# Patient Record
Sex: MALE | Race: WHITE | NOT HISPANIC OR LATINO | Employment: OTHER | URBAN - METROPOLITAN AREA
[De-identification: names, ages, dates, MRNs, and addresses within clinical notes are randomized per-mention and may not be internally consistent; named-entity substitution may affect disease eponyms.]

---

## 2017-06-08 ENCOUNTER — OFFICE VISIT (OUTPATIENT)
Dept: MEDICAL GROUP | Facility: MEDICAL CENTER | Age: 62
End: 2017-06-08
Attending: INTERNAL MEDICINE
Payer: MEDICAID

## 2017-06-08 VITALS
HEART RATE: 88 BPM | DIASTOLIC BLOOD PRESSURE: 90 MMHG | OXYGEN SATURATION: 93 % | RESPIRATION RATE: 16 BRPM | HEIGHT: 70 IN | TEMPERATURE: 98.6 F | WEIGHT: 143 LBS | SYSTOLIC BLOOD PRESSURE: 130 MMHG | BODY MASS INDEX: 20.47 KG/M2

## 2017-06-08 DIAGNOSIS — L57.0 ACTINIC KERATOSIS: ICD-10-CM

## 2017-06-08 DIAGNOSIS — Z13.1 SCREENING FOR DIABETES MELLITUS: ICD-10-CM

## 2017-06-08 DIAGNOSIS — R41.3 MEMORY LOSS: ICD-10-CM

## 2017-06-08 DIAGNOSIS — Z13.220 SCREENING CHOLESTEROL LEVEL: ICD-10-CM

## 2017-06-08 DIAGNOSIS — F10.21 ALCOHOLISM IN REMISSION (HCC): ICD-10-CM

## 2017-06-08 DIAGNOSIS — Z72.0 TOBACCO USE: ICD-10-CM

## 2017-06-08 PROCEDURE — 99204 OFFICE O/P NEW MOD 45 MIN: CPT | Performed by: INTERNAL MEDICINE

## 2017-06-08 ASSESSMENT — PATIENT HEALTH QUESTIONNAIRE - PHQ9: CLINICAL INTERPRETATION OF PHQ2 SCORE: 0

## 2017-06-08 ASSESSMENT — PAIN SCALES - GENERAL: PAINLEVEL: NO PAIN

## 2017-06-08 NOTE — PROGRESS NOTES
Bola Varela is a 61 y.o. male here for evaluation of memory loss, establish care    HPI:  No previous PCP, cannot remember the last time he saw a physician  Memory loss  Patient denies memory loss, however he was sent over for evaluation by [facility] for this reason. He had a little bit of difficulty getting down the routine expected of him at [facility] and thinks this may be why he was sent over. He is been there for about 5 weeks now and feels comfortable with things.    Alcoholism in remission (CMS-HCC)  Patient reports a history of heavy drinking when he was on the streets although he cannot quantify the amount. States that he was picked up for too many open container violations and not swine he is currently residing at [facility]. Has been there for 5 weeks, does not know how long he will be required to stay. Has maintained sobriety throughout that time.    Tobacco use  Patient reports smoking several cigarettes a day. He has been smoking for most of his distal life off and on. He denies dyspnea on exertion, states he can walk up a flight of stairs or walk a block without having to stop. He denies chronic cough.    Actinic keratosis  Numerous actinic keratoses over forearms and hands. Patient has had a lot of sun exposure.      Current medicines (including changes today)  No current outpatient prescriptions on file.     No current facility-administered medications for this visit.     He  has a past medical history of Tobacco use.  He  has past surgical history that includes ankle orif (Right).  Social History   Substance Use Topics   • Smoking status: Current Every Day Smoker -- 0.10 packs/day for 40 years     Types: Cigarettes   • Smokeless tobacco: Never Used      Comment: 1 ppd until few years ago   • Alcohol Use: No      Comment: previous heavy alcohol use     Social History     Social History Narrative     Family History   Problem Relation Age of Onset   • No Known Problems Mother    • No Known  "Problems Father    • Cancer Neg Hx    • Diabetes Neg Hx    • Heart Disease Neg Hx    • Stroke Neg Hx        ROS  As above in history of present illness  All other systems reviewed and are negative     Objective:     Blood pressure 130/90, pulse 88, temperature 37 °C (98.6 °F), resp. rate 16, height 1.778 m (5' 10\"), weight 64.864 kg (143 lb), SpO2 93 %. Body mass index is 20.52 kg/(m^2).  Physical Exam:    Constitutional: Alert, no distress.  Skin: Warm, dry, good turgor, severely sun damaged skin, numerous actinic keratoses .  Eye: Equal, round and reactive, conjunctiva clear, lids normal.  ENMT: Lips without lesions, poor dentition, oropharynx clear.  Neck: Trachea midline, no masses, no thyromegaly. No cervical or supraclavicular lymphadenopathy.  Respiratory: Unlabored respiratory effort, diffuse wheezing on over right upper and lower lung fields, left side clear to ascultation  Cardiovascular: Normal S1, S2, no murmur, no edema.  Abdomen: Soft, non-tender, no masses, no hepatosplenomegaly.  Psych: Alert and oriented x3, normal affect and mood. MMSE done in clinic today was 30/30    Assessment and Plan:   The following treatment plan was discussed    1. Memory loss  Patient has difficulty recalling specific details about past habits and timeframes, however his score on the MMSE done in clinic today was 30 out of 30. Some of his memory issues may be related to his history of heavy drinking. He states he will be moving to [place] before the end of this month for work. In this case, no further workup warranted.    2. Alcoholism in remission (CMS-HCC)  Stable, currently living in [facility]. Will continue to monitor.  - COMP METABOLIC PANEL; Future    3. Screening for diabetes mellitus  - HEMOGLOBIN A1C; Future    4. Screening cholesterol level  - LIPID PROFILE; Future    5. Tobacco use  Patient smokes one to 2 cigarettes a day. Not interested in quitting at this time. We will continue to encourage cessation.    6. " Wheezing on exam  Patient is asymptomatic from a pulmonary standpoint. However given his long smoking history, I would like to get a chest x-ray to evaluate his abnormal breath sounds. However he quickly left the clinic due to an appointment with his  and plans on leaving [place] in the next couple of weeks, so I did not order this. If he returns for follow-up, would obtain a chest x-ray.    7. Actinic keratoses  Numerous actinic keratoses over bilateral forearms. We discussed having these frozen off today however patient had to leave the clinic to make his  his appointment. If he returns for follow-up, we can treat these with cryotherapy.      Followup: Return in about 1 year (around 6/8/2018), or if symptoms worsen or fail to improve.

## 2017-06-08 NOTE — MR AVS SNAPSHOT
"        Bola Varela   2017 10:50 AM   Office Visit   MRN: [id number]    [facility]   Dept Phone:  [phone]    Description:  Male : 1955   Provider:  Bharti Ramirez M.D.           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)   [576185]       Screening for diabetes mellitus   [V77.1.ICD-9-CM]       Screening cholesterol level   [006917]         Vital Signs     Blood Pressure Pulse Temperature Respirations Height Weight    130/90 mmHg 88 37 °C (98.6 °F) 16 1.778 m (5' 10\") 64.864 kg (143 lb)    Body Mass Index Oxygen Saturation Smoking Status             20.52 kg/m2 93% Current Every Day Smoker         Basic Information     Date Of Birth Sex Race Ethnicity Preferred Language    1955 Male White Non- English      Problem List              ICD-10-CM Priority Class Noted - Resolved    Memory loss R41.3   2017 - Present    Alcoholism in remission (CMS-HCC) F10.21   2017 - Present      Health Maintenance        Date Due Completion Dates    IMM DTaP/Tdap/Td Vaccine (1 - Tdap) 1973 ---    COLONOSCOPY 2004 ---    IMM ZOSTER VACCINE 2014 ---            Current Immunizations     No immunizations on file.      Below and/or attached are the medications your provider expects you to take. Review all of your home medications and newly ordered medications with your provider and/or pharmacist. Follow medication instructions as directed by your provider and/or pharmacist. Please keep your medication list with you and share with your provider. Update the information when medications are discontinued, doses are changed, or new medications (including over-the-counter products) are added; and carry medication information at all times in the event of emergency situations     Allergies:  No Known Allergies          Medications  Valid as of: 2017 - 11:15 " AM    Generic Name Brand Name Tablet Size Instructions for use    .                 Medicines prescribed today were sent to:     [facility] - [place], [place] - [address]    [address] [place] [address]    Phone: [phone] Fax: [phone]    Open 24 Hours?: No      Medication refill instructions:       If your prescription bottle indicates you have medication refills left, it is not necessary to call your provider’s office. Please contact your pharmacy and they will refill your medication.    If your prescription bottle indicates you do not have any refills left, you may request refills at any time through one of the following ways: The online [employer] system (except Urgent Care), by calling your provider’s office, or by asking your pharmacy to contact your provider’s office with a refill request. Medication refills are processed only during regular business hours and may not be available until the next business day. Your provider may request additional information or to have a follow-up visit with you prior to refilling your medication.   *Please Note: Medication refills are assigned a new Rx number when refilled electronically. Your pharmacy may indicate that no refills were authorized even though a new prescription for the same medication is available at the pharmacy. Please request the medicine by name with the pharmacy before contacting your provider for a refill.        Your To Do List     Future Labs/Procedures Complete By Expires    COMP METABOLIC PANEL  As directed 6/9/2018    HEMOGLOBIN A1C  As directed 6/9/2018    LIPID PROFILE  As directed 6/9/2018         [employer] Status: Patient Declined        Quit Tobacco Information     Do you want to quit using tobacco?    Quitting tobacco decreases risks of cancer, heart and lung disease, increases life expectancy, improves sense of taste and smell, and increases spending money, among other benefits.    If you are thinking about quitting, we can  help.  • [facility] Quit Tobacco Program: 633-652-7083  o Program occurs weekly for four weeks and includes pharmacist consultation on products to support quitting smoking or chewing tobacco. A provider referral is needed for pharmacist consultation.  • Tobacco Users Help Hotline: 2-075-QUIT-NOW (090-7347) or https://[place].quitlogix.org/  o Free, confidential telephone and online coaching for [place] residents. Sessions are designed on a schedule that is convenient for you. Eligible clients receive free nicotine replacement therapy.  • Nationally: www.smokefree.gov  o Information and professional assistance to support both immediate and long-term needs as you become, and remain, a non-smoker. Smokefree.gov allows you to choose the help that best fits your needs.

## 2017-06-08 NOTE — ASSESSMENT & PLAN NOTE
Patient reports smoking several cigarettes a day. He has been smoking for most of his distal life off and on. He denies dyspnea on exertion, states he can walk up a flight of stairs or walk a block without having to stop. He denies chronic cough.

## 2017-06-08 NOTE — ASSESSMENT & PLAN NOTE
Patient denies memory loss, however he was sent over for evaluation by [facility] for this reason. He had a little bit of difficulty getting down the routine expected of him at [facility] and thinks this may be why he was sent over. He is been there for about 5 weeks now and feels comfortable with things.

## 2017-06-08 NOTE — ASSESSMENT & PLAN NOTE
Patient reports a history of heavy drinking when he was on the streets although he cannot quantify the amount. States that he was picked up for too many open container violations and not swine he is currently [facility]. Has been there for 5 weeks, does not know how long he will be required to stay. Has maintained sobriety throughout that time.

## 2017-06-13 ENCOUNTER — HOSPITAL ENCOUNTER (OUTPATIENT)
Dept: LAB | Facility: MEDICAL CENTER | Age: 62
End: 2017-06-13
Attending: INTERNAL MEDICINE
Payer: MEDICAID

## 2017-06-13 DIAGNOSIS — F10.21 ALCOHOLISM IN REMISSION (HCC): ICD-10-CM

## 2017-06-13 DIAGNOSIS — Z13.220 SCREENING CHOLESTEROL LEVEL: ICD-10-CM

## 2017-06-13 DIAGNOSIS — Z13.1 SCREENING FOR DIABETES MELLITUS: ICD-10-CM

## 2017-06-13 LAB
ALBUMIN SERPL BCP-MCNC: 4.4 G/DL (ref 3.2–4.9)
ALBUMIN/GLOB SERPL: 1.5 G/DL
ALP SERPL-CCNC: 55 U/L (ref 30–99)
ALT SERPL-CCNC: 31 U/L (ref 2–50)
ANION GAP SERPL CALC-SCNC: 8 MMOL/L (ref 0–11.9)
AST SERPL-CCNC: 34 U/L (ref 12–45)
BILIRUB SERPL-MCNC: 0.3 MG/DL (ref 0.1–1.5)
BUN SERPL-MCNC: 16 MG/DL (ref 8–22)
CALCIUM SERPL-MCNC: 9.5 MG/DL (ref 8.5–10.5)
CHLORIDE SERPL-SCNC: 108 MMOL/L (ref 96–112)
CHOLEST SERPL-MCNC: 173 MG/DL (ref 100–199)
CO2 SERPL-SCNC: 29 MMOL/L (ref 20–33)
CREAT SERPL-MCNC: 0.71 MG/DL (ref 0.5–1.4)
EST. AVERAGE GLUCOSE BLD GHB EST-MCNC: 117 MG/DL
GFR SERPL CREATININE-BSD FRML MDRD: >60 ML/MIN/1.73 M 2
GLOBULIN SER CALC-MCNC: 2.9 G/DL (ref 1.9–3.5)
GLUCOSE SERPL-MCNC: 95 MG/DL (ref 65–99)
HBA1C MFR BLD: 5.7 % (ref 0–5.6)
HDLC SERPL-MCNC: 58 MG/DL
LDLC SERPL CALC-MCNC: 102 MG/DL
POTASSIUM SERPL-SCNC: 3.8 MMOL/L (ref 3.6–5.5)
PROT SERPL-MCNC: 7.3 G/DL (ref 6–8.2)
SODIUM SERPL-SCNC: 145 MMOL/L (ref 135–145)
TRIGL SERPL-MCNC: 65 MG/DL (ref 0–149)

## 2017-06-13 PROCEDURE — 83036 HEMOGLOBIN GLYCOSYLATED A1C: CPT

## 2017-06-13 PROCEDURE — 80053 COMPREHEN METABOLIC PANEL: CPT

## 2017-06-13 PROCEDURE — 80061 LIPID PANEL: CPT

## 2017-06-13 PROCEDURE — 36415 COLL VENOUS BLD VENIPUNCTURE: CPT

## 2017-06-14 ENCOUNTER — TELEPHONE (OUTPATIENT)
Dept: MEDICAL GROUP | Facility: MEDICAL CENTER | Age: 62
End: 2017-06-14

## 2017-06-14 PROBLEM — R73.03 PRE-DIABETES: Status: ACTIVE | Noted: 2017-06-14

## 2017-06-14 NOTE — TELEPHONE ENCOUNTER
Phone Number Called: [phone] (home) [phone] (work)    Message: Left message on answering service for Pt to return call     Left Message for patient to call back: yes

## 2017-06-14 NOTE — TELEPHONE ENCOUNTER
----- Message from Bharti Ramirez M.D. sent at 6/14/2017  8:31 AM PDT -----  Please inform patient we have received his lab results which show normal kidney and liver function.  His screening test for diabetes is slightly elevated, which puts him in the pre diabetes category.  He does not have diabetes currently, but is at increased risk for developing it in the future.  His bad cholesterol is very slightly elevated.  There are no new medications we need to start at this point.    Bharti Ramirez M.D.

## 2017-06-15 ENCOUNTER — TELEPHONE (OUTPATIENT)
Dept: MEDICAL GROUP | Facility: MEDICAL CENTER | Age: 62
End: 2017-06-15

## 2017-06-15 NOTE — TELEPHONE ENCOUNTER
Phone Number Called: [phone] (home) [phone] (work)    Message: Spoke with patients  and advised her of the message.  stated that [facility] does not have a working phone and that they are working with him.    Left Message for patient to call back: yes

## 2017-09-22 ENCOUNTER — HOSPITAL ENCOUNTER (EMERGENCY)
Dept: HOSPITAL 8 - ED | Age: 62
Discharge: HOME | End: 2017-09-22
Payer: COMMERCIAL

## 2017-09-22 VITALS — WEIGHT: 149.91 LBS | HEIGHT: 71 IN | BODY MASS INDEX: 20.99 KG/M2

## 2017-09-22 VITALS — SYSTOLIC BLOOD PRESSURE: 148 MMHG | DIASTOLIC BLOOD PRESSURE: 100 MMHG

## 2017-09-22 DIAGNOSIS — S51.011A: Primary | ICD-10-CM
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
## 2017-09-22 PROCEDURE — 90715 TDAP VACCINE 7 YRS/> IM: CPT

## 2017-09-22 PROCEDURE — 99284 EMERGENCY DEPT VISIT MOD MDM: CPT

## 2017-09-22 PROCEDURE — 73080 X-RAY EXAM OF ELBOW: CPT

## 2017-09-22 PROCEDURE — 12002 RPR S/N/AX/GEN/TRNK2.6-7.5CM: CPT

## 2017-09-22 PROCEDURE — 90471 IMMUNIZATION ADMIN: CPT

## 2017-10-22 ENCOUNTER — HOSPITAL ENCOUNTER (EMERGENCY)
Dept: HOSPITAL 8 - ED | Age: 62
LOS: 1 days | Discharge: HOME | End: 2017-10-23
Payer: MEDICAID

## 2017-10-22 DIAGNOSIS — Z59.0: ICD-10-CM
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
## 2017-10-22 DIAGNOSIS — S50.811A: ICD-10-CM

## 2017-10-22 DIAGNOSIS — F10.129: Primary | ICD-10-CM
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
## 2017-10-22 DIAGNOSIS — F19.10: ICD-10-CM
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
## 2017-10-22 PROCEDURE — 99283 EMERGENCY DEPT VISIT LOW MDM: CPT

## 2017-10-22 SDOH — ECONOMIC STABILITY - HOUSING INSECURITY: HOMELESSNESS: Z59.0

## 2017-10-23 VITALS — SYSTOLIC BLOOD PRESSURE: 122 MMHG | DIASTOLIC BLOOD PRESSURE: 66 MMHG

## 2018-05-01 ENCOUNTER — HOSPITAL ENCOUNTER (INPATIENT)
Dept: HOSPITAL 8 - ED | Age: 63
LOS: 8 days | Discharge: NURSING, REHAB OR LONG-TERM CARE | DRG: 853 | End: 2018-05-09
Attending: HOSPITALIST | Admitting: HOSPITALIST
Payer: MEDICAID

## 2018-05-01 VITALS — WEIGHT: 154.76 LBS | HEIGHT: 71 IN | BODY MASS INDEX: 21.67 KG/M2

## 2018-05-01 VITALS — DIASTOLIC BLOOD PRESSURE: 76 MMHG | SYSTOLIC BLOOD PRESSURE: 116 MMHG

## 2018-05-01 VITALS — SYSTOLIC BLOOD PRESSURE: 114 MMHG | DIASTOLIC BLOOD PRESSURE: 68 MMHG

## 2018-05-01 DIAGNOSIS — E46: ICD-10-CM

## 2018-05-01 DIAGNOSIS — E87.1: ICD-10-CM

## 2018-05-01 DIAGNOSIS — D50.0: ICD-10-CM

## 2018-05-01 DIAGNOSIS — N17.9: ICD-10-CM

## 2018-05-01 DIAGNOSIS — A41.9: Primary | ICD-10-CM

## 2018-05-01 DIAGNOSIS — E87.6: ICD-10-CM

## 2018-05-01 DIAGNOSIS — J93.82: ICD-10-CM

## 2018-05-01 DIAGNOSIS — B85.1: ICD-10-CM

## 2018-05-01 DIAGNOSIS — L03.116: ICD-10-CM

## 2018-05-01 DIAGNOSIS — K76.0: ICD-10-CM

## 2018-05-01 DIAGNOSIS — B95.5: ICD-10-CM

## 2018-05-01 DIAGNOSIS — F41.9: ICD-10-CM

## 2018-05-01 DIAGNOSIS — F05: ICD-10-CM

## 2018-05-01 DIAGNOSIS — K05.6: ICD-10-CM

## 2018-05-01 DIAGNOSIS — D53.9: ICD-10-CM

## 2018-05-01 DIAGNOSIS — L29.9: ICD-10-CM

## 2018-05-01 DIAGNOSIS — R73.9: ICD-10-CM

## 2018-05-01 DIAGNOSIS — F10.239: ICD-10-CM

## 2018-05-01 DIAGNOSIS — B86: ICD-10-CM

## 2018-05-01 DIAGNOSIS — M60.9: ICD-10-CM

## 2018-05-01 DIAGNOSIS — B95.8: ICD-10-CM

## 2018-05-01 DIAGNOSIS — Z59.0: ICD-10-CM

## 2018-05-01 DIAGNOSIS — M72.6: ICD-10-CM

## 2018-05-01 DIAGNOSIS — G93.41: ICD-10-CM

## 2018-05-01 DIAGNOSIS — F17.210: ICD-10-CM

## 2018-05-01 LAB
<PLATELET ESTIMATE>: ADEQUATE
<PLT MORPHOLOGY>: (no result)
ALBUMIN SERPL-MCNC: 2.7 G/DL (ref 3.4–5)
ALP SERPL-CCNC: 111 U/L (ref 45–117)
ALT SERPL-CCNC: 113 U/L (ref 12–78)
ANION GAP SERPL CALC-SCNC: 13 MMOL/L (ref 5–15)
BAND#(MANUAL): 4.9 X10^3/UL
BILIRUB DIRECT SERPL-MCNC: NORMAL MG/DL
BILIRUB SERPL-MCNC: 1.1 MG/DL (ref 0.2–1)
CALCIUM SERPL-MCNC: 8.1 MG/DL (ref 8.5–10.1)
CHLORIDE SERPL-SCNC: 97 MMOL/L (ref 98–107)
CREAT SERPL-MCNC: 1.44 MG/DL (ref 0.7–1.3)
ERYTHROCYTE [DISTWIDTH] IN BLOOD BY AUTOMATED COUNT: 15 % (ref 9.4–14.8)
ERYTHROCYTE [SEDIMENTATION RATE] IN BLOOD BY WESTERGREN METHOD: 76 MM/HR (ref 0–10)
HCT (SEDRATE): 37.5 % (ref 39.2–51.8)
INR PPP: 1.11 (ref 0.93–1.1)
LYMPH#(MANUAL): 0.25 X10^3/UL (ref 1–3.4)
LYMPHS% (MANUAL): 1 % (ref 22–44)
MCH RBC QN AUTO: 32.4 PG (ref 27.5–34.5)
MCHC RBC AUTO-ENTMCNC: 33.3 G/DL (ref 33.2–36.2)
MCV RBC AUTO: 97.6 FL (ref 81–97)
MD: YES
MONOS#(MANUAL): 1.47 X10^3/UL (ref 0.3–2.7)
MONOS% (MANUAL): 6 % (ref 2–9)
NEUTS BAND NFR BLD: 20 % (ref 0–7)
PLATELET # BLD AUTO: 224 X10^3/UL (ref 130–400)
PMNS WITH VACUOLES: (no result)
PMV BLD AUTO: 7.8 FL (ref 7.4–10.4)
PROT SERPL-MCNC: 6.8 G/DL (ref 6.4–8.2)
PROTHROMBIN TIME: 11.5 SECONDS (ref 9.6–11.5)
RBC # BLD AUTO: 3.63 X10^6/UL (ref 4.38–5.82)
SEG#(MANUAL): 17.89 X10^3/UL (ref 1.8–6.8)
SEGS% (MANUAL): 73 % (ref 42–75)

## 2018-05-01 PROCEDURE — 77001 FLUOROGUIDE FOR VEIN DEVICE: CPT

## 2018-05-01 PROCEDURE — 82746 ASSAY OF FOLIC ACID SERUM: CPT

## 2018-05-01 PROCEDURE — 86704 HEP B CORE ANTIBODY TOTAL: CPT

## 2018-05-01 PROCEDURE — 82607 VITAMIN B-12: CPT

## 2018-05-01 PROCEDURE — 87040 BLOOD CULTURE FOR BACTERIA: CPT

## 2018-05-01 PROCEDURE — 76937 US GUIDE VASCULAR ACCESS: CPT

## 2018-05-01 PROCEDURE — 87147 CULTURE TYPE IMMUNOLOGIC: CPT

## 2018-05-01 PROCEDURE — 87077 CULTURE AEROBIC IDENTIFY: CPT

## 2018-05-01 PROCEDURE — 87075 CULTR BACTERIA EXCEPT BLOOD: CPT

## 2018-05-01 PROCEDURE — 85025 COMPLETE CBC W/AUTO DIFF WBC: CPT

## 2018-05-01 PROCEDURE — 93005 ELECTROCARDIOGRAM TRACING: CPT

## 2018-05-01 PROCEDURE — 85651 RBC SED RATE NONAUTOMATED: CPT

## 2018-05-01 PROCEDURE — 86803 HEPATITIS C AB TEST: CPT

## 2018-05-01 PROCEDURE — 87340 HEPATITIS B SURFACE AG IA: CPT

## 2018-05-01 PROCEDURE — 86706 HEP B SURFACE ANTIBODY: CPT

## 2018-05-01 PROCEDURE — 87205 SMEAR GRAM STAIN: CPT

## 2018-05-01 PROCEDURE — G0475 HIV COMBINATION ASSAY: HCPCS

## 2018-05-01 PROCEDURE — 87176 TISSUE HOMOGENIZATION CULTR: CPT

## 2018-05-01 PROCEDURE — 99285 EMERGENCY DEPT VISIT HI MDM: CPT

## 2018-05-01 PROCEDURE — 84100 ASSAY OF PHOSPHORUS: CPT

## 2018-05-01 PROCEDURE — 71045 X-RAY EXAM CHEST 1 VIEW: CPT

## 2018-05-01 PROCEDURE — 76700 US EXAM ABDOM COMPLETE: CPT

## 2018-05-01 PROCEDURE — C1751 CATH, INF, PER/CENT/MIDLINE: HCPCS

## 2018-05-01 PROCEDURE — 86480 TB TEST CELL IMMUN MEASURE: CPT

## 2018-05-01 PROCEDURE — 82550 ASSAY OF CK (CPK): CPT

## 2018-05-01 PROCEDURE — 87070 CULTURE OTHR SPECIMN AEROBIC: CPT

## 2018-05-01 PROCEDURE — 87806 HIV AG W/HIV1&2 ANTB W/OPTIC: CPT

## 2018-05-01 PROCEDURE — 0JBP0ZZ EXCISION OF LEFT LOWER LEG SUBCUTANEOUS TISSUE AND FASCIA, OPEN APPROACH: ICD-10-PCS | Performed by: SURGERY

## 2018-05-01 PROCEDURE — 87181 SC STD AGAR DILUTION PER AGT: CPT

## 2018-05-01 PROCEDURE — 84145 PROCALCITONIN (PCT): CPT

## 2018-05-01 PROCEDURE — 36415 COLL VENOUS BLD VENIPUNCTURE: CPT

## 2018-05-01 PROCEDURE — 83735 ASSAY OF MAGNESIUM: CPT

## 2018-05-01 PROCEDURE — 80053 COMPREHEN METABOLIC PANEL: CPT

## 2018-05-01 PROCEDURE — 86140 C-REACTIVE PROTEIN: CPT

## 2018-05-01 PROCEDURE — 85610 PROTHROMBIN TIME: CPT

## 2018-05-01 PROCEDURE — 87186 SC STD MICRODIL/AGAR DIL: CPT

## 2018-05-01 PROCEDURE — 84443 ASSAY THYROID STIM HORMONE: CPT

## 2018-05-01 PROCEDURE — 83605 ASSAY OF LACTIC ACID: CPT

## 2018-05-01 PROCEDURE — 36569 INSJ PICC 5 YR+ W/O IMAGING: CPT

## 2018-05-01 RX ADMIN — DAPTOMYCIN SCH MLS/HR: 500 INJECTION, POWDER, LYOPHILIZED, FOR SOLUTION INTRAVENOUS at 22:57

## 2018-05-01 RX ADMIN — CLINDAMYCIN IN 5 PERCENT DEXTROSE SCH MLS/HR: 12 INJECTION, SOLUTION INTRAVENOUS at 15:16

## 2018-05-01 RX ADMIN — TAZOBACTAM SODIUM AND PIPERACILLIN SODIUM SCH MLS/HR: 500; 4 INJECTION, SOLUTION INTRAVENOUS at 19:36

## 2018-05-01 RX ADMIN — KETOROLAC TROMETHAMINE SCH MG: 30 INJECTION, SOLUTION INTRAMUSCULAR at 22:09

## 2018-05-01 RX ADMIN — HEPARIN SODIUM SCH UNITS: 5000 INJECTION, SOLUTION INTRAVENOUS; SUBCUTANEOUS at 22:57

## 2018-05-01 RX ADMIN — CLINDAMYCIN IN 5 PERCENT DEXTROSE SCH MLS/HR: 12 INJECTION, SOLUTION INTRAVENOUS at 21:30

## 2018-05-01 RX ADMIN — HEPARIN SODIUM SCH UNITS: 5000 INJECTION, SOLUTION INTRAVENOUS; SUBCUTANEOUS at 14:37

## 2018-05-01 SDOH — ECONOMIC STABILITY - HOUSING INSECURITY: HOMELESSNESS: Z59.0

## 2018-05-02 VITALS — SYSTOLIC BLOOD PRESSURE: 115 MMHG | DIASTOLIC BLOOD PRESSURE: 64 MMHG

## 2018-05-02 VITALS — DIASTOLIC BLOOD PRESSURE: 59 MMHG | SYSTOLIC BLOOD PRESSURE: 117 MMHG

## 2018-05-02 VITALS — SYSTOLIC BLOOD PRESSURE: 114 MMHG | DIASTOLIC BLOOD PRESSURE: 73 MMHG

## 2018-05-02 VITALS — DIASTOLIC BLOOD PRESSURE: 66 MMHG | SYSTOLIC BLOOD PRESSURE: 100 MMHG

## 2018-05-02 LAB
<PLATELET ESTIMATE>: ADEQUATE
<PLT MORPHOLOGY>: (no result)
ALBUMIN SERPL-MCNC: 1.9 G/DL (ref 3.4–5)
ALP SERPL-CCNC: 82 U/L (ref 45–117)
ALT SERPL-CCNC: 113 U/L (ref 12–78)
ANION GAP SERPL CALC-SCNC: 11 MMOL/L (ref 5–15)
BAND#(MANUAL): 2.24 X10^3/UL
BILIRUB DIRECT SERPL-MCNC: NORMAL MG/DL
BILIRUB SERPL-MCNC: 0.6 MG/DL (ref 0.2–1)
CALCIUM SERPL-MCNC: 6.7 MG/DL (ref 8.5–10.1)
CHLORIDE SERPL-SCNC: 104 MMOL/L (ref 98–107)
CREAT SERPL-MCNC: 1.49 MG/DL (ref 0.7–1.3)
DOHLE BOD BLD QL SMEAR: (no result)
ERYTHROCYTE [DISTWIDTH] IN BLOOD BY AUTOMATED COUNT: 15.2 % (ref 9.4–14.8)
FOLATE SERPL-MCNC: 14.2 NG/ML (ref 3.1–17.5)
LYMPH#(MANUAL): 0.52 X10^3/UL (ref 1–3.4)
LYMPHS% (MANUAL): 3 % (ref 22–44)
MCH RBC QN AUTO: 33.5 PG (ref 27.5–34.5)
MCHC RBC AUTO-ENTMCNC: 33.3 G/DL (ref 33.2–36.2)
MCV RBC AUTO: 100.5 FL (ref 81–97)
MD: YES
MONOS#(MANUAL): 0.69 X10^3/UL (ref 0.3–2.7)
MONOS% (MANUAL): 4 % (ref 2–9)
NEUTS BAND NFR BLD: 13 % (ref 0–7)
PLATELET # BLD AUTO: 184 X10^3/UL (ref 130–400)
PMNS WITH VACUOLES: (no result)
PMV BLD AUTO: 8.2 FL (ref 7.4–10.4)
PROT SERPL-MCNC: 5.4 G/DL (ref 6.4–8.2)
RBC # BLD AUTO: 2.87 X10^6/UL (ref 4.38–5.82)
SEG#(MANUAL): 13.76 X10^3/UL (ref 1.8–6.8)
SEGS% (MANUAL): 80 % (ref 42–75)
TSH SERPL-ACNC: 0.59 MIU/L (ref 0.36–3.74)

## 2018-05-02 RX ADMIN — HEPARIN SODIUM SCH UNITS: 5000 INJECTION, SOLUTION INTRAVENOUS; SUBCUTANEOUS at 08:35

## 2018-05-02 RX ADMIN — CLINDAMYCIN IN 5 PERCENT DEXTROSE SCH MLS/HR: 12 INJECTION, SOLUTION INTRAVENOUS at 21:04

## 2018-05-02 RX ADMIN — KETOROLAC TROMETHAMINE SCH MG: 30 INJECTION, SOLUTION INTRAMUSCULAR at 09:40

## 2018-05-02 RX ADMIN — TAZOBACTAM SODIUM AND PIPERACILLIN SODIUM SCH MLS/HR: 500; 4 INJECTION, SOLUTION INTRAVENOUS at 02:06

## 2018-05-02 RX ADMIN — CLINDAMYCIN IN 5 PERCENT DEXTROSE SCH MLS/HR: 12 INJECTION, SOLUTION INTRAVENOUS at 09:40

## 2018-05-02 RX ADMIN — OXYCODONE HYDROCHLORIDE PRN MG: 5 TABLET ORAL at 12:26

## 2018-05-02 RX ADMIN — TAZOBACTAM SODIUM AND PIPERACILLIN SODIUM SCH MLS/HR: 500; 4 INJECTION, SOLUTION INTRAVENOUS at 14:23

## 2018-05-02 RX ADMIN — SODIUM CHLORIDE SCH MLS/HR: 0.9 INJECTION, SOLUTION INTRAVENOUS at 21:04

## 2018-05-02 RX ADMIN — KETOROLAC TROMETHAMINE SCH MG: 30 INJECTION, SOLUTION INTRAMUSCULAR at 21:53

## 2018-05-02 RX ADMIN — CLINDAMYCIN IN 5 PERCENT DEXTROSE SCH MLS/HR: 12 INJECTION, SOLUTION INTRAVENOUS at 03:29

## 2018-05-02 RX ADMIN — HEPARIN SODIUM SCH UNITS: 5000 INJECTION, SOLUTION INTRAVENOUS; SUBCUTANEOUS at 15:53

## 2018-05-02 RX ADMIN — DAPTOMYCIN SCH MLS/HR: 500 INJECTION, POWDER, LYOPHILIZED, FOR SOLUTION INTRAVENOUS at 23:03

## 2018-05-02 RX ADMIN — KETOROLAC TROMETHAMINE SCH MG: 30 INJECTION, SOLUTION INTRAMUSCULAR at 03:29

## 2018-05-02 RX ADMIN — KETOROLAC TROMETHAMINE SCH MG: 30 INJECTION, SOLUTION INTRAMUSCULAR at 15:53

## 2018-05-02 RX ADMIN — TAZOBACTAM SODIUM AND PIPERACILLIN SODIUM SCH MLS/HR: 500; 4 INJECTION, SOLUTION INTRAVENOUS at 08:35

## 2018-05-02 RX ADMIN — SODIUM CHLORIDE SCH MLS/HR: 0.9 INJECTION, SOLUTION INTRAVENOUS at 11:01

## 2018-05-02 RX ADMIN — TAZOBACTAM SODIUM AND PIPERACILLIN SODIUM SCH MLS/HR: 500; 4 INJECTION, SOLUTION INTRAVENOUS at 19:34

## 2018-05-02 RX ADMIN — CLINDAMYCIN IN 5 PERCENT DEXTROSE SCH MLS/HR: 12 INJECTION, SOLUTION INTRAVENOUS at 15:53

## 2018-05-03 VITALS — SYSTOLIC BLOOD PRESSURE: 128 MMHG | DIASTOLIC BLOOD PRESSURE: 79 MMHG

## 2018-05-03 VITALS — DIASTOLIC BLOOD PRESSURE: 64 MMHG | SYSTOLIC BLOOD PRESSURE: 128 MMHG

## 2018-05-03 VITALS — SYSTOLIC BLOOD PRESSURE: 131 MMHG | DIASTOLIC BLOOD PRESSURE: 64 MMHG

## 2018-05-03 VITALS — DIASTOLIC BLOOD PRESSURE: 73 MMHG | SYSTOLIC BLOOD PRESSURE: 119 MMHG

## 2018-05-03 LAB
<PLATELET ESTIMATE>: ADEQUATE
<PLT MORPHOLOGY>: (no result)
ALBUMIN SERPL-MCNC: 1.8 G/DL (ref 3.4–5)
ALP SERPL-CCNC: 135 U/L (ref 45–117)
ALT SERPL-CCNC: 122 U/L (ref 12–78)
ANION GAP SERPL CALC-SCNC: 11 MMOL/L (ref 5–15)
BAND#(MANUAL): 0.27 X10^3/UL
BILIRUB DIRECT SERPL-MCNC: NORMAL MG/DL
BILIRUB SERPL-MCNC: 0.5 MG/DL (ref 0.2–1)
CALCIUM SERPL-MCNC: 7.6 MG/DL (ref 8.5–10.1)
CHLORIDE SERPL-SCNC: 108 MMOL/L (ref 98–107)
CREAT SERPL-MCNC: 1.52 MG/DL (ref 0.7–1.3)
EOS#(MANUAL): 0.54 X10^3/UL (ref 0–0.4)
EOS% (MANUAL): 4 % (ref 1–7)
ERYTHROCYTE [DISTWIDTH] IN BLOOD BY AUTOMATED COUNT: 14.5 % (ref 9.4–14.8)
LYMPH#(MANUAL): 0.68 X10^3/UL (ref 1–3.4)
LYMPHS% (MANUAL): 5 % (ref 22–44)
MCH RBC QN AUTO: 32.9 PG (ref 27.5–34.5)
MCHC RBC AUTO-ENTMCNC: 33.5 G/DL (ref 33.2–36.2)
MCV RBC AUTO: 98.2 FL (ref 81–97)
MD: YES
MONOS#(MANUAL): 0.82 X10^3/UL (ref 0.3–2.7)
MONOS% (MANUAL): 6 % (ref 2–9)
NEUTS BAND NFR BLD: 2 % (ref 0–7)
PLATELET # BLD AUTO: 204 X10^3/UL (ref 130–400)
PMV BLD AUTO: 8.3 FL (ref 7.4–10.4)
PROT SERPL-MCNC: 5.3 G/DL (ref 6.4–8.2)
RBC # BLD AUTO: 2.66 X10^6/UL (ref 4.38–5.82)
SEG#(MANUAL): 11.29 X10^3/UL (ref 1.8–6.8)
SEGS% (MANUAL): 83 % (ref 42–75)

## 2018-05-03 PROCEDURE — 0JBM0ZZ EXCISION OF LEFT UPPER LEG SUBCUTANEOUS TISSUE AND FASCIA, OPEN APPROACH: ICD-10-PCS | Performed by: SURGERY

## 2018-05-03 RX ADMIN — HEPARIN SODIUM SCH UNITS: 5000 INJECTION, SOLUTION INTRAVENOUS; SUBCUTANEOUS at 14:30

## 2018-05-03 RX ADMIN — HEPARIN SODIUM SCH UNITS: 5000 INJECTION, SOLUTION INTRAVENOUS; SUBCUTANEOUS at 08:00

## 2018-05-03 RX ADMIN — CLINDAMYCIN IN 5 PERCENT DEXTROSE SCH MLS/HR: 12 INJECTION, SOLUTION INTRAVENOUS at 03:47

## 2018-05-03 RX ADMIN — CLINDAMYCIN IN 5 PERCENT DEXTROSE SCH MLS/HR: 12 INJECTION, SOLUTION INTRAVENOUS at 15:06

## 2018-05-03 RX ADMIN — SODIUM CHLORIDE SCH MLS/HR: 0.9 INJECTION, SOLUTION INTRAVENOUS at 09:45

## 2018-05-03 RX ADMIN — CLINDAMYCIN IN 5 PERCENT DEXTROSE SCH MLS/HR: 12 INJECTION, SOLUTION INTRAVENOUS at 10:23

## 2018-05-03 RX ADMIN — TAZOBACTAM SODIUM AND PIPERACILLIN SODIUM SCH MLS/HR: 500; 4 INJECTION, SOLUTION INTRAVENOUS at 02:08

## 2018-05-03 RX ADMIN — TAZOBACTAM SODIUM AND PIPERACILLIN SODIUM SCH MLS/HR: 500; 4 INJECTION, SOLUTION INTRAVENOUS at 14:29

## 2018-05-03 RX ADMIN — CLINDAMYCIN IN 5 PERCENT DEXTROSE SCH MLS/HR: 12 INJECTION, SOLUTION INTRAVENOUS at 21:57

## 2018-05-03 RX ADMIN — DAPTOMYCIN SCH MLS/HR: 500 INJECTION, POWDER, LYOPHILIZED, FOR SOLUTION INTRAVENOUS at 23:33

## 2018-05-03 RX ADMIN — HEPARIN SODIUM SCH UNITS: 5000 INJECTION, SOLUTION INTRAVENOUS; SUBCUTANEOUS at 23:33

## 2018-05-03 RX ADMIN — KETOROLAC TROMETHAMINE SCH MG: 30 INJECTION, SOLUTION INTRAMUSCULAR at 03:47

## 2018-05-03 RX ADMIN — HEPARIN SODIUM SCH UNITS: 5000 INJECTION, SOLUTION INTRAVENOUS; SUBCUTANEOUS at 00:12

## 2018-05-03 RX ADMIN — SODIUM CHLORIDE SCH MLS/HR: 0.9 INJECTION, SOLUTION INTRAVENOUS at 14:30

## 2018-05-03 RX ADMIN — TAZOBACTAM SODIUM AND PIPERACILLIN SODIUM SCH MLS/HR: 500; 4 INJECTION, SOLUTION INTRAVENOUS at 09:44

## 2018-05-03 RX ADMIN — POTASSIUM CHLORIDE SCH MEQ: 20 TABLET, EXTENDED RELEASE ORAL at 12:14

## 2018-05-04 VITALS — SYSTOLIC BLOOD PRESSURE: 136 MMHG | DIASTOLIC BLOOD PRESSURE: 80 MMHG

## 2018-05-04 VITALS — DIASTOLIC BLOOD PRESSURE: 80 MMHG | SYSTOLIC BLOOD PRESSURE: 153 MMHG

## 2018-05-04 VITALS — DIASTOLIC BLOOD PRESSURE: 69 MMHG | SYSTOLIC BLOOD PRESSURE: 125 MMHG

## 2018-05-04 VITALS — DIASTOLIC BLOOD PRESSURE: 81 MMHG | SYSTOLIC BLOOD PRESSURE: 145 MMHG

## 2018-05-04 LAB
ALBUMIN SERPL-MCNC: 1.9 G/DL (ref 3.4–5)
ALP SERPL-CCNC: 137 U/L (ref 45–117)
ALT SERPL-CCNC: 122 U/L (ref 12–78)
ANION GAP SERPL CALC-SCNC: 9 MMOL/L (ref 5–15)
BASOPHILS # BLD AUTO: 0.04 X10^3/UL (ref 0–0.1)
BASOPHILS NFR BLD AUTO: 0 % (ref 0–1)
BILIRUB SERPL-MCNC: 0.5 MG/DL (ref 0.2–1)
CALCIUM SERPL-MCNC: 7.4 MG/DL (ref 8.5–10.1)
CHLORIDE SERPL-SCNC: 109 MMOL/L (ref 98–107)
CREAT SERPL-MCNC: 1.01 MG/DL (ref 0.7–1.3)
EOSINOPHIL # BLD AUTO: 0.3 X10^3/UL (ref 0–0.4)
EOSINOPHIL NFR BLD AUTO: 3 % (ref 1–7)
ERYTHROCYTE [DISTWIDTH] IN BLOOD BY AUTOMATED COUNT: 15.3 % (ref 9.4–14.8)
LYMPHOCYTES # BLD AUTO: 0.92 X10^3/UL (ref 1–3.4)
LYMPHOCYTES NFR BLD AUTO: 9 % (ref 22–44)
MCH RBC QN AUTO: 32.9 PG (ref 27.5–34.5)
MCHC RBC AUTO-ENTMCNC: 33.1 G/DL (ref 33.2–36.2)
MCV RBC AUTO: 99.5 FL (ref 81–97)
MD: NO
MONOCYTES # BLD AUTO: 0.75 X10^3/UL (ref 0.2–0.8)
MONOCYTES NFR BLD AUTO: 7 % (ref 2–9)
NEUTROPHILS # BLD AUTO: 8.41 X10^3/UL (ref 1.8–6.8)
NEUTROPHILS NFR BLD AUTO: 81 % (ref 42–75)
PLATELET # BLD AUTO: 268 X10^3/UL (ref 130–400)
PMV BLD AUTO: 8.3 FL (ref 7.4–10.4)
PROT SERPL-MCNC: 5.8 G/DL (ref 6.4–8.2)
RBC # BLD AUTO: 2.99 X10^6/UL (ref 4.38–5.82)

## 2018-05-04 RX ADMIN — SODIUM CHLORIDE SCH MLS/HR: 0.9 INJECTION, SOLUTION INTRAVENOUS at 07:58

## 2018-05-04 RX ADMIN — OXYCODONE HYDROCHLORIDE PRN MG: 5 TABLET ORAL at 16:38

## 2018-05-04 RX ADMIN — POTASSIUM CHLORIDE SCH MEQ: 20 TABLET, EXTENDED RELEASE ORAL at 16:35

## 2018-05-04 RX ADMIN — POTASSIUM CHLORIDE SCH MEQ: 20 TABLET, EXTENDED RELEASE ORAL at 07:56

## 2018-05-04 RX ADMIN — OXYCODONE HYDROCHLORIDE PRN MG: 5 TABLET ORAL at 05:40

## 2018-05-04 RX ADMIN — OXYCODONE HYDROCHLORIDE PRN MG: 5 TABLET ORAL at 11:06

## 2018-05-04 RX ADMIN — CLINDAMYCIN IN 5 PERCENT DEXTROSE SCH MLS/HR: 12 INJECTION, SOLUTION INTRAVENOUS at 03:41

## 2018-05-04 RX ADMIN — OXYCODONE HYDROCHLORIDE PRN MG: 5 TABLET ORAL at 22:41

## 2018-05-04 RX ADMIN — CLINDAMYCIN IN 5 PERCENT DEXTROSE SCH MLS/HR: 12 INJECTION, SOLUTION INTRAVENOUS at 10:36

## 2018-05-04 RX ADMIN — HEPARIN SODIUM SCH UNITS: 5000 INJECTION, SOLUTION INTRAVENOUS; SUBCUTANEOUS at 08:35

## 2018-05-04 RX ADMIN — HEPARIN SODIUM SCH UNITS: 5000 INJECTION, SOLUTION INTRAVENOUS; SUBCUTANEOUS at 16:35

## 2018-05-04 RX ADMIN — DAPTOMYCIN SCH MLS/HR: 500 INJECTION, POWDER, LYOPHILIZED, FOR SOLUTION INTRAVENOUS at 23:48

## 2018-05-04 RX ADMIN — SODIUM CHLORIDE SCH MLS/HR: 0.9 INJECTION, SOLUTION INTRAVENOUS at 18:35

## 2018-05-05 VITALS — DIASTOLIC BLOOD PRESSURE: 71 MMHG | SYSTOLIC BLOOD PRESSURE: 151 MMHG

## 2018-05-05 VITALS — DIASTOLIC BLOOD PRESSURE: 81 MMHG | SYSTOLIC BLOOD PRESSURE: 145 MMHG

## 2018-05-05 VITALS — SYSTOLIC BLOOD PRESSURE: 146 MMHG | DIASTOLIC BLOOD PRESSURE: 84 MMHG

## 2018-05-05 VITALS — SYSTOLIC BLOOD PRESSURE: 143 MMHG | DIASTOLIC BLOOD PRESSURE: 95 MMHG

## 2018-05-05 LAB
ALBUMIN SERPL-MCNC: 1.7 G/DL (ref 3.4–5)
ALP SERPL-CCNC: 107 U/L (ref 45–117)
ALT SERPL-CCNC: 70 U/L (ref 12–78)
ANION GAP SERPL CALC-SCNC: 10 MMOL/L (ref 5–15)
BASOPHILS # BLD AUTO: 0.03 X10^3/UL (ref 0–0.1)
BASOPHILS NFR BLD AUTO: 1 % (ref 0–1)
BILIRUB SERPL-MCNC: 0.4 MG/DL (ref 0.2–1)
CALCIUM SERPL-MCNC: 7.3 MG/DL (ref 8.5–10.1)
CHLORIDE SERPL-SCNC: 111 MMOL/L (ref 98–107)
CREAT SERPL-MCNC: 0.65 MG/DL (ref 0.7–1.3)
EOSINOPHIL # BLD AUTO: 0.08 X10^3/UL (ref 0–0.4)
EOSINOPHIL NFR BLD AUTO: 1 % (ref 1–7)
ERYTHROCYTE [DISTWIDTH] IN BLOOD BY AUTOMATED COUNT: 15.3 % (ref 9.4–14.8)
LYMPHOCYTES # BLD AUTO: 0.99 X10^3/UL (ref 1–3.4)
LYMPHOCYTES NFR BLD AUTO: 15 % (ref 22–44)
MCH RBC QN AUTO: 32.8 PG (ref 27.5–34.5)
MCHC RBC AUTO-ENTMCNC: 33 G/DL (ref 33.2–36.2)
MCV RBC AUTO: 99.3 FL (ref 81–97)
MD: NO
MONOCYTES # BLD AUTO: 0.67 X10^3/UL (ref 0.2–0.8)
MONOCYTES NFR BLD AUTO: 10 % (ref 2–9)
NEUTROPHILS # BLD AUTO: 4.79 X10^3/UL (ref 1.8–6.8)
NEUTROPHILS NFR BLD AUTO: 73 % (ref 42–75)
PLATELET # BLD AUTO: 258 X10^3/UL (ref 130–400)
PMV BLD AUTO: 8.6 FL (ref 7.4–10.4)
PROT SERPL-MCNC: 5.2 G/DL (ref 6.4–8.2)
RBC # BLD AUTO: 2.8 X10^6/UL (ref 4.38–5.82)

## 2018-05-05 PROCEDURE — 2W0MX6Z CHANGE PRESSURE DRESSING ON LEFT LOWER EXTREMITY: ICD-10-PCS | Performed by: SURGERY

## 2018-05-05 RX ADMIN — POTASSIUM CHLORIDE SCH MEQ: 20 TABLET, EXTENDED RELEASE ORAL at 17:00

## 2018-05-05 RX ADMIN — POTASSIUM CHLORIDE SCH MEQ: 20 TABLET, EXTENDED RELEASE ORAL at 07:52

## 2018-05-05 RX ADMIN — HEPARIN SODIUM SCH UNITS: 5000 INJECTION, SOLUTION INTRAVENOUS; SUBCUTANEOUS at 16:52

## 2018-05-05 RX ADMIN — HEPARIN SODIUM SCH UNITS: 5000 INJECTION, SOLUTION INTRAVENOUS; SUBCUTANEOUS at 00:00

## 2018-05-05 RX ADMIN — SODIUM CHLORIDE SCH MLS/HR: 0.9 INJECTION, SOLUTION INTRAVENOUS at 05:51

## 2018-05-05 RX ADMIN — CLINDAMYCIN IN 5 PERCENT DEXTROSE SCH MLS/HR: 12 INJECTION, SOLUTION INTRAVENOUS at 22:49

## 2018-05-05 RX ADMIN — HEPARIN SODIUM SCH UNITS: 5000 INJECTION, SOLUTION INTRAVENOUS; SUBCUTANEOUS at 07:52

## 2018-05-06 VITALS — SYSTOLIC BLOOD PRESSURE: 147 MMHG | DIASTOLIC BLOOD PRESSURE: 78 MMHG

## 2018-05-06 VITALS — DIASTOLIC BLOOD PRESSURE: 98 MMHG | SYSTOLIC BLOOD PRESSURE: 167 MMHG

## 2018-05-06 VITALS — DIASTOLIC BLOOD PRESSURE: 81 MMHG | SYSTOLIC BLOOD PRESSURE: 140 MMHG

## 2018-05-06 VITALS — DIASTOLIC BLOOD PRESSURE: 95 MMHG | SYSTOLIC BLOOD PRESSURE: 166 MMHG

## 2018-05-06 LAB
ALBUMIN SERPL-MCNC: 1.6 G/DL (ref 3.4–5)
ALP SERPL-CCNC: 86 U/L (ref 45–117)
ALT SERPL-CCNC: 49 U/L (ref 12–78)
ANION GAP SERPL CALC-SCNC: 9 MMOL/L (ref 5–15)
BASOPHILS # BLD AUTO: 0.05 X10^3/UL (ref 0–0.1)
BASOPHILS NFR BLD AUTO: 1 % (ref 0–1)
BILIRUB SERPL-MCNC: 0.4 MG/DL (ref 0.2–1)
CALCIUM SERPL-MCNC: 7.6 MG/DL (ref 8.5–10.1)
CHLORIDE SERPL-SCNC: 110 MMOL/L (ref 98–107)
CREAT SERPL-MCNC: 0.53 MG/DL (ref 0.7–1.3)
EOSINOPHIL # BLD AUTO: 0.12 X10^3/UL (ref 0–0.4)
EOSINOPHIL NFR BLD AUTO: 2 % (ref 1–7)
ERYTHROCYTE [DISTWIDTH] IN BLOOD BY AUTOMATED COUNT: 14.8 % (ref 9.4–14.8)
LYMPHOCYTES # BLD AUTO: 0.92 X10^3/UL (ref 1–3.4)
LYMPHOCYTES NFR BLD AUTO: 15 % (ref 22–44)
MCH RBC QN AUTO: 32.6 PG (ref 27.5–34.5)
MCHC RBC AUTO-ENTMCNC: 33.1 G/DL (ref 33.2–36.2)
MCV RBC AUTO: 98.6 FL (ref 81–97)
MD: NO
MONOCYTES # BLD AUTO: 0.76 X10^3/UL (ref 0.2–0.8)
MONOCYTES NFR BLD AUTO: 13 % (ref 2–9)
NEUTROPHILS # BLD AUTO: 4.28 X10^3/UL (ref 1.8–6.8)
NEUTROPHILS NFR BLD AUTO: 70 % (ref 42–75)
PLATELET # BLD AUTO: 299 X10^3/UL (ref 130–400)
PMV BLD AUTO: 8.6 FL (ref 7.4–10.4)
PROT SERPL-MCNC: 5.2 G/DL (ref 6.4–8.2)
RBC # BLD AUTO: 2.6 X10^6/UL (ref 4.38–5.82)

## 2018-05-06 RX ADMIN — HEPARIN SODIUM SCH UNITS: 5000 INJECTION, SOLUTION INTRAVENOUS; SUBCUTANEOUS at 00:47

## 2018-05-06 RX ADMIN — DAPTOMYCIN SCH MLS/HR: 500 INJECTION, POWDER, LYOPHILIZED, FOR SOLUTION INTRAVENOUS at 00:45

## 2018-05-06 RX ADMIN — Medication SCH MG: at 08:48

## 2018-05-06 RX ADMIN — HEPARIN SODIUM SCH UNITS: 5000 INJECTION, SOLUTION INTRAVENOUS; SUBCUTANEOUS at 14:07

## 2018-05-06 RX ADMIN — CLINDAMYCIN IN 5 PERCENT DEXTROSE SCH MLS/HR: 12 INJECTION, SOLUTION INTRAVENOUS at 18:30

## 2018-05-06 RX ADMIN — POTASSIUM CHLORIDE SCH MEQ: 20 TABLET, EXTENDED RELEASE ORAL at 08:52

## 2018-05-06 RX ADMIN — DAPTOMYCIN SCH MLS/HR: 500 INJECTION, POWDER, LYOPHILIZED, FOR SOLUTION INTRAVENOUS at 22:05

## 2018-05-06 RX ADMIN — POTASSIUM CHLORIDE SCH MEQ: 20 TABLET, EXTENDED RELEASE ORAL at 18:31

## 2018-05-06 RX ADMIN — SODIUM CHLORIDE SCH MLS/HR: 0.9 INJECTION, SOLUTION INTRAVENOUS at 05:25

## 2018-05-06 RX ADMIN — AMLODIPINE BESYLATE SCH MG: 5 TABLET ORAL at 08:48

## 2018-05-06 RX ADMIN — CLINDAMYCIN IN 5 PERCENT DEXTROSE SCH MLS/HR: 12 INJECTION, SOLUTION INTRAVENOUS at 11:01

## 2018-05-06 RX ADMIN — CLINDAMYCIN IN 5 PERCENT DEXTROSE SCH MLS/HR: 12 INJECTION, SOLUTION INTRAVENOUS at 05:23

## 2018-05-06 RX ADMIN — HEPARIN SODIUM SCH UNITS: 5000 INJECTION, SOLUTION INTRAVENOUS; SUBCUTANEOUS at 22:06

## 2018-05-07 VITALS — DIASTOLIC BLOOD PRESSURE: 81 MMHG | SYSTOLIC BLOOD PRESSURE: 160 MMHG

## 2018-05-07 VITALS — DIASTOLIC BLOOD PRESSURE: 74 MMHG | SYSTOLIC BLOOD PRESSURE: 136 MMHG

## 2018-05-07 VITALS — DIASTOLIC BLOOD PRESSURE: 57 MMHG | SYSTOLIC BLOOD PRESSURE: 123 MMHG

## 2018-05-07 VITALS — SYSTOLIC BLOOD PRESSURE: 135 MMHG | DIASTOLIC BLOOD PRESSURE: 84 MMHG

## 2018-05-07 LAB
ALBUMIN SERPL-MCNC: 1.9 G/DL (ref 3.4–5)
ALP SERPL-CCNC: 85 U/L (ref 45–117)
ALT SERPL-CCNC: 44 U/L (ref 12–78)
ANION GAP SERPL CALC-SCNC: 6 MMOL/L (ref 5–15)
BASOPHILS # BLD AUTO: 0.03 X10^3/UL (ref 0–0.1)
BASOPHILS NFR BLD AUTO: 1 % (ref 0–1)
BILIRUB SERPL-MCNC: 0.5 MG/DL (ref 0.2–1)
CALCIUM SERPL-MCNC: 7.4 MG/DL (ref 8.5–10.1)
CHLORIDE SERPL-SCNC: 105 MMOL/L (ref 98–107)
CREAT SERPL-MCNC: 0.55 MG/DL (ref 0.7–1.3)
CRP SERPL-MCNC: 3.7 MG/DL (ref 0.02–0.49)
EOSINOPHIL # BLD AUTO: 0.17 X10^3/UL (ref 0–0.4)
EOSINOPHIL NFR BLD AUTO: 3 % (ref 1–7)
ERYTHROCYTE [DISTWIDTH] IN BLOOD BY AUTOMATED COUNT: 15.2 % (ref 9.4–14.8)
ERYTHROCYTE [SEDIMENTATION RATE] IN BLOOD BY WESTERGREN METHOD: 87 MM/HR (ref 0–10)
HCT (SEDRATE): 25.6 % (ref 39.2–51.8)
LYMPHOCYTES # BLD AUTO: 1.09 X10^3/UL (ref 1–3.4)
LYMPHOCYTES NFR BLD AUTO: 21 % (ref 22–44)
MCH RBC QN AUTO: 33.1 PG (ref 27.5–34.5)
MCHC RBC AUTO-ENTMCNC: 33.7 G/DL (ref 33.2–36.2)
MCV RBC AUTO: 98.1 FL (ref 81–97)
MD: NO
MONOCYTES # BLD AUTO: 0.45 X10^3/UL (ref 0.2–0.8)
MONOCYTES NFR BLD AUTO: 9 % (ref 2–9)
NEUTROPHILS # BLD AUTO: 3.45 X10^3/UL (ref 1.8–6.8)
NEUTROPHILS NFR BLD AUTO: 66 % (ref 42–75)
PLATELET # BLD AUTO: 370 X10^3/UL (ref 130–400)
PMV BLD AUTO: 8.3 FL (ref 7.4–10.4)
PROT SERPL-MCNC: 5.5 G/DL (ref 6.4–8.2)
RBC # BLD AUTO: 2.71 X10^6/UL (ref 4.38–5.82)

## 2018-05-07 RX ADMIN — POTASSIUM CHLORIDE SCH MEQ: 20 TABLET, EXTENDED RELEASE ORAL at 10:40

## 2018-05-07 RX ADMIN — CLINDAMYCIN IN 5 PERCENT DEXTROSE SCH MLS/HR: 12 INJECTION, SOLUTION INTRAVENOUS at 00:27

## 2018-05-07 RX ADMIN — HEPARIN SODIUM SCH UNITS: 5000 INJECTION, SOLUTION INTRAVENOUS; SUBCUTANEOUS at 21:54

## 2018-05-07 RX ADMIN — OXYCODONE HYDROCHLORIDE PRN MG: 5 TABLET ORAL at 13:54

## 2018-05-07 RX ADMIN — SODIUM CHLORIDE SCH MLS/HR: 0.9 INJECTION, SOLUTION INTRAVENOUS at 05:31

## 2018-05-07 RX ADMIN — CLINDAMYCIN IN 5 PERCENT DEXTROSE SCH MLS/HR: 12 INJECTION, SOLUTION INTRAVENOUS at 13:14

## 2018-05-07 RX ADMIN — LISINOPRIL SCH MG: 10 TABLET ORAL at 10:40

## 2018-05-07 RX ADMIN — OXYCODONE HYDROCHLORIDE PRN MG: 5 TABLET ORAL at 18:49

## 2018-05-07 RX ADMIN — DAPTOMYCIN SCH MLS/HR: 500 INJECTION, POWDER, LYOPHILIZED, FOR SOLUTION INTRAVENOUS at 22:57

## 2018-05-07 RX ADMIN — CLINDAMYCIN IN 5 PERCENT DEXTROSE SCH MLS/HR: 12 INJECTION, SOLUTION INTRAVENOUS at 05:30

## 2018-05-07 RX ADMIN — AMLODIPINE BESYLATE SCH MG: 5 TABLET ORAL at 10:40

## 2018-05-07 RX ADMIN — HEPARIN SODIUM SCH UNITS: 5000 INJECTION, SOLUTION INTRAVENOUS; SUBCUTANEOUS at 13:52

## 2018-05-07 RX ADMIN — Medication SCH TAB: at 10:39

## 2018-05-07 RX ADMIN — Medication SCH MG: at 10:41

## 2018-05-07 RX ADMIN — OXYCODONE HYDROCHLORIDE PRN MG: 5 TABLET ORAL at 21:55

## 2018-05-07 RX ADMIN — POTASSIUM CHLORIDE SCH MEQ: 20 TABLET, EXTENDED RELEASE ORAL at 18:49

## 2018-05-07 RX ADMIN — HEPARIN SODIUM SCH UNITS: 5000 INJECTION, SOLUTION INTRAVENOUS; SUBCUTANEOUS at 05:31

## 2018-05-07 RX ADMIN — CLINDAMYCIN IN 5 PERCENT DEXTROSE SCH MLS/HR: 12 INJECTION, SOLUTION INTRAVENOUS at 18:51

## 2018-05-08 VITALS — DIASTOLIC BLOOD PRESSURE: 65 MMHG | SYSTOLIC BLOOD PRESSURE: 125 MMHG

## 2018-05-08 VITALS — SYSTOLIC BLOOD PRESSURE: 134 MMHG | DIASTOLIC BLOOD PRESSURE: 64 MMHG

## 2018-05-08 VITALS — SYSTOLIC BLOOD PRESSURE: 132 MMHG | DIASTOLIC BLOOD PRESSURE: 71 MMHG

## 2018-05-08 VITALS — SYSTOLIC BLOOD PRESSURE: 145 MMHG | DIASTOLIC BLOOD PRESSURE: 75 MMHG

## 2018-05-08 LAB
ALBUMIN SERPL-MCNC: 1.7 G/DL (ref 3.4–5)
ALP SERPL-CCNC: 77 U/L (ref 45–117)
ALT SERPL-CCNC: 35 U/L (ref 12–78)
ANION GAP SERPL CALC-SCNC: 7 MMOL/L (ref 5–15)
BASOPHILS # BLD AUTO: 0.07 X10^3/UL (ref 0–0.1)
BASOPHILS NFR BLD AUTO: 1 % (ref 0–1)
BILIRUB SERPL-MCNC: 0.6 MG/DL (ref 0.2–1)
CALCIUM SERPL-MCNC: 7.5 MG/DL (ref 8.5–10.1)
CHLORIDE SERPL-SCNC: 105 MMOL/L (ref 98–107)
CREAT SERPL-MCNC: 0.6 MG/DL (ref 0.7–1.3)
EOSINOPHIL # BLD AUTO: 0.11 X10^3/UL (ref 0–0.4)
EOSINOPHIL NFR BLD AUTO: 2 % (ref 1–7)
ERYTHROCYTE [DISTWIDTH] IN BLOOD BY AUTOMATED COUNT: 15.5 % (ref 9.4–14.8)
LYMPHOCYTES # BLD AUTO: 1.17 X10^3/UL (ref 1–3.4)
LYMPHOCYTES NFR BLD AUTO: 20 % (ref 22–44)
MCH RBC QN AUTO: 32.6 PG (ref 27.5–34.5)
MCHC RBC AUTO-ENTMCNC: 33.4 G/DL (ref 33.2–36.2)
MCV RBC AUTO: 97.7 FL (ref 81–97)
MD: NO
MONOCYTES # BLD AUTO: 0.48 X10^3/UL (ref 0.2–0.8)
MONOCYTES NFR BLD AUTO: 8 % (ref 2–9)
NEUTROPHILS # BLD AUTO: 4.02 X10^3/UL (ref 1.8–6.8)
NEUTROPHILS NFR BLD AUTO: 69 % (ref 42–75)
PLATELET # BLD AUTO: 444 X10^3/UL (ref 130–400)
PMV BLD AUTO: 8.4 FL (ref 7.4–10.4)
PROT SERPL-MCNC: 5.3 G/DL (ref 6.4–8.2)
RBC # BLD AUTO: 2.82 X10^6/UL (ref 4.38–5.82)

## 2018-05-08 RX ADMIN — CLINDAMYCIN IN 5 PERCENT DEXTROSE SCH MLS/HR: 12 INJECTION, SOLUTION INTRAVENOUS at 00:11

## 2018-05-08 RX ADMIN — HEPARIN SODIUM SCH UNITS: 5000 INJECTION, SOLUTION INTRAVENOUS; SUBCUTANEOUS at 16:38

## 2018-05-08 RX ADMIN — Medication SCH MG: at 09:49

## 2018-05-08 RX ADMIN — POTASSIUM CHLORIDE SCH MEQ: 20 TABLET, EXTENDED RELEASE ORAL at 09:49

## 2018-05-08 RX ADMIN — HEPARIN SODIUM SCH UNITS: 5000 INJECTION, SOLUTION INTRAVENOUS; SUBCUTANEOUS at 23:50

## 2018-05-08 RX ADMIN — Medication SCH TAB: at 09:49

## 2018-05-08 RX ADMIN — HEPARIN SODIUM SCH UNITS: 5000 INJECTION, SOLUTION INTRAVENOUS; SUBCUTANEOUS at 06:18

## 2018-05-08 RX ADMIN — SODIUM CHLORIDE SCH MLS/HR: 0.9 INJECTION, SOLUTION INTRAVENOUS at 06:19

## 2018-05-08 RX ADMIN — POTASSIUM CHLORIDE SCH MEQ: 20 TABLET, EXTENDED RELEASE ORAL at 16:38

## 2018-05-08 RX ADMIN — CLINDAMYCIN IN 5 PERCENT DEXTROSE SCH MLS/HR: 12 INJECTION, SOLUTION INTRAVENOUS at 06:18

## 2018-05-08 RX ADMIN — LISINOPRIL SCH MG: 10 TABLET ORAL at 16:38

## 2018-05-08 RX ADMIN — OXYCODONE HYDROCHLORIDE PRN MG: 5 TABLET ORAL at 09:49

## 2018-05-08 RX ADMIN — DAPTOMYCIN SCH MLS/HR: 500 INJECTION, POWDER, LYOPHILIZED, FOR SOLUTION INTRAVENOUS at 22:46

## 2018-05-08 RX ADMIN — AMLODIPINE BESYLATE SCH MG: 5 TABLET ORAL at 09:50

## 2018-05-08 RX ADMIN — OXYCODONE HYDROCHLORIDE PRN MG: 5 TABLET ORAL at 06:18

## 2018-05-08 RX ADMIN — AMLODIPINE BESYLATE SCH MG: 5 TABLET ORAL at 09:55

## 2018-05-08 RX ADMIN — OXYCODONE HYDROCHLORIDE PRN MG: 5 TABLET ORAL at 20:08

## 2018-05-09 VITALS — SYSTOLIC BLOOD PRESSURE: 144 MMHG | DIASTOLIC BLOOD PRESSURE: 84 MMHG

## 2018-05-09 VITALS — SYSTOLIC BLOOD PRESSURE: 118 MMHG | DIASTOLIC BLOOD PRESSURE: 63 MMHG

## 2018-05-09 VITALS — DIASTOLIC BLOOD PRESSURE: 74 MMHG | SYSTOLIC BLOOD PRESSURE: 125 MMHG

## 2018-05-09 PROCEDURE — B5181ZA FLUOROSCOPY OF SUPERIOR VENA CAVA USING LOW OSMOLAR CONTRAST, GUIDANCE: ICD-10-PCS | Performed by: RADIOLOGY

## 2018-05-09 PROCEDURE — 02HV33Z INSERTION OF INFUSION DEVICE INTO SUPERIOR VENA CAVA, PERCUTANEOUS APPROACH: ICD-10-PCS | Performed by: RADIOLOGY

## 2018-05-09 RX ADMIN — OXYCODONE HYDROCHLORIDE PRN MG: 5 TABLET ORAL at 10:38

## 2018-05-09 RX ADMIN — Medication SCH MG: at 08:38

## 2018-05-09 RX ADMIN — SODIUM CHLORIDE SCH MLS/HR: 0.9 INJECTION, SOLUTION INTRAVENOUS at 02:46

## 2018-05-09 RX ADMIN — Medication SCH TAB: at 08:38

## 2018-05-09 RX ADMIN — LISINOPRIL SCH MG: 10 TABLET ORAL at 08:38

## 2018-07-16 ENCOUNTER — HOSPITAL ENCOUNTER (EMERGENCY)
Facility: MEDICAL CENTER | Age: 63
End: 2018-07-16
Attending: EMERGENCY MEDICINE
Payer: MEDICAID

## 2018-07-16 ENCOUNTER — HOME HEALTH ADMISSION (OUTPATIENT)
Dept: HOME HEALTH SERVICES | Facility: HOME HEALTHCARE | Age: 63
End: 2018-07-16
Payer: MEDICAID

## 2018-07-16 VITALS
HEIGHT: 70 IN | DIASTOLIC BLOOD PRESSURE: 61 MMHG | RESPIRATION RATE: 14 BRPM | SYSTOLIC BLOOD PRESSURE: 95 MMHG | WEIGHT: 150 LBS | BODY MASS INDEX: 21.47 KG/M2 | TEMPERATURE: 97.1 F | OXYGEN SATURATION: 94 % | HEART RATE: 67 BPM

## 2018-07-16 DIAGNOSIS — T67.9XXA HEAT EXPOSURE, INITIAL ENCOUNTER: ICD-10-CM

## 2018-07-16 LAB
ALBUMIN SERPL BCP-MCNC: 3.4 G/DL (ref 3.2–4.9)
ALBUMIN/GLOB SERPL: 0.9 G/DL
ALP SERPL-CCNC: 62 U/L (ref 30–99)
ALT SERPL-CCNC: 10 U/L (ref 2–50)
ANION GAP SERPL CALC-SCNC: 17 MMOL/L (ref 0–11.9)
AST SERPL-CCNC: 16 U/L (ref 12–45)
BASOPHILS # BLD AUTO: 0.4 % (ref 0–1.8)
BASOPHILS # BLD: 0.05 K/UL (ref 0–0.12)
BILIRUB SERPL-MCNC: 0.3 MG/DL (ref 0.1–1.5)
BUN SERPL-MCNC: 23 MG/DL (ref 8–22)
CALCIUM SERPL-MCNC: 9.5 MG/DL (ref 8.5–10.5)
CHLORIDE SERPL-SCNC: 92 MMOL/L (ref 96–112)
CO2 SERPL-SCNC: 24 MMOL/L (ref 20–33)
CREAT SERPL-MCNC: 1.32 MG/DL (ref 0.5–1.4)
EKG IMPRESSION: NORMAL
EKG IMPRESSION: NORMAL
EOSINOPHIL # BLD AUTO: 0.01 K/UL (ref 0–0.51)
EOSINOPHIL NFR BLD: 0.1 % (ref 0–6.9)
ERYTHROCYTE [DISTWIDTH] IN BLOOD BY AUTOMATED COUNT: 49.7 FL (ref 35.9–50)
GLOBULIN SER CALC-MCNC: 3.9 G/DL (ref 1.9–3.5)
GLUCOSE SERPL-MCNC: 107 MG/DL (ref 65–99)
HCT VFR BLD AUTO: 29 % (ref 42–52)
HGB BLD-MCNC: 9.3 G/DL (ref 14–18)
IMM GRANULOCYTES # BLD AUTO: 0.15 K/UL (ref 0–0.11)
IMM GRANULOCYTES NFR BLD AUTO: 1.2 % (ref 0–0.9)
LYMPHOCYTES # BLD AUTO: 0.67 K/UL (ref 1–4.8)
LYMPHOCYTES NFR BLD: 5.3 % (ref 22–41)
MCH RBC QN AUTO: 26.4 PG (ref 27–33)
MCHC RBC AUTO-ENTMCNC: 32.1 G/DL (ref 33.7–35.3)
MCV RBC AUTO: 82.4 FL (ref 81.4–97.8)
MONOCYTES # BLD AUTO: 1.11 K/UL (ref 0–0.85)
MONOCYTES NFR BLD AUTO: 8.8 % (ref 0–13.4)
NEUTROPHILS # BLD AUTO: 10.66 K/UL (ref 1.82–7.42)
NEUTROPHILS NFR BLD: 84.2 % (ref 44–72)
NRBC # BLD AUTO: 0 K/UL
NRBC BLD-RTO: 0 /100 WBC
PLATELET # BLD AUTO: 660 K/UL (ref 164–446)
PMV BLD AUTO: 8.7 FL (ref 9–12.9)
POTASSIUM SERPL-SCNC: 5 MMOL/L (ref 3.6–5.5)
PROT SERPL-MCNC: 7.3 G/DL (ref 6–8.2)
RBC # BLD AUTO: 3.52 M/UL (ref 4.7–6.1)
SODIUM SERPL-SCNC: 133 MMOL/L (ref 135–145)
WBC # BLD AUTO: 12.7 K/UL (ref 4.8–10.8)

## 2018-07-16 PROCEDURE — 36415 COLL VENOUS BLD VENIPUNCTURE: CPT

## 2018-07-16 PROCEDURE — 99284 EMERGENCY DEPT VISIT MOD MDM: CPT

## 2018-07-16 PROCEDURE — 96360 HYDRATION IV INFUSION INIT: CPT

## 2018-07-16 PROCEDURE — 93005 ELECTROCARDIOGRAM TRACING: CPT

## 2018-07-16 PROCEDURE — 700105 HCHG RX REV CODE 258: Performed by: EMERGENCY MEDICINE

## 2018-07-16 PROCEDURE — 85025 COMPLETE CBC W/AUTO DIFF WBC: CPT

## 2018-07-16 PROCEDURE — 93005 ELECTROCARDIOGRAM TRACING: CPT | Performed by: EMERGENCY MEDICINE

## 2018-07-16 PROCEDURE — 80053 COMPREHEN METABOLIC PANEL: CPT

## 2018-07-16 RX ORDER — SODIUM CHLORIDE 9 MG/ML
1000 INJECTION, SOLUTION INTRAVENOUS ONCE
Status: COMPLETED | OUTPATIENT
Start: 2018-07-16 | End: 2018-07-16

## 2018-07-16 RX ADMIN — SODIUM CHLORIDE 1000 ML: 9 INJECTION, SOLUTION INTRAVENOUS at 18:17

## 2018-07-16 ASSESSMENT — PAIN SCALES - GENERAL
PAINLEVEL_OUTOF10: 0
PAINLEVEL_OUTOF10: 0

## 2018-07-16 ASSESSMENT — LIFESTYLE VARIABLES
TOTAL SCORE: 1
CONSUMPTION TOTAL: INCOMPLETE
HAVE PEOPLE ANNOYED YOU BY CRITICIZING YOUR DRINKING: NO
HAVE YOU EVER FELT YOU SHOULD CUT DOWN ON YOUR DRINKING: YES
TOTAL SCORE: 1
EVER FELT BAD OR GUILTY ABOUT YOUR DRINKING: NO
DO YOU DRINK ALCOHOL: YES
EVER HAD A DRINK FIRST THING IN THE MORNING TO STEADY YOUR NERVES TO GET RID OF A HANGOVER: NO
TOTAL SCORE: 1

## 2018-07-16 NOTE — ED TRIAGE NOTES
"Bola Varela 62 y.o. male to triage via w/c for     Chief Complaint   Patient presents with   • Abdominal Pain     \"I was sitting in the park when I started feeling eeerk awful.  I don't feel good at all\".  Pt answers \"a little\" to abdominal pain.  Pt denies nausea.   • Fatigue     \"no energy, just uugggh\"     Pt reports he has been outside in the sun and may have overheated.  Pt reports he was living in \"some house and you have to blow when you come in and blow when you leave\".  Pt now homeless.  Pt reports he has not had much to eat or drink today.  BP (!) 95/61   Pulse (!) 123   Temp 36.2 °C (97.1 °F)   Resp 18   Ht 1.778 m (5' 10\")   Wt 68 kg (150 lb)   SpO2 98%   BMI 21.52 kg/m²     "

## 2018-07-17 NOTE — DISCHARGE INSTRUCTIONS
Heat Disorders  Heat related disorders are illnesses caused by continued exposure to hot and humid environments, not drinking enough fluids, and/or your body failing to regulate its temperature correctly. People suffer from heat stress and heat related disorders when their bodies are unable to compensate and cool down through sweating. With sufficient heat, sweating is not enough to keep you cool, and your body temperature can rise quickly. Very high body temperatures can damage your brain and other vital organs. High humidity (moisture in the air), adds to heat stress, because it is harder for sweat to evaporate and cool your body. Heat stress and disorders are not uncommon. Some medicines can increase your risk for heat related illness. Ask your caregiver about your medicines during periods of intense heat.   Heat related disorders include:  · Heatstroke. When you cannot sweat or regulate your body temperature in an adequate way. This is very dangerous and can be life threatening. Get emergency medical help.  · Heat exhaustion. Overheating causes heavy sweating and a fast heart rate. Your body can still regulate its own temperature.  · Heat cramps. Painful, uncontrollable muscle spasms. Can occur during heavy exercise in hot environments.  · Sunburn. Skin becomes red and painful (burned) after being out in the sun.  · Heat rash. Sweat ducts become blocked, which traps sweat under the skin. This causes blisters and red bumps and may cause an itchy or tingling feeling.  PREVENTING HEAT STRESS AND HEAT RELATED DISORDERS  Overheating can be dangerous and life threatening. When exercising, working, or doing other activities in hot and humid environments, do the following:  · Stay informed by listening to and watching local broadcast weather and safety updates during intense heat.  · Air conditioning is the best way to prevent heat disorders. If your home is not air conditioned, spend time in air conditioned places  "(malls, public libraries, or heat shelters set up by your local health department).  · Wear light-weight, light colored, loose fitting clothing. Wear as little clothing as possible when at home.  · Increase your fluid intake. Drink enough water and fluids to keep your urine clear or pale yellow. DO NOT WAIT UNTIL YOU ARE THIRSTY TO DRINK. You may already be heat stressed, and not recognize it.  · If your caregiver has suggested that you limit the amount of fluid you drink or has prescribed water pills for a medical problem, ask how much you should drink when the weather is hot.  · Do not drink liquids with alcohol, caffeine, or lots of sugar. They can cause more loss of body fluid.  · Heavy sweating drains your body's salt and minerals, which must be replaced. If you must exercise in the heat, a sports beverage can replace the salt and minerals you lose in sweat. If you are on a low-salt diet, check with your caregiver before drinking a sports beverage.  · Sunburn reduces your body's ability to cool itself and causes a loss of needed body fluids. If you go outdoors, protect yourself from the sun by wearing a wide-brimmed hat, along with sunglasses.  · Put on sunscreen of SPF 15 or higher, 30 minutes before going out. (The most effective products say \"broad spectrum\" or \"UVA/UVB protection\" on the label.) Reapply sunscreen frequently -- at least every 1-2 hours.  · Take added precautions when both the heat and humidity are high.  · Rest often.  · Even young and otherwise healthy people can become heat stressed and suffer from a heat disorder, if they participate in strenuous activities during hot weather.  · If you must be outdoors, try going out only during morning and evening hours, when it is cooler. Rest often in shady areas, so that your body's temperature can adjust.  · If your heart pounds or you are gasping for breath, STOP all activity. Go immediately to a cool area, or at least into the shade, and rest. " This is especially true if you become lightheaded, confused, weak, or faint.  · Electric fans may make you comfortable, but they DO NOT prevent heat related problems.  SYMPTOMS   · Headache.  · Nosebleed.  · Weakness.  · You feel very hot.  · Muscle cramps.  · Restlessness.  · Fainting or dizziness.  · Fast breathing and shortness of breath.  · Excessive sweating. (There may be little or no sweating in late stages of heat exhaustion.)  · Rapid pulse, heart pounding.  · Feeling sick to your stomach (nauseous, vomiting).  · Skin becoming cold and clammy, or excessively hot and dry.  HOME CARE INSTRUCTIONS   · Lie down and rest in a cool or air conditioned area.  · Drink enough water and fluids to keep your urine clear or pale yellow. Avoid fluids with caffeine or high sugar content. Avoid coffee, tea, alcohol or stimulants.  · Do not take salt tablets, unless advised by your caregiver.  · Avoid hot foods and heavy meals.  · Bathe or shower in cool water.  · Wear minimal clothing.  · Use a fan. Add cool or warm mist to the air, if possible.  · If possible, decrease the use of your stove or oven at home.  · Monitor adults at risk at least twice a day, watching closely for signs of heat exhaustion or heat stroke. Infants and young children also require more frequent watching.  · Never leave infants, children or pets in a parked car, even if the windows are cracked open.  · If you are 65 years of age or older, have a friend or relative call to check on you twice a day during a heat wave. If you know someone in this age group, check on them at least twice a day.  SEEK IMMEDIATE MEDICAL CARE IF:  · You have a hard time breathing.  · You vomit or pass blood in your stool.  · You have a seizure, feel dizzy or faint, or pass out.  · You develop severe sweating.  · Your skin is red, hot and dry (there is no sweating).  · Your urine turns a dark color or has blood in it.  · You are making very little or no urine.  · You are  unable to keep fluids down.  · You develop chest or abdominal pain.  · You develop a throbbing headache.  · You develop nausea or confusion.  IF YOU OBSERVE SOMEONE WHO MIGHT HAVE HEAT STROKE  This can be life threatening. Call your local emergency services (951 in the [place]  · If the victim is in the sun, get him or her to a shady area.  · Cool the victim rapidly, using whatever methods you have:  · Place the victim in a tub of cool water or a cool shower.  · Spray the victim with cool water from a garden hose, or sponge the person with cool water.  · Wrap the victim in a cool, wet sheet and fan them.  · If emergency medical help is delayed, call the hospital emergency room or your local emergency services (911 in the [place] for further instructions.  · Sometimes a victim's muscles will begin to twitch from heat stroke. If this happens, keep the victim from injuring himself. However, do not place any object in the mouth. Give fluids, unless the muscle twitching makes it difficult or unsafe to do so. If there is vomiting, make sure the airway remains open by turning the victim on his or her side.  Document Released: 12/15/2001 Document Revised: 03/11/2013 Document Reviewed: 10/04/2010  [employer]® Patient Information ©2014 [employer].

## 2018-07-17 NOTE — ED PROVIDER NOTES
"ED Provider Note    CHIEF COMPLAINT  Chief Complaint   Patient presents with   • Abdominal Pain     \"I was sitting in the park when I started feeling eeerk awful.  I don't feel good at all\".  Pt answers \"a little\" to abdominal pain.  Pt denies nausea.   • Fatigue     \"no energy, just uugggh\"       HPI  Bola Varela is a 62 y.o. male who presents with multiple vague complaints.  He states he was out walking in the park today.  It is over 100° ambient temperature.  He states he has not been drinking much water.  He began feeling very lightheaded and just felt awful.  He keeps repeating the phrase that he just felt awful.  He is not able to give any real specifics.  The nurses note states abdominal pain but he states he may have had some very slight abdominal pain.  He has no pain now.  He feels better now that he is laying down.  He has had no nausea.  He denies fevers or chills.  He has had no cough or sputum production.  He has had no vomiting or diarrhea.  He denies any urinary symptoms.    REVIEW OF SYSTEMS  See HPI for further details. All other systems negative.    PAST MEDICAL HISTORY  Past Medical History:   Diagnosis Date   • Tobacco use        FAMILY HISTORY  Family History   Problem Relation Age of Onset   • No Known Problems Mother    • No Known Problems Father    • Cancer Neg Hx    • Diabetes Neg Hx    • Heart Disease Neg Hx    • Stroke Neg Hx        SOCIAL HISTORY  Social History     Social History   • Marital status: Single     Spouse name: N/A   • Number of children: N/A   • Years of education: N/A     Social History Main Topics   • Smoking status: Current Every Day Smoker     Packs/day: 0.10     Years: 40.00     Types: Cigarettes   • Smokeless tobacco: Never Used      Comment: 1 ppd until few years ago   • Alcohol use No      Comment: previous heavy alcohol use   • Drug use: No   • Sexual activity: Not Currently     Partners: Female     Other Topics Concern   • Not on file     Social History " "Narrative   • No narrative on file       SURGICAL HISTORY  Past Surgical History:   Procedure Laterality Date   • ANKLE ORIF Right        CURRENT MEDICATIONS  Home Medications    **Home medications have not yet been reviewed for this encounter**         ALLERGIES  No Known Allergies    PHYSICAL EXAM  VITAL SIGNS: BP (!) 95/61   Pulse (!) 123   Temp 36.2 °C (97.1 °F)   Resp 18   Ht 1.778 m (5' 10\")   Wt 68 kg (150 lb)   SpO2 98%   BMI 21.52 kg/m²   Constitutional: Well developed, Well nourished, No acute distress, Non-toxic appearance.   HENT: Normocephalic, Atraumatic.  Eyes:  EOMI, Conjunctiva normal, No discharge.   Cardiovascular: Tachycardic, Normal rhythm, No murmurs, No rubs, No gallops.   Thorax & Lungs: Clear to auscultation without wheezes, rales, or rhonchi.    Abdomen: Soft and nontender.  Skin: Warm, Dry.  Musculoskeletal: Good range of motion in all major joints.  Multiple large Band-Aid type dressings to the left thigh and extending down into the left calf region.  Neurologic: Alert and oriented.  Normal motor function.      COURSE & MEDICAL DECISION MAKING  Pertinent Labs & Imaging studies reviewed. (See chart for details)  This is a 62-year-old here for evaluation of not feeling well.  Patient is an exceedingly poor historian.  I ultimately was able to get a discharge summary from [facility].  On May 1 he was admitted to [facility] for necrotizing fasciitis.  He was seen by Dr. Alvares and had debridement performed.  A PICC line was placed and he received IV antibiotics.  On the ninth it states that he was discharged and sent to a long-term care facility where he had continued care.  I think this certainly explains the dressing on his left leg.  In speaking with the patient some more he states he was discharged and sent to a group home.  He states he does not like group home so he just walked away and shows to be homeless instead.  The patient was tachycardic on arrival " and I believe he did have symptoms of heat exposure therefore he is received a liter of normal saline which results in normalization of his heart rate.  His laboratories include a BUN of 23 with a normal creatinine suggesting some prerenal azotemia.  Sodium is low at 133.  CBC shows a white count of 12 with a differential of 84 polys and 5 lymphocytes.  I believe this is a stress response.  Hemoglobin is 9.  This is chronic based on his laboratory workup from May of this year.  At this point I do not think the patient requires acute hospitalization.  He chooses to be homeless and at this point will be discharged from the emergency department.  He is to follow-up with his primary care provider.  He is given a discharge instruction sheet on heat illnesses.    FINAL IMPRESSION  1.  Heat exhaustion  2.  Chronic anemia  3.  Homelessness  4.  Dehydration         Electronically signed by: Nino De Luna, 7/16/2018 5:10 PM

## 2018-07-18 ENCOUNTER — HOSPITAL ENCOUNTER (INPATIENT)
Dept: HOSPITAL 8 - ED | Age: 63
LOS: 5 days | Discharge: HOME | DRG: 603 | End: 2018-07-23
Attending: INTERNAL MEDICINE | Admitting: INTERNAL MEDICINE
Payer: MEDICAID

## 2018-07-18 VITALS — DIASTOLIC BLOOD PRESSURE: 84 MMHG | SYSTOLIC BLOOD PRESSURE: 129 MMHG

## 2018-07-18 VITALS — SYSTOLIC BLOOD PRESSURE: 149 MMHG | DIASTOLIC BLOOD PRESSURE: 88 MMHG

## 2018-07-18 VITALS — DIASTOLIC BLOOD PRESSURE: 85 MMHG | SYSTOLIC BLOOD PRESSURE: 155 MMHG

## 2018-07-18 VITALS — BODY MASS INDEX: 19.75 KG/M2 | WEIGHT: 141.1 LBS | HEIGHT: 71 IN

## 2018-07-18 VITALS — SYSTOLIC BLOOD PRESSURE: 145 MMHG | DIASTOLIC BLOOD PRESSURE: 76 MMHG

## 2018-07-18 VITALS — SYSTOLIC BLOOD PRESSURE: 158 MMHG | DIASTOLIC BLOOD PRESSURE: 87 MMHG

## 2018-07-18 VITALS — DIASTOLIC BLOOD PRESSURE: 87 MMHG | SYSTOLIC BLOOD PRESSURE: 158 MMHG

## 2018-07-18 VITALS — SYSTOLIC BLOOD PRESSURE: 163 MMHG | DIASTOLIC BLOOD PRESSURE: 84 MMHG

## 2018-07-18 DIAGNOSIS — I10: ICD-10-CM

## 2018-07-18 DIAGNOSIS — F10.239: ICD-10-CM

## 2018-07-18 DIAGNOSIS — Z79.899: ICD-10-CM

## 2018-07-18 DIAGNOSIS — L03.116: Primary | ICD-10-CM

## 2018-07-18 DIAGNOSIS — G89.29: ICD-10-CM

## 2018-07-18 DIAGNOSIS — Z59.0: ICD-10-CM

## 2018-07-18 DIAGNOSIS — E44.0: ICD-10-CM

## 2018-07-18 DIAGNOSIS — F17.210: ICD-10-CM

## 2018-07-18 LAB
ALBUMIN SERPL-MCNC: 3 G/DL (ref 3.4–5)
ANION GAP SERPL CALC-SCNC: 11 MMOL/L (ref 5–15)
BASOPHILS # BLD AUTO: 0.06 X10^3/UL (ref 0–0.1)
BASOPHILS NFR BLD AUTO: 1 % (ref 0–1)
CALCIUM SERPL-MCNC: 9.6 MG/DL (ref 8.5–10.1)
CHLORIDE SERPL-SCNC: 100 MMOL/L (ref 98–107)
CREAT SERPL-MCNC: 1.16 MG/DL (ref 0.7–1.3)
EOSINOPHIL # BLD AUTO: 0.01 X10^3/UL (ref 0–0.4)
EOSINOPHIL NFR BLD AUTO: 0 % (ref 1–7)
ERYTHROCYTE [DISTWIDTH] IN BLOOD BY AUTOMATED COUNT: 18.4 % (ref 9.4–14.8)
LYMPHOCYTES # BLD AUTO: 1.13 X10^3/UL (ref 1–3.4)
LYMPHOCYTES NFR BLD AUTO: 13 % (ref 22–44)
MCH RBC QN AUTO: 27 PG (ref 27.5–34.5)
MCHC RBC AUTO-ENTMCNC: 33.2 G/DL (ref 33.2–36.2)
MCV RBC AUTO: 81.4 FL (ref 81–97)
MD: NO
MONOCYTES # BLD AUTO: 1.02 X10^3/UL (ref 0.2–0.8)
MONOCYTES NFR BLD AUTO: 12 % (ref 2–9)
NEUTROPHILS # BLD AUTO: 6.18 X10^3/UL (ref 1.8–6.8)
NEUTROPHILS NFR BLD AUTO: 74 % (ref 42–75)
PLATELET # BLD AUTO: 869 X10^3/UL (ref 130–400)
PMV BLD AUTO: 6.9 FL (ref 7.4–10.4)
RBC # BLD AUTO: 3.67 X10^6/UL (ref 4.38–5.82)
TROPONIN I SERPL-MCNC: < 0.015 NG/ML (ref 0–0.04)

## 2018-07-18 PROCEDURE — 84100 ASSAY OF PHOSPHORUS: CPT

## 2018-07-18 PROCEDURE — 71045 X-RAY EXAM CHEST 1 VIEW: CPT

## 2018-07-18 PROCEDURE — 87205 SMEAR GRAM STAIN: CPT

## 2018-07-18 PROCEDURE — 96360 HYDRATION IV INFUSION INIT: CPT

## 2018-07-18 PROCEDURE — 84484 ASSAY OF TROPONIN QUANT: CPT

## 2018-07-18 PROCEDURE — 83735 ASSAY OF MAGNESIUM: CPT

## 2018-07-18 PROCEDURE — 82040 ASSAY OF SERUM ALBUMIN: CPT

## 2018-07-18 PROCEDURE — 87186 SC STD MICRODIL/AGAR DIL: CPT

## 2018-07-18 PROCEDURE — 87077 CULTURE AEROBIC IDENTIFY: CPT

## 2018-07-18 PROCEDURE — 36415 COLL VENOUS BLD VENIPUNCTURE: CPT

## 2018-07-18 PROCEDURE — 87070 CULTURE OTHR SPECIMN AEROBIC: CPT

## 2018-07-18 PROCEDURE — 85025 COMPLETE CBC W/AUTO DIFF WBC: CPT

## 2018-07-18 PROCEDURE — 80048 BASIC METABOLIC PNL TOTAL CA: CPT

## 2018-07-18 PROCEDURE — 86140 C-REACTIVE PROTEIN: CPT

## 2018-07-18 PROCEDURE — 93005 ELECTROCARDIOGRAM TRACING: CPT

## 2018-07-18 PROCEDURE — 85651 RBC SED RATE NONAUTOMATED: CPT

## 2018-07-18 PROCEDURE — 99285 EMERGENCY DEPT VISIT HI MDM: CPT

## 2018-07-18 RX ADMIN — LORAZEPAM PRN MG: 2 INJECTION INTRAMUSCULAR; INTRAVENOUS at 15:18

## 2018-07-18 RX ADMIN — ENOXAPARIN SODIUM SCH MG: 40 INJECTION SUBCUTANEOUS at 15:17

## 2018-07-18 RX ADMIN — POTASSIUM CHLORIDE SCH MLS/HR: 2 INJECTION, SOLUTION, CONCENTRATE INTRAVENOUS at 15:17

## 2018-07-18 RX ADMIN — HYDROCODONE BITARTRATE AND ACETAMINOPHEN PRN TAB: 5; 325 TABLET ORAL at 21:53

## 2018-07-18 RX ADMIN — LORAZEPAM PRN MG: 2 INJECTION INTRAMUSCULAR; INTRAVENOUS at 21:53

## 2018-07-18 SDOH — ECONOMIC STABILITY - HOUSING INSECURITY: HOMELESSNESS: Z59.0

## 2018-07-19 VITALS — DIASTOLIC BLOOD PRESSURE: 74 MMHG | SYSTOLIC BLOOD PRESSURE: 122 MMHG

## 2018-07-19 VITALS — DIASTOLIC BLOOD PRESSURE: 78 MMHG | SYSTOLIC BLOOD PRESSURE: 138 MMHG

## 2018-07-19 VITALS — DIASTOLIC BLOOD PRESSURE: 78 MMHG | SYSTOLIC BLOOD PRESSURE: 141 MMHG

## 2018-07-19 LAB
<PLATELET ESTIMATE>: (no result)
<PLT MORPHOLOGY>: (no result)
ANION GAP SERPL CALC-SCNC: 10 MMOL/L (ref 5–15)
ANISOCYTOSIS BLD QL SMEAR: (no result)
BAND#(MANUAL): 3.9 X10^3/UL
CALCIUM SERPL-MCNC: 8 MG/DL (ref 8.5–10.1)
CHLORIDE SERPL-SCNC: 106 MMOL/L (ref 98–107)
CREAT SERPL-MCNC: 1.11 MG/DL (ref 0.7–1.3)
CRP SERPL-MCNC: 4.4 MG/DL (ref 0.02–0.49)
ERYTHROCYTE [DISTWIDTH] IN BLOOD BY AUTOMATED COUNT: 17.9 % (ref 9.4–14.8)
ERYTHROCYTE [SEDIMENTATION RATE] IN BLOOD BY WESTERGREN METHOD: 75 MM/HR (ref 0–10)
HCT (SEDRATE): 29.9 % (ref 39.2–51.8)
MCH RBC QN AUTO: 26.7 PG (ref 27.5–34.5)
MCHC RBC AUTO-ENTMCNC: 32.6 G/DL (ref 33.2–36.2)
MCV RBC AUTO: 82 FL (ref 81–97)
MD: YES
NEUTS BAND NFR BLD: 20 % (ref 0–7)
PLATELET # BLD AUTO: 652 X10^3/UL (ref 130–400)
PMV BLD AUTO: 7.2 FL (ref 7.4–10.4)
RBC # BLD AUTO: 3.28 X10^6/UL (ref 4.38–5.82)
SEG#(MANUAL): 15.6 X10^3/UL (ref 1.8–6.8)
SEGS% (MANUAL): 80 % (ref 42–75)

## 2018-07-19 RX ADMIN — HYDROCODONE BITARTRATE AND ACETAMINOPHEN PRN TAB: 5; 325 TABLET ORAL at 18:04

## 2018-07-19 RX ADMIN — Medication SCH TAB: at 10:21

## 2018-07-19 RX ADMIN — AMLODIPINE BESYLATE SCH MG: 5 TABLET ORAL at 10:21

## 2018-07-19 RX ADMIN — LISINOPRIL SCH MG: 10 TABLET ORAL at 10:21

## 2018-07-19 RX ADMIN — POTASSIUM CHLORIDE SCH MLS/HR: 2 INJECTION, SOLUTION, CONCENTRATE INTRAVENOUS at 17:15

## 2018-07-19 RX ADMIN — HYDROCODONE BITARTRATE AND ACETAMINOPHEN PRN TAB: 5; 325 TABLET ORAL at 02:15

## 2018-07-19 RX ADMIN — LORAZEPAM PRN MG: 2 INJECTION INTRAMUSCULAR; INTRAVENOUS at 02:14

## 2018-07-19 RX ADMIN — ENOXAPARIN SODIUM SCH MG: 40 INJECTION SUBCUTANEOUS at 17:14

## 2018-07-20 VITALS — DIASTOLIC BLOOD PRESSURE: 70 MMHG | SYSTOLIC BLOOD PRESSURE: 122 MMHG

## 2018-07-20 VITALS — SYSTOLIC BLOOD PRESSURE: 121 MMHG | DIASTOLIC BLOOD PRESSURE: 76 MMHG

## 2018-07-20 VITALS — DIASTOLIC BLOOD PRESSURE: 70 MMHG | SYSTOLIC BLOOD PRESSURE: 124 MMHG

## 2018-07-20 VITALS — DIASTOLIC BLOOD PRESSURE: 79 MMHG | SYSTOLIC BLOOD PRESSURE: 126 MMHG

## 2018-07-20 LAB
ANION GAP SERPL CALC-SCNC: 9 MMOL/L (ref 5–15)
BASOPHILS # BLD AUTO: 0.08 X10^3/UL (ref 0–0.1)
BASOPHILS NFR BLD AUTO: 1 % (ref 0–1)
CALCIUM SERPL-MCNC: 8.4 MG/DL (ref 8.5–10.1)
CHLORIDE SERPL-SCNC: 107 MMOL/L (ref 98–107)
CREAT SERPL-MCNC: 0.67 MG/DL (ref 0.7–1.3)
EOSINOPHIL # BLD AUTO: 0.15 X10^3/UL (ref 0–0.4)
EOSINOPHIL NFR BLD AUTO: 2 % (ref 1–7)
ERYTHROCYTE [DISTWIDTH] IN BLOOD BY AUTOMATED COUNT: 18.3 % (ref 9.4–14.8)
LYMPHOCYTES # BLD AUTO: 1.2 X10^3/UL (ref 1–3.4)
LYMPHOCYTES NFR BLD AUTO: 16 % (ref 22–44)
MCH RBC QN AUTO: 27 PG (ref 27.5–34.5)
MCHC RBC AUTO-ENTMCNC: 33 G/DL (ref 33.2–36.2)
MCV RBC AUTO: 81.8 FL (ref 81–97)
MD: NO
MONOCYTES # BLD AUTO: 1.08 X10^3/UL (ref 0.2–0.8)
MONOCYTES NFR BLD AUTO: 14 % (ref 2–9)
NEUTROPHILS # BLD AUTO: 5.24 X10^3/UL (ref 1.8–6.8)
NEUTROPHILS NFR BLD AUTO: 68 % (ref 42–75)
PLATELET # BLD AUTO: 660 X10^3/UL (ref 130–400)
PMV BLD AUTO: 7.6 FL (ref 7.4–10.4)
RBC # BLD AUTO: 3.37 X10^6/UL (ref 4.38–5.82)

## 2018-07-20 RX ADMIN — ENOXAPARIN SODIUM SCH MG: 40 INJECTION SUBCUTANEOUS at 17:43

## 2018-07-20 RX ADMIN — FOLIC ACID SCH MG: 1 TABLET ORAL at 10:07

## 2018-07-20 RX ADMIN — HYDROCODONE BITARTRATE AND ACETAMINOPHEN PRN TAB: 5; 325 TABLET ORAL at 14:25

## 2018-07-20 RX ADMIN — AMLODIPINE BESYLATE SCH MG: 5 TABLET ORAL at 10:08

## 2018-07-20 RX ADMIN — HYDROCODONE BITARTRATE AND ACETAMINOPHEN PRN TAB: 5; 325 TABLET ORAL at 22:59

## 2018-07-20 RX ADMIN — Medication SCH MG: at 10:07

## 2018-07-20 RX ADMIN — Medication SCH TAB: at 10:08

## 2018-07-20 RX ADMIN — LISINOPRIL SCH MG: 10 TABLET ORAL at 10:08

## 2018-07-21 VITALS — DIASTOLIC BLOOD PRESSURE: 67 MMHG | SYSTOLIC BLOOD PRESSURE: 118 MMHG

## 2018-07-21 VITALS — DIASTOLIC BLOOD PRESSURE: 62 MMHG | SYSTOLIC BLOOD PRESSURE: 130 MMHG

## 2018-07-21 VITALS — DIASTOLIC BLOOD PRESSURE: 77 MMHG | SYSTOLIC BLOOD PRESSURE: 126 MMHG

## 2018-07-21 VITALS — DIASTOLIC BLOOD PRESSURE: 72 MMHG | SYSTOLIC BLOOD PRESSURE: 112 MMHG

## 2018-07-21 RX ADMIN — LISINOPRIL SCH MG: 10 TABLET ORAL at 09:19

## 2018-07-21 RX ADMIN — HYDROCODONE BITARTRATE AND ACETAMINOPHEN PRN TAB: 5; 325 TABLET ORAL at 20:55

## 2018-07-21 RX ADMIN — Medication SCH TAB: at 09:18

## 2018-07-21 RX ADMIN — ENOXAPARIN SODIUM SCH MG: 40 INJECTION SUBCUTANEOUS at 17:43

## 2018-07-21 RX ADMIN — AMLODIPINE BESYLATE SCH MG: 5 TABLET ORAL at 09:18

## 2018-07-21 RX ADMIN — FOLIC ACID SCH MG: 1 TABLET ORAL at 09:18

## 2018-07-21 RX ADMIN — Medication SCH MG: at 09:19

## 2018-07-22 VITALS — DIASTOLIC BLOOD PRESSURE: 60 MMHG | SYSTOLIC BLOOD PRESSURE: 126 MMHG

## 2018-07-22 VITALS — DIASTOLIC BLOOD PRESSURE: 76 MMHG | SYSTOLIC BLOOD PRESSURE: 110 MMHG

## 2018-07-22 VITALS — DIASTOLIC BLOOD PRESSURE: 67 MMHG | SYSTOLIC BLOOD PRESSURE: 111 MMHG

## 2018-07-22 VITALS — SYSTOLIC BLOOD PRESSURE: 112 MMHG | DIASTOLIC BLOOD PRESSURE: 68 MMHG

## 2018-07-22 RX ADMIN — LISINOPRIL SCH MG: 10 TABLET ORAL at 09:24

## 2018-07-22 RX ADMIN — Medication SCH TAB: at 09:24

## 2018-07-22 RX ADMIN — DOCUSATE SODIUM PRN MG: 100 CAPSULE, LIQUID FILLED ORAL at 15:45

## 2018-07-22 RX ADMIN — HYDROCODONE BITARTRATE AND ACETAMINOPHEN PRN TAB: 5; 325 TABLET ORAL at 19:40

## 2018-07-22 RX ADMIN — FOLIC ACID SCH MG: 1 TABLET ORAL at 09:24

## 2018-07-22 RX ADMIN — Medication SCH MG: at 09:24

## 2018-07-22 RX ADMIN — ENOXAPARIN SODIUM SCH MG: 40 INJECTION SUBCUTANEOUS at 15:46

## 2018-07-22 RX ADMIN — AMLODIPINE BESYLATE SCH MG: 5 TABLET ORAL at 09:24

## 2018-07-23 VITALS — SYSTOLIC BLOOD PRESSURE: 109 MMHG | DIASTOLIC BLOOD PRESSURE: 68 MMHG

## 2018-07-23 VITALS — SYSTOLIC BLOOD PRESSURE: 117 MMHG | DIASTOLIC BLOOD PRESSURE: 73 MMHG

## 2018-07-23 RX ADMIN — LISINOPRIL SCH MG: 10 TABLET ORAL at 07:59

## 2018-07-23 RX ADMIN — DOCUSATE SODIUM PRN MG: 100 CAPSULE, LIQUID FILLED ORAL at 07:59

## 2018-07-23 RX ADMIN — FOLIC ACID SCH MG: 1 TABLET ORAL at 07:59

## 2018-07-23 RX ADMIN — Medication SCH TAB: at 08:00

## 2018-07-23 RX ADMIN — Medication SCH MG: at 07:59

## 2018-07-23 RX ADMIN — AMLODIPINE BESYLATE SCH MG: 5 TABLET ORAL at 08:00

## 2018-07-24 ENCOUNTER — HOSPITAL ENCOUNTER (EMERGENCY)
Dept: HOSPITAL 8 - ED | Age: 63
Discharge: HOME | End: 2018-07-24
Payer: MEDICAID

## 2018-07-24 VITALS — BODY MASS INDEX: 18.71 KG/M2 | HEIGHT: 68 IN | WEIGHT: 123.46 LBS

## 2018-07-24 VITALS — DIASTOLIC BLOOD PRESSURE: 74 MMHG | SYSTOLIC BLOOD PRESSURE: 130 MMHG

## 2018-07-24 DIAGNOSIS — F10.20: ICD-10-CM

## 2018-07-24 DIAGNOSIS — G47.00: ICD-10-CM

## 2018-07-24 DIAGNOSIS — N30.90: Primary | ICD-10-CM

## 2018-07-24 DIAGNOSIS — G89.29: ICD-10-CM

## 2018-07-24 DIAGNOSIS — F17.200: ICD-10-CM

## 2018-07-24 LAB
ALBUMIN SERPL-MCNC: 3.1 G/DL (ref 3.4–5)
ALP SERPL-CCNC: 61 U/L (ref 45–117)
ALT SERPL-CCNC: 25 U/L (ref 12–78)
ANION GAP SERPL CALC-SCNC: 10 MMOL/L (ref 5–15)
BASOPHILS # BLD AUTO: 0.08 X10^3/UL (ref 0–0.1)
BASOPHILS NFR BLD AUTO: 1 % (ref 0–1)
BILIRUB SERPL-MCNC: 0.2 MG/DL (ref 0.2–1)
CALCIUM SERPL-MCNC: 9 MG/DL (ref 8.5–10.1)
CHLORIDE SERPL-SCNC: 105 MMOL/L (ref 98–107)
CREAT SERPL-MCNC: 1.03 MG/DL (ref 0.7–1.3)
CULTURE INDICATED?: YES
EOSINOPHIL # BLD AUTO: 0.06 X10^3/UL (ref 0–0.4)
EOSINOPHIL NFR BLD AUTO: 1 % (ref 1–7)
ERYTHROCYTE [DISTWIDTH] IN BLOOD BY AUTOMATED COUNT: 18.5 % (ref 9.4–14.8)
LYMPHOCYTES # BLD AUTO: 2 X10^3/UL (ref 1–3.4)
LYMPHOCYTES NFR BLD AUTO: 27 % (ref 22–44)
MCH RBC QN AUTO: 26.1 PG (ref 27.5–34.5)
MCHC RBC AUTO-ENTMCNC: 32.1 G/DL (ref 33.2–36.2)
MCV RBC AUTO: 81.5 FL (ref 81–97)
MD: NO
MICROSCOPIC: (no result)
MONOCYTES # BLD AUTO: 0.96 X10^3/UL (ref 0.2–0.8)
MONOCYTES NFR BLD AUTO: 13 % (ref 2–9)
NEUTROPHILS # BLD AUTO: 4.33 X10^3/UL (ref 1.8–6.8)
NEUTROPHILS NFR BLD AUTO: 58 % (ref 42–75)
PLATELET # BLD AUTO: 906 X10^3/UL (ref 130–400)
PMV BLD AUTO: 6.7 FL (ref 7.4–10.4)
PROT SERPL-MCNC: 8.1 G/DL (ref 6.4–8.2)
RBC # BLD AUTO: 3.83 X10^6/UL (ref 4.38–5.82)
TROPONIN I SERPL-MCNC: < 0.015 NG/ML (ref 0–0.04)

## 2018-07-24 PROCEDURE — 96365 THER/PROPH/DIAG IV INF INIT: CPT

## 2018-07-24 PROCEDURE — 71045 X-RAY EXAM CHEST 1 VIEW: CPT

## 2018-07-24 PROCEDURE — 80053 COMPREHEN METABOLIC PANEL: CPT

## 2018-07-24 PROCEDURE — 87086 URINE CULTURE/COLONY COUNT: CPT

## 2018-07-24 PROCEDURE — 87077 CULTURE AEROBIC IDENTIFY: CPT

## 2018-07-24 PROCEDURE — 84484 ASSAY OF TROPONIN QUANT: CPT

## 2018-07-24 PROCEDURE — 36415 COLL VENOUS BLD VENIPUNCTURE: CPT

## 2018-07-24 PROCEDURE — 87186 SC STD MICRODIL/AGAR DIL: CPT

## 2018-07-24 PROCEDURE — 93005 ELECTROCARDIOGRAM TRACING: CPT

## 2018-07-24 PROCEDURE — 81001 URINALYSIS AUTO W/SCOPE: CPT

## 2018-07-24 PROCEDURE — 85025 COMPLETE CBC W/AUTO DIFF WBC: CPT

## 2018-07-25 ENCOUNTER — HOSPITAL ENCOUNTER (EMERGENCY)
Dept: HOSPITAL 8 - ED | Age: 63
Discharge: HOME | End: 2018-07-25
Payer: MEDICAID

## 2018-07-25 VITALS — BODY MASS INDEX: 21.6 KG/M2 | HEIGHT: 71 IN | WEIGHT: 154.32 LBS

## 2018-07-25 VITALS — SYSTOLIC BLOOD PRESSURE: 124 MMHG | DIASTOLIC BLOOD PRESSURE: 70 MMHG

## 2018-07-25 DIAGNOSIS — G89.29: Primary | ICD-10-CM

## 2018-07-25 DIAGNOSIS — N39.0: ICD-10-CM

## 2018-07-25 DIAGNOSIS — M79.605: ICD-10-CM

## 2018-07-25 DIAGNOSIS — F17.200: ICD-10-CM

## 2018-07-25 DIAGNOSIS — D64.9: ICD-10-CM

## 2018-07-25 LAB
ALBUMIN SERPL-MCNC: 3.2 G/DL (ref 3.4–5)
ALP SERPL-CCNC: 67 U/L (ref 45–117)
ALT SERPL-CCNC: 27 U/L (ref 12–78)
ANION GAP SERPL CALC-SCNC: 10 MMOL/L (ref 5–15)
BASOPHILS # BLD AUTO: 0.1 X10^3/UL (ref 0–0.1)
BASOPHILS NFR BLD AUTO: 2 % (ref 0–1)
BILIRUB SERPL-MCNC: 0.3 MG/DL (ref 0.2–1)
CALCIUM SERPL-MCNC: 8.7 MG/DL (ref 8.5–10.1)
CHLORIDE SERPL-SCNC: 105 MMOL/L (ref 98–107)
CREAT SERPL-MCNC: 0.93 MG/DL (ref 0.7–1.3)
CULTURE INDICATED?: YES
EOSINOPHIL # BLD AUTO: 0.1 X10^3/UL (ref 0–0.4)
EOSINOPHIL NFR BLD AUTO: 1 % (ref 1–7)
ERYTHROCYTE [DISTWIDTH] IN BLOOD BY AUTOMATED COUNT: 18 % (ref 9.4–14.8)
INR PPP: 0.98 (ref 0.93–1.1)
LYMPHOCYTES # BLD AUTO: 1.81 X10^3/UL (ref 1–3.4)
LYMPHOCYTES NFR BLD AUTO: 26 % (ref 22–44)
MCH RBC QN AUTO: 27.1 PG (ref 27.5–34.5)
MCHC RBC AUTO-ENTMCNC: 33.1 G/DL (ref 33.2–36.2)
MCV RBC AUTO: 81.9 FL (ref 81–97)
MD: NO
MICROSCOPIC: (no result)
MONOCYTES # BLD AUTO: 0.65 X10^3/UL (ref 0.2–0.8)
MONOCYTES NFR BLD AUTO: 9 % (ref 2–9)
NEUTROPHILS # BLD AUTO: 4.26 X10^3/UL (ref 1.8–6.8)
NEUTROPHILS NFR BLD AUTO: 62 % (ref 42–75)
PLATELET # BLD AUTO: 779 X10^3/UL (ref 130–400)
PMV BLD AUTO: 7.2 FL (ref 7.4–10.4)
PROT SERPL-MCNC: 8.5 G/DL (ref 6.4–8.2)
PROTHROMBIN TIME: 10.2 SECONDS (ref 9.6–11.5)
RBC # BLD AUTO: 3.92 X10^6/UL (ref 4.38–5.82)

## 2018-07-25 PROCEDURE — 99285 EMERGENCY DEPT VISIT HI MDM: CPT

## 2018-07-25 PROCEDURE — 87040 BLOOD CULTURE FOR BACTERIA: CPT

## 2018-07-25 PROCEDURE — 80053 COMPREHEN METABOLIC PANEL: CPT

## 2018-07-25 PROCEDURE — 93005 ELECTROCARDIOGRAM TRACING: CPT

## 2018-07-25 PROCEDURE — 87086 URINE CULTURE/COLONY COUNT: CPT

## 2018-07-25 PROCEDURE — 85610 PROTHROMBIN TIME: CPT

## 2018-07-25 PROCEDURE — 81001 URINALYSIS AUTO W/SCOPE: CPT

## 2018-07-25 PROCEDURE — 83605 ASSAY OF LACTIC ACID: CPT

## 2018-07-25 PROCEDURE — 85025 COMPLETE CBC W/AUTO DIFF WBC: CPT

## 2018-07-25 PROCEDURE — 36415 COLL VENOUS BLD VENIPUNCTURE: CPT

## 2018-07-29 ENCOUNTER — HOSPITAL ENCOUNTER (EMERGENCY)
Dept: HOSPITAL 8 - ED | Age: 63
Discharge: HOME | End: 2018-07-29
Payer: MEDICAID

## 2018-07-29 VITALS — WEIGHT: 132.5 LBS | HEIGHT: 70 IN | BODY MASS INDEX: 18.97 KG/M2

## 2018-07-29 VITALS — DIASTOLIC BLOOD PRESSURE: 83 MMHG | SYSTOLIC BLOOD PRESSURE: 131 MMHG

## 2018-07-29 DIAGNOSIS — Z48.01: Primary | ICD-10-CM

## 2018-07-29 DIAGNOSIS — F17.200: ICD-10-CM
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
## 2018-07-29 DIAGNOSIS — S81.812D: ICD-10-CM

## 2018-07-29 LAB
ALBUMIN SERPL-MCNC: 3.2 G/DL (ref 3.4–5)
ANION GAP SERPL CALC-SCNC: 9 MMOL/L (ref 5–15)
BASOPHILS # BLD AUTO: 0.06 X10^3/UL (ref 0–0.1)
BASOPHILS NFR BLD AUTO: 1 % (ref 0–1)
CALCIUM SERPL-MCNC: 8.5 MG/DL (ref 8.5–10.1)
CHLORIDE SERPL-SCNC: 108 MMOL/L (ref 98–107)
CREAT SERPL-MCNC: 0.87 MG/DL (ref 0.7–1.3)
EOSINOPHIL # BLD AUTO: 0.05 X10^3/UL (ref 0–0.4)
EOSINOPHIL NFR BLD AUTO: 1 % (ref 1–7)
ERYTHROCYTE [DISTWIDTH] IN BLOOD BY AUTOMATED COUNT: 18.3 % (ref 9.4–14.8)
LYMPHOCYTES # BLD AUTO: 1.39 X10^3/UL (ref 1–3.4)
LYMPHOCYTES NFR BLD AUTO: 22 % (ref 22–44)
MCH RBC QN AUTO: 25.5 PG (ref 27.5–34.5)
MCHC RBC AUTO-ENTMCNC: 31.3 G/DL (ref 33.2–36.2)
MCV RBC AUTO: 81.3 FL (ref 81–97)
MD: NO
MONOCYTES # BLD AUTO: 0.59 X10^3/UL (ref 0.2–0.8)
MONOCYTES NFR BLD AUTO: 9 % (ref 2–9)
NEUTROPHILS # BLD AUTO: 4.4 X10^3/UL (ref 1.8–6.8)
NEUTROPHILS NFR BLD AUTO: 68 % (ref 42–75)
PLATELET # BLD AUTO: 756 X10^3/UL (ref 130–400)
PMV BLD AUTO: 6.6 FL (ref 7.4–10.4)
RBC # BLD AUTO: 4.08 X10^6/UL (ref 4.38–5.82)

## 2018-07-29 PROCEDURE — 36415 COLL VENOUS BLD VENIPUNCTURE: CPT

## 2018-07-29 PROCEDURE — 99285 EMERGENCY DEPT VISIT HI MDM: CPT

## 2018-07-29 PROCEDURE — 80048 BASIC METABOLIC PNL TOTAL CA: CPT

## 2018-07-29 PROCEDURE — 85025 COMPLETE CBC W/AUTO DIFF WBC: CPT

## 2018-07-29 PROCEDURE — 82040 ASSAY OF SERUM ALBUMIN: CPT

## 2018-07-30 ENCOUNTER — HOSPITAL ENCOUNTER (EMERGENCY)
Dept: HOSPITAL 8 - ED | Age: 63
Discharge: HOME | End: 2018-07-30
Payer: MEDICAID

## 2018-07-30 VITALS — DIASTOLIC BLOOD PRESSURE: 95 MMHG | SYSTOLIC BLOOD PRESSURE: 165 MMHG

## 2018-07-30 VITALS — BODY MASS INDEX: 20.2 KG/M2 | HEIGHT: 70 IN | WEIGHT: 141.1 LBS

## 2018-07-30 DIAGNOSIS — R33.8: ICD-10-CM

## 2018-07-30 DIAGNOSIS — N40.1: Primary | ICD-10-CM

## 2018-07-30 DIAGNOSIS — F10.20: ICD-10-CM

## 2018-07-30 LAB
CULTURE INDICATED?: NO
MICROSCOPIC: (no result)

## 2018-07-30 PROCEDURE — 99285 EMERGENCY DEPT VISIT HI MDM: CPT

## 2018-07-30 PROCEDURE — 51701 INSERT BLADDER CATHETER: CPT

## 2018-07-30 PROCEDURE — 81003 URINALYSIS AUTO W/O SCOPE: CPT

## 2018-08-03 ENCOUNTER — HOSPITAL ENCOUNTER (EMERGENCY)
Dept: HOSPITAL 8 - ED | Age: 63
Discharge: HOME | End: 2018-08-03
Payer: MEDICAID

## 2018-08-03 VITALS — BODY MASS INDEX: 20.2 KG/M2 | WEIGHT: 141.1 LBS | HEIGHT: 70 IN

## 2018-08-03 VITALS — SYSTOLIC BLOOD PRESSURE: 134 MMHG | DIASTOLIC BLOOD PRESSURE: 84 MMHG

## 2018-08-03 DIAGNOSIS — R33.9: Primary | ICD-10-CM

## 2018-08-03 LAB — MICROSCOPIC: (no result)

## 2018-08-03 PROCEDURE — 81001 URINALYSIS AUTO W/SCOPE: CPT

## 2018-08-03 PROCEDURE — 99284 EMERGENCY DEPT VISIT MOD MDM: CPT

## 2018-08-03 PROCEDURE — 51702 INSERT TEMP BLADDER CATH: CPT

## 2018-08-06 ENCOUNTER — HOSPITAL ENCOUNTER (EMERGENCY)
Facility: MEDICAL CENTER | Age: 63
End: 2018-08-06
Attending: EMERGENCY MEDICINE
Payer: MEDICAID

## 2018-08-06 VITALS
BODY MASS INDEX: 20.04 KG/M2 | HEIGHT: 70 IN | HEART RATE: 104 BPM | DIASTOLIC BLOOD PRESSURE: 87 MMHG | WEIGHT: 139.99 LBS | SYSTOLIC BLOOD PRESSURE: 138 MMHG | RESPIRATION RATE: 16 BRPM | OXYGEN SATURATION: 96 % | TEMPERATURE: 97.4 F

## 2018-08-06 DIAGNOSIS — T83.091A OBSTRUCTION OF FOLEY CATHETER, INITIAL ENCOUNTER (HCC): ICD-10-CM

## 2018-08-06 PROCEDURE — 99284 EMERGENCY DEPT VISIT MOD MDM: CPT

## 2018-08-06 PROCEDURE — 303105 HCHG CATHETER EXTRA

## 2018-08-06 PROCEDURE — 51702 INSERT TEMP BLADDER CATH: CPT

## 2018-08-06 PROCEDURE — 87086 URINE CULTURE/COLONY COUNT: CPT

## 2018-08-06 NOTE — ED PROVIDER NOTES
"ED Provider Note    Scribed for Xavier Davis M.D. by Luis Maldonado. 8/6/2018  12:50 PM    Primary care provider: Bharti Ramirez M.D.  Means of arrival: walk in  History obtained from: patient  History limited by: none    CHIEF COMPLAINT  Chief Complaint   Patient presents with   • Urinary Catheter Problem     Pt has a urinary catheter in place for about 2 days. States having lower abd discomfort and \"it's not draining\". Urine seen in bag and in tubing like it is backed up.        HPI  Bola Varela is a 62 y.o. male who presents to the Emergency Department for urinary catheter problem and complaining of abdominal discomfort for the last 2 days. He describes his pain as mild and diffuse. Patient states that he has had his urinary catheter in place for 2 days. Since then he has had minimal urine output with increasing abdominal pain. Patient denies fever.    REVIEW OF SYSTEMS  Pertinent positives include catheter problem, abdominal pain, urinary retention.   Pertinent negatives include no fever.    See HPI for further details. E.    PAST MEDICAL HISTORY   has a past medical history of Tobacco use.    SURGICAL HISTORY   has a past surgical history that includes ankle orif (Right).    SOCIAL HISTORY  Social History   Substance Use Topics   • Smoking status: Current Every Day Smoker     Packs/day: 0.10     Years: 40.00     Types: Cigarettes   • Smokeless tobacco: Never Used      Comment: 1 ppd until few years ago   • Alcohol use No      Comment: previous heavy alcohol use, none in 6 weeks      History   Drug Use No       FAMILY HISTORY  Family History   Problem Relation Age of Onset   • No Known Problems Mother    • No Known Problems Father    • Cancer Neg Hx    • Diabetes Neg Hx    • Heart Disease Neg Hx    • Stroke Neg Hx        CURRENT MEDICATIONS  Home Medications     Reviewed by Alicia Rinaldi R.N. (Registered Nurse) on 08/06/18 at 1042  Med List Status: Complete   Medication Last Dose Status    " "    Patient Berhane Taking any Medications                       ALLERGIES  No Known Allergies    PHYSICAL EXAM  VITAL SIGNS: /87   Pulse (!) 104   Temp 36.3 °C (97.4 °F) (Temporal)   Resp 16   Ht 1.778 m (5' 10\")   Wt 63.5 kg (139 lb 15.9 oz)   SpO2 96%   BMI 20.09 kg/m²     Nursing note and vitals reviewed.  Constitutional: Well-developed and well-nourished. No distress.   HENT: Head is normocephalic and atraumatic. Oropharynx is clear and moist without exudate or erythema.   Eyes: Pupils are equal, round, and reactive to light. Conjunctiva are normal.   Cardiovascular: Normal rate and regular rhythm. No murmur heard. Normal radial pulses.  Pulmonary/Chest: Breath sounds normal. No wheezes or rales.   Abdominal: Soft and non-tender. No distention    Musculoskeletal: Extremities exhibit normal range of motion without edema or tenderness.   Neurological: Awake, alert and oriented to person, place, and time. No focal deficits noted.  Skin: Skin is warm and dry. No rash.   Psychiatric: Normal mood and affect. Appropriate for clinical situation.    DIAGNOSTIC STUDIES / PROCEDURES    LABS  Labs Reviewed   URINE CULTURE(NEW)   All labs reviewed by me.    COURSE & MEDICAL DECISION MAKING  Nursing notes, VS, PMSFHx reviewed in chart.     12:50 PM - Patient seen and examined at bedside. Ordered Urine culture to evaluate his symptoms. He will have  Anew catheter placed while in the ED.     12:54 PM - Recheck: Patient re-evaluated at beside. Patient reports feeling greatly improved with new catheter. Discussed patient's condition and treatment plan. Patient will be discharged with instructions and provided with strict return precautions. Advised to follow up with his urologist. Instructed to return to Emergency Department immediately if any new or worsening symptoms, specifically if he develops fever.    Discharge vitals: /87   Pulse (!) 104   Temp 36.3 °C (97.4 °F) (Temporal)   Resp 16   Ht 1.778 m (5' " "10\")   Wt 63.5 kg (139 lb 15.9 oz)   SpO2 96%   BMI 20.09 kg/m²     Mcleod catheter was replaced with improved symptoms.  Patient's discharged home in improved condition.  Urology follow-up    The patient will return for new or worsening symptoms and is stable at the time of discharge.    The patient is referred to a primary physician for blood pressure management, diabetic screening, and for all other preventative health concerns.    DISPOSITION:  Patient will be discharged home in stable condition.    FOLLOW UP:  [facility], Emergency Dept  [address] [place] [address] [phone]        Ricardo Landry M.D.  [address] [place]  [place] [address] [phone]    Schedule an appointment as soon as possible for a visit  urology      OUTPATIENT MEDICATIONS:  New Prescriptions    No medications on file         FINAL IMPRESSION  1. Obstruction of Mcleod catheter, initial encounter ([facility])          ILuis (Scribe), am scribing for, and in the presence of, Xavier Davis M.D..    Electronically signed by: Luis Maldonado (Scribe), 8/6/2018    IXavier M.D. personally performed the services described in this documentation, as scribed by Luis Maldonado in my presence, and it is both accurate and complete.    The note accurately reflects work and decisions made by me.  Xavier Davis  8/6/2018  3:48 PM  "

## 2018-08-06 NOTE — DISCHARGE INSTRUCTIONS
Mcleod Catheter Care, Adult  A Mcleod catheter is a soft, flexible tube that is placed into the bladder to drain urine. A Mcleod catheter may be inserted if:  · You leak urine or are not able to control when you urinate (urinary incontinence).  · You are not able to urinate when you need to (urinary retention).  · You had prostate surgery or surgery on the genitals.  · You have certain medical conditions, such as multiple sclerosis, dementia, or a spinal cord injury.  If you are going home with a Mcleod catheter in place, follow the instructions below.  TAKING CARE OF THE CATHETER  1. Wash your hands with soap and water.  2. Using mild soap and warm water on a clean washcloth:  ¨ Clean the area on your body closest to the catheter insertion site using a circular motion, moving away from the catheter. Never wipe toward the catheter because this could sweep bacteria up into the urethra and cause infection.  ¨ Remove all traces of soap. Pat the area dry with a clean towel. For males, reposition the foreskin.  3. Attach the catheter to your leg so there is no tension on the catheter. Use adhesive tape or a leg strap. If you are using adhesive tape, remove any sticky residue left behind by the previous tape you used.  4. Keep the drainage bag below the level of the bladder, but keep it off the floor.  5. Check throughout the day to be sure the catheter is working and urine is draining freely. Make sure the tubing does not become kinked.  6. Do not pull on the catheter or try to remove it. Pulling could damage internal tissues.  TAKING CARE OF THE DRAINAGE BAGS  You will be given two drainage bags to take home. One is a large overnight drainage bag, and the other is a smaller leg bag that fits underneath clothing. You may wear the overnight bag at any time, but you should never wear the smaller leg bag at night. Follow the instructions below for how to empty, change, and clean your drainage bags.  Emptying the Drainage  Bag  You must empty your drainage bag when it is  -½ full or at least 2-3 times a day.  1. Wash your hands with soap and water.  2. Keep the drainage bag below your hips, below the level of your bladder. This stops urine from going back into the tubing and into your bladder.  3. Hold the dirty bag over the toilet or a clean container.  4. Open the pour spout at the bottom of the bag and empty the urine into the toilet or container. Do not let the pour spout touch the toilet, container, or any other surface. Doing so can place bacteria on the bag, which can cause an infection.  5. Clean the pour spout with a gauze pad or cotton ball that has rubbing alcohol on it.  6. Close the pour spout.  7. Attach the bag to your leg with adhesive tape or a leg strap.  8. Wash your hands well.  Changing the Drainage Bag  Change your drainage bag once a month or sooner if it starts to smell bad or look dirty. Below are steps to follow when changing the drainage bag.  1. Wash your hands with soap and water.  2. Pinch off the rubber catheter so that urine does not spill out.  3. Disconnect the catheter tube from the drainage tube at the connection valve. Do not let the tubes touch any surface.  4. Clean the end of the catheter tube with an alcohol wipe. Use a different alcohol wipe to clean the end of the drainage tube.  5. Connect the catheter tube to the drainage tube of the clean drainage bag.  6. Attach the new bag to the leg with adhesive tape or a leg strap. Avoid attaching the new bag too tightly.  7. Wash your hands well.  Cleaning the Drainage Bag  1. Wash your hands with soap and water.  2. Wash the bag in warm, soapy water.  3. Rinse the bag thoroughly with warm water.  4. Fill the bag with a solution of white vinegar and water (1 cup vinegar to 1 qt warm water [.2 L vinegar to 1 L warm water]). Close the bag and soak it for 30 minutes in the solution.  5. Rinse the bag with warm water.  6. Hang the bag to dry with the  pour spout open and hanging downward.  7. Store the clean bag (once it is dry) in a clean plastic bag.  8. Wash your hands well.  PREVENTING INFECTION  · Wash your hands before and after handling your catheter.  · Take showers daily and wash the area where the catheter enters your body. Do not take baths. Replace wet leg straps with dry ones, if this applies.  · Do not use powders, sprays, or lotions on the genital area. Only use creams, lotions, or ointments as directed by your caregiver.  · For females, wipe from front to back after each bowel movement.  · Drink enough fluids to keep your urine clear or pale yellow unless you have a fluid restriction.  · Do not let the drainage bag or tubing touch or lie on the floor.  · Wear cotton underwear to absorb moisture and to keep your .  SEEK MEDICAL CARE IF:   · Your urine is cloudy or smells unusually bad.  · Your catheter becomes clogged.  · You are not draining urine into the bag or your bladder feels full.  · Your catheter starts to leak.  SEEK IMMEDIATE MEDICAL CARE IF:   · You have pain, swelling, redness, or pus where the catheter enters the body.  · You have pain in the abdomen, legs, lower back, or bladder.  · You have a fever.  · You see blood fill the catheter, or your urine is pink or red.  · You have nausea, vomiting, or chills.  · Your catheter gets pulled out.  MAKE SURE YOU:   · Understand these instructions.  · Will watch your condition.  · Will get help right away if you are not doing well or get worse.     This information is not intended to replace advice given to you by your health care provider. Make sure you discuss any questions you have with your health care provider.     Document Released: 12/18/2006 Document Revised: 05/04/2015 Document Reviewed: 12/09/2013  [employer] Patient Education ©2016 [employer]

## 2018-08-06 NOTE — ED NOTES
Patient catheter changed and there was approximately 125ml of urine upon catheter insertion.   
Patient verbalized understanding of dc and followup, no acute distress noted, patient stable for discharge  
I have personally seen and examined this patient.  I have fully participated in the care of this patient. I have reviewed all pertinent clinical information, including history, physical exam, plan and the Resident’s note and agree except as noted.

## 2018-08-06 NOTE — ED TRIAGE NOTES
"Chief Complaint   Patient presents with   • Urinary Catheter Problem     Pt has a urinary catheter in place for about 2 days. States having lower abd discomfort and \"it's not draining\". Urine seen in bag and in tubing like it is backed up.      Pt thinks he lost a part.   /87   Pulse (!) 104   Temp 36.3 °C (97.4 °F) (Temporal)   Resp 16   Ht 1.778 m (5' 10\")   Wt 63.5 kg (139 lb 15.9 oz)   SpO2 96%   BMI 20.09 kg/m²   Pt placed back in lobby, educated on triage process, and told to inform staff of any change in condition.     "

## 2018-08-08 LAB
BACTERIA UR CULT: NORMAL
SIGNIFICANT IND 70042: NORMAL
SITE SITE: NORMAL
SOURCE SOURCE: NORMAL

## 2018-08-09 ENCOUNTER — NON-PROVIDER VISIT (OUTPATIENT)
Dept: WOUND CARE | Facility: MEDICAL CENTER | Age: 63
End: 2018-08-09
Attending: NURSE PRACTITIONER
Payer: MEDICAID

## 2018-08-09 DIAGNOSIS — S81.802A OPEN WOUND OF LEFT LOWER LEG, INITIAL ENCOUNTER: Primary | ICD-10-CM

## 2018-08-09 DIAGNOSIS — Z72.0 TOBACCO USE: ICD-10-CM

## 2018-08-09 DIAGNOSIS — R41.89 COGNITIVE IMPAIRMENT: ICD-10-CM

## 2018-08-09 DIAGNOSIS — M24.562 FLEXION CONTRACTURE OF KNEE, LEFT: ICD-10-CM

## 2018-08-09 PROCEDURE — 97597 DBRDMT OPN WND 1ST 20 CM/<: CPT | Performed by: NURSE PRACTITIONER

## 2018-08-09 PROCEDURE — 97597 DBRDMT OPN WND 1ST 20 CM/<: CPT

## 2018-08-09 PROCEDURE — 97598 DBRDMT OPN WND ADDL 20CM/<: CPT | Performed by: NURSE PRACTITIONER

## 2018-08-09 PROCEDURE — 97598 DBRDMT OPN WND ADDL 20CM/<: CPT

## 2018-08-09 RX ORDER — TAMSULOSIN HYDROCHLORIDE 0.4 MG/1
0.4 CAPSULE ORAL
COMMUNITY
End: 2018-08-22

## 2018-08-09 RX ORDER — GABAPENTIN 100 MG/1
100 CAPSULE ORAL 3 TIMES DAILY
COMMUNITY
End: 2018-08-22

## 2018-08-09 RX ORDER — TRAZODONE HYDROCHLORIDE 50 MG/1
50 TABLET ORAL NIGHTLY
COMMUNITY
End: 2018-08-22

## 2018-08-09 NOTE — CERTIFICATION
"Advanced Wound Care  [facility]  [address]  [address]  [place] [address]  [phone] Fax: [phone]      Initial Evaluation  For Certification Period: 08/09/2018 - 10/30/2018  Start of Care: 08/09/2018          Referring Physician: Ronda Dewitt  Primary Physician: Bharti Ramirez M.D.        Consulting Physicians: RAFITA Medellin     Wound(s): LLE  Pharmacy of Choice:        Subjective:        HPI:            Pt who is currently residing in [facility] housing presents with  Anabell. Pt states that \"a few months ago\" he was hiking along a trail, when \"the trail fell out from under me, and I went toppling down the grade hitting every bush and branch along the way.\" Pt states he \"just dealt with it\" until he was recently admitted for unrelated issues. While at [facility], pt has been applying Telfa pads and wrapping in coban.    Pain:      Pt reports 6/10 pain presently    Past Medical History:  Past Medical History:   Diagnosis Date   • Tobacco use        Current Medications:No current outpatient prescriptions on file.    Allergies: No Known Allergies    Past Surgical History:   Past Surgical History:   Procedure Laterality Date   • ANKLE ORIF Right        Social History:   Social History     Social History   • Marital status: Single     Spouse name: N/A   • Number of children: N/A   • Years of education: N/A     Occupational History   • Not on file.     Social History Main Topics   • Smoking status: Current Every Day Smoker     Packs/day: 0.10     Years: 40.00     Types: Cigarettes   • Smokeless tobacco: Never Used      Comment: 1 ppd until few years ago   • Alcohol use No      Comment: previous heavy alcohol use, none in 6 weeks   • Drug use: No   • Sexual activity: Not Currently     Partners: Female     Other Topics Concern   • Not on file     Social History Narrative   • No narrative on file             Objective:      Tests and Measures:   08/09/2018 - KOSTAS performed " by Kvng CARMEN RN     Left    - Strong, multiphasic per doppler, irregular rhythm    - Strong, biphasic per doppler   Brachial 130   KOSTAS 1.09         Orthotic, protective, supportive devices: none    Fall Risk Assessment (franklin all that apply with an X):  Completed at initial evaluation on 08/09/2018   65 years or older     X Fall within the last 2 years, uses   Ambulatory devices  Loss of protective sensation in feet,   Use of prostethic/orthotic, years    Presence of lower extremity/foot/toe amputation   Taking medication that increases risk (per facility policy)    Interventions Recommended (if any of the above are selected):   Use of Assistive Device:________________________   Supervision with ambulation:    Assistance with ambulation:    Home safety education:  Educational material provided         Wound Characteristics                                                    Location: [facility]   Initial Evaluation  Date: 08/09/2018     Tissue Type and %: 50% moist red, 50% thick dry yellow/brown   Periwound: Scar tissue, intact, small localized area of edema to posterior aspect of LE between 2 branches of wound bed   Drainage: Minimal serosanguinous   Exposed structures None noted   Wound Edges:   Thick, crusted   Odor: None   S&S of Infection:   None   Edema: Local to wound,    Sensation: Intact               Measurements: E Initial Evaluation  Date: 08/09/2018     Length (cm) 23   Width (cm) 10.5   Depth (cm) 0.2   Area (cm2) 241.5 cm2   Tract/undermine None        Procedures:     Debridement :  This RN debrided ~100 cm2 of biofilm from wound bed and ~ 20 cm2 of thick crusted wound edges from wound using scalpel and forceps Dry area crosshatched with scalpel. RAFITA Medellin debrided thick crusted edges. Please see separate note   Cleansed with:   NSS                                                                       Periwound protected with: Skin prep   Primary dressing: Medihoney colloid to thick  yellow crusted areas, Aquacel Ag to other areas   Secondary Dressin ABD pads secured with kerlix and tape   Other: Thigh high Tubi F.      Patient Education: Discussed POC with patient and . Discussed need for further CSWD to remove dried crust and thick, crusted edges. Discussed rationale for possibly placing wound vac. Pt's  asked about a referral to SNF. Per RAFITA Medellin, that referral needs to come from a primary care provider. Instructed pt on s/s infection - chills, fever, malaise, NV, increased redness/swelling/pain/exudate - and to go to ER/Urgent Care; to keep dressing D/I, and to return to [facility] 2x/week for wound care. Pt added to provider schedule next week for excision of crusted edges and deeper debridement. Pt and  agreeable to POC and verbalize understanding of all instruction.     Professional Collaboration: RAFITA Medellin to debride some edges, Initial evaluation sent to referring provider via Epic, [facility] for wound vac order.       Assessment:      Wound etiology: Trauma    Wound Progress:  Initial Evaluation    Rationale for Treatment: Honey colloid to, maintain moist wound bed, to lower pH for wound healing and to facilitate autolytic debridement. AqAg to manage bioburden, absorb exudate, and maintain moist wound environment without laterally wicking exudate therefore reducing addison-wound maceration. ABD pads to absorb excess drainage.     Patient tolerance/compliance: Pt tolerated treatment well. Questionable compliance.     Complicating factors: Possible non compliance.     Need for ongoing Advanced Wound Care services:Patient requires skilled therapeutic wound care services for product selection, application of product, debridement, close monitoring with clinical assessment for expedite of wound healing.         Plan:      Treatment Plan and Recommendations:  Diagnosis/ICD10:   S81.809D (ICD-10-CM) - Unspecified open wound, unspecified lower leg,  subsequent encounter   S71.132 (ICD-10-CM) - Puncture wound without foreign body, left thigh   M79.605 (ICD-10-CM) - Pain in left leg       Procedures/CPT: CSWD <20 cm - 45224; CSWD >20 cm - 68830    Frequency: 2x/week - 60 minutes initially, 3x/week with wound vac placement.       Treatment Goals: STG 2 Weeks  LTG 4 Weeks   Granulation Tissue: 75% 100%   Decrease Necrotic Tissue to: 25% 0%   Wound Phase:  Proliferation Proliferation   Decrease Size by: 30% 60%   Periwound:  Intact Intact   Decrease tracts/undermining by: NA NA   Decrease Pain:  4/10 3/10       At the time of each visit a thorough assessment of the patient is completed to assure the  appropriateness of our plan of care.  The dressings or modalities may need to be adapted   from the original plan to address any significant changes in the wound environment.          Clinician Signature:_______________________________Date__________________      Physician Signature:______________________________Date:__________________

## 2018-08-16 ENCOUNTER — OFFICE VISIT (OUTPATIENT)
Dept: WOUND CARE | Facility: MEDICAL CENTER | Age: 63
End: 2018-08-16
Attending: NURSE PRACTITIONER
Payer: MEDICAID

## 2018-08-16 DIAGNOSIS — S81.802A OPEN LEG WOUND, LEFT, INITIAL ENCOUNTER: ICD-10-CM

## 2018-08-16 DIAGNOSIS — Z72.0 TOBACCO USE: ICD-10-CM

## 2018-08-16 DIAGNOSIS — R41.3 MEMORY LOSS: ICD-10-CM

## 2018-08-16 PROCEDURE — 11042 DBRDMT SUBQ TIS 1ST 20SQCM/<: CPT

## 2018-08-16 PROCEDURE — 11042 DBRDMT SUBQ TIS 1ST 20SQCM/<: CPT | Performed by: NURSE PRACTITIONER

## 2018-08-16 PROCEDURE — 99406 BEHAV CHNG SMOKING 3-10 MIN: CPT | Mod: 25 | Performed by: NURSE PRACTITIONER

## 2018-08-16 PROCEDURE — 11045 DBRDMT SUBQ TISS EACH ADDL: CPT

## 2018-08-16 PROCEDURE — 11045 DBRDMT SUBQ TISS EACH ADDL: CPT | Performed by: NURSE PRACTITIONER

## 2018-08-16 NOTE — PROGRESS NOTES
"PROCEDURE NOTE    Patient seen in collaboration with wound care provider,  Kiana Seth RN for LLE wound extending from thigh to medial calf      HPI:  61 y/o male with memory loss, tobacco abuse, hx of homelessness currently staying at housing through [facility]. Pt is accompanied by  Kelsie with [facility]. Pt recalls he fell down a hiking trail in May 2018 and caught a tree root causing a severe laceration to his left leg. He applied antibacterial ointment, left it open to air and \"toughed it out\" he stated. He presented to [facility] ED in 7/16/18 for heat exhaustion. He then later was admitted at [facility] for flu like symptoms mid July for about 1 wk. He was connected with [facility]. Pt returned to ED 8/6 for urinary retention and currently is wearing a leg bag. He has a f/u appt with urology at the end of August 2018.     Today pt is seen by me for excisional debridement of extensive leg wound that spans from mid posterior thigh to mid medial calf.  Wound VAC therapy was ordered at last visit to assist with wound healing and is set to be delivered 8/20 to [facility]. Kelsie is concerned with pt's compliance to VAC therapy as pt has been unable to maintain current wound dressing from last wound appt. A SNF referral was placed by PCP to [facility], however the facility has not accepted the pt because they do not know the details regarding the wound. Today there are several colored hairs of various lengths in the wound. He has thick crusts semi-callused to wound edges. There is also a thin slough layer covering the entire wound. New epithelization is noted to lateral edges of the wound showing he has been able to heal since initial injury in May.          The procedure, rationale for doing so, and the possible risks- including infection, pain and bleeding- have been discussed with the patient.  The patient  verbalized understanding and is agreeable with proceeding.      DESCRIPTION OF PROCEDURE:   " excisional debridement of skin and senescent red tissue to LLE wound     PMH, medications and recent labs reviewed    ANESTHESIA:  viscous 2% topical lidocaine applied to LLE wound and allowed to dwell for ~5 minutes    PROCEDURE:  Scalpel, scissors and forceps used to excise slough and senescent red tissue from the wound bed and thick periwound callus to medial lower aspect of ulcer.  Total area debrided, ~150cm2.  Debridement carried into the subcutaneous tissue layer. Bleeding controlled with manual pressure.  Wound care completed by Kiana.  Patient tolerated the procedure well, with little discomfort. Pt unable to tolerate debridement to posterior thigh part of wound. May require injectable lidocaine.         Wound Characteristics                                                    Location:   LLE Initial Evaluation  Date: 08/09/2018 Encounter Date:  08/16/2018   Tissue Type and %: 50% moist red, 50% thick dry yellow/brown 70% moist red; 30% marbled yellow/red   Periwound: Scar tissue, intact, small localized area of edema to posterior aspect of LE between 2 branches of wound bed Scar tissue, small localized area of edema to posterior aspect of LE   Drainage: Minimal serosanguinous Minimal ss   Exposed structures None noted None   Wound Edges:   Thick, crusted Thin crusted on medial edge; thick crusted on posterolateral edge   Odor: None None   S&S of Infection:   None None   Edema: Local to wound,  Local   Sensation: Intact Intact               Measurements:   LLE Initial Evaluation  Date: 08/09/2018 Encounter Date  08/16/2018   Length (cm) 23 24.5   Width (cm) 10.5 13   Depth (cm) 0.2 0.2   Area (cm2) 241.5 cm2 318.5 cm2         ASSESSMENT/PLAN:        ICD-10-CM   1. Open leg wound, left, initial encounter: initial injury May 2018. Started wound care at [facility] 8/9/18. Wound VAC therapy ordered, arrives 8/20. currently at [facility]  8/16: excisional debridement of lower medial leg wound only d/t time  constraint and pt inability to tolerate debridement to posterior thigh. Will plan for injectable lidocaine at next visit or allow topical lidocaine to dwell longer to debride thigh area.   hydrofera blue dressing applied for antimicrobial benefits and exudate management; secured with absorbant pads and hypafix tape. If pt is able to maintain dressing, will consider VAC therapy placement on Monday. Pt will be better suited for  with VAC therapy to accelerate wound healing in a more controlled supervised environment.  has not accepted pt yet as they are awaiting further detail of wound. Will contact [facility] medical director.   Patient to continue to be seen in wound clinic 1-2 times per week for debridement and assessment  -Pt advised to go to ER for any increased redness, swelling, drainage or odor, or if he develops fever, chills, nausea or vomiting S81.802A   2. Memory loss: history of heavy alcohol use and drug use. Reminded several times to not shower but sponge bathe and to not remove dressing that was placed today.  R41.3   3.      Tobacco use: encouraged cessation to improve wound healing. Pt also smokes marijuana           Daily. Pt counseled on risks of tobacco and marijuana use for 4 minutes.

## 2018-08-17 NOTE — WOUND TEAM
"Advanced Wound Care  [facility]  [address]  [address]  [place] [address]  [phone] Fax: [phone]    Encounter Note  For Certification Period: 08/09/2018 - 10/30/2018  Start of Care: 08/09/2018    Referring Physician: Ronda Dewitt  Primary Physician: Bharti Ramirez M.D.        Consulting Physicians: RAFITA Medellin     Wound(s): LLE       Subjective:        HPI: Pt who is currently residing in [facility] housing presents with  Anabell. Pt states that \"a few months ago\" he was hiking along a trail, when \"the trail fell out from under me, and I went toppling down the grade hitting every bush and branch along the way.\" Pt states he \"just dealt with it\" until he was recently admitted for unrelated issues. While at [facility], pt has been applying Telfa pads and wrapping in coban.    Pain: Pt reports 4/10 pain presently; viscous lidocaine applied prior to provider debridement.     Past Medical History:  Past Medical History:   Diagnosis Date   • Restless leg    • Tobacco use      Current Medications:  Current Outpatient Prescriptions:   •  traZODone (DESYREL) 50 MG Tab, Take 50 mg by mouth every evening., Disp: , Rfl:   •  gabapentin (NEURONTIN) 100 MG Cap, Take 100 mg by mouth 3 times a day., Disp: , Rfl:   •  tamsulosin (FLOMAX) 0.4 MG capsule, Take 0.4 mg by mouth ONE-HALF HOUR AFTER BREAKFAST., Disp: , Rfl:     Allergies: No Known Allergies     Objective:      Tests and Measures:   08/09/2018 - KOSTAS performed by Kvng CARMEN RN     Left    - Strong, multiphasic per doppler, irregular rhythm    - Strong, biphasic per doppler   Brachial 130   KOSTAS 1.09       Orthotic, protective, supportive devices: none    Fall Risk Assessment (franklin all that apply with an X):  Completed at initial evaluation on 08/09/2018   65 years or older     X Fall within the last 2 years, uses   Ambulatory devices  Loss of protective sensation in feet,   Use of prostethic/orthotic, years "    Presence of lower extremity/foot/toe amputation   Taking medication that increases risk (per facility policy)     Wound Characteristics                                                    Location:   LLE Initial Evaluation  Date: 2018 Encounter Date:  2018   Tissue Type and %: 50% moist red, 50% thick dry yellow/brown 70% moist red; 30% marbled yellow/red   Periwound: Scar tissue, intact, small localized area of edema to posterior aspect of LE between 2 branches of wound bed Scar tissue, small localized area of edema to posterior aspect of LE   Drainage: Minimal serosanguinous Minimal ss   Exposed structures None noted None   Wound Edges:   Thick, crusted Thin crusted on medial edge; thick crusted on posterolateral edge   Odor: None None   S&S of Infection:   None None   Edema: Local to wound,  Local   Sensation: Intact Intact               Measurements:   LLE Initial Evaluation  Date: 2018 Encounter Date;  2018   Length (cm) 23 24.5   Width (cm) 10.5 13   Depth (cm) 0.2 0.2   Area (cm2) 241.5 cm2 318.5 cm2   Tract/undermine None None     18: Please have patient lie on his stomach for Monday's appt for better access to the posterior side of his leg and soaked in lidocaine.       Procedures:     Debridement: Excisional debridement of wound edge of medial edges of wound and partially of posterolateral wound edges and removal of thick biofilm by Asaf ELLER - please see her note. ~150 cm2 debrided today   Cleansed with: No rinse foam cleanser; NS                                                                       Periwound protected with: Skin prep, zinc barrier paste   Primary dressing: HFB to all wound areas   Secondary Dressin super absorb with edges trimmed secured with hypafix and wrapped with kerlix and secured with hypafix tape     Patient Education: Patient very forgetful during the appointment and APRN and RN had to continuously educate about keeping the dressing on  "until Monday, keeping dressing dry and intact which includes not showering but taking sponge baths. Patient states \"I will not touch dressing\". Discussed with patient that the APRN will debride the posterior side of his leg and the wound edges. Patient nods his head and states \"do what you have to do\".      08/09/2018: Discussed POC with patient and . Discussed need for further CSWD to remove dried crust and thick, crusted edges. Discussed rationale for possibly placing wound vac. Pt's  asked about a referral to SNF. Per RAFITA Medellin, that referral needs to come from a primary care provider. Instructed pt on s/s infection - chills, fever, malaise, NV, increased redness/swelling/pain/exudate - and to go to ER/Urgent Care; to keep dressing D/I, and to return to [facility] 2x/week for wound care. Pt added to provider schedule next week for excision of crusted edges and deeper debridement. Pt and  agreeable to POC and verbalize understanding of all instruction.     Professional Collaboration: RAFITA Hernandez to debride medial edges and wound bed. He is scheduled with provider on Monday for additional debridement and removal of wound edges on posterior thigh.       Assessment:      Wound etiology: Trauma    Wound Progress:  Increased viable tissue; approximately 60% of wound edges have been removed.     Rationale for Treatment: HFB for antimicrobial and to absorb drainage; super absorb pads to absorb excess drainage; kerlix to keep in place.      Patient tolerance/compliance: Pt tolerated treatment well; patient non compliant with keeping dressings on; patient very forgetful during appointment and needing constant reminders and redirection.     Complicating factors: Possible non compliance.     Need for ongoing Advanced Wound Care services:Patient requires skilled therapeutic wound care services for product selection, application of product, debridement, close monitoring with " clinical assessment for expedite of wound healing.     Plan:      Treatment Plan and Recommendations:  Diagnosis/ICD10:   S81.809D (ICD-10-CM) - Unspecified open wound, unspecified lower leg, subsequent encounter   S71.132 (ICD-10-CM) - Puncture wound without foreign body, left thigh   M79.605 (ICD-10-CM) - Pain in left leg       Procedures/CPT: CSWD <20 cm - 48318; CSWD >20 cm - 16165    Frequency: 2x/week - 60 minutes initially, 3x/week with wound vac placement.       Treatment Goals: STG 2 Weeks  LTG 4 Weeks   Granulation Tissue: 75% 100%   Decrease Necrotic Tissue to: 25% 0%   Wound Phase:  Proliferation Proliferation   Decrease Size by: 30% 60%   Periwound:  Intact Intact   Decrease tracts/undermining by: NA NA   Decrease Pain:  4/10 3/10       At the time of each visit a thorough assessment of the patient is completed to assure the  appropriateness of our plan of care.  The dressings or modalities may need to be adapted   from the original plan to address any significant changes in the wound environment.          Clinician Signature:_______________________________Date__________________      Physician Signature:______________________________Date:__________________

## 2018-08-19 ENCOUNTER — HOSPITAL ENCOUNTER (EMERGENCY)
Dept: HOSPITAL 8 - ED | Age: 63
Discharge: HOME | End: 2018-08-19
Payer: MEDICAID

## 2018-08-19 VITALS — SYSTOLIC BLOOD PRESSURE: 134 MMHG | DIASTOLIC BLOOD PRESSURE: 71 MMHG

## 2018-08-19 VITALS — BODY MASS INDEX: 21.48 KG/M2 | HEIGHT: 70 IN | WEIGHT: 150.02 LBS

## 2018-08-19 DIAGNOSIS — L03.116: Primary | ICD-10-CM

## 2018-08-19 DIAGNOSIS — F17.200: ICD-10-CM

## 2018-08-19 LAB
ALBUMIN SERPL-MCNC: 3.2 G/DL (ref 3.4–5)
ALP SERPL-CCNC: 123 U/L (ref 45–117)
ALT SERPL-CCNC: 44 U/L (ref 12–78)
ANION GAP SERPL CALC-SCNC: 10 MMOL/L (ref 5–15)
BASOPHILS # BLD AUTO: 0.09 X10^3/UL (ref 0–0.1)
BASOPHILS NFR BLD AUTO: 2 % (ref 0–1)
BILIRUB SERPL-MCNC: 0.3 MG/DL (ref 0.2–1)
CALCIUM SERPL-MCNC: 8.2 MG/DL (ref 8.5–10.1)
CHLORIDE SERPL-SCNC: 110 MMOL/L (ref 98–107)
CREAT SERPL-MCNC: 0.88 MG/DL (ref 0.7–1.3)
EOSINOPHIL # BLD AUTO: 0.09 X10^3/UL (ref 0–0.4)
EOSINOPHIL NFR BLD AUTO: 1 % (ref 1–7)
ERYTHROCYTE [DISTWIDTH] IN BLOOD BY AUTOMATED COUNT: 17.8 % (ref 9.4–14.8)
LYMPHOCYTES # BLD AUTO: 1.63 X10^3/UL (ref 1–3.4)
LYMPHOCYTES NFR BLD AUTO: 27 % (ref 22–44)
MCH RBC QN AUTO: 26.1 PG (ref 27.5–34.5)
MCHC RBC AUTO-ENTMCNC: 32.8 G/DL (ref 33.2–36.2)
MCV RBC AUTO: 79.4 FL (ref 81–97)
MD: NO
MONOCYTES # BLD AUTO: 0.85 X10^3/UL (ref 0.2–0.8)
MONOCYTES NFR BLD AUTO: 14 % (ref 2–9)
NEUTROPHILS # BLD AUTO: 3.45 X10^3/UL (ref 1.8–6.8)
NEUTROPHILS NFR BLD AUTO: 56 % (ref 42–75)
PLATELET # BLD AUTO: 528 X10^3/UL (ref 130–400)
PMV BLD AUTO: 7.1 FL (ref 7.4–10.4)
PROT SERPL-MCNC: 7.8 G/DL (ref 6.4–8.2)
RBC # BLD AUTO: 4.23 X10^6/UL (ref 4.38–5.82)

## 2018-08-19 PROCEDURE — 80307 DRUG TEST PRSMV CHEM ANLYZR: CPT

## 2018-08-19 PROCEDURE — 87040 BLOOD CULTURE FOR BACTERIA: CPT

## 2018-08-19 PROCEDURE — 36415 COLL VENOUS BLD VENIPUNCTURE: CPT

## 2018-08-19 PROCEDURE — 85025 COMPLETE CBC W/AUTO DIFF WBC: CPT

## 2018-08-19 PROCEDURE — 84145 PROCALCITONIN (PCT): CPT

## 2018-08-19 PROCEDURE — 99284 EMERGENCY DEPT VISIT MOD MDM: CPT

## 2018-08-19 PROCEDURE — 96365 THER/PROPH/DIAG IV INF INIT: CPT

## 2018-08-19 PROCEDURE — 83605 ASSAY OF LACTIC ACID: CPT

## 2018-08-19 PROCEDURE — 80053 COMPREHEN METABOLIC PANEL: CPT

## 2018-08-22 ENCOUNTER — HOSPITAL ENCOUNTER (INPATIENT)
Facility: MEDICAL CENTER | Age: 63
LOS: 6 days | DRG: 699 | End: 2018-08-28
Attending: EMERGENCY MEDICINE | Admitting: HOSPITALIST
Payer: MEDICAID

## 2018-08-22 ENCOUNTER — APPOINTMENT (OUTPATIENT)
Dept: RADIOLOGY | Facility: MEDICAL CENTER | Age: 63
DRG: 699 | End: 2018-08-22
Attending: HOSPITALIST
Payer: MEDICAID

## 2018-08-22 ENCOUNTER — APPOINTMENT (OUTPATIENT)
Dept: WOUND CARE | Facility: MEDICAL CENTER | Age: 63
End: 2018-08-22
Attending: NURSE PRACTITIONER
Payer: MEDICAID

## 2018-08-22 DIAGNOSIS — L03.116 LEFT LEG CELLULITIS: ICD-10-CM

## 2018-08-22 DIAGNOSIS — T83.511A URINARY TRACT INFECTION ASSOCIATED WITH INDWELLING URETHRAL CATHETER, INITIAL ENCOUNTER (HCC): ICD-10-CM

## 2018-08-22 DIAGNOSIS — N39.0 URINARY TRACT INFECTION ASSOCIATED WITH INDWELLING URETHRAL CATHETER, INITIAL ENCOUNTER (HCC): ICD-10-CM

## 2018-08-22 DIAGNOSIS — S81.802A OPEN WOUND OF LEFT LOWER LEG, INITIAL ENCOUNTER: ICD-10-CM

## 2018-08-22 DIAGNOSIS — R41.0 CONFUSION: ICD-10-CM

## 2018-08-22 PROBLEM — D50.9 MICROCYTIC ANEMIA: Status: ACTIVE | Noted: 2018-08-22

## 2018-08-22 LAB
ALBUMIN SERPL BCP-MCNC: 3.7 G/DL (ref 3.2–4.9)
ALBUMIN/GLOB SERPL: 1.1 G/DL
ALP SERPL-CCNC: 96 U/L (ref 30–99)
ALT SERPL-CCNC: 32 U/L (ref 2–50)
ANION GAP SERPL CALC-SCNC: 10 MMOL/L (ref 0–11.9)
APPEARANCE UR: ABNORMAL
AST SERPL-CCNC: 36 U/L (ref 12–45)
BACTERIA #/AREA URNS HPF: NEGATIVE /HPF
BASOPHILS # BLD AUTO: 0.7 % (ref 0–1.8)
BASOPHILS # BLD: 0.05 K/UL (ref 0–0.12)
BILIRUB SERPL-MCNC: 0.4 MG/DL (ref 0.1–1.5)
BILIRUB UR QL STRIP.AUTO: NEGATIVE
BUN SERPL-MCNC: 20 MG/DL (ref 8–22)
CALCIUM SERPL-MCNC: 9.3 MG/DL (ref 8.5–10.5)
CHLORIDE SERPL-SCNC: 104 MMOL/L (ref 96–112)
CO2 SERPL-SCNC: 26 MMOL/L (ref 20–33)
COLOR UR: YELLOW
CREAT SERPL-MCNC: 0.79 MG/DL (ref 0.5–1.4)
EOSINOPHIL # BLD AUTO: 0.03 K/UL (ref 0–0.51)
EOSINOPHIL NFR BLD: 0.4 % (ref 0–6.9)
EPI CELLS #/AREA URNS HPF: NEGATIVE /HPF
ERYTHROCYTE [DISTWIDTH] IN BLOOD BY AUTOMATED COUNT: 48 FL (ref 35.9–50)
GLOBULIN SER CALC-MCNC: 3.3 G/DL (ref 1.9–3.5)
GLUCOSE SERPL-MCNC: 102 MG/DL (ref 65–99)
GLUCOSE UR STRIP.AUTO-MCNC: NEGATIVE MG/DL
HCT VFR BLD AUTO: 29.5 % (ref 42–52)
HGB BLD-MCNC: 9.4 G/DL (ref 14–18)
HYALINE CASTS #/AREA URNS LPF: ABNORMAL /LPF
IMM GRANULOCYTES # BLD AUTO: 0.02 K/UL (ref 0–0.11)
IMM GRANULOCYTES NFR BLD AUTO: 0.3 % (ref 0–0.9)
KETONES UR STRIP.AUTO-MCNC: NEGATIVE MG/DL
LACTATE BLD-SCNC: 1.7 MMOL/L (ref 0.5–2)
LEUKOCYTE ESTERASE UR QL STRIP.AUTO: ABNORMAL
LYMPHOCYTES # BLD AUTO: 1.99 K/UL (ref 1–4.8)
LYMPHOCYTES NFR BLD: 26.8 % (ref 22–41)
MCH RBC QN AUTO: 25.1 PG (ref 27–33)
MCHC RBC AUTO-ENTMCNC: 31.9 G/DL (ref 33.7–35.3)
MCV RBC AUTO: 78.9 FL (ref 81.4–97.8)
MICRO URNS: ABNORMAL
MONOCYTES # BLD AUTO: 1.22 K/UL (ref 0–0.85)
MONOCYTES NFR BLD AUTO: 16.4 % (ref 0–13.4)
NEUTROPHILS # BLD AUTO: 4.11 K/UL (ref 1.82–7.42)
NEUTROPHILS NFR BLD: 55.4 % (ref 44–72)
NITRITE UR QL STRIP.AUTO: NEGATIVE
NRBC # BLD AUTO: 0 K/UL
NRBC BLD-RTO: 0 /100 WBC
PH UR STRIP.AUTO: 5.5 [PH]
PLATELET # BLD AUTO: 405 K/UL (ref 164–446)
PMV BLD AUTO: 8.6 FL (ref 9–12.9)
POTASSIUM SERPL-SCNC: 3.7 MMOL/L (ref 3.6–5.5)
PROT SERPL-MCNC: 7 G/DL (ref 6–8.2)
PROT UR QL STRIP: NEGATIVE MG/DL
RBC # BLD AUTO: 3.74 M/UL (ref 4.7–6.1)
RBC # URNS HPF: ABNORMAL /HPF
RBC UR QL AUTO: ABNORMAL
SODIUM SERPL-SCNC: 140 MMOL/L (ref 135–145)
SP GR UR STRIP.AUTO: 1.02
UROBILINOGEN UR STRIP.AUTO-MCNC: 0.2 MG/DL
WBC # BLD AUTO: 7.4 K/UL (ref 4.8–10.8)
WBC #/AREA URNS HPF: ABNORMAL /HPF

## 2018-08-22 PROCEDURE — 80053 COMPREHEN METABOLIC PANEL: CPT

## 2018-08-22 PROCEDURE — 700111 HCHG RX REV CODE 636 W/ 250 OVERRIDE (IP): Performed by: HOSPITALIST

## 2018-08-22 PROCEDURE — 770006 HCHG ROOM/CARE - MED/SURG/GYN SEMI*

## 2018-08-22 PROCEDURE — 302242 IV POLE: Performed by: HOSPITALIST

## 2018-08-22 PROCEDURE — 700117 HCHG RX CONTRAST REV CODE 255: Performed by: HOSPITALIST

## 2018-08-22 PROCEDURE — 96365 THER/PROPH/DIAG IV INF INIT: CPT

## 2018-08-22 PROCEDURE — 71046 X-RAY EXAM CHEST 2 VIEWS: CPT

## 2018-08-22 PROCEDURE — 81001 URINALYSIS AUTO W/SCOPE: CPT

## 2018-08-22 PROCEDURE — 700102 HCHG RX REV CODE 250 W/ 637 OVERRIDE(OP): Performed by: EMERGENCY MEDICINE

## 2018-08-22 PROCEDURE — 303105 HCHG CATHETER EXTRA

## 2018-08-22 PROCEDURE — A9270 NON-COVERED ITEM OR SERVICE: HCPCS | Performed by: EMERGENCY MEDICINE

## 2018-08-22 PROCEDURE — 83036 HEMOGLOBIN GLYCOSYLATED A1C: CPT

## 2018-08-22 PROCEDURE — 36415 COLL VENOUS BLD VENIPUNCTURE: CPT

## 2018-08-22 PROCEDURE — 87086 URINE CULTURE/COLONY COUNT: CPT

## 2018-08-22 PROCEDURE — 85025 COMPLETE CBC W/AUTO DIFF WBC: CPT

## 2018-08-22 PROCEDURE — 51702 INSERT TEMP BLADDER CATH: CPT

## 2018-08-22 PROCEDURE — 83605 ASSAY OF LACTIC ACID: CPT

## 2018-08-22 PROCEDURE — 73701 CT LOWER EXTREMITY W/DYE: CPT | Mod: LT

## 2018-08-22 PROCEDURE — 99223 1ST HOSP IP/OBS HIGH 75: CPT | Performed by: HOSPITALIST

## 2018-08-22 PROCEDURE — 700105 HCHG RX REV CODE 258: Performed by: HOSPITALIST

## 2018-08-22 PROCEDURE — 99285 EMERGENCY DEPT VISIT HI MDM: CPT

## 2018-08-22 PROCEDURE — 87040 BLOOD CULTURE FOR BACTERIA: CPT

## 2018-08-22 PROCEDURE — 96375 TX/PRO/DX INJ NEW DRUG ADDON: CPT

## 2018-08-22 RX ORDER — PROMETHAZINE HYDROCHLORIDE 25 MG/1
12.5-25 TABLET ORAL EVERY 4 HOURS PRN
Status: DISCONTINUED | OUTPATIENT
Start: 2018-08-22 | End: 2018-08-28 | Stop reason: HOSPADM

## 2018-08-22 RX ORDER — CLONIDINE HYDROCHLORIDE 0.1 MG/1
0.1 TABLET ORAL EVERY 6 HOURS PRN
Status: DISCONTINUED | OUTPATIENT
Start: 2018-08-22 | End: 2018-08-28 | Stop reason: HOSPADM

## 2018-08-22 RX ORDER — LORAZEPAM 1 MG/1
1 TABLET ORAL ONCE
Status: COMPLETED | OUTPATIENT
Start: 2018-08-22 | End: 2018-08-22

## 2018-08-22 RX ORDER — ONDANSETRON 2 MG/ML
4 INJECTION INTRAMUSCULAR; INTRAVENOUS EVERY 4 HOURS PRN
Status: DISCONTINUED | OUTPATIENT
Start: 2018-08-22 | End: 2018-08-28 | Stop reason: HOSPADM

## 2018-08-22 RX ORDER — SODIUM CHLORIDE 9 MG/ML
INJECTION, SOLUTION INTRAVENOUS CONTINUOUS
Status: DISCONTINUED | OUTPATIENT
Start: 2018-08-22 | End: 2018-08-26

## 2018-08-22 RX ORDER — TAMSULOSIN HYDROCHLORIDE 0.4 MG/1
0.4 CAPSULE ORAL
Status: DISCONTINUED | OUTPATIENT
Start: 2018-08-23 | End: 2018-08-22

## 2018-08-22 RX ORDER — AMOXICILLIN 250 MG
2 CAPSULE ORAL 2 TIMES DAILY
Status: DISCONTINUED | OUTPATIENT
Start: 2018-08-23 | End: 2018-08-28 | Stop reason: HOSPADM

## 2018-08-22 RX ORDER — TRAZODONE HYDROCHLORIDE 50 MG/1
50 TABLET ORAL NIGHTLY
Status: DISCONTINUED | OUTPATIENT
Start: 2018-08-22 | End: 2018-08-22

## 2018-08-22 RX ORDER — POLYETHYLENE GLYCOL 3350 17 G/17G
1 POWDER, FOR SOLUTION ORAL
Status: DISCONTINUED | OUTPATIENT
Start: 2018-08-22 | End: 2018-08-28 | Stop reason: HOSPADM

## 2018-08-22 RX ORDER — GABAPENTIN 100 MG/1
100 CAPSULE ORAL 3 TIMES DAILY
Status: DISCONTINUED | OUTPATIENT
Start: 2018-08-22 | End: 2018-08-22

## 2018-08-22 RX ORDER — PROMETHAZINE HYDROCHLORIDE 25 MG/1
12.5-25 SUPPOSITORY RECTAL EVERY 4 HOURS PRN
Status: DISCONTINUED | OUTPATIENT
Start: 2018-08-22 | End: 2018-08-28 | Stop reason: HOSPADM

## 2018-08-22 RX ORDER — ACETAMINOPHEN 325 MG/1
650 TABLET ORAL EVERY 6 HOURS PRN
Status: DISCONTINUED | OUTPATIENT
Start: 2018-08-22 | End: 2018-08-28 | Stop reason: HOSPADM

## 2018-08-22 RX ORDER — ONDANSETRON 4 MG/1
4 TABLET, ORALLY DISINTEGRATING ORAL EVERY 4 HOURS PRN
Status: DISCONTINUED | OUTPATIENT
Start: 2018-08-22 | End: 2018-08-28 | Stop reason: HOSPADM

## 2018-08-22 RX ORDER — BISACODYL 10 MG
10 SUPPOSITORY, RECTAL RECTAL
Status: DISCONTINUED | OUTPATIENT
Start: 2018-08-22 | End: 2018-08-28 | Stop reason: HOSPADM

## 2018-08-22 RX ADMIN — SODIUM CHLORIDE: 9 INJECTION, SOLUTION INTRAVENOUS at 21:35

## 2018-08-22 RX ADMIN — AMPICILLIN AND SULBACTAM 3 G: 2; 1 INJECTION, POWDER, FOR SOLUTION INTRAVENOUS at 21:35

## 2018-08-22 RX ADMIN — VANCOMYCIN HYDROCHLORIDE 1600 MG: 100 INJECTION, POWDER, LYOPHILIZED, FOR SOLUTION INTRAVENOUS at 22:49

## 2018-08-22 RX ADMIN — LORAZEPAM 1 MG: 1 TABLET ORAL at 19:59

## 2018-08-22 RX ADMIN — IOHEXOL 100 ML: 350 INJECTION, SOLUTION INTRAVENOUS at 22:47

## 2018-08-22 ASSESSMENT — ENCOUNTER SYMPTOMS
PSYCHIATRIC NEGATIVE: 1
CONSTITUTIONAL NEGATIVE: 1
EYES NEGATIVE: 1
GASTROINTESTINAL NEGATIVE: 1
NEUROLOGICAL NEGATIVE: 1
RESPIRATORY NEGATIVE: 1
CARDIOVASCULAR NEGATIVE: 1

## 2018-08-22 NOTE — ED TRIAGE NOTES
Chief Complaint   Patient presents with   • Wound Check     Pateint has large wound on left leg. Wound is 23-24 inches on inside of left leg to outside of left leg.       Patient brought in  from [facility]. Patient left transitional housing and showed back up today. Patient has been a patient of [facility] but has not followed up. Patient A+Ox4. Patient placed back out in [facility] and educated on triage process. Patient with  in [facility].     /85   Pulse 91   Temp 36.9 °C (98.5 °F) (Temporal)   Resp 16   Wt 64.2 kg (141 lb 8.6 oz)   SpO2 96%   BMI 20.31 kg/m²

## 2018-08-23 PROBLEM — S81.802A OPEN WOUND OF LEFT LOWER LEG: Status: ACTIVE | Noted: 2018-08-23

## 2018-08-23 PROBLEM — E87.6 HYPOKALEMIA: Status: ACTIVE | Noted: 2018-08-23

## 2018-08-23 PROBLEM — R33.9 URINARY RETENTION: Status: ACTIVE | Noted: 2018-08-23

## 2018-08-23 PROBLEM — R82.81 PYURIA: Status: ACTIVE | Noted: 2018-08-23

## 2018-08-23 PROBLEM — R41.89 COGNITIVE IMPAIRMENT: Status: ACTIVE | Noted: 2018-08-23

## 2018-08-23 PROBLEM — M24.562 FLEXION CONTRACTURE OF KNEE, LEFT: Status: ACTIVE | Noted: 2018-08-23

## 2018-08-23 LAB
ANION GAP SERPL CALC-SCNC: 10 MMOL/L (ref 0–11.9)
BASOPHILS # BLD AUTO: 0.8 % (ref 0–1.8)
BASOPHILS # BLD: 0.05 K/UL (ref 0–0.12)
BUN SERPL-MCNC: 15 MG/DL (ref 8–22)
CALCIUM SERPL-MCNC: 8.6 MG/DL (ref 8.5–10.5)
CHLORIDE SERPL-SCNC: 106 MMOL/L (ref 96–112)
CO2 SERPL-SCNC: 24 MMOL/L (ref 20–33)
CREAT SERPL-MCNC: 0.61 MG/DL (ref 0.5–1.4)
EOSINOPHIL # BLD AUTO: 0.06 K/UL (ref 0–0.51)
EOSINOPHIL NFR BLD: 0.9 % (ref 0–6.9)
ERYTHROCYTE [DISTWIDTH] IN BLOOD BY AUTOMATED COUNT: 47.6 FL (ref 35.9–50)
EST. AVERAGE GLUCOSE BLD GHB EST-MCNC: 117 MG/DL
GLUCOSE SERPL-MCNC: 105 MG/DL (ref 65–99)
HBA1C MFR BLD: 5.7 % (ref 0–5.6)
HCT VFR BLD AUTO: 29.2 % (ref 42–52)
HGB BLD-MCNC: 9.4 G/DL (ref 14–18)
IMM GRANULOCYTES # BLD AUTO: 0.01 K/UL (ref 0–0.11)
IMM GRANULOCYTES NFR BLD AUTO: 0.2 % (ref 0–0.9)
LYMPHOCYTES # BLD AUTO: 1.78 K/UL (ref 1–4.8)
LYMPHOCYTES NFR BLD: 27.9 % (ref 22–41)
MCH RBC QN AUTO: 25.3 PG (ref 27–33)
MCHC RBC AUTO-ENTMCNC: 32.2 G/DL (ref 33.7–35.3)
MCV RBC AUTO: 78.7 FL (ref 81.4–97.8)
MONOCYTES # BLD AUTO: 0.99 K/UL (ref 0–0.85)
MONOCYTES NFR BLD AUTO: 15.5 % (ref 0–13.4)
NEUTROPHILS # BLD AUTO: 3.48 K/UL (ref 1.82–7.42)
NEUTROPHILS NFR BLD: 54.7 % (ref 44–72)
NRBC # BLD AUTO: 0 K/UL
NRBC BLD-RTO: 0 /100 WBC
PLATELET # BLD AUTO: 373 K/UL (ref 164–446)
PMV BLD AUTO: 8.8 FL (ref 9–12.9)
POTASSIUM SERPL-SCNC: 3.5 MMOL/L (ref 3.6–5.5)
RBC # BLD AUTO: 3.71 M/UL (ref 4.7–6.1)
SODIUM SERPL-SCNC: 140 MMOL/L (ref 135–145)
WBC # BLD AUTO: 6.4 K/UL (ref 4.8–10.8)

## 2018-08-23 PROCEDURE — A9270 NON-COVERED ITEM OR SERVICE: HCPCS | Performed by: FAMILY MEDICINE

## 2018-08-23 PROCEDURE — 700102 HCHG RX REV CODE 250 W/ 637 OVERRIDE(OP): Performed by: FAMILY MEDICINE

## 2018-08-23 PROCEDURE — 85025 COMPLETE CBC W/AUTO DIFF WBC: CPT

## 2018-08-23 PROCEDURE — 36415 COLL VENOUS BLD VENIPUNCTURE: CPT

## 2018-08-23 PROCEDURE — 700105 HCHG RX REV CODE 258: Performed by: HOSPITALIST

## 2018-08-23 PROCEDURE — 700102 HCHG RX REV CODE 250 W/ 637 OVERRIDE(OP): Performed by: HOSPITALIST

## 2018-08-23 PROCEDURE — 700111 HCHG RX REV CODE 636 W/ 250 OVERRIDE (IP): Performed by: HOSPITALIST

## 2018-08-23 PROCEDURE — 97161 PT EVAL LOW COMPLEX 20 MIN: CPT

## 2018-08-23 PROCEDURE — 3E0234Z INTRODUCTION OF SERUM, TOXOID AND VACCINE INTO MUSCLE, PERCUTANEOUS APPROACH: ICD-10-PCS | Performed by: HOSPITALIST

## 2018-08-23 PROCEDURE — 80048 BASIC METABOLIC PNL TOTAL CA: CPT

## 2018-08-23 PROCEDURE — A9270 NON-COVERED ITEM OR SERVICE: HCPCS | Performed by: HOSPITALIST

## 2018-08-23 PROCEDURE — 770006 HCHG ROOM/CARE - MED/SURG/GYN SEMI*

## 2018-08-23 PROCEDURE — 99233 SBSQ HOSP IP/OBS HIGH 50: CPT | Performed by: FAMILY MEDICINE

## 2018-08-23 PROCEDURE — 97597 DBRDMT OPN WND 1ST 20 CM/<: CPT

## 2018-08-23 PROCEDURE — 90732 PPSV23 VACC 2 YRS+ SUBQ/IM: CPT | Performed by: HOSPITALIST

## 2018-08-23 PROCEDURE — 51798 US URINE CAPACITY MEASURE: CPT

## 2018-08-23 RX ORDER — NICOTINE 21 MG/24HR
14 PATCH, TRANSDERMAL 24 HOURS TRANSDERMAL
Status: DISCONTINUED | OUTPATIENT
Start: 2018-08-23 | End: 2018-08-28 | Stop reason: HOSPADM

## 2018-08-23 RX ORDER — POTASSIUM CHLORIDE 20 MEQ/1
20 TABLET, EXTENDED RELEASE ORAL DAILY
Status: DISCONTINUED | OUTPATIENT
Start: 2018-08-23 | End: 2018-08-24

## 2018-08-23 RX ORDER — AMOXICILLIN AND CLAVULANATE POTASSIUM 875; 125 MG/1; MG/1
1 TABLET, FILM COATED ORAL EVERY 12 HOURS
Status: DISCONTINUED | OUTPATIENT
Start: 2018-08-23 | End: 2018-08-26

## 2018-08-23 RX ADMIN — PNEUMOCOCCAL VACCINE POLYVALENT 25 MCG
25; 25; 25; 25; 25; 25; 25; 25; 25; 25; 25; 25; 25; 25; 25; 25; 25; 25; 25; 25; 25; 25; 25 INJECTION, SOLUTION INTRAMUSCULAR; SUBCUTANEOUS at 06:33

## 2018-08-23 RX ADMIN — VANCOMYCIN HYDROCHLORIDE 1000 MG: 100 INJECTION, POWDER, LYOPHILIZED, FOR SOLUTION INTRAVENOUS at 08:10

## 2018-08-23 RX ADMIN — AMPICILLIN AND SULBACTAM 3 G: 2; 1 INJECTION, POWDER, FOR SOLUTION INTRAVENOUS at 06:04

## 2018-08-23 RX ADMIN — AMOXICILLIN AND CLAVULANATE POTASSIUM 1 TABLET: 875; 125 TABLET, FILM COATED ORAL at 17:32

## 2018-08-23 RX ADMIN — POTASSIUM CHLORIDE 20 MEQ: 1500 TABLET, EXTENDED RELEASE ORAL at 08:10

## 2018-08-23 RX ADMIN — ENOXAPARIN SODIUM 40 MG: 100 INJECTION SUBCUTANEOUS at 06:04

## 2018-08-23 RX ADMIN — AMPICILLIN AND SULBACTAM 3 G: 2; 1 INJECTION, POWDER, FOR SOLUTION INTRAVENOUS at 11:25

## 2018-08-23 ASSESSMENT — COGNITIVE AND FUNCTIONAL STATUS - GENERAL
HELP NEEDED FOR BATHING: A LITTLE
MOBILITY SCORE: 23
CLIMB 3 TO 5 STEPS WITH RAILING: A LITTLE
PERSONAL GROOMING: A LITTLE
SUGGESTED CMS G CODE MODIFIER MOBILITY: CI
SUGGESTED CMS G CODE MODIFIER DAILY ACTIVITY: CJ
DAILY ACTIVITIY SCORE: 22

## 2018-08-23 ASSESSMENT — PATIENT HEALTH QUESTIONNAIRE - PHQ9
SUM OF ALL RESPONSES TO PHQ9 QUESTIONS 1 AND 2: 0
1. LITTLE INTEREST OR PLEASURE IN DOING THINGS: NOT AT ALL
2. FEELING DOWN, DEPRESSED, IRRITABLE, OR HOPELESS: NOT AT ALL

## 2018-08-23 ASSESSMENT — ENCOUNTER SYMPTOMS
WEAKNESS: 0
BACK PAIN: 0
ABDOMINAL PAIN: 0
HALLUCINATIONS: 0
NERVOUS/ANXIOUS: 0
FEVER: 0
DIARRHEA: 0
SHORTNESS OF BREATH: 0
SORE THROAT: 0
MEMORY LOSS: 1
DIZZINESS: 0
WHEEZING: 0
VOMITING: 0
NAUSEA: 0
BLURRED VISION: 0
HEARTBURN: 0
HEADACHES: 0
COUGH: 0
CHILLS: 0

## 2018-08-23 ASSESSMENT — PAIN SCALES - GENERAL
PAINLEVEL_OUTOF10: 0
PAINLEVEL_OUTOF10: 0

## 2018-08-23 ASSESSMENT — LIFESTYLE VARIABLES
EVER_SMOKED: YES
ALCOHOL_USE: NO

## 2018-08-23 NOTE — ED PROVIDER NOTES
ED Provider Note    HPI: Patient is a 62-year-old male who presented to the emergency department in the care of [facility]  August 22, 2018 at 2:55 PM with a chief complaint of a left leg wound and possible urinary tract infection.    His  is concerned the patient has difficulty with chronic left leg wound and indwelling Mcleod catheter.  Patient seems somewhat confused about why he is here and why he had a catheter in.  The patient denied fever chills cough.  No chest pain no shortness of breath no abdominal pain.  No other somatic complain    Review of Systems: Cannot be obtained due to presenting mental status    Past medical/surgical history: Chronic Mcleod catheter placement I believe for BPH alcohol abuse chronic left leg wound    Medications: Trazodone Neurontin Flomax but compliance uncertain    Allergies: None    Social History: Previous heavy use of alcohol none for the last 6 weeks patient smokes several cigarettes daily      Physical exam: Constitutional: Slender male awake alert appeared slightly confused  Vital signs: Temperature 98.5 pulse 91 respirations 16 blood pressure 143/85 pulse oximetry 96% on room  EYES: PERRL, EOMI, Conjunctivae and sclera normal, eyelids normal bilaterally.  Neck: Trachea midline. No cervical masses seen or palpated. Normal range of motion, supple. No meningeal signs elicited.  Cardiac: Regular rate and rhythm. S1-S2 present. No S3 or S4 present. No murmurs, rubs, or gallops heard. No edema or varicosities were seen.   Lungs: Clear to auscultation with good aeration throughout. No wheezes, rales, or rhonchi heard. Patient's chest wall moved symmetrically with each respiratory effort. Patient was not making use of accessory muscles of respiration in breathing.  Abdomen: Soft nontender to palpation. No rebound or guarding elicited. No organomegaly identified. No pulsatile abdominal masses identified.   Musculoskeletal:  no  pain with palpitation or  movement of muscle, bone or joint , no obvious musculoskeletal deformities identified.  Neurologic: alert and awake answers questions appropriately and seems somewhat confused and tangential.  He moves all 4 extremities independently.  No gross focal neurologic abnormalities were identified.  Skin: Patient has an erythematous rash running along the medial aspect of the left leg from just beneath the groin down to the knee region.  This appears to be chronic in nature with no obvious active drainage.  Psychiatric: not anxious, delusional, or hallucinating.    Medical decision making: Laboratory studies were obtained (please see lab sheet for all results) significant findings included anemia with hemoglobin of 9.4.  Lactic acid normal at 1.7 making sepsis less likely urinalysis obtained after Mcleod catheter was removed and replaced showed evidence of UTI with moderate leukocyte esterase 20-50 white cells    After discussion with pharmacist patient started on Rocephin IV.  Patient will be admitted by the hospitalist service.  I feel that the patient needs evaluation for possible dementia.  I am not convinced at this time he is able to make appropriate decision making about his care.    Patient admitted by hospitalist service.  He does not appear to be septic at this time.  Further care and hospital course per attending physician summary    Impression 1) catheter associated urinary tract infection present on admission  2) cellulitis left lower extremity  3.)  Confusion

## 2018-08-23 NOTE — ED NOTES
Med Rec completed per Pt at bedside  Allergies reviewed  No ABX in last 30 days    Pt denies taking any RX's or OTC's

## 2018-08-23 NOTE — PROGRESS NOTES
Patient sitting up in bed resting comfortably voicing no complaints. Call light within reach. Fall precautions in place.

## 2018-08-23 NOTE — ASSESSMENT & PLAN NOTE
Unclear etiology.  Somewhat oriented currently, but no able to care for self.  Does have indwelling adams catheter, but does not recall initial placement.  Plan for .  This may be related to prior alcoholism.

## 2018-08-23 NOTE — PROGRESS NOTES
Pharmacy Kinetics 62 y.o. male on vancomycin day # 1   2018    Vancomycin New Start   : Vanco load 1600 mg IV at 2249    Indication for Treatment: SSTI - LLE cellulitis    Pertinent history per medical record: Admitted on 2018 for urinary tract infection and LLE cellulitis. Patient reports injuring his leg while hiking a couple of months prior to admission but did not seek medical care. Empiric antibiotics initiated for treatment of patient's SSTI.    Other antibiotics: ampicillin/sulbactam 3 g IV q6hrs    Allergies: Patient has no known allergies.     Concerns for renal function include BUN/SCr ratio > 20:1 & contrast for CT on .    Pertinent cultures to date:   : peripheral blood cx - in process  : urine cx - in process    Imagin/22: CT lower extremity - pending radiology interpretation    Recent Labs      18   WBC  7.4   NEUTSPOLYS  55.40     Recent Labs      18   BUN  20   CREATININE  0.79   ALBUMIN  3.7     No results for input(s): VANCOTROUGH, VANCOPEAK, VANCORANDOM in the last 72 hours.    No intake or output data in the 24 hours ending 18 0038     Blood pressure 159/82, pulse 88, temperature 36.5 °C (97.7 °F), resp. rate 17, weight 64.2 kg (141 lb 8.6 oz), SpO2 95 %. Temp (24hrs), Av.7 °C (98.1 °F), Min:36.5 °C (97.7 °F), Max:36.9 °C (98.5 °F)      A/P   1. Vancomycin dose change: Start vancomycin 1000 mg IV q12hrs  2. Next vancomycin level: 2-3 days (not currently ordered)  3. Goal trough: 12-16 mcg/mL  4. Comments: Empiric vancomycin initiated for treatment of LLE cellulitis in patient at risk for accumulation d/t receipt of contrast this evening. Blood cultures x1 in process, second set of blood cultures pending collection. Will start scheduled vancomycin per protocol and plan trough at steady state to evaluate and adjust as necessary. Wound care to consult per MD notes.    Pharmacy will continue to follow.     Smiley Rinaldi  PharmD

## 2018-08-23 NOTE — CARE PLAN
Problem: Communication  Goal: The ability to communicate needs accurately and effectively will improve  Outcome: PROGRESSING AS EXPECTED  Patient verbalizes needs appropriately to staff.    Problem: Safety  Goal: Will remain free from injury  Outcome: PROGRESSING AS EXPECTED  Fall precautions in place

## 2018-08-23 NOTE — PROGRESS NOTES
2 RN skin check completed, pt has large partial thickness wound spanning out on inner and outer medial L leg, no drainage or purulence noted, pt also has redness on sacrum that's blanching. pt denies pain, no other skin issues, pt turns self from side to side w/o difficulties.

## 2018-08-23 NOTE — CARE PLAN
Problem: Safety  Goal: Will remain free from falls    Intervention: Assess risk factors for falls  Fall measures in place. Bed alarm is on, call light within reach, personal belongings close-by, bed in lowest position, IV pole on same side of bathroom, upper bed rails up, hourly rounding in place. Will continue to monitor pt for safety.       Problem: Infection  Goal: Will remain free from infection    Intervention: Assess signs and symptoms of infection  Pt has wound on LLE, IV abx in place, blood cx pending, pt edu on need for abx treatment.

## 2018-08-23 NOTE — PROGRESS NOTES
Pt arrived to floor on gurney, pt is confused AAOx3 reoriented to situation, unable to complete admit profile, needs frequent reorienting, pt has partial thickness wound on LLE, no weeping noted, denies pain, pt not calling appropriately, making unsafe attempts to get OOB. Fall measures in place. Bed alarm is on, call light within reach, personal belongings close-by, bed in lowest position, IV pole on same side of bathroom, upper bed rails up, hourly rounding in place. Will continue to monitor pt for safety.

## 2018-08-23 NOTE — WOUND TEAM
"[facility]  Inpatient Services  Initial Wound and Skin Care Evaluation    Admission Date:  8/22/2018   HPI, PMH, SH: Reviewed  Unit where seen by Wound Team: S531/02    WOUND CONSULT RELATED TO:  Left LE wound     SUBJECTIVE:  \"I took a tumble on a hike\"      Self Report / Pain Level:  Minimal with sharp debridement - pt states he is 'numb' over most areas of the wound       OBJECTIVE:  Pt in bed, able to move extremities and roll in bed readily.  Wounds are covered with ABD.      WOUND TYPE, LOCATION, CHARACTERISTICS (Pressure ulcers: location, stage, POA or date identified)    Location and type of wound:Left leg, medial thigh/knee/leg, posterior leg at knee crease, trauma         Periwound:    Erythema, flexion contracture of the knee      Drainage:     Minimal serosanguinous      Tissue Type and %:    20% sub optimal granular red, 60% yellow, 20% brown     Wound Edges:   Indurated, crusts, scar tissue  Odor:     Mild foul  Exposed structure(s):   none  S&S of Infection:   Erythema, pt on abx iv      Measurements: Taken 8/23/18      Medial  posterior  Length:    30.0  23.0 cm  Width:     9.0  10.0 cm  Depth:     ua  0.1 cm  Tracts/Undermining:  None  none    Vascular:8/23/18.  Evaluation limited due to motion artifacts at the level of the knee, otherwise the left lower extremity arterial tree appears grossly intact. Scattered atherosclerotic calcifications are seen without significant stenosis identified.  2.  Skin thickening posterior lateral left lower thigh, could represent cellulitis.  3.  Small left fat-containing inguinal hernia  4.  Soft tissue edema below the knee    Lab Values:    WBC:       WBC   Date/Time Value Ref Range Status   08/23/2018 02:07 AM 6.4 4.8 - 10.8 K/uL Final     AIC:      Lab Results   Component Value Date/Time    HBA1C 5.7 (H) 08/22/2018 07:48 PM         INTERVENTIONS BY WOUND TEAM:  Dressings removed, wound cleansed with normal saline.  Sharp debridement of 15 cm2 " yellow slough with tweezers and scissors.  Cleansed wounds again with normal saline.  Covered wounds with honey colloid, abd anchored with hypafix and rolled gauze.      Dressing selection:  Honey colloid         Interdisciplinary consultation:  RN, patient, Dr Rowan     EVALUATION:  Pt with chronic appearing wound,  Also dry with devitalized tissue.  Will improve moisture balance with colloid and decrease bioburden with honey.      Factors affecting wound healing:  Cognition, home situation, immobility due to right knee flexion contracture, poor adhesion to wound care POC   Goals:  Steady decrease in wound area and depth weekly     NURSING PLAN OF CARE ORDERS (X):    Dressing changes: See Dressing Care orders:   X  Skin care: See Skin Care orders:   Rectal tube care: See Rectal Tube Care orders:   Other orders:    RSKIN: CURRENT (X) ORDERED (O)  Q shift [place]:  X  Q shift pressure point assessments:  X  Pressure redistribution mattress        Waffle overlay  NANCY      Bariatric NANCY      Bariatric foam        Heel float boots       Heels floated on pillows      Barrier wipes      Barrier Cream      Barrier paste      Sacral silicone dressing      Silicone O2 tubing      Anchorfast      Trach with Optifoam split foam       Waffle cushion      Rectal tube or BMS      Antifungal tx    Turn q 2 hours     Up to chair     Ambulate   PT/OT     Dietician      PO     TF   TPN   NPO   # days   Other       WOUND TEAM PLAN OF CARE (X):   NPWT change 3 x week:        Dressing changes by wound team:     X next dressing on 8/25/18 then nursing to follow  Follow up as needed:     X  Other (explain):    Anticipated discharge plans (X):  SNF:           Home Care:           Outpatient Wound Center:            Self Care:            Other:         Pt may benefit from SNF placement due to inability to follow POC as an outpatient.

## 2018-08-23 NOTE — ED NOTES
Patient is in Transitional Care: [facility]    : Anabell Akers    [phone] ext. [phone]   [phone] Cell     Patients address:   [address] [place] [id number]    Contact Anabell for help to aid in discharge and placement of patient

## 2018-08-23 NOTE — PROGRESS NOTES
[facility] Hospitalist Progress Note    Date of Service: 2018    Chief Complaint  62 y.o. male admitted 2018 with chronic open wound of the left leg, cellulitis of the involved area, urinary retention with chronic Mcleod catheter, memory impairment.    Interval Problem Update  Open wound -chronic, patient is a poor historian unable to state onset, he has been following with wound care as outpatient  Cellulitis of left leg-some improvement in pain  Urinary retention-unable to state when Mcleod catheter was placed  Memory impairment-he is alert oriented ×3, however has poor memory  Low potassium    Consultants/Specialty  None    Disposition  TBD        Review of Systems   Constitutional: Negative for chills, fever and malaise/fatigue.   HENT: Negative for hearing loss and sore throat.    Eyes: Negative for blurred vision.   Respiratory: Negative for cough, shortness of breath and wheezing.    Cardiovascular: Negative for chest pain and leg swelling.   Gastrointestinal: Negative for abdominal pain, diarrhea, heartburn, nausea and vomiting.   Genitourinary: Negative for dysuria.   Musculoskeletal: Negative for back pain and joint pain.   Skin: Negative for rash.   Neurological: Negative for dizziness, weakness and headaches.   Psychiatric/Behavioral: Positive for memory loss. Negative for hallucinations. The patient is not nervous/anxious.       Physical Exam  Laboratory/Imaging   Hemodynamics  Temp (24hrs), Av.8 °C (98.3 °F), Min:36.5 °C (97.7 °F), Max:37.3 °C (99.1 °F)   Temperature: 37.3 °C (99.1 °F)  Pulse  Av.9  Min: 77  Max: 99    Blood Pressure: 135/79, NIBP: 132/77      Respiratory      Respiration: 17, Pulse Oximetry: 96 %             Fluids    Intake/Output Summary (Last 24 hours) at 18 1045  Last data filed at 18 0335   Gross per 24 hour   Intake                0 ml   Output              800 ml   Net             -800 ml       Nutrition  Orders Placed This Encounter   Procedures   •  Diet Order Regular     Standing Status:   Standing     Number of Occurrences:   1     Order Specific Question:   Diet:     Answer:   Regular [1]     Physical Exam   Constitutional: He is oriented to person, place, and time. He appears well-developed and well-nourished.   HENT:   Head: Normocephalic and atraumatic.   Eyes: Pupils are equal, round, and reactive to light. Conjunctivae are normal.   Neck: No tracheal deviation present. No thyromegaly present.   Cardiovascular: Normal rate and regular rhythm.    Pulmonary/Chest: Effort normal and breath sounds normal.   Abdominal: Soft. Bowel sounds are normal. He exhibits no distension. There is no tenderness.   Musculoskeletal: He exhibits no edema.   Extensive wound involving the medial posterior and lateral aspects of the left knee and leg   Lymphadenopathy:     He has no cervical adenopathy.   Neurological: He is alert and oriented to person, place, and time.   Nursing note and vitals reviewed.      Recent Labs      08/22/18 1957 08/23/18   0207   WBC  7.4  6.4   RBC  3.74*  3.71*   HEMOGLOBIN  9.4*  9.4*   HEMATOCRIT  29.5*  29.2*   MCV  78.9*  78.7*   MCH  25.1*  25.3*   MCHC  31.9*  32.2*   RDW  48.0  47.6   PLATELETCT  405  373   MPV  8.6*  8.8*     Recent Labs      08/22/18 1957 08/23/18   0207   SODIUM  140  140   POTASSIUM  3.7  3.5*   CHLORIDE  104  106   CO2  26  24   GLUCOSE  102*  105*   BUN  20  15   CREATININE  0.79  0.61   CALCIUM  9.3  8.6                      Assessment/Plan     * Cellulitis of left lower extremity- (present on admission)   Assessment & Plan    IV vancomycin and Unasyn  Follow culture          Open wound of left lower leg- (present on admission)   Assessment & Plan    Chronic  Wound care  Will need outpatient follow-up with plastic surgery        Hypokalemia- (present on admission)   Assessment & Plan    Start K-Dur, follow BMP, magnesium and phosphorus        Urinary retention- (present on admission)   Assessment & Plan     Continue Mcleod for now  Start Flomax        Pyuria- (present on admission)   Assessment & Plan    Follow culture        Flexion contracture of knee, 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)   Assessment & Plan    Will need outpatient follow-up with plastic or orthopedic surgery        Cognitive impairment- (present on admission)   Assessment & Plan    Check B12 and TSH        Microcytic anemia- (present on admission)   Assessment & Plan    Follow CBC  Check anemia profile          Tobacco use- (present on admission)   Assessment & Plan    Start nicotine replacement protocol        Alcoholism in remission (HCC)- (present on admission)   Assessment & Plan    Check B12 folate TSH          Quality-Core Measures   Reviewed items::  Radiology images reviewed, Labs reviewed and Medications reviewed  Mcleod catheter::  Urinary Tract Retention or Urinary Tract Obstruction  DVT prophylaxis pharmacological::  Enoxaparin (Lovenox)  Ulcer Prophylaxis::  No  Antibiotics:  Treating active infection/contamination beyond 24 hours perioperative coverage

## 2018-08-23 NOTE — PROGRESS NOTES
Pharmacy Kinetics 62 y.o. male on vancomycin day # 2  2018    Currently on Vancomycin 1000 mg iv q12hr    Indication for Treatment: SSTI - LLE cellulitis     Pertinent history per medical record: Admitted on 2018 for urinary tract infection and LLE cellulitis. Patient reports injuring his leg while hiking a couple of months prior to admission but did not seek medical care and has chronic open wound of the left leg, cellulitis of the involved area. Empiric antibiotics initiated for treatment of patient's SSTI.     Other antibiotics: ampicillin/sulbactam 3 g IV q6hrs    Allergies: Patient has no known allergies.     List concerns for renal function: BUN/SCr ratio > 20:1, contrast with CT on     Pertinent cultures to date:   18: PBC x 1 = NGTD    Recent Labs      18   0207   WBC  7.4  6.4   NEUTSPOLYS  55.40  54.70     Recent Labs      18   0207   BUN  20  15   CREATININE  0.79  0.61   ALBUMIN  3.7   --      No results for input(s): VANCOTROUGH, VANCOPEAK, VANCORANDOM in the last 72 hours.  Intake/Output Summary (Last 24 hours) at 18 1117  Last data filed at 18 0335   Gross per 24 hour   Intake                0 ml   Output              800 ml   Net             -800 ml      Blood pressure 135/79, pulse 78, temperature 37.3 °C (99.1 °F), resp. rate 17, weight 64.2 kg (141 lb 8.6 oz), SpO2 96 %. Temp (24hrs), Av.8 °C (98.3 °F), Min:36.5 °C (97.7 °F), Max:37.3 °C (99.1 °F)      A/P   1. Vancomycin dose change: No  2. Next vancomycin level: 0830 on   3. Goal trough: 12-16 mcg/ml  4. Comments: Pt started vanco maintenance dosing this morning. Will check a vanco level before tomorrow morning's dose. Blood culture x 1 with no growth so far. Wound culture? If MRSA infection can be ruled out, then recommend stropping vanco.    Perry Mendiola, ChynaD

## 2018-08-23 NOTE — H&P
Hospital Medicine History & Physical Note    Date of Service  8/22/2018    Primary Care Physician  Bharti Ramirez M.D.    Consultants  none    Code Status  Full code     Chief Complaint  Leg pain    History of Presenting Illness  62 y.o. male with history of apparent alcoholism, was in his usual state of health until approximately 2 months prior to admission.  At that time, he had a trauma involving a hike, where he injured his left leg quite extensively.  He reports that he did not follow with any further medical care, and instead waited for it to heal.  He reports that in the past few days, the prior wound has felt worse, made worse by any movement.  He has to hobble on that leg.  He is a somewhat poor historian and has difficulty answering any further questions.    Review of Systems  Review of Systems   Constitutional: Negative.    HENT: Negative.    Eyes: Negative.    Respiratory: Negative.    Cardiovascular: Negative.    Gastrointestinal: Negative.    Genitourinary: Negative.    Musculoskeletal: Positive for joint pain.   Skin: Negative.    Neurological: Negative.    Endo/Heme/Allergies: Negative.    Psychiatric/Behavioral: Negative.        Past Medical History   has a past medical history of Psychiatric problem; Restless leg; and Tobacco use.    Surgical History   has a past surgical history that includes ankle orif (Right).     Family History  family history includes Heart Disease in his mother; No Known Problems in his father.     Social History   reports that he has been smoking Cigarettes.  He has a 12.00 pack-year smoking history. He has never used smokeless tobacco. He reports that he uses drugs, including Marijuana and Inhaled. He reports that he does not drink alcohol.    Allergies  No Known Allergies    Medications  Prior to Admission Medications   Prescriptions Last Dose Informant Patient Reported? Taking?   gabapentin (NEURONTIN) 100 MG Cap   Yes No   Sig: Take 100 mg by mouth 3 times a day.    tamsulosin (FLOMAX) 0.4 MG capsule   Yes No   Sig: Take 0.4 mg by mouth ONE-HALF HOUR AFTER BREAKFAST.   traZODone (DESYREL) 50 MG Tab   Yes No   Sig: Take 50 mg by mouth every evening.      Facility-Administered Medications: None       Physical Exam  Blood Pressure: 152/94   Temperature: 36.7 °C (98.1 °F)   Pulse: 77   Respiration: 18   Pulse Oximetry: 92 %     Physical Exam   Constitutional: He appears well-developed and well-nourished. No distress.   HENT:   Head: Normocephalic and atraumatic.   Eyes: Pupils are equal, round, and reactive to light. Conjunctivae are normal.   Neck: Normal range of motion. Neck supple. No tracheal deviation present. No thyromegaly present.   Cardiovascular: Normal rate, regular rhythm and normal heart sounds.  Exam reveals no gallop and no friction rub.    No murmur heard.  Pulmonary/Chest: Effort normal and breath sounds normal. No respiratory distress. He has no wheezes.   Abdominal: Soft. Bowel sounds are normal. He exhibits no distension and no mass. There is no tenderness. There is no rebound and no guarding.   Musculoskeletal: Normal range of motion. He exhibits deformity. He exhibits no edema.   Flexion contracture of the left lower extremity.  This is related to a quite extensive wound on the medial aspect of the leg, extending from the mid thigh through the calf.  There are areas of scarring, edema, patches of probable fluctuance, but no drainage.  Of note the flexion contracture seems to be due to poor healing of this wound.   Lymphadenopathy:     He has no cervical adenopathy.   Neurological: He is alert. No cranial nerve deficit.   Skin: Skin is warm and dry. He is not diaphoretic. There is erythema.   Psychiatric: He has a normal mood and affect.   Nursing note and vitals reviewed.      Laboratory:  Recent Labs      08/22/18 1957   WBC  7.4   RBC  3.74*   HEMOGLOBIN  9.4*   HEMATOCRIT  29.5*   MCV  78.9*   MCH  25.1*   MCHC  31.9*   RDW  48.0   PLATELETCT  405    MPV  8.6*     Recent Labs      08/22/18 1957   SODIUM  140   POTASSIUM  3.7   CHLORIDE  104   CO2  26   GLUCOSE  102*   BUN  20   CREATININE  0.79   CALCIUM  9.3     Recent Labs      08/22/18 1957   ALTSGPT  32   ASTSGOT  36   ALKPHOSPHAT  96   TBILIRUBIN  0.4   GLUCOSE  102*                 No results found for: TROPONINI    Urinalysis:    Recent Labs      08/22/18 2010   SPECGRAVITY  1.020   GLUCOSEUR  Negative   KETONES  Negative   NITRITE  Negative   LEUKESTERAS  Moderate*   WBCURINE  20-50*   RBCURINE  *   BACTERIA  Negative   EPITHELCELL  Negative        Imaging:  DX-CHEST-2 VIEWS    (Results Pending)         Assessment/Plan:  I anticipate this patient will require at least two midnights for appropriate medical management, necessitating inpatient admission.    * Cellulitis of left lower extremity   Assessment & Plan    Related to underlying prior trauma with no apparent surgery or wound clean out per patient history.  Will order wound consultation, IV antibiotics, follow any culture results.  Currently no drainage or purulence.  Will benefit from scanning to evaluate the wound further.          UTI (urinary tract infection)   Assessment & Plan    Likely chronic due to indwelling adams catheterization.  Follow culture results, maintain catheter care.  Suspect polymicrobial or contaminant         Microcytic anemia   Assessment & Plan    Unclear etiology.  Suspect iron deficiency or chronic disease.  Monitor. No evidence of bleed. Transfuse if needed for hemoglobin less than 7 gm/dL          Memory loss- (present on admission)   Assessment & Plan    Unclear etiology.  Somewhat oriented currently, but no able to care for self.  Does have indwelling adams catheter, but does not recall initial placement.  Plan for .  This may be related to prior alcoholism.              VTE prophylaxis: SCD, Lovenox

## 2018-08-24 ENCOUNTER — APPOINTMENT (OUTPATIENT)
Dept: WOUND CARE | Facility: MEDICAL CENTER | Age: 63
End: 2018-08-24
Attending: NURSE PRACTITIONER
Payer: MEDICAID

## 2018-08-24 PROBLEM — E83.42 HYPOMAGNESEMIA: Status: ACTIVE | Noted: 2018-08-24

## 2018-08-24 LAB
ANION GAP SERPL CALC-SCNC: 6 MMOL/L (ref 0–11.9)
BACTERIA UR CULT: NORMAL
BASOPHILS # BLD AUTO: 1 % (ref 0–1.8)
BASOPHILS # BLD: 0.07 K/UL (ref 0–0.12)
BUN SERPL-MCNC: 10 MG/DL (ref 8–22)
CALCIUM SERPL-MCNC: 8.4 MG/DL (ref 8.5–10.5)
CHLORIDE SERPL-SCNC: 106 MMOL/L (ref 96–112)
CO2 SERPL-SCNC: 26 MMOL/L (ref 20–33)
CREAT SERPL-MCNC: 0.7 MG/DL (ref 0.5–1.4)
EOSINOPHIL # BLD AUTO: 0.12 K/UL (ref 0–0.51)
EOSINOPHIL NFR BLD: 1.7 % (ref 0–6.9)
ERYTHROCYTE [DISTWIDTH] IN BLOOD BY AUTOMATED COUNT: 48.2 FL (ref 35.9–50)
FOLATE SERPL-MCNC: 18.9 NG/ML
GLUCOSE SERPL-MCNC: 99 MG/DL (ref 65–99)
HCT VFR BLD AUTO: 28.3 % (ref 42–52)
HGB BLD-MCNC: 8.9 G/DL (ref 14–18)
IMM GRANULOCYTES # BLD AUTO: 0.03 K/UL (ref 0–0.11)
IMM GRANULOCYTES NFR BLD AUTO: 0.4 % (ref 0–0.9)
IRON SATN MFR SERPL: 6 % (ref 15–55)
IRON SERPL-MCNC: 20 UG/DL (ref 50–180)
LYMPHOCYTES # BLD AUTO: 1.87 K/UL (ref 1–4.8)
LYMPHOCYTES NFR BLD: 27.2 % (ref 22–41)
MAGNESIUM SERPL-MCNC: 1.6 MG/DL (ref 1.5–2.5)
MCH RBC QN AUTO: 25 PG (ref 27–33)
MCHC RBC AUTO-ENTMCNC: 31.4 G/DL (ref 33.7–35.3)
MCV RBC AUTO: 79.5 FL (ref 81.4–97.8)
MONOCYTES # BLD AUTO: 0.92 K/UL (ref 0–0.85)
MONOCYTES NFR BLD AUTO: 13.4 % (ref 0–13.4)
NEUTROPHILS # BLD AUTO: 3.87 K/UL (ref 1.82–7.42)
NEUTROPHILS NFR BLD: 56.3 % (ref 44–72)
NRBC # BLD AUTO: 0 K/UL
NRBC BLD-RTO: 0 /100 WBC
PHOSPHATE SERPL-MCNC: 4.8 MG/DL (ref 2.5–4.5)
PLATELET # BLD AUTO: 347 K/UL (ref 164–446)
PMV BLD AUTO: 9.3 FL (ref 9–12.9)
POTASSIUM SERPL-SCNC: 3.4 MMOL/L (ref 3.6–5.5)
RBC # BLD AUTO: 3.56 M/UL (ref 4.7–6.1)
SIGNIFICANT IND 70042: NORMAL
SITE SITE: NORMAL
SODIUM SERPL-SCNC: 138 MMOL/L (ref 135–145)
SOURCE SOURCE: NORMAL
TIBC SERPL-MCNC: 357 UG/DL (ref 250–450)
TSH SERPL DL<=0.005 MIU/L-ACNC: 1.27 UIU/ML (ref 0.38–5.33)
VIT B12 SERPL-MCNC: 306 PG/ML (ref 211–911)
WBC # BLD AUTO: 6.9 K/UL (ref 4.8–10.8)

## 2018-08-24 PROCEDURE — 700111 HCHG RX REV CODE 636 W/ 250 OVERRIDE (IP): Performed by: HOSPITALIST

## 2018-08-24 PROCEDURE — 700102 HCHG RX REV CODE 250 W/ 637 OVERRIDE(OP): Performed by: HOSPITALIST

## 2018-08-24 PROCEDURE — 84443 ASSAY THYROID STIM HORMONE: CPT

## 2018-08-24 PROCEDURE — 85025 COMPLETE CBC W/AUTO DIFF WBC: CPT

## 2018-08-24 PROCEDURE — 36415 COLL VENOUS BLD VENIPUNCTURE: CPT

## 2018-08-24 PROCEDURE — 82746 ASSAY OF FOLIC ACID SERUM: CPT

## 2018-08-24 PROCEDURE — A9270 NON-COVERED ITEM OR SERVICE: HCPCS | Performed by: FAMILY MEDICINE

## 2018-08-24 PROCEDURE — 700102 HCHG RX REV CODE 250 W/ 637 OVERRIDE(OP): Performed by: FAMILY MEDICINE

## 2018-08-24 PROCEDURE — 83540 ASSAY OF IRON: CPT

## 2018-08-24 PROCEDURE — 83735 ASSAY OF MAGNESIUM: CPT

## 2018-08-24 PROCEDURE — 80048 BASIC METABOLIC PNL TOTAL CA: CPT

## 2018-08-24 PROCEDURE — 99232 SBSQ HOSP IP/OBS MODERATE 35: CPT | Performed by: FAMILY MEDICINE

## 2018-08-24 PROCEDURE — 82607 VITAMIN B-12: CPT

## 2018-08-24 PROCEDURE — 84100 ASSAY OF PHOSPHORUS: CPT

## 2018-08-24 PROCEDURE — 83550 IRON BINDING TEST: CPT

## 2018-08-24 PROCEDURE — A9270 NON-COVERED ITEM OR SERVICE: HCPCS | Performed by: HOSPITALIST

## 2018-08-24 PROCEDURE — 770006 HCHG ROOM/CARE - MED/SURG/GYN SEMI*

## 2018-08-24 RX ORDER — POTASSIUM CHLORIDE 20 MEQ/1
20 TABLET, EXTENDED RELEASE ORAL 2 TIMES DAILY
Status: DISCONTINUED | OUTPATIENT
Start: 2018-08-24 | End: 2018-08-26

## 2018-08-24 RX ADMIN — AMOXICILLIN AND CLAVULANATE POTASSIUM 1 TABLET: 875; 125 TABLET, FILM COATED ORAL at 05:23

## 2018-08-24 RX ADMIN — DOCUSATE SODIUM -SENNOSIDES 2 TABLET: 50; 8.6 TABLET, COATED ORAL at 17:00

## 2018-08-24 RX ADMIN — MAGNESIUM GLUCONATE 500 MG ORAL TABLET 400 MG: 500 TABLET ORAL at 08:46

## 2018-08-24 RX ADMIN — POTASSIUM CHLORIDE 20 MEQ: 1500 TABLET, EXTENDED RELEASE ORAL at 17:01

## 2018-08-24 RX ADMIN — POTASSIUM CHLORIDE 20 MEQ: 1500 TABLET, EXTENDED RELEASE ORAL at 05:23

## 2018-08-24 RX ADMIN — AMOXICILLIN AND CLAVULANATE POTASSIUM 1 TABLET: 875; 125 TABLET, FILM COATED ORAL at 17:01

## 2018-08-24 RX ADMIN — DOCUSATE SODIUM -SENNOSIDES 2 TABLET: 50; 8.6 TABLET, COATED ORAL at 05:22

## 2018-08-24 RX ADMIN — PROCHLORPERAZINE EDISYLATE 10 MG: 5 INJECTION INTRAMUSCULAR; INTRAVENOUS at 23:04

## 2018-08-24 RX ADMIN — ENOXAPARIN SODIUM 40 MG: 100 INJECTION SUBCUTANEOUS at 05:23

## 2018-08-24 RX ADMIN — ACETAMINOPHEN 650 MG: 325 TABLET, FILM COATED ORAL at 23:04

## 2018-08-24 RX ADMIN — MAGNESIUM GLUCONATE 500 MG ORAL TABLET 400 MG: 500 TABLET ORAL at 17:01

## 2018-08-24 RX ADMIN — NICOTINE 14 MG: 14 PATCH, EXTENDED RELEASE TRANSDERMAL at 05:23

## 2018-08-24 ASSESSMENT — ENCOUNTER SYMPTOMS
DIARRHEA: 0
HALLUCINATIONS: 0
NERVOUS/ANXIOUS: 0
ABDOMINAL PAIN: 0
SHORTNESS OF BREATH: 0
BACK PAIN: 0
BLURRED VISION: 0
VOMITING: 0
CHILLS: 0
COUGH: 0
DIZZINESS: 0
WEAKNESS: 0
SORE THROAT: 0
HEADACHES: 0
FEVER: 0
NAUSEA: 0
WHEEZING: 0
MEMORY LOSS: 1
HEARTBURN: 0

## 2018-08-24 ASSESSMENT — PAIN SCALES - GENERAL
PAINLEVEL_OUTOF10: 0
PAINLEVEL_OUTOF10: 0
PAINLEVEL_OUTOF10: 5

## 2018-08-24 NOTE — DISCHARGE PLANNING
Anticipated Discharge Disposition: SNF for skilled therapies.    Action: RN CM met with pt and discussed dc plan and possible surgery on his left leg. SNF CHOICE explained, obtained, and faxed to [facility] at ext. [phone].    Barriers to Discharge: Medical clearance, accepting snf.    Plan: RN CM to follow-up with referrals.

## 2018-08-24 NOTE — CARE PLAN
Problem: Communication  Goal: The ability to communicate needs accurately and effectively will improve  Outcome: NOT MET  Patient does not express needs and is impulsive.    Problem: Safety  Goal: Will remain free from injury  Outcome: PROGRESSING AS EXPECTED  Fall precautions in place.

## 2018-08-24 NOTE — PROGRESS NOTES
[facility] Hospitalist Progress Note    Date of Service: 2018    Chief Complaint  62 y.o. male admitted 2018 with chronic open wound of the left leg, cellulitis of the involved area, urinary retention with chronic Mcleod catheter, memory impairment.    Interval Problem Update  Open wound -chronic,  Cellulitis of left leg-less surrounding erythema  Memory impairment-he is alert oriented ×3, however has poor memory  Low potassium and magnesium    Consultants/Specialty  None    Disposition  SNF        Review of Systems   Constitutional: Negative for chills, fever and malaise/fatigue.   HENT: Negative for hearing loss and sore throat.    Eyes: Negative for blurred vision.   Respiratory: Negative for cough, shortness of breath and wheezing.    Cardiovascular: Negative for chest pain and leg swelling.   Gastrointestinal: Negative for abdominal pain, diarrhea, heartburn, nausea and vomiting.   Genitourinary: Negative for dysuria.   Musculoskeletal: Negative for back pain and joint pain.   Skin: Negative for rash.   Neurological: Negative for dizziness, weakness and headaches.   Psychiatric/Behavioral: Positive for memory loss. Negative for hallucinations. The patient is not nervous/anxious.       Physical Exam  Laboratory/Imaging   Hemodynamics  Temp (24hrs), Av.8 °C (98.3 °F), Min:36.5 °C (97.7 °F), Max:37.2 °C (98.9 °F)   Temperature: 36.7 °C (98.1 °F)  Pulse  Av.5  Min: 71  Max: 99    Blood Pressure: 130/69      Respiratory      Respiration: 17, Pulse Oximetry: 97 %             Fluids    Intake/Output Summary (Last 24 hours) at 18 1141  Last data filed at 18 0405   Gross per 24 hour   Intake                0 ml   Output             4000 ml   Net            -4000 ml       Nutrition  Orders Placed This Encounter   Procedures   • Diet Order Regular     Standing Status:   Standing     Number of Occurrences:   1     Order Specific Question:   Diet:     Answer:   Regular [1]     Physical Exam    Constitutional: He is oriented to person, place, and time. He appears well-developed and well-nourished.   HENT:   Head: Normocephalic and atraumatic.   Eyes: Pupils are equal, round, and reactive to light. Conjunctivae are normal.   Neck: No tracheal deviation present. No thyromegaly present.   Cardiovascular: Normal rate and regular rhythm.    Pulmonary/Chest: Effort normal and breath sounds normal.   Abdominal: Soft. Bowel sounds are normal. He exhibits no distension. There is no tenderness.   Musculoskeletal: He exhibits no edema.   Extensive wound involving the medial posterior and lateral aspects of the left knee and leg   Lymphadenopathy:     He has no cervical adenopathy.   Neurological: He is alert and oriented to person, place, and time.   Nursing note and vitals reviewed.      Recent Labs      08/22/18 1957 08/23/18 0207 08/24/18   0248   WBC  7.4  6.4  6.9   RBC  3.74*  3.71*  3.56*   HEMOGLOBIN  9.4*  9.4*  8.9*   HEMATOCRIT  29.5*  29.2*  28.3*   MCV  78.9*  78.7*  79.5*   MCH  25.1*  25.3*  25.0*   MCHC  31.9*  32.2*  31.4*   RDW  48.0  47.6  48.2   PLATELETCT  405  373  347   MPV  8.6*  8.8*  9.3     Recent Labs      08/22/18 1957 08/23/18 0207 08/24/18   0248   SODIUM  140  140  138   POTASSIUM  3.7  3.5*  3.4*   CHLORIDE  104  106  106   CO2  26  24  26   GLUCOSE  102*  105*  99   BUN  20  15  10   CREATININE  0.79  0.61  0.70   CALCIUM  9.3  8.6  8.4*                      Assessment/Plan     * Cellulitis of left lower extremity- (present on admission)   Assessment & Plan    Augmentin  Follow culture          Open wound of left lower leg- (present on admission)   Assessment & Plan    Chronic  Wound care  May need plastic surgery        Hypomagnesemia- (present on admission)   Assessment & Plan    Start oral magnesium        Hypokalemia- (present on admission)   Assessment & Plan    Increase K-Dur to 20 meqs BID, follow BMP          Urinary retention- (present on admission)    Assessment & Plan    Continue Mcleod for now  Flomax        Pyuria- (present on admission)   Assessment & Plan    Follow culture        Flexion contracture of knee, 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)   Assessment & Plan    May need plastic and/or orthopedic surgery        Cognitive impairment- (present on admission)   Assessment & Plan    Cognitive evaluation        Microcytic anemia- (present on admission)   Assessment & Plan    Follow CBC            Tobacco use- (present on admission)   Assessment & Plan    Nicotine replacement protocol        Alcoholism in remission (HCC)- (present on admission)   Assessment & Plan    counseling          Quality-Core Measures   Reviewed items::  Radiology images reviewed, Labs reviewed and Medications reviewed  Mcleod catheter::  Urinary Tract Retention or Urinary Tract Obstruction  DVT prophylaxis pharmacological::  Enoxaparin (Lovenox)  Ulcer Prophylaxis::  No  Antibiotics:  Treating active infection/contamination beyond 24 hours perioperative coverage

## 2018-08-24 NOTE — PROGRESS NOTES
"Doctor rounding with patient at bedside. Ordered to discontinue IV fluids. Doctor notified patient has bizarre behavior and will jump out of bed and try to pull on his adams stating \"I need to pee.\" Patient redirected and educated about how adams is draining urine. Patient states \"yeah it goes in the bag through the tube but I need to walk to the bathroom and pee.\" Night nurse stated last night patient would get up and instantly be irritated and stated he would \"shoot himself.\" This nurse approached patient this morning and asked if he had thoughts of harming himself and patient stated \"no.\" When asked about what occurred overnight patient laughed and stated \"Oh I had an [place] friend that used to say that. I'm not really going to do it, I just get mad.\" Physician and this nurse educated patient on severity of such statements and if patient truly feels that way to let us know and if he does not to express his needs and feelings in appropriate ways. Patient verbalizes understanding.  "

## 2018-08-24 NOTE — PROGRESS NOTES
Pt is constantly wanting to void. Explained to pt various times that adams is in place. No evidence of learning, no rational thought regarding adams. Ordered bladder scan to r/o obstruction in adams. Bladder scan results were 15ml.

## 2018-08-24 NOTE — PROGRESS NOTES
Rec'd report from day shift RN. Assumed pt care. Assessment completed. AA&O to self, difficult to reorient, no evidence of learning. Pt has gotten out of bed 3 times to void despite several attempts to try and explain that pt has a adams. Pt becomes easily frustrated and hits his head (slaps himself) with both hands. Asked pt several times to not hit himself. Ambulated pt around the unit several laps for comfort. Dressing in place to LLE, CDI. Pt limps to LLE, weight bearing as tolerated. Bed in lowest position, bed locked, bed alarm on for safety, treaded socks in place, RN and CNA numbers provided, call light within reach.

## 2018-08-24 NOTE — CARE PLAN
Problem: Venous Thromboembolism (VTW)/Deep Vein Thrombosis (DVT) Prevention:  Goal: Patient will participate in Venous Thrombosis (VTE)/Deep Vein Thrombosis (DVT)Prevention Measures  Lovenox subcutaneous given per MAR for VTE prophylaxis.     Problem: Knowledge Deficit  Goal: Knowledge of disease process/condition, treatment plan, diagnostic tests, and medications will improve  Education is ongoing for pt regarding plan of care and adams use. No evidence of learning.

## 2018-08-25 ENCOUNTER — APPOINTMENT (OUTPATIENT)
Dept: RADIOLOGY | Facility: MEDICAL CENTER | Age: 63
DRG: 699 | End: 2018-08-25
Attending: FAMILY MEDICINE
Payer: MEDICAID

## 2018-08-25 LAB
ANION GAP SERPL CALC-SCNC: 6 MMOL/L (ref 0–11.9)
BASOPHILS # BLD AUTO: 1.2 % (ref 0–1.8)
BASOPHILS # BLD: 0.08 K/UL (ref 0–0.12)
BUN SERPL-MCNC: 13 MG/DL (ref 8–22)
CALCIUM SERPL-MCNC: 9.1 MG/DL (ref 8.5–10.5)
CHLORIDE SERPL-SCNC: 104 MMOL/L (ref 96–112)
CO2 SERPL-SCNC: 29 MMOL/L (ref 20–33)
CREAT SERPL-MCNC: 0.85 MG/DL (ref 0.5–1.4)
EOSINOPHIL # BLD AUTO: 0.14 K/UL (ref 0–0.51)
EOSINOPHIL NFR BLD: 2.2 % (ref 0–6.9)
ERYTHROCYTE [DISTWIDTH] IN BLOOD BY AUTOMATED COUNT: 49.1 FL (ref 35.9–50)
GLUCOSE SERPL-MCNC: 102 MG/DL (ref 65–99)
HCT VFR BLD AUTO: 34.8 % (ref 42–52)
HGB BLD-MCNC: 10.8 G/DL (ref 14–18)
IMM GRANULOCYTES # BLD AUTO: 0.03 K/UL (ref 0–0.11)
IMM GRANULOCYTES NFR BLD AUTO: 0.5 % (ref 0–0.9)
LYMPHOCYTES # BLD AUTO: 1.44 K/UL (ref 1–4.8)
LYMPHOCYTES NFR BLD: 22.4 % (ref 22–41)
MCH RBC QN AUTO: 25.2 PG (ref 27–33)
MCHC RBC AUTO-ENTMCNC: 31.2 G/DL (ref 33.7–35.3)
MCV RBC AUTO: 80.7 FL (ref 81.4–97.8)
MONOCYTES # BLD AUTO: 0.9 K/UL (ref 0–0.85)
MONOCYTES NFR BLD AUTO: 14 % (ref 0–13.4)
NEUTROPHILS # BLD AUTO: 3.85 K/UL (ref 1.82–7.42)
NEUTROPHILS NFR BLD: 59.7 % (ref 44–72)
NRBC # BLD AUTO: 0 K/UL
NRBC BLD-RTO: 0 /100 WBC
PLATELET # BLD AUTO: 418 K/UL (ref 164–446)
PMV BLD AUTO: 9.3 FL (ref 9–12.9)
POTASSIUM SERPL-SCNC: 4.1 MMOL/L (ref 3.6–5.5)
RBC # BLD AUTO: 4.25 M/UL (ref 4.7–6.1)
SODIUM SERPL-SCNC: 139 MMOL/L (ref 135–145)
WBC # BLD AUTO: 6.4 K/UL (ref 4.8–10.8)

## 2018-08-25 PROCEDURE — 700102 HCHG RX REV CODE 250 W/ 637 OVERRIDE(OP): Performed by: HOSPITALIST

## 2018-08-25 PROCEDURE — 700102 HCHG RX REV CODE 250 W/ 637 OVERRIDE(OP): Performed by: FAMILY MEDICINE

## 2018-08-25 PROCEDURE — 99232 SBSQ HOSP IP/OBS MODERATE 35: CPT | Performed by: FAMILY MEDICINE

## 2018-08-25 PROCEDURE — 70450 CT HEAD/BRAIN W/O DYE: CPT

## 2018-08-25 PROCEDURE — 770006 HCHG ROOM/CARE - MED/SURG/GYN SEMI*

## 2018-08-25 PROCEDURE — 85025 COMPLETE CBC W/AUTO DIFF WBC: CPT

## 2018-08-25 PROCEDURE — 80048 BASIC METABOLIC PNL TOTAL CA: CPT

## 2018-08-25 PROCEDURE — A9270 NON-COVERED ITEM OR SERVICE: HCPCS | Performed by: HOSPITALIST

## 2018-08-25 PROCEDURE — 700111 HCHG RX REV CODE 636 W/ 250 OVERRIDE (IP): Performed by: HOSPITALIST

## 2018-08-25 PROCEDURE — A9270 NON-COVERED ITEM OR SERVICE: HCPCS | Performed by: FAMILY MEDICINE

## 2018-08-25 PROCEDURE — 36415 COLL VENOUS BLD VENIPUNCTURE: CPT

## 2018-08-25 RX ORDER — TAMSULOSIN HYDROCHLORIDE 0.4 MG/1
0.4 CAPSULE ORAL
Status: DISCONTINUED | OUTPATIENT
Start: 2018-08-25 | End: 2018-08-27 | Stop reason: CLARIF

## 2018-08-25 RX ORDER — OXYCODONE HYDROCHLORIDE 5 MG/1
5-10 TABLET ORAL EVERY 4 HOURS PRN
Status: DISCONTINUED | OUTPATIENT
Start: 2018-08-25 | End: 2018-08-28 | Stop reason: HOSPADM

## 2018-08-25 RX ADMIN — OXYCODONE HYDROCHLORIDE 5 MG: 5 TABLET ORAL at 15:41

## 2018-08-25 RX ADMIN — TAMSULOSIN HYDROCHLORIDE 0.4 MG: 0.4 CAPSULE ORAL at 12:02

## 2018-08-25 RX ADMIN — ACETAMINOPHEN 650 MG: 325 TABLET, FILM COATED ORAL at 05:22

## 2018-08-25 RX ADMIN — ACETAMINOPHEN 650 MG: 325 TABLET, FILM COATED ORAL at 12:02

## 2018-08-25 RX ADMIN — POTASSIUM CHLORIDE 20 MEQ: 1500 TABLET, EXTENDED RELEASE ORAL at 17:14

## 2018-08-25 RX ADMIN — ENOXAPARIN SODIUM 40 MG: 100 INJECTION SUBCUTANEOUS at 05:24

## 2018-08-25 RX ADMIN — MAGNESIUM GLUCONATE 500 MG ORAL TABLET 400 MG: 500 TABLET ORAL at 05:21

## 2018-08-25 RX ADMIN — AMOXICILLIN AND CLAVULANATE POTASSIUM 1 TABLET: 875; 125 TABLET, FILM COATED ORAL at 05:22

## 2018-08-25 RX ADMIN — POTASSIUM CHLORIDE 20 MEQ: 1500 TABLET, EXTENDED RELEASE ORAL at 05:22

## 2018-08-25 RX ADMIN — AMOXICILLIN AND CLAVULANATE POTASSIUM 1 TABLET: 875; 125 TABLET, FILM COATED ORAL at 17:14

## 2018-08-25 RX ADMIN — MAGNESIUM GLUCONATE 500 MG ORAL TABLET 400 MG: 500 TABLET ORAL at 17:15

## 2018-08-25 RX ADMIN — DOCUSATE SODIUM -SENNOSIDES 2 TABLET: 50; 8.6 TABLET, COATED ORAL at 05:21

## 2018-08-25 RX ADMIN — OXYCODONE HYDROCHLORIDE 10 MG: 5 TABLET ORAL at 20:16

## 2018-08-25 RX ADMIN — NICOTINE 14 MG: 14 PATCH, EXTENDED RELEASE TRANSDERMAL at 05:21

## 2018-08-25 ASSESSMENT — ENCOUNTER SYMPTOMS
ABDOMINAL PAIN: 0
DIZZINESS: 0
FEVER: 0
WHEEZING: 0
WEAKNESS: 0
CHILLS: 0
BLURRED VISION: 0
SORE THROAT: 0
VOMITING: 0
HEADACHES: 0
NAUSEA: 0
MEMORY LOSS: 1
DIARRHEA: 0
HALLUCINATIONS: 0
NERVOUS/ANXIOUS: 0
SHORTNESS OF BREATH: 0
HEARTBURN: 0
COUGH: 0
BACK PAIN: 0

## 2018-08-25 ASSESSMENT — PAIN SCALES - GENERAL
PAINLEVEL_OUTOF10: 8
PAINLEVEL_OUTOF10: 7
PAINLEVEL_OUTOF10: 4
PAINLEVEL_OUTOF10: 8
PAINLEVEL_OUTOF10: 0

## 2018-08-25 NOTE — PROGRESS NOTES
Assumed care of pt. Received change of shift report from day shift RN. Assessed pt per protocol; vital signs stable. Respirations even and unlabored. Patient states the need to pee. Informed patient that he has a adams that drains his urine from his bladder. Readjusted adams in [place]. Patient states that it feels a little better. No s/sx of distress or discomfort noted; pt denies. A&O to self. Patient has to be reoriented to place and discharge plan. Patient states that he has not spoken with the SW, despite the SW speaking with him earlier in the day. Bed is locked in lowest position; call light within reach. Treaded, non-slip socks on. Fall precautions in place. Bed alarm in place and active. Will continue to reorient and monitor pt.

## 2018-08-25 NOTE — CARE PLAN
Problem: Safety  Goal: Will remain free from falls  Patient educated on importance of using call light for assistance. Patient continues to get up without using call light. Needs to continuously be reminded to call for assistance before getting up. Treaded, non-slip socks in place. Bed alarm in place and active. Fall precautions and hourly rounding in place.    Problem: Knowledge Deficit  Goal: Knowledge of disease process/condition, treatment plan, diagnostic tests, and medications will improve  Patient continues to ask the same questions about his treatment and discharge plan, despite his questions be answered. There is no evidence of learning. Will continue to reeducate patient.

## 2018-08-25 NOTE — PROGRESS NOTES
Pt AX0 by 4, intermittent confusion. Has no complaints of pain during morning rounds. Dressing to left leg CDI and to be changed today by wound care team. Pt is a SBA, has urinary catheter in place and does not bear weight completely on the left leg. Regular diet and encouraged to increase fluids, pt verbalizes understanding. Pt to discharge to SNF , waiting on placement following surgery of left leg. Hourly rounding in place. Pt loacted close to nurses station, bedside commitment in place, treaded socks and bed alarm on and indicated.

## 2018-08-25 NOTE — PROGRESS NOTES
[facility] Progress Note    Date of Service: 2018    Chief Complaint  62 y.o. male admitted 2018 with chronic open wound of the left leg, cellulitis of the involved area, urinary retention with chronic Mcleod catheter, memory impairment.    Interval Problem Update  Open wound-chronic,  Cellulitis of left leg-less surrounding erythema  Memory impairment-he is alert oriented ×3, however has poor memory    Consultants/Specialty  None    Disposition  SNF        Review of Systems   Constitutional: Negative for chills, fever and malaise/fatigue.   HENT: Negative for hearing loss and sore throat.    Eyes: Negative for blurred vision.   Respiratory: Negative for cough, shortness of breath and wheezing.    Cardiovascular: Negative for chest pain and leg swelling.   Gastrointestinal: Negative for abdominal pain, diarrhea, heartburn, nausea and vomiting.   Genitourinary: Negative for dysuria.   Musculoskeletal: Negative for back pain and joint pain.   Skin: Negative for rash.   Neurological: Negative for dizziness, weakness and headaches.   Psychiatric/Behavioral: Positive for memory loss. Negative for hallucinations. The patient is not nervous/anxious.       Physical Exam  Laboratory/Imaging   Hemodynamics  Temp (24hrs), Av.6 °C (97.9 °F), Min:36.2 °C (97.2 °F), Max:37.2 °C (98.9 °F)   Temperature: 36.4 °C (97.5 °F)  Pulse  Av.4  Min: 62  Max: 99    Blood Pressure: 112/72      Respiratory      Respiration: 16, Pulse Oximetry: 99 %             Fluids    Intake/Output Summary (Last 24 hours) at 18 1152  Last data filed at 18 0900   Gross per 24 hour   Intake              540 ml   Output             3350 ml   Net            -2810 ml       Nutrition  Orders Placed This Encounter   Procedures   • Diet Order Regular     Standing Status:   Standing     Number of Occurrences:   1     Order Specific Question:   Diet:     Answer:   Regular [1]     Physical Exam   Constitutional: He is oriented to  person, place, and time. He appears well-developed and well-nourished.   HENT:   Head: Normocephalic and atraumatic.   Eyes: Pupils are equal, round, and reactive to light. Conjunctivae are normal.   Neck: No tracheal deviation present. No thyromegaly present.   Cardiovascular: Normal rate and regular rhythm.    Pulmonary/Chest: Effort normal and breath sounds normal.   Abdominal: Soft. Bowel sounds are normal. He exhibits no distension. There is no tenderness.   Musculoskeletal: He exhibits no edema.   Extensive wound involving the medial posterior and lateral aspects of the left knee and leg   Lymphadenopathy:     He has no cervical adenopathy.   Neurological: He is alert and oriented to person, place, and time.   Nursing note and vitals reviewed.      Recent Labs      08/23/18 0207 08/24/18 0248 08/25/18   0753   WBC  6.4  6.9  6.4   RBC  3.71*  3.56*  4.25*   HEMOGLOBIN  9.4*  8.9*  10.8*   HEMATOCRIT  29.2*  28.3*  34.8*   MCV  78.7*  79.5*  80.7*   MCH  25.3*  25.0*  25.2*   MCHC  32.2*  31.4*  31.2*   RDW  47.6  48.2  49.1   PLATELETCT  373  347  418   MPV  8.8*  9.3  9.3     Recent Labs      08/23/18 0207 08/24/18 0248  08/25/18   0753   SODIUM  140  138  139   POTASSIUM  3.5*  3.4*  4.1   CHLORIDE  106  106  104   CO2  24  26  29   GLUCOSE  105*  99  102*   BUN  15  10  13   CREATININE  0.61  0.70  0.85   CALCIUM  8.6  8.4*  9.1                      Assessment/Plan     * Cellulitis of left lower extremity- (present on admission)   Assessment & Plan    Augmentin  Follow culture          Open wound of left lower leg- (present on admission)   Assessment & Plan    Chronic  Wound care  May need plastic surgery        Hypomagnesemia- (present on admission)   Assessment & Plan    oral magnesium        Hypokalemia- (present on admission)   Assessment & Plan    decrease K-Dur to 20 meqs daily, follow bmp          Urinary retention- (present on admission)   Assessment & Plan    Continue Mcleod for  now  Flomax        Pyuria- (present on admission)   Assessment & Plan    Follow culture        Flexion contracture of knee, 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)   Assessment & Plan    May need plastic and/or orthopedic surgery        Cognitive impairment- (present on admission)   Assessment & Plan    Cognitive evaluation  Check CT head        Microcytic anemia- (present on admission)   Assessment & Plan    Follow CBC            Tobacco use- (present on admission)   Assessment & Plan    Nicotine replacement protocol        Alcoholism in remission (HCC)- (present on admission)   Assessment & Plan    counseling          Quality-Core Measures   Reviewed items::  Radiology images reviewed, Labs reviewed and Medications reviewed  Mcleod catheter::  Urinary Tract Retention or Urinary Tract Obstruction  DVT prophylaxis pharmacological::  Enoxaparin (Lovenox)  Ulcer Prophylaxis::  No  Antibiotics:  Treating active infection/contamination beyond 24 hours perioperative coverage

## 2018-08-25 NOTE — CARE PLAN
Problem: Infection  Goal: Will remain free from infection  Outcome: PROGRESSING AS EXPECTED  Pt educated that his leg could become infected if he discharges prior to it becoming healed. Pt verifies understanding of the importance of staying until leg is healed.     Problem: Pain Management  Goal: Pain level will decrease to patient's comfort goal  Outcome: PROGRESSING AS EXPECTED  Pt states no pain during morning rounds, hourly rounds in place.

## 2018-08-26 LAB
ANION GAP SERPL CALC-SCNC: 6 MMOL/L (ref 0–11.9)
BASOPHILS # BLD AUTO: 1.1 % (ref 0–1.8)
BASOPHILS # BLD: 0.07 K/UL (ref 0–0.12)
BUN SERPL-MCNC: 16 MG/DL (ref 8–22)
CALCIUM SERPL-MCNC: 8.4 MG/DL (ref 8.5–10.5)
CHLORIDE SERPL-SCNC: 106 MMOL/L (ref 96–112)
CO2 SERPL-SCNC: 28 MMOL/L (ref 20–33)
CREAT SERPL-MCNC: 0.85 MG/DL (ref 0.5–1.4)
EOSINOPHIL # BLD AUTO: 0.12 K/UL (ref 0–0.51)
EOSINOPHIL NFR BLD: 1.9 % (ref 0–6.9)
ERYTHROCYTE [DISTWIDTH] IN BLOOD BY AUTOMATED COUNT: 50.4 FL (ref 35.9–50)
GLUCOSE SERPL-MCNC: 118 MG/DL (ref 65–99)
HCT VFR BLD AUTO: 31.1 % (ref 42–52)
HGB BLD-MCNC: 10 G/DL (ref 14–18)
IMM GRANULOCYTES # BLD AUTO: 0.03 K/UL (ref 0–0.11)
IMM GRANULOCYTES NFR BLD AUTO: 0.5 % (ref 0–0.9)
LYMPHOCYTES # BLD AUTO: 1.74 K/UL (ref 1–4.8)
LYMPHOCYTES NFR BLD: 27.4 % (ref 22–41)
MCH RBC QN AUTO: 26.1 PG (ref 27–33)
MCHC RBC AUTO-ENTMCNC: 32.2 G/DL (ref 33.7–35.3)
MCV RBC AUTO: 81.2 FL (ref 81.4–97.8)
MONOCYTES # BLD AUTO: 0.85 K/UL (ref 0–0.85)
MONOCYTES NFR BLD AUTO: 13.4 % (ref 0–13.4)
NEUTROPHILS # BLD AUTO: 3.54 K/UL (ref 1.82–7.42)
NEUTROPHILS NFR BLD: 55.7 % (ref 44–72)
NRBC # BLD AUTO: 0 K/UL
NRBC BLD-RTO: 0 /100 WBC
PLATELET # BLD AUTO: 341 K/UL (ref 164–446)
PMV BLD AUTO: 9.1 FL (ref 9–12.9)
POTASSIUM SERPL-SCNC: 4.4 MMOL/L (ref 3.6–5.5)
RBC # BLD AUTO: 3.83 M/UL (ref 4.7–6.1)
SODIUM SERPL-SCNC: 140 MMOL/L (ref 135–145)
WBC # BLD AUTO: 6.4 K/UL (ref 4.8–10.8)

## 2018-08-26 PROCEDURE — A9270 NON-COVERED ITEM OR SERVICE: HCPCS | Performed by: HOSPITALIST

## 2018-08-26 PROCEDURE — 700111 HCHG RX REV CODE 636 W/ 250 OVERRIDE (IP): Performed by: HOSPITALIST

## 2018-08-26 PROCEDURE — 700102 HCHG RX REV CODE 250 W/ 637 OVERRIDE(OP): Performed by: FAMILY MEDICINE

## 2018-08-26 PROCEDURE — 80048 BASIC METABOLIC PNL TOTAL CA: CPT

## 2018-08-26 PROCEDURE — 36415 COLL VENOUS BLD VENIPUNCTURE: CPT

## 2018-08-26 PROCEDURE — 770006 HCHG ROOM/CARE - MED/SURG/GYN SEMI*

## 2018-08-26 PROCEDURE — 700102 HCHG RX REV CODE 250 W/ 637 OVERRIDE(OP): Performed by: HOSPITALIST

## 2018-08-26 PROCEDURE — 99232 SBSQ HOSP IP/OBS MODERATE 35: CPT | Performed by: FAMILY MEDICINE

## 2018-08-26 PROCEDURE — A9270 NON-COVERED ITEM OR SERVICE: HCPCS | Performed by: FAMILY MEDICINE

## 2018-08-26 PROCEDURE — 85025 COMPLETE CBC W/AUTO DIFF WBC: CPT

## 2018-08-26 RX ORDER — AMOXICILLIN AND CLAVULANATE POTASSIUM 875; 125 MG/1; MG/1
1 TABLET, FILM COATED ORAL EVERY 12 HOURS
Status: DISCONTINUED | OUTPATIENT
Start: 2018-08-26 | End: 2018-08-28 | Stop reason: HOSPADM

## 2018-08-26 RX ADMIN — OXYCODONE HYDROCHLORIDE 10 MG: 5 TABLET ORAL at 17:44

## 2018-08-26 RX ADMIN — DOCUSATE SODIUM -SENNOSIDES 2 TABLET: 50; 8.6 TABLET, COATED ORAL at 17:44

## 2018-08-26 RX ADMIN — OXYCODONE HYDROCHLORIDE 10 MG: 5 TABLET ORAL at 12:12

## 2018-08-26 RX ADMIN — MAGNESIUM GLUCONATE 500 MG ORAL TABLET 400 MG: 500 TABLET ORAL at 05:46

## 2018-08-26 RX ADMIN — MAGNESIUM GLUCONATE 500 MG ORAL TABLET 400 MG: 500 TABLET ORAL at 17:44

## 2018-08-26 RX ADMIN — DOCUSATE SODIUM -SENNOSIDES 2 TABLET: 50; 8.6 TABLET, COATED ORAL at 05:46

## 2018-08-26 RX ADMIN — AMOXICILLIN AND CLAVULANATE POTASSIUM 1 TABLET: 875; 125 TABLET, FILM COATED ORAL at 05:46

## 2018-08-26 RX ADMIN — POTASSIUM CHLORIDE 20 MEQ: 1500 TABLET, EXTENDED RELEASE ORAL at 05:46

## 2018-08-26 RX ADMIN — AMOXICILLIN AND CLAVULANATE POTASSIUM 1 TABLET: 875; 125 TABLET, FILM COATED ORAL at 17:45

## 2018-08-26 RX ADMIN — TAMSULOSIN HYDROCHLORIDE 0.4 MG: 0.4 CAPSULE ORAL at 07:36

## 2018-08-26 RX ADMIN — ENOXAPARIN SODIUM 40 MG: 100 INJECTION SUBCUTANEOUS at 05:46

## 2018-08-26 ASSESSMENT — ENCOUNTER SYMPTOMS
HALLUCINATIONS: 0
CHILLS: 0
FEVER: 0
SHORTNESS OF BREATH: 0
BLURRED VISION: 0
NAUSEA: 0
HEARTBURN: 0
MEMORY LOSS: 1
COUGH: 0
SORE THROAT: 0
DIARRHEA: 0
HEADACHES: 0
DIZZINESS: 0
ABDOMINAL PAIN: 0
VOMITING: 0
WEAKNESS: 0
NERVOUS/ANXIOUS: 0
BACK PAIN: 0
WHEEZING: 0

## 2018-08-26 ASSESSMENT — PAIN SCALES - GENERAL
PAINLEVEL_OUTOF10: 0
PAINLEVEL_OUTOF10: 0
PAINLEVEL_OUTOF10: 10
PAINLEVEL_OUTOF10: 0
PAINLEVEL_OUTOF10: 8
PAINLEVEL_OUTOF10: 2

## 2018-08-26 NOTE — PROGRESS NOTES
[facility] Progress Note    Date of Service: 2018    Chief Complaint  62 y.o. male admitted 2018 with chronic open wound of the left leg, cellulitis of the involved area, urinary retention with chronic Mcleod catheter, memory impairment.    Interval Problem Update  Open wound-chronic, per wound care seems to be improving  Cellulitis of left leg- resolving  Memory impairment-he is alert oriented ×3, however has poor memory, CT Head negative    Consultants/Specialty  None    Disposition  SNF?        Review of Systems   Constitutional: Negative for chills, fever and malaise/fatigue.   HENT: Negative for hearing loss and sore throat.    Eyes: Negative for blurred vision.   Respiratory: Negative for cough, shortness of breath and wheezing.    Cardiovascular: Negative for chest pain and leg swelling.   Gastrointestinal: Negative for abdominal pain, diarrhea, heartburn, nausea and vomiting.   Genitourinary: Negative for dysuria.   Musculoskeletal: Negative for back pain and joint pain.   Skin: Negative for rash.   Neurological: Negative for dizziness, weakness and headaches.   Psychiatric/Behavioral: Positive for memory loss. Negative for hallucinations. The patient is not nervous/anxious.       Physical Exam  Laboratory/Imaging   Hemodynamics  Temp (24hrs), Av.8 °C (98.3 °F), Min:36.7 °C (98 °F), Max:37.1 °C (98.7 °F)   Temperature: 36.7 °C (98 °F)  Pulse  Av.6  Min: 62  Max: 99    Blood Pressure: 127/88      Respiratory      Respiration: 18, Pulse Oximetry: 94 %             Fluids    Intake/Output Summary (Last 24 hours) at 18 1218  Last data filed at 18 0747   Gross per 24 hour   Intake              420 ml   Output             1650 ml   Net            -1230 ml       Nutrition  Orders Placed This Encounter   Procedures   • Diet Order Regular     Standing Status:   Standing     Number of Occurrences:   1     Order Specific Question:   Diet:     Answer:   Regular [1]     Physical Exam    Constitutional: He is oriented to person, place, and time. He appears well-developed and well-nourished.   HENT:   Head: Normocephalic and atraumatic.   Eyes: Pupils are equal, round, and reactive to light. Conjunctivae are normal.   Neck: No tracheal deviation present. No thyromegaly present.   Cardiovascular: Normal rate and regular rhythm.    Pulmonary/Chest: Effort normal and breath sounds normal.   Abdominal: Soft. Bowel sounds are normal. He exhibits no distension. There is no tenderness.   Musculoskeletal: He exhibits no edema.   Extensive wound involving the medial posterior and lateral aspects of the left knee and leg   Lymphadenopathy:     He has no cervical adenopathy.   Neurological: He is alert and oriented to person, place, and time.   Nursing note and vitals reviewed.      Recent Labs      08/24/18 0248 08/25/18 0753 08/26/18   0040   WBC  6.9  6.4  6.4   RBC  3.56*  4.25*  3.83*   HEMOGLOBIN  8.9*  10.8*  10.0*   HEMATOCRIT  28.3*  34.8*  31.1*   MCV  79.5*  80.7*  81.2*   MCH  25.0*  25.2*  26.1*   MCHC  31.4*  31.2*  32.2*   RDW  48.2  49.1  50.4*   PLATELETCT  347  418  341   MPV  9.3  9.3  9.1     Recent Labs      08/24/18 0248 08/25/18 0753  08/26/18   0040   SODIUM  138  139  140   POTASSIUM  3.4*  4.1  4.4   CHLORIDE  106  104  106   CO2  26  29  28   GLUCOSE  99  102*  118*   BUN  10  13  16   CREATININE  0.70  0.85  0.85   CALCIUM  8.4*  9.1  8.4*                      Assessment/Plan     * Cellulitis of left lower extremity- (present on admission)   Assessment & Plan    Augmentin x 7 days          Open wound of left lower leg- (present on admission)   Assessment & Plan    Chronic  Wound care          Hypomagnesemia- (present on admission)   Assessment & Plan    oral magnesium        Hypokalemia- (present on admission)   Assessment & Plan    stop K-Dur, follow bmp          Urinary retention- (present on admission)   Assessment & Plan    Continue Mcleod for now  Flomax         Pyuria- (present on admission)   Assessment & Plan    Follow culture        Flexion contracture of knee, 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)   Assessment & Plan    Follow up with plastic and/or orthopedic surgery as outpatient        Cognitive impairment- (present on admission)   Assessment & Plan    Cognitive evaluation pending          Microcytic anemia- (present on admission)   Assessment & Plan    Follow CBC            Tobacco use- (present on admission)   Assessment & Plan    Nicotine replacement protocol        Alcoholism in remission (HCC)- (present on admission)   Assessment & Plan    counseling          Quality-Core Measures   Reviewed items::  Radiology images reviewed, Labs reviewed and Medications reviewed  Mcleod catheter::  Urinary Tract Retention or Urinary Tract Obstruction  DVT prophylaxis pharmacological::  Enoxaparin (Lovenox)  Ulcer Prophylaxis::  No  Antibiotics:  Treating active infection/contamination beyond 24 hours perioperative coverage

## 2018-08-26 NOTE — DISCHARGE PLANNING
Received Choice form at [phone]  Agency/Facility Name: [facility]   Referral sent per Choice form @ [id number]

## 2018-08-26 NOTE — CARE PLAN
Problem: Infection  Goal: Will remain free from infection  Outcome: PROGRESSING AS EXPECTED  Antibiotics and wound care orders in place for left lower extremity. No signs of new or worsening infection.    Problem: Fluid Volume:  Goal: Will maintain balanced intake and output  Outcome: MET Date Met: 08/26/18  Pt drinking liquids and staying adequately hydrated.

## 2018-08-26 NOTE — PROGRESS NOTES
Report recieved from night shift nurse. Pt in bed resting. Fall precautions in place. No further needs at this time. Will continue with hourly rounding. Call light in reach.

## 2018-08-26 NOTE — WOUND TEAM
"[facility]  Inpatient Services  Wound and Skin Care Progress Note    HPI, PMH, SH: Reviewed    WOUND TEAM FOLLOW UP: Left leg wound.  Unit where seen by Wound Team: S [id number]      SUBJECTIVE: \"I fell ass over teakettle over and over.\"    Self Report / Pain Level: \"I can't really feel it.\"    OBJECTIVE:  Patient pleasant and engaging.    WOUND TYPE, LOCATION, CHARACTERISTICS:    Location and type of wound: Left leg, medial to posterior: Full thickness wound that appears to have been there for some times. Patient states it is from trauma.        Periwound:     Pink scar tissue, has caused contracture to knee.  Drainage:     Moderate, serous.  Tissue Type and %:    100% pink/red.  Wound Edges:    Attached.  Odor:      None.  Exposed structure(s):  None.  S&S of Infection:    None.    Measurements: Taken 08/25/2018.  Length:     30 cm  Width:      11.2 cm  Depth:     0.2 cm      INTERVENTIONS BY WOUND TEAM: Removed old dressing. Honey hydrocolloid has encouraged autolytic debridement revealing healthy red tissue. Dried honey and exudate removed from periwound with saline and gauze. Wound bed cleaned with saline moistened gauze. Honey hydrocolloid and regular hydrocolloid will produce too much moisture for wounds. Wound beds covered with folded yellow petrolatum gauze, then 4 abdominal pads, secured with roll gauze.     Interdisciplinary consultation: Patient, nursing.    EVALUATION AND PROGRESS OF WOUND(S): Wound bed now clean. Yellow petrolatum gauze will provide optimal moisture and encourage periwound to be soft and pliable (will not decrease scar tissue or contracture), help dry overly moist wound bed. Abd pads for absorption of drainage.     Rationale for changes in Plan of Care: Honey hydrocolloid cleaned up wound beds, now provides too much moisture. Yellow petrolatum gauze will help decrease overly moist wound bed, but not allow wound beds to get too dry.    Factors affecting wound healing: Trauma, " memory loss leading to patient not seeking wound care in a timely manner.  Goals: manage drainage, maintain optimal moisture needed for wound healing, encourage granulation tissue development.    NURSING PLAN OF CARE:    Dressing changes: Continue previous Dressing Care orders:        See new Dressing Care orders:  X     Skin care: See Skin Care orders:        Rectal tube care: See Rectal Tube Care orders:      Other orders:           WOUND TEAM PLAN OF CARE (X):   NPWT change 3 x week:        Dressing changes:       Follow up as needed:     X  Other:

## 2018-08-26 NOTE — CARE PLAN
Problem: Safety  Goal: Will remain free from falls  Outcome: PROGRESSING AS EXPECTED  Fall precautions in place, pt reminded to call for assistance to the bathroom.    Problem: Psychosocial Needs:  Goal: Level of anxiety will decrease  Outcome: PROGRESSING SLOWER THAN EXPECTED  Pt encouraged to verbalize feelings and express concerns.

## 2018-08-26 NOTE — DISCHARGE PLANNING
Anticipated Discharge Disposition: SNF    Action: MARTHAW completed a chart review. It was discovered a referral was not sent for SNF. LSW met with pt at bedside and explained SNF. Pt chose [facility], [facility], [facility] and [facility] per contract with his insurance.     Barriers to Discharge: None    Plan: LSW amd RN CM to monitor for accepting SNF's.

## 2018-08-27 LAB
ANION GAP SERPL CALC-SCNC: 7 MMOL/L (ref 0–11.9)
ANISOCYTOSIS BLD QL SMEAR: ABNORMAL
BACTERIA BLD CULT: NORMAL
BASOPHILS # BLD AUTO: 0 % (ref 0–1.8)
BASOPHILS # BLD: 0 K/UL (ref 0–0.12)
BUN SERPL-MCNC: 14 MG/DL (ref 8–22)
CALCIUM SERPL-MCNC: 9.1 MG/DL (ref 8.5–10.5)
CHLORIDE SERPL-SCNC: 101 MMOL/L (ref 96–112)
CO2 SERPL-SCNC: 29 MMOL/L (ref 20–33)
CREAT SERPL-MCNC: 0.87 MG/DL (ref 0.5–1.4)
EOSINOPHIL # BLD AUTO: 0.13 K/UL (ref 0–0.51)
EOSINOPHIL NFR BLD: 1.8 % (ref 0–6.9)
ERYTHROCYTE [DISTWIDTH] IN BLOOD BY AUTOMATED COUNT: 52.3 FL (ref 35.9–50)
GLUCOSE SERPL-MCNC: 103 MG/DL (ref 65–99)
HCT VFR BLD AUTO: 33.4 % (ref 42–52)
HGB BLD-MCNC: 10 G/DL (ref 14–18)
LYMPHOCYTES # BLD AUTO: 1.53 K/UL (ref 1–4.8)
LYMPHOCYTES NFR BLD: 21.2 % (ref 22–41)
MANUAL DIFF BLD: NORMAL
MCH RBC QN AUTO: 24.9 PG (ref 27–33)
MCHC RBC AUTO-ENTMCNC: 29.9 G/DL (ref 33.7–35.3)
MCV RBC AUTO: 83.1 FL (ref 81.4–97.8)
MICROCYTES BLD QL SMEAR: ABNORMAL
MONOCYTES # BLD AUTO: 0.89 K/UL (ref 0–0.85)
MONOCYTES NFR BLD AUTO: 12.4 % (ref 0–13.4)
MORPHOLOGY BLD-IMP: NORMAL
NEUTROPHILS # BLD AUTO: 4.65 K/UL (ref 1.82–7.42)
NEUTROPHILS NFR BLD: 64.6 % (ref 44–72)
NRBC # BLD AUTO: 0 K/UL
NRBC BLD-RTO: 0 /100 WBC
OVALOCYTES BLD QL SMEAR: NORMAL
PLATELET # BLD AUTO: 391 K/UL (ref 164–446)
PLATELET BLD QL SMEAR: NORMAL
PMV BLD AUTO: 9 FL (ref 9–12.9)
POIKILOCYTOSIS BLD QL SMEAR: NORMAL
POTASSIUM SERPL-SCNC: 4.3 MMOL/L (ref 3.6–5.5)
RBC # BLD AUTO: 4.02 M/UL (ref 4.7–6.1)
RBC BLD AUTO: PRESENT
SIGNIFICANT IND 70042: NORMAL
SITE SITE: NORMAL
SODIUM SERPL-SCNC: 137 MMOL/L (ref 135–145)
SOURCE SOURCE: NORMAL
WBC # BLD AUTO: 7.2 K/UL (ref 4.8–10.8)

## 2018-08-27 PROCEDURE — 36415 COLL VENOUS BLD VENIPUNCTURE: CPT

## 2018-08-27 PROCEDURE — 80048 BASIC METABOLIC PNL TOTAL CA: CPT

## 2018-08-27 PROCEDURE — 85027 COMPLETE CBC AUTOMATED: CPT

## 2018-08-27 PROCEDURE — 700111 HCHG RX REV CODE 636 W/ 250 OVERRIDE (IP): Performed by: HOSPITALIST

## 2018-08-27 PROCEDURE — G9169 MEMORY GOAL STATUS: HCPCS | Mod: CK

## 2018-08-27 PROCEDURE — G9168 MEMORY CURRENT STATUS: HCPCS | Mod: CL

## 2018-08-27 PROCEDURE — 700102 HCHG RX REV CODE 250 W/ 637 OVERRIDE(OP): Performed by: FAMILY MEDICINE

## 2018-08-27 PROCEDURE — 770006 HCHG ROOM/CARE - MED/SURG/GYN SEMI*

## 2018-08-27 PROCEDURE — A9270 NON-COVERED ITEM OR SERVICE: HCPCS | Performed by: FAMILY MEDICINE

## 2018-08-27 PROCEDURE — 92523 SPEECH SOUND LANG COMPREHEN: CPT

## 2018-08-27 PROCEDURE — 85007 BL SMEAR W/DIFF WBC COUNT: CPT

## 2018-08-27 PROCEDURE — 99232 SBSQ HOSP IP/OBS MODERATE 35: CPT | Performed by: FAMILY MEDICINE

## 2018-08-27 RX ORDER — TAMSULOSIN HYDROCHLORIDE 0.4 MG/1
0.4 CAPSULE ORAL
Status: DISCONTINUED | OUTPATIENT
Start: 2018-08-27 | End: 2018-08-28 | Stop reason: HOSPADM

## 2018-08-27 RX ADMIN — MAGNESIUM GLUCONATE 500 MG ORAL TABLET 400 MG: 500 TABLET ORAL at 16:08

## 2018-08-27 RX ADMIN — OXYCODONE HYDROCHLORIDE 10 MG: 5 TABLET ORAL at 23:21

## 2018-08-27 RX ADMIN — AMOXICILLIN AND CLAVULANATE POTASSIUM 1 TABLET: 875; 125 TABLET, FILM COATED ORAL at 16:08

## 2018-08-27 RX ADMIN — AMOXICILLIN AND CLAVULANATE POTASSIUM 1 TABLET: 875; 125 TABLET, FILM COATED ORAL at 05:24

## 2018-08-27 RX ADMIN — OXYCODONE HYDROCHLORIDE 5 MG: 5 TABLET ORAL at 05:27

## 2018-08-27 RX ADMIN — OXYCODONE HYDROCHLORIDE 5 MG: 5 TABLET ORAL at 16:11

## 2018-08-27 RX ADMIN — ENOXAPARIN SODIUM 40 MG: 100 INJECTION SUBCUTANEOUS at 05:23

## 2018-08-27 RX ADMIN — TAMSULOSIN HYDROCHLORIDE 0.4 MG: 0.4 CAPSULE ORAL at 08:13

## 2018-08-27 RX ADMIN — MAGNESIUM GLUCONATE 500 MG ORAL TABLET 400 MG: 500 TABLET ORAL at 05:24

## 2018-08-27 ASSESSMENT — ENCOUNTER SYMPTOMS
SHORTNESS OF BREATH: 0
BLURRED VISION: 0
NAUSEA: 0
HALLUCINATIONS: 0
WEAKNESS: 0
HEADACHES: 0
COUGH: 0
BACK PAIN: 0
NERVOUS/ANXIOUS: 0
HEARTBURN: 0
DIZZINESS: 0
MEMORY LOSS: 1
VOMITING: 0
ABDOMINAL PAIN: 0
WHEEZING: 0
CHILLS: 0
DIARRHEA: 0
SORE THROAT: 0
FEVER: 0

## 2018-08-27 ASSESSMENT — PAIN SCALES - GENERAL
PAINLEVEL_OUTOF10: 6
PAINLEVEL_OUTOF10: 0
PAINLEVEL_OUTOF10: 3

## 2018-08-27 NOTE — CARE PLAN
Problem: Infection  Goal: Will remain free from infection  Outcome: PROGRESSING AS EXPECTED  No s/s of fever or inflammation noted. Pt receiving oral antibiotics at this time.    Problem: Medication  Goal: Compliance with prescribed medication will improve  Outcome: PROGRESSING AS EXPECTED  Educated Pt on importance of taking medication as scheduled as well as common side effects.

## 2018-08-27 NOTE — DISCHARGE PLANNING
Javi with [employer] called RN CM and states she will call [facility] and give them authorization for pt's admission.

## 2018-08-27 NOTE — PROGRESS NOTES
[facility] Progress Note    Date of Service: 2018    Chief Complaint  62 y.o. male admitted 2018 with chronic open wound of the left leg, cellulitis of the involved area, urinary retention with chronic Mcleod catheter, memory impairment.    Interval Problem Update  Open wound-chronic, per wound care seems to be improving  Cellulitis of left leg- resolving  Memory impairment-cognitive evaluation pending  Left knee flexion contracture-will need PT and OT evaluation    Consultants/Specialty  None    Disposition  SNF        Review of Systems   Constitutional: Negative for chills, fever and malaise/fatigue.   HENT: Negative for hearing loss and sore throat.    Eyes: Negative for blurred vision.   Respiratory: Negative for cough, shortness of breath and wheezing.    Cardiovascular: Negative for chest pain and leg swelling.   Gastrointestinal: Negative for abdominal pain, diarrhea, heartburn, nausea and vomiting.   Genitourinary: Negative for dysuria.   Musculoskeletal: Negative for back pain and joint pain.   Skin: Negative for rash.   Neurological: Negative for dizziness, weakness and headaches.   Psychiatric/Behavioral: Positive for memory loss. Negative for hallucinations. The patient is not nervous/anxious.       Physical Exam  Laboratory/Imaging   Hemodynamics  Temp (24hrs), Av °C (98.6 °F), Min:36.8 °C (98.3 °F), Max:37.2 °C (98.9 °F)   Temperature: 37 °C (98.6 °F)  Pulse  Av.6  Min: 62  Max: 99    Blood Pressure: 121/72      Respiratory      Respiration: 16, Pulse Oximetry: 96 %             Fluids    Intake/Output Summary (Last 24 hours) at 18 1048  Last data filed at 18 0600   Gross per 24 hour   Intake              300 ml   Output             2000 ml   Net            -1700 ml       Nutrition  Orders Placed This Encounter   Procedures   • Diet Order Regular     Standing Status:   Standing     Number of Occurrences:   1     Order Specific Question:   Diet:     Answer:   Regular  [1]     Physical Exam   Constitutional: He is oriented to person, place, and time. He appears well-developed and well-nourished.   HENT:   Head: Normocephalic and atraumatic.   Eyes: Pupils are equal, round, and reactive to light. Conjunctivae are normal.   Neck: No tracheal deviation present. No thyromegaly present.   Cardiovascular: Normal rate and regular rhythm.    Pulmonary/Chest: Effort normal and breath sounds normal.   Abdominal: Soft. Bowel sounds are normal. He exhibits no distension. There is no tenderness.   Musculoskeletal: He exhibits no edema.   Extensive wound involving the medial posterior and lateral aspects of the left knee and leg  Left knee flexion contracture   Lymphadenopathy:     He has no cervical adenopathy.   Neurological: He is alert and oriented to person, place, and time.   Nursing note and vitals reviewed.      Recent Labs      08/25/18   0753  08/26/18   0040  08/27/18   0015   WBC  6.4  6.4  7.2   RBC  4.25*  3.83*  4.02*   HEMOGLOBIN  10.8*  10.0*  10.0*   HEMATOCRIT  34.8*  31.1*  33.4*   MCV  80.7*  81.2*  83.1   MCH  25.2*  26.1*  24.9*   MCHC  31.2*  32.2*  29.9*   RDW  49.1  50.4*  52.3*   PLATELETCT  418  341  391   MPV  9.3  9.1  9.0     Recent Labs      08/25/18   0753  08/26/18   0040  08/27/18   0015   SODIUM  139  140  137   POTASSIUM  4.1  4.4  4.3   CHLORIDE  104  106  101   CO2  29  28  29   GLUCOSE  102*  118*  103*   BUN  13  16  14   CREATININE  0.85  0.85  0.87   CALCIUM  9.1  8.4*  9.1                      Assessment/Plan     * Cellulitis of left lower extremity- (present on admission)   Assessment & Plan    Augmentin x 7 days          Open wound of left lower leg- (present on admission)   Assessment & Plan    Chronic  Wound care          Hypomagnesemia- (present on admission)   Assessment & Plan    oral magnesium        Hypokalemia- (present on admission)   Assessment & Plan    follow bmp          Urinary retention- (present on admission)   Assessment & Plan     Continue Mcleod for now  Flomax        Pyuria- (present on admission)   Assessment & Plan    Follow culture        Flexion contracture of knee, 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)   Assessment & Plan    PT and OT evaluation        Cognitive impairment- (present on admission)   Assessment & Plan    Cognitive evaluation pending          Microcytic anemia- (present on admission)   Assessment & Plan    Follow CBC            Tobacco use- (present on admission)   Assessment & Plan    Nicotine replacement protocol        Alcoholism in remission (HCC)- (present on admission)   Assessment & Plan    counseling          Quality-Core Measures   Reviewed items::  Radiology images reviewed, Labs reviewed and Medications reviewed  Mcleod catheter::  Urinary Tract Retention or Urinary Tract Obstruction  DVT prophylaxis pharmacological::  Enoxaparin (Lovenox)  Ulcer Prophylaxis::  No  Antibiotics:  Treating active infection/contamination beyond 24 hours perioperative coverage

## 2018-08-27 NOTE — PROGRESS NOTES
This RN assumed care of the pt at 0700. Pt is resting quietly in bed with no needs at this time. Discharge plan discussed with pt, awaiting placement to SNF. Pt verbalizes understanding. Call light within reach. Bed locked in lowest position. Hourly rounding in place. Will monitor.

## 2018-08-27 NOTE — CARE PLAN
Problem: Communication  Goal: The ability to communicate needs accurately and effectively will improve  Outcome: PROGRESSING AS EXPECTED  POC reviewed with pt. Awaiting placement to SNF for further healing of LLE wound prior to discharge to previous circumstances. Pt is homeless. Pt verbalizes understanding to POC.     Problem: Infection  Goal: Will remain free from infection  Outcome: PROGRESSING AS EXPECTED  Labs and vitals stable.

## 2018-08-27 NOTE — THERAPY
"Speech Language Therapy Evaluation completed to address speech, communication and cognition  Functional Status:  Cognitive-linguistic evaluation completed today using portions of standardized tests (Cognistat, Ross Information Processing Assessment), informal assessment, and observation. Patient is awake and alert and Ox4. He presents with symptoms of moderate-severe cognitive deficits. Specifically, immediate and short memory, safety awareness, insight, and problem solving are impaired. For example, patient has had chronic Mcleod catheter, but kept jumping out of bed saying \"I need to piss.\" He also stated \"I did a lot of cocaine. I can't remember sometimes.\" Then 5 minutes later, he stated \"I don't know why the doctor ordered a cognitive test, my memory is fine.\" Verbal expression, auditory comprehension, reading, writing, and motor speech appear WNL.  Recommendations:   Recommend 24 hour supervision. Patient politely declined therapy for memory, saying \"I'm fine. I don't want that.\" Its difficult to know if patient is at baseline or not. Recommend 2 week trial of speech therapy for cognition.    Plan of Care: Will benefit from Speech Therapy 2 times per week  Post-Acute Therapy: Discharge to a transitional care facility for continued skilled therapy services.    See \"Rehab Therapy-Acute\" Patient Summary Report for complete documentation.   "

## 2018-08-27 NOTE — DISCHARGE PLANNING
Anticipated Discharge Disposition: SNF for skilled therapies: wound care, PT/OT, then home.    Action: RN CM  Notified MD and pt that [facility] has accepted pt. Danielle at [facility], [phone], notified as well. Pt concerned [facility] on [address] states he ordered a new debit card and it should have arrived by now.    Barriers to Discharge: None identified at this time.    Plan: RN CM continue to assist with dc plan to snf.

## 2018-08-28 ENCOUNTER — PATIENT OUTREACH (OUTPATIENT)
Dept: HEALTH INFORMATION MANAGEMENT | Facility: OTHER | Age: 63
End: 2018-08-28

## 2018-08-28 VITALS
SYSTOLIC BLOOD PRESSURE: 114 MMHG | HEART RATE: 77 BPM | OXYGEN SATURATION: 96 % | TEMPERATURE: 98.4 F | HEIGHT: 71 IN | DIASTOLIC BLOOD PRESSURE: 75 MMHG | WEIGHT: 148.37 LBS | BODY MASS INDEX: 20.77 KG/M2 | RESPIRATION RATE: 16 BRPM

## 2018-08-28 PROBLEM — E83.42 HYPOMAGNESEMIA: Status: RESOLVED | Noted: 2018-08-24 | Resolved: 2018-08-28

## 2018-08-28 PROBLEM — R82.81 PYURIA: Status: RESOLVED | Noted: 2018-08-23 | Resolved: 2018-08-28

## 2018-08-28 PROBLEM — E87.6 HYPOKALEMIA: Status: RESOLVED | Noted: 2018-08-23 | Resolved: 2018-08-28

## 2018-08-28 LAB
ANION GAP SERPL CALC-SCNC: 7 MMOL/L (ref 0–11.9)
BASOPHILS # BLD AUTO: 1 % (ref 0–1.8)
BASOPHILS # BLD: 0.06 K/UL (ref 0–0.12)
BUN SERPL-MCNC: 19 MG/DL (ref 8–22)
CALCIUM SERPL-MCNC: 8.6 MG/DL (ref 8.5–10.5)
CHLORIDE SERPL-SCNC: 101 MMOL/L (ref 96–112)
CO2 SERPL-SCNC: 27 MMOL/L (ref 20–33)
CREAT SERPL-MCNC: 0.86 MG/DL (ref 0.5–1.4)
EOSINOPHIL # BLD AUTO: 0.12 K/UL (ref 0–0.51)
EOSINOPHIL NFR BLD: 2 % (ref 0–6.9)
ERYTHROCYTE [DISTWIDTH] IN BLOOD BY AUTOMATED COUNT: 50.2 FL (ref 35.9–50)
GLUCOSE SERPL-MCNC: 103 MG/DL (ref 65–99)
HCT VFR BLD AUTO: 32.5 % (ref 42–52)
HGB BLD-MCNC: 10.4 G/DL (ref 14–18)
IMM GRANULOCYTES # BLD AUTO: 0.05 K/UL (ref 0–0.11)
IMM GRANULOCYTES NFR BLD AUTO: 0.8 % (ref 0–0.9)
LYMPHOCYTES # BLD AUTO: 1.88 K/UL (ref 1–4.8)
LYMPHOCYTES NFR BLD: 31 % (ref 22–41)
MCH RBC QN AUTO: 25.8 PG (ref 27–33)
MCHC RBC AUTO-ENTMCNC: 32 G/DL (ref 33.7–35.3)
MCV RBC AUTO: 80.6 FL (ref 81.4–97.8)
MONOCYTES # BLD AUTO: 1.06 K/UL (ref 0–0.85)
MONOCYTES NFR BLD AUTO: 17.5 % (ref 0–13.4)
NEUTROPHILS # BLD AUTO: 2.9 K/UL (ref 1.82–7.42)
NEUTROPHILS NFR BLD: 47.7 % (ref 44–72)
NRBC # BLD AUTO: 0 K/UL
NRBC BLD-RTO: 0 /100 WBC
PLATELET # BLD AUTO: 380 K/UL (ref 164–446)
PMV BLD AUTO: 9.3 FL (ref 9–12.9)
POTASSIUM SERPL-SCNC: 4.1 MMOL/L (ref 3.6–5.5)
RBC # BLD AUTO: 4.03 M/UL (ref 4.7–6.1)
SODIUM SERPL-SCNC: 135 MMOL/L (ref 135–145)
WBC # BLD AUTO: 6.1 K/UL (ref 4.8–10.8)

## 2018-08-28 PROCEDURE — G8979 MOBILITY GOAL STATUS: HCPCS | Mod: CI

## 2018-08-28 PROCEDURE — 99239 HOSP IP/OBS DSCHRG MGMT >30: CPT | Performed by: HOSPITALIST

## 2018-08-28 PROCEDURE — G8987 SELF CARE CURRENT STATUS: HCPCS | Mod: CI

## 2018-08-28 PROCEDURE — 700102 HCHG RX REV CODE 250 W/ 637 OVERRIDE(OP): Performed by: FAMILY MEDICINE

## 2018-08-28 PROCEDURE — 36415 COLL VENOUS BLD VENIPUNCTURE: CPT

## 2018-08-28 PROCEDURE — G8988 SELF CARE GOAL STATUS: HCPCS | Mod: CI

## 2018-08-28 PROCEDURE — A9270 NON-COVERED ITEM OR SERVICE: HCPCS | Performed by: FAMILY MEDICINE

## 2018-08-28 PROCEDURE — G8989 SELF CARE D/C STATUS: HCPCS | Mod: CI

## 2018-08-28 PROCEDURE — 80048 BASIC METABOLIC PNL TOTAL CA: CPT

## 2018-08-28 PROCEDURE — G8978 MOBILITY CURRENT STATUS: HCPCS | Mod: CI

## 2018-08-28 PROCEDURE — G8980 MOBILITY D/C STATUS: HCPCS | Mod: CI

## 2018-08-28 PROCEDURE — 97165 OT EVAL LOW COMPLEX 30 MIN: CPT

## 2018-08-28 PROCEDURE — 700111 HCHG RX REV CODE 636 W/ 250 OVERRIDE (IP): Performed by: HOSPITALIST

## 2018-08-28 PROCEDURE — 97162 PT EVAL MOD COMPLEX 30 MIN: CPT

## 2018-08-28 PROCEDURE — 85025 COMPLETE CBC W/AUTO DIFF WBC: CPT

## 2018-08-28 RX ORDER — NICOTINE 21 MG/24HR
1 PATCH, TRANSDERMAL 24 HOURS TRANSDERMAL EVERY 24 HOURS
Qty: 30 PATCH
Start: 2018-08-28 | End: 2018-10-24

## 2018-08-28 RX ORDER — POLYETHYLENE GLYCOL 3350 17 G/17G
17 POWDER, FOR SOLUTION ORAL
Refills: 3
Start: 2018-08-28 | End: 2018-10-24

## 2018-08-28 RX ORDER — AMOXICILLIN 250 MG
2 CAPSULE ORAL 2 TIMES DAILY
Qty: 30 TAB | Refills: 0
Start: 2018-08-28 | End: 2018-10-24

## 2018-08-28 RX ORDER — TAMSULOSIN HYDROCHLORIDE 0.4 MG/1
0.4 CAPSULE ORAL
Qty: 30 CAP
Start: 2018-08-29 | End: 2018-10-24

## 2018-08-28 RX ORDER — AMOXICILLIN AND CLAVULANATE POTASSIUM 875; 125 MG/1; MG/1
1 TABLET, FILM COATED ORAL EVERY 12 HOURS
Refills: 0
Start: 2018-08-28 | End: 2018-08-30

## 2018-08-28 RX ORDER — ONDANSETRON 4 MG/1
4 TABLET, ORALLY DISINTEGRATING ORAL EVERY 4 HOURS PRN
Qty: 10 TAB | Refills: 0
Start: 2018-08-28 | End: 2018-10-24

## 2018-08-28 RX ORDER — ACETAMINOPHEN 325 MG/1
650 TABLET ORAL EVERY 6 HOURS PRN
Qty: 30 TAB | Refills: 0
Start: 2018-08-28 | End: 2018-10-24

## 2018-08-28 RX ORDER — TRAMADOL HYDROCHLORIDE 50 MG/1
50 TABLET ORAL EVERY 6 HOURS PRN
Qty: 15 TAB | Refills: 0 | Status: SHIPPED | OUTPATIENT
Start: 2018-08-28 | End: 2018-08-31

## 2018-08-28 RX ADMIN — TAMSULOSIN HYDROCHLORIDE 0.4 MG: 0.4 CAPSULE ORAL at 08:17

## 2018-08-28 RX ADMIN — ENOXAPARIN SODIUM 40 MG: 100 INJECTION SUBCUTANEOUS at 06:00

## 2018-08-28 RX ADMIN — AMOXICILLIN AND CLAVULANATE POTASSIUM 1 TABLET: 875; 125 TABLET, FILM COATED ORAL at 06:00

## 2018-08-28 RX ADMIN — MAGNESIUM GLUCONATE 500 MG ORAL TABLET 400 MG: 500 TABLET ORAL at 06:00

## 2018-08-28 ASSESSMENT — COGNITIVE AND FUNCTIONAL STATUS - GENERAL
MOBILITY SCORE: 24
SUGGESTED CMS G CODE MODIFIER MOBILITY: CH
DAILY ACTIVITIY SCORE: 23
SUGGESTED CMS G CODE MODIFIER DAILY ACTIVITY: CI
HELP NEEDED FOR BATHING: A LITTLE

## 2018-08-28 ASSESSMENT — GAIT ASSESSMENTS
DISTANCE (FEET): 250
GAIT LEVEL OF ASSIST: SUPERVISED

## 2018-08-28 ASSESSMENT — PAIN SCALES - GENERAL: PAINLEVEL_OUTOF10: 0

## 2018-08-28 ASSESSMENT — ACTIVITIES OF DAILY LIVING (ADL): TOILETING: INDEPENDENT

## 2018-08-28 NOTE — DISCHARGE PLANNING
Agency/Facility Name: [facility]   Spoke To: Kala  Outcome: Patient declined due to behaviors and prior drug use.   YASMIN Moreno notified.

## 2018-08-28 NOTE — THERAPY
"Physical Therapy Evaluation completed.   Bed Mobility:  Supine to Sit: Supervised  Transfers: Sit to Stand: Supervised  Gait: Level Of Assist: Supervised with No Equipment Needed       Plan of Care: Patient with no further skilled PT needs in the acute care setting at this time  Discharge Recommendations: Equipment: No Equipment Needed.    Pt is homeless. He apparently had a fall two months ago, sustaining wounds to his LLE. He did not f/u w/ care. Hx of ETOH. Today, he presents w/ limitation of full left knee extension by about 5 degrees. Mobility is not an issue. Pt is able to get in/out of bed, stand and ambulate w/o AD. No loss of balance. Educated pt regarding two stretching excercises, one in standing and one sitting. Pt demonstrates ability to perform correctly. No PT needs.    See \"Rehab Therapy-Acute\" Patient Summary Report for complete documentation.     "

## 2018-08-28 NOTE — DISCHARGE PLANNING
Anticipated Discharge Disposition: [facility].    Action: EKATERINA YANES notified Dr. Ellis that pt has been accepted at [facility] and they can take him today. Nursing staff notified. Transportation communication form faxed to [facility] at ext. [phone].    Barriers to Discharge: None identified at this time.    Plan: RN CM to finish transfer packet and notify pt and staff of transport time when obtained from [facility].

## 2018-08-28 NOTE — DISCHARGE PLANNING
Agency/Facility Name: [facility]  Spoke To: Kye  Outcome: Transportation scheduled 8/28 at 1730 via [facility].     YASMIN Moreno notified.

## 2018-08-28 NOTE — DISCHARGE PLANNING
Agency/Facility Name: [facility]   Spoke To: Kye  Outcome: Patient accepted. RNCM informed of patient's acceptance.

## 2018-08-28 NOTE — PROGRESS NOTES
Pt became very anxious overnight, hitting head with his hands and screaming profanities. Attempted to reorient Pt multiple times specifically about adams catheter being in place. Pt verbalized multiple time that he just wanted to urinate, explained to pt that adams is in place for that reason several times. Pt eventually calmed down and went to sleep.

## 2018-08-28 NOTE — DISCHARGE PLANNING
Anticipated Discharge Disposition: TBD    Action: Nurse Manager requested LSW to contact homeless shelter. Pt insisting his belongings are currently at the shelter. LSW called [facility] and there is no record of pt using services. Nurse Manager informed

## 2018-08-28 NOTE — CARE PLAN
Problem: Infection  Goal: Will remain free from infection  Outcome: PROGRESSING AS EXPECTED  No s/s of inflammation at incision site, no fever noted in Pt. Pt encouraged to cough and deep breathe every hour while awake.    Problem: Psychosocial Needs:  Goal: Level of anxiety will decrease  Outcome: PROGRESSING AS EXPECTED  Pt encouraged to verbalize feelings and fears and reinforced anti-anxiety/diversion techniques

## 2018-08-28 NOTE — PROGRESS NOTES
AVS signed by patient and reviewed. COBRA packet signed and ready with Tramadol rx inside. Copy of AVS sent with patient to [facility] in [place]. IV d/c'd and site WNL. Changed adams from standard bag to leg bag per pt preference. Awaiting transport to pick patient up at 1730.    Report called to RN at [facility]. Gave personal extension for call back if needed.

## 2018-08-28 NOTE — DISCHARGE SUMMARY
"Discharge Summary    CHIEF COMPLAINT ON ADMISSION  Chief Complaint   Patient presents with   • Wound Check     Pateint has large wound on left leg. Wound is 23-24 inches on inside of left leg to outside of left leg.       Reason for Admission  Leg Pain     CODE STATUS  Full Code    HPI & HOSPITAL COURSE  This is a 62 y.o. male here with leg pain. He states that he was doing well until he had a fall during a hike, while he was \"waiting for the liquor store to open\". He was hoping it would heal on its own but then began having difficulty walking secondary to pain. He was admitted for a UTI, LE wound and surrounding cellulitis. He was started empirically on IV unasyn and vancomycin for 2 days then switched to Augmentin to finish his course. Blood cultures were negative at 5 days. He was afebrile, without leukocytosis and lactic acid was normal. He was seen and evaluated by wound care with good healing throughout.    Of note, he did present on 8/6 to the ED for urinary retention where a adams was placed and he was scheduled for outpatient follow up. Unfortunately, he presented again on 8/22 for the above complaints. Adams was exchanged and his tamsulosin was continued. I discussed with the ER  who is calling [facility] to reschedule his missed outpatient follow up appointment.      During his hospitalization, there was concern for his ability to make decisions and the amount of supervision he would need. SLP performed a cognitive evaluation and recommended 24 hour supervision secondary to his cognitive impairment.        Therefore, he is discharged in fair and stable condition to skilled nursing facility.    The patient met 2-midnight criteria for an inpatient stay at the time of discharge.      FOLLOW UP ITEMS POST DISCHARGE  Follow up with urology for chronic indwelling adams. Presented to ED with a leg bag that appears to have been placed on 8/6.    DISCHARGE DIAGNOSES  Principal Problem:    Cellulitis " of left lower extremity POA: Yes  Active Problems:    Open wound of left lower leg POA: Yes    Microcytic anemia POA: Yes    Cognitive impairment POA: Yes    Flexion contracture of knee, left POA: Yes    Urinary retention POA: Yes    Alcoholism in remission (HCC) POA: Yes    Tobacco use POA: Yes  Resolved Problems:    Pyuria POA: Yes    Hypokalemia POA: Yes    Hypomagnesemia POA: Yes      FOLLOW UP  [facility] ([facility])  [address]  [facility] [address]  [phone]        [facility] [place] [address] [address]  [place] [address]  [phone]  In 1 week        MEDICATIONS ON DISCHARGE     Medication List      START taking these medications      Instructions   acetaminophen 325 MG Tabs  Commonly known as:  TYLENOL   Take 2 Tabs by mouth every 6 hours as needed for Mild Pain, Moderate Pain or Fever.  Dose:  650 mg     amoxicillin-clavulanate 875-125 MG Tabs  Commonly known as:  AUGMENTIN   Take 1 Tab by mouth every 12 hours for 2 days.  Dose:  1 Tab     magnesium oxide 400 (241.3 Mg) MG Tabs tablet  Commonly known as:  MAG-OX   Take 1 Tab by mouth 2 Times a Day.  Dose:  400 mg     nicotine 14 MG/24HR Pt24  Commonly known as:  NICODERM   Apply 1 Patch to skin as directed every 24 hours.  Dose:  1 Patch     nicotine polacrilex 2 MG Gum  Commonly known as:  NICORETTE   Take 1 Each by mouth every 1 hour as needed for Smoking Cessation (For nicotine urge).  Dose:  2 mg     ondansetron 4 MG Tbdp  Commonly known as:  ZOFRAN ODT   Take 1 Tab by mouth every four hours as needed (give PO if no IV route available).  Dose:  4 mg     polyethylene glycol/lytes Pack  Commonly known as:  MIRALAX   Take 1 Packet by mouth 1 time daily as needed (if sennosides and docusate ineffective after 24 hours).  Dose:  17 g     senna-docusate 8.6-50 MG Tabs  Commonly known as:  PERICOLACE or SENOKOT S   Take 2 Tabs by mouth 2 Times a Day.  Dose:  2 Tab     tamsulosin 0.4 MG capsule  Commonly known as:  FLOMAX    Take 1 Cap by mouth ONE-HALF HOUR AFTER BREAKFAST.  Dose:  0.4 mg     tramadol 50 MG Tabs  Commonly known as:  ULTRAM   Take 1 Tab by mouth every 6 hours as needed for Severe Pain for up to 3 days.  Dose:  50 mg            Allergies  No Known Allergies    DIET  Orders Placed This Encounter   Procedures   • Diet Order Regular     Standing Status:   Standing     Number of Occurrences:   1     Order Specific Question:   Diet:     Answer:   Regular [1]       ACTIVITY  As tolerated and directed by skilled nursing.  Weight bearing as tolerated    LINES, DRAINS, AND WOUNDS  This is an automated list. Peripheral IVs will be removed prior to discharge.  PIV Group Left Forearm 22g Saline Lock (Active)   Line Secured Transparent;Taped 8/28/2018  8:25 AM   Site Condition / Description Assessed;Clean;Dry;Intact 8/28/2018  8:25 AM   Dressing Type / Description Clean;Dry;Intact;Transparent 8/28/2018  8:25 AM   Dressing Status Observed 8/28/2018  8:25 AM   Saline Locked Yes 8/28/2018  8:25 AM   Infiltration Grading Used by [facility] 0 8/28/2018  8:25 AM   Phlebitis Scale (Used by [facility]) 0 8/28/2018  8:25 AM     Urinary Catheter Indwelling Catheter 16 (Active)   Catheter State Cobden 8/28/2018 11:00 AM   Skin Integrity Intact 8/28/2018 11:00 AM   Catheter Secured Yes 8/28/2018 11:00 AM   Collection Device Changed Yes 8/28/2018 11:00 AM   Output (mL) 450 mL 8/28/2018 11:00 AM      Wound POA Abrasion Leg Left Posterior (Active)       Wound POA Other (comment) Leg Left Anterior;Lateral;Posterior;Medial (Active)   Wound Bed Pink;Red 8/28/2018  8:25 AM   Drainage  None 8/28/2018  8:25 AM   Periwound Skin Pink;Red 8/28/2018  8:25 AM   Cleansing Normal Saline Irrigation 8/28/2018  8:25 AM   Dressing Options Nonadherent;Absorbent Abdominal Pad 8/28/2018  8:25 AM   Dressing Status / Change Clean;Dry;Intact;Reinforced 8/28/2018  8:25 AM   Dressing Cleansing/Solutions Not Applicable 8/28/2018  8:25 AM   Periwound Protectant Skin  Protectant wipes to Periwound 8/28/2018  8:25 AM   Length (cm) 30 8/25/2018  4:00 PM   Width (cm) 11.2 8/25/2018  4:00 PM   Depth (cm) 0.2 8/25/2018  4:00 PM   Weekly Photo (Inpatient Only) 08/25/18 8/26/2018  8:30 PM   Dressing Change Frequency Every 48 hrs 8/28/2018  8:25 AM   Next Dressing Change  08/29/18 8/28/2018  8:25 AM   Time Spent with Patient (mins) 60 8/25/2018  4:00 PM              Urinary Catheter Indwelling Catheter 16 (Active)   Catheter State Arnold 8/28/2018 11:00 AM   Skin Integrity Intact 8/28/2018 11:00 AM   Catheter Secured Yes 8/28/2018 11:00 AM   Collection Device Changed Yes 8/28/2018 11:00 AM   Output (mL) 450 mL 8/28/2018 11:00 AM        MENTAL STATUS ON TRANSFER  Level of Consciousness: Alert  Orientation : Oriented x 4  Speech: Speech Clear    CONSULTATIONS  None    PROCEDURES  CT-HEAD W/O   Final Result         NO ACUTE ABNORMALITIES ARE NOTED ON CT SCAN OF THE HEAD.      Findings are consistent with atrophy.  Decreased attenuation in the periventricular white matter likely indicates microvascular ischemic disease.      CT-EXTREMITY, LOWER WITH LEFT   Final Result         1.  Evaluation limited due to motion artifacts at the level of the knee, otherwise the left lower extremity arterial tree appears grossly intact. Scattered atherosclerotic calcifications are seen without significant stenosis identified.   2.  Skin thickening posterior lateral left lower thigh, could represent cellulitis.   3.  Small left fat-containing inguinal hernia   4.  Soft tissue edema below the knee      DX-CHEST-2 VIEWS   Final Result         1.  No acute cardiopulmonary disease.   2.  Atherosclerosis            LABORATORY  Lab Results   Component Value Date    SODIUM 135 08/28/2018    POTASSIUM 4.1 08/28/2018    CHLORIDE 101 08/28/2018    CO2 27 08/28/2018    GLUCOSE 103 (H) 08/28/2018    BUN 19 08/28/2018    CREATININE 0.86 08/28/2018        Lab Results   Component Value Date    WBC 6.1 08/28/2018     HEMOGLOBIN 10.4 (L) 08/28/2018    HEMATOCRIT 32.5 (L) 08/28/2018    PLATELETCT 380 08/28/2018        Total time of the discharge process exceeds 39 minutes.

## 2018-08-28 NOTE — DISCHARGE INSTRUCTIONS
Discharge Instructions    Discharged to other by medical transportation with escort. Discharged via wheelchair, hospital escort: Yes.  Special equipment needed: Not Applicable    Be sure to schedule a follow-up appointment with your primary care doctor or any specialists as instructed.     Discharge Plan:   Smoking Cessation Offered: Patient Refused  Pneumococcal Vaccine Administered/Refused: Given (See MAR)  Influenza Vaccine Indication: Not indicated: Previously immunized this influenza season and > 8 years of age    I understand that a diet low in cholesterol, fat, and sodium is recommended for good health. Unless I have been given specific instructions below for another diet, I accept this instruction as my diet prescription.   Other diet: regular    Special Instructions: None    · Is patient discharged on Warfarin / Coumadin?   No     Depression / Suicide Risk    As you are discharged from this [facility] facility, it is important to learn how to keep safe from harming yourself.    Recognize the warning signs:  · Abrupt changes in personality, positive or negative- including increase in energy   · Giving away possessions  · Change in eating patterns- significant weight changes-  positive or negative  · Change in sleeping patterns- unable to sleep or sleeping all the time   · Unwillingness or inability to communicate  · Depression  · Unusual sadness, discouragement and loneliness  · Talk of wanting to die  · Neglect of personal appearance   · Rebelliousness- reckless behavior  · Withdrawal from people/activities they love  · Confusion- inability to concentrate     If you or a loved one observes any of these behaviors or has concerns about self-harm, here's what you can do:  · Talk about it- your feelings and reasons for harming yourself  · Remove any means that you might use to hurt yourself (examples: pills, rope, extension cords, firearm)  · Get professional help from the community (Mental Health, Substance  Abuse, psychological counseling)  · Do not be alone:Call your Safe Contact- someone whom you trust who will be there for you.  · Call your local CRISIS HOTLINE 000-3191 or 482-821-3737  · Call your local Children's Mobile Crisis Response Team [place] (921) 668-3776 or www.[place]  · Call the toll free National Suicide Prevention Hotlines   · National Suicide Prevention Lifeline 552-059-TWXN (9878)  · National Hope Line Network 800-SUICIDE (506-9879)

## 2018-09-06 ENCOUNTER — HOSPITAL ENCOUNTER (EMERGENCY)
Dept: HOSPITAL 8 - ED | Age: 63
Discharge: HOME | End: 2018-09-06
Payer: MEDICAID

## 2018-09-06 VITALS — SYSTOLIC BLOOD PRESSURE: 91 MMHG | DIASTOLIC BLOOD PRESSURE: 73 MMHG

## 2018-09-06 VITALS — WEIGHT: 143.3 LBS | BODY MASS INDEX: 20.52 KG/M2 | HEIGHT: 70 IN

## 2018-09-06 DIAGNOSIS — G89.29: Primary | ICD-10-CM

## 2018-09-06 DIAGNOSIS — M79.662: ICD-10-CM

## 2018-09-06 DIAGNOSIS — M79.652: ICD-10-CM

## 2018-09-06 PROCEDURE — 99283 EMERGENCY DEPT VISIT LOW MDM: CPT

## 2018-09-06 PROCEDURE — 99281 EMR DPT VST MAYX REQ PHY/QHP: CPT

## 2018-09-06 PROCEDURE — 99282 EMERGENCY DEPT VISIT SF MDM: CPT

## 2018-09-08 NOTE — PROGRESS NOTES
PROCEDURE NOTE  Patient seen in collaboration with wound care provider,  Mary Mills RN    HPI:  Patient is a 62 y.o.-year-old male with past medical history that includes cognitive impairment, alcoholosm, and smoking,  referred to the wound clinic for evaluation and treatment of a very large, full thickness wound to his left  Medial leg .  He is a very poor historian, disorganized in his thoughts and speech.  Clear history of this wound difficult to obtain.  He states he was hiking in May or June of 2018, when he fell down a hill, hitting bushes and branches along the way.  He was treating the wound himself for a few months until he was hospitalized in August.  He has a flexion contraction of this leg, etiology unclear and patient unable to provide clear explanation.  He is a current everyday smoker, not interested in quitting at this time. There are large, thick crusts along the wound edges, and the wound bed is covered with dry, nonviable tissue.  The RN, Anum, debrided the wound bed as much as pt was able to tolerate today.     He is seen by me for excisional debridement of nonviable tissue from wound edges.  He presents today accompanied by his  from [facility].     DESCRIPTION OF PROCEDURE: excisional debridement of nonviable tissue from left medial leg wound    PMH, medications and recent labs reviewed    ANESTHESIA:  2% viscous lidocaine applied topically to wound bed for approximately 5 minutes prior to debridement    PROCEDURE: .  Scalpel and forceps used to excise crusts from wound edges.  Total area debrided, ~40 cm2.  Debridement carried to skin level..  Wound care completed by Anum.  Patient tolerated the procedure well, without c/o pain or discomfort.                            ASSESSMENT/PLAN:      ICD-10-CM   1. Open wound of left lower leg, initial encounter- Large full thickness wound of unclear etiology, though likely d/t traumatic injury.  Debrided as much as pt could  tolerate today in clinic.  Pt to return to clinic 1-2 times per week for debridement.  Consider VAC once wound bed adequately prepared.  S81.153I   2. Cognitive impairment- Complicating factor, impacting pt's ability for self care.  R41.89   3. Flexion contracture of knee, left- Unclear etiology.  Complicating factor.  M24.562   4. Tobacco use- Complicating factor.  Impaired wound healing.  Pt counseled, not interested in quitting at this time.  Z72.0

## 2018-10-17 ENCOUNTER — HOSPITAL ENCOUNTER (INPATIENT)
Dept: HOSPITAL 8 - ED | Age: 63
LOS: 3 days | Discharge: HOME | DRG: 603 | End: 2018-10-20
Attending: HOSPITALIST | Admitting: HOSPITALIST
Payer: MEDICAID

## 2018-10-17 ENCOUNTER — APPOINTMENT (OUTPATIENT)
Dept: WOUND CARE | Facility: MEDICAL CENTER | Age: 63
End: 2018-10-17
Attending: NURSE PRACTITIONER
Payer: MEDICARE

## 2018-10-17 VITALS — BODY MASS INDEX: 24.13 KG/M2 | HEIGHT: 69 IN | WEIGHT: 162.92 LBS

## 2018-10-17 DIAGNOSIS — E55.9: ICD-10-CM

## 2018-10-17 DIAGNOSIS — E46: ICD-10-CM

## 2018-10-17 DIAGNOSIS — Z59.0: ICD-10-CM

## 2018-10-17 DIAGNOSIS — I10: ICD-10-CM

## 2018-10-17 DIAGNOSIS — Z83.3: ICD-10-CM
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
## 2018-10-17 DIAGNOSIS — Z82.49: ICD-10-CM

## 2018-10-17 DIAGNOSIS — L03.116: Primary | ICD-10-CM

## 2018-10-17 DIAGNOSIS — Z91.19: ICD-10-CM

## 2018-10-17 DIAGNOSIS — D50.9: ICD-10-CM
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
## 2018-10-17 DIAGNOSIS — F17.210: ICD-10-CM
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
## 2018-10-17 DIAGNOSIS — F10.220: ICD-10-CM

## 2018-10-17 DIAGNOSIS — G89.29: ICD-10-CM

## 2018-10-17 LAB
ALBUMIN SERPL-MCNC: 3.1 G/DL (ref 3.4–5)
ALP SERPL-CCNC: 102 U/L (ref 45–117)
ALT SERPL-CCNC: 25 U/L (ref 12–78)
ANION GAP SERPL CALC-SCNC: 13 MMOL/L (ref 5–15)
BASOPHILS # BLD AUTO: 0.03 X10^3/UL (ref 0–0.1)
BASOPHILS NFR BLD AUTO: 0 % (ref 0–1)
BILIRUB SERPL-MCNC: < 0.1 MG/DL (ref 0.2–1)
CALCIUM SERPL-MCNC: 8.7 MG/DL (ref 8.5–10.1)
CHLORIDE SERPL-SCNC: 101 MMOL/L (ref 98–107)
CREAT SERPL-MCNC: 0.9 MG/DL (ref 0.7–1.3)
EOSINOPHIL # BLD AUTO: 0.07 X10^3/UL (ref 0–0.4)
EOSINOPHIL NFR BLD AUTO: 1 % (ref 1–7)
ERYTHROCYTE [DISTWIDTH] IN BLOOD BY AUTOMATED COUNT: 18.7 % (ref 9.4–14.8)
LYMPHOCYTES # BLD AUTO: 1.93 X10^3/UL (ref 1–3.4)
LYMPHOCYTES NFR BLD AUTO: 16 % (ref 22–44)
MCH RBC QN AUTO: 26 PG (ref 27.5–34.5)
MCHC RBC AUTO-ENTMCNC: 32.7 G/DL (ref 33.2–36.2)
MCV RBC AUTO: 79.6 FL (ref 81–97)
MD: NO
MONOCYTES # BLD AUTO: 0.93 X10^3/UL (ref 0.2–0.8)
MONOCYTES NFR BLD AUTO: 8 % (ref 2–9)
NEUTROPHILS # BLD AUTO: 9.4 X10^3/UL (ref 1.8–6.8)
NEUTROPHILS NFR BLD AUTO: 76 % (ref 42–75)
PLATELET # BLD AUTO: 428 X10^3/UL (ref 130–400)
PMV BLD AUTO: 7.4 FL (ref 7.4–10.4)
PROT SERPL-MCNC: 7.5 G/DL (ref 6.4–8.2)
RBC # BLD AUTO: 4.65 X10^6/UL (ref 4.38–5.82)

## 2018-10-17 PROCEDURE — 36415 COLL VENOUS BLD VENIPUNCTURE: CPT

## 2018-10-17 PROCEDURE — 96374 THER/PROPH/DIAG INJ IV PUSH: CPT

## 2018-10-17 PROCEDURE — 87070 CULTURE OTHR SPECIMN AEROBIC: CPT

## 2018-10-17 PROCEDURE — 84439 ASSAY OF FREE THYROXINE: CPT

## 2018-10-17 PROCEDURE — 85025 COMPLETE CBC W/AUTO DIFF WBC: CPT

## 2018-10-17 PROCEDURE — 84466 ASSAY OF TRANSFERRIN: CPT

## 2018-10-17 PROCEDURE — 87205 SMEAR GRAM STAIN: CPT

## 2018-10-17 PROCEDURE — 82306 VITAMIN D 25 HYDROXY: CPT

## 2018-10-17 PROCEDURE — 83550 IRON BINDING TEST: CPT

## 2018-10-17 PROCEDURE — 83540 ASSAY OF IRON: CPT

## 2018-10-17 PROCEDURE — 80061 LIPID PANEL: CPT

## 2018-10-17 PROCEDURE — 82607 VITAMIN B-12: CPT

## 2018-10-17 PROCEDURE — 87040 BLOOD CULTURE FOR BACTERIA: CPT

## 2018-10-17 PROCEDURE — 83036 HEMOGLOBIN GLYCOSYLATED A1C: CPT

## 2018-10-17 PROCEDURE — 99285 EMERGENCY DEPT VISIT HI MDM: CPT

## 2018-10-17 PROCEDURE — 83735 ASSAY OF MAGNESIUM: CPT

## 2018-10-17 PROCEDURE — 87147 CULTURE TYPE IMMUNOLOGIC: CPT

## 2018-10-17 PROCEDURE — 84443 ASSAY THYROID STIM HORMONE: CPT

## 2018-10-17 PROCEDURE — 87077 CULTURE AEROBIC IDENTIFY: CPT

## 2018-10-17 PROCEDURE — 82728 ASSAY OF FERRITIN: CPT

## 2018-10-17 PROCEDURE — 80307 DRUG TEST PRSMV CHEM ANLYZR: CPT

## 2018-10-17 PROCEDURE — 87186 SC STD MICRODIL/AGAR DIL: CPT

## 2018-10-17 PROCEDURE — 80053 COMPREHEN METABOLIC PANEL: CPT

## 2018-10-17 SDOH — ECONOMIC STABILITY - HOUSING INSECURITY: HOMELESSNESS: Z59.0

## 2018-10-18 VITALS — DIASTOLIC BLOOD PRESSURE: 65 MMHG | SYSTOLIC BLOOD PRESSURE: 104 MMHG

## 2018-10-18 VITALS — SYSTOLIC BLOOD PRESSURE: 153 MMHG | DIASTOLIC BLOOD PRESSURE: 90 MMHG

## 2018-10-18 VITALS — DIASTOLIC BLOOD PRESSURE: 62 MMHG | SYSTOLIC BLOOD PRESSURE: 115 MMHG

## 2018-10-18 VITALS — DIASTOLIC BLOOD PRESSURE: 73 MMHG | SYSTOLIC BLOOD PRESSURE: 127 MMHG

## 2018-10-18 VITALS — SYSTOLIC BLOOD PRESSURE: 100 MMHG | DIASTOLIC BLOOD PRESSURE: 63 MMHG

## 2018-10-18 LAB
% IRON SATURATION: 10 % (ref 20–55)
ALBUMIN SERPL-MCNC: 2.6 G/DL (ref 3.4–5)
ALP SERPL-CCNC: 92 U/L (ref 45–117)
ALT SERPL-CCNC: 24 U/L (ref 12–78)
ANION GAP SERPL CALC-SCNC: 6 MMOL/L (ref 5–15)
BASOPHILS # BLD AUTO: 0.04 X10^3/UL (ref 0–0.1)
BASOPHILS NFR BLD AUTO: 1 % (ref 0–1)
BILIRUB SERPL-MCNC: 0.1 MG/DL (ref 0.2–1)
CALCIUM SERPL-MCNC: 7.9 MG/DL (ref 8.5–10.1)
CHLORIDE SERPL-SCNC: 109 MMOL/L (ref 98–107)
CHOL/HDL RATIO: 2.9
CREAT SERPL-MCNC: 0.88 MG/DL (ref 0.7–1.3)
EOSINOPHIL # BLD AUTO: 0.08 X10^3/UL (ref 0–0.4)
EOSINOPHIL NFR BLD AUTO: 1 % (ref 1–7)
ERYTHROCYTE [DISTWIDTH] IN BLOOD BY AUTOMATED COUNT: 18.7 % (ref 9.4–14.8)
EST. AVERAGE GLUCOSE BLD GHB EST-MCNC: 120 MG/DL (ref 0–126)
HBA1C MFR BLD: 5.8 % (ref 4.2–6.3)
HDL CHOL %: 34 % (ref 26–37)
HDL CHOLESTEROL (DIRECT): 41 MG/DL (ref 40–60)
IRON LEVEL: 33 MCG/DL (ref 65–175)
LDL CHOLESTEROL,CALCULATED: 47 MG/DL (ref 54–169)
LDLC/HDLC SERPL: 1.1 {RATIO} (ref 0.5–3)
LYMPHOCYTES # BLD AUTO: 1.2 X10^3/UL (ref 1–3.4)
LYMPHOCYTES NFR BLD AUTO: 17 % (ref 22–44)
MCH RBC QN AUTO: 25.8 PG (ref 27.5–34.5)
MCHC RBC AUTO-ENTMCNC: 32.8 G/DL (ref 33.2–36.2)
MCV RBC AUTO: 78.7 FL (ref 81–97)
MD: NO
MONOCYTES # BLD AUTO: 0.84 X10^3/UL (ref 0.2–0.8)
MONOCYTES NFR BLD AUTO: 12 % (ref 2–9)
NEUTROPHILS # BLD AUTO: 4.81 X10^3/UL (ref 1.8–6.8)
NEUTROPHILS NFR BLD AUTO: 69 % (ref 42–75)
PLATELET # BLD AUTO: 360 X10^3/UL (ref 130–400)
PMV BLD AUTO: 7.7 FL (ref 7.4–10.4)
PROT SERPL-MCNC: 6.1 G/DL (ref 6.4–8.2)
RBC # BLD AUTO: 3.95 X10^6/UL (ref 4.38–5.82)
T4 FREE SERPL-MCNC: 1.02 NG/DL (ref 0.76–1.46)
TIBC SERPL-MCNC: 347 MCG/DL (ref 250–450)
TRANSFERRIN SERPL-MCNC: 279 MG/DL (ref 200–360)
TRIGL SERPL-MCNC: 155 MG/DL (ref 50–200)
TSH SERPL-ACNC: 1.16 MIU/L (ref 0.36–3.74)
VLDLC SERPL CALC-MCNC: 31 MG/DL (ref 0–25)

## 2018-10-18 RX ADMIN — HEPARIN SODIUM SCH UNITS: 5000 INJECTION, SOLUTION INTRAVENOUS; SUBCUTANEOUS at 01:21

## 2018-10-18 RX ADMIN — AMPICILLIN SODIUM AND SULBACTAM SODIUM SCH MLS/HR: 2; 1 INJECTION, POWDER, FOR SOLUTION INTRAMUSCULAR; INTRAVENOUS at 21:31

## 2018-10-18 RX ADMIN — TAZOBACTAM SODIUM AND PIPERACILLIN SODIUM SCH MLS/HR: 375; 3 INJECTION, SOLUTION INTRAVENOUS at 02:03

## 2018-10-18 RX ADMIN — VANCOMYCIN HYDROCHLORIDE SCH MLS/HR: 1 INJECTION, POWDER, LYOPHILIZED, FOR SOLUTION INTRAVENOUS at 12:20

## 2018-10-18 RX ADMIN — AMPICILLIN SODIUM AND SULBACTAM SODIUM SCH MLS/HR: 2; 1 INJECTION, POWDER, FOR SOLUTION INTRAMUSCULAR; INTRAVENOUS at 14:30

## 2018-10-18 RX ADMIN — HEPARIN SODIUM SCH UNITS: 5000 INJECTION, SOLUTION INTRAVENOUS; SUBCUTANEOUS at 16:37

## 2018-10-18 RX ADMIN — FOLIC ACID SCH MG: 1 TABLET ORAL at 08:44

## 2018-10-18 RX ADMIN — TAZOBACTAM SODIUM AND PIPERACILLIN SODIUM SCH MLS/HR: 375; 3 INJECTION, SOLUTION INTRAVENOUS at 08:24

## 2018-10-18 RX ADMIN — HEPARIN SODIUM SCH UNITS: 5000 INJECTION, SOLUTION INTRAVENOUS; SUBCUTANEOUS at 08:45

## 2018-10-18 RX ADMIN — Medication SCH TAB: at 08:44

## 2018-10-18 RX ADMIN — Medication SCH MG: at 08:44

## 2018-10-19 VITALS — SYSTOLIC BLOOD PRESSURE: 133 MMHG | DIASTOLIC BLOOD PRESSURE: 89 MMHG

## 2018-10-19 VITALS — DIASTOLIC BLOOD PRESSURE: 74 MMHG | SYSTOLIC BLOOD PRESSURE: 136 MMHG

## 2018-10-19 VITALS — SYSTOLIC BLOOD PRESSURE: 123 MMHG | DIASTOLIC BLOOD PRESSURE: 79 MMHG

## 2018-10-19 VITALS — DIASTOLIC BLOOD PRESSURE: 96 MMHG | SYSTOLIC BLOOD PRESSURE: 147 MMHG

## 2018-10-19 LAB
BASOPHILS # BLD AUTO: 0.08 X10^3/UL (ref 0–0.1)
BASOPHILS NFR BLD AUTO: 1 % (ref 0–1)
EOSINOPHIL # BLD AUTO: 0.08 X10^3/UL (ref 0–0.4)
EOSINOPHIL NFR BLD AUTO: 1 % (ref 1–7)
ERYTHROCYTE [DISTWIDTH] IN BLOOD BY AUTOMATED COUNT: 18.7 % (ref 9.4–14.8)
LYMPHOCYTES # BLD AUTO: 1.69 X10^3/UL (ref 1–3.4)
LYMPHOCYTES NFR BLD AUTO: 21 % (ref 22–44)
MCH RBC QN AUTO: 26.2 PG (ref 27.5–34.5)
MCHC RBC AUTO-ENTMCNC: 32.9 G/DL (ref 33.2–36.2)
MCV RBC AUTO: 79.7 FL (ref 81–97)
MD: NO
MONOCYTES # BLD AUTO: 1.24 X10^3/UL (ref 0.2–0.8)
MONOCYTES NFR BLD AUTO: 16 % (ref 2–9)
NEUTROPHILS # BLD AUTO: 4.79 X10^3/UL (ref 1.8–6.8)
NEUTROPHILS NFR BLD AUTO: 61 % (ref 42–75)
PLATELET # BLD AUTO: 327 X10^3/UL (ref 130–400)
PMV BLD AUTO: 7.6 FL (ref 7.4–10.4)
RBC # BLD AUTO: 3.85 X10^6/UL (ref 4.38–5.82)

## 2018-10-19 RX ADMIN — AMPICILLIN SODIUM AND SULBACTAM SODIUM SCH MLS/HR: 2; 1 INJECTION, POWDER, FOR SOLUTION INTRAMUSCULAR; INTRAVENOUS at 13:51

## 2018-10-19 RX ADMIN — HEPARIN SODIUM SCH UNITS: 5000 INJECTION, SOLUTION INTRAVENOUS; SUBCUTANEOUS at 00:16

## 2018-10-19 RX ADMIN — SODIUM CHLORIDE SCH MLS/HR: 0.9 INJECTION, SOLUTION INTRAVENOUS at 00:14

## 2018-10-19 RX ADMIN — AMPICILLIN SODIUM AND SULBACTAM SODIUM SCH MLS/HR: 2; 1 INJECTION, POWDER, FOR SOLUTION INTRAMUSCULAR; INTRAVENOUS at 05:28

## 2018-10-19 RX ADMIN — SODIUM CHLORIDE SCH MLS/HR: 0.9 INJECTION, SOLUTION INTRAVENOUS at 23:00

## 2018-10-19 RX ADMIN — Medication SCH TAB: at 08:28

## 2018-10-19 RX ADMIN — Medication SCH MG: at 08:28

## 2018-10-19 RX ADMIN — HEPARIN SODIUM SCH UNITS: 5000 INJECTION, SOLUTION INTRAVENOUS; SUBCUTANEOUS at 08:28

## 2018-10-19 RX ADMIN — HEPARIN SODIUM SCH UNITS: 5000 INJECTION, SOLUTION INTRAVENOUS; SUBCUTANEOUS at 16:59

## 2018-10-19 RX ADMIN — SODIUM CHLORIDE SCH MLS/HR: 0.9 INJECTION, SOLUTION INTRAVENOUS at 10:14

## 2018-10-19 RX ADMIN — VANCOMYCIN HYDROCHLORIDE SCH MLS/HR: 1 INJECTION, POWDER, LYOPHILIZED, FOR SOLUTION INTRAVENOUS at 06:05

## 2018-10-19 RX ADMIN — FOLIC ACID SCH MG: 1 TABLET ORAL at 08:28

## 2018-10-20 VITALS — SYSTOLIC BLOOD PRESSURE: 140 MMHG | DIASTOLIC BLOOD PRESSURE: 98 MMHG

## 2018-10-20 VITALS — DIASTOLIC BLOOD PRESSURE: 67 MMHG | SYSTOLIC BLOOD PRESSURE: 102 MMHG

## 2018-10-20 VITALS — SYSTOLIC BLOOD PRESSURE: 132 MMHG | DIASTOLIC BLOOD PRESSURE: 87 MMHG

## 2018-10-20 LAB
BASOPHILS # BLD AUTO: 0.08 X10^3/UL (ref 0–0.1)
BASOPHILS NFR BLD AUTO: 1 % (ref 0–1)
EOSINOPHIL # BLD AUTO: 0.17 X10^3/UL (ref 0–0.4)
EOSINOPHIL NFR BLD AUTO: 2 % (ref 1–7)
ERYTHROCYTE [DISTWIDTH] IN BLOOD BY AUTOMATED COUNT: 18.6 % (ref 9.4–14.8)
LYMPHOCYTES # BLD AUTO: 1.99 X10^3/UL (ref 1–3.4)
LYMPHOCYTES NFR BLD AUTO: 23 % (ref 22–44)
MCH RBC QN AUTO: 26 PG (ref 27.5–34.5)
MCHC RBC AUTO-ENTMCNC: 32.7 G/DL (ref 33.2–36.2)
MCV RBC AUTO: 79.5 FL (ref 81–97)
MD: NO
MONOCYTES # BLD AUTO: 1.12 X10^3/UL (ref 0.2–0.8)
MONOCYTES NFR BLD AUTO: 13 % (ref 2–9)
NEUTROPHILS # BLD AUTO: 5.32 X10^3/UL (ref 1.8–6.8)
NEUTROPHILS NFR BLD AUTO: 61 % (ref 42–75)
PLATELET # BLD AUTO: 339 X10^3/UL (ref 130–400)
PMV BLD AUTO: 7.8 FL (ref 7.4–10.4)
RBC # BLD AUTO: 3.63 X10^6/UL (ref 4.38–5.82)

## 2018-10-20 RX ADMIN — HEPARIN SODIUM SCH UNITS: 5000 INJECTION, SOLUTION INTRAVENOUS; SUBCUTANEOUS at 00:34

## 2018-10-20 RX ADMIN — SODIUM CHLORIDE SCH MLS/HR: 0.9 INJECTION, SOLUTION INTRAVENOUS at 12:00

## 2018-10-20 RX ADMIN — Medication SCH MG: at 07:56

## 2018-10-20 RX ADMIN — FOLIC ACID SCH MG: 1 TABLET ORAL at 07:56

## 2018-10-20 RX ADMIN — AMPICILLIN SODIUM AND SULBACTAM SODIUM SCH MLS/HR: 2; 1 INJECTION, POWDER, FOR SOLUTION INTRAMUSCULAR; INTRAVENOUS at 07:56

## 2018-10-20 RX ADMIN — Medication SCH TAB: at 07:56

## 2018-10-20 RX ADMIN — HEPARIN SODIUM SCH UNITS: 5000 INJECTION, SOLUTION INTRAVENOUS; SUBCUTANEOUS at 07:58

## 2018-10-20 RX ADMIN — AMPICILLIN SODIUM AND SULBACTAM SODIUM SCH MLS/HR: 2; 1 INJECTION, POWDER, FOR SOLUTION INTRAMUSCULAR; INTRAVENOUS at 00:03

## 2018-10-20 RX ADMIN — VANCOMYCIN HYDROCHLORIDE SCH MLS/HR: 1 INJECTION, POWDER, LYOPHILIZED, FOR SOLUTION INTRAVENOUS at 01:05

## 2018-10-22 ENCOUNTER — HOSPITAL ENCOUNTER (EMERGENCY)
Dept: HOSPITAL 8 - WOUND | Age: 63
Discharge: HOME | End: 2018-10-22
Payer: MEDICAID

## 2018-10-22 VITALS — DIASTOLIC BLOOD PRESSURE: 83 MMHG | SYSTOLIC BLOOD PRESSURE: 125 MMHG

## 2018-10-22 VITALS — HEIGHT: 70 IN | BODY MASS INDEX: 21.15 KG/M2 | WEIGHT: 147.71 LBS

## 2018-10-22 DIAGNOSIS — Z72.9: ICD-10-CM

## 2018-10-22 DIAGNOSIS — G89.29: ICD-10-CM

## 2018-10-22 DIAGNOSIS — L03.116: Primary | ICD-10-CM

## 2018-10-22 DIAGNOSIS — Z59.0: ICD-10-CM

## 2018-10-22 DIAGNOSIS — I10: ICD-10-CM

## 2018-10-22 LAB
BASOPHILS # BLD AUTO: 0.06 X10^3/UL (ref 0–0.1)
BASOPHILS NFR BLD AUTO: 1 % (ref 0–1)
EOSINOPHIL # BLD AUTO: 0.12 X10^3/UL (ref 0–0.4)
EOSINOPHIL NFR BLD AUTO: 1 % (ref 1–7)
ERYTHROCYTE [DISTWIDTH] IN BLOOD BY AUTOMATED COUNT: 18.9 % (ref 9.4–14.8)
LYMPHOCYTES # BLD AUTO: 1.85 X10^3/UL (ref 1–3.4)
LYMPHOCYTES NFR BLD AUTO: 20 % (ref 22–44)
MCH RBC QN AUTO: 25.7 PG (ref 27.5–34.5)
MCHC RBC AUTO-ENTMCNC: 32.3 G/DL (ref 33.2–36.2)
MCV RBC AUTO: 79.5 FL (ref 81–97)
MD: NO
MONOCYTES # BLD AUTO: 0.66 X10^3/UL (ref 0.2–0.8)
MONOCYTES NFR BLD AUTO: 7 % (ref 2–9)
NEUTROPHILS # BLD AUTO: 6.39 X10^3/UL (ref 1.8–6.8)
NEUTROPHILS NFR BLD AUTO: 70 % (ref 42–75)
PLATELET # BLD AUTO: 488 X10^3/UL (ref 130–400)
PMV BLD AUTO: 6.8 FL (ref 7.4–10.4)
RBC # BLD AUTO: 4.11 X10^6/UL (ref 4.38–5.82)

## 2018-10-22 PROCEDURE — 85025 COMPLETE CBC W/AUTO DIFF WBC: CPT

## 2018-10-22 PROCEDURE — 99285 EMERGENCY DEPT VISIT HI MDM: CPT

## 2018-10-22 PROCEDURE — 36415 COLL VENOUS BLD VENIPUNCTURE: CPT

## 2018-10-22 SDOH — ECONOMIC STABILITY - HOUSING INSECURITY: HOMELESSNESS: Z59.0

## 2018-10-23 ENCOUNTER — HOSPITAL ENCOUNTER (EMERGENCY)
Dept: HOSPITAL 8 - ED | Age: 63
LOS: 1 days | Discharge: HOME | End: 2018-10-24
Payer: MEDICAID

## 2018-10-23 VITALS — HEIGHT: 70 IN | WEIGHT: 150.47 LBS | BODY MASS INDEX: 21.54 KG/M2

## 2018-10-23 DIAGNOSIS — S01.01XA: Primary | ICD-10-CM

## 2018-10-23 DIAGNOSIS — F10.220: ICD-10-CM

## 2018-10-23 DIAGNOSIS — I10: ICD-10-CM
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
## 2018-10-23 PROCEDURE — 72125 CT NECK SPINE W/O DYE: CPT

## 2018-10-23 PROCEDURE — 99284 EMERGENCY DEPT VISIT MOD MDM: CPT

## 2018-10-23 PROCEDURE — 90471 IMMUNIZATION ADMIN: CPT

## 2018-10-23 PROCEDURE — 70450 CT HEAD/BRAIN W/O DYE: CPT

## 2018-10-23 PROCEDURE — 12031 INTMD RPR S/A/T/EXT 2.5 CM/<: CPT

## 2018-10-23 PROCEDURE — 90715 TDAP VACCINE 7 YRS/> IM: CPT

## 2018-10-24 ENCOUNTER — APPOINTMENT (OUTPATIENT)
Dept: RADIOLOGY | Facility: MEDICAL CENTER | Age: 63
DRG: 897 | End: 2018-10-24
Attending: EMERGENCY MEDICINE
Payer: MEDICAID

## 2018-10-24 ENCOUNTER — APPOINTMENT (OUTPATIENT)
Dept: RADIOLOGY | Facility: MEDICAL CENTER | Age: 63
DRG: 897 | End: 2018-10-24
Attending: SPECIALIST
Payer: MEDICAID

## 2018-10-24 ENCOUNTER — HOSPITAL ENCOUNTER (INPATIENT)
Facility: MEDICAL CENTER | Age: 63
LOS: 2 days | DRG: 897 | End: 2018-10-26
Attending: EMERGENCY MEDICINE | Admitting: HOSPITALIST
Payer: MEDICAID

## 2018-10-24 VITALS — SYSTOLIC BLOOD PRESSURE: 122 MMHG | DIASTOLIC BLOOD PRESSURE: 78 MMHG

## 2018-10-24 DIAGNOSIS — L03.116 LEFT LEG CELLULITIS: ICD-10-CM

## 2018-10-24 DIAGNOSIS — S81.802A WOUND OF LEFT LEG, INITIAL ENCOUNTER: ICD-10-CM

## 2018-10-24 DIAGNOSIS — R79.89 ELEVATED LACTIC ACID LEVEL: ICD-10-CM

## 2018-10-24 DIAGNOSIS — F10.920 ALCOHOLIC INTOXICATION WITHOUT COMPLICATION (HCC): ICD-10-CM

## 2018-10-24 DIAGNOSIS — E86.0 DEHYDRATION: ICD-10-CM

## 2018-10-24 DIAGNOSIS — S81.802A OPEN WOUND OF LEFT LOWER LEG, INITIAL ENCOUNTER: ICD-10-CM

## 2018-10-24 PROBLEM — F10.921 ALCOHOL INTOXICATION WITH DELIRIUM (HCC): Status: ACTIVE | Noted: 2018-10-24

## 2018-10-24 LAB
ALBUMIN SERPL BCP-MCNC: 3.5 G/DL (ref 3.2–4.9)
ALBUMIN/GLOB SERPL: 1 G/DL
ALP SERPL-CCNC: 100 U/L (ref 30–99)
ALT SERPL-CCNC: 30 U/L (ref 2–50)
AMPHET UR QL SCN: NEGATIVE
ANION GAP SERPL CALC-SCNC: 13 MMOL/L (ref 0–11.9)
APPEARANCE UR: CLEAR
AST SERPL-CCNC: 46 U/L (ref 12–45)
BARBITURATES UR QL SCN: NEGATIVE
BASOPHILS # BLD AUTO: 0.9 % (ref 0–1.8)
BASOPHILS # BLD: 0.08 K/UL (ref 0–0.12)
BENZODIAZ UR QL SCN: NEGATIVE
BILIRUB SERPL-MCNC: 0.2 MG/DL (ref 0.1–1.5)
BILIRUB UR QL STRIP.AUTO: NEGATIVE
BUN SERPL-MCNC: 15 MG/DL (ref 8–22)
BZE UR QL SCN: NEGATIVE
CALCIUM SERPL-MCNC: 8.3 MG/DL (ref 8.5–10.5)
CANNABINOIDS UR QL SCN: NEGATIVE
CHLORIDE SERPL-SCNC: 102 MMOL/L (ref 96–112)
CO2 SERPL-SCNC: 20 MMOL/L (ref 20–33)
COLOR UR: YELLOW
CREAT SERPL-MCNC: 0.66 MG/DL (ref 0.5–1.4)
EOSINOPHIL # BLD AUTO: 0.03 K/UL (ref 0–0.51)
EOSINOPHIL NFR BLD: 0.3 % (ref 0–6.9)
EPI CELLS #/AREA URNS HPF: ABNORMAL /HPF
ERYTHROCYTE [DISTWIDTH] IN BLOOD BY AUTOMATED COUNT: 52.4 FL (ref 35.9–50)
ETHANOL BLD-MCNC: 0.39 G/DL
GLOBULIN SER CALC-MCNC: 3.4 G/DL (ref 1.9–3.5)
GLUCOSE SERPL-MCNC: 98 MG/DL (ref 65–99)
GLUCOSE UR STRIP.AUTO-MCNC: NEGATIVE MG/DL
GRAM STN SPEC: NORMAL
HCT VFR BLD AUTO: 32.6 % (ref 42–52)
HGB BLD-MCNC: 10.1 G/DL (ref 14–18)
IMM GRANULOCYTES # BLD AUTO: 0.05 K/UL (ref 0–0.11)
IMM GRANULOCYTES NFR BLD AUTO: 0.5 % (ref 0–0.9)
KETONES UR STRIP.AUTO-MCNC: NEGATIVE MG/DL
LACTATE BLD-SCNC: 3.1 MMOL/L (ref 0.5–2)
LEUKOCYTE ESTERASE UR QL STRIP.AUTO: ABNORMAL
LYMPHOCYTES # BLD AUTO: 1.88 K/UL (ref 1–4.8)
LYMPHOCYTES NFR BLD: 20.3 % (ref 22–41)
MCH RBC QN AUTO: 25.4 PG (ref 27–33)
MCHC RBC AUTO-ENTMCNC: 31 G/DL (ref 33.7–35.3)
MCV RBC AUTO: 82.1 FL (ref 81.4–97.8)
METHADONE UR QL SCN: NEGATIVE
MICRO URNS: ABNORMAL
MONOCYTES # BLD AUTO: 0.83 K/UL (ref 0–0.85)
MONOCYTES NFR BLD AUTO: 9 % (ref 0–13.4)
NEUTROPHILS # BLD AUTO: 6.38 K/UL (ref 1.82–7.42)
NEUTROPHILS NFR BLD: 69 % (ref 44–72)
NITRITE UR QL STRIP.AUTO: NEGATIVE
NRBC # BLD AUTO: 0 K/UL
NRBC BLD-RTO: 0 /100 WBC
OPIATES UR QL SCN: NEGATIVE
OXYCODONE UR QL SCN: NEGATIVE
PCP UR QL SCN: NEGATIVE
PH UR STRIP.AUTO: 5.5 [PH]
PLATELET # BLD AUTO: 439 K/UL (ref 164–446)
PMV BLD AUTO: 8.7 FL (ref 9–12.9)
POTASSIUM SERPL-SCNC: 4.2 MMOL/L (ref 3.6–5.5)
PROPOXYPH UR QL SCN: NEGATIVE
PROT SERPL-MCNC: 6.9 G/DL (ref 6–8.2)
PROT UR QL STRIP: NEGATIVE MG/DL
RBC # BLD AUTO: 3.97 M/UL (ref 4.7–6.1)
RBC UR QL AUTO: NEGATIVE
SIGNIFICANT IND 70042: NORMAL
SITE SITE: NORMAL
SODIUM SERPL-SCNC: 135 MMOL/L (ref 135–145)
SOURCE SOURCE: NORMAL
SP GR UR STRIP.AUTO: 1.01
UROBILINOGEN UR STRIP.AUTO-MCNC: 0.2 MG/DL
WBC # BLD AUTO: 9.3 K/UL (ref 4.8–10.8)
WBC #/AREA URNS HPF: ABNORMAL /HPF

## 2018-10-24 PROCEDURE — 87186 SC STD MICRODIL/AGAR DIL: CPT

## 2018-10-24 PROCEDURE — 700111 HCHG RX REV CODE 636 W/ 250 OVERRIDE (IP): Performed by: EMERGENCY MEDICINE

## 2018-10-24 PROCEDURE — A9270 NON-COVERED ITEM OR SERVICE: HCPCS | Performed by: HOSPITALIST

## 2018-10-24 PROCEDURE — 80053 COMPREHEN METABOLIC PANEL: CPT

## 2018-10-24 PROCEDURE — 96365 THER/PROPH/DIAG IV INF INIT: CPT

## 2018-10-24 PROCEDURE — 700111 HCHG RX REV CODE 636 W/ 250 OVERRIDE (IP): Performed by: SPECIALIST

## 2018-10-24 PROCEDURE — 99223 1ST HOSP IP/OBS HIGH 75: CPT | Performed by: HOSPITALIST

## 2018-10-24 PROCEDURE — 83605 ASSAY OF LACTIC ACID: CPT

## 2018-10-24 PROCEDURE — 93005 ELECTROCARDIOGRAM TRACING: CPT | Performed by: HOSPITALIST

## 2018-10-24 PROCEDURE — 87077 CULTURE AEROBIC IDENTIFY: CPT | Mod: 91

## 2018-10-24 PROCEDURE — 87040 BLOOD CULTURE FOR BACTERIA: CPT | Mod: 91

## 2018-10-24 PROCEDURE — 73552 X-RAY EXAM OF FEMUR 2/>: CPT | Mod: LT

## 2018-10-24 PROCEDURE — 87205 SMEAR GRAM STAIN: CPT

## 2018-10-24 PROCEDURE — 700105 HCHG RX REV CODE 258: Performed by: EMERGENCY MEDICINE

## 2018-10-24 PROCEDURE — 80307 DRUG TEST PRSMV CHEM ANLYZR: CPT

## 2018-10-24 PROCEDURE — 73590 X-RAY EXAM OF LOWER LEG: CPT | Mod: LT

## 2018-10-24 PROCEDURE — 770020 HCHG ROOM/CARE - TELE (206)

## 2018-10-24 PROCEDURE — 96366 THER/PROPH/DIAG IV INF ADDON: CPT

## 2018-10-24 PROCEDURE — 96368 THER/DIAG CONCURRENT INF: CPT

## 2018-10-24 PROCEDURE — 700105 HCHG RX REV CODE 258: Performed by: HOSPITALIST

## 2018-10-24 PROCEDURE — 81001 URINALYSIS AUTO W/SCOPE: CPT

## 2018-10-24 PROCEDURE — 71100 X-RAY EXAM RIBS UNI 2 VIEWS: CPT

## 2018-10-24 PROCEDURE — 85025 COMPLETE CBC W/AUTO DIFF WBC: CPT

## 2018-10-24 PROCEDURE — 99285 EMERGENCY DEPT VISIT HI MDM: CPT

## 2018-10-24 PROCEDURE — 700111 HCHG RX REV CODE 636 W/ 250 OVERRIDE (IP): Performed by: HOSPITALIST

## 2018-10-24 PROCEDURE — 700102 HCHG RX REV CODE 250 W/ 637 OVERRIDE(OP): Performed by: HOSPITALIST

## 2018-10-24 PROCEDURE — 87070 CULTURE OTHR SPECIMN AEROBIC: CPT

## 2018-10-24 PROCEDURE — HZ2ZZZZ DETOXIFICATION SERVICES FOR SUBSTANCE ABUSE TREATMENT: ICD-10-PCS | Performed by: HOSPITALIST

## 2018-10-24 PROCEDURE — 36415 COLL VENOUS BLD VENIPUNCTURE: CPT

## 2018-10-24 RX ORDER — FOLIC ACID 1 MG/1
1 TABLET ORAL DAILY
Status: DISCONTINUED | OUTPATIENT
Start: 2018-10-24 | End: 2018-10-26 | Stop reason: HOSPADM

## 2018-10-24 RX ORDER — AMOXICILLIN 250 MG
2 CAPSULE ORAL 2 TIMES DAILY
Status: DISCONTINUED | OUTPATIENT
Start: 2018-10-24 | End: 2018-10-26 | Stop reason: HOSPADM

## 2018-10-24 RX ORDER — SODIUM CHLORIDE 9 MG/ML
30 INJECTION, SOLUTION INTRAVENOUS ONCE
Status: COMPLETED | OUTPATIENT
Start: 2018-10-24 | End: 2018-10-24

## 2018-10-24 RX ORDER — LORAZEPAM 1 MG/1
2 TABLET ORAL
Status: DISCONTINUED | OUTPATIENT
Start: 2018-10-24 | End: 2018-10-26 | Stop reason: HOSPADM

## 2018-10-24 RX ORDER — POLYETHYLENE GLYCOL 3350 17 G/17G
1 POWDER, FOR SOLUTION ORAL
Status: DISCONTINUED | OUTPATIENT
Start: 2018-10-24 | End: 2018-10-26 | Stop reason: HOSPADM

## 2018-10-24 RX ORDER — NICOTINE 21 MG/24HR
21 PATCH, TRANSDERMAL 24 HOURS TRANSDERMAL
Status: DISCONTINUED | OUTPATIENT
Start: 2018-10-24 | End: 2018-10-26 | Stop reason: HOSPADM

## 2018-10-24 RX ORDER — LORAZEPAM 1 MG/1
3 TABLET ORAL
Status: DISCONTINUED | OUTPATIENT
Start: 2018-10-24 | End: 2018-10-26 | Stop reason: HOSPADM

## 2018-10-24 RX ORDER — KETOROLAC TROMETHAMINE 30 MG/ML
30 INJECTION, SOLUTION INTRAMUSCULAR; INTRAVENOUS ONCE
Status: COMPLETED | OUTPATIENT
Start: 2018-10-24 | End: 2018-10-24

## 2018-10-24 RX ORDER — SODIUM CHLORIDE 9 MG/ML
INJECTION, SOLUTION INTRAVENOUS CONTINUOUS
Status: DISCONTINUED | OUTPATIENT
Start: 2018-10-24 | End: 2018-10-26 | Stop reason: HOSPADM

## 2018-10-24 RX ORDER — LORAZEPAM 2 MG/ML
1 INJECTION INTRAMUSCULAR
Status: DISCONTINUED | OUTPATIENT
Start: 2018-10-24 | End: 2018-10-26 | Stop reason: HOSPADM

## 2018-10-24 RX ORDER — THIAMINE MONONITRATE (VIT B1) 100 MG
100 TABLET ORAL DAILY
Status: DISCONTINUED | OUTPATIENT
Start: 2018-10-24 | End: 2018-10-26 | Stop reason: HOSPADM

## 2018-10-24 RX ORDER — LORAZEPAM 1 MG/1
4 TABLET ORAL
Status: DISCONTINUED | OUTPATIENT
Start: 2018-10-24 | End: 2018-10-26 | Stop reason: HOSPADM

## 2018-10-24 RX ORDER — LORAZEPAM 1 MG/1
0.5 TABLET ORAL EVERY 4 HOURS PRN
Status: DISCONTINUED | OUTPATIENT
Start: 2018-10-24 | End: 2018-10-26 | Stop reason: HOSPADM

## 2018-10-24 RX ORDER — LORAZEPAM 1 MG/1
1 TABLET ORAL EVERY 4 HOURS PRN
Status: DISCONTINUED | OUTPATIENT
Start: 2018-10-24 | End: 2018-10-26 | Stop reason: HOSPADM

## 2018-10-24 RX ORDER — LORAZEPAM 2 MG/ML
2 INJECTION INTRAMUSCULAR
Status: DISCONTINUED | OUTPATIENT
Start: 2018-10-24 | End: 2018-10-26 | Stop reason: HOSPADM

## 2018-10-24 RX ORDER — BISACODYL 10 MG
10 SUPPOSITORY, RECTAL RECTAL
Status: DISCONTINUED | OUTPATIENT
Start: 2018-10-24 | End: 2018-10-26 | Stop reason: HOSPADM

## 2018-10-24 RX ORDER — LORAZEPAM 2 MG/ML
0.5 INJECTION INTRAMUSCULAR EVERY 4 HOURS PRN
Status: DISCONTINUED | OUTPATIENT
Start: 2018-10-24 | End: 2018-10-26 | Stop reason: HOSPADM

## 2018-10-24 RX ORDER — LORAZEPAM 2 MG/ML
1.5 INJECTION INTRAMUSCULAR
Status: DISCONTINUED | OUTPATIENT
Start: 2018-10-24 | End: 2018-10-26 | Stop reason: HOSPADM

## 2018-10-24 RX ADMIN — THIAMINE HCL TAB 100 MG 100 MG: 100 TAB at 18:52

## 2018-10-24 RX ADMIN — KETOROLAC TROMETHAMINE 30 MG: 30 INJECTION, SOLUTION INTRAMUSCULAR at 22:45

## 2018-10-24 RX ADMIN — CEFTRIAXONE SODIUM 1 G: 1 INJECTION, POWDER, FOR SOLUTION INTRAMUSCULAR; INTRAVENOUS at 13:04

## 2018-10-24 RX ADMIN — THERA TABS 1 TABLET: TAB at 18:52

## 2018-10-24 RX ADMIN — SODIUM CHLORIDE 1920 ML: 9 INJECTION, SOLUTION INTRAVENOUS at 14:15

## 2018-10-24 RX ADMIN — VANCOMYCIN HYDROCHLORIDE 1600 MG: 100 INJECTION, POWDER, LYOPHILIZED, FOR SOLUTION INTRAVENOUS at 13:04

## 2018-10-24 RX ADMIN — SODIUM CHLORIDE: 9 INJECTION, SOLUTION INTRAVENOUS at 18:52

## 2018-10-24 RX ADMIN — LORAZEPAM 0.5 MG: 2 INJECTION INTRAMUSCULAR; INTRAVENOUS at 22:52

## 2018-10-24 RX ADMIN — Medication 1 MG: at 18:53

## 2018-10-24 ASSESSMENT — COGNITIVE AND FUNCTIONAL STATUS - GENERAL
CLIMB 3 TO 5 STEPS WITH RAILING: A LITTLE
TOILETING: A LITTLE
MOVING FROM LYING ON BACK TO SITTING ON SIDE OF FLAT BED: A LITTLE
MOBILITY SCORE: 18
WALKING IN HOSPITAL ROOM: A LITTLE
DAILY ACTIVITIY SCORE: 18
STANDING UP FROM CHAIR USING ARMS: A LITTLE
PERSONAL GROOMING: A LITTLE
TURNING FROM BACK TO SIDE WHILE IN FLAT BAD: A LITTLE
DRESSING REGULAR UPPER BODY CLOTHING: A LITTLE
SUGGESTED CMS G CODE MODIFIER MOBILITY: CK
EATING MEALS: A LITTLE
MOVING TO AND FROM BED TO CHAIR: A LITTLE
HELP NEEDED FOR BATHING: A LITTLE
DRESSING REGULAR LOWER BODY CLOTHING: A LITTLE
SUGGESTED CMS G CODE MODIFIER DAILY ACTIVITY: CK

## 2018-10-24 ASSESSMENT — LIFESTYLE VARIABLES
HAVE PEOPLE ANNOYED YOU BY CRITICIZING YOUR DRINKING: NO
AUDITORY DISTURBANCES: NOT PRESENT
EVER_SMOKED: YES
TREMOR: *
HOW MANY TIMES IN THE PAST YEAR HAVE YOU HAD 5 OR MORE DRINKS IN A DAY: 25
AGITATION: *
EVER HAD A DRINK FIRST THING IN THE MORNING TO STEADY YOUR NERVES TO GET RID OF A HANGOVER: YES
TOTAL SCORE: 1
ALCOHOL_USE: YES
ORIENTATION AND CLOUDING OF SENSORIUM: ORIENTED AND CAN DO SERIAL ADDITIONS
NAUSEA AND VOMITING: NO NAUSEA AND NO VOMITING
PAROXYSMAL SWEATS: BARELY PERCEPTIBLE SWEATING. PALMS MOIST
ON A TYPICAL DAY WHEN YOU DRINK ALCOHOL HOW MANY DRINKS DO YOU HAVE: 3
VISUAL DISTURBANCES: NOT PRESENT
HAVE YOU EVER FELT YOU SHOULD CUT DOWN ON YOUR DRINKING: NO
TOTAL SCORE: 1
EVER FELT BAD OR GUILTY ABOUT YOUR DRINKING: NO
TOTAL SCORE: 1
TOTAL SCORE: 10
ANXIETY: *
HEADACHE, FULLNESS IN HEAD: NOT PRESENT
AVERAGE NUMBER OF DAYS PER WEEK YOU HAVE A DRINK CONTAINING ALCOHOL: 4
CONSUMPTION TOTAL: POSITIVE

## 2018-10-24 ASSESSMENT — COPD QUESTIONNAIRES
IN THE PAST 12 MONTHS DO YOU DO LESS THAN YOU USED TO BECAUSE OF YOUR BREATHING PROBLEMS: DISAGREE/UNSURE
HAVE YOU SMOKED AT LEAST 100 CIGARETTES IN YOUR ENTIRE LIFE: NO/DON'T KNOW
DURING THE PAST 4 WEEKS HOW MUCH DID YOU FEEL SHORT OF BREATH: NONE/LITTLE OF THE TIME
DO YOU EVER COUGH UP ANY MUCUS OR PHLEGM?: NO/ONLY WITH OCCASIONAL COLDS OR INFECTIONS
COPD SCREENING SCORE: 2

## 2018-10-24 ASSESSMENT — PATIENT HEALTH QUESTIONNAIRE - PHQ9
7. TROUBLE CONCENTRATING ON THINGS, SUCH AS READING THE NEWSPAPER OR WATCHING TELEVISION: NOT AT ALL
8. MOVING OR SPEAKING SO SLOWLY THAT OTHER PEOPLE COULD HAVE NOTICED. OR THE OPPOSITE, BEING SO FIGETY OR RESTLESS THAT YOU HAVE BEEN MOVING AROUND A LOT MORE THAN USUAL: NOT AT ALL
6. FEELING BAD ABOUT YOURSELF - OR THAT YOU ARE A FAILURE OR HAVE LET YOURSELF OR YOUR FAMILY DOWN: NOT AL ALL
2. FEELING DOWN, DEPRESSED, IRRITABLE, OR HOPELESS: MORE THAN HALF THE DAYS
1. LITTLE INTEREST OR PLEASURE IN DOING THINGS: MORE THAN HALF THE DAYS
9. THOUGHTS THAT YOU WOULD BE BETTER OFF DEAD, OR OF HURTING YOURSELF: NOT AT ALL
SUM OF ALL RESPONSES TO PHQ QUESTIONS 1-9: 4
3. TROUBLE FALLING OR STAYING ASLEEP OR SLEEPING TOO MUCH: NOT AT ALL
5. POOR APPETITE OR OVEREATING: NOT AT ALL
SUM OF ALL RESPONSES TO PHQ9 QUESTIONS 1 AND 2: 4
4. FEELING TIRED OR HAVING LITTLE ENERGY: NOT AT ALL

## 2018-10-24 ASSESSMENT — PAIN SCALES - GENERAL
PAINLEVEL_OUTOF10: 8
PAINLEVEL_OUTOF10: 4

## 2018-10-24 NOTE — ED NOTES
Med rec complete per pt at bedside  Allergies reviewed   No ABX within the last 30 days    Pt denies taking any Rx's or OTC medications

## 2018-10-24 NOTE — ED PROVIDER NOTES
"ED Provider Note    CHIEF COMPLAINT  Chief Complaint   Patient presents with   • ETOH Withdrawal   • Wound Check     History is limited by patient's intoxication    HPI  Bola Varela is a 63 y.o. male who presents for evaluation of alcohol intoxication and difficulty ambulating on his left leg secondary to a wound.    EMS was called given that the patient was noted to be having difficulty ambulating.  Patient is not sure what happened to his leg but complains of pain that he is unable to describe further than it being present.    Patient denies being diabetic.  He admits to alcohol use.  Patient denies drug use.    ALLERGIES  No Known Allergies    CURRENT MEDICATIONS  Home Medications     Reviewed by Sarah Ferraro (Pharmacy Tech) on 10/24/18 at 1429  Med List Status: Complete   Medication Last Dose Status        Patient Berhane Taking any Medications                       PAST MEDICAL HISTORY   has a past medical history of Psychiatric problem; Restless leg; and Tobacco use.    SURGICAL HISTORY   has a past surgical history that includes ankle orif (Right).    SOCIAL HISTORY  Social History     Social History Main Topics   • Smoking status: Current Every Day Smoker     Packs/day: 0.30     Years: 40.00     Types: Cigarettes   • Smokeless tobacco: Never Used      Comment: 1 ppd until few years ago   • Alcohol use No      Comment: previous heavy alcohol use, none in 6 weeks   • Drug use: Yes     Types: Marijuana, Inhaled      Comment: marijuana daily    • Sexual activity: Not Currently     Partners: Female       Family Hx:  Unobtainable given patient's intoxication      REVIEW OF SYSTEMS  Patient is a poor historian and unable to give further review of systems beyond what is in the HPI    PHYSICAL EXAM  VITAL SIGNS: /59   Pulse 80   Temp 36.2 °C (97.2 °F)   Resp 17   Ht 1.791 m (5' 10.5\")   Wt 64 kg (141 lb 1.5 oz)   BMI 19.96 kg/m²     General:  WDWN, nontoxic appearing in NAD; alert, V/S as " above; disheveled; afebrile  Skin: warm and dry; good color; no rash  HEENT: NCAT; EOMs intact; PERRL; no scleral icterus; dry lips  Neck: FROM; no meningismus  Cardiovascular: Regular heart rate and rhythm.  No murmurs, rubs, or gallops  Lungs: Clear to auscultation with good air movement bilaterally.  No wheezes, rhonchi, or rales.   Abdomen: BS present; soft; NTND; no rebound, guarding, or rigidity.  No organomegaly or pulsatile mass  Extremities: GATES x 4; large open wound over the medial aspect of the patient's left lower leg and thigh regions; yellow drainage noted on bandage/gauze that was removed; no streaking, crepitus or fluctuance noted; DP pulse 1+ bilaterally  Neurologic: CNs III-XII grossly intact; speech clear; distal sensation intact  Psychiatric: Appropriate affect, normal mood    LABS  Results for orders placed or performed during the hospital encounter of 10/24/18   CBC WITH DIFFERENTIAL   Result Value Ref Range    WBC 9.3 4.8 - 10.8 K/uL    RBC 3.97 (L) 4.70 - 6.10 M/uL    Hemoglobin 10.1 (L) 14.0 - 18.0 g/dL    Hematocrit 32.6 (L) 42.0 - 52.0 %    MCV 82.1 81.4 - 97.8 fL    MCH 25.4 (L) 27.0 - 33.0 pg    MCHC 31.0 (L) 33.7 - 35.3 g/dL    RDW 52.4 (H) 35.9 - 50.0 fL    Platelet Count 439 164 - 446 K/uL    MPV 8.7 (L) 9.0 - 12.9 fL    Neutrophils-Polys 69.00 44.00 - 72.00 %    Lymphocytes 20.30 (L) 22.00 - 41.00 %    Monocytes 9.00 0.00 - 13.40 %    Eosinophils 0.30 0.00 - 6.90 %    Basophils 0.90 0.00 - 1.80 %    Immature Granulocytes 0.50 0.00 - 0.90 %    Nucleated RBC 0.00 /100 WBC    Neutrophils (Absolute) 6.38 1.82 - 7.42 K/uL    Lymphs (Absolute) 1.88 1.00 - 4.80 K/uL    Monos (Absolute) 0.83 0.00 - 0.85 K/uL    Eos (Absolute) 0.03 0.00 - 0.51 K/uL    Baso (Absolute) 0.08 0.00 - 0.12 K/uL    Immature Granulocytes (abs) 0.05 0.00 - 0.11 K/uL    NRBC (Absolute) 0.00 K/uL   CMP   Result Value Ref Range    Sodium 135 135 - 145 mmol/L    Potassium 4.2 3.6 - 5.5 mmol/L    Chloride 102 96 - 112  mmol/L    Co2 20 20 - 33 mmol/L    Anion Gap 13.0 (H) 0.0 - 11.9    Glucose 98 65 - 99 mg/dL    Bun 15 8 - 22 mg/dL    Creatinine 0.66 0.50 - 1.40 mg/dL    Calcium 8.3 (L) 8.5 - 10.5 mg/dL    AST(SGOT) 46 (H) 12 - 45 U/L    ALT(SGPT) 30 2 - 50 U/L    Alkaline Phosphatase 100 (H) 30 - 99 U/L    Total Bilirubin 0.2 0.1 - 1.5 mg/dL    Albumin 3.5 3.2 - 4.9 g/dL    Total Protein 6.9 6.0 - 8.2 g/dL    Globulin 3.4 1.9 - 3.5 g/dL    A-G Ratio 1.0 g/dL   LACTIC ACID   Result Value Ref Range    Lactic Acid 3.1 (H) 0.5 - 2.0 mmol/L   DIAGNOSTIC ALCOHOL   Result Value Ref Range    Diagnostic Alcohol 0.39 (H) 0.00 g/dL   ESTIMATED GFR   Result Value Ref Range    GFR If African American >60 >60 mL/min/1.73 m 2    GFR If Non African American >60 >60 mL/min/1.73 m 2         IMAGING  DX-FEMUR-2+ LEFT   Final Result      1.  No radiographic evidence to suggest osteomyelitis.      2.  Atherosclerosis         DX-TIBIA AND FIBULA LEFT   Final Result      No bony destruction to suggest osteomyelitis.            MEDICAL RECORD  I have reviewed patient's medical record and pertinent results are listed below.      COURSE & MEDICAL DECISION MAKING  I have reviewed any medical record information, laboratory studies and radiographic results as noted.    Bola Varela is a 63 y.o. male who presents via EMS for evaluation of intoxication and left leg wound.  Patient is afebrile and nontoxic-appearing.  He is a very poor historian and unable to say what happened to his leg although it appears to have had a prior procedure/surgery/skin graft that has now dehisced and is open with drainage.    I do not suspect necrotizing fasciitis.    Vancomycin and Rocephin were ordered.    Lactic acid is noted to be elevated to 3.1.  This may indicate sepsis.  Although his white blood cell count is 9.3 and the patient is not tachypneic or febrile.  His anion gap is also elevated to 13.  Patient is intoxicated, has a stable anemia, and elevated  transaminases.    I discussed the case with Dr. Silva from the hospitalist service who agrees to admit the patient.  X-rays were ordered to evaluate for osteomyelitis, foreign body, gas gangrene.      FINAL IMPRESSION  1. Left leg cellulitis    2. Dehydration    3. Elevated lactic acid level    4. Wound of left leg, initial encounter    5. Alcoholic intoxication without complication (HCC)        Electronically signed by: Olya Juarez, 10/24/2018 12:06 PM

## 2018-10-24 NOTE — ED TRIAGE NOTES
Patient was found by someone as he was unable to walk, they noticed the wound on his leg.  Dressing removed.  Purulent drainage from left leg.  Patient unsure how he got the wounds or who dressed it.  ETOH.

## 2018-10-25 PROCEDURE — G8978 MOBILITY CURRENT STATUS: HCPCS | Mod: CJ

## 2018-10-25 PROCEDURE — A9270 NON-COVERED ITEM OR SERVICE: HCPCS | Performed by: HOSPITALIST

## 2018-10-25 PROCEDURE — 700102 HCHG RX REV CODE 250 W/ 637 OVERRIDE(OP): Performed by: INTERNAL MEDICINE

## 2018-10-25 PROCEDURE — G8979 MOBILITY GOAL STATUS: HCPCS | Mod: CI

## 2018-10-25 PROCEDURE — 700111 HCHG RX REV CODE 636 W/ 250 OVERRIDE (IP): Performed by: HOSPITALIST

## 2018-10-25 PROCEDURE — 700102 HCHG RX REV CODE 250 W/ 637 OVERRIDE(OP): Performed by: HOSPITALIST

## 2018-10-25 PROCEDURE — A9270 NON-COVERED ITEM OR SERVICE: HCPCS | Performed by: INTERNAL MEDICINE

## 2018-10-25 PROCEDURE — 770020 HCHG ROOM/CARE - TELE (206)

## 2018-10-25 PROCEDURE — 99232 SBSQ HOSP IP/OBS MODERATE 35: CPT | Performed by: INTERNAL MEDICINE

## 2018-10-25 PROCEDURE — 97161 PT EVAL LOW COMPLEX 20 MIN: CPT

## 2018-10-25 RX ORDER — ACETAMINOPHEN 325 MG/1
650 TABLET ORAL EVERY 6 HOURS PRN
Status: DISCONTINUED | OUTPATIENT
Start: 2018-10-25 | End: 2018-10-26 | Stop reason: HOSPADM

## 2018-10-25 RX ORDER — SULFAMETHOXAZOLE AND TRIMETHOPRIM 800; 160 MG/1; MG/1
1 TABLET ORAL EVERY 12 HOURS
Status: DISCONTINUED | OUTPATIENT
Start: 2018-10-25 | End: 2018-10-26 | Stop reason: HOSPADM

## 2018-10-25 RX ADMIN — THIAMINE HCL TAB 100 MG 100 MG: 100 TAB at 05:19

## 2018-10-25 RX ADMIN — THERA TABS 1 TABLET: TAB at 05:19

## 2018-10-25 RX ADMIN — ACETAMINOPHEN 650 MG: 325 TABLET, FILM COATED ORAL at 20:21

## 2018-10-25 RX ADMIN — LORAZEPAM 0.5 MG: 2 INJECTION INTRAMUSCULAR; INTRAVENOUS at 02:11

## 2018-10-25 RX ADMIN — Medication 1 MG: at 05:19

## 2018-10-25 RX ADMIN — SULFAMETHOXAZOLE AND TRIMETHOPRIM 1 TABLET: 800; 160 TABLET ORAL at 17:47

## 2018-10-25 RX ADMIN — LORAZEPAM 1 MG: 1 TABLET ORAL at 23:05

## 2018-10-25 RX ADMIN — LORAZEPAM 1 MG: 1 TABLET ORAL at 18:14

## 2018-10-25 RX ADMIN — ENOXAPARIN SODIUM 40 MG: 100 INJECTION SUBCUTANEOUS at 05:19

## 2018-10-25 RX ADMIN — STANDARDIZED SENNA CONCENTRATE AND DOCUSATE SODIUM 2 TABLET: 8.6; 5 TABLET, FILM COATED ORAL at 05:19

## 2018-10-25 ASSESSMENT — LIFESTYLE VARIABLES
PAROXYSMAL SWEATS: BARELY PERCEPTIBLE SWEATING. PALMS MOIST
ORIENTATION AND CLOUDING OF SENSORIUM: ORIENTED AND CAN DO SERIAL ADDITIONS
TREMOR: *
VISUAL DISTURBANCES: NOT PRESENT
ORIENTATION AND CLOUDING OF SENSORIUM: ORIENTED AND CAN DO SERIAL ADDITIONS
AUDITORY DISTURBANCES: NOT PRESENT
NAUSEA AND VOMITING: NO NAUSEA AND NO VOMITING
AGITATION: *
AGITATION: SOMEWHAT MORE THAN NORMAL ACTIVITY
ORIENTATION AND CLOUDING OF SENSORIUM: ORIENTED AND CAN DO SERIAL ADDITIONS
TOTAL SCORE: 4
AUDITORY DISTURBANCES: NOT PRESENT
AUDITORY DISTURBANCES: NOT PRESENT
PAROXYSMAL SWEATS: BARELY PERCEPTIBLE SWEATING. PALMS MOIST
TOTAL SCORE: 8
HEADACHE, FULLNESS IN HEAD: NOT PRESENT
AUDITORY DISTURBANCES: NOT PRESENT
NAUSEA AND VOMITING: NO NAUSEA AND NO VOMITING
AGITATION: SOMEWHAT MORE THAN NORMAL ACTIVITY
VISUAL DISTURBANCES: NOT PRESENT
AUDITORY DISTURBANCES: NOT PRESENT
TREMOR: TREMOR NOT VISIBLE BUT CAN BE FELT, FINGERTIP TO FINGERTIP
PAROXYSMAL SWEATS: BARELY PERCEPTIBLE SWEATING. PALMS MOIST
NAUSEA AND VOMITING: NO NAUSEA AND NO VOMITING
TREMOR: TREMOR NOT VISIBLE BUT CAN BE FELT, FINGERTIP TO FINGERTIP
AUDITORY DISTURBANCES: NOT PRESENT
AGITATION: MODERATELY FIDGETY AND RESTLESS
TREMOR: *
TREMOR: *
HEADACHE, FULLNESS IN HEAD: NOT PRESENT
VISUAL DISTURBANCES: NOT PRESENT
ANXIETY: NO ANXIETY (AT EASE)
TOTAL SCORE: 8
PAROXYSMAL SWEATS: BARELY PERCEPTIBLE SWEATING. PALMS MOIST
NAUSEA AND VOMITING: NO NAUSEA AND NO VOMITING
AGITATION: NORMAL ACTIVITY
PAROXYSMAL SWEATS: BARELY PERCEPTIBLE SWEATING. PALMS MOIST
AGITATION: *
ANXIETY: NO ANXIETY (AT EASE)
ORIENTATION AND CLOUDING OF SENSORIUM: ORIENTED AND CAN DO SERIAL ADDITIONS
ANXIETY: *
NAUSEA AND VOMITING: NO NAUSEA AND NO VOMITING
ORIENTATION AND CLOUDING OF SENSORIUM: ORIENTED AND CAN DO SERIAL ADDITIONS
HEADACHE, FULLNESS IN HEAD: NOT PRESENT
PAROXYSMAL SWEATS: BARELY PERCEPTIBLE SWEATING. PALMS MOIST
NAUSEA AND VOMITING: NO NAUSEA AND NO VOMITING
VISUAL DISTURBANCES: NOT PRESENT
HEADACHE, FULLNESS IN HEAD: VERY MILD
ANXIETY: MILDLY ANXIOUS
HEADACHE, FULLNESS IN HEAD: NOT PRESENT
HEADACHE, FULLNESS IN HEAD: NOT PRESENT
ORIENTATION AND CLOUDING OF SENSORIUM: ORIENTED AND CAN DO SERIAL ADDITIONS
TOTAL SCORE: 3
VISUAL DISTURBANCES: NOT PRESENT
TOTAL SCORE: 8
ANXIETY: MILDLY ANXIOUS
TREMOR: *
VISUAL DISTURBANCES: NOT PRESENT
ANXIETY: *
TOTAL SCORE: 4

## 2018-10-25 ASSESSMENT — ENCOUNTER SYMPTOMS
COUGH: 0
DEPRESSION: 0
NAUSEA: 0
BACK PAIN: 0
DIZZINESS: 0
DOUBLE VISION: 0
MYALGIAS: 1
CHILLS: 0
VOMITING: 0
HEADACHES: 0
PALPITATIONS: 0
SHORTNESS OF BREATH: 0
BLURRED VISION: 0
FEVER: 0

## 2018-10-25 ASSESSMENT — COGNITIVE AND FUNCTIONAL STATUS - GENERAL
MOBILITY SCORE: 21
SUGGESTED CMS G CODE MODIFIER MOBILITY: CJ
CLIMB 3 TO 5 STEPS WITH RAILING: A LITTLE
MOVING TO AND FROM BED TO CHAIR: A LITTLE
WALKING IN HOSPITAL ROOM: A LITTLE

## 2018-10-25 ASSESSMENT — GAIT ASSESSMENTS
GAIT LEVEL OF ASSIST: CONTACT GUARD ASSIST
DISTANCE (FEET): 40

## 2018-10-25 ASSESSMENT — PAIN SCALES - GENERAL
PAINLEVEL_OUTOF10: 4
PAINLEVEL_OUTOF10: 5
PAINLEVEL_OUTOF10: 3

## 2018-10-25 NOTE — THERAPY
"Physical Therapy Evaluation completed.   Bed Mobility:  Supine to Sit: Supervised  Transfers: Sit to Stand: Supervised  Gait: Level Of Assist: Contact Guard Assist (close SBA) with No Equipment Needed       Plan of Care: Will benefit from Physical Therapy 2 times per week  Discharge Recommendations: Equipment: Will Continue to Assess for Equipment Needs. See below    Pt presents to PT with impaired balance and gait mechanics associated wtih recent deconditioning and medical co-morbidities. He is able to demonstrate short distance ambulation without AD with no Yeyo LOB. He appears capable of increased distances though was limited 2' to general safety concerns and current impulsivity (though this may be baseline and noted pt admitted recently with ADRIAN of .39). Will continue to visit while here though anticipate with icnreased OOB activity with both PT and staff he will continue to self progress as co-morbidities managed and be functionally capable of dc to prior environment once medically cleared.     See \"Rehab Therapy-Acute\" Patient Summary Report for complete documentation.     "

## 2018-10-25 NOTE — PROGRESS NOTES
2 RN skin check with Rhianna TOBAR    Generalized redness  Wound to posterior head, w/staples  2 large scabs to L upper extremity  Scattered scabs to R upper extremity.   Large scab/redness/skin breakdown to L lower extremity.  2+ edema to L lower extremity  Sacrum red and blanching (mepilex applied)  Waffle mattress applied.  Will put pictures into

## 2018-10-25 NOTE — WOUND TEAM
"[facility]  Inpatient Services  Initial Wound and Skin Care Evaluation    Admission Date: 10/24/2018     Consult Date: 10/25/2018 @ 1220   HPI, PMH, SH: Reviewed    Unit where seen by Wound Team: [id number]     WOUND CONSULT RELATED TO:  Full thickness wounds to LLE     SUBJECTIVE:  \"I can go home tomorrow, when?\"      Self Report / Pain Level: \" hurts some\"       OBJECTIVE:  Patient sitting in chair, xeroform and gauze dressing in place    WOUND TYPE, LOCATION, CHARACTERISTICS (Pressure Injuries: location, stage, POA or date identified)                   Wound 10/24/18 Leg left medial, posterior full thickness (Active)   Wound Image   Posterior aspect not photographed   Site Assessment Pink;Red;Yellow;Brown    Meredith-wound Assessment Dry;Intact;Red    Margins Attached edges    Tunneling 0 cm    Undermining 0 cm    Closure None    Drainage Amount Scant    Drainage Description Sanguineous    Non-staged Wound Description Full thickness    Treatments Cleansed    Cleansing Approved Wound Cleanser    Periwound Protectant Not Applicable    Dressing Options Hydrogel;Nonadherent Contact Layer;Absorbent Abdominal Pad;Roll Gauze;Tape    Dressing Cleansing/Solutions Not Applicable    Dressing Changed New    Dressing Status Clean;Dry;Intact    Dressing Change Frequency Daily    NEXT Dressing Change  10/26/18    NEXT Weekly Photo (Inpatient Only) 11/01/18    Odor None    Pulses 2+;Right;Left;DP;PT    Exposed Structures None    Tissue Type and Percentage mixture of yellow, brown, pink/red tissue        Vascular:    Dorsal Pedal pulses:  2+, left, right  Posterior tib pulses:   2+, left, right    KOSTAS:      not indicated, strong palpable pulses    Lab Values:    WBC:       WBC   Date/Time Value Ref Range Status   10/24/2018 12:00 PM 9.3 4.8 - 10.8 K/uL Final     AIC:      Lab Results   Component Value Date/Time    HBA1C 5.7 (H) 08/22/2018 07:48 PM         Culture: deferred, no clinical s/s of infection    INTERVENTIONS " "BY WOUND TEAM:  Patient to bed, dressing removed. Leg cleansed with no rinse foam cleanser and moist wash cloth, dried. Pedal pulse assessment complete, see above. CSWD with scissors and forceps to remove loose non viable tissue at wound edges, > 20 cm2, scant bleeding noted, controlled with manual pressure. Prominent medial wound photographed post debridement, wound does extend out to posterior thigh and behind the knee. wound difficult to measure due to irregular shape, wound is 25% of total lower leg. Wounds cleansed with wound cleanser.  Wound dress (hydrogel) over all open wounds, covered with telfa, ABD pads, then secured in place with roll gauze and tape.    Dressing selection:  Hydrogel, telfa, ABD pad, roll gauze, tape         Interdisciplinary consultation: Patient, Bedside RN (Jeannie), Dr. Hutchinson at bedside at time of dressing change. MD will collaborate with  for outpatient wound clinic referral and bus pas to help patient get to appointments as scheduled. Staff RN Jeannie gave patient a map of location of wound clinic.    EVALUATION: Patient seen by wound team on previous admissions for the same wound. Originally in May of this year he had surgery at [facility] for necrotizing fasciitis to this leg. Patient had last been seen in the wound clinic in August of this year for wound care, but did not make any of his follow up appointments. Patient states that he is homeless, does not sleep at the [facility] shelter but states, \" I have a blanket and camp out.\" Wound should continue to resolve with proper wound care, however, this is difficult to achieve due to patient's history of non compliance, memory loss, and ETOH abuse. Educated patient on how to change dressing himself and given left over supplies to take with him upon discharge. Encouraged patient strongly to keep appointments. Patient repeatedly asked when he could be discharged and never remembered the answer given to him (\" the doctor is working on " "it, we don't know yet\").     Factors affecting wound healing: non compliance, memory loss, ETOH abuse, homelessness, lack of resources    Goals: Steady decrease in wound area and depth weekly.    NURSING PLAN OF CARE ORDERS (X):    Dressing changes: See Dressing Care orders:  X  Skin care: See Skin Care orders:   Rectal tube care: See Rectal Tube Care orders:   Other orders:    RSKIN: CURRENT (X) ORDERED (O):   Q shift [place]:  X  Q shift pressure point assessments:  X  Pressure redistribution mattress  X          NANCY          Bariatric NANCY         Bariatric foam           Heel float boots          Float Heels off Bed with Pillows  Patient is independent with bed mobility             Barrier wipes         Barrier Cream         Barrier paste          Sacral silicone dressing         Silicone O2 tubing         Anchorfast         Cannula fixation Device (Tender )          Gray Foam Ear protectors           Trach with Optifoam split foam                 Waffle cushion        Waffle Overlay        Rectal tube or BMS         Antifungal tx      Interdry          Turn q 2 hours    See above     Up to chair        Ambulate      PT/OT        Dietician        Diabetes Education      PO X    TF     TPN     NPO   # days   Other        WOUND TEAM PLAN OF CARE (X):   NPWT change 3 x week:        Dressing changes by wound team:       Follow up as needed:  X     Other (explain):     Anticipated discharge plans (X):   SNF:           Home Care:           Outpatient Wound Center: X           Self Care:            Other:             "

## 2018-10-25 NOTE — H&P
Hospital Medicine History & Physical Note    Date of Service  10/24/2018    Primary Care Physician  Bharti Ramirez M.D.    Consultants  Wound care    Code Status  Full code by default    Chief Complaint  Unable to walk    History of Presenting Illness  63 y.o. male who presented 10/24/2018 with unsteady gait. Mr. Varela has a past medical history of severe alcohol abuse had necrotizing fasciitis debridement by Dr. Alvares at [facility] in May 2018.  Was treated with IV antibiotics via a PICC line there.  Is been followed up at the wound care clinic most recently admitted here from 8/22/2018 through 8/28/2018 because of pain in the leg and cellulitis.  He was brought in today because apparently he was noted to be tumbling around here in the emergency room is found to have his leg wrapped in gauze that was quite malodorous.  Apparently he had a band on his wrist that stated that he was at [facility] recently.  Requested records from [facility] and they have not been secured yet.  His blood alcohol level 0.39 and he cannot give a history because of gross intoxication.  He will be admitted monitor for severe detox which is likely wound care will be consulted for ongoing treatment of his leg.    Review of Systems  Review of Systems   Unable to perform ROS: Mental acuity       Past Medical History   has a past medical history of Psychiatric problem; Restless leg; and Tobacco use.    Surgical History   has a past surgical history that includes ankle orif (Right).  May 2018 for necrotizing fasciitis    Family History  family history includes Heart Disease in his mother; No Known Problems in his father.     Social History  This cannot be obtained but clearly he drinks substantial amounts of alcohol    Allergies  No Known Allergies    Medications  None       Physical Exam  Temp:  [36.2 °C (97.2 °F)-37.8 °C (100 °F)] 37.8 °C (100 °F)  Pulse:  [74-95] 95  Resp:  [17-18] 18  BP: (104-126)/(59-74) 126/74    Physical  Exam   Constitutional: No distress.   Thin, dirty, and disheveled.    HENT:   Poor dentition   Neck: Neck supple.   Cardiovascular: Normal rate and regular rhythm.    No murmur heard.  Pulmonary/Chest: Effort normal. No respiratory distress. He has no wheezes.   Abdominal: Soft. He exhibits no distension. There is no tenderness.   Musculoskeletal: He exhibits no edema.   On the left leg there is a large scar the proximal and distal aspect appears with a well-healed in between that there is a very large scab with crusting though no acute redness no exudate.  He has no fluctuance and this is not tender.  Bruises on his arms   Neurological:   He is quite intoxicated and a non-historian oriented only to person   Skin: Skin is warm and dry.   Nursing note and vitals reviewed.      Laboratory:  Recent Labs      10/24/18   1200   WBC  9.3   RBC  3.97*   HEMOGLOBIN  10.1*   HEMATOCRIT  32.6*   MCV  82.1   MCH  25.4*   MCHC  31.0*   RDW  52.4*   PLATELETCT  439   MPV  8.7*     Recent Labs      10/24/18   1200   SODIUM  135   POTASSIUM  4.2   CHLORIDE  102   CO2  20   GLUCOSE  98   BUN  15   CREATININE  0.66   CALCIUM  8.3*     Recent Labs      10/24/18   1200   ALTSGPT  30   ASTSGOT  46*   ALKPHOSPHAT  100*   TBILIRUBIN  0.2   GLUCOSE  98                 No results for input(s): TROPONINI in the last 72 hours.    Urinalysis:    Recent Labs      10/24/18   1315   SPECGRAVITY  1.009   GLUCOSEUR  Negative   KETONES  Negative   NITRITE  Negative   LEUKESTERAS  Trace*   WBCURINE  5-10*   EPITHELCELL  Rare        Imaging:  DX-FEMUR-2+ LEFT   Final Result      1.  No radiographic evidence to suggest osteomyelitis.      2.  Atherosclerosis         DX-TIBIA AND FIBULA LEFT   Final Result      No bony destruction to suggest osteomyelitis.      [id number] (W/O CXR)    (Results Pending)         Assessment/Plan:  I anticipate this patient will require at least two midnights for appropriate medical management, necessitating  inpatient admission.    * Alcohol intoxication with delirium (HCC)- (present on admission)   Assessment & Plan    His blood alcohol level in the emergency room is 0.39  Currently grossly intoxicated and unable to give a history  Is a very high risk of detox and therefore the CIWA protocol will be initiated with IV ativan.        Open wound of left lower leg- (present on admission)   Assessment & Plan    He has a history of necrotizing fasciitis  He was recently at [facility] we have requested the records sent from there  When the bandage was removed, the emergency room physician and nurse noted that there was an exudate that is quite malodorous possibly infected.  Only, it does not appear to be infected though wound care is being consulted for further evaluation.        Tobacco use- (present on admission)   Assessment & Plan    Nicotine patch is being offered            VTE prophylaxis: lovenox

## 2018-10-25 NOTE — PROGRESS NOTES
"1840: Pt arrived from ED.  VSS.  AOx4, goes by his nickname \"[place]\".  Very pleasant, but becomes tearful very easily.  Multiple scabs observed with large wound to LLE. Pt oriented to room.  All questions answered.  Bed locked in lowest position. Pt educated to call for assistance. Bed alarm on.   "

## 2018-10-25 NOTE — ASSESSMENT & PLAN NOTE
He has a history of necrotizing fasciitis  He was recently at [facility] we have requested the records sent from there  Seen by wound care doesn't look infected will try to arrange wound care as outpt

## 2018-10-25 NOTE — CARE PLAN
Problem: Communication  Goal: The ability to communicate needs accurately and effectively will improve  Outcome: PROGRESSING SLOWER THAN EXPECTED  Discussed use of the call light, pt verbalizes understanding and demonstrates appropriate use.

## 2018-10-25 NOTE — PROGRESS NOTES
Assumed care of pt. He is alert and oriented. Discussed POC with pt, he was distracted by the football game and asked myself and the dayshift RN to move out of the way. Obviously, no questions. Discussed safety, pt verbalized understanding but does not demonstrate. He is showing impulsive behaviors and getting out of bed without using his call light. Bed is locked in lowest position and call light/personal belongings are within reach. Waffle mattress applied, skin check complete.

## 2018-10-25 NOTE — PROGRESS NOTES
[facility] Hospitalist Progress Note    Date of Service: 10/25/2018    Chief Complaint  63 y.o. male admitted 10/24/2018 with alcohol intoxication     Interval Problem Update  Pt seen and examined, no overnight events, afebrile, on CIwa protocol,   Wound care for his [facility] wound     Consultants/Specialty  None     Disposition  Home when medically cleared         Review of Systems   Constitutional: Negative for chills and fever.   HENT: Negative for congestion and ear discharge.    Eyes: Negative for blurred vision and double vision.   Respiratory: Negative for cough and shortness of breath.    Cardiovascular: Negative for chest pain and palpitations.   Gastrointestinal: Negative for nausea and vomiting.   Genitourinary: Negative for dysuria and urgency.   Musculoskeletal: Positive for myalgias. Negative for back pain.   Skin: Negative for rash.   Neurological: Negative for dizziness and headaches.   Psychiatric/Behavioral: Negative for depression.      Physical Exam  Laboratory/Imaging   Hemodynamics  Temp (24hrs), Av.2 °C (98.9 °F), Min:36.5 °C (97.7 °F), Max:37.8 °C (100 °F)   Temperature: 36.8 °C (98.3 °F)  Pulse  Av.9  Min: 74  Max: 108    Blood Pressure: 127/82      Respiratory      Respiration: 16, Pulse Oximetry: 96 %        RUL Breath Sounds: Clear, RML Breath Sounds: Clear, RLL Breath Sounds: Diminished, ROSALIA Breath Sounds: Clear, LLL Breath Sounds: Diminished    Fluids    Intake/Output Summary (Last 24 hours) at 10/25/18 1355  Last data filed at 10/25/18 1200   Gross per 24 hour   Intake              200 ml   Output             1750 ml   Net            -1550 ml       Nutrition  Orders Placed This Encounter   Procedures   • Diet Order Regular     Standing Status:   Standing     Number of Occurrences:   1     Order Specific Question:   Diet:     Answer:   Regular [1]     Physical Exam   Constitutional: He is oriented to person, place, and time.   HENT:   Head: Normocephalic and atraumatic.   Eyes:  Conjunctivae are normal.   Neck: Neck supple. No JVD present.   Cardiovascular: Normal rate.  Exam reveals no gallop.    Pulmonary/Chest: He has no wheezes. He has no rales.   Abdominal: Soft. Bowel sounds are normal. He exhibits no distension. There is no tenderness.   Musculoskeletal: He exhibits no edema.   Neurological: He is alert and oriented to person, place, and time. No cranial nerve deficit.   Skin: Skin is warm.    large scar the proximal and distal aspect appears with a well-healed in between that there is a very large scab with crusting though no acute redness    Nursing note and vitals reviewed.      Recent Labs      10/24/18   1200   WBC  9.3   RBC  3.97*   HEMOGLOBIN  10.1*   HEMATOCRIT  32.6*   MCV  82.1   MCH  25.4*   MCHC  31.0*   RDW  52.4*   PLATELETCT  439   MPV  8.7*     Recent Labs      10/24/18   1200   SODIUM  135   POTASSIUM  4.2   CHLORIDE  102   CO2  20   GLUCOSE  98   BUN  15   CREATININE  0.66   CALCIUM  8.3*                      Assessment/Plan     * Alcohol intoxication with delirium (HCC)- (present on admission)   Assessment & Plan    Presented with Blood alcohol of 0.39  Cont on CIWA protocol   Monitor electrolytes         Open wound of left lower leg- (present on admission)   Assessment & Plan    He has a history of necrotizing fasciitis  He was recently at [facility] we have requested the records sent from there  Seen by wound care doesn't look infected will try to arrange wound care as outpt         Tobacco use- (present on admission)   Assessment & Plan    Nicotine patch is being offered  Counseled on cessation           Quality-Core Measures   Reviewed items::  Labs reviewed, Medications reviewed and Radiology images reviewed  Mcleod catheter::  No Mcleod  DVT prophylaxis pharmacological::  Heparin  Assessed for rehabilitation services:  Patient was assess for and/or received rehabilitation services during this hospitalization

## 2018-10-25 NOTE — PROGRESS NOTES
Assumed care at 0715, bedside report received from night shift RN. Pt on the monitor. Orders reviewed, call light within reach, bed alarm in use, and hourly rounding in place.

## 2018-10-25 NOTE — CARE PLAN
Problem: Communication  Goal: The ability to communicate needs accurately and effectively will improve  Outcome: PROGRESSING AS EXPECTED    Intervention: Educate patient and significant other/support system about the plan of care, procedures, treatments, medications and allow for questions  Patient has been educated on plan of care, treatment, and medication. Patient has been able to communicate questions and needs accurately and effectively.      Problem: Safety  Goal: Will remain free from falls  Outcome: PROGRESSING AS EXPECTED    Intervention: Assess risk factors for falls  Patients fall risk has been assessed and fall precautions have been put in place. Patient has remained free from falls during shift.

## 2018-10-26 VITALS
OXYGEN SATURATION: 93 % | WEIGHT: 164.68 LBS | HEART RATE: 75 BPM | TEMPERATURE: 98.6 F | BODY MASS INDEX: 23.06 KG/M2 | DIASTOLIC BLOOD PRESSURE: 99 MMHG | HEIGHT: 71 IN | RESPIRATION RATE: 18 BRPM | SYSTOLIC BLOOD PRESSURE: 162 MMHG

## 2018-10-26 LAB
ANION GAP SERPL CALC-SCNC: 9 MMOL/L (ref 0–11.9)
BACTERIA WND AEROBE CULT: ABNORMAL
BUN SERPL-MCNC: 17 MG/DL (ref 8–22)
CALCIUM SERPL-MCNC: 8.6 MG/DL (ref 8.5–10.5)
CHLORIDE SERPL-SCNC: 105 MMOL/L (ref 96–112)
CO2 SERPL-SCNC: 23 MMOL/L (ref 20–33)
CREAT SERPL-MCNC: 0.91 MG/DL (ref 0.5–1.4)
ERYTHROCYTE [DISTWIDTH] IN BLOOD BY AUTOMATED COUNT: 51 FL (ref 35.9–50)
GLUCOSE SERPL-MCNC: 109 MG/DL (ref 65–99)
GRAM STN SPEC: ABNORMAL
HCT VFR BLD AUTO: 26.7 % (ref 42–52)
HGB BLD-MCNC: 8.5 G/DL (ref 14–18)
MAGNESIUM SERPL-MCNC: 1.7 MG/DL (ref 1.5–2.5)
MCH RBC QN AUTO: 25.4 PG (ref 27–33)
MCHC RBC AUTO-ENTMCNC: 31.8 G/DL (ref 33.7–35.3)
MCV RBC AUTO: 79.7 FL (ref 81.4–97.8)
PLATELET # BLD AUTO: 384 K/UL (ref 164–446)
PMV BLD AUTO: 8.9 FL (ref 9–12.9)
POTASSIUM SERPL-SCNC: 4.2 MMOL/L (ref 3.6–5.5)
RBC # BLD AUTO: 3.35 M/UL (ref 4.7–6.1)
SIGNIFICANT IND 70042: ABNORMAL
SITE SITE: ABNORMAL
SODIUM SERPL-SCNC: 137 MMOL/L (ref 135–145)
SOURCE SOURCE: ABNORMAL
WBC # BLD AUTO: 6.4 K/UL (ref 4.8–10.8)

## 2018-10-26 PROCEDURE — 700111 HCHG RX REV CODE 636 W/ 250 OVERRIDE (IP): Performed by: HOSPITALIST

## 2018-10-26 PROCEDURE — 700111 HCHG RX REV CODE 636 W/ 250 OVERRIDE (IP): Performed by: SPECIALIST

## 2018-10-26 PROCEDURE — 85027 COMPLETE CBC AUTOMATED: CPT

## 2018-10-26 PROCEDURE — 700105 HCHG RX REV CODE 258: Performed by: HOSPITALIST

## 2018-10-26 PROCEDURE — 700102 HCHG RX REV CODE 250 W/ 637 OVERRIDE(OP): Performed by: HOSPITALIST

## 2018-10-26 PROCEDURE — 700102 HCHG RX REV CODE 250 W/ 637 OVERRIDE(OP): Performed by: INTERNAL MEDICINE

## 2018-10-26 PROCEDURE — 36415 COLL VENOUS BLD VENIPUNCTURE: CPT

## 2018-10-26 PROCEDURE — 83735 ASSAY OF MAGNESIUM: CPT

## 2018-10-26 PROCEDURE — A9270 NON-COVERED ITEM OR SERVICE: HCPCS | Performed by: HOSPITALIST

## 2018-10-26 PROCEDURE — 99239 HOSP IP/OBS DSCHRG MGMT >30: CPT | Performed by: INTERNAL MEDICINE

## 2018-10-26 PROCEDURE — 80048 BASIC METABOLIC PNL TOTAL CA: CPT

## 2018-10-26 PROCEDURE — A9270 NON-COVERED ITEM OR SERVICE: HCPCS | Performed by: INTERNAL MEDICINE

## 2018-10-26 RX ORDER — AMOXICILLIN AND CLAVULANATE POTASSIUM 875; 125 MG/1; MG/1
1 TABLET, FILM COATED ORAL EVERY 12 HOURS
Qty: 14 TAB | Refills: 0 | Status: ON HOLD
Start: 2018-10-26 | End: 2018-11-17

## 2018-10-26 RX ORDER — AMOXICILLIN AND CLAVULANATE POTASSIUM 875; 125 MG/1; MG/1
1 TABLET, FILM COATED ORAL EVERY 12 HOURS
Status: DISCONTINUED | OUTPATIENT
Start: 2018-10-26 | End: 2018-10-26 | Stop reason: HOSPADM

## 2018-10-26 RX ORDER — KETOROLAC TROMETHAMINE 30 MG/ML
30 INJECTION, SOLUTION INTRAMUSCULAR; INTRAVENOUS ONCE
Status: COMPLETED | OUTPATIENT
Start: 2018-10-26 | End: 2018-10-26

## 2018-10-26 RX ORDER — SULFAMETHOXAZOLE AND TRIMETHOPRIM 800; 160 MG/1; MG/1
1 TABLET ORAL EVERY 12 HOURS
Qty: 14 TAB | Refills: 0 | Status: ON HOLD
Start: 2018-10-26 | End: 2018-11-17

## 2018-10-26 RX ADMIN — THIAMINE HCL TAB 100 MG 100 MG: 100 TAB at 05:12

## 2018-10-26 RX ADMIN — THERA TABS 1 TABLET: TAB at 05:12

## 2018-10-26 RX ADMIN — SODIUM CHLORIDE: 9 INJECTION, SOLUTION INTRAVENOUS at 05:08

## 2018-10-26 RX ADMIN — SULFAMETHOXAZOLE AND TRIMETHOPRIM 1 TABLET: 800; 160 TABLET ORAL at 05:12

## 2018-10-26 RX ADMIN — KETOROLAC TROMETHAMINE 30 MG: 30 INJECTION, SOLUTION INTRAMUSCULAR at 01:57

## 2018-10-26 RX ADMIN — STANDARDIZED SENNA CONCENTRATE AND DOCUSATE SODIUM 2 TABLET: 8.6; 5 TABLET, FILM COATED ORAL at 05:12

## 2018-10-26 RX ADMIN — Medication 1 MG: at 05:12

## 2018-10-26 RX ADMIN — ENOXAPARIN SODIUM 40 MG: 100 INJECTION SUBCUTANEOUS at 05:15

## 2018-10-26 RX ADMIN — AMOXICILLIN AND CLAVULANATE POTASSIUM 1 TABLET: 875; 125 TABLET, FILM COATED ORAL at 11:25

## 2018-10-26 ASSESSMENT — LIFESTYLE VARIABLES
VISUAL DISTURBANCES: NOT PRESENT
ANXIETY: MILDLY ANXIOUS
HEADACHE, FULLNESS IN HEAD: NOT PRESENT
VISUAL DISTURBANCES: NOT PRESENT
PAROXYSMAL SWEATS: NO SWEAT VISIBLE
NAUSEA AND VOMITING: NO NAUSEA AND NO VOMITING
AGITATION: SOMEWHAT MORE THAN NORMAL ACTIVITY
TOTAL SCORE: 5
ANXIETY: MILDLY ANXIOUS
ORIENTATION AND CLOUDING OF SENSORIUM: ORIENTED AND CAN DO SERIAL ADDITIONS
ORIENTATION AND CLOUDING OF SENSORIUM: ORIENTED AND CAN DO SERIAL ADDITIONS
AUDITORY DISTURBANCES: NOT PRESENT
TREMOR: TREMOR NOT VISIBLE BUT CAN BE FELT, FINGERTIP TO FINGERTIP
VISUAL DISTURBANCES: NOT PRESENT
NAUSEA AND VOMITING: NO NAUSEA AND NO VOMITING
HEADACHE, FULLNESS IN HEAD: NOT PRESENT
NAUSEA AND VOMITING: NO NAUSEA AND NO VOMITING
AGITATION: SOMEWHAT MORE THAN NORMAL ACTIVITY
AUDITORY DISTURBANCES: NOT PRESENT
PAROXYSMAL SWEATS: BARELY PERCEPTIBLE SWEATING. PALMS MOIST
TOTAL SCORE: 4
AGITATION: *
ANXIETY: *
TOTAL SCORE: 3
VISUAL DISTURBANCES: NOT PRESENT
TREMOR: NO TREMOR
AGITATION: SOMEWHAT MORE THAN NORMAL ACTIVITY
HEADACHE, FULLNESS IN HEAD: NOT PRESENT
HEADACHE, FULLNESS IN HEAD: NOT PRESENT
NAUSEA AND VOMITING: NO NAUSEA AND NO VOMITING
ORIENTATION AND CLOUDING OF SENSORIUM: ORIENTED AND CAN DO SERIAL ADDITIONS
PAROXYSMAL SWEATS: NO SWEAT VISIBLE
ORIENTATION AND CLOUDING OF SENSORIUM: ORIENTED AND CAN DO SERIAL ADDITIONS
AUDITORY DISTURBANCES: NOT PRESENT
TREMOR: NO TREMOR
PAROXYSMAL SWEATS: BARELY PERCEPTIBLE SWEATING. PALMS MOIST
ANXIETY: *
AUDITORY DISTURBANCES: NOT PRESENT
TREMOR: TREMOR NOT VISIBLE BUT CAN BE FELT, FINGERTIP TO FINGERTIP
TOTAL SCORE: 3

## 2018-10-26 ASSESSMENT — PAIN SCALES - GENERAL
PAINLEVEL_OUTOF10: 5
PAINLEVEL_OUTOF10: 0

## 2018-10-26 NOTE — PROGRESS NOTES
Tech from Lab called with critical result of positive wound culture MRSA and beta strep group A at 1015. Critical lab result read back to tech.   Dr. Hutchinson notified of critical lab result at 1030.  Critical lab result read back by Dr. Hutchinson.

## 2018-10-26 NOTE — PROGRESS NOTES
Bedside report received from night shift RN. Assumed care. Pt is A&O x 4, Pt is in resting in bed. Pt denies pain at this time. Pt was updated on plan of care. Pt has call light, personal belongings, and bedside table within reach. Bed is in the lowest position and bed alarm is on. Will continue to monitor.

## 2018-10-26 NOTE — DISCHARGE INSTRUCTIONS
Discharge Instructions    Discharged to home by taxi with self. Discharged via walking, hospital escort: Refused.  Special equipment needed: Not Applicable    Be sure to schedule a follow-up appointment with your primary care doctor or any specialists as instructed.     Discharge Plan:   Smoking Cessation Offered: Patient Refused  Influenza Vaccine Indication: Indicated: 9 to 64 years of age    I understand that a diet low in cholesterol, fat, and sodium is recommended for good health. Unless I have been given specific instructions below for another diet, I accept this instruction as my diet prescription.     Special Instructions: None    · Is patient discharged on Warfarin / Coumadin?   No     Alcohol Intoxication  Alcohol intoxication happens when a person cannot think clearly or function well (becomes impaired) after drinking alcohol. This condition can happen even after one drink. It can be dangerous, especially if:  · The person drank a lot of alcohol in a short amount of time (binge drinking).  · The person also took certain medicines or drugs.  If the intoxication is very bad, a breathing machine (ventilator) may be needed until the alcohol wears off.  Follow these instructions at home:  General instructions  · Do not drive after drinking alcohol.  · Have someone stay with you. You should not be left alone while you have this condition.  · Make sure you have enough fluid in your body. To do this:  ¨ Drink enough fluid to keep your pee (urine) clear or pale yellow.  ¨ Avoid caffeine. It can make you thirsty.  · Take over-the-counter and prescription medicines only as told by your doctor.  To prevent this condition:  · Limit alcohol intake to no more than 1 drink a day for nonpregnant women and 2 drinks a day for men. One drink equals 12 oz of beer, 5 oz of wine, or 1½ oz of hard liquor.  · Do not drink alcohol on an empty stomach.  · Avoid drinking alcohol if:  ¨ You are under the legal drinking age.  ¨ You  are pregnant or may be pregnant.  ¨ You are taking medicines that you should not take with alcohol.  ¨ Your drinking causes your medical condition to get worse.  ¨ You need to drive or do activities that require your attention.  ¨ You have substance use disorder.  If this condition happens again:  · Get help right away if you or someone you know:  ¨ Has medium or very bad trouble with:  § Movement (coordination).  § Speech.  § Memory.  § Attention.  ¨ Passes out (faints).  ¨ Is confused.  ¨ Is throwing up (vomiting).  · Do not leave someone with this condition alone.  Contact a doctor if:  · You do not feel better after a few days.  · You are having problems at work, at school, or at home because of drinking.  Get help right away if:  · You get shaky when you try to stop drinking.  · You start shaking and you cannot control it (have a seizure).  · You throw up blood. The blood may be bright red or look like coffee grounds.  · You see blood in your poop (stool). The blood may:  ¨ Be bright red.  ¨ Make your poop black and tarry and make it smell bad.  · You start to feel light-headed.  · You pass out.  If you ever feel like you may hurt yourself or others, or have thoughts about taking your own life, get help right away. You can go to your nearest emergency department or call:  · Your local emergency services (911 in the [place]  · A suicide crisis helpline, such as the National Suicide Prevention Lifeline at 1-645.133.9420. This is open 24 hours a day.  This information is not intended to replace advice given to you by your health care provider. Make sure you discuss any questions you have with your health care provider.  Document Released: 06/05/2009 Document Revised: 09/07/2017 Document Reviewed: 09/07/2017  Elsevier Interactive Patient Education © 2017 Else[employer] Inc.        Wound Care, Adult  Taking care of your wound properly can help to prevent pain and infection. It can also help your wound to heal more  quickly.  How is this treated?  Wound care  · Follow instructions from your health care provider about how to take care of your wound. Make sure you:  ¨ Wash your hands with soap and water before you change the bandage (dressing). If soap and water are not available, use hand .  ¨ Change your dressing as told by your health care provider.  ¨ Leave stitches (sutures), skin glue, or adhesive strips in place. These skin closures may need to stay in place for 2 weeks or longer. If adhesive strip edges start to loosen and curl up, you may trim the loose edges. Do not remove adhesive strips completely unless your health care provider tells you to do that.  · Check your wound area every day for signs of infection. Check for:  ¨ More redness, swelling, or pain.  ¨ More fluid or blood.  ¨ Warmth.  ¨ Pus or a bad smell.  · Ask your health care provider if you should clean the wound with mild soap and water. Doing this may include:  ¨ Using a clean towel to pat the wound dry after cleaning it. Do not rub or scrub the wound.  ¨ Applying a cream or ointment. Do this only as told by your health care provider.  ¨ Covering the incision with a clean dressing.  · Ask your health care provider when you can leave the wound uncovered.  Medicines   · If you were prescribed an antibiotic medicine, cream, or ointment, take or use the antibiotic as told by your health care provider. Do not stop taking or using the antibiotic even if your condition improves.  · Take over-the-counter and prescription medicines only as told by your health care provider. If you were prescribed pain medicine, take it at least 30 minutes before doing any wound care or as told by your health care provider.  General instructions  · Return to your normal activities as told by your health care provider. Ask your health care provider what activities are safe.  · Do not scratch or pick at the wound.  · Keep all follow-up visits as told by your health care  provider. This is important.  · Eat a diet that includes protein, vitamin A, vitamin C, and other nutrient-rich foods. These help the wound heal:  ¨ Protein-rich foods include meat, dairy, beans, nuts, and other sources.  ¨ Vitamin A-rich foods include carrots and dark green, leafy vegetables.  ¨ Vitamin C-rich foods include citrus, tomatoes, and other fruits and vegetables.  ¨ Nutrient-rich foods have protein, carbohydrates, fat, vitamins, or minerals. Eat a variety of healthy foods including vegetables, fruits, and whole grains.  Contact a health care provider if:  · You received a tetanus shot and you have swelling, severe pain, redness, or bleeding at the injection site.  · Your pain is not controlled with medicine.  · You have more redness, swelling, or pain around the wound.  · You have more fluid or blood coming from the wound.  · Your wound feels warm to the touch.  · You have pus or a bad smell coming from the wound.  · You have a fever or chills.  · You are nauseous or you vomit.  · You are dizzy.  Get help right away if:  · You have a red streak going away from your wound.  · The edges of the wound open up and separate.  · Your wound is bleeding and the bleeding does not stop with gentle pressure.  · You have a rash.  · You faint.  · You have trouble breathing.  This information is not intended to replace advice given to you by your health care provider. Make sure you discuss any questions you have with your health care provider.  Document Released: 09/26/2009 Document Revised: 08/16/2017 Document Reviewed: 07/04/2017  [employer] Interactive Patient Education © 2017 [employer]      MRSA Infection, Adult  MRSA stands for methicillin-resistant Staphylococcus aureus. This type of infection is caused by Staphylococcus aureus bacteria that are no longer affected by the medicines used to kill them (drug resistant). Staphylococcus (staph) bacteria are normally found on the skin or in the nose of healthy people.  In most cases, these bacteria do not cause infection. But if these resistant bacteria enter your body through a cut or sore, they can cause a serious infection on your skin or in other parts of your body. There is a slight chance that the staph on your skin or in your nose is MRSA.  There are two types of MRSA infections:  · Hospital-acquired MRSA is bacteria that you get in the hospital.  · Community-acquired MRSA is bacteria that you get somewhere other than in a hospital.  What increases the risk?  Hospital-acquired MRSA is more common. You could be at risk for this infection if you are in the hospital and you:  · Have surgery or a procedure.  · Have an IV access or a catheter tube placed in your body.  · Have weak resistance to germs (weakened immune system).  · Are elderly.  · Are on kidney dialysis.  You could be at risk for community-acquired MRSA if you have a break in your skin and come into contact with MRSA. This may happen if you:  · Play sports where there is skin-to-skin contact.  · Live in a crowded setting, like a dormitory or a [employer] barr[employer].  · Share towels, razors, or sports equipment with other people.  What are the signs or symptoms?  Symptoms of hospital-acquired MRSA depend on where MRSA has spread. Symptoms may include:  · Wound infection.  · Skin infection.  · Rash.  · Pneumonia.  · Fever and chills.  · Difficulty breathing.  · Chest pain.  Community-acquired MRSA is most likely to start as a scratch or cut that becomes infected. Symptoms may include:  · A pus-filled pimple.  · A boil on your skin.  · Pus draining from your skin.  · A sore (abscess) under your skin or somewhere in your body.  · Fever with or without chills.  How is this diagnosed?  The diagnosis of MRSA is made by taking a sample from an infected area and sending it to a lab for testing. A  can grow (culture) MRSA and check it under a microscope. The cultured MRSA can be tested to see which type of  antibiotic medicine will work to treat it. Newer tests can identify MRSA more quickly by testing bacteria samples for MRSA genes. Your health care provider can diagnose MRSA using samples from:  · Cuts or wounds in infected areas.  · Nasal swabs.  · Saliva or cough specimens from deep in the lungs (sputum).  · Urine.  · Blood.  You may also have:  · Imaging studies (such as X-ray or MRI) to check if the infection has spread to the lungs, bones, or joints.  · A culture and sensitivity test of blood or fluids from inside the joints.  How is this treated?  Treatment depends on how severe, deep, or extensive the infection is. Very bad infections may require a hospital stay.  · Some skin infections, such as a small boil or sore (abscess), may be treated by draining pus from the site of the infection.  · More extensive surgery to drain pus may be necessary for deeper or more widespread soft tissue infections.  · You may then have to take antibiotic medicine given by mouth or through a vein. You may start antibiotic treatment right away or after testing can be done to see what antibiotic medicine should be used.  Follow these instructions at home:  · Take your antibiotics as directed by your health care provider. Take the medicine as prescribed until it is finished.  · Avoid close contact with those around you as much as possible. Do not use towels, razors, toothbrushes, bedding, or other items that will be used by others.  · Wash your hands frequently for 15 seconds with soap and water. Dry your hands with a clean or disposable towel.  · When you are not able to wash your hands, use hand  that is more than 60 percent alcohol.  · Wash towels, sheets, or clothes in the washing machine with detergent and hot water. Dry them in a hot dryer.  · Follow your health care provider's instructions for wound care. Wash your hands before and after changing your bandages.  · Always shower after exercising.  · Keep all cuts and  scrapes clean and covered with a bandage.  · Be sure to tell all your health care providers that you have MRSA so they are aware of your infection.  Contact a health care provider if:  · You have a cut, scrape, pimple, or boil that becomes red, swollen, or painful or has pus in it.  · You have pus draining from your skin.  · You have an abscess under your skin or somewhere in your body.  Get help right away if:  · You have symptoms of a skin infection with a fever or chills.  · You have trouble breathing.  · You have chest pain.  · You have a skin wound and you become nauseous or start vomiting.  This information is not intended to replace advice given to you by your health care provider. Make sure you discuss any questions you have with your health care provider.  Document Released: 12/18/2006 Document Revised: 05/25/2017 Document Reviewed: 10/10/2014  [employer] Interactive Patient Education © 2017 [employer]        Depression / Suicide Risk    As you are discharged from this [facility] facility, it is important to learn how to keep safe from harming yourself.    Recognize the warning signs:  · Abrupt changes in personality, positive or negative- including increase in energy   · Giving away possessions  · Change in eating patterns- significant weight changes-  positive or negative  · Change in sleeping patterns- unable to sleep or sleeping all the time   · Unwillingness or inability to communicate  · Depression  · Unusual sadness, discouragement and loneliness  · Talk of wanting to die  · Neglect of personal appearance   · Rebelliousness- reckless behavior  · Withdrawal from people/activities they love  · Confusion- inability to concentrate     If you or a loved one observes any of these behaviors or has concerns about self-harm, here's what you can do:  · Talk about it- your feelings and reasons for harming yourself  · Remove any means that you might use to hurt yourself (examples: pills, rope, extension  cords, firearm)  · Get professional help from the community (Mental Health, Substance Abuse, psychological counseling)  · Do not be alone:Call your Safe Contact- someone whom you trust who will be there for you.  · Call your local CRISIS HOTLINE 948-4543 or 373-962-0386  · Call your local Children's Mobile Crisis Response Team [place] (036) 594-1535 or www.Ovonyx  · Call the toll free National Suicide Prevention Hotlines   · National Suicide Prevention Lifeline 665-223-JCUR (7320)  · National Hope Line Network 800-SUICIDE (052-7357)

## 2018-10-26 NOTE — DISCHARGE PLANNING
Anticipated Discharge Disposition: Discharge to street.  Patient is homeless and doesn't like staying at the homeless shelter.  He needs a follow-up appointment at the [facility].    Action: This RN CM left a VM message at the [facility] and requested a call back in regard to getting the patient an appointment there, preferably on Monday, 10/29.    Barriers to Discharge: Needs to have an appointment at the wound clinic before he discharges.    Plan: Await call back from the [facility].      Update (11:10 AM): Received a call from the [facility].  The patient has an appointment for 11:30 AM (Check-in11:15 AM) on Monday, 10/29.  Will give the patient a map with appointment time.

## 2018-10-26 NOTE — DISCHARGE SUMMARY
Discharge Summary    CHIEF COMPLAINT ON ADMISSION  Chief Complaint   Patient presents with   • ETOH Withdrawal   • Wound Check       Reason for Admission  EMS     Admission Date  10/24/2018    CODE STATUS  Full Code    HPI & HOSPITAL COURSE  This is a 63 y.o. male who presented 10/24/2018 with unsteady gait. Mr. Varela has a past medical history of severe alcohol abuse had necrotizing fasciitis debridement by Dr. Alvares at [place] in May 2018.  Was treated with IV antibiotics via a PICC line there.  Is been followed up at the wound care clinic most recently admitted here from 8/22/2018 through 8/28/2018 because of pain in the leg and cellulitis.   IN ER he was found to have a blood alcohol of 0.39, and also found to have his leg wrapped in gauze that was quite malodorous.  Pt was admitted for further treatment, he was started on CIWA protocol, wound care was consulted and saw pt, they recommended follow up with wound clinic. Pt was also started on abx, wound culture grew MRSA sensitive to bactrim and also strep. Pt was started on bactrim and Augmentin. Today pt wants to leave, he doesn't want to stop drinking.  Sw has arranged for him outpt appointment with wound care clinic and information was given to pt, also SW has arranged for him abx to use as outpt since he doesn't want to stay, for which the hospital will pay for and also  provided 2 bus tickets (each worth 2 rides) to get to and from his first wound clinic appointment and to and from [facility] to  his prescriptions.   Pt will be discharged today.       Discharge Date  10/26/18    FOLLOW UP ITEMS POST DISCHARGE  Wound clinic     DISCHARGE DIAGNOSES  Principal Problem:    Alcohol intoxication with delirium (HCC) POA: Yes  Active Problems:    Open wound of left lower leg POA: Yes    Tobacco use POA: Yes  Resolved Problems:    * No resolved hospital problems. *      FOLLOW UP  Future Appointments  Date Time Provider Department Center   10/29/2018  11:30 AM Kiana Seth R.N. [facility] [address]     No follow-up provider specified.    MEDICATIONS ON DISCHARGE     Medication List      START taking these medications      Instructions   amoxicillin-clavulanate 875-125 MG Tabs  Commonly known as:  AUGMENTIN   Take 1 Tab by mouth every 12 hours.  Dose:  1 Tab     sulfamethoxazole-trimethoprim 800-160 MG tablet  Commonly known as:  BACTRIM DS   Take 1 Tab by mouth every 12 hours.  Dose:  1 Tab            Allergies  No Known Allergies    DIET  Orders Placed This Encounter   Procedures   • Diet Order Regular     Standing Status:   Standing     Number of Occurrences:   1     Order Specific Question:   Diet:     Answer:   Regular [1]       ACTIVITY  As tolerated.  Weight bearing as tolerated    CONSULTATIONS  None     PROCEDURES  None     LABORATORY  Lab Results   Component Value Date    SODIUM 137 10/26/2018    POTASSIUM 4.2 10/26/2018    CHLORIDE 105 10/26/2018    CO2 23 10/26/2018    GLUCOSE 109 (H) 10/26/2018    BUN 17 10/26/2018    CREATININE 0.91 10/26/2018        Lab Results   Component Value Date    WBC 6.4 10/26/2018    HEMOGLOBIN 8.5 (L) 10/26/2018    HEMATOCRIT 26.7 (L) 10/26/2018    PLATELETCT 384 10/26/2018        Total time of the discharge process exceeds 43 minutes.

## 2018-10-26 NOTE — DISCHARGE PLANNING
Anticipated Discharge Disposition: Return to streets, as patient is homeless.  He will need two medication prescriptions filled prior to discharge.      Action: This RN CM faxed a copy of the 2 prescriptions to the [facility] and requested a price for the medications so that we can do an Approved Service.    Barriers to Discharge: Patient will need the medications prior to discharge.    Plan: Await response from the [facility].    Update (2:40 PM): Found out that the [facility] is closed today.  [facility] [address] They called back and said that both his meds have no copay.  Typed out this information and address of [facility] and gave to bedside nurse who will give it to the patient upon discharge.  Also provided 2 bus tickets (each worth 2 rides) to get to and from his first wound clinic appointment and to and from [facility] to  his prescriptions.

## 2018-10-27 NOTE — PROGRESS NOTES
Pt discharged home. IV dc'd, tip intact. Discharge instructions discussed, he verbalized understanding and denied questions. Pt given 2 bus passes and antibiotic prescription prior to discharge. Pt given map to wound apt on Monday. All bedside belongings sent with pt. Pt ambulated off unit by self.

## 2018-10-29 LAB
BACTERIA BLD CULT: NORMAL
BACTERIA BLD CULT: NORMAL
SIGNIFICANT IND 70042: NORMAL
SIGNIFICANT IND 70042: NORMAL
SITE SITE: NORMAL
SITE SITE: NORMAL
SOURCE SOURCE: NORMAL
SOURCE SOURCE: NORMAL

## 2018-10-31 ENCOUNTER — HOSPITAL ENCOUNTER (EMERGENCY)
Dept: HOSPITAL 8 - ED | Age: 63
Discharge: HOME | End: 2018-10-31
Payer: MEDICAID

## 2018-10-31 VITALS — DIASTOLIC BLOOD PRESSURE: 80 MMHG | SYSTOLIC BLOOD PRESSURE: 128 MMHG

## 2018-10-31 VITALS — BODY MASS INDEX: 21.6 KG/M2 | HEIGHT: 71 IN | WEIGHT: 154.32 LBS

## 2018-10-31 DIAGNOSIS — Z72.9: ICD-10-CM

## 2018-10-31 DIAGNOSIS — I10: ICD-10-CM

## 2018-10-31 DIAGNOSIS — Z87.440: ICD-10-CM

## 2018-10-31 DIAGNOSIS — L03.116: Primary | ICD-10-CM

## 2018-10-31 LAB
ALBUMIN SERPL-MCNC: 3.3 G/DL (ref 3.4–5)
ALP SERPL-CCNC: 128 U/L (ref 45–117)
ALT SERPL-CCNC: 50 U/L (ref 12–78)
ANION GAP SERPL CALC-SCNC: 11 MMOL/L (ref 5–15)
BASOPHILS # BLD AUTO: 0.06 X10^3/UL (ref 0–0.1)
BASOPHILS NFR BLD AUTO: 1 % (ref 0–1)
BILIRUB SERPL-MCNC: 0.2 MG/DL (ref 0.2–1)
CALCIUM SERPL-MCNC: 8.6 MG/DL (ref 8.5–10.1)
CHLORIDE SERPL-SCNC: 106 MMOL/L (ref 98–107)
CREAT SERPL-MCNC: 0.88 MG/DL (ref 0.7–1.3)
EOSINOPHIL # BLD AUTO: 0.06 X10^3/UL (ref 0–0.4)
EOSINOPHIL NFR BLD AUTO: 1 % (ref 1–7)
ERYTHROCYTE [DISTWIDTH] IN BLOOD BY AUTOMATED COUNT: 20.1 % (ref 9.4–14.8)
LYMPHOCYTES # BLD AUTO: 1.37 X10^3/UL (ref 1–3.4)
LYMPHOCYTES NFR BLD AUTO: 24 % (ref 22–44)
MCH RBC QN AUTO: 26.2 PG (ref 27.5–34.5)
MCHC RBC AUTO-ENTMCNC: 32.6 G/DL (ref 33.2–36.2)
MCV RBC AUTO: 80.2 FL (ref 81–97)
MD: NO
MONOCYTES # BLD AUTO: 0.59 X10^3/UL (ref 0.2–0.8)
MONOCYTES NFR BLD AUTO: 10 % (ref 2–9)
NEUTROPHILS # BLD AUTO: 3.7 X10^3/UL (ref 1.8–6.8)
NEUTROPHILS NFR BLD AUTO: 64 % (ref 42–75)
PLATELET # BLD AUTO: 497 X10^3/UL (ref 130–400)
PMV BLD AUTO: 6.9 FL (ref 7.4–10.4)
PROT SERPL-MCNC: 7.6 G/DL (ref 6.4–8.2)
RBC # BLD AUTO: 4.22 X10^6/UL (ref 4.38–5.82)

## 2018-10-31 PROCEDURE — 36415 COLL VENOUS BLD VENIPUNCTURE: CPT

## 2018-10-31 PROCEDURE — 85025 COMPLETE CBC W/AUTO DIFF WBC: CPT

## 2018-10-31 PROCEDURE — 80053 COMPREHEN METABOLIC PANEL: CPT

## 2018-10-31 PROCEDURE — 99285 EMERGENCY DEPT VISIT HI MDM: CPT

## 2018-11-03 ENCOUNTER — HOSPITAL ENCOUNTER (EMERGENCY)
Dept: HOSPITAL 8 - ED | Age: 63
Discharge: HOME | End: 2018-11-03
Payer: MEDICAID

## 2018-11-03 VITALS — WEIGHT: 154.32 LBS | BODY MASS INDEX: 22.09 KG/M2 | HEIGHT: 70 IN

## 2018-11-03 VITALS — DIASTOLIC BLOOD PRESSURE: 71 MMHG | SYSTOLIC BLOOD PRESSURE: 122 MMHG

## 2018-11-03 DIAGNOSIS — Z48.01: Primary | ICD-10-CM

## 2018-11-03 DIAGNOSIS — I10: ICD-10-CM

## 2018-11-03 DIAGNOSIS — R07.9: ICD-10-CM

## 2018-11-03 LAB
ALBUMIN SERPL-MCNC: 2.8 G/DL (ref 3.4–5)
ALP SERPL-CCNC: 147 U/L (ref 45–117)
ALT SERPL-CCNC: 42 U/L (ref 12–78)
ANION GAP SERPL CALC-SCNC: 11 MMOL/L (ref 5–15)
BASOPHILS # BLD AUTO: 0 X10^3/UL (ref 0–0.1)
BASOPHILS NFR BLD AUTO: 0 % (ref 0–1)
BILIRUB SERPL-MCNC: 0.3 MG/DL (ref 0.2–1)
CALCIUM SERPL-MCNC: 8 MG/DL (ref 8.5–10.1)
CHLORIDE SERPL-SCNC: 103 MMOL/L (ref 98–107)
CREAT SERPL-MCNC: 0.76 MG/DL (ref 0.7–1.3)
EOSINOPHIL # BLD AUTO: 0.04 X10^3/UL (ref 0–0.4)
EOSINOPHIL NFR BLD AUTO: 1 % (ref 1–7)
ERYTHROCYTE [DISTWIDTH] IN BLOOD BY AUTOMATED COUNT: 20.8 % (ref 9.4–14.8)
LYMPHOCYTES # BLD AUTO: 1.06 X10^3/UL (ref 1–3.4)
LYMPHOCYTES NFR BLD AUTO: 18 % (ref 22–44)
MCH RBC QN AUTO: 26.6 PG (ref 27.5–34.5)
MCHC RBC AUTO-ENTMCNC: 32.9 G/DL (ref 33.2–36.2)
MCV RBC AUTO: 80.8 FL (ref 81–97)
MD: NO
MONOCYTES # BLD AUTO: 0.65 X10^3/UL (ref 0.2–0.8)
MONOCYTES NFR BLD AUTO: 11 % (ref 2–9)
NEUTROPHILS # BLD AUTO: 4.25 X10^3/UL (ref 1.8–6.8)
NEUTROPHILS NFR BLD AUTO: 71 % (ref 42–75)
PLATELET # BLD AUTO: 392 X10^3/UL (ref 130–400)
PMV BLD AUTO: 7.4 FL (ref 7.4–10.4)
PROT SERPL-MCNC: 7.3 G/DL (ref 6.4–8.2)
RBC # BLD AUTO: 3.95 X10^6/UL (ref 4.38–5.82)

## 2018-11-03 PROCEDURE — 85025 COMPLETE CBC W/AUTO DIFF WBC: CPT

## 2018-11-03 PROCEDURE — 71046 X-RAY EXAM CHEST 2 VIEWS: CPT

## 2018-11-03 PROCEDURE — 36415 COLL VENOUS BLD VENIPUNCTURE: CPT

## 2018-11-03 PROCEDURE — 99285 EMERGENCY DEPT VISIT HI MDM: CPT

## 2018-11-03 PROCEDURE — 80053 COMPREHEN METABOLIC PANEL: CPT

## 2018-11-09 ENCOUNTER — HOSPITAL ENCOUNTER (INPATIENT)
Facility: MEDICAL CENTER | Age: 63
LOS: 8 days | DRG: 862 | End: 2018-11-17
Attending: EMERGENCY MEDICINE | Admitting: HOSPITALIST
Payer: MEDICAID

## 2018-11-09 DIAGNOSIS — T14.8XXA WOUND INFECTION: ICD-10-CM

## 2018-11-09 DIAGNOSIS — E43 SEVERE PROTEIN-CALORIE MALNUTRITION (HCC): ICD-10-CM

## 2018-11-09 DIAGNOSIS — S81.802D OPEN WOUND OF LEFT LOWER LEG, SUBSEQUENT ENCOUNTER: ICD-10-CM

## 2018-11-09 DIAGNOSIS — L08.9 WOUND INFECTION: ICD-10-CM

## 2018-11-09 PROBLEM — A41.9 SEPSIS (HCC): Status: ACTIVE | Noted: 2018-11-09

## 2018-11-09 PROBLEM — F10.10 ALCOHOL ABUSE: Status: ACTIVE | Noted: 2018-11-09

## 2018-11-09 PROBLEM — R53.81 DEBILITY: Status: ACTIVE | Noted: 2018-11-09

## 2018-11-09 PROBLEM — E87.20 LACTIC ACIDOSIS: Status: ACTIVE | Noted: 2018-11-09

## 2018-11-09 LAB
ALBUMIN SERPL BCP-MCNC: 3.4 G/DL (ref 3.2–4.9)
ALBUMIN/GLOB SERPL: 0.9 G/DL
ALP SERPL-CCNC: 124 U/L (ref 30–99)
ALT SERPL-CCNC: 20 U/L (ref 2–50)
ANION GAP SERPL CALC-SCNC: 14 MMOL/L (ref 0–11.9)
APTT PPP: 36.6 SEC (ref 24.7–36)
AST SERPL-CCNC: 33 U/L (ref 12–45)
BASOPHILS # BLD AUTO: 1 % (ref 0–1.8)
BASOPHILS # BLD: 0.06 K/UL (ref 0–0.12)
BILIRUB SERPL-MCNC: 0.4 MG/DL (ref 0.1–1.5)
BUN SERPL-MCNC: 10 MG/DL (ref 8–22)
CALCIUM SERPL-MCNC: 8.9 MG/DL (ref 8.5–10.5)
CHLORIDE SERPL-SCNC: 103 MMOL/L (ref 96–112)
CO2 SERPL-SCNC: 23 MMOL/L (ref 20–33)
CREAT SERPL-MCNC: 0.7 MG/DL (ref 0.5–1.4)
EOSINOPHIL # BLD AUTO: 0.01 K/UL (ref 0–0.51)
EOSINOPHIL NFR BLD: 0.2 % (ref 0–6.9)
ERYTHROCYTE [DISTWIDTH] IN BLOOD BY AUTOMATED COUNT: 53.1 FL (ref 35.9–50)
GLOBULIN SER CALC-MCNC: 3.7 G/DL (ref 1.9–3.5)
GLUCOSE SERPL-MCNC: 91 MG/DL (ref 65–99)
HCT VFR BLD AUTO: 30.2 % (ref 42–52)
HGB BLD-MCNC: 9.7 G/DL (ref 14–18)
IMM GRANULOCYTES # BLD AUTO: 0.02 K/UL (ref 0–0.11)
IMM GRANULOCYTES NFR BLD AUTO: 0.3 % (ref 0–0.9)
INR PPP: 1.09 (ref 0.87–1.13)
LACTATE BLD-SCNC: 2.2 MMOL/L (ref 0.5–2)
LYMPHOCYTES # BLD AUTO: 1.4 K/UL (ref 1–4.8)
LYMPHOCYTES NFR BLD: 24 % (ref 22–41)
MCH RBC QN AUTO: 25.8 PG (ref 27–33)
MCHC RBC AUTO-ENTMCNC: 32.1 G/DL (ref 33.7–35.3)
MCV RBC AUTO: 80.3 FL (ref 81.4–97.8)
MONOCYTES # BLD AUTO: 1.5 K/UL (ref 0–0.85)
MONOCYTES NFR BLD AUTO: 25.7 % (ref 0–13.4)
NEUTROPHILS # BLD AUTO: 2.85 K/UL (ref 1.82–7.42)
NEUTROPHILS NFR BLD: 48.8 % (ref 44–72)
NRBC # BLD AUTO: 0 K/UL
NRBC BLD-RTO: 0 /100 WBC
PLATELET # BLD AUTO: 380 K/UL (ref 164–446)
PMV BLD AUTO: 8.5 FL (ref 9–12.9)
POTASSIUM SERPL-SCNC: 4.1 MMOL/L (ref 3.6–5.5)
PROT SERPL-MCNC: 7.1 G/DL (ref 6–8.2)
PROTHROMBIN TIME: 14.2 SEC (ref 12–14.6)
RBC # BLD AUTO: 3.76 M/UL (ref 4.7–6.1)
SODIUM SERPL-SCNC: 140 MMOL/L (ref 135–145)
TSH SERPL DL<=0.005 MIU/L-ACNC: 0.67 UIU/ML (ref 0.38–5.33)
WBC # BLD AUTO: 5.8 K/UL (ref 4.8–10.8)

## 2018-11-09 PROCEDURE — HZ2ZZZZ DETOXIFICATION SERVICES FOR SUBSTANCE ABUSE TREATMENT: ICD-10-PCS | Performed by: HOSPITALIST

## 2018-11-09 PROCEDURE — 85730 THROMBOPLASTIN TIME PARTIAL: CPT

## 2018-11-09 PROCEDURE — 99223 1ST HOSP IP/OBS HIGH 75: CPT | Mod: 25 | Performed by: HOSPITALIST

## 2018-11-09 PROCEDURE — A9270 NON-COVERED ITEM OR SERVICE: HCPCS | Performed by: EMERGENCY MEDICINE

## 2018-11-09 PROCEDURE — 700102 HCHG RX REV CODE 250 W/ 637 OVERRIDE(OP): Performed by: EMERGENCY MEDICINE

## 2018-11-09 PROCEDURE — 770021 HCHG ROOM/CARE - ISO PRIVATE

## 2018-11-09 PROCEDURE — 700102 HCHG RX REV CODE 250 W/ 637 OVERRIDE(OP): Performed by: HOSPITALIST

## 2018-11-09 PROCEDURE — 84443 ASSAY THYROID STIM HORMONE: CPT

## 2018-11-09 PROCEDURE — 700111 HCHG RX REV CODE 636 W/ 250 OVERRIDE (IP): Performed by: HOSPITALIST

## 2018-11-09 PROCEDURE — 85610 PROTHROMBIN TIME: CPT

## 2018-11-09 PROCEDURE — 87205 SMEAR GRAM STAIN: CPT

## 2018-11-09 PROCEDURE — 96365 THER/PROPH/DIAG IV INF INIT: CPT

## 2018-11-09 PROCEDURE — 99407 BEHAV CHNG SMOKING > 10 MIN: CPT | Performed by: HOSPITALIST

## 2018-11-09 PROCEDURE — 36415 COLL VENOUS BLD VENIPUNCTURE: CPT

## 2018-11-09 PROCEDURE — 80053 COMPREHEN METABOLIC PANEL: CPT

## 2018-11-09 PROCEDURE — 83036 HEMOGLOBIN GLYCOSYLATED A1C: CPT

## 2018-11-09 PROCEDURE — 99285 EMERGENCY DEPT VISIT HI MDM: CPT

## 2018-11-09 PROCEDURE — 83605 ASSAY OF LACTIC ACID: CPT

## 2018-11-09 PROCEDURE — A9270 NON-COVERED ITEM OR SERVICE: HCPCS | Performed by: HOSPITALIST

## 2018-11-09 PROCEDURE — 87186 SC STD MICRODIL/AGAR DIL: CPT

## 2018-11-09 PROCEDURE — 87070 CULTURE OTHR SPECIMN AEROBIC: CPT

## 2018-11-09 PROCEDURE — 87040 BLOOD CULTURE FOR BACTERIA: CPT

## 2018-11-09 PROCEDURE — 85025 COMPLETE CBC W/AUTO DIFF WBC: CPT

## 2018-11-09 PROCEDURE — 700105 HCHG RX REV CODE 258: Performed by: HOSPITALIST

## 2018-11-09 RX ORDER — SODIUM CHLORIDE 9 MG/ML
30 INJECTION, SOLUTION INTRAVENOUS
Status: DISCONTINUED | OUTPATIENT
Start: 2018-11-09 | End: 2018-11-16

## 2018-11-09 RX ORDER — FOLIC ACID 1 MG/1
1 TABLET ORAL DAILY
Status: COMPLETED | OUTPATIENT
Start: 2018-11-10 | End: 2018-11-13

## 2018-11-09 RX ORDER — ONDANSETRON 2 MG/ML
4 INJECTION INTRAMUSCULAR; INTRAVENOUS EVERY 4 HOURS PRN
Status: DISCONTINUED | OUTPATIENT
Start: 2018-11-09 | End: 2018-11-17 | Stop reason: HOSPADM

## 2018-11-09 RX ORDER — LORAZEPAM 2 MG/ML
0.5 INJECTION INTRAMUSCULAR EVERY 4 HOURS PRN
Status: DISCONTINUED | OUTPATIENT
Start: 2018-11-09 | End: 2018-11-14

## 2018-11-09 RX ORDER — LORAZEPAM 1 MG/1
4 TABLET ORAL
Status: DISCONTINUED | OUTPATIENT
Start: 2018-11-09 | End: 2018-11-14

## 2018-11-09 RX ORDER — PROMETHAZINE HYDROCHLORIDE 25 MG/1
12.5-25 TABLET ORAL EVERY 4 HOURS PRN
Status: DISCONTINUED | OUTPATIENT
Start: 2018-11-09 | End: 2018-11-17 | Stop reason: HOSPADM

## 2018-11-09 RX ORDER — THIAMINE MONONITRATE (VIT B1) 100 MG
100 TABLET ORAL DAILY
Status: DISCONTINUED | OUTPATIENT
Start: 2018-11-10 | End: 2018-11-17 | Stop reason: HOSPADM

## 2018-11-09 RX ORDER — SODIUM CHLORIDE 9 MG/ML
500 INJECTION, SOLUTION INTRAVENOUS
Status: DISCONTINUED | OUTPATIENT
Start: 2018-11-09 | End: 2018-11-16

## 2018-11-09 RX ORDER — LORAZEPAM 2 MG/ML
1 INJECTION INTRAMUSCULAR
Status: DISCONTINUED | OUTPATIENT
Start: 2018-11-09 | End: 2018-11-14

## 2018-11-09 RX ORDER — HEPARIN SODIUM 5000 [USP'U]/ML
5000 INJECTION, SOLUTION INTRAVENOUS; SUBCUTANEOUS EVERY 8 HOURS
Status: DISCONTINUED | OUTPATIENT
Start: 2018-11-09 | End: 2018-11-16

## 2018-11-09 RX ORDER — LORAZEPAM 1 MG/1
2 TABLET ORAL
Status: DISCONTINUED | OUTPATIENT
Start: 2018-11-09 | End: 2018-11-14

## 2018-11-09 RX ORDER — ONDANSETRON 4 MG/1
4 TABLET, ORALLY DISINTEGRATING ORAL EVERY 4 HOURS PRN
Status: DISCONTINUED | OUTPATIENT
Start: 2018-11-09 | End: 2018-11-17 | Stop reason: HOSPADM

## 2018-11-09 RX ORDER — POLYETHYLENE GLYCOL 3350 17 G/17G
1 POWDER, FOR SOLUTION ORAL
Status: DISCONTINUED | OUTPATIENT
Start: 2018-11-09 | End: 2018-11-17 | Stop reason: HOSPADM

## 2018-11-09 RX ORDER — BISACODYL 10 MG
10 SUPPOSITORY, RECTAL RECTAL
Status: DISCONTINUED | OUTPATIENT
Start: 2018-11-09 | End: 2018-11-17 | Stop reason: HOSPADM

## 2018-11-09 RX ORDER — LORAZEPAM 1 MG/1
3 TABLET ORAL
Status: DISCONTINUED | OUTPATIENT
Start: 2018-11-09 | End: 2018-11-14

## 2018-11-09 RX ORDER — LORAZEPAM 1 MG/1
0.5 TABLET ORAL EVERY 4 HOURS PRN
Status: DISCONTINUED | OUTPATIENT
Start: 2018-11-09 | End: 2018-11-14

## 2018-11-09 RX ORDER — LORAZEPAM 1 MG/1
1 TABLET ORAL EVERY 4 HOURS PRN
Status: DISCONTINUED | OUTPATIENT
Start: 2018-11-09 | End: 2018-11-14

## 2018-11-09 RX ORDER — PROMETHAZINE HYDROCHLORIDE 12.5 MG/1
12.5-25 SUPPOSITORY RECTAL EVERY 4 HOURS PRN
Status: DISCONTINUED | OUTPATIENT
Start: 2018-11-09 | End: 2018-11-17 | Stop reason: HOSPADM

## 2018-11-09 RX ORDER — SODIUM CHLORIDE 9 MG/ML
INJECTION, SOLUTION INTRAVENOUS CONTINUOUS
Status: DISCONTINUED | OUTPATIENT
Start: 2018-11-09 | End: 2018-11-11

## 2018-11-09 RX ORDER — ACETAMINOPHEN 325 MG/1
650 TABLET ORAL ONCE
Status: COMPLETED | OUTPATIENT
Start: 2018-11-09 | End: 2018-11-09

## 2018-11-09 RX ORDER — LORAZEPAM 2 MG/ML
1.5 INJECTION INTRAMUSCULAR
Status: DISCONTINUED | OUTPATIENT
Start: 2018-11-09 | End: 2018-11-14

## 2018-11-09 RX ORDER — LORAZEPAM 2 MG/ML
2 INJECTION INTRAMUSCULAR
Status: DISCONTINUED | OUTPATIENT
Start: 2018-11-09 | End: 2018-11-14

## 2018-11-09 RX ORDER — AMOXICILLIN 250 MG
2 CAPSULE ORAL 2 TIMES DAILY
Status: DISCONTINUED | OUTPATIENT
Start: 2018-11-09 | End: 2018-11-17 | Stop reason: HOSPADM

## 2018-11-09 RX ADMIN — LORAZEPAM 1.5 MG: 2 INJECTION INTRAMUSCULAR; INTRAVENOUS at 22:11

## 2018-11-09 RX ADMIN — ACETAMINOPHEN 650 MG: 325 TABLET, FILM COATED ORAL at 18:44

## 2018-11-09 RX ADMIN — AMPICILLIN SODIUM AND SULBACTAM SODIUM 3 G: 2; 1 INJECTION, POWDER, FOR SOLUTION INTRAMUSCULAR; INTRAVENOUS at 20:18

## 2018-11-09 RX ADMIN — VANCOMYCIN HYDROCHLORIDE 1700 MG: 100 INJECTION, POWDER, LYOPHILIZED, FOR SOLUTION INTRAVENOUS at 22:38

## 2018-11-09 RX ADMIN — HEPARIN SODIUM 5000 UNITS: 5000 INJECTION, SOLUTION INTRAVENOUS; SUBCUTANEOUS at 22:37

## 2018-11-09 RX ADMIN — STANDARDIZED SENNA CONCENTRATE AND DOCUSATE SODIUM 2 TABLET: 8.6; 5 TABLET, FILM COATED ORAL at 22:37

## 2018-11-09 RX ADMIN — LORAZEPAM 1 MG: 2 INJECTION INTRAMUSCULAR; INTRAVENOUS at 22:50

## 2018-11-09 RX ADMIN — SODIUM CHLORIDE: 9 INJECTION, SOLUTION INTRAVENOUS at 22:37

## 2018-11-09 ASSESSMENT — COPD QUESTIONNAIRES
DO YOU EVER COUGH UP ANY MUCUS OR PHLEGM?: NO/ONLY WITH OCCASIONAL COLDS OR INFECTIONS
IN THE PAST 12 MONTHS DO YOU DO LESS THAN YOU USED TO BECAUSE OF YOUR BREATHING PROBLEMS: DISAGREE/UNSURE
HAVE YOU SMOKED AT LEAST 100 CIGARETTES IN YOUR ENTIRE LIFE: NO/DON'T KNOW
DURING THE PAST 4 WEEKS HOW MUCH DID YOU FEEL SHORT OF BREATH: NONE/LITTLE OF THE TIME
COPD SCREENING SCORE: 2

## 2018-11-09 ASSESSMENT — PAIN SCALES - GENERAL
PAINLEVEL_OUTOF10: 0
PAINLEVEL_OUTOF10: 10

## 2018-11-09 ASSESSMENT — COGNITIVE AND FUNCTIONAL STATUS - GENERAL
DAILY ACTIVITIY SCORE: 24
MOBILITY SCORE: 24
SUGGESTED CMS G CODE MODIFIER MOBILITY: CH
SUGGESTED CMS G CODE MODIFIER DAILY ACTIVITY: CH

## 2018-11-09 ASSESSMENT — ENCOUNTER SYMPTOMS
PALPITATIONS: 0
FLANK PAIN: 0
DEPRESSION: 0
ABDOMINAL PAIN: 0
COUGH: 0
SPEECH CHANGE: 0
DIZZINESS: 0
FOCAL WEAKNESS: 0
MYALGIAS: 0
WEAKNESS: 1
SENSORY CHANGE: 0
HEMOPTYSIS: 0
NAUSEA: 0
EYE DISCHARGE: 0
HALLUCINATIONS: 0
BRUISES/BLEEDS EASILY: 0
SHORTNESS OF BREATH: 0
BLURRED VISION: 0
FEVER: 1
DOUBLE VISION: 0
CHILLS: 1
HEARTBURN: 0
VOMITING: 0

## 2018-11-09 ASSESSMENT — LIFESTYLE VARIABLES
SUBSTANCE_ABUSE: 0
TOTAL SCORE: 1
AVERAGE NUMBER OF DAYS PER WEEK YOU HAVE A DRINK CONTAINING ALCOHOL: 6
EVER HAD A DRINK FIRST THING IN THE MORNING TO STEADY YOUR NERVES TO GET RID OF A HANGOVER: YES
AGITATION: *
ORIENTATION AND CLOUDING OF SENSORIUM: ORIENTED AND CAN DO SERIAL ADDITIONS
EVER FELT BAD OR GUILTY ABOUT YOUR DRINKING: NO
TREMOR: MODERATE TREMOR WITH ARMS EXTENDED
AUDITORY DISTURBANCES: NOT PRESENT
HOW MANY TIMES IN THE PAST YEAR HAVE YOU HAD 5 OR MORE DRINKS IN A DAY: 20
NAUSEA AND VOMITING: NO NAUSEA AND NO VOMITING
TREMOR: SEVERE TREMOR, EVEN WITH ARMS NOT EXTENDED
HEADACHE, FULLNESS IN HEAD: MODERATE
CONSUMPTION TOTAL: POSITIVE
NAUSEA AND VOMITING: MILD NAUSEA WITH NO VOMITING
VISUAL DISTURBANCES: NOT PRESENT
EVER_SMOKED: YES
TOTAL SCORE: 23
HAVE PEOPLE ANNOYED YOU BY CRITICIZING YOUR DRINKING: NO
ANXIETY: *
VISUAL DISTURBANCES: NOT PRESENT
HEADACHE, FULLNESS IN HEAD: VERY MILD
ON A TYPICAL DAY WHEN YOU DRINK ALCOHOL HOW MANY DRINKS DO YOU HAVE: 4
TOTAL SCORE: 11
TOTAL SCORE: 1
AGITATION: *
ORIENTATION AND CLOUDING OF SENSORIUM: ORIENTED AND CAN DO SERIAL ADDITIONS
TOTAL SCORE: 1
HAVE YOU EVER FELT YOU SHOULD CUT DOWN ON YOUR DRINKING: NO
ALCOHOL_USE: YES
PAROXYSMAL SWEATS: *
PAROXYSMAL SWEATS: *
AUDITORY DISTURBANCES: NOT PRESENT
ANXIETY: *

## 2018-11-09 NOTE — ED TRIAGE NOTES
"Pt ambulated to triage with   Chief Complaint   Patient presents with   • Wound Check     pt reports walking on trial and ground gave out and cut open left leg, insident happened in May.  from mid thigh to ankle, dried drainage to dressing, swelling noted. pt reports pain to site.     Pt was started on abx but hasn't been taking medications - \"they fell out of my pocket\".  Pt brought in from [facility].  Pt Informed regarding triage process and verbalized understanding to inform triage tech or RN for any changes in condition. Placed in lobby.       "

## 2018-11-10 LAB
ALBUMIN SERPL BCP-MCNC: 2.9 G/DL (ref 3.2–4.9)
ALBUMIN/GLOB SERPL: 0.9 G/DL
ALP SERPL-CCNC: 105 U/L (ref 30–99)
ALT SERPL-CCNC: 16 U/L (ref 2–50)
ANION GAP SERPL CALC-SCNC: 7 MMOL/L (ref 0–11.9)
AST SERPL-CCNC: 27 U/L (ref 12–45)
BASOPHILS # BLD AUTO: 0.9 % (ref 0–1.8)
BASOPHILS # BLD: 0.06 K/UL (ref 0–0.12)
BILIRUB SERPL-MCNC: 0.4 MG/DL (ref 0.1–1.5)
BUN SERPL-MCNC: 15 MG/DL (ref 8–22)
CALCIUM SERPL-MCNC: 8.3 MG/DL (ref 8.5–10.5)
CHLORIDE SERPL-SCNC: 103 MMOL/L (ref 96–112)
CHOLEST SERPL-MCNC: 117 MG/DL (ref 100–199)
CO2 SERPL-SCNC: 26 MMOL/L (ref 20–33)
CREAT SERPL-MCNC: 0.8 MG/DL (ref 0.5–1.4)
EOSINOPHIL # BLD AUTO: 0.01 K/UL (ref 0–0.51)
EOSINOPHIL NFR BLD: 0.2 % (ref 0–6.9)
ERYTHROCYTE [DISTWIDTH] IN BLOOD BY AUTOMATED COUNT: 53.3 FL (ref 35.9–50)
EST. AVERAGE GLUCOSE BLD GHB EST-MCNC: 117 MG/DL
GLOBULIN SER CALC-MCNC: 3.1 G/DL (ref 1.9–3.5)
GLUCOSE SERPL-MCNC: 122 MG/DL (ref 65–99)
GRAM STN SPEC: NORMAL
HBA1C MFR BLD: 5.7 % (ref 0–5.6)
HCT VFR BLD AUTO: 27.2 % (ref 42–52)
HDLC SERPL-MCNC: 71 MG/DL
HGB BLD-MCNC: 8.6 G/DL (ref 14–18)
IMM GRANULOCYTES # BLD AUTO: 0.03 K/UL (ref 0–0.11)
IMM GRANULOCYTES NFR BLD AUTO: 0.5 % (ref 0–0.9)
LACTATE BLD-SCNC: 0.8 MMOL/L (ref 0.5–2)
LACTATE BLD-SCNC: 1 MMOL/L (ref 0.5–2)
LDLC SERPL CALC-MCNC: 36 MG/DL
LYMPHOCYTES # BLD AUTO: 1.14 K/UL (ref 1–4.8)
LYMPHOCYTES NFR BLD: 17.7 % (ref 22–41)
MCH RBC QN AUTO: 25.4 PG (ref 27–33)
MCHC RBC AUTO-ENTMCNC: 31.6 G/DL (ref 33.7–35.3)
MCV RBC AUTO: 80.5 FL (ref 81.4–97.8)
MONOCYTES # BLD AUTO: 1.61 K/UL (ref 0–0.85)
MONOCYTES NFR BLD AUTO: 25 % (ref 0–13.4)
NEUTROPHILS # BLD AUTO: 3.6 K/UL (ref 1.82–7.42)
NEUTROPHILS NFR BLD: 55.7 % (ref 44–72)
NRBC # BLD AUTO: 0 K/UL
NRBC BLD-RTO: 0 /100 WBC
PLATELET # BLD AUTO: 333 K/UL (ref 164–446)
PMV BLD AUTO: 8.4 FL (ref 9–12.9)
POTASSIUM SERPL-SCNC: 3.9 MMOL/L (ref 3.6–5.5)
PROT SERPL-MCNC: 6 G/DL (ref 6–8.2)
RBC # BLD AUTO: 3.38 M/UL (ref 4.7–6.1)
SIGNIFICANT IND 70042: NORMAL
SITE SITE: NORMAL
SODIUM SERPL-SCNC: 136 MMOL/L (ref 135–145)
SOURCE SOURCE: NORMAL
TRIGL SERPL-MCNC: 49 MG/DL (ref 0–149)
WBC # BLD AUTO: 6.5 K/UL (ref 4.8–10.8)

## 2018-11-10 PROCEDURE — 770021 HCHG ROOM/CARE - ISO PRIVATE

## 2018-11-10 PROCEDURE — 83605 ASSAY OF LACTIC ACID: CPT

## 2018-11-10 PROCEDURE — 90686 IIV4 VACC NO PRSV 0.5 ML IM: CPT | Performed by: HOSPITALIST

## 2018-11-10 PROCEDURE — 700105 HCHG RX REV CODE 258: Performed by: HOSPITALIST

## 2018-11-10 PROCEDURE — 99233 SBSQ HOSP IP/OBS HIGH 50: CPT | Performed by: INTERNAL MEDICINE

## 2018-11-10 PROCEDURE — 36415 COLL VENOUS BLD VENIPUNCTURE: CPT

## 2018-11-10 PROCEDURE — 3E02340 INTRODUCTION OF INFLUENZA VACCINE INTO MUSCLE, PERCUTANEOUS APPROACH: ICD-10-PCS | Performed by: HOSPITALIST

## 2018-11-10 PROCEDURE — 90471 IMMUNIZATION ADMIN: CPT

## 2018-11-10 PROCEDURE — 80053 COMPREHEN METABOLIC PANEL: CPT

## 2018-11-10 PROCEDURE — A9270 NON-COVERED ITEM OR SERVICE: HCPCS | Performed by: HOSPITALIST

## 2018-11-10 PROCEDURE — 80061 LIPID PANEL: CPT

## 2018-11-10 PROCEDURE — 700102 HCHG RX REV CODE 250 W/ 637 OVERRIDE(OP): Performed by: HOSPITALIST

## 2018-11-10 PROCEDURE — 85025 COMPLETE CBC W/AUTO DIFF WBC: CPT

## 2018-11-10 PROCEDURE — 700111 HCHG RX REV CODE 636 W/ 250 OVERRIDE (IP): Performed by: HOSPITALIST

## 2018-11-10 RX ADMIN — STANDARDIZED SENNA CONCENTRATE AND DOCUSATE SODIUM 2 TABLET: 8.6; 5 TABLET, FILM COATED ORAL at 05:32

## 2018-11-10 RX ADMIN — THERA TABS 1 TABLET: TAB at 05:32

## 2018-11-10 RX ADMIN — LORAZEPAM 2 MG: 1 TABLET ORAL at 23:36

## 2018-11-10 RX ADMIN — VANCOMYCIN HYDROCHLORIDE 1000 MG: 100 INJECTION, POWDER, LYOPHILIZED, FOR SOLUTION INTRAVENOUS at 22:50

## 2018-11-10 RX ADMIN — LORAZEPAM 1 MG: 1 TABLET ORAL at 01:33

## 2018-11-10 RX ADMIN — VANCOMYCIN HYDROCHLORIDE 1000 MG: 100 INJECTION, POWDER, LYOPHILIZED, FOR SOLUTION INTRAVENOUS at 11:06

## 2018-11-10 RX ADMIN — HEPARIN SODIUM 5000 UNITS: 5000 INJECTION, SOLUTION INTRAVENOUS; SUBCUTANEOUS at 05:32

## 2018-11-10 RX ADMIN — SODIUM CHLORIDE: 9 INJECTION, SOLUTION INTRAVENOUS at 22:50

## 2018-11-10 RX ADMIN — AMPICILLIN SODIUM AND SULBACTAM SODIUM 3 G: 2; 1 INJECTION, POWDER, FOR SOLUTION INTRAMUSCULAR; INTRAVENOUS at 09:08

## 2018-11-10 RX ADMIN — HEPARIN SODIUM 5000 UNITS: 5000 INJECTION, SOLUTION INTRAVENOUS; SUBCUTANEOUS at 22:50

## 2018-11-10 RX ADMIN — AMPICILLIN SODIUM AND SULBACTAM SODIUM 3 G: 2; 1 INJECTION, POWDER, FOR SOLUTION INTRAMUSCULAR; INTRAVENOUS at 14:53

## 2018-11-10 RX ADMIN — STANDARDIZED SENNA CONCENTRATE AND DOCUSATE SODIUM 2 TABLET: 8.6; 5 TABLET, FILM COATED ORAL at 18:09

## 2018-11-10 RX ADMIN — SODIUM CHLORIDE: 9 INJECTION, SOLUTION INTRAVENOUS at 11:06

## 2018-11-10 RX ADMIN — LORAZEPAM 2 MG: 1 TABLET ORAL at 05:32

## 2018-11-10 RX ADMIN — AMPICILLIN SODIUM AND SULBACTAM SODIUM 3 G: 2; 1 INJECTION, POWDER, FOR SOLUTION INTRAMUSCULAR; INTRAVENOUS at 20:43

## 2018-11-10 RX ADMIN — HEPARIN SODIUM 5000 UNITS: 5000 INJECTION, SOLUTION INTRAVENOUS; SUBCUTANEOUS at 14:53

## 2018-11-10 RX ADMIN — THIAMINE HCL TAB 100 MG 100 MG: 100 TAB at 05:32

## 2018-11-10 RX ADMIN — AMPICILLIN SODIUM AND SULBACTAM SODIUM 3 G: 2; 1 INJECTION, POWDER, FOR SOLUTION INTRAMUSCULAR; INTRAVENOUS at 03:00

## 2018-11-10 RX ADMIN — FOLIC ACID 1 MG: 1 TABLET ORAL at 05:32

## 2018-11-10 RX ADMIN — INFLUENZA A VIRUS A/MICHIGAN/45/2015 X-275 (H1N1) ANTIGEN (FORMALDEHYDE INACTIVATED), INFLUENZA A VIRUS A/SINGAPORE/INFIMH-16-0019/2016 IVR-186 (H3N2) ANTIGEN (FORMALDEHYDE INACTIVATED), INFLUENZA B VIRUS B/PHUKET/3073/2013 ANTIGEN (FORMALDEHYDE INACTIVATED), AND INFLUENZA B VIRUS B/MARYLAND/15/2016 BX-69A ANTIGEN (FORMALDEHYDE INACTIVATED) 0.5 ML: 15; 15; 15; 15 INJECTION, SUSPENSION INTRAMUSCULAR at 05:40

## 2018-11-10 ASSESSMENT — LIFESTYLE VARIABLES
VISUAL DISTURBANCES: NOT PRESENT
AGITATION: SOMEWHAT MORE THAN NORMAL ACTIVITY
AUDITORY DISTURBANCES: NOT PRESENT
VISUAL DISTURBANCES: NOT PRESENT
TREMOR: MODERATE TREMOR WITH ARMS EXTENDED
NAUSEA AND VOMITING: NO NAUSEA AND NO VOMITING
AGITATION: NORMAL ACTIVITY
PAROXYSMAL SWEATS: BARELY PERCEPTIBLE SWEATING. PALMS MOIST
HEADACHE, FULLNESS IN HEAD: VERY MILD
TREMOR: *
TREMOR: *
AGITATION: *
VISUAL DISTURBANCES: NOT PRESENT
HEADACHE, FULLNESS IN HEAD: NOT PRESENT
VISUAL DISTURBANCES: NOT PRESENT
TREMOR: NO TREMOR
AUDITORY DISTURBANCES: NOT PRESENT
ANXIETY: NO ANXIETY (AT EASE)
ORIENTATION AND CLOUDING OF SENSORIUM: ORIENTED AND CAN DO SERIAL ADDITIONS
HEADACHE, FULLNESS IN HEAD: NOT PRESENT
NAUSEA AND VOMITING: NO NAUSEA AND NO VOMITING
VISUAL DISTURBANCES: NOT PRESENT
NAUSEA AND VOMITING: NO NAUSEA AND NO VOMITING
NAUSEA AND VOMITING: NO NAUSEA AND NO VOMITING
AUDITORY DISTURBANCES: NOT PRESENT
NAUSEA AND VOMITING: NO NAUSEA AND NO VOMITING
HEADACHE, FULLNESS IN HEAD: MILD
ORIENTATION AND CLOUDING OF SENSORIUM: CANNOT DO SERIAL ADDITIONS OR IS UNCERTAIN ABOUT DATE
ORIENTATION AND CLOUDING OF SENSORIUM: CANNOT DO SERIAL ADDITIONS OR IS UNCERTAIN ABOUT DATE
AGITATION: SOMEWHAT MORE THAN NORMAL ACTIVITY
ANXIETY: MILDLY ANXIOUS
NAUSEA AND VOMITING: MILD NAUSEA WITH NO VOMITING
ANXIETY: *
AUDITORY DISTURBANCES: NOT PRESENT
ANXIETY: *
NAUSEA AND VOMITING: NO NAUSEA AND NO VOMITING
PAROXYSMAL SWEATS: BARELY PERCEPTIBLE SWEATING. PALMS MOIST
ORIENTATION AND CLOUDING OF SENSORIUM: ORIENTED AND CAN DO SERIAL ADDITIONS
ORIENTATION AND CLOUDING OF SENSORIUM: CANNOT DO SERIAL ADDITIONS OR IS UNCERTAIN ABOUT DATE
TOTAL SCORE: 3
ORIENTATION AND CLOUDING OF SENSORIUM: ORIENTED AND CAN DO SERIAL ADDITIONS
HEADACHE, FULLNESS IN HEAD: NOT PRESENT
TREMOR: TREMOR NOT VISIBLE BUT CAN BE FELT, FINGERTIP TO FINGERTIP
AUDITORY DISTURBANCES: NOT PRESENT
ANXIETY: *
AGITATION: NORMAL ACTIVITY
PAROXYSMAL SWEATS: NO SWEAT VISIBLE
PAROXYSMAL SWEATS: NO SWEAT VISIBLE
TOTAL SCORE: 3
TOTAL SCORE: 12
AGITATION: SOMEWHAT MORE THAN NORMAL ACTIVITY
AUDITORY DISTURBANCES: NOT PRESENT
TOTAL SCORE: 8
PAROXYSMAL SWEATS: NO SWEAT VISIBLE
ORIENTATION AND CLOUDING OF SENSORIUM: ORIENTED AND CAN DO SERIAL ADDITIONS
ANXIETY: NO ANXIETY (AT EASE)
HEADACHE, FULLNESS IN HEAD: VERY MILD
TREMOR: *
PAROXYSMAL SWEATS: NO SWEAT VISIBLE
TOTAL SCORE: 13
AUDITORY DISTURBANCES: NOT PRESENT
AGITATION: NORMAL ACTIVITY
TREMOR: TREMOR NOT VISIBLE BUT CAN BE FELT, FINGERTIP TO FINGERTIP
ANXIETY: NO ANXIETY (AT EASE)
PAROXYSMAL SWEATS: BARELY PERCEPTIBLE SWEATING. PALMS MOIST
TOTAL SCORE: 2
TOTAL SCORE: 3
VISUAL DISTURBANCES: NOT PRESENT
HEADACHE, FULLNESS IN HEAD: NOT PRESENT
VISUAL DISTURBANCES: NOT PRESENT

## 2018-11-10 ASSESSMENT — PATIENT HEALTH QUESTIONNAIRE - PHQ9
2. FEELING DOWN, DEPRESSED, IRRITABLE, OR HOPELESS: NOT AT ALL
1. LITTLE INTEREST OR PLEASURE IN DOING THINGS: NOT AT ALL
SUM OF ALL RESPONSES TO PHQ9 QUESTIONS 1 AND 2: 0

## 2018-11-10 ASSESSMENT — PAIN SCALES - GENERAL
PAINLEVEL_OUTOF10: 0
PAINLEVEL_OUTOF10: 0

## 2018-11-10 NOTE — PROGRESS NOTES
"Bedside report received.  Assessment complete.  A&O x 3 disoriented to time and drowsy. Patient does not call appropriately, is impulsive and forgetful, requires close watching/monitoring  Patient up with x1 assist unsteady gait. Bed alarm on .   Patient has 0 /10 pain. Denying any need for pain med at this time  Denies N&V. Tolerating regular diet.  Wound to LLE, open to air, reaches rom lower calf up to mid thigh, reddened, moderate amount of serousanguineous drainage, peeling, pt reporting minimal pain from site.   + void, + flatus  Patient denies SOB.  SCD's on RLE while in bed.  Patient pleasant with staff, CIWA protocol in place. No need for ativan.  Review plan with of care with patient. Call light and personal belongings with in reach. Hourly rounding in place. All needs met at this time.    1153 received call from lab stating pt has specific bacteria growing in wound, along with MRSA. Sent page to Moises OHARA to inform of new finding, as suggested by . Awaiting call back    1217 MD De Leon returned call, stated pt is on \"more than enough\" when asked which antbx he is on. No new orders received.     1645 wet to dry dressing applied to LLE, pt tolerated well, awaiting WCON to see pt. Wound pics taken, uploaded to chart.     1822 pt now oriented x4 and much more awake, CIWA score have been consistently lowered today.   "

## 2018-11-10 NOTE — CARE PLAN
Problem: Infection  Goal: Will remain free from infection  Outcome: PROGRESSING AS EXPECTED  Apply WTD dressing to wound while waiting for wound care consult.     Problem: Skin Integrity  Goal: Risk for impaired skin integrity will decrease  Outcome: PROGRESSING AS EXPECTED  Apply WTD, cleanse with NS

## 2018-11-10 NOTE — CARE PLAN
Problem: Safety  Goal: Will remain free from injury  Outcome: PROGRESSING SLOWER THAN EXPECTED  Educated pt to call prior to getting out of bed. Pt has not complied. Will continue to educate. Bed alarm in place.     Problem: Infection  Goal: Will remain free from infection  Outcome: PROGRESSING SLOWER THAN EXPECTED  Pt needs to be reminded to perform hygiene in general. Will continue to educate.

## 2018-11-10 NOTE — PROGRESS NOTES
2 RN skin check performed. Large Wound on LLE interior and lateral. Pt states from previous injury. Multiple small skin tears on Bilat FA and Elbow. Multiple scabbed over skin tears between both R and L foot toes. No findings on sacral area or the shoulders.

## 2018-11-10 NOTE — ASSESSMENT & PLAN NOTE
Nonhealing wound of the left lower leg  Superficial cultures are positive for MRSA and group A strep  There are no other signs of systemic infection  Appreciate ID consultation, continue Bactrim to complete 7 days  Still having scant oozing through bandages, needs additional wound care today

## 2018-11-10 NOTE — PROGRESS NOTES
"Recievd report from ER RN. /77   Pulse (!) 104   Temp 36.4 °C (97.6 °F)   Resp 18   Ht 1.778 m (5' 10\")   Wt 67.6 kg (149 lb 0.5 oz)   SpO2 91%   BMI 21.38 kg/m²   Ambulated well to  Bathroom. Obvious shakes in upper extremities. Denies pain. No numbness or tingling.   "

## 2018-11-10 NOTE — PROGRESS NOTES
"Pharmacy Kinetics 63 y.o. male on vancomycin day # 1 (LD 1700mg @2240)  2018    Currently on Vancomycin 1000 mg iv q12hr (15mg/kg q12h)    Indication for Treatment: Soft skin tissue infection    Pertinent history per medical record: Admitted on 2018 for Sepsis. Patient received a cut on left leg in May and was treated at [facility] and was found to have necrotizing fasciitis. A debridement was performed. However, patient struggles with alcoholism, poor wound care, and none compliance with his antibiotics. Patient currently presents with complaint of malodorous discharge from surgical sites.  Patient was previously at [facility] (10/24) and received one dose of vanco and had a positive wound culture for MRSA and beta hemolytic streptococcus group A.    Other antibiotics: Unasyn 3 grams q 6 h    Allergies: Patient has no known allergies.     List concerns for renal function none at this time    Pertinent cultures to date:    10/24/2018 13:26   Significant Indicator POS (POS)   Site LEFT LEG   Source WND   New wound culture has been drawn and is currently in process (18)    Recent Labs      18   1830   WBC  5.8   NEUTSPOLYS  48.80     Recent Labs      18   1830   BUN  10   CREATININE  0.70   ALBUMIN  3.4      Blood pressure 122/67, pulse (!) 104, temperature 37.1 °C (98.8 °F), resp. rate 18, height 1.778 m (5' 10\"), weight 67.6 kg (149 lb 0.5 oz), SpO2 95 %. Temp (24hrs), Av.1 °C (98.8 °F), Min:37.1 °C (98.8 °F), Max:37.1 °C (98.8 °F)      A/P   1. Vancomycin dose change: New start  2. Next vancomycin level: prior to 3rd dose (not ordered)  3. Goal trough: 12-16  4. Comments: Given patient's current vitals, renal function and BMI minimal concern for vanco accumulation. Patient has been given vanco at [facility] previously. However, he has not received more than one loading dose and one maintanace dose. No level was drawn at that time.     Luis Shaikh, PharmD    Reviewed by " Sridhar Swain

## 2018-11-10 NOTE — ASSESSMENT & PLAN NOTE
This is sepsis (without associated acute organ dysfunction).   Source left lower extremity wound  Superficial culture indicates MRSA and beta Hemolytic Streptococcus group A   Sepsis is resolved

## 2018-11-10 NOTE — ED NOTES
Pt taking off BP cuff multiple times, educated RN needs VS for assessments and to properly care for him, pt apologetic and agrees for cuff to remain.

## 2018-11-10 NOTE — ASSESSMENT & PLAN NOTE
Patient had been in ICU potential alcohol withdrawal  CIWA protocol has been stopped  He is relatively cooperative and appropriate  Would avoid additional benzodiazepines

## 2018-11-10 NOTE — ED PROVIDER NOTES
ED Provider Note    CHIEF COMPLAINT  Chief Complaint   Patient presents with   • Wound Check     pt reports walking on trial and ground gave out and cut open left leg, insident happened in May.  from mid thigh to ankle, dried drainage to dressing, swelling noted. pt reports pain to site.       HPI  Bola Varela is a 63 y.o. male who presents for evaluation of infection to his left leg.  The patient had initial injury in May where he presented to the emergency department at [facility] found to have necrotizing fasciitis.  He had debridement performed by surgery.  He has been struggling with alcoholism and poor wound care.  He was recently admitted to the hospital and advised to follow-up with wound care which he did not do.  He is now presenting to the emergency department with a complaint of malodorous discharge from his surgical sites.  He is not taking his antibiotics.  He does have a history of alcoholism poor compliance he is a very poor historian.  He denies any fever chills    REVIEW OF SYSTEMS  See HPI for further details.  Limited due to patient's poor historical ability    PAST MEDICAL HISTORY  Past Medical History:   Diagnosis Date   • Psychiatric problem    • Restless leg    • Tobacco use        FAMILY HISTORY  Family History   Problem Relation Age of Onset   • Heart Disease Mother    • No Known Problems Father    • Cancer Neg Hx    • Diabetes Neg Hx    • Stroke Neg Hx        SOCIAL HISTORY  Social History     Social History   • Marital status: Single     Spouse name: N/A   • Number of children: N/A   • Years of education: N/A     Social History Main Topics   • Smoking status: Current Every Day Smoker     Packs/day: 0.30     Years: 40.00     Types: Cigarettes   • Smokeless tobacco: Never Used      Comment: 1 ppd until few years ago   • Alcohol use No      Comment: previous heavy alcohol use, on and off   • Drug use: Yes     Types: Marijuana, Inhaled      Comment: marijuana daily   "  • Sexual activity: Not Currently     Partners: Female     Other Topics Concern   • Not on file     Social History Narrative   • No narrative on file       SURGICAL HISTORY  Past Surgical History:   Procedure Laterality Date   • ANKLE ORIF Right        CURRENT MEDICATIONS  Home Medications     Reviewed by Ladan Mcgregor R.N. (Registered Nurse) on 11/09/18 at 1500  Med List Status: Partial   Medication Last Dose Status   amoxicillin-clavulanate (AUGMENTIN) 875-125 MG Tab  Active   sulfamethoxazole-trimethoprim (BACTRIM DS) 800-160 MG tablet  Active                 ALLERGIES  No Known Allergies    PHYSICAL EXAM  VITAL SIGNS: /67   Pulse (!) 104   Temp 37.1 °C (98.8 °F)   Resp 18   Ht 1.778 m (5' 10\")   Wt 67.6 kg (149 lb 0.5 oz)   SpO2 95%   BMI 21.38 kg/m²   Constitutional :  Well developed, Well nourished, No acute distress, Non-toxic appearance.   HENT: Is atraumatic, moist mucous membranes  Eyes: Normal-appearing nonicteric  Neck: Normal range of motion, No tenderness, Supple, No stridor.   Lymphatic: No cervical adenopathy is.   Cardiovascular: Tachycardia noted normal rhythm, No murmurs, No rubs, No gallops.   Thorax & Lungs: Equal breath sounds bilaterally no rales rhonchi  Skin: Warm, Dry, No erythema, No rash.   Abdomen soft nontender   he has evidence of previous surgical wound to his left leg.  He has an open wound there is some purulence foul-smelling odor  Neurologically he is awake he is alert he has no focal neurological finding      Results for orders placed or performed during the hospital encounter of 11/09/18   CBC WITH DIFFERENTIAL   Result Value Ref Range    WBC 5.8 4.8 - 10.8 K/uL    RBC 3.76 (L) 4.70 - 6.10 M/uL    Hemoglobin 9.7 (L) 14.0 - 18.0 g/dL    Hematocrit 30.2 (L) 42.0 - 52.0 %    MCV 80.3 (L) 81.4 - 97.8 fL    MCH 25.8 (L) 27.0 - 33.0 pg    MCHC 32.1 (L) 33.7 - 35.3 g/dL    RDW 53.1 (H) 35.9 - 50.0 fL    Platelet Count 380 164 - 446 K/uL    MPV 8.5 (L) 9.0 - 12.9 fL    " Neutrophils-Polys 48.80 44.00 - 72.00 %    Lymphocytes 24.00 22.00 - 41.00 %    Monocytes 25.70 (H) 0.00 - 13.40 %    Eosinophils 0.20 0.00 - 6.90 %    Basophils 1.00 0.00 - 1.80 %    Immature Granulocytes 0.30 0.00 - 0.90 %    Nucleated RBC 0.00 /100 WBC    Neutrophils (Absolute) 2.85 1.82 - 7.42 K/uL    Lymphs (Absolute) 1.40 1.00 - 4.80 K/uL    Monos (Absolute) 1.50 (H) 0.00 - 0.85 K/uL    Eos (Absolute) 0.01 0.00 - 0.51 K/uL    Baso (Absolute) 0.06 0.00 - 0.12 K/uL    Immature Granulocytes (abs) 0.02 0.00 - 0.11 K/uL    NRBC (Absolute) 0.00 K/uL   COMP METABOLIC PANEL   Result Value Ref Range    Sodium 140 135 - 145 mmol/L    Potassium 4.1 3.6 - 5.5 mmol/L    Chloride 103 96 - 112 mmol/L    Co2 23 20 - 33 mmol/L    Anion Gap 14.0 (H) 0.0 - 11.9    Glucose 91 65 - 99 mg/dL    Bun 10 8 - 22 mg/dL    Creatinine 0.70 0.50 - 1.40 mg/dL    Calcium 8.9 8.5 - 10.5 mg/dL    AST(SGOT) 33 12 - 45 U/L    ALT(SGPT) 20 2 - 50 U/L    Alkaline Phosphatase 124 (H) 30 - 99 U/L    Total Bilirubin 0.4 0.1 - 1.5 mg/dL    Albumin 3.4 3.2 - 4.9 g/dL    Total Protein 7.1 6.0 - 8.2 g/dL    Globulin 3.7 (H) 1.9 - 3.5 g/dL    A-G Ratio 0.9 g/dL   LACTIC ACID   Result Value Ref Range    Lactic Acid 2.2 (H) 0.5 - 2.0 mmol/L   ESTIMATED GFR   Result Value Ref Range    GFR If African American >60 >60 mL/min/1.73 m 2    GFR If Non African American >60 >60 mL/min/1.73 m 2             COURSE & MEDICAL DECISION MAKING  Pertinent Labs & Imaging studies reviewed. (See chart for details)  Patient is presenting for evaluation of his wound from previous surgery he has evidence of poor hygiene per wound care and now has recurrence of infection.  He has no evidence of sepsis.  The patient will be admitted to the hospital staff for wound care and antibiotic coverage    FINAL IMPRESSION  1.  Wound infection  2.   3.      Electronically signed by: Andrey Parish, 11/9/2018

## 2018-11-10 NOTE — ED NOTES
"Pt resting in stretcher, c/o multiple pain sites, legs, \"everything\" \"I bruised my ribs\" pt has poor hygiene, wound foul smelling, plan for blood cultures prior to abx as well as wound cx. Pt yelling, RN to bedside, pt states she \"dropped hte remote\" and \"i got all upset\" pt educated to try to remain calm and RN can assist him in room. Pt provided with urinal then states \"I don't have to go anymore\".   "

## 2018-11-10 NOTE — ASSESSMENT & PLAN NOTE
Spent 7 minutes on tobacco cessation counseling including nicotine patches, gum, and dangers of smoking.    Also discussed options of nicotine patch, medical treatment with wellbutrin and chantix. Discussed the risks of smoking including increased risk of heart disease, stroke, cancer and COPD.     Discussed the benefits of quitting smoking.     Nicotine replacement protocol discussed with patient.  Patient not interested in stopping at this time although acknowledges that lack of funds severely limits his smoking habits.    17192 (smoking and tobacco cessation counseling visit; intermediate, greater than 3 minutes up to 10 minutes)

## 2018-11-10 NOTE — H&P
Hospital Medicine History & Physical Note    Date of Service  11/9/2018    Primary Care Physician  No primary care provider on file.    Consultants  none    Code Status  full    Chief Complaint  Wound infection     History of Presenting Illness  63 y.o. male who presented 11/9/2018 with left leg wound infection.  Patient had an initial injury in May when he was seen at [facility] and had necrotizing fasciitis this was debrided by surgery.  He has had poor wound healing since and has been not following up with wound care taking his oral antibiotics.  He is a chronic alcoholic.  He states he was recently discharged from this hospital placed on oral antibiotics secondary to MRSA was on Augmentin and Bactrim.  He failed to  his medications.  He now presents with worsening left lower extremity wound.  He has a significant amount of pain and swelling.  He is a very poor historian and drinks alcohol daily.    Review of Systems  Review of Systems   Constitutional: Positive for chills and fever.   HENT: Negative for congestion, hearing loss and tinnitus.    Eyes: Negative for blurred vision, double vision and discharge.   Respiratory: Negative for cough, hemoptysis and shortness of breath.    Cardiovascular: Negative for chest pain, palpitations and leg swelling.   Gastrointestinal: Negative for abdominal pain, heartburn, nausea and vomiting.   Genitourinary: Negative for dysuria and flank pain.   Musculoskeletal: Negative for joint pain and myalgias.   Skin: Positive for rash.   Neurological: Positive for weakness. Negative for dizziness, sensory change, speech change and focal weakness.   Endo/Heme/Allergies: Negative for environmental allergies. Does not bruise/bleed easily.   Psychiatric/Behavioral: Negative for depression, hallucinations and substance abuse.       Past Medical History   has a past medical history of Psychiatric problem; Restless leg; and Tobacco use.    Surgical History   has a past surgical  history that includes ankle orif (Right).     Family History  family history includes Heart Disease in his mother; No Known Problems in his father.     Social History   reports that he has been smoking Cigarettes.  He has a 12.00 pack-year smoking history. He has never used smokeless tobacco. He reports that he uses drugs, including Marijuana and Inhaled. He reports that he does not drink alcohol.    Allergies  No Known Allergies    Medications  Prior to Admission Medications   Prescriptions Last Dose Informant Patient Reported? Taking?   amoxicillin-clavulanate (AUGMENTIN) 875-125 MG Tab   No No   Sig: Take 1 Tab by mouth every 12 hours.   sulfamethoxazole-trimethoprim (BACTRIM DS) 800-160 MG tablet   No No   Sig: Take 1 Tab by mouth every 12 hours.      Facility-Administered Medications: None       Physical Exam  Temp:  [37.1 °C (98.8 °F)] 37.1 °C (98.8 °F)  Pulse:  [104] 104  Resp:  [18] 18  BP: (122)/(67) 122/67    Physical Exam   Constitutional: He is oriented to person, place, and time. He appears well-developed and well-nourished.   discheveled   HENT:   Head: Normocephalic and atraumatic.   Eyes: Pupils are equal, round, and reactive to light. Conjunctivae and EOM are normal.   Neck: Normal range of motion. Neck supple. No JVD present.   Cardiovascular: Normal rate, regular rhythm, normal heart sounds and intact distal pulses.    No murmur heard.  Pulmonary/Chest: Effort normal and breath sounds normal. No respiratory distress. He exhibits no tenderness.   Abdominal: Soft. Bowel sounds are normal. He exhibits no distension. There is no tenderness.   Musculoskeletal: Normal range of motion. He exhibits edema.   Neurological: He is alert and oriented to person, place, and time. No cranial nerve deficit. He exhibits normal muscle tone.   Skin: Skin is warm and dry. There is erythema.   Left lower extremity wound on the posterior aspect of the thigh foul-smelling positive drainage   Psychiatric: He has a normal  mood and affect. His behavior is normal. Judgment and thought content normal.   Nursing note and vitals reviewed.      Laboratory:  Recent Labs      11/09/18   1830   WBC  5.8   RBC  3.76*   HEMOGLOBIN  9.7*   HEMATOCRIT  30.2*   MCV  80.3*   MCH  25.8*   MCHC  32.1*   RDW  53.1*   PLATELETCT  380   MPV  8.5*     Recent Labs      11/09/18   1830   SODIUM  140   POTASSIUM  4.1   CHLORIDE  103   CO2  23   GLUCOSE  91   BUN  10   CREATININE  0.70   CALCIUM  8.9     Recent Labs      11/09/18   1830   ALTSGPT  20   ASTSGOT  33   ALKPHOSPHAT  124*   TBILIRUBIN  0.4   GLUCOSE  91                 No results for input(s): TROPONINI in the last 72 hours.    Urinalysis:    No results found     Imaging:  No orders to display         Assessment/Plan:  I anticipate this patient will require at least two midnights for appropriate medical management, necessitating inpatient admission.    * Sepsis (HCC)   Assessment & Plan    This is sepsis (without associated acute organ dysfunction).   Sources left lower extremity wound  Continue on broad-spectrum antibiotics has a history of necrotizing fasciitis has been noncompliant with outpatient antibiotics  Wound care  Follow cultures  De-escalate therapy as clinically appropriate     Open wound of left lower leg- (present on admission)   Assessment & Plan    Wound care  IV abx  Follow cultures     Microcytic anemia- (present on admission)   Assessment & Plan    At baseline with no signs of acute bleeding   Cont to monitor cbc     Severe protein-calorie malnutrition (HCC)   Assessment & Plan    Nutrition consult       Debility   Assessment & Plan    Needs pt/ot eval      Alcohol abuse   Assessment & Plan    Alcohol withdrawal protocol ordered  Multivitamin     Lactic acidosis   Assessment & Plan    Continue IV fluids and trend lactic     Tobacco use- (present on admission)   Assessment & Plan    greater than 10 minutes spent with the patient smoking cessation counseling we discussed  cardiovascular risk factor to smoking.  Nicotine patch provided         VTE prophylaxis: heparin

## 2018-11-11 LAB
ALBUMIN SERPL BCP-MCNC: 2.9 G/DL (ref 3.2–4.9)
AMMONIA PLAS-SCNC: 44 UMOL/L (ref 11–45)
BASOPHILS # BLD AUTO: 1.3 % (ref 0–1.8)
BASOPHILS # BLD: 0.06 K/UL (ref 0–0.12)
BUN SERPL-MCNC: 7 MG/DL (ref 8–22)
CALCIUM SERPL-MCNC: 8.3 MG/DL (ref 8.5–10.5)
CHLORIDE SERPL-SCNC: 104 MMOL/L (ref 96–112)
CO2 SERPL-SCNC: 25 MMOL/L (ref 20–33)
CREAT SERPL-MCNC: 0.66 MG/DL (ref 0.5–1.4)
EOSINOPHIL # BLD AUTO: 0.06 K/UL (ref 0–0.51)
EOSINOPHIL NFR BLD: 1.3 % (ref 0–6.9)
ERYTHROCYTE [DISTWIDTH] IN BLOOD BY AUTOMATED COUNT: 54.6 FL (ref 35.9–50)
GLUCOSE SERPL-MCNC: 113 MG/DL (ref 65–99)
HCT VFR BLD AUTO: 29.4 % (ref 42–52)
HGB BLD-MCNC: 9.1 G/DL (ref 14–18)
IMM GRANULOCYTES # BLD AUTO: 0.02 K/UL (ref 0–0.11)
IMM GRANULOCYTES NFR BLD AUTO: 0.4 % (ref 0–0.9)
LYMPHOCYTES # BLD AUTO: 1.12 K/UL (ref 1–4.8)
LYMPHOCYTES NFR BLD: 24.1 % (ref 22–41)
MAGNESIUM SERPL-MCNC: 1.7 MG/DL (ref 1.5–2.5)
MCH RBC QN AUTO: 25.6 PG (ref 27–33)
MCHC RBC AUTO-ENTMCNC: 31 G/DL (ref 33.7–35.3)
MCV RBC AUTO: 82.8 FL (ref 81.4–97.8)
MONOCYTES # BLD AUTO: 1.07 K/UL (ref 0–0.85)
MONOCYTES NFR BLD AUTO: 23 % (ref 0–13.4)
NEUTROPHILS # BLD AUTO: 2.32 K/UL (ref 1.82–7.42)
NEUTROPHILS NFR BLD: 49.9 % (ref 44–72)
NRBC # BLD AUTO: 0 K/UL
NRBC BLD-RTO: 0 /100 WBC
PHOSPHATE SERPL-MCNC: 3.5 MG/DL (ref 2.5–4.5)
PLATELET # BLD AUTO: 332 K/UL (ref 164–446)
PMV BLD AUTO: 8.5 FL (ref 9–12.9)
POTASSIUM SERPL-SCNC: 3.8 MMOL/L (ref 3.6–5.5)
RBC # BLD AUTO: 3.55 M/UL (ref 4.7–6.1)
SODIUM SERPL-SCNC: 137 MMOL/L (ref 135–145)
VANCOMYCIN TROUGH SERPL-MCNC: 11.4 UG/ML (ref 10–20)
WBC # BLD AUTO: 4.7 K/UL (ref 4.8–10.8)

## 2018-11-11 PROCEDURE — 700105 HCHG RX REV CODE 258: Performed by: HOSPITALIST

## 2018-11-11 PROCEDURE — 770021 HCHG ROOM/CARE - ISO PRIVATE

## 2018-11-11 PROCEDURE — A9270 NON-COVERED ITEM OR SERVICE: HCPCS | Performed by: HOSPITALIST

## 2018-11-11 PROCEDURE — 700111 HCHG RX REV CODE 636 W/ 250 OVERRIDE (IP): Performed by: HOSPITALIST

## 2018-11-11 PROCEDURE — 80202 ASSAY OF VANCOMYCIN: CPT

## 2018-11-11 PROCEDURE — 99232 SBSQ HOSP IP/OBS MODERATE 35: CPT | Performed by: INTERNAL MEDICINE

## 2018-11-11 PROCEDURE — 85025 COMPLETE CBC W/AUTO DIFF WBC: CPT

## 2018-11-11 PROCEDURE — 700102 HCHG RX REV CODE 250 W/ 637 OVERRIDE(OP): Performed by: HOSPITALIST

## 2018-11-11 PROCEDURE — 700102 HCHG RX REV CODE 250 W/ 637 OVERRIDE(OP): Performed by: INTERNAL MEDICINE

## 2018-11-11 PROCEDURE — 83735 ASSAY OF MAGNESIUM: CPT

## 2018-11-11 PROCEDURE — A9270 NON-COVERED ITEM OR SERVICE: HCPCS | Performed by: INTERNAL MEDICINE

## 2018-11-11 PROCEDURE — 80069 RENAL FUNCTION PANEL: CPT

## 2018-11-11 PROCEDURE — 36415 COLL VENOUS BLD VENIPUNCTURE: CPT

## 2018-11-11 PROCEDURE — 82140 ASSAY OF AMMONIA: CPT

## 2018-11-11 RX ORDER — CHLORDIAZEPOXIDE HYDROCHLORIDE 5 MG/1
5 CAPSULE, GELATIN COATED ORAL 3 TIMES DAILY
Status: DISCONTINUED | OUTPATIENT
Start: 2018-11-11 | End: 2018-11-12

## 2018-11-11 RX ADMIN — LORAZEPAM 2 MG: 1 TABLET ORAL at 04:34

## 2018-11-11 RX ADMIN — SODIUM CHLORIDE: 9 INJECTION, SOLUTION INTRAVENOUS at 09:31

## 2018-11-11 RX ADMIN — AMPICILLIN SODIUM AND SULBACTAM SODIUM 3 G: 2; 1 INJECTION, POWDER, FOR SOLUTION INTRAMUSCULAR; INTRAVENOUS at 20:44

## 2018-11-11 RX ADMIN — CHLORDIAZEPOXIDE HYDROCHLORIDE 5 MG: 5 CAPSULE ORAL at 13:23

## 2018-11-11 RX ADMIN — HEPARIN SODIUM 5000 UNITS: 5000 INJECTION, SOLUTION INTRAVENOUS; SUBCUTANEOUS at 04:34

## 2018-11-11 RX ADMIN — AMPICILLIN SODIUM AND SULBACTAM SODIUM 3 G: 2; 1 INJECTION, POWDER, FOR SOLUTION INTRAMUSCULAR; INTRAVENOUS at 09:41

## 2018-11-11 RX ADMIN — THERA TABS 1 TABLET: TAB at 04:34

## 2018-11-11 RX ADMIN — VANCOMYCIN HYDROCHLORIDE 1000 MG: 100 INJECTION, POWDER, LYOPHILIZED, FOR SOLUTION INTRAVENOUS at 14:22

## 2018-11-11 RX ADMIN — LORAZEPAM 0.5 MG: 2 INJECTION INTRAMUSCULAR; INTRAVENOUS at 21:05

## 2018-11-11 RX ADMIN — AMPICILLIN SODIUM AND SULBACTAM SODIUM 3 G: 2; 1 INJECTION, POWDER, FOR SOLUTION INTRAMUSCULAR; INTRAVENOUS at 02:22

## 2018-11-11 RX ADMIN — STANDARDIZED SENNA CONCENTRATE AND DOCUSATE SODIUM 2 TABLET: 8.6; 5 TABLET, FILM COATED ORAL at 04:34

## 2018-11-11 RX ADMIN — CHLORDIAZEPOXIDE HYDROCHLORIDE 5 MG: 5 CAPSULE ORAL at 17:30

## 2018-11-11 RX ADMIN — LORAZEPAM 1.5 MG: 2 INJECTION INTRAMUSCULAR; INTRAVENOUS at 18:37

## 2018-11-11 RX ADMIN — LORAZEPAM 1.5 MG: 2 INJECTION INTRAMUSCULAR; INTRAVENOUS at 19:58

## 2018-11-11 RX ADMIN — AMPICILLIN SODIUM AND SULBACTAM SODIUM 3 G: 2; 1 INJECTION, POWDER, FOR SOLUTION INTRAMUSCULAR; INTRAVENOUS at 13:24

## 2018-11-11 RX ADMIN — LORAZEPAM 3 MG: 1 TABLET ORAL at 14:42

## 2018-11-11 RX ADMIN — FOLIC ACID 1 MG: 1 TABLET ORAL at 04:34

## 2018-11-11 RX ADMIN — HEPARIN SODIUM 5000 UNITS: 5000 INJECTION, SOLUTION INTRAVENOUS; SUBCUTANEOUS at 13:23

## 2018-11-11 RX ADMIN — THIAMINE HCL TAB 100 MG 100 MG: 100 TAB at 04:34

## 2018-11-11 ASSESSMENT — LIFESTYLE VARIABLES
VISUAL DISTURBANCES: NOT PRESENT
ANXIETY: MILDLY ANXIOUS
PAROXYSMAL SWEATS: NO SWEAT VISIBLE
TOTAL SCORE: 8
TOTAL SCORE: 5
HEADACHE, FULLNESS IN HEAD: NOT PRESENT
TOTAL SCORE: 3
ORIENTATION AND CLOUDING OF SENSORIUM: ORIENTED AND CAN DO SERIAL ADDITIONS
AGITATION: NORMAL ACTIVITY
AUDITORY DISTURBANCES: NOT PRESENT
TREMOR: MODERATE TREMOR WITH ARMS EXTENDED
ANXIETY: MODERATELY ANXIOUS OR GUARDED, SO ANXIETY IS INFERRED
TREMOR: TREMOR NOT VISIBLE BUT CAN BE FELT, FINGERTIP TO FINGERTIP
NAUSEA AND VOMITING: NO NAUSEA AND NO VOMITING
NAUSEA AND VOMITING: NO NAUSEA AND NO VOMITING
ORIENTATION AND CLOUDING OF SENSORIUM: ORIENTED AND CAN DO SERIAL ADDITIONS
AGITATION: *
TREMOR: MODERATE TREMOR WITH ARMS EXTENDED
TREMOR: TREMOR NOT VISIBLE BUT CAN BE FELT, FINGERTIP TO FINGERTIP
HEADACHE, FULLNESS IN HEAD: NOT PRESENT
VISUAL DISTURBANCES: NOT PRESENT
ORIENTATION AND CLOUDING OF SENSORIUM: ORIENTED AND CAN DO SERIAL ADDITIONS
AUDITORY DISTURBANCES: NOT PRESENT
AUDITORY DISTURBANCES: NOT PRESENT
HEADACHE, FULLNESS IN HEAD: NOT PRESENT
ORIENTATION AND CLOUDING OF SENSORIUM: DATE DISORIENTATION BY NO MORE THAN TWO CALENDAR DAYS
ANXIETY: *
VISUAL DISTURBANCES: NOT PRESENT
VISUAL DISTURBANCES: NOT PRESENT
ANXIETY: MILDLY ANXIOUS
VISUAL DISTURBANCES: NOT PRESENT
NAUSEA AND VOMITING: NO NAUSEA AND NO VOMITING
NAUSEA AND VOMITING: NO NAUSEA AND NO VOMITING
AGITATION: SOMEWHAT MORE THAN NORMAL ACTIVITY
TOTAL SCORE: 3
HEADACHE, FULLNESS IN HEAD: VERY MILD
TOTAL SCORE: 16
AGITATION: SOMEWHAT MORE THAN NORMAL ACTIVITY
PAROXYSMAL SWEATS: NO SWEAT VISIBLE
VISUAL DISTURBANCES: NOT PRESENT
AUDITORY DISTURBANCES: NOT PRESENT
PAROXYSMAL SWEATS: NO SWEAT VISIBLE
HEADACHE, FULLNESS IN HEAD: NOT PRESENT
VISUAL DISTURBANCES: NOT PRESENT
AUDITORY DISTURBANCES: NOT PRESENT
TREMOR: MODERATE TREMOR WITH ARMS EXTENDED
ANXIETY: MODERATELY ANXIOUS OR GUARDED, SO ANXIETY IS INFERRED
NAUSEA AND VOMITING: NO NAUSEA AND NO VOMITING
AGITATION: NORMAL ACTIVITY
AUDITORY DISTURBANCES: NOT PRESENT
ORIENTATION AND CLOUDING OF SENSORIUM: ORIENTED AND CAN DO SERIAL ADDITIONS
PAROXYSMAL SWEATS: BEADS OF SWEAT OBVIOUS ON FOREHEAD
NAUSEA AND VOMITING: NO NAUSEA AND NO VOMITING
HEADACHE, FULLNESS IN HEAD: NOT PRESENT
HEADACHE, FULLNESS IN HEAD: NOT PRESENT
AUDITORY DISTURBANCES: NOT PRESENT
NAUSEA AND VOMITING: NO NAUSEA AND NO VOMITING
ORIENTATION AND CLOUDING OF SENSORIUM: CANNOT DO SERIAL ADDITIONS OR IS UNCERTAIN ABOUT DATE
HEADACHE, FULLNESS IN HEAD: NOT PRESENT
PAROXYSMAL SWEATS: NO SWEAT VISIBLE
AUDITORY DISTURBANCES: NOT PRESENT
AGITATION: *
TOTAL SCORE: 11
AGITATION: SOMEWHAT MORE THAN NORMAL ACTIVITY
PAROXYSMAL SWEATS: NO SWEAT VISIBLE
PAROXYSMAL SWEATS: NO SWEAT VISIBLE
ANXIETY: *
PAROXYSMAL SWEATS: NO SWEAT VISIBLE
TOTAL SCORE: 4
TREMOR: TREMOR NOT VISIBLE BUT CAN BE FELT, FINGERTIP TO FINGERTIP
ANXIETY: NO ANXIETY (AT EASE)
VISUAL DISTURBANCES: NOT PRESENT
ORIENTATION AND CLOUDING OF SENSORIUM: DATE DISORIENTATION BY NO MORE THAN TWO CALENDAR DAYS
AGITATION: *
TREMOR: *
PAROXYSMAL SWEATS: NO SWEAT VISIBLE
TOTAL SCORE: 17
ORIENTATION AND CLOUDING OF SENSORIUM: ORIENTED AND CAN DO SERIAL ADDITIONS
AUDITORY DISTURBANCES: NOT PRESENT
ANXIETY: *
AUDITORY DISTURBANCES: NOT PRESENT
NAUSEA AND VOMITING: NO NAUSEA AND NO VOMITING
TOTAL SCORE: 16
ORIENTATION AND CLOUDING OF SENSORIUM: DATE DISORIENTATION BY NO MORE THAN TWO CALENDAR DAYS
HEADACHE, FULLNESS IN HEAD: NOT PRESENT
ANXIETY: NO ANXIETY (AT EASE)
PAROXYSMAL SWEATS: NO SWEAT VISIBLE
NAUSEA AND VOMITING: NO NAUSEA AND NO VOMITING
AGITATION: MODERATELY FIDGETY AND RESTLESS
VISUAL DISTURBANCES: NOT PRESENT
AGITATION: MODERATELY FIDGETY AND RESTLESS
TREMOR: *
HEADACHE, FULLNESS IN HEAD: NOT PRESENT
ORIENTATION AND CLOUDING OF SENSORIUM: ORIENTED AND CAN DO SERIAL ADDITIONS
TREMOR: MODERATE TREMOR WITH ARMS EXTENDED
TOTAL SCORE: 1
VISUAL DISTURBANCES: NOT PRESENT
ANXIETY: NO ANXIETY (AT EASE)
NAUSEA AND VOMITING: NO NAUSEA AND NO VOMITING
TREMOR: MODERATE TREMOR WITH ARMS EXTENDED

## 2018-11-11 ASSESSMENT — ENCOUNTER SYMPTOMS
ABDOMINAL PAIN: 0
DIZZINESS: 0
BLOOD IN STOOL: 0
FEVER: 0
SHORTNESS OF BREATH: 0

## 2018-11-11 ASSESSMENT — PAIN SCALES - GENERAL
PAINLEVEL_OUTOF10: 0
PAINLEVEL_OUTOF10: 0

## 2018-11-11 ASSESSMENT — PATIENT HEALTH QUESTIONNAIRE - PHQ9
1. LITTLE INTEREST OR PLEASURE IN DOING THINGS: NOT AT ALL
2. FEELING DOWN, DEPRESSED, IRRITABLE, OR HOPELESS: NOT AT ALL
SUM OF ALL RESPONSES TO PHQ9 QUESTIONS 1 AND 2: 0

## 2018-11-11 NOTE — WOUND TEAM
Attempted to see patient. Pt would like to shower before assessment. This RN will see patient this afternoon.

## 2018-11-11 NOTE — DIETARY
"Nutrition services: Day 2 of admit.  Bola Varela is a 63 y.o. male with admitting DX of Sepsis  Consult received for FTT + Poor PO/ Unintentional weight loss on admit screen    Current Problem List:  1. Sepsis   2. ETOH abuse/ Withdrawal  3. Severe PCM  4. Microcytic Anemia    Assessment:  Height: 177.8 cm (5' 10\") Weight: 67.6 kg (149 lb)  Body mass index is 21.38 kg/m². (WNL)  Diet/Intake: Regular, PO % meals    Evaluation:   1. Per MD note, pt not oriented to time, lacks insight to his problems. Per RN, pt eating well and tolerating diet with PO intake % meals. Pt with ETOH abuse, per RD judgement, FTT is not nutrition-related. Pt to benefit from SW consult for access to food after d/c as well as resources to help quit drinking.   2. Glucose: 113.   3. Pertinent Meds: Unasyn, Thiamine, Theragran, Folvite. Fluids: NS @ 125 mL/hr    Recommendations/Plan:  1. Encourage intake of meals  2. Document intake of all meals as % taken in ADL's to provide interdisciplinary communication across all shifts.   3. Monitor weight.  4. Nutrition rep will continue to see patient for ongoing meal and snack preferences.     Consult RD as needed, available PRN and to rescreen per dept policy      "

## 2018-11-11 NOTE — PROGRESS NOTES
[facility] Hospitalist Progress Note    Date of Service: 2018    Chief Complaint  63 y.o. male admitted 2018 with left leg wound infection.   Patient had an initial injury in May when he was seen at [facility] and had necrotizing fasciitis this was debrided by surgery.  He has had poor wound healing since and has been not following up with wound care taking his oral antibiotics.  He is a chronic alcoholic.    Interval Problem Update  11/10:  Patient resting calmly in bed but appears forgetful at times.    He states that his wound is bad and needs help.  Otherwise, he has no other complaint.  :  Patient reports that he feels better after he had a shower.  Patient needs to be evaluate by wound care today.    Consultants/Specialty  Wound care    Disposition  TBD        Review of Systems   Constitutional: Negative for fever.   Respiratory: Negative for shortness of breath.    Cardiovascular: Negative for chest pain.   Gastrointestinal: Negative for abdominal pain and blood in stool.   Neurological: Negative for dizziness.      Physical Exam  Laboratory/Imaging   Hemodynamics  Temp (24hrs), Av.8 °C (98.3 °F), Min:36.6 °C (97.8 °F), Max:37.1 °C (98.7 °F)   Temperature: 36.6 °C (97.8 °F)  Pulse  Av.1  Min: 75  Max: 108    Blood Pressure: 156/92      Respiratory      Respiration: 18, Pulse Oximetry: 96 %        RUL Breath Sounds: Clear, RML Breath Sounds: Diminished, RLL Breath Sounds: Clear, ROSALIA Breath Sounds: Diminished, LLL Breath Sounds: Diminished    Fluids    Intake/Output Summary (Last 24 hours) at 18 1308  Last data filed at 11/10/18 2000   Gross per 24 hour   Intake              740 ml   Output                0 ml   Net              740 ml       Nutrition  Orders Placed This Encounter   Procedures   • Diet Order Regular     Standing Status:   Standing     Number of Occurrences:   1     Order Specific Question:   Diet:     Answer:   Regular [1]     Physical Exam   Constitutional: No  distress.   HENT:   Head: Normocephalic.   Mouth/Throat: No oropharyngeal exudate.   Eyes: Pupils are equal, round, and reactive to light. Right eye exhibits no discharge. Left eye exhibits no discharge.   Neck: Neck supple. No JVD present.   Cardiovascular: Normal rate and regular rhythm.    No murmur heard.  Pulmonary/Chest: Breath sounds normal. No respiratory distress. He has no wheezes.   Abdominal: Soft. Bowel sounds are normal. He exhibits no distension.   Musculoskeletal: He exhibits tenderness.   Able to move all four extremities.   Neurological: He is alert.   Not oriented to time.  Lacks insight to his problems   Skin: Skin is dry. He is not diaphoretic.   Large open wound in left leg.   Psychiatric: He has a normal mood and affect.       Recent Labs      11/09/18   1830  11/10/18   0323  11/11/18   1005   WBC  5.8  6.5  4.7*   RBC  3.76*  3.38*  3.55*   HEMOGLOBIN  9.7*  8.6*  9.1*   HEMATOCRIT  30.2*  27.2*  29.4*   MCV  80.3*  80.5*  82.8   MCH  25.8*  25.4*  25.6*   MCHC  32.1*  31.6*  31.0*   RDW  53.1*  53.3*  54.6*   PLATELETCT  380  333  332   MPV  8.5*  8.4*  8.5*     Recent Labs      11/09/18   1830  11/10/18   0323  11/11/18   1005   SODIUM  140  136  137   POTASSIUM  4.1  3.9  3.8   CHLORIDE  103  103  104   CO2  23  26  25   GLUCOSE  91  122*  113*   BUN  10  15  7*   CREATININE  0.70  0.80  0.66   CALCIUM  8.9  8.3*  8.3*     Recent Labs      11/09/18   1830   APTT  36.6*   INR  1.09         Recent Labs      11/10/18   0323   TRIGLYCERIDE  49   HDL  71   LDL  36          Assessment/Plan     * Sepsis (HCC)   Assessment & Plan    This is sepsis (without associated acute organ dysfunction).   Sources left lower extremity wound  Continue on broad-spectrum antibiotics has a history of necrotizing fasciitis has been noncompliant with outpatient antibiotics  Wound care  Follow cultures which is growing staph aureus and streptococcus.  De-escalate therapy as clinically appropriate     Open wound of  left lower leg- (present on admission)   Assessment & Plan    Wound care to evaluate for proper treatment.  IV abx  Follow cultures     Microcytic anemia- (present on admission)   Assessment & Plan    At baseline with no signs of acute bleeding    monitor cbc     Severe protein-calorie malnutrition (HCC)   Assessment & Plan    Nutrition consult       Debility   Assessment & Plan    PT/OT to eval      Alcohol abuse   Assessment & Plan    Alcohol withdrawal protocol ordered  Multivitamin  Patient's mentation appears stable and he is not agitated.  Suspect baseline dementia from alcohol.  Continue to monitor mentation.     Lactic acidosis   Assessment & Plan    Continue IV fluids.  Lactic acid level normalized.     Tobacco use- (present on admission)   Assessment & Plan     smoking cessation counseling.Nicotine patch provided       Quality-Core Measures   Reviewed items::  Medications reviewed and Labs reviewed  DVT prophylaxis pharmacological::  Heparin  Antibiotics:  Treating active infection/contamination beyond 24 hours perioperative coverage

## 2018-11-11 NOTE — PROGRESS NOTES
Report received.  Assumed Care.  Pt in bed, 404 1. AOx4, responds appropriately.  Denies pain, SOB.  Plan of care discussed.  Explained importance of calling before getting OOB and pt verbalizes understanding.  Call light and belongings within reach, treaded slipper socks on, bed alarm in use, bed in lowest position.  Will monitor frequently.

## 2018-11-11 NOTE — CARE PLAN
Problem: Safety  Goal: Will remain free from injury  Outcome: PROGRESSING AS EXPECTED  Continue to educate pt to call prior to getting out of bed. Pt states understanding but is non compliant. Will continue to reinforce education. Bed alarm in use    Problem: Infection  Goal: Will remain free from infection  Outcome: PROGRESSING AS EXPECTED  Still needing reminder to perform hand hygiene after use of restroom.

## 2018-11-11 NOTE — PROGRESS NOTES
[facility] Hospitalist Progress Note    Date of Service: 11/10/2018    Chief Complaint  63 y.o. male admitted 2018 with left leg wound infection.   Patient had an initial injury in May when he was seen at [facility] and had necrotizing fasciitis this was debrided by surgery.  He has had poor wound healing since and has been not following up with wound care taking his oral antibiotics.  He is a chronic alcoholic.    Interval Problem Update  11/10:  Patient resting calmly in bed but appears forgetful at times.    He states that his wound is bad and needs help.  Otherwise, he has no other complaint.    Consultants/Specialty  Wound care    Disposition  TBD        Review of Systems   Constitutional: Negative for fever.   Respiratory: Negative for shortness of breath.    Cardiovascular: Negative for chest pain.   Gastrointestinal: Negative for abdominal pain and blood in stool.        Wants to eat   Neurological: Negative for dizziness.      Physical Exam  Laboratory/Imaging   Hemodynamics  Temp (24hrs), Av.9 °C (98.5 °F), Min:36.4 °C (97.6 °F), Max:37.7 °C (99.8 °F)   Temperature: 36.9 °C (98.5 °F)  Pulse  Av  Min: 80  Max: 108    Blood Pressure: 145/81, NIBP: 109/73      Respiratory      Respiration: 18, Pulse Oximetry: 97 %        RUL Breath Sounds: Clear, RML Breath Sounds: Diminished, RLL Breath Sounds: Clear, ROSALIA Breath Sounds: Diminished, LLL Breath Sounds: Diminished    Fluids    Intake/Output Summary (Last 24 hours) at 11/10/18 1826  Last data filed at 11/10/18 1600   Gross per 24 hour   Intake             3400 ml   Output              700 ml   Net             2700 ml       Nutrition  Orders Placed This Encounter   Procedures   • Diet Order Regular     Standing Status:   Standing     Number of Occurrences:   1     Order Specific Question:   Diet:     Answer:   Regular [1]     Physical Exam   Constitutional: No distress.   HENT:   Head: Normocephalic.   Mouth/Throat: No oropharyngeal exudate.   Eyes:  Pupils are equal, round, and reactive to light. Right eye exhibits no discharge. Left eye exhibits no discharge.   Neck: Neck supple. No JVD present.   Cardiovascular: Normal rate and regular rhythm.    No murmur heard.  Pulmonary/Chest: Breath sounds normal. No respiratory distress. He has no wheezes.   Abdominal: Soft. Bowel sounds are normal. He exhibits no distension.   Musculoskeletal: He exhibits tenderness.   Able to move all four extremities.   Neurological: He is alert.   Not oriented to time.  Lacks insight to his problems   Skin: Skin is dry. He is not diaphoretic.   Large open wound in left leg.   Psychiatric: He has a normal mood and affect.       Recent Labs      11/09/18   1830  11/10/18   0323   WBC  5.8  6.5   RBC  3.76*  3.38*   HEMOGLOBIN  9.7*  8.6*   HEMATOCRIT  30.2*  27.2*   MCV  80.3*  80.5*   MCH  25.8*  25.4*   MCHC  32.1*  31.6*   RDW  53.1*  53.3*   PLATELETCT  380  333   MPV  8.5*  8.4*     Recent Labs      11/09/18 1830  11/10/18   0323   SODIUM  140  136   POTASSIUM  4.1  3.9   CHLORIDE  103  103   CO2  23  26   GLUCOSE  91  122*   BUN  10  15   CREATININE  0.70  0.80   CALCIUM  8.9  8.3*     Recent Labs      11/09/18 1830   APTT  36.6*   INR  1.09         Recent Labs      11/10/18   0323   TRIGLYCERIDE  49   HDL  71   LDL  36          Assessment/Plan     * Sepsis (HCC)   Assessment & Plan    This is sepsis (without associated acute organ dysfunction).   Sources left lower extremity wound  Continue on broad-spectrum antibiotics has a history of necrotizing fasciitis has been noncompliant with outpatient antibiotics  Wound care  Follow cultures  De-escalate therapy as clinically appropriate     Open wound of left lower leg- (present on admission)   Assessment & Plan    Wound care  Wet to dry dressing for now discussed with nursing staff.  IV abx  Follow cultures     Microcytic anemia- (present on admission)   Assessment & Plan    At baseline with no signs of acute bleeding     monitor cbc     Severe protein-calorie malnutrition (HCC)   Assessment & Plan    Nutrition consult       Debility   Assessment & Plan    PT/OT to eval      Alcohol abuse   Assessment & Plan    Alcohol withdrawal protocol ordered  Multivitamin  Suspect baseline dementia from alcohol.  Continue to monitor mentation.     Lactic acidosis   Assessment & Plan    Continue IV fluids.  Lactic acid level normalized.     Tobacco use- (present on admission)   Assessment & Plan     smoking cessation counseling.Nicotine patch provided       Quality-Core Measures   Reviewed items::  Medications reviewed and Labs reviewed  DVT prophylaxis pharmacological::  Heparin  Antibiotics:  Treating active infection/contamination beyond 24 hours perioperative coverage

## 2018-11-11 NOTE — PROGRESS NOTES
"Pharmacy Kinetics 63 y.o. male on vancomycin day # 2   11/10/2018    Currently on Vancomycin 1,000mg IV q12h (1030, 2230)     Indication for Treatment: SSTI - L leg wound      Pertinent history per medical record: Admitted on 2018 for Sepsis. Patient received a cut on left leg in May and was treated at [facility] and was found to have necrotizing fasciitis. A debridement was performed. However, patient struggles with alcoholism, poor wound care, and none compliance with his antibiotics. Patient currently presents with complaint of malodorous discharge from surgical sites.  Patient was previously at [facility] (10/24) and received one dose of vanco and had a positive wound culture for MRSA and beta hemolytic streptococcus group A.     Other antibiotics: Unasyn 3 grams q 6 h     Allergies: Patient has no known allergies.    List concerns for renal function: Age, Hypoalbuminemia (falsely low SCr).      Pertinent cultures to date:   18: L leg wound culture - Group A strep and Staph Aureus - awaiting staph sensitivities     Recent Labs      18   1830  11/10/18   0323   WBC  5.8  6.5   NEUTSPOLYS  48.80  55.70     Recent Labs      18   1830  11/10/18   0323   BUN  10  15   CREATININE  0.70  0.80   ALBUMIN  3.4  2.9*     No results for input(s): VANCOTROUGH, VANCOPEAK, VANCORANDOM in the last 72 hours.  Intake/Output Summary (Last 24 hours) at 11/10/18 1616  Last data filed at 11/10/18 1600   Gross per 24 hour   Intake             3400 ml   Output              700 ml   Net             2700 ml      Blood pressure 154/78, pulse 89, temperature 37.7 °C (99.8 °F), resp. rate 18, height 1.778 m (5' 10\"), weight 67.6 kg (149 lb 0.5 oz), SpO2 92 %. Temp (24hrs), Av.9 °C (98.5 °F), Min:36.4 °C (97.6 °F), Max:37.7 °C (99.8 °F)      A/P   1. Vancomycin dose change: No.   2. Next vancomycin level: Tomorrow,  at 1000 AM   3. Goal trough: 12 - 16 mcg/mL   4. Comments: Continue current " regimen with Steady state trough level ordered for tomorrow morning prior to 4th total dose of vancomycin. Wound culture positive with Group A strep and Staph Aureus, awaiting sensitivity report. Recommend de-escalation if possible based on results. Increase in SCr 0.70 to 0.80 following vancomycin initiation, will CTM.     Chyna RomeroD

## 2018-11-11 NOTE — WOUND TEAM
"[facility]  Inpatient Services  Initial Wound and Skin Care Evaluation    Admission Date: 11/9/2018     Consult Date: 11/09/2018 @ 1936   [facility], PMH, SH: Reviewed    Unit where seen by Wound Team: [id number]     WOUND CONSULT RELATED TO:  Large nearly circumferential wound to the LLE     SUBJECTIVE:  \"How long do I have to stay in here\"      Self Report / Pain Level:  Very painful to touch, particularly the posterior aspect.       OBJECTIVE:  Pt lying in bed, up frequently to BR, Wound to LLE open to air. No waffle overlay in use.    WOUND TYPE, LOCATION, CHARACTERISTICS (Pressure Injuries: location, stage, POA or date identified)    Wound 10/24/18 Partial Thickness Wound Leg left leg, medial, lateral posterior   Wound Image        Site Assessment Red;Yellow;Painful;Brown    Meredith-wound Assessment Warm;Scar tissue    Margins Attached edges;Defined edges    Wound Length (cm) Medial Aspect: 26 cm, Lateral Posterior Aspect:17.5cm    Wound Width (cm) 13 cm Nearly circumferential; Medial Aspect: 7 cm, Lateral posterior Aspect 6 cm    Wound Depth (cm) 0.1 cm    Wound Surface Area (cm^2) 338 cm^2    Closure None    Drainage Amount Scant    Drainage Description Serosanguineous    Non-staged Wound Description Partial thickness    Treatments Cleansed;Site care;Zinc - oxide paste    Cleansing Normal Saline Irrigation    Periwound Protectant Barrier Paste    Dressing Options Gel Gauze;Nonadherent Contact Layer;Absorbent Abdominal Pad;Roll Gauze;Hypafix Tape    Dressing Cleansing/Solutions Normal Saline    Dressing Changed New    Dressing Status Clean;Dry;Intact    Dressing Change Frequency Daily    NEXT Dressing Change  11/12/18    NEXT Weekly Photo (Inpatient Only) 11/18/18    WOUND NURSE ONLY - Odor Foul;Mild    WOUND NURSE ONLY - Exposed Structures None    WOUND NURSE ONLY - Tissue Type and Percentage 100% Red    WOUND NURSE ONLY - Time Spent with Patient (mins) 90      Lab Values:    WBC:       WBC   Date/Time " Value Ref Range Status   11/11/2018 10:05 AM 4.7 (L) 4.8 - 10.8 K/uL Final     AIC:      Lab Results   Component Value Date/Time    HBA1C 5.7 (H) 11/09/2018 06:30 PM         Culture:   Obtained 11/09/2018, Results as of 11/11/18, Positive for light growth of Beta Hemolytic Streptococcus Group A.    INTERVENTIONS BY WOUND TEAM:  This RN in to see patient this AM. Pt would like to shower. This RN returned this afternoon. Wound assessed and cleansed with NS. Pt tolerated well but reports significant pain to the posterior aspect of the wound during cleansing and dressing application. Measurements and picture were obtained. The medial and lateral wounds connect posteriorly behind the knee. The addison-wounds were prepped with KEVEN Barrier paste. Hydrogel gauze was applied over the wounds. 8x10 Telfa was then placed over the hydrogel gauze in the wounds and secured with ABD pads and roll gauze. Pt up frequently during care to use the BR. Discussed with nursing possibility of a UTI. Nursing to discuss further with MD.     Dressing selection:  Hydrogel Gauze, 8x10 Telfa, ABD pads and Roll gauze.         Interdisciplinary consultation: Patient, Bedside RN (Avelina)    EVALUATION: Pt is an older noncompliant alcoholic, who is currently admitted with sepsis and possibly detoxing. Pt states he fell down a rogelio back in May and was taken to [facility] for care. Pt has large wound to his LLE that has not healed since. Pt stated he was suppose to see someone regarding wound care but never followed up. The wound is fairly dry. The wound was cleansed and patient was showered. Hydrogel was applied to assist in moisturizing the wound while debriding away dead tissue and exudate. The wound is extremely painful to touch.  Pt is currently on IV ABX.     Factors affecting wound healing: Age, Noncompliance, Alcoholism, Tobacco Use, Diabetes.  Goals: Steady decrease in wound area and depth weekly.    NURSING PLAN OF CARE ORDERS  (X):    Dressing changes: See Dressing Care orders: X  Skin care: See Skin Care orders: X  Rectal tube care: See Rectal Tube Care orders:   Other orders:    RSKIN: CURRENT (X) ORDERED (O):   Q shift [place]:  X  Q shift pressure point assessments:  X  Pressure redistribution mattress  X          NANCY          Bariatric NANCY         Bariatric foam           Heel float boots Pt too impulsive         Float Heels off Bed with Pillows  X             Barrier wipes         Barrier Cream         Barrier paste O         Sacral silicone dressing O        Silicone O2 tubing         Anchorfast         Cannula fixation Device (Tender )          Gray Foam Ear protectors           Trach with Optifoam split foam                 Waffle cushion        Waffle Overlay  O       Rectal tube or BMS         Antifungal tx      Interdry          Turn q 2 hours        Up to chair        Ambulate  X, up frequently to the BR    PT/OT        Dietician        Diabetes Education      PO X - Regular   TF     TPN     NPO   # days   Other        WOUND TEAM PLAN OF CARE (X):   NPWT change 3 x week:        Dressing changes by wound team:       Follow up as needed:  X    Other (explain):     Anticipated discharge plans (X):   SNF:           Home Care:           Outpatient Wound Center:            Self Care:            Other: X, Pt would benefit from Wound clinic or home health for wound healing. However, pt is noncompliant and it is unclear if patient would attend clinic.

## 2018-11-11 NOTE — PROGRESS NOTES
Bedside report received.  Assessment complete.  A&O x 4. Patient calls appropriately.  Patient up with stand by assist. Bed alarm on as pt is impulsive at times.   Patient has 0/10 pain. Not requesting any pain med at this time  Denies N&V. Tolerating regular diet.  Wound to LLE has WTD dressing, will be changed today.  + void, + flatus  Patient denies SOB.  SCD's refused.  Patient can be impulsive but is pleasant with staff. More awake today than yesterday, is oriented, plans for shower today.  Review plan with of care with patient. Call light and personal belongings with in reach. Hourly rounding in place. All needs met at this time.

## 2018-11-11 NOTE — CARE PLAN
Problem: Venous Thromboembolism (VTW)/Deep Vein Thrombosis (DVT) Prevention:  Goal: Patient will participate in Venous Thrombosis (VTE)/Deep Vein Thrombosis (DVT)Prevention Measures  Outcome: PROGRESSING AS EXPECTED  Encourage ambulation, give anticoags as ordered    Problem: Fluid Volume:  Goal: Will maintain balanced intake and output  Outcome: PROGRESSING AS EXPECTED  Pt has adequate PO fluid intake, obtain orders to DC MIVF

## 2018-11-12 LAB
ALBUMIN SERPL BCP-MCNC: 3 G/DL (ref 3.2–4.9)
BACTERIA WND AEROBE CULT: ABNORMAL
BASOPHILS # BLD AUTO: 0.9 % (ref 0–1.8)
BASOPHILS # BLD: 0.05 K/UL (ref 0–0.12)
BUN SERPL-MCNC: 8 MG/DL (ref 8–22)
CALCIUM SERPL-MCNC: 8.7 MG/DL (ref 8.5–10.5)
CHLORIDE SERPL-SCNC: 102 MMOL/L (ref 96–112)
CO2 SERPL-SCNC: 24 MMOL/L (ref 20–33)
CREAT SERPL-MCNC: 0.73 MG/DL (ref 0.5–1.4)
EOSINOPHIL # BLD AUTO: 0.1 K/UL (ref 0–0.51)
EOSINOPHIL NFR BLD: 1.8 % (ref 0–6.9)
ERYTHROCYTE [DISTWIDTH] IN BLOOD BY AUTOMATED COUNT: 52.2 FL (ref 35.9–50)
GLUCOSE SERPL-MCNC: 100 MG/DL (ref 65–99)
GRAM STN SPEC: ABNORMAL
HCT VFR BLD AUTO: 28.5 % (ref 42–52)
HGB BLD-MCNC: 9.1 G/DL (ref 14–18)
IMM GRANULOCYTES # BLD AUTO: 0.03 K/UL (ref 0–0.11)
IMM GRANULOCYTES NFR BLD AUTO: 0.5 % (ref 0–0.9)
LYMPHOCYTES # BLD AUTO: 1.38 K/UL (ref 1–4.8)
LYMPHOCYTES NFR BLD: 24.8 % (ref 22–41)
MCH RBC QN AUTO: 26.2 PG (ref 27–33)
MCHC RBC AUTO-ENTMCNC: 31.9 G/DL (ref 33.7–35.3)
MCV RBC AUTO: 82.1 FL (ref 81.4–97.8)
MONOCYTES # BLD AUTO: 0.98 K/UL (ref 0–0.85)
MONOCYTES NFR BLD AUTO: 17.6 % (ref 0–13.4)
NEUTROPHILS # BLD AUTO: 3.02 K/UL (ref 1.82–7.42)
NEUTROPHILS NFR BLD: 54.4 % (ref 44–72)
NRBC # BLD AUTO: 0 K/UL
NRBC BLD-RTO: 0 /100 WBC
PHOSPHATE SERPL-MCNC: 4.6 MG/DL (ref 2.5–4.5)
PLATELET # BLD AUTO: 366 K/UL (ref 164–446)
PMV BLD AUTO: 8.9 FL (ref 9–12.9)
POTASSIUM SERPL-SCNC: 3.7 MMOL/L (ref 3.6–5.5)
RBC # BLD AUTO: 3.47 M/UL (ref 4.7–6.1)
SIGNIFICANT IND 70042: ABNORMAL
SITE SITE: ABNORMAL
SODIUM SERPL-SCNC: 136 MMOL/L (ref 135–145)
SOURCE SOURCE: ABNORMAL
WBC # BLD AUTO: 5.6 K/UL (ref 4.8–10.8)

## 2018-11-12 PROCEDURE — A9270 NON-COVERED ITEM OR SERVICE: HCPCS | Performed by: INTERNAL MEDICINE

## 2018-11-12 PROCEDURE — 700102 HCHG RX REV CODE 250 W/ 637 OVERRIDE(OP): Performed by: INTERNAL MEDICINE

## 2018-11-12 PROCEDURE — 770021 HCHG ROOM/CARE - ISO PRIVATE

## 2018-11-12 PROCEDURE — G8987 SELF CARE CURRENT STATUS: HCPCS | Mod: CI

## 2018-11-12 PROCEDURE — 700102 HCHG RX REV CODE 250 W/ 637 OVERRIDE(OP): Performed by: HOSPITALIST

## 2018-11-12 PROCEDURE — 97161 PT EVAL LOW COMPLEX 20 MIN: CPT

## 2018-11-12 PROCEDURE — G8989 SELF CARE D/C STATUS: HCPCS | Mod: CI

## 2018-11-12 PROCEDURE — 80069 RENAL FUNCTION PANEL: CPT

## 2018-11-12 PROCEDURE — 700111 HCHG RX REV CODE 636 W/ 250 OVERRIDE (IP): Performed by: HOSPITALIST

## 2018-11-12 PROCEDURE — G8978 MOBILITY CURRENT STATUS: HCPCS | Mod: CI

## 2018-11-12 PROCEDURE — 85025 COMPLETE CBC W/AUTO DIFF WBC: CPT

## 2018-11-12 PROCEDURE — G8979 MOBILITY GOAL STATUS: HCPCS | Mod: CI

## 2018-11-12 PROCEDURE — G8988 SELF CARE GOAL STATUS: HCPCS | Mod: CI

## 2018-11-12 PROCEDURE — 99233 SBSQ HOSP IP/OBS HIGH 50: CPT | Performed by: INTERNAL MEDICINE

## 2018-11-12 PROCEDURE — 36415 COLL VENOUS BLD VENIPUNCTURE: CPT

## 2018-11-12 PROCEDURE — A9270 NON-COVERED ITEM OR SERVICE: HCPCS | Performed by: HOSPITALIST

## 2018-11-12 PROCEDURE — G8980 MOBILITY D/C STATUS: HCPCS | Mod: CI

## 2018-11-12 PROCEDURE — 97165 OT EVAL LOW COMPLEX 30 MIN: CPT

## 2018-11-12 PROCEDURE — 700105 HCHG RX REV CODE 258: Performed by: HOSPITALIST

## 2018-11-12 PROCEDURE — 700111 HCHG RX REV CODE 636 W/ 250 OVERRIDE (IP): Performed by: INTERNAL MEDICINE

## 2018-11-12 RX ORDER — HALOPERIDOL 5 MG/ML
2.5 INJECTION INTRAMUSCULAR EVERY 4 HOURS PRN
Status: DISCONTINUED | OUTPATIENT
Start: 2018-11-12 | End: 2018-11-17 | Stop reason: HOSPADM

## 2018-11-12 RX ORDER — LINEZOLID 600 MG/1
600 TABLET, FILM COATED ORAL EVERY 12 HOURS
Status: COMPLETED | OUTPATIENT
Start: 2018-11-12 | End: 2018-11-15

## 2018-11-12 RX ORDER — NICOTINE 21 MG/24HR
21 PATCH, TRANSDERMAL 24 HOURS TRANSDERMAL
Status: DISCONTINUED | OUTPATIENT
Start: 2018-11-12 | End: 2018-11-17 | Stop reason: HOSPADM

## 2018-11-12 RX ORDER — CHLORDIAZEPOXIDE HYDROCHLORIDE 25 MG/1
25 CAPSULE, GELATIN COATED ORAL 3 TIMES DAILY
Status: DISCONTINUED | OUTPATIENT
Start: 2018-11-12 | End: 2018-11-14

## 2018-11-12 RX ADMIN — HEPARIN SODIUM 5000 UNITS: 5000 INJECTION, SOLUTION INTRAVENOUS; SUBCUTANEOUS at 22:42

## 2018-11-12 RX ADMIN — STANDARDIZED SENNA CONCENTRATE AND DOCUSATE SODIUM 2 TABLET: 8.6; 5 TABLET, FILM COATED ORAL at 05:51

## 2018-11-12 RX ADMIN — NICOTINE 21 MG: 21 PATCH, EXTENDED RELEASE TRANSDERMAL at 13:23

## 2018-11-12 RX ADMIN — THERA TABS 1 TABLET: TAB at 05:51

## 2018-11-12 RX ADMIN — THIAMINE HCL TAB 100 MG 100 MG: 100 TAB at 05:51

## 2018-11-12 RX ADMIN — LORAZEPAM 0.5 MG: 1 TABLET ORAL at 22:39

## 2018-11-12 RX ADMIN — HALOPERIDOL LACTATE 2.5 MG: 5 INJECTION, SOLUTION INTRAMUSCULAR at 15:50

## 2018-11-12 RX ADMIN — VANCOMYCIN HYDROCHLORIDE 1000 MG: 100 INJECTION, POWDER, LYOPHILIZED, FOR SOLUTION INTRAVENOUS at 00:55

## 2018-11-12 RX ADMIN — LORAZEPAM 0.5 MG: 2 INJECTION INTRAMUSCULAR; INTRAVENOUS at 05:55

## 2018-11-12 RX ADMIN — LORAZEPAM 1.5 MG: 2 INJECTION INTRAMUSCULAR; INTRAVENOUS at 08:26

## 2018-11-12 RX ADMIN — CHLORDIAZEPOXIDE HYDROCHLORIDE 25 MG: 25 CAPSULE ORAL at 17:45

## 2018-11-12 RX ADMIN — LORAZEPAM 1.5 MG: 2 INJECTION INTRAMUSCULAR; INTRAVENOUS at 15:19

## 2018-11-12 RX ADMIN — LINEZOLID 600 MG: 600 TABLET, FILM COATED ORAL at 17:45

## 2018-11-12 RX ADMIN — STANDARDIZED SENNA CONCENTRATE AND DOCUSATE SODIUM 2 TABLET: 8.6; 5 TABLET, FILM COATED ORAL at 17:45

## 2018-11-12 RX ADMIN — AMPICILLIN SODIUM AND SULBACTAM SODIUM 3 G: 2; 1 INJECTION, POWDER, FOR SOLUTION INTRAMUSCULAR; INTRAVENOUS at 03:41

## 2018-11-12 RX ADMIN — HEPARIN SODIUM 5000 UNITS: 5000 INJECTION, SOLUTION INTRAVENOUS; SUBCUTANEOUS at 05:53

## 2018-11-12 RX ADMIN — FOLIC ACID 1 MG: 1 TABLET ORAL at 05:51

## 2018-11-12 RX ADMIN — CHLORDIAZEPOXIDE HYDROCHLORIDE 5 MG: 5 CAPSULE ORAL at 07:17

## 2018-11-12 RX ADMIN — LORAZEPAM 0.5 MG: 2 INJECTION INTRAMUSCULAR; INTRAVENOUS at 01:12

## 2018-11-12 RX ADMIN — CHLORDIAZEPOXIDE HYDROCHLORIDE 25 MG: 25 CAPSULE ORAL at 12:50

## 2018-11-12 RX ADMIN — HEPARIN SODIUM 5000 UNITS: 5000 INJECTION, SOLUTION INTRAVENOUS; SUBCUTANEOUS at 00:04

## 2018-11-12 RX ADMIN — LORAZEPAM 0.5 MG: 1 TABLET ORAL at 09:53

## 2018-11-12 RX ADMIN — LORAZEPAM 1 MG: 2 INJECTION INTRAMUSCULAR; INTRAVENOUS at 13:24

## 2018-11-12 RX ADMIN — LORAZEPAM 1.5 MG: 2 INJECTION INTRAMUSCULAR; INTRAVENOUS at 17:45

## 2018-11-12 RX ADMIN — AMPICILLIN SODIUM AND SULBACTAM SODIUM 3 G: 2; 1 INJECTION, POWDER, FOR SOLUTION INTRAMUSCULAR; INTRAVENOUS at 08:13

## 2018-11-12 RX ADMIN — LORAZEPAM 0.5 MG: 1 TABLET ORAL at 18:45

## 2018-11-12 RX ADMIN — HEPARIN SODIUM 5000 UNITS: 5000 INJECTION, SOLUTION INTRAVENOUS; SUBCUTANEOUS at 13:24

## 2018-11-12 ASSESSMENT — LIFESTYLE VARIABLES
VISUAL DISTURBANCES: NOT PRESENT
ANXIETY: *
AGITATION: SOMEWHAT MORE THAN NORMAL ACTIVITY
VISUAL DISTURBANCES: NOT PRESENT
ORIENTATION AND CLOUDING OF SENSORIUM: DATE DISORIENTATION BY MORE THAN TWO CALENDAR DAYS
AUDITORY DISTURBANCES: NOT PRESENT
ORIENTATION AND CLOUDING OF SENSORIUM: DATE DISORIENTATION BY NO MORE THAN TWO CALENDAR DAYS
AGITATION: MODERATELY FIDGETY AND RESTLESS
PAROXYSMAL SWEATS: NO SWEAT VISIBLE
AGITATION: SOMEWHAT MORE THAN NORMAL ACTIVITY
ANXIETY: *
ORIENTATION AND CLOUDING OF SENSORIUM: DATE DISORIENTATION BY MORE THAN TWO CALENDAR DAYS
ANXIETY: *
ORIENTATION AND CLOUDING OF SENSORIUM: DATE DISORIENTATION BY NO MORE THAN TWO CALENDAR DAYS
AUDITORY DISTURBANCES: NOT PRESENT
NAUSEA AND VOMITING: NO NAUSEA AND NO VOMITING
PAROXYSMAL SWEATS: NO SWEAT VISIBLE
AGITATION: SOMEWHAT MORE THAN NORMAL ACTIVITY
ORIENTATION AND CLOUDING OF SENSORIUM: DATE DISORIENTATION BY MORE THAN TWO CALENDAR DAYS
ANXIETY: *
TOTAL SCORE: 7
PAROXYSMAL SWEATS: NO SWEAT VISIBLE
AUDITORY DISTURBANCES: NOT PRESENT
HEADACHE, FULLNESS IN HEAD: NOT PRESENT
TREMOR: *
HEADACHE, FULLNESS IN HEAD: NOT PRESENT
ANXIETY: *
AUDITORY DISTURBANCES: NOT PRESENT
NAUSEA AND VOMITING: *
HEADACHE, FULLNESS IN HEAD: VERY MILD
TOTAL SCORE: 7
TREMOR: *
TREMOR: *
NAUSEA AND VOMITING: NO NAUSEA AND NO VOMITING
NAUSEA AND VOMITING: NO NAUSEA AND NO VOMITING
HEADACHE, FULLNESS IN HEAD: NOT PRESENT
AUDITORY DISTURBANCES: NOT PRESENT
ORIENTATION AND CLOUDING OF SENSORIUM: DATE DISORIENTATION BY MORE THAN TWO CALENDAR DAYS
TOTAL SCORE: 7
AGITATION: SOMEWHAT MORE THAN NORMAL ACTIVITY
TREMOR: MODERATE TREMOR WITH ARMS EXTENDED
TREMOR: *
TREMOR: *
TOTAL SCORE: 9
ANXIETY: *
AGITATION: *
PAROXYSMAL SWEATS: NO SWEAT VISIBLE
TOTAL SCORE: 8
AUDITORY DISTURBANCES: NOT PRESENT
ORIENTATION AND CLOUDING OF SENSORIUM: DATE DISORIENTATION BY MORE THAN TWO CALENDAR DAYS
PAROXYSMAL SWEATS: NO SWEAT VISIBLE
NAUSEA AND VOMITING: NO NAUSEA AND NO VOMITING
AUDITORY DISTURBANCES: NOT PRESENT
VISUAL DISTURBANCES: NOT PRESENT
AGITATION: MODERATELY FIDGETY AND RESTLESS
HEADACHE, FULLNESS IN HEAD: NOT PRESENT
AUDITORY DISTURBANCES: NOT PRESENT
AGITATION: *
NAUSEA AND VOMITING: NO NAUSEA AND NO VOMITING
VISUAL DISTURBANCES: NOT PRESENT
ANXIETY: *
PAROXYSMAL SWEATS: NO SWEAT VISIBLE
VISUAL DISTURBANCES: NOT PRESENT
TREMOR: MODERATE TREMOR WITH ARMS EXTENDED
AUDITORY DISTURBANCES: NOT PRESENT
HEADACHE, FULLNESS IN HEAD: NOT PRESENT
NAUSEA AND VOMITING: NO NAUSEA AND NO VOMITING
PAROXYSMAL SWEATS: NO SWEAT VISIBLE
TOTAL SCORE: 15
TREMOR: MODERATE TREMOR WITH ARMS EXTENDED
PAROXYSMAL SWEATS: NO SWEAT VISIBLE
PAROXYSMAL SWEATS: NO SWEAT VISIBLE
VISUAL DISTURBANCES: NOT PRESENT
NAUSEA AND VOMITING: NO NAUSEA AND NO VOMITING
TREMOR: TREMOR NOT VISIBLE BUT CAN BE FELT, FINGERTIP TO FINGERTIP
ORIENTATION AND CLOUDING OF SENSORIUM: ORIENTED AND CAN DO SERIAL ADDITIONS
HEADACHE, FULLNESS IN HEAD: MILD
TOTAL SCORE: 17
VISUAL DISTURBANCES: NOT PRESENT
HEADACHE, FULLNESS IN HEAD: VERY MILD
TOTAL SCORE: 14
TOTAL SCORE: 16
ANXIETY: MODERATELY ANXIOUS OR GUARDED, SO ANXIETY IS INFERRED
AGITATION: *
HEADACHE, FULLNESS IN HEAD: VERY MILD
NAUSEA AND VOMITING: NO NAUSEA AND NO VOMITING
VISUAL DISTURBANCES: NOT PRESENT
VISUAL DISTURBANCES: NOT PRESENT
ORIENTATION AND CLOUDING OF SENSORIUM: ORIENTED AND CAN DO SERIAL ADDITIONS
ANXIETY: *

## 2018-11-12 ASSESSMENT — COGNITIVE AND FUNCTIONAL STATUS - GENERAL
DRESSING REGULAR UPPER BODY CLOTHING: A LITTLE
HELP NEEDED FOR BATHING: A LITTLE
MOBILITY SCORE: 23
CLIMB 3 TO 5 STEPS WITH RAILING: A LITTLE
SUGGESTED CMS G CODE MODIFIER DAILY ACTIVITY: CK
TOILETING: A LITTLE
SUGGESTED CMS G CODE MODIFIER MOBILITY: CI
DAILY ACTIVITIY SCORE: 19
DRESSING REGULAR LOWER BODY CLOTHING: A LITTLE
PERSONAL GROOMING: A LITTLE

## 2018-11-12 ASSESSMENT — PAIN SCALES - GENERAL
PAINLEVEL_OUTOF10: 0

## 2018-11-12 ASSESSMENT — ACTIVITIES OF DAILY LIVING (ADL): TOILETING: INDEPENDENT

## 2018-11-12 ASSESSMENT — GAIT ASSESSMENTS
DEVIATION: ANTALGIC
DISTANCE (FEET): 50
GAIT LEVEL OF ASSIST: SUPERVISED

## 2018-11-12 NOTE — PROGRESS NOTES
"Occupational Therapy Evaluation completed.   Functional Status:  Supervision ADLs due to impulsivity and impaired memory.  Plan of Care: Patient with no further skilled OT needs in the acute care setting at this time  Discharge Recommendations:  Equipment: No Equipment Needed. Post-acute therapy Currently anticipate no further skilled therapy needs once patient is discharged from the inpatient setting.    Pt is a 62 y/o male admitted with LLE wound infection, necrotizing fascitis. MRSA of wound. Currently on IV antibiotics. Pt able to preform ADLs with supervision this date due to poor memory and impulsiveness. Pt able to mobilize to and from bathroom without AD and with supervision. Pt reports that he is currently homeless and has a history of medical compliance. Pt will likely need d/c to a transitional care unit for IV ABX. No further skilled OT indicated in acute care. Recommend participation in ADLs and mobility with nursing while in acute care to prevent functional decline. D/C OT.     See \"Rehab Therapy-Acute\" Patient Summary Report for complete documentation.    "

## 2018-11-12 NOTE — PROGRESS NOTES
"Pharmacy Kinetics 63 y.o. male on vancomycin day # 3   2018    Currently on Vancomycin 1,000mg IV q12h (1030, 2230)     Indication for Treatment: SSTI - L leg wound      Pertinent history per medical record: Admitted on 2018 for Sepsis. Patient received a cut on left leg in May and was treated at [facility] and was found to have necrotizing fasciitis. A debridement was performed. However, patient struggles with alcoholism, poor wound care, and none compliance with his antibiotics. Patient currently presents with complaint of malodorous discharge from surgical sites.  Patient was previously at [facility] (10/24) and received one dose of vanco and had a positive wound culture for MRSA and beta hemolytic streptococcus group A.     Other antibiotics: Unasyn 3 grams q 6 h     Allergies: Patient has no known allergies.     List concerns for renal function: Age, Hypoalbuminemia (falsely low SCr).       Pertinent cultures to date:   18: L leg wound culture - Group A strep and Staph Aureus - sawaiting staph sensitivities   Recent Labs      18   1830  11/10/18   0323  18   1005   WBC  5.8  6.5  4.7*   NEUTSPOLYS  48.80  55.70  49.90     Recent Labs      18   1830  11/10/18   0323  18   1005   BUN  10  15  7*   CREATININE  0.70  0.80  0.66   ALBUMIN  3.4  2.9*  2.9*     Recent Labs      18   1005   VANCOTROUGH  11.4     Intake/Output Summary (Last 24 hours) at 18 1707  Last data filed at 11/10/18 2000   Gross per 24 hour   Intake              240 ml   Output                0 ml   Net              240 ml      Blood pressure 156/92, pulse 75, temperature 36.6 °C (97.8 °F), resp. rate 18, height 1.778 m (5' 10\"), weight 67.6 kg (149 lb 0.5 oz), SpO2 96 %. Temp (24hrs), Av.8 °C (98.3 °F), Min:36.6 °C (97.8 °F), Max:37.1 °C (98.7 °F)      A/P   1. Vancomycin dose change: No.   2. Next vancomycin level: ~ 2 - 3 days (not yet ordered)   3. Goal trough: 12 - 16 mcg/mL "   4. Comments: Vancomycin trough level resulted this morning at 11.4mcg/mL, close to true trough level and just under goal range. Continue current dosing regimen w/repeat trough level in a couple days. Culture sensitivities remain in process.     Althea K. Osvaldo, PharmD

## 2018-11-12 NOTE — PROGRESS NOTES
Bedside report received.  Assessment complete.  A&O x 1.  Patient up with standby assist. Bed alarm on, pt. Impulsive and noncompliant with use of call light.   Patient has 0/10 pain.   Denies N&V. Tolerating regular diet.  Pt. Has wound to left leg, dressing placed by wound care. Dressing clean, dry, and intact.  + void, + flatus  Review plan with of care with patient. Call light and personal belongings with in reach. Hourly rounding in place. All needs met at this time.

## 2018-11-12 NOTE — THERAPY
"Physical Therapy Evaluation completed.   Bed Mobility:  Supine to Sit: Modified Independent  Transfers: Sit to Stand: Modified Independent  Gait: Level Of Assist: Supervised with No Equipment Needed       Plan of Care: Patient with no further skilled PT needs in the acute care setting at this time  Discharge Recommendations: Equipment: No Equipment Needed. Post-acute therapy Anticipate that the patient will have no further physical therapy needs after discharge from the hospital.    Mr. Varela is a 64 y/o male who presents to acute secondary to sepsis due to L LE wound and alcohol withdrawal. He presents with mild L LE quad weakness however suspect this is primarily due to dressing and location of wound. It did not impact his ability to perform gait, transfers, and bed mobility. He was able to perform all of these tasks without physical assist. Antalgic gait pattern with lack of terminal knee extension on L, however did not cause balance deficits. No additonal acute physical therapy needs. Recommend pt continue to ambulate with nursing staff to maintain current level of function.     See \"Rehab Therapy-Acute\" Patient Summary Report for complete documentation.     "

## 2018-11-12 NOTE — PROGRESS NOTES
"Pt A&Ox1, oriented to self only. Occasionally oriented to event \"im at wound care for my leg because I fell\".   Pt is anxious, jumping out of bed, unsteady, tremulous, agitated, yelling at his breakfast tray. CIWA score 16, medicated per MAR.   Educated pt to call for assistance, pt verbalizes understanding however does not call. Bed alarm on. Room near nurses station.   Tolerating regular diet, denies n/v. + bowel sounds, + flatus, LBM yesterday 11/11.  Saturating >90% on RA.  Updated on plan of care. Safety education provided. Bed locked in low. Call light within reach. Rounding in place.   "

## 2018-11-12 NOTE — PROGRESS NOTES
"1515: Pt is yelling, attempting to leave. Confused. Hitting himself. CIWA 14. Ativan 1.5 mg administered IV.  1540: Pt remains aggressive, screaming in hallway that \"that man out there is going to murder me\". Pt is repeatedly hitting himself in the head. Unable to calm pt down verbally or get pt to stop hurting himself.  Called and updated Dr. De Leon. New orders received for Haldol.   1:1 sitter remains at bedside.     "

## 2018-11-12 NOTE — CARE PLAN
Problem: Communication  Goal: The ability to communicate needs accurately and effectively will improve  Outcome: PROGRESSING SLOWER THAN EXPECTED    Intervention: Reorient patient to environment as needed  Reorieneting pt. With every encounter.   Pt noncompliant and impulsive      Problem: Safety  Goal: Will remain free from falls  Outcome: PROGRESSING AS EXPECTED  Will educate pt. On need to utilize call light for assistance and before getting up.   Bed locked and in lowest position, Non-slid slippers on, bed alarm is on.

## 2018-11-13 PROBLEM — I10 ESSENTIAL HYPERTENSION: Status: ACTIVE | Noted: 2018-11-13

## 2018-11-13 PROBLEM — F10.932 ALCOHOL WITHDRAWAL HALLUCINOSIS (HCC): Status: ACTIVE | Noted: 2018-11-09

## 2018-11-13 LAB
ALBUMIN SERPL BCP-MCNC: 3 G/DL (ref 3.2–4.9)
BASOPHILS # BLD AUTO: 0.6 % (ref 0–1.8)
BASOPHILS # BLD: 0.03 K/UL (ref 0–0.12)
BUN SERPL-MCNC: 9 MG/DL (ref 8–22)
CALCIUM SERPL-MCNC: 8.6 MG/DL (ref 8.5–10.5)
CHLORIDE SERPL-SCNC: 105 MMOL/L (ref 96–112)
CO2 SERPL-SCNC: 25 MMOL/L (ref 20–33)
CREAT SERPL-MCNC: 0.73 MG/DL (ref 0.5–1.4)
EOSINOPHIL # BLD AUTO: 0.09 K/UL (ref 0–0.51)
EOSINOPHIL NFR BLD: 1.8 % (ref 0–6.9)
ERYTHROCYTE [DISTWIDTH] IN BLOOD BY AUTOMATED COUNT: 54.2 FL (ref 35.9–50)
GLUCOSE SERPL-MCNC: 100 MG/DL (ref 65–99)
HCT VFR BLD AUTO: 30 % (ref 42–52)
HGB BLD-MCNC: 9.1 G/DL (ref 14–18)
IMM GRANULOCYTES # BLD AUTO: 0.04 K/UL (ref 0–0.11)
IMM GRANULOCYTES NFR BLD AUTO: 0.8 % (ref 0–0.9)
LYMPHOCYTES # BLD AUTO: 1.62 K/UL (ref 1–4.8)
LYMPHOCYTES NFR BLD: 33.1 % (ref 22–41)
MCH RBC QN AUTO: 25.1 PG (ref 27–33)
MCHC RBC AUTO-ENTMCNC: 30.3 G/DL (ref 33.7–35.3)
MCV RBC AUTO: 82.9 FL (ref 81.4–97.8)
MONOCYTES # BLD AUTO: 0.89 K/UL (ref 0–0.85)
MONOCYTES NFR BLD AUTO: 18.2 % (ref 0–13.4)
NEUTROPHILS # BLD AUTO: 2.23 K/UL (ref 1.82–7.42)
NEUTROPHILS NFR BLD: 45.5 % (ref 44–72)
NRBC # BLD AUTO: 0 K/UL
NRBC BLD-RTO: 0 /100 WBC
PHOSPHATE SERPL-MCNC: 3.7 MG/DL (ref 2.5–4.5)
PLATELET # BLD AUTO: 403 K/UL (ref 164–446)
PMV BLD AUTO: 9 FL (ref 9–12.9)
POTASSIUM SERPL-SCNC: 3.5 MMOL/L (ref 3.6–5.5)
RBC # BLD AUTO: 3.62 M/UL (ref 4.7–6.1)
SODIUM SERPL-SCNC: 139 MMOL/L (ref 135–145)
WBC # BLD AUTO: 4.9 K/UL (ref 4.8–10.8)

## 2018-11-13 PROCEDURE — 700102 HCHG RX REV CODE 250 W/ 637 OVERRIDE(OP): Performed by: INTERNAL MEDICINE

## 2018-11-13 PROCEDURE — 99233 SBSQ HOSP IP/OBS HIGH 50: CPT | Performed by: INTERNAL MEDICINE

## 2018-11-13 PROCEDURE — 85025 COMPLETE CBC W/AUTO DIFF WBC: CPT

## 2018-11-13 PROCEDURE — 700102 HCHG RX REV CODE 250 W/ 637 OVERRIDE(OP): Performed by: FAMILY MEDICINE

## 2018-11-13 PROCEDURE — 700102 HCHG RX REV CODE 250 W/ 637 OVERRIDE(OP): Performed by: HOSPITALIST

## 2018-11-13 PROCEDURE — 90791 PSYCH DIAGNOSTIC EVALUATION: CPT | Performed by: PSYCHOLOGIST

## 2018-11-13 PROCEDURE — 770021 HCHG ROOM/CARE - ISO PRIVATE

## 2018-11-13 PROCEDURE — 80069 RENAL FUNCTION PANEL: CPT

## 2018-11-13 PROCEDURE — A9270 NON-COVERED ITEM OR SERVICE: HCPCS | Performed by: HOSPITALIST

## 2018-11-13 PROCEDURE — 700111 HCHG RX REV CODE 636 W/ 250 OVERRIDE (IP): Performed by: HOSPITALIST

## 2018-11-13 PROCEDURE — A9270 NON-COVERED ITEM OR SERVICE: HCPCS | Performed by: FAMILY MEDICINE

## 2018-11-13 PROCEDURE — A9270 NON-COVERED ITEM OR SERVICE: HCPCS | Performed by: INTERNAL MEDICINE

## 2018-11-13 PROCEDURE — 700111 HCHG RX REV CODE 636 W/ 250 OVERRIDE (IP): Performed by: INTERNAL MEDICINE

## 2018-11-13 RX ORDER — POTASSIUM CHLORIDE 20 MEQ/1
40 TABLET, EXTENDED RELEASE ORAL ONCE
Status: COMPLETED | OUTPATIENT
Start: 2018-11-13 | End: 2018-11-13

## 2018-11-13 RX ORDER — LISINOPRIL 2.5 MG/1
5 TABLET ORAL
Status: DISCONTINUED | OUTPATIENT
Start: 2018-11-13 | End: 2018-11-17 | Stop reason: HOSPADM

## 2018-11-13 RX ORDER — HYDROCODONE BITARTRATE AND ACETAMINOPHEN 10; 325 MG/1; MG/1
1 TABLET ORAL EVERY 4 HOURS PRN
Status: DISCONTINUED | OUTPATIENT
Start: 2018-11-13 | End: 2018-11-13

## 2018-11-13 RX ORDER — HYDROCODONE BITARTRATE AND ACETAMINOPHEN 5; 325 MG/1; MG/1
1 TABLET ORAL EVERY 4 HOURS PRN
Status: DISCONTINUED | OUTPATIENT
Start: 2018-11-13 | End: 2018-11-17 | Stop reason: HOSPADM

## 2018-11-13 RX ORDER — LABETALOL HYDROCHLORIDE 5 MG/ML
10 INJECTION, SOLUTION INTRAVENOUS EVERY 6 HOURS PRN
Status: DISCONTINUED | OUTPATIENT
Start: 2018-11-13 | End: 2018-11-17 | Stop reason: HOSPADM

## 2018-11-13 RX ADMIN — HYDROCODONE BITARTRATE AND ACETAMINOPHEN 1 TABLET: 5; 325 TABLET ORAL at 21:10

## 2018-11-13 RX ADMIN — LORAZEPAM 3 MG: 1 TABLET ORAL at 20:52

## 2018-11-13 RX ADMIN — LINEZOLID 600 MG: 600 TABLET, FILM COATED ORAL at 17:18

## 2018-11-13 RX ADMIN — LORAZEPAM 1.5 MG: 2 INJECTION INTRAMUSCULAR; INTRAVENOUS at 11:34

## 2018-11-13 RX ADMIN — LORAZEPAM 0.5 MG: 1 TABLET ORAL at 03:29

## 2018-11-13 RX ADMIN — FOLIC ACID 1 MG: 1 TABLET ORAL at 05:13

## 2018-11-13 RX ADMIN — HEPARIN SODIUM 5000 UNITS: 5000 INJECTION, SOLUTION INTRAVENOUS; SUBCUTANEOUS at 20:52

## 2018-11-13 RX ADMIN — NICOTINE 21 MG: 21 PATCH, EXTENDED RELEASE TRANSDERMAL at 05:18

## 2018-11-13 RX ADMIN — THIAMINE HCL TAB 100 MG 100 MG: 100 TAB at 05:13

## 2018-11-13 RX ADMIN — HALOPERIDOL LACTATE 2.5 MG: 5 INJECTION, SOLUTION INTRAMUSCULAR at 23:56

## 2018-11-13 RX ADMIN — POTASSIUM CHLORIDE 40 MEQ: 1500 TABLET, EXTENDED RELEASE ORAL at 08:17

## 2018-11-13 RX ADMIN — STANDARDIZED SENNA CONCENTRATE AND DOCUSATE SODIUM 2 TABLET: 8.6; 5 TABLET, FILM COATED ORAL at 05:13

## 2018-11-13 RX ADMIN — HEPARIN SODIUM 5000 UNITS: 5000 INJECTION, SOLUTION INTRAVENOUS; SUBCUTANEOUS at 05:13

## 2018-11-13 RX ADMIN — LORAZEPAM 1.5 MG: 2 INJECTION INTRAMUSCULAR; INTRAVENOUS at 23:45

## 2018-11-13 RX ADMIN — LORAZEPAM 0.5 MG: 2 INJECTION INTRAMUSCULAR; INTRAVENOUS at 13:47

## 2018-11-13 RX ADMIN — LORAZEPAM 1.5 MG: 2 INJECTION INTRAMUSCULAR; INTRAVENOUS at 12:38

## 2018-11-13 RX ADMIN — CHLORDIAZEPOXIDE HYDROCHLORIDE 25 MG: 25 CAPSULE ORAL at 17:18

## 2018-11-13 RX ADMIN — HEPARIN SODIUM 5000 UNITS: 5000 INJECTION, SOLUTION INTRAVENOUS; SUBCUTANEOUS at 13:36

## 2018-11-13 RX ADMIN — LINEZOLID 600 MG: 600 TABLET, FILM COATED ORAL at 05:13

## 2018-11-13 RX ADMIN — CHLORDIAZEPOXIDE HYDROCHLORIDE 25 MG: 25 CAPSULE ORAL at 11:34

## 2018-11-13 RX ADMIN — LISINOPRIL 5 MG: 5 TABLET ORAL at 17:18

## 2018-11-13 RX ADMIN — THERA TABS 1 TABLET: TAB at 05:13

## 2018-11-13 RX ADMIN — CHLORDIAZEPOXIDE HYDROCHLORIDE 25 MG: 25 CAPSULE ORAL at 05:13

## 2018-11-13 ASSESSMENT — LIFESTYLE VARIABLES
AUDITORY DISTURBANCES: NOT PRESENT
ANXIETY: MILDLY ANXIOUS
HEADACHE, FULLNESS IN HEAD: NOT PRESENT
HEADACHE, FULLNESS IN HEAD: NOT PRESENT
VISUAL DISTURBANCES: NOT PRESENT
TOTAL SCORE: 1
TOTAL SCORE: MILD ITCHING, PINS AND NEEDLES SENSATION, BURNING OR NUMBNESS
NAUSEA AND VOMITING: NO NAUSEA AND NO VOMITING
AGITATION: *
TOTAL SCORE: 2
HEADACHE, FULLNESS IN HEAD: VERY MILD
PAROXYSMAL SWEATS: NO SWEAT VISIBLE
PAROXYSMAL SWEATS: BARELY PERCEPTIBLE SWEATING. PALMS MOIST
VISUAL DISTURBANCES: NOT PRESENT
AGITATION: MODERATELY FIDGETY AND RESTLESS
AUDITORY DISTURBANCES: NOT PRESENT
PAROXYSMAL SWEATS: NO SWEAT VISIBLE
VISUAL DISTURBANCES: NOT PRESENT
HEADACHE, FULLNESS IN HEAD: NOT PRESENT
AUDITORY DISTURBANCES: NOT PRESENT
AUDITORY DISTURBANCES: NOT PRESENT
ORIENTATION AND CLOUDING OF SENSORIUM: DATE DISORIENTATION BY NO MORE THAN TWO CALENDAR DAYS
AGITATION: MODERATELY FIDGETY AND RESTLESS
PAROXYSMAL SWEATS: NO SWEAT VISIBLE
TOTAL SCORE: 19
ORIENTATION AND CLOUDING OF SENSORIUM: DATE DISORIENTATION BY NO MORE THAN TWO CALENDAR DAYS
ORIENTATION AND CLOUDING OF SENSORIUM: DATE DISORIENTATION BY MORE THAN TWO CALENDAR DAYS
AUDITORY DISTURBANCES: NOT PRESENT
AUDITORY DISTURBANCES: NOT PRESENT
HEADACHE, FULLNESS IN HEAD: NOT PRESENT
AGITATION: SOMEWHAT MORE THAN NORMAL ACTIVITY
TOTAL SCORE: 8
TOTAL SCORE: 4
AUDITORY DISTURBANCES: NOT PRESENT
VISUAL DISTURBANCES: NOT PRESENT
PAROXYSMAL SWEATS: NO SWEAT VISIBLE
TOTAL SCORE: VERY MILD ITCHING, PINS AND NEEDLES SENSATION, BURNING OR NUMBNESS
ANXIETY: *
ORIENTATION AND CLOUDING OF SENSORIUM: CANNOT DO SERIAL ADDITIONS OR IS UNCERTAIN ABOUT DATE
ORIENTATION AND CLOUDING OF SENSORIUM: DATE DISORIENTATION BY MORE THAN TWO CALENDAR DAYS
NAUSEA AND VOMITING: MILD NAUSEA WITH NO VOMITING
NAUSEA AND VOMITING: NO NAUSEA AND NO VOMITING
TREMOR: *
AUDITORY DISTURBANCES: VERY MILD HARSHNESS OR ABILITY TO FRIGHTEN
TREMOR: TREMOR NOT VISIBLE BUT CAN BE FELT, FINGERTIP TO FINGERTIP
TOTAL SCORE: 24
TREMOR: *
ORIENTATION AND CLOUDING OF SENSORIUM: ORIENTED AND CAN DO SERIAL ADDITIONS
PAROXYSMAL SWEATS: BEADS OF SWEAT OBVIOUS ON FOREHEAD
HEADACHE, FULLNESS IN HEAD: MILD
TOTAL SCORE: 16
HEADACHE, FULLNESS IN HEAD: MILD
ORIENTATION AND CLOUDING OF SENSORIUM: DATE DISORIENTATION BY NO MORE THAN TWO CALENDAR DAYS
TOTAL SCORE: 16
NAUSEA AND VOMITING: MILD NAUSEA WITH NO VOMITING
TREMOR: MODERATE TREMOR WITH ARMS EXTENDED
VISUAL DISTURBANCES: NOT PRESENT
ANXIETY: *
AGITATION: MODERATELY FIDGETY AND RESTLESS
ANXIETY: MILDLY ANXIOUS
TREMOR: *
TREMOR: TREMOR NOT VISIBLE BUT CAN BE FELT, FINGERTIP TO FINGERTIP
ANXIETY: *
VISUAL DISTURBANCES: NOT PRESENT
TREMOR: NO TREMOR
NAUSEA AND VOMITING: MILD NAUSEA WITH NO VOMITING
AGITATION: NORMAL ACTIVITY
ANXIETY: *
ANXIETY: *
ORIENTATION AND CLOUDING OF SENSORIUM: CANNOT DO SERIAL ADDITIONS OR IS UNCERTAIN ABOUT DATE
TREMOR: *
NAUSEA AND VOMITING: NO NAUSEA AND NO VOMITING
VISUAL DISTURBANCES: NOT PRESENT
NAUSEA AND VOMITING: *
PAROXYSMAL SWEATS: BARELY PERCEPTIBLE SWEATING. PALMS MOIST
PAROXYSMAL SWEATS: NO SWEAT VISIBLE
ANXIETY: NO ANXIETY (AT EASE)
HEADACHE, FULLNESS IN HEAD: NOT PRESENT
TOTAL SCORE: 7
PAROXYSMAL SWEATS: NO SWEAT VISIBLE
TOTAL SCORE: VERY MILD ITCHING, PINS AND NEEDLES SENSATION, BURNING OR NUMBNESS
HEADACHE, FULLNESS IN HEAD: VERY MILD
AGITATION: NORMAL ACTIVITY
AGITATION: SOMEWHAT MORE THAN NORMAL ACTIVITY
NAUSEA AND VOMITING: NO NAUSEA AND NO VOMITING
TREMOR: NO TREMOR
VISUAL DISTURBANCES: NOT PRESENT
TOTAL SCORE: VERY MILD ITCHING, PINS AND NEEDLES SENSATION, BURNING OR NUMBNESS
VISUAL DISTURBANCES: NOT PRESENT
ORIENTATION AND CLOUDING OF SENSORIUM: DATE DISORIENTATION BY NO MORE THAN TWO CALENDAR DAYS
AUDITORY DISTURBANCES: NOT PRESENT
AGITATION: NORMAL ACTIVITY
NAUSEA AND VOMITING: NO NAUSEA AND NO VOMITING
ANXIETY: MODERATELY ANXIOUS OR GUARDED, SO ANXIETY IS INFERRED

## 2018-11-13 ASSESSMENT — PAIN SCALES - GENERAL
PAINLEVEL_OUTOF10: 0
PAINLEVEL_OUTOF10: 10
PAINLEVEL_OUTOF10: 0
PAINLEVEL_OUTOF10: 5

## 2018-11-13 ASSESSMENT — ENCOUNTER SYMPTOMS
SPEECH CHANGE: 0
COUGH: 0
NAUSEA: 0
FEVER: 0
HALLUCINATIONS: 0
BLURRED VISION: 0
FOCAL WEAKNESS: 0
PALPITATIONS: 0
DIZZINESS: 0
VOMITING: 0
SHORTNESS OF BREATH: 0
CHILLS: 0
MYALGIAS: 1

## 2018-11-13 NOTE — PROGRESS NOTES
[facility] Hospitalist Progress Note    Date of Service: 2018    Chief Complaint  63 y.o. male admitted 2018 with left leg wound infection.   Patient had an initial injury in May when he was seen at [facility] and had necrotizing fasciitis this was debrided by surgery.  He has had poor wound healing since and has been not following up with wound care taking his oral antibiotics.  He is a chronic alcoholic.    Interval Problem Update  11/10:  Patient resting calmly in bed but appears forgetful at times.    He states that his wound is bad and needs help.  Otherwise, he has no other complaint.  :  Patient reports that he feels better after he had a shower.  Patient needs to be evaluate by wound care today.  :  Patient appears agitated. Nursing staff reports that the patient is very impulsive despite receiving ativan.    Consultants/Specialty  Wound care    Disposition  SNF referral.        Review of Systems   Unable to perform ROS: Psychiatric disorder      Physical Exam  Laboratory/Imaging   Hemodynamics  Temp (24hrs), Av.9 °C (98.4 °F), Min:36.1 °C (97 °F), Max:37.3 °C (99.2 °F)   Temperature: 36.1 °C (97 °F)  Pulse  Av.6  Min: 75  Max: 108    Blood Pressure: (!) 162/97      Respiratory      Respiration: 18, Pulse Oximetry: 97 %        RUL Breath Sounds: Clear, RML Breath Sounds: Clear, RLL Breath Sounds: Diminished, ROSALIA Breath Sounds: Clear, LLL Breath Sounds: Diminished    Fluids    Intake/Output Summary (Last 24 hours) at 18  Last data filed at 18 1815   Gross per 24 hour   Intake             2030 ml   Output                0 ml   Net             2030 ml       Nutrition  Orders Placed This Encounter   Procedures   • Diet Order Regular     Standing Status:   Standing     Number of Occurrences:   1     Order Specific Question:   Diet:     Answer:   Regular [1]     Physical Exam   Constitutional: No distress.   HENT:   Head: Normocephalic.   Mouth/Throat: No oropharyngeal  exudate.   Eyes: Pupils are equal, round, and reactive to light. Right eye exhibits no discharge. Left eye exhibits no discharge.   Neck: Neck supple. No JVD present.   Cardiovascular: Normal rate and regular rhythm.    No murmur heard.  Pulmonary/Chest: Breath sounds normal. No respiratory distress. He has no wheezes.   Abdominal: Soft. Bowel sounds are normal. He exhibits no distension.   Musculoskeletal: He exhibits tenderness.   Able to move all four extremities.   Neurological: He is alert.   Not oriented to time.  Lacks insight to his problems   Skin: Skin is dry. He is not diaphoretic.   Large open wound in left leg. 13 cm Nearly circumferential; Medial Aspect: 7 cm, Lateral posterior Aspect 6 cm   Psychiatric:   Agitated and confused at times.       Recent Labs      11/10/18   0323  11/11/18   1005  11/12/18   0343   WBC  6.5  4.7*  5.6   RBC  3.38*  3.55*  3.47*   HEMOGLOBIN  8.6*  9.1*  9.1*   HEMATOCRIT  27.2*  29.4*  28.5*   MCV  80.5*  82.8  82.1   MCH  25.4*  25.6*  26.2*   MCHC  31.6*  31.0*  31.9*   RDW  53.3*  54.6*  52.2*   PLATELETCT  333  332  366   MPV  8.4*  8.5*  8.9*     Recent Labs      11/10/18   0323  11/11/18   1005  11/12/18   0343   SODIUM  136  137  136   POTASSIUM  3.9  3.8  3.7   CHLORIDE  103  104  102   CO2  26  25  24   GLUCOSE  122*  113*  100*   BUN  15  7*  8   CREATININE  0.80  0.66  0.73   CALCIUM  8.3*  8.3*  8.7             Recent Labs      11/10/18   0323   TRIGLYCERIDE  49   HDL  71   LDL  36          Assessment/Plan     * Sepsis ([facility])   Assessment & Plan    This is sepsis (without associated acute organ dysfunction).   Sources left lower extremity wound  Continue on broad-spectrum antibiotics has a history of necrotizing fasciitis has been noncompliant with outpatient antibiotics  Wound care  Culture result indicates MRSA and beta Hemolytic Streptococcus group A   Switching antibiotic to linezolid.     Alcohol withdrawal hallucinosis (HCC)   Assessment & Plan    Alcohol  withdrawal protocol ordered  Multivitamin  Patient's is agitated today.  Increase librium   Continue to monitor mentation.  Patient is hallucinating that someone is going to hurt him.  Also add prn haldol for agitation.  Ammonia level is normal.     Open wound of left lower leg- (present on admission)   Assessment & Plan    Wound care recommendation reviewed.  Patient needs extensive wound care.  He is not compliant with follow up. Therefore, skilled nursing facility referral.     Microcytic anemia- (present on admission)   Assessment & Plan    At baseline with no signs of acute bleeding    monitor cbc     Severe protein-calorie malnutrition (HCC)   Assessment & Plan    Nutrition consult  Boost for the patient.     Debility   Assessment & Plan    PT/OT to eval      Lactic acidosis   Assessment & Plan    Resolved after infusion of IV fluids.  Lactic acid level normalized.     Tobacco use- (present on admission)   Assessment & Plan     smoking cessation counseling.Nicotine patch provided       Quality-Core Measures   Reviewed items::  Medications reviewed and Labs reviewed  DVT prophylaxis pharmacological::  Heparin  Antibiotics:  Treating active infection/contamination beyond 24 hours perioperative coverage

## 2018-11-13 NOTE — PROGRESS NOTES
Bedside report received.  Assessment complete.  A&O x 4 this am.   Patient up with SN assist. Bed alarm refused, safety sitter at bedside.   Patient has 0/10 pain.   Denies N&V. Tolerating regular diet.  + void, + flatus  Patient denies SOB.  SCD's refused.  Patient educated on wound schedule per order.  Review plan with of care with patient. Call light and personal belongings with in reach. Hourly rounding in place. All needs met at this time.

## 2018-11-13 NOTE — CARE PLAN
Problem: Safety  Goal: Will remain free from falls  Outcome: PROGRESSING SLOWER THAN EXPECTED  Will educate pt. On need to utilize call light for assistance and before getting up.   Bed locked and in lowest position, Non-slid slippers on, bed alarm is on.     Problem: Psychosocial Needs:  Goal: Level of anxiety will decrease  Outcome: PROGRESSING SLOWER THAN EXPECTED  Will medicate per MAR, and reorient as necessary

## 2018-11-13 NOTE — PROGRESS NOTES
Hospital Medicine Daily Progress Note    Date of Service  11/13/2018    Chief Complaint  63 y.o. male admitted 11/9/2018 with complaint of left leg wound infection    Hospital Course    *53-year-old male admitted to the hospital on November 9, 2018 with left leg wound infection.  Patient had a injury in May on his foot and he was evaluated at [facility] and he had necrotizing fasciitis that was debrided by surgery.  He has a poor compliance and he he has not been following wound care and not taking oral antibiotic as prescribed.  He has a history of chronic alcohol use.*      Interval Problem Update    Saw this patient at bedside this morning.  He denies any acute complaints.  Later I learned from the RN that he would like to leave as he has some meeting for his home.  I requested consult with psychiatry and discussed regarding his current medical conditions.    Continue p.o. Antibiotic.    Replaced potassium.    Blood pressure is running high started him on lisinopril 5 mg daily and also started IV blood pressure medication as needed with parameters.    Discussed with case management regarding his disposition.    Consultants/Specialty  Psychiatry  Wound care    Code Status  Full    Disposition  SNF    Review of Systems  Review of Systems   Constitutional: Negative for chills and fever.   HENT: Negative for hearing loss and tinnitus.    Eyes: Negative for blurred vision.   Respiratory: Negative for cough and shortness of breath.    Cardiovascular: Negative for chest pain and palpitations.   Gastrointestinal: Negative for nausea and vomiting.   Genitourinary: Negative for dysuria.   Musculoskeletal: Positive for joint pain and myalgias.   Neurological: Negative for dizziness, speech change and focal weakness.   Psychiatric/Behavioral: Negative for hallucinations.        Physical Exam  Temp:  [36.1 °C (97 °F)-37 °C (98.6 °F)] 36.4 °C (97.6 °F)  Pulse:  [77-91] 77  Resp:  [18-20] 20  BP: (150-173)/(60-97)  152/89    Physical Exam   Constitutional: He is oriented to person, place, and time. No distress.   Neck: Normal range of motion.   Cardiovascular: Normal rate and regular rhythm.    No murmur heard.  Pulmonary/Chest: Breath sounds normal. No respiratory distress. He has no wheezes.   Abdominal: Soft. He exhibits no distension. There is no tenderness.   Musculoskeletal: He exhibits tenderness.   Range of motion decreased on left lower extremity due to pain   Neurological: He is alert and oriented to person, place, and time. No cranial nerve deficit. Coordination normal.   Skin: He is not diaphoretic.   Left lower extremity has full extending from thigh to leg and covered medial to lateral aspect.           Fluids    Intake/Output Summary (Last 24 hours) at 11/13/18 1448  Last data filed at 11/13/18 1300   Gross per 24 hour   Intake              960 ml   Output                0 ml   Net              960 ml       Laboratory  Recent Labs      11/11/18   1005  11/12/18   0343  11/13/18   0229   WBC  4.7*  5.6  4.9   RBC  3.55*  3.47*  3.62*   HEMOGLOBIN  9.1*  9.1*  9.1*   HEMATOCRIT  29.4*  28.5*  30.0*   MCV  82.8  82.1  82.9   MCH  25.6*  26.2*  25.1*   MCHC  31.0*  31.9*  30.3*   RDW  54.6*  52.2*  54.2*   PLATELETCT  332  366  403   MPV  8.5*  8.9*  9.0     Recent Labs      11/11/18   1005  11/12/18   0343  11/13/18   0229   SODIUM  137  136  139   POTASSIUM  3.8  3.7  3.5*   CHLORIDE  104  102  105   CO2  25  24  25   GLUCOSE  113*  100*  100*   BUN  7*  8  9   CREATININE  0.66  0.73  0.73   CALCIUM  8.3*  8.7  8.6                   Imaging  No orders to display        Assessment/Plan  * Sepsis ([facility])   Assessment & Plan    This is sepsis (without associated acute organ dysfunction).   Source left lower extremity wound  Culture result indicates MRSA and beta Hemolytic Streptococcus group A   He is on linezolid.     Alcohol withdrawal hallucinosis (HCC)   Assessment & Plan    He has history of alcohol  abuse.  Currently he is on CIWA protocol  Multivitamin  He was alert this morning but later he was confused and would like to leave.  Requested psychiatry consult.  Continue Librium     Open wound of left lower leg- (present on admission)   Assessment & Plan    He is not compliant with medical care.  Appreciate wound care recommendations.  Plan is to transfer his care to skilled nursing facility for wound care management.       Microcytic anemia- (present on admission)   Assessment & Plan    At his baseline.  No signs of active bleeding.  Continue to monitor.     Severe protein-calorie malnutrition (HCC)   Assessment & Plan    Nutrition consult  Boost for the patient.     Debility   Assessment & Plan    PT/OT to eval      Lactic acidosis   Assessment & Plan    Resolved after infusion of IV fluids.  Lactic acid level normalized.     Tobacco use- (present on admission)   Assessment & Plan     smoking cessation counseling.Nicotine patch provided          VTE prophylaxis: Heparin

## 2018-11-13 NOTE — PROGRESS NOTES
"Attempted to changed pts dressing to LLE. Pt was frantically searching for his wallet, crying saying \"I cant get the pins out of my head.\" Pt stating he must leave to get to an appointment about housing. MD aware.   "

## 2018-11-13 NOTE — CARE PLAN
Problem: Safety  Goal: Will remain free from falls  Outcome: PROGRESSING AS EXPECTED  1:1 safety sitter ordered. Utilizing PRN medications when pt begins to become aggressive and hit himself. Administered with positive effect.     Problem: Infection  Goal: Will remain free from infection  Outcome: PROGRESSING AS EXPECTED  Abx in use. Dressing change PRN.     Problem: Psychosocial Needs:  Goal: Level of anxiety will decrease  Outcome: PROGRESSING AS EXPECTED  Providing reorientation and reassurance. Encouraging pt to voice feelings. Pt gets very emotional and aggressive towards self very quickly.

## 2018-11-13 NOTE — CARE PLAN
Problem: Safety  Goal: Will remain free from injury    Intervention: Educate patient and significant other/support system about adaptive mobility strategies and safe transfers  1:1 safety sitter at bedside       Problem: Mobility  Goal: Risk for activity intolerance will decrease  Stand by assist with pt to bathroom

## 2018-11-13 NOTE — PSYCHIATRY
BRIEF PSYCHIATRIC CONSULT NOTE: patient seen and assessed, Patient was oriented to person and date. He was not oriented to why he was in the hospital. He believes he is in the hospital for broken ribs. He did not remember that he had already received his antibiotics for the day and did not remember that he had recently been in the hospital for his leg wound.     At this time, the patient IS NOT capacitated to make the decision to leave AMA or make medical decisions. In order to hold an  incapacitated pt on a medical hold, there must ALSO be an imminent  risk of death and/or disability as well. The final decision to hold or not hold is up to the treatment team.     If there are any questions please contact Risk Management [phone].    Please reconsult if the patient's presentation changes and the question of capacity arises again.    Full note to follow.    -Legal Hold:not indicated   -NOT CAPACITATED TO MAKE THE DECISION TO LEAVE AMA AND MAKE MEDICAL DECISIONS  -If patient tries to leave or starts asking about when he can leave, gently remind him why he needs to be in the hospital and redirect towards eating, resting, and watching TV.   -Sitter:yes because of impulsivity  -Orders Placed: routine  -Plan:signing off

## 2018-11-14 ENCOUNTER — APPOINTMENT (OUTPATIENT)
Dept: RADIOLOGY | Facility: MEDICAL CENTER | Age: 63
DRG: 862 | End: 2018-11-14
Attending: INTERNAL MEDICINE
Payer: MEDICAID

## 2018-11-14 PROBLEM — R45.1 AGITATION: Status: ACTIVE | Noted: 2018-11-14

## 2018-11-14 LAB
ANION GAP SERPL CALC-SCNC: 8 MMOL/L (ref 0–11.9)
BACTERIA BLD CULT: NORMAL
BACTERIA BLD CULT: NORMAL
BUN SERPL-MCNC: 14 MG/DL (ref 8–22)
CALCIUM SERPL-MCNC: 9.1 MG/DL (ref 8.5–10.5)
CHLORIDE SERPL-SCNC: 104 MMOL/L (ref 96–112)
CO2 SERPL-SCNC: 23 MMOL/L (ref 20–33)
CREAT SERPL-MCNC: 0.95 MG/DL (ref 0.5–1.4)
ERYTHROCYTE [DISTWIDTH] IN BLOOD BY AUTOMATED COUNT: 54.8 FL (ref 35.9–50)
GLUCOSE SERPL-MCNC: 95 MG/DL (ref 65–99)
HCT VFR BLD AUTO: 33.1 % (ref 42–52)
HGB BLD-MCNC: 9.9 G/DL (ref 14–18)
MAGNESIUM SERPL-MCNC: 1.9 MG/DL (ref 1.5–2.5)
MCH RBC QN AUTO: 25.1 PG (ref 27–33)
MCHC RBC AUTO-ENTMCNC: 29.9 G/DL (ref 33.7–35.3)
MCV RBC AUTO: 84 FL (ref 81.4–97.8)
PLATELET # BLD AUTO: 477 K/UL (ref 164–446)
PMV BLD AUTO: 9 FL (ref 9–12.9)
POTASSIUM SERPL-SCNC: 4.4 MMOL/L (ref 3.6–5.5)
RBC # BLD AUTO: 3.94 M/UL (ref 4.7–6.1)
SIGNIFICANT IND 70042: NORMAL
SIGNIFICANT IND 70042: NORMAL
SITE SITE: NORMAL
SITE SITE: NORMAL
SODIUM SERPL-SCNC: 135 MMOL/L (ref 135–145)
SOURCE SOURCE: NORMAL
SOURCE SOURCE: NORMAL
WBC # BLD AUTO: 5.7 K/UL (ref 4.8–10.8)

## 2018-11-14 PROCEDURE — 99232 SBSQ HOSP IP/OBS MODERATE 35: CPT | Performed by: INTERNAL MEDICINE

## 2018-11-14 PROCEDURE — 85027 COMPLETE CBC AUTOMATED: CPT

## 2018-11-14 PROCEDURE — A9270 NON-COVERED ITEM OR SERVICE: HCPCS | Performed by: INTERNAL MEDICINE

## 2018-11-14 PROCEDURE — 700111 HCHG RX REV CODE 636 W/ 250 OVERRIDE (IP): Performed by: HOSPITALIST

## 2018-11-14 PROCEDURE — 700102 HCHG RX REV CODE 250 W/ 637 OVERRIDE(OP): Performed by: INTERNAL MEDICINE

## 2018-11-14 PROCEDURE — 700111 HCHG RX REV CODE 636 W/ 250 OVERRIDE (IP): Performed by: INTERNAL MEDICINE

## 2018-11-14 PROCEDURE — 306560 TORSO SUPPORT,POSEY RESTR LG: Performed by: HOSPITALIST

## 2018-11-14 PROCEDURE — 83735 ASSAY OF MAGNESIUM: CPT

## 2018-11-14 PROCEDURE — 99223 1ST HOSP IP/OBS HIGH 75: CPT | Performed by: INTERNAL MEDICINE

## 2018-11-14 PROCEDURE — 80048 BASIC METABOLIC PNL TOTAL CA: CPT

## 2018-11-14 PROCEDURE — 700102 HCHG RX REV CODE 250 W/ 637 OVERRIDE(OP): Performed by: HOSPITALIST

## 2018-11-14 PROCEDURE — 770022 HCHG ROOM/CARE - ICU (200)

## 2018-11-14 PROCEDURE — A9270 NON-COVERED ITEM OR SERVICE: HCPCS | Performed by: HOSPITALIST

## 2018-11-14 RX ADMIN — LINEZOLID 600 MG: 600 TABLET, FILM COATED ORAL at 04:50

## 2018-11-14 RX ADMIN — LORAZEPAM 2 MG: 2 INJECTION INTRAMUSCULAR; INTRAVENOUS at 02:48

## 2018-11-14 RX ADMIN — LORAZEPAM 2 MG: 2 INJECTION INTRAMUSCULAR; INTRAVENOUS at 06:43

## 2018-11-14 RX ADMIN — HEPARIN SODIUM 5000 UNITS: 5000 INJECTION, SOLUTION INTRAVENOUS; SUBCUTANEOUS at 15:50

## 2018-11-14 RX ADMIN — LORAZEPAM 2 MG: 2 INJECTION INTRAMUSCULAR; INTRAVENOUS at 10:43

## 2018-11-14 RX ADMIN — HALOPERIDOL LACTATE 2.5 MG: 5 INJECTION, SOLUTION INTRAMUSCULAR at 07:02

## 2018-11-14 RX ADMIN — LORAZEPAM 1.5 MG: 2 INJECTION INTRAMUSCULAR; INTRAVENOUS at 02:15

## 2018-11-14 RX ADMIN — HEPARIN SODIUM 5000 UNITS: 5000 INJECTION, SOLUTION INTRAVENOUS; SUBCUTANEOUS at 04:50

## 2018-11-14 RX ADMIN — LORAZEPAM 1 MG: 2 INJECTION INTRAMUSCULAR; INTRAVENOUS at 00:47

## 2018-11-14 RX ADMIN — LISINOPRIL 5 MG: 5 TABLET ORAL at 04:49

## 2018-11-14 RX ADMIN — STANDARDIZED SENNA CONCENTRATE AND DOCUSATE SODIUM 2 TABLET: 8.6; 5 TABLET, FILM COATED ORAL at 18:34

## 2018-11-14 RX ADMIN — THIAMINE HCL TAB 100 MG 100 MG: 100 TAB at 04:50

## 2018-11-14 RX ADMIN — LORAZEPAM 1.5 MG: 2 INJECTION INTRAMUSCULAR; INTRAVENOUS at 04:51

## 2018-11-14 RX ADMIN — NICOTINE 21 MG: 21 PATCH, EXTENDED RELEASE TRANSDERMAL at 04:52

## 2018-11-14 RX ADMIN — LINEZOLID 600 MG: 600 TABLET, FILM COATED ORAL at 18:34

## 2018-11-14 RX ADMIN — CHLORDIAZEPOXIDE HYDROCHLORIDE 25 MG: 25 CAPSULE ORAL at 04:49

## 2018-11-14 ASSESSMENT — LIFESTYLE VARIABLES
TOTAL SCORE: VERY MILD ITCHING, PINS AND NEEDLES SENSATION, BURNING OR NUMBNESS
AGITATION: *
VISUAL DISTURBANCES: NOT PRESENT
HEADACHE, FULLNESS IN HEAD: MILD
TREMOR: NO TREMOR
ORIENTATION AND CLOUDING OF SENSORIUM: DISORIENTED FOR PLACE AND / OR PERSON
HEADACHE, FULLNESS IN HEAD: NOT PRESENT
PAROXYSMAL SWEATS: BARELY PERCEPTIBLE SWEATING. PALMS MOIST
ORIENTATION AND CLOUDING OF SENSORIUM: DISORIENTED FOR PLACE AND / OR PERSON
AUDITORY DISTURBANCES: NOT PRESENT
TOTAL SCORE: 16
PAROXYSMAL SWEATS: *
TREMOR: MODERATE TREMOR WITH ARMS EXTENDED
ORIENTATION AND CLOUDING OF SENSORIUM: DISORIENTED FOR PLACE AND / OR PERSON
TOTAL SCORE: 28
ANXIETY: EQUIVALENT TO ACUTE PANIC STATES AS OCCUR IN SEVERE DELIRIUM OR ACUTE SCHIZOPHRENIC REACTIONS
PAROXYSMAL SWEATS: *
HEADACHE, FULLNESS IN HEAD: MODERATE
AUDITORY DISTURBANCES: NOT PRESENT
TOTAL SCORE: VERY MILD ITCHING, PINS AND NEEDLES SENSATION, BURNING OR NUMBNESS
HEADACHE, FULLNESS IN HEAD: NOT PRESENT
VISUAL DISTURBANCES: NOT PRESENT
ANXIETY: NO ANXIETY (AT EASE)
AUDITORY DISTURBANCES: NOT PRESENT
TREMOR: MODERATE TREMOR WITH ARMS EXTENDED
PAROXYSMAL SWEATS: BARELY PERCEPTIBLE SWEATING. PALMS MOIST
AGITATION: NORMAL ACTIVITY
NAUSEA AND VOMITING: NO NAUSEA AND NO VOMITING
ORIENTATION AND CLOUDING OF SENSORIUM: DISORIENTED FOR PLACE AND / OR PERSON
AGITATION: PACES BACK AND FORTH DURING MOST OF THE INTERVIEW OR CONSTANTLY THRASHES ABOUT.
ANXIETY: EQUIVALENT TO ACUTE PANIC STATES AS OCCUR IN SEVERE DELIRIUM OR ACUTE SCHIZOPHRENIC REACTIONS
VISUAL DISTURBANCES: NOT PRESENT
AUDITORY DISTURBANCES: NOT PRESENT
PAROXYSMAL SWEATS: *
VISUAL DISTURBANCES: NOT PRESENT
VISUAL DISTURBANCES: NOT PRESENT
AGITATION: MODERATELY FIDGETY AND RESTLESS
HEADACHE, FULLNESS IN HEAD: NOT PRESENT
TOTAL SCORE: 18
TREMOR: MODERATE TREMOR WITH ARMS EXTENDED
NAUSEA AND VOMITING: NO NAUSEA AND NO VOMITING
TOTAL SCORE: 30
VISUAL DISTURBANCES: NOT PRESENT
AUDITORY DISTURBANCES: NOT PRESENT
TREMOR: NO TREMOR
NAUSEA AND VOMITING: NO NAUSEA AND NO VOMITING
AGITATION: *
PAROXYSMAL SWEATS: *
TOTAL SCORE: 7
PAROXYSMAL SWEATS: BARELY PERCEPTIBLE SWEATING. PALMS MOIST
PAROXYSMAL SWEATS: NO SWEAT VISIBLE
TREMOR: NO TREMOR
TOTAL SCORE: VERY MILD ITCHING, PINS AND NEEDLES SENSATION, BURNING OR NUMBNESS
HEADACHE, FULLNESS IN HEAD: NOT PRESENT
TOTAL SCORE: VERY MILD ITCHING, PINS AND NEEDLES SENSATION, BURNING OR NUMBNESS
NAUSEA AND VOMITING: NO NAUSEA AND NO VOMITING
VISUAL DISTURBANCES: NOT PRESENT
AGITATION: *
NAUSEA AND VOMITING: NO NAUSEA AND NO VOMITING
NAUSEA AND VOMITING: NO NAUSEA AND NO VOMITING
TOTAL SCORE: VERY MILD ITCHING, PINS AND NEEDLES SENSATION, BURNING OR NUMBNESS
NAUSEA AND VOMITING: MILD NAUSEA WITH NO VOMITING
ANXIETY: *
TREMOR: TREMOR NOT VISIBLE BUT CAN BE FELT, FINGERTIP TO FINGERTIP
AGITATION: SOMEWHAT MORE THAN NORMAL ACTIVITY
ANXIETY: MILDLY ANXIOUS
TREMOR: MODERATE TREMOR WITH ARMS EXTENDED
AGITATION: PACES BACK AND FORTH DURING MOST OF THE INTERVIEW OR CONSTANTLY THRASHES ABOUT.
ORIENTATION AND CLOUDING OF SENSORIUM: DATE DISORIENTATION BY NO MORE THAN TWO CALENDAR DAYS
ORIENTATION AND CLOUDING OF SENSORIUM: DATE DISORIENTATION BY NO MORE THAN TWO CALENDAR DAYS
HEADACHE, FULLNESS IN HEAD: NOT PRESENT
ORIENTATION AND CLOUDING OF SENSORIUM: DISORIENTED FOR PLACE AND / OR PERSON
TREMOR: MODERATE TREMOR WITH ARMS EXTENDED
ORIENTATION AND CLOUDING OF SENSORIUM: DISORIENTED FOR PLACE AND / OR PERSON
PAROXYSMAL SWEATS: NO SWEAT VISIBLE
ANXIETY: *
ORIENTATION AND CLOUDING OF SENSORIUM: DISORIENTED FOR PLACE AND / OR PERSON
AUDITORY DISTURBANCES: VERY MILD HARSHNESS OR ABILITY TO FRIGHTEN
TOTAL SCORE: 13
ANXIETY: *
VISUAL DISTURBANCES: NOT PRESENT
ANXIETY: *
ANXIETY: *
TOTAL SCORE: 9
HEADACHE, FULLNESS IN HEAD: NOT PRESENT
AGITATION: *
AUDITORY DISTURBANCES: VERY MILD HARSHNESS OR ABILITY TO FRIGHTEN
AUDITORY DISTURBANCES: NOT PRESENT
HEADACHE, FULLNESS IN HEAD: MODERATE
VISUAL DISTURBANCES: NOT PRESENT
TOTAL SCORE: 13
AUDITORY DISTURBANCES: NOT PRESENT
TOTAL SCORE: 7

## 2018-11-14 ASSESSMENT — ENCOUNTER SYMPTOMS
NAUSEA: 0
BLURRED VISION: 0
FEVER: 0
NUMBNESS: 0
HALLUCINATIONS: 0
PALPITATIONS: 0
ABDOMINAL PAIN: 0
VOMITING: 0
CHILLS: 0
MYALGIAS: 1
SHORTNESS OF BREATH: 0
FOCAL WEAKNESS: 0
CONFUSION: 0
COUGH: 0
DIZZINESS: 0
SPEECH CHANGE: 0

## 2018-11-14 ASSESSMENT — PAIN SCALES - GENERAL
PAINLEVEL_OUTOF10: 2
PAINLEVEL_OUTOF10: 3
PAINLEVEL_OUTOF10: 0
PAINLEVEL_OUTOF10: 3
PAINLEVEL_OUTOF10: 2

## 2018-11-14 ASSESSMENT — PATIENT HEALTH QUESTIONNAIRE - PHQ9
2. FEELING DOWN, DEPRESSED, IRRITABLE, OR HOPELESS: NOT AT ALL
SUM OF ALL RESPONSES TO PHQ9 QUESTIONS 1 AND 2: 0
1. LITTLE INTEREST OR PLEASURE IN DOING THINGS: NOT AT ALL

## 2018-11-14 NOTE — PSYCHIATRY
"PSYCHOLOGICAL CONSULTATION:  Reason for admission: Sepsis (HCC)  Reason for consult: Capacity to make the decision to leave AMA  Requesting Physician: Dr. Santos    Legal status: Legal Status: Voluntary    Chief Complaint: \"I broke some ribs. Oh, and I have this wound on my leg.\"    HPI: Bola Varela is a 63 y.o. male with a psychiatric history of a severe alcohol use disorder who presented to the hospital BIB self with a chief complaint of a leg wound. He had recently been in the hospital for treatment of the wound and prescribed antibiotics. He reported that he had not been taking the antibiotics because \"they fell out of my pocket.\" He was found to be septic and admitted to the floor for treatment. The patient was also placed on a CIWA protocol due to his severe alcohol abuse. While in the hospital, the patient has been waxing and waning in his orientation and affect, at times becoming agitated and very confused. Patient also indicated a desire to leave the hospital AMA. Consult was placed to assess the patient for the capacity to make the deicoons to leave AMA.     Today, the patient was only oriented to person and place. He was not oriented to why he was in the hospital. He believes he is in the hospital for broken ribs. He did not remember that he had already received his antibiotics for the day stating multiple times that he had not been given medications even though the IV pole with his medications was next to his chair.  He also did not remember that he had recently been in the hospital for his leg wound stating that this was his first time. The patient asked this writer if he would be able to leave the hospital multiple times. This writer explained to the patient why he needed to be in the hospital and the patient agreed each time. He then asked this writer how he would be able to eat since he doesn't have any money. This writer explained that he did not need to worry about paying for food while he " "was in the hospital.      Risk Assessment: current symptoms as reported by pt.  Suicidal Thoughts: Denies  Plan to Commit Suicide: Denies  Intent to Commit Suicide: Denies  History of Suicidal Thoughts: Did not report  History of Suicide Attempts: Did not report      Homicidal Thoughts: Denies  Plan to Hurt Others: Denies  Intent to Hurt Others: Denies  History of Homicidal Thoughts or Actions: Did not report    Self-Harm Thoughts: Denies  Self-Harm Actions: Denies      Psychiatric Review of Systems:current symptoms as reported by pt.  Depression: Denies   Blanquita: Denies   Anxiety/Panic Attacks: Denies   PTSD symptom: Denies   Psychosis: Denies        Medical Review of Systems: as reported by pt. All systems reviewed. Only those found to be + are noted below. All others are negative.   Neurological:    TBIs: Did not report   SZs:Did not report   Strokes:Did not report    Other medical symptoms:   Thyroid:Did not report   Diabetes: Did not report   Cardiovascular disease: Did not report    Past Psychiatric Hx: Limited due to patient presentation. He has a long history of severe alcohol abuse and has been seen in the ED here at [facility] for alcohol intoxication.     Family Psychiatric Hx: Did not report    Social Hx: Limited due to patient presentation. Patient is currently homeless and \"camps out.\" He used to live in [place].     Drug/Alcohol/Tobacco Hx:   Drugs: Denies   Prescription Medication Abuse: Denies   Alcohol: Endorses   Tobacco: Endorses    Medical Hx: Chart reviewed. Only those findings of potential interest to psychiatry are noted below:  Past Medical History:   Diagnosis Date   • Psychiatric problem    • Restless leg    • Tobacco use      Medical Conditions:     Allergies: Patient has no known allergies.  Medications (currently prescribed at [facility]):    Current Facility-Administered Medications:   •  lisinopril (PRINIVIL) tablet 5 mg, 5 mg, Oral, Q DAY, Dayanara Santos M.D.  •  labetalol " (NORMODYNE,TRANDATE) injection 10 mg, 10 mg, Intravenous, Q6HRS PRN, Dayanara Santos M.D.  •  chlordiazePOXIDE (LIBRIUM) capsule 25 mg, 25 mg, Oral, TID, Jose Alejandro De Leon D.O., 25 mg at 11/13/18 1134  •  linezolid (ZYVOX) tablet 600 mg, 600 mg, Oral, Q12HRS, Jose Alejandro De Leon D.O., 600 mg at 11/13/18 0513  •  nicotine (NICODERM) 21 MG/24HR 21 mg, 21 mg, Transdermal, Daily-0600, 21 mg at 11/13/18 0518 **AND** Nicotine Replacement Patient Education Materials, , , Once **AND** nicotine polacrilex (NICORETTE) 2 MG piece 2 mg, 2 mg, Oral, Q HOUR PRN, YADIRA Arroyo.O.  •  haloperidol lactate (HALDOL) injection 2.5 mg, 2.5 mg, Intravenous, Q4HRS PRN, Jose Alejandro De Leon D.O., 2.5 mg at 11/12/18 1550  •  NS infusion 2,028 mL, 30 mL/kg, Intravenous, Once PRN, Debra Perkins M.D.  •  NS (BOLUS) infusion 500 mL, 500 mL, Intravenous, Once PRN, Debra Perkins M.D., Stopped at 11/13/18 1334  •  LORazepam (ATIVAN) tablet 0.5 mg, 0.5 mg, Oral, Q4HRS PRN, Debra Perkins M.D., 0.5 mg at 11/13/18 0329  •  LORazepam (ATIVAN) tablet 1 mg, 1 mg, Oral, Q4HRS PRN, 1 mg at 11/10/18 0133 **OR** LORazepam (ATIVAN) injection 0.5 mg, 0.5 mg, Intravenous, Q4HRS PRN, Debra Perkins M.D., 0.5 mg at 11/13/18 1347  •  LORazepam (ATIVAN) tablet 2 mg, 2 mg, Oral, Q2HRS PRN, 2 mg at 11/11/18 0434 **OR** LORazepam (ATIVAN) injection 1 mg, 1 mg, Intravenous, Q2HRS PRN, Debra Perkins M.D., 1 mg at 11/12/18 1324  •  LORazepam (ATIVAN) tablet 3 mg, 3 mg, Oral, Q HOUR PRN, 3 mg at 11/11/18 1442 **OR** LORazepam (ATIVAN) injection 1.5 mg, 1.5 mg, Intravenous, Q HOUR PRN, Debra Perkins M.D., 1.5 mg at 11/13/18 1238  •  LORazepam (ATIVAN) tablet 4 mg, 4 mg, Oral, Q15 MIN PRN **OR** LORazepam (ATIVAN) injection 2 mg, 2 mg, Intravenous, Q15 MIN PRN, Debra Perkins M.D.  •  thiamine tablet 100 mg, 100 mg, Oral, DAILY, 100 mg at 11/13/18 0513 **AND** [COMPLETED] multivitamin (THERAGRAN) tablet 1 Tab, 1 Tab, Oral, DAILY, 1 Tab at 11/13/18 0513 **AND**  [COMPLETED] folic acid (FOLVITE) tablet 1 mg, 1 mg, Oral, DAILY, Jose Alejandro De Leon D.O., 1 mg at 11/13/18 0513  •  senna-docusate (PERICOLACE or SENOKOT S) 8.6-50 MG per tablet 2 Tab, 2 Tab, Oral, BID, 2 Tab at 11/13/18 0513 **AND** polyethylene glycol/lytes (MIRALAX) PACKET 1 Packet, 1 Packet, Oral, QDAY PRN **AND** magnesium hydroxide (MILK OF MAGNESIA) suspension 30 mL, 30 mL, Oral, QDAY PRN **AND** bisacodyl (DULCOLAX) suppository 10 mg, 10 mg, Rectal, QDAY PRN, Debra Perkins M.D.  •  heparin injection 5,000 Units, 5,000 Units, Subcutaneous, Q8HRS, Debra Perkins M.D., 5,000 Units at 11/13/18 1336  •  ondansetron (ZOFRAN) syringe/vial injection 4 mg, 4 mg, Intravenous, Q4HRS PRN, Debra Perkins M.D.  •  ondansetron (ZOFRAN ODT) dispertab 4 mg, 4 mg, Oral, Q4HRS PRN, Debra Perkins M.D.  •  promethazine (PHENERGAN) tablet 12.5-25 mg, 12.5-25 mg, Oral, Q4HRS PRSABINE, Debra Perkins M.D.  •  promethazine (PHENERGAN) suppository 12.5-25 mg, 12.5-25 mg, Rectal, Q4HRS PRN, Debra Perkins M.D.  •  prochlorperazine (COMPAZINE) injection 5-10 mg, 5-10 mg, Intravenous, Q4HRS PRN, Debra Perkins M.D.  Labs:  Recent Results (from the past 24 hour(s))   CBC WITH DIFFERENTIAL    Collection Time: 11/13/18  2:29 AM   Result Value Ref Range    WBC 4.9 4.8 - 10.8 K/uL    RBC 3.62 (L) 4.70 - 6.10 M/uL    Hemoglobin 9.1 (L) 14.0 - 18.0 g/dL    Hematocrit 30.0 (L) 42.0 - 52.0 %    MCV 82.9 81.4 - 97.8 fL    MCH 25.1 (L) 27.0 - 33.0 pg    MCHC 30.3 (L) 33.7 - 35.3 g/dL    RDW 54.2 (H) 35.9 - 50.0 fL    Platelet Count 403 164 - 446 K/uL    MPV 9.0 9.0 - 12.9 fL    Neutrophils-Polys 45.50 44.00 - 72.00 %    Lymphocytes 33.10 22.00 - 41.00 %    Monocytes 18.20 (H) 0.00 - 13.40 %    Eosinophils 1.80 0.00 - 6.90 %    Basophils 0.60 0.00 - 1.80 %    Immature Granulocytes 0.80 0.00 - 0.90 %    Nucleated RBC 0.00 /100 WBC    Neutrophils (Absolute) 2.23 1.82 - 7.42 K/uL    Lymphs (Absolute) 1.62 1.00 - 4.80 K/uL    Monos  "(Absolute) 0.89 (H) 0.00 - 0.85 K/uL    Eos (Absolute) 0.09 0.00 - 0.51 K/uL    Baso (Absolute) 0.03 0.00 - 0.12 K/uL    Immature Granulocytes (abs) 0.04 0.00 - 0.11 K/uL    NRBC (Absolute) 0.00 K/uL   Renal Function Panel    Collection Time: 11/13/18  2:29 AM   Result Value Ref Range    Sodium 139 135 - 145 mmol/L    Potassium 3.5 (L) 3.6 - 5.5 mmol/L    Chloride 105 96 - 112 mmol/L    Co2 25 20 - 33 mmol/L    Glucose 100 (H) 65 - 99 mg/dL    Creatinine 0.73 0.50 - 1.40 mg/dL    Bun 9 8 - 22 mg/dL    Calcium 8.6 8.5 - 10.5 mg/dL    Phosphorus 3.7 2.5 - 4.5 mg/dL    Albumin 3.0 (L) 3.2 - 4.9 g/dL   ESTIMATED GFR    Collection Time: 11/13/18  2:29 AM   Result Value Ref Range    GFR If African American >60 >60 mL/min/1.73 m 2    GFR If Non African American >60 >60 mL/min/1.73 m 2          Cranial Imaging Hx: CT of the head on 8/25/18: \"NO ACUTE ABNORMALITIES ARE NOTED ON CT SCAN OF THE HEAD.    Findings are consistent with atrophy.  Decreased attenuation in the periventricular white matter likely indicates microvascular ischemic disease.\"    Psychiatric Examination:  Vitals: Blood pressure 152/89, pulse 77, temperature 36.4 °C (97.6 °F), resp. rate 20, height 1.778 m (5' 10\"), weight 67.6 kg (149 lb 0.5 oz), SpO2 98 %.  Musculoskeletal: normal psychomotor activity, no tics or unusual mannerisms noted  Appearance and Eye Contact: appropriate dress and grooming. Behavior is calm, cooperative,  appropriate eye contact  Attention/Alertness: Alert  Thought Process: Linear    Thought Content: No psychotic processes  Speech: Clear with normal rate and rhythm  Mood: \"good\"            Affect: somewhat worried         SI/HI: Denies    Memory: Recent and remote memory appear impaired    Orientation: alert, only oriented to:, person, place  Insight into symptoms: very poor  Judgement into symptoms:very poor    Neuropsychological Testing:   Not formally tested.          ASSESSMENT: Patient was oriented to person and date. He was " not oriented to why he was in the hospital. He believes he is in the hospital for broken ribs. He did not remember that he had already received his antibiotics for the day and did not remember that he had recently been in the hospital for his leg wound.      At this time, the patient IS NOT capacitated to make the decision to leave AMA or make medical decisions. In order to hold an  incapacitated pt on a medical hold, there must ALSO be an imminent  risk of death and/or disability as well. The final decision to hold or not hold is up to the treatment team.      If there are any questions please contact Risk Management [phone].     Please reconsult if the patient's presentation changes and the question of capacity arises again.     Full note to follow.    DSM5 Diagnostic Considerations:   Unspecified Neurocognitive Disorder        PLAN:  -Legal Hold:not indicated   -NOT CAPACITATED TO MAKE THE DECISION TO LEAVE AMA AND MAKE MEDICAL DECISIONS  -If patient tries to leave or starts asking about when he can leave, gently remind him why he needs to be in the hospital and redirect towards eating, resting, and watching TV.   -Sitter:yes because of impulsivity  -Orders Placed: routine  -Plan:signing off   Thank you for the consult.    Deyanira Peña PhD

## 2018-11-14 NOTE — CARE PLAN
Problem: Bowel/Gastric:  Goal: Will not experience complications related to bowel motility  Outcome: PROGRESSING AS EXPECTED  Pt having normal bowel function at this time, last BM was this evening.     Problem: Respiratory:  Goal: Respiratory status will improve  Outcome: MET Date Met: 11/13/18  Pt not having any breathing difficulty. Has been on RA and O2 sats appropriate.

## 2018-11-14 NOTE — PROGRESS NOTES
1:1 sitter in place for safety. Pt finished coffee. Sitting in bed asking how long he has to stay the time and can he leave. Explained to pt. He is in the hospital for his leg. Pt. Would say oh ok, and ask the same question right after.

## 2018-11-14 NOTE — PROGRESS NOTES
Bedside report received.  Assessment complete.  A&O x 3.  Patient up with SN assist. Bed alarm refused, safety sitter at bedside.   Patient has 10/10 pain. Obtained order for [place] from hospitalist.  Denies N&V. Tolerating regular diet.  + void, + flatus  Patient denies SOB.  SCD's refused.  Dressing on LLE CDI.   Review plan with of care with patient. Call light and personal belongings with in reach. Hourly rounding in place. All needs met at this time.

## 2018-11-14 NOTE — DISCHARGE PLANNING
Anticipated Discharge Disposition: TBD    Action: Received SNF order. Pt has address at homeless shelter [address] LSw noted ordering MD is Dr Mike De Leon.    LSw spoke to bedside RN. Ok for LSw to come back later as pt states he does live at homeless shelter but knows needs SNF.    LSw is unsure if he will be accepted at any facility as he would be considered an unsafe d/c by them. LSw will acquire his choice tomorrow once he is less lethargic and more A/O.    Another option, if pt is declined by SNF is possible admit for 2 weeks to [facility] w/  PT/OT. If this is medically appropriate.     Barriers to Discharge: acceptance, open bed, skilled need     Plan: f/u w/ medical team, CCA, pt

## 2018-11-14 NOTE — NON-PROVIDER
"PSYCHOLOGICAL CONSULTATION:  Reason for admission: Sepsis (HCC)  Reason for consult: Capacity to leave AMA  Requesting Physician: Dr. Santos  Supervising Physician: Chichi Platt MD      Legal status: {Legal Status:08152:::1}    Chief Complaint: leg wound    HPI: Bola Varela is a 63 y.o. male with a history of chronic alcohol use disorder who presented to the hospital on November 9, 2018 with a non-healing left leg wound. Patient states he sustained wound in May 2018, s/p wound debridement at [facility] and recent hospitalization at [facility] for similar issues, discharged on 10/26/2018. Mr. Varela has a long history of non-compliance with wound care and antibiotic regimen.     We were called to consult on the patient because he expressed a desire to leave the hospital AMA before his medical treatment was completed. When the patient is asked why he is in the hospital, he states that it is his \"rib pain\" as well as a \"laceration on his leg\".         Risk Assessment: current symptoms as reported by pt.  Suicidal Thoughts: Denies  Plan to Commit Suicide: Denies  Intent to Commit Suicide: Denies  History of Suicidal Thoughts: unkonwn  History of Suicide Attempts: unknown  Risk Factors: none  Protective Factors:     Homicidal Thoughts: denies  Plan to Hurt Others: denies  Intent to Hurt Others: denies  History of Homicidal Thoughts or Actions: unknown  Risk Factors: history of incarceration  Protective Factors: unknown    Self-Harm Thoughts: denies  Self-Harm Actions: denies    Access to Guns: {YES/NO psych:85676}  Are Guns Locked Up?: {YES/NO psych:79823}      Psychiatric Review of Systems:current symptoms as reported by pt.  Depression: Denies  Blanquita: unknown  Anxiety/Panic Attacks: reports anxiety around his hospital stay  PTSD symptom: unknown  Psychosis: denies at this time  Other: none    Medical Review of Systems: as reported by pt. All systems reviewed. Only those found to be + are noted below. " "All others are negative.   Neurological:    TBIs: {Endorses/Denies:20873}   SZs:{Endorses/Denies:20873}   Strokes:{Endorses/Denies:20873}   Other: ***  Other medical symptoms:   Thyroid:{Endorses/Denies:20873}   Diabetes: {Endorses/Denies:20873}   Cardiovascular disease: {Endorses/Denies:20873}    Past Psychiatric Hx: patient denies past psychiatric history    Family Psychiatric Hx: unknown    Social Hx: Patient states he \"camps out\". Does not have income at this time, he states that his social security was not renewed for unknown reasons.     Drug/Alcohol/Tobacco Hx:   Drugs: occasional marijuana use   Prescription Medication Abuse: denies   Alcohol: long history of alcohol use with treatments including AA and rehabilitation   Tobacco: endorses    Medical Hx: Chart reviewed. Only those findings of potential interest to psychiatry are noted below:  Past Medical History:   Diagnosis Date   • Psychiatric problem    • Restless leg    • Tobacco use      Medical Conditions:     Allergies: Patient has no known allergies.  Medications (currently prescribed at [facility]):    Current Facility-Administered Medications:   •  lisinopril (PRINIVIL) tablet 5 mg, 5 mg, Oral, Q DAY, Dayanara Santos M.D.  •  labetalol (NORMODYNE,TRANDATE) injection 10 mg, 10 mg, Intravenous, Q6HRS PRN, Dayanara Santos M.D.  •  chlordiazePOXIDE (LIBRIUM) capsule 25 mg, 25 mg, Oral, TID, Jose Alejandro De Leon D.O., 25 mg at 11/13/18 1134  •  linezolid (ZYVOX) tablet 600 mg, 600 mg, Oral, Q12HRS, Jos eAlejandro De Leon D.O., 600 mg at 11/13/18 0513  •  nicotine (NICODERM) 21 MG/24HR 21 mg, 21 mg, Transdermal, Daily-0600, 21 mg at 11/13/18 0518 **AND** Nicotine Replacement Patient Education Materials, , , Once **AND** nicotine polacrilex (NICORETTE) 2 MG piece 2 mg, 2 mg, Oral, Q HOUR PRN, Jose Alejandro De Leon D.O.  •  haloperidol lactate (HALDOL) injection 2.5 mg, 2.5 mg, Intravenous, Q4HRS PRN, Jose Alejandro De Leon D.O., 2.5 mg at 11/12/18 1078  •  NS infusion 2,028 mL, 30 " mL/kg, Intravenous, Once PRN, Debra Perkins M.D.  •  NS (BOLUS) infusion 500 mL, 500 mL, Intravenous, Once PRN, Debra Perkins M.D., Stopped at 11/13/18 1334  •  LORazepam (ATIVAN) tablet 0.5 mg, 0.5 mg, Oral, Q4HRS PRN, Debra Perkins M.D., 0.5 mg at 11/13/18 0329  •  LORazepam (ATIVAN) tablet 1 mg, 1 mg, Oral, Q4HRS PRN, 1 mg at 11/10/18 0133 **OR** LORazepam (ATIVAN) injection 0.5 mg, 0.5 mg, Intravenous, Q4HRS PRN, Debra Perkins M.D., 0.5 mg at 11/13/18 1347  •  LORazepam (ATIVAN) tablet 2 mg, 2 mg, Oral, Q2HRS PRN, 2 mg at 11/11/18 0434 **OR** LORazepam (ATIVAN) injection 1 mg, 1 mg, Intravenous, Q2HRS PRN, Debra Perkins M.D., 1 mg at 11/12/18 1324  •  LORazepam (ATIVAN) tablet 3 mg, 3 mg, Oral, Q HOUR PRN, 3 mg at 11/11/18 1442 **OR** LORazepam (ATIVAN) injection 1.5 mg, 1.5 mg, Intravenous, Q HOUR PRN, Debra Perkins M.D., 1.5 mg at 11/13/18 1238  •  LORazepam (ATIVAN) tablet 4 mg, 4 mg, Oral, Q15 MIN PRN **OR** LORazepam (ATIVAN) injection 2 mg, 2 mg, Intravenous, Q15 MIN PRN, Debra Perkins M.D.  •  thiamine tablet 100 mg, 100 mg, Oral, DAILY, 100 mg at 11/13/18 0513 **AND** [COMPLETED] multivitamin (THERAGRAN) tablet 1 Tab, 1 Tab, Oral, DAILY, 1 Tab at 11/13/18 0513 **AND** [COMPLETED] folic acid (FOLVITE) tablet 1 mg, 1 mg, Oral, DAILY, Jose Alejandro De Leon D.O., 1 mg at 11/13/18 0513  •  senna-docusate (PERICOLACE or SENOKOT S) 8.6-50 MG per tablet 2 Tab, 2 Tab, Oral, BID, 2 Tab at 11/13/18 0513 **AND** polyethylene glycol/lytes (MIRALAX) PACKET 1 Packet, 1 Packet, Oral, QDAY PRN **AND** magnesium hydroxide (MILK OF MAGNESIA) suspension 30 mL, 30 mL, Oral, QDAY PRN **AND** bisacodyl (DULCOLAX) suppository 10 mg, 10 mg, Rectal, QDAY PRN, Debra Perkins M.D.  •  heparin injection 5,000 Units, 5,000 Units, Subcutaneous, Q8HRS, Debra Perkins M.D., 5,000 Units at 11/13/18 1336  •  ondansetron (ZOFRAN) syringe/vial injection 4 mg, 4 mg, Intravenous, Q4HRS PRN, Debra Perkins M.D.  •   ondansetron (ZOFRAN ODT) dispertab 4 mg, 4 mg, Oral, Q4HRS PRN, Debra Perkins M.D.  •  promethazine (PHENERGAN) tablet 12.5-25 mg, 12.5-25 mg, Oral, Q4HRS PRN, Debra Perkins M.D.  •  promethazine (PHENERGAN) suppository 12.5-25 mg, 12.5-25 mg, Rectal, Q4HRS PRN, Debra Perkins M.D.  •  prochlorperazine (COMPAZINE) injection 5-10 mg, 5-10 mg, Intravenous, Q4HRS PRN, Debra Perkins M.D.  Labs:  Recent Results (from the past 24 hour(s))   CBC WITH DIFFERENTIAL    Collection Time: 11/13/18  2:29 AM   Result Value Ref Range    WBC 4.9 4.8 - 10.8 K/uL    RBC 3.62 (L) 4.70 - 6.10 M/uL    Hemoglobin 9.1 (L) 14.0 - 18.0 g/dL    Hematocrit 30.0 (L) 42.0 - 52.0 %    MCV 82.9 81.4 - 97.8 fL    MCH 25.1 (L) 27.0 - 33.0 pg    MCHC 30.3 (L) 33.7 - 35.3 g/dL    RDW 54.2 (H) 35.9 - 50.0 fL    Platelet Count 403 164 - 446 K/uL    MPV 9.0 9.0 - 12.9 fL    Neutrophils-Polys 45.50 44.00 - 72.00 %    Lymphocytes 33.10 22.00 - 41.00 %    Monocytes 18.20 (H) 0.00 - 13.40 %    Eosinophils 1.80 0.00 - 6.90 %    Basophils 0.60 0.00 - 1.80 %    Immature Granulocytes 0.80 0.00 - 0.90 %    Nucleated RBC 0.00 /100 WBC    Neutrophils (Absolute) 2.23 1.82 - 7.42 K/uL    Lymphs (Absolute) 1.62 1.00 - 4.80 K/uL    Monos (Absolute) 0.89 (H) 0.00 - 0.85 K/uL    Eos (Absolute) 0.09 0.00 - 0.51 K/uL    Baso (Absolute) 0.03 0.00 - 0.12 K/uL    Immature Granulocytes (abs) 0.04 0.00 - 0.11 K/uL    NRBC (Absolute) 0.00 K/uL   Renal Function Panel    Collection Time: 11/13/18  2:29 AM   Result Value Ref Range    Sodium 139 135 - 145 mmol/L    Potassium 3.5 (L) 3.6 - 5.5 mmol/L    Chloride 105 96 - 112 mmol/L    Co2 25 20 - 33 mmol/L    Glucose 100 (H) 65 - 99 mg/dL    Creatinine 0.73 0.50 - 1.40 mg/dL    Bun 9 8 - 22 mg/dL    Calcium 8.6 8.5 - 10.5 mg/dL    Phosphorus 3.7 2.5 - 4.5 mg/dL    Albumin 3.0 (L) 3.2 - 4.9 g/dL   ESTIMATED GFR    Collection Time: 11/13/18  2:29 AM   Result Value Ref Range    GFR If African American >60 >60 mL/min/1.73  "m 2    GFR If Non African American >60 >60 mL/min/1.73 m 2          Cranial Imaging Hx: ***  Other:    Psychiatric Examination:  Vitals: Blood pressure 152/89, pulse 77, temperature 36.4 °C (97.6 °F), resp. rate 20, height 1.778 m (5' 10\"), weight 67.6 kg (149 lb 0.5 oz), SpO2 98 %.  Musculoskeletal: normal psychomotor activity, no tics or unusual mannerisms noted  Appearance and Eye Contact: Poor dental hygiene. Appears older than stated age. Poor grooming, but wearing new clothing. Behavior is calm, cooperative,  appropriate eye contact  Attention/Alertness: alert  Thought Process: circumstantial, and at times tangential   Thought Content: No clear delusions, but he is unable to understand why he can't come and go from the hospital as he pleases.   Speech: normal prosody, normal fluency  Mood: \"I feel ok\"            Affect: mood congruent     SI/HI: Denies SI/HI    Memory: Recent memory not intact, remote memory intact.   Orientation: At times is fully alert and oriented, at times he is alert and oriented x1  Insight into symptoms: poor  Judgement into symptoms: poor    Neuropsychological Testing:   {Neuropsychological Testing?:47381}          ASSESSMENT:     DSM5 Diagnostic Considerations: severe chronic alcohol use disorder, acute alcohol withdrawal, alcoholic dementia, dementia due to medical illness, impulse control disorder    63 year old male presents with chronic leg wound and poor decision making capacity. Patient does not have insight into why he is in the hospital, stating at times that it is his ribs and at other time that it is a leg laceration. He is unable to understand that he has a wound infection. Patient is able to state that his leg does need wound care, and that it simply must be kept clean in order to heal. On review of his medical history, it is clear that he has been unable to care for his wound on his own given his multiple admissions and medication non compliance. Patient does not appear " to understand that his wound requires dressing changes, antibiotics, and close nursing support.  However, he does understand that if he does not take care of his leg that he is at risk for permanent death and disability. But, due to his mental state, he is unable to understand a formal plan to get his leg to heal.     During my first evaluation with the patient, he was alert and oriented x4, but had a difficult time expressing good reasoning for his decision making. For example, he believes that he can ask a doctor for a note to leave the hospital so he can go to the river and chat with his friends. After it was explained to him several times that this was not an option, he still believed it to be a possibility. While there is likely an element of encephalopathy from alcohol withdrawal and possibly from infection, it is possible there is an underlying element of dementia. Patient was not able to remember this examiner after leaving for an hour and coming back for a second interview.     Therefore, we believe that the patient does not have capacity to leave the hospital at this time, and will require re-evaluation after his wound improves and he is off of CIWA protocol.     PLAN:  Legal status: Patient does not have capacity to make medical decisions at this time  Anticipate F/U within 48 hours.   Labs/tests ordered: none  Phone calls: ***  Records reviewed: ***  Discussed patient with other provider: ***  {Follow?:48031}  Thank you for the consult.    Kailee Samuel PhD

## 2018-11-15 ENCOUNTER — APPOINTMENT (OUTPATIENT)
Dept: RADIOLOGY | Facility: MEDICAL CENTER | Age: 63
DRG: 862 | End: 2018-11-15
Attending: INTERNAL MEDICINE
Payer: MEDICAID

## 2018-11-15 PROBLEM — R41.0 DELIRIUM: Status: ACTIVE | Noted: 2018-11-09

## 2018-11-15 LAB
ANION GAP SERPL CALC-SCNC: 11 MMOL/L (ref 0–11.9)
BUN SERPL-MCNC: 14 MG/DL (ref 8–22)
CALCIUM SERPL-MCNC: 9.2 MG/DL (ref 8.5–10.5)
CHLORIDE SERPL-SCNC: 105 MMOL/L (ref 96–112)
CO2 SERPL-SCNC: 22 MMOL/L (ref 20–33)
CREAT SERPL-MCNC: 0.84 MG/DL (ref 0.5–1.4)
ERYTHROCYTE [DISTWIDTH] IN BLOOD BY AUTOMATED COUNT: 56.4 FL (ref 35.9–50)
GLUCOSE SERPL-MCNC: 100 MG/DL (ref 65–99)
HCT VFR BLD AUTO: 33.3 % (ref 42–52)
HGB BLD-MCNC: 10 G/DL (ref 14–18)
MCH RBC QN AUTO: 25.6 PG (ref 27–33)
MCHC RBC AUTO-ENTMCNC: 30 G/DL (ref 33.7–35.3)
MCV RBC AUTO: 85.2 FL (ref 81.4–97.8)
PLATELET # BLD AUTO: 523 K/UL (ref 164–446)
PMV BLD AUTO: 9.3 FL (ref 9–12.9)
POTASSIUM SERPL-SCNC: 3.9 MMOL/L (ref 3.6–5.5)
PROCALCITONIN SERPL-MCNC: <0.05 NG/ML
RBC # BLD AUTO: 3.91 M/UL (ref 4.7–6.1)
SODIUM SERPL-SCNC: 138 MMOL/L (ref 135–145)
WBC # BLD AUTO: 5.3 K/UL (ref 4.8–10.8)

## 2018-11-15 PROCEDURE — 84145 PROCALCITONIN (PCT): CPT

## 2018-11-15 PROCEDURE — 85027 COMPLETE CBC AUTOMATED: CPT

## 2018-11-15 PROCEDURE — A9270 NON-COVERED ITEM OR SERVICE: HCPCS | Performed by: INTERNAL MEDICINE

## 2018-11-15 PROCEDURE — A9270 NON-COVERED ITEM OR SERVICE: HCPCS | Performed by: FAMILY MEDICINE

## 2018-11-15 PROCEDURE — 99232 SBSQ HOSP IP/OBS MODERATE 35: CPT | Performed by: HOSPITALIST

## 2018-11-15 PROCEDURE — 70450 CT HEAD/BRAIN W/O DYE: CPT

## 2018-11-15 PROCEDURE — 80048 BASIC METABOLIC PNL TOTAL CA: CPT

## 2018-11-15 PROCEDURE — 700102 HCHG RX REV CODE 250 W/ 637 OVERRIDE(OP): Performed by: INTERNAL MEDICINE

## 2018-11-15 PROCEDURE — A9270 NON-COVERED ITEM OR SERVICE: HCPCS | Performed by: HOSPITALIST

## 2018-11-15 PROCEDURE — 700102 HCHG RX REV CODE 250 W/ 637 OVERRIDE(OP): Performed by: HOSPITALIST

## 2018-11-15 PROCEDURE — 99253 IP/OBS CNSLTJ NEW/EST LOW 45: CPT | Performed by: INTERNAL MEDICINE

## 2018-11-15 PROCEDURE — 99233 SBSQ HOSP IP/OBS HIGH 50: CPT | Performed by: INTERNAL MEDICINE

## 2018-11-15 PROCEDURE — 700111 HCHG RX REV CODE 636 W/ 250 OVERRIDE (IP): Performed by: HOSPITALIST

## 2018-11-15 PROCEDURE — 700102 HCHG RX REV CODE 250 W/ 637 OVERRIDE(OP): Performed by: FAMILY MEDICINE

## 2018-11-15 PROCEDURE — 770001 HCHG ROOM/CARE - MED/SURG/GYN PRIV*

## 2018-11-15 RX ORDER — ACETAMINOPHEN 325 MG/1
650 TABLET ORAL EVERY 8 HOURS
Status: DISCONTINUED | OUTPATIENT
Start: 2018-11-15 | End: 2018-11-17 | Stop reason: HOSPADM

## 2018-11-15 RX ORDER — SULFAMETHOXAZOLE AND TRIMETHOPRIM 800; 160 MG/1; MG/1
1 TABLET ORAL EVERY 12 HOURS
Status: DISCONTINUED | OUTPATIENT
Start: 2018-11-15 | End: 2018-11-17 | Stop reason: HOSPADM

## 2018-11-15 RX ADMIN — HEPARIN SODIUM 5000 UNITS: 5000 INJECTION, SOLUTION INTRAVENOUS; SUBCUTANEOUS at 13:06

## 2018-11-15 RX ADMIN — LINEZOLID 600 MG: 600 TABLET, FILM COATED ORAL at 05:16

## 2018-11-15 RX ADMIN — HEPARIN SODIUM 5000 UNITS: 5000 INJECTION, SOLUTION INTRAVENOUS; SUBCUTANEOUS at 05:16

## 2018-11-15 RX ADMIN — SULFAMETHOXAZOLE AND TRIMETHOPRIM 1 TABLET: 800; 160 TABLET ORAL at 18:12

## 2018-11-15 RX ADMIN — HEPARIN SODIUM 5000 UNITS: 5000 INJECTION, SOLUTION INTRAVENOUS; SUBCUTANEOUS at 22:14

## 2018-11-15 RX ADMIN — ACETAMINOPHEN 650 MG: 325 TABLET, FILM COATED ORAL at 22:14

## 2018-11-15 RX ADMIN — HYDROCODONE BITARTRATE AND ACETAMINOPHEN 1 TABLET: 5; 325 TABLET ORAL at 07:55

## 2018-11-15 RX ADMIN — HYDROCODONE BITARTRATE AND ACETAMINOPHEN 1 TABLET: 5; 325 TABLET ORAL at 23:04

## 2018-11-15 RX ADMIN — LINEZOLID 600 MG: 600 TABLET, FILM COATED ORAL at 17:04

## 2018-11-15 RX ADMIN — THIAMINE HCL TAB 100 MG 100 MG: 100 TAB at 05:16

## 2018-11-15 RX ADMIN — HYDROCODONE BITARTRATE AND ACETAMINOPHEN 1 TABLET: 5; 325 TABLET ORAL at 18:12

## 2018-11-15 RX ADMIN — STANDARDIZED SENNA CONCENTRATE AND DOCUSATE SODIUM 2 TABLET: 8.6; 5 TABLET, FILM COATED ORAL at 17:04

## 2018-11-15 RX ADMIN — HYDROCODONE BITARTRATE AND ACETAMINOPHEN 1 TABLET: 5; 325 TABLET ORAL at 02:02

## 2018-11-15 RX ADMIN — NICOTINE 21 MG: 21 PATCH, EXTENDED RELEASE TRANSDERMAL at 05:16

## 2018-11-15 RX ADMIN — STANDARDIZED SENNA CONCENTRATE AND DOCUSATE SODIUM 2 TABLET: 8.6; 5 TABLET, FILM COATED ORAL at 05:16

## 2018-11-15 RX ADMIN — ACETAMINOPHEN 650 MG: 325 TABLET, FILM COATED ORAL at 11:18

## 2018-11-15 ASSESSMENT — PAIN SCALES - GENERAL
PAINLEVEL_OUTOF10: 4
PAINLEVEL_OUTOF10: 2
PAINLEVEL_OUTOF10: 1
PAINLEVEL_OUTOF10: 3
PAINLEVEL_OUTOF10: 5
PAINLEVEL_OUTOF10: 10
PAINLEVEL_OUTOF10: 3
PAINLEVEL_OUTOF10: 5
PAINLEVEL_OUTOF10: 2
PAINLEVEL_OUTOF10: 5
PAINLEVEL_OUTOF10: 4
PAINLEVEL_OUTOF10: 3
PAINLEVEL_OUTOF10: 2

## 2018-11-15 ASSESSMENT — ENCOUNTER SYMPTOMS
PALPITATIONS: 0
NERVOUS/ANXIOUS: 0
NERVOUS/ANXIOUS: 1
DEPRESSION: 0
VOMITING: 0
ABDOMINAL PAIN: 0
BACK PAIN: 1
NAUSEA: 0
FEVER: 0
CHILLS: 0
ORTHOPNEA: 0

## 2018-11-15 ASSESSMENT — LIFESTYLE VARIABLES
PAROXYSMAL SWEATS: NO SWEAT VISIBLE
HEADACHE, FULLNESS IN HEAD: NOT PRESENT
TREMOR: NO TREMOR
AGITATION: SOMEWHAT MORE THAN NORMAL ACTIVITY
TOTAL SCORE: 2
ORIENTATION AND CLOUDING OF SENSORIUM: ORIENTED AND CAN DO SERIAL ADDITIONS
AUDITORY DISTURBANCES: NOT PRESENT
ANXIETY: MILDLY ANXIOUS
NAUSEA AND VOMITING: NO NAUSEA AND NO VOMITING
VISUAL DISTURBANCES: NOT PRESENT

## 2018-11-15 NOTE — PROGRESS NOTES
Patient asleep prior to in room. Bed alarm activated. RN's to bedside find patient out of bed and confused, anxious and restless. Patient reoriented and taken for a walk. Patient calmed down after walk and was placed back into bed with bed alarm on and dinner

## 2018-11-15 NOTE — PROGRESS NOTES
Critical Care Progress Note    Date of admission  11/9/2018    Chief Complaint  63 y.o. male admitted 11/9/2018 with leg infection    Hospital Course  63 y.o. male who presented 11/9/2018 with left leg infection. He was seen at [facility] in May and had necrotizing fasciitis and debrided by surgery at that time. He has a history of alcohol abuse per records here and was on CIWA protocol and concerns for escalating agitation requiring ICU level care this morning. Patient is oriented only to person not to time. He is angry but not hallucinating, no tremor. He was able to get a walk around. Patient is demanding to go home and doesn't understand why he is in the hospital. The rest of the history is limited and unable to be obtained secondary to mental status.     Further history provided 11/15 patient rarely drinks alcohol last week was weeks ago any and drinks 1-2 beers at most no prior history of alcohol withdrawal.     Interval Problem Update  Reviewed last 24 hour events:  CT HEAD pending  AO3  Restless and anxious at times  Pain to left rib cage and leg  Impulsive snr and tach 110's  PVC  SBP 's  Afebrile  Last BM this am  Urine good  Peripheral IV  No adams  vte prophylaxis    Review of Systems  Review of Systems   Constitutional: Negative for chills and fever.   Cardiovascular: Positive for chest pain.   Gastrointestinal: Negative for abdominal pain, nausea and vomiting.   Musculoskeletal: Positive for back pain and joint pain.   Psychiatric/Behavioral: Negative for depression. The patient is not nervous/anxious.    All other systems reviewed and are negative.       Vital Signs for last 24 hours   Temp:  [36.4 °C (97.5 °F)-36.9 °C (98.5 °F)] 36.5 °C (97.7 °F)  Pulse:  [] 85  Resp:  [13-23] 18    Hemodynamic parameters for last 24 hours       Respiratory       Physical Exam   Physical Exam    Medications  Current Facility-Administered Medications   Medication Dose Route Frequency Provider Last Rate Last  Dose   • acetaminophen (TYLENOL) tablet 650 mg  650 mg Oral Q8HRS Marcio Stark M.D.   650 mg at 11/15/18 1118   • sulfamethoxazole-trimethoprim (BACTRIM DS) 800-160 MG tablet 1 Tab  1 Tab Oral Q12HRS So Vieyra M.D.       • lisinopril (PRINIVIL) tablet 5 mg  5 mg Oral Q DAY Dayanara Santos M.D.   Stopped at 11/15/18 0600   • labetalol (NORMODYNE,TRANDATE) injection 10 mg  10 mg Intravenous Q6HRS PRN Dayanara Santos M.D.       • HYDROcodone-acetaminophen (NORCO) 5-325 MG per tablet 1 Tab  1 Tab Oral Q4HRS PRN Vance Jimenes D.O.   1 Tab at 11/15/18 0755   • nicotine (NICODERM) 21 MG/24HR 21 mg  21 mg Transdermal Daily-0600 YADIRA Arroyo.O.   21 mg at 11/15/18 0516    And   • nicotine polacrilex (NICORETTE) 2 MG piece 2 mg  2 mg Oral Q HOUR PRN YADIRA Arroyo.O.       • haloperidol lactate (HALDOL) injection 2.5 mg  2.5 mg Intravenous Q4HRS PRN Jose Alejandro De Leon D.O.   2.5 mg at 11/14/18 0702   • NS infusion 2,028 mL  30 mL/kg Intravenous Once PRN Debra Perkins M.D.       • NS (BOLUS) infusion 500 mL  500 mL Intravenous Once PRN Debra Perkins M.D.   Stopped at 11/13/18 1334   • thiamine tablet 100 mg  100 mg Oral DAILY YADIRA Arroyo.O.   100 mg at 11/15/18 0516   • senna-docusate (PERICOLACE or SENOKOT S) 8.6-50 MG per tablet 2 Tab  2 Tab Oral BID Debra Perkins M.D.   2 Tab at 11/15/18 1704    And   • polyethylene glycol/lytes (MIRALAX) PACKET 1 Packet  1 Packet Oral QDAY PRN Debra Perkins M.D.        And   • magnesium hydroxide (MILK OF MAGNESIA) suspension 30 mL  30 mL Oral QDAY PRN Debra Perkins M.D.        And   • bisacodyl (DULCOLAX) suppository 10 mg  10 mg Rectal QDAY PRN Debra Perkins M.D.       • heparin injection 5,000 Units  5,000 Units Subcutaneous Q8HRS Debra Perkins M.D.   5,000 Units at 11/15/18 1306   • ondansetron (ZOFRAN) syringe/vial injection 4 mg  4 mg Intravenous Q4HRS PRN Debra Perkins M.D.       • ondansetron (ZOFRAN ODT) dispertab 4 mg  4  mg Oral Q4HRS ELIU Perkins M.D.       • promethazine (PHENERGAN) tablet 12.5-25 mg  12.5-25 mg Oral Q4HRS ELIU Perkins M.D.       • promethazine (PHENERGAN) suppository 12.5-25 mg  12.5-25 mg Rectal Q4HRS ELIU Perkins M.D.       • prochlorperazine (COMPAZINE) injection 5-10 mg  5-10 mg Intravenous Q4HRS PRSABINE Perkins M.D.           Fluids    Intake/Output Summary (Last 24 hours) at 11/15/18 1747  Last data filed at 11/15/18 1600   Gross per 24 hour   Intake             1800 ml   Output             1550 ml   Net              250 ml       Laboratory          Recent Labs      11/13/18   0229  11/14/18   0455  11/15/18   0530   SODIUM  139  135  138   POTASSIUM  3.5*  4.4  3.9   CHLORIDE  105  104  105   CO2  25  23  22   BUN  9  14  14   CREATININE  0.73  0.95  0.84   MAGNESIUM   --   1.9   --    PHOSPHORUS  3.7   --    --    CALCIUM  8.6  9.1  9.2     Recent Labs      11/13/18   0229  11/14/18   0455  11/15/18   0530   GLUCOSE  100*  95  100*     Recent Labs      11/13/18   0229  11/14/18   0455  11/15/18   0530   WBC  4.9  5.7  5.3   NEUTSPOLYS  45.50   --    --    LYMPHOCYTES  33.10   --    --    MONOCYTES  18.20*   --    --    EOSINOPHILS  1.80   --    --    BASOPHILS  0.60   --    --      Recent Labs      11/13/18   0229  11/14/18   0455  11/15/18   0530   RBC  3.62*  3.94*  3.91*   HEMOGLOBIN  9.1*  9.9*  10.0*   HEMATOCRIT  30.0*  33.1*  33.3*   PLATELETCT  403  477*  523*       Imaging  CT:    1.  No CT evidence of acute infarct, hemorrhage or mass.    Assessment/Plan  Delirium   Assessment & Plan    Patient has a reported history of alcohol has no stigmata of alcoholic liver disease hypoalbuminemia thrombocytopenia and clinical presentation does not appear to be alcohol withdrawal in nature no tachycardia hypertension tremor or tremulous and no hallucinations.  On further questioning 11/15 patient has rare alcohol use no history of alcohol withdrawal.  Patient has been  monitor off alcohol withdrawal protocol and likely was precipitated by worsening benzo use causing delirium in the patient and not withdrawal.    Recommend agitated delirium precautions will DC all Librium and Ativan.  Recommend sleep hygiene  PT ambulate minimize restraints  Frequent reorientation  Minimize medications that cause somnolence or affect mental status    Please note patient did not have alcohol withdrawal do not treat him as such     Open wound of left lower leg- (present on admission)   Assessment & Plan    Benign in appearance.   Patient has no white count fever or signs of inflammatory response  Continue Zyvox orally for group A strep coverage and MRSA    Will discuss with ID however I feel like we can stop antibiotic coverage wound culture was basically a skin culture no signs to suggest acute infection.     Hypokalemia- (present on admission)   Assessment & Plan    Continue to replace and monitor     Microcytic anemia- (present on admission)   Assessment & Plan    Continue to monitor no signs of bleeding     Agitation   Assessment & Plan    Don't feel findings are consistent with EtOH withdrawal.   History and exam with staples in head  No focal exam will get Ct head non contrast rule out acute pathology.      Essential hypertension   Assessment & Plan    Continue lisinopril and monitor     Lactic acidosis   Assessment & Plan    Resolved continue to monitor     Tobacco use- (present on admission)   Assessment & Plan    Continue to recommend cessation and use nicotine patch           VTE:  Not Indicated  Ulcer: Not Indicated  Lines: None    I have performed a physical exam and reviewed and updated ROS and Plan today (11/15/2018). In review of yesterday's note (11/14/2018), there are no changes except as documented above.     Discussed patient condition and risk of morbidity and/or mortality with Hospitalist, RN, RT, Charge nurse / hot rounds and Patient     Marcio Stark MD  Critical Care  Medicine

## 2018-11-15 NOTE — ASSESSMENT & PLAN NOTE
Patient has a reported history of alcohol has no stigmata of alcoholic liver disease hypoalbuminemia thrombocytopenia and clinical presentation does not appear to be alcohol withdrawal in nature no tachycardia hypertension tremor or tremulous and no hallucinations.  On further questioning 11/15 patient has rare alcohol use no history of alcohol withdrawal.  Patient has been monitor off alcohol withdrawal protocol and likely was precipitated by worsening benzo use causing delirium in the patient and not withdrawal.    Recommend agitated delirium precautions will DC all Librium and Ativan.  Recommend sleep hygiene  PT ambulate minimize restraints  Frequent reorientation  Minimize medications that cause somnolence or affect mental status    Please note patient did not have alcohol withdrawal do not treat him as such

## 2018-11-15 NOTE — CARE PLAN
Problem: Safety  Goal: Will remain free from injury  Outcome: PROGRESSING AS EXPECTED  Pt provided assistance to restroom. Pt provided education on safety, fall prevention, and use of call light. Treaded socks in place, call light within reach, hourly rounding in effect.       Problem: Skin Integrity  Goal: Risk for impaired skin integrity will decrease  Outcome: PROGRESSING AS EXPECTED  Pt risk factors for impaired skin integrity assessed, [place] score documented in flowsheet. Mepilex placed, heel float boots and pillows in use for support and positioning, Q 2 hr turning and hourly rounding in effect. Wounds dressed per orders.

## 2018-11-15 NOTE — PROGRESS NOTES
Report received from Tati TOBAR. Patient transferred to room 632 with belongings and informed of new assignment. Care assumed at this time

## 2018-11-15 NOTE — PROGRESS NOTES
2 RN assessment completed with EKATERINA Atwood.  Patient has wounds located on left lower leg (dressing in place), red but blanching elbows, abrasions noted on right wrist and forearm as well as left forearm.  Staples over healed wound on back of skull noted and removed per Dr. Stark's orders.  Mepilex placed. Left leg wounds dressed with hydrogel, ashley cream, telfa, ABD pad and roll gauze  Waffle cushion overlay in use.  Pictures taken and in chart.  Wound consult ordered.

## 2018-11-15 NOTE — ASSESSMENT & PLAN NOTE
Don't feel findings are consistent with EtOH withdrawal.   History and exam with staples in head  No focal exam will get Ct head non contrast rule out acute pathology.

## 2018-11-15 NOTE — CARE PLAN
Problem: Safety  Goal: Will remain free from injury  Outcome: PROGRESSING AS EXPECTED  Pt display extremely agitated and impulsive behavior. Pt walked around unit with the assistance of cna. Pt educated on the use of the call light. Bed alarm activated. Will continue to monitor.     Problem: Infection  Goal: Will remain free from infection  Outcome: PROGRESSING AS EXPECTED

## 2018-11-15 NOTE — PROGRESS NOTES
[facility] Medicine Daily Progress Note    Date of Service  11/15/2018    Chief Complaint  63 y.o. male admitted 11/9/2018 with non-healing wound    Hospital Course    Mr Varela has a history of necrotizing fascitis that was treated at [facility] in May.  Patient has a wound that has been slow to heal, he is followed by outpatient wound care.  He was admitted with suspected infection and developed severe agitation.  He was transferred to ICU.      Interval Problem Update  More alert today, at times a little agitated, sometimes yells at the staff  Knows he is at [facility], oriented to date  Tolerating antibiotics, no N/V  No evidence of seizures    Consultants/Specialty  Psychiatry  Infectious disease, Dr. Vieyra    Code Status  Full Code    Disposition  OK to transfer out of ICU    Review of Systems  Review of Systems   Constitutional: Negative for chills and malaise/fatigue.   Cardiovascular: Negative for chest pain, palpitations and orthopnea.   Gastrointestinal: Negative for abdominal pain, nausea and vomiting.   Skin: Negative for itching and rash.   Psychiatric/Behavioral: The patient is nervous/anxious.         Physical Exam  Temp:  [36.1 °C (96.9 °F)-36.9 °C (98.5 °F)] 36.9 °C (98.5 °F)  Pulse:  [] 72  Resp:  [13-24] 22    Physical Exam   Constitutional: He is oriented to person, place, and time. He appears well-developed and well-nourished.   HENT:   Head: Normocephalic and atraumatic.   Eyes: Conjunctivae and EOM are normal. Right eye exhibits no discharge. Left eye exhibits no discharge. No scleral icterus.   Neck: Normal range of motion. Neck supple.   Cardiovascular: Normal rate, regular rhythm and intact distal pulses.    No murmur heard.  2+ Radial Pulses  Brisk Capillary Refill   Pulmonary/Chest: Effort normal and breath sounds normal. No respiratory distress. He has no wheezes.   Abdominal: Soft. Bowel sounds are normal. He exhibits no distension. There is no tenderness. There is no rebound.    Musculoskeletal: Normal range of motion. He exhibits no edema.   Left leg with post surgical wound  Large area, clean appearing wound without purulence or obvious fluctuance   Neurological: He is alert and oriented to person, place, and time. No cranial nerve deficit.   Skin: Skin is warm and dry. He is not diaphoretic. No erythema.   Psychiatric: He has a normal mood and affect.       Fluids    Intake/Output Summary (Last 24 hours) at 11/15/18 1056  Last data filed at 11/15/18 1000   Gross per 24 hour   Intake              940 ml   Output             3380 ml   Net            -2440 ml       Laboratory  Recent Labs      11/13/18 0229 11/14/18   0455  11/15/18   0530   WBC  4.9  5.7  5.3   RBC  3.62*  3.94*  3.91*   HEMOGLOBIN  9.1*  9.9*  10.0*   HEMATOCRIT  30.0*  33.1*  33.3*   MCV  82.9  84.0  85.2   MCH  25.1*  25.1*  25.6*   MCHC  30.3*  29.9*  30.0*   RDW  54.2*  54.8*  56.4*   PLATELETCT  403  477*  523*   MPV  9.0  9.0  9.3     Recent Labs      11/13/18 0229 11/14/18   0455  11/15/18   0530   SODIUM  139  135  138   POTASSIUM  3.5*  4.4  3.9   CHLORIDE  105  104  105   CO2  25  23  22   GLUCOSE  100*  95  100*   BUN  9  14  14   CREATININE  0.73  0.95  0.84   CALCIUM  8.6  9.1  9.2                   Imaging  CT-HEAD W/O   Final Result      1.  No CT evidence of acute infarct, hemorrhage or mass.   2.  Global parenchymal atrophy and chronic small vessel ischemic changes.           Assessment/Plan  * Sepsis (HCC)   Assessment & Plan    This is sepsis (without associated acute organ dysfunction).   Source left lower extremity wound  Superficial culture indicates MRSA and beta Hemolytic Streptococcus group A   Sepsis is resolved     Delirium   Assessment & Plan    Patient tells me he has not been drinking for some time due to lack of funds  Admittedly he is not especially reliable historian  I suspect his altered mental status is more delirium than alcohol withdrawal, stopped CIWA     Open wound of left  lower leg- (present on admission)   Assessment & Plan    Nonhealing wound of the left lower leg  Superficial cultures are positive for MRSA and group A strep  Continue linezolid for now  I consulted Dr. Vieyra of ID for additional recommendations       Hypokalemia- (present on admission)   Assessment & Plan    Replaced     Microcytic anemia- (present on admission)   Assessment & Plan    At his baseline.  No signs of active bleeding.  Continue to monitor.     Essential hypertension   Assessment & Plan    Lisinopril     Severe protein-calorie malnutrition (HCC)   Assessment & Plan    Supplements     Debility   Assessment & Plan    PT/OT to eval      Lactic acidosis   Assessment & Plan    Resolved with fluid resuscitation     Tobacco use- (present on admission)   Assessment & Plan    Cessation recommended  Nicotine patch          VTE prophylaxis: heparin

## 2018-11-15 NOTE — PROGRESS NOTES
"Pt refusing to go to CT scan. Pt is totally disoriented. Stated that \"he is tired of being here and we are just making things up to keep him here.\" Used therapeutic communication in attempt to calm patient. Patient is easily redirected however, still noncompliant. Will continue to monitor.   "

## 2018-11-15 NOTE — ASSESSMENT & PLAN NOTE
Benign in appearance.   Patient has no white count fever or signs of inflammatory response  Continue Zyvox orally for group A strep coverage and MRSA    Will discuss with ID however I feel like we can stop antibiotic coverage wound culture was basically a skin culture no signs to suggest acute infection.

## 2018-11-15 NOTE — PROGRESS NOTES
Hospital Medicine Daily Progress Note    Date of Service  11/14/2018    Chief Complaint  63 y.o. male admitted 11/9/2018 with complaint of left leg wound infection    Hospital Course    *53-year-old male admitted to the hospital on November 9, 2018 with left leg wound infection.  Patient had a injury in May on his foot and he was evaluated at [facility] and he had necrotizing fasciitis that was debrided by surgery.  He has a poor compliance and he he has not been following wound care and not taking oral antibiotic as prescribed.  He has a history of chronic alcohol use.*      Interval Problem Update    Overnight high CIWA score of 30.  Increased requirement of lorazepam.  Due to high CIWA score transfer him to ICU for higher level of care.    Discuss with ICU hospitalist Dr. Velázquez.    Continue p.o. Antibiotic.    Consultants/Specialty  Psychiatry  Wound care    Code Status  Full    Disposition  SNF    Review of Systems  Review of Systems   Constitutional: Negative for chills and fever.   Eyes: Negative for blurred vision.   Respiratory: Negative for cough and shortness of breath.    Cardiovascular: Negative for chest pain and palpitations.   Gastrointestinal: Negative for nausea and vomiting.   Genitourinary: Negative for dysuria.   Musculoskeletal: Positive for joint pain and myalgias.   Neurological: Negative for dizziness, speech change and focal weakness.   Psychiatric/Behavioral: Negative for hallucinations.        Physical Exam  Temp:  [36.1 °C (96.9 °F)-37.1 °C (98.7 °F)] 36.1 °C (96.9 °F)  Pulse:  [] 87  Resp:  [16-24] 19  BP: (121-141)/(41-96) 140/88    Physical Exam   Constitutional: He is oriented to person, place, and time. No distress.   Neck: Normal range of motion.   Cardiovascular: Normal rate and regular rhythm.    No murmur heard.  Pulmonary/Chest: Breath sounds normal. No respiratory distress. He has no wheezes.   Abdominal: Soft. He exhibits no distension. There is no tenderness.    Musculoskeletal: He exhibits tenderness.   Bilateral hand tremors  Range of motion decreased on left lower extremity due to pain   Neurological: He is alert and oriented to person, place, and time. No cranial nerve deficit. Coordination normal.   Skin: He is not diaphoretic.   Left lower extremity has full extending from thigh to leg and covered medial to lateral aspect.           Fluids    Intake/Output Summary (Last 24 hours) at 11/14/18 1748  Last data filed at 11/14/18 1600   Gross per 24 hour   Intake              940 ml   Output             2930 ml   Net            -1990 ml       Laboratory  Recent Labs      11/12/18   0343  11/13/18 0229 11/14/18   0455   WBC  5.6  4.9  5.7   RBC  3.47*  3.62*  3.94*   HEMOGLOBIN  9.1*  9.1*  9.9*   HEMATOCRIT  28.5*  30.0*  33.1*   MCV  82.1  82.9  84.0   MCH  26.2*  25.1*  25.1*   MCHC  31.9*  30.3*  29.9*   RDW  52.2*  54.2*  54.8*   PLATELETCT  366  403  477*   MPV  8.9*  9.0  9.0     Recent Labs      11/12/18   0343  11/13/18 0229 11/14/18   0455   SODIUM  136  139  135   POTASSIUM  3.7  3.5*  4.4   CHLORIDE  102  105  104   CO2  24  25  23   GLUCOSE  100*  100*  95   BUN  8  9  14   CREATININE  0.73  0.73  0.95   CALCIUM  8.7  8.6  9.1                   Imaging  CT-HEAD W/O    (Results Pending)        Assessment/Plan  * Sepsis ([facility])   Assessment & Plan    This is sepsis (without associated acute organ dysfunction).   Source left lower extremity wound  Culture result indicates MRSA and beta Hemolytic Streptococcus group A   He is on linezolid.     Alcohol withdrawal hallucinosis (HCC)   Assessment & Plan    He has history of alcohol abuse.  Due to high CIWA score overnight transfer him to ICU for higher level of care.  Multivitamin     Open wound of left lower leg- (present on admission)   Assessment & Plan    He is not compliant with medical care.  Appreciate wound care recommendations.  Plan is to transfer his care to skilled nursing facility for wound care  management.       Hypokalemia- (present on admission)   Assessment & Plan    Found to have low potassium.  Replace as needed.     Microcytic anemia- (present on admission)   Assessment & Plan    At his baseline.  No signs of active bleeding.  Continue to monitor.     Essential hypertension   Assessment & Plan    Started him on lisinopril 5 mg daily.  Also ordered as needed blood pressure medications.     Severe protein-calorie malnutrition (HCC)   Assessment & Plan    Nutrition consult  Boost for the patient.     Debility   Assessment & Plan    PT/OT to eval      Lactic acidosis   Assessment & Plan    Resolved after infusion of IV fluids.  Lactic acid level normalized.     Tobacco use- (present on admission)   Assessment & Plan     smoking cessation counseling.Nicotine patch provided          VTE prophylaxis: Heparin

## 2018-11-15 NOTE — CARE PLAN
Problem: Communication  Goal: The ability to communicate needs accurately and effectively will improve  Outcome: PROGRESSING SLOWER THAN EXPECTED  Patient cannot remember how to call appropriately. White board updated. Patient repeatedly reoriented       Problem: Safety  Goal: Will remain free from injury  Outcome: PROGRESSING AS EXPECTED  Patient has remained free of injury but is highly impulsive and confused. Does not call appropriatly. Bed alarm on. RN in room for most charting. Reorient patient, providing frequent checks, toileting and activiy

## 2018-11-15 NOTE — WOUND TEAM
Received call from bedside RN, Tati. Pt was just transferred from  to [facility]. This RN familiar with patient as wound was just assessed and dressed on Sunday. Pt now with no dressing in place. This RN assisted bedside RN with dressing change as EPIC was down and nursing was unable to read orders. This RN assisted bedside RN with head to toe assessment and photographing areas of concern. No pressure injuries identified. Pt does have areas that are red over bony prominences but all areas are quick to diego and pt is moving frequently in bed. Sacral mepilex placed over sacrum. Nursing to apply mepilex to bilateral hips. Pt not a candidate for heel float boots as he is non compliant and attempts to get out of bed frequently.

## 2018-11-15 NOTE — CONSULTS
Critical Care Consultation    Date of consult: 11/14/2018    Referring Physician  LILIAN Hung    Reason for Consultation  Agitation and increasing CIWA    History of Presenting Illness  63 y.o. male who presented 11/9/2018 with left leg infection. He was seen at [facility] in May and had necrotizing fasciitis and debrided by surgery at that time. He has a history of alcohol abuse per records here and was on CIWA protocol and concerns for escalating agitation requiring ICU level care this morning. Patient is oriented only to person not to time. He is angry but not hallucinating, no tremor. He was able to get a walk around. Patient is demanding to go home and doesn't understand why he is in the hospital. The rest of the history is limited and unable to be obtained secondary to mental status.     Code Status  Full Code    Review of Systems  Review of Systems   Unable to perform ROS: Psychiatric disorder   Constitutional: Negative for chills.   Respiratory: Negative for cough and shortness of breath.    Cardiovascular: Negative for chest pain.   Gastrointestinal: Negative for abdominal pain.   Neurological: Negative for numbness.   Psychiatric/Behavioral: Negative for behavioral problems and confusion.   All other systems reviewed and are negative.      Past Medical History   has a past medical history of Psychiatric problem; Restless leg; and Tobacco use.    Surgical History   has a past surgical history that includes ankle orif (Right).    Family History  family history includes Heart Disease in his mother; No Known Problems in his father.    Social History   reports that he has been smoking Cigarettes.  He has a 12.00 pack-year smoking history. He has never used smokeless tobacco. He reports that he uses drugs, including Marijuana and Inhaled. He reports that he does not drink alcohol.    Medications  Home Medications     Reviewed by Denise Monroe R.N. (Registered Nurse) on 11/12/18 at 0207  Med  List Status: Unable to Obtain   Medication Last Dose Status   amoxicillin-clavulanate (AUGMENTIN) 875-125 MG Tab  Active   sulfamethoxazole-trimethoprim (BACTRIM DS) 800-160 MG tablet  Active              Current Facility-Administered Medications   Medication Dose Route Frequency Provider Last Rate Last Dose   • lisinopril (PRINIVIL) tablet 5 mg  5 mg Oral Q DAY Dayanara Santos M.D.   5 mg at 11/14/18 0449   • labetalol (NORMODYNE,TRANDATE) injection 10 mg  10 mg Intravenous Q6HRS PRN Dayanara Santos M.D.       • HYDROcodone-acetaminophen (NORCO) 5-325 MG per tablet 1 Tab  1 Tab Oral Q4HRS PRN BRY LockwoodORachael   1 Tab at 11/13/18 2110   • linezolid (ZYVOX) tablet 600 mg  600 mg Oral Q12HRS Jose Alejandro De Leon D.O.   600 mg at 11/14/18 1834   • nicotine (NICODERM) 21 MG/24HR 21 mg  21 mg Transdermal Daily-0600 Jose Alejandro De Leon D.O.   21 mg at 11/14/18 0452    And   • nicotine polacrilex (NICORETTE) 2 MG piece 2 mg  2 mg Oral Q HOUR PRN Jose Alejandro De Leon D.O.       • haloperidol lactate (HALDOL) injection 2.5 mg  2.5 mg Intravenous Q4HRS PRN Jose Alejandro De Leon D.O.   2.5 mg at 11/14/18 0702   • NS infusion 2,028 mL  30 mL/kg Intravenous Once PRN Debra Perkins M.D.       • NS (BOLUS) infusion 500 mL  500 mL Intravenous Once PRN Debra Perkins M.D.   Stopped at 11/13/18 1334   • thiamine tablet 100 mg  100 mg Oral DAILY Jose Alejandro De Leon D.O.   100 mg at 11/14/18 0450   • senna-docusate (PERICOLACE or SENOKOT S) 8.6-50 MG per tablet 2 Tab  2 Tab Oral BID Debra Perkins M.D.   2 Tab at 11/14/18 1834    And   • polyethylene glycol/lytes (MIRALAX) PACKET 1 Packet  1 Packet Oral QDAY PRN Debra Perkins M.D.        And   • magnesium hydroxide (MILK OF MAGNESIA) suspension 30 mL  30 mL Oral QDAY PRN Debra Perkins M.D.        And   • bisacodyl (DULCOLAX) suppository 10 mg  10 mg Rectal QDAY PRN Debra Perkins M.D.       • heparin injection 5,000 Units  5,000 Units Subcutaneous Q8HRS Debra Perkins M.D.   5,000  Units at 11/14/18 1550   • ondansetron (ZOFRAN) syringe/vial injection 4 mg  4 mg Intravenous Q4HRS PRSABINE Perkins M.D.       • ondansetron (ZOFRAN ODT) dispertab 4 mg  4 mg Oral Q4HRS PRSABINE Perkins M.D.       • promethazine (PHENERGAN) tablet 12.5-25 mg  12.5-25 mg Oral Q4HRS PRSABINE Perkins M.D.       • promethazine (PHENERGAN) suppository 12.5-25 mg  12.5-25 mg Rectal Q4HRS PRSABINE Perkins M.D.       • prochlorperazine (COMPAZINE) injection 5-10 mg  5-10 mg Intravenous Q4HRS PRSABINE Perkins M.D.           Allergies  No Known Allergies    Vital Signs last 24 hours  Temp:  [36.1 °C (96.9 °F)-37.1 °C (98.7 °F)] 36.1 °C (96.9 °F)  Pulse:  [] 84  Resp:  [16-24] 23  BP: (121-141)/(41-96) 140/88    Physical Exam  Physical Exam   Constitutional: No distress.   Frail elderly male greater than stated age   HENT:   Head: Normocephalic.   Left parietal staples in place   Eyes: Pupils are equal, round, and reactive to light. EOM are normal.   Neck: Normal range of motion. Neck supple. No JVD present. No tracheal deviation present. No thyromegaly present.   Cardiovascular: Normal rate, regular rhythm and normal heart sounds.    No murmur heard.  Pulmonary/Chest: Breath sounds normal. No stridor. No respiratory distress. He has no wheezes. He has no rales. He exhibits no tenderness.   Abdominal: Soft. Bowel sounds are normal.   Musculoskeletal: He exhibits no edema.   Left leg with bandages on it   Neurological: He is alert. No cranial nerve deficit. Coordination normal.   To person not to time place or medical illness moves all extremities with a non-ataxic gait no rigidity no tremor or fasciculations of the tongue   Skin: Skin is warm and dry. He is not diaphoretic.   Psychiatric:   Angry elderly man confused wanting to go home trying to pack his bags       Fluids    Intake/Output Summary (Last 24 hours) at 11/14/18 1846  Last data filed at 11/14/18 1800   Gross per 24 hour   Intake              1070 ml   Output             3080 ml   Net            -2010 ml       Laboratory  Recent Results (from the past 48 hour(s))   CBC WITH DIFFERENTIAL    Collection Time: 11/13/18  2:29 AM   Result Value Ref Range    WBC 4.9 4.8 - 10.8 K/uL    RBC 3.62 (L) 4.70 - 6.10 M/uL    Hemoglobin 9.1 (L) 14.0 - 18.0 g/dL    Hematocrit 30.0 (L) 42.0 - 52.0 %    MCV 82.9 81.4 - 97.8 fL    MCH 25.1 (L) 27.0 - 33.0 pg    MCHC 30.3 (L) 33.7 - 35.3 g/dL    RDW 54.2 (H) 35.9 - 50.0 fL    Platelet Count 403 164 - 446 K/uL    MPV 9.0 9.0 - 12.9 fL    Neutrophils-Polys 45.50 44.00 - 72.00 %    Lymphocytes 33.10 22.00 - 41.00 %    Monocytes 18.20 (H) 0.00 - 13.40 %    Eosinophils 1.80 0.00 - 6.90 %    Basophils 0.60 0.00 - 1.80 %    Immature Granulocytes 0.80 0.00 - 0.90 %    Nucleated RBC 0.00 /100 WBC    Neutrophils (Absolute) 2.23 1.82 - 7.42 K/uL    Lymphs (Absolute) 1.62 1.00 - 4.80 K/uL    Monos (Absolute) 0.89 (H) 0.00 - 0.85 K/uL    Eos (Absolute) 0.09 0.00 - 0.51 K/uL    Baso (Absolute) 0.03 0.00 - 0.12 K/uL    Immature Granulocytes (abs) 0.04 0.00 - 0.11 K/uL    NRBC (Absolute) 0.00 K/uL   Renal Function Panel    Collection Time: 11/13/18  2:29 AM   Result Value Ref Range    Sodium 139 135 - 145 mmol/L    Potassium 3.5 (L) 3.6 - 5.5 mmol/L    Chloride 105 96 - 112 mmol/L    Co2 25 20 - 33 mmol/L    Glucose 100 (H) 65 - 99 mg/dL    Creatinine 0.73 0.50 - 1.40 mg/dL    Bun 9 8 - 22 mg/dL    Calcium 8.6 8.5 - 10.5 mg/dL    Phosphorus 3.7 2.5 - 4.5 mg/dL    Albumin 3.0 (L) 3.2 - 4.9 g/dL   ESTIMATED GFR    Collection Time: 11/13/18  2:29 AM   Result Value Ref Range    GFR If African American >60 >60 mL/min/1.73 m 2    GFR If Non African American >60 >60 mL/min/1.73 m 2   CBC WITHOUT DIFFERENTIAL    Collection Time: 11/14/18  4:55 AM   Result Value Ref Range    WBC 5.7 4.8 - 10.8 K/uL    RBC 3.94 (L) 4.70 - 6.10 M/uL    Hemoglobin 9.9 (L) 14.0 - 18.0 g/dL    Hematocrit 33.1 (L) 42.0 - 52.0 %    MCV 84.0 81.4 - 97.8 fL    MCH  25.1 (L) 27.0 - 33.0 pg    MCHC 29.9 (L) 33.7 - 35.3 g/dL    RDW 54.8 (H) 35.9 - 50.0 fL    Platelet Count 477 (H) 164 - 446 K/uL    MPV 9.0 9.0 - 12.9 fL   BASIC METABOLIC PANEL    Collection Time: 11/14/18  4:55 AM   Result Value Ref Range    Sodium 135 135 - 145 mmol/L    Potassium 4.4 3.6 - 5.5 mmol/L    Chloride 104 96 - 112 mmol/L    Co2 23 20 - 33 mmol/L    Glucose 95 65 - 99 mg/dL    Bun 14 8 - 22 mg/dL    Creatinine 0.95 0.50 - 1.40 mg/dL    Calcium 9.1 8.5 - 10.5 mg/dL    Anion Gap 8.0 0.0 - 11.9   MAGNESIUM    Collection Time: 11/14/18  4:55 AM   Result Value Ref Range    Magnesium 1.9 1.5 - 2.5 mg/dL   ESTIMATED GFR    Collection Time: 11/14/18  4:55 AM   Result Value Ref Range    GFR If African American >60 >60 mL/min/1.73 m 2    GFR If Non African American >60 >60 mL/min/1.73 m 2       Imaging  CT-HEAD W/O    (Results Pending)       Assessment/Plan  Alcohol withdrawal hallucinosis (HCC)   Assessment & Plan    Patient has a reported history of alcohol has no stigmata of alcoholic liver disease hypoalbuminemia thrombocytopenia and clinical presentation does not appear to be alcohol withdrawal in nature no tachycardia hypertension tremor or tremulous and no hallucinations.     Recommend agitated delirium precautions will DC all Librium and Ativan.  Recommend sleep hygiene  PT ambulate minimize restraints  Frequent reorientation  Minimize medications that cause somnolence or affect mental status  Continue to monitor for concerns of alcohol withdrawal     Open wound of left lower leg- (present on admission)   Assessment & Plan    Benign in appearance.   Patient has no white count fever or signs of inflammatory response  Continue Zyvox orally for group A strep coverage and MRSA    Continue wound care following     Hypokalemia- (present on admission)   Assessment & Plan    Continue to replace and monitor     Microcytic anemia- (present on admission)   Assessment & Plan    Continue to monitor no signs of  bleeding     Agitation   Assessment & Plan    Don't feel findings are consistent with EtOH withdrawal.   History and exam with staples in head  No focal exam will get Ct head non contrast rule out acute pathology.      Essential hypertension   Assessment & Plan    Continue lisinopril and monitor     Lactic acidosis   Assessment & Plan    Resolved continue to monitor     Tobacco use- (present on admission)   Assessment & Plan    Continue to recommend cessation and use nicotine patch          Discussed patient condition and risk of morbidity and/or mortality with Hospitalist, RN, RT, Pharmacy and Patient.      Marcio Stark MD  Critical Care Medicine

## 2018-11-16 PROCEDURE — A9270 NON-COVERED ITEM OR SERVICE: HCPCS | Performed by: FAMILY MEDICINE

## 2018-11-16 PROCEDURE — 700102 HCHG RX REV CODE 250 W/ 637 OVERRIDE(OP): Performed by: INTERNAL MEDICINE

## 2018-11-16 PROCEDURE — 700102 HCHG RX REV CODE 250 W/ 637 OVERRIDE(OP): Performed by: HOSPITALIST

## 2018-11-16 PROCEDURE — 700102 HCHG RX REV CODE 250 W/ 637 OVERRIDE(OP): Performed by: FAMILY MEDICINE

## 2018-11-16 PROCEDURE — A9270 NON-COVERED ITEM OR SERVICE: HCPCS | Performed by: INTERNAL MEDICINE

## 2018-11-16 PROCEDURE — 99232 SBSQ HOSP IP/OBS MODERATE 35: CPT | Performed by: INTERNAL MEDICINE

## 2018-11-16 PROCEDURE — 700111 HCHG RX REV CODE 636 W/ 250 OVERRIDE (IP): Performed by: HOSPITALIST

## 2018-11-16 PROCEDURE — 99232 SBSQ HOSP IP/OBS MODERATE 35: CPT | Performed by: HOSPITALIST

## 2018-11-16 PROCEDURE — 770021 HCHG ROOM/CARE - ISO PRIVATE

## 2018-11-16 PROCEDURE — A9270 NON-COVERED ITEM OR SERVICE: HCPCS | Performed by: HOSPITALIST

## 2018-11-16 RX ADMIN — THIAMINE HCL TAB 100 MG 100 MG: 100 TAB at 04:57

## 2018-11-16 RX ADMIN — STANDARDIZED SENNA CONCENTRATE AND DOCUSATE SODIUM 2 TABLET: 8.6; 5 TABLET, FILM COATED ORAL at 17:15

## 2018-11-16 RX ADMIN — HEPARIN SODIUM 5000 UNITS: 5000 INJECTION, SOLUTION INTRAVENOUS; SUBCUTANEOUS at 14:31

## 2018-11-16 RX ADMIN — ACETAMINOPHEN 650 MG: 325 TABLET, FILM COATED ORAL at 04:57

## 2018-11-16 RX ADMIN — STANDARDIZED SENNA CONCENTRATE AND DOCUSATE SODIUM 2 TABLET: 8.6; 5 TABLET, FILM COATED ORAL at 04:57

## 2018-11-16 RX ADMIN — SULFAMETHOXAZOLE AND TRIMETHOPRIM 1 TABLET: 800; 160 TABLET ORAL at 04:57

## 2018-11-16 RX ADMIN — HYDROCODONE BITARTRATE AND ACETAMINOPHEN 1 TABLET: 5; 325 TABLET ORAL at 13:19

## 2018-11-16 RX ADMIN — ENOXAPARIN SODIUM 40 MG: 100 INJECTION SUBCUTANEOUS at 18:06

## 2018-11-16 RX ADMIN — ACETAMINOPHEN 650 MG: 325 TABLET, FILM COATED ORAL at 22:36

## 2018-11-16 RX ADMIN — LISINOPRIL 5 MG: 5 TABLET ORAL at 04:57

## 2018-11-16 RX ADMIN — HEPARIN SODIUM 5000 UNITS: 5000 INJECTION, SOLUTION INTRAVENOUS; SUBCUTANEOUS at 04:57

## 2018-11-16 RX ADMIN — SULFAMETHOXAZOLE AND TRIMETHOPRIM 1 TABLET: 800; 160 TABLET ORAL at 17:16

## 2018-11-16 RX ADMIN — HYDROCODONE BITARTRATE AND ACETAMINOPHEN 1 TABLET: 5; 325 TABLET ORAL at 19:41

## 2018-11-16 RX ADMIN — ACETAMINOPHEN 650 MG: 325 TABLET, FILM COATED ORAL at 14:31

## 2018-11-16 ASSESSMENT — PAIN SCALES - GENERAL
PAINLEVEL_OUTOF10: 8
PAINLEVEL_OUTOF10: 2
PAINLEVEL_OUTOF10: 0
PAINLEVEL_OUTOF10: 8
PAINLEVEL_OUTOF10: 2
PAINLEVEL_OUTOF10: 5
PAINLEVEL_OUTOF10: 2
PAINLEVEL_OUTOF10: 2
PAINLEVEL_OUTOF10: 10

## 2018-11-16 ASSESSMENT — PATIENT HEALTH QUESTIONNAIRE - PHQ9
1. LITTLE INTEREST OR PLEASURE IN DOING THINGS: NOT AT ALL
SUM OF ALL RESPONSES TO PHQ9 QUESTIONS 1 AND 2: 0
2. FEELING DOWN, DEPRESSED, IRRITABLE, OR HOPELESS: NOT AT ALL
1. LITTLE INTEREST OR PLEASURE IN DOING THINGS: NOT AT ALL
1. LITTLE INTEREST OR PLEASURE IN DOING THINGS: NOT AT ALL
2. FEELING DOWN, DEPRESSED, IRRITABLE, OR HOPELESS: NOT AT ALL
2. FEELING DOWN, DEPRESSED, IRRITABLE, OR HOPELESS: NOT AT ALL

## 2018-11-16 ASSESSMENT — ENCOUNTER SYMPTOMS
SORE THROAT: 0
CONSTIPATION: 0
NERVOUS/ANXIOUS: 1
SHORTNESS OF BREATH: 0
VOMITING: 0
HEADACHES: 0
ABDOMINAL PAIN: 0
WEAKNESS: 1
CHILLS: 0
MYALGIAS: 1
SENSORY CHANGE: 1
NAUSEA: 0
FEVER: 0
ORTHOPNEA: 0
DIARRHEA: 0
PALPITATIONS: 0

## 2018-11-16 NOTE — DISCHARGE PLANNING
Met with patient, seems unsure if he has gotten medications from a pharmacy before or not.  Aware he will need antibiotic at discharge, [facility], they state if we fax the Rx over they can run it to see if its covered under his Medicaid.

## 2018-11-16 NOTE — PROGRESS NOTES
Infectious Disease Progress Note    Author: FELIPE Bacon Date & Time of service: 2018  12:40 PM    Chief Complaint:  Left leg infection +MRSA & GAS    Interval History:  63 y.o. Male with LLE infection +MRSA & GAS, previously treated with IV abx at [facility] in May 2018.  Additionally under [facility] protocol d/t alcoholism.  - AF, no WBC, no issue with abx, 5/10 LLE pain, anxious to discharge, asking for bus pass d/t being homeless.   Labs Reviewed, Medications Reviewed, Radiology Reviewed and Wound Reviewed.    Review of Systems:  Review of Systems   Constitutional: Negative for chills, fever and malaise/fatigue.   HENT: Negative for sore throat.    Respiratory: Negative for shortness of breath.    Cardiovascular: Positive for leg swelling. Negative for chest pain and palpitations.   Gastrointestinal: Negative for abdominal pain, constipation, diarrhea, nausea and vomiting.   Genitourinary: Negative for dysuria and hematuria.   Musculoskeletal: Positive for joint pain and myalgias.   Skin: Negative for rash.   Neurological: Positive for sensory change and weakness. Negative for headaches.   Psychiatric/Behavioral: The patient is nervous/anxious.        Hemodynamics:  Temp (24hrs), Av.7 °C (98 °F), Min:36.4 °C (97.5 °F), Max:37.2 °C (99 °F)  Temperature: 37.2 °C (99 °F)  Pulse  Av.2  Min: 48  Max: 108Heart Rate (Monitored): (!) 58  Blood Pressure: 114/73, NIBP: (!) 165/90       Physical Exam:  Physical Exam   Constitutional: He is oriented to person, place, and time. He appears well-developed and well-nourished. No distress.   Thin  Appears older than stated age  Disheveled   HENT:   Head: Normocephalic and atraumatic.   Poor dentition- missing teeth   Eyes: Pupils are equal, round, and reactive to light. Conjunctivae and EOM are normal. No scleral icterus.   Neck: Normal range of motion. Neck supple. No tracheal deviation present.   Cardiovascular: Normal rate, regular rhythm,  normal heart sounds and intact distal pulses.    No murmur heard.  Pulmonary/Chest: Effort normal and breath sounds normal. No respiratory distress. He has no wheezes.   Abdominal: Soft. Bowel sounds are normal. He exhibits no distension. There is no tenderness.   Musculoskeletal: He exhibits edema and tenderness.   LLE- dressing in place, no drainage seeping through dressing.  Picture from 11/11 reviewed- wound bed pink, mild erythema to surrounding tissue, no maceration along edges of wound, dry skin to surrounding tissue.   Neurological: He is alert and oriented to person, place, and time.   Skin: Skin is warm. There is erythema.   Psychiatric:   Anxious   Nursing note and vitals reviewed.        Meds:    Current Facility-Administered Medications:   •  acetaminophen  •  sulfamethoxazole-trimethoprim  •  lisinopril  •  labetalol  •  HYDROcodone-acetaminophen  •  nicotine **AND** Nicotine Replacement Patient Education Materials **AND** nicotine polacrilex  •  haloperidol lactate  •  NS  •  NS  •  thiamine **AND** [COMPLETED] multivitamin **AND** [COMPLETED] folic acid  •  senna-docusate **AND** polyethylene glycol/lytes **AND** magnesium hydroxide **AND** bisacodyl  •  heparin  •  ondansetron  •  ondansetron  •  promethazine  •  promethazine  •  prochlorperazine    Labs:  Recent Labs      11/14/18   0455  11/15/18   0530   WBC  5.7  5.3   RBC  3.94*  3.91*   HEMOGLOBIN  9.9*  10.0*   HEMATOCRIT  33.1*  33.3*   MCV  84.0  85.2   MCH  25.1*  25.6*   RDW  54.8*  56.4*   PLATELETCT  477*  523*   MPV  9.0  9.3     Recent Labs      11/14/18   0455  11/15/18   0530   SODIUM  135  138   POTASSIUM  4.4  3.9   CHLORIDE  104  105   CO2  23  22   GLUCOSE  95  100*   BUN  14  14     Recent Labs      11/14/18   0455  11/15/18   0530   CREATININE  0.95  0.84       Imaging:  Ct-head W/o    Result Date: 11/15/2018   CT HEAD WITHOUT CONTRAST 11/15/2018 3:24 PM HISTORY/REASON FOR EXAM:  Altered Mental Status. TECHNIQUE/EXAM  "DESCRIPTION AND NUMBER OF VIEWS: CT of the head without contrast. The study was performed on a helical multidetector CT scanner. Contiguous 2.5 mm axial sections were obtained from the skull base through the vertex. Up to date radiation dose reduction adjustments have been utilized to meet ALARA standards for radiation dose reduction. COMPARISON:  8/25/2018 FINDINGS: Lateral ventricles are normal in size and symmetric. Mild global parenchymal atrophy without focality. Chronic small vessel ischemic changes. No significant mass effect or midline shift. Basal cisterns are patent. No evidence for intracranial hemorrhage. Calvaria are intact. Atherosclerosis. Visualized orbits are unremarkable. Visualized mastoid air cells are clear. Mild mucosal thickening in the frontal nasal duct. Otherwise, visualized paranasal sinuses are unremarkable.     1.  No CT evidence of acute infarct, hemorrhage or mass. 2.  Global parenchymal atrophy and chronic small vessel ischemic changes.      Micro:  Results     Procedure Component Value Units Date/Time    Blood Culture [[id number]] Collected:  11/09/18 1951    Order Status:  Completed Specimen:  Blood from Peripheral Updated:  11/14/18 2300     Significant Indicator NEG     Source BLD     Site PERIPHERAL     Blood Culture No growth after 5 days of incubation.    Narrative:       From different peripheral sites, if not done within the last  24 hours (Per Hospital Policy: Only change specimen source to  \"Line\" if specified by physician order)    Blood Culture [[id number]] Collected:  11/09/18 1946    Order Status:  Completed Specimen:  Blood from Peripheral Updated:  11/14/18 2300     Significant Indicator NEG     Source BLD     Site PERIPHERAL     Blood Culture No growth after 5 days of incubation.    Narrative:       From different peripheral sites, if not done within the last  24 hours (Per Hospital Policy: Only change specimen source to  \"Line\" if specified by physician order)    " CULTURE WOUND W/ GRAM STAIN [[id number]]  (Abnormal)  (Susceptibility) Collected:  11/09/18 2022    Order Status:  Completed Specimen:  Wound from Left Leg Updated:  11/12/18 0821     Significant Indicator POS (POS)     Source WND     Site LEFT LEG     Culture Result Wound -- (A)     Gram Stain Result Rare Gram positive cocci.     Culture Result Wound Beta Hemolytic Streptococcus group A  Light growth   (A)      Methicillin Resistant Staphylococcus aureus  Light growth   (A)    Narrative:       CALL  Hernandez  141 tel. [phone],    Culture & Susceptibility     METHICILLIN RESISTANT STAPHYLOCOCCUS AUREUS     Antibiotic Sensitivity Microscan Unit Status    Ampicillin/sulbactam Resistant >16/8 mcg/mL Final    Method: SENSITIVITY, CATHERINE    Clindamycin Sensitive <=0.5 mcg/mL Final    Method: SENSITIVITY, CATHERINE    Daptomycin Sensitive 1 mcg/mL Final    Method: SENSITIVITY, CATHERINE    Erythromycin Resistant >4 mcg/mL Final    Method: SENSITIVITY, CATHERINE    Moxifloxacin Sensitive 1 mcg/mL Final    Method: SENSITIVITY, CATHERINE    Oxacillin Resistant >2 mcg/mL Final    Method: SENSITIVITY, CATHERINE    Penicillin Resistant >8 mcg/mL Final    Method: SENSITIVITY, CATHERINE    Tetracycline Sensitive <=4 mcg/mL Final    Method: SENSITIVITY, CATHERINE    Trimeth/Sulfa Sensitive <=0.5/9.5 mcg/mL Final    Method: SENSITIVITY, CATHERINE    Vancomycin Sensitive 2 mcg/mL Final    Method: SENSITIVITY, CATHERINE                       GRAM STAIN [[id number]] Collected:  11/09/18 2022    Order Status:  Completed Specimen:  Wound Updated:  11/10/18 0916     Significant Indicator .     Source WND     Site LEFT LEG     Gram Stain Result Rare Gram positive cocci.          Assessment:  Active Hospital Problems    Diagnosis   • *Sepsis (HCC) [A41.9]   • Open wound of left lower leg [S81.802A]   • Tobacco use [Z72.0]   • Agitation [R45.1]   • Debility [R53.81]       Plan:  Chronic left leg wound with poor healing, +MRSA & GAS  Afebrile  No leukocytosis on last check  Wound cx on 11/9 +MRSA &  GAS  Likely colonized MRSA as wound does not appear grossly infected  Continue PO Bactrim DS BID through 11/22/18 to complete 7 day course    Sepsis- resolved  Bcx on 11/9 negative    Alcoholism  CIWA protocol- was in ICU for agitation/withdrawls    Tobacco Abuse  Will hinder wound healing and resolution of infection  Cessation encouraged    Homelessness  Asking for bus pass  Need to ensure abx will be covered by insurance as pt has not money to pay for medication      Discussed with PIYUSH Heard RN.  Should be able to get pt bus pass at time of discharge.  Pt's insurance should cover cost of Bactrim, will look into this.    ID will sign off, please re-consult if needed.  FU in ID clinic in 1-2 weeks.

## 2018-11-16 NOTE — CONSULTS
ADULT INFECTIOUS DISEASE CONSULT     Date of Service: 11/15/2018    Consult Requested By: Ashish Velázquez M.D.    Reason for Consultation: Left leg infection    History of Present Illness:   Bola Varela is a 63 y.o. male with history of cognitive impairment, alcoholism, tobacco abuse has been following up at wound care clinic for treatment of a very large full-thickness wound to his left medial leg.  Apparently he was hiking in May or June 2018 when he fell down her head hitting pushes and branches along the way.  He underwent debridement of necrotizing fasciitis in [place] in May 2018 and was treated with IV antibiotics.  Apparently he has flexion contraction of his leg of unclear etiology.  Since then he was again admitted on 8/25/2018 with left leg pain and then again on 10/24/2018 with unsteady gait.  His leg was found to be malodorous.  His alcohol level was 0.39.  His wound cultures grew MRSA sensitive to Bactrim as well as strep.  He was discharged on Bactrim and Augmentin.  He has been struggling with poor wound care and again presented to the emergency room on 11/9/2018 with left leg wound.  He developed increasing agitation and CIWA.  He was transferred to the intensive care unit.  He is being treated with p.o. Zyvox.  Infectious disease consult has been called for duration of antibiotics.  Review Of Systems:  Denies any fevers denies any chills denies any nausea vomiting diarrhea.  Complains of pain in the leg and inability to walk.  Patient is slightly tangential.        PMH:   Past Medical History:   Diagnosis Date   • Psychiatric problem    • Restless leg    • Tobacco use          PSH:  Past Surgical History:   Procedure Laterality Date   • ANKLE ORIF Right        FAMILY HX:  Family History   Problem Relation Age of Onset   • Heart Disease Mother    • No Known Problems Father    • Cancer Neg Hx    • Diabetes Neg Hx    • Stroke Neg Hx        SOCIAL HX:  Social History     Social History   •  Marital status: Single     Spouse name: N/A   • Number of children: N/A   • Years of education: N/A     Occupational History   • Not on file.     Social History Main Topics   • Smoking status: Current Every Day Smoker     Packs/day: 0.30     Years: 40.00     Types: Cigarettes   • Smokeless tobacco: Never Used      Comment: 1 ppd until few years ago   • Alcohol use No      Comment: previous heavy alcohol use, on and off   • Drug use: Yes     Types: Marijuana, Inhaled      Comment: marijuana daily    • Sexual activity: Not Currently     Partners: Female     Other Topics Concern   • Not on file     Social History Narrative   • No narrative on file     History   Smoking Status   • Current Every Day Smoker   • Packs/day: 0.30   • Years: 40.00   • Types: Cigarettes   Smokeless Tobacco   • Never Used     Comment: 1 ppd until few years ago     History   Alcohol Use No     Comment: previous heavy alcohol use, on and off       Allergies/Intolerances:  No Known Allergies    History reviewed with the patient    Other Current Medications:    Current Facility-Administered Medications:   •  acetaminophen (TYLENOL) tablet 650 mg, 650 mg, Oral, Q8HRS, Marcio Stark M.D., 650 mg at 11/15/18 1118  •  lisinopril (PRINIVIL) tablet 5 mg, 5 mg, Oral, Q DAY, Dayanara Santos M.D., Stopped at 11/15/18 0600  •  labetalol (NORMODYNE,TRANDATE) injection 10 mg, 10 mg, Intravenous, Q6HRS PRN, Dayanara Santos M.D.  •  HYDROcodone-acetaminophen (NORCO) 5-325 MG per tablet 1 Tab, 1 Tab, Oral, Q4HRS PRN, Vance Jimenes D.O., 1 Tab at 11/15/18 0755  •  linezolid (ZYVOX) tablet 600 mg, 600 mg, Oral, Q12HRS, Jose Alejandro De Leon D.O., 600 mg at 11/15/18 0516  •  nicotine (NICODERM) 21 MG/24HR 21 mg, 21 mg, Transdermal, Daily-0600, 21 mg at 11/15/18 0516 **AND** Nicotine Replacement Patient Education Materials, , , Once **AND** nicotine polacrilex (NICORETTE) 2 MG piece 2 mg, 2 mg, Oral, Q HOUR PRN, Jose Alejandro De Leon D.O.  •  haloperidol lactate  "(HALDOL) injection 2.5 mg, 2.5 mg, Intravenous, Q4HRS PRN, Jose Alejandro De Leon D.PAULETTE, 2.5 mg at 18 0702  •  NS infusion 2,028 mL, 30 mL/kg, Intravenous, Once PRN, Debra Perkins M.D.  •  NS (BOLUS) infusion 500 mL, 500 mL, Intravenous, Once PRN, Debra Perkins M.D., Stopped at 18 1334  •  thiamine tablet 100 mg, 100 mg, Oral, DAILY, 100 mg at 11/15/18 0516 **AND** [COMPLETED] multivitamin (THERAGRAN) tablet 1 Tab, 1 Tab, Oral, DAILY, 1 Tab at 18 0513 **AND** [COMPLETED] folic acid (FOLVITE) tablet 1 mg, 1 mg, Oral, DAILY, Jose Alejandro De Leon D.O., 1 mg at 18 0513  •  senna-docusate (PERICOLACE or SENOKOT S) 8.6-50 MG per tablet 2 Tab, 2 Tab, Oral, BID, 2 Tab at 11/15/18 0516 **AND** polyethylene glycol/lytes (MIRALAX) PACKET 1 Packet, 1 Packet, Oral, QDAY PRN **AND** magnesium hydroxide (MILK OF MAGNESIA) suspension 30 mL, 30 mL, Oral, QDAY PRN **AND** bisacodyl (DULCOLAX) suppository 10 mg, 10 mg, Rectal, QDAY PRN, Debra Perkins M.D.  •  heparin injection 5,000 Units, 5,000 Units, Subcutaneous, Q8HRS, Debra Perkins M.D., 5,000 Units at 11/15/18 1306  •  ondansetron (ZOFRAN) syringe/vial injection 4 mg, 4 mg, Intravenous, Q4HRS PRN, Debra Perkins M.D.  •  ondansetron (ZOFRAN ODT) dispertab 4 mg, 4 mg, Oral, Q4HRS PRN, Debra Perkins M.D.  •  promethazine (PHENERGAN) tablet 12.5-25 mg, 12.5-25 mg, Oral, Q4HRS PRN, Debra Perkins M.D.  •  promethazine (PHENERGAN) suppository 12.5-25 mg, 12.5-25 mg, Rectal, Q4HRS PRN, Debra Perkins M.D.  •  prochlorperazine (COMPAZINE) injection 5-10 mg, 5-10 mg, Intravenous, Q4HRS PRN, Debra Perkins M.D.  [unfilled]    Most Recent Vital Signs:  /88   Pulse 91   Temp 36.7 °C (98 °F) (Temporal)   Resp (!) 21   Ht 1.778 m (5' 10\")   Wt 67.6 kg (149 lb 0.5 oz)   SpO2 97%   BMI 21.38 kg/m²   Temp  Av.8 °C (98.2 °F)  Min: 36.1 °C (96.9 °F)  Max: 37.7 °C (99.8 °F)    Physical Exam:  General: Nontoxic, no acute " "distress  HEENT: sclera anicteric, edentulous  Neck: supple, no lymphadenopathy  Chest: CTAB, no r/r/w, normal work of breathing.  Cardiac: Regular, no murmurs no gallops heard  Abdomen: + bowel sounds, soft, non-tender, non-distended, no HSM  Extremities: Left leg is bandaged-refer to the pictures  Skin: no rashes or erythema  Neuro: Alert and responsive. Not fully oriented    Pertinent Lab Results:  Recent Labs      11/13/18   0229  11/14/18   0455  11/15/18   0530   WBC  4.9  5.7  5.3      Recent Labs      11/13/18   0229  11/14/18   0455  11/15/18   0530   HEMOGLOBIN  9.1*  9.9*  10.0*   HEMATOCRIT  30.0*  33.1*  33.3*   MCV  82.9  84.0  85.2   MCH  25.1*  25.1*  25.6*   PLATELETCT  403  477*  523*         Recent Labs      11/13/18   0229  11/14/18   0455  11/15/18   0530   SODIUM  139  135  138   POTASSIUM  3.5*  4.4  3.9   CHLORIDE  105  104  105   CO2  25  23  22   CREATININE  0.73  0.95  0.84        Recent Labs      11/13/18 0229   ALBUMIN  3.0*        Pertinent Micro:  Results     Procedure Component Value Units Date/Time    Blood Culture [[id number]] Collected:  11/09/18 1951    Order Status:  Completed Specimen:  Blood from Peripheral Updated:  11/14/18 2300     Significant Indicator NEG     Source BLD     Site PERIPHERAL     Blood Culture No growth after 5 days of incubation.    Narrative:       From different peripheral sites, if not done within the last  24 hours (Per Hospital Policy: Only change specimen source to  \"Line\" if specified by physician order)    Blood Culture [[id number]] Collected:  11/09/18 1946    Order Status:  Completed Specimen:  Blood from Peripheral Updated:  11/14/18 2300     Significant Indicator NEG     Source BLD     Site PERIPHERAL     Blood Culture No growth after 5 days of incubation.    Narrative:       From different peripheral sites, if not done within the last  24 hours (Per Hospital Policy: Only change specimen source to  \"Line\" if specified by physician order)    " CULTURE WOUND W/ GRAM STAIN [[id number]]  (Abnormal)  (Susceptibility) Collected:  11/09/18 2022    Order Status:  Completed Specimen:  Wound from Left Leg Updated:  11/12/18 0821     Significant Indicator POS (POS)     Source WND     Site LEFT LEG     Culture Result Wound -- (A)     Gram Stain Result Rare Gram positive cocci.     Culture Result Wound Beta Hemolytic Streptococcus group A  Light growth   (A)      Methicillin Resistant Staphylococcus aureus  Light growth   (A)    Narrative:       CALL  Hernandez  141 tel. [phone],    Culture & Susceptibility     METHICILLIN RESISTANT STAPHYLOCOCCUS AUREUS     Antibiotic Sensitivity Microscan Unit Status    Ampicillin/sulbactam Resistant >16/8 mcg/mL Final    Method: SENSITIVITY, CATHERINE    Clindamycin Sensitive <=0.5 mcg/mL Final    Method: SENSITIVITY, CATHERINE    Daptomycin Sensitive 1 mcg/mL Final    Method: SENSITIVITY, CATHERINE    Erythromycin Resistant >4 mcg/mL Final    Method: SENSITIVITY, CATHERINE    Moxifloxacin Sensitive 1 mcg/mL Final    Method: SENSITIVITY, CATHERINE    Oxacillin Resistant >2 mcg/mL Final    Method: SENSITIVITY, CATHERINE    Penicillin Resistant >8 mcg/mL Final    Method: SENSITIVITY, CATHERINE    Tetracycline Sensitive <=4 mcg/mL Final    Method: SENSITIVITY, CATHERINE    Trimeth/Sulfa Sensitive <=0.5/9.5 mcg/mL Final    Method: SENSITIVITY, CATHERINE    Vancomycin Sensitive 2 mcg/mL Final    Method: SENSITIVITY, CATHERINE                       GRAM STAIN [[id number]] Collected:  11/09/18 2022    Order Status:  Completed Specimen:  Wound Updated:  11/10/18 0916     Significant Indicator .     Source WND     Site LEFT LEG     Gram Stain Result Rare Gram positive cocci.    Urinalysis [[id number]] Collected:  11/09/18 0000    Order Status:  Canceled Specimen:  Urine     Culture Respiratory W/ GRM STN [[id number]] Collected:  11/09/18 0000    Order Status:  Canceled Specimen:  Sputum from Sputum         Blood Culture   Date Value Ref Range Status   11/09/2018 No growth after 5 days of incubation.   Final        Studies:  Ct-head W/o    Result Date: 11/15/2018   CT HEAD WITHOUT CONTRAST 11/15/2018 3:24 PM HISTORY/REASON FOR EXAM:  Altered Mental Status. TECHNIQUE/EXAM DESCRIPTION AND NUMBER OF VIEWS: CT of the head without contrast. The study was performed on a helical multidetector CT scanner. Contiguous 2.5 mm axial sections were obtained from the skull base through the vertex. Up to date radiation dose reduction adjustments have been utilized to meet ALARA standards for radiation dose reduction. COMPARISON:  8/25/2018 FINDINGS: Lateral ventricles are normal in size and symmetric. Mild global parenchymal atrophy without focality. Chronic small vessel ischemic changes. No significant mass effect or midline shift. Basal cisterns are patent. No evidence for intracranial hemorrhage. Calvaria are intact. Atherosclerosis. Visualized orbits are unremarkable. Visualized mastoid air cells are clear. Mild mucosal thickening in the frontal nasal duct. Otherwise, visualized paranasal sinuses are unremarkable.     1.  No CT evidence of acute infarct, hemorrhage or mass. 2.  Global parenchymal atrophy and chronic small vessel ischemic changes.    Nx-opdn-kuebreeue (w/o Cxr)    Result Date: 10/24/2018  10/24/2018 10:38 PM HISTORY/REASON FOR EXAM:  Chest pain. TECHNIQUE/EXAM DESCRIPTION AND NUMBER OF VIEWS:  Eight views of the bilateral ribs. COMPARISON: Chest x-ray 8/22/2018 FINDINGS: There are acute fractures of the anterior ends of the left ninth and 10th ribs. The ribs are otherwise unremarkable. No pneumothorax or obvious pleural effusion.     Left eighth and ninth rib fractures as noted above.    Dx-tibia And Fibula Left    Result Date: 10/24/2018  10/24/2018 2:29 PM HISTORY/REASON FOR EXAM:  Pain/Deformity Following Trauma Lower leg wound. TECHNIQUE/EXAM DESCRIPTION AND NUMBER OF VIEWS: 2 views of the LEFT tibia and fibula. COMPARISON:  None FINDINGS:  Acute fracture or malalignment. The knee and ankle joints appear  grossly intact. No bony destruction is appreciated. There are arterial calcifications.     No bony destruction to suggest osteomyelitis.    Dx-femur-2+ Left    Result Date: 10/24/2018  10/24/2018 2:29 PM HISTORY/REASON FOR EXAM:  Left lower leg wound. . TECHNIQUE/EXAM DESCRIPTION AND NUMBER OF VIEWS:  2 views of the LEFT femur. COMPARISON: None FINDINGS: No acute fracture or malalignment. The knee and hip joints appear grossly intact. No bony destruction is identified. There are arterial calcifications.     1.  No radiographic evidence to suggest osteomyelitis. 2.  Atherosclerosis   IMPRESSION:     Chronic left leg wound  Chronic alcohol abuse  MRSA colonization  Tobacco abuse  Noncompliance    PLAN:   Bola Varela is a 63 y.o. male with chronic alcohol abuse and left leg wound from necrotizing fasciitis.  There is significant issues with alcohol and tobacco abuse.  The wound itself does not look infected.  It is colonized with MRSA.  Give a short course of p.o. Bactrim.  Wound care consultation.  I have reviewed all the records.      Discussed with IM. Will continue to follow    So Vieyra M.D.

## 2018-11-16 NOTE — PROGRESS NOTES
Pt. Impulsive but AAOx4 this AM. Up to bathroom with standby assist. Report called to EKATERINA Petersen pt. To transfer to [phone].

## 2018-11-17 VITALS
TEMPERATURE: 98.4 F | BODY MASS INDEX: 19.51 KG/M2 | SYSTOLIC BLOOD PRESSURE: 107 MMHG | HEIGHT: 70 IN | DIASTOLIC BLOOD PRESSURE: 71 MMHG | RESPIRATION RATE: 20 BRPM | HEART RATE: 82 BPM | WEIGHT: 136.24 LBS | OXYGEN SATURATION: 98 %

## 2018-11-17 PROCEDURE — 700102 HCHG RX REV CODE 250 W/ 637 OVERRIDE(OP): Performed by: INTERNAL MEDICINE

## 2018-11-17 PROCEDURE — A9270 NON-COVERED ITEM OR SERVICE: HCPCS | Performed by: HOSPITALIST

## 2018-11-17 PROCEDURE — 700111 HCHG RX REV CODE 636 W/ 250 OVERRIDE (IP): Performed by: HOSPITALIST

## 2018-11-17 PROCEDURE — A9270 NON-COVERED ITEM OR SERVICE: HCPCS | Performed by: INTERNAL MEDICINE

## 2018-11-17 PROCEDURE — A9270 NON-COVERED ITEM OR SERVICE: HCPCS | Performed by: FAMILY MEDICINE

## 2018-11-17 PROCEDURE — 700102 HCHG RX REV CODE 250 W/ 637 OVERRIDE(OP): Performed by: FAMILY MEDICINE

## 2018-11-17 PROCEDURE — 99239 HOSP IP/OBS DSCHRG MGMT >30: CPT | Performed by: HOSPITALIST

## 2018-11-17 PROCEDURE — 700102 HCHG RX REV CODE 250 W/ 637 OVERRIDE(OP): Performed by: HOSPITALIST

## 2018-11-17 RX ORDER — SULFAMETHOXAZOLE AND TRIMETHOPRIM 800; 160 MG/1; MG/1
1 TABLET ORAL EVERY 12 HOURS
Qty: 10 TAB | Refills: 0 | Status: SHIPPED | OUTPATIENT
Start: 2018-11-17 | End: 2018-11-22

## 2018-11-17 RX ADMIN — HYDROCODONE BITARTRATE AND ACETAMINOPHEN 1 TABLET: 5; 325 TABLET ORAL at 12:19

## 2018-11-17 RX ADMIN — STANDARDIZED SENNA CONCENTRATE AND DOCUSATE SODIUM 2 TABLET: 8.6; 5 TABLET, FILM COATED ORAL at 06:14

## 2018-11-17 RX ADMIN — HYDROCODONE BITARTRATE AND ACETAMINOPHEN 1 TABLET: 5; 325 TABLET ORAL at 06:14

## 2018-11-17 RX ADMIN — THIAMINE HCL TAB 100 MG 100 MG: 100 TAB at 06:14

## 2018-11-17 RX ADMIN — SULFAMETHOXAZOLE AND TRIMETHOPRIM 1 TABLET: 800; 160 TABLET ORAL at 06:14

## 2018-11-17 RX ADMIN — LISINOPRIL 5 MG: 5 TABLET ORAL at 06:14

## 2018-11-17 RX ADMIN — ACETAMINOPHEN 650 MG: 325 TABLET, FILM COATED ORAL at 06:14

## 2018-11-17 RX ADMIN — ENOXAPARIN SODIUM 40 MG: 100 INJECTION SUBCUTANEOUS at 06:14

## 2018-11-17 ASSESSMENT — PAIN SCALES - GENERAL
PAINLEVEL_OUTOF10: 5
PAINLEVEL_OUTOF10: 8
PAINLEVEL_OUTOF10: 6

## 2018-11-17 ASSESSMENT — ENCOUNTER SYMPTOMS
ORTHOPNEA: 0
NAUSEA: 0
CHILLS: 0
ABDOMINAL PAIN: 0
NERVOUS/ANXIOUS: 1
PALPITATIONS: 0
VOMITING: 0

## 2018-11-17 NOTE — PROGRESS NOTES
[facility] Medicine Daily Progress Note    Date of Service  11/17/2018    Chief Complaint  63 y.o. male admitted 11/9/2018 with non-healing wound    Hospital Course    Mr Varela has a history of necrotizing fascitis that was treated at [place] in May.  Patient has a wound that has been slow to heal, he is followed by outpatient wound care.  He was admitted with suspected infection and developed severe agitation.  He was transferred to ICU, delerium has improved after benzodiazepines were stopped.  Transfer to medical floor on 11/16/2018, still having some scant oozing from bandage and required additional wound care.      Interval Problem Update  Patient is cooperative and appropriate during my encounter, he has been impulsive  His leg hurts, about the same as yesterday, better when he keeps his leg up  He noticed some bruising this morning, some pain in extremity  No fevers or chills, tolerating bactrim, no N/V, no rash    Discussed case with RN and case management at length this morning    Consultants/Specialty  Psychiatry    Code Status  Full Code    Disposition  Anticipate discharge to shelter tomorrow    Review of Systems  Review of Systems   Constitutional: Negative for chills and malaise/fatigue.   Cardiovascular: Negative for chest pain, palpitations and orthopnea.   Gastrointestinal: Negative for abdominal pain, nausea and vomiting.   Skin: Negative for itching and rash.   Psychiatric/Behavioral: The patient is nervous/anxious.         Physical Exam  Temp:  [36.3 °C (97.4 °F)-36.9 °C (98.4 °F)] 36.9 °C (98.4 °F)  Pulse:  [81-89] 82  Resp:  [16-20] 20  BP: (107-117)/(71-73) 107/71    Physical Exam   Constitutional: He is oriented to person, place, and time. He appears well-developed and well-nourished.   HENT:   Head: Normocephalic and atraumatic.   Eyes: Conjunctivae and EOM are normal. Right eye exhibits no discharge. Left eye exhibits no discharge. No scleral icterus.   Neck: Normal range of motion. Neck  supple.   Cardiovascular: Normal rate, regular rhythm and intact distal pulses.    No murmur heard.  2+ Radial Pulses  Brisk Capillary Refill   Pulmonary/Chest: Effort normal and breath sounds normal. No respiratory distress. He has no wheezes.   Abdominal: Soft. Bowel sounds are normal. He exhibits no distension. There is no tenderness. There is no rebound.   Musculoskeletal: Normal range of motion. He exhibits no edema.   Left leg with post surgical wound  Large area, clean appearing wound without purulence or obvious fluctuance   Neurological: He is alert and oriented to person, place, and time. No cranial nerve deficit.   Skin: Skin is warm and dry. He is not diaphoretic. No erythema.   Psychiatric: He has a normal mood and affect.       Fluids    Intake/Output Summary (Last 24 hours) at 11/17/18 1145  Last data filed at 11/16/18 1605   Gross per 24 hour   Intake              840 ml   Output                0 ml   Net              840 ml       Laboratory  Recent Labs      11/15/18   0530   WBC  5.3   RBC  3.91*   HEMOGLOBIN  10.0*   HEMATOCRIT  33.3*   MCV  85.2   MCH  25.6*   MCHC  30.0*   RDW  56.4*   PLATELETCT  523*   MPV  9.3     Recent Labs      11/15/18   0530   SODIUM  138   POTASSIUM  3.9   CHLORIDE  105   CO2  22   GLUCOSE  100*   BUN  14   CREATININE  0.84   CALCIUM  9.2                   Imaging  CT-HEAD W/O   Final Result      1.  No CT evidence of acute infarct, hemorrhage or mass.   2.  Global parenchymal atrophy and chronic small vessel ischemic changes.           Assessment/Plan  * Sepsis (HCC)- (present on admission)   Assessment & Plan    This is sepsis (without associated acute organ dysfunction).   Source left lower extremity wound  Superficial culture indicates MRSA and beta Hemolytic Streptococcus group A   Sepsis is resolved     Delirium   Assessment & Plan    Patient had been in ICU potential alcohol withdrawal  CIWA protocol has been stopped  He is relatively cooperative and  appropriate  Would avoid additional benzodiazepines     Open wound of left lower leg- (present on admission)   Assessment & Plan    Nonhealing wound of the left lower leg  Superficial cultures are positive for MRSA and group A strep  There are no other signs of systemic infection  Appreciate ID consultation, continue Bactrim to complete 7 days  Still having scant oozing through bandages, needs additional wound care today       Hypokalemia- (present on admission)   Assessment & Plan    Replaced     Microcytic anemia- (present on admission)   Assessment & Plan    At his baseline.  No signs of active bleeding.  Continue to monitor.     Agitation- (present on admission)   Assessment & Plan    Delirium versus alcohol withdrawal  Now resolved     Essential hypertension- (present on admission)   Assessment & Plan    Lisinopril     Severe protein-calorie malnutrition (HCC)- (present on admission)   Assessment & Plan    Supplements     Debility   Assessment & Plan    PT OT evaluated patient, suspect now at baseline     Lactic acidosis- (present on admission)   Assessment & Plan    Resolved with fluid resuscitation     Tobacco use- (present on admission)   Assessment & Plan    Spent 7 minutes on tobacco cessation counseling including nicotine patches, gum, and dangers of smoking.    Also discussed options of nicotine patch, medical treatment with wellbutrin and chantix. Discussed the risks of smoking including increased risk of heart disease, stroke, cancer and COPD.     Discussed the benefits of quitting smoking.     Nicotine replacement protocol discussed with patient.  Patient not interested in stopping at this time although acknowledges that lack of funds severely limits his smoking habits.    19270 (smoking and tobacco cessation counseling visit; intermediate, greater than 3 minutes up to 10 minutes)            VTE prophylaxis: heparin

## 2018-11-17 NOTE — DISCHARGE PLANNING
Anticipated Discharge Disposition: Shelter     Action: LSW [facility] RX-Bactrim, LSW informed RX can be filled and is $0.00 copay. [facility], LSW placed RX-bactrim and bus pass in locked chart cabinet, bedside RN informed.     Barriers to Discharge: none     Plan: Pt provided bus pass and RX-bactrim, Pt to discharge to shelter when medically cleared     LSW to follow regarding any additional need for discharge     Care Transition Team Assessment    Information Source  Orientation : Oriented x 4  Information Given By: Patient    Readmission Evaluation  Is this a readmission?: No    Elopement Risk  Legal Hold: No  Ambulatory or Self Mobile in Wheelchair: Yes  Disoriented: No  Psychiatric Symptoms: Impulsivity-at Risk for Elopement  History of Wandering: Yes-at Risk for Elopement  Elopement this Admit: No  Vocalizing Wanting to Leave: Yes-At Risk for Elopement  Displays Behaviors, Body Language Wanting to Leave: Yes-at Risk for Elopement  Elopement Risk: At Risk for Elopement  Wanderguard On: Unavailable  Personal Belongings: Hospital Clothing Only    Interdisciplinary Discharge Planning  Does Admitting Nurse Feel This Could be a Complex Discharge?: Yes  Primary Care Physician: None  Lives with - Patient's Self Care Capacity: Other (Comments), Unable To Determine At This Time (homeless. Medical non-compliant)  Patient or legal guardian wants to designate a caregiver (see row info): No  Support Systems: None, Shelter  Housing / Facility: Homeless  Do You Take your Prescribed Medications Regularly: No  Reasons Why Not Taking Medications : Financial Reasons, Didn't Refill Prescriptions   Able to Return to Previous ADL's: Future Time w/Therapy  Mobility Issues: No  Prior Services: Unable To Determine At This Time  Patient Expects to be Discharged to:: Unknown  Assistance Needed: Unknown at this Time  Durable Medical Equipment: Not  Applicable    Discharge Preparedness  What is your plan after discharge?:  (shelter)  Prior Functional Level: Independent with Activities of Daily Living    Functional Assesment  Prior Functional Level: Independent with Activities of Daily Living    Finances  Financial Barriers to Discharge: No  Prescription Coverage: Yes    Vision / Hearing Impairment  Right Eye Vision: Wears Glasses  Left Eye Vision: Wears Glasses    Values / Beliefs / Concerns  Values / Beliefs Concerns : No  Special Hospitalization Concerns: None         Domestic Abuse  Have you ever been the victim of abuse or violence?: No  Physical Abuse or Sexual Abuse: No  Verbal Abuse or Emotional Abuse: No  Possible Abuse Reported to:: Not Applicable    Psychological Assessment  History of Substance Abuse: Marijuana    Discharge Risks or Barriers  Discharge risks or barriers?: Homeless / couch surfing  Patient risk factors: Homeless    Anticipated Discharge Information  Anticipated discharge disposition: Shelter

## 2018-11-17 NOTE — DISCHARGE SUMMARY
Discharge Summary    CHIEF COMPLAINT ON ADMISSION  Chief Complaint   Patient presents with   • Wound Check     pt reports walking on trial and ground gave out and cut open left leg, insident happened in May.  from mid thigh to ankle, dried drainage to dressing, swelling noted. pt reports pain to site.       Reason for Admission  EMS     Admission Date  11/9/2018    CODE STATUS  Full Code    HPI & HOSPITAL COURSE    Mr Varela has a history of necrotizing fascitis that was treated at [place] in May.  Patient has a wound that has been slow to heal, he is followed by outpatient wound care.  He was admitted with suspected infection and developed severe agitation.  He was transferred to ICU, delerium has improved after benzodiazepines were stopped.  Transfer to medical floor on 11/16/2018, still having some scant oozing from bandage and required additional wound care.     Told patient he will be discharged to shelter he became upset.  Ultimately left AMA without antibiotics.  I have asked charge RN to contact shelter to inform us if he does show up there, we can bring him his antibiotics at that time.    The patient met 2-midnight criteria for an inpatient stay at the time of discharge.    Discharge Date  11/17/2018    FOLLOW UP ITEMS POST DISCHARGE  N/A    DISCHARGE DIAGNOSES  Principal Problem:    Sepsis (HCC) POA: Yes  Active Problems:    Open wound of left lower leg POA: Yes    Delirium POA: Unknown    Microcytic anemia POA: Yes    Hypokalemia POA: Yes    Tobacco use POA: Yes    Lactic acidosis POA: Yes    Debility POA: Unknown    Severe protein-calorie malnutrition (HCC) POA: Yes    Essential hypertension POA: Yes    Agitation POA: Yes  Resolved Problems:    * No resolved hospital problems. *      FOLLOW UP  No future appointments.  No follow-up provider specified.    MEDICATIONS ON DISCHARGE     Medication List      CONTINUE taking these medications      Instructions   sulfamethoxazole-trimethoprim 800-160 MG  tablet  Commonly known as:  BACTRIM DS   Take 1 Tab by mouth every 12 hours for 5 days.  Dose:  1 Tab        STOP taking these medications    amoxicillin-clavulanate 875-125 MG Tabs  Commonly known as:  AUGMENTIN            Allergies  No Known Allergies    DIET  Orders Placed This Encounter   Procedures   • Diet Order Regular     Standing Status:   Standing     Number of Occurrences:   1     Order Specific Question:   Diet:     Answer:   Regular [1]       ACTIVITY  As tolerated.  Weight bearing as tolerated    LABORATORY  Lab Results   Component Value Date    SODIUM 138 11/15/2018    POTASSIUM 3.9 11/15/2018    CHLORIDE 105 11/15/2018    CO2 22 11/15/2018    GLUCOSE 100 (H) 11/15/2018    BUN 14 11/15/2018    CREATININE 0.84 11/15/2018        Lab Results   Component Value Date    WBC 5.3 11/15/2018    HEMOGLOBIN 10.0 (L) 11/15/2018    HEMATOCRIT 33.3 (L) 11/15/2018    PLATELETCT 523 (H) 11/15/2018        Total time of the discharge process exceeds 35 minutes.

## 2018-11-17 NOTE — PROGRESS NOTES
"Pt last rounded on at 1219 when he was medicated for pain. No verbalization of wanting to leave or sign out AMA at anytime during the day. Pt instructed at the beginning of the shift to remain in the room and not to leave the floor.    Primary nurse made aware by the pt's room mate that he stated \"I'm going to go stretch my legs\". Pt noted not to be on his side of the bed or anywhere on the floor. Charge nurse Tania called and made aware. Tania Called security and a code purple was called. Code purple was canceled after several minutes and security states the pt was seen leaving the hospital with bags in his hands. Pt did have a PIV in and security states they will handle the arrangements for the well fair check on the pt. Dr Worley made aware that the pt had left the hospital at 1330.    [employer] [employer] to floor to take report, Security, NAM and charge nurse present. Written report given to office regarding incident.  "

## 2018-11-17 NOTE — PROGRESS NOTES
[facility] Medicine Daily Progress Note    Date of Service  11/16/2018    Chief Complaint  63 y.o. male admitted 11/9/2018 with non-healing wound    Hospital Course    Mr Varela has a history of necrotizing fascitis that was treated at [place] in May.  Patient has a wound that has been slow to heal, he is followed by outpatient wound care.  He was admitted with suspected infection and developed severe agitation.  He was transferred to ICU, delerium has improved after benzodiazepines were stopped.      Interval Problem Update  Patient is cooperative and appropriate during my encounter, he has been impulsive  His leg hurts, about the same as yesterday, better when he keeps his leg up  No fevers or chills, tolerating bactrim, no N/V, no rash    Consultants/Specialty  Psychiatry    Code Status  Full Code    Disposition  OK to transfer out of ICU    Review of Systems  Review of Systems   Constitutional: Negative for chills and malaise/fatigue.   Cardiovascular: Negative for chest pain, palpitations and orthopnea.   Gastrointestinal: Negative for abdominal pain, nausea and vomiting.   Skin: Negative for itching and rash.   Psychiatric/Behavioral: The patient is nervous/anxious.         Physical Exam  Temp:  [36.4 °C (97.5 °F)-37.2 °C (99 °F)] 37.2 °C (99 °F)  Pulse:  [68-96] 83  Resp:  [16-20] 16  BP: (114-165)/(73-90) 114/73    Physical Exam   Constitutional: He is oriented to person, place, and time. He appears well-developed and well-nourished.   HENT:   Head: Normocephalic and atraumatic.   Eyes: Conjunctivae and EOM are normal. Right eye exhibits no discharge. Left eye exhibits no discharge. No scleral icterus.   Neck: Normal range of motion. Neck supple.   Cardiovascular: Normal rate, regular rhythm and intact distal pulses.    No murmur heard.  2+ Radial Pulses  Brisk Capillary Refill   Pulmonary/Chest: Effort normal and breath sounds normal. No respiratory distress. He has no wheezes.   Abdominal: Soft. Bowel  sounds are normal. He exhibits no distension. There is no tenderness. There is no rebound.   Musculoskeletal: Normal range of motion. He exhibits no edema.   Left leg with post surgical wound  Large area, clean appearing wound without purulence or obvious fluctuance   Neurological: He is alert and oriented to person, place, and time. No cranial nerve deficit.   Skin: Skin is warm and dry. He is not diaphoretic. No erythema.   Psychiatric: He has a normal mood and affect.       Fluids    Intake/Output Summary (Last 24 hours) at 11/16/18 1721  Last data filed at 11/16/18 1320   Gross per 24 hour   Intake              600 ml   Output                0 ml   Net              600 ml       Laboratory  Recent Labs      11/14/18   0455  11/15/18   0530   WBC  5.7  5.3   RBC  3.94*  3.91*   HEMOGLOBIN  9.9*  10.0*   HEMATOCRIT  33.1*  33.3*   MCV  84.0  85.2   MCH  25.1*  25.6*   MCHC  29.9*  30.0*   RDW  54.8*  56.4*   PLATELETCT  477*  523*   MPV  9.0  9.3     Recent Labs      11/14/18   0455  11/15/18   0530   SODIUM  135  138   POTASSIUM  4.4  3.9   CHLORIDE  104  105   CO2  23  22   GLUCOSE  95  100*   BUN  14  14   CREATININE  0.95  0.84   CALCIUM  9.1  9.2                   Imaging  CT-HEAD W/O   Final Result      1.  No CT evidence of acute infarct, hemorrhage or mass.   2.  Global parenchymal atrophy and chronic small vessel ischemic changes.           Assessment/Plan  * Sepsis (HCC)   Assessment & Plan    This is sepsis (without associated acute organ dysfunction).   Source left lower extremity wound  Superficial culture indicates MRSA and beta Hemolytic Streptococcus group A   Sepsis is resolved     Delirium   Assessment & Plan    Patient tells me he has not been drinking for some time due to lack of funds  Admittedly he is not especially reliable historian  I suspect his altered mental status is more delirium than alcohol withdrawal  CIWA protocol has been stopped  He is relatively cooperative and  appropriate  Would avoid additional benzodiazepines     Open wound of left lower leg- (present on admission)   Assessment & Plan    Nonhealing wound of the left lower leg  Superficial cultures are positive for MRSA and group A strep  There are no other signs of systemic infection  Appreciate ID consultation, continue Bactrim       Hypokalemia- (present on admission)   Assessment & Plan    Replaced     Microcytic anemia- (present on admission)   Assessment & Plan    At his baseline.  No signs of active bleeding.  Continue to monitor.     Essential hypertension   Assessment & Plan    Lisinopril     Severe protein-calorie malnutrition (HCC)   Assessment & Plan    Supplements     Debility   Assessment & Plan    PT/OT to eval      Lactic acidosis   Assessment & Plan    Resolved with fluid resuscitation     Tobacco use- (present on admission)   Assessment & Plan    Cessation recommended  Nicotine patch          VTE prophylaxis: heparin

## 2018-11-18 ENCOUNTER — HOSPITAL ENCOUNTER (EMERGENCY)
Dept: HOSPITAL 8 - ED | Age: 63
Discharge: HOME | End: 2018-11-18
Payer: MEDICAID

## 2018-11-18 VITALS — HEIGHT: 71 IN | WEIGHT: 149.91 LBS | BODY MASS INDEX: 20.99 KG/M2

## 2018-11-18 VITALS — SYSTOLIC BLOOD PRESSURE: 128 MMHG | DIASTOLIC BLOOD PRESSURE: 75 MMHG

## 2018-11-18 DIAGNOSIS — M79.605: Primary | ICD-10-CM

## 2018-11-18 DIAGNOSIS — I10: ICD-10-CM

## 2018-11-18 DIAGNOSIS — Z00.00: ICD-10-CM

## 2018-11-18 PROCEDURE — 99283 EMERGENCY DEPT VISIT LOW MDM: CPT

## 2018-11-19 ENCOUNTER — HOSPITAL ENCOUNTER (EMERGENCY)
Dept: HOSPITAL 8 - ED | Age: 63
Discharge: HOME | End: 2018-11-19
Payer: MEDICAID

## 2018-11-19 VITALS — DIASTOLIC BLOOD PRESSURE: 75 MMHG | SYSTOLIC BLOOD PRESSURE: 136 MMHG

## 2018-11-19 VITALS — WEIGHT: 147.93 LBS | BODY MASS INDEX: 25.25 KG/M2 | HEIGHT: 64 IN

## 2018-11-19 DIAGNOSIS — Z48.00: Primary | ICD-10-CM

## 2018-11-19 DIAGNOSIS — I10: ICD-10-CM

## 2018-11-19 PROCEDURE — 99283 EMERGENCY DEPT VISIT LOW MDM: CPT

## 2018-11-19 NOTE — ED NOTES
All lines and monitors discontinued. Discharge instructions given, questions answered.    Bola out of ER, escorted by self.   
Patient now denies abdominal pain but endorses knee pain, neck pain.   
Pt has large wound to back of leg and vague symptoms, now denies abd pain  
Wound dressing changed prior to discharge  
EMS

## 2018-11-25 ENCOUNTER — HOSPITAL ENCOUNTER (INPATIENT)
Dept: HOSPITAL 8 - ED | Age: 63
LOS: 3 days | Discharge: LEFT BEFORE DISCHARGE | DRG: 603 | End: 2018-11-28
Attending: HOSPITALIST | Admitting: HOSPITALIST
Payer: MEDICAID

## 2018-11-25 VITALS — HEIGHT: 71 IN | WEIGHT: 154.1 LBS | BODY MASS INDEX: 21.57 KG/M2

## 2018-11-25 VITALS — DIASTOLIC BLOOD PRESSURE: 82 MMHG | SYSTOLIC BLOOD PRESSURE: 148 MMHG

## 2018-11-25 DIAGNOSIS — D50.9: ICD-10-CM

## 2018-11-25 DIAGNOSIS — L03.116: Primary | ICD-10-CM

## 2018-11-25 DIAGNOSIS — Z82.49: ICD-10-CM

## 2018-11-25 DIAGNOSIS — E63.9: ICD-10-CM

## 2018-11-25 DIAGNOSIS — Z87.440: ICD-10-CM

## 2018-11-25 DIAGNOSIS — Z83.3: ICD-10-CM

## 2018-11-25 DIAGNOSIS — Z87.891: ICD-10-CM

## 2018-11-25 DIAGNOSIS — G89.29: ICD-10-CM

## 2018-11-25 DIAGNOSIS — Z59.0: ICD-10-CM

## 2018-11-25 DIAGNOSIS — M54.9: ICD-10-CM

## 2018-11-25 DIAGNOSIS — F10.20: ICD-10-CM

## 2018-11-25 DIAGNOSIS — I10: ICD-10-CM

## 2018-11-25 LAB
<PLATELET ESTIMATE>: (no result)
<PLT MORPHOLOGY>: (no result)
ALBUMIN SERPL-MCNC: 2.8 G/DL (ref 3.4–5)
ANION GAP SERPL CALC-SCNC: 11 MMOL/L (ref 5–15)
ANISOCYTOSIS BLD QL SMEAR: (no result)
BAND#(MANUAL): 0.09 X10^3/UL
BASOPHILS NFR BLD MANUAL: 3 % (ref 0–1)
BASOS#(MANUAL): 0.26 X10^3/UL (ref 0–0.1)
CALCIUM SERPL-MCNC: 8.2 MG/DL (ref 8.5–10.1)
CHLORIDE SERPL-SCNC: 104 MMOL/L (ref 98–107)
CREAT SERPL-MCNC: 0.75 MG/DL (ref 0.7–1.3)
ERYTHROCYTE [DISTWIDTH] IN BLOOD BY AUTOMATED COUNT: 19.1 % (ref 9.4–14.8)
HYPOCHROMIA BLD QL SMEAR: (no result)
LYMPH#(MANUAL): 1.38 X10^3/UL (ref 1–3.4)
LYMPHS% (MANUAL): 16 % (ref 22–44)
MACROCYTES BLD QL SMEAR: (no result)
MCH RBC QN AUTO: 26.3 PG (ref 27.5–34.5)
MCHC RBC AUTO-ENTMCNC: 32.7 G/DL (ref 33.2–36.2)
MCV RBC AUTO: 80.3 FL (ref 81–97)
MD: YES
MICROCYTES BLD QL SMEAR: (no result)
MONOS#(MANUAL): 1.03 X10^3/UL (ref 0.3–2.7)
MONOS% (MANUAL): 12 % (ref 2–9)
NEUTS BAND NFR BLD: 1 % (ref 0–7)
OVALOCYTES BLD QL SMEAR: (no result)
PLATELET # BLD AUTO: 619 X10^3/UL (ref 130–400)
PMV BLD AUTO: 6.6 FL (ref 7.4–10.4)
RBC # BLD AUTO: 3.67 X10^6/UL (ref 4.38–5.82)
SEG#(MANUAL): 5.85 X10^3/UL (ref 1.8–6.8)
SEGS% (MANUAL): 68 % (ref 42–75)

## 2018-11-25 PROCEDURE — 82565 ASSAY OF CREATININE: CPT

## 2018-11-25 PROCEDURE — 83550 IRON BINDING TEST: CPT

## 2018-11-25 PROCEDURE — 84145 PROCALCITONIN (PCT): CPT

## 2018-11-25 PROCEDURE — 80048 BASIC METABOLIC PNL TOTAL CA: CPT

## 2018-11-25 PROCEDURE — 80069 RENAL FUNCTION PANEL: CPT

## 2018-11-25 PROCEDURE — 82040 ASSAY OF SERUM ALBUMIN: CPT

## 2018-11-25 PROCEDURE — 80202 ASSAY OF VANCOMYCIN: CPT

## 2018-11-25 PROCEDURE — 87040 BLOOD CULTURE FOR BACTERIA: CPT

## 2018-11-25 PROCEDURE — 84520 ASSAY OF UREA NITROGEN: CPT

## 2018-11-25 PROCEDURE — 83605 ASSAY OF LACTIC ACID: CPT

## 2018-11-25 PROCEDURE — 36415 COLL VENOUS BLD VENIPUNCTURE: CPT

## 2018-11-25 PROCEDURE — 85025 COMPLETE CBC W/AUTO DIFF WBC: CPT

## 2018-11-25 PROCEDURE — 83540 ASSAY OF IRON: CPT

## 2018-11-25 PROCEDURE — 87338 HPYLORI STOOL AG IA: CPT

## 2018-11-25 PROCEDURE — 90656 IIV3 VACC NO PRSV 0.5 ML IM: CPT

## 2018-11-25 PROCEDURE — 82728 ASSAY OF FERRITIN: CPT

## 2018-11-25 RX ADMIN — GABAPENTIN PRN MG: 300 CAPSULE ORAL at 20:21

## 2018-11-25 RX ADMIN — ACETAMINOPHEN PRN MG: 325 TABLET, FILM COATED ORAL at 20:21

## 2018-11-25 RX ADMIN — ENOXAPARIN SODIUM SCH MG: 40 INJECTION SUBCUTANEOUS at 20:22

## 2018-11-25 SDOH — ECONOMIC STABILITY - HOUSING INSECURITY: HOMELESSNESS: Z59.0

## 2018-11-26 VITALS — SYSTOLIC BLOOD PRESSURE: 150 MMHG | DIASTOLIC BLOOD PRESSURE: 71 MMHG

## 2018-11-26 VITALS — DIASTOLIC BLOOD PRESSURE: 82 MMHG | SYSTOLIC BLOOD PRESSURE: 135 MMHG

## 2018-11-26 VITALS — SYSTOLIC BLOOD PRESSURE: 136 MMHG | DIASTOLIC BLOOD PRESSURE: 87 MMHG

## 2018-11-26 LAB
% IRON SATURATION: 8 % (ref 20–55)
CREAT SERPL-MCNC: 0.75 MG/DL (ref 0.7–1.3)
IRON LEVEL: 22 MCG/DL (ref 65–175)
TIBC SERPL-MCNC: 289 MCG/DL (ref 250–450)

## 2018-11-26 RX ADMIN — AMPICILLIN SODIUM AND SULBACTAM SODIUM SCH MLS/HR: 2; 1 INJECTION, POWDER, FOR SOLUTION INTRAMUSCULAR; INTRAVENOUS at 18:29

## 2018-11-26 RX ADMIN — AMPICILLIN SODIUM AND SULBACTAM SODIUM SCH MLS/HR: 2; 1 INJECTION, POWDER, FOR SOLUTION INTRAMUSCULAR; INTRAVENOUS at 10:21

## 2018-11-26 RX ADMIN — ACETAMINOPHEN PRN MG: 325 TABLET, FILM COATED ORAL at 06:28

## 2018-11-26 RX ADMIN — ACETAMINOPHEN PRN MG: 325 TABLET, FILM COATED ORAL at 17:28

## 2018-11-26 RX ADMIN — AMPICILLIN SODIUM AND SULBACTAM SODIUM SCH MLS/HR: 2; 1 INJECTION, POWDER, FOR SOLUTION INTRAMUSCULAR; INTRAVENOUS at 02:33

## 2018-11-26 RX ADMIN — VANCOMYCIN HYDROCHLORIDE SCH MLS/HR: 1 INJECTION, POWDER, LYOPHILIZED, FOR SOLUTION INTRAVENOUS at 14:34

## 2018-11-26 RX ADMIN — ACETAMINOPHEN PRN MG: 325 TABLET, FILM COATED ORAL at 02:36

## 2018-11-26 RX ADMIN — ENOXAPARIN SODIUM SCH MG: 40 INJECTION SUBCUTANEOUS at 20:41

## 2018-11-26 RX ADMIN — ACETAMINOPHEN PRN MG: 325 TABLET, FILM COATED ORAL at 10:32

## 2018-11-27 VITALS — SYSTOLIC BLOOD PRESSURE: 142 MMHG | DIASTOLIC BLOOD PRESSURE: 92 MMHG

## 2018-11-27 VITALS — SYSTOLIC BLOOD PRESSURE: 152 MMHG | DIASTOLIC BLOOD PRESSURE: 78 MMHG

## 2018-11-27 VITALS — SYSTOLIC BLOOD PRESSURE: 150 MMHG | DIASTOLIC BLOOD PRESSURE: 92 MMHG

## 2018-11-27 VITALS — SYSTOLIC BLOOD PRESSURE: 146 MMHG | DIASTOLIC BLOOD PRESSURE: 86 MMHG

## 2018-11-27 VITALS — DIASTOLIC BLOOD PRESSURE: 75 MMHG | SYSTOLIC BLOOD PRESSURE: 140 MMHG

## 2018-11-27 RX ADMIN — AMPICILLIN SODIUM AND SULBACTAM SODIUM SCH MLS/HR: 2; 1 INJECTION, POWDER, FOR SOLUTION INTRAMUSCULAR; INTRAVENOUS at 02:05

## 2018-11-27 RX ADMIN — AMPICILLIN SODIUM AND SULBACTAM SODIUM SCH MLS/HR: 2; 1 INJECTION, POWDER, FOR SOLUTION INTRAMUSCULAR; INTRAVENOUS at 11:16

## 2018-11-27 RX ADMIN — AMPICILLIN SODIUM AND SULBACTAM SODIUM SCH MLS/HR: 2; 1 INJECTION, POWDER, FOR SOLUTION INTRAMUSCULAR; INTRAVENOUS at 20:19

## 2018-11-27 RX ADMIN — GABAPENTIN PRN MG: 300 CAPSULE ORAL at 20:30

## 2018-11-27 RX ADMIN — ACETAMINOPHEN PRN MG: 325 TABLET, FILM COATED ORAL at 17:32

## 2018-11-27 RX ADMIN — ACETAMINOPHEN PRN MG: 325 TABLET, FILM COATED ORAL at 08:26

## 2018-11-27 RX ADMIN — ENOXAPARIN SODIUM SCH MG: 40 INJECTION SUBCUTANEOUS at 20:30

## 2018-11-27 RX ADMIN — GABAPENTIN PRN MG: 300 CAPSULE ORAL at 11:19

## 2018-11-27 RX ADMIN — VANCOMYCIN HYDROCHLORIDE SCH MLS/HR: 1 INJECTION, POWDER, LYOPHILIZED, FOR SOLUTION INTRAVENOUS at 08:26

## 2018-11-28 VITALS — SYSTOLIC BLOOD PRESSURE: 145 MMHG | DIASTOLIC BLOOD PRESSURE: 72 MMHG

## 2018-11-28 LAB
ALBUMIN SERPL-MCNC: 2.4 G/DL (ref 3.4–5)
ANION GAP SERPL CALC-SCNC: 10 MMOL/L (ref 5–15)
CALCIUM SERPL-MCNC: 8.2 MG/DL (ref 8.5–10.1)
CHLORIDE SERPL-SCNC: 105 MMOL/L (ref 98–107)
CREAT SERPL-MCNC: 0.82 MG/DL (ref 0.7–1.3)

## 2018-11-28 RX ADMIN — VANCOMYCIN HYDROCHLORIDE SCH MLS/HR: 1 INJECTION, POWDER, LYOPHILIZED, FOR SOLUTION INTRAVENOUS at 02:51

## 2018-11-28 RX ADMIN — AMPICILLIN SODIUM AND SULBACTAM SODIUM SCH MLS/HR: 2; 1 INJECTION, POWDER, FOR SOLUTION INTRAMUSCULAR; INTRAVENOUS at 02:08

## 2018-11-29 ENCOUNTER — HOSPITAL ENCOUNTER (INPATIENT)
Dept: HOSPITAL 8 - ED | Age: 63
LOS: 5 days | Discharge: HOME | DRG: 603 | End: 2018-12-04
Attending: HOSPITALIST | Admitting: HOSPITALIST
Payer: MEDICAID

## 2018-11-29 VITALS — SYSTOLIC BLOOD PRESSURE: 163 MMHG | DIASTOLIC BLOOD PRESSURE: 94 MMHG

## 2018-11-29 VITALS — WEIGHT: 148.37 LBS | HEIGHT: 71 IN | BODY MASS INDEX: 20.77 KG/M2

## 2018-11-29 DIAGNOSIS — F10.20: ICD-10-CM

## 2018-11-29 DIAGNOSIS — Z59.0: ICD-10-CM

## 2018-11-29 DIAGNOSIS — E44.1: ICD-10-CM

## 2018-11-29 DIAGNOSIS — L03.116: Primary | ICD-10-CM

## 2018-11-29 DIAGNOSIS — F17.200: ICD-10-CM

## 2018-11-29 DIAGNOSIS — D47.3: ICD-10-CM

## 2018-11-29 DIAGNOSIS — Z71.6: ICD-10-CM
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
## 2018-11-29 DIAGNOSIS — Z82.49: ICD-10-CM

## 2018-11-29 DIAGNOSIS — Z71.41: ICD-10-CM

## 2018-11-29 DIAGNOSIS — Z91.14: ICD-10-CM

## 2018-11-29 DIAGNOSIS — E87.6: ICD-10-CM

## 2018-11-29 DIAGNOSIS — I10: ICD-10-CM

## 2018-11-29 LAB
ALBUMIN SERPL-MCNC: 3.3 G/DL (ref 3.4–5)
ANION GAP SERPL CALC-SCNC: 13 MMOL/L (ref 5–15)
BASOPHILS # BLD AUTO: 0.03 X10^3/UL (ref 0–0.1)
BASOPHILS NFR BLD AUTO: 0 % (ref 0–1)
CALCIUM SERPL-MCNC: 8.5 MG/DL (ref 8.5–10.1)
CHLORIDE SERPL-SCNC: 105 MMOL/L (ref 98–107)
CREAT SERPL-MCNC: 0.78 MG/DL (ref 0.7–1.3)
EOSINOPHIL # BLD AUTO: 0.05 X10^3/UL (ref 0–0.4)
EOSINOPHIL NFR BLD AUTO: 1 % (ref 1–7)
ERYTHROCYTE [DISTWIDTH] IN BLOOD BY AUTOMATED COUNT: 19.2 % (ref 9.4–14.8)
LYMPHOCYTES # BLD AUTO: 1.48 X10^3/UL (ref 1–3.4)
LYMPHOCYTES NFR BLD AUTO: 16 % (ref 22–44)
MCH RBC QN AUTO: 26.3 PG (ref 27.5–34.5)
MCHC RBC AUTO-ENTMCNC: 32.5 G/DL (ref 33.2–36.2)
MCV RBC AUTO: 81 FL (ref 81–97)
MD: NO
MONOCYTES # BLD AUTO: 0.72 X10^3/UL (ref 0.2–0.8)
MONOCYTES NFR BLD AUTO: 8 % (ref 2–9)
NEUTROPHILS # BLD AUTO: 6.8 X10^3/UL (ref 1.8–6.8)
NEUTROPHILS NFR BLD AUTO: 75 % (ref 42–75)
PLATELET # BLD AUTO: 766 X10^3/UL (ref 130–400)
PMV BLD AUTO: 6.8 FL (ref 7.4–10.4)
RBC # BLD AUTO: 4.21 X10^6/UL (ref 4.38–5.82)

## 2018-11-29 PROCEDURE — 87205 SMEAR GRAM STAIN: CPT

## 2018-11-29 PROCEDURE — 87186 SC STD MICRODIL/AGAR DIL: CPT

## 2018-11-29 PROCEDURE — 83605 ASSAY OF LACTIC ACID: CPT

## 2018-11-29 PROCEDURE — 80048 BASIC METABOLIC PNL TOTAL CA: CPT

## 2018-11-29 PROCEDURE — 96365 THER/PROPH/DIAG IV INF INIT: CPT

## 2018-11-29 PROCEDURE — 36415 COLL VENOUS BLD VENIPUNCTURE: CPT

## 2018-11-29 PROCEDURE — 80307 DRUG TEST PRSMV CHEM ANLYZR: CPT

## 2018-11-29 PROCEDURE — 87077 CULTURE AEROBIC IDENTIFY: CPT

## 2018-11-29 PROCEDURE — 82040 ASSAY OF SERUM ALBUMIN: CPT

## 2018-11-29 PROCEDURE — 85651 RBC SED RATE NONAUTOMATED: CPT

## 2018-11-29 PROCEDURE — 87040 BLOOD CULTURE FOR BACTERIA: CPT

## 2018-11-29 PROCEDURE — 87070 CULTURE OTHR SPECIMN AEROBIC: CPT

## 2018-11-29 PROCEDURE — 87147 CULTURE TYPE IMMUNOLOGIC: CPT

## 2018-11-29 PROCEDURE — 85025 COMPLETE CBC W/AUTO DIFF WBC: CPT

## 2018-11-29 PROCEDURE — 80202 ASSAY OF VANCOMYCIN: CPT

## 2018-11-29 PROCEDURE — 86140 C-REACTIVE PROTEIN: CPT

## 2018-11-29 PROCEDURE — 87106 FUNGI IDENTIFICATION YEAST: CPT

## 2018-11-29 PROCEDURE — 84145 PROCALCITONIN (PCT): CPT

## 2018-11-29 RX ADMIN — ENOXAPARIN SODIUM SCH MG: 40 INJECTION SUBCUTANEOUS at 15:00

## 2018-11-29 RX ADMIN — AMPICILLIN SODIUM AND SULBACTAM SODIUM SCH MLS/HR: 2; 1 INJECTION, POWDER, FOR SOLUTION INTRAMUSCULAR; INTRAVENOUS at 17:30

## 2018-11-29 RX ADMIN — AMPICILLIN SODIUM AND SULBACTAM SODIUM SCH MLS/HR: 2; 1 INJECTION, POWDER, FOR SOLUTION INTRAMUSCULAR; INTRAVENOUS at 21:35

## 2018-11-29 RX ADMIN — FERROUS SULFATE TAB 325 MG (65 MG ELEMENTAL FE) SCH MG: 325 (65 FE) TAB at 17:30

## 2018-11-29 SDOH — ECONOMIC STABILITY - HOUSING INSECURITY: HOMELESSNESS: Z59.0

## 2018-11-30 VITALS — SYSTOLIC BLOOD PRESSURE: 164 MMHG | DIASTOLIC BLOOD PRESSURE: 96 MMHG

## 2018-11-30 VITALS — DIASTOLIC BLOOD PRESSURE: 84 MMHG | SYSTOLIC BLOOD PRESSURE: 167 MMHG

## 2018-11-30 VITALS — DIASTOLIC BLOOD PRESSURE: 97 MMHG | SYSTOLIC BLOOD PRESSURE: 148 MMHG

## 2018-11-30 VITALS — DIASTOLIC BLOOD PRESSURE: 81 MMHG | SYSTOLIC BLOOD PRESSURE: 175 MMHG

## 2018-11-30 VITALS — SYSTOLIC BLOOD PRESSURE: 169 MMHG | DIASTOLIC BLOOD PRESSURE: 100 MMHG

## 2018-11-30 LAB
ANION GAP SERPL CALC-SCNC: 8 MMOL/L (ref 5–15)
BASOPHILS # BLD AUTO: 0.1 X10^3/UL (ref 0–0.1)
BASOPHILS NFR BLD AUTO: 1 % (ref 0–1)
CALCIUM SERPL-MCNC: 8.4 MG/DL (ref 8.5–10.1)
CHLORIDE SERPL-SCNC: 106 MMOL/L (ref 98–107)
CREAT SERPL-MCNC: 0.78 MG/DL (ref 0.7–1.3)
EOSINOPHIL # BLD AUTO: 0.07 X10^3/UL (ref 0–0.4)
EOSINOPHIL NFR BLD AUTO: 1 % (ref 1–7)
ERYTHROCYTE [DISTWIDTH] IN BLOOD BY AUTOMATED COUNT: 19.4 % (ref 9.4–14.8)
LYMPHOCYTES # BLD AUTO: 1.26 X10^3/UL (ref 1–3.4)
LYMPHOCYTES NFR BLD AUTO: 15 % (ref 22–44)
MCH RBC QN AUTO: 26.6 PG (ref 27.5–34.5)
MCHC RBC AUTO-ENTMCNC: 32.4 G/DL (ref 33.2–36.2)
MCV RBC AUTO: 82 FL (ref 81–97)
MD: (no result)
MONOCYTES # BLD AUTO: 1.47 X10^3/UL (ref 0.2–0.8)
MONOCYTES NFR BLD AUTO: 17 % (ref 2–9)
NEUTROPHILS # BLD AUTO: 5.53 X10^3/UL (ref 1.8–6.8)
NEUTROPHILS NFR BLD AUTO: 66 % (ref 42–75)
PLATELET # BLD AUTO: 653 X10^3/UL (ref 130–400)
PMV BLD AUTO: 7.2 FL (ref 7.4–10.4)
RBC # BLD AUTO: 3.96 X10^6/UL (ref 4.38–5.82)

## 2018-11-30 RX ADMIN — FOLIC ACID SCH MG: 1 TABLET ORAL at 08:26

## 2018-11-30 RX ADMIN — FERROUS SULFATE TAB 325 MG (65 MG ELEMENTAL FE) SCH MG: 325 (65 FE) TAB at 17:23

## 2018-11-30 RX ADMIN — AMPICILLIN SODIUM AND SULBACTAM SODIUM SCH MLS/HR: 2; 1 INJECTION, POWDER, FOR SOLUTION INTRAMUSCULAR; INTRAVENOUS at 03:56

## 2018-11-30 RX ADMIN — AMPICILLIN SODIUM AND SULBACTAM SODIUM SCH MLS/HR: 2; 1 INJECTION, POWDER, FOR SOLUTION INTRAMUSCULAR; INTRAVENOUS at 23:03

## 2018-11-30 RX ADMIN — MULTIVITAMIN SCH ML: LIQUID ORAL at 10:21

## 2018-11-30 RX ADMIN — Medication SCH MG: at 08:23

## 2018-11-30 RX ADMIN — LISINOPRIL SCH MG: 5 TABLET ORAL at 12:31

## 2018-11-30 RX ADMIN — AMPICILLIN SODIUM AND SULBACTAM SODIUM SCH MLS/HR: 2; 1 INJECTION, POWDER, FOR SOLUTION INTRAMUSCULAR; INTRAVENOUS at 10:20

## 2018-11-30 RX ADMIN — FERROUS SULFATE TAB 325 MG (65 MG ELEMENTAL FE) SCH MG: 325 (65 FE) TAB at 08:23

## 2018-11-30 RX ADMIN — AMPICILLIN SODIUM AND SULBACTAM SODIUM SCH MLS/HR: 2; 1 INJECTION, POWDER, FOR SOLUTION INTRAMUSCULAR; INTRAVENOUS at 17:23

## 2018-11-30 RX ADMIN — ENOXAPARIN SODIUM SCH MG: 40 INJECTION SUBCUTANEOUS at 15:00

## 2018-11-30 RX ADMIN — VANCOMYCIN HYDROCHLORIDE SCH MLS/HR: 1 INJECTION, POWDER, LYOPHILIZED, FOR SOLUTION INTRAVENOUS at 12:30

## 2018-11-30 RX ADMIN — FERROUS SULFATE TAB 325 MG (65 MG ELEMENTAL FE) SCH MG: 325 (65 FE) TAB at 12:30

## 2018-11-30 NOTE — ADDENDUM NOTE
Encounter addended by: Archana Junior on: 11/30/2018  1:53 PM<BR>    Actions taken: Sign clinical note

## 2018-11-30 NOTE — DOCUMENTATION QUERY
"DOCUMENTATION QUERY    PROVIDERS: Please select “Cosign w/ note”to reply to query.    To better represent the severity of illness of your patient, please review the following information and exercise your independent professional judgment in responding to this query.     Wound infection is documented in the History and Physical, Progress Notes and Discharge Summary. Based upon the clinical findings, risk factors, and treatment, can this diagnosis be further specified?    • There is no relationship between the wound infection and previous surgery  • The  the wound infection is secondary to the previous surgery  • Other explanation of clinical findings (please document)  • Unable to determine        The medical record reflects the following:   Clinical Findings  Initial wound/injury occurred in May 2018 with necrotizing fasciitis    Patient has had poor wound healing since and has been not following up with wound care taking his oral antibiotics.  He is a chronic alcoholic.    ED record:  \"...complaint of malodorous discharge from his surgical sites...Wound infection.\"     Treatment  IV antibiotics     Risk Factors  Alcoholic  Poor self-care  Co-morbid conditions   Location within medical record  History and Physical, Progress Notes, Discharge Summary and Emergency Department record     Thank you,     Yeny LUKE Coder  [phone]          "

## 2018-11-30 NOTE — ADDENDUM NOTE
Encounter addended by: Archana Junior on: 11/30/2018  6:30 AM<BR>    Actions taken: Pend clinical note

## 2018-12-01 VITALS — SYSTOLIC BLOOD PRESSURE: 161 MMHG | DIASTOLIC BLOOD PRESSURE: 89 MMHG

## 2018-12-01 VITALS — DIASTOLIC BLOOD PRESSURE: 86 MMHG | SYSTOLIC BLOOD PRESSURE: 160 MMHG

## 2018-12-01 VITALS — SYSTOLIC BLOOD PRESSURE: 156 MMHG | DIASTOLIC BLOOD PRESSURE: 80 MMHG

## 2018-12-01 VITALS — SYSTOLIC BLOOD PRESSURE: 156 MMHG | DIASTOLIC BLOOD PRESSURE: 88 MMHG

## 2018-12-01 VITALS — SYSTOLIC BLOOD PRESSURE: 195 MMHG | DIASTOLIC BLOOD PRESSURE: 76 MMHG

## 2018-12-01 VITALS — DIASTOLIC BLOOD PRESSURE: 78 MMHG | SYSTOLIC BLOOD PRESSURE: 195 MMHG

## 2018-12-01 LAB
<PLATELET ESTIMATE>: (no result)
<PLT MORPHOLOGY>: (no result)
ANISOCYTOSIS BLD QL SMEAR: (no result)
ERYTHROCYTE [DISTWIDTH] IN BLOOD BY AUTOMATED COUNT: 18.6 % (ref 9.4–14.8)
ERYTHROCYTE [SEDIMENTATION RATE] IN BLOOD BY WESTERGREN METHOD: 86 MM/HR (ref 0–10)
HCT (SEDRATE): 30 % (ref 39.2–51.8)
HYPOCHROMIA BLD QL SMEAR: (no result)
LYMPH#(MANUAL): 1.54 X10^3/UL (ref 1–3.4)
LYMPHS% (MANUAL): 27 % (ref 22–44)
MACROCYTES BLD QL SMEAR: (no result)
MCH RBC QN AUTO: 25.9 PG (ref 27.5–34.5)
MCHC RBC AUTO-ENTMCNC: 32.2 G/DL (ref 33.2–36.2)
MCV RBC AUTO: 80.4 FL (ref 81–97)
MD: YES
MICROCYTES BLD QL SMEAR: (no result)
MONOS#(MANUAL): 0.86 X10^3/UL (ref 0.3–2.7)
MONOS% (MANUAL): 15 % (ref 2–9)
OVALOCYTES BLD QL SMEAR: (no result)
PLATELET # BLD AUTO: 559 X10^3/UL (ref 130–400)
PMV BLD AUTO: 7.2 FL (ref 7.4–10.4)
RBC # BLD AUTO: 3.73 X10^6/UL (ref 4.38–5.82)
SEG#(MANUAL): 3.31 X10^3/UL (ref 1.8–6.8)
SEGS% (MANUAL): 58 % (ref 42–75)

## 2018-12-01 RX ADMIN — Medication SCH MG: at 08:16

## 2018-12-01 RX ADMIN — AMPICILLIN SODIUM AND SULBACTAM SODIUM SCH MLS/HR: 2; 1 INJECTION, POWDER, FOR SOLUTION INTRAMUSCULAR; INTRAVENOUS at 05:02

## 2018-12-01 RX ADMIN — AMPICILLIN SODIUM AND SULBACTAM SODIUM SCH MLS/HR: 2; 1 INJECTION, POWDER, FOR SOLUTION INTRAMUSCULAR; INTRAVENOUS at 11:55

## 2018-12-01 RX ADMIN — FERROUS SULFATE TAB 325 MG (65 MG ELEMENTAL FE) SCH MG: 325 (65 FE) TAB at 17:11

## 2018-12-01 RX ADMIN — MULTIVITAMIN SCH ML: LIQUID ORAL at 08:23

## 2018-12-01 RX ADMIN — AMPICILLIN SODIUM AND SULBACTAM SODIUM SCH MLS/HR: 2; 1 INJECTION, POWDER, FOR SOLUTION INTRAMUSCULAR; INTRAVENOUS at 23:19

## 2018-12-01 RX ADMIN — VANCOMYCIN HYDROCHLORIDE SCH MLS/HR: 1 INJECTION, POWDER, LYOPHILIZED, FOR SOLUTION INTRAVENOUS at 06:08

## 2018-12-01 RX ADMIN — FERROUS SULFATE TAB 325 MG (65 MG ELEMENTAL FE) SCH MG: 325 (65 FE) TAB at 08:16

## 2018-12-01 RX ADMIN — FERROUS SULFATE TAB 325 MG (65 MG ELEMENTAL FE) SCH MG: 325 (65 FE) TAB at 11:58

## 2018-12-01 RX ADMIN — LISINOPRIL SCH MG: 5 TABLET ORAL at 08:16

## 2018-12-01 RX ADMIN — ENOXAPARIN SODIUM SCH MG: 40 INJECTION SUBCUTANEOUS at 15:00

## 2018-12-01 RX ADMIN — FOLIC ACID SCH MG: 1 TABLET ORAL at 08:16

## 2018-12-01 RX ADMIN — AMPICILLIN SODIUM AND SULBACTAM SODIUM SCH MLS/HR: 2; 1 INJECTION, POWDER, FOR SOLUTION INTRAMUSCULAR; INTRAVENOUS at 17:10

## 2018-12-02 VITALS — SYSTOLIC BLOOD PRESSURE: 153 MMHG | DIASTOLIC BLOOD PRESSURE: 90 MMHG

## 2018-12-02 VITALS — SYSTOLIC BLOOD PRESSURE: 147 MMHG | DIASTOLIC BLOOD PRESSURE: 88 MMHG

## 2018-12-02 VITALS — DIASTOLIC BLOOD PRESSURE: 88 MMHG | SYSTOLIC BLOOD PRESSURE: 139 MMHG

## 2018-12-02 VITALS — DIASTOLIC BLOOD PRESSURE: 74 MMHG | SYSTOLIC BLOOD PRESSURE: 154 MMHG

## 2018-12-02 RX ADMIN — VANCOMYCIN HYDROCHLORIDE SCH MLS/HR: 1 INJECTION, POWDER, LYOPHILIZED, FOR SOLUTION INTRAVENOUS at 20:14

## 2018-12-02 RX ADMIN — AMPICILLIN SODIUM AND SULBACTAM SODIUM SCH MLS/HR: 2; 1 INJECTION, POWDER, FOR SOLUTION INTRAMUSCULAR; INTRAVENOUS at 12:40

## 2018-12-02 RX ADMIN — FERROUS SULFATE TAB 325 MG (65 MG ELEMENTAL FE) SCH MG: 325 (65 FE) TAB at 17:06

## 2018-12-02 RX ADMIN — FERROUS SULFATE TAB 325 MG (65 MG ELEMENTAL FE) SCH MG: 325 (65 FE) TAB at 07:56

## 2018-12-02 RX ADMIN — Medication SCH MG: at 07:56

## 2018-12-02 RX ADMIN — FERROUS SULFATE TAB 325 MG (65 MG ELEMENTAL FE) SCH MG: 325 (65 FE) TAB at 12:00

## 2018-12-02 RX ADMIN — AMPICILLIN SODIUM AND SULBACTAM SODIUM SCH MLS/HR: 2; 1 INJECTION, POWDER, FOR SOLUTION INTRAMUSCULAR; INTRAVENOUS at 23:20

## 2018-12-02 RX ADMIN — IBUPROFEN PRN MG: 600 TABLET ORAL at 23:20

## 2018-12-02 RX ADMIN — AMPICILLIN SODIUM AND SULBACTAM SODIUM SCH MLS/HR: 2; 1 INJECTION, POWDER, FOR SOLUTION INTRAMUSCULAR; INTRAVENOUS at 05:25

## 2018-12-02 RX ADMIN — FOLIC ACID SCH MG: 1 TABLET ORAL at 07:56

## 2018-12-02 RX ADMIN — LISINOPRIL SCH MG: 5 TABLET ORAL at 07:56

## 2018-12-02 RX ADMIN — AMPICILLIN SODIUM AND SULBACTAM SODIUM SCH MLS/HR: 2; 1 INJECTION, POWDER, FOR SOLUTION INTRAMUSCULAR; INTRAVENOUS at 17:06

## 2018-12-02 RX ADMIN — VANCOMYCIN HYDROCHLORIDE SCH MLS/HR: 1 INJECTION, POWDER, LYOPHILIZED, FOR SOLUTION INTRAVENOUS at 00:37

## 2018-12-02 RX ADMIN — ENOXAPARIN SODIUM SCH MG: 40 INJECTION SUBCUTANEOUS at 15:00

## 2018-12-02 RX ADMIN — MULTIVITAMIN SCH ML: LIQUID ORAL at 07:57

## 2018-12-03 VITALS — DIASTOLIC BLOOD PRESSURE: 84 MMHG | SYSTOLIC BLOOD PRESSURE: 134 MMHG

## 2018-12-03 VITALS — SYSTOLIC BLOOD PRESSURE: 146 MMHG | DIASTOLIC BLOOD PRESSURE: 87 MMHG

## 2018-12-03 VITALS — DIASTOLIC BLOOD PRESSURE: 91 MMHG | SYSTOLIC BLOOD PRESSURE: 137 MMHG

## 2018-12-03 VITALS — DIASTOLIC BLOOD PRESSURE: 94 MMHG | SYSTOLIC BLOOD PRESSURE: 164 MMHG

## 2018-12-03 VITALS — DIASTOLIC BLOOD PRESSURE: 98 MMHG | SYSTOLIC BLOOD PRESSURE: 165 MMHG

## 2018-12-03 RX ADMIN — FOLIC ACID SCH MG: 1 TABLET ORAL at 10:29

## 2018-12-03 RX ADMIN — AMPICILLIN SODIUM AND SULBACTAM SODIUM SCH MLS/HR: 2; 1 INJECTION, POWDER, FOR SOLUTION INTRAMUSCULAR; INTRAVENOUS at 11:32

## 2018-12-03 RX ADMIN — FERROUS SULFATE TAB 325 MG (65 MG ELEMENTAL FE) SCH MG: 325 (65 FE) TAB at 17:29

## 2018-12-03 RX ADMIN — IBUPROFEN PRN MG: 600 TABLET ORAL at 22:51

## 2018-12-03 RX ADMIN — MULTIVITAMIN SCH ML: LIQUID ORAL at 10:31

## 2018-12-03 RX ADMIN — FERROUS SULFATE TAB 325 MG (65 MG ELEMENTAL FE) SCH MG: 325 (65 FE) TAB at 13:05

## 2018-12-03 RX ADMIN — ENOXAPARIN SODIUM SCH MG: 40 INJECTION SUBCUTANEOUS at 14:18

## 2018-12-03 RX ADMIN — VANCOMYCIN HYDROCHLORIDE SCH MLS/HR: 1 INJECTION, POWDER, LYOPHILIZED, FOR SOLUTION INTRAVENOUS at 13:06

## 2018-12-03 RX ADMIN — AMPICILLIN SODIUM AND SULBACTAM SODIUM SCH MLS/HR: 2; 1 INJECTION, POWDER, FOR SOLUTION INTRAMUSCULAR; INTRAVENOUS at 05:05

## 2018-12-03 RX ADMIN — AMPICILLIN SODIUM AND SULBACTAM SODIUM SCH MLS/HR: 2; 1 INJECTION, POWDER, FOR SOLUTION INTRAMUSCULAR; INTRAVENOUS at 23:39

## 2018-12-03 RX ADMIN — Medication SCH MG: at 10:29

## 2018-12-03 RX ADMIN — LISINOPRIL SCH MG: 5 TABLET ORAL at 10:30

## 2018-12-03 RX ADMIN — AMPICILLIN SODIUM AND SULBACTAM SODIUM SCH MLS/HR: 2; 1 INJECTION, POWDER, FOR SOLUTION INTRAMUSCULAR; INTRAVENOUS at 17:29

## 2018-12-03 RX ADMIN — FERROUS SULFATE TAB 325 MG (65 MG ELEMENTAL FE) SCH MG: 325 (65 FE) TAB at 10:29

## 2018-12-03 NOTE — ADDENDUM NOTE
Encounter addended by: Luis Worley M.D. on: 12/3/2018 10:15 AM<BR>    Actions taken: Cosign clinical note with attestation

## 2018-12-04 VITALS — DIASTOLIC BLOOD PRESSURE: 82 MMHG | SYSTOLIC BLOOD PRESSURE: 131 MMHG

## 2018-12-04 VITALS — SYSTOLIC BLOOD PRESSURE: 129 MMHG | DIASTOLIC BLOOD PRESSURE: 81 MMHG

## 2018-12-04 RX ADMIN — FERROUS SULFATE TAB 325 MG (65 MG ELEMENTAL FE) SCH MG: 325 (65 FE) TAB at 08:21

## 2018-12-04 RX ADMIN — AMPICILLIN SODIUM AND SULBACTAM SODIUM SCH MLS/HR: 2; 1 INJECTION, POWDER, FOR SOLUTION INTRAMUSCULAR; INTRAVENOUS at 12:01

## 2018-12-04 RX ADMIN — Medication SCH MG: at 08:23

## 2018-12-04 RX ADMIN — FERROUS SULFATE TAB 325 MG (65 MG ELEMENTAL FE) SCH MG: 325 (65 FE) TAB at 12:02

## 2018-12-04 RX ADMIN — LISINOPRIL SCH MG: 5 TABLET ORAL at 08:23

## 2018-12-04 RX ADMIN — MULTIVITAMIN SCH ML: LIQUID ORAL at 08:21

## 2018-12-04 RX ADMIN — AMPICILLIN SODIUM AND SULBACTAM SODIUM SCH MLS/HR: 2; 1 INJECTION, POWDER, FOR SOLUTION INTRAMUSCULAR; INTRAVENOUS at 05:06

## 2018-12-04 RX ADMIN — FOLIC ACID SCH MG: 1 TABLET ORAL at 08:24

## 2018-12-11 ENCOUNTER — HOSPITAL ENCOUNTER (INPATIENT)
Dept: HOSPITAL 8 - 3E | Age: 63
LOS: 3 days | Discharge: NURSING, REHAB OR LONG-TERM CARE | DRG: 885 | End: 2018-12-14
Attending: PSYCHIATRY & NEUROLOGY | Admitting: PSYCHIATRY & NEUROLOGY
Payer: MEDICAID

## 2018-12-11 VITALS — BODY MASS INDEX: 25.67 KG/M2 | WEIGHT: 154.1 LBS | HEIGHT: 65 IN

## 2018-12-11 VITALS — SYSTOLIC BLOOD PRESSURE: 167 MMHG | DIASTOLIC BLOOD PRESSURE: 85 MMHG
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
## 2018-12-11 DIAGNOSIS — M19.90: ICD-10-CM

## 2018-12-11 DIAGNOSIS — K21.9: ICD-10-CM

## 2018-12-11 DIAGNOSIS — Z91.81: ICD-10-CM

## 2018-12-11 DIAGNOSIS — L03.116: ICD-10-CM

## 2018-12-11 DIAGNOSIS — F17.200: ICD-10-CM

## 2018-12-11 DIAGNOSIS — Z59.0: ICD-10-CM

## 2018-12-11 DIAGNOSIS — I10: ICD-10-CM
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
## 2018-12-11 DIAGNOSIS — F29: Primary | ICD-10-CM

## 2018-12-11 DIAGNOSIS — Z82.49: ICD-10-CM

## 2018-12-11 DIAGNOSIS — G47.00: ICD-10-CM

## 2018-12-11 DIAGNOSIS — I45.81: ICD-10-CM

## 2018-12-11 DIAGNOSIS — F10.20: ICD-10-CM

## 2018-12-11 DIAGNOSIS — Z79.899: ICD-10-CM

## 2018-12-11 PROCEDURE — 93005 ELECTROCARDIOGRAM TRACING: CPT

## 2018-12-11 PROCEDURE — 86592 SYPHILIS TEST NON-TREP QUAL: CPT

## 2018-12-11 PROCEDURE — 82607 VITAMIN B-12: CPT

## 2018-12-11 PROCEDURE — 80061 LIPID PANEL: CPT

## 2018-12-11 PROCEDURE — 82140 ASSAY OF AMMONIA: CPT

## 2018-12-11 PROCEDURE — 36415 COLL VENOUS BLD VENIPUNCTURE: CPT

## 2018-12-11 PROCEDURE — 84443 ASSAY THYROID STIM HORMONE: CPT

## 2018-12-11 PROCEDURE — 85025 COMPLETE CBC W/AUTO DIFF WBC: CPT

## 2018-12-11 PROCEDURE — 80048 BASIC METABOLIC PNL TOTAL CA: CPT

## 2018-12-11 PROCEDURE — 82746 ASSAY OF FOLIC ACID SERUM: CPT

## 2018-12-11 PROCEDURE — 84439 ASSAY OF FREE THYROXINE: CPT

## 2018-12-11 RX ADMIN — LACTOBACILLUS TAB SCH TAB: TAB at 23:13

## 2018-12-11 RX ADMIN — PANTOPRAZOLE SODIUM SCH MG: 20 TABLET, DELAYED RELEASE ORAL at 23:12

## 2018-12-11 RX ADMIN — DIVALPROEX SODIUM SCH MG: 250 TABLET, EXTENDED RELEASE ORAL at 23:14

## 2018-12-11 SDOH — ECONOMIC STABILITY - HOUSING INSECURITY: HOMELESSNESS: Z59.0

## 2018-12-12 VITALS — SYSTOLIC BLOOD PRESSURE: 152 MMHG | DIASTOLIC BLOOD PRESSURE: 75 MMHG

## 2018-12-12 VITALS — SYSTOLIC BLOOD PRESSURE: 152 MMHG | DIASTOLIC BLOOD PRESSURE: 91 MMHG

## 2018-12-12 LAB
ANION GAP SERPL CALC-SCNC: 10 MMOL/L (ref 5–15)
BASOPHILS # BLD AUTO: 0.06 X10^3/UL (ref 0–0.1)
BASOPHILS NFR BLD AUTO: 1 % (ref 0–1)
CALCIUM SERPL-MCNC: 8 MG/DL (ref 8.5–10.1)
CHLORIDE SERPL-SCNC: 107 MMOL/L (ref 98–107)
CHOL/HDL RATIO: 2.7
CREAT SERPL-MCNC: 0.58 MG/DL (ref 0.7–1.3)
EOSINOPHIL # BLD AUTO: 0.15 X10^3/UL (ref 0–0.4)
EOSINOPHIL NFR BLD AUTO: 3 % (ref 1–7)
ERYTHROCYTE [DISTWIDTH] IN BLOOD BY AUTOMATED COUNT: 20.2 % (ref 9.4–14.8)
HDL CHOL %: 37 % (ref 26–37)
HDL CHOLESTEROL (DIRECT): 52 MG/DL (ref 40–60)
LDL CHOLESTEROL,CALCULATED: 77 MG/DL (ref 54–169)
LDLC/HDLC SERPL: 1.5 {RATIO} (ref 0.5–3)
LYMPHOCYTES # BLD AUTO: 1.69 X10^3/UL (ref 1–3.4)
LYMPHOCYTES NFR BLD AUTO: 35 % (ref 22–44)
MCH RBC QN AUTO: 27.7 PG (ref 27.5–34.5)
MCHC RBC AUTO-ENTMCNC: 33.3 G/DL (ref 33.2–36.2)
MCV RBC AUTO: 83.4 FL (ref 81–97)
MD: NO
MONOCYTES # BLD AUTO: 0.75 X10^3/UL (ref 0.2–0.8)
MONOCYTES NFR BLD AUTO: 16 % (ref 2–9)
NEUTROPHILS # BLD AUTO: 2.17 X10^3/UL (ref 1.8–6.8)
NEUTROPHILS NFR BLD AUTO: 45 % (ref 42–75)
PLATELET # BLD AUTO: 460 X10^3/UL (ref 130–400)
PMV BLD AUTO: 7.8 FL (ref 7.4–10.4)
RBC # BLD AUTO: 2.83 X10^6/UL (ref 4.38–5.82)
T4 FREE SERPL-MCNC: 1.15 NG/DL (ref 0.76–1.46)
TRIGL SERPL-MCNC: 59 MG/DL (ref 50–200)
TSH SERPL-ACNC: 1.82 MIU/L (ref 0.36–3.74)
VLDLC SERPL CALC-MCNC: 12 MG/DL (ref 0–25)

## 2018-12-12 RX ADMIN — CEPHALEXIN SCH MG: 500 CAPSULE ORAL at 08:23

## 2018-12-12 RX ADMIN — DIVALPROEX SODIUM SCH MG: 250 TABLET, EXTENDED RELEASE ORAL at 21:02

## 2018-12-12 RX ADMIN — FERROUS SULFATE TAB 325 MG (65 MG ELEMENTAL FE) SCH MG: 325 (65 FE) TAB at 17:21

## 2018-12-12 RX ADMIN — LISINOPRIL SCH MG: 5 TABLET ORAL at 08:23

## 2018-12-12 RX ADMIN — CEPHALEXIN SCH MG: 500 CAPSULE ORAL at 21:02

## 2018-12-12 RX ADMIN — FERROUS SULFATE TAB 325 MG (65 MG ELEMENTAL FE) SCH MG: 325 (65 FE) TAB at 07:49

## 2018-12-12 RX ADMIN — LACTOBACILLUS TAB SCH TAB: TAB at 21:03

## 2018-12-12 RX ADMIN — LACTOBACILLUS TAB SCH TAB: TAB at 17:21

## 2018-12-12 RX ADMIN — FERROUS SULFATE TAB 325 MG (65 MG ELEMENTAL FE) SCH MG: 325 (65 FE) TAB at 11:58

## 2018-12-12 RX ADMIN — PANTOPRAZOLE SODIUM SCH MG: 20 TABLET, DELAYED RELEASE ORAL at 21:02

## 2018-12-12 RX ADMIN — LACTOBACILLUS TAB SCH TAB: TAB at 08:23

## 2018-12-12 RX ADMIN — QUETIAPINE FUMARATE SCH MG: 100 TABLET ORAL at 21:00

## 2018-12-12 RX ADMIN — PANTOPRAZOLE SODIUM SCH MG: 20 TABLET, DELAYED RELEASE ORAL at 07:49

## 2018-12-12 RX ADMIN — GABAPENTIN SCH MG: 100 CAPSULE ORAL at 21:03

## 2018-12-13 VITALS — DIASTOLIC BLOOD PRESSURE: 75 MMHG | SYSTOLIC BLOOD PRESSURE: 121 MMHG

## 2018-12-13 RX ADMIN — PANTOPRAZOLE SODIUM SCH MG: 20 TABLET, DELAYED RELEASE ORAL at 20:21

## 2018-12-13 RX ADMIN — FERROUS SULFATE TAB 325 MG (65 MG ELEMENTAL FE) SCH MG: 325 (65 FE) TAB at 08:26

## 2018-12-13 RX ADMIN — DIVALPROEX SODIUM SCH MG: 250 TABLET, EXTENDED RELEASE ORAL at 20:21

## 2018-12-13 RX ADMIN — LISINOPRIL SCH MG: 5 TABLET ORAL at 08:26

## 2018-12-13 RX ADMIN — GABAPENTIN SCH MG: 100 CAPSULE ORAL at 16:03

## 2018-12-13 RX ADMIN — LACTOBACILLUS TAB SCH TAB: TAB at 16:04

## 2018-12-13 RX ADMIN — PANTOPRAZOLE SODIUM SCH MG: 20 TABLET, DELAYED RELEASE ORAL at 08:00

## 2018-12-13 RX ADMIN — FERROUS SULFATE TAB 325 MG (65 MG ELEMENTAL FE) SCH MG: 325 (65 FE) TAB at 11:42

## 2018-12-13 RX ADMIN — LACTOBACILLUS TAB SCH TAB: TAB at 08:26

## 2018-12-13 RX ADMIN — GABAPENTIN SCH MG: 100 CAPSULE ORAL at 08:26

## 2018-12-13 RX ADMIN — FERROUS SULFATE TAB 325 MG (65 MG ELEMENTAL FE) SCH MG: 325 (65 FE) TAB at 16:03

## 2018-12-13 RX ADMIN — GABAPENTIN SCH MG: 100 CAPSULE ORAL at 20:21

## 2018-12-13 RX ADMIN — CEPHALEXIN SCH MG: 500 CAPSULE ORAL at 20:22

## 2018-12-13 RX ADMIN — LACTOBACILLUS TAB SCH TAB: TAB at 20:22

## 2018-12-13 RX ADMIN — QUETIAPINE FUMARATE SCH MG: 100 TABLET ORAL at 20:22

## 2018-12-13 RX ADMIN — CEPHALEXIN SCH MG: 500 CAPSULE ORAL at 08:25

## 2018-12-14 VITALS — DIASTOLIC BLOOD PRESSURE: 76 MMHG | SYSTOLIC BLOOD PRESSURE: 121 MMHG

## 2018-12-14 RX ADMIN — PANTOPRAZOLE SODIUM SCH MG: 20 TABLET, DELAYED RELEASE ORAL at 09:39

## 2018-12-14 RX ADMIN — GABAPENTIN SCH MG: 100 CAPSULE ORAL at 09:38

## 2018-12-14 RX ADMIN — LACTOBACILLUS TAB SCH TAB: TAB at 09:39

## 2018-12-14 RX ADMIN — CEPHALEXIN SCH MG: 500 CAPSULE ORAL at 09:39

## 2018-12-14 RX ADMIN — LISINOPRIL SCH MG: 5 TABLET ORAL at 09:38

## 2018-12-14 RX ADMIN — FERROUS SULFATE TAB 325 MG (65 MG ELEMENTAL FE) SCH MG: 325 (65 FE) TAB at 12:10

## 2018-12-14 RX ADMIN — FERROUS SULFATE TAB 325 MG (65 MG ELEMENTAL FE) SCH MG: 325 (65 FE) TAB at 09:39

## 2019-01-02 ENCOUNTER — HOSPITAL ENCOUNTER (EMERGENCY)
Dept: HOSPITAL 8 - ED | Age: 64
Discharge: HOME | End: 2019-01-02
Payer: SELF-PAY

## 2019-01-02 VITALS — BODY MASS INDEX: 21.3 KG/M2 | HEIGHT: 71 IN | WEIGHT: 152.12 LBS

## 2019-01-02 VITALS — SYSTOLIC BLOOD PRESSURE: 124 MMHG | DIASTOLIC BLOOD PRESSURE: 80 MMHG
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
## 2019-01-02 DIAGNOSIS — S42.294A: Primary | ICD-10-CM

## 2019-01-02 DIAGNOSIS — F17.200: ICD-10-CM
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
## 2019-01-02 DIAGNOSIS — F12.10: ICD-10-CM
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
## 2019-01-02 PROCEDURE — 99283 EMERGENCY DEPT VISIT LOW MDM: CPT

## 2019-01-20 ENCOUNTER — HOSPITAL ENCOUNTER (INPATIENT)
Dept: HOSPITAL 8 - ED | Age: 64
LOS: 3 days | Discharge: LEFT BEFORE DISCHARGE | DRG: 872 | End: 2019-01-23
Attending: INTERNAL MEDICINE | Admitting: INTERNAL MEDICINE
Payer: COMMERCIAL

## 2019-01-20 VITALS — HEIGHT: 71 IN | BODY MASS INDEX: 17.84 KG/M2 | WEIGHT: 127.43 LBS

## 2019-01-20 VITALS — DIASTOLIC BLOOD PRESSURE: 84 MMHG | SYSTOLIC BLOOD PRESSURE: 130 MMHG

## 2019-01-20 VITALS — SYSTOLIC BLOOD PRESSURE: 129 MMHG | DIASTOLIC BLOOD PRESSURE: 78 MMHG

## 2019-01-20 DIAGNOSIS — A41.9: Primary | ICD-10-CM
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
## 2019-01-20 DIAGNOSIS — Z91.19: ICD-10-CM

## 2019-01-20 DIAGNOSIS — D64.9: ICD-10-CM
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
## 2019-01-20 DIAGNOSIS — I10: ICD-10-CM
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
## 2019-01-20 DIAGNOSIS — F10.20: ICD-10-CM

## 2019-01-20 DIAGNOSIS — E55.9: ICD-10-CM

## 2019-01-20 DIAGNOSIS — L03.116: ICD-10-CM

## 2019-01-20 DIAGNOSIS — Z59.0: ICD-10-CM

## 2019-01-20 DIAGNOSIS — F17.200: ICD-10-CM

## 2019-01-20 DIAGNOSIS — G93.49: ICD-10-CM

## 2019-01-20 DIAGNOSIS — Z53.21: ICD-10-CM

## 2019-01-20 DIAGNOSIS — S71.132A: ICD-10-CM

## 2019-01-20 LAB
ALBUMIN SERPL-MCNC: 2.9 G/DL (ref 3.4–5)
ANION GAP SERPL CALC-SCNC: 9 MMOL/L (ref 5–15)
BASOPHILS # BLD AUTO: 0.06 X10^3/UL (ref 0–0.1)
BASOPHILS NFR BLD AUTO: 0 % (ref 0–1)
CALCIUM SERPL-MCNC: 8.7 MG/DL (ref 8.5–10.1)
CHLORIDE SERPL-SCNC: 107 MMOL/L (ref 98–107)
CREAT SERPL-MCNC: 1.02 MG/DL (ref 0.7–1.3)
EOSINOPHIL # BLD AUTO: 0.02 X10^3/UL (ref 0–0.4)
EOSINOPHIL NFR BLD AUTO: 0 % (ref 1–7)
ERYTHROCYTE [DISTWIDTH] IN BLOOD BY AUTOMATED COUNT: 16.9 % (ref 9.4–14.8)
ERYTHROCYTE [SEDIMENTATION RATE] IN BLOOD BY WESTERGREN METHOD: 81 MM/HR (ref 0–10)
HCT (SEDRATE): 33.7 % (ref 39.2–51.8)
LYMPHOCYTES # BLD AUTO: 0.8 X10^3/UL (ref 1–3.4)
LYMPHOCYTES NFR BLD AUTO: 6 % (ref 22–44)
MCH RBC QN AUTO: 26.2 PG (ref 27.5–34.5)
MCHC RBC AUTO-ENTMCNC: 32 G/DL (ref 33.2–36.2)
MCV RBC AUTO: 82 FL (ref 81–97)
MD: (no result)
MONOCYTES # BLD AUTO: 0.82 X10^3/UL (ref 0.2–0.8)
MONOCYTES NFR BLD AUTO: 6 % (ref 2–9)
NEUTROPHILS # BLD AUTO: 12.85 X10^3/UL (ref 1.8–6.8)
NEUTROPHILS NFR BLD AUTO: 88 % (ref 42–75)
PLATELET # BLD AUTO: 635 X10^3/UL (ref 130–400)
PMV BLD AUTO: 7.2 FL (ref 7.4–10.4)
RBC # BLD AUTO: 4.19 X10^6/UL (ref 4.38–5.82)

## 2019-01-20 PROCEDURE — 83605 ASSAY OF LACTIC ACID: CPT

## 2019-01-20 PROCEDURE — 82040 ASSAY OF SERUM ALBUMIN: CPT

## 2019-01-20 PROCEDURE — 87186 SC STD MICRODIL/AGAR DIL: CPT

## 2019-01-20 PROCEDURE — 87077 CULTURE AEROBIC IDENTIFY: CPT

## 2019-01-20 PROCEDURE — 99285 EMERGENCY DEPT VISIT HI MDM: CPT

## 2019-01-20 PROCEDURE — 85651 RBC SED RATE NONAUTOMATED: CPT

## 2019-01-20 PROCEDURE — 80048 BASIC METABOLIC PNL TOTAL CA: CPT

## 2019-01-20 PROCEDURE — 96367 TX/PROPH/DG ADDL SEQ IV INF: CPT

## 2019-01-20 PROCEDURE — 36415 COLL VENOUS BLD VENIPUNCTURE: CPT

## 2019-01-20 PROCEDURE — 84145 PROCALCITONIN (PCT): CPT

## 2019-01-20 PROCEDURE — 87086 URINE CULTURE/COLONY COUNT: CPT

## 2019-01-20 PROCEDURE — 96365 THER/PROPH/DIAG IV INF INIT: CPT

## 2019-01-20 PROCEDURE — 96366 THER/PROPH/DIAG IV INF ADDON: CPT

## 2019-01-20 PROCEDURE — 86140 C-REACTIVE PROTEIN: CPT

## 2019-01-20 PROCEDURE — 82140 ASSAY OF AMMONIA: CPT

## 2019-01-20 PROCEDURE — 82607 VITAMIN B-12: CPT

## 2019-01-20 PROCEDURE — 87205 SMEAR GRAM STAIN: CPT

## 2019-01-20 PROCEDURE — 87147 CULTURE TYPE IMMUNOLOGIC: CPT

## 2019-01-20 PROCEDURE — 87070 CULTURE OTHR SPECIMN AEROBIC: CPT

## 2019-01-20 PROCEDURE — 85025 COMPLETE CBC W/AUTO DIFF WBC: CPT

## 2019-01-20 PROCEDURE — 81001 URINALYSIS AUTO W/SCOPE: CPT

## 2019-01-20 PROCEDURE — 87040 BLOOD CULTURE FOR BACTERIA: CPT

## 2019-01-20 RX ADMIN — ENOXAPARIN SODIUM SCH MG: 40 INJECTION SUBCUTANEOUS at 15:33

## 2019-01-20 RX ADMIN — SODIUM CHLORIDE SCH MLS/HR: 0.9 INJECTION, SOLUTION INTRAVENOUS at 15:33

## 2019-01-20 RX ADMIN — AMPICILLIN SODIUM AND SULBACTAM SODIUM SCH MLS/HR: 2; 1 INJECTION, POWDER, FOR SOLUTION INTRAMUSCULAR; INTRAVENOUS at 17:17

## 2019-01-20 RX ADMIN — AMPICILLIN SODIUM AND SULBACTAM SODIUM SCH MLS/HR: 2; 1 INJECTION, POWDER, FOR SOLUTION INTRAMUSCULAR; INTRAVENOUS at 12:00

## 2019-01-20 SDOH — ECONOMIC STABILITY - HOUSING INSECURITY: HOMELESSNESS: Z59.0

## 2019-01-21 VITALS — SYSTOLIC BLOOD PRESSURE: 137 MMHG | DIASTOLIC BLOOD PRESSURE: 75 MMHG

## 2019-01-21 VITALS — DIASTOLIC BLOOD PRESSURE: 73 MMHG | SYSTOLIC BLOOD PRESSURE: 133 MMHG

## 2019-01-21 VITALS — SYSTOLIC BLOOD PRESSURE: 128 MMHG | DIASTOLIC BLOOD PRESSURE: 74 MMHG

## 2019-01-21 VITALS — SYSTOLIC BLOOD PRESSURE: 133 MMHG | DIASTOLIC BLOOD PRESSURE: 78 MMHG

## 2019-01-21 LAB
ANION GAP SERPL CALC-SCNC: 6 MMOL/L (ref 5–15)
BASOPHILS # BLD AUTO: 0.04 X10^3/UL (ref 0–0.1)
BASOPHILS NFR BLD AUTO: 1 % (ref 0–1)
CALCIUM SERPL-MCNC: 7.7 MG/DL (ref 8.5–10.1)
CHLORIDE SERPL-SCNC: 110 MMOL/L (ref 98–107)
CREAT SERPL-MCNC: 0.88 MG/DL (ref 0.7–1.3)
CRP SERPL-MCNC: 14 MG/DL (ref 0.02–0.49)
CULTURE INDICATED?: YES
EOSINOPHIL # BLD AUTO: 0.06 X10^3/UL (ref 0–0.4)
EOSINOPHIL NFR BLD AUTO: 1 % (ref 1–7)
ERYTHROCYTE [DISTWIDTH] IN BLOOD BY AUTOMATED COUNT: 17.1 % (ref 9.4–14.8)
LYMPHOCYTES # BLD AUTO: 1.03 X10^3/UL (ref 1–3.4)
LYMPHOCYTES NFR BLD AUTO: 12 % (ref 22–44)
MCH RBC QN AUTO: 26.1 PG (ref 27.5–34.5)
MCHC RBC AUTO-ENTMCNC: 31.7 G/DL (ref 33.2–36.2)
MCV RBC AUTO: 82.3 FL (ref 81–97)
MD: NO
MICROSCOPIC: (no result)
MONOCYTES # BLD AUTO: 1.2 X10^3/UL (ref 0.2–0.8)
MONOCYTES NFR BLD AUTO: 14 % (ref 2–9)
NEUTROPHILS # BLD AUTO: 6.4 X10^3/UL (ref 1.8–6.8)
NEUTROPHILS NFR BLD AUTO: 73 % (ref 42–75)
PLATELET # BLD AUTO: 559 X10^3/UL (ref 130–400)
PMV BLD AUTO: 7.2 FL (ref 7.4–10.4)
RBC # BLD AUTO: 3.65 X10^6/UL (ref 4.38–5.82)

## 2019-01-21 RX ADMIN — AMPICILLIN SODIUM AND SULBACTAM SODIUM SCH MLS/HR: 2; 1 INJECTION, POWDER, FOR SOLUTION INTRAMUSCULAR; INTRAVENOUS at 08:38

## 2019-01-21 RX ADMIN — AMPICILLIN SODIUM AND SULBACTAM SODIUM SCH MLS/HR: 2; 1 INJECTION, POWDER, FOR SOLUTION INTRAMUSCULAR; INTRAVENOUS at 00:08

## 2019-01-21 RX ADMIN — VANCOMYCIN HYDROCHLORIDE SCH MLS/HR: 1 INJECTION, POWDER, LYOPHILIZED, FOR SOLUTION INTRAVENOUS at 02:57

## 2019-01-21 RX ADMIN — ZOLPIDEM TARTRATE PRN MG: 5 TABLET, COATED ORAL at 00:58

## 2019-01-21 RX ADMIN — Medication SCH MG: at 08:38

## 2019-01-21 RX ADMIN — CHOLECALCIFEROL TAB 125 MCG (5000 UNIT) SCH UNIT: 125 TAB at 08:38

## 2019-01-21 RX ADMIN — ENOXAPARIN SODIUM SCH MG: 40 INJECTION SUBCUTANEOUS at 16:58

## 2019-01-21 RX ADMIN — AMPICILLIN SODIUM AND SULBACTAM SODIUM SCH MLS/HR: 2; 1 INJECTION, POWDER, FOR SOLUTION INTRAMUSCULAR; INTRAVENOUS at 16:59

## 2019-01-21 RX ADMIN — ACETAMINOPHEN PRN MG: 325 TABLET, FILM COATED ORAL at 00:55

## 2019-01-21 RX ADMIN — SODIUM CHLORIDE SCH MLS/HR: 0.9 INJECTION, SOLUTION INTRAVENOUS at 05:00

## 2019-01-21 RX ADMIN — ACETAMINOPHEN PRN MG: 325 TABLET, FILM COATED ORAL at 16:58

## 2019-01-22 VITALS — DIASTOLIC BLOOD PRESSURE: 74 MMHG | SYSTOLIC BLOOD PRESSURE: 118 MMHG

## 2019-01-22 VITALS — DIASTOLIC BLOOD PRESSURE: 73 MMHG | SYSTOLIC BLOOD PRESSURE: 125 MMHG

## 2019-01-22 VITALS — SYSTOLIC BLOOD PRESSURE: 139 MMHG | DIASTOLIC BLOOD PRESSURE: 82 MMHG

## 2019-01-22 VITALS — SYSTOLIC BLOOD PRESSURE: 122 MMHG | DIASTOLIC BLOOD PRESSURE: 68 MMHG

## 2019-01-22 VITALS — SYSTOLIC BLOOD PRESSURE: 101 MMHG | DIASTOLIC BLOOD PRESSURE: 55 MMHG

## 2019-01-22 LAB
ANION GAP SERPL CALC-SCNC: 5 MMOL/L (ref 5–15)
BASOPHILS # BLD AUTO: 0.03 X10^3/UL (ref 0–0.1)
BASOPHILS NFR BLD AUTO: 0 % (ref 0–1)
CALCIUM SERPL-MCNC: 8.5 MG/DL (ref 8.5–10.1)
CHLORIDE SERPL-SCNC: 108 MMOL/L (ref 98–107)
CREAT SERPL-MCNC: 0.8 MG/DL (ref 0.7–1.3)
EOSINOPHIL # BLD AUTO: 0.11 X10^3/UL (ref 0–0.4)
EOSINOPHIL NFR BLD AUTO: 1 % (ref 1–7)
ERYTHROCYTE [DISTWIDTH] IN BLOOD BY AUTOMATED COUNT: 17.1 % (ref 9.4–14.8)
LYMPHOCYTES # BLD AUTO: 1.94 X10^3/UL (ref 1–3.4)
LYMPHOCYTES NFR BLD AUTO: 18 % (ref 22–44)
MCH RBC QN AUTO: 26.8 PG (ref 27.5–34.5)
MCHC RBC AUTO-ENTMCNC: 32.4 G/DL (ref 33.2–36.2)
MCV RBC AUTO: 82.9 FL (ref 81–97)
MD: (no result)
MONOCYTES # BLD AUTO: 1.74 X10^3/UL (ref 0.2–0.8)
MONOCYTES NFR BLD AUTO: 16 % (ref 2–9)
NEUTROPHILS # BLD AUTO: 6.86 X10^3/UL (ref 1.8–6.8)
NEUTROPHILS NFR BLD AUTO: 64 % (ref 42–75)
PLATELET # BLD AUTO: 628 X10^3/UL (ref 130–400)
PMV BLD AUTO: 7.2 FL (ref 7.4–10.4)
RBC # BLD AUTO: 4.05 X10^6/UL (ref 4.38–5.82)

## 2019-01-22 RX ADMIN — ENOXAPARIN SODIUM SCH MG: 40 INJECTION SUBCUTANEOUS at 17:32

## 2019-01-22 RX ADMIN — ZOLPIDEM TARTRATE PRN MG: 5 TABLET, COATED ORAL at 00:48

## 2019-01-22 RX ADMIN — ACETAMINOPHEN PRN MG: 325 TABLET, FILM COATED ORAL at 13:23

## 2019-01-22 RX ADMIN — VANCOMYCIN HYDROCHLORIDE SCH MLS/HR: 1 INJECTION, POWDER, LYOPHILIZED, FOR SOLUTION INTRAVENOUS at 19:26

## 2019-01-22 RX ADMIN — AMPICILLIN SODIUM AND SULBACTAM SODIUM SCH MLS/HR: 2; 1 INJECTION, POWDER, FOR SOLUTION INTRAMUSCULAR; INTRAVENOUS at 00:29

## 2019-01-22 RX ADMIN — Medication SCH MG: at 10:21

## 2019-01-22 RX ADMIN — VANCOMYCIN HYDROCHLORIDE SCH MLS/HR: 1 INJECTION, POWDER, LYOPHILIZED, FOR SOLUTION INTRAVENOUS at 01:09

## 2019-01-22 RX ADMIN — AMPICILLIN SODIUM AND SULBACTAM SODIUM SCH MLS/HR: 2; 1 INJECTION, POWDER, FOR SOLUTION INTRAMUSCULAR; INTRAVENOUS at 11:00

## 2019-01-22 RX ADMIN — CHOLECALCIFEROL TAB 125 MCG (5000 UNIT) SCH UNIT: 125 TAB at 10:21

## 2019-01-22 RX ADMIN — AMPICILLIN SODIUM AND SULBACTAM SODIUM SCH MLS/HR: 2; 1 INJECTION, POWDER, FOR SOLUTION INTRAMUSCULAR; INTRAVENOUS at 06:16

## 2019-01-22 RX ADMIN — AMPICILLIN SODIUM AND SULBACTAM SODIUM SCH MLS/HR: 2; 1 INJECTION, POWDER, FOR SOLUTION INTRAMUSCULAR; INTRAVENOUS at 17:32

## 2019-01-22 RX ADMIN — ACETAMINOPHEN PRN MG: 325 TABLET, FILM COATED ORAL at 00:48

## 2019-01-22 RX ADMIN — AMPICILLIN SODIUM AND SULBACTAM SODIUM SCH MLS/HR: 2; 1 INJECTION, POWDER, FOR SOLUTION INTRAMUSCULAR; INTRAVENOUS at 10:58

## 2019-01-23 ENCOUNTER — HOSPITAL ENCOUNTER (INPATIENT)
Dept: HOSPITAL 8 - ED | Age: 64
LOS: 10 days | Discharge: LEFT BEFORE DISCHARGE | DRG: 854 | End: 2019-02-02
Attending: HOSPITALIST | Admitting: HOSPITALIST
Payer: MEDICAID

## 2019-01-23 VITALS — WEIGHT: 141.54 LBS | BODY MASS INDEX: 19.81 KG/M2 | HEIGHT: 71 IN

## 2019-01-23 VITALS — DIASTOLIC BLOOD PRESSURE: 71 MMHG | SYSTOLIC BLOOD PRESSURE: 153 MMHG

## 2019-01-23 VITALS — SYSTOLIC BLOOD PRESSURE: 150 MMHG | DIASTOLIC BLOOD PRESSURE: 77 MMHG

## 2019-01-23 VITALS — DIASTOLIC BLOOD PRESSURE: 80 MMHG | SYSTOLIC BLOOD PRESSURE: 124 MMHG

## 2019-01-23 DIAGNOSIS — L97.929: ICD-10-CM

## 2019-01-23 DIAGNOSIS — S82.302G: ICD-10-CM

## 2019-01-23 DIAGNOSIS — Z91.14: ICD-10-CM

## 2019-01-23 DIAGNOSIS — A41.9: Primary | ICD-10-CM

## 2019-01-23 DIAGNOSIS — Z87.81: ICD-10-CM

## 2019-01-23 DIAGNOSIS — Z87.440: ICD-10-CM

## 2019-01-23 DIAGNOSIS — F10.20: ICD-10-CM

## 2019-01-23 DIAGNOSIS — Z79.899: ICD-10-CM

## 2019-01-23 DIAGNOSIS — E44.0: ICD-10-CM

## 2019-01-23 DIAGNOSIS — D50.9: ICD-10-CM

## 2019-01-23 DIAGNOSIS — B85.1: ICD-10-CM

## 2019-01-23 DIAGNOSIS — F29: ICD-10-CM

## 2019-01-23 DIAGNOSIS — I10: ICD-10-CM

## 2019-01-23 DIAGNOSIS — F17.200: ICD-10-CM

## 2019-01-23 DIAGNOSIS — K21.9: ICD-10-CM

## 2019-01-23 DIAGNOSIS — M54.9: ICD-10-CM

## 2019-01-23 DIAGNOSIS — E87.2: ICD-10-CM

## 2019-01-23 DIAGNOSIS — L03.116: ICD-10-CM

## 2019-01-23 LAB
ALBUMIN SERPL-MCNC: 2.5 G/DL (ref 3.4–5)
ALP SERPL-CCNC: 92 U/L (ref 45–117)
ALT SERPL-CCNC: 15 U/L (ref 12–78)
ANION GAP SERPL CALC-SCNC: 14 MMOL/L (ref 5–15)
ANION GAP SERPL CALC-SCNC: 7 MMOL/L (ref 5–15)
BASOPHILS # BLD AUTO: 0.02 X10^3/UL (ref 0–0.1)
BASOPHILS # BLD AUTO: 0.16 X10^3/UL (ref 0–0.1)
BASOPHILS NFR BLD AUTO: 0 % (ref 0–1)
BASOPHILS NFR BLD AUTO: 1 % (ref 0–1)
BILIRUB SERPL-MCNC: 0.1 MG/DL (ref 0.2–1)
CALCIUM SERPL-MCNC: 8.7 MG/DL (ref 8.5–10.1)
CALCIUM SERPL-MCNC: 9 MG/DL (ref 8.5–10.1)
CHLORIDE SERPL-SCNC: 103 MMOL/L (ref 98–107)
CHLORIDE SERPL-SCNC: 103 MMOL/L (ref 98–107)
CREAT SERPL-MCNC: 0.64 MG/DL (ref 0.7–1.3)
CREAT SERPL-MCNC: 0.68 MG/DL (ref 0.7–1.3)
CRP SERPL-MCNC: 9.2 MG/DL (ref 0.02–0.49)
EOSINOPHIL # BLD AUTO: 0.05 X10^3/UL (ref 0–0.4)
EOSINOPHIL # BLD AUTO: 0.2 X10^3/UL (ref 0–0.4)
EOSINOPHIL NFR BLD AUTO: 0 % (ref 1–7)
EOSINOPHIL NFR BLD AUTO: 2 % (ref 1–7)
ERYTHROCYTE [DISTWIDTH] IN BLOOD BY AUTOMATED COUNT: 16.5 % (ref 9.4–14.8)
ERYTHROCYTE [DISTWIDTH] IN BLOOD BY AUTOMATED COUNT: 17.1 % (ref 9.4–14.8)
EST. AVERAGE GLUCOSE BLD GHB EST-MCNC: 114 MG/DL (ref 0–126)
HBA1C MFR BLD: 5.6 % (ref 4.2–6.3)
HEMOGRAM NOTE: (no result)
LYMPHOCYTES # BLD AUTO: 1.54 X10^3/UL (ref 1–3.4)
LYMPHOCYTES # BLD AUTO: 1.56 X10^3/UL (ref 1–3.4)
LYMPHOCYTES NFR BLD AUTO: 13 % (ref 22–44)
LYMPHOCYTES NFR BLD AUTO: 17 % (ref 22–44)
MCH RBC QN AUTO: 26 PG (ref 27.5–34.5)
MCH RBC QN AUTO: 26.1 PG (ref 27.5–34.5)
MCHC RBC AUTO-ENTMCNC: 32 G/DL (ref 33.2–36.2)
MCHC RBC AUTO-ENTMCNC: 32.1 G/DL (ref 33.2–36.2)
MCV RBC AUTO: 81.2 FL (ref 81–97)
MCV RBC AUTO: 81.5 FL (ref 81–97)
MD: NO
MD: NO
MONOCYTES # BLD AUTO: 0.99 X10^3/UL (ref 0.2–0.8)
MONOCYTES # BLD AUTO: 1.02 X10^3/UL (ref 0.2–0.8)
MONOCYTES NFR BLD AUTO: 12 % (ref 2–9)
MONOCYTES NFR BLD AUTO: 8 % (ref 2–9)
NEUTROPHILS # BLD AUTO: 6.11 X10^3/UL (ref 1.8–6.8)
NEUTROPHILS # BLD AUTO: 9.36 X10^3/UL (ref 1.8–6.8)
NEUTROPHILS NFR BLD AUTO: 69 % (ref 42–75)
NEUTROPHILS NFR BLD AUTO: 77 % (ref 42–75)
PLATELET # BLD AUTO: 580 X10^3/UL (ref 130–400)
PLATELET # BLD AUTO: 691 X10^3/UL (ref 130–400)
PMV BLD AUTO: 6.8 FL (ref 7.4–10.4)
PMV BLD AUTO: 7.6 FL (ref 7.4–10.4)
PROT SERPL-MCNC: 7.8 G/DL (ref 6.4–8.2)
RBC # BLD AUTO: 3.38 X10^6/UL (ref 4.38–5.82)
RBC # BLD AUTO: 3.74 X10^6/UL (ref 4.38–5.82)
T4 FREE SERPL-MCNC: 1.11 NG/DL (ref 0.76–1.46)
TSH SERPL-ACNC: 0.96 MIU/L (ref 0.36–3.74)

## 2019-01-23 PROCEDURE — 97163 PT EVAL HIGH COMPLEX 45 MIN: CPT

## 2019-01-23 PROCEDURE — 87205 SMEAR GRAM STAIN: CPT

## 2019-01-23 PROCEDURE — 84145 PROCALCITONIN (PCT): CPT

## 2019-01-23 PROCEDURE — 87186 SC STD MICRODIL/AGAR DIL: CPT

## 2019-01-23 PROCEDURE — 80048 BASIC METABOLIC PNL TOTAL CA: CPT

## 2019-01-23 PROCEDURE — 83036 HEMOGLOBIN GLYCOSYLATED A1C: CPT

## 2019-01-23 PROCEDURE — 84439 ASSAY OF FREE THYROXINE: CPT

## 2019-01-23 PROCEDURE — 87147 CULTURE TYPE IMMUNOLOGIC: CPT

## 2019-01-23 PROCEDURE — 87077 CULTURE AEROBIC IDENTIFY: CPT

## 2019-01-23 PROCEDURE — 87040 BLOOD CULTURE FOR BACTERIA: CPT

## 2019-01-23 PROCEDURE — 85025 COMPLETE CBC W/AUTO DIFF WBC: CPT

## 2019-01-23 PROCEDURE — 87075 CULTR BACTERIA EXCEPT BLOOD: CPT

## 2019-01-23 PROCEDURE — 84443 ASSAY THYROID STIM HORMONE: CPT

## 2019-01-23 PROCEDURE — 80053 COMPREHEN METABOLIC PANEL: CPT

## 2019-01-23 PROCEDURE — 83735 ASSAY OF MAGNESIUM: CPT

## 2019-01-23 PROCEDURE — 83605 ASSAY OF LACTIC ACID: CPT

## 2019-01-23 PROCEDURE — 96374 THER/PROPH/DIAG INJ IV PUSH: CPT

## 2019-01-23 PROCEDURE — 85610 PROTHROMBIN TIME: CPT

## 2019-01-23 PROCEDURE — 80061 LIPID PANEL: CPT

## 2019-01-23 PROCEDURE — 87070 CULTURE OTHR SPECIMN AEROBIC: CPT

## 2019-01-23 PROCEDURE — 36415 COLL VENOUS BLD VENIPUNCTURE: CPT

## 2019-01-23 PROCEDURE — 99285 EMERGENCY DEPT VISIT HI MDM: CPT

## 2019-01-23 PROCEDURE — 80202 ASSAY OF VANCOMYCIN: CPT

## 2019-01-23 RX ADMIN — LACTOBACILLUS TAB SCH TAB: TAB at 21:00

## 2019-01-23 RX ADMIN — GABAPENTIN SCH MG: 100 CAPSULE ORAL at 21:22

## 2019-01-23 RX ADMIN — CHOLECALCIFEROL TAB 125 MCG (5000 UNIT) SCH UNIT: 125 TAB at 08:34

## 2019-01-23 RX ADMIN — AMPICILLIN SODIUM AND SULBACTAM SODIUM SCH MLS/HR: 2; 1 INJECTION, POWDER, FOR SOLUTION INTRAMUSCULAR; INTRAVENOUS at 06:20

## 2019-01-23 RX ADMIN — FERROUS SULFATE TAB 325 MG (65 MG ELEMENTAL FE) SCH MG: 325 (65 FE) TAB at 21:23

## 2019-01-23 RX ADMIN — AMPICILLIN SODIUM AND SULBACTAM SODIUM SCH MLS/HR: 2; 1 INJECTION, POWDER, FOR SOLUTION INTRAMUSCULAR; INTRAVENOUS at 21:22

## 2019-01-23 RX ADMIN — PANTOPRAZOLE SODIUM SCH MG: 20 TABLET, DELAYED RELEASE ORAL at 21:23

## 2019-01-23 RX ADMIN — ENOXAPARIN SODIUM SCH MG: 40 INJECTION SUBCUTANEOUS at 21:22

## 2019-01-23 RX ADMIN — Medication SCH MG: at 08:34

## 2019-01-23 RX ADMIN — QUETIAPINE FUMARATE SCH MG: 100 TABLET ORAL at 21:23

## 2019-01-23 RX ADMIN — ENOXAPARIN SODIUM SCH MG: 40 INJECTION SUBCUTANEOUS at 20:00

## 2019-01-23 RX ADMIN — AMPICILLIN SODIUM AND SULBACTAM SODIUM SCH MLS/HR: 2; 1 INJECTION, POWDER, FOR SOLUTION INTRAMUSCULAR; INTRAVENOUS at 00:08

## 2019-01-24 VITALS — SYSTOLIC BLOOD PRESSURE: 127 MMHG | DIASTOLIC BLOOD PRESSURE: 79 MMHG

## 2019-01-24 VITALS — SYSTOLIC BLOOD PRESSURE: 156 MMHG | DIASTOLIC BLOOD PRESSURE: 79 MMHG

## 2019-01-24 VITALS — SYSTOLIC BLOOD PRESSURE: 148 MMHG | DIASTOLIC BLOOD PRESSURE: 76 MMHG

## 2019-01-24 VITALS — SYSTOLIC BLOOD PRESSURE: 144 MMHG | DIASTOLIC BLOOD PRESSURE: 86 MMHG

## 2019-01-24 LAB
ALBUMIN SERPL-MCNC: 2.3 G/DL (ref 3.4–5)
ALP SERPL-CCNC: 92 U/L (ref 45–117)
ALT SERPL-CCNC: 15 U/L (ref 12–78)
ANION GAP SERPL CALC-SCNC: 9 MMOL/L (ref 5–15)
BASOPHILS # BLD AUTO: 0.04 X10^3/UL (ref 0–0.1)
BASOPHILS NFR BLD AUTO: 1 % (ref 0–1)
BILIRUB SERPL-MCNC: 0.2 MG/DL (ref 0.2–1)
CALCIUM SERPL-MCNC: 8.1 MG/DL (ref 8.5–10.1)
CHLORIDE SERPL-SCNC: 107 MMOL/L (ref 98–107)
CHOL/HDL RATIO: 3.3
CREAT SERPL-MCNC: 0.72 MG/DL (ref 0.7–1.3)
EOSINOPHIL # BLD AUTO: 0.14 X10^3/UL (ref 0–0.4)
EOSINOPHIL NFR BLD AUTO: 2 % (ref 1–7)
ERYTHROCYTE [DISTWIDTH] IN BLOOD BY AUTOMATED COUNT: 17.2 % (ref 9.4–14.8)
HDL CHOL %: 30 % (ref 26–37)
HDL CHOLESTEROL (DIRECT): 37 MG/DL (ref 40–60)
LDL CHOLESTEROL,CALCULATED: 69 MG/DL (ref 54–169)
LDLC/HDLC SERPL: 1.9 {RATIO} (ref 0.5–3)
LYMPHOCYTES # BLD AUTO: 1.6 X10^3/UL (ref 1–3.4)
LYMPHOCYTES NFR BLD AUTO: 21 % (ref 22–44)
MCH RBC QN AUTO: 26.3 PG (ref 27.5–34.5)
MCHC RBC AUTO-ENTMCNC: 32.4 G/DL (ref 33.2–36.2)
MCV RBC AUTO: 81.2 FL (ref 81–97)
MD: NO
MONOCYTES # BLD AUTO: 0.97 X10^3/UL (ref 0.2–0.8)
MONOCYTES NFR BLD AUTO: 12 % (ref 2–9)
NEUTROPHILS # BLD AUTO: 5.01 X10^3/UL (ref 1.8–6.8)
NEUTROPHILS NFR BLD AUTO: 65 % (ref 42–75)
PLATELET # BLD AUTO: 615 X10^3/UL (ref 130–400)
PMV BLD AUTO: 7 FL (ref 7.4–10.4)
PROT SERPL-MCNC: 7.2 G/DL (ref 6.4–8.2)
RBC # BLD AUTO: 3.57 X10^6/UL (ref 4.38–5.82)
TRIGL SERPL-MCNC: 85 MG/DL (ref 50–200)
VLDLC SERPL CALC-MCNC: 17 MG/DL (ref 0–25)

## 2019-01-24 RX ADMIN — PANTOPRAZOLE SODIUM SCH MG: 20 TABLET, DELAYED RELEASE ORAL at 08:08

## 2019-01-24 RX ADMIN — LACTOBACILLUS TAB SCH TAB: TAB at 16:26

## 2019-01-24 RX ADMIN — AMPICILLIN SODIUM AND SULBACTAM SODIUM SCH MLS/HR: 2; 1 INJECTION, POWDER, FOR SOLUTION INTRAMUSCULAR; INTRAVENOUS at 19:49

## 2019-01-24 RX ADMIN — AMPICILLIN SODIUM AND SULBACTAM SODIUM SCH MLS/HR: 2; 1 INJECTION, POWDER, FOR SOLUTION INTRAMUSCULAR; INTRAVENOUS at 02:29

## 2019-01-24 RX ADMIN — ENOXAPARIN SODIUM SCH MG: 40 INJECTION SUBCUTANEOUS at 20:00

## 2019-01-24 RX ADMIN — DIVALPROEX SODIUM SCH MG: 500 TABLET, EXTENDED RELEASE ORAL at 08:08

## 2019-01-24 RX ADMIN — AMPICILLIN SODIUM AND SULBACTAM SODIUM SCH MLS/HR: 2; 1 INJECTION, POWDER, FOR SOLUTION INTRAMUSCULAR; INTRAVENOUS at 08:08

## 2019-01-24 RX ADMIN — FERROUS SULFATE TAB 325 MG (65 MG ELEMENTAL FE) SCH MG: 325 (65 FE) TAB at 16:26

## 2019-01-24 RX ADMIN — GABAPENTIN SCH MG: 100 CAPSULE ORAL at 19:49

## 2019-01-24 RX ADMIN — GABAPENTIN SCH MG: 100 CAPSULE ORAL at 16:26

## 2019-01-24 RX ADMIN — LACTOBACILLUS TAB SCH TAB: TAB at 08:08

## 2019-01-24 RX ADMIN — GABAPENTIN SCH MG: 100 CAPSULE ORAL at 08:09

## 2019-01-24 RX ADMIN — AMPICILLIN SODIUM AND SULBACTAM SODIUM SCH MLS/HR: 2; 1 INJECTION, POWDER, FOR SOLUTION INTRAMUSCULAR; INTRAVENOUS at 13:50

## 2019-01-24 RX ADMIN — LISINOPRIL SCH MG: 5 TABLET ORAL at 08:09

## 2019-01-24 RX ADMIN — LACTOBACILLUS TAB SCH TAB: TAB at 19:49

## 2019-01-24 RX ADMIN — QUETIAPINE FUMARATE SCH MG: 100 TABLET ORAL at 19:50

## 2019-01-24 RX ADMIN — PANTOPRAZOLE SODIUM SCH MG: 20 TABLET, DELAYED RELEASE ORAL at 19:49

## 2019-01-25 VITALS — DIASTOLIC BLOOD PRESSURE: 70 MMHG | SYSTOLIC BLOOD PRESSURE: 128 MMHG

## 2019-01-25 VITALS — DIASTOLIC BLOOD PRESSURE: 81 MMHG | SYSTOLIC BLOOD PRESSURE: 126 MMHG

## 2019-01-25 VITALS — DIASTOLIC BLOOD PRESSURE: 88 MMHG | SYSTOLIC BLOOD PRESSURE: 149 MMHG

## 2019-01-25 VITALS — DIASTOLIC BLOOD PRESSURE: 69 MMHG | SYSTOLIC BLOOD PRESSURE: 113 MMHG

## 2019-01-25 LAB
ANION GAP SERPL CALC-SCNC: 6 MMOL/L (ref 5–15)
BASOPHILS # BLD AUTO: 0.04 X10^3/UL (ref 0–0.1)
BASOPHILS NFR BLD AUTO: 1 % (ref 0–1)
CALCIUM SERPL-MCNC: 8.7 MG/DL (ref 8.5–10.1)
CHLORIDE SERPL-SCNC: 106 MMOL/L (ref 98–107)
CREAT SERPL-MCNC: 0.88 MG/DL (ref 0.7–1.3)
EOSINOPHIL # BLD AUTO: 0.35 X10^3/UL (ref 0–0.4)
EOSINOPHIL NFR BLD AUTO: 5 % (ref 1–7)
ERYTHROCYTE [DISTWIDTH] IN BLOOD BY AUTOMATED COUNT: 17 % (ref 9.4–14.8)
LYMPHOCYTES # BLD AUTO: 1.81 X10^3/UL (ref 1–3.4)
LYMPHOCYTES NFR BLD AUTO: 27 % (ref 22–44)
MCH RBC QN AUTO: 26.2 PG (ref 27.5–34.5)
MCHC RBC AUTO-ENTMCNC: 32.4 G/DL (ref 33.2–36.2)
MCV RBC AUTO: 80.9 FL (ref 81–97)
MD: NO
MONOCYTES # BLD AUTO: 0.99 X10^3/UL (ref 0.2–0.8)
MONOCYTES NFR BLD AUTO: 15 % (ref 2–9)
NEUTROPHILS # BLD AUTO: 3.64 X10^3/UL (ref 1.8–6.8)
NEUTROPHILS NFR BLD AUTO: 53 % (ref 42–75)
PLATELET # BLD AUTO: 607 X10^3/UL (ref 130–400)
PMV BLD AUTO: 6.8 FL (ref 7.4–10.4)
RBC # BLD AUTO: 3.36 X10^6/UL (ref 4.38–5.82)

## 2019-01-25 RX ADMIN — FERROUS SULFATE TAB 325 MG (65 MG ELEMENTAL FE) SCH MG: 325 (65 FE) TAB at 14:11

## 2019-01-25 RX ADMIN — GABAPENTIN SCH MG: 100 CAPSULE ORAL at 08:41

## 2019-01-25 RX ADMIN — PANTOPRAZOLE SODIUM SCH MG: 20 TABLET, DELAYED RELEASE ORAL at 08:41

## 2019-01-25 RX ADMIN — LACTOBACILLUS TAB SCH TAB: TAB at 14:10

## 2019-01-25 RX ADMIN — AMPICILLIN SODIUM AND SULBACTAM SODIUM SCH MLS/HR: 2; 1 INJECTION, POWDER, FOR SOLUTION INTRAMUSCULAR; INTRAVENOUS at 08:41

## 2019-01-25 RX ADMIN — AMPICILLIN SODIUM AND SULBACTAM SODIUM SCH MLS/HR: 2; 1 INJECTION, POWDER, FOR SOLUTION INTRAMUSCULAR; INTRAVENOUS at 19:45

## 2019-01-25 RX ADMIN — PANTOPRAZOLE SODIUM SCH MG: 20 TABLET, DELAYED RELEASE ORAL at 19:46

## 2019-01-25 RX ADMIN — LACTOBACILLUS TAB SCH TAB: TAB at 19:46

## 2019-01-25 RX ADMIN — GABAPENTIN SCH MG: 100 CAPSULE ORAL at 19:46

## 2019-01-25 RX ADMIN — GABAPENTIN SCH MG: 100 CAPSULE ORAL at 14:10

## 2019-01-25 RX ADMIN — LACTOBACILLUS TAB SCH TAB: TAB at 08:41

## 2019-01-25 RX ADMIN — LISINOPRIL SCH MG: 5 TABLET ORAL at 08:41

## 2019-01-25 RX ADMIN — AMPICILLIN SODIUM AND SULBACTAM SODIUM SCH MLS/HR: 2; 1 INJECTION, POWDER, FOR SOLUTION INTRAMUSCULAR; INTRAVENOUS at 14:10

## 2019-01-25 RX ADMIN — QUETIAPINE FUMARATE SCH MG: 100 TABLET ORAL at 19:46

## 2019-01-25 RX ADMIN — DIVALPROEX SODIUM SCH MG: 500 TABLET, EXTENDED RELEASE ORAL at 08:41

## 2019-01-25 RX ADMIN — AMPICILLIN SODIUM AND SULBACTAM SODIUM SCH MLS/HR: 2; 1 INJECTION, POWDER, FOR SOLUTION INTRAMUSCULAR; INTRAVENOUS at 02:05

## 2019-01-25 RX ADMIN — ENOXAPARIN SODIUM SCH MG: 40 INJECTION SUBCUTANEOUS at 19:47

## 2019-01-26 VITALS — SYSTOLIC BLOOD PRESSURE: 140 MMHG | DIASTOLIC BLOOD PRESSURE: 81 MMHG

## 2019-01-26 VITALS — DIASTOLIC BLOOD PRESSURE: 89 MMHG | SYSTOLIC BLOOD PRESSURE: 157 MMHG

## 2019-01-26 VITALS — SYSTOLIC BLOOD PRESSURE: 137 MMHG | DIASTOLIC BLOOD PRESSURE: 68 MMHG

## 2019-01-26 VITALS — SYSTOLIC BLOOD PRESSURE: 151 MMHG | DIASTOLIC BLOOD PRESSURE: 81 MMHG

## 2019-01-26 RX ADMIN — LACTOBACILLUS TAB SCH TAB: TAB at 22:11

## 2019-01-26 RX ADMIN — PANTOPRAZOLE SODIUM SCH MG: 20 TABLET, DELAYED RELEASE ORAL at 22:11

## 2019-01-26 RX ADMIN — QUETIAPINE FUMARATE SCH MG: 100 TABLET ORAL at 22:11

## 2019-01-26 RX ADMIN — AMPICILLIN SODIUM AND SULBACTAM SODIUM SCH MLS/HR: 2; 1 INJECTION, POWDER, FOR SOLUTION INTRAMUSCULAR; INTRAVENOUS at 14:21

## 2019-01-26 RX ADMIN — DIVALPROEX SODIUM SCH MG: 500 TABLET, EXTENDED RELEASE ORAL at 08:59

## 2019-01-26 RX ADMIN — ENOXAPARIN SODIUM SCH MG: 40 INJECTION SUBCUTANEOUS at 20:00

## 2019-01-26 RX ADMIN — LACTOBACILLUS TAB SCH TAB: TAB at 08:59

## 2019-01-26 RX ADMIN — GABAPENTIN SCH MG: 100 CAPSULE ORAL at 16:54

## 2019-01-26 RX ADMIN — LISINOPRIL SCH MG: 5 TABLET ORAL at 08:59

## 2019-01-26 RX ADMIN — AMPICILLIN SODIUM AND SULBACTAM SODIUM SCH MLS/HR: 2; 1 INJECTION, POWDER, FOR SOLUTION INTRAMUSCULAR; INTRAVENOUS at 02:00

## 2019-01-26 RX ADMIN — AMPICILLIN SODIUM AND SULBACTAM SODIUM SCH MLS/HR: 2; 1 INJECTION, POWDER, FOR SOLUTION INTRAMUSCULAR; INTRAVENOUS at 22:10

## 2019-01-26 RX ADMIN — GABAPENTIN SCH MG: 100 CAPSULE ORAL at 22:11

## 2019-01-26 RX ADMIN — LACTOBACILLUS TAB SCH TAB: TAB at 16:54

## 2019-01-26 RX ADMIN — FERROUS SULFATE TAB 325 MG (65 MG ELEMENTAL FE) SCH MG: 325 (65 FE) TAB at 16:54

## 2019-01-26 RX ADMIN — GABAPENTIN SCH MG: 100 CAPSULE ORAL at 08:59

## 2019-01-26 RX ADMIN — PANTOPRAZOLE SODIUM SCH MG: 20 TABLET, DELAYED RELEASE ORAL at 20:00

## 2019-01-26 RX ADMIN — PANTOPRAZOLE SODIUM SCH MG: 20 TABLET, DELAYED RELEASE ORAL at 08:59

## 2019-01-26 RX ADMIN — AMPICILLIN SODIUM AND SULBACTAM SODIUM SCH MLS/HR: 2; 1 INJECTION, POWDER, FOR SOLUTION INTRAMUSCULAR; INTRAVENOUS at 08:52

## 2019-01-27 VITALS — SYSTOLIC BLOOD PRESSURE: 158 MMHG | DIASTOLIC BLOOD PRESSURE: 87 MMHG

## 2019-01-27 VITALS — SYSTOLIC BLOOD PRESSURE: 148 MMHG | DIASTOLIC BLOOD PRESSURE: 85 MMHG

## 2019-01-27 VITALS — DIASTOLIC BLOOD PRESSURE: 76 MMHG | SYSTOLIC BLOOD PRESSURE: 141 MMHG

## 2019-01-27 LAB
ANION GAP SERPL CALC-SCNC: 6 MMOL/L (ref 5–15)
BASOPHILS # BLD AUTO: 0.1 X10^3/UL (ref 0–0.1)
BASOPHILS NFR BLD AUTO: 2 % (ref 0–1)
CALCIUM SERPL-MCNC: 8.5 MG/DL (ref 8.5–10.1)
CHLORIDE SERPL-SCNC: 104 MMOL/L (ref 98–107)
CREAT SERPL-MCNC: 0.85 MG/DL (ref 0.7–1.3)
EOSINOPHIL # BLD AUTO: 0.35 X10^3/UL (ref 0–0.4)
EOSINOPHIL NFR BLD AUTO: 5 % (ref 1–7)
ERYTHROCYTE [DISTWIDTH] IN BLOOD BY AUTOMATED COUNT: 17 % (ref 9.4–14.8)
INR PPP: 1 (ref 0.93–1.1)
LYMPHOCYTES # BLD AUTO: 1.59 X10^3/UL (ref 1–3.4)
LYMPHOCYTES NFR BLD AUTO: 23 % (ref 22–44)
MCH RBC QN AUTO: 26.1 PG (ref 27.5–34.5)
MCHC RBC AUTO-ENTMCNC: 31.9 G/DL (ref 33.2–36.2)
MCV RBC AUTO: 81.6 FL (ref 81–97)
MD: NO
MONOCYTES # BLD AUTO: 0.72 X10^3/UL (ref 0.2–0.8)
MONOCYTES NFR BLD AUTO: 11 % (ref 2–9)
NEUTROPHILS # BLD AUTO: 4.04 X10^3/UL (ref 1.8–6.8)
NEUTROPHILS NFR BLD AUTO: 59 % (ref 42–75)
PLATELET # BLD AUTO: 726 X10^3/UL (ref 130–400)
PMV BLD AUTO: 6.9 FL (ref 7.4–10.4)
PROTHROMBIN TIME: 10.6 SECONDS (ref 9.6–11.5)
RBC # BLD AUTO: 3.56 X10^6/UL (ref 4.38–5.82)

## 2019-01-27 PROCEDURE — 0KDT0ZZ EXTRACTION OF LEFT LOWER LEG MUSCLE, OPEN APPROACH: ICD-10-PCS | Performed by: ORTHOPAEDIC SURGERY

## 2019-01-27 RX ADMIN — QUETIAPINE FUMARATE SCH MG: 100 TABLET ORAL at 20:17

## 2019-01-27 RX ADMIN — ENOXAPARIN SODIUM SCH MG: 40 INJECTION SUBCUTANEOUS at 20:19

## 2019-01-27 RX ADMIN — AMPICILLIN SODIUM AND SULBACTAM SODIUM SCH MLS/HR: 2; 1 INJECTION, POWDER, FOR SOLUTION INTRAMUSCULAR; INTRAVENOUS at 14:56

## 2019-01-27 RX ADMIN — AMPICILLIN SODIUM AND SULBACTAM SODIUM SCH MLS/HR: 2; 1 INJECTION, POWDER, FOR SOLUTION INTRAMUSCULAR; INTRAVENOUS at 08:41

## 2019-01-27 RX ADMIN — GABAPENTIN SCH MG: 100 CAPSULE ORAL at 20:17

## 2019-01-27 RX ADMIN — FERROUS SULFATE TAB 325 MG (65 MG ELEMENTAL FE) SCH MG: 325 (65 FE) TAB at 18:07

## 2019-01-27 RX ADMIN — LACTOBACILLUS TAB SCH TAB: TAB at 18:07

## 2019-01-27 RX ADMIN — GABAPENTIN SCH MG: 100 CAPSULE ORAL at 18:07

## 2019-01-27 RX ADMIN — DIVALPROEX SODIUM SCH MG: 500 TABLET, EXTENDED RELEASE ORAL at 08:41

## 2019-01-27 RX ADMIN — LISINOPRIL SCH MG: 5 TABLET ORAL at 08:41

## 2019-01-27 RX ADMIN — AMPICILLIN SODIUM AND SULBACTAM SODIUM SCH MLS/HR: 2; 1 INJECTION, POWDER, FOR SOLUTION INTRAMUSCULAR; INTRAVENOUS at 03:08

## 2019-01-27 RX ADMIN — AMPICILLIN SODIUM AND SULBACTAM SODIUM SCH MLS/HR: 2; 1 INJECTION, POWDER, FOR SOLUTION INTRAMUSCULAR; INTRAVENOUS at 20:17

## 2019-01-27 RX ADMIN — LACTOBACILLUS TAB SCH TAB: TAB at 08:41

## 2019-01-27 RX ADMIN — LACTOBACILLUS TAB SCH TAB: TAB at 20:17

## 2019-01-27 RX ADMIN — GABAPENTIN SCH MG: 100 CAPSULE ORAL at 08:41

## 2019-01-27 RX ADMIN — PANTOPRAZOLE SODIUM SCH MG: 20 TABLET, DELAYED RELEASE ORAL at 20:18

## 2019-01-28 VITALS — DIASTOLIC BLOOD PRESSURE: 89 MMHG | SYSTOLIC BLOOD PRESSURE: 141 MMHG

## 2019-01-28 VITALS — SYSTOLIC BLOOD PRESSURE: 128 MMHG | DIASTOLIC BLOOD PRESSURE: 77 MMHG

## 2019-01-28 VITALS — DIASTOLIC BLOOD PRESSURE: 87 MMHG | SYSTOLIC BLOOD PRESSURE: 157 MMHG

## 2019-01-28 VITALS — SYSTOLIC BLOOD PRESSURE: 141 MMHG | DIASTOLIC BLOOD PRESSURE: 81 MMHG

## 2019-01-28 RX ADMIN — QUETIAPINE FUMARATE SCH MG: 100 TABLET ORAL at 19:55

## 2019-01-28 RX ADMIN — PANTOPRAZOLE SODIUM SCH MG: 20 TABLET, DELAYED RELEASE ORAL at 19:55

## 2019-01-28 RX ADMIN — LACTOBACILLUS TAB SCH TAB: TAB at 16:48

## 2019-01-28 RX ADMIN — AMPICILLIN SODIUM AND SULBACTAM SODIUM SCH MLS/HR: 2; 1 INJECTION, POWDER, FOR SOLUTION INTRAMUSCULAR; INTRAVENOUS at 02:18

## 2019-01-28 RX ADMIN — FERROUS SULFATE TAB 325 MG (65 MG ELEMENTAL FE) SCH MG: 325 (65 FE) TAB at 16:48

## 2019-01-28 RX ADMIN — LACTOBACILLUS TAB SCH TAB: TAB at 19:55

## 2019-01-28 RX ADMIN — GABAPENTIN SCH MG: 100 CAPSULE ORAL at 16:48

## 2019-01-28 RX ADMIN — ENOXAPARIN SODIUM SCH MG: 40 INJECTION SUBCUTANEOUS at 19:56

## 2019-01-28 RX ADMIN — ACETAMINOPHEN PRN MG: 325 TABLET, FILM COATED ORAL at 19:55

## 2019-01-28 RX ADMIN — LISINOPRIL SCH MG: 5 TABLET ORAL at 08:13

## 2019-01-28 RX ADMIN — GABAPENTIN SCH MG: 100 CAPSULE ORAL at 08:13

## 2019-01-28 RX ADMIN — AMPICILLIN SODIUM AND SULBACTAM SODIUM SCH MLS/HR: 2; 1 INJECTION, POWDER, FOR SOLUTION INTRAMUSCULAR; INTRAVENOUS at 08:13

## 2019-01-28 RX ADMIN — DIVALPROEX SODIUM SCH MG: 500 TABLET, EXTENDED RELEASE ORAL at 08:13

## 2019-01-28 RX ADMIN — PANTOPRAZOLE SODIUM SCH MG: 20 TABLET, DELAYED RELEASE ORAL at 08:13

## 2019-01-28 RX ADMIN — GABAPENTIN SCH MG: 100 CAPSULE ORAL at 19:54

## 2019-01-28 RX ADMIN — AMPICILLIN SODIUM AND SULBACTAM SODIUM SCH MLS/HR: 2; 1 INJECTION, POWDER, FOR SOLUTION INTRAMUSCULAR; INTRAVENOUS at 19:50

## 2019-01-28 RX ADMIN — AMPICILLIN SODIUM AND SULBACTAM SODIUM SCH MLS/HR: 2; 1 INJECTION, POWDER, FOR SOLUTION INTRAMUSCULAR; INTRAVENOUS at 14:40

## 2019-01-28 RX ADMIN — LACTOBACILLUS TAB SCH TAB: TAB at 08:13

## 2019-01-29 VITALS — SYSTOLIC BLOOD PRESSURE: 117 MMHG | DIASTOLIC BLOOD PRESSURE: 71 MMHG

## 2019-01-29 VITALS — DIASTOLIC BLOOD PRESSURE: 82 MMHG | SYSTOLIC BLOOD PRESSURE: 140 MMHG

## 2019-01-29 VITALS — DIASTOLIC BLOOD PRESSURE: 83 MMHG | SYSTOLIC BLOOD PRESSURE: 136 MMHG

## 2019-01-29 VITALS — SYSTOLIC BLOOD PRESSURE: 136 MMHG | DIASTOLIC BLOOD PRESSURE: 96 MMHG

## 2019-01-29 LAB
ANION GAP SERPL CALC-SCNC: 5 MMOL/L (ref 5–15)
BASOPHILS # BLD AUTO: 0.07 X10^3/UL (ref 0–0.1)
BASOPHILS NFR BLD AUTO: 1 % (ref 0–1)
CALCIUM SERPL-MCNC: 8.5 MG/DL (ref 8.5–10.1)
CHLORIDE SERPL-SCNC: 104 MMOL/L (ref 98–107)
CREAT SERPL-MCNC: 1 MG/DL (ref 0.7–1.3)
EOSINOPHIL # BLD AUTO: 0.46 X10^3/UL (ref 0–0.4)
EOSINOPHIL NFR BLD AUTO: 8 % (ref 1–7)
ERYTHROCYTE [DISTWIDTH] IN BLOOD BY AUTOMATED COUNT: 17.5 % (ref 9.4–14.8)
LYMPHOCYTES # BLD AUTO: 1.74 X10^3/UL (ref 1–3.4)
LYMPHOCYTES NFR BLD AUTO: 30 % (ref 22–44)
MCH RBC QN AUTO: 26.5 PG (ref 27.5–34.5)
MCHC RBC AUTO-ENTMCNC: 32.5 G/DL (ref 33.2–36.2)
MCV RBC AUTO: 81.4 FL (ref 81–97)
MD: NO
MONOCYTES # BLD AUTO: 0.76 X10^3/UL (ref 0.2–0.8)
MONOCYTES NFR BLD AUTO: 13 % (ref 2–9)
NEUTROPHILS # BLD AUTO: 2.71 X10^3/UL (ref 1.8–6.8)
NEUTROPHILS NFR BLD AUTO: 47 % (ref 42–75)
PLATELET # BLD AUTO: 676 X10^3/UL (ref 130–400)
PMV BLD AUTO: 6.9 FL (ref 7.4–10.4)
RBC # BLD AUTO: 3.49 X10^6/UL (ref 4.38–5.82)

## 2019-01-29 RX ADMIN — PANTOPRAZOLE SODIUM SCH MG: 20 TABLET, DELAYED RELEASE ORAL at 08:43

## 2019-01-29 RX ADMIN — GABAPENTIN SCH MG: 100 CAPSULE ORAL at 16:02

## 2019-01-29 RX ADMIN — LACTOBACILLUS TAB SCH TAB: TAB at 20:26

## 2019-01-29 RX ADMIN — AMPICILLIN SODIUM AND SULBACTAM SODIUM SCH MLS/HR: 2; 1 INJECTION, POWDER, FOR SOLUTION INTRAMUSCULAR; INTRAVENOUS at 14:13

## 2019-01-29 RX ADMIN — FERROUS SULFATE TAB 325 MG (65 MG ELEMENTAL FE) SCH MG: 325 (65 FE) TAB at 17:29

## 2019-01-29 RX ADMIN — AMPICILLIN SODIUM AND SULBACTAM SODIUM SCH MLS/HR: 2; 1 INJECTION, POWDER, FOR SOLUTION INTRAMUSCULAR; INTRAVENOUS at 02:01

## 2019-01-29 RX ADMIN — LACTOBACILLUS TAB SCH TAB: TAB at 16:02

## 2019-01-29 RX ADMIN — ENOXAPARIN SODIUM SCH MG: 40 INJECTION SUBCUTANEOUS at 20:26

## 2019-01-29 RX ADMIN — LACTOBACILLUS TAB SCH TAB: TAB at 08:41

## 2019-01-29 RX ADMIN — PANTOPRAZOLE SODIUM SCH MG: 20 TABLET, DELAYED RELEASE ORAL at 20:27

## 2019-01-29 RX ADMIN — GABAPENTIN SCH MG: 100 CAPSULE ORAL at 08:43

## 2019-01-29 RX ADMIN — AMPICILLIN SODIUM AND SULBACTAM SODIUM SCH MLS/HR: 2; 1 INJECTION, POWDER, FOR SOLUTION INTRAMUSCULAR; INTRAVENOUS at 08:39

## 2019-01-29 RX ADMIN — QUETIAPINE FUMARATE SCH MG: 100 TABLET ORAL at 20:27

## 2019-01-29 RX ADMIN — DIVALPROEX SODIUM SCH MG: 500 TABLET, EXTENDED RELEASE ORAL at 08:42

## 2019-01-29 RX ADMIN — AMPICILLIN SODIUM AND SULBACTAM SODIUM SCH MLS/HR: 2; 1 INJECTION, POWDER, FOR SOLUTION INTRAMUSCULAR; INTRAVENOUS at 20:26

## 2019-01-29 RX ADMIN — GABAPENTIN SCH MG: 100 CAPSULE ORAL at 20:27

## 2019-01-29 RX ADMIN — LISINOPRIL SCH MG: 5 TABLET ORAL at 08:42

## 2019-01-30 VITALS — DIASTOLIC BLOOD PRESSURE: 82 MMHG | SYSTOLIC BLOOD PRESSURE: 127 MMHG

## 2019-01-30 VITALS — SYSTOLIC BLOOD PRESSURE: 111 MMHG | DIASTOLIC BLOOD PRESSURE: 71 MMHG

## 2019-01-30 VITALS — SYSTOLIC BLOOD PRESSURE: 127 MMHG | DIASTOLIC BLOOD PRESSURE: 81 MMHG

## 2019-01-30 VITALS — DIASTOLIC BLOOD PRESSURE: 75 MMHG | SYSTOLIC BLOOD PRESSURE: 144 MMHG

## 2019-01-30 RX ADMIN — QUETIAPINE FUMARATE SCH MG: 100 TABLET ORAL at 20:30

## 2019-01-30 RX ADMIN — AMPICILLIN SODIUM AND SULBACTAM SODIUM SCH MLS/HR: 2; 1 INJECTION, POWDER, FOR SOLUTION INTRAMUSCULAR; INTRAVENOUS at 20:27

## 2019-01-30 RX ADMIN — VANCOMYCIN HYDROCHLORIDE SCH MLS/HR: 1 INJECTION, POWDER, LYOPHILIZED, FOR SOLUTION INTRAVENOUS at 22:18

## 2019-01-30 RX ADMIN — ACETAMINOPHEN PRN MG: 325 TABLET, FILM COATED ORAL at 20:30

## 2019-01-30 RX ADMIN — GABAPENTIN SCH MG: 100 CAPSULE ORAL at 15:55

## 2019-01-30 RX ADMIN — FERROUS SULFATE TAB 325 MG (65 MG ELEMENTAL FE) SCH MG: 325 (65 FE) TAB at 15:55

## 2019-01-30 RX ADMIN — LACTOBACILLUS TAB SCH TAB: TAB at 20:29

## 2019-01-30 RX ADMIN — GABAPENTIN SCH MG: 100 CAPSULE ORAL at 20:30

## 2019-01-30 RX ADMIN — ENOXAPARIN SODIUM SCH MG: 40 INJECTION SUBCUTANEOUS at 20:31

## 2019-01-30 RX ADMIN — AMPICILLIN SODIUM AND SULBACTAM SODIUM SCH MLS/HR: 2; 1 INJECTION, POWDER, FOR SOLUTION INTRAMUSCULAR; INTRAVENOUS at 02:27

## 2019-01-30 RX ADMIN — LACTOBACILLUS TAB SCH TAB: TAB at 08:36

## 2019-01-30 RX ADMIN — LACTOBACILLUS TAB SCH TAB: TAB at 15:55

## 2019-01-30 RX ADMIN — LISINOPRIL SCH MG: 5 TABLET ORAL at 08:36

## 2019-01-30 RX ADMIN — AMPICILLIN SODIUM AND SULBACTAM SODIUM SCH MLS/HR: 2; 1 INJECTION, POWDER, FOR SOLUTION INTRAMUSCULAR; INTRAVENOUS at 14:11

## 2019-01-30 RX ADMIN — PANTOPRAZOLE SODIUM SCH MG: 20 TABLET, DELAYED RELEASE ORAL at 08:00

## 2019-01-30 RX ADMIN — VANCOMYCIN HYDROCHLORIDE SCH MLS/HR: 1 INJECTION, POWDER, LYOPHILIZED, FOR SOLUTION INTRAVENOUS at 09:55

## 2019-01-30 RX ADMIN — AMPICILLIN SODIUM AND SULBACTAM SODIUM SCH MLS/HR: 2; 1 INJECTION, POWDER, FOR SOLUTION INTRAMUSCULAR; INTRAVENOUS at 08:39

## 2019-01-30 RX ADMIN — DIVALPROEX SODIUM SCH MG: 500 TABLET, EXTENDED RELEASE ORAL at 08:36

## 2019-01-30 RX ADMIN — PANTOPRAZOLE SODIUM SCH MG: 20 TABLET, DELAYED RELEASE ORAL at 20:30

## 2019-01-30 RX ADMIN — GABAPENTIN SCH MG: 100 CAPSULE ORAL at 08:36

## 2019-01-31 VITALS — DIASTOLIC BLOOD PRESSURE: 82 MMHG | SYSTOLIC BLOOD PRESSURE: 128 MMHG

## 2019-01-31 VITALS — SYSTOLIC BLOOD PRESSURE: 113 MMHG | DIASTOLIC BLOOD PRESSURE: 73 MMHG

## 2019-01-31 VITALS — DIASTOLIC BLOOD PRESSURE: 79 MMHG | SYSTOLIC BLOOD PRESSURE: 123 MMHG

## 2019-01-31 VITALS — DIASTOLIC BLOOD PRESSURE: 75 MMHG | SYSTOLIC BLOOD PRESSURE: 124 MMHG

## 2019-01-31 LAB
ANION GAP SERPL CALC-SCNC: 7 MMOL/L (ref 5–15)
BASOPHILS # BLD AUTO: 0.07 X10^3/UL (ref 0–0.1)
BASOPHILS NFR BLD AUTO: 1 % (ref 0–1)
CALCIUM SERPL-MCNC: 8.5 MG/DL (ref 8.5–10.1)
CHLORIDE SERPL-SCNC: 105 MMOL/L (ref 98–107)
CREAT SERPL-MCNC: 0.99 MG/DL (ref 0.7–1.3)
EKG IMPRESSION: NORMAL
EOSINOPHIL # BLD AUTO: 0.34 X10^3/UL (ref 0–0.4)
EOSINOPHIL NFR BLD AUTO: 6 % (ref 1–7)
ERYTHROCYTE [DISTWIDTH] IN BLOOD BY AUTOMATED COUNT: 17.3 % (ref 9.4–14.8)
LYMPHOCYTES # BLD AUTO: 1.52 X10^3/UL (ref 1–3.4)
LYMPHOCYTES NFR BLD AUTO: 28 % (ref 22–44)
MCH RBC QN AUTO: 26.5 PG (ref 27.5–34.5)
MCHC RBC AUTO-ENTMCNC: 32.8 G/DL (ref 33.2–36.2)
MCV RBC AUTO: 80.8 FL (ref 81–97)
MD: NO
MONOCYTES # BLD AUTO: 0.85 X10^3/UL (ref 0.2–0.8)
MONOCYTES NFR BLD AUTO: 16 % (ref 2–9)
NEUTROPHILS # BLD AUTO: 2.58 X10^3/UL (ref 1.8–6.8)
NEUTROPHILS NFR BLD AUTO: 48 % (ref 42–75)
PLATELET # BLD AUTO: 627 X10^3/UL (ref 130–400)
PMV BLD AUTO: 7 FL (ref 7.4–10.4)
RBC # BLD AUTO: 3.58 X10^6/UL (ref 4.38–5.82)

## 2019-01-31 RX ADMIN — DIVALPROEX SODIUM SCH MG: 500 TABLET, EXTENDED RELEASE ORAL at 09:46

## 2019-01-31 RX ADMIN — AMPICILLIN SODIUM AND SULBACTAM SODIUM SCH MLS/HR: 2; 1 INJECTION, POWDER, FOR SOLUTION INTRAMUSCULAR; INTRAVENOUS at 21:39

## 2019-01-31 RX ADMIN — FERROUS SULFATE TAB 325 MG (65 MG ELEMENTAL FE) SCH MG: 325 (65 FE) TAB at 15:31

## 2019-01-31 RX ADMIN — ENOXAPARIN SODIUM SCH MG: 40 INJECTION SUBCUTANEOUS at 21:39

## 2019-01-31 RX ADMIN — LACTOBACILLUS TAB SCH TAB: TAB at 09:45

## 2019-01-31 RX ADMIN — LISINOPRIL SCH MG: 5 TABLET ORAL at 09:45

## 2019-01-31 RX ADMIN — GABAPENTIN SCH MG: 100 CAPSULE ORAL at 09:46

## 2019-01-31 RX ADMIN — LACTOBACILLUS TAB SCH TAB: TAB at 15:31

## 2019-01-31 RX ADMIN — GABAPENTIN SCH MG: 100 CAPSULE ORAL at 21:39

## 2019-01-31 RX ADMIN — PANTOPRAZOLE SODIUM SCH MG: 20 TABLET, DELAYED RELEASE ORAL at 09:45

## 2019-01-31 RX ADMIN — AMPICILLIN SODIUM AND SULBACTAM SODIUM SCH MLS/HR: 2; 1 INJECTION, POWDER, FOR SOLUTION INTRAMUSCULAR; INTRAVENOUS at 09:45

## 2019-01-31 RX ADMIN — QUETIAPINE FUMARATE SCH MG: 100 TABLET ORAL at 21:40

## 2019-01-31 RX ADMIN — LACTOBACILLUS TAB SCH TAB: TAB at 21:39

## 2019-01-31 RX ADMIN — VANCOMYCIN HYDROCHLORIDE SCH MLS/HR: 1 INJECTION, POWDER, LYOPHILIZED, FOR SOLUTION INTRAVENOUS at 10:52

## 2019-01-31 RX ADMIN — VANCOMYCIN HYDROCHLORIDE SCH MLS/HR: 1 INJECTION, POWDER, LYOPHILIZED, FOR SOLUTION INTRAVENOUS at 22:59

## 2019-01-31 RX ADMIN — AMPICILLIN SODIUM AND SULBACTAM SODIUM SCH MLS/HR: 2; 1 INJECTION, POWDER, FOR SOLUTION INTRAMUSCULAR; INTRAVENOUS at 02:13

## 2019-01-31 RX ADMIN — PANTOPRAZOLE SODIUM SCH MG: 20 TABLET, DELAYED RELEASE ORAL at 21:39

## 2019-01-31 RX ADMIN — GABAPENTIN SCH MG: 100 CAPSULE ORAL at 15:31

## 2019-01-31 RX ADMIN — AMPICILLIN SODIUM AND SULBACTAM SODIUM SCH MLS/HR: 2; 1 INJECTION, POWDER, FOR SOLUTION INTRAMUSCULAR; INTRAVENOUS at 15:29

## 2019-02-01 VITALS — DIASTOLIC BLOOD PRESSURE: 65 MMHG | SYSTOLIC BLOOD PRESSURE: 125 MMHG

## 2019-02-01 VITALS — DIASTOLIC BLOOD PRESSURE: 76 MMHG | SYSTOLIC BLOOD PRESSURE: 108 MMHG

## 2019-02-01 VITALS — SYSTOLIC BLOOD PRESSURE: 113 MMHG | DIASTOLIC BLOOD PRESSURE: 74 MMHG

## 2019-02-01 VITALS — DIASTOLIC BLOOD PRESSURE: 76 MMHG | SYSTOLIC BLOOD PRESSURE: 119 MMHG

## 2019-02-01 VITALS — SYSTOLIC BLOOD PRESSURE: 126 MMHG | DIASTOLIC BLOOD PRESSURE: 77 MMHG

## 2019-02-01 LAB
ANION GAP SERPL CALC-SCNC: 5 MMOL/L (ref 5–15)
BASOPHILS # BLD AUTO: 0.04 X10^3/UL (ref 0–0.1)
BASOPHILS NFR BLD AUTO: 1 % (ref 0–1)
CALCIUM SERPL-MCNC: 8.5 MG/DL (ref 8.5–10.1)
CHLORIDE SERPL-SCNC: 103 MMOL/L (ref 98–107)
CREAT SERPL-MCNC: 0.91 MG/DL (ref 0.7–1.3)
EOSINOPHIL # BLD AUTO: 0.37 X10^3/UL (ref 0–0.4)
EOSINOPHIL NFR BLD AUTO: 6 % (ref 1–7)
ERYTHROCYTE [DISTWIDTH] IN BLOOD BY AUTOMATED COUNT: 16.9 % (ref 9.4–14.8)
LYMPHOCYTES # BLD AUTO: 1.59 X10^3/UL (ref 1–3.4)
LYMPHOCYTES NFR BLD AUTO: 26 % (ref 22–44)
MCH RBC QN AUTO: 26.6 PG (ref 27.5–34.5)
MCHC RBC AUTO-ENTMCNC: 32.5 G/DL (ref 33.2–36.2)
MCV RBC AUTO: 81.6 FL (ref 81–97)
MD: NO
MONOCYTES # BLD AUTO: 1.06 X10^3/UL (ref 0.2–0.8)
MONOCYTES NFR BLD AUTO: 17 % (ref 2–9)
NEUTROPHILS # BLD AUTO: 3.14 X10^3/UL (ref 1.8–6.8)
NEUTROPHILS NFR BLD AUTO: 51 % (ref 42–75)
PLATELET # BLD AUTO: 607 X10^3/UL (ref 130–400)
PMV BLD AUTO: 7.1 FL (ref 7.4–10.4)
RBC # BLD AUTO: 3.65 X10^6/UL (ref 4.38–5.82)

## 2019-02-01 RX ADMIN — QUETIAPINE FUMARATE SCH MG: 100 TABLET ORAL at 20:23

## 2019-02-01 RX ADMIN — LISINOPRIL SCH MG: 5 TABLET ORAL at 09:10

## 2019-02-01 RX ADMIN — DIVALPROEX SODIUM SCH MG: 500 TABLET, EXTENDED RELEASE ORAL at 09:10

## 2019-02-01 RX ADMIN — FERROUS SULFATE TAB 325 MG (65 MG ELEMENTAL FE) SCH MG: 325 (65 FE) TAB at 15:53

## 2019-02-01 RX ADMIN — AMPICILLIN SODIUM AND SULBACTAM SODIUM SCH MLS/HR: 2; 1 INJECTION, POWDER, FOR SOLUTION INTRAMUSCULAR; INTRAVENOUS at 11:14

## 2019-02-01 RX ADMIN — AMPICILLIN SODIUM AND SULBACTAM SODIUM SCH MLS/HR: 2; 1 INJECTION, POWDER, FOR SOLUTION INTRAMUSCULAR; INTRAVENOUS at 04:56

## 2019-02-01 RX ADMIN — LACTOBACILLUS TAB SCH TAB: TAB at 15:52

## 2019-02-01 RX ADMIN — PANTOPRAZOLE SODIUM SCH MG: 20 TABLET, DELAYED RELEASE ORAL at 09:10

## 2019-02-01 RX ADMIN — GABAPENTIN SCH MG: 100 CAPSULE ORAL at 09:10

## 2019-02-01 RX ADMIN — LACTOBACILLUS TAB SCH TAB: TAB at 09:10

## 2019-02-01 RX ADMIN — GABAPENTIN SCH MG: 100 CAPSULE ORAL at 15:53

## 2019-02-01 RX ADMIN — PANTOPRAZOLE SODIUM SCH MG: 20 TABLET, DELAYED RELEASE ORAL at 20:23

## 2019-02-01 RX ADMIN — ENOXAPARIN SODIUM SCH MG: 40 INJECTION SUBCUTANEOUS at 20:23

## 2019-02-01 RX ADMIN — AMPICILLIN SODIUM AND SULBACTAM SODIUM SCH MLS/HR: 2; 1 INJECTION, POWDER, FOR SOLUTION INTRAMUSCULAR; INTRAVENOUS at 18:08

## 2019-02-01 RX ADMIN — GABAPENTIN SCH MG: 100 CAPSULE ORAL at 20:22

## 2019-02-01 RX ADMIN — LACTOBACILLUS TAB SCH TAB: TAB at 20:22

## 2019-02-02 VITALS — DIASTOLIC BLOOD PRESSURE: 78 MMHG | SYSTOLIC BLOOD PRESSURE: 144 MMHG

## 2019-02-02 VITALS — SYSTOLIC BLOOD PRESSURE: 125 MMHG | DIASTOLIC BLOOD PRESSURE: 70 MMHG

## 2019-02-02 RX ADMIN — AMPICILLIN SODIUM AND SULBACTAM SODIUM SCH MLS/HR: 2; 1 INJECTION, POWDER, FOR SOLUTION INTRAMUSCULAR; INTRAVENOUS at 00:02

## 2019-02-02 RX ADMIN — LISINOPRIL SCH MG: 5 TABLET ORAL at 08:34

## 2019-02-02 RX ADMIN — LACTOBACILLUS TAB SCH TAB: TAB at 08:35

## 2019-02-02 RX ADMIN — GABAPENTIN SCH MG: 100 CAPSULE ORAL at 08:35

## 2019-02-02 RX ADMIN — AMPICILLIN SODIUM AND SULBACTAM SODIUM SCH MLS/HR: 2; 1 INJECTION, POWDER, FOR SOLUTION INTRAMUSCULAR; INTRAVENOUS at 05:17

## 2019-02-02 RX ADMIN — PANTOPRAZOLE SODIUM SCH MG: 20 TABLET, DELAYED RELEASE ORAL at 08:34

## 2019-02-02 RX ADMIN — DIVALPROEX SODIUM SCH MG: 500 TABLET, EXTENDED RELEASE ORAL at 08:35

## 2019-02-07 ENCOUNTER — HOSPITAL ENCOUNTER (EMERGENCY)
Facility: MEDICAL CENTER | Age: 64
End: 2019-02-07
Attending: EMERGENCY MEDICINE
Payer: MEDICAID

## 2019-02-07 VITALS
TEMPERATURE: 98.6 F | BODY MASS INDEX: 19.51 KG/M2 | HEART RATE: 108 BPM | WEIGHT: 136.24 LBS | HEIGHT: 70 IN | DIASTOLIC BLOOD PRESSURE: 77 MMHG | OXYGEN SATURATION: 94 % | SYSTOLIC BLOOD PRESSURE: 118 MMHG | RESPIRATION RATE: 20 BRPM

## 2019-02-07 DIAGNOSIS — S80.812A ABRASION OF LEFT LOWER EXTREMITY, INITIAL ENCOUNTER: ICD-10-CM

## 2019-02-07 PROCEDURE — 303484 HCHG DRESSING LARGE

## 2019-02-07 PROCEDURE — A9270 NON-COVERED ITEM OR SERVICE: HCPCS | Performed by: EMERGENCY MEDICINE

## 2019-02-07 PROCEDURE — 700102 HCHG RX REV CODE 250 W/ 637 OVERRIDE(OP): Performed by: EMERGENCY MEDICINE

## 2019-02-07 PROCEDURE — 99284 EMERGENCY DEPT VISIT MOD MDM: CPT

## 2019-02-07 RX ORDER — IBUPROFEN 600 MG/1
600 TABLET ORAL ONCE
Status: COMPLETED | OUTPATIENT
Start: 2019-02-07 | End: 2019-02-07

## 2019-02-07 RX ADMIN — IBUPROFEN 600 MG: 600 TABLET ORAL at 18:45

## 2019-02-07 ASSESSMENT — PAIN DESCRIPTION - DESCRIPTORS: DESCRIPTORS: ACHING

## 2019-02-08 NOTE — ED TRIAGE NOTES
Pt BIB EMS in wheelchair  Chief Complaint   Patient presents with   • T-5000 GLF     3 days ago on ice   • Leg Pain     left leg    Pt asked to wait in lobby, pt updated on triage process and pt asked to inform RN of any changes.

## 2019-02-08 NOTE — ED PROVIDER NOTES
ED Provider Note    Scribed for Dr. Osvaldo Beltran M.D. by Ariel Spence. 2/7/2019  5:18 PM    Primary care provider: No primary care provider on file.  Means of arrival: EMS  History obtained from: Patient  History limited by: None    CHIEF COMPLAINT  Chief Complaint   Patient presents with   • T-5000 GLF     3 days ago on ice   • Leg Pain     left leg        HPI  Bola Varela is a 63 y.o. male who presents to the Emergency Department via EMS complaining of worsening left leg pain with wound status post a ground level fall on ice onset 3 days ago. He has been able to bear weight on the extremity, or clean the wound as he is currently homeless. He denies any fever.    REVIEW OF SYSTEMS  Pertinent positives include left leg pain,with wound. Pertinent negatives include no fever. See HPI for further details.     PAST MEDICAL HISTORY   has a past medical history of Psychiatric problem; Restless leg; and Tobacco use.    SURGICAL HISTORY   has a past surgical history that includes ankle orif (Right).    SOCIAL HISTORY  Social History   Substance Use Topics   • Smoking status: Current Every Day Smoker     Packs/day: 0.30     Years: 40.00     Types: Cigarettes   • Smokeless tobacco: Never Used      Comment: 1 ppd until few years ago   • Alcohol use No      Comment: previous heavy alcohol use, on and off      History   Drug Use   • Types: Marijuana, Inhaled     Comment: marijuana daily        FAMILY HISTORY  Family History   Problem Relation Age of Onset   • Heart Disease Mother    • No Known Problems Father    • Cancer Neg Hx    • Diabetes Neg Hx    • Stroke Neg Hx        CURRENT MEDICATIONS  Home Medications     Reviewed by Marci Singh R.N. (Registered Nurse) on 02/07/19 at 1637  Med List Status: <None>   Medication Last Dose Status        Patient Berhane Taking any Medications                       ALLERGIES  No Known Allergies    PHYSICAL EXAM  VITAL SIGNS: /77   Pulse (!) 108   Temp 37 °C (98.6 °F)  "(Temporal)   Resp 20   Ht 1.778 m (5' 10\")   Wt 61.8 kg (136 lb 3.9 oz)   SpO2 94%   BMI 19.55 kg/m²     Constitutional: Well developed, Well nourished, no distress, disheveled Non-toxic appearance.      Lymphatic: No lymphadenopathy noted.      Skin: Large deep abrasion to lower left medial leg. Weeping but not infected. Warm, Dry, No erythema, No rash.   Extremities:, No edema No cyanosis.   Musculoskeletal: No tenderness to palpation or major deformities noted.  Intact distal pulses  Neurologic: Awake, alert. Moves all extremities spontaneously.  Psychiatric: Affect normal, Judgment normal, Mood normal.       COURSE & MEDICAL DECISION MAKING  Pertinent Labs & Imaging studies reviewed. (See chart for details)    5:18 PM - Patient seen and examined at bedside.       Decision Making:  Old abrasion noted to the left lower leg no evidence of secondary infection or other complication will clean and dress the large abrasion    FINAL IMPRESSION  Abrasion     Ariel ODOM (Scribe), am scribing for, and in the presence of, Osvaldo Beltran M.D..    Electronically signed by: Ariel Spence (Maral), 2/7/2019    Osvaldo ODOM M.D. personally performed the services described in this documentation, as scribed by Ariel Spence in my presence, and it is both accurate and complete. E.     The note accurately reflects work and decisions made by me.  Osvaldo Beltran  2/7/2019  5:29 PM    "

## 2019-02-10 ENCOUNTER — HOSPITAL ENCOUNTER (EMERGENCY)
Dept: HOSPITAL 8 - ED | Age: 64
Discharge: HOME | End: 2019-02-10
Payer: MEDICAID

## 2019-02-10 VITALS — BODY MASS INDEX: 21.46 KG/M2 | HEIGHT: 70 IN | WEIGHT: 149.91 LBS

## 2019-02-10 VITALS — SYSTOLIC BLOOD PRESSURE: 127 MMHG | DIASTOLIC BLOOD PRESSURE: 61 MMHG

## 2019-02-10 DIAGNOSIS — L03.116: Primary | ICD-10-CM

## 2019-02-10 DIAGNOSIS — Z72.9: ICD-10-CM

## 2019-02-10 DIAGNOSIS — I10: ICD-10-CM

## 2019-02-10 LAB
ALBUMIN SERPL-MCNC: 2.9 G/DL (ref 3.4–5)
ALP SERPL-CCNC: 143 U/L (ref 45–117)
ALT SERPL-CCNC: 17 U/L (ref 12–78)
ANION GAP SERPL CALC-SCNC: 8 MMOL/L (ref 5–15)
BASOPHILS # BLD AUTO: 0.13 X10^3/UL (ref 0–0.1)
BASOPHILS NFR BLD AUTO: 2 % (ref 0–1)
BILIRUB SERPL-MCNC: 0.4 MG/DL (ref 0.2–1)
CALCIUM SERPL-MCNC: 8.6 MG/DL (ref 8.5–10.1)
CHLORIDE SERPL-SCNC: 103 MMOL/L (ref 98–107)
CREAT SERPL-MCNC: 0.84 MG/DL (ref 0.7–1.3)
EOSINOPHIL # BLD AUTO: 0.06 X10^3/UL (ref 0–0.4)
EOSINOPHIL NFR BLD AUTO: 1 % (ref 1–7)
ERYTHROCYTE [DISTWIDTH] IN BLOOD BY AUTOMATED COUNT: 18.9 % (ref 9.4–14.8)
LYMPHOCYTES # BLD AUTO: 0.99 X10^3/UL (ref 1–3.4)
LYMPHOCYTES NFR BLD AUTO: 13 % (ref 22–44)
MCH RBC QN AUTO: 25.9 PG (ref 27.5–34.5)
MCHC RBC AUTO-ENTMCNC: 32 G/DL (ref 33.2–36.2)
MCV RBC AUTO: 80.8 FL (ref 81–97)
MD: NO
MONOCYTES # BLD AUTO: 0.9 X10^3/UL (ref 0.2–0.8)
MONOCYTES NFR BLD AUTO: 12 % (ref 2–9)
NEUTROPHILS # BLD AUTO: 5.73 X10^3/UL (ref 1.8–6.8)
NEUTROPHILS NFR BLD AUTO: 74 % (ref 42–75)
PLATELET # BLD AUTO: 591 X10^3/UL (ref 130–400)
PMV BLD AUTO: 7.6 FL (ref 7.4–10.4)
PROT SERPL-MCNC: 7.9 G/DL (ref 6.4–8.2)
RBC # BLD AUTO: 3.86 X10^6/UL (ref 4.38–5.82)

## 2019-02-10 PROCEDURE — 84145 PROCALCITONIN (PCT): CPT

## 2019-02-10 PROCEDURE — 96366 THER/PROPH/DIAG IV INF ADDON: CPT

## 2019-02-10 PROCEDURE — 83605 ASSAY OF LACTIC ACID: CPT

## 2019-02-10 PROCEDURE — 93971 EXTREMITY STUDY: CPT

## 2019-02-10 PROCEDURE — 87040 BLOOD CULTURE FOR BACTERIA: CPT

## 2019-02-10 PROCEDURE — 99284 EMERGENCY DEPT VISIT MOD MDM: CPT

## 2019-02-10 PROCEDURE — 85025 COMPLETE CBC W/AUTO DIFF WBC: CPT

## 2019-02-10 PROCEDURE — 36415 COLL VENOUS BLD VENIPUNCTURE: CPT

## 2019-02-10 PROCEDURE — 96365 THER/PROPH/DIAG IV INF INIT: CPT

## 2019-02-10 PROCEDURE — 80053 COMPREHEN METABOLIC PANEL: CPT

## 2019-02-16 ENCOUNTER — HOSPITAL ENCOUNTER (INPATIENT)
Dept: HOSPITAL 8 - ED | Age: 64
LOS: 7 days | Discharge: LEFT BEFORE DISCHARGE | DRG: 492 | End: 2019-02-23
Attending: INTERNAL MEDICINE | Admitting: INTERNAL MEDICINE
Payer: MEDICAID

## 2019-02-16 VITALS — HEIGHT: 71 IN | WEIGHT: 149.91 LBS | BODY MASS INDEX: 20.99 KG/M2

## 2019-02-16 DIAGNOSIS — D50.9: ICD-10-CM

## 2019-02-16 DIAGNOSIS — I10: ICD-10-CM

## 2019-02-16 DIAGNOSIS — K21.9: ICD-10-CM

## 2019-02-16 DIAGNOSIS — M72.9: ICD-10-CM

## 2019-02-16 DIAGNOSIS — F12.90: ICD-10-CM

## 2019-02-16 DIAGNOSIS — Z53.21: ICD-10-CM

## 2019-02-16 DIAGNOSIS — E43: ICD-10-CM

## 2019-02-16 DIAGNOSIS — Z91.19: ICD-10-CM

## 2019-02-16 DIAGNOSIS — M86.262: Primary | ICD-10-CM
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
## 2019-02-16 DIAGNOSIS — Z59.0: ICD-10-CM

## 2019-02-16 DIAGNOSIS — F10.20: ICD-10-CM

## 2019-02-16 DIAGNOSIS — L97.829: ICD-10-CM

## 2019-02-16 DIAGNOSIS — F17.200: ICD-10-CM

## 2019-02-16 DIAGNOSIS — L03.116: ICD-10-CM

## 2019-02-16 DIAGNOSIS — E83.42: ICD-10-CM

## 2019-02-16 LAB
ALBUMIN SERPL-MCNC: 2.7 G/DL (ref 3.4–5)
ALP SERPL-CCNC: 125 U/L (ref 45–117)
ALT SERPL-CCNC: 17 U/L (ref 12–78)
ANION GAP SERPL CALC-SCNC: 9 MMOL/L (ref 5–15)
BASOPHILS # BLD AUTO: 0.07 X10^3/UL (ref 0–0.1)
BASOPHILS NFR BLD AUTO: 1 % (ref 0–1)
BILIRUB SERPL-MCNC: 0.3 MG/DL (ref 0.2–1)
CALCIUM SERPL-MCNC: 8.3 MG/DL (ref 8.5–10.1)
CHLORIDE SERPL-SCNC: 105 MMOL/L (ref 98–107)
CREAT SERPL-MCNC: 0.82 MG/DL (ref 0.7–1.3)
EOSINOPHIL # BLD AUTO: 0.17 X10^3/UL (ref 0–0.4)
EOSINOPHIL NFR BLD AUTO: 3 % (ref 1–7)
ERYTHROCYTE [DISTWIDTH] IN BLOOD BY AUTOMATED COUNT: 18.5 % (ref 9.4–14.8)
LYMPHOCYTES # BLD AUTO: 1.9 X10^3/UL (ref 1–3.4)
LYMPHOCYTES NFR BLD AUTO: 31 % (ref 22–44)
MCH RBC QN AUTO: 26 PG (ref 27.5–34.5)
MCHC RBC AUTO-ENTMCNC: 32.1 G/DL (ref 33.2–36.2)
MCV RBC AUTO: 81.1 FL (ref 81–97)
MD: NO
MONOCYTES # BLD AUTO: 1.05 X10^3/UL (ref 0.2–0.8)
MONOCYTES NFR BLD AUTO: 17 % (ref 2–9)
NEUTROPHILS # BLD AUTO: 3.01 X10^3/UL (ref 1.8–6.8)
NEUTROPHILS NFR BLD AUTO: 49 % (ref 42–75)
PLATELET # BLD AUTO: 485 X10^3/UL (ref 130–400)
PMV BLD AUTO: 6.8 FL (ref 7.4–10.4)
PROT SERPL-MCNC: 8.3 G/DL (ref 6.4–8.2)
RBC # BLD AUTO: 3.94 X10^6/UL (ref 4.38–5.82)

## 2019-02-16 PROCEDURE — 36415 COLL VENOUS BLD VENIPUNCTURE: CPT

## 2019-02-16 PROCEDURE — 87206 SMEAR FLUORESCENT/ACID STAI: CPT

## 2019-02-16 PROCEDURE — 80202 ASSAY OF VANCOMYCIN: CPT

## 2019-02-16 PROCEDURE — 87116 MYCOBACTERIA CULTURE: CPT

## 2019-02-16 PROCEDURE — 71250 CT THORAX DX C-: CPT

## 2019-02-16 PROCEDURE — 82977 ASSAY OF GGT: CPT

## 2019-02-16 PROCEDURE — 87186 SC STD MICRODIL/AGAR DIL: CPT

## 2019-02-16 PROCEDURE — 83735 ASSAY OF MAGNESIUM: CPT

## 2019-02-16 PROCEDURE — 99285 EMERGENCY DEPT VISIT HI MDM: CPT

## 2019-02-16 PROCEDURE — 71045 X-RAY EXAM CHEST 1 VIEW: CPT

## 2019-02-16 PROCEDURE — 83540 ASSAY OF IRON: CPT

## 2019-02-16 PROCEDURE — 87102 FUNGUS ISOLATION CULTURE: CPT

## 2019-02-16 PROCEDURE — 87015 SPECIMEN INFECT AGNT CONCNTJ: CPT

## 2019-02-16 PROCEDURE — 80048 BASIC METABOLIC PNL TOTAL CA: CPT

## 2019-02-16 PROCEDURE — 87040 BLOOD CULTURE FOR BACTERIA: CPT

## 2019-02-16 PROCEDURE — 82565 ASSAY OF CREATININE: CPT

## 2019-02-16 PROCEDURE — 87077 CULTURE AEROBIC IDENTIFY: CPT

## 2019-02-16 PROCEDURE — 84100 ASSAY OF PHOSPHORUS: CPT

## 2019-02-16 PROCEDURE — 86140 C-REACTIVE PROTEIN: CPT

## 2019-02-16 PROCEDURE — 85025 COMPLETE CBC W/AUTO DIFF WBC: CPT

## 2019-02-16 PROCEDURE — 87176 TISSUE HOMOGENIZATION CULTR: CPT

## 2019-02-16 PROCEDURE — 83550 IRON BINDING TEST: CPT

## 2019-02-16 PROCEDURE — 85651 RBC SED RATE NONAUTOMATED: CPT

## 2019-02-16 PROCEDURE — 87070 CULTURE OTHR SPECIMN AEROBIC: CPT

## 2019-02-16 PROCEDURE — 82040 ASSAY OF SERUM ALBUMIN: CPT

## 2019-02-16 PROCEDURE — 87147 CULTURE TYPE IMMUNOLOGIC: CPT

## 2019-02-16 PROCEDURE — 87075 CULTR BACTERIA EXCEPT BLOOD: CPT

## 2019-02-16 PROCEDURE — 87205 SMEAR GRAM STAIN: CPT

## 2019-02-16 PROCEDURE — 80053 COMPREHEN METABOLIC PANEL: CPT

## 2019-02-16 PROCEDURE — 96374 THER/PROPH/DIAG INJ IV PUSH: CPT

## 2019-02-16 SDOH — ECONOMIC STABILITY - HOUSING INSECURITY: HOMELESSNESS: Z59.0

## 2019-02-17 VITALS — DIASTOLIC BLOOD PRESSURE: 70 MMHG | SYSTOLIC BLOOD PRESSURE: 128 MMHG

## 2019-02-17 VITALS — DIASTOLIC BLOOD PRESSURE: 68 MMHG | SYSTOLIC BLOOD PRESSURE: 125 MMHG

## 2019-02-17 VITALS — DIASTOLIC BLOOD PRESSURE: 64 MMHG | SYSTOLIC BLOOD PRESSURE: 134 MMHG

## 2019-02-17 VITALS — SYSTOLIC BLOOD PRESSURE: 120 MMHG | DIASTOLIC BLOOD PRESSURE: 62 MMHG

## 2019-02-17 RX ADMIN — ENOXAPARIN SODIUM SCH MG: 40 INJECTION SUBCUTANEOUS at 09:23

## 2019-02-17 RX ADMIN — AMPICILLIN SODIUM AND SULBACTAM SODIUM SCH MLS/HR: 2; 1 INJECTION, POWDER, FOR SOLUTION INTRAMUSCULAR; INTRAVENOUS at 06:21

## 2019-02-17 RX ADMIN — SODIUM CHLORIDE, SODIUM LACTATE, POTASSIUM CHLORIDE, AND CALCIUM CHLORIDE SCH MLS/HR: .6; .31; .03; .02 INJECTION, SOLUTION INTRAVENOUS at 01:43

## 2019-02-17 RX ADMIN — SODIUM CHLORIDE, SODIUM LACTATE, POTASSIUM CHLORIDE, AND CALCIUM CHLORIDE SCH MLS/HR: .6; .31; .03; .02 INJECTION, SOLUTION INTRAVENOUS at 21:00

## 2019-02-17 RX ADMIN — MORPHINE SULFATE PRN MG: 10 INJECTION INTRAVENOUS at 14:05

## 2019-02-17 RX ADMIN — MORPHINE SULFATE PRN MG: 10 INJECTION INTRAVENOUS at 18:15

## 2019-02-17 RX ADMIN — MORPHINE SULFATE PRN MG: 10 INJECTION INTRAVENOUS at 01:43

## 2019-02-17 RX ADMIN — SODIUM CHLORIDE, SODIUM LACTATE, POTASSIUM CHLORIDE, AND CALCIUM CHLORIDE SCH MLS/HR: .6; .31; .03; .02 INJECTION, SOLUTION INTRAVENOUS at 11:58

## 2019-02-17 RX ADMIN — AMPICILLIN SODIUM AND SULBACTAM SODIUM SCH MLS/HR: 2; 1 INJECTION, POWDER, FOR SOLUTION INTRAMUSCULAR; INTRAVENOUS at 11:58

## 2019-02-17 RX ADMIN — VANCOMYCIN HYDROCHLORIDE SCH MLS/HR: 1 INJECTION, POWDER, LYOPHILIZED, FOR SOLUTION INTRAVENOUS at 18:00

## 2019-02-17 RX ADMIN — AMPICILLIN SODIUM AND SULBACTAM SODIUM SCH MLS/HR: 2; 1 INJECTION, POWDER, FOR SOLUTION INTRAMUSCULAR; INTRAVENOUS at 20:52

## 2019-02-18 VITALS — SYSTOLIC BLOOD PRESSURE: 154 MMHG | DIASTOLIC BLOOD PRESSURE: 82 MMHG

## 2019-02-18 VITALS — SYSTOLIC BLOOD PRESSURE: 144 MMHG | DIASTOLIC BLOOD PRESSURE: 79 MMHG

## 2019-02-18 VITALS — SYSTOLIC BLOOD PRESSURE: 117 MMHG | DIASTOLIC BLOOD PRESSURE: 82 MMHG

## 2019-02-18 VITALS — SYSTOLIC BLOOD PRESSURE: 123 MMHG | DIASTOLIC BLOOD PRESSURE: 65 MMHG

## 2019-02-18 VITALS — DIASTOLIC BLOOD PRESSURE: 76 MMHG | SYSTOLIC BLOOD PRESSURE: 133 MMHG

## 2019-02-18 LAB
ALBUMIN SERPL-MCNC: 2.1 G/DL (ref 3.4–5)
ANION GAP SERPL CALC-SCNC: 7 MMOL/L (ref 5–15)
BASOPHILS # BLD AUTO: 0.04 X10^3/UL (ref 0–0.1)
BASOPHILS NFR BLD AUTO: 1 % (ref 0–1)
CALCIUM SERPL-MCNC: 7.4 MG/DL (ref 8.5–10.1)
CHLORIDE SERPL-SCNC: 107 MMOL/L (ref 98–107)
CREAT SERPL-MCNC: 0.67 MG/DL (ref 0.7–1.3)
EOSINOPHIL # BLD AUTO: 0.17 X10^3/UL (ref 0–0.4)
EOSINOPHIL NFR BLD AUTO: 2 % (ref 1–7)
ERYTHROCYTE [DISTWIDTH] IN BLOOD BY AUTOMATED COUNT: 18.1 % (ref 9.4–14.8)
GAMMA GLUTAMYL TRANSPEPTIDASE: 28 U/L (ref 15–85)
LYMPHOCYTES # BLD AUTO: 1.15 X10^3/UL (ref 1–3.4)
LYMPHOCYTES NFR BLD AUTO: 13 % (ref 22–44)
MCH RBC QN AUTO: 26.7 PG (ref 27.5–34.5)
MCHC RBC AUTO-ENTMCNC: 32.5 G/DL (ref 33.2–36.2)
MCV RBC AUTO: 82 FL (ref 81–97)
MD: NO
MONOCYTES # BLD AUTO: 1.05 X10^3/UL (ref 0.2–0.8)
MONOCYTES NFR BLD AUTO: 11 % (ref 2–9)
NEUTROPHILS # BLD AUTO: 6.78 X10^3/UL (ref 1.8–6.8)
NEUTROPHILS NFR BLD AUTO: 74 % (ref 42–75)
PLATELET # BLD AUTO: 405 X10^3/UL (ref 130–400)
PMV BLD AUTO: 6.9 FL (ref 7.4–10.4)
RBC # BLD AUTO: 3.48 X10^6/UL (ref 4.38–5.82)

## 2019-02-18 RX ADMIN — BENZONATATE SCH MG: 100 CAPSULE ORAL at 09:00

## 2019-02-18 RX ADMIN — ENOXAPARIN SODIUM SCH MG: 40 INJECTION SUBCUTANEOUS at 07:54

## 2019-02-18 RX ADMIN — MORPHINE SULFATE PRN MG: 10 INJECTION INTRAVENOUS at 04:13

## 2019-02-18 RX ADMIN — MORPHINE SULFATE PRN MG: 10 INJECTION INTRAVENOUS at 16:28

## 2019-02-18 RX ADMIN — AMPICILLIN SODIUM AND SULBACTAM SODIUM SCH MLS/HR: 2; 1 INJECTION, POWDER, FOR SOLUTION INTRAMUSCULAR; INTRAVENOUS at 14:08

## 2019-02-18 RX ADMIN — BENZONATATE SCH MG: 100 CAPSULE ORAL at 20:43

## 2019-02-18 RX ADMIN — BENZONATATE SCH MG: 100 CAPSULE ORAL at 16:34

## 2019-02-18 RX ADMIN — AMPICILLIN SODIUM AND SULBACTAM SODIUM SCH MLS/HR: 2; 1 INJECTION, POWDER, FOR SOLUTION INTRAMUSCULAR; INTRAVENOUS at 02:03

## 2019-02-18 RX ADMIN — MORPHINE SULFATE PRN MG: 10 INJECTION INTRAVENOUS at 21:00

## 2019-02-18 RX ADMIN — MORPHINE SULFATE PRN MG: 10 INJECTION INTRAVENOUS at 11:40

## 2019-02-18 RX ADMIN — SODIUM CHLORIDE, SODIUM LACTATE, POTASSIUM CHLORIDE, AND CALCIUM CHLORIDE SCH MLS/HR: .6; .31; .03; .02 INJECTION, SOLUTION INTRAVENOUS at 07:00

## 2019-02-18 RX ADMIN — VANCOMYCIN HYDROCHLORIDE SCH MLS/HR: 1 INJECTION, POWDER, LYOPHILIZED, FOR SOLUTION INTRAVENOUS at 12:10

## 2019-02-18 RX ADMIN — AMPICILLIN SODIUM AND SULBACTAM SODIUM SCH MLS/HR: 2; 1 INJECTION, POWDER, FOR SOLUTION INTRAMUSCULAR; INTRAVENOUS at 07:54

## 2019-02-18 RX ADMIN — AMPICILLIN SODIUM AND SULBACTAM SODIUM SCH MLS/HR: 2; 1 INJECTION, POWDER, FOR SOLUTION INTRAMUSCULAR; INTRAVENOUS at 20:43

## 2019-02-19 VITALS — SYSTOLIC BLOOD PRESSURE: 153 MMHG | DIASTOLIC BLOOD PRESSURE: 84 MMHG

## 2019-02-19 VITALS — SYSTOLIC BLOOD PRESSURE: 143 MMHG | DIASTOLIC BLOOD PRESSURE: 85 MMHG

## 2019-02-19 VITALS — DIASTOLIC BLOOD PRESSURE: 102 MMHG | SYSTOLIC BLOOD PRESSURE: 157 MMHG

## 2019-02-19 VITALS — SYSTOLIC BLOOD PRESSURE: 159 MMHG | DIASTOLIC BLOOD PRESSURE: 90 MMHG

## 2019-02-19 VITALS — DIASTOLIC BLOOD PRESSURE: 98 MMHG | SYSTOLIC BLOOD PRESSURE: 162 MMHG

## 2019-02-19 VITALS — DIASTOLIC BLOOD PRESSURE: 79 MMHG | SYSTOLIC BLOOD PRESSURE: 156 MMHG

## 2019-02-19 LAB
BASOPHILS # BLD AUTO: 0.06 X10^3/UL (ref 0–0.1)
BASOPHILS NFR BLD AUTO: 1 % (ref 0–1)
EOSINOPHIL # BLD AUTO: 0.22 X10^3/UL (ref 0–0.4)
EOSINOPHIL NFR BLD AUTO: 4 % (ref 1–7)
ERYTHROCYTE [DISTWIDTH] IN BLOOD BY AUTOMATED COUNT: 18.1 % (ref 9.4–14.8)
LYMPHOCYTES # BLD AUTO: 1.56 X10^3/UL (ref 1–3.4)
LYMPHOCYTES NFR BLD AUTO: 28 % (ref 22–44)
MCH RBC QN AUTO: 26.6 PG (ref 27.5–34.5)
MCHC RBC AUTO-ENTMCNC: 32.4 G/DL (ref 33.2–36.2)
MCV RBC AUTO: 81.9 FL (ref 81–97)
MD: NO
MONOCYTES # BLD AUTO: 0.67 X10^3/UL (ref 0.2–0.8)
MONOCYTES NFR BLD AUTO: 12 % (ref 2–9)
NEUTROPHILS # BLD AUTO: 2.99 X10^3/UL (ref 1.8–6.8)
NEUTROPHILS NFR BLD AUTO: 54 % (ref 42–75)
PLATELET # BLD AUTO: 441 X10^3/UL (ref 130–400)
PMV BLD AUTO: 6.9 FL (ref 7.4–10.4)
RBC # BLD AUTO: 3.64 X10^6/UL (ref 4.38–5.82)
VANCOMYCIN,TROUGH: 11 MCG/ML (ref 5–10)

## 2019-02-19 PROCEDURE — 0QBH0ZZ EXCISION OF LEFT TIBIA, OPEN APPROACH: ICD-10-PCS | Performed by: ORTHOPAEDIC SURGERY

## 2019-02-19 RX ADMIN — AMPICILLIN SODIUM AND SULBACTAM SODIUM SCH MLS/HR: 2; 1 INJECTION, POWDER, FOR SOLUTION INTRAMUSCULAR; INTRAVENOUS at 19:59

## 2019-02-19 RX ADMIN — MORPHINE SULFATE PRN MG: 10 INJECTION INTRAVENOUS at 13:42

## 2019-02-19 RX ADMIN — BENZONATATE SCH MG: 100 CAPSULE ORAL at 08:42

## 2019-02-19 RX ADMIN — ENOXAPARIN SODIUM SCH MG: 40 INJECTION SUBCUTANEOUS at 08:43

## 2019-02-19 RX ADMIN — BENZONATATE SCH MG: 100 CAPSULE ORAL at 19:59

## 2019-02-19 RX ADMIN — VANCOMYCIN HYDROCHLORIDE SCH MLS/HR: 1 INJECTION, POWDER, LYOPHILIZED, FOR SOLUTION INTRAVENOUS at 23:54

## 2019-02-19 RX ADMIN — MORPHINE SULFATE PRN MG: 10 INJECTION INTRAVENOUS at 02:21

## 2019-02-19 RX ADMIN — VANCOMYCIN HYDROCHLORIDE SCH MLS/HR: 1 INJECTION, POWDER, LYOPHILIZED, FOR SOLUTION INTRAVENOUS at 06:00

## 2019-02-19 RX ADMIN — AMPICILLIN SODIUM AND SULBACTAM SODIUM SCH MLS/HR: 2; 1 INJECTION, POWDER, FOR SOLUTION INTRAMUSCULAR; INTRAVENOUS at 08:59

## 2019-02-19 RX ADMIN — MORPHINE SULFATE PRN MG: 10 INJECTION INTRAVENOUS at 10:19

## 2019-02-19 RX ADMIN — MORPHINE SULFATE PRN MG: 10 INJECTION INTRAVENOUS at 06:47

## 2019-02-19 RX ADMIN — BENZONATATE SCH MG: 100 CAPSULE ORAL at 16:00

## 2019-02-19 RX ADMIN — AMPICILLIN SODIUM AND SULBACTAM SODIUM SCH MLS/HR: 2; 1 INJECTION, POWDER, FOR SOLUTION INTRAMUSCULAR; INTRAVENOUS at 02:21

## 2019-02-20 VITALS — SYSTOLIC BLOOD PRESSURE: 145 MMHG | DIASTOLIC BLOOD PRESSURE: 90 MMHG

## 2019-02-20 VITALS — DIASTOLIC BLOOD PRESSURE: 82 MMHG | SYSTOLIC BLOOD PRESSURE: 149 MMHG

## 2019-02-20 VITALS — DIASTOLIC BLOOD PRESSURE: 64 MMHG | SYSTOLIC BLOOD PRESSURE: 155 MMHG

## 2019-02-20 VITALS — SYSTOLIC BLOOD PRESSURE: 139 MMHG | DIASTOLIC BLOOD PRESSURE: 98 MMHG

## 2019-02-20 LAB
% IRON SATURATION: 9 % (ref 20–55)
IRON LEVEL: 23 MCG/DL (ref 65–175)
TIBC SERPL-MCNC: 256 MCG/DL (ref 250–450)

## 2019-02-20 RX ADMIN — AMPICILLIN SODIUM AND SULBACTAM SODIUM SCH MLS/HR: 2; 1 INJECTION, POWDER, FOR SOLUTION INTRAMUSCULAR; INTRAVENOUS at 07:46

## 2019-02-20 RX ADMIN — AMPICILLIN SODIUM AND SULBACTAM SODIUM SCH MLS/HR: 2; 1 INJECTION, POWDER, FOR SOLUTION INTRAMUSCULAR; INTRAVENOUS at 02:11

## 2019-02-20 RX ADMIN — FERROUS SULFATE TAB 325 MG (65 MG ELEMENTAL FE) SCH MG: 325 (65 FE) TAB at 09:02

## 2019-02-20 RX ADMIN — CARVEDILOL SCH MG: 3.12 TABLET, FILM COATED ORAL at 18:14

## 2019-02-20 RX ADMIN — AMPICILLIN SODIUM AND SULBACTAM SODIUM SCH MLS/HR: 2; 1 INJECTION, POWDER, FOR SOLUTION INTRAMUSCULAR; INTRAVENOUS at 13:59

## 2019-02-20 RX ADMIN — MORPHINE SULFATE PRN MG: 10 INJECTION INTRAVENOUS at 06:18

## 2019-02-20 RX ADMIN — BENZONATATE SCH MG: 100 CAPSULE ORAL at 07:46

## 2019-02-20 RX ADMIN — DAPTOMYCIN SCH MLS/HR: 500 INJECTION, POWDER, LYOPHILIZED, FOR SOLUTION INTRAVENOUS at 16:20

## 2019-02-20 RX ADMIN — MORPHINE SULFATE PRN MG: 10 INJECTION INTRAVENOUS at 09:01

## 2019-02-20 RX ADMIN — MORPHINE SULFATE PRN MG: 10 INJECTION INTRAVENOUS at 16:30

## 2019-02-20 RX ADMIN — ENOXAPARIN SODIUM SCH MG: 40 INJECTION SUBCUTANEOUS at 07:46

## 2019-02-20 RX ADMIN — MORPHINE SULFATE PRN MG: 10 INJECTION INTRAVENOUS at 14:00

## 2019-02-21 VITALS — SYSTOLIC BLOOD PRESSURE: 135 MMHG | DIASTOLIC BLOOD PRESSURE: 89 MMHG

## 2019-02-21 VITALS — DIASTOLIC BLOOD PRESSURE: 81 MMHG | SYSTOLIC BLOOD PRESSURE: 138 MMHG

## 2019-02-21 VITALS — DIASTOLIC BLOOD PRESSURE: 84 MMHG | SYSTOLIC BLOOD PRESSURE: 140 MMHG

## 2019-02-21 LAB
ERYTHROCYTE [SEDIMENTATION RATE] IN BLOOD BY WESTERGREN METHOD: 86 MM/HR (ref 0–10)
HCT (SEDRATE): 31.6 % (ref 39.2–51.8)

## 2019-02-21 RX ADMIN — DAPTOMYCIN SCH MLS/HR: 500 INJECTION, POWDER, LYOPHILIZED, FOR SOLUTION INTRAVENOUS at 16:25

## 2019-02-21 RX ADMIN — CARVEDILOL SCH MG: 3.12 TABLET, FILM COATED ORAL at 06:52

## 2019-02-21 RX ADMIN — ENOXAPARIN SODIUM SCH MG: 40 INJECTION SUBCUTANEOUS at 09:00

## 2019-02-21 RX ADMIN — ENOXAPARIN SODIUM SCH MG: 40 INJECTION SUBCUTANEOUS at 08:42

## 2019-02-21 RX ADMIN — CARVEDILOL SCH MG: 3.12 TABLET, FILM COATED ORAL at 17:26

## 2019-02-22 VITALS — SYSTOLIC BLOOD PRESSURE: 149 MMHG | DIASTOLIC BLOOD PRESSURE: 86 MMHG

## 2019-02-22 VITALS — SYSTOLIC BLOOD PRESSURE: 147 MMHG | DIASTOLIC BLOOD PRESSURE: 75 MMHG

## 2019-02-22 VITALS — DIASTOLIC BLOOD PRESSURE: 88 MMHG | SYSTOLIC BLOOD PRESSURE: 132 MMHG

## 2019-02-22 VITALS — DIASTOLIC BLOOD PRESSURE: 84 MMHG | SYSTOLIC BLOOD PRESSURE: 140 MMHG

## 2019-02-22 RX ADMIN — CARVEDILOL SCH MG: 3.12 TABLET, FILM COATED ORAL at 17:50

## 2019-02-22 RX ADMIN — ENOXAPARIN SODIUM SCH MG: 40 INJECTION SUBCUTANEOUS at 08:32

## 2019-02-22 RX ADMIN — CARVEDILOL SCH MG: 3.12 TABLET, FILM COATED ORAL at 05:16

## 2019-02-22 RX ADMIN — DAPTOMYCIN SCH MLS/HR: 500 INJECTION, POWDER, LYOPHILIZED, FOR SOLUTION INTRAVENOUS at 16:39

## 2019-02-22 RX ADMIN — FERROUS SULFATE TAB 325 MG (65 MG ELEMENTAL FE) SCH MG: 325 (65 FE) TAB at 08:33

## 2019-02-22 RX ADMIN — ACETAMINOPHEN PRN MG: 325 TABLET, FILM COATED ORAL at 08:33

## 2019-02-23 VITALS — SYSTOLIC BLOOD PRESSURE: 132 MMHG | DIASTOLIC BLOOD PRESSURE: 77 MMHG

## 2019-02-23 VITALS — DIASTOLIC BLOOD PRESSURE: 89 MMHG | SYSTOLIC BLOOD PRESSURE: 129 MMHG

## 2019-02-23 LAB — CREAT SERPL-MCNC: 0.94 MG/DL (ref 0.7–1.3)

## 2019-02-23 RX ADMIN — BENZONATATE PRN MG: 100 CAPSULE ORAL at 09:49

## 2019-02-23 RX ADMIN — ACETAMINOPHEN PRN MG: 325 TABLET, FILM COATED ORAL at 01:47

## 2019-02-23 RX ADMIN — ENOXAPARIN SODIUM SCH MG: 40 INJECTION SUBCUTANEOUS at 09:05

## 2019-02-23 RX ADMIN — BENZONATATE PRN MG: 100 CAPSULE ORAL at 01:48

## 2019-02-23 RX ADMIN — CARVEDILOL SCH MG: 3.12 TABLET, FILM COATED ORAL at 06:28

## 2019-02-23 RX ADMIN — ACETAMINOPHEN PRN MG: 325 TABLET, FILM COATED ORAL at 09:50

## 2019-03-15 ENCOUNTER — HOSPITAL ENCOUNTER (EMERGENCY)
Facility: MEDICAL CENTER | Age: 64
End: 2019-03-15
Attending: EMERGENCY MEDICINE
Payer: MEDICAID

## 2019-03-15 VITALS
WEIGHT: 160.94 LBS | DIASTOLIC BLOOD PRESSURE: 66 MMHG | RESPIRATION RATE: 19 BRPM | TEMPERATURE: 97.1 F | BODY MASS INDEX: 22.53 KG/M2 | SYSTOLIC BLOOD PRESSURE: 103 MMHG | OXYGEN SATURATION: 95 % | HEIGHT: 71 IN | HEART RATE: 61 BPM

## 2019-03-15 DIAGNOSIS — F10.920 ALCOHOLIC INTOXICATION WITHOUT COMPLICATION (HCC): ICD-10-CM

## 2019-03-15 PROCEDURE — 99284 EMERGENCY DEPT VISIT MOD MDM: CPT

## 2019-03-15 PROCEDURE — 36415 COLL VENOUS BLD VENIPUNCTURE: CPT

## 2019-03-15 RX ORDER — SULFAMETHOXAZOLE AND TRIMETHOPRIM 800; 160 MG/1; MG/1
1 TABLET ORAL ONCE
Status: DISCONTINUED | OUTPATIENT
Start: 2019-03-15 | End: 2019-03-15 | Stop reason: HOSPADM

## 2019-03-15 RX ORDER — SULFAMETHOXAZOLE AND TRIMETHOPRIM 800; 160 MG/1; MG/1
1 TABLET ORAL 2 TIMES DAILY
Qty: 20 TAB | Refills: 0 | Status: SHIPPED | OUTPATIENT
Start: 2019-03-15 | End: 2019-03-25

## 2019-03-15 NOTE — ED TRIAGE NOTES
Pt was brought into the ED by [facility]. Reports that he was found with AMS in front of the bar with unsteady gait. EMS reports ETOH. Also noted that patient has a Chronic wound on his LLE that is poorly cared for.

## 2019-03-15 NOTE — DISCHARGE INSTRUCTIONS
[facility].  We sent a prescription for the antibiotics you were supposed to be taking a while ago to [facility].

## 2019-03-15 NOTE — ED PROVIDER NOTES
"ED Provider Note    Scribed for Marcio Pinto M.D. by Marcio Pinto. 3/15/2019  1:45 AM    CHIEF COMPLAINT  Chief Complaint   Patient presents with   • Alcohol Intoxication     was picked up by EMS in front of a bar. ETOH and multiple GLFs today.   • Wound Check     Chronic wound on his LLE, homeless.       HPI  Bola Varela is a 63 y.o. homeless alcoholic male who presents to the Emergency Room brought in by [facility] with no chief complaint.  EMS reports that he was found in front of a bar with an unsteady gait and someone called EMS.  Incidentally, it is noted that he has a poorly cared for chronic wound on his left lower extremity.  When I asked how the patient is feeling he reports \"Ain't dead yet.\"  When I ask why he is in the emergency department he says \"You tell me.\"  He has no medical complaints.  He is awake and alert.  He is hostile, confrontational, and not interested in answering questions.    REVIEW OF SYSTEMS  See HPI for further details.    PAST MEDICAL HISTORY   has a past medical history of Psychiatric problem; Restless leg; and Tobacco use.    SOCIAL HISTORY  Social History     Social History Main Topics   • Smoking status: Current Every Day Smoker     Packs/day: 0.30     Years: 40.00     Types: Cigarettes   • Smokeless tobacco: Never Used      Comment: 1 ppd until few years ago   • Alcohol use No      Comment: previous heavy alcohol use, on and off   • Drug use: Yes     Types: Marijuana, Inhaled      Comment: marijuana daily    • Sexual activity: Not Currently     Partners: Female       SURGICAL HISTORY   has a past surgical history that includes ankle orif (Right).    CURRENT MEDICATIONS  Home Medications     Reviewed by Juan Alberto Kern R.N. (Registered Nurse) on 03/15/19 at 0141  Med List Status: <None>   Medication Last Dose Status        Patient Berhane Taking any Medications                       ALLERGIES  No Known Allergies    PHYSICAL EXAM  VITAL SIGNS: /66   " "Pulse 75   Temp 36.2 °C (97.1 °F) (Tympanic)   Resp 20   Ht 1.803 m (5' 11\")   Wt 73 kg (160 lb 15 oz)   BMI 22.45 kg/m²   Pulse ox interpretation: I interpret this pulse ox as normal.  Constitutional: Alert in no apparent distress.  HENT: Normocephalic, Atraumatic, Bilateral external ears normal. Nose normal.   Eyes: Conjunctiva normal, non-icteric.   Heart: Regular rate and rythm, no murmurs.    Lungs: Clear to auscultation bilaterally.  Skin: Poorly cared for chronic left lower extremity wound.  Neurologic: Alert, Grossly non-focal.   Psychiatric: Affect normal, Judgment normal, Mood normal, Appears appropriate and not intoxicated.     COURSE & MEDICAL DECISION MAKING  The patient's VS, Nurses notes reviewed. (See chart for details)    1:45 AM Patient seen and examined at bedside.  He was brought in for loitering outside of a bar.  He shows no evidence of altered mental status.  Incidentally, he has a poorly cared for chronic left lower extremity wound.  His records show clearly that he has in the recent past been followed by the [facility] and [facility] wound care.  This chronic wound is incidental to his presentation tonight.  I re-provided him the contact information for his primary care clinic in wound clinic.  His wound will be redressed.  However, he has no medical complaints, no evidence of emergency medical condition, and no indication for further workup.      2:00 AM The patient's chart shows that he was meant to be taking Bactrim in the recent past.  It is not clear that he ever filled or started that prescription.  Given his chronic wound and noncompliance, he will be given a first dose of Bactrim here and I will send to the Bact[facility].    2:13 AM Pt refused the offered Bactrim. He will be discharged.     The patient will return for new or worsening symptoms and is stable at the time of discharge.    The patient is referred to a primary physician for " blood pressure management, diabetic screening, and for all other preventative health concerns.      DISPOSITION:  Patient will be discharged home in stable condition.    FOLLOW UP:  [facility]  [address]  [facility] [address] [phone]  Schedule an appointment as soon as possible for a visit       [facility]  [address]  [facility] [id number] [phone]  Schedule an appointment as soon as possible for a visit         OUTPATIENT MEDICATIONS:  New Prescriptions    No medications on file         FINAL IMPRESSION  1. Alcoholic intoxication without complication (HCC)

## 2019-03-15 NOTE — ED NOTES
Pt ambulated on his own to lobby.  Remains intoxicated, but able to verbalize plan to get hotel room after finding money in his coat pocket.

## 2019-03-15 NOTE — ED NOTES
Break RN:  Pt's wound rewrapped and dressed. Pt dressed himself in room, given new socks.   D/C instructions provided to patient at bedside, verbalizes understanding and states plans for follow-up with wound care as recommended.   Scripts given  All belongings accounted for, all questions answered at this time.   Pt ambulated to [facility] with assistance from assist RN.

## 2019-03-17 ENCOUNTER — HOSPITAL ENCOUNTER (INPATIENT)
Dept: HOSPITAL 8 - ED | Age: 64
LOS: 2 days | Discharge: LEFT BEFORE DISCHARGE | DRG: 540 | End: 2019-03-19
Attending: FAMILY MEDICINE | Admitting: FAMILY MEDICINE
Payer: MEDICAID

## 2019-03-17 VITALS — SYSTOLIC BLOOD PRESSURE: 126 MMHG | DIASTOLIC BLOOD PRESSURE: 77 MMHG

## 2019-03-17 VITALS — BODY MASS INDEX: 20.19 KG/M2 | HEIGHT: 71 IN | WEIGHT: 144.18 LBS

## 2019-03-17 DIAGNOSIS — D64.9: ICD-10-CM

## 2019-03-17 DIAGNOSIS — L03.116: ICD-10-CM

## 2019-03-17 DIAGNOSIS — F10.10: ICD-10-CM

## 2019-03-17 DIAGNOSIS — Z53.21: ICD-10-CM

## 2019-03-17 DIAGNOSIS — K21.9: ICD-10-CM

## 2019-03-17 DIAGNOSIS — Z63.8: ICD-10-CM

## 2019-03-17 DIAGNOSIS — Z59.0: ICD-10-CM

## 2019-03-17 DIAGNOSIS — F17.200: ICD-10-CM
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
## 2019-03-17 DIAGNOSIS — Z91.19: ICD-10-CM

## 2019-03-17 DIAGNOSIS — Z86.14: ICD-10-CM

## 2019-03-17 DIAGNOSIS — M86.8X6: Primary | ICD-10-CM

## 2019-03-17 DIAGNOSIS — J44.9: ICD-10-CM

## 2019-03-17 LAB
ALBUMIN SERPL-MCNC: 2.9 G/DL (ref 3.4–5)
ANION GAP SERPL CALC-SCNC: 9 MMOL/L (ref 5–15)
BASOPHILS # BLD AUTO: 0.07 X10^3/UL (ref 0–0.1)
BASOPHILS NFR BLD AUTO: 1 % (ref 0–1)
CALCIUM SERPL-MCNC: 8.2 MG/DL (ref 8.5–10.1)
CHLORIDE SERPL-SCNC: 105 MMOL/L (ref 98–107)
CREAT SERPL-MCNC: 0.89 MG/DL (ref 0.7–1.3)
CRP SERPL-MCNC: 5 MG/DL (ref 0.02–0.49)
EOSINOPHIL # BLD AUTO: 0.08 X10^3/UL (ref 0–0.4)
EOSINOPHIL NFR BLD AUTO: 1 % (ref 1–7)
ERYTHROCYTE [DISTWIDTH] IN BLOOD BY AUTOMATED COUNT: 17.2 % (ref 9.4–14.8)
ERYTHROCYTE [SEDIMENTATION RATE] IN BLOOD BY WESTERGREN METHOD: 55 MM/HR (ref 0–10)
HCT (SEDRATE): 29.8 % (ref 39.2–51.8)
LYMPHOCYTES # BLD AUTO: 1.27 X10^3/UL (ref 1–3.4)
LYMPHOCYTES NFR BLD AUTO: 17 % (ref 22–44)
MCH RBC QN AUTO: 26.1 PG (ref 27.5–34.5)
MCHC RBC AUTO-ENTMCNC: 32.6 G/DL (ref 33.2–36.2)
MCV RBC AUTO: 79.8 FL (ref 81–97)
MD: NO
MONOCYTES # BLD AUTO: 1 X10^3/UL (ref 0.2–0.8)
MONOCYTES NFR BLD AUTO: 13 % (ref 2–9)
NEUTROPHILS # BLD AUTO: 5.13 X10^3/UL (ref 1.8–6.8)
NEUTROPHILS NFR BLD AUTO: 68 % (ref 42–75)
PLATELET # BLD AUTO: 399 X10^3/UL (ref 130–400)
PMV BLD AUTO: 7.4 FL (ref 7.4–10.4)
RBC # BLD AUTO: 3.79 X10^6/UL (ref 4.38–5.82)

## 2019-03-17 PROCEDURE — A9585 GADOBUTROL INJECTION: HCPCS

## 2019-03-17 PROCEDURE — 87040 BLOOD CULTURE FOR BACTERIA: CPT

## 2019-03-17 PROCEDURE — 36415 COLL VENOUS BLD VENIPUNCTURE: CPT

## 2019-03-17 PROCEDURE — 96375 TX/PRO/DX INJ NEW DRUG ADDON: CPT

## 2019-03-17 PROCEDURE — 96374 THER/PROPH/DIAG INJ IV PUSH: CPT

## 2019-03-17 PROCEDURE — 86140 C-REACTIVE PROTEIN: CPT

## 2019-03-17 PROCEDURE — 85651 RBC SED RATE NONAUTOMATED: CPT

## 2019-03-17 PROCEDURE — 85025 COMPLETE CBC W/AUTO DIFF WBC: CPT

## 2019-03-17 PROCEDURE — 96372 THER/PROPH/DIAG INJ SC/IM: CPT

## 2019-03-17 PROCEDURE — 83605 ASSAY OF LACTIC ACID: CPT

## 2019-03-17 PROCEDURE — 82040 ASSAY OF SERUM ALBUMIN: CPT

## 2019-03-17 PROCEDURE — 80048 BASIC METABOLIC PNL TOTAL CA: CPT

## 2019-03-17 PROCEDURE — 80053 COMPREHEN METABOLIC PANEL: CPT

## 2019-03-17 RX ADMIN — SODIUM CHLORIDE, PRESERVATIVE FREE SCH ML: 5 INJECTION INTRAVENOUS at 22:58

## 2019-03-17 RX ADMIN — TAZOBACTAM SODIUM AND PIPERACILLIN SODIUM SCH MLS/HR: 375; 3 INJECTION, SOLUTION INTRAVENOUS at 22:58

## 2019-03-17 RX ADMIN — Medication SCH TAB: at 23:13

## 2019-03-17 RX ADMIN — HEPARIN SODIUM SCH UNITS: 5000 INJECTION, SOLUTION INTRAVENOUS; SUBCUTANEOUS at 22:59

## 2019-03-17 SDOH — ECONOMIC STABILITY - HOUSING INSECURITY: HOMELESSNESS: Z59.0

## 2019-03-17 SDOH — SOCIAL STABILITY - SOCIAL INSECURITY: OTHER SPECIFIED PROBLEMS RELATED TO PRIMARY SUPPORT GROUP: Z63.8

## 2019-03-18 VITALS — DIASTOLIC BLOOD PRESSURE: 75 MMHG | SYSTOLIC BLOOD PRESSURE: 120 MMHG

## 2019-03-18 VITALS — SYSTOLIC BLOOD PRESSURE: 122 MMHG | DIASTOLIC BLOOD PRESSURE: 77 MMHG

## 2019-03-18 VITALS — DIASTOLIC BLOOD PRESSURE: 72 MMHG | SYSTOLIC BLOOD PRESSURE: 119 MMHG

## 2019-03-18 LAB
ALBUMIN SERPL-MCNC: 2.5 G/DL (ref 3.4–5)
ALP SERPL-CCNC: 128 U/L (ref 45–117)
ALT SERPL-CCNC: 18 U/L (ref 12–78)
ANION GAP SERPL CALC-SCNC: 4 MMOL/L (ref 5–15)
BASOPHILS # BLD AUTO: 0.06 X10^3/UL (ref 0–0.1)
BASOPHILS NFR BLD AUTO: 1 % (ref 0–1)
BILIRUB SERPL-MCNC: 0.3 MG/DL (ref 0.2–1)
CALCIUM SERPL-MCNC: 8.1 MG/DL (ref 8.5–10.1)
CHLORIDE SERPL-SCNC: 103 MMOL/L (ref 98–107)
CREAT SERPL-MCNC: 0.88 MG/DL (ref 0.7–1.3)
EOSINOPHIL # BLD AUTO: 0.15 X10^3/UL (ref 0–0.4)
EOSINOPHIL NFR BLD AUTO: 2 % (ref 1–7)
ERYTHROCYTE [DISTWIDTH] IN BLOOD BY AUTOMATED COUNT: 17.3 % (ref 9.4–14.8)
LYMPHOCYTES # BLD AUTO: 1.39 X10^3/UL (ref 1–3.4)
LYMPHOCYTES NFR BLD AUTO: 22 % (ref 22–44)
MCH RBC QN AUTO: 26.8 PG (ref 27.5–34.5)
MCHC RBC AUTO-ENTMCNC: 33.2 G/DL (ref 33.2–36.2)
MCV RBC AUTO: 80.9 FL (ref 81–97)
MD: NO
MONOCYTES # BLD AUTO: 1.02 X10^3/UL (ref 0.2–0.8)
MONOCYTES NFR BLD AUTO: 16 % (ref 2–9)
NEUTROPHILS # BLD AUTO: 3.62 X10^3/UL (ref 1.8–6.8)
NEUTROPHILS NFR BLD AUTO: 58 % (ref 42–75)
PLATELET # BLD AUTO: 350 X10^3/UL (ref 130–400)
PMV BLD AUTO: 7.8 FL (ref 7.4–10.4)
PROT SERPL-MCNC: 6.6 G/DL (ref 6.4–8.2)
RBC # BLD AUTO: 3.62 X10^6/UL (ref 4.38–5.82)

## 2019-03-18 RX ADMIN — SODIUM CHLORIDE, PRESERVATIVE FREE SCH ML: 5 INJECTION INTRAVENOUS at 20:13

## 2019-03-18 RX ADMIN — ACETAMINOPHEN PRN MG: 325 TABLET, FILM COATED ORAL at 10:36

## 2019-03-18 RX ADMIN — ACETAMINOPHEN PRN MG: 325 TABLET, FILM COATED ORAL at 20:13

## 2019-03-18 RX ADMIN — TAZOBACTAM SODIUM AND PIPERACILLIN SODIUM SCH MLS/HR: 375; 3 INJECTION, SOLUTION INTRAVENOUS at 11:58

## 2019-03-18 RX ADMIN — SODIUM CHLORIDE, PRESERVATIVE FREE SCH ML: 5 INJECTION INTRAVENOUS at 10:29

## 2019-03-18 RX ADMIN — Medication SCH TAB: at 20:13

## 2019-03-18 RX ADMIN — TAZOBACTAM SODIUM AND PIPERACILLIN SODIUM SCH MLS/HR: 375; 3 INJECTION, SOLUTION INTRAVENOUS at 17:54

## 2019-03-18 RX ADMIN — TAZOBACTAM SODIUM AND PIPERACILLIN SODIUM SCH MLS/HR: 375; 3 INJECTION, SOLUTION INTRAVENOUS at 22:41

## 2019-03-18 RX ADMIN — TAZOBACTAM SODIUM AND PIPERACILLIN SODIUM SCH MLS/HR: 375; 3 INJECTION, SOLUTION INTRAVENOUS at 05:25

## 2019-03-18 RX ADMIN — Medication SCH TAB: at 10:29

## 2019-03-18 RX ADMIN — HEPARIN SODIUM SCH UNITS: 5000 INJECTION, SOLUTION INTRAVENOUS; SUBCUTANEOUS at 06:47

## 2019-03-19 VITALS — DIASTOLIC BLOOD PRESSURE: 83 MMHG | SYSTOLIC BLOOD PRESSURE: 128 MMHG

## 2019-03-19 VITALS — SYSTOLIC BLOOD PRESSURE: 136 MMHG | DIASTOLIC BLOOD PRESSURE: 81 MMHG

## 2019-03-19 RX ADMIN — TAZOBACTAM SODIUM AND PIPERACILLIN SODIUM SCH MLS/HR: 375; 3 INJECTION, SOLUTION INTRAVENOUS at 04:53

## 2019-03-22 ENCOUNTER — HOSPITAL ENCOUNTER (INPATIENT)
Dept: HOSPITAL 8 - ED | Age: 64
LOS: 49 days | Discharge: LEFT BEFORE DISCHARGE | DRG: 540 | End: 2019-05-10
Attending: HOSPITALIST | Admitting: HOSPITALIST
Payer: MEDICAID

## 2019-03-22 VITALS — HEIGHT: 70 IN | WEIGHT: 157.85 LBS | BODY MASS INDEX: 22.6 KG/M2

## 2019-03-22 VITALS — SYSTOLIC BLOOD PRESSURE: 128 MMHG | DIASTOLIC BLOOD PRESSURE: 75 MMHG

## 2019-03-22 DIAGNOSIS — Z91.19: ICD-10-CM

## 2019-03-22 DIAGNOSIS — F17.200: ICD-10-CM

## 2019-03-22 DIAGNOSIS — M86.8X6: Primary | ICD-10-CM

## 2019-03-22 DIAGNOSIS — K21.9: ICD-10-CM

## 2019-03-22 DIAGNOSIS — Z53.21: ICD-10-CM

## 2019-03-22 DIAGNOSIS — L03.116: ICD-10-CM

## 2019-03-22 DIAGNOSIS — I10: ICD-10-CM

## 2019-03-22 DIAGNOSIS — M24.562: ICD-10-CM

## 2019-03-22 DIAGNOSIS — Z86.14: ICD-10-CM

## 2019-03-22 DIAGNOSIS — Z87.39: ICD-10-CM

## 2019-03-22 DIAGNOSIS — J44.9: ICD-10-CM

## 2019-03-22 DIAGNOSIS — Z91.14: ICD-10-CM

## 2019-03-22 DIAGNOSIS — Z59.0: ICD-10-CM

## 2019-03-22 DIAGNOSIS — F10.20: ICD-10-CM

## 2019-03-22 DIAGNOSIS — F41.9: ICD-10-CM

## 2019-03-22 DIAGNOSIS — M79.89: ICD-10-CM

## 2019-03-22 DIAGNOSIS — D50.9: ICD-10-CM

## 2019-03-22 DIAGNOSIS — R45.851: ICD-10-CM

## 2019-03-22 DIAGNOSIS — B95.62: ICD-10-CM

## 2019-03-22 LAB
ALBUMIN SERPL-MCNC: 3.5 G/DL (ref 3.4–5)
ALP SERPL-CCNC: 143 U/L (ref 45–117)
ALT SERPL-CCNC: 31 U/L (ref 12–78)
ANION GAP SERPL CALC-SCNC: 9 MMOL/L (ref 5–15)
BASOPHILS # BLD AUTO: 0.06 X10^3/UL (ref 0–0.1)
BASOPHILS NFR BLD AUTO: 1 % (ref 0–1)
BILIRUB SERPL-MCNC: 0.2 MG/DL (ref 0.2–1)
CALCIUM SERPL-MCNC: 8.5 MG/DL (ref 8.5–10.1)
CHLORIDE SERPL-SCNC: 106 MMOL/L (ref 98–107)
CREAT SERPL-MCNC: 0.82 MG/DL (ref 0.7–1.3)
CRP SERPL-MCNC: 1.4 MG/DL (ref 0.02–0.49)
EOSINOPHIL # BLD AUTO: 0.07 X10^3/UL (ref 0–0.4)
EOSINOPHIL NFR BLD AUTO: 1 % (ref 1–7)
ERYTHROCYTE [DISTWIDTH] IN BLOOD BY AUTOMATED COUNT: 17.6 % (ref 9.4–14.8)
ERYTHROCYTE [SEDIMENTATION RATE] IN BLOOD BY WESTERGREN METHOD: 78 MM/HR (ref 0–10)
HCT (SEDRATE): 33.8 % (ref 39.2–51.8)
LYMPHOCYTES # BLD AUTO: 1.99 X10^3/UL (ref 1–3.4)
LYMPHOCYTES NFR BLD AUTO: 27 % (ref 22–44)
MCH RBC QN AUTO: 26.1 PG (ref 27.5–34.5)
MCHC RBC AUTO-ENTMCNC: 32.3 G/DL (ref 33.2–36.2)
MCV RBC AUTO: 80.9 FL (ref 81–97)
MD: NO
MONOCYTES # BLD AUTO: 0.6 X10^3/UL (ref 0.2–0.8)
MONOCYTES NFR BLD AUTO: 8 % (ref 2–9)
NEUTROPHILS # BLD AUTO: 4.76 X10^3/UL (ref 1.8–6.8)
NEUTROPHILS NFR BLD AUTO: 64 % (ref 42–75)
PLATELET # BLD AUTO: 462 X10^3/UL (ref 130–400)
PMV BLD AUTO: 6.9 FL (ref 7.4–10.4)
PROT SERPL-MCNC: 8.5 G/DL (ref 6.4–8.2)
RBC # BLD AUTO: 4.23 X10^6/UL (ref 4.38–5.82)

## 2019-03-22 PROCEDURE — 80074 ACUTE HEPATITIS PANEL: CPT

## 2019-03-22 PROCEDURE — 87040 BLOOD CULTURE FOR BACTERIA: CPT

## 2019-03-22 PROCEDURE — 85025 COMPLETE CBC W/AUTO DIFF WBC: CPT

## 2019-03-22 PROCEDURE — 82565 ASSAY OF CREATININE: CPT

## 2019-03-22 PROCEDURE — C1751 CATH, INF, PER/CENT/MIDLINE: HCPCS

## 2019-03-22 PROCEDURE — 83550 IRON BINDING TEST: CPT

## 2019-03-22 PROCEDURE — 85651 RBC SED RATE NONAUTOMATED: CPT

## 2019-03-22 PROCEDURE — 76700 US EXAM ABDOM COMPLETE: CPT

## 2019-03-22 PROCEDURE — 87070 CULTURE OTHR SPECIMN AEROBIC: CPT

## 2019-03-22 PROCEDURE — 86140 C-REACTIVE PROTEIN: CPT

## 2019-03-22 PROCEDURE — 36415 COLL VENOUS BLD VENIPUNCTURE: CPT

## 2019-03-22 PROCEDURE — 80053 COMPREHEN METABOLIC PANEL: CPT

## 2019-03-22 PROCEDURE — 87205 SMEAR GRAM STAIN: CPT

## 2019-03-22 PROCEDURE — 87186 SC STD MICRODIL/AGAR DIL: CPT

## 2019-03-22 PROCEDURE — 80048 BASIC METABOLIC PNL TOTAL CA: CPT

## 2019-03-22 PROCEDURE — 87077 CULTURE AEROBIC IDENTIFY: CPT

## 2019-03-22 PROCEDURE — 83540 ASSAY OF IRON: CPT

## 2019-03-22 PROCEDURE — 80202 ASSAY OF VANCOMYCIN: CPT

## 2019-03-22 PROCEDURE — 36573 INSJ PICC RS&I 5 YR+: CPT

## 2019-03-22 PROCEDURE — 99285 EMERGENCY DEPT VISIT HI MDM: CPT

## 2019-03-22 RX ADMIN — NICOTINE SCH PATCH: 14 PATCH, EXTENDED RELEASE TRANSDERMAL at 21:30

## 2019-03-22 RX ADMIN — TAZOBACTAM SODIUM AND PIPERACILLIN SODIUM SCH MLS/HR: 375; 3 INJECTION, SOLUTION INTRAVENOUS at 23:26

## 2019-03-22 SDOH — ECONOMIC STABILITY - HOUSING INSECURITY: HOMELESSNESS: Z59.0

## 2019-03-23 VITALS — DIASTOLIC BLOOD PRESSURE: 68 MMHG | SYSTOLIC BLOOD PRESSURE: 142 MMHG

## 2019-03-23 VITALS — DIASTOLIC BLOOD PRESSURE: 91 MMHG | SYSTOLIC BLOOD PRESSURE: 150 MMHG

## 2019-03-23 VITALS — DIASTOLIC BLOOD PRESSURE: 67 MMHG | SYSTOLIC BLOOD PRESSURE: 136 MMHG

## 2019-03-23 VITALS — SYSTOLIC BLOOD PRESSURE: 132 MMHG | DIASTOLIC BLOOD PRESSURE: 69 MMHG

## 2019-03-23 LAB
ANION GAP SERPL CALC-SCNC: 4 MMOL/L (ref 5–15)
BASOPHILS # BLD AUTO: 0.03 X10^3/UL (ref 0–0.1)
BASOPHILS NFR BLD AUTO: 1 % (ref 0–1)
CALCIUM SERPL-MCNC: 8.1 MG/DL (ref 8.5–10.1)
CHLORIDE SERPL-SCNC: 111 MMOL/L (ref 98–107)
CREAT SERPL-MCNC: 0.79 MG/DL (ref 0.7–1.3)
EOSINOPHIL # BLD AUTO: 0.1 X10^3/UL (ref 0–0.4)
EOSINOPHIL NFR BLD AUTO: 2 % (ref 1–7)
ERYTHROCYTE [DISTWIDTH] IN BLOOD BY AUTOMATED COUNT: 17.7 % (ref 9.4–14.8)
LYMPHOCYTES # BLD AUTO: 1.73 X10^3/UL (ref 1–3.4)
LYMPHOCYTES NFR BLD AUTO: 28 % (ref 22–44)
MCH RBC QN AUTO: 26.5 PG (ref 27.5–34.5)
MCHC RBC AUTO-ENTMCNC: 32.7 G/DL (ref 33.2–36.2)
MCV RBC AUTO: 80.9 FL (ref 81–97)
MD: NO
MONOCYTES # BLD AUTO: 0.78 X10^3/UL (ref 0.2–0.8)
MONOCYTES NFR BLD AUTO: 13 % (ref 2–9)
NEUTROPHILS # BLD AUTO: 3.5 X10^3/UL (ref 1.8–6.8)
NEUTROPHILS NFR BLD AUTO: 57 % (ref 42–75)
PLATELET # BLD AUTO: 387 X10^3/UL (ref 130–400)
PMV BLD AUTO: 7.1 FL (ref 7.4–10.4)
RBC # BLD AUTO: 3.49 X10^6/UL (ref 4.38–5.82)

## 2019-03-23 RX ADMIN — NICOTINE SCH PATCH: 14 PATCH, EXTENDED RELEASE TRANSDERMAL at 20:36

## 2019-03-23 RX ADMIN — DAPTOMYCIN SCH MLS/HR: 500 INJECTION, POWDER, LYOPHILIZED, FOR SOLUTION INTRAVENOUS at 13:23

## 2019-03-23 RX ADMIN — TAZOBACTAM SODIUM AND PIPERACILLIN SODIUM SCH MLS/HR: 375; 3 INJECTION, SOLUTION INTRAVENOUS at 11:39

## 2019-03-23 RX ADMIN — TAZOBACTAM SODIUM AND PIPERACILLIN SODIUM SCH MLS/HR: 375; 3 INJECTION, SOLUTION INTRAVENOUS at 05:04

## 2019-03-24 VITALS — DIASTOLIC BLOOD PRESSURE: 88 MMHG | SYSTOLIC BLOOD PRESSURE: 152 MMHG

## 2019-03-24 VITALS — SYSTOLIC BLOOD PRESSURE: 138 MMHG | DIASTOLIC BLOOD PRESSURE: 74 MMHG

## 2019-03-24 VITALS — DIASTOLIC BLOOD PRESSURE: 88 MMHG | SYSTOLIC BLOOD PRESSURE: 147 MMHG

## 2019-03-24 VITALS — DIASTOLIC BLOOD PRESSURE: 64 MMHG | SYSTOLIC BLOOD PRESSURE: 157 MMHG

## 2019-03-24 RX ADMIN — DAPTOMYCIN SCH MLS/HR: 500 INJECTION, POWDER, LYOPHILIZED, FOR SOLUTION INTRAVENOUS at 13:23

## 2019-03-24 RX ADMIN — NICOTINE SCH PATCH: 14 PATCH, EXTENDED RELEASE TRANSDERMAL at 20:54

## 2019-03-25 VITALS — SYSTOLIC BLOOD PRESSURE: 139 MMHG | DIASTOLIC BLOOD PRESSURE: 81 MMHG

## 2019-03-25 VITALS — DIASTOLIC BLOOD PRESSURE: 87 MMHG | SYSTOLIC BLOOD PRESSURE: 139 MMHG

## 2019-03-25 VITALS — SYSTOLIC BLOOD PRESSURE: 141 MMHG | DIASTOLIC BLOOD PRESSURE: 84 MMHG

## 2019-03-25 RX ADMIN — NICOTINE SCH PATCH: 14 PATCH, EXTENDED RELEASE TRANSDERMAL at 20:27

## 2019-03-25 RX ADMIN — DAPTOMYCIN SCH MLS/HR: 500 INJECTION, POWDER, LYOPHILIZED, FOR SOLUTION INTRAVENOUS at 13:17

## 2019-03-25 RX ADMIN — AMPICILLIN SODIUM AND SULBACTAM SODIUM SCH MLS/HR: 2; 1 INJECTION, POWDER, FOR SOLUTION INTRAMUSCULAR; INTRAVENOUS at 19:59

## 2019-03-25 RX ADMIN — CARVEDILOL SCH MG: 3.12 TABLET, FILM COATED ORAL at 19:59

## 2019-03-25 RX ADMIN — ENOXAPARIN SODIUM SCH MG: 40 INJECTION SUBCUTANEOUS at 10:13

## 2019-03-26 VITALS — DIASTOLIC BLOOD PRESSURE: 94 MMHG | SYSTOLIC BLOOD PRESSURE: 140 MMHG

## 2019-03-26 VITALS — SYSTOLIC BLOOD PRESSURE: 159 MMHG | DIASTOLIC BLOOD PRESSURE: 85 MMHG

## 2019-03-26 VITALS — DIASTOLIC BLOOD PRESSURE: 72 MMHG | SYSTOLIC BLOOD PRESSURE: 138 MMHG

## 2019-03-26 VITALS — SYSTOLIC BLOOD PRESSURE: 139 MMHG | DIASTOLIC BLOOD PRESSURE: 85 MMHG

## 2019-03-26 LAB
% IRON SATURATION: 5 % (ref 20–55)
IRON LEVEL: 16 MCG/DL (ref 65–175)
TIBC SERPL-MCNC: 301 MCG/DL (ref 250–450)

## 2019-03-26 RX ADMIN — CARVEDILOL SCH MG: 3.12 TABLET, FILM COATED ORAL at 18:51

## 2019-03-26 RX ADMIN — FERROUS SULFATE TAB 325 MG (65 MG ELEMENTAL FE) SCH MG: 325 (65 FE) TAB at 10:27

## 2019-03-26 RX ADMIN — CARVEDILOL SCH MG: 3.12 TABLET, FILM COATED ORAL at 05:23

## 2019-03-26 RX ADMIN — VANCOMYCIN HYDROCHLORIDE SCH MLS/HR: 1 INJECTION, SOLUTION INTRAVENOUS at 21:15

## 2019-03-26 RX ADMIN — ENOXAPARIN SODIUM SCH MG: 40 INJECTION SUBCUTANEOUS at 10:26

## 2019-03-26 RX ADMIN — VANCOMYCIN HYDROCHLORIDE SCH MLS/HR: 1 INJECTION, SOLUTION INTRAVENOUS at 10:30

## 2019-03-26 RX ADMIN — AMPICILLIN SODIUM AND SULBACTAM SODIUM SCH MLS/HR: 2; 1 INJECTION, POWDER, FOR SOLUTION INTRAMUSCULAR; INTRAVENOUS at 20:22

## 2019-03-26 RX ADMIN — AMPICILLIN SODIUM AND SULBACTAM SODIUM SCH MLS/HR: 2; 1 INJECTION, POWDER, FOR SOLUTION INTRAMUSCULAR; INTRAVENOUS at 08:00

## 2019-03-26 RX ADMIN — AMPICILLIN SODIUM AND SULBACTAM SODIUM SCH MLS/HR: 2; 1 INJECTION, POWDER, FOR SOLUTION INTRAMUSCULAR; INTRAVENOUS at 14:09

## 2019-03-26 RX ADMIN — AMPICILLIN SODIUM AND SULBACTAM SODIUM SCH MLS/HR: 2; 1 INJECTION, POWDER, FOR SOLUTION INTRAMUSCULAR; INTRAVENOUS at 02:07

## 2019-03-26 RX ADMIN — NICOTINE SCH PATCH: 14 PATCH, EXTENDED RELEASE TRANSDERMAL at 21:17

## 2019-03-27 VITALS — SYSTOLIC BLOOD PRESSURE: 150 MMHG | DIASTOLIC BLOOD PRESSURE: 86 MMHG

## 2019-03-27 VITALS — DIASTOLIC BLOOD PRESSURE: 68 MMHG | SYSTOLIC BLOOD PRESSURE: 132 MMHG

## 2019-03-27 VITALS — DIASTOLIC BLOOD PRESSURE: 93 MMHG | SYSTOLIC BLOOD PRESSURE: 145 MMHG

## 2019-03-27 VITALS — DIASTOLIC BLOOD PRESSURE: 86 MMHG | SYSTOLIC BLOOD PRESSURE: 137 MMHG

## 2019-03-27 VITALS — SYSTOLIC BLOOD PRESSURE: 135 MMHG | DIASTOLIC BLOOD PRESSURE: 82 MMHG

## 2019-03-27 LAB
BASOPHILS # BLD AUTO: 0.06 X10^3/UL (ref 0–0.1)
BASOPHILS NFR BLD AUTO: 1 % (ref 0–1)
EOSINOPHIL # BLD AUTO: 0.38 X10^3/UL (ref 0–0.4)
EOSINOPHIL NFR BLD AUTO: 6 % (ref 1–7)
ERYTHROCYTE [DISTWIDTH] IN BLOOD BY AUTOMATED COUNT: 17.8 % (ref 9.4–14.8)
LYMPHOCYTES # BLD AUTO: 1.13 X10^3/UL (ref 1–3.4)
LYMPHOCYTES NFR BLD AUTO: 18 % (ref 22–44)
MCH RBC QN AUTO: 25.9 PG (ref 27.5–34.5)
MCHC RBC AUTO-ENTMCNC: 31.9 G/DL (ref 33.2–36.2)
MCV RBC AUTO: 81.2 FL (ref 81–97)
MD: NO
MONOCYTES # BLD AUTO: 0.84 X10^3/UL (ref 0.2–0.8)
MONOCYTES NFR BLD AUTO: 13 % (ref 2–9)
NEUTROPHILS # BLD AUTO: 4.05 X10^3/UL (ref 1.8–6.8)
NEUTROPHILS NFR BLD AUTO: 63 % (ref 42–75)
PLATELET # BLD AUTO: 444 X10^3/UL (ref 130–400)
PMV BLD AUTO: 7.2 FL (ref 7.4–10.4)
RBC # BLD AUTO: 3.8 X10^6/UL (ref 4.38–5.82)

## 2019-03-27 RX ADMIN — AMPICILLIN SODIUM AND SULBACTAM SODIUM SCH MLS/HR: 2; 1 INJECTION, POWDER, FOR SOLUTION INTRAMUSCULAR; INTRAVENOUS at 14:40

## 2019-03-27 RX ADMIN — AMPICILLIN SODIUM AND SULBACTAM SODIUM SCH MLS/HR: 2; 1 INJECTION, POWDER, FOR SOLUTION INTRAMUSCULAR; INTRAVENOUS at 07:57

## 2019-03-27 RX ADMIN — AMPICILLIN SODIUM AND SULBACTAM SODIUM SCH MLS/HR: 2; 1 INJECTION, POWDER, FOR SOLUTION INTRAMUSCULAR; INTRAVENOUS at 20:51

## 2019-03-27 RX ADMIN — CARVEDILOL SCH MG: 3.12 TABLET, FILM COATED ORAL at 16:50

## 2019-03-27 RX ADMIN — VANCOMYCIN HYDROCHLORIDE SCH MLS/HR: 1 INJECTION, SOLUTION INTRAVENOUS at 09:34

## 2019-03-27 RX ADMIN — VANCOMYCIN HYDROCHLORIDE SCH MLS/HR: 1 INJECTION, SOLUTION INTRAVENOUS at 22:06

## 2019-03-27 RX ADMIN — AMPICILLIN SODIUM AND SULBACTAM SODIUM SCH MLS/HR: 2; 1 INJECTION, POWDER, FOR SOLUTION INTRAMUSCULAR; INTRAVENOUS at 02:17

## 2019-03-27 RX ADMIN — CARVEDILOL SCH MG: 3.12 TABLET, FILM COATED ORAL at 05:32

## 2019-03-27 RX ADMIN — NICOTINE SCH PATCH: 14 PATCH, EXTENDED RELEASE TRANSDERMAL at 22:06

## 2019-03-27 RX ADMIN — ENOXAPARIN SODIUM SCH MG: 40 INJECTION SUBCUTANEOUS at 09:34

## 2019-03-28 VITALS — SYSTOLIC BLOOD PRESSURE: 137 MMHG | DIASTOLIC BLOOD PRESSURE: 70 MMHG

## 2019-03-28 VITALS — SYSTOLIC BLOOD PRESSURE: 129 MMHG | DIASTOLIC BLOOD PRESSURE: 76 MMHG

## 2019-03-28 VITALS — SYSTOLIC BLOOD PRESSURE: 158 MMHG | DIASTOLIC BLOOD PRESSURE: 88 MMHG

## 2019-03-28 VITALS — DIASTOLIC BLOOD PRESSURE: 89 MMHG | SYSTOLIC BLOOD PRESSURE: 120 MMHG

## 2019-03-28 VITALS — DIASTOLIC BLOOD PRESSURE: 89 MMHG | SYSTOLIC BLOOD PRESSURE: 126 MMHG

## 2019-03-28 LAB — CREAT SERPL-MCNC: 1.03 MG/DL (ref 0.7–1.3)

## 2019-03-28 RX ADMIN — VANCOMYCIN HYDROCHLORIDE SCH MLS/HR: 1 INJECTION, SOLUTION INTRAVENOUS at 09:37

## 2019-03-28 RX ADMIN — AMPICILLIN SODIUM AND SULBACTAM SODIUM SCH MLS/HR: 2; 1 INJECTION, POWDER, FOR SOLUTION INTRAMUSCULAR; INTRAVENOUS at 07:47

## 2019-03-28 RX ADMIN — VANCOMYCIN HYDROCHLORIDE SCH MLS/HR: 1 INJECTION, SOLUTION INTRAVENOUS at 21:42

## 2019-03-28 RX ADMIN — AMPICILLIN SODIUM AND SULBACTAM SODIUM SCH MLS/HR: 2; 1 INJECTION, POWDER, FOR SOLUTION INTRAMUSCULAR; INTRAVENOUS at 14:05

## 2019-03-28 RX ADMIN — ENOXAPARIN SODIUM SCH MG: 40 INJECTION SUBCUTANEOUS at 09:37

## 2019-03-28 RX ADMIN — FERROUS SULFATE TAB 325 MG (65 MG ELEMENTAL FE) SCH MG: 325 (65 FE) TAB at 09:37

## 2019-03-28 RX ADMIN — CARVEDILOL SCH MG: 3.12 TABLET, FILM COATED ORAL at 05:39

## 2019-03-28 RX ADMIN — AMPICILLIN SODIUM AND SULBACTAM SODIUM SCH MLS/HR: 2; 1 INJECTION, POWDER, FOR SOLUTION INTRAMUSCULAR; INTRAVENOUS at 20:13

## 2019-03-28 RX ADMIN — AMPICILLIN SODIUM AND SULBACTAM SODIUM SCH MLS/HR: 2; 1 INJECTION, POWDER, FOR SOLUTION INTRAMUSCULAR; INTRAVENOUS at 01:47

## 2019-03-28 RX ADMIN — CARVEDILOL SCH MG: 3.12 TABLET, FILM COATED ORAL at 17:18

## 2019-03-28 RX ADMIN — NICOTINE SCH PATCH: 14 PATCH, EXTENDED RELEASE TRANSDERMAL at 21:42

## 2019-03-29 VITALS — SYSTOLIC BLOOD PRESSURE: 131 MMHG | DIASTOLIC BLOOD PRESSURE: 84 MMHG

## 2019-03-29 VITALS — SYSTOLIC BLOOD PRESSURE: 119 MMHG | DIASTOLIC BLOOD PRESSURE: 79 MMHG

## 2019-03-29 VITALS — SYSTOLIC BLOOD PRESSURE: 144 MMHG | DIASTOLIC BLOOD PRESSURE: 85 MMHG

## 2019-03-29 VITALS — SYSTOLIC BLOOD PRESSURE: 119 MMHG | DIASTOLIC BLOOD PRESSURE: 75 MMHG

## 2019-03-29 VITALS — SYSTOLIC BLOOD PRESSURE: 127 MMHG | DIASTOLIC BLOOD PRESSURE: 74 MMHG

## 2019-03-29 RX ADMIN — AMPICILLIN SODIUM AND SULBACTAM SODIUM SCH MLS/HR: 2; 1 INJECTION, POWDER, FOR SOLUTION INTRAMUSCULAR; INTRAVENOUS at 08:00

## 2019-03-29 RX ADMIN — AMPICILLIN SODIUM AND SULBACTAM SODIUM SCH MLS/HR: 2; 1 INJECTION, POWDER, FOR SOLUTION INTRAMUSCULAR; INTRAVENOUS at 20:43

## 2019-03-29 RX ADMIN — CARVEDILOL SCH MG: 3.12 TABLET, FILM COATED ORAL at 18:35

## 2019-03-29 RX ADMIN — AMPICILLIN SODIUM AND SULBACTAM SODIUM SCH MLS/HR: 2; 1 INJECTION, POWDER, FOR SOLUTION INTRAMUSCULAR; INTRAVENOUS at 01:59

## 2019-03-29 RX ADMIN — AMPICILLIN SODIUM AND SULBACTAM SODIUM SCH MLS/HR: 2; 1 INJECTION, POWDER, FOR SOLUTION INTRAMUSCULAR; INTRAVENOUS at 14:46

## 2019-03-29 RX ADMIN — ENOXAPARIN SODIUM SCH MG: 40 INJECTION SUBCUTANEOUS at 09:18

## 2019-03-29 RX ADMIN — VANCOMYCIN HYDROCHLORIDE SCH MLS/HR: 1 INJECTION, SOLUTION INTRAVENOUS at 11:12

## 2019-03-29 RX ADMIN — VANCOMYCIN HYDROCHLORIDE SCH MLS/HR: 1 INJECTION, SOLUTION INTRAVENOUS at 21:33

## 2019-03-29 RX ADMIN — CARVEDILOL SCH MG: 3.12 TABLET, FILM COATED ORAL at 06:31

## 2019-03-29 RX ADMIN — NICOTINE SCH PATCH: 14 PATCH, EXTENDED RELEASE TRANSDERMAL at 21:30

## 2019-03-30 VITALS — SYSTOLIC BLOOD PRESSURE: 141 MMHG | DIASTOLIC BLOOD PRESSURE: 82 MMHG

## 2019-03-30 VITALS — SYSTOLIC BLOOD PRESSURE: 117 MMHG | DIASTOLIC BLOOD PRESSURE: 81 MMHG

## 2019-03-30 VITALS — SYSTOLIC BLOOD PRESSURE: 140 MMHG | DIASTOLIC BLOOD PRESSURE: 74 MMHG

## 2019-03-30 VITALS — DIASTOLIC BLOOD PRESSURE: 77 MMHG | SYSTOLIC BLOOD PRESSURE: 146 MMHG

## 2019-03-30 RX ADMIN — FERROUS SULFATE TAB 325 MG (65 MG ELEMENTAL FE) SCH MG: 325 (65 FE) TAB at 10:48

## 2019-03-30 RX ADMIN — VANCOMYCIN HYDROCHLORIDE SCH MLS/HR: 1 INJECTION, SOLUTION INTRAVENOUS at 12:25

## 2019-03-30 RX ADMIN — NICOTINE SCH PATCH: 14 PATCH, EXTENDED RELEASE TRANSDERMAL at 21:30

## 2019-03-30 RX ADMIN — ENOXAPARIN SODIUM SCH MG: 40 INJECTION SUBCUTANEOUS at 10:48

## 2019-03-30 RX ADMIN — AMPICILLIN SODIUM AND SULBACTAM SODIUM SCH MLS/HR: 2; 1 INJECTION, POWDER, FOR SOLUTION INTRAMUSCULAR; INTRAVENOUS at 10:48

## 2019-03-30 RX ADMIN — AMPICILLIN SODIUM AND SULBACTAM SODIUM SCH MLS/HR: 2; 1 INJECTION, POWDER, FOR SOLUTION INTRAMUSCULAR; INTRAVENOUS at 02:48

## 2019-03-30 RX ADMIN — AMPICILLIN SODIUM AND SULBACTAM SODIUM SCH MLS/HR: 2; 1 INJECTION, POWDER, FOR SOLUTION INTRAMUSCULAR; INTRAVENOUS at 18:50

## 2019-03-30 RX ADMIN — VANCOMYCIN HYDROCHLORIDE SCH MLS/HR: 1 INJECTION, SOLUTION INTRAVENOUS at 22:52

## 2019-03-30 RX ADMIN — CARVEDILOL SCH MG: 3.12 TABLET, FILM COATED ORAL at 06:40

## 2019-03-30 RX ADMIN — CARVEDILOL SCH MG: 3.12 TABLET, FILM COATED ORAL at 18:50

## 2019-03-31 VITALS — SYSTOLIC BLOOD PRESSURE: 113 MMHG | DIASTOLIC BLOOD PRESSURE: 76 MMHG

## 2019-03-31 VITALS — SYSTOLIC BLOOD PRESSURE: 124 MMHG | DIASTOLIC BLOOD PRESSURE: 65 MMHG

## 2019-03-31 VITALS — SYSTOLIC BLOOD PRESSURE: 138 MMHG | DIASTOLIC BLOOD PRESSURE: 79 MMHG

## 2019-03-31 VITALS — DIASTOLIC BLOOD PRESSURE: 73 MMHG | SYSTOLIC BLOOD PRESSURE: 112 MMHG

## 2019-03-31 RX ADMIN — VANCOMYCIN HYDROCHLORIDE SCH MLS/HR: 1 INJECTION, SOLUTION INTRAVENOUS at 11:55

## 2019-03-31 RX ADMIN — AMPICILLIN SODIUM AND SULBACTAM SODIUM SCH MLS/HR: 2; 1 INJECTION, POWDER, FOR SOLUTION INTRAMUSCULAR; INTRAVENOUS at 05:36

## 2019-03-31 RX ADMIN — AMPICILLIN SODIUM AND SULBACTAM SODIUM SCH MLS/HR: 2; 1 INJECTION, POWDER, FOR SOLUTION INTRAMUSCULAR; INTRAVENOUS at 18:30

## 2019-03-31 RX ADMIN — AMPICILLIN SODIUM AND SULBACTAM SODIUM SCH MLS/HR: 2; 1 INJECTION, POWDER, FOR SOLUTION INTRAMUSCULAR; INTRAVENOUS at 12:00

## 2019-03-31 RX ADMIN — AMPICILLIN SODIUM AND SULBACTAM SODIUM SCH MLS/HR: 2; 1 INJECTION, POWDER, FOR SOLUTION INTRAMUSCULAR; INTRAVENOUS at 00:06

## 2019-03-31 RX ADMIN — CARVEDILOL SCH MG: 3.12 TABLET, FILM COATED ORAL at 18:30

## 2019-03-31 RX ADMIN — VANCOMYCIN HYDROCHLORIDE SCH MLS/HR: 1 INJECTION, SOLUTION INTRAVENOUS at 23:12

## 2019-03-31 RX ADMIN — NICOTINE SCH PATCH: 14 PATCH, EXTENDED RELEASE TRANSDERMAL at 20:57

## 2019-03-31 RX ADMIN — ENOXAPARIN SODIUM SCH MG: 40 INJECTION SUBCUTANEOUS at 08:42

## 2019-03-31 RX ADMIN — CARVEDILOL SCH MG: 3.12 TABLET, FILM COATED ORAL at 05:36

## 2019-04-01 VITALS — DIASTOLIC BLOOD PRESSURE: 74 MMHG | SYSTOLIC BLOOD PRESSURE: 134 MMHG

## 2019-04-01 VITALS — DIASTOLIC BLOOD PRESSURE: 80 MMHG | SYSTOLIC BLOOD PRESSURE: 127 MMHG

## 2019-04-01 VITALS — SYSTOLIC BLOOD PRESSURE: 132 MMHG | DIASTOLIC BLOOD PRESSURE: 79 MMHG

## 2019-04-01 VITALS — SYSTOLIC BLOOD PRESSURE: 125 MMHG | DIASTOLIC BLOOD PRESSURE: 81 MMHG

## 2019-04-01 RX ADMIN — ENOXAPARIN SODIUM SCH MG: 40 INJECTION SUBCUTANEOUS at 08:48

## 2019-04-01 RX ADMIN — FERROUS SULFATE TAB 325 MG (65 MG ELEMENTAL FE) SCH MG: 325 (65 FE) TAB at 08:47

## 2019-04-01 RX ADMIN — DIPHENHYDRAMINE HYDROCHLORIDE PRN MG: 25 CAPSULE ORAL at 23:10

## 2019-04-01 RX ADMIN — VANCOMYCIN HYDROCHLORIDE SCH MLS/HR: 1 INJECTION, SOLUTION INTRAVENOUS at 10:57

## 2019-04-01 RX ADMIN — CARVEDILOL SCH MG: 3.12 TABLET, FILM COATED ORAL at 05:51

## 2019-04-01 RX ADMIN — CARVEDILOL SCH MG: 3.12 TABLET, FILM COATED ORAL at 18:02

## 2019-04-01 RX ADMIN — VANCOMYCIN HYDROCHLORIDE SCH MLS/HR: 1 INJECTION, SOLUTION INTRAVENOUS at 23:11

## 2019-04-01 RX ADMIN — DIPHENHYDRAMINE HYDROCHLORIDE PRN MG: 25 CAPSULE ORAL at 00:46

## 2019-04-01 RX ADMIN — AMPICILLIN SODIUM AND SULBACTAM SODIUM SCH MLS/HR: 2; 1 INJECTION, POWDER, FOR SOLUTION INTRAMUSCULAR; INTRAVENOUS at 05:51

## 2019-04-01 RX ADMIN — AMPICILLIN SODIUM AND SULBACTAM SODIUM SCH MLS/HR: 2; 1 INJECTION, POWDER, FOR SOLUTION INTRAMUSCULAR; INTRAVENOUS at 12:06

## 2019-04-01 RX ADMIN — AMPICILLIN SODIUM AND SULBACTAM SODIUM SCH MLS/HR: 2; 1 INJECTION, POWDER, FOR SOLUTION INTRAMUSCULAR; INTRAVENOUS at 18:02

## 2019-04-01 RX ADMIN — NICOTINE SCH PATCH: 14 PATCH, EXTENDED RELEASE TRANSDERMAL at 19:09

## 2019-04-01 RX ADMIN — AMPICILLIN SODIUM AND SULBACTAM SODIUM SCH MLS/HR: 2; 1 INJECTION, POWDER, FOR SOLUTION INTRAMUSCULAR; INTRAVENOUS at 00:22

## 2019-04-02 VITALS — DIASTOLIC BLOOD PRESSURE: 73 MMHG | SYSTOLIC BLOOD PRESSURE: 126 MMHG

## 2019-04-02 VITALS — SYSTOLIC BLOOD PRESSURE: 149 MMHG | DIASTOLIC BLOOD PRESSURE: 87 MMHG

## 2019-04-02 VITALS — DIASTOLIC BLOOD PRESSURE: 71 MMHG | SYSTOLIC BLOOD PRESSURE: 132 MMHG

## 2019-04-02 VITALS — SYSTOLIC BLOOD PRESSURE: 149 MMHG | DIASTOLIC BLOOD PRESSURE: 89 MMHG

## 2019-04-02 VITALS — DIASTOLIC BLOOD PRESSURE: 79 MMHG | SYSTOLIC BLOOD PRESSURE: 125 MMHG

## 2019-04-02 RX ADMIN — AMPICILLIN SODIUM AND SULBACTAM SODIUM SCH MLS/HR: 2; 1 INJECTION, POWDER, FOR SOLUTION INTRAMUSCULAR; INTRAVENOUS at 00:16

## 2019-04-02 RX ADMIN — AMPICILLIN SODIUM AND SULBACTAM SODIUM SCH MLS/HR: 2; 1 INJECTION, POWDER, FOR SOLUTION INTRAMUSCULAR; INTRAVENOUS at 06:02

## 2019-04-02 RX ADMIN — VANCOMYCIN HYDROCHLORIDE SCH MLS/HR: 1 INJECTION, SOLUTION INTRAVENOUS at 11:52

## 2019-04-02 RX ADMIN — NICOTINE SCH PATCH: 14 PATCH, EXTENDED RELEASE TRANSDERMAL at 21:09

## 2019-04-02 RX ADMIN — NICOTINE SCH PATCH: 14 PATCH, EXTENDED RELEASE TRANSDERMAL at 21:08

## 2019-04-02 RX ADMIN — CARVEDILOL SCH MG: 3.12 TABLET, FILM COATED ORAL at 17:42

## 2019-04-02 RX ADMIN — ENOXAPARIN SODIUM SCH MG: 40 INJECTION SUBCUTANEOUS at 08:52

## 2019-04-02 RX ADMIN — CARVEDILOL SCH MG: 3.12 TABLET, FILM COATED ORAL at 06:02

## 2019-04-02 RX ADMIN — AMPICILLIN SODIUM AND SULBACTAM SODIUM SCH MLS/HR: 2; 1 INJECTION, POWDER, FOR SOLUTION INTRAMUSCULAR; INTRAVENOUS at 13:14

## 2019-04-02 RX ADMIN — AMPICILLIN SODIUM AND SULBACTAM SODIUM SCH MLS/HR: 2; 1 INJECTION, POWDER, FOR SOLUTION INTRAMUSCULAR; INTRAVENOUS at 19:27

## 2019-04-03 VITALS — SYSTOLIC BLOOD PRESSURE: 117 MMHG | DIASTOLIC BLOOD PRESSURE: 85 MMHG

## 2019-04-03 VITALS — SYSTOLIC BLOOD PRESSURE: 124 MMHG | DIASTOLIC BLOOD PRESSURE: 68 MMHG

## 2019-04-03 VITALS — DIASTOLIC BLOOD PRESSURE: 76 MMHG | SYSTOLIC BLOOD PRESSURE: 128 MMHG

## 2019-04-03 VITALS — DIASTOLIC BLOOD PRESSURE: 85 MMHG | SYSTOLIC BLOOD PRESSURE: 132 MMHG

## 2019-04-03 VITALS — SYSTOLIC BLOOD PRESSURE: 119 MMHG | DIASTOLIC BLOOD PRESSURE: 75 MMHG

## 2019-04-03 RX ADMIN — AMPICILLIN SODIUM AND SULBACTAM SODIUM SCH MLS/HR: 2; 1 INJECTION, POWDER, FOR SOLUTION INTRAMUSCULAR; INTRAVENOUS at 13:55

## 2019-04-03 RX ADMIN — AMPICILLIN SODIUM AND SULBACTAM SODIUM SCH MLS/HR: 2; 1 INJECTION, POWDER, FOR SOLUTION INTRAMUSCULAR; INTRAVENOUS at 07:44

## 2019-04-03 RX ADMIN — ENOXAPARIN SODIUM SCH MG: 40 INJECTION SUBCUTANEOUS at 09:16

## 2019-04-03 RX ADMIN — AMPICILLIN SODIUM AND SULBACTAM SODIUM SCH MLS/HR: 2; 1 INJECTION, POWDER, FOR SOLUTION INTRAMUSCULAR; INTRAVENOUS at 19:40

## 2019-04-03 RX ADMIN — AMPICILLIN SODIUM AND SULBACTAM SODIUM SCH MLS/HR: 2; 1 INJECTION, POWDER, FOR SOLUTION INTRAMUSCULAR; INTRAVENOUS at 01:21

## 2019-04-03 RX ADMIN — VANCOMYCIN HYDROCHLORIDE SCH MLS/HR: 1 INJECTION, POWDER, LYOPHILIZED, FOR SOLUTION INTRAVENOUS at 15:37

## 2019-04-03 RX ADMIN — FERROUS SULFATE TAB 325 MG (65 MG ELEMENTAL FE) SCH MG: 325 (65 FE) TAB at 09:17

## 2019-04-03 RX ADMIN — CARVEDILOL SCH MG: 3.12 TABLET, FILM COATED ORAL at 17:47

## 2019-04-03 RX ADMIN — NICOTINE SCH PATCH: 14 PATCH, EXTENDED RELEASE TRANSDERMAL at 21:08

## 2019-04-03 RX ADMIN — CARVEDILOL SCH MG: 3.12 TABLET, FILM COATED ORAL at 06:15

## 2019-04-04 VITALS — SYSTOLIC BLOOD PRESSURE: 116 MMHG | DIASTOLIC BLOOD PRESSURE: 72 MMHG

## 2019-04-04 VITALS — SYSTOLIC BLOOD PRESSURE: 122 MMHG | DIASTOLIC BLOOD PRESSURE: 79 MMHG

## 2019-04-04 VITALS — DIASTOLIC BLOOD PRESSURE: 80 MMHG | SYSTOLIC BLOOD PRESSURE: 132 MMHG

## 2019-04-04 VITALS — DIASTOLIC BLOOD PRESSURE: 73 MMHG | SYSTOLIC BLOOD PRESSURE: 115 MMHG

## 2019-04-04 RX ADMIN — AMPICILLIN SODIUM AND SULBACTAM SODIUM SCH MLS/HR: 2; 1 INJECTION, POWDER, FOR SOLUTION INTRAMUSCULAR; INTRAVENOUS at 00:40

## 2019-04-04 RX ADMIN — VANCOMYCIN HYDROCHLORIDE SCH MLS/HR: 1 INJECTION, POWDER, LYOPHILIZED, FOR SOLUTION INTRAVENOUS at 10:28

## 2019-04-04 RX ADMIN — AMPICILLIN SODIUM AND SULBACTAM SODIUM SCH MLS/HR: 2; 1 INJECTION, POWDER, FOR SOLUTION INTRAMUSCULAR; INTRAVENOUS at 19:36

## 2019-04-04 RX ADMIN — AMPICILLIN SODIUM AND SULBACTAM SODIUM SCH MLS/HR: 2; 1 INJECTION, POWDER, FOR SOLUTION INTRAMUSCULAR; INTRAVENOUS at 13:06

## 2019-04-04 RX ADMIN — DIPHENHYDRAMINE HYDROCHLORIDE PRN MG: 25 CAPSULE ORAL at 00:40

## 2019-04-04 RX ADMIN — ENOXAPARIN SODIUM SCH MG: 40 INJECTION SUBCUTANEOUS at 08:41

## 2019-04-04 RX ADMIN — CARVEDILOL SCH MG: 3.12 TABLET, FILM COATED ORAL at 05:13

## 2019-04-04 RX ADMIN — AMPICILLIN SODIUM AND SULBACTAM SODIUM SCH MLS/HR: 2; 1 INJECTION, POWDER, FOR SOLUTION INTRAMUSCULAR; INTRAVENOUS at 07:24

## 2019-04-04 RX ADMIN — NICOTINE SCH PATCH: 14 PATCH, EXTENDED RELEASE TRANSDERMAL at 21:30

## 2019-04-04 RX ADMIN — CARVEDILOL SCH MG: 3.12 TABLET, FILM COATED ORAL at 17:18

## 2019-04-05 VITALS — DIASTOLIC BLOOD PRESSURE: 80 MMHG | SYSTOLIC BLOOD PRESSURE: 130 MMHG

## 2019-04-05 VITALS — DIASTOLIC BLOOD PRESSURE: 79 MMHG | SYSTOLIC BLOOD PRESSURE: 121 MMHG

## 2019-04-05 VITALS — SYSTOLIC BLOOD PRESSURE: 132 MMHG | DIASTOLIC BLOOD PRESSURE: 77 MMHG

## 2019-04-05 VITALS — DIASTOLIC BLOOD PRESSURE: 74 MMHG | SYSTOLIC BLOOD PRESSURE: 127 MMHG

## 2019-04-05 VITALS — DIASTOLIC BLOOD PRESSURE: 77 MMHG | SYSTOLIC BLOOD PRESSURE: 121 MMHG

## 2019-04-05 RX ADMIN — AMPICILLIN SODIUM AND SULBACTAM SODIUM SCH MLS/HR: 2; 1 INJECTION, POWDER, FOR SOLUTION INTRAMUSCULAR; INTRAVENOUS at 02:01

## 2019-04-05 RX ADMIN — AMPICILLIN SODIUM AND SULBACTAM SODIUM SCH MLS/HR: 2; 1 INJECTION, POWDER, FOR SOLUTION INTRAMUSCULAR; INTRAVENOUS at 13:11

## 2019-04-05 RX ADMIN — AMPICILLIN SODIUM AND SULBACTAM SODIUM SCH MLS/HR: 2; 1 INJECTION, POWDER, FOR SOLUTION INTRAMUSCULAR; INTRAVENOUS at 07:44

## 2019-04-05 RX ADMIN — ENOXAPARIN SODIUM SCH MG: 40 INJECTION SUBCUTANEOUS at 09:03

## 2019-04-05 RX ADMIN — AMPICILLIN SODIUM AND SULBACTAM SODIUM SCH MLS/HR: 2; 1 INJECTION, POWDER, FOR SOLUTION INTRAMUSCULAR; INTRAVENOUS at 19:30

## 2019-04-05 RX ADMIN — NICOTINE SCH PATCH: 14 PATCH, EXTENDED RELEASE TRANSDERMAL at 21:30

## 2019-04-05 RX ADMIN — CARVEDILOL SCH MG: 3.12 TABLET, FILM COATED ORAL at 17:15

## 2019-04-05 RX ADMIN — FERROUS SULFATE TAB 325 MG (65 MG ELEMENTAL FE) SCH MG: 325 (65 FE) TAB at 09:03

## 2019-04-05 RX ADMIN — VANCOMYCIN HYDROCHLORIDE SCH MLS/HR: 1 INJECTION, POWDER, LYOPHILIZED, FOR SOLUTION INTRAVENOUS at 22:04

## 2019-04-05 RX ADMIN — VANCOMYCIN HYDROCHLORIDE SCH MLS/HR: 1 INJECTION, POWDER, LYOPHILIZED, FOR SOLUTION INTRAVENOUS at 03:53

## 2019-04-05 RX ADMIN — CARVEDILOL SCH MG: 3.12 TABLET, FILM COATED ORAL at 06:31

## 2019-04-06 VITALS — SYSTOLIC BLOOD PRESSURE: 134 MMHG | DIASTOLIC BLOOD PRESSURE: 79 MMHG

## 2019-04-06 VITALS — SYSTOLIC BLOOD PRESSURE: 129 MMHG | DIASTOLIC BLOOD PRESSURE: 71 MMHG

## 2019-04-06 VITALS — DIASTOLIC BLOOD PRESSURE: 74 MMHG | SYSTOLIC BLOOD PRESSURE: 142 MMHG

## 2019-04-06 LAB — CREAT SERPL-MCNC: 0.85 MG/DL (ref 0.7–1.3)

## 2019-04-06 RX ADMIN — ENOXAPARIN SODIUM SCH MG: 40 INJECTION SUBCUTANEOUS at 09:31

## 2019-04-06 RX ADMIN — AMPICILLIN SODIUM AND SULBACTAM SODIUM SCH MLS/HR: 2; 1 INJECTION, POWDER, FOR SOLUTION INTRAMUSCULAR; INTRAVENOUS at 19:41

## 2019-04-06 RX ADMIN — AMPICILLIN SODIUM AND SULBACTAM SODIUM SCH MLS/HR: 2; 1 INJECTION, POWDER, FOR SOLUTION INTRAMUSCULAR; INTRAVENOUS at 13:34

## 2019-04-06 RX ADMIN — VANCOMYCIN HYDROCHLORIDE SCH MLS/HR: 1 INJECTION, POWDER, LYOPHILIZED, FOR SOLUTION INTRAVENOUS at 17:06

## 2019-04-06 RX ADMIN — CARVEDILOL SCH MG: 3.12 TABLET, FILM COATED ORAL at 17:28

## 2019-04-06 RX ADMIN — NICOTINE SCH PATCH: 14 PATCH, EXTENDED RELEASE TRANSDERMAL at 21:30

## 2019-04-06 RX ADMIN — CARVEDILOL SCH MG: 3.12 TABLET, FILM COATED ORAL at 06:11

## 2019-04-06 RX ADMIN — AMPICILLIN SODIUM AND SULBACTAM SODIUM SCH MLS/HR: 2; 1 INJECTION, POWDER, FOR SOLUTION INTRAMUSCULAR; INTRAVENOUS at 07:23

## 2019-04-06 RX ADMIN — AMPICILLIN SODIUM AND SULBACTAM SODIUM SCH MLS/HR: 2; 1 INJECTION, POWDER, FOR SOLUTION INTRAMUSCULAR; INTRAVENOUS at 01:31

## 2019-04-07 VITALS — DIASTOLIC BLOOD PRESSURE: 73 MMHG | SYSTOLIC BLOOD PRESSURE: 121 MMHG

## 2019-04-07 VITALS — SYSTOLIC BLOOD PRESSURE: 128 MMHG | DIASTOLIC BLOOD PRESSURE: 76 MMHG

## 2019-04-07 VITALS — SYSTOLIC BLOOD PRESSURE: 126 MMHG | DIASTOLIC BLOOD PRESSURE: 66 MMHG

## 2019-04-07 VITALS — SYSTOLIC BLOOD PRESSURE: 127 MMHG | DIASTOLIC BLOOD PRESSURE: 76 MMHG

## 2019-04-07 LAB
ALBUMIN SERPL-MCNC: 3 G/DL (ref 3.4–5)
ALP SERPL-CCNC: 115 U/L (ref 45–117)
ALT SERPL-CCNC: 51 U/L (ref 12–78)
ANION GAP SERPL CALC-SCNC: 6 MMOL/L (ref 5–15)
BASOPHILS # BLD AUTO: 0.08 X10^3/UL (ref 0–0.1)
BASOPHILS NFR BLD AUTO: 1 % (ref 0–1)
BILIRUB SERPL-MCNC: 0.3 MG/DL (ref 0.2–1)
CALCIUM SERPL-MCNC: 8.3 MG/DL (ref 8.5–10.1)
CHLORIDE SERPL-SCNC: 106 MMOL/L (ref 98–107)
CREAT SERPL-MCNC: 1.04 MG/DL (ref 0.7–1.3)
CRP SERPL-MCNC: 0.56 MG/DL (ref 0.02–0.49)
EOSINOPHIL # BLD AUTO: 0.16 X10^3/UL (ref 0–0.4)
EOSINOPHIL NFR BLD AUTO: 2 % (ref 1–7)
ERYTHROCYTE [DISTWIDTH] IN BLOOD BY AUTOMATED COUNT: 18.1 % (ref 9.4–14.8)
ERYTHROCYTE [SEDIMENTATION RATE] IN BLOOD BY WESTERGREN METHOD: 48 MM/HR (ref 0–10)
HCT (SEDRATE): 32.4 % (ref 39.2–51.8)
LYMPHOCYTES # BLD AUTO: 1.47 X10^3/UL (ref 1–3.4)
LYMPHOCYTES NFR BLD AUTO: 22 % (ref 22–44)
MCH RBC QN AUTO: 25.5 PG (ref 27.5–34.5)
MCHC RBC AUTO-ENTMCNC: 31.5 G/DL (ref 33.2–36.2)
MCV RBC AUTO: 80.9 FL (ref 81–97)
MD: NO
MONOCYTES # BLD AUTO: 0.65 X10^3/UL (ref 0.2–0.8)
MONOCYTES NFR BLD AUTO: 10 % (ref 2–9)
NEUTROPHILS # BLD AUTO: 4.44 X10^3/UL (ref 1.8–6.8)
NEUTROPHILS NFR BLD AUTO: 65 % (ref 42–75)
PLATELET # BLD AUTO: 613 X10^3/UL (ref 130–400)
PMV BLD AUTO: 7 FL (ref 7.4–10.4)
PROT SERPL-MCNC: 7.2 G/DL (ref 6.4–8.2)
RBC # BLD AUTO: 3.98 X10^6/UL (ref 4.38–5.82)

## 2019-04-07 RX ADMIN — CARVEDILOL SCH MG: 3.12 TABLET, FILM COATED ORAL at 17:34

## 2019-04-07 RX ADMIN — ENOXAPARIN SODIUM SCH MG: 40 INJECTION SUBCUTANEOUS at 08:25

## 2019-04-07 RX ADMIN — FERROUS SULFATE TAB 325 MG (65 MG ELEMENTAL FE) SCH MG: 325 (65 FE) TAB at 09:31

## 2019-04-07 RX ADMIN — AMPICILLIN SODIUM AND SULBACTAM SODIUM SCH MLS/HR: 2; 1 INJECTION, POWDER, FOR SOLUTION INTRAMUSCULAR; INTRAVENOUS at 20:00

## 2019-04-07 RX ADMIN — AMPICILLIN SODIUM AND SULBACTAM SODIUM SCH MLS/HR: 2; 1 INJECTION, POWDER, FOR SOLUTION INTRAMUSCULAR; INTRAVENOUS at 02:07

## 2019-04-07 RX ADMIN — NICOTINE SCH PATCH: 14 PATCH, EXTENDED RELEASE TRANSDERMAL at 21:13

## 2019-04-07 RX ADMIN — VANCOMYCIN HYDROCHLORIDE SCH MLS/HR: 1 INJECTION, POWDER, LYOPHILIZED, FOR SOLUTION INTRAVENOUS at 09:31

## 2019-04-07 RX ADMIN — CARVEDILOL SCH MG: 3.12 TABLET, FILM COATED ORAL at 06:19

## 2019-04-07 RX ADMIN — AMPICILLIN SODIUM AND SULBACTAM SODIUM SCH MLS/HR: 2; 1 INJECTION, POWDER, FOR SOLUTION INTRAMUSCULAR; INTRAVENOUS at 08:25

## 2019-04-07 RX ADMIN — AMPICILLIN SODIUM AND SULBACTAM SODIUM SCH MLS/HR: 2; 1 INJECTION, POWDER, FOR SOLUTION INTRAMUSCULAR; INTRAVENOUS at 13:26

## 2019-04-08 VITALS — SYSTOLIC BLOOD PRESSURE: 131 MMHG | DIASTOLIC BLOOD PRESSURE: 82 MMHG

## 2019-04-08 VITALS — SYSTOLIC BLOOD PRESSURE: 146 MMHG | DIASTOLIC BLOOD PRESSURE: 88 MMHG

## 2019-04-08 VITALS — SYSTOLIC BLOOD PRESSURE: 129 MMHG | DIASTOLIC BLOOD PRESSURE: 76 MMHG

## 2019-04-08 VITALS — SYSTOLIC BLOOD PRESSURE: 122 MMHG | DIASTOLIC BLOOD PRESSURE: 71 MMHG

## 2019-04-08 RX ADMIN — VANCOMYCIN HYDROCHLORIDE SCH MLS/HR: 1 INJECTION, POWDER, LYOPHILIZED, FOR SOLUTION INTRAVENOUS at 04:23

## 2019-04-08 RX ADMIN — ENOXAPARIN SODIUM SCH MG: 40 INJECTION SUBCUTANEOUS at 08:08

## 2019-04-08 RX ADMIN — AMPICILLIN SODIUM AND SULBACTAM SODIUM SCH MLS/HR: 2; 1 INJECTION, POWDER, FOR SOLUTION INTRAMUSCULAR; INTRAVENOUS at 13:10

## 2019-04-08 RX ADMIN — AMPICILLIN SODIUM AND SULBACTAM SODIUM SCH MLS/HR: 2; 1 INJECTION, POWDER, FOR SOLUTION INTRAMUSCULAR; INTRAVENOUS at 02:06

## 2019-04-08 RX ADMIN — CARVEDILOL SCH MG: 3.12 TABLET, FILM COATED ORAL at 06:50

## 2019-04-08 RX ADMIN — AMPICILLIN SODIUM AND SULBACTAM SODIUM SCH MLS/HR: 2; 1 INJECTION, POWDER, FOR SOLUTION INTRAMUSCULAR; INTRAVENOUS at 08:07

## 2019-04-08 RX ADMIN — NICOTINE SCH PATCH: 14 PATCH, EXTENDED RELEASE TRANSDERMAL at 21:30

## 2019-04-08 RX ADMIN — CARVEDILOL SCH MG: 3.12 TABLET, FILM COATED ORAL at 16:25

## 2019-04-08 RX ADMIN — AMPICILLIN SODIUM AND SULBACTAM SODIUM SCH MLS/HR: 2; 1 INJECTION, POWDER, FOR SOLUTION INTRAMUSCULAR; INTRAVENOUS at 19:57

## 2019-04-08 RX ADMIN — VANCOMYCIN HYDROCHLORIDE SCH MLS/HR: 1 INJECTION, POWDER, LYOPHILIZED, FOR SOLUTION INTRAVENOUS at 22:17

## 2019-04-09 VITALS — DIASTOLIC BLOOD PRESSURE: 76 MMHG | SYSTOLIC BLOOD PRESSURE: 126 MMHG

## 2019-04-09 VITALS — DIASTOLIC BLOOD PRESSURE: 87 MMHG | SYSTOLIC BLOOD PRESSURE: 135 MMHG

## 2019-04-09 VITALS — SYSTOLIC BLOOD PRESSURE: 145 MMHG | DIASTOLIC BLOOD PRESSURE: 81 MMHG

## 2019-04-09 VITALS — DIASTOLIC BLOOD PRESSURE: 73 MMHG | SYSTOLIC BLOOD PRESSURE: 114 MMHG

## 2019-04-09 VITALS — DIASTOLIC BLOOD PRESSURE: 78 MMHG | SYSTOLIC BLOOD PRESSURE: 131 MMHG

## 2019-04-09 LAB — CREAT SERPL-MCNC: 0.96 MG/DL (ref 0.7–1.3)

## 2019-04-09 RX ADMIN — VANCOMYCIN HYDROCHLORIDE SCH MLS/HR: 1 INJECTION, POWDER, LYOPHILIZED, FOR SOLUTION INTRAVENOUS at 16:10

## 2019-04-09 RX ADMIN — AMPICILLIN SODIUM AND SULBACTAM SODIUM SCH MLS/HR: 2; 1 INJECTION, POWDER, FOR SOLUTION INTRAMUSCULAR; INTRAVENOUS at 21:11

## 2019-04-09 RX ADMIN — ENOXAPARIN SODIUM SCH MG: 40 INJECTION SUBCUTANEOUS at 08:37

## 2019-04-09 RX ADMIN — CARVEDILOL SCH MG: 3.12 TABLET, FILM COATED ORAL at 05:32

## 2019-04-09 RX ADMIN — AMPICILLIN SODIUM AND SULBACTAM SODIUM SCH MLS/HR: 2; 1 INJECTION, POWDER, FOR SOLUTION INTRAMUSCULAR; INTRAVENOUS at 14:10

## 2019-04-09 RX ADMIN — AMPICILLIN SODIUM AND SULBACTAM SODIUM SCH MLS/HR: 2; 1 INJECTION, POWDER, FOR SOLUTION INTRAMUSCULAR; INTRAVENOUS at 08:37

## 2019-04-09 RX ADMIN — FERROUS SULFATE TAB 325 MG (65 MG ELEMENTAL FE) SCH MG: 325 (65 FE) TAB at 08:37

## 2019-04-09 RX ADMIN — CARVEDILOL SCH MG: 3.12 TABLET, FILM COATED ORAL at 17:55

## 2019-04-09 RX ADMIN — NICOTINE SCH PATCH: 14 PATCH, EXTENDED RELEASE TRANSDERMAL at 21:30

## 2019-04-09 RX ADMIN — AMPICILLIN SODIUM AND SULBACTAM SODIUM SCH MLS/HR: 2; 1 INJECTION, POWDER, FOR SOLUTION INTRAMUSCULAR; INTRAVENOUS at 02:33

## 2019-04-09 RX ADMIN — ONDANSETRON PRN MG: 4 TABLET, ORALLY DISINTEGRATING ORAL at 13:37

## 2019-04-10 VITALS — SYSTOLIC BLOOD PRESSURE: 147 MMHG | DIASTOLIC BLOOD PRESSURE: 78 MMHG

## 2019-04-10 VITALS — DIASTOLIC BLOOD PRESSURE: 89 MMHG | SYSTOLIC BLOOD PRESSURE: 150 MMHG

## 2019-04-10 VITALS — DIASTOLIC BLOOD PRESSURE: 73 MMHG | SYSTOLIC BLOOD PRESSURE: 133 MMHG

## 2019-04-10 VITALS — DIASTOLIC BLOOD PRESSURE: 75 MMHG | SYSTOLIC BLOOD PRESSURE: 139 MMHG

## 2019-04-10 RX ADMIN — CARVEDILOL SCH MG: 3.12 TABLET, FILM COATED ORAL at 17:35

## 2019-04-10 RX ADMIN — VANCOMYCIN HYDROCHLORIDE SCH MLS/HR: 1 INJECTION, POWDER, LYOPHILIZED, FOR SOLUTION INTRAVENOUS at 10:04

## 2019-04-10 RX ADMIN — ENOXAPARIN SODIUM SCH MG: 40 INJECTION SUBCUTANEOUS at 08:34

## 2019-04-10 RX ADMIN — CARVEDILOL SCH MG: 3.12 TABLET, FILM COATED ORAL at 06:46

## 2019-04-10 RX ADMIN — NICOTINE SCH PATCH: 14 PATCH, EXTENDED RELEASE TRANSDERMAL at 20:55

## 2019-04-10 RX ADMIN — AMPICILLIN SODIUM AND SULBACTAM SODIUM SCH MLS/HR: 2; 1 INJECTION, POWDER, FOR SOLUTION INTRAMUSCULAR; INTRAVENOUS at 03:14

## 2019-04-10 RX ADMIN — AMPICILLIN SODIUM AND SULBACTAM SODIUM SCH MLS/HR: 2; 1 INJECTION, POWDER, FOR SOLUTION INTRAMUSCULAR; INTRAVENOUS at 08:34

## 2019-04-10 RX ADMIN — AMPICILLIN SODIUM AND SULBACTAM SODIUM SCH MLS/HR: 2; 1 INJECTION, POWDER, FOR SOLUTION INTRAMUSCULAR; INTRAVENOUS at 20:54

## 2019-04-10 RX ADMIN — AMPICILLIN SODIUM AND SULBACTAM SODIUM SCH MLS/HR: 2; 1 INJECTION, POWDER, FOR SOLUTION INTRAMUSCULAR; INTRAVENOUS at 14:48

## 2019-04-11 VITALS — SYSTOLIC BLOOD PRESSURE: 132 MMHG | DIASTOLIC BLOOD PRESSURE: 79 MMHG

## 2019-04-11 VITALS — SYSTOLIC BLOOD PRESSURE: 150 MMHG | DIASTOLIC BLOOD PRESSURE: 85 MMHG

## 2019-04-11 VITALS — SYSTOLIC BLOOD PRESSURE: 110 MMHG | DIASTOLIC BLOOD PRESSURE: 62 MMHG

## 2019-04-11 VITALS — SYSTOLIC BLOOD PRESSURE: 127 MMHG | DIASTOLIC BLOOD PRESSURE: 75 MMHG

## 2019-04-11 VITALS — DIASTOLIC BLOOD PRESSURE: 81 MMHG | SYSTOLIC BLOOD PRESSURE: 131 MMHG

## 2019-04-11 VITALS — SYSTOLIC BLOOD PRESSURE: 154 MMHG | DIASTOLIC BLOOD PRESSURE: 88 MMHG

## 2019-04-11 RX ADMIN — ENOXAPARIN SODIUM SCH MG: 40 INJECTION SUBCUTANEOUS at 08:31

## 2019-04-11 RX ADMIN — CARVEDILOL SCH MG: 3.12 TABLET, FILM COATED ORAL at 17:46

## 2019-04-11 RX ADMIN — AMPICILLIN SODIUM AND SULBACTAM SODIUM SCH MLS/HR: 2; 1 INJECTION, POWDER, FOR SOLUTION INTRAMUSCULAR; INTRAVENOUS at 03:06

## 2019-04-11 RX ADMIN — VANCOMYCIN HYDROCHLORIDE SCH MLS/HR: 1 INJECTION, POWDER, LYOPHILIZED, FOR SOLUTION INTRAVENOUS at 22:28

## 2019-04-11 RX ADMIN — CARVEDILOL SCH MG: 3.12 TABLET, FILM COATED ORAL at 04:53

## 2019-04-11 RX ADMIN — AMPICILLIN SODIUM AND SULBACTAM SODIUM SCH MLS/HR: 2; 1 INJECTION, POWDER, FOR SOLUTION INTRAMUSCULAR; INTRAVENOUS at 21:36

## 2019-04-11 RX ADMIN — VANCOMYCIN HYDROCHLORIDE SCH MLS/HR: 1 INJECTION, POWDER, LYOPHILIZED, FOR SOLUTION INTRAVENOUS at 04:32

## 2019-04-11 RX ADMIN — AMPICILLIN SODIUM AND SULBACTAM SODIUM SCH MLS/HR: 2; 1 INJECTION, POWDER, FOR SOLUTION INTRAMUSCULAR; INTRAVENOUS at 14:32

## 2019-04-11 RX ADMIN — AMPICILLIN SODIUM AND SULBACTAM SODIUM SCH MLS/HR: 2; 1 INJECTION, POWDER, FOR SOLUTION INTRAMUSCULAR; INTRAVENOUS at 08:31

## 2019-04-11 RX ADMIN — FERROUS SULFATE TAB 325 MG (65 MG ELEMENTAL FE) SCH MG: 325 (65 FE) TAB at 09:17

## 2019-04-11 RX ADMIN — NICOTINE SCH PATCH: 14 PATCH, EXTENDED RELEASE TRANSDERMAL at 21:07

## 2019-04-12 VITALS — DIASTOLIC BLOOD PRESSURE: 81 MMHG | SYSTOLIC BLOOD PRESSURE: 119 MMHG

## 2019-04-12 VITALS — DIASTOLIC BLOOD PRESSURE: 67 MMHG | SYSTOLIC BLOOD PRESSURE: 110 MMHG

## 2019-04-12 VITALS — SYSTOLIC BLOOD PRESSURE: 133 MMHG | DIASTOLIC BLOOD PRESSURE: 84 MMHG

## 2019-04-12 VITALS — DIASTOLIC BLOOD PRESSURE: 69 MMHG | SYSTOLIC BLOOD PRESSURE: 149 MMHG

## 2019-04-12 LAB — CREAT SERPL-MCNC: 0.88 MG/DL (ref 0.7–1.3)

## 2019-04-12 RX ADMIN — CARVEDILOL SCH MG: 3.12 TABLET, FILM COATED ORAL at 17:22

## 2019-04-12 RX ADMIN — NICOTINE SCH PATCH: 14 PATCH, EXTENDED RELEASE TRANSDERMAL at 20:56

## 2019-04-12 RX ADMIN — AMPICILLIN SODIUM AND SULBACTAM SODIUM SCH MLS/HR: 2; 1 INJECTION, POWDER, FOR SOLUTION INTRAMUSCULAR; INTRAVENOUS at 21:05

## 2019-04-12 RX ADMIN — AMPICILLIN SODIUM AND SULBACTAM SODIUM SCH MLS/HR: 2; 1 INJECTION, POWDER, FOR SOLUTION INTRAMUSCULAR; INTRAVENOUS at 08:28

## 2019-04-12 RX ADMIN — AMPICILLIN SODIUM AND SULBACTAM SODIUM SCH MLS/HR: 2; 1 INJECTION, POWDER, FOR SOLUTION INTRAMUSCULAR; INTRAVENOUS at 14:10

## 2019-04-12 RX ADMIN — VANCOMYCIN HYDROCHLORIDE SCH MLS/HR: 1 INJECTION, POWDER, LYOPHILIZED, FOR SOLUTION INTRAVENOUS at 17:22

## 2019-04-12 RX ADMIN — CARVEDILOL SCH MG: 3.12 TABLET, FILM COATED ORAL at 05:50

## 2019-04-12 RX ADMIN — ENOXAPARIN SODIUM SCH MG: 40 INJECTION SUBCUTANEOUS at 08:29

## 2019-04-12 RX ADMIN — AMPICILLIN SODIUM AND SULBACTAM SODIUM SCH MLS/HR: 2; 1 INJECTION, POWDER, FOR SOLUTION INTRAMUSCULAR; INTRAVENOUS at 02:23

## 2019-04-13 VITALS — SYSTOLIC BLOOD PRESSURE: 135 MMHG | DIASTOLIC BLOOD PRESSURE: 83 MMHG

## 2019-04-13 VITALS — SYSTOLIC BLOOD PRESSURE: 134 MMHG | DIASTOLIC BLOOD PRESSURE: 77 MMHG

## 2019-04-13 VITALS — SYSTOLIC BLOOD PRESSURE: 137 MMHG | DIASTOLIC BLOOD PRESSURE: 84 MMHG

## 2019-04-13 VITALS — SYSTOLIC BLOOD PRESSURE: 127 MMHG | DIASTOLIC BLOOD PRESSURE: 80 MMHG

## 2019-04-13 RX ADMIN — AMPICILLIN SODIUM AND SULBACTAM SODIUM SCH MLS/HR: 2; 1 INJECTION, POWDER, FOR SOLUTION INTRAMUSCULAR; INTRAVENOUS at 20:31

## 2019-04-13 RX ADMIN — VANCOMYCIN HYDROCHLORIDE SCH MLS/HR: 1 INJECTION, POWDER, LYOPHILIZED, FOR SOLUTION INTRAVENOUS at 09:30

## 2019-04-13 RX ADMIN — AMPICILLIN SODIUM AND SULBACTAM SODIUM SCH MLS/HR: 2; 1 INJECTION, POWDER, FOR SOLUTION INTRAMUSCULAR; INTRAVENOUS at 13:58

## 2019-04-13 RX ADMIN — AMPICILLIN SODIUM AND SULBACTAM SODIUM SCH MLS/HR: 2; 1 INJECTION, POWDER, FOR SOLUTION INTRAMUSCULAR; INTRAVENOUS at 02:21

## 2019-04-13 RX ADMIN — NICOTINE SCH PATCH: 14 PATCH, EXTENDED RELEASE TRANSDERMAL at 20:31

## 2019-04-13 RX ADMIN — CARVEDILOL SCH MG: 3.12 TABLET, FILM COATED ORAL at 17:01

## 2019-04-13 RX ADMIN — FERROUS SULFATE TAB 325 MG (65 MG ELEMENTAL FE) SCH MG: 325 (65 FE) TAB at 08:59

## 2019-04-13 RX ADMIN — AMPICILLIN SODIUM AND SULBACTAM SODIUM SCH MLS/HR: 2; 1 INJECTION, POWDER, FOR SOLUTION INTRAMUSCULAR; INTRAVENOUS at 07:56

## 2019-04-13 RX ADMIN — CARVEDILOL SCH MG: 3.12 TABLET, FILM COATED ORAL at 05:41

## 2019-04-13 RX ADMIN — ENOXAPARIN SODIUM SCH MG: 40 INJECTION SUBCUTANEOUS at 07:55

## 2019-04-14 VITALS — DIASTOLIC BLOOD PRESSURE: 81 MMHG | SYSTOLIC BLOOD PRESSURE: 148 MMHG

## 2019-04-14 VITALS — SYSTOLIC BLOOD PRESSURE: 120 MMHG | DIASTOLIC BLOOD PRESSURE: 76 MMHG

## 2019-04-14 VITALS — SYSTOLIC BLOOD PRESSURE: 121 MMHG | DIASTOLIC BLOOD PRESSURE: 80 MMHG

## 2019-04-14 VITALS — DIASTOLIC BLOOD PRESSURE: 65 MMHG | SYSTOLIC BLOOD PRESSURE: 107 MMHG

## 2019-04-14 LAB
ALBUMIN SERPL-MCNC: 3.2 G/DL (ref 3.4–5)
ALP SERPL-CCNC: 118 U/L (ref 45–117)
ALT SERPL-CCNC: 29 U/L (ref 12–78)
ANION GAP SERPL CALC-SCNC: 8 MMOL/L (ref 5–15)
BASOPHILS # BLD AUTO: 0.06 X10^3/UL (ref 0–0.1)
BASOPHILS NFR BLD AUTO: 1 % (ref 0–1)
BILIRUB SERPL-MCNC: 0.5 MG/DL (ref 0.2–1)
CALCIUM SERPL-MCNC: 8.5 MG/DL (ref 8.5–10.1)
CHLORIDE SERPL-SCNC: 107 MMOL/L (ref 98–107)
CREAT SERPL-MCNC: 0.79 MG/DL (ref 0.7–1.3)
CRP SERPL-MCNC: 0.45 MG/DL (ref 0.02–0.49)
EOSINOPHIL # BLD AUTO: 0.17 X10^3/UL (ref 0–0.4)
EOSINOPHIL NFR BLD AUTO: 4 % (ref 1–7)
ERYTHROCYTE [DISTWIDTH] IN BLOOD BY AUTOMATED COUNT: 18.5 % (ref 9.4–14.8)
ERYTHROCYTE [SEDIMENTATION RATE] IN BLOOD BY WESTERGREN METHOD: 41 MM/HR (ref 0–10)
HCT (SEDRATE): 35.5 % (ref 39.2–51.8)
LYMPHOCYTES # BLD AUTO: 1.26 X10^3/UL (ref 1–3.4)
LYMPHOCYTES NFR BLD AUTO: 27 % (ref 22–44)
MCH RBC QN AUTO: 26.3 PG (ref 27.5–34.5)
MCHC RBC AUTO-ENTMCNC: 32.3 G/DL (ref 33.2–36.2)
MCV RBC AUTO: 81.5 FL (ref 81–97)
MD: NO
MONOCYTES # BLD AUTO: 0.69 X10^3/UL (ref 0.2–0.8)
MONOCYTES NFR BLD AUTO: 15 % (ref 2–9)
NEUTROPHILS # BLD AUTO: 2.46 X10^3/UL (ref 1.8–6.8)
NEUTROPHILS NFR BLD AUTO: 53 % (ref 42–75)
PLATELET # BLD AUTO: 497 X10^3/UL (ref 130–400)
PMV BLD AUTO: 7.6 FL (ref 7.4–10.4)
PROT SERPL-MCNC: 7.6 G/DL (ref 6.4–8.2)
RBC # BLD AUTO: 4.32 X10^6/UL (ref 4.38–5.82)

## 2019-04-14 RX ADMIN — VANCOMYCIN HYDROCHLORIDE SCH MLS/HR: 1 INJECTION, POWDER, LYOPHILIZED, FOR SOLUTION INTRAVENOUS at 04:31

## 2019-04-14 RX ADMIN — AMPICILLIN SODIUM AND SULBACTAM SODIUM SCH MLS/HR: 2; 1 INJECTION, POWDER, FOR SOLUTION INTRAMUSCULAR; INTRAVENOUS at 13:47

## 2019-04-14 RX ADMIN — AMPICILLIN SODIUM AND SULBACTAM SODIUM SCH MLS/HR: 2; 1 INJECTION, POWDER, FOR SOLUTION INTRAMUSCULAR; INTRAVENOUS at 03:02

## 2019-04-14 RX ADMIN — CARVEDILOL SCH MG: 3.12 TABLET, FILM COATED ORAL at 04:31

## 2019-04-14 RX ADMIN — VANCOMYCIN HYDROCHLORIDE SCH MLS/HR: 1 INJECTION, POWDER, LYOPHILIZED, FOR SOLUTION INTRAVENOUS at 21:45

## 2019-04-14 RX ADMIN — CARVEDILOL SCH MG: 3.12 TABLET, FILM COATED ORAL at 17:30

## 2019-04-14 RX ADMIN — ENOXAPARIN SODIUM SCH MG: 40 INJECTION SUBCUTANEOUS at 09:36

## 2019-04-14 RX ADMIN — AMPICILLIN SODIUM AND SULBACTAM SODIUM SCH MLS/HR: 2; 1 INJECTION, POWDER, FOR SOLUTION INTRAMUSCULAR; INTRAVENOUS at 20:41

## 2019-04-14 RX ADMIN — AMPICILLIN SODIUM AND SULBACTAM SODIUM SCH MLS/HR: 2; 1 INJECTION, POWDER, FOR SOLUTION INTRAMUSCULAR; INTRAVENOUS at 09:36

## 2019-04-14 RX ADMIN — NICOTINE SCH PATCH: 14 PATCH, EXTENDED RELEASE TRANSDERMAL at 20:41

## 2019-04-15 VITALS — DIASTOLIC BLOOD PRESSURE: 79 MMHG | SYSTOLIC BLOOD PRESSURE: 128 MMHG

## 2019-04-15 VITALS — SYSTOLIC BLOOD PRESSURE: 110 MMHG | DIASTOLIC BLOOD PRESSURE: 76 MMHG

## 2019-04-15 VITALS — DIASTOLIC BLOOD PRESSURE: 75 MMHG | SYSTOLIC BLOOD PRESSURE: 112 MMHG

## 2019-04-15 VITALS — SYSTOLIC BLOOD PRESSURE: 121 MMHG | DIASTOLIC BLOOD PRESSURE: 80 MMHG

## 2019-04-15 LAB — CREAT SERPL-MCNC: 0.83 MG/DL (ref 0.7–1.3)

## 2019-04-15 RX ADMIN — CARVEDILOL SCH MG: 3.12 TABLET, FILM COATED ORAL at 06:09

## 2019-04-15 RX ADMIN — CARVEDILOL SCH MG: 3.12 TABLET, FILM COATED ORAL at 16:28

## 2019-04-15 RX ADMIN — AMPICILLIN SODIUM AND SULBACTAM SODIUM SCH MLS/HR: 2; 1 INJECTION, POWDER, FOR SOLUTION INTRAMUSCULAR; INTRAVENOUS at 02:18

## 2019-04-15 RX ADMIN — AMPICILLIN SODIUM AND SULBACTAM SODIUM SCH MLS/HR: 2; 1 INJECTION, POWDER, FOR SOLUTION INTRAMUSCULAR; INTRAVENOUS at 08:02

## 2019-04-15 RX ADMIN — ONDANSETRON PRN MG: 4 TABLET, ORALLY DISINTEGRATING ORAL at 19:45

## 2019-04-15 RX ADMIN — AMPICILLIN SODIUM AND SULBACTAM SODIUM SCH MLS/HR: 2; 1 INJECTION, POWDER, FOR SOLUTION INTRAMUSCULAR; INTRAVENOUS at 15:45

## 2019-04-15 RX ADMIN — FERROUS SULFATE TAB 325 MG (65 MG ELEMENTAL FE) SCH MG: 325 (65 FE) TAB at 13:54

## 2019-04-15 RX ADMIN — ENOXAPARIN SODIUM SCH MG: 40 INJECTION SUBCUTANEOUS at 08:02

## 2019-04-15 RX ADMIN — NICOTINE SCH PATCH: 14 PATCH, EXTENDED RELEASE TRANSDERMAL at 21:30

## 2019-04-15 RX ADMIN — AMPICILLIN SODIUM AND SULBACTAM SODIUM SCH MLS/HR: 2; 1 INJECTION, POWDER, FOR SOLUTION INTRAMUSCULAR; INTRAVENOUS at 21:31

## 2019-04-15 RX ADMIN — VANCOMYCIN HYDROCHLORIDE SCH MLS/HR: 1 INJECTION, POWDER, LYOPHILIZED, FOR SOLUTION INTRAVENOUS at 13:54

## 2019-04-16 VITALS — SYSTOLIC BLOOD PRESSURE: 123 MMHG | DIASTOLIC BLOOD PRESSURE: 80 MMHG

## 2019-04-16 VITALS — SYSTOLIC BLOOD PRESSURE: 108 MMHG | DIASTOLIC BLOOD PRESSURE: 75 MMHG

## 2019-04-16 VITALS — DIASTOLIC BLOOD PRESSURE: 72 MMHG | SYSTOLIC BLOOD PRESSURE: 116 MMHG

## 2019-04-16 VITALS — SYSTOLIC BLOOD PRESSURE: 107 MMHG | DIASTOLIC BLOOD PRESSURE: 71 MMHG

## 2019-04-16 RX ADMIN — ONDANSETRON PRN MG: 4 TABLET, ORALLY DISINTEGRATING ORAL at 22:37

## 2019-04-16 RX ADMIN — ENOXAPARIN SODIUM SCH MG: 40 INJECTION SUBCUTANEOUS at 07:54

## 2019-04-16 RX ADMIN — CARVEDILOL SCH MG: 3.12 TABLET, FILM COATED ORAL at 07:34

## 2019-04-16 RX ADMIN — AMPICILLIN SODIUM AND SULBACTAM SODIUM SCH MLS/HR: 2; 1 INJECTION, POWDER, FOR SOLUTION INTRAMUSCULAR; INTRAVENOUS at 10:28

## 2019-04-16 RX ADMIN — CARVEDILOL SCH MG: 3.12 TABLET, FILM COATED ORAL at 18:00

## 2019-04-16 RX ADMIN — VANCOMYCIN HYDROCHLORIDE SCH MLS/HR: 1 INJECTION, POWDER, LYOPHILIZED, FOR SOLUTION INTRAVENOUS at 07:51

## 2019-04-16 RX ADMIN — AMPICILLIN SODIUM AND SULBACTAM SODIUM SCH MLS/HR: 2; 1 INJECTION, POWDER, FOR SOLUTION INTRAMUSCULAR; INTRAVENOUS at 16:04

## 2019-04-16 RX ADMIN — NICOTINE SCH PATCH: 14 PATCH, EXTENDED RELEASE TRANSDERMAL at 21:10

## 2019-04-16 RX ADMIN — AMPICILLIN SODIUM AND SULBACTAM SODIUM SCH MLS/HR: 2; 1 INJECTION, POWDER, FOR SOLUTION INTRAMUSCULAR; INTRAVENOUS at 22:37

## 2019-04-16 RX ADMIN — AMPICILLIN SODIUM AND SULBACTAM SODIUM SCH MLS/HR: 2; 1 INJECTION, POWDER, FOR SOLUTION INTRAMUSCULAR; INTRAVENOUS at 03:25

## 2019-04-17 VITALS — DIASTOLIC BLOOD PRESSURE: 82 MMHG | SYSTOLIC BLOOD PRESSURE: 124 MMHG

## 2019-04-17 VITALS — DIASTOLIC BLOOD PRESSURE: 71 MMHG | SYSTOLIC BLOOD PRESSURE: 106 MMHG

## 2019-04-17 VITALS — SYSTOLIC BLOOD PRESSURE: 115 MMHG | DIASTOLIC BLOOD PRESSURE: 76 MMHG

## 2019-04-17 VITALS — DIASTOLIC BLOOD PRESSURE: 77 MMHG | SYSTOLIC BLOOD PRESSURE: 126 MMHG

## 2019-04-17 RX ADMIN — VANCOMYCIN HYDROCHLORIDE SCH MLS/HR: 1 INJECTION, POWDER, LYOPHILIZED, FOR SOLUTION INTRAVENOUS at 19:54

## 2019-04-17 RX ADMIN — NICOTINE SCH PATCH: 14 PATCH, EXTENDED RELEASE TRANSDERMAL at 19:54

## 2019-04-17 RX ADMIN — AMPICILLIN SODIUM AND SULBACTAM SODIUM SCH MLS/HR: 2; 1 INJECTION, POWDER, FOR SOLUTION INTRAMUSCULAR; INTRAVENOUS at 04:26

## 2019-04-17 RX ADMIN — AMPICILLIN SODIUM AND SULBACTAM SODIUM SCH MLS/HR: 2; 1 INJECTION, POWDER, FOR SOLUTION INTRAMUSCULAR; INTRAVENOUS at 16:28

## 2019-04-17 RX ADMIN — DIPHENHYDRAMINE HYDROCHLORIDE PRN MG: 25 CAPSULE ORAL at 20:06

## 2019-04-17 RX ADMIN — AMPICILLIN SODIUM AND SULBACTAM SODIUM SCH MLS/HR: 2; 1 INJECTION, POWDER, FOR SOLUTION INTRAMUSCULAR; INTRAVENOUS at 10:17

## 2019-04-17 RX ADMIN — AMPICILLIN SODIUM AND SULBACTAM SODIUM SCH MLS/HR: 2; 1 INJECTION, POWDER, FOR SOLUTION INTRAMUSCULAR; INTRAVENOUS at 22:21

## 2019-04-17 RX ADMIN — FERROUS SULFATE TAB 325 MG (65 MG ELEMENTAL FE) SCH MG: 325 (65 FE) TAB at 14:27

## 2019-04-17 RX ADMIN — VANCOMYCIN HYDROCHLORIDE SCH MLS/HR: 1 INJECTION, POWDER, LYOPHILIZED, FOR SOLUTION INTRAVENOUS at 02:14

## 2019-04-17 RX ADMIN — CARVEDILOL SCH MG: 3.12 TABLET, FILM COATED ORAL at 06:17

## 2019-04-17 RX ADMIN — CARVEDILOL SCH MG: 3.12 TABLET, FILM COATED ORAL at 17:14

## 2019-04-17 RX ADMIN — ENOXAPARIN SODIUM SCH MG: 40 INJECTION SUBCUTANEOUS at 08:45

## 2019-04-18 VITALS — SYSTOLIC BLOOD PRESSURE: 131 MMHG | DIASTOLIC BLOOD PRESSURE: 77 MMHG

## 2019-04-18 VITALS — DIASTOLIC BLOOD PRESSURE: 82 MMHG | SYSTOLIC BLOOD PRESSURE: 124 MMHG

## 2019-04-18 VITALS — DIASTOLIC BLOOD PRESSURE: 87 MMHG | SYSTOLIC BLOOD PRESSURE: 134 MMHG

## 2019-04-18 VITALS — DIASTOLIC BLOOD PRESSURE: 69 MMHG | SYSTOLIC BLOOD PRESSURE: 119 MMHG

## 2019-04-18 LAB
CREAT SERPL-MCNC: 0.85 MG/DL (ref 0.7–1.3)
CREAT SERPL-MCNC: 0.87 MG/DL (ref 0.7–1.3)
VANCOMYCIN,TROUGH: 15.1 MCG/ML (ref 5–10)

## 2019-04-18 PROCEDURE — 02HV33Z INSERTION OF INFUSION DEVICE INTO SUPERIOR VENA CAVA, PERCUTANEOUS APPROACH: ICD-10-PCS | Performed by: RADIOLOGY

## 2019-04-18 PROCEDURE — B548ZZA ULTRASONOGRAPHY OF SUPERIOR VENA CAVA, GUIDANCE: ICD-10-PCS | Performed by: RADIOLOGY

## 2019-04-18 PROCEDURE — B5181ZA FLUOROSCOPY OF SUPERIOR VENA CAVA USING LOW OSMOLAR CONTRAST, GUIDANCE: ICD-10-PCS | Performed by: RADIOLOGY

## 2019-04-18 RX ADMIN — AMPICILLIN SODIUM AND SULBACTAM SODIUM SCH MLS/HR: 2; 1 INJECTION, POWDER, FOR SOLUTION INTRAMUSCULAR; INTRAVENOUS at 04:41

## 2019-04-18 RX ADMIN — AMPICILLIN SODIUM AND SULBACTAM SODIUM SCH MLS/HR: 2; 1 INJECTION, POWDER, FOR SOLUTION INTRAMUSCULAR; INTRAVENOUS at 22:50

## 2019-04-18 RX ADMIN — NICOTINE SCH PATCH: 14 PATCH, EXTENDED RELEASE TRANSDERMAL at 21:26

## 2019-04-18 RX ADMIN — ENOXAPARIN SODIUM SCH MG: 40 INJECTION SUBCUTANEOUS at 08:54

## 2019-04-18 RX ADMIN — CARVEDILOL SCH MG: 3.12 TABLET, FILM COATED ORAL at 18:13

## 2019-04-18 RX ADMIN — AMPICILLIN SODIUM AND SULBACTAM SODIUM SCH MLS/HR: 2; 1 INJECTION, POWDER, FOR SOLUTION INTRAMUSCULAR; INTRAVENOUS at 09:57

## 2019-04-18 RX ADMIN — AMPICILLIN SODIUM AND SULBACTAM SODIUM SCH MLS/HR: 2; 1 INJECTION, POWDER, FOR SOLUTION INTRAMUSCULAR; INTRAVENOUS at 17:14

## 2019-04-18 RX ADMIN — VANCOMYCIN HYDROCHLORIDE SCH MLS/HR: 1 INJECTION, POWDER, LYOPHILIZED, FOR SOLUTION INTRAVENOUS at 14:31

## 2019-04-18 RX ADMIN — CARVEDILOL SCH MG: 3.12 TABLET, FILM COATED ORAL at 05:39

## 2019-04-19 VITALS — SYSTOLIC BLOOD PRESSURE: 115 MMHG | DIASTOLIC BLOOD PRESSURE: 80 MMHG

## 2019-04-19 VITALS — DIASTOLIC BLOOD PRESSURE: 81 MMHG | SYSTOLIC BLOOD PRESSURE: 129 MMHG

## 2019-04-19 VITALS — DIASTOLIC BLOOD PRESSURE: 83 MMHG | SYSTOLIC BLOOD PRESSURE: 132 MMHG

## 2019-04-19 VITALS — DIASTOLIC BLOOD PRESSURE: 75 MMHG | SYSTOLIC BLOOD PRESSURE: 127 MMHG

## 2019-04-19 RX ADMIN — ENOXAPARIN SODIUM SCH MG: 40 INJECTION SUBCUTANEOUS at 07:49

## 2019-04-19 RX ADMIN — VANCOMYCIN HYDROCHLORIDE SCH MLS/HR: 1 INJECTION, POWDER, LYOPHILIZED, FOR SOLUTION INTRAVENOUS at 07:48

## 2019-04-19 RX ADMIN — CARVEDILOL SCH MG: 3.12 TABLET, FILM COATED ORAL at 04:19

## 2019-04-19 RX ADMIN — AMPICILLIN SODIUM AND SULBACTAM SODIUM SCH MLS/HR: 2; 1 INJECTION, POWDER, FOR SOLUTION INTRAMUSCULAR; INTRAVENOUS at 22:15

## 2019-04-19 RX ADMIN — AMPICILLIN SODIUM AND SULBACTAM SODIUM SCH MLS/HR: 2; 1 INJECTION, POWDER, FOR SOLUTION INTRAMUSCULAR; INTRAVENOUS at 04:19

## 2019-04-19 RX ADMIN — DIPHENHYDRAMINE HYDROCHLORIDE PRN MG: 25 CAPSULE ORAL at 21:35

## 2019-04-19 RX ADMIN — AMPICILLIN SODIUM AND SULBACTAM SODIUM SCH MLS/HR: 2; 1 INJECTION, POWDER, FOR SOLUTION INTRAMUSCULAR; INTRAVENOUS at 16:23

## 2019-04-19 RX ADMIN — NICOTINE SCH PATCH: 14 PATCH, EXTENDED RELEASE TRANSDERMAL at 20:06

## 2019-04-19 RX ADMIN — CARVEDILOL SCH MG: 3.12 TABLET, FILM COATED ORAL at 19:08

## 2019-04-19 RX ADMIN — FERROUS SULFATE TAB 325 MG (65 MG ELEMENTAL FE) SCH MG: 325 (65 FE) TAB at 14:00

## 2019-04-19 RX ADMIN — AMPICILLIN SODIUM AND SULBACTAM SODIUM SCH MLS/HR: 2; 1 INJECTION, POWDER, FOR SOLUTION INTRAMUSCULAR; INTRAVENOUS at 10:14

## 2019-04-20 VITALS — DIASTOLIC BLOOD PRESSURE: 83 MMHG | SYSTOLIC BLOOD PRESSURE: 130 MMHG

## 2019-04-20 VITALS — DIASTOLIC BLOOD PRESSURE: 74 MMHG | SYSTOLIC BLOOD PRESSURE: 154 MMHG

## 2019-04-20 VITALS — SYSTOLIC BLOOD PRESSURE: 125 MMHG | DIASTOLIC BLOOD PRESSURE: 72 MMHG

## 2019-04-20 VITALS — DIASTOLIC BLOOD PRESSURE: 86 MMHG | SYSTOLIC BLOOD PRESSURE: 144 MMHG

## 2019-04-20 RX ADMIN — AMPICILLIN SODIUM AND SULBACTAM SODIUM SCH MLS/HR: 2; 1 INJECTION, POWDER, FOR SOLUTION INTRAMUSCULAR; INTRAVENOUS at 22:41

## 2019-04-20 RX ADMIN — AMPICILLIN SODIUM AND SULBACTAM SODIUM SCH MLS/HR: 2; 1 INJECTION, POWDER, FOR SOLUTION INTRAMUSCULAR; INTRAVENOUS at 16:54

## 2019-04-20 RX ADMIN — NICOTINE SCH PATCH: 14 PATCH, EXTENDED RELEASE TRANSDERMAL at 21:09

## 2019-04-20 RX ADMIN — AMPICILLIN SODIUM AND SULBACTAM SODIUM SCH MLS/HR: 2; 1 INJECTION, POWDER, FOR SOLUTION INTRAMUSCULAR; INTRAVENOUS at 10:56

## 2019-04-20 RX ADMIN — DIPHENHYDRAMINE HYDROCHLORIDE PRN MG: 25 CAPSULE ORAL at 21:22

## 2019-04-20 RX ADMIN — CARVEDILOL SCH MG: 3.12 TABLET, FILM COATED ORAL at 04:32

## 2019-04-20 RX ADMIN — ENOXAPARIN SODIUM SCH MG: 40 INJECTION SUBCUTANEOUS at 09:05

## 2019-04-20 RX ADMIN — VANCOMYCIN HYDROCHLORIDE SCH MLS/HR: 1 INJECTION, POWDER, LYOPHILIZED, FOR SOLUTION INTRAVENOUS at 02:04

## 2019-04-20 RX ADMIN — VANCOMYCIN HYDROCHLORIDE SCH MLS/HR: 1 INJECTION, POWDER, LYOPHILIZED, FOR SOLUTION INTRAVENOUS at 19:50

## 2019-04-20 RX ADMIN — CARVEDILOL SCH MG: 3.12 TABLET, FILM COATED ORAL at 18:27

## 2019-04-20 RX ADMIN — AMPICILLIN SODIUM AND SULBACTAM SODIUM SCH MLS/HR: 2; 1 INJECTION, POWDER, FOR SOLUTION INTRAMUSCULAR; INTRAVENOUS at 04:32

## 2019-04-21 VITALS — SYSTOLIC BLOOD PRESSURE: 118 MMHG | DIASTOLIC BLOOD PRESSURE: 78 MMHG

## 2019-04-21 VITALS — DIASTOLIC BLOOD PRESSURE: 85 MMHG | SYSTOLIC BLOOD PRESSURE: 134 MMHG

## 2019-04-21 VITALS — SYSTOLIC BLOOD PRESSURE: 154 MMHG | DIASTOLIC BLOOD PRESSURE: 96 MMHG

## 2019-04-21 VITALS — DIASTOLIC BLOOD PRESSURE: 88 MMHG | SYSTOLIC BLOOD PRESSURE: 136 MMHG

## 2019-04-21 LAB
ALBUMIN SERPL-MCNC: 3.5 G/DL (ref 3.4–5)
ALP SERPL-CCNC: 128 U/L (ref 45–117)
ALT SERPL-CCNC: 36 U/L (ref 12–78)
ANION GAP SERPL CALC-SCNC: 7 MMOL/L (ref 5–15)
BASOPHILS # BLD AUTO: 0.04 X10^3/UL (ref 0–0.1)
BASOPHILS NFR BLD AUTO: 1 % (ref 0–1)
BILIRUB SERPL-MCNC: 0.2 MG/DL (ref 0.2–1)
CALCIUM SERPL-MCNC: 8.7 MG/DL (ref 8.5–10.1)
CHLORIDE SERPL-SCNC: 107 MMOL/L (ref 98–107)
CREAT SERPL-MCNC: 0.79 MG/DL (ref 0.7–1.3)
CREAT SERPL-MCNC: 0.91 MG/DL (ref 0.7–1.3)
CRP SERPL-MCNC: 0.58 MG/DL (ref 0.02–0.49)
EOSINOPHIL # BLD AUTO: 0.21 X10^3/UL (ref 0–0.4)
EOSINOPHIL NFR BLD AUTO: 4 % (ref 1–7)
ERYTHROCYTE [DISTWIDTH] IN BLOOD BY AUTOMATED COUNT: 18.2 % (ref 9.4–14.8)
ERYTHROCYTE [SEDIMENTATION RATE] IN BLOOD BY WESTERGREN METHOD: 41 MM/HR (ref 0–10)
HCT (SEDRATE): 35.7 % (ref 39.2–51.8)
LYMPHOCYTES # BLD AUTO: 1.28 X10^3/UL (ref 1–3.4)
LYMPHOCYTES NFR BLD AUTO: 24 % (ref 22–44)
MCH RBC QN AUTO: 26.2 PG (ref 27.5–34.5)
MCHC RBC AUTO-ENTMCNC: 32.3 G/DL (ref 33.2–36.2)
MCV RBC AUTO: 81.1 FL (ref 81–97)
MD: NO
MONOCYTES # BLD AUTO: 0.64 X10^3/UL (ref 0.2–0.8)
MONOCYTES NFR BLD AUTO: 12 % (ref 2–9)
NEUTROPHILS # BLD AUTO: 3.21 X10^3/UL (ref 1.8–6.8)
NEUTROPHILS NFR BLD AUTO: 60 % (ref 42–75)
PLATELET # BLD AUTO: 373 X10^3/UL (ref 130–400)
PMV BLD AUTO: 7.5 FL (ref 7.4–10.4)
PROT SERPL-MCNC: 7.9 G/DL (ref 6.4–8.2)
RBC # BLD AUTO: 4.41 X10^6/UL (ref 4.38–5.82)

## 2019-04-21 RX ADMIN — CARVEDILOL SCH MG: 3.12 TABLET, FILM COATED ORAL at 05:01

## 2019-04-21 RX ADMIN — AMPICILLIN SODIUM AND SULBACTAM SODIUM SCH MLS/HR: 2; 1 INJECTION, POWDER, FOR SOLUTION INTRAMUSCULAR; INTRAVENOUS at 16:40

## 2019-04-21 RX ADMIN — NICOTINE SCH PATCH: 14 PATCH, EXTENDED RELEASE TRANSDERMAL at 21:30

## 2019-04-21 RX ADMIN — AMPICILLIN SODIUM AND SULBACTAM SODIUM SCH MLS/HR: 2; 1 INJECTION, POWDER, FOR SOLUTION INTRAMUSCULAR; INTRAVENOUS at 10:34

## 2019-04-21 RX ADMIN — DIPHENHYDRAMINE HYDROCHLORIDE PRN MG: 25 CAPSULE ORAL at 23:18

## 2019-04-21 RX ADMIN — FERROUS SULFATE TAB 325 MG (65 MG ELEMENTAL FE) SCH MG: 325 (65 FE) TAB at 13:46

## 2019-04-21 RX ADMIN — ENOXAPARIN SODIUM SCH MG: 40 INJECTION SUBCUTANEOUS at 09:37

## 2019-04-21 RX ADMIN — VANCOMYCIN HYDROCHLORIDE SCH MLS/HR: 1 INJECTION, POWDER, LYOPHILIZED, FOR SOLUTION INTRAVENOUS at 13:51

## 2019-04-21 RX ADMIN — AMPICILLIN SODIUM AND SULBACTAM SODIUM SCH MLS/HR: 2; 1 INJECTION, POWDER, FOR SOLUTION INTRAMUSCULAR; INTRAVENOUS at 05:01

## 2019-04-21 RX ADMIN — AMPICILLIN SODIUM AND SULBACTAM SODIUM SCH MLS/HR: 2; 1 INJECTION, POWDER, FOR SOLUTION INTRAMUSCULAR; INTRAVENOUS at 23:13

## 2019-04-21 RX ADMIN — CARVEDILOL SCH MG: 3.12 TABLET, FILM COATED ORAL at 17:23

## 2019-04-22 VITALS — DIASTOLIC BLOOD PRESSURE: 72 MMHG | SYSTOLIC BLOOD PRESSURE: 115 MMHG

## 2019-04-22 VITALS — DIASTOLIC BLOOD PRESSURE: 77 MMHG | SYSTOLIC BLOOD PRESSURE: 117 MMHG

## 2019-04-22 VITALS — DIASTOLIC BLOOD PRESSURE: 68 MMHG | SYSTOLIC BLOOD PRESSURE: 142 MMHG

## 2019-04-22 VITALS — DIASTOLIC BLOOD PRESSURE: 90 MMHG | SYSTOLIC BLOOD PRESSURE: 136 MMHG

## 2019-04-22 RX ADMIN — CARVEDILOL SCH MG: 3.12 TABLET, FILM COATED ORAL at 05:28

## 2019-04-22 RX ADMIN — VANCOMYCIN HYDROCHLORIDE SCH MLS/HR: 1 INJECTION, POWDER, LYOPHILIZED, FOR SOLUTION INTRAVENOUS at 07:34

## 2019-04-22 RX ADMIN — AMPICILLIN SODIUM AND SULBACTAM SODIUM SCH MLS/HR: 2; 1 INJECTION, POWDER, FOR SOLUTION INTRAMUSCULAR; INTRAVENOUS at 23:36

## 2019-04-22 RX ADMIN — AMPICILLIN SODIUM AND SULBACTAM SODIUM SCH MLS/HR: 2; 1 INJECTION, POWDER, FOR SOLUTION INTRAMUSCULAR; INTRAVENOUS at 11:21

## 2019-04-22 RX ADMIN — CARVEDILOL SCH MG: 3.12 TABLET, FILM COATED ORAL at 18:39

## 2019-04-22 RX ADMIN — NICOTINE SCH PATCH: 14 PATCH, EXTENDED RELEASE TRANSDERMAL at 20:21

## 2019-04-22 RX ADMIN — ENOXAPARIN SODIUM SCH MG: 40 INJECTION SUBCUTANEOUS at 07:34

## 2019-04-22 RX ADMIN — AMPICILLIN SODIUM AND SULBACTAM SODIUM SCH MLS/HR: 2; 1 INJECTION, POWDER, FOR SOLUTION INTRAMUSCULAR; INTRAVENOUS at 18:39

## 2019-04-22 RX ADMIN — DIPHENHYDRAMINE HYDROCHLORIDE PRN MG: 25 CAPSULE ORAL at 23:34

## 2019-04-22 RX ADMIN — AMPICILLIN SODIUM AND SULBACTAM SODIUM SCH MLS/HR: 2; 1 INJECTION, POWDER, FOR SOLUTION INTRAMUSCULAR; INTRAVENOUS at 05:25

## 2019-04-23 VITALS — SYSTOLIC BLOOD PRESSURE: 117 MMHG | DIASTOLIC BLOOD PRESSURE: 78 MMHG

## 2019-04-23 VITALS — SYSTOLIC BLOOD PRESSURE: 114 MMHG | DIASTOLIC BLOOD PRESSURE: 72 MMHG

## 2019-04-23 VITALS — DIASTOLIC BLOOD PRESSURE: 79 MMHG | SYSTOLIC BLOOD PRESSURE: 131 MMHG

## 2019-04-23 VITALS — DIASTOLIC BLOOD PRESSURE: 76 MMHG | SYSTOLIC BLOOD PRESSURE: 144 MMHG

## 2019-04-23 RX ADMIN — AMPICILLIN SODIUM AND SULBACTAM SODIUM SCH MLS/HR: 2; 1 INJECTION, POWDER, FOR SOLUTION INTRAMUSCULAR; INTRAVENOUS at 23:20

## 2019-04-23 RX ADMIN — CARVEDILOL SCH MG: 3.12 TABLET, FILM COATED ORAL at 17:38

## 2019-04-23 RX ADMIN — VANCOMYCIN HYDROCHLORIDE SCH MLS/HR: 1 INJECTION, POWDER, LYOPHILIZED, FOR SOLUTION INTRAVENOUS at 20:31

## 2019-04-23 RX ADMIN — AMPICILLIN SODIUM AND SULBACTAM SODIUM SCH MLS/HR: 2; 1 INJECTION, POWDER, FOR SOLUTION INTRAMUSCULAR; INTRAVENOUS at 11:25

## 2019-04-23 RX ADMIN — FERROUS SULFATE TAB 325 MG (65 MG ELEMENTAL FE) SCH MG: 325 (65 FE) TAB at 13:59

## 2019-04-23 RX ADMIN — CARVEDILOL SCH MG: 3.12 TABLET, FILM COATED ORAL at 05:34

## 2019-04-23 RX ADMIN — AMPICILLIN SODIUM AND SULBACTAM SODIUM SCH MLS/HR: 2; 1 INJECTION, POWDER, FOR SOLUTION INTRAMUSCULAR; INTRAVENOUS at 17:38

## 2019-04-23 RX ADMIN — NICOTINE SCH PATCH: 14 PATCH, EXTENDED RELEASE TRANSDERMAL at 21:30

## 2019-04-23 RX ADMIN — VANCOMYCIN HYDROCHLORIDE SCH MLS/HR: 1 INJECTION, POWDER, LYOPHILIZED, FOR SOLUTION INTRAVENOUS at 02:01

## 2019-04-23 RX ADMIN — ENOXAPARIN SODIUM SCH MG: 40 INJECTION SUBCUTANEOUS at 10:07

## 2019-04-23 RX ADMIN — AMPICILLIN SODIUM AND SULBACTAM SODIUM SCH MLS/HR: 2; 1 INJECTION, POWDER, FOR SOLUTION INTRAMUSCULAR; INTRAVENOUS at 05:34

## 2019-04-24 VITALS — DIASTOLIC BLOOD PRESSURE: 92 MMHG | SYSTOLIC BLOOD PRESSURE: 149 MMHG

## 2019-04-24 VITALS — DIASTOLIC BLOOD PRESSURE: 63 MMHG | SYSTOLIC BLOOD PRESSURE: 104 MMHG

## 2019-04-24 VITALS — SYSTOLIC BLOOD PRESSURE: 133 MMHG | DIASTOLIC BLOOD PRESSURE: 88 MMHG

## 2019-04-24 VITALS — DIASTOLIC BLOOD PRESSURE: 85 MMHG | SYSTOLIC BLOOD PRESSURE: 148 MMHG

## 2019-04-24 LAB — CREAT SERPL-MCNC: 0.92 MG/DL (ref 0.7–1.3)

## 2019-04-24 RX ADMIN — DIPHENHYDRAMINE HYDROCHLORIDE PRN MG: 25 CAPSULE ORAL at 23:49

## 2019-04-24 RX ADMIN — AMPICILLIN SODIUM AND SULBACTAM SODIUM SCH MLS/HR: 2; 1 INJECTION, POWDER, FOR SOLUTION INTRAMUSCULAR; INTRAVENOUS at 05:26

## 2019-04-24 RX ADMIN — CARVEDILOL SCH MG: 3.12 TABLET, FILM COATED ORAL at 05:27

## 2019-04-24 RX ADMIN — CARVEDILOL SCH MG: 3.12 TABLET, FILM COATED ORAL at 18:47

## 2019-04-24 RX ADMIN — AMPICILLIN SODIUM AND SULBACTAM SODIUM SCH MLS/HR: 2; 1 INJECTION, POWDER, FOR SOLUTION INTRAMUSCULAR; INTRAVENOUS at 17:02

## 2019-04-24 RX ADMIN — DIPHENHYDRAMINE HYDROCHLORIDE PRN MG: 25 CAPSULE ORAL at 01:32

## 2019-04-24 RX ADMIN — NICOTINE SCH PATCH: 14 PATCH, EXTENDED RELEASE TRANSDERMAL at 21:30

## 2019-04-24 RX ADMIN — VANCOMYCIN HYDROCHLORIDE SCH MLS/HR: 1 INJECTION, POWDER, LYOPHILIZED, FOR SOLUTION INTRAVENOUS at 09:46

## 2019-04-24 RX ADMIN — ENOXAPARIN SODIUM SCH MG: 40 INJECTION SUBCUTANEOUS at 09:46

## 2019-04-24 RX ADMIN — AMPICILLIN SODIUM AND SULBACTAM SODIUM SCH MLS/HR: 2; 1 INJECTION, POWDER, FOR SOLUTION INTRAMUSCULAR; INTRAVENOUS at 23:15

## 2019-04-24 RX ADMIN — AMPICILLIN SODIUM AND SULBACTAM SODIUM SCH MLS/HR: 2; 1 INJECTION, POWDER, FOR SOLUTION INTRAMUSCULAR; INTRAVENOUS at 12:25

## 2019-04-25 VITALS — SYSTOLIC BLOOD PRESSURE: 144 MMHG | DIASTOLIC BLOOD PRESSURE: 90 MMHG

## 2019-04-25 VITALS — DIASTOLIC BLOOD PRESSURE: 74 MMHG | SYSTOLIC BLOOD PRESSURE: 124 MMHG

## 2019-04-25 VITALS — DIASTOLIC BLOOD PRESSURE: 91 MMHG | SYSTOLIC BLOOD PRESSURE: 148 MMHG

## 2019-04-25 VITALS — SYSTOLIC BLOOD PRESSURE: 127 MMHG | DIASTOLIC BLOOD PRESSURE: 85 MMHG

## 2019-04-25 RX ADMIN — CARVEDILOL SCH MG: 3.12 TABLET, FILM COATED ORAL at 18:00

## 2019-04-25 RX ADMIN — AMPICILLIN SODIUM AND SULBACTAM SODIUM SCH MLS/HR: 2; 1 INJECTION, POWDER, FOR SOLUTION INTRAMUSCULAR; INTRAVENOUS at 05:09

## 2019-04-25 RX ADMIN — VANCOMYCIN HYDROCHLORIDE SCH MLS/HR: 1 INJECTION, POWDER, LYOPHILIZED, FOR SOLUTION INTRAVENOUS at 21:26

## 2019-04-25 RX ADMIN — VANCOMYCIN HYDROCHLORIDE SCH MLS/HR: 1 INJECTION, POWDER, LYOPHILIZED, FOR SOLUTION INTRAVENOUS at 03:28

## 2019-04-25 RX ADMIN — ENOXAPARIN SODIUM SCH MG: 40 INJECTION SUBCUTANEOUS at 09:02

## 2019-04-25 RX ADMIN — NICOTINE SCH PATCH: 14 PATCH, EXTENDED RELEASE TRANSDERMAL at 21:30

## 2019-04-25 RX ADMIN — AMPICILLIN SODIUM AND SULBACTAM SODIUM SCH MLS/HR: 2; 1 INJECTION, POWDER, FOR SOLUTION INTRAMUSCULAR; INTRAVENOUS at 11:21

## 2019-04-25 RX ADMIN — AMPICILLIN SODIUM AND SULBACTAM SODIUM SCH MLS/HR: 2; 1 INJECTION, POWDER, FOR SOLUTION INTRAMUSCULAR; INTRAVENOUS at 17:40

## 2019-04-25 RX ADMIN — AMPICILLIN SODIUM AND SULBACTAM SODIUM SCH MLS/HR: 2; 1 INJECTION, POWDER, FOR SOLUTION INTRAMUSCULAR; INTRAVENOUS at 23:45

## 2019-04-25 RX ADMIN — FERROUS SULFATE TAB 325 MG (65 MG ELEMENTAL FE) SCH MG: 325 (65 FE) TAB at 13:53

## 2019-04-25 RX ADMIN — CARVEDILOL SCH MG: 3.12 TABLET, FILM COATED ORAL at 05:09

## 2019-04-26 VITALS — SYSTOLIC BLOOD PRESSURE: 119 MMHG | DIASTOLIC BLOOD PRESSURE: 70 MMHG

## 2019-04-26 VITALS — DIASTOLIC BLOOD PRESSURE: 82 MMHG | SYSTOLIC BLOOD PRESSURE: 129 MMHG

## 2019-04-26 VITALS — DIASTOLIC BLOOD PRESSURE: 78 MMHG | SYSTOLIC BLOOD PRESSURE: 136 MMHG

## 2019-04-26 RX ADMIN — ENOXAPARIN SODIUM SCH MG: 40 INJECTION SUBCUTANEOUS at 09:59

## 2019-04-26 RX ADMIN — AMPICILLIN SODIUM AND SULBACTAM SODIUM SCH MLS/HR: 2; 1 INJECTION, POWDER, FOR SOLUTION INTRAMUSCULAR; INTRAVENOUS at 21:54

## 2019-04-26 RX ADMIN — AMPICILLIN SODIUM AND SULBACTAM SODIUM SCH MLS/HR: 2; 1 INJECTION, POWDER, FOR SOLUTION INTRAMUSCULAR; INTRAVENOUS at 05:43

## 2019-04-26 RX ADMIN — CARVEDILOL SCH MG: 3.12 TABLET, FILM COATED ORAL at 18:18

## 2019-04-26 RX ADMIN — NICOTINE SCH PATCH: 14 PATCH, EXTENDED RELEASE TRANSDERMAL at 20:12

## 2019-04-26 RX ADMIN — AMPICILLIN SODIUM AND SULBACTAM SODIUM SCH MLS/HR: 2; 1 INJECTION, POWDER, FOR SOLUTION INTRAMUSCULAR; INTRAVENOUS at 11:36

## 2019-04-26 RX ADMIN — CARVEDILOL SCH MG: 3.12 TABLET, FILM COATED ORAL at 09:59

## 2019-04-26 RX ADMIN — VANCOMYCIN HYDROCHLORIDE SCH MLS/HR: 1 INJECTION, POWDER, LYOPHILIZED, FOR SOLUTION INTRAVENOUS at 16:34

## 2019-04-27 VITALS — DIASTOLIC BLOOD PRESSURE: 86 MMHG | SYSTOLIC BLOOD PRESSURE: 144 MMHG

## 2019-04-27 VITALS — DIASTOLIC BLOOD PRESSURE: 72 MMHG | SYSTOLIC BLOOD PRESSURE: 120 MMHG

## 2019-04-27 VITALS — SYSTOLIC BLOOD PRESSURE: 102 MMHG | DIASTOLIC BLOOD PRESSURE: 68 MMHG

## 2019-04-27 LAB
CREAT SERPL-MCNC: 0.85 MG/DL (ref 0.7–1.3)
VANCOMYCIN,TROUGH: 15.2 MCG/ML (ref 5–10)

## 2019-04-27 PROCEDURE — B548ZZA ULTRASONOGRAPHY OF SUPERIOR VENA CAVA, GUIDANCE: ICD-10-PCS | Performed by: RADIOLOGY

## 2019-04-27 PROCEDURE — B5181ZA FLUOROSCOPY OF SUPERIOR VENA CAVA USING LOW OSMOLAR CONTRAST, GUIDANCE: ICD-10-PCS | Performed by: RADIOLOGY

## 2019-04-27 PROCEDURE — 02HV33Z INSERTION OF INFUSION DEVICE INTO SUPERIOR VENA CAVA, PERCUTANEOUS APPROACH: ICD-10-PCS | Performed by: RADIOLOGY

## 2019-04-27 RX ADMIN — CARVEDILOL SCH MG: 3.12 TABLET, FILM COATED ORAL at 04:43

## 2019-04-27 RX ADMIN — AMPICILLIN SODIUM AND SULBACTAM SODIUM SCH MLS/HR: 2; 1 INJECTION, POWDER, FOR SOLUTION INTRAMUSCULAR; INTRAVENOUS at 22:49

## 2019-04-27 RX ADMIN — VANCOMYCIN HYDROCHLORIDE SCH MLS/HR: 1 INJECTION, POWDER, LYOPHILIZED, FOR SOLUTION INTRAVENOUS at 09:00

## 2019-04-27 RX ADMIN — AMPICILLIN SODIUM AND SULBACTAM SODIUM SCH MLS/HR: 2; 1 INJECTION, POWDER, FOR SOLUTION INTRAMUSCULAR; INTRAVENOUS at 03:55

## 2019-04-27 RX ADMIN — CARVEDILOL SCH MG: 3.12 TABLET, FILM COATED ORAL at 18:00

## 2019-04-27 RX ADMIN — AMPICILLIN SODIUM AND SULBACTAM SODIUM SCH MLS/HR: 2; 1 INJECTION, POWDER, FOR SOLUTION INTRAMUSCULAR; INTRAVENOUS at 10:00

## 2019-04-27 RX ADMIN — NICOTINE SCH PATCH: 14 PATCH, EXTENDED RELEASE TRANSDERMAL at 21:30

## 2019-04-27 RX ADMIN — AMPICILLIN SODIUM AND SULBACTAM SODIUM SCH MLS/HR: 2; 1 INJECTION, POWDER, FOR SOLUTION INTRAMUSCULAR; INTRAVENOUS at 16:00

## 2019-04-27 RX ADMIN — FERROUS SULFATE TAB 325 MG (65 MG ELEMENTAL FE) SCH MG: 325 (65 FE) TAB at 14:08

## 2019-04-27 RX ADMIN — ENOXAPARIN SODIUM SCH MG: 40 INJECTION SUBCUTANEOUS at 09:00

## 2019-04-28 VITALS — DIASTOLIC BLOOD PRESSURE: 80 MMHG | SYSTOLIC BLOOD PRESSURE: 122 MMHG

## 2019-04-28 VITALS — DIASTOLIC BLOOD PRESSURE: 72 MMHG | SYSTOLIC BLOOD PRESSURE: 115 MMHG

## 2019-04-28 VITALS — DIASTOLIC BLOOD PRESSURE: 67 MMHG | SYSTOLIC BLOOD PRESSURE: 138 MMHG

## 2019-04-28 VITALS — DIASTOLIC BLOOD PRESSURE: 65 MMHG | SYSTOLIC BLOOD PRESSURE: 137 MMHG

## 2019-04-28 VITALS — SYSTOLIC BLOOD PRESSURE: 121 MMHG | DIASTOLIC BLOOD PRESSURE: 76 MMHG

## 2019-04-28 LAB
ALBUMIN SERPL-MCNC: 3.2 G/DL (ref 3.4–5)
ALP SERPL-CCNC: 123 U/L (ref 45–117)
ALT SERPL-CCNC: 49 U/L (ref 12–78)
ANION GAP SERPL CALC-SCNC: 5 MMOL/L (ref 5–15)
BASOPHILS # BLD AUTO: 0.06 X10^3/UL (ref 0–0.1)
BASOPHILS NFR BLD AUTO: 1 % (ref 0–1)
BILIRUB SERPL-MCNC: 0.3 MG/DL (ref 0.2–1)
CALCIUM SERPL-MCNC: 9 MG/DL (ref 8.5–10.1)
CHLORIDE SERPL-SCNC: 106 MMOL/L (ref 98–107)
CREAT SERPL-MCNC: 0.8 MG/DL (ref 0.7–1.3)
CRP SERPL-MCNC: 0.73 MG/DL (ref 0.02–0.49)
EOSINOPHIL # BLD AUTO: 0.17 X10^3/UL (ref 0–0.4)
EOSINOPHIL NFR BLD AUTO: 3 % (ref 1–7)
ERYTHROCYTE [DISTWIDTH] IN BLOOD BY AUTOMATED COUNT: 18.1 % (ref 9.4–14.8)
ERYTHROCYTE [SEDIMENTATION RATE] IN BLOOD BY WESTERGREN METHOD: 43 MM/HR (ref 0–10)
HCT (SEDRATE): 34.1 % (ref 39.2–51.8)
LYMPHOCYTES # BLD AUTO: 1.41 X10^3/UL (ref 1–3.4)
LYMPHOCYTES NFR BLD AUTO: 22 % (ref 22–44)
MCH RBC QN AUTO: 25.6 PG (ref 27.5–34.5)
MCHC RBC AUTO-ENTMCNC: 31.9 G/DL (ref 33.2–36.2)
MCV RBC AUTO: 80.2 FL (ref 81–97)
MD: NO
MONOCYTES # BLD AUTO: 1 X10^3/UL (ref 0.2–0.8)
MONOCYTES NFR BLD AUTO: 16 % (ref 2–9)
NEUTROPHILS # BLD AUTO: 3.83 X10^3/UL (ref 1.8–6.8)
NEUTROPHILS NFR BLD AUTO: 59 % (ref 42–75)
PLATELET # BLD AUTO: 380 X10^3/UL (ref 130–400)
PMV BLD AUTO: 7.2 FL (ref 7.4–10.4)
PROT SERPL-MCNC: 7.4 G/DL (ref 6.4–8.2)
RBC # BLD AUTO: 4.26 X10^6/UL (ref 4.38–5.82)

## 2019-04-28 RX ADMIN — OLANZAPINE SCH MG: 2.5 TABLET, FILM COATED ORAL at 21:16

## 2019-04-28 RX ADMIN — AMPICILLIN SODIUM AND SULBACTAM SODIUM SCH MLS/HR: 2; 1 INJECTION, POWDER, FOR SOLUTION INTRAMUSCULAR; INTRAVENOUS at 16:17

## 2019-04-28 RX ADMIN — AMPICILLIN SODIUM AND SULBACTAM SODIUM SCH MLS/HR: 2; 1 INJECTION, POWDER, FOR SOLUTION INTRAMUSCULAR; INTRAVENOUS at 21:59

## 2019-04-28 RX ADMIN — AMPICILLIN SODIUM AND SULBACTAM SODIUM SCH MLS/HR: 2; 1 INJECTION, POWDER, FOR SOLUTION INTRAMUSCULAR; INTRAVENOUS at 03:56

## 2019-04-28 RX ADMIN — OLANZAPINE SCH MG: 2.5 TABLET, FILM COATED ORAL at 10:28

## 2019-04-28 RX ADMIN — OLANZAPINE SCH MG: 2.5 TABLET, FILM COATED ORAL at 16:17

## 2019-04-28 RX ADMIN — AMPICILLIN SODIUM AND SULBACTAM SODIUM SCH MLS/HR: 2; 1 INJECTION, POWDER, FOR SOLUTION INTRAMUSCULAR; INTRAVENOUS at 10:29

## 2019-04-28 RX ADMIN — ENOXAPARIN SODIUM SCH MG: 40 INJECTION SUBCUTANEOUS at 08:26

## 2019-04-28 RX ADMIN — CARVEDILOL SCH MG: 3.12 TABLET, FILM COATED ORAL at 17:41

## 2019-04-28 RX ADMIN — CARVEDILOL SCH MG: 3.12 TABLET, FILM COATED ORAL at 07:24

## 2019-04-28 RX ADMIN — VANCOMYCIN HYDROCHLORIDE SCH MLS/HR: 1 INJECTION, POWDER, LYOPHILIZED, FOR SOLUTION INTRAVENOUS at 01:36

## 2019-04-28 RX ADMIN — NICOTINE SCH PATCH: 14 PATCH, EXTENDED RELEASE TRANSDERMAL at 21:15

## 2019-04-28 RX ADMIN — VANCOMYCIN HYDROCHLORIDE SCH MLS/HR: 1 INJECTION, POWDER, LYOPHILIZED, FOR SOLUTION INTRAVENOUS at 19:33

## 2019-04-29 VITALS — DIASTOLIC BLOOD PRESSURE: 70 MMHG | SYSTOLIC BLOOD PRESSURE: 128 MMHG

## 2019-04-29 VITALS — SYSTOLIC BLOOD PRESSURE: 120 MMHG | DIASTOLIC BLOOD PRESSURE: 84 MMHG

## 2019-04-29 VITALS — SYSTOLIC BLOOD PRESSURE: 124 MMHG | DIASTOLIC BLOOD PRESSURE: 78 MMHG

## 2019-04-29 VITALS — DIASTOLIC BLOOD PRESSURE: 68 MMHG | SYSTOLIC BLOOD PRESSURE: 122 MMHG

## 2019-04-29 RX ADMIN — AMPICILLIN SODIUM AND SULBACTAM SODIUM SCH MLS/HR: 2; 1 INJECTION, POWDER, FOR SOLUTION INTRAMUSCULAR; INTRAVENOUS at 09:51

## 2019-04-29 RX ADMIN — OLANZAPINE SCH MG: 2.5 TABLET, FILM COATED ORAL at 09:52

## 2019-04-29 RX ADMIN — ENOXAPARIN SODIUM SCH MG: 40 INJECTION SUBCUTANEOUS at 09:52

## 2019-04-29 RX ADMIN — FERROUS SULFATE TAB 325 MG (65 MG ELEMENTAL FE) SCH MG: 325 (65 FE) TAB at 13:42

## 2019-04-29 RX ADMIN — OLANZAPINE SCH MG: 2.5 TABLET, FILM COATED ORAL at 15:14

## 2019-04-29 RX ADMIN — CARVEDILOL SCH MG: 3.12 TABLET, FILM COATED ORAL at 05:39

## 2019-04-29 RX ADMIN — OLANZAPINE SCH MG: 2.5 TABLET, FILM COATED ORAL at 21:53

## 2019-04-29 RX ADMIN — ONDANSETRON PRN MG: 4 TABLET, ORALLY DISINTEGRATING ORAL at 18:13

## 2019-04-29 RX ADMIN — VANCOMYCIN HYDROCHLORIDE SCH MLS/HR: 1 INJECTION, POWDER, LYOPHILIZED, FOR SOLUTION INTRAVENOUS at 13:42

## 2019-04-29 RX ADMIN — NICOTINE SCH PATCH: 14 PATCH, EXTENDED RELEASE TRANSDERMAL at 21:30

## 2019-04-29 RX ADMIN — AMPICILLIN SODIUM AND SULBACTAM SODIUM SCH MLS/HR: 2; 1 INJECTION, POWDER, FOR SOLUTION INTRAMUSCULAR; INTRAVENOUS at 04:41

## 2019-04-29 RX ADMIN — AMPICILLIN SODIUM AND SULBACTAM SODIUM SCH MLS/HR: 2; 1 INJECTION, POWDER, FOR SOLUTION INTRAMUSCULAR; INTRAVENOUS at 21:54

## 2019-04-29 RX ADMIN — DIPHENHYDRAMINE HYDROCHLORIDE PRN MG: 25 CAPSULE ORAL at 21:53

## 2019-04-29 RX ADMIN — CARVEDILOL SCH MG: 3.12 TABLET, FILM COATED ORAL at 17:46

## 2019-04-29 RX ADMIN — AMPICILLIN SODIUM AND SULBACTAM SODIUM SCH MLS/HR: 2; 1 INJECTION, POWDER, FOR SOLUTION INTRAMUSCULAR; INTRAVENOUS at 15:14

## 2019-04-30 VITALS — DIASTOLIC BLOOD PRESSURE: 72 MMHG | SYSTOLIC BLOOD PRESSURE: 113 MMHG

## 2019-04-30 VITALS — DIASTOLIC BLOOD PRESSURE: 86 MMHG | SYSTOLIC BLOOD PRESSURE: 129 MMHG

## 2019-04-30 VITALS — DIASTOLIC BLOOD PRESSURE: 68 MMHG | SYSTOLIC BLOOD PRESSURE: 112 MMHG

## 2019-04-30 VITALS — DIASTOLIC BLOOD PRESSURE: 52 MMHG | SYSTOLIC BLOOD PRESSURE: 140 MMHG

## 2019-04-30 RX ADMIN — AMPICILLIN SODIUM AND SULBACTAM SODIUM SCH MLS/HR: 2; 1 INJECTION, POWDER, FOR SOLUTION INTRAMUSCULAR; INTRAVENOUS at 16:26

## 2019-04-30 RX ADMIN — AMPICILLIN SODIUM AND SULBACTAM SODIUM SCH MLS/HR: 2; 1 INJECTION, POWDER, FOR SOLUTION INTRAMUSCULAR; INTRAVENOUS at 10:11

## 2019-04-30 RX ADMIN — ENOXAPARIN SODIUM SCH MG: 40 INJECTION SUBCUTANEOUS at 07:31

## 2019-04-30 RX ADMIN — NICOTINE SCH PATCH: 14 PATCH, EXTENDED RELEASE TRANSDERMAL at 21:16

## 2019-04-30 RX ADMIN — OLANZAPINE SCH MG: 2.5 TABLET, FILM COATED ORAL at 21:15

## 2019-04-30 RX ADMIN — OLANZAPINE SCH MG: 2.5 TABLET, FILM COATED ORAL at 07:31

## 2019-04-30 RX ADMIN — CARVEDILOL SCH MG: 3.12 TABLET, FILM COATED ORAL at 17:49

## 2019-04-30 RX ADMIN — AMPICILLIN SODIUM AND SULBACTAM SODIUM SCH MLS/HR: 2; 1 INJECTION, POWDER, FOR SOLUTION INTRAMUSCULAR; INTRAVENOUS at 22:01

## 2019-04-30 RX ADMIN — AMPICILLIN SODIUM AND SULBACTAM SODIUM SCH MLS/HR: 2; 1 INJECTION, POWDER, FOR SOLUTION INTRAMUSCULAR; INTRAVENOUS at 04:09

## 2019-04-30 RX ADMIN — VANCOMYCIN HYDROCHLORIDE SCH MLS/HR: 1 INJECTION, POWDER, LYOPHILIZED, FOR SOLUTION INTRAVENOUS at 07:38

## 2019-04-30 RX ADMIN — OLANZAPINE SCH MG: 2.5 TABLET, FILM COATED ORAL at 16:26

## 2019-04-30 RX ADMIN — CARVEDILOL SCH MG: 3.12 TABLET, FILM COATED ORAL at 07:00

## 2019-05-01 VITALS — DIASTOLIC BLOOD PRESSURE: 62 MMHG | SYSTOLIC BLOOD PRESSURE: 136 MMHG

## 2019-05-01 VITALS — SYSTOLIC BLOOD PRESSURE: 112 MMHG | DIASTOLIC BLOOD PRESSURE: 72 MMHG

## 2019-05-01 VITALS — SYSTOLIC BLOOD PRESSURE: 132 MMHG | DIASTOLIC BLOOD PRESSURE: 81 MMHG

## 2019-05-01 VITALS — SYSTOLIC BLOOD PRESSURE: 130 MMHG | DIASTOLIC BLOOD PRESSURE: 79 MMHG

## 2019-05-01 LAB — CREAT SERPL-MCNC: 0.97 MG/DL (ref 0.7–1.3)

## 2019-05-01 RX ADMIN — NICOTINE SCH PATCH: 14 PATCH, EXTENDED RELEASE TRANSDERMAL at 21:18

## 2019-05-01 RX ADMIN — AMPICILLIN SODIUM AND SULBACTAM SODIUM SCH MLS/HR: 2; 1 INJECTION, POWDER, FOR SOLUTION INTRAMUSCULAR; INTRAVENOUS at 15:39

## 2019-05-01 RX ADMIN — OLANZAPINE SCH MG: 2.5 TABLET, FILM COATED ORAL at 15:39

## 2019-05-01 RX ADMIN — OLANZAPINE SCH MG: 2.5 TABLET, FILM COATED ORAL at 08:10

## 2019-05-01 RX ADMIN — VANCOMYCIN HYDROCHLORIDE SCH MLS/HR: 1 INJECTION, POWDER, LYOPHILIZED, FOR SOLUTION INTRAVENOUS at 19:23

## 2019-05-01 RX ADMIN — CARVEDILOL SCH MG: 3.12 TABLET, FILM COATED ORAL at 05:09

## 2019-05-01 RX ADMIN — VANCOMYCIN HYDROCHLORIDE SCH MLS/HR: 1 INJECTION, POWDER, LYOPHILIZED, FOR SOLUTION INTRAVENOUS at 01:42

## 2019-05-01 RX ADMIN — FERROUS SULFATE TAB 325 MG (65 MG ELEMENTAL FE) SCH MG: 325 (65 FE) TAB at 13:21

## 2019-05-01 RX ADMIN — CARVEDILOL SCH MG: 3.12 TABLET, FILM COATED ORAL at 17:34

## 2019-05-01 RX ADMIN — AMPICILLIN SODIUM AND SULBACTAM SODIUM SCH MLS/HR: 2; 1 INJECTION, POWDER, FOR SOLUTION INTRAMUSCULAR; INTRAVENOUS at 22:19

## 2019-05-01 RX ADMIN — AMPICILLIN SODIUM AND SULBACTAM SODIUM SCH MLS/HR: 2; 1 INJECTION, POWDER, FOR SOLUTION INTRAMUSCULAR; INTRAVENOUS at 04:17

## 2019-05-01 RX ADMIN — OLANZAPINE SCH MG: 2.5 TABLET, FILM COATED ORAL at 21:17

## 2019-05-01 RX ADMIN — ENOXAPARIN SODIUM SCH MG: 40 INJECTION SUBCUTANEOUS at 08:10

## 2019-05-01 RX ADMIN — AMPICILLIN SODIUM AND SULBACTAM SODIUM SCH MLS/HR: 2; 1 INJECTION, POWDER, FOR SOLUTION INTRAMUSCULAR; INTRAVENOUS at 09:59

## 2019-05-02 VITALS — DIASTOLIC BLOOD PRESSURE: 56 MMHG | SYSTOLIC BLOOD PRESSURE: 98 MMHG

## 2019-05-02 VITALS — SYSTOLIC BLOOD PRESSURE: 100 MMHG | DIASTOLIC BLOOD PRESSURE: 63 MMHG

## 2019-05-02 VITALS — SYSTOLIC BLOOD PRESSURE: 98 MMHG | DIASTOLIC BLOOD PRESSURE: 58 MMHG

## 2019-05-02 RX ADMIN — AMPICILLIN SODIUM AND SULBACTAM SODIUM SCH MLS/HR: 2; 1 INJECTION, POWDER, FOR SOLUTION INTRAMUSCULAR; INTRAVENOUS at 16:12

## 2019-05-02 RX ADMIN — OLANZAPINE SCH MG: 2.5 TABLET, FILM COATED ORAL at 16:12

## 2019-05-02 RX ADMIN — VANCOMYCIN HYDROCHLORIDE SCH MLS/HR: 1 INJECTION, POWDER, LYOPHILIZED, FOR SOLUTION INTRAVENOUS at 13:18

## 2019-05-02 RX ADMIN — CARVEDILOL SCH MG: 3.12 TABLET, FILM COATED ORAL at 04:08

## 2019-05-02 RX ADMIN — AMPICILLIN SODIUM AND SULBACTAM SODIUM SCH MLS/HR: 2; 1 INJECTION, POWDER, FOR SOLUTION INTRAMUSCULAR; INTRAVENOUS at 04:08

## 2019-05-02 RX ADMIN — CARVEDILOL SCH MG: 3.12 TABLET, FILM COATED ORAL at 18:00

## 2019-05-02 RX ADMIN — OLANZAPINE SCH MG: 2.5 TABLET, FILM COATED ORAL at 09:03

## 2019-05-02 RX ADMIN — OLANZAPINE SCH MG: 2.5 TABLET, FILM COATED ORAL at 20:28

## 2019-05-02 RX ADMIN — ENOXAPARIN SODIUM SCH MG: 40 INJECTION SUBCUTANEOUS at 09:03

## 2019-05-02 RX ADMIN — NICOTINE SCH PATCH: 14 PATCH, EXTENDED RELEASE TRANSDERMAL at 20:29

## 2019-05-02 RX ADMIN — AMPICILLIN SODIUM AND SULBACTAM SODIUM SCH MLS/HR: 2; 1 INJECTION, POWDER, FOR SOLUTION INTRAMUSCULAR; INTRAVENOUS at 09:53

## 2019-05-02 RX ADMIN — AMPICILLIN SODIUM AND SULBACTAM SODIUM SCH MLS/HR: 2; 1 INJECTION, POWDER, FOR SOLUTION INTRAMUSCULAR; INTRAVENOUS at 21:35

## 2019-05-03 VITALS — SYSTOLIC BLOOD PRESSURE: 128 MMHG | DIASTOLIC BLOOD PRESSURE: 85 MMHG

## 2019-05-03 VITALS — SYSTOLIC BLOOD PRESSURE: 114 MMHG | DIASTOLIC BLOOD PRESSURE: 73 MMHG

## 2019-05-03 VITALS — DIASTOLIC BLOOD PRESSURE: 88 MMHG | SYSTOLIC BLOOD PRESSURE: 125 MMHG

## 2019-05-03 RX ADMIN — FERROUS SULFATE TAB 325 MG (65 MG ELEMENTAL FE) SCH MG: 325 (65 FE) TAB at 15:16

## 2019-05-03 RX ADMIN — AMPICILLIN SODIUM AND SULBACTAM SODIUM SCH MLS/HR: 2; 1 INJECTION, POWDER, FOR SOLUTION INTRAMUSCULAR; INTRAVENOUS at 16:36

## 2019-05-03 RX ADMIN — CARVEDILOL SCH MG: 3.12 TABLET, FILM COATED ORAL at 18:00

## 2019-05-03 RX ADMIN — VANCOMYCIN HYDROCHLORIDE SCH MLS/HR: 1 INJECTION, POWDER, LYOPHILIZED, FOR SOLUTION INTRAVENOUS at 08:17

## 2019-05-03 RX ADMIN — OLANZAPINE SCH MG: 2.5 TABLET, FILM COATED ORAL at 08:16

## 2019-05-03 RX ADMIN — AMPICILLIN SODIUM AND SULBACTAM SODIUM SCH MLS/HR: 2; 1 INJECTION, POWDER, FOR SOLUTION INTRAMUSCULAR; INTRAVENOUS at 21:54

## 2019-05-03 RX ADMIN — AMPICILLIN SODIUM AND SULBACTAM SODIUM SCH MLS/HR: 2; 1 INJECTION, POWDER, FOR SOLUTION INTRAMUSCULAR; INTRAVENOUS at 09:58

## 2019-05-03 RX ADMIN — NICOTINE SCH PATCH: 14 PATCH, EXTENDED RELEASE TRANSDERMAL at 19:55

## 2019-05-03 RX ADMIN — AMPICILLIN SODIUM AND SULBACTAM SODIUM SCH MLS/HR: 2; 1 INJECTION, POWDER, FOR SOLUTION INTRAMUSCULAR; INTRAVENOUS at 04:02

## 2019-05-03 RX ADMIN — OLANZAPINE SCH MG: 2.5 TABLET, FILM COATED ORAL at 19:54

## 2019-05-03 RX ADMIN — ENOXAPARIN SODIUM SCH MG: 40 INJECTION SUBCUTANEOUS at 08:16

## 2019-05-03 RX ADMIN — CARVEDILOL SCH MG: 3.12 TABLET, FILM COATED ORAL at 04:45

## 2019-05-03 RX ADMIN — OLANZAPINE SCH MG: 2.5 TABLET, FILM COATED ORAL at 16:35

## 2019-05-04 VITALS — DIASTOLIC BLOOD PRESSURE: 73 MMHG | SYSTOLIC BLOOD PRESSURE: 118 MMHG

## 2019-05-04 VITALS — DIASTOLIC BLOOD PRESSURE: 70 MMHG | SYSTOLIC BLOOD PRESSURE: 111 MMHG

## 2019-05-04 VITALS — DIASTOLIC BLOOD PRESSURE: 72 MMHG | SYSTOLIC BLOOD PRESSURE: 113 MMHG

## 2019-05-04 VITALS — DIASTOLIC BLOOD PRESSURE: 69 MMHG | SYSTOLIC BLOOD PRESSURE: 116 MMHG

## 2019-05-04 LAB — CREAT SERPL-MCNC: 1.02 MG/DL (ref 0.7–1.3)

## 2019-05-04 RX ADMIN — ENOXAPARIN SODIUM SCH MG: 40 INJECTION SUBCUTANEOUS at 09:37

## 2019-05-04 RX ADMIN — OLANZAPINE SCH MG: 2.5 TABLET, FILM COATED ORAL at 09:37

## 2019-05-04 RX ADMIN — OLANZAPINE SCH MG: 2.5 TABLET, FILM COATED ORAL at 16:49

## 2019-05-04 RX ADMIN — AMPICILLIN SODIUM AND SULBACTAM SODIUM SCH MLS/HR: 2; 1 INJECTION, POWDER, FOR SOLUTION INTRAMUSCULAR; INTRAVENOUS at 09:38

## 2019-05-04 RX ADMIN — NICOTINE SCH PATCH: 14 PATCH, EXTENDED RELEASE TRANSDERMAL at 21:30

## 2019-05-04 RX ADMIN — AMPICILLIN SODIUM AND SULBACTAM SODIUM SCH MLS/HR: 2; 1 INJECTION, POWDER, FOR SOLUTION INTRAMUSCULAR; INTRAVENOUS at 22:14

## 2019-05-04 RX ADMIN — CARVEDILOL SCH MG: 3.12 TABLET, FILM COATED ORAL at 19:40

## 2019-05-04 RX ADMIN — AMPICILLIN SODIUM AND SULBACTAM SODIUM SCH MLS/HR: 2; 1 INJECTION, POWDER, FOR SOLUTION INTRAMUSCULAR; INTRAVENOUS at 04:25

## 2019-05-04 RX ADMIN — AMPICILLIN SODIUM AND SULBACTAM SODIUM SCH MLS/HR: 2; 1 INJECTION, POWDER, FOR SOLUTION INTRAMUSCULAR; INTRAVENOUS at 16:46

## 2019-05-04 RX ADMIN — OLANZAPINE SCH MG: 2.5 TABLET, FILM COATED ORAL at 22:13

## 2019-05-04 RX ADMIN — CARVEDILOL SCH MG: 3.12 TABLET, FILM COATED ORAL at 05:08

## 2019-05-05 VITALS — DIASTOLIC BLOOD PRESSURE: 63 MMHG | SYSTOLIC BLOOD PRESSURE: 129 MMHG

## 2019-05-05 VITALS — SYSTOLIC BLOOD PRESSURE: 128 MMHG | DIASTOLIC BLOOD PRESSURE: 64 MMHG

## 2019-05-05 VITALS — SYSTOLIC BLOOD PRESSURE: 124 MMHG | DIASTOLIC BLOOD PRESSURE: 68 MMHG

## 2019-05-05 VITALS — SYSTOLIC BLOOD PRESSURE: 115 MMHG | DIASTOLIC BLOOD PRESSURE: 78 MMHG

## 2019-05-05 VITALS — DIASTOLIC BLOOD PRESSURE: 72 MMHG | SYSTOLIC BLOOD PRESSURE: 107 MMHG

## 2019-05-05 LAB
ALBUMIN SERPL-MCNC: 3.4 G/DL (ref 3.4–5)
ALP SERPL-CCNC: 216 U/L (ref 45–117)
ALT SERPL-CCNC: 109 U/L (ref 12–78)
ANION GAP SERPL CALC-SCNC: 6 MMOL/L (ref 5–15)
BASOPHILS # BLD AUTO: 0.04 X10^3/UL (ref 0–0.1)
BASOPHILS NFR BLD AUTO: 1 % (ref 0–1)
BILIRUB SERPL-MCNC: 0.3 MG/DL (ref 0.2–1)
CALCIUM SERPL-MCNC: 9.1 MG/DL (ref 8.5–10.1)
CHLORIDE SERPL-SCNC: 106 MMOL/L (ref 98–107)
CREAT SERPL-MCNC: 0.96 MG/DL (ref 0.7–1.3)
CRP SERPL-MCNC: 3.2 MG/DL (ref 0.02–0.49)
EOSINOPHIL # BLD AUTO: 0.26 X10^3/UL (ref 0–0.4)
EOSINOPHIL NFR BLD AUTO: 4 % (ref 1–7)
ERYTHROCYTE [DISTWIDTH] IN BLOOD BY AUTOMATED COUNT: 18 % (ref 9.4–14.8)
ERYTHROCYTE [SEDIMENTATION RATE] IN BLOOD BY WESTERGREN METHOD: 78 MM/HR (ref 0–10)
HCT (SEDRATE): 35.6 % (ref 39.2–51.8)
LYMPHOCYTES # BLD AUTO: 1.41 X10^3/UL (ref 1–3.4)
LYMPHOCYTES NFR BLD AUTO: 20 % (ref 22–44)
MCH RBC QN AUTO: 25.7 PG (ref 27.5–34.5)
MCHC RBC AUTO-ENTMCNC: 31.9 G/DL (ref 33.2–36.2)
MCV RBC AUTO: 80.8 FL (ref 81–97)
MD: NO
MONOCYTES # BLD AUTO: 1.16 X10^3/UL (ref 0.2–0.8)
MONOCYTES NFR BLD AUTO: 17 % (ref 2–9)
NEUTROPHILS # BLD AUTO: 4.08 X10^3/UL (ref 1.8–6.8)
NEUTROPHILS NFR BLD AUTO: 59 % (ref 42–75)
PLATELET # BLD AUTO: 508 X10^3/UL (ref 130–400)
PMV BLD AUTO: 7.2 FL (ref 7.4–10.4)
PROT SERPL-MCNC: 8.1 G/DL (ref 6.4–8.2)
RBC # BLD AUTO: 4.39 X10^6/UL (ref 4.38–5.82)

## 2019-05-05 RX ADMIN — FERROUS SULFATE TAB 325 MG (65 MG ELEMENTAL FE) SCH MG: 325 (65 FE) TAB at 16:27

## 2019-05-05 RX ADMIN — AMPICILLIN SODIUM AND SULBACTAM SODIUM SCH MLS/HR: 2; 1 INJECTION, POWDER, FOR SOLUTION INTRAMUSCULAR; INTRAVENOUS at 10:01

## 2019-05-05 RX ADMIN — AMPICILLIN SODIUM AND SULBACTAM SODIUM SCH MLS/HR: 2; 1 INJECTION, POWDER, FOR SOLUTION INTRAMUSCULAR; INTRAVENOUS at 04:18

## 2019-05-05 RX ADMIN — CARVEDILOL SCH MG: 3.12 TABLET, FILM COATED ORAL at 18:42

## 2019-05-05 RX ADMIN — NICOTINE SCH PATCH: 14 PATCH, EXTENDED RELEASE TRANSDERMAL at 21:01

## 2019-05-05 RX ADMIN — CARVEDILOL SCH MG: 3.12 TABLET, FILM COATED ORAL at 07:17

## 2019-05-05 RX ADMIN — ENOXAPARIN SODIUM SCH MG: 40 INJECTION SUBCUTANEOUS at 10:01

## 2019-05-05 RX ADMIN — OLANZAPINE SCH MG: 2.5 TABLET, FILM COATED ORAL at 10:01

## 2019-05-06 VITALS — DIASTOLIC BLOOD PRESSURE: 89 MMHG | SYSTOLIC BLOOD PRESSURE: 116 MMHG

## 2019-05-06 VITALS — DIASTOLIC BLOOD PRESSURE: 83 MMHG | SYSTOLIC BLOOD PRESSURE: 153 MMHG

## 2019-05-06 VITALS — SYSTOLIC BLOOD PRESSURE: 136 MMHG | DIASTOLIC BLOOD PRESSURE: 65 MMHG

## 2019-05-06 LAB
ALBUMIN SERPL-MCNC: 3.2 G/DL (ref 3.4–5)
ALP SERPL-CCNC: 192 U/L (ref 45–117)
ALT SERPL-CCNC: 86 U/L (ref 12–78)
ANION GAP SERPL CALC-SCNC: 7 MMOL/L (ref 5–15)
BASOPHILS # BLD AUTO: 0.03 X10^3/UL (ref 0–0.1)
BASOPHILS NFR BLD AUTO: 1 % (ref 0–1)
BILIRUB SERPL-MCNC: 0.1 MG/DL (ref 0.2–1)
CALCIUM SERPL-MCNC: 9.2 MG/DL (ref 8.5–10.1)
CHLORIDE SERPL-SCNC: 107 MMOL/L (ref 98–107)
CREAT SERPL-MCNC: 1.01 MG/DL (ref 0.7–1.3)
CRP SERPL-MCNC: 1.9 MG/DL (ref 0.02–0.49)
EOSINOPHIL # BLD AUTO: 0.13 X10^3/UL (ref 0–0.4)
EOSINOPHIL NFR BLD AUTO: 2 % (ref 1–7)
ERYTHROCYTE [DISTWIDTH] IN BLOOD BY AUTOMATED COUNT: 17.8 % (ref 9.4–14.8)
ERYTHROCYTE [SEDIMENTATION RATE] IN BLOOD BY WESTERGREN METHOD: 64 MM/HR (ref 0–10)
HCT (SEDRATE): 34.1 % (ref 39.2–51.8)
LYMPHOCYTES # BLD AUTO: 1.17 X10^3/UL (ref 1–3.4)
LYMPHOCYTES NFR BLD AUTO: 20 % (ref 22–44)
MCH RBC QN AUTO: 26.3 PG (ref 27.5–34.5)
MCHC RBC AUTO-ENTMCNC: 32.5 G/DL (ref 33.2–36.2)
MCV RBC AUTO: 80.9 FL (ref 81–97)
MD: NO
MONOCYTES # BLD AUTO: 0.76 X10^3/UL (ref 0.2–0.8)
MONOCYTES NFR BLD AUTO: 13 % (ref 2–9)
NEUTROPHILS # BLD AUTO: 3.81 X10^3/UL (ref 1.8–6.8)
NEUTROPHILS NFR BLD AUTO: 65 % (ref 42–75)
PLATELET # BLD AUTO: 499 X10^3/UL (ref 130–400)
PMV BLD AUTO: 7.2 FL (ref 7.4–10.4)
PROT SERPL-MCNC: 7.8 G/DL (ref 6.4–8.2)
RBC # BLD AUTO: 4.28 X10^6/UL (ref 4.38–5.82)

## 2019-05-06 RX ADMIN — CARVEDILOL SCH MG: 3.12 TABLET, FILM COATED ORAL at 06:36

## 2019-05-06 RX ADMIN — CARVEDILOL SCH MG: 3.12 TABLET, FILM COATED ORAL at 18:03

## 2019-05-06 RX ADMIN — NICOTINE SCH PATCH: 14 PATCH, EXTENDED RELEASE TRANSDERMAL at 21:09

## 2019-05-06 RX ADMIN — ENOXAPARIN SODIUM SCH MG: 40 INJECTION SUBCUTANEOUS at 09:00

## 2019-05-07 VITALS — DIASTOLIC BLOOD PRESSURE: 76 MMHG | SYSTOLIC BLOOD PRESSURE: 128 MMHG

## 2019-05-07 VITALS — SYSTOLIC BLOOD PRESSURE: 142 MMHG | DIASTOLIC BLOOD PRESSURE: 76 MMHG

## 2019-05-07 VITALS — DIASTOLIC BLOOD PRESSURE: 74 MMHG | SYSTOLIC BLOOD PRESSURE: 132 MMHG

## 2019-05-07 VITALS — SYSTOLIC BLOOD PRESSURE: 125 MMHG | DIASTOLIC BLOOD PRESSURE: 85 MMHG

## 2019-05-07 LAB — CREAT SERPL-MCNC: 0.89 MG/DL (ref 0.7–1.3)

## 2019-05-07 RX ADMIN — CARVEDILOL SCH MG: 3.12 TABLET, FILM COATED ORAL at 16:40

## 2019-05-07 RX ADMIN — CARVEDILOL SCH MG: 3.12 TABLET, FILM COATED ORAL at 05:02

## 2019-05-07 RX ADMIN — FERROUS SULFATE TAB 325 MG (65 MG ELEMENTAL FE) SCH MG: 325 (65 FE) TAB at 16:40

## 2019-05-07 RX ADMIN — NICOTINE SCH PATCH: 14 PATCH, EXTENDED RELEASE TRANSDERMAL at 21:30

## 2019-05-07 RX ADMIN — ENOXAPARIN SODIUM SCH MG: 40 INJECTION SUBCUTANEOUS at 08:04

## 2019-05-08 VITALS — DIASTOLIC BLOOD PRESSURE: 62 MMHG | SYSTOLIC BLOOD PRESSURE: 108 MMHG

## 2019-05-08 VITALS — DIASTOLIC BLOOD PRESSURE: 71 MMHG | SYSTOLIC BLOOD PRESSURE: 115 MMHG

## 2019-05-08 VITALS — DIASTOLIC BLOOD PRESSURE: 80 MMHG | SYSTOLIC BLOOD PRESSURE: 120 MMHG

## 2019-05-08 VITALS — DIASTOLIC BLOOD PRESSURE: 84 MMHG | SYSTOLIC BLOOD PRESSURE: 126 MMHG

## 2019-05-08 VITALS — DIASTOLIC BLOOD PRESSURE: 92 MMHG | SYSTOLIC BLOOD PRESSURE: 144 MMHG

## 2019-05-08 RX ADMIN — CARVEDILOL SCH MG: 3.12 TABLET, FILM COATED ORAL at 06:00

## 2019-05-08 RX ADMIN — CARVEDILOL SCH MG: 3.12 TABLET, FILM COATED ORAL at 18:05

## 2019-05-08 RX ADMIN — HALOPERIDOL LACTATE PRN MG: 5 INJECTION, SOLUTION INTRAMUSCULAR at 16:11

## 2019-05-08 RX ADMIN — ENOXAPARIN SODIUM SCH MG: 40 INJECTION SUBCUTANEOUS at 12:33

## 2019-05-08 RX ADMIN — NICOTINE SCH PATCH: 14 PATCH, EXTENDED RELEASE TRANSDERMAL at 21:30

## 2019-05-09 VITALS — SYSTOLIC BLOOD PRESSURE: 125 MMHG | DIASTOLIC BLOOD PRESSURE: 80 MMHG

## 2019-05-09 VITALS — SYSTOLIC BLOOD PRESSURE: 122 MMHG | DIASTOLIC BLOOD PRESSURE: 80 MMHG

## 2019-05-09 VITALS — SYSTOLIC BLOOD PRESSURE: 120 MMHG | DIASTOLIC BLOOD PRESSURE: 71 MMHG

## 2019-05-09 VITALS — SYSTOLIC BLOOD PRESSURE: 136 MMHG | DIASTOLIC BLOOD PRESSURE: 77 MMHG

## 2019-05-09 RX ADMIN — FERROUS SULFATE TAB 325 MG (65 MG ELEMENTAL FE) SCH MG: 325 (65 FE) TAB at 17:32

## 2019-05-09 RX ADMIN — ENOXAPARIN SODIUM SCH MG: 40 INJECTION SUBCUTANEOUS at 09:50

## 2019-05-09 RX ADMIN — CARVEDILOL SCH MG: 3.12 TABLET, FILM COATED ORAL at 17:32

## 2019-05-09 RX ADMIN — CARVEDILOL SCH MG: 3.12 TABLET, FILM COATED ORAL at 06:40

## 2019-05-09 RX ADMIN — NICOTINE SCH PATCH: 14 PATCH, EXTENDED RELEASE TRANSDERMAL at 21:30

## 2019-05-09 RX ADMIN — HALOPERIDOL LACTATE PRN MG: 5 INJECTION, SOLUTION INTRAMUSCULAR at 19:52

## 2019-05-10 ENCOUNTER — HOSPITAL ENCOUNTER (EMERGENCY)
Dept: HOSPITAL 8 - ED | Age: 64
Discharge: HOME | End: 2019-05-10
Payer: MEDICAID

## 2019-05-10 VITALS — DIASTOLIC BLOOD PRESSURE: 90 MMHG | SYSTOLIC BLOOD PRESSURE: 136 MMHG

## 2019-05-10 VITALS — DIASTOLIC BLOOD PRESSURE: 82 MMHG | SYSTOLIC BLOOD PRESSURE: 138 MMHG

## 2019-05-10 VITALS — BODY MASS INDEX: 20.99 KG/M2 | WEIGHT: 149.91 LBS | HEIGHT: 71 IN

## 2019-05-10 VITALS — DIASTOLIC BLOOD PRESSURE: 71 MMHG | SYSTOLIC BLOOD PRESSURE: 114 MMHG

## 2019-05-10 DIAGNOSIS — I10: ICD-10-CM

## 2019-05-10 DIAGNOSIS — K21.9: ICD-10-CM

## 2019-05-10 DIAGNOSIS — E11.9: ICD-10-CM

## 2019-05-10 DIAGNOSIS — L90.5: Primary | ICD-10-CM

## 2019-05-10 DIAGNOSIS — J44.9: ICD-10-CM

## 2019-05-10 LAB — CREAT SERPL-MCNC: 0.99 MG/DL (ref 0.7–1.3)

## 2019-05-10 PROCEDURE — 99283 EMERGENCY DEPT VISIT LOW MDM: CPT

## 2019-05-10 RX ADMIN — CARVEDILOL SCH MG: 3.12 TABLET, FILM COATED ORAL at 04:55

## 2019-05-18 ENCOUNTER — HOSPITAL ENCOUNTER (INPATIENT)
Dept: HOSPITAL 8 - ED | Age: 64
LOS: 2 days | Discharge: LEFT BEFORE DISCHARGE | DRG: 871 | End: 2019-05-20
Attending: INTERNAL MEDICINE | Admitting: INTERNAL MEDICINE
Payer: MEDICAID

## 2019-05-18 VITALS — SYSTOLIC BLOOD PRESSURE: 138 MMHG | DIASTOLIC BLOOD PRESSURE: 78 MMHG

## 2019-05-18 VITALS — HEIGHT: 70 IN | BODY MASS INDEX: 22 KG/M2 | WEIGHT: 153.66 LBS

## 2019-05-18 VITALS — DIASTOLIC BLOOD PRESSURE: 78 MMHG | SYSTOLIC BLOOD PRESSURE: 133 MMHG

## 2019-05-18 VITALS — SYSTOLIC BLOOD PRESSURE: 116 MMHG | DIASTOLIC BLOOD PRESSURE: 76 MMHG

## 2019-05-18 VITALS — SYSTOLIC BLOOD PRESSURE: 116 MMHG | DIASTOLIC BLOOD PRESSURE: 66 MMHG

## 2019-05-18 DIAGNOSIS — Z59.0: ICD-10-CM

## 2019-05-18 DIAGNOSIS — Z53.21: ICD-10-CM

## 2019-05-18 DIAGNOSIS — E83.42: ICD-10-CM

## 2019-05-18 DIAGNOSIS — K21.9: ICD-10-CM

## 2019-05-18 DIAGNOSIS — E11.9: ICD-10-CM

## 2019-05-18 DIAGNOSIS — D50.9: ICD-10-CM

## 2019-05-18 DIAGNOSIS — R65.21: ICD-10-CM

## 2019-05-18 DIAGNOSIS — J44.9: ICD-10-CM

## 2019-05-18 DIAGNOSIS — Z91.19: ICD-10-CM

## 2019-05-18 DIAGNOSIS — L03.116: ICD-10-CM

## 2019-05-18 DIAGNOSIS — Z86.14: ICD-10-CM

## 2019-05-18 DIAGNOSIS — A41.9: Primary | ICD-10-CM

## 2019-05-18 DIAGNOSIS — I10: ICD-10-CM

## 2019-05-18 DIAGNOSIS — F17.200: ICD-10-CM

## 2019-05-18 DIAGNOSIS — F10.20: ICD-10-CM

## 2019-05-18 LAB
ALBUMIN SERPL-MCNC: 3 G/DL (ref 3.4–5)
ANION GAP SERPL CALC-SCNC: 9 MMOL/L (ref 5–15)
BASOPHILS # BLD AUTO: 0.03 X10^3/UL (ref 0–0.1)
BASOPHILS NFR BLD AUTO: 0 % (ref 0–1)
CALCIUM SERPL-MCNC: 8.2 MG/DL (ref 8.5–10.1)
CHLORIDE SERPL-SCNC: 107 MMOL/L (ref 98–107)
CREAT SERPL-MCNC: 0.74 MG/DL (ref 0.7–1.3)
EOSINOPHIL # BLD AUTO: 0.07 X10^3/UL (ref 0–0.4)
EOSINOPHIL NFR BLD AUTO: 1 % (ref 1–7)
ERYTHROCYTE [DISTWIDTH] IN BLOOD BY AUTOMATED COUNT: 18.7 % (ref 9.4–14.8)
LYMPHOCYTES # BLD AUTO: 0.99 X10^3/UL (ref 1–3.4)
LYMPHOCYTES NFR BLD AUTO: 9 % (ref 22–44)
MCH RBC QN AUTO: 25.9 PG (ref 27.5–34.5)
MCHC RBC AUTO-ENTMCNC: 31.2 G/DL (ref 33.2–36.2)
MCV RBC AUTO: 82.9 FL (ref 81–97)
MD: (no result)
MONOCYTES # BLD AUTO: 1.11 X10^3/UL (ref 0.2–0.8)
MONOCYTES NFR BLD AUTO: 10 % (ref 2–9)
NEUTROPHILS # BLD AUTO: 9.11 X10^3/UL (ref 1.8–6.8)
NEUTROPHILS NFR BLD AUTO: 81 % (ref 42–75)
PLATELET # BLD AUTO: 396 X10^3/UL (ref 130–400)
PMV BLD AUTO: 7.8 FL (ref 7.4–10.4)
RBC # BLD AUTO: 4.13 X10^6/UL (ref 4.38–5.82)
T4 FREE SERPL-MCNC: 1.2 NG/DL (ref 0.76–1.46)

## 2019-05-18 PROCEDURE — 80053 COMPREHEN METABOLIC PANEL: CPT

## 2019-05-18 PROCEDURE — 80048 BASIC METABOLIC PNL TOTAL CA: CPT

## 2019-05-18 PROCEDURE — 96375 TX/PRO/DX INJ NEW DRUG ADDON: CPT

## 2019-05-18 PROCEDURE — 84145 PROCALCITONIN (PCT): CPT

## 2019-05-18 PROCEDURE — C9113 INJ PANTOPRAZOLE SODIUM, VIA: HCPCS

## 2019-05-18 PROCEDURE — 83735 ASSAY OF MAGNESIUM: CPT

## 2019-05-18 PROCEDURE — 84100 ASSAY OF PHOSPHORUS: CPT

## 2019-05-18 PROCEDURE — 84443 ASSAY THYROID STIM HORMONE: CPT

## 2019-05-18 PROCEDURE — 85651 RBC SED RATE NONAUTOMATED: CPT

## 2019-05-18 PROCEDURE — 87186 SC STD MICRODIL/AGAR DIL: CPT

## 2019-05-18 PROCEDURE — 36415 COLL VENOUS BLD VENIPUNCTURE: CPT

## 2019-05-18 PROCEDURE — 86140 C-REACTIVE PROTEIN: CPT

## 2019-05-18 PROCEDURE — 82040 ASSAY OF SERUM ALBUMIN: CPT

## 2019-05-18 PROCEDURE — 87070 CULTURE OTHR SPECIMN AEROBIC: CPT

## 2019-05-18 PROCEDURE — 87147 CULTURE TYPE IMMUNOLOGIC: CPT

## 2019-05-18 PROCEDURE — 87205 SMEAR GRAM STAIN: CPT

## 2019-05-18 PROCEDURE — 83605 ASSAY OF LACTIC ACID: CPT

## 2019-05-18 PROCEDURE — A9585 GADOBUTROL INJECTION: HCPCS

## 2019-05-18 PROCEDURE — 87040 BLOOD CULTURE FOR BACTERIA: CPT

## 2019-05-18 PROCEDURE — 80307 DRUG TEST PRSMV CHEM ANLYZR: CPT

## 2019-05-18 PROCEDURE — 96365 THER/PROPH/DIAG IV INF INIT: CPT

## 2019-05-18 PROCEDURE — 84439 ASSAY OF FREE THYROXINE: CPT

## 2019-05-18 PROCEDURE — 85025 COMPLETE CBC W/AUTO DIFF WBC: CPT

## 2019-05-18 PROCEDURE — 87077 CULTURE AEROBIC IDENTIFY: CPT

## 2019-05-18 RX ADMIN — AMPICILLIN SODIUM AND SULBACTAM SODIUM SCH MLS/HR: 2; 1 INJECTION, POWDER, FOR SOLUTION INTRAMUSCULAR; INTRAVENOUS at 13:52

## 2019-05-18 RX ADMIN — POTASSIUM CHLORIDE SCH MLS/HR: 2 INJECTION, SOLUTION, CONCENTRATE INTRAVENOUS at 15:16

## 2019-05-18 RX ADMIN — AMPICILLIN SODIUM AND SULBACTAM SODIUM SCH MLS/HR: 2; 1 INJECTION, POWDER, FOR SOLUTION INTRAMUSCULAR; INTRAVENOUS at 21:06

## 2019-05-18 SDOH — ECONOMIC STABILITY - HOUSING INSECURITY: HOMELESSNESS: Z59.0

## 2019-05-19 VITALS — SYSTOLIC BLOOD PRESSURE: 113 MMHG | DIASTOLIC BLOOD PRESSURE: 65 MMHG

## 2019-05-19 VITALS — DIASTOLIC BLOOD PRESSURE: 71 MMHG | SYSTOLIC BLOOD PRESSURE: 145 MMHG

## 2019-05-19 VITALS — DIASTOLIC BLOOD PRESSURE: 77 MMHG | SYSTOLIC BLOOD PRESSURE: 134 MMHG

## 2019-05-19 VITALS — DIASTOLIC BLOOD PRESSURE: 74 MMHG | SYSTOLIC BLOOD PRESSURE: 130 MMHG

## 2019-05-19 VITALS — SYSTOLIC BLOOD PRESSURE: 117 MMHG | DIASTOLIC BLOOD PRESSURE: 69 MMHG

## 2019-05-19 LAB
ALBUMIN SERPL-MCNC: 2.3 G/DL (ref 3.4–5)
ALP SERPL-CCNC: 110 U/L (ref 45–117)
ALT SERPL-CCNC: 29 U/L (ref 12–78)
ANION GAP SERPL CALC-SCNC: 6 MMOL/L (ref 5–15)
BASOPHILS # BLD AUTO: 0.07 X10^3/UL (ref 0–0.1)
BASOPHILS NFR BLD AUTO: 1 % (ref 0–1)
BILIRUB SERPL-MCNC: 0.3 MG/DL (ref 0.2–1)
CALCIUM SERPL-MCNC: 7.9 MG/DL (ref 8.5–10.1)
CHLORIDE SERPL-SCNC: 110 MMOL/L (ref 98–107)
CREAT SERPL-MCNC: 0.68 MG/DL (ref 0.7–1.3)
EOSINOPHIL # BLD AUTO: 0.2 X10^3/UL (ref 0–0.4)
EOSINOPHIL NFR BLD AUTO: 3 % (ref 1–7)
ERYTHROCYTE [DISTWIDTH] IN BLOOD BY AUTOMATED COUNT: 17.7 % (ref 9.4–14.8)
ERYTHROCYTE [SEDIMENTATION RATE] IN BLOOD BY WESTERGREN METHOD: 56 MM/HR (ref 0–10)
HCT (SEDRATE): 30.9 % (ref 39.2–51.8)
LYMPHOCYTES # BLD AUTO: 0.96 X10^3/UL (ref 1–3.4)
LYMPHOCYTES NFR BLD AUTO: 13 % (ref 22–44)
MCH RBC QN AUTO: 26.5 PG (ref 27.5–34.5)
MCHC RBC AUTO-ENTMCNC: 31.7 G/DL (ref 33.2–36.2)
MCV RBC AUTO: 83.6 FL (ref 81–97)
MD: NO
MONOCYTES # BLD AUTO: 0.9 X10^3/UL (ref 0.2–0.8)
MONOCYTES NFR BLD AUTO: 12 % (ref 2–9)
NEUTROPHILS # BLD AUTO: 5.36 X10^3/UL (ref 1.8–6.8)
NEUTROPHILS NFR BLD AUTO: 72 % (ref 42–75)
PLATELET # BLD AUTO: 391 X10^3/UL (ref 130–400)
PMV BLD AUTO: 7.7 FL (ref 7.4–10.4)
PROT SERPL-MCNC: 6.2 G/DL (ref 6.4–8.2)
RBC # BLD AUTO: 3.68 X10^6/UL (ref 4.38–5.82)
TSH SERPL-ACNC: 0.56 MIU/L (ref 0.36–3.74)

## 2019-05-19 RX ADMIN — AMPICILLIN SODIUM AND SULBACTAM SODIUM SCH MLS/HR: 2; 1 INJECTION, POWDER, FOR SOLUTION INTRAMUSCULAR; INTRAVENOUS at 16:11

## 2019-05-19 RX ADMIN — VANCOMYCIN HYDROCHLORIDE SCH MLS/HR: 1 INJECTION, POWDER, LYOPHILIZED, FOR SOLUTION INTRAVENOUS at 03:33

## 2019-05-19 RX ADMIN — AMPICILLIN SODIUM AND SULBACTAM SODIUM SCH MLS/HR: 2; 1 INJECTION, POWDER, FOR SOLUTION INTRAMUSCULAR; INTRAVENOUS at 21:03

## 2019-05-19 RX ADMIN — POTASSIUM CHLORIDE SCH MLS/HR: 2 INJECTION, SOLUTION, CONCENTRATE INTRAVENOUS at 13:13

## 2019-05-19 RX ADMIN — AMPICILLIN SODIUM AND SULBACTAM SODIUM SCH MLS/HR: 2; 1 INJECTION, POWDER, FOR SOLUTION INTRAMUSCULAR; INTRAVENOUS at 09:15

## 2019-05-19 RX ADMIN — AMPICILLIN SODIUM AND SULBACTAM SODIUM SCH MLS/HR: 2; 1 INJECTION, POWDER, FOR SOLUTION INTRAMUSCULAR; INTRAVENOUS at 02:57

## 2019-05-19 RX ADMIN — VANCOMYCIN HYDROCHLORIDE SCH MLS/HR: 1 INJECTION, POWDER, LYOPHILIZED, FOR SOLUTION INTRAVENOUS at 21:53

## 2019-05-20 VITALS — DIASTOLIC BLOOD PRESSURE: 89 MMHG | SYSTOLIC BLOOD PRESSURE: 151 MMHG

## 2019-05-20 VITALS — DIASTOLIC BLOOD PRESSURE: 74 MMHG | SYSTOLIC BLOOD PRESSURE: 122 MMHG

## 2019-05-20 LAB
ALBUMIN SERPL-MCNC: 2.4 G/DL (ref 3.4–5)
ALP SERPL-CCNC: 108 U/L (ref 45–117)
ALT SERPL-CCNC: 26 U/L (ref 12–78)
ANION GAP SERPL CALC-SCNC: 7 MMOL/L (ref 5–15)
BASOPHILS # BLD AUTO: 0.05 X10^3/UL (ref 0–0.1)
BASOPHILS NFR BLD AUTO: 1 % (ref 0–1)
BILIRUB SERPL-MCNC: 0.4 MG/DL (ref 0.2–1)
CALCIUM SERPL-MCNC: 8.3 MG/DL (ref 8.5–10.1)
CHLORIDE SERPL-SCNC: 107 MMOL/L (ref 98–107)
CREAT SERPL-MCNC: 0.84 MG/DL (ref 0.7–1.3)
EOSINOPHIL # BLD AUTO: 0.2 X10^3/UL (ref 0–0.4)
EOSINOPHIL NFR BLD AUTO: 3 % (ref 1–7)
ERYTHROCYTE [DISTWIDTH] IN BLOOD BY AUTOMATED COUNT: 18.1 % (ref 9.4–14.8)
LYMPHOCYTES # BLD AUTO: 0.99 X10^3/UL (ref 1–3.4)
LYMPHOCYTES NFR BLD AUTO: 16 % (ref 22–44)
MCH RBC QN AUTO: 26.2 PG (ref 27.5–34.5)
MCHC RBC AUTO-ENTMCNC: 31.3 G/DL (ref 33.2–36.2)
MCV RBC AUTO: 83.6 FL (ref 81–97)
MD: NO
MONOCYTES # BLD AUTO: 1.01 X10^3/UL (ref 0.2–0.8)
MONOCYTES NFR BLD AUTO: 16 % (ref 2–9)
NEUTROPHILS # BLD AUTO: 4.02 X10^3/UL (ref 1.8–6.8)
NEUTROPHILS NFR BLD AUTO: 64 % (ref 42–75)
PLATELET # BLD AUTO: 358 X10^3/UL (ref 130–400)
PMV BLD AUTO: 7 FL (ref 7.4–10.4)
PROT SERPL-MCNC: 6.2 G/DL (ref 6.4–8.2)
RBC # BLD AUTO: 3.58 X10^6/UL (ref 4.38–5.82)

## 2019-05-20 RX ADMIN — AMPICILLIN SODIUM AND SULBACTAM SODIUM SCH MLS/HR: 2; 1 INJECTION, POWDER, FOR SOLUTION INTRAMUSCULAR; INTRAVENOUS at 03:00

## 2019-05-22 ENCOUNTER — HOSPITAL ENCOUNTER (INPATIENT)
Dept: HOSPITAL 8 - ED | Age: 64
LOS: 2 days | Discharge: LEFT BEFORE DISCHARGE | DRG: 603 | End: 2019-05-24
Attending: HOSPITALIST | Admitting: HOSPITALIST
Payer: MEDICAID

## 2019-05-22 VITALS — SYSTOLIC BLOOD PRESSURE: 159 MMHG | DIASTOLIC BLOOD PRESSURE: 81 MMHG

## 2019-05-22 VITALS — WEIGHT: 159.17 LBS | BODY MASS INDEX: 22.28 KG/M2 | HEIGHT: 71 IN

## 2019-05-22 DIAGNOSIS — Z86.14: ICD-10-CM

## 2019-05-22 DIAGNOSIS — Z59.0: ICD-10-CM

## 2019-05-22 DIAGNOSIS — F10.20: ICD-10-CM

## 2019-05-22 DIAGNOSIS — D50.9: ICD-10-CM

## 2019-05-22 DIAGNOSIS — I10: ICD-10-CM

## 2019-05-22 DIAGNOSIS — E11.9: ICD-10-CM

## 2019-05-22 DIAGNOSIS — Z91.19: ICD-10-CM

## 2019-05-22 DIAGNOSIS — L03.116: Primary | ICD-10-CM

## 2019-05-22 DIAGNOSIS — J44.9: ICD-10-CM

## 2019-05-22 DIAGNOSIS — F17.200: ICD-10-CM

## 2019-05-22 DIAGNOSIS — K21.9: ICD-10-CM

## 2019-05-22 DIAGNOSIS — Z53.21: ICD-10-CM

## 2019-05-22 LAB
ALBUMIN SERPL-MCNC: 2.9 G/DL (ref 3.4–5)
ANION GAP SERPL CALC-SCNC: 9 MMOL/L (ref 5–15)
BASOPHILS # BLD AUTO: 0.02 X10^3/UL (ref 0–0.1)
BASOPHILS NFR BLD AUTO: 0 % (ref 0–1)
CALCIUM SERPL-MCNC: 8.4 MG/DL (ref 8.5–10.1)
CHLORIDE SERPL-SCNC: 107 MMOL/L (ref 98–107)
CREAT SERPL-MCNC: 0.81 MG/DL (ref 0.7–1.3)
EOSINOPHIL # BLD AUTO: 0.02 X10^3/UL (ref 0–0.4)
EOSINOPHIL NFR BLD AUTO: 0 % (ref 1–7)
ERYTHROCYTE [DISTWIDTH] IN BLOOD BY AUTOMATED COUNT: 18 % (ref 9.4–14.8)
LYMPHOCYTES # BLD AUTO: 1.03 X10^3/UL (ref 1–3.4)
LYMPHOCYTES NFR BLD AUTO: 11 % (ref 22–44)
MCH RBC QN AUTO: 25.6 PG (ref 27.5–34.5)
MCHC RBC AUTO-ENTMCNC: 30.8 G/DL (ref 33.2–36.2)
MCV RBC AUTO: 82.9 FL (ref 81–97)
MD: NO
MONOCYTES # BLD AUTO: 1.31 X10^3/UL (ref 0.2–0.8)
MONOCYTES NFR BLD AUTO: 14 % (ref 2–9)
NEUTROPHILS # BLD AUTO: 7.31 X10^3/UL (ref 1.8–6.8)
NEUTROPHILS NFR BLD AUTO: 75 % (ref 42–75)
PLATELET # BLD AUTO: 438 X10^3/UL (ref 130–400)
PMV BLD AUTO: 6.7 FL (ref 7.4–10.4)
RBC # BLD AUTO: 4.05 X10^6/UL (ref 4.38–5.82)
T4 FREE SERPL-MCNC: 1.11 NG/DL (ref 0.76–1.46)

## 2019-05-22 PROCEDURE — 85025 COMPLETE CBC W/AUTO DIFF WBC: CPT

## 2019-05-22 PROCEDURE — 84439 ASSAY OF FREE THYROXINE: CPT

## 2019-05-22 PROCEDURE — 82040 ASSAY OF SERUM ALBUMIN: CPT

## 2019-05-22 PROCEDURE — 99285 EMERGENCY DEPT VISIT HI MDM: CPT

## 2019-05-22 PROCEDURE — 87186 SC STD MICRODIL/AGAR DIL: CPT

## 2019-05-22 PROCEDURE — 96374 THER/PROPH/DIAG INJ IV PUSH: CPT

## 2019-05-22 PROCEDURE — 84100 ASSAY OF PHOSPHORUS: CPT

## 2019-05-22 PROCEDURE — 84443 ASSAY THYROID STIM HORMONE: CPT

## 2019-05-22 PROCEDURE — 83605 ASSAY OF LACTIC ACID: CPT

## 2019-05-22 PROCEDURE — 87147 CULTURE TYPE IMMUNOLOGIC: CPT

## 2019-05-22 PROCEDURE — 87205 SMEAR GRAM STAIN: CPT

## 2019-05-22 PROCEDURE — 87040 BLOOD CULTURE FOR BACTERIA: CPT

## 2019-05-22 PROCEDURE — 87077 CULTURE AEROBIC IDENTIFY: CPT

## 2019-05-22 PROCEDURE — 36415 COLL VENOUS BLD VENIPUNCTURE: CPT

## 2019-05-22 PROCEDURE — 83735 ASSAY OF MAGNESIUM: CPT

## 2019-05-22 PROCEDURE — 80048 BASIC METABOLIC PNL TOTAL CA: CPT

## 2019-05-22 PROCEDURE — 87070 CULTURE OTHR SPECIMN AEROBIC: CPT

## 2019-05-22 PROCEDURE — 80053 COMPREHEN METABOLIC PANEL: CPT

## 2019-05-22 RX ADMIN — CEFTAZIDIME SCH MLS/HR: 2 INJECTION, POWDER, FOR SOLUTION INTRAVENOUS at 18:25

## 2019-05-22 RX ADMIN — ENOXAPARIN SODIUM SCH MG: 40 INJECTION SUBCUTANEOUS at 14:00

## 2019-05-22 SDOH — ECONOMIC STABILITY - HOUSING INSECURITY: HOMELESSNESS: Z59.0

## 2019-05-23 VITALS — SYSTOLIC BLOOD PRESSURE: 146 MMHG | DIASTOLIC BLOOD PRESSURE: 83 MMHG

## 2019-05-23 VITALS — SYSTOLIC BLOOD PRESSURE: 143 MMHG | DIASTOLIC BLOOD PRESSURE: 88 MMHG

## 2019-05-23 VITALS — DIASTOLIC BLOOD PRESSURE: 93 MMHG | SYSTOLIC BLOOD PRESSURE: 148 MMHG

## 2019-05-23 VITALS — SYSTOLIC BLOOD PRESSURE: 132 MMHG | DIASTOLIC BLOOD PRESSURE: 85 MMHG

## 2019-05-23 LAB
<PLATELET ESTIMATE>: ADEQUATE
<PLT MORPHOLOGY>: (no result)
ALBUMIN SERPL-MCNC: 2.5 G/DL (ref 3.4–5)
ALP SERPL-CCNC: 107 U/L (ref 45–117)
ALT SERPL-CCNC: 41 U/L (ref 12–78)
ANION GAP SERPL CALC-SCNC: 8 MMOL/L (ref 5–15)
ANISOCYTOSIS BLD QL SMEAR: (no result)
BILIRUB SERPL-MCNC: 0.4 MG/DL (ref 0.2–1)
CALCIUM SERPL-MCNC: 8.4 MG/DL (ref 8.5–10.1)
CHLORIDE SERPL-SCNC: 107 MMOL/L (ref 98–107)
CREAT SERPL-MCNC: 0.76 MG/DL (ref 0.7–1.3)
EOS#(MANUAL): 0.11 X10^3/UL (ref 0–0.4)
EOS% (MANUAL): 2 % (ref 1–7)
ERYTHROCYTE [DISTWIDTH] IN BLOOD BY AUTOMATED COUNT: 17.9 % (ref 9.4–14.8)
HYPOCHROMIA BLD QL SMEAR: (no result)
LYMPH#(MANUAL): 1.65 X10^3/UL (ref 1–3.4)
LYMPHS% (MANUAL): 30 % (ref 22–44)
MCH RBC QN AUTO: 25.8 PG (ref 27.5–34.5)
MCHC RBC AUTO-ENTMCNC: 31.5 G/DL (ref 33.2–36.2)
MCV RBC AUTO: 81.8 FL (ref 81–97)
MD: YES
MONOS#(MANUAL): 0.88 X10^3/UL (ref 0.3–2.7)
MONOS% (MANUAL): 16 % (ref 2–9)
PLATELET # BLD AUTO: 385 X10^3/UL (ref 130–400)
PMV BLD AUTO: 6.9 FL (ref 7.4–10.4)
POLYCHROMASIA BLD QL SMEAR: (no result)
PROT SERPL-MCNC: 6.6 G/DL (ref 6.4–8.2)
RBC # BLD AUTO: 3.74 X10^6/UL (ref 4.38–5.82)
SEG#(MANUAL): 2.86 X10^3/UL (ref 1.8–6.8)
SEGS% (MANUAL): 52 % (ref 42–75)
TSH SERPL-ACNC: 0.94 MIU/L (ref 0.36–3.74)

## 2019-05-23 RX ADMIN — ENOXAPARIN SODIUM SCH MG: 40 INJECTION SUBCUTANEOUS at 14:00

## 2019-05-23 RX ADMIN — CEFTAZIDIME SCH MLS/HR: 2 INJECTION, POWDER, FOR SOLUTION INTRAVENOUS at 11:27

## 2019-05-23 RX ADMIN — CEFTAZIDIME SCH MLS/HR: 2 INJECTION, POWDER, FOR SOLUTION INTRAVENOUS at 18:41

## 2019-05-23 RX ADMIN — CEFTAZIDIME SCH MLS/HR: 2 INJECTION, POWDER, FOR SOLUTION INTRAVENOUS at 02:25

## 2019-05-24 ENCOUNTER — HOSPITAL ENCOUNTER (EMERGENCY)
Dept: HOSPITAL 8 - ED | Age: 64
LOS: 1 days | Discharge: HOME | End: 2019-05-25
Payer: MEDICAID

## 2019-05-24 VITALS — BODY MASS INDEX: 24.38 KG/M2 | WEIGHT: 174.17 LBS | HEIGHT: 71 IN

## 2019-05-24 VITALS — DIASTOLIC BLOOD PRESSURE: 76 MMHG | SYSTOLIC BLOOD PRESSURE: 119 MMHG
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
## 2019-05-24 DIAGNOSIS — I87.2: ICD-10-CM

## 2019-05-24 DIAGNOSIS — L03.116: ICD-10-CM

## 2019-05-24 DIAGNOSIS — I10: ICD-10-CM
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
## 2019-05-24 DIAGNOSIS — J44.9: ICD-10-CM
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
## 2019-05-24 DIAGNOSIS — E11.9: ICD-10-CM
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
## 2019-05-24 DIAGNOSIS — S81.812A: Primary | ICD-10-CM

## 2019-05-24 DIAGNOSIS — F10.220: ICD-10-CM

## 2019-05-24 LAB
ALBUMIN SERPL-MCNC: 2.8 G/DL (ref 3.4–5)
ALP SERPL-CCNC: 114 U/L (ref 45–117)
ALT SERPL-CCNC: 51 U/L (ref 12–78)
ANION GAP SERPL CALC-SCNC: 9 MMOL/L (ref 5–15)
BASOPHILS # BLD AUTO: 0.03 X10^3/UL (ref 0–0.1)
BASOPHILS NFR BLD AUTO: 0 % (ref 0–1)
BILIRUB SERPL-MCNC: 0.6 MG/DL (ref 0.2–1)
CALCIUM SERPL-MCNC: 8.5 MG/DL (ref 8.5–10.1)
CHLORIDE SERPL-SCNC: 106 MMOL/L (ref 98–107)
CREAT SERPL-MCNC: 0.86 MG/DL (ref 0.7–1.3)
EOSINOPHIL # BLD AUTO: 0.12 X10^3/UL (ref 0–0.4)
EOSINOPHIL NFR BLD AUTO: 2 % (ref 1–7)
ERYTHROCYTE [DISTWIDTH] IN BLOOD BY AUTOMATED COUNT: 17.6 % (ref 9.4–14.8)
LYMPHOCYTES # BLD AUTO: 1.55 X10^3/UL (ref 1–3.4)
LYMPHOCYTES NFR BLD AUTO: 22 % (ref 22–44)
MCH RBC QN AUTO: 26.5 PG (ref 27.5–34.5)
MCHC RBC AUTO-ENTMCNC: 32.2 G/DL (ref 33.2–36.2)
MCV RBC AUTO: 82.3 FL (ref 81–97)
MD: NO
MONOCYTES # BLD AUTO: 1.17 X10^3/UL (ref 0.2–0.8)
MONOCYTES NFR BLD AUTO: 16 % (ref 2–9)
NEUTROPHILS # BLD AUTO: 4.34 X10^3/UL (ref 1.8–6.8)
NEUTROPHILS NFR BLD AUTO: 60 % (ref 42–75)
PLATELET # BLD AUTO: 406 X10^3/UL (ref 130–400)
PMV BLD AUTO: 7.3 FL (ref 7.4–10.4)
PROT SERPL-MCNC: 7.5 G/DL (ref 6.4–8.2)
RBC # BLD AUTO: 3.84 X10^6/UL (ref 4.38–5.82)

## 2019-05-24 PROCEDURE — 85025 COMPLETE CBC W/AUTO DIFF WBC: CPT

## 2019-05-24 PROCEDURE — 80053 COMPREHEN METABOLIC PANEL: CPT

## 2019-05-24 PROCEDURE — 12032 INTMD RPR S/A/T/EXT 2.6-7.5: CPT

## 2019-05-24 PROCEDURE — 83605 ASSAY OF LACTIC ACID: CPT

## 2019-05-24 PROCEDURE — 90715 TDAP VACCINE 7 YRS/> IM: CPT

## 2019-05-24 PROCEDURE — 36415 COLL VENOUS BLD VENIPUNCTURE: CPT

## 2019-05-24 PROCEDURE — 84145 PROCALCITONIN (PCT): CPT

## 2019-05-24 PROCEDURE — 85730 THROMBOPLASTIN TIME PARTIAL: CPT

## 2019-05-24 PROCEDURE — 85610 PROTHROMBIN TIME: CPT

## 2019-05-24 PROCEDURE — 99284 EMERGENCY DEPT VISIT MOD MDM: CPT

## 2019-05-24 PROCEDURE — 90471 IMMUNIZATION ADMIN: CPT

## 2019-05-24 PROCEDURE — 80307 DRUG TEST PRSMV CHEM ANLYZR: CPT

## 2019-05-24 PROCEDURE — 87040 BLOOD CULTURE FOR BACTERIA: CPT

## 2019-05-24 RX ADMIN — CEFTAZIDIME SCH MLS/HR: 2 INJECTION, POWDER, FOR SOLUTION INTRAVENOUS at 03:39

## 2019-05-25 VITALS — SYSTOLIC BLOOD PRESSURE: 147 MMHG | DIASTOLIC BLOOD PRESSURE: 92 MMHG

## 2019-05-25 LAB
ALBUMIN SERPL-MCNC: 2.7 G/DL (ref 3.4–5)
ALP SERPL-CCNC: 108 U/L (ref 45–117)
ALT SERPL-CCNC: 40 U/L (ref 12–78)
ANION GAP SERPL CALC-SCNC: 11 MMOL/L (ref 5–15)
APTT BLD: 32 SECONDS (ref 25–31)
BASOPHILS # BLD AUTO: 0.09 X10^3/UL (ref 0–0.1)
BASOPHILS NFR BLD AUTO: 1 % (ref 0–1)
BILIRUB SERPL-MCNC: < 0.1 MG/DL (ref 0.2–1)
CALCIUM SERPL-MCNC: 8.1 MG/DL (ref 8.5–10.1)
CHLORIDE SERPL-SCNC: 109 MMOL/L (ref 98–107)
CREAT SERPL-MCNC: 0.76 MG/DL (ref 0.7–1.3)
EOSINOPHIL # BLD AUTO: 0.1 X10^3/UL (ref 0–0.4)
EOSINOPHIL NFR BLD AUTO: 2 % (ref 1–7)
ERYTHROCYTE [DISTWIDTH] IN BLOOD BY AUTOMATED COUNT: 17.5 % (ref 9.4–14.8)
INR PPP: 0.96 (ref 0.93–1.1)
LYMPHOCYTES # BLD AUTO: 1.68 X10^3/UL (ref 1–3.4)
LYMPHOCYTES NFR BLD AUTO: 25 % (ref 22–44)
MCH RBC QN AUTO: 26.5 PG (ref 27.5–34.5)
MCHC RBC AUTO-ENTMCNC: 31.9 G/DL (ref 33.2–36.2)
MCV RBC AUTO: 83 FL (ref 81–97)
MD: NO
MONOCYTES # BLD AUTO: 0.88 X10^3/UL (ref 0.2–0.8)
MONOCYTES NFR BLD AUTO: 13 % (ref 2–9)
NEUTROPHILS # BLD AUTO: 4.08 X10^3/UL (ref 1.8–6.8)
NEUTROPHILS NFR BLD AUTO: 60 % (ref 42–75)
PLATELET # BLD AUTO: 464 X10^3/UL (ref 130–400)
PMV BLD AUTO: 6.9 FL (ref 7.4–10.4)
PROT SERPL-MCNC: 7.3 G/DL (ref 6.4–8.2)
PROTHROMBIN TIME: 10.1 SECONDS (ref 9.6–11.5)
RBC # BLD AUTO: 3.72 X10^6/UL (ref 4.38–5.82)

## 2019-05-26 ENCOUNTER — APPOINTMENT (OUTPATIENT)
Dept: RADIOLOGY | Facility: MEDICAL CENTER | Age: 64
DRG: 603 | End: 2019-05-26
Attending: EMERGENCY MEDICINE
Payer: MEDICAID

## 2019-05-26 ENCOUNTER — HOSPITAL ENCOUNTER (INPATIENT)
Facility: MEDICAL CENTER | Age: 64
LOS: 7 days | DRG: 603 | End: 2019-06-03
Attending: EMERGENCY MEDICINE | Admitting: HOSPITALIST
Payer: MEDICAID

## 2019-05-26 DIAGNOSIS — L03.116 CELLULITIS OF LEFT LOWER EXTREMITY: ICD-10-CM

## 2019-05-26 DIAGNOSIS — S81.802D OPEN WOUND OF LEFT LOWER LEG, SUBSEQUENT ENCOUNTER: ICD-10-CM

## 2019-05-26 PROCEDURE — 73590 X-RAY EXAM OF LOWER LEG: CPT | Mod: LT

## 2019-05-26 PROCEDURE — 73552 X-RAY EXAM OF FEMUR 2/>: CPT | Mod: LT

## 2019-05-26 PROCEDURE — 83605 ASSAY OF LACTIC ACID: CPT

## 2019-05-26 PROCEDURE — 73630 X-RAY EXAM OF FOOT: CPT | Mod: LT

## 2019-05-26 PROCEDURE — 80053 COMPREHEN METABOLIC PANEL: CPT

## 2019-05-26 PROCEDURE — 85025 COMPLETE CBC W/AUTO DIFF WBC: CPT

## 2019-05-26 PROCEDURE — 99285 EMERGENCY DEPT VISIT HI MDM: CPT

## 2019-05-26 PROCEDURE — 86140 C-REACTIVE PROTEIN: CPT

## 2019-05-26 PROCEDURE — 87040 BLOOD CULTURE FOR BACTERIA: CPT

## 2019-05-26 RX ORDER — SODIUM CHLORIDE 9 MG/ML
1000 INJECTION, SOLUTION INTRAVENOUS
Status: DISCONTINUED | OUTPATIENT
Start: 2019-05-26 | End: 2019-06-03 | Stop reason: HOSPADM

## 2019-05-27 PROBLEM — Z72.0 TOBACCO ABUSE: Status: ACTIVE | Noted: 2019-05-27

## 2019-05-27 PROBLEM — L03.90 CELLULITIS: Status: ACTIVE | Noted: 2019-05-27

## 2019-05-27 PROBLEM — Z78.9 ALCOHOL USE: Status: ACTIVE | Noted: 2019-05-27

## 2019-05-27 LAB
ALBUMIN SERPL BCP-MCNC: 3.6 G/DL (ref 3.2–4.9)
ALBUMIN/GLOB SERPL: 1.1 G/DL
ALP SERPL-CCNC: 99 U/L (ref 30–99)
ALT SERPL-CCNC: 26 U/L (ref 2–50)
ANION GAP SERPL CALC-SCNC: 9 MMOL/L (ref 0–11.9)
APPEARANCE UR: CLEAR
AST SERPL-CCNC: 25 U/L (ref 12–45)
BASOPHILS # BLD AUTO: 1 % (ref 0–1.8)
BASOPHILS # BLD: 0.06 K/UL (ref 0–0.12)
BILIRUB SERPL-MCNC: 0.2 MG/DL (ref 0.1–1.5)
BILIRUB UR QL STRIP.AUTO: NEGATIVE
BUN SERPL-MCNC: 14 MG/DL (ref 8–22)
CALCIUM SERPL-MCNC: 8.8 MG/DL (ref 8.5–10.5)
CHLORIDE SERPL-SCNC: 105 MMOL/L (ref 96–112)
CO2 SERPL-SCNC: 25 MMOL/L (ref 20–33)
COLOR UR: YELLOW
CREAT SERPL-MCNC: 0.77 MG/DL (ref 0.5–1.4)
CRP SERPL HS-MCNC: 5.04 MG/DL (ref 0–0.75)
EOSINOPHIL # BLD AUTO: 0.13 K/UL (ref 0–0.51)
EOSINOPHIL NFR BLD: 2.2 % (ref 0–6.9)
ERYTHROCYTE [DISTWIDTH] IN BLOOD BY AUTOMATED COUNT: 51 FL (ref 35.9–50)
GLOBULIN SER CALC-MCNC: 3.4 G/DL (ref 1.9–3.5)
GLUCOSE SERPL-MCNC: 100 MG/DL (ref 65–99)
GLUCOSE UR STRIP.AUTO-MCNC: NEGATIVE MG/DL
HCT VFR BLD AUTO: 30.9 % (ref 42–52)
HGB BLD-MCNC: 9.5 G/DL (ref 14–18)
IMM GRANULOCYTES # BLD AUTO: 0.05 K/UL (ref 0–0.11)
IMM GRANULOCYTES NFR BLD AUTO: 0.9 % (ref 0–0.9)
KETONES UR STRIP.AUTO-MCNC: NEGATIVE MG/DL
LACTATE BLD-SCNC: 0.8 MMOL/L (ref 0.5–2)
LEUKOCYTE ESTERASE UR QL STRIP.AUTO: NEGATIVE
LYMPHOCYTES # BLD AUTO: 1.68 K/UL (ref 1–4.8)
LYMPHOCYTES NFR BLD: 28.9 % (ref 22–41)
MCH RBC QN AUTO: 25.7 PG (ref 27–33)
MCHC RBC AUTO-ENTMCNC: 30.7 G/DL (ref 33.7–35.3)
MCV RBC AUTO: 83.7 FL (ref 81.4–97.8)
MICRO URNS: NORMAL
MONOCYTES # BLD AUTO: 0.84 K/UL (ref 0–0.85)
MONOCYTES NFR BLD AUTO: 14.4 % (ref 0–13.4)
NEUTROPHILS # BLD AUTO: 3.06 K/UL (ref 1.82–7.42)
NEUTROPHILS NFR BLD: 52.6 % (ref 44–72)
NITRITE UR QL STRIP.AUTO: NEGATIVE
NRBC # BLD AUTO: 0 K/UL
NRBC BLD-RTO: 0 /100 WBC
PH UR STRIP.AUTO: 6.5 [PH]
PLATELET # BLD AUTO: 455 K/UL (ref 164–446)
PMV BLD AUTO: 8.7 FL (ref 9–12.9)
POTASSIUM SERPL-SCNC: 4.3 MMOL/L (ref 3.6–5.5)
PROT SERPL-MCNC: 7 G/DL (ref 6–8.2)
PROT UR QL STRIP: NEGATIVE MG/DL
RBC # BLD AUTO: 3.69 M/UL (ref 4.7–6.1)
RBC UR QL AUTO: NEGATIVE
SODIUM SERPL-SCNC: 139 MMOL/L (ref 135–145)
SP GR UR STRIP.AUTO: 1.02
UROBILINOGEN UR STRIP.AUTO-MCNC: 0.2 MG/DL
WBC # BLD AUTO: 5.8 K/UL (ref 4.8–10.8)

## 2019-05-27 PROCEDURE — HZ2ZZZZ DETOXIFICATION SERVICES FOR SUBSTANCE ABUSE TREATMENT: ICD-10-PCS | Performed by: HOSPITALIST

## 2019-05-27 PROCEDURE — 700111 HCHG RX REV CODE 636 W/ 250 OVERRIDE (IP): Performed by: HOSPITALIST

## 2019-05-27 PROCEDURE — 81003 URINALYSIS AUTO W/O SCOPE: CPT

## 2019-05-27 PROCEDURE — 96367 TX/PROPH/DG ADDL SEQ IV INF: CPT

## 2019-05-27 PROCEDURE — 700101 HCHG RX REV CODE 250: Performed by: HOSPITALIST

## 2019-05-27 PROCEDURE — 99406 BEHAV CHNG SMOKING 3-10 MIN: CPT | Performed by: HOSPITALIST

## 2019-05-27 PROCEDURE — 97597 DBRDMT OPN WND 1ST 20 CM/<: CPT

## 2019-05-27 PROCEDURE — 700102 HCHG RX REV CODE 250 W/ 637 OVERRIDE(OP): Performed by: HOSPITALIST

## 2019-05-27 PROCEDURE — 97161 PT EVAL LOW COMPLEX 20 MIN: CPT

## 2019-05-27 PROCEDURE — 99223 1ST HOSP IP/OBS HIGH 75: CPT | Mod: 25 | Performed by: HOSPITALIST

## 2019-05-27 PROCEDURE — 700105 HCHG RX REV CODE 258: Performed by: EMERGENCY MEDICINE

## 2019-05-27 PROCEDURE — 770006 HCHG ROOM/CARE - MED/SURG/GYN SEMI*

## 2019-05-27 PROCEDURE — 700105 HCHG RX REV CODE 258: Performed by: HOSPITALIST

## 2019-05-27 PROCEDURE — 700111 HCHG RX REV CODE 636 W/ 250 OVERRIDE (IP): Performed by: EMERGENCY MEDICINE

## 2019-05-27 PROCEDURE — 700111 HCHG RX REV CODE 636 W/ 250 OVERRIDE (IP): Performed by: INTERNAL MEDICINE

## 2019-05-27 PROCEDURE — 96365 THER/PROPH/DIAG IV INF INIT: CPT

## 2019-05-27 PROCEDURE — A9270 NON-COVERED ITEM OR SERVICE: HCPCS | Performed by: HOSPITALIST

## 2019-05-27 RX ORDER — NICOTINE 21 MG/24HR
14 PATCH, TRANSDERMAL 24 HOURS TRANSDERMAL
Status: DISCONTINUED | OUTPATIENT
Start: 2019-05-27 | End: 2019-06-03 | Stop reason: HOSPADM

## 2019-05-27 RX ORDER — FOLIC ACID 1 MG/1
1 TABLET ORAL DAILY
Status: COMPLETED | OUTPATIENT
Start: 2019-05-28 | End: 2019-05-31

## 2019-05-27 RX ORDER — LORAZEPAM 1 MG/1
3 TABLET ORAL
Status: DISCONTINUED | OUTPATIENT
Start: 2019-05-27 | End: 2019-06-03 | Stop reason: HOSPADM

## 2019-05-27 RX ORDER — NAPROXEN 500 MG/1
500 TABLET ORAL 3 TIMES DAILY PRN
Status: DISCONTINUED | OUTPATIENT
Start: 2019-05-27 | End: 2019-06-03 | Stop reason: HOSPADM

## 2019-05-27 RX ORDER — MORPHINE SULFATE 4 MG/ML
2 INJECTION, SOLUTION INTRAMUSCULAR; INTRAVENOUS ONCE
Status: COMPLETED | OUTPATIENT
Start: 2019-05-27 | End: 2019-05-27

## 2019-05-27 RX ORDER — HEPARIN SODIUM 5000 [USP'U]/ML
5000 INJECTION, SOLUTION INTRAVENOUS; SUBCUTANEOUS EVERY 8 HOURS
Status: DISCONTINUED | OUTPATIENT
Start: 2019-05-27 | End: 2019-05-27

## 2019-05-27 RX ORDER — TRAMADOL HYDROCHLORIDE 50 MG/1
50 TABLET ORAL EVERY 6 HOURS PRN
Status: DISCONTINUED | OUTPATIENT
Start: 2019-05-27 | End: 2019-06-03 | Stop reason: HOSPADM

## 2019-05-27 RX ORDER — LORAZEPAM 0.5 MG/1
0.5 TABLET ORAL EVERY 4 HOURS PRN
Status: DISCONTINUED | OUTPATIENT
Start: 2019-05-27 | End: 2019-06-03 | Stop reason: HOSPADM

## 2019-05-27 RX ORDER — LORAZEPAM 2 MG/ML
1.5 INJECTION INTRAMUSCULAR
Status: DISCONTINUED | OUTPATIENT
Start: 2019-05-27 | End: 2019-06-03 | Stop reason: HOSPADM

## 2019-05-27 RX ORDER — THIAMINE MONONITRATE (VIT B1) 100 MG
100 TABLET ORAL DAILY
Status: COMPLETED | OUTPATIENT
Start: 2019-05-28 | End: 2019-05-31

## 2019-05-27 RX ORDER — POLYETHYLENE GLYCOL 3350 17 G/17G
1 POWDER, FOR SOLUTION ORAL
Status: DISCONTINUED | OUTPATIENT
Start: 2019-05-27 | End: 2019-06-03 | Stop reason: HOSPADM

## 2019-05-27 RX ORDER — SODIUM CHLORIDE 9 MG/ML
500 INJECTION, SOLUTION INTRAVENOUS
Status: DISCONTINUED | OUTPATIENT
Start: 2019-05-27 | End: 2019-06-03 | Stop reason: HOSPADM

## 2019-05-27 RX ORDER — LORAZEPAM 0.5 MG/1
2 TABLET ORAL
Status: DISCONTINUED | OUTPATIENT
Start: 2019-05-27 | End: 2019-06-03 | Stop reason: HOSPADM

## 2019-05-27 RX ORDER — BISACODYL 10 MG
10 SUPPOSITORY, RECTAL RECTAL
Status: DISCONTINUED | OUTPATIENT
Start: 2019-05-27 | End: 2019-06-03 | Stop reason: HOSPADM

## 2019-05-27 RX ORDER — LORAZEPAM 1 MG/1
4 TABLET ORAL
Status: DISCONTINUED | OUTPATIENT
Start: 2019-05-27 | End: 2019-06-03 | Stop reason: HOSPADM

## 2019-05-27 RX ORDER — LORAZEPAM 2 MG/ML
1 INJECTION INTRAMUSCULAR
Status: DISCONTINUED | OUTPATIENT
Start: 2019-05-27 | End: 2019-06-03 | Stop reason: HOSPADM

## 2019-05-27 RX ORDER — LORAZEPAM 0.5 MG/1
1 TABLET ORAL EVERY 4 HOURS PRN
Status: DISCONTINUED | OUTPATIENT
Start: 2019-05-27 | End: 2019-06-03 | Stop reason: HOSPADM

## 2019-05-27 RX ORDER — SODIUM CHLORIDE 9 MG/ML
2000 INJECTION, SOLUTION INTRAVENOUS ONCE
Status: COMPLETED | OUTPATIENT
Start: 2019-05-27 | End: 2019-05-27

## 2019-05-27 RX ORDER — AMOXICILLIN 250 MG
2 CAPSULE ORAL 2 TIMES DAILY
Status: DISCONTINUED | OUTPATIENT
Start: 2019-05-27 | End: 2019-06-03 | Stop reason: HOSPADM

## 2019-05-27 RX ORDER — ACETAMINOPHEN 325 MG/1
650 TABLET ORAL EVERY 6 HOURS PRN
Status: DISCONTINUED | OUTPATIENT
Start: 2019-05-27 | End: 2019-06-03 | Stop reason: HOSPADM

## 2019-05-27 RX ORDER — LORAZEPAM 2 MG/ML
2 INJECTION INTRAMUSCULAR
Status: DISCONTINUED | OUTPATIENT
Start: 2019-05-27 | End: 2019-06-03 | Stop reason: HOSPADM

## 2019-05-27 RX ORDER — LORAZEPAM 2 MG/ML
0.5 INJECTION INTRAMUSCULAR EVERY 4 HOURS PRN
Status: DISCONTINUED | OUTPATIENT
Start: 2019-05-27 | End: 2019-06-03 | Stop reason: HOSPADM

## 2019-05-27 RX ADMIN — LORAZEPAM 1 MG: 2 INJECTION INTRAMUSCULAR; INTRAVENOUS at 21:50

## 2019-05-27 RX ADMIN — THIAMINE HYDROCHLORIDE: 100 INJECTION, SOLUTION INTRAMUSCULAR; INTRAVENOUS at 08:44

## 2019-05-27 RX ADMIN — AMPICILLIN SODIUM AND SULBACTAM SODIUM 3 G: 2; 1 INJECTION, POWDER, FOR SOLUTION INTRAMUSCULAR; INTRAVENOUS at 12:17

## 2019-05-27 RX ADMIN — DOXYCYCLINE 100 MG: 100 INJECTION, POWDER, LYOPHILIZED, FOR SOLUTION INTRAVENOUS at 07:44

## 2019-05-27 RX ADMIN — MORPHINE SULFATE 2 MG: 4 INJECTION INTRAVENOUS at 03:00

## 2019-05-27 RX ADMIN — HEPARIN SODIUM 5000 UNITS: 5000 INJECTION, SOLUTION INTRAVENOUS; SUBCUTANEOUS at 03:00

## 2019-05-27 RX ADMIN — AMPICILLIN SODIUM AND SULBACTAM SODIUM 3 G: 2; 1 INJECTION, POWDER, FOR SOLUTION INTRAMUSCULAR; INTRAVENOUS at 17:50

## 2019-05-27 RX ADMIN — ACETAMINOPHEN 650 MG: 325 TABLET, FILM COATED ORAL at 02:59

## 2019-05-27 RX ADMIN — FOLIC ACID 1 MG: 1 TABLET ORAL at 23:55

## 2019-05-27 RX ADMIN — SODIUM CHLORIDE 2000 ML: 9 INJECTION, SOLUTION INTRAVENOUS at 03:02

## 2019-05-27 RX ADMIN — ACETAMINOPHEN 650 MG: 325 TABLET, FILM COATED ORAL at 17:56

## 2019-05-27 RX ADMIN — LORAZEPAM 0.5 MG: 2 INJECTION INTRAMUSCULAR; INTRAVENOUS at 23:55

## 2019-05-27 RX ADMIN — DOXYCYCLINE 100 MG: 100 INJECTION, POWDER, LYOPHILIZED, FOR SOLUTION INTRAVENOUS at 18:39

## 2019-05-27 RX ADMIN — AMPICILLIN SODIUM AND SULBACTAM SODIUM 3 G: 2; 1 INJECTION, POWDER, FOR SOLUTION INTRAMUSCULAR; INTRAVENOUS at 00:03

## 2019-05-27 RX ADMIN — THERA TABS 1 TABLET: TAB at 23:55

## 2019-05-27 RX ADMIN — Medication 100 MG: at 23:55

## 2019-05-27 RX ADMIN — AMPICILLIN SODIUM AND SULBACTAM SODIUM 3 G: 2; 1 INJECTION, POWDER, FOR SOLUTION INTRAMUSCULAR; INTRAVENOUS at 05:54

## 2019-05-27 RX ADMIN — VANCOMYCIN HYDROCHLORIDE 1800 MG: 100 INJECTION, POWDER, LYOPHILIZED, FOR SOLUTION INTRAVENOUS at 01:25

## 2019-05-27 RX ADMIN — AMPICILLIN SODIUM AND SULBACTAM SODIUM 3 G: 2; 1 INJECTION, POWDER, FOR SOLUTION INTRAMUSCULAR; INTRAVENOUS at 23:55

## 2019-05-27 ASSESSMENT — LIFESTYLE VARIABLES
AUDITORY DISTURBANCES: NOT PRESENT
CONSUMPTION TOTAL: NEGATIVE
AVERAGE NUMBER OF DAYS PER WEEK YOU HAVE A DRINK CONTAINING ALCOHOL: 7
NAUSEA AND VOMITING: NO NAUSEA AND NO VOMITING
ANXIETY: *
HEADACHE, FULLNESS IN HEAD: MILD
VISUAL DISTURBANCES: NOT PRESENT
VISUAL DISTURBANCES: NOT PRESENT
PAROXYSMAL SWEATS: *
AGITATION: *
NAUSEA AND VOMITING: NO NAUSEA AND NO VOMITING
TREMOR: TREMOR NOT VISIBLE BUT CAN BE FELT, FINGERTIP TO FINGERTIP
HAVE YOU EVER FELT YOU SHOULD CUT DOWN ON YOUR DRINKING: NO
TOTAL SCORE: 0
TOTAL SCORE: 0
ALCOHOL_USE: YES
TOTAL SCORE: 10
TOTAL SCORE: 14
EVER FELT BAD OR GUILTY ABOUT YOUR DRINKING: NO
HEADACHE, FULLNESS IN HEAD: MODERATE
ORIENTATION AND CLOUDING OF SENSORIUM: ORIENTED AND CAN DO SERIAL ADDITIONS
ANXIETY: MILDLY ANXIOUS
AGITATION: SOMEWHAT MORE THAN NORMAL ACTIVITY
TOTAL SCORE: VERY MILD ITCHING, PINS AND NEEDLES SENSATION, BURNING OR NUMBNESS
HAVE PEOPLE ANNOYED YOU BY CRITICIZING YOUR DRINKING: NO
TREMOR: *
EVER HAD A DRINK FIRST THING IN THE MORNING TO STEADY YOUR NERVES TO GET RID OF A HANGOVER: NO
AUDITORY DISTURBANCES: NOT PRESENT
ON A TYPICAL DAY WHEN YOU DRINK ALCOHOL HOW MANY DRINKS DO YOU HAVE: 1
HOW MANY TIMES IN THE PAST YEAR HAVE YOU HAD 5 OR MORE DRINKS IN A DAY: 0
TOTAL SCORE: 0
ORIENTATION AND CLOUDING OF SENSORIUM: ORIENTED AND CAN DO SERIAL ADDITIONS
PAROXYSMAL SWEATS: *

## 2019-05-27 ASSESSMENT — COGNITIVE AND FUNCTIONAL STATUS - GENERAL
DAILY ACTIVITIY SCORE: 19
DRESSING REGULAR LOWER BODY CLOTHING: A LITTLE
SUGGESTED CMS G CODE MODIFIER MOBILITY: CK
MOVING TO AND FROM BED TO CHAIR: A LITTLE
TURNING FROM BACK TO SIDE WHILE IN FLAT BAD: A LITTLE
MOBILITY SCORE: 17
HELP NEEDED FOR BATHING: A LITTLE
MOVING TO AND FROM BED TO CHAIR: A LITTLE
STANDING UP FROM CHAIR USING ARMS: A LITTLE
MOVING FROM LYING ON BACK TO SITTING ON SIDE OF FLAT BED: A LITTLE
DRESSING REGULAR LOWER BODY CLOTHING: A LOT
HELP NEEDED FOR BATHING: A LOT
SUGGESTED CMS G CODE MODIFIER MOBILITY: CK
TOILETING: A LITTLE
TOILETING: A LITTLE
MOBILITY SCORE: 17
CLIMB 3 TO 5 STEPS WITH RAILING: A LITTLE
WALKING IN HOSPITAL ROOM: A LOT
WALKING IN HOSPITAL ROOM: A LOT
TURNING FROM BACK TO SIDE WHILE IN FLAT BAD: A LITTLE
MOVING FROM LYING ON BACK TO SITTING ON SIDE OF FLAT BED: A LITTLE
CLIMB 3 TO 5 STEPS WITH RAILING: A LITTLE
SUGGESTED CMS G CODE MODIFIER DAILY ACTIVITY: CK
STANDING UP FROM CHAIR USING ARMS: A LITTLE

## 2019-05-27 ASSESSMENT — ENCOUNTER SYMPTOMS
COUGH: 0
PALPITATIONS: 0
PHOTOPHOBIA: 0
HEADACHES: 0
CHILLS: 0
TINGLING: 0
DIZZINESS: 0
MYALGIAS: 0
FEVER: 0
VOMITING: 0
WHEEZING: 0
FOCAL WEAKNESS: 0
SHORTNESS OF BREATH: 0
DEPRESSION: 0
NAUSEA: 0
ABDOMINAL PAIN: 0
SORE THROAT: 0
DIARRHEA: 0

## 2019-05-27 ASSESSMENT — COPD QUESTIONNAIRES
DURING THE PAST 4 WEEKS HOW MUCH DID YOU FEEL SHORT OF BREATH: NONE/LITTLE OF THE TIME
HAVE YOU SMOKED AT LEAST 100 CIGARETTES IN YOUR ENTIRE LIFE: YES
DO YOU EVER COUGH UP ANY MUCUS OR PHLEGM?: NO/ONLY WITH OCCASIONAL COLDS OR INFECTIONS
IN THE PAST 12 MONTHS DO YOU DO LESS THAN YOU USED TO BECAUSE OF YOUR BREATHING PROBLEMS: DISAGREE/UNSURE
COPD SCREENING SCORE: 4

## 2019-05-27 ASSESSMENT — PATIENT HEALTH QUESTIONNAIRE - PHQ9
1. LITTLE INTEREST OR PLEASURE IN DOING THINGS: NOT AT ALL
SUM OF ALL RESPONSES TO PHQ9 QUESTIONS 1 AND 2: 0
2. FEELING DOWN, DEPRESSED, IRRITABLE, OR HOPELESS: NOT AT ALL

## 2019-05-27 NOTE — H&P
Hospital Medicine History & Physical Note    Date of Service  5/27/2019    Primary Care Physician  No primary care provider on file.    Consultants  None    Code Status  Full    Chief Complaint  Chief Complaint   Patient presents with   • Leg Swelling     Pt has two large, year old, non-healing wounds on the anterior and medial side of his left lower leg. Left leg noted to be swollen, red and draining serosanguinous fluid. Pain rated 10/10       History of Presenting Illness  63 y.o. male who presented on 5/26/2019 with leg swelling with weeping wound.  This is a middle-aged gentleman who unfortunately is homeless and has not been able to find a bed at any of the shelters therefore he has been staying on the streets.  He was diagnosed with necrotizing fasciitis of the left lower extremity in May 2018 at [facility] and had undergone treatment by his report.  He was admitted to our facility in November 2018 for nonhealing wounds and had been started on antibiotic therapy would left the hospital AGAINST MEDICAL ADVICE.  He states that since then, he has not been followed by wound care nurse.  Over the past month, he is developed redness and swelling of his lower extremity, worse around the medial wound which is poorly healing and a lateral wound which is weeping.  He denies any fevers, chills, nausea or vomiting.  He denies any headache, chest pain, shortness of breath, abdominal pain, diarrhea or dysuria.    Review of Systems  Review of Systems   Constitutional: Negative for chills and fever.   HENT: Negative for congestion and sore throat.    Eyes: Negative for photophobia.   Respiratory: Negative for cough, shortness of breath and wheezing.    Cardiovascular: Negative for chest pain and palpitations.   Gastrointestinal: Negative for abdominal pain, diarrhea, nausea and vomiting.   Genitourinary: Negative for dysuria.   Musculoskeletal: Negative for myalgias.        Left leg swelling, warm and red   Skin:  Negative.    Neurological: Negative for dizziness, tingling, focal weakness and headaches.   Psychiatric/Behavioral: Negative for depression and suicidal ideas.       Past Medical History  Past Medical History:   Diagnosis Date   • Psychiatric problem    • Restless leg    • Tobacco use        Surgical History  Past Surgical History:   Procedure Laterality Date   • ANKLE ORIF Right        Family History  Family History   Problem Relation Age of Onset   • Heart Disease Mother    • No Known Problems Father    • Cancer Neg Hx    • Diabetes Neg Hx    • Stroke Neg Hx        Social History  Social History   Substance Use Topics   • Smoking status: Light Tobacco Smoker     Years: 40.00     Types: Cigarettes   • Smokeless tobacco: Never Used      Comment: 1 ppd until few years ago   • Alcohol use Yes      Comment: occ       Allergies  No Known Allergies    Medications  No current facility-administered medications on file prior to encounter.      No current outpatient prescriptions on file prior to encounter.       Physical Exam  Hemodynamics  Temp (24hrs), Av.9 °C (98.4 °F), Min:36.9 °C (98.4 °F), Max:36.9 °C (98.4 °F)   Temperature: 36.9 °C (98.4 °F)  Pulse  Av  Min: 95  Max: 95 Heart Rate (Monitored): 91  Blood Pressure: 142/82, NIBP: 143/75      Respiratory      Respiration: (!) 24, Pulse Oximetry: 94 %             Physical Exam  Capillary refill less than 3 seconds, distal pulses intact    Laboratory:  Recent Labs      19   WBC  5.8   RBC  3.69*   HEMOGLOBIN  9.5*   HEMATOCRIT  30.9*   MCV  83.7   MCH  25.7*   MCHC  30.7*   RDW  51.0*   PLATELETCT  455*   MPV  8.7*     Recent Labs      19   SODIUM  139   POTASSIUM  4.3   CHLORIDE  105   CO2  25   GLUCOSE  100*   BUN  14   CREATININE  0.77   CALCIUM  8.8     Recent Labs      197   ALTSGPT  26   ASTSGOT  25   ALKPHOSPHAT  99   TBILIRUBIN  0.2   GLUCOSE  100*                 No results found for:  TROPONINI    Imaging  Dx-foot-complete 3+ Left    Result Date: 5/26/2019 5/26/2019 11:03 PM HISTORY/REASON FOR EXAM:  Left leg and foot erythema, pain and swelling with nonhealing wounds. TECHNIQUE/EXAM DESCRIPTION AND NUMBER OF VIEWS: 2 views of the LEFT foot. COMPARISON:  Left tibia and fibula same time FINDINGS: There is mild diffuse soft tissue swelling. There is no soft tissue gas. There is no evidence of displaced fracture or dislocation. There is postoperative change involving the distal tibia. There are old fractures of the distal tibia and fibula. There is atherosclerosis. There is no periostitis to suggest osteomyelitis.     1.  There is mild diffuse swelling of the left foot without soft tissue gas or evidence of osteomyelitis.    Dx-tibia And Fibula Left    Result Date: 5/26/2019 5/26/2019 11:03 PM HISTORY/REASON FOR EXAM:  Nonhealing wounds of the entire left lower leg. Previous distal tibia and fibular fracture, status post ORIF TECHNIQUE/EXAM DESCRIPTION AND NUMBER OF VIEWS:  4 views of the LEFT tibia and fibula. COMPARISON: None FINDINGS: There is no evidence of acute fracture involving the tibia or fibula. The patient is status post ORIF of the distal left tibial fracture which appears healed with some associated deformity. There is also a healed distal fibular fracture. There is no soft tissue gas or foreign body. There is no evidence of osteomyelitis.     1.  There are healed fractures of the distal right tibia and fibula with postoperative changes of the tibia. 2.  There is no evidence of osteomyelitis.    Dx-femur-2+ Left    Result Date: 5/26/2019 5/26/2019 11:02 PM HISTORY/REASON FOR EXAM:  Multiple nonhealing wounds with left entire leg erythema, pain and swelling.. . TECHNIQUE/EXAM DESCRIPTION AND NUMBER OF VIEWS:  4 views of the LEFT femur. COMPARISON: None FINDINGS: There is no acute fracture involving the left femur. There is no evidence of osteomyelitis. There is some smooth coarse  chronic appearing periosteal reaction along the posterior mid femoral diaphysis of no clinical significance and probably related to chronic venous stasis. There is atherosclerosis. There is a questionable old injury involving the left inferior obturator ring.     1.  No radiographic evidence of acute traumatic injury or osteomyelitis.        Assessment/Plan:  Anticipate that patient will need greater than 2 midnights for management of the discussed medical issues.    * Cellulitis   Assessment & Plan    Patient reports that he was diagnosed with necrotizing fasciitis of the left lower leg in May 2018 and was treated at [facility].  He was admitted to our facility in November 2018 for nonhealing wound and had been started on antibiotic therapy however he left the hospital AGAINST MEDICAL ADVICE before completing treatment.  He knows has cellulitis around the areas of his previous necrotizing fasciitis.  Fortunately, he has no leukocytosis, he is afebrile, and his vital signs are stable.  He does have elevated ESR and the lateral wound is moist and weeping.  He will be admitted to the hospital and started on empiric antibiotic therapy with IV Unasyn and doxycycline.  I requested wound care consultation.     Alcohol use   Assessment & Plan    Patient states that he drinks beer every so often, review of the medical records from his last hospitalization notes that he became agitated when he was on benzodiazepine therefore we will avoid this for now.  Thus far he is not showing any signs of alcohol withdrawal.  I will start him on a rally bag and then transition him to oral supplements.     Tobacco abuse   Assessment & Plan    This patient continues to smoke cigarettes. 3 minutes were spent counseling the patient in cessation techniques. Patient understands smoking increases risk factors for stroke and death. Patient is open to counseling.  The benefits of stopping were presented and nicotine replacement has been  ordered to assist with withdrawal from nicotine during hospitalization and we will continue to encourage cessation and discuss other supportive resources including community groups and prescription medications.         Prophylaxis: Heparin for DVT prophylaxis, no PPI indicated, bowel protocol as needed

## 2019-05-27 NOTE — WOUND TEAM
"[facility]  Inpatient Services  Initial Wound and Skin Care Evaluation    Admission Date:  5/26/2019   HPI, PMH, SH: Reviewed  Unit where seen by Wound Team: T332/01    HPI - pt presented to the ED for left leg swelling.  He has chronic wounds on this leg.  Pt was admitted for cellulitis    WOUND CONSULT RELATED TO:  Left leg wounds    SUBJECTIVE:  \"I fell on a trail about a year ago\"      Self Report / Pain Level:  'tender'     OBJECTIVE:  Pt in bed, left knee is contracted in flexion    WOUND TYPE, LOCATION, CHARACTERISTICS (Pressure ulcers: location, stage, POA or date identified)             Wound 10/24/18 Partial Thickness Wound Leg left leg, medial, lateral posterior (Active)   Wound Image    5/26/2019 10:57 PM   Site Assessment Yellow;Red 5/27/2019  8:00 AM   Meredith-wound Assessment Edema;Pink 5/27/2019  8:00 AM   Margins Defined edges, indurated 5/27/2019  8:00 AM   Measurements Medial 16x8x0.3  Post 20x8x0.3 lateral 5x4x0.2  Ant 4x4    Tunneling 0 cm 5/27/2019  8:00 AM   Undermining 0 cm 5/27/2019  8:00 AM   Closure Secondary intention 5/27/2019  8:00 AM   Drainage Amount Scant 5/27/2019  8:00 AM   Drainage Description Serosanguineous 5/27/2019  8:00 AM   Non-staged Wound Description Full thickness 5/27/2019  8:00 AM   Treatments Special tape;Cleansed 5/27/2019  8:00 AM   Cleansing Approved Wound Cleanser 5/27/2019  8:00 AM   Periwound Protectant Skin Protectant wipes to Periwound 5/27/2019  8:00 AM   Dressing Options Honey Colloid;Mepilex 5/27/2019  8:00 AM   Dressing Changed New 5/27/2019  8:00 AM   Dressing Change Frequency Every 72 hrs 5/27/2019  8:00 AM   NEXT Dressing Change  05/30/19 5/27/2019  8:00 AM   NEXT Weekly Photo (Inpatient Only) 06/03/19 5/27/2019  8:00 AM   WOUND NURSE ONLY - Odor Mild 5/27/2019  8:00 AM   WOUND NURSE ONLY - Pulses 1+;Left;DP;PT 5/27/2019  8:00 AM   WOUND NURSE ONLY - Exposed Structures None 5/27/2019  8:00 AM   WOUND NURSE ONLY - Tissue Type and Percentage " red and yellow 5/27/2019  8:00 AM   WOUND NURSE ONLY - Time Spent with Patient (mins) 60 5/27/2019  8:00 AM          Vascular: left ankle by hand held doppler    Dorsal Pedal pulses:  1+ multiphasic  Posterior tib pulses:  1+ multiphasic    KOSTAS:     nt    XR   DX-TIBIA AND FIBULA LEFT   Final Result       1.  There are healed fractures of the distal right tibia and fibula with postoperative changes of the tibia.   2.  There is no evidence of osteomyelitis.       DX-FEMUR-2+ LEFT   Final Result       1.  No radiographic evidence of acute traumatic injury or osteomyelitis.         Lab Values:    WBC:       WBC   Date/Time Value Ref Range Status   05/26/2019 11:47 PM 5.8 4.8 - 10.8 K/uL Final     AIC:      Lab Results   Component Value Date/Time    HBA1C 5.7 (H) 11/09/2018 06:30 PM         Culture:   Completed nt    INTERVENTIONS BY WOUND TEAM:  dresings removed, leg cleansed with damp cloth, wounds cleansed with wound cleanser, sharp debridement with tweezers to remove 5 cm2 dark yellow slough.  Placed honey colloid on wound beds and covered with mepilex, applied tubi G over knee and tube F on foot/leg    Dressing selection:  Honey colloid       Interdisciplinary consultation:  patient     EVALUATION:  Pt with chronic wounds on left leg that fail to heal due to lack of care and wound beds drying out.  Wounds will heal with a dressing that provides moisture balance.      Factors affecting wound healing:  Decrease ability to adhere to a POC for wound care, ETOH, Smoker  Goals:  Steady decrease in wound area and depth weekly     NURSING PLAN OF CARE ORDERS (X):    Dressing changes: See Dressing Care orders:   X  Skin care: See Skin Care orders:   Rectal tube care: See Rectal Tube Care orders:   Other orders:    RSKIN: CURRENT (X) ORDERED (O)  Q shift [place]:  X  Q shift pressure point assessments:  X  Pressure redistribution mattress        Waffle overlay  NANCY      Bariatric NANCY      Bariatric foam        Heel float  boots       Heels floated on pillows      Barrier wipes      Barrier Cream      Barrier paste      Sacral silicone dressing      Silicone O2 tubing      Anchorfast      Trach with Optifoam split foam       Waffle cushion      Rectal tube or BMS      Antifungal tx    Turn q 2 hours     Up to chair     Ambulate   PT/OT     Dietician      PO     TF   TPN   NPO   # days   Other       WOUND TEAM PLAN OF CARE (X):   NPWT change 3 x week:        Dressing changes by wound team:       Follow up as needed:     X  Other (explain):    Anticipated discharge plans (X):  SNF:     Pt will require ongoing wound care for large chronic wounds.       Home Care:           Outpatient Wound Center:            Self Care:            Other:

## 2019-05-27 NOTE — CARE PLAN
Problem: Safety  Goal: Will remain free from falls  Outcome: PROGRESSING AS EXPECTED  Educated on fall risk and ensured fall precautions in place. Bed in lowest position, call light in reach, bed alarm in place, room free of clutter, and proper assistance for patient to ambulate     Problem: Infection  Goal: Will remain free from infection  Outcome: PROGRESSING AS EXPECTED  Implemented hand hygiene and proper precautions before and after interacting with patient to prevent spread of germs

## 2019-05-27 NOTE — ASSESSMENT & PLAN NOTE
Patient reports that he was diagnosed with necrotizing fasciitis of the left lower leg in May 2018 and was treated at [facility].  Previous wound culture on 11/2018 showed MRSA with strep group A, blood cultures negative, wound culture on this admission + for MRSA, changed atb to bactrim on 5/31     Needs outpatient wound care.

## 2019-05-27 NOTE — CARE PLAN
Problem: Communication  Goal: The ability to communicate needs accurately and effectively will improve  Outcome: PROGRESSING AS EXPECTED  Communicates clearly.     Problem: Infection  Goal: Will remain free from infection  Outcome: PROGRESSING SLOWER THAN EXPECTED  Afebrile, on IV antibiotics.

## 2019-05-27 NOTE — ED PROVIDER NOTES
ED Provider Note    Scribed for Varinder You M.D. by Juan Villasenor. 5/26/2019  10:48 PM    Primary care provider: No primary care provider on file.  Means of arrival: wheel-chair  History obtained from: patient   History limited by: none    CHIEF COMPLAINT  Chief Complaint   Patient presents with   • Leg Swelling     Pt has two large, year old, non-healing wounds on the anterior and medial side of his left lower leg. Left leg noted to be swollen, red and draining serosanguinous fluid. Pain rated 10/10       HPI  Bola Varela is a 63 y.o. male who presents to the Emergency Department with complaints of persistent worsening leg swelling that first started a year ago. The patient states that he has two non healing wounds on the medial side of the left lower leg. He states that his pain is currently a 10 out of 10 in severity. The patient states that he was admitted to [facility] within the last week. He does not recall being discharged with antibiotics. The patient associates leg pain. He denies fever, and increased warmth. The patient denies any known medical allergies. The patient's tetanus status is up to date.    REVIEW OF SYSTEMS  Pertinent positives include: leg swelling, leg pain. Pertinent negatives include: fever, warmth. See history of present illness. All other systems are negative.     PAST MEDICAL HISTORY   has a past medical history of Psychiatric problem; Restless leg; and Tobacco use.    SURGICAL HISTORY   has a past surgical history that includes ankle orif (Right).    SOCIAL HISTORY  Social History   Substance Use Topics   • Smoking status: Light Tobacco Smoker     Years: 40.00     Types: Cigarettes   • Smokeless tobacco: Never Used      Comment: 1 ppd until few years ago   • Alcohol use Yes      Comment: occ      History   Drug Use No       FAMILY HISTORY  Family History   Problem Relation Age of Onset   • Heart Disease Mother    • No Known Problems Father    • Cancer Neg Hx    •  "Diabetes Neg Hx    • Stroke Neg Hx        CURRENT MEDICATIONS  Home Medications     Reviewed by Varinder Colindres R.N. (Registered Nurse) on 05/26/19 at 2206  Med List Status: Partial   Medication Last Dose Status        Patient Berhane Taking any Medications                       ALLERGIES  No Known Allergies    PHYSICAL EXAM  VITAL SIGNS: /82   Pulse 95   Temp 36.9 °C (98.4 °F) (Temporal)   Resp 16   Ht 1.803 m (5' 11\")   Wt 72.2 kg (159 lb 2.8 oz)   SpO2 98%   BMI 22.20 kg/m²     Constitutional: Alert in no apparent distress.  HENT: No signs of trauma, Bilateral external ears normal, Nose normal. Uvula midline.   Eyes: Pupils are equal and reactive, Conjunctiva normal, Non-icteric.   Neck: Normal range of motion, No tenderness, Supple, No stridor.   Lymphatic: No lymphadenopathy noted.   Cardiovascular: Regular rate and rhythm, no murmurs. 2+ peripheral pulses  Thorax & Lungs: Normal breath sounds, No respiratory distress, No wheezing, No chest tenderness.   Abdomen:  Soft, No tenderness, No peritoneal signs, No masses, No pulsatile masses.   Skin: Warm, Dry, No erythema, No rash.   Back: No bony tenderness, No CVA tenderness.   Extremities: Intact distal pulses, No edema, No tenderness, No cyanosis.  Musculoskeletal:  Increased warmth on the left lower leg, No crepitus, large 15 by 15 cm wound that wraps along the posterior aspect of his left leg. 2+ pitting edema present. Healing laceration that is L shaped on the anterior shin and several blisters on his lower foot that are present   Neurologic: Alert , Normal motor function, Normal sensory function, No focal deficits noted.   Psychiatric: Affect normal, Judgment normal, Mood normal.    DIAGNOSTIC STUDIES / PROCEDURES    LABS  Labs Reviewed   CBC WITH DIFFERENTIAL - Abnormal; Notable for the following:        Result Value    RBC 3.69 (*)     Hemoglobin 9.5 (*)     Hematocrit 30.9 (*)     MCH 25.7 (*)     MCHC 30.7 (*)     RDW 51.0 (*)     Platelet " "Count 455 (*)     MPV 8.7 (*)     Monocytes 14.40 (*)     All other components within normal limits   COMP METABOLIC PANEL - Abnormal; Notable for the following:     Glucose 100 (*)     All other components within normal limits   CRP QUANTITIVE (NON-CARDIAC) - Abnormal; Notable for the following:     Stat C-Reactive Protein 5.04 (*)     All other components within normal limits   LACTIC ACID   BLOOD CULTURE    Narrative:     1 of 2 for Blood Culture x 2 sites order. Per Hospital  Policy: Only change Specimen Src: to \"Line\" if specified by  physician order.   BLOOD CULTURE    Narrative:     2 of 2 blood culture x2  Sites order. Per Hospital Policy:  Only change Specimen Src: to \"Line\" if specified by physician  order.   URINALYSIS   ESTIMATED GFR   LACTIC ACID      All labs reviewed by me.    RADIOLOGY  DX-FOOT-COMPLETE 3+ LEFT   Final Result      1.  There is mild diffuse swelling of the left foot without soft tissue gas or evidence of osteomyelitis.      DX-TIBIA AND FIBULA LEFT   Final Result      1.  There are healed fractures of the distal right tibia and fibula with postoperative changes of the tibia.   2.  There is no evidence of osteomyelitis.      DX-FEMUR-2+ LEFT   Final Result      1.  No radiographic evidence of acute traumatic injury or osteomyelitis.        The radiologist's interpretation of all radiological studies have been reviewed by me.    COURSE & MEDICAL DECISION MAKING  Nursing notes, VS, PMSFHx reviewed in chart.    63 y.o. male p/w chief complaint of leg swelling.    10:48 PM Patient seen and examined at bedside.      The differential diagnoses include but are not limited to:     Hx and PE c/w cellulitis  Pt w/ no crepitus to suggest concern for Necrotizing fascitis at this time and LRINEC=2  No xr e/o osteonecrosis or retained foreign body  Exam not c/w absccess at this time, no area of flutuance on exam or obvious fluid collection  Plan admit to the hospital on antibx    12:48 AM- After " receiving concerning study results, the hospitalist has been paged for admission.    1:03 AM- I spoke with the hospitalist on call regarding the pertinent findings of the patient's case. They accept the patient for admission at this time.    Intravenous fluids administered for not appropriate for PO.  Patient not appropriate for oral rehydration, as surgical process needs to be ruled out before trial of oral rehydration.   On repeat evaluation, improved sx.  Pt w/ positive fluid response.      DISPOSITION:  Patient will be admitted to the hospitalist in guarded condition.    FINAL IMPRESSION  1. Cellulitis of left lower extremity          Juan ODOM (Maral), am scribing for, and in the presence of, Varinder You M.D..    Electronically signed by: Juan Villasenor (Maral), 5/26/2019    IVarinder M.D. personally performed the services described in this documentation, as scribed by Juan Villasenor in my presence, and it is both accurate and complete.    The note accurately reflects work and decisions made by me.  Varinder You  5/27/2019  1:19 AM

## 2019-05-27 NOTE — ASSESSMENT & PLAN NOTE
Continue monitoring for withdrawal.   Doing much better, CIWA score is low, he is off retrains.   Withdrawal resolving.

## 2019-05-27 NOTE — ED NOTES
Pt resting on cart, no s/s of distress. Vitals stable, call light within reach, cart low and locked.

## 2019-05-27 NOTE — ED TRIAGE NOTES
"Bola Varela  63 y.o. male  Chief Complaint   Patient presents with   • Leg Swelling     Pt has two large, year old, non-healing wounds on the anterior and medial side of his left lower leg. Left leg noted to be swollen, red and draining serosanguinous fluid. Pain rated 10/10     Pt brought in by EMS, and wheeled to triage for above complaint.    Pt states he was seen at [facility] earlier today and did not receive care for his wounds.     Pt is alert and oriented, speaking in full sentences, follows commands and responds appropriately to questions. NAD. Resp are even and unlabored.     Pt returned to lobby, educated on triage process, and to inform staff of any changes or concerns.    Blood Pressure: 142/82, Pulse: 95, Respiration: 16, Temperature: 36.9 °C (98.4 °F), Height: 180.3 cm (5' 11\"), Weight: 72.2 kg (159 lb 2.8 oz), BMI (Calculated): 22.2, BSA (Calculated): 1.9, Pulse Oximetry: 98 %, O2 Delivery: None (Room Air)    "

## 2019-05-27 NOTE — PROGRESS NOTES
Patient was seen at bedside and chart was reviewed. Please see Dr. Kumar's note for further details.    62 yo homeless man with tobacco use who presented with worsening left leg wound. He was recently admitted at [facility] for necrotizing fasciitis and treated for this. He has also been hospitalized here, left AMA, previously. He has not had antibiotics or wound care outpatient.   XRs here showed healing tib-fib fx, left foot swelling without signs of osteomyelitis.   Continue unasyn and doxy. Wound care consulted. Blood cx pending.  He says his pain is controlled.  Does not want to return to shelter, social work consulted.

## 2019-05-28 LAB
ANION GAP SERPL CALC-SCNC: 8 MMOL/L (ref 0–11.9)
BASOPHILS # BLD AUTO: 1.3 % (ref 0–1.8)
BASOPHILS # BLD: 0.07 K/UL (ref 0–0.12)
BUN SERPL-MCNC: 11 MG/DL (ref 8–22)
CALCIUM SERPL-MCNC: 8.5 MG/DL (ref 8.5–10.5)
CHLORIDE SERPL-SCNC: 106 MMOL/L (ref 96–112)
CO2 SERPL-SCNC: 24 MMOL/L (ref 20–33)
CREAT SERPL-MCNC: 0.89 MG/DL (ref 0.5–1.4)
EOSINOPHIL # BLD AUTO: 0.14 K/UL (ref 0–0.51)
EOSINOPHIL NFR BLD: 2.7 % (ref 0–6.9)
ERYTHROCYTE [DISTWIDTH] IN BLOOD BY AUTOMATED COUNT: 52 FL (ref 35.9–50)
GLUCOSE SERPL-MCNC: 96 MG/DL (ref 65–99)
HCT VFR BLD AUTO: 29.5 % (ref 42–52)
HGB BLD-MCNC: 9 G/DL (ref 14–18)
IMM GRANULOCYTES # BLD AUTO: 0.03 K/UL (ref 0–0.11)
IMM GRANULOCYTES NFR BLD AUTO: 0.6 % (ref 0–0.9)
LYMPHOCYTES # BLD AUTO: 1.35 K/UL (ref 1–4.8)
LYMPHOCYTES NFR BLD: 25.9 % (ref 22–41)
MCH RBC QN AUTO: 25.6 PG (ref 27–33)
MCHC RBC AUTO-ENTMCNC: 30.5 G/DL (ref 33.7–35.3)
MCV RBC AUTO: 84 FL (ref 81.4–97.8)
MONOCYTES # BLD AUTO: 0.77 K/UL (ref 0–0.85)
MONOCYTES NFR BLD AUTO: 14.8 % (ref 0–13.4)
NEUTROPHILS # BLD AUTO: 2.86 K/UL (ref 1.82–7.42)
NEUTROPHILS NFR BLD: 54.7 % (ref 44–72)
NRBC # BLD AUTO: 0 K/UL
NRBC BLD-RTO: 0 /100 WBC
PLATELET # BLD AUTO: 437 K/UL (ref 164–446)
PMV BLD AUTO: 9 FL (ref 9–12.9)
POTASSIUM SERPL-SCNC: 3.8 MMOL/L (ref 3.6–5.5)
RBC # BLD AUTO: 3.51 M/UL (ref 4.7–6.1)
SODIUM SERPL-SCNC: 138 MMOL/L (ref 135–145)
WBC # BLD AUTO: 5.2 K/UL (ref 4.8–10.8)

## 2019-05-28 PROCEDURE — 700111 HCHG RX REV CODE 636 W/ 250 OVERRIDE (IP): Performed by: INTERNAL MEDICINE

## 2019-05-28 PROCEDURE — 700105 HCHG RX REV CODE 258: Performed by: HOSPITALIST

## 2019-05-28 PROCEDURE — 36415 COLL VENOUS BLD VENIPUNCTURE: CPT

## 2019-05-28 PROCEDURE — 700102 HCHG RX REV CODE 250 W/ 637 OVERRIDE(OP): Performed by: HOSPITALIST

## 2019-05-28 PROCEDURE — 87205 SMEAR GRAM STAIN: CPT

## 2019-05-28 PROCEDURE — 87077 CULTURE AEROBIC IDENTIFY: CPT

## 2019-05-28 PROCEDURE — 770021 HCHG ROOM/CARE - ISO PRIVATE

## 2019-05-28 PROCEDURE — 700101 HCHG RX REV CODE 250: Performed by: HOSPITALIST

## 2019-05-28 PROCEDURE — 87070 CULTURE OTHR SPECIMN AEROBIC: CPT

## 2019-05-28 PROCEDURE — 87186 SC STD MICRODIL/AGAR DIL: CPT

## 2019-05-28 PROCEDURE — 99232 SBSQ HOSP IP/OBS MODERATE 35: CPT | Performed by: HOSPITALIST

## 2019-05-28 PROCEDURE — 700111 HCHG RX REV CODE 636 W/ 250 OVERRIDE (IP): Performed by: HOSPITALIST

## 2019-05-28 PROCEDURE — 85025 COMPLETE CBC W/AUTO DIFF WBC: CPT

## 2019-05-28 PROCEDURE — 80048 BASIC METABOLIC PNL TOTAL CA: CPT

## 2019-05-28 PROCEDURE — 770006 HCHG ROOM/CARE - MED/SURG/GYN SEMI*

## 2019-05-28 PROCEDURE — A9270 NON-COVERED ITEM OR SERVICE: HCPCS | Performed by: HOSPITALIST

## 2019-05-28 RX ORDER — M-VIT,TX,IRON,MINS/CALC/FOLIC 27MG-0.4MG
1 TABLET ORAL DAILY
Status: DISCONTINUED | OUTPATIENT
Start: 2019-05-29 | End: 2019-06-03 | Stop reason: HOSPADM

## 2019-05-28 RX ORDER — ASCORBIC ACID 500 MG
500 TABLET ORAL DAILY
Status: DISCONTINUED | OUTPATIENT
Start: 2019-05-28 | End: 2019-06-03 | Stop reason: HOSPADM

## 2019-05-28 RX ORDER — DOXYCYCLINE 100 MG/1
100 TABLET ORAL EVERY 12 HOURS
Status: DISCONTINUED | OUTPATIENT
Start: 2019-05-28 | End: 2019-05-31

## 2019-05-28 RX ADMIN — LORAZEPAM 1.5 MG: 2 INJECTION INTRAMUSCULAR; INTRAVENOUS at 23:36

## 2019-05-28 RX ADMIN — LORAZEPAM 0.5 MG: 2 INJECTION INTRAMUSCULAR; INTRAVENOUS at 03:52

## 2019-05-28 RX ADMIN — DOXYCYCLINE 100 MG: 100 INJECTION, POWDER, LYOPHILIZED, FOR SOLUTION INTRAVENOUS at 06:23

## 2019-05-28 RX ADMIN — AMPICILLIN SODIUM AND SULBACTAM SODIUM 3 G: 2; 1 INJECTION, POWDER, FOR SOLUTION INTRAMUSCULAR; INTRAVENOUS at 17:16

## 2019-05-28 RX ADMIN — LORAZEPAM 1 MG: 2 INJECTION INTRAMUSCULAR; INTRAVENOUS at 21:29

## 2019-05-28 RX ADMIN — OXYCODONE HYDROCHLORIDE AND ACETAMINOPHEN 500 MG: 500 TABLET ORAL at 17:17

## 2019-05-28 RX ADMIN — AMPICILLIN SODIUM AND SULBACTAM SODIUM 3 G: 2; 1 INJECTION, POWDER, FOR SOLUTION INTRAMUSCULAR; INTRAVENOUS at 11:27

## 2019-05-28 RX ADMIN — AMPICILLIN SODIUM AND SULBACTAM SODIUM 3 G: 2; 1 INJECTION, POWDER, FOR SOLUTION INTRAMUSCULAR; INTRAVENOUS at 05:06

## 2019-05-28 RX ADMIN — LORAZEPAM 1 MG: 2 INJECTION INTRAMUSCULAR; INTRAVENOUS at 10:17

## 2019-05-28 RX ADMIN — DOXYCYCLINE 100 MG: 100 TABLET, FILM COATED ORAL at 17:17

## 2019-05-28 RX ADMIN — ENOXAPARIN SODIUM 40 MG: 100 INJECTION SUBCUTANEOUS at 05:05

## 2019-05-28 RX ADMIN — LORAZEPAM 1 MG: 2 INJECTION INTRAMUSCULAR; INTRAVENOUS at 17:17

## 2019-05-28 RX ADMIN — AMPICILLIN SODIUM AND SULBACTAM SODIUM 3 G: 2; 1 INJECTION, POWDER, FOR SOLUTION INTRAMUSCULAR; INTRAVENOUS at 23:12

## 2019-05-28 ASSESSMENT — ENCOUNTER SYMPTOMS
BLURRED VISION: 0
DIZZINESS: 0
BRUISES/BLEEDS EASILY: 0
MYALGIAS: 0
FEVER: 0
CHILLS: 0
SINUS PAIN: 0
HEARTBURN: 0
PALPITATIONS: 0
COUGH: 0
HEADACHES: 0
HEMOPTYSIS: 0
DEPRESSION: 0
NECK PAIN: 0

## 2019-05-28 ASSESSMENT — LIFESTYLE VARIABLES
AUDITORY DISTURBANCES: NOT PRESENT
VISUAL DISTURBANCES: NOT PRESENT
NAUSEA AND VOMITING: NO NAUSEA AND NO VOMITING
TREMOR: *
ANXIETY: *
ORIENTATION AND CLOUDING OF SENSORIUM: CANNOT DO SERIAL ADDITIONS OR IS UNCERTAIN ABOUT DATE
TREMOR: *
TOTAL SCORE: 14
NAUSEA AND VOMITING: NO NAUSEA AND NO VOMITING
PAROXYSMAL SWEATS: BARELY PERCEPTIBLE SWEATING. PALMS MOIST
AGITATION: *
AUDITORY DISTURBANCES: NOT PRESENT
TREMOR: *
VISUAL DISTURBANCES: NOT PRESENT
ANXIETY: *
TOTAL SCORE: 3
TREMOR: TREMOR NOT VISIBLE BUT CAN BE FELT, FINGERTIP TO FINGERTIP
AGITATION: *
AUDITORY DISTURBANCES: NOT PRESENT
NAUSEA AND VOMITING: NO NAUSEA AND NO VOMITING
VISUAL DISTURBANCES: NOT PRESENT
TOTAL SCORE: 14
VISUAL DISTURBANCES: NOT PRESENT
ANXIETY: *
PAROXYSMAL SWEATS: *
TREMOR: *
AUDITORY DISTURBANCES: NOT PRESENT
NAUSEA AND VOMITING: NO NAUSEA AND NO VOMITING
HEADACHE, FULLNESS IN HEAD: NOT PRESENT
ANXIETY: NO ANXIETY (AT EASE)
TOTAL SCORE: 12
TOTAL SCORE: VERY MILD ITCHING, PINS AND NEEDLES SENSATION, BURNING OR NUMBNESS
PAROXYSMAL SWEATS: BARELY PERCEPTIBLE SWEATING. PALMS MOIST
AUDITORY DISTURBANCES: NOT PRESENT
ANXIETY: *
TREMOR: *
AUDITORY DISTURBANCES: NOT PRESENT
TREMOR: *
ORIENTATION AND CLOUDING OF SENSORIUM: ORIENTED AND CAN DO SERIAL ADDITIONS
HEADACHE, FULLNESS IN HEAD: NOT PRESENT
PAROXYSMAL SWEATS: *
AGITATION: *
NAUSEA AND VOMITING: NO NAUSEA AND NO VOMITING
ORIENTATION AND CLOUDING OF SENSORIUM: CANNOT DO SERIAL ADDITIONS OR IS UNCERTAIN ABOUT DATE
AGITATION: *
VISUAL DISTURBANCES: NOT PRESENT
AGITATION: MODERATELY FIDGETY AND RESTLESS
PAROXYSMAL SWEATS: BARELY PERCEPTIBLE SWEATING. PALMS MOIST
TOTAL SCORE: 15
PAROXYSMAL SWEATS: NO SWEAT VISIBLE
TOTAL SCORE: 9
VISUAL DISTURBANCES: NOT PRESENT
HEADACHE, FULLNESS IN HEAD: MILD
AUDITORY DISTURBANCES: NOT PRESENT
HEADACHE, FULLNESS IN HEAD: NOT PRESENT
ORIENTATION AND CLOUDING OF SENSORIUM: ORIENTED AND CAN DO SERIAL ADDITIONS
NAUSEA AND VOMITING: NO NAUSEA AND NO VOMITING
NAUSEA AND VOMITING: NO NAUSEA AND NO VOMITING
TOTAL SCORE: 10
ANXIETY: *
ORIENTATION AND CLOUDING OF SENSORIUM: CANNOT DO SERIAL ADDITIONS OR IS UNCERTAIN ABOUT DATE
AGITATION: NORMAL ACTIVITY
ANXIETY: *
HEADACHE, FULLNESS IN HEAD: MILD
PAROXYSMAL SWEATS: *
TOTAL SCORE: VERY MILD ITCHING, PINS AND NEEDLES SENSATION, BURNING OR NUMBNESS
AGITATION: *
VISUAL DISTURBANCES: NOT PRESENT

## 2019-05-28 NOTE — PROGRESS NOTES
[facility] Medicine Daily Progress Note    Date of Service  5/28/2019    Chief Complaint  63 y.o. male admitted 5/26/2019 with left lower ext wound      Interval Problem Update  Patient is resting in bed, no new complains, not in distress, pain is stable, no fever or chills.     Consultants/Specialty  none    Code Status  Full code    Disposition  Tbd.     Review of Systems  Review of Systems   Constitutional: Negative for chills and fever.   HENT: Negative for congestion and sinus pain.    Eyes: Negative for blurred vision.   Respiratory: Negative for cough and hemoptysis.    Cardiovascular: Negative for chest pain and palpitations.   Gastrointestinal: Negative for heartburn.   Genitourinary: Negative for dysuria and urgency.   Musculoskeletal: Negative for myalgias and neck pain.   Skin: Negative for itching.   Neurological: Negative for dizziness and headaches.   Endo/Heme/Allergies: Does not bruise/bleed easily.   Psychiatric/Behavioral: Negative for depression.        Physical Exam  Temp:  [36.2 °C (97.1 °F)-37 °C (98.6 °F)] 37 °C (98.6 °F)  Pulse:  [72-89] 76  Resp:  [16-18] 17  BP: (132-150)/(71-79) 141/79  SpO2:  [93 %-96 %] 95 %    Physical Exam   Constitutional: He is oriented to person, place, and time. He appears well-nourished. No distress.   HENT:   Head: Normocephalic and atraumatic.   Mouth/Throat: No oropharyngeal exudate.   Eyes: Conjunctivae are normal. No scleral icterus.   Neck: Normal range of motion. Neck supple. No JVD present.   Cardiovascular: Normal rate and regular rhythm.  Exam reveals no gallop.    Pulmonary/Chest: Effort normal and breath sounds normal. No stridor. No respiratory distress. He exhibits no tenderness.   Abdominal: Soft. Bowel sounds are normal. He exhibits no distension. There is no tenderness.   Musculoskeletal: Normal range of motion. He exhibits no edema.   Large wound on left lower ext medial aspect.    Lymphadenopathy:     He has no cervical adenopathy.    Neurological: He is alert and oriented to person, place, and time. He exhibits normal muscle tone.   Skin: Skin is dry. No erythema.   Psychiatric: He has a normal mood and affect.   Nursing note and vitals reviewed.      Fluids    Intake/Output Summary (Last 24 hours) at 05/28/19 1641  Last data filed at 05/28/19 0957   Gross per 24 hour   Intake              120 ml   Output                0 ml   Net              120 ml       Laboratory  Recent Labs      05/26/19 2347 05/28/19   0257   WBC  5.8  5.2   RBC  3.69*  3.51*   HEMOGLOBIN  9.5*  9.0*   HEMATOCRIT  30.9*  29.5*   MCV  83.7  84.0   MCH  25.7*  25.6*   MCHC  30.7*  30.5*   RDW  51.0*  52.0*   PLATELETCT  455*  437   MPV  8.7*  9.0     Recent Labs      05/26/19 2347 05/28/19 0257   SODIUM  139  138   POTASSIUM  4.3  3.8   CHLORIDE  105  106   CO2  25  24   GLUCOSE  100*  96   BUN  14  11   CREATININE  0.77  0.89   CALCIUM  8.8  8.5                   Imaging  DX-FOOT-COMPLETE 3+ LEFT   Final Result      1.  There is mild diffuse swelling of the left foot without soft tissue gas or evidence of osteomyelitis.      DX-TIBIA AND FIBULA LEFT   Final Result      1.  There are healed fractures of the distal right tibia and fibula with postoperative changes of the tibia.   2.  There is no evidence of osteomyelitis.      DX-FEMUR-2+ LEFT   Final Result      1.  No radiographic evidence of acute traumatic injury or osteomyelitis.           Assessment/Plan  * Cellulitis   Assessment & Plan    Patient reports that he was diagnosed with necrotizing fasciitis of the left lower leg in May 2018 and was treated at [facility], continue iv atb, wound cx, wound care consult.        Alcohol use   Assessment & Plan    Continue monitoring for withdrawal.      Tobacco abuse   Assessment & Plan    Needs to quit.           VTE prophylaxis: lovenox.

## 2019-05-28 NOTE — PROGRESS NOTES
"Walked by room and found patient very agitated punching himself in the head and swearing while standing at edge of bed.  When asked what was wrong the patient yelled \"I just want to fucking walk around and I am hooked to all this shit!\"  Patient saline locked and helped to bathroom and back.  RN notified and instructed to call doctor.  "

## 2019-05-28 NOTE — PROGRESS NOTES
Pt was found hitting himself in the head. This RN called the on call hospitalist and had CIWA started.

## 2019-05-28 NOTE — PROGRESS NOTES
Bedside shift report received from night RN.  Assumed care at 0715, patient seleeping in bed, belongings and call light within reach, PIV assessed CDI, dressing drain visualized CDI. Needs met at this time.    Reviewed current POC with family present. POC review with CNA including vital sign frequency/drains/mobility.  Labs, notes, orders reviewed at this time.

## 2019-05-28 NOTE — CARE PLAN
Problem: Psychosocial Needs:  Goal: Level of anxiety will decrease  Outcome: PROGRESSING SLOWER THAN EXPECTED  Pt was found in room hitting himself in the head. CIWA was started on the pt.

## 2019-05-28 NOTE — PROGRESS NOTES
"Patient out of bed with bed alarm going off, when RN entered room patient was throwing breakfast tray saying \"I just moved this and now everything is going off.\"  RN educated patient multiple times so far this shift that he needs to push the call light if he wants to get up.  Patient verbalizes understanding but still does not call.  Patient began to hit himself on the head, RN had to physically stop this action.  Patient seems to have short term memory loss and does not remember RN educating him on call light system, also asking for multiple cup of coffee after already having it.  "

## 2019-05-28 NOTE — CARE PLAN
Problem: Pain Management  Goal: Pain level will decrease to patient's comfort goal  Outcome: PROGRESSING AS EXPECTED  Pt is in a comfortable pain range with just his tylenol occassionally.

## 2019-05-29 LAB
ANION GAP SERPL CALC-SCNC: 8 MMOL/L (ref 0–11.9)
BASOPHILS # BLD AUTO: 1.2 % (ref 0–1.8)
BASOPHILS # BLD: 0.07 K/UL (ref 0–0.12)
BUN SERPL-MCNC: 9 MG/DL (ref 8–22)
CALCIUM SERPL-MCNC: 8.2 MG/DL (ref 8.5–10.5)
CHLORIDE SERPL-SCNC: 105 MMOL/L (ref 96–112)
CO2 SERPL-SCNC: 25 MMOL/L (ref 20–33)
CREAT SERPL-MCNC: 0.77 MG/DL (ref 0.5–1.4)
CRP SERPL HS-MCNC: 2.82 MG/DL (ref 0–0.75)
EOSINOPHIL # BLD AUTO: 0.14 K/UL (ref 0–0.51)
EOSINOPHIL NFR BLD: 2.3 % (ref 0–6.9)
ERYTHROCYTE [DISTWIDTH] IN BLOOD BY AUTOMATED COUNT: 49.3 FL (ref 35.9–50)
ERYTHROCYTE [SEDIMENTATION RATE] IN BLOOD BY WESTERGREN METHOD: 95 MM/HOUR (ref 0–20)
GLUCOSE SERPL-MCNC: 102 MG/DL (ref 65–99)
GRAM STN SPEC: NORMAL
HCT VFR BLD AUTO: 28.2 % (ref 42–52)
HGB BLD-MCNC: 8.8 G/DL (ref 14–18)
IMM GRANULOCYTES # BLD AUTO: 0.03 K/UL (ref 0–0.11)
IMM GRANULOCYTES NFR BLD AUTO: 0.5 % (ref 0–0.9)
LYMPHOCYTES # BLD AUTO: 1.41 K/UL (ref 1–4.8)
LYMPHOCYTES NFR BLD: 23.3 % (ref 22–41)
MCH RBC QN AUTO: 25.5 PG (ref 27–33)
MCHC RBC AUTO-ENTMCNC: 31.2 G/DL (ref 33.7–35.3)
MCV RBC AUTO: 81.7 FL (ref 81.4–97.8)
MONOCYTES # BLD AUTO: 0.9 K/UL (ref 0–0.85)
MONOCYTES NFR BLD AUTO: 14.9 % (ref 0–13.4)
NEUTROPHILS # BLD AUTO: 3.5 K/UL (ref 1.82–7.42)
NEUTROPHILS NFR BLD: 57.8 % (ref 44–72)
NRBC # BLD AUTO: 0 K/UL
NRBC BLD-RTO: 0 /100 WBC
PLATELET # BLD AUTO: 493 K/UL (ref 164–446)
PMV BLD AUTO: 8.7 FL (ref 9–12.9)
POTASSIUM SERPL-SCNC: 3.8 MMOL/L (ref 3.6–5.5)
RBC # BLD AUTO: 3.45 M/UL (ref 4.7–6.1)
SIGNIFICANT IND 70042: NORMAL
SITE SITE: NORMAL
SODIUM SERPL-SCNC: 138 MMOL/L (ref 135–145)
SOURCE SOURCE: NORMAL
WBC # BLD AUTO: 6.1 K/UL (ref 4.8–10.8)

## 2019-05-29 PROCEDURE — 85025 COMPLETE CBC W/AUTO DIFF WBC: CPT

## 2019-05-29 PROCEDURE — 99232 SBSQ HOSP IP/OBS MODERATE 35: CPT | Performed by: HOSPITALIST

## 2019-05-29 PROCEDURE — 770021 HCHG ROOM/CARE - ISO PRIVATE

## 2019-05-29 PROCEDURE — 700111 HCHG RX REV CODE 636 W/ 250 OVERRIDE (IP): Performed by: INTERNAL MEDICINE

## 2019-05-29 PROCEDURE — 770006 HCHG ROOM/CARE - MED/SURG/GYN SEMI*

## 2019-05-29 PROCEDURE — A9270 NON-COVERED ITEM OR SERVICE: HCPCS | Performed by: HOSPITALIST

## 2019-05-29 PROCEDURE — 700102 HCHG RX REV CODE 250 W/ 637 OVERRIDE(OP): Performed by: HOSPITALIST

## 2019-05-29 PROCEDURE — 36415 COLL VENOUS BLD VENIPUNCTURE: CPT

## 2019-05-29 PROCEDURE — 86140 C-REACTIVE PROTEIN: CPT

## 2019-05-29 PROCEDURE — 80048 BASIC METABOLIC PNL TOTAL CA: CPT

## 2019-05-29 PROCEDURE — 700111 HCHG RX REV CODE 636 W/ 250 OVERRIDE (IP): Performed by: HOSPITALIST

## 2019-05-29 PROCEDURE — 700105 HCHG RX REV CODE 258: Performed by: HOSPITALIST

## 2019-05-29 PROCEDURE — 85652 RBC SED RATE AUTOMATED: CPT

## 2019-05-29 RX ORDER — AMOXICILLIN AND CLAVULANATE POTASSIUM 875; 125 MG/1; MG/1
1 TABLET, FILM COATED ORAL EVERY 12 HOURS
Status: DISCONTINUED | OUTPATIENT
Start: 2019-05-29 | End: 2019-05-31

## 2019-05-29 RX ADMIN — LORAZEPAM 1 MG: 2 INJECTION INTRAMUSCULAR; INTRAVENOUS at 21:41

## 2019-05-29 RX ADMIN — FOLIC ACID 1 MG: 1 TABLET ORAL at 05:14

## 2019-05-29 RX ADMIN — MULTIPLE VITAMINS W/ MINERALS TAB 1 TABLET: TAB at 05:14

## 2019-05-29 RX ADMIN — DOXYCYCLINE 100 MG: 100 TABLET, FILM COATED ORAL at 17:22

## 2019-05-29 RX ADMIN — NICOTINE 14 MG: 14 PATCH, EXTENDED RELEASE TRANSDERMAL at 05:14

## 2019-05-29 RX ADMIN — Medication 100 MG: at 05:13

## 2019-05-29 RX ADMIN — AMPICILLIN SODIUM AND SULBACTAM SODIUM 3 G: 2; 1 INJECTION, POWDER, FOR SOLUTION INTRAMUSCULAR; INTRAVENOUS at 05:14

## 2019-05-29 RX ADMIN — ACETAMINOPHEN 650 MG: 325 TABLET, FILM COATED ORAL at 12:58

## 2019-05-29 RX ADMIN — AMOXICILLIN AND CLAVULANATE POTASSIUM 1 TABLET: 875; 125 TABLET, FILM COATED ORAL at 20:11

## 2019-05-29 RX ADMIN — DOXYCYCLINE 100 MG: 100 TABLET, FILM COATED ORAL at 05:13

## 2019-05-29 RX ADMIN — LORAZEPAM 1.5 MG: 2 INJECTION INTRAMUSCULAR; INTRAVENOUS at 23:55

## 2019-05-29 RX ADMIN — AMPICILLIN SODIUM AND SULBACTAM SODIUM 3 G: 2; 1 INJECTION, POWDER, FOR SOLUTION INTRAMUSCULAR; INTRAVENOUS at 17:22

## 2019-05-29 RX ADMIN — SENNOSIDES,DOCUSATE SODIUM 2 TABLET: 8.6; 5 TABLET, FILM COATED ORAL at 05:13

## 2019-05-29 RX ADMIN — ENOXAPARIN SODIUM 40 MG: 100 INJECTION SUBCUTANEOUS at 05:21

## 2019-05-29 RX ADMIN — AMPICILLIN SODIUM AND SULBACTAM SODIUM 3 G: 2; 1 INJECTION, POWDER, FOR SOLUTION INTRAMUSCULAR; INTRAVENOUS at 12:58

## 2019-05-29 RX ADMIN — OXYCODONE HYDROCHLORIDE AND ACETAMINOPHEN 500 MG: 500 TABLET ORAL at 05:16

## 2019-05-29 RX ADMIN — SENNOSIDES,DOCUSATE SODIUM 2 TABLET: 8.6; 5 TABLET, FILM COATED ORAL at 17:22

## 2019-05-29 ASSESSMENT — PATIENT HEALTH QUESTIONNAIRE - PHQ9
1. LITTLE INTEREST OR PLEASURE IN DOING THINGS: NOT AT ALL
2. FEELING DOWN, DEPRESSED, IRRITABLE, OR HOPELESS: NOT AT ALL
1. LITTLE INTEREST OR PLEASURE IN DOING THINGS: NOT AT ALL
SUM OF ALL RESPONSES TO PHQ9 QUESTIONS 1 AND 2: 0
SUM OF ALL RESPONSES TO PHQ9 QUESTIONS 1 AND 2: 0
2. FEELING DOWN, DEPRESSED, IRRITABLE, OR HOPELESS: NOT AT ALL

## 2019-05-29 ASSESSMENT — LIFESTYLE VARIABLES
VISUAL DISTURBANCES: NOT PRESENT
NAUSEA AND VOMITING: NO NAUSEA AND NO VOMITING
TREMOR: NO TREMOR
TREMOR: *
AUDITORY DISTURBANCES: NOT PRESENT
AUDITORY DISTURBANCES: NOT PRESENT
PAROXYSMAL SWEATS: *
HEADACHE, FULLNESS IN HEAD: NOT PRESENT
AGITATION: SOMEWHAT MORE THAN NORMAL ACTIVITY
HEADACHE, FULLNESS IN HEAD: NOT PRESENT
AUDITORY DISTURBANCES: NOT PRESENT
NAUSEA AND VOMITING: NO NAUSEA AND NO VOMITING
AUDITORY DISTURBANCES: NOT PRESENT
TREMOR: *
TOTAL SCORE: 4
ANXIETY: MILDLY ANXIOUS
VISUAL DISTURBANCES: NOT PRESENT
ANXIETY: MILDLY ANXIOUS
HEADACHE, FULLNESS IN HEAD: NOT PRESENT
ORIENTATION AND CLOUDING OF SENSORIUM: ORIENTED AND CAN DO SERIAL ADDITIONS
AGITATION: MODERATELY FIDGETY AND RESTLESS
ORIENTATION AND CLOUDING OF SENSORIUM: ORIENTED AND CAN DO SERIAL ADDITIONS
AGITATION: MODERATELY FIDGETY AND RESTLESS
TOTAL SCORE: 4
AUDITORY DISTURBANCES: NOT PRESENT
ANXIETY: MILDLY ANXIOUS
ORIENTATION AND CLOUDING OF SENSORIUM: ORIENTED AND CAN DO SERIAL ADDITIONS
ANXIETY: MODERATELY ANXIOUS OR GUARDED, SO ANXIETY IS INFERRED
AGITATION: SOMEWHAT MORE THAN NORMAL ACTIVITY
AUDITORY DISTURBANCES: NOT PRESENT
HEADACHE, FULLNESS IN HEAD: NOT PRESENT
TOTAL SCORE: 13
AGITATION: PACES BACK AND FORTH DURING MOST OF THE INTERVIEW OR CONSTANTLY THRASHES ABOUT.
TOTAL SCORE: 16
ANXIETY: MILDLY ANXIOUS
NAUSEA AND VOMITING: NO NAUSEA AND NO VOMITING
PAROXYSMAL SWEATS: BARELY PERCEPTIBLE SWEATING. PALMS MOIST
PAROXYSMAL SWEATS: *
ORIENTATION AND CLOUDING OF SENSORIUM: ORIENTED AND CAN DO SERIAL ADDITIONS
AUDITORY DISTURBANCES: NOT PRESENT
HEADACHE, FULLNESS IN HEAD: MILD
HEADACHE, FULLNESS IN HEAD: NOT PRESENT
TREMOR: NO TREMOR
TOTAL SCORE: 4
PAROXYSMAL SWEATS: *
VISUAL DISTURBANCES: NOT PRESENT
ORIENTATION AND CLOUDING OF SENSORIUM: ORIENTED AND CAN DO SERIAL ADDITIONS
VISUAL DISTURBANCES: NOT PRESENT
ANXIETY: *
TREMOR: *
PAROXYSMAL SWEATS: *
TREMOR: NO TREMOR
AGITATION: SOMEWHAT MORE THAN NORMAL ACTIVITY
NAUSEA AND VOMITING: NO NAUSEA AND NO VOMITING
VISUAL DISTURBANCES: NOT PRESENT
ORIENTATION AND CLOUDING OF SENSORIUM: ORIENTED AND CAN DO SERIAL ADDITIONS
NAUSEA AND VOMITING: NO NAUSEA AND NO VOMITING
ANXIETY: *
TOTAL SCORE: 4
PAROXYSMAL SWEATS: BARELY PERCEPTIBLE SWEATING. PALMS MOIST
NAUSEA AND VOMITING: NO NAUSEA AND NO VOMITING
PAROXYSMAL SWEATS: BARELY PERCEPTIBLE SWEATING. PALMS MOIST
ORIENTATION AND CLOUDING OF SENSORIUM: ORIENTED AND CAN DO SERIAL ADDITIONS
NAUSEA AND VOMITING: NO NAUSEA AND NO VOMITING
VISUAL DISTURBANCES: NOT PRESENT
HEADACHE, FULLNESS IN HEAD: NOT PRESENT
AGITATION: SOMEWHAT MORE THAN NORMAL ACTIVITY
VISUAL DISTURBANCES: NOT PRESENT
TOTAL SCORE: 9
TREMOR: NO TREMOR

## 2019-05-29 ASSESSMENT — ENCOUNTER SYMPTOMS
COUGH: 0
DIZZINESS: 0
FEVER: 0
BLURRED VISION: 0
STRIDOR: 0
BRUISES/BLEEDS EASILY: 0
HEADACHES: 0
HEARTBURN: 0
BACK PAIN: 0
DEPRESSION: 0
MYALGIAS: 0

## 2019-05-29 NOTE — PROGRESS NOTES
0730 assumed care. Bedside report from EKATERINA Patel. Resting in bed and in no distress. Bed in low position, call light w/in reach. Strip bed alarm on.

## 2019-05-29 NOTE — PROGRESS NOTES
Pt remains non compliant with calling for assistance to get up OOB, pt verbalizes understanding but still does not lucien. Pt is reoriented to call light system and educated but still non compliant throughout shift.  Pt hits himself on the head and curses using foul language when being educated about not getting up without calling or being asked to use the urinal to void. Bed alarm in place, side rails up x4 for pt safety. Will continue to monitor.

## 2019-05-29 NOTE — CARE PLAN
Problem: Safety  Goal: Will remain free from falls  Outcome: PROGRESSING SLOWER THAN EXPECTED  Pt has remain free from falls thus far but remains non compliant in calling for assistance to get up OOB. Bed alarm in place and functioning correctly, pts room close to nurses station, frequent rounding.

## 2019-05-29 NOTE — CARE PLAN
Problem: Psychosocial Needs:  Goal: Level of anxiety will decrease  Outcome: PROGRESSING SLOWER THAN EXPECTED  Pt remains very anxious and restless, CIWA protocol in place, medication being administered when appropriate.

## 2019-05-30 PROBLEM — D64.9 ANEMIA: Status: ACTIVE | Noted: 2019-05-30

## 2019-05-30 PROCEDURE — 700111 HCHG RX REV CODE 636 W/ 250 OVERRIDE (IP): Performed by: INTERNAL MEDICINE

## 2019-05-30 PROCEDURE — A9270 NON-COVERED ITEM OR SERVICE: HCPCS | Performed by: HOSPITALIST

## 2019-05-30 PROCEDURE — 99232 SBSQ HOSP IP/OBS MODERATE 35: CPT | Performed by: HOSPITALIST

## 2019-05-30 PROCEDURE — 770006 HCHG ROOM/CARE - MED/SURG/GYN SEMI*

## 2019-05-30 PROCEDURE — 700102 HCHG RX REV CODE 250 W/ 637 OVERRIDE(OP): Performed by: HOSPITALIST

## 2019-05-30 PROCEDURE — 770021 HCHG ROOM/CARE - ISO PRIVATE

## 2019-05-30 PROCEDURE — A9270 NON-COVERED ITEM OR SERVICE: HCPCS | Performed by: INTERNAL MEDICINE

## 2019-05-30 PROCEDURE — 700102 HCHG RX REV CODE 250 W/ 637 OVERRIDE(OP): Performed by: INTERNAL MEDICINE

## 2019-05-30 RX ORDER — HALOPERIDOL 5 MG/ML
1 INJECTION INTRAMUSCULAR EVERY 4 HOURS PRN
Status: DISCONTINUED | OUTPATIENT
Start: 2019-05-30 | End: 2019-06-03 | Stop reason: HOSPADM

## 2019-05-30 RX ADMIN — LORAZEPAM 1 MG: 2 INJECTION INTRAMUSCULAR; INTRAVENOUS at 21:28

## 2019-05-30 RX ADMIN — LORAZEPAM 2 MG: 2 INJECTION INTRAMUSCULAR; INTRAVENOUS at 05:20

## 2019-05-30 RX ADMIN — FOLIC ACID 1 MG: 1 TABLET ORAL at 05:31

## 2019-05-30 RX ADMIN — OXYCODONE HYDROCHLORIDE AND ACETAMINOPHEN 500 MG: 500 TABLET ORAL at 05:31

## 2019-05-30 RX ADMIN — LORAZEPAM 2 MG: 0.5 TABLET ORAL at 13:55

## 2019-05-30 RX ADMIN — Medication 100 MG: at 05:31

## 2019-05-30 RX ADMIN — MULTIPLE VITAMINS W/ MINERALS TAB 1 TABLET: TAB at 05:31

## 2019-05-30 RX ADMIN — LORAZEPAM 1 MG: 1 TABLET ORAL at 17:24

## 2019-05-30 RX ADMIN — ENOXAPARIN SODIUM 40 MG: 100 INJECTION SUBCUTANEOUS at 05:32

## 2019-05-30 RX ADMIN — AMOXICILLIN AND CLAVULANATE POTASSIUM 1 TABLET: 875; 125 TABLET, FILM COATED ORAL at 17:24

## 2019-05-30 RX ADMIN — HALOPERIDOL LACTATE 1 MG: 5 INJECTION, SOLUTION INTRAMUSCULAR at 09:38

## 2019-05-30 RX ADMIN — SENNOSIDES,DOCUSATE SODIUM 2 TABLET: 8.6; 5 TABLET, FILM COATED ORAL at 05:31

## 2019-05-30 RX ADMIN — LORAZEPAM 2 MG: 2 INJECTION INTRAMUSCULAR; INTRAVENOUS at 03:38

## 2019-05-30 RX ADMIN — DOXYCYCLINE 100 MG: 100 TABLET, FILM COATED ORAL at 17:24

## 2019-05-30 RX ADMIN — LORAZEPAM 0.5 MG: 2 INJECTION INTRAMUSCULAR; INTRAVENOUS at 09:31

## 2019-05-30 RX ADMIN — DOXYCYCLINE 100 MG: 100 TABLET, FILM COATED ORAL at 05:31

## 2019-05-30 RX ADMIN — AMOXICILLIN AND CLAVULANATE POTASSIUM 1 TABLET: 875; 125 TABLET, FILM COATED ORAL at 05:31

## 2019-05-30 ASSESSMENT — LIFESTYLE VARIABLES
AUDITORY DISTURBANCES: NOT PRESENT
PAROXYSMAL SWEATS: BARELY PERCEPTIBLE SWEATING. PALMS MOIST
AGITATION: MODERATELY FIDGETY AND RESTLESS
AUDITORY DISTURBANCES: NOT PRESENT
AGITATION: *
NAUSEA AND VOMITING: NO NAUSEA AND NO VOMITING
AGITATION: *
TREMOR: *
NAUSEA AND VOMITING: NO NAUSEA AND NO VOMITING
NAUSEA AND VOMITING: NO NAUSEA AND NO VOMITING
VISUAL DISTURBANCES: NOT PRESENT
TREMOR: *
TOTAL SCORE: 11
AGITATION: *
AUDITORY DISTURBANCES: NOT PRESENT
AGITATION: *
HEADACHE, FULLNESS IN HEAD: NOT PRESENT
TREMOR: *
AGITATION: PACES BACK AND FORTH DURING MOST OF THE INTERVIEW OR CONSTANTLY THRASHES ABOUT.
VISUAL DISTURBANCES: NOT PRESENT
ANXIETY: *
AGITATION: *
ANXIETY: MODERATELY ANXIOUS OR GUARDED, SO ANXIETY IS INFERRED
ANXIETY: EQUIVALENT TO ACUTE PANIC STATES AS OCCUR IN SEVERE DELIRIUM OR ACUTE SCHIZOPHRENIC REACTIONS
PAROXYSMAL SWEATS: *
PAROXYSMAL SWEATS: NO SWEAT VISIBLE
TOTAL SCORE: 4
PAROXYSMAL SWEATS: BARELY PERCEPTIBLE SWEATING. PALMS MOIST
AGITATION: *
AGITATION: MODERATELY FIDGETY AND RESTLESS
ORIENTATION AND CLOUDING OF SENSORIUM: CANNOT DO SERIAL ADDITIONS OR IS UNCERTAIN ABOUT DATE
PAROXYSMAL SWEATS: BARELY PERCEPTIBLE SWEATING. PALMS MOIST
ANXIETY: MODERATELY ANXIOUS OR GUARDED, SO ANXIETY IS INFERRED
AUDITORY DISTURBANCES: NOT PRESENT
ORIENTATION AND CLOUDING OF SENSORIUM: CANNOT DO SERIAL ADDITIONS OR IS UNCERTAIN ABOUT DATE
VISUAL DISTURBANCES: NOT PRESENT
NAUSEA AND VOMITING: NO NAUSEA AND NO VOMITING
TREMOR: *
AUDITORY DISTURBANCES: SEVERE HALLUCINATIONS
NAUSEA AND VOMITING: NO NAUSEA AND NO VOMITING
AGITATION: MODERATELY FIDGETY AND RESTLESS
TOTAL SCORE: 27
ORIENTATION AND CLOUDING OF SENSORIUM: CANNOT DO SERIAL ADDITIONS OR IS UNCERTAIN ABOUT DATE
AUDITORY DISTURBANCES: NOT PRESENT
ANXIETY: *
AUDITORY DISTURBANCES: NOT PRESENT
PAROXYSMAL SWEATS: BARELY PERCEPTIBLE SWEATING. PALMS MOIST
TREMOR: *
HEADACHE, FULLNESS IN HEAD: NOT PRESENT
AUDITORY DISTURBANCES: MODERATE HARSHNESS OR ABILITY TO FRIGHTEN
TOTAL SCORE: 29
HEADACHE, FULLNESS IN HEAD: NOT PRESENT
NAUSEA AND VOMITING: NO NAUSEA AND NO VOMITING
VISUAL DISTURBANCES: NOT PRESENT
TOTAL SCORE: 8
TREMOR: *
TREMOR: NO TREMOR
TOTAL SCORE: SEVERE HALLUCINATIONS
NAUSEA AND VOMITING: NO NAUSEA AND NO VOMITING
PAROXYSMAL SWEATS: BARELY PERCEPTIBLE SWEATING. PALMS MOIST
VISUAL DISTURBANCES: NOT PRESENT
TOTAL SCORE: SEVERE HALLUCINATIONS
PAROXYSMAL SWEATS: BARELY PERCEPTIBLE SWEATING. PALMS MOIST
HEADACHE, FULLNESS IN HEAD: NOT PRESENT
TOTAL SCORE: 14
PAROXYSMAL SWEATS: NO SWEAT VISIBLE
VISUAL DISTURBANCES: NOT PRESENT
HEADACHE, FULLNESS IN HEAD: NOT PRESENT
TOTAL SCORE: 9
VISUAL DISTURBANCES: NOT PRESENT
PAROXYSMAL SWEATS: BARELY PERCEPTIBLE SWEATING. PALMS MOIST
HEADACHE, FULLNESS IN HEAD: VERY MILD
VISUAL DISTURBANCES: SEVERE HALLUCINATIONS
ORIENTATION AND CLOUDING OF SENSORIUM: ORIENTED AND CAN DO SERIAL ADDITIONS
NAUSEA AND VOMITING: NO NAUSEA AND NO VOMITING
ORIENTATION AND CLOUDING OF SENSORIUM: ORIENTED AND CAN DO SERIAL ADDITIONS
TREMOR: *
ORIENTATION AND CLOUDING OF SENSORIUM: CANNOT DO SERIAL ADDITIONS OR IS UNCERTAIN ABOUT DATE
HEADACHE, FULLNESS IN HEAD: NOT PRESENT
ANXIETY: *
TREMOR: *
ANXIETY: EQUIVALENT TO ACUTE PANIC STATES AS OCCUR IN SEVERE DELIRIUM OR ACUTE SCHIZOPHRENIC REACTIONS
HEADACHE, FULLNESS IN HEAD: NOT PRESENT
ANXIETY: *
HEADACHE, FULLNESS IN HEAD: NOT PRESENT
VISUAL DISTURBANCES: NOT PRESENT
NAUSEA AND VOMITING: NO NAUSEA AND NO VOMITING
AUDITORY DISTURBANCES: NOT PRESENT
TOTAL SCORE: 13
VISUAL DISTURBANCES: NOT PRESENT
ORIENTATION AND CLOUDING OF SENSORIUM: CANNOT DO SERIAL ADDITIONS OR IS UNCERTAIN ABOUT DATE
AUDITORY DISTURBANCES: NOT PRESENT
ANXIETY: MILDLY ANXIOUS
HEADACHE, FULLNESS IN HEAD: NOT PRESENT
TOTAL SCORE: 20
ORIENTATION AND CLOUDING OF SENSORIUM: CANNOT DO SERIAL ADDITIONS OR IS UNCERTAIN ABOUT DATE
NAUSEA AND VOMITING: NO NAUSEA AND NO VOMITING
ORIENTATION AND CLOUDING OF SENSORIUM: CANNOT DO SERIAL ADDITIONS OR IS UNCERTAIN ABOUT DATE
TOTAL SCORE: 15
TREMOR: *
ANXIETY: MILDLY ANXIOUS
ORIENTATION AND CLOUDING OF SENSORIUM: CANNOT DO SERIAL ADDITIONS OR IS UNCERTAIN ABOUT DATE

## 2019-05-30 NOTE — PROGRESS NOTES
Pt still non compliant even with soft wrists restraints in place. Pt screaming, hitting his head against upper bedside rails, kicking his legs, hitting his head against his knees and trying to get out of restraints. This RN and 4 RNs in room (Zachary Griffin, Pippa and Nanda). All RN were able to calm pt down, 2 mg ativan given, bed side rails were padded for pts safety. Will continue to monitor

## 2019-05-30 NOTE — PROGRESS NOTES
[facility] Medicine Daily Progress Note    Date of Service  5/30/2019    Chief Complaint  63 y.o. male admitted 5/26/2019 with left lower ext wound      Interval Problem Update  Patient is resting in bed, he is sleepy but able to arouse, his ciwa score is elevated and he is been requiring lorazepam and restrains, no able to give much information.   No fever.     Consultants/Specialty  none    Code Status  Full code    Disposition  Tbd.     Review of Systems  Review of Systems   Unable to perform ROS: Medical condition        Physical Exam  Temp:  [36.3 °C (97.4 °F)-36.7 °C (98.1 °F)] 36.3 °C (97.4 °F)  Pulse:  [71-86] 86  Resp:  [16-18] 18  BP: (121-155)/() 152/95  SpO2:  [93 %-96 %] 96 %    Physical Exam   Constitutional: He is oriented to person, place, and time. He appears well-nourished. No distress.   HENT:   Head: Normocephalic and atraumatic.   Mouth/Throat: No oropharyngeal exudate.   Eyes: Conjunctivae are normal. No scleral icterus.   Neck: Normal range of motion. Neck supple.   Cardiovascular: Normal rate and regular rhythm.  Exam reveals no gallop.    Pulmonary/Chest: Effort normal and breath sounds normal. No respiratory distress. He has no wheezes. He exhibits no tenderness.   Abdominal: Soft. Bowel sounds are normal. He exhibits no distension. There is no tenderness.   Musculoskeletal: Normal range of motion. He exhibits no edema.   Large wound on left lower ext medial aspect. Dressing in place.    Neurological: He is alert and oriented to person, place, and time. He exhibits normal muscle tone.   Skin: Skin is dry. No erythema.   Psychiatric: He has a normal mood and affect.   Nursing note and vitals reviewed.      Fluids    Intake/Output Summary (Last 24 hours) at 05/30/19 1634  Last data filed at 05/30/19 0430   Gross per 24 hour   Intake              250 ml   Output              825 ml   Net             -575 ml       Laboratory  Recent Labs      05/28/19   0257  05/29/19   0343   WBC  5.2  6.1    RBC  3.51*  3.45*   HEMOGLOBIN  9.0*  8.8*   HEMATOCRIT  29.5*  28.2*   MCV  84.0  81.7   MCH  25.6*  25.5*   MCHC  30.5*  31.2*   RDW  52.0*  49.3   PLATELETCT  437  493*   MPV  9.0  8.7*     Recent Labs      05/28/19   0257  05/29/19   0343   SODIUM  138  138   POTASSIUM  3.8  3.8   CHLORIDE  106  105   CO2  24  25   GLUCOSE  96  102*   BUN  11  9   CREATININE  0.89  0.77   CALCIUM  8.5  8.2*                   Imaging  DX-FOOT-COMPLETE 3+ LEFT   Final Result      1.  There is mild diffuse swelling of the left foot without soft tissue gas or evidence of osteomyelitis.      DX-TIBIA AND FIBULA LEFT   Final Result      1.  There are healed fractures of the distal right tibia and fibula with postoperative changes of the tibia.   2.  There is no evidence of osteomyelitis.      DX-FEMUR-2+ LEFT   Final Result      1.  No radiographic evidence of acute traumatic injury or osteomyelitis.           Assessment/Plan  * Cellulitis   Assessment & Plan    Patient reports that he was diagnosed with necrotizing fasciitis of the left lower leg in May 2018 and was treated at [facility].  Previous wound culture on 11/2018 showed MRSA with strep group A, blood cultures negative, wound culture on this admission negative so far.   Needs outpatient wound care, on po augmentin and doxy. Might need 7-10 days treatment.      Anemia   Assessment & Plan    Probably chronic, stable, no need for transfusion.      Alcohol use   Assessment & Plan    Continue monitoring for withdrawal.   Patient became agitated, ciwa score is elevated. He is on restrains at this time.      Tobacco abuse   Assessment & Plan    Needs to quit.           VTE prophylaxis: lovenox.

## 2019-05-30 NOTE — PROGRESS NOTES
[facility] Medicine Daily Progress Note    Date of Service  5/29/2019    Chief Complaint  63 y.o. male admitted 5/26/2019 with left lower ext wound      Interval Problem Update  Patient is resting in bed, denies any fever chills, he is tolerating diet, complaining of pain in his leg, will ask PT to see patient to evaluation for possible discharge needs, wound cultures on this admission negative so far, previous wound culture showed MRSA and strep, will continue monitoring, patient will need wound care as outpatient.  Patient denies any nausea vomiting the patient.    Consultants/Specialty  none    Code Status  Full code    Disposition  Tbd.     Review of Systems  Review of Systems   Constitutional: Negative for fever.   HENT: Negative for congestion.    Eyes: Negative for blurred vision.   Respiratory: Negative for cough and stridor.    Cardiovascular: Negative for chest pain.   Gastrointestinal: Negative for heartburn.   Genitourinary: Negative for dysuria.   Musculoskeletal: Negative for back pain and myalgias.   Skin: Negative for itching.   Neurological: Negative for dizziness and headaches.   Endo/Heme/Allergies: Does not bruise/bleed easily.   Psychiatric/Behavioral: Negative for depression.        Physical Exam  Temp:  [36.4 °C (97.6 °F)-37.2 °C (99 °F)] 36.7 °C (98 °F)  Pulse:  [71-94] 71  Resp:  [16-19] 16  BP: (129-149)/(79-95) 129/83  SpO2:  [91 %-95 %] 95 %    Physical Exam   Constitutional: He is oriented to person, place, and time. He appears well-nourished. No distress.   HENT:   Head: Normocephalic and atraumatic.   Mouth/Throat: No oropharyngeal exudate.   Eyes: Conjunctivae are normal. No scleral icterus.   Neck: Normal range of motion. Neck supple.   Cardiovascular: Normal rate and regular rhythm.  Exam reveals no gallop.    Pulmonary/Chest: Effort normal and breath sounds normal. No respiratory distress. He exhibits no tenderness.   Abdominal: Soft. Bowel sounds are normal. He exhibits no  distension. There is no tenderness.   Musculoskeletal: Normal range of motion. He exhibits no edema.   Large wound on left lower ext medial aspect.    Lymphadenopathy:     He has no cervical adenopathy.   Neurological: He is alert and oriented to person, place, and time. He exhibits normal muscle tone.   Skin: Skin is dry. No erythema.   Psychiatric: He has a normal mood and affect.   Nursing note and vitals reviewed.      Fluids    Intake/Output Summary (Last 24 hours) at 05/29/19 1827  Last data filed at 05/29/19 1400   Gross per 24 hour   Intake             1240 ml   Output             1122 ml   Net              118 ml       Laboratory  Recent Labs      05/26/19 2347 05/28/19 0257  05/29/19   0343   WBC  5.8  5.2  6.1   RBC  3.69*  3.51*  3.45*   HEMOGLOBIN  9.5*  9.0*  8.8*   HEMATOCRIT  30.9*  29.5*  28.2*   MCV  83.7  84.0  81.7   MCH  25.7*  25.6*  25.5*   MCHC  30.7*  30.5*  31.2*   RDW  51.0*  52.0*  49.3   PLATELETCT  455*  437  493*   MPV  8.7*  9.0  8.7*     Recent Labs      05/26/19 2347 05/28/19 0257  05/29/19   0343   SODIUM  139  138  138   POTASSIUM  4.3  3.8  3.8   CHLORIDE  105  106  105   CO2  25  24  25   GLUCOSE  100*  96  102*   BUN  14  11  9   CREATININE  0.77  0.89  0.77   CALCIUM  8.8  8.5  8.2*                   Imaging  DX-FOOT-COMPLETE 3+ LEFT   Final Result      1.  There is mild diffuse swelling of the left foot without soft tissue gas or evidence of osteomyelitis.      DX-TIBIA AND FIBULA LEFT   Final Result      1.  There are healed fractures of the distal right tibia and fibula with postoperative changes of the tibia.   2.  There is no evidence of osteomyelitis.      DX-FEMUR-2+ LEFT   Final Result      1.  No radiographic evidence of acute traumatic injury or osteomyelitis.           Assessment/Plan  * Cellulitis   Assessment & Plan    Patient reports that he was diagnosed with necrotizing fasciitis of the left lower leg in May 2018 and was treated at [facility].  Previous wound culture on 11/2018 showed MRSA with strep group A, blood cultures negative, wound culture on this admission negative so far will probably switch to oral antibiotics tomorrow if cultures continue to be negative.  Patient will need wound care as outpatient.     Alcohol use   Assessment & Plan    Continue monitoring for withdrawal.   No signs of withdrawal     Tobacco abuse   Assessment & Plan    Needs to quit.           VTE prophylaxis: lovenox.

## 2019-05-30 NOTE — THERAPY
PT evaluation order received. Talked with RN and will hold PT due to pt being sedated. Will attempt back as appropriate

## 2019-05-30 NOTE — PROGRESS NOTES
Pt combative, hitting himself on the head, pt is unsafe to himself and staff. Pt is shouting, swearing, non compliant in calling to get up OOB, bed alarm in place functioning correctly. CIWA assessment done, pt scores 16 on scale. 1.5mg of ativan given. Hospitalist Beatriz paged for restraints order. Order in, restraints placed, call light within reach, frequent monitoring

## 2019-05-30 NOTE — PROGRESS NOTES
Pt very agitated, shouting and swearing. Pt got up without calling bed alarm going off. This RN and charge RN in room, educated pt again about calling for assistance and reoriented to call light system. Pt given 1mg of ativan for agitation, will continue to monitor.

## 2019-05-31 LAB
BACTERIA WND AEROBE CULT: ABNORMAL
BACTERIA WND AEROBE CULT: ABNORMAL
GRAM STN SPEC: ABNORMAL
SIGNIFICANT IND 70042: ABNORMAL
SITE SITE: ABNORMAL
SOURCE SOURCE: ABNORMAL

## 2019-05-31 PROCEDURE — A9270 NON-COVERED ITEM OR SERVICE: HCPCS | Performed by: INTERNAL MEDICINE

## 2019-05-31 PROCEDURE — 770021 HCHG ROOM/CARE - ISO PRIVATE

## 2019-05-31 PROCEDURE — 700102 HCHG RX REV CODE 250 W/ 637 OVERRIDE(OP): Performed by: INTERNAL MEDICINE

## 2019-05-31 PROCEDURE — A9270 NON-COVERED ITEM OR SERVICE: HCPCS | Performed by: HOSPITALIST

## 2019-05-31 PROCEDURE — 97161 PT EVAL LOW COMPLEX 20 MIN: CPT

## 2019-05-31 PROCEDURE — 700111 HCHG RX REV CODE 636 W/ 250 OVERRIDE (IP): Performed by: INTERNAL MEDICINE

## 2019-05-31 PROCEDURE — 99232 SBSQ HOSP IP/OBS MODERATE 35: CPT | Performed by: HOSPITALIST

## 2019-05-31 PROCEDURE — 700102 HCHG RX REV CODE 250 W/ 637 OVERRIDE(OP): Performed by: HOSPITALIST

## 2019-05-31 RX ORDER — SULFAMETHOXAZOLE AND TRIMETHOPRIM 800; 160 MG/1; MG/1
1 TABLET ORAL EVERY 12 HOURS
Status: DISCONTINUED | OUTPATIENT
Start: 2019-05-31 | End: 2019-06-03 | Stop reason: HOSPADM

## 2019-05-31 RX ADMIN — SENNOSIDES,DOCUSATE SODIUM 2 TABLET: 8.6; 5 TABLET, FILM COATED ORAL at 06:06

## 2019-05-31 RX ADMIN — TRAMADOL HYDROCHLORIDE 50 MG: 50 TABLET, FILM COATED ORAL at 14:55

## 2019-05-31 RX ADMIN — OXYCODONE HYDROCHLORIDE AND ACETAMINOPHEN 500 MG: 500 TABLET ORAL at 06:06

## 2019-05-31 RX ADMIN — Medication 100 MG: at 06:06

## 2019-05-31 RX ADMIN — ENOXAPARIN SODIUM 40 MG: 100 INJECTION SUBCUTANEOUS at 06:06

## 2019-05-31 RX ADMIN — DOXYCYCLINE 100 MG: 100 TABLET, FILM COATED ORAL at 06:05

## 2019-05-31 RX ADMIN — AMOXICILLIN AND CLAVULANATE POTASSIUM 1 TABLET: 875; 125 TABLET, FILM COATED ORAL at 06:06

## 2019-05-31 RX ADMIN — NICOTINE 14 MG: 14 PATCH, EXTENDED RELEASE TRANSDERMAL at 06:05

## 2019-05-31 RX ADMIN — FOLIC ACID 1 MG: 1 TABLET ORAL at 06:06

## 2019-05-31 RX ADMIN — MULTIPLE VITAMINS W/ MINERALS TAB 1 TABLET: TAB at 06:06

## 2019-05-31 RX ADMIN — TRAMADOL HYDROCHLORIDE 50 MG: 50 TABLET, FILM COATED ORAL at 22:40

## 2019-05-31 RX ADMIN — SULFAMETHOXAZOLE AND TRIMETHOPRIM 1 TABLET: 800; 160 TABLET ORAL at 17:23

## 2019-05-31 RX ADMIN — LORAZEPAM 2 MG: 0.5 TABLET ORAL at 22:40

## 2019-05-31 RX ADMIN — SENNOSIDES,DOCUSATE SODIUM 2 TABLET: 8.6; 5 TABLET, FILM COATED ORAL at 17:22

## 2019-05-31 ASSESSMENT — LIFESTYLE VARIABLES
TREMOR: NO TREMOR
ANXIETY: MILDLY ANXIOUS
AGITATION: MODERATELY FIDGETY AND RESTLESS
HEADACHE, FULLNESS IN HEAD: NOT PRESENT
AUDITORY DISTURBANCES: NOT PRESENT
TREMOR: NO TREMOR
VISUAL DISTURBANCES: NOT PRESENT
AUDITORY DISTURBANCES: NOT PRESENT
TOTAL SCORE: 0
PAROXYSMAL SWEATS: NO SWEAT VISIBLE
TREMOR: NO TREMOR
ORIENTATION AND CLOUDING OF SENSORIUM: ORIENTED AND CAN DO SERIAL ADDITIONS
VISUAL DISTURBANCES: NOT PRESENT
HEADACHE, FULLNESS IN HEAD: NOT PRESENT
ANXIETY: NO ANXIETY (AT EASE)
ANXIETY: MILDLY ANXIOUS
AUDITORY DISTURBANCES: NOT PRESENT
VISUAL DISTURBANCES: NOT PRESENT
AGITATION: NORMAL ACTIVITY
AGITATION: *
NAUSEA AND VOMITING: NO NAUSEA AND NO VOMITING
TOTAL SCORE: 11
AGITATION: *
HEADACHE, FULLNESS IN HEAD: NOT PRESENT
TOTAL SCORE: 0
HEADACHE, FULLNESS IN HEAD: NOT PRESENT
AUDITORY DISTURBANCES: NOT PRESENT
TOTAL SCORE: 4
PAROXYSMAL SWEATS: NO SWEAT VISIBLE
TOTAL SCORE: 4
NAUSEA AND VOMITING: NO NAUSEA AND NO VOMITING
NAUSEA AND VOMITING: NO NAUSEA AND NO VOMITING
VISUAL DISTURBANCES: NOT PRESENT
VISUAL DISTURBANCES: NOT PRESENT
TREMOR: NO TREMOR
ORIENTATION AND CLOUDING OF SENSORIUM: ORIENTED AND CAN DO SERIAL ADDITIONS
ORIENTATION AND CLOUDING OF SENSORIUM: CANNOT DO SERIAL ADDITIONS OR IS UNCERTAIN ABOUT DATE
ANXIETY: NO ANXIETY (AT EASE)
HEADACHE, FULLNESS IN HEAD: NOT PRESENT
ANXIETY: MODERATELY ANXIOUS OR GUARDED, SO ANXIETY IS INFERRED
ORIENTATION AND CLOUDING OF SENSORIUM: ORIENTED AND CAN DO SERIAL ADDITIONS
TREMOR: *
AGITATION: NORMAL ACTIVITY
ORIENTATION AND CLOUDING OF SENSORIUM: CANNOT DO SERIAL ADDITIONS OR IS UNCERTAIN ABOUT DATE
PAROXYSMAL SWEATS: NO SWEAT VISIBLE
NAUSEA AND VOMITING: NO NAUSEA AND NO VOMITING
NAUSEA AND VOMITING: NO NAUSEA AND NO VOMITING
AUDITORY DISTURBANCES: NOT PRESENT

## 2019-05-31 ASSESSMENT — COGNITIVE AND FUNCTIONAL STATUS - GENERAL
SUGGESTED CMS G CODE MODIFIER MOBILITY: CH
MOBILITY SCORE: 24

## 2019-05-31 ASSESSMENT — ENCOUNTER SYMPTOMS
HEADACHES: 0
FEVER: 0
DIZZINESS: 0
DEPRESSION: 0
MYALGIAS: 0
HEMOPTYSIS: 0
HEARTBURN: 0
BLURRED VISION: 0
COUGH: 0
PALPITATIONS: 0

## 2019-05-31 ASSESSMENT — GAIT ASSESSMENTS
DISTANCE (FEET): 500
GAIT LEVEL OF ASSIST: SUPERVISED

## 2019-05-31 NOTE — PROGRESS NOTES
Hospitalist notified at approximately 2130; Spoke to  regarding patient's Softwrist restraints and Lap belt extension. Received order of non-violent restraints renewal. Patient has been trying to forcefully removed restraints to both wrist causing wrist to turn red, skin blanching and continuously assessed Q2H. Patient re-oriented and redirected with + effect. Bilateral radial pulses also intact (2+), no circulatory compromise noted. Bilateral elbows pink and blanching, mepilex applied. Encourage fluid intake and urinal use.

## 2019-05-31 NOTE — CARE PLAN
Problem: Safety  Goal: Will remain free from falls    Intervention: Assess risk factors for falls  Patient on moderate risk for fall; Bed alarm utilized; Patient encourage to use call button when in need of help       Problem: Venous Thromboembolism (VTW)/Deep Vein Thrombosis (DVT) Prevention:  Goal: Patient will participate in Venous Thrombosis (VTE)/Deep Vein Thrombosis (DVT)Prevention Measures    Intervention: Assess and monitor for anticoagulation complications  Continues on anticoagulant therapy; No visible s/s of active bleeding noted.

## 2019-05-31 NOTE — THERAPY
"Physical Therapy Evaluation completed.   Bed Mobility:  Supine to Sit: Supervised  Transfers: Sit to Stand: Supervised  Gait: Level Of Assist: Supervised with No Equipment Needed       Plan of Care: Patient with no further skilled PT needs in the acute care setting at this time  Discharge Recommendations: Equipment: No Equipment Needed. Post-acute therapy-see assessment below.  See \"Rehab Therapy-Acute\" Patient Summary Report for complete documentation.     Patient is a 64 y/o male that presents to acute care with a L cellulitis. PMH of alcohol abuse and experienced withdrawal, now cleared by nurse.Patient demonstrated bed mobility, transfers, & gait at supervision level. Pt demonstrated good balance as he was able to dance in the leiva during ambulation. He was easily distracted, but redirected easily. Dysarthia noted with speech which made it difficult to understand the pt. Based on current level of function acute PT is not needed. Recommend to continue to ambulate with nursing staff to maintain current level of function. DC barriers appear more social work based at this time as if patient continues to drink and have an unhygenic living environment likely continue to have infection and falling issues. Will not be formally followed by acute PT, but will be available to consult on DC needs.   "

## 2019-05-31 NOTE — PROGRESS NOTES
[facility] Medicine Daily Progress Note    Date of Service  5/31/2019    Chief Complaint  63 y.o. male admitted 5/26/2019 with left lower ext wound      Interval Problem Update  Patient is feeling better he is more cooperative, no fever or chills, not in distress, wound cx positive for MRSA started on bactrim, denies any N/V/D/C.    Consultants/Specialty  none    Code Status  Full code    Disposition  Tbd.     Review of Systems  Review of Systems   Constitutional: Negative for fever.   HENT: Negative for congestion.    Eyes: Negative for blurred vision.   Respiratory: Negative for cough and hemoptysis.    Cardiovascular: Negative for chest pain and palpitations.   Gastrointestinal: Negative for heartburn.   Genitourinary: Negative for dysuria.   Musculoskeletal: Negative for myalgias.   Neurological: Negative for dizziness and headaches.   Psychiatric/Behavioral: Negative for depression.        Physical Exam  Temp:  [36.5 °C (97.7 °F)-36.6 °C (97.9 °F)] 36.5 °C (97.7 °F)  Pulse:  [79-93] 93  Resp:  [16] 16  BP: (138-151)/(81-84) 138/84  SpO2:  [94 %] 94 %    Physical Exam   Constitutional: He is oriented to person, place, and time. He appears well-nourished. No distress.   HENT:   Head: Normocephalic and atraumatic.   Eyes: Conjunctivae are normal. No scleral icterus.   Neck: Normal range of motion. Neck supple.   Cardiovascular: Normal rate and regular rhythm.  Exam reveals no gallop.    Pulmonary/Chest: Effort normal and breath sounds normal. No respiratory distress. He has no wheezes.   Abdominal: Soft. Bowel sounds are normal. He exhibits no distension. There is no tenderness.   Musculoskeletal: Normal range of motion. He exhibits no edema.   Large wound on left lower ext medial aspect. Dressing in place.    Neurological: He is alert and oriented to person, place, and time. He exhibits normal muscle tone.   More alert and oriented today follows commands he is off retrains.    Skin: Skin is dry. No erythema.    Psychiatric: He has a normal mood and affect.   Nursing note and vitals reviewed.      Fluids    Intake/Output Summary (Last 24 hours) at 05/31/19 1635  Last data filed at 05/31/19 0630   Gross per 24 hour   Intake              240 ml   Output              750 ml   Net             -510 ml       Laboratory  Recent Labs      05/29/19   0343   WBC  6.1   RBC  3.45*   HEMOGLOBIN  8.8*   HEMATOCRIT  28.2*   MCV  81.7   MCH  25.5*   MCHC  31.2*   RDW  49.3   PLATELETCT  493*   MPV  8.7*     Recent Labs      05/29/19   0343   SODIUM  138   POTASSIUM  3.8   CHLORIDE  105   CO2  25   GLUCOSE  102*   BUN  9   CREATININE  0.77   CALCIUM  8.2*                   Imaging  DX-FOOT-COMPLETE 3+ LEFT   Final Result      1.  There is mild diffuse swelling of the left foot without soft tissue gas or evidence of osteomyelitis.      DX-TIBIA AND FIBULA LEFT   Final Result      1.  There are healed fractures of the distal right tibia and fibula with postoperative changes of the tibia.   2.  There is no evidence of osteomyelitis.      DX-FEMUR-2+ LEFT   Final Result      1.  No radiographic evidence of acute traumatic injury or osteomyelitis.           Assessment/Plan  * Cellulitis   Assessment & Plan    Patient reports that he was diagnosed with necrotizing fasciitis of the left lower leg in May 2018 and was treated at [facility].  Previous wound culture on 11/2018 showed MRSA with strep group A, blood cultures negative, wound culture on this admission + for MRSA, changed atb to bactrim.     Needs outpatient wound care.        Anemia   Assessment & Plan    Probably chronic, stable, no need for transfusion.      Alcohol use   Assessment & Plan    Continue monitoring for withdrawal.   Doing much better, CIWA score is low, he is off retrains.      Tobacco abuse   Assessment & Plan    Needs to quit.           VTE prophylaxis: lovenox.

## 2019-05-31 NOTE — PROGRESS NOTES
Patient calm, cooperative, and pleasant with care this am. Using call light appropriately. No outbursts or attempts at getting out of bed. Patient understands reason for hospital stay and states if unrestrained he will continue to call for help when needed. Restraints discontinued after discussion with MD. Bed alarm on. Will continue to monitor patient for behavior changes.

## 2019-05-31 NOTE — DISCHARGE PLANNING
Care Transition Team Assessment    Information Source  Orientation : Disoriented to Time, Disoriented to Place  Who is responsible for making decisions for patient? : Patient    Readmission Evaluation  Is this a readmission?: No    Elopement Risk  Legal Hold: No  Ambulatory or Self Mobile in Wheelchair: Yes  Disoriented: Time-At Risk for Elopement, Place-At Risk for Elopement  Elopement Risk: At Risk for Elopement  Wanderguard On: Unavailable  Personal Belongings: Hospital Clothing Only    Interdisciplinary Discharge Planning  Patient or legal guardian wants to designate a caregiver (see row info): No    Discharge Preparedness  What is your plan after discharge?: Uncertain - pending medical team collaboration  What are your discharge supports?: Other (comment) (none)  Prior Functional Level: Ambulatory    Functional Assesment  Prior Functional Level: Ambulatory    Finances  Prescription Coverage: Yes                   Domestic Abuse  Have you ever been the victim of abuse or violence?: No    Psychological Assessment  History of Substance Abuse: Alcohol         Anticipated Discharge Information  Anticipated discharge disposition: Shelter

## 2019-06-01 LAB
BACTERIA BLD CULT: NORMAL
BACTERIA BLD CULT: NORMAL
HCT VFR BLD AUTO: 33.3 % (ref 42–52)
HGB BLD-MCNC: 10.2 G/DL (ref 14–18)
MAGNESIUM SERPL-MCNC: 1.7 MG/DL (ref 1.5–2.5)
PHOSPHATE SERPL-MCNC: 4 MG/DL (ref 2.5–4.5)
SIGNIFICANT IND 70042: NORMAL
SIGNIFICANT IND 70042: NORMAL
SITE SITE: NORMAL
SITE SITE: NORMAL
SOURCE SOURCE: NORMAL
SOURCE SOURCE: NORMAL

## 2019-06-01 PROCEDURE — 99232 SBSQ HOSP IP/OBS MODERATE 35: CPT | Performed by: HOSPITALIST

## 2019-06-01 PROCEDURE — 700102 HCHG RX REV CODE 250 W/ 637 OVERRIDE(OP): Performed by: HOSPITALIST

## 2019-06-01 PROCEDURE — 83735 ASSAY OF MAGNESIUM: CPT

## 2019-06-01 PROCEDURE — 84100 ASSAY OF PHOSPHORUS: CPT

## 2019-06-01 PROCEDURE — A9270 NON-COVERED ITEM OR SERVICE: HCPCS | Performed by: HOSPITALIST

## 2019-06-01 PROCEDURE — 85014 HEMATOCRIT: CPT

## 2019-06-01 PROCEDURE — 85018 HEMOGLOBIN: CPT

## 2019-06-01 PROCEDURE — 700111 HCHG RX REV CODE 636 W/ 250 OVERRIDE (IP): Performed by: INTERNAL MEDICINE

## 2019-06-01 PROCEDURE — 770021 HCHG ROOM/CARE - ISO PRIVATE

## 2019-06-01 PROCEDURE — 36415 COLL VENOUS BLD VENIPUNCTURE: CPT

## 2019-06-01 PROCEDURE — 700102 HCHG RX REV CODE 250 W/ 637 OVERRIDE(OP): Performed by: INTERNAL MEDICINE

## 2019-06-01 PROCEDURE — A9270 NON-COVERED ITEM OR SERVICE: HCPCS | Performed by: INTERNAL MEDICINE

## 2019-06-01 RX ADMIN — TRAMADOL HYDROCHLORIDE 50 MG: 50 TABLET, FILM COATED ORAL at 20:26

## 2019-06-01 RX ADMIN — ENOXAPARIN SODIUM 40 MG: 100 INJECTION SUBCUTANEOUS at 06:12

## 2019-06-01 RX ADMIN — SULFAMETHOXAZOLE AND TRIMETHOPRIM 1 TABLET: 800; 160 TABLET ORAL at 06:12

## 2019-06-01 RX ADMIN — MAGNESIUM OXIDE TAB 400 MG (241.3 MG ELEMENTAL MG) 400 MG: 400 (241.3 MG) TAB at 09:19

## 2019-06-01 RX ADMIN — MULTIPLE VITAMINS W/ MINERALS TAB 1 TABLET: TAB at 06:12

## 2019-06-01 RX ADMIN — SULFAMETHOXAZOLE AND TRIMETHOPRIM 1 TABLET: 800; 160 TABLET ORAL at 17:25

## 2019-06-01 RX ADMIN — OXYCODONE HYDROCHLORIDE AND ACETAMINOPHEN 500 MG: 500 TABLET ORAL at 06:12

## 2019-06-01 RX ADMIN — TRAMADOL HYDROCHLORIDE 50 MG: 50 TABLET, FILM COATED ORAL at 14:13

## 2019-06-01 ASSESSMENT — LIFESTYLE VARIABLES
ORIENTATION AND CLOUDING OF SENSORIUM: ORIENTED AND CAN DO SERIAL ADDITIONS
PAROXYSMAL SWEATS: NO SWEAT VISIBLE
AGITATION: NORMAL ACTIVITY
HEADACHE, FULLNESS IN HEAD: NOT PRESENT
TOTAL SCORE: 0
NAUSEA AND VOMITING: NO NAUSEA AND NO VOMITING
PAROXYSMAL SWEATS: NO SWEAT VISIBLE
HEADACHE, FULLNESS IN HEAD: NOT PRESENT
TREMOR: NO TREMOR
ORIENTATION AND CLOUDING OF SENSORIUM: ORIENTED AND CAN DO SERIAL ADDITIONS
VISUAL DISTURBANCES: NOT PRESENT
NAUSEA AND VOMITING: NO NAUSEA AND NO VOMITING
AUDITORY DISTURBANCES: NOT PRESENT
TOTAL SCORE: 0
PAROXYSMAL SWEATS: NO SWEAT VISIBLE
NAUSEA AND VOMITING: NO NAUSEA AND NO VOMITING
ANXIETY: NO ANXIETY (AT EASE)
NAUSEA AND VOMITING: NO NAUSEA AND NO VOMITING
ORIENTATION AND CLOUDING OF SENSORIUM: ORIENTED AND CAN DO SERIAL ADDITIONS
NAUSEA AND VOMITING: NO NAUSEA AND NO VOMITING
AUDITORY DISTURBANCES: NOT PRESENT
PAROXYSMAL SWEATS: NO SWEAT VISIBLE
ANXIETY: NO ANXIETY (AT EASE)
AGITATION: NORMAL ACTIVITY
VISUAL DISTURBANCES: NOT PRESENT
TREMOR: NO TREMOR
TOTAL SCORE: 0
HEADACHE, FULLNESS IN HEAD: NOT PRESENT
ANXIETY: NO ANXIETY (AT EASE)
TREMOR: NO TREMOR
AGITATION: NORMAL ACTIVITY
ANXIETY: NO ANXIETY (AT EASE)
ORIENTATION AND CLOUDING OF SENSORIUM: ORIENTED AND CAN DO SERIAL ADDITIONS
HEADACHE, FULLNESS IN HEAD: NOT PRESENT
AUDITORY DISTURBANCES: NOT PRESENT
AUDITORY DISTURBANCES: NOT PRESENT
TREMOR: NO TREMOR
VISUAL DISTURBANCES: NOT PRESENT
TOTAL SCORE: 0
HEADACHE, FULLNESS IN HEAD: NOT PRESENT
AGITATION: NORMAL ACTIVITY
VISUAL DISTURBANCES: NOT PRESENT
ORIENTATION AND CLOUDING OF SENSORIUM: ORIENTED AND CAN DO SERIAL ADDITIONS
TOTAL SCORE: 0
ORIENTATION AND CLOUDING OF SENSORIUM: ORIENTED AND CAN DO SERIAL ADDITIONS
VISUAL DISTURBANCES: NOT PRESENT
AGITATION: NORMAL ACTIVITY
TREMOR: NO TREMOR
PAROXYSMAL SWEATS: NO SWEAT VISIBLE
NAUSEA AND VOMITING: NO NAUSEA AND NO VOMITING
AUDITORY DISTURBANCES: NOT PRESENT
PAROXYSMAL SWEATS: NO SWEAT VISIBLE
HEADACHE, FULLNESS IN HEAD: NOT PRESENT
TREMOR: NO TREMOR
AGITATION: NORMAL ACTIVITY
VISUAL DISTURBANCES: NOT PRESENT
AUDITORY DISTURBANCES: NOT PRESENT
ANXIETY: NO ANXIETY (AT EASE)
ANXIETY: NO ANXIETY (AT EASE)
TOTAL SCORE: 0

## 2019-06-01 ASSESSMENT — ENCOUNTER SYMPTOMS
HEMOPTYSIS: 0
HEARTBURN: 0
DEPRESSION: 0
CHILLS: 0
DIZZINESS: 0
MYALGIAS: 0
HEADACHES: 0
BLURRED VISION: 0
COUGH: 0

## 2019-06-01 NOTE — PROGRESS NOTES
[facility] Medicine Daily Progress Note    Date of Service  6/1/2019    Chief Complaint  63 y.o. male admitted 5/26/2019 with left lower ext wound      Interval Problem Update  Patient is resting in bed, no new complains, not in distress no fever or chills.   Pending wound care as outpatient.      Consultants/Specialty  none    Code Status  Full code    Disposition  Tbd.     Review of Systems  Review of Systems   Constitutional: Negative for chills.   HENT: Negative for congestion.    Eyes: Negative for blurred vision.   Respiratory: Negative for cough and hemoptysis.    Cardiovascular: Negative for chest pain.   Gastrointestinal: Negative for heartburn.   Musculoskeletal: Negative for myalgias.   Neurological: Negative for dizziness and headaches.   Psychiatric/Behavioral: Negative for depression.        Physical Exam  Temp:  [37.1 °C (98.7 °F)-37.3 °C (99.2 °F)] 37.3 °C (99.2 °F)  Pulse:  [89-93] 93  Resp:  [16-17] 16  BP: (116-117)/(72-76) 116/76  SpO2:  [95 %-96 %] 95 %    Physical Exam   Constitutional: He is oriented to person, place, and time. He appears well-nourished. No distress.   HENT:   Head: Normocephalic and atraumatic.   Eyes: Conjunctivae are normal. No scleral icterus.   Neck: Normal range of motion. Neck supple.   Cardiovascular: Normal rate and regular rhythm.  Exam reveals no gallop.    Pulmonary/Chest: Effort normal and breath sounds normal. No respiratory distress. He has no rales.   Abdominal: Soft. Bowel sounds are normal. He exhibits no distension. There is no tenderness. There is no rebound.   Musculoskeletal: Normal range of motion. He exhibits no edema.   Large wound on left lower ext medial aspect. Dressing in place.    Neurological: He is alert and oriented to person, place, and time. He exhibits normal muscle tone.   More alert and oriented today follows commands he is off retrains.    Skin: Skin is dry. No erythema.   Psychiatric: He has a normal mood and affect.   Nursing note and  vitals reviewed.      Fluids  No intake or output data in the 24 hours ending 06/01/19 1513    Laboratory  Recent Labs      06/01/19   0335   HEMOGLOBIN  10.2*   HEMATOCRIT  33.3*                       Imaging  DX-FOOT-COMPLETE 3+ LEFT   Final Result      1.  There is mild diffuse swelling of the left foot without soft tissue gas or evidence of osteomyelitis.      DX-TIBIA AND FIBULA LEFT   Final Result      1.  There are healed fractures of the distal right tibia and fibula with postoperative changes of the tibia.   2.  There is no evidence of osteomyelitis.      DX-FEMUR-2+ LEFT   Final Result      1.  No radiographic evidence of acute traumatic injury or osteomyelitis.           Assessment/Plan  * Cellulitis   Assessment & Plan    Patient reports that he was diagnosed with necrotizing fasciitis of the left lower leg in May 2018 and was treated at [facility].  Previous wound culture on 11/2018 showed MRSA with strep group A, blood cultures negative, wound culture on this admission + for MRSA, changed atb to bactrim on 5/31     Needs outpatient wound care.        Anemia   Assessment & Plan    Probably chronic, stable, no need for transfusion.      Alcohol use   Assessment & Plan    Continue monitoring for withdrawal.   Doing much better, CIWA score is low, he is off retrains.   Doing much better.     Tobacco abuse   Assessment & Plan    Needs to quit.           VTE prophylaxis: lovenox.

## 2019-06-02 PROCEDURE — 770021 HCHG ROOM/CARE - ISO PRIVATE

## 2019-06-02 PROCEDURE — A9270 NON-COVERED ITEM OR SERVICE: HCPCS | Performed by: INTERNAL MEDICINE

## 2019-06-02 PROCEDURE — 700102 HCHG RX REV CODE 250 W/ 637 OVERRIDE(OP): Performed by: HOSPITALIST

## 2019-06-02 PROCEDURE — 99232 SBSQ HOSP IP/OBS MODERATE 35: CPT | Performed by: HOSPITALIST

## 2019-06-02 PROCEDURE — A9270 NON-COVERED ITEM OR SERVICE: HCPCS | Performed by: HOSPITALIST

## 2019-06-02 PROCEDURE — 700102 HCHG RX REV CODE 250 W/ 637 OVERRIDE(OP): Performed by: INTERNAL MEDICINE

## 2019-06-02 RX ADMIN — SULFAMETHOXAZOLE AND TRIMETHOPRIM 1 TABLET: 800; 160 TABLET ORAL at 05:43

## 2019-06-02 RX ADMIN — TRAMADOL HYDROCHLORIDE 50 MG: 50 TABLET, FILM COATED ORAL at 19:30

## 2019-06-02 RX ADMIN — MULTIPLE VITAMINS W/ MINERALS TAB 1 TABLET: TAB at 05:43

## 2019-06-02 RX ADMIN — OXYCODONE HYDROCHLORIDE AND ACETAMINOPHEN 500 MG: 500 TABLET ORAL at 05:43

## 2019-06-02 RX ADMIN — SULFAMETHOXAZOLE AND TRIMETHOPRIM 1 TABLET: 800; 160 TABLET ORAL at 17:36

## 2019-06-02 RX ADMIN — TRAMADOL HYDROCHLORIDE 50 MG: 50 TABLET, FILM COATED ORAL at 05:43

## 2019-06-02 ASSESSMENT — LIFESTYLE VARIABLES
ORIENTATION AND CLOUDING OF SENSORIUM: ORIENTED AND CAN DO SERIAL ADDITIONS
TOTAL SCORE: 0
PAROXYSMAL SWEATS: NO SWEAT VISIBLE
AGITATION: NORMAL ACTIVITY
TOTAL SCORE: 0
TREMOR: NO TREMOR
TOTAL SCORE: 0
NAUSEA AND VOMITING: NO NAUSEA AND NO VOMITING
HEADACHE, FULLNESS IN HEAD: NOT PRESENT
TOTAL SCORE: 0
PAROXYSMAL SWEATS: NO SWEAT VISIBLE
TREMOR: NO TREMOR
ANXIETY: NO ANXIETY (AT EASE)
TREMOR: NO TREMOR
HEADACHE, FULLNESS IN HEAD: NOT PRESENT
VISUAL DISTURBANCES: NOT PRESENT
NAUSEA AND VOMITING: NO NAUSEA AND NO VOMITING
TREMOR: NO TREMOR
ORIENTATION AND CLOUDING OF SENSORIUM: ORIENTED AND CAN DO SERIAL ADDITIONS
HEADACHE, FULLNESS IN HEAD: NOT PRESENT
ANXIETY: NO ANXIETY (AT EASE)
ORIENTATION AND CLOUDING OF SENSORIUM: ORIENTED AND CAN DO SERIAL ADDITIONS
TOTAL SCORE: 0
ORIENTATION AND CLOUDING OF SENSORIUM: ORIENTED AND CAN DO SERIAL ADDITIONS
VISUAL DISTURBANCES: NOT PRESENT
NAUSEA AND VOMITING: NO NAUSEA AND NO VOMITING
AGITATION: NORMAL ACTIVITY
ORIENTATION AND CLOUDING OF SENSORIUM: ORIENTED AND CAN DO SERIAL ADDITIONS
VISUAL DISTURBANCES: NOT PRESENT
HEADACHE, FULLNESS IN HEAD: NOT PRESENT
VISUAL DISTURBANCES: NOT PRESENT
NAUSEA AND VOMITING: NO NAUSEA AND NO VOMITING
AUDITORY DISTURBANCES: NOT PRESENT
AUDITORY DISTURBANCES: NOT PRESENT
NAUSEA AND VOMITING: NO NAUSEA AND NO VOMITING
AGITATION: NORMAL ACTIVITY
ANXIETY: NO ANXIETY (AT EASE)
HEADACHE, FULLNESS IN HEAD: NOT PRESENT
ANXIETY: NO ANXIETY (AT EASE)
PAROXYSMAL SWEATS: NO SWEAT VISIBLE
PAROXYSMAL SWEATS: NO SWEAT VISIBLE
ANXIETY: NO ANXIETY (AT EASE)
AGITATION: NORMAL ACTIVITY
AUDITORY DISTURBANCES: NOT PRESENT
VISUAL DISTURBANCES: NOT PRESENT
AGITATION: NORMAL ACTIVITY
TREMOR: NO TREMOR
AUDITORY DISTURBANCES: NOT PRESENT
PAROXYSMAL SWEATS: NO SWEAT VISIBLE
AUDITORY DISTURBANCES: NOT PRESENT

## 2019-06-02 ASSESSMENT — ENCOUNTER SYMPTOMS
BLURRED VISION: 0
DIZZINESS: 0
MYALGIAS: 0
DEPRESSION: 0
FEVER: 0
HEARTBURN: 0
COUGH: 0

## 2019-06-02 NOTE — CARE PLAN
Problem: Safety  Goal: Will remain free from falls  Outcome: PROGRESSING AS EXPECTED  Pt less impulsive than previous shifts. Hourly rounding in place, call light within reach.    Problem: Infection  Goal: Will remain free from infection  Outcome: PROGRESSING AS EXPECTED  Contact precautions maintained.

## 2019-06-02 NOTE — CARE PLAN
Problem: Safety  Goal: Will remain free from falls  Outcome: PROGRESSING AS EXPECTED    Intervention: Implement fall precautions  Bed in low position, wheels locked, call light within reach, hourly rounding in place.      Problem: Venous Thromboembolism (VTW)/Deep Vein Thrombosis (DVT) Prevention:  Goal: Patient will participate in Venous Thrombosis (VTE)/Deep Vein Thrombosis (DVT)Prevention Measures    Intervention: Ensure patient wears graduated elastic stockings (EDA hose) and/or SCDs, if ordered, when in bed or chair (Remove at least once per shift for skin check)  Patient refusing SCDs.  Intervention: Encourage ambulation/mobilization at level directed by Physical Therapy in collaboration with Interdisciplinary Team  Patient ambulating with standby assist and no DME.

## 2019-06-02 NOTE — PROGRESS NOTES
[facility] Medicine Daily Progress Note    Date of Service  6/2/2019    Chief Complaint  63 y.o. male admitted 5/26/2019 with left lower ext wound      Interval Problem Update  Doing much better, tolerating diet, leg pain is improved, no fever or chills.     Consultants/Specialty  none    Code Status  Full code    Disposition  Needs wound care as outpatient.     Review of Systems  Review of Systems   Constitutional: Negative for fever.   HENT: Negative for congestion.    Eyes: Negative for blurred vision.   Respiratory: Negative for cough.    Cardiovascular: Negative for chest pain.   Gastrointestinal: Negative for heartburn.   Musculoskeletal: Negative for myalgias.   Neurological: Negative for dizziness.   Psychiatric/Behavioral: Negative for depression.        Physical Exam  Temp:  [36.7 °C (98.1 °F)-37.6 °C (99.6 °F)] 36.7 °C (98.1 °F)  Pulse:  [77-84] 79  Resp:  [16-18] 16  BP: (107-115)/(63-74) 115/74  SpO2:  [94 %-97 %] 97 %    Physical Exam   Constitutional: He is oriented to person, place, and time. He appears well-nourished. No distress.   HENT:   Head: Normocephalic and atraumatic.   Eyes: Conjunctivae are normal. No scleral icterus.   Neck: Normal range of motion. Neck supple.   Cardiovascular: Normal rate and regular rhythm.  Exam reveals no gallop.    Pulmonary/Chest: Effort normal and breath sounds normal. No respiratory distress. He has no wheezes.   Abdominal: Soft. Bowel sounds are normal. He exhibits no distension. There is no tenderness.   Musculoskeletal: Normal range of motion. He exhibits no edema.   Large wound on left lower ext medial aspect. Dressing in place. No discharge.    Lymphadenopathy:     He has no cervical adenopathy.   Neurological: He is alert and oriented to person, place, and time. He exhibits normal muscle tone.   More alert and oriented today follows commands he is off retrains.    Skin: Skin is dry. No erythema.   Psychiatric: He has a normal mood and affect.   Nursing note  and vitals reviewed.      Fluids  No intake or output data in the 24 hours ending 06/02/19 1540    Laboratory  Recent Labs      06/01/19   0335   HEMOGLOBIN  10.2*   HEMATOCRIT  33.3*                       Imaging  DX-FOOT-COMPLETE 3+ LEFT   Final Result      1.  There is mild diffuse swelling of the left foot without soft tissue gas or evidence of osteomyelitis.      DX-TIBIA AND FIBULA LEFT   Final Result      1.  There are healed fractures of the distal right tibia and fibula with postoperative changes of the tibia.   2.  There is no evidence of osteomyelitis.      DX-FEMUR-2+ LEFT   Final Result      1.  No radiographic evidence of acute traumatic injury or osteomyelitis.           Assessment/Plan  * Cellulitis   Assessment & Plan    Patient reports that he was diagnosed with necrotizing fasciitis of the left lower leg in May 2018 and was treated at [facility].  Previous wound culture on 11/2018 showed MRSA with strep group A, blood cultures negative, wound culture on this admission + for MRSA, changed atb to bactrim on 5/31     Needs outpatient wound care.        Anemia   Assessment & Plan    Probably chronic, stable, no need for transfusion.      Alcohol use   Assessment & Plan    Continue monitoring for withdrawal.   Doing much better, CIWA score is low, he is off retrains.   Withdrawal resolving.      Tobacco abuse   Assessment & Plan    Needs to quit.           VTE prophylaxis: lovenox.

## 2019-06-03 VITALS
HEART RATE: 75 BPM | TEMPERATURE: 97.8 F | DIASTOLIC BLOOD PRESSURE: 74 MMHG | RESPIRATION RATE: 18 BRPM | WEIGHT: 145.28 LBS | SYSTOLIC BLOOD PRESSURE: 120 MMHG | BODY MASS INDEX: 20.34 KG/M2 | OXYGEN SATURATION: 93 % | HEIGHT: 71 IN

## 2019-06-03 PROCEDURE — A9270 NON-COVERED ITEM OR SERVICE: HCPCS | Performed by: INTERNAL MEDICINE

## 2019-06-03 PROCEDURE — 700102 HCHG RX REV CODE 250 W/ 637 OVERRIDE(OP): Performed by: INTERNAL MEDICINE

## 2019-06-03 PROCEDURE — 700102 HCHG RX REV CODE 250 W/ 637 OVERRIDE(OP): Performed by: HOSPITALIST

## 2019-06-03 PROCEDURE — 99239 HOSP IP/OBS DSCHRG MGMT >30: CPT | Performed by: HOSPITALIST

## 2019-06-03 PROCEDURE — A9270 NON-COVERED ITEM OR SERVICE: HCPCS | Performed by: HOSPITALIST

## 2019-06-03 RX ORDER — SULFAMETHOXAZOLE AND TRIMETHOPRIM 800; 160 MG/1; MG/1
1 TABLET ORAL EVERY 12 HOURS
Qty: 14 TAB | Refills: 0 | Status: SHIPPED | OUTPATIENT
Start: 2019-06-03 | End: 2019-06-07

## 2019-06-03 RX ORDER — ACETAMINOPHEN 325 MG/1
650 TABLET ORAL EVERY 6 HOURS PRN
Qty: 30 TAB | Refills: 0 | COMMUNITY
Start: 2019-06-03 | End: 2020-01-31

## 2019-06-03 RX ORDER — ASCORBIC ACID 500 MG
500 TABLET ORAL DAILY
Qty: 30 TAB | Refills: 0 | Status: SHIPPED
Start: 2019-06-04 | End: 2020-01-31

## 2019-06-03 RX ORDER — M-VIT,TX,IRON,MINS/CALC/FOLIC 27MG-0.4MG
1 TABLET ORAL DAILY
Qty: 30 TAB | Refills: 0 | Status: SHIPPED
Start: 2019-06-04 | End: 2020-01-31

## 2019-06-03 RX ADMIN — OXYCODONE HYDROCHLORIDE AND ACETAMINOPHEN 500 MG: 500 TABLET ORAL at 04:24

## 2019-06-03 RX ADMIN — MULTIPLE VITAMINS W/ MINERALS TAB 1 TABLET: TAB at 04:24

## 2019-06-03 RX ADMIN — TRAMADOL HYDROCHLORIDE 50 MG: 50 TABLET, FILM COATED ORAL at 04:24

## 2019-06-03 RX ADMIN — TRAMADOL HYDROCHLORIDE 50 MG: 50 TABLET, FILM COATED ORAL at 12:26

## 2019-06-03 RX ADMIN — SULFAMETHOXAZOLE AND TRIMETHOPRIM 1 TABLET: 800; 160 TABLET ORAL at 04:24

## 2019-06-03 NOTE — PROGRESS NOTES
Patient given discharge instructions, wound care appointment information, and bus pass. Patient walked out with escort to bus stop.

## 2019-06-03 NOTE — CARE PLAN
Problem: Safety  Goal: Will remain free from falls  Outcome: PROGRESSING AS EXPECTED  Bed in low position, wheels locked, call light within reach, hourly rounding in place.    Problem: Pain Management  Goal: Pain level will decrease to patient's comfort goal  Outcome: PROGRESSING AS EXPECTED  Tramadol 50 mg Q6 hours PRN.

## 2019-06-03 NOTE — DISCHARGE INSTRUCTIONS
Follow up with wound clinic  Wednesday June 5, 2019 at 8:30 am  [address] [facility]  [address]  [place]  [phone]    They will send e-mail confirming your appointment.    F/u with PCP in 1 week.    Discharge Instructions    Discharged to other by taxi with self. Discharged via walking, hospital escort: Refused.  Special equipment needed: Not Applicable    Be sure to schedule a follow-up appointment with your primary care doctor or any specialists as instructed.     Discharge Plan:   Diet Plan: Discussed  Activity Level: Discussed  Confirmed Follow up Appointment: Appointment Scheduled  Confirmed Symptoms Management: Discussed  Medication Reconciliation Updated: Yes  Influenza Vaccine Indication: Not indicated: Previously immunized this influenza season and > 8 years of age    I understand that a diet low in cholesterol, fat, and sodium is recommended for good health. Unless I have been given specific instructions below for another diet, I accept this instruction as my diet prescription.   Other diet: regular    Special Instructions: None    · Is patient discharged on Warfarin / Coumadin?   No       Cellulitis, Adult  Introduction  Cellulitis is a skin infection. The infected area is usually red and sore. This condition occurs most often in the arms and lower legs. It is very important to get treated for this condition.  Follow these instructions at home:  · Take over-the-counter and prescription medicines only as told by your doctor.  · If you were prescribed an antibiotic medicine, take it as told by your doctor. Do not stop taking the antibiotic even if you start to feel better.  · Drink enough fluid to keep your pee (urine) clear or pale yellow.  · Do not touch or rub the infected area.  · Raise (elevate) the infected area above the level of your heart while you are sitting or lying down.  · Place warm or cold wet cloths (warm or cold compresses) on the infected area. Do this as told by your  doctor.  · Keep all follow-up visits as told by your doctor. This is important. These visits let your doctor make sure your infection is not getting worse.  Contact a doctor if:  · You have a fever.  · Your symptoms do not get better after 1-2 days of treatment.  · Your bone or joint under the infected area starts to hurt after the skin has healed.  · Your infection comes back. This can happen in the same area or another area.  · You have a swollen bump in the infected area.  · You have new symptoms.  · You feel ill and also have muscle aches and pains.  Get help right away if:  · Your symptoms get worse.  · You feel very sleepy.  · You throw up (vomit) or have watery poop (diarrhea) for a long time.  · There are red streaks coming from the infected area.  · Your red area gets larger.  · Your red area turns darker.  This information is not intended to replace advice given to you by your health care provider. Make sure you discuss any questions you have with your health care provider.  Document Released: 06/05/2009 Document Revised: 05/25/2017 Document Reviewed: 10/26/2016  © 2017 Elsevier      Depression / Suicide Risk    As you are discharged from this [facility] facility, it is important to learn how to keep safe from harming yourself.    Recognize the warning signs:  · Abrupt changes in personality, positive or negative- including increase in energy   · Giving away possessions  · Change in eating patterns- significant weight changes-  positive or negative  · Change in sleeping patterns- unable to sleep or sleeping all the time   · Unwillingness or inability to communicate  · Depression  · Unusual sadness, discouragement and loneliness  · Talk of wanting to die  · Neglect of personal appearance   · Rebelliousness- reckless behavior  · Withdrawal from people/activities they love  · Confusion- inability to concentrate     If you or a loved one observes any of these behaviors or has concerns about self-harm,  here's what you can do:  · Talk about it- your feelings and reasons for harming yourself  · Remove any means that you might use to hurt yourself (examples: pills, rope, extension cords, firearm)  · Get professional help from the community (Mental Health, Substance Abuse, psychological counseling)  · Do not be alone:Call your Safe Contact- someone whom you trust who will be there for you.  · Call your local CRISIS HOTLINE 658-1505 or 246-564-0274  · Call your local Children's Mobile Crisis Response Team [place] (530) 367-1656 or www.SuperGen  · Call the toll free National Suicide Prevention Hotlines   · National Suicide Prevention Lifeline 522-524-VRRD (3467)  · National Hope Line Network 800-SUICIDE (260-0864)

## 2019-06-03 NOTE — DISCHARGE PLANNING
Anticipated Discharge Disposition: Shelter with outpatient wound    Action: LSW received a call from Isabel at [facility] Pt is scheduled for 6/5/19 at 8:30 located at [address] [facility]    Barriers to Discharge: none    Plan: DC to the shelter

## 2019-06-03 NOTE — DISCHARGE PLANNING
Anticipated Discharge Disposition: outpatient wound    Action: LSW faxed order for outpatient wound to [facility]. Fx [phone]    Barriers to Discharge: wound appointment    Plan: follow up with outpatient wound at [facility]

## 2019-06-04 NOTE — DISCHARGE SUMMARY
Discharge Summary    CHIEF COMPLAINT ON ADMISSION  Chief Complaint   Patient presents with   • Leg Swelling     Pt has two large, year old, non-healing wounds on the anterior and medial side of his left lower leg. Left leg noted to be swollen, red and draining serosanguinous fluid. Pain rated 10/10       Reason for Admission  Leg Pain      Admission Date  5/26/2019    CODE STATUS  Full code    HPI & HOSPITAL COURSE  Please see original H&P for specific information, patient was admitted due to non healing wound on anterior and medial aspect of his left leg, patient was started on iv atb, wound cx were taken, wound care evaluated patient, patient blood cx neg, wound cx showed MRSA sensitive to bactrim, wound care recommended fu at wound clinic.   Patient also had alcohol withdrawal he was started on CIWA protocol patient required restrains  Then gradually improved now he is off restrains mental status is back to baseline, patient will be dc home today, sw arranged wound care appointment and provided bus pass, patient expressed understanding of the importance of wound care and to continue atb as well as f/u with PCP patient is alert oriented follows commands expressed understanding of his dc plan and agreed with it. All questions answered.        Therefore, he is discharged in fair and stable condition to home with close outpatient follow-up.    The patient met 2-midnight criteria for an inpatient stay at the time of discharge.    Discharge Date  6/3/2019    FOLLOW UP ITEMS POST DISCHARGE  PCP in 1 week  Wound clinic     DISCHARGE DIAGNOSES  Principal Problem:    Cellulitis POA: Unknown  Active Problems:    Tobacco abuse POA: Unknown    Alcohol use POA: Unknown    Anemia POA: Unknown  Resolved Problems:    * No resolved hospital problems. *      FOLLOW UP  No future appointments.  No follow-up provider specified.    MEDICATIONS ON DISCHARGE     Medication List      START taking these medications      Instructions    acetaminophen 325 MG Tabs  Commonly known as:  TYLENOL   Take 2 Tabs by mouth every 6 hours as needed (Mild Pain; (Pain scale 1-3); Temp greater than 100.5 F).  Dose:  650 mg     ascorbic acid 500 MG tablet  Start taking on:  6/4/2019  Commonly known as:  VITAMIN C   Take 1 Tab by mouth every day.  Dose:  500 mg     magnesium oxide 400 (241.3 Mg) MG Tabs tablet  Start taking on:  6/4/2019  Commonly known as:  MAG-OX   Take 1 Tab by mouth every day for 7 days.  Dose:  400 mg     sulfamethoxazole-trimethoprim 800-160 MG tablet  Commonly known as:  BACTRIM DS   Take 1 Tab by mouth every 12 hours for 7 days.  Dose:  1 Tab     therapeutic multivitamin-minerals Tabs  Start taking on:  6/4/2019   Take 1 Tab by mouth every day.  Dose:  1 Tab        STOP taking these medications    NON SPECIFIED            Allergies  No Known Allergies    DIET  Healthy diet    ACTIVITY  As tolerated.  Weight bearing as tolerated    CONSULTATIONS  None     PROCEDURES  none    LABORATORY  Lab Results   Component Value Date    SODIUM 138 05/29/2019    POTASSIUM 3.8 05/29/2019    CHLORIDE 105 05/29/2019    CO2 25 05/29/2019    GLUCOSE 102 (H) 05/29/2019    BUN 9 05/29/2019    CREATININE 0.77 05/29/2019        Lab Results   Component Value Date    WBC 6.1 05/29/2019    HEMOGLOBIN 10.2 (L) 06/01/2019    HEMATOCRIT 33.3 (L) 06/01/2019    PLATELETCT 493 (H) 05/29/2019        Total time of the discharge process exceeds 35 minutes.

## 2019-06-07 ENCOUNTER — APPOINTMENT (OUTPATIENT)
Dept: RADIOLOGY | Facility: MEDICAL CENTER | Age: 64
End: 2019-06-07
Attending: EMERGENCY MEDICINE
Payer: MEDICAID

## 2019-06-07 ENCOUNTER — HOSPITAL ENCOUNTER (EMERGENCY)
Facility: MEDICAL CENTER | Age: 64
End: 2019-06-07
Attending: EMERGENCY MEDICINE
Payer: MEDICAID

## 2019-06-07 VITALS
DIASTOLIC BLOOD PRESSURE: 72 MMHG | WEIGHT: 151.01 LBS | SYSTOLIC BLOOD PRESSURE: 124 MMHG | RESPIRATION RATE: 18 BRPM | TEMPERATURE: 98.3 F | HEIGHT: 71 IN | BODY MASS INDEX: 21.14 KG/M2 | OXYGEN SATURATION: 98 % | HEART RATE: 78 BPM

## 2019-06-07 DIAGNOSIS — Z51.89 ENCOUNTER FOR WOUND RE-CHECK: ICD-10-CM

## 2019-06-07 LAB
ALBUMIN SERPL BCP-MCNC: 3.9 G/DL (ref 3.2–4.9)
ALBUMIN/GLOB SERPL: 1 G/DL
ALP SERPL-CCNC: 101 U/L (ref 30–99)
ALT SERPL-CCNC: 40 U/L (ref 2–50)
ANION GAP SERPL CALC-SCNC: 13 MMOL/L (ref 0–11.9)
AST SERPL-CCNC: 45 U/L (ref 12–45)
BASOPHILS # BLD AUTO: 0.7 % (ref 0–1.8)
BASOPHILS # BLD: 0.06 K/UL (ref 0–0.12)
BILIRUB SERPL-MCNC: 0.4 MG/DL (ref 0.1–1.5)
BUN SERPL-MCNC: 15 MG/DL (ref 8–22)
BURR CELLS BLD QL SMEAR: NORMAL
CALCIUM SERPL-MCNC: 8.8 MG/DL (ref 8.5–10.5)
CHLORIDE SERPL-SCNC: 104 MMOL/L (ref 96–112)
CO2 SERPL-SCNC: 16 MMOL/L (ref 20–33)
COMMENT 1642: NORMAL
CREAT SERPL-MCNC: 0.7 MG/DL (ref 0.5–1.4)
EOSINOPHIL # BLD AUTO: 0.07 K/UL (ref 0–0.51)
EOSINOPHIL NFR BLD: 0.8 % (ref 0–6.9)
ERYTHROCYTE [DISTWIDTH] IN BLOOD BY AUTOMATED COUNT: 49.4 FL (ref 35.9–50)
GLOBULIN SER CALC-MCNC: 3.9 G/DL (ref 1.9–3.5)
GLUCOSE SERPL-MCNC: 120 MG/DL (ref 65–99)
HCT VFR BLD AUTO: 36.4 % (ref 42–52)
HGB BLD-MCNC: 10.9 G/DL (ref 14–18)
IMM GRANULOCYTES # BLD AUTO: 0.05 K/UL (ref 0–0.11)
IMM GRANULOCYTES NFR BLD AUTO: 0.6 % (ref 0–0.9)
LYMPHOCYTES # BLD AUTO: 1.43 K/UL (ref 1–4.8)
LYMPHOCYTES NFR BLD: 15.9 % (ref 22–41)
MCH RBC QN AUTO: 25.1 PG (ref 27–33)
MCHC RBC AUTO-ENTMCNC: 29.9 G/DL (ref 33.7–35.3)
MCV RBC AUTO: 83.9 FL (ref 81.4–97.8)
MONOCYTES # BLD AUTO: 1.02 K/UL (ref 0–0.85)
MONOCYTES NFR BLD AUTO: 11.3 % (ref 0–13.4)
MORPHOLOGY BLD-IMP: NORMAL
NEUTROPHILS # BLD AUTO: 6.38 K/UL (ref 1.82–7.42)
NEUTROPHILS NFR BLD: 70.7 % (ref 44–72)
NRBC # BLD AUTO: 0 K/UL
NRBC BLD-RTO: 0 /100 WBC
PLATELET # BLD AUTO: 526 K/UL (ref 164–446)
PLATELET BLD QL SMEAR: NORMAL
PMV BLD AUTO: 8.8 FL (ref 9–12.9)
POIKILOCYTOSIS BLD QL SMEAR: NORMAL
POTASSIUM SERPL-SCNC: 3.9 MMOL/L (ref 3.6–5.5)
PROT SERPL-MCNC: 7.8 G/DL (ref 6–8.2)
RBC # BLD AUTO: 4.34 M/UL (ref 4.7–6.1)
RBC BLD AUTO: PRESENT
SODIUM SERPL-SCNC: 133 MMOL/L (ref 135–145)
WBC # BLD AUTO: 9 K/UL (ref 4.8–10.8)

## 2019-06-07 PROCEDURE — A9270 NON-COVERED ITEM OR SERVICE: HCPCS | Performed by: EMERGENCY MEDICINE

## 2019-06-07 PROCEDURE — 700102 HCHG RX REV CODE 250 W/ 637 OVERRIDE(OP): Performed by: EMERGENCY MEDICINE

## 2019-06-07 PROCEDURE — 73590 X-RAY EXAM OF LOWER LEG: CPT | Mod: LT

## 2019-06-07 PROCEDURE — 85025 COMPLETE CBC W/AUTO DIFF WBC: CPT

## 2019-06-07 PROCEDURE — 93971 EXTREMITY STUDY: CPT | Mod: LT

## 2019-06-07 PROCEDURE — 80053 COMPREHEN METABOLIC PANEL: CPT

## 2019-06-07 PROCEDURE — 99284 EMERGENCY DEPT VISIT MOD MDM: CPT

## 2019-06-07 RX ORDER — CEPHALEXIN 500 MG/1
500 CAPSULE ORAL ONCE
Status: COMPLETED | OUTPATIENT
Start: 2019-06-07 | End: 2019-06-07

## 2019-06-07 RX ORDER — SULFAMETHOXAZOLE AND TRIMETHOPRIM 800; 160 MG/1; MG/1
1 TABLET ORAL 2 TIMES DAILY
Qty: 20 TAB | Refills: 0 | Status: SHIPPED | OUTPATIENT
Start: 2019-06-07 | End: 2019-06-17

## 2019-06-07 RX ORDER — SULFAMETHOXAZOLE AND TRIMETHOPRIM 800; 160 MG/1; MG/1
1 TABLET ORAL ONCE
Status: COMPLETED | OUTPATIENT
Start: 2019-06-07 | End: 2019-06-07

## 2019-06-07 RX ADMIN — SULFAMETHOXAZOLE AND TRIMETHOPRIM 1 TABLET: 800; 160 TABLET ORAL at 12:00

## 2019-06-07 RX ADMIN — CEPHALEXIN 500 MG: 500 CAPSULE ORAL at 12:00

## 2019-06-07 NOTE — DISCHARGE INSTRUCTIONS
You need to establish a primary care doctor, go into the [facility] clinic or call today and arrange office follow-up in a primary care doctor as soon as possible.  You also need to arrange follow-up at the wound clinic.  If you have Medicaid you can contact Medicaid and they may be able to provide you with transportation services for your medical appointments.  Return here if you have a medical emergency.  You need to keep the wound clean and protected and change the bandages daily and wash the wound with soap and water daily.

## 2019-06-07 NOTE — ED TRIAGE NOTES
"Pt to triage, c/o lt calf / leg wound , pt states he was seen at [facility] on Saturday and \" they just sent me away\" , pt presents with a large open wound to lt calf up to lt knee , fell weeks ago , and wound progressively getting worse, c/o increased pain and lt lower ext swelling   "

## 2019-06-09 NOTE — ED PROVIDER NOTES
ED Provider Note    CHIEF COMPLAINT  Chief Complaint   Patient presents with   • Wound Infection     lt calf        HPI  Bola Varela is a 63 y.o. male who presents to the emergency department complaining of a wound on the left inner thigh and lower leg.  This wound is chronic the patient said that this happened months ago when he tripped and fell and sustained a very large laceration on his leg.  Initially the patient told me he had never sought medical care for this however chart review shows that he has had ER visits regarding this and he apparently was just seen at [facility] yesterday.  The patient has been told that he needs to go to wound care but he is not doing this he is homeless and [facility] and says he has problems with transportation.  He does have Medicaid he has not availed himself of [facility] transportation.    REVIEW OF SYSTEMS no recent injury no fever chills nausea vomiting chest pain difficulty breathing no pus or discharge from the wound.  All other systems negative    PAST MEDICAL HISTORY  Past Medical History:   Diagnosis Date   • Psychiatric problem    • Restless leg    • Tobacco use        FAMILY HISTORY  Family History   Problem Relation Age of Onset   • Heart Disease Mother    • No Known Problems Father    • Cancer Neg Hx    • Diabetes Neg Hx    • Stroke Neg Hx        SOCIAL HISTORY  Social History     Social History   • Marital status: Single     Spouse name: N/A   • Number of children: N/A   • Years of education: N/A     Social History Main Topics   • Smoking status: Light Tobacco Smoker     Years: 40.00     Types: Cigarettes   • Smokeless tobacco: Never Used      Comment: 1 ppd until few years ago   • Alcohol use Yes      Comment: occ   • Drug use: No   • Sexual activity: Not Currently     Partners: Female     Other Topics Concern   • Not on file     Social History Narrative   • No narrative on file       SURGICAL HISTORY  Past Surgical History:   Procedure  "Laterality Date   • ANKLE ORIF Right        CURRENT MEDICATIONS  Home Medications     Reviewed by Kenrick Drew R.N. (Registered Nurse) on 06/07/19 at 0954  Med List Status: Partial   Medication Last Dose Status   acetaminophen (TYLENOL) 325 MG Tab  Active   ascorbic acid (VITAMIN C) 500 MG tablet  Active   magnesium oxide (MAG-OX) 400 (241.3 Mg) MG Tab tablet  Active   therapeutic multivitamin-minerals (THERAGRAN-M) Tab  Active                ALLERGIES  No Known Allergies    PHYSICAL EXAM  VITAL SIGNS: /72   Pulse 78   Temp 36.8 °C (98.3 °F) (Temporal)   Resp 18   Ht 1.803 m (5' 11\")   Wt 68.5 kg (151 lb 0.2 oz)   SpO2 98%   BMI 21.06 kg/m²    Oxygen saturation is interpreted as adequate  Constitutional: Awake verbal he does not appear toxic or distressed  HENT: No sign of acute trauma to the head  Eyes: No erythema discharge or jaundice  Neck: Trachea midline no JVD  Cardiovascular: Regular rate and rhythm  Lungs: Clear and equal bilaterally  Skin: Warm and dry  Musculoskeletal: No acute bony deformity there is a large chronic appearing wound on the medial aspect of the left leg this appears very dried out and superficial it does appear that there has been significant healing over time I do not see any pus or discharge there may be some very minimal erythema of the leg and slight edema diffusely of the lower leg..  The patient has an easily palpable dorsalis pedis pulse  Neurologic: Awake verbal and moving all extremities    Laboratory  CBC shows white blood cell count of 9.0 hemoglobin is adequate at 10.9 basic metabolic panel shows a slightly low bicarb of 16 and blood sugar of 120 LFTs are unremarkable    Radiology  US-EXTREMITY VENOUS LOWER UNILAT LEFT   Final Result      DX-TIBIA AND FIBULA LEFT   Final Result      1.  No acute osseous abnormality.   2.  Postsurgical changes of the tibia.   3.  Healed posttraumatic deformity of the tibia and fibula.        MEDICAL DECISION MAKING and " DISPOSITION  I reviewed all the findings with the patient I started him on oral Keflex for the possibility of early infection of this chronic wound.  I have advised the patient that he needs to go to the wound clinic and I have spoken with  and apparently the patient may qualify for MTM transportation through Medicaid but he needs to call and make these arrangements himself.  I have advised the patient that he is to go to the [facility] as soon as possible and arrange a primary care doctor and follow-up and otherwise he is to keep the wound clean and protected.  I written a prescription for Keflex and he is to wash the wound daily with soap and water and change a dressing so that the wound stays clean if the patient feels he is having new or worsening symptoms or requires emergency medical care he is to return here    IMPRESSION  1.  Chronic wound left leg  2.  Homelessness         Electronically signed by: Wiliam Farah, 6/8/2019 5:05 PM

## 2019-06-11 NOTE — DOCUMENTATION QUERY
[facility]                                                                       Query Response Note      PATIENT:               GRUPO GANN  ACCT #:                  [id number]  MRN:                     [id number]  :                      1955  ADMIT DATE:       2019 10:29 PM  DISCH DATE:        6/3/2019 4:25 PM  RESPONDING  PROVIDER #:        [id number]           QUERY TEXT:    Alcohol withdrawal requiring CIWA protocol is documented in the Medical Record. Clarification of the patient's status is required.    NOTE:  If an appropriate response is not listed below, please respond with a new note.        The patient's Clinical Indicators include:  Hx of alcohol use  No signs of withdrawal on admission  Rally bag started on admission  CIWA score elevates /30, requiring restraints    Query created by: Sapna Wong on 6/10/2019 10:16 AM    RESPONSE TEXT:    Alcohol dependence with withdrawal          Electronically signed by:  PAN JOSHUA MD 2019 7:26 AM

## 2019-06-25 ENCOUNTER — HOSPITAL ENCOUNTER (INPATIENT)
Dept: HOSPITAL 8 - ED | Age: 64
LOS: 4 days | Discharge: LEFT BEFORE DISCHARGE | DRG: 603 | End: 2019-06-29
Attending: HOSPITALIST | Admitting: HOSPITALIST
Payer: MEDICAID

## 2019-06-25 VITALS — DIASTOLIC BLOOD PRESSURE: 84 MMHG | SYSTOLIC BLOOD PRESSURE: 126 MMHG

## 2019-06-25 VITALS — WEIGHT: 152.12 LBS | BODY MASS INDEX: 21.3 KG/M2 | HEIGHT: 71 IN

## 2019-06-25 VITALS — DIASTOLIC BLOOD PRESSURE: 70 MMHG | SYSTOLIC BLOOD PRESSURE: 127 MMHG
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
## 2019-06-25 DIAGNOSIS — Z86.14: ICD-10-CM

## 2019-06-25 DIAGNOSIS — Z91.19: ICD-10-CM
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
## 2019-06-25 DIAGNOSIS — F43.21: ICD-10-CM

## 2019-06-25 DIAGNOSIS — L03.116: Primary | ICD-10-CM

## 2019-06-25 DIAGNOSIS — Z87.891: ICD-10-CM

## 2019-06-25 DIAGNOSIS — J44.9: ICD-10-CM

## 2019-06-25 DIAGNOSIS — K21.9: ICD-10-CM

## 2019-06-25 DIAGNOSIS — Z59.0: ICD-10-CM

## 2019-06-25 DIAGNOSIS — D50.9: ICD-10-CM

## 2019-06-25 DIAGNOSIS — Z53.21: ICD-10-CM

## 2019-06-25 DIAGNOSIS — I10: ICD-10-CM
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
## 2019-06-25 DIAGNOSIS — Z82.49: ICD-10-CM

## 2019-06-25 LAB
ALBUMIN SERPL-MCNC: 2.6 G/DL (ref 3.4–5)
ALP SERPL-CCNC: 116 U/L (ref 45–117)
ALT SERPL-CCNC: 22 U/L (ref 12–78)
ANION GAP SERPL CALC-SCNC: 9 MMOL/L (ref 5–15)
BASOPHILS # BLD AUTO: 0.04 X10^3/UL (ref 0–0.1)
BASOPHILS NFR BLD AUTO: 0 % (ref 0–1)
BILIRUB SERPL-MCNC: 0.2 MG/DL (ref 0.2–1)
CALCIUM SERPL-MCNC: 8.4 MG/DL (ref 8.5–10.1)
CHLORIDE SERPL-SCNC: 103 MMOL/L (ref 98–107)
CREAT SERPL-MCNC: 0.79 MG/DL (ref 0.7–1.3)
CULTURE INDICATED?: YES
EOSINOPHIL # BLD AUTO: 0.08 X10^3/UL (ref 0–0.4)
EOSINOPHIL NFR BLD AUTO: 1 % (ref 1–7)
ERYTHROCYTE [DISTWIDTH] IN BLOOD BY AUTOMATED COUNT: 17.9 % (ref 9.4–14.8)
LYMPHOCYTES # BLD AUTO: 1.7 X10^3/UL (ref 1–3.4)
LYMPHOCYTES NFR BLD AUTO: 19 % (ref 22–44)
MCH RBC QN AUTO: 25.7 PG (ref 27.5–34.5)
MCHC RBC AUTO-ENTMCNC: 31.2 G/DL (ref 33.2–36.2)
MCV RBC AUTO: 82.5 FL (ref 81–97)
MD: NO
MICROSCOPIC: (no result)
MONOCYTES # BLD AUTO: 1.21 X10^3/UL (ref 0.2–0.8)
MONOCYTES NFR BLD AUTO: 14 % (ref 2–9)
NEUTROPHILS # BLD AUTO: 5.89 X10^3/UL (ref 1.8–6.8)
NEUTROPHILS NFR BLD AUTO: 66 % (ref 42–75)
PLATELET # BLD AUTO: 488 X10^3/UL (ref 130–400)
PMV BLD AUTO: 7 FL (ref 7.4–10.4)
PROT SERPL-MCNC: 8.2 G/DL (ref 6.4–8.2)
RBC # BLD AUTO: 4.16 X10^6/UL (ref 4.38–5.82)
T4 FREE SERPL-MCNC: 1.16 NG/DL (ref 0.76–1.46)
TSH SERPL-ACNC: 0.79 MIU/L (ref 0.36–3.74)

## 2019-06-25 PROCEDURE — 36415 COLL VENOUS BLD VENIPUNCTURE: CPT

## 2019-06-25 PROCEDURE — 87205 SMEAR GRAM STAIN: CPT

## 2019-06-25 PROCEDURE — 93005 ELECTROCARDIOGRAM TRACING: CPT

## 2019-06-25 PROCEDURE — 81001 URINALYSIS AUTO W/SCOPE: CPT

## 2019-06-25 PROCEDURE — 87077 CULTURE AEROBIC IDENTIFY: CPT

## 2019-06-25 PROCEDURE — 83735 ASSAY OF MAGNESIUM: CPT

## 2019-06-25 PROCEDURE — 84100 ASSAY OF PHOSPHORUS: CPT

## 2019-06-25 PROCEDURE — 80048 BASIC METABOLIC PNL TOTAL CA: CPT

## 2019-06-25 PROCEDURE — B5181ZA FLUOROSCOPY OF SUPERIOR VENA CAVA USING LOW OSMOLAR CONTRAST, GUIDANCE: ICD-10-PCS | Performed by: RADIOLOGY

## 2019-06-25 PROCEDURE — 71045 X-RAY EXAM CHEST 1 VIEW: CPT

## 2019-06-25 PROCEDURE — 83605 ASSAY OF LACTIC ACID: CPT

## 2019-06-25 PROCEDURE — C1751 CATH, INF, PER/CENT/MIDLINE: HCPCS

## 2019-06-25 PROCEDURE — 36573 INSJ PICC RS&I 5 YR+: CPT

## 2019-06-25 PROCEDURE — 87086 URINE CULTURE/COLONY COUNT: CPT

## 2019-06-25 PROCEDURE — B548ZZA ULTRASONOGRAPHY OF SUPERIOR VENA CAVA, GUIDANCE: ICD-10-PCS | Performed by: RADIOLOGY

## 2019-06-25 PROCEDURE — 87147 CULTURE TYPE IMMUNOLOGIC: CPT

## 2019-06-25 PROCEDURE — 85651 RBC SED RATE NONAUTOMATED: CPT

## 2019-06-25 PROCEDURE — 87040 BLOOD CULTURE FOR BACTERIA: CPT

## 2019-06-25 PROCEDURE — 02HV33Z INSERTION OF INFUSION DEVICE INTO SUPERIOR VENA CAVA, PERCUTANEOUS APPROACH: ICD-10-PCS | Performed by: RADIOLOGY

## 2019-06-25 PROCEDURE — 87075 CULTR BACTERIA EXCEPT BLOOD: CPT

## 2019-06-25 PROCEDURE — 80202 ASSAY OF VANCOMYCIN: CPT

## 2019-06-25 PROCEDURE — 86140 C-REACTIVE PROTEIN: CPT

## 2019-06-25 PROCEDURE — 84145 PROCALCITONIN (PCT): CPT

## 2019-06-25 PROCEDURE — 87070 CULTURE OTHR SPECIMN AEROBIC: CPT

## 2019-06-25 PROCEDURE — 85025 COMPLETE CBC W/AUTO DIFF WBC: CPT

## 2019-06-25 PROCEDURE — 87186 SC STD MICRODIL/AGAR DIL: CPT

## 2019-06-25 PROCEDURE — 84443 ASSAY THYROID STIM HORMONE: CPT

## 2019-06-25 PROCEDURE — 84439 ASSAY OF FREE THYROXINE: CPT

## 2019-06-25 PROCEDURE — 80053 COMPREHEN METABOLIC PANEL: CPT

## 2019-06-25 RX ADMIN — SODIUM CHLORIDE, PRESERVATIVE FREE PRN ML: 5 INJECTION INTRAVENOUS at 17:34

## 2019-06-25 RX ADMIN — VANCOMYCIN HYDROCHLORIDE SCH MLS/HR: 1 INJECTION, POWDER, LYOPHILIZED, FOR SOLUTION INTRAVENOUS at 19:47

## 2019-06-25 RX ADMIN — ENOXAPARIN SODIUM SCH MG: 40 INJECTION SUBCUTANEOUS at 18:00

## 2019-06-25 RX ADMIN — SODIUM CHLORIDE, PRESERVATIVE FREE PRN ML: 5 INJECTION INTRAVENOUS at 16:03

## 2019-06-25 RX ADMIN — FAMOTIDINE SCH MG: 20 TABLET, FILM COATED ORAL at 19:53

## 2019-06-25 RX ADMIN — ACETAMINOPHEN PRN MG: 325 TABLET, FILM COATED ORAL at 18:26

## 2019-06-25 RX ADMIN — TAZOBACTAM SODIUM AND PIPERACILLIN SODIUM SCH MLS/HR: 375; 3 INJECTION, SOLUTION INTRAVENOUS at 18:25

## 2019-06-25 SDOH — ECONOMIC STABILITY - HOUSING INSECURITY: HOMELESSNESS: Z59.0

## 2019-06-26 VITALS — DIASTOLIC BLOOD PRESSURE: 82 MMHG | SYSTOLIC BLOOD PRESSURE: 139 MMHG

## 2019-06-26 VITALS — SYSTOLIC BLOOD PRESSURE: 141 MMHG | DIASTOLIC BLOOD PRESSURE: 85 MMHG

## 2019-06-26 VITALS — DIASTOLIC BLOOD PRESSURE: 84 MMHG | SYSTOLIC BLOOD PRESSURE: 138 MMHG

## 2019-06-26 VITALS — DIASTOLIC BLOOD PRESSURE: 92 MMHG | SYSTOLIC BLOOD PRESSURE: 145 MMHG

## 2019-06-26 LAB
ANION GAP SERPL CALC-SCNC: 5 MMOL/L (ref 5–15)
BASOPHILS # BLD AUTO: 0.03 X10^3/UL (ref 0–0.1)
BASOPHILS NFR BLD AUTO: 0 % (ref 0–1)
CALCIUM SERPL-MCNC: 8 MG/DL (ref 8.5–10.1)
CHLORIDE SERPL-SCNC: 104 MMOL/L (ref 98–107)
CREAT SERPL-MCNC: 0.77 MG/DL (ref 0.7–1.3)
CRP SERPL-MCNC: 8.2 MG/DL (ref 0.02–0.49)
EOSINOPHIL # BLD AUTO: 0.1 X10^3/UL (ref 0–0.4)
EOSINOPHIL NFR BLD AUTO: 1 % (ref 1–7)
ERYTHROCYTE [DISTWIDTH] IN BLOOD BY AUTOMATED COUNT: 17.9 % (ref 9.4–14.8)
ERYTHROCYTE [SEDIMENTATION RATE] IN BLOOD BY WESTERGREN METHOD: 87 MM/HR (ref 0–10)
HCT (SEDRATE): 28.9 % (ref 39.2–51.8)
LYMPHOCYTES # BLD AUTO: 0.86 X10^3/UL (ref 1–3.4)
LYMPHOCYTES NFR BLD AUTO: 12 % (ref 22–44)
MCH RBC QN AUTO: 26 PG (ref 27.5–34.5)
MCHC RBC AUTO-ENTMCNC: 31.9 G/DL (ref 33.2–36.2)
MCV RBC AUTO: 81.6 FL (ref 81–97)
MD: (no result)
MONOCYTES # BLD AUTO: 0.97 X10^3/UL (ref 0.2–0.8)
MONOCYTES NFR BLD AUTO: 13 % (ref 2–9)
NEUTROPHILS # BLD AUTO: 5.36 X10^3/UL (ref 1.8–6.8)
NEUTROPHILS NFR BLD AUTO: 73 % (ref 42–75)
PLATELET # BLD AUTO: 411 X10^3/UL (ref 130–400)
PMV BLD AUTO: 7.1 FL (ref 7.4–10.4)
RBC # BLD AUTO: 3.55 X10^6/UL (ref 4.38–5.82)

## 2019-06-26 RX ADMIN — VANCOMYCIN HYDROCHLORIDE SCH MLS/HR: 1 INJECTION, POWDER, LYOPHILIZED, FOR SOLUTION INTRAVENOUS at 19:11

## 2019-06-26 RX ADMIN — TAZOBACTAM SODIUM AND PIPERACILLIN SODIUM SCH MLS/HR: 375; 3 INJECTION, SOLUTION INTRAVENOUS at 12:52

## 2019-06-26 RX ADMIN — FAMOTIDINE SCH MG: 20 TABLET, FILM COATED ORAL at 20:50

## 2019-06-26 RX ADMIN — FAMOTIDINE SCH MG: 20 TABLET, FILM COATED ORAL at 08:52

## 2019-06-26 RX ADMIN — TAZOBACTAM SODIUM AND PIPERACILLIN SODIUM SCH MLS/HR: 375; 3 INJECTION, SOLUTION INTRAVENOUS at 17:53

## 2019-06-26 RX ADMIN — ENOXAPARIN SODIUM SCH MG: 40 INJECTION SUBCUTANEOUS at 17:54

## 2019-06-26 RX ADMIN — TAZOBACTAM SODIUM AND PIPERACILLIN SODIUM SCH MLS/HR: 375; 3 INJECTION, SOLUTION INTRAVENOUS at 04:56

## 2019-06-26 RX ADMIN — TAZOBACTAM SODIUM AND PIPERACILLIN SODIUM SCH MLS/HR: 375; 3 INJECTION, SOLUTION INTRAVENOUS at 00:34

## 2019-06-27 VITALS — DIASTOLIC BLOOD PRESSURE: 86 MMHG | SYSTOLIC BLOOD PRESSURE: 137 MMHG

## 2019-06-27 VITALS — DIASTOLIC BLOOD PRESSURE: 91 MMHG | SYSTOLIC BLOOD PRESSURE: 150 MMHG

## 2019-06-27 VITALS — SYSTOLIC BLOOD PRESSURE: 152 MMHG | DIASTOLIC BLOOD PRESSURE: 82 MMHG

## 2019-06-27 VITALS — DIASTOLIC BLOOD PRESSURE: 85 MMHG | SYSTOLIC BLOOD PRESSURE: 145 MMHG

## 2019-06-27 LAB
ANION GAP SERPL CALC-SCNC: 7 MMOL/L (ref 5–15)
BASOPHILS # BLD AUTO: 0.06 X10^3/UL (ref 0–0.1)
BASOPHILS NFR BLD AUTO: 1 % (ref 0–1)
CALCIUM SERPL-MCNC: 8.6 MG/DL (ref 8.5–10.1)
CHLORIDE SERPL-SCNC: 105 MMOL/L (ref 98–107)
CREAT SERPL-MCNC: 0.92 MG/DL (ref 0.7–1.3)
EOSINOPHIL # BLD AUTO: 0.14 X10^3/UL (ref 0–0.4)
EOSINOPHIL NFR BLD AUTO: 2 % (ref 1–7)
ERYTHROCYTE [DISTWIDTH] IN BLOOD BY AUTOMATED COUNT: 17.9 % (ref 9.4–14.8)
LYMPHOCYTES # BLD AUTO: 1.04 X10^3/UL (ref 1–3.4)
LYMPHOCYTES NFR BLD AUTO: 17 % (ref 22–44)
MCH RBC QN AUTO: 25.1 PG (ref 27.5–34.5)
MCHC RBC AUTO-ENTMCNC: 31 G/DL (ref 33.2–36.2)
MCV RBC AUTO: 81.1 FL (ref 81–97)
MD: NO
MONOCYTES # BLD AUTO: 0.97 X10^3/UL (ref 0.2–0.8)
MONOCYTES NFR BLD AUTO: 16 % (ref 2–9)
NEUTROPHILS # BLD AUTO: 4.05 X10^3/UL (ref 1.8–6.8)
NEUTROPHILS NFR BLD AUTO: 65 % (ref 42–75)
PLATELET # BLD AUTO: 478 X10^3/UL (ref 130–400)
PMV BLD AUTO: 6.7 FL (ref 7.4–10.4)
RBC # BLD AUTO: 3.91 X10^6/UL (ref 4.38–5.82)

## 2019-06-27 RX ADMIN — TAZOBACTAM SODIUM AND PIPERACILLIN SODIUM SCH MLS/HR: 375; 3 INJECTION, SOLUTION INTRAVENOUS at 06:13

## 2019-06-27 RX ADMIN — TAZOBACTAM SODIUM AND PIPERACILLIN SODIUM SCH MLS/HR: 375; 3 INJECTION, SOLUTION INTRAVENOUS at 23:45

## 2019-06-27 RX ADMIN — TAZOBACTAM SODIUM AND PIPERACILLIN SODIUM SCH MLS/HR: 375; 3 INJECTION, SOLUTION INTRAVENOUS at 18:19

## 2019-06-27 RX ADMIN — ENOXAPARIN SODIUM SCH MG: 40 INJECTION SUBCUTANEOUS at 15:57

## 2019-06-27 RX ADMIN — TAZOBACTAM SODIUM AND PIPERACILLIN SODIUM SCH MLS/HR: 375; 3 INJECTION, SOLUTION INTRAVENOUS at 00:08

## 2019-06-27 RX ADMIN — ACETAMINOPHEN PRN MG: 325 TABLET, FILM COATED ORAL at 20:15

## 2019-06-27 RX ADMIN — FAMOTIDINE SCH MG: 20 TABLET, FILM COATED ORAL at 12:07

## 2019-06-27 RX ADMIN — FAMOTIDINE SCH MG: 20 TABLET, FILM COATED ORAL at 20:15

## 2019-06-27 RX ADMIN — ACETAMINOPHEN PRN MG: 325 TABLET, FILM COATED ORAL at 01:47

## 2019-06-27 RX ADMIN — VANCOMYCIN HYDROCHLORIDE SCH MLS/HR: 1 INJECTION, POWDER, LYOPHILIZED, FOR SOLUTION INTRAVENOUS at 19:27

## 2019-06-27 RX ADMIN — TAZOBACTAM SODIUM AND PIPERACILLIN SODIUM SCH MLS/HR: 375; 3 INJECTION, SOLUTION INTRAVENOUS at 12:06

## 2019-06-28 VITALS — DIASTOLIC BLOOD PRESSURE: 80 MMHG | SYSTOLIC BLOOD PRESSURE: 163 MMHG

## 2019-06-28 VITALS — SYSTOLIC BLOOD PRESSURE: 113 MMHG | DIASTOLIC BLOOD PRESSURE: 80 MMHG

## 2019-06-28 VITALS — DIASTOLIC BLOOD PRESSURE: 89 MMHG | SYSTOLIC BLOOD PRESSURE: 144 MMHG

## 2019-06-28 VITALS — SYSTOLIC BLOOD PRESSURE: 148 MMHG | DIASTOLIC BLOOD PRESSURE: 81 MMHG

## 2019-06-28 LAB
ANION GAP SERPL CALC-SCNC: 7 MMOL/L (ref 5–15)
BASOPHILS # BLD AUTO: 0.07 X10^3/UL (ref 0–0.1)
BASOPHILS NFR BLD AUTO: 1 % (ref 0–1)
CALCIUM SERPL-MCNC: 8.2 MG/DL (ref 8.5–10.1)
CHLORIDE SERPL-SCNC: 108 MMOL/L (ref 98–107)
CREAT SERPL-MCNC: 0.81 MG/DL (ref 0.7–1.3)
EOSINOPHIL # BLD AUTO: 0.2 X10^3/UL (ref 0–0.4)
EOSINOPHIL NFR BLD AUTO: 4 % (ref 1–7)
ERYTHROCYTE [DISTWIDTH] IN BLOOD BY AUTOMATED COUNT: 17.2 % (ref 9.4–14.8)
LYMPHOCYTES # BLD AUTO: 1.2 X10^3/UL (ref 1–3.4)
LYMPHOCYTES NFR BLD AUTO: 24 % (ref 22–44)
MCH RBC QN AUTO: 26 PG (ref 27.5–34.5)
MCHC RBC AUTO-ENTMCNC: 31.7 G/DL (ref 33.2–36.2)
MCV RBC AUTO: 82 FL (ref 81–97)
MD: NO
MONOCYTES # BLD AUTO: 0.89 X10^3/UL (ref 0.2–0.8)
MONOCYTES NFR BLD AUTO: 18 % (ref 2–9)
NEUTROPHILS # BLD AUTO: 2.7 X10^3/UL (ref 1.8–6.8)
NEUTROPHILS NFR BLD AUTO: 53 % (ref 42–75)
PLATELET # BLD AUTO: 528 X10^3/UL (ref 130–400)
PMV BLD AUTO: 6.9 FL (ref 7.4–10.4)
RBC # BLD AUTO: 3.71 X10^6/UL (ref 4.38–5.82)

## 2019-06-28 RX ADMIN — TAZOBACTAM SODIUM AND PIPERACILLIN SODIUM SCH MLS/HR: 375; 3 INJECTION, SOLUTION INTRAVENOUS at 12:15

## 2019-06-28 RX ADMIN — ENOXAPARIN SODIUM SCH MG: 40 INJECTION SUBCUTANEOUS at 17:23

## 2019-06-28 RX ADMIN — FAMOTIDINE SCH MG: 20 TABLET, FILM COATED ORAL at 09:08

## 2019-06-28 RX ADMIN — VANCOMYCIN HYDROCHLORIDE SCH MLS/HR: 1 INJECTION, POWDER, LYOPHILIZED, FOR SOLUTION INTRAVENOUS at 19:48

## 2019-06-28 RX ADMIN — TAZOBACTAM SODIUM AND PIPERACILLIN SODIUM SCH MLS/HR: 375; 3 INJECTION, SOLUTION INTRAVENOUS at 06:09

## 2019-06-28 RX ADMIN — ACETAMINOPHEN PRN MG: 325 TABLET, FILM COATED ORAL at 20:41

## 2019-06-28 RX ADMIN — FAMOTIDINE SCH MG: 20 TABLET, FILM COATED ORAL at 19:47

## 2019-06-28 RX ADMIN — TAZOBACTAM SODIUM AND PIPERACILLIN SODIUM SCH MLS/HR: 375; 3 INJECTION, SOLUTION INTRAVENOUS at 18:12

## 2019-06-29 VITALS — SYSTOLIC BLOOD PRESSURE: 155 MMHG | DIASTOLIC BLOOD PRESSURE: 102 MMHG

## 2019-06-29 LAB
ALBUMIN SERPL-MCNC: 2.7 G/DL (ref 3.4–5)
ALP SERPL-CCNC: 91 U/L (ref 45–117)
ALT SERPL-CCNC: 18 U/L (ref 12–78)
ANION GAP SERPL CALC-SCNC: 8 MMOL/L (ref 5–15)
BASOPHILS # BLD AUTO: 0.06 X10^3/UL (ref 0–0.1)
BASOPHILS NFR BLD AUTO: 1 % (ref 0–1)
BILIRUB SERPL-MCNC: 0.2 MG/DL (ref 0.2–1)
CALCIUM SERPL-MCNC: 8.7 MG/DL (ref 8.5–10.1)
CHLORIDE SERPL-SCNC: 104 MMOL/L (ref 98–107)
CREAT SERPL-MCNC: 1.1 MG/DL (ref 0.7–1.3)
CRP SERPL-MCNC: 1.9 MG/DL (ref 0.02–0.49)
EOSINOPHIL # BLD AUTO: 0.15 X10^3/UL (ref 0–0.4)
EOSINOPHIL NFR BLD AUTO: 3 % (ref 1–7)
ERYTHROCYTE [DISTWIDTH] IN BLOOD BY AUTOMATED COUNT: 17.4 % (ref 9.4–14.8)
ERYTHROCYTE [SEDIMENTATION RATE] IN BLOOD BY WESTERGREN METHOD: 83 MM/HR (ref 0–10)
HCT (SEDRATE): 32.4 % (ref 39.2–51.8)
LYMPHOCYTES # BLD AUTO: 1.35 X10^3/UL (ref 1–3.4)
LYMPHOCYTES NFR BLD AUTO: 28 % (ref 22–44)
MCH RBC QN AUTO: 25.1 PG (ref 27.5–34.5)
MCHC RBC AUTO-ENTMCNC: 31 G/DL (ref 33.2–36.2)
MCV RBC AUTO: 81 FL (ref 81–97)
MD: NO
MONOCYTES # BLD AUTO: 0.86 X10^3/UL (ref 0.2–0.8)
MONOCYTES NFR BLD AUTO: 18 % (ref 2–9)
NEUTROPHILS # BLD AUTO: 2.41 X10^3/UL (ref 1.8–6.8)
NEUTROPHILS NFR BLD AUTO: 50 % (ref 42–75)
PLATELET # BLD AUTO: 568 X10^3/UL (ref 130–400)
PMV BLD AUTO: 6.8 FL (ref 7.4–10.4)
PROT SERPL-MCNC: 8.1 G/DL (ref 6.4–8.2)
RBC # BLD AUTO: 3.99 X10^6/UL (ref 4.38–5.82)

## 2019-06-29 RX ADMIN — TAZOBACTAM SODIUM AND PIPERACILLIN SODIUM SCH MLS/HR: 375; 3 INJECTION, SOLUTION INTRAVENOUS at 00:00

## 2019-06-29 RX ADMIN — TAZOBACTAM SODIUM AND PIPERACILLIN SODIUM SCH MLS/HR: 375; 3 INJECTION, SOLUTION INTRAVENOUS at 05:58

## 2019-07-04 ENCOUNTER — HOSPITAL ENCOUNTER (EMERGENCY)
Dept: HOSPITAL 8 - ED | Age: 64
Discharge: LEFT BEFORE DISCHARGE | End: 2019-07-04
Payer: MEDICAID

## 2019-07-04 VITALS — HEIGHT: 71 IN | WEIGHT: 149.91 LBS | BODY MASS INDEX: 20.99 KG/M2

## 2019-07-04 VITALS — SYSTOLIC BLOOD PRESSURE: 138 MMHG | DIASTOLIC BLOOD PRESSURE: 74 MMHG
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
## 2019-07-04 DIAGNOSIS — S81.802A: Primary | ICD-10-CM
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
## 2019-07-04 LAB
<PLATELET ESTIMATE>: (no result)
ALBUMIN SERPL-MCNC: 2.8 G/DL (ref 3.4–5)
ANION GAP SERPL CALC-SCNC: 8 MMOL/L (ref 5–15)
ANISOCYTOSIS BLD QL SMEAR: (no result)
BASOPHILS # BLD AUTO: 0.06 X10^3/UL (ref 0–0.1)
BASOPHILS NFR BLD AUTO: 1 % (ref 0–1)
CALCIUM SERPL-MCNC: 8.1 MG/DL (ref 8.5–10.1)
CHLORIDE SERPL-SCNC: 108 MMOL/L (ref 98–107)
CREAT SERPL-MCNC: 1.17 MG/DL (ref 0.7–1.3)
EOSINOPHIL # BLD AUTO: 0.08 X10^3/UL (ref 0–0.4)
EOSINOPHIL NFR BLD AUTO: 2 % (ref 1–7)
ERYTHROCYTE [DISTWIDTH] IN BLOOD BY AUTOMATED COUNT: 17.9 % (ref 9.4–14.8)
HYPOCHROMIA BLD QL SMEAR: (no result)
LG PLATELETS BLD QL SMEAR: (no result)
LYMPHOCYTES # BLD AUTO: 1.45 X10^3/UL (ref 1–3.4)
LYMPHOCYTES NFR BLD AUTO: 27 % (ref 22–44)
MCH RBC QN AUTO: 25.6 PG (ref 27.5–34.5)
MCHC RBC AUTO-ENTMCNC: 31.6 G/DL (ref 33.2–36.2)
MCV RBC AUTO: 80.9 FL (ref 81–97)
MD: (no result)
MICROCYTES BLD QL SMEAR: (no result)
MONOCYTES # BLD AUTO: 0.68 X10^3/UL (ref 0.2–0.8)
MONOCYTES NFR BLD AUTO: 13 % (ref 2–9)
NEUTROPHILS # BLD AUTO: 3.11 X10^3/UL (ref 1.8–6.8)
NEUTROPHILS NFR BLD AUTO: 58 % (ref 42–75)
OVALOCYTES BLD QL SMEAR: (no result)
PLATELET # BLD AUTO: 796 X10^3/UL (ref 130–400)
PMV BLD AUTO: 6.5 FL (ref 7.4–10.4)
POLYCHROMASIA BLD QL SMEAR: (no result)
RBC # BLD AUTO: 4.04 X10^6/UL (ref 4.38–5.82)

## 2019-07-04 PROCEDURE — 85025 COMPLETE CBC W/AUTO DIFF WBC: CPT

## 2019-07-04 PROCEDURE — 36415 COLL VENOUS BLD VENIPUNCTURE: CPT

## 2019-07-04 PROCEDURE — 99283 EMERGENCY DEPT VISIT LOW MDM: CPT

## 2019-07-04 PROCEDURE — 82040 ASSAY OF SERUM ALBUMIN: CPT

## 2019-07-04 PROCEDURE — 80048 BASIC METABOLIC PNL TOTAL CA: CPT

## 2019-07-08 ENCOUNTER — HOSPITAL ENCOUNTER (EMERGENCY)
Dept: HOSPITAL 8 - ED | Age: 64
LOS: 1 days | Discharge: HOME | End: 2019-07-09
Payer: MEDICAID

## 2019-07-08 VITALS — HEIGHT: 71 IN | BODY MASS INDEX: 20.99 KG/M2 | WEIGHT: 149.91 LBS
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
## 2019-07-08 DIAGNOSIS — F10.220: ICD-10-CM

## 2019-07-08 DIAGNOSIS — Z72.9: ICD-10-CM

## 2019-07-08 DIAGNOSIS — S81.802D: Primary | ICD-10-CM
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
## 2019-07-08 DIAGNOSIS — Z91.14: ICD-10-CM

## 2019-07-08 PROCEDURE — 99283 EMERGENCY DEPT VISIT LOW MDM: CPT

## 2019-07-09 VITALS — DIASTOLIC BLOOD PRESSURE: 71 MMHG | SYSTOLIC BLOOD PRESSURE: 115 MMHG

## 2019-07-10 ENCOUNTER — HOSPITAL ENCOUNTER (INPATIENT)
Dept: HOSPITAL 8 - ED | Age: 64
LOS: 2 days | Discharge: LEFT BEFORE DISCHARGE | DRG: 603 | End: 2019-07-12
Attending: HOSPITALIST | Admitting: HOSPITALIST
Payer: MEDICAID

## 2019-07-10 VITALS — DIASTOLIC BLOOD PRESSURE: 91 MMHG | SYSTOLIC BLOOD PRESSURE: 187 MMHG

## 2019-07-10 VITALS — SYSTOLIC BLOOD PRESSURE: 172 MMHG | DIASTOLIC BLOOD PRESSURE: 77 MMHG

## 2019-07-10 VITALS — BODY MASS INDEX: 21.48 KG/M2 | HEIGHT: 71 IN | WEIGHT: 153.44 LBS

## 2019-07-10 DIAGNOSIS — Z53.21: ICD-10-CM

## 2019-07-10 DIAGNOSIS — D50.9: ICD-10-CM

## 2019-07-10 DIAGNOSIS — E46: ICD-10-CM

## 2019-07-10 DIAGNOSIS — E87.6: ICD-10-CM

## 2019-07-10 DIAGNOSIS — L03.116: Primary | ICD-10-CM

## 2019-07-10 DIAGNOSIS — F17.200: ICD-10-CM

## 2019-07-10 DIAGNOSIS — J44.9: ICD-10-CM

## 2019-07-10 DIAGNOSIS — E87.2: ICD-10-CM

## 2019-07-10 DIAGNOSIS — Z59.0: ICD-10-CM

## 2019-07-10 DIAGNOSIS — Z91.19: ICD-10-CM

## 2019-07-10 DIAGNOSIS — E11.9: ICD-10-CM

## 2019-07-10 DIAGNOSIS — K21.9: ICD-10-CM

## 2019-07-10 DIAGNOSIS — I10: ICD-10-CM

## 2019-07-10 LAB
ALBUMIN SERPL-MCNC: 2.9 G/DL (ref 3.4–5)
ANION GAP SERPL CALC-SCNC: 12 MMOL/L (ref 5–15)
BASOPHILS # BLD AUTO: 0.07 X10^3/UL (ref 0–0.1)
BASOPHILS NFR BLD AUTO: 1 % (ref 0–1)
CALCIUM SERPL-MCNC: 8.6 MG/DL (ref 8.5–10.1)
CHLORIDE SERPL-SCNC: 107 MMOL/L (ref 98–107)
CREAT SERPL-MCNC: 0.88 MG/DL (ref 0.7–1.3)
EOSINOPHIL # BLD AUTO: 0.02 X10^3/UL (ref 0–0.4)
EOSINOPHIL NFR BLD AUTO: 0 % (ref 1–7)
ERYTHROCYTE [DISTWIDTH] IN BLOOD BY AUTOMATED COUNT: 17.7 % (ref 9.4–14.8)
LYMPHOCYTES # BLD AUTO: 1.32 X10^3/UL (ref 1–3.4)
LYMPHOCYTES NFR BLD AUTO: 18 % (ref 22–44)
MCH RBC QN AUTO: 24.5 PG (ref 27.5–34.5)
MCHC RBC AUTO-ENTMCNC: 30.8 G/DL (ref 33.2–36.2)
MCV RBC AUTO: 79.5 FL (ref 81–97)
MD: (no result)
MONOCYTES # BLD AUTO: 0.63 X10^3/UL (ref 0.2–0.8)
MONOCYTES NFR BLD AUTO: 9 % (ref 2–9)
NEUTROPHILS # BLD AUTO: 5.3 X10^3/UL (ref 1.8–6.8)
NEUTROPHILS NFR BLD AUTO: 72 % (ref 42–75)
PLATELET # BLD AUTO: 579 X10^3/UL (ref 130–400)
PMV BLD AUTO: 6.7 FL (ref 7.4–10.4)
RBC # BLD AUTO: 4.11 X10^6/UL (ref 4.38–5.82)

## 2019-07-10 PROCEDURE — 82306 VITAMIN D 25 HYDROXY: CPT

## 2019-07-10 PROCEDURE — 87186 SC STD MICRODIL/AGAR DIL: CPT

## 2019-07-10 PROCEDURE — 87806 HIV AG W/HIV1&2 ANTB W/OPTIC: CPT

## 2019-07-10 PROCEDURE — 96375 TX/PRO/DX INJ NEW DRUG ADDON: CPT

## 2019-07-10 PROCEDURE — 82040 ASSAY OF SERUM ALBUMIN: CPT

## 2019-07-10 PROCEDURE — 83550 IRON BINDING TEST: CPT

## 2019-07-10 PROCEDURE — 80048 BASIC METABOLIC PNL TOTAL CA: CPT

## 2019-07-10 PROCEDURE — 80307 DRUG TEST PRSMV CHEM ANLYZR: CPT

## 2019-07-10 PROCEDURE — 83735 ASSAY OF MAGNESIUM: CPT

## 2019-07-10 PROCEDURE — G0475 HIV COMBINATION ASSAY: HCPCS

## 2019-07-10 PROCEDURE — 87077 CULTURE AEROBIC IDENTIFY: CPT

## 2019-07-10 PROCEDURE — 36415 COLL VENOUS BLD VENIPUNCTURE: CPT

## 2019-07-10 PROCEDURE — 85025 COMPLETE CBC W/AUTO DIFF WBC: CPT

## 2019-07-10 PROCEDURE — 84134 ASSAY OF PREALBUMIN: CPT

## 2019-07-10 PROCEDURE — 87070 CULTURE OTHR SPECIMN AEROBIC: CPT

## 2019-07-10 PROCEDURE — 87040 BLOOD CULTURE FOR BACTERIA: CPT

## 2019-07-10 PROCEDURE — 82728 ASSAY OF FERRITIN: CPT

## 2019-07-10 PROCEDURE — 83605 ASSAY OF LACTIC ACID: CPT

## 2019-07-10 PROCEDURE — 96365 THER/PROPH/DIAG IV INF INIT: CPT

## 2019-07-10 PROCEDURE — 83540 ASSAY OF IRON: CPT

## 2019-07-10 PROCEDURE — 87205 SMEAR GRAM STAIN: CPT

## 2019-07-10 PROCEDURE — 80053 COMPREHEN METABOLIC PANEL: CPT

## 2019-07-10 RX ADMIN — SODIUM CHLORIDE AND POTASSIUM CHLORIDE SCH MLS/HR: .9; .15 SOLUTION INTRAVENOUS at 23:56

## 2019-07-10 RX ADMIN — ENOXAPARIN SODIUM SCH MG: 40 INJECTION SUBCUTANEOUS at 16:00

## 2019-07-10 RX ADMIN — VANCOMYCIN HYDROCHLORIDE SCH MLS/HR: 1 INJECTION, POWDER, LYOPHILIZED, FOR SOLUTION INTRAVENOUS at 16:37

## 2019-07-10 SDOH — ECONOMIC STABILITY - HOUSING INSECURITY: HOMELESSNESS: Z59.0

## 2019-07-11 VITALS — DIASTOLIC BLOOD PRESSURE: 81 MMHG | SYSTOLIC BLOOD PRESSURE: 151 MMHG

## 2019-07-11 VITALS — DIASTOLIC BLOOD PRESSURE: 63 MMHG | SYSTOLIC BLOOD PRESSURE: 159 MMHG

## 2019-07-11 VITALS — SYSTOLIC BLOOD PRESSURE: 163 MMHG | DIASTOLIC BLOOD PRESSURE: 97 MMHG

## 2019-07-11 VITALS — SYSTOLIC BLOOD PRESSURE: 171 MMHG | DIASTOLIC BLOOD PRESSURE: 97 MMHG

## 2019-07-11 LAB
% IRON SATURATION: 12 % (ref 20–55)
ALBUMIN SERPL-MCNC: 2.7 G/DL (ref 3.4–5)
ALP SERPL-CCNC: 127 U/L (ref 45–117)
ALT SERPL-CCNC: 18 U/L (ref 12–78)
ANION GAP SERPL CALC-SCNC: 9 MMOL/L (ref 5–15)
BASOPHILS # BLD AUTO: 0.08 X10^3/UL (ref 0–0.1)
BASOPHILS NFR BLD AUTO: 1 % (ref 0–1)
BILIRUB SERPL-MCNC: 0.5 MG/DL (ref 0.2–1)
CALCIUM SERPL-MCNC: 8.4 MG/DL (ref 8.5–10.1)
CHLORIDE SERPL-SCNC: 105 MMOL/L (ref 98–107)
CREAT SERPL-MCNC: 0.74 MG/DL (ref 0.7–1.3)
EOSINOPHIL # BLD AUTO: 0.03 X10^3/UL (ref 0–0.4)
EOSINOPHIL NFR BLD AUTO: 0 % (ref 1–7)
ERYTHROCYTE [DISTWIDTH] IN BLOOD BY AUTOMATED COUNT: 17.8 % (ref 9.4–14.8)
IRON LEVEL: 62 MCG/DL (ref 65–175)
LYMPHOCYTES # BLD AUTO: 1.21 X10^3/UL (ref 1–3.4)
LYMPHOCYTES NFR BLD AUTO: 16 % (ref 22–44)
MCH RBC QN AUTO: 24.6 PG (ref 27.5–34.5)
MCHC RBC AUTO-ENTMCNC: 31.4 G/DL (ref 33.2–36.2)
MCV RBC AUTO: 78.5 FL (ref 81–97)
MD: NO
MONOCYTES # BLD AUTO: 1.07 X10^3/UL (ref 0.2–0.8)
MONOCYTES NFR BLD AUTO: 14 % (ref 2–9)
NEUTROPHILS # BLD AUTO: 5.34 X10^3/UL (ref 1.8–6.8)
NEUTROPHILS NFR BLD AUTO: 69 % (ref 42–75)
PLATELET # BLD AUTO: 562 X10^3/UL (ref 130–400)
PMV BLD AUTO: 6.8 FL (ref 7.4–10.4)
PREALB SERPL-MCNC: 12.5 MG/DL (ref 20–40)
PROT SERPL-MCNC: 8.1 G/DL (ref 6.4–8.2)
RBC # BLD AUTO: 4.22 X10^6/UL (ref 4.38–5.82)
TIBC SERPL-MCNC: 502 MCG/DL (ref 250–450)

## 2019-07-11 RX ADMIN — VANCOMYCIN HYDROCHLORIDE SCH MLS/HR: 1 INJECTION, POWDER, LYOPHILIZED, FOR SOLUTION INTRAVENOUS at 11:12

## 2019-07-11 RX ADMIN — SODIUM CHLORIDE AND POTASSIUM CHLORIDE SCH MLS/HR: .9; .15 SOLUTION INTRAVENOUS at 10:21

## 2019-07-11 RX ADMIN — ENOXAPARIN SODIUM SCH MG: 40 INJECTION SUBCUTANEOUS at 16:00

## 2019-07-12 ENCOUNTER — HOSPITAL ENCOUNTER (EMERGENCY)
Dept: HOSPITAL 8 - ED | Age: 64
Discharge: HOME | End: 2019-07-12
Payer: MEDICAID

## 2019-07-12 VITALS — DIASTOLIC BLOOD PRESSURE: 80 MMHG | SYSTOLIC BLOOD PRESSURE: 148 MMHG

## 2019-07-12 VITALS — SYSTOLIC BLOOD PRESSURE: 134 MMHG | DIASTOLIC BLOOD PRESSURE: 71 MMHG

## 2019-07-12 VITALS — BODY MASS INDEX: 24.8 KG/M2 | WEIGHT: 154.32 LBS | HEIGHT: 66 IN

## 2019-07-12 DIAGNOSIS — I10: ICD-10-CM

## 2019-07-12 DIAGNOSIS — T81.89XA: Primary | ICD-10-CM

## 2019-07-12 DIAGNOSIS — F10.20: ICD-10-CM

## 2019-07-12 DIAGNOSIS — E11.9: ICD-10-CM

## 2019-07-12 DIAGNOSIS — J44.9: ICD-10-CM

## 2019-07-12 DIAGNOSIS — Z72.9: ICD-10-CM

## 2019-07-12 LAB
ALBUMIN SERPL-MCNC: 3.2 G/DL (ref 3.4–5)
ANION GAP SERPL CALC-SCNC: 7 MMOL/L (ref 5–15)
ANION GAP SERPL CALC-SCNC: 9 MMOL/L (ref 5–15)
BASOPHILS # BLD AUTO: 0.03 X10^3/UL (ref 0–0.1)
BASOPHILS # BLD AUTO: 0.06 X10^3/UL (ref 0–0.1)
BASOPHILS NFR BLD AUTO: 1 % (ref 0–1)
BASOPHILS NFR BLD AUTO: 1 % (ref 0–1)
CALCIUM SERPL-MCNC: 8.6 MG/DL (ref 8.5–10.1)
CALCIUM SERPL-MCNC: 9.2 MG/DL (ref 8.5–10.1)
CHLORIDE SERPL-SCNC: 102 MMOL/L (ref 98–107)
CHLORIDE SERPL-SCNC: 105 MMOL/L (ref 98–107)
CREAT SERPL-MCNC: 0.71 MG/DL (ref 0.7–1.3)
CREAT SERPL-MCNC: 0.9 MG/DL (ref 0.7–1.3)
EOSINOPHIL # BLD AUTO: 0.05 X10^3/UL (ref 0–0.4)
EOSINOPHIL # BLD AUTO: 0.14 X10^3/UL (ref 0–0.4)
EOSINOPHIL NFR BLD AUTO: 1 % (ref 1–7)
EOSINOPHIL NFR BLD AUTO: 2 % (ref 1–7)
ERYTHROCYTE [DISTWIDTH] IN BLOOD BY AUTOMATED COUNT: 17.5 % (ref 9.4–14.8)
ERYTHROCYTE [DISTWIDTH] IN BLOOD BY AUTOMATED COUNT: 17.8 % (ref 9.4–14.8)
LYMPHOCYTES # BLD AUTO: 1.01 X10^3/UL (ref 1–3.4)
LYMPHOCYTES # BLD AUTO: 1.22 X10^3/UL (ref 1–3.4)
LYMPHOCYTES NFR BLD AUTO: 17 % (ref 22–44)
LYMPHOCYTES NFR BLD AUTO: 21 % (ref 22–44)
MCH RBC QN AUTO: 24.3 PG (ref 27.5–34.5)
MCH RBC QN AUTO: 24.9 PG (ref 27.5–34.5)
MCHC RBC AUTO-ENTMCNC: 30.9 G/DL (ref 33.2–36.2)
MCHC RBC AUTO-ENTMCNC: 31.3 G/DL (ref 33.2–36.2)
MCV RBC AUTO: 78.8 FL (ref 81–97)
MCV RBC AUTO: 79.7 FL (ref 81–97)
MD: (no result)
MD: NO
MONOCYTES # BLD AUTO: 0.79 X10^3/UL (ref 0.2–0.8)
MONOCYTES # BLD AUTO: 0.95 X10^3/UL (ref 0.2–0.8)
MONOCYTES NFR BLD AUTO: 13 % (ref 2–9)
MONOCYTES NFR BLD AUTO: 16 % (ref 2–9)
NEUTROPHILS # BLD AUTO: 3.6 X10^3/UL (ref 1.8–6.8)
NEUTROPHILS # BLD AUTO: 4.16 X10^3/UL (ref 1.8–6.8)
NEUTROPHILS NFR BLD AUTO: 61 % (ref 42–75)
NEUTROPHILS NFR BLD AUTO: 69 % (ref 42–75)
PLATELET # BLD AUTO: 502 X10^3/UL (ref 130–400)
PLATELET # BLD AUTO: 509 X10^3/UL (ref 130–400)
PMV BLD AUTO: 6.6 FL (ref 7.4–10.4)
PMV BLD AUTO: 7.4 FL (ref 7.4–10.4)
RBC # BLD AUTO: 3.83 X10^6/UL (ref 4.38–5.82)
RBC # BLD AUTO: 3.9 X10^6/UL (ref 4.38–5.82)

## 2019-07-12 PROCEDURE — 99283 EMERGENCY DEPT VISIT LOW MDM: CPT

## 2019-07-12 PROCEDURE — 82040 ASSAY OF SERUM ALBUMIN: CPT

## 2019-07-12 PROCEDURE — 85025 COMPLETE CBC W/AUTO DIFF WBC: CPT

## 2019-07-12 PROCEDURE — 36415 COLL VENOUS BLD VENIPUNCTURE: CPT

## 2019-07-12 PROCEDURE — 80048 BASIC METABOLIC PNL TOTAL CA: CPT

## 2019-07-12 RX ADMIN — VANCOMYCIN HYDROCHLORIDE SCH MLS/HR: 1 INJECTION, POWDER, LYOPHILIZED, FOR SOLUTION INTRAVENOUS at 04:47

## 2019-12-21 ENCOUNTER — HOSPITAL ENCOUNTER (EMERGENCY)
Dept: HOSPITAL 8 - ED | Age: 64
Discharge: HOME | End: 2019-12-21
Payer: MEDICARE

## 2019-12-21 VITALS — DIASTOLIC BLOOD PRESSURE: 59 MMHG | SYSTOLIC BLOOD PRESSURE: 95 MMHG

## 2019-12-21 VITALS — BODY MASS INDEX: 29.14 KG/M2 | WEIGHT: 154.32 LBS | HEIGHT: 61 IN

## 2019-12-21 DIAGNOSIS — I10: ICD-10-CM

## 2019-12-21 DIAGNOSIS — F10.220: Primary | ICD-10-CM
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
## 2019-12-21 DIAGNOSIS — J44.9: ICD-10-CM

## 2019-12-21 DIAGNOSIS — E11.9: ICD-10-CM

## 2019-12-21 PROCEDURE — 99283 EMERGENCY DEPT VISIT LOW MDM: CPT

## 2019-12-25 ENCOUNTER — HOSPITAL ENCOUNTER (EMERGENCY)
Dept: HOSPITAL 8 - ED | Age: 64
Discharge: HOME | End: 2019-12-25
Payer: MEDICARE

## 2019-12-25 VITALS — BODY MASS INDEX: 22.09 KG/M2 | HEIGHT: 70 IN | WEIGHT: 154.32 LBS

## 2019-12-25 VITALS — SYSTOLIC BLOOD PRESSURE: 100 MMHG | DIASTOLIC BLOOD PRESSURE: 67 MMHG
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
## 2019-12-25 DIAGNOSIS — I10: ICD-10-CM

## 2019-12-25 DIAGNOSIS — F10.120: Primary | ICD-10-CM

## 2019-12-25 DIAGNOSIS — E11.9: ICD-10-CM

## 2019-12-25 DIAGNOSIS — F17.200: ICD-10-CM

## 2019-12-25 DIAGNOSIS — J44.9: ICD-10-CM

## 2019-12-25 DIAGNOSIS — Z72.9: ICD-10-CM

## 2019-12-25 DIAGNOSIS — K21.9: ICD-10-CM

## 2019-12-25 PROCEDURE — 99283 EMERGENCY DEPT VISIT LOW MDM: CPT

## 2020-01-18 ENCOUNTER — HOSPITAL ENCOUNTER (EMERGENCY)
Facility: MEDICAL CENTER | Age: 65
End: 2020-01-19
Attending: EMERGENCY MEDICINE
Payer: MEDICARE

## 2020-01-18 DIAGNOSIS — F10.920 ALCOHOLIC INTOXICATION WITHOUT COMPLICATION (HCC): ICD-10-CM

## 2020-01-18 DIAGNOSIS — L03.116 LEFT LEG CELLULITIS: ICD-10-CM

## 2020-01-18 LAB — POC BREATHALIZER: 0.21 PERCENT (ref 0–0.01)

## 2020-01-18 PROCEDURE — 99284 EMERGENCY DEPT VISIT MOD MDM: CPT

## 2020-01-18 PROCEDURE — 303485 HCHG DRESSING MEDIUM

## 2020-01-18 PROCEDURE — 302970 POC BREATHALIZER: Performed by: EMERGENCY MEDICINE

## 2020-01-18 ASSESSMENT — LIFESTYLE VARIABLES
TOTAL SCORE: 0
DO YOU DRINK ALCOHOL: YES
HAVE YOU EVER FELT YOU SHOULD CUT DOWN ON YOUR DRINKING: NO
TOTAL SCORE: 0
EVER HAD A DRINK FIRST THING IN THE MORNING TO STEADY YOUR NERVES TO GET RID OF A HANGOVER: NO
TOTAL SCORE: 0
EVER FELT BAD OR GUILTY ABOUT YOUR DRINKING: NO
HAVE PEOPLE ANNOYED YOU BY CRITICIZING YOUR DRINKING: NO
DOES PATIENT WANT TO STOP DRINKING: NO
CONSUMPTION TOTAL: INCOMPLETE

## 2020-01-19 VITALS
HEART RATE: 75 BPM | RESPIRATION RATE: 16 BRPM | BODY MASS INDEX: 20.3 KG/M2 | OXYGEN SATURATION: 95 % | DIASTOLIC BLOOD PRESSURE: 75 MMHG | WEIGHT: 145 LBS | HEIGHT: 71 IN | SYSTOLIC BLOOD PRESSURE: 109 MMHG | TEMPERATURE: 96.5 F

## 2020-01-19 RX ORDER — DOXYCYCLINE 100 MG/1
100 CAPSULE ORAL 2 TIMES DAILY
Qty: 20 CAP | Refills: 0 | Status: SHIPPED | OUTPATIENT
Start: 2020-01-19 | End: 2020-01-29

## 2020-01-19 NOTE — ED TRIAGE NOTES
"Chief Complaint   Patient presents with   • Alcohol Intoxication     Found near [facility], unable to walk, states he drank \"a lot\"     "

## 2020-01-19 NOTE — ED NOTES
Patient undressed and changed into gown, large left lower leg wound noted, wound bed formed but appears non-healing with clear drainage noted, patient states he does not remember wound origin, not actively bleeding, will continue to assess patient, Dr. Beltran notified of wound at this time.

## 2020-01-19 NOTE — ED PROVIDER NOTES
"ED Provider Note    Scribed for Brett Beltran M.D. by Natasha Reeves. 1/18/2020, 9:21 PM.    Primary care provider: No primary care provider on file.  Means of arrival: EMS  History obtained from: Patient  History limited by: None    CHIEF COMPLAINT  Chief Complaint   Patient presents with   • Alcohol Intoxication     Found near [facility], unable to walk, states he drank \"a lot\"       HPI  Bola Varela is a 64 y.o. homeless male with a history of alcohol abuse brought in by EMS who presents to the Emergency Department. The patient states he \"drank too much\".     REVIEW OF SYSTEMS  See HPI for further details. All other systems are negative.     PAST MEDICAL HISTORY   has a past medical history of Psychiatric problem, Restless leg, and Tobacco use.    SURGICAL HISTORY   has a past surgical history that includes ankle orif (Right).    SOCIAL HISTORY  Social History     Tobacco Use   • Smoking status: Light Tobacco Smoker     Years: 40.00     Types: Cigarettes   • Smokeless tobacco: Never Used   • Tobacco comment: 1 ppd until few years ago   Substance Use Topics   • Alcohol use: Yes     Comment: occ   • Drug use: No     Types: Marijuana, Inhaled      Social History     Substance and Sexual Activity   Drug Use No   • Types: Marijuana, Inhaled       FAMILY HISTORY  Family History   Problem Relation Age of Onset   • Heart Disease Mother    • No Known Problems Father    • Cancer Neg Hx    • Diabetes Neg Hx    • Stroke Neg Hx        CURRENT MEDICATIONS  Reviewed.  See Encounter Summary.     ALLERGIES  No Known Allergies    PHYSICAL EXAM  VITAL SIGNS: /71   Pulse 79   Temp 35.8 °C (96.5 °F) (Temporal)   Resp 18   Ht 1.803 m (5' 11\")   Wt 65.8 kg (145 lb)   SpO2 92%   BMI 20.22 kg/m²   Constitutional: Alert in no apparent distress.  HENT: No signs of trauma, Bilateral external ears normal, Nose normal.   Eyes: Pupils are equal and reactive, Conjunctiva normal, Non-icteric.   Neck: Normal range of " motion, No tenderness, Supple, No stridor.   Cardiovascular: Regular rate and rhythm, no murmurs.   Thorax & Lungs: Normal breath sounds, No respiratory distress, No wheezing, No chest tenderness.   Abdomen: Bowel sounds normal, Soft, No tenderness, No masses, No pulsatile masses. No peritoneal signs.  Skin: Warm, Dry, No erythema, No rash.   Back: No bony tenderness, No CVA tenderness.   Extremities: Intact distal pulses, No edema, No tenderness, No cyanosis. Left lower extremity at knee chronic appearing wound with contracture. Large granulated central healing with rolled rims with erythema. No acute exudative discharge. No warmth.   Musculoskeletal: Good range of motion in all major joints. No tenderness to palpation or major deformities noted.   Neurologic: Alert , Normal motor function, Normal sensory function, No focal deficits noted.   Psychiatric: Affect normal, Judgment normal, Mood normal.     DIAGNOSTIC STUDIES / PROCEDURES     LABS  Results for orders placed or performed during the hospital encounter of 01/18/20   POC BREATHALIZER   Result Value Ref Range    POC Breathalizer 0.21 (A) 0.00 - 0.01 Percent     All labs were reviewed by me.      COURSE & MEDICAL DECISION MAKING  Pertinent Labs & Imaging studies reviewed. (See chart for details)    9:21 PM - Patient seen and examined at bedside. Ordered POC Breathalyzer to evaluate his symptoms.     10:59 PM - Patient was reevaluated at bedside. He has improved and was able to ambulate without assistance. Discussed lab and radiology results with the patient and informed them that he will be discharged with antibiotics for the wound on his leg. The patient understood and was agreeable with the plan. They will follow up with their primary care physician or return to the ED with any changes.         Decision Making:  This is a 64 y.o. year old male who presents with recurrent alcohol intoxication.  Patient is later in his observation ambulatory.  Exam did  uncover a very large wound in the left lower extremity which appears to be chronic on chart review.  Overall from previous descriptions appears the same.  I will again cover him with antibiotics that it does appear that there is some persistent erythematous changes which may be a chronic mild cellulitis.  Patient is currently undomiciled likely making this difficult.  He is understanding return precautions and need to decrease overall alcohol intake.    DISPOSITION:  Patient will be discharged home in stable condition.    FINAL IMPRESSION  1. Alcoholic intoxication without complication (HCC)    2. Left leg cellulitis          Natasha ODOM (Scribe), am scribing for, and in the presence of, Brett Beltran M.D..    Electronically signed by: Natasha Reeves (Scribe), 1/18/2020    IBrett M.D. personally performed the services described in this documentation, as scribed by Natasha Reeves in my presence, and it is both accurate and complete.    E    The note accurately reflects work and decisions made by me.  Brett Beltran M.D.  1/19/2020  3:52 AM

## 2020-01-19 NOTE — ED NOTES
Patient resting in bed, sleeping, no signs of pain or distress, unlabored breathing noted, frequent rounding performed, bed in low position, call light within reach, will continue to assess patient.

## 2020-01-19 NOTE — ED NOTES
Dr. Beltran speaking with patient at this time.    Patient awake alert and oriented x 4, [place] 15, bed in low position, call light within reach, on room air, unlabored breathing noted, no cough noted, interacts with staff, interactions noted as appropriate.

## 2020-01-19 NOTE — ED NOTES
Dry dressing applied to left leg wound per Dr. Beltran, gait steady, ok for discharge per Dr. Beltran.    Patient verbalized understanding of discharge instructions, provided with discharge paperwork, gait steady, ambulated independently to PRANAV schaffer.

## 2020-01-19 NOTE — ED NOTES
Patient resting in bed, sleeping, no signs of pain or distress, unlabored breathing noted, in hallway bed, bed in low position, call light within reach, repositions self, frequent rounding performed, will continue to assess patient.

## 2020-01-31 ENCOUNTER — HOSPITAL ENCOUNTER (INPATIENT)
Facility: MEDICAL CENTER | Age: 65
LOS: 5 days | DRG: 605 | End: 2020-02-05
Attending: EMERGENCY MEDICINE | Admitting: INTERNAL MEDICINE
Payer: MEDICARE

## 2020-01-31 ENCOUNTER — APPOINTMENT (OUTPATIENT)
Dept: RADIOLOGY | Facility: MEDICAL CENTER | Age: 65
DRG: 605 | End: 2020-01-31
Attending: INTERNAL MEDICINE
Payer: MEDICARE

## 2020-01-31 DIAGNOSIS — S81.802A WOUND OF LEFT LOWER EXTREMITY, INITIAL ENCOUNTER: ICD-10-CM

## 2020-01-31 DIAGNOSIS — M24.562 FLEXION CONTRACTURE OF KNEE, LEFT: ICD-10-CM

## 2020-01-31 DIAGNOSIS — S81.802D OPEN WOUND OF LEFT LOWER LEG, SUBSEQUENT ENCOUNTER: ICD-10-CM

## 2020-01-31 PROBLEM — R79.89 LFT ELEVATION: Status: ACTIVE | Noted: 2020-01-31

## 2020-01-31 LAB
ALBUMIN SERPL BCP-MCNC: 3.9 G/DL (ref 3.2–4.9)
ALBUMIN/GLOB SERPL: 0.8 G/DL
ALP SERPL-CCNC: 105 U/L (ref 30–99)
ALT SERPL-CCNC: 48 U/L (ref 2–50)
ANION GAP SERPL CALC-SCNC: 15 MMOL/L (ref 0–11.9)
AST SERPL-CCNC: 53 U/L (ref 12–45)
BASOPHILS # BLD AUTO: 0.9 % (ref 0–1.8)
BASOPHILS # BLD: 0.05 K/UL (ref 0–0.12)
BILIRUB SERPL-MCNC: 0.3 MG/DL (ref 0.1–1.5)
BUN SERPL-MCNC: 10 MG/DL (ref 8–22)
CALCIUM SERPL-MCNC: 9.1 MG/DL (ref 8.5–10.5)
CHLORIDE SERPL-SCNC: 106 MMOL/L (ref 96–112)
CO2 SERPL-SCNC: 18 MMOL/L (ref 20–33)
CREAT SERPL-MCNC: 0.69 MG/DL (ref 0.5–1.4)
EOSINOPHIL # BLD AUTO: 0.02 K/UL (ref 0–0.51)
EOSINOPHIL NFR BLD: 0.4 % (ref 0–6.9)
ERYTHROCYTE [DISTWIDTH] IN BLOOD BY AUTOMATED COUNT: 51.8 FL (ref 35.9–50)
GLOBULIN SER CALC-MCNC: 4.7 G/DL (ref 1.9–3.5)
GLUCOSE SERPL-MCNC: 91 MG/DL (ref 65–99)
HCT VFR BLD AUTO: 37.5 % (ref 42–52)
HGB BLD-MCNC: 11.7 G/DL (ref 14–18)
IMM GRANULOCYTES # BLD AUTO: 0.01 K/UL (ref 0–0.11)
IMM GRANULOCYTES NFR BLD AUTO: 0.2 % (ref 0–0.9)
LACTATE BLD-SCNC: 2.1 MMOL/L (ref 0.5–2)
LYMPHOCYTES # BLD AUTO: 2.28 K/UL (ref 1–4.8)
LYMPHOCYTES NFR BLD: 40.2 % (ref 22–41)
MCH RBC QN AUTO: 27.1 PG (ref 27–33)
MCHC RBC AUTO-ENTMCNC: 31.2 G/DL (ref 33.7–35.3)
MCV RBC AUTO: 86.8 FL (ref 81.4–97.8)
MONOCYTES # BLD AUTO: 0.86 K/UL (ref 0–0.85)
MONOCYTES NFR BLD AUTO: 15.2 % (ref 0–13.4)
NEUTROPHILS # BLD AUTO: 2.45 K/UL (ref 1.82–7.42)
NEUTROPHILS NFR BLD: 43.1 % (ref 44–72)
NRBC # BLD AUTO: 0 K/UL
NRBC BLD-RTO: 0 /100 WBC
PLATELET # BLD AUTO: 535 K/UL (ref 164–446)
PMV BLD AUTO: 8.7 FL (ref 9–12.9)
POTASSIUM SERPL-SCNC: 4.1 MMOL/L (ref 3.6–5.5)
PROT SERPL-MCNC: 8.6 G/DL (ref 6–8.2)
RBC # BLD AUTO: 4.32 M/UL (ref 4.7–6.1)
SODIUM SERPL-SCNC: 139 MMOL/L (ref 135–145)
WBC # BLD AUTO: 5.7 K/UL (ref 4.8–10.8)

## 2020-01-31 PROCEDURE — 99223 1ST HOSP IP/OBS HIGH 75: CPT | Mod: 25 | Performed by: INTERNAL MEDICINE

## 2020-01-31 PROCEDURE — 99406 BEHAV CHNG SMOKING 3-10 MIN: CPT | Performed by: INTERNAL MEDICINE

## 2020-01-31 PROCEDURE — 80053 COMPREHEN METABOLIC PANEL: CPT

## 2020-01-31 PROCEDURE — 700102 HCHG RX REV CODE 250 W/ 637 OVERRIDE(OP): Performed by: INTERNAL MEDICINE

## 2020-01-31 PROCEDURE — 700105 HCHG RX REV CODE 258: Performed by: INTERNAL MEDICINE

## 2020-01-31 PROCEDURE — 770006 HCHG ROOM/CARE - MED/SURG/GYN SEMI*

## 2020-01-31 PROCEDURE — 36415 COLL VENOUS BLD VENIPUNCTURE: CPT

## 2020-01-31 PROCEDURE — 96365 THER/PROPH/DIAG IV INF INIT: CPT

## 2020-01-31 PROCEDURE — 700111 HCHG RX REV CODE 636 W/ 250 OVERRIDE (IP): Performed by: INTERNAL MEDICINE

## 2020-01-31 PROCEDURE — 99285 EMERGENCY DEPT VISIT HI MDM: CPT

## 2020-01-31 PROCEDURE — 85025 COMPLETE CBC W/AUTO DIFF WBC: CPT

## 2020-01-31 PROCEDURE — A9270 NON-COVERED ITEM OR SERVICE: HCPCS | Performed by: INTERNAL MEDICINE

## 2020-01-31 PROCEDURE — 83605 ASSAY OF LACTIC ACID: CPT

## 2020-01-31 RX ORDER — POLYETHYLENE GLYCOL 3350 17 G/17G
1 POWDER, FOR SOLUTION ORAL
Status: DISCONTINUED | OUTPATIENT
Start: 2020-01-31 | End: 2020-02-05 | Stop reason: HOSPADM

## 2020-01-31 RX ORDER — DOXYCYCLINE 100 MG/1
100 TABLET ORAL EVERY 12 HOURS
Status: DISCONTINUED | OUTPATIENT
Start: 2020-01-31 | End: 2020-02-01

## 2020-01-31 RX ORDER — ACETAMINOPHEN 325 MG/1
650 TABLET ORAL EVERY 6 HOURS PRN
Status: DISCONTINUED | OUTPATIENT
Start: 2020-01-31 | End: 2020-02-05 | Stop reason: HOSPADM

## 2020-01-31 RX ORDER — BISACODYL 10 MG
10 SUPPOSITORY, RECTAL RECTAL
Status: DISCONTINUED | OUTPATIENT
Start: 2020-01-31 | End: 2020-02-05 | Stop reason: HOSPADM

## 2020-01-31 RX ORDER — AMOXICILLIN 250 MG
2 CAPSULE ORAL 2 TIMES DAILY
Status: DISCONTINUED | OUTPATIENT
Start: 2020-01-31 | End: 2020-02-05 | Stop reason: HOSPADM

## 2020-01-31 RX ORDER — NICOTINE 21 MG/24HR
14 PATCH, TRANSDERMAL 24 HOURS TRANSDERMAL
Status: DISCONTINUED | OUTPATIENT
Start: 2020-01-31 | End: 2020-02-05 | Stop reason: HOSPADM

## 2020-01-31 RX ADMIN — NICOTINE 14 MG: 14 PATCH TRANSDERMAL at 18:09

## 2020-01-31 RX ADMIN — AMPICILLIN SODIUM AND SULBACTAM SODIUM 3 G: 2; 1 INJECTION, POWDER, FOR SOLUTION INTRAMUSCULAR; INTRAVENOUS at 18:12

## 2020-01-31 RX ADMIN — DOXYCYCLINE 100 MG: 100 TABLET, FILM COATED ORAL at 18:09

## 2020-01-31 RX ADMIN — ACETAMINOPHEN 650 MG: 325 TABLET, FILM COATED ORAL at 20:42

## 2020-01-31 SDOH — HEALTH STABILITY: MENTAL HEALTH: HOW OFTEN DO YOU HAVE 6 OR MORE DRINKS ON ONE OCCASION?: NEVER

## 2020-01-31 SDOH — ECONOMIC STABILITY: FOOD INSECURITY: WITHIN THE PAST 12 MONTHS, YOU WORRIED THAT YOUR FOOD WOULD RUN OUT BEFORE YOU GOT MONEY TO BUY MORE.: NEVER TRUE

## 2020-01-31 SDOH — ECONOMIC STABILITY: TRANSPORTATION INSECURITY
IN THE PAST 12 MONTHS, HAS LACK OF TRANSPORTATION KEPT YOU FROM MEETINGS, WORK, OR FROM GETTING THINGS NEEDED FOR DAILY LIVING?: NO

## 2020-01-31 SDOH — HEALTH STABILITY: MENTAL HEALTH: HOW OFTEN DO YOU HAVE A DRINK CONTAINING ALCOHOL?: 2-4 TIMES A MONTH

## 2020-01-31 SDOH — HEALTH STABILITY: MENTAL HEALTH: HOW MANY STANDARD DRINKS CONTAINING ALCOHOL DO YOU HAVE ON A TYPICAL DAY?: 3 OR 4

## 2020-01-31 SDOH — ECONOMIC STABILITY: TRANSPORTATION INSECURITY
IN THE PAST 12 MONTHS, HAS THE LACK OF TRANSPORTATION KEPT YOU FROM MEDICAL APPOINTMENTS OR FROM GETTING MEDICATIONS?: NO

## 2020-01-31 SDOH — ECONOMIC STABILITY: FOOD INSECURITY: WITHIN THE PAST 12 MONTHS, THE FOOD YOU BOUGHT JUST DIDN'T LAST AND YOU DIDN'T HAVE MONEY TO GET MORE.: NEVER TRUE

## 2020-01-31 ASSESSMENT — COGNITIVE AND FUNCTIONAL STATUS - GENERAL
DAILY ACTIVITIY SCORE: 24
SUGGESTED CMS G CODE MODIFIER MOBILITY: CH
MOBILITY SCORE: 24
SUGGESTED CMS G CODE MODIFIER DAILY ACTIVITY: CH

## 2020-01-31 ASSESSMENT — ENCOUNTER SYMPTOMS
BLURRED VISION: 0
FLANK PAIN: 0
CHILLS: 0
HEMOPTYSIS: 0
FEVER: 0
PALPITATIONS: 0
NAUSEA: 0
HEARTBURN: 0
COUGH: 0
VOMITING: 0
HEADACHES: 0
SPUTUM PRODUCTION: 0
SPEECH CHANGE: 0
FOCAL WEAKNESS: 0
TREMORS: 0
BRUISES/BLEEDS EASILY: 0
HALLUCINATIONS: 0
BACK PAIN: 0
WEIGHT LOSS: 0
ORTHOPNEA: 0
POLYDIPSIA: 0
PHOTOPHOBIA: 0
NECK PAIN: 0
NERVOUS/ANXIOUS: 0
DOUBLE VISION: 0

## 2020-01-31 ASSESSMENT — LIFESTYLE VARIABLES
TOTAL SCORE: 0
HAVE PEOPLE ANNOYED YOU BY CRITICIZING YOUR DRINKING: NO
EVER_SMOKED: YES
TOTAL SCORE: 0
HOW MANY TIMES IN THE PAST YEAR HAVE YOU HAD 5 OR MORE DRINKS IN A DAY: 0
ALCOHOL_USE: YES
ON A TYPICAL DAY WHEN YOU DRINK ALCOHOL HOW MANY DRINKS DO YOU HAVE: 3
EVER HAD A DRINK FIRST THING IN THE MORNING TO STEADY YOUR NERVES TO GET RID OF A HANGOVER: NO
EVER FELT BAD OR GUILTY ABOUT YOUR DRINKING: NO
AVERAGE NUMBER OF DAYS PER WEEK YOU HAVE A DRINK CONTAINING ALCOHOL: 1
TOTAL SCORE: 0
HAVE YOU EVER FELT YOU SHOULD CUT DOWN ON YOUR DRINKING: NO
SUBSTANCE_ABUSE: 0
DOES PATIENT WANT TO STOP DRINKING: NO
CONSUMPTION TOTAL: NEGATIVE

## 2020-01-31 ASSESSMENT — COPD QUESTIONNAIRES
HAVE YOU SMOKED AT LEAST 100 CIGARETTES IN YOUR ENTIRE LIFE: YES
DO YOU EVER COUGH UP ANY MUCUS OR PHLEGM?: NO/ONLY WITH OCCASIONAL COLDS OR INFECTIONS
COPD SCREENING SCORE: 4
DURING THE PAST 4 WEEKS HOW MUCH DID YOU FEEL SHORT OF BREATH: NONE/LITTLE OF THE TIME
IN THE PAST 12 MONTHS DO YOU DO LESS THAN YOU USED TO BECAUSE OF YOUR BREATHING PROBLEMS: DISAGREE/UNSURE

## 2020-01-31 NOTE — ED NOTES
Assist RN to:  IV started per orders. Pt tolerated procedure well, resting comfortably at this time. Labs sent. Pt educated about plan of care.

## 2020-01-31 NOTE — ED TRIAGE NOTES
"65 y/o male ambulatory to triage with c/o open wound to the left lower leg. Pt states the wound has been open for \"about a year\", initial injury was from a fall. Wound draining and malodorous.   "

## 2020-01-31 NOTE — ED NOTES
Med Rec completed per patient   Allergies reviewed  No ORAL antibiotics in last 14 days    Patient states that he does not take any daily medications

## 2020-01-31 NOTE — ED PROVIDER NOTES
ED Provider Note    CHIEF COMPLAINT  Chief Complaint   Patient presents with   • Wound Infection   • Leg Pain       HPI  Bola Varela is a 64 y.o. male who presents to the emergency department complaining of left leg pain and swelling.  The patient has a history of chronic nonhealing wound of his left leg.  Is not taking care of this.  Is now has foul-smelling drainage.      The patient is a relatively poor historian.  Denies any fevers or chills.  Eyes any acute trauma.  Denies any other acute concerns or complaints.  He is quite scarred down around the knee where the wound was is having a hard time walking.  Denies any other acute concerns or complaints.  Is homeless and unable to care for this.                                                                                                                                                                                                                                                                                                                                                                                                                       REVIEW OF SYSTEMS  See HPI for further details. All other systems are negative.    PAST MEDICAL HISTORY  Past Medical History:   Diagnosis Date   • Psychiatric problem    • Restless leg    • Tobacco use        FAMILY HISTORY  Family History   Problem Relation Age of Onset   • Heart Disease Mother    • No Known Problems Father    • Cancer Neg Hx    • Diabetes Neg Hx    • Stroke Neg Hx        SOCIAL HISTORY  Social History     Socioeconomic History   • Marital status: Single     Spouse name: Not on file   • Number of children: Not on file   • Years of education: Not on file   • Highest education level: Not on file   Occupational History   • Not on file   Social Needs   • Financial resource strain: Not on file   • Food insecurity:     Worry: Not on file     Inability: Not on file   • Transportation needs:     Medical:  "Not on file     Non-medical: Not on file   Tobacco Use   • Smoking status: Light Tobacco Smoker     Years: 40.00     Types: Cigarettes   • Smokeless tobacco: Never Used   • Tobacco comment: 1 ppd until few years ago   Substance and Sexual Activity   • Alcohol use: Yes     Comment: occ   • Drug use: No     Types: Marijuana, Inhaled   • Sexual activity: Not Currently     Partners: Female   Lifestyle   • Physical activity:     Days per week: Not on file     Minutes per session: Not on file   • Stress: Not on file   Relationships   • Social connections:     Talks on phone: Not on file     Gets together: Not on file     Attends Taoist service: Not on file     Active member of club or organization: Not on file     Attends meetings of clubs or organizations: Not on file     Relationship status: Not on file   • Intimate partner violence:     Fear of current or ex partner: Not on file     Emotionally abused: Not on file     Physically abused: Not on file     Forced sexual activity: Not on file   Other Topics Concern   • Not on file   Social History Narrative   • Not on file       SURGICAL HISTORY  Past Surgical History:   Procedure Laterality Date   • ANKLE ORIF Right        CURRENT MEDICATIONS  Home Medications     Reviewed by Sarah Mary (Pharmacy Tech) on 01/31/20 at 1330  Med List Status: Complete   Medication Last Dose Status        Patient Berhane Taking any Medications                       ALLERGIES  No Known Allergies    PHYSICAL EXAM  VITAL SIGNS: /65   Pulse 88   Temp 36.7 °C (98.1 °F) (Temporal)   Resp 16   Ht 1.778 m (5' 10\")   Wt 67.9 kg (149 lb 11.1 oz)   SpO2 96%   BMI 21.48 kg/m²    Constitutional: Awake alert, chronically ill-appearing  HENT: Normocephalic, Atraumatic, Bilateral external ears normal, Oropharynx moist, No oral exudates, Nose normal.   Eyes: PERRL, EOMI, Conjunctiva normal, No discharge.   Cardiovascular: Normal heart rate, Normal rhythm, No murmurs, No rubs, No gallops. "   Thorax & Lungs: Normal breath sounds, No respiratory distress, No wheezing,  Abdomen: Bowel sounds normal, Soft, No tenderness  Back: No tenderness, No CVA tenderness.   Musculoskeletal: Good range of motion in all major joints.  Left lower extremity has a large open wound the posterior aspect on the medial aspect.  Is a large scar between the 2.  The edges are rolled some granulation tissue but some of the still has exposed muscle.  His leg is diffusely swollen but is not erythematous or hot there is no acute infection  Neurologic: Alert, No focal deficits noted.           Results for orders placed or performed during the hospital encounter of 01/31/20   LACTIC ACID   Result Value Ref Range    Lactic Acid 2.1 (H) 0.5 - 2.0 mmol/L   CBC WITH DIFFERENTIAL   Result Value Ref Range    WBC 5.7 4.8 - 10.8 K/uL    RBC 4.32 (L) 4.70 - 6.10 M/uL    Hemoglobin 11.7 (L) 14.0 - 18.0 g/dL    Hematocrit 37.5 (L) 42.0 - 52.0 %    MCV 86.8 81.4 - 97.8 fL    MCH 27.1 27.0 - 33.0 pg    MCHC 31.2 (L) 33.7 - 35.3 g/dL    RDW 51.8 (H) 35.9 - 50.0 fL    Platelet Count 535 (H) 164 - 446 K/uL    MPV 8.7 (L) 9.0 - 12.9 fL    Neutrophils-Polys 43.10 (L) 44.00 - 72.00 %    Lymphocytes 40.20 22.00 - 41.00 %    Monocytes 15.20 (H) 0.00 - 13.40 %    Eosinophils 0.40 0.00 - 6.90 %    Basophils 0.90 0.00 - 1.80 %    Immature Granulocytes 0.20 0.00 - 0.90 %    Nucleated RBC 0.00 /100 WBC    Neutrophils (Absolute) 2.45 1.82 - 7.42 K/uL    Lymphs (Absolute) 2.28 1.00 - 4.80 K/uL    Monos (Absolute) 0.86 (H) 0.00 - 0.85 K/uL    Eos (Absolute) 0.02 0.00 - 0.51 K/uL    Baso (Absolute) 0.05 0.00 - 0.12 K/uL    Immature Granulocytes (abs) 0.01 0.00 - 0.11 K/uL    NRBC (Absolute) 0.00 K/uL   Comp Metabolic Panel   Result Value Ref Range    Sodium 139 135 - 145 mmol/L    Potassium 4.1 3.6 - 5.5 mmol/L    Chloride 106 96 - 112 mmol/L    Co2 18 (L) 20 - 33 mmol/L    Anion Gap 15.0 (H) 0.0 - 11.9    Glucose 91 65 - 99 mg/dL    Bun 10 8 - 22 mg/dL     Creatinine 0.69 0.50 - 1.40 mg/dL    Calcium 9.1 8.5 - 10.5 mg/dL    AST(SGOT) 53 (H) 12 - 45 U/L    ALT(SGPT) 48 2 - 50 U/L    Alkaline Phosphatase 105 (H) 30 - 99 U/L    Total Bilirubin 0.3 0.1 - 1.5 mg/dL    Albumin 3.9 3.2 - 4.9 g/dL    Total Protein 8.6 (H) 6.0 - 8.2 g/dL    Globulin 4.7 (H) 1.9 - 3.5 g/dL    A-G Ratio 0.8 g/dL   ESTIMATED GFR   Result Value Ref Range    GFR If African American >60 >60 mL/min/1.73 m 2    GFR If Non African American >60 >60 mL/min/1.73 m 2      COURSE & MEDICAL DECISION MAKING  Pertinent Labs & Imaging studies reviewed. (See chart for details)  Patient presents with a large deep chronic open wound.    Labs are obtained these are reviewed.  The patient needs wound care to have any help with this having a positive outcome.  Even though it is chronic longstanding consulted wound care.  They seen in the past but is noncompliant and has not followed up.  They feel his wounds are too significant for outpatient management.  For that reason they recommend admission and even plastic surgery consultation.  I have spoke with the hospitalist and he will see the patient for hospitalization    Spoke with Dr. Finley from plastic surgery and she will see the patient for consultation.    Patient requires hospitalization for continued work-up and treatment.      FINAL IMPRESSION  1.  Chronic nonhealing wound of left leg  2.  Alcohol abuse  3 homelessness.         Electronically signed by: Brett Richter M.D., 1/31/2020 1:34 PM

## 2020-01-31 NOTE — WOUND TEAM
VM received regarding wound care for patient. Patient seen in [facility]. Pt appears to have a full thickness wound to left lower extremity. Per chart review, inpatient Wound Team has seen patient on previous admissions. Pt has hx of necrotizing fasciitis and had surgery at [facility] back in 2018. Pt is a poor historian and unable to recall when he was at his last outpatient wound care appt. Cleansed wound w/ NS and gauze. Wound appears to be dry. Foul odor noted. Pt's wound appears to have epibole edges which will make wound healing difficult. Petrolatum gauze applied to wound bed (to provide moisture to wound bed), secured w/ abd pad and roll gauze and tape. Discussed w/ Dr Neelam ROCK MD for a possible surgery consult. Wound team will continue to follow patient if he gets admitted. Extra supplies given to patient. Educated pt on how to do dressing changes.    Updated Marci ROCK RN that pt appears to be more anxious and had increased tremors while doing dressing care changes. Pt has hx of ETOH abuse and was unable to recall his last drink.

## 2020-02-01 ENCOUNTER — APPOINTMENT (OUTPATIENT)
Dept: RADIOLOGY | Facility: MEDICAL CENTER | Age: 65
DRG: 605 | End: 2020-02-01
Attending: HOSPITALIST
Payer: MEDICARE

## 2020-02-01 ENCOUNTER — APPOINTMENT (OUTPATIENT)
Dept: RADIOLOGY | Facility: MEDICAL CENTER | Age: 65
DRG: 605 | End: 2020-02-01
Attending: INTERNAL MEDICINE
Payer: MEDICARE

## 2020-02-01 LAB
ANION GAP SERPL CALC-SCNC: 12 MMOL/L (ref 0–11.9)
BASOPHILS # BLD AUTO: 0.8 % (ref 0–1.8)
BASOPHILS # BLD: 0.05 K/UL (ref 0–0.12)
BUN SERPL-MCNC: 15 MG/DL (ref 8–22)
CALCIUM SERPL-MCNC: 9 MG/DL (ref 8.5–10.5)
CHLORIDE SERPL-SCNC: 102 MMOL/L (ref 96–112)
CO2 SERPL-SCNC: 22 MMOL/L (ref 20–33)
CREAT SERPL-MCNC: 0.83 MG/DL (ref 0.5–1.4)
EOSINOPHIL # BLD AUTO: 0.05 K/UL (ref 0–0.51)
EOSINOPHIL NFR BLD: 0.8 % (ref 0–6.9)
ERYTHROCYTE [DISTWIDTH] IN BLOOD BY AUTOMATED COUNT: 49.8 FL (ref 35.9–50)
FERRITIN SERPL-MCNC: 20.3 NG/ML (ref 22–322)
FOLATE SERPL-MCNC: 15 NG/ML
GLUCOSE SERPL-MCNC: 98 MG/DL (ref 65–99)
GRAM STN SPEC: NORMAL
HCT VFR BLD AUTO: 32.9 % (ref 42–52)
HGB BLD-MCNC: 10.5 G/DL (ref 14–18)
HGB RETIC QN AUTO: 27.3 PG/CELL (ref 29–35)
IMM GRANULOCYTES # BLD AUTO: 0.02 K/UL (ref 0–0.11)
IMM GRANULOCYTES NFR BLD AUTO: 0.3 % (ref 0–0.9)
IMM RETICS NFR: 14.8 % (ref 9.3–17.4)
IRON SATN MFR SERPL: 10 % (ref 15–55)
IRON SERPL-MCNC: 36 UG/DL (ref 50–180)
LYMPHOCYTES # BLD AUTO: 1.91 K/UL (ref 1–4.8)
LYMPHOCYTES NFR BLD: 32 % (ref 22–41)
MCH RBC QN AUTO: 27.2 PG (ref 27–33)
MCHC RBC AUTO-ENTMCNC: 31.9 G/DL (ref 33.7–35.3)
MCV RBC AUTO: 85.2 FL (ref 81.4–97.8)
MONOCYTES # BLD AUTO: 1.17 K/UL (ref 0–0.85)
MONOCYTES NFR BLD AUTO: 19.6 % (ref 0–13.4)
NEUTROPHILS # BLD AUTO: 2.77 K/UL (ref 1.82–7.42)
NEUTROPHILS NFR BLD: 46.5 % (ref 44–72)
NRBC # BLD AUTO: 0 K/UL
NRBC BLD-RTO: 0 /100 WBC
PLATELET # BLD AUTO: 486 K/UL (ref 164–446)
PMV BLD AUTO: 8.9 FL (ref 9–12.9)
POTASSIUM SERPL-SCNC: 3.8 MMOL/L (ref 3.6–5.5)
RBC # BLD AUTO: 3.86 M/UL (ref 4.7–6.1)
RETICS # AUTO: 0.06 M/UL (ref 0.04–0.06)
RETICS/RBC NFR: 1.6 % (ref 0.8–2.1)
SIGNIFICANT IND 70042: NORMAL
SITE SITE: NORMAL
SODIUM SERPL-SCNC: 136 MMOL/L (ref 135–145)
SOURCE SOURCE: NORMAL
TIBC SERPL-MCNC: 360 UG/DL (ref 250–450)
VIT B12 SERPL-MCNC: 450 PG/ML (ref 211–911)
WBC # BLD AUTO: 6 K/UL (ref 4.8–10.8)

## 2020-02-01 PROCEDURE — 83550 IRON BINDING TEST: CPT

## 2020-02-01 PROCEDURE — 700105 HCHG RX REV CODE 258: Performed by: HOSPITALIST

## 2020-02-01 PROCEDURE — 700102 HCHG RX REV CODE 250 W/ 637 OVERRIDE(OP): Performed by: HOSPITALIST

## 2020-02-01 PROCEDURE — 36415 COLL VENOUS BLD VENIPUNCTURE: CPT

## 2020-02-01 PROCEDURE — 82746 ASSAY OF FOLIC ACID SERUM: CPT

## 2020-02-01 PROCEDURE — 770006 HCHG ROOM/CARE - MED/SURG/GYN SEMI*

## 2020-02-01 PROCEDURE — 85046 RETICYTE/HGB CONCENTRATE: CPT

## 2020-02-01 PROCEDURE — A9270 NON-COVERED ITEM OR SERVICE: HCPCS | Performed by: INTERNAL MEDICINE

## 2020-02-01 PROCEDURE — 80048 BASIC METABOLIC PNL TOTAL CA: CPT

## 2020-02-01 PROCEDURE — 82728 ASSAY OF FERRITIN: CPT

## 2020-02-01 PROCEDURE — 700111 HCHG RX REV CODE 636 W/ 250 OVERRIDE (IP): Performed by: INTERNAL MEDICINE

## 2020-02-01 PROCEDURE — 85025 COMPLETE CBC W/AUTO DIFF WBC: CPT

## 2020-02-01 PROCEDURE — 87070 CULTURE OTHR SPECIMN AEROBIC: CPT

## 2020-02-01 PROCEDURE — 700102 HCHG RX REV CODE 250 W/ 637 OVERRIDE(OP): Performed by: INTERNAL MEDICINE

## 2020-02-01 PROCEDURE — 87205 SMEAR GRAM STAIN: CPT

## 2020-02-01 PROCEDURE — 82607 VITAMIN B-12: CPT

## 2020-02-01 PROCEDURE — 93971 EXTREMITY STUDY: CPT | Mod: LT

## 2020-02-01 PROCEDURE — 83540 ASSAY OF IRON: CPT

## 2020-02-01 PROCEDURE — A9270 NON-COVERED ITEM OR SERVICE: HCPCS | Performed by: HOSPITALIST

## 2020-02-01 PROCEDURE — 99233 SBSQ HOSP IP/OBS HIGH 50: CPT | Performed by: HOSPITALIST

## 2020-02-01 PROCEDURE — 700105 HCHG RX REV CODE 258: Performed by: INTERNAL MEDICINE

## 2020-02-01 PROCEDURE — 700111 HCHG RX REV CODE 636 W/ 250 OVERRIDE (IP): Performed by: HOSPITALIST

## 2020-02-01 RX ORDER — THIAMINE MONONITRATE (VIT B1) 100 MG
100 TABLET ORAL DAILY
Status: DISCONTINUED | OUTPATIENT
Start: 2020-02-01 | End: 2020-02-05 | Stop reason: HOSPADM

## 2020-02-01 RX ORDER — LORAZEPAM 2 MG/1
2 TABLET ORAL
Status: DISCONTINUED | OUTPATIENT
Start: 2020-02-01 | End: 2020-02-05 | Stop reason: HOSPADM

## 2020-02-01 RX ORDER — LORAZEPAM 2 MG/ML
0.5 INJECTION INTRAMUSCULAR EVERY 4 HOURS PRN
Status: DISCONTINUED | OUTPATIENT
Start: 2020-02-01 | End: 2020-02-05 | Stop reason: HOSPADM

## 2020-02-01 RX ORDER — LORAZEPAM 2 MG/ML
1 INJECTION INTRAMUSCULAR
Status: DISCONTINUED | OUTPATIENT
Start: 2020-02-01 | End: 2020-02-05 | Stop reason: HOSPADM

## 2020-02-01 RX ORDER — ZINC SULFATE 50(220)MG
220 CAPSULE ORAL DAILY
Status: DISCONTINUED | OUTPATIENT
Start: 2020-02-01 | End: 2020-02-05 | Stop reason: HOSPADM

## 2020-02-01 RX ORDER — FOLIC ACID 1 MG/1
1 TABLET ORAL DAILY
Status: DISCONTINUED | OUTPATIENT
Start: 2020-02-01 | End: 2020-02-05 | Stop reason: HOSPADM

## 2020-02-01 RX ORDER — ASCORBIC ACID 500 MG
1000 TABLET ORAL DAILY
Status: DISCONTINUED | OUTPATIENT
Start: 2020-02-01 | End: 2020-02-05 | Stop reason: HOSPADM

## 2020-02-01 RX ORDER — LORAZEPAM 0.5 MG/1
0.5 TABLET ORAL EVERY 4 HOURS PRN
Status: DISCONTINUED | OUTPATIENT
Start: 2020-02-01 | End: 2020-02-05 | Stop reason: HOSPADM

## 2020-02-01 RX ORDER — LORAZEPAM 1 MG/1
1 TABLET ORAL EVERY 4 HOURS PRN
Status: DISCONTINUED | OUTPATIENT
Start: 2020-02-01 | End: 2020-02-05 | Stop reason: HOSPADM

## 2020-02-01 RX ORDER — LORAZEPAM 2 MG/1
4 TABLET ORAL
Status: DISCONTINUED | OUTPATIENT
Start: 2020-02-01 | End: 2020-02-05 | Stop reason: HOSPADM

## 2020-02-01 RX ORDER — LORAZEPAM 2 MG/ML
2 INJECTION INTRAMUSCULAR
Status: DISCONTINUED | OUTPATIENT
Start: 2020-02-01 | End: 2020-02-05 | Stop reason: HOSPADM

## 2020-02-01 RX ORDER — LORAZEPAM 2 MG/ML
1.5 INJECTION INTRAMUSCULAR
Status: DISCONTINUED | OUTPATIENT
Start: 2020-02-01 | End: 2020-02-05 | Stop reason: HOSPADM

## 2020-02-01 RX ADMIN — AMPICILLIN SODIUM AND SULBACTAM SODIUM 3 G: 2; 1 INJECTION, POWDER, FOR SOLUTION INTRAMUSCULAR; INTRAVENOUS at 05:26

## 2020-02-01 RX ADMIN — SENNOSIDES AND DOCUSATE SODIUM 2 TABLET: 8.6; 5 TABLET ORAL at 17:48

## 2020-02-01 RX ADMIN — OXYCODONE HYDROCHLORIDE AND ACETAMINOPHEN 1000 MG: 500 TABLET ORAL at 12:46

## 2020-02-01 RX ADMIN — Medication 100 MG: at 12:46

## 2020-02-01 RX ADMIN — THERA TABS 1 TABLET: TAB at 12:46

## 2020-02-01 RX ADMIN — VANCOMYCIN HYDROCHLORIDE 1700 MG: 500 INJECTION, POWDER, LYOPHILIZED, FOR SOLUTION INTRAVENOUS at 09:40

## 2020-02-01 RX ADMIN — VANCOMYCIN HYDROCHLORIDE 1000 MG: 500 INJECTION, POWDER, LYOPHILIZED, FOR SOLUTION INTRAVENOUS at 21:23

## 2020-02-01 RX ADMIN — AMPICILLIN SODIUM AND SULBACTAM SODIUM 3 G: 2; 1 INJECTION, POWDER, FOR SOLUTION INTRAMUSCULAR; INTRAVENOUS at 00:28

## 2020-02-01 RX ADMIN — FOLIC ACID 1 MG: 1 TABLET ORAL at 12:46

## 2020-02-01 RX ADMIN — LORAZEPAM 0.5 MG: 0.5 TABLET ORAL at 15:05

## 2020-02-01 RX ADMIN — ZINC SULFATE 220 MG (50 MG) CAPSULE 220 MG: CAPSULE at 12:46

## 2020-02-01 RX ADMIN — DOXYCYCLINE 100 MG: 100 TABLET, FILM COATED ORAL at 05:29

## 2020-02-01 RX ADMIN — AMPICILLIN SODIUM AND SULBACTAM SODIUM 3 G: 2; 1 INJECTION, POWDER, FOR SOLUTION INTRAMUSCULAR; INTRAVENOUS at 12:46

## 2020-02-01 RX ADMIN — SODIUM CHLORIDE 125 MG: 9 INJECTION, SOLUTION INTRAVENOUS at 14:05

## 2020-02-01 RX ADMIN — AMPICILLIN SODIUM AND SULBACTAM SODIUM 3 G: 2; 1 INJECTION, POWDER, FOR SOLUTION INTRAMUSCULAR; INTRAVENOUS at 17:47

## 2020-02-01 ASSESSMENT — LIFESTYLE VARIABLES
ORIENTATION AND CLOUDING OF SENSORIUM: ORIENTED AND CAN DO SERIAL ADDITIONS
PAROXYSMAL SWEATS: NO SWEAT VISIBLE
AUDITORY DISTURBANCES: NOT PRESENT
PAROXYSMAL SWEATS: NO SWEAT VISIBLE
AUDITORY DISTURBANCES: NOT PRESENT
VISUAL DISTURBANCES: NOT PRESENT
VISUAL DISTURBANCES: NOT PRESENT
AUDITORY DISTURBANCES: NOT PRESENT
HEADACHE, FULLNESS IN HEAD: NOT PRESENT
AGITATION: SOMEWHAT MORE THAN NORMAL ACTIVITY
HEADACHE, FULLNESS IN HEAD: MILD
NAUSEA AND VOMITING: NO NAUSEA AND NO VOMITING
TOTAL SCORE: 3
ANXIETY: *
PAROXYSMAL SWEATS: NO SWEAT VISIBLE
TREMOR: TREMOR NOT VISIBLE BUT CAN BE FELT, FINGERTIP TO FINGERTIP
ANXIETY: MILDLY ANXIOUS
SUBSTANCE_ABUSE: 1
TOTAL SCORE: 7
HEADACHE, FULLNESS IN HEAD: NOT PRESENT
NAUSEA AND VOMITING: NO NAUSEA AND NO VOMITING
TOTAL SCORE: 3
AGITATION: SOMEWHAT MORE THAN NORMAL ACTIVITY
NAUSEA AND VOMITING: NO NAUSEA AND NO VOMITING
TREMOR: TREMOR NOT VISIBLE BUT CAN BE FELT, FINGERTIP TO FINGERTIP
VISUAL DISTURBANCES: NOT PRESENT
ANXIETY: MILDLY ANXIOUS
PAROXYSMAL SWEATS: NO SWEAT VISIBLE
ORIENTATION AND CLOUDING OF SENSORIUM: DATE DISORIENTATION BY MORE THAN TWO CALENDAR DAYS
ORIENTATION AND CLOUDING OF SENSORIUM: ORIENTED AND CAN DO SERIAL ADDITIONS
TOTAL SCORE: 4
AUDITORY DISTURBANCES: NOT PRESENT
VISUAL DISTURBANCES: NOT PRESENT
TREMOR: *
ANXIETY: *
TREMOR: TREMOR NOT VISIBLE BUT CAN BE FELT, FINGERTIP TO FINGERTIP
AGITATION: NORMAL ACTIVITY
VISUAL DISTURBANCES: NOT PRESENT
PAROXYSMAL SWEATS: BARELY PERCEPTIBLE SWEATING. PALMS MOIST
HEADACHE, FULLNESS IN HEAD: NOT PRESENT
ORIENTATION AND CLOUDING OF SENSORIUM: CANNOT DO SERIAL ADDITIONS OR IS UNCERTAIN ABOUT DATE
HEADACHE, FULLNESS IN HEAD: NOT PRESENT
NAUSEA AND VOMITING: NO NAUSEA AND NO VOMITING
ORIENTATION AND CLOUDING OF SENSORIUM: ORIENTED AND CAN DO SERIAL ADDITIONS
AUDITORY DISTURBANCES: NOT PRESENT
AGITATION: *
TREMOR: TREMOR NOT VISIBLE BUT CAN BE FELT, FINGERTIP TO FINGERTIP
ANXIETY: *
TOTAL SCORE: 11
AGITATION: *
NAUSEA AND VOMITING: NO NAUSEA AND NO VOMITING

## 2020-02-01 ASSESSMENT — ENCOUNTER SYMPTOMS
COUGH: 0
WEAKNESS: 0
STRIDOR: 0
BACK PAIN: 0
CHILLS: 0
NERVOUS/ANXIOUS: 1
WHEEZING: 0
NECK PAIN: 0
FLANK PAIN: 0
DIARRHEA: 0
TREMORS: 0
DIAPHORESIS: 0
SHORTNESS OF BREATH: 0
SPEECH CHANGE: 0
HALLUCINATIONS: 0
FEVER: 0
PALPITATIONS: 0
FOCAL WEAKNESS: 0
VOMITING: 0
ABDOMINAL PAIN: 0
SENSORY CHANGE: 0

## 2020-02-01 NOTE — PROGRESS NOTES
[facility] Medicine Daily Progress Note    Date of Service  2/1/2020    Chief Complaint  64 y.o. male admitted 1/31/2020 with non healing left leg wound and foul- smelling discharge.     Hospital Course    History of restless leg syndrome, alcohol abuse, psychiatric disorder, tobacco use, necrotizing fasciitis,  w nonhealing left leg wound for 1 year plus following injury with fall admitted with signs of infection wound infection with malodorous smell and drainage over several weeks.  Patient initiated on antibiotics, wound care.   plastic surgery was consulted from ER to consult.    Interval Problem Update    Periods of severe agitation requiring security.  Records indicate hx of MRSA.  No tremors, tachycardia.  Wound culture sent.     Consultants/Specialty    Plastic surgery to consult    Code Status    Full code    Disposition    Patient homeless, to be determined    Review of Systems  Review of Systems   Constitutional: Negative for chills, diaphoresis and fever.   HENT: Negative for congestion.    Respiratory: Negative for cough, shortness of breath, wheezing and stridor.    Cardiovascular: Negative for chest pain, palpitations and leg swelling.   Gastrointestinal: Negative for abdominal pain, diarrhea and vomiting.   Genitourinary: Negative for flank pain and hematuria.   Musculoskeletal: Negative for back pain, joint pain and neck pain.        Left leg wounds   Neurological: Negative for tremors, sensory change, speech change, focal weakness and weakness.   Psychiatric/Behavioral: Positive for substance abuse. Negative for hallucinations. The patient is nervous/anxious.         Physical Exam  Temp:  [36.7 °C (98.1 °F)-37.4 °C (99.4 °F)] 37 °C (98.6 °F)  Pulse:  [] 64  Resp:  [16-20] 18  BP: ()/(59-99) 153/92  SpO2:  [95 %-98 %] 95 %    Physical Exam  Constitutional:       General: He is in acute distress (Anxious).      Appearance: He is not diaphoretic.      Comments: Disheveled, unkept  appearance  Easily agitated   HENT:      Head: Normocephalic and atraumatic.      Right Ear: External ear normal.      Left Ear: External ear normal.      Nose: Nose normal.   Eyes:      General: No scleral icterus.     Conjunctiva/sclera: Conjunctivae normal.      Pupils: Pupils are equal, round, and reactive to light.   Neck:      Musculoskeletal: Normal range of motion. No neck rigidity.   Cardiovascular:      Rate and Rhythm: Normal rate and regular rhythm.      Heart sounds: No murmur.   Pulmonary:      Breath sounds: No stridor. No wheezing, rhonchi or rales.   Abdominal:      General: There is no distension.      Palpations: Abdomen is soft.      Tenderness: There is no tenderness.   Musculoskeletal:         General: No swelling.      Comments: Left lower extremity diffusely swollen.  Wound dressed with  large open wound- lower posterior thigh extending down medial and posterior calf.  Mild purulence. No malodor.      Skin:     General: Skin is dry.   Neurological:      General: No focal deficit present.      Mental Status: He is alert and oriented to person, place, and time.      Comments: No tremors    Psychiatric:         Mood and Affect: Mood is anxious. Affect is labile.         Fluids    Intake/Output Summary (Last 24 hours) at 2/1/2020 0803  Last data filed at 2/1/2020 0400  Gross per 24 hour   Intake 1060 ml   Output --   Net 1060 ml       Laboratory  Recent Labs     01/31/20  1302 02/01/20  0338   WBC 5.7 6.0   RBC 4.32* 3.86*   HEMOGLOBIN 11.7* 10.5*   HEMATOCRIT 37.5* 32.9*   MCV 86.8 85.2   MCH 27.1 27.2   MCHC 31.2* 31.9*   RDW 51.8* 49.8   PLATELETCT 535* 486*   MPV 8.7* 8.9*     Recent Labs     01/31/20  1302 02/01/20  0338   SODIUM 139 136   POTASSIUM 4.1 3.8   CHLORIDE 106 102   CO2 18* 22   GLUCOSE 91 98   BUN 10 15   CREATININE 0.69 0.83   CALCIUM 9.1 9.0                   Imaging  US-EXTREMITY VENOUS LOWER UNILAT LEFT    (Results Pending)        Assessment/Plan  * Leg wound,  left  Assessment & Plan  Chronic, nonhealing, with foul-smelling discharge, likely infected.  He is homelessness with alcoholism complicating treatment.  Continue with IV Unasyn.  History of MRSA per 5/19 wound culture that was resistant to doxycycline.  Start IV vancomycin.    Discussed with RN - wound culture sent.   De-escalate antibiotics depending on findings.  Continue with wound care  Plastic surgery consult by ER-look forward to input  Follow-up ultrasound left lower extremity to eval for DVT    LFT elevation  Assessment & Plan  AST elevated likely secondary to alcohol abuse.  Continue to emphasize alcohol cessation     Anemia- (present on admission)  Assessment & Plan  Patient denies melena no vomiting with blood.  Iron deficient with low ferritin  Start IV iron  Follow clinically    Alcohol use- (present on admission)  Assessment & Plan  History of delirium tremens.  No withdrawal symptoms.  Alcohol level elevated  Continue with vitamin B1 supplementation.  Add CIWA protocol  Monitor for withdrawal symptoms    Tobacco use- (present on admission)  Assessment & Plan  Tobacco cessation counseling started.    Nicotine patch replacement.    Continue to emphasize stop smoking.           VTE prophylaxis: Lovenox

## 2020-02-01 NOTE — ASSESSMENT & PLAN NOTE
History of delirium tremens.  No withdrawal symptoms. Periods of agitaiton and anxiety . Alcohol level initially elevated.  No signs of withdrawal.  Continue with vitamin B1 supplementation.    Prn Ativan.

## 2020-02-01 NOTE — ASSESSMENT & PLAN NOTE
Chronic, nonhealing, with foul-smelling discharge, likely infected.  He is homelessness with alcoholism complicating treatment.  US no DVT.  MRSA per 5/19 wound culture that was resistant to doxycycline.  Cultures during this admission negative.  Will de-escalate antibiotics to Bactrim.  Plastic surgery has assessed patient and felt although wound is long, it is also narrow and has potential to de-epithelialize and heal.  Plan to continue wound care, nutrition support , assess compliance.  I discussed with plastics,  Dr. Cody, to reevaluate towards the end of the week to monitor progress.  He may need a skin flap but there is considerable concern for poor support and compliance issues

## 2020-02-01 NOTE — PROGRESS NOTES
· 2 RN skin check complete with Felipe TOBAR.   · Devices in place peripheral IV  · Skin assessed under devices yes.  · Confirmed pressure ulcers found on N/A  · New potential pressure ulcers noted on N/A Wound consult placed? yes. Photo uploaded? yes  · Pt is noted to have a large full thickness wound on his left lower extremity.

## 2020-02-01 NOTE — PROGRESS NOTES
Patient arrived to the unit from ED via gurney with transport. Pt able to ambulate from bed in hallway to his bed in the room with minimal assistance. Assessment competed, VSS, pt is A/Ox4. Reports 10/10 pain in his left leg, medicated with PRN meds for some relief. Pt denies any further needs. Bed in the lowest locked position, call light in reach.

## 2020-02-01 NOTE — PROGRESS NOTES
"Pharmacy Kinetics 64 y.o. male on vancomycin day # 1    2020    Currently on Vancomycin 1700 mg iv x1: 20@0940  Provider specified end date: TBD    Indication for Treatment: SSTI    Pertinent history per medical record: Admitted on 2020 for Nonhealing left leg wound. 63 y/o. male with history of alcohol abuse,psychiatric problems. Patient has this wound for about 1 year, that started after injury from a fall. Started having drainage with malodorous smell several weeks ago. Has seen at wound clinic for this wound in the past, but he proved to be noncompliant with wound care follow-up.     Other antibiotics: Unasyn 3g     Allergies: Patient has no known allergies.     List concerns for renal function : None     Pertinent cultures to date:   None     MRSA nares swab if pneumonia is a concern (ordered/positive/negative/n-a): NA     Recent Labs     20  1302 20  0338   WBC 5.7 6.0   NEUTSPOLYS 43.10* 46.50     Recent Labs     20  1302 20  0338   BUN 10 15   CREATININE 0.69 0.83   ALBUMIN 3.9  --      No results for input(s): VANCOTROUGH, VANCOPEAK, VANCORANDOM in the last 72 hours.    Intake/Output Summary (Last 24 hours) at 2020 1022  Last data filed at 2020 0400  Gross per 24 hour   Intake 1060 ml   Output --   Net 1060 ml      /85   Pulse 83   Temp 37.3 °C (99.2 °F) (Temporal)   Resp 16   Ht 1.778 m (5' 10\")   Wt 68.7 kg (151 lb 7.3 oz)   SpO2 97%  Temp (24hrs), Av.1 °C (98.8 °F), Min:36.7 °C (98.1 °F), Max:37.4 °C (99.4 °F)      A/P   1. Vancomycin dose change: Vancomycin 1000mg iv q12hr (1000 2200)  2. Next vancomycin level: 20@2130  3. Goal trough: 12-16 mcg/mL   4. Comments: No leukocytosis , afebrile. Renal indices increased over interval , trending daily. Wound care/plastic consult ordered per MD. Vancomycin initiated per protocol , level ordered.     Richmond Neves PharmD BCPS   "

## 2020-02-01 NOTE — CARE PLAN
Problem: Safety  Goal: Will remain free from injury  Outcome: PROGRESSING AS EXPECTED  Goal: Will remain free from falls  Outcome: PROGRESSING AS EXPECTED     Problem: Infection  Goal: Will remain free from infection  Outcome: PROGRESSING AS EXPECTED  Note:   Wound culture obtained and sent to lab, results pending. Dressing care completed and new IV abx infused per MAR.

## 2020-02-01 NOTE — CARE PLAN
Problem: Safety  Goal: Will remain free from falls  Outcome: PROGRESSING AS EXPECTED  Intervention: Assess risk factors for falls  Flowsheets (Taken 1/31/2020 234)  Mobility Status Assessment: 0-Ambulates & Transfers Independently. No Assistance Required  Note:   Pt is steady on his feet. Ambulated safely with minimal assistance.      Problem: Pain Management  Goal: Pain level will decrease to patient's comfort goal  Outcome: PROGRESSING SLOWER THAN EXPECTED  Flowsheets (Taken 1/31/2020 5504)  Pain Rating Scale (NPRS): 10  Note:   Pt medicated with PRN Tylenol for moderated relief from the pain in his left leg.

## 2020-02-01 NOTE — ASSESSMENT & PLAN NOTE
Tobacco cessation counseling started.    Nicotine patch replacement.    Continue to emphasize stop smoking.

## 2020-02-01 NOTE — ASSESSMENT & PLAN NOTE
Patient denies melena no vomiting with blood.  Iron deficient with low ferritin  IV iron replacement

## 2020-02-01 NOTE — H&P
Hospital Medicine History & Physical Note    Date of Service  1/31/2020    Primary Care Physician  No primary care provider on file.    Consultants  Plastic surgery    Code Status  Full code    Chief Complaint  Nonhealing left leg wound with foul-smelling discharge    History of Presenting Illness  64 y.o. male with history of alcohol abuse, restless leg syndrome, psychiatric problems, smoking, nonhealing left leg wound, who presented 1/31/2020 with nonhealing left leg wound for evaluation.  Patient has this wound for about 1 year, that started after injury from a fall.  Patient started having drainage with malodorous smell several weeks ago.  She denies much pain in the left leg.  He denies chills or fever.  Patient was seen at wound clinic for this wound in the past, but he proved to be noncompliant with wound care follow-up.  Reportedly they recommended plastic surgery consultation.  ERP discussed patient with Dr. Finley from plastic surgery, who agreed to see the patient.  He drinks 2-3 beers a day on and off.  He smokes 2 to 3 cigarettes a day.  He is homeless.        Review of Systems  Review of Systems   Constitutional: Negative for chills, fever and weight loss.   HENT: Negative for ear pain, hearing loss and tinnitus.    Eyes: Negative for blurred vision, double vision and photophobia.   Respiratory: Negative for cough, hemoptysis and sputum production.    Cardiovascular: Negative for chest pain, palpitations and orthopnea.   Gastrointestinal: Negative for heartburn, nausea and vomiting.   Genitourinary: Negative for dysuria, flank pain, frequency and hematuria.   Musculoskeletal: Negative for back pain, joint pain and neck pain.   Skin: Negative for itching and rash.        Nonhealing left leg wound   Neurological: Negative for tremors, speech change, focal weakness and headaches.   Endo/Heme/Allergies: Negative for environmental allergies and polydipsia. Does not bruise/bleed easily.    Psychiatric/Behavioral: Negative for hallucinations and substance abuse. The patient is not nervous/anxious.        Past Medical History   has a past medical history of Psychiatric problem, Restless leg, and Tobacco use.    Surgical History   has a past surgical history that includes ankle orif (Right).     Family History  Heart Disease in his mother; No Known Problems in his father.     Social History  He smokes 2 to 3 cigarettes a day. He has smoked for the past 40.00 years. He has never used smokeless tobacco. He reports current alcohol use.  He drinks 2-3 beers a day he reports that he does not use drugs.    Allergies  No Known Allergies    Medications  None       Physical Exam  Temp:  [36.7 °C (98.1 °F)] 36.7 °C (98.1 °F)  Pulse:  [] 83  Resp:  [16-20] 16  BP: ()/(59-74) 119/74  SpO2:  [95 %-97 %] 95 %    Physical Exam  Vitals signs and nursing note reviewed.   Constitutional:       General: He is not in acute distress.     Comments: Disheveled   HENT:      Head: Normocephalic and atraumatic.      Nose: Nose normal.      Mouth/Throat:      Mouth: Mucous membranes are moist.   Eyes:      Extraocular Movements: Extraocular movements intact.      Pupils: Pupils are equal, round, and reactive to light.   Neck:      Musculoskeletal: Normal range of motion and neck supple.   Cardiovascular:      Rate and Rhythm: Normal rate and regular rhythm.   Pulmonary:      Effort: Pulmonary effort is normal.      Breath sounds: Normal breath sounds.   Abdominal:      General: Abdomen is flat. There is no distension.      Tenderness: There is no tenderness.   Musculoskeletal: Normal range of motion.         General: No swelling or deformity.      Comments: large open wound the posterior aspect on the medial aspect with foul-smelling discharge.  No significant periwound erythema   Skin:     General: Skin is warm and dry.   Neurological:      General: No focal deficit present.      Mental Status: He is alert and  oriented to person, place, and time.   Psychiatric:         Mood and Affect: Mood normal.         Behavior: Behavior normal.         Laboratory:  Recent Labs     01/31/20  1302   WBC 5.7   RBC 4.32*   HEMOGLOBIN 11.7*   HEMATOCRIT 37.5*   MCV 86.8   MCH 27.1   MCHC 31.2*   RDW 51.8*   PLATELETCT 535*   MPV 8.7*     Recent Labs     01/31/20  1302   SODIUM 139   POTASSIUM 4.1   CHLORIDE 106   CO2 18*   GLUCOSE 91   BUN 10   CREATININE 0.69   CALCIUM 9.1     Recent Labs     01/31/20  1302   ALTSGPT 48   ASTSGOT 53*   ALKPHOSPHAT 105*   TBILIRUBIN 0.3   GLUCOSE 91         No results for input(s): NTPROBNP in the last 72 hours.      No results for input(s): TROPONINT in the last 72 hours.    Urinalysis:    No results found     Imaging:  US-EXTREMITY VENOUS LOWER UNILAT LEFT    (Results Pending)         Assessment/Plan:  I anticipate this patient will require at least two midnights for appropriate medical management, necessitating inpatient admission.    Leg wound, left  Assessment & Plan  Chronic, nonhealing, with foul-smelling discharge, likely infected.  Healing is complicated with homelessness and alcoholism  Started on empiric antibiotics  Wound culture ordered  Wound care ordered  Plastic surgery consult pending  Ordered lower extremity DVT study    LFT elevation  Assessment & Plan  AST elevated likely secondary to alcohol abuse.  Alcohol cessation counseling    Anemia- (present on admission)  Assessment & Plan  Patient denies melena no vomiting with blood  We will order iron studies, vitamin B12 and folic acid level    Alcohol use- (present on admission)  Assessment & Plan  History of delirium tremens.  Not currently in withdrawal  Alcohol level elevated  We will start vitamin B1 supplementation and CIWA protocol    Tobacco use- (present on admission)  Assessment & Plan  Spent approx 5 mins on Tobacco cessation education . Discussed options of nicotine patch, medical treatment with wellbutrin and chantix. Discussed  the benefits of quitting smoking and risks of continued smoking including cardiovascular disease, cancer and COPD.   Code 79568        VTE prophylaxis: Heparin

## 2020-02-02 LAB
ANION GAP SERPL CALC-SCNC: 11 MMOL/L (ref 0–11.9)
BUN SERPL-MCNC: 8 MG/DL (ref 8–22)
CALCIUM SERPL-MCNC: 9.5 MG/DL (ref 8.5–10.5)
CHLORIDE SERPL-SCNC: 101 MMOL/L (ref 96–112)
CO2 SERPL-SCNC: 24 MMOL/L (ref 20–33)
CREAT SERPL-MCNC: 0.86 MG/DL (ref 0.5–1.4)
GLUCOSE SERPL-MCNC: 103 MG/DL (ref 65–99)
POTASSIUM SERPL-SCNC: 3.9 MMOL/L (ref 3.6–5.5)
SODIUM SERPL-SCNC: 136 MMOL/L (ref 135–145)
VANCOMYCIN TROUGH SERPL-MCNC: 21.9 UG/ML (ref 10–20)

## 2020-02-02 PROCEDURE — 700111 HCHG RX REV CODE 636 W/ 250 OVERRIDE (IP): Performed by: HOSPITALIST

## 2020-02-02 PROCEDURE — 700105 HCHG RX REV CODE 258: Performed by: HOSPITALIST

## 2020-02-02 PROCEDURE — A9270 NON-COVERED ITEM OR SERVICE: HCPCS | Performed by: HOSPITALIST

## 2020-02-02 PROCEDURE — 99232 SBSQ HOSP IP/OBS MODERATE 35: CPT | Performed by: HOSPITALIST

## 2020-02-02 PROCEDURE — 36415 COLL VENOUS BLD VENIPUNCTURE: CPT

## 2020-02-02 PROCEDURE — 770006 HCHG ROOM/CARE - MED/SURG/GYN SEMI*

## 2020-02-02 PROCEDURE — 700105 HCHG RX REV CODE 258: Performed by: INTERNAL MEDICINE

## 2020-02-02 PROCEDURE — 700111 HCHG RX REV CODE 636 W/ 250 OVERRIDE (IP): Performed by: INTERNAL MEDICINE

## 2020-02-02 PROCEDURE — 80202 ASSAY OF VANCOMYCIN: CPT

## 2020-02-02 PROCEDURE — 80048 BASIC METABOLIC PNL TOTAL CA: CPT

## 2020-02-02 PROCEDURE — 700102 HCHG RX REV CODE 250 W/ 637 OVERRIDE(OP): Performed by: HOSPITALIST

## 2020-02-02 RX ADMIN — AMPICILLIN SODIUM AND SULBACTAM SODIUM 3 G: 2; 1 INJECTION, POWDER, FOR SOLUTION INTRAMUSCULAR; INTRAVENOUS at 11:59

## 2020-02-02 RX ADMIN — SODIUM CHLORIDE 125 MG: 9 INJECTION, SOLUTION INTRAVENOUS at 07:59

## 2020-02-02 RX ADMIN — VANCOMYCIN HYDROCHLORIDE 1000 MG: 500 INJECTION, POWDER, LYOPHILIZED, FOR SOLUTION INTRAVENOUS at 21:58

## 2020-02-02 RX ADMIN — VANCOMYCIN HYDROCHLORIDE 1000 MG: 500 INJECTION, POWDER, LYOPHILIZED, FOR SOLUTION INTRAVENOUS at 12:50

## 2020-02-02 RX ADMIN — LORAZEPAM 1 MG: 2 INJECTION INTRAMUSCULAR; INTRAVENOUS at 00:13

## 2020-02-02 RX ADMIN — Medication 100 MG: at 05:53

## 2020-02-02 RX ADMIN — FOLIC ACID 1 MG: 1 TABLET ORAL at 05:53

## 2020-02-02 RX ADMIN — ENOXAPARIN SODIUM 40 MG: 100 INJECTION SUBCUTANEOUS at 21:25

## 2020-02-02 RX ADMIN — ZINC SULFATE 220 MG (50 MG) CAPSULE 220 MG: CAPSULE at 05:53

## 2020-02-02 RX ADMIN — AMPICILLIN SODIUM AND SULBACTAM SODIUM 3 G: 2; 1 INJECTION, POWDER, FOR SOLUTION INTRAMUSCULAR; INTRAVENOUS at 18:00

## 2020-02-02 RX ADMIN — AMPICILLIN SODIUM AND SULBACTAM SODIUM 3 G: 2; 1 INJECTION, POWDER, FOR SOLUTION INTRAMUSCULAR; INTRAVENOUS at 00:15

## 2020-02-02 RX ADMIN — AMPICILLIN SODIUM AND SULBACTAM SODIUM 3 G: 2; 1 INJECTION, POWDER, FOR SOLUTION INTRAMUSCULAR; INTRAVENOUS at 05:53

## 2020-02-02 RX ADMIN — OXYCODONE HYDROCHLORIDE AND ACETAMINOPHEN 1000 MG: 500 TABLET ORAL at 05:53

## 2020-02-02 RX ADMIN — THERA TABS 1 TABLET: TAB at 05:53

## 2020-02-02 ASSESSMENT — LIFESTYLE VARIABLES
AGITATION: NORMAL ACTIVITY
HEADACHE, FULLNESS IN HEAD: NOT PRESENT
VISUAL DISTURBANCES: NOT PRESENT
PAROXYSMAL SWEATS: NO SWEAT VISIBLE
SUBSTANCE_ABUSE: 1
TREMOR: TREMOR NOT VISIBLE BUT CAN BE FELT, FINGERTIP TO FINGERTIP
AGITATION: NORMAL ACTIVITY
AUDITORY DISTURBANCES: NOT PRESENT
PAROXYSMAL SWEATS: NO SWEAT VISIBLE
ANXIETY: NO ANXIETY (AT EASE)
AGITATION: NORMAL ACTIVITY
PAROXYSMAL SWEATS: NO SWEAT VISIBLE
NAUSEA AND VOMITING: NO NAUSEA AND NO VOMITING
ANXIETY: NO ANXIETY (AT EASE)
TREMOR: TREMOR NOT VISIBLE BUT CAN BE FELT, FINGERTIP TO FINGERTIP
TOTAL SCORE: 2
HEADACHE, FULLNESS IN HEAD: NOT PRESENT
AUDITORY DISTURBANCES: NOT PRESENT
ORIENTATION AND CLOUDING OF SENSORIUM: CANNOT DO SERIAL ADDITIONS OR IS UNCERTAIN ABOUT DATE
VISUAL DISTURBANCES: NOT PRESENT
TOTAL SCORE: 4
ANXIETY: NO ANXIETY (AT EASE)
TOTAL SCORE: 4
ORIENTATION AND CLOUDING OF SENSORIUM: DATE DISORIENTATION BY MORE THAN TWO CALENDAR DAYS
NAUSEA AND VOMITING: NO NAUSEA AND NO VOMITING
AUDITORY DISTURBANCES: NOT PRESENT
ORIENTATION AND CLOUDING OF SENSORIUM: DATE DISORIENTATION BY MORE THAN TWO CALENDAR DAYS
TREMOR: TREMOR NOT VISIBLE BUT CAN BE FELT, FINGERTIP TO FINGERTIP
HEADACHE, FULLNESS IN HEAD: NOT PRESENT
NAUSEA AND VOMITING: NO NAUSEA AND NO VOMITING
VISUAL DISTURBANCES: NOT PRESENT

## 2020-02-02 ASSESSMENT — ENCOUNTER SYMPTOMS
CHILLS: 0
PALPITATIONS: 0
WEAKNESS: 0
DIAPHORESIS: 0
NECK PAIN: 0
STRIDOR: 0
SENSORY CHANGE: 0
DIARRHEA: 0
VOMITING: 0
SHORTNESS OF BREATH: 0
ABDOMINAL PAIN: 0
NERVOUS/ANXIOUS: 1
FEVER: 0
HALLUCINATIONS: 0
FOCAL WEAKNESS: 0
SPEECH CHANGE: 0
BACK PAIN: 0
COUGH: 0

## 2020-02-02 NOTE — CARE PLAN
Problem: Knowledge Deficit  Goal: Knowledge of the prescribed therapeutic regimen will improve  Outcome: PROGRESSING SLOWER THAN EXPECTED  Note:   Pt is forgetful and requires frequent reminders about the plan of care.      Problem: Psychosocial Needs:  Goal: Level of anxiety will decrease  2/1/2020 2019 by Margarita Zendejas R.N.  Outcome: PROGRESSING SLOWER THAN EXPECTED  Flowsheets (Taken 2/1/2020 2019)  Patient Behaviors: Pacing ; Restless

## 2020-02-02 NOTE — PROGRESS NOTES
Assumed care of patient at 1900 from Leonela TOBAR. Pt resting in bed but appears anxious and restless. Assessment competed, VSS, pt is A/Ox4.Pt is noted to be pacing the hallways and around his room. Pt reports he is restless because he is bored. CIWA assessment completed, no medication required. He continues to be forgetful and requires frequent reminders of where he room is, why he is in the hospital, and his plan of care.  Pt denies any further needs. Bed in the lowest locked position, call light in reach.

## 2020-02-02 NOTE — PROGRESS NOTES
Pt very forgetful throughout the day, needing several reminders of where he is, what has occurred throughout the day etc. Restless at times, pacing at times. CIWA's scored appropriately and medicated per protocol.

## 2020-02-02 NOTE — PROGRESS NOTES
"Pharmacy Kinetics 64 y.o. male on vancomycin day # 2    2020    Currently on Vancomycin 1000mg iv q12hr (1000 2200)  Provider specified end date: TBD     Indication for Treatment: SSTI     Pertinent history per medical record: Admitted on 2020 for Nonhealing left leg wound. 65 y/o. male with history of alcohol abuse,psychiatric problems. Patient has this wound for about 1 year, that started after injury from a fall. Started having drainage with malodorous smell several weeks ago. Has seen at wound clinic for this wound in the past, but he proved to be noncompliant with wound care follow-up.      Other antibiotics: Unasyn 3g iv q6hr     Allergies: Patient has no known allergies.      List concerns for renal function : None      Pertinent cultures to date:   20:Lt Lg,WND: Rare Gram positive cocci.      MRSA nares swab if pneumonia is a concern (ordered/positive/negative/n-a): NA     Recent Labs     20  1302 20  0338   WBC 5.7 6.0   NEUTSPOLYS 43.10* 46.50     Recent Labs     20  1302 20  0338   BUN 10 15   CREATININE 0.69 0.83   ALBUMIN 3.9  --      No results for input(s): VANCOTROUGH, VANCOPEAK, VANCORANDOM in the last 72 hours.    Intake/Output Summary (Last 24 hours) at 2020 1044  Last data filed at 2020 0600  Gross per 24 hour   Intake 1080 ml   Output 450 ml   Net 630 ml      /73   Pulse 74   Temp 36.8 °C (98.3 °F) (Temporal)   Resp 18   Ht 1.778 m (5' 10\")   Wt 68.7 kg (151 lb 7.3 oz)   SpO2 94%  Temp (24hrs), Av.8 °C (98.2 °F), Min:36.7 °C (98 °F), Max:36.8 °C (98.3 °F)      A/P        1. Vancomycin dose change: No change  2. Next vancomycin level: 20@2130  3. Goal trough: 12-16 mcg/mL   4. Comments: Last CBC no leukocytosis , afebrile. Last renal indices increased over interval , BMP ordered. Vancomycin initiated per protocol , level ordered. Following culture C&S.      Richmond Neves PharmD BCPS   "

## 2020-02-02 NOTE — PROGRESS NOTES
[facility] Medicine Daily Progress Note    Date of Service  2/2/2020    Chief Complaint  64 y.o. male admitted 1/31/2020 with non healing left leg wound and foul- smelling discharge.     Hospital Course    History of restless leg syndrome, alcohol abuse, psychiatric disorder, tobacco use, necrotizing fasciitis,  w nonhealing left leg wound for 1 year plus following injury with fall admitted with signs of infection wound infection with malodorous smell and drainage over several weeks.  Patient initiated on antibiotics, wound care.   plastic surgery was consulted from ER to consult.    Interval Problem Update    Periods of anxiety - calmer this morning.  Pain controlled. Afebrile.     Consultants/Specialty    Plastic surgery to consult    Code Status    Full code    Disposition    Patient homeless, to be determined    Review of Systems  Review of Systems   Constitutional: Negative for chills, diaphoresis and fever.   HENT: Negative for congestion.    Respiratory: Negative for cough, shortness of breath and stridor.    Cardiovascular: Negative for chest pain, palpitations and leg swelling.   Gastrointestinal: Negative for abdominal pain, diarrhea and vomiting.   Musculoskeletal: Negative for back pain, joint pain and neck pain.        Left leg wounds   Neurological: Negative for sensory change, speech change, focal weakness and weakness.   Psychiatric/Behavioral: Positive for substance abuse. Negative for hallucinations. The patient is nervous/anxious.         Physical Exam  Temp:  [36.7 °C (98 °F)-36.8 °C (98.3 °F)] 36.8 °C (98.3 °F)  Pulse:  [70-81] 74  Resp:  [18-20] 18  BP: (127-149)/(73-96) 127/73  SpO2:  [93 %-98 %] 94 %    Physical Exam  Constitutional:       General: He is in acute distress (Anxious).      Appearance: He is not diaphoretic.      Comments: Disheveled, unkept appearance  Drowsy, arouseable, no apparent distress.    HENT:      Head: Normocephalic and atraumatic.      Right Ear: External ear  normal.      Left Ear: External ear normal.      Nose: Nose normal.   Eyes:      General: No scleral icterus.     Conjunctiva/sclera: Conjunctivae normal.      Pupils: Pupils are equal, round, and reactive to light.   Neck:      Musculoskeletal: Normal range of motion. No neck rigidity.   Cardiovascular:      Rate and Rhythm: Normal rate and regular rhythm.      Heart sounds: No murmur.   Pulmonary:      Breath sounds: No stridor. No wheezing, rhonchi or rales.   Abdominal:      General: There is no distension.      Palpations: Abdomen is soft.      Tenderness: There is no tenderness.   Musculoskeletal:         General: No swelling.      Comments: Left lower extremity diffusely swollen.  Large open wound- lower posterior thigh extending down medial and posterior calf.  . No malodor.  Dressed.      Skin:     General: Skin is dry.   Neurological:      General: No focal deficit present.      Mental Status: He is alert and oriented to person, place, and time.         Fluids    Intake/Output Summary (Last 24 hours) at 2/2/2020 0922  Last data filed at 2/2/2020 0600  Gross per 24 hour   Intake 1320 ml   Output 550 ml   Net 770 ml       Laboratory  Recent Labs     01/31/20  1302 02/01/20  0338   WBC 5.7 6.0   RBC 4.32* 3.86*   HEMOGLOBIN 11.7* 10.5*   HEMATOCRIT 37.5* 32.9*   MCV 86.8 85.2   MCH 27.1 27.2   MCHC 31.2* 31.9*   RDW 51.8* 49.8   PLATELETCT 535* 486*   MPV 8.7* 8.9*     Recent Labs     01/31/20  1302 02/01/20  0338   SODIUM 139 136   POTASSIUM 4.1 3.8   CHLORIDE 106 102   CO2 18* 22   GLUCOSE 91 98   BUN 10 15   CREATININE 0.69 0.83   CALCIUM 9.1 9.0                   Imaging  US-EXTREMITY VENOUS LOWER UNILAT LEFT   Final Result      IR-US GUIDED PIV   Final Result    Ultrasound-guided PERIPHERAL IV INSERTION performed by    qualified nursing staff as above.                 Assessment/Plan  * Leg wound, left  Assessment & Plan  Chronic, nonhealing, with foul-smelling discharge, likely infected.  He is  homelessness with alcoholism complicating treatment.   no DVT.   MRSA per 5/19 wound culture that was resistant to doxycycline.  Continue IV Unasyn, Vancomycin.    Fu wound culture -  De-escalate antibiotics depending on findings.      LFT elevation  Assessment & Plan  AST elevated likely secondary to alcohol abuse.  Continue to emphasize alcohol cessation     Anemia- (present on admission)  Assessment & Plan  Patient denies melena no vomiting with blood.  Iron deficient with low ferritin  Start IV iron  Follow clinically    Alcohol use- (present on admission)  Assessment & Plan  History of delirium tremens.  No withdrawal symptoms. Periods of agitaiton and anxiety . Alcohol level initially elevated  Continue with vitamin B1 supplementation.    Prn Ativan.  Monitor for withdrawal symptoms    Tobacco use- (present on admission)  Assessment & Plan  Tobacco cessation counseling started.    Nicotine patch replacement.    Continue to emphasize stop smoking.           VTE prophylaxis: Lovenox

## 2020-02-02 NOTE — PROGRESS NOTES
"Received bedside report from Night RN. Pt was asleep during report. When he was awake he was able to state his name, the name of this facility and year. Noted very slight hand tremor. He ate his breakfast. /73   Pulse 74   Temp 36.8 °C (98.3 °F) (Temporal)   Resp 18   Ht 1.778 m (5' 10\")   Wt 68.7 kg (151 lb 7.3 oz)   SpO2 94%   BMI 21.73 kg/m²     "

## 2020-02-02 NOTE — CARE PLAN
Problem: Safety  Goal: Will remain free from injury  Outcome: PROGRESSING AS EXPECTED   Safety precautions in use including use of call bell for assistance, bed in lowest position, bed/chair alarm utilized and use of treaded socks. Objects in room moved to allow access to bathroom     Problem: Knowledge Deficit  Goal: Knowledge of disease process/condition, treatment plan, diagnostic tests, and medications will improve  Outcome: PROGRESSING SLOWER THAN EXPECTED   Provide ativan as needed for alcohol withdrawal.

## 2020-02-03 LAB
BACTERIA WND AEROBE CULT: NORMAL
GRAM STN SPEC: NORMAL
SIGNIFICANT IND 70042: NORMAL
SITE SITE: NORMAL
SOURCE SOURCE: NORMAL

## 2020-02-03 PROCEDURE — 700102 HCHG RX REV CODE 250 W/ 637 OVERRIDE(OP): Performed by: HOSPITALIST

## 2020-02-03 PROCEDURE — 700111 HCHG RX REV CODE 636 W/ 250 OVERRIDE (IP): Performed by: HOSPITALIST

## 2020-02-03 PROCEDURE — 99232 SBSQ HOSP IP/OBS MODERATE 35: CPT | Performed by: HOSPITALIST

## 2020-02-03 PROCEDURE — A9270 NON-COVERED ITEM OR SERVICE: HCPCS | Performed by: INTERNAL MEDICINE

## 2020-02-03 PROCEDURE — 770006 HCHG ROOM/CARE - MED/SURG/GYN SEMI*

## 2020-02-03 PROCEDURE — 700102 HCHG RX REV CODE 250 W/ 637 OVERRIDE(OP): Performed by: INTERNAL MEDICINE

## 2020-02-03 PROCEDURE — A9270 NON-COVERED ITEM OR SERVICE: HCPCS | Performed by: HOSPITALIST

## 2020-02-03 PROCEDURE — 700105 HCHG RX REV CODE 258: Performed by: HOSPITALIST

## 2020-02-03 PROCEDURE — 700111 HCHG RX REV CODE 636 W/ 250 OVERRIDE (IP): Performed by: INTERNAL MEDICINE

## 2020-02-03 PROCEDURE — 700105 HCHG RX REV CODE 258: Performed by: INTERNAL MEDICINE

## 2020-02-03 RX ORDER — SULFAMETHOXAZOLE AND TRIMETHOPRIM 800; 160 MG/1; MG/1
1 TABLET ORAL EVERY 12 HOURS
Status: DISCONTINUED | OUTPATIENT
Start: 2020-02-03 | End: 2020-02-05 | Stop reason: HOSPADM

## 2020-02-03 RX ADMIN — ZINC SULFATE 220 MG (50 MG) CAPSULE 220 MG: CAPSULE at 05:48

## 2020-02-03 RX ADMIN — SODIUM CHLORIDE 250 MG: 9 INJECTION, SOLUTION INTRAVENOUS at 06:31

## 2020-02-03 RX ADMIN — Medication 100 MG: at 05:49

## 2020-02-03 RX ADMIN — ACETAMINOPHEN 650 MG: 325 TABLET, FILM COATED ORAL at 21:24

## 2020-02-03 RX ADMIN — SENNOSIDES AND DOCUSATE SODIUM 2 TABLET: 8.6; 5 TABLET ORAL at 17:37

## 2020-02-03 RX ADMIN — VANCOMYCIN HYDROCHLORIDE 700 MG: 500 INJECTION, POWDER, LYOPHILIZED, FOR SOLUTION INTRAVENOUS at 09:16

## 2020-02-03 RX ADMIN — FOLIC ACID 1 MG: 1 TABLET ORAL at 05:49

## 2020-02-03 RX ADMIN — ENOXAPARIN SODIUM 40 MG: 100 INJECTION SUBCUTANEOUS at 21:15

## 2020-02-03 RX ADMIN — THERA TABS 1 TABLET: TAB at 05:49

## 2020-02-03 RX ADMIN — AMPICILLIN SODIUM AND SULBACTAM SODIUM 3 G: 2; 1 INJECTION, POWDER, FOR SOLUTION INTRAMUSCULAR; INTRAVENOUS at 05:46

## 2020-02-03 RX ADMIN — OXYCODONE HYDROCHLORIDE AND ACETAMINOPHEN 1000 MG: 500 TABLET ORAL at 05:49

## 2020-02-03 RX ADMIN — AMPICILLIN SODIUM AND SULBACTAM SODIUM 3 G: 2; 1 INJECTION, POWDER, FOR SOLUTION INTRAMUSCULAR; INTRAVENOUS at 00:30

## 2020-02-03 RX ADMIN — SULFAMETHOXAZOLE AND TRIMETHOPRIM 1 TABLET: 800; 160 TABLET ORAL at 11:46

## 2020-02-03 RX ADMIN — SULFAMETHOXAZOLE AND TRIMETHOPRIM 1 TABLET: 800; 160 TABLET ORAL at 17:37

## 2020-02-03 ASSESSMENT — ENCOUNTER SYMPTOMS
ABDOMINAL PAIN: 0
NERVOUS/ANXIOUS: 1
VOMITING: 0
DIAPHORESIS: 0
NECK PAIN: 0
COUGH: 0
DIARRHEA: 0
WEAKNESS: 0
HALLUCINATIONS: 0
BACK PAIN: 0
SHORTNESS OF BREATH: 0
STRIDOR: 0
FOCAL WEAKNESS: 0
CHILLS: 0
SPEECH CHANGE: 0
FEVER: 0
PALPITATIONS: 0

## 2020-02-03 ASSESSMENT — LIFESTYLE VARIABLES
ORIENTATION AND CLOUDING OF SENSORIUM: ORIENTED AND CAN DO SERIAL ADDITIONS
HEADACHE, FULLNESS IN HEAD: NOT PRESENT
AUDITORY DISTURBANCES: NOT PRESENT
VISUAL DISTURBANCES: NOT PRESENT
PAROXYSMAL SWEATS: NO SWEAT VISIBLE
SUBSTANCE_ABUSE: 1
AGITATION: SOMEWHAT MORE THAN NORMAL ACTIVITY
NAUSEA AND VOMITING: NO NAUSEA AND NO VOMITING
TREMOR: TREMOR NOT VISIBLE BUT CAN BE FELT, FINGERTIP TO FINGERTIP
TOTAL SCORE: 3
ANXIETY: MILDLY ANXIOUS

## 2020-02-03 NOTE — PROGRESS NOTES
"Pt is connected to IV ABX. Pt was in the bathroom then when he came out he put the iv pump and pole next to the bed and started grumbling, exasperated, seemed to say \"how am I supposed to...\"   Asked pt what he needed help with and pt was still upset and said \"I know what I have to do ok woman.\" Pt sat in bed and started hitting his head with both hands as fists then stopped.   "

## 2020-02-03 NOTE — PROGRESS NOTES
"Pharmacy Kinetics 64 y.o. male on vancomycin day # 3 2/3/2020    Currently on Vancomycin 700 mg iv q12hr (1100, 2300) - Dose reduction with re-timing     Provider specified end date: TBD, empiric end date 20 currently applied.      Indication for Treatment: SSTI     Pertinent history per medical record: Admitted on 2020 for Nonhealing left leg wound. 63 y/o. male with history of alcohol abuse,psychiatric problems. Patient has this wound for about 1 year, that started after injury from a fall. Started having drainage with malodorous smell several weeks ago. Has seen at wound clinic for this wound in the past, but he proved to be noncompliant with wound care follow-up.      Other antibiotics: Unasyn 3g iv q6hr     Allergies: Patient has no known allergies.      List concerns for renal function : Age       Pertinent cultures to date:   20:Lt Lg,WND: In process - Mixed skin lotus - Rare Gram positive cocci on gram stain.      MRSA nares swab if pneumonia is a concern (ordered/positive/negative/n-a): NA     Recent Labs     20  1302 20  0338   WBC 5.7 6.0   NEUTSPOLYS 43.10* 46.50     Recent Labs     20  1302 20  0338 20  1303   BUN 10 15 8   CREATININE 0.69 0.83 0.86   ALBUMIN 3.9  --   --      Recent Labs     20  2138   VANCOTROUGH 21.9*       Intake/Output Summary (Last 24 hours) at 2/3/2020 0807  Last data filed at 2/3/2020 0546  Gross per 24 hour   Intake 690 ml   Output --   Net 690 ml      /98   Pulse 78   Temp 36.5 °C (97.7 °F) (Temporal)   Resp 18   Ht 1.778 m (5' 10\")   Wt 68.7 kg (151 lb 7.3 oz)   SpO2 94%  Temp (24hrs), Av.8 °C (98.2 °F), Min:36.5 °C (97.7 °F), Max:37.1 °C (98.7 °F)      A/P   1. Vancomycin dose change: Yes.   2. Next vancomycin level: ~ 2 - 3 days   3. Goal trough: 12 - 16 mcg/mL   Comments: Vancomycin trough level resulted last night at 21.9 mcg/mL, however, not true trough level. Lab was drawn at the correct time but dose " prior to level was given ~ 3 hours late. Estimated true trough level based on CrCl of 83.2 mL/min and Ke of 0.073 hr-1 is ~ 17.6 mcg/mL. Dose reduced from 1,000 mg (15 mg/kg) IV q12h to 700 mg (10 mg/kg) IV q12h.  Plastic surgery consulted - recommend wound care and close observation for now. NNL today. Ordered BMP for tomorrow morning to continue monitoring renal function while on Vancomycin. Afebrile, VS stable WNL.     Althea Riley, ChynaD, BCPS

## 2020-02-03 NOTE — DISCHARGE PLANNING
Anticipated Discharge Disposition: Shelter    Action: Pt discussed in IDT rounds. Pt has nonhealing leg wound. MD consulted with plastic surgery.     Barriers to Discharge: None    Plan: LSW continue to assess for discharge need.

## 2020-02-03 NOTE — PROGRESS NOTES
Hospital Medicine Daily Progress Note    Date of Service  2/3/2020    Chief Complaint  64 y.o. male admitted 1/31/2020 with non healing left leg wound and foul- smelling discharge.     Hospital Course    History of restless leg syndrome, alcohol abuse, psychiatric disorder, tobacco use, necrotizing fasciitis,  w nonhealing left leg wound for 1 year plus following injury with fall admitted with signs of infection wound infection with malodorous smell and drainage over several weeks.  Patient initiated on antibiotics, wound care.   plastic surgery was consulted from ER to consult.    Interval Problem Update    Patient remains afebrile.  Wound cultures negative growth to date.  Jimena.  I discussed case with plastic surgery, Dr. Coyd-recommends to continue with wound care as although wound is long, it is also narrow and has potential to de-epithelialize and heal.  She will reassess patient later in the week.   Consultants/Specialty    Plastic surgery - Dr. Cody    Code Status    Full code    Disposition    Patient homeless, to be determined    Review of Systems  Review of Systems   Constitutional: Negative for chills, diaphoresis and fever.   HENT: Negative for congestion.    Respiratory: Negative for cough, shortness of breath and stridor.    Cardiovascular: Negative for chest pain, palpitations and leg swelling.   Gastrointestinal: Negative for abdominal pain, diarrhea and vomiting.   Musculoskeletal: Negative for back pain, joint pain and neck pain.        Left leg wounds   Neurological: Negative for speech change, focal weakness and weakness.   Psychiatric/Behavioral: Positive for substance abuse. Negative for hallucinations. The patient is nervous/anxious (improved. ).         Physical Exam  Temp:  [36.6 °C (97.9 °F)-37.1 °C (98.7 °F)] 37.1 °C (98.7 °F)  Pulse:  [72-90] 83  Resp:  [18] 18  BP: (139-159)/(83-97) 159/95  SpO2:  [95 %-96 %] 96 %    Physical Exam  Constitutional:       General: He is  not in acute distress.     Appearance: He is not diaphoretic.   HENT:      Head: Normocephalic and atraumatic.      Right Ear: External ear normal.      Left Ear: External ear normal.      Nose: Nose normal.   Eyes:      General: No scleral icterus.     Conjunctiva/sclera: Conjunctivae normal.      Pupils: Pupils are equal, round, and reactive to light.   Neck:      Musculoskeletal: Normal range of motion. No neck rigidity.   Cardiovascular:      Rate and Rhythm: Normal rate and regular rhythm.      Heart sounds: No murmur.   Pulmonary:      Breath sounds: No stridor. No wheezing, rhonchi or rales.   Abdominal:      General: There is no distension.      Palpations: Abdomen is soft.      Tenderness: There is no tenderness.   Musculoskeletal:         General: No swelling.      Comments: Left lower extremity diffusely swollen.  Large open wound- lower posterior thigh extending down medial and posterior calf.  Dressed.      Skin:     General: Skin is warm and dry.   Neurological:      General: No focal deficit present.      Mental Status: He is alert and oriented to person, place, and time.   Psychiatric:      Comments: Easily agitated otherwise cooperative currently         Fluids    Intake/Output Summary (Last 24 hours) at 2/3/2020 0754  Last data filed at 2/3/2020 0546  Gross per 24 hour   Intake 690 ml   Output --   Net 690 ml       Laboratory  Recent Labs     01/31/20  1302 02/01/20  0338   WBC 5.7 6.0   RBC 4.32* 3.86*   HEMOGLOBIN 11.7* 10.5*   HEMATOCRIT 37.5* 32.9*   MCV 86.8 85.2   MCH 27.1 27.2   MCHC 31.2* 31.9*   RDW 51.8* 49.8   PLATELETCT 535* 486*   MPV 8.7* 8.9*     Recent Labs     01/31/20  1302 02/01/20  0338 02/02/20  1303   SODIUM 139 136 136   POTASSIUM 4.1 3.8 3.9   CHLORIDE 106 102 101   CO2 18* 22 24   GLUCOSE 91 98 103*   BUN 10 15 8   CREATININE 0.69 0.83 0.86   CALCIUM 9.1 9.0 9.5                   Imaging  US-EXTREMITY VENOUS LOWER UNILAT LEFT   Final Result      IR-US GUIDED PIV   Final  Result    Ultrasound-guided PERIPHERAL IV INSERTION performed by    qualified nursing staff as above.                 Assessment/Plan  * Leg wound, left  Assessment & Plan  Chronic, nonhealing, with foul-smelling discharge, likely infected.  He is homelessness with alcoholism complicating treatment.  US no DVT.  MRSA per 5/19 wound culture that was resistant to doxycycline.  Cultures during this admission negative.  Will de-escalate antibiotics to Bactrim.  Plastic surgery has assessed patient and felt although wound is long, it is also narrow and has potential to de-epithelialize and heal.  Plan to continue wound care, nutrition support , assess compliance.  I discussed with plastics,  Dr. Cody, to reevaluate towards the end of the week to monitor progress.  He may need a skin flap but there is considerable concern for poor support and compliance issues        LFT elevation  Assessment & Plan  AST elevated likely secondary to alcohol abuse.  Continue to emphasize alcohol cessation     Anemia- (present on admission)  Assessment & Plan  Patient denies melena no vomiting with blood.  Iron deficient with low ferritin  IV iron replacement    Alcohol use- (present on admission)  Assessment & Plan  History of delirium tremens.  No withdrawal symptoms. Periods of agitaiton and anxiety . Alcohol level initially elevated.  No signs of withdrawal.  Continue with vitamin B1 supplementation.    Prn Ativan.      Tobacco use- (present on admission)  Assessment & Plan  Tobacco cessation counseling started.    Nicotine patch replacement.    Continue to emphasize stop smoking.           VTE prophylaxis: Lovenox

## 2020-02-03 NOTE — PROGRESS NOTES
"Pt has moments that he appears exasperated and he hits his head hard with his fists (does this continuously for about 6x), mumbles \"I just wanna get out of here. Give me a gun.\"  Have asked pt several times to stop hitting his head. Attempted to ask pt if he knew where he was and why he was here. Pt is able to say that he is in the hospital but does not know why. When asked why he wants a gun, pt said \"oh I don't know, forget it.\"  Pt seems to change mood, apologizes for \"grumpiniess\" and asks for coffee.   "

## 2020-02-03 NOTE — PROGRESS NOTES
Assumed care of pt at 0715. Report received and bedside rounding completed with night RN. Pt is calm no SOB, or in any acute distress noted.    Pt is not considered a fall risk. edu provided on risk level. Fall precautions in place,  bed alarm. - Treaded non slip socks. Bed locked. Communication board updated with POC. Call light and pt belongings within reach - pt makes needs known - hourly rounding in place. See flowsheets for further assessment.       This am pt walking around room and in hallway. Calm affect. Updated on poc today- verbalized understanding.

## 2020-02-03 NOTE — CONSULTS
DATE OF SERVICE:  02/02/2020    REASON FOR CONSULTATION:  Left leg wound.    CONSULTANT:  Estefanía Cody MD    BRIEF HISTORY OF PRESENTING ILLNESS:  This is a 64-year-old gentleman with   history of alcohol abuse, he is also a smoker, who has had a wound on his left   leg for at least a year.  This is a laceration likely from a fall.  He   developed some odor of the wound and was seen at a wound clinic where he was   not compliant with his wound care.  He was then referred to the ER for   evaluation and has been admitted for management of the wound.    PAST MEDICAL HISTORY:  He has a history of psychiatric illnesses and restless   leg syndrome.    PAST SURGICAL HISTORY:  ORIF of the right ankle.    SOCIAL HISTORY:  He is homeless, smokes 2-3 cigarettes a day for the past 40   years and also drinks daily.  Does not report drug use.    ALLERGIES:  No known drug allergies.    MEDICATIONS:  None.    PHYSICAL EXAMINATION:  He has a wound involving the medial and posterior   aspect of the left leg just at the level of the calf and popliteal fossa that   actually looks granulated and filled in.  There is some drainage, but nothing   purulent.  He has normal extension and flexion of the knee and foot, normal   sensation.  Wound measures approximately 10 cm in length with about 3-4 cm in   width.  There is minimal depth to the wound.    LABORATORY DATA:  WBC 5.7 on 01/31/2020.    IMAGING:  Venous Doppler of left lower extremity, no evidence of DVT.    ASSESSMENT:  Left lower extremity wound (large skin defect).    PLAN:  Skin grafting this area will be problematic given the patient's poor   compliance upon followup.  Instead of subjecting him to the risks or surgery,   I would implement wound care and watch him closely over the next week to see   how much contraction the wound undergoes.  The wound, although long, is   narrow, so deepithelialization may occur quickly.  Would continue dressing   changes as you are and  follow his wound in the next week to [facility].    Thank you for involving me in the care of this patient.  I will be following   his wound progress.       ____________________________________     SUHAS CARBONE MD LMT / BLANCA    DD:  02/02/2020 21:57:52  DT:  02/02/2020 23:01:11    D#:  [id number]  Job#:  [id number]

## 2020-02-03 NOTE — WOUND TEAM
[facility]  Inpatient Services  Initial Wound and Skin Care Evaluation    Admission Date: 1/31/2020     Consult Date: 1/31/2020 @ 9774 & 6350   HPI, PMH, SH: Reviewed    Unit where seen by Wound Team: [id number]     WOUND CONSULT RELATED TO:  LLE    Self Report / Pain Level:  C/o pain with cleaning 3/10      OBJECTIVE:  drsg with petrolatum gauze, roll gauze    WOUND TYPE, LOCATION, CHARACTERISTICS (Pressure Injuries: location, stage, POA or date identified)    Wound 01/31/20 Full Thickness Wound Leg Left (Active)     lateral     medial 2/1/2020  2:00 AM   Site Assessment Yellow;Pink    Meredith-wound Assessment Scar tissue;Edema    Margins Defined edges;Epibole (rolled edges)    Wound Length (cm) 31 cm 2/2/2020  2:00 PM   Wound Width (cm) 3 cm 2/2/2020  2:00 PM   Wound Depth (cm) 0.4 cm 2/2/2020  2:00 PM   Wound Surface Area (cm^2) 93 cm^2 2/2/2020  2:00 PM   Tunneling 0 cm 2/2/2020  2:00 PM   Undermining 0 cm 2/2/2020  2:00 PM   Closure Secondary intention    Drainage Amount Small    Drainage Description Yellow    Non-staged Wound Description Full thickness    Treatments Cleansed    Cleansing Normal Saline Irrigation    Periwound Protectant Not Applicable    Dressing Options Silver Powder;Gel Gauze;Island Dressing    Dressing Cleansing/Solutions Not Applicable    Dressing Changed Changed    Dressing Status Intact    Dressing Change Frequency Daily    NEXT Dressing Change  02/03/20    NEXT Weekly Photo (Inpatient Only) 02/05/20    Odor None     Pulses 1+;DP     Exposed Structures None     Tissue Type and Percentage 99% yellow, 1% pink edges      Vascular:  Wound not r/t vascular problem    Lab Values:    Lab Results   Component Value Date/Time    WBC 6.0 02/01/2020 03:38 AM    RBC 3.86 (L) 02/01/2020 03:38 AM    HEMOGLOBIN 10.5 (L) 02/01/2020 03:38 AM    HEMATOCRIT 32.9 (L) 02/01/2020 03:38 AM                Lab Results   Component Value Date/Time    HBA1C 5.7 (H) 11/09/2018 06:30 PM        Culture:   Was  "Obtained 2/1    INTERVENTIONS BY WOUND TEAM:  Removed drsg, cleaned with NS, dried. Silver powder to wound bed, covered with hydrogel gauze conformed to wound bed. Covered with 3 island drsgs, cut to conform to wound shape. Wrapped with elastic wrap.     Interdisciplinary consultation: Patient, Bedside RN    EVALUATION: Pt has full thickness wound that is \"U\" shaped and posterior to L knee. Wound edges with epibole. Wound base yellow. Silver powder for antimicrobial, gel gauze for moist wound healing and try to promote autolytic debridement.      Goals: Slow steady decrease in wound area and depth weekly. Needs edges made acute    NURSING PLAN OF CARE ORDERS (X):  Dressing changes: See Dressing Care orders:x  Skin care: See Skin Care orders:   Rectal tube care: See Rectal Tube Care orders:        Other orders:                           RSKIN:   CURRENTLY IN PLACE (X), APPLIED THIS VISIT (A), ORDERED (O):   Q shift [place]:  X  Q shift pressure point assessments:  X  Pressure redistribution mattress  x                           Low Airloss                        Bariatric NANCY                     Bariatric foam                        Heel float boots                     Heel Silicone dressing                         Float Heels off Bed with Pillows               Barrier wipes         Barrier Cream         Barrier paste          Sacral silicone dressing         Silicone O2 tubing         Anchorfast         Cannula fixation Device (Tender )          Gray Foam Ear protectors           Trach with Optifoam split foam                 Waffle cushion        Waffle Overlay         Rectal tube or BMS    Purwick/Condom Cath          Antifungal tx      Interdry          Reposition q 2 hours   self     Up to chair        Ambulate   x   PT/OT        Dietician        Diabetes Education      PO  x   TF     TPN     NPO   # days   Other        WOUND TEAM PLAN OF CARE (X):   Dressing changes by wound team:          Follow up 1-2 " times weekly:               Follow up 3 times weekly:                NPWT change 3 times weekly:     Follow up as needed:       Other (explain):     Anticipated discharge plans (X):   LTACH:        SNF/Rehab:                  Home Care:           Outpatient Wound Center:            Self Care:            Other: needs wound care LLE

## 2020-02-03 NOTE — CARE PLAN
Problem: Communication  Goal: The ability to communicate needs accurately and effectively will improve  Outcome: PROGRESSING SLOWER THAN EXPECTED     Problem: Safety  Goal: Will remain free from injury  Outcome: PROGRESSING SLOWER THAN EXPECTED  Goal: Will remain free from falls  Outcome: PROGRESSING SLOWER THAN EXPECTED     Problem: Infection  Goal: Will remain free from infection  Outcome: PROGRESSING SLOWER THAN EXPECTED     Problem: Venous Thromboembolism (VTW)/Deep Vein Thrombosis (DVT) Prevention:  Goal: Patient will participate in Venous Thrombosis (VTE)/Deep Vein Thrombosis (DVT)Prevention Measures  Outcome: PROGRESSING SLOWER THAN EXPECTED     Problem: Bowel/Gastric:  Goal: Normal bowel function is maintained or improved  Outcome: PROGRESSING SLOWER THAN EXPECTED  Goal: Will not experience complications related to bowel motility  Outcome: PROGRESSING SLOWER THAN EXPECTED     Problem: Knowledge Deficit  Goal: Knowledge of disease process/condition, treatment plan, diagnostic tests, and medications will improve  Outcome: PROGRESSING SLOWER THAN EXPECTED  Goal: Knowledge of the prescribed therapeutic regimen will improve  Outcome: PROGRESSING SLOWER THAN EXPECTED     Problem: Discharge Barriers/Planning  Goal: Patient's continuum of care needs will be met  Outcome: PROGRESSING SLOWER THAN EXPECTED     Problem: Pain Management  Goal: Pain level will decrease to patient's comfort goal  Outcome: PROGRESSING SLOWER THAN EXPECTED     Problem: Psychosocial Needs:  Goal: Level of anxiety will decrease  Outcome: PROGRESSING SLOWER THAN EXPECTED

## 2020-02-03 NOTE — CARE PLAN
Problem: Venous Thromboembolism (VTW)/Deep Vein Thrombosis (DVT) Prevention:  Goal: Patient will participate in Venous Thrombosis (VTE)/Deep Vein Thrombosis (DVT)Prevention Measures  Intervention: Assess and monitor for anticoagulation complications  Note:   Pt receives Lovenox subcutaneous for VTE prophylaxis.      Problem: Psychosocial Needs:  Goal: Level of anxiety will decrease  Note:   Pt can be labile, when appears stressed or overwhelmed he hits himself with his fists. Have tried to provide therapeutic talk to pt and mitigate stress but pt is difficult to calm down.

## 2020-02-03 NOTE — PROGRESS NOTES
Rec'd report from day shift RN. Assumed pt care. Assessment completed. AA&OX3, reoriented to place. Pt is very forgetful, reoriented various time, pt is easy to re-direct. Denies pain at this time. No s/s of discomfort or distress. Pt is up self, ambulates around the unit and maintains steady gait. Dressing to left leg is CDI. Bed in lowest position, bed locked, treaded socks in place, RN and CNA numbers provided, call light within reach.

## 2020-02-04 LAB
ANION GAP SERPL CALC-SCNC: 10 MMOL/L (ref 0–11.9)
BUN SERPL-MCNC: 8 MG/DL (ref 8–22)
CALCIUM SERPL-MCNC: 9.1 MG/DL (ref 8.5–10.5)
CHLORIDE SERPL-SCNC: 105 MMOL/L (ref 96–112)
CO2 SERPL-SCNC: 24 MMOL/L (ref 20–33)
CREAT SERPL-MCNC: 1.06 MG/DL (ref 0.5–1.4)
GLUCOSE SERPL-MCNC: 94 MG/DL (ref 65–99)
POTASSIUM SERPL-SCNC: 3.6 MMOL/L (ref 3.6–5.5)
SODIUM SERPL-SCNC: 139 MMOL/L (ref 135–145)

## 2020-02-04 PROCEDURE — 700102 HCHG RX REV CODE 250 W/ 637 OVERRIDE(OP): Performed by: HOSPITALIST

## 2020-02-04 PROCEDURE — A9270 NON-COVERED ITEM OR SERVICE: HCPCS | Performed by: INTERNAL MEDICINE

## 2020-02-04 PROCEDURE — 700111 HCHG RX REV CODE 636 W/ 250 OVERRIDE (IP): Performed by: INTERNAL MEDICINE

## 2020-02-04 PROCEDURE — 700105 HCHG RX REV CODE 258: Performed by: HOSPITALIST

## 2020-02-04 PROCEDURE — 99232 SBSQ HOSP IP/OBS MODERATE 35: CPT | Performed by: HOSPITALIST

## 2020-02-04 PROCEDURE — 770006 HCHG ROOM/CARE - MED/SURG/GYN SEMI*

## 2020-02-04 PROCEDURE — 700111 HCHG RX REV CODE 636 W/ 250 OVERRIDE (IP): Performed by: HOSPITALIST

## 2020-02-04 PROCEDURE — 700102 HCHG RX REV CODE 250 W/ 637 OVERRIDE(OP): Performed by: INTERNAL MEDICINE

## 2020-02-04 PROCEDURE — A9270 NON-COVERED ITEM OR SERVICE: HCPCS | Performed by: HOSPITALIST

## 2020-02-04 PROCEDURE — 36415 COLL VENOUS BLD VENIPUNCTURE: CPT

## 2020-02-04 PROCEDURE — 80048 BASIC METABOLIC PNL TOTAL CA: CPT

## 2020-02-04 PROCEDURE — 302015 SILVER POWDER: Performed by: HOSPITALIST

## 2020-02-04 RX ADMIN — FOLIC ACID 1 MG: 1 TABLET ORAL at 05:13

## 2020-02-04 RX ADMIN — ZINC SULFATE 220 MG (50 MG) CAPSULE 220 MG: CAPSULE at 05:13

## 2020-02-04 RX ADMIN — Medication 100 MG: at 05:13

## 2020-02-04 RX ADMIN — ENOXAPARIN SODIUM 40 MG: 100 INJECTION SUBCUTANEOUS at 21:18

## 2020-02-04 RX ADMIN — SULFAMETHOXAZOLE AND TRIMETHOPRIM 1 TABLET: 800; 160 TABLET ORAL at 05:13

## 2020-02-04 RX ADMIN — SULFAMETHOXAZOLE AND TRIMETHOPRIM 1 TABLET: 800; 160 TABLET ORAL at 17:48

## 2020-02-04 RX ADMIN — THERA TABS 1 TABLET: TAB at 05:13

## 2020-02-04 RX ADMIN — ACETAMINOPHEN 650 MG: 325 TABLET, FILM COATED ORAL at 21:19

## 2020-02-04 RX ADMIN — OXYCODONE HYDROCHLORIDE AND ACETAMINOPHEN 1000 MG: 500 TABLET ORAL at 05:13

## 2020-02-04 RX ADMIN — SODIUM CHLORIDE 250 MG: 9 INJECTION, SOLUTION INTRAVENOUS at 06:34

## 2020-02-04 ASSESSMENT — LIFESTYLE VARIABLES
HEADACHE, FULLNESS IN HEAD: NOT PRESENT
NAUSEA AND VOMITING: NO NAUSEA AND NO VOMITING
TREMOR: TREMOR NOT VISIBLE BUT CAN BE FELT, FINGERTIP TO FINGERTIP
TOTAL SCORE: 2
AUDITORY DISTURBANCES: NOT PRESENT
SUBSTANCE_ABUSE: 1
ORIENTATION AND CLOUDING OF SENSORIUM: ORIENTED AND CAN DO SERIAL ADDITIONS
PAROXYSMAL SWEATS: NO SWEAT VISIBLE
AGITATION: NORMAL ACTIVITY
ANXIETY: MILDLY ANXIOUS
VISUAL DISTURBANCES: NOT PRESENT

## 2020-02-04 ASSESSMENT — ENCOUNTER SYMPTOMS
FOCAL WEAKNESS: 0
NECK PAIN: 0
FEVER: 0
SPEECH CHANGE: 0
COUGH: 0
CHILLS: 0
DIARRHEA: 0
PALPITATIONS: 0
BACK PAIN: 0
DIAPHORESIS: 0
ABDOMINAL PAIN: 0
VOMITING: 0
SHORTNESS OF BREATH: 0
NERVOUS/ANXIOUS: 1
WEAKNESS: 0
HALLUCINATIONS: 0
STRIDOR: 0

## 2020-02-04 NOTE — PROGRESS NOTES
"/92   Pulse 77   Temp 36.6 °C (97.9 °F) (Temporal)   Resp 18   Ht 1.778 m (5' 10\")   Wt 68.7 kg (151 lb 7.3 oz)   SpO2 95%   BMI 21.73 kg/m²   Received report and assumed care of pt at 1900. Pt is A&O x3. Pt is disoriented to time. Dressing change done by previous shift at 1800. POC discussed and updated. Bed is in lowest position and call light is with in reach. Hourly rounding is in place.   "

## 2020-02-04 NOTE — PROGRESS NOTES
Hospital Medicine Daily Progress Note    Date of Service  2/4/2020    Chief Complaint  64 y.o. male admitted 1/31/2020 with non healing left leg wound and foul- smelling discharge.     Hospital Course    History of restless leg syndrome, alcohol abuse, psychiatric disorder, tobacco use, necrotizing fasciitis,  w nonhealing left leg wound for 1 year plus following injury with fall admitted with signs of infection wound infection with malodorous smell and drainage over several weeks.  Patient initiated on antibiotics, wound care.   plastic surgery was consulted from ER to consult.    Interval Problem Update  2/4: Patient seen and examined by me today.  He had no new complaints states that he wants to go home. I would have to get final clearance from plastic surgery.  Although the patient's has poor compliance and follow-up with wound care with unlikely help.    Consultants/Specialty    Plastic surgery - Dr. Cody    Code Status    Full code    Disposition    Patient homeless, to be determined    Review of Systems  Review of Systems   Constitutional: Negative for chills, diaphoresis and fever.   HENT: Negative for congestion.    Respiratory: Negative for cough, shortness of breath and stridor.    Cardiovascular: Negative for chest pain, palpitations and leg swelling.   Gastrointestinal: Negative for abdominal pain, diarrhea and vomiting.   Musculoskeletal: Negative for back pain, joint pain and neck pain.        Left leg wounds   Neurological: Negative for speech change, focal weakness and weakness.   Psychiatric/Behavioral: Positive for substance abuse. Negative for hallucinations. The patient is nervous/anxious (improved. ).         Physical Exam  Temp:  [36.5 °C (97.7 °F)-37.4 °C (99.3 °F)] 37.4 °C (99.3 °F)  Pulse:  [77-97] 84  Resp:  [16-18] 16  BP: (130-140)/(86-94) 130/94  SpO2:  [95 %] 95 %    Physical Exam  Constitutional:       General: He is not in acute distress.     Appearance: He is not  diaphoretic.   HENT:      Head: Normocephalic and atraumatic.      Right Ear: External ear normal.      Left Ear: External ear normal.      Nose: Nose normal.   Eyes:      General: No scleral icterus.     Conjunctiva/sclera: Conjunctivae normal.      Pupils: Pupils are equal, round, and reactive to light.   Neck:      Musculoskeletal: Normal range of motion. No neck rigidity.   Cardiovascular:      Rate and Rhythm: Normal rate and regular rhythm.      Heart sounds: No murmur.   Pulmonary:      Breath sounds: No stridor. No wheezing, rhonchi or rales.   Abdominal:      General: There is no distension.      Palpations: Abdomen is soft.      Tenderness: There is no tenderness.   Musculoskeletal:         General: No swelling.      Comments: Left lower extremity diffusely swollen.  Large open wound- lower posterior thigh extending down medial and posterior calf.  Dressed.      Skin:     General: Skin is warm and dry.   Neurological:      General: No focal deficit present.      Mental Status: He is alert and oriented to person, place, and time.   Psychiatric:      Comments: Easily agitated otherwise cooperative currently         Fluids    Intake/Output Summary (Last 24 hours) at 2/4/2020 1510  Last data filed at 2/4/2020 1402  Gross per 24 hour   Intake 1080 ml   Output --   Net 1080 ml       Laboratory      Recent Labs     02/02/20  1303 02/04/20  0327   SODIUM 136 139   POTASSIUM 3.9 3.6   CHLORIDE 101 105   CO2 24 24   GLUCOSE 103* 94   BUN 8 8   CREATININE 0.86 1.06   CALCIUM 9.5 9.1                   Imaging  US-EXTREMITY VENOUS LOWER UNILAT LEFT   Final Result      IR-US GUIDED PIV   Final Result    Ultrasound-guided PERIPHERAL IV INSERTION performed by    qualified nursing staff as above.                 Assessment/Plan  * Leg wound, left  Assessment & Plan  Chronic, nonhealing, with foul-smelling discharge, likely infected.  He is homelessness with alcoholism complicating treatment.  US no DVT.  MRSA per 5/19  wound culture that was resistant to doxycycline.  Cultures during this admission negative.  Will de-escalate antibiotics to Bactrim.  Plastic surgery has assessed patient and felt although wound is long, it is also narrow and has potential to de-epithelialize and heal.  Plan to continue wound care, nutrition support , assess compliance.  I discussed with plastics,  Dr. Cody, to reevaluate towards the end of the week to monitor progress.  He may need a skin flap but there is considerable concern for poor support and compliance issues        LFT elevation  Assessment & Plan  AST elevated likely secondary to alcohol abuse.  Continue to emphasize alcohol cessation     Anemia- (present on admission)  Assessment & Plan  Patient denies melena no vomiting with blood.  Iron deficient with low ferritin  IV iron replacement    Alcohol use- (present on admission)  Assessment & Plan  History of delirium tremens.  No withdrawal symptoms. Periods of agitaiton and anxiety . Alcohol level initially elevated.  No signs of withdrawal.  Continue with vitamin B1 supplementation.    Prn Ativan.      Tobacco use- (present on admission)  Assessment & Plan  Tobacco cessation counseling started.    Nicotine patch replacement.    Continue to emphasize stop smoking.           VTE prophylaxis: Lovenox

## 2020-02-04 NOTE — CARE PLAN
Problem: Safety  Goal: Will remain free from falls  Outcome: PROGRESSING AS EXPECTED  Intervention: Implement fall precautions  Flowsheets  Taken 2/3/2020 2115  Environmental Precautions: Treaded Slipper Socks on Patient;Personal Belongings, Wastebasket, Call Bell etc. in Easy Reach;Transferred to Stronger Side;Report Given to Other Health Care Providers Regarding Fall Risk;Bed in Low Position;Communication Sign for Patients & Families;Mobility Assessed & Appropriate Sign Placed  Taken 2/4/2020 0049  Chair/Bed Strip Alarm: Yes - Alarm On     Problem: Infection  Goal: Will remain free from infection  Outcome: PROGRESSING AS EXPECTED  Intervention: Assess signs and symptoms of infection  Note:   Vital signs q 8 hours. Pt is afebrile.

## 2020-02-04 NOTE — PROGRESS NOTES
"Received bedside report from Night RN. Awake and alert to name, place and year. IVF saline locked. He ambulates frequently. /94   Pulse 84   Temp 37.4 °C (99.3 °F) (Temporal)   Resp 16   Ht 1.778 m (5' 10\")   Wt 68.7 kg (151 lb 7.3 oz)   SpO2 95%   BMI 21.73 kg/m²     "

## 2020-02-05 VITALS
HEIGHT: 70 IN | WEIGHT: 151.46 LBS | TEMPERATURE: 98.7 F | HEART RATE: 83 BPM | OXYGEN SATURATION: 95 % | RESPIRATION RATE: 16 BRPM | DIASTOLIC BLOOD PRESSURE: 80 MMHG | BODY MASS INDEX: 21.68 KG/M2 | SYSTOLIC BLOOD PRESSURE: 117 MMHG

## 2020-02-05 PROCEDURE — 99239 HOSP IP/OBS DSCHRG MGMT >30: CPT | Performed by: HOSPITALIST

## 2020-02-05 PROCEDURE — 700102 HCHG RX REV CODE 250 W/ 637 OVERRIDE(OP): Performed by: HOSPITALIST

## 2020-02-05 PROCEDURE — A9270 NON-COVERED ITEM OR SERVICE: HCPCS | Performed by: HOSPITALIST

## 2020-02-05 RX ORDER — LANOLIN ALCOHOL/MO/W.PET/CERES
100 CREAM (GRAM) TOPICAL DAILY
Qty: 30 TAB | Refills: 3 | Status: SHIPPED
Start: 2020-02-06 | End: 2020-04-01

## 2020-02-05 RX ORDER — SULFAMETHOXAZOLE AND TRIMETHOPRIM 800; 160 MG/1; MG/1
1 TABLET ORAL EVERY 12 HOURS
Qty: 2 TAB | Refills: 0 | Status: SHIPPED | OUTPATIENT
Start: 2020-02-05 | End: 2020-02-06

## 2020-02-05 RX ORDER — ZINC SULFATE 50(220)MG
220 CAPSULE ORAL DAILY
Qty: 30 CAP | Refills: 3 | Status: SHIPPED
Start: 2020-02-06 | End: 2020-04-01

## 2020-02-05 RX ORDER — FERROUS SULFATE 325(65) MG
325 TABLET ORAL DAILY
Qty: 30 TAB | Refills: 3 | Status: SHIPPED
Start: 2020-02-05 | End: 2020-04-01

## 2020-02-05 RX ADMIN — FOLIC ACID 1 MG: 1 TABLET ORAL at 05:50

## 2020-02-05 RX ADMIN — THERA TABS 1 TABLET: TAB at 05:50

## 2020-02-05 RX ADMIN — OXYCODONE HYDROCHLORIDE AND ACETAMINOPHEN 1000 MG: 500 TABLET ORAL at 05:50

## 2020-02-05 RX ADMIN — ZINC SULFATE 220 MG (50 MG) CAPSULE 220 MG: CAPSULE at 05:50

## 2020-02-05 RX ADMIN — SULFAMETHOXAZOLE AND TRIMETHOPRIM 1 TABLET: 800; 160 TABLET ORAL at 05:51

## 2020-02-05 RX ADMIN — Medication 100 MG: at 05:50

## 2020-02-05 NOTE — DISCHARGE SUMMARY
Discharge Summary    CHIEF COMPLAINT ON ADMISSION  Chief Complaint   Patient presents with   • Wound Infection   • Leg Pain       Reason for Admission  Leg Pain     Admission Date  1/31/2020    CODE STATUS  Prior    HPI & HOSPITAL COURSE  This is a homeless 64 y.o. male here with history of alcohol abuse, restless leg syndrome, psychiatric problems, smoking, nonhealing left leg wound, who presented 1/31/2020 with nonhealing left leg wound for evaluation.  Patient has this wound for about 1 year, that started after injury from a fall.  Patient started having drainage with malodorous smell several weeks ago.  She denies much pain in the left leg.  He denies chills or fever. Patient was seen at wound clinic for this wound in the past, but he proved to be noncompliant with wound care follow-up.  Upon arrival to the ER his vital signs were within normal limits.  WBC count 5.7, hemoglobin 9.7, AST 53, ALT 48.  Patient was started on empiric antibiotic with IV Unasyn and vancomycin.  He was found to be severely iron deficient anemia without evidence of melena or GI bleed.  He was started on IV iron transfusions.  Patient was also started on CIWA protocol but did not require Ativan in hospital.  Patient's wound cultures were negative and had no signs of infections or drainage and subsequently IV antibiotics were discontinued.  Plastic surgery evaluated patient and determined that the wound is narrow and recommended wound care closely.  Patient was discharged with wound care follow-up which was explained to him in detail.       Therefore, he is discharged in fair and stable condition to home with close outpatient follow-up.    The patient met 2-midnight criteria for an inpatient stay at the time of discharge.    Discharge Date  2/5/2020    FOLLOW UP ITEMS POST DISCHARGE  Follow-up with wound care  Follow-up with plastic surgery    DISCHARGE DIAGNOSES  Principal Problem:    Leg wound, left POA: Unknown  Active Problems:     LFT elevation POA: Unknown    Alcohol use POA: Yes    Anemia POA: Yes    Tobacco use POA: Yes  Resolved Problems:    * No resolved hospital problems. *      FOLLOW UP  No future appointments.  [facility]  [address]  [place] [id number] [phone]  Schedule an appointment as soon as possible for a visit today  For wound re-check      MEDICATIONS ON DISCHARGE     Medication List      START taking these medications      Instructions   ascorbic acid 1000 MG tablet  Start taking on:  February 6, 2020  Commonly known as:  VITAMIN C   Take 1 Tab by mouth every day.  Dose:  1,000 mg     ferrous sulfate 325 (65 Fe) MG tablet   Take 1 Tab by mouth every day.  Dose:  325 mg     multivitamin Tabs  Start taking on:  February 6, 2020   Take 1 Tab by mouth every day.  Dose:  1 Tab     sulfamethoxazole-trimethoprim 800-160 MG tablet  Commonly known as:  BACTRIM DS   Take 1 Tab by mouth every 12 hours for 1 day.  Dose:  1 Tab     thiamine 100 MG tablet  Start taking on:  February 6, 2020  Commonly known as:  THIAMINE   Take 1 Tab by mouth every day.  Dose:  100 mg     zinc sulfate 220 (50 Zn) MG Caps  Start taking on:  February 6, 2020  Commonly known as:  ZINCATE   Take 1 Cap by mouth every day.  Dose:  220 mg            Allergies  No Known Allergies    DIET  No orders of the defined types were placed in this encounter.      ACTIVITY  As tolerated.  Weight bearing as tolerated    CONSULTATIONS  Plastic surgery    PROCEDURES  None    LABORATORY  Lab Results   Component Value Date    SODIUM 139 02/04/2020    POTASSIUM 3.6 02/04/2020    CHLORIDE 105 02/04/2020    CO2 24 02/04/2020    GLUCOSE 94 02/04/2020    BUN 8 02/04/2020    CREATININE 1.06 02/04/2020        Lab Results   Component Value Date    WBC 6.0 02/01/2020    HEMOGLOBIN 10.5 (L) 02/01/2020    HEMATOCRIT 32.9 (L) 02/01/2020    PLATELETCT 486 (H) 02/01/2020        Total time of the discharge process exceeds 50 minutes.

## 2020-02-05 NOTE — CARE PLAN
Problem: Safety  Goal: Will remain free from falls  Outcome: PROGRESSING AS EXPECTED  Note:   Pt will remain free from falls during stay, pt is encouraged to call for assistance. Pt is checked on frequently and is able to make needs known verbally. Pt ambulates around unit frequently, supervised.      Problem: Venous Thromboembolism (VTW)/Deep Vein Thrombosis (DVT) Prevention:  Goal: Patient will participate in Venous Thrombosis (VTE)/Deep Vein Thrombosis (DVT)Prevention Measures  Outcome: PROGRESSING AS EXPECTED  Note:   Pt will remain free from DVT during stay, pt ambulates frequently and is administered Lovenox for VTE prophylaxis.

## 2020-02-05 NOTE — PROGRESS NOTES
Report received from EKATERINA Fragoso. Assumed care at 1900, assessment complete. Pt is A & O x 3, disoriented to time, repetitive at times.  Pt c/o 6/10 LLE pain, pain meds given see MAR. Fall precautions and appropriate signs in place. Pt oriented to unit routine, call light/phone system and RN extension number provided. Pt educated regarding fall precautions. Bed alarm not in use, not needed. Pt denies any additional needs at this time. Call light within reach.   Pt is able to make needs known, up self and ambulates around unit. Pt given TV dinner and water.

## 2020-02-05 NOTE — DISCHARGE PLANNING
Anticipated Discharge Disposition: Shelter    Action: Discussed pt with MD. Per MD, pt cleared to discharge with wound clinic. Referral placed. LSW added wound clinic information to AVS.     LSW discussed discharge with bedside RN. Per RN, pt does not have any transportation back to shelter or to  medications. Pt has [place] [employer] only. LSW provided bedside RN with bus pass.    Barriers to Discharge: None    Plan: No additional discharge needs at this time.

## 2020-02-05 NOTE — DISCHARGE INSTRUCTIONS
Discharge Instructions    Discharged to other by car with self. Discharged via walking, hospital escort: Refused.  Special equipment needed: Not Applicable    Be sure to schedule a follow-up appointment with your primary care doctor or any specialists as instructed.     Discharge Plan:   Diet Plan: Discussed  Activity Level: Discussed  Smoking Cessation Offered: Patient Counseled  Confirmed Follow up Appointment: Patient to Call and Schedule Appointment  Confirmed Symptoms Management: Discussed  Medication Reconciliation Updated: Yes  Influenza Vaccine Indication: Patient Refuses    I understand that a diet low in cholesterol, fat, and sodium is recommended for good health. Unless I have been given specific instructions below for another diet, I accept this instruction as my diet prescription.   Other diet: Reg    Special Instructions: None    · Is patient discharged on Warfarin / Coumadin?   No     Depression / Suicide Risk    As you are discharged from this [facility] facility, it is important to learn how to keep safe from harming yourself.    Recognize the warning signs:  · Abrupt changes in personality, positive or negative- including increase in energy   · Giving away possessions  · Change in eating patterns- significant weight changes-  positive or negative  · Change in sleeping patterns- unable to sleep or sleeping all the time   · Unwillingness or inability to communicate  · Depression  · Unusual sadness, discouragement and loneliness  · Talk of wanting to die  · Neglect of personal appearance   · Rebelliousness- reckless behavior  · Withdrawal from people/activities they love  · Confusion- inability to concentrate     If you or a loved one observes any of these behaviors or has concerns about self-harm, here's what you can do:  · Talk about it- your feelings and reasons for harming yourself  · Remove any means that you might use to hurt yourself (examples: pills, rope, extension cords, firearm)  · Get  professional help from the community (Mental Health, Substance Abuse, psychological counseling)  · Do not be alone:Call your Safe Contact- someone whom you trust who will be there for you.  · Call your local CRISIS HOTLINE 439-1877 or 288-326-4784  · Call your local Children's Mobile Crisis Response Team [place] (263) 107-7507 or www.NewAer  · Call the toll free National Suicide Prevention Hotlines   · National Suicide Prevention Lifeline 685-440-IFAG (0426)  · National Hope Line Network 800-SUICIDE (827-9922)

## 2020-02-08 ENCOUNTER — HOSPITAL ENCOUNTER (EMERGENCY)
Facility: MEDICAL CENTER | Age: 65
End: 2020-02-08
Attending: EMERGENCY MEDICINE
Payer: MEDICARE

## 2020-02-08 VITALS
WEIGHT: 148.59 LBS | HEIGHT: 71 IN | TEMPERATURE: 98 F | SYSTOLIC BLOOD PRESSURE: 138 MMHG | DIASTOLIC BLOOD PRESSURE: 88 MMHG | HEART RATE: 78 BPM | BODY MASS INDEX: 20.8 KG/M2 | RESPIRATION RATE: 18 BRPM | OXYGEN SATURATION: 98 %

## 2020-02-08 DIAGNOSIS — L97.929 CHRONIC ULCER OF LOWER EXTREMITY, LEFT, WITH UNSPECIFIED SEVERITY (HCC): ICD-10-CM

## 2020-02-08 PROCEDURE — 99283 EMERGENCY DEPT VISIT LOW MDM: CPT

## 2020-02-09 NOTE — DISCHARGE INSTRUCTIONS
You were discharged from this hospital 3 days ago for the same wound.  All of your prescriptions were sent to the [facility] pharmacy on [address].  This is also where you can see a primary care doctor.  You will need their help, and help from the wound care center for this leg ulcer.  Please use the address below for the wound care center to establish a new visit, and also an appointment for primary care at the [facility].  It is only about 2 blocks away from here.    Wound Care Center  [address] [address]  [phone]

## 2020-02-09 NOTE — ED TRIAGE NOTES
"Chief Complaint   Patient presents with   • Leg Pain     Pt was bib ems for L leg wound for the last year. Pt was arrested and [facility] refused him due to leg wound, brought here for evaluation. Pt had an antibiotic script, but lost it.    • Cough     pt informs he has had a cough for a few days, non productive. Pt denies cp, sob, fever/chills       /92   Pulse 85   Temp 36.9 °C (98.4 °F) (Temporal)   Resp 16   Ht 1.803 m (5' 11\")   Wt 67.4 kg (148 lb 9.4 oz)   SpO2 98%   BMI 20.72 kg/m²      Pt's L leg wound is around his knee, that is about 8 inches in length, with healing tissue surrounding it.     Pt Informed regarding triage process and verbalized understanding to inform triage tech or RN for any changes in condition.  Placed in lobby.    "

## 2020-02-09 NOTE — ED PROVIDER NOTES
ED Provider Note    Scribed for Marcio Pinto M.D. by Marcio Pinto M.D.. 2/8/2020  9:59 PM    CHIEF COMPLAINT  Chief Complaint   Patient presents with   • Leg Pain     Pt was bib ems for L leg wound for the last year. Pt was arrested and skilled nursing refused him due to leg wound, brought here for evaluation. Pt had an antibiotic script, but lost it.    • Cough     pt informs he has had a cough for a few days, non productive. Pt denies cp, sob, fever/chills       HPI  Bola Varela is a homeless alcoholic smoker 64 y.o. male who presents to the Emergency Room after being refused at skilled nursing because of a chronic leg wound.  He had no intention of seeking medical care tonight.  He reports he thinks he had an antibiotic prescription but lost it.  He was discharged from this hospital 3 days ago for the same wound, after consultation with plastic surgery.  Only wound care follow-up was recommended per the patient has been followed by the wound care clinic in the past, but has been noncompliant.  He incidentally complains of a cough, but denies fevers or chills or hemoptysis or any productive cough.  He reports that his leg always hurts, which makes it uncomfortable to walk, though he still does.  He is not wheelchair-bound.    REVIEW OF SYSTEMS  See HPI for further details.    PAST MEDICAL HISTORY   has a past medical history of Psychiatric problem, Restless leg, and Tobacco use.    SOCIAL HISTORY  Social History     Tobacco Use   • Smoking status: Light Tobacco Smoker     Packs/day: 0.00     Years: 40.00     Pack years: 0.00     Types: Cigarettes   • Smokeless tobacco: Never Used   • Tobacco comment: 1 ppd until few years ago   Substance and Sexual Activity   • Alcohol use: Yes     Frequency: 2-4 times a month     Drinks per session: 3 or 4     Binge frequency: Never     Comment: occasional    • Drug use: No     Types: Marijuana   • Sexual activity: Not Currently     Partners: Female       SURGICAL HISTORY   has a  "past surgical history that includes ankle orif (Right).    CURRENT MEDICATIONS  Home Medications     Reviewed by Clover Smith R.N. (Registered Nurse) on 02/08/20 at 2052  Med List Status: Complete   Medication Last Dose Status   ascorbic acid (VITAMIN C) 1000 MG tablet not taking Active   ferrous sulfate 325 (65 Fe) MG tablet not taking Active   multivitamin (THERAGRAN) Tab not taking Active   thiamine (THIAMINE) 100 MG tablet not taking Active   zinc sulfate (ZINCATE) 220 (50 Zn) MG Cap not taking Active                ALLERGIES  No Known Allergies    PHYSICAL EXAM  VITAL SIGNS: /92   Pulse 85   Temp 36.9 °C (98.4 °F) (Temporal)   Resp 16   Ht 1.803 m (5' 11\")   Wt 67.4 kg (148 lb 9.4 oz)   SpO2 98%   BMI 20.72 kg/m²   Pulse ox interpretation: I interpret this pulse ox as normal.  Constitutional: Alert in no apparent distress.  HENT: Normocephalic, Atraumatic, Bilateral external ears normal. Nose normal.   Eyes: Conjunctiva normal, non-icteric.   Heart: Regular rate and rythm, no murmurs.    Lungs: Clear to auscultation bilaterally.  Skin: Chronic wound to the inside of the left leg, at the site of old surgical scarring, extending above and below the knee.  There is no surrounding erythema.  The wound bed is dry with extensive crusting.  There is no asymmetry to skin color or leg circumference.  There is no edema.  Neurologic: Alert, Grossly non-focal.  Normal, stable, independent gait.  Psychiatric: Affect normal, Judgment normal, Mood normal, Appears appropriate and not intoxicated.     COURSE & MEDICAL DECISION MAKING  The patient's VS, Nurses notes reviewed. (See chart for details)    9:59 PM Patient seen and examined at bedside.  It was not his intention to seek medical care tonight.  He was brought in by police after being refused by the [facility] because of a chronic leg wound.  He was discharged from this hospital 3 days ago after admission for this chronic nonhealing wound, was evaluated by " plastic surgery, and only wound care follow-up was recommended.  There is no evidence that anything about this wound has changed.  There is no evidence of sepsis.  He will be discharged, referred back to wound care, and reminded of his discharge prescriptions.     The patient will return for new or worsening symptoms and is stable at the time of discharge.    The patient is referred to a primary physician for blood pressure management, diabetic screening, and for all other preventative health concerns.      DISPOSITION:  Patient will be discharged home in stable condition.    FOLLOW UP:  Wound Care Center  [address]  [facility] [address]  [phone]        LOCUST  [address]  [place] [address]    to see a primary care doctor and get your prescriptions.      OUTPATIENT MEDICATIONS:  New Prescriptions    No medications on file         FINAL IMPRESSION  1. Chronic ulcer of lower extremity, left, with unspecified severity (HCC)

## 2020-02-12 ENCOUNTER — HOSPITAL ENCOUNTER (EMERGENCY)
Facility: MEDICAL CENTER | Age: 65
End: 2020-02-13
Attending: EMERGENCY MEDICINE
Payer: MEDICARE

## 2020-02-12 DIAGNOSIS — S81.802A WOUND OF LEFT LOWER EXTREMITY, INITIAL ENCOUNTER: ICD-10-CM

## 2020-02-12 DIAGNOSIS — Z51.89 ENCOUNTER FOR WOUND RE-CHECK: ICD-10-CM

## 2020-02-12 PROCEDURE — 99284 EMERGENCY DEPT VISIT MOD MDM: CPT

## 2020-02-12 RX ORDER — ACETAMINOPHEN 325 MG/1
650 TABLET ORAL ONCE
Status: COMPLETED | OUTPATIENT
Start: 2020-02-13 | End: 2020-02-13

## 2020-02-12 RX ORDER — IBUPROFEN 600 MG/1
600 TABLET ORAL ONCE
Status: COMPLETED | OUTPATIENT
Start: 2020-02-13 | End: 2020-02-13

## 2020-02-13 VITALS
DIASTOLIC BLOOD PRESSURE: 51 MMHG | WEIGHT: 148 LBS | SYSTOLIC BLOOD PRESSURE: 108 MMHG | OXYGEN SATURATION: 94 % | TEMPERATURE: 98.6 F | BODY MASS INDEX: 21.19 KG/M2 | HEART RATE: 89 BPM | RESPIRATION RATE: 18 BRPM | HEIGHT: 70 IN

## 2020-02-13 PROCEDURE — A9270 NON-COVERED ITEM OR SERVICE: HCPCS | Performed by: EMERGENCY MEDICINE

## 2020-02-13 PROCEDURE — 700102 HCHG RX REV CODE 250 W/ 637 OVERRIDE(OP): Performed by: EMERGENCY MEDICINE

## 2020-02-13 RX ADMIN — IBUPROFEN 600 MG: 600 TABLET ORAL at 00:18

## 2020-02-13 RX ADMIN — ACETAMINOPHEN 650 MG: 325 TABLET, FILM COATED ORAL at 00:18

## 2020-02-13 NOTE — DISCHARGE PLANNING
Medical Social Work (Late Entry)    Referral: [facility]erral    Intervention: MSW spoke w/ ERP regarding placement options for pt w/ wound care needs. Pt is reported to be poorly compliant w/ wound care due to transportation needs and other challenges from homelessness. Pt requires close outpatient wound care at the W[facility] Care Center ([address]) and would benefit from temporary placement at [facility].       Plan: MSW completed [facility] referral and faxed completed referral and Facesheet to: [phone]. SW to follow up w/ referral and possible acceptance when they open later this morning.

## 2020-02-13 NOTE — ED PROVIDER NOTES
ED Provider Note    Patient received in signout from Dr. Davis at 6:37 AM    Briefly, pt is a 64-year-old male presenting with poorly healing wound.  He is awaiting wound care prior to discharge with [facility] later today    He has been comfortable and without complaint throughout my shift, signed out to Dr. Flores pending wound care and well care discharge

## 2020-02-13 NOTE — DISCHARGE INSTRUCTIONS
You were seen in the ER for a wound check.  Your wound appears to be healing well and it does not appear that you require antibiotics.  Because you do not have a fever and your heart rate are normal I do not feel that blood work is necessary today.  I have given you Tylenol and Motrin for your leg pain.  You did have a large work-up when you were recently admitted to the hospital.  I recommend Tylenol and/or ibuprofen as directed on the bottle for pain control.  You have been seen by our  and given outpatient resources.  I have given you the contact information for our outpatient wound care service, you will need to follow-up with them for continued wound care to ensure your wound heals.  You do not require admission to the hospital today but you can return to the ER at anytime with new or worsening symptoms.  Good luck, I hope you feel better.

## 2020-02-13 NOTE — ED TRIAGE NOTES
"Chief Complaint   Patient presents with   • Wound Check     chronic wound to LLE. seen for same 4 days ago.       Pt bib ems from shelter for chronic wound to LLE. Seen for same 4 days ago, denies receiving antibiotics.  Wound approx 5x2in, weeping heavily.  Pt ambulatory with steady gait.     Pt placed back in lobby.  Informed of triage process and wait times, thanked for patience.  Pt instructed to notify staff of any symptom changes.    /77   Pulse 86   Temp 37 °C (98.6 °F) (Temporal)   Resp 19   Ht 1.778 m (5' 10\") Comment: Simultaneous filing. User may not have seen previous data.  Wt 67.1 kg (148 lb)   SpO2 93%   BMI 21.24 kg/m²    "

## 2020-02-13 NOTE — ED NOTES
Pt ambulatory to restroom with steady gait. Attempted to take VS, pt continues to refuse. Discussed importance, pt verbalized understanding, continues to refuse VS.

## 2020-02-13 NOTE — ED PROVIDER NOTES
ED Provider Note    CHIEF COMPLAINT  Chief Complaint   Patient presents with   • Wound Check     chronic wound to LLE. seen for same 4 days ago.        HPI  Bola Varela is a 64 y.o. undomiciled male who presents with a chief complaint of left lower extremity wound check.  The patient reports that he has a chronic wound to his left lower extremity for which he was hospitalized several days ago.  He is not currently on antibiotics but called EMS tonight due to pain in the leg.  He has not tried any medication for his symptoms.  He does not have any fevers or chills.  He does smoke cigarettes and drinks alcohol occasionally when he can afford it.    REVIEW OF SYSTEMS  See HPI for further details.  Left leg pain and swelling.  All other systems are negative.     PAST MEDICAL HISTORY   has a past medical history of Psychiatric problem, Restless leg, and Tobacco use.    SOCIAL HISTORY  Social History     Tobacco Use   • Smoking status: Light Tobacco Smoker     Packs/day: 0.00     Years: 40.00     Pack years: 0.00     Types: Cigarettes   • Smokeless tobacco: Never Used   • Tobacco comment: 1 ppd until few years ago   Substance and Sexual Activity   • Alcohol use: Yes     Frequency: 2-4 times a month     Drinks per session: 3 or 4     Binge frequency: Never     Comment: occasional    • Drug use: No     Types: Marijuana   • Sexual activity: Not Currently     Partners: Female       SURGICAL HISTORY   has a past surgical history that includes ankle orif (Right).    CURRENT MEDICATIONS  Home Medications     Reviewed by Rubi Alvarez R.N. (Registered Nurse) on 02/12/20 at 2542  Med List Status: <None>   Medication Last Dose Status   ascorbic acid (VITAMIN C) 1000 MG tablet  Active   ferrous sulfate 325 (65 Fe) MG tablet  Active   multivitamin (THERAGRAN) Tab  Active   thiamine (THIAMINE) 100 MG tablet  Active   zinc sulfate (ZINCATE) 220 (50 Zn) MG Cap  Active                ALLERGIES  No Known Allergies    PHYSICAL  "EXAM  VITAL SIGNS: /77   Pulse 86   Temp 37 °C (98.6 °F) (Temporal)   Resp 19   Ht 1.778 m (5' 10\") Comment: Simultaneous filing. User may not have seen previous data.  Wt 67.1 kg (148 lb)   SpO2 93%   BMI 21.24 kg/m²    Pulse ox interpretation: I interpret this pulse ox as normal.  Constitutional: Alert in no apparent distress.  HENT: Normocephalic, atraumatic, bilateral external ears normal. Mucous membranes moist. Nose normal.   Eyes: Pupils are equal and reactive. Conjunctiva normal, non-icteric.   Heart: Regular rate and rythm, no murmurs.  2+ dorsalis pedis pulse on the left foot.  Lungs: Clear to auscultation bilaterally.  Skin: Gaping wound in the left leg that extends to the left thigh.  There is no significant drainage.  Mild erythema in the left lower extremity with associated tenderness.  No crepitus.  Neurologic: Alert, grossly non-focal.   Psychiatric: Affect normal, judgment normal, mood normal, appears appropriate and not intoxicated.     COURSE & MEDICAL DECISION MAKING  Pertinent Labs & Imaging studies reviewed. (See chart for details)    Prior records obtained and reviewed: Patient was recently admitted to the hospital for wound evaluation and wound cultures were noted to be negative.  There were no signs of infection or drainage and although he was started on IV antibiotics, these were discontinued.  He was evaluated by plastic surgery and determined that the wound did not require surgery and that he would require close outpatient wound care.  He was discharged with wound care follow-up which was apparently explained to him in detail.    This is a 64-year-old male who is here for a wound check.  The wound itself appears to be healing with good granulation tissue.  There is no drainage.  The wound is not malodorous.  He is afebrile with normal vital signs. I have reviewed his prior records and I do not feel that he requires labs or imaging today.  He did have a negative DVT ultrasound " 2 weeks ago for the exact same symptoms.  He has a strong dorsalis pedis pulse in that foot making arterial phenomenon unlikely.  Antibiotics are not indicated.    I discussed the patient with our  and because of his extensive wound he qualifies for [facility].  I do feel that he would benefit from this for wound care as he is undomiciled and largely unable to make his wound care appointments.  This was discussed with the patient and he is happy with the plan.  We will keep him in the ER overnight until the morning when he will get a bed at wound care.  Social work will manage this.    FINAL IMPRESSION  1.  Wound    Electronically signed by: Tee Stearns M.D., 2/12/2020 11:13 PM

## 2020-02-13 NOTE — DISCHARGE PLANNING
LSW Supervisor notified by [employer] [facility] that they will be out to assess pt around 1200 today for potential placement in their temporary housing program.  LSW Supervisor updated bedside RN.

## 2020-02-13 NOTE — ED NOTES
Per report from break RN, pt became verbally aggressive when attempting d/c. Pt upset with d/c plan. Per ERP plan for transport to [facility] in the am via social work. Pt refusing VS at this time, will not remove jacket. Skin pink, warm, dry. NAD.

## 2020-02-13 NOTE — DISCHARGE PLANNING
LSW Supervisor contacted Eber Salinas @ [facility] ([phone]) to follow up on temporary housing referral sent early this AM.  Eber states they have not received anything and requested referral be resent to her via email ([email or web address]).  Secure email sent.

## 2020-02-13 NOTE — ED NOTES
Pt updated that SW is in communications with [facility]. No futher updates available at this time

## 2020-02-13 NOTE — ED NOTES
Patient was upset at time of discharge and yelling at staff. Patient was sen by Dr Stearns and plan of care is to keep until am and  will work on getting patient into [facility] in the am for wound care. Patient is now calm and lying down in the room.

## 2020-02-17 ENCOUNTER — HOSPITAL ENCOUNTER (OUTPATIENT)
Facility: MEDICAL CENTER | Age: 65
End: 2020-02-17
Attending: NURSE PRACTITIONER
Payer: MEDICARE

## 2020-02-17 ENCOUNTER — NON-PROVIDER VISIT (OUTPATIENT)
Dept: WOUND CARE | Facility: MEDICAL CENTER | Age: 65
End: 2020-02-17
Attending: EMERGENCY MEDICINE
Payer: MEDICARE

## 2020-02-17 DIAGNOSIS — L08.9 WOUND INFECTION: ICD-10-CM

## 2020-02-17 DIAGNOSIS — T14.8XXA WOUND INFECTION: Primary | ICD-10-CM

## 2020-02-17 DIAGNOSIS — T14.8XXA WOUND INFECTION: ICD-10-CM

## 2020-02-17 DIAGNOSIS — L08.9 WOUND INFECTION: Primary | ICD-10-CM

## 2020-02-17 LAB
GRAM STN SPEC: NORMAL
SIGNIFICANT IND 70042: NORMAL
SITE SITE: NORMAL
SOURCE SOURCE: NORMAL

## 2020-02-17 PROCEDURE — 97597 DBRDMT OPN WND 1ST 20 CM/<: CPT

## 2020-02-17 PROCEDURE — 87070 CULTURE OTHR SPECIMN AEROBIC: CPT

## 2020-02-17 PROCEDURE — 87077 CULTURE AEROBIC IDENTIFY: CPT

## 2020-02-17 PROCEDURE — 87186 SC STD MICRODIL/AGAR DIL: CPT

## 2020-02-17 PROCEDURE — 87205 SMEAR GRAM STAIN: CPT

## 2020-02-17 NOTE — PROCEDURES
Lido gel 2% to wound bed, dwell time 10 min.   CSWD with curette to remove <20 cm2 of wound bed slough and biofilm. Pt unable to tolerate full debridement.   KOSTAS done, discussed results with JOSH Blum OK to debride. Order for culture given.   Culture taken and sent.   Order for wound care supplies faxed to [facility].   Isaura, Supervisor, beth.   So, , stated that pt has appointment with [facility]'s PCP regarding pain management.   Per BETHANY Rizzo at [facility] will do wound care. Wound care orders faxed to [facility] [phone]. Spoke to Laxmi who said that MA would get faxed orders.

## 2020-02-17 NOTE — LETTER
February 17, 2020        Bola Varela  Wound care to LLE:   Cleanse wound with NS, gauze.   Skin Prep to periwound.  Aquacel AG to wound bed, cover with hydrofiber.   Wrap with roll gauze, secure with tape.   Change Thursday or Friday.     Apply Tubigrip daily to left leg ending 2 fingers below back of knee without wrinkles. If tubigrip is comfortable, pt may sleep with it on.                                   JOSH Rdz

## 2020-02-17 NOTE — CERTIFICATION
"Non Provider Encounter- Full Thickness wound    HISTORY OF PRESENT ILLNESS  Wound History:    START OF CARE IN CLINIC: 2/17/2020    REFERRING PROVIDER: Marcio Pinto     WOUND- Full Thickness Wound   LOCATION: Lt LE medial   HISTORY:Pt is 65 YO ,   Can't remember the name of the place he's living (not [address]), HH of [facility] because \"I can't affort it.\"  Pt states he fell at least 4 years ago, states it has never healed properly. States he cleans wound when he takes a shower \"that's all I can do.\"   Pt went to ER 1/31, started on IV antibiotics, culture done which was negative, IV stopped, pt discharged, told to f/u with wound care.   So, , at bedside. Pt lives at [facility], will have next 3 appointments covered and will be brought to [facility] by [facility]. Pt will have PCP appointment for pain management.     Pertinent Labs and Diagnostics:    Labs: culture 2/1/2020 negative     A1c:   Lab Results   Component Value Date/Time    HBA1C 5.7 (H) 11/09/2018 06:30 PM          IMAGING: none    VASCULAR STUDIES: 2/1/2020 venous no DVT    LAST  WOUND CULTURE:  DATE : 2/1/2020 negative            FALL RISK ASSESSMENT:fall risk   65 years or older  x   Fall within the last 2 years x  Uses ambulatory devices  Loss of protective sensation in feet   Use of prostethic/orthotic    Presence of lower extremity/foot/toe amputation   Taking medication that increases risk (per facility policy)     Interventions Recommended (if any of the above are selected):   Use of Assistive Device:   Supervision with ambulation: Caregiver   Assistance with ambulation: Caregiver   Home safety education: Educational material provided      PAST MEDICAL HISTORY:   Past Medical History:   Diagnosis Date   • Psychiatric problem    • Restless leg    • Tobacco use        PAST SURGICAL HISTORY:   Past Surgical History:   Procedure Laterality Date   • ANKLE ORIF Right         MEDICATIONS:   Current Outpatient Medications   Medication   • " ascorbic acid (VITAMIN C) 1000 MG tablet   • ferrous sulfate 325 (65 Fe) MG tablet   • multivitamin (THERAGRAN) Tab   • thiamine (THIAMINE) 100 MG tablet   • zinc sulfate (ZINCATE) 220 (50 Zn) MG Cap     No current facility-administered medications for this visit.        ALLERGIES:  No Known Allergies      SOCIAL HISTORY:   Social History     Socioeconomic History   • Marital status: Single     Spouse name: Not on file   • Number of children: Not on file   • Years of education: Not on file   • Highest education level: Not on file   Occupational History   • Not on file   Social Needs   • Financial resource strain: Not on file   • Food insecurity     Worry: Never true     Inability: Never true   • Transportation needs     Medical: No     Non-medical: No   Tobacco Use   • Smoking status: Light Tobacco Smoker     Packs/day: 0.00     Years: 40.00     Pack years: 0.00     Types: Cigarettes   • Smokeless tobacco: Never Used   • Tobacco comment: 1 ppd until few years ago   Substance and Sexual Activity   • Alcohol use: Yes     Frequency: 2-4 times a month     Drinks per session: 3 or 4     Binge frequency: Never     Comment: occasional    • Drug use: No     Types: Marijuana   • Sexual activity: Not Currently     Partners: Female   Lifestyle   • Physical activity     Days per week: Not on file     Minutes per session: Not on file   • Stress: Not on file   Relationships   • Social connections     Talks on phone: Not on file     Gets together: Not on file     Attends Anabaptism service: Not on file     Active member of club or organization: Not on file     Attends meetings of clubs or organizations: Not on file     Relationship status: Not on file   • Intimate partner violence     Fear of current or ex partner: Not on file     Emotionally abused: Not on file     Physically abused: Not on file     Forced sexual activity: Not on file   Other Topics Concern   • Not on file   Social History Narrative   • Not on file       FAMILY  HISTORY:   Family History   Problem Relation Age of Onset   • Heart Disease Mother    • No Known Problems Father    • Cancer Neg Hx    • Diabetes Neg Hx    • Stroke Neg Hx         Right Left   DP  120   PT  150   Brachial  135   [facility]       Wound Assessment:                                                         Wound 02/17/20 Full Thickness Wound LLE medial/lateral full thickness wound (Active)   Wound Image    2/17/2020  2:00 PM   Site Assessment Red;Brown;Yellow 2/17/2020  2:00 PM   Periwound Assessment Clean;Dry;Intact;Blanchable erythema;Fragile 2/17/2020  2:00 PM   Margins Unattached edges 2/17/2020  2:00 PM   Closure Secondary intention 2/17/2020  2:00 PM   Drainage Amount RONNI 2/17/2020  2:00 PM   Treatments CSWD - Conservative Sharp Wound Debridement;Pharmaceutical agent 2/17/2020  2:00 PM   Wound Cleansing Approved Wound Cleanser 2/17/2020  2:00 PM   Periwound Protectant Moisture Barrier;Skin Protectant Wipes to Periwound 2/17/2020  2:00 PM   Dressing Cleansing/Solutions Normal Saline 2/17/2020  2:00 PM   Dressing Options Dry Roll Gauze;Hydrofiber;Hydrofiber Silver;Hypafix Tape;Tubigrip 2/17/2020  2:00 PM   Dressing Changed New 2/17/2020  2:00 PM   Dressing Status Clean;Dry;Intact 2/17/2020  2:00 PM   Dressing Change/Treatment Frequency Every 72 hrs, and As Needed 2/17/2020  2:00 PM   Wound Length (cm) 26 cm 2/17/2020  2:00 PM   Wound Width (cm) 7 cm 2/17/2020  2:00 PM   Wound Surface Area (cm^2) 182 cm^2 2/17/2020  2:00 PM   Wound Depth (cm) 0.5 cm 2/17/2020  2:00 PM   Wound Volume (cm^3) 91 cm^3 2/17/2020  2:00 PM   Post-Procedure Length (cm) 26 cm 2/17/2020  2:00 PM   Post-Procedure Width (cm) 7 cm 2/17/2020  2:00 PM   Post-Procedure Depth (cm) 0.5 cm 2/17/2020  2:00 PM   Post-Procedure Surface Area (cm^2) 182 cm^2 2/17/2020  2:00 PM   Post-Procedure Volume (cm^3) 91 cm^3 2/17/2020  2:00 PM   Wound Bed Granulation (%) 0 % 2/17/2020  2:00 PM   Wound Bed Epithelium (%) 0 % 2/17/2020  2:00 PM   Wound Bed  Granulation (%) - Post-Procedure 0 % 2/17/2020  2:00 PM   Wound Bed Epithelium % - (Post-Procedure) 0 % 2/17/2020  2:00 PM   Wound Bed Slough % - (Post-Procedure) 10 % 2/17/2020  2:00 PM   Tunneling (cm) 0 cm 2/17/2020  2:00 PM   Wound Odor Mild;Other (Comments) 2/17/2020  2:00 PM   Pulses Left;DP;2+;PT;1+ 2/17/2020  2:00 PM   Exposed Structures None 2/17/2020  2:00 PM            Pre-debridement Photo      Procedures:    -2% viscous lidocaine applied topically to wound bed for approximately 5 minutes prior to debridement  -Curette used to debride wound bed. Approx 19 cm2 of wound bed debrided with curette, pt unable to tolerate full debridement.   -Refer to flowsheet for wound care details.       Post-debridement Photo              PATIENT EDUCATION  -Advised to go to ER for any increased redness, swelling, drainage or odor, or if patient develops fever, chills, nausea or vomiting.  -Importance of adequate nutrition for wound healing  -Increase protein intake (unless contraindicated by renal status)

## 2020-02-18 NOTE — PATIENT INSTRUCTIONS
Wound care to LLE wound:   Cleanse wound with NS, gauze.   Skin prep to periwound.   Aquacel AG to wound bed, cover with hydrofiber.   Wrap with roll gauze, secure with tape.   Apply Tubigrip.   Change dressing Thursday or Friday.   Avoid prolonged standing or sitting without elevating your legs.  - Apply tubigrip to your legs ending 2 fingers below back of knee without wrinkles.       Should you experience any significant changes in your wound(s), such as infection (redness, swelling, localized heat, increased pain, fever > 101 F, chills) or have any questions regarding your home care instructions, please contact the wound center at [phone]. If after hours, contact your primary care physician or go to the hospital emergency room.   Keep dressing clean, dry and covered while bathing. Only change dressing if it becomes over saturated, soiled or falls off.

## 2020-02-19 DIAGNOSIS — S81.802A OPEN LEG WOUND, LEFT, INITIAL ENCOUNTER: ICD-10-CM

## 2020-02-19 LAB
BACTERIA WND AEROBE CULT: ABNORMAL
GRAM STN SPEC: ABNORMAL
SIGNIFICANT IND 70042: ABNORMAL
SITE SITE: ABNORMAL
SOURCE SOURCE: ABNORMAL

## 2020-02-19 RX ORDER — SULFAMETHOXAZOLE AND TRIMETHOPRIM 800; 160 MG/1; MG/1
1 TABLET ORAL 2 TIMES DAILY
Qty: 20 TAB | Refills: 0 | Status: SHIPPED | OUTPATIENT
Start: 2020-02-19 | End: 2020-02-29

## 2020-02-19 NOTE — PROGRESS NOTES
I have reviewed culture results for wound culture taken to 2/17/2020.  Patient is positive for MRSA as well as beta-hemolytic streptococci.  I have ordered the patient a 10-day course of Bactrim.  Patient was at [facility] then discharged to [facility].  I have spoken with So from [facility] she has informed me that the patient went missing last night [facility] as of 2/19/2020.  I have informed So of the order for Bactrim if [facility] this [facility].  Unfortunately Bola does not have a cell phone or way to contact him and he is homeless.  So at [facility] has been the direct contact.

## 2020-02-24 ENCOUNTER — APPOINTMENT (OUTPATIENT)
Dept: WOUND CARE | Facility: MEDICAL CENTER | Age: 65
End: 2020-02-24
Payer: MEDICARE

## 2020-02-25 ENCOUNTER — APPOINTMENT (OUTPATIENT)
Dept: WOUND CARE | Facility: MEDICAL CENTER | Age: 65
End: 2020-02-25
Attending: EMERGENCY MEDICINE
Payer: MEDICARE

## 2020-03-02 ENCOUNTER — APPOINTMENT (OUTPATIENT)
Dept: WOUND CARE | Facility: MEDICAL CENTER | Age: 65
End: 2020-03-02
Payer: MEDICARE

## 2020-03-02 NOTE — ADDENDUM NOTE
Encounter addended by: Anisha Godwin, PharmD on: 3/1/2020 6:11 PM   Actions taken: Immunization record saved

## 2020-03-03 ENCOUNTER — APPOINTMENT (OUTPATIENT)
Dept: WOUND CARE | Facility: MEDICAL CENTER | Age: 65
End: 2020-03-03
Attending: EMERGENCY MEDICINE
Payer: MEDICARE

## 2020-03-03 ENCOUNTER — HOSPITAL ENCOUNTER (EMERGENCY)
Facility: MEDICAL CENTER | Age: 65
End: 2020-03-03
Payer: MEDICAID

## 2020-03-03 VITALS
WEIGHT: 147.93 LBS | BODY MASS INDEX: 20.71 KG/M2 | RESPIRATION RATE: 16 BRPM | DIASTOLIC BLOOD PRESSURE: 70 MMHG | SYSTOLIC BLOOD PRESSURE: 104 MMHG | OXYGEN SATURATION: 96 % | HEART RATE: 87 BPM | HEIGHT: 71 IN | TEMPERATURE: 98 F

## 2020-03-03 PROCEDURE — 302449 STATCHG TRIAGE ONLY (STATISTIC)

## 2020-03-03 ASSESSMENT — FIBROSIS 4 INDEX: FIB4 SCORE: 1.02

## 2020-03-03 NOTE — ED NOTES
"Pt complained about time it was taking for him to get back to a room. Pt said \"I'm going to miss dinner at the shelter if I wait here any longer.\" Pt was informed if he missed dinner to be here at the hospital then we would provide him with a meal. Pt stammered and walked out with his coat with no sign of coming back.  "

## 2020-03-03 NOTE — ED TRIAGE NOTES
Chief Complaint   Patient presents with   • Wound Check     open draining wound to LLE   pt JOVANNY LEWIS reports pt recently seen at [facility] for wound. EMS states wound from inner thigh ankle. Pt states painful.

## 2020-03-10 ENCOUNTER — APPOINTMENT (OUTPATIENT)
Dept: WOUND CARE | Facility: MEDICAL CENTER | Age: 65
End: 2020-03-10
Payer: MEDICARE

## 2020-03-17 ENCOUNTER — APPOINTMENT (OUTPATIENT)
Dept: WOUND CARE | Facility: MEDICAL CENTER | Age: 65
End: 2020-03-17
Payer: MEDICARE

## 2020-04-01 ENCOUNTER — APPOINTMENT (OUTPATIENT)
Dept: RADIOLOGY | Facility: MEDICAL CENTER | Age: 65
DRG: 603 | End: 2020-04-01
Attending: EMERGENCY MEDICINE
Payer: MEDICARE

## 2020-04-01 ENCOUNTER — HOSPITAL ENCOUNTER (INPATIENT)
Facility: MEDICAL CENTER | Age: 65
LOS: 3 days | DRG: 603 | End: 2020-04-04
Attending: EMERGENCY MEDICINE | Admitting: HOSPITALIST
Payer: MEDICARE

## 2020-04-01 DIAGNOSIS — S81.802D WOUND OF LEFT LOWER EXTREMITY, SUBSEQUENT ENCOUNTER: ICD-10-CM

## 2020-04-01 PROBLEM — R05.8 DRY COUGH: Status: ACTIVE | Noted: 2020-04-01

## 2020-04-01 LAB
ALBUMIN SERPL BCP-MCNC: 3.1 G/DL (ref 3.2–4.9)
ALBUMIN/GLOB SERPL: 0.8 G/DL
ALP SERPL-CCNC: 118 U/L (ref 30–99)
ALT SERPL-CCNC: 13 U/L (ref 2–50)
ANION GAP SERPL CALC-SCNC: 12 MMOL/L (ref 7–16)
AST SERPL-CCNC: 20 U/L (ref 12–45)
BASOPHILS # BLD AUTO: 0.5 % (ref 0–1.8)
BASOPHILS # BLD: 0.05 K/UL (ref 0–0.12)
BILIRUB SERPL-MCNC: 0.6 MG/DL (ref 0.1–1.5)
BUN SERPL-MCNC: 12 MG/DL (ref 8–22)
CALCIUM SERPL-MCNC: 8.7 MG/DL (ref 8.5–10.5)
CHLORIDE SERPL-SCNC: 100 MMOL/L (ref 96–112)
CO2 SERPL-SCNC: 24 MMOL/L (ref 20–33)
COVID ORDER STATUS COVID19: NORMAL
CREAT SERPL-MCNC: 0.75 MG/DL (ref 0.5–1.4)
EOSINOPHIL # BLD AUTO: 0.05 K/UL (ref 0–0.51)
EOSINOPHIL NFR BLD: 0.5 % (ref 0–6.9)
ERYTHROCYTE [DISTWIDTH] IN BLOOD BY AUTOMATED COUNT: 60.1 FL (ref 35.9–50)
GLOBULIN SER CALC-MCNC: 4 G/DL (ref 1.9–3.5)
GLUCOSE SERPL-MCNC: 103 MG/DL (ref 65–99)
GRAM STN SPEC: NORMAL
HCT VFR BLD AUTO: 38.9 % (ref 42–52)
HGB BLD-MCNC: 12.3 G/DL (ref 14–18)
IMM GRANULOCYTES # BLD AUTO: 0.05 K/UL (ref 0–0.11)
IMM GRANULOCYTES NFR BLD AUTO: 0.5 % (ref 0–0.9)
LACTATE BLD-SCNC: 1.6 MMOL/L (ref 0.5–2)
LYMPHOCYTES # BLD AUTO: 0.87 K/UL (ref 1–4.8)
LYMPHOCYTES NFR BLD: 8.2 % (ref 22–41)
MCH RBC QN AUTO: 28.1 PG (ref 27–33)
MCHC RBC AUTO-ENTMCNC: 31.6 G/DL (ref 33.7–35.3)
MCV RBC AUTO: 89 FL (ref 81.4–97.8)
MONOCYTES # BLD AUTO: 1.84 K/UL (ref 0–0.85)
MONOCYTES NFR BLD AUTO: 17.4 % (ref 0–13.4)
NEUTROPHILS # BLD AUTO: 7.71 K/UL (ref 1.82–7.42)
NEUTROPHILS NFR BLD: 72.9 % (ref 44–72)
NRBC # BLD AUTO: 0 K/UL
NRBC BLD-RTO: 0 /100 WBC
PLATELET # BLD AUTO: 349 K/UL (ref 164–446)
PMV BLD AUTO: 9.6 FL (ref 9–12.9)
POTASSIUM SERPL-SCNC: 3.7 MMOL/L (ref 3.6–5.5)
PROT SERPL-MCNC: 7.1 G/DL (ref 6–8.2)
RBC # BLD AUTO: 4.37 M/UL (ref 4.7–6.1)
SIGNIFICANT IND 70042: NORMAL
SITE SITE: NORMAL
SODIUM SERPL-SCNC: 136 MMOL/L (ref 135–145)
SOURCE SOURCE: NORMAL
WBC # BLD AUTO: 10.6 K/UL (ref 4.8–10.8)

## 2020-04-01 PROCEDURE — 83605 ASSAY OF LACTIC ACID: CPT

## 2020-04-01 PROCEDURE — 700102 HCHG RX REV CODE 250 W/ 637 OVERRIDE(OP): Performed by: HOSPITALIST

## 2020-04-01 PROCEDURE — 87205 SMEAR GRAM STAIN: CPT

## 2020-04-01 PROCEDURE — 700111 HCHG RX REV CODE 636 W/ 250 OVERRIDE (IP): Performed by: EMERGENCY MEDICINE

## 2020-04-01 PROCEDURE — 96367 TX/PROPH/DG ADDL SEQ IV INF: CPT

## 2020-04-01 PROCEDURE — 87147 CULTURE TYPE IMMUNOLOGIC: CPT

## 2020-04-01 PROCEDURE — 71045 X-RAY EXAM CHEST 1 VIEW: CPT

## 2020-04-01 PROCEDURE — 96365 THER/PROPH/DIAG IV INF INIT: CPT

## 2020-04-01 PROCEDURE — 770021 HCHG ROOM/CARE - ISO PRIVATE

## 2020-04-01 PROCEDURE — 96366 THER/PROPH/DIAG IV INF ADDON: CPT

## 2020-04-01 PROCEDURE — 85025 COMPLETE CBC W/AUTO DIFF WBC: CPT

## 2020-04-01 PROCEDURE — 700105 HCHG RX REV CODE 258: Performed by: HOSPITALIST

## 2020-04-01 PROCEDURE — 87186 SC STD MICRODIL/AGAR DIL: CPT

## 2020-04-01 PROCEDURE — 99285 EMERGENCY DEPT VISIT HI MDM: CPT

## 2020-04-01 PROCEDURE — 87077 CULTURE AEROBIC IDENTIFY: CPT | Mod: 91

## 2020-04-01 PROCEDURE — 700105 HCHG RX REV CODE 258: Performed by: EMERGENCY MEDICINE

## 2020-04-01 PROCEDURE — 80053 COMPREHEN METABOLIC PANEL: CPT

## 2020-04-01 PROCEDURE — 99223 1ST HOSP IP/OBS HIGH 75: CPT | Performed by: HOSPITALIST

## 2020-04-01 PROCEDURE — A9270 NON-COVERED ITEM OR SERVICE: HCPCS | Performed by: HOSPITALIST

## 2020-04-01 PROCEDURE — 700111 HCHG RX REV CODE 636 W/ 250 OVERRIDE (IP): Performed by: HOSPITALIST

## 2020-04-01 PROCEDURE — 87040 BLOOD CULTURE FOR BACTERIA: CPT | Mod: 91

## 2020-04-01 PROCEDURE — 87070 CULTURE OTHR SPECIMN AEROBIC: CPT

## 2020-04-01 RX ORDER — HYDROMORPHONE HYDROCHLORIDE 1 MG/ML
0.25 INJECTION, SOLUTION INTRAMUSCULAR; INTRAVENOUS; SUBCUTANEOUS
Status: DISCONTINUED | OUTPATIENT
Start: 2020-04-01 | End: 2020-04-04 | Stop reason: HOSPADM

## 2020-04-01 RX ORDER — LABETALOL HYDROCHLORIDE 5 MG/ML
10 INJECTION, SOLUTION INTRAVENOUS EVERY 4 HOURS PRN
Status: DISCONTINUED | OUTPATIENT
Start: 2020-04-01 | End: 2020-04-04 | Stop reason: HOSPADM

## 2020-04-01 RX ORDER — OXYCODONE HYDROCHLORIDE 5 MG/1
2.5 TABLET ORAL
Status: DISCONTINUED | OUTPATIENT
Start: 2020-04-01 | End: 2020-04-04 | Stop reason: HOSPADM

## 2020-04-01 RX ORDER — ONDANSETRON 4 MG/1
4 TABLET, ORALLY DISINTEGRATING ORAL EVERY 4 HOURS PRN
Status: DISCONTINUED | OUTPATIENT
Start: 2020-04-01 | End: 2020-04-04 | Stop reason: HOSPADM

## 2020-04-01 RX ORDER — ONDANSETRON 2 MG/ML
4 INJECTION INTRAMUSCULAR; INTRAVENOUS EVERY 4 HOURS PRN
Status: DISCONTINUED | OUTPATIENT
Start: 2020-04-01 | End: 2020-04-04 | Stop reason: HOSPADM

## 2020-04-01 RX ORDER — ACETAMINOPHEN 325 MG/1
650 TABLET ORAL EVERY 6 HOURS PRN
Status: DISCONTINUED | OUTPATIENT
Start: 2020-04-01 | End: 2020-04-04 | Stop reason: HOSPADM

## 2020-04-01 RX ORDER — OXYCODONE HYDROCHLORIDE 5 MG/1
5 TABLET ORAL
Status: DISCONTINUED | OUTPATIENT
Start: 2020-04-01 | End: 2020-04-04 | Stop reason: HOSPADM

## 2020-04-01 RX ORDER — AMOXICILLIN 250 MG
2 CAPSULE ORAL 2 TIMES DAILY
Status: DISCONTINUED | OUTPATIENT
Start: 2020-04-01 | End: 2020-04-04 | Stop reason: HOSPADM

## 2020-04-01 RX ORDER — POLYETHYLENE GLYCOL 3350 17 G/17G
1 POWDER, FOR SOLUTION ORAL
Status: DISCONTINUED | OUTPATIENT
Start: 2020-04-01 | End: 2020-04-04 | Stop reason: HOSPADM

## 2020-04-01 RX ORDER — BISACODYL 10 MG
10 SUPPOSITORY, RECTAL RECTAL
Status: DISCONTINUED | OUTPATIENT
Start: 2020-04-01 | End: 2020-04-04 | Stop reason: HOSPADM

## 2020-04-01 RX ADMIN — VANCOMYCIN HYDROCHLORIDE 1700 MG: 500 INJECTION, POWDER, LYOPHILIZED, FOR SOLUTION INTRAVENOUS at 09:51

## 2020-04-01 RX ADMIN — OXYCODONE HYDROCHLORIDE 5 MG: 5 TABLET ORAL at 22:49

## 2020-04-01 RX ADMIN — AMPICILLIN SODIUM AND SULBACTAM SODIUM 3 G: 2; 1 INJECTION, POWDER, FOR SOLUTION INTRAMUSCULAR; INTRAVENOUS at 08:08

## 2020-04-01 RX ADMIN — CEFTRIAXONE SODIUM 1 G: 1 INJECTION, POWDER, FOR SOLUTION INTRAMUSCULAR; INTRAVENOUS at 14:45

## 2020-04-01 RX ADMIN — OXYCODONE HYDROCHLORIDE 5 MG: 5 TABLET ORAL at 12:07

## 2020-04-01 RX ADMIN — OXYCODONE HYDROCHLORIDE 5 MG: 5 TABLET ORAL at 17:41

## 2020-04-01 ASSESSMENT — LIFESTYLE VARIABLES
DOES PATIENT WANT TO STOP DRINKING: NO
EVER HAD A DRINK FIRST THING IN THE MORNING TO STEADY YOUR NERVES TO GET RID OF A HANGOVER: NO
TOTAL SCORE: 0
HOW MANY TIMES IN THE PAST YEAR HAVE YOU HAD 5 OR MORE DRINKS IN A DAY: 0
HAVE PEOPLE ANNOYED YOU BY CRITICIZING YOUR DRINKING: NO
AVERAGE NUMBER OF DAYS PER WEEK YOU HAVE A DRINK CONTAINING ALCOHOL: 1
ALCOHOL_USE: YES
ON A TYPICAL DAY WHEN YOU DRINK ALCOHOL HOW MANY DRINKS DO YOU HAVE: 1
CONSUMPTION TOTAL: NEGATIVE
HAVE YOU EVER FELT YOU SHOULD CUT DOWN ON YOUR DRINKING: NO
EVER FELT BAD OR GUILTY ABOUT YOUR DRINKING: NO
TOTAL SCORE: 0
TOTAL SCORE: 0
EVER_SMOKED: YES

## 2020-04-01 ASSESSMENT — ENCOUNTER SYMPTOMS
COUGH: 1
FEVER: 0
SHORTNESS OF BREATH: 0
CHILLS: 0
MYALGIAS: 0
VOMITING: 0
BLURRED VISION: 0
ABDOMINAL PAIN: 0
HEADACHES: 0
DIZZINESS: 0
PALPITATIONS: 0
NAUSEA: 0

## 2020-04-01 ASSESSMENT — FIBROSIS 4 INDEX: FIB4 SCORE: 1.02

## 2020-04-01 NOTE — ED PROVIDER NOTES
"ED Provider Note    CHIEF COMPLAINT  Chief Complaint   Patient presents with   • Wound Check       HPI  Bola Varela is a 64 y.o. male who presents to the emergency department for wound check.  Patient has a chronic left lower extremity wound.  Is gotten worse over the last few days.  Now draining bertha pus.  On review of the chart he has had MRSA and strep.  States that he went to [place] and had a bandage applied but it came off.  He is been wearing the same scrub since then.  He was noted to have the scrubs completely adhered to the wound on the back of his leg by dried pus.  He has not had a fever.  He has pain and can no longer straighten the left leg.  He has not had a fever.  He does have a cough which he states is unchanged.  He has mild congestion runny nose.  Denies sore throat.  He smokes cigarettes.  He is homeless.    REVIEW OF SYSTEMS  As per HPI, otherwise a 10 point review of systems is negative    PAST MEDICAL HISTORY  Past Medical History:   Diagnosis Date   • Psychiatric problem    • Restless leg    • Tobacco use        SOCIAL HISTORY  Social History     Tobacco Use   • Smoking status: Light Tobacco Smoker     Packs/day: 0.00     Years: 40.00     Pack years: 0.00     Types: Cigarettes   • Smokeless tobacco: Never Used   • Tobacco comment: 1 ppd until few years ago   Substance Use Topics   • Alcohol use: Yes     Frequency: 2-4 times a month     Drinks per session: 3 or 4     Binge frequency: Never     Comment: occasional    • Drug use: No     Types: Marijuana       SURGICAL HISTORY  Past Surgical History:   Procedure Laterality Date   • ANKLE ORIF Right        CURRENT MEDICATIONS  Home Medications    **Home medications have not yet been reviewed for this encounter**         ALLERGIES  No Known Allergies    PHYSICAL EXAM  VITAL SIGNS: /69   Pulse 99   Temp 37.1 °C (98.8 °F) (Oral)   Resp 18   Ht 1.727 m (5' 8\")   Wt 68 kg (150 lb)   SpO2 94%   BMI 22.81 kg/m²  "   Constitutional: Awake and alert.  Chronically ill thin male.  Frequently coughing  HENT:  Atraumatic, Normocephalic.Oropharynx moist mucus membranes, Nose normal inspection.   Eyes: Normal inspection  Neck: Supple  Cardiovascular: Normal heart rate, Normal rhythm.  Symmetric peripheral pulses.   Thorax & Lungs: No respiratory distress, No wheezing, No rales, No rhonchi, No chest tenderness.   Abdomen: Bowel sounds normal, soft, non-distended, nontender, no mass  Skin: As extremity exam below  Back: No tenderness, No CVA tenderness.   Extremities: There is a large draining wound on the back of the left leg.  Previous surgical scars are noted.  Neurologic: Grossly normal   Psychiatric: Anxious appearing    RADIOLOGY/PROCEDURES  DX-CHEST-PORTABLE (1 VIEW)   Final Result      1.  Hyperinflation consistent with COPD.   2.  Minimal RIGHT lung base infiltrate or atelectasis.           Imaging is interpreted by radiologist    Labs:  Results for orders placed or performed during the hospital encounter of 04/01/20   CBC WITH DIFFERENTIAL   Result Value Ref Range    WBC 10.6 4.8 - 10.8 K/uL    RBC 4.37 (L) 4.70 - 6.10 M/uL    Hemoglobin 12.3 (L) 14.0 - 18.0 g/dL    Hematocrit 38.9 (L) 42.0 - 52.0 %    MCV 89.0 81.4 - 97.8 fL    MCH 28.1 27.0 - 33.0 pg    MCHC 31.6 (L) 33.7 - 35.3 g/dL    RDW 60.1 (H) 35.9 - 50.0 fL    Platelet Count 349 164 - 446 K/uL    MPV 9.6 9.0 - 12.9 fL    Neutrophils-Polys 72.90 (H) 44.00 - 72.00 %    Lymphocytes 8.20 (L) 22.00 - 41.00 %    Monocytes 17.40 (H) 0.00 - 13.40 %    Eosinophils 0.50 0.00 - 6.90 %    Basophils 0.50 0.00 - 1.80 %    Immature Granulocytes 0.50 0.00 - 0.90 %    Nucleated RBC 0.00 /100 WBC    Neutrophils (Absolute) 7.71 (H) 1.82 - 7.42 K/uL    Lymphs (Absolute) 0.87 (L) 1.00 - 4.80 K/uL    Monos (Absolute) 1.84 (H) 0.00 - 0.85 K/uL    Eos (Absolute) 0.05 0.00 - 0.51 K/uL    Baso (Absolute) 0.05 0.00 - 0.12 K/uL    Immature Granulocytes (abs) 0.05 0.00 - 0.11 K/uL    NRBC  (Absolute) 0.00 K/uL   COMP METABOLIC PANEL   Result Value Ref Range    Sodium 136 135 - 145 mmol/L    Potassium 3.7 3.6 - 5.5 mmol/L    Chloride 100 96 - 112 mmol/L    Co2 24 20 - 33 mmol/L    Anion Gap 12.0 7.0 - 16.0    Glucose 103 (H) 65 - 99 mg/dL    Bun 12 8 - 22 mg/dL    Creatinine 0.75 0.50 - 1.40 mg/dL    Calcium 8.7 8.5 - 10.5 mg/dL    AST(SGOT) 20 12 - 45 U/L    ALT(SGPT) 13 2 - 50 U/L    Alkaline Phosphatase 118 (H) 30 - 99 U/L    Total Bilirubin 0.6 0.1 - 1.5 mg/dL    Albumin 3.1 (L) 3.2 - 4.9 g/dL    Total Protein 7.1 6.0 - 8.2 g/dL    Globulin 4.0 (H) 1.9 - 3.5 g/dL    A-G Ratio 0.8 g/dL   LACTIC ACID   Result Value Ref Range    Lactic Acid 1.6 0.5 - 2.0 mmol/L   ESTIMATED GFR   Result Value Ref Range    GFR If African American >60 >60 mL/min/1.73 m 2    GFR If Non African American >60 >60 mL/min/1.73 m 2       COURSE & MEDICAL DECISION MAKING  Patient presents with an open draining wound on the back of the left leg.  This is significantly infected thankfully it appears to be localized to the wound.  I ordered vancomycin and Unasyn based on previous cultures.  He has had a significant cough lately.  He does have a mildly decreased lymphocyte count.  I ordered COVID testing.  I paged hospitalist for admission.    FINAL IMPRESSION  1.  Left lower extremity wound infection  2.  Cough, rule out COVID 19      This dictation was created using voice recognition software. The accuracy of the dictation is limited to the abilities of the software.  The nursing notes were reviewed and certain aspects of this information were incorporated into this note.      Electronically signed by: Jose Alejandro Panda M.D., 4/1/2020 7:37 AM

## 2020-04-01 NOTE — H&P
Hospital Medicine History & Physical Note    Date of Service  4/1/2020    Primary Care Physician  No primary care provider on file.    Consultants  None    Code Status  FULL    Chief Complaint  Wound check for leg    History of Presenting Illness  64 y.o. homeless male who presented 4/1/2020 for a wound check.  Patient has a chronic left lower extremity wound which has gotten worse over the last few days and is now draining bertha pus.  On review of the chart he has had MRSA and strep in the wound in the past.    He states he tries to keep it clean when he showers but it has been draining onto his pants.  states that he went to [place] and had a bandage applied but it came off.    He also notes a dry hacking cough over the last few weeks.    Review of Systems  Review of Systems   Constitutional: Negative for chills and fever.   Eyes: Negative for blurred vision.   Respiratory: Positive for cough (dry). Negative for shortness of breath.    Cardiovascular: Negative for chest pain and palpitations.   Gastrointestinal: Negative for abdominal pain, nausea and vomiting.   Genitourinary: Negative for dysuria.   Musculoskeletal: Negative for myalgias.        Left leg pain   Skin:        Wounds have been oozing for 3 days   Neurological: Negative for dizziness and headaches.   All other systems reviewed and are negative.      Past Medical History   has a past medical history of Psychiatric problem, Restless leg, and Tobacco use.    Surgical History   has a past surgical history that includes ankle orif (Right).     Family History  family history includes Heart Disease in his mother; No Known Problems in his father.     Social History   reports that he has been smoking cigarettes. He has been smoking about 0.00 packs per day for the past 40.00 years. He has never used smokeless tobacco. He reports current alcohol use. He reports that he does not use drugs.    Allergies  No Known Allergies    Medications  None       Physical  Exam  Temp:  [37.1 °C (98.8 °F)] 37.1 °C (98.8 °F)  Pulse:  [83-99] 83  Resp:  [18] 18  BP: (103-133)/(69-81) 133/81  SpO2:  [94 %-95 %] 95 %    Physical Exam  Vitals signs and nursing note reviewed.   Constitutional:       Appearance: He is well-developed.   HENT:      Head: Normocephalic and atraumatic.   Cardiovascular:      Rate and Rhythm: Normal rate and regular rhythm.      Heart sounds: Normal heart sounds. No murmur.   Pulmonary:      Effort: Pulmonary effort is normal. No respiratory distress.      Breath sounds: No wheezing or rales.      Comments: Frequent cough/hacking  RLL field diminished  Abdominal:      General: Bowel sounds are normal. There is no distension.      Palpations: Abdomen is soft.      Tenderness: There is no abdominal tenderness.   Musculoskeletal: Normal range of motion.   Skin:     General: Skin is warm and dry.      Findings: Erythema (mild surrounding erthema around medial and posterior wounds) and lesion ( large open wound on the posterior aspect of the left leg and on the medial aspect with pus/yellow discharge) present.   Neurological:      Mental Status: He is alert and oriented to person, place, and time.         Laboratory:  Recent Labs     04/01/20  0750   WBC 10.6   RBC 4.37*   HEMOGLOBIN 12.3*   HEMATOCRIT 38.9*   MCV 89.0   MCH 28.1   MCHC 31.6*   RDW 60.1*   PLATELETCT 349   MPV 9.6     Recent Labs     04/01/20  0750   SODIUM 136   POTASSIUM 3.7   CHLORIDE 100   CO2 24   GLUCOSE 103*   BUN 12   CREATININE 0.75   CALCIUM 8.7     Recent Labs     04/01/20  0750   ALTSGPT 13   ASTSGOT 20   ALKPHOSPHAT 118*   TBILIRUBIN 0.6   GLUCOSE 103*       Imaging:  DX-CHEST-PORTABLE (1 VIEW)   Final Result      1.  Hyperinflation consistent with COPD.   2.  Minimal RIGHT lung base infiltrate or atelectasis.            Assessment/Plan:  I anticipate this patient will require at least two midnights for appropriate medical management, necessitating inpatient admission.    * Cellulitis of  left lower extremity- (present on admission)  Assessment & Plan  Acute on chronic with bertha pus oozing from the wound  He has had this chronic leg wound for at least a year but has not been compliant with wound care as he is homeless  He has been started on vancomycin and Unasyn but I will change the Unasyn to ceftriaxone as it is once a day dosing and he is in COVID-19 isolation  So far he has a normal white blood cell count and does not appear septic  I have ordered wound cultures and blood cultures are also pending from the ER  I placed a wound consult as well  Apparently he has been seen by plastic surgery in the past but the recommendation was to let the wound heal on its own    Leg wound, 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)  Assessment & Plan  This is chronic, see above    Dry cough- (present on admission)  Assessment & Plan  He is homeless and stays at a shelter  He has had this dry cough for a number of weeks now  He has been placed in COVID-19 isolation and testing was ordered in the ER  No signs or symptoms of sepsis or pneumonia, and no hypoxia thus far  Chest x-ray unremarkable per my read      VTE prophylaxis: sq Lovenox

## 2020-04-01 NOTE — PROGRESS NOTES
"Pharmacy Kinetics 64 y.o. male on vancomycin day # 1 2020    Received Vancomycin 1,700 mg iv loading dose at 09:51 AM  Provider specified end date: TBD    Indication for Treatment: cellulitis of lower extremity    Pertinent history per medical record: Admitted on 2020 for SSTI/SOB.    The patient presented to the ER after staying at the [facility] (patient is homeless) for evaluation of large chronic left leg wounds. He has previous left leg wound cultures from  that grew MRSA (vancomycin CATHERINE = 1 mcg/mL) and beta-hemolytic streptococci, for which he was prescribed Bactrim.  He was also noted to have a cough in triage.     Other antibiotics: ceftriaxone 1g IV Q24h     Allergies: Patient has no known allergies.     List concerns for renal function: age, low albumin     Pertinent cultures to date:   20: Blood culture - in process   20: Blood culture - in process   Wound culture - needs collection     MRSA nares swab if pneumonia is a concern (ordered/positive/negative/n-a): n-a     Recent Labs     20  0750   WBC 10.6   NEUTSPOLYS 72.90*     Recent Labs     20  0750   BUN 12   CREATININE 0.75   ALBUMIN 3.1*       Intake/Output Summary (Last 24 hours) at 2020 0954  Last data filed at 2020 0952  Gross per 24 hour   Intake 100 ml   Output --   Net 100 ml      /81   Pulse 83   Temp 37.1 °C (98.8 °F) (Oral)   Resp 18   Ht 1.727 m (5' 8\")   Wt 68 kg (150 lb)   SpO2 95%  Temp (24hrs), Av.1 °C (98.8 °F), Min:37.1 °C (98.8 °F), Max:37.1 °C (98.8 °F)    A/P   1. Vancomycin dose change: 800 mg IV Q12h (10:00, 22:00)  2. Next vancomycin level: Trough prior to 4th dose (not yet ordered)  3. Goal trough: 12-16 mcg/mL   4. Comments: Based on previous vancomycin regimens this patient received, initiated vancomycin ~12 mg/kg IV Q12h. Concerns for renal accumulation of vancomycin listed above. A trough will be obtained at steady state. Pharmacy will continue to follow and " recommend de-escalation of antibiotics as appropriate.     Maliha Canales, PharmD, BCPS

## 2020-04-01 NOTE — ED NOTES
Med rec complete per pharmacy and Med rec completed on 01/31/20  Unable to interview pt or emergency contact.    No ABX filled in last 14 days.    Pt did not fill recently prescribed medications.    Per med rec completed on 01/31/20 pt states he does not take any medication.

## 2020-04-01 NOTE — PROGRESS NOTES
Report received from Mello ROCK RN. Pt arrived to the floor via gurney with Bennett TOBAR.  A/O x4. VSS. Responds appropriately. C/O pain, medicated per MAR, no SOB. Assessment complete. Open wound to the left leg noted, DIONNE with scant purulent drainage. Discussed POC, monitor VS/labs, COVID r/o, isolation, IV antbx,  pain control, mobility, safety, DC planning , pt verbalizes understanding. Explained importance of calling before getting OOB. Call light and belongings within reach. Bed alarm on. Bed in the lowest position. Treaded socks in place. Hourly rounding in progress. Will continue to monitor .

## 2020-04-01 NOTE — ASSESSMENT & PLAN NOTE
He is homeless and stays at a shelter  FEELS BETTER  COVID 19 is negative   (0) understands/communicates without difficulty

## 2020-04-01 NOTE — ASSESSMENT & PLAN NOTE
Acute on chronic   Rocephin is dced  Vancomycin is dced  Wound culture is noted  Sensitive to cefazolin  Called ortho for consult for the wound of  The left lower exts

## 2020-04-01 NOTE — CARE PLAN
Problem: Safety  Goal: Will remain free from injury  Outcome: PROGRESSING AS EXPECTED  Note: Treaded socks in place, bed in the lowest position, bed alarm on, call light and belongings within reach, pt call for assistance appropriately      Problem: Knowledge Deficit  Goal: Knowledge of disease process/condition, treatment plan, diagnostic tests, and medications will improve  Outcome: PROGRESSING AS EXPECTED  Note: Educated on POC, all questions answered, hourly rounding in progress     Problem: Pain Management  Goal: Pain level will decrease to patient's comfort goal  Outcome: PROGRESSING AS EXPECTED  Note: Medicated with oxycodone 5 mg per MAR with adequate pain control, hourly rounding in progress

## 2020-04-01 NOTE — PROGRESS NOTES
2 RN skin check complete with Bennett RN   Devices in place: PIV.  Skin assessed under devices: yes.  Confirmed pressure ulcers found on: none.  New potential pressure ulcers noted on : none. Wound consult placed for the left lower leg open wound.    Left leg open wound with scant purulent drainage, juvencio. Scabs to left heel and right foot, sacrum red but blanching.       The following interventions in place: pt turns self from side to side, juana skin assessment, heels offloaded with pillow.

## 2020-04-01 NOTE — ED TRIAGE NOTES
Pt to ED for large chronic leg wounds. Purulent drainage noted. Pt also c/o cough. [facility] homeless[facility]. Denies travel outside of [place]. VSS. No signs of distress.

## 2020-04-02 LAB
ANION GAP SERPL CALC-SCNC: 11 MMOL/L (ref 7–16)
BASOPHILS # BLD AUTO: 0.7 % (ref 0–1.8)
BASOPHILS # BLD: 0.05 K/UL (ref 0–0.12)
BUN SERPL-MCNC: 14 MG/DL (ref 8–22)
CALCIUM SERPL-MCNC: 8.5 MG/DL (ref 8.5–10.5)
CHLORIDE SERPL-SCNC: 97 MMOL/L (ref 96–112)
CO2 SERPL-SCNC: 24 MMOL/L (ref 20–33)
CREAT SERPL-MCNC: 0.8 MG/DL (ref 0.5–1.4)
EOSINOPHIL # BLD AUTO: 0.19 K/UL (ref 0–0.51)
EOSINOPHIL NFR BLD: 2.5 % (ref 0–6.9)
ERYTHROCYTE [DISTWIDTH] IN BLOOD BY AUTOMATED COUNT: 58.9 FL (ref 35.9–50)
GLUCOSE SERPL-MCNC: 100 MG/DL (ref 65–99)
HCT VFR BLD AUTO: 38 % (ref 42–52)
HGB BLD-MCNC: 12 G/DL (ref 14–18)
IMM GRANULOCYTES # BLD AUTO: 0.04 K/UL (ref 0–0.11)
IMM GRANULOCYTES NFR BLD AUTO: 0.5 % (ref 0–0.9)
LYMPHOCYTES # BLD AUTO: 1.32 K/UL (ref 1–4.8)
LYMPHOCYTES NFR BLD: 17.3 % (ref 22–41)
MCH RBC QN AUTO: 28.1 PG (ref 27–33)
MCHC RBC AUTO-ENTMCNC: 31.6 G/DL (ref 33.7–35.3)
MCV RBC AUTO: 89 FL (ref 81.4–97.8)
MONOCYTES # BLD AUTO: 1.32 K/UL (ref 0–0.85)
MONOCYTES NFR BLD AUTO: 17.3 % (ref 0–13.4)
NEUTROPHILS # BLD AUTO: 4.7 K/UL (ref 1.82–7.42)
NEUTROPHILS NFR BLD: 61.7 % (ref 44–72)
NRBC # BLD AUTO: 0 K/UL
NRBC BLD-RTO: 0 /100 WBC
PLATELET # BLD AUTO: 321 K/UL (ref 164–446)
PMV BLD AUTO: 9.2 FL (ref 9–12.9)
POTASSIUM SERPL-SCNC: 4 MMOL/L (ref 3.6–5.5)
RBC # BLD AUTO: 4.27 M/UL (ref 4.7–6.1)
SARS-COV-2 RNA RESP QL NAA+PROBE: NOT DETECTED
SODIUM SERPL-SCNC: 132 MMOL/L (ref 135–145)
SPECIMEN SOURCE: NORMAL
WBC # BLD AUTO: 7.6 K/UL (ref 4.8–10.8)

## 2020-04-02 PROCEDURE — 700105 HCHG RX REV CODE 258: Performed by: HOSPITALIST

## 2020-04-02 PROCEDURE — 700111 HCHG RX REV CODE 636 W/ 250 OVERRIDE (IP): Performed by: HOSPITALIST

## 2020-04-02 PROCEDURE — 770021 HCHG ROOM/CARE - ISO PRIVATE

## 2020-04-02 PROCEDURE — 99232 SBSQ HOSP IP/OBS MODERATE 35: CPT | Performed by: FAMILY MEDICINE

## 2020-04-02 PROCEDURE — 80048 BASIC METABOLIC PNL TOTAL CA: CPT

## 2020-04-02 PROCEDURE — 85025 COMPLETE CBC W/AUTO DIFF WBC: CPT

## 2020-04-02 PROCEDURE — 700105 HCHG RX REV CODE 258

## 2020-04-02 PROCEDURE — 700102 HCHG RX REV CODE 250 W/ 637 OVERRIDE(OP): Performed by: HOSPITALIST

## 2020-04-02 PROCEDURE — 36415 COLL VENOUS BLD VENIPUNCTURE: CPT

## 2020-04-02 PROCEDURE — 700101 HCHG RX REV CODE 250: Performed by: FAMILY MEDICINE

## 2020-04-02 PROCEDURE — 700111 HCHG RX REV CODE 636 W/ 250 OVERRIDE (IP): Performed by: FAMILY MEDICINE

## 2020-04-02 PROCEDURE — A9270 NON-COVERED ITEM OR SERVICE: HCPCS | Performed by: HOSPITALIST

## 2020-04-02 RX ORDER — SODIUM HYPOCHLORITE 1.25 MG/ML
SOLUTION TOPICAL PRN
Status: DISCONTINUED | OUTPATIENT
Start: 2020-04-02 | End: 2020-04-04 | Stop reason: HOSPADM

## 2020-04-02 RX ORDER — SODIUM CHLORIDE 9 MG/ML
INJECTION, SOLUTION INTRAVENOUS
Status: COMPLETED
Start: 2020-04-02 | End: 2020-04-02

## 2020-04-02 RX ORDER — CEFAZOLIN SODIUM 2 G/100ML
2 INJECTION, SOLUTION INTRAVENOUS EVERY 8 HOURS
Status: DISCONTINUED | OUTPATIENT
Start: 2020-04-02 | End: 2020-04-04

## 2020-04-02 RX ORDER — SODIUM HYPOCHLORITE 1.25 MG/ML
SOLUTION TOPICAL ONCE
Status: COMPLETED | OUTPATIENT
Start: 2020-04-02 | End: 2020-04-02

## 2020-04-02 RX ADMIN — SODIUM HYPOCHLORITE 1 ML: 1.25 SOLUTION TOPICAL at 09:00

## 2020-04-02 RX ADMIN — CEFAZOLIN SODIUM 2 G: 2 INJECTION, SOLUTION INTRAVENOUS at 18:04

## 2020-04-02 RX ADMIN — OXYCODONE HYDROCHLORIDE 5 MG: 5 TABLET ORAL at 18:04

## 2020-04-02 RX ADMIN — SODIUM CHLORIDE 500 ML: 9 INJECTION, SOLUTION INTRAVENOUS at 14:16

## 2020-04-02 RX ADMIN — VANCOMYCIN HYDROCHLORIDE 800 MG: 500 INJECTION, POWDER, LYOPHILIZED, FOR SOLUTION INTRAVENOUS at 03:09

## 2020-04-02 RX ADMIN — CEFAZOLIN SODIUM 2 G: 2 INJECTION, SOLUTION INTRAVENOUS at 21:28

## 2020-04-02 RX ADMIN — VANCOMYCIN HYDROCHLORIDE 800 MG: 500 INJECTION, POWDER, LYOPHILIZED, FOR SOLUTION INTRAVENOUS at 15:31

## 2020-04-02 RX ADMIN — OXYCODONE HYDROCHLORIDE 5 MG: 5 TABLET ORAL at 21:28

## 2020-04-02 RX ADMIN — OXYCODONE HYDROCHLORIDE 5 MG: 5 TABLET ORAL at 09:42

## 2020-04-02 RX ADMIN — OXYCODONE HYDROCHLORIDE 5 MG: 5 TABLET ORAL at 14:13

## 2020-04-02 RX ADMIN — OXYCODONE HYDROCHLORIDE 5 MG: 5 TABLET ORAL at 03:23

## 2020-04-02 RX ADMIN — ENOXAPARIN SODIUM 40 MG: 100 INJECTION SUBCUTANEOUS at 05:05

## 2020-04-02 RX ADMIN — CEFTRIAXONE SODIUM 1 G: 1 INJECTION, POWDER, FOR SOLUTION INTRAMUSCULAR; INTRAVENOUS at 14:13

## 2020-04-02 ASSESSMENT — COGNITIVE AND FUNCTIONAL STATUS - GENERAL
CLIMB 3 TO 5 STEPS WITH RAILING: A LITTLE
STANDING UP FROM CHAIR USING ARMS: A LITTLE
SUGGESTED CMS G CODE MODIFIER MOBILITY: CJ
DRESSING REGULAR LOWER BODY CLOTHING: A LITTLE
MOVING FROM LYING ON BACK TO SITTING ON SIDE OF FLAT BED: A LITTLE
DRESSING REGULAR UPPER BODY CLOTHING: A LITTLE
HELP NEEDED FOR BATHING: A LITTLE
WALKING IN HOSPITAL ROOM: A LITTLE
SUGGESTED CMS G CODE MODIFIER DAILY ACTIVITY: CJ
DAILY ACTIVITIY SCORE: 21
MOBILITY SCORE: 20

## 2020-04-02 ASSESSMENT — ENCOUNTER SYMPTOMS
TINGLING: 0
DIAPHORESIS: 0
DIZZINESS: 0
DOUBLE VISION: 0
CHILLS: 0
HEADACHES: 0
WHEEZING: 0
PALPITATIONS: 0
HEARTBURN: 0
NECK PAIN: 0
ORTHOPNEA: 0
NAUSEA: 0
VOMITING: 0
FEVER: 0
BLURRED VISION: 0
PHOTOPHOBIA: 0
MYALGIAS: 0
HEMOPTYSIS: 0
SPUTUM PRODUCTION: 0

## 2020-04-02 NOTE — PROGRESS NOTES
Hospital Medicine Daily Progress Note    Date of Service  4/2/2020    Chief Complaint  64 y.o. male admitted 4/1/2020 with leg wound check    Hospital Course    64 y.o. homeless male who presented 4/1/2020 for a wound check.  Patient has a chronic left lower extremity wound which has gotten worse over the last few days and is now draining bertha pus.  On review of the chart he has had MRSA and strep in the wound in the past.    He states he tries to keep it clean when he showers but it has been draining onto his pants.  states that he went to [place] and had a bandage applied but it came off.    He also notes a dry hacking cough over the last few weeks.       Interval Problem Update  covid 19 is pending    Consultants/Specialty  none    Code Status      Disposition  pending    Review of Systems  Review of Systems   Constitutional: Negative for chills, diaphoresis and fever.   HENT: Negative for ear discharge, ear pain and tinnitus.    Eyes: Negative for blurred vision, double vision and photophobia.   Respiratory: Negative for hemoptysis, sputum production and wheezing.    Cardiovascular: Negative for chest pain, palpitations and orthopnea.   Gastrointestinal: Negative for heartburn, nausea and vomiting.   Genitourinary: Negative for dysuria, frequency and urgency.   Musculoskeletal: Negative for myalgias and neck pain.   Skin: Negative for itching.   Neurological: Negative for dizziness, tingling and headaches.        Physical Exam  Temp:  [36.7 °C (98.1 °F)-37.2 °C (99 °F)] 36.7 °C (98.1 °F)  Pulse:  [77-96] 82  Resp:  [16-18] 16  BP: (128-141)/(67-78) 130/75  SpO2:  [93 %-97 %] 96 %    Physical Exam  Constitutional:       Appearance: Normal appearance.   HENT:      Head: Normocephalic and atraumatic.      Nose: Nose normal.      Mouth/Throat:      Mouth: Mucous membranes are dry.   Eyes:      Extraocular Movements: Extraocular movements intact.      Pupils: Pupils are equal, round, and reactive to light.    Neck:      Musculoskeletal: Normal range of motion and neck supple.   Cardiovascular:      Rate and Rhythm: Normal rate and regular rhythm.      Pulses: Normal pulses.      Heart sounds: Normal heart sounds.   Pulmonary:      Effort: No respiratory distress.      Breath sounds: No stridor.   Abdominal:      General: Abdomen is flat.      Palpations: Abdomen is soft.   Musculoskeletal: Normal range of motion.   Skin:     General: Skin is warm.      Comments: Wound is noted on the posterior left leg   Neurological:      General: No focal deficit present.      Mental Status: He is alert and oriented to person, place, and time.         Fluids    Intake/Output Summary (Last 24 hours) at 4/2/2020 1135  Last data filed at 4/2/2020 0600  Gross per 24 hour   Intake --   Output 750 ml   Net -750 ml       Laboratory  Recent Labs     04/01/20  0750 04/02/20  0457   WBC 10.6 7.6   RBC 4.37* 4.27*   HEMOGLOBIN 12.3* 12.0*   HEMATOCRIT 38.9* 38.0*   MCV 89.0 89.0   MCH 28.1 28.1   MCHC 31.6* 31.6*   RDW 60.1* 58.9*   PLATELETCT 349 321   MPV 9.6 9.2     Recent Labs     04/01/20  0750 04/02/20  0457   SODIUM 136 132*   POTASSIUM 3.7 4.0   CHLORIDE 100 97   CO2 24 24   GLUCOSE 103* 100*   BUN 12 14   CREATININE 0.75 0.80   CALCIUM 8.7 8.5                   Imaging       Assessment/Plan  * Cellulitis of left lower extremity- (present on admission)  Assessment & Plan  Acute on chronic with bertha pus oozing from the wound  Rocephin  vancomycin    Leg wound, 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)  Assessment & Plan  This is chronic, see above    Dry cough- (present on admission)  Assessment & Plan  He is homeless and stays at a shelter  FEELS BETTER  COVID 19 is pending       VTE prophylaxis: lovenox

## 2020-04-02 NOTE — PROGRESS NOTES
"Pharmacy Kinetics 64 y.o. male on vancomycin day # 2   2020    Currently on Vancomycin 800 mg iv q12hr  Provider specified end date: TBD    Indication for Treatment: cellulitis of lower extremity     Pertinent history per medical record: Admitted on 2020 for SSTI/SOB.     The patient presented to the ER after staying at the [facility] (patient is homeless) for evaluation of large chronic left leg wounds. He has previous left leg wound cultures from  that grew MRSA (vancomycin CATHERINE = 1 mcg/mL) and beta-hemolytic streptococci, for which he was prescribed Bactrim.  He was also noted to have a cough in triage.      Other antibiotics: ceftriaxone 1g IV Q24h      Allergies: Patient has no known allergies.      List concerns for renal function: age, low albumin      Pertinent cultures to date:   20: Blood culture - NGTD  20: Blood culture - NGTD  20: Wound culture - moderate GPC on gram stain      MRSA nares swab if pneumonia is a concern (ordered/positive/negative/n-a): n-a     Recent Labs     20  0750 20  0457   WBC 10.6 7.6   NEUTSPOLYS 72.90* 61.70     Recent Labs     20  0750 20  0457   BUN 12 14   CREATININE 0.75 0.80   ALBUMIN 3.1*  --      Intake/Output Summary (Last 24 hours) at 2020 1017  Last data filed at 2020 0600  Gross per 24 hour   Intake --   Output 750 ml   Net -750 ml      /75   Pulse 82   Temp 36.7 °C (98.1 °F) (Temporal)   Resp 16   Ht 1.727 m (5' 8\")   Wt 68 kg (150 lb)   SpO2 96%  Temp (24hrs), Av.1 °C (98.8 °F), Min:36.7 °C (98.1 °F), Max:37.2 °C (99 °F)      A/P   1. Vancomycin dose change: continue vancomycin 800 mg IV q12h   2. Next vancomycin level: 4/3 @ 0230 (ordered)   3. Goal trough: 12-16 mcg/mL  4. Comments: pt is afebrile with stable vital signs. Renal indices increased slightly. Moderate GPC noted on gram stain from wound culture. Will continue vancomycin at ~ 12 mg/kg IV q12h (pt obtained therapeutic levels on " this dose previously) and continue to monitor cultures for opportunities for antibiotic de-escalation.  Pharmacy will continue to monitor.     Leonarda Gasca, PharmD, BCOP

## 2020-04-02 NOTE — DISCHARGE PLANNING
Anticipated Discharge Disposition:   TBD    Action:    Pt only has Medicare part A for hospital care.    Left leg chronic wound with + wound cultures.  Receiving Vancomycin and ceftriaxone IV.  Wound care needs currently unknown.     Covid 19 test pending.  Pt homeless.    Barriers to Discharge:    Underinsured    Plan:    Complete assessment.

## 2020-04-02 NOTE — WOUND TEAM
[facility]  Inpatient Services  Initial Wound and Skin Care Evaluation    Admission Date: 4/1/2020     Last order of IP CONSULT TO WOUND CARE was found on 4/1/2020 from Hospital Encounter on 4/1/2020       HPI, PMH, SH: Reviewed    Unit where seen by Wound Team: [id number]/[id number]     WOUND CONSULT RELATED TO:  LLE *COVID r/o*    Self Report / Pain Level:  Pain during site care to posterior aspect of wound       OBJECTIVE:  Pt on pressure redistribution mattress. Dressing to LLE.    WOUND TYPE, LOCATION, CHARACTERISTICS (Pressure Injuries: location, stage, POA or date identified)    Wound 02/17/20 Traumatic LLE medial/lateral full thickness wound (Active)       Wound 04/02/20 Full Thickness Wound Knee Left (Active)   Wound Image      4/2/2020 11:00 AM   Site Assessment Brown;Red;White    Periwound Assessment Red;Pink;Dry    Margins Attached edges    Closure None    Drainage Amount Small    Drainage Description Foul purulent;Yellow    Treatments Cleansed;Site care    Wound Cleansing Approved Wound Cleanser    Periwound Protectant Not Applicable    Dressing Cleansing/Solutions 1/4 Strength Dakin's Solution    Dressing Options Dry Roll Gauze;Hypafix Tape    Dressing Changed New    Dressing Status Intact    Dressing Change/Treatment Frequency Daily, and As Needed    NEXT Dressing Change/Treatment Date 04/03/20    NEXT Weekly Photo (Inpatient Only) 04/09/20    Non-staged Wound Description Full thickness    Wound Length (cm) 16.5 cm    Wound Width (cm) 7 cm    Wound Depth (cm) 0.5 cm    Wound Surface Area (cm^2) 115.5 cm^2    Wound Volume (cm^3) 57.75 cm^3    Tunneling (cm) 0 cm    Undermining (cm) 0 cm    Shape irregular    Wound Odor Foul    Pulses 1+;DP    Exposed Structures None        Vascular:    KOSTAS:   No results found.      Lab Values:    Lab Results   Component Value Date/Time    WBC 7.6 04/02/2020 04:57 AM    RBC 4.27 (L) 04/02/2020 04:57 AM    HEMOGLOBIN 12.0 (L) 04/02/2020 04:57 AM    HEMATOCRIT 38.0 (L)  04/02/2020 04:57 AM    CREACTPROT 2.82 (H) 05/29/2019 03:43 AM    SEDRATEWES 95 (H) 05/29/2019 03:43 AM    HBA1C 5.7 (H) 11/09/2018 06:30 PM          Culture:   4/1,   Culture Results show:  Staphylococcus aureus   Moderate growth   Methicillin Sensitive via screening method   P       Culture Result Abnormal    Beta Hemolytic Streptococcus group A   Moderate growth               INTERVENTIONS BY WOUND TEAM:  Dressing removed from left knee. Wound and addison wound cleansed with wound cleanser and gauze. Slough removed during cleansing. Pictures and measurements obtained.dakins-moisten roll gauze applied to wound bed. Secured with hypafix tape. Dry roll gauze wrapped around knee. RN updated.    Interdisciplinary consultation: Patient, Nanda TOBAR, Dr. Lemus    EVALUATION: Pt presenting with full thickness wound to medial, posterior and lateral left knee. Seen during previous admission on 2/2/20. Per pt, wound was obtained after a traumatic fall about 2 years ago. Pt states he tries to clean it whenever he showers, but has not been able to shower lately. Pt is homeless with hx of non compliance. Pt is excellent candidate for surgical debridement and would recommend wound vac to site. However, given pt's hx of homelessness and inability to perform appropriate self care, wound vac not appropriate if pt were to DC to shelter. This has been communicated with Dr. Lemus. MD to obtain surgical consult.     Wound currently presenting with yellow, brown slough; foul odor; purulent yellow/brown drainage. Dakins soln was applied during this assessment to help clean wound bed via chemical debridement. Dakins will also help reduce odor. Consider switching to hydrogel dsg with silver powder after dakins therapy is complete. The latter will be a more appropriate dressing if pt returns to homeless shelter.  Wound to continue following patient.     Goals: Steady decrease in wound area and depth weekly.    NURSING PLAN OF CARE ORDERS  (X):    Dressing changes: See Dressing Care orders: x  Skin care: See Skin Care orders:   Rectal tube care: See Rectal Tube Care orders:   Other orders:    RSKIN:   CURRENTLY IN PLACE (X), APPLIED THIS VISIT (A), ORDERED (O):   Q shift [place]:  x  Q shift pressure point assessments:  x  Pressure redistribution mattress  x          Low Airloss          Bariatric NANCY         Bariatric foam           Heel float boots     Heel Silicone dressing        Float Heels off Bed with Pillows               Barrier wipes         Barrier Cream         Barrier paste          Sacral silicone dressing         Silicone O2 tubing         Anchorfast         Cannula fixation Device (Tender )          Gray Foam Ear protectors           Trach with Optifoam split foam                 Waffle cushion        Waffle Overlay         Rectal tube or BMS    Purwick/Condom Cath          Antifungal tx      Interdry          Reposition q 2 hours     X pt turns self   Up to chair        Ambulate      PT/OT        Dietician        Diabetes Education      PO  x   TF     TPN     NPO   # days   Other        WOUND TEAM PLAN OF CARE   Dressing changes by wound team:          Follow up 1-2 times weekly:  Nursing to perform dsg care, WT to follow weekly.            Follow up 3 times weekly:                NPWT change 3 times weekly:     Follow up as needed:       Other (explain):     Anticipated discharge plans: TBD will benefit from ongoing wound care.  LTACH:        SNF/Rehab:                  Home Care:           Outpatient Wound Center:            Self Care:

## 2020-04-02 NOTE — DIETARY
Nutrition Services: Brief Note  Day 1 of admit.  Bola Varela is a 64 y.o. male with admitting DX of Cellulitis of lower extremity.    Consult received for pressure injury upon nutrition screening. Per RN 4/1 skin check, pt with no confirmed or new pressure injuries. Left leg open wound not pressure related, no wound consult placed.     RD following per protocol- will rescreen weekly.

## 2020-04-02 NOTE — CARE PLAN
Problem: Communication  Goal: The ability to communicate needs accurately and effectively will improve  Outcome: PROGRESSING AS EXPECTED   Reminding patient to use call bell when he wants to use the bathroom instead of getting up on his own - continue to reinforce    Problem: Safety  Goal: Will remain free from injury  Outcome: PROGRESSING AS EXPECTED  Encourage the patient to use walker when ambulating to restroom - pt continues to go to restroom without walker or standby assist     Problem: Knowledge Deficit  Goal: Knowledge of disease process/condition, treatment plan, diagnostic tests, and medications will improve  Outcome: PROGRESSING AS EXPECTED  Continue to explain to patient the importance of taking care of his wound

## 2020-04-03 LAB
ALBUMIN SERPL BCP-MCNC: 3.2 G/DL (ref 3.2–4.9)
ALBUMIN/GLOB SERPL: 0.7 G/DL
ALP SERPL-CCNC: 122 U/L (ref 30–99)
ALT SERPL-CCNC: 16 U/L (ref 2–50)
ANION GAP SERPL CALC-SCNC: 13 MMOL/L (ref 7–16)
AST SERPL-CCNC: 22 U/L (ref 12–45)
BACTERIA WND AEROBE CULT: ABNORMAL
BASOPHILS # BLD AUTO: 0.8 % (ref 0–1.8)
BASOPHILS # BLD: 0.04 K/UL (ref 0–0.12)
BILIRUB SERPL-MCNC: 0.2 MG/DL (ref 0.1–1.5)
BUN SERPL-MCNC: 13 MG/DL (ref 8–22)
CALCIUM SERPL-MCNC: 8.9 MG/DL (ref 8.5–10.5)
CHLORIDE SERPL-SCNC: 94 MMOL/L (ref 96–112)
CO2 SERPL-SCNC: 26 MMOL/L (ref 20–33)
CREAT SERPL-MCNC: 0.91 MG/DL (ref 0.5–1.4)
EOSINOPHIL # BLD AUTO: 0.24 K/UL (ref 0–0.51)
EOSINOPHIL NFR BLD: 4.8 % (ref 0–6.9)
ERYTHROCYTE [DISTWIDTH] IN BLOOD BY AUTOMATED COUNT: 58.4 FL (ref 35.9–50)
GLOBULIN SER CALC-MCNC: 4.4 G/DL (ref 1.9–3.5)
GLUCOSE SERPL-MCNC: 91 MG/DL (ref 65–99)
GRAM STN SPEC: ABNORMAL
HCT VFR BLD AUTO: 39.6 % (ref 42–52)
HGB BLD-MCNC: 12.8 G/DL (ref 14–18)
IMM GRANULOCYTES # BLD AUTO: 0.02 K/UL (ref 0–0.11)
IMM GRANULOCYTES NFR BLD AUTO: 0.4 % (ref 0–0.9)
LYMPHOCYTES # BLD AUTO: 0.95 K/UL (ref 1–4.8)
LYMPHOCYTES NFR BLD: 19.1 % (ref 22–41)
MCH RBC QN AUTO: 28.7 PG (ref 27–33)
MCHC RBC AUTO-ENTMCNC: 32.3 G/DL (ref 33.7–35.3)
MCV RBC AUTO: 88.8 FL (ref 81.4–97.8)
MONOCYTES # BLD AUTO: 0.97 K/UL (ref 0–0.85)
MONOCYTES NFR BLD AUTO: 19.5 % (ref 0–13.4)
NEUTROPHILS # BLD AUTO: 2.75 K/UL (ref 1.82–7.42)
NEUTROPHILS NFR BLD: 55.4 % (ref 44–72)
NRBC # BLD AUTO: 0 K/UL
NRBC BLD-RTO: 0 /100 WBC
PLATELET # BLD AUTO: 354 K/UL (ref 164–446)
PMV BLD AUTO: 9.3 FL (ref 9–12.9)
POTASSIUM SERPL-SCNC: 3.7 MMOL/L (ref 3.6–5.5)
PROT SERPL-MCNC: 7.6 G/DL (ref 6–8.2)
RBC # BLD AUTO: 4.46 M/UL (ref 4.7–6.1)
SIGNIFICANT IND 70042: ABNORMAL
SITE SITE: ABNORMAL
SODIUM SERPL-SCNC: 133 MMOL/L (ref 135–145)
SOURCE SOURCE: ABNORMAL
WBC # BLD AUTO: 5 K/UL (ref 4.8–10.8)

## 2020-04-03 PROCEDURE — A9270 NON-COVERED ITEM OR SERVICE: HCPCS | Performed by: HOSPITALIST

## 2020-04-03 PROCEDURE — 36415 COLL VENOUS BLD VENIPUNCTURE: CPT

## 2020-04-03 PROCEDURE — 700101 HCHG RX REV CODE 250: Performed by: FAMILY MEDICINE

## 2020-04-03 PROCEDURE — 80053 COMPREHEN METABOLIC PANEL: CPT

## 2020-04-03 PROCEDURE — 700102 HCHG RX REV CODE 250 W/ 637 OVERRIDE(OP): Performed by: HOSPITALIST

## 2020-04-03 PROCEDURE — 700111 HCHG RX REV CODE 636 W/ 250 OVERRIDE (IP): Performed by: HOSPITALIST

## 2020-04-03 PROCEDURE — 85025 COMPLETE CBC W/AUTO DIFF WBC: CPT

## 2020-04-03 PROCEDURE — 99232 SBSQ HOSP IP/OBS MODERATE 35: CPT | Performed by: FAMILY MEDICINE

## 2020-04-03 PROCEDURE — 700111 HCHG RX REV CODE 636 W/ 250 OVERRIDE (IP): Performed by: FAMILY MEDICINE

## 2020-04-03 PROCEDURE — 770021 HCHG ROOM/CARE - ISO PRIVATE

## 2020-04-03 RX ORDER — LORAZEPAM 2 MG/ML
0.5 INJECTION INTRAMUSCULAR ONCE
Status: COMPLETED | OUTPATIENT
Start: 2020-04-03 | End: 2020-04-03

## 2020-04-03 RX ADMIN — OXYCODONE HYDROCHLORIDE 5 MG: 5 TABLET ORAL at 12:04

## 2020-04-03 RX ADMIN — SODIUM HYPOCHLORITE 1 ML: 1.25 SOLUTION TOPICAL at 21:45

## 2020-04-03 RX ADMIN — ACETAMINOPHEN 650 MG: 325 TABLET, FILM COATED ORAL at 18:09

## 2020-04-03 RX ADMIN — OXYCODONE HYDROCHLORIDE 5 MG: 5 TABLET ORAL at 21:12

## 2020-04-03 RX ADMIN — CEFAZOLIN SODIUM 2 G: 2 INJECTION, SOLUTION INTRAVENOUS at 15:00

## 2020-04-03 RX ADMIN — OXYCODONE HYDROCHLORIDE 5 MG: 5 TABLET ORAL at 18:09

## 2020-04-03 RX ADMIN — CEFAZOLIN SODIUM 2 G: 2 INJECTION, SOLUTION INTRAVENOUS at 06:06

## 2020-04-03 RX ADMIN — CEFAZOLIN SODIUM 2 G: 2 INJECTION, SOLUTION INTRAVENOUS at 21:13

## 2020-04-03 RX ADMIN — OXYCODONE HYDROCHLORIDE 5 MG: 5 TABLET ORAL at 00:41

## 2020-04-03 RX ADMIN — OXYCODONE HYDROCHLORIDE 5 MG: 5 TABLET ORAL at 03:51

## 2020-04-03 RX ADMIN — LORAZEPAM 0.5 MG: 2 INJECTION INTRAMUSCULAR; INTRAVENOUS at 23:23

## 2020-04-03 RX ADMIN — OXYCODONE HYDROCHLORIDE 5 MG: 5 TABLET ORAL at 14:59

## 2020-04-03 RX ADMIN — OXYCODONE HYDROCHLORIDE 5 MG: 5 TABLET ORAL at 08:40

## 2020-04-03 ASSESSMENT — ENCOUNTER SYMPTOMS
VOMITING: 0
HEMOPTYSIS: 0
COUGH: 0
NECK PAIN: 0
TREMORS: 0
TINGLING: 0
ORTHOPNEA: 0
DIAPHORESIS: 0
PHOTOPHOBIA: 0
HEADACHES: 0
DOUBLE VISION: 0
SPUTUM PRODUCTION: 0
ABDOMINAL PAIN: 0
EYE PAIN: 0
MYALGIAS: 0
NAUSEA: 0
PALPITATIONS: 0
CHILLS: 0

## 2020-04-03 NOTE — PROGRESS NOTES
Assumed care of patient 1900. A/O x4 but got out of bed, forgetting to call. Descalated patient, returned to bed, and reinforced education about call light and urinal. Moved urinal to a different spot that is more visible to patient to hopefully help him remember. Discussed plan of care and pain plan as well, needs addressed. Bed alarm on.

## 2020-04-03 NOTE — PROGRESS NOTES
Hospital Medicine Daily Progress Note    Date of Service  4/3/2020    Chief Complaint  64 y.o. male admitted 4/1/2020 with leg wound check    Hospital Course    64 y.o. homeless male who presented 4/1/2020 for a wound check.  Patient has a chronic left lower extremity wound which has gotten worse over the last few days and is now draining bertha pus.  On review of the chart he has had MRSA and strep in the wound in the past.    He states he tries to keep it clean when he showers but it has been draining onto his pants.  states that he went to [place] and had a bandage applied but it came off.    He also notes a dry hacking cough over the last few weeks.       Interval Problem Update  covid 19 is negative    Consultants/Specialty  ortho    Code Status      Disposition  pending    Review of Systems  Review of Systems   Constitutional: Negative for chills, diaphoresis and malaise/fatigue.   HENT: Negative for ear discharge, ear pain and nosebleeds.    Eyes: Negative for double vision, photophobia and pain.   Respiratory: Negative for cough, hemoptysis and sputum production.    Cardiovascular: Negative for chest pain, palpitations and orthopnea.   Gastrointestinal: Negative for abdominal pain, nausea and vomiting.   Genitourinary: Negative for frequency, hematuria and urgency.   Musculoskeletal: Negative for myalgias and neck pain.   Skin: Negative for itching.   Neurological: Negative for tingling, tremors and headaches.        Physical Exam  Temp:  [36.5 °C (97.7 °F)-37.1 °C (98.7 °F)] 36.5 °C (97.7 °F)  Pulse:  [] 100  Resp:  [15-18] 17  BP: (124-138)/(79-88) 133/88  SpO2:  [92 %-96 %] 92 %    Physical Exam  Constitutional:       General: He is not in acute distress.     Appearance: He is not toxic-appearing.   HENT:      Head: Normocephalic and atraumatic.      Nose: No congestion or rhinorrhea.      Mouth/Throat:      Mouth: Mucous membranes are dry.   Eyes:      Conjunctiva/sclera: Conjunctivae normal.    Neck:      Musculoskeletal: No neck rigidity or muscular tenderness.   Cardiovascular:      Rate and Rhythm: Normal rate and regular rhythm.      Heart sounds: No murmur. No friction rub.   Pulmonary:      Effort: Pulmonary effort is normal.      Breath sounds: Normal breath sounds.   Abdominal:      General: Bowel sounds are normal.   Musculoskeletal: Normal range of motion.   Skin:     Coloration: Skin is not jaundiced or pale.      Comments: Wound is noted on the posterior left leg   Neurological:      General: No focal deficit present.      Mental Status: Mental status is at baseline.         Fluids    Intake/Output Summary (Last 24 hours) at 4/3/2020 1016  Last data filed at 4/2/2020 1948  Gross per 24 hour   Intake 240 ml   Output --   Net 240 ml       Laboratory  Recent Labs     04/01/20 0750 04/02/20 0457 04/03/20 0411   WBC 10.6 7.6 5.0   RBC 4.37* 4.27* 4.46*   HEMOGLOBIN 12.3* 12.0* 12.8*   HEMATOCRIT 38.9* 38.0* 39.6*   MCV 89.0 89.0 88.8   MCH 28.1 28.1 28.7   MCHC 31.6* 31.6* 32.3*   RDW 60.1* 58.9* 58.4*   PLATELETCT 349 321 354   MPV 9.6 9.2 9.3     Recent Labs     04/01/20 0750 04/02/20 0457 04/03/20 0411   SODIUM 136 132* 133*   POTASSIUM 3.7 4.0 3.7   CHLORIDE 100 97 94*   CO2 24 24 26   GLUCOSE 103* 100* 91   BUN 12 14 13   CREATININE 0.75 0.80 0.91   CALCIUM 8.7 8.5 8.9                   Imaging       Assessment/Plan  * Cellulitis of left lower extremity- (present on admission)  Assessment & Plan  Acute on chronic   Rocephin is dced  Vancomycin is dced  Wound culture is noted  Sensitive to cefazolin  Called ortho for consult for the wound of  The left lower exts    Leg wound, 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)  Assessment & Plan  This is chronic, see above    Dry cough- (present on admission)  Assessment & Plan  He is homeless and stays at a shelter  FEELS BETTER  COVID 19 is negative       VTE prophylaxis: lovenox

## 2020-04-03 NOTE — CONSULTS
DATE OF SERVICE:  04/03/2020    ORTHOPEDIC CONSULTATION    REQUESTING PHYSICIAN:  Rose Lemus MD, hospitalist service.    REASON FOR CONSULTATION:  Left leg wound.    HISTORY OF PRESENT ILLNESS:  The patient is a 64-year-old homeless male, who   was admitted on 04/01/2020 to the hospitalist service with a chronic left   lower extremity wound that he stated he had gotten worse over the last several   days.  He is homeless.  He states that he goes to [facility] 3   times a week to wash his wound.  He describes this wound being present for   many years, states that it was from essentially a degloving type injury years   ago.  He denies ever having surgery on it.  He also had a dry hacking cough   and has been evaluated for COVID-19, which was negative during this   hospitalization.  He has been having local wound care to the extremity.    PAST MEDICAL HISTORY:  ALLERGIES:  No known drug allergies.    PERTINENT INPATIENT MEDICATIONS:  Include Dilaudid, oxycodone, Lovenox, Ancef,   Dakin solution, labetalol.    PAST SURGICAL HISTORY:  Right ankle ORIF in the past.    PAST MEDICAL DIAGNOSES:  Psychiatric disorder, tobacco use, restless legs,   homelessness.    FAMILY HISTORY:  Significant for heart disease in his mother.    SOCIAL HISTORY:  The patient does smoke cigarettes.  He does drink alcohol.    He reportedly does not use illicit drugs.  He is homeless.    REVIEW OF SYSTEMS:  He denies fevers, chills, nausea, vomiting, shortness of   breath, chest pain currently.  Otherwise, normal per AMA criteria other than   that is stated in the HPI.    PHYSICAL EXAMINATION:  VITAL SIGNS:  Temperature is 97.7, heart rate 100, respiratory rate 17, blood   pressure 133/88, pulse oximetry 92% on room air.  GENERAL APPEARANCE:  Patient is alert, pleasant, cooperative, in no acute   distress.  HEAD, EYES, EARS, NOSE AND THROAT:  Normocephalic and atraumatic.  Mucous   membranes are moist.  PULMONARY:  Symmetric,  unlabored breathing.  CARDIOVASCULAR:  Extremities are well perfused.  ABDOMEN:  Thin, not obviously distended.  MUSCULOSKELETAL:  Left lower extremity, his dressing was removed.  He has a   large open wound with a healthy bed of granulation tissue extending from the   proximal posteromedial leg extending posteriorly to the distal lateral thigh.    He has a flexion contracture of the knee to about 30-40 degrees.  His knee   flexion is to at least 100 degrees.  He is neurovascularly intact distally.    There is no significant erythema around the skin.  There is no evidence of   obvious gross purulent drainage.    LABORATORY DATA:  White blood cell count is 5.0.    MICROBIOLOGY:  Wound cultures from the leg wound show beta-hemolytic strep A   moderate growth as well as MSSA.    ASSESSMENT:  A 64-year-old homeless male with a chronic right leg wound.  He   is a poor historian, but it appears that he has not ever had surgical   debridement for this wound.  Currently, the wound actually appears relatively   clean after local wound care and antibiotic treatment.  He does have a flexion   contracture of the knee and the wound does extend posteriorly along the   popliteal fossa.    RECOMMENDATIONS:  1.  I discussed these findings in detail with the patient and the wound does   appear clean.  I do not feel that it will require surgical debridement, but he   would benefit from ongoing wound care.  It sounds as though his wound is   chronic and not sure he is a great candidate for wound VAC therapy and the   wound actually appears relatively clean with local wound care.  Wound VAC   therapy may be an option for a short period of time to see if we can help   expedite healing of this wound, which again the patient states has been there   for years.  2.  He may benefit from evaluation by plastic surgery to determine whether or   not he would be a candidate for skin graft in this complex area posterior to   the knee to help  achieve definitive healing, but this may be a high risk   procedure in a patient with his social situation.  3.  At this point, I will sign off.  Please feel free to contact me if the   appearance of his wound worsens or if I can be of any further assistance.       ____________________________________     MD JADYN Hutton / BLANCA    DD:  04/03/2020 15:22:25  DT:  04/03/2020 15:41:03    D#:  [id number]  Job#:  [id number]

## 2020-04-04 VITALS
DIASTOLIC BLOOD PRESSURE: 81 MMHG | WEIGHT: 150 LBS | HEART RATE: 83 BPM | HEIGHT: 68 IN | TEMPERATURE: 97.6 F | RESPIRATION RATE: 16 BRPM | SYSTOLIC BLOOD PRESSURE: 143 MMHG | OXYGEN SATURATION: 93 % | BODY MASS INDEX: 22.73 KG/M2

## 2020-04-04 PROCEDURE — 99239 HOSP IP/OBS DSCHRG MGMT >30: CPT | Performed by: FAMILY MEDICINE

## 2020-04-04 PROCEDURE — 700111 HCHG RX REV CODE 636 W/ 250 OVERRIDE (IP): Performed by: FAMILY MEDICINE

## 2020-04-04 PROCEDURE — 700102 HCHG RX REV CODE 250 W/ 637 OVERRIDE(OP): Performed by: HOSPITALIST

## 2020-04-04 PROCEDURE — A9270 NON-COVERED ITEM OR SERVICE: HCPCS | Performed by: HOSPITALIST

## 2020-04-04 RX ORDER — CEPHALEXIN 500 MG/1
500 CAPSULE ORAL 3 TIMES DAILY
Qty: 21 CAP | Refills: 0 | Status: SHIPPED | OUTPATIENT
Start: 2020-04-04 | End: 2020-08-07

## 2020-04-04 RX ORDER — ACETAMINOPHEN 325 MG/1
650 TABLET ORAL EVERY 6 HOURS PRN
Qty: 30 TAB | Refills: 0 | Status: SHIPPED | OUTPATIENT
Start: 2020-04-04 | End: 2020-08-07

## 2020-04-04 RX ADMIN — OXYCODONE HYDROCHLORIDE 5 MG: 5 TABLET ORAL at 05:52

## 2020-04-04 RX ADMIN — OXYCODONE HYDROCHLORIDE 5 MG: 5 TABLET ORAL at 01:10

## 2020-04-04 RX ADMIN — CEFAZOLIN SODIUM 2 G: 2 INJECTION, SOLUTION INTRAVENOUS at 05:52

## 2020-04-04 NOTE — DISCHARGE INSTRUCTIONS
Discharge Instructions    Discharged to other by taxi with self. Discharged via walking, hospital escort: Yes.  Special equipment needed: Not Applicable    Be sure to schedule a follow-up appointment with your primary care doctor or any specialists as instructed.     Discharge Plan:   Diet Plan: Discussed  Activity Level: Discussed  Smoking Cessation Offered: Patient Refused  Confirmed Follow up Appointment: Patient to Call and Schedule Appointment  Confirmed Symptoms Management: Discussed  Medication Reconciliation Updated: Yes  Influenza Vaccine Indication: Not indicated: Previously immunized this influenza season and > 8 years of age    I understand that a diet low in cholesterol, fat, and sodium is recommended for good health. Unless I have been given specific instructions below for another diet, I accept this instruction as my diet prescription.   Other diet: regular    Special Instructions: None    · Is patient discharged on Warfarin / Coumadin?   No     Depression / Suicide Risk    As you are discharged from this [facility] facility, it is important to learn how to keep safe from harming yourself.    Recognize the warning signs:  · Abrupt changes in personality, positive or negative- including increase in energy   · Giving away possessions  · Change in eating patterns- significant weight changes-  positive or negative  · Change in sleeping patterns- unable to sleep or sleeping all the time   · Unwillingness or inability to communicate  · Depression  · Unusual sadness, discouragement and loneliness  · Talk of wanting to die  · Neglect of personal appearance   · Rebelliousness- reckless behavior  · Withdrawal from people/activities they love  · Confusion- inability to concentrate     If you or a loved one observes any of these behaviors or has concerns about self-harm, here's what you can do:  · Talk about it- your feelings and reasons for harming yourself  · Remove any means that you might use to hurt  yourself (examples: pills, rope, extension cords, firearm)  · Get professional help from the community (Mental Health, Substance Abuse, psychological counseling)  · Do not be alone:Call your Safe Contact- someone whom you trust who will be there for you.  · Call your local CRISIS HOTLINE 325-2955 or 533-179-8493  · Call your local Children's Mobile Crisis Response Team [place] (784) 539-4003 or wwwBike HUD  · Call the toll free National Suicide Prevention Hotlines   · National Suicide Prevention Lifeline 276-890-DOUK (6295)  · National Hope Line Network 800-SUICIDE (697-4743)      Antibiotic Medicine  Antibiotic medicines are used to treat infections caused by bacteria. They work by injuring or killing the bacteria that is making you sick.  HOW IS AN ANTIBIOTIC CHOSEN?  An antibiotic is chosen based on many factors. To help your health care provider choose one for you, tell your health care provider if:  · You have any allergies.  · You are pregnant or plan to get pregnant.  · You are breastfeeding.  · You are taking any medicines. These include over-the-counter medicines, prescription medicines, and herbal remedies.  · You have a medical condition or problem you have not already discussed.  Your health care provider will also consider:  · How often the medicine has to be taken.  · Common side effects of the medicine.  · The cost of the medicine.  · The taste of the medicine.  If you have questions about why an antibiotic was chosen, make sure to ask.  FOR HOW LONG SHOULD I TAKE MY ANTIBIOTIC?  Continue to take your antibiotic for as long as told by your health care provider. Do not stop taking it when you feel better. If you stop taking it too soon:  · You may start to feel sick again.  · Your infection may become harder to treat.  · Complications may develop.  WHAT IF I MISS A DOSE?  Try not to miss any doses of medicine. If you miss a dose, take it as soon as possible. However, if it is almost time  for the next dose:  · If you are taking 2 doses per day, take the missed dose and the next dose 5 to 6 hours apart.  · If you are taking 3 or more doses per day, take the missed dose and the next dose 2 to 4 hours apart, then go back to the normal schedule.  If you cannot make up a missed dose, take the next scheduled dose on time. Then take the missed dose after you have taken all the doses as recommended by your health care provider, as if you had one more dose left.  DO ANTIBIOTICS AFFECT BIRTH CONTROL?  Birth control pills may not work while you are on antibiotics. If you are taking birth control pills, continue taking them as usual and use a second form of birth control, such as a condom, to avoid unwanted pregnancy. Continue using the second form of birth control until you are finished with your current 1 month cycle of birth control pills.  OTHER INFORMATION  · If there is any medicine left over, throw it away.  · Never take someone else's antibiotics.  · Never take leftover antibiotics.  SEEK MEDICAL CARE IF:  · You get worse.  · You do not feel better within a few days of starting the antibiotic medicine.  · You vomit.  · White patches appear in your mouth.  · You have new joint pain that begins after starting the antibiotic.  · You have new muscle aches that begin after starting the antibiotic.  · You had a fever before starting the antibiotic and it returns.  · You have any symptoms of an allergic reaction, such as an itchy rash. If this happens, stop taking the antibiotic.  SEEK IMMEDIATE MEDICAL CARE IF:  · Your urine turns dark or becomes blood-colored.  · Your skin turns yellow.  · You bruise or bleed easily.  · You have severe diarrhea and abdominal cramps.  · You have a severe headache.  · You have signs of a severe allergic reaction, such as:  ¨ Trouble breathing.  ¨ Wheezing.  ¨ Swelling of the lips, tongue, or face.  ¨ Fainting.  ¨ Blisters on the skin or in the mouth.  If you have signs of a  severe allergic reaction, stop taking the antibiotic right away.  This information is not intended to replace advice given to you by your health care provider. Make sure you discuss any questions you have with your health care provider.  Document Released: 08/30/2005 Document Revised: 09/07/2016 Document Reviewed: 05/04/2016  [employer] Interactive Patient Education © 2017 [employer]      Wound Care, Adult  Taking care of your wound properly can help to prevent pain and infection. It can also help your wound to heal more quickly.  How is this treated?  Wound care  · Follow instructions from your health care provider about how to take care of your wound. Make sure you:  ¨ Wash your hands with soap and water before you change the bandage (dressing). If soap and water are not available, use hand .  ¨ Change your dressing as told by your health care provider.  ¨ Leave stitches (sutures), skin glue, or adhesive strips in place. These skin closures may need to stay in place for 2 weeks or longer. If adhesive strip edges start to loosen and curl up, you may trim the loose edges. Do not remove adhesive strips completely unless your health care provider tells you to do that.  · Check your wound area every day for signs of infection. Check for:  ¨ More redness, swelling, or pain.  ¨ More fluid or blood.  ¨ Warmth.  ¨ Pus or a bad smell.  · Ask your health care provider if you should clean the wound with mild soap and water. Doing this may include:  ¨ Using a clean towel to pat the wound dry after cleaning it. Do not rub or scrub the wound.  ¨ Applying a cream or ointment. Do this only as told by your health care provider.  ¨ Covering the incision with a clean dressing.  · Ask your health care provider when you can leave the wound uncovered.  Medicines   · If you were prescribed an antibiotic medicine, cream, or ointment, take or use the antibiotic as told by your health care provider. Do not stop taking or using the  antibiotic even if your condition improves.  · Take over-the-counter and prescription medicines only as told by your health care provider. If you were prescribed pain medicine, take it at least 30 minutes before doing any wound care or as told by your health care provider.  General instructions  · Return to your normal activities as told by your health care provider. Ask your health care provider what activities are safe.  · Do not scratch or pick at the wound.  · Keep all follow-up visits as told by your health care provider. This is important.  · Eat a diet that includes protein, vitamin A, vitamin C, and other nutrient-rich foods. These help the wound heal:  ¨ Protein-rich foods include meat, dairy, beans, nuts, and other sources.  ¨ Vitamin A-rich foods include carrots and dark green, leafy vegetables.  ¨ Vitamin C-rich foods include citrus, tomatoes, and other fruits and vegetables.  ¨ Nutrient-rich foods have protein, carbohydrates, fat, vitamins, or minerals. Eat a variety of healthy foods including vegetables, fruits, and whole grains.  Contact a health care provider if:  · You received a tetanus shot and you have swelling, severe pain, redness, or bleeding at the injection site.  · Your pain is not controlled with medicine.  · You have more redness, swelling, or pain around the wound.  · You have more fluid or blood coming from the wound.  · Your wound feels warm to the touch.  · You have pus or a bad smell coming from the wound.  · You have a fever or chills.  · You are nauseous or you vomit.  · You are dizzy.  Get help right away if:  · You have a red streak going away from your wound.  · The edges of the wound open up and separate.  · Your wound is bleeding and the bleeding does not stop with gentle pressure.  · You have a rash.  · You faint.  · You have trouble breathing.  This information is not intended to replace advice given to you by your health care provider. Make sure you discuss any questions  you have with your health care provider.  Document Released: 09/26/2009 Document Revised: 08/16/2017 Document Reviewed: 07/04/2017  [employer] Interactive Patient Education © 2017 [employer]

## 2020-04-04 NOTE — PROGRESS NOTES
This RN went over discharge instructions with patient. Patient verbalized understanding, follow up appointments, and where to get medications. A bus pass was given to the patient so he could go back [facility] the homeless shelter and get to the pharmacy to get his medications. All questions answered at this time.

## 2020-04-04 NOTE — PROGRESS NOTES
Assumed care of patient 1900. A/O x4, resting comfortably, pain meds recently given. Discussed plan of care, pain plan, and call light use. Reminders around room to use call light. Needs addressed.

## 2020-04-04 NOTE — PROGRESS NOTES
Unable to schedule [facility] f/u due to no one answering [phone] ext option 3. Advised patient to call [phone] on Monday to make his own appt. Good understanding demonstrated.

## 2020-04-04 NOTE — DISCHARGE SUMMARY
Discharge Summary    CHIEF COMPLAINT ON ADMISSION  Chief Complaint   Patient presents with   • Wound Check       Reason for Admission  EMS     Admission Date  4/1/2020    CODE STATUS  Full Code    HPI & HOSPITAL COURSE  This is a 64 y.o. male here with  a wound check.  Patient has a chronic left lower extremity wound which has gotten worse over the last few days and is now draining bertha pus.  On review of the chart he has had MRSA and strep in the wound in the past.    He states he tries to keep it clean when he showers but it has been draining onto his pants.  states that he went to [facility] and had a bandage applied but it came off.    He also notes a dry hacking cough over the last few weeks.  He was admitted and his COVID19 was ruled out.also pt is noted to have chronic wound that appears clean now and pt is probably suffering from chronic colonization.  iasked ortho to see the pt and did not think there was any need for surgical intervention.however he could possibly benefit from wound vac but due to his homelessness and lack of cooperation that could be difficult to achieve.  At this time pt is [facility] and will discharge hoim with keflex 500mg tid for 7 days and he needs to f/u with wound clinic next week.      Therefore, he is discharged in fair and stable condition to home with close outpatient follow-up.    The patient met 2-midnight criteria for an inpatient stay at the time of discharge.    Discharge Date  4/4/20    FOLLOW UP ITEMS POST DISCHARGE  pcp next week  Wound clinic next week    DISCHARGE DIAGNOSES  Principal Problem:    Cellulitis of left lower extremity POA: Yes  Active Problems:    Leg wound, left POA: Yes    Dry cough POA: Yes  Resolved Problems:    * No resolved hospital problems. *      FOLLOW UP  No future appointments.  No follow-up provider specified.    MEDICATIONS ON DISCHARGE     Medication List      START taking these medications      Instructions   acetaminophen 325 MG  Tabs  Commonly known as:  TYLENOL   Take 2 Tabs by mouth every 6 hours as needed (Mild Pain; (Pain scale 1-3); Temp greater than 100.5 F).  Dose:  650 mg            Allergies  No Known Allergies    DIET  Orders Placed This Encounter   Procedures   • Diet Order Regular     Standing Status:   Standing     Number of Occurrences:   1     Order Specific Question:   Diet:     Answer:   Regular [1]       ACTIVITY  As tolerated.  Weight bearing as tolerated    CONSULTATIONS  ortho    PROCEDURES  none    LABORATORY  Lab Results   Component Value Date    SODIUM 133 (L) 04/03/2020    POTASSIUM 3.7 04/03/2020    CHLORIDE 94 (L) 04/03/2020    CO2 26 04/03/2020    GLUCOSE 91 04/03/2020    BUN 13 04/03/2020    CREATININE 0.91 04/03/2020        Lab Results   Component Value Date    WBC 5.0 04/03/2020    HEMOGLOBIN 12.8 (L) 04/03/2020    HEMATOCRIT 39.6 (L) 04/03/2020    PLATELETCT 354 04/03/2020        Total time of the discharge process exceeds 35 minutes.

## 2020-08-07 ENCOUNTER — HOSPITAL ENCOUNTER (INPATIENT)
Facility: MEDICAL CENTER | Age: 65
LOS: 269 days | DRG: 853 | End: 2021-05-03
Attending: EMERGENCY MEDICINE | Admitting: HOSPITALIST
Payer: MEDICARE

## 2020-08-07 ENCOUNTER — APPOINTMENT (OUTPATIENT)
Dept: RADIOLOGY | Facility: MEDICAL CENTER | Age: 65
DRG: 853 | End: 2020-08-07
Attending: EMERGENCY MEDICINE
Payer: MEDICARE

## 2020-08-07 DIAGNOSIS — S81.802D WOUND OF LEFT LOWER EXTREMITY, SUBSEQUENT ENCOUNTER: ICD-10-CM

## 2020-08-07 DIAGNOSIS — R53.81 DEBILITY: ICD-10-CM

## 2020-08-07 DIAGNOSIS — S81.802S WOUND OF LEFT LOWER EXTREMITY, SEQUELA: ICD-10-CM

## 2020-08-07 DIAGNOSIS — R45.1 AGITATION: ICD-10-CM

## 2020-08-07 DIAGNOSIS — F99 PSYCHIATRIC DISORDER: ICD-10-CM

## 2020-08-07 DIAGNOSIS — S81.802A WOUND OF LEFT LOWER EXTREMITY, INITIAL ENCOUNTER: ICD-10-CM

## 2020-08-07 DIAGNOSIS — Z72.0 TOBACCO ABUSE: ICD-10-CM

## 2020-08-07 PROBLEM — E87.1 HYPONATREMIA: Status: ACTIVE | Noted: 2020-08-07

## 2020-08-07 LAB
ALBUMIN SERPL BCP-MCNC: 3.9 G/DL (ref 3.2–4.9)
ALBUMIN/GLOB SERPL: 1 G/DL
ALP SERPL-CCNC: 125 U/L (ref 30–99)
ALT SERPL-CCNC: 25 U/L (ref 2–50)
AMPHET UR QL SCN: NEGATIVE
ANION GAP SERPL CALC-SCNC: 16 MMOL/L (ref 7–16)
ANISOCYTOSIS BLD QL SMEAR: ABNORMAL
APPEARANCE UR: CLEAR
AST SERPL-CCNC: 22 U/L (ref 12–45)
BACTERIA #/AREA URNS HPF: NEGATIVE /HPF
BARBITURATES UR QL SCN: NEGATIVE
BASOPHILS # BLD AUTO: 0 % (ref 0–1.8)
BASOPHILS # BLD: 0 K/UL (ref 0–0.12)
BENZODIAZ UR QL SCN: NEGATIVE
BILIRUB SERPL-MCNC: 0.6 MG/DL (ref 0.1–1.5)
BILIRUB UR QL STRIP.AUTO: NEGATIVE
BUN SERPL-MCNC: 14 MG/DL (ref 8–22)
BZE UR QL SCN: NEGATIVE
CALCIUM SERPL-MCNC: 8.7 MG/DL (ref 8.5–10.5)
CANNABINOIDS UR QL SCN: NEGATIVE
CHLORIDE SERPL-SCNC: 96 MMOL/L (ref 96–112)
CK SERPL-CCNC: 47 U/L (ref 0–154)
CO2 SERPL-SCNC: 20 MMOL/L (ref 20–33)
COLOR UR: YELLOW
COVID ORDER STATUS COVID19: NORMAL
COVID ORDER STATUS COVID19: NORMAL
CREAT SERPL-MCNC: 0.75 MG/DL (ref 0.5–1.4)
CRP SERPL HS-MCNC: 13.54 MG/DL (ref 0–0.75)
EKG IMPRESSION: NORMAL
EOSINOPHIL # BLD AUTO: 0 K/UL (ref 0–0.51)
EOSINOPHIL NFR BLD: 0 % (ref 0–6.9)
EPI CELLS #/AREA URNS HPF: NEGATIVE /HPF
ERYTHROCYTE [DISTWIDTH] IN BLOOD BY AUTOMATED COUNT: 56.4 FL (ref 35.9–50)
ERYTHROCYTE [SEDIMENTATION RATE] IN BLOOD BY WESTERGREN METHOD: 85 MM/HOUR (ref 0–20)
ETHANOL BLD-MCNC: <10.1 MG/DL (ref 0–10.1)
GLOBULIN SER CALC-MCNC: 3.9 G/DL (ref 1.9–3.5)
GLUCOSE SERPL-MCNC: 106 MG/DL (ref 65–99)
GLUCOSE UR STRIP.AUTO-MCNC: NEGATIVE MG/DL
GRAM STN SPEC: NORMAL
HCT VFR BLD AUTO: 36 % (ref 42–52)
HGB BLD-MCNC: 11.4 G/DL (ref 14–18)
HYALINE CASTS #/AREA URNS LPF: ABNORMAL /LPF
HYPOCHROMIA BLD QL SMEAR: ABNORMAL
KETONES UR STRIP.AUTO-MCNC: NEGATIVE MG/DL
LACTATE BLD-SCNC: 1.4 MMOL/L (ref 0.5–2)
LACTATE BLD-SCNC: 2.2 MMOL/L (ref 0.5–2)
LACTATE BLD-SCNC: 2.2 MMOL/L (ref 0.5–2)
LEUKOCYTE ESTERASE UR QL STRIP.AUTO: ABNORMAL
LYMPHOCYTES # BLD AUTO: 1.7 K/UL (ref 1–4.8)
LYMPHOCYTES NFR BLD: 12.2 % (ref 22–41)
MACROCYTES BLD QL SMEAR: ABNORMAL
MAGNESIUM SERPL-MCNC: 1.4 MG/DL (ref 1.5–2.5)
MANUAL DIFF BLD: NORMAL
MCH RBC QN AUTO: 28.9 PG (ref 27–33)
MCHC RBC AUTO-ENTMCNC: 31.7 G/DL (ref 33.7–35.3)
MCV RBC AUTO: 91.1 FL (ref 81.4–97.8)
METHADONE UR QL SCN: NEGATIVE
MICRO URNS: ABNORMAL
MONOCYTES # BLD AUTO: 0.72 K/UL (ref 0–0.85)
MONOCYTES NFR BLD AUTO: 5.2 % (ref 0–13.4)
MORPHOLOGY BLD-IMP: NORMAL
NEUTROPHILS # BLD AUTO: 11.48 K/UL (ref 1.82–7.42)
NEUTROPHILS NFR BLD: 82.6 % (ref 44–72)
NITRITE UR QL STRIP.AUTO: NEGATIVE
NRBC # BLD AUTO: 0 K/UL
NRBC BLD-RTO: 0 /100 WBC
OPIATES UR QL SCN: NEGATIVE
OXYCODONE UR QL SCN: NEGATIVE
PCP UR QL SCN: NEGATIVE
PH UR STRIP.AUTO: 5.5 [PH] (ref 5–8)
PHOSPHATE SERPL-MCNC: 2.9 MG/DL (ref 2.5–4.5)
PLATELET # BLD AUTO: 524 K/UL (ref 164–446)
PLATELET BLD QL SMEAR: NORMAL
PMV BLD AUTO: 9.1 FL (ref 9–12.9)
POTASSIUM SERPL-SCNC: 3.8 MMOL/L (ref 3.6–5.5)
PROCALCITONIN SERPL-MCNC: 0.09 NG/ML
PROPOXYPH UR QL SCN: NEGATIVE
PROT SERPL-MCNC: 7.8 G/DL (ref 6–8.2)
PROT UR QL STRIP: NEGATIVE MG/DL
RBC # BLD AUTO: 3.95 M/UL (ref 4.7–6.1)
RBC # URNS HPF: ABNORMAL /HPF
RBC BLD AUTO: PRESENT
RBC UR QL AUTO: ABNORMAL
SARS-COV-2 RNA RESP QL NAA+PROBE: NOTDETECTED
SIGNIFICANT IND 70042: NORMAL
SITE SITE: NORMAL
SODIUM SERPL-SCNC: 132 MMOL/L (ref 135–145)
SOURCE SOURCE: NORMAL
SP GR UR STRIP.AUTO: 1.02
SPECIMEN SOURCE: NORMAL
UROBILINOGEN UR STRIP.AUTO-MCNC: 0.2 MG/DL
WBC # BLD AUTO: 13.9 K/UL (ref 4.8–10.8)
WBC #/AREA URNS HPF: ABNORMAL /HPF

## 2020-08-07 PROCEDURE — 87070 CULTURE OTHR SPECIMN AEROBIC: CPT

## 2020-08-07 PROCEDURE — 83735 ASSAY OF MAGNESIUM: CPT

## 2020-08-07 PROCEDURE — 700105 HCHG RX REV CODE 258: Performed by: EMERGENCY MEDICINE

## 2020-08-07 PROCEDURE — 87077 CULTURE AEROBIC IDENTIFY: CPT

## 2020-08-07 PROCEDURE — 85007 BL SMEAR W/DIFF WBC COUNT: CPT

## 2020-08-07 PROCEDURE — 86140 C-REACTIVE PROTEIN: CPT

## 2020-08-07 PROCEDURE — 87147 CULTURE TYPE IMMUNOLOGIC: CPT

## 2020-08-07 PROCEDURE — 99285 EMERGENCY DEPT VISIT HI MDM: CPT

## 2020-08-07 PROCEDURE — 73552 X-RAY EXAM OF FEMUR 2/>: CPT | Mod: LT

## 2020-08-07 PROCEDURE — 84145 PROCALCITONIN (PCT): CPT

## 2020-08-07 PROCEDURE — 85027 COMPLETE CBC AUTOMATED: CPT

## 2020-08-07 PROCEDURE — 99223 1ST HOSP IP/OBS HIGH 75: CPT | Performed by: HOSPITALIST

## 2020-08-07 PROCEDURE — 36415 COLL VENOUS BLD VENIPUNCTURE: CPT

## 2020-08-07 PROCEDURE — 87186 SC STD MICRODIL/AGAR DIL: CPT

## 2020-08-07 PROCEDURE — 82550 ASSAY OF CK (CPK): CPT

## 2020-08-07 PROCEDURE — 87040 BLOOD CULTURE FOR BACTERIA: CPT | Mod: 91

## 2020-08-07 PROCEDURE — A9270 NON-COVERED ITEM OR SERVICE: HCPCS | Performed by: HOSPITALIST

## 2020-08-07 PROCEDURE — 73590 X-RAY EXAM OF LOWER LEG: CPT | Mod: LT

## 2020-08-07 PROCEDURE — 71045 X-RAY EXAM CHEST 1 VIEW: CPT

## 2020-08-07 PROCEDURE — 700105 HCHG RX REV CODE 258: Performed by: HOSPITALIST

## 2020-08-07 PROCEDURE — 700111 HCHG RX REV CODE 636 W/ 250 OVERRIDE (IP): Performed by: EMERGENCY MEDICINE

## 2020-08-07 PROCEDURE — 80053 COMPREHEN METABOLIC PANEL: CPT

## 2020-08-07 PROCEDURE — 87205 SMEAR GRAM STAIN: CPT

## 2020-08-07 PROCEDURE — 87086 URINE CULTURE/COLONY COUNT: CPT

## 2020-08-07 PROCEDURE — 96365 THER/PROPH/DIAG IV INF INIT: CPT

## 2020-08-07 PROCEDURE — HZ2ZZZZ DETOXIFICATION SERVICES FOR SUBSTANCE ABUSE TREATMENT: ICD-10-PCS | Performed by: INTERNAL MEDICINE

## 2020-08-07 PROCEDURE — 85652 RBC SED RATE AUTOMATED: CPT

## 2020-08-07 PROCEDURE — U0003 INFECTIOUS AGENT DETECTION BY NUCLEIC ACID (DNA OR RNA); SEVERE ACUTE RESPIRATORY SYNDROME CORONAVIRUS 2 (SARS-COV-2) (CORONAVIRUS DISEASE [COVID-19]), AMPLIFIED PROBE TECHNIQUE, MAKING USE OF HIGH THROUGHPUT TECHNOLOGIES AS DESCRIBED BY CMS-2020-01-R: HCPCS

## 2020-08-07 PROCEDURE — 81001 URINALYSIS AUTO W/SCOPE: CPT

## 2020-08-07 PROCEDURE — 96368 THER/DIAG CONCURRENT INF: CPT

## 2020-08-07 PROCEDURE — 96366 THER/PROPH/DIAG IV INF ADDON: CPT

## 2020-08-07 PROCEDURE — 83605 ASSAY OF LACTIC ACID: CPT | Mod: 91

## 2020-08-07 PROCEDURE — 770001 HCHG ROOM/CARE - MED/SURG/GYN PRIV*

## 2020-08-07 PROCEDURE — 700111 HCHG RX REV CODE 636 W/ 250 OVERRIDE (IP): Performed by: HOSPITALIST

## 2020-08-07 PROCEDURE — 700102 HCHG RX REV CODE 250 W/ 637 OVERRIDE(OP): Performed by: HOSPITALIST

## 2020-08-07 PROCEDURE — 93005 ELECTROCARDIOGRAM TRACING: CPT | Performed by: EMERGENCY MEDICINE

## 2020-08-07 PROCEDURE — 93971 EXTREMITY STUDY: CPT | Mod: LT

## 2020-08-07 PROCEDURE — 80307 DRUG TEST PRSMV CHEM ANLYZR: CPT

## 2020-08-07 PROCEDURE — C9803 HOPD COVID-19 SPEC COLLECT: HCPCS | Performed by: EMERGENCY MEDICINE

## 2020-08-07 PROCEDURE — 84100 ASSAY OF PHOSPHORUS: CPT

## 2020-08-07 RX ORDER — BISACODYL 10 MG
10 SUPPOSITORY, RECTAL RECTAL
Status: DISCONTINUED | OUTPATIENT
Start: 2020-08-07 | End: 2020-09-09

## 2020-08-07 RX ORDER — ONDANSETRON 4 MG/1
4 TABLET, ORALLY DISINTEGRATING ORAL EVERY 4 HOURS PRN
Status: DISCONTINUED | OUTPATIENT
Start: 2020-08-07 | End: 2020-09-10

## 2020-08-07 RX ORDER — SODIUM CHLORIDE, SODIUM LACTATE, POTASSIUM CHLORIDE, CALCIUM CHLORIDE 600; 310; 30; 20 MG/100ML; MG/100ML; MG/100ML; MG/100ML
INJECTION, SOLUTION INTRAVENOUS CONTINUOUS
Status: DISCONTINUED | OUTPATIENT
Start: 2020-08-07 | End: 2020-08-08

## 2020-08-07 RX ORDER — ACETAMINOPHEN 325 MG/1
650 TABLET ORAL EVERY 6 HOURS PRN
Status: DISCONTINUED | OUTPATIENT
Start: 2020-08-07 | End: 2020-10-17

## 2020-08-07 RX ORDER — ONDANSETRON 2 MG/ML
4 INJECTION INTRAMUSCULAR; INTRAVENOUS EVERY 4 HOURS PRN
Status: DISCONTINUED | OUTPATIENT
Start: 2020-08-07 | End: 2020-08-26

## 2020-08-07 RX ORDER — HYDROMORPHONE HYDROCHLORIDE 1 MG/ML
0.25 INJECTION, SOLUTION INTRAMUSCULAR; INTRAVENOUS; SUBCUTANEOUS
Status: DISCONTINUED | OUTPATIENT
Start: 2020-08-07 | End: 2020-08-08

## 2020-08-07 RX ORDER — OXYCODONE HYDROCHLORIDE 5 MG/1
5 TABLET ORAL
Status: DISCONTINUED | OUTPATIENT
Start: 2020-08-07 | End: 2020-09-10

## 2020-08-07 RX ORDER — SODIUM CHLORIDE, SODIUM LACTATE, POTASSIUM CHLORIDE, AND CALCIUM CHLORIDE .6; .31; .03; .02 G/100ML; G/100ML; G/100ML; G/100ML
30 INJECTION, SOLUTION INTRAVENOUS
Status: COMPLETED | OUTPATIENT
Start: 2020-08-07 | End: 2020-08-07

## 2020-08-07 RX ORDER — AMOXICILLIN 250 MG
2 CAPSULE ORAL 2 TIMES DAILY
Status: DISCONTINUED | OUTPATIENT
Start: 2020-08-07 | End: 2020-09-09

## 2020-08-07 RX ORDER — POLYETHYLENE GLYCOL 3350 17 G/17G
1 POWDER, FOR SOLUTION ORAL
Status: DISCONTINUED | OUTPATIENT
Start: 2020-08-07 | End: 2020-09-09

## 2020-08-07 RX ORDER — OXYCODONE HYDROCHLORIDE 5 MG/1
2.5 TABLET ORAL
Status: DISCONTINUED | OUTPATIENT
Start: 2020-08-07 | End: 2020-09-10

## 2020-08-07 RX ORDER — SODIUM CHLORIDE, SODIUM LACTATE, POTASSIUM CHLORIDE, AND CALCIUM CHLORIDE .6; .31; .03; .02 G/100ML; G/100ML; G/100ML; G/100ML
500 INJECTION, SOLUTION INTRAVENOUS
Status: DISCONTINUED | OUTPATIENT
Start: 2020-08-07 | End: 2020-09-09

## 2020-08-07 RX ADMIN — AMPICILLIN AND SULBACTAM 3 G: 2; 1 INJECTION, POWDER, FOR SOLUTION INTRAVENOUS at 21:44

## 2020-08-07 RX ADMIN — AMPICILLIN AND SULBACTAM 3 G: 2; 1 INJECTION, POWDER, FOR SOLUTION INTRAVENOUS at 18:37

## 2020-08-07 RX ADMIN — SODIUM CHLORIDE, POTASSIUM CHLORIDE, SODIUM LACTATE AND CALCIUM CHLORIDE 2013 ML: 600; 310; 30; 20 INJECTION, SOLUTION INTRAVENOUS at 13:32

## 2020-08-07 RX ADMIN — VANCOMYCIN HYDROCHLORIDE 1750 MG: 500 INJECTION, POWDER, LYOPHILIZED, FOR SOLUTION INTRAVENOUS at 14:30

## 2020-08-07 RX ADMIN — HYDROMORPHONE HYDROCHLORIDE 0.25 MG: 1 INJECTION, SOLUTION INTRAMUSCULAR; INTRAVENOUS; SUBCUTANEOUS at 22:11

## 2020-08-07 RX ADMIN — SODIUM CHLORIDE, POTASSIUM CHLORIDE, SODIUM LACTATE AND CALCIUM CHLORIDE: 600; 310; 30; 20 INJECTION, SOLUTION INTRAVENOUS at 18:38

## 2020-08-07 RX ADMIN — OXYCODONE HYDROCHLORIDE 5 MG: 5 TABLET ORAL at 19:48

## 2020-08-07 RX ADMIN — ACETAMINOPHEN 650 MG: 325 TABLET, FILM COATED ORAL at 19:48

## 2020-08-07 RX ADMIN — PIPERACILLIN AND TAZOBACTAM 3.38 G: 3; .375 INJECTION, POWDER, LYOPHILIZED, FOR SOLUTION INTRAVENOUS; PARENTERAL at 14:12

## 2020-08-07 ASSESSMENT — LIFESTYLE VARIABLES
HAVE PEOPLE ANNOYED YOU BY CRITICIZING YOUR DRINKING: NO
HOW MANY TIMES IN THE PAST YEAR HAVE YOU HAD 5 OR MORE DRINKS IN A DAY: 0
HAVE YOU EVER FELT YOU SHOULD CUT DOWN ON YOUR DRINKING: NO
ALCOHOL_USE: YES
DOES PATIENT WANT TO STOP DRINKING: NO
TOTAL SCORE: 0
TOTAL SCORE: 0
AVERAGE NUMBER OF DAYS PER WEEK YOU HAVE A DRINK CONTAINING ALCOHOL: 6
ON A TYPICAL DAY WHEN YOU DRINK ALCOHOL HOW MANY DRINKS DO YOU HAVE: 2
EVER HAD A DRINK FIRST THING IN THE MORNING TO STEADY YOUR NERVES TO GET RID OF A HANGOVER: NO
CONSUMPTION TOTAL: NEGATIVE
TOTAL SCORE: 0
EVER_SMOKED: YES
EVER FELT BAD OR GUILTY ABOUT YOUR DRINKING: NO

## 2020-08-07 ASSESSMENT — FIBROSIS 4 INDEX: FIB4 SCORE: 1.01

## 2020-08-07 ASSESSMENT — COGNITIVE AND FUNCTIONAL STATUS - GENERAL
TOILETING: A LITTLE
MOBILITY SCORE: 18
DRESSING REGULAR LOWER BODY CLOTHING: A LITTLE
MOVING TO AND FROM BED TO CHAIR: A LITTLE
SUGGESTED CMS G CODE MODIFIER MOBILITY: CK
WALKING IN HOSPITAL ROOM: A LITTLE
DAILY ACTIVITIY SCORE: 19
HELP NEEDED FOR BATHING: A LITTLE
CLIMB 3 TO 5 STEPS WITH RAILING: A LITTLE
SUGGESTED CMS G CODE MODIFIER DAILY ACTIVITY: CK
PERSONAL GROOMING: A LITTLE
MOVING FROM LYING ON BACK TO SITTING ON SIDE OF FLAT BED: A LITTLE
STANDING UP FROM CHAIR USING ARMS: A LITTLE
TURNING FROM BACK TO SIDE WHILE IN FLAT BAD: A LITTLE
DRESSING REGULAR UPPER BODY CLOTHING: A LITTLE

## 2020-08-07 NOTE — H&P
Hospital Medicine History & Physical Note    Date of Service  8/7/2020    Primary Care Physician  No primary care provider on file.    Consultants      Code Status  Full Code    Chief Complaint  Chief Complaint   Patient presents with   • Wound Infection     LLE. Seeping noted. Encrusted gauze and sock at site. Significant skin breakdown, redness and swelling noted.        History of Presenting Illness  64 y.o. male who presented 8/7/2020 with history of chronic left lower extremity wound for which she was hospitalized at our facility in April evaluated by orthopedic surgery at that time wound care was recommended.  His cultures at that time grew MSSA and strep.  Patient is currently homeless he reports that he lost his Medicaid card and has had no transportation to medical appointments or wound care clinic.  He was seen at [place] earlier this week received wound care and prescription for antibiotics but has not filled it.  He has noted mild drainage from his wound with associated redness and increased swelling.  He denies fever or chills.    Review of Systems  Review of Systems   All other systems reviewed and are negative.      Past Medical History   has a past medical history of Psychiatric problem, Restless leg, and Tobacco use.    Surgical History   has a past surgical history that includes ankle orif (Right).     Family History  family history includes Heart Disease in his mother; No Known Problems in his father.     Social History   reports that he has been smoking cigarettes. He has been smoking about 0.00 packs per day for the past 40.00 years. He has never used smokeless tobacco. He reports current alcohol use. He reports that he does not use drugs.    Allergies  No Known Allergies    Medications  None       Physical Exam  Temp:  [37.5 °C (99.5 °F)] 37.5 °C (99.5 °F)  Pulse:  [] 81  Resp:  [20-24] 20  BP: (139-154)/(87-95) 142/87  SpO2:  [94 %-96 %] 96 %    Physical Exam  Vitals signs and nursing  note reviewed.   Constitutional:       Appearance: He is well-developed. He is not diaphoretic.   HENT:      Head: Normocephalic and atraumatic.      Mouth/Throat:      Pharynx: No oropharyngeal exudate.   Eyes:      General: No scleral icterus.        Right eye: No discharge.         Left eye: No discharge.      Conjunctiva/sclera: Conjunctivae normal.      Pupils: Pupils are equal, round, and reactive to light.   Neck:      Musculoskeletal: Neck supple.      Vascular: No JVD.      Trachea: No tracheal deviation.   Cardiovascular:      Rate and Rhythm: Normal rate and regular rhythm.      Heart sounds: No murmur. No friction rub. No gallop.    Pulmonary:      Effort: Pulmonary effort is normal. No respiratory distress.      Breath sounds: Normal breath sounds. No stridor. No wheezing.   Chest:      Chest wall: No tenderness.   Abdominal:      General: Bowel sounds are normal. There is no distension.      Palpations: Abdomen is soft.      Tenderness: There is no abdominal tenderness. There is no rebound.   Musculoskeletal:         General: No tenderness.      Comments: Left lower extremity wound extending from mid leg to distal thigh with skin contracture around his knee  Surrounding erythema and mild serous drainage  No fluctuance   Skin:     General: Skin is warm and dry.      Nails: There is no clubbing.     Neurological:      Mental Status: He is alert and oriented to person, place, and time.      Cranial Nerves: No cranial nerve deficit.      Coordination: Coordination normal.   Psychiatric:         Behavior: Behavior normal.         Laboratory:  Recent Labs     08/07/20  1320   WBC 13.9*   RBC 3.95*   HEMOGLOBIN 11.4*   HEMATOCRIT 36.0*   MCV 91.1   MCH 28.9   MCHC 31.7*   RDW 56.4*   PLATELETCT 524*   MPV 9.1     Recent Labs     08/07/20  1320   SODIUM 132*   POTASSIUM 3.8   CHLORIDE 96   CO2 20   GLUCOSE 106*   BUN 14   CREATININE 0.75   CALCIUM 8.7     Recent Labs     08/07/20  1320   ALTSGPT 25   ASTSGOT  22   ALKPHOSPHAT 125*   TBILIRUBIN 0.6   GLUCOSE 106*         No results for input(s): NTPROBNP in the last 72 hours.      No results for input(s): TROPONINT in the last 72 hours.    Imaging:  DX-TIBIA AND FIBULA LEFT   Final Result      1.  Healed distal metaphyseal fractures of the left fibula and tibia with tibial IM layla in place.      2.  No acute fracture or dislocation.      3.  No radiopaque soft tissue foreign body.      DX-FEMUR-2+ LEFT   Final Result      1.  No radiographic evidence of acute traumatic injury left femur.      2.  Unchanged cortical thickening in the posterior aspect of the distal femoral diaphysis which may represent sequela of prior injury or infection.      3.  No soft tissue foreign body identified.      US-EXTREMITY VENOUS LOWER UNILAT LEFT   Final Result      DX-CHEST-PORTABLE (1 VIEW)   Final Result      No acute cardiopulmonary abnormality.            Assessment/Plan:  I anticipate this patient will require at least two midnights for appropriate medical management, necessitating inpatient admission.    Open wound of left lower leg- (present on admission)  Assessment & Plan  With wound infection and cellulitis    Patient will be admitted and started on IV Unasyn  Previous cultures grew MSSA and group A strep  Follow-up on repeat cultures    IV fluids for hydration  Wound care consultation  Reviewed with patient importance of having close outpatient follow-up and continued wound care for discharge will have  to assist with discharge planning and available resources as a social situation is likely the main barrier to wound healing and good long-term outcome  Lower extremity duplex negative for DVT    Flexion contracture of knee, 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)  Assessment & Plan  Canary to chronic wound    Hyponatremia  Assessment & Plan  IV hydration and monitor    Sepsis (HCC)- (present on admission)  Assessment & Plan  This is Sepsis Present on admission  SIRS criteria  identified on my evaluation include: Leukocytosis, with WBC greater than 12,000  Source is cellulitis and wound infection  Sepsis protocol initiated  Fluid resuscitation ordered per protocol  IV antibiotics as appropriate for source of sepsis  While organ dysfunction may be noted elsewhere in this problem list or in the chart, degree of organ dysfunction does not meet CMS criteria for severe sepsis      IV fluids for hydration trend lactic acid and close clinical monitoring        Pre-diabetes- (present on admission)  Assessment & Plan  Check HbA1c    Tobacco use- (present on admission)  Assessment & Plan  Patient counseled on cessation

## 2020-08-07 NOTE — ASSESSMENT & PLAN NOTE
-This is Sepsis Present on admission  SIRS criteria identified on my evaluation include: Leukocytosis, with WBC greater than 12,000   Source is cellulitis and wound infection  Sepsis protocol initiated  Fluid resuscitation ordered per protocol  IV antibiotics as appropriate for source of sepsis  While organ dysfunction may be noted elsewhere in this problem list or in the chart, degree of organ dysfunction does not meet CMS criteria for severe sepsis  -afebrile  -lactic WNL  -blood cultures negative to date  -Sepsis has resolved

## 2020-08-07 NOTE — PROGRESS NOTES
TRIAGE OFFICER ADMISSION ACCEPTANCE NOTE:    - I spoke and discussed the case with the ER physician, Dr. Haley.  - This is a 64 y.o. male with psychiatric issues, chronic left leg ulcer, who presented to the ED with seeping LLE wound which looked infected. VSS. WBC 31299. Lactate 2.2. Leg US showed no DVT, with normal venous flow. Leg XR showed unchanged cortical thickening in the posterior distal femoral diaphysis. Given zosyn and vancomycin in the ED.   - Patient now being admitted to the hospital for further management.   - Hospitalist, Dr. Sanjay Quintanilla will be admitting the patient.  - will place inpatient admission order to medical.   - Please call admitting physician for full admission orders, and cross-coverage issues on patient's arrival to the unit.

## 2020-08-07 NOTE — ASSESSMENT & PLAN NOTE
-Secondary to chronic wound in that area.  -Continue PT/OT.  -Does not seem to limit his mobility. Is frequently ambulatory.  -Continue PRN flexeril. Robaxin added 3/14/2021.

## 2020-08-07 NOTE — ASSESSMENT & PLAN NOTE
-History of MRSA.  -Poor compliance with OP wound care. Poor compliance with wound vac at times.   -Continue pain control as needed.  -LPS and wound care following - debridements and wound VAC changes per their recommendations  -Cultures repeated 12/14 - positive for Proteus species, pan sensitive.  Completed regimen of augmentin.   -3/18 wound care team reports odor, repeat wound culture so far negative  Likely resolved

## 2020-08-07 NOTE — ED TRIAGE NOTES
Bola Varela presents to triage, wearing a mask, reporting:  Chief Complaint   Patient presents with   • Wound Infection     LLE. Seeping noted. Encrusted gauze and sock at site. Significant skin breakdown, redness and swelling noted.    Pt states he was seen at [facility] several days ago and unable to fill script for abx or cleanse wound.    Pt denies any respiratory or flu like symptoms at this time.    Pt speaking in full sentences, NAD noted. Pt educated of triage process, placed back in waiting area pending room assignment.

## 2020-08-07 NOTE — ED PROVIDER NOTES
ED Provider Note    CHIEF COMPLAINT  Chief Complaint   Patient presents with   • Wound Infection     LLE. Seeping noted. Encrusted gauze and sock at site. Significant skin breakdown, redness and swelling noted.        HPI  Bola Varela is a 64 y.o. male who presents to the emergency department with complaint of left lower leg swelling as well as ulceration infection.  Patient had surgery secondary to a fall multiple years ago then recently fell resulting in infection in the medial lateral aspect of his upper lower leg.  Is been seen here multiple times for the same condition.  Seen at [facility] yesterday and discharged.  Patient states he has severe pain to his leg, swelling, slight discharge, fever.  The patient also states that he drinks alcohol daily has not had any alcohol today.  Has loss of sensation or strength of his lower extremity, chest pain, cough, cold exposure, history of DVT.  REVIEW OF SYSTEMS  Positives as above. Pertinent negatives include fever, shakes, chills, sweats, nausea, vomiting all other review of systems are negative    PAST MEDICAL HISTORY  Past Medical History:   Diagnosis Date   • Psychiatric problem    • Restless leg    • Tobacco use        FAMILY HISTORY  Noncontributory    SOCIAL HISTORY  Social History     Socioeconomic History   • Marital status: Single     Spouse name: Not on file   • Number of children: Not on file   • Years of education: Not on file   • Highest education level: Not on file   Occupational History   • Not on file   Social Needs   • Financial resource strain: Not on file   • Food insecurity     Worry: Never true     Inability: Never true   • Transportation needs     Medical: No     Non-medical: No   Tobacco Use   • Smoking status: Light Tobacco Smoker     Packs/day: 0.00     Years: 40.00     Pack years: 0.00     Types: Cigarettes   • Smokeless tobacco: Never Used   • Tobacco comment: 1 ppd until few years ago   Substance and Sexual Activity   •  "Alcohol use: Yes     Frequency: 2-4 times a month     Drinks per session: 3 or 4     Binge frequency: Never     Comment: occasional    • Drug use: No     Types: Marijuana   • Sexual activity: Not Currently     Partners: Female   Lifestyle   • Physical activity     Days per week: Not on file     Minutes per session: Not on file   • Stress: Not on file   Relationships   • Social connections     Talks on phone: Not on file     Gets together: Not on file     Attends [employer] service: Not on file     Active member of club or organization: Not on file     Attends meetings of clubs or organizations: Not on file     Relationship status: Not on file   • Intimate partner violence     Fear of current or ex partner: Not on file     Emotionally abused: Not on file     Physically abused: Not on file     Forced sexual activity: Not on file   Other Topics Concern   • Not on file   Social History Narrative   • Not on file       SURGICAL HISTORY  Past Surgical History:   Procedure Laterality Date   • ANKLE ORIF Right        CURRENT MEDICATIONS  Home Medications     Reviewed by Sarah Oliva (Pharmacy Tech) on 08/07/20 at 1327  Med List Status: Complete   Medication Last Dose Status        Patient Berhane Taking any Medications                       ALLERGIES  No Known Allergies    PHYSICAL EXAM  VITAL SIGNS: /87   Pulse 81   Temp 37.5 °C (99.5 °F) (Temporal)   Resp 20   Ht 1.803 m (5' 11\")   Wt 67.1 kg (148 lb)   SpO2 96%   BMI 20.64 kg/m²      Constitutional: Well developed, Well nourished, mild distress, Non-toxic appearance.   Eyes: PERRLA, EOMI, Conjunctiva normal, No discharge.   Cardiovascular: Normal heart rate, Normal rhythm, No murmurs, No rubs, No gallops, and intact distal pulses.   Thorax & Lungs:  No respiratory distress, no rales, no rhonchi, No wheezing, No chest wall tenderness.   Abdomen: Bowel sounds normal, Soft, No tenderness, No guarding, No rebound, No pulsatile masses.   Skin: Left lower " extremity there is extensive ulceration the medial lateral aspect of the leg.  In the lower leg he has a medial ulceration approximately 12 cm x 3 cm in the scar line from previous surgery on the lateral upper leg he has a 10 cm x 3 cm area of ulceration, erythematous lesion with slight discharge  Extremities: Edematous left lower extremity  Neurologic: Alert & oriented x 3, No focal deficits noted, acting appropriately on exam.  Psychiatric: Affect normal for clinical presentation.    Results for orders placed or performed during the hospital encounter of 08/07/20   Lactic acid (lactate)   Result Value Ref Range    Lactic Acid 2.2 (H) 0.5 - 2.0 mmol/L   Lactic acid (lactate): Repeat if initial lactic acid result is greater than 2   Result Value Ref Range    Lactic Acid 1.4 0.5 - 2.0 mmol/L   MAGNESIUM   Result Value Ref Range    Magnesium 1.4 (L) 1.5 - 2.5 mg/dL   PHOSPHORUS   Result Value Ref Range    Phosphorus 2.9 2.5 - 4.5 mg/dL   DIAGNOSTIC ALCOHOL   Result Value Ref Range    Diagnostic Alcohol <10.1 0.0 - 10.1 mg/dL   Sed Rate   Result Value Ref Range    Sed Rate Westergren 85 (H) 0 - 20 mm/hour   CRP QUANTITIVE (NON-CARDIAC)   Result Value Ref Range    Stat C-Reactive Protein 13.54 (H) 0.00 - 0.75 mg/dL   CREATINE KINASE   Result Value Ref Range    CPK Total 47 0 - 154 U/L   COVID/SARS CoV-2 PCR    Specimen: Nasopharyngeal; Respirate   Result Value Ref Range    COVID Order Status Received    CBC WITH DIFFERENTIAL   Result Value Ref Range    WBC 13.9 (H) 4.8 - 10.8 K/uL    RBC 3.95 (L) 4.70 - 6.10 M/uL    Hemoglobin 11.4 (L) 14.0 - 18.0 g/dL    Hematocrit 36.0 (L) 42.0 - 52.0 %    MCV 91.1 81.4 - 97.8 fL    MCH 28.9 27.0 - 33.0 pg    MCHC 31.7 (L) 33.7 - 35.3 g/dL    RDW 56.4 (H) 35.9 - 50.0 fL    Platelet Count 524 (H) 164 - 446 K/uL    MPV 9.1 9.0 - 12.9 fL    Neutrophils-Polys 82.60 (H) 44.00 - 72.00 %    Lymphocytes 12.20 (L) 22.00 - 41.00 %    Monocytes 5.20 0.00 - 13.40 %    Eosinophils 0.00 0.00 - 6.90  %    Basophils 0.00 0.00 - 1.80 %    Nucleated RBC 0.00 /100 WBC    Neutrophils (Absolute) 11.48 (H) 1.82 - 7.42 K/uL    Lymphs (Absolute) 1.70 1.00 - 4.80 K/uL    Monos (Absolute) 0.72 0.00 - 0.85 K/uL    Eos (Absolute) 0.00 0.00 - 0.51 K/uL    Baso (Absolute) 0.00 0.00 - 0.12 K/uL    NRBC (Absolute) 0.00 K/uL    Hypochromia 1+     Anisocytosis 1+     Macrocytosis 1+    Comp Metabolic Panel   Result Value Ref Range    Sodium 132 (L) 135 - 145 mmol/L    Potassium 3.8 3.6 - 5.5 mmol/L    Chloride 96 96 - 112 mmol/L    Co2 20 20 - 33 mmol/L    Anion Gap 16.0 7.0 - 16.0    Glucose 106 (H) 65 - 99 mg/dL    Bun 14 8 - 22 mg/dL    Creatinine 0.75 0.50 - 1.40 mg/dL    Calcium 8.7 8.5 - 10.5 mg/dL    AST(SGOT) 22 12 - 45 U/L    ALT(SGPT) 25 2 - 50 U/L    Alkaline Phosphatase 125 (H) 30 - 99 U/L    Total Bilirubin 0.6 0.1 - 1.5 mg/dL    Albumin 3.9 3.2 - 4.9 g/dL    Total Protein 7.8 6.0 - 8.2 g/dL    Globulin 3.9 (H) 1.9 - 3.5 g/dL    A-G Ratio 1.0 g/dL   DIFFERENTIAL MANUAL   Result Value Ref Range    Manual Diff Status PERFORMED    PERIPHERAL SMEAR REVIEW   Result Value Ref Range    Peripheral Smear Review see below    PLATELET ESTIMATE   Result Value Ref Range    Plt Estimation Increased    MORPHOLOGY   Result Value Ref Range    RBC Morphology Present    COVID/SARS CoV-2 PCR   Result Value Ref Range    COVID Order Status Received    ESTIMATED GFR   Result Value Ref Range    GFR If African American >60 >60 mL/min/1.73 m 2    GFR If Non African American >60 >60 mL/min/1.73 m 2   SARS-CoV-2, PCR (In-House)   Result Value Ref Range    SARS-CoV-2 Source NP Swab     SARS-CoV-2 by PCR NotDetected    EKG   Result Value Ref Range    Report       [facility] Emergency Dept.    Test Date:  2020  Pt Name:    GRUPO GANN                 Department: ER  MRN:        [id number]                      Room:        13  Gender:     Male                         Technician: [id number]  :        1955                    Requested By:LAITH DOMINIC RODRICK  Order #:    [id number]                    Reading MD:    Measurements  Intervals                                Axis  Rate:       85                           P:          33  FL:         140                          QRS:        63  QRSD:       100                          T:          35  QT:         396  QTc:        471    Interpretive Statements  SINUS RHYTHM  PROBABLE LEFT ATRIAL ABNORMALITY  ARTIFACT IN LEAD(S) I,III,aVR,aVL,aVF  Compared to ECG 10/24/2018 18:13:04  T-wave abnormality no longer present         RADIOLOGY/PROCEDURES  DX-TIBIA AND FIBULA LEFT   Final Result      1.  Healed distal metaphyseal fractures of the left fibula and tibia with tibial IM layla in place.      2.  No acute fracture or dislocation.      3.  No radiopaque soft tissue foreign body.      DX-FEMUR-2+ LEFT   Final Result      1.  No radiographic evidence of acute traumatic injury left femur.      2.  Unchanged cortical thickening in the posterior aspect of the distal femoral diaphysis which may represent sequela of prior injury or infection.      3.  No soft tissue foreign body identified.      US-EXTREMITY VENOUS LOWER UNILAT LEFT   Final Result      DX-CHEST-PORTABLE (1 VIEW)   Final Result      No acute cardiopulmonary abnormality.        COURSE & MEDICAL DECISION MAKING  Pertinent Labs & Imaging studies reviewed. (See chart for details)  This is a 64-year-old gentleman presents with significant cellulitis to the left lower extremity.  Patient does have evidence of sepsis with a lactic acid of 2.2.  Patient received IV fluids lactated Ringer's 30 mils per kilogram fluid bolus and responded appropriately normal heart rate and decreased lactic acid.  Does have evidence of significant leukocytosis as well.  X-rays negative for free air do not believe the patient is experienced necrotizing fasciitis of the lower extremity.  In addition, the patient had negative ultrasound for DVT.  The patient  received antibiotics in the form of Zosyn and vancomycin and will be hospitalized to Dr. Hatch for further evaluation and management.  The patient does not currently have a surgical condition.    I verified that the patient was wearing a mask and I was wearing appropriate PPE every time I entered the room. I donned/doffed my PPE in the correct fashion in order to reduce the risk of spread of infection. The patient's mask was on the patient at all times during my encounter except for a brief view of the oropharynx. I mostly stayed more than six feet away from the patient and when I was less than six feet from the patient I spent less than two minutes performing a physical examination.  CRITICAL CARE  The very real possibilty of a deterioration of this patient's condition required the highest level of my preparedness for sudden, emergent intervention.  I provided critical care services, which included medication orders, frequent reevaluations of the patient's condition and response to treatment, ordering and reviewing test results, and discussing the case with various consultants.  The critical care time associated with the care of the patient was 35 minutes. Review chart for interventions. This time is exclusive of any other billable procedures.     FINAL IMPRESSION  Left lower extremity cellulitis  Sepsis  Critical care time 35 minutes         Electronically signed by: Bran Haley D.O., 8/7/2020 1:12 PM

## 2020-08-07 NOTE — ED NOTES
Med rec completed   Allergies reviewed     Pt states that he is not currently taking any daily medications

## 2020-08-08 PROBLEM — F10.20 ALCOHOL DEPENDENCE (HCC): Status: ACTIVE | Noted: 2020-08-08

## 2020-08-08 PROBLEM — E46 PROTEIN MALNUTRITION (HCC): Status: ACTIVE | Noted: 2018-11-09

## 2020-08-08 PROBLEM — F99 PSYCHIATRIC DISORDER: Status: ACTIVE | Noted: 2020-08-08

## 2020-08-08 PROBLEM — F17.200 TOBACCO DEPENDENCE: Status: ACTIVE | Noted: 2017-06-08

## 2020-08-08 PROBLEM — Z91.199 NON-COMPLIANCE: Status: ACTIVE | Noted: 2020-08-08

## 2020-08-08 LAB
ANION GAP SERPL CALC-SCNC: 14 MMOL/L (ref 7–16)
BASOPHILS # BLD AUTO: 0.5 % (ref 0–1.8)
BASOPHILS # BLD: 0.04 K/UL (ref 0–0.12)
BUN SERPL-MCNC: 13 MG/DL (ref 8–22)
CALCIUM SERPL-MCNC: 8.2 MG/DL (ref 8.5–10.5)
CHLORIDE SERPL-SCNC: 101 MMOL/L (ref 96–112)
CO2 SERPL-SCNC: 20 MMOL/L (ref 20–33)
CREAT SERPL-MCNC: 0.55 MG/DL (ref 0.5–1.4)
EOSINOPHIL # BLD AUTO: 0.08 K/UL (ref 0–0.51)
EOSINOPHIL NFR BLD: 0.9 % (ref 0–6.9)
ERYTHROCYTE [DISTWIDTH] IN BLOOD BY AUTOMATED COUNT: 56.3 FL (ref 35.9–50)
ETHANOL BLD-MCNC: <10.1 MG/DL (ref 0–10.1)
GLUCOSE SERPL-MCNC: 104 MG/DL (ref 65–99)
HCT VFR BLD AUTO: 30.3 % (ref 42–52)
HGB BLD-MCNC: 9.9 G/DL (ref 14–18)
IMM GRANULOCYTES # BLD AUTO: 0.03 K/UL (ref 0–0.11)
IMM GRANULOCYTES NFR BLD AUTO: 0.3 % (ref 0–0.9)
LACTATE BLD-SCNC: 0.9 MMOL/L (ref 0.5–2)
LYMPHOCYTES # BLD AUTO: 0.73 K/UL (ref 1–4.8)
LYMPHOCYTES NFR BLD: 8.5 % (ref 22–41)
MCH RBC QN AUTO: 29.4 PG (ref 27–33)
MCHC RBC AUTO-ENTMCNC: 32.7 G/DL (ref 33.7–35.3)
MCV RBC AUTO: 89.9 FL (ref 81.4–97.8)
MONOCYTES # BLD AUTO: 0.93 K/UL (ref 0–0.85)
MONOCYTES NFR BLD AUTO: 10.8 % (ref 0–13.4)
NEUTROPHILS # BLD AUTO: 6.79 K/UL (ref 1.82–7.42)
NEUTROPHILS NFR BLD: 79 % (ref 44–72)
NRBC # BLD AUTO: 0 K/UL
NRBC BLD-RTO: 0 /100 WBC
PLATELET # BLD AUTO: 380 K/UL (ref 164–446)
PMV BLD AUTO: 9.3 FL (ref 9–12.9)
POTASSIUM SERPL-SCNC: 3.5 MMOL/L (ref 3.6–5.5)
RBC # BLD AUTO: 3.37 M/UL (ref 4.7–6.1)
SODIUM SERPL-SCNC: 135 MMOL/L (ref 135–145)
WBC # BLD AUTO: 8.6 K/UL (ref 4.8–10.8)

## 2020-08-08 PROCEDURE — 36415 COLL VENOUS BLD VENIPUNCTURE: CPT

## 2020-08-08 PROCEDURE — A9270 NON-COVERED ITEM OR SERVICE: HCPCS | Performed by: HOSPITALIST

## 2020-08-08 PROCEDURE — 85025 COMPLETE CBC W/AUTO DIFF WBC: CPT

## 2020-08-08 PROCEDURE — A9270 NON-COVERED ITEM OR SERVICE: HCPCS | Performed by: NURSE PRACTITIONER

## 2020-08-08 PROCEDURE — 700111 HCHG RX REV CODE 636 W/ 250 OVERRIDE (IP): Performed by: NURSE PRACTITIONER

## 2020-08-08 PROCEDURE — 80048 BASIC METABOLIC PNL TOTAL CA: CPT

## 2020-08-08 PROCEDURE — 83605 ASSAY OF LACTIC ACID: CPT

## 2020-08-08 PROCEDURE — 80307 DRUG TEST PRSMV CHEM ANLYZR: CPT

## 2020-08-08 PROCEDURE — 770001 HCHG ROOM/CARE - MED/SURG/GYN PRIV*

## 2020-08-08 PROCEDURE — 700111 HCHG RX REV CODE 636 W/ 250 OVERRIDE (IP): Performed by: HOSPITALIST

## 2020-08-08 PROCEDURE — 700105 HCHG RX REV CODE 258

## 2020-08-08 PROCEDURE — 99232 SBSQ HOSP IP/OBS MODERATE 35: CPT | Performed by: HOSPITALIST

## 2020-08-08 PROCEDURE — 700101 HCHG RX REV CODE 250: Performed by: NURSE PRACTITIONER

## 2020-08-08 PROCEDURE — 700105 HCHG RX REV CODE 258: Performed by: HOSPITALIST

## 2020-08-08 PROCEDURE — 700102 HCHG RX REV CODE 250 W/ 637 OVERRIDE(OP): Performed by: NURSE PRACTITIONER

## 2020-08-08 PROCEDURE — 700102 HCHG RX REV CODE 250 W/ 637 OVERRIDE(OP): Performed by: HOSPITALIST

## 2020-08-08 RX ORDER — LORAZEPAM 2 MG/ML
.5-1 INJECTION INTRAMUSCULAR EVERY 4 HOURS PRN
Status: DISCONTINUED | OUTPATIENT
Start: 2020-08-08 | End: 2020-08-08

## 2020-08-08 RX ORDER — LORAZEPAM 1 MG/1
0.5 TABLET ORAL EVERY 4 HOURS PRN
Status: DISCONTINUED | OUTPATIENT
Start: 2020-08-08 | End: 2020-08-17

## 2020-08-08 RX ORDER — LORAZEPAM 2 MG/ML
0.5 INJECTION INTRAMUSCULAR EVERY 4 HOURS PRN
Status: DISCONTINUED | OUTPATIENT
Start: 2020-08-08 | End: 2020-08-17

## 2020-08-08 RX ORDER — LORAZEPAM 1 MG/1
4 TABLET ORAL
Status: DISCONTINUED | OUTPATIENT
Start: 2020-08-08 | End: 2020-08-17

## 2020-08-08 RX ORDER — LORAZEPAM 2 MG/ML
1.5 INJECTION INTRAMUSCULAR
Status: DISCONTINUED | OUTPATIENT
Start: 2020-08-08 | End: 2020-08-17

## 2020-08-08 RX ORDER — THIAMINE MONONITRATE (VIT B1) 100 MG
100 TABLET ORAL DAILY
Status: COMPLETED | OUTPATIENT
Start: 2020-08-09 | End: 2020-08-12

## 2020-08-08 RX ORDER — LORAZEPAM 1 MG/1
1 TABLET ORAL EVERY 4 HOURS PRN
Status: DISCONTINUED | OUTPATIENT
Start: 2020-08-08 | End: 2020-08-17

## 2020-08-08 RX ORDER — FOLIC ACID 1 MG/1
1 TABLET ORAL DAILY
Status: COMPLETED | OUTPATIENT
Start: 2020-08-09 | End: 2020-08-12

## 2020-08-08 RX ORDER — LORAZEPAM 2 MG/ML
2 INJECTION INTRAMUSCULAR
Status: DISCONTINUED | OUTPATIENT
Start: 2020-08-08 | End: 2020-08-17

## 2020-08-08 RX ORDER — HALOPERIDOL 5 MG/ML
2-5 INJECTION INTRAMUSCULAR EVERY 4 HOURS PRN
Status: DISCONTINUED | OUTPATIENT
Start: 2020-08-08 | End: 2020-08-26

## 2020-08-08 RX ORDER — SODIUM CHLORIDE 9 MG/ML
INJECTION, SOLUTION INTRAVENOUS
Status: COMPLETED
Start: 2020-08-08 | End: 2020-08-08

## 2020-08-08 RX ORDER — LORAZEPAM 1 MG/1
3 TABLET ORAL
Status: DISCONTINUED | OUTPATIENT
Start: 2020-08-08 | End: 2020-08-17

## 2020-08-08 RX ORDER — MAGNESIUM SULFATE HEPTAHYDRATE 40 MG/ML
2 INJECTION, SOLUTION INTRAVENOUS ONCE
Status: COMPLETED | OUTPATIENT
Start: 2020-08-08 | End: 2020-08-08

## 2020-08-08 RX ORDER — LORAZEPAM 2 MG/ML
1 INJECTION INTRAMUSCULAR
Status: DISCONTINUED | OUTPATIENT
Start: 2020-08-08 | End: 2020-08-17

## 2020-08-08 RX ORDER — LORAZEPAM 1 MG/1
2 TABLET ORAL
Status: DISCONTINUED | OUTPATIENT
Start: 2020-08-08 | End: 2020-08-17

## 2020-08-08 RX ADMIN — MAGNESIUM SULFATE 2 G: 2 INJECTION INTRAVENOUS at 14:15

## 2020-08-08 RX ADMIN — DOCUSATE SODIUM 50 MG AND SENNOSIDES 8.6 MG 2 TABLET: 8.6; 5 TABLET, FILM COATED ORAL at 04:44

## 2020-08-08 RX ADMIN — SODIUM CHLORIDE, POTASSIUM CHLORIDE, SODIUM LACTATE AND CALCIUM CHLORIDE 1000 ML: 600; 310; 30; 20 INJECTION, SOLUTION INTRAVENOUS at 10:35

## 2020-08-08 RX ADMIN — ENOXAPARIN SODIUM 40 MG: 40 INJECTION SUBCUTANEOUS at 04:44

## 2020-08-08 RX ADMIN — SODIUM CHLORIDE 500 ML: 9 INJECTION, SOLUTION INTRAVENOUS at 17:00

## 2020-08-08 RX ADMIN — AMPICILLIN AND SULBACTAM 3 G: 2; 1 INJECTION, POWDER, FOR SOLUTION INTRAVENOUS at 10:30

## 2020-08-08 RX ADMIN — THIAMINE HYDROCHLORIDE 1000 ML: 100 INJECTION, SOLUTION INTRAMUSCULAR; INTRAVENOUS at 12:26

## 2020-08-08 RX ADMIN — OXYCODONE HYDROCHLORIDE 5 MG: 5 TABLET ORAL at 10:47

## 2020-08-08 RX ADMIN — OXYCODONE HYDROCHLORIDE 5 MG: 5 TABLET ORAL at 03:27

## 2020-08-08 RX ADMIN — LORAZEPAM 3 MG: 1 TABLET ORAL at 12:08

## 2020-08-08 RX ADMIN — AMPICILLIN AND SULBACTAM 3 G: 2; 1 INJECTION, POWDER, FOR SOLUTION INTRAVENOUS at 03:27

## 2020-08-08 RX ADMIN — AMPICILLIN AND SULBACTAM 3 G: 2; 1 INJECTION, POWDER, FOR SOLUTION INTRAVENOUS at 16:59

## 2020-08-08 RX ADMIN — ACETAMINOPHEN 650 MG: 325 TABLET, FILM COATED ORAL at 03:27

## 2020-08-08 ASSESSMENT — LIFESTYLE VARIABLES
ANXIETY: NO ANXIETY (AT EASE)
AUDITORY DISTURBANCES: NOT PRESENT
AGITATION: NORMAL ACTIVITY
TOTAL SCORE: 0
ORIENTATION AND CLOUDING OF SENSORIUM: DATE DISORIENTATION BY NO MORE THAN TWO CALENDAR DAYS
VISUAL DISTURBANCES: NOT PRESENT
VISUAL DISTURBANCES: NOT PRESENT
TREMOR: NO TREMOR
TREMOR: *
ORIENTATION AND CLOUDING OF SENSORIUM: DATE DISORIENTATION BY NO MORE THAN TWO CALENDAR DAYS
TREMOR: NO TREMOR
HEADACHE, FULLNESS IN HEAD: MODERATE
NAUSEA AND VOMITING: NO NAUSEA AND NO VOMITING
ANXIETY: *
ANXIETY: NO ANXIETY (AT EASE)
AGITATION: *
ORIENTATION AND CLOUDING OF SENSORIUM: DATE DISORIENTATION BY NO MORE THAN TWO CALENDAR DAYS
VISUAL DISTURBANCES: NOT PRESENT
NAUSEA AND VOMITING: NO NAUSEA AND NO VOMITING
VISUAL DISTURBANCES: NOT PRESENT
PAROXYSMAL SWEATS: BARELY PERCEPTIBLE SWEATING. PALMS MOIST
PAROXYSMAL SWEATS: NO SWEAT VISIBLE
TOTAL SCORE: 3
NAUSEA AND VOMITING: NO NAUSEA AND NO VOMITING
HEADACHE, FULLNESS IN HEAD: NOT PRESENT
TOTAL SCORE: 3
AUDITORY DISTURBANCES: NOT PRESENT
AUDITORY DISTURBANCES: NOT PRESENT
AGITATION: NORMAL ACTIVITY
TREMOR: NO TREMOR
PAROXYSMAL SWEATS: BARELY PERCEPTIBLE SWEATING. PALMS MOIST
NAUSEA AND VOMITING: NO NAUSEA AND NO VOMITING
ORIENTATION AND CLOUDING OF SENSORIUM: ORIENTED AND CAN DO SERIAL ADDITIONS
ANXIETY: NO ANXIETY (AT EASE)
HEADACHE, FULLNESS IN HEAD: NOT PRESENT
TOTAL SCORE: 21
HEADACHE, FULLNESS IN HEAD: NOT PRESENT
AUDITORY DISTURBANCES: NOT PRESENT
PAROXYSMAL SWEATS: BARELY PERCEPTIBLE SWEATING. PALMS MOIST
AGITATION: NORMAL ACTIVITY

## 2020-08-08 NOTE — ASSESSMENT & PLAN NOTE
-Likely also component of psychiatric disorder and ETOH abuse.  -Psychiatry consulted and suggests patient is incapacitated to make medical decisions or leave AMA.  -Continue ongoing encouragement for compliance.

## 2020-08-08 NOTE — CARE PLAN
Problem: Safety  Goal: Will remain free from injury  Outcome: PROGRESSING AS EXPECTED   Pt has treaded socks on, bed locked and in lowest position, bed alarm on, call lights and table within reach. Check on pt hourly  Problem: Pain Management  Goal: Pain level will decrease to patient's comfort goal  Outcome: PROGRESSING AS EXPECTED   Assess pain level regularly and provide appropriate interventions. Medicate for pain prior to dressing change.

## 2020-08-08 NOTE — PROGRESS NOTES
Pt agitated and anxious. He was looking for his debit card and claims that he had lost it. He began screaming and started hitting himself. No visual, tactile, or auditory hallucinations per pt but reports headache. Pt also having tremors. MD and RAFITA witnessed the event as it happened.

## 2020-08-08 NOTE — PROGRESS NOTES
2 RN Skin Check    2 RN skin check complete.   Devices in place: N/A.  Skin assessed under devices: no.  Confirmed pressure ulcers found on: N/A.  New potential pressure ulcers noted on N/A. Wound consult placed Yes.  The following interventions in place: dressings initiated.     Full thickness found on admission of the left inner thigh, and behind the knee. Wound consult placed, and dressing has been initiated.   Abrasion to back and sacrum area, skin in tact and blanching.

## 2020-08-08 NOTE — PSYCHIATRY
PSYCHOLOGY BRIEF NOTE: Attempted to see the patient today however he was sleeping soundly. Will try again tomorrow.

## 2020-08-08 NOTE — ASSESSMENT & PLAN NOTE
-Unspecified.  -Continue Risperdal and Depakote.   -Psychiatry was consulted and deemed him incapacitated to leave AMA or make medical decisions.  -SLP repeated cognitive evaluation in Jan 2021- continues to recommend 24/7 supervision.   -Guardianship established  - at discharge.

## 2020-08-08 NOTE — PSYCHIATRY
PSYCHIATRY CONSULT TEAM NOTE: Consult received. Patient will be assessed within 24 hours.     Please note that psychiatric medication management services are unavailable 8/8/2020 and 8/9/2020.    Thank you.

## 2020-08-08 NOTE — ASSESSMENT & PLAN NOTE
-Body mass index is 21.53 kg/m².-Slightly improving.   -Continue TID supplements.   -Encourage PO intake.

## 2020-08-09 ENCOUNTER — APPOINTMENT (OUTPATIENT)
Dept: RADIOLOGY | Facility: MEDICAL CENTER | Age: 65
DRG: 853 | End: 2020-08-09
Attending: NURSE PRACTITIONER
Payer: MEDICARE

## 2020-08-09 PROBLEM — G93.40 ENCEPHALOPATHY: Status: ACTIVE | Noted: 2020-08-09

## 2020-08-09 PROBLEM — E87.1 HYPONATREMIA: Status: RESOLVED | Noted: 2020-08-07 | Resolved: 2020-08-09

## 2020-08-09 LAB
ANION GAP SERPL CALC-SCNC: 13 MMOL/L (ref 7–16)
BACTERIA UR CULT: NORMAL
BASOPHILS # BLD AUTO: 0.6 % (ref 0–1.8)
BASOPHILS # BLD: 0.04 K/UL (ref 0–0.12)
BUN SERPL-MCNC: 8 MG/DL (ref 8–22)
CALCIUM SERPL-MCNC: 8.2 MG/DL (ref 8.5–10.5)
CHLORIDE SERPL-SCNC: 102 MMOL/L (ref 96–112)
CO2 SERPL-SCNC: 22 MMOL/L (ref 20–33)
CREAT SERPL-MCNC: 0.64 MG/DL (ref 0.5–1.4)
EOSINOPHIL # BLD AUTO: 0.16 K/UL (ref 0–0.51)
EOSINOPHIL NFR BLD: 2.4 % (ref 0–6.9)
ERYTHROCYTE [DISTWIDTH] IN BLOOD BY AUTOMATED COUNT: 56.7 FL (ref 35.9–50)
GLUCOSE SERPL-MCNC: 120 MG/DL (ref 65–99)
HCT VFR BLD AUTO: 31.7 % (ref 42–52)
HGB BLD-MCNC: 10.4 G/DL (ref 14–18)
IMM GRANULOCYTES # BLD AUTO: 0.03 K/UL (ref 0–0.11)
IMM GRANULOCYTES NFR BLD AUTO: 0.5 % (ref 0–0.9)
LYMPHOCYTES # BLD AUTO: 1.41 K/UL (ref 1–4.8)
LYMPHOCYTES NFR BLD: 21.3 % (ref 22–41)
MAGNESIUM SERPL-MCNC: 1.7 MG/DL (ref 1.5–2.5)
MCH RBC QN AUTO: 29.9 PG (ref 27–33)
MCHC RBC AUTO-ENTMCNC: 32.8 G/DL (ref 33.7–35.3)
MCV RBC AUTO: 91.1 FL (ref 81.4–97.8)
MONOCYTES # BLD AUTO: 0.72 K/UL (ref 0–0.85)
MONOCYTES NFR BLD AUTO: 10.9 % (ref 0–13.4)
NEUTROPHILS # BLD AUTO: 4.26 K/UL (ref 1.82–7.42)
NEUTROPHILS NFR BLD: 64.3 % (ref 44–72)
NRBC # BLD AUTO: 0 K/UL
NRBC BLD-RTO: 0 /100 WBC
PLATELET # BLD AUTO: 383 K/UL (ref 164–446)
PMV BLD AUTO: 9 FL (ref 9–12.9)
POTASSIUM SERPL-SCNC: 3.5 MMOL/L (ref 3.6–5.5)
RBC # BLD AUTO: 3.48 M/UL (ref 4.7–6.1)
SIGNIFICANT IND 70042: NORMAL
SITE SITE: NORMAL
SODIUM SERPL-SCNC: 137 MMOL/L (ref 135–145)
SOURCE SOURCE: NORMAL
WBC # BLD AUTO: 6.6 K/UL (ref 4.8–10.8)

## 2020-08-09 PROCEDURE — 99232 SBSQ HOSP IP/OBS MODERATE 35: CPT | Performed by: HOSPITALIST

## 2020-08-09 PROCEDURE — 700111 HCHG RX REV CODE 636 W/ 250 OVERRIDE (IP): Performed by: HOSPITALIST

## 2020-08-09 PROCEDURE — 97165 OT EVAL LOW COMPLEX 30 MIN: CPT

## 2020-08-09 PROCEDURE — 700105 HCHG RX REV CODE 258: Performed by: HOSPITALIST

## 2020-08-09 PROCEDURE — 80048 BASIC METABOLIC PNL TOTAL CA: CPT

## 2020-08-09 PROCEDURE — 36415 COLL VENOUS BLD VENIPUNCTURE: CPT

## 2020-08-09 PROCEDURE — 770001 HCHG ROOM/CARE - MED/SURG/GYN PRIV*

## 2020-08-09 PROCEDURE — A9270 NON-COVERED ITEM OR SERVICE: HCPCS | Performed by: NURSE PRACTITIONER

## 2020-08-09 PROCEDURE — 83735 ASSAY OF MAGNESIUM: CPT

## 2020-08-09 PROCEDURE — A9270 NON-COVERED ITEM OR SERVICE: HCPCS | Performed by: HOSPITALIST

## 2020-08-09 PROCEDURE — 90791 PSYCH DIAGNOSTIC EVALUATION: CPT | Performed by: PSYCHOLOGIST

## 2020-08-09 PROCEDURE — 70450 CT HEAD/BRAIN W/O DYE: CPT | Mod: ME

## 2020-08-09 PROCEDURE — 700111 HCHG RX REV CODE 636 W/ 250 OVERRIDE (IP): Performed by: NURSE PRACTITIONER

## 2020-08-09 PROCEDURE — 85025 COMPLETE CBC W/AUTO DIFF WBC: CPT

## 2020-08-09 PROCEDURE — 700102 HCHG RX REV CODE 250 W/ 637 OVERRIDE(OP): Performed by: NURSE PRACTITIONER

## 2020-08-09 PROCEDURE — 700102 HCHG RX REV CODE 250 W/ 637 OVERRIDE(OP): Performed by: HOSPITALIST

## 2020-08-09 RX ORDER — LABETALOL HYDROCHLORIDE 5 MG/ML
10 INJECTION, SOLUTION INTRAVENOUS EVERY 6 HOURS PRN
Status: DISCONTINUED | OUTPATIENT
Start: 2020-08-09 | End: 2020-09-09

## 2020-08-09 RX ORDER — MAGNESIUM SULFATE HEPTAHYDRATE 40 MG/ML
2 INJECTION, SOLUTION INTRAVENOUS ONCE
Status: COMPLETED | OUTPATIENT
Start: 2020-08-09 | End: 2020-08-09

## 2020-08-09 RX ORDER — POTASSIUM CHLORIDE 20 MEQ/1
40 TABLET, EXTENDED RELEASE ORAL ONCE
Status: COMPLETED | OUTPATIENT
Start: 2020-08-09 | End: 2020-08-09

## 2020-08-09 RX ORDER — AMLODIPINE BESYLATE 5 MG/1
5 TABLET ORAL
Status: DISCONTINUED | OUTPATIENT
Start: 2020-08-09 | End: 2021-05-03 | Stop reason: HOSPADM

## 2020-08-09 RX ADMIN — LORAZEPAM 2 MG: 1 TABLET ORAL at 23:44

## 2020-08-09 RX ADMIN — AMPICILLIN AND SULBACTAM 3 G: 2; 1 INJECTION, POWDER, FOR SOLUTION INTRAVENOUS at 04:16

## 2020-08-09 RX ADMIN — AMPICILLIN AND SULBACTAM 3 G: 2; 1 INJECTION, POWDER, FOR SOLUTION INTRAVENOUS at 00:30

## 2020-08-09 RX ADMIN — ENOXAPARIN SODIUM 40 MG: 40 INJECTION SUBCUTANEOUS at 04:17

## 2020-08-09 RX ADMIN — LORAZEPAM 3 MG: 1 TABLET ORAL at 11:34

## 2020-08-09 RX ADMIN — LORAZEPAM 3 MG: 1 TABLET ORAL at 16:23

## 2020-08-09 RX ADMIN — LORAZEPAM 1 MG: 1 TABLET ORAL at 04:18

## 2020-08-09 RX ADMIN — LORAZEPAM 2 MG: 1 TABLET ORAL at 21:30

## 2020-08-09 RX ADMIN — OXYCODONE HYDROCHLORIDE 5 MG: 5 TABLET ORAL at 23:45

## 2020-08-09 RX ADMIN — MAGNESIUM SULFATE 2 G: 2 INJECTION INTRAVENOUS at 08:52

## 2020-08-09 RX ADMIN — OXYCODONE HYDROCHLORIDE 5 MG: 5 TABLET ORAL at 16:23

## 2020-08-09 RX ADMIN — LORAZEPAM 3 MG: 1 TABLET ORAL at 19:17

## 2020-08-09 RX ADMIN — AMPICILLIN AND SULBACTAM 3 G: 2; 1 INJECTION, POWDER, FOR SOLUTION INTRAVENOUS at 23:47

## 2020-08-09 RX ADMIN — Medication 100 MG: at 04:17

## 2020-08-09 RX ADMIN — AMLODIPINE BESYLATE 5 MG: 5 TABLET ORAL at 21:30

## 2020-08-09 RX ADMIN — THERA TABS 1 TABLET: TAB at 04:17

## 2020-08-09 RX ADMIN — LORAZEPAM 1 MG: 2 INJECTION INTRAMUSCULAR; INTRAVENOUS at 01:18

## 2020-08-09 RX ADMIN — DOCUSATE SODIUM 50 MG AND SENNOSIDES 8.6 MG 2 TABLET: 8.6; 5 TABLET, FILM COATED ORAL at 16:23

## 2020-08-09 RX ADMIN — LORAZEPAM 1 MG: 1 TABLET ORAL at 08:41

## 2020-08-09 RX ADMIN — DOCUSATE SODIUM 50 MG AND SENNOSIDES 8.6 MG 2 TABLET: 8.6; 5 TABLET, FILM COATED ORAL at 04:17

## 2020-08-09 RX ADMIN — POTASSIUM CHLORIDE 40 MEQ: 1500 TABLET, EXTENDED RELEASE ORAL at 08:41

## 2020-08-09 RX ADMIN — OXYCODONE HYDROCHLORIDE 5 MG: 5 TABLET ORAL at 19:17

## 2020-08-09 RX ADMIN — AMPICILLIN AND SULBACTAM 3 G: 2; 1 INJECTION, POWDER, FOR SOLUTION INTRAVENOUS at 12:28

## 2020-08-09 RX ADMIN — Medication 1 MG: at 04:17

## 2020-08-09 RX ADMIN — LORAZEPAM 3 MG: 1 TABLET ORAL at 20:24

## 2020-08-09 RX ADMIN — OXYCODONE HYDROCHLORIDE 5 MG: 5 TABLET ORAL at 08:41

## 2020-08-09 ASSESSMENT — LIFESTYLE VARIABLES
PAROXYSMAL SWEATS: *
TREMOR: *
AUDITORY DISTURBANCES: NOT PRESENT
AGITATION: MODERATELY FIDGETY AND RESTLESS
VISUAL DISTURBANCES: NOT PRESENT
ANXIETY: NO ANXIETY (AT EASE)
AUDITORY DISTURBANCES: NOT PRESENT
VISUAL DISTURBANCES: NOT PRESENT
AGITATION: *
NAUSEA AND VOMITING: NO NAUSEA AND NO VOMITING
TREMOR: *
TOTAL SCORE: 14
PAROXYSMAL SWEATS: *
HEADACHE, FULLNESS IN HEAD: NOT PRESENT
ORIENTATION AND CLOUDING OF SENSORIUM: ORIENTED AND CAN DO SERIAL ADDITIONS
NAUSEA AND VOMITING: NO NAUSEA AND NO VOMITING
AUDITORY DISTURBANCES: NOT PRESENT
TOTAL SCORE: MILD ITCHING, PINS AND NEEDLES SENSATION, BURNING OR NUMBNESS
VISUAL DISTURBANCES: NOT PRESENT
HEADACHE, FULLNESS IN HEAD: VERY MILD
TOTAL SCORE: 17
TOTAL SCORE: VERY MILD ITCHING, PINS AND NEEDLES SENSATION, BURNING OR NUMBNESS
VISUAL DISTURBANCES: NOT PRESENT
HEADACHE, FULLNESS IN HEAD: NOT PRESENT
TOTAL SCORE: MILD ITCHING, PINS AND NEEDLES SENSATION, BURNING OR NUMBNESS
HEADACHE, FULLNESS IN HEAD: NOT PRESENT
PAROXYSMAL SWEATS: BARELY PERCEPTIBLE SWEATING. PALMS MOIST
NAUSEA AND VOMITING: NO NAUSEA AND NO VOMITING
TOTAL SCORE: MILD ITCHING, PINS AND NEEDLES SENSATION, BURNING OR NUMBNESS
NAUSEA AND VOMITING: NO NAUSEA AND NO VOMITING
VISUAL DISTURBANCES: NOT PRESENT
ANXIETY: *
ORIENTATION AND CLOUDING OF SENSORIUM: ORIENTED AND CAN DO SERIAL ADDITIONS
TOTAL SCORE: 10
TREMOR: *
ANXIETY: *
VISUAL DISTURBANCES: NOT PRESENT
ORIENTATION AND CLOUDING OF SENSORIUM: ORIENTED AND CAN DO SERIAL ADDITIONS
PAROXYSMAL SWEATS: BARELY PERCEPTIBLE SWEATING. PALMS MOIST
PAROXYSMAL SWEATS: *
AGITATION: SOMEWHAT MORE THAN NORMAL ACTIVITY
TOTAL SCORE: VERY MILD ITCHING, PINS AND NEEDLES SENSATION, BURNING OR NUMBNESS
TREMOR: *
AGITATION: *
TREMOR: *
HEADACHE, FULLNESS IN HEAD: VERY MILD
ORIENTATION AND CLOUDING OF SENSORIUM: DATE DISORIENTATION BY MORE THAN TWO CALENDAR DAYS
NAUSEA AND VOMITING: NO NAUSEA AND NO VOMITING
AGITATION: NORMAL ACTIVITY
ORIENTATION AND CLOUDING OF SENSORIUM: DATE DISORIENTATION BY MORE THAN TWO CALENDAR DAYS
NAUSEA AND VOMITING: NO NAUSEA AND NO VOMITING
TREMOR: *
AUDITORY DISTURBANCES: NOT PRESENT
HEADACHE, FULLNESS IN HEAD: NOT PRESENT
PAROXYSMAL SWEATS: *
VISUAL DISTURBANCES: NOT PRESENT
TOTAL SCORE: MILD ITCHING, PINS AND NEEDLES SENSATION, BURNING OR NUMBNESS
ANXIETY: MILDLY ANXIOUS
TOTAL SCORE: 16
TOTAL SCORE: 8
TREMOR: *
ANXIETY: MILDLY ANXIOUS
ORIENTATION AND CLOUDING OF SENSORIUM: DATE DISORIENTATION BY NO MORE THAN TWO CALENDAR DAYS
PAROXYSMAL SWEATS: BARELY PERCEPTIBLE SWEATING. PALMS MOIST
AGITATION: *
PAROXYSMAL SWEATS: BARELY PERCEPTIBLE SWEATING. PALMS MOIST
AGITATION: *
AUDITORY DISTURBANCES: NOT PRESENT
TREMOR: *
TOTAL SCORE: 21
ANXIETY: MODERATELY ANXIOUS OR GUARDED, SO ANXIETY IS INFERRED
ANXIETY: MODERATELY ANXIOUS OR GUARDED, SO ANXIETY IS INFERRED
HEADACHE, FULLNESS IN HEAD: NOT PRESENT
AGITATION: *
TOTAL SCORE: MILD ITCHING, PINS AND NEEDLES SENSATION, BURNING OR NUMBNESS
TOTAL SCORE: 6
ORIENTATION AND CLOUDING OF SENSORIUM: DATE DISORIENTATION BY NO MORE THAN TWO CALENDAR DAYS
ORIENTATION AND CLOUDING OF SENSORIUM: DATE DISORIENTATION BY MORE THAN TWO CALENDAR DAYS
AUDITORY DISTURBANCES: NOT PRESENT
ANXIETY: MILDLY ANXIOUS
TREMOR: *
VISUAL DISTURBANCES: NOT PRESENT
AUDITORY DISTURBANCES: NOT PRESENT
TOTAL SCORE: 11
TOTAL SCORE: VERY MILD ITCHING, PINS AND NEEDLES SENSATION, BURNING OR NUMBNESS
VISUAL DISTURBANCES: NOT PRESENT
TOTAL SCORE: 11
HEADACHE, FULLNESS IN HEAD: MILD
AUDITORY DISTURBANCES: NOT PRESENT
NAUSEA AND VOMITING: NO NAUSEA AND NO VOMITING
PAROXYSMAL SWEATS: BARELY PERCEPTIBLE SWEATING. PALMS MOIST
AUDITORY DISTURBANCES: NOT PRESENT
ORIENTATION AND CLOUDING OF SENSORIUM: DATE DISORIENTATION BY NO MORE THAN TWO CALENDAR DAYS
HEADACHE, FULLNESS IN HEAD: NOT PRESENT
ANXIETY: MODERATELY ANXIOUS OR GUARDED, SO ANXIETY IS INFERRED
AGITATION: MODERATELY FIDGETY AND RESTLESS

## 2020-08-09 ASSESSMENT — COGNITIVE AND FUNCTIONAL STATUS - GENERAL
HELP NEEDED FOR BATHING: A LITTLE
SUGGESTED CMS G CODE MODIFIER DAILY ACTIVITY: CI
DAILY ACTIVITIY SCORE: 23

## 2020-08-09 ASSESSMENT — ACTIVITIES OF DAILY LIVING (ADL): TOILETING: INDEPENDENT

## 2020-08-09 NOTE — ASSESSMENT & PLAN NOTE
-intermittently symptomatic.  Somewhat confused,  agitated.  Poor memory.    -Labs within normal limits. Clinical picture does not indicate infection.  -Stat CT head obtained and was negative.  -MRI within normal limits  -Depakote level low.  -No recent changes to medications.

## 2020-08-09 NOTE — PSYCHIATRY
"PSYCHOLOGICAL CONSULTATION:  Reason for admission: Unspecified open wound, left lower leg, initial encounter  Reason for consult: capacity evaluation for medical and discharge decision making. Capacity to make the decision to leave AMA    Requesting Physician: RAFITA Owen    Legal status: Legal Status: N/A    Chief Complaint: \"I dont know.\"    HPI: Bola Varela is a 64 y.o. male with a psychiatric history of alcohol use disorder, severe who presented to the hospital BIB self with a wound infection. He was last evaluated by this writer on 11/13/2019 for the same reasons as today. At that time, he was found to be incapacitated to make medical decisions and the decision to leave AMA. He was also noted to be impulsive and in need of a safety sitter. Per chart review, on 11/17/2020, he eloped from the hospital after being told he was going to be discharged to the shelter.     Today, the patient presented laying in bed and eating lunch. Affect was euthymic. Mood was stated as \"ok.\" His orientation waxed and waned during the evaluation. At first, he was oriented to his name, city, the place, year, and the month. He was not oriented to his age, stating he was in his 50s. At the end of the evaluation, he was disoriented to place asking his nurse where he was. The patient was unable to explain his medical conditions and what his treatment team has recommended. He did know something was wrong with his leg stating he had a rash and pointing to behind his knee when asked where he felt pain. He did not seem to know he had a leg wound or why his leg was bandaged. When asked what his medical team has recommended for his treatment, he stated, \"I dont know.\"     The patient was also unable to explain to this writer how he would obtain shelter and money. He did report that he was currently staying at the [facility]'s temporary shelter however when asked where he would stay once that shelter closed down, he " "stated, \"I dont know.\" He was also unable to reason out different shelter options. When asked where he would obtain money, he stated that he has social security however it is \"messed up.\" He was unable to explain how to rectify whatever is \"messed up\" with his social security.     At the end of the session, the patient was seen impulsively getting out of bed and needing redirection by nursing. It was at this time that the patient asked where he was and why he was in the hospital.     Risk Assessment: current symptoms as reported by pt.  Suicidal Thoughts: Denies  Plan to Commit Suicide: Denies  Intent to Commit Suicide: Denies  History of Suicidal Thoughts: Denies  History of Suicide Attempts: Denies    Homicidal Thoughts: Denies  Plan to Hurt Others: Denies  Intent to Hurt Others: Denies  History of Homicidal Thoughts or Actions: Denies    Self-Harm Thoughts: Denies  Self-Harm Actions: Denies    Psychiatric Review of Systems:current symptoms as reported by pt.  Depression: Denies   Blanquita: Denies   Anxiety/Panic Attacks: Denies   PTSD symptom: Denies   Psychosis: Denies   Other: endorses alcohol use       Medical Review of Systems: as reported by pt. All systems reviewed. Only those found to be + are noted below. All others are negative.   Neurological:    TBIs: Did not report, unclear however he does have encephalopathy on his problem list   SZs:Did not report, nothing on problem list   Strokes:Did not report, nothing on problem list     Other medical symptoms:   Thyroid:Did not report, nothing on problem list   Diabetes: Did not report, pre-diabetes on problem list   Cardiovascular disease: Did not report, nothing on problem list    Past Psychiatric Hx: Extensive history of severe alcohol abuse. Treated at [facility] ED many times for alcohol intoxication and detox.    Family Psychiatric Hx: Did not report    Social Hx:   Social History     Socioeconomic History   • Marital status: Single     Spouse name: Not on file "   • Number of children: Not on file   • Years of education: Not on file   • Highest education level: Not on file   Occupational History   • Not on file   Social Needs   • Financial resource strain: Not on file   • Food insecurity     Worry: Never true     Inability: Never true   • Transportation needs     Medical: No     Non-medical: No   Tobacco Use   • Smoking status: Light Tobacco Smoker     Packs/day: 0.00     Years: 40.00     Pack years: 0.00     Types: Cigarettes   • Smokeless tobacco: Never Used   • Tobacco comment: 1 ppd until few years ago   Substance and Sexual Activity   • Alcohol use: Yes     Frequency: 2-4 times a month     Drinks per session: 3 or 4     Binge frequency: Never     Comment: occasional    • Drug use: No     Types: Marijuana   • Sexual activity: Not Currently     Partners: Female   Lifestyle   • Physical activity     Days per week: Not on file     Minutes per session: Not on file   • Stress: Not on file   Relationships   • Social connections     Talks on phone: Not on file     Gets together: Not on file     Attends Scientologist service: Not on file     Active member of club or organization: Not on file     Attends meetings of clubs or organizations: Not on file     Relationship status: Not on file   • Intimate partner violence     Fear of current or ex partner: Not on file     Emotionally abused: Not on file     Physically abused: Not on file     Forced sexual activity: Not on file   Other Topics Concern   • Not on file   Social History Narrative   • Not on file        Drug/Alcohol/Tobacco Hx:   Drugs: Denies   Prescription Medication Abuse: Denies   Alcohol: Endorses   Tobacco: Endorses    Medical Hx: Chart reviewed. Only those findings of potential interest to psychiatry are noted below:  Past Medical History:   Diagnosis Date   • Psychiatric problem    • Restless leg    • Tobacco use      Medical Conditions:     Allergies: Patient has no known allergies.  Medications (currently prescribed  at [facility]):    Current Facility-Administered Medications:   •  haloperidol lactate (HALDOL) injection 2-5 mg, 2-5 mg, Intravenous, Q4HRS PRN, CARLTON Khan.P.R.N.  •  LORazepam (ATIVAN) tablet 0.5 mg, 0.5 mg, Oral, Q4HRS PRN, CARLTON Khan.P.R.N.  •  LORazepam (ATIVAN) tablet 1 mg, 1 mg, Oral, Q4HRS PRN, 1 mg at 08/09/20 0841 **OR** LORazepam (ATIVAN) injection 0.5 mg, 0.5 mg, Intravenous, Q4HRS PRN, CARLTON Khan.P.R.N.  •  LORazepam (ATIVAN) tablet 2 mg, 2 mg, Oral, Q2HRS PRN **OR** LORazepam (ATIVAN) injection 1 mg, 1 mg, Intravenous, Q2HRS PRN, CARLTON Khan.P.R.N., 1 mg at 08/09/20 0118  •  LORazepam (ATIVAN) tablet 3 mg, 3 mg, Oral, Q HOUR PRN, 3 mg at 08/09/20 1134 **OR** LORazepam (ATIVAN) injection 1.5 mg, 1.5 mg, Intravenous, Q HOUR PRN, CARLTON Khan.P.R.N.  •  LORazepam (ATIVAN) tablet 4 mg, 4 mg, Oral, Q15 MIN PRN **OR** LORazepam (ATIVAN) injection 2 mg, 2 mg, Intravenous, Q15 MIN PRN, CARLTON Khan.P.R.N.  •  [COMPLETED] detox IV 1000 mL (D5LR + magnesium 1 g + thiamine 100 mg + folic acid 1 mg) infusion, , Intravenous, Once, Last Rate: 250 mL/hr at 08/08/20 1226, 1,000 mL at 08/08/20 1226 **AND** thiamine tablet 100 mg, 100 mg, Oral, DAILY, 100 mg at 08/09/20 0417 **AND** multivitamin (THERAGRAN) tablet 1 Tab, 1 Tab, Oral, DAILY, 1 Tab at 08/09/20 0417 **AND** folic acid (FOLVITE) tablet 1 mg, 1 mg, Oral, DAILY, FELIPE Khan, 1 mg at 08/09/20 0417  •  senna-docusate (PERICOLACE or SENOKOT S) 8.6-50 MG per tablet 2 Tab, 2 Tab, Oral, BID, 2 Tab at 08/09/20 0417 **AND** polyethylene glycol/lytes (MIRALAX) PACKET 1 Packet, 1 Packet, Oral, QDAY PRN **AND** magnesium hydroxide (MILK OF MAGNESIA) suspension 30 mL, 30 mL, Oral, QDAY PRN **AND** bisacodyl (DULCOLAX) suppository 10 mg, 10 mg, Rectal, QDAY PRN, London Alvarado M.D.  •  lactated ringers infusion (BOLUS), 500 mL, Intravenous, Once PRN, London Alvarado M.D.  •  enoxaparin (LOVENOX) inj 40 mg, 40 mg,  Subcutaneous, DAILY, London Alvarado M.D., 40 mg at 08/09/20 0417  •  acetaminophen (TYLENOL) tablet 650 mg, 650 mg, Oral, Q6HRS PRN, London Alvraado M.D., 650 mg at 08/08/20 0327  •  Notify provider if pain remains uncontrolled, , , CONTINUOUS **AND** Use the numeric rating scale (NRS-11) on regular floors and Critical-Care Pain Observation Tool (CPOT) on ICUs/Trauma to assess pain, , , CONTINUOUS **AND** Pulse Ox (Oximetry), , , CONTINUOUS **AND** Pharmacy Consult Request ...Pain Management Review 1 Each, 1 Each, Other, PHARMACY TO DOSE **AND** If patient difficult to arouse and/or has respiratory depression, stop any opiates that are currently infusing and call a Rapid Response., , , CONTINUOUS **AND** oxyCODONE immediate-release (ROXICODONE) tablet 2.5 mg, 2.5 mg, Oral, Q3HRS PRN **AND** oxyCODONE immediate-release (ROXICODONE) tablet 5 mg, 5 mg, Oral, Q3HRS PRN, 5 mg at 08/09/20 0841 **AND** [DISCONTINUED] HYDROmorphone pf (DILAUDID) injection 0.25 mg, 0.25 mg, Intravenous, Q3HRS PRN, London Alvarado M.D., 0.25 mg at 08/07/20 2211  •  ampicillin/sulbactam (UNASYN) 3 g in  mL IVPB, 3 g, Intravenous, Q6HR, London Alvarado M.D., Last Rate: 200 mL/hr at 08/09/20 1228, 3 g at 08/09/20 1228  •  ondansetron (ZOFRAN) syringe/vial injection 4 mg, 4 mg, Intravenous, Q4HRS PRN, London Alvarado M.D.  •  ondansetron (ZOFRAN ODT) dispertab 4 mg, 4 mg, Oral, Q4HRS PRN, London F Sanjay Merhi, M.D.  Labs:  Recent Results (from the past 24 hour(s))   CBC WITH DIFFERENTIAL    Collection Time: 08/09/20  1:22 AM   Result Value Ref Range    WBC 6.6 4.8 - 10.8 K/uL    RBC 3.48 (L) 4.70 - 6.10 M/uL    Hemoglobin 10.4 (L) 14.0 - 18.0 g/dL    Hematocrit 31.7 (L) 42.0 - 52.0 %    MCV 91.1 81.4 - 97.8 fL    MCH 29.9 27.0 - 33.0 pg    MCHC 32.8 (L) 33.7 - 35.3 g/dL    RDW 56.7 (H) 35.9 - 50.0 fL    Platelet Count 383 164 - 446 K/uL    MPV 9.0 9.0 - 12.9 fL    Neutrophils-Polys 64.30 44.00 - 72.00 %     "Lymphocytes 21.30 (L) 22.00 - 41.00 %    Monocytes 10.90 0.00 - 13.40 %    Eosinophils 2.40 0.00 - 6.90 %    Basophils 0.60 0.00 - 1.80 %    Immature Granulocytes 0.50 0.00 - 0.90 %    Nucleated RBC 0.00 /100 WBC    Neutrophils (Absolute) 4.26 1.82 - 7.42 K/uL    Lymphs (Absolute) 1.41 1.00 - 4.80 K/uL    Monos (Absolute) 0.72 0.00 - 0.85 K/uL    Eos (Absolute) 0.16 0.00 - 0.51 K/uL    Baso (Absolute) 0.04 0.00 - 0.12 K/uL    Immature Granulocytes (abs) 0.03 0.00 - 0.11 K/uL    NRBC (Absolute) 0.00 K/uL   Basic Metabolic Panel    Collection Time: 08/09/20  1:22 AM   Result Value Ref Range    Sodium 137 135 - 145 mmol/L    Potassium 3.5 (L) 3.6 - 5.5 mmol/L    Chloride 102 96 - 112 mmol/L    Co2 22 20 - 33 mmol/L    Glucose 120 (H) 65 - 99 mg/dL    Bun 8 8 - 22 mg/dL    Creatinine 0.64 0.50 - 1.40 mg/dL    Calcium 8.2 (L) 8.5 - 10.5 mg/dL    Anion Gap 13.0 7.0 - 16.0   MAGNESIUM    Collection Time: 08/09/20  1:22 AM   Result Value Ref Range    Magnesium 1.7 1.5 - 2.5 mg/dL   ESTIMATED GFR    Collection Time: 08/09/20  1:22 AM   Result Value Ref Range    GFR If African American >60 >60 mL/min/1.73 m 2    GFR If Non African American >60 >60 mL/min/1.73 m 2          Cranial Imaging Hx: CT of the head on 8/9/2020: \"   No acute intracranial abnormality\"    Psychiatric Examination:  Vitals: Blood pressure (!) 168/91, pulse 64, temperature 37.1 °C (98.7 °F), temperature source Temporal, resp. rate 16, height 1.803 m (5' 11\"), weight 67.1 kg (148 lb), SpO2 96 %.  Musculoskeletal: normal psychomotor activity, no tics or unusual mannerisms noted  Appearance and Eye Contact: WDWN, appropriate dress and grooming. Behavior is calm, cooperative,  appropriate eye contact  Attention/Alertness: Alert  Thought Process: Linear    Thought Content: No psychotic processes noted  Speech: Clear with normal rate and rhythm  Mood: \"ok\"            Affect: euthymic         SI/HI: Denies    Memory: Recent and remote memory appear impaired  " "  Orientation: alert, disoriented  Insight into symptoms: very poor  Judgement into symptoms:very poor    Neuropsychological Testing:   Not formally tested. SLP cognitive testing ordered.     SLP cognitive testing performed on 8/27/2018: \"\"Patient is awake and alert and Ox4. He presents with symptoms of moderate-severe cognitive deficits. Specifically, immediate and short memory, safety awareness, insight, and problem solving are impaired.\"    SLP cognitive testing performed on 8/10/2020: \"    \"Pt was administered the Cognistat (The Neurobehavioral Cognitive Status Examination) and his ability to participate fluctuated. Initially, Pt scored +7/12 (58% accuracy) for Orientation. Pt was unable to recall his age, the current month, day of week and/or day of month. During the attention task, Pt made no verbal responses for first 2 attempts; however, was able to recall digit repetition on the 3rd attempt. Pt with poor effort during four word memory task, and mumbled the answers and would not repeat them again. When asked to stay awake, Pt shook his head, and tried to say a phrase multiple times before giving up and hitting himself in the head while cursing and scowling. Pt then became more agitated, and started to raise his voice to this clinician. Session was discontinued due to Pt's increasing confusion/agitation and poor effort during testing.     Compared to Pt's initial cognitive eval in 2018, Pt's level of orientation has worsened and unable to fully assess other cognitive domains secondary to the aforementioned information. Will re-attempt to complete testing as Pt is able/willing to participate; however, do not suspect Pt's cognition has improved significantly since initial evaluation and continue to recommend 24/7 supervision following discharge.\"        ASSESSMENT: Given the above, the patient is currently INCAPACITATED to make medical and discharge decisions. He is also INCAPACITATED to leave AMA as his " oriented to even being in the hospital is waxing and waning and he does not appear to understand his medical needed.     Highly recommend pursuing guardianship given his current presentation, pattern of inability to care for himself, and his cognitive deficits that at this point appear to be chronic.     DSM5 Diagnostic Considerations:   Unspecified Major Neurocognitive Disorder  Alcohol Use Disorder, severe     PLAN:  Legal status: NA    Not capacitated to make medical and discharge decisions.    Not capacitated to make the decision to leave AMA.    Highly recommend pursuing guardianship.    Signing off. Please feel free to reconsult if mentation improves and patient appears to be able to reason better.    Records reviewed: yes  Discussed patient with other provider: yes  Signing off  Thank you for the consult.    Deyanira Peña, Ph.D.

## 2020-08-09 NOTE — PSYCHIATRY
"BRIEF PSYCHIATRIC CONSULT NOTE: patient seen and assessed, He is oriented to his name, the year, and the month. However, his orientation to where he is waxed and waned during the course of the evaluation. He was unable to explain his medical conditions and what his treatment team has recommended. He was also unable to explain how he would find shelter [facility] nor how he would obtain money as he believes his social security is \"messed up.\"     Given the above, he is currently INCAPACITATED to make medical and discharge decisions. He is also INCAPACITATED to leave AMA as his orientation to even being in a hospital is waxing and waning and he does not appear to understand his medical needs.    The patient also stated recognition that he is having some memory problems. Per chart review, he was tested in 2018 and exhibited moderate to severe deficits in memory, safety awareness, insight, and problem solving. At that time, 24/7 supervision was recommended. As it has been 2 years since his last cognitive evaluation, will order another one to assess his current cognitive functioning.    Recommend pursuing guardianship as given his current presentation, pattern of inability to care for himself (see previous capacity evaluation performed by this writer),and his cognitive deficits in 2018, I do wonder if his current impairments are permanent.    Full note to follow.    -Legal Hold:not indicated   -NOT CAPACITATED to make medical and discharge decisions.  -NOT CAPACITATED to make the decision to leave AMA  -Will order SLP cognitive evaluation  -Will follow loosely as I await the cognitive evaluation             "

## 2020-08-09 NOTE — THERAPY
"Occupational Therapy   Initial Evaluation     Patient Name: Bola Varela  Age:  64 y.o., Sex:  male  Medical Record #: [id number]  Today's Date: 8/9/2020     Precautions: Fall Risk    Assessment  Patient is 64 y.o. male seen for occupational therapy evaluation.  Pt admitted with non-healing wound LLE and cellulitis with sepsis.  Pt with unspecified psych history.  Pt is completing simple self-care tasks and functional mobility at a supervised/mod independent level at this time.  Pt is confused and is likely at baseline.  Pt with no further acute OT services at this time, however he will likely require placement with 24/7 supervision.  .OT evaluation completed.    Patient is currently not being actively followed for occupational therapy services at this time. However, pt may be seen at the request of attending provider for an additional visit to address any discharge or equipment needs within 30 days from evaluation.        Plan    Recommend Occupational Therapy for Evaluation only.    DC Equipment Recommendations: Unable to determine at this time  Discharge Recommendations: (P) Other -(Pt likely requires 24/7 supervision due to cognition)        Objective       08/09/20 1514   Prior Living Situation   Prior Services None   Housing / Facility Homeless   Equipment Owned None   Lives with - Patient's Self Care Capacity Alone and Unable to Care For Self   Prior Level of ADL Function   Self Feeding Independent   Grooming / Hygiene Independent   Bathing Independent   Dressing Independent   Toileting Independent   Prior Level of IADL Function   Driving / Transportation Walks   Occupation (Pre-Hospital Vocational) Not Employed   Comments Pt reports he gets meals \"where he can\"   Cognition    Cognition / Consciousness X   Orientation Level Not Oriented to Year;Not Oriented to Month   Level of Consciousness Confused   Safety Awareness Impaired;Impulsive   New Learning Impaired   Comments Pt cooperative but forgetful. "   Active ROM Upper Body   Active ROM Upper Body  WDL   Strength Upper Body   Upper Body Strength  WDL   Balance Assessment   Sitting Balance (Static) Good   Sitting Balance (Dynamic) Good   Standing Balance (Static) Fair +   Standing Balance (Dynamic) Fair   Weight Shift Sitting Good   Weight Shift Standing Good   Bed Mobility    Supine to Sit Supervised   Sit to Supine Supervised   Scooting Supervised   ADL Assessment   Grooming Supervision;Standing  (pt washed hands at sink)   Lower Body Dressing Supervision   Toileting Supervision   Functional Mobility   Sit to Stand Supervised   Bed, Chair, Wheelchair Transfer Supervised   Toilet Transfers   (pt stood for toileting)   Mobility to/from bathroom without AD

## 2020-08-09 NOTE — PROGRESS NOTES
"Hospital Medicine Daily Progress Note    Date of Service  8/9/2020    Chief Complaint  LLE wound    Hospital Course   Mr. Varela is a 64-year-old male with PMH significant for homelessness, tobacco dependence and chronic LLE wound who presented 8/7/2020 with LLE wound infection.  He was hospitalized at our facility in April and evaluated by orthopedic surgery who recommended wound care.  Wound cultures at that time grew MSSA and strep.  Patient reported losing his Medicaid card and having no transportation to wound clinic.  He was seen at [facility] earlier this week and prescribed ABX, however he has not filled the prescription.  US LLE negative for DVT.  Treated for sepsis with empiric ABX and wound care.      Interval Problem Update  8/9 - more calm today. Does not remember events of yesterday. Psych consult today suggests he is not capacitated to make medical decisions or leave AMA. Recommend SLP consult. Likely will need guardianship and placement.    8/8 - yelling out and anxious because he can't find his debit card. Difficult to de-escalate verbally \"please just give me something\". Nonviolent. Security at bedside. CIWA ordered     Consultants/Specialty  Psychiatry    Code Status  Full Code    Disposition  TBD    Review of Systems  Review of Systems   Unable to perform ROS: Psychiatric disorder        Physical Exam  Temp:  [36.2 °C (97.2 °F)-37.1 °C (98.7 °F)] 37.1 °C (98.7 °F)  Pulse:  [58-77] 64  Resp:  [16-17] 16  BP: (129-168)/(83-97) 168/91  SpO2:  [96 %-97 %] 96 %    Physical Exam  Vitals signs and nursing note reviewed. Exam conducted with a chaperone present.   Constitutional:       General: He is awake. He is not in acute distress.     Appearance: He is underweight. He is ill-appearing.      Comments: Disheveled. Unkempt   HENT:      Right Ear: Decreased hearing noted.      Left Ear: Decreased hearing noted.      Nose: Nose normal.      Mouth/Throat:      Lips: Pink.      Mouth: Mucous " membranes are dry.   Cardiovascular:      Rate and Rhythm: Normal rate and regular rhythm.      Heart sounds: Normal heart sounds.   Pulmonary:      Effort: Pulmonary effort is normal.      Breath sounds: Decreased breath sounds present.   Abdominal:      General: Bowel sounds are normal.      Palpations: Abdomen is soft.      Tenderness: There is no abdominal tenderness. There is no guarding or rebound.   Genitourinary:     Comments: Voiding concentrated yellow  Musculoskeletal:      Left knee: He exhibits decreased range of motion.      Right lower leg: No edema.      Left lower leg: No edema.      Comments: Dressing CDI   Skin:     General: Skin is warm and dry.   Neurological:      Mental Status: He is alert. He is disoriented.      Sensory: Sensation is intact.      Motor: Motor function is intact.   Psychiatric:         Mood and Affect: Mood is anxious. Affect is labile.         Behavior: Behavior is uncooperative and agitated. Behavior is not aggressive or combative.         Cognition and Memory: Cognition is impaired.         Judgment: Judgment is impulsive.      Comments: Less anxious today  Less agitated today           Fluids    Intake/Output Summary (Last 24 hours) at 8/9/2020 1245  Last data filed at 8/9/2020 0841  Gross per 24 hour   Intake 640 ml   Output --   Net 640 ml       Laboratory  Recent Labs     08/07/20  1320 08/08/20  0537 08/09/20  0122   WBC 13.9* 8.6 6.6   RBC 3.95* 3.37* 3.48*   HEMOGLOBIN 11.4* 9.9* 10.4*   HEMATOCRIT 36.0* 30.3* 31.7*   MCV 91.1 89.9 91.1   MCH 28.9 29.4 29.9   MCHC 31.7* 32.7* 32.8*   RDW 56.4* 56.3* 56.7*   PLATELETCT 524* 380 383   MPV 9.1 9.3 9.0     Recent Labs     08/07/20  1320 08/08/20  0537 08/09/20  0122   SODIUM 132* 135 137   POTASSIUM 3.8 3.5* 3.5*   CHLORIDE 96 101 102   CO2 20 20 22   GLUCOSE 106* 104* 120*   BUN 14 13 8   CREATININE 0.75 0.55 0.64   CALCIUM 8.7 8.2* 8.2*                   Imaging  DX-TIBIA AND FIBULA LEFT   Final Result      1.  Healed  distal metaphyseal fractures of the left fibula and tibia with tibial IM layla in place.      2.  No acute fracture or dislocation.      3.  No radiopaque soft tissue foreign body.      DX-FEMUR-2+ LEFT   Final Result      1.  No radiographic evidence of acute traumatic injury left femur.      2.  Unchanged cortical thickening in the posterior aspect of the distal femoral diaphysis which may represent sequela of prior injury or infection.      3.  No soft tissue foreign body identified.      US-EXTREMITY VENOUS LOWER UNILAT LEFT   Final Result      DX-CHEST-PORTABLE (1 VIEW)   Final Result      No acute cardiopulmonary abnormality.      CT-HEAD W/O    (Results Pending)        Assessment/Plan  Open wound of left lower leg- (present on admission)  Assessment & Plan  -chronic  -poor compliance with OP wound care. He can't remember the last time he had his dressing changed OP  -wound care  -wound culture pending - Previous cultures grew MSSA and group A strep.  Preliminary wound cultures from this hospitalization also growing MSSA and group A strep  -blood cultures (-) to date  -afebrile. Leukocytosis resolved  -continue ABX  -LLE US (-) DVT    Hypomagnesemia  Assessment & Plan  -Likely related to EtOH abuse history  -Replacing  -Follow labs    Hypokalemia- (present on admission)  Assessment & Plan  -Also with hypomagnesemia  -Replacing  -A.m. BMP    Flexion contracture of knee, 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)  Assessment & Plan  -secondary to chronic wound in that area  -PT OT  -encourage mobility      Encephalopathy- (present on admission)  Assessment & Plan  -acute on chronic  -likely due to significant EtOH history.  Possible component of Warnicke's encephalopathy  -Psychiatry evaluated him and deemed him incapacitated to make medical decision or leave AMA and recommend SLP cognitive eval.  Patient will likely require guardianship and placement  -check head CT  -no acute neurological deficits  -Avoid narcotics and  "hypnotics.  Cautious use of benzodiazepines for alcohol withdrawal protocol  -Frequent reorientation  -Sleep hygiene      Non-compliance  Assessment & Plan  -likely also component of psychiatric disorder and ETOH abuse  -Psychiatric consult today suggests patient is incapacitated to make medical decisions or leave AMA  -ongoing encouragement for compliance.    Psychiatric disorder  Assessment & Plan  -unknown/unspecified  -not currently on medication  -history of severe alcohol dependence.  -psychiatry consult today - incapacitated to leave AMA or make medical decisions      Alcohol dependence (HCC)- (present on admission)  Assessment & Plan  -history of.  Ongoing.  He reports drinking \"once in a while\"  -withdrawal previous hospitalizations  -with anxiety  -CIWA protocol  -seizure precautions  -MVI and thiamine    Protein malnutrition (HCC)  Assessment & Plan  -history of ETOH and homelessness  -dietary consult  -TID supplements  -encourage PO intake    Sepsis (HCC)- (present on admission)  Assessment & Plan  -This is Sepsis Present on admission  SIRS criteria identified on my evaluation include: Leukocytosis, with WBC greater than 12,000 - leukocytosis resolved  Source is cellulitis and wound infection  Sepsis protocol initiated  Fluid resuscitation ordered per protocol  IV antibiotics as appropriate for source of sepsis  While organ dysfunction may be noted elsewhere in this problem list or in the chart, degree of organ dysfunction does not meet CMS criteria for severe sepsis  -afebrile  -lactic WNL  -blood cultures negative to date        Tobacco dependence- (present on admission)  Assessment & Plan  Patient counseled on cessation         VTE prophylaxis: Enoxaparin      I have performed a physical exam and reviewed and updated ROS and Assessment/Plan today (08/09/20). In review of the previous note there are no changes except as documented above    Please note that this dictation was created using voice " recognition software. I have made every reasonable attempt to correct obvious errors, but there may be errors of grammar and possibly content that I did not discover before finalizing the note.    LOUISE Khan.

## 2020-08-10 LAB
ANION GAP SERPL CALC-SCNC: 14 MMOL/L (ref 7–16)
BACTERIA WND AEROBE CULT: ABNORMAL
BUN SERPL-MCNC: 6 MG/DL (ref 8–22)
CALCIUM SERPL-MCNC: 8.8 MG/DL (ref 8.5–10.5)
CHLORIDE SERPL-SCNC: 99 MMOL/L (ref 96–112)
CO2 SERPL-SCNC: 26 MMOL/L (ref 20–33)
CREAT SERPL-MCNC: 0.68 MG/DL (ref 0.5–1.4)
GLUCOSE SERPL-MCNC: 95 MG/DL (ref 65–99)
GRAM STN SPEC: ABNORMAL
MAGNESIUM SERPL-MCNC: 1.6 MG/DL (ref 1.5–2.5)
PARASITE SPEC INSPECT: ABNORMAL
PARASITE SPEC INSPECT: ABNORMAL
PHOSPHATE SERPL-MCNC: 4.4 MG/DL (ref 2.5–4.5)
POTASSIUM SERPL-SCNC: 3.9 MMOL/L (ref 3.6–5.5)
SIGNIFICANT IND 70042: ABNORMAL
SIGNIFICANT IND 70042: ABNORMAL
SITE SITE: ABNORMAL
SITE SITE: ABNORMAL
SODIUM SERPL-SCNC: 139 MMOL/L (ref 135–145)
SOURCE SOURCE: ABNORMAL
SOURCE SOURCE: ABNORMAL

## 2020-08-10 PROCEDURE — 36415 COLL VENOUS BLD VENIPUNCTURE: CPT

## 2020-08-10 PROCEDURE — 84100 ASSAY OF PHOSPHORUS: CPT

## 2020-08-10 PROCEDURE — 700105 HCHG RX REV CODE 258: Performed by: HOSPITALIST

## 2020-08-10 PROCEDURE — 87169 MACROSCOPIC EXAM PARASITE: CPT

## 2020-08-10 PROCEDURE — A9270 NON-COVERED ITEM OR SERVICE: HCPCS | Performed by: HOSPITALIST

## 2020-08-10 PROCEDURE — 700102 HCHG RX REV CODE 250 W/ 637 OVERRIDE(OP): Performed by: NURSE PRACTITIONER

## 2020-08-10 PROCEDURE — 700111 HCHG RX REV CODE 636 W/ 250 OVERRIDE (IP): Performed by: HOSPITALIST

## 2020-08-10 PROCEDURE — 770001 HCHG ROOM/CARE - MED/SURG/GYN PRIV*

## 2020-08-10 PROCEDURE — 97161 PT EVAL LOW COMPLEX 20 MIN: CPT

## 2020-08-10 PROCEDURE — 99232 SBSQ HOSP IP/OBS MODERATE 35: CPT | Performed by: HOSPITALIST

## 2020-08-10 PROCEDURE — A9270 NON-COVERED ITEM OR SERVICE: HCPCS | Performed by: NURSE PRACTITIONER

## 2020-08-10 PROCEDURE — 83735 ASSAY OF MAGNESIUM: CPT

## 2020-08-10 PROCEDURE — 92523 SPEECH SOUND LANG COMPREHEN: CPT

## 2020-08-10 PROCEDURE — 80048 BASIC METABOLIC PNL TOTAL CA: CPT

## 2020-08-10 PROCEDURE — 700102 HCHG RX REV CODE 250 W/ 637 OVERRIDE(OP): Performed by: HOSPITALIST

## 2020-08-10 PROCEDURE — 700111 HCHG RX REV CODE 636 W/ 250 OVERRIDE (IP): Performed by: NURSE PRACTITIONER

## 2020-08-10 RX ORDER — NICOTINE 21 MG/24HR
21 PATCH, TRANSDERMAL 24 HOURS TRANSDERMAL
Status: DISCONTINUED | OUTPATIENT
Start: 2020-08-10 | End: 2020-09-09

## 2020-08-10 RX ADMIN — AMPICILLIN AND SULBACTAM 3 G: 2; 1 INJECTION, POWDER, FOR SOLUTION INTRAVENOUS at 06:09

## 2020-08-10 RX ADMIN — Medication 100 MG: at 06:10

## 2020-08-10 RX ADMIN — AMPICILLIN AND SULBACTAM 3 G: 2; 1 INJECTION, POWDER, FOR SOLUTION INTRAVENOUS at 16:54

## 2020-08-10 RX ADMIN — HALOPERIDOL LACTATE 5 MG: 5 INJECTION, SOLUTION INTRAMUSCULAR at 17:33

## 2020-08-10 RX ADMIN — ENOXAPARIN SODIUM 40 MG: 40 INJECTION SUBCUTANEOUS at 06:09

## 2020-08-10 RX ADMIN — DOCUSATE SODIUM 50 MG AND SENNOSIDES 8.6 MG 2 TABLET: 8.6; 5 TABLET, FILM COATED ORAL at 06:10

## 2020-08-10 RX ADMIN — Medication 1 MG: at 06:10

## 2020-08-10 RX ADMIN — THERA TABS 1 TABLET: TAB at 06:10

## 2020-08-10 RX ADMIN — LORAZEPAM 0.5 MG: 2 INJECTION INTRAMUSCULAR; INTRAVENOUS at 16:53

## 2020-08-10 RX ADMIN — ACETAMINOPHEN 650 MG: 325 TABLET, FILM COATED ORAL at 19:44

## 2020-08-10 RX ADMIN — Medication 400 MG: at 16:54

## 2020-08-10 RX ADMIN — OXYCODONE HYDROCHLORIDE 5 MG: 5 TABLET ORAL at 19:44

## 2020-08-10 RX ADMIN — LORAZEPAM 1 MG: 1 TABLET ORAL at 06:10

## 2020-08-10 RX ADMIN — AMPICILLIN AND SULBACTAM 3 G: 2; 1 INJECTION, POWDER, FOR SOLUTION INTRAVENOUS at 12:10

## 2020-08-10 RX ADMIN — LORAZEPAM 2 MG: 1 TABLET ORAL at 22:19

## 2020-08-10 ASSESSMENT — LIFESTYLE VARIABLES
HEADACHE, FULLNESS IN HEAD: NOT PRESENT
NAUSEA AND VOMITING: NO NAUSEA AND NO VOMITING
AGITATION: NORMAL ACTIVITY
PAROXYSMAL SWEATS: BARELY PERCEPTIBLE SWEATING. PALMS MOIST
AUDITORY DISTURBANCES: NOT PRESENT
AUDITORY DISTURBANCES: NOT PRESENT
VISUAL DISTURBANCES: NOT PRESENT
PAROXYSMAL SWEATS: BARELY PERCEPTIBLE SWEATING. PALMS MOIST
TOTAL SCORE: 3
HEADACHE, FULLNESS IN HEAD: VERY MILD
HEADACHE, FULLNESS IN HEAD: MILD
AGITATION: SOMEWHAT MORE THAN NORMAL ACTIVITY
AUDITORY DISTURBANCES: NOT PRESENT
ANXIETY: MODERATELY ANXIOUS OR GUARDED, SO ANXIETY IS INFERRED
TOTAL SCORE: 4
ANXIETY: MODERATELY ANXIOUS OR GUARDED, SO ANXIETY IS INFERRED
HEADACHE, FULLNESS IN HEAD: NOT PRESENT
TOTAL SCORE: 15
NAUSEA AND VOMITING: NO NAUSEA AND NO VOMITING
ANXIETY: MILDLY ANXIOUS
ORIENTATION AND CLOUDING OF SENSORIUM: DATE DISORIENTATION BY MORE THAN TWO CALENDAR DAYS
PAROXYSMAL SWEATS: BARELY PERCEPTIBLE SWEATING. PALMS MOIST
VISUAL DISTURBANCES: NOT PRESENT
HEADACHE, FULLNESS IN HEAD: MODERATE
TOTAL SCORE: 10
NAUSEA AND VOMITING: NO NAUSEA AND NO VOMITING
HEADACHE, FULLNESS IN HEAD: NOT PRESENT
TREMOR: NO TREMOR
TREMOR: NO TREMOR
TOTAL SCORE: 13
ORIENTATION AND CLOUDING OF SENSORIUM: DATE DISORIENTATION BY MORE THAN TWO CALENDAR DAYS
PAROXYSMAL SWEATS: BARELY PERCEPTIBLE SWEATING. PALMS MOIST
NAUSEA AND VOMITING: NO NAUSEA AND NO VOMITING
AGITATION: SOMEWHAT MORE THAN NORMAL ACTIVITY
TREMOR: NO TREMOR
AGITATION: SOMEWHAT MORE THAN NORMAL ACTIVITY
TREMOR: *
VISUAL DISTURBANCES: NOT PRESENT
TOTAL SCORE: VERY MILD ITCHING, PINS AND NEEDLES SENSATION, BURNING OR NUMBNESS
ANXIETY: MODERATELY ANXIOUS OR GUARDED, SO ANXIETY IS INFERRED
TOTAL SCORE: 9
AGITATION: SOMEWHAT MORE THAN NORMAL ACTIVITY
AUDITORY DISTURBANCES: NOT PRESENT
VISUAL DISTURBANCES: NOT PRESENT
PAROXYSMAL SWEATS: BARELY PERCEPTIBLE SWEATING. PALMS MOIST
VISUAL DISTURBANCES: NOT PRESENT
ANXIETY: NO ANXIETY (AT EASE)
TOTAL SCORE: VERY MILD ITCHING, PINS AND NEEDLES SENSATION, BURNING OR NUMBNESS
AUDITORY DISTURBANCES: NOT PRESENT
ORIENTATION AND CLOUDING OF SENSORIUM: DATE DISORIENTATION BY MORE THAN TWO CALENDAR DAYS
TOTAL SCORE: 10
PAROXYSMAL SWEATS: NO SWEAT VISIBLE
TREMOR: TREMOR NOT VISIBLE BUT CAN BE FELT, FINGERTIP TO FINGERTIP
ORIENTATION AND CLOUDING OF SENSORIUM: DATE DISORIENTATION BY MORE THAN TWO CALENDAR DAYS
AGITATION: SOMEWHAT MORE THAN NORMAL ACTIVITY
ANXIETY: *
ORIENTATION AND CLOUDING OF SENSORIUM: DATE DISORIENTATION BY MORE THAN TWO CALENDAR DAYS
NAUSEA AND VOMITING: NO NAUSEA AND NO VOMITING
ORIENTATION AND CLOUDING OF SENSORIUM: DATE DISORIENTATION BY MORE THAN TWO CALENDAR DAYS
AUDITORY DISTURBANCES: NOT PRESENT
NAUSEA AND VOMITING: NO NAUSEA AND NO VOMITING
ANXIETY: NO ANXIETY (AT EASE)
TOTAL SCORE: MILD ITCHING, PINS AND NEEDLES SENSATION, BURNING OR NUMBNESS
ORIENTATION AND CLOUDING OF SENSORIUM: DATE DISORIENTATION BY MORE THAN TWO CALENDAR DAYS
VISUAL DISTURBANCES: NOT PRESENT
VISUAL DISTURBANCES: NOT PRESENT
TREMOR: *
AGITATION: NORMAL ACTIVITY
PAROXYSMAL SWEATS: BARELY PERCEPTIBLE SWEATING. PALMS MOIST
TREMOR: TREMOR NOT VISIBLE BUT CAN BE FELT, FINGERTIP TO FINGERTIP
HEADACHE, FULLNESS IN HEAD: VERY MILD
AUDITORY DISTURBANCES: NOT PRESENT
NAUSEA AND VOMITING: NO NAUSEA AND NO VOMITING

## 2020-08-10 ASSESSMENT — COGNITIVE AND FUNCTIONAL STATUS - GENERAL
WALKING IN HOSPITAL ROOM: A LITTLE
CLIMB 3 TO 5 STEPS WITH RAILING: A LITTLE
TURNING FROM BACK TO SIDE WHILE IN FLAT BAD: A LITTLE
MOVING FROM LYING ON BACK TO SITTING ON SIDE OF FLAT BED: A LITTLE
STANDING UP FROM CHAIR USING ARMS: A LITTLE
MOVING TO AND FROM BED TO CHAIR: A LITTLE
MOBILITY SCORE: 18
SUGGESTED CMS G CODE MODIFIER MOBILITY: CK

## 2020-08-10 ASSESSMENT — GAIT ASSESSMENTS
DISTANCE (FEET): 25
DEVIATION: OTHER (COMMENT);SHUFFLED GAIT
GAIT LEVEL OF ASSIST: SUPERVISED

## 2020-08-10 NOTE — THERAPY
Physical Therapy   Initial Evaluation     Patient Name: Bola Varela  Age:  64 y.o., Sex:  male  Medical Record #: [id number]  Today's Date: 8/10/2020     Precautions: (P) Fall Risk    Assessment  Bola Varela is a 64 y.o. male with a admitted due to LLE infection.  Pt SPV for all bed mobility and transfers. Noted slight unsteadiness upon initial standing however pt able to correct self. Trialed gait using FWW and SPV however pt reports not liking FWW. Trialed gait using no AD with no change in gait noted. Pt walks with LLE toe contact and short strides however requires no cueing nor physical assist. No further acute PT needs at this time however will revisit if needed.    Plan    Recommend Physical Therapy for Evaluation only .    DC Equipment Recommendations: (P) None  Discharge Recommendations: (P) Other - Patient will not be actively followed for physical therapy services at this time, however may be seen if requested by physician for 1 more visit within 30 days to address any discharge or equipment needs        Objective     08/10/20 1454   Prior Living Situation   Prior Services None   Housing / Facility Homeless   Equipment Owned None   Lives with - Patient's Self Care Capacity Alone and Unable to Care For Self   Prior Level of Functional Mobility   Bed Mobility Independent   Transfer Status Independent   Ambulation Independent   Distance Ambulation (Feet)   (community)   Assistive Devices Used None   Stairs Independent   Cognition    Level of Consciousness Alert   Passive ROM Lower Body   Passive ROM Lower Body X   Comments L knee  due to wound, remaining WFL   Active ROM Lower Body    Active ROM Lower Body  X   Comments L knee  due to wound, remaining WFL   Strength Lower Body   Lower Body Strength  WDL   Sensation Lower Body   Lower Extremity Sensation   WDL   Balance Assessment   Sitting Balance (Static) Good   Sitting Balance (Dynamic) Good   Standing Balance (Static) Fair +    Standing Balance (Dynamic) Fair   Weight Shift Sitting Good   Weight Shift Standing Fair   Gait Analysis   Gait Level Of Assist Supervised   Assistive Device None   Distance (Feet) 25   # of Times Distance was Traveled   (1x with FWW, 1x no AD)   Deviation Other (Comment);Shuffled Gait  (absent heel strike, L knee in flexion)   Weight Bearing Status FWB   Bed Mobility    Supine to Sit Supervised   Sit to Supine Supervised   Scooting Supervised   Functional Mobility   Sit to Stand Supervised   Education Group   Education Provided Role of Physical Therapist   Role of Physical Therapist Patient Response Patient;Acceptance;Explanation;Action Demonstration   Anticipated Discharge Equipment and Recommendations   DC Equipment Recommendations None   Discharge Recommendations Other -

## 2020-08-10 NOTE — CARE PLAN
Problem: Infection  Goal: Will remain free from infection  Outcome: PROGRESSING AS EXPECTED  Intervention: Assess signs and symptoms of infection  Note: Receiving Iv abx       Problem: Mobility  Goal: Risk for activity intolerance will decrease  Outcome: PROGRESSING AS EXPECTED  Intervention: Assess and monitor signs of activity intolerance  Note: Pt ambulates frequently.

## 2020-08-10 NOTE — THERAPY
"Speech Language Pathology   Initial Assessment     Patient Name: Bola Varela  AGE:  64 y.o., SEX:  male  Medical Record #: [id number]  Today's Date: 8/10/2020     Precautions: Fall Risk    Assessment    Patient is 64 y.o. male \"who presented 8/7/2020 with history of chronic left lower extremity wound for which she was hospitalized at our facility in April evaluated by orthopedic surgery at that time wound care was recommended.  His cultures at that time grew MSSA and strep.  Patient is currently homeless he reports that he lost his Medicaid card and has had no transportation to medical appointments or wound care clinic.  He was seen at [facility] earlier this week received wound care and prescription for antibiotics but has not filled it.  He has noted mild drainage from his wound with associated redness and increased swelling.\" CT head and CXR were both negative for acute findings/abnormalities. Pt last seen for cog eval on 8/27/18 with the following findings: \"Patient is awake and alert and Ox4. He presents with symptoms of moderate-severe cognitive deficits. Specifically, immediate and short memory, safety awareness, insight, and problem solving are impaired.\"    Pt seen today for cognitive-linguistic evaluation. Per discussion with RN, Pt is on CIWA protocol; however, he is appropriate to participate with evaluation. Upon arrival to room, Pt was found asleep in bed. Pt awoke easily with verbal cues; however, had difficulties keeping his eyes open despite bright lights, verbal cues, tactile cues, etc. Pt was agreeable to therapy objectives and declined to postpone visit until later. Pt was administered the Cognistat (The Neurobehavioral Cognitive Status Examination) and his ability to participate fluctuated. Initially, Pt scored +7/12 (58% accuracy) for Orientation. Pt was unable to recall his age, the current month, day of week and/or day of month. During the attention task, Pt made no verbal responses for " first 2 attempts; however, was able to recall digit repetition on the 3rd attempt. Pt with poor effort during four word memory task, and mumbled the answers and would not repeat them again. When asked to stay awake, Pt shook his head, and tried to say a phrase multiple times before giving up and hitting himself in the head while cursing and scowling. Pt then became more agitated, and started to raise his voice to this clinician. Session was discontinued due to Pt's increasing confusion/agitation and poor effort during testing.    Compared to Pt's initial cognitive eval in 2018, Pt's level of orientation has worsened and unable to fully assess other cognitive domains secondary to the aforementioned information. Will re-attempt to complete testing as Pt is able/willing to participate; however, do not suspect Pt's cognition has improved significantly since initial evaluation and continue to recommend 24/7 supervision following discharge.    Plan    Recommend Speech Therapy 2 times per week until therapy goals are met for the following treatments:  Cognitive-Linguistic Training and Patient / Family / Caregiver Education.    Objective       08/10/20 0930   Prior Level Of Function   Communication Impaired   Swallow Unknown   Verbal Expression   Verbal Output Conversation Severe (2)   Verbal Output Functional Severe (2)   Auditory Comprehension   Follows One Unit Commands Moderate (3)   Understands Simple, Structured Conversation  Moderate (3)   Reading Comprehension   Reading Comprehension (WDL)   (Unable to assess due to inadquate alertness)   Written Expression   Written Expression (WDL)   (Unable to assess due to inadquate alertness)   Cognitive-Linguistic   Level of Consciousness Confused   Orientation Level Not Oriented to Age;Not Oriented to Year;Not Oriented to Month;Not Oriented to Day   Sustained Attention Severe (2)   Short Term Memory   (Required 3 repetitions to complete)   Outcome Measures   Outcome Measures  Utilized   (Cognistat; incomplete)   Short Term Goals   Short Term Goal # 1 Pt will complete cognitive-linguistic evaluation to further characterize current functioning.   Problem List   Problem List Cognitive-Linguistic Deficits

## 2020-08-10 NOTE — PROGRESS NOTES
"Hospital Medicine Daily Progress Note    Date of Service  8/10/2020    Chief Complaint  LLE wound    Hospital Course   Mr. Varela is a 64-year-old male with PMH significant for homelessness, tobacco dependence and chronic LLE wound who presented 8/7/2020 with LLE wound infection.  He was hospitalized at our facility in April and evaluated by orthopedic surgery who recommended wound care.  Wound cultures at that time grew MSSA and strep.  Patient reported losing his Medicaid card and having no transportation to wound clinic.  He was seen at [facility] earlier this week and prescribed ABX, however he has not filled the prescription.  US LLE negative for DVT.  Treated for sepsis with empiric ABX and wound care.      Interval Problem Update  8/10 -sleepy this morning.  Received multiple doses of Ativan overnight for CIWA 21.  -One-to-one safety sitter at bedside    8/9 - more calm today. Does not remember events of yesterday. Psych consult today suggests he is not capacitated to make medical decisions or leave AMA. Recommend SLP consult. Likely will need guardianship and placement.    8/8 - yelling out and anxious because he can't find his debit card. Difficult to de-escalate verbally \"please just give me something\". Nonviolent. Security at bedside. CIWA ordered     Consultants/Specialty  Psychiatry    Code Status  Full Code    Disposition  Will need guardianship and placement    Review of Systems  Review of Systems   Unable to perform ROS: Psychiatric disorder        Physical Exam  Temp:  [36.4 °C (97.5 °F)-37.3 °C (99.1 °F)] 36.9 °C (98.5 °F)  Pulse:  [64-81] 79  Resp:  [15-18] 18  BP: (145-171)/() 145/88  SpO2:  [94 %-98 %] 96 %    Physical Exam  Vitals signs and nursing note reviewed. Exam conducted with a chaperone present.   Constitutional:       General: He is awake. He is not in acute distress.     Appearance: He is underweight. He is ill-appearing.      Comments: Disheveled. Unkempt   HENT:      " Right Ear: Decreased hearing noted.      Left Ear: Decreased hearing noted.      Nose: Nose normal.      Mouth/Throat:      Lips: Pink.      Mouth: Mucous membranes are dry.   Cardiovascular:      Rate and Rhythm: Normal rate and regular rhythm.      Heart sounds: Normal heart sounds.   Pulmonary:      Effort: Pulmonary effort is normal.      Breath sounds: Decreased breath sounds present.   Abdominal:      General: Bowel sounds are normal.      Palpations: Abdomen is soft.      Tenderness: There is no abdominal tenderness. There is no guarding or rebound.   Genitourinary:     Comments: Voiding concentrated yellow  Musculoskeletal:      Left knee: He exhibits decreased range of motion.      Right lower leg: No edema.      Left lower leg: No edema.      Comments: Dressing CDI   Skin:     General: Skin is warm and dry.   Neurological:      Mental Status: He is alert. He is disoriented.      Sensory: Sensation is intact.      Motor: Motor function is intact.   Psychiatric:         Mood and Affect: Mood is anxious. Affect is labile.         Behavior: Behavior is uncooperative and agitated. Behavior is not aggressive or combative.         Cognition and Memory: Cognition is impaired.         Judgment: Judgment is impulsive.      Comments: Less anxious today  Less agitated today           Fluids    Intake/Output Summary (Last 24 hours) at 8/10/2020 1421  Last data filed at 8/10/2020 1200  Gross per 24 hour   Intake 900 ml   Output --   Net 900 ml       Laboratory  Recent Labs     08/08/20  0537 08/09/20  0122   WBC 8.6 6.6   RBC 3.37* 3.48*   HEMOGLOBIN 9.9* 10.4*   HEMATOCRIT 30.3* 31.7*   MCV 89.9 91.1   MCH 29.4 29.9   MCHC 32.7* 32.8*   RDW 56.3* 56.7*   PLATELETCT 380 383   MPV 9.3 9.0     Recent Labs     08/08/20  0537 08/09/20  0122 08/10/20  0717   SODIUM 135 137 139   POTASSIUM 3.5* 3.5* 3.9   CHLORIDE 101 102 99   CO2 20 22 26   GLUCOSE 104* 120* 95   BUN 13 8 6*   CREATININE 0.55 0.64 0.68   CALCIUM 8.2* 8.2*  8.8                   Imaging  CT-HEAD W/O   Final Result      No acute intracranial abnormality      DX-TIBIA AND FIBULA LEFT   Final Result      1.  Healed distal metaphyseal fractures of the left fibula and tibia with tibial IM layla in place.      2.  No acute fracture or dislocation.      3.  No radiopaque soft tissue foreign body.      DX-FEMUR-2+ LEFT   Final Result      1.  No radiographic evidence of acute traumatic injury left femur.      2.  Unchanged cortical thickening in the posterior aspect of the distal femoral diaphysis which may represent sequela of prior injury or infection.      3.  No soft tissue foreign body identified.      US-EXTREMITY VENOUS LOWER UNILAT LEFT   Final Result      DX-CHEST-PORTABLE (1 VIEW)   Final Result      No acute cardiopulmonary abnormality.           Assessment/Plan  Open wound of left lower leg- (present on admission)  Assessment & Plan  -chronic  -poor compliance with OP wound care. He can't remember the last time he had his dressing changed OP  -wound care  -wound culture pending - Previous cultures grew MSSA and group A strep.  Preliminary wound cultures from this hospitalization also growing MSSA and group A strep  -blood cultures (-) to date  -afebrile. Leukocytosis resolved  -continue ABX  -LLE US (-) DVT    Hypomagnesemia  Assessment & Plan  -Likely related to EtOH abuse history  -Daily supplementation  -Follow labs as clinically indicated    Hypokalemia- (present on admission)  Assessment & Plan  -Also with hypomagnesemia  -Resolved after replacement    Flexion contracture of knee, 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)  Assessment & Plan  -secondary to chronic wound in that area  -PT OT  -encourage mobility      Encephalopathy- (present on admission)  Assessment & Plan  -acute on chronic  -likely due to significant EtOH history.  Possible component of Warnicke's encephalopathy  -Psychiatry evaluated him and deemed him incapacitated to make medical decision or leave AMA  "and recommend SLP cognitive eval.  Patient will likely require guardianship and placement  -CT head unremarkable  -no acute neurological deficits  -Avoid narcotics and hypnotics.  Cautious use of benzodiazepines for alcohol withdrawal protocol  -Frequent reorientation  -Sleep hygiene      Non-compliance  Assessment & Plan  -likely also component of psychiatric disorder and ETOH abuse  -Psychiatric consult today suggests patient is incapacitated to make medical decisions or leave AMA  -ongoing encouragement for compliance.    Psychiatric disorder  Assessment & Plan  -unknown/unspecified  -not currently on medication  -history of severe alcohol dependence.  -psychiatry consult today - incapacitated to leave AMA or make medical decisions      Alcohol dependence (HCC)- (present on admission)  Assessment & Plan  -history of.  Ongoing.  He reports drinking \"once in a while\"  -withdrawal previous hospitalizations  -with anxiety  -CIWA 6-21  -seizure precautions  -MVI and thiamine    Protein malnutrition (HCC)  Assessment & Plan  -history of ETOH and homelessness  -dietary consult  -TID supplements  -encourage PO intake    Sepsis (HCC)- (present on admission)  Assessment & Plan  -This is Sepsis Present on admission  SIRS criteria identified on my evaluation include: Leukocytosis, with WBC greater than 12,000   Source is cellulitis and wound infection  Sepsis protocol initiated  Fluid resuscitation ordered per protocol  IV antibiotics as appropriate for source of sepsis  While organ dysfunction may be noted elsewhere in this problem list or in the chart, degree of organ dysfunction does not meet CMS criteria for severe sepsis  -afebrile  -lactic WNL  -blood cultures negative to date  -Sepsis has resolved        Tobacco dependence- (present on admission)  Assessment & Plan  -Nicotine replacement protocol         VTE prophylaxis: Enoxaparin      I have performed a physical exam and reviewed and updated ROS and Assessment/Plan " today (08/10/20). In review of the previous note there are no changes except as documented above    Please note that this dictation was created using voice recognition software. I have made every reasonable attempt to correct obvious errors, but there may be errors of grammar and possibly content that I did not discover before finalizing the note.    LOUISE Khan.

## 2020-08-10 NOTE — DIETARY
"Nutrition services: Day 3 of admit.  Bola Varela is a 64 y.o. male with admitting DX of unspecified open wound of left lower leg.    Consult received for wound >stage 3 and protein malnutrition in admit dx.    Attempted to see pt at bedside, asleep during visit and notes describing agitation.    Assessment:  Height: 180.3 cm (5' 11\")  Weight: 67.1 kg (148 lb) - other healthcare, asked CNA for measured weight  Body mass index is 20.64 kg/m²., BMI classification: normal  Diet/Intake: regular with plastic utensils, 7 meals of % intake    Evaluation:   1. PMH of psychiatric problem, restless leg, and tobacco use.  2. Pt presents with lower left leg wound.  3. Observed pictures and wound team notes wound is from traumatic fall 2 years ago and is not pressure-related.  4. Per chart review, weight range is typically 148-151 lbs.   5. Observed severe muscle and severe fat loss of the clavical and temporal regions.  6. MD notes pt is homeless.  7. SLP notes was not able to finish eval d/t agitation.  8. Psych is following and notes pt is incapacitated to make own decisions at this time.  9. Labs: BUN 6  10. Meds: colace, rally bag, LR prn, zofran prn, bowel protocol  11. Last BM: 8/9    Malnutrition Risk: Severe malnutrition in the context of starvation-related as related to being homeless as evidenced by severe muscle and severe fat loss.    Recommendations/Plan:  1. Regular diet as tolerated.  2. Provided with food pantry prescription.  3. Encourage intake of meals.  4. Document intake of all meals as % taken in ADL's to provide interdisciplinary communication across all shifts.   5. Monitor weight.  6. Nutrition rep will continue to see patient for ongoing meal and snack preferences.     RD to follow.          "

## 2020-08-10 NOTE — DISCHARGE PLANNING
Anticipated Discharge Disposition: TBD    Action: per chart review, psych deemed pt incapacitated to make medical decisions and cannot leave AMA.     Barriers to Discharge: lacks capacity, homeless    Plan: f/u with medical team.

## 2020-08-10 NOTE — WOUND TEAM
[facility]  Inpatient Services  Initial Wound and Skin Care Evaluation    Admission Date: 8/7/2020     Last order of IP CONSULT TO WOUND CARE was found on 8/7/2020 from Hospital Encounter on 8/7/2020     HPI, PMH, SH: Reviewed    Unit where seen by Wound Team: [id number]     WOUND CONSULT/FOLLOW UP RELATED TO:  Left leg chronic wound      Self Report / Pain Level:  No s/s of pain        OBJECTIVE:  In bed.  Roll gauze dressing in place     WOUND TYPE, LOCATION, CHARACTERISTICS (Pressure Injuries: location, stage, POA or date identified)      Wound 08/07/20 Full Thickness Wound Leg Outer;Inner Left medial extending posterior  (Active)   Wound Image    08/09/20 1600   Site Assessment Pink;Red    Periwound Assessment Intact    Margins Attached edges;Epibole (rolled edges)    Closure None    Drainage Amount Small    Drainage Description Serosanguineous    Treatments Cleansed;Site care    Wound Cleansing Approved Wound Cleanser    Periwound Protectant Skin Protectant Wipes to Periwound    Dressing Cleansing/Solutions Not Applicable    Dressing Options Collagen Dressing;Nonadhesive Foam;Mepitel One;Dry Roll Gauze    Dressing Changed New    Dressing Status Clean;Dry;Intact    Dressing Change/Treatment Frequency By Wound Team Only    NEXT Dressing Change/Treatment Date 08/12/20    NEXT Weekly Photo (Inpatient Only) 08/16/20    Wound Length (cm) 4.5 cm    Wound Width (cm) 39 cm    Wound Depth (cm) 0.3 cm    Wound Surface Area (cm^2) 175.5 cm^2    Wound Volume (cm^3) 52.65 cm^3    WOUND NURSE ONLY - Time Spent with Patient (mins) 90    Number of days: 2        Vascular:    KOSTAS:   No results found.    Lab Values:    Lab Results   Component Value Date/Time    WBC 6.6 08/09/2020 01:22 AM    RBC 3.48 (L) 08/09/2020 01:22 AM    HEMOGLOBIN 10.4 (L) 08/09/2020 01:22 AM    HEMATOCRIT 31.7 (L) 08/09/2020 01:22 AM    CREACTPROT 13.54 (H) 08/07/2020 01:20 PM    SEDRATEWES 85 (H) 08/07/2020 01:20 PM    HBA1C 5.7 (H)  11/09/2018 06:30 PM        Culture Results show:  No results found for this or any previous visit (from the past 720 hour(s)).    INTERVENTIONS BY WOUND TEAM:  Patient pulled pant leg all the way up, patient reluctant to remove pants altogether but appear I can visualize 98% of wound.  Cleansed wound with wound cleanser and gauze.  Covered wound with agata moistened with NS.  Secured with mepitel one, covered with non adhesive foam and secured with roll gauze.      Interdisciplinary consultation: Patient, Bedside RN (Bola)     EVALUATION: patient very restless getting up and down throughout assessment.  Asked patient to remove left pant leg but he is anxious and reluctant.  Patient seen on previous admit for same wound.  Per pt, wound was obtained after a traumatic fall about 2 years ago.  Patient has been non compliant with follow up.  Agata for antimicrobial and assist wound through inflammatory.      Goals: Steady decrease in wound area and depth weekly.    NURSING PLAN OF CARE ORDERS (X):    Dressing changes: See Dressing Care orders: dressing changes per wound team  Skin care: See Skin Care orders: moves self   Rectal tube care: See Rectal Tube Care orders:   Other orders:    RSKIN:   CURRENTLY IN PLACE (X), APPLIED THIS VISIT (A), ORDERED (O):   Q shift [place]:  X  Q shift pressure point assessments:  X  Pressure redistribution mattress          X  Low Airloss          Bariatric NANCY         Bariatric foam           Heel float boots     Heel Silicone dressing        Float Heels off Bed with Pillows               Barrier wipes         Barrier Cream         Barrier paste          Sacral silicone dressing         Silicone O2 tubing         Anchorfast         Cannula fixation Device (Tender )          Gray Foam Ear protectors     High flow offloading Clip    Elastic head band offloading device                                                      Trach with Optifoam split foam       Z Damian Pillow                              Waffle cushion        Waffle Overlay         Rectal tube or BMS    Purwick/Condom Cath          Antifungal tx      Interdry          Reposition q 2 hours      TAPs Turning system                 Up to chair        Ambulate      PT/OT        Dietician        Diabetes Education      PO   X  TF     TPN     NPO   # days   Other        WOUND TEAM PLAN OF CARE:   Dressing changes by wound team:                 X  Follow up 3 times weekly:                NPWT change 3 times weekly:     Follow up 1-2 times weekly:      Follow up Bi-Monthly:                   Follow up as needed:       Other (explain):     Anticipated discharge plans: will need ongoing wound care post DC - non compliant   LTACH:        SNF/Rehab:                  Home Health Care:           Outpatient Wound Center:            Self Care:

## 2020-08-10 NOTE — CARE PLAN
Problem: Nutritional:  Goal: Achieve adequate nutritional intake  Description: Patient will consume 50% of meals  Outcome: PROGRESSING AS EXPECTED

## 2020-08-11 LAB
ANION GAP SERPL CALC-SCNC: 12 MMOL/L (ref 7–16)
BUN SERPL-MCNC: 9 MG/DL (ref 8–22)
CALCIUM SERPL-MCNC: 8.8 MG/DL (ref 8.5–10.5)
CHLORIDE SERPL-SCNC: 101 MMOL/L (ref 96–112)
CO2 SERPL-SCNC: 27 MMOL/L (ref 20–33)
CREAT SERPL-MCNC: 0.6 MG/DL (ref 0.5–1.4)
GLUCOSE SERPL-MCNC: 97 MG/DL (ref 65–99)
POTASSIUM SERPL-SCNC: 3.9 MMOL/L (ref 3.6–5.5)
SODIUM SERPL-SCNC: 140 MMOL/L (ref 135–145)

## 2020-08-11 PROCEDURE — 80048 BASIC METABOLIC PNL TOTAL CA: CPT

## 2020-08-11 PROCEDURE — 700111 HCHG RX REV CODE 636 W/ 250 OVERRIDE (IP): Performed by: HOSPITALIST

## 2020-08-11 PROCEDURE — A9270 NON-COVERED ITEM OR SERVICE: HCPCS | Performed by: NURSE PRACTITIONER

## 2020-08-11 PROCEDURE — 36415 COLL VENOUS BLD VENIPUNCTURE: CPT

## 2020-08-11 PROCEDURE — 700102 HCHG RX REV CODE 250 W/ 637 OVERRIDE(OP): Performed by: HOSPITALIST

## 2020-08-11 PROCEDURE — A9270 NON-COVERED ITEM OR SERVICE: HCPCS | Performed by: HOSPITALIST

## 2020-08-11 PROCEDURE — 700111 HCHG RX REV CODE 636 W/ 250 OVERRIDE (IP): Performed by: NURSE PRACTITIONER

## 2020-08-11 PROCEDURE — 700101 HCHG RX REV CODE 250: Performed by: HOSPITALIST

## 2020-08-11 PROCEDURE — 770021 HCHG ROOM/CARE - ISO PRIVATE

## 2020-08-11 PROCEDURE — 700102 HCHG RX REV CODE 250 W/ 637 OVERRIDE(OP): Performed by: NURSE PRACTITIONER

## 2020-08-11 PROCEDURE — 99233 SBSQ HOSP IP/OBS HIGH 50: CPT | Performed by: INTERNAL MEDICINE

## 2020-08-11 PROCEDURE — 700102 HCHG RX REV CODE 250 W/ 637 OVERRIDE(OP): Performed by: INTERNAL MEDICINE

## 2020-08-11 PROCEDURE — A9270 NON-COVERED ITEM OR SERVICE: HCPCS | Performed by: INTERNAL MEDICINE

## 2020-08-11 PROCEDURE — 700105 HCHG RX REV CODE 258: Performed by: HOSPITALIST

## 2020-08-11 RX ORDER — AMOXICILLIN AND CLAVULANATE POTASSIUM 875; 125 MG/1; MG/1
1 TABLET, FILM COATED ORAL EVERY 12 HOURS
Status: COMPLETED | OUTPATIENT
Start: 2020-08-11 | End: 2020-08-16

## 2020-08-11 RX ADMIN — AMLODIPINE BESYLATE 5 MG: 5 TABLET ORAL at 06:40

## 2020-08-11 RX ADMIN — AMPICILLIN AND SULBACTAM 3 G: 2; 1 INJECTION, POWDER, FOR SOLUTION INTRAVENOUS at 11:31

## 2020-08-11 RX ADMIN — AMPICILLIN AND SULBACTAM 3 G: 2; 1 INJECTION, POWDER, FOR SOLUTION INTRAVENOUS at 06:41

## 2020-08-11 RX ADMIN — LORAZEPAM 2 MG: 2 INJECTION INTRAMUSCULAR; INTRAVENOUS at 20:18

## 2020-08-11 RX ADMIN — PIPERONYL BUTOXIDE, PYRETHRUM EXTRACT: 4; .33 SHAMPOO TOPICAL at 00:10

## 2020-08-11 RX ADMIN — Medication 400 MG: at 17:15

## 2020-08-11 RX ADMIN — HALOPERIDOL LACTATE 5 MG: 5 INJECTION, SOLUTION INTRAMUSCULAR at 18:23

## 2020-08-11 RX ADMIN — ACETAMINOPHEN 650 MG: 325 TABLET, FILM COATED ORAL at 06:41

## 2020-08-11 RX ADMIN — HALOPERIDOL LACTATE 5 MG: 5 INJECTION, SOLUTION INTRAMUSCULAR at 10:03

## 2020-08-11 RX ADMIN — OXYCODONE HYDROCHLORIDE 5 MG: 5 TABLET ORAL at 08:44

## 2020-08-11 RX ADMIN — LORAZEPAM 1 MG: 1 TABLET ORAL at 06:41

## 2020-08-11 RX ADMIN — LORAZEPAM 2 MG: 2 INJECTION INTRAMUSCULAR; INTRAVENOUS at 19:26

## 2020-08-11 RX ADMIN — Medication 1 MG: at 06:40

## 2020-08-11 RX ADMIN — LORAZEPAM 1 MG: 1 TABLET ORAL at 11:30

## 2020-08-11 RX ADMIN — AMPICILLIN AND SULBACTAM 3 G: 2; 1 INJECTION, POWDER, FOR SOLUTION INTRAVENOUS at 01:09

## 2020-08-11 RX ADMIN — AMOXICILLIN AND CLAVULANATE POTASSIUM 1 TABLET: 875; 125 TABLET, FILM COATED ORAL at 17:15

## 2020-08-11 RX ADMIN — LORAZEPAM 1 MG: 1 TABLET ORAL at 15:17

## 2020-08-11 RX ADMIN — THERA TABS 1 TABLET: TAB at 06:41

## 2020-08-11 RX ADMIN — NICOTINE POLACRILEX 2 MG: 2 GUM, CHEWING BUCCAL at 15:17

## 2020-08-11 RX ADMIN — LORAZEPAM 3 MG: 1 TABLET ORAL at 21:14

## 2020-08-11 RX ADMIN — OXYCODONE HYDROCHLORIDE 5 MG: 5 TABLET ORAL at 17:16

## 2020-08-11 RX ADMIN — Medication 400 MG: at 06:41

## 2020-08-11 RX ADMIN — LORAZEPAM 0.5 MG: 2 INJECTION INTRAMUSCULAR; INTRAVENOUS at 01:07

## 2020-08-11 RX ADMIN — Medication 100 MG: at 06:41

## 2020-08-11 RX ADMIN — LABETALOL HYDROCHLORIDE 10 MG: 5 INJECTION, SOLUTION INTRAVENOUS at 23:42

## 2020-08-11 RX ADMIN — ACETAMINOPHEN 650 MG: 325 TABLET, FILM COATED ORAL at 21:13

## 2020-08-11 RX ADMIN — ENOXAPARIN SODIUM 40 MG: 40 INJECTION SUBCUTANEOUS at 06:41

## 2020-08-11 RX ADMIN — LORAZEPAM 1.5 MG: 2 INJECTION INTRAMUSCULAR; INTRAVENOUS at 23:43

## 2020-08-11 ASSESSMENT — LIFESTYLE VARIABLES
ANXIETY: *
TREMOR: TREMOR NOT VISIBLE BUT CAN BE FELT, FINGERTIP TO FINGERTIP
PAROXYSMAL SWEATS: BARELY PERCEPTIBLE SWEATING. PALMS MOIST
NAUSEA AND VOMITING: NO NAUSEA AND NO VOMITING
AGITATION: MODERATELY FIDGETY AND RESTLESS
PAROXYSMAL SWEATS: BARELY PERCEPTIBLE SWEATING. PALMS MOIST
AGITATION: *
PAROXYSMAL SWEATS: BARELY PERCEPTIBLE SWEATING. PALMS MOIST
HEADACHE, FULLNESS IN HEAD: MILD
AUDITORY DISTURBANCES: NOT PRESENT
NAUSEA AND VOMITING: NO NAUSEA AND NO VOMITING
NAUSEA AND VOMITING: NO NAUSEA AND NO VOMITING
ORIENTATION AND CLOUDING OF SENSORIUM: CANNOT DO SERIAL ADDITIONS OR IS UNCERTAIN ABOUT DATE
TOTAL SCORE: VERY MILD ITCHING, PINS AND NEEDLES SENSATION, BURNING OR NUMBNESS
HEADACHE, FULLNESS IN HEAD: MILD
TREMOR: MODERATE TREMOR WITH ARMS EXTENDED
AGITATION: SOMEWHAT MORE THAN NORMAL ACTIVITY
AUDITORY DISTURBANCES: NOT PRESENT
PAROXYSMAL SWEATS: *
ORIENTATION AND CLOUDING OF SENSORIUM: DISORIENTED FOR PLACE AND / OR PERSON
ORIENTATION AND CLOUDING OF SENSORIUM: DISORIENTED FOR PLACE AND / OR PERSON
TREMOR: TREMOR NOT VISIBLE BUT CAN BE FELT, FINGERTIP TO FINGERTIP
AGITATION: *
VISUAL DISTURBANCES: NOT PRESENT
TOTAL SCORE: 10
ORIENTATION AND CLOUDING OF SENSORIUM: DATE DISORIENTATION BY MORE THAN TWO CALENDAR DAYS
NAUSEA AND VOMITING: NO NAUSEA AND NO VOMITING
ANXIETY: MODERATELY ANXIOUS OR GUARDED, SO ANXIETY IS INFERRED
TREMOR: MODERATE TREMOR WITH ARMS EXTENDED
AUDITORY DISTURBANCES: NOT PRESENT
AGITATION: *
ORIENTATION AND CLOUDING OF SENSORIUM: DATE DISORIENTATION BY MORE THAN TWO CALENDAR DAYS
TOTAL SCORE: VERY MILD ITCHING, PINS AND NEEDLES SENSATION, BURNING OR NUMBNESS
TOTAL SCORE: VERY MILD ITCHING, PINS AND NEEDLES SENSATION, BURNING OR NUMBNESS
AGITATION: *
VISUAL DISTURBANCES: NOT PRESENT
VISUAL DISTURBANCES: NOT PRESENT
AGITATION: *
AUDITORY DISTURBANCES: NOT PRESENT
NAUSEA AND VOMITING: NO NAUSEA AND NO VOMITING
AUDITORY DISTURBANCES: NOT PRESENT
HEADACHE, FULLNESS IN HEAD: MODERATE
TREMOR: *
PAROXYSMAL SWEATS: BEADS OF SWEAT OBVIOUS ON FOREHEAD
AGITATION: *
TOTAL SCORE: VERY MILD ITCHING, PINS AND NEEDLES SENSATION, BURNING OR NUMBNESS
AUDITORY DISTURBANCES: NOT PRESENT
ANXIETY: MILDLY ANXIOUS
TOTAL SCORE: 10
ORIENTATION AND CLOUDING OF SENSORIUM: DATE DISORIENTATION BY MORE THAN TWO CALENDAR DAYS
AUDITORY DISTURBANCES: NOT PRESENT
TOTAL SCORE: 10
TREMOR: TREMOR NOT VISIBLE BUT CAN BE FELT, FINGERTIP TO FINGERTIP
VISUAL DISTURBANCES: NOT PRESENT
TOTAL SCORE: 15
HEADACHE, FULLNESS IN HEAD: MODERATE
TREMOR: TREMOR NOT VISIBLE BUT CAN BE FELT, FINGERTIP TO FINGERTIP
ANXIETY: MODERATELY ANXIOUS OR GUARDED, SO ANXIETY IS INFERRED
HEADACHE, FULLNESS IN HEAD: VERY MILD
VISUAL DISTURBANCES: NOT PRESENT
ANXIETY: MILDLY ANXIOUS
TOTAL SCORE: 26
PAROXYSMAL SWEATS: BEADS OF SWEAT OBVIOUS ON FOREHEAD
ANXIETY: *
ANXIETY: *
NAUSEA AND VOMITING: NO NAUSEA AND NO VOMITING
ANXIETY: MILDLY ANXIOUS
NAUSEA AND VOMITING: NO NAUSEA AND NO VOMITING
HEADACHE, FULLNESS IN HEAD: VERY MILD
AGITATION: SOMEWHAT MORE THAN NORMAL ACTIVITY
TREMOR: MODERATE TREMOR WITH ARMS EXTENDED
VISUAL DISTURBANCES: NOT PRESENT
NAUSEA AND VOMITING: NO NAUSEA AND NO VOMITING
TOTAL SCORE: 19
AUDITORY DISTURBANCES: NOT PRESENT
ANXIETY: MILDLY ANXIOUS
PAROXYSMAL SWEATS: BARELY PERCEPTIBLE SWEATING. PALMS MOIST
PAROXYSMAL SWEATS: BARELY PERCEPTIBLE SWEATING. PALMS MOIST
TREMOR: TREMOR NOT VISIBLE BUT CAN BE FELT, FINGERTIP TO FINGERTIP
ORIENTATION AND CLOUDING OF SENSORIUM: DATE DISORIENTATION BY MORE THAN TWO CALENDAR DAYS
AUDITORY DISTURBANCES: NOT PRESENT
TOTAL SCORE: 26
ORIENTATION AND CLOUDING OF SENSORIUM: DATE DISORIENTATION BY MORE THAN TWO CALENDAR DAYS
HEADACHE, FULLNESS IN HEAD: VERY MILD
TOTAL SCORE: 10
TOTAL SCORE: 9
NAUSEA AND VOMITING: NO NAUSEA AND NO VOMITING
ORIENTATION AND CLOUDING OF SENSORIUM: DATE DISORIENTATION BY MORE THAN TWO CALENDAR DAYS
VISUAL DISTURBANCES: NOT PRESENT
HEADACHE, FULLNESS IN HEAD: VERY MILD
HEADACHE, FULLNESS IN HEAD: VERY MILD
PAROXYSMAL SWEATS: *

## 2020-08-11 NOTE — PROGRESS NOTES
Hospital Medicine Daily Progress Note    Date of Service  8/11/2020    Chief Complaint  LLE wound    Hospital Course   Mr. Varela is a 64-year-old male with PMH significant for homelessness, etoh use, tobacco dependence and chronic LLE wound who presented 8/7/2020 with LLE wound infection.  He was hospitalized at our facility in April and evaluated by orthopedic surgery who recommended wound care.  Wound cultures at that time grew MSSA and strep.  Patient reported losing his Medicaid card and having no transportation to wound clinic.  He was seen at [facility] earlier this week and prescribed ABX, however he has not filled the prescription.  US LLE negative for DVT.  Treated for sepsis with empiric ABX and wound care.      Interval Problem Update  - No acute events overnight, had some agitation this morning requiring haldol and ativan  - Was found to have lice, s/p first round of treatment 8/10  - CIWA scores improving 13-->9  - On unasyn    Consultants/Specialty  Psychiatry    Code Status  Full Code    Disposition  Will need guardianship and placement    Review of Systems  Review of Systems   Unable to perform ROS: Psychiatric disorder        Physical Exam  Temp:  [36.3 °C (97.3 °F)-36.9 °C (98.4 °F)] 36.7 °C (98.1 °F)  Pulse:  [] 100  Resp:  [17-20] 20  BP: (135-169)/(84-96) 135/88  SpO2:  [94 %-97 %] 94 %    Physical Exam  Vitals signs and nursing note reviewed. Exam conducted with a chaperone present.   Constitutional:       General: He is awake. He is not in acute distress.     Appearance: He is underweight. He is ill-appearing.      Comments: Disheveled. Unkempt   HENT:      Nose: Nose normal.      Mouth/Throat:      Lips: Pink.      Mouth: Mucous membranes are moist.   Eyes:      General: No scleral icterus.     Conjunctiva/sclera: Conjunctivae normal.   Neck:      Musculoskeletal: Normal range of motion.   Cardiovascular:      Rate and Rhythm: Normal rate and regular rhythm.      Heart  "sounds: Normal heart sounds.   Pulmonary:      Effort: Pulmonary effort is normal.      Breath sounds: Normal breath sounds.   Abdominal:      General: Bowel sounds are normal.      Palpations: Abdomen is soft.      Tenderness: There is no abdominal tenderness. There is no guarding or rebound.   Musculoskeletal:      Right lower leg: No edema.      Left lower leg: No edema.      Comments: Dressing CDI   Skin:     General: Skin is warm and dry.   Neurological:      Mental Status: He is alert.      Sensory: Sensation is intact.      Motor: Motor function is intact.      Comments: Oriented x 2 (person, time, thought he is in \"administrative office\")   Psychiatric:         Mood and Affect: Mood is anxious. Affect is labile.         Behavior: Behavior is uncooperative and agitated (intermittently hitting himself in the head). Behavior is not aggressive or combative.      Comments:            Fluids    Intake/Output Summary (Last 24 hours) at 8/11/2020 1320  Last data filed at 8/11/2020 1253  Gross per 24 hour   Intake 2100 ml   Output --   Net 2100 ml       Laboratory  Recent Labs     08/09/20  0122   WBC 6.6   RBC 3.48*   HEMOGLOBIN 10.4*   HEMATOCRIT 31.7*   MCV 91.1   MCH 29.9   MCHC 32.8*   RDW 56.7*   PLATELETCT 383   MPV 9.0     Recent Labs     08/09/20  0122 08/10/20  0717 08/11/20  0718   SODIUM 137 139 140   POTASSIUM 3.5* 3.9 3.9   CHLORIDE 102 99 101   CO2 22 26 27   GLUCOSE 120* 95 97   BUN 8 6* 9   CREATININE 0.64 0.68 0.60   CALCIUM 8.2* 8.8 8.8                   Imaging  CT-HEAD W/O   Final Result      No acute intracranial abnormality      DX-TIBIA AND FIBULA LEFT   Final Result      1.  Healed distal metaphyseal fractures of the left fibula and tibia with tibial IM layla in place.      2.  No acute fracture or dislocation.      3.  No radiopaque soft tissue foreign body.      DX-FEMUR-2+ LEFT   Final Result      1.  No radiographic evidence of acute traumatic injury left femur.      2.  Unchanged cortical " thickening in the posterior aspect of the distal femoral diaphysis which may represent sequela of prior injury or infection.      3.  No soft tissue foreign body identified.      US-EXTREMITY VENOUS LOWER UNILAT LEFT   Final Result      DX-CHEST-PORTABLE (1 VIEW)   Final Result      No acute cardiopulmonary abnormality.           Assessment/Plan  Open wound of left lower leg- (present on admission)  Assessment & Plan  -chronic  -poor compliance with OP wound care. He can't remember the last time he had his dressing changed OP  -wound care  -wound culture pending - Previous cultures grew MSSA and group A strep.  Preliminary wound cultures from this hospitalization also growing MSSA and group A strep  -blood cultures (-) to date  -afebrile. Leukocytosis resolved  -continue ABX unasyn 8/7-8/11 -->augmentin 8/11, likely 10-14 day course  -LLE US (-) DVT    Hypomagnesemia  Assessment & Plan  -Likely related to EtOH abuse history  -Daily supplementation  -Follow labs as clinically indicated    Hypokalemia- (present on admission)  Assessment & Plan  -Also with hypomagnesemia  -Resolved after replacement    Flexion contracture of knee, 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)  Assessment & Plan  -secondary to chronic wound in that area  -PT OT  -encourage mobility      Encephalopathy- (present on admission)  Assessment & Plan  -acute on chronic  -likely due to significant EtOH history.  Possible component of Warnicke's encephalopathy  -Psychiatry evaluated him and deemed him incapacitated to make medical decision or leave AMA and recommend SLP cognitive eval.  Patient will likely require guardianship and placement  -CT head unremarkable  -no acute neurological deficits  -Avoid narcotics and hypnotics.  Cautious use of benzodiazepines for alcohol withdrawal protocol  -Frequent reorientation  -Sleep hygiene      Non-compliance  Assessment & Plan  -likely also component of psychiatric disorder and ETOH abuse  -Psychiatric consult today  "suggests patient is incapacitated to make medical decisions or leave AMA  -ongoing encouragement for compliance.    Psychiatric disorder  Assessment & Plan  -unknown/unspecified  -not currently on medication  -history of severe alcohol dependence.  -psychiatry consult today - incapacitated to leave AMA or make medical decisions      Alcohol dependence (HCC)- (present on admission)  Assessment & Plan  -history of.  Ongoing.  He reports drinking \"once in a while\"  -withdrawal previous hospitalizations  -with anxiety  -CIWA 6-21  -seizure precautions  -MVI and thiamine    Protein malnutrition (HCC)  Assessment & Plan  -history of ETOH and homelessness  -dietary consult  -TID supplements  -encourage PO intake    Sepsis (HCC)- (present on admission)  Assessment & Plan  -This is Sepsis Present on admission  SIRS criteria identified on my evaluation include: Leukocytosis, with WBC greater than 12,000   Source is cellulitis and wound infection  Sepsis protocol initiated  Fluid resuscitation ordered per protocol  IV antibiotics as appropriate for source of sepsis  While organ dysfunction may be noted elsewhere in this problem list or in the chart, degree of organ dysfunction does not meet CMS criteria for severe sepsis  -afebrile  -lactic WNL  -blood cultures negative to date  -Sepsis has resolved        Tobacco dependence- (present on admission)  Assessment & Plan  -Nicotine replacement protocol         VTE prophylaxis: Enoxaparin      I have performed a physical exam and reviewed and updated ROS and Assessment/Plan today (08/10/20). In review of the previous note there are no changes except as documented above    Please note that this dictation was created using voice recognition software. I have made every reasonable attempt to correct obvious errors, but there may be errors of grammar and possibly content that I did not discover before finalizing the note.    Isaura Tim M.D.        "

## 2020-08-11 NOTE — CARE PLAN
Problem: Safety  Goal: Will remain free from falls  Outcome: PROGRESSING AS EXPECTED  Note:  Treaded socks in place, Bed in low, locked position.  Side rails up x2.  Call light within reach,  Patient does not call appropriately,  CNA sitter in place.      Problem: Psychosocial Needs:  Goal: Level of anxiety will decrease  Outcome: PROGRESSING SLOWER THAN EXPECTED  Flowsheets (Taken 8/11/2020 1056)  Patient Behaviors:   Agitated   Anxious   Confused  Note:  Patient was slightly agitated and with some confusion and anxiety this AM about why he was here and why he couldn't walk the hallways.  Patient was educated on reason why he couldn't leave room and Medication was given for the Agitation and hitting himself.

## 2020-08-11 NOTE — PROGRESS NOTES
Dressing on left leg changed per order.  Patient tolerated well. Wound is following and will see patient  8/12/20

## 2020-08-11 NOTE — PROGRESS NOTES
Called to patients room by CNA sitter as patient was getting agitated and hitting himself in the head.  Patient was  Confused as to why he was here and was wanting to leave his room.  Patient was redirected and reoriented .  PT was told why he was here and why he couldn't leave his room, wound check and Lice.  Patient was  Given haldol per MAR for continued agitation.   Will continue to monitor.

## 2020-08-12 PROBLEM — B85.0 HEAD LICE: Status: ACTIVE | Noted: 2020-08-12

## 2020-08-12 LAB
BACTERIA BLD CULT: NORMAL
CRP SERPL HS-MCNC: 1.07 MG/DL (ref 0–0.75)
SIGNIFICANT IND 70042: NORMAL
SITE SITE: NORMAL
SOURCE SOURCE: NORMAL

## 2020-08-12 PROCEDURE — 700111 HCHG RX REV CODE 636 W/ 250 OVERRIDE (IP): Performed by: HOSPITALIST

## 2020-08-12 PROCEDURE — A9270 NON-COVERED ITEM OR SERVICE: HCPCS | Performed by: NURSE PRACTITIONER

## 2020-08-12 PROCEDURE — 700102 HCHG RX REV CODE 250 W/ 637 OVERRIDE(OP): Performed by: HOSPITALIST

## 2020-08-12 PROCEDURE — A9270 NON-COVERED ITEM OR SERVICE: HCPCS | Performed by: INTERNAL MEDICINE

## 2020-08-12 PROCEDURE — 700102 HCHG RX REV CODE 250 W/ 637 OVERRIDE(OP): Performed by: NURSE PRACTITIONER

## 2020-08-12 PROCEDURE — 99232 SBSQ HOSP IP/OBS MODERATE 35: CPT | Performed by: INTERNAL MEDICINE

## 2020-08-12 PROCEDURE — 700102 HCHG RX REV CODE 250 W/ 637 OVERRIDE(OP): Performed by: INTERNAL MEDICINE

## 2020-08-12 PROCEDURE — A9270 NON-COVERED ITEM OR SERVICE: HCPCS | Performed by: HOSPITALIST

## 2020-08-12 PROCEDURE — 36415 COLL VENOUS BLD VENIPUNCTURE: CPT

## 2020-08-12 PROCEDURE — 86140 C-REACTIVE PROTEIN: CPT

## 2020-08-12 PROCEDURE — 700111 HCHG RX REV CODE 636 W/ 250 OVERRIDE (IP): Performed by: NURSE PRACTITIONER

## 2020-08-12 PROCEDURE — 770021 HCHG ROOM/CARE - ISO PRIVATE

## 2020-08-12 RX ORDER — CLONIDINE HYDROCHLORIDE 0.1 MG/1
0.1 TABLET ORAL EVERY 6 HOURS PRN
Status: DISCONTINUED | OUTPATIENT
Start: 2020-08-12 | End: 2020-09-09

## 2020-08-12 RX ADMIN — Medication 1 MG: at 08:23

## 2020-08-12 RX ADMIN — LORAZEPAM 1 MG: 1 TABLET ORAL at 21:12

## 2020-08-12 RX ADMIN — HALOPERIDOL LACTATE 5 MG: 5 INJECTION, SOLUTION INTRAMUSCULAR at 00:26

## 2020-08-12 RX ADMIN — Medication 400 MG: at 08:23

## 2020-08-12 RX ADMIN — THERA TABS 1 TABLET: TAB at 08:23

## 2020-08-12 RX ADMIN — LORAZEPAM 2 MG: 1 TABLET ORAL at 12:49

## 2020-08-12 RX ADMIN — AMOXICILLIN AND CLAVULANATE POTASSIUM 1 TABLET: 875; 125 TABLET, FILM COATED ORAL at 17:53

## 2020-08-12 RX ADMIN — ENOXAPARIN SODIUM 40 MG: 40 INJECTION SUBCUTANEOUS at 06:43

## 2020-08-12 RX ADMIN — LORAZEPAM 1.5 MG: 2 INJECTION INTRAMUSCULAR; INTRAVENOUS at 03:22

## 2020-08-12 RX ADMIN — DOCUSATE SODIUM 50 MG AND SENNOSIDES 8.6 MG 2 TABLET: 8.6; 5 TABLET, FILM COATED ORAL at 17:53

## 2020-08-12 RX ADMIN — LORAZEPAM 2 MG: 1 TABLET ORAL at 17:53

## 2020-08-12 RX ADMIN — Medication 100 MG: at 08:23

## 2020-08-12 RX ADMIN — AMLODIPINE BESYLATE 5 MG: 5 TABLET ORAL at 08:23

## 2020-08-12 RX ADMIN — LORAZEPAM 1 MG: 2 INJECTION INTRAMUSCULAR; INTRAVENOUS at 10:26

## 2020-08-12 RX ADMIN — LORAZEPAM 2 MG: 1 TABLET ORAL at 15:04

## 2020-08-12 RX ADMIN — DOCUSATE SODIUM 50 MG AND SENNOSIDES 8.6 MG 2 TABLET: 8.6; 5 TABLET, FILM COATED ORAL at 08:23

## 2020-08-12 RX ADMIN — AMOXICILLIN AND CLAVULANATE POTASSIUM 1 TABLET: 875; 125 TABLET, FILM COATED ORAL at 08:23

## 2020-08-12 RX ADMIN — Medication 400 MG: at 17:53

## 2020-08-12 ASSESSMENT — LIFESTYLE VARIABLES
PAROXYSMAL SWEATS: BARELY PERCEPTIBLE SWEATING. PALMS MOIST
TREMOR: *
ANXIETY: *
TREMOR: NO TREMOR
AGITATION: MODERATELY FIDGETY AND RESTLESS
PAROXYSMAL SWEATS: NO SWEAT VISIBLE
NAUSEA AND VOMITING: NO NAUSEA AND NO VOMITING
HEADACHE, FULLNESS IN HEAD: MILD
PAROXYSMAL SWEATS: BARELY PERCEPTIBLE SWEATING. PALMS MOIST
AUDITORY DISTURBANCES: NOT PRESENT
ANXIETY: MODERATELY ANXIOUS OR GUARDED, SO ANXIETY IS INFERRED
ANXIETY: *
AGITATION: NORMAL ACTIVITY
AUDITORY DISTURBANCES: NOT PRESENT
NAUSEA AND VOMITING: NO NAUSEA AND NO VOMITING
AUDITORY DISTURBANCES: NOT PRESENT
ORIENTATION AND CLOUDING OF SENSORIUM: DISORIENTED FOR PLACE AND / OR PERSON
AUDITORY DISTURBANCES: NOT PRESENT
AGITATION: MODERATELY FIDGETY AND RESTLESS
PAROXYSMAL SWEATS: NO SWEAT VISIBLE
TOTAL SCORE: 13
HEADACHE, FULLNESS IN HEAD: NOT PRESENT
VISUAL DISTURBANCES: NOT PRESENT
AUDITORY DISTURBANCES: NOT PRESENT
AGITATION: MODERATELY FIDGETY AND RESTLESS
PAROXYSMAL SWEATS: NO SWEAT VISIBLE
TREMOR: *
TREMOR: *
HEADACHE, FULLNESS IN HEAD: NOT PRESENT
VISUAL DISTURBANCES: NOT PRESENT
ORIENTATION AND CLOUDING OF SENSORIUM: DISORIENTED FOR PLACE AND / OR PERSON
VISUAL DISTURBANCES: NOT PRESENT
PAROXYSMAL SWEATS: NO SWEAT VISIBLE
HEADACHE, FULLNESS IN HEAD: NOT PRESENT
ORIENTATION AND CLOUDING OF SENSORIUM: DISORIENTED FOR PLACE AND / OR PERSON
ANXIETY: MILDLY ANXIOUS
ANXIETY: MODERATELY ANXIOUS OR GUARDED, SO ANXIETY IS INFERRED
VISUAL DISTURBANCES: NOT PRESENT
ORIENTATION AND CLOUDING OF SENSORIUM: DISORIENTED FOR PLACE AND / OR PERSON
TREMOR: MODERATE TREMOR WITH ARMS EXTENDED
TOTAL SCORE: 11
HEADACHE, FULLNESS IN HEAD: NOT PRESENT
NAUSEA AND VOMITING: NO NAUSEA AND NO VOMITING
NAUSEA AND VOMITING: NO NAUSEA AND NO VOMITING
PAROXYSMAL SWEATS: BARELY PERCEPTIBLE SWEATING. PALMS MOIST
VISUAL DISTURBANCES: NOT PRESENT
VISUAL DISTURBANCES: NOT PRESENT
ORIENTATION AND CLOUDING OF SENSORIUM: DISORIENTED FOR PLACE AND / OR PERSON
ANXIETY: MILDLY ANXIOUS
ORIENTATION AND CLOUDING OF SENSORIUM: DISORIENTED FOR PLACE AND / OR PERSON
AGITATION: *
TOTAL SCORE: 14
TOTAL SCORE: 10
ORIENTATION AND CLOUDING OF SENSORIUM: DISORIENTED FOR PLACE AND / OR PERSON
TOTAL SCORE: 15
TREMOR: NO TREMOR
AGITATION: SOMEWHAT MORE THAN NORMAL ACTIVITY
NAUSEA AND VOMITING: NO NAUSEA AND NO VOMITING
TOTAL SCORE: 9
TREMOR: TREMOR NOT VISIBLE BUT CAN BE FELT, FINGERTIP TO FINGERTIP
PAROXYSMAL SWEATS: BARELY PERCEPTIBLE SWEATING. PALMS MOIST
ANXIETY: MODERATELY ANXIOUS OR GUARDED, SO ANXIETY IS INFERRED
ANXIETY: MILDLY ANXIOUS
TOTAL SCORE: 13
AUDITORY DISTURBANCES: NOT PRESENT
TOTAL SCORE: VERY MILD ITCHING, PINS AND NEEDLES SENSATION, BURNING OR NUMBNESS
ORIENTATION AND CLOUDING OF SENSORIUM: DISORIENTED FOR PLACE AND / OR PERSON
NAUSEA AND VOMITING: NO NAUSEA AND NO VOMITING
HEADACHE, FULLNESS IN HEAD: NOT PRESENT
NAUSEA AND VOMITING: NO NAUSEA AND NO VOMITING
AGITATION: *
VISUAL DISTURBANCES: NOT PRESENT
NAUSEA AND VOMITING: NO NAUSEA AND NO VOMITING
AUDITORY DISTURBANCES: NOT PRESENT
HEADACHE, FULLNESS IN HEAD: NOT PRESENT
ANXIETY: *
NAUSEA AND VOMITING: NO NAUSEA AND NO VOMITING
AGITATION: *
TOTAL SCORE: 15
TREMOR: *
HEADACHE, FULLNESS IN HEAD: NOT PRESENT
AGITATION: SOMEWHAT MORE THAN NORMAL ACTIVITY
AUDITORY DISTURBANCES: NOT PRESENT
PAROXYSMAL SWEATS: NO SWEAT VISIBLE
AGITATION: *
VISUAL DISTURBANCES: NOT PRESENT
ANXIETY: *
ORIENTATION AND CLOUDING OF SENSORIUM: DISORIENTED FOR PLACE AND / OR PERSON
VISUAL DISTURBANCES: NOT PRESENT
VISUAL DISTURBANCES: NOT PRESENT
TOTAL SCORE: 13
TOTAL SCORE: 6
ORIENTATION AND CLOUDING OF SENSORIUM: DISORIENTED FOR PLACE AND / OR PERSON
TREMOR: TREMOR NOT VISIBLE BUT CAN BE FELT, FINGERTIP TO FINGERTIP
NAUSEA AND VOMITING: NO NAUSEA AND NO VOMITING
PAROXYSMAL SWEATS: BARELY PERCEPTIBLE SWEATING. PALMS MOIST
TREMOR: *
HEADACHE, FULLNESS IN HEAD: MODERATELY SEVERE
HEADACHE, FULLNESS IN HEAD: NOT PRESENT

## 2020-08-12 NOTE — CARE PLAN
Problem: Safety  Goal: Will remain free from falls  Outcome: PROGRESSING AS EXPECTED  Intervention: Implement fall precautions  Flowsheets  Taken 8/12/2020 0841  Bed Alarm: Alarm Not On  Bedrails: Bedrails Closest to Bathroom Down  Taken 8/12/2020 0803  Environmental Precautions:   Treaded Slipper Socks on Patient   Personal Belongings, Wastebasket, Call Bell etc. in Easy Reach   Transferred to Stronger Side   Report Given to Other Health Care Providers Regarding Fall Risk   Bed in Low Position   Communication Sign for Patients & Families   Mobility Assessed & Appropriate Sign Placed     Problem: Infection  Goal: Will remain free from infection  Outcome: PROGRESSING AS EXPECTED  Intervention: Assess signs and symptoms of infection  Note: Monitor for s/s of infection in labs, assessment, and vitals

## 2020-08-12 NOTE — PROGRESS NOTES
0720: Assumed care of pt after report. Pt sleeping, no s/s of distress noted. 1:1 sitter present    0823: Pt at edge of bed eating breakfast. Administered AM medications he previously refused. Assessment done. Pt calm at the moment. Will continue to monitor.

## 2020-08-12 NOTE — DISCHARGE PLANNING
Anticipated Discharge Disposition: TBD    Action: pt case brought to  leadership for assistance.  Pt will need guardianship and placement.  [facility] search turned up no relatives per Tati Tsai.     Barriers to Discharge: Gaurdianship, placement    Plan: f/u with medical team, case management leadership for assistance.

## 2020-08-12 NOTE — ASSESSMENT & PLAN NOTE
-Lice found for 2nd time this admission.   -head shaved and shampoo ordered   -Monitor for improvement

## 2020-08-12 NOTE — PROGRESS NOTES
Hospital Medicine Daily Progress Note    Date of Service  8/12/2020    Chief Complaint  LLE wound    Hospital Course   Mr. Varela is a 64-year-old male with PMH significant for homelessness, etoh use, tobacco dependence and chronic LLE wound who presented 8/7/2020 with LLE wound infection.  He was hospitalized at our facility in April and evaluated by orthopedic surgery who recommended wound care.  Wound cultures at that time grew MSSA and strep.  Patient reported losing his Medicaid card and having no transportation to wound clinic.  He was seen at [facility] earlier this week and prescribed ABX, however he has not filled the prescription.  US LLE negative for DVT.  Treated for sepsis with empiric ABX and wound care.      Interval Problem Update  - No acute events overnight  - Had some higher CIWA scores overnight but now improving 19-->9  - Hypertensive during episodes of agitation, refused BP meds  - Wound care came to see patient today  - CRP improving 13-->1  - Transitioned to augmentin    Consultants/Specialty  Psychiatry    Code Status  Full Code    Disposition  Will need guardianship and placement    Review of Systems  Review of Systems   Unable to perform ROS: Psychiatric disorder        Physical Exam  Temp:  [36.3 °C (97.3 °F)-36.8 °C (98.3 °F)] 36.3 °C (97.3 °F)  Pulse:  [64-88] 80  Resp:  [16-20] 17  BP: (126-185)/() 148/96  SpO2:  [95 %-100 %] 100 %    Physical Exam  Vitals signs and nursing note reviewed. Exam conducted with a chaperone present.   Constitutional:       General: He is awake. He is not in acute distress.     Appearance: He is underweight. He is ill-appearing.      Comments: Disheveled. Unkempt   HENT:      Nose: Nose normal.      Mouth/Throat:      Lips: Pink.      Mouth: Mucous membranes are moist.   Eyes:      General: No scleral icterus.     Conjunctiva/sclera: Conjunctivae normal.   Neck:      Musculoskeletal: Normal range of motion.   Cardiovascular:      Rate and  Rhythm: Normal rate and regular rhythm.      Heart sounds: Normal heart sounds.   Pulmonary:      Effort: Pulmonary effort is normal.      Breath sounds: Normal breath sounds.   Abdominal:      General: Bowel sounds are normal.      Palpations: Abdomen is soft.      Tenderness: There is no abdominal tenderness. There is no guarding or rebound.   Musculoskeletal:      Right lower leg: No edema.      Left lower leg: No edema.      Comments: Dressing CDI   Skin:     General: Skin is warm and dry.   Neurological:      Mental Status: He is alert.      Sensory: Sensation is intact.      Motor: Motor function is intact.      Comments: Oriented x 2    Psychiatric:         Mood and Affect: Mood and affect normal.         Behavior: Behavior is not agitated, aggressive or combative.      Comments:            Fluids    Intake/Output Summary (Last 24 hours) at 8/12/2020 1655  Last data filed at 8/12/2020 1242  Gross per 24 hour   Intake 1270 ml   Output --   Net 1270 ml       Laboratory      Recent Labs     08/10/20  0717 08/11/20  0718   SODIUM 139 140   POTASSIUM 3.9 3.9   CHLORIDE 99 101   CO2 26 27   GLUCOSE 95 97   BUN 6* 9   CREATININE 0.68 0.60   CALCIUM 8.8 8.8                   Imaging  CT-HEAD W/O   Final Result      No acute intracranial abnormality      DX-TIBIA AND FIBULA LEFT   Final Result      1.  Healed distal metaphyseal fractures of the left fibula and tibia with tibial IM layla in place.      2.  No acute fracture or dislocation.      3.  No radiopaque soft tissue foreign body.      DX-FEMUR-2+ LEFT   Final Result      1.  No radiographic evidence of acute traumatic injury left femur.      2.  Unchanged cortical thickening in the posterior aspect of the distal femoral diaphysis which may represent sequela of prior injury or infection.      3.  No soft tissue foreign body identified.      US-EXTREMITY VENOUS LOWER UNILAT LEFT   Final Result      DX-CHEST-PORTABLE (1 VIEW)   Final Result      No acute  cardiopulmonary abnormality.           Assessment/Plan  Open wound of left lower leg- (present on admission)  Assessment & Plan  -chronic  -poor compliance with OP wound care. He can't remember the last time he had his dressing changed OP  -wound care  -wound culture pending - Previous cultures grew MSSA and group A strep.  Preliminary wound cultures from this hospitalization also growing MSSA and group A strep  -blood cultures (-) to date  -afebrile. Leukocytosis resolved  -continue ABX unasyn 8/7-8/11 -->augmentin 8/11, likely 10-14 day course  -LLE US (-) DVT    Hypomagnesemia  Assessment & Plan  -Likely related to EtOH abuse history  -Daily supplementation  -Follow labs as clinically indicated    Hypokalemia- (present on admission)  Assessment & Plan  -Also with hypomagnesemia  -Resolved after replacement    Flexion contracture of knee, 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)  Assessment & Plan  -secondary to chronic wound in that area  -PT OT  -encourage mobility      Head lice  Assessment & Plan  Head lice found  S/p first round of treatment 8/10, will need second treatment 8/17    Encephalopathy- (present on admission)  Assessment & Plan  -acute on chronic  -likely due to significant EtOH history.  Possible component of Warnicke's encephalopathy  -Psychiatry evaluated him and deemed him incapacitated to make medical decision or leave AMA and recommend SLP cognitive eval.  Patient will likely require guardianship and placement  -CT head unremarkable  -no acute neurological deficits  -Avoid narcotics and hypnotics.  Cautious use of benzodiazepines for alcohol withdrawal protocol  -Frequent reorientation  -Sleep hygiene      Non-compliance  Assessment & Plan  -likely also component of psychiatric disorder and ETOH abuse  -Psychiatric consult today suggests patient is incapacitated to make medical decisions or leave AMA  -ongoing encouragement for compliance.    Psychiatric disorder  Assessment &  "Plan  -unknown/unspecified  -not currently on medication  -history of severe alcohol dependence.  -psychiatry consult today - incapacitated to leave AMA or make medical decisions      Alcohol dependence (HCC)- (present on admission)  Assessment & Plan  -history of.  Ongoing.  He reports drinking \"once in a while\"  -withdrawal previous hospitalizations  -with anxiety  -CIWA 6-21  -seizure precautions  -MVI and thiamine    Protein malnutrition (HCC)  Assessment & Plan  -history of ETOH and homelessness  -dietary consult  -TID supplements  -encourage PO intake    Sepsis (HCC)- (present on admission)  Assessment & Plan  -This is Sepsis Present on admission  SIRS criteria identified on my evaluation include: Leukocytosis, with WBC greater than 12,000   Source is cellulitis and wound infection  Sepsis protocol initiated  Fluid resuscitation ordered per protocol  IV antibiotics as appropriate for source of sepsis  While organ dysfunction may be noted elsewhere in this problem list or in the chart, degree of organ dysfunction does not meet CMS criteria for severe sepsis  -afebrile  -lactic WNL  -blood cultures negative to date  -Sepsis has resolved        Tobacco dependence- (present on admission)  Assessment & Plan  -Nicotine replacement protocol         VTE prophylaxis: Enoxaparin      I have performed a physical exam and reviewed and updated ROS and Assessment/Plan today (08/10/20). In review of the previous note there are no changes except as documented above    Please note that this dictation was created using voice recognition software. I have made every reasonable attempt to correct obvious errors, but there may be errors of grammar and possibly content that I did not discover before finalizing the note.    Isaura Tim M.D.        "

## 2020-08-12 NOTE — PROGRESS NOTES
Patient  Becoming agitated and anxious again about wanting to leave room and walk around.  Patient educated again on why he can't leave the room and  Attempted to re-direct patient.  Patient started to hit himself in the head again.  Patient given Haldol per MAR for continued agitation/anxiety.

## 2020-08-12 NOTE — WOUND TEAM
[facility]  Inpatient Services  Wound and Skin Care Progress Note    Admission Date: 8/7/2020     Last order of IP CONSULT TO WOUND CARE was found on 8/7/2020 from Hospital Encounter on 8/7/2020     HPI, PMH, SH: Reviewed    Unit where seen by Wound Team: [id number]/00     WOUND CONSULT/FOLLOW UP RELATED TO:  Left leg chronic wound      Self Report / Pain Level:  No s/s of pain        OBJECTIVE:  In bed. Intact dressing in place.      WOUND TYPE, LOCATION, CHARACTERISTICS (Pressure Injuries: location, stage, POA or date identified)    Wound 08/07/20 Full Thickness Wound Leg Outer;Inner Left medial extending posterior  (Active)   Wound Image    08/09/20 1600   Site Assessment Red;[place] 08/12/20 1000   Periwound Assessment Clean;Dry;Intact 08/12/20 1000   Margins Epibole (rolled edges);Defined edges;Attached edges 08/12/20 1000   Closure Secondary intention 08/12/20 1000   Drainage Amount Small 08/12/20 1000   Drainage Description Serosanguineous 08/12/20 1000   Treatments Cleansed;Irrigation;Site care 08/12/20 1000   Wound Cleansing Approved Wound Cleanser 08/12/20 1000   Periwound Protectant Not Applicable 08/12/20 1000   Dressing Cleansing/Solutions Not Applicable 08/12/20 1000   Dressing Options Collagen Dressing;Hydrofiber Silver;Mepilex Heel 08/12/20 1000   Dressing Changed Changed 08/12/20 1000   Dressing Status Clean;Dry;Intact 08/12/20 1000   Dressing Change/Treatment Frequency By Wound Team Only 08/12/20 1000   NEXT Dressing Change/Treatment Date 08/16/20 08/12/20 1000   NEXT Weekly Photo (Inpatient Only) 08/16/20 08/11/20 0838   Non-staged Wound Description Full thickness 08/12/20 1000   Wound Length (cm) 4.5 cm 08/09/20 1600   Wound Width (cm) 39 cm 08/09/20 1600   Wound Depth (cm) 0.3 cm 08/09/20 1600   Wound Surface Area (cm^2) 175.5 cm^2 08/09/20 1600   Wound Volume (cm^3) 52.65 cm^3 08/09/20 1600   Shape Irregular 08/12/20 1000   Wound Odor None 08/12/20 1000   Exposed Structures None 08/12/20  1000   WOUND NURSE ONLY - Time Spent with Patient (mins) 90 08/09/20 1600            Vascular:    KOSTAS:   No results found.    Lab Values:    Lab Results   Component Value Date/Time    WBC 6.6 08/09/2020 01:22 AM    RBC 3.48 (L) 08/09/2020 01:22 AM    HEMOGLOBIN 10.4 (L) 08/09/2020 01:22 AM    HEMATOCRIT 31.7 (L) 08/09/2020 01:22 AM    CREACTPROT 13.54 (H) 08/07/2020 01:20 PM    SEDRATEWES 85 (H) 08/07/2020 01:20 PM    HBA1C 5.7 (H) 11/09/2018 06:30 PM        Culture Results show:  Recent Results (from the past 720 hour(s))   CULTURE WOUND W/ GRAM STAIN    Collection Time: 08/07/20  1:20 PM    Specimen: Left Leg; Wound   Result Value Ref Range    Significant Indicator POS (POS)     Source WND     Site LEFT LEG     Culture Result Light growth mixed skin lotus. (A)     Gram Stain Result       Few Gram positive rods.  Few Gram positive cocci.  Few WBCs.      Culture Result (A)      Staphylococcus aureus  Moderate growth  Methicillin sensitive via screening method  This isolate is presumed to be clindamycin resistant based on  detection of inducible resistance.  Clindamycin may still  be effective in some patients.      Culture Result (A)      Beta Hemolytic Streptococcus group A  Moderate growth         Susceptibility    Staphylococcus aureus - CATHERINE     Azithromycin >4 Resistant mcg/mL     Clindamycin <=0.5 Resistant mcg/mL     Cefazolin <=8 Sensitive mcg/mL     Ceftaroline <=0.5 Sensitive mcg/mL     Daptomycin <=1 Sensitive mcg/mL     Ampicillin/sulbactam <=8/4 Sensitive mcg/mL     Erythromycin >4 Resistant mcg/mL     Vancomycin 1 Sensitive mcg/mL     Oxacillin <=0.25 Sensitive mcg/mL     Penicillin >8 Resistant mcg/mL     Pip/Tazobactam <=4 Sensitive mcg/mL     Trimeth/Sulfa <=0.5/9.5 Sensitive mcg/mL     Tetracycline <=4 Sensitive mcg/mL       INTERVENTIONS BY WOUND TEAM:  Chart reviewed, patient dressing was soaked after taking shower, patient has gauze, non-adhesive foam and rolled gauze to left LE.  Removed  dressings, cleansed wound with wound cleanser and gauze.  Patted dry with gauze.  Epibole edges near the lateral and proximal edge of wound to medial side, was not able to tolerate CSWD.   Covered wound with agata moistened with NS.  Hydrofiber Ag to wound bed for drainage and antibacterial.  Secured with mepilex heel x2.  Patient states that he wants to take a shower after wound RN was finished with dressing change.  Educated patient that the dressing is water resistant but not water proof.  Advised patient to take a short shower, and try to keep the water spray of the dressing.      Patient pulled off dressing, wound care RN reapplied dressings.     Interdisciplinary consultation: Patient, Bedside RN (Darshana)     EVALUATION: Patient seen on previous admit for same wound.  Per pt, wound was obtained after a traumatic fall about 2 years ago.  Patient has been non compliant with follow up.  Agata for antimicrobial and assist wound through inflammatory.  Aquacel Ag to manage bioburden, absorb exudate, and maintain moist wound environment without laterally wicking exudate therefore reducing addison-wound maceration.      Goals: Steady decrease in wound area and depth weekly.    NURSING PLAN OF CARE ORDERS (X):    Dressing changes: See Dressing Care orders: dressing changes per wound team  Skin care: See Skin Care orders: moves self   Rectal tube care: See Rectal Tube Care orders:   Other orders:    WOUND TEAM PLAN OF CARE:   Dressing changes by wound team:                 X  Follow up 3 times weekly:                NPWT change 3 times weekly:     Follow up 1-2 times weekly:      Follow up Bi-Monthly:                   Follow up as needed:       Other (explain):     Anticipated discharge plans: will need ongoing wound care post DC - non compliant   LTACH:        SNF/Rehab:                  Home Health Care:           Outpatient Wound Center:          Self Care:

## 2020-08-13 PROCEDURE — A9270 NON-COVERED ITEM OR SERVICE: HCPCS | Performed by: HOSPITALIST

## 2020-08-13 PROCEDURE — 700102 HCHG RX REV CODE 250 W/ 637 OVERRIDE(OP): Performed by: NURSE PRACTITIONER

## 2020-08-13 PROCEDURE — 700111 HCHG RX REV CODE 636 W/ 250 OVERRIDE (IP): Performed by: NURSE PRACTITIONER

## 2020-08-13 PROCEDURE — 700102 HCHG RX REV CODE 250 W/ 637 OVERRIDE(OP): Performed by: INTERNAL MEDICINE

## 2020-08-13 PROCEDURE — A9270 NON-COVERED ITEM OR SERVICE: HCPCS | Performed by: NURSE PRACTITIONER

## 2020-08-13 PROCEDURE — 700111 HCHG RX REV CODE 636 W/ 250 OVERRIDE (IP): Performed by: HOSPITALIST

## 2020-08-13 PROCEDURE — 700102 HCHG RX REV CODE 250 W/ 637 OVERRIDE(OP): Performed by: HOSPITALIST

## 2020-08-13 PROCEDURE — A9270 NON-COVERED ITEM OR SERVICE: HCPCS | Performed by: INTERNAL MEDICINE

## 2020-08-13 PROCEDURE — 99232 SBSQ HOSP IP/OBS MODERATE 35: CPT | Performed by: INTERNAL MEDICINE

## 2020-08-13 PROCEDURE — 99223 1ST HOSP IP/OBS HIGH 75: CPT | Performed by: PSYCHIATRY & NEUROLOGY

## 2020-08-13 PROCEDURE — 770021 HCHG ROOM/CARE - ISO PRIVATE

## 2020-08-13 RX ADMIN — Medication 400 MG: at 17:34

## 2020-08-13 RX ADMIN — LORAZEPAM 2 MG: 1 TABLET ORAL at 02:07

## 2020-08-13 RX ADMIN — ENOXAPARIN SODIUM 40 MG: 40 INJECTION SUBCUTANEOUS at 06:00

## 2020-08-13 RX ADMIN — HALOPERIDOL LACTATE 5 MG: 5 INJECTION, SOLUTION INTRAMUSCULAR at 09:47

## 2020-08-13 RX ADMIN — LORAZEPAM 2 MG: 2 INJECTION INTRAMUSCULAR; INTRAVENOUS at 10:14

## 2020-08-13 RX ADMIN — AMOXICILLIN AND CLAVULANATE POTASSIUM 1 TABLET: 875; 125 TABLET, FILM COATED ORAL at 06:00

## 2020-08-13 RX ADMIN — Medication 400 MG: at 06:00

## 2020-08-13 RX ADMIN — AMLODIPINE BESYLATE 5 MG: 5 TABLET ORAL at 06:00

## 2020-08-13 RX ADMIN — OXYCODONE HYDROCHLORIDE 5 MG: 5 TABLET ORAL at 09:47

## 2020-08-13 RX ADMIN — LORAZEPAM 1 MG: 1 TABLET ORAL at 08:27

## 2020-08-13 RX ADMIN — AMOXICILLIN AND CLAVULANATE POTASSIUM 1 TABLET: 875; 125 TABLET, FILM COATED ORAL at 17:34

## 2020-08-13 RX ADMIN — PIPERONYL BUTOXIDE, PYRETHRUM EXTRACT: 4; .33 SHAMPOO TOPICAL at 17:33

## 2020-08-13 RX ADMIN — DOCUSATE SODIUM 50 MG AND SENNOSIDES 8.6 MG 2 TABLET: 8.6; 5 TABLET, FILM COATED ORAL at 17:34

## 2020-08-13 ASSESSMENT — LIFESTYLE VARIABLES
AUDITORY DISTURBANCES: NOT PRESENT
PAROXYSMAL SWEATS: NO SWEAT VISIBLE
HEADACHE, FULLNESS IN HEAD: NOT PRESENT
TOTAL SCORE: VERY MILD ITCHING, PINS AND NEEDLES SENSATION, BURNING OR NUMBNESS
TREMOR: NO TREMOR
NAUSEA AND VOMITING: NO NAUSEA AND NO VOMITING
TREMOR: NO TREMOR
ORIENTATION AND CLOUDING OF SENSORIUM: DISORIENTED FOR PLACE AND / OR PERSON
PAROXYSMAL SWEATS: NO SWEAT VISIBLE
AGITATION: MODERATELY FIDGETY AND RESTLESS
AGITATION: NORMAL ACTIVITY
ORIENTATION AND CLOUDING OF SENSORIUM: DISORIENTED FOR PLACE AND / OR PERSON
AGITATION: NORMAL ACTIVITY
AUDITORY DISTURBANCES: NOT PRESENT
ORIENTATION AND CLOUDING OF SENSORIUM: DISORIENTED FOR PLACE AND / OR PERSON
AUDITORY DISTURBANCES: NOT PRESENT
ANXIETY: NO ANXIETY (AT EASE)
VISUAL DISTURBANCES: NOT PRESENT
TOTAL SCORE: 4
NAUSEA AND VOMITING: NO NAUSEA AND NO VOMITING
VISUAL DISTURBANCES: NOT PRESENT
TOTAL SCORE: 5
TOTAL SCORE: 12
TOTAL SCORE: 9
HEADACHE, FULLNESS IN HEAD: NOT PRESENT
ANXIETY: NO ANXIETY (AT EASE)
TREMOR: NO TREMOR
AUDITORY DISTURBANCES: NOT PRESENT
HEADACHE, FULLNESS IN HEAD: NOT PRESENT
PAROXYSMAL SWEATS: NO SWEAT VISIBLE
NAUSEA AND VOMITING: NO NAUSEA AND NO VOMITING
ANXIETY: MODERATELY ANXIOUS OR GUARDED, SO ANXIETY IS INFERRED
NAUSEA AND VOMITING: NO NAUSEA AND NO VOMITING
VISUAL DISTURBANCES: NOT PRESENT
AUDITORY DISTURBANCES: NOT PRESENT
ORIENTATION AND CLOUDING OF SENSORIUM: DISORIENTED FOR PLACE AND / OR PERSON
ANXIETY: MODERATELY ANXIOUS OR GUARDED, SO ANXIETY IS INFERRED
HEADACHE, FULLNESS IN HEAD: NOT PRESENT
VISUAL DISTURBANCES: NOT PRESENT
TREMOR: NO TREMOR
AGITATION: NORMAL ACTIVITY
TREMOR: NO TREMOR
ORIENTATION AND CLOUDING OF SENSORIUM: DISORIENTED FOR PLACE AND / OR PERSON
TOTAL SCORE: 5
ANXIETY: MILDLY ANXIOUS
ANXIETY: MILDLY ANXIOUS
VISUAL DISTURBANCES: NOT PRESENT
HEADACHE, FULLNESS IN HEAD: NOT PRESENT
PAROXYSMAL SWEATS: NO SWEAT VISIBLE
ORIENTATION AND CLOUDING OF SENSORIUM: DISORIENTED FOR PLACE AND / OR PERSON
VISUAL DISTURBANCES: NOT PRESENT
HEADACHE, FULLNESS IN HEAD: NOT PRESENT
AGITATION: NORMAL ACTIVITY
NAUSEA AND VOMITING: NO NAUSEA AND NO VOMITING
AUDITORY DISTURBANCES: NOT PRESENT
HEADACHE, FULLNESS IN HEAD: NOT PRESENT
TOTAL SCORE: 4
AGITATION: NORMAL ACTIVITY
PAROXYSMAL SWEATS: NO SWEAT VISIBLE
PAROXYSMAL SWEATS: NO SWEAT VISIBLE
VISUAL DISTURBANCES: NOT PRESENT
ANXIETY: NO ANXIETY (AT EASE)
TOTAL SCORE: 4
AUDITORY DISTURBANCES: NOT PRESENT
TREMOR: NO TREMOR
TREMOR: NO TREMOR
PAROXYSMAL SWEATS: NO SWEAT VISIBLE
NAUSEA AND VOMITING: NO NAUSEA AND NO VOMITING
ORIENTATION AND CLOUDING OF SENSORIUM: DISORIENTED FOR PLACE AND / OR PERSON
NAUSEA AND VOMITING: NO NAUSEA AND NO VOMITING
NAUSEA AND VOMITING: NO NAUSEA AND NO VOMITING
AGITATION: NORMAL ACTIVITY

## 2020-08-13 NOTE — CARE PLAN
Problem: Safety  Goal: Will remain free from falls  Outcome: PROGRESSING AS EXPECTED  Note:  Treaded socks in place, Side rails up x2.  Bed in low, locked position.  Call light within reach, patient does not call.  CNA sitter in place.      Problem: Knowledge Deficit  Goal: Knowledge of disease process/condition, treatment plan, diagnostic tests, and medications will improve  Outcome: PROGRESSING SLOWER THAN EXPECTED  Note:  Patient  has poor insight and limited in his decision making capabilities.  Patient is not able to make decisions or  help with care decisions.

## 2020-08-13 NOTE — CARE PLAN
Problem: Safety  Goal: Will remain free from falls  Outcome: PROGRESSING AS EXPECTED  Intervention: Implement fall precautions  Flowsheets (Taken 8/12/2020 2000)  Environmental Precautions:   Treaded Slipper Socks on Patient   Personal Belongings, Wastebasket, Call Bell etc. in Easy Reach   Report Given to Other Health Care Providers Regarding Fall Risk   Transferred to Stronger Side   Bed in Low Position   Communication Sign for Patients & Families   Mobility Assessed & Appropriate Sign Placed     Problem: Psychosocial Needs:  Goal: Level of anxiety will decrease  Outcome: PROGRESSING AS EXPECTED  Interventions: Encourage patient to voice feelings, involve patient in the plan of care.

## 2020-08-13 NOTE — PROGRESS NOTES
Received report from Darshana day shift RN. Patient walking in room. Denies pain at this time. Vital signs stable, call light within reach.

## 2020-08-13 NOTE — DOCUMENTATION QUERY
[facility]                                                                       Query Response Note      PATIENT:               GRUPO GANN  ACCT #:                  [id number]  MRN:                     [id number]  :                      1955  ADMIT DATE:       2020 12:43 PM  DISCH DATE:          RESPONDING  PROVIDER #:        [id number]           QUERY TEXT:    Severe malnutrition is documented in the Registered Dietician eval.    (place details of dietician assessment under clinical indicators)      Please specify if you:    NOTE:  If an appropriate response is not listed below, please respond with a new note.      The patient's Clinical Indicators include:  8/10 RD eval: Malnutrition Risk: Severe malnutrition in the context of starvation-related as related to being homeless as evidenced by severe muscle and severe fat loss.   Treatment: RD consult, Food pantry prescription, Encourage and document intake, Monitor weight  Risk Factors: Alcohol dependence, Homeless, Sepsis, Psychiatric disorder, Encephalopathy  Options provided:   -- Agree with dietician  assessment of Severe malnutrition   -- Disagree with dietician assessment of Severe malnutrition   -- Unable to determine      Query created by: Macrina Coyne on 2020 11:09 AM    RESPONSE TEXT:    Agree with dietician assessment of Severe malnutrition          Electronically signed by:  SAVAGE DOCKERY MD 2020 1:52 PM

## 2020-08-13 NOTE — CARE PLAN
Problem: Nutritional:  Goal: Achieve adequate nutritional intake  Description: Patient will consume 50% of meals  Outcome: PROGRESSING AS EXPECTED    RN notes increased agitation and pt trying to leave room. Pt is eating  of all meals.    RD to follow.

## 2020-08-13 NOTE — CONSULTS
"PSYCHIATRIC INTAKE EVALUATION    *Reason for admission:   LLE. Seeping noted. Encrusted gauze and sock at site. Significant skin breakdown, redness and swelling noted....  surgery secondary to a fall multiple years ago then recently fell resulting in infection in the medial lateral aspect of his upper lower leg.  Is been seen here multiple times for the same condition....  The patient also states that he drinks alcohol daily .                *Reason for consult: persistent agitation        *Requesting Physician/APN: CARLTON Tim MD         Legal Hold status:    NA        *Chief Complaint:  none     *HPI (includes Psychiatric ROS):  63 yo male who looks much older who is totally confused. He doesn't remember getting angry or agitated, he thinks he is in a post office, in [place], can't remember his age but knows his . He thinks its September but got the year . He isn't sure why he is here. When asked about the bandage on his leg, he says he fell but looks at it and starts trying to peal it off. Stopped when told. He says \"I have to get some sort of solidarity job\".    Depression: \"because Im stuck in here\" but not SI. He has no place to live. However would like to get a job so he can get money to get a bus to go to the job. He made no comment when I told him he was safe here, had food, a bed, TV, etc. No reaction at all.    Anxiety: denies  Psychosis: denies    Note: : PMH significant for homelessness, etoh use, tobacco dependence and chronic LLE wound who presented 2020 with LLE wound infection.  He was hospitalized at our facility in April and evaluated by orthopedic surgery who recommended wound care.  Wound cultures at that time grew MSSA and strep.  Patient reported losing his Medicaid card and having no transportation to wound clinic.  He was seen at [facility] earlier this week and prescribed ABX, however he has not filled the prescription.  US LLE negative for DVT.  Treated for " "sepsis with empiric ABX and wound care.  ... refused BP meds though hypertensive when agitated (intermittent agitation)  ... O x 2    Records: 11/2018: \"history of alcohol abuse per records here and was on CIWA protocol and concerns for escalating agitation requiring ICU level care this morning. Patient is oriented only to person not to time. He is angry but not hallucinating, no tremor. He was able to get a walk around. Patient is demanding to go home and doesn't understand why he is in the hospital\". Dx alcohol withdrawal hallucinosis......    2 prior admissions 8/2018 and 10/2018 \"unsteady gait.  His leg was found to be malodorous.  His alcohol level was 0.39.\"   There are many more.  8/2018: speech cog: Patient is awake and alert and Ox4. He presents with symptoms of moderate-severe cognitive deficits. Specifically, immediate and short memory, safety awareness, insight, and problem solving are impaired. For example, patient has had chronic Mcleod catheter, but kept jumping out of bed saying \"I need to piss.\" He also stated \"I did a lot of cocaine. I can't remember sometimes.\" ...........  Its difficult to know if patient is at baseline or not.     *Medical Review Of Symptoms (not dx conditions):   ROS can't participate meaningfully     *Psychiatric Examination:   Vitals: Blood pressure 155/91, pulse 74, temperature 36.6 °C (97.9 °F), temperature source Temporal, resp. rate 20, height 1.803 m (5' 11\"), weight 67.1 kg (148 lb), SpO2 99 %.  General Appearance: older than stated age. Thin, intermittent eye contact, pleasant, tries to cooperate and thinks he is  Abnormal Movements:slowed  Gait and Posture: sitting on edge of bed  Speech:slowed  Thought Process: mildly slowed rate  Associations: generally linear but devoid of details.  Abnormal or Psychotic Thoughts: none noted  Judgement and Insight:impaired.   Orientation: to self and year  Recent and Remote Memory: can't focus enough to test  Attention Span and " "Concentration: slightly decreased  Language:not tested  Fund of Knowledge:not tested  Mood and Affect:looks euthymic  SI/HI: denies     *PAST MEDICAL/PSYCH/FAMILY/SOCIAL(as reported by patient):       *medical hx:           Past Medical History:   Diagnosis Date   • Psychiatric problem    • Restless leg    • Tobacco use      Past Surgical History:   Procedure Laterality Date   • ANKLE ORIF Right         *psychiatric hx:   11/13/2018: Dr Peña: septic, leg wound but non compliant. CIWA, sever3 alcohol abuse. Not capacitated to make medical decisions or leave AMA    *family Psych hx:  Doesn't know     *social hx: homeless. \"I was getting SS but I don't know what's going on\". No family when asked.   Alcohol: he drinks when he can find money. Per notes, his drinking has been severe.  Drugs:denies     *MEDICAL HX: labs, MARS, medications, etc were reviewed. Only those findings of potential interest to psychiatry are noted below:    *Current Medical issues:   see below     *Allergies:  No Known Allergies  *Current Medications:    Current Facility-Administered Medications:   •  pyrethrins-piperonyl butoxide (LICE KILLING) 0.33-4 % shampoo SHAM, , Topical, Once, Isaura Tim M.D.  •  cloNIDine (CATAPRES) tablet 0.1 mg, 0.1 mg, Oral, Q6HRS PRN, Naye Florence A.P.R.N.  •  amoxicillin-clavulanate (AUGMENTIN) 875-125 MG per tablet 1 Tab, 1 Tab, Oral, Q12HRS, Isaura Tim M.D., 1 Tab at 08/13/20 0600  •  magnesium oxide (MAG-OX) tablet 400 mg, 400 mg, Oral, BID, Naye Florence, A.P.R.N., 400 mg at 08/13/20 0600  •  nicotine (NICODERM) 21 MG/24HR 21 mg, 21 mg, Transdermal, Daily-0600, Stopped at 08/11/20 0600 **AND** Nicotine Replacement Patient Education Materials, , , Once **AND** nicotine polacrilex (NICORETTE) 2 MG piece 2 mg, 2 mg, Oral, Q HOUR PRN, Naye Florence A.P.R.N., 2 mg at 08/11/20 1517  •  amLODIPine (NORVASC) tablet 5 mg, 5 mg, Oral, Q DAY, London Alvarado M.D., 5 mg at 08/13/20 0600  •  " labetalol (NORMODYNE/TRANDATE) injection 10 mg, 10 mg, Intravenous, Q6HRS PRN, London Alvarado M.D., 10 mg at 08/11/20 2342  •  haloperidol lactate (HALDOL) injection 2-5 mg, 2-5 mg, Intravenous, Q4HRS PRN, Naye Florence, A.P.R.N., 5 mg at 08/13/20 0947  •  LORazepam (ATIVAN) tablet 0.5 mg, 0.5 mg, Oral, Q4HRS PRN, Naye Florence, A.P.R.N.  •  LORazepam (ATIVAN) tablet 1 mg, 1 mg, Oral, Q4HRS PRN, 1 mg at 08/13/20 0827 **OR** LORazepam (ATIVAN) injection 0.5 mg, 0.5 mg, Intravenous, Q4HRS PRN, Naye Florence, A.P.R.N., 0.5 mg at 08/11/20 0107  •  LORazepam (ATIVAN) tablet 2 mg, 2 mg, Oral, Q2HRS PRN, 2 mg at 08/13/20 0207 **OR** LORazepam (ATIVAN) injection 1 mg, 1 mg, Intravenous, Q2HRS PRN, Naye Florence, A.P.R.N., 1 mg at 08/12/20 1026  •  LORazepam (ATIVAN) tablet 3 mg, 3 mg, Oral, Q HOUR PRN, 3 mg at 08/11/20 2114 **OR** LORazepam (ATIVAN) injection 1.5 mg, 1.5 mg, Intravenous, Q HOUR PRN, Naye Florence, A.P.R.N., 1.5 mg at 08/12/20 0322  •  LORazepam (ATIVAN) tablet 4 mg, 4 mg, Oral, Q15 MIN PRN **OR** LORazepam (ATIVAN) injection 2 mg, 2 mg, Intravenous, Q15 MIN PRN, Naye Florence, A.P.R.N., 2 mg at 08/13/20 1014  •  senna-docusate (PERICOLACE or SENOKOT S) 8.6-50 MG per tablet 2 Tab, 2 Tab, Oral, BID, 2 Tab at 08/12/20 1753 **AND** polyethylene glycol/lytes (MIRALAX) PACKET 1 Packet, 1 Packet, Oral, QDAY PRN **AND** magnesium hydroxide (MILK OF MAGNESIA) suspension 30 mL, 30 mL, Oral, QDAY PRN **AND** bisacodyl (DULCOLAX) suppository 10 mg, 10 mg, Rectal, QDAY PRN, London Alvarado M.D.  •  lactated ringers infusion (BOLUS), 500 mL, Intravenous, Once PRN, London Alvarado M.D.  •  enoxaparin (LOVENOX) inj 40 mg, 40 mg, Subcutaneous, DAILY, London Alvarado M.D., 40 mg at 08/13/20 0600  •  acetaminophen (TYLENOL) tablet 650 mg, 650 mg, Oral, Q6HRS PRN, London Alvarado M.D., 650 mg at 08/11/20 2113  •  Notify provider if pain remains uncontrolled, , , CONTINUOUS **AND** Use the  numeric rating scale (NRS-11) on regular floors and Critical-Care Pain Observation Tool (CPOT) on ICUs/Trauma to assess pain, , , CONTINUOUS **AND** Pulse Ox (Oximetry), , , CONTINUOUS **AND** Pharmacy Consult Request ...Pain Management Review 1 Each, 1 Each, Other, PHARMACY TO DOSE **AND** If patient difficult to arouse and/or has respiratory depression, stop any opiates that are currently infusing and call a Rapid Response., , , CONTINUOUS **AND** oxyCODONE immediate-release (ROXICODONE) tablet 2.5 mg, 2.5 mg, Oral, Q3HRS PRN **AND** oxyCODONE immediate-release (ROXICODONE) tablet 5 mg, 5 mg, Oral, Q3HRS PRN, 5 mg at 08/13/20 0947 **AND** [DISCONTINUED] HYDROmorphone pf (DILAUDID) injection 0.25 mg, 0.25 mg, Intravenous, Q3HRS PRN, London Alvarado M.D., 0.25 mg at 08/07/20 2211  •  ondansetron (ZOFRAN) syringe/vial injection 4 mg, 4 mg, Intravenous, Q4HRS PRN, London Alvarado M.D.  •  ondansetron (ZOFRAN ODT) dispertab 4 mg, 4 mg, Oral, Q4HRS PRN, London Alvarado M.D.  *ECG: personally reviewed QTc 471  *Imaging: personally reviewed CT 2020: no significant findings.        *Labs:  No results for input(s): WBC, RBC, HEMOGLOBIN, HEMATOCRIT, MCV, MCH, RDW, PLATELETCT, MPV, NEUTSPOLYS, LYMPHOCYTES, MONOCYTES, EOSINOPHILS, BASOPHILS, RBCMORPHOLO in the last 72 hours.  Lab Results   Component Value Date/Time    SODIUM 140 08/11/2020 07:18 AM    POTASSIUM 3.9 08/11/2020 07:18 AM    CHLORIDE 101 08/11/2020 07:18 AM    CO2 27 08/11/2020 07:18 AM    GLUCOSE 97 08/11/2020 07:18 AM    BUN 9 08/11/2020 07:18 AM    CREATININE 0.60 08/11/2020 07:18 AM         Lab Results   Component Value Date/Time    BREATHALIZER 0.21 (A) 01/18/2020 2154     No components found for: BLOODALCOHOL   Lab Results   Component Value Date/Time    AMPHUR Negative 08/07/2020 1552    BARBSURINE Negative 08/07/2020 1552    BENZODIAZU Negative 08/07/2020 1552    COCAINEMET Negative 08/07/2020 1552    METHADONE Negative 08/07/2020 1552     OPIATES Negative 08/07/2020 1552    OXYCODN Negative 08/07/2020 1552    PCPURINE Negative 08/07/2020 1552    PROPOXY Negative 08/07/2020 1552    CANNABINOID Negative 08/07/2020 1552   Results for GRUPO GANN (MRN [id number]) as of 8/13/2020 14:07   Ref. Range 8/12/2020 13:55   Stat C-Reactive Protein Latest Ref Range: 0.00 - 0.75 mg/dL 1.07 (H)            *ASSESSMENT/PLAN    1. Neurocognitive disorder unspc  -possibly Wernicke Korsakoff's which can improve after a few months of thiamine and nutrition and that he has had repeated admits with very similar presentations of poor self care, alcoholism, confusion and agitation.  -if unsure if he is encephalopathic or not, consider an EEG.    2.  Alcohol use disorder    -R/O prolong withdrawals: can last up to 14 days in some and he has risk factors: poor nutrition, perhaps preceeding neurocognitive deficits, infection.          3. Medical:  -open wound of LLE. Poor compliance with outpt care  -prior wound culture MSSA  -flexion contracture of L knee secondary to chronic wound  -lice  -encephalopathy  -sepsis on admit: resolved  -protein malnurtrition      Legal hold: guardianship being pursued. Will order risperdol 0.5 mg   Routinely bid to start. However antipsychotics don't do as predictably well in delirium as in primary psychotic disorders. Assuming he is not cirrhotic could try depakote 125 mg tid. If that doesn't work could consider neurontin 100 mg tid although that can be sedating enough that he might become a fall risk.    *Signing off but please reconsult as needed.

## 2020-08-13 NOTE — PROGRESS NOTES
Patient increasingly agitated and trying to leave room. Dr Tim ordered  2 mg Ativan IV for increased agitation.

## 2020-08-13 NOTE — PROGRESS NOTES
"Hospital Medicine Daily Progress Note    Date of Service  8/13/2020    Chief Complaint  LLE wound    Hospital Course   Mr. Varela is a 64-year-old male with PMH significant for homelessness, etoh use, tobacco dependence and chronic LLE wound who presented 8/7/2020 with LLE wound infection.  He was hospitalized at our facility in April and evaluated by orthopedic surgery who recommended wound care.  Wound cultures at that time grew MSSA and strep.  Patient reported losing his Medicaid card and having no transportation to wound clinic.  He was seen at [facility] earlier this week and prescribed ABX, however he has not filled the prescription.  US LLE negative for DVT.  Treated for sepsis with empiric ABX and wound care.  He was found to have head lice and underwent treatments.        Interval Problem Update  - continues to have intermittent agitation requiring haldol and ativan, wants to walk around or leave room, gets frustrated when he can't, starts hitting himself in the head, consulting psych today  - CNA saw active lice on the bed still, will treat again   - Afebrile  - Wound care managing leg wounds    Consultants/Specialty  Psychiatry    Code Status  Full Code    Disposition  Will need guardianship and placement    Review of Systems  Review of Systems   Unable to perform ROS: Psychiatric disorder        Physical Exam  Temp:  [36.2 °C (97.2 °F)-37.2 °C (98.9 °F)] 37.2 °C (98.9 °F)  Pulse:  [79-89] 89  Resp:  [18] 18  BP: (149-167)/() 149/90  SpO2:  [97 %-99 %] 99 %    Physical Exam  Vitals signs and nursing note reviewed. Exam conducted with a chaperone present.   Constitutional:       General: He is awake. He is not in acute distress.     Appearance: He is underweight. He is ill-appearing.      Comments: Disheveled. Unkempt  Originally very agitated, wanting to get out of room and saying \"I'm not your hostage\", was given ativan then sleeping calmly on the bed     HENT:      Nose: Nose normal.    "   Mouth/Throat:      Lips: Pink.      Mouth: Mucous membranes are moist.   Eyes:      General: No scleral icterus.     Conjunctiva/sclera: Conjunctivae normal.   Neck:      Musculoskeletal: Normal range of motion.   Cardiovascular:      Rate and Rhythm: Normal rate and regular rhythm.      Heart sounds: Normal heart sounds.   Pulmonary:      Effort: Pulmonary effort is normal.      Breath sounds: Normal breath sounds.   Abdominal:      General: Bowel sounds are normal.      Palpations: Abdomen is soft.      Tenderness: There is no abdominal tenderness. There is no guarding or rebound.   Musculoskeletal:      Right lower leg: No edema.      Left lower leg: No edema.   Skin:     General: Skin is warm and dry.      Comments: Left leg wounds without surrounding erythema or drainage.  Mepilex covering wounds    Neurological:      Mental Status: He is alert.      Gait: Gait normal.   Psychiatric:         Mood and Affect: Mood normal. Affect is labile and angry.         Behavior: Behavior is agitated. Behavior is not aggressive or combative.      Comments:            Fluids    Intake/Output Summary (Last 24 hours) at 8/13/2020 1354  Last data filed at 8/13/2020 1230  Gross per 24 hour   Intake 730 ml   Output --   Net 730 ml       Laboratory      Recent Labs     08/11/20  0718   SODIUM 140   POTASSIUM 3.9   CHLORIDE 101   CO2 27   GLUCOSE 97   BUN 9   CREATININE 0.60   CALCIUM 8.8                   Imaging  CT-HEAD W/O   Final Result      No acute intracranial abnormality      DX-TIBIA AND FIBULA LEFT   Final Result      1.  Healed distal metaphyseal fractures of the left fibula and tibia with tibial IM layla in place.      2.  No acute fracture or dislocation.      3.  No radiopaque soft tissue foreign body.      DX-FEMUR-2+ LEFT   Final Result      1.  No radiographic evidence of acute traumatic injury left femur.      2.  Unchanged cortical thickening in the posterior aspect of the distal femoral diaphysis which may  represent sequela of prior injury or infection.      3.  No soft tissue foreign body identified.      US-EXTREMITY VENOUS LOWER UNILAT LEFT   Final Result      DX-CHEST-PORTABLE (1 VIEW)   Final Result      No acute cardiopulmonary abnormality.           Assessment/Plan  Open wound of left lower leg- (present on admission)  Assessment & Plan  -chronic  -poor compliance with OP wound care. He can't remember the last time he had his dressing changed OP  -wound care  -wound culture pending - Previous cultures grew MSSA and group A strep.  Preliminary wound cultures from this hospitalization also growing MSSA and group A strep  -blood cultures (-) to date  -afebrile. Leukocytosis resolved  -continue ABX unasyn 8/7-8/11 -->augmentin 8/11, likely 10-14 day course  -LLE US (-) DVT    Hypomagnesemia  Assessment & Plan  -Likely related to EtOH abuse history  -Daily supplementation  -Follow labs as clinically indicated    Hypokalemia- (present on admission)  Assessment & Plan  -Also with hypomagnesemia  -Resolved after replacement    Flexion contracture of knee, 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)  Assessment & Plan  -secondary to chronic wound in that area  -PT OT  -encourage mobility      Head lice  Assessment & Plan  Head lice found  S/p first round of treatment 8/10, will need second treatment 8/17    Encephalopathy- (present on admission)  Assessment & Plan  -acute on chronic  -likely due to significant EtOH history.  Possible component of Warnicke's encephalopathy  -Psychiatry evaluated him and deemed him incapacitated to make medical decision or leave AMA and recommend SLP cognitive eval.  Patient will likely require guardianship and placement  -CT head unremarkable  -no acute neurological deficits  -Avoid narcotics and hypnotics.  Cautious use of benzodiazepines for alcohol withdrawal protocol  -Frequent reorientation  -Sleep hygiene      Non-compliance  Assessment & Plan  -likely also component of psychiatric disorder and  "ETOH abuse  -Psychiatric consult today suggests patient is incapacitated to make medical decisions or leave AMA  -ongoing encouragement for compliance.    Psychiatric disorder  Assessment & Plan  -unknown/unspecified  -not currently on medication  -history of severe alcohol dependence.  -psychiatry consult today for agitation - incapacitated to leave AMA or make medical decisions      Alcohol dependence (HCC)- (present on admission)  Assessment & Plan  -history of.  Ongoing.  He reports drinking \"once in a while\"  -withdrawal previous hospitalizations  -with anxiety  -CIWA 6-21  -seizure precautions  -MVI and thiamine    Protein malnutrition (HCC)  Assessment & Plan  -history of ETOH and homelessness  -dietary consult  -TID supplements  -encourage PO intake    Sepsis (HCC)- (present on admission)  Assessment & Plan  -This is Sepsis Present on admission  SIRS criteria identified on my evaluation include: Leukocytosis, with WBC greater than 12,000   Source is cellulitis and wound infection  Sepsis protocol initiated  Fluid resuscitation ordered per protocol  IV antibiotics as appropriate for source of sepsis  While organ dysfunction may be noted elsewhere in this problem list or in the chart, degree of organ dysfunction does not meet CMS criteria for severe sepsis  -afebrile  -lactic WNL  -blood cultures negative to date  -Sepsis has resolved        Tobacco dependence- (present on admission)  Assessment & Plan  -Nicotine replacement protocol         VTE prophylaxis: Enoxaparin      I have performed a physical exam and reviewed and updated ROS and Assessment/Plan today (08/10/20). In review of the previous note there are no changes except as documented above    Please note that this dictation was created using voice recognition software. I have made every reasonable attempt to correct obvious errors, but there may be errors of grammar and possibly content that I did not discover before finalizing the note.    Isaura DUPREE" AKOSUA Tim.

## 2020-08-14 PROCEDURE — A9270 NON-COVERED ITEM OR SERVICE: HCPCS | Performed by: NURSE PRACTITIONER

## 2020-08-14 PROCEDURE — A9270 NON-COVERED ITEM OR SERVICE: HCPCS | Performed by: INTERNAL MEDICINE

## 2020-08-14 PROCEDURE — A9270 NON-COVERED ITEM OR SERVICE: HCPCS | Performed by: HOSPITALIST

## 2020-08-14 PROCEDURE — 700102 HCHG RX REV CODE 250 W/ 637 OVERRIDE(OP): Performed by: INTERNAL MEDICINE

## 2020-08-14 PROCEDURE — 700111 HCHG RX REV CODE 636 W/ 250 OVERRIDE (IP): Performed by: HOSPITALIST

## 2020-08-14 PROCEDURE — 700102 HCHG RX REV CODE 250 W/ 637 OVERRIDE(OP): Performed by: HOSPITALIST

## 2020-08-14 PROCEDURE — 700102 HCHG RX REV CODE 250 W/ 637 OVERRIDE(OP): Performed by: NURSE PRACTITIONER

## 2020-08-14 PROCEDURE — 99232 SBSQ HOSP IP/OBS MODERATE 35: CPT | Performed by: INTERNAL MEDICINE

## 2020-08-14 PROCEDURE — 770021 HCHG ROOM/CARE - ISO PRIVATE

## 2020-08-14 PROCEDURE — 700111 HCHG RX REV CODE 636 W/ 250 OVERRIDE (IP): Performed by: NURSE PRACTITIONER

## 2020-08-14 RX ORDER — RISPERIDONE 0.5 MG/1
0.5 TABLET ORAL 2 TIMES DAILY
Status: DISCONTINUED | OUTPATIENT
Start: 2020-08-14 | End: 2020-09-09

## 2020-08-14 RX ADMIN — RISPERIDONE 0.5 MG: 0.5 TABLET ORAL at 16:44

## 2020-08-14 RX ADMIN — AMOXICILLIN AND CLAVULANATE POTASSIUM 1 TABLET: 875; 125 TABLET, FILM COATED ORAL at 16:44

## 2020-08-14 RX ADMIN — RISPERIDONE 0.5 MG: 0.5 TABLET ORAL at 08:16

## 2020-08-14 RX ADMIN — OXYCODONE HYDROCHLORIDE 5 MG: 5 TABLET ORAL at 08:16

## 2020-08-14 RX ADMIN — HALOPERIDOL LACTATE 5 MG: 5 INJECTION, SOLUTION INTRAMUSCULAR at 20:56

## 2020-08-14 RX ADMIN — Medication 400 MG: at 05:26

## 2020-08-14 RX ADMIN — Medication 400 MG: at 18:00

## 2020-08-14 RX ADMIN — HALOPERIDOL LACTATE 5 MG: 5 INJECTION, SOLUTION INTRAMUSCULAR at 11:17

## 2020-08-14 RX ADMIN — OXYCODONE HYDROCHLORIDE 5 MG: 5 TABLET ORAL at 16:44

## 2020-08-14 RX ADMIN — ENOXAPARIN SODIUM 40 MG: 40 INJECTION SUBCUTANEOUS at 05:26

## 2020-08-14 RX ADMIN — AMOXICILLIN AND CLAVULANATE POTASSIUM 1 TABLET: 875; 125 TABLET, FILM COATED ORAL at 05:26

## 2020-08-14 RX ADMIN — AMLODIPINE BESYLATE 5 MG: 5 TABLET ORAL at 05:26

## 2020-08-14 RX ADMIN — OXYCODONE HYDROCHLORIDE 5 MG: 5 TABLET ORAL at 21:04

## 2020-08-14 RX ADMIN — OXYCODONE HYDROCHLORIDE 5 MG: 5 TABLET ORAL at 02:08

## 2020-08-14 RX ADMIN — LORAZEPAM 1.5 MG: 2 INJECTION INTRAMUSCULAR; INTRAVENOUS at 11:18

## 2020-08-14 RX ADMIN — HALOPERIDOL LACTATE 5 MG: 5 INJECTION, SOLUTION INTRAMUSCULAR at 16:43

## 2020-08-14 RX ADMIN — DOCUSATE SODIUM 50 MG AND SENNOSIDES 8.6 MG 2 TABLET: 8.6; 5 TABLET, FILM COATED ORAL at 05:26

## 2020-08-14 ASSESSMENT — LIFESTYLE VARIABLES
TOTAL SCORE: 2
HEADACHE, FULLNESS IN HEAD: NOT PRESENT
NAUSEA AND VOMITING: NO NAUSEA AND NO VOMITING
AUDITORY DISTURBANCES: NOT PRESENT
PAROXYSMAL SWEATS: NO SWEAT VISIBLE
NAUSEA AND VOMITING: NO NAUSEA AND NO VOMITING
ANXIETY: NO ANXIETY (AT EASE)
VISUAL DISTURBANCES: NOT PRESENT
TREMOR: NO TREMOR
AGITATION: NORMAL ACTIVITY
ANXIETY: MILDLY ANXIOUS
ORIENTATION AND CLOUDING OF SENSORIUM: CANNOT DO SERIAL ADDITIONS OR IS UNCERTAIN ABOUT DATE
TOTAL SCORE: 3
AUDITORY DISTURBANCES: VERY MILD HARSHNESS OR ABILITY TO FRIGHTEN
HEADACHE, FULLNESS IN HEAD: NOT PRESENT
ANXIETY: NO ANXIETY (AT EASE)
TREMOR: NO TREMOR
NAUSEA AND VOMITING: NO NAUSEA AND NO VOMITING
AUDITORY DISTURBANCES: NOT PRESENT
NAUSEA AND VOMITING: NO NAUSEA AND NO VOMITING
HEADACHE, FULLNESS IN HEAD: NOT PRESENT
ANXIETY: NO ANXIETY (AT EASE)
TOTAL SCORE: 19
HEADACHE, FULLNESS IN HEAD: NOT PRESENT
AGITATION: NORMAL ACTIVITY
VISUAL DISTURBANCES: NOT PRESENT
AUDITORY DISTURBANCES: NOT PRESENT
ANXIETY: NO ANXIETY (AT EASE)
AGITATION: PACES BACK AND FORTH DURING MOST OF THE INTERVIEW OR CONSTANTLY THRASHES ABOUT.
VISUAL DISTURBANCES: NOT PRESENT
ORIENTATION AND CLOUDING OF SENSORIUM: DATE DISORIENTATION BY MORE THAN TWO CALENDAR DAYS
PAROXYSMAL SWEATS: NO SWEAT VISIBLE
PAROXYSMAL SWEATS: NO SWEAT VISIBLE
VISUAL DISTURBANCES: NOT PRESENT
AUDITORY DISTURBANCES: NOT PRESENT
AGITATION: NORMAL ACTIVITY
AUDITORY DISTURBANCES: NOT PRESENT
AGITATION: NORMAL ACTIVITY
AUDITORY DISTURBANCES: NOT PRESENT
ORIENTATION AND CLOUDING OF SENSORIUM: DATE DISORIENTATION BY MORE THAN TWO CALENDAR DAYS
TREMOR: NO TREMOR
NAUSEA AND VOMITING: NO NAUSEA AND NO VOMITING
ORIENTATION AND CLOUDING OF SENSORIUM: DISORIENTED FOR PLACE AND / OR PERSON
VISUAL DISTURBANCES: NOT PRESENT
AGITATION: NORMAL ACTIVITY
TOTAL SCORE: 4
VISUAL DISTURBANCES: NOT PRESENT
NAUSEA AND VOMITING: NO NAUSEA AND NO VOMITING
ANXIETY: NO ANXIETY (AT EASE)
TREMOR: NO TREMOR
HEADACHE, FULLNESS IN HEAD: NOT PRESENT
ORIENTATION AND CLOUDING OF SENSORIUM: DISORIENTED FOR PLACE AND / OR PERSON
TOTAL SCORE: 3
PAROXYSMAL SWEATS: NO SWEAT VISIBLE
ORIENTATION AND CLOUDING OF SENSORIUM: DATE DISORIENTATION BY MORE THAN TWO CALENDAR DAYS
VISUAL DISTURBANCES: NOT PRESENT
TREMOR: NO TREMOR
PAROXYSMAL SWEATS: NO SWEAT VISIBLE
PAROXYSMAL SWEATS: NO SWEAT VISIBLE
NAUSEA AND VOMITING: NO NAUSEA AND NO VOMITING
HEADACHE, FULLNESS IN HEAD: NOT PRESENT
ORIENTATION AND CLOUDING OF SENSORIUM: DISORIENTED FOR PLACE AND / OR PERSON
TOTAL SCORE: 3
TREMOR: NO TREMOR
TOTAL SCORE: 4
AGITATION: NORMAL ACTIVITY
TREMOR: NO TREMOR
PAROXYSMAL SWEATS: NO SWEAT VISIBLE
HEADACHE, FULLNESS IN HEAD: NOT PRESENT
ANXIETY: EQUIVALENT TO ACUTE PANIC STATES AS OCCUR IN SEVERE DELIRIUM OR ACUTE SCHIZOPHRENIC REACTIONS

## 2020-08-14 NOTE — CARE PLAN
Problem: Bowel/Gastric:  Goal: Normal bowel function is maintained or improved  Outcome: PROGRESSING AS EXPECTED  Flowsheets (Taken 8/14/2020 0200 by Fariba Horn, C.N.A.)  Last BM: 08/14/20  Note: Pt had a bowel movement 08/14. Pt reports bowel motility within baseline. Bowel sounds normoactive in all four quadrants.       Problem: Fluid Volume:  Goal: Will maintain balanced intake and output  Outcome: PROGRESSING AS EXPECTED  Note: Patient is voiding appropriately for oral intake.

## 2020-08-14 NOTE — PROGRESS NOTES
Hospital Medicine Daily Progress Note    Date of Service  8/14/2020    Chief Complaint  LLE wound    Hospital Course   Mr. Varela is a 64-year-old male with PMH significant for homelessness, etoh use, tobacco dependence and chronic LLE wound who presented 8/7/2020 with LLE wound infection.  He was hospitalized at our facility in April and evaluated by orthopedic surgery who recommended wound care.  Wound cultures at that time grew MSSA and strep.  Patient reported losing his Medicaid card and having no transportation to wound clinic.  He was seen at [facility] earlier this week and prescribed ABX, however he has not filled the prescription.  US LLE negative for DVT.  Treated for sepsis with empiric ABX and wound care.  He was found to have head lice and underwent treatments.        Interval Problem Update  - No acute events overnight  - Did much better this morning after getting risperdal, did have episode before lunch where he was agitated (scored 19 on CIWA, otherwise CIWA scores 2-3)  - Afebrile      Consultants/Specialty  Psychiatry    Code Status  Full Code    Disposition  Will need guardianship and placement    Review of Systems  Review of Systems   Unable to perform ROS: Psychiatric disorder        Physical Exam  Temp:  [36.6 °C (97.8 °F)-36.8 °C (98.3 °F)] 36.6 °C (97.8 °F)  Pulse:  [74-83] 78  Resp:  [18-20] 20  BP: (152-155)/(82-91) 152/82  SpO2:  [96 %-99 %] 97 %    Physical Exam  Vitals signs and nursing note reviewed. Exam conducted with a chaperone present.   Constitutional:       General: He is awake. He is not in acute distress.     Appearance: He is underweight. He is ill-appearing.      Comments: Disheveled. Unkempt  More calm today during my assessment   HENT:      Nose: Nose normal.      Mouth/Throat:      Lips: Pink.      Mouth: Mucous membranes are moist.   Eyes:      General: No scleral icterus.     Conjunctiva/sclera: Conjunctivae normal.   Neck:      Musculoskeletal: Normal range  of motion.   Cardiovascular:      Rate and Rhythm: Normal rate and regular rhythm.      Heart sounds: Normal heart sounds.   Pulmonary:      Effort: Pulmonary effort is normal.      Breath sounds: Normal breath sounds.   Abdominal:      General: Bowel sounds are normal.      Palpations: Abdomen is soft.      Tenderness: There is no abdominal tenderness. There is no guarding or rebound.   Musculoskeletal:      Right lower leg: No edema.      Left lower leg: No edema.   Skin:     General: Skin is warm and dry.      Comments: Left leg wounds without surrounding erythema or drainage.  Mepilex covering wounds    Neurological:      Mental Status: He is alert.      Gait: Gait normal.   Psychiatric:         Mood and Affect: Mood and affect normal.         Behavior: Behavior is not aggressive or combative. Behavior is cooperative.      Comments:            Fluids    Intake/Output Summary (Last 24 hours) at 8/14/2020 1537  Last data filed at 8/14/2020 0900  Gross per 24 hour   Intake 720 ml   Output --   Net 720 ml       Laboratory                        Imaging  CT-HEAD W/O   Final Result      No acute intracranial abnormality      DX-TIBIA AND FIBULA LEFT   Final Result      1.  Healed distal metaphyseal fractures of the left fibula and tibia with tibial IM layla in place.      2.  No acute fracture or dislocation.      3.  No radiopaque soft tissue foreign body.      DX-FEMUR-2+ LEFT   Final Result      1.  No radiographic evidence of acute traumatic injury left femur.      2.  Unchanged cortical thickening in the posterior aspect of the distal femoral diaphysis which may represent sequela of prior injury or infection.      3.  No soft tissue foreign body identified.      US-EXTREMITY VENOUS LOWER UNILAT LEFT   Final Result      DX-CHEST-PORTABLE (1 VIEW)   Final Result      No acute cardiopulmonary abnormality.           Assessment/Plan  Open wound of left lower leg- (present on admission)  Assessment &  Plan  -chronic  -poor compliance with OP wound care. He can't remember the last time he had his dressing changed OP  -wound care  -wound culture pending - Previous cultures grew MSSA and group A strep.  Preliminary wound cultures from this hospitalization also growing MSSA and group A strep  -blood cultures (-) to date  -afebrile. Leukocytosis resolved  -continue ABX unasyn 8/7-8/11 -->augmentin 8/11, likely 10-14 day course  -LLE US (-) DVT    Hypomagnesemia  Assessment & Plan  -Likely related to EtOH abuse history  -Daily supplementation  -Follow labs as clinically indicated    Hypokalemia- (present on admission)  Assessment & Plan  -Also with hypomagnesemia  -Resolved after replacement    Flexion contracture of knee, 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)  Assessment & Plan  -secondary to chronic wound in that area  -PT OT  -encourage mobility      Head lice  Assessment & Plan  Head lice found  S/p first round of treatment 8/10, will treat again today given active lice seen by CNA, will need second treatment 8/17    Encephalopathy- (present on admission)  Assessment & Plan  -acute on chronic  -likely due to significant EtOH history.  Possible component of Warnicke's encephalopathy  -Psychiatry evaluated him and deemed him incapacitated to make medical decision or leave AMA and recommend SLP cognitive eval.  Patient will likely require guardianship and placement  -CT head unremarkable  -no acute neurological deficits  -Avoid narcotics and hypnotics.  Cautious use of benzodiazepines for alcohol withdrawal protocol  -Frequent reorientation  -Sleep hygiene      Non-compliance  Assessment & Plan  -likely also component of psychiatric disorder and ETOH abuse  -Psychiatric consult today suggests patient is incapacitated to make medical decisions or leave AMA  -ongoing encouragement for compliance.    Psychiatric disorder  Assessment & Plan  -unknown/unspecified  -not currently on medication  -history of severe alcohol  "dependence.  -psychiatry consult today for agitation - incapacitated to leave AMA or make medical decisions, appreciate recs: risperdol 0.5mg BID, can add depakote 125mg TID (if LFTs okay, does have significant drinking history but no dx of cirrhosis), if needed can add neurotin 100mg TID      Alcohol dependence (HCC)- (present on admission)  Assessment & Plan  -history of.  Ongoing.  He reports drinking \"once in a while\"  -withdrawal previous hospitalizations  -with anxiety  -CIWA 4-13  -seizure precautions  -MVI and thiamine    Protein malnutrition (HCC)  Assessment & Plan  -history of ETOH and homelessness  -dietary consult  -TID supplements  -encourage PO intake    Sepsis (HCC)- (present on admission)  Assessment & Plan  -This is Sepsis Present on admission  SIRS criteria identified on my evaluation include: Leukocytosis, with WBC greater than 12,000   Source is cellulitis and wound infection  Sepsis protocol initiated  Fluid resuscitation ordered per protocol  IV antibiotics as appropriate for source of sepsis  While organ dysfunction may be noted elsewhere in this problem list or in the chart, degree of organ dysfunction does not meet CMS criteria for severe sepsis  -afebrile  -lactic WNL  -blood cultures negative to date  -Sepsis has resolved        Tobacco dependence- (present on admission)  Assessment & Plan  -Nicotine replacement protocol         VTE prophylaxis: Enoxaparin            "

## 2020-08-14 NOTE — PROGRESS NOTES
Report received. Assumed care. Pt in bed laying down. A/O x1. VSS. Responds appropriately. Denies chest pain and SOB. Assessment complete. Discussed POC, , pt verbalizes understanding. Explained importance of calling before getting OOB. Call light and belongings within reach. Bed alarm on. Bed in the lowest position. Treaded socks in place. Hourly rounding in progress. Safety sitter bedside.

## 2020-08-14 NOTE — PROGRESS NOTES
Patient shower completed with lice killing shampoo and complete linen change completed and linens and towel double bagged as per protocol.  Dressing on leg was coming off mepilex replaced.

## 2020-08-15 LAB
ALBUMIN SERPL BCP-MCNC: 4 G/DL (ref 3.2–4.9)
ALBUMIN/GLOB SERPL: 0.9 G/DL
ALP SERPL-CCNC: 90 U/L (ref 30–99)
ALT SERPL-CCNC: 15 U/L (ref 2–50)
ANION GAP SERPL CALC-SCNC: 15 MMOL/L (ref 7–16)
AST SERPL-CCNC: 15 U/L (ref 12–45)
BILIRUB SERPL-MCNC: 0.2 MG/DL (ref 0.1–1.5)
BUN SERPL-MCNC: 17 MG/DL (ref 8–22)
CALCIUM SERPL-MCNC: 9.8 MG/DL (ref 8.5–10.5)
CHLORIDE SERPL-SCNC: 97 MMOL/L (ref 96–112)
CO2 SERPL-SCNC: 23 MMOL/L (ref 20–33)
CREAT SERPL-MCNC: 0.94 MG/DL (ref 0.5–1.4)
GLOBULIN SER CALC-MCNC: 4.3 G/DL (ref 1.9–3.5)
GLUCOSE SERPL-MCNC: 171 MG/DL (ref 65–99)
POTASSIUM SERPL-SCNC: 3.9 MMOL/L (ref 3.6–5.5)
PROT SERPL-MCNC: 8.3 G/DL (ref 6–8.2)
SODIUM SERPL-SCNC: 135 MMOL/L (ref 135–145)

## 2020-08-15 PROCEDURE — A9270 NON-COVERED ITEM OR SERVICE: HCPCS | Performed by: INTERNAL MEDICINE

## 2020-08-15 PROCEDURE — 36415 COLL VENOUS BLD VENIPUNCTURE: CPT

## 2020-08-15 PROCEDURE — 700102 HCHG RX REV CODE 250 W/ 637 OVERRIDE(OP): Performed by: HOSPITALIST

## 2020-08-15 PROCEDURE — A9270 NON-COVERED ITEM OR SERVICE: HCPCS | Performed by: NURSE PRACTITIONER

## 2020-08-15 PROCEDURE — 80053 COMPREHEN METABOLIC PANEL: CPT

## 2020-08-15 PROCEDURE — A9270 NON-COVERED ITEM OR SERVICE: HCPCS | Performed by: HOSPITALIST

## 2020-08-15 PROCEDURE — 700102 HCHG RX REV CODE 250 W/ 637 OVERRIDE(OP): Performed by: INTERNAL MEDICINE

## 2020-08-15 PROCEDURE — 700111 HCHG RX REV CODE 636 W/ 250 OVERRIDE (IP): Performed by: NURSE PRACTITIONER

## 2020-08-15 PROCEDURE — 700102 HCHG RX REV CODE 250 W/ 637 OVERRIDE(OP): Performed by: NURSE PRACTITIONER

## 2020-08-15 PROCEDURE — 99232 SBSQ HOSP IP/OBS MODERATE 35: CPT | Performed by: INTERNAL MEDICINE

## 2020-08-15 PROCEDURE — 770021 HCHG ROOM/CARE - ISO PRIVATE

## 2020-08-15 RX ORDER — DIVALPROEX SODIUM 125 MG/1
125 CAPSULE, COATED PELLETS ORAL EVERY 8 HOURS
Status: DISCONTINUED | OUTPATIENT
Start: 2020-08-15 | End: 2021-05-03 | Stop reason: HOSPADM

## 2020-08-15 RX ADMIN — DOCUSATE SODIUM 50 MG AND SENNOSIDES 8.6 MG 2 TABLET: 8.6; 5 TABLET, FILM COATED ORAL at 05:22

## 2020-08-15 RX ADMIN — DOCUSATE SODIUM 50 MG AND SENNOSIDES 8.6 MG 2 TABLET: 8.6; 5 TABLET, FILM COATED ORAL at 18:37

## 2020-08-15 RX ADMIN — DIVALPROEX SODIUM 125 MG: 125 CAPSULE ORAL at 21:14

## 2020-08-15 RX ADMIN — RISPERIDONE 0.5 MG: 0.5 TABLET ORAL at 18:37

## 2020-08-15 RX ADMIN — AMOXICILLIN AND CLAVULANATE POTASSIUM 1 TABLET: 875; 125 TABLET, FILM COATED ORAL at 18:37

## 2020-08-15 RX ADMIN — RISPERIDONE 0.5 MG: 0.5 TABLET ORAL at 05:22

## 2020-08-15 RX ADMIN — Medication 400 MG: at 18:37

## 2020-08-15 RX ADMIN — OXYCODONE HYDROCHLORIDE 5 MG: 5 TABLET ORAL at 05:27

## 2020-08-15 RX ADMIN — Medication 400 MG: at 05:22

## 2020-08-15 RX ADMIN — DIVALPROEX SODIUM 125 MG: 125 CAPSULE ORAL at 15:22

## 2020-08-15 RX ADMIN — HALOPERIDOL LACTATE 5 MG: 5 INJECTION, SOLUTION INTRAMUSCULAR at 05:22

## 2020-08-15 RX ADMIN — AMLODIPINE BESYLATE 5 MG: 5 TABLET ORAL at 05:23

## 2020-08-15 RX ADMIN — AMOXICILLIN AND CLAVULANATE POTASSIUM 1 TABLET: 875; 125 TABLET, FILM COATED ORAL at 05:22

## 2020-08-15 ASSESSMENT — LIFESTYLE VARIABLES
HEADACHE, FULLNESS IN HEAD: NOT PRESENT
TOTAL SCORE: 3
HEADACHE, FULLNESS IN HEAD: NOT PRESENT
TREMOR: NO TREMOR
ANXIETY: NO ANXIETY (AT EASE)
AGITATION: NORMAL ACTIVITY
NAUSEA AND VOMITING: NO NAUSEA AND NO VOMITING
VISUAL DISTURBANCES: NOT PRESENT
AUDITORY DISTURBANCES: NOT PRESENT
AUDITORY DISTURBANCES: NOT PRESENT
NAUSEA AND VOMITING: NO NAUSEA AND NO VOMITING
TOTAL SCORE: 3
TREMOR: NO TREMOR
PAROXYSMAL SWEATS: NO SWEAT VISIBLE
ORIENTATION AND CLOUDING OF SENSORIUM: DATE DISORIENTATION BY MORE THAN TWO CALENDAR DAYS
VISUAL DISTURBANCES: NOT PRESENT
PAROXYSMAL SWEATS: NO SWEAT VISIBLE
ANXIETY: NO ANXIETY (AT EASE)
AGITATION: NORMAL ACTIVITY
ORIENTATION AND CLOUDING OF SENSORIUM: DATE DISORIENTATION BY MORE THAN TWO CALENDAR DAYS

## 2020-08-15 NOTE — WOUND TEAM
[facility]  Inpatient Services  Wound and Skin Care Progress Note    Admission Date: 8/7/2020     Last order of IP CONSULT TO WOUND CARE was found on 8/7/2020 from Hospital Encounter on 8/7/2020     HPI, PMH, SH: Reviewed    Unit where seen by Wound Team: [id number]     WOUND CONSULT/FOLLOW UP RELATED TO:  Left leg chronic wound      Self Report / Pain Level:  No s/s of pain        OBJECTIVE:  In bed. Nursing .      WOUND TYPE, LOCATION, CHARACTERISTICS (Pressure Injuries: location, stage, POA or date identified)    Skin Risk/[place]   Sensory Perception: No Impairment  Moisture: Rarely Moist  Activity: Walks Frequently  Mobility: No Limitations  Nutrition: Adequate  Friction and Shear: No Apparent Problem  Total Score: 22  Skin Breakdown Risk: 18-23 Minimal Risk for Skin Breakdown    Sensory Interventions  Skin Preventative Measures: Pillows in Use for Support / Positioning     PT / OT Involved in Care: Physical Therapy Involved, Occupational Therapy Involved  Activity : Ambulated, Up to bathroom  Patient Turns / Repositioning: Patient Turns Self from Side to Side  Patient is Receiving Nutrition: Oral Intake Adequate     Vitamin Therapy in Use: No  Friction Interventions: Draw Sheet / Pad Used for Repositioning                         Wound 02/17/20 Traumatic LLE medial/lateral full thickness wound (Active)   Wound Image   08/15/20 1323       Wound 04/02/20 Full Thickness Wound Knee Left (Active)       Wound 08/07/20 Full Thickness Wound Leg Outer;Inner Left medial extending posterior  (Active)   Wound Image    08/09/20 1600   Site Assessment Red;[place] 08/15/20 1600   Periwound Assessment Clean;Dry;Intact 08/15/20 1600   Margins Epibole (rolled edges);Defined edges;Attached edges 08/15/20 1600   Closure Secondary intention 08/15/20 1600   Drainage Amount Small 08/15/20 1600   Drainage Description Serosanguineous 08/15/20 1600   Treatments Cleansed 08/15/20 0930   Wound Cleansing Approved Wound Cleanser  08/15/20 1600   Periwound Protectant Not Applicable 08/15/20 1600   Dressing Cleansing/Solutions Not Applicable 08/15/20 1600   Dressing Options Calcium Alginate;Collagen Dressing;Mepilex Heel 08/15/20 1600   Dressing Changed Changed 08/15/20 1600   Dressing Status Clean;Dry;Intact 08/15/20 1600   Dressing Change/Treatment Frequency By Wound Team Only 08/15/20 1600   NEXT Dressing Change/Treatment Date 08/19/20 08/15/20 1600   NEXT Weekly Photo (Inpatient Only) 08/22/20 08/15/20 1600   Non-staged Wound Description Full thickness 08/15/20 1600   Wound Length (cm) 35 cm 08/15/20 1600   Wound Width (cm) 5 cm 08/15/20 1600   Wound Depth (cm) 0.5 cm 08/15/20 1600   Wound Surface Area (cm^2) 175 cm^2 08/15/20 1600   Wound Volume (cm^3) 87.5 cm^3 08/15/20 1600   Wound Healing % -66 08/15/20 1600   Shape elongated 08/15/20 1600   Wound Odor None 08/15/20 1600   Exposed Structures None 08/15/20 1600   WOUND NURSE ONLY - Time Spent with Patient (mins) 60 08/15/20 1600             Vascular:    KOSTAS:   No results found.    Lab Values:    Lab Results   Component Value Date/Time    WBC 6.6 08/09/2020 01:22 AM    RBC 3.48 (L) 08/09/2020 01:22 AM    HEMOGLOBIN 10.4 (L) 08/09/2020 01:22 AM    HEMATOCRIT 31.7 (L) 08/09/2020 01:22 AM    CREACTPROT 1.07 (H) 08/12/2020 01:55 PM    SEDRATEWES 85 (H) 08/07/2020 01:20 PM    HBA1C 5.7 (H) 11/09/2018 06:30 PM        Culture Results show:  Recent Results (from the past 720 hour(s))   CULTURE WOUND W/ GRAM STAIN    Collection Time: 08/07/20  1:20 PM    Specimen: Left Leg; Wound   Result Value Ref Range    Significant Indicator POS (POS)     Source WND     Site LEFT LEG     Culture Result Light growth mixed skin lotus. (A)     Gram Stain Result       Few Gram positive rods.  Few Gram positive cocci.  Few WBCs.      Culture Result (A)      Staphylococcus aureus  Moderate growth  Methicillin sensitive via screening method  This isolate is presumed to be clindamycin resistant based on  detection of  inducible resistance.  Clindamycin may still  be effective in some patients.      Culture Result (A)      Beta Hemolytic Streptococcus group A  Moderate growth         Susceptibility    Staphylococcus aureus - CATHERINE     Azithromycin >4 Resistant mcg/mL     Clindamycin <=0.5 Resistant mcg/mL     Cefazolin <=8 Sensitive mcg/mL     Ceftaroline <=0.5 Sensitive mcg/mL     Daptomycin <=1 Sensitive mcg/mL     Ampicillin/sulbactam <=8/4 Sensitive mcg/mL     Erythromycin >4 Resistant mcg/mL     Vancomycin 1 Sensitive mcg/mL     Oxacillin <=0.25 Sensitive mcg/mL     Penicillin >8 Resistant mcg/mL     Pip/Tazobactam <=4 Sensitive mcg/mL     Trimeth/Sulfa <=0.5/9.5 Sensitive mcg/mL     Tetracycline <=4 Sensitive mcg/mL       INTERVENTIONS BY WOUND TEAM:  Dressing removed wound cleansed with saline, dressed with a combination of alginate and promogran moistened with saline, covered with mepilex and spandage.        Interdisciplinary consultation: Patient, Bedside RN (Darshana)     EVALUATION: 8/15/2020 - wound bed red, slighly shiny, appears to be responding to collagen dressing.  Pt has thick indurated edges that will delay healing.  This wound is also very chronic and cellular activity is clearly diminished, collagen should help encourage tissue growth.        Patient seen on previous admit for same wound.  Per pt, wound was obtained after a traumatic fall about 2 years ago.  Patient has been non compliant with follow up.      Goals: Steady decrease in wound area and depth weekly.    NURSING PLAN OF CARE ORDERS (X):    Dressing changes: See Dressing Care orders: dressing changes per wound team  Skin care: See Skin Care orders: moves self   Rectal tube care: See Rectal Tube Care orders:   Other orders:    WOUND TEAM PLAN OF CARE:   Dressing changes by wound team:                 X  Follow up 3 times weekly:                NPWT change 3 times weekly:     Follow up 1-2 times weekly:      Follow up Bi-Monthly:                    Follow up as needed:       Other (explain):     Anticipated discharge plans: will need ongoing wound care post DC - non compliant   LTACH:        SNF/Rehab:                  Home Health Care:           Outpatient Wound Center:          Self Care:

## 2020-08-15 NOTE — CARE PLAN
Problem: Safety  Goal: Will remain free from falls  Outcome: PROGRESSING AS EXPECTED  Note: Safety precautions in place. Bed in lowest and locked position. Call light and personal belongings within reach      Problem: Infection  Goal: Will remain free from infection  Outcome: PROGRESSING AS EXPECTED  Note: Contact precautions in place

## 2020-08-15 NOTE — CARE PLAN
Problem: Safety  Goal: Will remain free from falls  Outcome: PROGRESSING AS EXPECTED     Problem: Infection  Goal: Will remain free from infection  Outcome: PROGRESSING AS EXPECTED     Problem: Venous Thromboembolism (VTW)/Deep Vein Thrombosis (DVT) Prevention:  Goal: Patient will participate in Venous Thrombosis (VTE)/Deep Vein Thrombosis (DVT)Prevention Measures  Outcome: PROGRESSING AS EXPECTED     Problem: Fluid Volume:  Goal: Will maintain balanced intake and output  Outcome: PROGRESSING AS EXPECTED     Problem: Pain Management  Goal: Pain level will decrease to patient's comfort goal  Outcome: PROGRESSING AS EXPECTED

## 2020-08-15 NOTE — PROGRESS NOTES
Hospital Medicine Daily Progress Note    Date of Service  8/15/2020    Chief Complaint  LLE wound    Hospital Course   Mr. Varela is a 64-year-old male with PMH significant for homelessness, etoh use, tobacco dependence and chronic LLE wound who presented 8/7/2020 with LLE wound infection.  He was hospitalized at our facility in April and evaluated by orthopedic surgery who recommended wound care.  Wound cultures at that time grew MSSA and strep.  Patient reported losing his Medicaid card and having no transportation to wound clinic.  He was seen at [facility] earlier this week and prescribed ABX, however he has not filled the prescription.  US LLE negative for DVT.  Treated for sepsis with empiric ABX and wound care.  He was found to have head lice and underwent treatments.        Interval Problem Update  - No acute events overnight  - Yesterday afternoon did have episode of agitation, put his regular clothes on and was trying to leave, security was called and patient was placed back in gown, added risperidone yesterday, LFTs okay today, will add depakote today  - This morning during rounds was calm and cooperative, drinking his coffee  - Afebrile      Consultants/Specialty  Psychiatry    Code Status  Full Code    Disposition  Will need guardianship and placement    Review of Systems  Review of Systems   Unable to perform ROS: Psychiatric disorder        Physical Exam  Temp:  [36.5 °C (97.7 °F)-37 °C (98.6 °F)] 37 °C (98.6 °F)  Pulse:  [] 84  Resp:  [16-18] 16  BP: (142-158)/(72-93) 143/80  SpO2:  [97 %-98 %] 97 %    Physical Exam  Vitals signs and nursing note reviewed. Exam conducted with a chaperone present.   Constitutional:       General: He is awake. He is not in acute distress.     Appearance: He is underweight. He is ill-appearing.   HENT:      Nose: Nose normal.      Mouth/Throat:      Lips: Pink.      Mouth: Mucous membranes are moist.   Eyes:      General: No scleral icterus.      Conjunctiva/sclera: Conjunctivae normal.   Neck:      Musculoskeletal: Normal range of motion.   Cardiovascular:      Rate and Rhythm: Normal rate and regular rhythm.      Heart sounds: Normal heart sounds.   Pulmonary:      Effort: Pulmonary effort is normal.      Breath sounds: Normal breath sounds.   Abdominal:      General: Bowel sounds are normal.      Palpations: Abdomen is soft.      Tenderness: There is no abdominal tenderness. There is no guarding or rebound.   Musculoskeletal:      Right lower leg: No edema.      Left lower leg: No edema.   Skin:     General: Skin is warm and dry.      Comments: Left leg wounds without surrounding erythema or drainage.  Dressings off this am   Neurological:      Mental Status: He is alert.      Gait: Gait normal.   Psychiatric:         Mood and Affect: Mood and affect normal.         Behavior: Behavior is not aggressive or combative. Behavior is cooperative.      Comments:            Fluids  No intake or output data in the 24 hours ending 08/15/20 1213    Laboratory      Recent Labs     08/15/20  0849   SODIUM 135   POTASSIUM 3.9   CHLORIDE 97   CO2 23   GLUCOSE 171*   BUN 17   CREATININE 0.94   CALCIUM 9.8                   Imaging  CT-HEAD W/O   Final Result      No acute intracranial abnormality      DX-TIBIA AND FIBULA LEFT   Final Result      1.  Healed distal metaphyseal fractures of the left fibula and tibia with tibial IM layla in place.      2.  No acute fracture or dislocation.      3.  No radiopaque soft tissue foreign body.      DX-FEMUR-2+ LEFT   Final Result      1.  No radiographic evidence of acute traumatic injury left femur.      2.  Unchanged cortical thickening in the posterior aspect of the distal femoral diaphysis which may represent sequela of prior injury or infection.      3.  No soft tissue foreign body identified.      US-EXTREMITY VENOUS LOWER UNILAT LEFT   Final Result      DX-CHEST-PORTABLE (1 VIEW)   Final Result      No acute cardiopulmonary  abnormality.           Assessment/Plan  Open wound of left lower leg- (present on admission)  Assessment & Plan  -chronic  -poor compliance with OP wound care. He can't remember the last time he had his dressing changed OP  -wound care  -wound culture pending - Previous cultures grew MSSA and group A strep.  Preliminary wound cultures from this hospitalization also growing MSSA and group A strep  -blood cultures (-) to date  -afebrile. Leukocytosis resolved  -continue ABX unasyn 8/7-8/11 -->augmentin 8/11, end date 8/21  -LLE US (-) DVT    Hypomagnesemia  Assessment & Plan  -Likely related to EtOH abuse history  -Daily supplementation  -Follow labs as clinically indicated    Hypokalemia- (present on admission)  Assessment & Plan  -Also with hypomagnesemia  -Resolved after replacement    Flexion contracture of knee, 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)  Assessment & Plan  -secondary to chronic wound in that area  -PT OT  -encourage mobility      Head lice  Assessment & Plan  Head lice found  S/p first round of treatment 8/10, treated again on 8/13 given active lice seen by CNA, will need second treatment 8/17    Encephalopathy- (present on admission)  Assessment & Plan  -acute on chronic  -likely due to significant EtOH history.  Possible component of Warnicke's encephalopathy  -Psychiatry evaluated him and deemed him incapacitated to make medical decision or leave AMA and recommend SLP cognitive eval.  Patient will likely require guardianship and placement  -CT head unremarkable  -no acute neurological deficits  -Avoid narcotics and hypnotics.  Cautious use of benzodiazepines for alcohol withdrawal protocol  -Frequent reorientation  -Sleep hygiene      Non-compliance  Assessment & Plan  -likely also component of psychiatric disorder and ETOH abuse  -Psychiatric consult today suggests patient is incapacitated to make medical decisions or leave AMA  -ongoing encouragement for compliance.    Psychiatric disorder  Assessment &  "Plan  -unknown/unspecified  -not currently on medication  -history of severe alcohol dependence.  -psychiatry consult today for agitation - incapacitated to leave AMA or make medical decisions, appreciate recs: risperdol 0.5mg BID, added depakote 125mg TID (8/15/20, watch LFTs)  (if LFTs okay, if needed can add neurotin 100mg TID      Alcohol dependence (HCC)- (present on admission)  Assessment & Plan  -history of.  Ongoing.  He reports drinking \"once in a while\"  -withdrawal previous hospitalizations  -with anxiety  -CIWA 4-13  -seizure precautions  -MVI and thiamine    Protein malnutrition (HCC)  Assessment & Plan  -history of ETOH and homelessness  -dietary consult  -TID supplements  -encourage PO intake    Sepsis (HCC)- (present on admission)  Assessment & Plan  -This is Sepsis Present on admission  SIRS criteria identified on my evaluation include: Leukocytosis, with WBC greater than 12,000   Source is cellulitis and wound infection  Sepsis protocol initiated  Fluid resuscitation ordered per protocol  IV antibiotics as appropriate for source of sepsis  While organ dysfunction may be noted elsewhere in this problem list or in the chart, degree of organ dysfunction does not meet CMS criteria for severe sepsis  -afebrile  -lactic WNL  -blood cultures negative to date  -Sepsis has resolved        Tobacco dependence- (present on admission)  Assessment & Plan  -Nicotine replacement protocol         VTE prophylaxis: Enoxaparin            "

## 2020-08-15 NOTE — PROGRESS NOTES
"Patient found redressed in personal clothing. Patient threatening to leave AMA, but this nurse explained to patient that patient was not capacitated to make decisions at this time or leave AMA, per Psych. Patient very agitated and punching self repeatedly in the head, screaming, \" Is this what you want? You can't make me stay here\". Unable to calm patient down or rationalize with him. Security called to help place patient in hospital clothing. Clothing quadruple bagged and placed in anteroom.   "

## 2020-08-15 NOTE — PROGRESS NOTES
"Patient states,\" Where's my wallet?\". This nurse informed patient that there was no wallet placed in bag with the clothing that was taken out of room, as patient himself placed all belongings in bags , in front of security as well.   "

## 2020-08-16 PROCEDURE — 700102 HCHG RX REV CODE 250 W/ 637 OVERRIDE(OP): Performed by: HOSPITALIST

## 2020-08-16 PROCEDURE — 700102 HCHG RX REV CODE 250 W/ 637 OVERRIDE(OP): Performed by: INTERNAL MEDICINE

## 2020-08-16 PROCEDURE — 700111 HCHG RX REV CODE 636 W/ 250 OVERRIDE (IP): Performed by: NURSE PRACTITIONER

## 2020-08-16 PROCEDURE — A9270 NON-COVERED ITEM OR SERVICE: HCPCS | Performed by: HOSPITALIST

## 2020-08-16 PROCEDURE — 770021 HCHG ROOM/CARE - ISO PRIVATE

## 2020-08-16 PROCEDURE — 700102 HCHG RX REV CODE 250 W/ 637 OVERRIDE(OP): Performed by: NURSE PRACTITIONER

## 2020-08-16 PROCEDURE — 92610 EVALUATE SWALLOWING FUNCTION: CPT

## 2020-08-16 PROCEDURE — 700111 HCHG RX REV CODE 636 W/ 250 OVERRIDE (IP): Performed by: HOSPITALIST

## 2020-08-16 PROCEDURE — A9270 NON-COVERED ITEM OR SERVICE: HCPCS | Performed by: INTERNAL MEDICINE

## 2020-08-16 PROCEDURE — 99232 SBSQ HOSP IP/OBS MODERATE 35: CPT | Performed by: INTERNAL MEDICINE

## 2020-08-16 PROCEDURE — A9270 NON-COVERED ITEM OR SERVICE: HCPCS | Performed by: NURSE PRACTITIONER

## 2020-08-16 RX ORDER — GABAPENTIN 100 MG/1
100 CAPSULE ORAL 3 TIMES DAILY
Status: DISCONTINUED | OUTPATIENT
Start: 2020-08-16 | End: 2020-10-17

## 2020-08-16 RX ORDER — AMOXICILLIN AND CLAVULANATE POTASSIUM 875; 125 MG/1; MG/1
1 TABLET, FILM COATED ORAL EVERY 12 HOURS
Status: COMPLETED | OUTPATIENT
Start: 2020-08-16 | End: 2020-08-21

## 2020-08-16 RX ADMIN — AMOXICILLIN AND CLAVULANATE POTASSIUM 1 TABLET: 875; 125 TABLET, FILM COATED ORAL at 17:00

## 2020-08-16 RX ADMIN — DIVALPROEX SODIUM 125 MG: 125 CAPSULE ORAL at 23:39

## 2020-08-16 RX ADMIN — ACETAMINOPHEN 650 MG: 325 TABLET, FILM COATED ORAL at 23:39

## 2020-08-16 RX ADMIN — RISPERIDONE 0.5 MG: 0.5 TABLET ORAL at 06:32

## 2020-08-16 RX ADMIN — DOCUSATE SODIUM 50 MG AND SENNOSIDES 8.6 MG 2 TABLET: 8.6; 5 TABLET, FILM COATED ORAL at 06:33

## 2020-08-16 RX ADMIN — DIVALPROEX SODIUM 125 MG: 125 CAPSULE ORAL at 16:02

## 2020-08-16 RX ADMIN — Medication 400 MG: at 06:33

## 2020-08-16 RX ADMIN — GABAPENTIN 100 MG: 100 CAPSULE ORAL at 16:02

## 2020-08-16 RX ADMIN — OXYCODONE HYDROCHLORIDE 5 MG: 5 TABLET ORAL at 23:39

## 2020-08-16 RX ADMIN — AMLODIPINE BESYLATE 5 MG: 5 TABLET ORAL at 06:33

## 2020-08-16 RX ADMIN — DIVALPROEX SODIUM 125 MG: 125 CAPSULE ORAL at 06:32

## 2020-08-16 RX ADMIN — AMOXICILLIN AND CLAVULANATE POTASSIUM 1 TABLET: 875; 125 TABLET, FILM COATED ORAL at 06:32

## 2020-08-16 RX ADMIN — OXYCODONE HYDROCHLORIDE 5 MG: 5 TABLET ORAL at 03:17

## 2020-08-16 RX ADMIN — HALOPERIDOL LACTATE 5 MG: 5 INJECTION, SOLUTION INTRAMUSCULAR at 08:00

## 2020-08-16 RX ADMIN — ACETAMINOPHEN 650 MG: 325 TABLET, FILM COATED ORAL at 03:17

## 2020-08-16 RX ADMIN — RISPERIDONE 0.5 MG: 0.5 TABLET ORAL at 16:02

## 2020-08-16 RX ADMIN — Medication 400 MG: at 16:02

## 2020-08-16 RX ADMIN — DOCUSATE SODIUM 50 MG AND SENNOSIDES 8.6 MG 2 TABLET: 8.6; 5 TABLET, FILM COATED ORAL at 16:01

## 2020-08-16 NOTE — CARE PLAN
Problem: Safety  Goal: Will remain free from falls  Outcome: PROGRESSING AS EXPECTED     Problem: Mobility  Goal: Risk for activity intolerance will decrease  Outcome: PROGRESSING AS EXPECTED     Problem: Psychosocial Needs:  Goal: Level of anxiety will decrease  Outcome: PROGRESSING SLOWER THAN EXPECTED

## 2020-08-16 NOTE — PROGRESS NOTES
Pt is A&Ox2. Pt agitated this AM at 0745, trying to leave. Security called. Pt assisted back to room. Haldol given. Notified Dr. Tim that medications may need to be adjusted as pt getting agitated on a daily basis. MD stated she would add gabapentin. Pt also having cough/vomiting after eating breakfast and then snack. Notified Dr. Tim- order received for NPO and swallow eval. Notified SLP. Pt becoming agitated again at 1240. Pt focused on his belongings and leaving. Belongings locked up out of sight. Sitter able to calm pt down. 1:1 sitter at bedside.

## 2020-08-16 NOTE — CARE PLAN
Problem: Safety  Goal: Will remain free from falls  Outcome: PROGRESSING AS EXPECTED   Bed locked and in lowest position. Call light within reach. Threaded sock in place. CNA safety sitter at bed side. Hourly rounding on going.  Problem: Venous Thromboembolism (VTW)/Deep Vein Thrombosis (DVT) Prevention:  Goal: Patient will participate in Venous Thrombosis (VTE)/Deep Vein Thrombosis (DVT)Prevention Measures  Outcome: PROGRESSING AS EXPECTED   Patient ambulates frequently. Patient receives Lovenox for DVT prophylaxis per MAR.

## 2020-08-16 NOTE — PROGRESS NOTES
Patient very impulsive today, restless and keeps wanting to leave the hospital.  Reorienting him all day long, you can tell him he is in the hospital but he will forget minutes later.  Sitter was kept busy today. Dressed his wound, due to patient taking the dressing off.  Also wound care came and rederessed wound also.patient safe with a sitter as of shift change.

## 2020-08-16 NOTE — THERAPY
Speech Language Pathology   Clinical Swallow Evaluation     Patient Name: Bola Varela  AGE:  64 y.o., SEX:  male  Medical Record #: [id number]  Today's Date: 8/16/2020     Precautions  Precautions: Fall Risk, Swallow Precautions ( See Comments)  Comments: poor short term memory and problem solving    Assessment    Patient is a 64 y.o. male who was admitted with chronic left lower extremity wound, sepsis and lice. He was placed on the CIWA protocol at admit given his history of etoh use. He also has a history of impaired memory, safety awareness, insight and problem solving.   Per RN the patient was noted to be coughing during breakfast and then vomited. Clinical swallow evaluation ordered and completed this afternoon. Upon entering the patient's room he had a wet vocal quality and spit out thick stringy phlegm upon command. He consumed PO trials of ice, thin, liquidised, puree, soft solids, regular solids and mixed textures. He had no overt signs of aspiration during the first few bites of puree-type foods and solids but would start talking with his mouth full and then cough. No overt signs of aspiration appreciated with liquids as he always swallowed the bolus prior to talking. I attempted to leave the room while he ate the rest of the food and watch him through the window. However, he stood up and walked over to the window, food in hand, still chewing and talking. His laryngeal elevation was palpated as complete and his swallow was timely when he wasn't talking.  Recommend to downgrade to Minced and Moist foods and thin liquids with limited distractions and cues for swallow strategies.     Plan    Recommend Speech Therapy 3 times per week until therapy goals are met for the following treatments:  Dysphagia Training and Patient / Family / Caregiver Education.    Discharge Recommendations: Recommend post-acute placement for additional speech therapy services prior to discharge home     Objective       08/16/20  "1603   Prior Living Situation   Prior Services None   Housing / Facility Homeless   Equipment Owned None   Lives with - Patient's Self Care Capacity Alone and Unable to Care For Self   Prior Level Of Function   Communication Impaired   Swallow Impaired  (pt reports difficulty with tough food)   Dentition Poor Quality   (missing multiple teeth \"I did a lot of cocaine\")   Hearing Within Functional Limits for Evaluation   Vision Within Functional Limits for Evaluation   Patient's Primary Language English   Occupation (Pre-Hospital Vocational) Not Employed;Retired Due To Disability   Oral Motor Eval    Is Patient Able to Complete Oral Motor Eval Yes, Within Normal Limits   Laryngeal Function   Voice Quality Minimal  (wet vocal quality prior to PO intake)   Volutional Cough Within Functional Limits   Excursion Upon Swallow Complete   Max Phonation Time (Seconds) 5   Oral Food Presentation   Ice Chips Within Functional Limits   Single Swallow Thin (0) Within Functional Limits   Serial Swallow Thin (0) Within Functional Limits   Liquidised (3) Within Functional Limits   Pureed (4) Within Functional Limits   Soft & Bite-Sized (6) - (Dysphagia III) Minimal   Regular (7) Moderate   Self Feeding Independent   Dysphagia Strategies / Recommendations   Strategies / Interventions Recommended (Yes / No) Yes   Compensatory Strategies Monitor During Meals;Assistance Needed for Meal Tray Set-up;Head of Bed 90 Degrees During Eating / Drinking;Single Sips / Bites;No Talking During Eating / Drinking   Diet / Liquid Recommendation Minced & Moist (5) - (Dysphagia II);Thin (0)   Medication Administration  Float Whole with Puree   Therapy Interventions Dysphagia Therapy By Speech Language Pathologist   Dysphagia Rating   Nutritional Liquid Intake Rating Scale Non thickened beverages   Nutritional Food Intake Rating Scale Total oral diet with multiple consistencies but requiring special preparation or compensations   Patient / Family Goals " "  Patient / Family Goal #1 \"I really would like something to eat\"   Short Term Goals   Short Term Goal # 2 The patient will consume MM5/MT2 without signs of aspiration given moderate cues for swallow precautions         "

## 2020-08-16 NOTE — PROGRESS NOTES
Hospital Medicine Daily Progress Note    Date of Service  8/16/2020    Chief Complaint  LLE wound    Hospital Course   Mr. Varela is a 64-year-old male with PMH significant for homelessness, etoh use, tobacco dependence and chronic LLE wound who presented 8/7/2020 with LLE wound infection.  He was hospitalized at our facility in April and evaluated by orthopedic surgery who recommended wound care.  Wound cultures at that time grew MSSA and strep.  Patient reported losing his Medicaid card and having no transportation to wound clinic.  He was seen at [facility] earlier this week and prescribed ABX, however he has not filled the prescription.  US LLE negative for DVT.  Treated for sepsis with empiric ABX and wound care.  He was found to have head lice and underwent treatments.        Interval Problem Update  - Still having intermittent agitation and confusion, again doesn't seem to be WD anymore, started risperdol, depakote and today will add neurontin, may need psych to see again  - Afebrile    Consultants/Specialty  Psychiatry    Code Status  Full Code    Disposition  Will need guardianship and placement    Review of Systems  Review of Systems   Unable to perform ROS: Psychiatric disorder        Physical Exam  Temp:  [36.5 °C (97.7 °F)-36.8 °C (98.2 °F)] 36.6 °C (97.9 °F)  Pulse:  [70-92] 84  Resp:  [16-17] 16  BP: (125-140)/() 125/77  SpO2:  [90 %-100 %] 96 %    Physical Exam  Vitals signs and nursing note reviewed. Exam conducted with a chaperone present.   Constitutional:       General: He is awake. He is not in acute distress.     Appearance: He is underweight. He is ill-appearing.   HENT:      Nose: Nose normal.      Mouth/Throat:      Lips: Pink.      Mouth: Mucous membranes are moist.   Eyes:      General: No scleral icterus.     Conjunctiva/sclera: Conjunctivae normal.   Neck:      Musculoskeletal: Normal range of motion.   Cardiovascular:      Rate and Rhythm: Normal rate and regular rhythm.       Heart sounds: Normal heart sounds.   Pulmonary:      Effort: Pulmonary effort is normal.      Breath sounds: Normal breath sounds.   Abdominal:      General: Bowel sounds are normal.      Palpations: Abdomen is soft.      Tenderness: There is no abdominal tenderness. There is no guarding or rebound.   Musculoskeletal:      Right lower leg: No edema.      Left lower leg: No edema.   Skin:     General: Skin is warm and dry.      Comments: Left leg wounds without surrounding erythema or drainage.     Neurological:      Mental Status: He is alert.      Gait: Gait normal.   Psychiatric:         Mood and Affect: Mood and affect normal.         Behavior: Behavior is not aggressive or combative. Behavior is cooperative.      Comments:            Fluids  No intake or output data in the 24 hours ending 08/16/20 1416    Laboratory      Recent Labs     08/15/20  0849   SODIUM 135   POTASSIUM 3.9   CHLORIDE 97   CO2 23   GLUCOSE 171*   BUN 17   CREATININE 0.94   CALCIUM 9.8                   Imaging  CT-HEAD W/O   Final Result      No acute intracranial abnormality      DX-TIBIA AND FIBULA LEFT   Final Result      1.  Healed distal metaphyseal fractures of the left fibula and tibia with tibial IM layla in place.      2.  No acute fracture or dislocation.      3.  No radiopaque soft tissue foreign body.      DX-FEMUR-2+ LEFT   Final Result      1.  No radiographic evidence of acute traumatic injury left femur.      2.  Unchanged cortical thickening in the posterior aspect of the distal femoral diaphysis which may represent sequela of prior injury or infection.      3.  No soft tissue foreign body identified.      US-EXTREMITY VENOUS LOWER UNILAT LEFT   Final Result      DX-CHEST-PORTABLE (1 VIEW)   Final Result      No acute cardiopulmonary abnormality.           Assessment/Plan  Open wound of left lower leg- (present on admission)  Assessment & Plan  -chronic  -poor compliance with OP wound care. He can't remember the last  time he had his dressing changed OP  -wound care  -wound culture pending - Previous cultures grew MSSA and group A strep.  Preliminary wound cultures from this hospitalization also growing MSSA and group A strep  -blood cultures (-) to date  -afebrile. Leukocytosis resolved  -continue ABX unasyn 8/7-8/11 -->augmentin 8/11, end date 8/21  -LLE US (-) DVT    Hypomagnesemia  Assessment & Plan  -Likely related to EtOH abuse history  -Daily supplementation  -Follow labs as clinically indicated    Hypokalemia- (present on admission)  Assessment & Plan  -Also with hypomagnesemia  -Resolved after replacement    Flexion contracture of knee, 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)  Assessment & Plan  -secondary to chronic wound in that area  -PT OT  -encourage mobility      Head lice  Assessment & Plan  Head lice found  S/p first round of treatment 8/10, treated again on 8/13 given active lice seen by CNA, will need second treatment 8/17    Encephalopathy- (present on admission)  Assessment & Plan  -acute on chronic  -likely due to significant EtOH history.  Possible component of Warnicke's encephalopathy  -Psychiatry evaluated him and deemed him incapacitated to make medical decision or leave AMA and recommend SLP cognitive eval.  Patient will likely require guardianship and placement  -CT head unremarkable  -no acute neurological deficits  -Avoid narcotics and hypnotics.  Cautious use of benzodiazepines for alcohol withdrawal protocol  -Frequent reorientation  -Sleep hygiene      Non-compliance  Assessment & Plan  -likely also component of psychiatric disorder and ETOH abuse  -Psychiatric consulted and suggests patient is incapacitated to make medical decisions or leave AMA  -ongoing encouragement for compliance.    Psychiatric disorder  Assessment & Plan  -unknown/unspecified  -not currently on medication  -history of severe alcohol dependence.  -psychiatry consult today for agitation - incapacitated to leave AMA or make medical  "decisions, appreciate recs: risperdol 0.5mg BID, added depakote 125mg TID (8/15/20, watch LFTs), will add neurotin 100mg TID today      Alcohol dependence (HCC)- (present on admission)  Assessment & Plan  -history of.  Ongoing.  He reports drinking \"once in a while\"  -withdrawal previous hospitalizations  -with anxiety  -CIWA 4-13  -seizure precautions  -MVI and thiamine    Protein malnutrition (HCC)  Assessment & Plan  -history of ETOH and homelessness  -dietary consult  -TID supplements  -encourage PO intake    Sepsis (HCC)- (present on admission)  Assessment & Plan  -This is Sepsis Present on admission  SIRS criteria identified on my evaluation include: Leukocytosis, with WBC greater than 12,000   Source is cellulitis and wound infection  Sepsis protocol initiated  Fluid resuscitation ordered per protocol  IV antibiotics as appropriate for source of sepsis  While organ dysfunction may be noted elsewhere in this problem list or in the chart, degree of organ dysfunction does not meet CMS criteria for severe sepsis  -afebrile  -lactic WNL  -blood cultures negative to date  -Sepsis has resolved        Tobacco dependence- (present on admission)  Assessment & Plan  -Nicotine replacement protocol         VTE prophylaxis: Enoxaparin            "

## 2020-08-17 PROCEDURE — 700102 HCHG RX REV CODE 250 W/ 637 OVERRIDE(OP): Performed by: NURSE PRACTITIONER

## 2020-08-17 PROCEDURE — A9270 NON-COVERED ITEM OR SERVICE: HCPCS | Performed by: HOSPITALIST

## 2020-08-17 PROCEDURE — A9270 NON-COVERED ITEM OR SERVICE: HCPCS | Performed by: NURSE PRACTITIONER

## 2020-08-17 PROCEDURE — 770021 HCHG ROOM/CARE - ISO PRIVATE

## 2020-08-17 PROCEDURE — 99232 SBSQ HOSP IP/OBS MODERATE 35: CPT | Performed by: INTERNAL MEDICINE

## 2020-08-17 PROCEDURE — A9270 NON-COVERED ITEM OR SERVICE: HCPCS | Performed by: INTERNAL MEDICINE

## 2020-08-17 PROCEDURE — 700102 HCHG RX REV CODE 250 W/ 637 OVERRIDE(OP): Performed by: HOSPITALIST

## 2020-08-17 PROCEDURE — 700102 HCHG RX REV CODE 250 W/ 637 OVERRIDE(OP): Performed by: INTERNAL MEDICINE

## 2020-08-17 PROCEDURE — 700101 HCHG RX REV CODE 250: Performed by: HOSPITALIST

## 2020-08-17 RX ORDER — LORAZEPAM 2 MG/ML
1 INJECTION INTRAMUSCULAR EVERY 4 HOURS PRN
Status: DISCONTINUED | OUTPATIENT
Start: 2020-08-17 | End: 2020-08-26

## 2020-08-17 RX ADMIN — DIVALPROEX SODIUM 125 MG: 125 CAPSULE ORAL at 21:59

## 2020-08-17 RX ADMIN — RISPERIDONE 0.5 MG: 0.5 TABLET ORAL at 06:07

## 2020-08-17 RX ADMIN — Medication 400 MG: at 18:05

## 2020-08-17 RX ADMIN — OXYCODONE HYDROCHLORIDE 5 MG: 5 TABLET ORAL at 12:41

## 2020-08-17 RX ADMIN — AMOXICILLIN AND CLAVULANATE POTASSIUM 1 TABLET: 875; 125 TABLET, FILM COATED ORAL at 06:07

## 2020-08-17 RX ADMIN — GABAPENTIN 100 MG: 100 CAPSULE ORAL at 06:08

## 2020-08-17 RX ADMIN — AMLODIPINE BESYLATE 5 MG: 5 TABLET ORAL at 06:16

## 2020-08-17 RX ADMIN — GABAPENTIN 100 MG: 100 CAPSULE ORAL at 18:05

## 2020-08-17 RX ADMIN — OXYCODONE HYDROCHLORIDE 5 MG: 5 TABLET ORAL at 18:21

## 2020-08-17 RX ADMIN — DIVALPROEX SODIUM 125 MG: 125 CAPSULE ORAL at 14:33

## 2020-08-17 RX ADMIN — Medication 400 MG: at 06:07

## 2020-08-17 RX ADMIN — DIVALPROEX SODIUM 125 MG: 125 CAPSULE ORAL at 06:08

## 2020-08-17 RX ADMIN — AMOXICILLIN AND CLAVULANATE POTASSIUM 1 TABLET: 875; 125 TABLET, FILM COATED ORAL at 18:05

## 2020-08-17 RX ADMIN — OXYCODONE HYDROCHLORIDE 5 MG: 5 TABLET ORAL at 21:59

## 2020-08-17 RX ADMIN — GABAPENTIN 100 MG: 100 CAPSULE ORAL at 12:41

## 2020-08-17 RX ADMIN — DOCUSATE SODIUM 50 MG AND SENNOSIDES 8.6 MG 2 TABLET: 8.6; 5 TABLET, FILM COATED ORAL at 18:05

## 2020-08-17 RX ADMIN — POLYETHYLENE GLYCOL 3350 1 PACKET: 17 POWDER, FOR SOLUTION ORAL at 06:08

## 2020-08-17 RX ADMIN — RISPERIDONE 0.5 MG: 0.5 TABLET ORAL at 18:05

## 2020-08-17 RX ADMIN — DOCUSATE SODIUM 50 MG AND SENNOSIDES 8.6 MG 2 TABLET: 8.6; 5 TABLET, FILM COATED ORAL at 06:07

## 2020-08-17 NOTE — CARE PLAN
Problem: Safety  Goal: Will remain free from falls  Outcome: PROGRESSING AS EXPECTED   Bed locked and in lowest position. Call light within reach. Thread socks in place. Safety sitter at beside. Hourly rounding on going. 1:1 safety sitter at bed side.   Problem: Pain Management  Goal: Pain level will decrease to patient's comfort goal  Outcome: PROGRESSING AS EXPECTED   Patient able to verbalize pain on a 0/10 scale. Pain being controlled with prn pain medication per MAR.

## 2020-08-17 NOTE — CARE PLAN
Problem: Safety  Goal: Will remain free from falls  Outcome: PROGRESSING AS EXPECTED  Note: Treaded socks in place, Side rails up x2.  Bed in low, locked position.  Call light within reach,  Patient does not use call light and is impulsive.  Safety sitter is in place to help redirect patient.      Problem: Pain Management  Goal: Pain level will decrease to patient's comfort goal  Outcome: PROGRESSING AS EXPECTED  Flowsheets  Taken 8/17/2020 1338  Non Verbal Scale:   Calm   Unlabored Breathing  Taken 8/17/2020 1240  Comfort Goal:   Comfort with Movement   Sleep Comfortably  Pain Rating Scale (NPRS): 8  Note:  Patient's pain is well controlled with current prescribed interventions.

## 2020-08-17 NOTE — PROGRESS NOTES
Hospital Medicine Daily Progress Note    Date of Service  8/17/2020    Chief Complaint  LLE wound    Hospital Course   Mr. Varela is a 64-year-old male with PMH significant for homelessness, etoh use, tobacco dependence and chronic LLE wound who presented 8/7/2020 with LLE wound infection.  He was hospitalized at our facility in April and evaluated by orthopedic surgery who recommended wound care.  Wound cultures at that time grew MSSA and strep.  Patient reported losing his Medicaid card and having no transportation to wound clinic.  He was seen at [facility] earlier this week and prescribed ABX, however he has not filled the prescription.  US LLE negative for DVT.  Treated for sepsis with empiric ABX and wound care.  He was found to have head lice and underwent treatments. Continued to have intermittent agitation so was started on risperdol, depakote and gabapentin per psych recs.         Interval Problem Update  - Continues to get intermittently agitated and confused, wants his clothes and to leave (difficult since he can't leave his room d/t lice), however he's very re-directable once you change the subject and talk to her him about something else  - Afebrile  - No longer withdrawing from ETOH, dc CIWA  - had some difficulty eating yesterday, evaluated by  who downgraded his diet slightly     Consultants/Specialty  Psychiatry    Code Status  Full Code    Disposition  Will need guardianship and placement    Review of Systems  Review of Systems   Unable to perform ROS: Psychiatric disorder        Physical Exam  Temp:  [36.1 °C (97 °F)-37 °C (98.6 °F)] 37 °C (98.6 °F)  Pulse:  [79-99] 99  Resp:  [16-18] 18  BP: (131-143)/() 142/85  SpO2:  [95 %-100 %] 96 %    Physical Exam  Vitals signs and nursing note reviewed. Exam conducted with a chaperone present.   Constitutional:       General: He is awake. He is not in acute distress.     Appearance: He is underweight. He is not toxic-appearing or  diaphoretic.   HENT:      Head: Normocephalic and atraumatic.      Nose: Nose normal.      Mouth/Throat:      Lips: Pink.      Mouth: Mucous membranes are moist.   Eyes:      General: No scleral icterus.     Conjunctiva/sclera: Conjunctivae normal.   Neck:      Musculoskeletal: Normal range of motion.   Cardiovascular:      Rate and Rhythm: Normal rate.   Pulmonary:      Effort: Pulmonary effort is normal.   Abdominal:      General: There is no distension.      Tenderness: There is no abdominal tenderness.   Musculoskeletal:      Right lower leg: No edema.      Left lower leg: No edema.   Skin:     General: Skin is warm and dry.      Comments: Left leg wounds without surrounding erythema or drainage.     Neurological:      Mental Status: He is alert.      Gait: Gait normal.      Comments: Oriented x 2 (person place, thought it was 2008)  Confused intermittently   Psychiatric:         Mood and Affect: Mood and affect normal.         Behavior: Behavior is not aggressive or combative. Behavior is cooperative.      Comments:            Fluids    Intake/Output Summary (Last 24 hours) at 8/17/2020 1324  Last data filed at 8/17/2020 0800  Gross per 24 hour   Intake 240 ml   Output --   Net 240 ml       Laboratory      Recent Labs     08/15/20  0849   SODIUM 135   POTASSIUM 3.9   CHLORIDE 97   CO2 23   GLUCOSE 171*   BUN 17   CREATININE 0.94   CALCIUM 9.8                   Imaging  CT-HEAD W/O   Final Result      No acute intracranial abnormality      DX-TIBIA AND FIBULA LEFT   Final Result      1.  Healed distal metaphyseal fractures of the left fibula and tibia with tibial IM layla in place.      2.  No acute fracture or dislocation.      3.  No radiopaque soft tissue foreign body.      DX-FEMUR-2+ LEFT   Final Result      1.  No radiographic evidence of acute traumatic injury left femur.      2.  Unchanged cortical thickening in the posterior aspect of the distal femoral diaphysis which may represent sequela of prior  injury or infection.      3.  No soft tissue foreign body identified.      US-EXTREMITY VENOUS LOWER UNILAT LEFT   Final Result      DX-CHEST-PORTABLE (1 VIEW)   Final Result      No acute cardiopulmonary abnormality.           Assessment/Plan  Open wound of left lower leg- (present on admission)  Assessment & Plan  -chronic  -poor compliance with OP wound care. He can't remember the last time he had his dressing changed OP  -wound care  -wound culture pending - Previous cultures grew MSSA and group A strep.  Preliminary wound cultures from this hospitalization also growing MSSA and group A strep  -blood cultures (-) to date  -afebrile. Leukocytosis resolved  -continue ABX unasyn 8/7-8/11 -->augmentin 8/11, end date 8/21  -LLE US (-) DVT    Hypomagnesemia  Assessment & Plan  -Likely related to EtOH abuse history  -Resolved    Hypokalemia- (present on admission)  Assessment & Plan  -Also with hypomagnesemia  -Resolved after replacement    Flexion contracture of knee, 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)  Assessment & Plan  -secondary to chronic wound in that area  -PT OT  -encourage mobility      Head lice  Assessment & Plan  Head lice found  S/p first round of treatment 8/10, treated again on 8/13 given active lice seen by CNA, will need second treatment 8/17    Encephalopathy- (present on admission)  Assessment & Plan  -acute on chronic  -likely due to significant EtOH history.  Possible component of Warnicke's encephalopathy  -Psychiatry evaluated him and deemed him incapacitated to make medical decision or leave AMA and recommend SLP cognitive eval.  Patient will likely require guardianship and placement  -CT head unremarkable  -no acute neurological deficits  -Avoid narcotics and hypnotics.  Cautious use of benzodiazepines for alcohol withdrawal protocol  -Frequent reorientation  -Sleep hygiene      Non-compliance  Assessment & Plan  -likely also component of psychiatric disorder and ETOH abuse  -Psychiatric consulted  "and suggests patient is incapacitated to make medical decisions or leave AMA  -ongoing encouragement for compliance.    Psychiatric disorder  Assessment & Plan  -unknown/unspecified  -not currently on medication  -history of severe alcohol dependence.  -psychiatry consult today for agitation - incapacitated to leave AMA or make medical decisions, appreciate recs: risperdol 0.5mg BID, added depakote 125mg TID (8/15/20, watch LFTs), added neurotin 100mg TID 8/16      Alcohol dependence (HCC)- (present on admission)  Assessment & Plan  -history of.  Ongoing.  He reports drinking \"once in a while\"  -s/p WD, was on CIWA, uncomplicated  -MVI and thiamine    Protein malnutrition (HCC)  Assessment & Plan  -history of ETOH and homelessness  -dietary consult  -TID supplements  -encourage PO intake    Sepsis (HCC)- (present on admission)  Assessment & Plan  -This is Sepsis Present on admission  SIRS criteria identified on my evaluation include: Leukocytosis, with WBC greater than 12,000   Source is cellulitis and wound infection  Sepsis protocol initiated  Fluid resuscitation ordered per protocol  IV antibiotics as appropriate for source of sepsis  While organ dysfunction may be noted elsewhere in this problem list or in the chart, degree of organ dysfunction does not meet CMS criteria for severe sepsis  -afebrile  -lactic WNL  -blood cultures negative to date  -Sepsis has resolved        Tobacco dependence- (present on admission)  Assessment & Plan  -Nicotine replacement protocol         VTE prophylaxis: Enoxaparin            "

## 2020-08-18 PROCEDURE — A9270 NON-COVERED ITEM OR SERVICE: HCPCS | Performed by: INTERNAL MEDICINE

## 2020-08-18 PROCEDURE — A9270 NON-COVERED ITEM OR SERVICE: HCPCS | Performed by: NURSE PRACTITIONER

## 2020-08-18 PROCEDURE — A9270 NON-COVERED ITEM OR SERVICE: HCPCS | Performed by: HOSPITALIST

## 2020-08-18 PROCEDURE — 770021 HCHG ROOM/CARE - ISO PRIVATE

## 2020-08-18 PROCEDURE — 99231 SBSQ HOSP IP/OBS SF/LOW 25: CPT | Performed by: INTERNAL MEDICINE

## 2020-08-18 PROCEDURE — 700102 HCHG RX REV CODE 250 W/ 637 OVERRIDE(OP): Performed by: NURSE PRACTITIONER

## 2020-08-18 PROCEDURE — 700102 HCHG RX REV CODE 250 W/ 637 OVERRIDE(OP): Performed by: HOSPITALIST

## 2020-08-18 PROCEDURE — 700102 HCHG RX REV CODE 250 W/ 637 OVERRIDE(OP): Performed by: INTERNAL MEDICINE

## 2020-08-18 RX ADMIN — DIVALPROEX SODIUM 125 MG: 125 CAPSULE ORAL at 13:36

## 2020-08-18 RX ADMIN — Medication 400 MG: at 17:24

## 2020-08-18 RX ADMIN — AMLODIPINE BESYLATE 5 MG: 5 TABLET ORAL at 05:38

## 2020-08-18 RX ADMIN — RISPERIDONE 0.5 MG: 0.5 TABLET ORAL at 17:24

## 2020-08-18 RX ADMIN — OXYCODONE HYDROCHLORIDE 5 MG: 5 TABLET ORAL at 09:16

## 2020-08-18 RX ADMIN — DIVALPROEX SODIUM 125 MG: 125 CAPSULE ORAL at 05:38

## 2020-08-18 RX ADMIN — GABAPENTIN 100 MG: 100 CAPSULE ORAL at 17:24

## 2020-08-18 RX ADMIN — GABAPENTIN 100 MG: 100 CAPSULE ORAL at 11:53

## 2020-08-18 RX ADMIN — DOCUSATE SODIUM 50 MG AND SENNOSIDES 8.6 MG 2 TABLET: 8.6; 5 TABLET, FILM COATED ORAL at 05:38

## 2020-08-18 RX ADMIN — DOCUSATE SODIUM 50 MG AND SENNOSIDES 8.6 MG 2 TABLET: 8.6; 5 TABLET, FILM COATED ORAL at 17:29

## 2020-08-18 RX ADMIN — AMOXICILLIN AND CLAVULANATE POTASSIUM 1 TABLET: 875; 125 TABLET, FILM COATED ORAL at 05:38

## 2020-08-18 RX ADMIN — GABAPENTIN 100 MG: 100 CAPSULE ORAL at 05:38

## 2020-08-18 RX ADMIN — OXYCODONE HYDROCHLORIDE 5 MG: 5 TABLET ORAL at 05:38

## 2020-08-18 RX ADMIN — DIVALPROEX SODIUM 125 MG: 125 CAPSULE ORAL at 21:37

## 2020-08-18 RX ADMIN — OXYCODONE HYDROCHLORIDE 5 MG: 5 TABLET ORAL at 17:29

## 2020-08-18 RX ADMIN — OXYCODONE HYDROCHLORIDE 5 MG: 5 TABLET ORAL at 21:37

## 2020-08-18 RX ADMIN — AMOXICILLIN AND CLAVULANATE POTASSIUM 1 TABLET: 875; 125 TABLET, FILM COATED ORAL at 17:23

## 2020-08-18 RX ADMIN — RISPERIDONE 0.5 MG: 0.5 TABLET ORAL at 05:38

## 2020-08-18 RX ADMIN — Medication 400 MG: at 05:38

## 2020-08-18 ASSESSMENT — FIBROSIS 4 INDEX: FIB4 SCORE: 0.66

## 2020-08-18 NOTE — CARE PLAN
Problem: Safety  Goal: Will remain free from falls  Outcome: PROGRESSING AS EXPECTED     Problem: Infection  Goal: Will remain free from infection  Outcome: PROGRESSING AS EXPECTED     Problem: Fluid Volume:  Goal: Will maintain balanced intake and output  Outcome: PROGRESSING AS EXPECTED     Problem: Pain Management  Goal: Pain level will decrease to patient's comfort goal  Outcome: PROGRESSING AS EXPECTED     Problem: Psychosocial Needs:  Goal: Level of anxiety will decrease  Outcome: PROGRESSING AS EXPECTED     Problem: Mobility  Goal: Risk for activity intolerance will decrease  Outcome: PROGRESSING AS EXPECTED

## 2020-08-18 NOTE — PROGRESS NOTES
Hospital Medicine Daily Progress Note    Date of Service  8/18/2020    Chief Complaint  LLE wound    Hospital Course   Mr. Varela is a 64-year-old male with PMH significant for homelessness, etoh use, tobacco dependence and chronic LLE wound who presented 8/7/2020 with LLE wound infection.  He was hospitalized at our facility in April and evaluated by orthopedic surgery who recommended wound care.  Wound cultures at that time grew MSSA and strep.  Patient reported losing his Medicaid card and having no transportation to wound clinic.  He was seen at [facility] earlier this week and prescribed ABX, however he has not filled the prescription.  US LLE negative for DVT.  Treated for sepsis with empiric ABX and wound care.  He was found to have head lice and underwent treatments. Continued to have intermittent agitation so was started on risperdol, depakote and gabapentin per psych recs.         Interval Problem Update  No acute events. Pt appears calm and pleasant. Wound appears to be improving. Continue augmentin. Guardianship pending.    Consultants/Specialty  Psychiatry    Code Status  Full Code    Disposition  Will need guardianship and placement    Review of Systems  Review of Systems   Unable to perform ROS: Psychiatric disorder        Physical Exam  Temp:  [36.3 °C (97.3 °F)-36.7 °C (98.1 °F)] 36.7 °C (98.1 °F)  Pulse:  [76-93] 84  Resp:  [16-20] 20  BP: (131-150)/(85-97) 150/87  SpO2:  [95 %-98 %] 96 %    Physical Exam  Vitals signs and nursing note reviewed. Exam conducted with a chaperone present.   Constitutional:       General: He is awake. He is not in acute distress.     Appearance: He is underweight. He is not toxic-appearing or diaphoretic.   HENT:      Head: Normocephalic and atraumatic.      Nose: Nose normal.      Mouth/Throat:      Lips: Pink.      Mouth: Mucous membranes are moist.   Eyes:      General: No scleral icterus.     Conjunctiva/sclera: Conjunctivae normal.   Neck:       Musculoskeletal: Normal range of motion.   Cardiovascular:      Rate and Rhythm: Normal rate.   Pulmonary:      Effort: Pulmonary effort is normal.   Abdominal:      General: There is no distension.      Tenderness: There is no abdominal tenderness.   Musculoskeletal:      Right lower leg: No edema.      Left lower leg: No edema.   Skin:     General: Skin is warm and dry.      Comments: Left leg wounds without surrounding erythema or drainage.     Neurological:      Mental Status: He is alert.      Gait: Gait normal.      Comments: Oriented x 2 (person place, thought it was 2008)  Confused intermittently   Psychiatric:         Mood and Affect: Mood and affect normal.         Behavior: Behavior is not aggressive or combative. Behavior is cooperative.      Comments:            Fluids    Intake/Output Summary (Last 24 hours) at 8/18/2020 1024  Last data filed at 8/18/2020 1017  Gross per 24 hour   Intake 1600 ml   Output --   Net 1600 ml       Laboratory                        Imaging  CT-HEAD W/O   Final Result      No acute intracranial abnormality      DX-TIBIA AND FIBULA LEFT   Final Result      1.  Healed distal metaphyseal fractures of the left fibula and tibia with tibial IM layla in place.      2.  No acute fracture or dislocation.      3.  No radiopaque soft tissue foreign body.      DX-FEMUR-2+ LEFT   Final Result      1.  No radiographic evidence of acute traumatic injury left femur.      2.  Unchanged cortical thickening in the posterior aspect of the distal femoral diaphysis which may represent sequela of prior injury or infection.      3.  No soft tissue foreign body identified.      US-EXTREMITY VENOUS LOWER UNILAT LEFT   Final Result      DX-CHEST-PORTABLE (1 VIEW)   Final Result      No acute cardiopulmonary abnormality.           Assessment/Plan  Open wound of left lower leg- (present on admission)  Assessment & Plan  -chronic  -poor compliance with OP wound care. He can't remember the last time he had  his dressing changed OP  -wound care  -wound culture pending - Previous cultures grew MSSA and group A strep.  Preliminary wound cultures from this hospitalization also growing MSSA and group A strep  -blood cultures (-) to date  -afebrile. Leukocytosis resolved  -continue ABX unasyn 8/7-8/11 -->augmentin 8/11, end date 8/21  -LLE US (-) DVT    Hypomagnesemia  Assessment & Plan  -Likely related to EtOH abuse history  -Resolved    Hypokalemia- (present on admission)  Assessment & Plan  -Also with hypomagnesemia  -Resolved after replacement    Flexion contracture of knee, 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)  Assessment & Plan  -secondary to chronic wound in that area  -PT OT  -encourage mobility      Head lice  Assessment & Plan  Head lice found  S/p first round of treatment 8/10, treated again on 8/13 given active lice seen by CNA, will need second treatment 8/20  If still having issues will likely need to shave patient    Encephalopathy- (present on admission)  Assessment & Plan  -acute on chronic  -likely due to significant EtOH history.  Possible component of Warnicke's encephalopathy  -Psychiatry evaluated him and deemed him incapacitated to make medical decision or leave AMA and recommend SLP cognitive eval.  Patient will likely require guardianship and placement  -CT head unremarkable  -no acute neurological deficits  -Avoid narcotics and hypnotics.  Cautious use of benzodiazepines for alcohol withdrawal protocol  -Frequent reorientation  -Sleep hygiene      Non-compliance  Assessment & Plan  -likely also component of psychiatric disorder and ETOH abuse  -Psychiatric consulted and suggests patient is incapacitated to make medical decisions or leave AMA  -ongoing encouragement for compliance.    Psychiatric disorder  Assessment & Plan  -unknown/unspecified  -not currently on medication  -history of severe alcohol dependence.  -psychiatry consult today for agitation - incapacitated to leave AMA or make medical  "decisions, appreciate recs: risperdol 0.5mg BID, added depakote 125mg TID (8/15/20, watch LFTs), added neurotin 100mg TID 8/16      Alcohol dependence (HCC)- (present on admission)  Assessment & Plan  -history of.  Ongoing.  He reports drinking \"once in a while\"  -s/p WD, was on CIWA, uncomplicated  -MVI and thiamine    Protein malnutrition (HCC)  Assessment & Plan  -history of ETOH and homelessness  -dietary consult  -TID supplements  -encourage PO intake    Sepsis (HCC)- (present on admission)  Assessment & Plan  -This is Sepsis Present on admission  SIRS criteria identified on my evaluation include: Leukocytosis, with WBC greater than 12,000   Source is cellulitis and wound infection  Sepsis protocol initiated  Fluid resuscitation ordered per protocol  IV antibiotics as appropriate for source of sepsis  While organ dysfunction may be noted elsewhere in this problem list or in the chart, degree of organ dysfunction does not meet CMS criteria for severe sepsis  -afebrile  -lactic WNL  -blood cultures negative to date  -Sepsis has resolved        Tobacco dependence- (present on admission)  Assessment & Plan  -Nicotine replacement protocol         VTE prophylaxis: Enoxaparin            "

## 2020-08-18 NOTE — CARE PLAN
Problem: Pain Management  Goal: Pain level will decrease to patient's comfort goal  Outcome: PROGRESSING AS EXPECTED  Note: Reports of constant pain on L leg, medicated per MAR with some relief.  Non pharm comfort measure in place: food, distraction.     Problem: Psychosocial Needs:  Goal: Level of anxiety will decrease  Outcome: PROGRESSING AS EXPECTED  Note: Confused, impulsive and agitated but easily redirected.  Needs frequent distraction and reorientation.

## 2020-08-18 NOTE — CARE PLAN
Problem: Nutritional:  Goal: Achieve adequate nutritional intake  Description: Patient will consume 50% of meals  Outcome: MET     PO intake generally %. Requested new weight from RN via text. RD to follow weekly.

## 2020-08-19 PROCEDURE — A9270 NON-COVERED ITEM OR SERVICE: HCPCS | Performed by: INTERNAL MEDICINE

## 2020-08-19 PROCEDURE — A9270 NON-COVERED ITEM OR SERVICE: HCPCS | Performed by: NURSE PRACTITIONER

## 2020-08-19 PROCEDURE — 700102 HCHG RX REV CODE 250 W/ 637 OVERRIDE(OP): Performed by: NURSE PRACTITIONER

## 2020-08-19 PROCEDURE — 700102 HCHG RX REV CODE 250 W/ 637 OVERRIDE(OP): Performed by: HOSPITALIST

## 2020-08-19 PROCEDURE — 700102 HCHG RX REV CODE 250 W/ 637 OVERRIDE(OP): Performed by: INTERNAL MEDICINE

## 2020-08-19 PROCEDURE — A9270 NON-COVERED ITEM OR SERVICE: HCPCS | Performed by: HOSPITALIST

## 2020-08-19 PROCEDURE — 700111 HCHG RX REV CODE 636 W/ 250 OVERRIDE (IP): Performed by: HOSPITALIST

## 2020-08-19 PROCEDURE — 770021 HCHG ROOM/CARE - ISO PRIVATE

## 2020-08-19 PROCEDURE — 99231 SBSQ HOSP IP/OBS SF/LOW 25: CPT | Performed by: INTERNAL MEDICINE

## 2020-08-19 RX ADMIN — OXYCODONE HYDROCHLORIDE 5 MG: 5 TABLET ORAL at 15:17

## 2020-08-19 RX ADMIN — OXYCODONE HYDROCHLORIDE 5 MG: 5 TABLET ORAL at 05:37

## 2020-08-19 RX ADMIN — Medication 400 MG: at 05:37

## 2020-08-19 RX ADMIN — OXYCODONE HYDROCHLORIDE 5 MG: 5 TABLET ORAL at 20:06

## 2020-08-19 RX ADMIN — NICOTINE TRANSDERMAL SYSTEM 21 MG: 21 PATCH, EXTENDED RELEASE TRANSDERMAL at 05:38

## 2020-08-19 RX ADMIN — RISPERIDONE 0.5 MG: 0.5 TABLET ORAL at 05:37

## 2020-08-19 RX ADMIN — Medication 400 MG: at 16:51

## 2020-08-19 RX ADMIN — GABAPENTIN 100 MG: 100 CAPSULE ORAL at 16:50

## 2020-08-19 RX ADMIN — RISPERIDONE 0.5 MG: 0.5 TABLET ORAL at 16:51

## 2020-08-19 RX ADMIN — DIVALPROEX SODIUM 125 MG: 125 CAPSULE ORAL at 05:38

## 2020-08-19 RX ADMIN — GABAPENTIN 100 MG: 100 CAPSULE ORAL at 12:40

## 2020-08-19 RX ADMIN — AMOXICILLIN AND CLAVULANATE POTASSIUM 1 TABLET: 875; 125 TABLET, FILM COATED ORAL at 05:37

## 2020-08-19 RX ADMIN — DIVALPROEX SODIUM 125 MG: 125 CAPSULE ORAL at 20:06

## 2020-08-19 RX ADMIN — GABAPENTIN 100 MG: 100 CAPSULE ORAL at 05:37

## 2020-08-19 RX ADMIN — ENOXAPARIN SODIUM 40 MG: 40 INJECTION SUBCUTANEOUS at 05:38

## 2020-08-19 RX ADMIN — DOCUSATE SODIUM 50 MG AND SENNOSIDES 8.6 MG 2 TABLET: 8.6; 5 TABLET, FILM COATED ORAL at 05:37

## 2020-08-19 RX ADMIN — DIVALPROEX SODIUM 125 MG: 125 CAPSULE ORAL at 14:45

## 2020-08-19 RX ADMIN — DOCUSATE SODIUM 50 MG AND SENNOSIDES 8.6 MG 2 TABLET: 8.6; 5 TABLET, FILM COATED ORAL at 16:50

## 2020-08-19 RX ADMIN — AMLODIPINE BESYLATE 5 MG: 5 TABLET ORAL at 05:37

## 2020-08-19 RX ADMIN — AMOXICILLIN AND CLAVULANATE POTASSIUM 1 TABLET: 875; 125 TABLET, FILM COATED ORAL at 16:50

## 2020-08-19 NOTE — WOUND TEAM
[facility] Wound [facility] Ostomy [facility]  Inpatient Services  Wound and Skin Care Progress Note    Admission Date: 8/7/2020     Last order of IP CONSULT TO WOUND CARE was found on 8/7/2020 from Hospital Encounter on 8/7/2020     HPI, PMH, SH: Reviewed    Unit where seen by Wound Team: [id number]     WOUND CONSULT/FOLLOW UP RELATED TO:  Left leg chronic wound      Self Report / Pain Level:  Pt denies pain        OBJECTIVE:  Pt standing in room watching television.    WOUND TYPE, LOCATION, CHARACTERISTICS (Pressure Injuries: location, stage, POA or date identified)    Wound 08/07/20 Full Thickness Wound Leg LLE posterior, extends medially and laterally (Active)   Wound Image    08/19/20 1330   Site Assessment Red;Pink;Yellow 08/19/20 1330   Periwound Assessment Intact;Scar tissue;Mild irritation distally 08/19/20 1330   Margins Attached edges 08/19/20 1330   Closure Secondary intention 08/19/20 1330   Drainage Amount Moderate 08/19/20 1330   Drainage Description Serosanguineous 08/19/20 1330   Treatments Cleansed 08/19/20 1330   Wound Cleansing Approved Wound Cleanser 08/19/20 1330   Periwound Protectant Skin Protectant Wipes to Periwound 08/19/20 1330   Dressing Cleansing/Solutions Normal Saline 08/19/20 0544   Dressing Options Collagen Dressing;Calcium Alginate;Mepilex;Tubigrip size F 08/19/20 1330   Dressing Changed New 08/19/20 1330   Dressing Status Clean;Dry;Intact 08/19/20 1330   Dressing Change/Treatment Frequency Every 72 hrs, and As Needed 08/19/20 1330   NEXT Dressing Change/Treatment Date 08/22/20 08/19/20 1330   NEXT Weekly Photo (Inpatient Only) 08/26/20 08/19/20 1330   Non-staged Wound Description Full thickness 08/19/20 1330   Wound Length (cm) 34 cm 08/19/20 1330   Wound Width (cm) 15 cm 08/19/20 1330   Wound Depth (cm) 0.2 cm 08/19/20 1330   Wound Surface Area (cm^2) 510 cm^2 08/19/20 1330   Wound Volume (cm^3) 102 cm^3 08/19/20 1330   Wound Healing % -94 08/19/20 1330   Shape elongated 08/15/20 1600   Wound Odor  None 08/19/20 1330   Pulses Left;DP;PT;2+;Easily palpated manually 08/19/20 1330   Exposed Structures None 08/19/20 1330   WOUND NURSE ONLY - Time Spent with Patient (mins) 60 08/15/20 1600     Vascular:    KOSTAS:   No results found.    Lab Values:    Lab Results   Component Value Date/Time    WBC 6.6 08/09/2020 01:22 AM    RBC 3.48 (L) 08/09/2020 01:22 AM    HEMOGLOBIN 10.4 (L) 08/09/2020 01:22 AM    HEMATOCRIT 31.7 (L) 08/09/2020 01:22 AM    CREACTPROT 1.07 (H) 08/12/2020 01:55 PM    SEDRATEWES 85 (H) 08/07/2020 01:20 PM    HBA1C 5.7 (H) 11/09/2018 06:30 PM        Culture Results: 08/07/2020: Positive staph aureus and beta hemolytic strep.      INTERVENTIONS BY WOUND TEAM:  Prior dressing removed.  Site cleansed.  Vanessa applied to wound bed, covered with alginate, then sacral mepilex x2.  Tubigrip F to secure all.  Pt tolerated well.    Interdisciplinary consultation: Patient, Bedside RN    EVALUATION: 8/15/2020 - wound bed red, slighly shiny, appears to be responding to collagen dressing.  Pt has thick indurated edges that will delay healing.  This wound is also very chronic and cellular activity is clearly diminished, collagen should help encourage tissue growth.        Patient seen on previous admit for same wound.  Per pt, wound was obtained after a traumatic fall about 2 years ago.  Patient has been non compliant with follow up.  History of homelessness.    Goals: Steady decrease in wound area and depth weekly.    NURSING PLAN OF CARE ORDERS (X):    Dressing changes: See Dressing Care orders: X  Skin care: See Skin Care orders: moves self   Rectal tube care: See Rectal Tube Care orders:   Other orders:    WOUND TEAM PLAN OF CARE:   Dressing changes by wound team:       Follow up 3 times weekly:                NPWT change 3 times weekly:     Follow up 1-2 times weekly:    X (Dsng change 1x/wk by wound team on Wed.  Dsng change q72 hrs per bedside nursing.  Follow up Bi-Monthly:                   Follow up as  needed:       Other (explain):     Anticipated discharge plans: will need ongoing wound care post DC - history non compliant in past.  LTACH:        SNF/Rehab:                  Home Health Care:           Outpatient Wound Center:          Self Care:

## 2020-08-19 NOTE — PROGRESS NOTES
0530: Daily dressing change done per wound care order on LLE. Pt tolerated very well. Next dressing change due 8/20. Next lice treatment due 8/20.    Joao Carvalho RN.

## 2020-08-19 NOTE — PROGRESS NOTES
"Hospital Medicine Daily Progress Note    Date of Service  8/19/2020    Chief Complaint  LLE wound    Hospital Course   Mr. Varela is a 64-year-old male with PMH significant for homelessness, etoh use, tobacco dependence and chronic LLE wound who presented 8/7/2020 with LLE wound infection.  He was hospitalized at our facility in April and evaluated by orthopedic surgery who recommended wound care.  Wound cultures at that time grew MSSA and strep.  Patient reported losing his Medicaid card and having no transportation to wound clinic.  He was seen at [facility] earlier this week and prescribed ABX, however he has not filled the prescription.  US LLE negative for DVT.  Treated for sepsis with empiric ABX and wound care.  He was found to have head lice and underwent treatments. Continued to have intermittent agitation so was started on risperdol, depakote and gabapentin per psych recs.  He has been deemed incapacitated to make medical decisions and is currently pending guardianship and placement.  He is medically stable and will be only be evaluated 2 times weekly unless there is a clinical change.          Interval Problem Update  8/19:  Patient walking around room.  He is hyperverbal.  He is continuously requesting to get \"$150\" per week of his social security check.  Does not appear to be in acute pain or discomfort.     Consultants/Specialty  Psychiatry    Code Status  Full Code    Disposition  Will need guardianship and placement    Review of Systems  Review of Systems   Unable to perform ROS: Psychiatric disorder        Physical Exam  Temp:  [36.6 °C (97.9 °F)-36.9 °C (98.4 °F)] 36.8 °C (98.3 °F)  Pulse:  [77-97] 97  Resp:  [16-19] 17  BP: (121-154)/(84-99) 121/99  SpO2:  [96 %-100 %] 100 %    Physical Exam  Vitals signs and nursing note reviewed. Exam conducted with a chaperone present.   Constitutional:       General: He is awake. He is not in acute distress.     Appearance: He is underweight. He is " not toxic-appearing or diaphoretic.   HENT:      Head: Normocephalic and atraumatic.      Nose: Nose normal.      Mouth/Throat:      Lips: Pink.      Mouth: Mucous membranes are moist.   Eyes:      General: No scleral icterus.     Conjunctiva/sclera: Conjunctivae normal.   Neck:      Musculoskeletal: Normal range of motion.   Cardiovascular:      Rate and Rhythm: Normal rate.   Pulmonary:      Effort: Pulmonary effort is normal.   Abdominal:      General: There is no distension.      Tenderness: There is no abdominal tenderness.   Musculoskeletal:      Right lower leg: No edema.      Left lower leg: No edema.   Skin:     General: Skin is warm and dry.      Comments: Left leg wounds without surrounding erythema or drainage.     Neurological:      Mental Status: He is alert.      Gait: Gait normal.      Comments: Oriented x 2 (person place, thought it was 2008)  Confused intermittently   Psychiatric:         Mood and Affect: Mood and affect normal.         Behavior: Behavior is not aggressive or combative. Behavior is cooperative.      Comments:        No change in PE since prior exam.     Fluids    Intake/Output Summary (Last 24 hours) at 8/19/2020 0919  Last data filed at 8/19/2020 0800  Gross per 24 hour   Intake 1800 ml   Output --   Net 1800 ml       Laboratory                        Imaging  CT-HEAD W/O   Final Result      No acute intracranial abnormality      DX-TIBIA AND FIBULA LEFT   Final Result      1.  Healed distal metaphyseal fractures of the left fibula and tibia with tibial IM layla in place.      2.  No acute fracture or dislocation.      3.  No radiopaque soft tissue foreign body.      DX-FEMUR-2+ LEFT   Final Result      1.  No radiographic evidence of acute traumatic injury left femur.      2.  Unchanged cortical thickening in the posterior aspect of the distal femoral diaphysis which may represent sequela of prior injury or infection.      3.  No soft tissue foreign body identified.       US-EXTREMITY VENOUS LOWER UNILAT LEFT   Final Result      DX-CHEST-PORTABLE (1 VIEW)   Final Result      No acute cardiopulmonary abnormality.           Assessment/Plan  Open wound of left lower leg- (present on admission)  Assessment & Plan  -chronic  -poor compliance with OP wound care. He can't remember the last time he had his dressing changed OP  -wound care  -Previous cultures grew MSSA and group A strep.  Wound cultures from this hospitalization also growing MSSA and group A strep  -blood cultures (-) to date  -afebrile. Leukocytosis resolved  -continue ABX unasyn 8/7-8/11 -->augmentin 8/11, end date 8/21  -LLE US (-) DVT    Hypomagnesemia  Assessment & Plan  -Likely related to EtOH abuse history  -Resolved    Hypokalemia- (present on admission)  Assessment & Plan  -Also with hypomagnesemia  -Resolved after replacement    Flexion contracture of knee, 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)  Assessment & Plan  -secondary to chronic wound in that area  -PT OT  -encourage mobility      Head lice  Assessment & Plan  Head lice found  S/p first round of treatment 8/10, treated again on 8/13 given active lice seen by CNA, will need second treatment 8/20  If still having issues will likely need to shave patient    Encephalopathy- (present on admission)  Assessment & Plan  -acute on chronic  -likely due to significant EtOH history.  Possible component of Warnicke's encephalopathy  -Psychiatry evaluated him and deemed him incapacitated to make medical decision or leave AMA and recommend SLP cognitive eval.  Patient will likely require guardianship and placement  -CT head unremarkable  -no acute neurological deficits  -Avoid narcotics and hypnotics.  Cautious use of benzodiazepines for alcohol withdrawal protocol  -Frequent reorientation  -Sleep hygiene      Non-compliance  Assessment & Plan  -likely also component of psychiatric disorder and ETOH abuse  -Psychiatric consulted and suggests patient is incapacitated to make  "medical decisions or leave AMA  -ongoing encouragement for compliance.    Psychiatric disorder  Assessment & Plan  -unknown/unspecified  -not currently on medication  -history of severe alcohol dependence.  -psychiatry consult today for agitation - incapacitated to leave AMA or make medical decisions, appreciate recs: risperdol 0.5mg BID, added depakote 125mg TID (8/15/20, watch LFTs), added neurotin 100mg TID 8/16  -Pending guardianship.       Alcohol dependence (HCC)- (present on admission)  Assessment & Plan  -history of.  Ongoing.  He reports drinking \"once in a while\"  -s/p WD, was on CIWA, uncomplicated  -MVI and thiamine    Protein malnutrition (HCC)  Assessment & Plan  -history of ETOH and homelessness  -dietary consult  -TID supplements  -encourage PO intake    Sepsis (HCC)- (present on admission)  Assessment & Plan  -This is Sepsis Present on admission  SIRS criteria identified on my evaluation include: Leukocytosis, with WBC greater than 12,000   Source is cellulitis and wound infection  Sepsis protocol initiated  Fluid resuscitation ordered per protocol  IV antibiotics as appropriate for source of sepsis  While organ dysfunction may be noted elsewhere in this problem list or in the chart, degree of organ dysfunction does not meet CMS criteria for severe sepsis  -afebrile  -lactic WNL  -blood cultures negative to date  -Sepsis has resolved        Tobacco dependence- (present on admission)  Assessment & Plan  -Nicotine replacement protocol         VTE prophylaxis: Enoxaparin            "

## 2020-08-19 NOTE — CARE PLAN
Problem: Safety  Goal: Will remain free from falls  Outcome: PROGRESSING AS EXPECTED     Problem: Infection  Goal: Will remain free from infection  Outcome: PROGRESSING AS EXPECTED     Problem: Venous Thromboembolism (VTW)/Deep Vein Thrombosis (DVT) Prevention:  Goal: Patient will participate in Venous Thrombosis (VTE)/Deep Vein Thrombosis (DVT)Prevention Measures  Outcome: PROGRESSING AS EXPECTED     Problem: Bowel/Gastric:  Goal: Normal bowel function is maintained or improved  Outcome: PROGRESSING AS EXPECTED  Goal: Will not experience complications related to bowel motility  Outcome: PROGRESSING AS EXPECTED     Problem: Pain Management  Goal: Pain level will decrease to patient's comfort goal  Outcome: PROGRESSING AS EXPECTED

## 2020-08-20 PROCEDURE — A9270 NON-COVERED ITEM OR SERVICE: HCPCS | Performed by: INTERNAL MEDICINE

## 2020-08-20 PROCEDURE — 700102 HCHG RX REV CODE 250 W/ 637 OVERRIDE(OP): Performed by: INTERNAL MEDICINE

## 2020-08-20 PROCEDURE — 770021 HCHG ROOM/CARE - ISO PRIVATE

## 2020-08-20 PROCEDURE — 700102 HCHG RX REV CODE 250 W/ 637 OVERRIDE(OP): Performed by: HOSPITALIST

## 2020-08-20 PROCEDURE — 700102 HCHG RX REV CODE 250 W/ 637 OVERRIDE(OP): Performed by: NURSE PRACTITIONER

## 2020-08-20 PROCEDURE — A9270 NON-COVERED ITEM OR SERVICE: HCPCS | Performed by: HOSPITALIST

## 2020-08-20 PROCEDURE — A9270 NON-COVERED ITEM OR SERVICE: HCPCS | Performed by: NURSE PRACTITIONER

## 2020-08-20 PROCEDURE — 700111 HCHG RX REV CODE 636 W/ 250 OVERRIDE (IP): Performed by: HOSPITALIST

## 2020-08-20 PROCEDURE — 92526 ORAL FUNCTION THERAPY: CPT

## 2020-08-20 RX ADMIN — OXYCODONE HYDROCHLORIDE 5 MG: 5 TABLET ORAL at 17:11

## 2020-08-20 RX ADMIN — RISPERIDONE 0.5 MG: 0.5 TABLET ORAL at 05:09

## 2020-08-20 RX ADMIN — AMLODIPINE BESYLATE 5 MG: 5 TABLET ORAL at 05:09

## 2020-08-20 RX ADMIN — DOCUSATE SODIUM 50 MG AND SENNOSIDES 8.6 MG 2 TABLET: 8.6; 5 TABLET, FILM COATED ORAL at 05:09

## 2020-08-20 RX ADMIN — GABAPENTIN 100 MG: 100 CAPSULE ORAL at 12:17

## 2020-08-20 RX ADMIN — DIVALPROEX SODIUM 125 MG: 125 CAPSULE ORAL at 05:09

## 2020-08-20 RX ADMIN — AMOXICILLIN AND CLAVULANATE POTASSIUM 1 TABLET: 875; 125 TABLET, FILM COATED ORAL at 05:09

## 2020-08-20 RX ADMIN — GABAPENTIN 100 MG: 100 CAPSULE ORAL at 17:11

## 2020-08-20 RX ADMIN — DIVALPROEX SODIUM 125 MG: 125 CAPSULE ORAL at 21:00

## 2020-08-20 RX ADMIN — RISPERIDONE 0.5 MG: 0.5 TABLET ORAL at 17:11

## 2020-08-20 RX ADMIN — ENOXAPARIN SODIUM 40 MG: 40 INJECTION SUBCUTANEOUS at 05:08

## 2020-08-20 RX ADMIN — AMOXICILLIN AND CLAVULANATE POTASSIUM 1 TABLET: 875; 125 TABLET, FILM COATED ORAL at 17:10

## 2020-08-20 RX ADMIN — OXYCODONE HYDROCHLORIDE 5 MG: 5 TABLET ORAL at 05:09

## 2020-08-20 RX ADMIN — OXYCODONE HYDROCHLORIDE 5 MG: 5 TABLET ORAL at 21:00

## 2020-08-20 RX ADMIN — GABAPENTIN 100 MG: 100 CAPSULE ORAL at 05:09

## 2020-08-20 RX ADMIN — Medication 400 MG: at 17:11

## 2020-08-20 RX ADMIN — DIVALPROEX SODIUM 125 MG: 125 CAPSULE ORAL at 15:12

## 2020-08-20 RX ADMIN — DOCUSATE SODIUM 50 MG AND SENNOSIDES 8.6 MG 2 TABLET: 8.6; 5 TABLET, FILM COATED ORAL at 17:10

## 2020-08-20 RX ADMIN — Medication 400 MG: at 05:09

## 2020-08-20 NOTE — CARE PLAN
Problem: Infection  Goal: Will remain free from infection  Outcome: PROGRESSING AS EXPECTED  Intervention: Assess signs and symptoms of infection  Note: Oral abx in use.      Problem: Venous Thromboembolism (VTW)/Deep Vein Thrombosis (DVT) Prevention:  Goal: Patient will participate in Venous Thrombosis (VTE)/Deep Vein Thrombosis (DVT)Prevention Measures  Outcome: PROGRESSING AS EXPECTED  Intervention: Ensure patient wears graduated elastic stockings (EDA hose) and/or SCDs, if ordered, when in bed or chair (Remove at least once per shift for skin check)  Note: Pt ambulates frequently, subq lovenox in use.

## 2020-08-20 NOTE — THERAPY
Speech Language Pathology  Daily Treatment     Patient Name: Bola Varela  Age:  64 y.o., Sex:  male  Medical Record #: [id number]  Today's Date: 8/20/2020     Precautions: (P) Fall Risk, Swallow Precautions ( See Comments)  Comments: poor short term memory and problem solving    Assessment    Patient was seen on this date for dysphagia treatment with a MM5/TN0 meal tray. Patient walking around room and sat in chair for session. Pt remains verbose during oral prep which resulted in cough x1 each with purees and minced meat. Patient able to follow verbal and gestured cues to eliminate talking. Delayed increase in mild wet vocal that was cleared with cued cough. No overt s/sx of aspiration with 12 oz thin liquids from cup and straw.     Plan    Continue minced and moist textures (level 5) and thin liquids (level 0) with limited distractions and cues for swallow strategies.     Continue current treatment plan.    Discharge Recommendations: (P) Recommend home health for continued speech therapy services       Objective       08/20/20 1215   Vitals   O2 Delivery Device None - Room Air   Dysphagia    Positioning / Behavior Modification Modulate Rate or Bite Size;Self Monitoring   Other Treatments MM5/TN0 meal tray   Diet / Liquid Recommendation Minced & Moist (5) - (Dysphagia II);Thin (0)   Skilled Intervention Compensatory Strategies;Verbal Cueing   Recommended Route of Medication Administration   Medication Administration  Float Whole with Puree   Short Term Goals   Short Term Goal # 2 REVISED 8/20: The patient will consume MM5/TN0 without signs of aspiration given moderate cues for swallow precautions   Goal Outcome # 2  Progressing as expected

## 2020-08-20 NOTE — PROGRESS NOTES
1:1 safety sitter - Patient pleasant and cooperative all shift. He did not make any attempts to elope, ambulated in his room independantly and was easily redirectable when needed.

## 2020-08-20 NOTE — CARE PLAN
Problem: Safety  Goal: Will remain free from falls  Outcome: PROGRESSING AS EXPECTED     Problem: Venous Thromboembolism (VTW)/Deep Vein Thrombosis (DVT) Prevention:  Goal: Patient will participate in Venous Thrombosis (VTE)/Deep Vein Thrombosis (DVT)Prevention Measures  Outcome: PROGRESSING AS EXPECTED     Problem: Knowledge Deficit  Goal: Knowledge of the prescribed therapeutic regimen will improve  Outcome: PROGRESSING AS EXPECTED

## 2020-08-21 PROCEDURE — 770021 HCHG ROOM/CARE - ISO PRIVATE

## 2020-08-21 PROCEDURE — 700111 HCHG RX REV CODE 636 W/ 250 OVERRIDE (IP): Performed by: HOSPITALIST

## 2020-08-21 PROCEDURE — 700102 HCHG RX REV CODE 250 W/ 637 OVERRIDE(OP): Performed by: INTERNAL MEDICINE

## 2020-08-21 PROCEDURE — A9270 NON-COVERED ITEM OR SERVICE: HCPCS | Performed by: HOSPITALIST

## 2020-08-21 PROCEDURE — A9270 NON-COVERED ITEM OR SERVICE: HCPCS | Performed by: INTERNAL MEDICINE

## 2020-08-21 PROCEDURE — 700102 HCHG RX REV CODE 250 W/ 637 OVERRIDE(OP): Performed by: HOSPITALIST

## 2020-08-21 PROCEDURE — A9270 NON-COVERED ITEM OR SERVICE: HCPCS | Performed by: NURSE PRACTITIONER

## 2020-08-21 PROCEDURE — 700102 HCHG RX REV CODE 250 W/ 637 OVERRIDE(OP): Performed by: NURSE PRACTITIONER

## 2020-08-21 RX ADMIN — Medication 400 MG: at 18:30

## 2020-08-21 RX ADMIN — AMLODIPINE BESYLATE 5 MG: 5 TABLET ORAL at 04:13

## 2020-08-21 RX ADMIN — DIVALPROEX SODIUM 125 MG: 125 CAPSULE ORAL at 13:01

## 2020-08-21 RX ADMIN — DOCUSATE SODIUM 50 MG AND SENNOSIDES 8.6 MG 2 TABLET: 8.6; 5 TABLET, FILM COATED ORAL at 18:30

## 2020-08-21 RX ADMIN — AMOXICILLIN AND CLAVULANATE POTASSIUM 1 TABLET: 875; 125 TABLET, FILM COATED ORAL at 04:11

## 2020-08-21 RX ADMIN — OXYCODONE HYDROCHLORIDE 5 MG: 5 TABLET ORAL at 10:38

## 2020-08-21 RX ADMIN — OXYCODONE HYDROCHLORIDE 5 MG: 5 TABLET ORAL at 04:11

## 2020-08-21 RX ADMIN — GABAPENTIN 100 MG: 100 CAPSULE ORAL at 13:01

## 2020-08-21 RX ADMIN — RISPERIDONE 0.5 MG: 0.5 TABLET ORAL at 18:30

## 2020-08-21 RX ADMIN — RISPERIDONE 0.5 MG: 0.5 TABLET ORAL at 04:11

## 2020-08-21 RX ADMIN — Medication 400 MG: at 04:11

## 2020-08-21 RX ADMIN — DIVALPROEX SODIUM 125 MG: 125 CAPSULE ORAL at 21:38

## 2020-08-21 RX ADMIN — ENOXAPARIN SODIUM 40 MG: 40 INJECTION SUBCUTANEOUS at 04:11

## 2020-08-21 RX ADMIN — ACETAMINOPHEN 650 MG: 325 TABLET, FILM COATED ORAL at 18:29

## 2020-08-21 RX ADMIN — OXYCODONE HYDROCHLORIDE 5 MG: 5 TABLET ORAL at 18:30

## 2020-08-21 RX ADMIN — DOCUSATE SODIUM 50 MG AND SENNOSIDES 8.6 MG 2 TABLET: 8.6; 5 TABLET, FILM COATED ORAL at 04:11

## 2020-08-21 RX ADMIN — CLONIDINE HYDROCHLORIDE 0.1 MG: 0.1 TABLET ORAL at 18:30

## 2020-08-21 RX ADMIN — OXYCODONE HYDROCHLORIDE 5 MG: 5 TABLET ORAL at 21:38

## 2020-08-21 RX ADMIN — GABAPENTIN 100 MG: 100 CAPSULE ORAL at 04:11

## 2020-08-21 RX ADMIN — GABAPENTIN 100 MG: 100 CAPSULE ORAL at 18:30

## 2020-08-21 RX ADMIN — PIPERONYL BUTOXIDE, PYRETHRUM EXTRACT: 4; .33 SHAMPOO TOPICAL at 10:38

## 2020-08-21 RX ADMIN — AMOXICILLIN AND CLAVULANATE POTASSIUM 1 TABLET: 875; 125 TABLET, FILM COATED ORAL at 18:29

## 2020-08-21 RX ADMIN — DIVALPROEX SODIUM 125 MG: 125 CAPSULE ORAL at 04:11

## 2020-08-21 NOTE — CARE PLAN
Problem: Pain Management  Goal: Pain level will decrease to patient's comfort goal  Outcome: PROGRESSING AS EXPECTED  Intervention: Educate and implement non-pharmacologic comfort measures. Examples: relaxation, distration, play therapy, activity therapy, massage, etc.  Note: Pt relaxing comfortably in bed watching baseball, PRN pain medication given. No other needs at this time.      Problem: Mobility  Goal: Risk for activity intolerance will decrease  Outcome: PROGRESSING AS EXPECTED  Intervention: Encourage patient to increase activity level in collaboration with Interdisciplinary Team  Note: Pt ambulates independently and frequently.

## 2020-08-21 NOTE — PROGRESS NOTES
"Hospital Medicine Twice Weekly Progress Note    Date of Service  8/21/2020    Chief Complaint  64 y.o. male admitted 8/7/2020 with LLE wound    Hospital Course    Mr. Varela is a 64-year-old male with a PMHx of homelessness, EtOH use, tobacco dependence, and chronic LLE wound who presented on 8/7/2020 due to wound infection.  Patient was recently hospitalized in our facility in April 2020 and was evaluated by orthopedic surgery and recommended wound care.  Wound cultures at that time grew MSSA and strep.  Patient reports losing his Medicaid card and having no transportation to wound clinic.  Patient was seen by [facility] earlier this week and was prescribed antibiotics, however, he did not filled the prescription.  Ultrasound of the left lower extremity negative for DVT.  Patient being treated for sepsis with empiric ABX and wound care.  Patient was also found to have head lice and underwent treatments.  Patient continued to have intermittent agitation and was started on Risperdal, Depakote, and gabapentin per psychiatry recommendations.  Patient deemed incapacitated to make medical decisions and incapacitated to leave AMA by psychiatry on 8/9/2020.  Pursuing or guardianship in place.        Interval Problem Update  -Patient seen and examined.  Patient sitting side of the bed and wants to discuss \"everything \".  Patient continuously asking where his check went and if any of our staff can check on that.  Patient denies any pain or discomfort at this time.  Patient redirected and notified in plan of care.  -POC: Continue to provide supportive care, monitor for safety; pending guardianship and placement.  -Lab work: Last obtained on 8/15/2020, reviewed; unremarkable.  Will order lab as warranted.  -VSS at this time    Consultants/Specialty  Psychiatry    Code Status  Full Code    Disposition  TBD -pending guardianship and placement    Assessment/Plan  Open wound of left lower leg- (present on " admission)  Assessment & Plan  -chronic  -poor compliance with OP wound care. He can't remember the last time he had his dressing changed OP  -wound care  -Previous cultures grew MSSA and group A strep.  Wound cultures from this hospitalization also growing MSSA and group A strep  -blood cultures (-) to date  -afebrile. Leukocytosis resolved  -continue ABX unasyn 8/7-8/11 -->augmentin 8/11, end date 8/21  -LLE US (-) DVT    Hypomagnesemia  Assessment & Plan  -Likely related to EtOH abuse history  -Resolved    Hypokalemia- (present on admission)  Assessment & Plan  -Also with hypomagnesemia  -Resolved after replacement    Flexion contracture of knee, 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)  Assessment & Plan  -secondary to chronic wound in that area  -PT OT  -encourage mobility      Head lice  Assessment & Plan  Head lice found  S/p first round of treatment 8/10, treated again on 8/13 given active lice seen by CNA, will need second treatment 8/20  If still having issues will likely need to shave patient    Encephalopathy- (present on admission)  Assessment & Plan  -acute on chronic  -likely due to significant EtOH history.  Possible component of Warnicke's encephalopathy  -Psychiatry evaluated him and deemed him incapacitated to make medical decision or leave AMA and recommend SLP cognitive eval.  Patient will likely require guardianship and placement  -CT head unremarkable  -no acute neurological deficits  -Avoid narcotics and hypnotics.  Cautious use of benzodiazepines for alcohol withdrawal protocol  -Frequent reorientation  -Sleep hygiene      Non-compliance  Assessment & Plan  -likely also component of psychiatric disorder and ETOH abuse  -Psychiatric consulted and suggests patient is incapacitated to make medical decisions or leave AMA  -ongoing encouragement for compliance.    Psychiatric disorder  Assessment & Plan  -unknown/unspecified  -not currently on medication  -history of severe alcohol dependence.  -psychiatry  "consult today for agitation - incapacitated to leave AMA or make medical decisions, appreciate recs: risperdol 0.5mg BID, added depakote 125mg TID (8/15/20, watch LFTs), added neurotin 100mg TID 8/16  -Pending guardianship.       Alcohol dependence (HCC)- (present on admission)  Assessment & Plan  -history of.  Ongoing.  He reports drinking \"once in a while\"  -s/p WD, was on CIWA, uncomplicated  -MVI and thiamine    Protein malnutrition (HCC)  Assessment & Plan  -history of ETOH and homelessness  -dietary consult  -TID supplements  -encourage PO intake    Sepsis (HCC)- (present on admission)  Assessment & Plan  -This is Sepsis Present on admission  SIRS criteria identified on my evaluation include: Leukocytosis, with WBC greater than 12,000   Source is cellulitis and wound infection  Sepsis protocol initiated  Fluid resuscitation ordered per protocol  IV antibiotics as appropriate for source of sepsis  While organ dysfunction may be noted elsewhere in this problem list or in the chart, degree of organ dysfunction does not meet CMS criteria for severe sepsis  -afebrile  -lactic WNL  -blood cultures negative to date  -Sepsis has resolved        Tobacco dependence- (present on admission)  Assessment & Plan  -Nicotine replacement protocol         ============================================================================================  Please note that this dictation was created using voice recognition software. I have made every reasonable attempt to correct obvious errors, but there may be errors of grammar and possibly content that I did not discover before finalizing the note.    Electronically signed by:  BARBARA Méndez, MSN, APRN, FNP-C  Hospitalist Services  [facility]  [phone]  [email or web address]  08/21/20     1052      "

## 2020-08-22 PROCEDURE — 700111 HCHG RX REV CODE 636 W/ 250 OVERRIDE (IP): Performed by: HOSPITALIST

## 2020-08-22 PROCEDURE — A9270 NON-COVERED ITEM OR SERVICE: HCPCS | Performed by: HOSPITALIST

## 2020-08-22 PROCEDURE — A9270 NON-COVERED ITEM OR SERVICE: HCPCS | Performed by: NURSE PRACTITIONER

## 2020-08-22 PROCEDURE — A9270 NON-COVERED ITEM OR SERVICE: HCPCS | Performed by: INTERNAL MEDICINE

## 2020-08-22 PROCEDURE — 770021 HCHG ROOM/CARE - ISO PRIVATE

## 2020-08-22 PROCEDURE — 700102 HCHG RX REV CODE 250 W/ 637 OVERRIDE(OP): Performed by: HOSPITALIST

## 2020-08-22 PROCEDURE — 700102 HCHG RX REV CODE 250 W/ 637 OVERRIDE(OP): Performed by: INTERNAL MEDICINE

## 2020-08-22 PROCEDURE — 700102 HCHG RX REV CODE 250 W/ 637 OVERRIDE(OP): Performed by: NURSE PRACTITIONER

## 2020-08-22 RX ADMIN — DIVALPROEX SODIUM 125 MG: 125 CAPSULE ORAL at 13:25

## 2020-08-22 RX ADMIN — DOCUSATE SODIUM 50 MG AND SENNOSIDES 8.6 MG 2 TABLET: 8.6; 5 TABLET, FILM COATED ORAL at 05:23

## 2020-08-22 RX ADMIN — Medication 400 MG: at 05:23

## 2020-08-22 RX ADMIN — OXYCODONE HYDROCHLORIDE 5 MG: 5 TABLET ORAL at 17:28

## 2020-08-22 RX ADMIN — GABAPENTIN 100 MG: 100 CAPSULE ORAL at 17:28

## 2020-08-22 RX ADMIN — DIVALPROEX SODIUM 125 MG: 125 CAPSULE ORAL at 05:23

## 2020-08-22 RX ADMIN — DIVALPROEX SODIUM 125 MG: 125 CAPSULE ORAL at 20:44

## 2020-08-22 RX ADMIN — OXYCODONE HYDROCHLORIDE 5 MG: 5 TABLET ORAL at 11:36

## 2020-08-22 RX ADMIN — GABAPENTIN 100 MG: 100 CAPSULE ORAL at 05:24

## 2020-08-22 RX ADMIN — AMLODIPINE BESYLATE 5 MG: 5 TABLET ORAL at 05:24

## 2020-08-22 RX ADMIN — GABAPENTIN 100 MG: 100 CAPSULE ORAL at 11:36

## 2020-08-22 RX ADMIN — RISPERIDONE 0.5 MG: 0.5 TABLET ORAL at 05:24

## 2020-08-22 RX ADMIN — Medication 400 MG: at 17:28

## 2020-08-22 RX ADMIN — ENOXAPARIN SODIUM 40 MG: 40 INJECTION SUBCUTANEOUS at 05:23

## 2020-08-22 RX ADMIN — OXYCODONE HYDROCHLORIDE 5 MG: 5 TABLET ORAL at 20:44

## 2020-08-22 RX ADMIN — OXYCODONE HYDROCHLORIDE 5 MG: 5 TABLET ORAL at 05:24

## 2020-08-22 RX ADMIN — RISPERIDONE 0.5 MG: 0.5 TABLET ORAL at 17:28

## 2020-08-22 NOTE — CARE PLAN
Problem: Knowledge Deficit  Goal: Knowledge of disease process/condition, treatment plan, diagnostic tests, and medications will improve  Outcome: PROGRESSING AS EXPECTED  Note: Information regarding disease process/condition explained,question answered.  Updated with plan of care, verbalized understanding.      Problem: Safety  Goal: Will remain free from falls  Outcome: PROGRESSING AS EXPECTED  Note: Hourly rounding.  Non-skid socks. Bed locked & in low position. Personal belongings and call light  within reach. .      Problem: Pain Management  Goal: Pain level will decrease to patient's comfort goal  Outcome: PROGRESSING AS EXPECTED  Note: Taught pt 0-10 pain scale and  non pharmacological method of pain mgt, encouraged to verbalize when in pain. Administered PRN pain med as needed.

## 2020-08-22 NOTE — CARE PLAN
Problem: Safety  Goal: Will remain free from falls  Outcome: PROGRESSING AS EXPECTED  Patient is non compliant with fall measures.  He spends large parts of the day on his feet, preferring to be up rather then sit.  Declined to wait for staff assistance, has a patient safety sitter for impulsivity.      Problem: Pain Management  Goal: Pain level will decrease to patient's comfort goal  Outcome: PROGRESSING AS EXPECTED  Pain controlled with prn medication.

## 2020-08-23 PROCEDURE — A9270 NON-COVERED ITEM OR SERVICE: HCPCS | Performed by: NURSE PRACTITIONER

## 2020-08-23 PROCEDURE — 700102 HCHG RX REV CODE 250 W/ 637 OVERRIDE(OP): Performed by: INTERNAL MEDICINE

## 2020-08-23 PROCEDURE — A9270 NON-COVERED ITEM OR SERVICE: HCPCS | Performed by: HOSPITALIST

## 2020-08-23 PROCEDURE — 770021 HCHG ROOM/CARE - ISO PRIVATE

## 2020-08-23 PROCEDURE — 700111 HCHG RX REV CODE 636 W/ 250 OVERRIDE (IP): Performed by: HOSPITALIST

## 2020-08-23 PROCEDURE — 700102 HCHG RX REV CODE 250 W/ 637 OVERRIDE(OP): Performed by: NURSE PRACTITIONER

## 2020-08-23 PROCEDURE — 700102 HCHG RX REV CODE 250 W/ 637 OVERRIDE(OP): Performed by: HOSPITALIST

## 2020-08-23 PROCEDURE — A9270 NON-COVERED ITEM OR SERVICE: HCPCS | Performed by: INTERNAL MEDICINE

## 2020-08-23 RX ADMIN — DOCUSATE SODIUM 50 MG AND SENNOSIDES 8.6 MG 2 TABLET: 8.6; 5 TABLET, FILM COATED ORAL at 17:03

## 2020-08-23 RX ADMIN — DIVALPROEX SODIUM 125 MG: 125 CAPSULE ORAL at 22:40

## 2020-08-23 RX ADMIN — ENOXAPARIN SODIUM 40 MG: 40 INJECTION SUBCUTANEOUS at 05:39

## 2020-08-23 RX ADMIN — DIVALPROEX SODIUM 125 MG: 125 CAPSULE ORAL at 05:41

## 2020-08-23 RX ADMIN — Medication 400 MG: at 17:02

## 2020-08-23 RX ADMIN — OXYCODONE HYDROCHLORIDE 5 MG: 5 TABLET ORAL at 22:42

## 2020-08-23 RX ADMIN — RISPERIDONE 0.5 MG: 0.5 TABLET ORAL at 17:02

## 2020-08-23 RX ADMIN — OXYCODONE HYDROCHLORIDE 5 MG: 5 TABLET ORAL at 13:07

## 2020-08-23 RX ADMIN — GABAPENTIN 100 MG: 100 CAPSULE ORAL at 13:07

## 2020-08-23 RX ADMIN — DOCUSATE SODIUM 50 MG AND SENNOSIDES 8.6 MG 2 TABLET: 8.6; 5 TABLET, FILM COATED ORAL at 05:39

## 2020-08-23 RX ADMIN — GABAPENTIN 100 MG: 100 CAPSULE ORAL at 17:02

## 2020-08-23 RX ADMIN — AMLODIPINE BESYLATE 5 MG: 5 TABLET ORAL at 05:40

## 2020-08-23 RX ADMIN — OXYCODONE HYDROCHLORIDE 5 MG: 5 TABLET ORAL at 17:02

## 2020-08-23 RX ADMIN — Medication 400 MG: at 05:41

## 2020-08-23 RX ADMIN — RISPERIDONE 0.5 MG: 0.5 TABLET ORAL at 05:40

## 2020-08-23 RX ADMIN — NICOTINE TRANSDERMAL SYSTEM 21 MG: 21 PATCH, EXTENDED RELEASE TRANSDERMAL at 05:39

## 2020-08-23 RX ADMIN — DIVALPROEX SODIUM 125 MG: 125 CAPSULE ORAL at 13:07

## 2020-08-23 RX ADMIN — GABAPENTIN 100 MG: 100 CAPSULE ORAL at 05:39

## 2020-08-23 NOTE — CARE PLAN
Problem: Infection  Goal: Will remain free from infection  Outcome: PROGRESSING AS EXPECTED  Wound care dressing clean, dry, intact, afebrile.      Problem: Pain Management  Goal: Pain level will decrease to patient's comfort goal  Outcome: PROGRESSING AS EXPECTED   Pain controlled with standing and prn medication.

## 2020-08-24 PROCEDURE — A9270 NON-COVERED ITEM OR SERVICE: HCPCS | Performed by: INTERNAL MEDICINE

## 2020-08-24 PROCEDURE — A9270 NON-COVERED ITEM OR SERVICE: HCPCS | Performed by: HOSPITALIST

## 2020-08-24 PROCEDURE — 99231 SBSQ HOSP IP/OBS SF/LOW 25: CPT | Performed by: INTERNAL MEDICINE

## 2020-08-24 PROCEDURE — 700102 HCHG RX REV CODE 250 W/ 637 OVERRIDE(OP): Performed by: HOSPITALIST

## 2020-08-24 PROCEDURE — 700102 HCHG RX REV CODE 250 W/ 637 OVERRIDE(OP): Performed by: NURSE PRACTITIONER

## 2020-08-24 PROCEDURE — 700111 HCHG RX REV CODE 636 W/ 250 OVERRIDE (IP): Performed by: HOSPITALIST

## 2020-08-24 PROCEDURE — 770021 HCHG ROOM/CARE - ISO PRIVATE

## 2020-08-24 PROCEDURE — A9270 NON-COVERED ITEM OR SERVICE: HCPCS | Performed by: NURSE PRACTITIONER

## 2020-08-24 PROCEDURE — 700102 HCHG RX REV CODE 250 W/ 637 OVERRIDE(OP): Performed by: INTERNAL MEDICINE

## 2020-08-24 RX ADMIN — DOCUSATE SODIUM 50 MG AND SENNOSIDES 8.6 MG 2 TABLET: 8.6; 5 TABLET, FILM COATED ORAL at 06:51

## 2020-08-24 RX ADMIN — DIVALPROEX SODIUM 125 MG: 125 CAPSULE ORAL at 21:26

## 2020-08-24 RX ADMIN — DIVALPROEX SODIUM 125 MG: 125 CAPSULE ORAL at 15:33

## 2020-08-24 RX ADMIN — RISPERIDONE 0.5 MG: 0.5 TABLET ORAL at 06:51

## 2020-08-24 RX ADMIN — Medication 400 MG: at 06:51

## 2020-08-24 RX ADMIN — AMLODIPINE BESYLATE 5 MG: 5 TABLET ORAL at 06:51

## 2020-08-24 RX ADMIN — OXYCODONE HYDROCHLORIDE 5 MG: 5 TABLET ORAL at 06:51

## 2020-08-24 RX ADMIN — DIVALPROEX SODIUM 125 MG: 125 CAPSULE ORAL at 06:51

## 2020-08-24 RX ADMIN — OXYCODONE HYDROCHLORIDE 5 MG: 5 TABLET ORAL at 17:08

## 2020-08-24 RX ADMIN — GABAPENTIN 100 MG: 100 CAPSULE ORAL at 16:55

## 2020-08-24 RX ADMIN — RISPERIDONE 0.5 MG: 0.5 TABLET ORAL at 16:54

## 2020-08-24 RX ADMIN — ENOXAPARIN SODIUM 40 MG: 40 INJECTION SUBCUTANEOUS at 06:52

## 2020-08-24 RX ADMIN — GABAPENTIN 100 MG: 100 CAPSULE ORAL at 06:51

## 2020-08-24 RX ADMIN — GABAPENTIN 100 MG: 100 CAPSULE ORAL at 12:03

## 2020-08-24 RX ADMIN — Medication 400 MG: at 16:55

## 2020-08-24 NOTE — PROGRESS NOTES
Bedside shift report performed with nightshift RNLouie. Pt ambulating in room, no signs of pain, and no requests at this time. Plan of care discussed with pt. Call light placed within pt reach.

## 2020-08-24 NOTE — CARE PLAN
Problem: Safety  Goal: Will remain free from falls  Outcome: PROGRESSING AS EXPECTED     Problem: Infection  Goal: Will remain free from infection  Outcome: PROGRESSING AS EXPECTED     Problem: Venous Thromboembolism (VTW)/Deep Vein Thrombosis (DVT) Prevention:  Goal: Patient will participate in Venous Thrombosis (VTE)/Deep Vein Thrombosis (DVT)Prevention Measures  Outcome: PROGRESSING AS EXPECTED     Problem: Bowel/Gastric:  Goal: Normal bowel function is maintained or improved  Outcome: PROGRESSING AS EXPECTED  Goal: Will not experience complications related to bowel motility  Outcome: PROGRESSING AS EXPECTED     Problem: Knowledge Deficit  Goal: Knowledge of disease process/condition, treatment plan, diagnostic tests, and medications will improve  Outcome: PROGRESSING AS EXPECTED  Goal: Knowledge of the prescribed therapeutic regimen will improve  Outcome: PROGRESSING AS EXPECTED     Problem: Mobility  Goal: Risk for activity intolerance will decrease  Outcome: PROGRESSING AS EXPECTED

## 2020-08-24 NOTE — PROGRESS NOTES
Hospital Medicine Daily Progress Note    Date of Service  8/24/2020    Chief Complaint  LLE wound    Hospital Course   Mr. Varela is a 64-year-old male with PMH significant for homelessness, etoh use, tobacco dependence and chronic LLE wound who presented 8/7/2020 with LLE wound infection.  He was hospitalized at our facility in April and evaluated by orthopedic surgery who recommended wound care.  Wound cultures at that time grew MSSA and strep.  Patient reported losing his Medicaid card and having no transportation to wound clinic.  He was seen at [facility] earlier this week and prescribed ABX, however he has not filled the prescription.  US LLE negative for DVT.  Treated for sepsis with empiric ABX and wound care.  He was found to have head lice and underwent treatments. Continued to have intermittent agitation so was started on risperdol, depakote and gabapentin per psych recs.  He has been deemed incapacitated to make medical decisions and is currently pending guardianship and placement.  He is medically stable and will be only be evaluated 2 times weekly unless there is a clinical change.          Interval Problem Update  No acute events. Has completed antibiotics. Deemed incapacitated to make medical decisions. Guardianship pending.     Consultants/Specialty  Psychiatry    Code Status  Full Code    Disposition  Will need guardianship and placement    Review of Systems  Review of Systems   Unable to perform ROS: Psychiatric disorder        Physical Exam       Physical Exam  Vitals signs and nursing note reviewed. Exam conducted with a chaperone present.   Constitutional:       General: He is awake. He is not in acute distress.     Appearance: He is underweight. He is not toxic-appearing or diaphoretic.   HENT:      Head: Normocephalic and atraumatic.      Nose: Nose normal.      Mouth/Throat:      Lips: Pink.      Mouth: Mucous membranes are moist.   Eyes:      General: No scleral icterus.      Conjunctiva/sclera: Conjunctivae normal.   Neck:      Musculoskeletal: Normal range of motion.   Cardiovascular:      Rate and Rhythm: Normal rate.   Pulmonary:      Effort: Pulmonary effort is normal.   Abdominal:      General: There is no distension.      Tenderness: There is no abdominal tenderness.   Musculoskeletal:      Right lower leg: No edema.      Left lower leg: No edema.   Skin:     General: Skin is warm and dry.      Comments: Left leg wounds without surrounding erythema or drainage.     Neurological:      Mental Status: He is alert.      Gait: Gait normal.      Comments: Oriented x 2 (person place, thought it was 2008)  Confused intermittently   Psychiatric:         Mood and Affect: Mood and affect normal.         Behavior: Behavior is not aggressive or combative. Behavior is cooperative.      Comments:        No change in PE since prior exam.     Fluids    Intake/Output Summary (Last 24 hours) at 8/24/2020 1633  Last data filed at 8/23/2020 1800  Gross per 24 hour   Intake 600 ml   Output --   Net 600 ml       Laboratory                        Imaging  CT-HEAD W/O   Final Result      No acute intracranial abnormality      DX-TIBIA AND FIBULA LEFT   Final Result      1.  Healed distal metaphyseal fractures of the left fibula and tibia with tibial IM layla in place.      2.  No acute fracture or dislocation.      3.  No radiopaque soft tissue foreign body.      DX-FEMUR-2+ LEFT   Final Result      1.  No radiographic evidence of acute traumatic injury left femur.      2.  Unchanged cortical thickening in the posterior aspect of the distal femoral diaphysis which may represent sequela of prior injury or infection.      3.  No soft tissue foreign body identified.      US-EXTREMITY VENOUS LOWER UNILAT LEFT   Final Result      DX-CHEST-PORTABLE (1 VIEW)   Final Result      No acute cardiopulmonary abnormality.           Assessment/Plan  Open wound of left lower leg- (present on admission)  Assessment &  Plan  -chronic  -poor compliance with OP wound care. He can't remember the last time he had his dressing changed OP  -wound care  -Previous cultures grew MSSA and group A strep.  Wound cultures from this hospitalization also growing MSSA and group A strep  -blood cultures (-) to date  -afebrile. Leukocytosis resolved  -continue ABX unasyn 8/7-8/11 -->augmentin 8/11, end date 8/21  -LLE US (-) DVT    Hypomagnesemia  Assessment & Plan  -Likely related to EtOH abuse history  -Resolved    Hypokalemia- (present on admission)  Assessment & Plan  -Also with hypomagnesemia  -Resolved after replacement    Flexion contracture of knee, 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)  Assessment & Plan  -secondary to chronic wound in that area  -PT OT  -encourage mobility      Head lice  Assessment & Plan  Head lice found  S/p first round of treatment 8/10, treated again on 8/13 given active lice seen by CNA, will need second treatment 8/20  If still having issues will likely need to shave patient    Encephalopathy- (present on admission)  Assessment & Plan  -acute on chronic  -likely due to significant EtOH history.  Possible component of Warnicke's encephalopathy  -Psychiatry evaluated him and deemed him incapacitated to make medical decision or leave AMA and recommend SLP cognitive eval.  Patient will likely require guardianship and placement  -CT head unremarkable  -no acute neurological deficits  -Avoid narcotics and hypnotics.  Cautious use of benzodiazepines for alcohol withdrawal protocol  -Frequent reorientation  -Sleep hygiene      Non-compliance  Assessment & Plan  -likely also component of psychiatric disorder and ETOH abuse  -Psychiatric consulted and suggests patient is incapacitated to make medical decisions or leave AMA  -ongoing encouragement for compliance.    Psychiatric disorder  Assessment & Plan  -unknown/unspecified  -not currently on medication  -history of severe alcohol dependence.  -psychiatry consult today for  "agitation - incapacitated to leave AMA or make medical decisions, appreciate recs: risperdol 0.5mg BID, added depakote 125mg TID (8/15/20, watch LFTs), added neurotin 100mg TID 8/16  -Pending guardianship.       Alcohol dependence (HCC)- (present on admission)  Assessment & Plan  -history of.  Ongoing.  He reports drinking \"once in a while\"  -s/p WD, was on CIWA, uncomplicated  -MVI and thiamine    Protein malnutrition (HCC)  Assessment & Plan  -history of ETOH and homelessness  -dietary consult  -TID supplements  -encourage PO intake    Sepsis (HCC)- (present on admission)  Assessment & Plan  -This is Sepsis Present on admission  SIRS criteria identified on my evaluation include: Leukocytosis, with WBC greater than 12,000   Source is cellulitis and wound infection  Sepsis protocol initiated  Fluid resuscitation ordered per protocol  IV antibiotics as appropriate for source of sepsis  While organ dysfunction may be noted elsewhere in this problem list or in the chart, degree of organ dysfunction does not meet CMS criteria for severe sepsis  -afebrile  -lactic WNL  -blood cultures negative to date  -Sepsis has resolved        Tobacco dependence- (present on admission)  Assessment & Plan  -Nicotine replacement protocol         VTE prophylaxis: Enoxaparin            "

## 2020-08-25 PROCEDURE — A9270 NON-COVERED ITEM OR SERVICE: HCPCS | Performed by: INTERNAL MEDICINE

## 2020-08-25 PROCEDURE — 770021 HCHG ROOM/CARE - ISO PRIVATE

## 2020-08-25 PROCEDURE — 700102 HCHG RX REV CODE 250 W/ 637 OVERRIDE(OP): Performed by: NURSE PRACTITIONER

## 2020-08-25 PROCEDURE — 700102 HCHG RX REV CODE 250 W/ 637 OVERRIDE(OP): Performed by: INTERNAL MEDICINE

## 2020-08-25 PROCEDURE — A9270 NON-COVERED ITEM OR SERVICE: HCPCS | Performed by: HOSPITALIST

## 2020-08-25 PROCEDURE — 700111 HCHG RX REV CODE 636 W/ 250 OVERRIDE (IP): Performed by: HOSPITALIST

## 2020-08-25 PROCEDURE — A9270 NON-COVERED ITEM OR SERVICE: HCPCS | Performed by: NURSE PRACTITIONER

## 2020-08-25 PROCEDURE — 700102 HCHG RX REV CODE 250 W/ 637 OVERRIDE(OP): Performed by: HOSPITALIST

## 2020-08-25 RX ADMIN — DIVALPROEX SODIUM 125 MG: 125 CAPSULE ORAL at 05:20

## 2020-08-25 RX ADMIN — OXYCODONE HYDROCHLORIDE 5 MG: 5 TABLET ORAL at 05:20

## 2020-08-25 RX ADMIN — DOCUSATE SODIUM 50 MG AND SENNOSIDES 8.6 MG 2 TABLET: 8.6; 5 TABLET, FILM COATED ORAL at 05:20

## 2020-08-25 RX ADMIN — DIVALPROEX SODIUM 125 MG: 125 CAPSULE ORAL at 21:22

## 2020-08-25 RX ADMIN — OXYCODONE HYDROCHLORIDE 5 MG: 5 TABLET ORAL at 17:23

## 2020-08-25 RX ADMIN — DIVALPROEX SODIUM 125 MG: 125 CAPSULE ORAL at 14:13

## 2020-08-25 RX ADMIN — RISPERIDONE 0.5 MG: 0.5 TABLET ORAL at 05:20

## 2020-08-25 RX ADMIN — Medication 400 MG: at 05:20

## 2020-08-25 RX ADMIN — GABAPENTIN 100 MG: 100 CAPSULE ORAL at 05:20

## 2020-08-25 RX ADMIN — GABAPENTIN 100 MG: 100 CAPSULE ORAL at 17:23

## 2020-08-25 RX ADMIN — RISPERIDONE 0.5 MG: 0.5 TABLET ORAL at 17:23

## 2020-08-25 RX ADMIN — AMLODIPINE BESYLATE 5 MG: 5 TABLET ORAL at 05:20

## 2020-08-25 RX ADMIN — Medication 400 MG: at 17:23

## 2020-08-25 RX ADMIN — GABAPENTIN 100 MG: 100 CAPSULE ORAL at 14:13

## 2020-08-25 ASSESSMENT — FIBROSIS 4 INDEX: FIB4 SCORE: 0.66

## 2020-08-25 NOTE — PROGRESS NOTES
I certify that the patient requires continued medically necessary hospital services for the treatment of chronic leg wounds and will remain in the hospital for foreseeable future.  Discharge plan is anticipated to be guardianship with group home or skilled facility.

## 2020-08-25 NOTE — DIETARY
"Nutrition Services:  Day 18 of admit.  Bola Varela is a 64 y.o. male with admitting DX of Unspecified open wound, left lower leg, initial encounter    Pt is currently on Regular Level 5 - Minced & Moist (Dysphagia 2) diet. PO intake %. Wt 60.2 kg via bed scale (8/18). Body mass index is 18.52 kg/m². Normal/borderline underweight. New weight requested. Ordered Boost Plus with meals per MD note yesterday \"supplements TID.\"    Malnutrition Risk (8/10): Severe malnutrition in the context of starvation-related as related to being homeless as evidenced by severe muscle and severe fat loss.    Recommendations/Plan:  1. Boost Plus TID with hugo   2. Encourage intake of meals  3. Document intake of all meals as % taken in ADL's to provide interdisciplinary communication across all shifts.   4. Monitor weight.  5. Nutrition rep will continue to see patient for ongoing meal and snack preferences.    RD following weekly    "

## 2020-08-25 NOTE — CARE PLAN
Problem: Safety  Goal: Will remain free from falls  Outcome: PROGRESSING AS EXPECTED   Bed in lowest position, call within reach, rounding in place, low risk for falls, cna sitter at bedside  Problem: Infection  Goal: Will remain free from infection  Outcome: PROGRESSING AS EXPECTED   Standard precautions in place, patient educated on proper hand washing, nutrition promoted, IS used

## 2020-08-25 NOTE — PROGRESS NOTES
"0500: Attempted to perform wound care dressing change on pt per order. Unable to complete task. No way to order \"Vanessa Promogran.\"     Joao Carvalho RN.  "

## 2020-08-26 PROCEDURE — 99231 SBSQ HOSP IP/OBS SF/LOW 25: CPT | Performed by: NURSE PRACTITIONER

## 2020-08-26 PROCEDURE — 770021 HCHG ROOM/CARE - ISO PRIVATE

## 2020-08-26 PROCEDURE — 700102 HCHG RX REV CODE 250 W/ 637 OVERRIDE(OP): Performed by: INTERNAL MEDICINE

## 2020-08-26 PROCEDURE — A9270 NON-COVERED ITEM OR SERVICE: HCPCS | Performed by: HOSPITALIST

## 2020-08-26 PROCEDURE — 700102 HCHG RX REV CODE 250 W/ 637 OVERRIDE(OP): Performed by: NURSE PRACTITIONER

## 2020-08-26 PROCEDURE — A9270 NON-COVERED ITEM OR SERVICE: HCPCS | Performed by: INTERNAL MEDICINE

## 2020-08-26 PROCEDURE — 700102 HCHG RX REV CODE 250 W/ 637 OVERRIDE(OP): Performed by: HOSPITALIST

## 2020-08-26 PROCEDURE — A9270 NON-COVERED ITEM OR SERVICE: HCPCS | Performed by: NURSE PRACTITIONER

## 2020-08-26 RX ORDER — HALOPERIDOL 5 MG/ML
2-5 INJECTION INTRAMUSCULAR EVERY 4 HOURS PRN
Status: DISCONTINUED | OUTPATIENT
Start: 2020-08-26 | End: 2021-01-13 | Stop reason: ALTCHOICE

## 2020-08-26 RX ADMIN — GABAPENTIN 100 MG: 100 CAPSULE ORAL at 13:42

## 2020-08-26 RX ADMIN — DIVALPROEX SODIUM 125 MG: 125 CAPSULE ORAL at 05:44

## 2020-08-26 RX ADMIN — GABAPENTIN 100 MG: 100 CAPSULE ORAL at 05:44

## 2020-08-26 RX ADMIN — OXYCODONE HYDROCHLORIDE 5 MG: 5 TABLET ORAL at 21:10

## 2020-08-26 RX ADMIN — DOCUSATE SODIUM 50 MG AND SENNOSIDES 8.6 MG 2 TABLET: 8.6; 5 TABLET, FILM COATED ORAL at 16:50

## 2020-08-26 RX ADMIN — AMLODIPINE BESYLATE 5 MG: 5 TABLET ORAL at 05:44

## 2020-08-26 RX ADMIN — GABAPENTIN 100 MG: 100 CAPSULE ORAL at 16:50

## 2020-08-26 RX ADMIN — DIVALPROEX SODIUM 125 MG: 125 CAPSULE ORAL at 13:42

## 2020-08-26 RX ADMIN — OXYCODONE HYDROCHLORIDE 5 MG: 5 TABLET ORAL at 05:44

## 2020-08-26 RX ADMIN — RISPERIDONE 0.5 MG: 0.5 TABLET ORAL at 16:50

## 2020-08-26 RX ADMIN — ACETAMINOPHEN 650 MG: 325 TABLET, FILM COATED ORAL at 16:50

## 2020-08-26 RX ADMIN — OXYCODONE HYDROCHLORIDE 5 MG: 5 TABLET ORAL at 14:37

## 2020-08-26 RX ADMIN — Medication 400 MG: at 05:44

## 2020-08-26 RX ADMIN — DIVALPROEX SODIUM 125 MG: 125 CAPSULE ORAL at 21:10

## 2020-08-26 RX ADMIN — RISPERIDONE 0.5 MG: 0.5 TABLET ORAL at 05:44

## 2020-08-26 RX ADMIN — Medication 400 MG: at 16:50

## 2020-08-26 RX ADMIN — OXYCODONE HYDROCHLORIDE 5 MG: 5 TABLET ORAL at 10:30

## 2020-08-26 NOTE — WOUND TEAM
[facility]  Inpatient Services  Wound and Skin Care Progress Note    Admission Date: 8/7/2020     Last order of IP CONSULT TO WOUND CARE was found on 8/7/2020 from Hospital Encounter on 8/7/2020     HPI, PMH, SH: Reviewed    Unit where seen by Wound Team: T309/00     WOUND CONSULT/FOLLOW UP RELATED TO:  Left leg chronic wound      Self Report / Pain Level:  Pt denies pain, no pain medication needed.        OBJECTIVE:  Pt standing in room watching television.    WOUND TYPE, LOCATION, CHARACTERISTICS (Pressure Injuries: location, stage, POA or date identified)    Wound 08/07/20 Full Thickness Wound Leg LLE posterior, extends medially and laterally (Active)   Wound Image     08/26/20 0900   Site Assessment Red;Yellow 08/26/20 0900   Periwound Assessment Clean;Dry;Intact 08/26/20 0900   Margins Attached edges;Defined edges;Epibole (rolled edges) 08/26/20 0900   Closure Secondary intention 08/26/20 0900   Drainage Amount Copious 08/26/20 0900   Drainage Description Serosanguineous 08/26/20 0900   Treatments Cleansed;Irrigation;Site care 08/26/20 0900   Wound Cleansing Approved Wound Cleanser 08/26/20 0900   Periwound Protectant Skin Protectant Wipes to Periwound 08/26/20 0900   Dressing Cleansing/Solutions Not Applicable 08/26/20 0900   Dressing Options Collagen Dressing;Calcium Alginate;Mepilex;Tubigrip 08/26/20 0900   Dressing Changed Changed 08/26/20 0900   Dressing Status Clean;Dry;Intact 08/26/20 0900   Dressing Change/Treatment Frequency Every 72 hrs, and As Needed 08/26/20 0900   NEXT Dressing Change/Treatment Date 08/29/20 08/26/20 0900   NEXT Weekly Photo (Inpatient Only) 09/02/20 08/26/20 0900   Non-staged Wound Description Full thickness 08/26/20 0900   Wound Length (cm) 33 cm 08/26/20 0900   Wound Width (cm) 18 cm 08/26/20 0900   Wound Depth (cm) 0.2 cm 08/26/20 0900   Wound Surface Area (cm^2) 594 cm^2 08/26/20 0900   Wound Volume (cm^3) 118.8 cm^3 08/26/20 0900   Wound Healing % -126  08/26/20 0900   Shape irregular elongated 08/26/20 0900   Wound Odor Mild 08/26/20 0900   Pulses Left;2+;DP;PT 08/26/20 0900   Exposed Structures None 08/26/20 0900   WOUND NURSE ONLY - Time Spent with Patient (mins) 60 08/26/20 0900            Vascular:    KOSTAS:   No results found.    Lab Values:    Lab Results   Component Value Date/Time    WBC 6.6 08/09/2020 01:22 AM    RBC 3.48 (L) 08/09/2020 01:22 AM    HEMOGLOBIN 10.4 (L) 08/09/2020 01:22 AM    HEMATOCRIT 31.7 (L) 08/09/2020 01:22 AM    CREACTPROT 1.07 (H) 08/12/2020 01:55 PM    SEDRATEWES 85 (H) 08/07/2020 01:20 PM    HBA1C 5.7 (H) 11/09/2018 06:30 PM        Culture Results: 08/07/2020: Positive staph aureus and beta hemolytic strep.      INTERVENTIONS BY WOUND TEAM:  Prior dressing removed.  Site cleansed.  Vanessa applied to wound bed, covered with alginate, then sacral mepilex x2.  Tubigrip F to secure all.  Pt tolerated well, no pain medication needed.    Interdisciplinary consultation: Patient, Bedside RN (Adalberto)    EVALUATION:    8/26: wound bed is responding to collagen dressing, will update nursing dressing.  Will have wound team handle collagen dressings and have Hydrofiber dressing with nursing changes.   8/15:wound bed red, slighly shiny, appears to be responding to collagen dressing.  Pt has thick indurated edges that will delay healing.  This wound is also very chronic and cellular activity is clearly diminished, collagen should help encourage tissue growth.        Patient seen on previous admit for same wound.  Per pt, wound was obtained after a traumatic fall about 2 years ago.  Patient has been non compliant with follow up.  History of homelessness.    Goals: Steady decrease in wound area and depth weekly.    NURSING PLAN OF CARE ORDERS (X):    Dressing changes: See Dressing Care orders: X  Skin care: See Skin Care orders: moves self   Rectal tube care: See Rectal Tube Care orders:   Other orders:    WOUND TEAM PLAN OF CARE:   Dressing  changes by wound team:       Follow up 3 times weekly:                NPWT change 3 times weekly:     Follow up 1-2 times weekly:    X (Dsng change 1x/wk by wound team on Wed.  Dsng change q72 hrs per bedside nursing.  Follow up Bi-Monthly:                   Follow up as needed:       Other (explain):     Anticipated discharge plans: will need ongoing wound care post DC - history non compliant in past.    LTACH:        SNF/Rehab:                  Home Health Care:           Outpatient Wound Center:   x       Self Care:

## 2020-08-26 NOTE — CARE PLAN
Problem: Safety  Goal: Will remain free from falls  Outcome: PROGRESSING AS EXPECTED   Bed in lowest position, call within reach, rounding in place, low risk for falls, educated to call for assistance if any dizziness.   Problem: Infection  Goal: Will remain free from infection  Outcome: PROGRESSING AS EXPECTED   Standard precautions in place, patient educated on proper hand washing, nutrition promoted, IS used

## 2020-08-26 NOTE — PROGRESS NOTES
[facility] Medicine  Weekly Progress Note    Date of Service  8/26/2020    Chief Complaint  LLE wound    Hospital Course   Mr. Varela is a 64-year-old male with PMH significant for homelessness, etoh use, tobacco dependence and chronic LLE wound who presented 8/7/2020 with LLE wound infection.  He was hospitalized at our facility in April and evaluated by orthopedic surgery who recommended wound care.  Wound cultures at that time grew MSSA and strep.  Patient reported losing his Medicaid card and having no transportation to wound clinic.  He was seen at [facility] earlier this week and prescribed ABX, however he has not filled the prescription.  US LLE negative for DVT.  Treated for sepsis with empiric ABX and wound care.  He was found to have head lice and underwent treatments. Continued to have intermittent agitation so was started on risperdol, depakote and gabapentin per psych recs.  He has been deemed incapacitated to make medical decisions and is currently pending guardianship and placement.  He is medically stable and will be only be evaluated 2 times weekly unless there is a clinical change.          Interval Problem Update  - Patient walking around room, in pleasant mood. States some pain to left leg, otherwise no complaints. Per nursing patient has been restless and having verbal outbursts. Has not required medication intervention yet today. Guardianship process initiated per case management.   - VSS  - Last blood work on 8/15- reviewed, will order CMP to evaluate LFT's with addition of psych medications in last 2 weeks.   - PE and ROS unchanged from previous exams    Consultants/Specialty  Psychiatry    Code Status  Full Code    Disposition  Pursuing guardianship and placement        Assessment/Plan  Open wound of left lower leg- (present on admission)  Assessment & Plan  -chronic  -poor compliance with OP wound care. He can't remember the last time he had his dressing changed OP  -wound  care  -Previous cultures grew MSSA and group A strep.  Wound cultures from this hospitalization also growing MSSA and group A strep  -blood cultures (-) to date  -afebrile. Leukocytosis resolved  -continue ABX unasyn 8/7-8/11 -->augmentin 8/11, end date 8/21  -LLE US (-) DVT    Hypomagnesemia  Assessment & Plan  -Likely related to EtOH abuse history  -Resolved    Hypokalemia- (present on admission)  Assessment & Plan  -Also with hypomagnesemia  -Resolved after replacement    Flexion contracture of knee, 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)  Assessment & Plan  -secondary to chronic wound in that area  -PT OT  -encourage mobility      Head lice  Assessment & Plan  Head lice found  S/p first round of treatment 8/10, treated again on 8/13 given active lice seen by CNA, will need second treatment 8/20  If still having issues will likely need to shave patient    Encephalopathy- (present on admission)  Assessment & Plan  -acute on chronic  -likely due to significant EtOH history.  Possible component of Warnicke's encephalopathy  -Psychiatry evaluated him and deemed him incapacitated to make medical decision or leave AMA and recommend SLP cognitive eval.  Patient will likely require guardianship and placement  -CT head unremarkable  -no acute neurological deficits  -Avoid narcotics and hypnotics.  Cautious use of benzodiazepines for alcohol withdrawal protocol  -Frequent reorientation  -Sleep hygiene      Non-compliance  Assessment & Plan  -likely also component of psychiatric disorder and ETOH abuse  -Psychiatric consulted and suggests patient is incapacitated to make medical decisions or leave AMA  -ongoing encouragement for compliance.    Psychiatric disorder  Assessment & Plan  -unknown/unspecified  -not currently on medication  -history of severe alcohol dependence.  -psychiatry consulted for agitation - incapacitated to leave AMA or make medical decisions, appreciate recs: risperdol 0.5mg BID, added depakote 125mg TID  "(8/15/20, watch LFTs), added neurotin 100mg TID 8/16  -Pending guardianship.       Alcohol dependence (HCC)- (present on admission)  Assessment & Plan  -history of.  Ongoing.  He reports drinking \"once in a while\"  -s/p WD, was on CIWA, uncomplicated  -MVI and thiamine    Protein malnutrition (HCC)  Assessment & Plan  -history of ETOH and homelessness  -dietary consult  -TID supplements  -encourage PO intake    Sepsis (HCC)- (present on admission)  Assessment & Plan  -This is Sepsis Present on admission  SIRS criteria identified on my evaluation include: Leukocytosis, with WBC greater than 12,000   Source is cellulitis and wound infection  Sepsis protocol initiated  Fluid resuscitation ordered per protocol  IV antibiotics as appropriate for source of sepsis  While organ dysfunction may be noted elsewhere in this problem list or in the chart, degree of organ dysfunction does not meet CMS criteria for severe sepsis  -afebrile  -lactic WNL  -blood cultures negative to date  -Sepsis has resolved        Tobacco dependence- (present on admission)  Assessment & Plan  -Nicotine replacement protocol         VTE prophylaxis: Enoxaparin            "

## 2020-08-27 LAB
ALBUMIN SERPL BCP-MCNC: 3.5 G/DL (ref 3.2–4.9)
ALBUMIN/GLOB SERPL: 1.1 G/DL
ALP SERPL-CCNC: 75 U/L (ref 30–99)
ALT SERPL-CCNC: 15 U/L (ref 2–50)
ANION GAP SERPL CALC-SCNC: 13 MMOL/L (ref 7–16)
AST SERPL-CCNC: 15 U/L (ref 12–45)
BILIRUB SERPL-MCNC: <0.2 MG/DL (ref 0.1–1.5)
BUN SERPL-MCNC: 24 MG/DL (ref 8–22)
CALCIUM SERPL-MCNC: 9.1 MG/DL (ref 8.5–10.5)
CHLORIDE SERPL-SCNC: 95 MMOL/L (ref 96–112)
CO2 SERPL-SCNC: 25 MMOL/L (ref 20–33)
CREAT SERPL-MCNC: 0.81 MG/DL (ref 0.5–1.4)
GLOBULIN SER CALC-MCNC: 3.2 G/DL (ref 1.9–3.5)
GLUCOSE SERPL-MCNC: 87 MG/DL (ref 65–99)
POTASSIUM SERPL-SCNC: 4.5 MMOL/L (ref 3.6–5.5)
PROT SERPL-MCNC: 6.7 G/DL (ref 6–8.2)
SODIUM SERPL-SCNC: 133 MMOL/L (ref 135–145)

## 2020-08-27 PROCEDURE — 80053 COMPREHEN METABOLIC PANEL: CPT

## 2020-08-27 PROCEDURE — 36415 COLL VENOUS BLD VENIPUNCTURE: CPT

## 2020-08-27 PROCEDURE — 700102 HCHG RX REV CODE 250 W/ 637 OVERRIDE(OP): Performed by: HOSPITALIST

## 2020-08-27 PROCEDURE — A9270 NON-COVERED ITEM OR SERVICE: HCPCS | Performed by: HOSPITALIST

## 2020-08-27 PROCEDURE — A9270 NON-COVERED ITEM OR SERVICE: HCPCS | Performed by: INTERNAL MEDICINE

## 2020-08-27 PROCEDURE — 700102 HCHG RX REV CODE 250 W/ 637 OVERRIDE(OP): Performed by: INTERNAL MEDICINE

## 2020-08-27 PROCEDURE — 92526 ORAL FUNCTION THERAPY: CPT

## 2020-08-27 PROCEDURE — 700102 HCHG RX REV CODE 250 W/ 637 OVERRIDE(OP): Performed by: NURSE PRACTITIONER

## 2020-08-27 PROCEDURE — 770021 HCHG ROOM/CARE - ISO PRIVATE

## 2020-08-27 PROCEDURE — A9270 NON-COVERED ITEM OR SERVICE: HCPCS | Performed by: NURSE PRACTITIONER

## 2020-08-27 RX ADMIN — RISPERIDONE 0.5 MG: 0.5 TABLET ORAL at 06:56

## 2020-08-27 RX ADMIN — OXYCODONE HYDROCHLORIDE 5 MG: 5 TABLET ORAL at 14:26

## 2020-08-27 RX ADMIN — GABAPENTIN 100 MG: 100 CAPSULE ORAL at 17:29

## 2020-08-27 RX ADMIN — OXYCODONE HYDROCHLORIDE 5 MG: 5 TABLET ORAL at 09:55

## 2020-08-27 RX ADMIN — OXYCODONE HYDROCHLORIDE 5 MG: 5 TABLET ORAL at 17:29

## 2020-08-27 RX ADMIN — RISPERIDONE 0.5 MG: 0.5 TABLET ORAL at 17:29

## 2020-08-27 RX ADMIN — GABAPENTIN 100 MG: 100 CAPSULE ORAL at 06:56

## 2020-08-27 RX ADMIN — Medication 400 MG: at 06:56

## 2020-08-27 RX ADMIN — Medication 400 MG: at 17:29

## 2020-08-27 RX ADMIN — GABAPENTIN 100 MG: 100 CAPSULE ORAL at 12:27

## 2020-08-27 RX ADMIN — OXYCODONE HYDROCHLORIDE 5 MG: 5 TABLET ORAL at 06:56

## 2020-08-27 RX ADMIN — DIVALPROEX SODIUM 125 MG: 125 CAPSULE ORAL at 21:13

## 2020-08-27 RX ADMIN — OXYCODONE HYDROCHLORIDE 5 MG: 5 TABLET ORAL at 21:13

## 2020-08-27 RX ADMIN — AMLODIPINE BESYLATE 5 MG: 5 TABLET ORAL at 06:57

## 2020-08-27 RX ADMIN — DIVALPROEX SODIUM 125 MG: 125 CAPSULE ORAL at 06:56

## 2020-08-27 RX ADMIN — DIVALPROEX SODIUM 125 MG: 125 CAPSULE ORAL at 14:26

## 2020-08-27 NOTE — PROGRESS NOTES
0930 Pt became very agitated and starting punching himself and the wall because he wants to go outside. Attempted to calm patient down explained why he wasn't allowed to leave the room but pt became verbally aggressive. Pulled IM Haldol but pt calmed down when I returned with the Haldol. Pt is refusing Haldol but is agreeable to oxycodone. Pt's mood today is very manic. Pt at times is laughing and cooperative to pacing/agitated speaking very fast with flight of ideas.

## 2020-08-27 NOTE — CARE PLAN
Problem: Infection  Goal: Will remain free from infection  Outcome: PROGRESSING SLOWER THAN EXPECTED  Intervention: Implement standard precautions and perform hand washing before and after patient contact  Note: Isolation Precautions:    Patient is on contact isolation for lice.    Patient educated on reason for isolation, how the infection may be transmitted, and how to help prevent transmission to others.     Patient educated that contact precautions involves staff and visitors wearing PPE, follow  Standard Precautions and perform meticulous hand hygiene in order to prevent transmission of infection. (Contact Precautions: gown and gloves; Special Contact Precautions: gown and gloves, with the added requirement of soap and water for hand hygiene; Droplet Precautions: surgical mask worn by staff and visitors in the room, and worn by the patient when out of the room; Airborne Precautions: involves staff wearing PPE to include an N95 Respirator or Controlled Air Purifying Respirator (CAPR).  Visitors should be limited and may wear an N95 mask).         Patient transport and mobilization on unit. Patient educated that they may leave their room, but prior to exiting, the patient needs to have on a fresh patient gown, ensure the potentially infectious area is covered, perform appropriate hand hygiene immediately prior to exiting the room. (*For Airborne Precautions: Patient educated that time out of the room should be minimized and limited to transport to diagnostic procedures or other activities. Patient is to wear surgical mask when required to leave the patient room).       Problem: Psychosocial Needs:  Goal: Level of anxiety will decrease  Outcome: PROGRESSING SLOWER THAN EXPECTED  Intervention: Collaborate with Interdisciplinary Team including Psychologist/Behavioral Health Team  Note: Pt unable to leave AMA. Sitter at bedside. Pt very manic today. Refusing Haldol. Pt calms down after receiving oxycodone and  calmly discussing care with the patient.

## 2020-08-27 NOTE — THERAPY
"Speech Language Pathology  Daily Treatment     Patient Name: Bola Varela  Age:  64 y.o., Sex:  male  Medical Record #: [id number]  Today's Date: 8/27/2020     Precautions  Precautions: (P) Fall Risk, Swallow Precautions ( See Comments)  Comments: poor short term memory and problem solving    Assessment    Patient was seen for dysphagia tx focused on reassessment of oropharyngeal swallow skills with therapeutic trials of Soft & Bite Sized (SB6), mixed consistencies, Regular Solids (RG7) and prescribed thin liquids via cup sip. Pt was initially easily irritated and escalated when SLP explained role and purpose of session. Pt was much more agreeable and pleasant when brought coffee and snacks. He was forgetful w/in the session and verbose, needing redirection back to task and to avoid talking while chewing. Pt did verbalize some awareness of swallow precautions, stating \"Oh, I shouldn't talk while I eat\" and then was better about talking in between bites. Mastication of chewable solids was functional with no overt s/sx of aspiration noted. Slight increase in wet vocal quality noted with successive bites and sips, though this cleared with spontaneous throat clear and reswallow. Pt appears safe to upgrade to a regular - easy to chew diet (EC7) with Thin Liquids (TN0) and monitoring during meals. Pt will continue to need 24/7 supervision due to cognitive impairments.     Plan    Upgrade diet to Regular - easy to chew diet (EC7) with Thin Liquids (TN0) and monitoring during meals. Ok to give pills whole w/ thins.  Pt will continue to need 24/7 supervision due to cognitive impairments.       Continue current treatment plan.    Discharge Recommendations: (P) Recommend post-acute placement for additional speech therapy services prior to discharge home (due to impaired cognition, needs 24/7 supervision)    Subjective    \"I wish I could go out and get some fresh air.\"     Objective     08/27/20 1115   Cognitive-Linguistic "   Level of Consciousness Alert   Sustained Attention Moderate (3)   Short Term Memory Severe (2)   Dysphagia    Positioning / Behavior Modification Modulate Rate or Bite Size;Self Monitoring;Other (see Comments)  (no talking while eating)   Other Treatments PO of TN0, SB6, RG7, mixed   Diet / Liquid Recommendation Regular - Easy to Chew (7);Thin (0)   Short Term Goals   Short Term Goal # 2 REVISED 8/20: The patient will consume MM5/TN0 without signs of aspiration given moderate cues for swallow precautions   Goal Outcome # 2  Goal met, new goal aded   Short Term Goal # 2 B  Patient will consume meals of EC7/TN0 with no s/sx of aspiration given monitoring during meals.

## 2020-08-28 PROCEDURE — A9270 NON-COVERED ITEM OR SERVICE: HCPCS | Performed by: INTERNAL MEDICINE

## 2020-08-28 PROCEDURE — A9270 NON-COVERED ITEM OR SERVICE: HCPCS | Performed by: HOSPITALIST

## 2020-08-28 PROCEDURE — A9270 NON-COVERED ITEM OR SERVICE: HCPCS | Performed by: NURSE PRACTITIONER

## 2020-08-28 PROCEDURE — 92526 ORAL FUNCTION THERAPY: CPT

## 2020-08-28 PROCEDURE — 700102 HCHG RX REV CODE 250 W/ 637 OVERRIDE(OP): Performed by: INTERNAL MEDICINE

## 2020-08-28 PROCEDURE — 700102 HCHG RX REV CODE 250 W/ 637 OVERRIDE(OP): Performed by: HOSPITALIST

## 2020-08-28 PROCEDURE — 770021 HCHG ROOM/CARE - ISO PRIVATE

## 2020-08-28 PROCEDURE — 700102 HCHG RX REV CODE 250 W/ 637 OVERRIDE(OP): Performed by: NURSE PRACTITIONER

## 2020-08-28 RX ADMIN — OXYCODONE HYDROCHLORIDE 5 MG: 5 TABLET ORAL at 08:52

## 2020-08-28 RX ADMIN — DOCUSATE SODIUM 50 MG AND SENNOSIDES 8.6 MG 2 TABLET: 8.6; 5 TABLET, FILM COATED ORAL at 17:02

## 2020-08-28 RX ADMIN — OXYCODONE HYDROCHLORIDE 5 MG: 5 TABLET ORAL at 12:24

## 2020-08-28 RX ADMIN — GABAPENTIN 100 MG: 100 CAPSULE ORAL at 12:24

## 2020-08-28 RX ADMIN — GABAPENTIN 100 MG: 100 CAPSULE ORAL at 05:00

## 2020-08-28 RX ADMIN — OXYCODONE HYDROCHLORIDE 5 MG: 5 TABLET ORAL at 17:02

## 2020-08-28 RX ADMIN — Medication 400 MG: at 17:02

## 2020-08-28 RX ADMIN — AMLODIPINE BESYLATE 5 MG: 5 TABLET ORAL at 05:00

## 2020-08-28 RX ADMIN — DIVALPROEX SODIUM 125 MG: 125 CAPSULE ORAL at 05:00

## 2020-08-28 RX ADMIN — RISPERIDONE 0.5 MG: 0.5 TABLET ORAL at 17:02

## 2020-08-28 RX ADMIN — DIVALPROEX SODIUM 125 MG: 125 CAPSULE ORAL at 12:24

## 2020-08-28 RX ADMIN — GABAPENTIN 100 MG: 100 CAPSULE ORAL at 17:02

## 2020-08-28 RX ADMIN — OXYCODONE HYDROCHLORIDE 5 MG: 5 TABLET ORAL at 05:00

## 2020-08-28 RX ADMIN — Medication 400 MG: at 05:00

## 2020-08-28 RX ADMIN — DIVALPROEX SODIUM 125 MG: 125 CAPSULE ORAL at 22:04

## 2020-08-28 RX ADMIN — RISPERIDONE 0.5 MG: 0.5 TABLET ORAL at 05:00

## 2020-08-28 RX ADMIN — OXYCODONE HYDROCHLORIDE 5 MG: 5 TABLET ORAL at 22:04

## 2020-08-28 NOTE — PROGRESS NOTES
0730: Assumed care of pt after report. Pt ambulating in room. No s/s of distress noted. A/Ox4.    1500: Pt took off dressing. Did not have aquacel, I covered with mepilex.

## 2020-08-28 NOTE — THERAPY
"Speech Language Pathology  Daily Treatment     Patient Name: Bola Varela  Age:  64 y.o., Sex:  male  Medical Record #: [id number]  Today's Date: 8/28/2020     Precautions  Precautions: Fall Risk, Swallow Precautions ( See Comments)  Comments: poor short term memory and problem solving    Assessment    Pt was seen for dysphagia tx with breakfast meal of regular - easy to chew solids and thin liquids. Pt was emotionally labile today, excited and happy initially but quick to become angry/yell with any cuing related to swallow strategies to prevent choking/aspiration. Pt was verbose, talking while chewing and coughed x3 with his meal, stating \"ugh, the syrup - what's in that syrup?\" Min verbal cues provided for pt to reduce bite size and avoid talking while eating and pt became upset, stating he felt like he was in 3rd grade and didn't want to \"be treated like shit.\" Pt was agreeable to having pancakes and scrambled eggs instead of the Greek toast and sausage laura, which he stated was like \"rubber\" and \"hard to chew.\" Pt's diet downgraded to Soft & Bite Sized (SB6) solids/Thin Liquids (TN0) with monitoring during meals. Please minimize distractions (especially opportunities for conversations) during meals as pt is at increased risk for aspiration/choking if he is given the opportunity to talk while eating.     Plan    Soft & Bite Sized (SB6) solids/Thin Liquids (TN0) with monitoring during meals. Please minimize distractions (especially opportunities for conversations) during meals as pt is at increased risk for aspiration/choking if he is given the opportunity to talk while eating.     Continue current treatment plan.    Discharge Recommendations: Recommend post-acute placement for additional speech therapy services prior to discharge home (24/7 supervision needed d/t cognition)    Subjective    \"This is like rubber. It's hard to chew.\"  \"I'm not gonna choke!\"     Objective       08/28/20 0806   Dysphagia  "   Positioning / Behavior Modification Modulate Rate or Bite Size;Self Monitoring;Other (see Comments)  (no talking while eating)   Other Treatments meal tx EC7/TN0   Diet / Liquid Recommendation Soft & Bite-Sized (6) - (Dysphagia III);Thin (0)   Recommended Route of Medication Administration   Medication Administration  Whole with Liquid Wash   Short Term Goals   Short Term Goal # 2 New 8/28: Patient will consume meals of SB6/TN0 with no s/sx of aspiration given monitoring during meals.   Short Term Goal # 2 B  Patient will consume meals of EC7/TN0 with no s/sx of aspiration given monitoring during meals.   Goal Outcome  # 2 B Goal not met

## 2020-08-28 NOTE — CARE PLAN
Problem: Safety  Goal: Will remain free from falls  Outcome: PROGRESSING AS EXPECTED     Problem: Infection  Goal: Will remain free from infection  Outcome: PROGRESSING AS EXPECTED  Intervention: Assess signs and symptoms of infection  Note: Monitor for s/s of infection, notify MD for changes from baseline     Problem: Knowledge Deficit  Goal: Knowledge of disease process/condition, treatment plan, diagnostic tests, and medications will improve  Outcome: PROGRESSING AS EXPECTED  Intervention: Assess knowledge level of disease process/condition, treatment plan, diagnostic tests, and medications  Note: Educate and assess knowledge about treatments and procedures

## 2020-08-29 PROCEDURE — 700102 HCHG RX REV CODE 250 W/ 637 OVERRIDE(OP): Performed by: HOSPITALIST

## 2020-08-29 PROCEDURE — 770021 HCHG ROOM/CARE - ISO PRIVATE

## 2020-08-29 PROCEDURE — A9270 NON-COVERED ITEM OR SERVICE: HCPCS | Performed by: INTERNAL MEDICINE

## 2020-08-29 PROCEDURE — 700102 HCHG RX REV CODE 250 W/ 637 OVERRIDE(OP): Performed by: INTERNAL MEDICINE

## 2020-08-29 PROCEDURE — A9270 NON-COVERED ITEM OR SERVICE: HCPCS | Performed by: NURSE PRACTITIONER

## 2020-08-29 PROCEDURE — 700102 HCHG RX REV CODE 250 W/ 637 OVERRIDE(OP): Performed by: NURSE PRACTITIONER

## 2020-08-29 PROCEDURE — A9270 NON-COVERED ITEM OR SERVICE: HCPCS | Performed by: HOSPITALIST

## 2020-08-29 RX ADMIN — RISPERIDONE 0.5 MG: 0.5 TABLET ORAL at 08:08

## 2020-08-29 RX ADMIN — GABAPENTIN 100 MG: 100 CAPSULE ORAL at 08:08

## 2020-08-29 RX ADMIN — DOCUSATE SODIUM 50 MG AND SENNOSIDES 8.6 MG 2 TABLET: 8.6; 5 TABLET, FILM COATED ORAL at 08:08

## 2020-08-29 RX ADMIN — GABAPENTIN 100 MG: 100 CAPSULE ORAL at 11:39

## 2020-08-29 RX ADMIN — OXYCODONE HYDROCHLORIDE 5 MG: 5 TABLET ORAL at 08:09

## 2020-08-29 RX ADMIN — Medication 400 MG: at 16:54

## 2020-08-29 RX ADMIN — DOCUSATE SODIUM 50 MG AND SENNOSIDES 8.6 MG 2 TABLET: 8.6; 5 TABLET, FILM COATED ORAL at 16:54

## 2020-08-29 RX ADMIN — OXYCODONE HYDROCHLORIDE 5 MG: 5 TABLET ORAL at 22:14

## 2020-08-29 RX ADMIN — OXYCODONE HYDROCHLORIDE 5 MG: 5 TABLET ORAL at 11:39

## 2020-08-29 RX ADMIN — GABAPENTIN 100 MG: 100 CAPSULE ORAL at 16:54

## 2020-08-29 RX ADMIN — DIVALPROEX SODIUM 125 MG: 125 CAPSULE ORAL at 15:22

## 2020-08-29 RX ADMIN — Medication 400 MG: at 08:08

## 2020-08-29 RX ADMIN — DIVALPROEX SODIUM 125 MG: 125 CAPSULE ORAL at 22:14

## 2020-08-29 RX ADMIN — DIVALPROEX SODIUM 125 MG: 125 CAPSULE ORAL at 08:08

## 2020-08-29 RX ADMIN — ACETAMINOPHEN 650 MG: 325 TABLET, FILM COATED ORAL at 18:37

## 2020-08-29 RX ADMIN — OXYCODONE HYDROCHLORIDE 5 MG: 5 TABLET ORAL at 15:22

## 2020-08-29 RX ADMIN — RISPERIDONE 0.5 MG: 0.5 TABLET ORAL at 16:54

## 2020-08-29 RX ADMIN — AMLODIPINE BESYLATE 5 MG: 5 TABLET ORAL at 08:08

## 2020-08-29 RX ADMIN — OXYCODONE HYDROCHLORIDE 5 MG: 5 TABLET ORAL at 18:37

## 2020-08-29 NOTE — CARE PLAN
Problem: Venous Thromboembolism (VTW)/Deep Vein Thrombosis (DVT) Prevention:  Goal: Patient will participate in Venous Thrombosis (VTE)/Deep Vein Thrombosis (DVT)Prevention Measures  Outcome: PROGRESSING AS EXPECTED  Lovenox in use     Problem: Pain Management  Goal: Pain level will decrease to patient's comfort goal  Outcome: PROGRESSING AS EXPECTED   PRN pain meds in use

## 2020-08-29 NOTE — PROGRESS NOTES
"Security called because patient was yelling at sitter and RN regarding wanting to go outside for \"fresh air\".  Patient threw his sunglasses to the ground and shouted.  Patient called down within a few minutes.  Security to continue to check in throughout the day.  "

## 2020-08-29 NOTE — PROGRESS NOTES
Received report and assumed pt care.  A&O x4.  Bed alarm and treaded socks on.  Call light and personal belongings within reach.  Bed locked and at lowest position.  YAJAIRA.  ROSSYS.  Contact Isolation.  1:1 sitter.

## 2020-08-29 NOTE — PROGRESS NOTES
"Hospital Medicine Bi Weekly Progress Note    Date of Service  8/29/2020    Chief Complaint  LLE wound    Hospital Course   Mr. Varela is a 64-year-old male with PMH significant for homelessness, etoh use, tobacco dependence and chronic LLE wound who presented 8/7/2020 with LLE wound infection.  He was hospitalized at our facility in April and evaluated by orthopedic surgery who recommended wound care.  Wound cultures at that time grew MSSA and strep.  Patient reported losing his Medicaid card and having no transportation to wound clinic.  He was seen at [facility] earlier this week and prescribed ABX, however he has not filled the prescription.  US LLE negative for DVT.  Treated for sepsis with empiric ABX and wound care.  He was found to have head lice and underwent treatments. Continued to have intermittent agitation so was started on risperdol, depakote and gabapentin per psych recs.  He has been deemed incapacitated to make medical decisions and is currently pending guardianship and placement.  He is medically stable and will be only be evaluated 2 times weekly unless there is a clinical change.          Interval Problem Update  - Patient walking around room, in pleasant mood. Patient states he did not sleep well. Is currently watching a television show about a dog and chickens. Patient unable to answer orientation questions, thinks he is in a \"kick back place\" hanging out and not a hospital.  Guardianship process initiated per case management.   - VSS  - Last blood work on 8/27- reviewed, LFT within normal limits  - PE and ROS unchanged from previous exams    Consultants/Specialty  Psychiatry    Code Status  Full Code    Disposition  Pursuing guardianship and placement        Assessment/Plan  Open wound of left lower leg- (present on admission)  Assessment & Plan  -chronic  -poor compliance with OP wound care. He can't remember the last time he had his dressing changed OP  -wound care  -Previous " cultures grew MSSA and group A strep.  Wound cultures from this hospitalization also growing MSSA and group A strep  -blood cultures (-) to date  -afebrile. Leukocytosis resolved  -continue ABX unasyn 8/7-8/11 -->augmentin 8/11, end date 8/21  -LLE US (-) DVT    Hypomagnesemia  Assessment & Plan  -Likely related to EtOH abuse history  -Resolved    Hypokalemia- (present on admission)  Assessment & Plan  -Also with hypomagnesemia  -Resolved after replacement    Flexion contracture of knee, 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)  Assessment & Plan  -secondary to chronic wound in that area  -PT OT  -encourage mobility      Head lice  Assessment & Plan  Head lice found  S/p first round of treatment 8/10, treated again on 8/13 given active lice seen by CNA, will need second treatment 8/20  If still having issues will likely need to shave patient    Encephalopathy- (present on admission)  Assessment & Plan  -acute on chronic  -likely due to significant EtOH history.  Possible component of Warnicke's encephalopathy  -Psychiatry evaluated him and deemed him incapacitated to make medical decision or leave AMA and recommend SLP cognitive eval.  Patient will likely require guardianship and placement  -CT head unremarkable  -no acute neurological deficits  -Avoid narcotics and hypnotics.  Cautious use of benzodiazepines for alcohol withdrawal protocol  -Frequent reorientation  -Sleep hygiene      Non-compliance  Assessment & Plan  -likely also component of psychiatric disorder and ETOH abuse  -Psychiatric consulted and suggests patient is incapacitated to make medical decisions or leave AMA  -ongoing encouragement for compliance.    Psychiatric disorder  Assessment & Plan  -unknown/unspecified  -not currently on medication  -history of severe alcohol dependence.  -psychiatry consulted for agitation - incapacitated to leave AMA or make medical decisions, appreciate recs: risperdol 0.5mg BID, added depakote 125mg TID (8/15/20, watch LFTs),  "added neurotin 100mg TID 8/16  -Pending guardianship.       Alcohol dependence (HCC)- (present on admission)  Assessment & Plan  -history of.  Ongoing.  He reports drinking \"once in a while\"  -s/p WD, was on CIWA, uncomplicated  -MVI and thiamine    Protein malnutrition (HCC)  Assessment & Plan  -history of ETOH and homelessness  -dietary consult  -TID supplements  -encourage PO intake    Sepsis (HCC)- (present on admission)  Assessment & Plan  -This is Sepsis Present on admission  SIRS criteria identified on my evaluation include: Leukocytosis, with WBC greater than 12,000   Source is cellulitis and wound infection  Sepsis protocol initiated  Fluid resuscitation ordered per protocol  IV antibiotics as appropriate for source of sepsis  While organ dysfunction may be noted elsewhere in this problem list or in the chart, degree of organ dysfunction does not meet CMS criteria for severe sepsis  -afebrile  -lactic WNL  -blood cultures negative to date  -Sepsis has resolved        Tobacco dependence- (present on admission)  Assessment & Plan  -Nicotine replacement protocol         VTE prophylaxis: Enoxaparin            "

## 2020-08-30 PROCEDURE — A9270 NON-COVERED ITEM OR SERVICE: HCPCS | Performed by: NURSE PRACTITIONER

## 2020-08-30 PROCEDURE — 700102 HCHG RX REV CODE 250 W/ 637 OVERRIDE(OP): Performed by: INTERNAL MEDICINE

## 2020-08-30 PROCEDURE — 770021 HCHG ROOM/CARE - ISO PRIVATE

## 2020-08-30 PROCEDURE — A9270 NON-COVERED ITEM OR SERVICE: HCPCS | Performed by: INTERNAL MEDICINE

## 2020-08-30 PROCEDURE — 700102 HCHG RX REV CODE 250 W/ 637 OVERRIDE(OP): Performed by: HOSPITALIST

## 2020-08-30 PROCEDURE — A9270 NON-COVERED ITEM OR SERVICE: HCPCS | Performed by: HOSPITALIST

## 2020-08-30 PROCEDURE — 700102 HCHG RX REV CODE 250 W/ 637 OVERRIDE(OP): Performed by: NURSE PRACTITIONER

## 2020-08-30 RX ADMIN — RISPERIDONE 0.5 MG: 0.5 TABLET ORAL at 04:58

## 2020-08-30 RX ADMIN — ACETAMINOPHEN 650 MG: 325 TABLET, FILM COATED ORAL at 11:06

## 2020-08-30 RX ADMIN — OXYCODONE HYDROCHLORIDE 5 MG: 5 TABLET ORAL at 18:01

## 2020-08-30 RX ADMIN — Medication 400 MG: at 18:01

## 2020-08-30 RX ADMIN — DOCUSATE SODIUM 50 MG AND SENNOSIDES 8.6 MG 2 TABLET: 8.6; 5 TABLET, FILM COATED ORAL at 05:00

## 2020-08-30 RX ADMIN — ACETAMINOPHEN 650 MG: 325 TABLET, FILM COATED ORAL at 18:01

## 2020-08-30 RX ADMIN — Medication 400 MG: at 04:58

## 2020-08-30 RX ADMIN — DIVALPROEX SODIUM 125 MG: 125 CAPSULE ORAL at 13:27

## 2020-08-30 RX ADMIN — OXYCODONE HYDROCHLORIDE 5 MG: 5 TABLET ORAL at 11:06

## 2020-08-30 RX ADMIN — AMLODIPINE BESYLATE 5 MG: 5 TABLET ORAL at 05:07

## 2020-08-30 RX ADMIN — GABAPENTIN 100 MG: 100 CAPSULE ORAL at 13:27

## 2020-08-30 RX ADMIN — DIVALPROEX SODIUM 125 MG: 125 CAPSULE ORAL at 22:14

## 2020-08-30 RX ADMIN — OXYCODONE HYDROCHLORIDE 5 MG: 5 TABLET ORAL at 15:16

## 2020-08-30 RX ADMIN — GABAPENTIN 100 MG: 100 CAPSULE ORAL at 04:58

## 2020-08-30 RX ADMIN — GABAPENTIN 100 MG: 100 CAPSULE ORAL at 18:01

## 2020-08-30 RX ADMIN — DIVALPROEX SODIUM 125 MG: 125 CAPSULE ORAL at 04:58

## 2020-08-30 RX ADMIN — OXYCODONE HYDROCHLORIDE 5 MG: 5 TABLET ORAL at 04:58

## 2020-08-30 RX ADMIN — OXYCODONE HYDROCHLORIDE 5 MG: 5 TABLET ORAL at 22:14

## 2020-08-30 RX ADMIN — RISPERIDONE 0.5 MG: 0.5 TABLET ORAL at 18:01

## 2020-08-30 NOTE — CARE PLAN
Problem: Safety  Goal: Will remain free from falls  Outcome: PROGRESSING AS EXPECTED  Intervention: Assess risk factors for falls  Flowsheets (Taken 8/29/2020 2575)  Mobility Status Assessment: 0-Ambulates & Transfers Independently. No Assistance Required  Note: Patient ambulates in room ad erika with steady gait.     Problem: Bowel/Gastric:  Goal: Normal bowel function is maintained or improved  Outcome: PROGRESSING AS EXPECTED  Intervention: Educate patient and significant other/support system about signs and symptoms of constipation and interventions to implement  Note: Per patient, no problems with bowel motility.

## 2020-08-30 NOTE — PROGRESS NOTES
Received report and assumed pt care.  A&O x4.  Bed alarm and treaded socks on.  Call light and personal belongings within reach.  Bed locked and at lowest position.  GATES.  VSS.  Contact Isolation.  1:1 safety sitter at bedside.

## 2020-08-30 NOTE — CARE PLAN
Problem: Infection  Goal: Will remain free from infection  Outcome: PROGRESSING AS EXPECTED     Problem: Pain Management  Goal: Pain level will decrease to patient's comfort goal  Outcome: PROGRESSING AS EXPECTED   PRN pain meds in use

## 2020-08-31 PROCEDURE — A9270 NON-COVERED ITEM OR SERVICE: HCPCS | Performed by: HOSPITALIST

## 2020-08-31 PROCEDURE — 700102 HCHG RX REV CODE 250 W/ 637 OVERRIDE(OP): Performed by: NURSE PRACTITIONER

## 2020-08-31 PROCEDURE — 770021 HCHG ROOM/CARE - ISO PRIVATE

## 2020-08-31 PROCEDURE — A9270 NON-COVERED ITEM OR SERVICE: HCPCS | Performed by: INTERNAL MEDICINE

## 2020-08-31 PROCEDURE — A9270 NON-COVERED ITEM OR SERVICE: HCPCS | Performed by: NURSE PRACTITIONER

## 2020-08-31 PROCEDURE — 700102 HCHG RX REV CODE 250 W/ 637 OVERRIDE(OP): Performed by: INTERNAL MEDICINE

## 2020-08-31 PROCEDURE — 700102 HCHG RX REV CODE 250 W/ 637 OVERRIDE(OP): Performed by: HOSPITALIST

## 2020-08-31 RX ADMIN — ACETAMINOPHEN 650 MG: 325 TABLET, FILM COATED ORAL at 11:48

## 2020-08-31 RX ADMIN — Medication 400 MG: at 17:18

## 2020-08-31 RX ADMIN — GABAPENTIN 100 MG: 100 CAPSULE ORAL at 17:18

## 2020-08-31 RX ADMIN — OXYCODONE HYDROCHLORIDE 5 MG: 5 TABLET ORAL at 20:30

## 2020-08-31 RX ADMIN — Medication 400 MG: at 05:17

## 2020-08-31 RX ADMIN — OXYCODONE HYDROCHLORIDE 5 MG: 5 TABLET ORAL at 11:48

## 2020-08-31 RX ADMIN — AMLODIPINE BESYLATE 5 MG: 5 TABLET ORAL at 05:18

## 2020-08-31 RX ADMIN — DOCUSATE SODIUM 50 MG AND SENNOSIDES 8.6 MG 2 TABLET: 8.6; 5 TABLET, FILM COATED ORAL at 05:17

## 2020-08-31 RX ADMIN — RISPERIDONE 0.5 MG: 0.5 TABLET ORAL at 05:18

## 2020-08-31 RX ADMIN — DIVALPROEX SODIUM 125 MG: 125 CAPSULE ORAL at 05:17

## 2020-08-31 RX ADMIN — GABAPENTIN 100 MG: 100 CAPSULE ORAL at 11:48

## 2020-08-31 RX ADMIN — GABAPENTIN 100 MG: 100 CAPSULE ORAL at 05:18

## 2020-08-31 RX ADMIN — DIVALPROEX SODIUM 125 MG: 125 CAPSULE ORAL at 20:20

## 2020-08-31 RX ADMIN — DIVALPROEX SODIUM 125 MG: 125 CAPSULE ORAL at 17:18

## 2020-08-31 RX ADMIN — OXYCODONE HYDROCHLORIDE 5 MG: 5 TABLET ORAL at 05:15

## 2020-08-31 RX ADMIN — DOCUSATE SODIUM 50 MG AND SENNOSIDES 8.6 MG 2 TABLET: 8.6; 5 TABLET, FILM COATED ORAL at 17:18

## 2020-08-31 RX ADMIN — OXYCODONE HYDROCHLORIDE 5 MG: 5 TABLET ORAL at 17:18

## 2020-08-31 RX ADMIN — ACETAMINOPHEN 650 MG: 325 TABLET, FILM COATED ORAL at 05:17

## 2020-08-31 RX ADMIN — RISPERIDONE 0.5 MG: 0.5 TABLET ORAL at 17:18

## 2020-08-31 NOTE — CARE PLAN
Problem: Pain Management  Goal: Pain level will decrease to patient's comfort goal  Outcome: PROGRESSING AS EXPECTED   PRN pain meds in use    Problem: Mobility  Goal: Risk for activity intolerance will decrease  Outcome: PROGRESSING AS EXPECTED

## 2020-08-31 NOTE — PROGRESS NOTES
Received report and assumed pt care.  A&O x2-3, intermittently disoriented to time and place.  Treaded socks on.  Call light and personal belongings within reach.  Bed locked and at lowest position.  GATES.  VSS.  Contact isolation.  1:1 safety sitter at bedside.

## 2020-09-01 PROCEDURE — 87205 SMEAR GRAM STAIN: CPT

## 2020-09-01 PROCEDURE — 11045 DBRDMT SUBQ TISS EACH ADDL: CPT | Performed by: NURSE PRACTITIONER

## 2020-09-01 PROCEDURE — A9270 NON-COVERED ITEM OR SERVICE: HCPCS | Performed by: HOSPITALIST

## 2020-09-01 PROCEDURE — 0JBP0ZZ EXCISION OF LEFT LOWER LEG SUBCUTANEOUS TISSUE AND FASCIA, OPEN APPROACH: ICD-10-PCS | Performed by: ORTHOPAEDIC SURGERY

## 2020-09-01 PROCEDURE — 700102 HCHG RX REV CODE 250 W/ 637 OVERRIDE(OP): Performed by: NURSE PRACTITIONER

## 2020-09-01 PROCEDURE — 87070 CULTURE OTHR SPECIMN AEROBIC: CPT

## 2020-09-01 PROCEDURE — 87186 SC STD MICRODIL/AGAR DIL: CPT

## 2020-09-01 PROCEDURE — A9270 NON-COVERED ITEM OR SERVICE: HCPCS | Performed by: NURSE PRACTITIONER

## 2020-09-01 PROCEDURE — 770021 HCHG ROOM/CARE - ISO PRIVATE

## 2020-09-01 PROCEDURE — 700102 HCHG RX REV CODE 250 W/ 637 OVERRIDE(OP): Performed by: INTERNAL MEDICINE

## 2020-09-01 PROCEDURE — A9270 NON-COVERED ITEM OR SERVICE: HCPCS | Performed by: INTERNAL MEDICINE

## 2020-09-01 PROCEDURE — 700111 HCHG RX REV CODE 636 W/ 250 OVERRIDE (IP): Performed by: HOSPITALIST

## 2020-09-01 PROCEDURE — 700102 HCHG RX REV CODE 250 W/ 637 OVERRIDE(OP): Performed by: HOSPITALIST

## 2020-09-01 PROCEDURE — 700101 HCHG RX REV CODE 250: Performed by: NURSE PRACTITIONER

## 2020-09-01 PROCEDURE — 11042 DBRDMT SUBQ TIS 1ST 20SQCM/<: CPT | Performed by: NURSE PRACTITIONER

## 2020-09-01 PROCEDURE — 87077 CULTURE AEROBIC IDENTIFY: CPT | Mod: 91

## 2020-09-01 RX ORDER — LIDOCAINE HYDROCHLORIDE AND EPINEPHRINE BITARTRATE 20; .01 MG/ML; MG/ML
20 INJECTION, SOLUTION SUBCUTANEOUS ONCE
Status: COMPLETED | OUTPATIENT
Start: 2020-09-01 | End: 2020-09-01

## 2020-09-01 RX ADMIN — OXYCODONE HYDROCHLORIDE 5 MG: 5 TABLET ORAL at 04:43

## 2020-09-01 RX ADMIN — GABAPENTIN 100 MG: 100 CAPSULE ORAL at 17:32

## 2020-09-01 RX ADMIN — OXYCODONE HYDROCHLORIDE 5 MG: 5 TABLET ORAL at 13:04

## 2020-09-01 RX ADMIN — AMLODIPINE BESYLATE 5 MG: 5 TABLET ORAL at 04:43

## 2020-09-01 RX ADMIN — GABAPENTIN 100 MG: 100 CAPSULE ORAL at 04:43

## 2020-09-01 RX ADMIN — DIVALPROEX SODIUM 125 MG: 125 CAPSULE ORAL at 13:03

## 2020-09-01 RX ADMIN — OXYCODONE HYDROCHLORIDE 5 MG: 5 TABLET ORAL at 17:32

## 2020-09-01 RX ADMIN — RISPERIDONE 0.5 MG: 0.5 TABLET ORAL at 04:43

## 2020-09-01 RX ADMIN — DOCUSATE SODIUM 50 MG AND SENNOSIDES 8.6 MG 2 TABLET: 8.6; 5 TABLET, FILM COATED ORAL at 04:43

## 2020-09-01 RX ADMIN — GABAPENTIN 100 MG: 100 CAPSULE ORAL at 13:03

## 2020-09-01 RX ADMIN — Medication 400 MG: at 17:32

## 2020-09-01 RX ADMIN — Medication 400 MG: at 04:43

## 2020-09-01 RX ADMIN — DIVALPROEX SODIUM 125 MG: 125 CAPSULE ORAL at 20:51

## 2020-09-01 RX ADMIN — RISPERIDONE 0.5 MG: 0.5 TABLET ORAL at 17:32

## 2020-09-01 RX ADMIN — DOCUSATE SODIUM 50 MG AND SENNOSIDES 8.6 MG 2 TABLET: 8.6; 5 TABLET, FILM COATED ORAL at 17:31

## 2020-09-01 RX ADMIN — OXYCODONE HYDROCHLORIDE 5 MG: 5 TABLET ORAL at 20:51

## 2020-09-01 RX ADMIN — LIDOCAINE HYDROCHLORIDE AND EPINEPHRINE 20 ML: 20; 10 INJECTION, SOLUTION INFILTRATION; PERINEURAL at 13:34

## 2020-09-01 RX ADMIN — OXYCODONE HYDROCHLORIDE 5 MG: 5 TABLET ORAL at 08:56

## 2020-09-01 RX ADMIN — DIVALPROEX SODIUM 125 MG: 125 CAPSULE ORAL at 04:43

## 2020-09-01 RX ADMIN — ENOXAPARIN SODIUM 40 MG: 40 INJECTION SUBCUTANEOUS at 04:43

## 2020-09-01 NOTE — WOUND TEAM
[facility]  Inpatient Services  Wound and Skin Care Progress Note    HPI, PMH, SH: Reviewed    Unit where seen by Wound Team: [id number]     WOUND CONSULT RELATED TO:  Left leg wound    Self Report / Pain Level:  Patient would not give a number. Sharp debridement caused pain despite premed with PO pain med, topical and injectable lidocaine. Patient tolerated debridement and wound care well, with good attitude and compliant.        OBJECTIVE:  Wound was open to air.     WOUND TYPE, LOCATION, CHARACTERISTICS (Pressure Injuries: location, stage, POA or date identified)          Wound 08/07/20 Full Thickness Wound Leg LLE posterior, extends medially and laterally (Active)   Wound Image     09/01/20 1300   Site Assessment Red;Bleeding 09/01/20 1300   Periwound Assessment Scar tissue;Red 09/01/20 1300   Margins Attached edges;Defined edges 09/01/20 1300   Closure Adhesive bandage 09/01/20 1300   Drainage Amount Large 09/01/20 1300   Drainage Description Sanguineous 09/01/20 1300   Treatments Cleansed;Provider debridement;Site care;Injectible Lidocaine;Topical Lidocaine;Direct pressure 09/01/20 1300   Wound Cleansing Approved Wound Cleanser 09/01/20 1300   Periwound Protectant Not Applicable 09/01/20 1300   Dressing Cleansing/Solutions Not Applicable 09/01/20 1300   Dressing Options Collagen Dressing;Hydrofiber Silver;Absorbent Abdominal Pad;Dry Roll Gauze;Tubigrip 09/01/20 1300   Dressing Changed New 09/01/20 1300   Dressing Status Clean;Dry;Intact;New drainage 09/01/20 1300   Dressing Change/Treatment Frequency Every 48 hrs, and As Needed 09/01/20 1300   NEXT Dressing Change/Treatment Date 09/03/20 09/01/20 1300   NEXT Weekly Photo (Inpatient Only) 09/08/20 09/01/20 1300   Non-staged Wound Description Full thickness 09/01/20 1300   Wound Length (cm) 42 cm 09/01/20 1300   Wound Width (cm) 15 cm 09/01/20 1300   Wound Depth (cm) 0.2 cm 09/01/20 1300   Wound Surface Area (cm^2) 630 cm^2 09/01/20 1300   Wound  Volume (cm^3) 126 cm^3 09/01/20 1300   Post-Procedure Length (cm) 42 cm 09/01/20 1300   Post-Procedure Width (cm) 16 cm 09/01/20 1300   Post-Procedure Depth (cm) 0.5 cm 09/01/20 1300   Post-Procedure Surface Area (cm^2) 672 cm^2 09/01/20 1300   Post-Procedure Volume (cm^3) 336 cm^3 09/01/20 1300   Wound Healing % -139 09/01/20 1300   Wound Bed Granulation (%) 0 % 09/01/20 1300   Wound Bed Epithelium (%) 0 % 09/01/20 1300   Wound Bed Slough (%) 60 % 09/01/20 1300   Wound Bed Granulation (%) - Post-Procedure 0 % 09/01/20 1300   Wound Bed Epithelium % - (Post-Procedure) 0 % 09/01/20 1300   Wound Bed Slough % - (Post-Procedure) 0 % 09/01/20 1300   Wound Bed Eschar % - (Post-Procedure) 0 % 09/01/20 1300   Tunneling (cm) 0 cm 09/01/20 1300   Undermining (cm) 0 cm 09/01/20 1300   Shape irregular, elongated 09/01/20 1300   Wound Odor Mild 09/01/20 1300   Pulses Left;2+;DP;PT 08/26/20 0900   Exposed Structures None 09/01/20 1300     Vascular:    Dorsal Pedal pulses:  palpated by LPS ARPN  Posterior tib pulses:   see LPS note    KOSTAS:      ordered by LPS APRN    Lab Values:    Lab Results   Component Value Date/Time    WBC 6.6 08/09/2020 01:22 AM    RBC 3.48 (L) 08/09/2020 01:22 AM    HEMOGLOBIN 10.4 (L) 08/09/2020 01:22 AM    HEMATOCRIT 31.7 (L) 08/09/2020 01:22 AM                    Lab Results   Component Value Date/Time    HBA1C 5.7 (H) 11/09/2018 06:30 PM           Culture:   Obtained yes today, Results show pending    INTERVENTIONS BY WOUND TEAM:  Patient laid on bed, on his stomach which exposed most of the wound. Wound cleaned with wound cleanser and dry gauze 4 x 4s, then photos and measurements taken. Lidocaine gel applied to wound and periwound, gauze 4 x 4s laid over the lidocaine to keep it from dripping off wound. That was allowed to sit on wound for 15 minutes, then was wiped off. LPS APRN performed sharp debridement, please read Limb Preservation Service (LPS) note from today. After debridement wound and  periwound cleaned again with wound cleanser and gauze, post debridement photos and measurements taken.  Promogran Vanessa moistened with NS and placed over bleeding areas in wound bed, then hydrofiber silver applied over the wound. Abdominal pads placed over that, secured with roll gauze and then tubi  F placed over dressing, from midfoot to thigh. Dressing Care order placed for nursing to check dressing and make sure it is intact, reinforce PRN, and to notify Wound Team if patient removes dressing. Per LPS ARPN Wound Team to place Negative Pressure Wound Therapy (wound vac) on Thursday with tubi  to thigh and 2 layer compression wrap to left lower leg pending KOSTAS and wound culture results.    Interdisciplinary consultation: Patient, Bedside RN, Asaf ELLER with LPS.    EVALUATION: Promogran Vanessa collagen dressing has clotting properties, silver for antimicrobial, and provides collagen for wound stalled in the inflammatory phase of wound healing. Gives MMPs cells of inflammatory phase collagen to attack while the collagen the body makes for wound healing can work on remodeling wound. hydrofiber silver for absorption of exudate without laterally wicking to periwound, the silver in it is antimicrobial for wound with suspected infection. Abdominal pads for absorption and protection. Tubi  F for mild compression (patient could not tolerate size E) to help reduce edema.    Goals: manage drainage and protect wounds, place NPWT Thursday, Steady decrease in wound area and depth weekly.     NURSING PLAN OF CARE ORDERS (X):  Dressing changes: See Dressing Care orders: X  Skin care: See Skin Care orders: NA  Rectal tube care: See Rectal Tube Care orders:        Other orders:                             WOUND TEAM PLAN OF CARE (X):   Dressing changes by wound team:          Follow up 1-2 times weekly:   X next follow up on 09/03/2020            Follow up 3 times weekly:                NPWT change 3 times  weekly:     Follow up as needed:       Other (explain):     Anticipated discharge plans (X): Pending guardianship   LTACH:        SNF/Rehab:                  Home Care:           Outpatient Wound Center:            Self Care:            Other:

## 2020-09-01 NOTE — CARE PLAN
Problem: Safety  Goal: Will remain free from falls  Outcome: PROGRESSING AS EXPECTED  Note: Non-slip socks are on, bed is locked and in lowest position, personal belongings are within reach, room is free of clutter and spills, call light is in place. Patient educated on how to use the call light and how to call for assistance. Patient verbalized understanding.       Problem: Infection  Goal: Will remain free from infection  Outcome: PROGRESSING AS EXPECTED  Note: Standard precautions in place. Cleansed hands before and after patient care to prevent the spread of germs.

## 2020-09-01 NOTE — CARE PLAN
Problem: Infection  Goal: Will remain free from infection  Outcome: PROGRESSING AS EXPECTED   Standard precautions in place, patient educated on proper hand washing, nutrition promoted, IS used   Problem: Venous Thromboembolism (VTW)/Deep Vein Thrombosis (DVT) Prevention:  Goal: Patient will participate in Venous Thrombosis (VTE)/Deep Vein Thrombosis (DVT)Prevention Measures  Outcome: PROGRESSING AS EXPECTED   Pt is ambulatory, refuses scds, education provided

## 2020-09-01 NOTE — PROCEDURES
"PROCEDURE NOTE            HPI: 64-year-old male homelessness, EtOH use, tobacco dependence, chronic left lower extremity wound admitted 8/7/2020 with left lower extremity wound infection.  Patient was recently hospitalized at [facility] in April 2020, evaluated by orthopedic surgery who recommended wound care.  Wound culture grew MSSA and strep.  Patient was recently seen at [facility] prior to this admission was given a prescription for antibiotics but did not fill this.  Patient reports that he has had this wound for 8 to 10 years.  He reports that he fell and went \"ass over tea kettle\".  He reports that he would clean the wound almost daily with soap and water at the homeless shelter.  Per chart review, patient reported he developed the ulcer in May 2018 when he fell while hiking.  Patient was seen at wound care clinic in August 2018.  Patient had a  assisting him while he was living at [facility].  Wound VAC was ordered however  had concerns with patient's compliancy and access to medical care.  Skilled nursing facility was contacted to have patient be admitted, however he was not accepted due to Medicaid.    Patient is on Lovenox 40 mg daily.  Dose was given today however he refused Lovenox for at least the last week.  Allergies reviewed.  He denies any allergies.    Results:  8/7/2020: X-ray tib-fib left:  1.  Healed distal metaphyseal fractures of the left fibula and tibia with tibial IM layla in place.     2.  No acute fracture or dislocation.     3.  No radiopaque soft tissue foreign body.      No arterial studies noted        Exam:  Forgetful with short term events, nervous, anxious.  Repeatedly asked where he was at and if the room he was in was his room.  Palpable PT pulse to left foot +1   +2 DP left foot  Difficulty palpating popliteal due to scar tissue  Significant scar tissue with rolled wound edges primarily to popliteal fossa  100% slough with fibrous tissue  There is a " mild to moderate odor present  Heavy yellow drainage  There is minimal erythema with moderate edema, nonpitting  Able to extend left leg to 170 degrees. Able to flex left leg around 80 degrees            The procedure, rationale for doing so, and the possible risks- including infection, pain and bleeding- have been discussed with the patient.  The patient  verbalized understanding and is agreeable with proceeding.  Consent signed.       DESCRIPTION OF PROCEDURE:  Excision of skin, subcutaneous tissue including chronic rolled wound edges to left lower leg ulcer    PMH, medications and recent labs reviewed    ANESTHESIA:  6 ml 2% lidocaine with epinephrine    PROCEDURE: A formal timeout was performed to confirm patient's correct name, correct site, correct procedure and correct laterality.  Skin prepped with Chlorahexidine.  Lidocaine injected subcutaneously into several sites around the wound margin.    -Curette, scalpel and forceps used to excise closed wound edges, scar tissue, fibrinous tissue, slough and senescent red tissue from the wound bed.  Tissue easily friable to the medial calf aspect of the ulcer.  Total area debrided, 672 cm².  Debridement carried into the subcutaneous tissue layer.   -Bleeding controlled with manual pressure.    Wound care completed by wound RN - refer to flowsheet and note  -Patient tolerated the procedure well, without significant c/o pain or discomfort.      Pre-debridement measurements: 42 x 15 x 0.2 cm  Post debridement measurements: 42 x 16 x 0.5 cm        Pre-debridement left lower leg medial aspect      Pre-debridement left lower leg posterior aspect          Post debridement left lower leg      Post debridement left lower leg posterior aspect      Post debridement left lower leg medial aspect            ASSESSMENT/PLAN:  64-year-old male with homelessness, EtOH use, tobacco use, and chronic left leg ulcer.  S/P bedside excisional debridement performed today.  Medically  necessary to promote wound healing.      -Wound culture collected.  Follow.  Patient currently not on antibiotics  - Wound care: Vanessa, Aquacel Ag, ABD pad, roll gauze, Tubigrip.  Wound team to manage dressings.  Tubigrip applied for mild compression to reduce edema   - Arterial studies ordered to guide compression therapy.    -Hold 2 layer compression wrap until wound culture results are available and patient is being adequately treated if positive  - Patient to continue to be seen by wound care team while admitted  -Plan for veraflo wound VAC placement followed by 2 layer compression wrap on Thursday.  Patient to continue to be followed by wound care nurse practitioner as he will require serial excisional debridements  -IP consult for foot nail care      Discharge plan:  Pending guardianship

## 2020-09-02 ENCOUNTER — APPOINTMENT (OUTPATIENT)
Dept: RADIOLOGY | Facility: MEDICAL CENTER | Age: 65
DRG: 853 | End: 2020-09-02
Attending: NURSE PRACTITIONER
Payer: MEDICARE

## 2020-09-02 PROBLEM — J43.9 EMPHYSEMA/COPD (HCC): Status: ACTIVE | Noted: 2020-09-02

## 2020-09-02 LAB
GRAM STN SPEC: NORMAL
SIGNIFICANT IND 70042: NORMAL
SITE SITE: NORMAL
SOURCE SOURCE: NORMAL

## 2020-09-02 PROCEDURE — A9270 NON-COVERED ITEM OR SERVICE: HCPCS | Performed by: NURSE PRACTITIONER

## 2020-09-02 PROCEDURE — 93922 UPR/L XTREMITY ART 2 LEVELS: CPT

## 2020-09-02 PROCEDURE — A9270 NON-COVERED ITEM OR SERVICE: HCPCS | Performed by: INTERNAL MEDICINE

## 2020-09-02 PROCEDURE — 700102 HCHG RX REV CODE 250 W/ 637 OVERRIDE(OP): Performed by: NURSE PRACTITIONER

## 2020-09-02 PROCEDURE — A9270 NON-COVERED ITEM OR SERVICE: HCPCS | Performed by: HOSPITALIST

## 2020-09-02 PROCEDURE — 700111 HCHG RX REV CODE 636 W/ 250 OVERRIDE (IP): Performed by: HOSPITALIST

## 2020-09-02 PROCEDURE — 99232 SBSQ HOSP IP/OBS MODERATE 35: CPT | Performed by: NURSE PRACTITIONER

## 2020-09-02 PROCEDURE — 700102 HCHG RX REV CODE 250 W/ 637 OVERRIDE(OP): Performed by: HOSPITALIST

## 2020-09-02 PROCEDURE — 700102 HCHG RX REV CODE 250 W/ 637 OVERRIDE(OP): Performed by: INTERNAL MEDICINE

## 2020-09-02 PROCEDURE — 770021 HCHG ROOM/CARE - ISO PRIVATE

## 2020-09-02 RX ORDER — LINEZOLID 600 MG/1
600 TABLET, FILM COATED ORAL EVERY 12 HOURS
Status: COMPLETED | OUTPATIENT
Start: 2020-09-02 | End: 2020-09-16

## 2020-09-02 RX ADMIN — GABAPENTIN 100 MG: 100 CAPSULE ORAL at 17:17

## 2020-09-02 RX ADMIN — DIVALPROEX SODIUM 125 MG: 125 CAPSULE ORAL at 20:59

## 2020-09-02 RX ADMIN — OXYCODONE HYDROCHLORIDE 5 MG: 5 TABLET ORAL at 20:59

## 2020-09-02 RX ADMIN — ENOXAPARIN SODIUM 40 MG: 40 INJECTION SUBCUTANEOUS at 04:50

## 2020-09-02 RX ADMIN — OXYCODONE HYDROCHLORIDE 5 MG: 5 TABLET ORAL at 10:02

## 2020-09-02 RX ADMIN — DOCUSATE SODIUM 50 MG AND SENNOSIDES 8.6 MG 2 TABLET: 8.6; 5 TABLET, FILM COATED ORAL at 17:18

## 2020-09-02 RX ADMIN — RISPERIDONE 0.5 MG: 0.5 TABLET ORAL at 04:50

## 2020-09-02 RX ADMIN — Medication 400 MG: at 04:50

## 2020-09-02 RX ADMIN — LINEZOLID 600 MG: 600 TABLET, FILM COATED ORAL at 20:59

## 2020-09-02 RX ADMIN — OXYCODONE HYDROCHLORIDE 5 MG: 5 TABLET ORAL at 13:03

## 2020-09-02 RX ADMIN — DIVALPROEX SODIUM 125 MG: 125 CAPSULE ORAL at 04:50

## 2020-09-02 RX ADMIN — DIVALPROEX SODIUM 125 MG: 125 CAPSULE ORAL at 13:02

## 2020-09-02 RX ADMIN — Medication 400 MG: at 17:17

## 2020-09-02 RX ADMIN — AMLODIPINE BESYLATE 5 MG: 5 TABLET ORAL at 04:50

## 2020-09-02 RX ADMIN — GABAPENTIN 100 MG: 100 CAPSULE ORAL at 04:50

## 2020-09-02 RX ADMIN — GABAPENTIN 100 MG: 100 CAPSULE ORAL at 13:02

## 2020-09-02 RX ADMIN — OXYCODONE HYDROCHLORIDE 5 MG: 5 TABLET ORAL at 04:50

## 2020-09-02 RX ADMIN — OXYCODONE HYDROCHLORIDE 5 MG: 5 TABLET ORAL at 17:17

## 2020-09-02 RX ADMIN — RISPERIDONE 0.5 MG: 0.5 TABLET ORAL at 17:17

## 2020-09-02 ASSESSMENT — PAIN DESCRIPTION - PAIN TYPE: TYPE: ACUTE PAIN

## 2020-09-02 NOTE — CARE PLAN
Problem: Safety  Goal: Will remain free from falls  Outcome: PROGRESSING AS EXPECTED  Note: Non-slip socks are on, bed is locked and in lowest position, personal belongings are within reach, room is free of clutter and spills, call light is in place. Patient educated on how to use the call light and how to call for assistance. Patient verbalized understanding.       Problem: Fluid Volume:  Goal: Will maintain balanced intake and output  Outcome: PROGRESSING AS EXPECTED     Problem: Pain Management  Goal: Pain level will decrease to patient's comfort goal  Outcome: PROGRESSING AS EXPECTED

## 2020-09-02 NOTE — CARE PLAN
Problem: Safety  Goal: Will remain free from falls  Outcome: PROGRESSING AS EXPECTED   Bed in lowest position, call within reach, rounding in place, low risk for falls, educated to call if dizzy     Problem: Infection  Goal: Will remain free from infection  Outcome: PROGRESSING AS EXPECTED   Standard precautions in place, patient educated on proper hand washing, nutrition promoted, IS used

## 2020-09-02 NOTE — RESPIRATORY CARE
COPD EDUCATION by COPD CLINICAL EDUCATOR  9/2/2020 at 4:11 PM by Dominga Sidhu, RRT     Patient reviewed by COPD education team. Patient does not have a history or diagnosis of COPD and is a non-smoker (quit on admit 8/7), therefore does not qualify for the COPD program.

## 2020-09-03 PROBLEM — A49.02 INFECTION OF WOUND DUE TO METHICILLIN RESISTANT STAPHYLOCOCCUS AUREUS (MRSA): Status: ACTIVE | Noted: 2018-08-23

## 2020-09-03 LAB
BACTERIA WND AEROBE CULT: ABNORMAL
ERYTHROCYTE [DISTWIDTH] IN BLOOD BY AUTOMATED COUNT: 51.4 FL (ref 35.9–50)
GRAM STN SPEC: ABNORMAL
HCT VFR BLD AUTO: 35 % (ref 42–52)
HGB BLD-MCNC: 11.2 G/DL (ref 14–18)
MCH RBC QN AUTO: 29.3 PG (ref 27–33)
MCHC RBC AUTO-ENTMCNC: 32 G/DL (ref 33.7–35.3)
MCV RBC AUTO: 91.6 FL (ref 81.4–97.8)
PLATELET # BLD AUTO: 397 K/UL (ref 164–446)
PMV BLD AUTO: 9 FL (ref 9–12.9)
RBC # BLD AUTO: 3.82 M/UL (ref 4.7–6.1)
SIGNIFICANT IND 70042: ABNORMAL
SITE SITE: ABNORMAL
SOURCE SOURCE: ABNORMAL
WBC # BLD AUTO: 6.5 K/UL (ref 4.8–10.8)

## 2020-09-03 PROCEDURE — 92526 ORAL FUNCTION THERAPY: CPT

## 2020-09-03 PROCEDURE — 700102 HCHG RX REV CODE 250 W/ 637 OVERRIDE(OP): Performed by: HOSPITALIST

## 2020-09-03 PROCEDURE — 770021 HCHG ROOM/CARE - ISO PRIVATE

## 2020-09-03 PROCEDURE — 700111 HCHG RX REV CODE 636 W/ 250 OVERRIDE (IP): Performed by: HOSPITALIST

## 2020-09-03 PROCEDURE — 85027 COMPLETE CBC AUTOMATED: CPT

## 2020-09-03 PROCEDURE — A9270 NON-COVERED ITEM OR SERVICE: HCPCS | Performed by: NURSE PRACTITIONER

## 2020-09-03 PROCEDURE — 700102 HCHG RX REV CODE 250 W/ 637 OVERRIDE(OP): Performed by: NURSE PRACTITIONER

## 2020-09-03 PROCEDURE — 97606 NEG PRS WND THER DME>50 SQCM: CPT

## 2020-09-03 PROCEDURE — 302098 PASTE RING (FLAT): Performed by: NURSE PRACTITIONER

## 2020-09-03 PROCEDURE — A9270 NON-COVERED ITEM OR SERVICE: HCPCS | Performed by: INTERNAL MEDICINE

## 2020-09-03 PROCEDURE — 700102 HCHG RX REV CODE 250 W/ 637 OVERRIDE(OP): Performed by: INTERNAL MEDICINE

## 2020-09-03 PROCEDURE — A9270 NON-COVERED ITEM OR SERVICE: HCPCS | Performed by: HOSPITALIST

## 2020-09-03 PROCEDURE — 700105 HCHG RX REV CODE 258

## 2020-09-03 PROCEDURE — 36415 COLL VENOUS BLD VENIPUNCTURE: CPT

## 2020-09-03 RX ORDER — SODIUM CHLORIDE 9 MG/ML
INJECTION, SOLUTION INTRAVENOUS
Status: COMPLETED
Start: 2020-09-03 | End: 2020-09-03

## 2020-09-03 RX ADMIN — OXYCODONE HYDROCHLORIDE 5 MG: 5 TABLET ORAL at 21:16

## 2020-09-03 RX ADMIN — DIVALPROEX SODIUM 125 MG: 125 CAPSULE ORAL at 05:27

## 2020-09-03 RX ADMIN — RISPERIDONE 0.5 MG: 0.5 TABLET ORAL at 05:27

## 2020-09-03 RX ADMIN — GABAPENTIN 100 MG: 100 CAPSULE ORAL at 05:27

## 2020-09-03 RX ADMIN — LINEZOLID 600 MG: 600 TABLET, FILM COATED ORAL at 16:33

## 2020-09-03 RX ADMIN — RISPERIDONE 0.5 MG: 0.5 TABLET ORAL at 16:33

## 2020-09-03 RX ADMIN — OXYCODONE HYDROCHLORIDE 5 MG: 5 TABLET ORAL at 11:29

## 2020-09-03 RX ADMIN — DOCUSATE SODIUM 50 MG AND SENNOSIDES 8.6 MG 2 TABLET: 8.6; 5 TABLET, FILM COATED ORAL at 16:32

## 2020-09-03 RX ADMIN — DIVALPROEX SODIUM 125 MG: 125 CAPSULE ORAL at 14:23

## 2020-09-03 RX ADMIN — OXYCODONE HYDROCHLORIDE 5 MG: 5 TABLET ORAL at 05:27

## 2020-09-03 RX ADMIN — OXYCODONE HYDROCHLORIDE 5 MG: 5 TABLET ORAL at 14:45

## 2020-09-03 RX ADMIN — DIVALPROEX SODIUM 125 MG: 125 CAPSULE ORAL at 21:16

## 2020-09-03 RX ADMIN — OXYCODONE HYDROCHLORIDE 5 MG: 5 TABLET ORAL at 18:35

## 2020-09-03 RX ADMIN — ENOXAPARIN SODIUM 40 MG: 40 INJECTION SUBCUTANEOUS at 05:27

## 2020-09-03 RX ADMIN — Medication 400 MG: at 05:27

## 2020-09-03 RX ADMIN — ACETAMINOPHEN 650 MG: 325 TABLET, FILM COATED ORAL at 16:12

## 2020-09-03 RX ADMIN — LINEZOLID 600 MG: 600 TABLET, FILM COATED ORAL at 05:27

## 2020-09-03 RX ADMIN — AMLODIPINE BESYLATE 5 MG: 5 TABLET ORAL at 05:27

## 2020-09-03 RX ADMIN — GABAPENTIN 100 MG: 100 CAPSULE ORAL at 11:28

## 2020-09-03 RX ADMIN — SODIUM CHLORIDE: 9 INJECTION, SOLUTION INTRAVENOUS at 12:15

## 2020-09-03 RX ADMIN — Medication 400 MG: at 16:32

## 2020-09-03 RX ADMIN — GABAPENTIN 100 MG: 100 CAPSULE ORAL at 16:32

## 2020-09-03 ASSESSMENT — PAIN DESCRIPTION - PAIN TYPE
TYPE: ACUTE PAIN

## 2020-09-03 NOTE — PROGRESS NOTES
"Hospital Medicine Bi Weekly Progress Note    Date of Service  9/2/2020    Chief Complaint  LLE wound    Hospital Course   Mr. Varela is a 64-year-old male with PMH significant for homelessness, etoh use, tobacco dependence and chronic LLE wound who presented 8/7/2020 with LLE wound infection.  He was hospitalized at our facility in April and evaluated by orthopedic surgery who recommended wound care.  Wound cultures at that time grew MSSA and strep.  Patient reported losing his Medicaid card and having no transportation to wound clinic.  He was seen at [facility] earlier this week and prescribed ABX, however he has not filled the prescription.  US LLE negative for DVT.  Treated for sepsis with empiric ABX and wound care.  He was found to have head lice and underwent treatments. Continued to have intermittent agitation so was started on risperdol, depakote and gabapentin per psych recs.  He has been deemed incapacitated to make medical decisions and is currently pending guardianship and placement.  He is medically stable and will be only be evaluated 2 times weekly unless there is a clinical change.          Interval Problem Update  Patient is ambulating in the room, pleasant and cooperative. He has 10/10 pain in his lower left extremity. He is eager to discharge. Being here is driving him \"up the wall.\" He would prefer to discharge to the shelter rather than a group home, though he admits he has never been to a group home. He wants to live alone.   -Vital signs within normal limits, afebrile  -All systems reviewed and exam is unchanged from prior assessment.  -Pursed lip breathing, likely emphysema given hyperinflation seen on chest xray. Could also be COPD. He is a smoker.  -wound still extremely painful and with yellow drainage, edema and erythema per wound care note and photos 9/1.   -repeat wound culture ordered by LPS growing likely MSSA and strep A.  -started Zyvox 9/2 in consultation with pharmacy " team. Follow cultures.    Consultants/Specialty  Psychiatry    Code Status  Full Code    Disposition  Pursuing guardianship and placement    Assessment/Plan  Open wound of left lower leg- (present on admission)  Assessment & Plan  -chronic, history of MRSA in this wound according to chart reveiw  -poor compliance with OP wound care. He can't remember the last time he had his dressing changed OP  -wound care  -Previous cultures grew MSSA and group A strep.  Wound cultures from this hospitalization also growing MSSA and group A strep  -blood cultures (-) to date  -afebrile. Leukocytosis resolved  -LLE US (-) DVT  -10 days of augmentin completed 8/21  -wound still extremely painful and with yellow drainage, edema and erythema.   -repeat wound culture ordered by LPS growing likely MSSA and strep A.  -started Zyvox 9/2 in consultation with pharmacy team. Follow cultures.    Encephalopathy- (present on admission)  Assessment & Plan  -acute on chronic  -likely due to significant EtOH history.  Possible component of Warnicke's encephalopathy  -Psychiatry evaluated him and deemed him incapacitated to make medical decision or leave AMA and recommend SLP cognitive eval.  Patient will likely require guardianship and placement  -CT head unremarkable  -no acute neurological deficits  -Avoid narcotics and hypnotics.  Cautious use of benzodiazepines for alcohol withdrawal protocol  -Frequent reorientation  -Sleep hygiene    Non-compliance  Assessment & Plan  -likely also component of psychiatric disorder and ETOH abuse  -Psychiatric consulted and suggests patient is incapacitated to make medical decisions or leave AMA  -ongoing encouragement for compliance.    Psychiatric disorder  Assessment & Plan  -unknown/unspecified  -not currently on medication  -history of severe alcohol dependence.  -psychiatry consulted for agitation - incapacitated to leave AMA or make medical decisions, appreciate recs: risperdol 0.5mg BID, added  "depakote 125mg TID (8/15/20, watch LFTs), added neurotin 100mg TID 8/16  -Pending guardianship. Application to be submitted 9/3.    Flexion contracture of knee, 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)  Assessment & Plan  -secondary to chronic wound in that area  -PT OT  -encourage mobility    Emphysema/COPD (HCC)  Assessment & Plan  Hyperinflated lungs on Chest x-ray  Pursed-lip breathing  Saturating well on room air    Head lice  Assessment & Plan  Head lice found  S/p first round of treatment 8/10, treated again on 8/13 given active lice seen by CNA, will need second treatment 8/20. Given 3rd treatment.  If still having issues will likely need to shave patient    Alcohol dependence (HCC)- (present on admission)  Assessment & Plan  -history of.  Ongoing.  He reports drinking \"once in a while\"  -s/p WD, was on CIWA, uncomplicated  -MVI and thiamine    Protein malnutrition (HCC)  Assessment & Plan  -history of ETOH and homelessness  -dietary consult  -TID supplements  -encourage PO intake    Sepsis (HCC)- (present on admission)  Assessment & Plan  -This is Sepsis Present on admission  SIRS criteria identified on my evaluation include: Leukocytosis, with WBC greater than 12,000   Source is cellulitis and wound infection  Sepsis protocol initiated  Fluid resuscitation ordered per protocol  IV antibiotics as appropriate for source of sepsis  While organ dysfunction may be noted elsewhere in this problem list or in the chart, degree of organ dysfunction does not meet CMS criteria for severe sepsis  -afebrile  -lactic WNL  -blood cultures negative to date  -Sepsis has resolved    Hypomagnesemia  Assessment & Plan  -Likely related to EtOH abuse history  -Resolved    Hypokalemia- (present on admission)  Assessment & Plan  -Also with hypomagnesemia  -Resolved after replacement    Tobacco dependence- (present on admission)  Assessment & Plan  -Nicotine replacement protocol       VTE prophylaxis: Enoxaparin    Nya Gonzalez, " FELIPE

## 2020-09-03 NOTE — CARE PLAN
Problem: Venous Thromboembolism (VTW)/Deep Vein Thrombosis (DVT) Prevention:  Goal: Patient will participate in Venous Thrombosis (VTE)/Deep Vein Thrombosis (DVT)Prevention Measures  Outcome: PROGRESSING AS EXPECTED   Patient refuses SCDs but walks around room frequently throughout day and night. Sitter in place for safety/elopement risk.    Problem: Pain Management  Goal: Pain level will decrease to patient's comfort goal  Outcome: PROGRESSING AS EXPECTED   Patient receiving oxycodone as needed for pain. Educated to call staff if pain increases and educated on how to use call light.

## 2020-09-03 NOTE — DISCHARGE PLANNING
Anticipated Discharge Disposition: TBD    Action: Physicians assessment for Guardianship given to Lisa ELLER.    Barriers to Discharge: Guardianship    Plan: f/u with medical team

## 2020-09-03 NOTE — PROGRESS NOTES
Mobility Progress Note    Surgery patient: No  Date of surgery: N/a  Ambulated 50 ft on day of surgery? (N/A if patient did not undergo surgery today): N/a  Number of times ambulated 50 feet or greater today: >3  Patient has been up to chair, edge of bed or HOB 90 degrees for all meals?: Not charted, but patient is up self.  Goal met? (goal is ambulating at least 50 feet 2 times on day shift, one time on night shift): Yes  If patient did not meet mobility goal, why?: N/a

## 2020-09-03 NOTE — WOUND TEAM
[facility]  Inpatient Services  Wound and Skin Care Progress Note    HPI, PMH, SH: Reviewed    Unit where seen by Wound Team: T309/00     WOUND CONSULT RELATED TO:  Left leg wound    Self Report / Pain Level:  Patient denies pain to lower left leg. Did report a small amount of pain with wound cleansing.     OBJECTIVE:  Patient standing up in front of the TV excitedly yelling at the screen and laughing.     WOUND TYPE, LOCATION, CHARACTERISTICS (Pressure Injuries: location, stage, POA or date identified)   Negative Pressure Wound Therapy 09/03/20 Pretibial (Active)   NPWT Pump Mode / Pressure Setting 125 mmHg;Continuous 09/03/20 1300   Dressing Type Black Foam (Veraflo);Medium 09/03/20 1300   Number of Foam Pieces Used 6 09/03/20 1300   Canister Changed Yes 09/03/20 1300   VAC VeraFlo Irrigant Normal Saline 09/03/20 1300   VAC VeraFlo Soak Time (mins) 6 09/03/20 1300   VAC VeraFlo Instill Volume (ml) 32 09/03/20 1300   VAC VeraFlo - Therapy Time (hrs) 2 09/03/20 1300        Wound 08/07/20 Full Thickness Wound Leg LLE posterior, extends medially and laterally (Active)   Wound Image     09/01/20 1300   Site Assessment Red;Yellow 09/03/20 1300   Periwound Assessment Scar tissue;Red 09/03/20 1300   Margins Attached edges;Defined edges 09/03/20 1300   Closure Secondary intention 09/03/20 1300   Drainage Amount Large 09/03/20 1300   Drainage Description Serosanguineous 09/03/20 1300   Treatments Cleansed;Site care 09/03/20 1300   Wound Cleansing Normal Saline Irrigation 09/03/20 1300   Periwound Protectant Skin Protectant Wipes to Periwound;Benzoin;Paste Ring 09/03/20 1300   Dressing Cleansing/Solutions Normal Saline 09/03/20 1300   Dressing Options Wound Vac;Compression Wrap Two Layer;Tubigrip 09/03/20 1300   Dressing Changed Changed 09/03/20 1300   Dressing Status Clean;Dry;Intact;New drainage 09/03/20 1300   Dressing Change/Treatment Frequency Tuesday, Thursday, Saturday, and As Needed 09/03/20 1300    NEXT Dressing Change/Treatment Date 09/05/20 09/03/20 1300   NEXT Weekly Photo (Inpatient Only) 09/08/20 09/03/20 1300   Non-staged Wound Description Full thickness 09/03/20 1300   Wound Length (cm) 42 cm 09/01/20 1300   Wound Width (cm) 15 cm 09/01/20 1300   Wound Depth (cm) 0.2 cm 09/01/20 1300   Wound Surface Area (cm^2) 630 cm^2 09/01/20 1300   Wound Volume (cm^3) 126 cm^3 09/01/20 1300   Post-Procedure Length (cm) 42 cm 09/01/20 1300   Post-Procedure Width (cm) 16 cm 09/01/20 1300   Post-Procedure Depth (cm) 0.5 cm 09/01/20 1300   Post-Procedure Surface Area (cm^2) 672 cm^2 09/01/20 1300   Post-Procedure Volume (cm^3) 336 cm^3 09/01/20 1300   Wound Healing % -139 09/01/20 1300   Wound Bed Granulation (%) 0 % 09/01/20 1300   Wound Bed Epithelium (%) 0 % 09/01/20 1300   Wound Bed Slough (%) 60 % 09/01/20 1300   Wound Bed Granulation (%) - Post-Procedure 0 % 09/01/20 1300   Wound Bed Epithelium % - (Post-Procedure) 0 % 09/01/20 1300   Wound Bed Slough % - (Post-Procedure) 0 % 09/01/20 1300   Wound Bed Eschar % - (Post-Procedure) 0 % 09/01/20 1300   Tunneling (cm) 0 cm 09/03/20 1300   Undermining (cm) 0 cm 09/03/20 1300   Shape Irregular, elongated 09/03/20 1300   Wound Odor Mild;Foul 09/03/20 1300   Pulses Left;2+;DP;PT 08/26/20 0900   Exposed Structures None 09/03/20 1300   WOUND NURSE ONLY - Time Spent with Patient (mins) 110 09/01/20 1300     Vascular:    Dorsal Pedal pulses:  palpated by LPS ARPN  Posterior tib pulses:   see LPS note    KOSTAS:   Normal bilateral KOSTAS's. R = 1.18; Left = 1.09       Lab Values:    Lab Results   Component Value Date/Time    WBC 6.5 09/03/2020 11:31 AM    RBC 3.82 (L) 09/03/2020 11:31 AM    HEMOGLOBIN 11.2 (L) 09/03/2020 11:31 AM    HEMATOCRIT 35.0 (L) 09/03/2020 11:31 AM       Lab Results   Component Value Date/Time    HBA1C 5.7 (H) 11/09/2018 06:30 PM           Culture:   Obtained 9/2/2020; (+) MRSA, beta hemolytic strep A, diphteroids.     INTERVENTIONS BY WOUND TEAM:  Patient  very agitated when we arrived to the room. He was standing in front of the television yelling and laughing. With some coaxing, we were able to get him to lie on the bed so we could place the veraflow. Patient laid on bed, on his stomach which exposed most of the wound. Wound cleaned with saline and dry gauze 4 x 4s. See flowsheets for wound dressing.      Interdisciplinary consultation: Patient, Bedside RN Marissa, Wound team Nathalia Mendoza, Bedside sitter    EVALUATION: Wound with a strong foul odor today. Wound is positive for multiple bacteria. Veraflow applied to wound bed to cleanse wound, to decrease healing time, and manage drainage. Tubi  E for compression to help reduce edema; 2 layer compression wrap to manage edema.   If there is maceration from the veraflow, we may have to decrease compression to a Tubigrip.     Goals: Manage drainage and protect wounds, place NPWT Thursday, Steady decrease in wound area and depth weekly.     NURSING PLAN OF CARE ORDERS (X):  Dressing changes: See Dressing Care orders: X  Skin care: See Skin Care orders: NA  Rectal tube care: See Rectal Tube Care orders:        Other orders:                             WOUND TEAM PLAN OF CARE (X):   Dressing changes by wound team:          Follow up 1-2 times weekly:            Follow up 3 times weekly:                NPWT change 3 times weekly: X    Follow up as needed:       Other (explain):     Anticipated discharge plans (X): Pending guardianship   LTACH:        SNF/Rehab:                  Home Care:           Outpatient Wound Center:            Self Care:            Other:

## 2020-09-03 NOTE — CARE PLAN
Problem: Safety  Goal: Will remain free from falls  Outcome: PROGRESSING AS EXPECTED     Problem: Venous Thromboembolism (VTW)/Deep Vein Thrombosis (DVT) Prevention:  Goal: Patient will participate in Venous Thrombosis (VTE)/Deep Vein Thrombosis (DVT)Prevention Measures  Outcome: PROGRESSING AS EXPECTED     Problem: Bowel/Gastric:  Goal: Normal bowel function is maintained or improved  Outcome: PROGRESSING AS EXPECTED  Goal: Will not experience complications related to bowel motility  Outcome: PROGRESSING AS EXPECTED     Problem: Fluid Volume:  Goal: Will maintain balanced intake and output  Outcome: PROGRESSING AS EXPECTED     Problem: Pain Management  Goal: Pain level will decrease to patient's comfort goal  Outcome: PROGRESSING AS EXPECTED     Problem: Mobility  Goal: Risk for activity intolerance will decrease  Outcome: PROGRESSING AS EXPECTED     Problem: Respiratory:  Goal: Respiratory status will improve  Outcome: PROGRESSING AS EXPECTED

## 2020-09-04 PROCEDURE — 700111 HCHG RX REV CODE 636 W/ 250 OVERRIDE (IP): Performed by: HOSPITALIST

## 2020-09-04 PROCEDURE — A9270 NON-COVERED ITEM OR SERVICE: HCPCS | Performed by: HOSPITALIST

## 2020-09-04 PROCEDURE — 700102 HCHG RX REV CODE 250 W/ 637 OVERRIDE(OP): Performed by: HOSPITALIST

## 2020-09-04 PROCEDURE — 700102 HCHG RX REV CODE 250 W/ 637 OVERRIDE(OP): Performed by: INTERNAL MEDICINE

## 2020-09-04 PROCEDURE — A9270 NON-COVERED ITEM OR SERVICE: HCPCS | Performed by: NURSE PRACTITIONER

## 2020-09-04 PROCEDURE — 700102 HCHG RX REV CODE 250 W/ 637 OVERRIDE(OP): Performed by: NURSE PRACTITIONER

## 2020-09-04 PROCEDURE — 770021 HCHG ROOM/CARE - ISO PRIVATE

## 2020-09-04 PROCEDURE — A9270 NON-COVERED ITEM OR SERVICE: HCPCS | Performed by: INTERNAL MEDICINE

## 2020-09-04 RX ADMIN — RISPERIDONE 0.5 MG: 0.5 TABLET ORAL at 04:34

## 2020-09-04 RX ADMIN — Medication 400 MG: at 16:09

## 2020-09-04 RX ADMIN — GABAPENTIN 100 MG: 100 CAPSULE ORAL at 16:09

## 2020-09-04 RX ADMIN — AMLODIPINE BESYLATE 5 MG: 5 TABLET ORAL at 04:34

## 2020-09-04 RX ADMIN — GABAPENTIN 100 MG: 100 CAPSULE ORAL at 10:31

## 2020-09-04 RX ADMIN — LINEZOLID 600 MG: 600 TABLET, FILM COATED ORAL at 04:34

## 2020-09-04 RX ADMIN — GABAPENTIN 100 MG: 100 CAPSULE ORAL at 04:33

## 2020-09-04 RX ADMIN — OXYCODONE HYDROCHLORIDE 5 MG: 5 TABLET ORAL at 08:48

## 2020-09-04 RX ADMIN — OXYCODONE HYDROCHLORIDE 5 MG: 5 TABLET ORAL at 21:13

## 2020-09-04 RX ADMIN — DOCUSATE SODIUM 50 MG AND SENNOSIDES 8.6 MG 2 TABLET: 8.6; 5 TABLET, FILM COATED ORAL at 04:33

## 2020-09-04 RX ADMIN — OXYCODONE HYDROCHLORIDE 5 MG: 5 TABLET ORAL at 04:34

## 2020-09-04 RX ADMIN — DIVALPROEX SODIUM 125 MG: 125 CAPSULE ORAL at 13:25

## 2020-09-04 RX ADMIN — DIVALPROEX SODIUM 125 MG: 125 CAPSULE ORAL at 04:34

## 2020-09-04 RX ADMIN — LINEZOLID 600 MG: 600 TABLET, FILM COATED ORAL at 16:14

## 2020-09-04 RX ADMIN — Medication 400 MG: at 04:34

## 2020-09-04 RX ADMIN — DIVALPROEX SODIUM 125 MG: 125 CAPSULE ORAL at 21:13

## 2020-09-04 RX ADMIN — OXYCODONE HYDROCHLORIDE 5 MG: 5 TABLET ORAL at 12:20

## 2020-09-04 RX ADMIN — RISPERIDONE 0.5 MG: 0.5 TABLET ORAL at 16:09

## 2020-09-04 RX ADMIN — OXYCODONE HYDROCHLORIDE 5 MG: 5 TABLET ORAL at 15:48

## 2020-09-04 RX ADMIN — ENOXAPARIN SODIUM 40 MG: 40 INJECTION SUBCUTANEOUS at 04:33

## 2020-09-04 ASSESSMENT — PAIN DESCRIPTION - PAIN TYPE
TYPE: ACUTE PAIN

## 2020-09-04 NOTE — CARE PLAN
Problem: Safety  Goal: Will remain free from falls  Outcome: PROGRESSING AS EXPECTED  Note: Call light/belongings in reach, bed in low position and locked, front wheel walker out of sight, siderails up x 2, pt wearing non-slip footwear, adequate lighting, clutter free environment. Educate on level of risk, oriented to use of call light and encouraged pt to call before attempting to get out of bed.      Problem: Pain Management  Goal: Pain level will decrease to patient's comfort goal  Outcome: PROGRESSING AS EXPECTED  Note: Administering pain mediations as ordered (see MAR). Providing patient with extra blankets, pillows for support and distraction.

## 2020-09-04 NOTE — PROGRESS NOTES
Report from day RN, POC discussed, isolation observed, wound vac in placed, ambulates inside the room.

## 2020-09-04 NOTE — THERAPY
"Speech Language Pathology  Daily Treatment     Patient Name: Bola Varela  Age:  64 y.o., Sex:  male  Medical Record #: [id number]  Today's Date: 9/3/2020     Precautions: Fall Risk, Swallow Precautions ( See Comments)  Comments: poor short term memory and problem solving    Assessment    Patient was seen on this date for dysphagia treatment with an SB6/TN0 meal tray. Patient very upset about getting skim mild versus whole milk and when I introduced myself as a speech therapist patient began yelling \"I don't have a speech impediment. I'm going to get my !\" and threw his hat across the room before I could explain reason for session. Patient was calmed by bringing in whole milk and presenting items from meal tray. Patient likes jazz music so the channel was changed so he could enjoy some music during his meal. He was in a good mood thereafter. Patient consumed 100% of soft & bite size textures and ~12 oz thin liquids with no overt s/sx of aspiration. Patient had delayed cough x1 following pudding and occurred when talking with bolus in mouth. Conversation was limited during the course of the meal due to patient demonstrating poor insight and becoming slightly agitated when given cues to eliminate talking with bolus in mouth.     Plan    Suspect patient is at baseline in regards to safest diet but will try an EC7 trial tray during next session to assess independent use of safe swallow strategies given that patient has not been receptive to therapeutic intervention. Continue soft & bite size (level 6) and thin liquid (level 0) diet. Please limit conversation with patient and distractions during meal times.     Continue current treatment plan.    Discharge Recommendations: Recommend post-acute placement for additional speech therapy services prior to discharge home       Objective       09/03/20 1720   Vitals   O2 Delivery Device None - Room Air   Dysphagia    Positioning / Behavior Modification Modulate Rate " or Bite Size;Self Monitoring   Other Treatments SB6/TN0 meal tray   Diet / Liquid Recommendation Soft & Bite-Sized (6) - (Dysphagia III);Thin (0)   Skilled Intervention Compensatory Strategies;Verbal Cueing   Recommended Route of Medication Administration   Medication Administration  Whole with Liquid Wash   Short Term Goals   Short Term Goal # 2 New 8/28: Patient will consume meals of SB6/TN0 with no s/sx of aspiration given monitoring during meals.   Goal Outcome # 2  Progressing as expected   Short Term Goal # 2 B  Patient will consume meals of EC7/TN0 with no s/sx of aspiration given monitoring during meals.   Goal Outcome  # 2 B Goal not met

## 2020-09-04 NOTE — CARE PLAN
Problem: Infection  Goal: Will remain free from infection  Outcome: PROGRESSING AS EXPECTED  Intervention: Assess signs and symptoms of infection  Note: Contact isolation observed, on oral antibiotic.     Problem: Pain Management  Goal: Pain level will decrease to patient's comfort goal  Outcome: PROGRESSING AS EXPECTED  Intervention: Educate and implement non-pharmacologic comfort measures. Examples: relaxation, distration, play therapy, activity therapy, massage, etc.  Flowsheets (Taken 9/4/2020 0217)  Intervention:   Relaxation Technique   Repositioned   Rest   Medication (see MAR)  Note: Pain assessment q 2 hours, medicated as needed, comfort measures provided.

## 2020-09-04 NOTE — PROGRESS NOTES
Patient had wound vac placed today, learning how to cart it around to the bathroom and around in his room.  Patient very frustrated with the wound vac and having to carry it everywhere.  He is very restless in his room, if you sit and talk with him and explain why and give him tips to remind him, he feels better.Patient safe as of shift change.

## 2020-09-05 PROCEDURE — 700102 HCHG RX REV CODE 250 W/ 637 OVERRIDE(OP): Performed by: NURSE PRACTITIONER

## 2020-09-05 PROCEDURE — A9270 NON-COVERED ITEM OR SERVICE: HCPCS | Performed by: NURSE PRACTITIONER

## 2020-09-05 PROCEDURE — 29580 STRAPPING UNNA BOOT: CPT

## 2020-09-05 PROCEDURE — A9270 NON-COVERED ITEM OR SERVICE: HCPCS | Performed by: INTERNAL MEDICINE

## 2020-09-05 PROCEDURE — 770021 HCHG ROOM/CARE - ISO PRIVATE

## 2020-09-05 PROCEDURE — 700102 HCHG RX REV CODE 250 W/ 637 OVERRIDE(OP): Performed by: INTERNAL MEDICINE

## 2020-09-05 PROCEDURE — 700102 HCHG RX REV CODE 250 W/ 637 OVERRIDE(OP): Performed by: HOSPITALIST

## 2020-09-05 PROCEDURE — 97606 NEG PRS WND THER DME>50 SQCM: CPT

## 2020-09-05 PROCEDURE — A9270 NON-COVERED ITEM OR SERVICE: HCPCS | Performed by: HOSPITALIST

## 2020-09-05 RX ORDER — SODIUM CHLORIDE 9 MG/ML
INJECTION, SOLUTION INTRAVENOUS
Status: ACTIVE
Start: 2020-09-05 | End: 2020-09-06

## 2020-09-05 RX ADMIN — DIVALPROEX SODIUM 125 MG: 125 CAPSULE ORAL at 21:01

## 2020-09-05 RX ADMIN — OXYCODONE HYDROCHLORIDE 5 MG: 5 TABLET ORAL at 09:16

## 2020-09-05 RX ADMIN — RISPERIDONE 0.5 MG: 0.5 TABLET ORAL at 06:06

## 2020-09-05 RX ADMIN — RISPERIDONE 0.5 MG: 0.5 TABLET ORAL at 16:15

## 2020-09-05 RX ADMIN — LINEZOLID 600 MG: 600 TABLET, FILM COATED ORAL at 06:06

## 2020-09-05 RX ADMIN — OXYCODONE HYDROCHLORIDE 5 MG: 5 TABLET ORAL at 16:18

## 2020-09-05 RX ADMIN — DIVALPROEX SODIUM 125 MG: 125 CAPSULE ORAL at 13:18

## 2020-09-05 RX ADMIN — DIVALPROEX SODIUM 125 MG: 125 CAPSULE ORAL at 06:06

## 2020-09-05 RX ADMIN — LINEZOLID 600 MG: 600 TABLET, FILM COATED ORAL at 16:15

## 2020-09-05 RX ADMIN — OXYCODONE HYDROCHLORIDE 5 MG: 5 TABLET ORAL at 02:37

## 2020-09-05 RX ADMIN — OXYCODONE HYDROCHLORIDE 5 MG: 5 TABLET ORAL at 06:06

## 2020-09-05 RX ADMIN — GABAPENTIN 100 MG: 100 CAPSULE ORAL at 06:06

## 2020-09-05 RX ADMIN — Medication 400 MG: at 16:15

## 2020-09-05 RX ADMIN — Medication 400 MG: at 06:06

## 2020-09-05 RX ADMIN — AMLODIPINE BESYLATE 5 MG: 5 TABLET ORAL at 06:06

## 2020-09-05 RX ADMIN — GABAPENTIN 100 MG: 100 CAPSULE ORAL at 16:15

## 2020-09-05 RX ADMIN — OXYCODONE HYDROCHLORIDE 5 MG: 5 TABLET ORAL at 13:18

## 2020-09-05 RX ADMIN — OXYCODONE HYDROCHLORIDE 5 MG: 5 TABLET ORAL at 21:01

## 2020-09-05 RX ADMIN — GABAPENTIN 100 MG: 100 CAPSULE ORAL at 10:44

## 2020-09-05 ASSESSMENT — PAIN DESCRIPTION - PAIN TYPE
TYPE: ACUTE PAIN

## 2020-09-05 NOTE — PROGRESS NOTES
Hospital Medicine  Weekly Progress Note    Date of Service  9/5/2020    Chief Complaint  LLE wound    Hospital Course   Mr. Varela is a 64-year-old male with PMH significant for homelessness, etoh use, tobacco dependence and chronic LLE wound who presented 8/7/2020 with LLE wound infection.  He was hospitalized at our facility in April and evaluated by orthopedic surgery who recommended wound care.  Wound cultures at that time grew MSSA and strep.  Patient reported losing his Medicaid card and having no transportation to wound clinic.  He was seen at [facility] earlier this week and prescribed ABX, however he has not filled the prescription.  US LLE negative for DVT.  Treated for sepsis with empiric ABX and wound care.  He was found to have head lice and underwent treatments. Continued to have intermittent agitation so was started on risperdol, depakote and gabapentin per psych recs.  He has been deemed incapacitated to make medical decisions and is currently pending guardianship and placement.  He is medically stable and will be only be evaluated 2 times weekly unless there is a clinical change.          Interval Problem Update  Patient is ambulating in the room, pleasant and cooperative.  He is fully alert and oriented.  He has 10/10 pain in his lower left extremity.   -Vital signs within normal limits, afebrile  -All systems reviewed and exam is unchanged from prior assessment.  -Pursed lip breathing, likely emphysema given hyperinflation seen on chest xray. Could also be COPD. He is a smoker.  -wound culture growing likely MRSA and strep A.  -started Zyvox 9/2 in consultation with pharmacy team. Follow cultures.  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 He can't remember the last time he had his dressing changed OP  -LLE US (-) DVT  -wound care  -10 days of augmentin completed 8/21  -wound still extremely painful and with yellow drainage, edema and erythema.   -Previous cultures grew MSSA and group A strep.  Wound cultures 9/2:  Culture Result Abnormal   Beta Hemolytic Streptococcus group A   Moderate growth     Culture Result Abnormal   Methicillin Resistant Staphylococcus aureus   Moderate growth    -blood cultures (-) to date  -afebrile. Leukocytosis resolved  -started Zyvox 9/2 in consultation with pharmacy team. Continue.    Encephalopathy- (present on admission)  Assessment & Plan  -acute on chronic  -likely due to significant EtOH history.  Possible component of Warnicke's encephalopathy  -Psychiatry evaluated him and deemed him incapacitated to make medical decision or leave AMA and recommend SLP cognitive eval.  Patient will likely require guardianship and placement  -CT head unremarkable  -no acute neurological deficits  -Avoid narcotics and hypnotics.  Cautious use of benzodiazepines for alcohol withdrawal protocol  -Frequent reorientation  -Sleep hygiene    Non-compliance  Assessment & Plan  -likely also component of psychiatric disorder and ETOH abuse  -Psychiatric consulted and suggests patient is incapacitated to make medical decisions or leave AMA  -ongoing encouragement for compliance.    Psychiatric disorder  Assessment & Plan  -unknown/unspecified  -not currently on medication  -history of severe alcohol dependence.  -psychiatry consulted for agitation - incapacitated to leave AMA or make medical decisions, appreciate recs: risperdol 0.5mg BID, added depakote 125mg TID (8/15/20, watch LFTs), added neurotin 100mg TID 8/16  -Pending guardianship. Application to be submitted 9/3.    Flexion contracture of  "knee, 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)  Assessment & Plan  -secondary to chronic wound in that area  -PT OT  -encourage mobility    Emphysema/COPD (HCC)  Assessment & Plan  Hyperinflated lungs on Chest x-ray  Pursed-lip breathing  Saturating well on room air    Head lice  Assessment & Plan  Head lice found  S/p first round of treatment 8/10, treated again on 8/13 given active lice seen by CNA, will need second treatment 8/20. Given 3rd treatment.  If still having issues will likely need to shave patient    Alcohol dependence (HCC)- (present on admission)  Assessment & Plan  -history of.  Ongoing.  He reports drinking \"once in a while\"  -s/p WD, was on CIWA, uncomplicated  -MVI and thiamine    Protein malnutrition (HCC)  Assessment & Plan  -history of ETOH and homelessness  -dietary consult  -TID supplements  -encourage PO intake    Sepsis (HCC)- (present on admission)  Assessment & Plan  -This is Sepsis Present on admission  SIRS criteria identified on my evaluation include: Leukocytosis, with WBC greater than 12,000   Source is cellulitis and wound infection  Sepsis protocol initiated  Fluid resuscitation ordered per protocol  IV antibiotics as appropriate for source of sepsis  While organ dysfunction may be noted elsewhere in this problem list or in the chart, degree of organ dysfunction does not meet CMS criteria for severe sepsis  -afebrile  -lactic WNL  -blood cultures negative to date  -Sepsis has resolved    Hypomagnesemia  Assessment & Plan  -Likely related to EtOH abuse history  -Resolved    Hypokalemia- (present on admission)  Assessment & Plan  -Also with hypomagnesemia  -Resolved after replacement    Tobacco dependence- (present on admission)  Assessment & Plan  -Nicotine replacement protocol       VTE prophylaxis: Enoxaparin    LOUISE Hull.          "

## 2020-09-05 NOTE — WOUND TEAM
[facility]  Inpatient Services  Wound and Skin Care Progress Note    HPI, PMH, SH: Reviewed    Unit where seen by Wound Team: T309/00     WOUND CONSULT RELATED TO:  Left leg wound    Subjective: reports he appreciates everything everyone does. Asked questions about what the dressing does.    Self Report / Pain Level:  No complaint of pain. Expressed discomfort when foam removed.     OBJECTIVE:  Returned from bathroom with VAC    WOUND TYPE, LOCATION, CHARACTERISTICS (Pressure Injuries: location, stage, POA or date identified)       Negative Pressure Wound Therapy 09/03/20 L leg medial/posterior   NPWT Pump Mode / Pressure Setting 125 mmHg;Continuous    Dressing Type Black Foam (Veraflo)    Number of Foam Pieces Used 2    Canister Changed No    VAC VeraFlo Irrigant Normal Saline    VAC VeraFlo Soak Time (mins) 6    VAC VeraFlo Instill Volume (ml) 32    VAC VeraFlo - Therapy Time (hrs) 2    VAC VeraFlo Pressure (mm/Hg) 125 mmHg;Continuous            Wound 08/07/20 Full Thickness Wound Leg LLE posterior, extends medially and laterally (Active)   Wound Image     09/01/20   Site Assessment Red    Periwound Assessment Intact    Margins Defined edges    Drainage Amount Small    Drainage Description Serosanguineous    Treatments Cleansed    Wound Cleansing Approved Wound Cleanser    Periwound Protectant Skin Protectant Wipes to Periwound;Paste Ring    Dressing Cleansing/Solutions Normal Saline    Dressing Options Wound Vac;Compression Wrap Two Layer    Dressing Changed Changed    Dressing Change/Treatment Frequency Tuesday, Thursday, Saturday, and As Needed    NEXT Dressing Change/Treatment Date 09/08/20    NEXT Weekly Photo (Inpatient Only) 09/08/20    Non-staged Wound Description Full thickness    Wound Length (cm) 42 cm measured 09/01/20   Wound Width (cm) 15 cm    Wound Depth (cm) 0.2 cm    Wound Surface Area (cm^2) 630 cm^2    Wound Volume (cm^3) 126 cm^3    Post-Procedure Length (cm) 42 cm     Post-Procedure Width (cm) 16 cm    Post-Procedure Depth (cm) 0.5 cm    Post-Procedure Surface Area (cm^2) 672 cm^2    Post-Procedure Volume (cm^3) 336 cm^3    Wound Bed Granulation (%) 100 %    Shape Irregular, elongated    Wound Odor None    Exposed Structures None        Vascular:    Dorsal Pedal pulses: NT  Posterior tib pulses:  NT    KOSTAS:   Performed 9/2/20 Normal bilateral KOSTAS's. R = 1.18; Left = 1.09       Lab Values:    Lab Results   Component Value Date/Time    WBC 6.5 09/03/2020 11:31 AM    RBC 3.82 (L) 09/03/2020 11:31 AM    HEMOGLOBIN 11.2 (L) 09/03/2020 11:31 AM    HEMATOCRIT 35.0 (L) 09/03/2020 11:31 AM       Lab Results   Component Value Date/Time    HBA1C 5.7 (H) 11/09/2018 06:30 PM           Culture:   Obtained 9/2/2020; (+) MRSA, beta hemolytic strep A, diphteroids.     INTERVENTIONS BY WOUND TEAM:  Asked patient to lay on his stomach. Raised pajama pant leg and Tubigrip. Removed compression wrap and dressing. Cleaned wound with wound cleanser. Applied skin prep and paste ring to periwound skin, then one black Veraflo foam and second small black foam to wound bed, draped, attached TRAC pad and resumed VAC at above setting. Placed 2 layer compression wrap to lower leg, and adjused Tubgrip back over thigh    Interdisciplinary consultation: Patient, patient     EVALUATION: wound bed 100% red granular tissue. No maceration noted to periwound skin.    Goals: Steady decrease in wound area and depth weekly.     NURSING PLAN OF CARE ORDERS (X):  Dressing changes: See Dressing Care orders: X  Skin care: See Skin Care orders: NA  Rectal tube care: See Rectal Tube Care orders:        Other orders:                             WOUND TEAM PLAN OF CARE (X):   Dressing changes by wound team:          Follow up 1-2 times weekly:            Follow up 3 times weekly:                NPWT change 3 times weekly: X    Follow up as needed:       Other (explain):     Anticipated discharge plans (X): Pending  guardianship   LTACH:        SNF/Rehab:                  Home Care:           Outpatient Wound Center:            Self Care:            Other:

## 2020-09-05 NOTE — CARE PLAN
Problem: Safety  Goal: Will remain free from falls  Outcome: PROGRESSING AS EXPECTED  Note: 1:1 sitter     Problem: Pain Management  Goal: Pain level will decrease to patient's comfort goal  Outcome: PROGRESSING AS EXPECTED  Note: Administering pain mediations as ordered (see MAR).      Problem: Psychosocial Needs:  Goal: Level of anxiety will decrease  Outcome: PROGRESSING AS EXPECTED  Note: Providing emotional support as needed. Administering medications as ordered (see MAR).

## 2020-09-06 PROCEDURE — A9270 NON-COVERED ITEM OR SERVICE: HCPCS | Performed by: INTERNAL MEDICINE

## 2020-09-06 PROCEDURE — 700102 HCHG RX REV CODE 250 W/ 637 OVERRIDE(OP): Performed by: HOSPITALIST

## 2020-09-06 PROCEDURE — A9270 NON-COVERED ITEM OR SERVICE: HCPCS | Performed by: NURSE PRACTITIONER

## 2020-09-06 PROCEDURE — 770021 HCHG ROOM/CARE - ISO PRIVATE

## 2020-09-06 PROCEDURE — 700102 HCHG RX REV CODE 250 W/ 637 OVERRIDE(OP): Performed by: NURSE PRACTITIONER

## 2020-09-06 PROCEDURE — A9270 NON-COVERED ITEM OR SERVICE: HCPCS | Performed by: HOSPITALIST

## 2020-09-06 PROCEDURE — 700102 HCHG RX REV CODE 250 W/ 637 OVERRIDE(OP): Performed by: INTERNAL MEDICINE

## 2020-09-06 RX ADMIN — DIVALPROEX SODIUM 125 MG: 125 CAPSULE ORAL at 14:21

## 2020-09-06 RX ADMIN — DIVALPROEX SODIUM 125 MG: 125 CAPSULE ORAL at 20:09

## 2020-09-06 RX ADMIN — Medication 400 MG: at 06:03

## 2020-09-06 RX ADMIN — Medication 400 MG: at 16:15

## 2020-09-06 RX ADMIN — GABAPENTIN 100 MG: 100 CAPSULE ORAL at 06:03

## 2020-09-06 RX ADMIN — LINEZOLID 600 MG: 600 TABLET, FILM COATED ORAL at 06:03

## 2020-09-06 RX ADMIN — OXYCODONE HYDROCHLORIDE 5 MG: 5 TABLET ORAL at 12:42

## 2020-09-06 RX ADMIN — OXYCODONE HYDROCHLORIDE 5 MG: 5 TABLET ORAL at 09:22

## 2020-09-06 RX ADMIN — OXYCODONE HYDROCHLORIDE 5 MG: 5 TABLET ORAL at 20:09

## 2020-09-06 RX ADMIN — GABAPENTIN 100 MG: 100 CAPSULE ORAL at 11:17

## 2020-09-06 RX ADMIN — OXYCODONE HYDROCHLORIDE 5 MG: 5 TABLET ORAL at 16:15

## 2020-09-06 RX ADMIN — RISPERIDONE 0.5 MG: 0.5 TABLET ORAL at 16:15

## 2020-09-06 RX ADMIN — GABAPENTIN 100 MG: 100 CAPSULE ORAL at 16:15

## 2020-09-06 RX ADMIN — RISPERIDONE 0.5 MG: 0.5 TABLET ORAL at 06:03

## 2020-09-06 RX ADMIN — LINEZOLID 600 MG: 600 TABLET, FILM COATED ORAL at 16:15

## 2020-09-06 RX ADMIN — DIVALPROEX SODIUM 125 MG: 125 CAPSULE ORAL at 06:03

## 2020-09-06 RX ADMIN — OXYCODONE HYDROCHLORIDE 5 MG: 5 TABLET ORAL at 06:03

## 2020-09-06 RX ADMIN — AMLODIPINE BESYLATE 5 MG: 5 TABLET ORAL at 06:03

## 2020-09-06 ASSESSMENT — PAIN DESCRIPTION - PAIN TYPE
TYPE: ACUTE PAIN

## 2020-09-06 NOTE — CARE PLAN
Problem: Safety  Goal: Will remain free from falls  Outcome: PROGRESSING AS EXPECTED  Note: Call light/belongings in reach, bed in low position and locked, front wheel walker out of sight, siderails up x 2, pt wearing non-slip footwear, adequate lighting, clutter free environment. Educate on level of risk, oriented to use of call light.     Problem: Pain Management  Goal: Pain level will decrease to patient's comfort goal  Outcome: PROGRESSING AS EXPECTED  Note: Administering pain mediations as ordered (see MAR).  Providing patient with pillows for support, distraction and coffee for comfort.

## 2020-09-07 PROCEDURE — 700102 HCHG RX REV CODE 250 W/ 637 OVERRIDE(OP): Performed by: NURSE PRACTITIONER

## 2020-09-07 PROCEDURE — A9270 NON-COVERED ITEM OR SERVICE: HCPCS | Performed by: INTERNAL MEDICINE

## 2020-09-07 PROCEDURE — 770021 HCHG ROOM/CARE - ISO PRIVATE

## 2020-09-07 PROCEDURE — A9270 NON-COVERED ITEM OR SERVICE: HCPCS | Performed by: NURSE PRACTITIONER

## 2020-09-07 PROCEDURE — 302098 PASTE RING (FLAT): Performed by: NURSE PRACTITIONER

## 2020-09-07 PROCEDURE — A9270 NON-COVERED ITEM OR SERVICE: HCPCS | Performed by: HOSPITALIST

## 2020-09-07 PROCEDURE — 97606 NEG PRS WND THER DME>50 SQCM: CPT

## 2020-09-07 PROCEDURE — 700111 HCHG RX REV CODE 636 W/ 250 OVERRIDE (IP): Performed by: HOSPITALIST

## 2020-09-07 PROCEDURE — 700102 HCHG RX REV CODE 250 W/ 637 OVERRIDE(OP): Performed by: INTERNAL MEDICINE

## 2020-09-07 PROCEDURE — 700102 HCHG RX REV CODE 250 W/ 637 OVERRIDE(OP): Performed by: HOSPITALIST

## 2020-09-07 RX ADMIN — AMLODIPINE BESYLATE 5 MG: 5 TABLET ORAL at 05:33

## 2020-09-07 RX ADMIN — GABAPENTIN 100 MG: 100 CAPSULE ORAL at 05:33

## 2020-09-07 RX ADMIN — LINEZOLID 600 MG: 600 TABLET, FILM COATED ORAL at 16:49

## 2020-09-07 RX ADMIN — OXYCODONE HYDROCHLORIDE 5 MG: 5 TABLET ORAL at 10:28

## 2020-09-07 RX ADMIN — GABAPENTIN 100 MG: 100 CAPSULE ORAL at 10:28

## 2020-09-07 RX ADMIN — DIVALPROEX SODIUM 125 MG: 125 CAPSULE ORAL at 21:02

## 2020-09-07 RX ADMIN — DIVALPROEX SODIUM 125 MG: 125 CAPSULE ORAL at 05:33

## 2020-09-07 RX ADMIN — GABAPENTIN 100 MG: 100 CAPSULE ORAL at 16:49

## 2020-09-07 RX ADMIN — OXYCODONE HYDROCHLORIDE 5 MG: 5 TABLET ORAL at 16:49

## 2020-09-07 RX ADMIN — OXYCODONE HYDROCHLORIDE 5 MG: 5 TABLET ORAL at 21:02

## 2020-09-07 RX ADMIN — Medication 400 MG: at 05:33

## 2020-09-07 RX ADMIN — RISPERIDONE 0.5 MG: 0.5 TABLET ORAL at 16:49

## 2020-09-07 RX ADMIN — OXYCODONE HYDROCHLORIDE 5 MG: 5 TABLET ORAL at 05:32

## 2020-09-07 RX ADMIN — Medication 400 MG: at 16:49

## 2020-09-07 RX ADMIN — OXYCODONE HYDROCHLORIDE 5 MG: 5 TABLET ORAL at 13:45

## 2020-09-07 RX ADMIN — LINEZOLID 600 MG: 600 TABLET, FILM COATED ORAL at 05:33

## 2020-09-07 RX ADMIN — RISPERIDONE 0.5 MG: 0.5 TABLET ORAL at 05:33

## 2020-09-07 RX ADMIN — DIVALPROEX SODIUM 125 MG: 125 CAPSULE ORAL at 13:45

## 2020-09-07 ASSESSMENT — PAIN DESCRIPTION - PAIN TYPE
TYPE: ACUTE PAIN

## 2020-09-07 ASSESSMENT — LIFESTYLE VARIABLES
DO YOU DRINK ALCOHOL: YES
TOTAL SCORE: 0
ON A TYPICAL DAY WHEN YOU DRINK ALCOHOL HOW MANY DRINKS DO YOU HAVE: 2
CONSUMPTION TOTAL: NEGATIVE
HAVE PEOPLE ANNOYED YOU BY CRITICIZING YOUR DRINKING: NO
DOES PATIENT WANT TO STOP DRINKING: NO
AVERAGE NUMBER OF DAYS PER WEEK YOU HAVE A DRINK CONTAINING ALCOHOL: 6
HAVE YOU EVER FELT YOU SHOULD CUT DOWN ON YOUR DRINKING: NO
TOTAL SCORE: 0
TOTAL SCORE: 0
HOW MANY TIMES IN THE PAST YEAR HAVE YOU HAD 5 OR MORE DRINKS IN A DAY: 0
EVER HAD A DRINK FIRST THING IN THE MORNING TO STEADY YOUR NERVES TO GET RID OF A HANGOVER: NO
EVER FELT BAD OR GUILTY ABOUT YOUR DRINKING: NO

## 2020-09-07 NOTE — WOUND TEAM
[facility]  Inpatient Services  Wound and Skin Care Progress Note    HPI, PMH, SH: Reviewed    Unit where seen by Wound Team: T309/00     WOUND CONSULT RELATED TO:  Left leg wound    Subjective: reports he appreciates everything everyone does. Asked questions about what the dressing does.    Self Report / Pain Level:  No complaint of pain. Expressed discomfort when foam removed.     OBJECTIVE:  Returned from bathroom with VAC    WOUND TYPE, LOCATION, CHARACTERISTICS (Pressure Injuries: location, stage, POA or date identified)         Negative Pressure Wound Therapy 09/03/20 Leg Lower;Medial;Posterior Left (Active)   NPWT Pump Mode / Pressure Setting Ulta;Continuous;125 mmHg 09/07/20 1600   Dressing Type Black Foam (Veraflo) 09/07/20 1600   Number of Foam Pieces Used 2 09/07/20 1600   Canister Changed Yes 09/07/20 1600   Output (mL) 500 mL 09/07/20 1600   NEXT Dressing Change/Treatment Date 09/09/20 09/07/20 1600   VAC VeraFlo Irrigant Normal Saline 09/07/20 1600   VAC VeraFlo Soak Time (mins) 6 09/07/20 1600   VAC VeraFlo Instill Volume (ml) 32 09/07/20 1600   VAC VeraFlo - Therapy Time (hrs) 2 09/07/20 1600   VAC VeraFlo Pressure (mm/Hg) Continuous;125 mmHg 09/07/20 1600           Wound 08/07/20 Full Thickness Wound Leg LLE posterior, extends medially and laterally (Active)   Wound Image     09/01/20 1300   Site Assessment Red;Pink;White 09/07/20 1600   Periwound Assessment Clean;Dry;Intact 09/07/20 1600   Margins Attached edges;Defined edges 09/07/20 1600   Closure Secondary intention 09/07/20 1600   Drainage Amount Large 09/07/20 1600   Drainage Description Serosanguineous 09/07/20 1600   Treatments Cleansed;Irrigation;Site care 09/07/20 1600   Wound Cleansing Approved Wound Cleanser 09/07/20 1600   Periwound Protectant Skin Protectant Wipes to Periwound;Paste Ring;Drape 09/07/20 1600   Dressing Cleansing/Solutions Normal Saline 09/07/20 1600   Dressing Options Wound Vac;Compression Wrap Two Layer  09/07/20 1600   Dressing Changed Changed 09/07/20 1600   Dressing Status Clean;Dry;Intact 09/07/20 1600   Dressing Change/Treatment Frequency By Wound Team Only 09/07/20 1600   NEXT Dressing Change/Treatment Date 09/09/20 09/07/20 1600   NEXT Weekly Photo (Inpatient Only) 09/09/20 09/07/20 1600   Non-staged Wound Description Full thickness 09/05/20 1000   Wound Length (cm) 42 cm 09/01/20 1300   Wound Width (cm) 15 cm 09/01/20 1300   Wound Depth (cm) 0.2 cm 09/01/20 1300   Wound Surface Area (cm^2) 630 cm^2 09/01/20 1300   Wound Volume (cm^3) 126 cm^3 09/01/20 1300   Post-Procedure Length (cm) 42 cm 09/01/20 1300   Post-Procedure Width (cm) 16 cm 09/01/20 1300   Post-Procedure Depth (cm) 0.5 cm 09/01/20 1300   Post-Procedure Surface Area (cm^2) 672 cm^2 09/01/20 1300   Post-Procedure Volume (cm^3) 336 cm^3 09/01/20 1300   Wound Healing % -139 09/01/20 1300   Wound Bed Granulation (%) 100 % 09/05/20 1000   Wound Bed Epithelium (%) 0 % 09/01/20 1300   Wound Bed Slough (%) 60 % 09/01/20 1300   Wound Bed Granulation (%) - Post-Procedure 0 % 09/01/20 1300   Wound Bed Epithelium % - (Post-Procedure) 0 % 09/01/20 1300   Wound Bed Slough % - (Post-Procedure) 0 % 09/01/20 1300   Wound Bed Eschar % - (Post-Procedure) 0 % 09/01/20 1300   Tunneling (cm) 0 cm 09/03/20 1300   Undermining (cm) 0 cm 09/03/20 1300   Shape Irregular 09/07/20 1600   Wound Odor None 09/07/20 1600   Pulses Left;2+;DP;PT 08/26/20 0900   Exposed Structures None 09/07/20 1600   WOUND NURSE ONLY - Time Spent with Patient (mins) 90 09/05/20 1000          Vascular:    Dorsal Pedal pulses: NT  Posterior tib pulses:  NT    KOSTAS:   Performed 9/2/20 Normal bilateral KOSTAS's. R = 1.18; Left = 1.09       Lab Values:    Lab Results   Component Value Date/Time    WBC 6.5 09/03/2020 11:31 AM    RBC 3.82 (L) 09/03/2020 11:31 AM    HEMOGLOBIN 11.2 (L) 09/03/2020 11:31 AM    HEMATOCRIT 35.0 (L) 09/03/2020 11:31 AM       Lab Results   Component Value Date/Time    HBA1C 5.7  (H) 11/09/2018 06:30 PM           Culture:   Obtained 9/2/2020; (+) MRSA, beta hemolytic strep A, diphteroids.     INTERVENTIONS BY WOUND TEAM:  Chart reviewed.  Patient laid on the bed on his stomach.  Patient's pants removed and towel placed on patient's back side.  Tubigrib was moved. Removed compression wrap and dressing. Cleaned wound with wound cleanser. Applied skin prep and paste ring to periwound skin, then one black Veraflo foam and second small black foam to wound bed, draped, attached TRAC pad and resumed VAC at above setting. Placed 2 layer compression wrap to lower leg, and adjused Tubgrip back over thigh    Interdisciplinary consultation: Patient, Bedside RN (Yong)    EVALUATION: wound bed 100% red granular tissue. No maceration noted to periwound skin.    Goals: Steady decrease in wound area and depth weekly.     NURSING PLAN OF CARE ORDERS (X):  Dressing changes: See Dressing Care orders: X  Skin care: See Skin Care orders: NA  Rectal tube care: See Rectal Tube Care orders:        Other orders:                             WOUND TEAM PLAN OF CARE (X):   Dressing changes by wound team:          Follow up 1-2 times weekly:            Follow up 3 times weekly:                NPWT change 3 times weekly: X    Follow up as needed:       Other (explain):     Anticipated discharge plans (X): Pending guardianship   LTACH:        SNF/Rehab:                  Home Care:           Outpatient Wound Center:            Self Care:            Other:

## 2020-09-07 NOTE — PROGRESS NOTES
Report received   Assumed care of patient at 1915  Pt is A&O x 2, disoriented to time/situation  Pain reported at 10/10 to RLE - pain medication given  Denies N/V/D, Denies chest pain, denies dizziness, denies SOB  Tolerating Diet   Wound vac to RLE - CDI, no air leak  Last BM 9/5  Passing flatus    Up self - sitter at bedside  Bed in lowest position and locked.  Pt resting comfortably now.  Review plan of care with patient  Call light within reach  Hourly rounds in place  All needs met at this time

## 2020-09-07 NOTE — DISCHARGE PLANNING
"Care Transition Team Assessment  NOK: None.   This RN case manager notified by bedside RN that patient would like to speak with case management. This RN CM completed CTT assessment with patient. Patient is oriented x4, but exhibiting confusion intermittently during the assessment. Patient states he is homeless prior to this hospitalization and had concerns about [facility] \"kicking [him] out.\" This RN CM informed patient that we are working on a safe discharge plan for him, pending guardian ship and placement to a group home. Patient agreeable to this plan, has concerns about \"fitting in.\" Patient states he does not have any next of kin, friends, or family. Patient states that he is mostly independently with his ADLs and IADLS, he states that his \"left leg is messed up, but I can walk.\" He states that he currently has no monthly income, and would like information on \"how to get social security.\" This RN CM gave him the number for the SS office on [email or web address]     DC Plan: Patient to group home pending guardianship and placement.       Information Source  Orientation : Oriented x 4(patient oriented to event, person, place and time.)  Information Given By: Patient  Who is responsible for making decisions for patient? : Guardian  Name(s) of Primary Decision Maker: (pending guardianship)    Readmission Evaluation  Is this a readmission?: No    Elopement Risk  Legal Hold: Elopement Risk  Ambulatory or Self Mobile in Wheelchair: Yes  Disoriented: Time-At Risk for Elopement  Psychiatric Symptoms: None  History of Wandering: No  Elopement this Admit: No  Vocalizing Wanting to Leave: No  Displays Behaviors, Body Language Wanting to Leave: No-Not at Risk for Elopement  Elopement Risk: At Risk for Elopement  Wanderguard On: No (See Comments)(1:1 sitter)  Personal Belongings: Hospital Clothing Only  Environmental Precautions: Dietary Notified for Plastic Utensils / Paperware Only    Interdisciplinary Discharge Planning  Primary " "Care Physician: none  Lives with - Patient's Self Care Capacity: Alone and Unable to Care For Self  Patient or legal guardian wants to designate a caregiver: (pending)  Support Systems: None  Housing / Facility: Homeless  Do You Take your Prescribed Medications Regularly: Yes  Able to Return to Previous ADL's: Yes  Mobility Issues: Yes  Prior Services: None  Patient Prefers to be Discharged to:: group home  Assistance Needed: Unknown at this Time    Discharge Preparedness  What is your plan after discharge?: [facility]  What are your discharge supports?: (none)  Prior Functional Level: Ambulatory, Independent with Activities of Daily Living, Independent with Medication Management  Difficulity with ADLs: Walking(does not have DME, \"my left leg is messed up but I can walk\")    Functional Assesment  Prior Functional Level: Ambulatory, Independent with Activities of Daily Living, Independent with Medication Management    Finances  Financial Barriers to Discharge: Yes  Average Monthly Income: 0 $  Source of Income: None(\"I'm open to social security\")  Prescription Coverage: Yes    Vision / Hearing Impairment  Vision Impairment : Yes  Right Eye Vision: Impaired  Left Eye Vision: Impaired  Hearing Impairment : No  Hearing Impairment: Both Ears         Advance Directive  Advance Directive?: None    Domestic Abuse  Have you ever been the victim of abuse or violence?: No  Physical Abuse or Sexual Abuse: No  Verbal Abuse or Emotional Abuse: No  Possible Abuse/Neglect Reported to:: Not Applicable    Psychological Assessment  History of Substance Abuse: Alcohol(per chart review. Patient denies)  History of Psychiatric Problems: Yes(per chart review, patient denies )    Discharge Risks or Barriers  Discharge risks or barriers?: No PCP, Transportation, Mental health, Post-acute placement / services, Lives alone, no community support, Homeless / couch surfing  Patient risk factors: Cognitive / sensory / physical deficit, Homeless, " Lack of outside supports, Lives alone and no community support, No PCP, Vulnerable adult    Anticipated Discharge Information  Discharge Address: [address] [place] [address], [place] [address]  Discharge Contact Phone Number: none provided

## 2020-09-07 NOTE — CARE PLAN
Problem: Safety  Goal: Will remain free from falls  Outcome: PROGRESSING AS EXPECTED  Note: 1:1 sitter     Problem: Pain Management  Goal: Pain level will decrease to patient's comfort goal  Outcome: PROGRESSING AS EXPECTED  Note: Administering pain mediations as ordered (see MAR). Providing patient with pillows and coffee for comfort.     Problem: Psychosocial Needs:  Goal: Level of anxiety will decrease  Outcome: PROGRESSING AS EXPECTED  Note: Providing emotional support as needed.

## 2020-09-08 PROCEDURE — 700102 HCHG RX REV CODE 250 W/ 637 OVERRIDE(OP): Performed by: INTERNAL MEDICINE

## 2020-09-08 PROCEDURE — 770021 HCHG ROOM/CARE - ISO PRIVATE

## 2020-09-08 PROCEDURE — A9270 NON-COVERED ITEM OR SERVICE: HCPCS | Performed by: HOSPITALIST

## 2020-09-08 PROCEDURE — A9270 NON-COVERED ITEM OR SERVICE: HCPCS | Performed by: NURSE PRACTITIONER

## 2020-09-08 PROCEDURE — 700111 HCHG RX REV CODE 636 W/ 250 OVERRIDE (IP): Performed by: HOSPITALIST

## 2020-09-08 PROCEDURE — 700102 HCHG RX REV CODE 250 W/ 637 OVERRIDE(OP): Performed by: HOSPITALIST

## 2020-09-08 PROCEDURE — 700102 HCHG RX REV CODE 250 W/ 637 OVERRIDE(OP): Performed by: NURSE PRACTITIONER

## 2020-09-08 PROCEDURE — A9270 NON-COVERED ITEM OR SERVICE: HCPCS | Performed by: INTERNAL MEDICINE

## 2020-09-08 RX ORDER — SODIUM CHLORIDE 9 MG/ML
INJECTION, SOLUTION INTRAVENOUS
Status: ACTIVE
Start: 2020-09-08 | End: 2020-09-09

## 2020-09-08 RX ADMIN — ACETAMINOPHEN 650 MG: 325 TABLET, FILM COATED ORAL at 22:32

## 2020-09-08 RX ADMIN — Medication 400 MG: at 18:26

## 2020-09-08 RX ADMIN — DIVALPROEX SODIUM 125 MG: 125 CAPSULE ORAL at 06:25

## 2020-09-08 RX ADMIN — RISPERIDONE 0.5 MG: 0.5 TABLET ORAL at 18:26

## 2020-09-08 RX ADMIN — Medication 400 MG: at 06:25

## 2020-09-08 RX ADMIN — OXYCODONE HYDROCHLORIDE 5 MG: 5 TABLET ORAL at 12:44

## 2020-09-08 RX ADMIN — RISPERIDONE 0.5 MG: 0.5 TABLET ORAL at 06:24

## 2020-09-08 RX ADMIN — OXYCODONE HYDROCHLORIDE 5 MG: 5 TABLET ORAL at 09:33

## 2020-09-08 RX ADMIN — LINEZOLID 600 MG: 600 TABLET, FILM COATED ORAL at 06:25

## 2020-09-08 RX ADMIN — DIVALPROEX SODIUM 125 MG: 125 CAPSULE ORAL at 13:48

## 2020-09-08 RX ADMIN — GABAPENTIN 100 MG: 100 CAPSULE ORAL at 18:26

## 2020-09-08 RX ADMIN — OXYCODONE HYDROCHLORIDE 5 MG: 5 TABLET ORAL at 21:07

## 2020-09-08 RX ADMIN — GABAPENTIN 100 MG: 100 CAPSULE ORAL at 06:25

## 2020-09-08 RX ADMIN — LINEZOLID 600 MG: 600 TABLET, FILM COATED ORAL at 18:26

## 2020-09-08 RX ADMIN — OXYCODONE HYDROCHLORIDE 5 MG: 5 TABLET ORAL at 16:05

## 2020-09-08 RX ADMIN — ENOXAPARIN SODIUM 40 MG: 40 INJECTION SUBCUTANEOUS at 06:26

## 2020-09-08 RX ADMIN — OXYCODONE HYDROCHLORIDE 5 MG: 5 TABLET ORAL at 06:25

## 2020-09-08 RX ADMIN — DIVALPROEX SODIUM 125 MG: 125 CAPSULE ORAL at 21:07

## 2020-09-08 RX ADMIN — AMLODIPINE BESYLATE 5 MG: 5 TABLET ORAL at 06:25

## 2020-09-08 RX ADMIN — GABAPENTIN 100 MG: 100 CAPSULE ORAL at 12:44

## 2020-09-08 ASSESSMENT — PATIENT HEALTH QUESTIONNAIRE - PHQ9
2. FEELING DOWN, DEPRESSED, IRRITABLE, OR HOPELESS: NOT AT ALL
2. FEELING DOWN, DEPRESSED, IRRITABLE, OR HOPELESS: NOT AT ALL
SUM OF ALL RESPONSES TO PHQ9 QUESTIONS 1 AND 2: 0
1. LITTLE INTEREST OR PLEASURE IN DOING THINGS: NOT AT ALL
SUM OF ALL RESPONSES TO PHQ9 QUESTIONS 1 AND 2: 0
1. LITTLE INTEREST OR PLEASURE IN DOING THINGS: NOT AT ALL

## 2020-09-08 ASSESSMENT — PAIN DESCRIPTION - PAIN TYPE
TYPE: ACUTE PAIN

## 2020-09-08 NOTE — CARE PLAN
Problem: Safety  Goal: Will remain free from falls  Outcome: PROGRESSING AS EXPECTED  Note: Sitter at bedside. Bed locked and in lowest position. Plastic utensils only.     Problem: Infection  Goal: Will remain free from infection  Outcome: PROGRESSING AS EXPECTED  Note: Pt educated on hand hygiene. Pt afebrile.

## 2020-09-08 NOTE — CARE PLAN
Problem: Safety  Goal: Will remain free from falls  Outcome: PROGRESSING AS EXPECTED   Standard precautions in effect.  Call light personal belongings within reach, siderails up X 2, adequate lighting, clutter free environment, educated on level of risk, reminded to call for assistance.  Hourly rounding in effect.     Problem: Knowledge Deficit  Goal: Knowledge of disease process/condition, treatment plan, diagnostic tests, and medications will improve  Outcome: PROGRESSING AS EXPECTED   Discussed plan of care.  Questions answered.  Verbalized understanding.

## 2020-09-09 PROCEDURE — A9270 NON-COVERED ITEM OR SERVICE: HCPCS | Performed by: NURSE PRACTITIONER

## 2020-09-09 PROCEDURE — 700102 HCHG RX REV CODE 250 W/ 637 OVERRIDE(OP): Performed by: NURSE PRACTITIONER

## 2020-09-09 PROCEDURE — A9270 NON-COVERED ITEM OR SERVICE: HCPCS | Performed by: INTERNAL MEDICINE

## 2020-09-09 PROCEDURE — 770021 HCHG ROOM/CARE - ISO PRIVATE

## 2020-09-09 PROCEDURE — A9270 NON-COVERED ITEM OR SERVICE: HCPCS | Performed by: HOSPITALIST

## 2020-09-09 PROCEDURE — 700102 HCHG RX REV CODE 250 W/ 637 OVERRIDE(OP): Performed by: INTERNAL MEDICINE

## 2020-09-09 PROCEDURE — 700111 HCHG RX REV CODE 636 W/ 250 OVERRIDE (IP): Performed by: HOSPITALIST

## 2020-09-09 PROCEDURE — 97606 NEG PRS WND THER DME>50 SQCM: CPT

## 2020-09-09 PROCEDURE — 302098 PASTE RING (FLAT): Performed by: NURSE PRACTITIONER

## 2020-09-09 PROCEDURE — 700102 HCHG RX REV CODE 250 W/ 637 OVERRIDE(OP): Performed by: HOSPITALIST

## 2020-09-09 RX ORDER — AMOXICILLIN 250 MG
2 CAPSULE ORAL 2 TIMES DAILY PRN
Status: DISCONTINUED | OUTPATIENT
Start: 2020-09-09 | End: 2021-04-01

## 2020-09-09 RX ORDER — BISACODYL 10 MG
10 SUPPOSITORY, RECTAL RECTAL
Status: DISCONTINUED | OUTPATIENT
Start: 2020-09-09 | End: 2020-09-10

## 2020-09-09 RX ORDER — RISPERIDONE 0.5 MG/1
1 TABLET ORAL EVERY EVENING
Status: DISCONTINUED | OUTPATIENT
Start: 2020-09-09 | End: 2021-05-03 | Stop reason: HOSPADM

## 2020-09-09 RX ORDER — POLYETHYLENE GLYCOL 3350 17 G/17G
1 POWDER, FOR SOLUTION ORAL
Status: DISCONTINUED | OUTPATIENT
Start: 2020-09-09 | End: 2021-01-13 | Stop reason: ALTCHOICE

## 2020-09-09 RX ORDER — RISPERIDONE 0.5 MG/1
0.5 TABLET ORAL DAILY
Status: DISCONTINUED | OUTPATIENT
Start: 2020-09-10 | End: 2021-05-03 | Stop reason: HOSPADM

## 2020-09-09 RX ADMIN — Medication 400 MG: at 16:18

## 2020-09-09 RX ADMIN — LINEZOLID 600 MG: 600 TABLET, FILM COATED ORAL at 06:18

## 2020-09-09 RX ADMIN — GABAPENTIN 100 MG: 100 CAPSULE ORAL at 06:18

## 2020-09-09 RX ADMIN — ACETAMINOPHEN 650 MG: 325 TABLET, FILM COATED ORAL at 16:17

## 2020-09-09 RX ADMIN — DIVALPROEX SODIUM 125 MG: 125 CAPSULE ORAL at 13:55

## 2020-09-09 RX ADMIN — DIVALPROEX SODIUM 125 MG: 125 CAPSULE ORAL at 06:17

## 2020-09-09 RX ADMIN — LINEZOLID 600 MG: 600 TABLET, FILM COATED ORAL at 16:17

## 2020-09-09 RX ADMIN — RISPERIDONE 0.5 MG: 0.5 TABLET ORAL at 06:18

## 2020-09-09 RX ADMIN — OXYCODONE HYDROCHLORIDE 5 MG: 5 TABLET ORAL at 19:52

## 2020-09-09 RX ADMIN — GABAPENTIN 100 MG: 100 CAPSULE ORAL at 16:17

## 2020-09-09 RX ADMIN — OXYCODONE HYDROCHLORIDE 5 MG: 5 TABLET ORAL at 02:24

## 2020-09-09 RX ADMIN — RISPERIDONE 1 MG: 1 TABLET ORAL at 16:18

## 2020-09-09 RX ADMIN — Medication 400 MG: at 06:18

## 2020-09-09 RX ADMIN — OXYCODONE HYDROCHLORIDE 5 MG: 5 TABLET ORAL at 11:46

## 2020-09-09 RX ADMIN — OXYCODONE HYDROCHLORIDE 5 MG: 5 TABLET ORAL at 16:17

## 2020-09-09 RX ADMIN — ACETAMINOPHEN 650 MG: 325 TABLET, FILM COATED ORAL at 11:45

## 2020-09-09 RX ADMIN — DIVALPROEX SODIUM 125 MG: 125 CAPSULE ORAL at 21:51

## 2020-09-09 RX ADMIN — AMLODIPINE BESYLATE 5 MG: 5 TABLET ORAL at 06:18

## 2020-09-09 RX ADMIN — GABAPENTIN 100 MG: 100 CAPSULE ORAL at 11:46

## 2020-09-09 ASSESSMENT — PAIN DESCRIPTION - PAIN TYPE
TYPE: CHRONIC PAIN
TYPE: CHRONIC PAIN
TYPE: ACUTE PAIN
TYPE: CHRONIC PAIN

## 2020-09-09 NOTE — CARE PLAN
Problem: Safety  Goal: Will remain free from falls  Outcome: PROGRESSING AS EXPECTED   Safety precautions in effect.  Call light within reach, bed locked and at lowest position, siderails up X 2, adequate lighting, clutter free environment. Educated on level of risk, reminded to call for assistance - 1:1 sitter in place.  Hourly rounding in effect.     Problem: Psychosocial Needs:  Goal: Level of anxiety will decrease  Outcome: PROGRESSING AS EXPECTED   Allowed patient to talk about his feelings, educated on deep breathing techniques to cope with anxiety. Encouraged distraction and expressing feelings for emotional support. Behavioral health legal hold in effect.  2-1 sitter at bedside.  Placed Room Safety Checklist for Patient on Legal 2000 outside patients' door and completed check off list.  Will continue to monitor PRN

## 2020-09-09 NOTE — WOUND TEAM
[facility]  Inpatient Services  Wound and Skin Care Progress Note    HPI, PMH, SH: Reviewed    Unit where seen by Wound Team: T309/00     WOUND CONSULT RELATED TO:  Left leg wound    Subjective: reports he appreciates everything everyone does. Asked questions about what the dressing does.    Self Report / Pain Level:  No complaint of pain. Pre-medicated with PO oxy 5mg prior to dressing removal.     OBJECTIVE:  Patient sitting in bed with intact dressing.     WOUND TYPE, LOCATION, CHARACTERISTICS (Pressure Injuries: location, stage, POA or date identified)       Negative Pressure Wound Therapy 09/03/20 Leg Lower;Medial;Posterior Left (Active)   NPWT Pump Mode / Pressure Setting Ulta;Continuous;125 mmHg 09/09/20 1300   Dressing Type Black Foam (Regular) 09/09/20 1300   Number of Foam Pieces Used 1 09/09/20 1300   Canister Changed No 09/09/20 1300   Output (mL) 475 mL 09/08/20 1346   NEXT Dressing Change/Treatment Date 09/11/20 09/09/20 1300   VAC VeraFlo Irrigant Normal Saline 09/09/20 1300   VAC VeraFlo Soak Time (mins) 6 09/09/20 1300   VAC VeraFlo Instill Volume (ml) 32 09/09/20 1300   VAC VeraFlo - Therapy Time (hrs) 2 09/09/20 1300   VAC VeraFlo Pressure (mm/Hg) Continuous;125 mmHg 09/09/20 1300           Wound 08/07/20 Full Thickness Wound Leg LLE posterior, extends medially and laterally (Active)   Wound Image      09/09/20 1300   Site Assessment Red;Yellow 09/09/20 1300   Periwound Assessment Clean;Dry;Intact 09/09/20 1300   Margins Attached edges;Defined edges 09/09/20 1300   Closure Secondary intention 09/09/20 1300   Drainage Amount Moderate 09/09/20 1300   Drainage Description Serosanguineous 09/09/20 1300   Treatments Cleansed;Irrigation;Site care 09/09/20 1300   Wound Cleansing Approved Wound Cleanser 09/09/20 1300   Periwound Protectant Benzoin;Paste Ring;Drape 09/09/20 1300   Dressing Cleansing/Solutions Normal Saline 09/09/20 1300   Dressing Options Wound Vac;Compression Wrap Two  Layer 09/09/20 1300   Dressing Changed Changed 09/09/20 1300   Dressing Status Clean;Dry;Intact 09/09/20 1300   Dressing Change/Treatment Frequency By Wound Team Only 09/09/20 1300   NEXT Dressing Change/Treatment Date 09/11/20 09/09/20 1300   NEXT Weekly Photo (Inpatient Only) 09/16/20 09/09/20 1300   Non-staged Wound Description Full thickness 09/09/20 1300   Wound Length (cm) 36 cm 09/09/20 1300   Wound Width (cm) 16 cm 09/09/20 1300   Wound Depth (cm) 0.3 cm 09/09/20 1300   Wound Surface Area (cm^2) 576 cm^2 09/09/20 1300   Wound Volume (cm^3) 172.8 cm^3 09/09/20 1300   Post-Procedure Length (cm) 36 cm 09/09/20 1300   Post-Procedure Width (cm) 16 cm 09/09/20 1300   Post-Procedure Depth (cm) 0.3 cm 09/09/20 1300   Post-Procedure Surface Area (cm^2) 576 cm^2 09/09/20 1300   Post-Procedure Volume (cm^3) 172.8 cm^3 09/09/20 1300   Wound Healing % -228 09/09/20 1300   Wound Bed Granulation (%) 100 % 09/09/20 1300   Wound Bed Epithelium (%) 0 % 09/01/20 1300   Wound Bed Slough (%) 50 % 09/09/20 1300   Wound Bed Granulation (%) - Post-Procedure 0 % 09/01/20 1300   Wound Bed Epithelium % - (Post-Procedure) 0 % 09/01/20 1300   Wound Bed Slough % - (Post-Procedure) 0 % 09/01/20 1300   Wound Bed Eschar % - (Post-Procedure) 0 % 09/01/20 1300   Tunneling (cm) 0 cm 09/03/20 1300   Undermining (cm) 0 cm 09/03/20 1300   Shape irregular 09/09/20 1300   Wound Odor None 09/09/20 1300   Pulses Left;2+;DP;PT 09/09/20 1300   Exposed Structures None 09/09/20 1300   WOUND NURSE ONLY - Time Spent with Patient (mins) 90 09/05/20 1000              Vascular:    Dorsal Pedal pulses: NT  Posterior tib pulses:  NT    KOSTAS:   Performed 9/2/20 Normal bilateral KOSTAS's. R = 1.18; Left = 1.09       Lab Values:    Lab Results   Component Value Date/Time    WBC 6.5 09/03/2020 11:31 AM    RBC 3.82 (L) 09/03/2020 11:31 AM    HEMOGLOBIN 11.2 (L) 09/03/2020 11:31 AM    HEMATOCRIT 35.0 (L) 09/03/2020 11:31 AM       Lab Results   Component Value Date/Time     HBA1C 5.7 (H) 11/09/2018 06:30 PM             Culture:   Obtained 9/2/2020; (+) MRSA, beta hemolytic strep A, diphteroids.     INTERVENTIONS BY WOUND TEAM:  Chart reviewed.  Patient laid on the bed on his stomach.  Patient's pants removed and towel placed on patient's back side.  Tubigrib was moved. Removed compression wrap and dressing. CSWD with curette to remove ~100cm2 of non-viable slough from wound bed.  Cleaned wound with wound cleanser. Applied skin prep and paste ring to periwound skin, then one black Veraflo foam and second small black foam to wound bed, draped, attached TRAC pad and resumed VAC at above setting. Placed 2 layer compression wrap to lower leg, and adjused Tubgrip back over thigh    Interdisciplinary consultation: Patient, Bedside RN (Karen)    EVALUATION: wound bed 100% red granular tissue. No maceration noted to periwound skin.    Goals: Steady decrease in wound area and depth weekly.     NURSING PLAN OF CARE ORDERS (X):  Dressing changes: See Dressing Care orders: X  Skin care: See Skin Care orders: NA  Rectal tube care: See Rectal Tube Care orders:        Other orders:                             WOUND TEAM PLAN OF CARE (X):   Dressing changes by wound team:          Follow up 1-2 times weekly:            Follow up 3 times weekly:                NPWT change 3 times weekly: X    Follow up as needed:       Other (explain):     Anticipated discharge plans (X): Pending guardianship   LTACH:        SNF/Rehab:                  Home Care:           Outpatient Wound Center:            Self Care:            Other:

## 2020-09-09 NOTE — PROGRESS NOTES
[facility] Medicine  Weekly Progress Note    Date of Service  9/9/2020    Chief Complaint  LLE wound    Hospital Course   Mr. Varela is a 64-year-old male with PMH significant for homelessness, etoh use, tobacco dependence and chronic LLE wound who presented 8/7/2020 with LLE wound infection.  He was hospitalized at our facility in April and evaluated by orthopedic surgery who recommended wound care.  Wound cultures at that time grew MSSA and strep.  Patient reported losing his Medicaid card and having no transportation to wound clinic.  He was seen at [facility] earlier this week and prescribed ABX, however he has not filled the prescription.  US LLE negative for DVT.  Treated for sepsis with empiric ABX and wound care.  He was found to have head lice and underwent treatments. Continued to have intermittent agitation so was started on risperdol, depakote and gabapentin per psych recs.  He has been deemed incapacitated to make medical decisions and is currently pending guardianship and placement.  He is medically stable and will be only be evaluated 2 times weekly unless there is a clinical change.          Interval Problem Update  Patient is ambulating in the room, pleasant and cooperative.  Zyvox thru 9/16. Last CBC 9/3 w/o major abnls.  Complains of insomnia    Consultants/Specialty  Psychiatry    Code Status  Full Code    Disposition  Homeless. 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 He can't remember the last time he had his dressing changed OP  -LLE US (-) DVT  -wound care  -10 days of augmentin completed 8/21  -wound still extremely painful and with yellow drainage, edema and erythema.   -Previous cultures grew MSSA and group A strep.  Wound cultures  9/2:  Culture Result Abnormal   Beta Hemolytic Streptococcus group A   Moderate growth     Culture Result Abnormal   Methicillin Resistant Staphylococcus aureus   Moderate growth    -blood cultures (-) to date  -afebrile. Leukocytosis resolved  -started Zyvox 9/2 in consultation with pharmacy team. Continue.    Encephalopathy- (present on admission)  Assessment & Plan  -acute on chronic  -likely due to significant EtOH history.  Possible component of Warnicke's encephalopathy  -Psychiatry evaluated him and deemed him incapacitated to make medical decision or leave AMA and recommend SLP cognitive eval.  Patient will likely require guardianship and placement  -CT head unremarkable  -no acute neurological deficits  -Avoid narcotics and hypnotics.  Cautious use of benzodiazepines for alcohol withdrawal protocol  -Frequent reorientation  -Sleep hygiene    Non-compliance  Assessment & Plan  -likely also component of psychiatric disorder and ETOH abuse  -Psychiatric consulted and suggests patient is incapacitated to make medical decisions or leave AMA  -ongoing encouragement for compliance.    Psychiatric disorder  Assessment & Plan  -unknown/unspecified  -not currently on medication  -history of severe alcohol dependence.  -psychiatry consulted for agitation - incapacitated to leave AMA or make medical decisions, appreciate recs: risperdol 0.5 -1mg BID, cont depakote 125mg TID (8/15/20, watch LFTs), added neurotin 100mg TID 8/16  -Pending guardianship. Application to be submitted 9/3.    Flexion contracture of knee, 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)  Assessment & Plan  -secondary to chronic wound in that area  -PT OT  -encourage mobility    Emphysema/COPD (HCC)  Assessment & Plan  Hyperinflated lungs on Chest x-ray  Pursed-lip breathing  Saturating well on room air    Head lice  Assessment & Plan  Head lice found  S/p first round of treatment 8/10, treated again on 8/13 given active lice seen by CNA, will need second treatment  "8/20. Given 3rd treatment.  If still having issues will likely need to shave patient    Alcohol dependence (HCC)- (present on admission)  Assessment & Plan  -history of.  Ongoing.  He reports drinking \"once in a while\"  -s/p WD, was on CIWA, uncomplicated  -MVI and thiamine    Protein malnutrition (HCC)  Assessment & Plan  -history of ETOH and homelessness  -dietary consult  -TID supplements  -encourage PO intake    Sepsis (HCC)- (present on admission)  Assessment & Plan  -This is Sepsis Present on admission  SIRS criteria identified on my evaluation include: Leukocytosis, with WBC greater than 12,000   Source is cellulitis and wound infection  Sepsis protocol initiated  Fluid resuscitation ordered per protocol  IV antibiotics as appropriate for source of sepsis  While organ dysfunction may be noted elsewhere in this problem list or in the chart, degree of organ dysfunction does not meet CMS criteria for severe sepsis  -afebrile  -lactic WNL  -blood cultures negative to date  -Sepsis has resolved    Hypomagnesemia  Assessment & Plan  -Likely related to EtOH abuse history  -Resolved    Hypokalemia- (present on admission)  Assessment & Plan  -Also with hypomagnesemia  -Resolved after replacement    Tobacco dependence- (present on admission)  Assessment & Plan  -Nicotine replacement protocol       VTE prophylaxis: Enoxaparin    Demarcus Mccabe, A.P.N.    I certify that the patient requires continued medically necessary hospital services for the treatment of psychosis with neurocognitive disorder. The patient will remain in the hospital for the foreseeable future.  Discharge may or may not occur in the next 20 days due to ongoing discharge delays due to no medically acceptable discharge option.          "

## 2020-09-10 PROCEDURE — 92526 ORAL FUNCTION THERAPY: CPT

## 2020-09-10 PROCEDURE — A9270 NON-COVERED ITEM OR SERVICE: HCPCS | Performed by: NURSE PRACTITIONER

## 2020-09-10 PROCEDURE — 700102 HCHG RX REV CODE 250 W/ 637 OVERRIDE(OP): Performed by: NURSE PRACTITIONER

## 2020-09-10 PROCEDURE — 700111 HCHG RX REV CODE 636 W/ 250 OVERRIDE (IP): Performed by: HOSPITALIST

## 2020-09-10 PROCEDURE — 700105 HCHG RX REV CODE 258

## 2020-09-10 PROCEDURE — 700102 HCHG RX REV CODE 250 W/ 637 OVERRIDE(OP): Performed by: HOSPITALIST

## 2020-09-10 PROCEDURE — A9270 NON-COVERED ITEM OR SERVICE: HCPCS | Performed by: INTERNAL MEDICINE

## 2020-09-10 PROCEDURE — A9270 NON-COVERED ITEM OR SERVICE: HCPCS | Performed by: HOSPITALIST

## 2020-09-10 PROCEDURE — 700102 HCHG RX REV CODE 250 W/ 637 OVERRIDE(OP): Performed by: INTERNAL MEDICINE

## 2020-09-10 PROCEDURE — 770021 HCHG ROOM/CARE - ISO PRIVATE

## 2020-09-10 RX ORDER — OXYCODONE HYDROCHLORIDE 5 MG/1
2.5 TABLET ORAL
Status: DISCONTINUED | OUTPATIENT
Start: 2020-09-10 | End: 2020-10-17

## 2020-09-10 RX ORDER — SODIUM CHLORIDE 9 MG/ML
INJECTION, SOLUTION INTRAVENOUS
Status: ACTIVE
Start: 2020-09-10 | End: 2020-09-10

## 2020-09-10 RX ORDER — SODIUM CHLORIDE 9 MG/ML
INJECTION, SOLUTION INTRAVENOUS
Status: COMPLETED
Start: 2020-09-10 | End: 2020-09-10

## 2020-09-10 RX ORDER — OXYCODONE HYDROCHLORIDE 5 MG/1
5 TABLET ORAL
Status: DISCONTINUED | OUTPATIENT
Start: 2020-09-10 | End: 2020-10-17

## 2020-09-10 RX ADMIN — RISPERIDONE 1 MG: 1 TABLET ORAL at 16:51

## 2020-09-10 RX ADMIN — OXYCODONE HYDROCHLORIDE 5 MG: 5 TABLET ORAL at 08:19

## 2020-09-10 RX ADMIN — RISPERIDONE 0.5 MG: 0.5 TABLET ORAL at 04:50

## 2020-09-10 RX ADMIN — DIVALPROEX SODIUM 125 MG: 125 CAPSULE ORAL at 13:19

## 2020-09-10 RX ADMIN — GABAPENTIN 100 MG: 100 CAPSULE ORAL at 13:19

## 2020-09-10 RX ADMIN — OXYCODONE HYDROCHLORIDE 5 MG: 5 TABLET ORAL at 16:51

## 2020-09-10 RX ADMIN — Medication 400 MG: at 04:49

## 2020-09-10 RX ADMIN — GABAPENTIN 100 MG: 100 CAPSULE ORAL at 16:51

## 2020-09-10 RX ADMIN — Medication 400 MG: at 16:51

## 2020-09-10 RX ADMIN — DIVALPROEX SODIUM 125 MG: 125 CAPSULE ORAL at 04:50

## 2020-09-10 RX ADMIN — LINEZOLID 600 MG: 600 TABLET, FILM COATED ORAL at 04:50

## 2020-09-10 RX ADMIN — ACETAMINOPHEN 650 MG: 325 TABLET, FILM COATED ORAL at 08:19

## 2020-09-10 RX ADMIN — OXYCODONE HYDROCHLORIDE 5 MG: 5 TABLET ORAL at 04:50

## 2020-09-10 RX ADMIN — OXYCODONE HYDROCHLORIDE 5 MG: 5 TABLET ORAL at 20:26

## 2020-09-10 RX ADMIN — ENOXAPARIN SODIUM 40 MG: 40 INJECTION SUBCUTANEOUS at 04:50

## 2020-09-10 RX ADMIN — ACETAMINOPHEN 650 MG: 325 TABLET, FILM COATED ORAL at 20:26

## 2020-09-10 RX ADMIN — OXYCODONE HYDROCHLORIDE 5 MG: 5 TABLET ORAL at 13:19

## 2020-09-10 RX ADMIN — GABAPENTIN 100 MG: 100 CAPSULE ORAL at 04:50

## 2020-09-10 RX ADMIN — LINEZOLID 600 MG: 600 TABLET, FILM COATED ORAL at 16:51

## 2020-09-10 RX ADMIN — SODIUM CHLORIDE 1000 ML: 9 INJECTION, SOLUTION INTRAVENOUS at 13:20

## 2020-09-10 RX ADMIN — OXYCODONE HYDROCHLORIDE 5 MG: 5 TABLET ORAL at 00:12

## 2020-09-10 RX ADMIN — DIVALPROEX SODIUM 125 MG: 125 CAPSULE ORAL at 20:28

## 2020-09-10 RX ADMIN — AMLODIPINE BESYLATE 5 MG: 5 TABLET ORAL at 04:50

## 2020-09-10 ASSESSMENT — PAIN DESCRIPTION - PAIN TYPE
TYPE: SURGICAL PAIN
TYPE: CHRONIC PAIN
TYPE: CHRONIC PAIN
TYPE: SURGICAL PAIN
TYPE: CHRONIC PAIN
TYPE: SURGICAL PAIN

## 2020-09-10 NOTE — CARE PLAN
Problem: Safety  Goal: Will remain free from falls  Outcome: PROGRESSING AS EXPECTED   Patient educated on safety precautions. Call light and belongings within reach. Sitter at bedside.     Problem: Pain Management  Goal: Pain level will decrease to patient's comfort goal  Outcome: PROGRESSING AS EXPECTED  Patient educated on pharmacological and non-pharmacological pain management interventions.

## 2020-09-10 NOTE — PROGRESS NOTES
High saline output in drain, has had 1L already this shift changed 500ml canister twice, wound team notified [phone].

## 2020-09-10 NOTE — DISCHARGE PLANNING
Anticipated Discharge Disposition: TBD    Action: received phycians assessment for guardianship paperwork.  All paperwork faxed to Teri Madrigal at [phone].    Barriers to Discharge: Public guardian assignment    Plan: f/u with medical teamShruthi Public Guardians

## 2020-09-10 NOTE — CARE PLAN
Problem: Pain Management  Goal: Pain level will decrease to patient's comfort goal  Outcome: PROGRESSING AS EXPECTED  Pain controlled with prn medication.      Problem: Respiratory:  Goal: Respiratory status will improve  Outcome: PROGRESSING AS EXPECTED   Comfortable on room air.

## 2020-09-10 NOTE — PROGRESS NOTES
Pharmacy Pharmacotherapy Consult for LOS >30 days    Admit Date: 8/7/2020      Medications were reviewed for appropriateness and ongoing need.     Current Facility-Administered Medications   Medication Dose Route Frequency Provider Last Rate Last Dose   • SODIUM CHLORIDE 0.9 % IV SOLN            • senna-docusate (PERICOLACE or SENOKOT S) 8.6-50 MG per tablet 2 Tab  2 Tab Oral BID PRN Demarcus QUINTANA Olde, A.P.N.        And   • polyethylene glycol/lytes (MIRALAX) PACKET 1 Packet  1 Packet Oral QDAY PRN Demarcus QUINTANA Olde, A.P.N.        And   • magnesium hydroxide (MILK OF MAGNESIA) suspension 30 mL  30 mL Oral QDAY PRN Demarcus QUINTANA Olde, A.P.N.        And   • bisacodyl (DULCOLAX) suppository 10 mg  10 mg Rectal QDAY PRN Demarcus QUINTANA Olde, A.P.N.       • risperiDONE (RISPERDAL) tablet 0.5 mg  0.5 mg Oral DAILY Demarcus QUINTANA Olde, A.P.N.   0.5 mg at 09/10/20 0450   • risperiDONE (RISPERDAL) tablet 1 mg  1 mg Oral Q EVENING Demarcus QUINTANA Olddallas, A.P.N.   1 mg at 09/09/20 1618   • linezolid (ZYVOX) tablet 600 mg  600 mg Oral Q12HRS Nya Gonzalez, A.P.R.N.   600 mg at 09/10/20 0450   • haloperidol lactate (HALDOL) injection 2-5 mg  2-5 mg Intramuscular Q4HRS PRN Charo Connor, A.P.R.N.       • gabapentin (NEURONTIN) capsule 100 mg  100 mg Oral TID Isaura Tim M.D.   100 mg at 09/10/20 0450   • divalproex (DEPAKOTE SPRINKLE) capsule 125 mg  125 mg Oral Q8HRS Isaura Tim M.D.   125 mg at 09/10/20 0450   • magnesium oxide (MAG-OX) tablet 400 mg  400 mg Oral BID CARLTON Khan.P.R.N.   400 mg at 09/10/20 0449   • amLODIPine (NORVASC) tablet 5 mg  5 mg Oral Q DAY London Alvarado M.D.   5 mg at 09/10/20 0450   • enoxaparin (LOVENOX) inj 40 mg  40 mg Subcutaneous DAILY London Alvarado M.D.   40 mg at 09/10/20 0450   • acetaminophen (TYLENOL) tablet 650 mg  650 mg Oral Q6HRS PRN London Alvarado M.D.   650 mg at 09/10/20 0819   • oxyCODONE immediate-release (ROXICODONE) tablet 2.5 mg  2.5 mg Oral Q3HRS PRN London Alvarado M.D.    Stopped at 08/18/20 0915    And   • oxyCODONE immediate-release (ROXICODONE) tablet 5 mg  5 mg Oral Q3HRS PRN London Alvarado M.D.   5 mg at 09/10/20 0819   • ondansetron (ZOFRAN ODT) dispertab 4 mg  4 mg Oral Q4HRS PRN London Alvarado M.D.           Recommendations:  D/C Milk of magnesia and Dulcolax - has not been needed w/ other options in place  D/C Zofran - has not been needed since admission  Verify continuing need of magnesium replacement - no level checked in past month  Recommend flu shot starting 9/21    Varinder Noel, Pharmacy Intern      Discussed with provider.  CMP/Mg in AM.  Orders updated.    Sandhya Gomez, PharmD, BCPS

## 2020-09-11 LAB
ALBUMIN SERPL BCP-MCNC: 3.9 G/DL (ref 3.2–4.9)
ALBUMIN/GLOB SERPL: 1 G/DL
ALP SERPL-CCNC: 79 U/L (ref 30–99)
ALT SERPL-CCNC: 19 U/L (ref 2–50)
ANION GAP SERPL CALC-SCNC: 14 MMOL/L (ref 7–16)
AST SERPL-CCNC: 18 U/L (ref 12–45)
BILIRUB SERPL-MCNC: <0.2 MG/DL (ref 0.1–1.5)
BUN SERPL-MCNC: 21 MG/DL (ref 8–22)
CALCIUM SERPL-MCNC: 9.1 MG/DL (ref 8.5–10.5)
CHLORIDE SERPL-SCNC: 97 MMOL/L (ref 96–112)
CO2 SERPL-SCNC: 26 MMOL/L (ref 20–33)
CREAT SERPL-MCNC: 0.9 MG/DL (ref 0.5–1.4)
GLOBULIN SER CALC-MCNC: 3.8 G/DL (ref 1.9–3.5)
GLUCOSE SERPL-MCNC: 98 MG/DL (ref 65–99)
MAGNESIUM SERPL-MCNC: 2.1 MG/DL (ref 1.5–2.5)
POTASSIUM SERPL-SCNC: 4.2 MMOL/L (ref 3.6–5.5)
PROT SERPL-MCNC: 7.7 G/DL (ref 6–8.2)
SODIUM SERPL-SCNC: 137 MMOL/L (ref 135–145)

## 2020-09-11 PROCEDURE — 80053 COMPREHEN METABOLIC PANEL: CPT

## 2020-09-11 PROCEDURE — 11042 DBRDMT SUBQ TIS 1ST 20SQCM/<: CPT | Performed by: NURSE PRACTITIONER

## 2020-09-11 PROCEDURE — 11045 DBRDMT SUBQ TISS EACH ADDL: CPT | Performed by: NURSE PRACTITIONER

## 2020-09-11 PROCEDURE — 700111 HCHG RX REV CODE 636 W/ 250 OVERRIDE (IP): Performed by: NURSE PRACTITIONER

## 2020-09-11 PROCEDURE — 83735 ASSAY OF MAGNESIUM: CPT

## 2020-09-11 PROCEDURE — 0JBP0ZZ EXCISION OF LEFT LOWER LEG SUBCUTANEOUS TISSUE AND FASCIA, OPEN APPROACH: ICD-10-PCS | Performed by: ORTHOPAEDIC SURGERY

## 2020-09-11 PROCEDURE — A9270 NON-COVERED ITEM OR SERVICE: HCPCS | Performed by: NURSE PRACTITIONER

## 2020-09-11 PROCEDURE — 700102 HCHG RX REV CODE 250 W/ 637 OVERRIDE(OP): Performed by: NURSE PRACTITIONER

## 2020-09-11 PROCEDURE — 770021 HCHG ROOM/CARE - ISO PRIVATE

## 2020-09-11 PROCEDURE — 302098 PASTE RING (FLAT): Performed by: NURSE PRACTITIONER

## 2020-09-11 PROCEDURE — 36415 COLL VENOUS BLD VENIPUNCTURE: CPT

## 2020-09-11 PROCEDURE — 97606 NEG PRS WND THER DME>50 SQCM: CPT

## 2020-09-11 RX ADMIN — RISPERIDONE 0.5 MG: 0.5 TABLET ORAL at 04:35

## 2020-09-11 RX ADMIN — DIVALPROEX SODIUM 125 MG: 125 CAPSULE ORAL at 04:36

## 2020-09-11 RX ADMIN — OXYCODONE HYDROCHLORIDE 5 MG: 5 TABLET ORAL at 00:32

## 2020-09-11 RX ADMIN — OXYCODONE HYDROCHLORIDE 5 MG: 5 TABLET ORAL at 20:38

## 2020-09-11 RX ADMIN — GABAPENTIN 100 MG: 100 CAPSULE ORAL at 13:35

## 2020-09-11 RX ADMIN — GABAPENTIN 100 MG: 100 CAPSULE ORAL at 04:36

## 2020-09-11 RX ADMIN — Medication 400 MG: at 04:36

## 2020-09-11 RX ADMIN — LINEZOLID 600 MG: 600 TABLET, FILM COATED ORAL at 04:35

## 2020-09-11 RX ADMIN — LINEZOLID 600 MG: 600 TABLET, FILM COATED ORAL at 17:27

## 2020-09-11 RX ADMIN — DIVALPROEX SODIUM 125 MG: 125 CAPSULE ORAL at 21:18

## 2020-09-11 RX ADMIN — OXYCODONE HYDROCHLORIDE 5 MG: 5 TABLET ORAL at 13:35

## 2020-09-11 RX ADMIN — DIVALPROEX SODIUM 125 MG: 125 CAPSULE ORAL at 13:35

## 2020-09-11 RX ADMIN — RISPERIDONE 1 MG: 1 TABLET ORAL at 17:26

## 2020-09-11 RX ADMIN — Medication 400 MG: at 17:27

## 2020-09-11 RX ADMIN — OXYCODONE HYDROCHLORIDE 5 MG: 5 TABLET ORAL at 17:26

## 2020-09-11 RX ADMIN — OXYCODONE HYDROCHLORIDE 5 MG: 5 TABLET ORAL at 10:18

## 2020-09-11 RX ADMIN — AMLODIPINE BESYLATE 5 MG: 5 TABLET ORAL at 04:36

## 2020-09-11 RX ADMIN — OXYCODONE HYDROCHLORIDE 5 MG: 5 TABLET ORAL at 04:36

## 2020-09-11 RX ADMIN — GABAPENTIN 100 MG: 100 CAPSULE ORAL at 17:27

## 2020-09-11 ASSESSMENT — PAIN DESCRIPTION - PAIN TYPE
TYPE: CHRONIC PAIN

## 2020-09-11 NOTE — PROCEDURES
"WOUND CARE PROVIDER PROCEDURE NOTE            HPI: 64-year-old male homelessness, EtOH use, tobacco dependence, chronic left lower extremity wound admitted 8/7/2020 with left lower extremity wound infection.  Patient was recently hospitalized at [facility] in April 2020, evaluated by orthopedic surgery who recommended wound care.  Wound culture grew MSSA and strep.  Patient was recently seen at [facility] prior to this admission was given a prescription for antibiotics but did not fill this.  Patient reports that he has had this wound for 8 to 10 years.  He reports that he fell and went \"ass over tea kettle\".  He reports that he would clean the wound almost daily with soap and water at the homeless shelter.  Per chart review, patient reported he developed the ulcer in May 2018 when he fell while hiking.  Patient was seen at wound care clinic in August 2018.  Patient had a  assisting him while he was living at [facility] care housing.  Wound VAC /was ordered however  had concerns with patient's compliancy and/ access to medical care.  Skilled nursing facility was contacted to have patient be admitted, however he was not accepted due to Medicaid.    Patient is on Lovenox 40 mg daily.  Refused today however he took it yesterday.  Allergies reviewed.  He denies any allergies.      Interval hx: 9/11/2020: pt calm cooperative. On zyvox. Seen during VAC and two layer compression wrap change. Pt tolerating VAC and wraps. Wound decreased in size.     Results:  Wound cx: 9/1/2020 mrsa, diphtheroids, beta hemolytic strep group A    8/7/2020: X-ray tib-fib left:  1.  Healed distal metaphyseal fractures of the left fibula and tibia with tibial IM layal in place.     2.  No acute fracture or dislocation.     3.  No radiopaque soft tissue foreign body.      Arterial studies:   9/2/2020  Right.    Doppler waveforms of the common femoral artery are of high amplitude and    triphasic.    Doppler waveforms at the ankle " are brisk and triphasic.    Ankle-brachial index is normal.       Left.    Doppler waveforms of the common femoral artery are of high amplitude and    triphasic.    Doppler waveforms at the ankle are brisk and triphasic.    Ankle-brachial index is normal.    Unable to obtained popliteal waveforms due to wound bandages.        Exam:  Calm, listening to smooth jazz   Palpable PT pulse to left foot +1   +2 DP left foot  Difficulty palpating popliteal due to scar tissue  less scar tissue   rolled wound edges primarily to popliteal fossa  Majority tissue granular tissue, however scattered slough with senescent tissue present  No odor  No sig drainage, wound bed moist  No erythema   Able to extend left leg to 170 degrees. Able to flex left leg around 80 degrees            The procedure, rationale for doing so, and the possible risks- including infection, pain and bleeding- have been discussed with the patient.  The patient  verbalized understanding and is agreeable with proceeding.       DESCRIPTION OF PROCEDURE:  Excision of skin, subcutaneous tissue including chronic rolled wound edges to left lower leg ulcer    PMH, medications and recent labs reviewed    ANESTHESIA:  2 % viscous lidocaine, allowed to dwell for 5 min    PROCEDURE: A formal timeout was performed to confirm patient's correct name, correct site, correct procedure and correct laterality.      -Curette, forceps used to excise closed wound edges, scar tissue, fibrinous tissue, slough and senescent red tissue from the wound bed.  Tissue less friable.  Total area debrided, 297 cm².  Debridement carried into the subcutaneous tissue layer.   -Bleeding controlled with manual pressure.    Wound care completed by wound PT - refer to flowsheet and note  -Patient tolerated the procedure well, without significant c/o pain or discomfort.      Pre-debridement measurements: 32 x 9 x 0.2 cm  Post debridement measurements: 33 x 9 x 0.3 cm    Post debridement left lower leg  posterior post debride      R leg medial post debride                          ASSESSMENT/PLAN:  64-year-old male with homelessness, EtOH use, tobacco use, and chronic left leg ulcer.  S/P bedside excisional debridement performed today.  Medically necessary to promote wound healing.      -continue veraflo VAC with 2 layer compression wrap. veraflo VAC to be continued while pt is admitted. Does not need veraflo VAC at discharge.   -Continue abx  - Patient to continue to be seen by wound care team while admitted  Patient to continue to be followed by wound care nurse practitioner as he will require serial excisional debridements  -IP consult for foot nail care      Discharge plan:  Pending guardianship

## 2020-09-11 NOTE — CARE PLAN
Problem: Safety  Goal: Will remain free from falls  Outcome: PROGRESSING AS EXPECTED   Patient given safety education. Sitter present at bedside. Bed is locked and in the lowest position.     Problem: Psychosocial Needs:  Goal: Level of anxiety will decrease  Outcome: PROGRESSING AS EXPECTED  Patient is resting in bed. Patient given education on interventions to decrease anxiety.

## 2020-09-11 NOTE — PROGRESS NOTES
Patient was agitated and yelling and hitting himself in the head because his wound vac was beeping. I went to give the patient an IM injection of haldol per the MAR and this further increased his anxiety and agitation. Harrison the UC was able to calm him down. Sitter at bedside. Patient is no longer yelling and agitated, he is laying in bed.

## 2020-09-11 NOTE — THERAPY
"Speech Language Pathology  Daily Treatment     Patient Name: Bola Varela  Age:  64 y.o., Sex:  male  Medical Record #: [id number]  Today's Date: 9/10/2020     Precautions: (P) Fall Risk, Swallow Precautions ( See Comments)  Comments: poor short term memory and problem solving    Assessment    Patient was seen on this date for dysphagia therapy. Introduced myself but omitted type of service being provided due to causing increase in agitation last session (pt screamed and threw his hat across the room). Patient seen with a trial tray of easy-to-chew (EC7) and thin liquid (TN0) diet. Patient initially pleasant and conversing with SLP. Patient continues to talk with bolus in mouth and not receptive to verbal cueing. About 3-4 minutes into session patient inquiring why SLP was observing him eat and before explanation could be provided patient began yelling, \"I'm not a bug! I'm a human being!\" Was able to redirect patient by asking him if he wanted to listen to music which he declined. SLP stepped out of room to observe patient consume remainder of items from tray and PO trials of cheese and crackers. No overt s/sx of aspiration noted. Asked patient if he needed anything or was doing okay which revealed clear vocal quality. Mastication was functional for an easy to chew texture and much improved without being distracted with conversation.    Plan    Recommend upgrade to an Easy-to-Chew (EC7) and Thin Liquid (TN0) diet. Please limit distractions during meal time (i.e., conversing with patient). Suspect this is patient's baseline diet and cognitive function. Given poor participation in therapy will d/c all SLP services at this time. Patient may benefit from more structured outpatient speech therapy services to determine carryover of functional strategies.     Discharge secondary to no likely benefit.    Discharge Recommendations: Recommend home health for continued speech therapy services       Objective     09/10/20 " 6803   Vitals   O2 Delivery Device None - Room Air   Dysphagia    Positioning / Behavior Modification Modulate Rate or Bite Size;Self Monitoring   Other Treatments EC7/TN0 meal tray   Diet / Liquid Recommendation Regular - Easy to Chew (7);Thin (0)   Skilled Intervention Compensatory Strategies;Verbal Cueing   Recommended Route of Medication Administration   Medication Administration  Whole with Liquid Wash   Short Term Goals   Short Term Goal # 1 Pt will complete cognitive-linguistic evaluation to further characterize current functioning.   Goal Outcome # 1 Goal not met   Short Term Goal # 2 New 8/28: Patient will consume meals of SB6/TN0 with no s/sx of aspiration given monitoring during meals.   Goal Outcome # 2  Goal met   Short Term Goal # 2 B  Patient will consume meals of EC7/TN0 with no s/sx of aspiration given monitoring during meals.   Goal Outcome  # 2 B Goal met

## 2020-09-11 NOTE — CARE PLAN
Problem: Knowledge Deficit  Goal: Knowledge of disease process/condition, treatment plan, diagnostic tests, and medications will improve  Note: Reviewed POC including safety, mobility, pain management, medication administration, DVT prophylaxis, and discharge. No learning evidence at this time. Patient frustrated.     Problem: Discharge Barriers/Planning  Goal: Patient's continuum of care needs will be met  Outcome: PROGRESSING SLOWER THAN EXPECTED  Note: Patient incapacitated to make medical decisions. Pending guardianship.

## 2020-09-11 NOTE — WOUND TEAM
[facility]  Inpatient Services  Wound and Skin Care Progress Note    HPI, PMH, SH: Reviewed    Unit where seen by Wound Team: T309/00     WOUND CONSULT RELATED TO:  Left leg wound    Subjective:   Self Report / Pain Level:  No complaint of pain. Pre-medicated with PO oxy 5mg prior to dressing removal.     OBJECTIVE:  Patient sitting in bed with intact dressing. Pt able to move about room with supervision    WOUND TYPE, LOCATION, CHARACTERISTICS (Pressure Injuries: location, stage, POA or date identified)    Negative Pressure Wound Therapy 09/03/20 Leg Lower;Medial;Posterior Left (Active)   NPWT Pump Mode / Pressure Setting Ulta;Continuous;125 mmHg 09/11/20 1500   Dressing Type Black Foam (Regular) 09/11/20 1500   Number of Foam Pieces Used 1 09/09/20 1300   Canister Changed No 09/11/20 1500   Output (mL) 500 mL 09/10/20 1231   NEXT Dressing Change/Treatment Date 09/11/20 09/09/20 1300   VAC VeraFlo Irrigant Normal Saline 09/11/20 1500   VAC VeraFlo Soak Time (mins) 6 09/11/20 1500   VAC VeraFlo Instill Volume (ml) 32 09/11/20 1500   VAC VeraFlo - Therapy Time (hrs) 2 09/11/20 1500   VAC VeraFlo Pressure (mm/Hg) Continuous;125 mmHg 09/11/20 1500           Wound 08/07/20 Full Thickness Wound Leg LLE posterior, extends medially and laterally (Active)   Wound Image    09/11/20 1210   Site Assessment Red 09/11/20 1210   Periwound Assessment Edema;Scar tissue 09/11/20 1210   Margins Epibole (rolled edges) 09/11/20 1210   Closure Secondary intention 09/11/20 1210   Drainage Amount Small 09/11/20 1210   Drainage Description Serosanguineous 09/11/20 1210   Treatments Site care;Cleansed 09/11/20 1210   Wound Cleansing Approved Wound Cleanser 09/11/20 1210   Periwound Protectant Skin Protectant Wipes to Periwound;Paste Ring 09/11/20 1210   Dressing Cleansing/Solutions Normal Saline 09/11/20 1210   Dressing Options Wound Vac;Compression Wrap Two Layer 09/11/20 1210   Dressing Changed Changed 09/11/20 1210    Dressing Status Clean;Dry;Intact 09/11/20 0715   Dressing Change/Treatment Frequency Monday, Wednesday, Friday, and As Needed 09/11/20 1210   NEXT Dressing Change/Treatment Date 09/14/20 09/11/20 1210   NEXT Weekly Photo (Inpatient Only) 09/18/20 09/11/20 1210   Non-staged Wound Description Full thickness 09/09/20 1300   Wound Length (cm) 33 cm 09/11/20 1210   Wound Width (cm) 9 cm 09/11/20 1210   Wound Depth (cm) 0.3 cm 09/11/20 1210   Wound Surface Area (cm^2) 297 cm^2 09/11/20 1210   Wound Volume (cm^3) 89.1 cm^3 09/11/20 1210   Post-Procedure Length (cm) 36 cm 09/09/20 1300   Post-Procedure Width (cm) 16 cm 09/09/20 1300   Post-Procedure Depth (cm) 0.3 cm 09/09/20 1300   Post-Procedure Surface Area (cm^2) 576 cm^2 09/09/20 1300   Post-Procedure Volume (cm^3) 172.8 cm^3 09/09/20 1300   Wound Healing % -69 09/11/20 1210   Wound Bed Granulation (%) 100 % 09/11/20 1210   Wound Bed Epithelium (%) 0 % 09/01/20 1300   Wound Bed Slough (%) 50 % 09/09/20 1300   Wound Bed Granulation (%) - Post-Procedure 0 % 09/01/20 1300   Wound Bed Epithelium % - (Post-Procedure) 0 % 09/01/20 1300   Wound Bed Slough % - (Post-Procedure) 0 % 09/01/20 1300   Wound Bed Eschar % - (Post-Procedure) 0 % 09/01/20 1300   Tunneling (cm) 0 cm 09/03/20 1300   Undermining (cm) 0 cm 09/03/20 1300   Shape wishbone 09/11/20 1210   Wound Odor None 09/11/20 1210   Pulses Left;2+;DP;PT 09/09/20 1300   Exposed Structures None 09/11/20 1210   WOUND NURSE ONLY - Time Spent with Patient (mins) 75 09/11/20 1210                   Vascular:    Dorsal Pedal pulses: NT  Posterior tib pulses:  NT    KOSTAS:   Performed 9/2/20 Normal bilateral KOSTAS's. R = 1.18; Left = 1.09       Lab Values:    Lab Results   Component Value Date/Time    WBC 6.5 09/03/2020 11:31 AM    RBC 3.82 (L) 09/03/2020 11:31 AM    HEMOGLOBIN 11.2 (L) 09/03/2020 11:31 AM    HEMATOCRIT 35.0 (L) 09/03/2020 11:31 AM       Lab Results   Component Value Date/Time    HBA1C 5.7 (H) 11/09/2018 06:30 PM              Culture:   Obtained 9/2/2020; (+) MRSA, beta hemolytic strep A, diphteroids.     INTERVENTIONS BY WOUND TEAM:  Dressing removed, wound cleansed with wound cleanser, Asaf MORENO debrided wound and edges, Wound cleansed again, skin prep and paste ring addison wound, VF foam onto wound bed and obtained NPWT at 125 mmHg, VF settings per LDA.  Tubi E on thigh with hole for tubing, tubing up through pants, 2 layer compression wrap on leg creating an overlap between tubi and 2 layer.    Interdisciplinary consultation: Patient, Asaf MORENO    EVALUATION: wound bed 100% red granular tissue. Edges are thick and will slow down healing.  Asaf MORENO performing serial debridements to improve edges.    Goals: Steady decrease in wound area and depth weekly.     NURSING PLAN OF CARE ORDERS (X):  Dressing changes: See Dressing Care orders: X  Skin care: See Skin Care orders: NA  Rectal tube care: See Rectal Tube Care orders:        Other orders:                             WOUND TEAM PLAN OF CARE (X):   Dressing changes by wound team:          Follow up 1-2 times weekly:            Follow up 3 times weekly:                NPWT change 3 times weekly: X    Follow up as needed:       Other (explain):     Anticipated discharge plans (X): Pending guardianship   LTACH:        SNF/Rehab:  Pt will need ongoing woundcare for left leg wound upon discharge - vac changes 3x/week                Home Care:           Outpatient Wound Center:            Self Care:            Other:

## 2020-09-12 PROBLEM — A41.9 SEPSIS (HCC): Status: RESOLVED | Noted: 2018-11-09 | Resolved: 2020-09-12

## 2020-09-12 PROBLEM — E83.42 HYPOMAGNESEMIA: Status: RESOLVED | Noted: 2018-08-24 | Resolved: 2020-09-12

## 2020-09-12 PROBLEM — E87.6 HYPOKALEMIA: Status: RESOLVED | Noted: 2018-08-23 | Resolved: 2020-09-12

## 2020-09-12 PROCEDURE — 770021 HCHG ROOM/CARE - ISO PRIVATE

## 2020-09-12 PROCEDURE — 700102 HCHG RX REV CODE 250 W/ 637 OVERRIDE(OP): Performed by: NURSE PRACTITIONER

## 2020-09-12 PROCEDURE — A9270 NON-COVERED ITEM OR SERVICE: HCPCS | Performed by: NURSE PRACTITIONER

## 2020-09-12 RX ADMIN — OXYCODONE HYDROCHLORIDE 5 MG: 5 TABLET ORAL at 04:29

## 2020-09-12 RX ADMIN — RISPERIDONE 1 MG: 1 TABLET ORAL at 17:30

## 2020-09-12 RX ADMIN — OXYCODONE HYDROCHLORIDE 5 MG: 5 TABLET ORAL at 17:30

## 2020-09-12 RX ADMIN — ACETAMINOPHEN 650 MG: 325 TABLET, FILM COATED ORAL at 20:33

## 2020-09-12 RX ADMIN — GABAPENTIN 100 MG: 100 CAPSULE ORAL at 17:30

## 2020-09-12 RX ADMIN — GABAPENTIN 100 MG: 100 CAPSULE ORAL at 04:29

## 2020-09-12 RX ADMIN — DIVALPROEX SODIUM 125 MG: 125 CAPSULE ORAL at 04:29

## 2020-09-12 RX ADMIN — OXYCODONE HYDROCHLORIDE 5 MG: 5 TABLET ORAL at 20:31

## 2020-09-12 RX ADMIN — OXYCODONE HYDROCHLORIDE 5 MG: 5 TABLET ORAL at 12:40

## 2020-09-12 RX ADMIN — LINEZOLID 600 MG: 600 TABLET, FILM COATED ORAL at 17:30

## 2020-09-12 RX ADMIN — DIVALPROEX SODIUM 125 MG: 125 CAPSULE ORAL at 12:40

## 2020-09-12 RX ADMIN — RISPERIDONE 0.5 MG: 0.5 TABLET ORAL at 04:29

## 2020-09-12 RX ADMIN — LINEZOLID 600 MG: 600 TABLET, FILM COATED ORAL at 04:29

## 2020-09-12 RX ADMIN — AMLODIPINE BESYLATE 5 MG: 5 TABLET ORAL at 04:29

## 2020-09-12 RX ADMIN — GABAPENTIN 100 MG: 100 CAPSULE ORAL at 12:40

## 2020-09-12 RX ADMIN — DIVALPROEX SODIUM 125 MG: 125 CAPSULE ORAL at 20:31

## 2020-09-12 RX ADMIN — Medication 400 MG: at 04:29

## 2020-09-12 RX ADMIN — Medication 400 MG: at 17:30

## 2020-09-12 ASSESSMENT — PAIN DESCRIPTION - PAIN TYPE
TYPE: CHRONIC PAIN
TYPE: ACUTE PAIN;CHRONIC PAIN
TYPE: CHRONIC PAIN
TYPE: ACUTE PAIN;CHRONIC PAIN

## 2020-09-12 ASSESSMENT — FIBROSIS 4 INDEX: FIB4 SCORE: 0.68

## 2020-09-12 NOTE — PROGRESS NOTES
0710 Patient's in bed. Bedside report reiceived from RISA Sousa RN's at the beginning of the shift. Isolation precaution observed.     0905 Patietn's sitting up in bed. Rates LLE pain 3/10, declines pain medication at thist manpreet. Wound vac patent to LLE. Fall Protocol in effect. Call light within reach. Reminded patient to call for assist. Assessment completed. No distress noted. Plan of care reviewed with the patient. Confused, re oriented. Sitter. Isolation precaution observed.     1155 Patients sitting up in bed, having lunch. No distress noted.    1240 Medicated with Oxycdone (see MAR) for c/o's left leg pain, rates pain 8/10.    1300 Patient's in bed. No distress noted.    1530 Patient ambulates in his room, noted with steady gait. No distress noted.    1730 Patient's sitting up in bed, having Dinner. Medicated with Oxycodone (see MAR) for c/o's LLE pain, rates pain 8/10.    1855  Patient's in bed. No changes in status. Bedside report given to Oncoming NOC RN (Patricia).

## 2020-09-12 NOTE — CARE PLAN
Problem: Safety  Goal: Will remain free from falls  Outcome: PROGRESSING AS EXPECTED   Call light within reach. Safety sitter at bedside.     Problem: Psychosocial Needs:  Goal: Level of anxiety will decrease  Outcome: PROGRESSING SLOWER THAN EXPECTED  Patient continues outbursts of agitation. Patient is easily redirectable.

## 2020-09-12 NOTE — PROGRESS NOTES
[facility] Medicine  Weekly Progress Note    Date of Service  9/12/2020    Chief Complaint  LLE wound    Hospital Course   Mr. Varela is a 64-year-old male with PMH significant for homelessness, etoh use, tobacco dependence and chronic LLE wound who presented 8/7/2020 with LLE wound infection.  He was hospitalized at our facility in April and evaluated by orthopedic surgery who recommended wound care.  Wound cultures at that time grew MSSA and strep.  Patient reported losing his Medicaid card and having no transportation to wound clinic.  He was seen at [facility] earlier this week and prescribed ABX, however he has not filled the prescription.  US LLE negative for DVT.  Treated for sepsis with empiric ABX and wound care.  He was found to have head lice and underwent treatments. Continued to have intermittent agitation so was started on risperdol, depakote and gabapentin per psych recs.  He has been deemed incapacitated to make medical decisions and is currently pending guardianship and placement.  He is medically stable and will be only be evaluated 2 times weekly unless there is a clinical change.          Interval Problem Update  Patient is ambulating in the room, pleasant and cooperative.  Zyvox thru 9/16. LPS following for serial debridements. VSS. Tolerating meds.    Last CM note: received phycians assessment for guardianship paperwork.  All paperwork faxed to Teri Madrigal at [phone].    Consultants/Specialty  Psychiatry    Code Status  Full Code    Disposition  Homeless. 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   -blood cultures (-) to date  -afebrile. Leukocytosis resolved  -LPS following for serial debridements; no need for Vac at dc  -Cx 9/1 Strep, MRSA, diptheroids: started Zyvox 9/2 thru  9/16    Encephalopathy- (present on admission)  Assessment & Plan  -acute on chronic, likely due Wernicke's   -Psychiatry: incapacitated to make medical decision or leave AMA   -Requires guardianship and placement  -Avoid narcotics and hypnotics.  Cautious use of benzodiazepines for alcohol withdrawal protocol  -Frequent reorientation  -Sleep hygiene    Non-compliance  Assessment & Plan  -likely also component of psychiatric disorder and ETOH abuse  -Psychiatric consulted and suggests patient is incapacitated to make medical decisions or leave AMA  -ongoing encouragement for compliance.    Psychiatric disorder  Assessment & Plan  -unknown/unspecified  -psychiatry consulted for agitation - incapacitated to leave AMA or make medical decisions, appreciate recs: risperdol 0.5 -1mg BID, cont depakote 125mg TID (8/15/20, watch LFTs), added neurotin 100mg TID 8/16  -Pending guardianship. Application to be submitted 9/3.    Flexion contracture of knee, 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)  Assessment & Plan  -secondary to chronic wound in that area  -PT OT  -encourage mobility    Emphysema/COPD (HCC)  Assessment & Plan  Stable. RA. Chronic w/o exacerbation    Head lice  Assessment & Plan  Head lice found  S/p first round of treatment 8/10, treated again on 8/13 given active lice seen by CNA, will need second treatment 8/20. Given 3rd treatment.  If still having issues will likely need to shave patient    Alcohol dependence (HCC)- (present on admission)  Assessment & Plan  -history of. Cont MV    Protein malnutrition (HCC)  Assessment & Plan  -history of ETOH and homelessness  -dietary consult  -TID supplements  -encourage PO intake    Tobacco dependence- (present on admission)  Assessment & Plan  -Nicotine replacement protocol       VTE prophylaxis: Enoxaparin    Demarcus Mccabe, A.P.N.    I certify that the patient requires continued medically necessary hospital services for the treatment of psychosis with neurocognitive disorder.  The patient will remain in the hospital for the foreseeable future.  Discharge may or may not occur in the next 20 days due to ongoing discharge delays due to no medically acceptable discharge option.

## 2020-09-12 NOTE — CARE PLAN
Problem: Safety  Goal: Will remain free from injury  Outcome: PROGRESSING AS EXPECTED  Note: Safety precaution in effect. Non skid socks in use. Call light within reach. Reminded patient to call for assist. Hourly rounds in effect. Non skid socks in use. Call light within reach. Reminded patient to call for assist. Safety sitter at the bedside.     Problem: Infection  Goal: Will remain free from infection  Outcome: PROGRESSING AS EXPECTED  Note: Implement Standard precaution. Hand washing every encounter & before and after patient care. Isolation precaution observed.     Problem: Knowledge Deficit  Goal: Knowledge of disease process/condition, treatment plan, diagnostic tests, and medications will improve  Outcome: PROGRESSING AS EXPECTED  Note: Discussed Plan of care. Questions answered.     Problem: Pain Management  Goal: Pain level will decrease to patient's comfort goal  Outcome: PROGRESSING AS EXPECTED  Note: Educated on pain scale. Encouraged to verbalize pain. Will medicate as per MAR.    Patient's is confused, needs re orientation.

## 2020-09-13 PROCEDURE — A9270 NON-COVERED ITEM OR SERVICE: HCPCS | Performed by: NURSE PRACTITIONER

## 2020-09-13 PROCEDURE — 700102 HCHG RX REV CODE 250 W/ 637 OVERRIDE(OP): Performed by: NURSE PRACTITIONER

## 2020-09-13 PROCEDURE — 770021 HCHG ROOM/CARE - ISO PRIVATE

## 2020-09-13 RX ADMIN — GABAPENTIN 100 MG: 100 CAPSULE ORAL at 06:00

## 2020-09-13 RX ADMIN — RISPERIDONE 0.5 MG: 0.5 TABLET ORAL at 06:00

## 2020-09-13 RX ADMIN — OXYCODONE HYDROCHLORIDE 5 MG: 5 TABLET ORAL at 03:01

## 2020-09-13 RX ADMIN — DIVALPROEX SODIUM 125 MG: 125 CAPSULE ORAL at 23:27

## 2020-09-13 RX ADMIN — LINEZOLID 600 MG: 600 TABLET, FILM COATED ORAL at 06:00

## 2020-09-13 RX ADMIN — OXYCODONE HYDROCHLORIDE 5 MG: 5 TABLET ORAL at 17:57

## 2020-09-13 RX ADMIN — Medication 400 MG: at 06:01

## 2020-09-13 RX ADMIN — RISPERIDONE 1 MG: 1 TABLET ORAL at 17:12

## 2020-09-13 RX ADMIN — LINEZOLID 600 MG: 600 TABLET, FILM COATED ORAL at 17:12

## 2020-09-13 RX ADMIN — OXYCODONE HYDROCHLORIDE 5 MG: 5 TABLET ORAL at 14:54

## 2020-09-13 RX ADMIN — AMLODIPINE BESYLATE 5 MG: 5 TABLET ORAL at 06:00

## 2020-09-13 RX ADMIN — OXYCODONE HYDROCHLORIDE 5 MG: 5 TABLET ORAL at 10:08

## 2020-09-13 RX ADMIN — DIVALPROEX SODIUM 125 MG: 125 CAPSULE ORAL at 13:05

## 2020-09-13 RX ADMIN — Medication 400 MG: at 17:12

## 2020-09-13 RX ADMIN — DIVALPROEX SODIUM 125 MG: 125 CAPSULE ORAL at 06:00

## 2020-09-13 RX ADMIN — GABAPENTIN 100 MG: 100 CAPSULE ORAL at 13:05

## 2020-09-13 RX ADMIN — OXYCODONE HYDROCHLORIDE 5 MG: 5 TABLET ORAL at 23:27

## 2020-09-13 RX ADMIN — GABAPENTIN 100 MG: 100 CAPSULE ORAL at 17:12

## 2020-09-13 RX ADMIN — OXYCODONE HYDROCHLORIDE 5 MG: 5 TABLET ORAL at 06:00

## 2020-09-13 ASSESSMENT — PATIENT HEALTH QUESTIONNAIRE - PHQ9
SUM OF ALL RESPONSES TO PHQ9 QUESTIONS 1 AND 2: 0
2. FEELING DOWN, DEPRESSED, IRRITABLE, OR HOPELESS: NOT AT ALL
1. LITTLE INTEREST OR PLEASURE IN DOING THINGS: NOT AT ALL

## 2020-09-13 ASSESSMENT — PAIN DESCRIPTION - PAIN TYPE
TYPE: ACUTE PAIN

## 2020-09-13 NOTE — PROGRESS NOTES
Report from day RN, POC discussed, patient in the bathroom with wound vac in place, safety sitter at bedside.

## 2020-09-13 NOTE — CARE PLAN
Problem: Pain Management  Goal: Pain level will decrease to patient's comfort goal  Outcome: PROGRESSING AS EXPECTED  Intervention: Educate and implement non-pharmacologic comfort measures. Examples: relaxation, distration, play therapy, activity therapy, massage, etc.  Flowsheets (Taken 9/13/2020 0247)  Intervention:   Relaxation Technique   Medication (see MAR)  Note: Pain assessment q 2 hours, medicated as needed, comfort measures provided.

## 2020-09-13 NOTE — PROGRESS NOTES
0705 Patient's in bed. Bedside report reiceived from RISA Gomez RN at the beginning of the shift. Isolation precaution observed.      0740  Patient's sitting up in bed. Rates LLE pain 3 /10, declines pain medication at this time. Wound vac patent to LLE. Fall Protocol in effect. Call light within reach. Reminded patient to call for assist. Assessment completed. No distress noted. Plan of care reviewed with the patient. Confused, re oriented. Safety sitter at the bedside. Isolation precaution observed.     0910 Patient ambulates in his room, noted with steady gait. No distress noted.    1008 Medicated with Oxycodone (see MAR) for c/o's LLE pain, rates pain 8/10.    1140 Patient's sitting up in bed, having lunch. No distress noted.    1300 Patient's in bed. No distress noted.    1454 Medicated with Oxycodone (see MAR) for c/o's LLE pain, rates pan 8/10.    1715 Patient's sitting up in bed, having Dinner. No distress noted.    1757 Medicated with Oxycodone (see MAR) for c/o's LLE, rates pain 8/10.    1900  Patient's in bed. No changes in status.  Bedside reprt given to Vanessa LORD RN (Kayla).

## 2020-09-14 PROCEDURE — 700102 HCHG RX REV CODE 250 W/ 637 OVERRIDE(OP): Performed by: NURSE PRACTITIONER

## 2020-09-14 PROCEDURE — 302098 PASTE RING (FLAT): Performed by: NURSE PRACTITIONER

## 2020-09-14 PROCEDURE — A9270 NON-COVERED ITEM OR SERVICE: HCPCS | Performed by: NURSE PRACTITIONER

## 2020-09-14 PROCEDURE — 770021 HCHG ROOM/CARE - ISO PRIVATE

## 2020-09-14 PROCEDURE — 97606 NEG PRS WND THER DME>50 SQCM: CPT

## 2020-09-14 RX ADMIN — LINEZOLID 600 MG: 600 TABLET, FILM COATED ORAL at 04:57

## 2020-09-14 RX ADMIN — DIVALPROEX SODIUM 125 MG: 125 CAPSULE ORAL at 21:57

## 2020-09-14 RX ADMIN — OXYCODONE HYDROCHLORIDE 5 MG: 5 TABLET ORAL at 17:57

## 2020-09-14 RX ADMIN — GABAPENTIN 100 MG: 100 CAPSULE ORAL at 11:45

## 2020-09-14 RX ADMIN — DIVALPROEX SODIUM 125 MG: 125 CAPSULE ORAL at 04:57

## 2020-09-14 RX ADMIN — OXYCODONE HYDROCHLORIDE 5 MG: 5 TABLET ORAL at 11:45

## 2020-09-14 RX ADMIN — RISPERIDONE 0.5 MG: 0.5 TABLET ORAL at 04:57

## 2020-09-14 RX ADMIN — Medication 400 MG: at 04:57

## 2020-09-14 RX ADMIN — OXYCODONE HYDROCHLORIDE 5 MG: 5 TABLET ORAL at 14:44

## 2020-09-14 RX ADMIN — OXYCODONE HYDROCHLORIDE 5 MG: 5 TABLET ORAL at 21:57

## 2020-09-14 RX ADMIN — AMLODIPINE BESYLATE 5 MG: 5 TABLET ORAL at 05:10

## 2020-09-14 RX ADMIN — GABAPENTIN 100 MG: 100 CAPSULE ORAL at 17:57

## 2020-09-14 RX ADMIN — DIVALPROEX SODIUM 125 MG: 125 CAPSULE ORAL at 14:04

## 2020-09-14 RX ADMIN — Medication 400 MG: at 17:56

## 2020-09-14 RX ADMIN — LINEZOLID 600 MG: 600 TABLET, FILM COATED ORAL at 17:57

## 2020-09-14 RX ADMIN — OXYCODONE HYDROCHLORIDE 5 MG: 5 TABLET ORAL at 08:42

## 2020-09-14 RX ADMIN — GABAPENTIN 100 MG: 100 CAPSULE ORAL at 04:57

## 2020-09-14 RX ADMIN — OXYCODONE HYDROCHLORIDE 5 MG: 5 TABLET ORAL at 04:56

## 2020-09-14 RX ADMIN — RISPERIDONE 1 MG: 1 TABLET ORAL at 17:57

## 2020-09-14 ASSESSMENT — PAIN DESCRIPTION - PAIN TYPE
TYPE: ACUTE PAIN

## 2020-09-14 NOTE — CARE PLAN
Problem: Safety  Goal: Will remain free from falls  Outcome: PROGRESSING AS EXPECTED     Problem: Bowel/Gastric:  Goal: Normal bowel function is maintained or improved  Outcome: PROGRESSING AS EXPECTED  Goal: Will not experience complications related to bowel motility  Outcome: PROGRESSING AS EXPECTED     Problem: Knowledge Deficit  Goal: Knowledge of disease process/condition, treatment plan, diagnostic tests, and medications will improve  Outcome: PROGRESSING AS EXPECTED  Goal: Knowledge of the prescribed therapeutic regimen will improve  Outcome: PROGRESSING AS EXPECTED     Problem: Fluid Volume:  Goal: Will maintain balanced intake and output  Outcome: PROGRESSING AS EXPECTED     Problem: Pain Management  Goal: Pain level will decrease to patient's comfort goal  Outcome: PROGRESSING AS EXPECTED     Problem: Psychosocial Needs:  Goal: Level of anxiety will decrease  Outcome: PROGRESSING AS EXPECTED     Problem: Mobility  Goal: Risk for activity intolerance will decrease  Outcome: PROGRESSING AS EXPECTED

## 2020-09-14 NOTE — PROGRESS NOTES
"Bedside report received.  Assessment complete.  A&O x person. Patient does not call appropriately.  Patient ambulates with no assist. Safety sitter at bedside.   Patient has 7/10 pain. Pain managed with prescribed medications.  Denies N&V. Tolerating regular diet.  Surgical dressing and wound vac CDI.  + void, + flatus, + BM.  Patient denies SOB.  SCD's refused.  Patient is calm and cooperative.  Review plan with of care with patient. Call light and personal belongings with in reach. Hourly rounding in place. All needs met at this time.  /82   Pulse 74   Temp 36.9 °C (98.4 °F) (Temporal)   Resp 18   Ht 1.803 m (5' 10.98\")   Wt 67.3 kg (148 lb 5.9 oz)   SpO2 91%   BMI 20.70 kg/m²     "

## 2020-09-14 NOTE — WOUND TEAM
[facility]  Inpatient Services  Wound and Skin Care Progress Note    HPI, PMH, SH: Reviewed    Unit where seen by Wound Team: T309/00     WOUND CONSULT RELATED TO:  Left leg wound NPWT change    Subjective:   Self Report / Pain Level:  No complaint of pain. Pre-medicated with PO oxy 5mg prior to dressing removal.     OBJECTIVE:  Patient sitting in bed with intact dressing. Pt able to move about room with supervision    WOUND TYPE, LOCATION, CHARACTERISTICS (Pressure Injuries: location, stage, POA or date identified)    Negative Pressure Wound Therapy 09/03/20 Leg Lower;Medial;Posterior Left (Active)   NPWT Pump Mode / Pressure Setting Ulta;Continuous;125 mmHg 09/14/20 1100   Dressing Type Medium;Black Foam (Veraflo) 09/14/20 1100   Number of Foam Pieces Used 1 09/14/20 1100   NEXT Dressing Change/Treatment Date 09/16/20 09/14/20 1100   VAC VeraFlo Irrigant Normal Saline 09/14/20 1100   VAC VeraFlo Soak Time (mins) 6 09/14/20 1100   VAC VeraFlo Instill Volume (ml) 32 09/14/20 1100   VAC VeraFlo - Therapy Time (hrs) 2 09/14/20 1100   VAC VeraFlo Pressure (mm/Hg) Continuous;125 mmHg 09/14/20 1100         Wound 08/07/20 Full Thickness Wound Leg LLE posterior, extends medially and laterally (Active)   Wound Image    09/11/20 1210   Site Assessment Red;Fully granulated 09/14/20 1100   Periwound Assessment Clean;Dry;Intact;Scar tissue 09/14/20 1100   Margins Attached edges;Defined edges 09/14/20 1100   Closure Secondary intention 09/14/20 1100   Drainage Amount Small 09/14/20 1100   Drainage Description Serosanguineous 09/14/20 1100   Treatments Cleansed;CSWD - Conservative Sharp Wound Debridement;Irrigation;Site care;Compression 09/14/20 1100   Wound Cleansing Approved Wound Cleanser 09/14/20 1100   Periwound Protectant Benzoin;Paste Ring;Drape 09/14/20 1100   Dressing Cleansing/Solutions Normal Saline 09/14/20 1100   Dressing Options Wound Vac 09/14/20 1100   Dressing Changed Changed 09/14/20 1100    Dressing Status Clean;Dry;Intact 09/14/20 1100   Dressing Change/Treatment Frequency By Wound Team Only 09/14/20 1100   NEXT Dressing Change/Treatment Date 09/16/20 09/14/20 1100   NEXT Weekly Photo (Inpatient Only) 09/18/20 09/12/20 2031   Non-staged Wound Description Full thickness 09/14/20 1100   Wound Bed Granulation (%) 100 % 09/11/20 1210   Wound Bed Epithelium (%) 0 % 09/01/20 1300   Wound Bed Slough (%) 50 % 09/09/20 1300   Wound Bed Granulation (%) - Post-Procedure 0 % 09/01/20 1300   Wound Bed Epithelium % - (Post-Procedure) 0 % 09/01/20 1300   Wound Bed Slough % - (Post-Procedure) 0 % 09/01/20 1300   Wound Bed Eschar % - (Post-Procedure) 0 % 09/01/20 1300   Tunneling (cm) 0 cm 09/03/20 1300   Undermining (cm) 0 cm 09/03/20 1300   Shape wishbone 09/11/20 1210   Wound Odor Mild 09/14/20 1100   Pulses Left;2+;DP;PT 09/14/20 1100   Exposed Structures None 09/14/20 1100   WOUND NURSE ONLY - Time Spent with Patient (mins) 75 09/11/20 1210       Vascular:    Dorsal Pedal pulses: NT  Posterior tib pulses:  NT    KOSTAS:   Performed 9/2/20 Normal bilateral KOSTAS's. R = 1.18; Left = 1.09       Lab Values:    Lab Results   Component Value Date/Time    WBC 6.5 09/03/2020 11:31 AM    RBC 3.82 (L) 09/03/2020 11:31 AM    HEMOGLOBIN 11.2 (L) 09/03/2020 11:31 AM    HEMATOCRIT 35.0 (L) 09/03/2020 11:31 AM       Lab Results   Component Value Date/Time    HBA1C 5.7 (H) 11/09/2018 06:30 PM             Culture:   Obtained 9/2/2020; (+) MRSA, beta hemolytic strep A, diphteroids.     INTERVENTIONS BY WOUND TEAM:  Had patient lay on his stomach with his pants off for dressing removal.  2 layer compression wrap and wound vac dressing removed.  CSWD with curette to left LE wound ~200cm2 of removal of slough from wound bed.  Cleansed with wound cleanser and patted dry with gauze.  Meredith-wound protected with benzoin and paste ring prior to 1 black foam piece into the wound bed secured with drape.  Tubi E on thigh with hole for tubing,  tubing up through pants, 2 layer compression wrap on leg creating an overlap between tubi and 2 layer.      Interdisciplinary consultation: Patient, and bedside RN (Cong)    EVALUATION: wound bed 100% red granular tissue. Edges are thick and will slow down healing.  Asaf MORENO performing serial debridements to improve edges.      Goals: Steady decrease in wound area and depth weekly.     NURSING PLAN OF CARE ORDERS (X):  Dressing changes: See Dressing Care orders: X  Skin care: See Skin Care orders: NA  Rectal tube care: See Rectal Tube Care orders:        Other orders:                             WOUND TEAM PLAN OF CARE (X):   Dressing changes by wound team:          Follow up 1-2 times weekly:            Follow up 3 times weekly:                NPWT change 3 times weekly: X    Follow up as needed:       Other (explain):     Anticipated discharge plans (X): Pending guardianship   LTACH:        SNF/Rehab:  Pt will need ongoing woundcare for left leg wound upon discharge - vac changes 3x/week                Home Care:           Outpatient Wound Center:            Self Care:            Other:

## 2020-09-15 PROCEDURE — 700102 HCHG RX REV CODE 250 W/ 637 OVERRIDE(OP): Performed by: NURSE PRACTITIONER

## 2020-09-15 PROCEDURE — A9270 NON-COVERED ITEM OR SERVICE: HCPCS | Performed by: NURSE PRACTITIONER

## 2020-09-15 PROCEDURE — 770021 HCHG ROOM/CARE - ISO PRIVATE

## 2020-09-15 PROCEDURE — 700111 HCHG RX REV CODE 636 W/ 250 OVERRIDE (IP): Performed by: NURSE PRACTITIONER

## 2020-09-15 RX ORDER — SODIUM CHLORIDE 9 MG/ML
INJECTION, SOLUTION INTRAVENOUS
Status: COMPLETED
Start: 2020-09-15 | End: 2020-09-15

## 2020-09-15 RX ADMIN — Medication 400 MG: at 17:53

## 2020-09-15 RX ADMIN — OXYCODONE HYDROCHLORIDE 5 MG: 5 TABLET ORAL at 08:51

## 2020-09-15 RX ADMIN — GABAPENTIN 100 MG: 100 CAPSULE ORAL at 11:53

## 2020-09-15 RX ADMIN — DIVALPROEX SODIUM 125 MG: 125 CAPSULE ORAL at 13:52

## 2020-09-15 RX ADMIN — OXYCODONE HYDROCHLORIDE 5 MG: 5 TABLET ORAL at 14:52

## 2020-09-15 RX ADMIN — DIVALPROEX SODIUM 125 MG: 125 CAPSULE ORAL at 21:16

## 2020-09-15 RX ADMIN — OXYCODONE HYDROCHLORIDE 5 MG: 5 TABLET ORAL at 21:16

## 2020-09-15 RX ADMIN — LINEZOLID 600 MG: 600 TABLET, FILM COATED ORAL at 17:53

## 2020-09-15 RX ADMIN — ACETAMINOPHEN 650 MG: 325 TABLET, FILM COATED ORAL at 04:14

## 2020-09-15 RX ADMIN — Medication 400 MG: at 04:14

## 2020-09-15 RX ADMIN — RISPERIDONE 0.5 MG: 0.5 TABLET ORAL at 04:14

## 2020-09-15 RX ADMIN — OXYCODONE HYDROCHLORIDE 5 MG: 5 TABLET ORAL at 17:53

## 2020-09-15 RX ADMIN — RISPERIDONE 1 MG: 1 TABLET ORAL at 17:53

## 2020-09-15 RX ADMIN — LINEZOLID 600 MG: 600 TABLET, FILM COATED ORAL at 04:14

## 2020-09-15 RX ADMIN — GABAPENTIN 100 MG: 100 CAPSULE ORAL at 17:53

## 2020-09-15 RX ADMIN — OXYCODONE HYDROCHLORIDE 5 MG: 5 TABLET ORAL at 11:49

## 2020-09-15 RX ADMIN — AMLODIPINE BESYLATE 5 MG: 5 TABLET ORAL at 04:14

## 2020-09-15 RX ADMIN — GABAPENTIN 100 MG: 100 CAPSULE ORAL at 04:14

## 2020-09-15 RX ADMIN — DIVALPROEX SODIUM 125 MG: 125 CAPSULE ORAL at 04:14

## 2020-09-15 ASSESSMENT — PAIN DESCRIPTION - PAIN TYPE
TYPE: ACUTE PAIN
TYPE: ACUTE PAIN

## 2020-09-15 NOTE — PROGRESS NOTES
I certify that the patient requires continued medically necessary hospital services for the treatment of cognitive impairment requiring 24/7 supervision/placement. The patient will remain in the hospital for the foreseeable future.  Discharge may or may not occur in the next 20 days due to ongoing discharge delays due to no medically acceptable discharge option.

## 2020-09-15 NOTE — PROGRESS NOTES
"Bedside report received.  Assessment complete.  A&O x 4. Patient calls appropriately.  Patient ambulates with no assist. Bed alarm off. Safety sitter at bedside.   Patient has 7/10 pain. Pain managed with prescribed medications.  Denies N&V. Tolerating regular diet.  Surgical dressing and wound vac CDI.  + void, + flatus, + BM.  Patient denies SOB.  SCD's refused.  Patient is calm and cooperative.  Review plan with of care with patient. Call light and personal belongings with in reach. Hourly rounding in place. All needs met at this time.  /61   Pulse 71   Temp 36.9 °C (98.4 °F) (Temporal)   Resp 14   Ht 1.803 m (5' 10.98\")   Wt 67.3 kg (148 lb 5.9 oz)   SpO2 95%   BMI 20.70 kg/m²     "

## 2020-09-15 NOTE — CARE PLAN
Problem: Safety  Goal: Will remain free from falls  Outcome: PROGRESSING AS EXPECTED     Problem: Fluid Volume:  Goal: Will maintain balanced intake and output  Outcome: PROGRESSING AS EXPECTED     Problem: Pain Management  Goal: Pain level will decrease to patient's comfort goal  Outcome: PROGRESSING AS EXPECTED     Problem: Psychosocial Needs:  Goal: Level of anxiety will decrease  Outcome: PROGRESSING AS EXPECTED     Problem: Mobility  Goal: Risk for activity intolerance will decrease  Outcome: PROGRESSING AS EXPECTED     Problem: Respiratory:  Goal: Respiratory status will improve  Outcome: PROGRESSING AS EXPECTED

## 2020-09-15 NOTE — CARE PLAN
Problem: Safety  Goal: Will remain free from falls  Outcome: PROGRESSING AS EXPECTED  Note: Safety sitter in use. No unsafe attempts at mobilization noted during rounds.      Problem: Knowledge Deficit  Goal: Knowledge of disease process/condition, treatment plan, diagnostic tests, and medications will improve  Outcome: NOT MET  Goal: Knowledge of the prescribed therapeutic regimen will improve  Outcome: NOT MET  Intervention: Discuss information regarding therpeutic regimen and document in education  Note: Pt was educated on POC, MAR, shift routine and nursing education R/T Dx. Pt only oriented to self. Pt does not appear to retain education.

## 2020-09-15 NOTE — PROGRESS NOTES
Report received from Charge RNRaul. Assumed care of pt. Pt currently sleeping with unlabored breathing. Safety sitter in place.

## 2020-09-16 PROCEDURE — 302098 PASTE RING (FLAT): Performed by: NURSE PRACTITIONER

## 2020-09-16 PROCEDURE — 700102 HCHG RX REV CODE 250 W/ 637 OVERRIDE(OP): Performed by: NURSE PRACTITIONER

## 2020-09-16 PROCEDURE — A9270 NON-COVERED ITEM OR SERVICE: HCPCS | Performed by: NURSE PRACTITIONER

## 2020-09-16 PROCEDURE — 770021 HCHG ROOM/CARE - ISO PRIVATE

## 2020-09-16 PROCEDURE — 700111 HCHG RX REV CODE 636 W/ 250 OVERRIDE (IP): Performed by: NURSE PRACTITIONER

## 2020-09-16 PROCEDURE — 97606 NEG PRS WND THER DME>50 SQCM: CPT

## 2020-09-16 RX ADMIN — OXYCODONE HYDROCHLORIDE 5 MG: 5 TABLET ORAL at 05:03

## 2020-09-16 RX ADMIN — OXYCODONE HYDROCHLORIDE 5 MG: 5 TABLET ORAL at 02:01

## 2020-09-16 RX ADMIN — OXYCODONE HYDROCHLORIDE 5 MG: 5 TABLET ORAL at 21:05

## 2020-09-16 RX ADMIN — Medication 400 MG: at 05:02

## 2020-09-16 RX ADMIN — RISPERIDONE 0.5 MG: 0.5 TABLET ORAL at 05:05

## 2020-09-16 RX ADMIN — OXYCODONE HYDROCHLORIDE 5 MG: 5 TABLET ORAL at 08:04

## 2020-09-16 RX ADMIN — ENOXAPARIN SODIUM 40 MG: 40 INJECTION SUBCUTANEOUS at 05:04

## 2020-09-16 RX ADMIN — OXYCODONE HYDROCHLORIDE 5 MG: 5 TABLET ORAL at 17:16

## 2020-09-16 RX ADMIN — Medication 400 MG: at 17:16

## 2020-09-16 RX ADMIN — OXYCODONE HYDROCHLORIDE 5 MG: 5 TABLET ORAL at 11:10

## 2020-09-16 RX ADMIN — DIVALPROEX SODIUM 125 MG: 125 CAPSULE ORAL at 05:02

## 2020-09-16 RX ADMIN — DIVALPROEX SODIUM 125 MG: 125 CAPSULE ORAL at 21:05

## 2020-09-16 RX ADMIN — GABAPENTIN 100 MG: 100 CAPSULE ORAL at 11:10

## 2020-09-16 RX ADMIN — RISPERIDONE 1 MG: 1 TABLET ORAL at 17:15

## 2020-09-16 RX ADMIN — DIVALPROEX SODIUM 125 MG: 125 CAPSULE ORAL at 14:20

## 2020-09-16 RX ADMIN — OXYCODONE HYDROCHLORIDE 5 MG: 5 TABLET ORAL at 14:21

## 2020-09-16 RX ADMIN — GABAPENTIN 100 MG: 100 CAPSULE ORAL at 05:02

## 2020-09-16 RX ADMIN — GABAPENTIN 100 MG: 100 CAPSULE ORAL at 17:16

## 2020-09-16 RX ADMIN — LINEZOLID 600 MG: 600 TABLET, FILM COATED ORAL at 05:02

## 2020-09-16 RX ADMIN — AMLODIPINE BESYLATE 5 MG: 5 TABLET ORAL at 05:01

## 2020-09-16 ASSESSMENT — PAIN DESCRIPTION - PAIN TYPE
TYPE: CHRONIC PAIN

## 2020-09-16 NOTE — CARE PLAN
Problem: Safety  Goal: Will remain free from falls  Outcome: PROGRESSING AS EXPECTED  Note: Assess LOC, assess environment. Maintain a clutter-free environment, refuses bed alarm after multiple education, bed lowered and locked, non-skid socks in use, call light in place, personal belongings within reach. Offered toileting. Fall precautions in place.      Problem: Knowledge Deficit  Goal: Knowledge of disease process/condition, treatment plan, diagnostic tests, and medications will improve  Outcome: PROGRESSING SLOWER THAN EXPECTED  Intervention: Assess knowledge level of disease process/condition, treatment plan, diagnostic tests, and medications  Note: Update POC, encourage questions or to verbalize any concerns

## 2020-09-16 NOTE — PROGRESS NOTES
"Mr. Varela is a 64-year-old male with PMH significant for homelessness, etoh use, tobacco dependence and chronic LLE wound who presented 8/7/2020 with LLE wound infection.  He was hospitalized at our facility in April and evaluated by orthopedic surgery who recommended wound care.  Wound cultures at that time grew MSSA and strep.  Patient reported losing his Medicaid card and having no transportation to wound clinic.  He was seen at [facility] earlier this week and prescribed ABX, however he has not filled the prescription.  US LLE negative for DVT.  Treated for sepsis with empiric ABX and wound care.  He was found to have head lice and underwent treatments. Continued to have intermittent agitation so was started on risperdol, depakote and gabapentin per psych recs.  He has been deemed incapacitated to make medical decisions and is currently pending guardianship and placement.  He is medically stable and will be only be evaluated 2 times weekly unless there is a clinical change.          9/16:  Patient sitting on bed watching TV.  Wound vac in place.  His mood appears stable.  He does report chronic pain, but states \"I can handle it because I used to be a \".  No change in ROS or PE.  VSS.          "

## 2020-09-16 NOTE — PROGRESS NOTES
1915 Received report from day RN Cong, POC discussed. Call light in place, bed lowered and locked. Encourage pt to call if needed anything. Pt A&Ox3, confused with event, RA, restless, wound vac in place LLE

## 2020-09-16 NOTE — WOUND TEAM
[facility]  Inpatient Services   Wound and Skin Care Progress Note    Admission Date: 8/7/2020     Last order of IP CONSULT TO WOUND CARE was found on 9/1/2020 from Hospital Encounter on 8/7/2020     HPI, PMH, SH: Reviewed    Unit where seen by Wound Team: T309/00     WOUND CONSULT/FOLLOW UP RELATED TO:  Left leg scheduled NPWT change     Self Report / Pain Level:  Pre-medicated with PO oxy 5mg. 0/10 pain.        OBJECTIVE:  Patient sitting in bed with dressing intact. Very pleasant and willing for dressing change to be completed.     WOUND TYPE, LOCATION, CHARACTERISTICS (Pressure Injuries: location, stage, POA or date identified)      Negative Pressure Wound Therapy 09/03/20 Leg Lower;Medial;Posterior Left (Active)   NPWT Pump Mode / Pressure Setting Continuous;Ulta;125 mmHg    Dressing Type Black Foam (Veraflo)    Number of Foam Pieces Used 2    Canister Changed No    Output (mL) 500 mL    NEXT Dressing Change/Treatment Date 09/18/20    VAC VeraFlo Irrigant Normal Saline    VAC VeraFlo Soak Time (mins) 6    VAC VeraFlo Instill Volume (ml) 32    VAC VeraFlo - Therapy Time (hrs) 2    VAC VeraFlo Pressure (mm/Hg) Continuous;125 mmHg    WOUND NURSE ONLY - Time Spent with Patient (mins) 75            Wound 08/07/20 Full Thickness Wound Leg LLE posterior, extends medially and laterally (Active)   Wound Image       Site Assessment Pink;Red;Early/partial granulation    Periwound Assessment Clean;Dry;Scar tissue;Pink    Margins Attached edges;Defined edges    Closure Secondary intention    Drainage Amount Small    Drainage Description Serosanguineous    Treatments Cleansed;Site care;Offloading;Tape change    Wound Cleansing Approved Wound Cleanser    Periwound Protectant Benzoin;Paste Ring;Drape    Dressing Cleansing/Solutions Normal Saline    Dressing Options Wound Vac    Dressing Changed Changed    Dressing Status Clean;Dry;Intact    Dressing Change/Treatment Frequency Monday, Wednesday, Friday, and As  Needed    NEXT Dressing Change/Treatment Date 20    NEXT Weekly Photo (Inpatient Only) 20    Non-staged Wound Description Full thickness            Vascular:    CESAR:   CESAR Results, Last 30 Days Us-cesar Single Level Bilat    Result Date: 2020  Narrative  Vascular Laboratory  Conclusions  1.  Normal bilateral CESAR'GRUPO Jesus  Age:    64    Gender:     M  MRN:    [id number]  :    1955      BSA:  Exam Date:     2020 09:59  Room #:     Inpatient  Priority:     Routine  Ht (in):             Wt (lb):  Ordering Physician:        EDDA CANADA  Referring Physician:       [id number]  Sonographer:               Deja Ellis [facility]                             RVT  Study Type:                Complete Bilateral  Technical Quality:         Adequate  Indications:     Ulceration of LE  CPT Codes:       79629  ICD Codes:       707.1  History:         Nonhealing wound of left lower extremity.  Limitations:                 RIGHT  Waveform            Systolic BPs (mmHg)                             117           Brachial  Triphasic                                Common Femoral  Triphasic                  138           Posterior Tibial  Triphasic                  132           Dorsalis Pedis                                           Peroneal                             1.18          CESAR                                           TBI                       LEFT  Waveform        Systolic BPs (mmHg)                             114           Brachial  Triphasic                                Common Femoral  Triphasic                  127           Posterior Tibial  Triphasic                  122           Dorsalis Pedis                                           Peroneal                             1.09          CESAR                                           TBI  Findings  Right.  Doppler waveforms of the common femoral artery are of  high amplitude and  triphasic.  Doppler waveforms at the ankle are brisk and triphasic.  Ankle-brachial index is normal.  Left.  Doppler waveforms of the common femoral artery are of high amplitude and  triphasic.  Doppler waveforms at the ankle are brisk and triphasic.  Ankle-brachial index is normal.  Unable to obtained popliteal waveforms due to wound bandages.  Additional testing was not performed in accordance with lower extremity  arterial evaluation protocol.  Clover Maya  (Electronically Signed)  Final Date:      02 September 2020                   11:14      Lab Values:    Lab Results   Component Value Date/Time    WBC 6.5 09/03/2020 11:31 AM    RBC 3.82 (L) 09/03/2020 11:31 AM    HEMOGLOBIN 11.2 (L) 09/03/2020 11:31 AM    HEMATOCRIT 35.0 (L) 09/03/2020 11:31 AM    CREACTPROT 1.07 (H) 08/12/2020 01:55 PM    SEDRATEWES 85 (H) 08/07/2020 01:20 PM    HBA1C 5.7 (H) 11/09/2018 06:30 PM        Culture Results show:  Recent Results (from the past 720 hour(s))   CULTURE WOUND W/ GRAM STAIN    Collection Time: 09/01/20  2:00 PM    Specimen: Left Leg; Wound   Result Value Ref Range    Significant Indicator POS (POS)     Source WND     Site LEFT LEG     Culture Result - (A)     Gram Stain Result       Moderate Gram positive cocci.  Moderate Gram positive rods.      Culture Result (A)      Beta Hemolytic Streptococcus group A  Moderate growth      Culture Result (A)      Methicillin Resistant Staphylococcus aureus  Moderate growth      Culture Result (A)      Diphtheroids  Moderate growth  Mixed morphologies.         Susceptibility    Methicillin resistant staphylococcus aureus - CATHERINE     Azithromycin >4 Resistant mcg/mL     Clindamycin <=0.5 Sensitive mcg/mL     Cefazolin <=8 Resistant mcg/mL     Ceftaroline <=0.5 Sensitive mcg/mL     Daptomycin <=1 Sensitive mcg/mL     Ampicillin/sulbactam 16/8 Resistant mcg/mL     Erythromycin >4 Resistant mcg/mL     Vancomycin 1 Sensitive mcg/mL     Oxacillin >2 Resistant mcg/mL      Penicillin >8 Resistant mcg/mL     Trimeth/Sulfa <=0.5/9.5 Sensitive mcg/mL     Tetracycline <=4 Sensitive mcg/mL       INTERVENTIONS BY WOUND TEAM:  Patient and chart reviewed. Patient removed pants and socks and laid on his stomach. 2 layer compression wrap and wound vac dressing removed, no retained foam. Cleansed wound with approved wound cleanser and gauze. Pat dry. periwound prepped with no-sting skin prep and paste ring. One piece of spiral black VF foam was placed into entire wound bed and secured with drape. Hole cut and button and trak pad applied on proximal/superior aspect of wound. Suction obtained at 125mmhg, NS test cycle completed. Tubi  in place to thigh and over knee area, applied 2 layer compression wrap to LLE going from base of toes to base of knee. Patient placed pants back on. Bedside RN Consuelo updated. Orders updated.     Interdisciplinary consultation: Patient, Bedside RN (Consuelo), wound tech Sapna    EVALUATION / RATIONALE FOR TREATMENT: Wound bed with granular tissue, edges are thick but appear better than previous assessments. LPS APN to assess at next NPWT dressing change. NPWT VF to encourage closure by secondary intention and manage drainage while encouraging oxygenation and granulation to the wound bed. 2 layer compression to assist in decrease of swelling and encourage blood flow to wounds. Wound team to follow up and change 3x/week.      Goals: Steady decrease in wound area and depth weekly.    NURSING PLAN OF CARE ORDERS (X):    Dressing changes: See Dressing Care orders: X  Skin care: See Skin Care orders:   Rectal tube care: See Rectal Tube Care orders:   Other orders:      WOUND TEAM PLAN OF CARE:   Dressing changes by wound team:                   Follow up 3 times weekly:                NPWT change 3 times weekly:   X  Follow up 1-2 times weekly:      Follow up Bi-Monthly:                   Follow up as needed:       Other (explain):     Anticipated discharge plans:  pending guardianship.   LTACH:        SNF/Rehab: X - patient will need ongoing wound care for LLE upon discharge - 3x/week.                 Home Health Care:           Outpatient Wound Center:            Self Care:

## 2020-09-17 PROCEDURE — 700105 HCHG RX REV CODE 258

## 2020-09-17 PROCEDURE — A9270 NON-COVERED ITEM OR SERVICE: HCPCS | Performed by: NURSE PRACTITIONER

## 2020-09-17 PROCEDURE — 700102 HCHG RX REV CODE 250 W/ 637 OVERRIDE(OP): Performed by: NURSE PRACTITIONER

## 2020-09-17 PROCEDURE — 770021 HCHG ROOM/CARE - ISO PRIVATE

## 2020-09-17 RX ORDER — SODIUM CHLORIDE 9 MG/ML
INJECTION, SOLUTION INTRAVENOUS
Status: COMPLETED
Start: 2020-09-17 | End: 2020-09-17

## 2020-09-17 RX ADMIN — OXYCODONE HYDROCHLORIDE 5 MG: 5 TABLET ORAL at 14:56

## 2020-09-17 RX ADMIN — DIVALPROEX SODIUM 125 MG: 125 CAPSULE ORAL at 20:10

## 2020-09-17 RX ADMIN — AMLODIPINE BESYLATE 5 MG: 5 TABLET ORAL at 04:41

## 2020-09-17 RX ADMIN — DIVALPROEX SODIUM 125 MG: 125 CAPSULE ORAL at 14:56

## 2020-09-17 RX ADMIN — RISPERIDONE 1 MG: 1 TABLET ORAL at 16:24

## 2020-09-17 RX ADMIN — SODIUM CHLORIDE 1000 ML: 9 INJECTION, SOLUTION INTRAVENOUS at 00:05

## 2020-09-17 RX ADMIN — OXYCODONE HYDROCHLORIDE 5 MG: 5 TABLET ORAL at 00:04

## 2020-09-17 RX ADMIN — OXYCODONE HYDROCHLORIDE 5 MG: 5 TABLET ORAL at 04:42

## 2020-09-17 RX ADMIN — ACETAMINOPHEN 650 MG: 325 TABLET, FILM COATED ORAL at 14:56

## 2020-09-17 RX ADMIN — DIVALPROEX SODIUM 125 MG: 125 CAPSULE ORAL at 04:41

## 2020-09-17 RX ADMIN — Medication 400 MG: at 16:25

## 2020-09-17 RX ADMIN — GABAPENTIN 100 MG: 100 CAPSULE ORAL at 16:24

## 2020-09-17 RX ADMIN — RISPERIDONE 0.5 MG: 0.5 TABLET ORAL at 04:41

## 2020-09-17 RX ADMIN — OXYCODONE HYDROCHLORIDE 5 MG: 5 TABLET ORAL at 20:10

## 2020-09-17 RX ADMIN — GABAPENTIN 100 MG: 100 CAPSULE ORAL at 04:42

## 2020-09-17 RX ADMIN — Medication 400 MG: at 04:41

## 2020-09-17 ASSESSMENT — PAIN DESCRIPTION - PAIN TYPE
TYPE: CHRONIC PAIN

## 2020-09-17 NOTE — CARE PLAN
Problem: Safety  Goal: Will remain free from falls  Outcome: PROGRESSING AS EXPECTED  Note: Assess LOC, assess environment. Maintain a clutter-free environment, refuses bed alarm on, bed lowered and locked, non-skid socks in use, call light in place, personal belongings within reach. Offered toileting. Fall precautions in place.      Problem: Pain Management  Goal: Pain level will decrease to patient's comfort goal  Outcome: PROGRESSING AS EXPECTED  Intervention: Follow pain managment plan developed in collaboration with patient and Interdisciplinary Team  Note: Educate use of pain rating scale from 0-10. Assess oxygen, RR level and other V/S. Assess pain level per protocol.  Administer pain medications as ordered. Re-evaluate as necessary.       Intervention: Educate and implement non-pharmacologic comfort measures. Examples: relaxation, distration, play therapy, activity therapy, massage, etc.  Note:   Demonstrate non-pharmacological ways to control pain like deep breathing, ice packs, repositioning.     Problem: Pain Management  Goal: Pain level will decrease to patient's comfort goal  Intervention: Educate and implement non-pharmacologic comfort measures. Examples: relaxation, distration, play therapy, activity therapy, massage, etc.  Note:   Demonstrate non-pharmacological ways to control pain like deep breathing, ice packs, repositioning.

## 2020-09-17 NOTE — CARE PLAN
Problem: Safety  Goal: Will remain free from falls  Outcome: PROGRESSING AS EXPECTED  Sitter at door monitoring patient. Patient's bed is in lowest position & locked with upper bed rails up. Call light within reach.     Problem: Mobility  Goal: Risk for activity intolerance will decrease  Outcome: PROGRESSING AS EXPECTED  Patient is up self without use of DME. Patient able to  wound vac and transport with him to and from bathroom.

## 2020-09-17 NOTE — CARE PLAN
"  Problem: Safety  Goal: Will remain free from falls  Outcome: PROGRESSING AS EXPECTED     No falls or other injuries. Pt ambulates frequently in room    Problem: Knowledge Deficit  Goal: Knowledge of disease process/condition, treatment plan, diagnostic tests, and medications will improve  Outcome: PROGRESSING SLOWER THAN EXPECTED  Goal: Knowledge of the prescribed therapeutic regimen will improve  Outcome: PROGRESSING SLOWER THAN EXPECTED     Problem: Pain Management  Goal: Pain level will decrease to patient's comfort goal  Outcome: PROGRESSING SLOWER THAN EXPECTED     Pt has frequent outbursts for no reason - usually regarding Wound Vac cords.  Pt also very intolerant of waiting for pain medication and asks for more frequently.  Pt unreliable for pain scale as his pain is \"always a 10.\"  "

## 2020-09-17 NOTE — PROGRESS NOTES
"Pt adamantly refuses bowel management program, patient shouting\" I don't want no d*mn laxatives\" then hitting his head repeatedly  "

## 2020-09-17 NOTE — PROGRESS NOTES
1924 Received report from day RN Consuelo, POC discussed. Call light in place, bed lowered and locked. Encourage pt to call if needed anything. Pt A&Ox4, RA, restless, wound vac in place LLE, on  Legal hold, safety sitter in place.

## 2020-09-17 NOTE — PROGRESS NOTES
Came into room and disc from wound vac is not on the patient, replaced the disc with new cord with EKATERINA Church, wound vac fully functioning, pt tolerated procedure well.

## 2020-09-17 NOTE — DISCHARGE PLANNING
Anticipated Discharge Disposition:      Action: Guardianship paperwork emailed to Teri Madrigal. Teri Madrigal confirmed e-mail was received.     Barriers to Discharge: legal guardian to make decision regarding placement     Plan: F/U with with  Teri Lewis regarding guardianship (typically a 3 month process). Pt to DC to  with wounds resolved as goal.

## 2020-09-18 PROCEDURE — A9270 NON-COVERED ITEM OR SERVICE: HCPCS | Performed by: NURSE PRACTITIONER

## 2020-09-18 PROCEDURE — 11042 DBRDMT SUBQ TIS 1ST 20SQCM/<: CPT | Performed by: NURSE PRACTITIONER

## 2020-09-18 PROCEDURE — 97606 NEG PRS WND THER DME>50 SQCM: CPT

## 2020-09-18 PROCEDURE — 700102 HCHG RX REV CODE 250 W/ 637 OVERRIDE(OP): Performed by: NURSE PRACTITIONER

## 2020-09-18 PROCEDURE — 302098 PASTE RING (FLAT): Performed by: NURSE PRACTITIONER

## 2020-09-18 PROCEDURE — 700111 HCHG RX REV CODE 636 W/ 250 OVERRIDE (IP): Performed by: NURSE PRACTITIONER

## 2020-09-18 PROCEDURE — 770021 HCHG ROOM/CARE - ISO PRIVATE

## 2020-09-18 RX ADMIN — OXYCODONE HYDROCHLORIDE 5 MG: 5 TABLET ORAL at 11:54

## 2020-09-18 RX ADMIN — Medication 400 MG: at 06:17

## 2020-09-18 RX ADMIN — OXYCODONE HYDROCHLORIDE 5 MG: 5 TABLET ORAL at 21:21

## 2020-09-18 RX ADMIN — RISPERIDONE 1 MG: 1 TABLET ORAL at 16:49

## 2020-09-18 RX ADMIN — Medication 400 MG: at 16:49

## 2020-09-18 RX ADMIN — RISPERIDONE 0.5 MG: 0.5 TABLET ORAL at 06:17

## 2020-09-18 RX ADMIN — GABAPENTIN 100 MG: 100 CAPSULE ORAL at 16:50

## 2020-09-18 RX ADMIN — AMLODIPINE BESYLATE 5 MG: 5 TABLET ORAL at 06:17

## 2020-09-18 RX ADMIN — GABAPENTIN 100 MG: 100 CAPSULE ORAL at 06:17

## 2020-09-18 RX ADMIN — DIVALPROEX SODIUM 125 MG: 125 CAPSULE ORAL at 21:21

## 2020-09-18 RX ADMIN — OXYCODONE HYDROCHLORIDE 5 MG: 5 TABLET ORAL at 16:49

## 2020-09-18 RX ADMIN — DIVALPROEX SODIUM 125 MG: 125 CAPSULE ORAL at 14:02

## 2020-09-18 RX ADMIN — GABAPENTIN 100 MG: 100 CAPSULE ORAL at 11:54

## 2020-09-18 RX ADMIN — OXYCODONE HYDROCHLORIDE 5 MG: 5 TABLET ORAL at 06:17

## 2020-09-18 RX ADMIN — DIVALPROEX SODIUM 125 MG: 125 CAPSULE ORAL at 06:17

## 2020-09-18 ASSESSMENT — PAIN DESCRIPTION - PAIN TYPE
TYPE: CHRONIC PAIN

## 2020-09-18 NOTE — PROGRESS NOTES
"Mr. Varela is a 64-year-old male with PMH significant for homelessness, etoh use, tobacco dependence and chronic LLE wound who presented 8/7/2020 with LLE wound infection.  He was hospitalized at our facility in April and evaluated by orthopedic surgery who recommended wound care.  Wound cultures at that time grew MSSA and strep.  Patient reported losing his Medicaid card and having no transportation to wound clinic.  He was seen at [facility] earlier this week and prescribed ABX, however he has not filled the prescription.  US LLE negative for DVT.  Treated for sepsis with empiric ABX and wound care.  He was found to have head lice and underwent treatments. Continued to have intermittent agitation so was started on risperdol, depakote and gabapentin per psych recs.  He has been deemed incapacitated to make medical decisions and is currently pending guardianship and placement.  He is medically stable and will be only be evaluated 2 times weekly unless there is a clinical change.          9/16:  Patient sitting on bed watching TV.  Wound vac in place.  His mood appears stable.  He does report chronic pain, but states \"I can handle it because I used to be a \".  No change in ROS or PE.  VSS.      9/18:  Pending group home with home health for wound care.  CM following. He remains in good spirits.  He is concerned about where he is going to when he discharged as he has no income.  Encouraged patient that we would help when the time comes.  Wound vac intact.          "

## 2020-09-18 NOTE — WOUND TEAM
[facility]  Inpatient Services   Wound and Skin Care Progress Note    Admission Date: 8/7/2020     Last order of IP CONSULT TO WOUND CARE was found on 9/1/2020 from Hospital Encounter on 8/7/2020     HPI, PMH, SH: Reviewed    Unit where seen by Wound Team: T309/00     WOUND CONSULT/FOLLOW UP RELATED TO:  Left leg scheduled NPWT change     Self Report / Pain Level:  Pre-medicated with PO oxy 5mg. 0/10 pain.        OBJECTIVE:  Patient sitting in bed with dressing intact. Very pleasant and willing for dressing change to be completed.     WOUND TYPE, LOCATION, CHARACTERISTICS (Pressure Injuries: location, stage, POA or date identified)        Negative Pressure Wound Therapy 09/03/20 Leg Lower;Medial;Posterior Left (Active)   NPWT Pump Mode / Pressure Setting Continuous;Ulta;125 mmHg 09/18/20 1600   Dressing Type Black Foam (Veraflo) 09/18/20 1600   Number of Foam Pieces Used 2 09/16/20 1130   Canister Changed No 09/18/20 1600   Output (mL) 450 mL 09/17/20 0358   NEXT Dressing Change/Treatment Date 09/18/20 09/16/20 1130   VAC VeraFlo Irrigant Normal Saline 09/18/20 1600   VAC VeraFlo Soak Time (mins) 6 09/18/20 1600   VAC VeraFlo Instill Volume (ml) 32 09/18/20 1600   VAC VeraFlo - Therapy Time (hrs) 2 09/18/20 1600   VAC VeraFlo Pressure (mm/Hg) Continuous;125 mmHg 09/18/20 1600   WOUND NURSE ONLY - Time Spent with Patient (mins) 75 09/16/20 1130           Wound 08/07/20 Full Thickness Wound Leg LLE posterior, extends medially and laterally (Active)   Wound Image    09/18/20 1556   Site Assessment Pink;Red;Early/partial granulation 09/18/20 1600   Periwound Assessment Clean;Dry;Scar tissue;Pink 09/18/20 1600   Margins Attached edges;Defined edges 09/18/20 1600   Closure Secondary intention 09/18/20 1600   Drainage Amount Small 09/18/20 1600   Drainage Description Serosanguineous 09/18/20 1600   Treatments Cleansed;Site care;Offloading;Tape change 09/16/20 1130   Wound Cleansing Approved Wound Cleanser  09/18/20 1600   Periwound Protectant Benzoin;Paste Ring;Drape 09/18/20 1600   Dressing Cleansing/Solutions Normal Saline 09/18/20 1600   Dressing Options Wound Vac 09/18/20 1600   Dressing Changed Observed 09/18/20 0728   Dressing Status Clean;Dry;Intact 09/18/20 1600   Dressing Change/Treatment Frequency Monday, Wednesday, Friday, and As Needed 09/18/20 1600   NEXT Dressing Change/Treatment Date 09/18/20 09/17/20 2010   NEXT Weekly Photo (Inpatient Only) 09/18/20 09/17/20 2010   Non-staged Wound Description Full thickness 09/18/20 1600   Wound Length (cm) 32 cm 09/18/20 1600   Wound Width (cm) 14 cm 09/18/20 1600   Wound Depth (cm) 0.3 cm 09/18/20 1600   Wound Surface Area (cm^2) 448 cm^2 09/18/20 1600   Wound Volume (cm^3) 134.4 cm^3 09/18/20 1600   Post-Procedure Length (cm) 36 cm 09/09/20 1300   Post-Procedure Width (cm) 16 cm 09/09/20 1300   Post-Procedure Depth (cm) 0.3 cm 09/09/20 1300   Post-Procedure Surface Area (cm^2) 576 cm^2 09/09/20 1300   Post-Procedure Volume (cm^3) 172.8 cm^3 09/09/20 1300   Wound Healing % -155 09/18/20 1600   Wound Bed Granulation (%) 100 % 09/18/20 1600   Wound Bed Epithelium (%) 0 % 09/01/20 1300   Wound Bed Slough (%) 50 % 09/09/20 1300   Wound Bed Granulation (%) - Post-Procedure 0 % 09/01/20 1300   Wound Bed Epithelium % - (Post-Procedure) 0 % 09/01/20 1300   Wound Bed Slough % - (Post-Procedure) 0 % 09/01/20 1300   Wound Bed Eschar % - (Post-Procedure) 0 % 09/01/20 1300   Tunneling (cm) 0 cm 09/03/20 1300   Undermining (cm) 0 cm 09/03/20 1300   Shape wishbone 09/11/20 1210   Wound Odor Mild 09/14/20 1100   Pulses Left;2+;DP;PT 09/14/20 1100   Exposed Structures None 09/14/20 1100   WOUND NURSE ONLY - Time Spent with Patient (mins) 75 09/11/20 1210          Vascular:    KOSTAS:   KOSTAS Results, Last 30 Days Us-kostas Single Level Bilat    Result Date: 9/2/2020  Narrative  Vascular Laboratory  Conclusions  1.  Normal bilateral KOSTAS's.  GRUPO GANN  Age:    64    Gender:     M  MRN:     [id number]  :    1955      BSA:  Exam Date:     2020 09:59  Room #:     Inpatient  Priority:     Routine  Ht (in):             Wt (lb):  Ordering Physician:        EDDA CANADA  Referring Physician:       [id number]  Sonographer:               Deja lElis RDMS                             RVT  Study Type:                Complete Bilateral  Technical Quality:         Adequate  Indications:     Ulceration of LE  CPT Codes:       55228  ICD Codes:       707.1  History:         Nonhealing wound of left lower extremity.  Limitations:                 RIGHT  Waveform            Systolic BPs (mmHg)                             117           Brachial  Triphasic                                Common Femoral  Triphasic                  138           Posterior Tibial  Triphasic                  132           Dorsalis Pedis                                           Peroneal                             1.18          KOSTAS                                           TBI                       LEFT  Waveform        Systolic BPs (mmHg)                             114           Brachial  Triphasic                                Common Femoral  Triphasic                  127           Posterior Tibial  Triphasic                  122           Dorsalis Pedis                                           Peroneal                             1.09          KOSTAS                                           TBI  Findings  Right.  Doppler waveforms of the common femoral artery are of high amplitude and  triphasic.  Doppler waveforms at the ankle are brisk and triphasic.  Ankle-brachial index is normal.  Left.  Doppler waveforms of the common femoral artery are of high amplitude and  triphasic.  Doppler waveforms at the ankle are brisk and triphasic.  Ankle-brachial index is normal.  Unable to obtained popliteal waveforms due to wound bandages.  Additional  testing was not performed in accordance with lower extremity  arterial evaluation protocol.  Clover Maya  (Electronically Signed)  Final Date:      02 September 2020                   11:14      Lab Values:    Lab Results   Component Value Date/Time    WBC 6.5 09/03/2020 11:31 AM    RBC 3.82 (L) 09/03/2020 11:31 AM    HEMOGLOBIN 11.2 (L) 09/03/2020 11:31 AM    HEMATOCRIT 35.0 (L) 09/03/2020 11:31 AM    CREACTPROT 1.07 (H) 08/12/2020 01:55 PM    SEDRATEWES 85 (H) 08/07/2020 01:20 PM    HBA1C 5.7 (H) 11/09/2018 06:30 PM        Culture Results show:  Recent Results (from the past 720 hour(s))   CULTURE WOUND W/ GRAM STAIN    Collection Time: 09/01/20  2:00 PM    Specimen: Left Leg; Wound   Result Value Ref Range    Significant Indicator POS (POS)     Source WND     Site LEFT LEG     Culture Result - (A)     Gram Stain Result       Moderate Gram positive cocci.  Moderate Gram positive rods.      Culture Result (A)      Beta Hemolytic Streptococcus group A  Moderate growth      Culture Result (A)      Methicillin Resistant Staphylococcus aureus  Moderate growth      Culture Result (A)      Diphtheroids  Moderate growth  Mixed morphologies.         Susceptibility    Methicillin resistant staphylococcus aureus - CATHERINE     Azithromycin >4 Resistant mcg/mL     Clindamycin <=0.5 Sensitive mcg/mL     Cefazolin <=8 Resistant mcg/mL     Ceftaroline <=0.5 Sensitive mcg/mL     Daptomycin <=1 Sensitive mcg/mL     Ampicillin/sulbactam 16/8 Resistant mcg/mL     Erythromycin >4 Resistant mcg/mL     Vancomycin 1 Sensitive mcg/mL     Oxacillin >2 Resistant mcg/mL     Penicillin >8 Resistant mcg/mL     Trimeth/Sulfa <=0.5/9.5 Sensitive mcg/mL     Tetracycline <=4 Sensitive mcg/mL       INTERVENTIONS BY WOUND TEAM:  Dressings removed, wound cleansed with wound cleanser, Asaf MORNEO in to debride.  Cleansed wound again prepped addison wound with benzoin, paste ring and drape, black VF foam onto wound bed and sealed with drape, foam  under TRAC pad and achieved NPWT 125 mmHg continuous with VF setting per LDA  Interdisciplinary consultation: Patient, Bedside RN    EVALUATION / RATIONALE FOR TREATMENT: 9/18 wound granulating nicely and responding well to current treatment.      9/16Wound bed with granular tissue, edges are thick but appear better than previous assessments. . NPWT VF to encourage closure by secondary intention and manage drainage while encouraging oxygenation and granulation to the wound bed. 2 layer compression to assist in decrease of swelling and encourage blood flow to wounds. Wound team to follow up and change 3x/week.      Goals: Steady decrease in wound area and depth weekly.    NURSING PLAN OF CARE ORDERS (X):    Dressing changes: See Dressing Care orders: X  Skin care: See Skin Care orders:   Rectal tube care: See Rectal Tube Care orders:   Other orders:      WOUND TEAM PLAN OF CARE:   Dressing changes by wound team:                   Follow up 3 times weekly:                NPWT change 3 times weekly:   X  Follow up 1-2 times weekly:      Follow up Bi-Monthly:                   Follow up as needed:       Other (explain):     Anticipated discharge plans: pending guardianship.   LTACH:        SNF/Rehab: X - patient will need ongoing wound care for LLE upon discharge - 3x/week.                 Home Health Care:           Outpatient Wound Center:            Self Care:

## 2020-09-18 NOTE — DISCHARGE PLANNING
Anticipated Discharge Disposition: [facility]    Action: E-mailed Vazquez, director of [facility] ([email or web address] P: [phone]) requesting a call back to this RN CM to discuss possible acceptance of pt with HH for wound care.     Barriers to Discharge: [facility] review and acceptance    Plan: if [facility] accepts patient will set up HH

## 2020-09-18 NOTE — CARE PLAN
Problem: Psychosocial Needs:  Goal: Level of anxiety will decrease  Outcome: PROGRESSING AS EXPECTED  Patient is calm at the moment, however, patient is easily agitated and gets easily frustrated. For example, the wound vac acts as a barrier to ambulation and patient often accidentally pulls the power cord from the wound vac. He then curses and yells while attempting to plug the cord back in. This RN is able to calm patient down by providing education about ambulating with the wound vac and how to reattach the power cord.     Problem: Mobility  Goal: Risk for activity intolerance will decrease  Outcome: PROGRESSING AS EXPECTED  Patient is up self inside room and to/from bathroom. Patient carries wound vac with self while ambulating.

## 2020-09-18 NOTE — PROCEDURES
"WOUND CARE PROVIDER PROCEDURE NOTE            HPI: 64-year-old male homelessness, EtOH use, tobacco dependence, chronic left lower extremity wound admitted 8/7/2020 with left lower extremity wound infection.  Patient was recently hospitalized at [facility] in April 2020, evaluated by orthopedic surgery who recommended wound care.  Wound culture grew MSSA and strep.  Patient was recently seen at [facility] prior to this admission was given a prescription for antibiotics but did not fill this.  Patient reports that he has had this wound for 8 to 10 years.  He reports that he fell and went \"ass over tea kettle\".  He reports that he would clean the wound almost daily with soap and water at the homeless shelter.  Per chart review, patient reported he developed the ulcer in May 2018 when he fell while hiking.  Patient was seen at wound care clinic in August 2018.  Patient had a  assisting him while he was living at [facility] care housing.  Wound VAC /was ordered however  had concerns with patient's compliancy and/ access to medical care.  Skilled nursing facility was contacted to have patient be admitted, however he was not accepted due to Medicaid.    Patient is on Lovenox 40 mg daily.  Refused today however he took it yesterday.  Allergies reviewed.  He denies any allergies.      Interval hx:   9/11/2020: pt calm cooperative. On zyvox. Seen during VAC and two layer compression wrap change. Pt tolerating VAC and wraps. Wound decreased in size.   9/18/2020: Patient denies any fevers, chills, nausea, vomiting.  He is tolerating the veraflo wound VAC and 2 layer compression wrap.  Wound is decreasing in size.  Increased granular tissue noted, wound edges have improved however he does have rolled edges to popliteal fossa area.  Patient calm and cooperative, watching sports.    Results:  Wound cx: 9/1/2020 mrsa, diphtheroids, beta hemolytic strep group A    8/7/2020: X-ray tib-fib left:  1.  Healed distal " metaphyseal fractures of the left fibula and tibia with tibial IM layla in place.     2.  No acute fracture or dislocation.     3.  No radiopaque soft tissue foreign body.      Arterial studies:   9/2/2020  Right.    Doppler waveforms of the common femoral artery are of high amplitude and    triphasic.    Doppler waveforms at the ankle are brisk and triphasic.    Ankle-brachial index is normal.       Left.    Doppler waveforms of the common femoral artery are of high amplitude and    triphasic.    Doppler waveforms at the ankle are brisk and triphasic.    Ankle-brachial index is normal.    Unable to obtained popliteal waveforms due to wound bandages.        Exam:  Palpable PT pulse to left foot +1   +2 DP left foot  Difficulty palpating popliteal due to scar tissue  less scar tissue   rolled wound edges only to popliteal fossa  increased granular tissue, 97%, 3% slough  No odor  wound bed moist  No erythema   Able to extend left leg to 170 degrees. Able to flex left leg around 80 degrees            The procedure, rationale for doing so, and the possible risks- including infection, pain and bleeding- have been discussed with the patient.  The patient  verbalized understanding and is agreeable with proceeding.       DESCRIPTION OF PROCEDURE:  Excision of skin, subcutaneous tissue including chronic rolled wound edges to left lower leg ulcer    PMH, medications and recent labs reviewed      PROCEDURE: A formal timeout was performed to confirm patient's correct name, correct site, correct procedure and correct laterality.      -Curette  used to excise closed wound edges, slough from the wound bed.  Total area debrided, 15 cm².  Debridement carried into the subcutaneous tissue layer.   -Bleeding controlled with manual pressure.    Wound care completed by wound PT - refer to flowsheet and note  -Patient tolerated the procedure well, without significant c/o pain or discomfort.      Pre-debridement measurements: 32 x 13.5 x  0.2 cm  Post debridement measurements: 32 x 14 x 0.3 cm    Post debridement left lower leg posterior post debride      R leg posterior lateral post debride      R posterior leg                     ASSESSMENT/PLAN:  64-year-old male with homelessness, EtOH use, tobacco use, and chronic left leg ulcer.  S/P bedside excisional debridement performed today.  Medically necessary to promote wound healing.      -continue veraflo VAC with 2 layer compression wrap. veraflo VAC to be continued while pt is admitted.   -Does not need veraflo VAC at discharge.   -completed abx, continue to monitor for s/s infection  - Patient to continue to be seen by wound care team while admitted  Patient to continue to be followed by wound care nurse practitioner as he will require serial excisional debridements  -IP consult for foot nail care      Discharge plan:  Pending guardianship

## 2020-09-18 NOTE — CARE PLAN
Problem: Venous Thromboembolism (VTW)/Deep Vein Thrombosis (DVT) Prevention:  Goal: Patient will participate in Venous Thrombosis (VTE)/Deep Vein Thrombosis (DVT)Prevention Measures  Outcome: PROGRESSING AS EXPECTED   Patient walks around room frequently, holding wound vac. Refuses SCDs, but very frequently moving around through shift.     Problem: Pain Management  Goal: Pain level will decrease to patient's comfort goal  Outcome: PROGRESSING AS EXPECTED   Patient reports chronic pain in leg, in addition to discomfort from wounds/wound vac. PRN oxycodone available, given per MAR.

## 2020-09-19 PROCEDURE — 700102 HCHG RX REV CODE 250 W/ 637 OVERRIDE(OP): Performed by: NURSE PRACTITIONER

## 2020-09-19 PROCEDURE — A9270 NON-COVERED ITEM OR SERVICE: HCPCS | Performed by: NURSE PRACTITIONER

## 2020-09-19 PROCEDURE — 700105 HCHG RX REV CODE 258

## 2020-09-19 PROCEDURE — 770021 HCHG ROOM/CARE - ISO PRIVATE

## 2020-09-19 RX ORDER — SODIUM CHLORIDE 9 MG/ML
INJECTION, SOLUTION INTRAVENOUS
Status: COMPLETED
Start: 2020-09-19 | End: 2020-09-19

## 2020-09-19 RX ADMIN — DIVALPROEX SODIUM 125 MG: 125 CAPSULE ORAL at 13:13

## 2020-09-19 RX ADMIN — SODIUM CHLORIDE 1000 ML: 9 INJECTION, SOLUTION INTRAVENOUS at 19:19

## 2020-09-19 RX ADMIN — Medication 400 MG: at 16:43

## 2020-09-19 RX ADMIN — OXYCODONE HYDROCHLORIDE 5 MG: 5 TABLET ORAL at 11:38

## 2020-09-19 RX ADMIN — GABAPENTIN 100 MG: 100 CAPSULE ORAL at 16:43

## 2020-09-19 RX ADMIN — RISPERIDONE 1 MG: 1 TABLET ORAL at 16:43

## 2020-09-19 RX ADMIN — DIVALPROEX SODIUM 125 MG: 125 CAPSULE ORAL at 06:27

## 2020-09-19 RX ADMIN — DIVALPROEX SODIUM 125 MG: 125 CAPSULE ORAL at 20:17

## 2020-09-19 RX ADMIN — OXYCODONE HYDROCHLORIDE 5 MG: 5 TABLET ORAL at 06:27

## 2020-09-19 RX ADMIN — OXYCODONE HYDROCHLORIDE 5 MG: 5 TABLET ORAL at 20:17

## 2020-09-19 RX ADMIN — OXYCODONE HYDROCHLORIDE 5 MG: 5 TABLET ORAL at 16:42

## 2020-09-19 RX ADMIN — GABAPENTIN 100 MG: 100 CAPSULE ORAL at 11:37

## 2020-09-19 RX ADMIN — OXYCODONE HYDROCHLORIDE 5 MG: 5 TABLET ORAL at 23:54

## 2020-09-19 RX ADMIN — RISPERIDONE 0.5 MG: 0.5 TABLET ORAL at 11:37

## 2020-09-19 ASSESSMENT — PAIN DESCRIPTION - PAIN TYPE
TYPE: CHRONIC PAIN

## 2020-09-19 NOTE — CARE PLAN
Problem: Pain Management  Goal: Pain level will decrease to patient's comfort goal  Outcome: PROGRESSING AS EXPECTED   Patient receiving oxycodone PRN Q3hrs for pain management. Patient complains of chronic R leg pain. Encouraged rest, distraction and relaxation in addition to pain medication.     Problem: Mobility  Goal: Risk for activity intolerance will decrease  Outcome: PROGRESSING AS EXPECTED   Patient walks around room frequently. Mobility encouraged.

## 2020-09-19 NOTE — CARE PLAN
Problem: Infection  Goal: Will remain free from infection  Outcome: PROGRESSING AS EXPECTED  Afebrile, has wound vac in place as ordered.      Problem: Pain Management  Goal: Pain level will decrease to patient's comfort goal  Outcome: PROGRESSING AS EXPECTED   Pain controlled with prn medication.

## 2020-09-20 PROCEDURE — 700102 HCHG RX REV CODE 250 W/ 637 OVERRIDE(OP): Performed by: NURSE PRACTITIONER

## 2020-09-20 PROCEDURE — A9270 NON-COVERED ITEM OR SERVICE: HCPCS | Performed by: NURSE PRACTITIONER

## 2020-09-20 PROCEDURE — 770021 HCHG ROOM/CARE - ISO PRIVATE

## 2020-09-20 RX ADMIN — DIVALPROEX SODIUM 125 MG: 125 CAPSULE ORAL at 07:13

## 2020-09-20 RX ADMIN — GABAPENTIN 100 MG: 100 CAPSULE ORAL at 07:12

## 2020-09-20 RX ADMIN — GABAPENTIN 100 MG: 100 CAPSULE ORAL at 16:14

## 2020-09-20 RX ADMIN — DIVALPROEX SODIUM 125 MG: 125 CAPSULE ORAL at 22:45

## 2020-09-20 RX ADMIN — Medication 400 MG: at 16:14

## 2020-09-20 RX ADMIN — OXYCODONE HYDROCHLORIDE 5 MG: 5 TABLET ORAL at 11:23

## 2020-09-20 RX ADMIN — AMLODIPINE BESYLATE 5 MG: 5 TABLET ORAL at 07:12

## 2020-09-20 RX ADMIN — OXYCODONE HYDROCHLORIDE 5 MG: 5 TABLET ORAL at 15:43

## 2020-09-20 RX ADMIN — RISPERIDONE 0.5 MG: 0.5 TABLET ORAL at 07:13

## 2020-09-20 RX ADMIN — Medication 400 MG: at 07:12

## 2020-09-20 RX ADMIN — GABAPENTIN 100 MG: 100 CAPSULE ORAL at 11:23

## 2020-09-20 RX ADMIN — OXYCODONE HYDROCHLORIDE 5 MG: 5 TABLET ORAL at 20:23

## 2020-09-20 RX ADMIN — DIVALPROEX SODIUM 125 MG: 125 CAPSULE ORAL at 15:43

## 2020-09-20 RX ADMIN — RISPERIDONE 1 MG: 1 TABLET ORAL at 16:14

## 2020-09-20 RX ADMIN — OXYCODONE HYDROCHLORIDE 5 MG: 5 TABLET ORAL at 07:12

## 2020-09-20 ASSESSMENT — PAIN DESCRIPTION - PAIN TYPE
TYPE: CHRONIC PAIN

## 2020-09-20 NOTE — CARE PLAN
Problem: Pain Management  Goal: Pain level will decrease to patient's comfort goal  Outcome: PROGRESSING AS EXPECTED  Pain controlled with prn medication.      Problem: Bowel/Gastric:  Goal: Normal bowel function is maintained or improved  Outcome: PROGRESSING SLOWER THAN EXPECTED   Patient passing gas, is not forthcoming with last bm, he does have very poor short term memory and does not retain much education.

## 2020-09-20 NOTE — CARE PLAN
Problem: Safety  Goal: Will remain free from falls  Outcome: PROGRESSING AS EXPECTED   Sitter at bed side due to elopement risk and impulsivity of patient. Patient educated on fall risk, and urged to call staff if he needs any assistance.     Problem: Discharge Barriers/Planning  Goal: Patient's continuum of care needs will be met  Outcome: PROGRESSING SLOWER THAN EXPECTED   Patient requires guardianship, social work/case management involved in discharge planning.

## 2020-09-21 PROCEDURE — A9270 NON-COVERED ITEM OR SERVICE: HCPCS | Performed by: NURSE PRACTITIONER

## 2020-09-21 PROCEDURE — 700102 HCHG RX REV CODE 250 W/ 637 OVERRIDE(OP): Performed by: NURSE PRACTITIONER

## 2020-09-21 PROCEDURE — 770021 HCHG ROOM/CARE - ISO PRIVATE

## 2020-09-21 PROCEDURE — 302098 PASTE RING (FLAT): Performed by: NURSE PRACTITIONER

## 2020-09-21 PROCEDURE — 700105 HCHG RX REV CODE 258

## 2020-09-21 PROCEDURE — 29581 APPL MULTLAYER CMPRN SYS LEG: CPT

## 2020-09-21 PROCEDURE — 97606 NEG PRS WND THER DME>50 SQCM: CPT

## 2020-09-21 RX ORDER — SODIUM CHLORIDE 9 MG/ML
INJECTION, SOLUTION INTRAVENOUS
Status: COMPLETED
Start: 2020-09-21 | End: 2020-09-21

## 2020-09-21 RX ADMIN — OXYCODONE HYDROCHLORIDE 5 MG: 5 TABLET ORAL at 05:00

## 2020-09-21 RX ADMIN — OXYCODONE HYDROCHLORIDE 5 MG: 5 TABLET ORAL at 07:58

## 2020-09-21 RX ADMIN — AMLODIPINE BESYLATE 5 MG: 5 TABLET ORAL at 05:00

## 2020-09-21 RX ADMIN — RISPERIDONE 0.5 MG: 0.5 TABLET ORAL at 05:00

## 2020-09-21 RX ADMIN — RISPERIDONE 1 MG: 1 TABLET ORAL at 17:30

## 2020-09-21 RX ADMIN — DIVALPROEX SODIUM 125 MG: 125 CAPSULE ORAL at 05:00

## 2020-09-21 RX ADMIN — GABAPENTIN 100 MG: 100 CAPSULE ORAL at 12:08

## 2020-09-21 RX ADMIN — OXYCODONE HYDROCHLORIDE 5 MG: 5 TABLET ORAL at 17:30

## 2020-09-21 RX ADMIN — DIVALPROEX SODIUM 125 MG: 125 CAPSULE ORAL at 20:17

## 2020-09-21 RX ADMIN — GABAPENTIN 100 MG: 100 CAPSULE ORAL at 17:30

## 2020-09-21 RX ADMIN — SODIUM CHLORIDE: 9 INJECTION, SOLUTION INTRAVENOUS at 23:30

## 2020-09-21 RX ADMIN — OXYCODONE HYDROCHLORIDE 5 MG: 5 TABLET ORAL at 00:01

## 2020-09-21 RX ADMIN — OXYCODONE HYDROCHLORIDE 5 MG: 5 TABLET ORAL at 20:17

## 2020-09-21 RX ADMIN — Medication 400 MG: at 05:00

## 2020-09-21 RX ADMIN — Medication 400 MG: at 17:30

## 2020-09-21 RX ADMIN — DIVALPROEX SODIUM 125 MG: 125 CAPSULE ORAL at 13:21

## 2020-09-21 RX ADMIN — OXYCODONE HYDROCHLORIDE 5 MG: 5 TABLET ORAL at 14:47

## 2020-09-21 RX ADMIN — GABAPENTIN 100 MG: 100 CAPSULE ORAL at 05:00

## 2020-09-21 RX ADMIN — OXYCODONE HYDROCHLORIDE 5 MG: 5 TABLET ORAL at 23:04

## 2020-09-21 ASSESSMENT — PAIN DESCRIPTION - PAIN TYPE: TYPE: ACUTE PAIN

## 2020-09-21 NOTE — WOUND TEAM
[facility]  Inpatient Services   Wound and Skin Care Progress Note    Admission Date: 8/7/2020     Last order of IP CONSULT TO WOUND CARE was found on 9/1/2020 from Hospital Encounter on 8/7/2020     HPI, PMH, SH: Reviewed    Unit where seen by Wound Team: [id number]     WOUND CONSULT/FOLLOW UP RELATED TO:  Left leg scheduled NPWT change     Self Report / Pain Level:  Pre-medicated with PO oxy 5mg. 0/10 pain.        OBJECTIVE:  Patient sitting in bed with dressing intact. Very pleasant and willing for dressing change to be completed.     WOUND TYPE, LOCATION, CHARACTERISTICS (Pressure Injuries: location, stage, POA or date identified)         Negative Pressure Wound Therapy 09/03/20 Leg Lower;Medial;Posterior Left (Active)   NPWT Pump Mode / Pressure Setting Ulta;Continuous;125 mmHg 09/21/20 1200   Dressing Type Medium;Black Foam (Veraflo) 09/21/20 1200   Number of Foam Pieces Used 4 09/21/20 1200   Canister Changed No 09/21/20 1200   Output (mL) 450 mL 09/20/20 2023   NEXT Dressing Change/Treatment Date 09/23/20 09/21/20 1200   VAC VeraFlo Irrigant Normal Saline 09/21/20 1200   VAC VeraFlo Soak Time (mins) 6 09/21/20 1200   VAC VeraFlo Instill Volume (ml) 32 09/21/20 1200   VAC VeraFlo - Therapy Time (hrs) 2 09/21/20 1200   VAC VeraFlo Pressure (mm/Hg) Continuous;125 mmHg 09/21/20 1200   WOUND NURSE ONLY - Time Spent with Patient (mins) 60 09/21/20 1200           Wound 08/07/20 Full Thickness Wound Leg LLE posterior, extends medially and laterally (Active)   Wound Image     09/21/20 1200   Site Assessment Red;Fully granulated 09/21/20 1200   Periwound Assessment Clean;Dry;Intact;Scar tissue 09/21/20 1200   Margins Attached edges;Defined edges 09/21/20 1200   Closure Secondary intention 09/21/20 1200   Drainage Amount Moderate 09/21/20 1200   Drainage Description Serosanguineous 09/21/20 1200   Treatments Cleansed;CSWD - Conservative Sharp Wound Debridement;Irrigation;Site care 09/21/20 1200   Wound  Cleansing Approved Wound Cleanser 09/21/20 1200   Periwound Protectant Benzoin;Paste Ring;Drape 09/21/20 1200   Dressing Cleansing/Solutions Normal Saline 09/21/20 1200   Dressing Options Wound Vac 09/21/20 1200   Dressing Changed Changed 09/21/20 1200   Dressing Status Clean;Dry;Intact 09/21/20 1200   Dressing Change/Treatment Frequency By Wound Team Only 09/21/20 1200   NEXT Dressing Change/Treatment Date 09/23/20 09/21/20 1200   NEXT Weekly Photo (Inpatient Only) 09/28/20 09/21/20 1200   Non-staged Wound Description Full thickness 09/21/20 1200   Wound Length (cm) 33 cm 09/21/20 1200   Wound Width (cm) 13.3 cm 09/21/20 1200   Wound Depth (cm) 0.3 cm 09/21/20 1200   Wound Surface Area (cm^2) 438.9 cm^2 09/21/20 1200   Wound Volume (cm^3) 131.67 cm^3 09/21/20 1200   Post-Procedure Length (cm) 33 cm 09/21/20 1200   Post-Procedure Width (cm) 13.3 cm 09/21/20 1200   Post-Procedure Depth (cm) 0.3 cm 09/21/20 1200   Post-Procedure Surface Area (cm^2) 438.9 cm^2 09/21/20 1200   Post-Procedure Volume (cm^3) 131.67 cm^3 09/21/20 1200   Wound Healing % -150 09/21/20 1200   Wound Bed Granulation (%) 100 % 09/18/20 1600   Wound Bed Epithelium (%) 0 % 09/01/20 1300   Wound Bed Slough (%) 50 % 09/09/20 1300   Wound Bed Granulation (%) - Post-Procedure 0 % 09/01/20 1300   Wound Bed Epithelium % - (Post-Procedure) 0 % 09/01/20 1300   Wound Bed Slough % - (Post-Procedure) 0 % 09/01/20 1300   Wound Bed Eschar % - (Post-Procedure) 0 % 09/01/20 1300   Tunneling (cm) 0 cm 09/03/20 1300   Undermining (cm) 0 cm 09/03/20 1300   Shape wishbone 09/11/20 1210   Wound Odor Mild 09/21/20 1200   Pulses Left;2+;DP;PT 09/21/20 1200   Exposed Structures None 09/21/20 1200   WOUND NURSE ONLY - Time Spent with Patient (mins) 75 09/11/20 1210          Vascular:    KOSTAS:   KOSTAS Results, Last 30 Days Us-kostas Single Level Bilat    Result Date: 9/2/2020  Narrative  Vascular Laboratory  Conclusions  1.  Normal bilateral KOSTAS's.  GRUPO GANN  Age:    64     Gender:     M  MRN:    [id number]  :    1955      BSA:  Exam Date:     2020 09:59  Room #:     Inpatient  Priority:     Routine  Ht (in):             Wt (lb):  Ordering Physician:        EDDA CANADA  Referring Physician:       [id number]  Sonographer:               Deja Ellis RDMS                             RVT  Study Type:                Complete Bilateral  Technical Quality:         Adequate  Indications:     Ulceration of LE  CPT Codes:       53807  ICD Codes:       707.1  History:         Nonhealing wound of left lower extremity.  Limitations:                 RIGHT  Waveform            Systolic BPs (mmHg)                             117           Brachial  Triphasic                                Common Femoral  Triphasic                  138           Posterior Tibial  Triphasic                  132           Dorsalis Pedis                                           Peroneal                             1.18          KOSTAS                                           TBI                       LEFT  Waveform        Systolic BPs (mmHg)                             114           Brachial  Triphasic                                Common Femoral  Triphasic                  127           Posterior Tibial  Triphasic                  122           Dorsalis Pedis                                           Peroneal                             1.09          KOSTAS                                           TBI  Findings  Right.  Doppler waveforms of the common femoral artery are of high amplitude and  triphasic.  Doppler waveforms at the ankle are brisk and triphasic.  Ankle-brachial index is normal.  Left.  Doppler waveforms of the common femoral artery are of high amplitude and  triphasic.  Doppler waveforms at the ankle are brisk and triphasic.  Ankle-brachial index is normal.  Unable to obtained popliteal waveforms due to wound  bandages.  Additional testing was not performed in accordance with lower extremity  arterial evaluation protocol.  Clover VINCENT Rickylashae  (Electronically Signed)  Final Date:      02 September 2020                   11:14      Lab Values:    Lab Results   Component Value Date/Time    WBC 6.5 09/03/2020 11:31 AM    RBC 3.82 (L) 09/03/2020 11:31 AM    HEMOGLOBIN 11.2 (L) 09/03/2020 11:31 AM    HEMATOCRIT 35.0 (L) 09/03/2020 11:31 AM    CREACTPROT 1.07 (H) 08/12/2020 01:55 PM    SEDRATEWES 85 (H) 08/07/2020 01:20 PM    HBA1C 5.7 (H) 11/09/2018 06:30 PM        Culture Results show:  Recent Results (from the past 720 hour(s))   CULTURE WOUND W/ GRAM STAIN    Collection Time: 09/01/20  2:00 PM    Specimen: Left Leg; Wound   Result Value Ref Range    Significant Indicator POS (POS)     Source WND     Site LEFT LEG     Culture Result - (A)     Gram Stain Result       Moderate Gram positive cocci.  Moderate Gram positive rods.      Culture Result (A)      Beta Hemolytic Streptococcus group A  Moderate growth      Culture Result (A)      Methicillin Resistant Staphylococcus aureus  Moderate growth      Culture Result (A)      Diphtheroids  Moderate growth  Mixed morphologies.         Susceptibility    Methicillin resistant staphylococcus aureus - CATHERINE     Azithromycin >4 Resistant mcg/mL     Clindamycin <=0.5 Sensitive mcg/mL     Cefazolin <=8 Resistant mcg/mL     Ceftaroline <=0.5 Sensitive mcg/mL     Daptomycin <=1 Sensitive mcg/mL     Ampicillin/sulbactam 16/8 Resistant mcg/mL     Erythromycin >4 Resistant mcg/mL     Vancomycin 1 Sensitive mcg/mL     Oxacillin >2 Resistant mcg/mL     Penicillin >8 Resistant mcg/mL     Trimeth/Sulfa <=0.5/9.5 Sensitive mcg/mL     Tetracycline <=4 Sensitive mcg/mL       INTERVENTIONS BY WOUND TEAM: Chart reviewed.  Patient pre-medicated by bedside RN prior to VAC dressing removal.  Had patient take off pants and lay on his stomach for dressing change.  2 layer compression wrap removed and left LE  was washed with foam wash and warm washcloth, air dried.  Wound vac dressing removed and wound bed cleansed with wound cleanser and gauze.  Wound bed is fully granulated and some slough developed along the proximal edge of the wound.  CSWD with curette to remove ~56.0cm2 of adherent slough from wound bed.  Mild odor from wound bed, but can be due to veraflo dressings, no other signs of infection.  Cleansed again with wound cleanser.  Measurements and photo was taken.  Meredith-wound protected with benzoin and paste ring.  4 pieces of black veraflo foams pieced to wound bed and TRAC pad applied to proximal wound bed at thigh.  Resumed veraflo settings of 32mL of normal saline for 6 minutes every 2 hours.  No leaks noted. 2 layer compression wrap applied to left LE.       Interdisciplinary consultation: Patient, Bedside RN (Marissa)     EVALUATION / RATIONALE FOR TREATMENT:   9/18: wound granulating nicely and responding well to current treatment.    9/16: Wound bed with granular tissue, edges are thick but appear better than previous assessments. . NPWT VF to encourage closure by secondary intention and manage drainage while encouraging oxygenation and granulation to the wound bed. 2 layer compression to assist in decrease of swelling and encourage blood flow to wounds. Wound team to follow up and change 3x/week.      Goals: Steady decrease in wound area and depth weekly.    NURSING PLAN OF CARE ORDERS (X):    Dressing changes: See Dressing Care orders: X  Skin care: See Skin Care orders:   Rectal tube care: See Rectal Tube Care orders:   Other orders:      WOUND TEAM PLAN OF CARE:   Dressing changes by wound team:                   Follow up 3 times weekly:                NPWT change 3 times weekly:   X  Follow up 1-2 times weekly:      Follow up Bi-Monthly:                   Follow up as needed:       Other (explain):     Anticipated discharge plans: pending guardianship.   LTACH:        SNF/Rehab: X - patient will  need ongoing wound care for LLE upon discharge - 3x/week.                 Home Health Care:           Outpatient Wound Center:            Self Care:

## 2020-09-21 NOTE — CARE PLAN
Problem: Safety  Goal: Will remain free from falls  Outcome: PROGRESSING AS EXPECTED     Problem: Infection  Goal: Will remain free from infection  Outcome: PROGRESSING AS EXPECTED     Problem: Fluid Volume:  Goal: Will maintain balanced intake and output  Outcome: PROGRESSING AS EXPECTED     Problem: Pain Management  Goal: Pain level will decrease to patient's comfort goal  Outcome: PROGRESSING AS EXPECTED     Problem: Psychosocial Needs:  Goal: Level of anxiety will decrease  Outcome: PROGRESSING AS EXPECTED     Problem: Mobility  Goal: Risk for activity intolerance will decrease  Outcome: PROGRESSING AS EXPECTED     Problem: Respiratory:  Goal: Respiratory status will improve  Outcome: PROGRESSING AS EXPECTED

## 2020-09-22 PROCEDURE — 700102 HCHG RX REV CODE 250 W/ 637 OVERRIDE(OP): Performed by: NURSE PRACTITIONER

## 2020-09-22 PROCEDURE — A9270 NON-COVERED ITEM OR SERVICE: HCPCS | Performed by: NURSE PRACTITIONER

## 2020-09-22 PROCEDURE — 770021 HCHG ROOM/CARE - ISO PRIVATE

## 2020-09-22 RX ADMIN — OXYCODONE HYDROCHLORIDE 5 MG: 5 TABLET ORAL at 05:29

## 2020-09-22 RX ADMIN — GABAPENTIN 100 MG: 100 CAPSULE ORAL at 05:29

## 2020-09-22 RX ADMIN — GABAPENTIN 100 MG: 100 CAPSULE ORAL at 17:33

## 2020-09-22 RX ADMIN — Medication 400 MG: at 17:33

## 2020-09-22 RX ADMIN — RISPERIDONE 1 MG: 1 TABLET ORAL at 17:33

## 2020-09-22 RX ADMIN — OXYCODONE HYDROCHLORIDE 5 MG: 5 TABLET ORAL at 18:32

## 2020-09-22 RX ADMIN — GABAPENTIN 100 MG: 100 CAPSULE ORAL at 12:32

## 2020-09-22 RX ADMIN — OXYCODONE HYDROCHLORIDE 5 MG: 5 TABLET ORAL at 14:21

## 2020-09-22 RX ADMIN — OXYCODONE HYDROCHLORIDE 5 MG: 5 TABLET ORAL at 09:58

## 2020-09-22 RX ADMIN — DIVALPROEX SODIUM 125 MG: 125 CAPSULE ORAL at 05:29

## 2020-09-22 RX ADMIN — Medication 400 MG: at 05:28

## 2020-09-22 RX ADMIN — DIVALPROEX SODIUM 125 MG: 125 CAPSULE ORAL at 22:14

## 2020-09-22 RX ADMIN — RISPERIDONE 0.5 MG: 0.5 TABLET ORAL at 05:28

## 2020-09-22 RX ADMIN — AMLODIPINE BESYLATE 5 MG: 5 TABLET ORAL at 05:28

## 2020-09-22 RX ADMIN — DIVALPROEX SODIUM 125 MG: 125 CAPSULE ORAL at 14:21

## 2020-09-22 ASSESSMENT — PAIN DESCRIPTION - PAIN TYPE
TYPE: ACUTE PAIN
TYPE: ACUTE PAIN

## 2020-09-22 ASSESSMENT — PAIN SCALES - WONG BAKER: WONGBAKER_NUMERICALRESPONSE: HURTS JUST A LITTLE BIT

## 2020-09-22 NOTE — CARE PLAN
Problem: Safety  Goal: Will remain free from falls  Outcome: PROGRESSING AS EXPECTED       Problem: Mobility  Goal: Risk for activity intolerance will decrease  Outcome: PROGRESSING AS EXPECTED

## 2020-09-22 NOTE — CARE PLAN
Problem: Safety  Goal: Will remain free from falls  Outcome: PROGRESSING AS EXPECTED     Problem: Infection  Goal: Will remain free from infection  Outcome: PROGRESSING AS EXPECTED  Intervention: Assess signs and symptoms of infection  Note: Monitor for s/s of infection, notify MD for changes from baseline     Problem: Knowledge Deficit  Goal: Knowledge of disease process/condition, treatment plan, diagnostic tests, and medications will improve  Outcome: PROGRESSING AS EXPECTED  Intervention: Assess knowledge level of disease process/condition, treatment plan, diagnostic tests, and medications  Note: Assess knowledge and reinforce education     Problem: Pain Management  Goal: Pain level will decrease to patient's comfort goal  Outcome: PROGRESSING AS EXPECTED

## 2020-09-23 PROCEDURE — 700102 HCHG RX REV CODE 250 W/ 637 OVERRIDE(OP): Performed by: NURSE PRACTITIONER

## 2020-09-23 PROCEDURE — A9270 NON-COVERED ITEM OR SERVICE: HCPCS | Performed by: NURSE PRACTITIONER

## 2020-09-23 PROCEDURE — 700105 HCHG RX REV CODE 258

## 2020-09-23 PROCEDURE — 770021 HCHG ROOM/CARE - ISO PRIVATE

## 2020-09-23 PROCEDURE — 99231 SBSQ HOSP IP/OBS SF/LOW 25: CPT | Performed by: NURSE PRACTITIONER

## 2020-09-23 PROCEDURE — 302098 PASTE RING (FLAT): Performed by: NURSE PRACTITIONER

## 2020-09-23 RX ORDER — SODIUM CHLORIDE 9 MG/ML
INJECTION, SOLUTION INTRAVENOUS
Status: COMPLETED
Start: 2020-09-23 | End: 2020-09-23

## 2020-09-23 RX ADMIN — Medication 400 MG: at 16:20

## 2020-09-23 RX ADMIN — OXYCODONE HYDROCHLORIDE 5 MG: 5 TABLET ORAL at 13:18

## 2020-09-23 RX ADMIN — DIVALPROEX SODIUM 125 MG: 125 CAPSULE ORAL at 13:17

## 2020-09-23 RX ADMIN — GABAPENTIN 100 MG: 100 CAPSULE ORAL at 06:07

## 2020-09-23 RX ADMIN — DIVALPROEX SODIUM 125 MG: 125 CAPSULE ORAL at 06:07

## 2020-09-23 RX ADMIN — Medication 400 MG: at 06:06

## 2020-09-23 RX ADMIN — GABAPENTIN 100 MG: 100 CAPSULE ORAL at 16:20

## 2020-09-23 RX ADMIN — GABAPENTIN 100 MG: 100 CAPSULE ORAL at 13:18

## 2020-09-23 RX ADMIN — RISPERIDONE 0.5 MG: 0.5 TABLET ORAL at 06:07

## 2020-09-23 RX ADMIN — OXYCODONE HYDROCHLORIDE 5 MG: 5 TABLET ORAL at 21:29

## 2020-09-23 RX ADMIN — RISPERIDONE 1 MG: 1 TABLET ORAL at 16:20

## 2020-09-23 RX ADMIN — DIVALPROEX SODIUM 125 MG: 125 CAPSULE ORAL at 21:25

## 2020-09-23 RX ADMIN — AMLODIPINE BESYLATE 5 MG: 5 TABLET ORAL at 06:06

## 2020-09-23 RX ADMIN — OXYCODONE HYDROCHLORIDE 5 MG: 5 TABLET ORAL at 16:20

## 2020-09-23 RX ADMIN — SODIUM CHLORIDE 1000 ML: 9 INJECTION, SOLUTION INTRAVENOUS at 21:26

## 2020-09-23 ASSESSMENT — PAIN DESCRIPTION - PAIN TYPE
TYPE: CHRONIC PAIN

## 2020-09-23 ASSESSMENT — ENCOUNTER SYMPTOMS: MYALGIAS: 1

## 2020-09-23 NOTE — CARE PLAN
Problem: Pain Management  Goal: Pain level will decrease to patient's comfort goal  Outcome: PROGRESSING AS EXPECTED  Comfortable, pain managed with standing and prn medication.      Problem: Respiratory:  Goal: Respiratory status will improve  Outcome: PROGRESSING AS EXPECTED  Comfortable on room air, uses IS appropriately

## 2020-09-23 NOTE — DISCHARGE PLANNING
Anticipated Discharge Disposition: Group Home    Action: guardianship pending    Barriers to Discharge: nayla, group home acceptance    Plan: f/u with medical team

## 2020-09-23 NOTE — WOUND TEAM
[facility]  Inpatient Services   Wound and Skin Care Progress Note    Admission Date: 8/7/2020     Last order of IP CONSULT TO WOUND CARE was found on 9/1/2020 from Hospital Encounter on 8/7/2020     HPI, PMH, SH: Reviewed    Unit where seen by Wound Team: [id number]/00     WOUND CONSULT/FOLLOW UP RELATED TO:  Left leg scheduled NPWT change     Self Report / Pain Level:  Pre-medicated with PO oxy 5mg. 0/10 pain.        OBJECTIVE:  Patient sitting in bed with dressing intact. Very pleasant and willing for dressing change to be completed.     WOUND TYPE, LOCATION, CHARACTERISTICS (Pressure Injuries: location, stage, POA or date identified)     Negative Pressure Wound Therapy 09/03/20 Leg Lower;Medial;Posterior Left (Active)   NPWT Pump Mode / Pressure Setting Ulta;Continuous;125 mmHg 09/23/20 1430   Dressing Type Medium;Black Foam (Veraflo) 09/23/20 1430   Number of Foam Pieces Used 3 09/23/20 1430   Canister Changed No 09/23/20 1430   Output (mL) 300 mL 09/21/20 2300   NEXT Dressing Change/Treatment Date 09/25/20 09/23/20 1430   VAC VeraFlo Irrigant Normal Saline 09/23/20 1430   VAC VeraFlo Soak Time (mins) 6 09/23/20 1430   VAC VeraFlo Instill Volume (ml) 32 09/23/20 1430   VAC VeraFlo - Therapy Time (hrs) 2 09/23/20 1430   VAC VeraFlo Pressure (mm/Hg) Continuous;125 mmHg 09/23/20 1430   WOUND NURSE ONLY - Time Spent with Patient (mins) 75 09/23/20 1430           Wound 08/07/20 Full Thickness Wound Leg LLE posterior, extends medially and laterally (Active)   Wound Image     09/21/20 1200   Site Assessment Red;Pink;Slough 09/23/20 1430   Periwound Assessment Clean;Dry;Intact;Scar tissue 09/23/20 1430   Margins Attached edges;Defined edges 09/23/20 1430   Closure Secondary intention 09/23/20 1430   Drainage Amount Moderate 09/23/20 1430   Drainage Description Serous;Serosanguineous 09/23/20 1430   Treatments Cleansed;CSWD - Conservative Sharp Wound Debridement;Irrigation;Site care 09/23/20 1430   Wound  Cleansing Approved Wound Cleanser 09/23/20 1430   Periwound Protectant Benzoin;Paste Ring;Drape 09/23/20 1430   Dressing Cleansing/Solutions Normal Saline 09/23/20 1430   Dressing Options Wound Vac 09/23/20 1430   Dressing Changed Changed 09/23/20 1430   Dressing Status Clean;Dry;Intact 09/23/20 1430   Dressing Change/Treatment Frequency By Wound Team Only 09/23/20 1430   NEXT Dressing Change/Treatment Date 09/25/20 09/23/20 1430   NEXT Weekly Photo (Inpatient Only) 09/28/20 09/21/20 1200   Non-staged Wound Description Full thickness 09/23/20 1430   Wound Length (cm) 33 cm 09/21/20 1200   Wound Width (cm) 13.3 cm 09/21/20 1200   Wound Depth (cm) 0.3 cm 09/21/20 1200   Wound Surface Area (cm^2) 438.9 cm^2 09/21/20 1200   Wound Volume (cm^3) 131.67 cm^3 09/21/20 1200   Post-Procedure Length (cm) 33 cm 09/21/20 1200   Post-Procedure Width (cm) 13.3 cm 09/21/20 1200   Post-Procedure Depth (cm) 0.3 cm 09/21/20 1200   Post-Procedure Surface Area (cm^2) 438.9 cm^2 09/21/20 1200   Post-Procedure Volume (cm^3) 131.67 cm^3 09/21/20 1200   Wound Healing % -150 09/21/20 1200   Wound Bed Granulation (%) 100 % 09/18/20 1600   Wound Bed Epithelium (%) 0 % 09/01/20 1300   Wound Bed Slough (%) 50 % 09/09/20 1300   Wound Bed Granulation (%) - Post-Procedure 0 % 09/01/20 1300   Wound Bed Epithelium % - (Post-Procedure) 0 % 09/01/20 1300   Wound Bed Slough % - (Post-Procedure) 0 % 09/01/20 1300   Wound Bed Eschar % - (Post-Procedure) 0 % 09/01/20 1300   Tunneling (cm) 0 cm 09/03/20 1300   Undermining (cm) 0 cm 09/03/20 1300   Shape wishbone 09/11/20 1210   Wound Odor Mild 09/23/20 1430   Pulses Left;2+;DP;PT 09/23/20 1430   Exposed Structures None 09/23/20 1430   WOUND NURSE ONLY - Time Spent with Patient (mins) 75 09/23/20 1430             Vascular:    KOSTAS:   KOSTAS Results, Last 30 Days Us-kostas Single Level Bilat    Result Date: 9/2/2020  Narrative  Vascular Laboratory  Conclusions  1.  Normal bilateral KOSTAS's.  GRUPO GANN  Age:    64     Gender:     M  MRN:    [id number]  :    1955      BSA:  Exam Date:     2020 09:59  Room #:     Inpatient  Priority:     Routine  Ht (in):             Wt (lb):  Ordering Physician:        EDDA CANADA  Referring Physician:       [id number]  Sonographer:               Deja Ellis RDMS                             RVT  Study Type:                Complete Bilateral  Technical Quality:         Adequate  Indications:     Ulceration of LE  CPT Codes:       92645  ICD Codes:       707.1  History:         Nonhealing wound of left lower extremity.  Limitations:                 RIGHT  Waveform            Systolic BPs (mmHg)                             117           Brachial  Triphasic                                Common Femoral  Triphasic                  138           Posterior Tibial  Triphasic                  132           Dorsalis Pedis                                           Peroneal                             1.18          KOSTAS                                           TBI                       LEFT  Waveform        Systolic BPs (mmHg)                             114           Brachial  Triphasic                                Common Femoral  Triphasic                  127           Posterior Tibial  Triphasic                  122           Dorsalis Pedis                                           Peroneal                             1.09          KOSTAS                                           TBI  Findings  Right.  Doppler waveforms of the common femoral artery are of high amplitude and  triphasic.  Doppler waveforms at the ankle are brisk and triphasic.  Ankle-brachial index is normal.  Left.  Doppler waveforms of the common femoral artery are of high amplitude and  triphasic.  Doppler waveforms at the ankle are brisk and triphasic.  Ankle-brachial index is normal.  Unable to obtained popliteal waveforms due to wound  bandages.  Additional testing was not performed in accordance with lower extremity  arterial evaluation protocol.  Clover VINCENT Rickylashae  (Electronically Signed)  Final Date:      02 September 2020                   11:14      Lab Values:    Lab Results   Component Value Date/Time    WBC 6.5 09/03/2020 11:31 AM    RBC 3.82 (L) 09/03/2020 11:31 AM    HEMOGLOBIN 11.2 (L) 09/03/2020 11:31 AM    HEMATOCRIT 35.0 (L) 09/03/2020 11:31 AM    CREACTPROT 1.07 (H) 08/12/2020 01:55 PM    SEDRATEWES 85 (H) 08/07/2020 01:20 PM    HBA1C 5.7 (H) 11/09/2018 06:30 PM        Culture Results show:  Recent Results (from the past 720 hour(s))   CULTURE WOUND W/ GRAM STAIN    Collection Time: 09/01/20  2:00 PM    Specimen: Left Leg; Wound   Result Value Ref Range    Significant Indicator POS (POS)     Source WND     Site LEFT LEG     Culture Result - (A)     Gram Stain Result       Moderate Gram positive cocci.  Moderate Gram positive rods.      Culture Result (A)      Beta Hemolytic Streptococcus group A  Moderate growth      Culture Result (A)      Methicillin Resistant Staphylococcus aureus  Moderate growth      Culture Result (A)      Diphtheroids  Moderate growth  Mixed morphologies.         Susceptibility    Methicillin resistant staphylococcus aureus - CATHERINE     Azithromycin >4 Resistant mcg/mL     Clindamycin <=0.5 Sensitive mcg/mL     Cefazolin <=8 Resistant mcg/mL     Ceftaroline <=0.5 Sensitive mcg/mL     Daptomycin <=1 Sensitive mcg/mL     Ampicillin/sulbactam 16/8 Resistant mcg/mL     Erythromycin >4 Resistant mcg/mL     Vancomycin 1 Sensitive mcg/mL     Oxacillin >2 Resistant mcg/mL     Penicillin >8 Resistant mcg/mL     Trimeth/Sulfa <=0.5/9.5 Sensitive mcg/mL     Tetracycline <=4 Sensitive mcg/mL       INTERVENTIONS BY WOUND TEAM: Chart reviewed.  Patient pre-medicated by bedside RN prior to VAC dressing removal.  Had patient take off pants and lay on his stomach for dressing change.  2 layer compression wrap removed and left LE  was washed with foam wash and warm washcloth, air dried.  Wound vac dressing removed and wound bed cleansed with wound cleanser and gauze.  Wound bed is fully granulated and some slough developed along the proximal edge of the wound.  CSWD with curette to remove ~56.0cm2 of adherent slough from wound bed.  Mild odor from wound bed, but can be due to veraflo dressings, no other signs of infection.  Cleansed again with wound cleanser.  Measurements and photo was taken.  Meredith-wound protected with benzoin and paste ring.  4 pieces of black veraflo foams pieced to wound bed and TRAC pad applied to proximal wound bed at thigh.  Resumed veraflo settings of 32mL of normal saline for 6 minutes every 2 hours.  No leaks noted. 2 layer compression wrap applied to left LE.       Interdisciplinary consultation: Patient, Bedside RN (Louie)     EVALUATION / RATIONALE FOR TREATMENT:   9/23: Patient still awaiting guardianship for placement.   9/18: wound granulating nicely and responding well to current treatment.    9/16: Wound bed with granular tissue, edges are thick but appear better than previous assessments. . NPWT VF to encourage closure by secondary intention and manage drainage while encouraging oxygenation and granulation to the wound bed. 2 layer compression to assist in decrease of swelling and encourage blood flow to wounds. Wound team to follow up and change 3x/week.      Goals: Steady decrease in wound area and depth weekly.    NURSING PLAN OF CARE ORDERS (X):    Dressing changes: See Dressing Care orders: X  Skin care: See Skin Care orders:   Rectal tube care: See Rectal Tube Care orders:   Other orders:      WOUND TEAM PLAN OF CARE:   Dressing changes by wound team:                   Follow up 3 times weekly:                NPWT change 3 times weekly:   X  Follow up 1-2 times weekly:      Follow up Bi-Monthly:                   Follow up as needed:       Other (explain):     Anticipated discharge plans: pending  guardianship.   LTACH:        SNF/Rehab: X - patient will need ongoing wound care for LLE upon discharge - 3x/week.                 Home Health Care:           Outpatient Wound Center:            Self Care:

## 2020-09-23 NOTE — PROGRESS NOTES
[facility] Medicine  Weekly Progress Note    Date of Service  9/23/2020    Chief Complaint  LLE wound    Hospital Course   Mr. Varela is a 64-year-old male with PMH significant for homelessness, etoh use, tobacco dependence and chronic LLE wound who presented 8/7/2020 with LLE wound infection.  He was hospitalized at our facility in April and evaluated by orthopedic surgery who recommended wound care.  Wound cultures at that time grew MSSA and strep.  Patient reported losing his Medicaid card and having no transportation to wound clinic.  He was seen at [facility] earlier this week and prescribed ABX, however he has not filled the prescription.  US LLE negative for DVT.  Treated for sepsis with empiric ABX and wound care.  He was found to have head lice and underwent treatments. Continued to have intermittent agitation so was started on risperdol, depakote and gabapentin per psych recs.  He has been deemed incapacitated to make medical decisions and is currently pending guardianship and placement.  He is medically stable and will be only be evaluated 2 times weekly unless there is a clinical change.          Interval Problem Update  9/23- Patient resting in bed, has been utilizing pain medications. Wound vac to left lower extremity. LPS continuing to see patient for continued wound care. Patient will likely need wound vac on discharge. Sitter at doorway.     Consultants/Specialty  Psychiatry    Code Status  Full Code    Disposition  Homeless. Pursuing guardianship and placement      Review of Systems   Unable to perform ROS: Mental status change   Musculoskeletal: Positive for myalgias.     Physical Exam   Constitutional: Vital signs are normal. He appears unhealthy.   HENT:   Head: Normocephalic.   Eyes: Lids are normal.   Neck: Neck supple.   Cardiovascular: Normal rate.   Pulmonary/Chest: Effort normal. No respiratory distress.   Musculoskeletal:      Comments: YAJAIRA   Neurological:   Sleepy this am     Skin:   Wound vac and wrappings to left lower extremity    Psychiatric: His affect is labile. He expresses impulsivity. He exhibits abnormal recent memory. He has a flat affect.   Nursing note and vitals reviewed.      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   -blood cultures (-) to date  -afebrile. Leukocytosis resolved  -LPS following for serial debridements; no need for Vac at dc  -Cx 9/1 Strep, MRSA, diptheroids: completed Zyvox 9/2 thru 9/16    Encephalopathy- (present on admission)  Assessment & Plan  -acute on chronic, likely due Wernicke's   -Psychiatry: incapacitated to make medical decision or leave AMA   -Requires guardianship and placement  -Avoid narcotics and hypnotics.  Cautious use of benzodiazepines for alcohol withdrawal protocol  -Frequent reorientation  -Sleep hygiene    Non-compliance  Assessment & Plan  -likely also component of psychiatric disorder and ETOH abuse  -Psychiatric consulted and suggests patient is incapacitated to make medical decisions or leave AMA  -ongoing encouragement for compliance.    Psychiatric disorder  Assessment & Plan  -unknown/unspecified  -psychiatry consulted for agitation - incapacitated to leave AMA or make medical decisions, appreciate recs: risperdol 0.5 -1mg BID, cont depakote 125mg TID (8/15/20, watch LFTs), added neurotin 100mg TID 8/16  -Pending guardianship. Application submitted 9/3.    Flexion contracture of knee, 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)  Assessment & Plan  -secondary to chronic wound in that area  -PT OT  -encourage mobility    Emphysema/COPD (HCC)  Assessment & Plan  Stable. RA. Chronic w/o exacerbation    Head lice  Assessment & Plan  Head lice found  S/p first round of treatment 8/10, treated again on 8/13 given active lice seen by CNA, will need second treatment 8/20. Given 3rd  treatment.  If still having issues will likely need to shave patient    Alcohol dependence (HCC)- (present on admission)  Assessment & Plan  -history of. Cont MV    Protein malnutrition (HCC)  Assessment & Plan  -history of ETOH and homelessness  -dietary consult  -TID supplements  -encourage PO intake    Tobacco dependence- (present on admission)  Assessment & Plan  -Nicotine replacement protocol       VTE prophylaxis: Enoxaparin    Charo Connor A.P.RRachaelNRachael    I have performed a physical exam and reviewed and updated ROS and Plan today 9/23/2020. In review of previous there are no changes except as documented above    I certify that the patient requires continued medically necessary hospital services for the treatment of psychosis with neurocognitive disorder. The patient will remain in the hospital for the foreseeable future.  Discharge may or may not occur in the next 20 days due to ongoing discharge delays due to no medically acceptable discharge option.

## 2020-09-24 PROCEDURE — 770021 HCHG ROOM/CARE - ISO PRIVATE

## 2020-09-24 PROCEDURE — A9270 NON-COVERED ITEM OR SERVICE: HCPCS | Performed by: NURSE PRACTITIONER

## 2020-09-24 PROCEDURE — 700102 HCHG RX REV CODE 250 W/ 637 OVERRIDE(OP): Performed by: NURSE PRACTITIONER

## 2020-09-24 RX ADMIN — OXYCODONE HYDROCHLORIDE 5 MG: 5 TABLET ORAL at 16:46

## 2020-09-24 RX ADMIN — OXYCODONE HYDROCHLORIDE 5 MG: 5 TABLET ORAL at 06:08

## 2020-09-24 RX ADMIN — GABAPENTIN 100 MG: 100 CAPSULE ORAL at 06:09

## 2020-09-24 RX ADMIN — AMLODIPINE BESYLATE 5 MG: 5 TABLET ORAL at 06:09

## 2020-09-24 RX ADMIN — RISPERIDONE 1 MG: 1 TABLET ORAL at 16:46

## 2020-09-24 RX ADMIN — OXYCODONE HYDROCHLORIDE 5 MG: 5 TABLET ORAL at 09:43

## 2020-09-24 RX ADMIN — RISPERIDONE 0.5 MG: 0.5 TABLET ORAL at 06:08

## 2020-09-24 RX ADMIN — DIVALPROEX SODIUM 125 MG: 125 CAPSULE ORAL at 06:07

## 2020-09-24 RX ADMIN — GABAPENTIN 100 MG: 100 CAPSULE ORAL at 13:30

## 2020-09-24 RX ADMIN — DIVALPROEX SODIUM 125 MG: 125 CAPSULE ORAL at 20:22

## 2020-09-24 RX ADMIN — OXYCODONE HYDROCHLORIDE 5 MG: 5 TABLET ORAL at 20:22

## 2020-09-24 RX ADMIN — DIVALPROEX SODIUM 125 MG: 125 CAPSULE ORAL at 13:30

## 2020-09-24 RX ADMIN — OXYCODONE HYDROCHLORIDE 5 MG: 5 TABLET ORAL at 13:30

## 2020-09-24 RX ADMIN — GABAPENTIN 100 MG: 100 CAPSULE ORAL at 16:46

## 2020-09-24 RX ADMIN — OXYCODONE HYDROCHLORIDE 5 MG: 5 TABLET ORAL at 00:48

## 2020-09-24 RX ADMIN — Medication 400 MG: at 06:09

## 2020-09-24 RX ADMIN — Medication 400 MG: at 16:46

## 2020-09-24 ASSESSMENT — PAIN DESCRIPTION - PAIN TYPE
TYPE: CHRONIC PAIN

## 2020-09-24 NOTE — CARE PLAN
Problem: Infection  Goal: Will remain free from infection  Outcome: PROGRESSING AS EXPECTED  Afebrile, surgical dressing intact, has wound vac in place with Veraflow.      Problem: Pain Management  Goal: Pain level will decrease to patient's comfort goal  Outcome: PROGRESSING AS EXPECTED  Pain increased due to dressing change yesterday however has relief with oral medication.

## 2020-09-25 PROCEDURE — A9270 NON-COVERED ITEM OR SERVICE: HCPCS | Performed by: NURSE PRACTITIONER

## 2020-09-25 PROCEDURE — 770021 HCHG ROOM/CARE - ISO PRIVATE

## 2020-09-25 PROCEDURE — 97606 NEG PRS WND THER DME>50 SQCM: CPT

## 2020-09-25 PROCEDURE — 700102 HCHG RX REV CODE 250 W/ 637 OVERRIDE(OP): Performed by: NURSE PRACTITIONER

## 2020-09-25 PROCEDURE — 302098 PASTE RING (FLAT): Performed by: NURSE PRACTITIONER

## 2020-09-25 RX ADMIN — GABAPENTIN 100 MG: 100 CAPSULE ORAL at 16:56

## 2020-09-25 RX ADMIN — DIVALPROEX SODIUM 125 MG: 125 CAPSULE ORAL at 13:23

## 2020-09-25 RX ADMIN — DIVALPROEX SODIUM 125 MG: 125 CAPSULE ORAL at 20:27

## 2020-09-25 RX ADMIN — OXYCODONE HYDROCHLORIDE 5 MG: 5 TABLET ORAL at 16:56

## 2020-09-25 RX ADMIN — RISPERIDONE 0.5 MG: 0.5 TABLET ORAL at 04:15

## 2020-09-25 RX ADMIN — DIVALPROEX SODIUM 125 MG: 125 CAPSULE ORAL at 04:15

## 2020-09-25 RX ADMIN — OXYCODONE HYDROCHLORIDE 5 MG: 5 TABLET ORAL at 13:24

## 2020-09-25 RX ADMIN — GABAPENTIN 100 MG: 100 CAPSULE ORAL at 04:15

## 2020-09-25 RX ADMIN — OXYCODONE HYDROCHLORIDE 5 MG: 5 TABLET ORAL at 00:25

## 2020-09-25 RX ADMIN — OXYCODONE HYDROCHLORIDE 5 MG: 5 TABLET ORAL at 10:19

## 2020-09-25 RX ADMIN — Medication 400 MG: at 04:15

## 2020-09-25 RX ADMIN — RISPERIDONE 1 MG: 1 TABLET ORAL at 16:56

## 2020-09-25 RX ADMIN — GABAPENTIN 100 MG: 100 CAPSULE ORAL at 13:24

## 2020-09-25 RX ADMIN — OXYCODONE HYDROCHLORIDE 5 MG: 5 TABLET ORAL at 20:27

## 2020-09-25 RX ADMIN — Medication 400 MG: at 16:56

## 2020-09-25 RX ADMIN — OXYCODONE HYDROCHLORIDE 5 MG: 5 TABLET ORAL at 04:14

## 2020-09-25 RX ADMIN — AMLODIPINE BESYLATE 5 MG: 5 TABLET ORAL at 04:15

## 2020-09-25 ASSESSMENT — PAIN DESCRIPTION - PAIN TYPE
TYPE: ACUTE PAIN
TYPE: ACUTE PAIN
TYPE: CHRONIC PAIN;ACUTE PAIN
TYPE: ACUTE PAIN
TYPE: ACUTE PAIN

## 2020-09-25 NOTE — WOUND TEAM
[facility]  Inpatient Services   Wound and Skin Care Progress Note    Admission Date: 8/7/2020     Last order of IP CONSULT TO WOUND CARE was found on 9/1/2020 from Hospital Encounter on 8/7/2020     HPI, PMH, SH: Reviewed    Unit where seen by Wound Team: T309/00     WOUND CONSULT/FOLLOW UP RELATED TO:  Left leg scheduled NPWT change     Self Report / Pain Level:  Pre-medicated with PO oxy 5mg. 0/10 pain.        OBJECTIVE:  Patient sitting in bed with dressing intact.    WOUND TYPE, LOCATION, CHARACTERISTICS (Pressure Injuries: location, stage, POA or date identified)     Negative Pressure Wound Therapy 09/03/20 Leg Lower;Medial;Posterior Left (Active)   NPWT Pump Mode / Pressure Setting Intermittent;125 mmHg 09/25/20 1600   Dressing Type Medium;Black Foam (Veraflo)    Number of Foam Pieces Used 4    Canister Changed No    NEXT Dressing Change/Treatment Date 09/28/20    VAC VeraFlo Irrigant Normal Saline    VAC VeraFlo Soak Time (mins) 6    VAC VeraFlo Instill Volume (ml) 24    VAC VeraFlo - Therapy Time (hrs) 1    VAC VeraFlo Pressure (mm/Hg) Intermittent;125 mmHg         Wound 08/07/20 Full Thickness Wound Leg LLE posterior, extends medially and laterally (Active)   Wound Image     09/21/20   Site Assessment Pale;Pink;Early/partial granulation 09/25/20    Periwound Assessment Intact    Margins Attached edges;Defined edges    Closure Secondary intention    Drainage Amount Scant    Drainage Description Serosanguineous    Treatments Cleansed;Site care    Wound Cleansing Approved Wound Cleanser    Periwound Protectant Benzoin;Paste Ring;Drape    Dressing Cleansing/Solutions Normal Saline    Dressing Options Wound Vac    Dressing Changed Changed    Dressing Status Clean;Dry;Intact    Dressing Change/Treatment Frequency Monday, Wednesday, Friday, and As Needed    NEXT Dressing Change/Treatment Date 09/28/20    NEXT Weekly Photo (Inpatient Only) 09/30/20    Non-staged Wound Description Full thickness     Wound Length (cm) 33 cm 20    Wound Width (cm) 13.3 cm    Wound Depth (cm) 0.3 cm    Wound Surface Area (cm^2) 438.9 cm^2    Wound Volume (cm^3) 131.67 cm^3    Post-Procedure Length (cm) 33 cm    Post-Procedure Width (cm) 13.3 cm    Post-Procedure Depth (cm) 0.3 cm    Post-Procedure Surface Area (cm^2) 438.9 cm^2    Post-Procedure Volume (cm^3) 131.67 cm^3    Wound Healing % -150    Shape Irregular 20   Wound Odor None    Pulses Left;2+;DP;PT    Exposed Structures None        Vascular:    CESAR:   CESAR Results, Last 30 Days Us-cesar Single Level Bilat    Result Date: 2020  Narrative  Vascular Laboratory  Conclusions  1.  Normal bilateral CESAR's.  GRUPO GANN  Age:    64    Gender:     M  MRN:    [id number]  :    1955      BSA:  Exam Date:     2020 09:59  Room #:     Inpatient  Priority:     Routine  Ht (in):             Wt (lb):  Ordering Physician:        EDDA CANADA  Referring Physician:       [id number]  Sonographer:               Deja Ellis RDMS                             RVT  Study Type:                Complete Bilateral  Technical Quality:         Adequate  Indications:     Ulceration of LE  CPT Codes:       34556  ICD Codes:       707.1  History:         Nonhealing wound of left lower extremity.  Limitations:                 RIGHT  Waveform            Systolic BPs (mmHg)                             117           Brachial  Triphasic                                Common Femoral  Triphasic                  138           Posterior Tibial  Triphasic                  132           Dorsalis Pedis                                           Peroneal                             1.18          CESAR                                           TBI                       LEFT  Waveform        Systolic BPs (mmHg)                             114           Brachial  Triphasic                                Common  Femoral  Triphasic                  127           Posterior Tibial  Triphasic                  122           Dorsalis Pedis                                           Peroneal                             1.09          KOSTAS                                           TBI  Findings  Right.  Doppler waveforms of the common femoral artery are of high amplitude and  triphasic.  Doppler waveforms at the ankle are brisk and triphasic.  Ankle-brachial index is normal.  Left.  Doppler waveforms of the common femoral artery are of high amplitude and  triphasic.  Doppler waveforms at the ankle are brisk and triphasic.  Ankle-brachial index is normal.  Unable to obtained popliteal waveforms due to wound bandages.  Additional testing was not performed in accordance with lower extremity  arterial evaluation protocol.  Clover Maya  (Electronically Signed)  Final Date:      02 September 2020                   11:14    Lab Values:    Lab Results   Component Value Date/Time    WBC 6.5 09/03/2020 11:31 AM    RBC 3.82 (L) 09/03/2020 11:31 AM    HEMOGLOBIN 11.2 (L) 09/03/2020 11:31 AM    HEMATOCRIT 35.0 (L) 09/03/2020 11:31 AM    CREACTPROT 1.07 (H) 08/12/2020 01:55 PM    SEDRATEWES 85 (H) 08/07/2020 01:20 PM    HBA1C 5.7 (H) 11/09/2018 06:30 PM        Culture Results show:  Recent Results (from the past 720 hour(s))   CULTURE WOUND W/ GRAM STAIN    Collection Time: 09/01/20  2:00 PM    Specimen: Left Leg; Wound   Result Value Ref Range    Significant Indicator POS (POS)     Source WND     Site LEFT LEG     Culture Result - (A)     Gram Stain Result       Moderate Gram positive cocci.  Moderate Gram positive rods.      Culture Result (A)      Beta Hemolytic Streptococcus group A  Moderate growth      Culture Result (A)      Methicillin Resistant Staphylococcus aureus  Moderate growth      Culture Result (A)      Diphtheroids  Moderate growth  Mixed morphologies.         INTERVENTIONS BY WOUND TEAM:  Patient pre-medicated by bedside RN  prior to VAC dressing removal.  Had patient take off pants and lay on his stomach for dressing change.  2 layer compression wrap removed and left LE was washed with foam wash and warm washcloth, air dried.  Wound vac dressing removed and wound bed cleansed with wound cleanser and gauze.  Meredith-wound prepped with no sting and paste ring.  Black veraflo foam to wound bed, sealed with drape and TRAC pad + button applied to proximal wound bed at thigh.  Resumed Veraflo, decreased instillation to 24 ml r/t decreased wound depth, soak maintained at 6 minutes with frequency of hourly.  No leaks noted. 2 layer compression wrap applied to left LE. New pants provided.    Interdisciplinary consultation: Patient, Bedside RN      EVALUATION / RATIONALE FOR TREATMENT:    9/25: Odor noted, though unclear if from wound or pt, consider shower before next Vac change. Consider regular vac next change, wound granular and superficial.   9/23: Patient still awaiting guardianship for placement.   9/18: wound granulating nicely and responding well to current treatment.    9/16: Wound bed with granular tissue, edges are thick but appear better than previous assessments. . NPWT VF to encourage closure by secondary intention and manage drainage while encouraging oxygenation and granulation to the wound bed. 2 layer compression to assist in decrease of swelling and encourage blood flow to wounds. Wound team to follow up and change 3x/week.      Goals: Steady decrease in wound area and depth weekly.    NURSING PLAN OF CARE ORDERS (X):    Dressing changes: See Dressing Care orders: X  Skin care: See Skin Care orders:   Rectal tube care: See Rectal Tube Care orders:   Other orders:      WOUND TEAM PLAN OF CARE:   Dressing changes by wound team:                   Follow up 3 times weekly:                NPWT change 3 times weekly:   X  Follow up 1-2 times weekly:      Follow up Bi-Monthly:                   Follow up as needed:       Other  (explain):     Anticipated discharge plans: pending guardianship.   LTACH:        SNF/Rehab: X - patient will need ongoing wound care for LLE upon discharge - 3x/week.                 Home Health Care:           Outpatient Wound Center:            Self Care:

## 2020-09-25 NOTE — CARE PLAN
Problem: Safety  Goal: Will remain free from falls  Outcome: PROGRESSING AS EXPECTED  Note:  Treaded socks in place,  Side rails up x2.  Bed in low, locked position.  Call light within reach.  Patient is up self in room       Problem: Discharge Barriers/Planning  Goal: Patient's continuum of care needs will be met  Outcome: PROGRESSING SLOWER THAN EXPECTED  Note:  Patient is requiring placement and is pending guardianship.  Until that is in place, patient cannot leave

## 2020-09-25 NOTE — PROGRESS NOTES
Assumed care of patient at  0915. Sitter at bedside. Patient is currently resting. No s/s of distress noted.  All needs met at this time. Pt is instructed to call when in need of assistance. Bed in lowest and locked position. Call light within reach. Hourly rounding in place.

## 2020-09-26 PROCEDURE — 700102 HCHG RX REV CODE 250 W/ 637 OVERRIDE(OP): Performed by: NURSE PRACTITIONER

## 2020-09-26 PROCEDURE — A9270 NON-COVERED ITEM OR SERVICE: HCPCS | Performed by: NURSE PRACTITIONER

## 2020-09-26 PROCEDURE — 700105 HCHG RX REV CODE 258

## 2020-09-26 PROCEDURE — 99231 SBSQ HOSP IP/OBS SF/LOW 25: CPT | Performed by: NURSE PRACTITIONER

## 2020-09-26 PROCEDURE — 770021 HCHG ROOM/CARE - ISO PRIVATE

## 2020-09-26 RX ORDER — SODIUM CHLORIDE 9 MG/ML
INJECTION, SOLUTION INTRAVENOUS
Status: COMPLETED
Start: 2020-09-26 | End: 2020-09-26

## 2020-09-26 RX ADMIN — DIVALPROEX SODIUM 125 MG: 125 CAPSULE ORAL at 12:12

## 2020-09-26 RX ADMIN — OXYCODONE HYDROCHLORIDE 5 MG: 5 TABLET ORAL at 16:08

## 2020-09-26 RX ADMIN — DIVALPROEX SODIUM 125 MG: 125 CAPSULE ORAL at 22:00

## 2020-09-26 RX ADMIN — GABAPENTIN 100 MG: 100 CAPSULE ORAL at 16:08

## 2020-09-26 RX ADMIN — OXYCODONE HYDROCHLORIDE 5 MG: 5 TABLET ORAL at 05:30

## 2020-09-26 RX ADMIN — ACETAMINOPHEN 650 MG: 325 TABLET, FILM COATED ORAL at 05:29

## 2020-09-26 RX ADMIN — SODIUM CHLORIDE 1000 ML: 9 INJECTION, SOLUTION INTRAVENOUS at 11:19

## 2020-09-26 RX ADMIN — RISPERIDONE 0.5 MG: 0.5 TABLET ORAL at 05:30

## 2020-09-26 RX ADMIN — OXYCODONE HYDROCHLORIDE 5 MG: 5 TABLET ORAL at 12:12

## 2020-09-26 RX ADMIN — Medication 400 MG: at 05:30

## 2020-09-26 RX ADMIN — Medication 400 MG: at 16:08

## 2020-09-26 RX ADMIN — AMLODIPINE BESYLATE 5 MG: 5 TABLET ORAL at 05:30

## 2020-09-26 RX ADMIN — GABAPENTIN 100 MG: 100 CAPSULE ORAL at 12:12

## 2020-09-26 RX ADMIN — OXYCODONE HYDROCHLORIDE 5 MG: 5 TABLET ORAL at 00:47

## 2020-09-26 RX ADMIN — GABAPENTIN 100 MG: 100 CAPSULE ORAL at 05:29

## 2020-09-26 RX ADMIN — OXYCODONE HYDROCHLORIDE 5 MG: 5 TABLET ORAL at 20:08

## 2020-09-26 RX ADMIN — DIVALPROEX SODIUM 125 MG: 125 CAPSULE ORAL at 05:29

## 2020-09-26 RX ADMIN — RISPERIDONE 1 MG: 1 TABLET ORAL at 16:08

## 2020-09-26 ASSESSMENT — PAIN DESCRIPTION - PAIN TYPE
TYPE: ACUTE PAIN

## 2020-09-26 ASSESSMENT — ENCOUNTER SYMPTOMS: MYALGIAS: 1

## 2020-09-26 ASSESSMENT — FIBROSIS 4 INDEX: FIB4 SCORE: 0.68

## 2020-09-26 NOTE — CARE PLAN
Problem: Safety  Goal: Will remain free from falls  Outcome: PROGRESSING AS EXPECTED   Fall precautions in place. 1:1 safety sitter at bedside. Encouraged patient to use call light to alert staff of needs, patient verbalized understanding. Call light and personal belongings within reach. Hourly rounding in place.     Problem: Pain Management  Goal: Pain level will decrease to patient's comfort goal  Outcome: PROGRESSING AS EXPECTED   Patient rates pain using 0-10 pain rating scale. Patient medicated for pain per MAR.

## 2020-09-26 NOTE — PROGRESS NOTES
"Hospital Medicine Bi Weekly Progress Note    Date of Service  9/26/2020    Chief Complaint  LLE wound    Hospital Course   Mr. Varela is a 64-year-old male with PMH significant for homelessness, etoh use, tobacco dependence and chronic LLE wound who presented 8/7/2020 with LLE wound infection.  He was hospitalized at our facility in April and evaluated by orthopedic surgery who recommended wound care.  Wound cultures at that time grew MSSA and strep.  Patient reported losing his Medicaid card and having no transportation to wound clinic.  He was seen at [facility] earlier this week and prescribed ABX, however he has not filled the prescription.  US LLE negative for DVT.  Treated for sepsis with empiric ABX and wound care.  He was found to have head lice and underwent treatments. Continued to have intermittent agitation so was started on risperdol, depakote and gabapentin per psych recs.  He has been deemed incapacitated to make medical decisions and is currently pending guardianship and placement.  He is medically stable and will be only be evaluated 2 times weekly unless there is a clinical change.          Interval Problem Update  9/23- Patient resting in bed, has been utilizing pain medications. Wound vac to left lower extremity. LPS continuing to see patient for continued wound care. Patient will likely need wound vac on discharge. Sitter at doorway.       9/26- Patient layingin bed resting. States he feels \"like a truck ran over him\" but believes more rest will help him feel better. Encouraged Boost protein supplements at bedside to help with wound healing, patient agreed to drink them but states he has trouble opening them. Encouraged to ask staff for help.       Consultants/Specialty  Psychiatry    Code Status  Full Code    Disposition  Homeless. Pursuing guardianship and placement      Review of Systems   Unable to perform ROS: Mental status change   Musculoskeletal: Positive for myalgias. "     Physical Exam   Constitutional: Vital signs are normal. He appears unhealthy.   HENT:   Head: Normocephalic.   Eyes: Lids are normal.   Neck: Neck supple.   Cardiovascular: Normal rate.   Pulmonary/Chest: Effort normal. No respiratory distress.   Musculoskeletal:      Comments: GATES   Neurological:   Sleepy this am    Skin:   Wound vac and wrappings to left lower extremity    Psychiatric: His affect is labile. He expresses impulsivity. He exhibits abnormal recent memory. He has a flat affect.   Nursing note and vitals reviewed.      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   -blood cultures (-) to date  -afebrile. Leukocytosis resolved  -LPS following for serial debridements; no need for Vac at dc  -Cx 9/1 Strep, MRSA, diptheroids: completed Zyvox 9/2 thru 9/16    Encephalopathy- (present on admission)  Assessment & Plan  -acute on chronic, likely due Wernicke's   -Psychiatry: incapacitated to make medical decision or leave AMA   -Requires guardianship and placement  -Avoid narcotics and hypnotics.  Cautious use of benzodiazepines for alcohol withdrawal protocol  -Frequent reorientation  -Sleep hygiene    Non-compliance  Assessment & Plan  -likely also component of psychiatric disorder and ETOH abuse  -Psychiatric consulted and suggests patient is incapacitated to make medical decisions or leave AMA  -ongoing encouragement for compliance.    Psychiatric disorder  Assessment & Plan  -unknown/unspecified  -psychiatry consulted for agitation - incapacitated to leave AMA or make medical decisions, appreciate recs: risperdol 0.5 -1mg BID, cont depakote 125mg TID (8/15/20, watch LFTs), added neurotin 100mg TID 8/16  -Pending guardianship. Application submitted 9/3.    Flexion contracture of knee, 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)  Assessment & Plan  -secondary to  chronic wound in that area  -PT OT  -encourage mobility    Emphysema/COPD (HCC)  Assessment & Plan  Stable. RA. Chronic w/o exacerbation    Head lice  Assessment & Plan  Head lice found  S/p first round of treatment 8/10, treated again on 8/13 given active lice seen by CNA, will need second treatment 8/20. Given 3rd treatment.  If still having issues will likely need to shave patient    Alcohol dependence (HCC)- (present on admission)  Assessment & Plan  -history of. Cont MV    Protein malnutrition (HCC)  Assessment & Plan  -history of ETOH and homelessness  -dietary consult  -TID supplements  -encourage PO intake    Tobacco dependence- (present on admission)  Assessment & Plan  -Nicotine replacement protocol       VTE prophylaxis: Enoxaparin    Charo Connor, A.P.R.N.    I have performed a physical exam and reviewed and updated ROS and Plan today 9/26/2020. In review of previous there are no changes except as documented above    I certify that the patient requires continued medically necessary hospital services for the treatment of psychosis with neurocognitive disorder. The patient will remain in the hospital for the foreseeable future.  Discharge may or may not occur in the next 20 days due to ongoing discharge delays due to no medically acceptable discharge option.

## 2020-09-26 NOTE — CARE PLAN
Problem: Safety  Goal: Will remain free from falls  Outcome: PROGRESSING AS EXPECTED  Note: 1:1 sitter at bedside for safety.      Problem: Discharge Barriers/Planning  Goal: Patient's continuum of care needs will be met  Outcome: PROGRESSING SLOWER THAN EXPECTED  Note: Guardianship is in process.

## 2020-09-27 PROCEDURE — A9270 NON-COVERED ITEM OR SERVICE: HCPCS | Performed by: NURSE PRACTITIONER

## 2020-09-27 PROCEDURE — 700102 HCHG RX REV CODE 250 W/ 637 OVERRIDE(OP): Performed by: NURSE PRACTITIONER

## 2020-09-27 PROCEDURE — 700105 HCHG RX REV CODE 258

## 2020-09-27 PROCEDURE — 700101 HCHG RX REV CODE 250: Performed by: NURSE PRACTITIONER

## 2020-09-27 PROCEDURE — 770021 HCHG ROOM/CARE - ISO PRIVATE

## 2020-09-27 RX ORDER — SODIUM CHLORIDE 9 MG/ML
INJECTION, SOLUTION INTRAVENOUS
Status: COMPLETED
Start: 2020-09-27 | End: 2020-09-27

## 2020-09-27 RX ADMIN — OXYCODONE HYDROCHLORIDE 5 MG: 5 TABLET ORAL at 21:16

## 2020-09-27 RX ADMIN — GABAPENTIN 100 MG: 100 CAPSULE ORAL at 05:08

## 2020-09-27 RX ADMIN — RISPERIDONE 1 MG: 1 TABLET ORAL at 16:46

## 2020-09-27 RX ADMIN — GABAPENTIN 100 MG: 100 CAPSULE ORAL at 13:21

## 2020-09-27 RX ADMIN — OXYCODONE HYDROCHLORIDE 5 MG: 5 TABLET ORAL at 09:41

## 2020-09-27 RX ADMIN — AMLODIPINE BESYLATE 5 MG: 5 TABLET ORAL at 05:12

## 2020-09-27 RX ADMIN — SODIUM CHLORIDE 1000 ML: 9 INJECTION, SOLUTION INTRAVENOUS at 16:45

## 2020-09-27 RX ADMIN — DIVALPROEX SODIUM 125 MG: 125 CAPSULE ORAL at 21:15

## 2020-09-27 RX ADMIN — RISPERIDONE 0.5 MG: 0.5 TABLET ORAL at 05:08

## 2020-09-27 RX ADMIN — POLYETHYLENE GLYCOL 3350 1 PACKET: 17 POWDER, FOR SOLUTION ORAL at 05:08

## 2020-09-27 RX ADMIN — OXYCODONE HYDROCHLORIDE 5 MG: 5 TABLET ORAL at 13:21

## 2020-09-27 RX ADMIN — DIVALPROEX SODIUM 125 MG: 125 CAPSULE ORAL at 13:21

## 2020-09-27 RX ADMIN — OXYCODONE HYDROCHLORIDE 5 MG: 5 TABLET ORAL at 00:39

## 2020-09-27 RX ADMIN — Medication 400 MG: at 05:08

## 2020-09-27 RX ADMIN — OXYCODONE HYDROCHLORIDE 5 MG: 5 TABLET ORAL at 16:46

## 2020-09-27 RX ADMIN — DIVALPROEX SODIUM 125 MG: 125 CAPSULE ORAL at 05:08

## 2020-09-27 RX ADMIN — GABAPENTIN 100 MG: 100 CAPSULE ORAL at 16:46

## 2020-09-27 RX ADMIN — Medication 400 MG: at 16:46

## 2020-09-27 RX ADMIN — OXYCODONE HYDROCHLORIDE 5 MG: 5 TABLET ORAL at 05:08

## 2020-09-27 ASSESSMENT — PAIN SCALES - WONG BAKER
WONGBAKER_NUMERICALRESPONSE: HURTS JUST A LITTLE BIT
WONGBAKER_NUMERICALRESPONSE: HURTS JUST A LITTLE BIT

## 2020-09-27 ASSESSMENT — PAIN DESCRIPTION - PAIN TYPE
TYPE: ACUTE PAIN;CHRONIC PAIN

## 2020-09-27 NOTE — PROGRESS NOTES
1923 Received report from day EKATERINA Oh, POC discussed. Call light in place, bed lowered and locked. Encourage pt to call if needed anything. Pt A&Ox4, RA, restless, wound vac in place LLE, on  Legal hold, safety sitter in place.

## 2020-09-27 NOTE — CARE PLAN
Problem: Safety  Goal: Will remain free from falls  Outcome: PROGRESSING AS EXPECTED  Note: 1:1 Sitter at bedside.      Problem: Discharge Barriers/Planning  Goal: Patient's continuum of care needs will be met  Outcome: PROGRESSING SLOWER THAN EXPECTED  Note: Pending Guardianship.

## 2020-09-27 NOTE — CARE PLAN
Problem: Safety  Goal: Will remain free from falls  Outcome: PROGRESSING AS EXPECTED  Note: Assess LOC, assess environment. Maintain a clutter-free environment,  bed lowered and locked, non-skid socks in use, call light in place, personal belongings within reach. Offered toileting. Fall precautions in place.      Problem: Knowledge Deficit  Goal: Knowledge of disease process/condition, treatment plan, diagnostic tests, and medications will improve  Outcome: PROGRESSING SLOWER THAN EXPECTED  Intervention: Assess knowledge level of disease process/condition, treatment plan, diagnostic tests, and medications  Note: Update POC, encourage questions or to verbalize any concerns       Problem: Psychosocial Needs:  Goal: Level of anxiety will decrease  Outcome: PROGRESSING AS EXPECTED  Intervention: Identify and develop with patient strategies to cope with anxiety triggers  Note: Educate about pain management

## 2020-09-28 LAB
GRAM STN SPEC: NORMAL
SIGNIFICANT IND 70042: NORMAL
SITE SITE: NORMAL
SOURCE SOURCE: NORMAL

## 2020-09-28 PROCEDURE — A9270 NON-COVERED ITEM OR SERVICE: HCPCS | Performed by: NURSE PRACTITIONER

## 2020-09-28 PROCEDURE — 302098 PASTE RING (FLAT): Performed by: NURSE PRACTITIONER

## 2020-09-28 PROCEDURE — 87077 CULTURE AEROBIC IDENTIFY: CPT

## 2020-09-28 PROCEDURE — 700102 HCHG RX REV CODE 250 W/ 637 OVERRIDE(OP): Performed by: NURSE PRACTITIONER

## 2020-09-28 PROCEDURE — 97606 NEG PRS WND THER DME>50 SQCM: CPT

## 2020-09-28 PROCEDURE — 770021 HCHG ROOM/CARE - ISO PRIVATE

## 2020-09-28 PROCEDURE — 87070 CULTURE OTHR SPECIMN AEROBIC: CPT

## 2020-09-28 PROCEDURE — 87205 SMEAR GRAM STAIN: CPT

## 2020-09-28 PROCEDURE — 87186 SC STD MICRODIL/AGAR DIL: CPT

## 2020-09-28 PROCEDURE — 97605 NEG PRS WND THER DME<=50SQCM: CPT | Mod: XU

## 2020-09-28 RX ADMIN — OXYCODONE HYDROCHLORIDE 5 MG: 5 TABLET ORAL at 08:13

## 2020-09-28 RX ADMIN — GABAPENTIN 100 MG: 100 CAPSULE ORAL at 12:03

## 2020-09-28 RX ADMIN — RISPERIDONE 1 MG: 1 TABLET ORAL at 17:58

## 2020-09-28 RX ADMIN — OXYCODONE HYDROCHLORIDE 5 MG: 5 TABLET ORAL at 12:03

## 2020-09-28 RX ADMIN — DIVALPROEX SODIUM 125 MG: 125 CAPSULE ORAL at 05:11

## 2020-09-28 RX ADMIN — OXYCODONE HYDROCHLORIDE 5 MG: 5 TABLET ORAL at 03:58

## 2020-09-28 RX ADMIN — DIVALPROEX SODIUM 125 MG: 125 CAPSULE ORAL at 21:19

## 2020-09-28 RX ADMIN — GABAPENTIN 100 MG: 100 CAPSULE ORAL at 05:11

## 2020-09-28 RX ADMIN — DIVALPROEX SODIUM 125 MG: 125 CAPSULE ORAL at 15:52

## 2020-09-28 RX ADMIN — OXYCODONE HYDROCHLORIDE 5 MG: 5 TABLET ORAL at 15:52

## 2020-09-28 RX ADMIN — OXYCODONE HYDROCHLORIDE 5 MG: 5 TABLET ORAL at 19:10

## 2020-09-28 RX ADMIN — Medication 400 MG: at 05:11

## 2020-09-28 RX ADMIN — AMLODIPINE BESYLATE 5 MG: 5 TABLET ORAL at 05:12

## 2020-09-28 RX ADMIN — Medication 400 MG: at 17:58

## 2020-09-28 RX ADMIN — GABAPENTIN 100 MG: 100 CAPSULE ORAL at 17:58

## 2020-09-28 RX ADMIN — RISPERIDONE 0.5 MG: 0.5 TABLET ORAL at 05:11

## 2020-09-28 RX ADMIN — OXYCODONE HYDROCHLORIDE 5 MG: 5 TABLET ORAL at 23:20

## 2020-09-28 ASSESSMENT — PAIN DESCRIPTION - PAIN TYPE
TYPE: ACUTE PAIN;CHRONIC PAIN
TYPE: ACUTE PAIN
TYPE: ACUTE PAIN;CHRONIC PAIN

## 2020-09-28 ASSESSMENT — PAIN SCALES - WONG BAKER: WONGBAKER_NUMERICALRESPONSE: HURTS JUST A LITTLE BIT

## 2020-09-28 NOTE — DISCHARGE PLANNING
Anticipated Discharge Disposition: Gaurdianship with Group Home    Action: pt pending public guardianship.    Barriers to Discharge: guardianship, group home placement    Plan: f/u with medical team, Teri Madrigal

## 2020-09-28 NOTE — WOUND TEAM
[facility]  Inpatient Services   Wound and Skin Care Progress Note    Admission Date: 8/7/2020     Last order of IP CONSULT TO WOUND CARE was found on 9/1/2020 from Hospital Encounter on 8/7/2020     HPI, PMH, SH: Reviewed    Unit where seen by Wound Team: [id number]/00     WOUND CONSULT/FOLLOW UP RELATED TO:  Left leg scheduled NPWT change     Self Report / Pain Level:  Patient asleep in bed.     OBJECTIVE:  Patient sleeping in bed.     WOUND TYPE, LOCATION, CHARACTERISTICS (Pressure Injuries: location, stage, POA or date identified)     Negative Pressure Wound Therapy 09/03/20 Leg Lower;Medial;Posterior Left (Active)   NPWT Pump Mode / Pressure Setting Ulta;Continuous;125 mmHg 09/28/20 0930   Dressing Type Medium;Black Foam (Veraflo) 09/28/20 0930   Number of Foam Pieces Used 4 09/28/20 0930   Canister Changed No 09/28/20 0930   Output (mL) 450 mL 09/26/20 2023   NEXT Dressing Change/Treatment Date 09/30/20 09/28/20 0930   VAC VeraFlo Irrigant Normal Saline 09/28/20 0930   VAC VeraFlo Soak Time (mins) 6 09/28/20 0930   VAC VeraFlo Instill Volume (ml) 24 09/28/20 0930   VAC VeraFlo - Therapy Time (hrs) 1 09/28/20 0930   VAC VeraFlo Pressure (mm/Hg) Continuous;125 mmHg 09/28/20 0930   WOUND NURSE ONLY - Time Spent with Patient (mins) 75 09/23/20 1430           Wound 08/07/20 Full Thickness Wound Leg LLE posterior, extends medially and laterally (Active)   Wound Image     09/21/20 1200   Site Assessment Red;Yellow 09/28/20 0930   Periwound Assessment Clean;Dry;Intact;Scar tissue 09/28/20 0930   Margins Defined edges;Attached edges 09/28/20 0930   Closure Secondary intention 09/28/20 0930   Drainage Amount Moderate 09/28/20 0930   Drainage Description Serosanguineous 09/28/20 0930   Treatments Cleansed;CSWD - Conservative Sharp Wound Debridement;Irrigation;Site care 09/28/20 0930   Wound Cleansing Approved Wound Cleanser 09/28/20 0930   Periwound Protectant Benzoin;Paste Ring;Drape 09/28/20 0930   Dressing  Cleansing/Solutions Normal Saline 20   Dressing Options Wound Vac 20   Dressing Changed Changed 20   Dressing Status Clean;Dry;Intact 20   Dressing Change/Treatment Frequency By Wound Team Only 20   NEXT Dressing Change/Treatment Date 20   NEXT Weekly Photo (Inpatient Only) 20   Non-staged Wound Description Full thickness 20   Wound Length (cm) 33 cm 20 1200   Wound Width (cm) 13.3 cm 20 1200   Wound Depth (cm) 0.3 cm 20 1200   Wound Surface Area (cm^2) 438.9 cm^2 20 1200   Wound Volume (cm^3) 131.67 cm^3 20 1200   Post-Procedure Length (cm) 33 cm 20 1200   Post-Procedure Width (cm) 13.3 cm 20 1200   Post-Procedure Depth (cm) 0.3 cm 20 1200   Post-Procedure Surface Area (cm^2) 438.9 cm^2 20 1200   Post-Procedure Volume (cm^3) 131.67 cm^3 20 1200   Wound Healing % -150 20 1200   Wound Bed Granulation (%) 100 % 20 1600   Wound Bed Epithelium (%) 0 % 20 1300   Wound Bed Slough (%) 50 % 20 1300   Wound Bed Granulation (%) - Post-Procedure 0 % 20 1300   Wound Bed Epithelium % - (Post-Procedure) 0 % 20 1300   Wound Bed Slough % - (Post-Procedure) 0 % 20 1300   Wound Bed Eschar % - (Post-Procedure) 0 % 20 1300   Tunneling (cm) 0 cm 20 1300   Undermining (cm) 0 cm 20 1300   Shape Irregular 20   Wound Odor None 20   Pulses Left;2+;DP;PT 20   Exposed Structures None 20   WOUND NURSE ONLY - Time Spent with Patient (mins) 60 09/28/20 0930          Vascular:    KOSTAS:   KOSTAS Results, Last 30 Days Us-kostas Single Level Bilat    Result Date: 2020  Narrative  Vascular Laboratory  Conclusions  1.  Normal bilateral KOSTAS's.  GRUPO GANN  Age:    64    Gender:     M  MRN:    [id number]  :    1955      BSA:  Exam Date:     2020 09:59  Room #:     Inpatient   Priority:     Routine  Ht (in):             Wt (lb):  Ordering Physician:        EDDA CANADA  Referring Physician:       [id number]  Sonographer:               Deja Ellis RDMS                             RVT  Study Type:                Complete Bilateral  Technical Quality:         Adequate  Indications:     Ulceration of LE  CPT Codes:       45405  ICD Codes:       707.1  History:         Nonhealing wound of left lower extremity.  Limitations:                 RIGHT  Waveform            Systolic BPs (mmHg)                             117           Brachial  Triphasic                                Common Femoral  Triphasic                  138           Posterior Tibial  Triphasic                  132           Dorsalis Pedis                                           Peroneal                             1.18          KOSTAS                                           TBI                       LEFT  Waveform        Systolic BPs (mmHg)                             114           Brachial  Triphasic                                Common Femoral  Triphasic                  127           Posterior Tibial  Triphasic                  122           Dorsalis Pedis                                           Peroneal                             1.09          KOSTAS                                           TBI  Findings  Right.  Doppler waveforms of the common femoral artery are of high amplitude and  triphasic.  Doppler waveforms at the ankle are brisk and triphasic.  Ankle-brachial index is normal.  Left.  Doppler waveforms of the common femoral artery are of high amplitude and  triphasic.  Doppler waveforms at the ankle are brisk and triphasic.  Ankle-brachial index is normal.  Unable to obtained popliteal waveforms due to wound bandages.  Additional testing was not performed in accordance with lower extremity  arterial evaluation protocol.  Clover VINCENT  Zulma  (Electronically Signed)  Final Date:      02 September 2020                   11:14    Lab Values:    Lab Results   Component Value Date/Time    WBC 6.5 09/03/2020 11:31 AM    RBC 3.82 (L) 09/03/2020 11:31 AM    HEMOGLOBIN 11.2 (L) 09/03/2020 11:31 AM    HEMATOCRIT 35.0 (L) 09/03/2020 11:31 AM    CREACTPROT 1.07 (H) 08/12/2020 01:55 PM    SEDRATEWES 85 (H) 08/07/2020 01:20 PM    HBA1C 5.7 (H) 11/09/2018 06:30 PM        Culture Results show:  Recent Results (from the past 720 hour(s))   CULTURE WOUND W/ GRAM STAIN    Collection Time: 09/01/20  2:00 PM    Specimen: Left Leg; Wound   Result Value Ref Range    Significant Indicator POS (POS)     Source WND     Site LEFT LEG     Culture Result - (A)     Gram Stain Result       Moderate Gram positive cocci.  Moderate Gram positive rods.      Culture Result (A)      Beta Hemolytic Streptococcus group A  Moderate growth      Culture Result (A)      Methicillin Resistant Staphylococcus aureus  Moderate growth      Culture Result (A)      Diphtheroids  Moderate growth  Mixed morphologies.         INTERVENTIONS BY WOUND TEAM:  Patient sleeping in bed, woken up patient and explained wound VAC change.  Patient was able to lift leg and take off pants, had patient lay on his stomach for dressing change.  2 layer compression wrap removed and left LE was washed with foam wash and warm washcloth, air dried.  Wound vac dressing removed and wound bed cleansed with wound cleanser and gauze.  Meredith-wound prepped with no sting and paste ring.  Black veraflo foam to wound bed, sealed with drape and TRAC pad + button applied to proximal wound bed at thigh.  Resumed Veraflo,veraflo settings 24mL for 6 minutes instillation hourly.  No leaks noted. 2 layer compression wrap applied to left LE.     Interdisciplinary consultation: Patient, Bedside RN      EVALUATION / RATIONALE FOR TREATMENT:    9/28: malodorous today, culture taken.  Will change to regular VAC next change  9/25: Odor  noted, though unclear if from wound or pt, consider shower before next Vac change. Consider regular vac next change, wound granular and superficial.   9/23: Patient still awaiting guardianship for placement.   9/18: wound granulating nicely and responding well to current treatment.    9/16: Wound bed with granular tissue, edges are thick but appear better than previous assessments. . NPWT VF to encourage closure by secondary intention and manage drainage while encouraging oxygenation and granulation to the wound bed. 2 layer compression to assist in decrease of swelling and encourage blood flow to wounds. Wound team to follow up and change 3x/week.      Goals: Steady decrease in wound area and depth weekly.    NURSING PLAN OF CARE ORDERS (X):    Dressing changes: See Dressing Care orders: X  Skin care: See Skin Care orders:   Rectal tube care: See Rectal Tube Care orders:   Other orders:      WOUND TEAM PLAN OF CARE:   Dressing changes by wound team:                   Follow up 3 times weekly:                NPWT change 3 times weekly:   X  Follow up 1-2 times weekly:      Follow up Bi-Monthly:                   Follow up as needed:       Other (explain):     Anticipated discharge plans: pending guardianship.   LTACH:        SNF/Rehab: X - patient will need ongoing wound care for LLE upon discharge - 3x/week.                 Home Health Care:           Outpatient Wound Center:            Self Care:

## 2020-09-28 NOTE — CARE PLAN
Problem: Infection  Goal: Will remain free from infection  Outcome: PROGRESSING AS EXPECTED  Educate patient on proper hand hygiene techniques, ensure wound vac is working adequately with no leak. Monitor for signs and symptoms of infection  Problem: Pain Management  Goal: Pain level will decrease to patient's comfort goal  Outcome: PROGRESSING AS EXPECTED   Assess patient pain level regularly. Pain is well managed with oral medications, and distractions.

## 2020-09-29 PROCEDURE — 700102 HCHG RX REV CODE 250 W/ 637 OVERRIDE(OP): Performed by: NURSE PRACTITIONER

## 2020-09-29 PROCEDURE — A9270 NON-COVERED ITEM OR SERVICE: HCPCS | Performed by: NURSE PRACTITIONER

## 2020-09-29 PROCEDURE — 700105 HCHG RX REV CODE 258

## 2020-09-29 PROCEDURE — 770021 HCHG ROOM/CARE - ISO PRIVATE

## 2020-09-29 RX ORDER — SODIUM CHLORIDE 9 MG/ML
INJECTION, SOLUTION INTRAVENOUS
Status: COMPLETED
Start: 2020-09-29 | End: 2020-09-29

## 2020-09-29 RX ADMIN — Medication 400 MG: at 16:35

## 2020-09-29 RX ADMIN — AMLODIPINE BESYLATE 5 MG: 5 TABLET ORAL at 06:27

## 2020-09-29 RX ADMIN — GABAPENTIN 100 MG: 100 CAPSULE ORAL at 12:14

## 2020-09-29 RX ADMIN — DIVALPROEX SODIUM 125 MG: 125 CAPSULE ORAL at 06:27

## 2020-09-29 RX ADMIN — OXYCODONE HYDROCHLORIDE 5 MG: 5 TABLET ORAL at 21:04

## 2020-09-29 RX ADMIN — GABAPENTIN 100 MG: 100 CAPSULE ORAL at 16:35

## 2020-09-29 RX ADMIN — OXYCODONE HYDROCHLORIDE 5 MG: 5 TABLET ORAL at 12:14

## 2020-09-29 RX ADMIN — DIVALPROEX SODIUM 125 MG: 125 CAPSULE ORAL at 13:50

## 2020-09-29 RX ADMIN — RISPERIDONE 0.5 MG: 0.5 TABLET ORAL at 06:27

## 2020-09-29 RX ADMIN — GABAPENTIN 100 MG: 100 CAPSULE ORAL at 06:27

## 2020-09-29 RX ADMIN — RISPERIDONE 1 MG: 1 TABLET ORAL at 16:35

## 2020-09-29 RX ADMIN — Medication 400 MG: at 06:27

## 2020-09-29 RX ADMIN — OXYCODONE HYDROCHLORIDE 5 MG: 5 TABLET ORAL at 06:27

## 2020-09-29 RX ADMIN — OXYCODONE HYDROCHLORIDE 5 MG: 5 TABLET ORAL at 16:35

## 2020-09-29 RX ADMIN — DIVALPROEX SODIUM 125 MG: 125 CAPSULE ORAL at 21:03

## 2020-09-29 RX ADMIN — SODIUM CHLORIDE 1000 ML: 9 INJECTION, SOLUTION INTRAVENOUS at 09:32

## 2020-09-29 ASSESSMENT — PAIN DESCRIPTION - PAIN TYPE
TYPE: ACUTE PAIN

## 2020-09-29 NOTE — CARE PLAN
Problem: Safety  Goal: Will remain free from falls  Outcome: PROGRESSING AS EXPECTED   Bed in lowest position, call within reach, rounding in place, low risk for falls, educated to call if dizzy or unsteady   Problem: Venous Thromboembolism (VTW)/Deep Vein Thrombosis (DVT) Prevention:  Goal: Patient will participate in Venous Thrombosis (VTE)/Deep Vein Thrombosis (DVT)Prevention Measures  Outcome: PROGRESSING AS EXPECTED   Pt is ambulatory scds refused

## 2020-09-29 NOTE — CARE PLAN
Problem: Infection  Goal: Will remain free from infection  Outcome: PROGRESSING AS EXPECTED   Educate patient on proper handwashing techniques. Ensure wound vac is working adequately. Monitor for signs and symptoms of infection.  Problem: Pain Management  Goal: Pain level will decrease to patient's comfort goal  Outcome: PROGRESSING AS EXPECTED   Patient's pain is well managed on oral medications. Continue to educate patient on non-pharmacological pain management techniques.

## 2020-09-29 NOTE — PROGRESS NOTES
Report received. Pt is A&O x3, with intermittent confusion, call light in placed. Educated patient to call for any needs. Wound vac to LLE. Pt with safety sitter 1:1 at bedside.

## 2020-09-29 NOTE — WOUND TEAM
"In to see pt for nail care. Pt removed shoe and started to remove nylon. He then stated, \"I have to remove all this s---. My nails are okay and I'll take care of them when I go home.\" Asked pt if he would like assistance from removing nylons and shoe. \"No\"  \"I don't want them done. I'll do it when I go home. Pt refused to have nails cut. Order DCd.  "

## 2020-09-30 LAB
BASOPHILS # BLD AUTO: 0.7 % (ref 0–1.8)
BASOPHILS # BLD: 0.04 K/UL (ref 0–0.12)
EOSINOPHIL # BLD AUTO: 0.2 K/UL (ref 0–0.51)
EOSINOPHIL NFR BLD: 3.7 % (ref 0–6.9)
ERYTHROCYTE [DISTWIDTH] IN BLOOD BY AUTOMATED COUNT: 47.7 FL (ref 35.9–50)
HCT VFR BLD AUTO: 32.6 % (ref 42–52)
HGB BLD-MCNC: 10.8 G/DL (ref 14–18)
IMM GRANULOCYTES # BLD AUTO: 0.03 K/UL (ref 0–0.11)
IMM GRANULOCYTES NFR BLD AUTO: 0.6 % (ref 0–0.9)
LYMPHOCYTES # BLD AUTO: 0.99 K/UL (ref 1–4.8)
LYMPHOCYTES NFR BLD: 18.2 % (ref 22–41)
MCH RBC QN AUTO: 28.9 PG (ref 27–33)
MCHC RBC AUTO-ENTMCNC: 33.1 G/DL (ref 33.7–35.3)
MCV RBC AUTO: 87.2 FL (ref 81.4–97.8)
MONOCYTES # BLD AUTO: 0.9 K/UL (ref 0–0.85)
MONOCYTES NFR BLD AUTO: 16.5 % (ref 0–13.4)
NEUTROPHILS # BLD AUTO: 3.29 K/UL (ref 1.82–7.42)
NEUTROPHILS NFR BLD: 60.3 % (ref 44–72)
NRBC # BLD AUTO: 0 K/UL
NRBC BLD-RTO: 0 /100 WBC
PLATELET # BLD AUTO: 429 K/UL (ref 164–446)
PMV BLD AUTO: 8.8 FL (ref 9–12.9)
RBC # BLD AUTO: 3.74 M/UL (ref 4.7–6.1)
WBC # BLD AUTO: 5.5 K/UL (ref 4.8–10.8)

## 2020-09-30 PROCEDURE — 700105 HCHG RX REV CODE 258

## 2020-09-30 PROCEDURE — 700102 HCHG RX REV CODE 250 W/ 637 OVERRIDE(OP): Performed by: NURSE PRACTITIONER

## 2020-09-30 PROCEDURE — 770021 HCHG ROOM/CARE - ISO PRIVATE

## 2020-09-30 PROCEDURE — A9270 NON-COVERED ITEM OR SERVICE: HCPCS | Performed by: NURSE PRACTITIONER

## 2020-09-30 PROCEDURE — 97606 NEG PRS WND THER DME>50 SQCM: CPT

## 2020-09-30 PROCEDURE — 99232 SBSQ HOSP IP/OBS MODERATE 35: CPT | Mod: 25 | Performed by: NURSE PRACTITIONER

## 2020-09-30 PROCEDURE — 99231 SBSQ HOSP IP/OBS SF/LOW 25: CPT | Performed by: NURSE PRACTITIONER

## 2020-09-30 PROCEDURE — 302098 PASTE RING (FLAT): Performed by: NURSE PRACTITIONER

## 2020-09-30 PROCEDURE — 700111 HCHG RX REV CODE 636 W/ 250 OVERRIDE (IP): Performed by: STUDENT IN AN ORGANIZED HEALTH CARE EDUCATION/TRAINING PROGRAM

## 2020-09-30 PROCEDURE — 99222 1ST HOSP IP/OBS MODERATE 55: CPT | Performed by: INTERNAL MEDICINE

## 2020-09-30 PROCEDURE — 85025 COMPLETE CBC W/AUTO DIFF WBC: CPT

## 2020-09-30 PROCEDURE — 700105 HCHG RX REV CODE 258: Performed by: STUDENT IN AN ORGANIZED HEALTH CARE EDUCATION/TRAINING PROGRAM

## 2020-09-30 PROCEDURE — 700101 HCHG RX REV CODE 250: Performed by: NURSE PRACTITIONER

## 2020-09-30 PROCEDURE — 36415 COLL VENOUS BLD VENIPUNCTURE: CPT

## 2020-09-30 RX ORDER — SODIUM HYPOCHLORITE 1.25 MG/ML
SOLUTION TOPICAL DAILY
Status: DISPENSED | OUTPATIENT
Start: 2020-09-30 | End: 2020-10-02

## 2020-09-30 RX ORDER — SODIUM CHLORIDE 9 MG/ML
INJECTION, SOLUTION INTRAVENOUS
Status: COMPLETED
Start: 2020-09-30 | End: 2020-09-30

## 2020-09-30 RX ADMIN — AMLODIPINE BESYLATE 5 MG: 5 TABLET ORAL at 06:33

## 2020-09-30 RX ADMIN — SODIUM HYPOCHLORITE 1 ML: 1.25 SOLUTION TOPICAL at 18:43

## 2020-09-30 RX ADMIN — DIVALPROEX SODIUM 125 MG: 125 CAPSULE ORAL at 20:22

## 2020-09-30 RX ADMIN — ACETAMINOPHEN 650 MG: 325 TABLET, FILM COATED ORAL at 20:22

## 2020-09-30 RX ADMIN — OXYCODONE HYDROCHLORIDE 5 MG: 5 TABLET ORAL at 18:40

## 2020-09-30 RX ADMIN — RISPERIDONE 1 MG: 1 TABLET ORAL at 18:40

## 2020-09-30 RX ADMIN — OXYCODONE HYDROCHLORIDE 5 MG: 5 TABLET ORAL at 22:42

## 2020-09-30 RX ADMIN — Medication 400 MG: at 18:40

## 2020-09-30 RX ADMIN — DIVALPROEX SODIUM 125 MG: 125 CAPSULE ORAL at 13:10

## 2020-09-30 RX ADMIN — GABAPENTIN 100 MG: 100 CAPSULE ORAL at 18:40

## 2020-09-30 RX ADMIN — SODIUM CHLORIDE 500 ML: 9 INJECTION, SOLUTION INTRAVENOUS at 22:40

## 2020-09-30 RX ADMIN — RISPERIDONE 0.5 MG: 0.5 TABLET ORAL at 06:33

## 2020-09-30 RX ADMIN — DIVALPROEX SODIUM 125 MG: 125 CAPSULE ORAL at 06:33

## 2020-09-30 RX ADMIN — OXYCODONE HYDROCHLORIDE 5 MG: 5 TABLET ORAL at 13:58

## 2020-09-30 RX ADMIN — OXYCODONE HYDROCHLORIDE 5 MG: 5 TABLET ORAL at 06:33

## 2020-09-30 RX ADMIN — Medication 400 MG: at 06:33

## 2020-09-30 RX ADMIN — OXYCODONE HYDROCHLORIDE 5 MG: 5 TABLET ORAL at 10:55

## 2020-09-30 RX ADMIN — GABAPENTIN 100 MG: 100 CAPSULE ORAL at 11:52

## 2020-09-30 RX ADMIN — PIPERACILLIN AND TAZOBACTAM 3.38 G: 3; .375 INJECTION, POWDER, LYOPHILIZED, FOR SOLUTION INTRAVENOUS; PARENTERAL at 22:40

## 2020-09-30 RX ADMIN — GABAPENTIN 100 MG: 100 CAPSULE ORAL at 06:33

## 2020-09-30 RX ADMIN — ACETAMINOPHEN 650 MG: 325 TABLET, FILM COATED ORAL at 10:55

## 2020-09-30 ASSESSMENT — PAIN DESCRIPTION - PAIN TYPE
TYPE: ACUTE PAIN

## 2020-09-30 ASSESSMENT — ENCOUNTER SYMPTOMS
DIZZINESS: 0
NERVOUS/ANXIOUS: 0
FALLS: 0
SHORTNESS OF BREATH: 0
INSOMNIA: 0
COUGH: 1
HEADACHES: 1
VOMITING: 0
FEVER: 0
DOUBLE VISION: 0
PALPITATIONS: 0
NAUSEA: 0
BLURRED VISION: 0
CHILLS: 0

## 2020-09-30 NOTE — PROGRESS NOTES
"WOUND CARE PROVIDER PROGRESS NOTE            HPI: 65-year-old male homelessness, EtOH use, tobacco dependence, chronic left lower extremity wound admitted 8/7/2020 with left lower extremity wound infection.  Patient was recently hospitalized at [facility] in April 2020, evaluated by orthopedic surgery who recommended wound care.  Wound culture grew MSSA and strep.  Patient was recently seen at [facility] prior to this admission was given a prescription for antibiotics but did not fill this.  Patient reports that he has had this wound for 8 to 10 years.  He reports that he fell and went \"ass over tea kettle\".  He reports that he would clean the wound almost daily with soap and water at the homeless shelter.  Per chart review, patient reported he developed the ulcer in May 2018 when he fell while hiking.  Patient was seen at wound care clinic in August 2018.  Patient had a  assisting him while he was living at [facility] care housing.  Wound VAC /was ordered however  had concerns with patient's compliancy and/ access to medical care.  Skilled nursing facility was contacted to have patient be admitted, however he was not accepted due to Medicaid.    Patient is on Lovenox 40 mg daily.  Refused today however he took it yesterday.  Allergies reviewed.  He denies any allergies.      Interval hx:   9/11/2020: pt calm cooperative. On zyvox. Seen during VAC and two layer compression wrap change. Pt tolerating VAC and wraps. Wound decreased in size.   9/18/2020: Patient denies any fevers, chills, nausea, vomiting.  He is tolerating the veraflo wound VAC and 2 layer compression wrap.  Wound is decreasing in size.  Increased granular tissue noted, wound edges have improved however he does have rolled edges to popliteal fossa area.  Patient calm and cooperative, watching sports.  9/30/2020: Denies fevers, chills, nausea, vomiting.  Tolerating wound VAC and 2 layer compression wrap.  Refused nail care.  Wound " culture positive for strep A and Proteus.      Results:  Wound culture: 9/28/2020: Positive for beta-hemolytic strep group A, Proteus.  Sensitivities pending    Wound cx: 9/1/2020 mrsa, diphtheroids, beta hemolytic strep group A    8/7/2020: X-ray tib-fib left:  1.  Healed distal metaphyseal fractures of the left fibula and tibia with tibial IM layla in place.     2.  No acute fracture or dislocation.     3.  No radiopaque soft tissue foreign body.      Arterial studies:   9/2/2020  Right.    Doppler waveforms of the common femoral artery are of high amplitude and    triphasic.    Doppler waveforms at the ankle are brisk and triphasic.    Ankle-brachial index is normal.       Left.    Doppler waveforms of the common femoral artery are of high amplitude and    triphasic.    Doppler waveforms at the ankle are brisk and triphasic.    Ankle-brachial index is normal.    Unable to obtained popliteal waveforms due to wound bandages.        Exam:  Increased odor today, thick slough present to majority of wound bed despite veraflo VAC  Palpable PT pulse to left foot +1   +2 DP left foot  Difficulty palpating popliteal due to scar tissue  less scar tissue   rolled wound edges only to popliteal fossa, remaining wound edges have improved  Mild erythema to proximal aspect  Able to extend left leg to 170 degrees. Able to flex left leg around 80 degrees  Increased pain with palpation                    ASSESSMENT/PLAN:  64-year-old male with homelessness, EtOH use, tobacco use, and chronic left leg ulcer.  Wound culture positive for again hemolytic strep group A and now Proteus.  Patient with increased odor to wound.  Recommend antibiotics as patient wound has improved with antibiotics.      -continue veraflo VAC with 2 layer compression wrap. veraflo VAC to be continued while pt is admitted.    -Add Dakin's solution to instill through wound VAC.  Discontinue at next change.  -Does not need veraflo VAC at discharge.   -Wound culture  positive. Recommend restarting antibiotics and consultation with ID.  Discussed with Naye [facility] hospitalist RAFITA.  - Patient to continue to be seen by wound care team while admitted  Patient to continue to be followed by wound care nurse practitioner as he requires serial excisional debridements        Discharge plan:  Pending guardianship      D/W: Patient, RN, Sera wound care RN, Naye ELLER, Dr. Ellis

## 2020-09-30 NOTE — WOUND TEAM
[facility]  Inpatient Services   Wound and Skin Care Progress Note    Admission Date: 8/7/2020     Last order of IP CONSULT TO WOUND CARE was found on 9/1/2020 from Hospital Encounter on 8/7/2020     HPI, PMH, SH: Reviewed    Unit where seen by Wound Team: [id number]     WOUND CONSULT/FOLLOW UP RELATED TO:  Left leg scheduled NPWT change     Self Report / Pain Level:  Patient asleep in bed.     OBJECTIVE:  Patient sleeping in bed.     WOUND TYPE, LOCATION, CHARACTERISTICS (Pressure Injuries: location, stage, POA or date identified)      Negative Pressure Wound Therapy 09/03/20 Leg Lower;Medial;Posterior Left (Active)   NPWT Pump Mode / Pressure Setting Ulta;Continuous;125 mmHg 09/30/20 1200   Dressing Type Medium;Black Foam (Veraflo) 09/30/20 1200   Number of Foam Pieces Used 4 09/30/20 1200   Canister Changed Yes 09/29/20 2000   Output (mL) 500 mL 09/29/20 2000   NEXT Dressing Change/Treatment Date 10/02/20 09/30/20 1200   VAC VeraFlo Irrigant Normal Saline 09/30/20 1200   VAC VeraFlo Soak Time (mins) 6 09/30/20 1200   VAC VeraFlo Instill Volume (ml) 24 09/30/20 1200   VAC VeraFlo - Therapy Time (hrs) 1 09/30/20 1200   VAC VeraFlo Pressure (mm/Hg) Continuous;125 mmHg 09/30/20 1200   WOUND NURSE ONLY - Time Spent with Patient (mins) 60 09/30/20 1200           Wound 08/07/20 Full Thickness Wound Leg LLE posterior, extends medially and laterally (Active)   Wound Image     09/21/20 1200   Site Assessment Red 09/30/20 1200   Periwound Assessment Clean;Dry;Intact;Scar tissue 09/30/20 1200   Margins Attached edges;Defined edges 09/30/20 1200   Closure Secondary intention 09/30/20 1200   Drainage Amount Moderate 09/30/20 1200   Drainage Description Serosanguineous 09/30/20 1200   Treatments Cleansed;Irrigation;Site care 09/30/20 1200   Wound Cleansing Approved Wound Cleanser 09/30/20 1200   Periwound Protectant Benzoin;Paste Ring;Drape 09/30/20 1200   Dressing Cleansing/Solutions Normal Saline 09/30/20 1200    Dressing Options Wound Vac 20 1200   Dressing Changed Changed 20 1200   Dressing Status Clean;Dry;Intact 20 1200   Dressing Change/Treatment Frequency By Wound Team Only 20 1200   NEXT Dressing Change/Treatment Date 10/02/20 09/30/20 1200   NEXT Weekly Photo (Inpatient Only) 10/02/20 09/30/20 1200   Non-staged Wound Description Full thickness 20 1200   Wound Length (cm) 33 cm 20 1200   Wound Width (cm) 13.3 cm 20 1200   Wound Depth (cm) 0.3 cm 20 1200   Wound Surface Area (cm^2) 438.9 cm^2 20 1200   Wound Volume (cm^3) 131.67 cm^3 20 1200   Post-Procedure Length (cm) 33 cm 20 1200   Post-Procedure Width (cm) 13.3 cm 20 1200   Post-Procedure Depth (cm) 0.3 cm 20 1200   Post-Procedure Surface Area (cm^2) 438.9 cm^2 20 1200   Post-Procedure Volume (cm^3) 131.67 cm^3 20 1200   Wound Healing % -150 20 1200   Wound Bed Granulation (%) 100 % 20 1600   Wound Bed Epithelium (%) 0 % 20 1300   Wound Bed Slough (%) 50 % 20 1300   Wound Bed Granulation (%) - Post-Procedure 0 % 20 1300   Wound Bed Epithelium % - (Post-Procedure) 0 % 20 1300   Wound Bed Slough % - (Post-Procedure) 0 % 20 1300   Wound Bed Eschar % - (Post-Procedure) 0 % 20 1300   Tunneling (cm) 0 cm 20 1300   Undermining (cm) 0 cm 20 1300   Shape Irregular 20 1200   Wound Odor None 20 1200   Pulses Left;2+;DP;PT 20 1200   Exposed Structures None 20 1200   WOUND NURSE ONLY - Time Spent with Patient (mins) 60 20 0930          Vascular:    KOSTAS:   KOSTAS Results, Last 30 Days Us-kostas Single Level Bilat    Result Date: 2020  Narrative  Vascular Laboratory  Conclusions  1.  Normal bilateral KOSTAS's.  GRUPO GANN  Age:    64    Gender:     M  MRN:    [id number]  :    1955      BSA:  Exam Date:     2020 09:59  Room #:     Inpatient  Priority:     Routine  Ht (in):             Wt  (lb):  Ordering Physician:        EDDA CANADA  Referring Physician:       [id number]  Sonographer:               Deja Ellis RDMS                             RVT  Study Type:                Complete Bilateral  Technical Quality:         Adequate  Indications:     Ulceration of LE  CPT Codes:       65842  ICD Codes:       707.1  History:         Nonhealing wound of left lower extremity.  Limitations:                 RIGHT  Waveform            Systolic BPs (mmHg)                             117           Brachial  Triphasic                                Common Femoral  Triphasic                  138           Posterior Tibial  Triphasic                  132           Dorsalis Pedis                                           Peroneal                             1.18          KOSTAS                                           TBI                       LEFT  Waveform        Systolic BPs (mmHg)                             114           Brachial  Triphasic                                Common Femoral  Triphasic                  127           Posterior Tibial  Triphasic                  122           Dorsalis Pedis                                           Peroneal                             1.09          KOSTAS                                           TBI  Findings  Right.  Doppler waveforms of the common femoral artery are of high amplitude and  triphasic.  Doppler waveforms at the ankle are brisk and triphasic.  Ankle-brachial index is normal.  Left.  Doppler waveforms of the common femoral artery are of high amplitude and  triphasic.  Doppler waveforms at the ankle are brisk and triphasic.  Ankle-brachial index is normal.  Unable to obtained popliteal waveforms due to wound bandages.  Additional testing was not performed in accordance with lower extremity  arterial evaluation protocol.  Clover Maya  (Electronically Signed)  Final Date:       02 September 2020                   11:14    Lab Values:    Lab Results   Component Value Date/Time    WBC 6.5 09/03/2020 11:31 AM    RBC 3.82 (L) 09/03/2020 11:31 AM    HEMOGLOBIN 11.2 (L) 09/03/2020 11:31 AM    HEMATOCRIT 35.0 (L) 09/03/2020 11:31 AM    CREACTPROT 1.07 (H) 08/12/2020 01:55 PM    SEDRATEWES 85 (H) 08/07/2020 01:20 PM    HBA1C 5.7 (H) 11/09/2018 06:30 PM        Culture Results show:  Recent Results (from the past 720 hour(s))   CULTURE WOUND W/ GRAM STAIN    Collection Time: 09/01/20  2:00 PM    Specimen: Left Leg; Wound   Result Value Ref Range    Significant Indicator POS (POS)     Source WND     Site LEFT LEG     Culture Result - (A)     Gram Stain Result       Moderate Gram positive cocci.  Moderate Gram positive rods.      Culture Result (A)      Beta Hemolytic Streptococcus group A  Moderate growth      Culture Result (A)      Methicillin Resistant Staphylococcus aureus  Moderate growth      Culture Result (A)      Diphtheroids  Moderate growth  Mixed morphologies.         INTERVENTIONS BY WOUND TEAM:  Chart reviewed, patient seen with Dr. Ellis from Infectious disease, Asaf SOLOMON and Naye ELLER.  Patient pants removed and had patient lay on his stomach for dressing change.  2 layer compression wrap removed and left LE was washed with foam wash and warm washcloth, air dried.  Wound vac dressing removed and wound bed cleansed with wound cleanser and gauze.  Meredith-wound prepped with no sting and paste ring.  Black veraflo foam to wound bed, sealed with drape and TRAC pad + button applied to proximal wound bed at thigh.  Resumed Veraflo,veraflo settings 24mL for 6 minutes instillation hourly.  No leaks noted. 2 layer compression wrap applied to left LE.  Veraflo instillation to be changed to dakin's solution until next wound VAC change, change back to NS.     Interdisciplinary consultation: Patient, Bedside RN, Dr. Ellis, Naye ELLER, and Asaf ELLER      EVALUATION / RATIONALE FOR TREATMENT:    9/30: Patient to start abx therapy for 7 days course per Dr. Ellis.  Started dakin's instillation to veraflo  9/28: malodorous today, culture taken.    9/25: Odor noted, though unclear if from wound or pt, consider shower before next Vac change. Consider regular vac next change, wound granular and superficial.   9/23: Patient still awaiting guardianship for placement.   9/18: wound granulating nicely and responding well to current treatment.    9/16: Wound bed with granular tissue, edges are thick but appear better than previous assessments. . NPWT VF to encourage closure by secondary intention and manage drainage while encouraging oxygenation and granulation to the wound bed. 2 layer compression to assist in decrease of swelling and encourage blood flow to wounds. Wound team to follow up and change 3x/week.      Goals: Steady decrease in wound area and depth weekly.    NURSING PLAN OF CARE ORDERS (X):    Dressing changes: See Dressing Care orders: X  Skin care: See Skin Care orders:   Rectal tube care: See Rectal Tube Care orders:   Other orders:      WOUND TEAM PLAN OF CARE:   Dressing changes by wound team:                   Follow up 3 times weekly:                NPWT change 3 times weekly:   X  Follow up 1-2 times weekly:      Follow up Bi-Monthly:                   Follow up as needed:       Other (explain):     Anticipated discharge plans: pending guardianship.   LTACH:        SNF/Rehab: X - patient will need ongoing wound care for LLE upon discharge - 3x/week.                 Home Health Care:           Outpatient Wound Center:            Self Care:

## 2020-09-30 NOTE — CARE PLAN
Problem: Infection  Goal: Will remain free from infection  Outcome: PROGRESSING AS EXPECTED   Educate patient on proper hand washing techniques. Monitor for signs and symptoms of infection. Pt's wound vac is working adequately.   Problem: Pain Management  Goal: Pain level will decrease to patient's comfort goal  Outcome: PROGRESSING AS EXPECTED   Pt pain is well managed on oral medications. Continue to educate patient on non-pharmacological pain interventions.

## 2020-09-30 NOTE — PROGRESS NOTES
Hospital Medicine Twice Weekly Progress Note    Date of Service  9/30/2020    Chief Complaint  LLE Wound    Hospital Course   Mr. Varela is a 64-year-old male with PMH significant for homelessness, etoh use, tobacco dependence and chronic LLE wound who presented 8/7/2020 with LLE wound infection.  He was hospitalized at our facility in April and evaluated by orthopedic surgery who recommended wound care.  Wound cultures at that time grew MSSA and strep. Patient reported losing his Medicaid card and having no transportation to wound clinic.  He was seen at [facility] earlier this week and prescribed ABX, however he has not filled the prescription.  US LLE negative for DVT.  Treated for sepsis with empiric ABX and wound care. He completed ABX course 9/16. He was found to have head lice and underwent treatments. Continued to have intermittent agitation so was started on risperdol, depakote and gabapentin per psych recs.  He has been deemed incapacitated to make medical decisions and is currently pending guardianship and placement.  He is medically stable and will be only be evaluated 2 times weekly unless there is a clinical change. Repeat wound culture 9/28 grew Strep A and proteus.      Interval Problem Update  -discussed recent culture results with ID, Dr. Ellis who recommends 7 day course PO ABX. Patient afebrile with no indications of sepsis.  -seen and examined during dressing change. Wound foul smelling with bloody drainage.     Consultants/Specialty  Infectious Disease  Psychiatry    Code Status  Full Code    Disposition  Pending guardianship and placement - likely Group Home     Review of Systems  Review of Systems   Constitutional: Positive for malaise/fatigue. Negative for chills and fever.   HENT: Negative.    Eyes: Negative for blurred vision and double vision.   Respiratory: Positive for cough. Negative for shortness of breath.    Cardiovascular: Negative for chest pain and palpitations.  "  Gastrointestinal: Negative for nausea and vomiting.   Genitourinary: Negative for dysuria and urgency.   Musculoskeletal: Negative for falls and joint pain.   Skin:        LLE wound \"doesn't hurt that much\".   Neurological: Positive for headaches. Negative for dizziness.   Psychiatric/Behavioral: The patient is not nervous/anxious and does not have insomnia.    All other systems reviewed and are negative.       Physical Exam  Temp:  [36 °C (96.8 °F)-36.8 °C (98.3 °F)] 36.8 °C (98.3 °F)  Pulse:  [55-93] 93  Resp:  [16-18] 17  BP: (104-136)/(63-91) 106/74  SpO2:  [91 %-99 %] 95 %    Physical Exam  Vitals signs and nursing note reviewed. Exam conducted with a chaperone present.   Constitutional:       General: He is awake. He is not in acute distress.     Appearance: He is underweight. He is ill-appearing. He is not toxic-appearing or diaphoretic.   HENT:      Head: Normocephalic and atraumatic.      Nose: Nose normal.      Mouth/Throat:      Lips: Pink.      Mouth: Mucous membranes are moist.   Eyes:      General: No scleral icterus.     Conjunctiva/sclera: Conjunctivae normal.   Neck:      Musculoskeletal: Normal range of motion.   Cardiovascular:      Rate and Rhythm: Normal rate.   Pulmonary:      Effort: Pulmonary effort is normal.   Abdominal:      General: There is no distension.      Tenderness: There is no abdominal tenderness.   Musculoskeletal:      Right lower leg: No edema.      Left lower leg: No edema.   Skin:     General: Skin is warm and dry.      Comments: Left leg wound - foul smelling with bloody drainage   Neurological:      Mental Status: He is alert.      Gait: Gait normal.      Comments: Oriented x 2 (person place, thought it was 2008)  Confused intermittently   Psychiatric:         Mood and Affect: Mood and affect normal.         Behavior: Behavior is not aggressive or combative. Behavior is cooperative.         Cognition and Memory: Cognition is impaired.         Judgment: Judgment is " impulsive.      Comments:            Fluids    Intake/Output Summary (Last 24 hours) at 9/30/2020 1149  Last data filed at 9/30/2020 0800  Gross per 24 hour   Intake 1680 ml   Output 500 ml   Net 1180 ml       Laboratory  Recent Labs     09/30/20  0923   WBC 5.5   RBC 3.74*   HEMOGLOBIN 10.8*   HEMATOCRIT 32.6*   MCV 87.2   MCH 28.9   MCHC 33.1*   RDW 47.7   PLATELETCT 429   MPV 8.8*                       Imaging  US-KOSTAS SINGLE LEVEL BILAT   Final Result      CT-HEAD W/O   Final Result      No acute intracranial abnormality      DX-TIBIA AND FIBULA LEFT   Final Result      1.  Healed distal metaphyseal fractures of the left fibula and tibia with tibial IM layla in place.      2.  No acute fracture or dislocation.      3.  No radiopaque soft tissue foreign body.      DX-FEMUR-2+ LEFT   Final Result      1.  No radiographic evidence of acute traumatic injury left femur.      2.  Unchanged cortical thickening in the posterior aspect of the distal femoral diaphysis which may represent sequela of prior injury or infection.      3.  No soft tissue foreign body identified.      US-EXTREMITY VENOUS LOWER UNILAT LEFT   Final Result      DX-CHEST-PORTABLE (1 VIEW)   Final Result      No acute cardiopulmonary abnormality.           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   -blood cultures (-)   -afebrile. No Leukocytosis   -LPS following for serial debridements; no need for Vac at dc  -Cx 9/1 Strep, MRSA, diptheroids: completed Zyvox 9/2 thru 9/16  -cx 9/28 strep A and proteus - ID consulted and recommended 7 day PO ABX course    Encephalopathy- (present on admission)  Assessment & Plan  -acute on chronic, likely due Wernicke's   -Psychiatry: incapacitated to make medical decision or leave AMA   -Requires guardianship and placement  -Avoid narcotics and hypnotics.   Cautious use of benzodiazepines for alcohol withdrawal protocol  -Frequent reorientation  -Sleep hygiene    Non-compliance  Assessment & Plan  -likely also component of psychiatric disorder and ETOH abuse  -Psychiatric consulted and suggests patient is incapacitated to make medical decisions or leave AMA  -ongoing encouragement for compliance.    Psychiatric disorder  Assessment & Plan  -unknown/unspecified  -psychiatry consulted and deemed him incapacitated to leave AMA or make medical decisions  -continue risperdol 0.5 -1mg BID, cont depakote 125mg TID added neurotin 100mg TID 8/16  -Pending guardianship. Application submitted 9/3.    Flexion contracture of knee, 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)  Assessment & Plan  -secondary to chronic wound in that area  -PT OT  -encourage mobility    Emphysema/COPD (HCC)  Assessment & Plan  Stable. RA. Chronic w/o exacerbation    Head lice  Assessment & Plan  -he has received 3 treatments  -now resolved      Alcohol dependence (HCC)- (present on admission)  Assessment & Plan  -history of. Cont MV    Protein malnutrition (HCC)  Assessment & Plan  -history of ETOH and homelessness  -Body mass index is 20.33 kg/m².  -TID supplements  -encourage PO intake    Tobacco dependence- (present on admission)  Assessment & Plan  -Nicotine replacement protocol       VTE prophylaxis: Ambulatory    I have performed a physical exam and reviewed and updated ROS and Assessment/Plan today (09/30/20). In review of the previous note there are no changes except as documented above    I certify the patient requires continued medically necessary hospital services for the treatment of non-healing wound.  The patient will remain in the hospital for the foreseeable future.  Discharge may or may not occur in the next 20 days due to ongoing discharge delays due to having no medically acceptable discharge options.    Please note that this dictation was created using voice recognition software. I have made every  reasonable attempt to correct obvious errors, but there may be errors of grammar and possibly content that I did not discover before finalizing the note.    LOUISE Khan.

## 2020-09-30 NOTE — CARE PLAN
Problem: Safety  Goal: Will remain free from falls  Outcome: PROGRESSING AS EXPECTED     Problem: Infection  Goal: Will remain free from infection  Outcome: PROGRESSING AS EXPECTED     Problem: Venous Thromboembolism (VTW)/Deep Vein Thrombosis (DVT) Prevention:  Goal: Patient will participate in Venous Thrombosis (VTE)/Deep Vein Thrombosis (DVT)Prevention Measures  Outcome: PROGRESSING AS EXPECTED     Problem: Bowel/Gastric:  Goal: Normal bowel function is maintained or improved  Outcome: PROGRESSING AS EXPECTED  Goal: Will not experience complications related to bowel motility  Outcome: PROGRESSING AS EXPECTED     Problem: Fluid Volume:  Goal: Will maintain balanced intake and output  Outcome: PROGRESSING AS EXPECTED     Problem: Pain Management  Goal: Pain level will decrease to patient's comfort goal  Outcome: PROGRESSING AS EXPECTED     Problem: Mobility  Goal: Risk for activity intolerance will decrease  Outcome: PROGRESSING AS EXPECTED     Problem: Respiratory:  Goal: Respiratory status will improve  Outcome: PROGRESSING AS EXPECTED

## 2020-09-30 NOTE — DISCHARGE PLANNING
Anticipated Discharge Disposition: Gaurdianship with Group Home     Action: pt pending public guardianship, pt has a sitter due to high flight risk per nursing.     Barriers to Discharge: guardianship, group home placement     Plan: f/u with medical team, Teri Madrigal

## 2020-10-01 ENCOUNTER — APPOINTMENT (OUTPATIENT)
Dept: RADIOLOGY | Facility: MEDICAL CENTER | Age: 65
DRG: 853 | End: 2020-10-01
Attending: NURSE PRACTITIONER
Payer: MEDICARE

## 2020-10-01 PROCEDURE — 700102 HCHG RX REV CODE 250 W/ 637 OVERRIDE(OP): Performed by: NURSE PRACTITIONER

## 2020-10-01 PROCEDURE — A9270 NON-COVERED ITEM OR SERVICE: HCPCS | Performed by: NURSE PRACTITIONER

## 2020-10-01 PROCEDURE — 770021 HCHG ROOM/CARE - ISO PRIVATE

## 2020-10-01 PROCEDURE — 700111 HCHG RX REV CODE 636 W/ 250 OVERRIDE (IP): Performed by: STUDENT IN AN ORGANIZED HEALTH CARE EDUCATION/TRAINING PROGRAM

## 2020-10-01 PROCEDURE — 05HA33Z INSERTION OF INFUSION DEVICE INTO LEFT BRACHIAL VEIN, PERCUTANEOUS APPROACH: ICD-10-PCS | Performed by: INTERNAL MEDICINE

## 2020-10-01 PROCEDURE — 700101 HCHG RX REV CODE 250: Performed by: NURSE PRACTITIONER

## 2020-10-01 PROCEDURE — 700105 HCHG RX REV CODE 258: Performed by: STUDENT IN AN ORGANIZED HEALTH CARE EDUCATION/TRAINING PROGRAM

## 2020-10-01 PROCEDURE — 76937 US GUIDE VASCULAR ACCESS: CPT

## 2020-10-01 RX ADMIN — Medication 400 MG: at 17:58

## 2020-10-01 RX ADMIN — GABAPENTIN 100 MG: 100 CAPSULE ORAL at 17:58

## 2020-10-01 RX ADMIN — RISPERIDONE 1 MG: 1 TABLET ORAL at 17:58

## 2020-10-01 RX ADMIN — OXYCODONE HYDROCHLORIDE 5 MG: 5 TABLET ORAL at 11:34

## 2020-10-01 RX ADMIN — PIPERACILLIN AND TAZOBACTAM 3.38 G: 3; .375 INJECTION, POWDER, LYOPHILIZED, FOR SOLUTION INTRAVENOUS; PARENTERAL at 11:35

## 2020-10-01 RX ADMIN — PIPERACILLIN AND TAZOBACTAM 3.38 G: 3; .375 INJECTION, POWDER, LYOPHILIZED, FOR SOLUTION INTRAVENOUS; PARENTERAL at 21:21

## 2020-10-01 RX ADMIN — OXYCODONE HYDROCHLORIDE 5 MG: 5 TABLET ORAL at 18:19

## 2020-10-01 RX ADMIN — GABAPENTIN 100 MG: 100 CAPSULE ORAL at 11:34

## 2020-10-01 RX ADMIN — SODIUM HYPOCHLORITE 473 ML: 1.25 SOLUTION TOPICAL at 11:33

## 2020-10-01 RX ADMIN — OXYCODONE HYDROCHLORIDE 5 MG: 5 TABLET ORAL at 21:21

## 2020-10-01 RX ADMIN — DIVALPROEX SODIUM 125 MG: 125 CAPSULE ORAL at 21:21

## 2020-10-01 RX ADMIN — DIVALPROEX SODIUM 125 MG: 125 CAPSULE ORAL at 15:03

## 2020-10-01 RX ADMIN — OXYCODONE HYDROCHLORIDE 5 MG: 5 TABLET ORAL at 15:03

## 2020-10-01 ASSESSMENT — ENCOUNTER SYMPTOMS
ABDOMINAL PAIN: 0
VOMITING: 0
FEVER: 0
DOUBLE VISION: 0
HEADACHES: 0
SHORTNESS OF BREATH: 0
NAUSEA: 0
CHILLS: 0
DIARRHEA: 0
MYALGIAS: 1
CONSTIPATION: 0
SPUTUM PRODUCTION: 0
BLURRED VISION: 0
NERVOUS/ANXIOUS: 0
COUGH: 0
FOCAL WEAKNESS: 0

## 2020-10-01 ASSESSMENT — PAIN DESCRIPTION - PAIN TYPE
TYPE: ACUTE PAIN
TYPE: ACUTE PAIN
TYPE: CHRONIC PAIN
TYPE: ACUTE PAIN

## 2020-10-01 NOTE — PROCEDURES
Vascular Access Team    Date of Insertion: 910/1/2020  Arm Circumference: n/a  Line Length: 18  Line Size: 10  Vein Occupancy %: 28  Reason for Midline: access  Labs: on 9/30/2020 WBC 5.5, , on 9/11/2020 BUN 21, Cr 0.90, GFR >60, INR na    Orders confirmed, vessel patency confirmed with ultrasound. Risks and benefits of procedure explained to patient and education regarding line associated bloodstream infections provided. Questions answered.     PowerGlide Midline placed in LUE per licensed provider order with ultrasound guidance. 18g, 10 cm line placed in brachial vein after 1 attempt(s).  Catheter inserted with brisk blood return. Secured with 0cm external from insertion site.  Line flushed without resistance with 10 mL 0.9% normal saline.  Midline secured with Biopatch and Tegaderm.     Midline placement is confirmed by nurse using ultrasound and ability to flush and draw blood. Midline is appropriate for use at this time.  No X-ray is needed for placement confirmation. Pt tolerated procedure well.  Patient condition relayed to unit RN or ordering physician via this post procedure note in the EMR.    Ultrasound images uploaded to PACS and viewable in the EMR - yes  Ultrasound imaged printed and placed in paper chart - no      BARD PowerGlide Midline ref # [id number], Lot # [id number], Expiration Date 05/31/2021

## 2020-10-01 NOTE — PROGRESS NOTES
Pt's wound vac suction pad dislodged when pt ambulated to the bathroom. Ordered a new set of tubing and Vac trac cap to replace wound vac and get it to work - instructions followed per wound care order. Also replaced a new cannister. Checked for seal and leak; wound vac is working and suctioning. Re-educated pt on how to ambulate safely and appropriately with the vac.

## 2020-10-01 NOTE — CARE PLAN
Problem: Safety  Goal: Will remain free from falls  Outcome: PROGRESSING AS EXPECTED  Note: Educated pt to use call button to call for help. Bed rails up x2. Provided hospital non-slip socks. Bed locked and at the lowest position. Call light and personal belongings within reach. Safety sitter in place.     Problem: Pain Management  Goal: Pain level will decrease to patient's comfort goal  Outcome: PROGRESSING AS EXPECTED  Note:   Discussed pt's desired comfort goal. Educated on pharm/non-pharm measures of preventing pain. Verbalized understanding. Pt has been within comfort goal.

## 2020-10-01 NOTE — CARE PLAN
Problem: Knowledge Deficit  Goal: Knowledge of the prescribed therapeutic regimen will improve  Outcome: PROGRESSING SLOWER THAN EXPECTED     Problem: Discharge Barriers/Planning  Goal: Patient's continuum of care needs will be met  Outcome: PROGRESSING SLOWER THAN EXPECTED     Problem: Psychosocial Needs:  Goal: Level of anxiety will decrease  Outcome: PROGRESSING SLOWER THAN EXPECTED       Problem: Infection  Goal: Will remain free from infection  Outcome: PROGRESSING AS EXPECTED  IV abx in use     Problem: Pain Management  Goal: Pain level will decrease to patient's comfort goal  Outcome: PROGRESSING AS EXPECTED   PRN pain meds in use

## 2020-10-01 NOTE — PROGRESS NOTES
Walked in to administer meds and hang up IV abx to see that pt has pulled out his IV. This is the second IV he has pulled out on this shift.    He is refusing to let us star a new one at this time and wants us to let him sleep.    Pt also refusing vitals at this time. Attempted reeducation but pt got more agitated.

## 2020-10-01 NOTE — WOUND TEAM
Wound team in to check on alarming VAC.  Ran VAC through test cycle and went through instillation and suction phases. VAC was running on the suction phase for 1 hour when WT left the room.  Spoke to  RN and he will call if any problems occur with the VAC.

## 2020-10-01 NOTE — CONSULTS
INFECTIOUS DISEASES INPATIENT CONSULT NOTE     Date of Service: 10/1/2020    Consult Requested By: FELIPE Khan    Reason for Consultation: Chronic wound infection     History of Present Illness:   Bola Varela is a 65 y.o.  admitted 8/7/2020. Pt has a past medical history of cognitive impairment, alcoholism, tobacco abuse and homelessness.  He has a chronic wound on medial left leg initially sustained 5/2018 when he had a fall in the [facility] area.  The wound worsened and he initially underwent debridement for necrotizing fasciitis at [facility] in 5/2018.  He was admitted over a month ago due to concern for left lower extremity wound infection with worsening erythema, drainage and pain.  He initially had a leukocytosis.  Wound cultures early this admission (8/7/20) grew MSSA and GIS.  He also was infected with lice.   Repeat wound cultures from 9/1/2020 with group A strep, MSSA and diphtheroids.  The patient was treated with a course of linezolid from 9/2 through 9/16 and then antibiotics were stopped.  Per wound care team the wound has been improving overall. ID has been reconsulted as the wound appeared to be worsening again with increased slough, drainage and odor.  Wound cultures from 9/28 with both of group A strep and Proteus vulgaris.  Unfortunately the Proteus is fairly resistant.  The patient has poor compliance and minimal understanding of his disease process.  He has refused IV for IV antibiotics but has been convinced to start them as limited oral options are available.    Review Of Systems:  Review of Systems   Constitutional: Negative for chills, fever and malaise/fatigue.   HENT: Negative for hearing loss and tinnitus.    Eyes: Negative for blurred vision and double vision.   Respiratory: Negative for cough, sputum production and shortness of breath.    Cardiovascular: Negative for chest pain and leg swelling.   Gastrointestinal: Negative for abdominal pain, constipation,  diarrhea, nausea and vomiting.   Genitourinary: Negative for dysuria and hematuria.   Musculoskeletal: Positive for myalgias.   Skin: Negative for rash.   Neurological: Negative for focal weakness and headaches.   Psychiatric/Behavioral: The patient is not nervous/anxious.          PMH:   Past Medical History:   Diagnosis Date   • Psychiatric problem    • Restless leg    • Tobacco use        PSH:  Past Surgical History:   Procedure Laterality Date   • ANKLE ORIF Right        FAMILY HX:  Family History   Problem Relation Age of Onset   • Heart Disease Mother    • No Known Problems Father    • Cancer Neg Hx    • Diabetes Neg Hx    • Stroke Neg Hx        SOCIAL HX:  Social History     Socioeconomic History   • Marital status: Single     Spouse name: Not on file   • Number of children: Not on file   • Years of education: Not on file   • Highest education level: Not on file   Occupational History   • Not on file   Social Needs   • Financial resource strain: Not on file   • Food insecurity     Worry: Never true     Inability: Never true   • Transportation needs     Medical: No     Non-medical: No   Tobacco Use   • Smoking status: Light Tobacco Smoker     Packs/day: 0.00     Years: 40.00     Pack years: 0.00     Types: Cigarettes   • Smokeless tobacco: Never Used   • Tobacco comment: 1 ppd until few years ago   Substance and Sexual Activity   • Alcohol use: Yes     Frequency: 2-4 times a month     Drinks per session: 3 or 4     Binge frequency: Never     Comment: occasional    • Drug use: No     Types: Marijuana   • Sexual activity: Not Currently     Partners: Female   Lifestyle   • Physical activity     Days per week: Not on file     Minutes per session: Not on file   • Stress: Not on file   Relationships   • Social connections     Talks on phone: Not on file     Gets together: Not on file     Attends Zoroastrianism service: Not on file     Active member of club or organization: Not on file     Attends meetings of clubs or  organizations: Not on file     Relationship status: Not on file   • Intimate partner violence     Fear of current or ex partner: Not on file     Emotionally abused: Not on file     Physically abused: Not on file     Forced sexual activity: Not on file   Other Topics Concern   • Not on file   Social History Narrative   • Not on file     Social History     Tobacco Use   Smoking Status Light Tobacco Smoker   • Packs/day: 0.00   • Years: 40.00   • Pack years: 0.00   • Types: Cigarettes   Smokeless Tobacco Never Used   Tobacco Comment    1 ppd until few years ago     Social History     Substance and Sexual Activity   Alcohol Use Yes   • Frequency: 2-4 times a month   • Drinks per session: 3 or 4   • Binge frequency: Never    Comment: occasional        Allergies/Intolerances:  No Known Allergies    History reviewed with the patient, per chart and with wound care team.    Other Current Medications:    Current Facility-Administered Medications:   •  piperacillin-tazobactam (ZOSYN) 3.375 g in  mL IVPB, 3.375 g, Intravenous, Once **AND** piperacillin-tazobactam (ZOSYN) 3.375 g in  mL IVPB, 3.375 g, Intravenous, Q8HRS, Anisha Latham M.D., Stopped at 10/01/20 0240  •  dakins 0.125% (1/4 strength) topical soln, , Topical, DAILY, FELIPE Khan, 1 mL at 09/30/20 1843  •  Notify provider if pain remains uncontrolled, , , CONTINUOUS **AND** Use the numeric rating scale (NRS-11) on regular floors and Critical-Care Pain Observation Tool (CPOT) on ICUs/Trauma to assess pain, , , CONTINUOUS **AND** Pulse Ox (Oximetry), , , CONTINUOUS **AND** [DISCONTINUED] Pharmacy Consult Request ...Pain Management Review 1 Each, 1 Each, Other, PHARMACY TO DOSE **AND** If patient difficult to arouse and/or has respiratory depression, stop any opiates that are currently infusing and call a Rapid Response., , , CONTINUOUS **AND** oxyCODONE immediate-release (ROXICODONE) tablet 2.5 mg, 2.5 mg, Oral, Q3HRS PRN, Stopped at  "20 0755 **AND** oxyCODONE immediate-release (ROXICODONE) tablet 5 mg, 5 mg, Oral, Q3HRS PRN, 5 mg at 20 2242 **AND** [DISCONTINUED] HYDROmorphone pf (DILAUDID) injection 0.25 mg, 0.25 mg, Intravenous, Q3HRS PRN, London Alvarado M.D., 0.25 mg at 20 2211  •  senna-docusate (PERICOLACE or SENOKOT S) 8.6-50 MG per tablet 2 Tab, 2 Tab, Oral, BID PRN **AND** polyethylene glycol/lytes (MIRALAX) PACKET 1 Packet, 1 Packet, Oral, QDAY PRN, 1 Packet at 20 0508 **AND** [DISCONTINUED] magnesium hydroxide (MILK OF MAGNESIA) suspension 30 mL, 30 mL, Oral, QDAY PRN **AND** [DISCONTINUED] bisacodyl (DULCOLAX) suppository 10 mg, 10 mg, Rectal, QDAY PRN, Demarcus QUINTANA Olde, A.P.N.  •  risperiDONE (RISPERDAL) tablet 0.5 mg, 0.5 mg, Oral, DAILY, Demarcus QUINTANA Olde, A.P.N., Stopped at 10/01/20 0600  •  risperiDONE (RISPERDAL) tablet 1 mg, 1 mg, Oral, Q EVENING, Demarcus QUINTANA Olde, A.P.N., 1 mg at 20 1840  •  haloperidol lactate (HALDOL) injection 2-5 mg, 2-5 mg, Intramuscular, Q4HRS PRN, Demarcus QUINTANA Olde, A.P.N.  •  gabapentin (NEURONTIN) capsule 100 mg, 100 mg, Oral, TID, Demarcus QUINTANA Olde, A.P.N., Stopped at 10/01/20 0600  •  divalproex (DEPAKOTE SPRINKLE) capsule 125 mg, 125 mg, Oral, Q8HRS, Demarcus QUINTANA Olde, A.P.N., Stopped at 10/01/20 0600  •  magnesium oxide (MAG-OX) tablet 400 mg, 400 mg, Oral, BID, Demarcus Mccabe, A.P.N., Stopped at 10/01/20 0600  •  amLODIPine (NORVASC) tablet 5 mg, 5 mg, Oral, Q DAY, Demarcus Mccabe, A.P.N., Stopped at 10/01/20 0600  •  acetaminophen (TYLENOL) tablet 650 mg, 650 mg, Oral, Q6HRS PRN, Demarcus Mccabe, A.P.N., 650 mg at 20  [unfilled]    Most Recent Vital Signs:  /64   Pulse 93   Temp 36.4 °C (97.5 °F) (Temporal)   Resp 17   Ht 1.803 m (5' 10.98\")   Wt 66.1 kg (145 lb 11.6 oz)   SpO2 95%   BMI 20.33 kg/m²   Temp  Av.6 °C (97.9 °F)  Min: 35.9 °C (96.6 °F)  Max: 37.5 °C (99.5 °F)    Physical Exam:  Physical Exam   Constitutional: He is oriented to person, place, and " time and well-developed, well-nourished, and in no distress. No distress.   HENT:   Head: Normocephalic and atraumatic.   Right Ear: External ear normal.   Left Ear: External ear normal.   Nose: Nose normal.   Mouth/Throat: Oropharynx is clear and moist.   Eyes: Pupils are equal, round, and reactive to light. Conjunctivae and EOM are normal.   Neck: Normal range of motion. Neck supple.   Cardiovascular: Normal rate, regular rhythm and normal heart sounds.   Pulmonary/Chest: Effort normal and breath sounds normal.   Abdominal: Soft. Bowel sounds are normal.   Musculoskeletal: Normal range of motion.         General: Tenderness present. No edema.   Neurological: He is alert and oriented to person, place, and time.   Skin: Skin is warm and dry. He is not diaphoretic.   Left leg with full-thickness wound, granulation tissue under yellow slough, odor noted.  No surrounding tissue cellulitis noted.   Psychiatric:   Mostly labile and has somewhat irrational         Pertinent Lab Results:  Recent Labs     09/30/20 0923   WBC 5.5      Recent Labs     09/30/20 0923   HEMOGLOBIN 10.8*   HEMATOCRIT 32.6*   MCV 87.2   MCH 28.9   PLATELETCT 429         No results for input(s): SODIUM, POTASSIUM, CHLORIDE, CO2, CREATININE in the last 72 hours.    Invalid input(s): UREANITROGEN, EGFR, GULCOSE     No results for input(s): ALBUMIN in the last 72 hours.    Invalid input(s): AST, ALT, ALKPHOS, BILITOT, TOTALBILIRUB, BILIRUBINTOT, BILIRUBINDIR, BILIRUBININD, ALKALINEPHOS     Pertinent Micro:  Results     Procedure Component Value Units Date/Time    CULTURE WOUND W/ GRAM STAIN [[id number]]  (Abnormal)  (Susceptibility) Collected: 09/28/20 0950    Order Status: Completed Specimen: Wound from Left Leg Updated: 09/30/20 0957     Significant Indicator POS     Source WND     Site LEFT LEG     Culture Result Rare growth mixed skin lotus.     Gram Stain Result Rare WBCs.  Rare Gram positive cocci.       Culture Result Beta Hemolytic  Streptococcus group A  Moderate growth        Proteus vulgaris  Light growth      Narrative:      [id number] VIOLETTE DAVID JACOBS.  [id number] VIOLETTE LIAOADRIEL JACOBS.    Susceptibility     Proteus vulgaris (2)     Antibiotic Interpretation Microscan Method Status    Ampicillin Resistant >16 mcg/mL CATHERINE Preliminary    Ceftriaxone Resistant 32 mcg/mL CATHERINE Preliminary    Ceftazidime Intermediate 16 mcg/mL CATHERINE Preliminary    Cefotaxime Intermediate 16 mcg/mL CATHERINE Preliminary    Cefazolin Resistant >16 mcg/mL CATHERINE Preliminary    Ciprofloxacin Resistant >2 mcg/mL CATHERINE Preliminary    Ampicillin/sulbactam Resistant >16/8 mcg/mL CATHERINE Preliminary    Cefepime Sensitive <=2 mcg/mL CATHERINE Preliminary    Tobramycin Sensitive <=4 mcg/mL CATHERINE Preliminary    Cefotetan Sensitive <=16 mcg/mL CATHERINE Preliminary    Ertapenem Sensitive <=0.5 mcg/mL CATHERINE Preliminary    Gentamicin Resistant >8 mcg/mL CATHERINE Preliminary    Pip/Tazobactam Sensitive <=16 mcg/mL CATHERINE Preliminary    Trimeth/Sulfa Resistant >2/38 mcg/mL CATHERINE Preliminary                   GRAM STAIN [[id number]] Collected: 20 0950    Order Status: Completed Specimen: Wound Updated: 20 1318     Significant Indicator .     Source WND     Site LEFT LEG     Gram Stain Result Rare WBCs.  Rare Gram positive cocci.      Narrative:      [id number] VIOLETTE JACOBS.  [id number] VIOLETTE JACOBS.        Blood Culture   Date Value Ref Range Status   2018 No growth after 5 days of incubation.  Final        Studies:  Us-cesar Single Level Bilat    Result Date: 2020   Vascular Laboratory  Conclusions  1.  Normal bilateral CESAR's.  GRUPO GANN  Age:    64    Gender:     M  MRN:    [id number]  :    1955      BSA:  Exam Date:     2020 09:59  Room #:     Inpatient  Priority:     Routine  Ht (in):             Wt (lb):  Ordering Physician:        EDDA CANADA  Referring Physician:       [id number] Morrissey  Sonographer:                Deja Ellis [facility]                             RVT  Study Type:                Complete Bilateral  Technical Quality:         Adequate  Indications:     Ulceration of LE  CPT Codes:       13873  ICD Codes:       707.1  History:         Nonhealing wound of left lower extremity.  Limitations:                 RIGHT  Waveform            Systolic BPs (mmHg)                             117           Brachial  Triphasic                                Common Femoral  Triphasic                  138           Posterior Tibial  Triphasic                  132           Dorsalis Pedis                                           Peroneal                             1.18          KOSTAS                                           TBI                       LEFT  Waveform        Systolic BPs (mmHg)                             114           Brachial  Triphasic                                Common Femoral  Triphasic                  127           Posterior Tibial  Triphasic                  122           Dorsalis Pedis                                           Peroneal                             1.09          KOSTAS                                           TBI  Findings  Right.  Doppler waveforms of the common femoral artery are of high amplitude and  triphasic.  Doppler waveforms at the ankle are brisk and triphasic.  Ankle-brachial index is normal.  Left.  Doppler waveforms of the common femoral artery are of high amplitude and  triphasic.  Doppler waveforms at the ankle are brisk and triphasic.  Ankle-brachial index is normal.  Unable to obtained popliteal waveforms due to wound bandages.  Additional testing was not performed in accordance with lower extremity  arterial evaluation protocol.  Clover Maya  (Electronically Signed)  Final Date:      02 September 2020                   11:14    Ir-midline Catheter Insertion Wo Guidance > Age 3    Result Date: 10/1/2020  HISTORY/REASON FOR EXAM:  Midline Placement    TECHNIQUE/EXAM DESCRIPTION AND NUMBER OF VIEWS: Midline insertion with ultrasound guidance.  FINDINGS: Midline insertion with Ultrasound Guidance was performed by qualified nursing staff without the assistance of a Radiologist. Midline positioning as measured by RN or as appropriate length of catheter selected.              Ultrasound-guided midline placement performed by qualified nursing staff as above.       ASSESSMENT/PLAN:     65 y.o.  admitted 8/7/2020. Pt has a past medical history of cognitive impairment, alcoholism, tobacco abuse and homelessness.  He has a chronic wound on medial left leg initially sustained 5/2018.  He was admitted over a month ago due to concern for left lower extremity wound infection with worsening erythema, drainage and pain.  He initially had a leukocytosis.  Wound cultures early this admission (8/7/20) grew MSSA and GIS.  He also was infected with lice.   Repeat wound cultures from 9/1/2020 with group A strep, MSSA and diphtheroids.  The patient was treated with a course of linezolid from 9/2 through 9/16 and then antibiotics were stopped.  Per wound care team the wound has been improving overall. ID has been reconsulted as the wound appeared to be worsening again with increased slough, drainage and odor.  Wound cultures from 9/28 with both of group A strep and Proteus vulgaris.  Unfortunate the Proteus is fairly resistant.      --- Infection appears to be superficial so will give short course and target the most recently grown organisms.  Unfortunately the Proteus is fairly resistant so no oral options are available.  Started Zosyn for planned 5-day course (end 10/5/20)   --- Monitor labs  --- Patient may need repeat discussion so that he understands the reason for his antibiotics and to encourage compliance  --- Patient initially refused IV but has now agreed after lengthy discussion with myself    ID will sign off.  Please reconsult if any new questions or concerns.      Plan of  care discussed with FELIPE Morse.  ID will sign off.    Marla Ellis M.D.

## 2020-10-02 PROCEDURE — A9270 NON-COVERED ITEM OR SERVICE: HCPCS | Performed by: NURSE PRACTITIONER

## 2020-10-02 PROCEDURE — 700102 HCHG RX REV CODE 250 W/ 637 OVERRIDE(OP): Performed by: NURSE PRACTITIONER

## 2020-10-02 PROCEDURE — 302098 PASTE RING (FLAT): Performed by: NURSE PRACTITIONER

## 2020-10-02 PROCEDURE — 700105 HCHG RX REV CODE 258: Performed by: STUDENT IN AN ORGANIZED HEALTH CARE EDUCATION/TRAINING PROGRAM

## 2020-10-02 PROCEDURE — 97606 NEG PRS WND THER DME>50 SQCM: CPT

## 2020-10-02 PROCEDURE — 700111 HCHG RX REV CODE 636 W/ 250 OVERRIDE (IP): Performed by: STUDENT IN AN ORGANIZED HEALTH CARE EDUCATION/TRAINING PROGRAM

## 2020-10-02 PROCEDURE — 770021 HCHG ROOM/CARE - ISO PRIVATE

## 2020-10-02 PROCEDURE — 700105 HCHG RX REV CODE 258

## 2020-10-02 RX ORDER — SODIUM CHLORIDE 9 MG/ML
INJECTION, SOLUTION INTRAVENOUS
Status: COMPLETED
Start: 2020-10-02 | End: 2020-10-02

## 2020-10-02 RX ADMIN — GABAPENTIN 100 MG: 100 CAPSULE ORAL at 05:49

## 2020-10-02 RX ADMIN — PIPERACILLIN AND TAZOBACTAM 3.38 G: 3; .375 INJECTION, POWDER, LYOPHILIZED, FOR SOLUTION INTRAVENOUS; PARENTERAL at 05:49

## 2020-10-02 RX ADMIN — DIVALPROEX SODIUM 125 MG: 125 CAPSULE ORAL at 05:50

## 2020-10-02 RX ADMIN — OXYCODONE HYDROCHLORIDE 5 MG: 5 TABLET ORAL at 00:49

## 2020-10-02 RX ADMIN — RISPERIDONE 1 MG: 1 TABLET ORAL at 17:36

## 2020-10-02 RX ADMIN — GABAPENTIN 100 MG: 100 CAPSULE ORAL at 13:02

## 2020-10-02 RX ADMIN — RISPERIDONE 0.5 MG: 0.5 TABLET ORAL at 05:50

## 2020-10-02 RX ADMIN — OXYCODONE HYDROCHLORIDE 5 MG: 5 TABLET ORAL at 06:17

## 2020-10-02 RX ADMIN — OXYCODONE HYDROCHLORIDE 5 MG: 5 TABLET ORAL at 17:37

## 2020-10-02 RX ADMIN — OXYCODONE HYDROCHLORIDE 5 MG: 5 TABLET ORAL at 21:14

## 2020-10-02 RX ADMIN — DIVALPROEX SODIUM 125 MG: 125 CAPSULE ORAL at 21:14

## 2020-10-02 RX ADMIN — GABAPENTIN 100 MG: 100 CAPSULE ORAL at 17:36

## 2020-10-02 RX ADMIN — PIPERACILLIN AND TAZOBACTAM 3.38 G: 3; .375 INJECTION, POWDER, LYOPHILIZED, FOR SOLUTION INTRAVENOUS; PARENTERAL at 21:14

## 2020-10-02 RX ADMIN — Medication 400 MG: at 17:36

## 2020-10-02 RX ADMIN — PIPERACILLIN AND TAZOBACTAM 3.38 G: 3; .375 INJECTION, POWDER, LYOPHILIZED, FOR SOLUTION INTRAVENOUS; PARENTERAL at 13:02

## 2020-10-02 RX ADMIN — OXYCODONE HYDROCHLORIDE 5 MG: 5 TABLET ORAL at 10:30

## 2020-10-02 RX ADMIN — Medication 400 MG: at 05:49

## 2020-10-02 RX ADMIN — AMLODIPINE BESYLATE 5 MG: 5 TABLET ORAL at 05:54

## 2020-10-02 RX ADMIN — OXYCODONE HYDROCHLORIDE 5 MG: 5 TABLET ORAL at 14:15

## 2020-10-02 RX ADMIN — SODIUM CHLORIDE 1000 ML: 9 INJECTION, SOLUTION INTRAVENOUS at 11:35

## 2020-10-02 RX ADMIN — DIVALPROEX SODIUM 125 MG: 125 CAPSULE ORAL at 14:15

## 2020-10-02 ASSESSMENT — PAIN DESCRIPTION - PAIN TYPE
TYPE: ACUTE PAIN
TYPE: CHRONIC PAIN
TYPE: CHRONIC PAIN;ACUTE PAIN
TYPE: CHRONIC PAIN

## 2020-10-02 NOTE — CARE PLAN
Problem: Pain Management  Goal: Pain level will decrease to patient's comfort goal  Outcome: PROGRESSING AS EXPECTED   Patient receiving oxy 5 PRN for pain management of LLE. Patient requests medication as needed.     Problem: Mobility  Goal: Risk for activity intolerance will decrease  Outcome: PROGRESSING AS EXPECTED   Patient ambulates around room frequently, walking to bathroom when needed.

## 2020-10-02 NOTE — WOUND TEAM
[facility]  Inpatient Services   Wound and Skin Care Progress Note    Admission Date: 8/7/2020     Last order of IP CONSULT TO WOUND CARE was found on 9/1/2020 from Hospital Encounter on 8/7/2020     HPI, PMH, SH: Reviewed    Unit where seen by Wound Team: [id number]     WOUND CONSULT/FOLLOW UP RELATED TO:  Left leg scheduled NPWT change     Self Report / Pain Level:  States 10/10 with oral pre-medication per nursing    OBJECTIVE:  Vac dressing in place with 2 layer compression.    WOUND TYPE, LOCATION, CHARACTERISTICS (Pressure Injuries: location, stage, POA or date identified)       Negative Pressure Wound Therapy 09/03/20 Leg Lower;Medial;Posterior Left (Active)   Dressing Type Medium;Black Foam (Veraflo)    Number of Foam Pieces Used 5    Canister Changed Yes    Output (mL) 450 mL    NEXT Dressing Change/Treatment Date 10/05/20    VAC VeraFlo Irrigant Normal Saline    VAC VeraFlo Soak Time (mins) 6    VAC VeraFlo Instill Volume (ml) 24    VAC VeraFlo - Therapy Time (hrs) 1    VAC VeraFlo Pressure (mm/Hg) Intermittent;125 mmHg            Wound 08/07/20 Full Thickness Wound Leg LLE posterior, extends medially and laterally (Active)   Wound Image       Site Assessment Pink;Red;Yellow    Periwound Assessment Clean;Dry;Intact    Margins Attached edges;Defined edges    Closure Secondary intention    Drainage Amount Small    Drainage Description Serosanguineous    Treatments Cleansed    Wound Cleansing Normal Saline Irrigation    Periwound Protectant Skin Protectant Wipes to Periwound;Benzoin;Drape    Dressing Cleansing/Solutions Normal Saline    Dressing Options Wound Vac    Dressing Changed Changed    Dressing Status Clean;Dry;Intact    Dressing Change/Treatment Frequency Monday, Wednesday, Friday, and As Needed    NEXT Dressing Change/Treatment Date 10/05/20    NEXT Weekly Photo (Inpatient Only) 10/09/20    Non-staged Wound Description Full thickness    Wound Length (cm) 17 cm    Wound Width (cm) 7 cm     Wound Depth (cm) 0.1 cm    Wound Surface Area (cm^2) 119 cm^2    Wound Volume (cm^3) 11.9 cm^3    Wound Healing % 77    Wound Bed Granulation (%) 70 %    Wound Bed Slough (%) 30 %    Shape irregular    Wound Odor Mild    Pulses Left;2+;DP;PT    Exposed Structures None      Posterolateral: 15 x 4 x + 0.1    CESAR:   CESAR Results, Last 30 Days Us-cesar Single Level Bilat    Result Date: 2020  Narrative  Vascular Laboratory  Conclusions  1.  Normal bilateral CESAR's.  GRUPO GANN  Age:    64    Gender:     M  MRN:    [id number]  :    1955      BSA:  Exam Date:     2020 09:59  Room #:     Inpatient  Priority:     Routine  Ht (in):             Wt (lb):  Ordering Physician:        EDDA CANADA  Referring Physician:       [id number] Morrissey  Sonographer:               Deja Ellis RDMS                             RVT  Study Type:                Complete Bilateral  Technical Quality:         Adequate  Indications:     Ulceration of LE  CPT Codes:       62803  ICD Codes:       707.1  History:         Nonhealing wound of left lower extremity.  Limitations:                 RIGHT  Waveform            Systolic BPs (mmHg)                             117           Brachial  Triphasic                                Common Femoral  Triphasic                  138           Posterior Tibial  Triphasic                  132           Dorsalis Pedis                                           Peroneal                             1.18          CESAR                                           TBI                       LEFT  Waveform        Systolic BPs (mmHg)                             114           Brachial  Triphasic                                Common Femoral  Triphasic                  127           Posterior Tibial  Triphasic                  122           Dorsalis Pedis                                           Peroneal                              1.09          KOSTAS                                           TBI  Findings  Right.  Doppler waveforms of the common femoral artery are of high amplitude and  triphasic.  Doppler waveforms at the ankle are brisk and triphasic.  Ankle-brachial index is normal.  Left.  Doppler waveforms of the common femoral artery are of high amplitude and  triphasic.  Doppler waveforms at the ankle are brisk and triphasic.  Ankle-brachial index is normal.  Unable to obtained popliteal waveforms due to wound bandages.  Additional testing was not performed in accordance with lower extremity  arterial evaluation protocol.  Clover Maya  (Electronically Signed)  Final Date:      02 September 2020                   11:14    Lab Values:    Lab Results   Component Value Date/Time    WBC 5.5 09/30/2020 09:23 AM    RBC 3.74 (L) 09/30/2020 09:23 AM    HEMOGLOBIN 10.8 (L) 09/30/2020 09:23 AM    HEMATOCRIT 32.6 (L) 09/30/2020 09:23 AM    CREACTPROT 1.07 (H) 08/12/2020 01:55 PM    SEDRATEWES 85 (H) 08/07/2020 01:20 PM    HBA1C 5.7 (H) 11/09/2018 06:30 PM        Culture Results show:  Recent Results (from the past 720 hour(s))   CULTURE WOUND W/ GRAM STAIN    Collection Time: 09/01/20  2:00 PM    Specimen: Left Leg; Wound   Result Value Ref Range    Significant Indicator POS (POS)     Source WND     Site LEFT LEG     Culture Result - (A)     Gram Stain Result       Moderate Gram positive cocci.  Moderate Gram positive rods.      Culture Result (A)      Beta Hemolytic Streptococcus group A  Moderate growth      Culture Result (A)      Methicillin Resistant Staphylococcus aureus  Moderate growth      Culture Result (A)      Diphtheroids  Moderate growth  Mixed morphologies.         INTERVENTIONS BY WOUND TEAM:    Patient removed pants 2 layer compression wrap removed and left LE was washed with foam wash and warm washcloth, air dried.  Wound vac dressing removed and wound bed cleansed with NS and gauze.  Meredith-wound prepped with no sting, benzoin  and paste ring.  Black veraflo foam to wound bed, 5 pieces, sealed with drape and TRAC pad applied to proximal wound bed at thigh. Sacral mepilex applied superior thigh and tubing applied to mepilex   Resumed Veraflo,veraflo settings 24mL for 6 minutes instillation hourly.  No leaks noted. 2 layer compression wrap applied to left LE. Instillation fluid changed back to NS. New pants back on, tubing coming out of the waist    Interdisciplinary consultation: Patient, Bedside RN    EVALUATION / RATIONALE FOR TREATMENT:  Lateral wound still with some slough, both wounds smaller per measurements. Medial wound with all granular tissue.      9/30: Patient to start abx therapy for 7 days course per Dr. Ellis.  Started dakin's instillation to veraflo  9/28: malodorous today, culture taken.    9/25: Odor noted, though unclear if from wound or pt, consider shower before next Vac change. Consider regular vac next change, wound granular and superficial.   9/23: Patient still awaiting guardianship for placement.   9/18: wound granulating nicely and responding well to current treatment.    9/16: Wound bed with granular tissue, edges are thick but appear better than previous assessments. . NPWT VF to encourage closure by secondary intention and manage drainage while encouraging oxygenation and granulation to the wound bed. 2 layer compression to assist in decrease of swelling and encourage blood flow to wounds. Wound team to follow up and change 3x/week.      Goals: Steady decrease in wound area and depth weekly.    NURSING PLAN OF CARE ORDERS (X):    Dressing changes: See Dressing Care orders: X  Skin care: See Skin Care orders:   Rectal tube care: See Rectal Tube Care orders:   Other orders:      WOUND TEAM PLAN OF CARE:   Dressing changes by wound team:                   Follow up 3 times weekly:                NPWT change 3 times weekly:   X  Follow up 1-2 times weekly:      Follow up Bi-Monthly:                   Follow up  as needed:       Other (explain):     Anticipated discharge plans: pending guardianship.   LTACH:        SNF/Rehab: X - patient will need ongoing wound care for LLE upon discharge - 3x/week.                 Home Health Care:           Outpatient Wound Center:            Self Care:

## 2020-10-03 PROCEDURE — A9270 NON-COVERED ITEM OR SERVICE: HCPCS | Performed by: NURSE PRACTITIONER

## 2020-10-03 PROCEDURE — 700102 HCHG RX REV CODE 250 W/ 637 OVERRIDE(OP): Performed by: NURSE PRACTITIONER

## 2020-10-03 PROCEDURE — 700111 HCHG RX REV CODE 636 W/ 250 OVERRIDE (IP): Performed by: STUDENT IN AN ORGANIZED HEALTH CARE EDUCATION/TRAINING PROGRAM

## 2020-10-03 PROCEDURE — 770021 HCHG ROOM/CARE - ISO PRIVATE

## 2020-10-03 PROCEDURE — 700105 HCHG RX REV CODE 258: Performed by: STUDENT IN AN ORGANIZED HEALTH CARE EDUCATION/TRAINING PROGRAM

## 2020-10-03 RX ADMIN — OXYCODONE HYDROCHLORIDE 5 MG: 5 TABLET ORAL at 13:30

## 2020-10-03 RX ADMIN — PIPERACILLIN AND TAZOBACTAM 3.38 G: 3; .375 INJECTION, POWDER, LYOPHILIZED, FOR SOLUTION INTRAVENOUS; PARENTERAL at 13:31

## 2020-10-03 RX ADMIN — DIVALPROEX SODIUM 125 MG: 125 CAPSULE ORAL at 06:41

## 2020-10-03 RX ADMIN — OXYCODONE HYDROCHLORIDE 5 MG: 5 TABLET ORAL at 00:45

## 2020-10-03 RX ADMIN — GABAPENTIN 100 MG: 100 CAPSULE ORAL at 16:57

## 2020-10-03 RX ADMIN — RISPERIDONE 1 MG: 1 TABLET ORAL at 16:56

## 2020-10-03 RX ADMIN — OXYCODONE HYDROCHLORIDE 5 MG: 5 TABLET ORAL at 16:57

## 2020-10-03 RX ADMIN — OXYCODONE HYDROCHLORIDE 5 MG: 5 TABLET ORAL at 20:58

## 2020-10-03 RX ADMIN — OXYCODONE HYDROCHLORIDE 5 MG: 5 TABLET ORAL at 09:52

## 2020-10-03 RX ADMIN — DIVALPROEX SODIUM 125 MG: 125 CAPSULE ORAL at 20:58

## 2020-10-03 RX ADMIN — GABAPENTIN 100 MG: 100 CAPSULE ORAL at 13:30

## 2020-10-03 RX ADMIN — AMLODIPINE BESYLATE 5 MG: 5 TABLET ORAL at 09:52

## 2020-10-03 RX ADMIN — PIPERACILLIN AND TAZOBACTAM 3.38 G: 3; .375 INJECTION, POWDER, LYOPHILIZED, FOR SOLUTION INTRAVENOUS; PARENTERAL at 06:40

## 2020-10-03 RX ADMIN — RISPERIDONE 0.5 MG: 0.5 TABLET ORAL at 06:41

## 2020-10-03 RX ADMIN — GABAPENTIN 100 MG: 100 CAPSULE ORAL at 06:41

## 2020-10-03 RX ADMIN — Medication 400 MG: at 16:56

## 2020-10-03 RX ADMIN — OXYCODONE HYDROCHLORIDE 5 MG: 5 TABLET ORAL at 06:40

## 2020-10-03 RX ADMIN — Medication 400 MG: at 06:41

## 2020-10-03 RX ADMIN — PIPERACILLIN AND TAZOBACTAM 3.38 G: 3; .375 INJECTION, POWDER, LYOPHILIZED, FOR SOLUTION INTRAVENOUS; PARENTERAL at 20:58

## 2020-10-03 RX ADMIN — DIVALPROEX SODIUM 125 MG: 125 CAPSULE ORAL at 13:30

## 2020-10-03 ASSESSMENT — PAIN DESCRIPTION - PAIN TYPE
TYPE: ACUTE PAIN
TYPE: ACUTE PAIN
TYPE: CHRONIC PAIN
TYPE: CHRONIC PAIN
TYPE: ACUTE PAIN
TYPE: CHRONIC PAIN
TYPE: CHRONIC PAIN

## 2020-10-03 ASSESSMENT — ENCOUNTER SYMPTOMS
NERVOUS/ANXIOUS: 0
BLURRED VISION: 0
NAUSEA: 0
COUGH: 1
DIZZINESS: 0
HEADACHES: 1
INSOMNIA: 0
DOUBLE VISION: 0
VOMITING: 0
PALPITATIONS: 0
SHORTNESS OF BREATH: 0
FALLS: 0
CHILLS: 0
FEVER: 0

## 2020-10-03 NOTE — CARE PLAN
Problem: Safety  Goal: Will remain free from falls  Outcome: PROGRESSING AS EXPECTED  Note: Educated pt to use call button to call for help before getting up. Bed rails up x2. Provided hospital non-slip socks. Bed locked and at the lowest position. Call light and personal belongings within reach. Safety sitter at bedside.     Problem: Pain Management  Goal: Pain level will decrease to patient's comfort goal  Outcome: PROGRESSING AS EXPECTED  Note:   Discussed pt's desired comfort goal. Educated on pharm/non-pharm measures of preventing pain. Verbalized understanding. Pt has been within comfort goal.

## 2020-10-03 NOTE — PROGRESS NOTES
"Hospital Medicine Twice Weekly Progress Note    Date of Service  10/3/2020    Chief Complaint  LLE Wound    Hospital Course   Mr. Varela is a 64-year-old male with PMH significant for homelessness, etoh use, tobacco dependence and chronic LLE wound who presented 8/7/2020 with LLE wound infection.  He was hospitalized at our facility in April and evaluated by orthopedic surgery who recommended wound care.  Wound cultures at that time grew MSSA and strep. Patient reported losing his Medicaid card and having no transportation to wound clinic.  He was seen at [facility] earlier this week and prescribed ABX, however he has not filled the prescription.  US LLE negative for DVT.  Treated for sepsis with empiric ABX and wound care. He completed ABX course 9/16. He was found to have head lice and underwent treatments. Continued to have intermittent agitation so was started on risperdol, depakote and gabapentin per psych recs.  He has been deemed incapacitated to make medical decisions and is currently pending guardianship and placement.  He is medically stable and will be only be evaluated 2 times weekly unless there is a clinical change. Repeat wound culture 9/28 grew Strep A and proteus. ID was consulted and recommended 5-day IV ABX with Zosyn - stop date 10/5.       Interval Problem Update  10/3 - he is excited because a baseball game \"from when I was knee high to a fly\" is on TV. Emotional lability at his baseline.  -wound vac. Ambulatory.   9/30 - discussed recent culture results with ID, Dr. Ellis who recommends 7 day course PO ABX. Patient afebrile with no indications of sepsis.  -seen and examined during dressing change. Wound foul smelling with bloody drainage.     Consultants/Specialty  Infectious Disease  Psychiatry    Code Status  Full Code    Disposition  Pending guardianship and placement - likely Group Home     Review of Systems  Review of Systems   Constitutional: Positive for malaise/fatigue. " "Negative for chills and fever.   HENT: Negative.    Eyes: Negative for blurred vision and double vision.   Respiratory: Positive for cough. Negative for shortness of breath.    Cardiovascular: Negative for chest pain and palpitations.   Gastrointestinal: Negative for nausea and vomiting.   Genitourinary: Negative for dysuria and urgency.   Musculoskeletal: Negative for falls and joint pain.   Skin:        LLE wound \"doesn't hurt that much\".   Neurological: Positive for headaches. Negative for dizziness.   Psychiatric/Behavioral: The patient is not nervous/anxious and does not have insomnia.    All other systems reviewed and are negative.       Physical Exam  Temp:  [36.8 °C (98.2 °F)-37 °C (98.6 °F)] 36.8 °C (98.3 °F)  Pulse:  [69-88] 81  Resp:  [16-18] 17  BP: (113-133)/(72-86) 113/77  SpO2:  [92 %-94 %] 92 %    Physical Exam  Vitals signs and nursing note reviewed. Exam conducted with a chaperone present.   Constitutional:       General: He is awake. He is not in acute distress.     Appearance: He is underweight. He is ill-appearing. He is not toxic-appearing or diaphoretic.   HENT:      Head: Normocephalic and atraumatic.      Nose: Nose normal.      Mouth/Throat:      Lips: Pink.      Mouth: Mucous membranes are moist.   Eyes:      General: No scleral icterus.     Conjunctiva/sclera: Conjunctivae normal.   Neck:      Musculoskeletal: Normal range of motion.   Cardiovascular:      Rate and Rhythm: Normal rate.   Pulmonary:      Effort: Pulmonary effort is normal.   Abdominal:      General: There is no distension.      Tenderness: There is no abdominal tenderness.   Musculoskeletal:      Right lower leg: No edema.      Left lower leg: No edema.   Skin:     General: Skin is warm and dry.      Comments: Left leg wound - foul smelling with bloody drainage   Neurological:      Mental Status: He is alert.      Gait: Gait normal.      Comments: Oriented x 2 (person place, thought it was 2008)  Confused intermittently "   Psychiatric:         Mood and Affect: Mood and affect normal.         Behavior: Behavior is not aggressive or combative. Behavior is cooperative.         Cognition and Memory: Cognition is impaired.         Judgment: Judgment is impulsive.      Comments:            Fluids    Intake/Output Summary (Last 24 hours) at 10/3/2020 1536  Last data filed at 10/3/2020 1200  Gross per 24 hour   Intake 2340 ml   Output 2150 ml   Net 190 ml       Laboratory                        Imaging  IR-MIDLINE CATHETER INSERTION WO GUIDANCE > AGE 3   Final Result                  Ultrasound-guided midline placement performed by qualified nursing staff    as above.          US-KOSTAS SINGLE LEVEL BILAT   Final Result      CT-HEAD W/O   Final Result      No acute intracranial abnormality      DX-TIBIA AND FIBULA LEFT   Final Result      1.  Healed distal metaphyseal fractures of the left fibula and tibia with tibial IM layla in place.      2.  No acute fracture or dislocation.      3.  No radiopaque soft tissue foreign body.      DX-FEMUR-2+ LEFT   Final Result      1.  No radiographic evidence of acute traumatic injury left femur.      2.  Unchanged cortical thickening in the posterior aspect of the distal femoral diaphysis which may represent sequela of prior injury or infection.      3.  No soft tissue foreign body identified.      US-EXTREMITY VENOUS LOWER UNILAT LEFT   Final Result      DX-CHEST-PORTABLE (1 VIEW)   Final Result      No acute cardiopulmonary abnormality.           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   -blood cultures (-)   -afebrile. No Leukocytosis   -LPS following for serial debridements; no need for Vac at dc  -Cx 9/1 Strep, MRSA, diptheroids: completed Zyvox 9/2 thru 9/16  -cx 9/28 strep A and proteus - ID consulted and recommended 7 day PO ABX  course    Encephalopathy- (present on admission)  Assessment & Plan  -acute on chronic, likely due Wernicke's   -Psychiatry: incapacitated to make medical decision or leave AMA   -Requires guardianship and placement  -Avoid narcotics and hypnotics.  Cautious use of benzodiazepines for alcohol withdrawal protocol  -Frequent reorientation  -Sleep hygiene    Non-compliance  Assessment & Plan  -likely also component of psychiatric disorder and ETOH abuse  -Psychiatric consulted and suggests patient is incapacitated to make medical decisions or leave AMA  -ongoing encouragement for compliance.    Psychiatric disorder  Assessment & Plan  -unknown/unspecified  -psychiatry consulted and deemed him incapacitated to leave AMA or make medical decisions  -continue risperdol 0.5 -1mg BID, cont depakote 125mg TID added neurotin 100mg TID 8/16  -Pending guardianship. Application submitted 9/3.    Flexion contracture of knee, 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)  Assessment & Plan  -secondary to chronic wound in that area  -PT OT  -encourage mobility    Emphysema/COPD (HCC)  Assessment & Plan  Stable. RA. Chronic w/o exacerbation    Head lice  Assessment & Plan  -he has received 3 treatments  -now resolved      Alcohol dependence (HCC)- (present on admission)  Assessment & Plan  -history of. Cont MV    Protein malnutrition (HCC)  Assessment & Plan  -history of ETOH and homelessness  -Body mass index is 20.33 kg/m².  -TID supplements  -encourage PO intake    Tobacco dependence- (present on admission)  Assessment & Plan  -Nicotine replacement protocol       VTE prophylaxis: Ambulatory    I have performed a physical exam and reviewed and updated ROS and Assessment/Plan today (10/03/20). In review of the previous note there are no changes except as documented above    I certify the patient requires continued medically necessary hospital services for the treatment of non-healing wound.  The patient will remain in the hospital for the  foreseeable future.  Discharge may or may not occur in the next 20 days due to ongoing discharge delays due to having no medically acceptable discharge options.    Please note that this dictation was created using voice recognition software. I have made every reasonable attempt to correct obvious errors, but there may be errors of grammar and possibly content that I did not discover before finalizing the note.    LOUISE Khan.

## 2020-10-04 PROCEDURE — 700102 HCHG RX REV CODE 250 W/ 637 OVERRIDE(OP): Performed by: NURSE PRACTITIONER

## 2020-10-04 PROCEDURE — A9270 NON-COVERED ITEM OR SERVICE: HCPCS | Performed by: NURSE PRACTITIONER

## 2020-10-04 PROCEDURE — 700111 HCHG RX REV CODE 636 W/ 250 OVERRIDE (IP): Performed by: STUDENT IN AN ORGANIZED HEALTH CARE EDUCATION/TRAINING PROGRAM

## 2020-10-04 PROCEDURE — 700111 HCHG RX REV CODE 636 W/ 250 OVERRIDE (IP): Performed by: NURSE PRACTITIONER

## 2020-10-04 PROCEDURE — 700105 HCHG RX REV CODE 258: Performed by: STUDENT IN AN ORGANIZED HEALTH CARE EDUCATION/TRAINING PROGRAM

## 2020-10-04 PROCEDURE — 770021 HCHG ROOM/CARE - ISO PRIVATE

## 2020-10-04 RX ORDER — SODIUM CHLORIDE 9 MG/ML
INJECTION, SOLUTION INTRAVENOUS
Status: ACTIVE
Start: 2020-10-04 | End: 2020-10-04

## 2020-10-04 RX ADMIN — DIVALPROEX SODIUM 125 MG: 125 CAPSULE ORAL at 21:54

## 2020-10-04 RX ADMIN — HALOPERIDOL LACTATE 5 MG: 5 INJECTION, SOLUTION INTRAMUSCULAR at 21:54

## 2020-10-04 RX ADMIN — OXYCODONE HYDROCHLORIDE 5 MG: 5 TABLET ORAL at 15:41

## 2020-10-04 RX ADMIN — RISPERIDONE 0.5 MG: 0.5 TABLET ORAL at 05:18

## 2020-10-04 RX ADMIN — PIPERACILLIN AND TAZOBACTAM 3.38 G: 3; .375 INJECTION, POWDER, LYOPHILIZED, FOR SOLUTION INTRAVENOUS; PARENTERAL at 05:19

## 2020-10-04 RX ADMIN — GABAPENTIN 100 MG: 100 CAPSULE ORAL at 05:18

## 2020-10-04 RX ADMIN — Medication 400 MG: at 17:51

## 2020-10-04 RX ADMIN — DIVALPROEX SODIUM 125 MG: 125 CAPSULE ORAL at 12:25

## 2020-10-04 RX ADMIN — RISPERIDONE 1 MG: 1 TABLET ORAL at 17:51

## 2020-10-04 RX ADMIN — DIVALPROEX SODIUM 125 MG: 125 CAPSULE ORAL at 05:18

## 2020-10-04 RX ADMIN — AMLODIPINE BESYLATE 5 MG: 5 TABLET ORAL at 05:18

## 2020-10-04 RX ADMIN — OXYCODONE HYDROCHLORIDE 5 MG: 5 TABLET ORAL at 12:25

## 2020-10-04 RX ADMIN — OXYCODONE HYDROCHLORIDE 5 MG: 5 TABLET ORAL at 22:06

## 2020-10-04 RX ADMIN — OXYCODONE HYDROCHLORIDE 5 MG: 5 TABLET ORAL at 08:55

## 2020-10-04 RX ADMIN — GABAPENTIN 100 MG: 100 CAPSULE ORAL at 17:51

## 2020-10-04 RX ADMIN — PIPERACILLIN AND TAZOBACTAM 3.38 G: 3; .375 INJECTION, POWDER, LYOPHILIZED, FOR SOLUTION INTRAVENOUS; PARENTERAL at 21:56

## 2020-10-04 RX ADMIN — OXYCODONE HYDROCHLORIDE 5 MG: 5 TABLET ORAL at 05:18

## 2020-10-04 RX ADMIN — Medication 400 MG: at 05:18

## 2020-10-04 RX ADMIN — PIPERACILLIN AND TAZOBACTAM 3.38 G: 3; .375 INJECTION, POWDER, LYOPHILIZED, FOR SOLUTION INTRAVENOUS; PARENTERAL at 12:27

## 2020-10-04 RX ADMIN — GABAPENTIN 100 MG: 100 CAPSULE ORAL at 12:25

## 2020-10-04 ASSESSMENT — PAIN DESCRIPTION - PAIN TYPE
TYPE: CHRONIC PAIN

## 2020-10-04 ASSESSMENT — PAIN SCALES - WONG BAKER: WONGBAKER_NUMERICALRESPONSE: DOESN'T HURT AT ALL

## 2020-10-04 NOTE — CARE PLAN
Problem: Safety  Goal: Will remain free from falls  Outcome: PROGRESSING AS EXPECTED  1:1 sitter at door. Other safety precautions are in place including bed locked and in lowest position, upper bed rails up, bed alarm on, call light within reach, treaded socks on, tray table and personal belongings within reach.      Problem: Infection  Goal: Will remain free from infection  Outcome: PROGRESSING AS EXPECTED  Patient receiving IV ABX until 10/05/2020. Patient has midline catheter to L upper arm. Standard precautions and MRSA contact precautions in place.

## 2020-10-04 NOTE — CARE PLAN
Problem: Safety  Goal: Will remain free from falls  Outcome: PROGRESSING AS EXPECTED  Note: Safety precautions in place. Bed in lowest and locked position. Call light and personal belongings within reach      Problem: Infection  Goal: Will remain free from infection  Outcome: PROGRESSING AS EXPECTED  Note: Standard precautions in place

## 2020-10-05 PROCEDURE — 700102 HCHG RX REV CODE 250 W/ 637 OVERRIDE(OP): Performed by: NURSE PRACTITIONER

## 2020-10-05 PROCEDURE — A9270 NON-COVERED ITEM OR SERVICE: HCPCS | Performed by: NURSE PRACTITIONER

## 2020-10-05 PROCEDURE — 302098 PASTE RING (FLAT): Performed by: NURSE PRACTITIONER

## 2020-10-05 PROCEDURE — 770021 HCHG ROOM/CARE - ISO PRIVATE

## 2020-10-05 PROCEDURE — 700105 HCHG RX REV CODE 258: Performed by: STUDENT IN AN ORGANIZED HEALTH CARE EDUCATION/TRAINING PROGRAM

## 2020-10-05 PROCEDURE — 97606 NEG PRS WND THER DME>50 SQCM: CPT

## 2020-10-05 PROCEDURE — 700111 HCHG RX REV CODE 636 W/ 250 OVERRIDE (IP): Performed by: STUDENT IN AN ORGANIZED HEALTH CARE EDUCATION/TRAINING PROGRAM

## 2020-10-05 PROCEDURE — 29581 APPL MULTLAYER CMPRN SYS LEG: CPT

## 2020-10-05 RX ADMIN — GABAPENTIN 100 MG: 100 CAPSULE ORAL at 08:29

## 2020-10-05 RX ADMIN — GABAPENTIN 100 MG: 100 CAPSULE ORAL at 13:00

## 2020-10-05 RX ADMIN — OXYCODONE HYDROCHLORIDE 5 MG: 5 TABLET ORAL at 08:30

## 2020-10-05 RX ADMIN — RISPERIDONE 0.5 MG: 0.5 TABLET ORAL at 08:29

## 2020-10-05 RX ADMIN — DIVALPROEX SODIUM 125 MG: 125 CAPSULE ORAL at 08:34

## 2020-10-05 RX ADMIN — Medication 400 MG: at 16:03

## 2020-10-05 RX ADMIN — Medication 400 MG: at 08:29

## 2020-10-05 RX ADMIN — DIVALPROEX SODIUM 125 MG: 125 CAPSULE ORAL at 13:00

## 2020-10-05 RX ADMIN — DIVALPROEX SODIUM 125 MG: 125 CAPSULE ORAL at 20:53

## 2020-10-05 RX ADMIN — PIPERACILLIN AND TAZOBACTAM 3.38 G: 3; .375 INJECTION, POWDER, LYOPHILIZED, FOR SOLUTION INTRAVENOUS; PARENTERAL at 05:19

## 2020-10-05 RX ADMIN — OXYCODONE HYDROCHLORIDE 5 MG: 5 TABLET ORAL at 16:04

## 2020-10-05 RX ADMIN — RISPERIDONE 1 MG: 1 TABLET ORAL at 16:03

## 2020-10-05 RX ADMIN — OXYCODONE HYDROCHLORIDE 5 MG: 5 TABLET ORAL at 13:00

## 2020-10-05 RX ADMIN — AMLODIPINE BESYLATE 5 MG: 5 TABLET ORAL at 08:30

## 2020-10-05 RX ADMIN — GABAPENTIN 100 MG: 100 CAPSULE ORAL at 16:04

## 2020-10-05 RX ADMIN — OXYCODONE HYDROCHLORIDE 5 MG: 5 TABLET ORAL at 20:53

## 2020-10-05 RX ADMIN — ACETAMINOPHEN 650 MG: 325 TABLET, FILM COATED ORAL at 20:53

## 2020-10-05 ASSESSMENT — PAIN DESCRIPTION - PAIN TYPE
TYPE: ACUTE PAIN;CHRONIC PAIN
TYPE: CHRONIC PAIN

## 2020-10-05 NOTE — CARE PLAN
Problem: Safety  Goal: Will remain free from falls  Outcome: PROGRESSING AS EXPECTED  Note: Educated pt to call for help before getting up. Bed rails up x2. Provided hospital non-slip socks. Bed locked and at the lowest position. 1:1 sitter at bedside.     Problem: Pain Management  Goal: Pain level will decrease to patient's comfort goal  Outcome: PROGRESSING AS EXPECTED  Note:   Discussed pt's desired comfort goal. Educated on pharm/non-pharm measures of preventing pain. Verbalized understanding. Pt has been within comfort goal.

## 2020-10-05 NOTE — WOUND TEAM
[facility]  Inpatient Services   Wound and Skin Care Progress Note    Admission Date: 8/7/2020     Last order of IP CONSULT TO WOUND CARE was found on 9/1/2020 from Hospital Encounter on 8/7/2020     HPI, PMH, SH: Reviewed    Unit where seen by Wound Team: [id number]     WOUND CONSULT/FOLLOW UP RELATED TO:  Left leg scheduled NPWT change     Self Report / Pain Level:  States 10/10 with oral pre-medication per nursing    OBJECTIVE:  Vac dressing in place with 2 layer compression.    WOUND TYPE, LOCATION, CHARACTERISTICS (Pressure Injuries: location, stage, POA or date identified)        Negative Pressure Wound Therapy 09/03/20 Leg Lower;Medial;Posterior Left (Active)   NPWT Pump Mode / Pressure Setting Ulta;Continuous;125 mmHg 10/05/20 1100   Dressing Type Medium;Black Foam (Veraflo) 10/05/20 1100   Number of Foam Pieces Used 3 10/05/20 1100   Canister Changed No 10/05/20 1100   Output (mL) 500 mL 10/05/20 0400   NEXT Dressing Change/Treatment Date 10/07/20 10/05/20 1100   VAC VeraFlo Irrigant Normal Saline 10/05/20 1100   VAC VeraFlo Soak Time (mins) 6 10/05/20 1100   VAC VeraFlo Instill Volume (ml) 24 10/05/20 1100   VAC VeraFlo - Therapy Time (hrs) 1 10/05/20 1100   VAC VeraFlo Pressure (mm/Hg) Continuous;125 mmHg 10/05/20 1100   WOUND NURSE ONLY - Time Spent with Patient (mins) 60 10/05/20 1100           Wound 08/07/20 Full Thickness Wound Leg LLE posterior, extends medially and laterally (Active)   Wound Image    10/02/20 1128   Site Assessment Pink;Red;Yellow;Fully granulated 10/05/20 1100   Periwound Assessment Clean;Dry;Intact 10/05/20 1100   Margins Defined edges;Attached edges 10/05/20 1100   Closure Secondary intention 10/05/20 1100   Drainage Amount Moderate 10/05/20 1100   Drainage Description Serosanguineous 10/05/20 1100   Treatments Cleansed;Irrigation;Offloading;Site care 10/05/20 1100   Wound Cleansing Approved Wound Cleanser 10/05/20 1100   Periwound Protectant Paste Ring;Benzoin;Drape  10/05/20 1100   Dressing Cleansing/Solutions Normal Saline 10/05/20 1100   Dressing Options Wound Vac 10/05/20 1100   Dressing Changed Changed 10/05/20 1100   Dressing Status Clean;Dry;Intact 10/05/20 1100   Dressing Change/Treatment Frequency By Wound Team Only 10/05/20 1100   NEXT Dressing Change/Treatment Date 10/07/20 10/05/20 1100   NEXT Weekly Photo (Inpatient Only) 10/09/20 10/02/20 2117   Non-staged Wound Description Full thickness 10/05/20 1100   Wound Length (cm) 17 cm 10/02/20 1128   Wound Width (cm) 7 cm 10/02/20 1128   Wound Depth (cm) 0.1 cm 10/02/20 1128   Wound Surface Area (cm^2) 119 cm^2 10/02/20 1128   Wound Volume (cm^3) 11.9 cm^3 10/02/20 1128   Post-Procedure Length (cm) 33 cm 20 1200   Post-Procedure Width (cm) 13.3 cm 20 1200   Post-Procedure Depth (cm) 0.3 cm 20 1200   Post-Procedure Surface Area (cm^2) 438.9 cm^2 20 1200   Post-Procedure Volume (cm^3) 131.67 cm^3 20 1200   Wound Healing % 77 10/02/20 1128   Wound Bed Granulation (%) 70 % 10/02/20 1128   Wound Bed Epithelium (%) 0 % 20 1300   Wound Bed Slough (%) 30 % 10/02/20 1128   Wound Bed Granulation (%) - Post-Procedure 0 % 20 1300   Wound Bed Epithelium % - (Post-Procedure) 0 % 20 1300   Wound Bed Slough % - (Post-Procedure) 0 % 20 1300   Wound Bed Eschar % - (Post-Procedure) 0 % 20 1300   Tunneling (cm) 0 cm 20 1300   Undermining (cm) 0 cm 20 1300   Shape Irregular 10/05/20 1100   Wound Odor Mild 10/05/20 1100   Pulses Left;2+;DP;PT 10/05/20 1100   Exposed Structures None 10/05/20 1100   WOUND NURSE ONLY - Time Spent with Patient (mins) 60 20 0930                KOSTAS:   KOSTAS Results, Last 30 Days Us-kostas Single Level Bilat    Result Date: 2020  Narrative  Vascular Laboratory  Conclusions  1.  Normal bilateral KOSTAS's.  GRUPO GANN  Age:    64    Gender:     M  MRN:    [id number]  :    1955      BSA:  Exam Date:     2020 09:59  Room #:      Inpatient  Priority:     Routine  Ht (in):             Wt (lb):  Ordering Physician:        EDDA CANADA  Referring Physician:       [id number]  Sonographer:               Deja Ellis RDMS                             RVT  Study Type:                Complete Bilateral  Technical Quality:         Adequate  Indications:     Ulceration of LE  CPT Codes:       92725  ICD Codes:       707.1  History:         Nonhealing wound of left lower extremity.  Limitations:                 RIGHT  Waveform            Systolic BPs (mmHg)                             117           Brachial  Triphasic                                Common Femoral  Triphasic                  138           Posterior Tibial  Triphasic                  132           Dorsalis Pedis                                           Peroneal                             1.18          KOSTAS                                           TBI                       LEFT  Waveform        Systolic BPs (mmHg)                             114           Brachial  Triphasic                                Common Femoral  Triphasic                  127           Posterior Tibial  Triphasic                  122           Dorsalis Pedis                                           Peroneal                             1.09          KOSTAS                                           TBI  Findings  Right.  Doppler waveforms of the common femoral artery are of high amplitude and  triphasic.  Doppler waveforms at the ankle are brisk and triphasic.  Ankle-brachial index is normal.  Left.  Doppler waveforms of the common femoral artery are of high amplitude and  triphasic.  Doppler waveforms at the ankle are brisk and triphasic.  Ankle-brachial index is normal.  Unable to obtained popliteal waveforms due to wound bandages.  Additional testing was not performed in accordance with lower extremity  arterial evaluation  protocol.  Clover VINCENT Zulma  (Electronically Signed)  Final Date:      02 September 2020                   11:14    Lab Values:    Lab Results   Component Value Date/Time    WBC 5.5 09/30/2020 09:23 AM    RBC 3.74 (L) 09/30/2020 09:23 AM    HEMOGLOBIN 10.8 (L) 09/30/2020 09:23 AM    HEMATOCRIT 32.6 (L) 09/30/2020 09:23 AM    CREACTPROT 1.07 (H) 08/12/2020 01:55 PM    SEDRATEWES 85 (H) 08/07/2020 01:20 PM    HBA1C 5.7 (H) 11/09/2018 06:30 PM        Culture Results show:  Recent Results (from the past 720 hour(s))   CULTURE WOUND W/ GRAM STAIN    Collection Time: 09/01/20  2:00 PM    Specimen: Left Leg; Wound   Result Value Ref Range    Significant Indicator POS (POS)     Source WND     Site LEFT LEG     Culture Result - (A)     Gram Stain Result       Moderate Gram positive cocci.  Moderate Gram positive rods.      Culture Result (A)      Beta Hemolytic Streptococcus group A  Moderate growth      Culture Result (A)      Methicillin Resistant Staphylococcus aureus  Moderate growth      Culture Result (A)      Diphtheroids  Moderate growth  Mixed morphologies.         INTERVENTIONS BY WOUND TEAM:    Patient removed pants 2 layer compression wrap removed and left LE was washed with foam wash and warm washcloth, air dried.  Wound vac dressing removed and wound bed cleansed with NS and gauze.  Meredith-wound prepped with benzoin and paste ring.  Black veraflo foam to wound bed, 3 veraflo black foam pieces, sealed with drape and TRAC pad applied to proximal wound bed at thigh. Sacral mepilex applied superior thigh and tubing applied to mepilex   Resumed Veraflo,veraflo settings 24mL for 6 minutes instillation hourly.  No leaks noted. 2 layer compression wrap applied to left LE.    Interdisciplinary consultation: Patient, Bedside RN    EVALUATION / RATIONALE FOR TREATMENT:    10/5: Lateral wound still with some slough, both wounds smaller per measurements. Medial wound with all granular tissue.  9/30: Patient to start abx therapy  for 7 days course per Dr. Ellis.  Started dakin's instillation to veraflo  9/28: malodorous today, culture taken.    9/25: Odor noted, though unclear if from wound or pt, consider shower before next Vac change. Consider regular vac next change, wound granular and superficial.   9/23: Patient still awaiting guardianship for placement.   9/18: wound granulating nicely and responding well to current treatment.    9/16: Wound bed with granular tissue, edges are thick but appear better than previous assessments. . NPWT VF to encourage closure by secondary intention and manage drainage while encouraging oxygenation and granulation to the wound bed. 2 layer compression to assist in decrease of swelling and encourage blood flow to wounds. Wound team to follow up and change 3x/week.      Goals: Steady decrease in wound area and depth weekly.    NURSING PLAN OF CARE ORDERS (X):    Dressing changes: See Dressing Care orders: X  Skin care: See Skin Care orders:   Rectal tube care: See Rectal Tube Care orders:   Other orders:      WOUND TEAM PLAN OF CARE:   Dressing changes by wound team:                   Follow up 3 times weekly:                NPWT change 3 times weekly:   X  Follow up 1-2 times weekly:      Follow up Bi-Monthly:                   Follow up as needed:       Other (explain):     Anticipated discharge plans: pending guardianship.   LTACH:        SNF/Rehab: X - patient will need ongoing wound care for LLE upon discharge - 3x/week.                 Home Health Care:           Outpatient Wound Center:            Self Care:

## 2020-10-05 NOTE — PROGRESS NOTES
"Patient awoke  Confused and disoriented.  Patient stated that he was supposed to go to a contest.  Attempted to reorient the patient.  The patient became frustrated at his confusion and started punching himself in the head while shouting \"I have no brain!\"  Administered Haldol PRN.  "

## 2020-10-05 NOTE — DISCHARGE PLANNING
Anticipated Discharge Disposition: Public Gaurdian with Group Home Placement    Action: Public Gaurdianship pending.    Barriers to Discharge: Guardianship, group home acceptance    Plan: f/u with medical team,  Teri Madrigal

## 2020-10-06 PROCEDURE — 700111 HCHG RX REV CODE 636 W/ 250 OVERRIDE (IP): Performed by: NURSE PRACTITIONER

## 2020-10-06 PROCEDURE — 770021 HCHG ROOM/CARE - ISO PRIVATE

## 2020-10-06 PROCEDURE — 700105 HCHG RX REV CODE 258

## 2020-10-06 PROCEDURE — 700102 HCHG RX REV CODE 250 W/ 637 OVERRIDE(OP): Performed by: NURSE PRACTITIONER

## 2020-10-06 PROCEDURE — A9270 NON-COVERED ITEM OR SERVICE: HCPCS | Performed by: NURSE PRACTITIONER

## 2020-10-06 RX ORDER — SODIUM CHLORIDE 9 MG/ML
INJECTION, SOLUTION INTRAVENOUS
Status: COMPLETED
Start: 2020-10-06 | End: 2020-10-06

## 2020-10-06 RX ADMIN — ACETAMINOPHEN 650 MG: 325 TABLET, FILM COATED ORAL at 05:00

## 2020-10-06 RX ADMIN — OXYCODONE HYDROCHLORIDE 5 MG: 5 TABLET ORAL at 12:38

## 2020-10-06 RX ADMIN — GABAPENTIN 100 MG: 100 CAPSULE ORAL at 05:00

## 2020-10-06 RX ADMIN — Medication 400 MG: at 05:00

## 2020-10-06 RX ADMIN — OXYCODONE HYDROCHLORIDE 5 MG: 5 TABLET ORAL at 09:41

## 2020-10-06 RX ADMIN — HALOPERIDOL LACTATE 5 MG: 5 INJECTION, SOLUTION INTRAMUSCULAR at 14:32

## 2020-10-06 RX ADMIN — DIVALPROEX SODIUM 125 MG: 125 CAPSULE ORAL at 12:38

## 2020-10-06 RX ADMIN — DIVALPROEX SODIUM 125 MG: 125 CAPSULE ORAL at 05:00

## 2020-10-06 RX ADMIN — Medication 400 MG: at 16:21

## 2020-10-06 RX ADMIN — RISPERIDONE 1 MG: 1 TABLET ORAL at 16:21

## 2020-10-06 RX ADMIN — RISPERIDONE 0.5 MG: 0.5 TABLET ORAL at 05:00

## 2020-10-06 RX ADMIN — GABAPENTIN 100 MG: 100 CAPSULE ORAL at 16:21

## 2020-10-06 RX ADMIN — OXYCODONE HYDROCHLORIDE 5 MG: 5 TABLET ORAL at 20:18

## 2020-10-06 RX ADMIN — OXYCODONE HYDROCHLORIDE 5 MG: 5 TABLET ORAL at 06:36

## 2020-10-06 RX ADMIN — AMLODIPINE BESYLATE 5 MG: 5 TABLET ORAL at 04:59

## 2020-10-06 RX ADMIN — OXYCODONE HYDROCHLORIDE 5 MG: 5 TABLET ORAL at 03:37

## 2020-10-06 RX ADMIN — GABAPENTIN 100 MG: 100 CAPSULE ORAL at 12:38

## 2020-10-06 RX ADMIN — DIVALPROEX SODIUM 125 MG: 125 CAPSULE ORAL at 20:18

## 2020-10-06 RX ADMIN — OXYCODONE HYDROCHLORIDE 5 MG: 5 TABLET ORAL at 00:05

## 2020-10-06 RX ADMIN — SODIUM CHLORIDE: 9 INJECTION, SOLUTION INTRAVENOUS at 10:15

## 2020-10-06 RX ADMIN — OXYCODONE HYDROCHLORIDE 5 MG: 5 TABLET ORAL at 16:21

## 2020-10-06 ASSESSMENT — PAIN DESCRIPTION - PAIN TYPE
TYPE: CHRONIC PAIN

## 2020-10-06 NOTE — CARE PLAN
Problem: Knowledge Deficit  Goal: Knowledge of disease process/condition, treatment plan, diagnostic tests, and medications will improve     Intervention: Explain information regarding disease process/condition, treatment plan, diagnostic tests, and medications and document in education  poc discussed- pt verbalized understanding, needs reinforcement.         Problem: Pain Management  Goal: Pain level will decrease to patient's comfort goal     Intervention: Follow pain managment plan developed in collaboration with patient and Interdisciplinary Team  Oxy given PRN with +results. Educated pt on importance of pain control- pt verbalized understanding.

## 2020-10-06 NOTE — PROGRESS NOTES
1915: Per LDA, states pt has midline. No Midline on assessment at start of shift. Pt has no IV meds ordered. Will remove from LDA.    Joao Carvalho RN.

## 2020-10-06 NOTE — PROGRESS NOTES
Pt becoming very agitated, trying to remove dressing/wound vac. Attempted to calm pt down, unsuccessful. Security called. Haldol given for agitation.

## 2020-10-06 NOTE — PROGRESS NOTES
Pt is A&Ox1. Pt anxious at times, able to calm pt down. Pt up self in room. Wound vac to LLE in place. Voiding w/o difficulty. Oxy given PRN with +results. 1:1 sitter at bedside.

## 2020-10-07 PROCEDURE — 11045 DBRDMT SUBQ TISS EACH ADDL: CPT | Performed by: NURSE PRACTITIONER

## 2020-10-07 PROCEDURE — 700102 HCHG RX REV CODE 250 W/ 637 OVERRIDE(OP): Performed by: NURSE PRACTITIONER

## 2020-10-07 PROCEDURE — 770021 HCHG ROOM/CARE - ISO PRIVATE

## 2020-10-07 PROCEDURE — 11042 DBRDMT SUBQ TIS 1ST 20SQCM/<: CPT | Performed by: NURSE PRACTITIONER

## 2020-10-07 PROCEDURE — A9270 NON-COVERED ITEM OR SERVICE: HCPCS | Performed by: NURSE PRACTITIONER

## 2020-10-07 PROCEDURE — 302098 PASTE RING (FLAT): Performed by: NURSE PRACTITIONER

## 2020-10-07 PROCEDURE — 97606 NEG PRS WND THER DME>50 SQCM: CPT

## 2020-10-07 PROCEDURE — 99231 SBSQ HOSP IP/OBS SF/LOW 25: CPT | Performed by: NURSE PRACTITIONER

## 2020-10-07 PROCEDURE — 29581 APPL MULTLAYER CMPRN SYS LEG: CPT

## 2020-10-07 RX ORDER — SODIUM CHLORIDE 9 MG/ML
INJECTION, SOLUTION INTRAVENOUS
Status: ACTIVE
Start: 2020-10-07 | End: 2020-10-08

## 2020-10-07 RX ADMIN — OXYCODONE HYDROCHLORIDE 5 MG: 5 TABLET ORAL at 20:22

## 2020-10-07 RX ADMIN — GABAPENTIN 100 MG: 100 CAPSULE ORAL at 12:20

## 2020-10-07 RX ADMIN — DIVALPROEX SODIUM 125 MG: 125 CAPSULE ORAL at 05:56

## 2020-10-07 RX ADMIN — RISPERIDONE 0.5 MG: 0.5 TABLET ORAL at 05:56

## 2020-10-07 RX ADMIN — Medication 400 MG: at 05:56

## 2020-10-07 RX ADMIN — AMLODIPINE BESYLATE 5 MG: 5 TABLET ORAL at 05:56

## 2020-10-07 RX ADMIN — DIVALPROEX SODIUM 125 MG: 125 CAPSULE ORAL at 20:22

## 2020-10-07 RX ADMIN — OXYCODONE HYDROCHLORIDE 5 MG: 5 TABLET ORAL at 05:56

## 2020-10-07 RX ADMIN — RISPERIDONE 1 MG: 1 TABLET ORAL at 17:19

## 2020-10-07 RX ADMIN — GABAPENTIN 100 MG: 100 CAPSULE ORAL at 05:56

## 2020-10-07 RX ADMIN — Medication 400 MG: at 17:19

## 2020-10-07 RX ADMIN — DIVALPROEX SODIUM 125 MG: 125 CAPSULE ORAL at 15:08

## 2020-10-07 RX ADMIN — GABAPENTIN 100 MG: 100 CAPSULE ORAL at 17:19

## 2020-10-07 RX ADMIN — OXYCODONE HYDROCHLORIDE 5 MG: 5 TABLET ORAL at 17:19

## 2020-10-07 RX ADMIN — OXYCODONE HYDROCHLORIDE 5 MG: 5 TABLET ORAL at 12:20

## 2020-10-07 ASSESSMENT — ENCOUNTER SYMPTOMS
HEADACHES: 0
DIZZINESS: 0
INSOMNIA: 0
NERVOUS/ANXIOUS: 0
NAUSEA: 0
VOMITING: 0
PALPITATIONS: 0
COUGH: 1
FEVER: 0
SHORTNESS OF BREATH: 0
BLURRED VISION: 0
FALLS: 0
DOUBLE VISION: 0

## 2020-10-07 ASSESSMENT — PAIN DESCRIPTION - PAIN TYPE: TYPE: CHRONIC PAIN

## 2020-10-07 NOTE — PROGRESS NOTES
Hospital Medicine Twice Weekly Progress Note    Date of Service  10/7/2020    Chief Complaint  LLE Wound    Hospital Course   Mr. Varela is a 64-year-old male with PMH significant for homelessness, etoh use, tobacco dependence and chronic LLE wound who presented 8/7/2020 with LLE wound infection.  He was hospitalized at our facility in April and evaluated by orthopedic surgery who recommended wound care.  Wound cultures at that time grew MSSA and strep. Patient reported losing his Medicaid card and having no transportation to wound clinic.  He was seen at [facility] earlier this week and prescribed ABX, however he has not filled the prescription.  US LLE negative for DVT.  Treated for sepsis with empiric ABX and wound care. He completed ABX course 9/16. He was found to have head lice and underwent treatments. Continued to have intermittent agitation so was started on risperdol, depakote and gabapentin per psych recs.  He has been deemed incapacitated to make medical decisions and is currently pending guardianship and placement.  He is medically stable and will be only be evaluated 2 times weekly unless there is a clinical change. Repeat wound culture 9/28 grew Strep A and proteus. ID was consulted and recommended 5-day IV ABX with Zosyn - stop date 10/5.       Interval Problem Update  10/7:  Flu vaccine ordered.  Completed IV antibiotic regimen.  Currently resting in bed.  No acute needs.  No evidence of uncontrolled pain.  VSS.  Wound vac in place.     Consultants/Specialty  Infectious Disease  Psychiatry    Code Status  Full Code    Disposition  Pending guardianship and placement - likely Group Home     Review of Systems  Review of Systems   Constitutional: Positive for malaise/fatigue. Negative for fever.   HENT: Negative.    Eyes: Negative for blurred vision and double vision.   Respiratory: Positive for cough. Negative for shortness of breath.    Cardiovascular: Negative for chest pain and  "palpitations.   Gastrointestinal: Negative for nausea and vomiting.   Genitourinary: Negative for dysuria and urgency.   Musculoskeletal: Negative for falls and joint pain.   Skin:        LLE wound \"doesn't hurt that much\".   Neurological: Negative for dizziness and headaches.   Psychiatric/Behavioral: The patient is not nervous/anxious and does not have insomnia.    All other systems reviewed and are negative.       Physical Exam  Temp:  [36.7 °C (98 °F)-36.9 °C (98.5 °F)] 36.7 °C (98.1 °F)  Pulse:  [] 65  Resp:  [16-17] 17  BP: (132-148)/(66-89) 132/80  SpO2:  [93 %-95 %] 93 %    Physical Exam  Vitals signs and nursing note reviewed. Exam conducted with a chaperone present.   Constitutional:       General: He is awake. He is not in acute distress.     Appearance: He is underweight. He is not ill-appearing.   HENT:      Head: Normocephalic and atraumatic.      Nose: Nose normal.      Mouth/Throat:      Lips: Pink.      Mouth: Mucous membranes are moist.   Eyes:      General:         Right eye: No discharge.         Left eye: No discharge.      Conjunctiva/sclera: Conjunctivae normal.   Neck:      Musculoskeletal: Normal range of motion.   Cardiovascular:      Rate and Rhythm: Normal rate.      Heart sounds: Normal heart sounds.   Pulmonary:      Effort: Pulmonary effort is normal.   Abdominal:      General: There is no distension.      Tenderness: There is no abdominal tenderness.   Musculoskeletal: Normal range of motion.      Right lower leg: No edema.      Left lower leg: No edema.   Skin:     General: Skin is warm and dry.      Comments: Left leg wound with wound vac in place.    Neurological:      Mental Status: He is alert.      Gait: Gait normal.      Comments: Oriented x 2 (person place, thought it was 2008)  Confused intermittently   Psychiatric:         Mood and Affect: Mood and affect normal.         Behavior: Behavior is not aggressive or combative. Behavior is cooperative.         Cognition and " Memory: Cognition is impaired.         Judgment: Judgment is impulsive.      Comments:            Fluids    Intake/Output Summary (Last 24 hours) at 10/7/2020 1008  Last data filed at 10/7/2020 0924  Gross per 24 hour   Intake 1800 ml   Output 1000 ml   Net 800 ml       Laboratory                        Imaging  IR-MIDLINE CATHETER INSERTION WO GUIDANCE > AGE 3   Final Result                  Ultrasound-guided midline placement performed by qualified nursing staff    as above.          US-KOSTAS SINGLE LEVEL BILAT   Final Result      CT-HEAD W/O   Final Result      No acute intracranial abnormality      DX-TIBIA AND FIBULA LEFT   Final Result      1.  Healed distal metaphyseal fractures of the left fibula and tibia with tibial IM layla in place.      2.  No acute fracture or dislocation.      3.  No radiopaque soft tissue foreign body.      DX-FEMUR-2+ LEFT   Final Result      1.  No radiographic evidence of acute traumatic injury left femur.      2.  Unchanged cortical thickening in the posterior aspect of the distal femoral diaphysis which may represent sequela of prior injury or infection.      3.  No soft tissue foreign body identified.      US-EXTREMITY VENOUS LOWER UNILAT LEFT   Final Result      DX-CHEST-PORTABLE (1 VIEW)   Final Result      No acute cardiopulmonary abnormality.           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   -blood cultures (-)   -afebrile. No Leukocytosis   -LPS following for serial debridements; no need for Vac at dc  -Cx 9/1 Strep, MRSA, diptheroids: completed Zyvox 9/2 thru 9/16  -cx 9/28 strep A and proteus - ID consulted and recommended course of IV zosyn completed on 10/5.    Encephalopathy- (present on admission)  Assessment & Plan  -acute on chronic, likely due Wernicke's   -Psychiatry: incapacitated to make medical  decision or leave AMA   -Requires guardianship and placement  -Avoid narcotics and hypnotics.  Cautious use of benzodiazepines for alcohol withdrawal protocol  -Frequent reorientation  -Sleep hygiene    Non-compliance  Assessment & Plan  -likely also component of psychiatric disorder and ETOH abuse  -Psychiatric consulted and suggests patient is incapacitated to make medical decisions or leave AMA  -ongoing encouragement for compliance.    Psychiatric disorder  Assessment & Plan  -unknown/unspecified  -psychiatry consulted and deemed him incapacitated to leave AMA or make medical decisions  -continue risperdol 0.5 -1mg BID, cont depakote 125mg TID added neurotin 100mg TID 8/16  -Pending guardianship. Application submitted 9/3.    Flexion contracture of knee, 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)  Assessment & Plan  -secondary to chronic wound in that area  -PT OT  -encourage mobility    Emphysema/COPD (HCC)  Assessment & Plan  Stable. RA. Chronic w/o exacerbation    Head lice  Assessment & Plan  -he has received 3 treatments  -now resolved      Alcohol dependence (HCC)- (present on admission)  Assessment & Plan  -history of. Cont MV    Protein malnutrition (HCC)  Assessment & Plan  -history of ETOH and homelessness  -Body mass index is 20.33 kg/m².  -TID supplements  -encourage PO intake    Tobacco dependence- (present on admission)  Assessment & Plan  -Nicotine replacement protocol       VTE prophylaxis: Ambulatory    I have performed a physical exam and reviewed and updated ROS and Assessment/Plan today (10/07/20). In review of the previous note there are no changes except as documented above    I certify the patient requires continued medically necessary hospital services for the treatment of non-healing wound.  The patient will remain in the hospital for the foreseeable future.  Discharge may or may not occur in the next 20 days due to ongoing discharge delays due to having no medically acceptable discharge  options.      LOUISE Marroquin.

## 2020-10-07 NOTE — PROGRESS NOTES
"WOUND CARE PROVIDER PROGRESS NOTE            HPI: 65-year-old male homelessness, EtOH use, tobacco dependence, chronic left lower extremity wound admitted 8/7/2020 with left lower extremity wound infection.  Patient was recently hospitalized at [facility] in April 2020, evaluated by orthopedic surgery who recommended wound care.  Wound culture grew MSSA and strep.  Patient was recently seen at [facility] prior to this admission was given a prescription for antibiotics but did not fill this.  Patient reports that he has had this wound for 8 to 10 years.  He reports that he fell and went \"ass over tea kettle\".  He reports that he would clean the wound almost daily with soap and water at the homeless shelter.  Per chart review, patient reported he developed the ulcer in May 2018 when he fell while hiking.  Patient was seen at wound care clinic in August 2018.  Patient had a  assisting him while he was living at [facility] care housing.  Wound VAC /was ordered however  had concerns with patient's compliancy and/ access to medical care.  Skilled nursing facility was contacted to have patient be admitted, however he was not accepted due to Medicaid.    Patient is on Lovenox 40 mg daily.  Refused today however he took it yesterday.  Allergies reviewed.  He denies any allergies.      Interval hx:   9/11/2020: pt calm cooperative. On zyvox. Seen during VAC and two layer compression wrap change. Pt tolerating VAC and wraps. Wound decreased in size.   9/18/2020: Patient denies any fevers, chills, nausea, vomiting.  He is tolerating the veraflo wound VAC and 2 layer compression wrap.  Wound is decreasing in size.  Increased granular tissue noted, wound edges have improved however he does have rolled edges to popliteal fossa area.  Patient calm and cooperative, watching sports.  9/30/2020: Denies fevers, chills, nausea, vomiting.  Tolerating wound VAC and 2 layer compression wrap.  Refused nail care.  Wound " culture positive for strep A and Proteus.  10/7/2020: completed 5 day course of zyvox. Denies fevers chills nausea vomiting.  Seen during veraflo VAC and 2 layer compression wrap change.              Results:  Wound culture: 9/28/2020: Positive for beta-hemolytic strep group A, Proteus.  Sensitivities pending    Wound cx: 9/1/2020 mrsa, diphtheroids, beta hemolytic strep group A    8/7/2020: X-ray tib-fib left:  1.  Healed distal metaphyseal fractures of the left fibula and tibia with tibial IM layla in place.     2.  No acute fracture or dislocation.     3.  No radiopaque soft tissue foreign body.      Arterial studies:   9/2/2020  Right.    Doppler waveforms of the common femoral artery are of high amplitude and    triphasic.    Doppler waveforms at the ankle are brisk and triphasic.    Ankle-brachial index is normal.       Left.    Doppler waveforms of the common femoral artery are of high amplitude and    triphasic.    Doppler waveforms at the ankle are brisk and triphasic.    Ankle-brachial index is normal.    Unable to obtained popliteal waveforms due to wound bandages.        Exam:  Left lower leg full-thickness ulcer  No odor.  Slough semi-thin, primarily concentrated to lateral aspect of leg wound  Thick closed wound edges  Palpable PT pulse to left foot +1   +2 DP left foot  Difficulty palpating popliteal due to scar tissue  No erythema  Able to extend left leg to 170 degrees. Able to flex left leg around 80 degrees  Increased pain with palpation      Pre-debridement left leg medial      Pre-debridement left lateral lower leg          Pre-debridement measurements: 29 x 13.5 x 0.2 cm    DESCRIPTION OF PROCEDURE:  Excision of skin, subcutaneous tissue including chronic rolled wound edges to left lower leg ulcer     PMH, medications and recent labs reviewed        PROCEDURE: A formal timeout was performed to confirm patient's correct name, correct site, correct procedure and correct laterality.    Liquid  lidocaine applied topically and allowed to dwell for approximately 5 minutes prior to start of procedure.  -Curette  used to excise closed wound edges, slough and scar tissue from the wound bed.  Total area debrided, 420 cm².  Debridement carried into the subcutaneous tissue layer.   -Bleeding controlled with manual pressure.    Wound care completed by wound RN- refer to flowsheet and note  -Patient tolerated the procedure well, without significant c/o pain or discomfort.        Post debridement measurements: 29 x 14.5 x 0.3 cm     Post debridement left lower leg posterior post debride        Left leg posterior lateral post debride      left leg posterior medial post debridement                          ASSESSMENT/PLAN:  65-year-old male with homelessness, EtOH use, tobacco use, and chronic left leg ulcer.  Wound has decreased in size, no longer odorous.  Completed antibiotic course.  Recommend continuation of veraflo wound VAC and 2 layer compression wrap with weekly excisional debridements.      -continue veraflo VAC with 2 layer compression wrap. veraflo VAC to be continued while pt is admitted.    -Does not need veraflo VAC at discharge.   -No signs and symptoms of infection.  Completed antibiotic course on 10/5/2020.  - Patient to continue to be seen by wound care team while admitted  Patient to continue to be followed by wound care nurse practitioner as he requires serial excisional debridements        Discharge plan:  Pending guardianship      D/W: Patient, RN, Sera wound care EKATERINA

## 2020-10-07 NOTE — CARE PLAN
Problem: Infection  Goal: Will remain free from infection  Outcome: PROGRESSING AS EXPECTED   Educate patient on proper hand-washing techniques. Monitor for signs and symptoms of infection. Wound vac is working adequately.   Problem: Pain Management  Goal: Pain level will decrease to patient's comfort goal  Outcome: PROGRESSING AS EXPECTED   Patient pain is well managed on oral medication

## 2020-10-07 NOTE — WOUND TEAM
[facility]  Inpatient Services   Wound and Skin Care Progress Note    Admission Date: 8/7/2020     Last order of IP CONSULT TO WOUND CARE was found on 9/1/2020 from Hospital Encounter on 8/7/2020     HPI, PMH, SH: Reviewed    Unit where seen by Wound Team: [id number]     WOUND CONSULT/FOLLOW UP RELATED TO:  Left leg scheduled NPWT change     Self Report / Pain Level:  States 10/10 with oral pre-medication per nursing    OBJECTIVE:  Vac dressing in place with 2 layer compression.    WOUND TYPE, LOCATION, CHARACTERISTICS (Pressure Injuries: location, stage, POA or date identified)    Negative Pressure Wound Therapy 09/03/20 Leg Lower;Medial;Posterior Left (Active)   NPWT Pump Mode / Pressure Setting Ulta;Continuous;125 mmHg 10/07/20 1100   Dressing Type Medium;Black Foam (Veraflo) 10/07/20 1100   Number of Foam Pieces Used 3 10/07/20 1100   Canister Changed No 10/07/20 1100   Output (mL) 1000 mL 10/06/20 2018   NEXT Dressing Change/Treatment Date 10/09/20 10/07/20 1100   VAC VeraFlo Irrigant Normal Saline 10/07/20 1100   VAC VeraFlo Soak Time (mins) 6 10/07/20 1100   VAC VeraFlo Instill Volume (ml) 24 10/07/20 1100   VAC VeraFlo - Therapy Time (hrs) 1 10/07/20 1100   VAC VeraFlo Pressure (mm/Hg) Continuous;125 mmHg 10/07/20 1100   WOUND NURSE ONLY - Time Spent with Patient (mins) 90 10/07/20 1100      Wound 08/07/20 Full Thickness Wound Leg LLE posterior, extends medially and laterally (Active)   Wound Image      10/07/20 1100   Site Assessment Red 10/07/20 1100   Periwound Assessment Clean;Dry;Intact;Scar tissue 10/07/20 1100   Margins Attached edges;Defined edges 10/07/20 1100   Closure Secondary intention 10/07/20 1100   Drainage Amount Moderate 10/07/20 1100   Drainage Description Serosanguineous 10/07/20 1100   Treatments Cleansed;CSWD - Conservative Sharp Wound Debridement;Irrigation;Site care;Compression 10/07/20 1100   Wound Cleansing Approved Wound Cleanser 10/07/20 1100   Periwound Protectant  Skin Protectant Wipes to Periwound;Paste Ring;Drape 10/07/20 1100   Dressing Cleansing/Solutions Normal Saline 10/07/20 1100   Dressing Options Wound Vac;Compression Wrap Two Layer 10/07/20 1100   Dressing Changed Changed 10/07/20 1100   Dressing Status Clean;Dry;Intact 10/07/20 1100   Dressing Change/Treatment Frequency By Wound Team Only 10/07/20 1100   NEXT Dressing Change/Treatment Date 10/09/20 10/07/20 1100   NEXT Weekly Photo (Inpatient Only) 10/14/20 10/07/20 1100   Non-staged Wound Description Full thickness 10/07/20 1100   Wound Length (cm) 29 cm 10/07/20 1100   Wound Width (cm) 13.5 cm 10/07/20 1100   Wound Depth (cm) 0.2 cm 10/07/20 1100   Wound Surface Area (cm^2) 391.5 cm^2 10/07/20 1100   Wound Volume (cm^3) 78.3 cm^3 10/07/20 1100   Post-Procedure Length (cm) 29 cm 10/07/20 1100   Post-Procedure Width (cm) 14.5 cm 10/07/20 1100   Post-Procedure Depth (cm) 0.3 cm 10/07/20 1100   Post-Procedure Surface Area (cm^2) 420.5 cm^2 10/07/20 1100   Post-Procedure Volume (cm^3) 126.15 cm^3 10/07/20 1100   Wound Healing % -49 10/07/20 1100   Wound Bed Granulation (%) 30 % 10/07/20 1100   Wound Bed Epithelium (%) 0 % 09/01/20 1300   Wound Bed Slough (%) 70 % 10/07/20 1100   Wound Bed Granulation (%) - Post-Procedure 100 % 10/07/20 1100   Wound Bed Epithelium % - (Post-Procedure) 0 % 09/01/20 1300   Wound Bed Slough % - (Post-Procedure) 0 % 10/07/20 1100   Wound Bed Eschar % - (Post-Procedure) 0 % 09/01/20 1300   Tunneling (cm) 0 cm 09/03/20 1300   Undermining (cm) 0 cm 09/03/20 1300   Shape Irregular 10/07/20 1100   Wound Odor None 10/07/20 1100   Pulses Left;2+;DP;PT 10/07/20 1100   Exposed Structures None 10/07/20 1100   WOUND NURSE ONLY - Time Spent with Patient (mins) 90 10/07/20 1100              KOSTAS:   KOSTAS Results, Last 30 Days Us-kostas Single Level Bilat    Result Date: 9/2/2020  Narrative  Vascular Laboratory  Conclusions  1.  Normal bilateral KOSTAS's.  GRUPO GANN  Age:    64    Gender:     M  MRN:     [id number]  :    1955      BSA:  Exam Date:     2020 09:59  Room #:     Inpatient  Priority:     Routine  Ht (in):             Wt (lb):  Ordering Physician:        EDDA CANADA  Referring Physician:       [id number]  Sonographer:               Deja Ellis RDMS                             RVT  Study Type:                Complete Bilateral  Technical Quality:         Adequate  Indications:     Ulceration of LE  CPT Codes:       82012  ICD Codes:       707.1  History:         Nonhealing wound of left lower extremity.  Limitations:                 RIGHT  Waveform            Systolic BPs (mmHg)                             117           Brachial  Triphasic                                Common Femoral  Triphasic                  138           Posterior Tibial  Triphasic                  132           Dorsalis Pedis                                           Peroneal                             1.18          KOSTAS                                           TBI                       LEFT  Waveform        Systolic BPs (mmHg)                             114           Brachial  Triphasic                                Common Femoral  Triphasic                  127           Posterior Tibial  Triphasic                  122           Dorsalis Pedis                                           Peroneal                             1.09          KOSTAS                                           TBI  Findings  Right.  Doppler waveforms of the common femoral artery are of high amplitude and  triphasic.  Doppler waveforms at the ankle are brisk and triphasic.  Ankle-brachial index is normal.  Left.  Doppler waveforms of the common femoral artery are of high amplitude and  triphasic.  Doppler waveforms at the ankle are brisk and triphasic.  Ankle-brachial index is normal.  Unable to obtained popliteal waveforms due to wound bandages.  Additional  testing was not performed in accordance with lower extremity  arterial evaluation protocol.  Clover Maya  (Electronically Signed)  Final Date:      02 September 2020                   11:14    Lab Values:    Lab Results   Component Value Date/Time    WBC 5.5 09/30/2020 09:23 AM    RBC 3.74 (L) 09/30/2020 09:23 AM    HEMOGLOBIN 10.8 (L) 09/30/2020 09:23 AM    HEMATOCRIT 32.6 (L) 09/30/2020 09:23 AM    CREACTPROT 1.07 (H) 08/12/2020 01:55 PM    SEDRATEWES 85 (H) 08/07/2020 01:20 PM    HBA1C 5.7 (H) 11/09/2018 06:30 PM        Culture Results show:  Recent Results (from the past 720 hour(s))   CULTURE WOUND W/ GRAM STAIN    Collection Time: 09/01/20  2:00 PM    Specimen: Left Leg; Wound   Result Value Ref Range    Significant Indicator POS (POS)     Source WND     Site LEFT LEG     Culture Result - (A)     Gram Stain Result       Moderate Gram positive cocci.  Moderate Gram positive rods.      Culture Result (A)      Beta Hemolytic Streptococcus group A  Moderate growth      Culture Result (A)      Methicillin Resistant Staphylococcus aureus  Moderate growth      Culture Result (A)      Diphtheroids  Moderate growth  Mixed morphologies.         INTERVENTIONS BY WOUND TEAM:    Patient removed pants, 2 layer compression wrap removed and left LE was washed with foam wash and warm washcloth, air dried.  Had patient lay on his stomach.  Wound vac dressing removed and wound bed cleansed with NS and gauze.  Excisional debridement performed by Asaf ELLER for epibole edges and to debride scar tissue edges.  Meredith-wound prepped with no sting skin prep and paste ring.  Black veraflo foam to wound bed, 3 veraflo black foam pieces, sealed with drape and TRAC pad applied to proximal wound bed at thigh. Sacral mepilex applied superior thigh and tubing applied to mepilex   Resumed Veraflo,veraflo settings 24mL for 6 minutes instillation hourly.  No leaks noted. 2 layer compression wrap applied to left LE.    Interdisciplinary  consultation: Patient, Bedside RN    EVALUATION / RATIONALE FOR TREATMENT:    10/7: Excisional debridement performed by Asaf ELLER. Will need to continue selective debridement with NPWT changes, and weekly excisional debridement with APRN to flatten out the scar tissue.  IV abx therapy ended 10/5.   10/5: Lateral wound still with some slough, both wounds smaller per measurements. Medial wound with all granular tissue.  9/30: Patient to start abx therapy for 7 days course per Dr. Ellis.  Started dakin's instillation to veraflo  9/28: malodorous today, culture taken.    9/25: Odor noted, though unclear if from wound or pt, consider shower before next Vac change. Consider regular vac next change, wound granular and superficial.   9/23: Patient still awaiting guardianship for placement.   9/18: wound granulating nicely and responding well to current treatment.    9/16: Wound bed with granular tissue, edges are thick but appear better than previous assessments. . NPWT VF to encourage closure by secondary intention and manage drainage while encouraging oxygenation and granulation to the wound bed. 2 layer compression to assist in decrease of swelling and encourage blood flow to wounds. Wound team to follow up and change 3x/week.      Goals: Steady decrease in wound area and depth weekly.    NURSING PLAN OF CARE ORDERS (X):    Dressing changes: See Dressing Care orders: X  Skin care: See Skin Care orders:   Rectal tube care: See Rectal Tube Care orders:   Other orders:      WOUND TEAM PLAN OF CARE:   Dressing changes by wound team:                   Follow up 3 times weekly:                NPWT change 3 times weekly:   X  Follow up 1-2 times weekly:      Follow up Bi-Monthly:                   Follow up as needed:       Other (explain):     Anticipated discharge plans: pending guardianship.   LTACH:        SNF/Rehab: X - patient will need ongoing wound care for LLE upon discharge - 3x/week.                 Home  Health Care:           Outpatient Wound Center:            Self Care:

## 2020-10-08 PROCEDURE — 700102 HCHG RX REV CODE 250 W/ 637 OVERRIDE(OP): Performed by: NURSE PRACTITIONER

## 2020-10-08 PROCEDURE — A9270 NON-COVERED ITEM OR SERVICE: HCPCS | Performed by: NURSE PRACTITIONER

## 2020-10-08 PROCEDURE — 770021 HCHG ROOM/CARE - ISO PRIVATE

## 2020-10-08 PROCEDURE — 700105 HCHG RX REV CODE 258

## 2020-10-08 RX ORDER — SODIUM CHLORIDE 9 MG/ML
INJECTION, SOLUTION INTRAVENOUS
Status: COMPLETED
Start: 2020-10-08 | End: 2020-10-08

## 2020-10-08 RX ADMIN — OXYCODONE HYDROCHLORIDE 5 MG: 5 TABLET ORAL at 01:13

## 2020-10-08 RX ADMIN — RISPERIDONE 0.5 MG: 0.5 TABLET ORAL at 04:45

## 2020-10-08 RX ADMIN — GABAPENTIN 100 MG: 100 CAPSULE ORAL at 04:44

## 2020-10-08 RX ADMIN — GABAPENTIN 100 MG: 100 CAPSULE ORAL at 17:07

## 2020-10-08 RX ADMIN — OXYCODONE HYDROCHLORIDE 5 MG: 5 TABLET ORAL at 17:07

## 2020-10-08 RX ADMIN — DIVALPROEX SODIUM 125 MG: 125 CAPSULE ORAL at 20:20

## 2020-10-08 RX ADMIN — OXYCODONE HYDROCHLORIDE 5 MG: 5 TABLET ORAL at 12:53

## 2020-10-08 RX ADMIN — Medication 400 MG: at 04:44

## 2020-10-08 RX ADMIN — DIVALPROEX SODIUM 125 MG: 125 CAPSULE ORAL at 04:44

## 2020-10-08 RX ADMIN — OXYCODONE HYDROCHLORIDE 5 MG: 5 TABLET ORAL at 04:44

## 2020-10-08 RX ADMIN — RISPERIDONE 1 MG: 1 TABLET ORAL at 17:07

## 2020-10-08 RX ADMIN — OXYCODONE HYDROCHLORIDE 5 MG: 5 TABLET ORAL at 23:42

## 2020-10-08 RX ADMIN — OXYCODONE HYDROCHLORIDE 5 MG: 5 TABLET ORAL at 20:20

## 2020-10-08 RX ADMIN — DIVALPROEX SODIUM 125 MG: 125 CAPSULE ORAL at 12:53

## 2020-10-08 RX ADMIN — GABAPENTIN 100 MG: 100 CAPSULE ORAL at 12:53

## 2020-10-08 RX ADMIN — Medication 400 MG: at 17:07

## 2020-10-08 RX ADMIN — AMLODIPINE BESYLATE 5 MG: 5 TABLET ORAL at 04:45

## 2020-10-08 RX ADMIN — SODIUM CHLORIDE 500 ML: 9 INJECTION, SOLUTION INTRAVENOUS at 20:20

## 2020-10-08 ASSESSMENT — PAIN DESCRIPTION - PAIN TYPE
TYPE: CHRONIC PAIN

## 2020-10-08 NOTE — CARE PLAN
Problem: Pain Management  Goal: Pain level will decrease to patient's comfort goal  Outcome: PROGRESSING AS EXPECTED  Pain controlled with standing and prn medication.      Problem: Respiratory:  Goal: Respiratory status will improve  Outcome: PROGRESSING AS EXPECTED   Comfortable on room air .

## 2020-10-08 NOTE — CARE PLAN
Problem: Safety  Goal: Will remain free from falls  Outcome: PROGRESSING AS EXPECTED   Patient has 1:1 safety sitter at bedside for confusion and elopement risk. Educated to call staff for any needs.     Problem: Venous Thromboembolism (VTW)/Deep Vein Thrombosis (DVT) Prevention:  Goal: Patient will participate in Venous Thrombosis (VTE)/Deep Vein Thrombosis (DVT)Prevention Measures  Outcome: PROGRESSING AS EXPECTED   Patient refuses SCDs, but walks around room and adjusts self in bed very frequently.

## 2020-10-09 PROCEDURE — 770021 HCHG ROOM/CARE - ISO PRIVATE

## 2020-10-09 PROCEDURE — A9270 NON-COVERED ITEM OR SERVICE: HCPCS | Performed by: NURSE PRACTITIONER

## 2020-10-09 PROCEDURE — 700102 HCHG RX REV CODE 250 W/ 637 OVERRIDE(OP): Performed by: NURSE PRACTITIONER

## 2020-10-09 PROCEDURE — 302098 PASTE RING (FLAT): Performed by: NURSE PRACTITIONER

## 2020-10-09 PROCEDURE — 97606 NEG PRS WND THER DME>50 SQCM: CPT

## 2020-10-09 RX ORDER — SODIUM CHLORIDE 9 MG/ML
INJECTION, SOLUTION INTRAVENOUS
Status: DISCONTINUED
Start: 2020-10-09 | End: 2020-10-10

## 2020-10-09 RX ADMIN — GABAPENTIN 100 MG: 100 CAPSULE ORAL at 06:10

## 2020-10-09 RX ADMIN — Medication 400 MG: at 17:04

## 2020-10-09 RX ADMIN — GABAPENTIN 100 MG: 100 CAPSULE ORAL at 17:04

## 2020-10-09 RX ADMIN — ACETAMINOPHEN 650 MG: 325 TABLET, FILM COATED ORAL at 20:27

## 2020-10-09 RX ADMIN — Medication 400 MG: at 06:10

## 2020-10-09 RX ADMIN — RISPERIDONE 1 MG: 1 TABLET ORAL at 17:04

## 2020-10-09 RX ADMIN — RISPERIDONE 0.5 MG: 0.5 TABLET ORAL at 06:10

## 2020-10-09 RX ADMIN — OXYCODONE HYDROCHLORIDE 5 MG: 5 TABLET ORAL at 12:26

## 2020-10-09 RX ADMIN — DIVALPROEX SODIUM 125 MG: 125 CAPSULE ORAL at 06:10

## 2020-10-09 RX ADMIN — OXYCODONE HYDROCHLORIDE 5 MG: 5 TABLET ORAL at 09:21

## 2020-10-09 RX ADMIN — DIVALPROEX SODIUM 125 MG: 125 CAPSULE ORAL at 14:11

## 2020-10-09 RX ADMIN — OXYCODONE HYDROCHLORIDE 5 MG: 5 TABLET ORAL at 02:56

## 2020-10-09 RX ADMIN — OXYCODONE HYDROCHLORIDE 5 MG: 5 TABLET ORAL at 17:04

## 2020-10-09 RX ADMIN — AMLODIPINE BESYLATE 5 MG: 5 TABLET ORAL at 06:10

## 2020-10-09 RX ADMIN — OXYCODONE HYDROCHLORIDE 5 MG: 5 TABLET ORAL at 06:10

## 2020-10-09 RX ADMIN — GABAPENTIN 100 MG: 100 CAPSULE ORAL at 11:47

## 2020-10-09 RX ADMIN — DIVALPROEX SODIUM 125 MG: 125 CAPSULE ORAL at 20:27

## 2020-10-09 RX ADMIN — OXYCODONE HYDROCHLORIDE 5 MG: 5 TABLET ORAL at 20:27

## 2020-10-09 ASSESSMENT — PAIN DESCRIPTION - PAIN TYPE
TYPE: CHRONIC PAIN

## 2020-10-09 NOTE — WOUND TEAM
[facility]  Inpatient Services   Wound and Skin Care Progress Note    Admission Date: 8/7/2020     Last order of IP CONSULT TO WOUND CARE was found on 9/1/2020 from Hospital Encounter on 8/7/2020     HPI, PMH, SH: Reviewed    Unit where seen by Wound Team: [id number]     WOUND CONSULT/FOLLOW UP RELATED TO:  Left leg scheduled NPWT change     Self Report / Pain Level: Minimal c/o pain.    OBJECTIVE:  Vac dressing in place, 2 layer removed by RN for shower.    WOUND TYPE, LOCATION, CHARACTERISTICS (Pressure Injuries: location, stage, POA or date identified)    Negative Pressure Wound Therapy 09/03/20 Leg Lower;Medial;Posterior Left (Active)   NPWT Pump Mode / Pressure Setting Intermittent;125 mmHg 10/09/20     Dressing Type Medium;Black Foam (Veraflo)     Number of Foam Pieces Used 3    Canister Changed No    NEXT Dressing Change/Treatment Date 10/12/20    VAC VeraFlo Irrigant Normal Saline    VAC VeraFlo Soak Time (mins) 6    VAC VeraFlo Instill Volume (ml) 24    VAC VeraFlo - Therapy Time (hrs) 1    VAC VeraFlo Pressure (mm/Hg) Intermittent;125 mmHg         Wound 08/07/20 Full Thickness Wound Leg LLE posterior, extends medially and laterally (Active)   Wound Image      10/07/20     Site Assessment Pale;Pink;Red;Early/partial granulation 10/09/20     Periwound Assessment Clean;Intact;Dry;Scar tissue     Margins Attached edges;Defined edges    Closure Secondary intention    Drainage Amount Scant    Drainage Description Serosanguineous    Treatments Cleansed;Site care    Wound Cleansing Normal Saline Irrigation    Periwound Protectant Drape;Benzoin;Paste Ring    Dressing Cleansing/Solutions Normal Saline    Dressing Options Wound Vac    Dressing Changed Changed    Dressing Status Clean;Dry;Intact    Dressing Change/Treatment Frequency By Wound Team Only    NEXT Dressing Change/Treatment Date 10/12/20    NEXT Weekly Photo (Inpatient Only) 10/14/20    Non-staged Wound Description Full thickness    Wound  Length (cm) 29 cm 10/07/20     Wound Width (cm) 13.5 cm     Wound Depth (cm) 0.2 cm    Wound Surface Area (cm^2) 391.5 cm^2    Wound Volume (cm^3) 78.3 cm^3    Post-Procedure Length (cm) 29 cm    Post-Procedure Width (cm) 14.5 cm    Post-Procedure Depth (cm) 0.3 cm    Post-Procedure Surface Area (cm^2) 420.5 cm^2    Post-Procedure Volume (cm^3) 126.15 cm^3    Shape Irregular 10/09/20     Wound Odor None     Pulses Left;2+;DP;PT    Exposed Structures None         CESAR:   CESAR Results, Last 30 Days Us-cesar Single Level Bilat    Result Date: 2020  Narrative  Vascular Laboratory  Conclusions  1.  Normal bilateral CESAR's.  GRUPO GANN  Age:    64    Gender:     M  MRN:    [id number]  :    1955      BSA:  Exam Date:     2020 09:59  Room #:     Inpatient  Priority:     Routine  Ht (in):             Wt (lb):  Ordering Physician:        EDDA CANADA  Referring Physician:       [id number] Morrissey  Sonographer:               Deja Ellis RDMS                             RVT  Study Type:                Complete Bilateral  Technical Quality:         Adequate  Indications:     Ulceration of LE  CPT Codes:       27660  ICD Codes:       707.1  History:         Nonhealing wound of left lower extremity.  Limitations:                 RIGHT  Waveform            Systolic BPs (mmHg)                             117           Brachial  Triphasic                                Common Femoral  Triphasic                  138           Posterior Tibial  Triphasic                  132           Dorsalis Pedis                                           Peroneal                             1.18          CESAR                                           TBI                       LEFT  Waveform        Systolic BPs (mmHg)                             114           Brachial  Triphasic                                Common Femoral  Triphasic                  127            Posterior Tibial  Triphasic                  122           Dorsalis Pedis                                           Peroneal                             1.09          KOSTAS                                           TBI  Findings  Right.  Doppler waveforms of the common femoral artery are of high amplitude and  triphasic.  Doppler waveforms at the ankle are brisk and triphasic.  Ankle-brachial index is normal.  Left.  Doppler waveforms of the common femoral artery are of high amplitude and  triphasic.  Doppler waveforms at the ankle are brisk and triphasic.  Ankle-brachial index is normal.  Unable to obtained popliteal waveforms due to wound bandages.  Additional testing was not performed in accordance with lower extremity  arterial evaluation protocol.  Clover Maya  (Electronically Signed)  Final Date:      02 September 2020                   11:14    Lab Values:    Lab Results   Component Value Date/Time    WBC 5.5 09/30/2020 09:23 AM    RBC 3.74 (L) 09/30/2020 09:23 AM    HEMOGLOBIN 10.8 (L) 09/30/2020 09:23 AM    HEMATOCRIT 32.6 (L) 09/30/2020 09:23 AM    CREACTPROT 1.07 (H) 08/12/2020 01:55 PM    SEDRATEWES 85 (H) 08/07/2020 01:20 PM    HBA1C 5.7 (H) 11/09/2018 06:30 PM        Culture Results show:  Recent Results (from the past 720 hour(s))   CULTURE WOUND W/ GRAM STAIN    Collection Time: 09/01/20  2:00 PM    Specimen: Left Leg; Wound   Result Value Ref Range    Significant Indicator POS (POS)     Source WND     Site LEFT LEG     Culture Result - (A)     Gram Stain Result       Moderate Gram positive cocci.  Moderate Gram positive rods.      Culture Result (A)      Beta Hemolytic Streptococcus group A  Moderate growth      Culture Result (A)      Methicillin Resistant Staphylococcus aureus  Moderate growth      Culture Result (A)      Diphtheroids  Moderate growth  Mixed morphologies.         INTERVENTIONS BY WOUND TEAM:    Pt had shower, 2 layer removed prior to shower.  Vac dressing removed and wound and  addison wound cleansed with wound cleanser.  Addison-wound prepped with benzoin and paste ring.  Black veraflo foam to wound bed, 3 veraflo black foam pieces, sealed with drape and TRAC pad applied to proximal wound bed at thigh. Sacral mepilex applied superior thigh and tubing applied to mepilex   Resumed Veraflo,veraflo settings 24mL for 6 minutes instillation hourly.  No leaks noted. 2 layer compression wrap applied to left LE.    Interdisciplinary consultation: Patient, Bedside RN    EVALUATION / RATIONALE FOR TREATMENT:  Pt has 100% granular wound bed, no odor noted.   10/7: Excisional debridement performed by Asaf ELLER. Will need to continue selective debridement with NPWT changes, and weekly excisional debridement with APRN to flatten out the scar tissue.  IV abx therapy ended 10/5.   10/5: Lateral wound still with some slough, both wounds smaller per measurements. Medial wound with all granular tissue.  9/30: Patient to start abx therapy for 7 days course per Dr. Ellis.  Started dakin's instillation to veraflo  9/28: malodorous today, culture taken.    9/25: Odor noted, though unclear if from wound or pt, consider shower before next Vac change. Consider regular vac next change, wound granular and superficial.   9/23: Patient still awaiting guardianship for placement.   9/18: wound granulating nicely and responding well to current treatment.    9/16: Wound bed with granular tissue, edges are thick but appear better than previous assessments. . NPWT VF to encourage closure by secondary intention and manage drainage while encouraging oxygenation and granulation to the wound bed. 2 layer compression to assist in decrease of swelling and encourage blood flow to wounds. Wound team to follow up and change 3x/week.      Goals: Steady decrease in wound area and depth weekly.    NURSING PLAN OF CARE ORDERS (X):    Dressing changes: See Dressing Care orders: X  Skin care: See Skin Care orders:   Rectal tube care:  See Rectal Tube Care orders:   Other orders:      WOUND TEAM PLAN OF CARE:   Dressing changes by wound team:                   Follow up 3 times weekly:                NPWT change 3 times weekly:   X  Follow up 1-2 times weekly:      Follow up Bi-Monthly:                   Follow up as needed:       Other (explain):     Anticipated discharge plans: pending guardianship.   LTACH:        SNF/Rehab: X - patient will need ongoing wound care for LLE upon discharge - 3x/week.                 Home Health Care:           Outpatient Wound Center:            Self Care:

## 2020-10-09 NOTE — CARE PLAN
Problem: Safety  Goal: Will remain free from falls  Outcome: PROGRESSING AS EXPECTED   Patient has safety sitter at bedside for safety and elopement risk.    Problem: Mobility  Goal: Risk for activity intolerance will decrease  Outcome: PROGRESSING AS EXPECTED   Patient walks around room frequently.    hard copy, drawn during this pregnancy

## 2020-10-10 PROCEDURE — A9270 NON-COVERED ITEM OR SERVICE: HCPCS | Performed by: NURSE PRACTITIONER

## 2020-10-10 PROCEDURE — 700102 HCHG RX REV CODE 250 W/ 637 OVERRIDE(OP): Performed by: NURSE PRACTITIONER

## 2020-10-10 PROCEDURE — 700105 HCHG RX REV CODE 258

## 2020-10-10 PROCEDURE — 99231 SBSQ HOSP IP/OBS SF/LOW 25: CPT | Performed by: NURSE PRACTITIONER

## 2020-10-10 PROCEDURE — 770021 HCHG ROOM/CARE - ISO PRIVATE

## 2020-10-10 RX ORDER — SODIUM CHLORIDE 9 MG/ML
INJECTION, SOLUTION INTRAVENOUS
Status: COMPLETED
Start: 2020-10-10 | End: 2020-10-10

## 2020-10-10 RX ADMIN — DIVALPROEX SODIUM 125 MG: 125 CAPSULE ORAL at 14:26

## 2020-10-10 RX ADMIN — GABAPENTIN 100 MG: 100 CAPSULE ORAL at 11:54

## 2020-10-10 RX ADMIN — RISPERIDONE 1 MG: 1 TABLET ORAL at 16:44

## 2020-10-10 RX ADMIN — GABAPENTIN 100 MG: 100 CAPSULE ORAL at 05:13

## 2020-10-10 RX ADMIN — Medication 400 MG: at 16:45

## 2020-10-10 RX ADMIN — GABAPENTIN 100 MG: 100 CAPSULE ORAL at 16:45

## 2020-10-10 RX ADMIN — OXYCODONE HYDROCHLORIDE 5 MG: 5 TABLET ORAL at 01:49

## 2020-10-10 RX ADMIN — OXYCODONE HYDROCHLORIDE 5 MG: 5 TABLET ORAL at 14:26

## 2020-10-10 RX ADMIN — RISPERIDONE 0.5 MG: 0.5 TABLET ORAL at 05:13

## 2020-10-10 RX ADMIN — Medication 400 MG: at 05:13

## 2020-10-10 RX ADMIN — DIVALPROEX SODIUM 125 MG: 125 CAPSULE ORAL at 05:13

## 2020-10-10 RX ADMIN — SODIUM CHLORIDE 1000 ML: 9 INJECTION, SOLUTION INTRAVENOUS at 23:36

## 2020-10-10 RX ADMIN — OXYCODONE HYDROCHLORIDE 5 MG: 5 TABLET ORAL at 21:48

## 2020-10-10 RX ADMIN — OXYCODONE HYDROCHLORIDE 5 MG: 5 TABLET ORAL at 18:53

## 2020-10-10 RX ADMIN — DIVALPROEX SODIUM 125 MG: 125 CAPSULE ORAL at 21:48

## 2020-10-10 ASSESSMENT — PAIN DESCRIPTION - PAIN TYPE
TYPE: CHRONIC PAIN
TYPE: ACUTE PAIN;CHRONIC PAIN
TYPE: ACUTE PAIN;CHRONIC PAIN

## 2020-10-10 ASSESSMENT — ENCOUNTER SYMPTOMS
MYALGIAS: 0
FEVER: 0
BLURRED VISION: 0
CONSTIPATION: 0
PALPITATIONS: 0
INSOMNIA: 0
CHILLS: 0
HEADACHES: 0
ROS SKIN COMMENTS: LLE WOUND
FALLS: 0
NERVOUS/ANXIOUS: 0
DOUBLE VISION: 0
NAUSEA: 0
VOMITING: 0
SHORTNESS OF BREATH: 0
DIZZINESS: 0

## 2020-10-10 NOTE — CARE PLAN
Problem: Safety  Goal: Will remain free from falls  Note: Sitter at all times     Problem: Pain Management  Goal: Pain level will decrease to patient's comfort goal  Note: Prn meds per MAR

## 2020-10-10 NOTE — PROGRESS NOTES
Received in bed sleeping with no s/s of discomfort, sitter in room, wound vac intact, needs attended, awaiting guardianship.

## 2020-10-10 NOTE — PROGRESS NOTES
Hospital Medicine Twice Weekly Progress Note    Date of Service  10/10/2020    Chief Complaint  LLE Wound    Hospital Course   Mr. Varela is a 64-year-old male with PMH significant for homelessness, etoh use, tobacco dependence and chronic LLE wound who presented 8/7/2020 with LLE wound infection.  He was hospitalized at our facility in April and evaluated by orthopedic surgery who recommended wound care.  Wound cultures at that time grew MSSA and strep. Patient reported losing his Medicaid card and having no transportation to wound clinic.  He was seen at [facility] earlier this week and prescribed ABX, however he has not filled the prescription.  US LLE negative for DVT.  Treated for sepsis with empiric ABX and wound care. He completed ABX course 9/16. He was found to have head lice and underwent treatments. Continued to have intermittent agitation so was started on risperdol, depakote and gabapentin per psych recs.  He has been deemed incapacitated to make medical decisions and is currently pending guardianship and placement.  He is medically stable and will be only be evaluated 2 times weekly unless there is a clinical change. Repeat wound culture 9/28 grew Strep A and proteus. ID was consulted and recommended 5-day IV ABX with Zosyn - stop date 10/5.       Interval Problem Update  10/7:  Flu vaccine ordered.  Completed IV antibiotic regimen.  Currently resting in bed.  No acute needs.  No evidence of uncontrolled pain.  VSS.  Wound vac in place.   10/10:  Patient resting in bed.  Wound vac in place.  He did have debridement this week.  Denies pain.  He asks if he will be staying in the hospital today and I confirmed that he would be.  No acute needs.     Consultants/Specialty  Infectious Disease  Psychiatry    Code Status  Full Code    Disposition  Pending guardianship and placement - likely Group Home     Review of Systems  Review of Systems   Constitutional: Positive for malaise/fatigue. Negative  for chills and fever.   HENT: Negative.  Negative for congestion.    Eyes: Negative for blurred vision and double vision.   Respiratory: Negative for shortness of breath.    Cardiovascular: Negative for chest pain, palpitations and leg swelling.   Gastrointestinal: Negative for constipation, nausea and vomiting.   Genitourinary: Negative for dysuria and urgency.   Musculoskeletal: Negative for falls, joint pain and myalgias.   Skin:        LLE wound   Neurological: Negative for dizziness and headaches.   Psychiatric/Behavioral: The patient is not nervous/anxious and does not have insomnia.    All other systems reviewed and are negative.       Physical Exam  Temp:  [36.4 °C (97.5 °F)-37 °C (98.6 °F)] 37 °C (98.6 °F)  Pulse:  [77-82] 77  Resp:  [17-18] 17  BP: ()/(56-70) 98/56  SpO2:  [93 %-94 %] 94 %    Physical Exam  Vitals signs and nursing note reviewed. Exam conducted with a chaperone present.   Constitutional:       General: He is awake. He is not in acute distress.     Appearance: Normal appearance. He is underweight. He is not toxic-appearing.   HENT:      Head: Normocephalic and atraumatic.      Nose: Nose normal. No congestion.      Mouth/Throat:      Lips: Pink.      Mouth: Mucous membranes are moist.      Pharynx: Oropharynx is clear. No oropharyngeal exudate.   Eyes:      Conjunctiva/sclera: Conjunctivae normal.   Neck:      Musculoskeletal: Normal range of motion. No neck rigidity.   Cardiovascular:      Rate and Rhythm: Normal rate.      Heart sounds: Normal heart sounds. No murmur.   Pulmonary:      Effort: Pulmonary effort is normal. No respiratory distress.      Breath sounds: No wheezing.   Abdominal:      General: Bowel sounds are normal. There is no distension.      Tenderness: There is no abdominal tenderness. There is no guarding.   Musculoskeletal: Normal range of motion.         General: No swelling or tenderness.      Right lower leg: No edema.      Left lower leg: No edema.   Skin:      General: Skin is warm and dry.      Comments: Left leg wound with wound vac in place.    Neurological:      Mental Status: He is alert.      Gait: Gait normal.      Comments: Oriented x 2    Psychiatric:         Mood and Affect: Mood and affect normal.         Behavior: Behavior is not aggressive or combative. Behavior is cooperative.         Cognition and Memory: Cognition is impaired.         Judgment: Judgment is impulsive.      Comments:            Fluids    Intake/Output Summary (Last 24 hours) at 10/10/2020 0828  Last data filed at 10/10/2020 0500  Gross per 24 hour   Intake 240 ml   Output --   Net 240 ml       Laboratory                        Imaging  IR-MIDLINE CATHETER INSERTION WO GUIDANCE > AGE 3   Final Result                  Ultrasound-guided midline placement performed by qualified nursing staff    as above.          US-KOSTAS SINGLE LEVEL BILAT   Final Result      CT-HEAD W/O   Final Result      No acute intracranial abnormality      DX-TIBIA AND FIBULA LEFT   Final Result      1.  Healed distal metaphyseal fractures of the left fibula and tibia with tibial IM layla in place.      2.  No acute fracture or dislocation.      3.  No radiopaque soft tissue foreign body.      DX-FEMUR-2+ LEFT   Final Result      1.  No radiographic evidence of acute traumatic injury left femur.      2.  Unchanged cortical thickening in the posterior aspect of the distal femoral diaphysis which may represent sequela of prior injury or infection.      3.  No soft tissue foreign body identified.      US-EXTREMITY VENOUS LOWER UNILAT LEFT   Final Result      DX-CHEST-PORTABLE (1 VIEW)   Final Result      No acute cardiopulmonary abnormality.           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   -blood cultures (-)   -afebrile. No Leukocytosis   -LPS following for serial  debridements  -Continue wound vac  -Cx 9/1 Strep, MRSA, diptheroids: completed Zyvox 9/2 thru 9/16  -cx 9/28 strep A and proteus - ID consulted and recommended course of IV zosyn completed on 10/5.    Encephalopathy- (present on admission)  Assessment & Plan  -acute on chronic, likely due Wernicke's   -Psychiatry: incapacitated to make medical decision or leave AMA   -Requires guardianship and placement  -Avoid narcotics and hypnotics.  Cautious use of benzodiazepines for alcohol withdrawal protocol  -Frequent reorientation  -Sleep hygiene    Non-compliance  Assessment & Plan  -likely also component of psychiatric disorder and ETOH abuse  -Psychiatric consulted and suggests patient is incapacitated to make medical decisions or leave AMA  -ongoing encouragement for compliance.    Psychiatric disorder  Assessment & Plan  -unknown/unspecified  -psychiatry consulted and deemed him incapacitated to leave AMA or make medical decisions  -continue risperdol 0.5 -1mg BID, cont depakote 125mg TID added neurotin 100mg TID 8/16  -Pending guardianship. Application submitted 9/3.    Flexion contracture of knee, 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)  Assessment & Plan  -secondary to chronic wound in that area  -PT OT  -encourage mobility    Emphysema/COPD (HCC)  Assessment & Plan  Stable. RA. Chronic w/o exacerbation    Head lice  Assessment & Plan  -he has received 3 treatments  -now resolved      Alcohol dependence (HCC)- (present on admission)  Assessment & Plan  -history of. Cont MV    Protein malnutrition (HCC)  Assessment & Plan  -history of ETOH and homelessness  -Body mass index is 20.33 kg/m².  -TID supplements  -encourage PO intake    Tobacco dependence- (present on admission)  Assessment & Plan  -Nicotine replacement protocol       VTE prophylaxis: Ambulatory    I have performed a physical exam and reviewed and updated ROS and Assessment/Plan today (10/10/20). In review of the previous note there are no changes except as  documented above    I certify the patient requires continued medically necessary hospital services for the treatment of non-healing wound.  The patient will remain in the hospital for the foreseeable future.  Discharge may or may not occur in the next 20 days due to ongoing discharge delays due to having no medically acceptable discharge options.      LOUISE Marroquin.

## 2020-10-10 NOTE — PROGRESS NOTES
Pharmacy Pharmacotherapy Consult for LOS >30 days    Admit Date: 8/7/2020      Medications were reviewed for appropriateness and ongoing need.     Current Facility-Administered Medications   Medication Dose Route Frequency Provider Last Rate Last Dose   • influenza Vac High-Dose Quad (Fluzone) injection 0.7 mL  0.7 mL Intramuscular Once PRN Jeovanny Andres, A.P.R.N.       • oxyCODONE immediate-release (ROXICODONE) tablet 2.5 mg  2.5 mg Oral Q3HRS PRN Demarcus QUINTANA Olddallas, A.P.N.   Stopped at 09/21/20 0755    And   • oxyCODONE immediate-release (ROXICODONE) tablet 5 mg  5 mg Oral Q3HRS PRN Demarcus  Estelle, A.P.N.   5 mg at 10/10/20 1426   • senna-docusate (PERICOLACE or SENOKOT S) 8.6-50 MG per tablet 2 Tab  2 Tab Oral BID PRN Demarcus QUINTANA Olddallas, A.P.N.        And   • polyethylene glycol/lytes (MIRALAX) PACKET 1 Packet  1 Packet Oral QDAY PRN Demarcus  Estelle, A.P.N.   1 Packet at 09/27/20 0508   • risperiDONE (RISPERDAL) tablet 0.5 mg  0.5 mg Oral DAILY Demarcus QUINTANA Olddallas, A.P.N.   0.5 mg at 10/10/20 0513   • risperiDONE (RISPERDAL) tablet 1 mg  1 mg Oral Q EVENING Demarcus QUINTANA Olddallas, A.P.N.   1 mg at 10/09/20 1704   • haloperidol lactate (HALDOL) injection 2-5 mg  2-5 mg Intramuscular Q4HRS PRN Demarcus QUINTANA Olde, A.P.N.   5 mg at 10/06/20 1432   • gabapentin (NEURONTIN) capsule 100 mg  100 mg Oral TID Demarcus  Olde, A.P.N.   100 mg at 10/10/20 1154   • divalproex (DEPAKOTE SPRINKLE) capsule 125 mg  125 mg Oral Q8HRS Demarcus M Olde, A.P.N.   125 mg at 10/10/20 1426   • magnesium oxide (MAG-OX) tablet 400 mg  400 mg Oral BID Demarcus M Olde, A.P.N.   400 mg at 10/10/20 0513   • amLODIPine (NORVASC) tablet 5 mg  5 mg Oral Q DAY Demarcus Mccabe, A.P.N.   Stopped at 10/10/20 0600   • acetaminophen (TYLENOL) tablet 650 mg  650 mg Oral Q6HRS PRN Demarcus Mccabe, A.P.N.   650 mg at 10/09/20 2027       Recommendations:  No recommendations at this time.    Deanna Arce, ChynaD,BCPS

## 2020-10-10 NOTE — CARE PLAN
Problem: Safety  Goal: Will remain free from falls  Outcome: PROGRESSING AS EXPECTED     Problem: Infection  Goal: Will remain free from infection  Outcome: PROGRESSING AS EXPECTED     Problem: Knowledge Deficit  Goal: Knowledge of disease process/condition, treatment plan, diagnostic tests, and medications will improve  Outcome: PROGRESSING AS EXPECTED  Goal: Knowledge of the prescribed therapeutic regimen will improve  Outcome: PROGRESSING AS EXPECTED

## 2020-10-10 NOTE — CARE PLAN
Problem: Safety  Goal: Will remain free from falls  Outcome: PROGRESSING AS EXPECTED     Problem: Infection  Goal: Will remain free from infection  Outcome: PROGRESSING AS EXPECTED     Problem: Pain Management  Goal: Pain level will decrease to patient's comfort goal  Outcome: PROGRESSING AS EXPECTED     Problem: Psychosocial Needs:  Goal: Level of anxiety will decrease  Outcome: PROGRESSING AS EXPECTED     Problem: Mobility  Goal: Risk for activity intolerance will decrease  Outcome: PROGRESSING AS EXPECTED     Problem: Respiratory:  Goal: Respiratory status will improve  Outcome: PROGRESSING AS EXPECTED

## 2020-10-11 PROCEDURE — 700102 HCHG RX REV CODE 250 W/ 637 OVERRIDE(OP): Performed by: NURSE PRACTITIONER

## 2020-10-11 PROCEDURE — A9270 NON-COVERED ITEM OR SERVICE: HCPCS | Performed by: NURSE PRACTITIONER

## 2020-10-11 PROCEDURE — 770021 HCHG ROOM/CARE - ISO PRIVATE

## 2020-10-11 RX ADMIN — OXYCODONE HYDROCHLORIDE 5 MG: 5 TABLET ORAL at 16:11

## 2020-10-11 RX ADMIN — OXYCODONE HYDROCHLORIDE 5 MG: 5 TABLET ORAL at 21:49

## 2020-10-11 RX ADMIN — OXYCODONE HYDROCHLORIDE 5 MG: 5 TABLET ORAL at 03:34

## 2020-10-11 RX ADMIN — RISPERIDONE 1 MG: 1 TABLET ORAL at 16:11

## 2020-10-11 RX ADMIN — OXYCODONE HYDROCHLORIDE 5 MG: 5 TABLET ORAL at 00:22

## 2020-10-11 RX ADMIN — OXYCODONE HYDROCHLORIDE 5 MG: 5 TABLET ORAL at 13:03

## 2020-10-11 RX ADMIN — OXYCODONE HYDROCHLORIDE 5 MG: 5 TABLET ORAL at 06:47

## 2020-10-11 RX ADMIN — DIVALPROEX SODIUM 125 MG: 125 CAPSULE ORAL at 21:49

## 2020-10-11 RX ADMIN — GABAPENTIN 100 MG: 100 CAPSULE ORAL at 13:03

## 2020-10-11 RX ADMIN — DIVALPROEX SODIUM 125 MG: 125 CAPSULE ORAL at 13:03

## 2020-10-11 ASSESSMENT — PAIN DESCRIPTION - PAIN TYPE
TYPE: ACUTE PAIN;CHRONIC PAIN
TYPE: ACUTE PAIN
TYPE: ACUTE PAIN;CHRONIC PAIN
TYPE: ACUTE PAIN

## 2020-10-11 NOTE — CARE PLAN
Problem: Safety  Goal: Will remain free from falls  Outcome: PROGRESSING AS EXPECTED   Treaded socks in place, bed in the lowest position, 1:1 sitter at bedside, call light and belongings within reach, pt call for assistance appropriately   Problem: Discharge Barriers/Planning  Goal: Patient's continuum of care needs will be met  Outcome: PROGRESSING AS EXPECTED   Pending guardianship

## 2020-10-12 PROCEDURE — 700102 HCHG RX REV CODE 250 W/ 637 OVERRIDE(OP): Performed by: NURSE PRACTITIONER

## 2020-10-12 PROCEDURE — 700111 HCHG RX REV CODE 636 W/ 250 OVERRIDE (IP): Performed by: NURSE PRACTITIONER

## 2020-10-12 PROCEDURE — 700105 HCHG RX REV CODE 258

## 2020-10-12 PROCEDURE — 3E02340 INTRODUCTION OF INFLUENZA VACCINE INTO MUSCLE, PERCUTANEOUS APPROACH: ICD-10-PCS | Performed by: NURSE PRACTITIONER

## 2020-10-12 PROCEDURE — 302098 PASTE RING (FLAT): Performed by: NURSE PRACTITIONER

## 2020-10-12 PROCEDURE — 90662 IIV NO PRSV INCREASED AG IM: CPT | Performed by: NURSE PRACTITIONER

## 2020-10-12 PROCEDURE — 770021 HCHG ROOM/CARE - ISO PRIVATE

## 2020-10-12 PROCEDURE — A9270 NON-COVERED ITEM OR SERVICE: HCPCS | Performed by: NURSE PRACTITIONER

## 2020-10-12 PROCEDURE — 29581 APPL MULTLAYER CMPRN SYS LEG: CPT

## 2020-10-12 PROCEDURE — 97606 NEG PRS WND THER DME>50 SQCM: CPT

## 2020-10-12 PROCEDURE — 90471 IMMUNIZATION ADMIN: CPT

## 2020-10-12 RX ORDER — SODIUM CHLORIDE 9 MG/ML
INJECTION, SOLUTION INTRAVENOUS
Status: COMPLETED
Start: 2020-10-12 | End: 2020-10-12

## 2020-10-12 RX ADMIN — DIVALPROEX SODIUM 125 MG: 125 CAPSULE ORAL at 05:37

## 2020-10-12 RX ADMIN — GABAPENTIN 100 MG: 100 CAPSULE ORAL at 05:37

## 2020-10-12 RX ADMIN — OXYCODONE HYDROCHLORIDE 5 MG: 5 TABLET ORAL at 20:30

## 2020-10-12 RX ADMIN — OXYCODONE HYDROCHLORIDE 5 MG: 5 TABLET ORAL at 23:30

## 2020-10-12 RX ADMIN — DIVALPROEX SODIUM 125 MG: 125 CAPSULE ORAL at 20:30

## 2020-10-12 RX ADMIN — INFLUENZA A VIRUS A/MICHIGAN/45/2015 X-275 (H1N1) ANTIGEN (FORMALDEHYDE INACTIVATED), INFLUENZA A VIRUS A/SINGAPORE/INFIMH-16-0019/2016 IVR-186 (H3N2) ANTIGEN (FORMALDEHYDE INACTIVATED), INFLUENZA B VIRUS B/PHUKET/3073/2013 ANTIGEN (FORMALDEHYDE INACTIVATED), AND INFLUENZA B VIRUS B/MARYLAND/15/2016 BX-69A ANTIGEN (FORMALDEHYDE INACTIVATED) 0.7 ML: 60; 60; 60; 60 INJECTION, SUSPENSION INTRAMUSCULAR at 16:36

## 2020-10-12 RX ADMIN — DIVALPROEX SODIUM 125 MG: 125 CAPSULE ORAL at 13:36

## 2020-10-12 RX ADMIN — GABAPENTIN 100 MG: 100 CAPSULE ORAL at 16:35

## 2020-10-12 RX ADMIN — RISPERIDONE 1 MG: 1 TABLET ORAL at 16:34

## 2020-10-12 RX ADMIN — SODIUM CHLORIDE 1000 ML: 9 INJECTION, SOLUTION INTRAVENOUS at 23:33

## 2020-10-12 RX ADMIN — OXYCODONE HYDROCHLORIDE 5 MG: 5 TABLET ORAL at 16:34

## 2020-10-12 RX ADMIN — RISPERIDONE 0.5 MG: 0.5 TABLET ORAL at 05:37

## 2020-10-12 RX ADMIN — OXYCODONE HYDROCHLORIDE 5 MG: 5 TABLET ORAL at 05:36

## 2020-10-12 RX ADMIN — OXYCODONE HYDROCHLORIDE 5 MG: 5 TABLET ORAL at 00:55

## 2020-10-12 RX ADMIN — OXYCODONE HYDROCHLORIDE 5 MG: 5 TABLET ORAL at 13:37

## 2020-10-12 RX ADMIN — Medication 400 MG: at 16:34

## 2020-10-12 RX ADMIN — GABAPENTIN 100 MG: 100 CAPSULE ORAL at 13:36

## 2020-10-12 ASSESSMENT — PAIN DESCRIPTION - PAIN TYPE
TYPE: CHRONIC PAIN

## 2020-10-12 NOTE — WOUND TEAM
[facility]  Inpatient Services   Wound and Skin Care Progress Note    Admission Date: 8/7/2020     Last order of IP CONSULT TO WOUND CARE was found on 9/1/2020 from Hospital Encounter on 8/7/2020     HPI, PMH, SH: Reviewed    Unit where seen by Wound Team: [id number]     WOUND CONSULT/FOLLOW UP RELATED TO:  Left leg scheduled NPWT change     Self Report / Pain Level: Minimal c/o pain.    OBJECTIVE:  Vac dressing in place, 2 layer removed by RN for shower.    WOUND TYPE, LOCATION, CHARACTERISTICS (Pressure Injuries: location, stage, POA or date identified)        Negative Pressure Wound Therapy 09/03/20 Leg Lower;Medial;Posterior Left (Active)   NPWT Pump Mode / Pressure Setting Ulta;Continuous;125 mmHg 10/12/20 1400   Dressing Type Medium;Black Foam (Veraflo) 10/12/20 1400   Number of Foam Pieces Used 4 10/12/20 1400   Canister Changed No 10/12/20 1400   Output (mL) 300 mL 10/12/20 0600   NEXT Dressing Change/Treatment Date 10/14/20 10/12/20 1400   VAC VeraFlo Irrigant Normal Saline 10/12/20 1400   VAC VeraFlo Soak Time (mins) 6 10/12/20 1400   VAC VeraFlo Instill Volume (ml) 24 10/12/20 1400   VAC VeraFlo - Therapy Time (hrs) 1 10/12/20 1400   VAC VeraFlo Pressure (mm/Hg) Continuous;125 mmHg 10/12/20 1400   WOUND NURSE ONLY - Time Spent with Patient (mins) 800 10/10/20 1800           Wound 08/07/20 Full Thickness Wound Leg LLE posterior, extends medially and laterally (Active)   Wound Image      10/07/20 1100   Site Assessment Red;White;Fully granulated 10/12/20 1400   Periwound Assessment Blanchable erythema;Intact;Dry;Clean 10/12/20 1400   Margins Attached edges;Defined edges;Epibole (rolled edges) 10/12/20 1400   Closure Secondary intention 10/12/20 1400   Drainage Amount Small 10/12/20 1400   Drainage Description Serosanguineous 10/12/20 1400   Treatments Cleansed;CSWD - Conservative Sharp Wound Debridement;Irrigation;Offloading;Site care 10/12/20 1400   Wound Cleansing Approved Wound Cleanser  10/12/20 1400   Periwound Protectant Benzoin;Paste Ring;Drape 10/12/20 1400   Dressing Cleansing/Solutions Normal Saline 10/12/20 1400   Dressing Options Wound Vac;Compression Wrap Two Layer 10/12/20 1400   Dressing Changed Changed 10/12/20 1400   Dressing Status Clean;Dry;Intact 10/12/20 1400   Dressing Change/Treatment Frequency By Wound Team Only 10/12/20 1400   NEXT Dressing Change/Treatment Date 10/14/20 10/12/20 1400   NEXT Weekly Photo (Inpatient Only) 10/14/20 10/09/20 1300   Non-staged Wound Description Full thickness 10/12/20 1400   Wound Length (cm) 29 cm 10/07/20 1100   Wound Width (cm) 13.5 cm 10/07/20 1100   Wound Depth (cm) 0.2 cm 10/07/20 1100   Wound Surface Area (cm^2) 391.5 cm^2 10/07/20 1100   Wound Volume (cm^3) 78.3 cm^3 10/07/20 1100   Post-Procedure Length (cm) 29 cm 10/07/20 1100   Post-Procedure Width (cm) 14.5 cm 10/07/20 1100   Post-Procedure Depth (cm) 0.3 cm 10/07/20 1100   Post-Procedure Surface Area (cm^2) 420.5 cm^2 10/07/20 1100   Post-Procedure Volume (cm^3) 126.15 cm^3 10/07/20 1100   Wound Healing % -49 10/07/20 1100   Wound Bed Granulation (%) 30 % 10/07/20 1100   Wound Bed Epithelium (%) 0 % 09/01/20 1300   Wound Bed Slough (%) 70 % 10/07/20 1100   Wound Bed Granulation (%) - Post-Procedure 100 % 10/07/20 1100   Wound Bed Epithelium % - (Post-Procedure) 0 % 09/01/20 1300   Wound Bed Slough % - (Post-Procedure) 0 % 10/07/20 1100   Wound Bed Eschar % - (Post-Procedure) 0 % 09/01/20 1300   Tunneling (cm) 0 cm 09/03/20 1300   Undermining (cm) 0 cm 09/03/20 1300   Shape Irregular 10/12/20 1400   Wound Odor None 10/12/20 1400   Pulses Left;2+;DP;PT 10/12/20 1400   Exposed Structures None 10/12/20 1400   WOUND NURSE ONLY - Time Spent with Patient (mins) 60 10/12/20 1400            VASCULAR    KOSTAS:   KOSTAS Results, Last 30 Days Us-kostas Single Level Bilat    Result Date: 9/2/2020  Narrative  Vascular Laboratory  Conclusions  1.  Normal bilateral KOSTAS's.  GRUPO GANN  Age:    64    Gender:      M  MRN:    [id number]  :    1955      BSA:  Exam Date:     2020 09:59  Room #:     Inpatient  Priority:     Routine  Ht (in):             Wt (lb):  Ordering Physician:        EDDA CANADA  Referring Physician:       [id number]  Sonographer:               Deja Ellis RDMS                             RVT  Study Type:                Complete Bilateral  Technical Quality:         Adequate  Indications:     Ulceration of LE  CPT Codes:       40509  ICD Codes:       707.1  History:         Nonhealing wound of left lower extremity.  Limitations:                 RIGHT  Waveform            Systolic BPs (mmHg)                             117           Brachial  Triphasic                                Common Femoral  Triphasic                  138           Posterior Tibial  Triphasic                  132           Dorsalis Pedis                                           Peroneal                             1.18          KOSTAS                                           TBI                       LEFT  Waveform        Systolic BPs (mmHg)                             114           Brachial  Triphasic                                Common Femoral  Triphasic                  127           Posterior Tibial  Triphasic                  122           Dorsalis Pedis                                           Peroneal                             1.09          KOSTAS                                           TBI  Findings  Right.  Doppler waveforms of the common femoral artery are of high amplitude and  triphasic.  Doppler waveforms at the ankle are brisk and triphasic.  Ankle-brachial index is normal.  Left.  Doppler waveforms of the common femoral artery are of high amplitude and  triphasic.  Doppler waveforms at the ankle are brisk and triphasic.  Ankle-brachial index is normal.  Unable to obtained popliteal waveforms due to wound bandages.   Additional testing was not performed in accordance with lower extremity  arterial evaluation protocol.  Clover VINCENT Rickylashae  (Electronically Signed)  Final Date:      02 September 2020                   11:14    Lab Values:    Lab Results   Component Value Date/Time    WBC 5.5 09/30/2020 09:23 AM    RBC 3.74 (L) 09/30/2020 09:23 AM    HEMOGLOBIN 10.8 (L) 09/30/2020 09:23 AM    HEMATOCRIT 32.6 (L) 09/30/2020 09:23 AM    CREACTPROT 1.07 (H) 08/12/2020 01:55 PM    SEDRATEWES 85 (H) 08/07/2020 01:20 PM    HBA1C 5.7 (H) 11/09/2018 06:30 PM        Culture Results show:  Recent Results (from the past 720 hour(s))   CULTURE WOUND W/ GRAM STAIN    Collection Time: 09/01/20  2:00 PM    Specimen: Left Leg; Wound   Result Value Ref Range    Significant Indicator POS (POS)     Source WND     Site LEFT LEG     Culture Result - (A)     Gram Stain Result       Moderate Gram positive cocci.  Moderate Gram positive rods.      Culture Result (A)      Beta Hemolytic Streptococcus group A  Moderate growth      Culture Result (A)      Methicillin Resistant Staphylococcus aureus  Moderate growth      Culture Result (A)      Diphtheroids  Moderate growth  Mixed morphologies.         INTERVENTIONS BY WOUND TEAM:    2 layer compression wrap removed, vac dressing removed and wound and meredith wound cleansed with wound cleanser.  Meredith-wound prepped with benzoin and paste ring.  Black veraflo foam to wound bed, 4 veraflo black foam pieces, sealed with drape and TRAC pad applied to proximal wound bed at thigh. Sacral mepilex applied superior thigh and tubing applied to mepilex   Resumed Veraflo,veraflo settings 24mL for 6 minutes instillation hourly.  No leaks noted. 2 layer compression wrap applied to left LE.    Interdisciplinary consultation: Patient, Bedside RN     EVALUATION / RATIONALE FOR TREATMENT:    10/12: Inflammation noted to the proximal part of the wound bed.  Will continue to monitor, possible vac break on Wednesday to help meredith-skin  inflammation.   10/7: Excisional debridement performed by Asaf ELLER. Will need to continue selective debridement with NPWT changes, and weekly excisional debridement with APRN to flatten out the scar tissue.  IV abx therapy ended 10/5.   10/5: Lateral wound still with some slough, both wounds smaller per measurements. Medial wound with all granular tissue.  9/30: Patient to start abx therapy for 7 days course per Dr. Ellis.  Started dakin's instillation to veraflo  9/28: malodorous today, culture taken.    9/25: Odor noted, though unclear if from wound or pt, consider shower before next Vac change. Consider regular vac next change, wound granular and superficial.   9/23: Patient still awaiting guardianship for placement.   9/18: wound granulating nicely and responding well to current treatment.    9/16: Wound bed with granular tissue, edges are thick but appear better than previous assessments. . NPWT VF to encourage closure by secondary intention and manage drainage while encouraging oxygenation and granulation to the wound bed. 2 layer compression to assist in decrease of swelling and encourage blood flow to wounds. Wound team to follow up and change 3x/week.      Goals: Steady decrease in wound area and depth weekly.    NURSING PLAN OF CARE ORDERS (X):    Dressing changes: See Dressing Care orders: X  Skin care: See Skin Care orders:   Rectal tube care: See Rectal Tube Care orders:   Other orders:      WOUND TEAM PLAN OF CARE:   Dressing changes by wound team:                   Follow up 3 times weekly:                NPWT change 3 times weekly:   X  Follow up 1-2 times weekly:      Follow up Bi-Monthly:                   Follow up as needed:       Other (explain):     Anticipated discharge plans: pending guardianship.   LTACH:        SNF/Rehab: X - patient will need ongoing wound care for LLE upon discharge - 3x/week.                 Home Health Care:           Outpatient Wound Center:             Self Care:

## 2020-10-13 PROCEDURE — 700102 HCHG RX REV CODE 250 W/ 637 OVERRIDE(OP): Performed by: NURSE PRACTITIONER

## 2020-10-13 PROCEDURE — 770021 HCHG ROOM/CARE - ISO PRIVATE

## 2020-10-13 PROCEDURE — A9270 NON-COVERED ITEM OR SERVICE: HCPCS | Performed by: NURSE PRACTITIONER

## 2020-10-13 PROCEDURE — 700111 HCHG RX REV CODE 636 W/ 250 OVERRIDE (IP): Performed by: NURSE PRACTITIONER

## 2020-10-13 RX ADMIN — GABAPENTIN 100 MG: 100 CAPSULE ORAL at 18:17

## 2020-10-13 RX ADMIN — DIVALPROEX SODIUM 125 MG: 125 CAPSULE ORAL at 14:34

## 2020-10-13 RX ADMIN — DIVALPROEX SODIUM 125 MG: 125 CAPSULE ORAL at 04:12

## 2020-10-13 RX ADMIN — OXYCODONE HYDROCHLORIDE 5 MG: 5 TABLET ORAL at 14:34

## 2020-10-13 RX ADMIN — OXYCODONE HYDROCHLORIDE 5 MG: 5 TABLET ORAL at 21:38

## 2020-10-13 RX ADMIN — Medication 400 MG: at 04:12

## 2020-10-13 RX ADMIN — OXYCODONE HYDROCHLORIDE 5 MG: 5 TABLET ORAL at 18:19

## 2020-10-13 RX ADMIN — RISPERIDONE 1 MG: 1 TABLET ORAL at 18:17

## 2020-10-13 RX ADMIN — OXYCODONE HYDROCHLORIDE 5 MG: 5 TABLET ORAL at 03:39

## 2020-10-13 RX ADMIN — GABAPENTIN 100 MG: 100 CAPSULE ORAL at 14:34

## 2020-10-13 RX ADMIN — HALOPERIDOL LACTATE 5 MG: 5 INJECTION, SOLUTION INTRAMUSCULAR at 04:08

## 2020-10-13 RX ADMIN — GABAPENTIN 100 MG: 100 CAPSULE ORAL at 04:12

## 2020-10-13 RX ADMIN — AMLODIPINE BESYLATE 5 MG: 5 TABLET ORAL at 04:12

## 2020-10-13 RX ADMIN — Medication 400 MG: at 18:16

## 2020-10-13 RX ADMIN — RISPERIDONE 0.5 MG: 0.5 TABLET ORAL at 04:12

## 2020-10-13 RX ADMIN — DIVALPROEX SODIUM 125 MG: 125 CAPSULE ORAL at 21:38

## 2020-10-13 ASSESSMENT — PAIN DESCRIPTION - PAIN TYPE
TYPE: CHRONIC PAIN
TYPE: ACUTE PAIN
TYPE: CHRONIC PAIN
TYPE: CHRONIC PAIN
TYPE: ACUTE PAIN
TYPE: CHRONIC PAIN

## 2020-10-13 ASSESSMENT — FIBROSIS 4 INDEX: FIB4 SCORE: 0.64

## 2020-10-13 NOTE — PROGRESS NOTES
Patient has become increasingly agitated throughout the night. Patient yelling he can't sleep with the sound of the call light machine and began beating his head with closed fists. Able to deescalate patient enough to get patient to lay in bed. Haldol 5mg administered per MAR.

## 2020-10-13 NOTE — CARE PLAN
Problem: Safety  Goal: Will remain free from falls  Outcome: PROGRESSING AS EXPECTED   Treaded socks in place, bed in the lowest position, 1:1 bedside sitter, call light and belongings within reach, pt call for assistance appropriately   Problem: Discharge Barriers/Planning  Goal: Patient's continuum of care needs will be met  Outcome: PROGRESSING AS EXPECTED   Pending guardianship

## 2020-10-14 PROBLEM — B85.0 HEAD LICE: Status: RESOLVED | Noted: 2020-08-12 | Resolved: 2020-10-14

## 2020-10-14 LAB
BASOPHILS # BLD AUTO: 0.8 % (ref 0–1.8)
BASOPHILS # BLD: 0.05 K/UL (ref 0–0.12)
EOSINOPHIL # BLD AUTO: 0.23 K/UL (ref 0–0.51)
EOSINOPHIL NFR BLD: 3.8 % (ref 0–6.9)
ERYTHROCYTE [DISTWIDTH] IN BLOOD BY AUTOMATED COUNT: 47.5 FL (ref 35.9–50)
HCT VFR BLD AUTO: 33.9 % (ref 42–52)
HGB BLD-MCNC: 11 G/DL (ref 14–18)
IMM GRANULOCYTES # BLD AUTO: 0.04 K/UL (ref 0–0.11)
IMM GRANULOCYTES NFR BLD AUTO: 0.7 % (ref 0–0.9)
LYMPHOCYTES # BLD AUTO: 1.33 K/UL (ref 1–4.8)
LYMPHOCYTES NFR BLD: 22 % (ref 22–41)
MCH RBC QN AUTO: 28.4 PG (ref 27–33)
MCHC RBC AUTO-ENTMCNC: 32.4 G/DL (ref 33.7–35.3)
MCV RBC AUTO: 87.6 FL (ref 81.4–97.8)
MONOCYTES # BLD AUTO: 0.85 K/UL (ref 0–0.85)
MONOCYTES NFR BLD AUTO: 14.1 % (ref 0–13.4)
NEUTROPHILS # BLD AUTO: 3.54 K/UL (ref 1.82–7.42)
NEUTROPHILS NFR BLD: 58.6 % (ref 44–72)
NRBC # BLD AUTO: 0 K/UL
NRBC BLD-RTO: 0 /100 WBC
PLATELET # BLD AUTO: 445 K/UL (ref 164–446)
PMV BLD AUTO: 8.8 FL (ref 9–12.9)
RBC # BLD AUTO: 3.87 M/UL (ref 4.7–6.1)
WBC # BLD AUTO: 6 K/UL (ref 4.8–10.8)

## 2020-10-14 PROCEDURE — 700102 HCHG RX REV CODE 250 W/ 637 OVERRIDE(OP): Performed by: NURSE PRACTITIONER

## 2020-10-14 PROCEDURE — 302098 PASTE RING (FLAT): Performed by: NURSE PRACTITIONER

## 2020-10-14 PROCEDURE — 97606 NEG PRS WND THER DME>50 SQCM: CPT

## 2020-10-14 PROCEDURE — A9270 NON-COVERED ITEM OR SERVICE: HCPCS | Performed by: NURSE PRACTITIONER

## 2020-10-14 PROCEDURE — 85025 COMPLETE CBC W/AUTO DIFF WBC: CPT

## 2020-10-14 PROCEDURE — 99231 SBSQ HOSP IP/OBS SF/LOW 25: CPT | Performed by: NURSE PRACTITIONER

## 2020-10-14 PROCEDURE — 29581 APPL MULTLAYER CMPRN SYS LEG: CPT

## 2020-10-14 PROCEDURE — 11045 DBRDMT SUBQ TISS EACH ADDL: CPT | Performed by: NURSE PRACTITIONER

## 2020-10-14 PROCEDURE — 11042 DBRDMT SUBQ TIS 1ST 20SQCM/<: CPT | Performed by: NURSE PRACTITIONER

## 2020-10-14 PROCEDURE — 99232 SBSQ HOSP IP/OBS MODERATE 35: CPT | Performed by: INTERNAL MEDICINE

## 2020-10-14 PROCEDURE — 99231 SBSQ HOSP IP/OBS SF/LOW 25: CPT | Mod: 25 | Performed by: NURSE PRACTITIONER

## 2020-10-14 PROCEDURE — 770001 HCHG ROOM/CARE - MED/SURG/GYN PRIV*

## 2020-10-14 PROCEDURE — 36415 COLL VENOUS BLD VENIPUNCTURE: CPT

## 2020-10-14 RX ADMIN — RISPERIDONE 1 MG: 1 TABLET ORAL at 16:41

## 2020-10-14 RX ADMIN — Medication 400 MG: at 16:41

## 2020-10-14 RX ADMIN — OXYCODONE HYDROCHLORIDE 5 MG: 5 TABLET ORAL at 01:17

## 2020-10-14 RX ADMIN — RISPERIDONE 0.5 MG: 0.5 TABLET ORAL at 04:49

## 2020-10-14 RX ADMIN — Medication 400 MG: at 04:50

## 2020-10-14 RX ADMIN — OXYCODONE HYDROCHLORIDE 5 MG: 5 TABLET ORAL at 04:49

## 2020-10-14 RX ADMIN — OXYCODONE HYDROCHLORIDE 5 MG: 5 TABLET ORAL at 08:19

## 2020-10-14 RX ADMIN — GABAPENTIN 100 MG: 100 CAPSULE ORAL at 04:49

## 2020-10-14 RX ADMIN — DIVALPROEX SODIUM 125 MG: 125 CAPSULE ORAL at 21:31

## 2020-10-14 RX ADMIN — OXYCODONE HYDROCHLORIDE 5 MG: 5 TABLET ORAL at 12:26

## 2020-10-14 RX ADMIN — OXYCODONE HYDROCHLORIDE 5 MG: 5 TABLET ORAL at 16:41

## 2020-10-14 RX ADMIN — GABAPENTIN 100 MG: 100 CAPSULE ORAL at 16:41

## 2020-10-14 RX ADMIN — DIVALPROEX SODIUM 125 MG: 125 CAPSULE ORAL at 12:26

## 2020-10-14 RX ADMIN — GABAPENTIN 100 MG: 100 CAPSULE ORAL at 12:26

## 2020-10-14 RX ADMIN — DIVALPROEX SODIUM 125 MG: 125 CAPSULE ORAL at 04:48

## 2020-10-14 RX ADMIN — ACETAMINOPHEN 650 MG: 325 TABLET, FILM COATED ORAL at 21:31

## 2020-10-14 RX ADMIN — AMLODIPINE BESYLATE 5 MG: 5 TABLET ORAL at 04:48

## 2020-10-14 ASSESSMENT — ENCOUNTER SYMPTOMS
DIARRHEA: 0
FALLS: 0
PALPITATIONS: 0
DIZZINESS: 0
ABDOMINAL PAIN: 0
WEAKNESS: 1
COUGH: 1
CHILLS: 0
VOMITING: 0
MYALGIAS: 1
INSOMNIA: 0
FEVER: 0
BLURRED VISION: 0
SHORTNESS OF BREATH: 0
NAUSEA: 0
HEADACHES: 0
NERVOUS/ANXIOUS: 0
DOUBLE VISION: 0

## 2020-10-14 ASSESSMENT — PAIN DESCRIPTION - PAIN TYPE
TYPE: CHRONIC PAIN
TYPE: CHRONIC PAIN
TYPE: ACUTE PAIN
TYPE: CHRONIC PAIN
TYPE: CHRONIC PAIN
TYPE: ACUTE PAIN

## 2020-10-14 ASSESSMENT — PAIN SCALES - WONG BAKER: WONGBAKER_NUMERICALRESPONSE: DOESN'T HURT AT ALL

## 2020-10-14 NOTE — PROGRESS NOTES
Hospital Medicine Twice Weekly Progress Note    Date of Service  10/14/2020    Chief Complaint  LLE Wound    Hospital Course   Mr. Varela is a 64-year-old male with PMH significant for homelessness, etoh use, tobacco dependence and chronic LLE wound who presented 8/7/2020 with LLE wound infection.  He was hospitalized at our facility in April and evaluated by orthopedic surgery who recommended wound care.  Wound cultures at that time grew MSSA and strep. Patient reported losing his Medicaid card and having no transportation to wound clinic.  He was seen at [facility] earlier this week and prescribed ABX, however he has not filled the prescription.  US LLE negative for DVT.  Treated for sepsis with empiric ABX and wound care. He completed ABX course 9/16. He was found to have head lice and underwent treatments. Continued to have intermittent agitation so was started on risperdol, depakote and gabapentin per psych recs.  He has been deemed incapacitated to make medical decisions and is currently pending guardianship and placement.  He is medically stable and will be only be evaluated 2 times weekly unless there is a clinical change. Repeat wound culture 9/28 grew Strep A and proteus. ID was consulted and recommended 5-day IV ABX with Zosyn - completed ABX 10/5.       Interval Problem Update  10/14 - foul smelling drainage again noted from wound today. Also increased pain with debridement. Veriflow wound vac switched to standard wound vac. No leukocytosis/afebrile. Discussed again with ID today.    Consultants/Specialty  Infectious Disease  Psychiatry    Code Status  Full Code    Disposition  Pending guardianship and placement - likely Group Home     Review of Systems  Review of Systems   Constitutional: Positive for malaise/fatigue. Negative for chills and fever.   HENT: Negative.    Eyes: Negative for blurred vision and double vision.   Respiratory: Positive for cough (chronic). Negative for shortness of  breath.    Cardiovascular: Negative for chest pain and palpitations.   Gastrointestinal: Negative for nausea and vomiting.   Genitourinary: Negative for dysuria and urgency.   Musculoskeletal: Negative for falls and joint pain.   Skin:        LLE wound hurts more today   Neurological: Positive for weakness. Negative for dizziness and headaches.   Psychiatric/Behavioral: The patient is not nervous/anxious and does not have insomnia.    All other systems reviewed and are negative.       Physical Exam  Temp:  [36.7 °C (98 °F)-36.8 °C (98.2 °F)] 36.8 °C (98.2 °F)  Pulse:  [66-71] 71  Resp:  [17-18] 18  BP: (114-134)/(72-74) 114/72  SpO2:  [94 %-96 %] 94 %    Physical Exam  Vitals signs and nursing note reviewed. Exam conducted with a chaperone present.   Constitutional:       General: He is awake. He is not in acute distress.     Appearance: He is underweight. He is ill-appearing. He is not toxic-appearing or diaphoretic.   HENT:      Head: Normocephalic and atraumatic.      Nose: Nose normal.      Mouth/Throat:      Lips: Pink.      Mouth: Mucous membranes are moist.   Eyes:      General: No scleral icterus.     Conjunctiva/sclera: Conjunctivae normal.   Neck:      Musculoskeletal: Normal range of motion.   Cardiovascular:      Rate and Rhythm: Normal rate.   Pulmonary:      Effort: Pulmonary effort is normal.   Abdominal:      General: There is no distension.      Tenderness: There is no abdominal tenderness.   Musculoskeletal:      Right lower leg: No edema.      Left lower leg: No edema.      Comments: Ambulates independently with steady gait.   Skin:     General: Skin is warm and dry.      Comments: Left leg wound - foul smelling with bloody drainage.  Wound vac in place, functioning appropriately    Neurological:      Mental Status: He is alert.      Coordination: Coordination is intact.      Gait: Gait is intact. Gait normal.      Comments: Oriented to himself and hospital   Psychiatric:         Mood and Affect:  Mood normal. Affect is labile.         Behavior: Behavior is not aggressive or combative. Behavior is cooperative.         Cognition and Memory: Cognition is impaired.         Judgment: Judgment is impulsive.      Comments:            Fluids    Intake/Output Summary (Last 24 hours) at 10/14/2020 1242  Last data filed at 10/14/2020 0200  Gross per 24 hour   Intake --   Output 900 ml   Net -900 ml       Laboratory  Recent Labs     10/14/20  1143   WBC 6.0   RBC 3.87*   HEMOGLOBIN 11.0*   HEMATOCRIT 33.9*   MCV 87.6   MCH 28.4   MCHC 32.4*   RDW 47.5   PLATELETCT 445   MPV 8.8*                       Imaging  IR-MIDLINE CATHETER INSERTION WO GUIDANCE > AGE 3   Final Result                  Ultrasound-guided midline placement performed by qualified nursing staff    as above.          US-KOSTAS SINGLE LEVEL BILAT   Final Result      CT-HEAD W/O   Final Result      No acute intracranial abnormality      DX-TIBIA AND FIBULA LEFT   Final Result      1.  Healed distal metaphyseal fractures of the left fibula and tibia with tibial IM layla in place.      2.  No acute fracture or dislocation.      3.  No radiopaque soft tissue foreign body.      DX-FEMUR-2+ LEFT   Final Result      1.  No radiographic evidence of acute traumatic injury left femur.      2.  Unchanged cortical thickening in the posterior aspect of the distal femoral diaphysis which may represent sequela of prior injury or infection.      3.  No soft tissue foreign body identified.      US-EXTREMITY VENOUS LOWER UNILAT LEFT   Final Result      DX-CHEST-PORTABLE (1 VIEW)   Final Result      No acute cardiopulmonary abnormality.           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   -blood cultures (-)   -afebrile. No Leukocytosis   -LPS following for serial debridements  -Continue wound vac  -Cx 9/1 Strep, MRSA,  diptheroids: completed Zyvox 9/2 thru 9/16  -cx 9/28 strep A and proteus - ID consulted and recommended course of IV zosyn which completed on 10/5.  -10/14 - foul smelling drainage again noted from wound today. Veriflow wound vac switched to standard wound vac. No leukocytosis/afebrile. Discussed again with ID today.    Encephalopathy- (present on admission)  Assessment & Plan  -acute on chronic, likely due Wernicke's   -Psychiatry: incapacitated to make medical decision or leave AMA   -Requires guardianship and placement  -Avoid narcotics and hypnotics.  Cautious use of benzodiazepines for alcohol withdrawal protocol  -Frequent reorientation  -Sleep hygiene    Non-compliance  Assessment & Plan  -likely also component of psychiatric disorder and ETOH abuse  -Psychiatric consulted and suggests patient is incapacitated to make medical decisions or leave AMA  -ongoing encouragement for compliance.    Psychiatric disorder  Assessment & Plan  -unknown/unspecified  -psychiatry consulted and deemed him incapacitated to leave AMA or make medical decisions  -continue risperdol 0.5 -1mg BID, cont depakote 125mg TID added neurotin 100mg TID 8/16  -Pending guardianship. Application submitted 9/3.    Flexion contracture of knee, 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)  Assessment & Plan  -secondary to chronic wound in that area  -PT OT  -encourage mobility    Emphysema/COPD (HCC)  Assessment & Plan  -chronic and stable without acute exacerbation    Alcohol dependence (HCC)- (present on admission)  Assessment & Plan  -history of   -abstinent since admission    Protein malnutrition (HCC)  Assessment & Plan  -history of ETOH and homelessness  -Body mass index is 20.33 kg/m².  -TID supplements  -encourage PO intake    Tobacco dependence- (present on admission)  Assessment & Plan  -abstinent since admission       VTE prophylaxis: Ambulatory    I have performed a physical exam and reviewed and updated ROS and Assessment/Plan today (10/14/20). In  review of the previous note there are no changes except as documented above    I certify the patient requires continued medically necessary hospital services for the treatment of non-healing wound.  The patient will remain in the hospital for the foreseeable future.  Discharge may or may not occur in the next 20 days due to ongoing discharge delays due to having no medically acceptable discharge options.    Please note that this dictation was created using voice recognition software. I have made every reasonable attempt to correct obvious errors, but there may be errors of grammar and possibly content that I did not discover before finalizing the note.    LOUISE Khan.

## 2020-10-14 NOTE — PROGRESS NOTES
Infectious Disease Progress Note    Author: Rohit Oscar M.D. Date & Time of service: 10/14/2020  4:48 PM    Chief Complaint:  Follow-up for chronic wounds    Interval History:  10/14 ID reconsulted due to new recurrent malodorous drainage from wound concerning for infection.  Patient remains afebrile, white count 6000    Labs Reviewed and Medications Reviewed.    Review of Systems:  Review of Systems   Constitutional: Negative for chills and fever.   Gastrointestinal: Negative for abdominal pain, diarrhea, nausea and vomiting.   Musculoskeletal: Positive for myalgias.   Skin: Negative for itching and rash.   All other systems reviewed and are negative.      Hemodynamics:  Temp (24hrs), Av.7 °C (98 °F), Min:36.6 °C (97.9 °F), Max:36.8 °C (98.2 °F)  Temperature: 36.6 °C (97.9 °F)  Pulse  Av.8  Min: 55  Max: 110   Blood Pressure : 119/70       Physical Exam:  Physical Exam  Vitals signs and nursing note reviewed.   Constitutional:       Appearance: He is not ill-appearing.      Comments: Thin   HENT:      Mouth/Throat:      Pharynx: No oropharyngeal exudate.      Comments: Poor dentition  Eyes:      General: No scleral icterus.        Right eye: No discharge.         Left eye: No discharge.      Conjunctiva/sclera: Conjunctivae normal.   Cardiovascular:      Rate and Rhythm: Normal rate.      Heart sounds: No murmur.   Pulmonary:      Effort: No respiratory distress.      Breath sounds: No wheezing.   Abdominal:      General: There is no distension.      Tenderness: There is no abdominal tenderness.   Musculoskeletal:      Comments: Lower extremities wrapped.  Wound care pictures reviewed, good granulation tissue base, new intent surrounding erythema, worse compared to prior images.  Malodorous discharge described by wound care   Skin:     Comments: As above.   Neurological:      General: No focal deficit present.      Mental Status: He is alert.      Comments: Moving all extremities   Psychiatric:       Comments: Currently deemed incapacitated to leave hospital AMA per documentation         Meds:    Current Facility-Administered Medications:   •  meropenem  •  Notify provider if pain remains uncontrolled **AND** Use the numeric rating scale (NRS-11) on regular floors and Critical-Care Pain Observation Tool (CPOT) on ICUs/Trauma to assess pain **AND** Pulse Ox (Oximetry) **AND** [DISCONTINUED] Pharmacy Consult Request **AND** If patient difficult to arouse and/or has respiratory depression, stop any opiates that are currently infusing and call a Rapid Response. **AND** oxyCODONE immediate-release **AND** oxyCODONE immediate-release **AND** [DISCONTINUED] HYDROmorphone  •  senna-docusate **AND** polyethylene glycol/lytes **AND** [DISCONTINUED] magnesium hydroxide **AND** [DISCONTINUED] bisacodyl  •  risperiDONE  •  risperiDONE  •  haloperidol lactate  •  gabapentin  •  divalproex  •  magnesium oxide  •  amLODIPine  •  acetaminophen    Labs:  Recent Labs     10/14/20  1143   WBC 6.0   RBC 3.87*   HEMOGLOBIN 11.0*   HEMATOCRIT 33.9*   MCV 87.6   MCH 28.4   RDW 47.5   PLATELETCT 445   MPV 8.8*   NEUTSPOLYS 58.60   LYMPHOCYTES 22.00   MONOCYTES 14.10*   EOSINOPHILS 3.80   BASOPHILS 0.80     No results for input(s): SODIUM, POTASSIUM, CHLORIDE, CO2, GLUCOSE, BUN, CPKTOTAL in the last 72 hours.  No results for input(s): ALBUMIN, TBILIRUBIN, ALKPHOSPHAT, TOTPROTEIN, ALTSGPT, ASTSGOT, CREATININE in the last 72 hours.    Imaging:  Ir-midline Catheter Insertion Wo Guidance > Age 3    Result Date: 10/1/2020  HISTORY/REASON FOR EXAM:  Midline Placement   TECHNIQUE/EXAM DESCRIPTION AND NUMBER OF VIEWS: Midline insertion with ultrasound guidance.  FINDINGS: Midline insertion with Ultrasound Guidance was performed by qualified nursing staff without the assistance of a Radiologist. Midline positioning as measured by RN or as appropriate length of catheter selected.              Ultrasound-guided midline placement performed by  qualified nursing staff as above.       Micro:  Results     ** No results found for the last 168 hours. **          Assessment:  65-year-old male admitted 8/7/2020, known history of cognitive impairment, alcoholism, tobacco abuse, homelessness.  Patient has been dealing with a chronic wound on medial left leg initially sustained in May 2018 when he had a fall.  The wound worsened and he initially underwent debridement for necrotizing fasciitis at [facility] in May 2018.  He was admitted over a month prior to this admission due to concern for left lower extremity wound infection.  Wound cultures early this admission 8/7/2020 grew MSSA and group A strep.  He was also infested with lice.  Repeat wound cultures from 9/1 grew diphtheroids as well.  Patient received Zyvox from 9/2 - 9/16.  Wound care was continued and patient was initially doing well, but ID was reconsulted on 9/30/2020 due to concern for recurrent infection with increased slough, drainage, odor.  Wound culture from 9/28 grew group A strep and a multidrug resistant Proteus vulgaris.  Patient received a 5-day course of IV Zosyn with improvement.    ID reconsulted 10/14/2020 again due to recurrence of infection based on exam with malodorous drainage and surrounding erythema.    Plan:  -Last cultures with group A strep and a multidrug resistant Proteus vulgaris.  The organism demonstrates signs of ampC induced cephalosporinase production  -Start IV meropenem 500 mg every 6 hours x 7- day course  -Continue wound care and wound VAC therapy per LPS    Discussed with LPS, Asaf Rendon    ID will follow.  Please call with questions.

## 2020-10-14 NOTE — PROGRESS NOTES
Report received. Assumed care. Pt in bed. Sitter bedside. A/O x4. VSS. Responds appropriately. Denies chest pain and SOB. Assessment complete. Discussed POC, , pt verbalizes understanding. Explained importance of calling before getting OOB. Call light and belongings within reach. Bed alarm refused. Bed in the lowest position. Treaded socks in place. Hourly rounding in progress.

## 2020-10-14 NOTE — PROGRESS NOTES
"WOUND CARE PROVIDER PROGRESS NOTE            HPI: 65-year-old male homelessness, EtOH use, tobacco dependence, chronic left lower extremity wound admitted 8/7/2020 with left lower extremity wound infection.  Patient was recently hospitalized at [facility] in April 2020, evaluated by orthopedic surgery who recommended wound care.  Wound culture grew MSSA and strep.  Patient was recently seen at [facility] prior to this admission was given a prescription for antibiotics but did not fill this.  Patient reports that he has had this wound for 8 to 10 years.  He reports that he fell and went \"ass over tea kettle\".  He reports that he would clean the wound almost daily with soap and water at the homeless shelter.  Per chart review, patient reported he developed the ulcer in May 2018 when he fell while hiking.  Patient was seen at wound care clinic in August 2018.  Patient had a  assisting him while he was living at [facility] care housing.  Wound VAC /was ordered however  had concerns with patient's compliancy and/ access to medical care.  Skilled nursing facility was contacted to have patient be admitted, however he was not accepted due to Medicaid.    Patient is on Lovenox 40 mg daily.  Refused today however he took it yesterday.  Allergies reviewed.  He denies any allergies.      Interval hx:   9/11/2020: pt calm cooperative. On zyvox. Seen during VAC and two layer compression wrap change. Pt tolerating VAC and wraps. Wound decreased in size.   9/18/2020: Patient denies any fevers, chills, nausea, vomiting.  He is tolerating the veraflo wound VAC and 2 layer compression wrap.  Wound is decreasing in size.  Increased granular tissue noted, wound edges have improved however he does have rolled edges to popliteal fossa area.  Patient calm and cooperative, watching sports.  9/30/2020: Denies fevers, chills, nausea, vomiting.  Tolerating wound VAC and 2 layer compression wrap.  Refused nail care.  Wound " culture positive for strep A and Proteus.  10/7/2020: completed 5 day course of zyvox. Denies fevers chills nausea vomiting.  Seen during veraflo VAC and 2 layer compression wrap change.    10/14/2020: Patient denies fevers, chills, nausea, vomiting.  Patient wound is malodorous complaining of increased pain to the wound.  Increased slough noted to wound bed despite veraflo wound VAC.  Reconsult ID.          Results:  Wound culture: 9/28/2020: Positive for beta-hemolytic strep group A, Proteus.  Sensitivities pending    Wound cx: 9/1/2020 mrsa, diphtheroids, beta hemolytic strep group A    8/7/2020: X-ray tib-fib left:  1.  Healed distal metaphyseal fractures of the left fibula and tibia with tibial IM layla in place.     2.  No acute fracture or dislocation.     3.  No radiopaque soft tissue foreign body.      Arterial studies:   9/2/2020  Right.    Doppler waveforms of the common femoral artery are of high amplitude and    triphasic.    Doppler waveforms at the ankle are brisk and triphasic.    Ankle-brachial index is normal.       Left.    Doppler waveforms of the common femoral artery are of high amplitude and    triphasic.    Doppler waveforms at the ankle are brisk and triphasic.    Ankle-brachial index is normal.    Unable to obtained popliteal waveforms due to wound bandages.        Exam:  Left lower leg full-thickness ulcer  Malodorous with thickened slough and tenderness with wound bed.  Erythema to distal aspect of wound  Wound tissue is dark red, less granular   Thick closed wound edges  Palpable PT pulse to left foot +1 foot   +2 DP left foot  Difficulty palpating popliteal due to scar tissue  No erythema  Able to extend left leg to 160 degrees. Able to flex left leg around 80 degrees  Increased pain with palpation      Pre-debridement left leg medial        Pre-debridement left lateral lower leg                    Pre-debridement measurements: 30 x 15 x 0.2 cm    DESCRIPTION OF PROCEDURE:  Excision of  skin, subcutaneous tissue including chronic rolled wound edges to left lower leg ulcer     PMH, medications and recent labs reviewed        PROCEDURE: A formal timeout was performed to confirm patient's correct name, correct site, correct procedure and correct laterality.    Topical viscous lidocaine applied topically and allowed to dwell for approximately 5 minutes prior to start of procedure.  -Curette  used to excise closed wound edges, slough and scar tissue from the wound bed.  Total area debrided, 72 cm².  Debridement carried into the subcutaneous tissue layer.   -Bleeding controlled with manual pressure.    Wound care completed by wound RN- refer to flowsheet and note  -Debridement limited by patient's pain tolerance primarily to proximal aspect of wound.       Post debridement measurements: 30 x 15.1 x 0.3 cm     Post debridement left lower leg posterior post debride        Left leg posterior lateral post debride          left leg posterior medial post debridement                              ASSESSMENT/PLAN:  65-year-old male with homelessness, EtOH use, tobacco use, and chronic left leg ulcer.  Wound has increased in size, tender to touch, erythema to periwound, malodorous.  Additionally tissue quality has worsened.  Patient has completed 5-day course of antibiotics.  Reviewed previous photos, wound significantly improved while on antibiotics.  Discontinue veraflo wound VAC at this time, add silver powder for antimicrobial therapy topically.      -Switch veraflo VAC to regular wound VAC with silver powder.  Continue 2 layer compression wrap.   -Does not need veraflo VAC at discharge.   - signs and symptoms of infection present today.  Completed antibiotic course on 10/5/2020.  Reconsult ID.  -Check CBC with differential, ordered  - Patient to continue to be seen by wound care team while admitted  Patient to continue to be followed by wound care nurse practitioner as he requires serial excisional  debridements        Discharge plan:  Pending guardianship      D/W: Patient, RN, Sera wound care RN, RAFITA Owen with hospitalist, Dr. Oscar

## 2020-10-14 NOTE — WOUND TEAM
[facility]  Inpatient Services   Wound and Skin Care Progress Note    Admission Date: 8/7/2020     Last order of IP CONSULT TO WOUND CARE was found on 9/1/2020 from Hospital Encounter on 8/7/2020     HPI, PMH, SH: Reviewed    Unit where seen by Wound Team: [id number]     WOUND CONSULT/FOLLOW UP RELATED TO:  Left leg scheduled NPWT change     Self Report / Pain Level: Minimal c/o pain.    OBJECTIVE:  Vac dressing in place, 2 layer removed by RN for shower.    WOUND TYPE, LOCATION, CHARACTERISTICS (Pressure Injuries: location, stage, POA or date identified)      Negative Pressure Wound Therapy 09/03/20 Leg Lower;Medial;Posterior Left (Active)   NPWT Pump Mode / Pressure Setting Ulta;Continuous;125 mmHg 10/14/20 1100   Dressing Type Medium;Black Foam (Regular) 10/14/20 1100   Number of Foam Pieces Used 4 10/14/20 1100   Canister Changed No 10/14/20 1100   Output (mL) 900 mL 10/14/20 0200   NEXT Dressing Change/Treatment Date 10/16/20 10/14/20 1100   VAC VeraFlo Irrigant Other (Comments) 10/14/20 1100   VAC VeraFlo Soak Time (mins) 0 10/14/20 1100   VAC VeraFlo Instill Volume (ml) 0 10/14/20 1100   VAC VeraFlo - Therapy Time (hrs) 0 10/14/20 1100   VAC VeraFlo Pressure (mm/Hg) Continuous;125 mmHg 10/12/20 1400   WOUND NURSE ONLY - Time Spent with Patient (mins) 90 10/14/20 1100           Wound 08/07/20 Full Thickness Wound Leg LLE posterior, extends medially and laterally (Active)   Wound Image      10/14/20 1100   Site Assessment Red;Tan 10/14/20 1100   Periwound Assessment Clean;Dry;Intact 10/14/20 1100   Margins Attached edges;Defined edges 10/14/20 1100   Closure Secondary intention 10/14/20 1100   Drainage Amount Moderate 10/14/20 1100   Drainage Description Serosanguineous 10/14/20 1100   Treatments Cleansed;Irrigation 10/14/20 1100   Wound Cleansing Approved Wound Cleanser 10/14/20 1100   Periwound Protectant Skin Protectant Wipes to Periwound;Drape 10/14/20 1100   Dressing Cleansing/Solutions Not  Applicable 10/14/20 1100   Dressing Options Wound Vac;Compression Wrap Two Layer 10/14/20 1100   Dressing Changed Changed 10/14/20 1100   Dressing Status Clean;Dry;Intact 10/14/20 1100   Dressing Change/Treatment Frequency By Wound Team Only 10/14/20 1100   NEXT Dressing Change/Treatment Date 10/16/20 10/14/20 1100   NEXT Weekly Photo (Inpatient Only) 10/21/20 10/14/20 1100   Non-staged Wound Description Full thickness 10/14/20 1100   Wound Length (cm) 30 cm 10/14/20 1100   Wound Width (cm) 15 cm 10/14/20 1100   Wound Depth (cm) 0.3 cm 10/14/20 1100   Wound Surface Area (cm^2) 450 cm^2 10/14/20 1100   Wound Volume (cm^3) 135 cm^3 10/14/20 1100   Post-Procedure Length (cm) 30 cm 10/14/20 1100   Post-Procedure Width (cm) 15 cm 10/14/20 1100   Post-Procedure Depth (cm) 0.3 cm 10/14/20 1100   Post-Procedure Surface Area (cm^2) 450 cm^2 10/14/20 1100   Post-Procedure Volume (cm^3) 135 cm^3 10/14/20 1100   Wound Healing % -156 10/14/20 1100   Wound Bed Granulation (%) 20 % 10/14/20 1100   Wound Bed Epithelium (%) 0 % 20 1300   Wound Bed Slough (%) 80 % 10/14/20 1100   Wound Bed Granulation (%) - Post-Procedure 100 % 10/14/20 1100   Wound Bed Epithelium % - (Post-Procedure) 0 % 20   Wound Bed Slough % - (Post-Procedure) 0 % 10/07/20 1100   Wound Bed Eschar % - (Post-Procedure) 0 % 20 1300   Tunneling (cm) 0 cm 20 1300   Undermining (cm) 0 cm 20 1300   Shape Irregular 10/14/20 1100   Wound Odor None 10/14/20 1100   Pulses Left;2+;DP;PT 10/14/20 1100   Exposed Structures None 10/14/20 1100   WOUND NURSE ONLY - Time Spent with Patient (mins) 90 10/14/20 1100         VASCULAR    KOSTAS:   KOSTAS Results, Last 30 Days Us-kostas Single Level Bilat    Result Date: 2020  Narrative  Vascular Laboratory  Conclusions  1.  Normal bilateral KOSTAS's.  GRUPO GANN  Age:    64    Gender:     M  MRN:    [id number]  :    1955      BSA:  Exam Date:     2020 09:59  Room #:     Inpatient  Priority:      Routine  Ht (in):             Wt (lb):  Ordering Physician:        EDDA CANADA  Referring Physician:       [id number]  Sonographer:               Deja Ellis RDMS                             RVT  Study Type:                Complete Bilateral  Technical Quality:         Adequate  Indications:     Ulceration of LE  CPT Codes:       25920  ICD Codes:       707.1  History:         Nonhealing wound of left lower extremity.  Limitations:                 RIGHT  Waveform            Systolic BPs (mmHg)                             117           Brachial  Triphasic                                Common Femoral  Triphasic                  138           Posterior Tibial  Triphasic                  132           Dorsalis Pedis                                           Peroneal                             1.18          KOSTAS                                           TBI                       LEFT  Waveform        Systolic BPs (mmHg)                             114           Brachial  Triphasic                                Common Femoral  Triphasic                  127           Posterior Tibial  Triphasic                  122           Dorsalis Pedis                                           Peroneal                             1.09          KOSTAS                                           TBI  Findings  Right.  Doppler waveforms of the common femoral artery are of high amplitude and  triphasic.  Doppler waveforms at the ankle are brisk and triphasic.  Ankle-brachial index is normal.  Left.  Doppler waveforms of the common femoral artery are of high amplitude and  triphasic.  Doppler waveforms at the ankle are brisk and triphasic.  Ankle-brachial index is normal.  Unable to obtained popliteal waveforms due to wound bandages.  Additional testing was not performed in accordance with lower extremity  arterial evaluation protocol.  Clover Maya   (Electronically Signed)  Final Date:      02 September 2020                   11:14    Lab Values:    Lab Results   Component Value Date/Time    WBC 5.5 09/30/2020 09:23 AM    RBC 3.74 (L) 09/30/2020 09:23 AM    HEMOGLOBIN 10.8 (L) 09/30/2020 09:23 AM    HEMATOCRIT 32.6 (L) 09/30/2020 09:23 AM    CREACTPROT 1.07 (H) 08/12/2020 01:55 PM    SEDRATEWES 85 (H) 08/07/2020 01:20 PM    HBA1C 5.7 (H) 11/09/2018 06:30 PM        Culture Results show:  Recent Results (from the past 720 hour(s))   CULTURE WOUND W/ GRAM STAIN    Collection Time: 09/01/20  2:00 PM    Specimen: Left Leg; Wound   Result Value Ref Range    Significant Indicator POS (POS)     Source WND     Site LEFT LEG     Culture Result - (A)     Gram Stain Result       Moderate Gram positive cocci.  Moderate Gram positive rods.      Culture Result (A)      Beta Hemolytic Streptococcus group A  Moderate growth      Culture Result (A)      Methicillin Resistant Staphylococcus aureus  Moderate growth      Culture Result (A)      Diphtheroids  Moderate growth  Mixed morphologies.         INTERVENTIONS BY WOUND TEAM:    Patient seen with Asaf ELLER.  2 layer compression wrap removed, vac dressing removed and wound and addison wound cleansed with wound cleanser.  Excisional debridement by Asaf ELLER at bedside, see her note for details.  CSWD with currette to remove non-viable slough and bioburden from wound bed > 20cm2.   Addison-wound prepped with no sting skin prep and drape. Argales powder to wound bed, black foam to wound bed, 4 foam pieces, sealed with drape and TRAC pad applied to proximal wound bed at thigh. Sacral mepilex applied superior thigh and tubing applied to mepilex   Resumed NPWT at 125mmHg,no leaks noted. 2 layer compression wrap applied to left LE.    Interdisciplinary consultation: Patient, Bedside RN (Dipika), Asaf ELLER    EVALUATION / RATIONALE FOR TREATMENT:    10/14: patient seen with Asaf ELLER, stopping veraflo  instillation.  Starting silver powder and regular wound VAC to see if it will help with bioburden.  Possible colonization of bacteria.  ID involved.   10/12: Inflammation noted to the proximal part of the wound bed.  Will continue to monitor, possible vac break on Wednesday to help addison-skin inflammation.   10/7: Excisional debridement performed by Asaf ELLER. Will need to continue selective debridement with NPWT changes, and weekly excisional debridement with APRN to flatten out the scar tissue.  IV abx therapy ended 10/5.   10/5: Lateral wound still with some slough, both wounds smaller per measurements. Medial wound with all granular tissue.  9/30: Patient to start abx therapy for 7 days course per Dr. Ellis.  Started dakin's instillation to veraflo  9/28: malodorous today, culture taken.    9/25: Odor noted, though unclear if from wound or pt, consider shower before next Vac change. Consider regular vac next change, wound granular and superficial.   9/23: Patient still awaiting guardianship for placement.   9/18: wound granulating nicely and responding well to current treatment.    9/16: Wound bed with granular tissue, edges are thick but appear better than previous assessments. . NPWT VF to encourage closure by secondary intention and manage drainage while encouraging oxygenation and granulation to the wound bed. 2 layer compression to assist in decrease of swelling and encourage blood flow to wounds. Wound team to follow up and change 3x/week.      Goals: Steady decrease in wound area and depth weekly.    NURSING PLAN OF CARE ORDERS (X):    Dressing changes: See Dressing Care orders: X  Skin care: See Skin Care orders:   Rectal tube care: See Rectal Tube Care orders:   Other orders:      WOUND TEAM PLAN OF CARE:   Dressing changes by wound team:                   Follow up 3 times weekly:                NPWT change 3 times weekly:   X  Follow up 1-2 times weekly:      Follow up Bi-Monthly:                    Follow up as needed:       Other (explain):     Anticipated discharge plans: pending guardianship.   LTACH:        SNF/Rehab: X - patient will need ongoing wound care for LLE upon discharge - 3x/week.                 Home Health Care:           Outpatient Wound Center:            Self Care:

## 2020-10-15 ENCOUNTER — APPOINTMENT (OUTPATIENT)
Dept: RADIOLOGY | Facility: MEDICAL CENTER | Age: 65
DRG: 853 | End: 2020-10-15
Attending: NURSE PRACTITIONER
Payer: MEDICARE

## 2020-10-15 PROCEDURE — 700102 HCHG RX REV CODE 250 W/ 637 OVERRIDE(OP): Performed by: NURSE PRACTITIONER

## 2020-10-15 PROCEDURE — 700111 HCHG RX REV CODE 636 W/ 250 OVERRIDE (IP): Performed by: INTERNAL MEDICINE

## 2020-10-15 PROCEDURE — 700105 HCHG RX REV CODE 258: Performed by: INTERNAL MEDICINE

## 2020-10-15 PROCEDURE — 770001 HCHG ROOM/CARE - MED/SURG/GYN PRIV*

## 2020-10-15 PROCEDURE — A9270 NON-COVERED ITEM OR SERVICE: HCPCS | Performed by: NURSE PRACTITIONER

## 2020-10-15 PROCEDURE — 700111 HCHG RX REV CODE 636 W/ 250 OVERRIDE (IP): Performed by: NURSE PRACTITIONER

## 2020-10-15 PROCEDURE — 05HB33Z INSERTION OF INFUSION DEVICE INTO RIGHT BASILIC VEIN, PERCUTANEOUS APPROACH: ICD-10-PCS | Performed by: INTERNAL MEDICINE

## 2020-10-15 PROCEDURE — 700105 HCHG RX REV CODE 258

## 2020-10-15 PROCEDURE — 76937 US GUIDE VASCULAR ACCESS: CPT

## 2020-10-15 PROCEDURE — 700105 HCHG RX REV CODE 258: Performed by: NURSE PRACTITIONER

## 2020-10-15 RX ORDER — HYDROXYZINE HYDROCHLORIDE 25 MG/1
25 TABLET, FILM COATED ORAL 3 TIMES DAILY PRN
Status: DISCONTINUED | OUTPATIENT
Start: 2020-10-15 | End: 2021-05-03 | Stop reason: HOSPADM

## 2020-10-15 RX ORDER — SODIUM CHLORIDE 9 MG/ML
INJECTION, SOLUTION INTRAVENOUS
Status: COMPLETED
Start: 2020-10-15 | End: 2020-10-15

## 2020-10-15 RX ADMIN — RISPERIDONE 1 MG: 1 TABLET ORAL at 17:07

## 2020-10-15 RX ADMIN — RISPERIDONE 0.5 MG: 0.5 TABLET ORAL at 04:59

## 2020-10-15 RX ADMIN — Medication 400 MG: at 04:59

## 2020-10-15 RX ADMIN — DIVALPROEX SODIUM 125 MG: 125 CAPSULE ORAL at 12:36

## 2020-10-15 RX ADMIN — MEROPENEM 500 MG: 500 INJECTION, POWDER, FOR SOLUTION INTRAVENOUS at 18:40

## 2020-10-15 RX ADMIN — ACETAMINOPHEN 650 MG: 325 TABLET, FILM COATED ORAL at 04:59

## 2020-10-15 RX ADMIN — MEROPENEM 500 MG: 500 INJECTION, POWDER, FOR SOLUTION INTRAVENOUS at 13:18

## 2020-10-15 RX ADMIN — OXYCODONE HYDROCHLORIDE 5 MG: 5 TABLET ORAL at 21:20

## 2020-10-15 RX ADMIN — OXYCODONE HYDROCHLORIDE 5 MG: 5 TABLET ORAL at 06:47

## 2020-10-15 RX ADMIN — HYDROXYZINE HYDROCHLORIDE 25 MG: 50 TABLET, FILM COATED ORAL at 21:20

## 2020-10-15 RX ADMIN — OXYCODONE HYDROCHLORIDE 5 MG: 5 TABLET ORAL at 09:47

## 2020-10-15 RX ADMIN — GABAPENTIN 100 MG: 100 CAPSULE ORAL at 17:07

## 2020-10-15 RX ADMIN — SODIUM CHLORIDE 500 ML: 9 INJECTION, SOLUTION INTRAVENOUS at 13:17

## 2020-10-15 RX ADMIN — Medication 400 MG: at 17:07

## 2020-10-15 RX ADMIN — OXYCODONE HYDROCHLORIDE 5 MG: 5 TABLET ORAL at 12:36

## 2020-10-15 RX ADMIN — DIVALPROEX SODIUM 125 MG: 125 CAPSULE ORAL at 21:20

## 2020-10-15 RX ADMIN — GABAPENTIN 100 MG: 100 CAPSULE ORAL at 04:59

## 2020-10-15 RX ADMIN — OXYCODONE HYDROCHLORIDE 5 MG: 5 TABLET ORAL at 17:07

## 2020-10-15 RX ADMIN — AMLODIPINE BESYLATE 5 MG: 5 TABLET ORAL at 04:59

## 2020-10-15 RX ADMIN — GABAPENTIN 100 MG: 100 CAPSULE ORAL at 12:36

## 2020-10-15 RX ADMIN — HYDROXYZINE HYDROCHLORIDE 25 MG: 50 TABLET, FILM COATED ORAL at 12:36

## 2020-10-15 RX ADMIN — DIVALPROEX SODIUM 125 MG: 125 CAPSULE ORAL at 04:59

## 2020-10-15 ASSESSMENT — PAIN DESCRIPTION - PAIN TYPE
TYPE: ACUTE PAIN
TYPE: ACUTE PAIN

## 2020-10-15 NOTE — PROCEDURES
Vascular Access Team    Date of Insertion: 10/15/2020  Arm Circumference: n/a  Line Length: 10  Line Size: 20  Vein Occupancy %: 32  Reason for Midline: access  Labs: on 10/14/2020 WBC 6.0, , BUN na, Cr na, GFR na, INR na    Orders confirmed, vessel patency confirmed with ultrasound. Risks and benefits of procedure explained to patient and education regarding line associated bloodstream infections provided. Questions answered.     PowerGlide Midline placed in RUE per licensed provider order with ultrasound guidance. 20g, 10 cm line placed in basilic vein after 1 attempt(s).  Catheter inserted with brisk blood return. Secured with 0cm external from insertion site.  Line flushed without resistance with 10 mL 0.9% normal saline.  Midline secured with Biopatch and Tegaderm.     Midline placement is confirmed by nurse using ultrasound and ability to flush and draw blood. Midline is appropriate for use at this time.  No X-ray is needed for placement confirmation. Pt tolerated procedure well.  Patient condition relayed to unit RN or ordering physician via this post procedure note in the EMR.    Ultrasound images uploaded to PACS and viewable in the EMR - yes  Ultrasound imaged printed and placed in paper chart - no      BARD PowerGlide Midline ref # [id number], Lot # [id number], Expiration Date 07/31/2021

## 2020-10-15 NOTE — PROGRESS NOTES
"US guide placed by certified RN Isaura. This RN left to get IV abx and by the time RN returned to room pt pulled out IV. Pt stated \"I didn't think I needed it anymore.\"     Currently no doses of Meropenem have been administered due to lack of access.     Naye ELLER notified. She will come speak with patient about IV access.   "

## 2020-10-15 NOTE — CARE PLAN
Problem: Safety  Goal: Will remain free from falls  Outcome: PROGRESSING AS EXPECTED     Problem: Pain Management  Goal: Pain level will decrease to patient's comfort goal  Outcome: PROGRESSING AS EXPECTED     Problem: Mobility  Goal: Risk for activity intolerance will decrease  Outcome: PROGRESSING AS EXPECTED

## 2020-10-15 NOTE — CARE PLAN
Problem: Safety  Goal: Will remain free from falls  Outcome: PROGRESSING AS EXPECTED     Problem: Knowledge Deficit  Goal: Knowledge of disease process/condition, treatment plan, diagnostic tests, and medications will improve  Outcome: PROGRESSING SLOWER THAN EXPECTED  Goal: Knowledge of the prescribed therapeutic regimen will improve  Outcome: PROGRESSING SLOWER THAN EXPECTED    1:1 sit at bedside. PT resting in bed, eyes closed, respirations quiet and easy. No visual signs or symptoms of pain or discomfort noted thus far. Safety maintained, call bell within reach

## 2020-10-16 PROCEDURE — 700102 HCHG RX REV CODE 250 W/ 637 OVERRIDE(OP): Performed by: NURSE PRACTITIONER

## 2020-10-16 PROCEDURE — 99232 SBSQ HOSP IP/OBS MODERATE 35: CPT | Performed by: NURSE PRACTITIONER

## 2020-10-16 PROCEDURE — 97606 NEG PRS WND THER DME>50 SQCM: CPT

## 2020-10-16 PROCEDURE — A9270 NON-COVERED ITEM OR SERVICE: HCPCS | Performed by: NURSE PRACTITIONER

## 2020-10-16 PROCEDURE — 700111 HCHG RX REV CODE 636 W/ 250 OVERRIDE (IP): Performed by: NURSE PRACTITIONER

## 2020-10-16 PROCEDURE — 700105 HCHG RX REV CODE 258: Performed by: NURSE PRACTITIONER

## 2020-10-16 PROCEDURE — 770001 HCHG ROOM/CARE - MED/SURG/GYN PRIV*

## 2020-10-16 RX ADMIN — Medication 400 MG: at 18:18

## 2020-10-16 RX ADMIN — OXYCODONE HYDROCHLORIDE 5 MG: 5 TABLET ORAL at 13:51

## 2020-10-16 RX ADMIN — Medication 400 MG: at 06:28

## 2020-10-16 RX ADMIN — MEROPENEM 500 MG: 500 INJECTION, POWDER, FOR SOLUTION INTRAVENOUS at 06:29

## 2020-10-16 RX ADMIN — OXYCODONE HYDROCHLORIDE 5 MG: 5 TABLET ORAL at 23:41

## 2020-10-16 RX ADMIN — GABAPENTIN 100 MG: 100 CAPSULE ORAL at 12:31

## 2020-10-16 RX ADMIN — MEROPENEM 500 MG: 500 INJECTION, POWDER, FOR SOLUTION INTRAVENOUS at 18:18

## 2020-10-16 RX ADMIN — DIVALPROEX SODIUM 125 MG: 125 CAPSULE ORAL at 20:28

## 2020-10-16 RX ADMIN — DIVALPROEX SODIUM 125 MG: 125 CAPSULE ORAL at 13:51

## 2020-10-16 RX ADMIN — GABAPENTIN 100 MG: 100 CAPSULE ORAL at 06:28

## 2020-10-16 RX ADMIN — DIVALPROEX SODIUM 125 MG: 125 CAPSULE ORAL at 06:28

## 2020-10-16 RX ADMIN — MEROPENEM 500 MG: 500 INJECTION, POWDER, FOR SOLUTION INTRAVENOUS at 12:31

## 2020-10-16 RX ADMIN — AMLODIPINE BESYLATE 5 MG: 5 TABLET ORAL at 06:28

## 2020-10-16 RX ADMIN — OXYCODONE HYDROCHLORIDE 5 MG: 5 TABLET ORAL at 06:28

## 2020-10-16 RX ADMIN — MEROPENEM 500 MG: 500 INJECTION, POWDER, FOR SOLUTION INTRAVENOUS at 23:41

## 2020-10-16 RX ADMIN — ACETAMINOPHEN 650 MG: 325 TABLET, FILM COATED ORAL at 15:19

## 2020-10-16 RX ADMIN — OXYCODONE HYDROCHLORIDE 5 MG: 5 TABLET ORAL at 20:28

## 2020-10-16 RX ADMIN — RISPERIDONE 0.5 MG: 0.5 TABLET ORAL at 06:28

## 2020-10-16 RX ADMIN — MEROPENEM 500 MG: 500 INJECTION, POWDER, FOR SOLUTION INTRAVENOUS at 01:20

## 2020-10-16 ASSESSMENT — PAIN DESCRIPTION - PAIN TYPE
TYPE: ACUTE PAIN

## 2020-10-17 PROBLEM — R52 ACUTE PAIN: Status: ACTIVE | Noted: 2020-10-17

## 2020-10-17 PROCEDURE — A9270 NON-COVERED ITEM OR SERVICE: HCPCS | Performed by: NURSE PRACTITIONER

## 2020-10-17 PROCEDURE — 700102 HCHG RX REV CODE 250 W/ 637 OVERRIDE(OP): Performed by: NURSE PRACTITIONER

## 2020-10-17 PROCEDURE — 99232 SBSQ HOSP IP/OBS MODERATE 35: CPT | Performed by: INTERNAL MEDICINE

## 2020-10-17 PROCEDURE — 770001 HCHG ROOM/CARE - MED/SURG/GYN PRIV*

## 2020-10-17 PROCEDURE — 700111 HCHG RX REV CODE 636 W/ 250 OVERRIDE (IP): Performed by: NURSE PRACTITIONER

## 2020-10-17 PROCEDURE — 700101 HCHG RX REV CODE 250: Performed by: NURSE PRACTITIONER

## 2020-10-17 PROCEDURE — 99231 SBSQ HOSP IP/OBS SF/LOW 25: CPT | Performed by: NURSE PRACTITIONER

## 2020-10-17 PROCEDURE — 700105 HCHG RX REV CODE 258: Performed by: NURSE PRACTITIONER

## 2020-10-17 RX ORDER — GABAPENTIN 100 MG/1
200 CAPSULE ORAL 3 TIMES DAILY
Status: DISCONTINUED | OUTPATIENT
Start: 2020-10-17 | End: 2021-01-25

## 2020-10-17 RX ORDER — LIDOCAINE 50 MG/G
1 PATCH TOPICAL EVERY 24 HOURS
Status: DISCONTINUED | OUTPATIENT
Start: 2020-10-17 | End: 2021-05-03 | Stop reason: HOSPADM

## 2020-10-17 RX ORDER — OXYCODONE HYDROCHLORIDE 5 MG/1
5 TABLET ORAL EVERY 4 HOURS PRN
Status: DISCONTINUED | OUTPATIENT
Start: 2020-10-17 | End: 2020-10-24

## 2020-10-17 RX ORDER — CYCLOBENZAPRINE HCL 10 MG
10 TABLET ORAL 3 TIMES DAILY PRN
Status: DISCONTINUED | OUTPATIENT
Start: 2020-10-17 | End: 2021-05-03 | Stop reason: HOSPADM

## 2020-10-17 RX ORDER — ACETAMINOPHEN 500 MG
1000 TABLET ORAL 3 TIMES DAILY
Status: COMPLETED | OUTPATIENT
Start: 2020-10-17 | End: 2020-10-22

## 2020-10-17 RX ORDER — OXYCODONE HYDROCHLORIDE 10 MG/1
10 TABLET ORAL
Status: DISCONTINUED | OUTPATIENT
Start: 2020-10-17 | End: 2020-10-24

## 2020-10-17 RX ADMIN — RISPERIDONE 0.5 MG: 0.5 TABLET ORAL at 04:06

## 2020-10-17 RX ADMIN — Medication 400 MG: at 04:06

## 2020-10-17 RX ADMIN — Medication 400 MG: at 18:33

## 2020-10-17 RX ADMIN — MEROPENEM 500 MG: 500 INJECTION, POWDER, FOR SOLUTION INTRAVENOUS at 12:09

## 2020-10-17 RX ADMIN — CYCLOBENZAPRINE 10 MG: 10 TABLET, FILM COATED ORAL at 12:09

## 2020-10-17 RX ADMIN — MEROPENEM 500 MG: 500 INJECTION, POWDER, FOR SOLUTION INTRAVENOUS at 23:51

## 2020-10-17 RX ADMIN — ACETAMINOPHEN 1000 MG: 500 TABLET ORAL at 18:33

## 2020-10-17 RX ADMIN — OXYCODONE HYDROCHLORIDE 5 MG: 5 TABLET ORAL at 02:53

## 2020-10-17 RX ADMIN — OXYCODONE HYDROCHLORIDE 10 MG: 10 TABLET ORAL at 12:09

## 2020-10-17 RX ADMIN — ACETAMINOPHEN 1000 MG: 500 TABLET ORAL at 12:09

## 2020-10-17 RX ADMIN — DIVALPROEX SODIUM 125 MG: 125 CAPSULE ORAL at 04:06

## 2020-10-17 RX ADMIN — OXYCODONE 5 MG: 5 TABLET ORAL at 23:52

## 2020-10-17 RX ADMIN — RISPERIDONE 1 MG: 1 TABLET ORAL at 18:33

## 2020-10-17 RX ADMIN — GABAPENTIN 100 MG: 100 CAPSULE ORAL at 04:06

## 2020-10-17 RX ADMIN — DIVALPROEX SODIUM 125 MG: 125 CAPSULE ORAL at 14:10

## 2020-10-17 RX ADMIN — DIVALPROEX SODIUM 125 MG: 125 CAPSULE ORAL at 23:52

## 2020-10-17 RX ADMIN — AMLODIPINE BESYLATE 5 MG: 5 TABLET ORAL at 04:06

## 2020-10-17 RX ADMIN — MEROPENEM 500 MG: 500 INJECTION, POWDER, FOR SOLUTION INTRAVENOUS at 05:56

## 2020-10-17 RX ADMIN — OXYCODONE HYDROCHLORIDE 5 MG: 5 TABLET ORAL at 05:56

## 2020-10-17 RX ADMIN — GABAPENTIN 200 MG: 100 CAPSULE ORAL at 18:33

## 2020-10-17 RX ADMIN — GABAPENTIN 200 MG: 100 CAPSULE ORAL at 12:09

## 2020-10-17 RX ADMIN — LIDOCAINE 1 PATCH: 50 PATCH CUTANEOUS at 14:10

## 2020-10-17 RX ADMIN — MEROPENEM 500 MG: 500 INJECTION, POWDER, FOR SOLUTION INTRAVENOUS at 18:33

## 2020-10-17 RX ADMIN — OXYCODONE HYDROCHLORIDE 5 MG: 5 TABLET ORAL at 09:01

## 2020-10-17 RX ADMIN — OXYCODONE HYDROCHLORIDE 10 MG: 10 TABLET ORAL at 16:17

## 2020-10-17 ASSESSMENT — ENCOUNTER SYMPTOMS
DIZZINESS: 0
BLURRED VISION: 0
FALLS: 0
VOMITING: 0
ABDOMINAL PAIN: 0
DOUBLE VISION: 0
PALPITATIONS: 0
INSOMNIA: 0
BACK PAIN: 1
FEVER: 0
SHORTNESS OF BREATH: 0
NAUSEA: 0
CHILLS: 0
NERVOUS/ANXIOUS: 0
DIARRHEA: 0
COUGH: 1
HEADACHES: 0
MYALGIAS: 1
WEAKNESS: 1

## 2020-10-17 ASSESSMENT — PAIN DESCRIPTION - PAIN TYPE
TYPE: ACUTE PAIN

## 2020-10-17 NOTE — WOUND TEAM
[facility]  Inpatient Services   Wound and Skin Care Progress Note    Admission Date: 8/7/2020     Last order of IP CONSULT TO WOUND CARE was found on 9/1/2020 from Hospital Encounter on 8/7/2020     HPI, PMH, SH: Reviewed    Unit where seen by Wound Team: [id number]     WOUND CONSULT/FOLLOW UP RELATED TO:  Left leg scheduled NPWT change     Self Report / Pain Level: pt yelling in pain with VAC removal, used liquid lido and soaked dressing with saline    OBJECTIVE:  Vac dressing in place, 2 layer     WOUND TYPE, LOCATION, CHARACTERISTICS (Pressure Injuries: location, stage, POA or date identified)                Negative Pressure Wound Therapy 09/03/20 Leg Lower;Medial;Posterior Left (Active)   NPWT Pump Mode / Pressure Setting Ulta;Continuous;125 mmHg 10/16/20 1800   Dressing Type Medium;Black Foam (Regular) 10/16/20 1800   Number of Foam Pieces Used 4 10/14/20 1100   Canister Changed No 10/16/20 1800   Output (mL) 900 mL 10/14/20 0200   NEXT Dressing Change/Treatment Date 10/16/20 10/14/20 1100   VAC VeraFlo Irrigant Other (Comments) 10/14/20 1100   VAC VeraFlo Soak Time (mins) 0 10/14/20 1100   VAC VeraFlo Instill Volume (ml) 0 10/14/20 1100   VAC VeraFlo - Therapy Time (hrs) 0 10/14/20 1100   VAC VeraFlo Pressure (mm/Hg) Continuous;125 mmHg 10/12/20 1400   WOUND NURSE ONLY - Time Spent with Patient (mins) 90 10/14/20 1100           Wound 08/07/20 Full Thickness Wound Leg LLE posterior, extends medially and laterally (Active)   Wound Image      10/14/20 1100   Site Assessment Red;Tan 10/16/20 1800   Periwound Assessment Red;Rash;Edema 10/16/20 1800   Margins Attached edges;Defined edges 10/16/20 1800   Closure Secondary intention 10/16/20 1800   Drainage Amount Moderate 10/16/20 1800   Drainage Description Serosanguineous 10/16/20 1800   Treatments Site care;Cleansed 10/16/20 1800   Wound Cleansing Approved Wound Cleanser 10/16/20 1800   Periwound Protectant Skin Protectant Wipes to Periwound;Drape  10/16/20 1800   Dressing Cleansing/Solutions Not Applicable 10/16/20 1800   Dressing Options Compression Wrap Two Layer;Silver Powder;Wound Vac 10/16/20 1800   Dressing Changed Changed 10/16/20 1800   Dressing Status Clean;Dry;Intact 10/16/20 1800   Dressing Change/Treatment Frequency By Wound Team Only 10/16/20 1800   NEXT Dressing Change/Treatment Date 10/19/20 10/16/20 1800   NEXT Weekly Photo (Inpatient Only) 10/21/20 10/14/20 1100   Non-staged Wound Description Full thickness 10/16/20 1800   Wound Length (cm) 30 cm 10/14/20 1100   Wound Width (cm) 15 cm 10/14/20 1100   Wound Depth (cm) 0.3 cm 10/14/20 1100   Wound Surface Area (cm^2) 450 cm^2 10/14/20 1100   Wound Volume (cm^3) 135 cm^3 10/14/20 1100   Post-Procedure Length (cm) 30 cm 10/14/20 1100   Post-Procedure Width (cm) 15 cm 10/14/20 1100   Post-Procedure Depth (cm) 0.3 cm 10/14/20 1100   Post-Procedure Surface Area (cm^2) 450 cm^2 10/14/20 1100   Post-Procedure Volume (cm^3) 135 cm^3 10/14/20 1100   Wound Healing % -156 10/14/20 1100   Wound Bed Granulation (%) 20 % 10/14/20 1100   Wound Bed Epithelium (%) 0 % 20 1300   Wound Bed Slough (%) 80 % 10/14/20 1100   Wound Bed Granulation (%) - Post-Procedure 100 % 10/14/20 1100   Wound Bed Epithelium % - (Post-Procedure) 0 % 20 1300   Wound Bed Slough % - (Post-Procedure) 0 % 10/07/20 1100   Wound Bed Eschar % - (Post-Procedure) 0 % 20 1300   Tunneling (cm) 0 cm 20 1300   Undermining (cm) 0 cm 20 1300   Shape irregular 10/16/20 1800   Wound Odor Mild 10/16/20 1800   Pulses Left;2+;DP;PT 10/14/20 1100   Exposed Structures None 10/16/20 1800   WOUND NURSE ONLY - Time Spent with Patient (mins) 90 10/16/20 1800          VASCULAR    KOSTAS:   KOSTAS Results, Last 30 Days Us-kostas Single Level Bilat    Result Date: 2020  Narrative  Vascular Laboratory  Conclusions  1.  Normal bilateral KOSTAS's.  SANJUANITA GRUPO  Age:    64    Gender:     M  MRN:    [id number]  :    1955      BSA:  Exam  Date:     09/02/2020 09:59  Room #:     Inpatient  Priority:     Routine  Ht (in):             Wt (lb):  Ordering Physician:        EDDA CANADA  Referring Physician:       [id number] Morrissey  Sonographer:               Deja Ellis RDMS                             RVT  Study Type:                Complete Bilateral  Technical Quality:         Adequate  Indications:     Ulceration of LE  CPT Codes:       46858  ICD Codes:       707.1  History:         Nonhealing wound of left lower extremity.  Limitations:                 RIGHT  Waveform            Systolic BPs (mmHg)                             117           Brachial  Triphasic                                Common Femoral  Triphasic                  138           Posterior Tibial  Triphasic                  132           Dorsalis Pedis                                           Peroneal                             1.18          KOSTAS                                           TBI                       LEFT  Waveform        Systolic BPs (mmHg)                             114           Brachial  Triphasic                                Common Femoral  Triphasic                  127           Posterior Tibial  Triphasic                  122           Dorsalis Pedis                                           Peroneal                             1.09          KOSTAS                                           TBI  Findings  Right.  Doppler waveforms of the common femoral artery are of high amplitude and  triphasic.  Doppler waveforms at the ankle are brisk and triphasic.  Ankle-brachial index is normal.  Left.  Doppler waveforms of the common femoral artery are of high amplitude and  triphasic.  Doppler waveforms at the ankle are brisk and triphasic.  Ankle-brachial index is normal.  Unable to obtained popliteal waveforms due to wound bandages.  Additional testing was not performed in accordance with lower  extremity  arterial evaluation protocol.  Clover CARLTON Rickylashae  (Electronically Signed)  Final Date:      02 September 2020                   11:14    Lab Values:    Lab Results   Component Value Date/Time    WBC 6.0 10/14/2020 11:43 AM    RBC 3.87 (L) 10/14/2020 11:43 AM    HEMOGLOBIN 11.0 (L) 10/14/2020 11:43 AM    HEMATOCRIT 33.9 (L) 10/14/2020 11:43 AM    CREACTPROT 1.07 (H) 08/12/2020 01:55 PM    SEDRATEWES 85 (H) 08/07/2020 01:20 PM    HBA1C 5.7 (H) 11/09/2018 06:30 PM        Culture Results show:  Recent Results (from the past 720 hour(s))   CULTURE WOUND W/ GRAM STAIN    Collection Time: 09/01/20  2:00 PM    Specimen: Left Leg; Wound   Result Value Ref Range    Significant Indicator POS (POS)     Source WND     Site LEFT LEG     Culture Result - (A)     Gram Stain Result       Moderate Gram positive cocci.  Moderate Gram positive rods.      Culture Result (A)      Beta Hemolytic Streptococcus group A  Moderate growth      Culture Result (A)      Methicillin Resistant Staphylococcus aureus  Moderate growth      Culture Result (A)      Diphtheroids  Moderate growth  Mixed morphologies.         INTERVENTIONS BY WOUND TEAM:    Patient seen with Denise ELLER.  Dressings removed and wound and meredith wound cleansed with wound cleanser.  Meredith-wound prepped with no sting skin prep crusted with silver powder and draped meredith wound so foam could be placed over protected edges. Argales powder to wound bed, black foam to wound bed and extending over edges ensuring drape was under foam where it was atop of skin.  sealed with drape and TRAC pad applied to proximal wound bed at thigh. Sacral mepilex applied superior thigh    Resumed NPWT at 125mmHg,no leaks noted. 2 layer compression wrap applied to left LE.    Interdisciplinary consultation: Patient, Bedside RN, Denise ELLER    EVALUATION / RATIONALE FOR TREATMENT:    10/16, wound friable pt now on iv abx per ID.  Indurated edges addressed with drape and foam.  Meredith wound  likely has yeast infection - addressing with silver powder crusting  10/14: patient seen with Asaf ELLER, stopping veraflo instillation.  Starting silver powder and regular wound VAC to see if it will help with bioburden.  Possible colonization of bacteria.  ID involved.   10/12: Inflammation noted to the proximal part of the wound bed.  Will continue to monitor, possible vac break on Wednesday to help addison-skin inflammation.   10/7: Excisional debridement performed by Asaf ELLER. Will need to continue selective debridement with NPWT changes, and weekly excisional debridement with APRN to flatten out the scar tissue.  IV abx therapy ended 10/5.   10/5: Lateral wound still with some slough, both wounds smaller per measurements. Medial wound with all granular tissue.  9/30: Patient to start abx therapy for 7 days course per Dr. Ellis.  Started dakin's instillation to veraflo  9/28: malodorous today, culture taken.    9/25: Odor noted, though unclear if from wound or pt, consider shower before next Vac change. Consider regular vac next change, wound granular and superficial.   9/23: Patient still awaiting guardianship for placement.   9/18: wound granulating nicely and responding well to current treatment.    9/16: Wound bed with granular tissue, edges are thick but appear better than previous assessments. . NPWT VF to encourage closure by secondary intention and manage drainage while encouraging oxygenation and granulation to the wound bed. 2 layer compression to assist in decrease of swelling and encourage blood flow to wounds. Wound team to follow up and change 3x/week.      Goals: Steady decrease in wound area and depth weekly.    NURSING PLAN OF CARE ORDERS (X):    Dressing changes: See Dressing Care orders: X  Skin care: See Skin Care orders:   Rectal tube care: See Rectal Tube Care orders:   Other orders:      WOUND TEAM PLAN OF CARE:   Dressing changes by wound team:                   Follow  up 3 times weekly:                NPWT change 3 times weekly:   X  Follow up 1-2 times weekly:      Follow up Bi-Monthly:                   Follow up as needed:       Other (explain):     Anticipated discharge plans: pending guardianship.   LTACH:        SNF/Rehab: X - patient will need ongoing wound care for LLE upon discharge - 3x/week.                 Home Health Care:           Outpatient Wound Center:            Self Care:

## 2020-10-17 NOTE — PROGRESS NOTES
Hospital Medicine Twice Weekly Progress Note    Date of Service  10/17/2020    Chief Complaint  LLE Wound    Hospital Course   Mr. Varela is a 64-year-old male with PMH significant for homelessness, etoh use, tobacco dependence and chronic LLE wound who presented 8/7/2020 with LLE wound infection.  He was hospitalized at our facility in April and evaluated by orthopedic surgery who recommended wound care.  Wound cultures at that time grew MSSA and strep. Patient reported losing his Medicaid card and having no transportation to wound clinic.  He was seen at [facility] earlier this week and prescribed ABX, however he has not filled the prescription.  US LLE negative for DVT.  Treated for sepsis with empiric ABX and wound care. He completed ABX course 9/16. He was found to have head lice and underwent treatments. Continued to have intermittent agitation so was started on risperdol, depakote and gabapentin per psych recs.  He has been deemed incapacitated to make medical decisions and is currently pending guardianship and placement.  He is medically stable and will be only be evaluated 2 times weekly unless there is a clinical change. Repeat wound culture 9/28 grew Strep A and proteus. ID was consulted and recommended 5-day IV ABX with Zosyn - completed ABX 10/5. Wound care team noticed increased inflammation to the proximal part of the wound bed on 10/12.  They switched from vera flow wound VAC to regular wound VAC.  ID was consulted again on 10/14 and recommended 7-day course ABX with meropenem with anticipated stop date 10/22.        Interval Problem Update  10/14 - foul smelling drainage again noted from wound today. Also increased pain with debridement. Veriflow wound vac switched to standard wound vac. No leukocytosis/afebrile. Discussed again with ID today.  10/17 - less emotionally labile. Continues with 1:1 sitter for safety due to impulsivity.  -worsening pain LLE (especially during dressing  changes/debridements).  I have ordered scheduled Tylenol, increased his gabapentin, increased his oxycodone, ordered lidocaine patches and heat packs today.    Consultants/Specialty  Infectious Disease  Psychiatry    Code Status  Full Code    Disposition  Pending guardianship and placement - likely Group Home     Review of Systems  Review of Systems   Constitutional: Positive for malaise/fatigue. Negative for chills and fever.   HENT: Negative.    Eyes: Negative for blurred vision and double vision.   Respiratory: Positive for cough (chronic). Negative for shortness of breath.    Cardiovascular: Negative for chest pain and palpitations.   Gastrointestinal: Negative for nausea and vomiting.   Genitourinary: Negative for dysuria and urgency.   Musculoskeletal: Positive for back pain. Negative for falls and joint pain.        LLE pain around wound   Skin:        LLE wound hurts more today   Neurological: Positive for weakness. Negative for dizziness and headaches.   Psychiatric/Behavioral: The patient is not nervous/anxious and does not have insomnia.    All other systems reviewed and are negative.       Physical Exam  Temp:  [36.3 °C (97.3 °F)-36.8 °C (98.3 °F)] 36.3 °C (97.3 °F)  Pulse:  [68-79] 68  Resp:  [16-18] 16  BP: (107-125)/(65-76) 116/76  SpO2:  [94 %-98 %] 95 %    Physical Exam  Vitals signs and nursing note reviewed. Exam conducted with a chaperone present.   Constitutional:       General: He is awake. He is not in acute distress.     Appearance: He is underweight. He is ill-appearing. He is not toxic-appearing or diaphoretic.   HENT:      Head: Normocephalic and atraumatic.      Nose: Nose normal.      Mouth/Throat:      Lips: Pink.      Mouth: Mucous membranes are moist.   Eyes:      General: No scleral icterus.     Conjunctiva/sclera: Conjunctivae normal.   Neck:      Musculoskeletal: Normal range of motion.   Cardiovascular:      Rate and Rhythm: Normal rate.   Pulmonary:      Effort: Pulmonary effort  is normal.   Abdominal:      General: There is no distension.      Tenderness: There is no abdominal tenderness.   Musculoskeletal:      Right lower leg: No edema.      Left lower leg: No edema.      Comments: Ambulates independently with steady gait.   Skin:     General: Skin is warm and dry.      Comments: Left leg wound - Veriflow wound vac in place, functioning appropriately    Neurological:      Mental Status: He is alert.      Coordination: Coordination is intact.      Gait: Gait is intact. Gait normal.      Comments: Oriented to himself and hospital   Psychiatric:         Mood and Affect: Mood normal. Affect is labile (better today).         Behavior: Behavior is not aggressive or combative. Behavior is cooperative.         Cognition and Memory: Cognition is impaired.         Judgment: Judgment is impulsive.      Comments:            Fluids    Intake/Output Summary (Last 24 hours) at 10/17/2020 1220  Last data filed at 10/17/2020 0900  Gross per 24 hour   Intake 4080 ml   Output 0 ml   Net 4080 ml       Laboratory                        Imaging  IR-MIDLINE CATHETER INSERTION WO GUIDANCE > AGE 3   Final Result                  Ultrasound-guided midline placement performed by qualified nursing staff    as above.          IR-US GUIDED PIV   Final Result    Ultrasound-guided PERIPHERAL IV INSERTION performed by    qualified nursing staff as above.      IR-MIDLINE CATHETER INSERTION WO GUIDANCE > AGE 3   Final Result                  Ultrasound-guided midline placement performed by qualified nursing staff    as above.          US-KOSTAS SINGLE LEVEL BILAT   Final Result      CT-HEAD W/O   Final Result      No acute intracranial abnormality      DX-TIBIA AND FIBULA LEFT   Final Result      1.  Healed distal metaphyseal fractures of the left fibula and tibia with tibial IM layla in place.      2.  No acute fracture or dislocation.      3.  No radiopaque soft tissue foreign body.      DX-FEMUR-2+ LEFT   Final Result       1.  No radiographic evidence of acute traumatic injury left femur.      2.  Unchanged cortical thickening in the posterior aspect of the distal femoral diaphysis which may represent sequela of prior injury or infection.      3.  No soft tissue foreign body identified.      US-EXTREMITY VENOUS LOWER UNILAT LEFT   Final Result      DX-CHEST-PORTABLE (1 VIEW)   Final Result      No acute cardiopulmonary abnormality.           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   -blood cultures (-)   -afebrile. No Leukocytosis   -LPS following for serial debridements  -Continue wound vac per wound care   -Cx 9/1 Strep, MRSA, diptheroids: completed Zyvox 9/2 thru 9/16  -cx 9/28 strep A and proteus - ID consulted and recommended course of IV zosyn which completed on 10/5.  -10/17 - on 7-day course Meropenum per ID recommendations    Encephalopathy- (present on admission)  Assessment & Plan  -acute on chronic, likely due Wernicke's   -Psychiatry: incapacitated to make medical decision or leave AMA   -Requires guardianship and placement  -Avoid narcotics and hypnotics.  Cautious use of benzodiazepines for alcohol withdrawal protocol  -Frequent reorientation  -Sleep hygiene    Non-compliance  Assessment & Plan  -likely also component of psychiatric disorder and ETOH abuse  -Psychiatric consulted and suggests patient is incapacitated to make medical decisions or leave AMA  -ongoing encouragement for compliance.    Psychiatric disorder  Assessment & Plan  -unknown/unspecified  -psychiatry consulted and deemed him incapacitated to leave AMA or make medical decisions  -continue risperdol 0.5 -1mg BID, cont depakote 125mg TID added neurotin 100mg TID 8/16  -Pending guardianship. Application submitted 9/3.    Flexion contracture of knee, 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)  Assessment &  Plan  -secondary to chronic wound in that area  -PT OT  -encourage mobility    Acute pain  Assessment & Plan  -multifactorial: acute on chronic back pain without sciatica. No bony tenderness, no deformities. No recent trauma. Ambulatory independently at his baseline with steady gait. Main complaint is muscle spasms. I've ordered PRN Flexeril.  -increased pain LLE wound this week. I've increased his oxycodone today (10/17). Discussed with patient we will evaluate this every 3 days and wean as able.  -increased his Gabapentin today (10/17)  -hold all narcotics and muscle relaxer's for RR < 12 or altered mentation  -I've ordered scheduled Tylenol x 5 days  -I've ordered Lidocaine patch today (10/17)  -Encourage mobility  -heat packs      Emphysema/COPD (HCC)  Assessment & Plan  -chronic and stable without acute exacerbation    Alcohol dependence (HCC)- (present on admission)  Assessment & Plan  -history of   -abstinent since admission    Protein malnutrition (HCC)  Assessment & Plan  -history of ETOH and homelessness  -moderate/severe  -Body mass index is 20.33 kg/m².  -TID supplements  -encourage PO intake    Tobacco dependence- (present on admission)  Assessment & Plan  -abstinent since admission       VTE prophylaxis: Ambulatory    I have performed a physical exam and reviewed and updated ROS and Assessment/Plan today (10/17/20). In review of the previous note there are no changes except as documented above    I certify the patient requires continued medically necessary hospital services for the treatment of non-healing wound.  The patient will remain in the hospital for the foreseeable future.  Discharge may or may not occur in the next 20 days due to ongoing discharge delays due to having no medically acceptable discharge options.    Please note that this dictation was created using voice recognition software. I have made every reasonable attempt to correct obvious errors, but there may be errors of grammar and  possibly content that I did not discover before finalizing the note.    LOUISE Khan.

## 2020-10-17 NOTE — ASSESSMENT & PLAN NOTE
-multifactorial: acute on chronic back pain without sciatica. No bony tenderness, no deformities. No recent trauma. Ambulatory independently at his baseline with steady gait. Main complaint is muscle spasms.  -Continue oxycodone, flexeril, gabapentin and Lidoderm. Wean as tolerated   -hold all narcotics and muscle relaxer's for RR < 12 or altered mentation  -Encourage mobility  -heat packs    10/24: Added Toradol Q6 and decreased oxycodone frequency to Q6. Continue to wean oxy as tolerated   10/28:  Decrease PRN oxy dose to 10 mg Q6.

## 2020-10-17 NOTE — CARE PLAN
Problem: Safety  Goal: Will remain free from falls  Outcome: PROGRESSING AS EXPECTED   Patient remains free from falls, 1:1 sitter, patient has closed-toed shoes in place, call light within reach   Problem: Infection  Goal: Will remain free from infection  Outcome: PROGRESSING AS EXPECTED   Patient remains afebrile, VS q 8 hours, IV abx as ordered

## 2020-10-17 NOTE — CARE PLAN
Problem: Safety  Goal: Will remain free from falls  Outcome: PROGRESSING AS EXPECTED  Note: 1:1 safety sitter at bedside. Bed locked and in lowest position. Call light and belongings within reach.     Problem: Infection  Goal: Will remain free from infection  Outcome: PROGRESSING AS EXPECTED  Note: Pt educated on hand hygiene. Pt afebrile. Dressing to LLE CD&I.     Problem: Pain Management  Goal: Pain level will decrease to patient's comfort goal  Outcome: PROGRESSING SLOWER THAN EXPECTED  Note: Pt educated on non-pharmacological pain control interventions. Pt given heat packs.

## 2020-10-17 NOTE — PROGRESS NOTES
"WOUND CARE PROVIDER PROGRESS NOTE            HPI: 65-year-old male homelessness, EtOH use, tobacco dependence, chronic left lower extremity wound admitted 8/7/2020 with left lower extremity wound infection.  Patient was recently hospitalized at [facility] in April 2020, evaluated by orthopedic surgery who recommended wound care.  Wound culture grew MSSA and strep.  Patient was recently seen at [facility] prior to this admission was given a prescription for antibiotics but did not fill this.  Patient reports that he has had this wound for 8 to 10 years.  He reports that he fell and went \"ass over tea kettle\".  He reports that he would clean the wound almost daily with soap and water at the homeless shelter.  Per chart review, patient reported he developed the ulcer in May 2018 when he fell while hiking.  Patient was seen at wound care clinic in August 2018.  Patient had a  assisting him while he was living at [facility] care housing.  Wound VAC /was ordered however  had concerns with patient's compliancy and/ access to medical care.  Skilled nursing facility was contacted to have patient be admitted, however he was not accepted due to Medicaid.    Patient is on Lovenox 40 mg daily.  Refused today however he took it yesterday.  Allergies reviewed.  He denies any allergies.      Interval hx:   9/11/2020: pt calm cooperative. On zyvox. Seen during VAC and two layer compression wrap change. Pt tolerating VAC and wraps. Wound decreased in size.   9/18/2020: Patient denies any fevers, chills, nausea, vomiting.  He is tolerating the veraflo wound VAC and 2 layer compression wrap.  Wound is decreasing in size.  Increased granular tissue noted, wound edges have improved however he does have rolled edges to popliteal fossa area.  Patient calm and cooperative, watching sports.  9/30/2020: Denies fevers, chills, nausea, vomiting.  Tolerating wound VAC and 2 layer compression wrap.  Refused nail care.  Wound " culture positive for strep A and Proteus.  10/7/2020: completed 5 day course of zyvox. Denies fevers chills nausea vomiting.  Seen during veraflo VAC and 2 layer compression wrap change.    10/14/2020: Patient denies fevers, chills, nausea, vomiting.  Patient wound is malodorous complaining of increased pain to the wound.  Increased slough noted to wound bed despite veraflo wound VAC.  Reconsult ID.  10/16/2020: Patient seen during wound VAC change with [facility] wound care PT. patient denies fever, chills, nausea, vomiting.  Patient reports pain to wound and increase in pain with wound VAC change.  Patient has granular tissue throughout wound with mild amount of slough to posterior aspect of leg.  Malodorous with VAC removal.  Wound VAC nursing changed.    Results:  Wound culture: 9/28/2020: Positive for beta-hemolytic strep group A, Proteus.  Sensitivities pending    Wound cx: 9/1/2020 mrsa, diphtheroids, beta hemolytic strep group A    8/7/2020: X-ray tib-fib left:  1.  Healed distal metaphyseal fractures of the left fibula and tibia with tibial IM layla in place.     2.  No acute fracture or dislocation.     3.  No radiopaque soft tissue foreign body.      Arterial studies:   9/2/2020  Right.    Doppler waveforms of the common femoral artery are of high amplitude and    triphasic.    Doppler waveforms at the ankle are brisk and triphasic.    Ankle-brachial index is normal.       Left.    Doppler waveforms of the common femoral artery are of high amplitude and    triphasic.    Doppler waveforms at the ankle are brisk and triphasic.    Ankle-brachial index is normal.    Unable to obtained popliteal waveforms due to wound bandages.        Exam:  Left lower leg full-thickness ulcer  Malodorous full-thickness ulcer to left medial and posterior lower extremity.  Erythema to distal aspect of leg present but improved compared to previous photos.  Wound bed tissue is dark red with granulation.  Small amount of slough was  noted to posterior aspect of leg wound  Edges are attached.  Wound is extremely tender with light touch.      ASSESSMENT/PLAN:  65-year-old male with homelessness, EtOH use, tobacco use, and chronic left leg ulcer.  Patient seen with wound care team for wound VAC change.  Tissue was previously debrided on 10/14/2020.  There is granular tissue noted throughout wound with a mild mount of slough on the posterior aspect of leg.  There was odor upon removal of wound VAC.  ID was previously consulted and patient was started on meropenem IV.  Findings were discussed with Dr. Oscar ID.  Continue wound VAC therapy and 2 layer compression, antibiotics, offloading.    Discharge plan:  Pending guardianship      D/W: Patient, RN, [facility] wound care PT, Dr. Oscar

## 2020-10-17 NOTE — PROGRESS NOTES
Infectious Disease Progress Note    Author: Rohit Oscar M.D. Date & Time of service: 10/17/2020  9:28 AM    Chief Complaint:  Follow-up for chronic wounds    Interval History:  10/14 ID reconsulted due to new recurrent malodorous drainage from wound concerning for infection.  Patient remains afebrile, white count 6000  10/17 remains afebrile, no CBC from today.  Wound care reevaluated wounds yesterday, no odor, good granulation tissue, decreased surrounding erythema intensity, some maceration posteriorly    Labs Reviewed and Medications Reviewed.    Review of Systems:  Review of Systems   Constitutional: Negative for chills and fever.   Gastrointestinal: Negative for abdominal pain, diarrhea, nausea and vomiting.   Musculoskeletal: Positive for myalgias.   Skin: Negative for itching and rash.   All other systems reviewed and are negative.      Hemodynamics:  Temp (24hrs), Av.6 °C (97.8 °F), Min:36.3 °C (97.3 °F), Max:36.8 °C (98.3 °F)  Temperature: 36.3 °C (97.3 °F)  Pulse  Av.6  Min: 55  Max: 110   Blood Pressure : 116/76       Physical Exam:  Physical Exam  Vitals signs and nursing note reviewed.   Constitutional:       Appearance: He is not ill-appearing.      Comments: Thin   HENT:      Mouth/Throat:      Pharynx: No oropharyngeal exudate.      Comments: Poor dentition  Eyes:      General: No scleral icterus.        Right eye: No discharge.         Left eye: No discharge.      Conjunctiva/sclera: Conjunctivae normal.   Cardiovascular:      Rate and Rhythm: Normal rate.      Heart sounds: No murmur.   Pulmonary:      Effort: No respiratory distress.      Breath sounds: No wheezing.   Abdominal:      General: There is no distension.      Tenderness: There is no abdominal tenderness.   Musculoskeletal:      Comments: Lower extremities wrapped.  Wound care pictures reviewed, good granulation tissue base, surrounding erythema now less intense, small amount of maceration posteriorly, not malodorous per  last wound care check   Skin:     Comments: As above.   Neurological:      General: No focal deficit present.      Mental Status: He is alert.      Comments: Moving all extremities   Psychiatric:      Comments: Currently deemed incapacitated to leave hospital AMA per documentation         Meds:    Current Facility-Administered Medications:   •  hydrOXYzine HCl  •  meropenem  •  Notify provider if pain remains uncontrolled **AND** Use the numeric rating scale (NRS-11) on regular floors and Critical-Care Pain Observation Tool (CPOT) on ICUs/Trauma to assess pain **AND** Pulse Ox (Oximetry) **AND** [DISCONTINUED] Pharmacy Consult Request **AND** If patient difficult to arouse and/or has respiratory depression, stop any opiates that are currently infusing and call a Rapid Response. **AND** oxyCODONE immediate-release **AND** oxyCODONE immediate-release **AND** [DISCONTINUED] HYDROmorphone  •  senna-docusate **AND** polyethylene glycol/lytes **AND** [DISCONTINUED] magnesium hydroxide **AND** [DISCONTINUED] bisacodyl  •  risperiDONE  •  risperiDONE  •  haloperidol lactate  •  gabapentin  •  divalproex  •  magnesium oxide  •  amLODIPine  •  acetaminophen    Labs:  Recent Labs     10/14/20  1143   WBC 6.0   RBC 3.87*   HEMOGLOBIN 11.0*   HEMATOCRIT 33.9*   MCV 87.6   MCH 28.4   RDW 47.5   PLATELETCT 445   MPV 8.8*   NEUTSPOLYS 58.60   LYMPHOCYTES 22.00   MONOCYTES 14.10*   EOSINOPHILS 3.80   BASOPHILS 0.80     No results for input(s): SODIUM, POTASSIUM, CHLORIDE, CO2, GLUCOSE, BUN, CPKTOTAL in the last 72 hours.  No results for input(s): ALBUMIN, TBILIRUBIN, ALKPHOSPHAT, TOTPROTEIN, ALTSGPT, ASTSGOT, CREATININE in the last 72 hours.    Imaging:  Ir-midline Catheter Insertion Wo Guidance > Age 3    Result Date: 10/1/2020  HISTORY/REASON FOR EXAM:  Midline Placement   TECHNIQUE/EXAM DESCRIPTION AND NUMBER OF VIEWS: Midline insertion with ultrasound guidance.  FINDINGS: Midline insertion with Ultrasound Guidance was performed  by qualified nursing staff without the assistance of a Radiologist. Midline positioning as measured by RN or as appropriate length of catheter selected.              Ultrasound-guided midline placement performed by qualified nursing staff as above.       Micro:  Results     ** No results found for the last 168 hours. **          Assessment:  65-year-old male admitted 8/7/2020, known history of cognitive impairment, alcoholism, tobacco abuse, homelessness.  Patient has been dealing with a chronic wound on medial left leg initially sustained in May 2018 when he had a fall.  The wound worsened and he initially underwent debridement for necrotizing fasciitis at [facility] in May 2018.  He was admitted over a month prior to this admission due to concern for left lower extremity wound infection.  Wound cultures early this admission 8/7/2020 grew MSSA and group A strep.  He was also infested with lice.  Repeat wound cultures from 9/1 grew diphtheroids as well.  Patient received Zyvox from 9/2 - 9/16.  Wound care was continued and patient was initially doing well, but ID was reconsulted on 9/30/2020 due to concern for recurrent infection with increased slough, drainage, odor.  Wound culture from 9/28 grew group A strep and a multidrug resistant Proteus vulgaris.  Patient received a 5-day course of IV Zosyn with improvement.    ID reconsulted 10/14/2020 again due to recurrence of infection based on exam with malodorous drainage and surrounding erythema.    Plan:  -Last cultures with group A Strep and a multidrug resistant Proteus vulgaris.  The organism demonstrates signs of ampC induced cephalosporinase production  -Started IV meropenem 500 mg every 6 hours on 10/15.  Recommend completing 7-day course  -Continue wound care and wound VAC therapy per LPS. Wound care reevaluated wounds yesterday, no odor, good granulation tissue, decreased surrounding erythema intensity, some maceration posteriorly    Discussed with primary,  Naye ELLER    ID will sign off.  Please call with questions.

## 2020-10-17 NOTE — PROGRESS NOTES
Report given and RN assumed care. Patient is confused, disoriented to time. VSS. Patient reports pain in left leg. Assessment completed. Personal belongings and call light within reach. 1:1 sitter at bedside. Will continue to monitor patient.

## 2020-10-18 PROCEDURE — 700102 HCHG RX REV CODE 250 W/ 637 OVERRIDE(OP): Performed by: NURSE PRACTITIONER

## 2020-10-18 PROCEDURE — 700105 HCHG RX REV CODE 258: Performed by: NURSE PRACTITIONER

## 2020-10-18 PROCEDURE — 700101 HCHG RX REV CODE 250: Performed by: NURSE PRACTITIONER

## 2020-10-18 PROCEDURE — 770001 HCHG ROOM/CARE - MED/SURG/GYN PRIV*

## 2020-10-18 PROCEDURE — A9270 NON-COVERED ITEM OR SERVICE: HCPCS | Performed by: NURSE PRACTITIONER

## 2020-10-18 PROCEDURE — 700111 HCHG RX REV CODE 636 W/ 250 OVERRIDE (IP): Performed by: NURSE PRACTITIONER

## 2020-10-18 PROCEDURE — 700105 HCHG RX REV CODE 258

## 2020-10-18 RX ORDER — SODIUM CHLORIDE 9 MG/ML
INJECTION, SOLUTION INTRAVENOUS
Status: COMPLETED
Start: 2020-10-18 | End: 2020-10-18

## 2020-10-18 RX ADMIN — SODIUM CHLORIDE 500 ML: 9 INJECTION, SOLUTION INTRAVENOUS at 06:00

## 2020-10-18 RX ADMIN — Medication 400 MG: at 17:30

## 2020-10-18 RX ADMIN — RISPERIDONE 0.5 MG: 0.5 TABLET ORAL at 05:52

## 2020-10-18 RX ADMIN — ACETAMINOPHEN 1000 MG: 500 TABLET ORAL at 12:27

## 2020-10-18 RX ADMIN — DIVALPROEX SODIUM 125 MG: 125 CAPSULE ORAL at 05:49

## 2020-10-18 RX ADMIN — DIVALPROEX SODIUM 125 MG: 125 CAPSULE ORAL at 21:16

## 2020-10-18 RX ADMIN — Medication 400 MG: at 05:50

## 2020-10-18 RX ADMIN — MEROPENEM 500 MG: 500 INJECTION, POWDER, FOR SOLUTION INTRAVENOUS at 17:29

## 2020-10-18 RX ADMIN — DIVALPROEX SODIUM 125 MG: 125 CAPSULE ORAL at 14:18

## 2020-10-18 RX ADMIN — MEROPENEM 500 MG: 500 INJECTION, POWDER, FOR SOLUTION INTRAVENOUS at 12:27

## 2020-10-18 RX ADMIN — ACETAMINOPHEN 1000 MG: 500 TABLET ORAL at 21:15

## 2020-10-18 RX ADMIN — CYCLOBENZAPRINE 10 MG: 10 TABLET, FILM COATED ORAL at 17:30

## 2020-10-18 RX ADMIN — MEROPENEM 500 MG: 500 INJECTION, POWDER, FOR SOLUTION INTRAVENOUS at 05:52

## 2020-10-18 RX ADMIN — GABAPENTIN 200 MG: 100 CAPSULE ORAL at 05:51

## 2020-10-18 RX ADMIN — RISPERIDONE 1 MG: 1 TABLET ORAL at 17:29

## 2020-10-18 RX ADMIN — GABAPENTIN 200 MG: 100 CAPSULE ORAL at 12:27

## 2020-10-18 RX ADMIN — ACETAMINOPHEN 1000 MG: 500 TABLET ORAL at 05:50

## 2020-10-18 RX ADMIN — AMLODIPINE BESYLATE 5 MG: 5 TABLET ORAL at 05:50

## 2020-10-18 RX ADMIN — OXYCODONE 5 MG: 5 TABLET ORAL at 04:01

## 2020-10-18 RX ADMIN — OXYCODONE HYDROCHLORIDE 10 MG: 10 TABLET ORAL at 21:30

## 2020-10-18 RX ADMIN — GABAPENTIN 200 MG: 100 CAPSULE ORAL at 17:29

## 2020-10-18 RX ADMIN — DOCUSATE SODIUM 50 MG AND SENNOSIDES 8.6 MG 2 TABLET: 8.6; 5 TABLET, FILM COATED ORAL at 05:50

## 2020-10-18 RX ADMIN — OXYCODONE HYDROCHLORIDE 10 MG: 10 TABLET ORAL at 12:27

## 2020-10-18 RX ADMIN — OXYCODONE HYDROCHLORIDE 10 MG: 10 TABLET ORAL at 08:15

## 2020-10-18 RX ADMIN — OXYCODONE HYDROCHLORIDE 10 MG: 10 TABLET ORAL at 16:16

## 2020-10-18 RX ADMIN — LIDOCAINE 1 PATCH: 50 PATCH CUTANEOUS at 14:17

## 2020-10-18 ASSESSMENT — PAIN DESCRIPTION - PAIN TYPE
TYPE: ACUTE PAIN

## 2020-10-18 NOTE — CARE PLAN
Problem: Safety  Goal: Will remain free from falls  Outcome: PROGRESSING AS EXPECTED   Bed locked and in lowest position. Call light within reach. 1:1 sitter at bed side. Hourly rounding on going.   Problem: Infection  Goal: Will remain free from infection  Outcome: PROGRESSING AS EXPECTED   Patient has a wound in place to his left leg. Patient receives IV Abx per MAR.

## 2020-10-18 NOTE — CARE PLAN
Problem: Safety  Goal: Will remain free from falls  Outcome: PROGRESSING AS EXPECTED  Note: 1:1 sitter at bedside. Bed locked and in lowest position. Call light and belongings within reach.     Problem: Pain Management  Goal: Pain level will decrease to patient's comfort goal  Outcome: PROGRESSING AS EXPECTED  Note: Pt educated on non-pharmacological pain control interventions. Pain medication given per MAR.

## 2020-10-19 PROCEDURE — A9270 NON-COVERED ITEM OR SERVICE: HCPCS | Performed by: NURSE PRACTITIONER

## 2020-10-19 PROCEDURE — 700111 HCHG RX REV CODE 636 W/ 250 OVERRIDE (IP): Performed by: NURSE PRACTITIONER

## 2020-10-19 PROCEDURE — 700102 HCHG RX REV CODE 250 W/ 637 OVERRIDE(OP): Performed by: NURSE PRACTITIONER

## 2020-10-19 PROCEDURE — 770001 HCHG ROOM/CARE - MED/SURG/GYN PRIV*

## 2020-10-19 PROCEDURE — 700101 HCHG RX REV CODE 250: Performed by: NURSE PRACTITIONER

## 2020-10-19 PROCEDURE — 97606 NEG PRS WND THER DME>50 SQCM: CPT

## 2020-10-19 PROCEDURE — 700105 HCHG RX REV CODE 258: Performed by: NURSE PRACTITIONER

## 2020-10-19 PROCEDURE — 29581 APPL MULTLAYER CMPRN SYS LEG: CPT

## 2020-10-19 RX ADMIN — ACETAMINOPHEN 1000 MG: 500 TABLET ORAL at 18:33

## 2020-10-19 RX ADMIN — MEROPENEM 500 MG: 500 INJECTION, POWDER, FOR SOLUTION INTRAVENOUS at 06:44

## 2020-10-19 RX ADMIN — OXYCODONE HYDROCHLORIDE 10 MG: 10 TABLET ORAL at 21:33

## 2020-10-19 RX ADMIN — DIVALPROEX SODIUM 125 MG: 125 CAPSULE ORAL at 13:18

## 2020-10-19 RX ADMIN — ACETAMINOPHEN 1000 MG: 500 TABLET ORAL at 13:18

## 2020-10-19 RX ADMIN — LIDOCAINE 1 PATCH: 50 PATCH CUTANEOUS at 13:18

## 2020-10-19 RX ADMIN — GABAPENTIN 200 MG: 100 CAPSULE ORAL at 18:34

## 2020-10-19 RX ADMIN — OXYCODONE HYDROCHLORIDE 10 MG: 10 TABLET ORAL at 06:43

## 2020-10-19 RX ADMIN — MEROPENEM 500 MG: 500 INJECTION, POWDER, FOR SOLUTION INTRAVENOUS at 01:04

## 2020-10-19 RX ADMIN — GABAPENTIN 200 MG: 100 CAPSULE ORAL at 13:19

## 2020-10-19 RX ADMIN — MEROPENEM 500 MG: 500 INJECTION, POWDER, FOR SOLUTION INTRAVENOUS at 13:18

## 2020-10-19 RX ADMIN — RISPERIDONE 1 MG: 1 TABLET ORAL at 18:34

## 2020-10-19 RX ADMIN — OXYCODONE HYDROCHLORIDE 10 MG: 10 TABLET ORAL at 10:05

## 2020-10-19 RX ADMIN — MEROPENEM 500 MG: 500 INJECTION, POWDER, FOR SOLUTION INTRAVENOUS at 18:34

## 2020-10-19 RX ADMIN — OXYCODONE HYDROCHLORIDE 10 MG: 10 TABLET ORAL at 18:33

## 2020-10-19 RX ADMIN — ACETAMINOPHEN 1000 MG: 500 TABLET ORAL at 06:43

## 2020-10-19 RX ADMIN — OXYCODONE HYDROCHLORIDE 10 MG: 10 TABLET ORAL at 01:33

## 2020-10-19 RX ADMIN — DIVALPROEX SODIUM 125 MG: 125 CAPSULE ORAL at 21:06

## 2020-10-19 RX ADMIN — AMLODIPINE BESYLATE 5 MG: 5 TABLET ORAL at 06:43

## 2020-10-19 RX ADMIN — GABAPENTIN 200 MG: 100 CAPSULE ORAL at 06:44

## 2020-10-19 RX ADMIN — HYDROXYZINE HYDROCHLORIDE 25 MG: 50 TABLET, FILM COATED ORAL at 21:06

## 2020-10-19 RX ADMIN — DIVALPROEX SODIUM 125 MG: 125 CAPSULE ORAL at 06:44

## 2020-10-19 RX ADMIN — Medication 400 MG: at 18:34

## 2020-10-19 RX ADMIN — Medication 400 MG: at 06:43

## 2020-10-19 RX ADMIN — RISPERIDONE 0.5 MG: 0.5 TABLET ORAL at 06:43

## 2020-10-19 ASSESSMENT — PAIN DESCRIPTION - PAIN TYPE
TYPE: ACUTE PAIN

## 2020-10-19 NOTE — WOUND TEAM
[facility]  Inpatient Services   Wound and Skin Care Progress Note    Admission Date: 8/7/2020     Last order of IP CONSULT TO WOUND CARE was found on 9/1/2020 from Hospital Encounter on 8/7/2020     HPI, PMH, SH: Reviewed    Unit where seen by Wound Team: [id number]     WOUND CONSULT/FOLLOW UP RELATED TO:  Left leg scheduled NPWT change     Self Report / Pain Level: pt yelling in pain with VAC removal, used liquid lido and soaked dressing with saline    OBJECTIVE:  Vac dressing in place, 2 layer     WOUND TYPE, LOCATION, CHARACTERISTICS (Pressure Injuries: location, stage, POA or date identified)       Negative Pressure Wound Therapy 09/03/20 Leg Lower;Medial;Posterior Left (Active)   NPWT Pump Mode / Pressure Setting Ulta;Continuous;125 mmHg 10/19/20 1200   Dressing Type Medium;Black Foam (Regular) 10/19/20 1200   Number of Foam Pieces Used 3 10/19/20 1200   Canister Changed No 10/19/20 1200   Output (mL) 900 mL 10/14/20 0200   NEXT Dressing Change/Treatment Date 10/21/20 10/19/20 1200   VAC VeraFlo Irrigant Other (Comments) 10/14/20 1100   VAC VeraFlo Soak Time (mins) 0 10/14/20 1100   VAC VeraFlo Instill Volume (ml) 0 10/14/20 1100   VAC VeraFlo - Therapy Time (hrs) 0 10/14/20 1100   VAC VeraFlo Pressure (mm/Hg) Continuous;125 mmHg 10/18/20 2130   WOUND NURSE ONLY - Time Spent with Patient (mins) 90 10/14/20 1100           Wound 08/07/20 Full Thickness Wound Leg LLE posterior, extends medially and laterally (Active)   Wound Image      10/14/20 1100   Site Assessment Red;Yellow 10/19/20 1200   Periwound Assessment Clean;Dry;Intact;Scar tissue 10/19/20 1200   Margins Defined edges;Attached edges 10/19/20 1200   Closure Secondary intention 10/19/20 1200   Drainage Amount Moderate 10/19/20 1200   Drainage Description Serosanguineous 10/19/20 1200   Treatments Cleansed;Irrigation;Site care 10/19/20 1200   Wound Cleansing Approved Wound Cleanser 10/19/20 1200   Periwound Protectant Benzoin;Drape 10/19/20  1200   Dressing Cleansing/Solutions Normal Saline 10/19/20 1200   Dressing Options Wound Vac;Compression Wrap Two Layer 10/19/20 1200   Dressing Changed Changed 10/19/20 1200   Dressing Status Clean;Dry;Intact 10/19/20 1200   Dressing Change/Treatment Frequency By Wound Team Only 10/19/20 1200   NEXT Dressing Change/Treatment Date 10/21/20 10/19/20 1200   NEXT Weekly Photo (Inpatient Only) 10/21/20 10/14/20 1100   Non-staged Wound Description Full thickness 10/19/20 1200   Wound Length (cm) 30 cm 10/14/20 1100   Wound Width (cm) 15 cm 10/14/20 1100   Wound Depth (cm) 0.3 cm 10/14/20 1100   Wound Surface Area (cm^2) 450 cm^2 10/14/20 1100   Wound Volume (cm^3) 135 cm^3 10/14/20 1100   Post-Procedure Length (cm) 30 cm 10/14/20 1100   Post-Procedure Width (cm) 15 cm 10/14/20 1100   Post-Procedure Depth (cm) 0.3 cm 10/14/20 1100   Post-Procedure Surface Area (cm^2) 450 cm^2 10/14/20 1100   Post-Procedure Volume (cm^3) 135 cm^3 10/14/20 1100   Wound Healing % -156 10/14/20 1100   Wound Bed Granulation (%) 20 % 10/14/20 1100   Wound Bed Epithelium (%) 0 % 20 1300   Wound Bed Slough (%) 80 % 10/14/20 1100   Wound Bed Granulation (%) - Post-Procedure 100 % 10/14/20 1100   Wound Bed Epithelium % - (Post-Procedure) 0 % 20 1300   Wound Bed Slough % - (Post-Procedure) 0 % 10/07/20 1100   Wound Bed Eschar % - (Post-Procedure) 0 % 20 1300   Tunneling (cm) 0 cm 20 1300   Undermining (cm) 0 cm 20 1300   Shape Irregular 10/19/20 1200   Wound Odor Mild 10/19/20 1200   Pulses Left;2+;DP;PT 10/14/20 1100   Exposed Structures None 10/19/20 1200   WOUND NURSE ONLY - Time Spent with Patient (mins) 60 10/19/20 1200              VASCULAR    KOSTAS:   KOSTAS Results, Last 30 Days Us-kostas Single Level Bilat    Result Date: 2020  Narrative  Vascular Laboratory  Conclusions  1.  Normal bilateral KOSTAS's.  GRUPO GANN  Age:    64    Gender:     M  MRN:    [id number]  :    1955      BSA:  Exam Date:     2020  09:59  Room #:     Inpatient  Priority:     Routine  Ht (in):             Wt (lb):  Ordering Physician:        EDDA CANADA  Referring Physician:       [id number]  Sonographer:               Deja Ellis RDMS                             RVT  Study Type:                Complete Bilateral  Technical Quality:         Adequate  Indications:     Ulceration of LE  CPT Codes:       96801  ICD Codes:       707.1  History:         Nonhealing wound of left lower extremity.  Limitations:                 RIGHT  Waveform            Systolic BPs (mmHg)                             117           Brachial  Triphasic                                Common Femoral  Triphasic                  138           Posterior Tibial  Triphasic                  132           Dorsalis Pedis                                           Peroneal                             1.18          KOSTAS                                           TBI                       LEFT  Waveform        Systolic BPs (mmHg)                             114           Brachial  Triphasic                                Common Femoral  Triphasic                  127           Posterior Tibial  Triphasic                  122           Dorsalis Pedis                                           Peroneal                             1.09          KOSTAS                                           TBI  Findings  Right.  Doppler waveforms of the common femoral artery are of high amplitude and  triphasic.  Doppler waveforms at the ankle are brisk and triphasic.  Ankle-brachial index is normal.  Left.  Doppler waveforms of the common femoral artery are of high amplitude and  triphasic.  Doppler waveforms at the ankle are brisk and triphasic.  Ankle-brachial index is normal.  Unable to obtained popliteal waveforms due to wound bandages.  Additional testing was not performed in accordance with lower extremity  arterial  evaluation protocol.  Clover VINCENT Rickylashae  (Electronically Signed)  Final Date:      02 September 2020                   11:14    Lab Values:    Lab Results   Component Value Date/Time    WBC 6.0 10/14/2020 11:43 AM    RBC 3.87 (L) 10/14/2020 11:43 AM    HEMOGLOBIN 11.0 (L) 10/14/2020 11:43 AM    HEMATOCRIT 33.9 (L) 10/14/2020 11:43 AM    CREACTPROT 1.07 (H) 08/12/2020 01:55 PM    SEDRATEWES 85 (H) 08/07/2020 01:20 PM    HBA1C 5.7 (H) 11/09/2018 06:30 PM        Culture Results show:  Recent Results (from the past 720 hour(s))   CULTURE WOUND W/ GRAM STAIN    Collection Time: 09/01/20  2:00 PM    Specimen: Left Leg; Wound   Result Value Ref Range    Significant Indicator POS (POS)     Source WND     Site LEFT LEG     Culture Result - (A)     Gram Stain Result       Moderate Gram positive cocci.  Moderate Gram positive rods.      Culture Result (A)      Beta Hemolytic Streptococcus group A  Moderate growth      Culture Result (A)      Methicillin Resistant Staphylococcus aureus  Moderate growth      Culture Result (A)      Diphtheroids  Moderate growth  Mixed morphologies.         INTERVENTIONS BY WOUND TEAM:    In to see patient, patient sitting on the side of the bed, this wound care RN had patient take off his pants and lay on his stomach for dressing change.  Dressings removed and wound and meredith wound cleansed with NS and wound cleanser, patted dry with gauze.  Meredith-wound prepped with no sting skin prep crusted with silver powder and draped meredith wound so foam could be placed over protected edges. Argales powder to wound bed, black foam to wound bed and extending over edges ensuring drape was under foam where it was atop of skin.  sealed with drape and TRAC pad applied to proximal wound bed at thigh. Sacral mepilex applied superior thigh    Resumed NPWT at 125mmHg,no leaks noted. 2 layer compression wrap applied to left LE.    Interdisciplinary consultation: Patient, Bedside RN (Enma)    EVALUATION / RATIONALE FOR  TREATMENT:    10/19: wound bed has improved in color and is fully granulated.  Addison-wound has improved, denudation has resolved. APRN continuing with serial weekly debridements as needed.   10/16: wound friable pt now on iv abx per ID.  Indurated edges addressed with drape and foam.  Addison wound likely has yeast infection - addressing with silver powder crusting  10/14: patient seen with Asaf ELLER, stopping veraflo instillation.  Starting silver powder and regular wound VAC to see if it will help with bioburden.  Possible colonization of bacteria.  ID involved.   10/12: Inflammation noted to the proximal part of the wound bed.  Will continue to monitor, possible vac break on Wednesday to help addison-skin inflammation.   10/7: Excisional debridement performed by Asaf ELLER. Will need to continue selective debridement with NPWT changes, and weekly excisional debridement with APRN to flatten out the scar tissue.  IV abx therapy ended 10/5.   10/5: Lateral wound still with some slough, both wounds smaller per measurements. Medial wound with all granular tissue.  9/30: Patient to start abx therapy for 7 days course per Dr. Ellis.  Started dakin's instillation to veraflo  9/28: malodorous today, culture taken.    9/25: Odor noted, though unclear if from wound or pt, consider shower before next Vac change. Consider regular vac next change, wound granular and superficial.   9/23: Patient still awaiting guardianship for placement.   9/18: wound granulating nicely and responding well to current treatment.    9/16: Wound bed with granular tissue, edges are thick but appear better than previous assessments. . NPWT VF to encourage closure by secondary intention and manage drainage while encouraging oxygenation and granulation to the wound bed. 2 layer compression to assist in decrease of swelling and encourage blood flow to wounds. Wound team to follow up and change 3x/week.      Goals: Steady decrease in wound  area and depth weekly.    NURSING PLAN OF CARE ORDERS (X):    Dressing changes: See Dressing Care orders: X  Skin care: See Skin Care orders:   Rectal tube care: See Rectal Tube Care orders:   Other orders:      WOUND TEAM PLAN OF CARE:   Dressing changes by wound team:                   Follow up 3 times weekly:                NPWT change 3 times weekly:   X  Follow up 1-2 times weekly:      Follow up Bi-Monthly:                   Follow up as needed:       Other (explain):     Anticipated discharge plans: pending guardianship.   LTACH:        SNF/Rehab: X - patient will need ongoing wound care for LLE upon discharge - 3x/week.                 Home Health Care:           Outpatient Wound Center:            Self Care:

## 2020-10-19 NOTE — CARE PLAN
Problem: Safety  Goal: Will remain free from falls  Outcome: PROGRESSING AS EXPECTED   Treaded socks in place, bed in the lowest position, 1:1 sitter at bedside, call light and belongings within reach, pt call for assistance appropriately   Problem: Infection  Goal: Will remain free from infection  Outcome: PROGRESSING AS EXPECTED     Problem: Discharge Barriers/Planning  Goal: Patient's continuum of care needs will be met  Outcome: PROGRESSING AS EXPECTED   Pending guardianship

## 2020-10-20 PROCEDURE — 700102 HCHG RX REV CODE 250 W/ 637 OVERRIDE(OP): Performed by: NURSE PRACTITIONER

## 2020-10-20 PROCEDURE — A9270 NON-COVERED ITEM OR SERVICE: HCPCS | Performed by: NURSE PRACTITIONER

## 2020-10-20 PROCEDURE — 700101 HCHG RX REV CODE 250: Performed by: NURSE PRACTITIONER

## 2020-10-20 PROCEDURE — 700111 HCHG RX REV CODE 636 W/ 250 OVERRIDE (IP): Performed by: NURSE PRACTITIONER

## 2020-10-20 PROCEDURE — 700105 HCHG RX REV CODE 258: Performed by: NURSE PRACTITIONER

## 2020-10-20 PROCEDURE — 770001 HCHG ROOM/CARE - MED/SURG/GYN PRIV*

## 2020-10-20 RX ADMIN — Medication 400 MG: at 16:45

## 2020-10-20 RX ADMIN — LIDOCAINE 1 PATCH: 50 PATCH CUTANEOUS at 12:25

## 2020-10-20 RX ADMIN — OXYCODONE HYDROCHLORIDE 10 MG: 10 TABLET ORAL at 12:20

## 2020-10-20 RX ADMIN — MEROPENEM 500 MG: 500 INJECTION, POWDER, FOR SOLUTION INTRAVENOUS at 23:30

## 2020-10-20 RX ADMIN — AMLODIPINE BESYLATE 5 MG: 5 TABLET ORAL at 06:06

## 2020-10-20 RX ADMIN — ACETAMINOPHEN 1000 MG: 500 TABLET ORAL at 16:45

## 2020-10-20 RX ADMIN — ACETAMINOPHEN 1000 MG: 500 TABLET ORAL at 12:20

## 2020-10-20 RX ADMIN — MEROPENEM 500 MG: 500 INJECTION, POWDER, FOR SOLUTION INTRAVENOUS at 12:21

## 2020-10-20 RX ADMIN — DIVALPROEX SODIUM 125 MG: 125 CAPSULE ORAL at 12:20

## 2020-10-20 RX ADMIN — OXYCODONE HYDROCHLORIDE 10 MG: 10 TABLET ORAL at 23:30

## 2020-10-20 RX ADMIN — Medication 400 MG: at 06:06

## 2020-10-20 RX ADMIN — RISPERIDONE 1 MG: 1 TABLET ORAL at 16:45

## 2020-10-20 RX ADMIN — MEROPENEM 500 MG: 500 INJECTION, POWDER, FOR SOLUTION INTRAVENOUS at 06:06

## 2020-10-20 RX ADMIN — OXYCODONE 5 MG: 5 TABLET ORAL at 06:06

## 2020-10-20 RX ADMIN — HYDROXYZINE HYDROCHLORIDE 25 MG: 50 TABLET, FILM COATED ORAL at 19:51

## 2020-10-20 RX ADMIN — DIVALPROEX SODIUM 125 MG: 125 CAPSULE ORAL at 06:06

## 2020-10-20 RX ADMIN — MEROPENEM 500 MG: 500 INJECTION, POWDER, FOR SOLUTION INTRAVENOUS at 16:47

## 2020-10-20 RX ADMIN — GABAPENTIN 200 MG: 100 CAPSULE ORAL at 06:06

## 2020-10-20 RX ADMIN — GABAPENTIN 200 MG: 100 CAPSULE ORAL at 12:19

## 2020-10-20 RX ADMIN — GABAPENTIN 200 MG: 100 CAPSULE ORAL at 16:45

## 2020-10-20 RX ADMIN — ACETAMINOPHEN 1000 MG: 500 TABLET ORAL at 06:06

## 2020-10-20 RX ADMIN — RISPERIDONE 0.5 MG: 0.5 TABLET ORAL at 06:06

## 2020-10-20 RX ADMIN — DIVALPROEX SODIUM 125 MG: 125 CAPSULE ORAL at 23:31

## 2020-10-20 RX ADMIN — OXYCODONE HYDROCHLORIDE 10 MG: 10 TABLET ORAL at 19:51

## 2020-10-20 RX ADMIN — MEROPENEM 500 MG: 500 INJECTION, POWDER, FOR SOLUTION INTRAVENOUS at 00:58

## 2020-10-20 RX ADMIN — OXYCODONE HYDROCHLORIDE 10 MG: 10 TABLET ORAL at 16:45

## 2020-10-20 ASSESSMENT — FIBROSIS 4 INDEX: FIB4 SCORE: 0.61

## 2020-10-20 ASSESSMENT — PAIN DESCRIPTION - PAIN TYPE: TYPE: ACUTE PAIN;CHRONIC PAIN

## 2020-10-20 NOTE — CARE PLAN
Problem: Safety  Goal: Will remain free from falls  Outcome: PROGRESSING AS EXPECTED  Note: Patient has safety sitter.     Problem: Infection  Goal: Will remain free from infection  Outcome: PROGRESSING AS EXPECTED

## 2020-10-20 NOTE — CARE PLAN
Problem: Safety  Goal: Will remain free from falls  Outcome: PROGRESSING AS EXPECTED  Note: 1:1 Sitter at bedside.      Problem: Infection  Goal: Will remain free from infection  Outcome: PROGRESSING AS EXPECTED  Note: IV abx administered per MD order.

## 2020-10-20 NOTE — DISCHARGE PLANNING
Anticipated Discharge Disposition: Guardianship,     Action: This RNCM faxed pts ID to Ne Manzanares,  to Teri Madrigal.    Barriers to Discharge: Gaurdianship    Plan: f/u with medical team, Teri Madrigal

## 2020-10-21 PROCEDURE — 97606 NEG PRS WND THER DME>50 SQCM: CPT

## 2020-10-21 PROCEDURE — A9270 NON-COVERED ITEM OR SERVICE: HCPCS | Performed by: NURSE PRACTITIONER

## 2020-10-21 PROCEDURE — 700102 HCHG RX REV CODE 250 W/ 637 OVERRIDE(OP): Performed by: NURSE PRACTITIONER

## 2020-10-21 PROCEDURE — 700101 HCHG RX REV CODE 250: Performed by: NURSE PRACTITIONER

## 2020-10-21 PROCEDURE — 700105 HCHG RX REV CODE 258: Performed by: NURSE PRACTITIONER

## 2020-10-21 PROCEDURE — 770001 HCHG ROOM/CARE - MED/SURG/GYN PRIV*

## 2020-10-21 PROCEDURE — 700111 HCHG RX REV CODE 636 W/ 250 OVERRIDE (IP): Performed by: NURSE PRACTITIONER

## 2020-10-21 PROCEDURE — 99231 SBSQ HOSP IP/OBS SF/LOW 25: CPT | Performed by: NURSE PRACTITIONER

## 2020-10-21 RX ADMIN — MEROPENEM 500 MG: 500 INJECTION, POWDER, FOR SOLUTION INTRAVENOUS at 12:22

## 2020-10-21 RX ADMIN — OXYCODONE HYDROCHLORIDE 10 MG: 10 TABLET ORAL at 23:06

## 2020-10-21 RX ADMIN — DIVALPROEX SODIUM 125 MG: 125 CAPSULE ORAL at 12:21

## 2020-10-21 RX ADMIN — DIVALPROEX SODIUM 125 MG: 125 CAPSULE ORAL at 20:05

## 2020-10-21 RX ADMIN — MEROPENEM 500 MG: 500 INJECTION, POWDER, FOR SOLUTION INTRAVENOUS at 18:31

## 2020-10-21 RX ADMIN — GABAPENTIN 200 MG: 100 CAPSULE ORAL at 16:18

## 2020-10-21 RX ADMIN — ACETAMINOPHEN 1000 MG: 500 TABLET ORAL at 05:29

## 2020-10-21 RX ADMIN — Medication 400 MG: at 16:17

## 2020-10-21 RX ADMIN — OXYCODONE HYDROCHLORIDE 10 MG: 10 TABLET ORAL at 06:29

## 2020-10-21 RX ADMIN — GABAPENTIN 200 MG: 100 CAPSULE ORAL at 12:21

## 2020-10-21 RX ADMIN — ACETAMINOPHEN 1000 MG: 500 TABLET ORAL at 16:17

## 2020-10-21 RX ADMIN — OXYCODONE HYDROCHLORIDE 10 MG: 10 TABLET ORAL at 20:05

## 2020-10-21 RX ADMIN — CYCLOBENZAPRINE 10 MG: 10 TABLET, FILM COATED ORAL at 10:40

## 2020-10-21 RX ADMIN — HYDROXYZINE HYDROCHLORIDE 25 MG: 50 TABLET, FILM COATED ORAL at 16:18

## 2020-10-21 RX ADMIN — MEROPENEM 500 MG: 500 INJECTION, POWDER, FOR SOLUTION INTRAVENOUS at 23:04

## 2020-10-21 RX ADMIN — RISPERIDONE 1 MG: 1 TABLET ORAL at 16:18

## 2020-10-21 RX ADMIN — GABAPENTIN 200 MG: 100 CAPSULE ORAL at 05:29

## 2020-10-21 RX ADMIN — MEROPENEM 500 MG: 500 INJECTION, POWDER, FOR SOLUTION INTRAVENOUS at 05:30

## 2020-10-21 RX ADMIN — OXYCODONE HYDROCHLORIDE 10 MG: 10 TABLET ORAL at 10:40

## 2020-10-21 RX ADMIN — LIDOCAINE 1 PATCH: 50 PATCH CUTANEOUS at 12:23

## 2020-10-21 RX ADMIN — Medication 400 MG: at 05:30

## 2020-10-21 RX ADMIN — RISPERIDONE 0.5 MG: 0.5 TABLET ORAL at 05:30

## 2020-10-21 RX ADMIN — ACETAMINOPHEN 1000 MG: 500 TABLET ORAL at 12:21

## 2020-10-21 RX ADMIN — OXYCODONE HYDROCHLORIDE 10 MG: 10 TABLET ORAL at 03:02

## 2020-10-21 RX ADMIN — DIVALPROEX SODIUM 125 MG: 125 CAPSULE ORAL at 05:29

## 2020-10-21 RX ADMIN — OXYCODONE HYDROCHLORIDE 10 MG: 10 TABLET ORAL at 16:18

## 2020-10-21 RX ADMIN — CYCLOBENZAPRINE 10 MG: 10 TABLET, FILM COATED ORAL at 23:04

## 2020-10-21 RX ADMIN — AMLODIPINE BESYLATE 5 MG: 5 TABLET ORAL at 05:30

## 2020-10-21 ASSESSMENT — ENCOUNTER SYMPTOMS
DIZZINESS: 0
MYALGIAS: 1
WEAKNESS: 1
FEVER: 0
NECK PAIN: 0
CHILLS: 0
HEADACHES: 0
VOMITING: 0
NAUSEA: 0
ABDOMINAL PAIN: 0
PALPITATIONS: 0
SHORTNESS OF BREATH: 0
BACK PAIN: 1
COUGH: 0
FALLS: 0

## 2020-10-21 ASSESSMENT — PAIN DESCRIPTION - PAIN TYPE: TYPE: ACUTE PAIN;CHRONIC PAIN

## 2020-10-21 NOTE — WOUND TEAM
[facility]  Inpatient Services   Wound and Skin Care Evaluation    Admission Date: 8/7/2020     Last order of IP CONSULT TO WOUND CARE was found on 9/1/2020 from Hospital Encounter on 8/7/2020     HPI, PMH, SH: Reviewed    Unit where seen by Wound Team: [id number]     WOUND CONSULT/FOLLOW UP RELATED TO:  NPWT scheduled dressing change to LLE and compression wrap change    Self Report / Pain Level:  Premed with PO. Slowly removed and soaked with NS and lidocaine. Tolerated VERY well.        OBJECTIVE:  NPWT intact, 2 layer intact.     WOUND TYPE, LOCATION, CHARACTERISTICS (Pressure Injuries: location, stage, POA or date identified)  Wound 08/07/20 Full Thickness Wound Leg LLE posterior, extends medially and laterally (Active)   Wound Image        Site Assessment Red;Yellow    Periwound Assessment Clean;Intact;Dry    Margins Attached edges;Defined edges    Closure Secondary intention    Drainage Amount Small    Drainage Description Serosanguineous    Treatments Cleansed;Offloading;Site care;Compression    Wound Cleansing Approved Wound Cleanser    Periwound Protectant Benzoin;Drape    Dressing Cleansing/Solutions Not Applicable    Dressing Options Wound Vac;Compression Wrap Two Layer    Dressing Changed Changed    Dressing Status Clean;Dry;Intact    Dressing Change/Treatment Frequency By Wound Team Only    NEXT Dressing Change/Treatment Date 10/23/20    NEXT Weekly Photo (Inpatient Only) 10/28/20    Non-staged Wound Description Full thickness    Wound Length (cm) 30 cm    Wound Width (cm) 14 cm    Wound Depth (cm) 0.2 cm    Wound Surface Area (cm^2) 420 cm^2    Wound Volume (cm^3) 84 cm^3    Wound Healing % -60    Wound Odor Mild    Exposed Structures None    WOUND NURSE ONLY - Time Spent with Patient (mins) 60    Number of days: 75      Negative Pressure Wound Therapy 09/03/20 Leg Lower;Medial;Posterior Left (Active)   NPWT Pump Mode / Pressure Setting Ulta;Continuous;125 mmHg    Dressing Type  Medium;Black Foam (Regular)    Number of Foam Pieces Used 6    Canister Changed No    NEXT Dressing Change/Treatment Date 10/23/20    VAC VeraFlo Pressure (mm/Hg) Continuous;125 mmHg    WOUND NURSE ONLY - Time Spent with Patient (mins) 90                     Vascular:    KOSTAS:   No results found.    Lab Values:    Lab Results   Component Value Date/Time    WBC 6.0 10/14/2020 11:43 AM    RBC 3.87 (L) 10/14/2020 11:43 AM    HEMOGLOBIN 11.0 (L) 10/14/2020 11:43 AM    HEMATOCRIT 33.9 (L) 10/14/2020 11:43 AM    CREACTPROT 1.07 (H) 08/12/2020 01:55 PM    SEDRATEWES 85 (H) 08/07/2020 01:20 PM    HBA1C 5.7 (H) 11/09/2018 06:30 PM        Culture Results show:  Recent Results (from the past 720 hour(s))   CULTURE WOUND W/ GRAM STAIN    Collection Time: 09/28/20  9:50 AM    Specimen: Left Leg; Wound   Result Value Ref Range    Significant Indicator POS (POS)     Source WND     Site LEFT LEG     Culture Result Rare growth mixed skin louts. (A)     Gram Stain Result Rare WBCs.  Rare Gram positive cocci.       Culture Result (A)      Beta Hemolytic Streptococcus group A  Moderate growth      Culture Result Proteus vulgaris  Light growth   (A)        Susceptibility    Proteus vulgaris - CATHERINE     Ampicillin >16 Resistant mcg/mL     Ceftriaxone 32 Resistant mcg/mL     Ceftazidime 16 Intermediate mcg/mL     Cefotaxime 16 Intermediate mcg/mL     Cefazolin >16 Resistant mcg/mL     Ciprofloxacin >2 Resistant mcg/mL     Ampicillin/sulbactam >16/8 Resistant mcg/mL     Cefepime <=2 Sensitive mcg/mL     Tobramycin <=4 Sensitive mcg/mL     Cefotetan <=16 Sensitive mcg/mL     Ertapenem <=0.5 Sensitive mcg/mL     Gentamicin >8 Resistant mcg/mL     Pip/Tazobactam <=16 Sensitive mcg/mL     Trimeth/Sulfa >2/38 Resistant mcg/mL       INTERVENTIONS BY WOUND TEAM:  Patient and chart reviewed. This wound care RN had patient take off his pants and lay on his stomach for dressing change.  Dressings removed and wound and addison wound cleansed with NS and  wound cleanser, patted dry with gauze.  Meredith-wound prepped with no sting skin prep crusted with silver powder and draped meredith wound so foam could be placed over protected edges. Argales powder to wound bed, black foam to wound bed and extending over edges ensuring drape was under foam where it was atop of skin.  sealed with drape and button and TRAC pad applied to proximal wound bed at thigh. Had to add small piece to very distal end of wound, secured with drape. Sacral mepilex applied superior thigh  Resumed NPWT at 125mmHg,no leaks noted. 2 layer compression wrap applied to left LE. Reapplied pants.     Interdisciplinary consultation: Patient, Bedside RN (Althea), JOMAR Pathak (sitting)    EVALUATION / RATIONALE FOR TREATMENT: 10/21: Wound bed continuing to improve in color and granulation. No denuded areas to periwound. Less pain. Will continue treatment 3x/week.    10/19: wound bed has improved in color and is fully granulated.  Meredith-wound has improved, denudation has resolved. APRN continuing with serial weekly debridements as needed.   10/16: wound friable pt now on iv abx per ID.  Indurated edges addressed with drape and foam.  Meredith wound likely has yeast infection - addressing with silver powder crusting  10/14: patient seen with Asaf ELLER, stopping veraflo instillation.  Starting silver powder and regular wound VAC to see if it will help with bioburden.  Possible colonization of bacteria.  ID involved.   10/12: Inflammation noted to the proximal part of the wound bed.  Will continue to monitor, possible vac break on Wednesday to help meredith-skin inflammation.   10/7: Excisional debridement performed by Asaf ELLER. Will need to continue selective debridement with NPWT changes, and weekly excisional debridement with APRN to flatten out the scar tissue.  IV abx therapy ended 10/5.   10/5: Lateral wound still with some slough, both wounds smaller per measurements. Medial wound with all granular  tissue.  9/30: Patient to start abx therapy for 7 days course per Dr. Ellis.  Started dakin's instillation to veraflo  9/28: malodorous today, culture taken.    9/25: Odor noted, though unclear if from wound or pt, consider shower before next Vac change. Consider regular vac next change, wound granular and superficial.   9/23: Patient still awaiting guardianship for placement.   9/18: wound granulating nicely and responding well to current treatment.    9/16: Wound bed with granular tissue, edges are thick but appear better than previous assessments. . NPWT VF to encourage closure by secondary intention and manage drainage while encouraging oxygenation and granulation to the wound bed. 2 layer compression to assist in decrease of swelling and encourage blood flow to wounds. Wound team to follow up and change 3x/week.           Goals: Steady decrease in wound area and depth weekly.    NURSING PLAN OF CARE ORDERS (X):    Dressing changes: See Dressing Care orders: X  Skin care: See Skin Care orders:   RN Prevention Protocol:   Rectal tube care: See Rectal Tube Care orders:   Other orders:       WOUND TEAM PLAN OF CARE:   Dressing changes by wound team:                   Follow up 3 times weekly:                NPWT change 3 times weekly:  X   Follow up 1-2 times weekly:      Follow up Bi-Monthly:                   Follow up as needed:       Other (explain):     Anticipated discharge plans:   LTACH:        SNF/Rehab:   X - patient will need ongoing wound care for LLE upon discharge - 3x/week               Home Health Care:           Outpatient Wound Center:            Self Care:

## 2020-10-21 NOTE — PROGRESS NOTES
Hospital Medicine Twice Weekly Progress Note    Date of Service  10/21/2020    Chief Complaint  LLE Wound    Hospital Course   Mr. Varela is a 64-year-old male with PMH significant for homelessness, etoh use, tobacco dependence and chronic LLE wound who presented 8/7/2020 with LLE wound infection.  He was hospitalized at our facility in April and evaluated by orthopedic surgery who recommended wound care.  Wound cultures at that time grew MSSA and strep. Patient reported losing his Medicaid card and having no transportation to wound clinic.  He was seen at [facility] earlier this week and prescribed ABX, however he has not filled the prescription.  US LLE negative for DVT.  Treated for sepsis with empiric ABX and wound care. He completed ABX course 9/16. He was found to have head lice and underwent treatments. Continued to have intermittent agitation so was started on risperdol, depakote and gabapentin per psych recs.  He has been deemed incapacitated to make medical decisions and is currently pending guardianship and placement.  He is medically stable and will be only be evaluated 2 times weekly unless there is a clinical change. Repeat wound culture 9/28 grew Strep A and proteus. ID was consulted and recommended 5-day IV ABX with Zosyn - completed ABX 10/5. Wound care team noticed increased inflammation to the proximal part of the wound bed on 10/12.  They switched from vera flow wound VAC to regular wound VAC.  ID was consulted again on 10/14 and recommended 7-day course ABX with meropenem with anticipated stop date 10/22.        Interval Problem Update  10/21: Reports LLE pain > RLE. Reports mild improvement in pain control with Lidoderm patch. He ambulated to bathroom independently with strong and steady gait. Wound vac with serosanguinous output. VSS on room air. Afebrile. Continue Meropenum until 10/21 per ID recommendations.    Consultants/Specialty  Infectious Disease  Psychiatry    Code  Status  Full Code    Disposition  Pending guardianship and placement - likely Group Home     Review of Systems  Review of Systems   Constitutional: Positive for malaise/fatigue. Negative for chills and fever.   HENT: Negative.    Respiratory: Negative for cough (chronic) and shortness of breath.    Cardiovascular: Negative for chest pain and palpitations.   Gastrointestinal: Negative for abdominal pain, nausea and vomiting.   Musculoskeletal: Positive for back pain and myalgias. Negative for falls, joint pain and neck pain.        LLE pain around wound   Skin:        LLE wound hurts more today   Neurological: Positive for weakness. Negative for dizziness and headaches.        Physical Exam  Temp:  [36.1 °C (96.9 °F)-36.8 °C (98.2 °F)] 36.8 °C (98.2 °F)  Pulse:  [62-90] 90  Resp:  [17-18] 18  BP: (116-133)/(77-89) 128/89  SpO2:  [94 %-97 %] 95 %    Physical Exam  Vitals signs and nursing note reviewed. Exam conducted with a chaperone present (1:1 sitter ).   Constitutional:       General: He is awake. He is not in acute distress.     Appearance: He is underweight. He is not toxic-appearing or diaphoretic.      Comments: Chronically ill appearing    HENT:      Head: Normocephalic and atraumatic.      Nose: Nose normal.      Mouth/Throat:      Lips: Pink.      Mouth: Mucous membranes are moist.   Eyes:      General: No scleral icterus.     Conjunctiva/sclera: Conjunctivae normal.   Neck:      Musculoskeletal: Normal range of motion.   Cardiovascular:      Pulses: Normal pulses.   Pulmonary:      Effort: Pulmonary effort is normal.   Abdominal:      General: There is no distension.      Tenderness: There is no abdominal tenderness.   Musculoskeletal:      Left upper leg: He exhibits tenderness. He exhibits no swelling and no edema.      Right lower leg: No edema.      Left lower leg: No edema.      Comments: Ambulates independently with steady gait.   Skin:     General: Skin is warm and dry.      Comments: Left leg  wound - Veriflow wound vac in place, functioning appropriately    Neurological:      Mental Status: He is alert.      Coordination: Coordination is intact.      Gait: Gait is intact. Gait normal.   Psychiatric:         Mood and Affect: Mood normal.         Speech: Speech normal.         Behavior: Behavior is not aggressive or combative. Behavior is cooperative.         Cognition and Memory: Cognition is impaired.         Judgment: Judgment is impulsive.      Comments:            Fluids    Intake/Output Summary (Last 24 hours) at 10/21/2020 0955  Last data filed at 10/21/2020 0816  Gross per 24 hour   Intake 6496.67 ml   Output --   Net 6496.67 ml       Laboratory                        Imaging  IR-MIDLINE CATHETER INSERTION WO GUIDANCE > AGE 3   Final Result                  Ultrasound-guided midline placement performed by qualified nursing staff    as above.          IR-US GUIDED PIV   Final Result    Ultrasound-guided PERIPHERAL IV INSERTION performed by    qualified nursing staff as above.      IR-MIDLINE CATHETER INSERTION WO GUIDANCE > AGE 3   Final Result                  Ultrasound-guided midline placement performed by qualified nursing staff    as above.          US-KOSTAS SINGLE LEVEL BILAT   Final Result      CT-HEAD W/O   Final Result      No acute intracranial abnormality      DX-TIBIA AND FIBULA LEFT   Final Result      1.  Healed distal metaphyseal fractures of the left fibula and tibia with tibial IM layla in place.      2.  No acute fracture or dislocation.      3.  No radiopaque soft tissue foreign body.      DX-FEMUR-2+ LEFT   Final Result      1.  No radiographic evidence of acute traumatic injury left femur.      2.  Unchanged cortical thickening in the posterior aspect of the distal femoral diaphysis which may represent sequela of prior injury or infection.      3.  No soft tissue foreign body identified.      US-EXTREMITY VENOUS LOWER UNILAT LEFT   Final Result      DX-CHEST-PORTABLE (1 VIEW)    Final Result      No acute cardiopulmonary abnormality.           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   -blood cultures (-)   -LPS following for serial debridements  -Continue wound vac per wound care   -Cx 9/1 Strep, MRSA, diptheroids: completed Zyvox 9/2 thru 9/16  -cx 9/28 strep A and proteus - ID consulted and recommended course of IV zosyn which completed on 10/5.  -On 7-day course Meropenum per ID recommendations, stop date 10/21     Encephalopathy- (present on admission)  Assessment & Plan  -acute on chronic, likely due Wernicke's   -Psychiatry: incapacitated to make medical decision or leave AMA   -Requires guardianship and placement  -Avoid narcotics and hypnotics.  Cautious use of benzodiazepines for alcohol withdrawal protocol  -Frequent reorientation  -Sleep hygiene    Non-compliance  Assessment & Plan  -likely also component of psychiatric disorder and ETOH abuse  -Psychiatric consulted and suggests patient is incapacitated to make medical decisions or leave AMA  -ongoing encouragement for compliance.    Psychiatric disorder  Assessment & Plan  -unknown/unspecified  -psychiatry consulted and deemed him incapacitated to leave AMA or make medical decisions  -continue risperdol 0.5 -1mg BID, cont depakote 125mg TID added neurotin 100mg TID 8/16  -Pending guardianship. Application submitted 9/3.    Flexion contracture of knee, 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)  Assessment & Plan  -secondary to chronic wound in that area  -PT OT  -encourage mobility    Acute pain  Assessment & Plan  -multifactorial: acute on chronic back pain without sciatica. No bony tenderness, no deformities. No recent trauma. Ambulatory independently at his baseline with steady gait. Main complaint is muscle spasms.  -Continue oxycodone, flexeril, gabapentin and Lidoderm. Wean  as tolerated   -hold all narcotics and muscle relaxer's for RR < 12 or altered mentation  -Encourage mobility  -heat packs      Emphysema/COPD (HCC)  Assessment & Plan  -chronic and stable without acute exacerbation    Alcohol dependence (HCC)- (present on admission)  Assessment & Plan  -history of   -abstinent since admission    Essential hypertension- (present on admission)  Assessment & Plan  Controlled on Norvasc   Monitor VS per protocol     Protein malnutrition (HCC)  Assessment & Plan  -history of ETOH and homelessness  -moderate/severe  -Body mass index is 20.33 kg/m².  -TID supplements  -encourage PO intake    Tobacco dependence- (present on admission)  Assessment & Plan  -abstinent since admission       VTE prophylaxis: Ambulatory    I have performed a physical exam and reviewed and updated ROS and Plan today (10/21/2020). In review of previous note, there are no changes except as documented above.       FELIPE Aden      I certify the patient requires continued medically necessary hospital services for the treatment of non-healing wound.  The patient will remain in the hospital for the foreseeable future.  Discharge may or may not occur in the next 20 days due to ongoing discharge delays due to having no medically acceptable discharge options.

## 2020-10-21 NOTE — CARE PLAN
Problem: Safety  Goal: Will remain free from falls  Outcome: PROGRESSING AS EXPECTED     Problem: Pain Management  Goal: Pain level will decrease to patient's comfort goal  Outcome: PROGRESSING AS EXPECTED  Note: PRN Oxycodone

## 2020-10-22 PROCEDURE — 700102 HCHG RX REV CODE 250 W/ 637 OVERRIDE(OP): Performed by: NURSE PRACTITIONER

## 2020-10-22 PROCEDURE — 700105 HCHG RX REV CODE 258

## 2020-10-22 PROCEDURE — A9270 NON-COVERED ITEM OR SERVICE: HCPCS | Performed by: NURSE PRACTITIONER

## 2020-10-22 PROCEDURE — 700105 HCHG RX REV CODE 258: Performed by: NURSE PRACTITIONER

## 2020-10-22 PROCEDURE — 770001 HCHG ROOM/CARE - MED/SURG/GYN PRIV*

## 2020-10-22 PROCEDURE — 700111 HCHG RX REV CODE 636 W/ 250 OVERRIDE (IP): Performed by: NURSE PRACTITIONER

## 2020-10-22 PROCEDURE — 700101 HCHG RX REV CODE 250: Performed by: NURSE PRACTITIONER

## 2020-10-22 RX ORDER — SODIUM CHLORIDE 9 MG/ML
INJECTION, SOLUTION INTRAVENOUS
Status: COMPLETED
Start: 2020-10-22 | End: 2020-10-22

## 2020-10-22 RX ADMIN — HYDROXYZINE HYDROCHLORIDE 25 MG: 50 TABLET, FILM COATED ORAL at 13:50

## 2020-10-22 RX ADMIN — RISPERIDONE 1 MG: 1 TABLET ORAL at 17:15

## 2020-10-22 RX ADMIN — OXYCODONE HYDROCHLORIDE 10 MG: 10 TABLET ORAL at 12:32

## 2020-10-22 RX ADMIN — RISPERIDONE 0.5 MG: 0.5 TABLET ORAL at 04:35

## 2020-10-22 RX ADMIN — GABAPENTIN 200 MG: 100 CAPSULE ORAL at 04:35

## 2020-10-22 RX ADMIN — OXYCODONE HYDROCHLORIDE 10 MG: 10 TABLET ORAL at 08:44

## 2020-10-22 RX ADMIN — Medication 400 MG: at 17:15

## 2020-10-22 RX ADMIN — LIDOCAINE 1 PATCH: 50 PATCH CUTANEOUS at 12:32

## 2020-10-22 RX ADMIN — DIVALPROEX SODIUM 125 MG: 125 CAPSULE ORAL at 04:35

## 2020-10-22 RX ADMIN — SODIUM CHLORIDE: 9 INJECTION, SOLUTION INTRAVENOUS at 04:35

## 2020-10-22 RX ADMIN — OXYCODONE HYDROCHLORIDE 10 MG: 10 TABLET ORAL at 04:35

## 2020-10-22 RX ADMIN — ACETAMINOPHEN 1000 MG: 500 TABLET ORAL at 04:35

## 2020-10-22 RX ADMIN — GABAPENTIN 200 MG: 100 CAPSULE ORAL at 12:32

## 2020-10-22 RX ADMIN — OXYCODONE HYDROCHLORIDE 10 MG: 10 TABLET ORAL at 17:15

## 2020-10-22 RX ADMIN — Medication 400 MG: at 04:35

## 2020-10-22 RX ADMIN — GABAPENTIN 200 MG: 100 CAPSULE ORAL at 17:15

## 2020-10-22 RX ADMIN — DIVALPROEX SODIUM 125 MG: 125 CAPSULE ORAL at 13:50

## 2020-10-22 RX ADMIN — DIVALPROEX SODIUM 125 MG: 125 CAPSULE ORAL at 20:34

## 2020-10-22 RX ADMIN — MEROPENEM 500 MG: 500 INJECTION, POWDER, FOR SOLUTION INTRAVENOUS at 04:35

## 2020-10-22 RX ADMIN — AMLODIPINE BESYLATE 5 MG: 5 TABLET ORAL at 04:35

## 2020-10-22 RX ADMIN — OXYCODONE HYDROCHLORIDE 10 MG: 10 TABLET ORAL at 20:34

## 2020-10-22 ASSESSMENT — PAIN DESCRIPTION - PAIN TYPE: TYPE: ACUTE PAIN;CHRONIC PAIN

## 2020-10-22 NOTE — CARE PLAN
Problem: Safety  Goal: Will remain free from falls  Outcome: PROGRESSING AS EXPECTED  Note: 1:1 safety sitter at bedside. Bed locked and in lowest position. Call light and belongings within reach.     Problem: Infection  Goal: Will remain free from infection  Outcome: PROGRESSING AS EXPECTED  Note: Pt educated on hand hygiene. Pt afebrile.

## 2020-10-23 LAB
PARASITE SPEC INSPECT: ABNORMAL
SIGNIFICANT IND 70042: ABNORMAL
SITE SITE: ABNORMAL
SOURCE SOURCE: ABNORMAL

## 2020-10-23 PROCEDURE — 700102 HCHG RX REV CODE 250 W/ 637 OVERRIDE(OP): Performed by: NURSE PRACTITIONER

## 2020-10-23 PROCEDURE — 87169 MACROSCOPIC EXAM PARASITE: CPT

## 2020-10-23 PROCEDURE — A9270 NON-COVERED ITEM OR SERVICE: HCPCS | Performed by: NURSE PRACTITIONER

## 2020-10-23 PROCEDURE — 29581 APPL MULTLAYER CMPRN SYS LEG: CPT

## 2020-10-23 PROCEDURE — 700101 HCHG RX REV CODE 250: Performed by: NURSE PRACTITIONER

## 2020-10-23 PROCEDURE — 770021 HCHG ROOM/CARE - ISO PRIVATE

## 2020-10-23 RX ADMIN — CYCLOBENZAPRINE 10 MG: 10 TABLET, FILM COATED ORAL at 01:43

## 2020-10-23 RX ADMIN — RISPERIDONE 1 MG: 1 TABLET ORAL at 17:49

## 2020-10-23 RX ADMIN — DIVALPROEX SODIUM 125 MG: 125 CAPSULE ORAL at 14:09

## 2020-10-23 RX ADMIN — OXYCODONE HYDROCHLORIDE 10 MG: 10 TABLET ORAL at 21:28

## 2020-10-23 RX ADMIN — OXYCODONE HYDROCHLORIDE 10 MG: 10 TABLET ORAL at 17:53

## 2020-10-23 RX ADMIN — Medication 400 MG: at 06:33

## 2020-10-23 RX ADMIN — PIPERONYL BUTOXIDE, PYRETHRUM EXTRACT: 4; .33 SHAMPOO TOPICAL at 12:32

## 2020-10-23 RX ADMIN — Medication 400 MG: at 17:49

## 2020-10-23 RX ADMIN — GABAPENTIN 200 MG: 100 CAPSULE ORAL at 06:32

## 2020-10-23 RX ADMIN — OXYCODONE HYDROCHLORIDE 10 MG: 10 TABLET ORAL at 02:50

## 2020-10-23 RX ADMIN — DIVALPROEX SODIUM 125 MG: 125 CAPSULE ORAL at 21:28

## 2020-10-23 RX ADMIN — AMLODIPINE BESYLATE 5 MG: 5 TABLET ORAL at 06:33

## 2020-10-23 RX ADMIN — OXYCODONE HYDROCHLORIDE 10 MG: 10 TABLET ORAL at 14:09

## 2020-10-23 RX ADMIN — OXYCODONE HYDROCHLORIDE 10 MG: 10 TABLET ORAL at 09:46

## 2020-10-23 RX ADMIN — HYDROXYZINE HYDROCHLORIDE 25 MG: 50 TABLET, FILM COATED ORAL at 21:29

## 2020-10-23 RX ADMIN — LIDOCAINE 1 PATCH: 50 PATCH CUTANEOUS at 12:32

## 2020-10-23 RX ADMIN — RISPERIDONE 0.5 MG: 0.5 TABLET ORAL at 06:33

## 2020-10-23 RX ADMIN — OXYCODONE HYDROCHLORIDE 10 MG: 10 TABLET ORAL at 06:30

## 2020-10-23 RX ADMIN — GABAPENTIN 200 MG: 100 CAPSULE ORAL at 12:30

## 2020-10-23 RX ADMIN — GABAPENTIN 200 MG: 100 CAPSULE ORAL at 17:49

## 2020-10-23 RX ADMIN — OXYCODONE HYDROCHLORIDE 10 MG: 10 TABLET ORAL at 00:01

## 2020-10-23 ASSESSMENT — PAIN DESCRIPTION - PAIN TYPE
TYPE: CHRONIC PAIN;ACUTE PAIN
TYPE: ACUTE PAIN;CHRONIC PAIN
TYPE: CHRONIC PAIN;ACUTE PAIN
TYPE: CHRONIC PAIN

## 2020-10-23 NOTE — DISCHARGE PLANNING
Anticipated Discharge Disposition: Group Home    Action: Received 2 certified envelopes for pt and for CM leadership Anai. pts given to pt, the other placed in pts chart as instructed by Georgie Mensah CM Supervisor.    Barriers to Discharge: Laurence, Group Home placement    Plan: f/u with medical team, pt, Teri Madrigal

## 2020-10-23 NOTE — CARE PLAN
Problem: Safety  Goal: Will remain free from falls  Outcome: PROGRESSING AS EXPECTED  Note: 1:1 safety sitter at bedside. Bed locked and in lowest position. Call light and belonging within reach.     Problem: Mobility  Goal: Risk for activity intolerance will decrease  Outcome: PROGRESSING AS EXPECTED  Note: Pt up ad erika. Pt ambulates frequently in room.

## 2020-10-23 NOTE — PROGRESS NOTES
Lice killing shampoo given as directed. Pt agreed to having all hair shaved off. Pt showered with assistance of CNA. Linens changed.

## 2020-10-24 PROCEDURE — 700101 HCHG RX REV CODE 250: Performed by: NURSE PRACTITIONER

## 2020-10-24 PROCEDURE — 700111 HCHG RX REV CODE 636 W/ 250 OVERRIDE (IP): Performed by: NURSE PRACTITIONER

## 2020-10-24 PROCEDURE — 700102 HCHG RX REV CODE 250 W/ 637 OVERRIDE(OP): Performed by: NURSE PRACTITIONER

## 2020-10-24 PROCEDURE — A9270 NON-COVERED ITEM OR SERVICE: HCPCS | Performed by: NURSE PRACTITIONER

## 2020-10-24 PROCEDURE — 99231 SBSQ HOSP IP/OBS SF/LOW 25: CPT | Performed by: NURSE PRACTITIONER

## 2020-10-24 PROCEDURE — 770021 HCHG ROOM/CARE - ISO PRIVATE

## 2020-10-24 RX ORDER — KETOROLAC TROMETHAMINE 30 MG/ML
15 INJECTION, SOLUTION INTRAMUSCULAR; INTRAVENOUS EVERY 6 HOURS PRN
Status: DISPENSED | OUTPATIENT
Start: 2020-10-24 | End: 2020-10-29

## 2020-10-24 RX ORDER — OXYCODONE HYDROCHLORIDE 10 MG/1
10-15 TABLET ORAL EVERY 6 HOURS PRN
Status: DISCONTINUED | OUTPATIENT
Start: 2020-10-24 | End: 2020-10-28

## 2020-10-24 RX ADMIN — RISPERIDONE 1 MG: 1 TABLET ORAL at 16:48

## 2020-10-24 RX ADMIN — DIVALPROEX SODIUM 125 MG: 125 CAPSULE ORAL at 04:18

## 2020-10-24 RX ADMIN — RISPERIDONE 0.5 MG: 0.5 TABLET ORAL at 04:18

## 2020-10-24 RX ADMIN — OXYCODONE HYDROCHLORIDE 15 MG: 10 TABLET ORAL at 16:47

## 2020-10-24 RX ADMIN — KETOROLAC TROMETHAMINE 15 MG: 30 INJECTION, SOLUTION INTRAMUSCULAR; INTRAVENOUS at 20:07

## 2020-10-24 RX ADMIN — HYDROXYZINE HYDROCHLORIDE 25 MG: 50 TABLET, FILM COATED ORAL at 13:01

## 2020-10-24 RX ADMIN — Medication 400 MG: at 04:18

## 2020-10-24 RX ADMIN — GABAPENTIN 200 MG: 100 CAPSULE ORAL at 04:18

## 2020-10-24 RX ADMIN — Medication 400 MG: at 16:47

## 2020-10-24 RX ADMIN — OXYCODONE HYDROCHLORIDE 15 MG: 10 TABLET ORAL at 10:54

## 2020-10-24 RX ADMIN — DIVALPROEX SODIUM 125 MG: 125 CAPSULE ORAL at 13:01

## 2020-10-24 RX ADMIN — GABAPENTIN 200 MG: 100 CAPSULE ORAL at 10:55

## 2020-10-24 RX ADMIN — GABAPENTIN 200 MG: 100 CAPSULE ORAL at 16:46

## 2020-10-24 RX ADMIN — OXYCODONE HYDROCHLORIDE 10 MG: 10 TABLET ORAL at 04:18

## 2020-10-24 RX ADMIN — CYCLOBENZAPRINE 10 MG: 10 TABLET, FILM COATED ORAL at 20:08

## 2020-10-24 RX ADMIN — LIDOCAINE 1 PATCH: 50 PATCH CUTANEOUS at 10:55

## 2020-10-24 RX ADMIN — DIVALPROEX SODIUM 125 MG: 125 CAPSULE ORAL at 20:08

## 2020-10-24 RX ADMIN — AMLODIPINE BESYLATE 5 MG: 5 TABLET ORAL at 04:18

## 2020-10-24 RX ADMIN — CYCLOBENZAPRINE 10 MG: 10 TABLET, FILM COATED ORAL at 13:02

## 2020-10-24 ASSESSMENT — PAIN DESCRIPTION - PAIN TYPE
TYPE: ACUTE PAIN;CHRONIC PAIN
TYPE: ACUTE PAIN;CHRONIC PAIN

## 2020-10-24 ASSESSMENT — ENCOUNTER SYMPTOMS
CHILLS: 0
PALPITATIONS: 0
MYALGIAS: 1
DIZZINESS: 0
SHORTNESS OF BREATH: 0
NAUSEA: 0
VOMITING: 0
HEADACHES: 0
WEAKNESS: 1
FALLS: 0
NECK PAIN: 0
FEVER: 0
ABDOMINAL PAIN: 0
COUGH: 0
BACK PAIN: 0

## 2020-10-24 ASSESSMENT — PAIN SCALES - WONG BAKER: WONGBAKER_NUMERICALRESPONSE: DOESN'T HURT AT ALL

## 2020-10-24 NOTE — WOUND TEAM
[facility]  Inpatient Services   Wound and Skin Care Progress Note    Admission Date: 8/7/2020     Last order of IP CONSULT TO WOUND CARE was found on 9/1/2020 from Hospital Encounter on 8/7/2020     HPI, PMH, SH: Reviewed    Unit where seen by Wound Team: [id number]     WOUND CONSULT/FOLLOW UP RELATED TO:  Left leg   Self Report / Pain Level: none reported    OBJECTIVE:  Vac dressing in place, 2 layer     WOUND TYPE, LOCATION, CHARACTERISTICS (Pressure Injuries: location, stage, POA or date identified)               Wound 08/07/20 Full Thickness Wound Leg LLE posterior, extends medially and laterally (Active)   Wound Image     10/21/20 1100   Site Assessment RONNI 10/23/20 2045   Periwound Assessment RONNI 10/23/20 2045   Margins RONNI 10/23/20 2045   Closure RONNI 10/23/20 2045   Drainage Amount Small 10/23/20 1800   Drainage Description Serosanguineous 10/23/20 1800   Treatments Site care;Cleansed 10/23/20 1800   Wound Cleansing Approved Wound Cleanser 10/23/20 1800   Periwound Protectant Skin Protectant Wipes to Periwound 10/23/20 1800   Dressing Cleansing/Solutions Not Applicable 10/21/20 1100   Dressing Options Collagen Dressing;Hydrofera Blue Ready;Absorbent Abdominal Pad;Mepilex;Compression Wrap Two Layer 10/23/20 1800   Dressing Changed Changed 10/23/20 1800   Dressing Status Clean;Dry;Intact 10/23/20 0715   Dressing Change/Treatment Frequency Every 72 hrs, and As Needed 10/23/20 1800   NEXT Dressing Change/Treatment Date 10/26/20 10/23/20 1800   NEXT Weekly Photo (Inpatient Only) 10/27/20 10/23/20 1800   Non-staged Wound Description Full thickness 10/23/20 1800   Wound Length (cm) 30 cm 10/21/20 1100   Wound Width (cm) 14 cm 10/21/20 1100   Wound Depth (cm) 0.2 cm 10/21/20 1100   Wound Surface Area (cm^2) 420 cm^2 10/21/20 1100   Wound Volume (cm^3) 84 cm^3 10/21/20 1100   Post-Procedure Length (cm) 30 cm 10/14/20 1100   Post-Procedure Width (cm) 15 cm 10/14/20 1100   Post-Procedure Depth (cm) 0.3 cm  10/14/20 1100   Post-Procedure Surface Area (cm^2) 450 cm^2 10/14/20 1100   Post-Procedure Volume (cm^3) 135 cm^3 10/14/20 1100   Wound Healing % -60 10/21/20 1100   Wound Bed Granulation (%) 80 % 10/23/20 1800   Wound Bed Epithelium (%) 0 % 20 1300   Wound Bed Slough (%) 20 % 10/23/20 1800   Wound Bed Granulation (%) - Post-Procedure 100 % 10/14/20 1100   Wound Bed Epithelium % - (Post-Procedure) 0 % 20 1300   Wound Bed Slough % - (Post-Procedure) 0 % 10/07/20 1100   Wound Bed Eschar % - (Post-Procedure) 0 % 20 1300   Tunneling (cm) 0 cm 20 1300   Undermining (cm) 0 cm 20 1300   Shape irregular 10/21/20 1100   Wound Odor None 10/23/20 1800   Pulses Left;2+;DP;PT 10/14/20 1100   Exposed Structures None 10/23/20 1800   WOUND NURSE ONLY - Time Spent with Patient (mins) 60 10/23/20 1800            VASCULAR    CESAR:   CESAR Results, Last 30 Days Us-cesar Single Level Bilat    Result Date: 2020  Narrative  Vascular Laboratory  Conclusions  1.  Normal bilateral CESAR's.  GRUPO GANN  Age:    64    Gender:     M  MRN:    [id number]  :    1955      BSA:  Exam Date:     2020 09:59  Room #:     Inpatient  Priority:     Routine  Ht (in):             Wt (lb):  Ordering Physician:        EDDA CANADA  Referring Physician:       [id number] Morrissey  Sonographer:               Deja Ellis [facility]                             RVT  Study Type:                Complete Bilateral  Technical Quality:         Adequate  Indications:     Ulceration of LE  CPT Codes:       00820  ICD Codes:       707.1  History:         Nonhealing wound of left lower extremity.  Limitations:                 RIGHT  Waveform            Systolic BPs (mmHg)                             117           Brachial  Triphasic                                Common Femoral  Triphasic                  138           Posterior Tibial  Triphasic                   132           Dorsalis Pedis                                           Peroneal                             1.18          KOSTAS                                           TBI                       LEFT  Waveform        Systolic BPs (mmHg)                             114           Brachial  Triphasic                                Common Femoral  Triphasic                  127           Posterior Tibial  Triphasic                  122           Dorsalis Pedis                                           Peroneal                             1.09          KOSTAS                                           TBI  Findings  Right.  Doppler waveforms of the common femoral artery are of high amplitude and  triphasic.  Doppler waveforms at the ankle are brisk and triphasic.  Ankle-brachial index is normal.  Left.  Doppler waveforms of the common femoral artery are of high amplitude and  triphasic.  Doppler waveforms at the ankle are brisk and triphasic.  Ankle-brachial index is normal.  Unable to obtained popliteal waveforms due to wound bandages.  Additional testing was not performed in accordance with lower extremity  arterial evaluation protocol.  Clover Maya  (Electronically Signed)  Final Date:      02 September 2020                   11:14    Lab Values:    Lab Results   Component Value Date/Time    WBC 6.0 10/14/2020 11:43 AM    RBC 3.87 (L) 10/14/2020 11:43 AM    HEMOGLOBIN 11.0 (L) 10/14/2020 11:43 AM    HEMATOCRIT 33.9 (L) 10/14/2020 11:43 AM    CREACTPROT 1.07 (H) 08/12/2020 01:55 PM    SEDRATEWES 85 (H) 08/07/2020 01:20 PM    HBA1C 5.7 (H) 11/09/2018 06:30 PM        Culture Results show:  Recent Results (from the past 720 hour(s))   CULTURE WOUND W/ GRAM STAIN    Collection Time: 09/01/20  2:00 PM    Specimen: Left Leg; Wound   Result Value Ref Range    Significant Indicator POS (POS)     Source WND     Site LEFT LEG     Culture Result - (A)     Gram Stain Result       Moderate Gram positive cocci.  Moderate Gram positive  rods.      Culture Result (A)      Beta Hemolytic Streptococcus group A  Moderate growth      Culture Result (A)      Methicillin Resistant Staphylococcus aureus  Moderate growth      Culture Result (A)      Diphtheroids  Moderate growth  Mixed morphologies.         INTERVENTIONS BY WOUND TEAM:    Dressings removed and wound and addison wound cleansed with NS and wound cleanser, patted dry with gauze.  Collagen dressing onto wound beds allowing pt's wound drainage to activate product, covered with HFB then ABD to leg aspect and mepilex to thigh aspect of this long wound.  2 layer wrap to leg.      Interdisciplinary consultation: Patient, Denise MORENO    EVALUATION / RATIONALE FOR TREATMENT:    10/23 wound bed mostly granular, superficial in depth, progress has been slow with the wound VAC so will trial collagen product to encourage new tissue growth.    10/19: wound bed has improved in color and is fully granulated.  Addison-wound has improved, denudation has resolved. APRN continuing with serial weekly debridements as needed.   10/16: wound friable pt now on iv abx per ID.  Indurated edges addressed with drape and foam.  Addison wound likely has yeast infection - addressing with silver powder crusting  10/14: patient seen with Asaf ELLER, stopping veraflo instillation.  Starting silver powder and regular wound VAC to see if it will help with bioburden.  Possible colonization of bacteria.  ID involved.   10/12: Inflammation noted to the proximal part of the wound bed.  Will continue to monitor, possible vac break on Wednesday to help addison-skin inflammation.   10/7: Excisional debridement performed by Asaf ELLER. Will need to continue selective debridement with NPWT changes, and weekly excisional debridement with APRN to flatten out the scar tissue.  IV abx therapy ended 10/5.   10/5: Lateral wound still with some slough, both wounds smaller per measurements. Medial wound with all granular tissue.  9/30:  Patient to start abx therapy for 7 days course per Dr. Ellis.  Started dakin's instillation to veraflo  9/28: malodorous today, culture taken.    9/25: Odor noted, though unclear if from wound or pt, consider shower before next Vac change. Consider regular vac next change, wound granular and superficial.   9/23: Patient still awaiting guardianship for placement.   9/18: wound granulating nicely and responding well to current treatment.    9/16: Wound bed with granular tissue, edges are thick but appear better than previous assessments. . NPWT VF to encourage closure by secondary intention and manage drainage while encouraging oxygenation and granulation to the wound bed. 2 layer compression to assist in decrease of swelling and encourage blood flow to wounds. Wound team to follow up and change 3x/week.      Goals: Steady decrease in wound area and depth weekly.    NURSING PLAN OF CARE ORDERS (X):    Dressing changes: See Dressing Care orders: X  Skin care: See Skin Care orders:   Rectal tube care: See Rectal Tube Care orders:   Other orders:      WOUND TEAM PLAN OF CARE:   Dressing changes by wound team:     X              Follow up 3 times weekly:                NPWT change 3 times weekly:    Follow up 1-2 times weekly:      Follow up Bi-Monthly:                   Follow up as needed:       Other (explain):     Anticipated discharge plans: pending guardianship.   LTACH:        SNF/Rehab: X - patient will need ongoing wound care for LLE upon discharge - 3x/week.                 Home Health Care:           Outpatient Wound Center:            Self Care:

## 2020-10-24 NOTE — PROGRESS NOTES
Hospital Medicine Twice Weekly Progress Note    Date of Service  10/24/2020    Chief Complaint  LLE Wound    Hospital Course   Mr. Varela is a 64-year-old male with PMH significant for homelessness, etoh use, tobacco dependence and chronic LLE wound who presented 8/7/2020 with LLE wound infection.  He was hospitalized at our facility in April and evaluated by orthopedic surgery who recommended wound care.  Wound cultures at that time grew MSSA and strep. Patient reported losing his Medicaid card and having no transportation to wound clinic.  He was seen at [facility] earlier this week and prescribed ABX, however he has not filled the prescription.  US LLE negative for DVT.  Treated for sepsis with empiric ABX and wound care. He completed ABX course 9/16. He was found to have head lice and underwent treatments. Continued to have intermittent agitation so was started on risperdol, depakote and gabapentin per psych recs.  He has been deemed incapacitated to make medical decisions and is currently pending guardianship and placement.  He is medically stable and will be only be evaluated 2 times weekly unless there is a clinical change. Repeat wound culture 9/28 grew Strep A and proteus. ID was consulted and recommended 5-day IV ABX with Zosyn - completed ABX 10/5. Wound care team noticed increased inflammation to the proximal part of the wound bed on 10/12.  They switched from vera flow wound VAC to regular wound VAC.  ID was consulted again on 10/14 and recommended 7-day course ABX with meropenem with anticipated stop date 10/22.        Interval Problem Update  10/21: Reports LLE pain > RLE. Reports mild improvement in pain control with Lidoderm patch. He ambulated to bathroom independently with strong and steady gait. Wound vac with serosanguinous output. VSS on room air. Afebrile. Continue Meropenum until 10/21 per ID recommendations.    10/24: Patient was found to have lice by RN yesterday. Head was shaved  and placed on contact precautions. He reports pain well controlled. Wounds covered with dressing which are c/d/i and without odor. Completed abx     Consultants/Specialty  Infectious Disease  Psychiatry  LPS    Code Status  Full Code    Disposition  Pending guardianship and placement - likely Group Home     Review of Systems  Review of Systems   Constitutional: Negative for chills, fever and malaise/fatigue.   HENT: Negative.    Respiratory: Negative for cough and shortness of breath.    Cardiovascular: Negative for chest pain and palpitations.   Gastrointestinal: Negative for abdominal pain, nausea and vomiting.   Musculoskeletal: Positive for myalgias. Negative for back pain, falls, joint pain and neck pain.   Neurological: Positive for weakness. Negative for dizziness and headaches.        Physical Exam  Temp:  [36 °C (96.8 °F)-37.1 °C (98.7 °F)] 37 °C (98.6 °F)  Pulse:  [69-88] 69  Resp:  [16-18] 16  BP: ()/(52-77) 97/52  SpO2:  [93 %-98 %] 93 %    Physical Exam  Vitals signs and nursing note reviewed. Exam conducted with a chaperone present (1:1 sitter ).   Constitutional:       General: He is awake. He is not in acute distress.     Appearance: He is underweight. He is not toxic-appearing or diaphoretic.      Comments: Chronically ill appearing    HENT:      Head: Normocephalic and atraumatic.      Nose: Nose normal.      Mouth/Throat:      Lips: Pink.      Mouth: Mucous membranes are moist.   Eyes:      General: No scleral icterus.     Conjunctiva/sclera: Conjunctivae normal.   Neck:      Musculoskeletal: Normal range of motion.   Cardiovascular:      Pulses: Normal pulses.   Pulmonary:      Effort: Pulmonary effort is normal.   Abdominal:      General: There is no distension.      Tenderness: There is no abdominal tenderness.   Musculoskeletal:      Left upper leg: He exhibits tenderness. He exhibits no swelling and no edema.      Right lower leg: No edema.      Left lower leg: No edema.      Comments:  Ambulates independently with steady gait.   Skin:     General: Skin is warm and dry.      Comments: Left leg wound - dressing c/d/i     Neurological:      Mental Status: He is alert.      Coordination: Coordination is intact.      Gait: Gait is intact. Gait normal.   Psychiatric:         Mood and Affect: Mood normal.         Speech: Speech normal.         Behavior: Behavior is not aggressive or combative. Behavior is cooperative.         Cognition and Memory: Cognition is impaired.         Judgment: Judgment is impulsive.      Comments:            Fluids  No intake or output data in the 24 hours ending 10/24/20 1137    Laboratory                        Imaging  IR-MIDLINE CATHETER INSERTION WO GUIDANCE > AGE 3   Final Result                  Ultrasound-guided midline placement performed by qualified nursing staff    as above.          IR-US GUIDED PIV   Final Result    Ultrasound-guided PERIPHERAL IV INSERTION performed by    qualified nursing staff as above.      IR-MIDLINE CATHETER INSERTION WO GUIDANCE > AGE 3   Final Result                  Ultrasound-guided midline placement performed by qualified nursing staff    as above.          US-KOSTAS SINGLE LEVEL BILAT   Final Result      CT-HEAD W/O   Final Result      No acute intracranial abnormality      DX-TIBIA AND FIBULA LEFT   Final Result      1.  Healed distal metaphyseal fractures of the left fibula and tibia with tibial IM layla in place.      2.  No acute fracture or dislocation.      3.  No radiopaque soft tissue foreign body.      DX-FEMUR-2+ LEFT   Final Result      1.  No radiographic evidence of acute traumatic injury left femur.      2.  Unchanged cortical thickening in the posterior aspect of the distal femoral diaphysis which may represent sequela of prior injury or infection.      3.  No soft tissue foreign body identified.      US-EXTREMITY VENOUS LOWER UNILAT LEFT   Final Result      DX-CHEST-PORTABLE (1 VIEW)   Final Result      No acute  cardiopulmonary abnormality.           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   -Continue wound vac per wound care   -Cx 9/1 Strep, MRSA, diptheroids: completed Zyvox 9/2 thru 9/16. cx 9/28 strep A and proteus :completed course of IV zosyn which completed on 10/5.  - 7-day course Meropenum completed 10/21 per ID recs due to concerns for recurrent infection    10/24: wound healing well. No evidence of infection. Monitor     Encephalopathy- (present on admission)  Assessment & Plan  -acute on chronic, likely due Wernicke's   -Psychiatry: incapacitated to make medical decision or leave AMA   -Requires guardianship and placement  -Avoid narcotics and hypnotics.    -Frequent reorientation  -Sleep hygiene    Non-compliance  Assessment & Plan  -likely also component of psychiatric disorder and ETOH abuse  -Psychiatric consulted and suggests patient is incapacitated to make medical decisions or leave AMA  -ongoing encouragement for compliance.    Psychiatric disorder  Assessment & Plan  -unknown/unspecified  -psychiatry consulted and deemed him incapacitated to leave AMA or make medical decisions  -continue risperdol BID,  depakote 125mg TID   -Pending guardianship. Application submitted 9/3.    Flexion contracture of knee, 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)  Assessment & Plan  -secondary to chronic wound in that area  -PT OT  -encourage mobility    Acute pain  Assessment & Plan  -multifactorial: acute on chronic back pain without sciatica. No bony tenderness, no deformities. No recent trauma. Ambulatory independently at his baseline with steady gait. Main complaint is muscle spasms.  -Continue oxycodone, flexeril, gabapentin and Lidoderm. Wean as tolerated   -hold all narcotics and muscle relaxer's for RR < 12 or altered mentation  -Encourage mobility  -heat  packs    10/24: Added Toradol Q6 and decreased oxycodone frequency to Q6. Continue to wean oxy as tolerated     Emphysema/COPD (HCC)  Assessment & Plan  -chronic and stable without acute exacerbation    Pediculosis capitis  Assessment & Plan  -Lice found for 2nd time this admission.   -head shaved and shampoo ordered   -Monitor for improvement       Alcohol dependence (HCC)- (present on admission)  Assessment & Plan  -history of   -abstinent since admission    Essential hypertension- (present on admission)  Assessment & Plan  Controlled on Norvasc   Monitor VS per protocol     Protein malnutrition (HCC)  Assessment & Plan  -history of ETOH and homelessness  -moderate/severe  -Body mass index is 20.33 kg/m².  -TID supplements  -encourage PO intake    Tobacco dependence- (present on admission)  Assessment & Plan  -abstinent since admission       VTE prophylaxis: Ambulatory    I have performed a physical exam and reviewed and updated ROS and Plan today (10/24/2020). In review of previous note, there are no changes except as documented above.       LOUISE Aden.

## 2020-10-25 LAB
ANION GAP SERPL CALC-SCNC: 8 MMOL/L (ref 7–16)
BUN SERPL-MCNC: 30 MG/DL (ref 8–22)
CALCIUM SERPL-MCNC: 9 MG/DL (ref 8.5–10.5)
CHLORIDE SERPL-SCNC: 94 MMOL/L (ref 96–112)
CO2 SERPL-SCNC: 24 MMOL/L (ref 20–33)
CREAT SERPL-MCNC: 0.68 MG/DL (ref 0.5–1.4)
GLUCOSE SERPL-MCNC: 98 MG/DL (ref 65–99)
POTASSIUM SERPL-SCNC: 4.6 MMOL/L (ref 3.6–5.5)
SODIUM SERPL-SCNC: 126 MMOL/L (ref 135–145)

## 2020-10-25 PROCEDURE — 770021 HCHG ROOM/CARE - ISO PRIVATE

## 2020-10-25 PROCEDURE — 80048 BASIC METABOLIC PNL TOTAL CA: CPT

## 2020-10-25 PROCEDURE — A9270 NON-COVERED ITEM OR SERVICE: HCPCS | Performed by: NURSE PRACTITIONER

## 2020-10-25 PROCEDURE — 700101 HCHG RX REV CODE 250: Performed by: NURSE PRACTITIONER

## 2020-10-25 PROCEDURE — 700102 HCHG RX REV CODE 250 W/ 637 OVERRIDE(OP): Performed by: NURSE PRACTITIONER

## 2020-10-25 PROCEDURE — 36415 COLL VENOUS BLD VENIPUNCTURE: CPT

## 2020-10-25 PROCEDURE — 700111 HCHG RX REV CODE 636 W/ 250 OVERRIDE (IP): Performed by: NURSE PRACTITIONER

## 2020-10-25 RX ADMIN — OXYCODONE HYDROCHLORIDE 10 MG: 10 TABLET ORAL at 21:01

## 2020-10-25 RX ADMIN — RISPERIDONE 0.5 MG: 0.5 TABLET ORAL at 05:30

## 2020-10-25 RX ADMIN — RISPERIDONE 1 MG: 1 TABLET ORAL at 17:25

## 2020-10-25 RX ADMIN — LIDOCAINE 1 PATCH: 50 PATCH CUTANEOUS at 12:41

## 2020-10-25 RX ADMIN — GABAPENTIN 200 MG: 100 CAPSULE ORAL at 12:41

## 2020-10-25 RX ADMIN — Medication 400 MG: at 17:24

## 2020-10-25 RX ADMIN — HYDROXYZINE HYDROCHLORIDE 25 MG: 50 TABLET, FILM COATED ORAL at 00:51

## 2020-10-25 RX ADMIN — DIVALPROEX SODIUM 125 MG: 125 CAPSULE ORAL at 14:58

## 2020-10-25 RX ADMIN — CYCLOBENZAPRINE 10 MG: 10 TABLET, FILM COATED ORAL at 05:30

## 2020-10-25 RX ADMIN — GABAPENTIN 200 MG: 100 CAPSULE ORAL at 17:25

## 2020-10-25 RX ADMIN — AMLODIPINE BESYLATE 5 MG: 5 TABLET ORAL at 05:30

## 2020-10-25 RX ADMIN — DIVALPROEX SODIUM 125 MG: 125 CAPSULE ORAL at 21:01

## 2020-10-25 RX ADMIN — KETOROLAC TROMETHAMINE 15 MG: 30 INJECTION, SOLUTION INTRAMUSCULAR; INTRAVENOUS at 05:29

## 2020-10-25 RX ADMIN — OXYCODONE HYDROCHLORIDE 10 MG: 10 TABLET ORAL at 00:51

## 2020-10-25 RX ADMIN — Medication 400 MG: at 05:29

## 2020-10-25 RX ADMIN — CYCLOBENZAPRINE 10 MG: 10 TABLET, FILM COATED ORAL at 14:58

## 2020-10-25 RX ADMIN — OXYCODONE HYDROCHLORIDE 10 MG: 10 TABLET ORAL at 14:58

## 2020-10-25 RX ADMIN — DIVALPROEX SODIUM 125 MG: 125 CAPSULE ORAL at 05:30

## 2020-10-25 ASSESSMENT — PATIENT HEALTH QUESTIONNAIRE - PHQ9
2. FEELING DOWN, DEPRESSED, IRRITABLE, OR HOPELESS: NOT AT ALL
SUM OF ALL RESPONSES TO PHQ9 QUESTIONS 1 AND 2: 0
SUM OF ALL RESPONSES TO PHQ9 QUESTIONS 1 AND 2: 0
1. LITTLE INTEREST OR PLEASURE IN DOING THINGS: NOT AT ALL
1. LITTLE INTEREST OR PLEASURE IN DOING THINGS: NOT AT ALL
2. FEELING DOWN, DEPRESSED, IRRITABLE, OR HOPELESS: NOT AT ALL

## 2020-10-25 ASSESSMENT — PAIN DESCRIPTION - PAIN TYPE
TYPE: CHRONIC PAIN

## 2020-10-25 NOTE — CARE PLAN
Problem: Safety  Goal: Will remain free from falls  Outcome: PROGRESSING AS EXPECTED     Problem: Infection  Goal: Will remain free from infection  Outcome: PROGRESSING AS EXPECTED     Problem: Pain Management  Goal: Pain level will decrease to patient's comfort goal  Outcome: PROGRESSING AS EXPECTED     Problem: Mobility  Goal: Risk for activity intolerance will decrease  Outcome: MET     Problem: Respiratory:  Goal: Respiratory status will improve  Outcome: MET

## 2020-10-25 NOTE — CARE PLAN
Problem: Pain Management  Goal: Pain level will decrease to patient's comfort goal  Outcome: PROGRESSING AS EXPECTED  Patient's pain is currently well managed with pharmacologic & non-pharmacologic regimen.     Problem: Psychosocial Needs:  Goal: Level of anxiety will decrease  Outcome: PROGRESSING SLOWER THAN EXPECTED  Patient continues to have sporadic outbursts of yelling, usually in frustration with equipment getting in his way.

## 2020-10-26 PROCEDURE — A9270 NON-COVERED ITEM OR SERVICE: HCPCS | Performed by: NURSE PRACTITIONER

## 2020-10-26 PROCEDURE — 700102 HCHG RX REV CODE 250 W/ 637 OVERRIDE(OP): Performed by: NURSE PRACTITIONER

## 2020-10-26 PROCEDURE — 29581 APPL MULTLAYER CMPRN SYS LEG: CPT

## 2020-10-26 PROCEDURE — 770021 HCHG ROOM/CARE - ISO PRIVATE

## 2020-10-26 PROCEDURE — 700101 HCHG RX REV CODE 250: Performed by: NURSE PRACTITIONER

## 2020-10-26 PROCEDURE — 97598 DBRDMT OPN WND ADDL 20CM/<: CPT

## 2020-10-26 RX ADMIN — GABAPENTIN 200 MG: 100 CAPSULE ORAL at 18:02

## 2020-10-26 RX ADMIN — AMLODIPINE BESYLATE 5 MG: 5 TABLET ORAL at 05:50

## 2020-10-26 RX ADMIN — Medication 400 MG: at 18:02

## 2020-10-26 RX ADMIN — OXYCODONE HYDROCHLORIDE 10 MG: 10 TABLET ORAL at 20:08

## 2020-10-26 RX ADMIN — DIVALPROEX SODIUM 125 MG: 125 CAPSULE ORAL at 14:43

## 2020-10-26 RX ADMIN — DIVALPROEX SODIUM 125 MG: 125 CAPSULE ORAL at 20:08

## 2020-10-26 RX ADMIN — GABAPENTIN 200 MG: 100 CAPSULE ORAL at 14:43

## 2020-10-26 RX ADMIN — GABAPENTIN 200 MG: 100 CAPSULE ORAL at 05:49

## 2020-10-26 RX ADMIN — Medication 400 MG: at 05:50

## 2020-10-26 RX ADMIN — RISPERIDONE 1 MG: 1 TABLET ORAL at 18:02

## 2020-10-26 RX ADMIN — OXYCODONE HYDROCHLORIDE 10 MG: 10 TABLET ORAL at 13:18

## 2020-10-26 RX ADMIN — LIDOCAINE 1 PATCH: 50 PATCH CUTANEOUS at 13:18

## 2020-10-26 RX ADMIN — DIVALPROEX SODIUM 125 MG: 125 CAPSULE ORAL at 05:50

## 2020-10-26 RX ADMIN — RISPERIDONE 0.5 MG: 0.5 TABLET ORAL at 05:50

## 2020-10-26 RX ADMIN — OXYCODONE HYDROCHLORIDE 10 MG: 10 TABLET ORAL at 05:50

## 2020-10-26 ASSESSMENT — PATIENT HEALTH QUESTIONNAIRE - PHQ9
1. LITTLE INTEREST OR PLEASURE IN DOING THINGS: NOT AT ALL
2. FEELING DOWN, DEPRESSED, IRRITABLE, OR HOPELESS: NOT AT ALL
2. FEELING DOWN, DEPRESSED, IRRITABLE, OR HOPELESS: NOT AT ALL
SUM OF ALL RESPONSES TO PHQ9 QUESTIONS 1 AND 2: 0
SUM OF ALL RESPONSES TO PHQ9 QUESTIONS 1 AND 2: 0
1. LITTLE INTEREST OR PLEASURE IN DOING THINGS: NOT AT ALL

## 2020-10-26 ASSESSMENT — PAIN DESCRIPTION - PAIN TYPE
TYPE: CHRONIC PAIN

## 2020-10-26 NOTE — CARE PLAN
"Problem: Pain Management  Goal: Pain level will decrease to patient's comfort goal  Outcome: PROGRESSING SLOWER THAN EXPECTED  Patient states that his LLE is \"Always a 10\" of 10 pain.     Problem: Psychosocial Needs:  Goal: Level of anxiety will decrease  Outcome: PROGRESSING AS EXPECTED  Patient has no complaints of anxiety, however, patient exhibits signs of frustration at self and often yells or curses.   "

## 2020-10-26 NOTE — CARE PLAN
Problem: Bowel/Gastric:  Goal: Normal bowel function is maintained or improved  Outcome: PROGRESSING AS EXPECTED   Implement bowel protocol early to prevent constipation.   Problem: Pain Management  Goal: Pain level will decrease to patient's comfort goal  Outcome: PROGRESSING AS EXPECTED   Patient's pain level is managed on oral medications.

## 2020-10-26 NOTE — DISCHARGE PLANNING
Anticipated Discharge Disposition: group home    Action: received mail for pt from Tati Blancas CM . Delivered to pt at bedside.    Barriers to Discharge: nayla, group home acceptance    Plan: f/u with medical team, Teri Madrigal.

## 2020-10-26 NOTE — WOUND TEAM
[facility]  Inpatient Services   Wound and Skin Care Progress Note    Admission Date: 8/7/2020     Last order of IP CONSULT TO WOUND CARE was found on 9/1/2020 from Hospital Encounter on 8/7/2020     HPI, PMH, SH: Reviewed    Unit where seen by Wound Team: [id number]     WOUND CONSULT/FOLLOW UP RELATED TO:  Left leg     Self Report / Pain Level: none reported    OBJECTIVE:  Vac dressing in place, 2 layer     WOUND TYPE, LOCATION, CHARACTERISTICS (Pressure Injuries: location, stage, POA or date identified)     Wound 08/07/20 Full Thickness Wound Leg LLE posterior, extends medially and laterally (Active)   Wound Image     10/21/20 1100   Site Assessment Pink;Red;Pale 10/26/20 1000   Periwound Assessment Scar tissue;Clean;Dry;Intact 10/26/20 1000   Margins Defined edges;Attached edges 10/26/20 1000   Closure Secondary intention 10/26/20 1000   Drainage Amount Moderate 10/26/20 1000   Drainage Description Serosanguineous 10/26/20 1000   Treatments Cleansed;Irrigation;Site care;CSWD - Conservative Sharp Wound Debridement 10/26/20 1000   Wound Cleansing Approved Wound Cleanser 10/26/20 1000   Periwound Protectant Skin Protectant Wipes to Periwound 10/26/20 1000   Dressing Cleansing/Solutions Not Applicable 10/26/20 1000   Dressing Options Collagen Dressing;Hydrofera Blue Ready;Compression Wrap Two Layer;Mepilex 10/26/20 1000   Dressing Changed Changed 10/26/20 1000   Dressing Status Clean;Dry;Intact 10/26/20 1000   Dressing Change/Treatment Frequency Every 72 hrs, and As Needed 10/26/20 1000   NEXT Dressing Change/Treatment Date 10/29/20 10/26/20 1000   NEXT Weekly Photo (Inpatient Only) 10/29/20 10/26/20 1000   Non-staged Wound Description Full thickness 10/23/20 1800   Wound Length (cm) 30 cm 10/21/20 1100   Wound Width (cm) 14 cm 10/21/20 1100   Wound Depth (cm) 0.2 cm 10/21/20 1100   Wound Surface Area (cm^2) 420 cm^2 10/21/20 1100   Wound Volume (cm^3) 84 cm^3 10/21/20 1100   Post-Procedure Length (cm) 30  cm 10/14/20 1100   Post-Procedure Width (cm) 15 cm 10/14/20 1100   Post-Procedure Depth (cm) 0.3 cm 10/14/20 1100   Post-Procedure Surface Area (cm^2) 450 cm^2 10/14/20 1100   Post-Procedure Volume (cm^3) 135 cm^3 10/14/20 1100   Wound Healing % -60 10/21/20 1100   Wound Bed Granulation (%) 80 % 10/23/20 1800   Wound Bed Epithelium (%) 0 % 20 1300   Wound Bed Slough (%) 20 % 10/23/20 1800   Wound Bed Granulation (%) - Post-Procedure 100 % 10/14/20 1100   Wound Bed Epithelium % - (Post-Procedure) 0 % 20 1300   Wound Bed Slough % - (Post-Procedure) 0 % 10/07/20 1100   Wound Bed Eschar % - (Post-Procedure) 0 % 20 1300   Tunneling (cm) 0 cm 20 1300   Undermining (cm) 0 cm 20 1300   Shape Irregular 10/26/20 1000   Wound Odor None 10/26/20 1000   Pulses Left;2+;DP;PT 10/26/20 1000   Exposed Structures None 10/26/20 1000   WOUND NURSE ONLY - Time Spent with Patient (mins) 45 10/26/20 1000               VASCULAR    CESAR:   CESAR Results, Last 30 Days Us-cesar Single Level Bilat    Result Date: 2020  Narrative  Vascular Laboratory  Conclusions  1.  Normal bilateral CESAR's.  GRUPO GANN  Age:    64    Gender:     M  MRN:    [id number]  :    1955      BSA:  Exam Date:     2020 09:59  Room #:     Inpatient  Priority:     Routine  Ht (in):             Wt (lb):  Ordering Physician:        EDDA CANADA  Referring Physician:       [id number] Morrissey  Sonographer:               Deja Ellis RDMS                             RVT  Study Type:                Complete Bilateral  Technical Quality:         Adequate  Indications:     Ulceration of LE  CPT Codes:       65367  ICD Codes:       707.1  History:         Nonhealing wound of left lower extremity.  Limitations:                 RIGHT  Waveform            Systolic BPs (mmHg)                             117           Brachial  Triphasic                                 Common Femoral  Triphasic                  138           Posterior Tibial  Triphasic                  132           Dorsalis Pedis                                           Peroneal                             1.18          KOSTAS                                           TBI                       LEFT  Waveform        Systolic BPs (mmHg)                             114           Brachial  Triphasic                                Common Femoral  Triphasic                  127           Posterior Tibial  Triphasic                  122           Dorsalis Pedis                                           Peroneal                             1.09          KOSTAS                                           TBI  Findings  Right.  Doppler waveforms of the common femoral artery are of high amplitude and  triphasic.  Doppler waveforms at the ankle are brisk and triphasic.  Ankle-brachial index is normal.  Left.  Doppler waveforms of the common femoral artery are of high amplitude and  triphasic.  Doppler waveforms at the ankle are brisk and triphasic.  Ankle-brachial index is normal.  Unable to obtained popliteal waveforms due to wound bandages.  Additional testing was not performed in accordance with lower extremity  arterial evaluation protocol.  Clover Maya  (Electronically Signed)  Final Date:      02 September 2020                   11:14    Lab Values:    Lab Results   Component Value Date/Time    WBC 6.0 10/14/2020 11:43 AM    RBC 3.87 (L) 10/14/2020 11:43 AM    HEMOGLOBIN 11.0 (L) 10/14/2020 11:43 AM    HEMATOCRIT 33.9 (L) 10/14/2020 11:43 AM    CREACTPROT 1.07 (H) 08/12/2020 01:55 PM    SEDRATEWES 85 (H) 08/07/2020 01:20 PM    HBA1C 5.7 (H) 11/09/2018 06:30 PM        Culture Results show:  Recent Results (from the past 720 hour(s))   CULTURE WOUND W/ GRAM STAIN    Collection Time: 09/01/20  2:00 PM    Specimen: Left Leg; Wound   Result Value Ref Range    Significant Indicator POS (POS)     Source WND     Site LEFT LEG      Culture Result - (A)     Gram Stain Result       Moderate Gram positive cocci.  Moderate Gram positive rods.      Culture Result (A)      Beta Hemolytic Streptococcus group A  Moderate growth      Culture Result (A)      Methicillin Resistant Staphylococcus aureus  Moderate growth      Culture Result (A)      Diphtheroids  Moderate growth  Mixed morphologies.         INTERVENTIONS BY WOUND TEAM:    Dressings removed and wound and addison wound cleansed with NS and wound cleanser, patted dry with gauze.  CSWD with curette to remove ~35cm2 of non-viable tissue and slough from wound bed.  Wound cleanser to cleanse again.  Collagen dressing onto wound beds allowing pt's wound drainage to activate product, covered with HFB then ABD to leg aspect and mepilex to thigh aspect of this long wound.  2 layer wrap to left LE.       Interdisciplinary consultation: Patient, Bedside RN (Sanjuana)    EVALUATION / RATIONALE FOR TREATMENT:    10/23 wound bed mostly granular, superficial in depth, progress has been slow with the wound VAC so will trial collagen product to encourage new tissue growth.    10/19: wound bed has improved in color and is fully granulated.  Addison-wound has improved, denudation has resolved. APRN continuing with serial weekly debridements as needed.   10/16: wound friable pt now on iv abx per ID.  Indurated edges addressed with drape and foam.  Addison wound likely has yeast infection - addressing with silver powder crusting  10/14: patient seen with Asaf ELLER, stopping veraflo instillation.  Starting silver powder and regular wound VAC to see if it will help with bioburden.  Possible colonization of bacteria.  ID involved.   10/12: Inflammation noted to the proximal part of the wound bed.  Will continue to monitor, possible vac break on Wednesday to help addison-skin inflammation.   10/7: Excisional debridement performed by Asaf ELLER. Will need to continue selective debridement with NPWT changes, and  weekly excisional debridement with APRN to flatten out the scar tissue.  IV abx therapy ended 10/5.   10/5: Lateral wound still with some slough, both wounds smaller per measurements. Medial wound with all granular tissue.  9/30: Patient to start abx therapy for 7 days course per Dr. Ellis.  Started dakin's instillation to veraflo  9/28: malodorous today, culture taken.    9/25: Odor noted, though unclear if from wound or pt, consider shower before next Vac change. Consider regular vac next change, wound granular and superficial.   9/23: Patient still awaiting guardianship for placement.   9/18: wound granulating nicely and responding well to current treatment.    9/16: Wound bed with granular tissue, edges are thick but appear better than previous assessments. . NPWT VF to encourage closure by secondary intention and manage drainage while encouraging oxygenation and granulation to the wound bed. 2 layer compression to assist in decrease of swelling and encourage blood flow to wounds. Wound team to follow up and change 3x/week.      Goals: Steady decrease in wound area and depth weekly.    NURSING PLAN OF CARE ORDERS (X):    Dressing changes: See Dressing Care orders: X  Skin care: See Skin Care orders:   Rectal tube care: See Rectal Tube Care orders:   Other orders:      WOUND TEAM PLAN OF CARE:   Dressing changes by wound team:     X, 2 layer compression wrap 2x/weekly due to drainage.   Follow up 3 times weekly:                NPWT change 3 times weekly:    Follow up 1-2 times weekly:      Follow up Bi-Monthly:                   Follow up as needed:       Other (explain):     Anticipated discharge plans: pending guardianship.   LTACH:        SNF/Rehab: X - patient will need ongoing wound care for LLE upon discharge - 2x/weekly           Home Health Care:           Outpatient Wound Center:            Self Care:

## 2020-10-27 PROCEDURE — 770021 HCHG ROOM/CARE - ISO PRIVATE

## 2020-10-27 PROCEDURE — A9270 NON-COVERED ITEM OR SERVICE: HCPCS | Performed by: NURSE PRACTITIONER

## 2020-10-27 PROCEDURE — 700102 HCHG RX REV CODE 250 W/ 637 OVERRIDE(OP): Performed by: NURSE PRACTITIONER

## 2020-10-27 PROCEDURE — 700101 HCHG RX REV CODE 250: Performed by: NURSE PRACTITIONER

## 2020-10-27 RX ADMIN — CYCLOBENZAPRINE 10 MG: 10 TABLET, FILM COATED ORAL at 10:34

## 2020-10-27 RX ADMIN — LIDOCAINE 1 PATCH: 50 PATCH CUTANEOUS at 13:16

## 2020-10-27 RX ADMIN — OXYCODONE HYDROCHLORIDE 10 MG: 10 TABLET ORAL at 05:45

## 2020-10-27 RX ADMIN — Medication 400 MG: at 05:44

## 2020-10-27 RX ADMIN — Medication 400 MG: at 16:54

## 2020-10-27 RX ADMIN — OXYCODONE HYDROCHLORIDE 10 MG: 10 TABLET ORAL at 13:21

## 2020-10-27 RX ADMIN — DIVALPROEX SODIUM 125 MG: 125 CAPSULE ORAL at 13:16

## 2020-10-27 RX ADMIN — RISPERIDONE 1 MG: 1 TABLET ORAL at 16:54

## 2020-10-27 RX ADMIN — GABAPENTIN 200 MG: 100 CAPSULE ORAL at 16:54

## 2020-10-27 RX ADMIN — DIVALPROEX SODIUM 125 MG: 125 CAPSULE ORAL at 21:39

## 2020-10-27 RX ADMIN — GABAPENTIN 200 MG: 100 CAPSULE ORAL at 13:16

## 2020-10-27 RX ADMIN — GABAPENTIN 200 MG: 100 CAPSULE ORAL at 05:45

## 2020-10-27 RX ADMIN — DIVALPROEX SODIUM 125 MG: 125 CAPSULE ORAL at 05:45

## 2020-10-27 RX ADMIN — OXYCODONE HYDROCHLORIDE 10 MG: 10 TABLET ORAL at 19:56

## 2020-10-27 RX ADMIN — RISPERIDONE 0.5 MG: 0.5 TABLET ORAL at 05:45

## 2020-10-27 RX ADMIN — AMLODIPINE BESYLATE 5 MG: 5 TABLET ORAL at 05:45

## 2020-10-27 ASSESSMENT — PAIN DESCRIPTION - PAIN TYPE
TYPE: CHRONIC PAIN

## 2020-10-27 NOTE — CARE PLAN
Problem: Pain Management  Goal: Pain level will decrease to patient's comfort goal  Outcome: PROGRESSING AS EXPECTED   Assess pain level regularly, medicate for pain, educate patient on non-pharmacological pain management.   Problem: Psychosocial Needs:  Goal: Level of anxiety will decrease  Outcome: PROGRESSING AS EXPECTED  Encourage patient to voice feelings, talk patient through his anxiety attack episodes. Educate patient on distraction and relaxation techniques.

## 2020-10-27 NOTE — CARE PLAN
"Problem: Bowel/Gastric:  Goal: Normal bowel function is maintained or improved  Outcome: PROGRESSING SLOWER THAN EXPECTED  Patient is up self in room and declines to answer when his last BM was. Patient states \"I don't keep track of that stuff.\"     Problem: Pain Management  Goal: Pain level will decrease to patient's comfort goal  Outcome: PROGRESSING SLOWER THAN EXPECTED  Patient states his LLE wound is \"always a 10\" of 10 pain.   "

## 2020-10-28 LAB
ANION GAP SERPL CALC-SCNC: 7 MMOL/L (ref 7–16)
BUN SERPL-MCNC: 19 MG/DL (ref 8–22)
CALCIUM SERPL-MCNC: 9.4 MG/DL (ref 8.5–10.5)
CHLORIDE SERPL-SCNC: 97 MMOL/L (ref 96–112)
CO2 SERPL-SCNC: 29 MMOL/L (ref 20–33)
CREAT SERPL-MCNC: 0.82 MG/DL (ref 0.5–1.4)
GLUCOSE SERPL-MCNC: 77 MG/DL (ref 65–99)
POTASSIUM SERPL-SCNC: 3.9 MMOL/L (ref 3.6–5.5)
SODIUM SERPL-SCNC: 133 MMOL/L (ref 135–145)

## 2020-10-28 PROCEDURE — A9270 NON-COVERED ITEM OR SERVICE: HCPCS | Performed by: NURSE PRACTITIONER

## 2020-10-28 PROCEDURE — 99231 SBSQ HOSP IP/OBS SF/LOW 25: CPT | Performed by: NURSE PRACTITIONER

## 2020-10-28 PROCEDURE — 700102 HCHG RX REV CODE 250 W/ 637 OVERRIDE(OP): Performed by: NURSE PRACTITIONER

## 2020-10-28 PROCEDURE — 80048 BASIC METABOLIC PNL TOTAL CA: CPT

## 2020-10-28 PROCEDURE — 36415 COLL VENOUS BLD VENIPUNCTURE: CPT

## 2020-10-28 PROCEDURE — 700101 HCHG RX REV CODE 250: Performed by: NURSE PRACTITIONER

## 2020-10-28 PROCEDURE — 770021 HCHG ROOM/CARE - ISO PRIVATE

## 2020-10-28 RX ORDER — OXYCODONE HYDROCHLORIDE 10 MG/1
10 TABLET ORAL EVERY 6 HOURS PRN
Status: DISCONTINUED | OUTPATIENT
Start: 2020-10-28 | End: 2021-03-02

## 2020-10-28 RX ADMIN — Medication 400 MG: at 04:07

## 2020-10-28 RX ADMIN — RISPERIDONE 0.5 MG: 0.5 TABLET ORAL at 04:07

## 2020-10-28 RX ADMIN — GABAPENTIN 200 MG: 100 CAPSULE ORAL at 04:07

## 2020-10-28 RX ADMIN — GABAPENTIN 200 MG: 100 CAPSULE ORAL at 17:04

## 2020-10-28 RX ADMIN — OXYCODONE HYDROCHLORIDE 10 MG: 10 TABLET ORAL at 04:07

## 2020-10-28 RX ADMIN — Medication 400 MG: at 17:03

## 2020-10-28 RX ADMIN — OXYCODONE HYDROCHLORIDE 10 MG: 10 TABLET ORAL at 17:03

## 2020-10-28 RX ADMIN — DIVALPROEX SODIUM 125 MG: 125 CAPSULE ORAL at 21:42

## 2020-10-28 RX ADMIN — GABAPENTIN 200 MG: 100 CAPSULE ORAL at 12:10

## 2020-10-28 RX ADMIN — LIDOCAINE 1 PATCH: 50 PATCH CUTANEOUS at 12:09

## 2020-10-28 RX ADMIN — AMLODIPINE BESYLATE 5 MG: 5 TABLET ORAL at 04:07

## 2020-10-28 RX ADMIN — RISPERIDONE 1 MG: 1 TABLET ORAL at 17:02

## 2020-10-28 RX ADMIN — CYCLOBENZAPRINE 10 MG: 10 TABLET, FILM COATED ORAL at 21:42

## 2020-10-28 RX ADMIN — DIVALPROEX SODIUM 125 MG: 125 CAPSULE ORAL at 13:46

## 2020-10-28 RX ADMIN — DIVALPROEX SODIUM 125 MG: 125 CAPSULE ORAL at 04:08

## 2020-10-28 RX ADMIN — OXYCODONE HYDROCHLORIDE 10 MG: 10 TABLET ORAL at 10:26

## 2020-10-28 ASSESSMENT — ENCOUNTER SYMPTOMS
WEAKNESS: 1
VOMITING: 0
PALPITATIONS: 0
DOUBLE VISION: 0
MYALGIAS: 0
NAUSEA: 0
SHORTNESS OF BREATH: 0
BACK PAIN: 0
BLURRED VISION: 0
HEADACHES: 0
ABDOMINAL PAIN: 0
DIZZINESS: 0
CONSTIPATION: 0
FEVER: 0

## 2020-10-28 ASSESSMENT — PAIN DESCRIPTION - PAIN TYPE
TYPE: CHRONIC PAIN

## 2020-10-28 NOTE — CARE PLAN
Problem: Safety  Goal: Will remain free from falls  Outcome: PROGRESSING AS EXPECTED     Problem: Infection  Goal: Will remain free from infection  Outcome: PROGRESSING AS EXPECTED     Problem: Venous Thromboembolism (VTW)/Deep Vein Thrombosis (DVT) Prevention:  Goal: Patient will participate in Venous Thrombosis (VTE)/Deep Vein Thrombosis (DVT)Prevention Measures  Outcome: PROGRESSING AS EXPECTED     Problem: Bowel/Gastric:  Goal: Normal bowel function is maintained or improved  Outcome: PROGRESSING AS EXPECTED  Goal: Will not experience complications related to bowel motility  Outcome: PROGRESSING AS EXPECTED     Problem: Fluid Volume:  Goal: Will maintain balanced intake and output  Outcome: PROGRESSING AS EXPECTED     Problem: Pain Management  Goal: Pain level will decrease to patient's comfort goal  Outcome: PROGRESSING AS EXPECTED

## 2020-10-28 NOTE — PROGRESS NOTES
Hospital Medicine Twice Weekly Progress Note    Date of Service  10/28/2020    Chief Complaint  LLE Wound    Hospital Course   Mr. Varela is a 64-year-old male with PMH significant for homelessness, etoh use, tobacco dependence and chronic LLE wound who presented 8/7/2020 with LLE wound infection.  He was hospitalized at our facility in April and evaluated by orthopedic surgery who recommended wound care.  Wound cultures at that time grew MSSA and strep. Patient reported losing his Medicaid card and having no transportation to wound clinic.  He was seen at [facility] earlier this week and prescribed ABX, however he has not filled the prescription.  US LLE negative for DVT.  Treated for sepsis with empiric ABX and wound care. He completed ABX course 9/16. He was found to have head lice and underwent treatments. Continued to have intermittent agitation so was started on risperdol, depakote and gabapentin per psych recs.  He has been deemed incapacitated to make medical decisions and is currently pending guardianship and placement.  He is medically stable and will be only be evaluated 2 times weekly unless there is a clinical change. Repeat wound culture 9/28 grew Strep A and proteus. ID was consulted and recommended 5-day IV ABX with Zosyn - completed ABX 10/5. Wound care team noticed increased inflammation to the proximal part of the wound bed on 10/12.  They switched from vera flow wound VAC to regular wound VAC.  ID was consulted again on 10/14 and recommended 7-day course ABX with meropenem with anticipated stop date 10/22.        Interval Problem Update  10/28:  Weaning narcotics as tolerated.  Wound vac has been removed and has wrapping around LLE.  He is ambulating well with no acute complaints of uncontrolled pain.  He thanks me for taking care of him.     Consultants/Specialty  Infectious Disease  Psychiatry  LPS    Code Status  Full Code    Disposition  Pending guardianship and placement - likely  Group Home     Review of Systems  Review of Systems   Constitutional: Negative for fever and malaise/fatigue.   HENT: Negative.  Negative for congestion.    Eyes: Negative for blurred vision and double vision.   Respiratory: Negative for shortness of breath.    Cardiovascular: Negative for chest pain, palpitations and leg swelling.   Gastrointestinal: Negative for abdominal pain, constipation, nausea and vomiting.   Genitourinary: Negative for dysuria.   Musculoskeletal: Negative for back pain and myalgias.   Skin: Negative for itching and rash.   Neurological: Positive for weakness. Negative for dizziness and headaches.        Physical Exam  Temp:  [36.4 °C (97.6 °F)-37.1 °C (98.8 °F)] 37.1 °C (98.8 °F)  Pulse:  [65-82] 79  Resp:  [17-18] 18  BP: (106-119)/(60-75) 109/60  SpO2:  [91 %-97 %] 97 %    Physical Exam  Vitals signs and nursing note reviewed. Exam conducted with a chaperone present (1:1 sitter ).   Constitutional:       General: He is awake. He is not in acute distress.     Appearance: He is underweight. He is not ill-appearing.      Comments: Chronically ill appearing    HENT:      Head: Normocephalic and atraumatic.      Nose: Nose normal. No congestion.      Mouth/Throat:      Lips: Pink.      Mouth: Mucous membranes are moist.      Pharynx: Oropharynx is clear.   Eyes:      General:         Right eye: No discharge.         Left eye: No discharge.      Conjunctiva/sclera: Conjunctivae normal.   Neck:      Musculoskeletal: Normal range of motion. No neck rigidity.   Cardiovascular:      Pulses: Normal pulses.      Heart sounds: No murmur.   Pulmonary:      Effort: Pulmonary effort is normal. No respiratory distress.      Breath sounds: Normal breath sounds. No wheezing.   Abdominal:      General: There is no distension.      Tenderness: There is no abdominal tenderness. There is no guarding.   Musculoskeletal:      Left upper leg: He exhibits tenderness. He exhibits no swelling and no edema.      Right  lower leg: No edema.      Left lower leg: No edema.      Comments: Ambulates independently with steady gait.   Skin:     General: Skin is warm and dry.      Comments: Left leg wound - dressing c/d/i     Neurological:      Mental Status: He is alert.      Coordination: Coordination is intact.      Gait: Gait is intact. Gait normal.   Psychiatric:         Mood and Affect: Mood normal.         Speech: Speech normal.         Behavior: Behavior is not aggressive or combative. Behavior is cooperative.         Cognition and Memory: Cognition is impaired.         Judgment: Judgment is impulsive.      Comments:            Fluids    Intake/Output Summary (Last 24 hours) at 10/28/2020 0948  Last data filed at 10/27/2020 1745  Gross per 24 hour   Intake 1440 ml   Output --   Net 1440 ml       Laboratory                        Imaging  IR-MIDLINE CATHETER INSERTION WO GUIDANCE > AGE 3   Final Result                  Ultrasound-guided midline placement performed by qualified nursing staff    as above.          IR-US GUIDED PIV   Final Result    Ultrasound-guided PERIPHERAL IV INSERTION performed by    qualified nursing staff as above.      IR-MIDLINE CATHETER INSERTION WO GUIDANCE > AGE 3   Final Result                  Ultrasound-guided midline placement performed by qualified nursing staff    as above.          US-KOSTAS SINGLE LEVEL BILAT   Final Result      CT-HEAD W/O   Final Result      No acute intracranial abnormality      DX-TIBIA AND FIBULA LEFT   Final Result      1.  Healed distal metaphyseal fractures of the left fibula and tibia with tibial IM layla in place.      2.  No acute fracture or dislocation.      3.  No radiopaque soft tissue foreign body.      DX-FEMUR-2+ LEFT   Final Result      1.  No radiographic evidence of acute traumatic injury left femur.      2.  Unchanged cortical thickening in the posterior aspect of the distal femoral diaphysis which may represent sequela of prior injury or infection.      3.  No  soft tissue foreign body identified.      US-EXTREMITY VENOUS LOWER UNILAT LEFT   Final Result      DX-CHEST-PORTABLE (1 VIEW)   Final Result      No acute cardiopulmonary abnormality.           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   -Continue wound vac per wound care   -Cx 9/1 Strep, MRSA, diptheroids: completed Zyvox 9/2 thru 9/16. cx 9/28 strep A and proteus :completed course of IV zosyn which completed on 10/5.  - 7-day course Meropenum completed 10/21 per ID recs due to concerns for recurrent infection    10/24: wound healing well. No evidence of infection. Monitor     Encephalopathy- (present on admission)  Assessment & Plan  -acute on chronic, likely due Wernicke's   -Psychiatry: incapacitated to make medical decision or leave AMA   -Requires guardianship and placement  -Avoid narcotics and hypnotics.    -Frequent reorientation  -Sleep hygiene    Non-compliance  Assessment & Plan  -likely also component of psychiatric disorder and ETOH abuse  -Psychiatric consulted and suggests patient is incapacitated to make medical decisions or leave AMA  -ongoing encouragement for compliance.    Psychiatric disorder  Assessment & Plan  -unknown/unspecified  -psychiatry consulted and deemed him incapacitated to leave AMA or make medical decisions  -continue risperdol BID,  depakote 125mg TID   -Pending guardianship. Application submitted 9/3.    Flexion contracture of knee, 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)  Assessment & Plan  -secondary to chronic wound in that area  -PT OT  -encourage mobility    Acute pain  Assessment & Plan  -multifactorial: acute on chronic back pain without sciatica. No bony tenderness, no deformities. No recent trauma. Ambulatory independently at his baseline with steady gait. Main complaint is muscle spasms.  -Continue oxycodone, flexeril,  gabapentin and Lidoderm. Wean as tolerated   -hold all narcotics and muscle relaxer's for RR < 12 or altered mentation  -Encourage mobility  -heat packs    10/24: Added Toradol Q6 and decreased oxycodone frequency to Q6. Continue to wean oxy as tolerated   10/28:  Decrease PRN oxy dose to 10 mg Q6.    Emphysema/COPD (HCC)  Assessment & Plan  -chronic and stable without acute exacerbation    Pediculosis capitis  Assessment & Plan  -Lice found for 2nd time this admission.   -head shaved and shampoo ordered   -Monitor for improvement       Alcohol dependence (HCC)- (present on admission)  Assessment & Plan  -history of   -abstinent since admission    Essential hypertension- (present on admission)  Assessment & Plan  Controlled on Norvasc   Monitor VS per protocol     Protein malnutrition (HCC)  Assessment & Plan  -history of ETOH and homelessness  -moderate/severe  -Body mass index is 20.33 kg/m².  -TID supplements  -encourage PO intake    Tobacco dependence- (present on admission)  Assessment & Plan  -abstinent since admission       VTE prophylaxis: Ambulatory    I have performed a physical exam and reviewed and updated ROS and Plan today (10/28/2020). In review of previous note, there are no changes except as documented above.       LOUISE Marroquin.

## 2020-10-28 NOTE — CARE PLAN
Problem: Safety  Goal: Will remain free from falls  Outcome: PROGRESSING AS EXPECTED   1:1 sitter at bedside    Problem: Psychosocial Needs:  Goal: Level of anxiety will decrease  Outcome: PROGRESSING AS EXPECTED  Patient is communicating his needs, patient is forgetful, oriented to self and place.

## 2020-10-29 PROCEDURE — 11042 DBRDMT SUBQ TIS 1ST 20SQCM/<: CPT | Performed by: NURSE PRACTITIONER

## 2020-10-29 PROCEDURE — 700102 HCHG RX REV CODE 250 W/ 637 OVERRIDE(OP): Performed by: NURSE PRACTITIONER

## 2020-10-29 PROCEDURE — A9270 NON-COVERED ITEM OR SERVICE: HCPCS | Performed by: NURSE PRACTITIONER

## 2020-10-29 PROCEDURE — 700101 HCHG RX REV CODE 250: Performed by: NURSE PRACTITIONER

## 2020-10-29 PROCEDURE — 11045 DBRDMT SUBQ TISS EACH ADDL: CPT | Performed by: NURSE PRACTITIONER

## 2020-10-29 PROCEDURE — 770021 HCHG ROOM/CARE - ISO PRIVATE

## 2020-10-29 PROCEDURE — 29581 APPL MULTLAYER CMPRN SYS LEG: CPT

## 2020-10-29 RX ADMIN — CYCLOBENZAPRINE 10 MG: 10 TABLET, FILM COATED ORAL at 13:01

## 2020-10-29 RX ADMIN — AMLODIPINE BESYLATE 5 MG: 5 TABLET ORAL at 05:25

## 2020-10-29 RX ADMIN — GABAPENTIN 200 MG: 100 CAPSULE ORAL at 17:15

## 2020-10-29 RX ADMIN — OXYCODONE HYDROCHLORIDE 10 MG: 10 TABLET ORAL at 08:48

## 2020-10-29 RX ADMIN — DIVALPROEX SODIUM 125 MG: 125 CAPSULE ORAL at 22:01

## 2020-10-29 RX ADMIN — HYDROXYZINE HYDROCHLORIDE 25 MG: 50 TABLET, FILM COATED ORAL at 22:01

## 2020-10-29 RX ADMIN — RISPERIDONE 0.5 MG: 0.5 TABLET ORAL at 05:25

## 2020-10-29 RX ADMIN — LIDOCAINE 1 PATCH: 50 PATCH CUTANEOUS at 13:03

## 2020-10-29 RX ADMIN — OXYCODONE HYDROCHLORIDE 10 MG: 10 TABLET ORAL at 17:15

## 2020-10-29 RX ADMIN — DIVALPROEX SODIUM 125 MG: 125 CAPSULE ORAL at 13:02

## 2020-10-29 RX ADMIN — RISPERIDONE 1 MG: 1 TABLET ORAL at 17:16

## 2020-10-29 RX ADMIN — GABAPENTIN 200 MG: 100 CAPSULE ORAL at 13:02

## 2020-10-29 RX ADMIN — DIVALPROEX SODIUM 125 MG: 125 CAPSULE ORAL at 05:25

## 2020-10-29 RX ADMIN — Medication 400 MG: at 17:15

## 2020-10-29 RX ADMIN — Medication 400 MG: at 05:25

## 2020-10-29 RX ADMIN — GABAPENTIN 200 MG: 100 CAPSULE ORAL at 05:25

## 2020-10-29 ASSESSMENT — PAIN DESCRIPTION - PAIN TYPE
TYPE: CHRONIC PAIN
TYPE: CHRONIC PAIN

## 2020-10-29 NOTE — CARE PLAN
Problem: Pain Management  Goal: Pain level will decrease to patient's comfort goal  Outcome: PROGRESSING AS EXPECTED  Note: Taught pt 0-10 pain scale and  non pharmacological method of pain mgt, encouraged to verbalize when in pain. Administered PRN pain med as needed.      Problem: Safety  Goal: Will remain free from injury  Reactivated  Goal: Will remain free from falls  Outcome: PROGRESSING AS EXPECTED  Note:  Non-skid socks. Bed locked & in low position. Personal belongings and call light  within reach.1:1 sitter at bedside.

## 2020-10-29 NOTE — WOUND TEAM
[facility]  Inpatient Services   Wound and Skin Care Progress Note    Admission Date: 8/7/2020     Last order of IP CONSULT TO WOUND CARE was found on 9/1/2020 from Hospital Encounter on 8/7/2020     HPI, PMH, SH: Reviewed    Unit where seen by Wound Team: [id number]     WOUND CONSULT/FOLLOW UP RELATED TO:  Left leg     Self Report / Pain Level: none reported    OBJECTIVE:  2 layer intact, sacral mepilex on left LE peeling off.     WOUND TYPE, LOCATION, CHARACTERISTICS (Pressure Injuries: location, stage, POA or date identified)     Wound 08/07/20 Full Thickness Wound Leg LLE posterior, extends medially and laterally (Active)   Wound Image      10/29/20 1100   Site Assessment Red;White 10/29/20 1100   Periwound Assessment Clean;Dry;Intact 10/29/20 1100   Margins Defined edges;Attached edges 10/29/20 1100   Closure Secondary intention 10/29/20 1100   Drainage Amount Large 10/29/20 1100   Drainage Description Serosanguineous 10/29/20 1100   Treatments Cleansed;CSWD - Conservative Sharp Wound Debridement;Compression;Irrigation;Site care 10/29/20 1100   Wound Cleansing Approved Wound Cleanser 10/29/20 1100   Periwound Protectant Not Applicable 10/29/20 1100   Dressing Cleansing/Solutions Not Applicable 10/29/20 1100   Dressing Options Collagen Dressing;Hydrofiber Silver;Absorbent Abdominal Pad;Compression Wrap Two Layer;Mepilex 10/29/20 1100   Dressing Changed Changed 10/29/20 1100   Dressing Status Clean;Dry;Intact 10/29/20 1100   Dressing Change/Treatment Frequency By Wound Team Only 10/29/20 1100   NEXT Dressing Change/Treatment Date 11/02/20 10/29/20 1100   NEXT Weekly Photo (Inpatient Only) 11/05/20 10/29/20 1100   Non-staged Wound Description Full thickness 10/29/20 1100   Wound Length (cm) 30 cm 10/29/20 1100   Wound Width (cm) 13.5 cm 10/29/20 1100   Wound Depth (cm) 0.2 cm 10/29/20 1100   Wound Surface Area (cm^2) 405 cm^2 10/29/20 1100   Wound Volume (cm^3) 81 cm^3 10/29/20 1100   Post-Procedure  Length (cm) 30 cm 10/29/20 1100   Post-Procedure Width (cm) 14 cm 10/29/20 1100   Post-Procedure Depth (cm) 0.4 cm 10/29/20 1100   Post-Procedure Surface Area (cm^2) 420 cm^2 10/29/20 1100   Post-Procedure Volume (cm^3) 168 cm^3 10/29/20 1100   Wound Healing % -54 10/29/20 1100   Wound Bed Granulation (%) 80 % 10/23/20 1800   Wound Bed Epithelium (%) 0 % 20 1300   Wound Bed Slough (%) 20 % 10/23/20 1800   Wound Bed Granulation (%) - Post-Procedure 100 % 10/29/20 1100   Wound Bed Epithelium % - (Post-Procedure) 0 % 20   Wound Bed Slough % - (Post-Procedure) 0 % 10/07/20 1100   Wound Bed Eschar % - (Post-Procedure) 0 % 20 1300   Tunneling (cm) 0 cm 20 1300   Undermining (cm) 0 cm 20 1300   Shape Irregular 10/29/20 1100   Wound Odor Mild 10/29/20 1100   Pulses Left;2+;DP;PT 10/29/20 1100   Exposed Structures None 10/29/20 1100   WOUND NURSE ONLY - Time Spent with Patient (mins) 60 10/29/20 1100                 VASCULAR    CESAR:   CESAR Results, Last 30 Days Us-cesar Single Level Bilat    Result Date: 2020  Narrative  Vascular Laboratory  Conclusions  1.  Normal bilateral CESAR's.  GRUPO GANN  Age:    64    Gender:     M  MRN:    [id number]  :    1955      BSA:  Exam Date:     2020 09:59  Room #:     Inpatient  Priority:     Routine  Ht (in):             Wt (lb):  Ordering Physician:        EDDA CANADA  Referring Physician:       [id number] Morrissey  Sonographer:               Deja Ellis RDMS                             T  Study Type:                Complete Bilateral  Technical Quality:         Adequate  Indications:     Ulceration of LE  CPT Codes:       82500  ICD Codes:       707.1  History:         Nonhealing wound of left lower extremity.  Limitations:                 RIGHT  Waveform            Systolic BPs (mmHg)                             117           Brachial  Triphasic                                 Common Femoral  Triphasic                  138           Posterior Tibial  Triphasic                  132           Dorsalis Pedis                                           Peroneal                             1.18          KOSTAS                                           TBI                       LEFT  Waveform        Systolic BPs (mmHg)                             114           Brachial  Triphasic                                Common Femoral  Triphasic                  127           Posterior Tibial  Triphasic                  122           Dorsalis Pedis                                           Peroneal                             1.09          KOSTAS                                           TBI  Findings  Right.  Doppler waveforms of the common femoral artery are of high amplitude and  triphasic.  Doppler waveforms at the ankle are brisk and triphasic.  Ankle-brachial index is normal.  Left.  Doppler waveforms of the common femoral artery are of high amplitude and  triphasic.  Doppler waveforms at the ankle are brisk and triphasic.  Ankle-brachial index is normal.  Unable to obtained popliteal waveforms due to wound bandages.  Additional testing was not performed in accordance with lower extremity  arterial evaluation protocol.  Clover Maya  (Electronically Signed)  Final Date:      02 September 2020                   11:14    Lab Values:    Lab Results   Component Value Date/Time    WBC 6.0 10/14/2020 11:43 AM    RBC 3.87 (L) 10/14/2020 11:43 AM    HEMOGLOBIN 11.0 (L) 10/14/2020 11:43 AM    HEMATOCRIT 33.9 (L) 10/14/2020 11:43 AM    CREACTPROT 1.07 (H) 08/12/2020 01:55 PM    SEDRATEWES 85 (H) 08/07/2020 01:20 PM    HBA1C 5.7 (H) 11/09/2018 06:30 PM        Culture Results show:  Recent Results (from the past 720 hour(s))   CULTURE WOUND W/ GRAM STAIN    Collection Time: 09/01/20  2:00 PM    Specimen: Left Leg; Wound   Result Value Ref Range    Significant Indicator POS (POS)     Source WND      Site LEFT LEG     Culture Result - (A)     Gram Stain Result       Moderate Gram positive cocci.  Moderate Gram positive rods.      Culture Result (A)      Beta Hemolytic Streptococcus group A  Moderate growth      Culture Result (A)      Methicillin Resistant Staphylococcus aureus  Moderate growth      Culture Result (A)      Diphtheroids  Moderate growth  Mixed morphologies.         INTERVENTIONS BY WOUND TEAM:    Dressings removed and wound and addison wound cleansed with NS and wound cleanser, patted dry with gauze.  Lidocaine gel dwell time of 15 minutes .  Excisional and selective debridement by Asaf ELLER with curette to remove ~400cm2 of slough and scar tissue.  Wound cleanser to cleanse again.  Collagen dressing onto wound beds allowing pt's wound drainage to activate product, covered with Aquacel Ag then ABD to leg aspect and mepilex to thigh aspect of this long wound.  2 layer wrap to left LE.       Interdisciplinary consultation: Patient, Bedside RN (Karen)    EVALUATION / RATIONALE FOR TREATMENT:    10/29: Excisional debridement by Asaf ELLER of scar tissue along the proximal edge of the posterior knee and lateral thigh.  Lateral aspect of thigh is flatten.  Medial aspect of knee has thick scar tissue that was excisionally debrided by Asaf ELLER.  Fully granulated throughout the wound bed.   10/23 wound bed mostly granular, superficial in depth, progress has been slow with the wound VAC so will trial collagen product to encourage new tissue growth.    10/19: wound bed has improved in color and is fully granulated.  Addison-wound has improved, denudation has resolved. APRN continuing with serial weekly debridements as needed.   10/16: wound friable pt now on iv abx per ID.  Indurated edges addressed with drape and foam.  Addison wound likely has yeast infection - addressing with silver powder crusting  10/14: patient seen with Asaf ELLER, stopping veraflo instillation.  Starting  silver powder and regular wound VAC to see if it will help with bioburden.  Possible colonization of bacteria.  ID involved.   10/12: Inflammation noted to the proximal part of the wound bed.  Will continue to monitor, possible vac break on Wednesday to help addison-skin inflammation.   10/7: Excisional debridement performed by Asaf ELLER. Will need to continue selective debridement with NPWT changes, and weekly excisional debridement with APRN to flatten out the scar tissue.  IV abx therapy ended 10/5.   10/5: Lateral wound still with some slough, both wounds smaller per measurements. Medial wound with all granular tissue.  9/30: Patient to start abx therapy for 7 days course per Dr. Ellis.  Started dakin's instillation to veraflo  9/28: malodorous today, culture taken.    9/25: Odor noted, though unclear if from wound or pt, consider shower before next Vac change. Consider regular vac next change, wound granular and superficial.   9/23: Patient still awaiting guardianship for placement.   9/18: wound granulating nicely and responding well to current treatment.    9/16: Wound bed with granular tissue, edges are thick but appear better than previous assessments. . NPWT VF to encourage closure by secondary intention and manage drainage while encouraging oxygenation and granulation to the wound bed. 2 layer compression to assist in decrease of swelling and encourage blood flow to wounds. Wound team to follow up and change 3x/week.      Goals: Steady decrease in wound area and depth weekly.    NURSING PLAN OF CARE ORDERS (X):    Dressing changes: See Dressing Care orders: X  Skin care: See Skin Care orders:   Rectal tube care: See Rectal Tube Care orders:   Other orders:      WOUND TEAM PLAN OF CARE:   Dressing changes by wound team:     X, 2 layer compression wrap 2x/weekly due to drainage.   Follow up 3 times weekly:                NPWT change 3 times weekly:    Follow up 1-2 times weekly:      Follow up  Bi-Monthly:                   Follow up as needed:       Other (explain):     Anticipated discharge plans: pending guardianship.   LTACH:        SNF/Rehab: X - patient will need ongoing wound care for LLE upon discharge - 2x/weekly           Home Health Care:           Outpatient Wound Center:            Self Care:

## 2020-10-29 NOTE — CARE PLAN
Problem: Safety  Goal: Will remain free from falls  Outcome: PROGRESSING AS EXPECTED     Problem: Infection  Goal: Will remain free from infection  Outcome: PROGRESSING AS EXPECTED     Problem: Bowel/Gastric:  Goal: Normal bowel function is maintained or improved  Outcome: PROGRESSING AS EXPECTED  Goal: Will not experience complications related to bowel motility  Outcome: PROGRESSING AS EXPECTED

## 2020-10-29 NOTE — PROGRESS NOTES
"WOUND CARE PROVIDER PROGRESS NOTE            HPI: 65-year-old male homelessness, EtOH use, tobacco dependence, chronic left lower extremity wound admitted 8/7/2020 with left lower extremity wound infection.  Patient was recently hospitalized at [facility] in April 2020, evaluated by orthopedic surgery who recommended wound care.  Wound culture grew MSSA and strep.  Patient was recently seen at [facility] prior to this admission was given a prescription for antibiotics but did not fill this.  Patient reports that he has had this wound for 8 to 10 years.  He reports that he fell and went \"ass over tea kettle\".  He reports that he would clean the wound almost daily with soap and water at the homeless shelter.  Per chart review, patient reported he developed the ulcer in May 2018 when he fell while hiking.  Patient was seen at wound care clinic in August 2018.  Patient had a  assisting him while he was living at [facility] care housing.  Wound VAC /was ordered however  had concerns with patient's compliancy and/ access to medical care.  Skilled nursing facility was contacted to have patient be admitted, however he was not accepted due to Medicaid.    Patient is on Lovenox 40 mg daily.  Refused today however he took it yesterday.  Allergies reviewed.  He denies any allergies.      Interval hx:   9/11/2020: pt calm cooperative. On zyvox. Seen during VAC and two layer compression wrap change. Pt tolerating VAC and wraps. Wound decreased in size.   9/18/2020: Patient denies any fevers, chills, nausea, vomiting.  He is tolerating the veraflo wound VAC and 2 layer compression wrap.  Wound is decreasing in size.  Increased granular tissue noted, wound edges have improved however he does have rolled edges to popliteal fossa area.  Patient calm and cooperative, watching sports.  9/30/2020: Denies fevers, chills, nausea, vomiting.  Tolerating wound VAC and 2 layer compression wrap.  Refused nail care.  Wound " culture positive for strep A and Proteus.  10/7/2020: completed 5 day course of zyvox. Denies fevers chills nausea vomiting.  Seen during veraflo VAC and 2 layer compression wrap change.    10/14/2020: Patient denies fevers, chills, nausea, vomiting.  Patient wound is malodorous complaining of increased pain to the wound.  Increased slough noted to wound bed despite veraflo wound VAC.  Reconsult ID.  10/16/2020: Patient seen during wound VAC change with Miranda wound care PT. patient denies fever, chills, nausea, vomiting.  Patient reports pain to wound and increase in pain with wound VAC change.  Patient has granular tissue throughout wound with mild amount of slough to posterior aspect of leg.  Malodorous with VAC removal.  Wound VAC nursing changed.  10/29/2020: Wound VAC was discontinued by wound team on 10/23/2020 due to slow progress and collagen was started.  Patient has completed 7-day antibiotic course of meropenem for multidrug resistant Proteus vulgaris.  Patient found to have lice and has been treated.  Nonfoul-smelling odor present with dressing removal.          Results:  CBC with differential 10/14/2020: WBC 6, H&H 11/33.9  Wound culture: 9/28/2020: Positive for beta-hemolytic strep group A, Proteus.  Sensitivities pending    Wound cx: 9/1/2020 mrsa, diphtheroids, beta hemolytic strep group A    8/7/2020: X-ray tib-fib left:  1.  Healed distal metaphyseal fractures of the left fibula and tibia with tibial IM layla in place.     2.  No acute fracture or dislocation.     3.  No radiopaque soft tissue foreign body.      Arterial studies:   9/2/2020  Right.    Doppler waveforms of the common femoral artery are of high amplitude and    triphasic.    Doppler waveforms at the ankle are brisk and triphasic.    Ankle-brachial index is normal.       Left.    Doppler waveforms of the common femoral artery are of high amplitude and    triphasic.    Doppler waveforms at the ankle are brisk and triphasic.     Ankle-brachial index is normal.    Unable to obtained popliteal waveforms due to wound bandages.        Exam:  Left lower leg full-thickness ulcer  Mild non-foul-smelling odor  Minimal slough primarily to lateral aspect of wound, otherwise red granular tissue.  Tenderness improved   Distal part of calf where there was previous maceration has resolved  Thick closed wound edges to medial aspect of wound  Palpable PT pulse to left foot +1 foot   +2 DP left foot  Difficulty palpating popliteal due to scar tissue  No erythema  Able to extend left leg to 160 degrees. Able to flex left leg around 80 degrees        Pre-debridement left leg medial            Pre-debridement left lateral lower leg                        Pre-debridement measurements: 30 x 13.5 x 0.2 cm    DESCRIPTION OF PROCEDURE:  Excision of skin, subcutaneous tissue including chronic rolled wound edges to left lower leg ulcer     PMH, medications and recent labs reviewed        PROCEDURE: A formal timeout was performed to confirm patient's correct name, correct site, correct procedure and correct laterality.    Topical lidocaine applied topically and allowed to dwell for approximately 15 minutes prior to start of procedure.  -Curette  used to excise closed wound edges, slough and scar tissue from the wound bed.  Total area debrided, ~400 cm².  Debridement carried into the subcutaneous tissue layer.   -Bleeding controlled with manual pressure.    Wound care completed by wound RN- refer to flowsheet and note  -Debridement limited by patient's pain tolerance primarily to proximal aspect of wound.       Post debridement measurements: 30 x 13.5 x 0.4 cm     Post debridement left lower leg medial posterior post debride                Left leg posterior lateral post debride                                            ASSESSMENT/PLAN:  65-year-old male with homelessness, EtOH use, tobacco use, and chronic left leg ulcer.  Closed wound edges have significantly  improved, wound now starting to contract on the edges primarily to the lateral aspect of wound.  There is still significant scar tissue with closed wound edge to the medial aspect of the wound which was excisionally debrided in addition to removal of slough to wound bed.  Excisional debridement medically necessary for wound healing.  Non foul-smelling wound odor present with dressing removal.  Most likely related to heavy drainage, biofilm.  Dressing changed to Aquacel Ag for increased absorbency of drainage and to reduce bioburden.      -Vanessa, Aquacel Ag, 2 layer compression wrap.  Change 2 times a week by wound team  -No signs and symptoms of infection present today except for mild nonfoul smelling odor.  We will continue to monitor.  Completed antibiotic course on 10/22/2020.     - Patient to continue to be seen by wound care team while admitted  Patient to continue to be followed by wound care nurse practitioner as he requires serial excisional debridements        Discharge plan:  Pending guardianship      D/W: Patient, RN, Sera wound care RN,

## 2020-10-30 PROCEDURE — 700101 HCHG RX REV CODE 250: Performed by: NURSE PRACTITIONER

## 2020-10-30 PROCEDURE — A9270 NON-COVERED ITEM OR SERVICE: HCPCS | Performed by: NURSE PRACTITIONER

## 2020-10-30 PROCEDURE — 99231 SBSQ HOSP IP/OBS SF/LOW 25: CPT | Performed by: NURSE PRACTITIONER

## 2020-10-30 PROCEDURE — 770001 HCHG ROOM/CARE - MED/SURG/GYN PRIV*

## 2020-10-30 PROCEDURE — 700102 HCHG RX REV CODE 250 W/ 637 OVERRIDE(OP): Performed by: NURSE PRACTITIONER

## 2020-10-30 RX ADMIN — OXYCODONE HYDROCHLORIDE 10 MG: 10 TABLET ORAL at 16:39

## 2020-10-30 RX ADMIN — RISPERIDONE 1 MG: 1 TABLET ORAL at 16:39

## 2020-10-30 RX ADMIN — GABAPENTIN 200 MG: 100 CAPSULE ORAL at 16:39

## 2020-10-30 RX ADMIN — LIDOCAINE 1 PATCH: 50 PATCH CUTANEOUS at 13:54

## 2020-10-30 RX ADMIN — CYCLOBENZAPRINE 10 MG: 10 TABLET, FILM COATED ORAL at 22:57

## 2020-10-30 RX ADMIN — GABAPENTIN 200 MG: 100 CAPSULE ORAL at 05:44

## 2020-10-30 RX ADMIN — GABAPENTIN 200 MG: 100 CAPSULE ORAL at 13:54

## 2020-10-30 RX ADMIN — DIVALPROEX SODIUM 125 MG: 125 CAPSULE ORAL at 05:45

## 2020-10-30 RX ADMIN — DIVALPROEX SODIUM 125 MG: 125 CAPSULE ORAL at 23:00

## 2020-10-30 RX ADMIN — DIVALPROEX SODIUM 125 MG: 125 CAPSULE ORAL at 13:54

## 2020-10-30 RX ADMIN — RISPERIDONE 0.5 MG: 0.5 TABLET ORAL at 05:45

## 2020-10-30 RX ADMIN — Medication 400 MG: at 05:44

## 2020-10-30 RX ADMIN — Medication 400 MG: at 16:39

## 2020-10-30 RX ADMIN — OXYCODONE HYDROCHLORIDE 10 MG: 10 TABLET ORAL at 22:58

## 2020-10-30 RX ADMIN — AMLODIPINE BESYLATE 5 MG: 5 TABLET ORAL at 05:45

## 2020-10-30 ASSESSMENT — ENCOUNTER SYMPTOMS
COUGH: 0
DIZZINESS: 0
SENSORY CHANGE: 0
CHILLS: 0
CONSTIPATION: 0
BLURRED VISION: 0
DOUBLE VISION: 0
HEADACHES: 0
EYE DISCHARGE: 0
VOMITING: 0
FEVER: 0
NAUSEA: 0
ABDOMINAL PAIN: 0
SORE THROAT: 0
WEAKNESS: 1
MYALGIAS: 0
SHORTNESS OF BREATH: 0
DIARRHEA: 0

## 2020-10-30 NOTE — PROGRESS NOTES
Hospital Medicine Twice Weekly Progress Note    Date of Service  10/30/2020    Chief Complaint  LLE Wound    Hospital Course   Mr. Varela is a 64-year-old male with PMH significant for homelessness, etoh use, tobacco dependence and chronic LLE wound who presented 8/7/2020 with LLE wound infection.  He was hospitalized at our facility in April and evaluated by orthopedic surgery who recommended wound care.  Wound cultures at that time grew MSSA and strep. Patient reported losing his Medicaid card and having no transportation to wound clinic.  He was seen at [facility] earlier this week and prescribed ABX, however he has not filled the prescription.  US LLE negative for DVT.  Treated for sepsis with empiric ABX and wound care. He completed ABX course 9/16. He was found to have head lice and underwent treatments. Continued to have intermittent agitation so was started on risperdol, depakote and gabapentin per psych recs.  He has been deemed incapacitated to make medical decisions and is currently pending guardianship and placement.  He is medically stable and will be only be evaluated 2 times weekly unless there is a clinical change. Repeat wound culture 9/28 grew Strep A and proteus. ID was consulted and recommended 5-day IV ABX with Zosyn - completed ABX 10/5. Wound care team noticed increased inflammation to the proximal part of the wound bed on 10/12.  They switched from vera flow wound VAC to regular wound VAC.  ID was consulted again on 10/14 and recommended 7-day course ABX with meropenem with anticipated stop date 10/22.        Interval Problem Update  10/28:  Weaning narcotics as tolerated.  Wound vac has been removed and has wrapping around LLE.  He is ambulating well with no acute complaints of uncontrolled pain.  He thanks me for taking care of him.   10/30:  Lying in bed, sleeping, on initial evaluation.  Awakens easily.  Denies acute pain.  He asks when he will be able to get out of the hospital  "and \"go to the bar, play some people, and meet some women\".  Explained we have to treat his leg before getting out of the hospital.     Consultants/Specialty  Infectious Disease  Psychiatry  LPS    Code Status  Full Code    Disposition  Pending guardianship and placement - likely Group Home     Review of Systems  Review of Systems   Constitutional: Negative for chills, fever and malaise/fatigue.   HENT: Negative.  Negative for congestion and sore throat.    Eyes: Negative for blurred vision, double vision and discharge.   Respiratory: Negative for cough and shortness of breath.    Cardiovascular: Negative for chest pain and leg swelling.   Gastrointestinal: Negative for abdominal pain, constipation, diarrhea, nausea and vomiting.   Genitourinary: Negative for dysuria and hematuria.   Musculoskeletal: Negative for myalgias.   Skin: Negative for rash.   Neurological: Positive for weakness. Negative for dizziness, sensory change and headaches.        Physical Exam  Temp:  [36.3 °C (97.4 °F)-36.7 °C (98.1 °F)] 36.7 °C (98.1 °F)  Pulse:  [77-81] 81  Resp:  [16-18] 18  BP: (113-129)/(70-77) 113/70  SpO2:  [94 %-98 %] 94 %    Physical Exam  Vitals signs and nursing note reviewed. Exam conducted with a chaperone present (1:1 sitter ).   Constitutional:       General: He is awake. He is not in acute distress.     Appearance: He is underweight. He is not toxic-appearing.      Comments: Chronically ill appearing    HENT:      Head: Normocephalic and atraumatic.      Nose: Nose normal.      Mouth/Throat:      Lips: Pink.      Mouth: Mucous membranes are moist.      Pharynx: Oropharynx is clear. No oropharyngeal exudate.   Eyes:      General: No scleral icterus.     Conjunctiva/sclera: Conjunctivae normal.   Neck:      Musculoskeletal: Normal range of motion. No neck rigidity or muscular tenderness.   Cardiovascular:      Pulses: Normal pulses.      Heart sounds: Normal heart sounds. No murmur.   Pulmonary:      Effort: " Pulmonary effort is normal. No respiratory distress.      Breath sounds: Normal breath sounds. No wheezing or rales.   Abdominal:      General: There is no distension.      Tenderness: There is no abdominal tenderness.   Musculoskeletal:      Left upper leg: He exhibits tenderness. He exhibits no swelling and no edema.      Right lower leg: No edema.      Left lower leg: No edema.      Comments: Ambulates independently with steady gait.   Skin:     General: Skin is warm and dry.      Coloration: Skin is not jaundiced or pale.      Comments: Left leg wound - dressing c/d/i     Neurological:      Mental Status: He is alert.      Coordination: Coordination is intact.      Gait: Gait is intact. Gait normal.   Psychiatric:         Mood and Affect: Mood normal.         Speech: Speech normal.         Behavior: Behavior is not aggressive or combative. Behavior is cooperative.         Cognition and Memory: Cognition is impaired.         Judgment: Judgment is impulsive.      Comments:            Fluids    Intake/Output Summary (Last 24 hours) at 10/30/2020 0928  Last data filed at 10/30/2020 0900  Gross per 24 hour   Intake 1640 ml   Output --   Net 1640 ml       Laboratory      Recent Labs     10/28/20  1143   SODIUM 133*   POTASSIUM 3.9   CHLORIDE 97   CO2 29   GLUCOSE 77   BUN 19   CREATININE 0.82   CALCIUM 9.4                   Imaging  IR-MIDLINE CATHETER INSERTION WO GUIDANCE > AGE 3   Final Result                  Ultrasound-guided midline placement performed by qualified nursing staff    as above.          IR-US GUIDED PIV   Final Result    Ultrasound-guided PERIPHERAL IV INSERTION performed by    qualified nursing staff as above.      IR-MIDLINE CATHETER INSERTION WO GUIDANCE > AGE 3   Final Result                  Ultrasound-guided midline placement performed by qualified nursing staff    as above.          US-KOSTAS SINGLE LEVEL BILAT   Final Result      CT-HEAD W/O   Final Result      No acute intracranial  abnormality      DX-TIBIA AND FIBULA LEFT   Final Result      1.  Healed distal metaphyseal fractures of the left fibula and tibia with tibial IM layla in place.      2.  No acute fracture or dislocation.      3.  No radiopaque soft tissue foreign body.      DX-FEMUR-2+ LEFT   Final Result      1.  No radiographic evidence of acute traumatic injury left femur.      2.  Unchanged cortical thickening in the posterior aspect of the distal femoral diaphysis which may represent sequela of prior injury or infection.      3.  No soft tissue foreign body identified.      US-EXTREMITY VENOUS LOWER UNILAT LEFT   Final Result      DX-CHEST-PORTABLE (1 VIEW)   Final Result      No acute cardiopulmonary abnormality.           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   -Continue wound vac per wound care   -Cx 9/1 Strep, MRSA, diptheroids: completed Zyvox 9/2 thru 9/16. cx 9/28 strep A and proteus :completed course of IV zosyn which completed on 10/5.  - 7-day course Meropenum completed 10/21 per ID recs due to concerns for recurrent infection    10/24: wound healing well. No evidence of infection. Monitor   10/30:  Wound vac discontinued.  Continue wrapping to LLE and serial debridements per wound team.     Encephalopathy- (present on admission)  Assessment & Plan  -acute on chronic, likely due Wernicke's   -Psychiatry: incapacitated to make medical decision or leave AMA   -Requires guardianship and placement  -Avoid narcotics and hypnotics.    -Frequent reorientation  -Sleep hygiene    Non-compliance  Assessment & Plan  -likely also component of psychiatric disorder and ETOH abuse  -Psychiatric consulted and suggests patient is incapacitated to make medical decisions or leave AMA  -ongoing encouragement for compliance.    Psychiatric disorder  Assessment &  Plan  -unknown/unspecified  -psychiatry consulted and deemed him incapacitated to leave AMA or make medical decisions  -continue risperdol BID,  depakote 125mg TID   -Pending guardianship. Application submitted 9/3.    Flexion contracture of knee, 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)  Assessment & Plan  -secondary to chronic wound in that area  -PT OT  -encourage mobility    Acute pain  Assessment & Plan  -multifactorial: acute on chronic back pain without sciatica. No bony tenderness, no deformities. No recent trauma. Ambulatory independently at his baseline with steady gait. Main complaint is muscle spasms.  -Continue oxycodone, flexeril, gabapentin and Lidoderm. Wean as tolerated   -hold all narcotics and muscle relaxer's for RR < 12 or altered mentation  -Encourage mobility  -heat packs    10/24: Added Toradol Q6 and decreased oxycodone frequency to Q6. Continue to wean oxy as tolerated   10/28:  Decrease PRN oxy dose to 10 mg Q6.    Emphysema/COPD (HCC)  Assessment & Plan  -chronic and stable without acute exacerbation    Pediculosis capitis  Assessment & Plan  -Lice found for 2nd time this admission.   -head shaved and shampoo ordered   -Monitor for improvement       Alcohol dependence (HCC)- (present on admission)  Assessment & Plan  -history of   -abstinent since admission    Essential hypertension- (present on admission)  Assessment & Plan  Controlled on Norvasc   Monitor VS per protocol     Protein malnutrition (HCC)  Assessment & Plan  -history of ETOH and homelessness  -moderate/severe  -Body mass index is 20.33 kg/m².  -TID supplements  -encourage PO intake    Tobacco dependence- (present on admission)  Assessment & Plan  -abstinent since admission       VTE prophylaxis: Ambulatory    I have performed a physical exam and reviewed and updated ROS and Plan today (10/30/2020). In review of previous note, there are no changes except as documented above.       LOUISE Marroquin.

## 2020-10-30 NOTE — CARE PLAN
Problem: Safety  Goal: Will remain free from injury  Outcome: PROGRESSING AS EXPECTED     Problem: Infection  Goal: Will remain free from infection  Outcome: PROGRESSING AS EXPECTED     Problem: Knowledge Deficit  Goal: Knowledge of the prescribed therapeutic regimen will improve  Outcome: PROGRESSING AS EXPECTED

## 2020-10-31 PROCEDURE — 700102 HCHG RX REV CODE 250 W/ 637 OVERRIDE(OP): Performed by: NURSE PRACTITIONER

## 2020-10-31 PROCEDURE — A9270 NON-COVERED ITEM OR SERVICE: HCPCS | Performed by: NURSE PRACTITIONER

## 2020-10-31 PROCEDURE — 700101 HCHG RX REV CODE 250: Performed by: NURSE PRACTITIONER

## 2020-10-31 PROCEDURE — 770001 HCHG ROOM/CARE - MED/SURG/GYN PRIV*

## 2020-10-31 RX ADMIN — OXYCODONE HYDROCHLORIDE 10 MG: 10 TABLET ORAL at 12:51

## 2020-10-31 RX ADMIN — DIVALPROEX SODIUM 125 MG: 125 CAPSULE ORAL at 14:23

## 2020-10-31 RX ADMIN — OXYCODONE HYDROCHLORIDE 10 MG: 10 TABLET ORAL at 06:39

## 2020-10-31 RX ADMIN — OXYCODONE HYDROCHLORIDE 10 MG: 10 TABLET ORAL at 19:36

## 2020-10-31 RX ADMIN — GABAPENTIN 200 MG: 100 CAPSULE ORAL at 17:40

## 2020-10-31 RX ADMIN — DIVALPROEX SODIUM 125 MG: 125 CAPSULE ORAL at 21:15

## 2020-10-31 RX ADMIN — RISPERIDONE 0.5 MG: 0.5 TABLET ORAL at 06:39

## 2020-10-31 RX ADMIN — HYDROXYZINE HYDROCHLORIDE 25 MG: 50 TABLET, FILM COATED ORAL at 19:36

## 2020-10-31 RX ADMIN — DIVALPROEX SODIUM 125 MG: 125 CAPSULE ORAL at 06:39

## 2020-10-31 RX ADMIN — Medication 400 MG: at 06:39

## 2020-10-31 RX ADMIN — Medication 400 MG: at 17:41

## 2020-10-31 RX ADMIN — AMLODIPINE BESYLATE 5 MG: 5 TABLET ORAL at 06:39

## 2020-10-31 RX ADMIN — GABAPENTIN 200 MG: 100 CAPSULE ORAL at 12:46

## 2020-10-31 RX ADMIN — RISPERIDONE 1 MG: 1 TABLET ORAL at 17:41

## 2020-10-31 RX ADMIN — GABAPENTIN 200 MG: 100 CAPSULE ORAL at 06:39

## 2020-10-31 RX ADMIN — LIDOCAINE 1 PATCH: 50 PATCH CUTANEOUS at 12:46

## 2020-10-31 ASSESSMENT — PAIN DESCRIPTION - PAIN TYPE
TYPE: ACUTE PAIN

## 2020-10-31 NOTE — CARE PLAN
Problem: Safety  Goal: Will remain free from injury  Outcome: PROGRESSING AS EXPECTED  Goal: Will remain free from falls  Outcome: PROGRESSING AS EXPECTED  Note: Steady gait, 1:1sitter at bedside.     Problem: Bowel/Gastric:  Goal: Normal bowel function is maintained or improved  Outcome: PROGRESSING AS EXPECTED  Note: Normoactive bowel tones auscultated     Problem: Pain Management  Goal: Pain level will decrease to patient's comfort goal  Outcome: PROGRESSING AS EXPECTED  Note: Prn pain meds given with good effect, pt fell asleep.

## 2020-11-01 PROCEDURE — 770001 HCHG ROOM/CARE - MED/SURG/GYN PRIV*

## 2020-11-01 PROCEDURE — A9270 NON-COVERED ITEM OR SERVICE: HCPCS | Performed by: NURSE PRACTITIONER

## 2020-11-01 PROCEDURE — 700102 HCHG RX REV CODE 250 W/ 637 OVERRIDE(OP): Performed by: NURSE PRACTITIONER

## 2020-11-01 PROCEDURE — 700101 HCHG RX REV CODE 250: Performed by: NURSE PRACTITIONER

## 2020-11-01 RX ADMIN — GABAPENTIN 200 MG: 100 CAPSULE ORAL at 04:45

## 2020-11-01 RX ADMIN — DIVALPROEX SODIUM 125 MG: 125 CAPSULE ORAL at 21:56

## 2020-11-01 RX ADMIN — LIDOCAINE 1 PATCH: 50 PATCH CUTANEOUS at 12:17

## 2020-11-01 RX ADMIN — Medication 400 MG: at 04:45

## 2020-11-01 RX ADMIN — OXYCODONE HYDROCHLORIDE 10 MG: 10 TABLET ORAL at 12:17

## 2020-11-01 RX ADMIN — RISPERIDONE 0.5 MG: 0.5 TABLET ORAL at 04:45

## 2020-11-01 RX ADMIN — OXYCODONE HYDROCHLORIDE 10 MG: 10 TABLET ORAL at 19:24

## 2020-11-01 RX ADMIN — GABAPENTIN 200 MG: 100 CAPSULE ORAL at 17:12

## 2020-11-01 RX ADMIN — DIVALPROEX SODIUM 125 MG: 125 CAPSULE ORAL at 17:13

## 2020-11-01 RX ADMIN — OXYCODONE HYDROCHLORIDE 10 MG: 10 TABLET ORAL at 04:45

## 2020-11-01 RX ADMIN — DIVALPROEX SODIUM 125 MG: 125 CAPSULE ORAL at 04:45

## 2020-11-01 RX ADMIN — HYDROXYZINE HYDROCHLORIDE 25 MG: 50 TABLET, FILM COATED ORAL at 17:12

## 2020-11-01 RX ADMIN — Medication 400 MG: at 17:12

## 2020-11-01 RX ADMIN — RISPERIDONE 1 MG: 1 TABLET ORAL at 17:13

## 2020-11-01 RX ADMIN — AMLODIPINE BESYLATE 5 MG: 5 TABLET ORAL at 04:45

## 2020-11-01 RX ADMIN — GABAPENTIN 200 MG: 100 CAPSULE ORAL at 12:17

## 2020-11-01 RX ADMIN — CYCLOBENZAPRINE 10 MG: 10 TABLET, FILM COATED ORAL at 19:24

## 2020-11-01 RX ADMIN — HYDROXYZINE HYDROCHLORIDE 25 MG: 50 TABLET, FILM COATED ORAL at 04:45

## 2020-11-01 ASSESSMENT — PAIN DESCRIPTION - PAIN TYPE
TYPE: ACUTE PAIN

## 2020-11-02 PROCEDURE — 700102 HCHG RX REV CODE 250 W/ 637 OVERRIDE(OP): Performed by: NURSE PRACTITIONER

## 2020-11-02 PROCEDURE — A9270 NON-COVERED ITEM OR SERVICE: HCPCS | Performed by: NURSE PRACTITIONER

## 2020-11-02 PROCEDURE — 770001 HCHG ROOM/CARE - MED/SURG/GYN PRIV*

## 2020-11-02 PROCEDURE — 700101 HCHG RX REV CODE 250: Performed by: NURSE PRACTITIONER

## 2020-11-02 PROCEDURE — 29581 APPL MULTLAYER CMPRN SYS LEG: CPT

## 2020-11-02 RX ADMIN — Medication 400 MG: at 18:36

## 2020-11-02 RX ADMIN — OXYCODONE HYDROCHLORIDE 10 MG: 10 TABLET ORAL at 20:49

## 2020-11-02 RX ADMIN — GABAPENTIN 200 MG: 100 CAPSULE ORAL at 12:57

## 2020-11-02 RX ADMIN — DIVALPROEX SODIUM 125 MG: 125 CAPSULE ORAL at 04:24

## 2020-11-02 RX ADMIN — Medication 400 MG: at 04:24

## 2020-11-02 RX ADMIN — DIVALPROEX SODIUM 125 MG: 125 CAPSULE ORAL at 20:50

## 2020-11-02 RX ADMIN — GABAPENTIN 200 MG: 100 CAPSULE ORAL at 04:24

## 2020-11-02 RX ADMIN — LIDOCAINE 1 PATCH: 50 PATCH CUTANEOUS at 12:57

## 2020-11-02 RX ADMIN — RISPERIDONE 0.5 MG: 0.5 TABLET ORAL at 04:24

## 2020-11-02 RX ADMIN — OXYCODONE HYDROCHLORIDE 10 MG: 10 TABLET ORAL at 12:57

## 2020-11-02 RX ADMIN — RISPERIDONE 1 MG: 1 TABLET ORAL at 18:36

## 2020-11-02 RX ADMIN — GABAPENTIN 200 MG: 100 CAPSULE ORAL at 18:36

## 2020-11-02 RX ADMIN — DIVALPROEX SODIUM 125 MG: 125 CAPSULE ORAL at 12:57

## 2020-11-02 ASSESSMENT — PAIN DESCRIPTION - PAIN TYPE
TYPE: ACUTE PAIN

## 2020-11-02 NOTE — DISCHARGE PLANNING
Anticipated Discharge Disposition:  with public guardianship    Barriers to Discharge: Guardianship pending  [facility] acceptance    Plan: Continue to follow and assist with placement in [facility] when guardianship in place.

## 2020-11-02 NOTE — CARE PLAN
Problem: Knowledge Deficit  Goal: Knowledge of disease process/condition, treatment plan, diagnostic tests, and medications will improve  Outcome: PROGRESSING AS EXPECTED     Problem: Safety  Goal: Will remain free from injury  Outcome: PROGRESSING SLOWER THAN EXPECTED

## 2020-11-02 NOTE — WOUND TEAM
[facility]  Inpatient Services  Wound and Skin Care Progress Note    Admission Date: 8/7/2020     Last order of IP CONSULT TO WOUND CARE was found on 9/1/2020 from Hospital Encounter on 8/7/2020     HPI, PMH, SH: Reviewed    Unit where seen by Wound Team: [id number]     WOUND CONSULT/FOLLOW UP RELATED TO:  Left leg     Self Report / Pain Level: none reported    OBJECTIVE:  2 layer intact, sacral mepilex on left LE peeling off.     WOUND TYPE, LOCATION, CHARACTERISTICS (Pressure Injuries: location, stage, POA or date identified)     Wound 08/07/20 Full Thickness Wound Leg LLE posterior, extends medially and laterally (Active)   Wound Image      10/29/20 1100   Site Assessment Pink;Red;Slough 11/02/20 1200   Periwound Assessment Clean;Dry;Intact;Scar tissue 11/02/20 1200   Margins Attached edges;Defined edges 11/02/20 1200   Closure Secondary intention 11/02/20 1200   Drainage Amount Large 11/02/20 1200   Drainage Description Serosanguineous 11/02/20 1200   Treatments Cleansed;Irrigation;Site care 11/02/20 1200   Wound Cleansing Approved Wound Cleanser 11/02/20 1200   Periwound Protectant Not Applicable 11/02/20 1200   Dressing Cleansing/Solutions Not Applicable 11/02/20 1200   Dressing Options Hydrofiber Silver;Compression Wrap Two Layer;Mepilex 11/02/20 1200   Dressing Changed Changed 11/02/20 1200   Dressing Status Clean;Dry;Intact 11/02/20 1200   Dressing Change/Treatment Frequency By Wound Team Only 11/02/20 1200   NEXT Dressing Change/Treatment Date 11/05/20 11/02/20 1200   NEXT Weekly Photo (Inpatient Only) 11/05/20 10/29/20 1100   Non-staged Wound Description Full thickness 10/29/20 1100   Wound Length (cm) 30 cm 10/29/20 1100   Wound Width (cm) 13.5 cm 10/29/20 1100   Wound Depth (cm) 0.2 cm 10/29/20 1100   Wound Surface Area (cm^2) 405 cm^2 10/29/20 1100   Wound Volume (cm^3) 81 cm^3 10/29/20 1100   Post-Procedure Length (cm) 30 cm 10/29/20 1100   Post-Procedure Width (cm) 14 cm 10/29/20 1100    Post-Procedure Depth (cm) 0.4 cm 10/29/20 1100   Post-Procedure Surface Area (cm^2) 420 cm^2 10/29/20 1100   Post-Procedure Volume (cm^3) 168 cm^3 10/29/20 1100   Wound Healing % -54 10/29/20 1100   Wound Bed Granulation (%) 80 % 10/23/20 1800   Wound Bed Epithelium (%) 0 % 20 1300   Wound Bed Slough (%) 20 % 10/23/20 1800   Wound Bed Granulation (%) - Post-Procedure 100 % 10/29/20 1100   Wound Bed Epithelium % - (Post-Procedure) 0 % 20 1300   Wound Bed Slough % - (Post-Procedure) 0 % 10/07/20 1100   Wound Bed Eschar % - (Post-Procedure) 0 % 20 1300   Tunneling (cm) 0 cm 20 1300   Undermining (cm) 0 cm 20 1300   Shape Irregular 20 1200   Wound Odor Mild 20 1200   Pulses Left;2+;DP;PT 20 1200   Exposed Structures None 20 1200   WOUND NURSE ONLY - Time Spent with Patient (mins) 45 20 1200                   VASCULAR    CESAR:   CESAR Results, Last 30 Days Us-cesar Single Level Bilat    Result Date: 2020  Narrative  Vascular Laboratory  Conclusions  1.  Normal bilateral CESAR's.  GRUPO GANN  Age:    64    Gender:     M  MRN:    [id number]  :    1955      BSA:  Exam Date:     2020 09:59  Room #:     Inpatient  Priority:     Routine  Ht (in):             Wt (lb):  Ordering Physician:        EDDA CANADA  Referring Physician:       [id number] Morrissey  Sonographer:               Deja Ellis RDMS                             RVT  Study Type:                Complete Bilateral  Technical Quality:         Adequate  Indications:     Ulceration of LE  CPT Codes:       84150  ICD Codes:       707.1  History:         Nonhealing wound of left lower extremity.  Limitations:                 RIGHT  Waveform            Systolic BPs (mmHg)                             117           Brachial  Triphasic                                Common Femoral  Triphasic                  138            Posterior Tibial  Triphasic                  132           Dorsalis Pedis                                           Peroneal                             1.18          KOSTAS                                           TBI                       LEFT  Waveform        Systolic BPs (mmHg)                             114           Brachial  Triphasic                                Common Femoral  Triphasic                  127           Posterior Tibial  Triphasic                  122           Dorsalis Pedis                                           Peroneal                             1.09          KOSTAS                                           TBI  Findings  Right.  Doppler waveforms of the common femoral artery are of high amplitude and  triphasic.  Doppler waveforms at the ankle are brisk and triphasic.  Ankle-brachial index is normal.  Left.  Doppler waveforms of the common femoral artery are of high amplitude and  triphasic.  Doppler waveforms at the ankle are brisk and triphasic.  Ankle-brachial index is normal.  Unable to obtained popliteal waveforms due to wound bandages.  Additional testing was not performed in accordance with lower extremity  arterial evaluation protocol.  Clover Maya  (Electronically Signed)  Final Date:      02 September 2020                   11:14    Lab Values:    Lab Results   Component Value Date/Time    WBC 6.0 10/14/2020 11:43 AM    RBC 3.87 (L) 10/14/2020 11:43 AM    HEMOGLOBIN 11.0 (L) 10/14/2020 11:43 AM    HEMATOCRIT 33.9 (L) 10/14/2020 11:43 AM    CREACTPROT 1.07 (H) 08/12/2020 01:55 PM    SEDRATEWES 85 (H) 08/07/2020 01:20 PM    HBA1C 5.7 (H) 11/09/2018 06:30 PM        Culture Results show:  Recent Results (from the past 720 hour(s))   CULTURE WOUND W/ GRAM STAIN    Collection Time: 09/01/20  2:00 PM    Specimen: Left Leg; Wound   Result Value Ref Range    Significant Indicator POS (POS)     Source WND     Site LEFT LEG     Culture Result - (A)     Gram Stain Result       Moderate  Gram positive cocci.  Moderate Gram positive rods.      Culture Result (A)      Beta Hemolytic Streptococcus group A  Moderate growth      Culture Result (A)      Methicillin Resistant Staphylococcus aureus  Moderate growth      Culture Result (A)      Diphtheroids  Moderate growth  Mixed morphologies.         INTERVENTIONS BY WOUND TEAM:    Dressings removed and wound and addison wound cleansed with NS and wound cleanser, patted dry with gauze.  Collagen dressing onto wound beds allowing pt's wound drainage to activate product, covered with Aquacel Ag then ABD to leg aspect and mepilex to thigh aspect of this long wound.  2 layer wrap to left LE.       Interdisciplinary consultation: Patient, Bedside RN (Enma)    EVALUATION / RATIONALE FOR TREATMENT:    10/29: Excisional debridement by Asaf ELLER of scar tissue along the proximal edge of the posterior knee and lateral thigh.  Lateral aspect of thigh is flatten.  Medial aspect of knee has thick scar tissue that was excisionally debrided by Asaf ELLER.  Fully granulated throughout the wound bed.   10/23 wound bed mostly granular, superficial in depth, progress has been slow with the wound VAC so will trial collagen product to encourage new tissue growth.    10/19: wound bed has improved in color and is fully granulated.  Addison-wound has improved, denudation has resolved. APRN continuing with serial weekly debridements as needed.   10/16: wound friable pt now on iv abx per ID.  Indurated edges addressed with drape and foam.  Addison wound likely has yeast infection - addressing with silver powder crusting  10/14: patient seen with Asaf ELLER, stopping veraflo instillation.  Starting silver powder and regular wound VAC to see if it will help with bioburden.  Possible colonization of bacteria.  ID involved.   10/12: Inflammation noted to the proximal part of the wound bed.  Will continue to monitor, possible vac break on Wednesday to help addison-skin  inflammation.   10/7: Excisional debridement performed by Asaf ELLER. Will need to continue selective debridement with NPWT changes, and weekly excisional debridement with APRN to flatten out the scar tissue.  IV abx therapy ended 10/5.   10/5: Lateral wound still with some slough, both wounds smaller per measurements. Medial wound with all granular tissue.  9/30: Patient to start abx therapy for 7 days course per Dr. Ellis.  Started dakin's instillation to veraflo  9/28: malodorous today, culture taken.    9/25: Odor noted, though unclear if from wound or pt, consider shower before next Vac change. Consider regular vac next change, wound granular and superficial.   9/23: Patient still awaiting guardianship for placement.   9/18: wound granulating nicely and responding well to current treatment.    9/16: Wound bed with granular tissue, edges are thick but appear better than previous assessments. . NPWT VF to encourage closure by secondary intention and manage drainage while encouraging oxygenation and granulation to the wound bed. 2 layer compression to assist in decrease of swelling and encourage blood flow to wounds. Wound team to follow up and change 3x/week.      Goals: Steady decrease in wound area and depth weekly.    NURSING PLAN OF CARE ORDERS (X):    Dressing changes: See Dressing Care orders: X  Skin care: See Skin Care orders:   Rectal tube care: See Rectal Tube Care orders:   Other orders:      WOUND TEAM PLAN OF CARE:   Dressing changes by wound team:     X, 2 layer compression wrap 2x/weekly due to drainage.   Follow up 3 times weekly:                NPWT change 3 times weekly:    Follow up 1-2 times weekly:      Follow up Bi-Monthly:                   Follow up as needed:       Other (explain):     Anticipated discharge plans: pending guardianship.   LTACH:        SNF/Rehab: X - patient will need ongoing wound care for LLE upon discharge - 2x/weekly           Home Health Care:            Outpatient Wound Center:            Self Care:

## 2020-11-03 PROCEDURE — 770001 HCHG ROOM/CARE - MED/SURG/GYN PRIV*

## 2020-11-03 PROCEDURE — A9270 NON-COVERED ITEM OR SERVICE: HCPCS | Performed by: NURSE PRACTITIONER

## 2020-11-03 PROCEDURE — 700102 HCHG RX REV CODE 250 W/ 637 OVERRIDE(OP): Performed by: NURSE PRACTITIONER

## 2020-11-03 PROCEDURE — 700101 HCHG RX REV CODE 250: Performed by: NURSE PRACTITIONER

## 2020-11-03 RX ADMIN — Medication 400 MG: at 17:18

## 2020-11-03 RX ADMIN — AMLODIPINE BESYLATE 5 MG: 5 TABLET ORAL at 05:50

## 2020-11-03 RX ADMIN — RISPERIDONE 0.5 MG: 0.5 TABLET ORAL at 05:50

## 2020-11-03 RX ADMIN — DIVALPROEX SODIUM 125 MG: 125 CAPSULE ORAL at 23:23

## 2020-11-03 RX ADMIN — GABAPENTIN 200 MG: 100 CAPSULE ORAL at 12:41

## 2020-11-03 RX ADMIN — DIVALPROEX SODIUM 125 MG: 125 CAPSULE ORAL at 12:41

## 2020-11-03 RX ADMIN — DIVALPROEX SODIUM 125 MG: 125 CAPSULE ORAL at 05:50

## 2020-11-03 RX ADMIN — OXYCODONE HYDROCHLORIDE 10 MG: 10 TABLET ORAL at 23:23

## 2020-11-03 RX ADMIN — OXYCODONE HYDROCHLORIDE 10 MG: 10 TABLET ORAL at 09:36

## 2020-11-03 RX ADMIN — OXYCODONE HYDROCHLORIDE 10 MG: 10 TABLET ORAL at 17:18

## 2020-11-03 RX ADMIN — Medication 400 MG: at 05:50

## 2020-11-03 RX ADMIN — RISPERIDONE 1 MG: 1 TABLET ORAL at 17:18

## 2020-11-03 RX ADMIN — GABAPENTIN 200 MG: 100 CAPSULE ORAL at 17:18

## 2020-11-03 RX ADMIN — GABAPENTIN 200 MG: 100 CAPSULE ORAL at 05:50

## 2020-11-03 RX ADMIN — LIDOCAINE 1 PATCH: 50 PATCH CUTANEOUS at 12:41

## 2020-11-03 ASSESSMENT — PAIN DESCRIPTION - PAIN TYPE
TYPE: ACUTE PAIN

## 2020-11-03 NOTE — CARE PLAN
Problem: Safety  Goal: Will remain free from injury  Outcome: PROGRESSING AS EXPECTED  Goal: Will remain free from falls  Outcome: PROGRESSING AS EXPECTED   Sitter at bedside

## 2020-11-03 NOTE — CARE PLAN
Problem: Safety  Goal: Will remain free from injury  Outcome: PROGRESSING AS EXPECTED   Treaded socks in place, bed in the lowest position, 1:1 sitter at bedside, call light and belongings within reach, pt call for assistance appropriately   Problem: Discharge Barriers/Planning  Goal: Patient's continuum of care needs will be met  Outcome: PROGRESSING AS EXPECTED   Pending Guardianship  Problem: Pain Management  Goal: Pain level will decrease to patient's comfort goal  Outcome: PROGRESSING AS EXPECTED

## 2020-11-03 NOTE — PROGRESS NOTES
Pt A&Ox4, numbness and tingling in L leg, pain is 10/10 (pain med given).    Pt refused bed alarm despite educ, call light within reach, sitter at bedside.

## 2020-11-04 PROCEDURE — A9270 NON-COVERED ITEM OR SERVICE: HCPCS | Performed by: NURSE PRACTITIONER

## 2020-11-04 PROCEDURE — 770001 HCHG ROOM/CARE - MED/SURG/GYN PRIV*

## 2020-11-04 PROCEDURE — 700102 HCHG RX REV CODE 250 W/ 637 OVERRIDE(OP): Performed by: NURSE PRACTITIONER

## 2020-11-04 PROCEDURE — 700101 HCHG RX REV CODE 250: Performed by: NURSE PRACTITIONER

## 2020-11-04 PROCEDURE — 99231 SBSQ HOSP IP/OBS SF/LOW 25: CPT | Performed by: NURSE PRACTITIONER

## 2020-11-04 RX ADMIN — GABAPENTIN 200 MG: 100 CAPSULE ORAL at 12:38

## 2020-11-04 RX ADMIN — RISPERIDONE 1 MG: 1 TABLET ORAL at 18:07

## 2020-11-04 RX ADMIN — AMLODIPINE BESYLATE 5 MG: 5 TABLET ORAL at 06:11

## 2020-11-04 RX ADMIN — OXYCODONE HYDROCHLORIDE 10 MG: 10 TABLET ORAL at 12:38

## 2020-11-04 RX ADMIN — OXYCODONE HYDROCHLORIDE 10 MG: 10 TABLET ORAL at 18:07

## 2020-11-04 RX ADMIN — GABAPENTIN 200 MG: 100 CAPSULE ORAL at 06:11

## 2020-11-04 RX ADMIN — DIVALPROEX SODIUM 125 MG: 125 CAPSULE ORAL at 12:38

## 2020-11-04 RX ADMIN — DIVALPROEX SODIUM 125 MG: 125 CAPSULE ORAL at 06:10

## 2020-11-04 RX ADMIN — GABAPENTIN 200 MG: 100 CAPSULE ORAL at 18:07

## 2020-11-04 RX ADMIN — LIDOCAINE 1 PATCH: 50 PATCH CUTANEOUS at 12:38

## 2020-11-04 RX ADMIN — Medication 400 MG: at 06:11

## 2020-11-04 RX ADMIN — OXYCODONE HYDROCHLORIDE 10 MG: 10 TABLET ORAL at 06:10

## 2020-11-04 RX ADMIN — Medication 400 MG: at 18:07

## 2020-11-04 RX ADMIN — RISPERIDONE 0.5 MG: 0.5 TABLET ORAL at 06:11

## 2020-11-04 RX ADMIN — HYDROXYZINE HYDROCHLORIDE 25 MG: 50 TABLET, FILM COATED ORAL at 18:06

## 2020-11-04 RX ADMIN — DIVALPROEX SODIUM 125 MG: 125 CAPSULE ORAL at 21:56

## 2020-11-04 ASSESSMENT — PAIN DESCRIPTION - PAIN TYPE
TYPE: ACUTE PAIN
TYPE: CHRONIC PAIN

## 2020-11-04 NOTE — CARE PLAN
Problem: Safety  Goal: Will remain free from injury  Outcome: PROGRESSING AS EXPECTED  Note: Fall Precautions in place: Sitter at bedside at all times, Non-skid socks on, bed locked and in lowest position, upper bed rails up, DME in bathroom, call light within reach.      Problem: Pain Management  Goal: Pain level will decrease to patient's comfort goal  Outcome: PROGRESSING AS EXPECTED  Note: Reports constant pain to LLE, medicated per MAR with some relief. Non pharm comfort measure include: rest, food, distraction, activity.

## 2020-11-04 NOTE — CARE PLAN
Problem: Safety  Goal: Will remain free from injury  Outcome: PROGRESSING AS EXPECTED  Note: 1:1 safety sitter at bedside.      Problem: Pain Management  Goal: Pain level will decrease to patient's comfort goal  Outcome: PROGRESSING AS EXPECTED  Note: Medicated with PRN oxycodone Q6

## 2020-11-05 PROBLEM — R52 ACUTE PAIN: Status: RESOLVED | Noted: 2020-10-17 | Resolved: 2020-11-05

## 2020-11-05 PROBLEM — B85.0 PEDICULOSIS CAPITIS: Status: RESOLVED | Noted: 2020-08-12 | Resolved: 2020-11-05

## 2020-11-05 PROCEDURE — 700102 HCHG RX REV CODE 250 W/ 637 OVERRIDE(OP): Performed by: NURSE PRACTITIONER

## 2020-11-05 PROCEDURE — A9270 NON-COVERED ITEM OR SERVICE: HCPCS | Performed by: NURSE PRACTITIONER

## 2020-11-05 PROCEDURE — 29581 APPL MULTLAYER CMPRN SYS LEG: CPT

## 2020-11-05 PROCEDURE — 11045 DBRDMT SUBQ TISS EACH ADDL: CPT | Performed by: NURSE PRACTITIONER

## 2020-11-05 PROCEDURE — 11042 DBRDMT SUBQ TIS 1ST 20SQCM/<: CPT | Performed by: NURSE PRACTITIONER

## 2020-11-05 PROCEDURE — 770001 HCHG ROOM/CARE - MED/SURG/GYN PRIV*

## 2020-11-05 PROCEDURE — 700111 HCHG RX REV CODE 636 W/ 250 OVERRIDE (IP): Performed by: NURSE PRACTITIONER

## 2020-11-05 RX ADMIN — RISPERIDONE 1 MG: 1 TABLET ORAL at 18:33

## 2020-11-05 RX ADMIN — DIVALPROEX SODIUM 125 MG: 125 CAPSULE ORAL at 21:10

## 2020-11-05 RX ADMIN — HALOPERIDOL LACTATE 5 MG: 5 INJECTION, SOLUTION INTRAMUSCULAR at 13:39

## 2020-11-05 RX ADMIN — AMLODIPINE BESYLATE 5 MG: 5 TABLET ORAL at 06:17

## 2020-11-05 RX ADMIN — OXYCODONE HYDROCHLORIDE 10 MG: 10 TABLET ORAL at 18:33

## 2020-11-05 RX ADMIN — DIVALPROEX SODIUM 125 MG: 125 CAPSULE ORAL at 06:17

## 2020-11-05 RX ADMIN — GABAPENTIN 200 MG: 100 CAPSULE ORAL at 18:33

## 2020-11-05 RX ADMIN — OXYCODONE HYDROCHLORIDE 10 MG: 10 TABLET ORAL at 03:10

## 2020-11-05 RX ADMIN — GABAPENTIN 200 MG: 100 CAPSULE ORAL at 06:17

## 2020-11-05 RX ADMIN — Medication 400 MG: at 06:17

## 2020-11-05 RX ADMIN — Medication 400 MG: at 18:33

## 2020-11-05 RX ADMIN — RISPERIDONE 0.5 MG: 0.5 TABLET ORAL at 06:17

## 2020-11-05 ASSESSMENT — ENCOUNTER SYMPTOMS
DIZZINESS: 0
NAUSEA: 0
HEADACHES: 0
ABDOMINAL PAIN: 0
NERVOUS/ANXIOUS: 0
DIARRHEA: 0
SPEECH CHANGE: 0
FOCAL WEAKNESS: 0
COUGH: 0
VOMITING: 0
PALPITATIONS: 0
WEAKNESS: 0
SENSORY CHANGE: 0
FEVER: 0
DEPRESSION: 1
CHILLS: 0
SHORTNESS OF BREATH: 0
INSOMNIA: 0
CONSTIPATION: 0

## 2020-11-05 ASSESSMENT — PAIN DESCRIPTION - PAIN TYPE
TYPE: CHRONIC PAIN
TYPE: CHRONIC PAIN
TYPE: ACUTE PAIN

## 2020-11-05 NOTE — PROGRESS NOTES
Pt. become very agitated started yelling, throwing things and hitting himself. This RN attempted to calm pt. with no success, haldol was given.

## 2020-11-05 NOTE — WOUND TEAM
[facility] Wound & Ostomy Care  Inpatient Services  Wound and Skin Care Progress Note    Admission Date: 8/7/2020     Last order of IP CONSULT TO WOUND CARE was found on 9/1/2020 from Hospital Encounter on 8/7/2020     HPI, PMH, SH: Reviewed    Unit where seen by Wound Team: [id number]    WOUND CONSULT/FOLLOW UP RELATED TO:  Left leg     Self Report / Pain Level: pain with debridement, topical lidocaine used    OBJECTIVE:  2 layer wrap intact, sacral mepilex on left LE peeling off. Safety Sitter at bedside    WOUND TYPE, LOCATION, CHARACTERISTICS (Pressure Injuries: location, stage, POA or date identified)       Wound 08/07/20 Full Thickness Wound Leg LLE posterior, extends medially and laterally (Active)       11/05/20 1400   Site Assessment Red    Periwound Assessment Pink    Margins Attached edges    Closure Secondary intention    Drainage Amount Small    Drainage Description Serosanguineous    Treatments Compression;CSWD - Conservative Sharp Wound Debridement    Wound Cleansing Normal Saline Irrigation    Periwound Protectant Not Applicable    Dressing Cleansing/Solutions Not Applicable    Dressing Options Collagen Dressing;Hydrofiber Silver;Compression Wrap Two Layer    Dressing Changed Changed    Dressing Status Intact    Dressing Change/Treatment Frequency By Wound Team Only    NEXT Dressing Change/Treatment Date 11/10/20    NEXT Weekly Photo (Inpatient Only) 11/12/20    Non-staged Wound Description Full thickness    Wound Length (cm) 30 cm    Wound Width (cm) 13.7 cm    Wound Depth (cm) 0.2 cm    Wound Surface Area (cm^2) 411 cm^2    Wound Volume (cm^3) 82.2 cm^3    Post-Procedure Length (cm) 30 cm    Post-Procedure Width (cm) 14.1 cm    Post-Procedure Depth (cm) 0.2 cm    Post-Procedure Surface Area (cm^2) 423 cm^2    Post-Procedure Volume (cm^3) 84.6 cm^3    Wound Healing % -56    Wound Bed Granulation (%) - Post-Procedure 100 %    Undermining (cm) 0 cm    Shape irregualr    Wound Odor None    Exposed Structures  Tendon           VASCULAR    CESAR:   CESAR Results, Last 30 Days Us-cesar Single Level Bilat    Result Date: 2020  Narrative  Vascular Laboratory  Conclusions  1.  Normal bilateral CESAR's.  GRUPO GANN  Age:    64    Gender:     M  MRN:    [id number]  :    1955      BSA:  Exam Date:     2020 09:59  Room #:     Inpatient  Priority:     Routine  Ht (in):             Wt (lb):  Ordering Physician:        EDDA CANADA  Referring Physician:       [id number] Morrissey  Sonographer:               Deja Ellis RDMS                             RVT  Study Type:                Complete Bilateral  Technical Quality:         Adequate  Indications:     Ulceration of LE  CPT Codes:       52976  ICD Codes:       707.1  History:         Nonhealing wound of left lower extremity.  Limitations:                 RIGHT  Waveform            Systolic BPs (mmHg)                             117           Brachial  Triphasic                                Common Femoral  Triphasic                  138           Posterior Tibial  Triphasic                  132           Dorsalis Pedis                                           Peroneal                             1.18          CESAR                                           TBI                       LEFT  Waveform        Systolic BPs (mmHg)                             114           Brachial  Triphasic                                Common Femoral  Triphasic                  127           Posterior Tibial  Triphasic                  122           Dorsalis Pedis                                           Peroneal                             1.09          CESAR                                           TBI  Findings  Right.  Doppler waveforms of the common femoral artery are of high amplitude and  triphasic.  Doppler waveforms at the ankle are brisk and triphasic.  Ankle-brachial index is normal.  Left.  Doppler  waveforms of the common femoral artery are of high amplitude and  triphasic.  Doppler waveforms at the ankle are brisk and triphasic.  Ankle-brachial index is normal.  Unable to obtained popliteal waveforms due to wound bandages.  Additional testing was not performed in accordance with lower extremity  arterial evaluation protocol.  Clover VINCENT Rickylashae  (Electronically Signed)  Final Date:      02 September 2020                   11:14    Lab Values:    Lab Results   Component Value Date/Time    WBC 6.0 10/14/2020 11:43 AM    RBC 3.87 (L) 10/14/2020 11:43 AM    HEMOGLOBIN 11.0 (L) 10/14/2020 11:43 AM    HEMATOCRIT 33.9 (L) 10/14/2020 11:43 AM    CREACTPROT 1.07 (H) 08/12/2020 01:55 PM    SEDRATEWES 85 (H) 08/07/2020 01:20 PM    HBA1C 5.7 (H) 11/09/2018 06:30 PM        Culture Results show:  Recent Results (from the past 720 hour(s))   CULTURE WOUND W/ GRAM STAIN    Collection Time: 09/01/20  2:00 PM    Specimen: Left Leg; Wound   Result Value Ref Range    Significant Indicator POS (POS)     Source WND     Site LEFT LEG     Culture Result - (A)     Gram Stain Result       Moderate Gram positive cocci.  Moderate Gram positive rods.      Culture Result (A)      Beta Hemolytic Streptococcus group A  Moderate growth      Culture Result (A)      Methicillin Resistant Staphylococcus aureus  Moderate growth      Culture Result (A)      Diphtheroids  Moderate growth  Mixed morphologies.         INTERVENTIONS BY WOUND TEAM:    Dressings removed and wound and addison. Wound debrided by Asaf MORENO. Wound then cleansed with NS and gauze, patted dry with gauze.  Collagen dressing onto wound beds allowing pt's wound drainage to activate product, covered with Aquacel Ag then ABD to leg aspect and mepilex to thigh aspect of this long wound.  2 layer wrap to left LE.       Interdisciplinary consultation: Patient, Bedside RN, Asaf MORENO.    EVALUATION / RATIONALE FOR TREATMENT:      11/5: Plan for debridement of scarred edges on Tues by JUANITO.  Tendon exposed to posterior aspect of wound, behind knee. Continue with current dressing care.    10/29: Excisional debridement by Asaf ELLER of scar tissue along the proximal edge of the posterior knee and lateral thigh.  Lateral aspect of thigh is flatten.  Medial aspect of knee has thick scar tissue that was excisionally debrided by Asaf ELLER.  Fully granulated throughout the wound bed.   10/23 wound bed mostly granular, superficial in depth, progress has been slow with the wound VAC so will trial collagen product to encourage new tissue growth.    10/19: wound bed has improved in color and is fully granulated.  Addison-wound has improved, denudation has resolved. APRN continuing with serial weekly debridements as needed.   10/16: wound friable pt now on iv abx per ID.  Indurated edges addressed with drape and foam.  Addison wound likely has yeast infection - addressing with silver powder crusting  10/14: patient seen with Asaf ELLER, stopping veraflo instillation.  Starting silver powder and regular wound VAC to see if it will help with bioburden.  Possible colonization of bacteria.  ID involved.   10/12: Inflammation noted to the proximal part of the wound bed.  Will continue to monitor, possible vac break on Wednesday to help addison-skin inflammation.   10/7: Excisional debridement performed by Asaf ELLER. Will need to continue selective debridement with NPWT changes, and weekly excisional debridement with APRN to flatten out the scar tissue.  IV abx therapy ended 10/5.   10/5: Lateral wound still with some slough, both wounds smaller per measurements. Medial wound with all granular tissue.  9/30: Patient to start abx therapy for 7 days course per Dr. Ellis.  Started dakin's instillation to veraflo  9/28: malodorous today, culture taken.    9/25: Odor noted, though unclear if from wound or pt, consider shower before next Vac change. Consider regular vac next change, wound granular  and superficial.   9/23: Patient still awaiting guardianship for placement.   9/18: wound granulating nicely and responding well to current treatment.    9/16: Wound bed with granular tissue, edges are thick but appear better than previous assessments. . NPWT VF to encourage closure by secondary intention and manage drainage while encouraging oxygenation and granulation to the wound bed. 2 layer compression to assist in decrease of swelling and encourage blood flow to wounds. Wound team to follow up and change 3x/week.      Goals: Steady decrease in wound area and depth weekly.    NURSING PLAN OF CARE ORDERS (X):    Dressing changes: See Dressing Care orders: X  Skin care: See Skin Care orders:   Rectal tube care: See Rectal Tube Care orders:   Other orders:      WOUND TEAM PLAN OF CARE:   Dressing changes by wound team:     X, 2 layer compression wrap 2x/weekly due to drainage.   Follow up 3 times weekly:                NPWT change 3 times weekly:    Follow up 1-2 times weekly:      Follow up Bi-Monthly:                   Follow up as needed:       Other (explain):     Anticipated discharge plans:  LTACH:        SNF/Rehab: X - patient will need ongoing wound care for LLE upon discharge - 2x/weekly           Home Health Care:           Outpatient Wound Center:            Self Care:

## 2020-11-05 NOTE — CARE PLAN
Problem: Safety  Goal: Will remain free from injury  Outcome: PROGRESSING AS EXPECTED   1:1 sitter at bedside for safety/elopement risk.     Problem: Pain Management  Goal: Pain level will decrease to patient's comfort goal  Outcome: PROGRESSING AS EXPECTED   Patient's pain is well managed at this time with PRN Q6hr oxycodone  , rest, and distraction.

## 2020-11-05 NOTE — PROGRESS NOTES
"Hospital Medicine Twice Weekly Progress Note    Date of Service  11/4/2020    Chief Complaint  LLE wound    Hospital Course  Mr. Varela is a 65-year-old male who presented to the emergency department with a left lower extremity wound infection. He was hospitalized at our facility in April and evaluated by orthopedic surgery who recommended wound care. Wound cultures at that time grew MSSA and Streptococcus. He had been seen at [facility] earlier that week and prescribed antibiotics, however he had not filled the prescription.  An ultrasound of that extremity was done and was negative for DVT.  He was treated for sepsis and completed an antibiotic course on 9/16/2020. He was also found to have head lice and underwent treatment for that.  A repeat wound culture was done on 9/28/2020 and grew Strep A and Proteus. Infectious disease was consulted and recommended a 5-day course of IV zosyn which was completed on 10/5/2020. On 10/12/2020 the wound care team noticed increased inflammation to the proximal part of the wound bed and switched from a vera flow wound VAC to a regular wound VAC.  ID was again consulted and on 10/14/2020 recommended to 7-day course of antibiotics with meropenem which was completed on 10/22/2020. Additionally, he was noted to have intermittent agitation so was started on risperdal, depakote, and gabapentin per psychiatric recommendations. He was deemed to be incapacitated to make medical decisions and is currently pending guardianship and placement, likely to a group home.     Interval Problem Update  Having some pain in his left leg which is \"tolerable\" for him right now.  Feeling \"down in the dumps\" but he is \"working through it\".  Otherwise feeling fine and has no complaints.    Vital signs reviewed and are within normal limits.    Consultants/Specialty  LPS  Infectious disease  Psychiatry    Code Status  Full Code    Disposition  Pending guardianship, then will likely pursue group " "home placement.     Review of Systems  Review of Systems   Constitutional: Negative for chills, fever and malaise/fatigue.   Respiratory: Negative for cough and shortness of breath.    Cardiovascular: Positive for leg swelling. Negative for chest pain and palpitations.   Gastrointestinal: Negative for abdominal pain, constipation, diarrhea, nausea and vomiting.   Genitourinary: Negative for dysuria, frequency and urgency.   Musculoskeletal:        Lower extremity pain, currently \"tolerable\".   Skin: Negative for itching.   Neurological: Negative for dizziness, sensory change, speech change, focal weakness, weakness and headaches.   Psychiatric/Behavioral: Positive for depression. Negative for suicidal ideas. The patient is not nervous/anxious and does not have insomnia.    All other systems reviewed and are negative.     Physical Exam  Temp:  [35.9 °C (96.6 °F)-36.1 °C (96.9 °F)] 35.9 °C (96.6 °F)  Pulse:  [82-88] 82  Resp:  [16] 16  BP: (117-136)/(71-79) 117/71  SpO2:  [92 %-96 %] 96 %    Physical Exam  Vitals signs and nursing note reviewed.   Constitutional:       General: He is awake. He is not in acute distress.     Appearance: Normal appearance. He is well-developed. He is not ill-appearing.   HENT:      Head: Normocephalic and atraumatic.      Mouth/Throat:      Lips: Pink.      Mouth: Mucous membranes are moist.   Eyes:      Conjunctiva/sclera: Conjunctivae normal.      Pupils: Pupils are equal, round, and reactive to light.   Neck:      Musculoskeletal: Normal range of motion and neck supple.   Cardiovascular:      Rate and Rhythm: Normal rate and regular rhythm.      Pulses: Normal pulses.      Heart sounds: Normal heart sounds.   Pulmonary:      Effort: Pulmonary effort is normal.      Breath sounds: Normal breath sounds.   Abdominal:      General: Bowel sounds are normal. There is no distension or abdominal bruit.      Palpations: Abdomen is soft.      Tenderness: There is no abdominal tenderness. "   Musculoskeletal:      Right lower leg: No edema.      Left lower leg: No edema.   Skin:     General: Skin is warm and dry.      Findings: Wound present.          Neurological:      General: No focal deficit present.      Mental Status: He is alert and oriented to person, place, and time.      GCS: GCS eye subscore is 4. GCS verbal subscore is 5. GCS motor subscore is 6.   Psychiatric:         Attention and Perception: Attention and perception normal.         Mood and Affect: Affect normal. Mood is depressed.         Speech: Speech normal.         Behavior: Behavior normal. Behavior is cooperative.         Thought Content: Thought content normal.         Cognition and Memory: Cognition and memory normal.         Judgment: Judgment normal.     Fluids    Intake/Output Summary (Last 24 hours) at 11/5/2020 1625  Last data filed at 11/5/2020 1300  Gross per 24 hour   Intake 1200 ml   Output no documentation   Net 1200 ml     Laboratory    Imaging  IR-MIDLINE CATHETER INSERTION WO GUIDANCE > AGE 3   Final Result                  Ultrasound-guided midline placement performed by qualified nursing staff    as above.          IR-US GUIDED PIV   Final Result    Ultrasound-guided PERIPHERAL IV INSERTION performed by    qualified nursing staff as above.      IR-MIDLINE CATHETER INSERTION WO GUIDANCE > AGE 3   Final Result                  Ultrasound-guided midline placement performed by qualified nursing staff    as above.          US-KOSTAS SINGLE LEVEL BILAT   Final Result      CT-HEAD W/O   Final Result      No acute intracranial abnormality      DX-TIBIA AND FIBULA LEFT   Final Result      1.  Healed distal metaphyseal fractures of the left fibula and tibia with tibial IM layla in place.      2.  No acute fracture or dislocation.      3.  No radiopaque soft tissue foreign body.      DX-FEMUR-2+ LEFT   Final Result      1.  No radiographic evidence of acute traumatic injury left femur.      2.  Unchanged cortical thickening in  the posterior aspect of the distal femoral diaphysis which may represent sequela of prior injury or infection.      3.  No soft tissue foreign body identified.      US-EXTREMITY VENOUS LOWER UNILAT LEFT   Final Result      DX-CHEST-PORTABLE (1 VIEW)   Final Result      No acute cardiopulmonary abnormality.         Assessment/Plan  Infection of wound due to methicillin resistant Staphylococcus aureus (MRSA)- (present on admission)  Assessment & Plan  -History of MRSA.  -Poor compliance with OP wound care.   -Continue wound care.  Wound VAC was discontinued on 10/30/2020.    Encephalopathy- (present on admission)  Assessment & Plan  -Acute on chronic, likely due Wernicke's.  -Psychiatry: incapacitated to make medical decision or leave AMA   -Kinship and placement pending.  -Avoid narcotics and hypnotics.    -Frequent reorientation.  -Sleep hygiene.    Non-compliance  Assessment & Plan  -Likely also component of psychiatric disorder and ETOH abuse.  Improved now the inpatient.  -Psychiatric consulted and suggests patient is incapacitated to make medical decisions or leave AMA.  -Continue ongoing encouragement for compliance.    Psychiatric disorder  Assessment & Plan  -Unknown/unspecified.  -Psychiatry consulted and deemed him incapacitated to leave AMA or make medical decisions.  -Continue Risperdal and Depakote.    Essential hypertension- (present on admission)  Assessment & Plan  -Well-controlled on current regimen.  Continue amlodipine.    Flexion contracture of knee, 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)  Assessment & Plan  -Secondary to chronic wound in that area.  -PT/OT.  -Encourage mobility.    Emphysema/COPD (HCC)  Assessment & Plan  -Chronic and stable, without acute exacerbation.    Alcohol dependence (HCC)- (present on admission)  Assessment & Plan  -History of. Abstinent since admission.    Protein malnutrition (HCC)  Assessment & Plan  -Moderate/severe.  -Body mass index is 21.26 kg/m². (Improving)  -Continue TID  supplements.  -Encourage PO intake.    Tobacco dependence- (present on admission)  Assessment & Plan  -Abstinent since admission.     VTE prophylaxis: Ambulation      Cassia Ohara, MSN, RN, APRN, ACNPC-AG, CCRN  Nurse Practitioner, [facility]  [phone]    11/5/2020    4:25 PM

## 2020-11-06 PROCEDURE — A9270 NON-COVERED ITEM OR SERVICE: HCPCS | Performed by: NURSE PRACTITIONER

## 2020-11-06 PROCEDURE — 700102 HCHG RX REV CODE 250 W/ 637 OVERRIDE(OP): Performed by: NURSE PRACTITIONER

## 2020-11-06 PROCEDURE — 700101 HCHG RX REV CODE 250: Performed by: NURSE PRACTITIONER

## 2020-11-06 PROCEDURE — 770001 HCHG ROOM/CARE - MED/SURG/GYN PRIV*

## 2020-11-06 RX ADMIN — DIVALPROEX SODIUM 125 MG: 125 CAPSULE ORAL at 20:21

## 2020-11-06 RX ADMIN — RISPERIDONE 0.5 MG: 0.5 TABLET ORAL at 06:35

## 2020-11-06 RX ADMIN — OXYCODONE HYDROCHLORIDE 10 MG: 10 TABLET ORAL at 13:16

## 2020-11-06 RX ADMIN — GABAPENTIN 200 MG: 100 CAPSULE ORAL at 18:27

## 2020-11-06 RX ADMIN — Medication 400 MG: at 06:35

## 2020-11-06 RX ADMIN — DIVALPROEX SODIUM 125 MG: 125 CAPSULE ORAL at 06:35

## 2020-11-06 RX ADMIN — GABAPENTIN 200 MG: 100 CAPSULE ORAL at 11:54

## 2020-11-06 RX ADMIN — OXYCODONE HYDROCHLORIDE 10 MG: 10 TABLET ORAL at 19:21

## 2020-11-06 RX ADMIN — OXYCODONE HYDROCHLORIDE 10 MG: 10 TABLET ORAL at 06:35

## 2020-11-06 RX ADMIN — DIVALPROEX SODIUM 125 MG: 125 CAPSULE ORAL at 13:16

## 2020-11-06 RX ADMIN — LIDOCAINE 1 PATCH: 50 PATCH CUTANEOUS at 11:54

## 2020-11-06 RX ADMIN — GABAPENTIN 200 MG: 100 CAPSULE ORAL at 06:35

## 2020-11-06 RX ADMIN — RISPERIDONE 1 MG: 1 TABLET ORAL at 18:27

## 2020-11-06 RX ADMIN — Medication 400 MG: at 18:27

## 2020-11-06 ASSESSMENT — PAIN DESCRIPTION - PAIN TYPE
TYPE: CHRONIC PAIN

## 2020-11-06 NOTE — CARE PLAN
Problem: Safety  Goal: Will remain free from injury  Outcome: PROGRESSING AS EXPECTED  Note: Educated pt to use call button to call for help before getting up. Bed rails up x2. Provided hospital non-slip socks. Bed locked and at the lowest position. 1:1 sitter at bedside.     Problem: Pain Management  Goal: Pain level will decrease to patient's comfort goal  Outcome: PROGRESSING AS EXPECTED  Note:   Discussed pt's desired comfort goal. Educated on pharm/non-pharm measures of preventing pain. Verbalized understanding. Pt has been within comfort goal.

## 2020-11-06 NOTE — DISCHARGE PLANNING
Anticipated Discharge Disposition: Pending Public Guardianship prior to group home placement     Action: LSW received 2 letters for pt from Tati Blancas CM , informing pt of his legal representation. Unable to locate pt's chart, both letters were given to BS RN Sunita, she stated that she would handle it- one for the pt and one for the pt's chart.     Barriers to Discharge: gaurdianship, group home acceptance     Plan: f/u with medical team, Teri Madrigal.

## 2020-11-06 NOTE — CARE PLAN
Problem: Safety  Goal: Will remain free from injury  Outcome: PROGRESSING AS EXPECTED   Patient has 1:1 sitter at bedside for elopement risk/safety.     Problem: Venous Thromboembolism (VTW)/Deep Vein Thrombosis (DVT) Prevention:  Goal: Patient will participate in Venous Thrombosis (VTE)/Deep Vein Thrombosis (DVT)Prevention Measures  Outcome: PROGRESSING AS EXPECTED   Patient refuses SCDs but ambulates around room frequently.

## 2020-11-06 NOTE — PROGRESS NOTES
"WOUND CARE PROVIDER PROGRESS NOTE        HPI: 65-year-old male homelessness, EtOH use, tobacco dependence, chronic left lower extremity wound admitted 8/7/2020 with left lower extremity wound infection.  Patient was recently hospitalized at [facility] in April 2020, evaluated by orthopedic surgery who recommended wound care.  Wound culture grew MSSA and strep.  Patient was recently seen at [facility] prior to this admission was given a prescription for antibiotics but did not fill this.  Patient reports that he has had this wound for 8 to 10 years.  He reports that he fell and went \"ass over tea kettle\".  He reports that he would clean the wound almost daily with soap and water at the homeless shelter.  Per chart review, patient reported he developed the ulcer in May 2018 when he fell while hiking.  Patient was seen at wound care clinic in August 2018.  Patient had a  assisting him while he was living at [facility] care housing.  Wound VAC /was ordered however  had concerns with patient's compliancy and/ access to medical care.  Skilled nursing facility was contacted to have patient be admitted, however he was not accepted due to Medicaid.    Patient is on Lovenox 40 mg daily.  Refused today however he took it yesterday.  Allergies reviewed.  He denies any allergies.      Interval hx:   9/11/2020: pt calm cooperative. On zyvox. Seen during VAC and two layer compression wrap change. Pt tolerating VAC and wraps. Wound decreased in size.   9/18/2020: Patient denies any fevers, chills, nausea, vomiting.  He is tolerating the veraflo wound VAC and 2 layer compression wrap.  Wound is decreasing in size.  Increased granular tissue noted, wound edges have improved however he does have rolled edges to popliteal fossa area.  Patient calm and cooperative, watching sports.  9/30/2020: Denies fevers, chills, nausea, vomiting.  Tolerating wound VAC and 2 layer compression wrap.  Refused nail care.  Wound culture " positive for strep A and Proteus.  10/7/2020: completed 5 day course of zyvox. Denies fevers chills nausea vomiting.  Seen during veraflo VAC and 2 layer compression wrap change.    10/14/2020: Patient denies fevers, chills, nausea, vomiting.  Patient wound is malodorous complaining of increased pain to the wound.  Increased slough noted to wound bed despite veraflo wound VAC.  Reconsult ID.  10/16/2020: Patient seen during wound VAC change with Miranda wound care PT. patient denies fever, chills, nausea, vomiting.  Patient reports pain to wound and increase in pain with wound VAC change.  Patient has granular tissue throughout wound with mild amount of slough to posterior aspect of leg.  Malodorous with VAC removal.  Wound VAC nursing changed.  10/29/2020: Wound VAC was discontinued by wound team on 10/23/2020 due to slow progress and collagen was started.  Patient has completed 7-day antibiotic course of meropenem for multidrug resistant Proteus vulgaris.  Patient found to have lice and has been treated.  Nonfoul-smelling odor present with dressing removal.  11/5/2020: Seen with wound team during 2 layer compression dressing change and for excisional debridement.  No odor noted today.  Thick layer of biofilm noted.  Wound edges continue to improve but still remain to medial aspect of wound.      Results:  CBC with differential 10/14/2020: WBC 6, H&H 11/33.9  Wound culture: 9/28/2020: Positive for beta-hemolytic strep group A, Proteus.  Sensitivities pending    Wound cx: 9/1/2020 mrsa, diphtheroids, beta hemolytic strep group A    8/7/2020: X-ray tib-fib left:  1.  Healed distal metaphyseal fractures of the left fibula and tibia with tibial IM layla in place.     2.  No acute fracture or dislocation.     3.  No radiopaque soft tissue foreign body.      Arterial studies:   9/2/2020  Right.    Doppler waveforms of the common femoral artery are of high amplitude and    triphasic.    Doppler waveforms at the ankle are  brisk and triphasic.    Ankle-brachial index is normal.       Left.    Doppler waveforms of the common femoral artery are of high amplitude and    triphasic.    Doppler waveforms at the ankle are brisk and triphasic.    Ankle-brachial index is normal.    Unable to obtained popliteal waveforms due to wound bandages.        Exam:  Left lower leg full-thickness ulcer  No odor noted today  Thick biofilm/slough primarily to lateral aspect of wound,     Tenderness improved   Thick closed wound edges to lateral edge of medial wound and to medial edge of lateral wound    Palpable PT pulse to left foot +1 foot   +2 DP left foot  Difficulty palpating popliteal due to scar tissue  No erythema  Able to extend left leg to 160 degrees. Able to flex left leg around 80 degrees        Pre-debridement left leg lateral view        Pre-debridement left medial lower leg                Pre-debridement measurements: 30 x 13.7 x 0.2 cm    DESCRIPTION OF PROCEDURE:  Excision of skin, subcutaneous tissue including chronic rolled wound edges to left lower leg ulcer     PMH, medications and recent labs reviewed        PROCEDURE: A formal timeout was performed to confirm patient's correct name, correct site, correct procedure and correct laterality.    Topical lidocaine applied topically and allowed to dwell for approximately 15 minutes prior to start of procedure.  -Curette  used to excise closed wound edges, slough and scar tissue from the wound bed.  Total area debrided, ~420 cm².  Debridement carried into the subcutaneous tissue layer.  Tendon exposed to proximal medial aspect of wound.  Tendon remains moist and intact.  -Bleeding controlled with manual pressure.    Wound care completed by wound RN- refer to flowsheet and note   Patient tolerated debridement.      Post debridement measurements: 30 x 14 x 0.3 cm     Post debridement left lower leg medial posterior post debride             Left leg posterior lateral post  debride                                      ASSESSMENT/PLAN:  65-year-old male with homelessness, EtOH use, tobacco use, and chronic left leg ulcer.  Closed wound edges are improved, but remain primarily to medial side of lateral wound and lateral side of medial wound.  Wound starting to contract at the popliteal fossa.      Excisional debridement medically necessary for wound healing.  Aquacel Ag dressing effective with absorbing drainage.  No odor noted today.  Continue with Aquacel Ag.    Plan for excisional debridement with injectable lidocaine at next change with APRN  -Vanessa, Aquacel Ag, 2 layer compression wrap.  Change 2 times a week by wound team  -No signs and symptoms of infection present today.  Completed antibiotic course on 10/22/2020.     - Patient to continue to be seen by wound care team while admitted  Patient to continue to be followed by wound care nurse practitioner as he requires serial excisional debridements        Discharge plan:  Pending guardianship      D/W: Patient, RN, Pamela wound care RN,  Clary

## 2020-11-07 PROCEDURE — A9270 NON-COVERED ITEM OR SERVICE: HCPCS | Performed by: NURSE PRACTITIONER

## 2020-11-07 PROCEDURE — 700102 HCHG RX REV CODE 250 W/ 637 OVERRIDE(OP): Performed by: NURSE PRACTITIONER

## 2020-11-07 PROCEDURE — 700101 HCHG RX REV CODE 250: Performed by: NURSE PRACTITIONER

## 2020-11-07 PROCEDURE — 770001 HCHG ROOM/CARE - MED/SURG/GYN PRIV*

## 2020-11-07 PROCEDURE — 99231 SBSQ HOSP IP/OBS SF/LOW 25: CPT | Performed by: NURSE PRACTITIONER

## 2020-11-07 RX ADMIN — DIVALPROEX SODIUM 125 MG: 125 CAPSULE ORAL at 04:51

## 2020-11-07 RX ADMIN — AMLODIPINE BESYLATE 5 MG: 5 TABLET ORAL at 04:51

## 2020-11-07 RX ADMIN — LIDOCAINE 1 PATCH: 50 PATCH CUTANEOUS at 12:40

## 2020-11-07 RX ADMIN — DIVALPROEX SODIUM 125 MG: 125 CAPSULE ORAL at 22:30

## 2020-11-07 RX ADMIN — GABAPENTIN 200 MG: 100 CAPSULE ORAL at 04:51

## 2020-11-07 RX ADMIN — OXYCODONE HYDROCHLORIDE 10 MG: 10 TABLET ORAL at 02:06

## 2020-11-07 RX ADMIN — CYCLOBENZAPRINE 10 MG: 10 TABLET, FILM COATED ORAL at 21:03

## 2020-11-07 RX ADMIN — RISPERIDONE 0.5 MG: 0.5 TABLET ORAL at 04:51

## 2020-11-07 RX ADMIN — GABAPENTIN 200 MG: 100 CAPSULE ORAL at 12:40

## 2020-11-07 RX ADMIN — DIVALPROEX SODIUM 125 MG: 125 CAPSULE ORAL at 16:09

## 2020-11-07 RX ADMIN — OXYCODONE HYDROCHLORIDE 10 MG: 10 TABLET ORAL at 10:23

## 2020-11-07 RX ADMIN — Medication 400 MG: at 17:46

## 2020-11-07 RX ADMIN — GABAPENTIN 200 MG: 100 CAPSULE ORAL at 17:46

## 2020-11-07 RX ADMIN — OXYCODONE HYDROCHLORIDE 10 MG: 10 TABLET ORAL at 17:46

## 2020-11-07 RX ADMIN — RISPERIDONE 1 MG: 1 TABLET ORAL at 17:46

## 2020-11-07 RX ADMIN — Medication 400 MG: at 04:51

## 2020-11-07 ASSESSMENT — ENCOUNTER SYMPTOMS
NERVOUS/ANXIOUS: 0
FOCAL WEAKNESS: 0
DIARRHEA: 0
ABDOMINAL PAIN: 0
FEVER: 0
CONSTIPATION: 0
INSOMNIA: 0
NAUSEA: 0
WEAKNESS: 0
SPEECH CHANGE: 0
COUGH: 0
DEPRESSION: 0
CHILLS: 0
SHORTNESS OF BREATH: 0
VOMITING: 0
PALPITATIONS: 0
HEADACHES: 0
SENSORY CHANGE: 0
DIZZINESS: 0

## 2020-11-07 ASSESSMENT — PAIN DESCRIPTION - PAIN TYPE
TYPE: CHRONIC PAIN

## 2020-11-07 NOTE — CARE PLAN
Problem: Safety  Goal: Will remain free from injury  Outcome: PROGRESSING AS EXPECTED   Patient has 1:1 safety sitter at bedside for safety/elopement risk.    Problem: Pain Management  Goal: Pain level will decrease to patient's comfort goal  Outcome: PROGRESSING AS EXPECTED   Patient taking oxycodone Q6hrs PRN, lidocaine patch, and using rest and distraction to manage pain.

## 2020-11-07 NOTE — PROGRESS NOTES
"Hospital Medicine Twice Weekly Progress Note    Date of Service  11/4/2020    Chief Complaint  LLE wound    Hospital Course  Mr. Varela is a 65-year-old male who presented to the emergency department with a left lower extremity wound infection. He was hospitalized at our facility in April and evaluated by orthopedic surgery who recommended wound care. Wound cultures at that time grew MSSA and Streptococcus. He had been seen at [facility] earlier that week and prescribed antibiotics, however he had not filled the prescription.  An ultrasound of that extremity was done and was negative for DVT.  He was treated for sepsis and completed an antibiotic course on 9/16/2020. He was also found to have head lice and underwent treatment for that.  A repeat wound culture was done on 9/28/2020 and grew Strep A and Proteus. Infectious disease was consulted and recommended a 5-day course of IV zosyn which was completed on 10/5/2020. On 10/12/2020 the wound care team noticed increased inflammation to the proximal part of the wound bed and switched from a vera flow wound VAC to a regular wound VAC.  ID was again consulted and on 10/14/2020 recommended to 7-day course of antibiotics with meropenem which was completed on 10/22/2020. Additionally, he was noted to have intermittent agitation so was started on risperdal, depakote, and gabapentin per psychiatric recommendations. He was deemed to be incapacitated to make medical decisions and is currently pending guardianship and placement, likely to a group home.     Interval Problem Update  11/7: One low BP yesterday (95/65) but asymptomatic. Requested increased oxycodone saying it helps \"keep me chill\". I told him oxycodone is used to treat pain not anxiety.       Consultants/Specialty  LPS  Infectious disease  Psychiatry    Code Status  Full Code    Disposition  Pending guardianship, then will likely pursue group home placement.     Review of Systems  Review of Systems " "  Constitutional: Negative for chills, fever and malaise/fatigue.   Respiratory: Negative for cough and shortness of breath.    Cardiovascular: Positive for leg swelling. Negative for chest pain and palpitations.   Gastrointestinal: Negative for abdominal pain, constipation, diarrhea, nausea and vomiting.   Genitourinary: Negative for dysuria, frequency and urgency.   Musculoskeletal:        Lower extremity pain, currently \"tolerable\".   Neurological: Negative for dizziness, sensory change, speech change, focal weakness, weakness and headaches.   Psychiatric/Behavioral: Negative for depression and suicidal ideas. The patient is not nervous/anxious and does not have insomnia.       Physical Exam  Temp:  [36.1 °C (96.9 °F)-36.4 °C (97.5 °F)] 36.1 °C (96.9 °F)  Pulse:  [73-99] 99  Resp:  [16-17] 17  BP: (101-112)/(73-74) 101/74  SpO2:  [94 %-95 %] 94 %    Physical Exam  Vitals signs and nursing note reviewed.   Constitutional:       General: He is awake. He is not in acute distress.     Appearance: Normal appearance. He is well-developed. He is not ill-appearing.   HENT:      Head: Normocephalic and atraumatic.      Mouth/Throat:      Lips: Pink.      Mouth: Mucous membranes are moist.   Eyes:      Conjunctiva/sclera: Conjunctivae normal.      Pupils: Pupils are equal, round, and reactive to light.   Neck:      Musculoskeletal: Normal range of motion and neck supple.   Cardiovascular:      Rate and Rhythm: Normal rate and regular rhythm.      Pulses: Normal pulses.      Heart sounds: Normal heart sounds.   Pulmonary:      Effort: Pulmonary effort is normal.      Breath sounds: Normal breath sounds.   Abdominal:      General: Bowel sounds are normal. There is no distension or abdominal bruit.      Palpations: Abdomen is soft.      Tenderness: There is no abdominal tenderness.   Musculoskeletal:      Right lower leg: No edema.      Left lower leg: No edema.   Skin:     General: Skin is warm and dry.      Findings: Wound " present.          Neurological:      General: No focal deficit present.      Mental Status: He is alert and oriented to person, place, and time.      GCS: GCS eye subscore is 4. GCS verbal subscore is 5. GCS motor subscore is 6.   Psychiatric:         Attention and Perception: Attention and perception normal.         Mood and Affect: Affect normal. Mood is depressed.         Speech: Speech normal.         Behavior: Behavior normal. Behavior is cooperative.         Thought Content: Thought content normal.         Cognition and Memory: Cognition and memory normal.         Judgment: Judgment normal.     Fluids    Intake/Output Summary (Last 24 hours) at 11/7/2020 1547  Last data filed at 11/7/2020 0900  Gross per 24 hour   Intake 960 ml   Output --   Net 960 ml     Laboratory    Imaging  IR-MIDLINE CATHETER INSERTION WO GUIDANCE > AGE 3   Final Result                  Ultrasound-guided midline placement performed by qualified nursing staff    as above.          IR-US GUIDED PIV   Final Result    Ultrasound-guided PERIPHERAL IV INSERTION performed by    qualified nursing staff as above.      IR-MIDLINE CATHETER INSERTION WO GUIDANCE > AGE 3   Final Result                  Ultrasound-guided midline placement performed by qualified nursing staff    as above.          US-KOSTAS SINGLE LEVEL BILAT   Final Result      CT-HEAD W/O   Final Result      No acute intracranial abnormality      DX-TIBIA AND FIBULA LEFT   Final Result      1.  Healed distal metaphyseal fractures of the left fibula and tibia with tibial IM layla in place.      2.  No acute fracture or dislocation.      3.  No radiopaque soft tissue foreign body.      DX-FEMUR-2+ LEFT   Final Result      1.  No radiographic evidence of acute traumatic injury left femur.      2.  Unchanged cortical thickening in the posterior aspect of the distal femoral diaphysis which may represent sequela of prior injury or infection.      3.  No soft tissue foreign body identified.       US-EXTREMITY VENOUS LOWER UNILAT LEFT   Final Result      DX-CHEST-PORTABLE (1 VIEW)   Final Result      No acute cardiopulmonary abnormality.         Assessment/Plan  Infection of wound due to methicillin resistant Staphylococcus aureus (MRSA)- (present on admission)  Assessment & Plan  -History of MRSA.  -Poor compliance with OP wound care.   -Continue wound care.  Wound VAC was discontinued on 10/30/2020.    Encephalopathy- (present on admission)  Assessment & Plan  -Acute on chronic, likely due Wernicke's.  -Psychiatry: incapacitated to make medical decision or leave AMA   -Kinship and placement pending.  -Avoid narcotics and hypnotics.    -Frequent reorientation.  -Sleep hygiene.    Non-compliance  Assessment & Plan  -Likely also component of psychiatric disorder and ETOH abuse.  Improved now the inpatient.  -Psychiatric consulted and suggests patient is incapacitated to make medical decisions or leave AMA.  -Continue ongoing encouragement for compliance.    Psychiatric disorder  Assessment & Plan  -Unknown/unspecified.  -Psychiatry consulted and deemed him incapacitated to leave AMA or make medical decisions.  -Continue Risperdal and Depakote.    Essential hypertension- (present on admission)  Assessment & Plan  -Well-controlled on current regimen.  Continue amlodipine.    Flexion contracture of knee, 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)  Assessment & Plan  -Secondary to chronic wound in that area.  -PT/OT.  -Encourage mobility.    Emphysema/COPD (HCC)  Assessment & Plan  -Chronic and stable, without acute exacerbation.    Alcohol dependence (HCC)- (present on admission)  Assessment & Plan  -History of. Abstinent since admission.    Protein malnutrition (HCC)  Assessment & Plan  -Moderate/severe.  -Body mass index is 21.26 kg/m². (Improving)  -Continue TID supplements.  -Encourage PO intake.    Tobacco dependence- (present on admission)  Assessment & Plan  -Abstinent since admission.     VTE prophylaxis:  Ambulation

## 2020-11-07 NOTE — CARE PLAN
Problem: Safety  Goal: Will remain free from injury  Outcome: PROGRESSING AS EXPECTED   Patient remains free from injury. 1:1 safety sitter at bedside.     Problem: Fluid Volume:  Goal: Will maintain balanced intake and output  Outcome: PROGRESSING AS EXPECTED   Patient tolerating PO  fluid intake.     Problem: Pain Management  Goal: Pain level will decrease to patient's comfort goal  Outcome: PROGRESSING AS EXPECTED   Patient medicated per MAR  for complaints of pain with adequate relief.

## 2020-11-08 PROCEDURE — 770001 HCHG ROOM/CARE - MED/SURG/GYN PRIV*

## 2020-11-08 PROCEDURE — 700102 HCHG RX REV CODE 250 W/ 637 OVERRIDE(OP): Performed by: NURSE PRACTITIONER

## 2020-11-08 PROCEDURE — A9270 NON-COVERED ITEM OR SERVICE: HCPCS | Performed by: NURSE PRACTITIONER

## 2020-11-08 PROCEDURE — 700101 HCHG RX REV CODE 250: Performed by: NURSE PRACTITIONER

## 2020-11-08 RX ADMIN — GABAPENTIN 200 MG: 100 CAPSULE ORAL at 16:50

## 2020-11-08 RX ADMIN — HYDROXYZINE HYDROCHLORIDE 25 MG: 50 TABLET, FILM COATED ORAL at 20:40

## 2020-11-08 RX ADMIN — OXYCODONE HYDROCHLORIDE 10 MG: 10 TABLET ORAL at 00:36

## 2020-11-08 RX ADMIN — Medication 400 MG: at 16:50

## 2020-11-08 RX ADMIN — DIVALPROEX SODIUM 125 MG: 125 CAPSULE ORAL at 07:05

## 2020-11-08 RX ADMIN — OXYCODONE HYDROCHLORIDE 10 MG: 10 TABLET ORAL at 13:22

## 2020-11-08 RX ADMIN — RISPERIDONE 1 MG: 1 TABLET ORAL at 16:50

## 2020-11-08 RX ADMIN — OXYCODONE HYDROCHLORIDE 10 MG: 10 TABLET ORAL at 07:10

## 2020-11-08 RX ADMIN — GABAPENTIN 200 MG: 100 CAPSULE ORAL at 07:05

## 2020-11-08 RX ADMIN — OXYCODONE HYDROCHLORIDE 10 MG: 10 TABLET ORAL at 20:36

## 2020-11-08 RX ADMIN — GABAPENTIN 200 MG: 100 CAPSULE ORAL at 13:22

## 2020-11-08 RX ADMIN — RISPERIDONE 0.5 MG: 0.5 TABLET ORAL at 07:05

## 2020-11-08 RX ADMIN — Medication 400 MG: at 07:05

## 2020-11-08 RX ADMIN — LIDOCAINE 1 PATCH: 50 PATCH CUTANEOUS at 13:23

## 2020-11-08 RX ADMIN — DIVALPROEX SODIUM 125 MG: 125 CAPSULE ORAL at 20:37

## 2020-11-08 RX ADMIN — DIVALPROEX SODIUM 125 MG: 125 CAPSULE ORAL at 13:22

## 2020-11-08 ASSESSMENT — PAIN DESCRIPTION - PAIN TYPE
TYPE: CHRONIC PAIN

## 2020-11-08 NOTE — PROGRESS NOTES
Problem: Safety  Goal: Will remain free from falls  Outcome: PROGRESSING AS EXPECTED   Safety precautions in place.  Telesitter in use. Call light and personal items within reach. Bed at lowest position and locked.  Siderails up X 2.  Clutter free environment & adequate lighting. Educated on level of risk and reminded to call for assistance.  Hourly rounding in effect.     Problem: Infection  Goal: Will remain free from infection  Outcome: PROGRESSING AS EXPECTED   Afebrile. Standard precautions in effect.  Hand washing every encounter, and before & after patient care.  Verbalized understanding.     Problem: Knowledge Deficit  Goal: Knowledge of disease process/condition, treatment plan, diagnostic tests, and medications will improve  Outcome: PROGRESSING AS EXPECTED   Discussed plan of care.  Questions answered.  Verbalized understanding.     Problem: Mobility  Goal: Risk for activity intolerance will decrease  Outcome: PROGRESSING AS EXPECTED    Educated on ROM exercises, risks and benefits.  Verbalized understanding.

## 2020-11-08 NOTE — CARE PLAN
Problem: Safety  Goal: Will remain free from injury  Outcome: PROGRESSING AS EXPECTED  Patient had tele sitter in place.      Problem: Pain Management  Goal: Pain level will decrease to patient's comfort goal  Outcome: PROGRESSING AS EXPECTED   Pain controlled with prn and standing medication thus far.

## 2020-11-09 PROCEDURE — A9270 NON-COVERED ITEM OR SERVICE: HCPCS | Performed by: NURSE PRACTITIONER

## 2020-11-09 PROCEDURE — 700102 HCHG RX REV CODE 250 W/ 637 OVERRIDE(OP): Performed by: NURSE PRACTITIONER

## 2020-11-09 PROCEDURE — 700101 HCHG RX REV CODE 250: Performed by: NURSE PRACTITIONER

## 2020-11-09 PROCEDURE — 770001 HCHG ROOM/CARE - MED/SURG/GYN PRIV*

## 2020-11-09 RX ADMIN — DIVALPROEX SODIUM 125 MG: 125 CAPSULE ORAL at 21:47

## 2020-11-09 RX ADMIN — RISPERIDONE 0.5 MG: 0.5 TABLET ORAL at 06:33

## 2020-11-09 RX ADMIN — OXYCODONE HYDROCHLORIDE 10 MG: 10 TABLET ORAL at 14:06

## 2020-11-09 RX ADMIN — AMLODIPINE BESYLATE 5 MG: 5 TABLET ORAL at 06:34

## 2020-11-09 RX ADMIN — DIVALPROEX SODIUM 125 MG: 125 CAPSULE ORAL at 06:33

## 2020-11-09 RX ADMIN — OXYCODONE HYDROCHLORIDE 10 MG: 10 TABLET ORAL at 06:33

## 2020-11-09 RX ADMIN — RISPERIDONE 1 MG: 1 TABLET ORAL at 16:26

## 2020-11-09 RX ADMIN — GABAPENTIN 200 MG: 100 CAPSULE ORAL at 06:33

## 2020-11-09 RX ADMIN — OXYCODONE HYDROCHLORIDE 10 MG: 10 TABLET ORAL at 21:47

## 2020-11-09 RX ADMIN — GABAPENTIN 200 MG: 100 CAPSULE ORAL at 12:31

## 2020-11-09 RX ADMIN — Medication 400 MG: at 16:23

## 2020-11-09 RX ADMIN — LIDOCAINE 1 PATCH: 50 PATCH CUTANEOUS at 12:30

## 2020-11-09 RX ADMIN — POLYETHYLENE GLYCOL 3350 1 PACKET: 17 POWDER, FOR SOLUTION ORAL at 16:22

## 2020-11-09 RX ADMIN — Medication 400 MG: at 06:34

## 2020-11-09 RX ADMIN — DIVALPROEX SODIUM 125 MG: 125 CAPSULE ORAL at 14:06

## 2020-11-09 RX ADMIN — GABAPENTIN 200 MG: 100 CAPSULE ORAL at 16:23

## 2020-11-09 NOTE — DISCHARGE PLANNING
Anticipated Discharge Disposition:Guardianship then GH    Action: Cont to await Guardiansip.    Barriers to Discharge: Pending guardianship and then eventual [facility] placement.    Plan: Cont to F/U on [facility].

## 2020-11-09 NOTE — PROGRESS NOTES
Pharmacy Pharmacotherapy Consult for LOS >30 days    Admit Date: 8/7/2020      Medications were reviewed for appropriateness and ongoing need.     Current Facility-Administered Medications   Medication Dose Route Frequency Provider Last Rate Last Admin   • oxyCODONE immediate release (ROXICODONE) tablet 10 mg  10 mg Oral Q6HRS PRN Jeovanny Andres, A.P.R.N.   10 mg at 11/09/20 0633   • gabapentin (NEURONTIN) capsule 200 mg  200 mg Oral TID Gissellesa YADIRA Bowensson, A.P.R.N.   200 mg at 11/09/20 0633   • cyclobenzaprine (Flexeril) tablet 10 mg  10 mg Oral TID PRN Gissellesa YADIRA Bowensson, A.P.R.N.   10 mg at 11/07/20 2103   • lidocaine (LIDODERM) 5 % 1 Patch  1 Patch Transdermal Q24HR Gisselle YADIRA BowensSinclair, A.P.R.N.   1 Patch at 11/08/20 1323   • hydrOXYzine HCl (ATARAX) tablet 25 mg  25 mg Oral TID PRN Gisselle Bowensson, A.P.R.N.   25 mg at 11/08/20 2040   • senna-docusate (PERICOLACE or SENOKOT S) 8.6-50 MG per tablet 2 Tab  2 Tab Oral BID PRN Demarcus Mccabe, A.P.N.   2 Tab at 10/18/20 0550    And   • polyethylene glycol/lytes (MIRALAX) PACKET 1 Packet  1 Packet Oral QDAY PRN Demarcus Mccabe, A.P.N.   1 Packet at 09/27/20 0508   • risperiDONE (RISPERDAL) tablet 0.5 mg  0.5 mg Oral DAILY Demarcus LILIAN Olddallas, A.P.N.   0.5 mg at 11/09/20 0633   • risperiDONE (RISPERDAL) tablet 1 mg  1 mg Oral Q EVENING Demarcus Mccabe, A.P.N.   1 mg at 11/08/20 1650   • haloperidol lactate (HALDOL) injection 2-5 mg  2-5 mg Intramuscular Q4HRS PRN Demarcus QUINTANA Olde, A.P.N.   5 mg at 11/05/20 1339   • divalproex (DEPAKOTE SPRINKLE) capsule 125 mg  125 mg Oral Q8HRS Jeovannykaryn Simmonsnk, A.P.R.N.   125 mg at 11/09/20 0633   • magnesium oxide (MAG-OX) tablet 400 mg  400 mg Oral BID Jeovanny Dmitry, A.P.R.N.   400 mg at 11/09/20 0634   • amLODIPine (NORVASC) tablet 5 mg  5 mg Oral Q DAY Demarcus Mccabe, A.P.N.   5 mg at 11/09/20 0634       Recommendations:  Patient qualifies for seaonsal flu shot and prevnar 13 prior to discharge.   No further recommendations.    Maverick Jackson,  PharmD

## 2020-11-09 NOTE — PROGRESS NOTES
0658 Received report from night RN Kayla, POC discussed. Call light in place, bed lowered and locked. Encourage pt to call if needed anything. Pt A&Ox2, confused with time and place, telesitter in place, dressing on LLE is CD&I

## 2020-11-09 NOTE — CARE PLAN
Problem: Safety  Goal: Will remain free from injury  Outcome: PROGRESSING AS EXPECTED     Problem: Infection  Goal: Will remain free from infection  Outcome: PROGRESSING AS EXPECTED     Problem: Venous Thromboembolism (VTW)/Deep Vein Thrombosis (DVT) Prevention:  Goal: Patient will participate in Venous Thrombosis (VTE)/Deep Vein Thrombosis (DVT)Prevention Measures  Outcome: PROGRESSING AS EXPECTED     Problem: Knowledge Deficit  Goal: Knowledge of disease process/condition, treatment plan, diagnostic tests, and medications will improve  Outcome: PROGRESSING AS EXPECTED

## 2020-11-10 PROCEDURE — 700102 HCHG RX REV CODE 250 W/ 637 OVERRIDE(OP): Performed by: NURSE PRACTITIONER

## 2020-11-10 PROCEDURE — A9270 NON-COVERED ITEM OR SERVICE: HCPCS | Performed by: NURSE PRACTITIONER

## 2020-11-10 PROCEDURE — 700101 HCHG RX REV CODE 250: Performed by: NURSE PRACTITIONER

## 2020-11-10 PROCEDURE — 770001 HCHG ROOM/CARE - MED/SURG/GYN PRIV*

## 2020-11-10 PROCEDURE — 29581 APPL MULTLAYER CMPRN SYS LEG: CPT

## 2020-11-10 RX ADMIN — RISPERIDONE 0.5 MG: 0.5 TABLET ORAL at 04:40

## 2020-11-10 RX ADMIN — GABAPENTIN 200 MG: 100 CAPSULE ORAL at 16:44

## 2020-11-10 RX ADMIN — DIVALPROEX SODIUM 125 MG: 125 CAPSULE ORAL at 21:01

## 2020-11-10 RX ADMIN — Medication 400 MG: at 04:40

## 2020-11-10 RX ADMIN — LIDOCAINE 1 PATCH: 50 PATCH CUTANEOUS at 12:00

## 2020-11-10 RX ADMIN — OXYCODONE HYDROCHLORIDE 10 MG: 10 TABLET ORAL at 04:40

## 2020-11-10 RX ADMIN — DIVALPROEX SODIUM 125 MG: 125 CAPSULE ORAL at 15:33

## 2020-11-10 RX ADMIN — GABAPENTIN 200 MG: 100 CAPSULE ORAL at 04:40

## 2020-11-10 RX ADMIN — Medication 400 MG: at 16:44

## 2020-11-10 RX ADMIN — OXYCODONE HYDROCHLORIDE 10 MG: 10 TABLET ORAL at 15:33

## 2020-11-10 RX ADMIN — GABAPENTIN 200 MG: 100 CAPSULE ORAL at 11:56

## 2020-11-10 RX ADMIN — AMLODIPINE BESYLATE 5 MG: 5 TABLET ORAL at 04:40

## 2020-11-10 RX ADMIN — DIVALPROEX SODIUM 125 MG: 125 CAPSULE ORAL at 04:40

## 2020-11-10 RX ADMIN — OXYCODONE HYDROCHLORIDE 10 MG: 10 TABLET ORAL at 21:36

## 2020-11-10 ASSESSMENT — PAIN DESCRIPTION - PAIN TYPE
TYPE: CHRONIC PAIN

## 2020-11-10 NOTE — WOUND TEAM
[facility]  Inpatient Services  Wound and Skin Care Progress Note    Admission Date: 8/7/2020     Last order of IP CONSULT TO WOUND CARE was found on 9/1/2020 from Hospital Encounter on 8/7/2020     HPI, PMH, SH: Reviewed    Unit where seen by Wound Team: [id number]    WOUND CONSULT/FOLLOW UP RELATED TO:  Left leg follow-up    Self Report / Pain Level: pain with debridement, topical lidocaine used    OBJECTIVE:  2 layer wrap intact, sacral mepilex on left LE off. Tele Sitter in place and on.     WOUND TYPE, LOCATION, CHARACTERISTICS (Pressure Injuries: location, stage, POA or date identified)       Skin Risk/[place]   Sensory Perception: Slightly Limited  Moisture: Rarely Moist  Activity: Walks Frequently  Mobility: No Limitations  Nutrition: Adequate  Friction and Shear: No Apparent Problem  Total Score: 21  Skin Breakdown Risk: 18-23 Minimal Risk for Skin Breakdown    Sensory Interventions  Bed Types: Pressure Redistribution Mattress (Atmosair)  Skin Preventative Measures: Pillows in Use for Support / Positioning  Skin Preventative Dressing: Mepilex  Moisturizers/Barriers: Moisturizer   PT / OT Involved in Care: Physical Therapy Involved, Occupational Therapy Involved  Activity : Ambulated  Patient Turns / Repositioning: Patient Turns Self from Side to Side  Patient is Receiving Nutrition: Oral Intake Adequate  Nutrition Consult Ordered: No, Consult has Already been Placed  Vitamin Therapy in Use: No  Friction Interventions: Draw Sheet / Pad Used for Repositioning                         Wound 08/07/20 Full Thickness Wound Leg LLE posterior, extends medially and laterally (Active)   Wound Image    11/05/20 1400   Site Assessment Pink;Red 11/10/20 1400   Periwound Assessment Clean;Dry;Intact;Scar tissue 11/10/20 1400   Margins Attached edges;Defined edges 11/10/20 1400   Closure Secondary intention 11/10/20 1400   Drainage Amount Large 11/10/20 1400   Drainage Description Serosanguineous 11/10/20 1400    Treatments Cleansed;Irrigation;Site care;Compression 11/10/20 1400   Wound Cleansing Approved Wound Cleanser 11/10/20 1400   Periwound Protectant Not Applicable 11/10/20 1400   Dressing Cleansing/Solutions Not Applicable 11/10/20 1400   Dressing Options Hydrofiber Silver;Absorbent Abdominal Pad;Compression Wrap Two Layer;Mepilex 11/10/20 1400   Dressing Changed Changed 11/10/20 1400   Dressing Status Clean;Dry;Intact 11/10/20 1400   Dressing Change/Treatment Frequency By Wound Team Only 11/10/20 1400   NEXT Dressing Change/Treatment Date 11/14/20 11/10/20 1400   NEXT Weekly Photo (Inpatient Only) 11/14/20 11/10/20 1400   Non-staged Wound Description Full thickness 11/05/20 1400   Wound Length (cm) 30 cm 11/05/20 1400   Wound Width (cm) 13.7 cm 11/05/20 1400   Wound Depth (cm) 0.2 cm 11/05/20 1400   Wound Surface Area (cm^2) 411 cm^2 11/05/20 1400   Wound Volume (cm^3) 82.2 cm^3 11/05/20 1400   Post-Procedure Length (cm) 30 cm 11/05/20 1400   Post-Procedure Width (cm) 14.1 cm 11/05/20 1400   Post-Procedure Depth (cm) 0.2 cm 11/05/20 1400   Post-Procedure Surface Area (cm^2) 423 cm^2 11/05/20 1400   Post-Procedure Volume (cm^3) 84.6 cm^3 11/05/20 1400   Wound Healing % -56 11/05/20 1400   Wound Bed Granulation (%) 80 % 10/23/20 1800   Wound Bed Epithelium (%) 0 % 09/01/20 1300   Wound Bed Slough (%) 20 % 10/23/20 1800   Wound Bed Granulation (%) - Post-Procedure 100 % 11/05/20 1400   Wound Bed Epithelium % - (Post-Procedure) 0 % 09/01/20 1300   Wound Bed Slough % - (Post-Procedure) 0 % 10/07/20 1100   Wound Bed Eschar % - (Post-Procedure) 0 % 09/01/20 1300   Tunneling (cm) 0 cm 09/03/20 1300   Undermining (cm) 0 cm 11/05/20 1400   Shape Irregular 11/10/20 1400   Wound Odor None 11/10/20 1400   Pulses Left;2+;DP;PT 11/10/20 1400   Exposed Structures Tendon 11/05/20 1400   WOUND NURSE ONLY - Time Spent with Patient (mins) 30 11/10/20 1400            VASCULAR    KOSTAS:   KOSTAS Results, Last 30 Days Us-kostas Single Level  Bilat    Result Date: 2020  Narrative  Vascular Laboratory  Conclusions  1.  Normal bilateral KOSTAS's.  GRUPO GANN  Age:    64    Gender:     M  MRN:    [id number]  :    1955      BSA:  Exam Date:     2020 09:59  Room #:     Inpatient  Priority:     Routine  Ht (in):             Wt (lb):  Ordering Physician:        EDDA CANADA  Referring Physician:       [id number] Morrissey  Sonographer:               Deja Ellis RDMS                             RVT  Study Type:                Complete Bilateral  Technical Quality:         Adequate  Indications:     Ulceration of LE  CPT Codes:       42558  ICD Codes:       707.1  History:         Nonhealing wound of left lower extremity.  Limitations:                 RIGHT  Waveform            Systolic BPs (mmHg)                             117           Brachial  Triphasic                                Common Femoral  Triphasic                  138           Posterior Tibial  Triphasic                  132           Dorsalis Pedis                                           Peroneal                             1.18          KOSTAS                                           TBI                       LEFT  Waveform        Systolic BPs (mmHg)                             114           Brachial  Triphasic                                Common Femoral  Triphasic                  127           Posterior Tibial  Triphasic                  122           Dorsalis Pedis                                           Peroneal                             1.09          KOSTAS                                           TBI  Findings  Right.  Doppler waveforms of the common femoral artery are of high amplitude and  triphasic.  Doppler waveforms at the ankle are brisk and triphasic.  Ankle-brachial index is normal.  Left.  Doppler waveforms of the common femoral artery are of high amplitude and  triphasic.  Doppler  waveforms at the ankle are brisk and triphasic.  Ankle-brachial index is normal.  Unable to obtained popliteal waveforms due to wound bandages.  Additional testing was not performed in accordance with lower extremity  arterial evaluation protocol.  Clover VINCENT Zulma  (Electronically Signed)  Final Date:      02 September 2020                   11:14    Lab Values:    Lab Results   Component Value Date/Time    WBC 6.0 10/14/2020 11:43 AM    RBC 3.87 (L) 10/14/2020 11:43 AM    HEMOGLOBIN 11.0 (L) 10/14/2020 11:43 AM    HEMATOCRIT 33.9 (L) 10/14/2020 11:43 AM    CREACTPROT 1.07 (H) 08/12/2020 01:55 PM    SEDRATEWES 85 (H) 08/07/2020 01:20 PM    HBA1C 5.7 (H) 11/09/2018 06:30 PM        Culture Results show:  Recent Results (from the past 720 hour(s))   CULTURE WOUND W/ GRAM STAIN    Collection Time: 09/01/20  2:00 PM    Specimen: Left Leg; Wound   Result Value Ref Range    Significant Indicator POS (POS)     Source WND     Site LEFT LEG     Culture Result - (A)     Gram Stain Result       Moderate Gram positive cocci.  Moderate Gram positive rods.      Culture Result (A)      Beta Hemolytic Streptococcus group A  Moderate growth      Culture Result (A)      Methicillin Resistant Staphylococcus aureus  Moderate growth      Culture Result (A)      Diphtheroids  Moderate growth  Mixed morphologies.         INTERVENTIONS BY WOUND TEAM:    Dressings removed and wound and addison cleansed with wound cleanse. Wound then cleansed with NS and gauze, patted dry with gauze.  Collagen dressing onto wound beds allowing pt's wound drainage to activate product, covered with Aquacel Ag then ABD to leg aspect and mepilex to thigh aspect of this long wound.  2 layer wrap to left LE.       Interdisciplinary consultation: Patient, Bedside RN    EVALUATION / RATIONALE FOR TREATMENT:    11/10: APRN unavailable at this time.  Will re-schedule excisional debridement to next week.   11/5: Plan for debridement of scarred edges on Tues by LPS. Tendon  exposed to posterior aspect of wound, behind knee. Continue with current dressing care.  10/29: Excisional debridement by Asaf ELLER of scar tissue along the proximal edge of the posterior knee and lateral thigh.  Lateral aspect of thigh is flatten.  Medial aspect of knee has thick scar tissue that was excisionally debrided by Asaf ELLER.  Fully granulated throughout the wound bed.   10/23 wound bed mostly granular, superficial in depth, progress has been slow with the wound VAC so will trial collagen product to encourage new tissue growth.    10/19: wound bed has improved in color and is fully granulated.  Addison-wound has improved, denudation has resolved. APRN continuing with serial weekly debridements as needed.   10/16: wound friable pt now on iv abx per ID.  Indurated edges addressed with drape and foam.  Addison wound likely has yeast infection - addressing with silver powder crusting  10/14: patient seen with Asaf ELLER, stopping veraflo instillation.  Starting silver powder and regular wound VAC to see if it will help with bioburden.  Possible colonization of bacteria.  ID involved.   10/12: Inflammation noted to the proximal part of the wound bed.  Will continue to monitor, possible vac break on Wednesday to help addison-skin inflammation.   10/7: Excisional debridement performed by Asaf ELLER. Will need to continue selective debridement with NPWT changes, and weekly excisional debridement with APRN to flatten out the scar tissue.  IV abx therapy ended 10/5.   10/5: Lateral wound still with some slough, both wounds smaller per measurements. Medial wound with all granular tissue.  9/30: Patient to start abx therapy for 7 days course per Dr. Ellis.  Started dakin's instillation to veraflo  9/28: malodorous today, culture taken.    9/25: Odor noted, though unclear if from wound or pt, consider shower before next Vac change. Consider regular vac next change, wound granular and  superficial.   9/23: Patient still awaiting guardianship for placement.   9/18: wound granulating nicely and responding well to current treatment.    9/16: Wound bed with granular tissue, edges are thick but appear better than previous assessments. . NPWT VF to encourage closure by secondary intention and manage drainage while encouraging oxygenation and granulation to the wound bed. 2 layer compression to assist in decrease of swelling and encourage blood flow to wounds. Wound team to follow up and change 3x/week.      Goals: Steady decrease in wound area and depth weekly.    NURSING PLAN OF CARE ORDERS (X):    Dressing changes: See Dressing Care orders: X  Skin care: See Skin Care orders:   Rectal tube care: See Rectal Tube Care orders:   Other orders:      WOUND TEAM PLAN OF CARE:   Dressing changes by wound team:     X, 2 layer compression wrap 2x/weekly due to drainage.   Follow up 3 times weekly:                NPWT change 3 times weekly:    Follow up 1-2 times weekly:      Follow up Bi-Monthly:                   Follow up as needed:       Other (explain):     Anticipated discharge plans:  LTACH:        SNF/Rehab: X - patient will need ongoing wound care for LLE upon discharge - 2x/weekly           Home Health Care:           Outpatient Wound Center:            Self Care:

## 2020-11-11 PROCEDURE — A9270 NON-COVERED ITEM OR SERVICE: HCPCS | Performed by: NURSE PRACTITIONER

## 2020-11-11 PROCEDURE — 770001 HCHG ROOM/CARE - MED/SURG/GYN PRIV*

## 2020-11-11 PROCEDURE — 700102 HCHG RX REV CODE 250 W/ 637 OVERRIDE(OP): Performed by: NURSE PRACTITIONER

## 2020-11-11 PROCEDURE — 99231 SBSQ HOSP IP/OBS SF/LOW 25: CPT | Performed by: NURSE PRACTITIONER

## 2020-11-11 RX ADMIN — GABAPENTIN 200 MG: 100 CAPSULE ORAL at 04:54

## 2020-11-11 RX ADMIN — RISPERIDONE 0.5 MG: 0.5 TABLET ORAL at 04:54

## 2020-11-11 RX ADMIN — CYCLOBENZAPRINE 10 MG: 10 TABLET, FILM COATED ORAL at 14:51

## 2020-11-11 RX ADMIN — GABAPENTIN 200 MG: 100 CAPSULE ORAL at 12:10

## 2020-11-11 RX ADMIN — GABAPENTIN 200 MG: 100 CAPSULE ORAL at 18:21

## 2020-11-11 RX ADMIN — DIVALPROEX SODIUM 125 MG: 125 CAPSULE ORAL at 21:00

## 2020-11-11 RX ADMIN — OXYCODONE HYDROCHLORIDE 10 MG: 10 TABLET ORAL at 12:10

## 2020-11-11 RX ADMIN — RISPERIDONE 1 MG: 1 TABLET ORAL at 18:21

## 2020-11-11 RX ADMIN — AMLODIPINE BESYLATE 5 MG: 5 TABLET ORAL at 04:54

## 2020-11-11 RX ADMIN — Medication 400 MG: at 04:54

## 2020-11-11 RX ADMIN — OXYCODONE HYDROCHLORIDE 10 MG: 10 TABLET ORAL at 18:21

## 2020-11-11 RX ADMIN — DIVALPROEX SODIUM 125 MG: 125 CAPSULE ORAL at 04:54

## 2020-11-11 RX ADMIN — DIVALPROEX SODIUM 125 MG: 125 CAPSULE ORAL at 14:51

## 2020-11-11 RX ADMIN — Medication 400 MG: at 18:21

## 2020-11-11 ASSESSMENT — ENCOUNTER SYMPTOMS
SENSORY CHANGE: 0
WEAKNESS: 0
PALPITATIONS: 0
CONSTIPATION: 0
VOMITING: 0
NAUSEA: 0
INSOMNIA: 0
ABDOMINAL PAIN: 0
DEPRESSION: 0
FOCAL WEAKNESS: 0
DIZZINESS: 0
FEVER: 0
COUGH: 0
SHORTNESS OF BREATH: 0
SPEECH CHANGE: 0
NERVOUS/ANXIOUS: 0
HEADACHES: 0
DIARRHEA: 0
CHILLS: 0

## 2020-11-11 NOTE — PROGRESS NOTES
Report received. Assumed care. telesitter in place. Pt in bed. A/O x4. VSS. Responds appropriately. Denies chest pain and SOB. Assessment complete. Discussed POC, , pt verbalizes understanding. Explained importance of calling before getting OOB. Call light and belongings within reach. Bed in the lowest position. Treaded socks in place. Hourly rounding in progress.

## 2020-11-11 NOTE — CARE PLAN
Problem: Safety  Goal: Will remain free from falls  Outcome: PROGRESSING AS EXPECTED     Problem: Infection  Goal: Will remain free from infection  Outcome: PROGRESSING AS EXPECTED     Problem: Knowledge Deficit  Goal: Knowledge of the prescribed therapeutic regimen will improve  Outcome: PROGRESSING AS EXPECTED

## 2020-11-12 PROCEDURE — A9270 NON-COVERED ITEM OR SERVICE: HCPCS | Performed by: NURSE PRACTITIONER

## 2020-11-12 PROCEDURE — 700101 HCHG RX REV CODE 250: Performed by: NURSE PRACTITIONER

## 2020-11-12 PROCEDURE — 700102 HCHG RX REV CODE 250 W/ 637 OVERRIDE(OP): Performed by: NURSE PRACTITIONER

## 2020-11-12 PROCEDURE — 770001 HCHG ROOM/CARE - MED/SURG/GYN PRIV*

## 2020-11-12 RX ADMIN — LIDOCAINE 1 PATCH: 50 PATCH CUTANEOUS at 12:50

## 2020-11-12 RX ADMIN — OXYCODONE HYDROCHLORIDE 10 MG: 10 TABLET ORAL at 20:26

## 2020-11-12 RX ADMIN — Medication 400 MG: at 06:21

## 2020-11-12 RX ADMIN — RISPERIDONE 1 MG: 1 TABLET ORAL at 17:57

## 2020-11-12 RX ADMIN — OXYCODONE HYDROCHLORIDE 10 MG: 10 TABLET ORAL at 06:21

## 2020-11-12 RX ADMIN — RISPERIDONE 0.5 MG: 0.5 TABLET ORAL at 06:21

## 2020-11-12 RX ADMIN — DIVALPROEX SODIUM 125 MG: 125 CAPSULE ORAL at 14:18

## 2020-11-12 RX ADMIN — DIVALPROEX SODIUM 125 MG: 125 CAPSULE ORAL at 06:21

## 2020-11-12 RX ADMIN — GABAPENTIN 200 MG: 100 CAPSULE ORAL at 17:57

## 2020-11-12 RX ADMIN — Medication 400 MG: at 17:57

## 2020-11-12 RX ADMIN — GABAPENTIN 200 MG: 100 CAPSULE ORAL at 12:49

## 2020-11-12 RX ADMIN — AMLODIPINE BESYLATE 5 MG: 5 TABLET ORAL at 06:21

## 2020-11-12 RX ADMIN — OXYCODONE HYDROCHLORIDE 10 MG: 10 TABLET ORAL at 00:25

## 2020-11-12 RX ADMIN — GABAPENTIN 200 MG: 100 CAPSULE ORAL at 06:21

## 2020-11-12 RX ADMIN — OXYCODONE HYDROCHLORIDE 10 MG: 10 TABLET ORAL at 12:49

## 2020-11-12 RX ADMIN — DIVALPROEX SODIUM 125 MG: 125 CAPSULE ORAL at 20:26

## 2020-11-12 ASSESSMENT — PAIN DESCRIPTION - PAIN TYPE: TYPE: CHRONIC PAIN

## 2020-11-12 NOTE — PROGRESS NOTES
Hospital Medicine Twice Weekly Progress Note    Date of Service  11/4/2020    Chief Complaint  LLE wound    Hospital Course  Mr. Varela is a 65-year-old male who presented to the emergency department with a left lower extremity wound infection. He was hospitalized at our facility in April and evaluated by orthopedic surgery who recommended wound care. Wound cultures at that time grew MSSA and Streptococcus. He had been seen at [facility] earlier that week and prescribed antibiotics, however he had not filled the prescription.  An ultrasound of that extremity was done and was negative for DVT.  He was treated for sepsis and completed an antibiotic course on 9/16/2020. He was also found to have head lice and underwent treatment for that.  A repeat wound culture was done on 9/28/2020 and grew Strep A and Proteus. Infectious disease was consulted and recommended a 5-day course of IV zosyn which was completed on 10/5/2020. On 10/12/2020 the wound care team noticed increased inflammation to the proximal part of the wound bed and switched from a vera flow wound VAC to a regular wound VAC.  ID was again consulted and on 10/14/2020 recommended to 7-day course of antibiotics with meropenem which was completed on 10/22/2020. Additionally, he was noted to have intermittent agitation so was started on risperdal, depakote, and gabapentin per psychiatric recommendations. He was deemed to be incapacitated to make medical decisions and is currently pending guardianship and placement, likely to a group home.     Interval Problem Update  Patient is sitting upright in a chair, fully dressed, pleasant, cooperative and fully oriented.  He endorses pain in his left lower extremity.  He denies any other problems.  Nursing reports the patient was dancing earlier.  -Okay for patient to be without IV access  -Excisional debridements per wound care nurse practitioner.  Per those notes, wound is improving.  -Submitted for guardianship  "9/7    Consultants/Specialty  LPS  Infectious disease  Psychiatry    Code Status  Full Code    Disposition  Pending guardianship, then will likely pursue group home placement.     Review of Systems  Review of Systems   Constitutional: Negative for chills, fever and malaise/fatigue.   Respiratory: Negative for cough and shortness of breath.    Cardiovascular: Positive for leg swelling. Negative for chest pain and palpitations.   Gastrointestinal: Negative for abdominal pain, constipation, diarrhea, nausea and vomiting.   Genitourinary: Negative for dysuria, frequency and urgency.   Musculoskeletal:        Lower extremity pain, currently \"tolerable\".   Neurological: Negative for dizziness, sensory change, speech change, focal weakness, weakness and headaches.   Psychiatric/Behavioral: Negative for depression and suicidal ideas. The patient is not nervous/anxious and does not have insomnia.       Physical Exam  Temp:  [36.4 °C (97.5 °F)-36.6 °C (97.9 °F)] 36.4 °C (97.5 °F)  Pulse:  [75-78] 78  Resp:  [16-17] 16  BP: (104-142)/(59-92) 104/66  SpO2:  [93 %-96 %] 93 %    Physical Exam  Vitals signs and nursing note reviewed.   Constitutional:       General: He is awake. He is not in acute distress.     Appearance: Normal appearance. He is well-developed. He is not ill-appearing.   HENT:      Head: Normocephalic and atraumatic.      Mouth/Throat:      Lips: Pink.      Mouth: Mucous membranes are moist.   Eyes:      Conjunctiva/sclera: Conjunctivae normal.      Pupils: Pupils are equal, round, and reactive to light.   Neck:      Musculoskeletal: Normal range of motion and neck supple.   Cardiovascular:      Rate and Rhythm: Normal rate and regular rhythm.      Pulses: Normal pulses.      Heart sounds: Normal heart sounds.   Pulmonary:      Effort: Pulmonary effort is normal.      Breath sounds: Normal breath sounds.   Abdominal:      General: Bowel sounds are normal. There is no distension or abdominal bruit.      " Palpations: Abdomen is soft.      Tenderness: There is no abdominal tenderness.   Musculoskeletal:      Right lower leg: No edema.      Left lower leg: No edema.   Skin:     General: Skin is warm and dry.      Findings: Wound present.          Neurological:      General: No focal deficit present.      Mental Status: He is alert and oriented to person, place, and time.      GCS: GCS eye subscore is 4. GCS verbal subscore is 5. GCS motor subscore is 6.   Psychiatric:         Attention and Perception: Attention and perception normal.         Mood and Affect: Affect normal. Mood is not depressed.         Speech: Speech normal.         Behavior: Behavior normal. Behavior is cooperative.         Thought Content: Thought content normal.         Cognition and Memory: Cognition and memory normal.         Judgment: Judgment normal.     Fluids    Intake/Output Summary (Last 24 hours) at 11/11/2020 1702  Last data filed at 11/11/2020 1300  Gross per 24 hour   Intake 1360 ml   Output --   Net 1360 ml     Laboratory    Imaging  IR-MIDLINE CATHETER INSERTION WO GUIDANCE > AGE 3   Final Result                  Ultrasound-guided midline placement performed by qualified nursing staff    as above.          IR-US GUIDED PIV   Final Result    Ultrasound-guided PERIPHERAL IV INSERTION performed by    qualified nursing staff as above.      IR-MIDLINE CATHETER INSERTION WO GUIDANCE > AGE 3   Final Result                  Ultrasound-guided midline placement performed by qualified nursing staff    as above.          US-KOSTAS SINGLE LEVEL BILAT   Final Result      CT-HEAD W/O   Final Result      No acute intracranial abnormality      DX-TIBIA AND FIBULA LEFT   Final Result      1.  Healed distal metaphyseal fractures of the left fibula and tibia with tibial IM layla in place.      2.  No acute fracture or dislocation.      3.  No radiopaque soft tissue foreign body.      DX-FEMUR-2+ LEFT   Final Result      1.  No radiographic evidence of acute  traumatic injury left femur.      2.  Unchanged cortical thickening in the posterior aspect of the distal femoral diaphysis which may represent sequela of prior injury or infection.      3.  No soft tissue foreign body identified.      US-EXTREMITY VENOUS LOWER UNILAT LEFT   Final Result      DX-CHEST-PORTABLE (1 VIEW)   Final Result      No acute cardiopulmonary abnormality.         Assessment/Plan  Infection of wound due to methicillin resistant Staphylococcus aureus (MRSA)- (present on admission)  Assessment & Plan  -History of MRSA.  -Poor compliance with OP wound care.   -Continue wound care.  Wound VAC was discontinued on 10/30/2020.  -Excisional debridements with wound care NP, improving per those notes.    Encephalopathy- (present on admission)  Assessment & Plan  -Acute on chronic, likely due Wernicke's.  -Psychiatry: incapacitated to make medical decision or leave AMA   -Kinship and placement pending.  -Avoid narcotics and hypnotics.    -Frequent reorientation.  -Sleep hygiene.    Non-compliance  Assessment & Plan  -Likely also component of psychiatric disorder and ETOH abuse.  Improved now the inpatient.  -Psychiatric consulted and suggests patient is incapacitated to make medical decisions or leave AMA.  -Continue ongoing encouragement for compliance.    Psychiatric disorder  Assessment & Plan  -Unknown/unspecified.  -Psychiatry consulted and deemed him incapacitated to leave AMA or make medical decisions.  -Continue Risperdal and Depakote.  -stable    Essential hypertension- (present on admission)  Assessment & Plan  -Well-controlled on current regimen.  Continue amlodipine.    Flexion contracture of knee, 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)  Assessment & Plan  -Secondary to chronic wound in that area.  -PT/OT.  -Encourage mobility.    Emphysema/COPD (HCC)  Assessment & Plan  -Chronic and stable, without acute exacerbation.    Alcohol dependence (HCC)- (present on admission)  Assessment & Plan  -History of.  Abstinent since admission.    Protein malnutrition (HCC)  Assessment & Plan  -Moderate/severe.  -Body mass index is 21.26 kg/m². (Improving)  -Continue TID supplements.  -Encourage PO intake.    Tobacco dependence- (present on admission)  Assessment & Plan  -Abstinent since admission.     VTE prophylaxis: Ambulation     LOUISE Hull.

## 2020-11-13 PROCEDURE — 770001 HCHG ROOM/CARE - MED/SURG/GYN PRIV*

## 2020-11-13 PROCEDURE — 700101 HCHG RX REV CODE 250: Performed by: NURSE PRACTITIONER

## 2020-11-13 PROCEDURE — 700102 HCHG RX REV CODE 250 W/ 637 OVERRIDE(OP): Performed by: NURSE PRACTITIONER

## 2020-11-13 PROCEDURE — A9270 NON-COVERED ITEM OR SERVICE: HCPCS | Performed by: NURSE PRACTITIONER

## 2020-11-13 RX ADMIN — AMLODIPINE BESYLATE 5 MG: 5 TABLET ORAL at 05:31

## 2020-11-13 RX ADMIN — GABAPENTIN 200 MG: 100 CAPSULE ORAL at 13:22

## 2020-11-13 RX ADMIN — RISPERIDONE 0.5 MG: 0.5 TABLET ORAL at 05:31

## 2020-11-13 RX ADMIN — RISPERIDONE 1 MG: 1 TABLET ORAL at 17:26

## 2020-11-13 RX ADMIN — DIVALPROEX SODIUM 125 MG: 125 CAPSULE ORAL at 05:31

## 2020-11-13 RX ADMIN — LIDOCAINE 1 PATCH: 50 PATCH CUTANEOUS at 13:22

## 2020-11-13 RX ADMIN — DIVALPROEX SODIUM 125 MG: 125 CAPSULE ORAL at 13:22

## 2020-11-13 RX ADMIN — OXYCODONE HYDROCHLORIDE 10 MG: 10 TABLET ORAL at 02:53

## 2020-11-13 RX ADMIN — Medication 400 MG: at 17:27

## 2020-11-13 RX ADMIN — OXYCODONE HYDROCHLORIDE 10 MG: 10 TABLET ORAL at 17:27

## 2020-11-13 RX ADMIN — GABAPENTIN 200 MG: 100 CAPSULE ORAL at 17:27

## 2020-11-13 RX ADMIN — OXYCODONE HYDROCHLORIDE 10 MG: 10 TABLET ORAL at 09:43

## 2020-11-13 RX ADMIN — DIVALPROEX SODIUM 125 MG: 125 CAPSULE ORAL at 21:19

## 2020-11-13 RX ADMIN — Medication 400 MG: at 05:31

## 2020-11-13 RX ADMIN — GABAPENTIN 200 MG: 100 CAPSULE ORAL at 05:31

## 2020-11-13 ASSESSMENT — PAIN DESCRIPTION - PAIN TYPE: TYPE: CHRONIC PAIN

## 2020-11-13 NOTE — PROGRESS NOTES
Vascular accsess consulted with me that pts midline is suppose to come out today. Consulted with YOSEF Gonzalez about IV. States ok to pull midline and for pt to have no iv.

## 2020-11-13 NOTE — PROGRESS NOTES
Pt found alert. ANO 4. Pt states they are tired and want to sleep more. On Room air. Tele sitter at bedside.

## 2020-11-13 NOTE — PROGRESS NOTES
Talked to wound nurse about pts leg. Pts leg is oozing and smells infected. Wound nurse states she will see him tomorrow. Let wound nurse know if leg looks infected to let th doctor or nurse know to see if antibiotics are appropriate. Will pass on in change of shift. Pt is not on any antibiotics as of now.

## 2020-11-14 PROCEDURE — 700102 HCHG RX REV CODE 250 W/ 637 OVERRIDE(OP): Performed by: NURSE PRACTITIONER

## 2020-11-14 PROCEDURE — A9270 NON-COVERED ITEM OR SERVICE: HCPCS | Performed by: NURSE PRACTITIONER

## 2020-11-14 PROCEDURE — 770001 HCHG ROOM/CARE - MED/SURG/GYN PRIV*

## 2020-11-14 PROCEDURE — 99231 SBSQ HOSP IP/OBS SF/LOW 25: CPT | Performed by: NURSE PRACTITIONER

## 2020-11-14 PROCEDURE — 700101 HCHG RX REV CODE 250: Performed by: NURSE PRACTITIONER

## 2020-11-14 PROCEDURE — 97597 DBRDMT OPN WND 1ST 20 CM/<: CPT

## 2020-11-14 RX ADMIN — DIVALPROEX SODIUM 125 MG: 125 CAPSULE ORAL at 14:15

## 2020-11-14 RX ADMIN — Medication 400 MG: at 04:59

## 2020-11-14 RX ADMIN — RISPERIDONE 1 MG: 1 TABLET ORAL at 16:51

## 2020-11-14 RX ADMIN — RISPERIDONE 0.5 MG: 0.5 TABLET ORAL at 04:58

## 2020-11-14 RX ADMIN — GABAPENTIN 200 MG: 100 CAPSULE ORAL at 16:50

## 2020-11-14 RX ADMIN — OXYCODONE HYDROCHLORIDE 10 MG: 10 TABLET ORAL at 10:34

## 2020-11-14 RX ADMIN — LIDOCAINE 1 PATCH: 50 PATCH CUTANEOUS at 12:00

## 2020-11-14 RX ADMIN — DIVALPROEX SODIUM 125 MG: 125 CAPSULE ORAL at 23:55

## 2020-11-14 RX ADMIN — GABAPENTIN 200 MG: 100 CAPSULE ORAL at 12:34

## 2020-11-14 RX ADMIN — AMLODIPINE BESYLATE 5 MG: 5 TABLET ORAL at 04:59

## 2020-11-14 RX ADMIN — Medication 400 MG: at 16:50

## 2020-11-14 RX ADMIN — DIVALPROEX SODIUM 125 MG: 125 CAPSULE ORAL at 04:59

## 2020-11-14 RX ADMIN — OXYCODONE HYDROCHLORIDE 10 MG: 10 TABLET ORAL at 16:50

## 2020-11-14 RX ADMIN — GABAPENTIN 200 MG: 100 CAPSULE ORAL at 04:58

## 2020-11-14 ASSESSMENT — FIBROSIS 4 INDEX: FIB4 SCORE: 0.61

## 2020-11-14 ASSESSMENT — ENCOUNTER SYMPTOMS
CONSTIPATION: 0
COUGH: 0
NAUSEA: 0
HEADACHES: 0
DIZZINESS: 0
DIARRHEA: 0
FEVER: 0
SENSORY CHANGE: 0
DEPRESSION: 0
ABDOMINAL PAIN: 0
SPEECH CHANGE: 0
WEAKNESS: 0
PALPITATIONS: 0
INSOMNIA: 0
VOMITING: 0
NERVOUS/ANXIOUS: 0
CHILLS: 0
SHORTNESS OF BREATH: 0
FOCAL WEAKNESS: 0

## 2020-11-14 ASSESSMENT — PAIN DESCRIPTION - PAIN TYPE: TYPE: CHRONIC PAIN

## 2020-11-14 NOTE — PROGRESS NOTES
Hospital Medicine Twice Weekly Progress Note    Date of Service  11/4/2020    Chief Complaint  LLE wound    Hospital Course  Mr. Varela is a 65-year-old male who presented to the emergency department with a left lower extremity wound infection. He was hospitalized at our facility in April and evaluated by orthopedic surgery who recommended wound care. Wound cultures at that time grew MSSA and Streptococcus. He had been seen at  earlier that week and prescribed antibiotics, however he had not filled the prescription.  An ultrasound of that extremity was done and was negative for DVT.  He was treated for sepsis and completed an antibiotic course on 9/16/2020. He was also found to have head lice and underwent treatment for that.  A repeat wound culture was done on 9/28/2020 and grew Strep A and Proteus. Infectious disease was consulted and recommended a 5-day course of IV zosyn which was completed on 10/5/2020. On 10/12/2020 the wound care team noticed increased inflammation to the proximal part of the wound bed and switched from a vera flow wound VAC to a regular wound VAC.  ID was again consulted and on 10/14/2020 recommended to 7-day course of antibiotics with meropenem which was completed on 10/22/2020. Additionally, he was noted to have intermittent agitation so was started on risperdal, depakote, and gabapentin per psychiatric recommendations. He was deemed to be incapacitated to make medical decisions and is currently pending guardianship and placement, likely to a group home.     Interval Problem Update  Patient is lying in bed pleasant, cooperative and fully oriented.  He endorses 8/10 pain in his left lower extremity.  He denies any other problems.  Wound care RN Hills at bedside. She reports increased biofilm on wound. Healing at inferior aspect, however may be increasing in size toward superior aspect.  -Wound care RN to increase dressing changes from twice weekly to 3 times  "weekly.  -Addition of silver nitrate dressing to wound care regimen  -Agree with Hills: Consider reintroduction of systemic antibiotics should above strategies fail.  -Excisional debridements per wound care nurse practitioner  -Continue pain control  -Toenail trimming    Consultants/Specialty  LPS  Infectious disease  Psychiatry    Code Status  Full Code    Disposition  guardianship court date is 11/20/2020 at 1030., then will likely pursue group home placement.     Review of Systems  Review of Systems   Constitutional: Negative for chills, fever and malaise/fatigue.   Respiratory: Negative for cough and shortness of breath.    Cardiovascular: Positive for leg swelling. Negative for chest pain and palpitations.   Gastrointestinal: Negative for abdominal pain, constipation, diarrhea, nausea and vomiting.   Genitourinary: Negative for dysuria, frequency and urgency.   Musculoskeletal:        Lower extremity pain, currently \"tolerable\".   Neurological: Negative for dizziness, sensory change, speech change, focal weakness, weakness and headaches.   Psychiatric/Behavioral: Negative for depression and suicidal ideas. The patient is not nervous/anxious and does not have insomnia.       Physical Exam  Temp:  [36.4 °C (97.6 °F)-37.2 °C (99 °F)] 37.2 °C (99 °F)  Pulse:  [62-80] 67  Resp:  [16-17] 16  BP: (109-122)/(64-72) 122/72  SpO2:  [90 %-95 %] 91 %    Physical Exam  Vitals signs and nursing note reviewed.   Constitutional:       General: He is awake. He is not in acute distress.     Appearance: Normal appearance. He is well-developed. He is not ill-appearing.   HENT:      Head: Normocephalic and atraumatic.      Mouth/Throat:      Lips: Pink.      Mouth: Mucous membranes are moist.   Eyes:      Conjunctiva/sclera: Conjunctivae normal.      Pupils: Pupils are equal, round, and reactive to light.   Neck:      Musculoskeletal: Normal range of motion and neck supple.   Cardiovascular:      Rate and Rhythm: Normal rate and " regular rhythm.      Pulses: Normal pulses.      Heart sounds: Normal heart sounds.   Pulmonary:      Effort: Pulmonary effort is normal.      Breath sounds: Normal breath sounds.   Abdominal:      General: Bowel sounds are normal. There is no distension or abdominal bruit.      Palpations: Abdomen is soft.      Tenderness: There is no abdominal tenderness.   Musculoskeletal:      Right lower leg: No edema.      Left lower leg: No edema.   Skin:     General: Skin is warm and dry.      Findings: Wound present.          Neurological:      General: No focal deficit present.      Mental Status: He is alert and oriented to person, place, and time.      GCS: GCS eye subscore is 4. GCS verbal subscore is 5. GCS motor subscore is 6.   Psychiatric:         Attention and Perception: Attention and perception normal.         Mood and Affect: Affect normal. Mood is not depressed.         Speech: Speech normal.         Behavior: Behavior normal. Behavior is cooperative.         Thought Content: Thought content normal.         Cognition and Memory: Cognition and memory normal.         Judgment: Judgment normal.     All systems reviewed and exam is unchanged from prior exam.    Fluids    Intake/Output Summary (Last 24 hours) at 11/14/2020 1302  Last data filed at 11/13/2020 1320  Gross per 24 hour   Intake 500 ml   Output --   Net 500 ml     Laboratory    Imaging  IR-MIDLINE CATHETER INSERTION WO GUIDANCE > AGE 3   Final Result                  Ultrasound-guided midline placement performed by qualified nursing staff    as above.          IR-US GUIDED PIV   Final Result    Ultrasound-guided PERIPHERAL IV INSERTION performed by    qualified nursing staff as above.      IR-MIDLINE CATHETER INSERTION WO GUIDANCE > AGE 3   Final Result                  Ultrasound-guided midline placement performed by qualified nursing staff    as above.          US-KOSTAS SINGLE LEVEL BILAT   Final Result      CT-HEAD W/O   Final Result      No acute  intracranial abnormality      DX-TIBIA AND FIBULA LEFT   Final Result      1.  Healed distal metaphyseal fractures of the left fibula and tibia with tibial IM layla in place.      2.  No acute fracture or dislocation.      3.  No radiopaque soft tissue foreign body.      DX-FEMUR-2+ LEFT   Final Result      1.  No radiographic evidence of acute traumatic injury left femur.      2.  Unchanged cortical thickening in the posterior aspect of the distal femoral diaphysis which may represent sequela of prior injury or infection.      3.  No soft tissue foreign body identified.      US-EXTREMITY VENOUS LOWER UNILAT LEFT   Final Result      DX-CHEST-PORTABLE (1 VIEW)   Final Result      No acute cardiopulmonary abnormality.         Assessment/Plan  Infection of wound due to methicillin resistant Staphylococcus aureus (MRSA)- (present on admission)  Assessment & Plan  -History of MRSA.  -Poor compliance with OP wound care.   -Continue wound care.  Wound VAC was discontinued on 10/30/2020.  -Excisional debridements with wound care NP  -biofilm increasing on wound, odor now present. Wound care RN to increase frequency of wound care and addition of silver dressing.  -May need to restart antibiotics. Last course of IV Merrem completed Oct.    Encephalopathy- (present on admission)  Assessment & Plan  -Acute on chronic, likely due Wernicke's.  -Psychiatry: incapacitated to make medical decision or leave AMA   -Kinship and placement pending.  -Avoid narcotics and hypnotics.    -Frequent reorientation.  -Sleep hygiene.    Non-compliance  Assessment & Plan  -Likely also component of psychiatric disorder and ETOH abuse.  -Psychiatric consulted and suggests patient is incapacitated to make medical decisions or leave AMA.  -Continue ongoing encouragement for compliance.  -resolved    Psychiatric disorder  Assessment & Plan  -Unknown/unspecified.  -Psychiatry consulted and deemed him incapacitated to leave AMA or make medical  decisions.  -Continue Risperdal and Depakote.  -stable    Essential hypertension- (present on admission)  Assessment & Plan  -Well-controlled on current regimen.  Continue amlodipine.    Flexion contracture of knee, 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)  Assessment & Plan  -Secondary to chronic wound in that area.  -PT/OT.  -Encourage mobility.    Emphysema/COPD (HCC)  Assessment & Plan  -Chronic and stable, without acute exacerbation.    Alcohol dependence (HCC)- (present on admission)  Assessment & Plan  -History of. Abstinent since admission.    Protein malnutrition (HCC)  Assessment & Plan  -Moderate/severe.  -Body mass index is 21.26 kg/m². (Improving)  -Continue TID supplements.  -Encourage PO intake.    Tobacco dependence- (present on admission)  Assessment & Plan  -Abstinent since admission.     VTE prophylaxis: Ambulation     LOUISE Hull.

## 2020-11-14 NOTE — CARE PLAN
Problem: Safety  Goal: Will remain free from injury  Outcome: PROGRESSING AS EXPECTED   Bed locked and in lowest position. Call light and personal belongings within reach. Treaded socks in place. Tele-sitter at bed side. Hourly rounding on going.   Problem: Venous Thromboembolism (VTW)/Deep Vein Thrombosis (DVT) Prevention:  Goal: Patient will participate in Venous Thrombosis (VTE)/Deep Vein Thrombosis (DVT)Prevention Measures  Outcome: PROGRESSING AS EXPECTED   Patient is up self and ambulates frequently.

## 2020-11-14 NOTE — WOUND TEAM
[facility]  Inpatient Services  Wound and Skin Care Progress Note    Admission Date: 8/7/2020     Last order of IP CONSULT TO WOUND CARE was found on 9/1/2020 from Hospital Encounter on 8/7/2020     HPI, PMH, SH: Reviewed    Unit where seen by Wound Team: [id number]    WOUND CONSULT/FOLLOW UP RELATED TO:  Left leg follow-up    Self Report / Pain Level: pain with debridement, topical lidocaine used    OBJECTIVE:  2 layer wrap intact, Tele Sitter in place and on.     WOUND TYPE, LOCATION, CHARACTERISTICS (Pressure Injuries: location, stage, POA or date identified)     Wound 08/07/20 Full Thickness Wound Leg LLE posterior, extends medially and laterally (Active)   Wound Image    11/14/20 1204   Site Assessment Red;Yellow 11/14/20 1204   Periwound Assessment Scar tissue 11/14/20 1204   Margins Attached edges;Epibole (rolled edges) 11/14/20 1204   Closure Secondary intention 11/14/20 1204   Drainage Amount Large 11/14/20 1204   Drainage Description Serosanguineous 11/14/20 1204   Treatments Chemical Debridement;Cleansed;CSWD - Conservative Sharp Wound Debridement;Site care 11/14/20 1204   Wound Cleansing Approved Wound Cleanser 11/14/20 1204   Periwound Protectant Not Applicable 11/14/20 1204   Dressing Cleansing/Solutions Not Applicable 11/14/20 1204   Dressing Options Absorbent Abdominal Pad;Compression Wrap Two Layer;Hydrofiber Silver 11/14/20 1204   Dressing Changed Changed 11/14/20 1204   Dressing Status Clean;Dry;Intact 11/14/20 1204   Dressing Change/Treatment Frequency By Wound Team Only 11/14/20 1204   NEXT Dressing Change/Treatment Date 11/16/20 11/14/20 1204   NEXT Weekly Photo (Inpatient Only) 11/16/20 11/14/20 1204   Non-staged Wound Description Full thickness 11/14/20 1204   Wound Length (cm) 30 cm 11/05/20 1400   Wound Width (cm) 13.7 cm 11/05/20 1400   Wound Depth (cm) 0.2 cm 11/05/20 1400   Wound Surface Area (cm^2) 411 cm^2 11/05/20 1400   Wound Volume (cm^3) 82.2 cm^3 11/05/20 1400    Post-Procedure Length (cm) 30 cm 20 1400   Post-Procedure Width (cm) 14.1 cm 20 1400   Post-Procedure Depth (cm) 0.2 cm 20 1400   Post-Procedure Surface Area (cm^2) 423 cm^2 20 1400   Post-Procedure Volume (cm^3) 84.6 cm^3 20 1400   Wound Healing % -56 20 1400   Wound Bed Granulation (%) 50 % 20 1204   Wound Bed Epithelium (%) 0 % 20 1300   Wound Bed Slough (%) 50 % 20 1204   Wound Bed Granulation (%) - Post-Procedure 90 % 20 1204   Wound Bed Epithelium % - (Post-Procedure) 0 % 20 1300   Wound Bed Slough % - (Post-Procedure) 10 % 20 1204   Wound Bed Eschar % - (Post-Procedure) 0 % 20 1300   Tunneling (cm) 0 cm 20 1300   Undermining (cm) 0 cm 20 1400   Shape irregular 20 1204   Wound Odor None 20 1204   Pulses Left;2+;DP;PT 11/10/20 1400   Exposed Structures None 20 1204   WOUND NURSE ONLY - Time Spent with Patient (mins) 75 20 1204          VASCULAR    CESAR:   CESAR Results, Last 30 Days Us-cesar Single Level Bilat    Result Date: 2020  Narrative  Vascular Laboratory  Conclusions  1.  Normal bilateral CESAR's.  GRUPO GANN  Age:    64    Gender:     M  MRN:    [id number]  :    1955      BSA:  Exam Date:     2020 09:59  Room #:     Inpatient  Priority:     Routine  Ht (in):             Wt (lb):  Ordering Physician:        EDDA CANADA  Referring Physician:       [id number] Morrissey  Sonographer:               Deja Ellis RDMS                             T  Study Type:                Complete Bilateral  Technical Quality:         Adequate  Indications:     Ulceration of LE  CPT Codes:       26524  ICD Codes:       707.1  History:         Nonhealing wound of left lower extremity.  Limitations:                 RIGHT  Waveform            Systolic BPs (mmHg)                             117           Brachial  Triphasic                                 Common Femoral  Triphasic                  138           Posterior Tibial  Triphasic                  132           Dorsalis Pedis                                           Peroneal                             1.18          KOSTAS                                           TBI                       LEFT  Waveform        Systolic BPs (mmHg)                             114           Brachial  Triphasic                                Common Femoral  Triphasic                  127           Posterior Tibial  Triphasic                  122           Dorsalis Pedis                                           Peroneal                             1.09          KOSTAS                                           TBI  Findings  Right.  Doppler waveforms of the common femoral artery are of high amplitude and  triphasic.  Doppler waveforms at the ankle are brisk and triphasic.  Ankle-brachial index is normal.  Left.  Doppler waveforms of the common femoral artery are of high amplitude and  triphasic.  Doppler waveforms at the ankle are brisk and triphasic.  Ankle-brachial index is normal.  Unable to obtained popliteal waveforms due to wound bandages.  Additional testing was not performed in accordance with lower extremity  arterial evaluation protocol.  Clover Maya  (Electronically Signed)  Final Date:      02 September 2020                   11:14    Lab Values:    Lab Results   Component Value Date/Time    WBC 6.0 10/14/2020 11:43 AM    RBC 3.87 (L) 10/14/2020 11:43 AM    HEMOGLOBIN 11.0 (L) 10/14/2020 11:43 AM    HEMATOCRIT 33.9 (L) 10/14/2020 11:43 AM    CREACTPROT 1.07 (H) 08/12/2020 01:55 PM    SEDRATEWES 85 (H) 08/07/2020 01:20 PM    HBA1C 5.7 (H) 11/09/2018 06:30 PM        Culture Results show:  Recent Results (from the past 720 hour(s))   CULTURE WOUND W/ GRAM STAIN    Collection Time: 09/01/20  2:00 PM    Specimen: Left Leg; Wound   Result Value Ref Range    Significant Indicator POS (POS)      Source WND     Site LEFT LEG     Culture Result - (A)     Gram Stain Result       Moderate Gram positive cocci.  Moderate Gram positive rods.      Culture Result (A)      Beta Hemolytic Streptococcus group A  Moderate growth      Culture Result (A)      Methicillin Resistant Staphylococcus aureus  Moderate growth      Culture Result (A)      Diphtheroids  Moderate growth  Mixed morphologies.         INTERVENTIONS BY WOUND TEAM:    Dressings removed and wound and addison cleansed with wound cleanse. Sharp debrided with scalpel thick layer of biofilm <20 cm2 on thigh aspect and rinsed again with wound cleanser.  Applied silver nitrate to entire wound bed, covered with aquacel ag, ABD and 2 - 2 layer to include thigh wound.  Placed 2 nylon to cover thigh and leg   Interdisciplinary consultation: Patient, Bedside RN, Freedom ELLER discussed some modifications WT can make to try to improve posterior thigh wound.    EVALUATION / RATIONALE FOR TREATMENT:    11/14 Posterior thigh aspect of wound deteriorating, will increase frequency of treatment to keep biofilm down and hopefully avoid systemic abx.  Will include thigh in compression wrap.  Will consider culture if improvement is not seen in the next few treatments.    11/10: APRN unavailable at this time.  Will re-schedule excisional debridement to next week.   11/5: Plan for debridement of scarred edges on Tues by LPS. Tendon exposed to posterior aspect of wound, behind knee. Continue with current dressing care.  10/29: Excisional debridement by Asaf ELLER of scar tissue along the proximal edge of the posterior knee and lateral thigh.  Lateral aspect of thigh is flatten.  Medial aspect of knee has thick scar tissue that was excisionally debrided by Asaf ELLER.  Fully granulated throughout the wound bed.   10/23 wound bed mostly granular, superficial in depth, progress has been slow with the wound VAC so will trial collagen product to encourage new  tissue growth.    10/19: wound bed has improved in color and is fully granulated.  Addison-wound has improved, denudation has resolved. APRN continuing with serial weekly debridements as needed.   10/16: wound friable pt now on iv abx per ID.  Indurated edges addressed with drape and foam.  Addison wound likely has yeast infection - addressing with silver powder crusting  10/14: patient seen with Asaf ELLER, stopping veraflo instillation.  Starting silver powder and regular wound VAC to see if it will help with bioburden.  Possible colonization of bacteria.  ID involved.   10/12: Inflammation noted to the proximal part of the wound bed.  Will continue to monitor, possible vac break on Wednesday to help addison-skin inflammation.   10/7: Excisional debridement performed by Asaf ELLER. Will need to continue selective debridement with NPWT changes, and weekly excisional debridement with APRN to flatten out the scar tissue.  IV abx therapy ended 10/5.   10/5: Lateral wound still with some slough, both wounds smaller per measurements. Medial wound with all granular tissue.  9/30: Patient to start abx therapy for 7 days course per Dr. Ellis.  Started dakin's instillation to veraflo  9/28: malodorous today, culture taken.    9/25: Odor noted, though unclear if from wound or pt, consider shower before next Vac change. Consider regular vac next change, wound granular and superficial.   9/23: Patient still awaiting guardianship for placement.   9/18: wound granulating nicely and responding well to current treatment.    9/16: Wound bed with granular tissue, edges are thick but appear better than previous assessments. . NPWT VF to encourage closure by secondary intention and manage drainage while encouraging oxygenation and granulation to the wound bed. 2 layer compression to assist in decrease of swelling and encourage blood flow to wounds. Wound team to follow up and change 3x/week.      Goals: Steady decrease in  wound area and depth weekly.    NURSING PLAN OF CARE ORDERS (X):    Dressing changes: See Dressing Care orders: X  Skin care: See Skin Care orders:   Rectal tube care: See Rectal Tube Care orders:   Other orders:      WOUND TEAM PLAN OF CARE:   Dressing changes by wound team:     X, 2 layer compression wrap 2x/weekly due to drainage.   Follow up 3 times weekly:                NPWT change 3 times weekly:    Follow up 1-2 times weekly:      Follow up Bi-Monthly:                   Follow up as needed:       Other (explain):     Anticipated discharge plans:  LTACH:        SNF/Rehab: X - patient will need ongoing wound care for LLE upon discharge - 2x/weekly           Home Health Care:           Outpatient Wound Center:            Self Care:

## 2020-11-15 PROCEDURE — A9270 NON-COVERED ITEM OR SERVICE: HCPCS | Performed by: NURSE PRACTITIONER

## 2020-11-15 PROCEDURE — 700102 HCHG RX REV CODE 250 W/ 637 OVERRIDE(OP): Performed by: NURSE PRACTITIONER

## 2020-11-15 PROCEDURE — 770001 HCHG ROOM/CARE - MED/SURG/GYN PRIV*

## 2020-11-15 PROCEDURE — 700101 HCHG RX REV CODE 250: Performed by: NURSE PRACTITIONER

## 2020-11-15 RX ADMIN — DIVALPROEX SODIUM 125 MG: 125 CAPSULE ORAL at 13:52

## 2020-11-15 RX ADMIN — OXYCODONE HYDROCHLORIDE 10 MG: 10 TABLET ORAL at 16:39

## 2020-11-15 RX ADMIN — OXYCODONE HYDROCHLORIDE 10 MG: 10 TABLET ORAL at 23:24

## 2020-11-15 RX ADMIN — LIDOCAINE 1 PATCH: 50 PATCH CUTANEOUS at 11:37

## 2020-11-15 RX ADMIN — OXYCODONE HYDROCHLORIDE 10 MG: 10 TABLET ORAL at 11:37

## 2020-11-15 RX ADMIN — DIVALPROEX SODIUM 125 MG: 125 CAPSULE ORAL at 06:15

## 2020-11-15 RX ADMIN — Medication 400 MG: at 04:38

## 2020-11-15 RX ADMIN — AMLODIPINE BESYLATE 5 MG: 5 TABLET ORAL at 04:38

## 2020-11-15 RX ADMIN — RISPERIDONE 1 MG: 1 TABLET ORAL at 16:38

## 2020-11-15 RX ADMIN — GABAPENTIN 200 MG: 100 CAPSULE ORAL at 04:38

## 2020-11-15 RX ADMIN — Medication 400 MG: at 16:39

## 2020-11-15 RX ADMIN — RISPERIDONE 0.5 MG: 0.5 TABLET ORAL at 04:39

## 2020-11-15 RX ADMIN — OXYCODONE HYDROCHLORIDE 10 MG: 10 TABLET ORAL at 00:23

## 2020-11-15 RX ADMIN — GABAPENTIN 200 MG: 100 CAPSULE ORAL at 11:37

## 2020-11-15 RX ADMIN — DIVALPROEX SODIUM 125 MG: 125 CAPSULE ORAL at 20:48

## 2020-11-15 RX ADMIN — GABAPENTIN 200 MG: 100 CAPSULE ORAL at 16:39

## 2020-11-15 ASSESSMENT — PAIN DESCRIPTION - PAIN TYPE
TYPE: CHRONIC PAIN

## 2020-11-15 NOTE — CARE PLAN
Problem: Safety  Goal: Will remain free from falls  Outcome: PROGRESSING AS EXPECTED  Note: Telesitter in place.      Problem: Discharge Barriers/Planning  Goal: Patient's continuum of care needs will be met  Outcome: PROGRESSING SLOWER THAN EXPECTED  Note: Pending Guardianship.

## 2020-11-15 NOTE — CARE PLAN
Problem: Safety  Goal: Will remain free from injury  Outcome: PROGRESSING AS EXPECTED   Bed locked and in lowest position. Call light and personal belongings within reach. Treaded socks in place. Tele sitter at bed side. Hourly rounding on going.   Problem: Infection  Goal: Will remain free from infection  Outcome: PROGRESSING AS EXPECTED   Standard precautions in place.

## 2020-11-16 PROCEDURE — A9270 NON-COVERED ITEM OR SERVICE: HCPCS | Performed by: NURSE PRACTITIONER

## 2020-11-16 PROCEDURE — 700102 HCHG RX REV CODE 250 W/ 637 OVERRIDE(OP): Performed by: NURSE PRACTITIONER

## 2020-11-16 PROCEDURE — 700101 HCHG RX REV CODE 250: Performed by: NURSE PRACTITIONER

## 2020-11-16 PROCEDURE — 770001 HCHG ROOM/CARE - MED/SURG/GYN PRIV*

## 2020-11-16 RX ADMIN — OXYCODONE HYDROCHLORIDE 10 MG: 10 TABLET ORAL at 11:50

## 2020-11-16 RX ADMIN — LIDOCAINE 1 PATCH: 50 PATCH CUTANEOUS at 11:50

## 2020-11-16 RX ADMIN — DIVALPROEX SODIUM 125 MG: 125 CAPSULE ORAL at 20:24

## 2020-11-16 RX ADMIN — GABAPENTIN 200 MG: 100 CAPSULE ORAL at 11:50

## 2020-11-16 RX ADMIN — Medication 400 MG: at 18:02

## 2020-11-16 RX ADMIN — DIVALPROEX SODIUM 125 MG: 125 CAPSULE ORAL at 06:01

## 2020-11-16 RX ADMIN — OXYCODONE HYDROCHLORIDE 10 MG: 10 TABLET ORAL at 18:03

## 2020-11-16 RX ADMIN — Medication 400 MG: at 06:01

## 2020-11-16 RX ADMIN — RISPERIDONE 0.5 MG: 0.5 TABLET ORAL at 06:02

## 2020-11-16 RX ADMIN — DIVALPROEX SODIUM 125 MG: 125 CAPSULE ORAL at 15:11

## 2020-11-16 RX ADMIN — OXYCODONE HYDROCHLORIDE 10 MG: 10 TABLET ORAL at 06:01

## 2020-11-16 RX ADMIN — GABAPENTIN 200 MG: 100 CAPSULE ORAL at 06:01

## 2020-11-16 RX ADMIN — AMLODIPINE BESYLATE 5 MG: 5 TABLET ORAL at 06:01

## 2020-11-16 RX ADMIN — GABAPENTIN 200 MG: 100 CAPSULE ORAL at 18:02

## 2020-11-16 RX ADMIN — RISPERIDONE 1 MG: 1 TABLET ORAL at 18:03

## 2020-11-16 ASSESSMENT — PAIN DESCRIPTION - PAIN TYPE
TYPE: CHRONIC PAIN

## 2020-11-16 NOTE — PROGRESS NOTES
Received report from Beverly TOBAR and assumed pt care. Pt denies pain, SOB or chest pain. Pt has no needs at this time.

## 2020-11-17 LAB — COVID ORDER STATUS COVID19: NORMAL

## 2020-11-17 PROCEDURE — 770001 HCHG ROOM/CARE - MED/SURG/GYN PRIV*

## 2020-11-17 PROCEDURE — 11045 DBRDMT SUBQ TISS EACH ADDL: CPT | Performed by: NURSE PRACTITIONER

## 2020-11-17 PROCEDURE — 700102 HCHG RX REV CODE 250 W/ 637 OVERRIDE(OP): Performed by: NURSE PRACTITIONER

## 2020-11-17 PROCEDURE — 700101 HCHG RX REV CODE 250: Performed by: NURSE PRACTITIONER

## 2020-11-17 PROCEDURE — U0003 INFECTIOUS AGENT DETECTION BY NUCLEIC ACID (DNA OR RNA); SEVERE ACUTE RESPIRATORY SYNDROME CORONAVIRUS 2 (SARS-COV-2) (CORONAVIRUS DISEASE [COVID-19]), AMPLIFIED PROBE TECHNIQUE, MAKING USE OF HIGH THROUGHPUT TECHNOLOGIES AS DESCRIBED BY CMS-2020-01-R: HCPCS

## 2020-11-17 PROCEDURE — A9270 NON-COVERED ITEM OR SERVICE: HCPCS | Performed by: NURSE PRACTITIONER

## 2020-11-17 PROCEDURE — 99231 SBSQ HOSP IP/OBS SF/LOW 25: CPT | Mod: 25 | Performed by: NURSE PRACTITIONER

## 2020-11-17 PROCEDURE — C9803 HOPD COVID-19 SPEC COLLECT: HCPCS | Performed by: NURSE PRACTITIONER

## 2020-11-17 PROCEDURE — 11042 DBRDMT SUBQ TIS 1ST 20SQCM/<: CPT | Performed by: NURSE PRACTITIONER

## 2020-11-17 RX ORDER — LIDOCAINE HYDROCHLORIDE AND EPINEPHRINE BITARTRATE 20; .01 MG/ML; MG/ML
10 INJECTION, SOLUTION SUBCUTANEOUS ONCE
Status: COMPLETED | OUTPATIENT
Start: 2020-11-17 | End: 2020-11-17

## 2020-11-17 RX ADMIN — DIVALPROEX SODIUM 125 MG: 125 CAPSULE ORAL at 15:09

## 2020-11-17 RX ADMIN — GABAPENTIN 200 MG: 100 CAPSULE ORAL at 11:38

## 2020-11-17 RX ADMIN — RISPERIDONE 0.5 MG: 0.5 TABLET ORAL at 05:22

## 2020-11-17 RX ADMIN — AMLODIPINE BESYLATE 5 MG: 5 TABLET ORAL at 05:22

## 2020-11-17 RX ADMIN — OXYCODONE HYDROCHLORIDE 10 MG: 10 TABLET ORAL at 15:09

## 2020-11-17 RX ADMIN — OXYCODONE HYDROCHLORIDE 10 MG: 10 TABLET ORAL at 05:22

## 2020-11-17 RX ADMIN — LIDOCAINE 1 PATCH: 50 PATCH CUTANEOUS at 11:38

## 2020-11-17 RX ADMIN — RISPERIDONE 1 MG: 1 TABLET ORAL at 17:14

## 2020-11-17 RX ADMIN — GABAPENTIN 200 MG: 100 CAPSULE ORAL at 17:15

## 2020-11-17 RX ADMIN — Medication 400 MG: at 05:22

## 2020-11-17 RX ADMIN — LIDOCAINE HYDROCHLORIDE AND EPINEPHRINE 10 ML: 20; 10 INJECTION, SOLUTION INFILTRATION; PERINEURAL at 10:36

## 2020-11-17 RX ADMIN — GABAPENTIN 200 MG: 100 CAPSULE ORAL at 05:22

## 2020-11-17 RX ADMIN — OXYCODONE HYDROCHLORIDE 10 MG: 10 TABLET ORAL at 21:16

## 2020-11-17 RX ADMIN — Medication 400 MG: at 17:14

## 2020-11-17 RX ADMIN — DIVALPROEX SODIUM 125 MG: 125 CAPSULE ORAL at 05:22

## 2020-11-17 RX ADMIN — DIVALPROEX SODIUM 125 MG: 125 CAPSULE ORAL at 21:16

## 2020-11-17 ASSESSMENT — PAIN DESCRIPTION - PAIN TYPE
TYPE: CHRONIC PAIN

## 2020-11-17 NOTE — CARE PLAN
Problem: Pain Management  Goal: Pain level will decrease to patient's comfort goal  Outcome: PROGRESSING AS EXPECTED       Problem: Safety  Goal: Will remain free from injury  Outcome: PROGRESSING AS EXPECTED  Sitter at bedside

## 2020-11-17 NOTE — PROGRESS NOTES
"WOUND CARE PROVIDER PROGRESS NOTE        HPI: 65-year-old male homelessness, EtOH use, tobacco dependence, chronic left lower extremity wound admitted 8/7/2020 with left lower extremity wound infection.  Patient was recently hospitalized at [facility] in April 2020, evaluated by orthopedic surgery who recommended wound care.  Wound culture grew MSSA and strep.  Patient was recently seen at [facility] prior to this admission was given a prescription for antibiotics but did not fill this.  Patient reports that he has had this wound for 8 to 10 years.  He reports that he fell and went \"ass over tea kettle\".  He reports that he would clean the wound almost daily with soap and water at the homeless shelter.  Per chart review, patient reported he developed the ulcer in May 2018 when he fell while hiking.  Patient was seen at wound care clinic in August 2018.  Patient had a  assisting him while he was living at [facility] care housing.  Wound VAC /was ordered however  had concerns with patient's compliancy and/ access to medical care.  Skilled nursing facility was contacted to have patient be admitted, however he was not accepted due to Medicaid.    Not on any blood thinners.  Patient ambulatory in halls.  Allergies reviewed.  He denies any allergies.      Interval hx:   9/11/2020: pt calm cooperative. On zyvox. Seen during VAC and two layer compression wrap change. Pt tolerating VAC and wraps. Wound decreased in size.   9/18/2020: Patient denies any fevers, chills, nausea, vomiting.  He is tolerating the veraflo wound VAC and 2 layer compression wrap.  Wound is decreasing in size.  Increased granular tissue noted, wound edges have improved however he does have rolled edges to popliteal fossa area.  Patient calm and cooperative, watching sports.  9/30/2020: Denies fevers, chills, nausea, vomiting.  Tolerating wound VAC and 2 layer compression wrap.  Refused nail care.  Wound culture positive for strep A " and Proteus.  10/7/2020: completed 5 day course of zyvox. Denies fevers chills nausea vomiting.  Seen during veraflo VAC and 2 layer compression wrap change.    10/14/2020: Patient denies fevers, chills, nausea, vomiting.  Patient wound is malodorous complaining of increased pain to the wound.  Increased slough noted to wound bed despite veraflo wound VAC.  Reconsult ID.  10/16/2020: Patient seen during wound VAC change with Miranda wound care PT. patient denies fever, chills, nausea, vomiting.  Patient reports pain to wound and increase in pain with wound VAC change.  Patient has granular tissue throughout wound with mild amount of slough to posterior aspect of leg.  Malodorous with VAC removal.  Wound VAC nursing changed.  10/29/2020: Wound VAC was discontinued by wound team on 10/23/2020 due to slow progress and collagen was started.  Patient has completed 7-day antibiotic course of meropenem for multidrug resistant Proteus vulgaris.  Patient found to have lice and has been treated.  Nonfoul-smelling odor present with dressing removal.  11/5/2020: Seen with wound team during 2 layer compression dressing change and for excisional debridement.  No odor noted today.  Thick layer of biofilm noted.  Wound edges continue to improve but still remain to medial aspect of wound.  11/17/2020: Seen with wound team during dressing and 2 layer compression wrap change.  Patient with severely thick scar tissue to wound edges.  Excisional debridement with injectable lidocaine and epinephrine performed today.  Patient denies any fevers, chills, nausea, vomiting.      Results:    CBC with differential 10/14/2020: WBC 6, H&H 11/33.9  Wound culture: 9/28/2020: Positive for beta-hemolytic strep group A, Proteus.  Sensitivities pending    Wound cx: 9/1/2020 mrsa, diphtheroids, beta hemolytic strep group A    8/7/2020: X-ray tib-fib left:  1.  Healed distal metaphyseal fractures of the left fibula and tibia with tibial IM layla in  place.     2.  No acute fracture or dislocation.     3.  No radiopaque soft tissue foreign body.      Arterial studies:   9/2/2020  Right.    Doppler waveforms of the common femoral artery are of high amplitude and    triphasic.    Doppler waveforms at the ankle are brisk and triphasic.    Ankle-brachial index is normal.       Left.    Doppler waveforms of the common femoral artery are of high amplitude and    triphasic.    Doppler waveforms at the ankle are brisk and triphasic.    Ankle-brachial index is normal.    Unable to obtained popliteal waveforms due to wound bandages.        Exam:  Left lower leg full-thickness ulcer  Slight odor noted today with dressing removal  Thick biofilm/slough     Tenderness improved   1 large wound has now  into 2 wounds with skin bridge at popliteal fossa  Thick closed wound edges to lateral edge of medial wound and to medial edge of lateral wound    Palpable PT pulse to left foot +1 foot   +2 DP left foot  Difficulty palpating popliteal due to scar tissue  No erythema  Able to extend left leg to 160 degrees. Able to flex left leg around 80 degrees        Long overgrown toenails with onychomycosis to bilateral great toenails.      Predebridement measurement: 21 x 3.7 x 0.2 cm  Pre-debridement left leg lateral posterior view          Predebridement measurement: 19.4 x 4.7 x 0.2 cm  Pre-debridement left medial lower leg                        The procedure, rationale for doing so, and the possible risks- including infection, pain and bleeding- have been discussed with the patient.  The patient  verbalized understanding and is agreeable with proceeding.  Consent signed.         DESCRIPTION OF PROCEDURE:   Left leg wound excisional debridement of skin, subcutaneous tissue      PMH, medications and recent labs reviewed     ANESTHESIA:  12 ml 2% lidocaine with epinephrine     PROCEDURE: A formal timeout was performed to confirm patient's correct name, correct site, correct  procedure and correct laterality.  Skin prepped with Chlorahexidine.  Lidocaine injected subcutaneously into several sites around the wound margins.    -Curette, scalpel and forceps used to excise closed wound edges, scar tissue, fibrinous tissue, slough and hyper granular tissue from the wound bed. .  Total area debrided of both wounds, ~192 cm².  Debridement carried into the subcutaneous tissue layer.  -Bleeding controlled with manual pressure and one applicator silver nitrate.    Wound care completed by wound RN- refer to flowsheet and note   Patient tolerated debridement.      Post debridement measurements:  21.9 x 4.5 x 0.5 cm  Post debridement left lower leg lateral posterior post debride                 Left leg  medial post debride  Measurements: 19.9 x 4.7 x 0.4 cm                                ASSESSMENT/PLAN:  65-year-old male with homelessness, EtOH use, tobacco use, and chronic left leg ulcer.  Closed wound edges overall are improved, but remain severely closed to medial side of lateral wound and lateral side of medial wound. Excisional debridement performed today.  Medically necessary to promote wound healing.   1 large wound has now  into 2 wounds with skin bridge to popliteal fossa.  However there is a severe amount of scar tissue and fibrinous tissue to the wound that is impeding wound healing that can no longer be addressed at bedside.  Discussed with Ortho surgeon who has agreed to take patient to surgery.        PLAN  -Arrange for I&D with [facility] and wound VAC for 11/19/2020 with Dr. Olvera  -N.p.o. at 2359 on 11/18/2020  -Covid screening  -Vanessa, Aquacel Ag, 2 layer compression wrap extended to thigh.  Increase frequency of dressing change to 3 times a week.  This will better control drainage and odor.  -No signs and symptoms of infection present today except for slight odor but this is related to heavy drainage.  Completed antibiotic course on 10/22/2020.     -Patient agreeable to  having toenails trimmed.  Will return to trim and file patient's toenails  - Patient to continue to be seen by wound care team while admitted  Patient to continue to be followed by wound care nurse practitioner as he requires serial excisional debridements        Discharge plan:  Pending guardianship      D/W: Patient, RN, Sera Simons wound care RN, Dr. Olvera, Cassia ELLER hospitalist, Clary PITTMAN

## 2020-11-17 NOTE — WOUND TEAM
[facility]  Inpatient Services  Wound and Skin Care Progress Note    Admission Date: 8/7/2020     Last order of IP CONSULT TO WOUND CARE was found on 9/1/2020 from Hospital Encounter on 8/7/2020     HPI, PMH, SH: Reviewed    Unit where seen by Wound Team: [id number]    WOUND CONSULT/FOLLOW UP RELATED TO:  Left leg follow-up    Self Report / Pain Level: pain with debridement, topical lidocaine used    OBJECTIVE:  2 layer wrap intact, Tele Sitter in place and on.     WOUND TYPE, LOCATION, CHARACTERISTICS (Pressure Injuries: location, stage, POA or date identified)     Wound 08/07/20 Full Thickness Wound Leg Lateral;Posterior Left (Active)   Wound Image    11/17/20 1200   Site Assessment Red;Slough;Fragile;Closed Wound Edges 11/17/20 1200   Periwound Assessment Clean;Dry;Intact;Scar tissue 11/17/20 1200   Margins Defined edges;Attached edges 11/17/20 1200   Closure Secondary intention 11/17/20 1200   Drainage Amount Large 11/17/20 1200   Drainage Description Serosanguineous;Purulent;Green 11/17/20 1200   Treatments Cleansed;CSWD - Conservative Sharp Wound Debridement;Compression;Irrigation;Site care 11/17/20 1200   Wound Cleansing Approved Wound Cleanser 11/17/20 1200   Periwound Protectant Moisture Barrier 11/17/20 1200   Dressing Cleansing/Solutions Not Applicable 11/17/20 1200   Dressing Options Collagen Dressing;Hydrofiber Silver;Absorbent Abdominal Pad;Compression Wrap Two Layer 11/17/20 1200   Dressing Changed Changed 11/17/20 1200   Dressing Status Clean;Dry;Intact 11/17/20 1200   Dressing Change/Treatment Frequency By Wound Team Only 11/17/20 1200   NEXT Dressing Change/Treatment Date 11/19/20 11/17/20 1200   NEXT Weekly Photo (Inpatient Only) 11/24/20 11/17/20 1200   Non-staged Wound Description Full thickness 11/17/20 1200   Wound Length (cm) 21 cm 11/17/20 1200   Wound Width (cm) 3.7 cm 11/17/20 1200   Wound Depth (cm) 0.2 cm 11/17/20 1200   Wound Surface Area (cm^2) 77.7 cm^2 11/17/20 1200   Wound  Volume (cm^3) 15.54 cm^3 11/17/20 1200   Post-Procedure Length (cm) 21.9 cm 11/17/20 1200   Post-Procedure Width (cm) 4.5 cm 11/17/20 1200   Post-Procedure Depth (cm) 0.5 cm 11/17/20 1200   Post-Procedure Surface Area (cm^2) 98.55 cm^2 11/17/20 1200   Post-Procedure Volume (cm^3) 49.28 cm^3 11/17/20 1200   Wound Healing % 70 11/17/20 1200   Wound Bed Granulation (%) 50 % 11/14/20 1204   Wound Bed Epithelium (%) 0 % 09/01/20 1300   Wound Bed Slough (%) 50 % 11/14/20 1204   Wound Bed Granulation (%) - Post-Procedure 90 % 11/14/20 1204   Wound Bed Epithelium % - (Post-Procedure) 0 % 09/01/20 1300   Wound Bed Slough % - (Post-Procedure) 10 % 11/14/20 1204   Wound Bed Eschar % - (Post-Procedure) 0 % 09/01/20 1300   Tunneling (cm) 0 cm 09/03/20 1300   Undermining (cm) 0 cm 11/05/20 1400   Shape broken into 2 part wounds with skin island 11/17/20 1200   Wound Odor Mild 11/17/20 1200   Pulses Left;2+;DP;PT 11/17/20 1200   Exposed Structures None 11/17/20 1200   WOUND NURSE ONLY - Time Spent with Patient (mins) 120 11/17/20 1200       Wound 11/17/20 Full Thickness Wound Leg Medial Left (Active)   Wound Image    11/17/20 1200   Site Assessment Red;Pink;Pale 11/17/20 1200   Periwound Assessment Clean;Dry;Intact;Scar tissue 11/17/20 1200   Margins Defined edges;Attached edges 11/17/20 1200   Closure Secondary intention 11/17/20 1200   Drainage Amount Moderate 11/17/20 1200   Drainage Description Serosanguineous 11/17/20 1200   Treatments Cleansed;Compression;CSWD - Conservative Sharp Wound Debridement;Irrigation;Site care 11/17/20 1200   Wound Cleansing Approved Wound Cleanser 11/17/20 1200   Periwound Protectant Moisture Barrier 11/17/20 1200   Dressing Cleansing/Solutions Not Applicable 11/17/20 1200   Dressing Options Collagen Dressing;Hydrofiber Silver;Absorbent Abdominal Pad;Compression Wrap Two Layer 11/17/20 1200   Dressing Changed Changed 11/17/20 1200   Dressing Status Clean;Dry;Intact 11/17/20 1200   Dressing  Change/Treatment Frequency By Wound Team Only 20 1200   NEXT Dressing Change/Treatment Date 20 1200   NEXT Weekly Photo (Inpatient Only) 20 1200   Non-staged Wound Description Full thickness 20 1200   Wound Length (cm) 19.4 cm 20 1200   Wound Width (cm) 4.7 cm 20 1200   Wound Depth (cm) 0.2 cm 20 1200   Wound Surface Area (cm^2) 91.18 cm^2 20 1200   Wound Volume (cm^3) 18.24 cm^3 20 1200   Post-Procedure Length (cm) 19.9 cm 20 1200   Post-Procedure Width (cm) 4.7 cm 20 1200   Post-Procedure Depth (cm) 0.4 cm 20 1200   Post-Procedure Surface Area (cm^2) 93.53 cm^2 20 1200   Post-Procedure Volume (cm^3) 37.41 cm^3 20 1200   Shape irregular 20 1200   Wound Odor Mild 20 1200   Pulses Left;2+;DP;PT 20 1200   Exposed Structures None 20 1200   WOUND NURSE ONLY - Time Spent with Patient (mins) 120 20 1200          VASCULAR    CESAR:   CESAR Results, Last 30 Days Us-cesar Single Level Bilat    Result Date: 2020  Narrative  Vascular Laboratory  Conclusions  1.  Normal bilateral CESAR's.  GRUPO GANN  Age:    64    Gender:     M  MRN:    [id number]  :    1955      BSA:  Exam Date:     2020 09:59  Room #:     Inpatient  Priority:     Routine  Ht (in):             Wt (lb):  Ordering Physician:        EDDA CANADA  Referring Physician:       [id number] Morrissey  Sonographer:               Deja Ellis RDMS                             RVT  Study Type:                Complete Bilateral  Technical Quality:         Adequate  Indications:     Ulceration of LE  CPT Codes:       57348  ICD Codes:       707.1  History:         Nonhealing wound of left lower extremity.  Limitations:                 RIGHT  Waveform            Systolic BPs (mmHg)                             117           Brachial  Triphasic                                 Common Femoral  Triphasic                  138           Posterior Tibial  Triphasic                  132           Dorsalis Pedis                                           Peroneal                             1.18          KOSTAS                                           TBI                       LEFT  Waveform        Systolic BPs (mmHg)                             114           Brachial  Triphasic                                Common Femoral  Triphasic                  127           Posterior Tibial  Triphasic                  122           Dorsalis Pedis                                           Peroneal                             1.09          KOSTAS                                           TBI  Findings  Right.  Doppler waveforms of the common femoral artery are of high amplitude and  triphasic.  Doppler waveforms at the ankle are brisk and triphasic.  Ankle-brachial index is normal.  Left.  Doppler waveforms of the common femoral artery are of high amplitude and  triphasic.  Doppler waveforms at the ankle are brisk and triphasic.  Ankle-brachial index is normal.  Unable to obtained popliteal waveforms due to wound bandages.  Additional testing was not performed in accordance with lower extremity  arterial evaluation protocol.  Clover Maya  (Electronically Signed)  Final Date:      02 September 2020                   11:14    Lab Values:    Lab Results   Component Value Date/Time    WBC 6.0 10/14/2020 11:43 AM    RBC 3.87 (L) 10/14/2020 11:43 AM    HEMOGLOBIN 11.0 (L) 10/14/2020 11:43 AM    HEMATOCRIT 33.9 (L) 10/14/2020 11:43 AM    CREACTPROT 1.07 (H) 08/12/2020 01:55 PM    SEDRATEWES 85 (H) 08/07/2020 01:20 PM    HBA1C 5.7 (H) 11/09/2018 06:30 PM        Culture Results show:  Recent Results (from the past 720 hour(s))   CULTURE WOUND W/ GRAM STAIN    Collection Time: 09/01/20  2:00 PM    Specimen: Left Leg; Wound   Result Value Ref Range    Significant Indicator POS (POS)     Source WND     Site LEFT  LEG     Culture Result - (A)     Gram Stain Result       Moderate Gram positive cocci.  Moderate Gram positive rods.      Culture Result (A)      Beta Hemolytic Streptococcus group A  Moderate growth      Culture Result (A)      Methicillin Resistant Staphylococcus aureus  Moderate growth      Culture Result (A)      Diphtheroids  Moderate growth  Mixed morphologies.         INTERVENTIONS BY WOUND TEAM:    Chart reviewed, patient seen with Asaf ELLER.  Dressings removed and wound and addison cleansed with wound cleanse. Pre-debridement measurements and photos taken. Excisional debridement performed by Asaf ELLER to remove scar tissue and non-viable tissue from wound beds.  Cleansed with wound cleanser, patted dry with gauze.  Covered with agata, aquacel ag, ABD and 2 - 2 layer to include thigh wound.  Placed 2 nylon to cover thigh and leg   Interdisciplinary consultation: Patient, Bedside RN, Asaf ELLER.    EVALUATION / RATIONALE FOR TREATMENT:    11/17: Excisional debridement by Asaf ELLER for thick scar tissue along the medial edge and posterior knee aspect of wound bed.  Thick hypergranulation tissue developed over top of tissue along the medial wound bed.  Skin island formed to separate the two wounds.   11/14 Posterior thigh aspect of wound deteriorating, will increase frequency of treatment to keep biofilm down and hopefully avoid systemic abx.  Will include thigh in compression wrap.  Will consider culture if improvement is not seen in the next few treatments.    11/10: APRN unavailable at this time.  Will re-schedule excisional debridement to next week.   11/5: Plan for debridement of scarred edges on Tues by LPS. Tendon exposed to posterior aspect of wound, behind knee. Continue with current dressing care.  10/29: Excisional debridement by Asaf ELLER of scar tissue along the proximal edge of the posterior knee and lateral thigh.  Lateral aspect of thigh is flatten.   Medial aspect of knee has thick scar tissue that was excisionally debrided by Asaf ELLER.  Fully granulated throughout the wound bed.   10/23 wound bed mostly granular, superficial in depth, progress has been slow with the wound VAC so will trial collagen product to encourage new tissue growth.    10/19: wound bed has improved in color and is fully granulated.  Addison-wound has improved, denudation has resolved. APRN continuing with serial weekly debridements as needed.   10/16: wound friable pt now on iv abx per ID.  Indurated edges addressed with drape and foam.  Addison wound likely has yeast infection - addressing with silver powder crusting  10/14: patient seen with Asaf ELLER, stopping veraflo instillation.  Starting silver powder and regular wound VAC to see if it will help with bioburden.  Possible colonization of bacteria.  ID involved.   10/12: Inflammation noted to the proximal part of the wound bed.  Will continue to monitor, possible vac break on Wednesday to help addison-skin inflammation.   10/7: Excisional debridement performed by Asaf ELLER. Will need to continue selective debridement with NPWT changes, and weekly excisional debridement with APRN to flatten out the scar tissue.  IV abx therapy ended 10/5.   10/5: Lateral wound still with some slough, both wounds smaller per measurements. Medial wound with all granular tissue.  9/30: Patient to start abx therapy for 7 days course per Dr. Ellis.  Started dakin's instillation to veraflo  9/28: malodorous today, culture taken.    9/25: Odor noted, though unclear if from wound or pt, consider shower before next Vac change. Consider regular vac next change, wound granular and superficial.   9/23: Patient still awaiting guardianship for placement.   9/18: wound granulating nicely and responding well to current treatment.    9/16: Wound bed with granular tissue, edges are thick but appear better than previous assessments. . NPWT VF to  encourage closure by secondary intention and manage drainage while encouraging oxygenation and granulation to the wound bed. 2 layer compression to assist in decrease of swelling and encourage blood flow to wounds. Wound team to follow up and change 3x/week.      Goals: Steady decrease in wound area and depth weekly.    NURSING PLAN OF CARE ORDERS (X):    Dressing changes: See Dressing Care orders: X  Skin care: See Skin Care orders:   Rectal tube care: See Rectal Tube Care orders:   Other orders:      WOUND TEAM PLAN OF CARE:   Dressing changes by wound team:     X, 2 layer compression wrap 3x/weekly due to drainage.   Follow up 3 times weekly:                NPWT change 3 times weekly:    Follow up 1-2 times weekly:      Follow up Bi-Monthly:                   Follow up as needed:       Other (explain):     Anticipated discharge plans:  LTACH:        SNF/Rehab: X - patient will need ongoing wound care for LLE upon discharge          Home Health Care:           Outpatient Wound Center:            Self Care:

## 2020-11-17 NOTE — DISCHARGE PLANNING
LSW assisted in facilitating a Zoom meeting via Voal[employer] phone between pt and his legal representation, Consuelo Benito from [facility] [employer].    Consuelo confirmed that the pt's court date is this Friday 11/20.

## 2020-11-18 PROBLEM — Z91.199 NON-COMPLIANCE: Status: RESOLVED | Noted: 2020-08-08 | Resolved: 2020-11-18

## 2020-11-18 LAB
SARS-COV-2 RNA RESP QL NAA+PROBE: NOTDETECTED
SPECIMEN SOURCE: NORMAL

## 2020-11-18 PROCEDURE — 700102 HCHG RX REV CODE 250 W/ 637 OVERRIDE(OP): Performed by: NURSE PRACTITIONER

## 2020-11-18 PROCEDURE — 700101 HCHG RX REV CODE 250: Performed by: NURSE PRACTITIONER

## 2020-11-18 PROCEDURE — A9270 NON-COVERED ITEM OR SERVICE: HCPCS | Performed by: NURSE PRACTITIONER

## 2020-11-18 PROCEDURE — 99231 SBSQ HOSP IP/OBS SF/LOW 25: CPT | Performed by: NURSE PRACTITIONER

## 2020-11-18 PROCEDURE — 770001 HCHG ROOM/CARE - MED/SURG/GYN PRIV*

## 2020-11-18 RX ADMIN — OXYCODONE HYDROCHLORIDE 10 MG: 10 TABLET ORAL at 12:53

## 2020-11-18 RX ADMIN — GABAPENTIN 200 MG: 100 CAPSULE ORAL at 12:53

## 2020-11-18 RX ADMIN — GABAPENTIN 200 MG: 100 CAPSULE ORAL at 06:04

## 2020-11-18 RX ADMIN — LIDOCAINE 1 PATCH: 50 PATCH CUTANEOUS at 12:53

## 2020-11-18 RX ADMIN — GABAPENTIN 200 MG: 100 CAPSULE ORAL at 17:03

## 2020-11-18 RX ADMIN — OXYCODONE HYDROCHLORIDE 10 MG: 10 TABLET ORAL at 19:24

## 2020-11-18 RX ADMIN — RISPERIDONE 1 MG: 1 TABLET ORAL at 17:03

## 2020-11-18 RX ADMIN — Medication 400 MG: at 17:03

## 2020-11-18 RX ADMIN — RISPERIDONE 0.5 MG: 0.5 TABLET ORAL at 06:03

## 2020-11-18 RX ADMIN — DIVALPROEX SODIUM 125 MG: 125 CAPSULE ORAL at 21:22

## 2020-11-18 RX ADMIN — AMLODIPINE BESYLATE 5 MG: 5 TABLET ORAL at 06:04

## 2020-11-18 RX ADMIN — OXYCODONE HYDROCHLORIDE 10 MG: 10 TABLET ORAL at 06:04

## 2020-11-18 RX ADMIN — Medication 400 MG: at 06:03

## 2020-11-18 RX ADMIN — DIVALPROEX SODIUM 125 MG: 125 CAPSULE ORAL at 12:53

## 2020-11-18 RX ADMIN — DIVALPROEX SODIUM 125 MG: 125 CAPSULE ORAL at 06:04

## 2020-11-18 ASSESSMENT — ENCOUNTER SYMPTOMS
SHORTNESS OF BREATH: 0
WHEEZING: 0
FEVER: 0
ABDOMINAL PAIN: 0
CONSTIPATION: 0
VOMITING: 0
NERVOUS/ANXIOUS: 0
HEADACHES: 0
SENSORY CHANGE: 0
INSOMNIA: 0
COUGH: 0
SPEECH CHANGE: 0
DIARRHEA: 0
FOCAL WEAKNESS: 0
DIZZINESS: 0
DEPRESSION: 0
WEAKNESS: 0
NAUSEA: 0

## 2020-11-18 ASSESSMENT — PAIN DESCRIPTION - PAIN TYPE
TYPE: CHRONIC PAIN
TYPE: CHRONIC PAIN

## 2020-11-18 NOTE — PROGRESS NOTES
Wound Care Provider Note:      Pt seen for foot/nail care.  Toenails noted to be overgrown and mycotic.  Toes wrapped with soapy, wet washcloths and covered with plastic bags for ~20 minutes prior to trimming and filing of nails.  Mild ingrown toenail noted to bilateral great toe. alcohol swab added to each corner of nail to allow for nail to grow outward.  Trimming and filing completed without difficulty, no knicks or cuts. Moisturizer applied to both feet prior to replacing slipper-socks.

## 2020-11-19 ENCOUNTER — APPOINTMENT (OUTPATIENT)
Dept: RADIOLOGY | Facility: MEDICAL CENTER | Age: 65
DRG: 853 | End: 2020-11-19
Attending: NURSE PRACTITIONER
Payer: MEDICARE

## 2020-11-19 ENCOUNTER — ANESTHESIA EVENT (OUTPATIENT)
Dept: SURGERY | Facility: MEDICAL CENTER | Age: 65
DRG: 853 | End: 2020-11-19
Payer: MEDICARE

## 2020-11-19 ENCOUNTER — ANESTHESIA (OUTPATIENT)
Dept: SURGERY | Facility: MEDICAL CENTER | Age: 65
DRG: 853 | End: 2020-11-19
Payer: MEDICARE

## 2020-11-19 LAB
ANION GAP SERPL CALC-SCNC: 9 MMOL/L (ref 7–16)
BASOPHILS # BLD AUTO: 0.4 % (ref 0–1.8)
BASOPHILS # BLD: 0.03 K/UL (ref 0–0.12)
BUN SERPL-MCNC: 16 MG/DL (ref 8–22)
CALCIUM SERPL-MCNC: 9.2 MG/DL (ref 8.5–10.5)
CHLORIDE SERPL-SCNC: 98 MMOL/L (ref 96–112)
CO2 SERPL-SCNC: 26 MMOL/L (ref 20–33)
CREAT SERPL-MCNC: 0.74 MG/DL (ref 0.5–1.4)
EOSINOPHIL # BLD AUTO: 0.14 K/UL (ref 0–0.51)
EOSINOPHIL NFR BLD: 1.8 % (ref 0–6.9)
ERYTHROCYTE [DISTWIDTH] IN BLOOD BY AUTOMATED COUNT: 45.6 FL (ref 35.9–50)
GLUCOSE SERPL-MCNC: 96 MG/DL (ref 65–99)
HCT VFR BLD AUTO: 32.3 % (ref 42–52)
HGB BLD-MCNC: 10.4 G/DL (ref 14–18)
IMM GRANULOCYTES # BLD AUTO: 0.02 K/UL (ref 0–0.11)
IMM GRANULOCYTES NFR BLD AUTO: 0.3 % (ref 0–0.9)
LYMPHOCYTES # BLD AUTO: 1.2 K/UL (ref 1–4.8)
LYMPHOCYTES NFR BLD: 15.3 % (ref 22–41)
MCH RBC QN AUTO: 27.2 PG (ref 27–33)
MCHC RBC AUTO-ENTMCNC: 32.2 G/DL (ref 33.7–35.3)
MCV RBC AUTO: 84.3 FL (ref 81.4–97.8)
MONOCYTES # BLD AUTO: 1.41 K/UL (ref 0–0.85)
MONOCYTES NFR BLD AUTO: 18 % (ref 0–13.4)
NEUTROPHILS # BLD AUTO: 5.02 K/UL (ref 1.82–7.42)
NEUTROPHILS NFR BLD: 64.2 % (ref 44–72)
NRBC # BLD AUTO: 0 K/UL
NRBC BLD-RTO: 0 /100 WBC
PLATELET # BLD AUTO: 379 K/UL (ref 164–446)
PMV BLD AUTO: 9.6 FL (ref 9–12.9)
POTASSIUM SERPL-SCNC: 3.8 MMOL/L (ref 3.6–5.5)
RBC # BLD AUTO: 3.83 M/UL (ref 4.7–6.1)
SODIUM SERPL-SCNC: 133 MMOL/L (ref 135–145)
WBC # BLD AUTO: 7.8 K/UL (ref 4.8–10.8)

## 2020-11-19 PROCEDURE — 80048 BASIC METABOLIC PNL TOTAL CA: CPT

## 2020-11-19 PROCEDURE — A9270 NON-COVERED ITEM OR SERVICE: HCPCS | Performed by: NURSE PRACTITIONER

## 2020-11-19 PROCEDURE — 700102 HCHG RX REV CODE 250 W/ 637 OVERRIDE(OP): Performed by: NURSE PRACTITIONER

## 2020-11-19 PROCEDURE — 770001 HCHG ROOM/CARE - MED/SURG/GYN PRIV*

## 2020-11-19 PROCEDURE — 160002 HCHG RECOVERY MINUTES (STAT): Performed by: ORTHOPAEDIC SURGERY

## 2020-11-19 PROCEDURE — 99233 SBSQ HOSP IP/OBS HIGH 50: CPT | Performed by: HOSPITALIST

## 2020-11-19 PROCEDURE — 700101 HCHG RX REV CODE 250: Performed by: ANESTHESIOLOGY

## 2020-11-19 PROCEDURE — 0JDP0ZZ EXTRACTION OF LEFT LOWER LEG SUBCUTANEOUS TISSUE AND FASCIA, OPEN APPROACH: ICD-10-PCS | Performed by: ORTHOPAEDIC SURGERY

## 2020-11-19 PROCEDURE — 160048 HCHG OR STATISTICAL LEVEL 1-5: Performed by: ORTHOPAEDIC SURGERY

## 2020-11-19 PROCEDURE — 160035 HCHG PACU - 1ST 60 MINS PHASE I: Performed by: ORTHOPAEDIC SURGERY

## 2020-11-19 PROCEDURE — 700102 HCHG RX REV CODE 250 W/ 637 OVERRIDE(OP): Performed by: ANESTHESIOLOGY

## 2020-11-19 PROCEDURE — A9270 NON-COVERED ITEM OR SERVICE: HCPCS | Performed by: ANESTHESIOLOGY

## 2020-11-19 PROCEDURE — 700111 HCHG RX REV CODE 636 W/ 250 OVERRIDE (IP): Performed by: ANESTHESIOLOGY

## 2020-11-19 PROCEDURE — 500881 HCHG PACK, EXTREMITY: Performed by: ORTHOPAEDIC SURGERY

## 2020-11-19 PROCEDURE — 85025 COMPLETE CBC W/AUTO DIFF WBC: CPT

## 2020-11-19 PROCEDURE — 36415 COLL VENOUS BLD VENIPUNCTURE: CPT

## 2020-11-19 PROCEDURE — 0JUP0KZ SUPPLEMENT OF LEFT LOWER LEG SUBCUTANEOUS TISSUE AND FASCIA WITH NONAUTOLOGOUS TISSUE SUBSTITUTE, OPEN APPROACH: ICD-10-PCS | Performed by: ORTHOPAEDIC SURGERY

## 2020-11-19 PROCEDURE — 160036 HCHG PACU - EA ADDL 30 MINS PHASE I: Performed by: ORTHOPAEDIC SURGERY

## 2020-11-19 PROCEDURE — A6223 GAUZE >16<=48 NO W/SAL W/O B: HCPCS | Performed by: ORTHOPAEDIC SURGERY

## 2020-11-19 PROCEDURE — 160009 HCHG ANES TIME/MIN: Performed by: ORTHOPAEDIC SURGERY

## 2020-11-19 PROCEDURE — A6454 SELF-ADHER BAND W>=3" <5"/YD: HCPCS | Performed by: ORTHOPAEDIC SURGERY

## 2020-11-19 PROCEDURE — 160027 HCHG SURGERY MINUTES - 1ST 30 MINS LEVEL 2: Performed by: ORTHOPAEDIC SURGERY

## 2020-11-19 PROCEDURE — 501838 HCHG SUTURE GENERAL: Performed by: ORTHOPAEDIC SURGERY

## 2020-11-19 PROCEDURE — 160038 HCHG SURGERY MINUTES - EA ADDL 1 MIN LEVEL 2: Performed by: ORTHOPAEDIC SURGERY

## 2020-11-19 PROCEDURE — 501330 HCHG SET, CYSTO IRRIG TUBING: Performed by: ORTHOPAEDIC SURGERY

## 2020-11-19 DEVICE — IMPLANTABLE DEVICE: Type: IMPLANTABLE DEVICE | Site: LEG | Status: FUNCTIONAL

## 2020-11-19 RX ORDER — ACETAMINOPHEN 500 MG
1000 TABLET ORAL ONCE
Status: CANCELLED | OUTPATIENT
Start: 2020-11-19 | End: 2020-11-19

## 2020-11-19 RX ORDER — CELECOXIB 200 MG/1
200 CAPSULE ORAL ONCE
Status: CANCELLED | OUTPATIENT
Start: 2020-11-19 | End: 2020-11-19

## 2020-11-19 RX ORDER — HALOPERIDOL 5 MG/ML
1 INJECTION INTRAMUSCULAR
Status: DISCONTINUED | OUTPATIENT
Start: 2020-11-19 | End: 2020-11-19 | Stop reason: HOSPADM

## 2020-11-19 RX ORDER — ONDANSETRON 2 MG/ML
INJECTION INTRAMUSCULAR; INTRAVENOUS PRN
Status: DISCONTINUED | OUTPATIENT
Start: 2020-11-19 | End: 2020-11-20 | Stop reason: SURG

## 2020-11-19 RX ORDER — OXYCODONE HCL 5 MG/5 ML
10 SOLUTION, ORAL ORAL
Status: COMPLETED | OUTPATIENT
Start: 2020-11-19 | End: 2020-11-19

## 2020-11-19 RX ORDER — LABETALOL HYDROCHLORIDE 5 MG/ML
5 INJECTION, SOLUTION INTRAVENOUS
Status: DISCONTINUED | OUTPATIENT
Start: 2020-11-19 | End: 2020-11-19 | Stop reason: HOSPADM

## 2020-11-19 RX ORDER — MEPERIDINE HYDROCHLORIDE 50 MG/ML
12.5 INJECTION INTRAMUSCULAR; INTRAVENOUS; SUBCUTANEOUS
Status: DISCONTINUED | OUTPATIENT
Start: 2020-11-19 | End: 2020-11-19 | Stop reason: HOSPADM

## 2020-11-19 RX ORDER — HYDROMORPHONE HYDROCHLORIDE 1 MG/ML
0.4 INJECTION, SOLUTION INTRAMUSCULAR; INTRAVENOUS; SUBCUTANEOUS
Status: DISCONTINUED | OUTPATIENT
Start: 2020-11-19 | End: 2020-11-19 | Stop reason: HOSPADM

## 2020-11-19 RX ORDER — HYDROMORPHONE HYDROCHLORIDE 1 MG/ML
0.1 INJECTION, SOLUTION INTRAMUSCULAR; INTRAVENOUS; SUBCUTANEOUS
Status: DISCONTINUED | OUTPATIENT
Start: 2020-11-19 | End: 2020-11-19 | Stop reason: HOSPADM

## 2020-11-19 RX ORDER — OXYCODONE HCL 5 MG/5 ML
5 SOLUTION, ORAL ORAL
Status: COMPLETED | OUTPATIENT
Start: 2020-11-19 | End: 2020-11-19

## 2020-11-19 RX ORDER — DEXAMETHASONE SODIUM PHOSPHATE 4 MG/ML
INJECTION, SOLUTION INTRA-ARTICULAR; INTRALESIONAL; INTRAMUSCULAR; INTRAVENOUS; SOFT TISSUE PRN
Status: DISCONTINUED | OUTPATIENT
Start: 2020-11-19 | End: 2020-11-20 | Stop reason: SURG

## 2020-11-19 RX ORDER — CEFAZOLIN SODIUM 1 G/3ML
INJECTION, POWDER, FOR SOLUTION INTRAMUSCULAR; INTRAVENOUS PRN
Status: DISCONTINUED | OUTPATIENT
Start: 2020-11-19 | End: 2020-11-20 | Stop reason: SURG

## 2020-11-19 RX ORDER — ONDANSETRON 2 MG/ML
4 INJECTION INTRAMUSCULAR; INTRAVENOUS
Status: DISCONTINUED | OUTPATIENT
Start: 2020-11-19 | End: 2020-11-19 | Stop reason: HOSPADM

## 2020-11-19 RX ORDER — HYDROMORPHONE HYDROCHLORIDE 1 MG/ML
0.2 INJECTION, SOLUTION INTRAMUSCULAR; INTRAVENOUS; SUBCUTANEOUS
Status: DISCONTINUED | OUTPATIENT
Start: 2020-11-19 | End: 2020-11-19 | Stop reason: HOSPADM

## 2020-11-19 RX ORDER — DIPHENHYDRAMINE HYDROCHLORIDE 50 MG/ML
12.5 INJECTION INTRAMUSCULAR; INTRAVENOUS
Status: DISCONTINUED | OUTPATIENT
Start: 2020-11-19 | End: 2020-11-19 | Stop reason: HOSPADM

## 2020-11-19 RX ADMIN — GABAPENTIN 200 MG: 100 CAPSULE ORAL at 13:13

## 2020-11-19 RX ADMIN — RISPERIDONE 0.5 MG: 0.5 TABLET ORAL at 06:11

## 2020-11-19 RX ADMIN — DEXAMETHASONE SODIUM PHOSPHATE 8 MG: 4 INJECTION, SOLUTION INTRA-ARTICULAR; INTRALESIONAL; INTRAMUSCULAR; INTRAVENOUS; SOFT TISSUE at 15:49

## 2020-11-19 RX ADMIN — DIVALPROEX SODIUM 125 MG: 125 CAPSULE ORAL at 06:08

## 2020-11-19 RX ADMIN — GABAPENTIN 200 MG: 100 CAPSULE ORAL at 06:08

## 2020-11-19 RX ADMIN — RISPERIDONE 1 MG: 1 TABLET ORAL at 18:22

## 2020-11-19 RX ADMIN — FENTANYL CITRATE 25 MCG: 50 INJECTION, SOLUTION INTRAMUSCULAR; INTRAVENOUS at 17:06

## 2020-11-19 RX ADMIN — OXYCODONE HYDROCHLORIDE 10 MG: 10 TABLET ORAL at 20:22

## 2020-11-19 RX ADMIN — OXYCODONE HYDROCHLORIDE 10 MG: 10 TABLET ORAL at 06:08

## 2020-11-19 RX ADMIN — GABAPENTIN 200 MG: 100 CAPSULE ORAL at 18:22

## 2020-11-19 RX ADMIN — FENTANYL CITRATE 25 MCG: 50 INJECTION, SOLUTION INTRAMUSCULAR; INTRAVENOUS at 17:00

## 2020-11-19 RX ADMIN — DIVALPROEX SODIUM 125 MG: 125 CAPSULE ORAL at 20:22

## 2020-11-19 RX ADMIN — ROCURONIUM BROMIDE 50 MG: 10 INJECTION, SOLUTION INTRAVENOUS at 15:58

## 2020-11-19 RX ADMIN — Medication 400 MG: at 18:22

## 2020-11-19 RX ADMIN — ONDANSETRON 4 MG: 2 INJECTION INTRAMUSCULAR; INTRAVENOUS at 15:49

## 2020-11-19 RX ADMIN — OXYCODONE HYDROCHLORIDE 10 MG: 10 TABLET ORAL at 13:13

## 2020-11-19 RX ADMIN — PROPOFOL 100 MG: 10 INJECTION, EMULSION INTRAVENOUS at 15:55

## 2020-11-19 RX ADMIN — AMLODIPINE BESYLATE 5 MG: 5 TABLET ORAL at 06:10

## 2020-11-19 RX ADMIN — CYCLOBENZAPRINE 10 MG: 10 TABLET, FILM COATED ORAL at 20:22

## 2020-11-19 RX ADMIN — Medication 400 MG: at 06:08

## 2020-11-19 RX ADMIN — DIVALPROEX SODIUM 125 MG: 125 CAPSULE ORAL at 13:13

## 2020-11-19 RX ADMIN — OXYCODONE HYDROCHLORIDE 5 MG: 5 SOLUTION ORAL at 16:59

## 2020-11-19 RX ADMIN — CEFAZOLIN 2 G: 330 INJECTION, POWDER, FOR SOLUTION INTRAMUSCULAR; INTRAVENOUS at 15:49

## 2020-11-19 ASSESSMENT — PAIN DESCRIPTION - PAIN TYPE
TYPE: SURGICAL PAIN

## 2020-11-19 NOTE — CONSULTS
11/19/2020    Bola Varela is a 65 y.o. male who presents with nonhealing wounds left posterior leg.  Has been getting debriements and dressing changes on the floor, now indicated for operative debridement, biologic and vac placement. Does not recall exactly how wounds started.    Past Medical History:   Diagnosis Date   • Psychiatric problem    • Restless leg    • Tobacco use        Past Surgical History:   Procedure Laterality Date   • ANKLE ORIF Right        Medications  No current facility-administered medications on file prior to encounter.      No current outpatient medications on file prior to encounter.       Allergies  Patient has no known allergies.    ROS  All other systems were reviewed and found to be negative    Family History   Problem Relation Age of Onset   • Heart Disease Mother    • No Known Problems Father    • Cancer Neg Hx    • Diabetes Neg Hx    • Stroke Neg Hx        Social History     Socioeconomic History   • Marital status: Single     Spouse name: Not on file   • Number of children: Not on file   • Years of education: Not on file   • Highest education level: Not on file   Occupational History   • Not on file   Social Needs   • Financial resource strain: Not on file   • Food insecurity     Worry: Never true     Inability: Never true   • Transportation needs     Medical: No     Non-medical: No   Tobacco Use   • Smoking status: Light Tobacco Smoker     Packs/day: 0.00     Years: 40.00     Pack years: 0.00     Types: Cigarettes   • Smokeless tobacco: Never Used   • Tobacco comment: 1 ppd until few years ago   Substance and Sexual Activity   • Alcohol use: Yes     Frequency: 2-4 times a month     Drinks per session: 3 or 4     Binge frequency: Never     Comment: occasional    • Drug use: No     Types: Marijuana   • Sexual activity: Not Currently     Partners: Female   Lifestyle   • Physical activity     Days per week: Not on file     Minutes per session: Not on file   • Stress: Not on  "file   Relationships   • Social connections     Talks on phone: Not on file     Gets together: Not on file     Attends Spiritism service: Not on file     Active member of club or organization: Not on file     Attends meetings of clubs or organizations: Not on file     Relationship status: Not on file   • Intimate partner violence     Fear of current or ex partner: Not on file     Emotionally abused: Not on file     Physically abused: Not on file     Forced sexual activity: Not on file   Other Topics Concern   • Not on file   Social History Narrative   • Not on file       Physical Exam  Vitals  /67   Pulse 74   Temp 37 °C (98.6 °F) (Temporal)   Resp 15   Ht 1.803 m (5' 10.98\")   Wt 69.6 kg (153 lb 7 oz)   SpO2 94%   General: Well Developed, Well Nourished, no acute distress  HEENT: Normocephalic, atraumatic  Eyes: Anicteric, PERRLA, EOMI  LLE: Dec sens stocking dist, toes WWP. Large posterior wounds knee and calf.        Radiographs:  IR-US GUIDED PIV   Final Result    Ultrasound-guided PERIPHERAL IV INSERTION performed by    qualified nursing staff as above.      IR-MIDLINE CATHETER INSERTION WO GUIDANCE > AGE 3   Final Result                  Ultrasound-guided midline placement performed by qualified nursing staff    as above.          IR-US GUIDED PIV   Final Result    Ultrasound-guided PERIPHERAL IV INSERTION performed by    qualified nursing staff as above.      IR-MIDLINE CATHETER INSERTION WO GUIDANCE > AGE 3   Final Result                  Ultrasound-guided midline placement performed by qualified nursing staff    as above.          US-KOSTAS SINGLE LEVEL BILAT   Final Result      CT-HEAD W/O   Final Result      No acute intracranial abnormality      DX-TIBIA AND FIBULA LEFT   Final Result      1.  Healed distal metaphyseal fractures of the left fibula and tibia with tibial IM layla in place.      2.  No acute fracture or dislocation.      3.  No radiopaque soft tissue foreign body.      DX-FEMUR-2+ " LEFT   Final Result      1.  No radiographic evidence of acute traumatic injury left femur.      2.  Unchanged cortical thickening in the posterior aspect of the distal femoral diaphysis which may represent sequela of prior injury or infection.      3.  No soft tissue foreign body identified.      US-EXTREMITY VENOUS LOWER UNILAT LEFT   Final Result      DX-CHEST-PORTABLE (1 VIEW)   Final Result      No acute cardiopulmonary abnormality.          Laboratory Values  Recent Labs     11/19/20  0605   WBC 7.8   RBC 3.83*   HEMOGLOBIN 10.4*   HEMATOCRIT 32.3*   MCV 84.3   MCH 27.2   MCHC 32.2*   RDW 45.6   PLATELETCT 379   MPV 9.6     Recent Labs     11/19/20  0605   SODIUM 133*   POTASSIUM 3.8   CHLORIDE 98   CO2 26   GLUCOSE 96   BUN 16             Impression: Left posterior leg nonhealing wounds    Plan:  NPO for operative LLE debridement, wound vac placement  WBAT LLE postop  Wound vac changes on floor, will have biologic dressing beneath      Operative intervention. Risks and benefits of surgery were discussed which include but are not limited to bleeding, infection, neurovascular damage, malunion, nonunion, instability, limb length discrepancy, DVT, PE, MI, Stroke and death. They understand these risks and wish to proceed.

## 2020-11-19 NOTE — ANESTHESIA PREPROCEDURE EVALUATION
Relevant Problems   PULMONARY   (+) Emphysema/COPD (HCC)      CARDIAC   (+) Essential hypertension       Physical Exam    Airway   Mallampati: II  TM distance: >3 FB  Neck ROM: full       Cardiovascular - normal exam  Rhythm: regular  Rate: normal  (-) murmur     Dental - normal exam           Pulmonary - normal exam  Breath sounds clear to auscultation     Abdominal    Neurological - normal exam                 Anesthesia Plan    ASA 2       Plan - general       Airway plan will be ETT        Induction: intravenous    Postoperative Plan: Postoperative administration of opioids is intended.    Pertinent diagnostic labs and testing reviewed    Informed Consent:    Anesthetic plan and risks discussed with patient.    Use of blood products discussed with: patient whom consented to blood products.

## 2020-11-19 NOTE — PROGRESS NOTES
Hospital Medicine Twice Weekly Progress Note    Date of Service  11/4/2020    Chief Complaint  LLE wound    Hospital Course  Mr. Varela is a 65-year-old male who presented to the emergency department with a left lower extremity wound infection. He was hospitalized at our facility in April and evaluated by orthopedic surgery who recommended wound care. Wound cultures at that time grew MSSA and Streptococcus. He had been seen at [facility] earlier that week and prescribed antibiotics, however he had not filled the prescription.  An ultrasound of that extremity was done and was negative for DVT.  He was treated for sepsis and completed an antibiotic course on 9/16/2020. He was also found to have head lice and underwent treatment for that.  A repeat wound culture was done on 9/28/2020 and grew Strep A and Proteus. Infectious disease was consulted and recommended a 5-day course of IV zosyn which was completed on 10/5/2020. On 10/12/2020 the wound care team noticed increased inflammation to the proximal part of the wound bed and switched from a vera flow wound VAC to a regular wound VAC.  ID was again consulted and on 10/14/2020 recommended to 7-day course of antibiotics with meropenem which was completed on 10/22/2020. Additionally, he was noted to have intermittent agitation so was started on risperdal, depakote, and gabapentin per psychiatric recommendations. He was deemed to be incapacitated to make medical decisions and is currently pending guardianship and placement, likely to a group home.     Interval Problem Update  Minimal pain reported in his left leg.  Scheduled for I&D with skin substitute and VAC placement tomorrow by Dr. Olvera.  Has no other complaints or concerns today.    T-max 99 °F overnight, HR 60s-90s, SBP teens-120s, O2 saturations within normal limits on room air.    Consultants/Specialty  LPS  Infectious disease  Psychiatry    Code Status  Full Code    Disposition  Pending guardianship,  then will likely pursue group home placement.     Review of Systems  Review of Systems   Constitutional: Negative for fever and malaise/fatigue.   Respiratory: Negative for cough, shortness of breath and wheezing.    Cardiovascular: Negative for chest pain and leg swelling.   Gastrointestinal: Negative for abdominal pain, constipation, diarrhea, nausea and vomiting.   Genitourinary: Negative for dysuria, frequency and urgency.   Musculoskeletal:        Mild left lower extremity pain   Neurological: Negative for dizziness, sensory change, speech change, focal weakness, weakness and headaches.   Psychiatric/Behavioral: Negative for depression. The patient is not nervous/anxious and does not have insomnia.    All other systems reviewed and are negative.     Physical Exam  Temp:  [36.4 °C (97.6 °F)-37.2 °C (99 °F)] 37.2 °C (99 °F)  Pulse:  [66-96] 96  Resp:  [16-18] 17  BP: (114-123)/(68-79) 115/79  SpO2:  [91 %-96 %] 91 %    Physical Exam  Vitals signs and nursing note reviewed.   Constitutional:       General: He is awake. He is not in acute distress.     Appearance: Normal appearance. He is well-developed. He is not ill-appearing.   HENT:      Head: Normocephalic and atraumatic.      Mouth/Throat:      Lips: Pink.      Mouth: Mucous membranes are moist.   Eyes:      Conjunctiva/sclera: Conjunctivae normal.      Pupils: Pupils are equal, round, and reactive to light.   Neck:      Musculoskeletal: Normal range of motion and neck supple.   Cardiovascular:      Rate and Rhythm: Normal rate and regular rhythm.      Pulses: Normal pulses.      Heart sounds: Normal heart sounds.   Pulmonary:      Effort: Pulmonary effort is normal.      Breath sounds: Normal breath sounds.   Abdominal:      General: Bowel sounds are normal. There is no distension or abdominal bruit.      Palpations: Abdomen is soft.      Tenderness: There is no abdominal tenderness.   Musculoskeletal:      Right lower leg: No edema.      Left lower leg: No  edema.   Skin:     General: Skin is warm and dry.      Findings: Wound present.          Neurological:      General: No focal deficit present.      Mental Status: He is alert and oriented to person, place, and time.      GCS: GCS eye subscore is 4. GCS verbal subscore is 5. GCS motor subscore is 6.   Psychiatric:         Attention and Perception: Attention and perception normal.         Mood and Affect: Mood and affect normal.         Speech: Speech normal.         Behavior: Behavior normal. Behavior is cooperative.         Thought Content: Thought content normal.         Cognition and Memory: Cognition normal. He exhibits impaired recent memory.         Judgment: Judgment normal.     Fluids    Intake/Output Summary (Last 24 hours) at 11/18/2020 1628  Last data filed at 11/18/2020 1300  Gross per 24 hour   Intake 800 ml   Output no documentation   Net 800 ml     Laboratory    Imaging  IR-MIDLINE CATHETER INSERTION WO GUIDANCE > AGE 3   Final Result                  Ultrasound-guided midline placement performed by qualified nursing staff    as above.          IR-US GUIDED PIV   Final Result    Ultrasound-guided PERIPHERAL IV INSERTION performed by    qualified nursing staff as above.      IR-MIDLINE CATHETER INSERTION WO GUIDANCE > AGE 3   Final Result                  Ultrasound-guided midline placement performed by qualified nursing staff    as above.          US-KOSTAS SINGLE LEVEL BILAT   Final Result      CT-HEAD W/O   Final Result      No acute intracranial abnormality      DX-TIBIA AND FIBULA LEFT   Final Result      1.  Healed distal metaphyseal fractures of the left fibula and tibia with tibial IM layla in place.      2.  No acute fracture or dislocation.      3.  No radiopaque soft tissue foreign body.      DX-FEMUR-2+ LEFT   Final Result      1.  No radiographic evidence of acute traumatic injury left femur.      2.  Unchanged cortical thickening in the posterior aspect of the distal femoral diaphysis which may  represent sequela of prior injury or infection.      3.  No soft tissue foreign body identified.      US-EXTREMITY VENOUS LOWER UNILAT LEFT   Final Result      DX-CHEST-PORTABLE (1 VIEW)   Final Result      No acute cardiopulmonary abnormality.         Assessment/Plan  Infection of wound due to methicillin resistant Staphylococcus aureus (MRSA)- (present on admission)  Assessment & Plan  -History of MRSA.  -Poor compliance with OP wound care.   -LPS planning for I&D with skin substitute and VAC placement tomorrow with Dr. Olvera.  -May need to restart antibiotics. Last course of IV Merrem completed Oct.    Encephalopathy- (present on admission)  Assessment & Plan  -Acute on chronic, likely due Wernicke's.  -Psychiatry: incapacitated to make medical decision or leave AMA   -Guardianship and placement pending.  -Avoid narcotics and hypnotics.    -Frequent reorientation.  -Sleep hygiene.    Psychiatric disorder  Assessment & Plan  -Unknown/unspecified.  -Psychiatry consulted and deemed him incapacitated to leave AMA or make medical decisions.  -Continue Risperdal and Depakote.    Essential hypertension- (present on admission)  Assessment & Plan  -Well-controlled on current regimen.  Continue amlodipine.    Flexion contracture of knee, 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)  Assessment & Plan  -Secondary to chronic wound in that area.  -PT/OT.  -Encourage mobility.    Emphysema/COPD (HCC)  Assessment & Plan  -Chronic and stable, without acute exacerbation.    Alcohol dependence (HCC)- (present on admission)  Assessment & Plan  -History of. Abstinent since admission.    Protein malnutrition (HCC)  Assessment & Plan  -Moderate/severe.  -Body mass index is 21.41 kg/m². (Stable)  -Continue TID supplements.  -Encourage PO intake.    Tobacco dependence- (present on admission)  Assessment & Plan  -Abstinent since admission.     VTE prophylaxis: Ambulation      Cassia Ohara, MSN, RN, APRN, ACNPC-AG, CCRN  Nurse Practitioner,  [facility]  [phone]    11/18/2020    4:28 PM

## 2020-11-20 PROCEDURE — 700102 HCHG RX REV CODE 250 W/ 637 OVERRIDE(OP): Performed by: NURSE PRACTITIONER

## 2020-11-20 PROCEDURE — 770001 HCHG ROOM/CARE - MED/SURG/GYN PRIV*

## 2020-11-20 PROCEDURE — 700101 HCHG RX REV CODE 250: Performed by: NURSE PRACTITIONER

## 2020-11-20 PROCEDURE — A9270 NON-COVERED ITEM OR SERVICE: HCPCS | Performed by: NURSE PRACTITIONER

## 2020-11-20 PROCEDURE — 29581 APPL MULTLAYER CMPRN SYS LEG: CPT

## 2020-11-20 PROCEDURE — 99232 SBSQ HOSP IP/OBS MODERATE 35: CPT | Performed by: HOSPITALIST

## 2020-11-20 RX ADMIN — CYCLOBENZAPRINE 10 MG: 10 TABLET, FILM COATED ORAL at 05:09

## 2020-11-20 RX ADMIN — Medication 400 MG: at 17:55

## 2020-11-20 RX ADMIN — OXYCODONE HYDROCHLORIDE 10 MG: 10 TABLET ORAL at 23:11

## 2020-11-20 RX ADMIN — GABAPENTIN 200 MG: 100 CAPSULE ORAL at 13:45

## 2020-11-20 RX ADMIN — DIVALPROEX SODIUM 125 MG: 125 CAPSULE ORAL at 05:09

## 2020-11-20 RX ADMIN — DIVALPROEX SODIUM 125 MG: 125 CAPSULE ORAL at 13:45

## 2020-11-20 RX ADMIN — DIVALPROEX SODIUM 125 MG: 125 CAPSULE ORAL at 22:03

## 2020-11-20 RX ADMIN — GABAPENTIN 200 MG: 100 CAPSULE ORAL at 05:09

## 2020-11-20 RX ADMIN — GABAPENTIN 200 MG: 100 CAPSULE ORAL at 17:55

## 2020-11-20 RX ADMIN — LIDOCAINE 1 PATCH: 50 PATCH CUTANEOUS at 13:45

## 2020-11-20 RX ADMIN — Medication 400 MG: at 05:09

## 2020-11-20 RX ADMIN — RISPERIDONE 0.5 MG: 0.5 TABLET ORAL at 05:09

## 2020-11-20 RX ADMIN — OXYCODONE HYDROCHLORIDE 10 MG: 10 TABLET ORAL at 13:45

## 2020-11-20 RX ADMIN — RISPERIDONE 1 MG: 1 TABLET ORAL at 17:56

## 2020-11-20 ASSESSMENT — ENCOUNTER SYMPTOMS
CONSTIPATION: 0
SPEECH CHANGE: 0
WEAKNESS: 0
DEPRESSION: 0
DIZZINESS: 0
FEVER: 0
WHEEZING: 0
HEADACHES: 0
SENSORY CHANGE: 0
SHORTNESS OF BREATH: 0
NAUSEA: 0
NERVOUS/ANXIOUS: 0
FOCAL WEAKNESS: 0
COUGH: 0
ABDOMINAL PAIN: 0
VOMITING: 0
INSOMNIA: 0
DIARRHEA: 0

## 2020-11-20 ASSESSMENT — PAIN DESCRIPTION - PAIN TYPE
TYPE: ACUTE PAIN;SURGICAL PAIN
TYPE: ACUTE PAIN;SURGICAL PAIN
TYPE: SURGICAL PAIN

## 2020-11-20 ASSESSMENT — PAIN SCALES - GENERAL: PAIN_LEVEL: 2

## 2020-11-20 NOTE — WOUND TEAM
Pt had I&D on 11/19.  Has ACE from foot to thigh. Pt has VAC on wound. Removed ACE only and placed 2 - 2 layer wraps from base of toes to upper thigh.  Cut holes in wrap and nylon to allow tubing to come through and not have pressure from tubing on tissue.   Placed mepilex behind knee to protect knee crease.  Placed nylons to cover entirety of wrap.

## 2020-11-20 NOTE — PROGRESS NOTES
Received patient from surgery. C/o 2/10 pain in LLE. LLE dressing wrapped with ACE wrap CDI. Wound vac in place, machine checked.  Sitter at bedside for safety and elopement risk.   No other needs at this time.

## 2020-11-20 NOTE — DISCHARGE PLANNING
"Anticipated Discharge Disposition: Guardianship Court Hearing re-scheduled for 1/29/21 at 1000    Action: LSW facilitated Zoom mtg w/ pt for court hearing that was scheduled for today at 0845. CM leader Branden Allen attended hearing and reported that the court date was moved to 1/29/21 at 1000 as pt is contesting the guardianship and the court will need a cognitive evaluation completed and received by the court prior to the hearing.    Barriers to Discharge: Guardianship/placement    Plan: Pt is reported to be on the \"move list\" pending a move to an undetermined location by [facility] leadership w/ undetermined time frame for move.    "

## 2020-11-20 NOTE — OR SURGEON
Immediate Post OP Note    PreOp Diagnosis: Left post leg wounds x2, each about 16cm x 5cm    PostOp Diagnosis: Same    Procedure(s):  IRRIGATION AND DEBRIDEMENT, WOUND - LEG WITH WOUND VAC AND POSSIBLE SUBSTITUTE SKIN GRAFT - Wound Class: Contaminated    Surgeon(s):  Irvin Olvera M.D.    Anesthesiologist/Type of Anesthesia:  Anesthesiologist: Bart Hall M.D./General    Surgical Staff:  Circulator: Spencer Mcleod R.N.  Scrub Person: Louie Jose    Specimens removed if any:  * No specimens in log *    Estimated Blood Loss: 10cc    Findings: Large post leg wounds with adequate blood flow, no purulence    Complications: None        11/19/2020 4:29 PM Irvin Olvera M.D.

## 2020-11-20 NOTE — ANESTHESIA TIME REPORT
Anesthesia Start and Stop Event Times     Date Time Event    11/19/2020 1548 Anesthesia Start     1549 Ready for Procedure     1645 Anesthesia Stop        Responsible Staff  11/19/20    Name Role Begin End    Bart Hall M.D. Anesth 1548 1645        Preop Diagnosis (Free Text):  Pre-op Diagnosis       Left Lower Leg Wound        Preop Diagnosis (Codes):    Post op Diagnosis  Cellulitis and abscess of leg      Premium Reason  A. 3PM - 7AM    Comments:

## 2020-11-20 NOTE — PROGRESS NOTES
"I saw and examined Mr. Varela on 11/18/2020 and 11/19/2020 for the purposes of determining his capacity to make decisions about discharge from the hospital.  I have discussed the case with his bedside nurse,  on the floor. RAFITA Vernon is the primary hospital provider for the patient, I have discussed with her as well.    I have reviewed the chart focusing primarily on provider notes, I have not reviewed every note in the chart as it is quite extensive.  Events are briefly summarized: Mr Varela was admitted on 08/07/2020 with a acute on chronic infection of his left lower extremity, patient met criteria for sepsis and was treated initially with broad-spectrum antibiotics and fluid resuscitation.  Wound healing has been slow, he has been treated with additional courses of antibiotics as well as intensive inpatient wound care with overall slow progress.  Patient was seen in consultation by Deyanira Peña Psychologist on 08/09/2020, patient was deemed incapacitated to leave the hospital AGAINST MEDICAL ADVICE and guardianship was recommended.      During my first interview the patient does not have specific complaints.  He is aware that wound care is working to improve his leg, we discussed planned upcoming surgery.  He states that he is agreeable to the surgery as he has been told it will help with wound healing.  The patient tells me he came to the hospital \"yesterday.\"  He says that he does not have a source of income and says he did does not know where he would go if he left the hospital.  He is able to say that he would call 911 in an emergency.  I asked him if he knew what he guardian ones, he shrugs his shoulders and says \"someone who watches out for you.\"  I asked him if he thinks he needs a guardian, he says \"I do not know.\"     On examination the patient is lying in bed, he has a sitter in the room.  The patient is alert, at times impulsive but generally cooperative and redirectable, " "he is oriented to self and year only.  Cardiac exam is regular rate and rhythm, no murmurs, his lungs are clear without crackles, his abdomen is soft.  His left lower extremity has a dressing on it that is clean dry and intact.  CT scan head performed 08/09/2020 shows no acute abnormalities    On 11/19/2020 I returned to the patient's room. Despite my 20-minute encounter from the day before the patient has absolutely no recollection of meeting me, he is again completely disoriented to the events which occurred during his current hospitalization other than having a wound on his leg, he again states that he came to the hospital \"yesterday.\"   APRN caring for the patient at this time confirms that the patient has profound short-term memory loss and that is exactly what she would expect if she spoke with him on consecutive days.    In my opinion, the patient is incapacitated to make medical decisions about discharge from the hospital at this time..  He does not have the cognitive ability to understand any consequences of his actions simply because he cannot retain any information about recent events.  I do not believe the underlying cause is primarily psychiatric and for that reason I have not reconsulted the psychiatry service.  I suspect this is not a reversible condition.  In his current state, he would be unable to care for himself in the community without assistance.  Efforts to obtain a public guardian should continue.    Total visit time = 40 minutes; more than 50% spent on counseling and coordination of care including interview of the patient, review of the chart, discussion with social work and APRN                "

## 2020-11-20 NOTE — PROGRESS NOTES
Hospital Medicine Twice Weekly Progress Note    Date of Service  11/4/2020    Chief Complaint  LLE wound    Hospital Course  Mr. Varela is a 65-year-old male who presented to the emergency department with a left lower extremity wound infection. He was hospitalized at our facility in April and evaluated by orthopedic surgery who recommended wound care. Wound cultures at that time grew MSSA and Streptococcus. He had been seen at [facility] earlier that week and prescribed antibiotics, however he had not filled the prescription.  An ultrasound of that extremity was done and was negative for DVT.  He was treated for sepsis and completed an antibiotic course on 9/16/2020. He was also found to have head lice and underwent treatment for that.  A repeat wound culture was done on 9/28/2020 and grew Strep A and Proteus. Infectious disease was consulted and recommended a 5-day course of IV zosyn which was completed on 10/5/2020. On 10/12/2020 the wound care team noticed increased inflammation to the proximal part of the wound bed and switched from a vera flow wound VAC to a regular wound VAC.  ID was again consulted and on 10/14/2020 recommended to 7-day course of antibiotics with meropenem which was completed on 10/22/2020. Additionally, he was noted to have intermittent agitation so was started on risperdal, depakote, and gabapentin per psychiatric recommendations. He was deemed to be incapacitated to make medical decisions and is currently pending guardianship and placement, likely to a group home.     Interval Problem Update  S/p LLE debridement and wound vac placement on 11/20/20   Pain well control   VS stable   Labs unremarkable   Has wound vac in place     Consultants/Specialty  LPS  Infectious disease  Psychiatry    Code Status  Full Code    Disposition  Pending guardianship, then will likely pursue group home placement.     Review of Systems  Review of Systems   Constitutional: Negative for fever and  malaise/fatigue.   Respiratory: Negative for cough, shortness of breath and wheezing.    Cardiovascular: Negative for chest pain and leg swelling.   Gastrointestinal: Negative for abdominal pain, constipation, diarrhea, nausea and vomiting.   Genitourinary: Negative for dysuria, frequency and urgency.   Musculoskeletal:        Mild left lower extremity pain   Neurological: Negative for dizziness, sensory change, speech change, focal weakness, weakness and headaches.   Psychiatric/Behavioral: Negative for depression. The patient is not nervous/anxious and does not have insomnia.    All other systems reviewed and are negative.     Physical Exam  Temp:  [36.1 °C (97 °F)-36.5 °C (97.7 °F)] 36.4 °C (97.5 °F)  Pulse:  [60-79] 77  Resp:  [14-24] 14  BP: ()/(56-77) 104/69  SpO2:  [91 %-99 %] 93 %    Physical Exam  Vitals signs and nursing note reviewed.   Constitutional:       General: He is awake. He is not in acute distress.     Appearance: Normal appearance. He is well-developed. He is not ill-appearing.   HENT:      Head: Normocephalic and atraumatic.      Mouth/Throat:      Lips: Pink.      Mouth: Mucous membranes are moist.   Eyes:      Conjunctiva/sclera: Conjunctivae normal.      Pupils: Pupils are equal, round, and reactive to light.   Neck:      Musculoskeletal: Normal range of motion and neck supple.   Cardiovascular:      Rate and Rhythm: Normal rate and regular rhythm.      Pulses: Normal pulses.      Heart sounds: Normal heart sounds.   Pulmonary:      Effort: Pulmonary effort is normal.      Breath sounds: Normal breath sounds.   Abdominal:      General: Bowel sounds are normal. There is no distension or abdominal bruit.      Palpations: Abdomen is soft.      Tenderness: There is no abdominal tenderness.   Musculoskeletal:      Right lower leg: No edema.      Left lower leg: No edema.   Skin:     General: Skin is warm and dry.             Comments: LLE wound vac in place   Neurological:      General: No  focal deficit present.      Mental Status: He is alert and oriented to person, place, and time.      GCS: GCS eye subscore is 4. GCS verbal subscore is 5. GCS motor subscore is 6.   Psychiatric:         Attention and Perception: Attention and perception normal.         Mood and Affect: Mood and affect normal.         Speech: Speech normal.         Behavior: Behavior normal. Behavior is cooperative.         Thought Content: Thought content normal.         Cognition and Memory: Cognition normal. He exhibits impaired recent memory.         Judgment: Judgment normal.     Fluids    Intake/Output Summary (Last 24 hours) at 11/20/2020 1145  Last data filed at 11/20/2020 0518  Gross per 24 hour   Intake 470 ml   Output 200 ml   Net 270 ml     Laboratory    Imaging  IR-US GUIDED PIV   Final Result    Ultrasound-guided PERIPHERAL IV INSERTION performed by    qualified nursing staff as above.      IR-MIDLINE CATHETER INSERTION WO GUIDANCE > AGE 3   Final Result                  Ultrasound-guided midline placement performed by qualified nursing staff    as above.          IR-US GUIDED PIV   Final Result    Ultrasound-guided PERIPHERAL IV INSERTION performed by    qualified nursing staff as above.      IR-MIDLINE CATHETER INSERTION WO GUIDANCE > AGE 3   Final Result                  Ultrasound-guided midline placement performed by qualified nursing staff    as above.          US-KOSTAS SINGLE LEVEL BILAT   Final Result      CT-HEAD W/O   Final Result      No acute intracranial abnormality      DX-TIBIA AND FIBULA LEFT   Final Result      1.  Healed distal metaphyseal fractures of the left fibula and tibia with tibial IM layla in place.      2.  No acute fracture or dislocation.      3.  No radiopaque soft tissue foreign body.      DX-FEMUR-2+ LEFT   Final Result      1.  No radiographic evidence of acute traumatic injury left femur.      2.  Unchanged cortical thickening in the posterior aspect of the distal femoral diaphysis which  may represent sequela of prior injury or infection.      3.  No soft tissue foreign body identified.      US-EXTREMITY VENOUS LOWER UNILAT LEFT   Final Result      DX-CHEST-PORTABLE (1 VIEW)   Final Result      No acute cardiopulmonary abnormality.         Assessment/Plan  * Infection of wound due to methicillin resistant Staphylococcus aureus (MRSA)- (present on admission)  Assessment & Plan  -History of MRSA.  -Poor compliance with OP wound care.   -LLE wound s/p debridement and wound vac placement on 11/19/20   -pain control   -wound care and wound vac changes  -holding abx for now    Encephalopathy- (present on admission)  Assessment & Plan  -Acute on chronic, likely due Wernicke's.  -Psychiatry: incapacitated to make medical decision or leave AMA   -Guardianship and placement pending.  -Avoid narcotics and hypnotics.    -Frequent reorientation.  -Sleep hygiene.    Psychiatric disorder  Assessment & Plan  -Unknown/unspecified.  -Continue Risperdal and Depakote.    -Psychiatry consulted and deemed him incapacitated to leave AMA or make medical decisions      Essential hypertension- (present on admission)  Assessment & Plan  -Well-controlled on current regimen.  Continue amlodipine.    Flexion contracture of knee, 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)  Assessment & Plan  -Secondary to chronic wound in that area.  -PT/OT.  -Encourage mobility.    Emphysema/COPD (HCC)  Assessment & Plan  -Chronic and stable, without acute exacerbation.    Alcohol dependence (HCC)- (present on admission)  Assessment & Plan  -History of. Abstinent since admission.    Protein malnutrition (HCC)  Assessment & Plan  -Moderate/severe.  -Body mass index is 21.41 kg/m². (Stable)  -Continue TID supplements.  -Encourage PO intake.    Tobacco dependence- (present on admission)  Assessment & Plan  -Abstinent since admission.     VTE prophylaxis: Ambulation

## 2020-11-20 NOTE — OP REPORT
DATE OF SERVICE:  11/19/2020    PREOPERATIVE DIAGNOSIS:  Nonhealing ulcer of left posterior leg.    POSTOPERATIVE DIAGNOSIS:  Nonhealing ulcer of left posterior leg.    PROCEDURES:  1.  Irrigation and debridement of multiple compartments of the left leg with 2   separate 16 cm x 5 cm superficial ulcerations in posterior knee and calf.  2.  AmnioFix biologic sheet placement, left leg.  3.  Wound VAC placement, left leg, 16x5 + 16x5 cm.    SURGEON:  Irvin Olvera MD    ASSISTANT:  Teri Verdin.    ANESTHESIA:  General.    ESTIMATED BLOOD LOSS:  10 mL.    TOURNIQUET TIME:  None.    FINDINGS:  Healthy bleeding tissue to the entire surface area.  No gross   purulence, extensive fibrinous tissue able to be excised.    COMPLICATIONS:  None.    OUTCOME:  PACU.    IMPLANTS:  Mimedx AmnioFix 16x6 cm cut to fit.    COMPLICATIONS:  None.    OUTCOME:  PACU in stable condition.    HISTORY OF PRESENT ILLNESS:  This is a 65-year-old gentleman with a history of   chronic wounds on the posterior aspect of his left leg.  He has been getting   debridements on the floor and we were asked to perform a surgical irrigation   and debridement.  Risks of procedure including bleeding, infection, pain, need   for more surgery were discussed.  He provided written consent.    DESCRIPTION OF PROCEDURE:  He was taken to the operating room where general   anesthesia was induced on the gurney.  He was then placed prone on the   operating room table in superman position with all bony prominences well   padded.  His left lower extremity prepped and draped in the usual sterile   fashion with a Betadine prep and paint.  We used forceps and scalpel to excise   a significant amount of fibrinous tissue from his posterior ulcerations.    They were  by a small skin bridge.  We did not encounter any deep   pockets of purulence.  We did use forceps and scalpel to remove deeper   fibrinous tissue within the folds of the wound.  We irrigated 3 L of  sterile   normal saline through the incisions and used curettes to excise additional   nonviable tissue.  We then windowed out each wound with Ioban tape.  We cut to   fit MiMedx AmnioFix to the wound bed of each wound, then covered this with   Adaptic.  We then placed wound VAC sponges and a VAC with good suction over   the Adaptic.  We then overwrapped this with an Ace wrap.  The patient was   awakened and extubated in stable condition.    POSTOPERATIVE COURSE:  He will remain under the care of the hospitalist   service with orthopedics and limb preservation following.  We have no plans   for return to the OR.  He will have a VAC change on Monday, hopefully leaving   in place the AmnioFix.  We will continue to VAC to closure and we will repeat   operative debridement only if necessary.  Weightbearing as tolerated to left   lower extremity.       ____________________________________     MD TRINH ORTIZ / BLANCA    DD:  11/19/2020 16:43:50  DT:  11/19/2020 19:47:02    D#:  [id number]  Job#:  [id number]

## 2020-11-20 NOTE — ANESTHESIA POSTPROCEDURE EVALUATION
Patient: Bola Varela    Procedure Summary     Date: 11/19/20 Room / Location: [place] [address] / SURGERY [facility]    Anesthesia Start: 1548 Anesthesia Stop: 1645    Procedure: IRRIGATION AND DEBRIDEMENT, WOUND - LEG WITH WOUND VAC AND POSSIBLE SUBSTITUTE SKIN GRAFT (Left Leg Lower) Diagnosis: (  Left Lower Leg Wound)    Surgeons: Irvin Olvera M.D. Responsible Provider: Bart Hall M.D.    Anesthesia Type: general ASA Status: 2          Final Anesthesia Type: general  Last vitals  BP   Blood Pressure : 105/56    Temp   36.3 °C (97.3 °F)    Pulse   Pulse: 73   Resp   16    SpO2   93 %      Anesthesia Post Evaluation    Patient location during evaluation: PACU  Patient participation: complete - patient participated  Level of consciousness: awake and alert  Pain score: 2    Airway patency: patent  Anesthetic complications: no  Cardiovascular status: hemodynamically stable  Respiratory status: acceptable  Hydration status: euvolemic    PONV: none           Nurse Pain Score: 5 (NPRS)

## 2020-11-20 NOTE — PROGRESS NOTES
POST-OP NOTE FOR LIMB PRESERVATION SERVICE    SURGERY DATE: 11/19/2020    PROCEDURE: Procedure(s):  IRRIGATION AND DEBRIDEMENT, WOUND - LEG WITH WOUND VAC AND POSSIBLE SUBSTITUTE SKIN GRAFT - Wound Class: Contaminated    WEIGHT BEARING STATUS: Weight bearing as tolerated    PT CONSULT: Yes    ANTIBIOTICS: Per ID recommendation    PLAN TO RETURN TO O.R.: No    WOUND CARE PLAN: Wound Vac  3 x week - Amniofix under adaptic beneath    FOLLOW-UP: OP Wound Clinic    DURABLE MEDICAL EQUIPMENT: None    OTHER: No plan for return to OR      Irvin Olvera M.D.

## 2020-11-20 NOTE — ANESTHESIA PROCEDURE NOTES
Airway    Date/Time: 11/19/2020 3:58 PM  Performed by: Batr Hall M.D.  Authorized by: Bart Hall M.D.     Location:  OR  Urgency:  Elective  Indications for Airway Management:  Anesthesia      Spontaneous Ventilation: absent    Sedation Level:  Deep  Preoxygenated: Yes    Patient Position:  Sniffing  Final Airway Type:  Endotracheal airway  Final Endotracheal Airway:  ETT  Cuffed: Yes    Technique Used for Successful ETT Placement:  Direct laryngoscopy    Insertion Site:  Oral  Blade Type:  Chance  Laryngoscope Blade/Videolaryngoscope Blade Size:  3  ETT Size (mm):  7.0  Measured from:  Teeth  ETT to Teeth (cm):  21  Placement Verified by: auscultation and capnometry    Cormack-Lehane Classification:  Grade I - full view of glottis  Number of Attempts at Approach:  1

## 2020-11-20 NOTE — OR NURSING
VSS. Dressing c/d/i, wound vac is in place. Patient alert but confused to time, place and situation. Is his baseline per nursing. Currently cooperative. Reported moderate pain, treated with oxycodone and fentanyl per MAR. No belongings. Report given to Dipika TOBAR, awaiting transport.

## 2020-11-21 LAB
ALBUMIN SERPL BCP-MCNC: 3.6 G/DL (ref 3.2–4.9)
ALBUMIN/GLOB SERPL: 0.9 G/DL
ALP SERPL-CCNC: 91 U/L (ref 30–99)
ALT SERPL-CCNC: 11 U/L (ref 2–50)
ANION GAP SERPL CALC-SCNC: 11 MMOL/L (ref 7–16)
AST SERPL-CCNC: 14 U/L (ref 12–45)
BASOPHILS # BLD AUTO: 0.8 % (ref 0–1.8)
BASOPHILS # BLD: 0.05 K/UL (ref 0–0.12)
BILIRUB SERPL-MCNC: 0.2 MG/DL (ref 0.1–1.5)
BUN SERPL-MCNC: 16 MG/DL (ref 8–22)
CALCIUM SERPL-MCNC: 9.1 MG/DL (ref 8.5–10.5)
CHLORIDE SERPL-SCNC: 98 MMOL/L (ref 96–112)
CO2 SERPL-SCNC: 25 MMOL/L (ref 20–33)
CREAT SERPL-MCNC: 0.81 MG/DL (ref 0.5–1.4)
EOSINOPHIL # BLD AUTO: 0.17 K/UL (ref 0–0.51)
EOSINOPHIL NFR BLD: 2.8 % (ref 0–6.9)
ERYTHROCYTE [DISTWIDTH] IN BLOOD BY AUTOMATED COUNT: 46.9 FL (ref 35.9–50)
GLOBULIN SER CALC-MCNC: 3.9 G/DL (ref 1.9–3.5)
GLUCOSE SERPL-MCNC: 99 MG/DL (ref 65–99)
HCT VFR BLD AUTO: 35 % (ref 42–52)
HGB BLD-MCNC: 11 G/DL (ref 14–18)
IMM GRANULOCYTES # BLD AUTO: 0.02 K/UL (ref 0–0.11)
IMM GRANULOCYTES NFR BLD AUTO: 0.3 % (ref 0–0.9)
LYMPHOCYTES # BLD AUTO: 1.53 K/UL (ref 1–4.8)
LYMPHOCYTES NFR BLD: 24.8 % (ref 22–41)
MCH RBC QN AUTO: 26.9 PG (ref 27–33)
MCHC RBC AUTO-ENTMCNC: 31.4 G/DL (ref 33.7–35.3)
MCV RBC AUTO: 85.6 FL (ref 81.4–97.8)
MONOCYTES # BLD AUTO: 0.83 K/UL (ref 0–0.85)
MONOCYTES NFR BLD AUTO: 13.5 % (ref 0–13.4)
NEUTROPHILS # BLD AUTO: 3.56 K/UL (ref 1.82–7.42)
NEUTROPHILS NFR BLD: 57.8 % (ref 44–72)
NRBC # BLD AUTO: 0 K/UL
NRBC BLD-RTO: 0 /100 WBC
PLATELET # BLD AUTO: 419 K/UL (ref 164–446)
PMV BLD AUTO: 9.5 FL (ref 9–12.9)
POTASSIUM SERPL-SCNC: 3.9 MMOL/L (ref 3.6–5.5)
PROT SERPL-MCNC: 7.5 G/DL (ref 6–8.2)
RBC # BLD AUTO: 4.09 M/UL (ref 4.7–6.1)
SODIUM SERPL-SCNC: 134 MMOL/L (ref 135–145)
WBC # BLD AUTO: 6.2 K/UL (ref 4.8–10.8)

## 2020-11-21 PROCEDURE — 99232 SBSQ HOSP IP/OBS MODERATE 35: CPT | Performed by: HOSPITALIST

## 2020-11-21 PROCEDURE — 700102 HCHG RX REV CODE 250 W/ 637 OVERRIDE(OP): Performed by: NURSE PRACTITIONER

## 2020-11-21 PROCEDURE — A9270 NON-COVERED ITEM OR SERVICE: HCPCS | Performed by: NURSE PRACTITIONER

## 2020-11-21 PROCEDURE — 85025 COMPLETE CBC W/AUTO DIFF WBC: CPT

## 2020-11-21 PROCEDURE — 770001 HCHG ROOM/CARE - MED/SURG/GYN PRIV*

## 2020-11-21 PROCEDURE — 80053 COMPREHEN METABOLIC PANEL: CPT

## 2020-11-21 PROCEDURE — 36415 COLL VENOUS BLD VENIPUNCTURE: CPT

## 2020-11-21 PROCEDURE — 700101 HCHG RX REV CODE 250: Performed by: NURSE PRACTITIONER

## 2020-11-21 RX ADMIN — DIVALPROEX SODIUM 125 MG: 125 CAPSULE ORAL at 21:44

## 2020-11-21 RX ADMIN — LIDOCAINE 1 PATCH: 50 PATCH CUTANEOUS at 12:17

## 2020-11-21 RX ADMIN — OXYCODONE HYDROCHLORIDE 10 MG: 10 TABLET ORAL at 12:17

## 2020-11-21 RX ADMIN — RISPERIDONE 0.5 MG: 0.5 TABLET ORAL at 05:28

## 2020-11-21 RX ADMIN — OXYCODONE HYDROCHLORIDE 10 MG: 10 TABLET ORAL at 05:27

## 2020-11-21 RX ADMIN — GABAPENTIN 200 MG: 100 CAPSULE ORAL at 05:27

## 2020-11-21 RX ADMIN — RISPERIDONE 1 MG: 1 TABLET ORAL at 21:43

## 2020-11-21 RX ADMIN — GABAPENTIN 200 MG: 100 CAPSULE ORAL at 12:17

## 2020-11-21 RX ADMIN — DIVALPROEX SODIUM 125 MG: 125 CAPSULE ORAL at 05:28

## 2020-11-21 RX ADMIN — DIVALPROEX SODIUM 125 MG: 125 CAPSULE ORAL at 15:12

## 2020-11-21 RX ADMIN — Medication 400 MG: at 05:28

## 2020-11-21 RX ADMIN — AMLODIPINE BESYLATE 5 MG: 5 TABLET ORAL at 05:28

## 2020-11-21 RX ADMIN — CYCLOBENZAPRINE 10 MG: 10 TABLET, FILM COATED ORAL at 09:10

## 2020-11-21 RX ADMIN — GABAPENTIN 200 MG: 100 CAPSULE ORAL at 21:43

## 2020-11-21 RX ADMIN — CYCLOBENZAPRINE 10 MG: 10 TABLET, FILM COATED ORAL at 21:44

## 2020-11-21 RX ADMIN — HYDROXYZINE HYDROCHLORIDE 25 MG: 50 TABLET, FILM COATED ORAL at 15:15

## 2020-11-21 ASSESSMENT — ENCOUNTER SYMPTOMS
SPEECH CHANGE: 0
COUGH: 0
SHORTNESS OF BREATH: 0
NERVOUS/ANXIOUS: 0
ABDOMINAL PAIN: 0
FOCAL WEAKNESS: 0
SENSORY CHANGE: 0
CONSTIPATION: 0
WEAKNESS: 0
DIARRHEA: 0
WHEEZING: 0
FEVER: 0
INSOMNIA: 0
HEADACHES: 0
DEPRESSION: 0
NAUSEA: 0
DIZZINESS: 0
VOMITING: 0

## 2020-11-21 ASSESSMENT — PAIN DESCRIPTION - PAIN TYPE
TYPE: ACUTE PAIN

## 2020-11-21 NOTE — CARE PLAN
Problem: Safety  Goal: Will remain free from injury  Outcome: PROGRESSING AS EXPECTED  Goal: Will remain free from falls  Outcome: PROGRESSING AS EXPECTED  Note: Patient call light in reach, id band on, bed in low position, nonslip socks on, bed alarm if warranted     Problem: Infection  Goal: Will remain free from infection  Outcome: PROGRESSING AS EXPECTED  Note: Patient labs, vitals, mentation, I/os monitored throughout shift and as needed

## 2020-11-21 NOTE — PROGRESS NOTES
Hospital Medicine Twice Weekly Progress Note    Date of Service  11/4/2020    Chief Complaint  LLE wound    Hospital Course  Mr. Varela is a 65-year-old male who presented to the emergency department with a left lower extremity wound infection. He was hospitalized at our facility in April and evaluated by orthopedic surgery who recommended wound care. Wound cultures at that time grew MSSA and Streptococcus. He had been seen at [facility] earlier that week and prescribed antibiotics, however he had not filled the prescription.  An ultrasound of that extremity was done and was negative for DVT.  He was treated for sepsis and completed an antibiotic course on 9/16/2020. He was also found to have head lice and underwent treatment for that.  A repeat wound culture was done on 9/28/2020 and grew Strep A and Proteus. Infectious disease was consulted and recommended a 5-day course of IV zosyn which was completed on 10/5/2020. On 10/12/2020 the wound care team noticed increased inflammation to the proximal part of the wound bed and switched from a vera flow wound VAC to a regular wound VAC.  ID was again consulted and on 10/14/2020 recommended to 7-day course of antibiotics with meropenem which was completed on 10/22/2020. Additionally, he was noted to have intermittent agitation so was started on risperdal, depakote, and gabapentin per psychiatric recommendations. He was deemed to be incapacitated to make medical decisions and is currently pending guardianship and placement, likely to a group home.     Interval Problem Update  S/p LLE debridement and wound vac placement on 11/20/20   Pain well control on pain regimine  Wound vac in place   VS stable   Labs unremarkable     Consultants/Specialty  LPS  Infectious disease  Psychiatry    Code Status  Full Code    Disposition  Pending guardianship, then will likely pursue group home placement.     Review of Systems  Review of Systems   Constitutional: Negative for  fever and malaise/fatigue.   Respiratory: Negative for cough, shortness of breath and wheezing.    Cardiovascular: Negative for chest pain and leg swelling.   Gastrointestinal: Negative for abdominal pain, constipation, diarrhea, nausea and vomiting.   Genitourinary: Negative for dysuria, frequency and urgency.   Musculoskeletal:        Mild left lower extremity pain   Neurological: Negative for dizziness, sensory change, speech change, focal weakness, weakness and headaches.   Psychiatric/Behavioral: Negative for depression. The patient is not nervous/anxious and does not have insomnia.    All other systems reviewed and are negative.     Physical Exam  Temp:  [36.4 °C (97.5 °F)-37.6 °C (99.6 °F)] 36.4 °C (97.6 °F)  Pulse:  [61-85] 84  Resp:  [16-18] 16  BP: (108-128)/(58-79) 118/76  SpO2:  [90 %-95 %] 92 %    Physical Exam  Vitals signs and nursing note reviewed.   Constitutional:       General: He is awake. He is not in acute distress.     Appearance: Normal appearance. He is well-developed. He is not ill-appearing.   HENT:      Head: Normocephalic and atraumatic.      Mouth/Throat:      Lips: Pink.      Mouth: Mucous membranes are moist.   Eyes:      Conjunctiva/sclera: Conjunctivae normal.      Pupils: Pupils are equal, round, and reactive to light.   Neck:      Musculoskeletal: Normal range of motion and neck supple.   Cardiovascular:      Rate and Rhythm: Normal rate and regular rhythm.      Pulses: Normal pulses.      Heart sounds: Normal heart sounds.   Pulmonary:      Effort: Pulmonary effort is normal.      Breath sounds: Normal breath sounds.   Abdominal:      General: Bowel sounds are normal. There is no distension or abdominal bruit.      Palpations: Abdomen is soft.      Tenderness: There is no abdominal tenderness.   Musculoskeletal:      Right lower leg: No edema.      Left lower leg: No edema.   Skin:     General: Skin is warm and dry.             Comments: LLE wound vac in place   Neurological:       General: No focal deficit present.      Mental Status: He is alert and oriented to person, place, and time.      GCS: GCS eye subscore is 4. GCS verbal subscore is 5. GCS motor subscore is 6.   Psychiatric:         Attention and Perception: Attention and perception normal.         Mood and Affect: Mood and affect normal.         Speech: Speech normal.         Behavior: Behavior normal. Behavior is cooperative.         Thought Content: Thought content normal.         Cognition and Memory: Cognition normal. He exhibits impaired recent memory.         Judgment: Judgment normal.     Fluids  No intake or output data in the 24 hours ending 11/21/20 0945  Laboratory    Imaging  IR-US GUIDED PIV   Final Result    Ultrasound-guided PERIPHERAL IV INSERTION performed by    qualified nursing staff as above.      IR-MIDLINE CATHETER INSERTION WO GUIDANCE > AGE 3   Final Result                  Ultrasound-guided midline placement performed by qualified nursing staff    as above.          IR-US GUIDED PIV   Final Result    Ultrasound-guided PERIPHERAL IV INSERTION performed by    qualified nursing staff as above.      IR-MIDLINE CATHETER INSERTION WO GUIDANCE > AGE 3   Final Result                  Ultrasound-guided midline placement performed by qualified nursing staff    as above.          US-KOSTAS SINGLE LEVEL BILAT   Final Result      CT-HEAD W/O   Final Result      No acute intracranial abnormality      DX-TIBIA AND FIBULA LEFT   Final Result      1.  Healed distal metaphyseal fractures of the left fibula and tibia with tibial IM layla in place.      2.  No acute fracture or dislocation.      3.  No radiopaque soft tissue foreign body.      DX-FEMUR-2+ LEFT   Final Result      1.  No radiographic evidence of acute traumatic injury left femur.      2.  Unchanged cortical thickening in the posterior aspect of the distal femoral diaphysis which may represent sequela of prior injury or infection.      3.  No soft tissue foreign body  identified.      US-EXTREMITY VENOUS LOWER UNILAT LEFT   Final Result      DX-CHEST-PORTABLE (1 VIEW)   Final Result      No acute cardiopulmonary abnormality.         Assessment/Plan  * Infection of wound due to methicillin resistant Staphylococcus aureus (MRSA)- (present on admission)  Assessment & Plan  -History of MRSA.  -Poor compliance with OP wound care.   -LLE wound s/p debridement and wound vac placement on 11/19/20   - ortho following  -pain control   -wound care and wound vac changes  -holding abx for now if any signs of infection will start on abx   - check cbc in am     Encephalopathy- (present on admission)  Assessment & Plan  -Acute on chronic, likely due Wernicke's.  -Psychiatry: incapacitated to make medical decision or leave AMA   -Guardianship and placement pending.  -Avoid narcotics and hypnotics.    -Frequent reorientation  -Sleep hygiene.      Psychiatric disorder  Assessment & Plan  -Unknown/unspecified.  -Continue Risperdal and Depakote.    -Psychiatry consulted and deemed him incapacitated to leave AMA or make medical decisions      Essential hypertension- (present on admission)  Assessment & Plan  -Well-controlled on current regimen.  Continue amlodipine.    Flexion contracture of knee, 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)  Assessment & Plan  -Secondary to chronic wound in that area.  -PT/OT.  -Encourage mobility.    Emphysema/COPD (HCC)  Assessment & Plan  -Chronic and stable, without acute exacerbation.    Alcohol dependence (HCC)- (present on admission)  Assessment & Plan  -History of. Abstinent since admission.    Protein malnutrition (HCC)  Assessment & Plan  -Moderate/severe.  -Body mass index is 21.41 kg/m². (Stable)  -Continue TID supplements.  -Encourage PO intake.    Tobacco dependence- (present on admission)  Assessment & Plan  -Abstinent since admission.     VTE prophylaxis: Ambulation

## 2020-11-22 LAB
ALBUMIN SERPL BCP-MCNC: 3.4 G/DL (ref 3.2–4.9)
ALBUMIN/GLOB SERPL: 1 G/DL
ALP SERPL-CCNC: 85 U/L (ref 30–99)
ALT SERPL-CCNC: 7 U/L (ref 2–50)
ANION GAP SERPL CALC-SCNC: 8 MMOL/L (ref 7–16)
AST SERPL-CCNC: 12 U/L (ref 12–45)
BASOPHILS # BLD AUTO: 0.8 % (ref 0–1.8)
BASOPHILS # BLD: 0.05 K/UL (ref 0–0.12)
BILIRUB SERPL-MCNC: <0.2 MG/DL (ref 0.1–1.5)
BUN SERPL-MCNC: 17 MG/DL (ref 8–22)
CALCIUM SERPL-MCNC: 9.1 MG/DL (ref 8.5–10.5)
CHLORIDE SERPL-SCNC: 101 MMOL/L (ref 96–112)
CO2 SERPL-SCNC: 28 MMOL/L (ref 20–33)
CREAT SERPL-MCNC: 0.72 MG/DL (ref 0.5–1.4)
EOSINOPHIL # BLD AUTO: 0.22 K/UL (ref 0–0.51)
EOSINOPHIL NFR BLD: 3.4 % (ref 0–6.9)
ERYTHROCYTE [DISTWIDTH] IN BLOOD BY AUTOMATED COUNT: 47.3 FL (ref 35.9–50)
GLOBULIN SER CALC-MCNC: 3.3 G/DL (ref 1.9–3.5)
GLUCOSE SERPL-MCNC: 99 MG/DL (ref 65–99)
HCT VFR BLD AUTO: 30.5 % (ref 42–52)
HGB BLD-MCNC: 9.9 G/DL (ref 14–18)
IMM GRANULOCYTES # BLD AUTO: 0.02 K/UL (ref 0–0.11)
IMM GRANULOCYTES NFR BLD AUTO: 0.3 % (ref 0–0.9)
LYMPHOCYTES # BLD AUTO: 1.9 K/UL (ref 1–4.8)
LYMPHOCYTES NFR BLD: 29.3 % (ref 22–41)
MCH RBC QN AUTO: 28 PG (ref 27–33)
MCHC RBC AUTO-ENTMCNC: 32.5 G/DL (ref 33.7–35.3)
MCV RBC AUTO: 86.2 FL (ref 81.4–97.8)
MONOCYTES # BLD AUTO: 0.94 K/UL (ref 0–0.85)
MONOCYTES NFR BLD AUTO: 14.5 % (ref 0–13.4)
NEUTROPHILS # BLD AUTO: 3.36 K/UL (ref 1.82–7.42)
NEUTROPHILS NFR BLD: 51.7 % (ref 44–72)
NRBC # BLD AUTO: 0 K/UL
NRBC BLD-RTO: 0 /100 WBC
PLATELET # BLD AUTO: 403 K/UL (ref 164–446)
PMV BLD AUTO: 9.5 FL (ref 9–12.9)
POTASSIUM SERPL-SCNC: 4 MMOL/L (ref 3.6–5.5)
PROT SERPL-MCNC: 6.7 G/DL (ref 6–8.2)
RBC # BLD AUTO: 3.54 M/UL (ref 4.7–6.1)
SODIUM SERPL-SCNC: 137 MMOL/L (ref 135–145)
WBC # BLD AUTO: 6.5 K/UL (ref 4.8–10.8)

## 2020-11-22 PROCEDURE — A9270 NON-COVERED ITEM OR SERVICE: HCPCS | Performed by: NURSE PRACTITIONER

## 2020-11-22 PROCEDURE — 80053 COMPREHEN METABOLIC PANEL: CPT

## 2020-11-22 PROCEDURE — 700102 HCHG RX REV CODE 250 W/ 637 OVERRIDE(OP): Performed by: NURSE PRACTITIONER

## 2020-11-22 PROCEDURE — 700101 HCHG RX REV CODE 250: Performed by: NURSE PRACTITIONER

## 2020-11-22 PROCEDURE — 99232 SBSQ HOSP IP/OBS MODERATE 35: CPT | Performed by: HOSPITALIST

## 2020-11-22 PROCEDURE — 770001 HCHG ROOM/CARE - MED/SURG/GYN PRIV*

## 2020-11-22 PROCEDURE — 85025 COMPLETE CBC W/AUTO DIFF WBC: CPT

## 2020-11-22 PROCEDURE — 36415 COLL VENOUS BLD VENIPUNCTURE: CPT

## 2020-11-22 RX ADMIN — DIVALPROEX SODIUM 125 MG: 125 CAPSULE ORAL at 21:26

## 2020-11-22 RX ADMIN — RISPERIDONE 1 MG: 1 TABLET ORAL at 17:14

## 2020-11-22 RX ADMIN — OXYCODONE HYDROCHLORIDE 10 MG: 10 TABLET ORAL at 12:57

## 2020-11-22 RX ADMIN — GABAPENTIN 200 MG: 100 CAPSULE ORAL at 06:15

## 2020-11-22 RX ADMIN — AMLODIPINE BESYLATE 5 MG: 5 TABLET ORAL at 06:15

## 2020-11-22 RX ADMIN — DIVALPROEX SODIUM 125 MG: 125 CAPSULE ORAL at 06:15

## 2020-11-22 RX ADMIN — CYCLOBENZAPRINE 10 MG: 10 TABLET, FILM COATED ORAL at 21:26

## 2020-11-22 RX ADMIN — RISPERIDONE 0.5 MG: 0.5 TABLET ORAL at 06:16

## 2020-11-22 RX ADMIN — LIDOCAINE 1 PATCH: 50 PATCH CUTANEOUS at 12:54

## 2020-11-22 RX ADMIN — GABAPENTIN 200 MG: 100 CAPSULE ORAL at 17:14

## 2020-11-22 RX ADMIN — HYDROXYZINE HYDROCHLORIDE 25 MG: 50 TABLET, FILM COATED ORAL at 21:26

## 2020-11-22 RX ADMIN — GABAPENTIN 200 MG: 100 CAPSULE ORAL at 12:54

## 2020-11-22 RX ADMIN — Medication 400 MG: at 17:14

## 2020-11-22 RX ADMIN — Medication 400 MG: at 06:15

## 2020-11-22 RX ADMIN — DIVALPROEX SODIUM 125 MG: 125 CAPSULE ORAL at 14:13

## 2020-11-22 ASSESSMENT — ENCOUNTER SYMPTOMS
FEVER: 0
DIARRHEA: 0
WHEEZING: 0
DEPRESSION: 0
HEADACHES: 0
CONSTIPATION: 0
COUGH: 0
DIZZINESS: 0
SHORTNESS OF BREATH: 0
NAUSEA: 0
SENSORY CHANGE: 0
SPEECH CHANGE: 0
INSOMNIA: 0
FOCAL WEAKNESS: 0
WEAKNESS: 0
VOMITING: 0
ABDOMINAL PAIN: 0
NERVOUS/ANXIOUS: 0

## 2020-11-22 ASSESSMENT — PAIN DESCRIPTION - PAIN TYPE
TYPE: ACUTE PAIN
TYPE: ACUTE PAIN

## 2020-11-22 NOTE — PROGRESS NOTES
Hospital Medicine Twice Weekly Progress Note    Date of Service  11/4/2020    Chief Complaint  LLE wound    Hospital Course  Mr. Varela is a 65-year-old male who presented to the emergency department with a left lower extremity wound infection. He was hospitalized at our facility in April and evaluated by orthopedic surgery who recommended wound care. Wound cultures at that time grew MSSA and Streptococcus. He had been seen at [facility] earlier that week and prescribed antibiotics, however he had not filled the prescription.  An ultrasound of that extremity was done and was negative for DVT.  He was treated for sepsis and completed an antibiotic course on 9/16/2020. He was also found to have head lice and underwent treatment for that.  A repeat wound culture was done on 9/28/2020 and grew Strep A and Proteus. Infectious disease was consulted and recommended a 5-day course of IV zosyn which was completed on 10/5/2020. On 10/12/2020 the wound care team noticed increased inflammation to the proximal part of the wound bed and switched from a vera flow wound VAC to a regular wound VAC.  ID was again consulted and on 10/14/2020 recommended to 7-day course of antibiotics with meropenem which was completed on 10/22/2020. Additionally, he was noted to have intermittent agitation so was started on risperdal, depakote, and gabapentin per psychiatric recommendations. He was deemed to be incapacitated to make medical decisions and is currently pending guardianship and placement, likely to a group home.     Interval Problem Update  S/p LLE debridement and wound vac placement on 11/20/20   Wound vac in place     VS stable   Labs unremarkable   Denies any pain this am   Mild nausea today    Consultants/Specialty  LPS  Infectious disease  Psychiatry    Code Status  Full Code    Disposition  Pending guardianship, then will likely pursue group home placement.     Review of Systems  Review of Systems   Constitutional:  Negative for fever and malaise/fatigue.   Respiratory: Negative for cough, shortness of breath and wheezing.    Cardiovascular: Negative for chest pain and leg swelling.   Gastrointestinal: Negative for abdominal pain, constipation, diarrhea, nausea and vomiting.   Genitourinary: Negative for dysuria, frequency and urgency.   Musculoskeletal:        Mild left lower extremity pain   Neurological: Negative for dizziness, sensory change, speech change, focal weakness, weakness and headaches.   Psychiatric/Behavioral: Negative for depression. The patient is not nervous/anxious and does not have insomnia.    All other systems reviewed and are negative.     Physical Exam       Physical Exam  Vitals signs and nursing note reviewed.   Constitutional:       General: He is awake. He is not in acute distress.     Appearance: Normal appearance. He is well-developed. He is not ill-appearing.   HENT:      Head: Normocephalic and atraumatic.      Mouth/Throat:      Lips: Pink.      Mouth: Mucous membranes are moist.   Eyes:      Conjunctiva/sclera: Conjunctivae normal.      Pupils: Pupils are equal, round, and reactive to light.   Neck:      Musculoskeletal: Normal range of motion and neck supple.   Cardiovascular:      Rate and Rhythm: Normal rate and regular rhythm.      Pulses: Normal pulses.      Heart sounds: Normal heart sounds.   Pulmonary:      Effort: Pulmonary effort is normal.      Breath sounds: Normal breath sounds.   Abdominal:      General: Bowel sounds are normal. There is no distension or abdominal bruit.      Palpations: Abdomen is soft.      Tenderness: There is no abdominal tenderness.   Musculoskeletal:      Right lower leg: No edema.      Left lower leg: No edema.   Skin:     General: Skin is warm and dry.             Comments: LLE wound vac in place   Neurological:      General: No focal deficit present.      Mental Status: He is alert and oriented to person, place, and time.      GCS: GCS eye subscore is 4.  GCS verbal subscore is 5. GCS motor subscore is 6.   Psychiatric:         Attention and Perception: Attention and perception normal.         Mood and Affect: Mood and affect normal.         Speech: Speech normal.         Behavior: Behavior normal. Behavior is cooperative.         Thought Content: Thought content normal.         Cognition and Memory: Cognition normal. He exhibits impaired recent memory.         Judgment: Judgment normal.     Fluids  No intake or output data in the 24 hours ending 11/22/20 1032  Laboratory    Imaging  IR-US GUIDED PIV   Final Result    Ultrasound-guided PERIPHERAL IV INSERTION performed by    qualified nursing staff as above.      IR-MIDLINE CATHETER INSERTION WO GUIDANCE > AGE 3   Final Result                  Ultrasound-guided midline placement performed by qualified nursing staff    as above.          IR-US GUIDED PIV   Final Result    Ultrasound-guided PERIPHERAL IV INSERTION performed by    qualified nursing staff as above.      IR-MIDLINE CATHETER INSERTION WO GUIDANCE > AGE 3   Final Result                  Ultrasound-guided midline placement performed by qualified nursing staff    as above.          US-KOSTAS SINGLE LEVEL BILAT   Final Result      CT-HEAD W/O   Final Result      No acute intracranial abnormality      DX-TIBIA AND FIBULA LEFT   Final Result      1.  Healed distal metaphyseal fractures of the left fibula and tibia with tibial IM layla in place.      2.  No acute fracture or dislocation.      3.  No radiopaque soft tissue foreign body.      DX-FEMUR-2+ LEFT   Final Result      1.  No radiographic evidence of acute traumatic injury left femur.      2.  Unchanged cortical thickening in the posterior aspect of the distal femoral diaphysis which may represent sequela of prior injury or infection.      3.  No soft tissue foreign body identified.      US-EXTREMITY VENOUS LOWER UNILAT LEFT   Final Result      DX-CHEST-PORTABLE (1 VIEW)   Final Result      No acute  cardiopulmonary abnormality.         Assessment/Plan  * Infection of wound due to methicillin resistant Staphylococcus aureus (MRSA)- (present on admission)  Assessment & Plan  -History of MRSA.  -Poor compliance with OP wound care.   -LLE wound s/p debridement and wound vac placement on 11/19/20   - ortho following  -pain control   -wound care and wound vac changes   -no evidence of acute infection hold abx for now    Encephalopathy- (present on admission)  Assessment & Plan  -Acute on chronic, likely due Wernicke's.  -Psychiatry: incapacitated to make medical decision or leave AMA   -Guardianship and placement pending.  -Avoid narcotics and hypnotics.    -Frequent reorientation  -Sleep hygiene.      Psychiatric disorder  Assessment & Plan  -Unknown/unspecified.  -Continue Risperdal and Depakote.    -Psychiatry consulted and deemed him incapacitated to leave AMA or make medical decisions  - pending guardianship      Essential hypertension- (present on admission)  Assessment & Plan  -Well-controlled on current regimen.  Continue amlodipine.    Flexion contracture of knee, 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)  Assessment & Plan  -Secondary to chronic wound in that area.  -PT/OT.  -Encourage mobility.    Emphysema/COPD (HCC)  Assessment & Plan  -Chronic and stable, without acute exacerbation.    Alcohol dependence (HCC)- (present on admission)  Assessment & Plan  -History of. Abstinent since admission.    Protein malnutrition (HCC)  Assessment & Plan  -Moderate/severe.  -Body mass index is 21.41 kg/m². (Stable)  -Continue TID supplements.  -Encourage PO intake.    Tobacco dependence- (present on admission)  Assessment & Plan  -Abstinent since admission.     VTE prophylaxis: Ambulation

## 2020-11-23 PROBLEM — R11.0 NAUSEA: Status: ACTIVE | Noted: 2020-11-23

## 2020-11-23 LAB
EKG IMPRESSION: NORMAL
TROPONIN T SERPL-MCNC: 8 NG/L (ref 6–19)

## 2020-11-23 PROCEDURE — 97606 NEG PRS WND THER DME>50 SQCM: CPT

## 2020-11-23 PROCEDURE — 99232 SBSQ HOSP IP/OBS MODERATE 35: CPT | Performed by: HOSPITALIST

## 2020-11-23 PROCEDURE — A9270 NON-COVERED ITEM OR SERVICE: HCPCS | Performed by: NURSE PRACTITIONER

## 2020-11-23 PROCEDURE — 36415 COLL VENOUS BLD VENIPUNCTURE: CPT

## 2020-11-23 PROCEDURE — 700102 HCHG RX REV CODE 250 W/ 637 OVERRIDE(OP): Performed by: NURSE PRACTITIONER

## 2020-11-23 PROCEDURE — 99231 SBSQ HOSP IP/OBS SF/LOW 25: CPT | Performed by: NURSE PRACTITIONER

## 2020-11-23 PROCEDURE — 84484 ASSAY OF TROPONIN QUANT: CPT

## 2020-11-23 PROCEDURE — 93010 ELECTROCARDIOGRAM REPORT: CPT | Performed by: INTERNAL MEDICINE

## 2020-11-23 PROCEDURE — A9270 NON-COVERED ITEM OR SERVICE: HCPCS | Performed by: HOSPITALIST

## 2020-11-23 PROCEDURE — 770001 HCHG ROOM/CARE - MED/SURG/GYN PRIV*

## 2020-11-23 PROCEDURE — 700102 HCHG RX REV CODE 250 W/ 637 OVERRIDE(OP): Performed by: HOSPITALIST

## 2020-11-23 PROCEDURE — 93005 ELECTROCARDIOGRAM TRACING: CPT | Performed by: HOSPITALIST

## 2020-11-23 PROCEDURE — 700101 HCHG RX REV CODE 250: Performed by: NURSE PRACTITIONER

## 2020-11-23 PROCEDURE — 700111 HCHG RX REV CODE 636 W/ 250 OVERRIDE (IP): Performed by: HOSPITALIST

## 2020-11-23 RX ORDER — ONDANSETRON 2 MG/ML
4 INJECTION INTRAMUSCULAR; INTRAVENOUS EVERY 4 HOURS PRN
Status: DISCONTINUED | OUTPATIENT
Start: 2020-11-23 | End: 2021-01-13 | Stop reason: ALTCHOICE

## 2020-11-23 RX ADMIN — ENOXAPARIN SODIUM 40 MG: 40 INJECTION SUBCUTANEOUS at 12:25

## 2020-11-23 RX ADMIN — OXYCODONE HYDROCHLORIDE 10 MG: 10 TABLET ORAL at 02:09

## 2020-11-23 RX ADMIN — OXYCODONE HYDROCHLORIDE 10 MG: 10 TABLET ORAL at 11:39

## 2020-11-23 RX ADMIN — RISPERIDONE 0.5 MG: 0.5 TABLET ORAL at 06:01

## 2020-11-23 RX ADMIN — DIVALPROEX SODIUM 125 MG: 125 CAPSULE ORAL at 06:01

## 2020-11-23 RX ADMIN — Medication 400 MG: at 16:12

## 2020-11-23 RX ADMIN — OXYCODONE HYDROCHLORIDE 10 MG: 10 TABLET ORAL at 21:57

## 2020-11-23 RX ADMIN — GABAPENTIN 200 MG: 100 CAPSULE ORAL at 16:12

## 2020-11-23 RX ADMIN — RISPERIDONE 1 MG: 1 TABLET ORAL at 16:13

## 2020-11-23 RX ADMIN — DIVALPROEX SODIUM 125 MG: 125 CAPSULE ORAL at 21:57

## 2020-11-23 RX ADMIN — GABAPENTIN 200 MG: 100 CAPSULE ORAL at 06:01

## 2020-11-23 RX ADMIN — Medication 400 MG: at 06:01

## 2020-11-23 RX ADMIN — LIDOCAINE 1 PATCH: 50 PATCH CUTANEOUS at 12:26

## 2020-11-23 RX ADMIN — GABAPENTIN 200 MG: 100 CAPSULE ORAL at 11:38

## 2020-11-23 RX ADMIN — AMLODIPINE BESYLATE 5 MG: 5 TABLET ORAL at 06:01

## 2020-11-23 RX ADMIN — DIVALPROEX SODIUM 125 MG: 125 CAPSULE ORAL at 16:12

## 2020-11-23 RX ADMIN — LIDOCAINE HYDROCHLORIDE 15 ML: 20 SOLUTION OROPHARYNGEAL at 12:25

## 2020-11-23 ASSESSMENT — ENCOUNTER SYMPTOMS
NERVOUS/ANXIOUS: 0
VOMITING: 0
FOCAL WEAKNESS: 0
HEADACHES: 0
INSOMNIA: 0
ABDOMINAL PAIN: 0
WHEEZING: 0
DEPRESSION: 0
DIARRHEA: 0
SHORTNESS OF BREATH: 0
FEVER: 0
DIZZINESS: 0
COUGH: 0
NAUSEA: 1
SENSORY CHANGE: 0
WEAKNESS: 0
SPEECH CHANGE: 0
CONSTIPATION: 0

## 2020-11-23 ASSESSMENT — PAIN DESCRIPTION - PAIN TYPE: TYPE: ACUTE PAIN

## 2020-11-23 NOTE — ASSESSMENT & PLAN NOTE
Did have aaron emild chest pain earlier today   Check troponin and ekg   Trial GI cocktail   Anti nausea medications

## 2020-11-23 NOTE — CARE PLAN
Problem: Safety  Goal: Will remain free from injury  Outcome: PROGRESSING AS EXPECTED  Note: Safety sitter at bedside. Bed in a locked and low position. Encouraged to ask for assistance prior to getting out of bed.      Problem: Infection  Goal: Will remain free from infection  Outcome: PROGRESSING AS EXPECTED  Note: Finished course of antibiotics. Monitoring for signs of worsening infection.

## 2020-11-23 NOTE — PROGRESS NOTES
AAOx3, disoriented to place. Safety sitter at bedside. C/o 8/10 pain, declining intervention at this time. -N/V. -N/T. Denies new onset of chest pain/SOB. +BS in all 4 quadrants, last BM today. Room air, satting > 90%. Wound vac in place to LLE. POC discussed, denies further needs at this time. Bed alarm on, call light within reach & hourly rounding in place.

## 2020-11-23 NOTE — PROGRESS NOTES
"WOUND CARE PROVIDER PROGRESS NOTE        HPI: 65-year-old male homelessness, EtOH use, tobacco dependence, chronic left lower extremity wound admitted 8/7/2020 with left lower extremity wound infection.  Patient was recently hospitalized at [facility] in April 2020, evaluated by orthopedic surgery who recommended wound care.  Wound culture grew MSSA and strep.  Patient was recently seen at  prior to this admission was given a prescription for antibiotics but did not fill this.  Patient reports that he has had this wound for 8 to 10 years.  He reports that he fell and went \"ass over tea kettle\".  He reports that he would clean the wound almost daily with soap and water at the homeless shelter.  Per chart review, patient reported he developed the ulcer in May 2018 when he fell while hiking.  Patient was seen at wound care clinic in August 2018.  Patient had a  assisting him while [facility] was [facility].  Wound VAC /was ordered however  had concerns with patient's compliancy and/ access to medical care.  Skilled nursing facility was contacted to have patient be admitted, however he was not accepted due to Medicaid.    Not on any blood thinners.  Patient ambulatory in [place].  Allergies reviewed.  He denies any allergies.    Surgery date: 11/19/2020 by Dr. Olvera  Procedure:1.  Irrigation and debridement of multiple compartments of the left leg with 2   separate 16 cm x 5 cm superficial ulcerations in posterior knee and calf.  2.  AmnioFix biologic sheet placement, left leg.  3.  Wound VAC placement, left leg, 16x5 + 16x5 cm.          Interval hx:   9/11/2020: pt calm cooperative. On zyvox. Seen during VAC and two layer compression wrap change. Pt tolerating VAC and wraps. Wound decreased in size.   9/18/2020: Patient denies any fevers, chills, nausea, vomiting.  He is tolerating the veraflo wound VAC and 2 layer compression wrap.  Wound is decreasing in size.  Increased " granular tissue noted, wound edges have improved however he does have rolled edges to popliteal fossa area.  Patient calm and cooperative, watching sports.  9/30/2020: Denies fevers, chills, nausea, vomiting.  Tolerating wound VAC and 2 layer compression wrap.  Refused nail care.  Wound culture positive for strep A and Proteus.  10/7/2020: completed 5 day course of zyvox. Denies fevers chills nausea vomiting.  Seen during veraflo VAC and 2 layer compression wrap change.    10/14/2020: Patient denies fevers, chills, nausea, vomiting.  Patient wound is malodorous complaining of increased pain to the wound.  Increased slough noted to wound bed despite veraflo wound VAC.  Reconsult ID.  10/16/2020: Patient seen during wound VAC change with Miranda wound care PT. patient denies fever, chills, nausea, vomiting.  Patient reports pain to wound and increase in pain with wound VAC change.  Patient has granular tissue throughout wound with mild amount of slough to posterior aspect of leg.  Malodorous with VAC removal.  Wound VAC nursing changed.  10/29/2020: Wound VAC was discontinued by wound team on 10/23/2020 due to slow progress and collagen was started.  Patient has completed 7-day antibiotic course of meropenem for multidrug resistant Proteus vulgaris.  Patient found to have lice and has been treated.  Nonfoul-smelling odor present with dressing removal.  11/5/2020: Seen with wound team during 2 layer compression dressing change and for excisional debridement.  No odor noted today.  Thick layer of biofilm noted.  Wound edges continue to improve but still remain to medial aspect of wound.  11/17/2020: Seen with wound team during dressing and 2 layer compression wrap change.  Patient with severely thick scar tissue to wound edges.  Excisional debridement with injectable lidocaine and epinephrine performed today.  Patient denies any fevers, chills, nausea, vomiting.  11/23/2020: POD #4 SP left leg I&D with biologic and VAC  placement.  VAC functioning.  Foul odor coming from wound.      Results:  Covid screen not detected 11/17/2020  CBC 11/22/2020: WBC 6.5, hemoglobin and hematocrit 9.9/30.5.  Platelet count 403.  CBC with differential 10/14/2020: WBC 6, H&H 11/33.9  Wound culture: 9/28/2020: Positive for beta-hemolytic strep group A, Proteus.    Wound cx: 9/1/2020 mrsa, diphtheroids, beta hemolytic strep group A    8/7/2020: X-ray tib-fib left:  1.  Healed distal metaphyseal fractures of the left fibula and tibia with tibial IM layla in place.     2.  No acute fracture or dislocation.     3.  No radiopaque soft tissue foreign body.      Arterial studies:   9/2/2020  Right.    Doppler waveforms of the common femoral artery are of high amplitude and    triphasic.    Doppler waveforms at the ankle are brisk and triphasic.    Ankle-brachial index is normal.       Left.    Doppler waveforms of the common femoral artery are of high amplitude and    triphasic.    Doppler waveforms at the ankle are brisk and triphasic.    Ankle-brachial index is normal.    Unable to obtained popliteal waveforms due to wound bandages.          Exam:    Palpable PT pulse to left foot +1 foot   +2 DP left foot  Difficulty palpating popliteal due to scar tissue  Able to extend left leg to 160 degrees. Able to flex left leg around 80 degrees      Left lower leg full-thickness ulcer  Severely malodorous  1 large wound has  into 2 wounds with skin bridge at popliteal fossa    Lateral ulcer:  Wound edges flush, open improved  Increase granular buds noted  Amnio fix biologic noted to distal and proximal ends.  Appears to be incorporating into wound bed  No periwound erythema  Edema controlled    Medial ulcer:  Heavy drainage extending to periwound.  Periskin red but intact.  Paste ring applied to protect periskin.  Wound edges flush, open, improved  Increased granular buds noted  Amnio fix biologic noted to distal and proximal ends of wound, appears to be  incorporating into wound bed  Edema controlled        Photos  left leg lateral posterior view  measurement: 19 x 4.8 cm by RONNI           Medial view left leg  Measurements 19.5 x 6 by RONNI centimeters              ASSESSMENT/PLAN:  65-year-old male with homelessness, EtOH use, tobacco use, and chronic left leg ulcer.  POD #4 SP left leg I&D with amnio fix and wound VAC placement by Dr. Olvera  Wound edges have improved.  Increased granular buds noted.  Both left leg ulcers severely malodorous however no cellulitis noted to periwound.  There is heavy drainage from the medial ulcer.  Discussed case with infectious disease.  No antibiotics at this time.  Patient was previously on meropenem due to multidrug resistant Proteus vulgaris and group A strep.  Will continue to monitor and increase dressing frequency to 3 times per week.        PLAN    Increase frequency of dressing change to 3 times a week.  This will better control drainage and odor.  Continue to monitor for signs and symptoms of infection.   Completed antibiotic course on 10/22/2020.   -Continue 2 layer compression wrap extending to thigh  - Patient to continue to be seen by wound care team while admitted  Patient to continue to be followed by wound care nurse practitioner       Discharge plan:  Pending guardianship      D/W: Patient, RN, Sera Simons wound care RN, Dr. Oscar

## 2020-11-24 PROCEDURE — 700102 HCHG RX REV CODE 250 W/ 637 OVERRIDE(OP): Performed by: NURSE PRACTITIONER

## 2020-11-24 PROCEDURE — 700101 HCHG RX REV CODE 250: Performed by: NURSE PRACTITIONER

## 2020-11-24 PROCEDURE — A9270 NON-COVERED ITEM OR SERVICE: HCPCS | Performed by: NURSE PRACTITIONER

## 2020-11-24 PROCEDURE — 770001 HCHG ROOM/CARE - MED/SURG/GYN PRIV*

## 2020-11-24 PROCEDURE — 700111 HCHG RX REV CODE 636 W/ 250 OVERRIDE (IP): Performed by: HOSPITALIST

## 2020-11-24 RX ADMIN — DIVALPROEX SODIUM 125 MG: 125 CAPSULE ORAL at 21:50

## 2020-11-24 RX ADMIN — DIVALPROEX SODIUM 125 MG: 125 CAPSULE ORAL at 14:28

## 2020-11-24 RX ADMIN — LIDOCAINE 1 PATCH: 50 PATCH CUTANEOUS at 14:28

## 2020-11-24 RX ADMIN — GABAPENTIN 200 MG: 100 CAPSULE ORAL at 04:46

## 2020-11-24 RX ADMIN — GABAPENTIN 200 MG: 100 CAPSULE ORAL at 19:02

## 2020-11-24 RX ADMIN — OXYCODONE HYDROCHLORIDE 10 MG: 10 TABLET ORAL at 21:50

## 2020-11-24 RX ADMIN — OXYCODONE HYDROCHLORIDE 10 MG: 10 TABLET ORAL at 04:46

## 2020-11-24 RX ADMIN — OXYCODONE HYDROCHLORIDE 10 MG: 10 TABLET ORAL at 14:28

## 2020-11-24 RX ADMIN — Medication 400 MG: at 19:02

## 2020-11-24 RX ADMIN — RISPERIDONE 0.5 MG: 0.5 TABLET ORAL at 04:46

## 2020-11-24 RX ADMIN — GABAPENTIN 200 MG: 100 CAPSULE ORAL at 14:28

## 2020-11-24 RX ADMIN — RISPERIDONE 1 MG: 1 TABLET ORAL at 19:02

## 2020-11-24 RX ADMIN — DIVALPROEX SODIUM 125 MG: 125 CAPSULE ORAL at 04:46

## 2020-11-24 RX ADMIN — Medication 400 MG: at 04:55

## 2020-11-24 RX ADMIN — ENOXAPARIN SODIUM 40 MG: 40 INJECTION SUBCUTANEOUS at 04:47

## 2020-11-24 ASSESSMENT — PAIN DESCRIPTION - PAIN TYPE
TYPE: ACUTE PAIN
TYPE: ACUTE PAIN

## 2020-11-24 NOTE — PROGRESS NOTES
Assumed pt care at 0700. Received report from NOC RN. A&O x3, disoriented to time. Pt denies pain at this time. Respirations even and unlabored on RA. Wound vac in place to LLE. Safety sitter at bedside.    Updated on POC, communication board updated. Bed locked and in lowest position. Call light and belongings within reach. Non-skid socks in place. Needs met, will continue to monitor.

## 2020-11-25 PROCEDURE — A9270 NON-COVERED ITEM OR SERVICE: HCPCS | Performed by: NURSE PRACTITIONER

## 2020-11-25 PROCEDURE — 700111 HCHG RX REV CODE 636 W/ 250 OVERRIDE (IP): Performed by: HOSPITALIST

## 2020-11-25 PROCEDURE — 700102 HCHG RX REV CODE 250 W/ 637 OVERRIDE(OP): Performed by: NURSE PRACTITIONER

## 2020-11-25 PROCEDURE — 97605 NEG PRS WND THER DME<=50SQCM: CPT | Mod: XU

## 2020-11-25 PROCEDURE — 700101 HCHG RX REV CODE 250: Performed by: NURSE PRACTITIONER

## 2020-11-25 PROCEDURE — 770001 HCHG ROOM/CARE - MED/SURG/GYN PRIV*

## 2020-11-25 RX ADMIN — GABAPENTIN 200 MG: 100 CAPSULE ORAL at 12:34

## 2020-11-25 RX ADMIN — OXYCODONE HYDROCHLORIDE 10 MG: 10 TABLET ORAL at 04:50

## 2020-11-25 RX ADMIN — OXYCODONE HYDROCHLORIDE 10 MG: 10 TABLET ORAL at 20:03

## 2020-11-25 RX ADMIN — DIVALPROEX SODIUM 125 MG: 125 CAPSULE ORAL at 12:34

## 2020-11-25 RX ADMIN — RISPERIDONE 0.5 MG: 0.5 TABLET ORAL at 04:50

## 2020-11-25 RX ADMIN — OXYCODONE HYDROCHLORIDE 10 MG: 10 TABLET ORAL at 12:38

## 2020-11-25 RX ADMIN — GABAPENTIN 200 MG: 100 CAPSULE ORAL at 04:50

## 2020-11-25 RX ADMIN — LIDOCAINE 1 PATCH: 50 PATCH CUTANEOUS at 12:35

## 2020-11-25 RX ADMIN — RISPERIDONE 1 MG: 1 TABLET ORAL at 18:09

## 2020-11-25 RX ADMIN — DIVALPROEX SODIUM 125 MG: 125 CAPSULE ORAL at 20:03

## 2020-11-25 RX ADMIN — ENOXAPARIN SODIUM 40 MG: 40 INJECTION SUBCUTANEOUS at 04:50

## 2020-11-25 RX ADMIN — Medication 400 MG: at 04:50

## 2020-11-25 RX ADMIN — Medication 400 MG: at 18:09

## 2020-11-25 RX ADMIN — HYDROXYZINE HYDROCHLORIDE 25 MG: 50 TABLET, FILM COATED ORAL at 20:03

## 2020-11-25 RX ADMIN — AMLODIPINE BESYLATE 5 MG: 5 TABLET ORAL at 04:50

## 2020-11-25 RX ADMIN — GABAPENTIN 200 MG: 100 CAPSULE ORAL at 18:09

## 2020-11-25 RX ADMIN — DIVALPROEX SODIUM 125 MG: 125 CAPSULE ORAL at 04:50

## 2020-11-25 ASSESSMENT — PAIN DESCRIPTION - PAIN TYPE
TYPE: ACUTE PAIN
TYPE: ACUTE PAIN

## 2020-11-25 NOTE — WOUND TEAM
[facility]  Inpatient Services  Wound and Skin Care Progress Note    Admission Date: 8/7/2020     Last order of IP CONSULT TO WOUND CARE was found on 9/1/2020 from Hospital Encounter on 8/7/2020     HPI, PMH, SH: Reviewed    Unit where seen by Wound Team: [id number]    WOUND CONSULT/FOLLOW UP RELATED TO:  Left leg follow-up    Self Report / Pain Level: pain with debridement, topical lidocaine used    OBJECTIVE:  2 layer wrap intact, Tele Sitter in place and on.     WOUND TYPE, LOCATION, CHARACTERISTICS (Pressure Injuries: location, stage, POA or date identified)      Negative Pressure Wound Therapy 11/20/20 Leg Lateral;Posterior;Medial Left (Active)   NPWT Pump Mode / Pressure Setting Ulta;Continuous;125 mmHg 11/25/20 1300   Dressing Type Medium;Black Foam (Regular) 11/25/20 1300   Number of Foam Pieces Used 4 11/23/20 1200   Canister Changed No 11/25/20 1300   Output (mL) 100 mL 11/25/20 0500   NEXT Dressing Change/Treatment Date 11/25/20 11/23/20 2124   WOUND NURSE ONLY - Time Spent with Patient (mins) 75 11/23/20 1200           Wound 11/19/20 Full Thickness Wound Leg Lateral;Posterior;Medial Left BIOLOGIC IN PLACE under adaptic DO NOT TOUCH ADAPTIC (Active)   Wound Image    11/25/20 0805   Site Assessment Red;White;Yellow 11/25/20 1300   Periwound Assessment Maceration;Denuded;Clean;Dry;Intact 11/25/20 1300   Margins Defined edges;Attached edges 11/25/20 1300   Closure Secondary intention 11/25/20 1300   Drainage Amount Copious 11/25/20 1300   Drainage Description Serosanguineous 11/25/20 1300   Treatments Site care 11/23/20 1200   Wound Cleansing Not Applicable 11/25/20 1300   Periwound Protectant Benzoin;Paste Ring;Drape 11/25/20 1300   Dressing Cleansing/Solutions Not Applicable 11/25/20 1300   Dressing Options Wound Vac;Compression Wrap Two Layer 11/25/20 1300   Dressing Changed Observed 11/24/20 0852   Dressing Status Clean;Dry;Intact 11/25/20 1300   Dressing Change/Treatment Frequency By Wound  Team Only 20 1300   NEXT Dressing Change/Treatment Date 20 1939   NEXT Weekly Photo (Inpatient Only) 20 1200   Shape Irregular 20 1200   Pulses Left;2+;DP;PT 20 1200   Exposed Structures None 20 1200   WOUND NURSE ONLY - Time Spent with Patient (mins) 75 20 1200          VASCULAR    CESAR:   CESAR Results, Last 30 Days Us-cesar Single Level Bilat    Result Date: 2020  Narrative  Vascular Laboratory  Conclusions  1.  Normal bilateral CESAR's.  GRUPO GANN  Age:    64    Gender:     M  MRN:    [id number]  :    1955      BSA:  Exam Date:     2020 09:59  Room #:     Inpatient  Priority:     Routine  Ht (in):             Wt (lb):  Ordering Physician:        EDDA CANADA  Referring Physician:       [id number]  Sonographer:               Deja Ellis RDMS                             RVT  Study Type:                Complete Bilateral  Technical Quality:         Adequate  Indications:     Ulceration of LE  CPT Codes:       75103  ICD Codes:       707.1  History:         Nonhealing wound of left lower extremity.  Limitations:                 RIGHT  Waveform            Systolic BPs (mmHg)                             117           Brachial  Triphasic                                Common Femoral  Triphasic                  138           Posterior Tibial  Triphasic                  132           Dorsalis Pedis                                           Peroneal                             1.18          CESAR                                           TBI                       LEFT  Waveform        Systolic BPs (mmHg)                             114           Brachial  Triphasic                                Common Femoral  Triphasic                  127           Posterior Tibial  Triphasic                  122           Dorsalis Pedis                                            Peroneal                             1.09          KOSTAS                                           TBI  Findings  Right.  Doppler waveforms of the common femoral artery are of high amplitude and  triphasic.  Doppler waveforms at the ankle are brisk and triphasic.  Ankle-brachial index is normal.  Left.  Doppler waveforms of the common femoral artery are of high amplitude and  triphasic.  Doppler waveforms at the ankle are brisk and triphasic.  Ankle-brachial index is normal.  Unable to obtained popliteal waveforms due to wound bandages.  Additional testing was not performed in accordance with lower extremity  arterial evaluation protocol.  Clover Maya  (Electronically Signed)  Final Date:      02 September 2020                   11:14    Lab Values:    Lab Results   Component Value Date/Time    WBC 6.5 11/22/2020 02:44 AM    RBC 3.54 (L) 11/22/2020 02:44 AM    HEMOGLOBIN 9.9 (L) 11/22/2020 02:44 AM    HEMATOCRIT 30.5 (L) 11/22/2020 02:44 AM    CREACTPROT 1.07 (H) 08/12/2020 01:55 PM    SEDRATEWES 85 (H) 08/07/2020 01:20 PM    HBA1C 5.7 (H) 11/09/2018 06:30 PM        Culture Results show:  Recent Results (from the past 720 hour(s))   CULTURE WOUND W/ GRAM STAIN    Collection Time: 09/01/20  2:00 PM    Specimen: Left Leg; Wound   Result Value Ref Range    Significant Indicator POS (POS)     Source WND     Site LEFT LEG     Culture Result - (A)     Gram Stain Result       Moderate Gram positive cocci.  Moderate Gram positive rods.      Culture Result (A)      Beta Hemolytic Streptococcus group A  Moderate growth      Culture Result (A)      Methicillin Resistant Staphylococcus aureus  Moderate growth      Culture Result (A)      Diphtheroids  Moderate growth  Mixed morphologies.         INTERVENTIONS BY WOUND TEAM:    Dressings removed and addison-wound cleansed with wound cleanser and patted dry.  Adaptic was lifted and measurement and pictures taken.  Adaptic secured with steri-strips.  Addison-wound skin protected with  benzoin, drape and paste ring at the distal end of the medial aspect of woundbed.  4 pieces of black foam to the wound bed, secured with drape.  TRAC pad placed.  NPWT resumed, no leaks noted.   2 layer compression wrap to the left leg to the mid-thigh.        Interdisciplinary consultation: Patient, Bedside RN, Asaf ELLER LPS      EVALUATION / RATIONALE FOR TREATMENT:    11/23: POD#4 s/p I&D of left LE wounds and biologic placed by Dr. Olvera on 11/19.  Wound bed is flatter and raised edges scar tissue seems to be gone.   11/14 Posterior thigh aspect of wound deteriorating, will increase frequency of treatment to keep biofilm down and hopefully avoid systemic abx.  Will include thigh in compression wrap.  Will consider culture if improvement is not seen in the next few treatments.    11/10: APRN unavailable at this time.  Will re-schedule excisional debridement to next week.   11/5: Plan for debridement of scarred edges on Tues by LPS. Tendon exposed to posterior aspect of wound, behind knee. Continue with current dressing care.  10/29: Excisional debridement by Asaf ELLER of scar tissue along the proximal edge of the posterior knee and lateral thigh.  Lateral aspect of thigh is flatten.  Medial aspect of knee has thick scar tissue that was excisionally debrided by Asaf ELLER.  Fully granulated throughout the wound bed.   10/23 wound bed mostly granular, superficial in depth, progress has been slow with the wound VAC so will trial collagen product to encourage new tissue growth.    10/19: wound bed has improved in color and is fully granulated.  Meredith-wound has improved, denudation has resolved. APRN continuing with serial weekly debridements as needed.   10/16: wound friable pt now on iv abx per ID.  Indurated edges addressed with drape and foam.  Meredith wound likely has yeast infection - addressing with silver powder crusting  10/14: patient seen with Asaf ELLER, stopping veraflo  instillation.  Starting silver powder and regular wound VAC to see if it will help with bioburden.  Possible colonization of bacteria.  ID involved.   10/12: Inflammation noted to the proximal part of the wound bed.  Will continue to monitor, possible vac break on Wednesday to help addison-skin inflammation.   10/7: Excisional debridement performed by Asaf ELLER. Will need to continue selective debridement with NPWT changes, and weekly excisional debridement with APRN to flatten out the scar tissue.  IV abx therapy ended 10/5.   10/5: Lateral wound still with some slough, both wounds smaller per measurements. Medial wound with all granular tissue.  9/30: Patient to start abx therapy for 7 days course per Dr. Ellis.  Started dakin's instillation to veraflo  9/28: malodorous today, culture taken.    9/25: Odor noted, though unclear if from wound or pt, consider shower before next Vac change. Consider regular vac next change, wound granular and superficial.   9/23: Patient still awaiting guardianship for placement.   9/18: wound granulating nicely and responding well to current treatment.    9/16: Wound bed with granular tissue, edges are thick but appear better than previous assessments. . NPWT VF to encourage closure by secondary intention and manage drainage while encouraging oxygenation and granulation to the wound bed. 2 layer compression to assist in decrease of swelling and encourage blood flow to wounds. Wound team to follow up and change 3x/week.      Goals: Steady decrease in wound area and depth weekly.    NURSING PLAN OF CARE ORDERS (X):    Dressing changes: See Dressing Care orders: X  Skin care: See Skin Care orders:   Rectal tube care: See Rectal Tube Care orders:   Other orders:      WOUND TEAM PLAN OF CARE:   Dressing changes by wound team:        Follow up 3 times weekly:                NPWT change 3 times weekly:  X,  NPWT changes 3x/ weekly with 2 layer compression wrap.   Follow up 1-2  times weekly:      Follow up Bi-Monthly:                   Follow up as needed:       Other (explain):     Anticipated discharge plans:  LTACH:        SNF/Rehab: X - patient will need ongoing wound care for LLE upon discharge - 3x/weekly           Home Health Care:           Outpatient Wound Center:            Self Care:

## 2020-11-25 NOTE — CARE PLAN
Problem: Infection  Goal: Will remain free from infection  Outcome: PROGRESSING AS EXPECTED  Note: Afebrile, educated about handwashing and signs of infection, wound nurse changed vac today     Problem: Pain Management  Goal: Pain level will decrease to patient's comfort goal  Outcome: PROGRESSING AS EXPECTED  Note: Educated about the pain scale and non pharmacological pain methods, check the MAR

## 2020-11-25 NOTE — CARE PLAN
Problem: Safety  Goal: Will remain free from injury  Outcome: PROGRESSING AS EXPECTED  Goal: Will remain free from falls  Outcome: PROGRESSING AS EXPECTED     Problem: Infection  Goal: Will remain free from infection  Outcome: PROGRESSING AS EXPECTED     Problem: Venous Thromboembolism (VTW)/Deep Vein Thrombosis (DVT) Prevention:  Goal: Patient will participate in Venous Thrombosis (VTE)/Deep Vein Thrombosis (DVT)Prevention Measures  Outcome: PROGRESSING AS EXPECTED     Problem: Bowel/Gastric:  Goal: Normal bowel function is maintained or improved  Outcome: PROGRESSING AS EXPECTED  Goal: Will not experience complications related to bowel motility  Outcome: PROGRESSING AS EXPECTED     Problem: Knowledge Deficit  Goal: Knowledge of disease process/condition, treatment plan, diagnostic tests, and medications will improve  Outcome: PROGRESSING AS EXPECTED  Goal: Knowledge of the prescribed therapeutic regimen will improve  Outcome: PROGRESSING AS EXPECTED     Problem: Discharge Barriers/Planning  Goal: Patient's continuum of care needs will be met  Outcome: PROGRESSING AS EXPECTED     Problem: Fluid Volume:  Goal: Will maintain balanced intake and output  Outcome: PROGRESSING AS EXPECTED     Problem: Pain Management  Goal: Pain level will decrease to patient's comfort goal  Outcome: PROGRESSING AS EXPECTED     Problem: Psychosocial Needs:  Goal: Level of anxiety will decrease  Outcome: PROGRESSING AS EXPECTED

## 2020-11-25 NOTE — PROGRESS NOTES
Patient's wound vac was beeping, pt was seen ambulating to the toilet but the wound vac tubing was disconnected. Day RN aware and notified wound team.

## 2020-11-26 PROBLEM — R11.0 NAUSEA: Status: RESOLVED | Noted: 2020-11-23 | Resolved: 2020-11-26

## 2020-11-26 PROCEDURE — 770001 HCHG ROOM/CARE - MED/SURG/GYN PRIV*

## 2020-11-26 PROCEDURE — 700102 HCHG RX REV CODE 250 W/ 637 OVERRIDE(OP): Performed by: NURSE PRACTITIONER

## 2020-11-26 PROCEDURE — 700111 HCHG RX REV CODE 636 W/ 250 OVERRIDE (IP): Performed by: HOSPITALIST

## 2020-11-26 PROCEDURE — A9270 NON-COVERED ITEM OR SERVICE: HCPCS | Performed by: NURSE PRACTITIONER

## 2020-11-26 PROCEDURE — 700101 HCHG RX REV CODE 250: Performed by: NURSE PRACTITIONER

## 2020-11-26 PROCEDURE — 99231 SBSQ HOSP IP/OBS SF/LOW 25: CPT | Performed by: NURSE PRACTITIONER

## 2020-11-26 RX ADMIN — GABAPENTIN 200 MG: 100 CAPSULE ORAL at 17:51

## 2020-11-26 RX ADMIN — Medication 400 MG: at 05:55

## 2020-11-26 RX ADMIN — RISPERIDONE 1 MG: 1 TABLET ORAL at 17:51

## 2020-11-26 RX ADMIN — Medication 400 MG: at 17:51

## 2020-11-26 RX ADMIN — GABAPENTIN 200 MG: 100 CAPSULE ORAL at 13:18

## 2020-11-26 RX ADMIN — ENOXAPARIN SODIUM 40 MG: 40 INJECTION SUBCUTANEOUS at 05:55

## 2020-11-26 RX ADMIN — RISPERIDONE 0.5 MG: 0.5 TABLET ORAL at 05:55

## 2020-11-26 RX ADMIN — LIDOCAINE 1 PATCH: 50 PATCH CUTANEOUS at 13:18

## 2020-11-26 RX ADMIN — OXYCODONE HYDROCHLORIDE 10 MG: 10 TABLET ORAL at 08:54

## 2020-11-26 RX ADMIN — DIVALPROEX SODIUM 125 MG: 125 CAPSULE ORAL at 13:18

## 2020-11-26 RX ADMIN — OXYCODONE HYDROCHLORIDE 10 MG: 10 TABLET ORAL at 17:51

## 2020-11-26 RX ADMIN — DIVALPROEX SODIUM 125 MG: 125 CAPSULE ORAL at 22:00

## 2020-11-26 RX ADMIN — CYCLOBENZAPRINE 10 MG: 10 TABLET, FILM COATED ORAL at 01:18

## 2020-11-26 RX ADMIN — GABAPENTIN 200 MG: 100 CAPSULE ORAL at 05:55

## 2020-11-26 RX ADMIN — CYCLOBENZAPRINE 10 MG: 10 TABLET, FILM COATED ORAL at 19:21

## 2020-11-26 RX ADMIN — DIVALPROEX SODIUM 125 MG: 125 CAPSULE ORAL at 05:55

## 2020-11-26 RX ADMIN — OXYCODONE HYDROCHLORIDE 10 MG: 10 TABLET ORAL at 02:27

## 2020-11-26 ASSESSMENT — PAIN DESCRIPTION - PAIN TYPE
TYPE: ACUTE PAIN

## 2020-11-26 ASSESSMENT — ENCOUNTER SYMPTOMS
DIARRHEA: 0
SHORTNESS OF BREATH: 0
DIZZINESS: 0
FEVER: 0
NAUSEA: 0
COUGH: 0
WEAKNESS: 0
FOCAL WEAKNESS: 0
SPEECH CHANGE: 0
CHILLS: 0
CONSTIPATION: 0
ABDOMINAL PAIN: 0
PALPITATIONS: 0
FALLS: 0
INSOMNIA: 0
HEADACHES: 0
VOMITING: 0
NERVOUS/ANXIOUS: 1
SENSORY CHANGE: 0
DEPRESSION: 1

## 2020-11-26 NOTE — CARE PLAN
Problem: Pain Management  Goal: Pain level will decrease to patient's comfort goal  Outcome: PROGRESSING AS EXPECTED  Note: Educated about the pain scale and non pharmacological pain methods, check the MAR     Problem: Psychosocial Needs:  Goal: Level of anxiety will decrease  Outcome: PROGRESSING AS EXPECTED  Note: Pt. Educated about the care plan, speaking to pt. In low tone voice at eye level

## 2020-11-26 NOTE — PROGRESS NOTES
Hospital Medicine Twice Weekly Progress Note    Date of Service  11/26/2020    Chief Complaint  LLE Wound    Hospital Course  Mr. Varela is a 64-year-old male with PMH significant for homelessness, etoh use, tobacco dependence and chronic LLE wound who presented 8/7/2020 with LLE wound infection.  He was hospitalized at our facility in April and evaluated by orthopedic surgery who recommended wound care. Wound cultures at that time grew MSSA and strep. Patient reported losing his Medicaid card and having no transportation to wound clinic.  He was seen at [facility] earlier this week and prescribed ABX, however he has not filled the prescription.  US LLE negative for DVT.  Treated for sepsis with empiric ABX and wound care. He completed ABX course 9/16. He was found to have head lice and underwent treatments. Continued to have intermittent agitation so was started on risperdol, depakote and gabapentin per psych recs.  He has been deemed incapacitated to make medical decisions and is currently pending guardianship and placement.  He is medically stable and will be only be evaluated 2 times weekly unless there is a clinical change. Repeat wound culture 9/28 grew Strep A and proteus. ID was consulted and recommended 5-day IV ABX with Zosyn - completed ABX 10/5. Wound care team noticed increased inflammation to the proximal part of the wound bed on 10/12.  They switched from vera flow wound VAC to regular wound VAC.  ID was consulted again on 10/14 and recommended 7-day course ABX with meropenem with anticipated stop date 10/22.  He underwent I&D left leg with  AmnioFix biologic sheet placement and wound VAC placement by Dr. Olvera on 11/19/2020 .      Additionally, he was noted to have intermittent agitation so was started on risperdal, depakote, and gabapentin per psychiatric recommendations. He was deemed to be incapacitated to make medical decisions and is currently pending guardianship and placement,  likely to a group home.  Guardianship court hearing rescheduled for 1/29/2021 at 10AM.    Interval Problem Update  11/26 -wound VAC dressing changed yesterday  -Continues to require telesitter for impulsivity.  His gait is stable, but he has been known to ambulate to the bathroom independently and forget about his wound VAC, causing it to become disconnected  -pain controlled    Consultants/Specialty  -LPS  -Infectious disease  -Psychiatry    Code Status  Full Code    Disposition  Pending guardianship.  Then will need placement.    Review of Systems  Review of Systems   Constitutional: Negative for chills, fever and malaise/fatigue.   Respiratory: Negative for cough and shortness of breath.    Cardiovascular: Positive for leg swelling. Negative for chest pain and palpitations.   Gastrointestinal: Negative for abdominal pain, constipation, diarrhea, nausea and vomiting.   Genitourinary: Negative for dysuria, frequency and urgency.   Musculoskeletal: Negative for falls.        .   Skin: Negative for itching.   Neurological: Negative for dizziness, sensory change, speech change, focal weakness, weakness and headaches.   Psychiatric/Behavioral: Positive for depression. Negative for suicidal ideas. The patient is nervous/anxious (sometimes). The patient does not have insomnia.    All other systems reviewed and are negative.       Physical Exam  Temp:  [36.8 °C (98.2 °F)-37.3 °C (99.1 °F)] 36.8 °C (98.2 °F)  Pulse:  [68-75] 68  Resp:  [15-18] 15  BP: ()/(60-68) 99/60  SpO2:  [90 %-91 %] 91 %    Physical Exam  Vitals signs and nursing note reviewed. Exam conducted with a chaperone present.   Constitutional:       General: He is awake. He is not in acute distress.     Appearance: He is underweight. He is ill-appearing. He is not toxic-appearing or diaphoretic.   HENT:      Head: Normocephalic and atraumatic.      Nose: Nose normal.      Mouth/Throat:      Lips: Pink.      Mouth: Mucous membranes are moist.   Eyes:       General: No scleral icterus.     Conjunctiva/sclera: Conjunctivae normal.   Neck:      Musculoskeletal: Normal range of motion.   Cardiovascular:      Rate and Rhythm: Normal rate.   Pulmonary:      Effort: Pulmonary effort is normal.   Abdominal:      General: There is no distension.      Tenderness: There is no abdominal tenderness.   Musculoskeletal:      Right lower leg: No edema.      Left lower leg: No edema.      Comments: Ambulates independently with steady gait.   Skin:     General: Skin is warm and dry.      Comments: Wound vac changed 11/25, but he has removed it.   Regular dressing in place. Very foul smelling.   Neurological:      Mental Status: He is alert.      Coordination: Coordination is intact.      Gait: Gait is intact. Gait normal.      Comments: Oriented to himself and hospital   Psychiatric:         Mood and Affect: Mood normal. Affect is labile.         Behavior: Behavior is not aggressive or combative. Behavior is cooperative.         Cognition and Memory: Cognition is impaired.         Judgment: Judgment is impulsive.      Comments:            Fluids  No intake or output data in the 24 hours ending 11/26/20 1136    Laboratory                        Imaging  IR-US GUIDED PIV   Final Result    Ultrasound-guided PERIPHERAL IV INSERTION performed by    qualified nursing staff as above.      IR-MIDLINE CATHETER INSERTION WO GUIDANCE > AGE 3   Final Result                  Ultrasound-guided midline placement performed by qualified nursing staff    as above.          IR-US GUIDED PIV   Final Result    Ultrasound-guided PERIPHERAL IV INSERTION performed by    qualified nursing staff as above.      IR-MIDLINE CATHETER INSERTION WO GUIDANCE > AGE 3   Final Result                  Ultrasound-guided midline placement performed by qualified nursing staff    as above.          US-KOSTAS SINGLE LEVEL BILAT   Final Result      CT-HEAD W/O   Final Result      No acute intracranial abnormality      DX-TIBIA  AND FIBULA LEFT   Final Result      1.  Healed distal metaphyseal fractures of the left fibula and tibia with tibial IM layla in place.      2.  No acute fracture or dislocation.      3.  No radiopaque soft tissue foreign body.      DX-FEMUR-2+ LEFT   Final Result      1.  No radiographic evidence of acute traumatic injury left femur.      2.  Unchanged cortical thickening in the posterior aspect of the distal femoral diaphysis which may represent sequela of prior injury or infection.      3.  No soft tissue foreign body identified.      US-EXTREMITY VENOUS LOWER UNILAT LEFT   Final Result      DX-CHEST-PORTABLE (1 VIEW)   Final Result      No acute cardiopulmonary abnormality.           Assessment/Plan  * Infection of wound due to methicillin resistant Staphylococcus aureus (MRSA)- (present on admission)  Assessment & Plan  -History of MRSA.  -foul smelling  -Poor compliance with OP wound care. Poor compliance with wound vac at times.   -LLE wound s/p debridement and wound vac placement on 11/19/20   -Pain management  -no evidence of acute infection hold abx for now    Encephalopathy- (present on admission)  Assessment & Plan  -Acute on chronic, likely due Wernicke's.  -Psychiatry: incapacitated to make medical decision or leave AMA   -Guardianship and placement pending.  -Avoid narcotics and hypnotics.    -Frequent reorientation  -Sleep hygiene.      Psychiatric disorder  Assessment & Plan  -Unknown/unspecified.  -Continue Risperdal and Depakote.    -Psychiatry consulted and deemed him incapacitated to leave AMA or make medical decisions  - pending guardianship      Essential hypertension- (present on admission)  Assessment & Plan  -Well-controlled on current regimen.    -Continue amlodipine.    Flexion contracture of knee, 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)  Assessment & Plan  -Secondary to chronic wound in that area.  -PT/OT.  -Encourage mobility.    Emphysema/COPD (HCC)  Assessment & Plan  -Chronic and stable, without  acute exacerbation.    Alcohol dependence (HCC)- (present on admission)  Assessment & Plan  -History of. Abstinent since admission.    Protein malnutrition (HCC)  Assessment & Plan  -Moderate/severe.  -Body mass index is 21.41 kg/m². (Stable)  -Continue TID supplements.  -Encourage PO intake.    Tobacco dependence- (present on admission)  Assessment & Plan  -Abstinent since admission.       VTE prophylaxis: Enoxaparin    I have performed a physical exam and reviewed and updated ROS and Assessment/Plan today (11/26/20). In review of the previous note there are no changes except as documented above    I certify the patient requires continued medically necessary hospital services for the treatment of non-healing wound and cognitive disorder.  The patient will remain in the hospital for the foreseeable future.  Discharge may or may not occur in the next 20 days due to ongoing discharge delays due to having no medically acceptable discharge options.    Please note that this dictation was created using voice recognition software. I have made every reasonable attempt to correct obvious errors, but there may be errors of grammar and possibly content that I did not discover before finalizing the note.    LOUISE Khan.

## 2020-11-27 PROCEDURE — A9270 NON-COVERED ITEM OR SERVICE: HCPCS | Performed by: NURSE PRACTITIONER

## 2020-11-27 PROCEDURE — 700101 HCHG RX REV CODE 250: Performed by: NURSE PRACTITIONER

## 2020-11-27 PROCEDURE — 700111 HCHG RX REV CODE 636 W/ 250 OVERRIDE (IP): Performed by: HOSPITALIST

## 2020-11-27 PROCEDURE — 700102 HCHG RX REV CODE 250 W/ 637 OVERRIDE(OP): Performed by: NURSE PRACTITIONER

## 2020-11-27 PROCEDURE — 97606 NEG PRS WND THER DME>50 SQCM: CPT

## 2020-11-27 PROCEDURE — 770001 HCHG ROOM/CARE - MED/SURG/GYN PRIV*

## 2020-11-27 RX ADMIN — GABAPENTIN 200 MG: 100 CAPSULE ORAL at 11:44

## 2020-11-27 RX ADMIN — OXYCODONE HYDROCHLORIDE 10 MG: 10 TABLET ORAL at 14:59

## 2020-11-27 RX ADMIN — LIDOCAINE 1 PATCH: 50 PATCH CUTANEOUS at 11:44

## 2020-11-27 RX ADMIN — AMLODIPINE BESYLATE 5 MG: 5 TABLET ORAL at 04:14

## 2020-11-27 RX ADMIN — Medication 400 MG: at 04:14

## 2020-11-27 RX ADMIN — GABAPENTIN 200 MG: 100 CAPSULE ORAL at 04:14

## 2020-11-27 RX ADMIN — RISPERIDONE 1 MG: 1 TABLET ORAL at 17:55

## 2020-11-27 RX ADMIN — DIVALPROEX SODIUM 125 MG: 125 CAPSULE ORAL at 20:41

## 2020-11-27 RX ADMIN — GABAPENTIN 200 MG: 100 CAPSULE ORAL at 17:55

## 2020-11-27 RX ADMIN — OXYCODONE HYDROCHLORIDE 10 MG: 10 TABLET ORAL at 04:14

## 2020-11-27 RX ADMIN — DIVALPROEX SODIUM 125 MG: 125 CAPSULE ORAL at 04:14

## 2020-11-27 RX ADMIN — DIVALPROEX SODIUM 125 MG: 125 CAPSULE ORAL at 14:57

## 2020-11-27 RX ADMIN — Medication 400 MG: at 17:55

## 2020-11-27 RX ADMIN — OXYCODONE HYDROCHLORIDE 10 MG: 10 TABLET ORAL at 20:41

## 2020-11-27 RX ADMIN — ENOXAPARIN SODIUM 40 MG: 40 INJECTION SUBCUTANEOUS at 04:14

## 2020-11-27 RX ADMIN — RISPERIDONE 0.5 MG: 0.5 TABLET ORAL at 04:14

## 2020-11-27 ASSESSMENT — PAIN DESCRIPTION - PAIN TYPE: TYPE: ACUTE PAIN

## 2020-11-27 NOTE — PROGRESS NOTES
Assumed care of pt at 0745. Report received and bedside rounding completed with night RN. Pt is calm no SOB, or in any acute distress noted. Pt. Has 1:1 Sitter     Fall precautions in place - Treaded non slip socks. Bed locked. Communication board updated with POC. Call light and pt belongings within reach - hourly rounding in place.

## 2020-11-27 NOTE — CARE PLAN
Problem: Safety  Goal: Will remain free from injury  Outcome: PROGRESSING SLOWER THAN EXPECTED     Problem: Discharge Barriers/Planning  Goal: Patient's continuum of care needs will be met  Outcome: PROGRESSING SLOWER THAN EXPECTED

## 2020-11-27 NOTE — PROGRESS NOTES
Received report, assumed pt care. Pt a&o 3, VSS, assessment completed. Resting comfortably in bed with call light, bedside table in reach. No c/o at this time. Side rails up 2. Instructed to use call light when needing assistance verbalized understanding. Bed alarm on, bed in low position. Will continue to monitor.

## 2020-11-28 PROCEDURE — A9270 NON-COVERED ITEM OR SERVICE: HCPCS | Performed by: NURSE PRACTITIONER

## 2020-11-28 PROCEDURE — 700102 HCHG RX REV CODE 250 W/ 637 OVERRIDE(OP): Performed by: NURSE PRACTITIONER

## 2020-11-28 PROCEDURE — 700111 HCHG RX REV CODE 636 W/ 250 OVERRIDE (IP): Performed by: HOSPITALIST

## 2020-11-28 PROCEDURE — 770001 HCHG ROOM/CARE - MED/SURG/GYN PRIV*

## 2020-11-28 PROCEDURE — 700101 HCHG RX REV CODE 250: Performed by: NURSE PRACTITIONER

## 2020-11-28 RX ADMIN — HYDROXYZINE HYDROCHLORIDE 25 MG: 50 TABLET, FILM COATED ORAL at 09:17

## 2020-11-28 RX ADMIN — DIVALPROEX SODIUM 125 MG: 125 CAPSULE ORAL at 23:00

## 2020-11-28 RX ADMIN — AMLODIPINE BESYLATE 5 MG: 5 TABLET ORAL at 04:52

## 2020-11-28 RX ADMIN — Medication 400 MG: at 04:52

## 2020-11-28 RX ADMIN — GABAPENTIN 200 MG: 100 CAPSULE ORAL at 12:23

## 2020-11-28 RX ADMIN — DIVALPROEX SODIUM 125 MG: 125 CAPSULE ORAL at 15:20

## 2020-11-28 RX ADMIN — Medication 400 MG: at 18:20

## 2020-11-28 RX ADMIN — OXYCODONE HYDROCHLORIDE 10 MG: 10 TABLET ORAL at 12:23

## 2020-11-28 RX ADMIN — RISPERIDONE 1 MG: 1 TABLET ORAL at 18:19

## 2020-11-28 RX ADMIN — OXYCODONE HYDROCHLORIDE 10 MG: 10 TABLET ORAL at 18:20

## 2020-11-28 RX ADMIN — GABAPENTIN 200 MG: 100 CAPSULE ORAL at 18:19

## 2020-11-28 RX ADMIN — GABAPENTIN 200 MG: 100 CAPSULE ORAL at 04:52

## 2020-11-28 RX ADMIN — RISPERIDONE 0.5 MG: 0.5 TABLET ORAL at 04:52

## 2020-11-28 RX ADMIN — LIDOCAINE 1 PATCH: 50 PATCH CUTANEOUS at 12:23

## 2020-11-28 RX ADMIN — DIVALPROEX SODIUM 125 MG: 125 CAPSULE ORAL at 04:52

## 2020-11-28 RX ADMIN — CYCLOBENZAPRINE 10 MG: 10 TABLET, FILM COATED ORAL at 23:00

## 2020-11-28 RX ADMIN — ENOXAPARIN SODIUM 40 MG: 40 INJECTION SUBCUTANEOUS at 04:55

## 2020-11-28 RX ADMIN — OXYCODONE HYDROCHLORIDE 10 MG: 10 TABLET ORAL at 04:52

## 2020-11-28 ASSESSMENT — PAIN DESCRIPTION - PAIN TYPE
TYPE: ACUTE PAIN

## 2020-11-28 NOTE — WOUND TEAM
[facility]  Inpatient Services  Wound and Skin Care Progress Note    Admission Date: 8/7/2020     Last order of IP CONSULT TO WOUND CARE was found on 9/1/2020 from Hospital Encounter on 8/7/2020     HPI, PMH, SH: Reviewed    Unit where seen by Wound Team: [id number]    WOUND CONSULT/FOLLOW UP RELATED TO:  Left leg follow-up    Self Report / Pain Level: pain with debridement, topical lidocaine used    OBJECTIVE:  2 layer wrap intact, Sitter in place      WOUND TYPE, LOCATION, CHARACTERISTICS (Pressure Injuries: location, stage, POA or date identified)      Negative Pressure Wound Therapy 11/20/20 Leg Lateral;Posterior;Medial Left (Active)   NPWT Pump Mode / Pressure Setting Continuous;125 mmHg 11/27/20 1631   Dressing Type Medium;Black Foam (Regular) 11/27/20 1631   Number of Foam Pieces Used 4 11/23/20 1200   Canister Changed No 11/27/20 1631   Output (mL) 100 mL 11/25/20 0500   NEXT Dressing Change/Treatment Date 11/25/20 11/23/20 2124   WOUND NURSE ONLY - Time Spent with Patient (mins) 75 11/23/20 1200           Wound 11/19/20 Full Thickness Wound Leg Lateral;Posterior;Medial Left BIOLOGIC IN PLACE under adaptic DO NOT TOUCH ADAPTIC (Active)   Wound Image    11/27/20 1631   Site Assessment Red 11/27/20 1631   Periwound Assessment Pink;Edema 11/27/20 1631   Margins Attached edges 11/27/20 1631   Closure Secondary intention 11/27/20 1631   Drainage Amount Small 11/27/20 1631   Drainage Description Serosanguineous 11/27/20 1631   Treatments Site care;Cleansed 11/27/20 1631   Wound Cleansing Not Applicable 11/25/20 1300   Periwound Protectant Benzoin;Drape 11/27/20 1631   Dressing Cleansing/Solutions Not Applicable 11/25/20 1300   Dressing Options Wound Vac;Compression Wrap Two Layer 11/27/20 1631   Dressing Changed Changed 11/27/20 1631   Dressing Status Clean;Dry;Intact 11/27/20 0813   Dressing Change/Treatment Frequency By Wound Team Only 11/27/20 1631   NEXT Dressing Change/Treatment Date 11/30/20  20 1631   NEXT Weekly Photo (Inpatient Only) 20 1631   Non-staged Wound Description Full thickness 20 1631   Shape V - linear 20 1631   Wound Odor Foul 20 1631   Pulses Left;2+;DP;PT 20 1200   Exposed Structures None 20 1631   WOUND NURSE ONLY - Time Spent with Patient (mins) 75 20 1631           VASCULAR    CESAR:   CESAR Results, Last 30 Days Us-cesar Single Level Bilat    Result Date: 2020  Narrative  Vascular Laboratory  Conclusions  1.  Normal bilateral CESAR's.  GRUPO GANN  Age:    64    Gender:     M  MRN:    [id number]  :    1955      BSA:  Exam Date:     2020 09:59  Room #:     Inpatient  Priority:     Routine  Ht (in):             Wt (lb):  Ordering Physician:        EDDA CANADA  Referring Physician:       [id number] Morrissey  Sonographer:               Deja Ellis [facility]                             RVT  Study Type:                Complete Bilateral  Technical Quality:         Adequate  Indications:     Ulceration of LE  CPT Codes:       06202  ICD Codes:       707.1  History:         Nonhealing wound of left lower extremity.  Limitations:                 RIGHT  Waveform            Systolic BPs (mmHg)                             117           Brachial  Triphasic                                Common Femoral  Triphasic                  138           Posterior Tibial  Triphasic                  132           Dorsalis Pedis                                           Peroneal                             1.18          CESAR                                           TBI                       LEFT  Waveform        Systolic BPs (mmHg)                             114           Brachial  Triphasic                                Common Femoral  Triphasic                  127           Posterior Tibial  Triphasic                  122           Dorsalis Pedis                                            Peroneal                             1.09          KOSTAS                                           TBI  Findings  Right.  Doppler waveforms of the common femoral artery are of high amplitude and  triphasic.  Doppler waveforms at the ankle are brisk and triphasic.  Ankle-brachial index is normal.  Left.  Doppler waveforms of the common femoral artery are of high amplitude and  triphasic.  Doppler waveforms at the ankle are brisk and triphasic.  Ankle-brachial index is normal.  Unable to obtained popliteal waveforms due to wound bandages.  Additional testing was not performed in accordance with lower extremity  arterial evaluation protocol.  Clover Maya  (Electronically Signed)  Final Date:      02 September 2020                   11:14    Lab Values:    Lab Results   Component Value Date/Time    WBC 6.5 11/22/2020 02:44 AM    RBC 3.54 (L) 11/22/2020 02:44 AM    HEMOGLOBIN 9.9 (L) 11/22/2020 02:44 AM    HEMATOCRIT 30.5 (L) 11/22/2020 02:44 AM    CREACTPROT 1.07 (H) 08/12/2020 01:55 PM    SEDRATEWES 85 (H) 08/07/2020 01:20 PM    HBA1C 5.7 (H) 11/09/2018 06:30 PM        Culture Results show:  Recent Results (from the past 720 hour(s))   CULTURE WOUND W/ GRAM STAIN    Collection Time: 09/01/20  2:00 PM    Specimen: Left Leg; Wound   Result Value Ref Range    Significant Indicator POS (POS)     Source WND     Site LEFT LEG     Culture Result - (A)     Gram Stain Result       Moderate Gram positive cocci.  Moderate Gram positive rods.      Culture Result (A)      Beta Hemolytic Streptococcus group A  Moderate growth      Culture Result (A)      Methicillin Resistant Staphylococcus aureus  Moderate growth      Culture Result (A)      Diphtheroids  Moderate growth  Mixed morphologies.         INTERVENTIONS BY WOUND TEAM:    Dressings removed and adaptic left in place.  addison-wound cleansed with wound cleanser and patted dry.    Addison-wound skin protected with benzoin, drape, black foam to the adaptic covered  wound bed, secured with drape.  TRAC pad placed.  NPWT resumed, no leaks noted.  Sacral mepilex to knee crease and 2 layer compression wrap to the left leg and thigh.        Interdisciplinary consultation: Patient, Bedside RN,       EVALUATION / RATIONALE FOR TREATMENT:    11/27 POD#8 wound is odorous likely related to biologic dressing.  No overt signs of infection.  Granular buds are visible through adaptic.  Edges do not appear as thick as they were prior to last sx.  Continue with current treatment.    11/23: POD#4 s/p I&D of left LE wounds and biologic placed by Dr. Olvera on 11/19.  Wound bed is flatter and raised edges scar tissue seems to be gone.   11/14 Posterior thigh aspect of wound deteriorating, will increase frequency of treatment to keep biofilm down and hopefully avoid systemic abx.  Will include thigh in compression wrap.  Will consider culture if improvement is not seen in the next few treatments.    11/10: APRN unavailable at this time.  Will re-schedule excisional debridement to next week.   11/5: Plan for debridement of scarred edges on Tues by LPS. Tendon exposed to posterior aspect of wound, behind knee. Continue with current dressing care.  10/29: Excisional debridement by Asaf ELLER of scar tissue along the proximal edge of the posterior knee and lateral thigh.  Lateral aspect of thigh is flatten.  Medial aspect of knee has thick scar tissue that was excisionally debrided by Asaf ELLER.  Fully granulated throughout the wound bed.   10/23 wound bed mostly granular, superficial in depth, progress has been slow with the wound VAC so will trial collagen product to encourage new tissue growth.    10/19: wound bed has improved in color and is fully granulated.  Meredith-wound has improved, denudation has resolved. APRN continuing with serial weekly debridements as needed.   10/16: wound friable pt now on iv abx per ID.  Indurated edges addressed with drape and foam.  Meredith wound  likely has yeast infection - addressing with silver powder crusting  10/14: patient seen with Asaf ELLER, stopping veraflo instillation.  Starting silver powder and regular wound VAC to see if it will help with bioburden.  Possible colonization of bacteria.  ID involved.   10/12: Inflammation noted to the proximal part of the wound bed.  Will continue to monitor, possible vac break on Wednesday to help addison-skin inflammation.   10/7: Excisional debridement performed by Asaf ELLER. Will need to continue selective debridement with NPWT changes, and weekly excisional debridement with APRN to flatten out the scar tissue.  IV abx therapy ended 10/5.   10/5: Lateral wound still with some slough, both wounds smaller per measurements. Medial wound with all granular tissue.  9/30: Patient to start abx therapy for 7 days course per Dr. Ellis.  Started dakin's instillation to veraflo  9/28: malodorous today, culture taken.    9/25: Odor noted, though unclear if from wound or pt, consider shower before next Vac change. Consider regular vac next change, wound granular and superficial.   9/23: Patient still awaiting guardianship for placement.   9/18: wound granulating nicely and responding well to current treatment.    9/16: Wound bed with granular tissue, edges are thick but appear better than previous assessments. . NPWT VF to encourage closure by secondary intention and manage drainage while encouraging oxygenation and granulation to the wound bed. 2 layer compression to assist in decrease of swelling and encourage blood flow to wounds. Wound team to follow up and change 3x/week.      Goals: Steady decrease in wound area and depth weekly.    NURSING PLAN OF CARE ORDERS (X):    Dressing changes: See Dressing Care orders: X  Skin care: See Skin Care orders:   Rectal tube care: See Rectal Tube Care orders:   Other orders:      WOUND TEAM PLAN OF CARE:   Dressing changes by wound team:        Follow up 3 times  weekly:                NPWT change 3 times weekly:  X,  NPWT changes 3x/ weekly with 2 layer compression wrap.   Follow up 1-2 times weekly:      Follow up Bi-Monthly:                   Follow up as needed:       Other (explain):     Anticipated discharge plans:  LTACH:        SNF/Rehab: X - patient will need ongoing wound care for LLE upon discharge - 3x/weekly           Home Health Care:           Outpatient Wound Center:            Self Care:

## 2020-11-28 NOTE — CARE PLAN
Problem: Safety  Goal: Will remain free from injury  Outcome: PROGRESSING AS EXPECTED   Patient remains free from injury, 1:1 sitter at bedside.                        Problem: Fluid Volume:  Goal: Will maintain balanced intake and output  Outcome: PROGRESSING AS EXPECTED   Patient with adequate PO fluid intake, tolerating well.

## 2020-11-29 PROCEDURE — A9270 NON-COVERED ITEM OR SERVICE: HCPCS | Performed by: NURSE PRACTITIONER

## 2020-11-29 PROCEDURE — 700102 HCHG RX REV CODE 250 W/ 637 OVERRIDE(OP): Performed by: NURSE PRACTITIONER

## 2020-11-29 PROCEDURE — 770001 HCHG ROOM/CARE - MED/SURG/GYN PRIV*

## 2020-11-29 PROCEDURE — 700101 HCHG RX REV CODE 250: Performed by: NURSE PRACTITIONER

## 2020-11-29 RX ADMIN — RISPERIDONE 0.5 MG: 0.5 TABLET ORAL at 06:02

## 2020-11-29 RX ADMIN — DIVALPROEX SODIUM 125 MG: 125 CAPSULE ORAL at 06:02

## 2020-11-29 RX ADMIN — CYCLOBENZAPRINE 10 MG: 10 TABLET, FILM COATED ORAL at 10:09

## 2020-11-29 RX ADMIN — AMLODIPINE BESYLATE 5 MG: 5 TABLET ORAL at 06:02

## 2020-11-29 RX ADMIN — DIVALPROEX SODIUM 125 MG: 125 CAPSULE ORAL at 12:39

## 2020-11-29 RX ADMIN — OXYCODONE HYDROCHLORIDE 10 MG: 10 TABLET ORAL at 06:18

## 2020-11-29 RX ADMIN — Medication 400 MG: at 06:02

## 2020-11-29 RX ADMIN — RISPERIDONE 1 MG: 1 TABLET ORAL at 16:53

## 2020-11-29 RX ADMIN — DIVALPROEX SODIUM 125 MG: 125 CAPSULE ORAL at 20:59

## 2020-11-29 RX ADMIN — CYCLOBENZAPRINE 10 MG: 10 TABLET, FILM COATED ORAL at 19:29

## 2020-11-29 RX ADMIN — GABAPENTIN 200 MG: 100 CAPSULE ORAL at 16:53

## 2020-11-29 RX ADMIN — GABAPENTIN 200 MG: 100 CAPSULE ORAL at 06:02

## 2020-11-29 RX ADMIN — Medication 400 MG: at 16:53

## 2020-11-29 RX ADMIN — OXYCODONE HYDROCHLORIDE 10 MG: 10 TABLET ORAL at 19:30

## 2020-11-29 RX ADMIN — HYDROXYZINE HYDROCHLORIDE 25 MG: 50 TABLET, FILM COATED ORAL at 19:29

## 2020-11-29 RX ADMIN — OXYCODONE HYDROCHLORIDE 10 MG: 10 TABLET ORAL at 12:38

## 2020-11-29 RX ADMIN — HYDROXYZINE HYDROCHLORIDE 25 MG: 50 TABLET, FILM COATED ORAL at 10:09

## 2020-11-29 RX ADMIN — GABAPENTIN 200 MG: 100 CAPSULE ORAL at 12:38

## 2020-11-29 RX ADMIN — LIDOCAINE 1 PATCH: 50 PATCH CUTANEOUS at 12:39

## 2020-11-29 ASSESSMENT — PAIN DESCRIPTION - PAIN TYPE
TYPE: ACUTE PAIN

## 2020-11-29 NOTE — CARE PLAN
Problem: Safety  Goal: Will remain free from injury  Outcome: PROGRESSING AS EXPECTED  Note: 1:1 sitter at bedside for safety     Problem: Discharge Barriers/Planning  Goal: Patient's continuum of care needs will be met  Outcome: PROGRESSING SLOWER THAN EXPECTED  Intervention: Collaborate with Transitional Care Team and Interdisciplinary Team to meet discharge needs  Note: Court date for guardianship 1/29/21.

## 2020-11-29 NOTE — CARE PLAN
Problem: Safety  Goal: Will remain free from falls  Outcome: PROGRESSING AS EXPECTED       Problem: Psychosocial Needs:  Goal: Level of anxiety will decrease  Outcome: PROGRESSING AS EXPECTED

## 2020-11-29 NOTE — PROGRESS NOTES
Received bedside report from day shift nurse. Patient resting in bed, call light and personal belongings within reach, bed in the low and locked position with upper side rails up. Assessment complete, vital signs stable, all needs met at this time. Hourly rounding in place, plan of care discussed with patient, patient verbalized understanding.

## 2020-11-30 PROCEDURE — 99231 SBSQ HOSP IP/OBS SF/LOW 25: CPT | Performed by: NURSE PRACTITIONER

## 2020-11-30 PROCEDURE — A9270 NON-COVERED ITEM OR SERVICE: HCPCS | Performed by: NURSE PRACTITIONER

## 2020-11-30 PROCEDURE — 97606 NEG PRS WND THER DME>50 SQCM: CPT

## 2020-11-30 PROCEDURE — 700102 HCHG RX REV CODE 250 W/ 637 OVERRIDE(OP): Performed by: NURSE PRACTITIONER

## 2020-11-30 PROCEDURE — 700101 HCHG RX REV CODE 250: Performed by: NURSE PRACTITIONER

## 2020-11-30 PROCEDURE — 700111 HCHG RX REV CODE 636 W/ 250 OVERRIDE (IP): Performed by: HOSPITALIST

## 2020-11-30 PROCEDURE — 770001 HCHG ROOM/CARE - MED/SURG/GYN PRIV*

## 2020-11-30 RX ADMIN — OXYCODONE HYDROCHLORIDE 10 MG: 10 TABLET ORAL at 12:52

## 2020-11-30 RX ADMIN — RISPERIDONE 0.5 MG: 0.5 TABLET ORAL at 06:48

## 2020-11-30 RX ADMIN — DIVALPROEX SODIUM 125 MG: 125 CAPSULE ORAL at 06:47

## 2020-11-30 RX ADMIN — OXYCODONE HYDROCHLORIDE 10 MG: 10 TABLET ORAL at 06:47

## 2020-11-30 RX ADMIN — RISPERIDONE 1 MG: 1 TABLET ORAL at 18:15

## 2020-11-30 RX ADMIN — DIVALPROEX SODIUM 125 MG: 125 CAPSULE ORAL at 14:13

## 2020-11-30 RX ADMIN — ENOXAPARIN SODIUM 40 MG: 40 INJECTION SUBCUTANEOUS at 06:48

## 2020-11-30 RX ADMIN — DIVALPROEX SODIUM 125 MG: 125 CAPSULE ORAL at 21:17

## 2020-11-30 RX ADMIN — GABAPENTIN 200 MG: 100 CAPSULE ORAL at 06:48

## 2020-11-30 RX ADMIN — CYCLOBENZAPRINE 10 MG: 10 TABLET, FILM COATED ORAL at 16:04

## 2020-11-30 RX ADMIN — OXYCODONE HYDROCHLORIDE 10 MG: 10 TABLET ORAL at 21:16

## 2020-11-30 RX ADMIN — HYDROXYZINE HYDROCHLORIDE 25 MG: 50 TABLET, FILM COATED ORAL at 06:48

## 2020-11-30 RX ADMIN — GABAPENTIN 200 MG: 100 CAPSULE ORAL at 18:16

## 2020-11-30 RX ADMIN — LIDOCAINE 1 PATCH: 50 PATCH CUTANEOUS at 12:52

## 2020-11-30 RX ADMIN — Medication 400 MG: at 06:47

## 2020-11-30 RX ADMIN — GABAPENTIN 200 MG: 100 CAPSULE ORAL at 12:52

## 2020-11-30 RX ADMIN — Medication 400 MG: at 18:16

## 2020-11-30 RX ADMIN — HYDROXYZINE HYDROCHLORIDE 25 MG: 50 TABLET, FILM COATED ORAL at 16:01

## 2020-11-30 ASSESSMENT — ENCOUNTER SYMPTOMS
VOMITING: 0
HEADACHES: 0
SHORTNESS OF BREATH: 0
NAUSEA: 0
COUGH: 0
SENSORY CHANGE: 0
SPEECH CHANGE: 0
FALLS: 0
PALPITATIONS: 0
DIZZINESS: 0
DIARRHEA: 0
CHILLS: 0
DEPRESSION: 1
FOCAL WEAKNESS: 0
ABDOMINAL PAIN: 0
NERVOUS/ANXIOUS: 1
INSOMNIA: 0
WEAKNESS: 0
FEVER: 0
CONSTIPATION: 0

## 2020-11-30 ASSESSMENT — PAIN DESCRIPTION - PAIN TYPE
TYPE: ACUTE PAIN
TYPE: ACUTE PAIN;CHRONIC PAIN
TYPE: ACUTE PAIN

## 2020-11-30 NOTE — CARE PLAN
Problem: Safety  Goal: Will remain free from falls  Outcome: PROGRESSING AS EXPECTED  1:1 safety sitter at bedside   Call light within reach. Needs attended on a timely manner. Treaded socks on when OOB. Up self with steady gait. Calls appropriately. Refused bed alarm     Problem: Infection  Goal: Will remain free from infection  Outcome: PROGRESSING AS EXPECTED   Hand hygiene advocated. Educated on measures on how to prevent infection.  Woundvac Dressing to LLE kept CDI. R    Problem: Discharge Barriers/Planning  Goal: Patient's continuum of care needs will be met  Outcome: PROGRESSING SLOWER THAN EXPECTED     Pending guardianship  Court date scheduled on 1/29/2021

## 2020-11-30 NOTE — PROGRESS NOTES
Hospital Medicine Twice Weekly Progress Note    Date of Service  11/30/2020    Chief Complaint  LLE Wound    Hospital Course  Mr. Varela is a 64-year-old male with PMH significant for homelessness, etoh use, tobacco dependence and chronic LLE wound who presented 8/7/2020 with LLE wound infection.  He was hospitalized at our facility in April and evaluated by orthopedic surgery who recommended wound care. Wound cultures at that time grew MSSA and strep. Patient reported losing his Medicaid card and having no transportation to wound clinic.  He was seen at [facility] earlier this week and prescribed ABX, however he has not filled the prescription.  US LLE negative for DVT.  Treated for sepsis with empiric ABX and wound care. He completed ABX course 9/16. He was found to have head lice and underwent treatments. Continued to have intermittent agitation so was started on risperdol, depakote and gabapentin per psych recs.  He has been deemed incapacitated to make medical decisions and is currently pending guardianship and placement. He is medically stable and will be only be evaluated 2 times weekly unless there is a clinical change. Repeat wound culture 9/28 grew Strep A and proteus. ID was consulted and recommended 5-day IV ABX with Zosyn - completed ABX 10/5. Wound care team noticed increased inflammation to the proximal part of the wound bed on 10/12.  They switched from vera flow wound VAC to regular wound VAC.  ID was consulted again on 10/14 and recommended 7-day course ABX with meropenem with anticipated stop date 10/22.  He underwent I&D left leg with AmnioFix biologic sheet placement and wound VAC placement by Dr. Olvera on 11/19/2020 .      Additionally, he was noted to have intermittent agitation so was started on risperdal, depakote, and gabapentin per psychiatric recommendations. He was deemed to be incapacitated to make medical decisions and is currently pending guardianship and placement, likely  to a group home.  Guardianship court hearing rescheduled for 1/29/2021 at 10AM.    Interval Problem Update  11/30 -no acute events overnight.  Says his pain is controlled with the oxycodone.  He says most of his pain is during the dressing changes.    11/26 -wound VAC dressing changed yesterday  -Continues to require telesitter for impulsivity.  His gait is stable, but he has been known to ambulate to the bathroom independently and forget about his wound VAC, causing it to become disconnected  -pain controlled    Consultants/Specialty  -LPS  -Infectious disease  -Psychiatry    Code Status  Full Code    Disposition  Pending guardianship.  Then will need placement.    Review of Systems  Review of Systems   Constitutional: Negative for chills, fever and malaise/fatigue.   Respiratory: Negative for cough and shortness of breath.    Cardiovascular: Positive for leg swelling. Negative for chest pain and palpitations.   Gastrointestinal: Negative for abdominal pain, constipation, diarrhea, nausea and vomiting.   Genitourinary: Negative for dysuria, frequency and urgency.   Musculoskeletal: Negative for falls.        .   Skin: Negative for itching.   Neurological: Negative for dizziness, sensory change, speech change, focal weakness, weakness and headaches.   Psychiatric/Behavioral: Positive for depression. Negative for suicidal ideas. The patient is nervous/anxious (sometimes). The patient does not have insomnia.    All other systems reviewed and are negative.       Physical Exam  Temp:  [36.4 °C (97.5 °F)-36.8 °C (98.3 °F)] 36.8 °C (98.3 °F)  Pulse:  [69-77] 73  Resp:  [16] 16  BP: (110-125)/(72-76) 113/76  SpO2:  [94 %-96 %] 95 %    Physical Exam  Vitals signs and nursing note reviewed. Exam conducted with a chaperone present.   Constitutional:       General: He is awake. He is not in acute distress.     Appearance: He is underweight. He is ill-appearing. He is not toxic-appearing or diaphoretic.   HENT:      Head:  Normocephalic and atraumatic.      Nose: Nose normal.      Mouth/Throat:      Lips: Pink.      Mouth: Mucous membranes are moist.   Eyes:      General: No scleral icterus.     Conjunctiva/sclera: Conjunctivae normal.   Neck:      Musculoskeletal: Normal range of motion.   Cardiovascular:      Rate and Rhythm: Normal rate.   Pulmonary:      Effort: Pulmonary effort is normal.   Abdominal:      General: There is no distension.      Tenderness: There is no abdominal tenderness.   Musculoskeletal:      Right lower leg: No edema.      Left lower leg: No edema.      Comments: Ambulates independently with steady gait.   Skin:     General: Skin is warm and dry.      Comments: Wound vac changed 11/25, but he has removed it.   Regular dressing in place. Very foul smelling.   Neurological:      Mental Status: He is alert.      Coordination: Coordination is intact.      Gait: Gait is intact. Gait normal.      Comments: Oriented to himself and hospital   Psychiatric:         Mood and Affect: Mood normal. Affect is labile.         Behavior: Behavior is not aggressive or combative. Behavior is cooperative.         Cognition and Memory: Cognition is impaired.         Judgment: Judgment is impulsive.      Comments:            Fluids    Intake/Output Summary (Last 24 hours) at 11/30/2020 1444  Last data filed at 11/30/2020 0900  Gross per 24 hour   Intake 720 ml   Output 0 ml   Net 720 ml       Laboratory                        Imaging  IR-US GUIDED PIV   Final Result    Ultrasound-guided PERIPHERAL IV INSERTION performed by    qualified nursing staff as above.      IR-MIDLINE CATHETER INSERTION WO GUIDANCE > AGE 3   Final Result                  Ultrasound-guided midline placement performed by qualified nursing staff    as above.          IR-US GUIDED PIV   Final Result    Ultrasound-guided PERIPHERAL IV INSERTION performed by    qualified nursing staff as above.      IR-MIDLINE CATHETER INSERTION WO GUIDANCE > AGE 3   Final Result                   Ultrasound-guided midline placement performed by qualified nursing staff    as above.          US-KOSTAS SINGLE LEVEL BILAT   Final Result      CT-HEAD W/O   Final Result      No acute intracranial abnormality      DX-TIBIA AND FIBULA LEFT   Final Result      1.  Healed distal metaphyseal fractures of the left fibula and tibia with tibial IM layla in place.      2.  No acute fracture or dislocation.      3.  No radiopaque soft tissue foreign body.      DX-FEMUR-2+ LEFT   Final Result      1.  No radiographic evidence of acute traumatic injury left femur.      2.  Unchanged cortical thickening in the posterior aspect of the distal femoral diaphysis which may represent sequela of prior injury or infection.      3.  No soft tissue foreign body identified.      US-EXTREMITY VENOUS LOWER UNILAT LEFT   Final Result      DX-CHEST-PORTABLE (1 VIEW)   Final Result      No acute cardiopulmonary abnormality.           Assessment/Plan  * Infection of wound due to methicillin resistant Staphylococcus aureus (MRSA)- (present on admission)  Assessment & Plan  -History of MRSA.  -foul smelling  -Poor compliance with OP wound care. Poor compliance with wound vac at times.   -LLE wound s/p debridement and wound vac placement on 11/19/20   -Pain management  -no evidence of acute infection hold abx for now    Encephalopathy- (present on admission)  Assessment & Plan  -Acute on chronic, likely due Wernicke's.  -Psychiatry: incapacitated to make medical decision or leave AMA   -Guardianship and placement pending.  -Avoid narcotics and hypnotics.    -Frequent reorientation  -Sleep hygiene.      Psychiatric disorder  Assessment & Plan  -Unknown/unspecified.  -Continue Risperdal and Depakote.  -Psychiatry has consulted and deemed him incapacitated to leave AMA or make medical decisions  -Pending guardianship.  Hearing scheduled January        Essential hypertension- (present on admission)  Assessment & Plan  -Well-controlled on  current regimen.    -Continue amlodipine.    Flexion contracture of knee, 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)  Assessment & Plan  -Secondary to chronic wound in that area.  -PT/OT.  -Encourage mobility.    Emphysema/COPD (HCC)  Assessment & Plan  -Chronic and stable, without acute exacerbation.    Alcohol dependence (HCC)- (present on admission)  Assessment & Plan  -History of. Abstinent since admission.    Protein malnutrition (HCC)  Assessment & Plan  -Moderate/severe.  -Body mass index is 21.41 kg/m². (Stable)  -Continue TID supplements.  -Encourage PO intake.    Tobacco dependence- (present on admission)  Assessment & Plan  -Abstinent since admission.       VTE prophylaxis: Enoxaparin    I have performed a physical exam and reviewed and updated ROS and Assessment/Plan today (11/30/20). In review of the previous note there are no changes except as documented above    I certify the patient requires continued medically necessary hospital services for the treatment of non-healing wound and cognitive disorder.  The patient will remain in the hospital for the foreseeable future.  Discharge may or may not occur in the next 20 days due to ongoing discharge delays due to having no medically acceptable discharge options.    Please note that this dictation was created using voice recognition software. I have made every reasonable attempt to correct obvious errors, but there may be errors of grammar and possibly content that I did not discover before finalizing the note.    LOUISE Khan.

## 2020-11-30 NOTE — PROGRESS NOTES
"WOUND CARE PROVIDER PROGRESS NOTE        HPI: 65-year-old male homelessness, EtOH use, tobacco dependence, chronic left lower extremity wound admitted 8/7/2020 with left lower extremity wound infection.  Patient was recently hospitalized at [facility] in April 2020, evaluated by orthopedic surgery who recommended wound care.  Wound culture grew MSSA and strep.  Patient was recently seen at [facility] prior to this admission was given a prescription for antibiotics but did not fill this.  Patient reports that he has had this wound for 8 to 10 years.  He reports that he fell and went \"ass over tea kettle\".  He reports that he would clean the wound almost daily with soap and water at the homeless shelter.  Per chart review, patient reported he developed the ulcer in May 2018 when he fell while hiking.  Patient was seen at wound care clinic in August 2018.  Patient had a  assisting him while he was living at [facility].  Wound VAC /was ordered however  had concerns with patient's compliancy and/ access to medical care.  Skilled nursing facility was contacted to have patient be admitted, however he was not accepted due to Medicaid.    Not on any blood thinners.  Patient ambulatory in [place].  Allergies reviewed.  He denies any allergies.    Surgery date: 11/19/2020 by Dr. Olvera  Procedure:1.  Irrigation and debridement of multiple compartments of the left leg with 2   separate 16 cm x 5 cm superficial ulcerations in posterior knee and calf.  2.  AmnioFix biologic sheet placement, left leg.  3.  Wound VAC placement, left leg, 16x5 + 16x5 cm.          Interval hx:   9/11/2020: pt calm cooperative. On zyvox. Seen during VAC and two layer compression wrap change. Pt tolerating VAC and wraps. Wound decreased in size.   9/18/2020: Patient denies any fevers, chills, nausea, vomiting.  He is tolerating the veraflo wound VAC and 2 layer compression wrap.  Wound is decreasing in size.  Increased " granular tissue noted, wound edges have improved however he does have rolled edges to popliteal fossa area.  Patient calm and cooperative, watching sports.  9/30/2020: Denies fevers, chills, nausea, vomiting.  Tolerating wound VAC and 2 layer compression wrap.  Refused nail care.  Wound culture positive for strep A and Proteus.  10/7/2020: completed 5 day course of zyvox. Denies fevers chills nausea vomiting.  Seen during veraflo VAC and 2 layer compression wrap change.    10/14/2020: Patient denies fevers, chills, nausea, vomiting.  Patient wound is malodorous complaining of increased pain to the wound.  Increased slough noted to wound bed despite veraflo wound VAC.  Reconsult ID.  10/16/2020: Patient seen during wound VAC change with Miranda wound care PT. patient denies fever, chills, nausea, vomiting.  Patient reports pain to wound and increase in pain with wound VAC change.  Patient has granular tissue throughout wound with mild amount of slough to posterior aspect of leg.  Malodorous with VAC removal.  Wound VAC nursing changed.  10/29/2020: Wound VAC was discontinued by wound team on 10/23/2020 due to slow progress and collagen was started.  Patient has completed 7-day antibiotic course of meropenem for multidrug resistant Proteus vulgaris.  Patient found to have lice and has been treated.  Nonfoul-smelling odor present with dressing removal.  11/5/2020: Seen with wound team during 2 layer compression dressing change and for excisional debridement.  No odor noted today.  Thick layer of biofilm noted.  Wound edges continue to improve but still remain to medial aspect of wound.  11/17/2020: Seen with wound team during dressing and 2 layer compression wrap change.  Patient with severely thick scar tissue to wound edges.  Excisional debridement with injectable lidocaine and epinephrine performed today.  Patient denies any fevers, chills, nausea, vomiting.  11/23/2020: POD #4 SP left leg I&D with biologic and VAC  placement.  VAC functioning.  Foul odor coming from wound.  11/30/2020: POD #11.  Patient tired of walking around with wound VAC machine.  But agreeable to continue with wound VAC.  Mild odor coming from wound with wound VAC removal.      Results:  Covid screen not detected 11/17/2020  CBC 11/22/2020: WBC 6.5, hemoglobin and hematocrit 9.9/30.5.  Platelet count 403.  CBC with differential 10/14/2020: WBC 6, H&H 11/33.9  Wound culture: 9/28/2020: Positive for beta-hemolytic strep group A, Proteus.    Wound cx: 9/1/2020 mrsa, diphtheroids, beta hemolytic strep group A    8/7/2020: X-ray tib-fib left:  1.  Healed distal metaphyseal fractures of the left fibula and tibia with tibial IM layla in place.     2.  No acute fracture or dislocation.     3.  No radiopaque soft tissue foreign body.      Arterial studies:   9/2/2020  Right.    Doppler waveforms of the common femoral artery are of high amplitude and    triphasic.    Doppler waveforms at the ankle are brisk and triphasic.    Ankle-brachial index is normal.       Left.    Doppler waveforms of the common femoral artery are of high amplitude and    triphasic.    Doppler waveforms at the ankle are brisk and triphasic.    Ankle-brachial index is normal.    Unable to obtained popliteal waveforms due to wound bandages.          Exam:    Palpable PT pulse to left foot +1 foot   +2 DP left foot  Difficulty palpating popliteal due to scar tissue  Able to extend left leg to 160 degrees. Able to flex left leg around 80 degrees      Left lower leg full-thickness ulcer  Mild foul odor.  Improved from last week.  1 large wound has  into 2 wounds with skin bridge at popliteal fossa    Lateral ulcer:  Wound edges remain open   Granular tissue noted, 100% with scattered residual skin substitute/slough  Amnio fix skin substitute appeared to have been incorporated  No periwound erythema  Edema controlled    Medial ulcer:  Decreased drainage extending to distal periwound.   Periskin red from drainage, thin with slight fissure---> hydrocolloid and Paste ring applied to protect periskin Distal aspect.  Wound edges remain open except for edges at 12:00  100% granular tissue with scattered residual skin substitute/slough  Amnio fix appears to be incorporated  Edema controlled        Photos  left leg lateral posterior view  measurement: 20.4 x 3.6 x 0.1 cm               Medial view left leg  Measurements 19.5 x 4.5 by 0.2centimeters                  ASSESSMENT/PLAN:  65-year-old male with homelessness, EtOH use, tobacco use, and chronic left leg ulcer.    POD #11 SP left leg I&D with amnio fix and wound VAC placement by Dr. Olvera  Wound edges remain open.  100% granular tissue with residual skin substitute/slough.  Mild foul odor.  Improved from last week.  No significant change to wound length however wound width has decreased.  Drainage has decreased to medial wound.  Continue with wound VAC.  Hold off on ACell application at this time.  Will discuss possibility of silver wound VAC foam versus Vanessa to control odor and promote wound healing.  Message left for Nathalia Misael [employer] rep.   Will continue to monitor and continue dressing frequency at 3 times per week.        PLAN  -Continue 3 times a week dressing changes.  This will better control drainage and odor.-Continue to monitor for signs and symptoms of infection.   Completed antibiotic course on 10/22/2020.   -Continue 2 layer compression wrap extending to thigh  - Patient to continue to be seen by wound care team while admitted  Patient to continue to be followed by wound care nurse practitioner       Discharge plan:  Pending guardianship      D/W: Patient, RN, Sera Simons wound care RN, Dr. Olvera

## 2020-11-30 NOTE — WOUND TEAM
[facility]  Inpatient Services  Wound and Skin Care Progress Note    Admission Date: 8/7/2020     Last order of IP CONSULT TO WOUND CARE was found on 9/1/2020 from Hospital Encounter on 8/7/2020     HPI, PMH, SH: Reviewed    Unit where seen by Wound Team: [id number]    WOUND CONSULT/FOLLOW UP RELATED TO:  Left leg follow-up    Self Report / Pain Level: pain with debridement, topical lidocaine used    OBJECTIVE:  2 layer wrap intact, Sitter in place      WOUND TYPE, LOCATION, CHARACTERISTICS (Pressure Injuries: location, stage, POA or date identified)       Negative Pressure Wound Therapy 11/20/20 Leg Lateral;Posterior;Medial Left (Active)   NPWT Pump Mode / Pressure Setting Ulta;Continuous;125 mmHg 11/30/20 0900   Dressing Type Medium;Black Foam (Regular) 11/30/20 0900   Number of Foam Pieces Used 4 11/30/20 0900   Canister Changed No 11/30/20 0900   Output (mL) 100 mL 11/25/20 0500   NEXT Dressing Change/Treatment Date 12/02/20 11/30/20 0900   WOUND NURSE ONLY - Time Spent with Patient (mins) 120 11/30/20 0900           Wound 11/19/20 Full Thickness Wound Leg Lateral;Posterior;Medial Left BIOLOGIC IN PLACE under adaptic DO NOT TOUCH ADAPTIC (Active)   Wound Image    11/30/20 0900   Site Assessment Red 11/30/20 0900   Periwound Assessment Clean;Dry;Intact;Scar tissue 11/30/20 0900   Margins Defined edges 11/30/20 0900   Closure Secondary intention 11/30/20 0900   Drainage Amount Moderate 11/30/20 0900   Drainage Description Serosanguineous 11/30/20 0900   Treatments Cleansed;Irrigation;Site care;Compression 11/30/20 0900   Wound Cleansing Normal Saline Irrigation 11/30/20 0900   Periwound Protectant Benzoin;Drape;Hydrocolloid;Paste Ring 11/30/20 0900   Dressing Cleansing/Solutions Not Applicable 11/30/20 0900   Dressing Options Wound Vac;Compression Wrap Two Layer 11/30/20 0900   Dressing Changed Changed 11/30/20 0900   Dressing Status Clean;Dry;Intact 11/30/20 0900   Dressing Change/Treatment Frequency By  Wound Team Only 20   NEXT Dressing Change/Treatment Date 20 09   NEXT Weekly Photo (Inpatient Only) 20 09   Non-staged Wound Description Full thickness 20   Shape V - linear 20 1631   Wound Odor Foul 20   Pulses Left;2+;DP;PT 20   Exposed Structures None 20   WOUND NURSE ONLY - Time Spent with Patient (mins) 120 20 09              VASCULAR    CESAR:   CESAR Results, Last 30 Days Us-cesar Single Level Bilat    Result Date: 2020  Narrative  Vascular Laboratory  Conclusions  1.  Normal bilateral CESAR's.  GRUPO GANN  Age:    64    Gender:     M  MRN:    [id number]  :    1955      BSA:  Exam Date:     2020 09:59  Room #:     Inpatient  Priority:     Routine  Ht (in):             Wt (lb):  Ordering Physician:        EDDA CANADA  Referring Physician:       [id number] Morrissey  Sonographer:               Deja Ellis RDMS                             RVT  Study Type:                Complete Bilateral  Technical Quality:         Adequate  Indications:     Ulceration of LE  CPT Codes:       76026  ICD Codes:       707.1  History:         Nonhealing wound of left lower extremity.  Limitations:                 RIGHT  Waveform            Systolic BPs (mmHg)                             117           Brachial  Triphasic                                Common Femoral  Triphasic                  138           Posterior Tibial  Triphasic                  132           Dorsalis Pedis                                           Peroneal                             1.18          CESAR                                           TBI                       LEFT  Waveform        Systolic BPs (mmHg)                             114           Brachial  Triphasic                                Common Femoral  Triphasic                  127           Posterior Tibial   Triphasic                  122           Dorsalis Pedis                                           Peroneal                             1.09          KOSTAS                                           TBI  Findings  Right.  Doppler waveforms of the common femoral artery are of high amplitude and  triphasic.  Doppler waveforms at the ankle are brisk and triphasic.  Ankle-brachial index is normal.  Left.  Doppler waveforms of the common femoral artery are of high amplitude and  triphasic.  Doppler waveforms at the ankle are brisk and triphasic.  Ankle-brachial index is normal.  Unable to obtained popliteal waveforms due to wound bandages.  Additional testing was not performed in accordance with lower extremity  arterial evaluation protocol.  Clover Maya  (Electronically Signed)  Final Date:      02 September 2020                   11:14    Lab Values:    Lab Results   Component Value Date/Time    WBC 6.5 11/22/2020 02:44 AM    RBC 3.54 (L) 11/22/2020 02:44 AM    HEMOGLOBIN 9.9 (L) 11/22/2020 02:44 AM    HEMATOCRIT 30.5 (L) 11/22/2020 02:44 AM    CREACTPROT 1.07 (H) 08/12/2020 01:55 PM    SEDRATEWES 85 (H) 08/07/2020 01:20 PM    HBA1C 5.7 (H) 11/09/2018 06:30 PM        Culture Results show:  Recent Results (from the past 720 hour(s))   CULTURE WOUND W/ GRAM STAIN    Collection Time: 09/01/20  2:00 PM    Specimen: Left Leg; Wound   Result Value Ref Range    Significant Indicator POS (POS)     Source WND     Site LEFT LEG     Culture Result - (A)     Gram Stain Result       Moderate Gram positive cocci.  Moderate Gram positive rods.      Culture Result (A)      Beta Hemolytic Streptococcus group A  Moderate growth      Culture Result (A)      Methicillin Resistant Staphylococcus aureus  Moderate growth      Culture Result (A)      Diphtheroids  Moderate growth  Mixed morphologies.         INTERVENTIONS BY WOUND TEAM:    Patient seen with Asaf SOLOMON APRN.  Dressings removed and adaptic removed, cleansed wound bed  with normal saline.  Meredith-wound cleansed with wound cleanser and patted dry.    Meredith-wound skin protected with benzoin and drape.  Hydrocolloid to protect the lateral portion of the knee. 4 pieces of black foam to wound bed, secured with drape.  TRAC pad placed.  NPWT resumed, no leaks noted.  Sacral mepilex to knee crease and 2 layer compression wrap to the left leg and thigh.         Interdisciplinary consultation: Patient, Bedside RN, Asaf SOLOMON      EVALUATION / RATIONALE FOR TREATMENT:    11/30 POD#11 granular buds visible to wound bed.  Same as prior.   11/27 POD#8 wound is odorous likely related to biologic dressing.  No overt signs of infection.  Granular buds are visible through adaptic.  Edges do not appear as thick as they were prior to last sx.  Continue with current treatment.    11/23: POD#4 s/p I&D of left LE wounds and biologic placed by Dr. Olvera on 11/19.  Wound bed is flatter and raised edges scar tissue seems to be gone.   11/14 Posterior thigh aspect of wound deteriorating, will increase frequency of treatment to keep biofilm down and hopefully avoid systemic abx.  Will include thigh in compression wrap.  Will consider culture if improvement is not seen in the next few treatments.    11/10: APRN unavailable at this time.  Will re-schedule excisional debridement to next week.   11/5: Plan for debridement of scarred edges on Tues by LPS. Tendon exposed to posterior aspect of wound, behind knee. Continue with current dressing care.  10/29: Excisional debridement by Asaf ELLER of scar tissue along the proximal edge of the posterior knee and lateral thigh.  Lateral aspect of thigh is flatten.  Medial aspect of knee has thick scar tissue that was excisionally debrided by Asaf ELLER.  Fully granulated throughout the wound bed.   10/23 wound bed mostly granular, superficial in depth, progress has been slow with the wound VAC so will trial collagen product to encourage new  tissue growth.    10/19: wound bed has improved in color and is fully granulated.  Addison-wound has improved, denudation has resolved. APRN continuing with serial weekly debridements as needed.   10/16: wound friable pt now on iv abx per ID.  Indurated edges addressed with drape and foam.  Addison wound likely has yeast infection - addressing with silver powder crusting  10/14: patient seen with Asaf ELLER, stopping veraflo instillation.  Starting silver powder and regular wound VAC to see if it will help with bioburden.  Possible colonization of bacteria.  ID involved.   10/12: Inflammation noted to the proximal part of the wound bed.  Will continue to monitor, possible vac break on Wednesday to help addison-skin inflammation.   10/7: Excisional debridement performed by Asaf ELLER. Will need to continue selective debridement with NPWT changes, and weekly excisional debridement with APRN to flatten out the scar tissue.  IV abx therapy ended 10/5.   10/5: Lateral wound still with some slough, both wounds smaller per measurements. Medial wound with all granular tissue.  9/30: Patient to start abx therapy for 7 days course per Dr. Ellis.  Started dakin's instillation to veraflo  9/28: malodorous today, culture taken.    9/25: Odor noted, though unclear if from wound or pt, consider shower before next Vac change. Consider regular vac next change, wound granular and superficial.   9/23: Patient still awaiting guardianship for placement.   9/18: wound granulating nicely and responding well to current treatment.    9/16: Wound bed with granular tissue, edges are thick but appear better than previous assessments. . NPWT VF to encourage closure by secondary intention and manage drainage while encouraging oxygenation and granulation to the wound bed. 2 layer compression to assist in decrease of swelling and encourage blood flow to wounds. Wound team to follow up and change 3x/week.      Goals: Steady decrease in  wound area and depth weekly.    NURSING PLAN OF CARE ORDERS (X):    Dressing changes: See Dressing Care orders: X  Skin care: See Skin Care orders:   Rectal tube care: See Rectal Tube Care orders:   Other orders:      WOUND TEAM PLAN OF CARE:   Dressing changes by wound team:        Follow up 3 times weekly:                NPWT change 3 times weekly:  X,  NPWT changes 3x/ weekly with 2 layer compression wrap.   Follow up 1-2 times weekly:      Follow up Bi-Monthly:                   Follow up as needed:       Other (explain):     Anticipated discharge plans:  LTACH:        SNF/Rehab: X - patient will need ongoing wound care for LLE upon discharge - 3x/weekly           Home Health Care:           Outpatient Wound Center:            Self Care:

## 2020-12-01 PROCEDURE — A9270 NON-COVERED ITEM OR SERVICE: HCPCS | Performed by: NURSE PRACTITIONER

## 2020-12-01 PROCEDURE — 700102 HCHG RX REV CODE 250 W/ 637 OVERRIDE(OP): Performed by: NURSE PRACTITIONER

## 2020-12-01 PROCEDURE — 700101 HCHG RX REV CODE 250: Performed by: NURSE PRACTITIONER

## 2020-12-01 PROCEDURE — 770001 HCHG ROOM/CARE - MED/SURG/GYN PRIV*

## 2020-12-01 PROCEDURE — 700111 HCHG RX REV CODE 636 W/ 250 OVERRIDE (IP): Performed by: HOSPITALIST

## 2020-12-01 RX ADMIN — CYCLOBENZAPRINE 10 MG: 10 TABLET, FILM COATED ORAL at 20:23

## 2020-12-01 RX ADMIN — ENOXAPARIN SODIUM 40 MG: 40 INJECTION SUBCUTANEOUS at 06:04

## 2020-12-01 RX ADMIN — GABAPENTIN 200 MG: 100 CAPSULE ORAL at 17:59

## 2020-12-01 RX ADMIN — DIVALPROEX SODIUM 125 MG: 125 CAPSULE ORAL at 20:23

## 2020-12-01 RX ADMIN — Medication 400 MG: at 17:59

## 2020-12-01 RX ADMIN — LIDOCAINE 1 PATCH: 50 PATCH CUTANEOUS at 13:23

## 2020-12-01 RX ADMIN — OXYCODONE HYDROCHLORIDE 10 MG: 10 TABLET ORAL at 17:59

## 2020-12-01 RX ADMIN — OXYCODONE HYDROCHLORIDE 10 MG: 10 TABLET ORAL at 12:17

## 2020-12-01 RX ADMIN — RISPERIDONE 1 MG: 1 TABLET ORAL at 17:59

## 2020-12-01 RX ADMIN — OXYCODONE HYDROCHLORIDE 10 MG: 10 TABLET ORAL at 06:04

## 2020-12-01 RX ADMIN — GABAPENTIN 200 MG: 100 CAPSULE ORAL at 12:17

## 2020-12-01 RX ADMIN — Medication 400 MG: at 06:04

## 2020-12-01 RX ADMIN — DIVALPROEX SODIUM 125 MG: 125 CAPSULE ORAL at 06:04

## 2020-12-01 RX ADMIN — DIVALPROEX SODIUM 125 MG: 125 CAPSULE ORAL at 13:22

## 2020-12-01 RX ADMIN — AMLODIPINE BESYLATE 5 MG: 5 TABLET ORAL at 06:04

## 2020-12-01 RX ADMIN — RISPERIDONE 0.5 MG: 0.5 TABLET ORAL at 06:04

## 2020-12-01 RX ADMIN — HYDROXYZINE HYDROCHLORIDE 25 MG: 50 TABLET, FILM COATED ORAL at 20:23

## 2020-12-01 RX ADMIN — GABAPENTIN 200 MG: 100 CAPSULE ORAL at 06:04

## 2020-12-01 ASSESSMENT — PAIN DESCRIPTION - PAIN TYPE
TYPE: ACUTE PAIN
TYPE: CHRONIC PAIN

## 2020-12-02 PROCEDURE — A9270 NON-COVERED ITEM OR SERVICE: HCPCS | Performed by: NURSE PRACTITIONER

## 2020-12-02 PROCEDURE — 770001 HCHG ROOM/CARE - MED/SURG/GYN PRIV*

## 2020-12-02 PROCEDURE — 700102 HCHG RX REV CODE 250 W/ 637 OVERRIDE(OP): Performed by: NURSE PRACTITIONER

## 2020-12-02 PROCEDURE — 700101 HCHG RX REV CODE 250: Performed by: NURSE PRACTITIONER

## 2020-12-02 PROCEDURE — 97606 NEG PRS WND THER DME>50 SQCM: CPT

## 2020-12-02 PROCEDURE — 700111 HCHG RX REV CODE 636 W/ 250 OVERRIDE (IP): Performed by: HOSPITALIST

## 2020-12-02 RX ADMIN — DOCUSATE SODIUM 50 MG AND SENNOSIDES 8.6 MG 2 TABLET: 8.6; 5 TABLET, FILM COATED ORAL at 04:36

## 2020-12-02 RX ADMIN — OXYCODONE HYDROCHLORIDE 10 MG: 10 TABLET ORAL at 04:36

## 2020-12-02 RX ADMIN — OXYCODONE HYDROCHLORIDE 10 MG: 10 TABLET ORAL at 19:59

## 2020-12-02 RX ADMIN — GABAPENTIN 200 MG: 100 CAPSULE ORAL at 04:36

## 2020-12-02 RX ADMIN — AMLODIPINE BESYLATE 5 MG: 5 TABLET ORAL at 04:36

## 2020-12-02 RX ADMIN — DIVALPROEX SODIUM 125 MG: 125 CAPSULE ORAL at 04:36

## 2020-12-02 RX ADMIN — OXYCODONE HYDROCHLORIDE 10 MG: 10 TABLET ORAL at 13:04

## 2020-12-02 RX ADMIN — GABAPENTIN 200 MG: 100 CAPSULE ORAL at 18:13

## 2020-12-02 RX ADMIN — HYDROXYZINE HYDROCHLORIDE 25 MG: 50 TABLET, FILM COATED ORAL at 19:59

## 2020-12-02 RX ADMIN — DIVALPROEX SODIUM 125 MG: 125 CAPSULE ORAL at 20:00

## 2020-12-02 RX ADMIN — Medication 400 MG: at 18:12

## 2020-12-02 RX ADMIN — GABAPENTIN 200 MG: 100 CAPSULE ORAL at 13:04

## 2020-12-02 RX ADMIN — LIDOCAINE 1 PATCH: 50 PATCH CUTANEOUS at 13:04

## 2020-12-02 RX ADMIN — Medication 400 MG: at 04:36

## 2020-12-02 RX ADMIN — RISPERIDONE 1 MG: 1 TABLET ORAL at 18:12

## 2020-12-02 RX ADMIN — DIVALPROEX SODIUM 125 MG: 125 CAPSULE ORAL at 13:04

## 2020-12-02 RX ADMIN — RISPERIDONE 0.5 MG: 0.5 TABLET ORAL at 04:36

## 2020-12-02 ASSESSMENT — PAIN DESCRIPTION - PAIN TYPE: TYPE: CHRONIC PAIN

## 2020-12-02 NOTE — WOUND TEAM
[facility]  Inpatient Services  Wound and Skin Care Progress Note    Admission Date: 8/7/2020     Last order of IP CONSULT TO WOUND CARE was found on 9/1/2020 from Hospital Encounter on 8/7/2020     HPI, PMH, SH: Reviewed    Unit where seen by Wound Team: [id number]    WOUND CONSULT/FOLLOW UP RELATED TO:  Left leg follow-up    Self Report / Pain Level: achy pain, PO pain meds given by bedside RN.    OBJECTIVE:  2 layer wrap intact, patient walking in room with vac on pole.    WOUND TYPE, LOCATION, CHARACTERISTICS (Pressure Injuries: location, stage, POA or date identified)       Negative Pressure Wound Therapy 11/20/20 Leg Lateral;Posterior;Medial Left (Active)   NPWT Pump Mode / Pressure Setting Ulta;Continuous;125 mmHg 12/02/20 1500   Dressing Type Medium;Black Foam (Regular) 12/02/20 1500   Number of Foam Pieces Used 4 12/02/20 1500   Canister Changed No 12/02/20 1500   Output (mL) 100 mL 11/25/20 0500   NEXT Dressing Change/Treatment Date 12/04/20 12/02/20 1500   WOUND NURSE ONLY - Time Spent with Patient (mins) 120 11/30/20 0900           Wound 11/19/20 Full Thickness Wound Leg Lateral;Posterior;Medial Left BIOLOGIC IN PLACE under adaptic DO NOT TOUCH ADAPTIC (Active)   Wound Image    11/30/20 0900   Site Assessment Red;Fully granulated 12/02/20 1500   Periwound Assessment Clean;Dry;Intact 12/02/20 1500   Margins Attached edges;Defined edges 12/02/20 1500   Closure Secondary intention 12/02/20 1500   Drainage Amount Large 12/02/20 1500   Drainage Description Serosanguineous 12/02/20 1500   Treatments Cleansed;Irrigation;Site care 12/02/20 1500   Wound Cleansing Approved Wound Cleanser 12/02/20 1500   Periwound Protectant Benzoin;Drape 12/02/20 1500   Dressing Cleansing/Solutions Not Applicable 12/02/20 1500   Dressing Options Collagen Dressing;Wound Vac;Compression Wrap Two Layer 12/02/20 1500   Dressing Changed Changed 12/02/20 1500   Dressing Status Clean;Dry;Intact 12/02/20 1500   Dressing  Change/Treatment Frequency By Wound Team Only 20 1500   NEXT Dressing Change/Treatment Date 20 1500   NEXT Weekly Photo (Inpatient Only) 20 0900   Non-staged Wound Description Full thickness 20 1500   Shape V - linear 20 1631   Wound Odor Foul 20 1500   Pulses Left;2+;DP;PT 20 1500   Exposed Structures None 20 1500   WOUND NURSE ONLY - Time Spent with Patient (mins) 120 20 0900              VASCULAR    CESAR:   CESAR Results, Last 30 Days Us-cesar Single Level Bilat    Result Date: 2020  Narrative  Vascular Laboratory  Conclusions  1.  Normal bilateral CESAR's.  GRUPO GANN  Age:    64    Gender:     M  MRN:    [id number]  :    1955      BSA:  Exam Date:     2020 09:59  Room #:     Inpatient  Priority:     Routine  Ht (in):             Wt (lb):  Ordering Physician:        EDDA CANADA  Referring Physician:       [id number] Morrissey  Sonographer:               Deja Ellis RDMS                             RVT  Study Type:                Complete Bilateral  Technical Quality:         Adequate  Indications:     Ulceration of LE  CPT Codes:       55201  ICD Codes:       707.1  History:         Nonhealing wound of left lower extremity.  Limitations:                 RIGHT  Waveform            Systolic BPs (mmHg)                             117           Brachial  Triphasic                                Common Femoral  Triphasic                  138           Posterior Tibial  Triphasic                  132           Dorsalis Pedis                                           Peroneal                             1.18          CESAR                                           TBI                       LEFT  Waveform        Systolic BPs (mmHg)                             114           Brachial  Triphasic                                Common Femoral  Triphasic                   127           Posterior Tibial  Triphasic                  122           Dorsalis Pedis                                           Peroneal                             1.09          KOSTAS                                           TBI  Findings  Right.  Doppler waveforms of the common femoral artery are of high amplitude and  triphasic.  Doppler waveforms at the ankle are brisk and triphasic.  Ankle-brachial index is normal.  Left.  Doppler waveforms of the common femoral artery are of high amplitude and  triphasic.  Doppler waveforms at the ankle are brisk and triphasic.  Ankle-brachial index is normal.  Unable to obtained popliteal waveforms due to wound bandages.  Additional testing was not performed in accordance with lower extremity  arterial evaluation protocol.  Clover Maya  (Electronically Signed)  Final Date:      02 September 2020                   11:14    Lab Values:    Lab Results   Component Value Date/Time    WBC 6.5 11/22/2020 02:44 AM    RBC 3.54 (L) 11/22/2020 02:44 AM    HEMOGLOBIN 9.9 (L) 11/22/2020 02:44 AM    HEMATOCRIT 30.5 (L) 11/22/2020 02:44 AM    CREACTPROT 1.07 (H) 08/12/2020 01:55 PM    SEDRATEWES 85 (H) 08/07/2020 01:20 PM    HBA1C 5.7 (H) 11/09/2018 06:30 PM        Culture Results show:  Recent Results (from the past 720 hour(s))   CULTURE WOUND W/ GRAM STAIN    Collection Time: 09/01/20  2:00 PM    Specimen: Left Leg; Wound   Result Value Ref Range    Significant Indicator POS (POS)     Source WND     Site LEFT LEG     Culture Result - (A)     Gram Stain Result       Moderate Gram positive cocci.  Moderate Gram positive rods.      Culture Result (A)      Beta Hemolytic Streptococcus group A  Moderate growth      Culture Result (A)      Methicillin Resistant Staphylococcus aureus  Moderate growth      Culture Result (A)      Diphtheroids  Moderate growth  Mixed morphologies.         INTERVENTIONS BY WOUND TEAM:    Patient seen at bedside. Dressings removed cleansed wound bed with normal  saline.  Meredith-wound cleansed with wound cleanser and patted dry.    Meredith-wound skin protected with benzoin and drape.  Vanessa collagen scattered throughout wound bed.  4 pieces of black foam to wound bed, secured with drape.  TRAC pad placed.  NPWT resumed, no leaks noted.  Sacral mepilex to knee crease and 2 layer compression wrap to the left leg and thigh.         Interdisciplinary consultation: Patient, Bedside RN      EVALUATION / RATIONALE FOR TREATMENT:    11/30 POD#11 granular buds visible to wound bed.  Same as prior.   11/27 POD#8 wound is odorous likely related to biologic dressing.  No overt signs of infection.  Granular buds are visible through adaptic.  Edges do not appear as thick as they were prior to last sx.  Continue with current treatment.    11/23: POD#4 s/p I&D of left LE wounds and biologic placed by Dr. Olvera on 11/19.  Wound bed is flatter and raised edges scar tissue seems to be gone.   11/14 Posterior thigh aspect of wound deteriorating, will increase frequency of treatment to keep biofilm down and hopefully avoid systemic abx.  Will include thigh in compression wrap.  Will consider culture if improvement is not seen in the next few treatments.    11/10: APRN unavailable at this time.  Will re-schedule excisional debridement to next week.   11/5: Plan for debridement of scarred edges on Tues by LPS. Tendon exposed to posterior aspect of wound, behind knee. Continue with current dressing care.  10/29: Excisional debridement by Asaf ELLER of scar tissue along the proximal edge of the posterior knee and lateral thigh.  Lateral aspect of thigh is flatten.  Medial aspect of knee has thick scar tissue that was excisionally debrided by Asaf ELLER.  Fully granulated throughout the wound bed.   10/23 wound bed mostly granular, superficial in depth, progress has been slow with the wound VAC so will trial collagen product to encourage new tissue growth.    10/19: wound bed has  improved in color and is fully granulated.  Addison-wound has improved, denudation has resolved. APRN continuing with serial weekly debridements as needed.   10/16: wound friable pt now on iv abx per ID.  Indurated edges addressed with drape and foam.  Addison wound likely has yeast infection - addressing with silver powder crusting  10/14: patient seen with Asaf ELLER, stopping veraflo instillation.  Starting silver powder and regular wound VAC to see if it will help with bioburden.  Possible colonization of bacteria.  ID involved.   10/12: Inflammation noted to the proximal part of the wound bed.  Will continue to monitor, possible vac break on Wednesday to help addison-skin inflammation.   10/7: Excisional debridement performed by Asaf ELLER. Will need to continue selective debridement with NPWT changes, and weekly excisional debridement with APRN to flatten out the scar tissue.  IV abx therapy ended 10/5.   10/5: Lateral wound still with some slough, both wounds smaller per measurements. Medial wound with all granular tissue.  9/30: Patient to start abx therapy for 7 days course per Dr. Ellis.  Started dakin's instillation to veraflo  9/28: malodorous today, culture taken.    9/25: Odor noted, though unclear if from wound or pt, consider shower before next Vac change. Consider regular vac next change, wound granular and superficial.   9/23: Patient still awaiting guardianship for placement.   9/18: wound granulating nicely and responding well to current treatment.    9/16: Wound bed with granular tissue, edges are thick but appear better than previous assessments. . NPWT VF to encourage closure by secondary intention and manage drainage while encouraging oxygenation and granulation to the wound bed. 2 layer compression to assist in decrease of swelling and encourage blood flow to wounds. Wound team to follow up and change 3x/week.      Goals: Steady decrease in wound area and depth weekly.    NURSING  PLAN OF CARE ORDERS (X):    Dressing changes: See Dressing Care orders: X  Skin care: See Skin Care orders:   Rectal tube care: See Rectal Tube Care orders:   Other orders:      WOUND TEAM PLAN OF CARE:   Dressing changes by wound team:        Follow up 3 times weekly:                NPWT change 3 times weekly:  X,  NPWT changes 3x/ weekly with 2 layer compression wrap.   Follow up 1-2 times weekly:      Follow up Bi-Monthly:                   Follow up as needed:       Other (explain):     Anticipated discharge plans:  LTACH:        SNF/Rehab: X - patient will need ongoing wound care for LLE upon discharge - 3x/weekly           Home Health Care:           Outpatient Wound Center:            Self Care:

## 2020-12-03 PROCEDURE — A9270 NON-COVERED ITEM OR SERVICE: HCPCS | Performed by: NURSE PRACTITIONER

## 2020-12-03 PROCEDURE — 99231 SBSQ HOSP IP/OBS SF/LOW 25: CPT | Performed by: NURSE PRACTITIONER

## 2020-12-03 PROCEDURE — 700111 HCHG RX REV CODE 636 W/ 250 OVERRIDE (IP): Performed by: HOSPITALIST

## 2020-12-03 PROCEDURE — 700102 HCHG RX REV CODE 250 W/ 637 OVERRIDE(OP): Performed by: NURSE PRACTITIONER

## 2020-12-03 PROCEDURE — 770001 HCHG ROOM/CARE - MED/SURG/GYN PRIV*

## 2020-12-03 RX ADMIN — ENOXAPARIN SODIUM 40 MG: 40 INJECTION SUBCUTANEOUS at 04:20

## 2020-12-03 RX ADMIN — DIVALPROEX SODIUM 125 MG: 125 CAPSULE ORAL at 04:20

## 2020-12-03 RX ADMIN — GABAPENTIN 200 MG: 100 CAPSULE ORAL at 04:20

## 2020-12-03 RX ADMIN — OXYCODONE HYDROCHLORIDE 10 MG: 10 TABLET ORAL at 04:19

## 2020-12-03 RX ADMIN — GABAPENTIN 200 MG: 100 CAPSULE ORAL at 13:45

## 2020-12-03 RX ADMIN — RISPERIDONE 1 MG: 1 TABLET ORAL at 18:18

## 2020-12-03 RX ADMIN — GABAPENTIN 200 MG: 100 CAPSULE ORAL at 18:18

## 2020-12-03 RX ADMIN — OXYCODONE HYDROCHLORIDE 10 MG: 10 TABLET ORAL at 13:45

## 2020-12-03 RX ADMIN — DIVALPROEX SODIUM 125 MG: 125 CAPSULE ORAL at 20:18

## 2020-12-03 RX ADMIN — Medication 400 MG: at 18:18

## 2020-12-03 RX ADMIN — AMLODIPINE BESYLATE 5 MG: 5 TABLET ORAL at 04:20

## 2020-12-03 RX ADMIN — RISPERIDONE 0.5 MG: 0.5 TABLET ORAL at 04:20

## 2020-12-03 RX ADMIN — Medication 400 MG: at 04:20

## 2020-12-03 RX ADMIN — OXYCODONE HYDROCHLORIDE 10 MG: 10 TABLET ORAL at 20:18

## 2020-12-03 RX ADMIN — DIVALPROEX SODIUM 125 MG: 125 CAPSULE ORAL at 13:45

## 2020-12-03 RX ADMIN — HYDROXYZINE HYDROCHLORIDE 25 MG: 50 TABLET, FILM COATED ORAL at 18:37

## 2020-12-03 ASSESSMENT — ENCOUNTER SYMPTOMS
FOCAL WEAKNESS: 0
DIZZINESS: 0
INSOMNIA: 0
COUGH: 0
ABDOMINAL PAIN: 0
SENSORY CHANGE: 0
NAUSEA: 0
NERVOUS/ANXIOUS: 0
SHORTNESS OF BREATH: 0
HEADACHES: 0
WEAKNESS: 0
DEPRESSION: 0
PALPITATIONS: 0
FEVER: 0
DIARRHEA: 0
CONSTIPATION: 0
VOMITING: 0
SPEECH CHANGE: 0

## 2020-12-03 ASSESSMENT — PAIN DESCRIPTION - PAIN TYPE
TYPE: ACUTE PAIN

## 2020-12-03 NOTE — CARE PLAN
Problem: Safety  Goal: Will remain free from injury  Outcome: PROGRESSING AS EXPECTED   Treaded socks in place, bed in the lowest position, call light and belongings within reach, pt call for assistance appropriately Treaded socks in place, bed in the lowest position, bed alarm on, call light and belongings within reach, pt call for assistance appropriately   Problem: Discharge Barriers/Planning  Goal: Patient's continuum of care needs will be met  Outcome: PROGRESSING AS EXPECTED   Pending guardianship

## 2020-12-03 NOTE — PROGRESS NOTES
Hospital Medicine Twice Weekly Progress Note    Date of Service  12/3/2020    Chief Complaint  LLE wound    Hospital Course  Mr. Varela is a 65-year-old male who presented to the emergency department with a left lower extremity wound infection. He was hospitalized at our facility in April and evaluated by orthopedic surgery who recommended wound care. Wound cultures at that time grew MSSA and Streptococcus. He had been seen at [facility] earlier that week and prescribed antibiotics, however he had not filled the prescription.  An ultrasound of that extremity was done and was negative for DVT.  He was treated for sepsis and completed an antibiotic course on 9/16/2020. He was also found to have head lice and underwent treatment for that.  A repeat wound culture was done on 9/28/2020 and grew Strep A and Proteus. Infectious disease was consulted and recommended a 5-day course of IV zosyn which was completed on 10/5/2020. On 10/12/2020 the wound care team noticed increased inflammation to the proximal part of the wound bed and switched from a vera flow wound VAC to a regular wound VAC.  ID was again consulted and on 10/14/2020 recommended to 7-day course of antibiotics with meropenem which was completed on 10/22/2020. Additionally, he was noted to have intermittent agitation so was started on risperdal, depakote, and gabapentin per psychiatric recommendations.  On 11/20/2020 he underwent left lower extremity debridement with wound VAC placement.  Since then he has remained stable.  He has been deemed incapacitated to make medical decisions and is currently pending guardianship (which he is contesting) and placement, likely to a group home.     Interval Problem Update  Irritated by the wound VAC, but is walking around his room with it next to him and not forgetting it is there.  Pleasant and cooperative.  Currently denies pain.    Last lab work done on 11/22/2020 was stable.    Afebrile, HR 70s-100, SBP  100s-130s, O2 saturations within normal limits on room air.    Consultants/Specialty  LPS  Infectious disease  Psychiatry    Code Status  Full Code    Disposition  Pending guardianship (which he is contesting), then will likely pursue group home placement.    Court date is now scheduled for 1/29/2021 at 10 AM.    Review of Systems  Review of Systems   Constitutional: Negative for fever and malaise/fatigue.   Respiratory: Negative for cough and shortness of breath.    Cardiovascular: Negative for chest pain, palpitations and leg swelling.   Gastrointestinal: Negative for abdominal pain, constipation, diarrhea, nausea and vomiting.   Genitourinary: Negative for dysuria, frequency and urgency.   Skin:        Left lower extremity wound with wound VAC present   Neurological: Negative for dizziness, sensory change, speech change, focal weakness, weakness and headaches.   Psychiatric/Behavioral: Negative for depression. The patient is not nervous/anxious and does not have insomnia.    All other systems reviewed and are negative.     Physical Exam  Temp:  [36.4 °C (97.5 °F)-36.7 °C (98.1 °F)] 36.4 °C (97.5 °F)  Pulse:  [] 100  Resp:  [17-18] 17  BP: (123-132)/(71-83) 123/83  SpO2:  [95 %-98 %] 98 %    Physical Exam  Vitals signs and nursing note reviewed.   Constitutional:       General: He is awake. He is not in acute distress.     Appearance: Normal appearance. He is well-developed. He is not ill-appearing.   HENT:      Head: Normocephalic and atraumatic.      Mouth/Throat:      Lips: Pink.      Mouth: Mucous membranes are moist.   Eyes:      Conjunctiva/sclera: Conjunctivae normal.      Pupils: Pupils are equal, round, and reactive to light.   Neck:      Musculoskeletal: Normal range of motion and neck supple.   Cardiovascular:      Rate and Rhythm: Normal rate and regular rhythm.      Pulses: Normal pulses.      Heart sounds: Normal heart sounds.   Pulmonary:      Effort: Pulmonary effort is normal.      Breath  sounds: Normal breath sounds.   Abdominal:      General: Bowel sounds are normal. There is no distension or abdominal bruit.      Palpations: Abdomen is soft.      Tenderness: There is no abdominal tenderness.   Musculoskeletal:      Right lower leg: No edema.      Left lower leg: No edema.   Skin:     General: Skin is warm and dry.      Findings: Wound present.          Neurological:      General: No focal deficit present.      Mental Status: He is alert and oriented to person, place, and time.      GCS: GCS eye subscore is 4. GCS verbal subscore is 5. GCS motor subscore is 6.      Gait: Gait normal.   Psychiatric:         Attention and Perception: Attention and perception normal.         Mood and Affect: Mood and affect normal.         Speech: Speech normal.         Behavior: Behavior normal. Behavior is cooperative.         Thought Content: Thought content normal.         Cognition and Memory: Cognition normal. He exhibits impaired recent memory.         Judgment: Judgment normal.     Fluids  No intake or output data in the 24 hours ending 12/03/20 1410    Laboratory    Imaging  IR-US GUIDED PIV   Final Result    Ultrasound-guided PERIPHERAL IV INSERTION performed by    qualified nursing staff as above.      IR-MIDLINE CATHETER INSERTION WO GUIDANCE > AGE 3   Final Result                  Ultrasound-guided midline placement performed by qualified nursing staff    as above.          IR-US GUIDED PIV   Final Result    Ultrasound-guided PERIPHERAL IV INSERTION performed by    qualified nursing staff as above.      IR-MIDLINE CATHETER INSERTION WO GUIDANCE > AGE 3   Final Result                  Ultrasound-guided midline placement performed by qualified nursing staff    as above.          US-KOSTAS SINGLE LEVEL BILAT   Final Result      CT-HEAD W/O   Final Result      No acute intracranial abnormality      DX-TIBIA AND FIBULA LEFT   Final Result      1.  Healed distal metaphyseal fractures of the left fibula and tibia  with tibial IM layla in place.      2.  No acute fracture or dislocation.      3.  No radiopaque soft tissue foreign body.      DX-FEMUR-2+ LEFT   Final Result      1.  No radiographic evidence of acute traumatic injury left femur.      2.  Unchanged cortical thickening in the posterior aspect of the distal femoral diaphysis which may represent sequela of prior injury or infection.      3.  No soft tissue foreign body identified.      US-EXTREMITY VENOUS LOWER UNILAT LEFT   Final Result      DX-CHEST-PORTABLE (1 VIEW)   Final Result      No acute cardiopulmonary abnormality.         Assessment/Plan  * Infection of wound due to methicillin resistant Staphylococcus aureus (MRSA)- (present on admission)  Assessment & Plan  -History of MRSA.  -Poor compliance with OP wound care. Poor compliance with wound vac at times.   -LLE wound s/p debridement and wound vac placement on 11/20/2020.  -Continue pain control as needed.  -LPS continues to follow.    Encephalopathy- (present on admission)  Assessment & Plan  -Acute on chronic, likely due Wernicke's.  -Per psychiatry and Dr. Velázquez: Patient is incapacitated.  -Guardianship and placement pending.  Patient is contesting guardianship, hearing is now scheduled for 1/29/2021 at 10 AM.  -Frequent reorientation  -Sleep hygiene.    Psychiatric disorder- (present on admission)  Assessment & Plan  -Unspecified.  -Continue Risperdal and Depakote.  -Psychiatry has consulted and deemed him incapacitated to leave AMA or make medical decisions  -Pending guardianship, which she is contesting.    Essential hypertension- (present on admission)  Assessment & Plan  -Well-controlled on current regimen.    -Continue amlodipine.    Flexion contracture of knee, 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)  Assessment & Plan  -Secondary to chronic wound in that area.  -PT/OT.  -Does not seem to limit his mobility.  Is frequently ambulatory.    Emphysema/COPD (HCC)- (present on admission)  Assessment & Plan  -Chronic  and stable, without acute exacerbation.    Alcohol dependence (HCC)- (present on admission)  Assessment & Plan  -History of. Abstinent since admission.    Protein malnutrition (HCC)- (present on admission)  Assessment & Plan  -Body mass index is 21.41 kg/m².   -Continue TID supplements.  -Encourage PO intake.    Tobacco dependence- (present on admission)  Assessment & Plan  -Abstinent since admission.     VTE prophylaxis: Frequently ambulatory      Cassia Ohara, MSN, RN, APRN, ACNPC-AG, CCRN  Nurse Practitioner, [facility]  [phone]    12/3/2020    2:10 PM

## 2020-12-04 PROCEDURE — 700102 HCHG RX REV CODE 250 W/ 637 OVERRIDE(OP): Performed by: NURSE PRACTITIONER

## 2020-12-04 PROCEDURE — A9270 NON-COVERED ITEM OR SERVICE: HCPCS | Performed by: NURSE PRACTITIONER

## 2020-12-04 PROCEDURE — 97606 NEG PRS WND THER DME>50 SQCM: CPT

## 2020-12-04 PROCEDURE — 700101 HCHG RX REV CODE 250: Performed by: NURSE PRACTITIONER

## 2020-12-04 PROCEDURE — 770001 HCHG ROOM/CARE - MED/SURG/GYN PRIV*

## 2020-12-04 PROCEDURE — 700111 HCHG RX REV CODE 636 W/ 250 OVERRIDE (IP): Performed by: HOSPITALIST

## 2020-12-04 RX ORDER — NYSTATIN 100000 [USP'U]/G
POWDER TOPICAL
Status: DISPENSED | OUTPATIENT
Start: 2020-12-07 | End: 2020-12-14

## 2020-12-04 RX ADMIN — AMLODIPINE BESYLATE 5 MG: 5 TABLET ORAL at 05:15

## 2020-12-04 RX ADMIN — RISPERIDONE 1 MG: 1 TABLET ORAL at 17:01

## 2020-12-04 RX ADMIN — GABAPENTIN 200 MG: 100 CAPSULE ORAL at 16:56

## 2020-12-04 RX ADMIN — OXYCODONE HYDROCHLORIDE 10 MG: 10 TABLET ORAL at 22:55

## 2020-12-04 RX ADMIN — LIDOCAINE 1 PATCH: 50 PATCH CUTANEOUS at 10:05

## 2020-12-04 RX ADMIN — OXYCODONE HYDROCHLORIDE 10 MG: 10 TABLET ORAL at 16:57

## 2020-12-04 RX ADMIN — ENOXAPARIN SODIUM 40 MG: 40 INJECTION SUBCUTANEOUS at 05:16

## 2020-12-04 RX ADMIN — GABAPENTIN 200 MG: 100 CAPSULE ORAL at 10:05

## 2020-12-04 RX ADMIN — DIVALPROEX SODIUM 125 MG: 125 CAPSULE ORAL at 05:16

## 2020-12-04 RX ADMIN — OXYCODONE HYDROCHLORIDE 10 MG: 10 TABLET ORAL at 05:15

## 2020-12-04 RX ADMIN — OXYCODONE HYDROCHLORIDE 10 MG: 10 TABLET ORAL at 09:32

## 2020-12-04 RX ADMIN — DIVALPROEX SODIUM 125 MG: 125 CAPSULE ORAL at 13:37

## 2020-12-04 RX ADMIN — DIVALPROEX SODIUM 125 MG: 125 CAPSULE ORAL at 20:06

## 2020-12-04 RX ADMIN — Medication 400 MG: at 05:16

## 2020-12-04 RX ADMIN — RISPERIDONE 0.5 MG: 0.5 TABLET ORAL at 05:16

## 2020-12-04 RX ADMIN — HYDROXYZINE HYDROCHLORIDE 25 MG: 50 TABLET, FILM COATED ORAL at 20:06

## 2020-12-04 RX ADMIN — Medication 400 MG: at 16:56

## 2020-12-04 RX ADMIN — GABAPENTIN 200 MG: 100 CAPSULE ORAL at 05:16

## 2020-12-04 ASSESSMENT — PAIN DESCRIPTION - PAIN TYPE
TYPE: SURGICAL PAIN
TYPE: ACUTE PAIN

## 2020-12-04 NOTE — WOUND TEAM
[facility]  Inpatient Services  Wound and Skin Care Progress Note    Admission Date: 8/7/2020     Last order of IP CONSULT TO WOUND CARE was found on 9/1/2020 from Hospital Encounter on 8/7/2020     HPI, PMH, SH: Reviewed    Unit where seen by Wound Team: [id number]    WOUND CONSULT/FOLLOW UP RELATED TO:  Left leg follow-up    Self Report / Pain Level: pt slept through most of wound care  OBJECTIVE:  2 layer wrap intact, patient walking in room with vac on pole.    WOUND TYPE, LOCATION, CHARACTERISTICS (Pressure Injuries: location, stage, POA or date identified)        Negative Pressure Wound Therapy 11/20/20 Leg Lateral;Posterior;Medial Left (Active)   NPWT Pump Mode / Pressure Setting Ulta;Continuous;125 mmHg 12/04/20 1300   Dressing Type Medium;Black Foam (Regular) 12/04/20 1300   Number of Foam Pieces Used 4 12/02/20 1500   Canister Changed No 12/04/20 1300   Output (mL) 100 mL 11/25/20 0500   NEXT Dressing Change/Treatment Date 12/04/20 12/02/20 1500   WOUND NURSE ONLY - Time Spent with Patient (mins) 120 11/30/20 0900           Wound 11/19/20 Full Thickness Wound Leg Lateral;Posterior;Medial Left BIOLOGIC IN PLACE under adaptic DO NOT TOUCH ADAPTIC (Active)   Wound Image    12/04/20 1035   Site Assessment Red;Yellow 12/04/20 1300   Periwound Assessment Scar tissue 12/04/20 1300   Margins Attached edges;Defined edges 12/04/20 1300   Closure Secondary intention 12/04/20 1300   Drainage Amount Small 12/04/20 1300   Drainage Description Serosanguineous 12/04/20 1300   Treatments Site care;Cleansed 12/04/20 1300   Wound Cleansing Approved Wound Cleanser 12/04/20 1300   Periwound Protectant Benzoin;Drape 12/04/20 1300   Dressing Cleansing/Solutions Not Applicable 12/04/20 1300   Dressing Options Compression Wrap Two Layer;Wound Vac 12/04/20 1300   Dressing Changed Changed 12/04/20 1300   Dressing Status Clean;Dry;Intact 12/04/20 1300   Dressing Change/Treatment Frequency By Wound Team Only 12/04/20 1300    NEXT Dressing Change/Treatment Date 20 1300   NEXT Weekly Photo (Inpatient Only) 20 1300   Non-staged Wound Description Full thickness 20 1300   Wound Bed Granulation (%) 70 % 20 1300   Wound Bed Slough (%) 30 % 20 1300   Shape V - linear 20 1631   Wound Odor Foul 20 1300   Pulses Left;2+;DP;PT 20 1500   Exposed Structures None 20 1500   WOUND NURSE ONLY - Time Spent with Patient (mins) 120 20 0900            VASCULAR    CESAR:   CESAR Results, Last 30 Days Us-cesar Single Level Bilat    Result Date: 2020  Narrative  Vascular Laboratory  Conclusions  1.  Normal bilateral CESAR's.  GRUPO GANN  Age:    64    Gender:     M  MRN:    [id number]  :    1955      BSA:  Exam Date:     2020 09:59  Room #:     Inpatient  Priority:     Routine  Ht (in):             Wt (lb):  Ordering Physician:        EDDA CANADA  Referring Physician:       [id number] Morrissey  Sonographer:               [facility] Ellis [facility]  Study Type:                Complete Bilateral  Technical Quality:         Adequate  Indications:     Ulceration of LE  CPT Codes:       53619  ICD Codes:       707.1  History:         Nonhealing wound of left lower extremity.  Limitations:                 RIGHT  Waveform            Systolic BPs (mmHg)                             117           Brachial  Triphasic                                Common Femoral  Triphasic                  138           Posterior Tibial  Triphasic                  132           Dorsalis Pedis                                           Peroneal                             1.18          CESAR                                           TBI                       LEFT  Waveform        Systolic BPs (mmHg)                             114           Brachial  Triphasic                                Common Femoral   Triphasic                  127           Posterior Tibial  Triphasic                  122           Dorsalis Pedis                                           Peroneal                             1.09          KOSTAS                                           TBI  Findings  Right.  Doppler waveforms of the common femoral artery are of high amplitude and  triphasic.  Doppler waveforms at the ankle are brisk and triphasic.  Ankle-brachial index is normal.  Left.  Doppler waveforms of the common femoral artery are of high amplitude and  triphasic.  Doppler waveforms at the ankle are brisk and triphasic.  Ankle-brachial index is normal.  Unable to obtained popliteal waveforms due to wound bandages.  Additional testing was not performed in accordance with lower extremity  arterial evaluation protocol.  Clover Maya  (Electronically Signed)  Final Date:      02 September 2020                   11:14    Lab Values:    Lab Results   Component Value Date/Time    WBC 6.5 11/22/2020 02:44 AM    RBC 3.54 (L) 11/22/2020 02:44 AM    HEMOGLOBIN 9.9 (L) 11/22/2020 02:44 AM    HEMATOCRIT 30.5 (L) 11/22/2020 02:44 AM    CREACTPROT 1.07 (H) 08/12/2020 01:55 PM    SEDRATEWES 85 (H) 08/07/2020 01:20 PM    HBA1C 5.7 (H) 11/09/2018 06:30 PM        Culture Results show:  Recent Results (from the past 720 hour(s))   CULTURE WOUND W/ GRAM STAIN    Collection Time: 09/01/20  2:00 PM    Specimen: Left Leg; Wound   Result Value Ref Range    Significant Indicator POS (POS)     Source WND     Site LEFT LEG     Culture Result - (A)     Gram Stain Result       Moderate Gram positive cocci.  Moderate Gram positive rods.      Culture Result (A)      Beta Hemolytic Streptococcus group A  Moderate growth      Culture Result (A)      Methicillin Resistant Staphylococcus aureus  Moderate growth      Culture Result (A)      Diphtheroids  Moderate growth  Mixed morphologies.         INTERVENTIONS BY WOUND TEAM:    Dressings removed cleansed wound bed with  normal saline.  Meredith-wound cleansed with wound cleanser and patted dry.    Meredith-wound skin protected with benzoin and drape.  black foam to wound bed, secured with drape.  TRAC pad placed.  NPWT resumed, no leaks noted.  Fan folded foam padding behind knee crease, and 2 layer compression wrap to the left leg and thigh.         Interdisciplinary consultation: Patient, Bedside RN      EVALUATION / RATIONALE FOR TREATMENT:    12/4/2020 POD# 16 odor worsening, more yellow tissue present, will add nystatin and silver foam next week and consider a culture  11/30 POD#11 granular buds visible to wound bed.  Same as prior.   11/27 POD#8 wound is odorous likely related to biologic dressing.  No overt signs of infection.  Granular buds are visible through adaptic.  Edges do not appear as thick as they were prior to last sx.  Continue with current treatment.    11/23: POD#4 s/p I&D of left LE wounds and biologic placed by Dr. Olvera on 11/19.  Wound bed is flatter and raised edges scar tissue seems to be gone.   11/14 Posterior thigh aspect of wound deteriorating, will increase frequency of treatment to keep biofilm down and hopefully avoid systemic abx.  Will include thigh in compression wrap.  Will consider culture if improvement is not seen in the next few treatments.    11/10: APRN unavailable at this time.  Will re-schedule excisional debridement to next week.   11/5: Plan for debridement of scarred edges on Tues by LPS. Tendon exposed to posterior aspect of wound, behind knee. Continue with current dressing care.  10/29: Excisional debridement by Asaf ELLER of scar tissue along the proximal edge of the posterior knee and lateral thigh.  Lateral aspect of thigh is flatten.  Medial aspect of knee has thick scar tissue that was excisionally debrided by Asaf ELLER.  Fully granulated throughout the wound bed.   10/23 wound bed mostly granular, superficial in depth, progress has been slow with the wound VAC so  will trial collagen product to encourage new tissue growth.    10/19: wound bed has improved in color and is fully granulated.  Addison-wound has improved, denudation has resolved. APRN continuing with serial weekly debridements as needed.   10/16: wound friable pt now on iv abx per ID.  Indurated edges addressed with drape and foam.  Addison wound likely has yeast infection - addressing with silver powder crusting  10/14: patient seen with Asaf ELLER, stopping veraflo instillation.  Starting silver powder and regular wound VAC to see if it will help with bioburden.  Possible colonization of bacteria.  ID involved.   10/12: Inflammation noted to the proximal part of the wound bed.  Will continue to monitor, possible vac break on Wednesday to help addison-skin inflammation.   10/7: Excisional debridement performed by Asaf ELLER. Will need to continue selective debridement with NPWT changes, and weekly excisional debridement with APRN to flatten out the scar tissue.  IV abx therapy ended 10/5.   10/5: Lateral wound still with some slough, both wounds smaller per measurements. Medial wound with all granular tissue.  9/30: Patient to start abx therapy for 7 days course per Dr. Ellis.  Started dakin's instillation to veraflo  9/28: malodorous today, culture taken.    9/25: Odor noted, though unclear if from wound or pt, consider shower before next Vac change. Consider regular vac next change, wound granular and superficial.   9/23: Patient still awaiting guardianship for placement.   9/18: wound granulating nicely and responding well to current treatment.    9/16: Wound bed with granular tissue, edges are thick but appear better than previous assessments. . NPWT VF to encourage closure by secondary intention and manage drainage while encouraging oxygenation and granulation to the wound bed. 2 layer compression to assist in decrease of swelling and encourage blood flow to wounds. Wound team to follow up and  change 3x/week.      Goals: Steady decrease in wound area and depth weekly.    NURSING PLAN OF CARE ORDERS (X):    Dressing changes: See Dressing Care orders: X  Skin care: See Skin Care orders:   Rectal tube care: See Rectal Tube Care orders:   Other orders:      WOUND TEAM PLAN OF CARE:   Dressing changes by wound team:        Follow up 3 times weekly:                NPWT change 3 times weekly:  X,  NPWT changes 3x/ weekly with 2 layer compression wrap.   Follow up 1-2 times weekly:      Follow up Bi-Monthly:                   Follow up as needed:       Other (explain):     Anticipated discharge plans:  LTACH:        SNF/Rehab: X - patient will need ongoing wound care for LLE upon discharge - 3x/weekly           Home Health Care:           Outpatient Wound Center:            Self Care:

## 2020-12-04 NOTE — CARE PLAN
Problem: Safety  Goal: Will remain free from injury  Outcome: PROGRESSING AS EXPECTED   Hourly rounds done. Call light within reach. Needs attended on a timely manner. Treaded socks on when OOB. Up self with steady gait. Calls appropriately. Refused bed alarm   Problem: Infection  Goal: Will remain free from infection  Outcome: PROGRESSING AS EXPECTED   Hand hygiene advocated. Educated on measures on how to prevent infection.  Wound vac Dressing to LLE kept CDI.     Problem: Discharge Barriers/Planning  Goal: Patient's continuum of care needs will be met  Outcome: PROGRESSING SLOWER THAN EXPECTED   Pending guardianship

## 2020-12-05 PROCEDURE — A9270 NON-COVERED ITEM OR SERVICE: HCPCS | Performed by: NURSE PRACTITIONER

## 2020-12-05 PROCEDURE — 700102 HCHG RX REV CODE 250 W/ 637 OVERRIDE(OP): Performed by: NURSE PRACTITIONER

## 2020-12-05 PROCEDURE — 700111 HCHG RX REV CODE 636 W/ 250 OVERRIDE (IP): Performed by: HOSPITALIST

## 2020-12-05 PROCEDURE — 700101 HCHG RX REV CODE 250: Performed by: NURSE PRACTITIONER

## 2020-12-05 PROCEDURE — 770001 HCHG ROOM/CARE - MED/SURG/GYN PRIV*

## 2020-12-05 RX ADMIN — DIVALPROEX SODIUM 125 MG: 125 CAPSULE ORAL at 13:16

## 2020-12-05 RX ADMIN — OXYCODONE HYDROCHLORIDE 10 MG: 10 TABLET ORAL at 05:14

## 2020-12-05 RX ADMIN — Medication 400 MG: at 18:52

## 2020-12-05 RX ADMIN — LIDOCAINE 1 PATCH: 50 PATCH CUTANEOUS at 13:15

## 2020-12-05 RX ADMIN — Medication 400 MG: at 05:13

## 2020-12-05 RX ADMIN — DIVALPROEX SODIUM 125 MG: 125 CAPSULE ORAL at 05:14

## 2020-12-05 RX ADMIN — OXYCODONE HYDROCHLORIDE 10 MG: 10 TABLET ORAL at 16:56

## 2020-12-05 RX ADMIN — DIVALPROEX SODIUM 125 MG: 125 CAPSULE ORAL at 22:26

## 2020-12-05 RX ADMIN — ENOXAPARIN SODIUM 40 MG: 40 INJECTION SUBCUTANEOUS at 05:13

## 2020-12-05 RX ADMIN — GABAPENTIN 200 MG: 100 CAPSULE ORAL at 13:16

## 2020-12-05 RX ADMIN — CYCLOBENZAPRINE 10 MG: 10 TABLET, FILM COATED ORAL at 05:14

## 2020-12-05 RX ADMIN — GABAPENTIN 200 MG: 100 CAPSULE ORAL at 18:51

## 2020-12-05 RX ADMIN — RISPERIDONE 1 MG: 1 TABLET ORAL at 18:51

## 2020-12-05 RX ADMIN — RISPERIDONE 0.5 MG: 0.5 TABLET ORAL at 05:14

## 2020-12-05 RX ADMIN — GABAPENTIN 200 MG: 100 CAPSULE ORAL at 05:14

## 2020-12-05 ASSESSMENT — PAIN DESCRIPTION - PAIN TYPE
TYPE: ACUTE PAIN

## 2020-12-05 ASSESSMENT — FIBROSIS 4 INDEX: FIB4 SCORE: 0.74

## 2020-12-05 NOTE — CARE PLAN
Problem: Safety  Goal: Will remain free from injury  Outcome: PROGRESSING AS EXPECTED   Hourly rounds done. Call light within reach. Needs attended on a timely manner. Treaded socks on when OOB. Up self with steady gait. Calls appropriately. Refused bed alarm   Problem: Infection  Goal: Will remain free from infection  Outcome: PROGRESSING AS EXPECTED   Hand hygiene advocated. Educated on measures on how to prevent infection.  Wound vac Dressing to LLE kept CDI.     Problem: Discharge Barriers/Planning  Goal: Patient's continuum of care needs will be met  Outcome: PROGRESSING SLOWER THAN EXPECTED   Pending guardianship. Courtdate: 1/29/2021

## 2020-12-06 PROCEDURE — 700102 HCHG RX REV CODE 250 W/ 637 OVERRIDE(OP): Performed by: NURSE PRACTITIONER

## 2020-12-06 PROCEDURE — A9270 NON-COVERED ITEM OR SERVICE: HCPCS | Performed by: NURSE PRACTITIONER

## 2020-12-06 PROCEDURE — 770001 HCHG ROOM/CARE - MED/SURG/GYN PRIV*

## 2020-12-06 PROCEDURE — 700111 HCHG RX REV CODE 636 W/ 250 OVERRIDE (IP): Performed by: HOSPITALIST

## 2020-12-06 PROCEDURE — 99231 SBSQ HOSP IP/OBS SF/LOW 25: CPT | Performed by: NURSE PRACTITIONER

## 2020-12-06 RX ADMIN — CYCLOBENZAPRINE 10 MG: 10 TABLET, FILM COATED ORAL at 21:38

## 2020-12-06 RX ADMIN — RISPERIDONE 1 MG: 1 TABLET ORAL at 20:06

## 2020-12-06 RX ADMIN — Medication 400 MG: at 06:05

## 2020-12-06 RX ADMIN — DIVALPROEX SODIUM 125 MG: 125 CAPSULE ORAL at 06:04

## 2020-12-06 RX ADMIN — AMLODIPINE BESYLATE 5 MG: 5 TABLET ORAL at 06:04

## 2020-12-06 RX ADMIN — OXYCODONE HYDROCHLORIDE 10 MG: 10 TABLET ORAL at 21:38

## 2020-12-06 RX ADMIN — GABAPENTIN 200 MG: 100 CAPSULE ORAL at 06:04

## 2020-12-06 RX ADMIN — HYDROXYZINE HYDROCHLORIDE 25 MG: 50 TABLET, FILM COATED ORAL at 20:06

## 2020-12-06 RX ADMIN — GABAPENTIN 200 MG: 100 CAPSULE ORAL at 12:52

## 2020-12-06 RX ADMIN — RISPERIDONE 0.5 MG: 0.5 TABLET ORAL at 06:05

## 2020-12-06 RX ADMIN — OXYCODONE HYDROCHLORIDE 10 MG: 10 TABLET ORAL at 09:31

## 2020-12-06 RX ADMIN — GABAPENTIN 200 MG: 100 CAPSULE ORAL at 20:07

## 2020-12-06 RX ADMIN — DIVALPROEX SODIUM 125 MG: 125 CAPSULE ORAL at 12:52

## 2020-12-06 RX ADMIN — DIVALPROEX SODIUM 125 MG: 125 CAPSULE ORAL at 20:06

## 2020-12-06 RX ADMIN — ENOXAPARIN SODIUM 40 MG: 40 INJECTION SUBCUTANEOUS at 06:05

## 2020-12-06 ASSESSMENT — ENCOUNTER SYMPTOMS
NERVOUS/ANXIOUS: 0
DIARRHEA: 0
DIZZINESS: 0
CONSTIPATION: 0
NAUSEA: 0
FOCAL WEAKNESS: 0
DEPRESSION: 0
INSOMNIA: 0
FEVER: 0
SPEECH CHANGE: 0
ABDOMINAL PAIN: 0
VOMITING: 0
HEADACHES: 0
WEAKNESS: 0
SENSORY CHANGE: 0
COUGH: 0
SHORTNESS OF BREATH: 0

## 2020-12-06 ASSESSMENT — PAIN DESCRIPTION - PAIN TYPE
TYPE: ACUTE PAIN

## 2020-12-06 NOTE — CARE PLAN
Problem: Safety  Goal: Will remain free from injury  Outcome: PROGRESSING AS EXPECTED   Treaded socks in place, bed in the lowest position, call light and belongings within reach, pt call for assistance appropriately   Problem: Discharge Barriers/Planning  Goal: Patient's continuum of care needs will be met  Outcome: PROGRESSING AS EXPECTED   Pending guardianship

## 2020-12-06 NOTE — PROGRESS NOTES
Hospital Medicine Twice Weekly Progress Note    Date of Service  12/6/2020    Chief Complaint  LLE wound    Hospital Course  Mr. Varela is a 65-year-old male who presented to the emergency department with a left lower extremity wound infection. He was hospitalized at our facility in April and evaluated by orthopedic surgery who recommended wound care. Wound cultures at that time grew MSSA and Streptococcus. He had been seen at [facility] earlier that week and prescribed antibiotics, however he had not filled the prescription.  An ultrasound of that extremity was done and was negative for DVT.  He was treated for sepsis and completed an antibiotic course on 9/16/2020. He was also found to have head lice and underwent treatment for that.  A repeat wound culture was done on 9/28/2020 and grew Strep A and Proteus. Infectious disease was consulted and recommended a 5-day course of IV zosyn which was completed on 10/5/2020. On 10/12/2020 the wound care team noticed increased inflammation to the proximal part of the wound bed and switched from a vera flow wound VAC to a regular wound VAC.  ID was again consulted and on 10/14/2020 recommended to 7-day course of antibiotics with meropenem which was completed on 10/22/2020. Additionally, he was noted to have intermittent agitation so was started on risperdal, depakote, and gabapentin per psychiatric recommendations.  On 11/20/2020 he underwent left lower extremity debridement with wound VAC placement.  Since then he has remained stable.  He has been deemed incapacitated to make medical decisions and is currently pending guardianship (which he is contesting) and placement, likely to a group home.     Interval Problem Update  Sleeping, seems more down today. Pain controlled.     Afebrile, HR 70s-80s, SBP 90s-120s, O2 saturations within normal limits on room air.    Consultants/Specialty  LPS  Infectious disease  Psychiatry    Code Status  Full  Code    Disposition  Pending guardianship (which he is contesting), then will likely pursue group home placement.    Court date is now scheduled for 1/29/2021 at 10 AM.    Review of Systems  Review of Systems   Constitutional: Negative for fever and malaise/fatigue.   Respiratory: Negative for cough and shortness of breath.    Cardiovascular: Negative for chest pain and leg swelling.   Gastrointestinal: Negative for abdominal pain, constipation, diarrhea, nausea and vomiting.   Genitourinary: Negative for dysuria, frequency and urgency.   Skin:        Left lower extremity wound with wound VAC present   Neurological: Negative for dizziness, sensory change, speech change, focal weakness, weakness and headaches.   Psychiatric/Behavioral: Negative for depression. The patient is not nervous/anxious and does not have insomnia.    All other systems reviewed and are negative.     Physical Exam  Temp:  [36.1 °C (97 °F)-36.9 °C (98.4 °F)] 36.3 °C (97.3 °F)  Pulse:  [74-87] 74  Resp:  [16-17] 17  BP: (107-127)/(58-74) 127/70  SpO2:  [92 %-100 %] 97 %    Physical Exam  Vitals signs and nursing note reviewed.   Constitutional:       General: He is awake. He is not in acute distress.     Appearance: Normal appearance. He is well-developed. He is not ill-appearing.   HENT:      Head: Normocephalic and atraumatic.      Mouth/Throat:      Lips: Pink.      Mouth: Mucous membranes are moist.   Eyes:      Conjunctiva/sclera: Conjunctivae normal.      Pupils: Pupils are equal, round, and reactive to light.   Neck:      Musculoskeletal: Normal range of motion and neck supple.   Cardiovascular:      Rate and Rhythm: Normal rate and regular rhythm.      Pulses: Normal pulses.      Heart sounds: Normal heart sounds.   Pulmonary:      Effort: Pulmonary effort is normal.      Breath sounds: Normal breath sounds.   Abdominal:      General: Bowel sounds are normal. There is no distension or abdominal bruit.      Palpations: Abdomen is soft.       Tenderness: There is no abdominal tenderness.   Musculoskeletal:      Right lower leg: No edema.      Left lower leg: No edema.   Skin:     General: Skin is warm and dry.      Findings: Wound present.          Neurological:      General: No focal deficit present.      Mental Status: He is alert and oriented to person, place, and time.      GCS: GCS eye subscore is 4. GCS verbal subscore is 5. GCS motor subscore is 6.      Gait: Gait normal.   Psychiatric:         Attention and Perception: Attention and perception normal.         Mood and Affect: Affect normal. Mood is depressed.         Speech: Speech normal.         Behavior: Behavior normal. Behavior is cooperative.         Thought Content: Thought content normal.         Cognition and Memory: Cognition normal. He exhibits impaired recent memory.         Judgment: Judgment normal.     Fluids    Intake/Output Summary (Last 24 hours) at 12/6/2020 1321  Last data filed at 12/6/2020 0603  Gross per 24 hour   Intake 320 ml   Output 50 ml   Net 270 ml     Laboratory    Imaging  IR-US GUIDED PIV   Final Result    Ultrasound-guided PERIPHERAL IV INSERTION performed by    qualified nursing staff as above.      IR-MIDLINE CATHETER INSERTION WO GUIDANCE > AGE 3   Final Result                  Ultrasound-guided midline placement performed by qualified nursing staff    as above.          IR-US GUIDED PIV   Final Result    Ultrasound-guided PERIPHERAL IV INSERTION performed by    qualified nursing staff as above.      IR-MIDLINE CATHETER INSERTION WO GUIDANCE > AGE 3   Final Result                  Ultrasound-guided midline placement performed by qualified nursing staff    as above.          US-KOSTAS SINGLE LEVEL BILAT   Final Result      CT-HEAD W/O   Final Result      No acute intracranial abnormality      DX-TIBIA AND FIBULA LEFT   Final Result      1.  Healed distal metaphyseal fractures of the left fibula and tibia with tibial IM layla in place.      2.  No acute fracture or  dislocation.      3.  No radiopaque soft tissue foreign body.      DX-FEMUR-2+ LEFT   Final Result      1.  No radiographic evidence of acute traumatic injury left femur.      2.  Unchanged cortical thickening in the posterior aspect of the distal femoral diaphysis which may represent sequela of prior injury or infection.      3.  No soft tissue foreign body identified.      US-EXTREMITY VENOUS LOWER UNILAT LEFT   Final Result      DX-CHEST-PORTABLE (1 VIEW)   Final Result      No acute cardiopulmonary abnormality.         Assessment/Plan  * Infection of wound due to methicillin resistant Staphylococcus aureus (MRSA)- (present on admission)  Assessment & Plan  -History of MRSA.  -Poor compliance with OP wound care. Poor compliance with wound vac at times.   -LLE wound s/p debridement and wound vac placement on 11/20/2020.  -Continue pain control as needed.  -LPS continues to follow.    Encephalopathy- (present on admission)  Assessment & Plan  -Acute on chronic, likely due Wernicke's. Improved.   -Per psychiatry and Dr. Velázquez: Patient is incapacitated.  -Guardianship and placement pending.  Patient is contesting guardianship, hearing is now scheduled for 1/29/2021 at 10 AM.    Psychiatric disorder- (present on admission)  Assessment & Plan  -Unspecified.  -Continue Risperdal and Depakote.  -Psychiatry has consulted and deemed him incapacitated to leave AMA or make medical decisions  -Pending guardianship, which he is contesting.    Essential hypertension- (present on admission)  Assessment & Plan  -Well-controlled on current regimen.    -Continue amlodipine.    Flexion contracture of knee, 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)  Assessment & Plan  -Secondary to chronic wound in that area.  -PT/OT.  -Does not seem to limit his mobility.  Is frequently ambulatory.    Emphysema/COPD (HCC)- (present on admission)  Assessment & Plan  -Chronic and stable, without acute exacerbation.    Alcohol dependence (HCC)- (present on  admission)  Assessment & Plan  -History of. Abstinent since admission.    Protein malnutrition (HCC)- (present on admission)  Assessment & Plan  -Body mass index is 21.35 kg/m².   -Continue TID supplements.  -Encourage PO intake.    Tobacco dependence- (present on admission)  Assessment & Plan  -Abstinent since admission.     VTE prophylaxis: Frequently ambulatory      Cassia Ohara, MSN, RN, APRN, ACNPC-AG, CCRN  Nurse Practitioner, [facility]  [phone]    12/6/2020    1:21 PM

## 2020-12-07 PROCEDURE — A9270 NON-COVERED ITEM OR SERVICE: HCPCS | Performed by: NURSE PRACTITIONER

## 2020-12-07 PROCEDURE — 700102 HCHG RX REV CODE 250 W/ 637 OVERRIDE(OP): Performed by: NURSE PRACTITIONER

## 2020-12-07 PROCEDURE — 87070 CULTURE OTHR SPECIMN AEROBIC: CPT

## 2020-12-07 PROCEDURE — 700101 HCHG RX REV CODE 250: Performed by: NURSE PRACTITIONER

## 2020-12-07 PROCEDURE — 99231 SBSQ HOSP IP/OBS SF/LOW 25: CPT | Mod: 25 | Performed by: NURSE PRACTITIONER

## 2020-12-07 PROCEDURE — 87205 SMEAR GRAM STAIN: CPT

## 2020-12-07 PROCEDURE — 11045 DBRDMT SUBQ TISS EACH ADDL: CPT | Performed by: NURSE PRACTITIONER

## 2020-12-07 PROCEDURE — 770001 HCHG ROOM/CARE - MED/SURG/GYN PRIV*

## 2020-12-07 PROCEDURE — 97606 NEG PRS WND THER DME>50 SQCM: CPT

## 2020-12-07 PROCEDURE — 700111 HCHG RX REV CODE 636 W/ 250 OVERRIDE (IP): Performed by: HOSPITALIST

## 2020-12-07 PROCEDURE — 11042 DBRDMT SUBQ TIS 1ST 20SQCM/<: CPT | Performed by: NURSE PRACTITIONER

## 2020-12-07 RX ADMIN — ENOXAPARIN SODIUM 40 MG: 40 INJECTION SUBCUTANEOUS at 06:31

## 2020-12-07 RX ADMIN — GABAPENTIN 200 MG: 100 CAPSULE ORAL at 06:32

## 2020-12-07 RX ADMIN — DOCUSATE SODIUM 50 MG AND SENNOSIDES 8.6 MG 2 TABLET: 8.6; 5 TABLET, FILM COATED ORAL at 06:32

## 2020-12-07 RX ADMIN — NYSTATIN: 100000 POWDER TOPICAL at 12:04

## 2020-12-07 RX ADMIN — Medication 400 MG: at 18:42

## 2020-12-07 RX ADMIN — RISPERIDONE 0.5 MG: 0.5 TABLET ORAL at 06:32

## 2020-12-07 RX ADMIN — OXYCODONE HYDROCHLORIDE 10 MG: 10 TABLET ORAL at 06:32

## 2020-12-07 RX ADMIN — GABAPENTIN 200 MG: 100 CAPSULE ORAL at 18:42

## 2020-12-07 RX ADMIN — GABAPENTIN 200 MG: 100 CAPSULE ORAL at 13:33

## 2020-12-07 RX ADMIN — DIVALPROEX SODIUM 125 MG: 125 CAPSULE ORAL at 06:32

## 2020-12-07 RX ADMIN — CYCLOBENZAPRINE 10 MG: 10 TABLET, FILM COATED ORAL at 20:48

## 2020-12-07 RX ADMIN — OXYCODONE HYDROCHLORIDE 10 MG: 10 TABLET ORAL at 20:48

## 2020-12-07 RX ADMIN — OXYCODONE HYDROCHLORIDE 10 MG: 10 TABLET ORAL at 13:33

## 2020-12-07 RX ADMIN — DIVALPROEX SODIUM 125 MG: 125 CAPSULE ORAL at 13:33

## 2020-12-07 RX ADMIN — AMLODIPINE BESYLATE 5 MG: 5 TABLET ORAL at 06:32

## 2020-12-07 RX ADMIN — LIDOCAINE 1 PATCH: 50 PATCH CUTANEOUS at 13:33

## 2020-12-07 RX ADMIN — DIVALPROEX SODIUM 125 MG: 125 CAPSULE ORAL at 20:48

## 2020-12-07 RX ADMIN — RISPERIDONE 1 MG: 1 TABLET ORAL at 18:42

## 2020-12-07 RX ADMIN — Medication 400 MG: at 06:32

## 2020-12-07 ASSESSMENT — PAIN DESCRIPTION - PAIN TYPE: TYPE: ACUTE PAIN

## 2020-12-07 NOTE — WOUND TEAM
[facility]  Inpatient Services  Wound and Skin Care Progress Note    Admission Date: 8/7/2020     Last order of IP CONSULT TO WOUND CARE was found on 9/1/2020 from Hospital Encounter on 8/7/2020     HPI, PMH, SH: Reviewed    Unit where seen by Wound Team: [id number]    WOUND CONSULT/FOLLOW UP RELATED TO:  Left leg follow-up    Self Report / Pain Level: pt slept through most of wound care  OBJECTIVE:  2 layer wrap intact, patient walking in room with vac on pole.    WOUND TYPE, LOCATION, CHARACTERISTICS (Pressure Injuries: location, stage, POA or date identified)        Skin Risk/[place]   Sensory Perception: Slightly Limited  Moisture: Rarely Moist  Activity: Walks Occasionally  Mobility: No Limitations  Nutrition: Adequate  Friction and Shear: No Apparent Problem  Total Score: 20  Skin Breakdown Risk: 18-23 Minimal Risk for Skin Breakdown    Sensory Interventions  Bed Types: Pressure Redistribution Mattress (Atmosair)  Skin Preventative Measures: Pillows in Use to Float Heels, Pillows in Use for Support / Positioning  Skin Preventative Dressing: Mepilex Lite  Moisturizers/Barriers: Moisturizer   PT / OT Involved in Care: Physical Therapy Involved, Occupational Therapy Involved  Activity : Ambulated, Up to bathroom  Patient Turns / Repositioning: Patient Turns Self from Side to Side  Patient is Receiving Nutrition: Oral Intake Adequate  Nutrition Consult Ordered: No, Consult has Already been Placed  Vitamin Therapy in Use: No  Friction Interventions: Draw Sheet / Pad Used for Repositioning                Negative Pressure Wound Therapy 11/20/20 Leg Lateral;Posterior;Medial Left (Active)   NPWT Pump Mode / Pressure Setting Ulta;Continuous;125 mmHg 12/07/20 1300   Dressing Type Medium;Silver impregnated foam 12/07/20 1300   Number of Foam Pieces Used 4 12/02/20 1500   Canister Changed No 12/07/20 1300   Output (mL) 50 mL 12/06/20 0603   NEXT Dressing Change/Treatment Date 12/04/20 12/02/20 1500   WOUND NURSE  ONLY - Time Spent with Patient (mins) 120 20 0900           Wound 20 Full Thickness Wound Leg Lateral;Posterior;Medial Left (Active)   Wound Image      20 1143   Site Assessment Red;Yellow 20 1300   Periwound Assessment Scar tissue 20 1300   Margins Attached edges;Defined edges 20 1300   Closure Secondary intention 20 1300   Drainage Amount Small 20 1300   Drainage Description Serosanguineous 20 1300   Treatments Site care;Cleansed 20 1300   Wound Cleansing Approved Wound Cleanser 20 1300   Periwound Protectant Benzoin;Drape 20 1300   Dressing Cleansing/Solutions Not Applicable 20 1300   Dressing Options Compression Wrap Two Layer;Wound Vac 20 1300   Dressing Changed Changed 20 1300   Dressing Status Clean;Dry;Intact 20 1300   Dressing Change/Treatment Frequency By Wound Team Only 20 1300   NEXT Dressing Change/Treatment Date 20 1300   NEXT Weekly Photo (Inpatient Only) 20 1300   Non-staged Wound Description Full thickness 20 1300   Wound Bed Granulation (%) 60 % 20 1300   Wound Bed Slough (%) 40 % 20 1300   Wound Bed Granulation (%) - Post-Procedure 100 % 20 1300   Shape linear 20 1300   Wound Odor Foul 20 1300   Pulses Left;2+;DP;PT 20 1500   Exposed Structures None 20 1300   WOUND NURSE ONLY - Time Spent with Patient (mins) 75 20 1300      Culture taken 2020           VASCULAR    KOSTAS:   KOSTAS Results, Last 30 Days Us-kostas Single Level Bilat    Result Date: 2020  Narrative  Vascular Laboratory  Conclusions  1.  Normal bilateral KOSTAS's.  GRUPO GANN  Age:    64    Gender:     M  MRN:    [id number]  :    1955      BSA:  Exam Date:     2020 09:59  Room #:     Inpatient  Priority:     Routine  Ht (in):             Wt (lb):  Ordering Physician:        EDDA CANADA  Physician:       [id number]  Sonographer:               Deja Ellis RDMS                             RVT  Study Type:                Complete Bilateral  Technical Quality:         Adequate  Indications:     Ulceration of LE  CPT Codes:       46538  ICD Codes:       707.1  History:         Nonhealing wound of left lower extremity.  Limitations:                 RIGHT  Waveform            Systolic BPs (mmHg)                             117           Brachial  Triphasic                                Common Femoral  Triphasic                  138           Posterior Tibial  Triphasic                  132           Dorsalis Pedis                                           Peroneal                             1.18          KOSTAS                                           TBI                       LEFT  Waveform        Systolic BPs (mmHg)                             114           Brachial  Triphasic                                Common Femoral  Triphasic                  127           Posterior Tibial  Triphasic                  122           Dorsalis Pedis                                           Peroneal                             1.09          KOSTAS                                           TBI  Findings  Right.  Doppler waveforms of the common femoral artery are of high amplitude and  triphasic.  Doppler waveforms at the ankle are brisk and triphasic.  Ankle-brachial index is normal.  Left.  Doppler waveforms of the common femoral artery are of high amplitude and  triphasic.  Doppler waveforms at the ankle are brisk and triphasic.  Ankle-brachial index is normal.  Unable to obtained popliteal waveforms due to wound bandages.  Additional testing was not performed in accordance with lower extremity  arterial evaluation protocol.  Clover Maya  (Electronically Signed)  Final Date:      02 September 2020                   11:14    Lab Values:    Lab Results   Component Value  Date/Time    WBC 6.5 11/22/2020 02:44 AM    RBC 3.54 (L) 11/22/2020 02:44 AM    HEMOGLOBIN 9.9 (L) 11/22/2020 02:44 AM    HEMATOCRIT 30.5 (L) 11/22/2020 02:44 AM    CREACTPROT 1.07 (H) 08/12/2020 01:55 PM    SEDRATEWES 85 (H) 08/07/2020 01:20 PM    HBA1C 5.7 (H) 11/09/2018 06:30 PM        Culture Results show:  Recent Results (from the past 720 hour(s))   CULTURE WOUND W/ GRAM STAIN    Collection Time: 09/01/20  2:00 PM    Specimen: Left Leg; Wound   Result Value Ref Range    Significant Indicator POS (POS)     Source WND     Site LEFT LEG     Culture Result - (A)     Gram Stain Result       Moderate Gram positive cocci.  Moderate Gram positive rods.      Culture Result (A)      Beta Hemolytic Streptococcus group A  Moderate growth      Culture Result (A)      Methicillin Resistant Staphylococcus aureus  Moderate growth      Culture Result (A)      Diphtheroids  Moderate growth  Mixed morphologies.         INTERVENTIONS BY WOUND TEAM:    Dressings removed cleansed wound bed with wound cleanser.  Meredith-wound cleansed with wound cleanser and patted dry.    Asaf MORENO sharp debrided with currete.  Meredith-wound skin protected with benzoin and dermatac drape.  Nystatin to medial leg wound, silver foam to all of wound bed, secured with regular drape.  TRAC pad placed.  NPWT resumed, no leaks noted.  Fan folded foam padding behind knee crease, and 2 layer compression wrap to the left leg and thigh.         Interdisciplinary consultation: Patient, Bedside RN, Asaf MORENO      EVALUATION / RATIONALE FOR TREATMENT:    12/7/2020 POD 19 odor persists, improved wound bed after debridement.  Possible bacterial vs yeast vs fungal infection.  Culture taken.  Nystatin to treat possible fungal infection, silver foam to decrease microbial population.    12/4/2020 POD# 16 odor worsening, more yellow tissue present, will add nystatin and silver foam next week and consider a culture  11/30 POD#11 granular buds visible to wound  bed.  Same as prior.   11/27 POD#8 wound is odorous likely related to biologic dressing.  No overt signs of infection.  Granular buds are visible through adaptic.  Edges do not appear as thick as they were prior to last sx.  Continue with current treatment.    11/23: POD#4 s/p I&D of left LE wounds and biologic placed by Dr. Olvera on 11/19.  Wound bed is flatter and raised edges scar tissue seems to be gone.   11/14 Posterior thigh aspect of wound deteriorating, will increase frequency of treatment to keep biofilm down and hopefully avoid systemic abx.  Will include thigh in compression wrap.  Will consider culture if improvement is not seen in the next few treatments.    11/10: APRN unavailable at this time.  Will re-schedule excisional debridement to next week.   11/5: Plan for debridement of scarred edges on Tues by LPS. Tendon exposed to posterior aspect of wound, behind knee. Continue with current dressing care.  10/29: Excisional debridement by Asaf ELLER of scar tissue along the proximal edge of the posterior knee and lateral thigh.  Lateral aspect of thigh is flatten.  Medial aspect of knee has thick scar tissue that was excisionally debrided by Asaf ELLER.  Fully granulated throughout the wound bed.   10/23 wound bed mostly granular, superficial in depth, progress has been slow with the wound VAC so will trial collagen product to encourage new tissue growth.    10/19: wound bed has improved in color and is fully granulated.  Meredith-wound has improved, denudation has resolved. APRN continuing with serial weekly debridements as needed.   10/16: wound friable pt now on iv abx per ID.  Indurated edges addressed with drape and foam.  Meredith wound likely has yeast infection - addressing with silver powder crusting  10/14: patient seen with Asaf ELLER, stopping veraflo instillation.  Starting silver powder and regular wound VAC to see if it will help with bioburden.  Possible colonization of  bacteria.  ID involved.   10/12: Inflammation noted to the proximal part of the wound bed.  Will continue to monitor, possible vac break on Wednesday to help addison-skin inflammation.   10/7: Excisional debridement performed by Asaf ELLER. Will need to continue selective debridement with NPWT changes, and weekly excisional debridement with APRN to flatten out the scar tissue.  IV abx therapy ended 10/5.   10/5: Lateral wound still with some slough, both wounds smaller per measurements. Medial wound with all granular tissue.  9/30: Patient to start abx therapy for 7 days course per Dr. Ellis.  Started dakin's instillation to veraflo  9/28: malodorous today, culture taken.    9/25: Odor noted, though unclear if from wound or pt, consider shower before next Vac change. Consider regular vac next change, wound granular and superficial.   9/23: Patient still awaiting guardianship for placement.   9/18: wound granulating nicely and responding well to current treatment.    9/16: Wound bed with granular tissue, edges are thick but appear better than previous assessments. . NPWT VF to encourage closure by secondary intention and manage drainage while encouraging oxygenation and granulation to the wound bed. 2 layer compression to assist in decrease of swelling and encourage blood flow to wounds. Wound team to follow up and change 3x/week.      Goals: Steady decrease in wound area and depth weekly.    NURSING PLAN OF CARE ORDERS (X):    Dressing changes: See Dressing Care orders: X  Skin care: See Skin Care orders:   Rectal tube care: See Rectal Tube Care orders:   Other orders:      WOUND TEAM PLAN OF CARE:   Dressing changes by wound team:        Follow up 3 times weekly:                NPWT change 3 times weekly:  X,  NPWT changes 3x/ weekly with 2 layer compression wrap.   Follow up 1-2 times weekly:      Follow up Bi-Monthly:                   Follow up as needed:       Other (explain):     Anticipated discharge  plans:  LTACH:        SNF/Rehab: X - patient will need ongoing wound care for LLE upon discharge - 3x/weekly           Home Health Care:           Outpatient Wound Center:            Self Care:

## 2020-12-07 NOTE — PROGRESS NOTES
"WOUND CARE PROVIDER PROGRESS NOTE        HPI: 65-year-old male homelessness, EtOH use, tobacco dependence, chronic left lower extremity wound admitted 8/7/2020 with left lower extremity wound infection.  Patient was recently hospitalized at [facility] in April 2020, evaluated by orthopedic surgery who recommended wound care.  Wound culture grew MSSA and strep.  Patient was recently seen at [facility] prior to this admission was given a prescription for antibiotics but did not fill this.  Patient reports that he has had this wound for 8 to 10 years.  He reports that he fell and went \"ass over tea kettle\".  He reports that he would clean the wound almost daily with soap and water at the homeless shelter.  Per chart review, patient reported he developed the ulcer in May 2018 when he fell while hiking.  Patient was seen at wound care clinic in August 2018.  Patient had a  assisting him while he was living at [facility] housing.  Wound VAC /was ordered however  had concerns with patient's compliancy and/ access to medical care.  Skilled nursing facility was contacted to have patient be admitted, however he was not accepted due to Medicaid.    Not on any blood thinners.  Patient ambulatory in [place].  Allergies reviewed.  He denies any allergies.    Surgery date: 11/19/2020 by Dr. Olvera  Procedure:1.  Irrigation and debridement of multiple compartments of the left leg with 2   separate 16 cm x 5 cm superficial ulcerations in posterior knee and calf.  2.  AmnioFix biologic sheet placement, left leg.  3.  Wound VAC placement, left leg, 16x5 + 16x5 cm.          Interval hx:   9/11/2020: pt calm cooperative. On zyvox. Seen during VAC and two layer compression wrap change. Pt tolerating VAC and wraps. Wound decreased in size.   9/18/2020: Patient denies any fevers, chills, nausea, vomiting.  He is tolerating the veraflo wound VAC and 2 layer compression wrap.  Wound is decreasing in size.  Increased " granular tissue noted, wound edges have improved however he does have rolled edges to popliteal fossa area.  Patient calm and cooperative, watching sports.  9/30/2020: Denies fevers, chills, nausea, vomiting.  Tolerating wound VAC and 2 layer compression wrap.  Refused nail care.  Wound culture positive for strep A and Proteus.  10/7/2020: completed 5 day course of zyvox. Denies fevers chills nausea vomiting.  Seen during veraflo VAC and 2 layer compression wrap change.    10/14/2020: Patient denies fevers, chills, nausea, vomiting.  Patient wound is malodorous complaining of increased pain to the wound.  Increased slough noted to wound bed despite veraflo wound VAC.  Reconsult ID.  10/16/2020: Patient seen during wound VAC change with Miranda wound care PT. patient denies fever, chills, nausea, vomiting.  Patient reports pain to wound and increase in pain with wound VAC change.  Patient has granular tissue throughout wound with mild amount of slough to posterior aspect of leg.  Malodorous with VAC removal.  Wound VAC nursing changed.  10/29/2020: Wound VAC was discontinued by wound team on 10/23/2020 due to slow progress and collagen was started.  Patient has completed 7-day antibiotic course of meropenem for multidrug resistant Proteus vulgaris.  Patient found to have lice and has been treated.  Nonfoul-smelling odor present with dressing removal.  11/5/2020: Seen with wound team during 2 layer compression dressing change and for excisional debridement.  No odor noted today.  Thick layer of biofilm noted.  Wound edges continue to improve but still remain to medial aspect of wound.  11/17/2020: Seen with wound team during dressing and 2 layer compression wrap change.  Patient with severely thick scar tissue to wound edges.  Excisional debridement with injectable lidocaine and epinephrine performed today.  Patient denies any fevers, chills, nausea, vomiting.  11/23/2020: POD #4 SP left leg I&D with biologic and VAC  placement.  VAC functioning.  Foul odor coming from wound.  11/30/2020: POD #11.  Patient tired of walking around with wound VAC machine.  But agreeable to continue with wound VAC.  Mild odor coming from wound with wound VAC removal.  12/7/2020: POD #18.  Seen during wound VAC change.  Sitter no longer at bedside.        Results:  Covid screen not detected 11/17/2020  CBC 11/22/2020: WBC 6.5, hemoglobin and hematocrit 9.9/30.5.  Platelet count 403.  CBC with differential 10/14/2020: WBC 6, H&H 11/33.9  Wound culture: 9/28/2020: Positive for beta-hemolytic strep group A, Proteus.    Wound cx: 9/1/2020 mrsa, diphtheroids, beta hemolytic strep group A    8/7/2020: X-ray tib-fib left:  1.  Healed distal metaphyseal fractures of the left fibula and tibia with tibial IM layla in place.     2.  No acute fracture or dislocation.     3.  No radiopaque soft tissue foreign body.      Arterial studies:   9/2/2020  Right.    Doppler waveforms of the common femoral artery are of high amplitude and    triphasic.    Doppler waveforms at the ankle are brisk and triphasic.    Ankle-brachial index is normal.       Left.    Doppler waveforms of the common femoral artery are of high amplitude and    triphasic.    Doppler waveforms at the ankle are brisk and triphasic.    Ankle-brachial index is normal.    Unable to obtained popliteal waveforms due to wound bandages.          Exam:    Palpable PT pulse to left foot +1 foot   +2 DP left foot  Difficulty palpating popliteal due to scar tissue  Able to extend left leg to 160 degrees. Able to flex left leg around 80 degrees      Left lower leg full-thickness ulcer  Odor remains with heavy drainage  Skin bridge is no longer present and wound is now connected again at the popliteal fossa      Lateral ulcer:  Wound edges flat, semi-open   Granular tissue noted, with 25%/slough  Amnio fix skin substitute appeared to have been incorporated  No periwound erythema  Denuded periwound lateral  aspect  Edema controlled    Medial ulcer:  Fissure to distal aspect of Periwound has resolved and skin is intact.  Wound edges  open except for edges at 12:00   granular tissue with 25% slough  Amnio fix appears to be incorporated  Edema controlled        Photos  left leg lateral posterior view  measurement: 21 x 3.5 x 0.1                   Medial view left leg  Measurements 19 x 5 x 0.1          PROCEDURE:   -Curette used to debride wound bed.  Excisional debridement was performed to remove devitalized tissue until healthy, bleeding tissue was visualized.  50% of wound, 90cm2 debrided.  Tissue debrided into the subcutaneous layer.  Fibrinous tissue is noted underneath granular tissue  -Bleeding controlled with manual pressure.    -Wound care completed by wound PT refer to flowsheet  -Patient tolerated the procedure well, without c/o pain or discomfort.   -Dermatac for periwound underneath VAC      Post debridement lateral lower leg view  Post debridement measurements 21 x 3.5 x 0.2          Post debridement medial lower leg view  Post debridement measurements 19 x 5 x 0.2                    ASSESSMENT/PLAN:  65-year-old male with homelessness, EtOH use, tobacco use, and chronic left leg ulcer.    POD #18 SP left leg I&D with amnio fix and wound VAC placement by Dr. Olvera  Wound edges open starting to close at proximal aspect.   granular tissue with 25% slough.  Excisional debridement performed today.  Medically necessary to promote wound healing.   foul odor remains.  Increased slough formation, increased drainage.  Wound size has not changed from last week.  Additionally skin bridge is no longer present and wounds are connected again.  -Wound culture taken by wound team, follow.  -Continue with wound VAC.  Start nystatin powder for possible yeast involvement.  Start silver foam for continuous antimicrobial effect.  Hold off on ACell application at this time until wound culture results are back.   Will continue  to monitor and continue dressing frequency at 3 times per week.        PLAN  -Continue 3 times a week dressing changes.  This will better control drainage and odor.  -Follow wound culture.  Completed antibiotic course on 10/22/2020.   -Add silver foam and nystatin powder  -Continue 2 layer compression wrap extending to thigh  - Patient to continue to be seen by wound care team while admitted  Patient to continue to be followed by wound care nurse practitioner       Discharge plan:  Pending guardianship      D/W: Patient, RN, Miranda Zelaya, PT wound care,

## 2020-12-07 NOTE — CARE PLAN
Problem: Safety  Goal: Will remain free from injury  Outcome: PROGRESSING AS EXPECTED   Hourly rounds done. Call light within reach. Needs attended on a timely manner. Treaded socks on when OOB. Up self with steady gait. Calls appropriately.  Problem: Infection  Goal: Will remain free from infection  Outcome: PROGRESSING AS EXPECTED   Hand hygiene advocated. Educated on measures on how to prevent infection.  Wound vac Dressing to the LLE  kept CDI.   Problem: Discharge Barriers/Planning  Goal: Patient's continuum of care needs will be met  Outcome: PROGRESSING SLOWER THAN EXPECTED   Pending guardianship.  Court date schedule: 1/29/2021

## 2020-12-08 LAB
GRAM STN SPEC: NORMAL
SIGNIFICANT IND 70042: NORMAL
SITE SITE: NORMAL
SOURCE SOURCE: NORMAL

## 2020-12-08 PROCEDURE — 700102 HCHG RX REV CODE 250 W/ 637 OVERRIDE(OP): Performed by: NURSE PRACTITIONER

## 2020-12-08 PROCEDURE — 700101 HCHG RX REV CODE 250: Performed by: NURSE PRACTITIONER

## 2020-12-08 PROCEDURE — A9270 NON-COVERED ITEM OR SERVICE: HCPCS | Performed by: NURSE PRACTITIONER

## 2020-12-08 PROCEDURE — 770001 HCHG ROOM/CARE - MED/SURG/GYN PRIV*

## 2020-12-08 PROCEDURE — 700111 HCHG RX REV CODE 636 W/ 250 OVERRIDE (IP): Performed by: HOSPITALIST

## 2020-12-08 RX ADMIN — DIVALPROEX SODIUM 125 MG: 125 CAPSULE ORAL at 20:40

## 2020-12-08 RX ADMIN — HYDROXYZINE HYDROCHLORIDE 25 MG: 50 TABLET, FILM COATED ORAL at 05:01

## 2020-12-08 RX ADMIN — HYDROXYZINE HYDROCHLORIDE 25 MG: 50 TABLET, FILM COATED ORAL at 20:40

## 2020-12-08 RX ADMIN — Medication 400 MG: at 05:01

## 2020-12-08 RX ADMIN — Medication 400 MG: at 16:55

## 2020-12-08 RX ADMIN — CYCLOBENZAPRINE 10 MG: 10 TABLET, FILM COATED ORAL at 20:40

## 2020-12-08 RX ADMIN — CYCLOBENZAPRINE 10 MG: 10 TABLET, FILM COATED ORAL at 05:01

## 2020-12-08 RX ADMIN — GABAPENTIN 200 MG: 100 CAPSULE ORAL at 16:55

## 2020-12-08 RX ADMIN — GABAPENTIN 200 MG: 100 CAPSULE ORAL at 12:33

## 2020-12-08 RX ADMIN — GABAPENTIN 200 MG: 100 CAPSULE ORAL at 05:00

## 2020-12-08 RX ADMIN — OXYCODONE HYDROCHLORIDE 10 MG: 10 TABLET ORAL at 22:42

## 2020-12-08 RX ADMIN — AMLODIPINE BESYLATE 5 MG: 5 TABLET ORAL at 05:01

## 2020-12-08 RX ADMIN — DIVALPROEX SODIUM 125 MG: 125 CAPSULE ORAL at 16:55

## 2020-12-08 RX ADMIN — ENOXAPARIN SODIUM 40 MG: 40 INJECTION SUBCUTANEOUS at 05:01

## 2020-12-08 RX ADMIN — DIVALPROEX SODIUM 125 MG: 125 CAPSULE ORAL at 05:01

## 2020-12-08 RX ADMIN — OXYCODONE HYDROCHLORIDE 10 MG: 10 TABLET ORAL at 12:39

## 2020-12-08 RX ADMIN — RISPERIDONE 1 MG: 1 TABLET ORAL at 16:54

## 2020-12-08 RX ADMIN — OXYCODONE HYDROCHLORIDE 10 MG: 10 TABLET ORAL at 05:00

## 2020-12-08 RX ADMIN — OXYCODONE HYDROCHLORIDE 10 MG: 10 TABLET ORAL at 17:04

## 2020-12-08 RX ADMIN — RISPERIDONE 0.5 MG: 0.5 TABLET ORAL at 05:01

## 2020-12-08 RX ADMIN — LIDOCAINE 1 PATCH: 50 PATCH CUTANEOUS at 12:33

## 2020-12-08 ASSESSMENT — PAIN DESCRIPTION - PAIN TYPE
TYPE: ACUTE PAIN

## 2020-12-09 PROCEDURE — 700111 HCHG RX REV CODE 636 W/ 250 OVERRIDE (IP): Performed by: HOSPITALIST

## 2020-12-09 PROCEDURE — A9270 NON-COVERED ITEM OR SERVICE: HCPCS | Performed by: NURSE PRACTITIONER

## 2020-12-09 PROCEDURE — 700102 HCHG RX REV CODE 250 W/ 637 OVERRIDE(OP): Performed by: NURSE PRACTITIONER

## 2020-12-09 PROCEDURE — 99231 SBSQ HOSP IP/OBS SF/LOW 25: CPT | Performed by: NURSE PRACTITIONER

## 2020-12-09 PROCEDURE — 770001 HCHG ROOM/CARE - MED/SURG/GYN PRIV*

## 2020-12-09 PROCEDURE — 97606 NEG PRS WND THER DME>50 SQCM: CPT

## 2020-12-09 PROCEDURE — 700101 HCHG RX REV CODE 250: Performed by: NURSE PRACTITIONER

## 2020-12-09 RX ADMIN — OXYCODONE HYDROCHLORIDE 10 MG: 10 TABLET ORAL at 17:38

## 2020-12-09 RX ADMIN — GABAPENTIN 200 MG: 100 CAPSULE ORAL at 17:38

## 2020-12-09 RX ADMIN — HYDROXYZINE HYDROCHLORIDE 25 MG: 50 TABLET, FILM COATED ORAL at 21:36

## 2020-12-09 RX ADMIN — Medication 400 MG: at 17:38

## 2020-12-09 RX ADMIN — DIVALPROEX SODIUM 125 MG: 125 CAPSULE ORAL at 14:35

## 2020-12-09 RX ADMIN — RISPERIDONE 0.5 MG: 0.5 TABLET ORAL at 06:16

## 2020-12-09 RX ADMIN — DIVALPROEX SODIUM 125 MG: 125 CAPSULE ORAL at 21:36

## 2020-12-09 RX ADMIN — Medication 400 MG: at 06:15

## 2020-12-09 RX ADMIN — GABAPENTIN 200 MG: 100 CAPSULE ORAL at 06:16

## 2020-12-09 RX ADMIN — DIVALPROEX SODIUM 125 MG: 125 CAPSULE ORAL at 06:15

## 2020-12-09 RX ADMIN — CYCLOBENZAPRINE 10 MG: 10 TABLET, FILM COATED ORAL at 23:27

## 2020-12-09 RX ADMIN — ENOXAPARIN SODIUM 40 MG: 40 INJECTION SUBCUTANEOUS at 06:16

## 2020-12-09 RX ADMIN — GABAPENTIN 200 MG: 100 CAPSULE ORAL at 11:38

## 2020-12-09 RX ADMIN — OXYCODONE HYDROCHLORIDE 10 MG: 10 TABLET ORAL at 23:28

## 2020-12-09 RX ADMIN — OXYCODONE HYDROCHLORIDE 10 MG: 10 TABLET ORAL at 11:38

## 2020-12-09 RX ADMIN — RISPERIDONE 1 MG: 1 TABLET ORAL at 17:38

## 2020-12-09 ASSESSMENT — ENCOUNTER SYMPTOMS
SENSORY CHANGE: 0
DEPRESSION: 0
NERVOUS/ANXIOUS: 0
DIARRHEA: 0
DIZZINESS: 0
ABDOMINAL PAIN: 0
INSOMNIA: 0
SHORTNESS OF BREATH: 0
CONSTIPATION: 0
FEVER: 0
VOMITING: 0
FOCAL WEAKNESS: 0
HEADACHES: 0
WEAKNESS: 0
SPEECH CHANGE: 0
NAUSEA: 0
COUGH: 0

## 2020-12-09 ASSESSMENT — PAIN DESCRIPTION - PAIN TYPE
TYPE: ACUTE PAIN
TYPE: ACUTE PAIN

## 2020-12-09 NOTE — PROGRESS NOTES
Received report, assumed pt care. Pt a&o 2, VSS, assessment completed. Resting comfortably in bed with call light, bedside table in reach. No c/o at this time. Side rails up 2. Instructed to use call light when needing assistance verbalized understanding. Bed in low position. Will continue to monitor.

## 2020-12-09 NOTE — WOUND TEAM
[facility]  Inpatient Services  Wound and Skin Care Progress Note    Admission Date: 8/7/2020     Last order of IP CONSULT TO WOUND CARE was found on 9/1/2020 from Hospital Encounter on 8/7/2020     HPI, PMH, SH: Reviewed    Unit where seen by Wound Team: [id number]    WOUND CONSULT/FOLLOW UP RELATED TO:  Left leg follow-up    Self Report / Pain Level: pt slept through most of wound care  OBJECTIVE:  2 layer wrap intact, patient walking in room with vac on pole.    WOUND TYPE, LOCATION, CHARACTERISTICS (Pressure Injuries: location, stage, POA or date identified)      Negative Pressure Wound Therapy 11/20/20 Leg Lateral;Posterior;Medial Left (Active)   NPWT Pump Mode / Pressure Setting Ulta;Continuous;125 mmHg 12/09/20 1700   Dressing Type Medium;Black Foam (Regular) 12/09/20 1700   Number of Foam Pieces Used 4 12/09/20 1700   Canister Changed No 12/09/20 1700   Output (mL) 50 mL 12/06/20 0603   NEXT Dressing Change/Treatment Date 12/11/20 12/09/20 1700   WOUND NURSE ONLY - Time Spent with Patient (mins) 120 11/30/20 0900         Wound 11/19/20 Full Thickness Wound Leg Lateral;Posterior;Medial Left (Active)   Wound Image      12/07/20 1143   Site Assessment Pink;Red 12/09/20 1700   Periwound Assessment Clean;Dry;Intact;Scar tissue 12/09/20 1700   Margins Defined edges;Attached edges 12/09/20 1700   Closure Secondary intention 12/09/20 1700   Drainage Amount Moderate 12/09/20 1700   Drainage Description Serosanguineous 12/09/20 1700   Treatments Cleansed;Site care 12/09/20 1700   Wound Cleansing Approved Wound Cleanser 12/09/20 1700   Periwound Protectant Benzoin;Drape 12/09/20 1700   Dressing Cleansing/Solutions Not Applicable 12/09/20 1700   Dressing Options Wound Vac;Compression Wrap Two Layer 12/09/20 1700   Dressing Changed Changed 12/09/20 1700   Dressing Status Clean;Dry;Intact 12/09/20 1700   Dressing Change/Treatment Frequency Monday, Wednesday, Friday, and As Needed 12/09/20 1700   NEXT Dressing  Change/Treatment Date 20 1700   NEXT Weekly Photo (Inpatient Only) 20 1300   Non-staged Wound Description Full thickness 20 1300   Wound Bed Granulation (%) 60 % 20 1300   Wound Bed Slough (%) 40 % 20 1300   Wound Bed Granulation (%) - Post-Procedure 100 % 20 1300   Shape linear 20 1300   Wound Odor Strong 20 1700   Pulses Left;2+;DP;PT 20 1500   Exposed Structures None 20 170   WOUND NURSE ONLY - Time Spent with Patient (mins) 75 20 1700               VASCULAR    CESAR:   CESAR Results, Last 30 Days Us-cesar Single Level Bilat    Result Date: 2020  Narrative  Vascular Laboratory  Conclusions  1.  Normal bilateral CESAR'sGRUPO FUENTES  Age:    64    Gender:     M  MRN:    [id number]  :    1955      BSA:  Exam Date:     2020 09:59  Room #:     Inpatient  Priority:     Routine  Ht (in):             Wt (lb):  Ordering Physician:        EDDA CANADA  Referring Physician:       [id number]  Sonographer:               Deja Ellis [facility]  Study Type:                Complete Bilateral  Technical Quality:         Adequate  Indications:     Ulceration of LE  CPT Codes:       90901  ICD Codes:       707.1  History:         Nonhealing wound of left lower extremity.  Limitations:                 RIGHT  Waveform            Systolic BPs (mmHg)                             117           Brachial  Triphasic                                Common Femoral  Triphasic                  138           Posterior Tibial  Triphasic                  132           Dorsalis Pedis                                           Peroneal                             1.18          CESAR                                           TBI                       LEFT  Waveform        Systolic BPs (mmHg)                             114           Brachial   Triphasic                                Common Femoral  Triphasic                  127           Posterior Tibial  Triphasic                  122           Dorsalis Pedis                                           Peroneal                             1.09          KOSTAS                                           TBI  Findings  Right.  Doppler waveforms of the common femoral artery are of high amplitude and  triphasic.  Doppler waveforms at the ankle are brisk and triphasic.  Ankle-brachial index is normal.  Left.  Doppler waveforms of the common femoral artery are of high amplitude and  triphasic.  Doppler waveforms at the ankle are brisk and triphasic.  Ankle-brachial index is normal.  Unable to obtained popliteal waveforms due to wound bandages.  Additional testing was not performed in accordance with lower extremity  arterial evaluation protocol.  Clover Maya  (Electronically Signed)  Final Date:      02 September 2020                   11:14    Lab Values:    Lab Results   Component Value Date/Time    WBC 6.5 11/22/2020 02:44 AM    RBC 3.54 (L) 11/22/2020 02:44 AM    HEMOGLOBIN 9.9 (L) 11/22/2020 02:44 AM    HEMATOCRIT 30.5 (L) 11/22/2020 02:44 AM    CREACTPROT 1.07 (H) 08/12/2020 01:55 PM    SEDRATEWES 85 (H) 08/07/2020 01:20 PM    HBA1C 5.7 (H) 11/09/2018 06:30 PM        Culture Results show:  Recent Results (from the past 720 hour(s))   CULTURE WOUND W/ GRAM STAIN    Collection Time: 09/01/20  2:00 PM    Specimen: Left Leg; Wound   Result Value Ref Range    Significant Indicator POS (POS)     Source WND     Site LEFT LEG     Culture Result - (A)     Gram Stain Result       Moderate Gram positive cocci.  Moderate Gram positive rods.      Culture Result (A)      Beta Hemolytic Streptococcus group A  Moderate growth      Culture Result (A)      Methicillin Resistant Staphylococcus aureus  Moderate growth      Culture Result (A)      Diphtheroids  Moderate growth  Mixed morphologies.         INTERVENTIONS BY  WOUND TEAM:    Dressings removed cleansed wound bed with wound cleanser.  Meredith-wound cleansed with wound cleanser and patted dry.    Meredith-wound skin protected with benzoin and drape.  Nystatin to medial leg wound, black to all of wound bed, secured with regular drape.  TRAC pad placed.  NPWT resumed, no leaks noted.  Fan folded foam padding behind knee crease, and 2 layer compression wrap to the left leg and thigh.         Interdisciplinary consultation: Patient, Bedside RN      EVALUATION / RATIONALE FOR TREATMENT:    12/7/2020 POD 19 odor persists, improved wound bed after debridement.  Possible bacterial vs yeast vs fungal infection.  Culture taken.  Nystatin to treat possible fungal infection, silver foam to decrease microbial population.    12/4/2020 POD# 16 odor worsening, more yellow tissue present, will add nystatin and silver foam next week and consider a culture  11/30 POD#11 granular buds visible to wound bed.  Same as prior.   11/27 POD#8 wound is odorous likely related to biologic dressing.  No overt signs of infection.  Granular buds are visible through adaptic.  Edges do not appear as thick as they were prior to last sx.  Continue with current treatment.    11/23: POD#4 s/p I&D of left LE wounds and biologic placed by Dr. Olvera on 11/19.  Wound bed is flatter and raised edges scar tissue seems to be gone.   11/14 Posterior thigh aspect of wound deteriorating, will increase frequency of treatment to keep biofilm down and hopefully avoid systemic abx.  Will include thigh in compression wrap.  Will consider culture if improvement is not seen in the next few treatments.    11/10: APRN unavailable at this time.  Will re-schedule excisional debridement to next week.   11/5: Plan for debridement of scarred edges on Tues by LPS. Tendon exposed to posterior aspect of wound, behind knee. Continue with current dressing care.  10/29: Excisional debridement by Asaf ELLER of scar tissue along the proximal  edge of the posterior knee and lateral thigh.  Lateral aspect of thigh is flatten.  Medial aspect of knee has thick scar tissue that was excisionally debrided by Asaf ELLER.  Fully granulated throughout the wound bed.   10/23 wound bed mostly granular, superficial in depth, progress has been slow with the wound VAC so will trial collagen product to encourage new tissue growth.    10/19: wound bed has improved in color and is fully granulated.  Addison-wound has improved, denudation has resolved. APRN continuing with serial weekly debridements as needed.   10/16: wound friable pt now on iv abx per ID.  Indurated edges addressed with drape and foam.  Addison wound likely has yeast infection - addressing with silver powder crusting  10/14: patient seen with Asaf ELLER, stopping veraflo instillation.  Starting silver powder and regular wound VAC to see if it will help with bioburden.  Possible colonization of bacteria.  ID involved.   10/12: Inflammation noted to the proximal part of the wound bed.  Will continue to monitor, possible vac break on Wednesday to help addison-skin inflammation.   10/7: Excisional debridement performed by Asaf ELLER. Will need to continue selective debridement with NPWT changes, and weekly excisional debridement with APRN to flatten out the scar tissue.  IV abx therapy ended 10/5.   10/5: Lateral wound still with some slough, both wounds smaller per measurements. Medial wound with all granular tissue.  9/30: Patient to start abx therapy for 7 days course per Dr. Ellis.  Started dakin's instillation to veraflo  9/28: malodorous today, culture taken.    9/25: Odor noted, though unclear if from wound or pt, consider shower before next Vac change. Consider regular vac next change, wound granular and superficial.   9/23: Patient still awaiting guardianship for placement.   9/18: wound granulating nicely and responding well to current treatment.    9/16: Wound bed with granular  tissue, edges are thick but appear better than previous assessments. . NPWT VF to encourage closure by secondary intention and manage drainage while encouraging oxygenation and granulation to the wound bed. 2 layer compression to assist in decrease of swelling and encourage blood flow to wounds. Wound team to follow up and change 3x/week.      Goals: Steady decrease in wound area and depth weekly.    NURSING PLAN OF CARE ORDERS (X):    Dressing changes: See Dressing Care orders: X  Skin care: See Skin Care orders:   Rectal tube care: See Rectal Tube Care orders:   Other orders:      WOUND TEAM PLAN OF CARE:   Dressing changes by wound team:        Follow up 3 times weekly:                NPWT change 3 times weekly:  X,  NPWT changes 3x/ weekly with 2 layer compression wrap.   Follow up 1-2 times weekly:      Follow up Bi-Monthly:                   Follow up as needed:       Other (explain):     Anticipated discharge plans:  LTACH:        SNF/Rehab: X - patient will need ongoing wound care for LLE upon discharge - 3x/weekly           Home Health Care:           Outpatient Wound Center:            Self Care:

## 2020-12-09 NOTE — CARE PLAN
Problem: Knowledge Deficit  Goal: Knowledge of disease process/condition, treatment plan, diagnostic tests, and medications will improve  Outcome: PROGRESSING SLOWER THAN EXPECTED  Note:  Pt gets agitated but then becomes agreeable once explained of the purpose and importance of the wound vac     Problem: Discharge Barriers/Planning  Goal: Patient's continuum of care needs will be met  Outcome: PROGRESSING SLOWER THAN EXPECTED  Note: Court on 1/29 at 1000 for guardianship

## 2020-12-09 NOTE — PROGRESS NOTES
Hospital Medicine Twice Weekly Progress Note    Date of Service  12/6/2020    Chief Complaint  LLE wound    Hospital Course  Mr. Varela is a 65-year-old male who presented to the emergency department with a left lower extremity wound infection. He was hospitalized at our facility in April and evaluated by orthopedic surgery who recommended wound care. Wound cultures at that time grew MSSA and Streptococcus. He had been seen at [facility] earlier that week and prescribed antibiotics, however he had not filled the prescription.  An ultrasound of that extremity was done and was negative for DVT.  He was treated for sepsis and completed an antibiotic course on 9/16/2020. He was also found to have head lice and underwent treatment for that.  A repeat wound culture was done on 9/28/2020 and grew Strep A and Proteus. Infectious disease was consulted and recommended a 5-day course of IV zosyn which was completed on 10/5/2020. On 10/12/2020 the wound care team noticed increased inflammation to the proximal part of the wound bed and switched from a vera flow wound VAC to a regular wound VAC.  ID was again consulted and on 10/14/2020 recommended to 7-day course of antibiotics with meropenem which was completed on 10/22/2020. Additionally, he was noted to have intermittent agitation so was started on risperdal, depakote, and gabapentin per psychiatric recommendations.  On 11/20/2020 he underwent left lower extremity debridement with wound VAC placement.  Since then he has remained stable.  He has been deemed incapacitated to make medical decisions and is currently pending guardianship (which he is contesting) and placement, likely to a group home.     Interval Problem Update  12/9:  Repeat wound cx negative.  Wound VAC intact.  The patient denies any acute pain or discomfort.  He does exhibit labile moods as he tends to intermittently yell and become agitated.  He had to be reminded that he has in the hospital.  Vital  signs stable.    Consultants/Specialty  LPS  Infectious disease  Psychiatry    Code Status  Full Code    Disposition  Pending guardianship (which he is contesting), then will likely pursue group home placement.    Court date is now scheduled for 1/29/2021 at 10 AM.    Review of Systems  Review of Systems   Constitutional: Negative for fever and malaise/fatigue.   Respiratory: Negative for cough and shortness of breath.    Cardiovascular: Negative for chest pain and leg swelling.   Gastrointestinal: Negative for abdominal pain, constipation, diarrhea, nausea and vomiting.   Genitourinary: Negative for dysuria, frequency and urgency.   Skin:        Left lower extremity wound with wound VAC present   Neurological: Negative for dizziness, sensory change, speech change, focal weakness, weakness and headaches.   Psychiatric/Behavioral: Negative for depression. The patient is not nervous/anxious and does not have insomnia.    All other systems reviewed and are negative.     Physical Exam  Temp:  [36.1 °C (96.9 °F)-36.4 °C (97.6 °F)] 36.1 °C (96.9 °F)  Pulse:  [80-91] 80  Resp:  [16-18] 18  BP: ()/(57-75) 96/57  SpO2:  [94 %-96 %] 96 %    Physical Exam  Vitals signs and nursing note reviewed.   Constitutional:       General: He is awake. He is not in acute distress.     Appearance: Normal appearance. He is well-developed. He is not ill-appearing.   HENT:      Head: Normocephalic and atraumatic.      Mouth/Throat:      Lips: Pink.      Mouth: Mucous membranes are moist.   Eyes:      Conjunctiva/sclera: Conjunctivae normal.      Pupils: Pupils are equal, round, and reactive to light.   Neck:      Musculoskeletal: Normal range of motion and neck supple.   Cardiovascular:      Rate and Rhythm: Normal rate and regular rhythm.      Pulses: Normal pulses.      Heart sounds: Normal heart sounds.   Pulmonary:      Effort: Pulmonary effort is normal.      Breath sounds: Normal breath sounds.   Abdominal:      General: Bowel  sounds are normal. There is no distension or abdominal bruit.      Palpations: Abdomen is soft.      Tenderness: There is no abdominal tenderness.   Musculoskeletal:      Right lower leg: No edema.      Left lower leg: No edema.   Skin:     General: Skin is warm and dry.      Findings: Wound present.          Neurological:      General: No focal deficit present.      Mental Status: He is alert and oriented to person, place, and time.      GCS: GCS eye subscore is 4. GCS verbal subscore is 5. GCS motor subscore is 6.      Gait: Gait normal.   Psychiatric:         Attention and Perception: Attention and perception normal.         Mood and Affect: Affect normal. Mood is depressed.         Speech: Speech normal.         Behavior: Behavior normal. Behavior is cooperative.         Thought Content: Thought content normal.         Cognition and Memory: Cognition normal. He exhibits impaired recent memory.         Judgment: Judgment normal.     Fluids    Intake/Output Summary (Last 24 hours) at 12/9/2020 0847  Last data filed at 12/9/2020 0500  Gross per 24 hour   Intake 1320 ml   Output --   Net 1320 ml     Laboratory    Imaging  IR-US GUIDED PIV   Final Result    Ultrasound-guided PERIPHERAL IV INSERTION performed by    qualified nursing staff as above.      IR-MIDLINE CATHETER INSERTION WO GUIDANCE > AGE 3   Final Result                  Ultrasound-guided midline placement performed by qualified nursing staff    as above.          IR-US GUIDED PIV   Final Result    Ultrasound-guided PERIPHERAL IV INSERTION performed by    qualified nursing staff as above.      IR-MIDLINE CATHETER INSERTION WO GUIDANCE > AGE 3   Final Result                  Ultrasound-guided midline placement performed by qualified nursing staff    as above.          US-KOSTAS SINGLE LEVEL BILAT   Final Result      CT-HEAD W/O   Final Result      No acute intracranial abnormality      DX-TIBIA AND FIBULA LEFT   Final Result      1.  Healed distal metaphyseal  fractures of the left fibula and tibia with tibial IM layla in place.      2.  No acute fracture or dislocation.      3.  No radiopaque soft tissue foreign body.      DX-FEMUR-2+ LEFT   Final Result      1.  No radiographic evidence of acute traumatic injury left femur.      2.  Unchanged cortical thickening in the posterior aspect of the distal femoral diaphysis which may represent sequela of prior injury or infection.      3.  No soft tissue foreign body identified.      US-EXTREMITY VENOUS LOWER UNILAT LEFT   Final Result      DX-CHEST-PORTABLE (1 VIEW)   Final Result      No acute cardiopulmonary abnormality.         Assessment/Plan  * Infection of wound due to methicillin resistant Staphylococcus aureus (MRSA)- (present on admission)  Assessment & Plan  -History of MRSA.  -Poor compliance with OP wound care. Poor compliance with wound vac at times.   -LLE wound s/p debridement and wound vac placement on 11/20/2020.  -Continue pain control as needed.  -LPS continues to follow.  12/9:  Repeat wound cx negative.  Continue nystatin per LPS recommendations.     Encephalopathy- (present on admission)  Assessment & Plan  -Acute on chronic, likely due Wernicke's. Improved.   -Per psychiatry and Dr. Velázquez: Patient is incapacitated.  -Guardianship and placement pending.  Patient is contesting guardianship, hearing is now scheduled for 1/29/2021 at 10 AM.    Psychiatric disorder- (present on admission)  Assessment & Plan  -Unspecified.  -Continue Risperdal and Depakote.  -Psychiatry has consulted and deemed him incapacitated to leave AMA or make medical decisions  -Pending guardianship, which he is contesting.    Essential hypertension- (present on admission)  Assessment & Plan  -Well-controlled on current regimen.    -Continue amlodipine.    Flexion contracture of knee, 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)  Assessment & Plan  -Secondary to chronic wound in that area.  -PT/OT.  -Does not seem to limit his mobility.  Is frequently  ambulatory.    Emphysema/COPD (HCC)- (present on admission)  Assessment & Plan  -Chronic and stable, without acute exacerbation.    Alcohol dependence (HCC)- (present on admission)  Assessment & Plan  -History of. Abstinent since admission.    Protein malnutrition (HCC)- (present on admission)  Assessment & Plan  -Body mass index is 21.35 kg/m².   -Continue TID supplements.  -Encourage PO intake.    Tobacco dependence- (present on admission)  Assessment & Plan  -Abstinent since admission.     VTE prophylaxis: Frequently ambulatory    Change in ROS or PE since prior evaluation on 12/6/2020.

## 2020-12-10 PROCEDURE — A9270 NON-COVERED ITEM OR SERVICE: HCPCS | Performed by: NURSE PRACTITIONER

## 2020-12-10 PROCEDURE — 770001 HCHG ROOM/CARE - MED/SURG/GYN PRIV*

## 2020-12-10 PROCEDURE — 700111 HCHG RX REV CODE 636 W/ 250 OVERRIDE (IP): Performed by: HOSPITALIST

## 2020-12-10 PROCEDURE — 700102 HCHG RX REV CODE 250 W/ 637 OVERRIDE(OP): Performed by: NURSE PRACTITIONER

## 2020-12-10 PROCEDURE — 700101 HCHG RX REV CODE 250: Performed by: NURSE PRACTITIONER

## 2020-12-10 RX ADMIN — RISPERIDONE 1 MG: 1 TABLET ORAL at 17:04

## 2020-12-10 RX ADMIN — GABAPENTIN 200 MG: 100 CAPSULE ORAL at 17:04

## 2020-12-10 RX ADMIN — ENOXAPARIN SODIUM 40 MG: 40 INJECTION SUBCUTANEOUS at 06:46

## 2020-12-10 RX ADMIN — OXYCODONE HYDROCHLORIDE 10 MG: 10 TABLET ORAL at 13:01

## 2020-12-10 RX ADMIN — DIVALPROEX SODIUM 125 MG: 125 CAPSULE ORAL at 13:00

## 2020-12-10 RX ADMIN — GABAPENTIN 200 MG: 100 CAPSULE ORAL at 13:00

## 2020-12-10 RX ADMIN — Medication 400 MG: at 17:05

## 2020-12-10 RX ADMIN — DIVALPROEX SODIUM 125 MG: 125 CAPSULE ORAL at 06:46

## 2020-12-10 RX ADMIN — AMLODIPINE BESYLATE 5 MG: 5 TABLET ORAL at 06:46

## 2020-12-10 RX ADMIN — Medication 400 MG: at 06:46

## 2020-12-10 RX ADMIN — GABAPENTIN 200 MG: 100 CAPSULE ORAL at 06:46

## 2020-12-10 RX ADMIN — OXYCODONE HYDROCHLORIDE 10 MG: 10 TABLET ORAL at 06:46

## 2020-12-10 RX ADMIN — RISPERIDONE 0.5 MG: 0.5 TABLET ORAL at 06:46

## 2020-12-10 RX ADMIN — OXYCODONE HYDROCHLORIDE 10 MG: 10 TABLET ORAL at 23:04

## 2020-12-10 RX ADMIN — LIDOCAINE 1 PATCH: 50 PATCH CUTANEOUS at 13:01

## 2020-12-10 RX ADMIN — CYCLOBENZAPRINE 10 MG: 10 TABLET, FILM COATED ORAL at 13:00

## 2020-12-10 RX ADMIN — DIVALPROEX SODIUM 125 MG: 125 CAPSULE ORAL at 23:04

## 2020-12-10 ASSESSMENT — PAIN DESCRIPTION - PAIN TYPE
TYPE: ACUTE PAIN

## 2020-12-10 NOTE — PROGRESS NOTES
Received report of patient at start of shift. Patient is AOx4, PRN medications administered for complaints of pain. Assessment complete, on room air. Wound vac to LLE, dressing CDI. Patient ambulates independently in room and throughout nursing station. Encouraged to use call light for assistance. Safety precautions in place.   breastfeeding exclusively

## 2020-12-10 NOTE — CARE PLAN
Problem: Discharge Barriers/Planning  Goal: Patient's continuum of care needs will be met  Outcome: PROGRESSING AS EXPECTED  Note: Patient pending guardianship hearing and placement. Case management assisting.      Problem: Pain Management  Goal: Pain level will decrease to patient's comfort goal  Outcome: PROGRESSING AS EXPECTED  Note: Pain level frequently assessed. PRN oxycodone administered per MAR.

## 2020-12-11 PROCEDURE — 700102 HCHG RX REV CODE 250 W/ 637 OVERRIDE(OP): Performed by: NURSE PRACTITIONER

## 2020-12-11 PROCEDURE — 700111 HCHG RX REV CODE 636 W/ 250 OVERRIDE (IP): Performed by: HOSPITALIST

## 2020-12-11 PROCEDURE — A9270 NON-COVERED ITEM OR SERVICE: HCPCS | Performed by: NURSE PRACTITIONER

## 2020-12-11 PROCEDURE — 770001 HCHG ROOM/CARE - MED/SURG/GYN PRIV*

## 2020-12-11 PROCEDURE — 700101 HCHG RX REV CODE 250: Performed by: NURSE PRACTITIONER

## 2020-12-11 PROCEDURE — 99231 SBSQ HOSP IP/OBS SF/LOW 25: CPT | Performed by: NURSE PRACTITIONER

## 2020-12-11 PROCEDURE — 97606 NEG PRS WND THER DME>50 SQCM: CPT

## 2020-12-11 RX ADMIN — LIDOCAINE 1 PATCH: 50 PATCH CUTANEOUS at 13:23

## 2020-12-11 RX ADMIN — DIVALPROEX SODIUM 125 MG: 125 CAPSULE ORAL at 04:16

## 2020-12-11 RX ADMIN — GABAPENTIN 200 MG: 100 CAPSULE ORAL at 04:16

## 2020-12-11 RX ADMIN — ENOXAPARIN SODIUM 40 MG: 40 INJECTION SUBCUTANEOUS at 04:18

## 2020-12-11 RX ADMIN — CYCLOBENZAPRINE 10 MG: 10 TABLET, FILM COATED ORAL at 20:37

## 2020-12-11 RX ADMIN — Medication 400 MG: at 04:17

## 2020-12-11 RX ADMIN — DIVALPROEX SODIUM 125 MG: 125 CAPSULE ORAL at 20:37

## 2020-12-11 RX ADMIN — HYDROXYZINE HYDROCHLORIDE 25 MG: 50 TABLET, FILM COATED ORAL at 13:23

## 2020-12-11 RX ADMIN — OXYCODONE HYDROCHLORIDE 10 MG: 10 TABLET ORAL at 09:14

## 2020-12-11 RX ADMIN — GABAPENTIN 200 MG: 100 CAPSULE ORAL at 13:23

## 2020-12-11 RX ADMIN — GABAPENTIN 200 MG: 100 CAPSULE ORAL at 16:45

## 2020-12-11 RX ADMIN — AMLODIPINE BESYLATE 5 MG: 5 TABLET ORAL at 04:17

## 2020-12-11 RX ADMIN — OXYCODONE HYDROCHLORIDE 10 MG: 10 TABLET ORAL at 16:45

## 2020-12-11 RX ADMIN — OXYCODONE HYDROCHLORIDE 10 MG: 10 TABLET ORAL at 22:49

## 2020-12-11 RX ADMIN — RISPERIDONE 0.5 MG: 0.5 TABLET ORAL at 04:17

## 2020-12-11 RX ADMIN — RISPERIDONE 1 MG: 1 TABLET ORAL at 16:43

## 2020-12-11 RX ADMIN — DIVALPROEX SODIUM 125 MG: 125 CAPSULE ORAL at 13:23

## 2020-12-11 RX ADMIN — Medication 400 MG: at 16:43

## 2020-12-11 ASSESSMENT — ENCOUNTER SYMPTOMS
PALPITATIONS: 0
HEADACHES: 0
SHORTNESS OF BREATH: 0
SENSORY CHANGE: 0
DEPRESSION: 1
ABDOMINAL PAIN: 0
DIARRHEA: 0
FEVER: 0
CHILLS: 0
INSOMNIA: 0
NAUSEA: 0
FOCAL WEAKNESS: 0
FALLS: 0
SPEECH CHANGE: 0
CONSTIPATION: 0
VOMITING: 0
NERVOUS/ANXIOUS: 1
COUGH: 0
EYES NEGATIVE: 1
WEAKNESS: 0
DIZZINESS: 0

## 2020-12-11 ASSESSMENT — PAIN DESCRIPTION - PAIN TYPE
TYPE: ACUTE PAIN
TYPE: CHRONIC PAIN
TYPE: CHRONIC PAIN;SURGICAL PAIN
TYPE: CHRONIC PAIN

## 2020-12-11 NOTE — PROGRESS NOTES
Hospital Medicine Twice Weekly Progress Note    Date of Service  12/11/2020    Chief Complaint  LLE wound    Hospital Course  Mr. Varela is a 65-year-old male who presented to the emergency department with a left lower extremity wound infection. He was hospitalized at our facility in April and evaluated by orthopedic surgery who recommended wound care. Wound cultures at that time grew MSSA and Streptococcus. He had been seen at [facility] earlier that week and prescribed antibiotics, however he had not filled the prescription.  An ultrasound of that extremity was done and was negative for DVT.  He was treated for sepsis and completed an antibiotic course on 9/16/2020. He was also found to have head lice and underwent treatment for that.  A repeat wound culture was done on 9/28/2020 and grew Strep A and Proteus. Infectious disease was consulted and recommended a 5-day course of IV zosyn which was completed on 10/5/2020. On 10/12/2020 the wound care team noticed increased inflammation to the proximal part of the wound bed and switched from a vera flow wound VAC to a regular wound VAC.  ID was again consulted and on 10/14/2020 recommended to 7-day course of antibiotics with meropenem which was completed on 10/22/2020. Additionally, he was noted to have intermittent agitation so was started on risperdal, depakote, and gabapentin per psychiatric recommendations.  On 11/20/2020 he underwent left lower extremity debridement with wound VAC placement.  Since then he has remained stable.  He has been deemed incapacitated to make medical decisions and is currently pending guardianship (which he is contesting) and placement, likely to a group home.   Court date now scheduled for 1/29/2021 at 10 AM    Interval Problem Update  -patient transitioned from pending long-term list today. Seen and examined. Ambulating in his room with his wound vac on an IV pole. His main complaint is his face mask rubbing on his ears.   -Per  most recent wound care notes, odor persists.  Wound bed improved after debridement.  Concern for possible bacterial versus fungal infection.  Cultures taken on 12/7 show no growth to date.    Consultants/Specialty  -LPS  -ID  -Psychiatry    Code Status  Full Code    Disposition  Pending placement    Review of Systems  Review of Systems   Constitutional: Negative for chills, fever and malaise/fatigue.   Eyes: Negative.    Respiratory: Negative for cough and shortness of breath.    Cardiovascular: Positive for leg swelling. Negative for chest pain and palpitations.   Gastrointestinal: Negative for abdominal pain, constipation, diarrhea, nausea and vomiting.   Genitourinary: Negative for dysuria, frequency and urgency.   Musculoskeletal: Negative for falls.        .   Skin: Negative for itching.   Neurological: Negative for dizziness, sensory change, speech change, focal weakness, weakness and headaches.   Psychiatric/Behavioral: Positive for depression (sometimes). Negative for suicidal ideas. The patient is nervous/anxious (sometimes). The patient does not have insomnia.    All other systems reviewed and are negative.       Physical Exam  Temp:  [37.1 °C (98.7 °F)] 37.1 °C (98.7 °F)  Pulse:  [79] 79  Resp:  [17] 17  BP: (104)/(49) 104/49  SpO2:  [93 %] 93 %    Physical Exam  Vitals signs and nursing note reviewed. Exam conducted with a chaperone present.   Constitutional:       General: He is awake. He is not in acute distress.     Appearance: He is underweight. He is ill-appearing. He is not toxic-appearing or diaphoretic.   HENT:      Head: Normocephalic and atraumatic.      Nose: Nose normal.      Mouth/Throat:      Lips: Pink.      Mouth: Mucous membranes are moist.   Eyes:      General: No scleral icterus.     Conjunctiva/sclera: Conjunctivae normal.   Neck:      Musculoskeletal: Normal range of motion.   Cardiovascular:      Rate and Rhythm: Normal rate.   Pulmonary:      Effort: Pulmonary effort is normal.    Abdominal:      General: There is no distension.      Tenderness: There is no abdominal tenderness.   Musculoskeletal:      Right lower leg: No edema.      Left lower leg: No edema.      Comments: Ambulates.   Skin:     General: Skin is warm and dry.      Comments: Wound Vac intact/functioning appropriately   Neurological:      Mental Status: He is alert.      Coordination: Coordination is intact.      Gait: Gait is intact. Gait normal.      Comments: Oriented to himself and hospital   Psychiatric:         Mood and Affect: Mood normal. Affect is labile.         Behavior: Behavior is not aggressive or combative. Behavior is cooperative.         Cognition and Memory: Cognition is impaired. He exhibits impaired recent memory.         Judgment: Judgment is impulsive.      Comments:            Fluids    Intake/Output Summary (Last 24 hours) at 12/11/2020 1322  Last data filed at 12/10/2020 1800  Gross per 24 hour   Intake 1000 ml   Output --   Net 1000 ml       Laboratory                        Imaging  IR-US GUIDED PIV   Final Result    Ultrasound-guided PERIPHERAL IV INSERTION performed by    qualified nursing staff as above.      IR-MIDLINE CATHETER INSERTION WO GUIDANCE > AGE 3   Final Result                  Ultrasound-guided midline placement performed by qualified nursing staff    as above.          IR-US GUIDED PIV   Final Result    Ultrasound-guided PERIPHERAL IV INSERTION performed by    qualified nursing staff as above.      IR-MIDLINE CATHETER INSERTION WO GUIDANCE > AGE 3   Final Result                  Ultrasound-guided midline placement performed by qualified nursing staff    as above.          US-KOSTAS SINGLE LEVEL BILAT   Final Result      CT-HEAD W/O   Final Result      No acute intracranial abnormality      DX-TIBIA AND FIBULA LEFT   Final Result      1.  Healed distal metaphyseal fractures of the left fibula and tibia with tibial IM layla in place.      2.  No acute fracture or dislocation.      3.  No  radiopaque soft tissue foreign body.      DX-FEMUR-2+ LEFT   Final Result      1.  No radiographic evidence of acute traumatic injury left femur.      2.  Unchanged cortical thickening in the posterior aspect of the distal femoral diaphysis which may represent sequela of prior injury or infection.      3.  No soft tissue foreign body identified.      US-EXTREMITY VENOUS LOWER UNILAT LEFT   Final Result      DX-CHEST-PORTABLE (1 VIEW)   Final Result      No acute cardiopulmonary abnormality.           Assessment/Plan  * Infection of wound due to methicillin resistant Staphylococcus aureus (MRSA)- (present on admission)  Assessment & Plan  -History of MRSA.  -Poor compliance with OP wound care. Poor compliance with wound vac at times.   -Continue pain control as needed.  -LPS and wound care following -debridements and wound VAC changes per their recommendations  -Cultures repeated 12/7 -negative to date        Encephalopathy- (present on admission)  Assessment & Plan  -Acute on chronic, likely due Wernicke's. Improved.   -Per psychiatry and Dr. Velázquez: Patient is incapacitated.  -Guardianship and placement pending.  Patient is contesting guardianship, hearing is now scheduled for 1/29/2021 at 10 AM.    Psychiatric disorder- (present on admission)  Assessment & Plan  -Unspecified.  -Continue Risperdal and Depakote.  -Psychiatry has consulted and deemed him incapacitated to leave AMA or make medical decisions  -Pending guardianship, which he is contesting.    Essential hypertension- (present on admission)  Assessment & Plan  -Controlled on amlodipine    Flexion contracture of knee, 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)  Assessment & Plan  -Secondary to chronic wound in that area.  -PT/OT.  -Does not seem to limit his mobility.  Is frequently ambulatory.    Emphysema/COPD (HCC)- (present on admission)  Assessment & Plan  -Chronic and stable, without acute exacerbation.    Alcohol dependence (HCC)- (present on admission)  Assessment  & Plan  -History of. Abstinent since admission.    Protein malnutrition (HCC)- (present on admission)  Assessment & Plan  -Body mass index is 21.35 kg/m².   -Continue TID supplements.  -Encourage PO intake.    Tobacco dependence- (present on admission)  Assessment & Plan  -Abstinent since admission.       VTE prophylaxis: Ambulatory      I have performed a physical exam and reviewed and updated ROS and Assessment/Plan today (12/11/20). In review of the previous note there are no changes except as documented above    I certify the patient requires continued medically necessary hospital services for the treatment of LLE wound requiring wound vac and intermittent antibiotic therapy.  The patient will remain in the hospital for the foreseeable future.  Discharge may or may not occur in the next 20 days due to ongoing discharge delays due to having no medically acceptable discharge options.    Please note that this dictation was created using voice recognition software. I have made every reasonable attempt to correct obvious errors, but there may be errors of grammar and possibly content that I did not discover before finalizing the note.      LOUISE Khan.

## 2020-12-11 NOTE — WOUND TEAM
[facility]  Inpatient Services  Wound and Skin Care Progress Note    Admission Date: 8/7/2020     Last order of IP CONSULT TO WOUND CARE was found on 9/1/2020 from Hospital Encounter on 8/7/2020     HPI, PMH, SH: Reviewed    Unit where seen by Wound Team: [id number]    WOUND CONSULT/FOLLOW UP RELATED TO:  Left leg follow-up    Self Report / Pain Level: pt slept through most of wound care  OBJECTIVE:  2 layer wrap intact, patient walking in room with vac on pole.    WOUND TYPE, LOCATION, CHARACTERISTICS (Pressure Injuries: location, stage, POA or date identified)         Negative Pressure Wound Therapy 11/20/20 Leg Lateral;Posterior;Medial Left (Active)   NPWT Pump Mode / Pressure Setting Ulta;Continuous;125 mmHg 12/11/20 2200   Dressing Type Medium;Black Foam (Regular) 12/11/20 1100   Number of Foam Pieces Used 4 12/09/20 1700   Canister Changed No 12/11/20 1100   Output (mL) 50 mL 12/06/20 0603   NEXT Dressing Change/Treatment Date 12/14/20 12/11/20 2200   WOUND NURSE ONLY - Time Spent with Patient (mins) 120 11/30/20 0900           Wound 11/19/20 Full Thickness Wound Leg Lateral;Posterior;Medial Left (Active)   Wound Image   12/11/20 1016   Site Assessment Pink;Red 12/11/20 2200   Periwound Assessment Clean;Dry 12/11/20 2200   Margins Defined edges;Attached edges 12/11/20 1100   Closure Secondary intention 12/11/20 1100   Drainage Amount Moderate 12/11/20 1100   Drainage Description Serosanguineous 12/11/20 1100   Treatments Site care;Cleansed;Compression 12/11/20 1016   Wound Cleansing Approved Wound Cleanser 12/11/20 1100   Periwound Protectant Benzoin;Drape 12/11/20 1100   Dressing Cleansing/Solutions Not Applicable 12/11/20 1100   Dressing Options Wound Vac;Compression Wrap Two Layer 12/11/20 1100   Dressing Changed Observed 12/11/20 2200   Dressing Status Clean;Dry;Intact 12/11/20 2200   Dressing Change/Treatment Frequency Monday, Wednesday, Friday, and As Needed 12/11/20 2200   NEXT Dressing  Change/Treatment Date 20 2200   NEXT Weekly Photo (Inpatient Only) 20 1300   Non-staged Wound Description Full thickness 20 1016   Wound Bed Granulation (%) 100 % 20 1016   Wound Bed Slough (%) 40 % 20 1300   Wound Bed Granulation (%) - Post-Procedure 100 % 20 1300   Shape linear 20 1016   Wound Odor Foul 20 1016   Pulses Left;2+;DP;PT 20 1500   Exposed Structures None 20 1016   WOUND NURSE ONLY - Time Spent with Patient (mins) 75 20 1700      Wounds are connected however being measured as 2 wounds - posterior thigh and medial leg.  Posterior thigh measurements include superior portion down to the narrowest part of wound by knee crease.      VASCULAR    CESAR:   CESAR Results, Last 30 Days Us-cesar Single Level Bilat    Result Date: 2020  Narrative  Vascular Laboratory  Conclusions  1.  Normal bilateral CESAR's.  GRUPO GANN  Age:    64    Gender:     M  MRN:    [id number]  :    1955      BSA:  Exam Date:     2020 09:59  Room #:     Inpatient  Priority:     Routine  Ht (in):             Wt (lb):  Ordering Physician:        EDDA CANADA  Referring Physician:       [id number] Morrissey  Sonographer:               Deja Ellis RDMS                             RVT  Study Type:                Complete Bilateral  Technical Quality:         Adequate  Indications:     Ulceration of LE  CPT Codes:       86929  ICD Codes:       707.1  History:         Nonhealing wound of left lower extremity.  Limitations:                 RIGHT  Waveform            Systolic BPs (mmHg)                             117           Brachial  Triphasic                                Common Femoral  Triphasic                  138           Posterior Tibial  Triphasic                  132           Dorsalis Pedis                                           Peroneal                              1.18          KOSTAS                                           TBI                       LEFT  Waveform        Systolic BPs (mmHg)                             114           Brachial  Triphasic                                Common Femoral  Triphasic                  127           Posterior Tibial  Triphasic                  122           Dorsalis Pedis                                           Peroneal                             1.09          KOSTAS                                           TBI  Findings  Right.  Doppler waveforms of the common femoral artery are of high amplitude and  triphasic.  Doppler waveforms at the ankle are brisk and triphasic.  Ankle-brachial index is normal.  Left.  Doppler waveforms of the common femoral artery are of high amplitude and  triphasic.  Doppler waveforms at the ankle are brisk and triphasic.  Ankle-brachial index is normal.  Unable to obtained popliteal waveforms due to wound bandages.  Additional testing was not performed in accordance with lower extremity  arterial evaluation protocol.  Clover Maya  (Electronically Signed)  Final Date:      02 September 2020                   11:14    Lab Values:    Lab Results   Component Value Date/Time    WBC 6.5 11/22/2020 02:44 AM    RBC 3.54 (L) 11/22/2020 02:44 AM    HEMOGLOBIN 9.9 (L) 11/22/2020 02:44 AM    HEMATOCRIT 30.5 (L) 11/22/2020 02:44 AM    CREACTPROT 1.07 (H) 08/12/2020 01:55 PM    SEDRATEWES 85 (H) 08/07/2020 01:20 PM    HBA1C 5.7 (H) 11/09/2018 06:30 PM        Culture Results show:  Recent Results (from the past 720 hour(s))   CULTURE WOUND W/ GRAM STAIN    Collection Time: 09/01/20  2:00 PM    Specimen: Left Leg; Wound   Result Value Ref Range    Significant Indicator POS (POS)     Source WND     Site LEFT LEG     Culture Result - (A)     Gram Stain Result       Moderate Gram positive cocci.  Moderate Gram positive rods.      Culture Result (A)      Beta Hemolytic Streptococcus group A  Moderate growth      Culture  Result (A)      Methicillin Resistant Staphylococcus aureus  Moderate growth      Culture Result (A)      Diphtheroids  Moderate growth  Mixed morphologies.         INTERVENTIONS BY WOUND TEAM:    Dressings removed cleansed wound bed with wound cleanser.  Meredith-wound cleansed with wound cleanser and patted dry.    Meredith-wound skin protected with benzoin and dermatac drape.    Nystatin to wound bed,   Silver to all of wound bed,   secured with regular drape.  TRAC pad placed.  NPWT resumed, no leaks noted.  mepilex to lateral leg wound near fibular head.  Fan folded foam padding behind knee crease, and 2 layer compression wrap to the left leg and thigh.         Interdisciplinary consultation: Patient, Bedside RN, Asaf MORENO spoke with Dr Ellis with ID re possible source of odor.  Student pharmacist involved with seeing if micro can run culture taken 12/7 for anerobes.        EVALUATION / RATIONALE FOR TREATMENT:    12/11/2020 POD 23 Length of wounds has decreased, width has not changed.  Odor persists.  Pt continues to become angry with VAC when it limits his mobility.    12/7/2020 POD 19 odor persists, improved wound bed after debridement.  Possible bacterial vs yeast vs fungal infection.  Culture taken.  Nystatin to treat possible fungal infection, silver foam to decrease microbial population.    12/4/2020 POD# 16 odor worsening, more yellow tissue present, will add nystatin and silver foam next week and consider a culture  11/30 POD#11 granular buds visible to wound bed.  Same as prior.   11/27 POD#8 wound is odorous likely related to biologic dressing.  No overt signs of infection.  Granular buds are visible through adaptic.  Edges do not appear as thick as they were prior to last sx.  Continue with current treatment.    11/23: POD#4 s/p I&D of left LE wounds and biologic placed by Dr. Olvera on 11/19.  Wound bed is flatter and raised edges scar tissue seems to be gone.   11/14 Posterior thigh aspect of  wound deteriorating, will increase frequency of treatment to keep biofilm down and hopefully avoid systemic abx.  Will include thigh in compression wrap.  Will consider culture if improvement is not seen in the next few treatments.    11/10: APRN unavailable at this time.  Will re-schedule excisional debridement to next week.   11/5: Plan for debridement of scarred edges on Tues by LPS. Tendon exposed to posterior aspect of wound, behind knee. Continue with current dressing care.  10/29: Excisional debridement by Asaf ELLER of scar tissue along the proximal edge of the posterior knee and lateral thigh.  Lateral aspect of thigh is flatten.  Medial aspect of knee has thick scar tissue that was excisionally debrided by Asaf ELLER.  Fully granulated throughout the wound bed.   10/23 wound bed mostly granular, superficial in depth, progress has been slow with the wound VAC so will trial collagen product to encourage new tissue growth.    10/19: wound bed has improved in color and is fully granulated.  Addison-wound has improved, denudation has resolved. APRN continuing with serial weekly debridements as needed.   10/16: wound friable pt now on iv abx per ID.  Indurated edges addressed with drape and foam.  Addison wound likely has yeast infection - addressing with silver powder crusting  10/14: patient seen with Asaf ELLER, stopping veraflo instillation.  Starting silver powder and regular wound VAC to see if it will help with bioburden.  Possible colonization of bacteria.  ID involved.   10/12: Inflammation noted to the proximal part of the wound bed.  Will continue to monitor, possible vac break on Wednesday to help addison-skin inflammation.   10/7: Excisional debridement performed by Asaf ELLER. Will need to continue selective debridement with NPWT changes, and weekly excisional debridement with APRN to flatten out the scar tissue.  IV abx therapy ended 10/5.   10/5: Lateral wound still with  some slough, both wounds smaller per measurements. Medial wound with all granular tissue.  9/30: Patient to start abx therapy for 7 days course per Dr. Ellis.  Started dakin's instillation to veraflo  9/28: malodorous today, culture taken.    9/25: Odor noted, though unclear if from wound or pt, consider shower before next Vac change. Consider regular vac next change, wound granular and superficial.   9/23: Patient still awaiting guardianship for placement.   9/18: wound granulating nicely and responding well to current treatment.    9/16: Wound bed with granular tissue, edges are thick but appear better than previous assessments. . NPWT VF to encourage closure by secondary intention and manage drainage while encouraging oxygenation and granulation to the wound bed. 2 layer compression to assist in decrease of swelling and encourage blood flow to wounds. Wound team to follow up and change 3x/week.      Goals: Steady decrease in wound area and depth weekly.    NURSING PLAN OF CARE ORDERS (X):    Dressing changes: See Dressing Care orders: X  Skin care: See Skin Care orders:   Rectal tube care: See Rectal Tube Care orders:   Other orders:      WOUND TEAM PLAN OF CARE:   Dressing changes by wound team:        Follow up 3 times weekly:                NPWT change 3 times weekly:  X,  NPWT changes 3x/ weekly with 2 layer compression wrap.   Follow up 1-2 times weekly:      Follow up Bi-Monthly:                   Follow up as needed:       Other (explain):     Anticipated discharge plans:  LTACH:        SNF/Rehab: X - patient will need ongoing wound care for LLE upon discharge - 3x/weekly           Home Health Care:           Outpatient Wound Center:            Self Care:

## 2020-12-11 NOTE — CARE PLAN
Problem: Discharge Barriers/Planning  Goal: Patient's continuum of care needs will be met  Outcome: PROGRESSING AS EXPECTED  Note: Guardianship hearing scheduled for Jan 19.      Problem: Psychosocial Needs:  Goal: Level of anxiety will decrease  Outcome: PROGRESSING AS EXPECTED  Note: Pt was calm and agreeable this evening

## 2020-12-11 NOTE — PROGRESS NOTES
Pharmacy Pharmacotherapy Consult for LOS >30 days    Admit Date: 8/7/2020      Medications were reviewed for appropriateness and ongoing need.     Current Facility-Administered Medications   Medication Dose Route Frequency Provider Last Rate Last Admin   • nystatin (MYCOSTATIN) powder   Topical MO, WE + FR Cassia Ohara, A.P.R.N.   Stopped at 12/09/20 0900   • ondansetron (ZOFRAN) syringe/vial injection 4 mg  4 mg Intravenous Q4HRS PRN Debra Perkins M.D.       • enoxaparin (LOVENOX) inj 40 mg  40 mg Subcutaneous DAILY Debra Perkins M.D.   40 mg at 12/11/20 0418   • oxyCODONE immediate release (ROXICODONE) tablet 10 mg  10 mg Oral Q6HRS PRN Jeovanny Andres, A.P.R.N.   10 mg at 12/11/20 0914   • gabapentin (NEURONTIN) capsule 200 mg  200 mg Oral TID Gissellepalomo Bowensson, A.P.R.N.   200 mg at 12/11/20 0416   • cyclobenzaprine (Flexeril) tablet 10 mg  10 mg Oral TID PRN Gisselle Bowensson, A.P.R.N.   10 mg at 12/10/20 1300   • lidocaine (LIDODERM) 5 % 1 Patch  1 Patch Transdermal Q24HR Gissellepalomo Bowensson, A.P.R.N.   1 Patch at 12/10/20 1301   • hydrOXYzine HCl (ATARAX) tablet 25 mg  25 mg Oral TID PRN Gisselle Bowensson, A.P.R.N.   25 mg at 12/09/20 2136   • senna-docusate (PERICOLACE or SENOKOT S) 8.6-50 MG per tablet 2 Tab  2 Tab Oral BID PRN Demarcus Mccabe, A.P.N.   2 Tab at 12/07/20 0632    And   • polyethylene glycol/lytes (MIRALAX) PACKET 1 Packet  1 Packet Oral QDAY PRN Demarcus Mccabe, A.P.N.   1 Packet at 11/09/20 1622   • risperiDONE (RISPERDAL) tablet 0.5 mg  0.5 mg Oral DAILY Demarcus Mccabe, A.P.N.   0.5 mg at 12/11/20 0417   • risperiDONE (RISPERDAL) tablet 1 mg  1 mg Oral Q EVENING Demarcus Mccabe, A.P.N.   1 mg at 12/10/20 1704   • haloperidol lactate (HALDOL) injection 2-5 mg  2-5 mg Intramuscular Q4HRS PRN Demarcus Mccabe, A.P.N.   5 mg at 11/05/20 1339   • divalproex (DEPAKOTE SPRINKLE) capsule 125 mg  125 mg Oral Q8HRS Jeovanny Andres, A.P.R.N.   125 mg at 12/11/20 0416   • magnesium oxide (MAG-OX) tablet 400 mg   400 mg Oral BID Jeovanny Andres A.P.R.N.   400 mg at 12/11/20 0417   • amLODIPine (NORVASC) tablet 5 mg  5 mg Oral Q DAY Demarcus Mccabe A.P.N.   5 mg at 12/11/20 0417       Recommendations:  · Consider discontinuing the following medications for no use in >2 wk:  · Haldol IM PRN  · Miralax PRN  · Zofran IV PRN  · Consider discontinuing magnesium oxide if the magnesium level comes back tomorrow and is WNL  · Would recommend decreasing oxycodone dose, as patient should not need to be on this long term and the dose has remained constant since 10/28. Could keep 10 mg for VAC/dressing changes only, and then reduce dose for all other indications to 5 mg or 7.5 mg    Sapna Buck, PharmD, BCPS

## 2020-12-11 NOTE — CARE PLAN
Problem: Knowledge Deficit  Goal: Knowledge of disease process/condition, treatment plan, diagnostic tests, and medications will improve  Outcome: PROGRESSING SLOWER THAN EXPECTED  Patient occasionally gets upset about having a wound vac & does not fully understand why he has one. Will continue to educate the patient on the purpose & importance of having it placed to help his current/chronic wound heal.    Problem: Discharge Barriers/Planning  Goal: Patient's continuum of care needs will be met  Outcome: PROGRESSING SLOWER THAN EXPECTED  Guardianship pending. Court hearing 1/29.

## 2020-12-12 LAB
ANION GAP SERPL CALC-SCNC: 9 MMOL/L (ref 7–16)
BUN SERPL-MCNC: 25 MG/DL (ref 8–22)
CALCIUM SERPL-MCNC: 9.2 MG/DL (ref 8.5–10.5)
CHLORIDE SERPL-SCNC: 98 MMOL/L (ref 96–112)
CO2 SERPL-SCNC: 28 MMOL/L (ref 20–33)
CREAT SERPL-MCNC: 0.91 MG/DL (ref 0.5–1.4)
GLUCOSE SERPL-MCNC: 93 MG/DL (ref 65–99)
MAGNESIUM SERPL-MCNC: 2.1 MG/DL (ref 1.5–2.5)
POTASSIUM SERPL-SCNC: 4.2 MMOL/L (ref 3.6–5.5)
SODIUM SERPL-SCNC: 135 MMOL/L (ref 135–145)

## 2020-12-12 PROCEDURE — A9270 NON-COVERED ITEM OR SERVICE: HCPCS | Performed by: NURSE PRACTITIONER

## 2020-12-12 PROCEDURE — 700102 HCHG RX REV CODE 250 W/ 637 OVERRIDE(OP): Performed by: NURSE PRACTITIONER

## 2020-12-12 PROCEDURE — 36415 COLL VENOUS BLD VENIPUNCTURE: CPT

## 2020-12-12 PROCEDURE — 80048 BASIC METABOLIC PNL TOTAL CA: CPT

## 2020-12-12 PROCEDURE — 700111 HCHG RX REV CODE 636 W/ 250 OVERRIDE (IP): Performed by: NURSE PRACTITIONER

## 2020-12-12 PROCEDURE — 83735 ASSAY OF MAGNESIUM: CPT

## 2020-12-12 PROCEDURE — 700101 HCHG RX REV CODE 250: Performed by: NURSE PRACTITIONER

## 2020-12-12 PROCEDURE — 770001 HCHG ROOM/CARE - MED/SURG/GYN PRIV*

## 2020-12-12 RX ADMIN — ENOXAPARIN SODIUM 40 MG: 40 INJECTION SUBCUTANEOUS at 04:33

## 2020-12-12 RX ADMIN — LIDOCAINE 1 PATCH: 50 PATCH CUTANEOUS at 12:30

## 2020-12-12 RX ADMIN — GABAPENTIN 200 MG: 100 CAPSULE ORAL at 12:30

## 2020-12-12 RX ADMIN — DIVALPROEX SODIUM 125 MG: 125 CAPSULE ORAL at 21:36

## 2020-12-12 RX ADMIN — AMLODIPINE BESYLATE 5 MG: 5 TABLET ORAL at 04:32

## 2020-12-12 RX ADMIN — DIVALPROEX SODIUM 125 MG: 125 CAPSULE ORAL at 12:30

## 2020-12-12 RX ADMIN — OXYCODONE HYDROCHLORIDE 10 MG: 10 TABLET ORAL at 12:30

## 2020-12-12 RX ADMIN — CYCLOBENZAPRINE 10 MG: 10 TABLET, FILM COATED ORAL at 04:33

## 2020-12-12 RX ADMIN — RISPERIDONE 0.5 MG: 0.5 TABLET ORAL at 04:32

## 2020-12-12 RX ADMIN — DIVALPROEX SODIUM 125 MG: 125 CAPSULE ORAL at 04:32

## 2020-12-12 RX ADMIN — Medication 400 MG: at 04:32

## 2020-12-12 RX ADMIN — CYCLOBENZAPRINE 10 MG: 10 TABLET, FILM COATED ORAL at 12:30

## 2020-12-12 RX ADMIN — GABAPENTIN 200 MG: 100 CAPSULE ORAL at 17:03

## 2020-12-12 RX ADMIN — RISPERIDONE 1 MG: 1 TABLET ORAL at 17:03

## 2020-12-12 RX ADMIN — OXYCODONE HYDROCHLORIDE 10 MG: 10 TABLET ORAL at 19:26

## 2020-12-12 RX ADMIN — CYCLOBENZAPRINE 10 MG: 10 TABLET, FILM COATED ORAL at 21:36

## 2020-12-12 RX ADMIN — GABAPENTIN 200 MG: 100 CAPSULE ORAL at 04:32

## 2020-12-12 ASSESSMENT — PAIN DESCRIPTION - PAIN TYPE
TYPE: SURGICAL PAIN
TYPE: CHRONIC PAIN

## 2020-12-13 PROCEDURE — 700102 HCHG RX REV CODE 250 W/ 637 OVERRIDE(OP): Performed by: NURSE PRACTITIONER

## 2020-12-13 PROCEDURE — A9270 NON-COVERED ITEM OR SERVICE: HCPCS | Performed by: NURSE PRACTITIONER

## 2020-12-13 PROCEDURE — 700111 HCHG RX REV CODE 636 W/ 250 OVERRIDE (IP): Performed by: NURSE PRACTITIONER

## 2020-12-13 PROCEDURE — 700101 HCHG RX REV CODE 250: Performed by: NURSE PRACTITIONER

## 2020-12-13 PROCEDURE — 770001 HCHG ROOM/CARE - MED/SURG/GYN PRIV*

## 2020-12-13 RX ADMIN — CYCLOBENZAPRINE 10 MG: 10 TABLET, FILM COATED ORAL at 20:13

## 2020-12-13 RX ADMIN — ENOXAPARIN SODIUM 40 MG: 40 INJECTION SUBCUTANEOUS at 05:21

## 2020-12-13 RX ADMIN — DIVALPROEX SODIUM 125 MG: 125 CAPSULE ORAL at 05:22

## 2020-12-13 RX ADMIN — GABAPENTIN 200 MG: 100 CAPSULE ORAL at 05:22

## 2020-12-13 RX ADMIN — LIDOCAINE 1 PATCH: 50 PATCH CUTANEOUS at 13:47

## 2020-12-13 RX ADMIN — OXYCODONE HYDROCHLORIDE 10 MG: 10 TABLET ORAL at 05:22

## 2020-12-13 RX ADMIN — OXYCODONE HYDROCHLORIDE 10 MG: 10 TABLET ORAL at 13:21

## 2020-12-13 RX ADMIN — DIVALPROEX SODIUM 125 MG: 125 CAPSULE ORAL at 13:21

## 2020-12-13 RX ADMIN — GABAPENTIN 200 MG: 100 CAPSULE ORAL at 13:21

## 2020-12-13 RX ADMIN — AMLODIPINE BESYLATE 5 MG: 5 TABLET ORAL at 05:22

## 2020-12-13 RX ADMIN — GABAPENTIN 200 MG: 100 CAPSULE ORAL at 18:30

## 2020-12-13 RX ADMIN — HYDROXYZINE HYDROCHLORIDE 25 MG: 50 TABLET, FILM COATED ORAL at 20:13

## 2020-12-13 RX ADMIN — OXYCODONE HYDROCHLORIDE 10 MG: 10 TABLET ORAL at 22:08

## 2020-12-13 RX ADMIN — DIVALPROEX SODIUM 125 MG: 125 CAPSULE ORAL at 20:12

## 2020-12-13 RX ADMIN — RISPERIDONE 0.5 MG: 0.5 TABLET ORAL at 05:22

## 2020-12-13 RX ADMIN — RISPERIDONE 1 MG: 1 TABLET ORAL at 18:30

## 2020-12-13 ASSESSMENT — PAIN DESCRIPTION - PAIN TYPE
TYPE: ACUTE PAIN
TYPE: SURGICAL PAIN
TYPE: ACUTE PAIN

## 2020-12-13 NOTE — WOUND TEAM
RN reports VAC not holding a charge - the unit would be plugged in for several hours then when unplugged would alarm low battery.  Pt becomes agitated when he can not move around with the IV pole that holds the VAC.  Changed wound charging cord earlier in the day and this did not make a difference.  Changed out VAC this evening.      Unfortunately I am unable to get the old VAC off the IV pole because clamp will not release.  RN said that she will get another IV pole and place new VAC on that and roll IV pole with dysfunctioning VAC down to central supply.

## 2020-12-13 NOTE — CARE PLAN
Problem: Pain Management  Goal: Pain level will decrease to patient's comfort goal  Outcome: PROGRESSING AS EXPECTED   Pain assessment done routinely. Educated patient with non-pharmacologic techniques in pain management. Receives Oxy Ir PRN for pain

## 2020-12-14 LAB
GRAM STN SPEC: NORMAL
SIGNIFICANT IND 70042: NORMAL
SITE SITE: NORMAL
SOURCE SOURCE: NORMAL

## 2020-12-14 PROCEDURE — 87186 SC STD MICRODIL/AGAR DIL: CPT

## 2020-12-14 PROCEDURE — A9270 NON-COVERED ITEM OR SERVICE: HCPCS | Performed by: NURSE PRACTITIONER

## 2020-12-14 PROCEDURE — 87205 SMEAR GRAM STAIN: CPT

## 2020-12-14 PROCEDURE — 87077 CULTURE AEROBIC IDENTIFY: CPT

## 2020-12-14 PROCEDURE — 700102 HCHG RX REV CODE 250 W/ 637 OVERRIDE(OP): Performed by: NURSE PRACTITIONER

## 2020-12-14 PROCEDURE — 700111 HCHG RX REV CODE 636 W/ 250 OVERRIDE (IP): Performed by: NURSE PRACTITIONER

## 2020-12-14 PROCEDURE — 770001 HCHG ROOM/CARE - MED/SURG/GYN PRIV*

## 2020-12-14 PROCEDURE — 99232 SBSQ HOSP IP/OBS MODERATE 35: CPT | Performed by: NURSE PRACTITIONER

## 2020-12-14 PROCEDURE — 97608 NEG PRS WND THER NDME>50SQCM: CPT

## 2020-12-14 PROCEDURE — 700101 HCHG RX REV CODE 250: Performed by: NURSE PRACTITIONER

## 2020-12-14 PROCEDURE — 87102 FUNGUS ISOLATION CULTURE: CPT

## 2020-12-14 PROCEDURE — 87070 CULTURE OTHR SPECIMN AEROBIC: CPT

## 2020-12-14 RX ADMIN — GABAPENTIN 200 MG: 100 CAPSULE ORAL at 17:05

## 2020-12-14 RX ADMIN — ENOXAPARIN SODIUM 40 MG: 40 INJECTION SUBCUTANEOUS at 06:28

## 2020-12-14 RX ADMIN — DIVALPROEX SODIUM 125 MG: 125 CAPSULE ORAL at 06:27

## 2020-12-14 RX ADMIN — DIVALPROEX SODIUM 125 MG: 125 CAPSULE ORAL at 22:51

## 2020-12-14 RX ADMIN — OXYCODONE HYDROCHLORIDE 10 MG: 10 TABLET ORAL at 06:27

## 2020-12-14 RX ADMIN — RISPERIDONE 1 MG: 1 TABLET ORAL at 17:05

## 2020-12-14 RX ADMIN — OXYCODONE HYDROCHLORIDE 10 MG: 10 TABLET ORAL at 13:07

## 2020-12-14 RX ADMIN — AMLODIPINE BESYLATE 5 MG: 5 TABLET ORAL at 06:27

## 2020-12-14 RX ADMIN — GABAPENTIN 200 MG: 100 CAPSULE ORAL at 06:27

## 2020-12-14 RX ADMIN — DIVALPROEX SODIUM 125 MG: 125 CAPSULE ORAL at 13:07

## 2020-12-14 RX ADMIN — RISPERIDONE 0.5 MG: 0.5 TABLET ORAL at 06:27

## 2020-12-14 RX ADMIN — GABAPENTIN 200 MG: 100 CAPSULE ORAL at 13:07

## 2020-12-14 RX ADMIN — LIDOCAINE 1 PATCH: 50 PATCH CUTANEOUS at 13:07

## 2020-12-14 ASSESSMENT — PAIN DESCRIPTION - PAIN TYPE
TYPE: ACUTE PAIN

## 2020-12-14 NOTE — CARE PLAN
Problem: Pain Management  Goal: Pain level will decrease to patient's comfort goal  Outcome: PROGRESSING AS EXPECTED   Pain assessment done routinely. Educated patient with non-pharmacologic techniques in pain management. Receives Oxy IR for pain as needed     Problem: Discharge Barriers/Planning  Goal: Patient's continuum of care needs will be met  Outcome: PROGRESSING SLOWER THAN EXPECTED   Pending guardianship   Court date scheduled 1/29/2021

## 2020-12-14 NOTE — PROGRESS NOTES
"WOUND CARE PROVIDER PROGRESS NOTE        HPI: 65-year-old male homelessness, EtOH use, tobacco dependence, chronic left lower extremity wound admitted 8/7/2020 with left lower extremity wound infection.  Patient was recently hospitalized at [facility] in April 2020, evaluated by orthopedic surgery who recommended wound care.  Wound culture grew MSSA and strep.  Patient was recently seen at [facility] prior to this admission was given a prescription for antibiotics but did not fill this.  Patient reports that he has had this wound for 8 to 10 years.  He reports that he fell and went \"ass over tea kettle\".  He reports that he would clean the wound almost daily with soap and water at the homeless shelter.  Per chart review, patient reported he developed the ulcer in May 2018 when he fell while hiking.  Patient was seen at wound care clinic in August 2018.  Patient had a  assisting him while he was living at well [facility] housing.  Wound VAC /was ordered however  had concerns with patient's compliancy and/ access to medical care.  Skilled nursing facility was contacted to have patient be admitted, however he was not accepted due to Medicaid.    Not on any blood thinners.  Patient ambulatory in [place].  Allergies reviewed.  He denies any allergies.    Surgery date: 11/19/2020 by Dr. Olvera  Procedure:1.  Irrigation and debridement of multiple compartments of the left leg with 2   separate 16 cm x 5 cm superficial ulcerations in posterior knee and calf.  2.  AmnioFix biologic sheet placement, left leg.  3.  Wound VAC placement, left leg, 16x5 + 16x5 cm.          Interval hx:   9/11/2020: pt calm cooperative. On zyvox. Seen during VAC and two layer compression wrap change. Pt tolerating VAC and wraps. Wound decreased in size.   9/18/2020: Patient denies any fevers, chills, nausea, vomiting.  He is tolerating the veraflo wound VAC and 2 layer compression wrap.  Wound is decreasing in size.  Increased " granular tissue noted, wound edges have improved however he does have rolled edges to popliteal fossa area.  Patient calm and cooperative, watching sports.  9/30/2020: Denies fevers, chills, nausea, vomiting.  Tolerating wound VAC and 2 layer compression wrap.  Refused nail care.  Wound culture positive for strep A and Proteus.  10/7/2020: completed 5 day course of zyvox. Denies fevers chills nausea vomiting.  Seen during veraflo VAC and 2 layer compression wrap change.    10/14/2020: Patient denies fevers, chills, nausea, vomiting.  Patient wound is malodorous complaining of increased pain to the wound.  Increased slough noted to wound bed despite veraflo wound VAC.  Reconsult ID.  10/16/2020: Patient seen during wound VAC change with Miranda wound care PT. patient denies fever, chills, nausea, vomiting.  Patient reports pain to wound and increase in pain with wound VAC change.  Patient has granular tissue throughout wound with mild amount of slough to posterior aspect of leg.  Malodorous with VAC removal.  Wound VAC nursing changed.  10/29/2020: Wound VAC was discontinued by wound team on 10/23/2020 due to slow progress and collagen was started.  Patient has completed 7-day antibiotic course of meropenem for multidrug resistant Proteus vulgaris.  Patient found to have lice and has been treated.  Nonfoul-smelling odor present with dressing removal.  11/5/2020: Seen with wound team during 2 layer compression dressing change and for excisional debridement.  No odor noted today.  Thick layer of biofilm noted.  Wound edges continue to improve but still remain to medial aspect of wound.  11/17/2020: Seen with wound team during dressing and 2 layer compression wrap change.  Patient with severely thick scar tissue to wound edges.  Excisional debridement with injectable lidocaine and epinephrine performed today.  Patient denies any fevers, chills, nausea, vomiting.  11/23/2020: POD #4 SP left leg I&D with biologic and VAC  placement.  VAC functioning.  Foul odor coming from wound.  11/30/2020: POD #11.  Patient tired of walking around with wound VAC machine.  But agreeable to continue with wound VAC.  Mild odor coming from wound with wound VAC removal.  12/7/2020: POD #18.  Seen during wound VAC change.  Sitter no longer at bedside.  12/14/2020 POD # 25.  Seen during wound VAC change with wound team.  Previous wound culture from last week showed organisms but they were not worked up.  New wound culture to be taken today.  Odor remains slightly diminished.  Drainage heavy, slightly decreased from last week.  Continue with nystatin powder and silver foam.  Patient tired today.      Results:  Wound culture left leg 12/7/2020:Light growth mixed organisms.   Covid screen not detected 11/17/2020  CBC 11/22/2020: WBC 6.5, hemoglobin and hematocrit 9.9/30.5.  Platelet count 403.  CBC with differential 10/14/2020: WBC 6, H&H 11/33.9  Wound culture: 9/28/2020: Positive for beta-hemolytic strep group A, Proteus.    Wound cx: 9/1/2020 mrsa, diphtheroids, beta hemolytic strep group A    8/7/2020: X-ray tib-fib left:  1.  Healed distal metaphyseal fractures of the left fibula and tibia with tibial IM layla in place.     2.  No acute fracture or dislocation.     3.  No radiopaque soft tissue foreign body.      Arterial studies:   9/2/2020  Right.    Doppler waveforms of the common femoral artery are of high amplitude and    triphasic.    Doppler waveforms at the ankle are brisk and triphasic.    Ankle-brachial index is normal.       Left.    Doppler waveforms of the common femoral artery are of high amplitude and    triphasic.    Doppler waveforms at the ankle are brisk and triphasic.    Ankle-brachial index is normal.    Unable to obtained popliteal waveforms due to wound bandages.          Exam:    Palpable PT pulse to left foot +1 foot   +2 DP left foot  Difficulty palpating popliteal due to scar tissue  Able to extend left leg to 160 degrees. Able  to flex left leg around 80 degrees      Left lower leg full-thickness ulcer  Odor remains with heavy drainage, slightly decrease in odor, slightly decreasing drainage  Skin bridge is no longer present and wound is now connected again at the popliteal fossa      Lateral ulcer:  Wound edges flat, semi-open   Granular tissue noted, with scattered 40%/slough  Amnio fix skin substitute  incorporated  No periwound erythema  Denuded periwound lateral aspect resolved with Dermatac drape  Edema controlled    Medial ulcer:  Fissure to distal aspect of Periwound has resolved and skin is intact.  Wound edges  open except for edges at 12:00   granular tissue with scattered 40% slough  Amnio fix  incorporated  Edema controlled        Photos  left leg lateral posterior view  From 12/7/20                   Medial view left leg  From 12/11/20                      ASSESSMENT/PLAN:  65-year-old male with homelessness, EtOH use, tobacco use, and chronic left leg ulcer.    POD #25 SP left leg I&D with amnio fix and wound VAC placement by Dr. Olvera  Wound edges open starting to close at proximal aspect.   granular tissue with scattered 40% slough.  Anticipate debridement at next change.  Foul odor remains, heavy drainage remains, slightly decreased.   -New wound culture taken by wound team, follow as previous culture organisms were not completely worked up.  Micro notified to evaluate all organisms by pharmacist Zachary CORREA  Discussed with Dr. Ellis from ID.  -Continue with wound VAC, nystatin powder, silver foam for continuous antimicrobial effect.  Hold off on ACell application at this time until wound culture results are back.   Will continue to monitor and continue dressing frequency at 3 times per week.        PLAN  -Continue 3 times a week dressing changes.  This will better control drainage and odor.  -Follow new wound culture.  Completed antibiotic course on 10/22/2020.   -Continue silver foam and nystatin powder  -Continue 2  layer compression wrap extending to thigh  - Patient to continue to be seen by wound care team while admitted  Patient to continue to be followed by wound care nurse practitioner       Discharge plan:  Pending guardianship      D/W: Patient, RN, Sera Simons RN wound care, Dr. Ellis

## 2020-12-14 NOTE — WOUND TEAM
[facility]  Inpatient Services  Wound and Skin Care Progress Note    Admission Date: 8/7/2020     Last order of IP CONSULT TO WOUND CARE was found on 9/1/2020 from Hospital Encounter on 8/7/2020     HPI, PMH, SH: Reviewed    Unit where seen by Wound Team: [id number]    WOUND CONSULT/FOLLOW UP RELATED TO:  Left leg follow-up    Self Report / Pain Level: pt slept through most of wound care  OBJECTIVE:  2 layer wrap intact, patient walking in room with vac on pole.    WOUND TYPE, LOCATION, CHARACTERISTICS (Pressure Injuries: location, stage, POA or date identified)     Negative Pressure Wound Therapy 11/20/20 Leg Lateral;Posterior;Medial Left (Active)   NPWT Pump Mode / Pressure Setting Ulta;Continuous;125 mmHg 12/14/20 1400   Dressing Type Medium;Silver impregnated foam 12/14/20 1400   Number of Foam Pieces Used 3 12/14/20 1400   Canister Changed No 12/14/20 1400   Output (mL) 50 mL 12/06/20 0603   NEXT Dressing Change/Treatment Date 12/16/20 12/14/20 1400   WOUND NURSE ONLY - Time Spent with Patient (mins) 100 12/14/20 1400         Wound 11/19/20 Full Thickness Wound Leg Lateral;Posterior;Medial Left (Active)   Wound Image   12/11/20 1016   Site Assessment Red;Pink;Slough 12/14/20 1400   Periwound Assessment Clean;Dry;Intact 12/14/20 1400   Margins Defined edges;Attached edges 12/14/20 1400   Closure Secondary intention 12/14/20 1400   Drainage Amount Large 12/14/20 1400   Drainage Description Serosanguineous 12/14/20 1400   Treatments Cleansed;Irrigation;Site care 12/14/20 1400   Wound Cleansing Approved Wound Cleanser 12/14/20 1400   Periwound Protectant Benzoin;Drape 12/14/20 1400   Dressing Cleansing/Solutions Not Applicable 12/14/20 1400   Dressing Options Wound Vac 12/14/20 1400   Dressing Changed Changed 12/14/20 1400   Dressing Status Clean;Dry;Intact 12/14/20 1400   Dressing Change/Treatment Frequency By Wound Team Only 12/14/20 1400   NEXT Dressing Change/Treatment Date 12/16/20 12/14/20 1400    NEXT Weekly Photo (Inpatient Only) 20 1400   Non-staged Wound Description Full thickness 20 1016   Wound Bed Granulation (%) 100 % 20 1016   Wound Bed Slough (%) 40 % 20 1300   Wound Bed Granulation (%) - Post-Procedure 100 % 20 1300   Shape linear 20 1016   Wound Odor Foul 20 1400   Pulses Left;2+;DP;PT 20 1400   Exposed Structures None 20 1400   WOUND NURSE ONLY - Time Spent with Patient (mins) 100 20 1400      Wounds are connected however being measured as 2 wounds - posterior thigh and medial leg.  Posterior thigh measurements include superior portion down to the narrowest part of wound by knee crease.      VASCULAR    CESAR:   CESAR Results, Last 30 Days Us-cesar Single Level Bilat    Result Date: 2020  Narrative  Vascular Laboratory  Conclusions  1.  Normal bilateral CESAR's.  GRUPO GANN  Age:    64    Gender:     M  MRN:    [id number]  :    1955      BSA:  Exam Date:     2020 09:59  Room #:     Inpatient  Priority:     Routine  Ht (in):             Wt (lb):  Ordering Physician:        EDDA CANADA  Referring Physician:       [id number] Morrissey  Sonographer:               Deja Ellis RDMS                             T  Study Type:                Complete Bilateral  Technical Quality:         Adequate  Indications:     Ulceration of LE  CPT Codes:       28129  ICD Codes:       707.1  History:         Nonhealing wound of left lower extremity.  Limitations:                 RIGHT  Waveform            Systolic BPs (mmHg)                             117           Brachial  Triphasic                                Common Femoral  Triphasic                  138           Posterior Tibial  Triphasic                  132           Dorsalis Pedis                                           Peroneal                             1.18          CESAR                                            TBI                       LEFT  Waveform        Systolic BPs (mmHg)                             114           Brachial  Triphasic                                Common Femoral  Triphasic                  127           Posterior Tibial  Triphasic                  122           Dorsalis Pedis                                           Peroneal                             1.09          KOSTAS                                           TBI  Findings  Right.  Doppler waveforms of the common femoral artery are of high amplitude and  triphasic.  Doppler waveforms at the ankle are brisk and triphasic.  Ankle-brachial index is normal.  Left.  Doppler waveforms of the common femoral artery are of high amplitude and  triphasic.  Doppler waveforms at the ankle are brisk and triphasic.  Ankle-brachial index is normal.  Unable to obtained popliteal waveforms due to wound bandages.  Additional testing was not performed in accordance with lower extremity  arterial evaluation protocol.  Clover Maya  (Electronically Signed)  Final Date:      02 September 2020                   11:14    Lab Values:    Lab Results   Component Value Date/Time    WBC 6.5 11/22/2020 02:44 AM    RBC 3.54 (L) 11/22/2020 02:44 AM    HEMOGLOBIN 9.9 (L) 11/22/2020 02:44 AM    HEMATOCRIT 30.5 (L) 11/22/2020 02:44 AM    CREACTPROT 1.07 (H) 08/12/2020 01:55 PM    SEDRATEWES 85 (H) 08/07/2020 01:20 PM    HBA1C 5.7 (H) 11/09/2018 06:30 PM        Culture Results show:  Recent Results (from the past 720 hour(s))   CULTURE WOUND W/ GRAM STAIN    Collection Time: 09/01/20  2:00 PM    Specimen: Left Leg; Wound   Result Value Ref Range    Significant Indicator POS (POS)     Source WND     Site LEFT LEG     Culture Result - (A)     Gram Stain Result       Moderate Gram positive cocci.  Moderate Gram positive rods.      Culture Result (A)      Beta Hemolytic Streptococcus group A  Moderate growth      Culture Result (A)      Methicillin Resistant  Staphylococcus aureus  Moderate growth      Culture Result (A)      Diphtheroids  Moderate growth  Mixed morphologies.         INTERVENTIONS BY WOUND TEAM:    Dressings removed cleansed wound bed with wound cleanser.  Meredith-wound cleansed with wound cleanser and patted dry.    Meredith-wound skin protected with benzoin and dermatac drape.  Nystatin to wound bed, Silver foam to all of wound bed, secured with regular drape.  TRAC pad placed.  NPWT resumed, no leaks noted.  mepilex to lateral leg wound near fibular head.  Fan folded foam padding behind knee crease, and 2 layer compression wrap to the left leg and thigh.         Interdisciplinary consultation: Patient, Bedside RN, Asaf ELLER    EVALUATION / RATIONALE FOR TREATMENT:    12/14/2020: re-cultured wound bed.  12/11/2020 POD 23 Length of wounds has decreased, width has not changed.  Odor persists.  Pt continues to become angry with VAC when it limits his mobility.    12/7/2020 POD 19 odor persists, improved wound bed after debridement.  Possible bacterial vs yeast vs fungal infection.  Culture taken.  Nystatin to treat possible fungal infection, silver foam to decrease microbial population.    12/4/2020 POD# 16 odor worsening, more yellow tissue present, will add nystatin and silver foam next week and consider a culture  11/30 POD#11 granular buds visible to wound bed.  Same as prior.   11/27 POD#8 wound is odorous likely related to biologic dressing.  No overt signs of infection.  Granular buds are visible through adaptic.  Edges do not appear as thick as they were prior to last sx.  Continue with current treatment.    11/23: POD#4 s/p I&D of left LE wounds and biologic placed by Dr. Olvera on 11/19.  Wound bed is flatter and raised edges scar tissue seems to be gone.   11/14 Posterior thigh aspect of wound deteriorating, will increase frequency of treatment to keep biofilm down and hopefully avoid systemic abx.  Will include thigh in compression wrap.   Will consider culture if improvement is not seen in the next few treatments.    11/10: APRN unavailable at this time.  Will re-schedule excisional debridement to next week.   11/5: Plan for debridement of scarred edges on Tues by LPS. Tendon exposed to posterior aspect of wound, behind knee. Continue with current dressing care.  10/29: Excisional debridement by Asaf ELLER of scar tissue along the proximal edge of the posterior knee and lateral thigh.  Lateral aspect of thigh is flatten.  Medial aspect of knee has thick scar tissue that was excisionally debrided by Asaf ELLER.  Fully granulated throughout the wound bed.   10/23 wound bed mostly granular, superficial in depth, progress has been slow with the wound VAC so will trial collagen product to encourage new tissue growth.    10/19: wound bed has improved in color and is fully granulated.  Addison-wound has improved, denudation has resolved. APRN continuing with serial weekly debridements as needed.   10/16: wound friable pt now on iv abx per ID.  Indurated edges addressed with drape and foam.  Addison wound likely has yeast infection - addressing with silver powder crusting  10/14: patient seen with Asaf ELLER, stopping veraflo instillation.  Starting silver powder and regular wound VAC to see if it will help with bioburden.  Possible colonization of bacteria.  ID involved.   10/12: Inflammation noted to the proximal part of the wound bed.  Will continue to monitor, possible vac break on Wednesday to help addison-skin inflammation.   10/7: Excisional debridement performed by Asaf ELLER. Will need to continue selective debridement with NPWT changes, and weekly excisional debridement with APRN to flatten out the scar tissue.  IV abx therapy ended 10/5.   10/5: Lateral wound still with some slough, both wounds smaller per measurements. Medial wound with all granular tissue.  9/30: Patient to start abx therapy for 7 days course per   Caleb.  Started dakin's instillation to veraflo  9/28: malodorous today, culture taken.    9/25: Odor noted, though unclear if from wound or pt, consider shower before next Vac change. Consider regular vac next change, wound granular and superficial.   9/23: Patient still awaiting guardianship for placement.   9/18: wound granulating nicely and responding well to current treatment.    9/16: Wound bed with granular tissue, edges are thick but appear better than previous assessments. . NPWT VF to encourage closure by secondary intention and manage drainage while encouraging oxygenation and granulation to the wound bed. 2 layer compression to assist in decrease of swelling and encourage blood flow to wounds. Wound team to follow up and change 3x/week.      Goals: Steady decrease in wound area and depth weekly.    NURSING PLAN OF CARE ORDERS (X):    Dressing changes: See Dressing Care orders: X  Skin care: See Skin Care orders:   Rectal tube care: See Rectal Tube Care orders:   Other orders:      WOUND TEAM PLAN OF CARE:   Dressing changes by wound team:        Follow up 3 times weekly:                NPWT change 3 times weekly:  X,  NPWT changes 3x/ weekly with 2 layer compression wrap.   Follow up 1-2 times weekly:      Follow up Bi-Monthly:                   Follow up as needed:       Other (explain):     Anticipated discharge plans:  LTACH:        SNF/Rehab: X - patient will need ongoing wound care for LLE upon discharge - 3x/weekly           Home Health Care:           Outpatient Wound Center:            Self Care:

## 2020-12-15 PROCEDURE — 700111 HCHG RX REV CODE 636 W/ 250 OVERRIDE (IP): Performed by: NURSE PRACTITIONER

## 2020-12-15 PROCEDURE — 700101 HCHG RX REV CODE 250: Performed by: NURSE PRACTITIONER

## 2020-12-15 PROCEDURE — 770001 HCHG ROOM/CARE - MED/SURG/GYN PRIV*

## 2020-12-15 PROCEDURE — 700102 HCHG RX REV CODE 250 W/ 637 OVERRIDE(OP): Performed by: NURSE PRACTITIONER

## 2020-12-15 PROCEDURE — A9270 NON-COVERED ITEM OR SERVICE: HCPCS | Performed by: NURSE PRACTITIONER

## 2020-12-15 RX ADMIN — ENOXAPARIN SODIUM 40 MG: 40 INJECTION SUBCUTANEOUS at 04:52

## 2020-12-15 RX ADMIN — DIVALPROEX SODIUM 125 MG: 125 CAPSULE ORAL at 04:52

## 2020-12-15 RX ADMIN — OXYCODONE HYDROCHLORIDE 10 MG: 10 TABLET ORAL at 16:24

## 2020-12-15 RX ADMIN — LIDOCAINE 1 PATCH: 50 PATCH CUTANEOUS at 11:18

## 2020-12-15 RX ADMIN — DIVALPROEX SODIUM 125 MG: 125 CAPSULE ORAL at 20:19

## 2020-12-15 RX ADMIN — DIVALPROEX SODIUM 125 MG: 125 CAPSULE ORAL at 13:44

## 2020-12-15 RX ADMIN — GABAPENTIN 200 MG: 100 CAPSULE ORAL at 04:52

## 2020-12-15 RX ADMIN — RISPERIDONE 1 MG: 1 TABLET ORAL at 17:39

## 2020-12-15 RX ADMIN — AMLODIPINE BESYLATE 5 MG: 5 TABLET ORAL at 04:52

## 2020-12-15 RX ADMIN — GABAPENTIN 200 MG: 100 CAPSULE ORAL at 17:39

## 2020-12-15 RX ADMIN — GABAPENTIN 200 MG: 100 CAPSULE ORAL at 11:18

## 2020-12-15 RX ADMIN — RISPERIDONE 0.5 MG: 0.5 TABLET ORAL at 04:52

## 2020-12-15 RX ADMIN — OXYCODONE HYDROCHLORIDE 10 MG: 10 TABLET ORAL at 22:25

## 2020-12-15 ASSESSMENT — PAIN DESCRIPTION - PAIN TYPE: TYPE: SURGICAL PAIN

## 2020-12-15 NOTE — PROGRESS NOTES
Bedside report received. Assessment completed.  Pt is A&O x2; disoriented to time and event. Pt on room air.   Medicating for pain PRN per MAR Pain 5/10  Denies nausea.   - numbness, - tingling.  Wound vac to LLE    Last BM (charted) 12/11/20 . +flatus,   +void.  Regular diet. Tolerates well.   Pt up self.  Call light and belongings within reach. All needs met at this time. Fall Precautions and hourly rounding in place.

## 2020-12-16 PROCEDURE — A9270 NON-COVERED ITEM OR SERVICE: HCPCS | Performed by: NURSE PRACTITIONER

## 2020-12-16 PROCEDURE — 700102 HCHG RX REV CODE 250 W/ 637 OVERRIDE(OP): Performed by: NURSE PRACTITIONER

## 2020-12-16 PROCEDURE — 700111 HCHG RX REV CODE 636 W/ 250 OVERRIDE (IP): Performed by: NURSE PRACTITIONER

## 2020-12-16 PROCEDURE — 700101 HCHG RX REV CODE 250: Performed by: NURSE PRACTITIONER

## 2020-12-16 PROCEDURE — 97606 NEG PRS WND THER DME>50 SQCM: CPT

## 2020-12-16 PROCEDURE — 99231 SBSQ HOSP IP/OBS SF/LOW 25: CPT | Performed by: NURSE PRACTITIONER

## 2020-12-16 PROCEDURE — 770001 HCHG ROOM/CARE - MED/SURG/GYN PRIV*

## 2020-12-16 RX ORDER — AMOXICILLIN AND CLAVULANATE POTASSIUM 875; 125 MG/1; MG/1
1 TABLET, FILM COATED ORAL EVERY 12 HOURS
Status: COMPLETED | OUTPATIENT
Start: 2020-12-16 | End: 2020-12-23

## 2020-12-16 RX ADMIN — RISPERIDONE 1 MG: 1 TABLET ORAL at 17:14

## 2020-12-16 RX ADMIN — CYCLOBENZAPRINE 10 MG: 10 TABLET, FILM COATED ORAL at 19:24

## 2020-12-16 RX ADMIN — RISPERIDONE 0.5 MG: 0.5 TABLET ORAL at 06:04

## 2020-12-16 RX ADMIN — GABAPENTIN 200 MG: 100 CAPSULE ORAL at 12:05

## 2020-12-16 RX ADMIN — DIVALPROEX SODIUM 125 MG: 125 CAPSULE ORAL at 13:48

## 2020-12-16 RX ADMIN — AMOXICILLIN AND CLAVULANATE POTASSIUM 1 TABLET: 875; 125 TABLET, FILM COATED ORAL at 17:14

## 2020-12-16 RX ADMIN — DIVALPROEX SODIUM 125 MG: 125 CAPSULE ORAL at 22:00

## 2020-12-16 RX ADMIN — OXYCODONE HYDROCHLORIDE 10 MG: 10 TABLET ORAL at 06:04

## 2020-12-16 RX ADMIN — ENOXAPARIN SODIUM 40 MG: 40 INJECTION SUBCUTANEOUS at 06:04

## 2020-12-16 RX ADMIN — LIDOCAINE 1 PATCH: 50 PATCH CUTANEOUS at 12:09

## 2020-12-16 RX ADMIN — AMLODIPINE BESYLATE 5 MG: 5 TABLET ORAL at 06:04

## 2020-12-16 RX ADMIN — OXYCODONE HYDROCHLORIDE 10 MG: 10 TABLET ORAL at 12:06

## 2020-12-16 RX ADMIN — HYDROXYZINE HYDROCHLORIDE 25 MG: 50 TABLET, FILM COATED ORAL at 19:24

## 2020-12-16 RX ADMIN — GABAPENTIN 200 MG: 100 CAPSULE ORAL at 06:04

## 2020-12-16 RX ADMIN — DIVALPROEX SODIUM 125 MG: 125 CAPSULE ORAL at 06:04

## 2020-12-16 RX ADMIN — GABAPENTIN 200 MG: 100 CAPSULE ORAL at 17:14

## 2020-12-16 RX ADMIN — OXYCODONE HYDROCHLORIDE 10 MG: 10 TABLET ORAL at 19:24

## 2020-12-16 ASSESSMENT — ENCOUNTER SYMPTOMS
HEADACHES: 0
DIZZINESS: 0
NAUSEA: 0
SHORTNESS OF BREATH: 0
COUGH: 0
FOCAL WEAKNESS: 0
DEPRESSION: 1
CONSTIPATION: 0
SENSORY CHANGE: 0
ABDOMINAL PAIN: 0
DIARRHEA: 0
INSOMNIA: 0
FALLS: 0
FEVER: 0
VOMITING: 0
PALPITATIONS: 0
WEAKNESS: 0
EYES NEGATIVE: 1
CHILLS: 0
SPEECH CHANGE: 0

## 2020-12-16 NOTE — PROGRESS NOTES
Hospital Medicine Twice Weekly Progress Note    Date of Service  12/16/2020    Chief Complaint  LLE wound    Hospital Course  Mr. Varela is a 65-year-old male who presented to the emergency department with a left lower extremity wound infection. He was hospitalized at our facility in April and evaluated by orthopedic surgery who recommended wound care. Wound cultures at that time grew MSSA and Streptococcus. He had been seen at [facility] ([facility] earlier that week and prescribed antibiotics, however he had not filled the prescription.  An ultrasound of that extremity was done and was negative for DVT.  He was treated for sepsis and completed an antibiotic course on 9/16/2020. He was also found to have head lice and underwent treatment for that.  A repeat wound culture was done on 9/28/2020 and grew Strep A and Proteus. Infectious disease was consulted and recommended a 5-day course of IV zosyn which was completed on 10/5/2020. On 10/12/2020 the wound care team noticed increased inflammation to the proximal part of the wound bed and switched from a vera flow wound VAC to a regular wound VAC.  ID was again consulted and on 10/14/2020 recommended to 7-day course of antibiotics with meropenem which was completed on 10/22/2020. Additionally, he was noted to have intermittent agitation so was started on risperdal, depakote, and gabapentin per psychiatric recommendations.  On 11/20/2020 he underwent left lower extremity debridement with wound VAC placement.  Since then he has remained stable.  He has been deemed incapacitated to make medical decisions and is currently pending guardianship (which he is contesting) and placement, likely to a group home.   Court date now scheduled for 1/29/2021 at 10 AM    Interval Problem Update  12/16- Patient resting in bed in no acute distress. States some pain to right foot but thinks it is related to the way he was sleeping. Wound care continues to follow for wound and vac  maintenance. Wound culture again positive showing proteus pan sensitive. Wound team to discuss with ID. Wound continues to drain and is low healing. Court date for guardianship in January.      Consultants/Specialty  -LPS  -ID  -Psychiatry    Code Status  Full Code    Disposition  Pending Guardianship     Review of Systems  Review of Systems   Constitutional: Negative for chills, fever and malaise/fatigue.   Eyes: Negative.    Respiratory: Negative for cough and shortness of breath.    Cardiovascular: Positive for leg swelling. Negative for chest pain and palpitations.   Gastrointestinal: Negative for abdominal pain, constipation, diarrhea, nausea and vomiting.   Genitourinary: Negative for dysuria, frequency and urgency.   Musculoskeletal: Negative for falls.        .   Skin: Negative for itching.   Neurological: Negative for dizziness, sensory change, speech change, focal weakness, weakness and headaches.   Psychiatric/Behavioral: Positive for depression (sometimes). The patient does not have insomnia. Nervous/anxious: sometimes.    All other systems reviewed and are negative.       Physical Exam  Temp:  [36.1 °C (96.9 °F)-36.9 °C (98.5 °F)] 36.7 °C (98.1 °F)  Pulse:  [69-79] 75  Resp:  [16-17] 17  BP: (100-131)/(65-83) 116/65  SpO2:  [94 %-100 %] 95 %    Physical Exam  Vitals signs and nursing note reviewed.   Constitutional:       General: He is sleeping. He is not in acute distress.     Appearance: He is underweight. He is ill-appearing. He is not toxic-appearing or diaphoretic.   HENT:      Head: Normocephalic and atraumatic.      Nose: Nose normal.      Mouth/Throat:      Lips: Pink.      Mouth: Mucous membranes are moist.   Eyes:      General: No scleral icterus.     Conjunctiva/sclera: Conjunctivae normal.   Neck:      Musculoskeletal: Normal range of motion.   Cardiovascular:      Rate and Rhythm: Normal rate.   Pulmonary:      Effort: Pulmonary effort is normal. No respiratory distress.   Abdominal:       General: There is no distension.      Tenderness: There is no abdominal tenderness.   Musculoskeletal:      Comments: GATES   Skin:     General: Skin is warm and dry.      Comments: Wound Vac intact/functioning appropriately to left leg    Neurological:      Mental Status: He is easily aroused. Mental status is at baseline.      Comments: Oriented to himself and hospital   Psychiatric:         Mood and Affect: Mood normal. Affect is labile.         Behavior: Behavior is not aggressive or combative. Behavior is cooperative.         Cognition and Memory: Cognition is impaired. He exhibits impaired recent memory.         Judgment: Judgment is impulsive.      Comments:            Fluids    Intake/Output Summary (Last 24 hours) at 12/16/2020 1333  Last data filed at 12/16/2020 0900  Gross per 24 hour   Intake 540 ml   Output --   Net 540 ml       Laboratory                        Imaging  IR-US GUIDED PIV   Final Result    Ultrasound-guided PERIPHERAL IV INSERTION performed by    qualified nursing staff as above.      IR-MIDLINE CATHETER INSERTION WO GUIDANCE > AGE 3   Final Result                  Ultrasound-guided midline placement performed by qualified nursing staff    as above.          IR-US GUIDED PIV   Final Result    Ultrasound-guided PERIPHERAL IV INSERTION performed by    qualified nursing staff as above.      IR-MIDLINE CATHETER INSERTION WO GUIDANCE > AGE 3   Final Result                  Ultrasound-guided midline placement performed by qualified nursing staff    as above.          US-KOSTAS SINGLE LEVEL BILAT   Final Result      CT-HEAD W/O   Final Result      No acute intracranial abnormality      DX-TIBIA AND FIBULA LEFT   Final Result      1.  Healed distal metaphyseal fractures of the left fibula and tibia with tibial IM layla in place.      2.  No acute fracture or dislocation.      3.  No radiopaque soft tissue foreign body.      DX-FEMUR-2+ LEFT   Final Result      1.  No radiographic evidence of acute  traumatic injury left femur.      2.  Unchanged cortical thickening in the posterior aspect of the distal femoral diaphysis which may represent sequela of prior injury or infection.      3.  No soft tissue foreign body identified.      US-EXTREMITY VENOUS LOWER UNILAT LEFT   Final Result      DX-CHEST-PORTABLE (1 VIEW)   Final Result      No acute cardiopulmonary abnormality.           Assessment/Plan  * Infection of wound due to methicillin resistant Staphylococcus aureus (MRSA)- (present on admission)  Assessment & Plan  -History of MRSA.  -Poor compliance with OP wound care. Poor compliance with wound vac at times.   -Continue pain control as needed.  -LPS and wound care following -debridements and wound VAC changes per their recommendations  -Cultures repeated 12/14- show positive for Proteus species, pan sensitive- Wound team to discuss with ID         Encephalopathy- (present on admission)  Assessment & Plan  -Acute on chronic, likely due Wernicke's. Improved.   -Per psychiatry and Dr. Velázquez: Patient is incapacitated.  -Guardianship and placement pending.  Patient is contesting guardianship, hearing is now scheduled for 1/29/2021 at 10 AM.    Psychiatric disorder- (present on admission)  Assessment & Plan  -Unspecified.  -Continue Risperdal and Depakote.  -Psychiatry has consulted and deemed him incapacitated to leave AMA or make medical decisions  -Pending guardianship, which he is contesting.    Essential hypertension- (present on admission)  Assessment & Plan  -Controlled on amlodipine    Flexion contracture of knee, 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)  Assessment & Plan  -Secondary to chronic wound in that area.  -PT/OT.  -Does not seem to limit his mobility.  Is frequently ambulatory.    Emphysema/COPD (HCC)- (present on admission)  Assessment & Plan  -Chronic and stable, without acute exacerbation.    Alcohol dependence (HCC)- (present on admission)  Assessment & Plan  -History of. Abstinent since  admission.    Protein malnutrition (HCC)- (present on admission)  Assessment & Plan  -Body mass index is 21.35 kg/m².   -Continue TID supplements.  -Encourage PO intake.    Tobacco dependence- (present on admission)  Assessment & Plan  -Abstinent since admission.       VTE prophylaxis: Ambulatory      I have performed a physical exam and reviewed and updated ROS and Assessment/Plan today 12/16/2020. In review of the previous note there are no changes except as documented above    I certify the patient requires continued medically necessary hospital services for the treatment of LLE wound requiring wound vac and intermittent antibiotic therapy.  The patient will remain in the hospital for the foreseeable future.  Discharge may or may not occur in the next 20 days due to ongoing discharge delays due to having no medically acceptable discharge options.        LOUISE Candelario.

## 2020-12-16 NOTE — PROGRESS NOTES
Bedside report received. Assessment completed.  Pt is A&O x2; disoriented to time and event. Pt on room air.   Medicating for pain PRN per MAR Pain 5/10  Denies nausea.   - numbness, - tingling.  Wound vac to LLE         Last BM 12/15/20 . +flatus,   +void.  Regular diet. Tolerates well.   Pt up self.  Call light and belongings within reach. All needs met at this time. Fall Precautions and hourly rounding in place.

## 2020-12-16 NOTE — WOUND TEAM
[facility]  Inpatient Services  Wound and Skin Care Progress Note    Admission Date: 8/7/2020     Last order of IP CONSULT TO WOUND CARE was found on 9/1/2020 from Hospital Encounter on 8/7/2020     HPI, PMH, SH: Reviewed    Unit where seen by Wound Team: [id number]    WOUND CONSULT/FOLLOW UP RELATED TO:  Left leg follow-up    Self Report / Pain Level: pt slept through most of wound care  OBJECTIVE:  2 layer wrap intact, patient walking in room with vac on pole.    WOUND TYPE, LOCATION, CHARACTERISTICS (Pressure Injuries: location, stage, POA or date identified)      Negative Pressure Wound Therapy 11/20/20 Leg Lateral;Posterior;Medial Left (Active)   NPWT Pump Mode / Pressure Setting Ulta;Continuous;125 mmHg 12/16/20 1100   Dressing Type Medium;Silver impregnated foam 12/16/20 1100   Number of Foam Pieces Used 3 12/16/20 1100   Canister Changed No 12/16/20 1100   Output (mL) 50 mL 12/06/20 0603   NEXT Dressing Change/Treatment Date 12/18/20 12/16/20 1100   WOUND NURSE ONLY - Time Spent with Patient (mins) 100 12/14/20 1400      Wound 11/19/20 Full Thickness Wound Leg Lateral;Posterior;Medial Left (Active)   Wound Image     12/16/20 1100   Site Assessment Pink;Red 12/16/20 1100   Periwound Assessment Scar tissue;Clean;Dry;Intact 12/16/20 1100   Margins Defined edges;Attached edges 12/16/20 1100   Closure Secondary intention 12/16/20 1100   Drainage Amount Large 12/16/20 1100   Drainage Description Serosanguineous 12/16/20 1100   Treatments Cleansed;CSWD - Conservative Sharp Wound Debridement;Irrigation;Site care;Compression 12/16/20 1100   Wound Cleansing Approved Wound Cleanser 12/16/20 1100   Periwound Protectant Skin Protectant Wipes to Periwound;Drape 12/16/20 1100   Dressing Cleansing/Solutions Not Applicable 12/16/20 1100   Dressing Options Wound Vac 12/16/20 1100   Dressing Changed Changed 12/16/20 1100   Dressing Status Clean;Dry;Intact 12/16/20 1100   Dressing Change/Treatment Frequency By Wound  Team Only 20 1100   NEXT Dressing Change/Treatment Date 20 1100   NEXT Weekly Photo (Inpatient Only) 20 1100   Non-staged Wound Description Full thickness 20 1100   Wound Bed Granulation (%) 100 % 20 1016   Wound Bed Slough (%) 40 % 20 1300   Wound Bed Granulation (%) - Post-Procedure 100 % 20 1300   Shape linear 20 1016   Wound Odor Foul 20 1400   Pulses Left;2+;DP;PT 20 1400   Exposed Structures None 20 1400   WOUND NURSE ONLY - Time Spent with Patient (mins) 100 20 1400               Wounds are connected however being measured as 2 wounds - posterior thigh and medial leg.  Posterior thigh measurements include superior portion down to the narrowest part of wound by knee crease.      VASCULAR    CESAR:   CESAR Results, Last 30 Days Us-cesar Single Level Bilat    Result Date: 2020  Narrative  Vascular Laboratory  Conclusions  1.  Normal bilateral CESAR's.  GRUPO GANN  Age:    64    Gender:     M  MRN:    [id number]  :    1955      BSA:  Exam Date:     2020 09:59  Room #:     Inpatient  Priority:     Routine  Ht (in):             Wt (lb):  Ordering Physician:        EDDA CANADA  Referring Physician:       [id number] Morrissey  Sonographer:               Deja Ellis RDMS                             RVT  Study Type:                Complete Bilateral  Technical Quality:         Adequate  Indications:     Ulceration of LE  CPT Codes:       12849  ICD Codes:       707.1  History:         Nonhealing wound of left lower extremity.  Limitations:                 RIGHT  Waveform            Systolic BPs (mmHg)                             117           Brachial  Triphasic                                Common Femoral  Triphasic                  138           Posterior Tibial  Triphasic                  132           Dorsalis Pedis                                            Peroneal                             1.18          KOSTAS                                           TBI                       LEFT  Waveform        Systolic BPs (mmHg)                             114           Brachial  Triphasic                                Common Femoral  Triphasic                  127           Posterior Tibial  Triphasic                  122           Dorsalis Pedis                                           Peroneal                             1.09          KOSTAS                                           TBI  Findings  Right.  Doppler waveforms of the common femoral artery are of high amplitude and  triphasic.  Doppler waveforms at the ankle are brisk and triphasic.  Ankle-brachial index is normal.  Left.  Doppler waveforms of the common femoral artery are of high amplitude and  triphasic.  Doppler waveforms at the ankle are brisk and triphasic.  Ankle-brachial index is normal.  Unable to obtained popliteal waveforms due to wound bandages.  Additional testing was not performed in accordance with lower extremity  arterial evaluation protocol.  Clover Maya  (Electronically Signed)  Final Date:      02 September 2020                   11:14    Lab Values:    Lab Results   Component Value Date/Time    WBC 6.5 11/22/2020 02:44 AM    RBC 3.54 (L) 11/22/2020 02:44 AM    HEMOGLOBIN 9.9 (L) 11/22/2020 02:44 AM    HEMATOCRIT 30.5 (L) 11/22/2020 02:44 AM    CREACTPROT 1.07 (H) 08/12/2020 01:55 PM    SEDRATEWES 85 (H) 08/07/2020 01:20 PM    HBA1C 5.7 (H) 11/09/2018 06:30 PM        Culture Results show:  Recent Results (from the past 720 hour(s))   CULTURE WOUND W/ GRAM STAIN    Collection Time: 09/01/20  2:00 PM    Specimen: Left Leg; Wound   Result Value Ref Range    Significant Indicator POS (POS)     Source WND     Site LEFT LEG     Culture Result - (A)     Gram Stain Result       Moderate Gram positive cocci.  Moderate Gram positive rods.      Culture Result (A)      Beta Hemolytic  Streptococcus group A  Moderate growth      Culture Result (A)      Methicillin Resistant Staphylococcus aureus  Moderate growth      Culture Result (A)      Diphtheroids  Moderate growth  Mixed morphologies.         INTERVENTIONS BY WOUND TEAM:    Dressings removed cleansed wound bed with wound cleanser.  Meredith-wound cleansed with wound cleanser and patted dry.    Meredith-wound skin protected with benzoin and dermatac drape.  CSWD with curette to remove non-viable slough from wound bed ~100cm2.  Nystatin to wound bed, Silver foam to all of wound bed, secured with regular drape.  TRAC pad placed.  NPWT resumed, no leaks noted.  mepilex to lateral leg wound near fibular head.  Fan folded foam padding behind knee crease, and 2 layer compression wrap to the left leg and thigh.   Increased NPWT to 150mmHg due to drainage.      Interdisciplinary consultation: Patient, Bedside RN    EVALUATION / RATIONALE FOR TREATMENT:    12/14/2020: re-cultured wound bed.  12/11/2020 POD 23 Length of wounds has decreased, width has not changed.  Odor persists.  Pt continues to become angry with VAC when it limits his mobility.    12/7/2020 POD 19 odor persists, improved wound bed after debridement.  Possible bacterial vs yeast vs fungal infection.  Culture taken.  Nystatin to treat possible fungal infection, silver foam to decrease microbial population.    12/4/2020 POD# 16 odor worsening, more yellow tissue present, will add nystatin and silver foam next week and consider a culture  11/30 POD#11 granular buds visible to wound bed.  Same as prior.   11/27 POD#8 wound is odorous likely related to biologic dressing.  No overt signs of infection.  Granular buds are visible through adaptic.  Edges do not appear as thick as they were prior to last sx.  Continue with current treatment.    11/23: POD#4 s/p I&D of left LE wounds and biologic placed by Dr. Olvera on 11/19.  Wound bed is flatter and raised edges scar tissue seems to be gone.   11/14  Posterior thigh aspect of wound deteriorating, will increase frequency of treatment to keep biofilm down and hopefully avoid systemic abx.  Will include thigh in compression wrap.  Will consider culture if improvement is not seen in the next few treatments.    11/10: APRN unavailable at this time.  Will re-schedule excisional debridement to next week.   11/5: Plan for debridement of scarred edges on Tues by LPS. Tendon exposed to posterior aspect of wound, behind knee. Continue with current dressing care.  10/29: Excisional debridement by Asaf ELLER of scar tissue along the proximal edge of the posterior knee and lateral thigh.  Lateral aspect of thigh is flatten.  Medial aspect of knee has thick scar tissue that was excisionally debrided by Asaf ELLER.  Fully granulated throughout the wound bed.   10/23 wound bed mostly granular, superficial in depth, progress has been slow with the wound VAC so will trial collagen product to encourage new tissue growth.    10/19: wound bed has improved in color and is fully granulated.  Addison-wound has improved, denudation has resolved. APRN continuing with serial weekly debridements as needed.   10/16: wound friable pt now on iv abx per ID.  Indurated edges addressed with drape and foam.  Addison wound likely has yeast infection - addressing with silver powder crusting  10/14: patient seen with Asaf ELLER, stopping veraflo instillation.  Starting silver powder and regular wound VAC to see if it will help with bioburden.  Possible colonization of bacteria.  ID involved.   10/12: Inflammation noted to the proximal part of the wound bed.  Will continue to monitor, possible vac break on Wednesday to help addison-skin inflammation.   10/7: Excisional debridement performed by Asaf ELLER. Will need to continue selective debridement with NPWT changes, and weekly excisional debridement with APRN to flatten out the scar tissue.  IV abx therapy ended 10/5.   10/5:  Lateral wound still with some slough, both wounds smaller per measurements. Medial wound with all granular tissue.  9/30: Patient to start abx therapy for 7 days course per Dr. Ellis.  Started dakin's instillation to veraflo  9/28: malodorous today, culture taken.    9/25: Odor noted, though unclear if from wound or pt, consider shower before next Vac change. Consider regular vac next change, wound granular and superficial.   9/23: Patient still awaiting guardianship for placement.   9/18: wound granulating nicely and responding well to current treatment.    9/16: Wound bed with granular tissue, edges are thick but appear better than previous assessments. . NPWT VF to encourage closure by secondary intention and manage drainage while encouraging oxygenation and granulation to the wound bed. 2 layer compression to assist in decrease of swelling and encourage blood flow to wounds. Wound team to follow up and change 3x/week.      Goals: Steady decrease in wound area and depth weekly.    NURSING PLAN OF CARE ORDERS (X):    Dressing changes: See Dressing Care orders: X  Skin care: See Skin Care orders:   Rectal tube care: See Rectal Tube Care orders:   Other orders:      WOUND TEAM PLAN OF CARE:   Dressing changes by wound team:        Follow up 3 times weekly:                NPWT change 3 times weekly:  X,  NPWT changes 3x/ weekly with 2 layer compression wrap.   Follow up 1-2 times weekly:      Follow up Bi-Monthly:                   Follow up as needed:       Other (explain):     Anticipated discharge plans:  LTACH:        SNF/Rehab: X - patient will need ongoing wound care for LLE upon discharge - 3x/weekly           Home Health Care:           Outpatient Wound Center:            Self Care:

## 2020-12-17 PROCEDURE — 700102 HCHG RX REV CODE 250 W/ 637 OVERRIDE(OP): Performed by: NURSE PRACTITIONER

## 2020-12-17 PROCEDURE — 700111 HCHG RX REV CODE 636 W/ 250 OVERRIDE (IP): Performed by: NURSE PRACTITIONER

## 2020-12-17 PROCEDURE — A9270 NON-COVERED ITEM OR SERVICE: HCPCS | Performed by: NURSE PRACTITIONER

## 2020-12-17 PROCEDURE — 770001 HCHG ROOM/CARE - MED/SURG/GYN PRIV*

## 2020-12-17 RX ADMIN — AMOXICILLIN AND CLAVULANATE POTASSIUM 1 TABLET: 875; 125 TABLET, FILM COATED ORAL at 04:44

## 2020-12-17 RX ADMIN — RISPERIDONE 0.5 MG: 0.5 TABLET ORAL at 04:44

## 2020-12-17 RX ADMIN — OXYCODONE HYDROCHLORIDE 10 MG: 10 TABLET ORAL at 16:57

## 2020-12-17 RX ADMIN — DIVALPROEX SODIUM 125 MG: 125 CAPSULE ORAL at 04:44

## 2020-12-17 RX ADMIN — AMOXICILLIN AND CLAVULANATE POTASSIUM 1 TABLET: 875; 125 TABLET, FILM COATED ORAL at 16:54

## 2020-12-17 RX ADMIN — ENOXAPARIN SODIUM 40 MG: 40 INJECTION SUBCUTANEOUS at 04:44

## 2020-12-17 RX ADMIN — RISPERIDONE 1 MG: 1 TABLET ORAL at 16:54

## 2020-12-17 RX ADMIN — AMLODIPINE BESYLATE 5 MG: 5 TABLET ORAL at 04:44

## 2020-12-17 RX ADMIN — CYCLOBENZAPRINE 10 MG: 10 TABLET, FILM COATED ORAL at 20:21

## 2020-12-17 RX ADMIN — DIVALPROEX SODIUM 125 MG: 125 CAPSULE ORAL at 20:21

## 2020-12-17 RX ADMIN — CYCLOBENZAPRINE 10 MG: 10 TABLET, FILM COATED ORAL at 01:18

## 2020-12-17 RX ADMIN — GABAPENTIN 200 MG: 100 CAPSULE ORAL at 16:54

## 2020-12-17 RX ADMIN — OXYCODONE HYDROCHLORIDE 10 MG: 10 TABLET ORAL at 01:18

## 2020-12-17 RX ADMIN — DIVALPROEX SODIUM 125 MG: 125 CAPSULE ORAL at 14:29

## 2020-12-17 RX ADMIN — GABAPENTIN 200 MG: 100 CAPSULE ORAL at 04:44

## 2020-12-17 ASSESSMENT — PAIN DESCRIPTION - PAIN TYPE: TYPE: SURGICAL PAIN

## 2020-12-17 NOTE — DIETARY
"NUTRITION SERVICES  Consult for pressure ulcer stage 2-4 or non-healing wound - \"Nonhealing wound to LLE . Please evaluate for L arginine supplement to be mixed in to food or drink. Pt is not drinking boosts, stock piled in room.\" Pt noted with full thickness left lateral, posterior, medial wound per wound team on 12/16. Pt has undergone extensive wound care during this admission since 12/14/2020. PO intake of meal trays is consistently % per flow sheets. Boost Plus supplements have been discontinued. Will send Arginaid supplements 4 times per day for 2 weeks. RN aware. Please continue to document PO consumed of meals and supplements on ADL flow sheet.    RD to continue monitoring weekly.  "

## 2020-12-17 NOTE — PROGRESS NOTES
Wound Care provider note:     Patient not drinking boost.  Has stockpile in room.  Started Augmentin yesterday  Contacted micro to review culture.  So far growing Proteus and diphtheroids.  Micro to assess for fungal component.    Plan  Dietary consult to evaluate for L-arginine supplements  Fungal culture  Continue with wound VAC changes, 2 layer compression wrap 3 times per week

## 2020-12-17 NOTE — PROGRESS NOTES
Wound Care provider note:     Wound culture 12/14/2020 + for Proteus Mirabilis.  Discussed case with ID, previously following this admit.  Recommended Augmentin for 1 week.  BUN/creatinine 25/0.91 on 12/12/2020    Augmentin 875/125 twice daily ordered for 7 days.    D/W: Patient, Charo Connor hospitalist RAFITA, Dr. Ellis

## 2020-12-17 NOTE — CARE PLAN
Problem: Knowledge Deficit  Goal: Knowledge of disease process/condition, treatment plan, diagnostic tests, and medications will improve  Outcome: PROGRESSING SLOWER THAN EXPECTED     Problem: Discharge Barriers/Planning  Goal: Patient's continuum of care needs will be met  Outcome: PROGRESSING SLOWER THAN EXPECTED     Problem: Safety  Goal: Will remain free from injury  Outcome: PROGRESSING AS EXPECTED     Problem: Venous Thromboembolism (VTW)/Deep Vein Thrombosis (DVT) Prevention:  Goal: Patient will participate in Venous Thrombosis (VTE)/Deep Vein Thrombosis (DVT)Prevention Measures  Outcome: PROGRESSING AS EXPECTED

## 2020-12-18 PROCEDURE — 700102 HCHG RX REV CODE 250 W/ 637 OVERRIDE(OP): Performed by: NURSE PRACTITIONER

## 2020-12-18 PROCEDURE — 97597 DBRDMT OPN WND 1ST 20 CM/<: CPT

## 2020-12-18 PROCEDURE — 700111 HCHG RX REV CODE 636 W/ 250 OVERRIDE (IP): Performed by: NURSE PRACTITIONER

## 2020-12-18 PROCEDURE — A9270 NON-COVERED ITEM OR SERVICE: HCPCS | Performed by: NURSE PRACTITIONER

## 2020-12-18 PROCEDURE — 97598 DBRDMT OPN WND ADDL 20CM/<: CPT

## 2020-12-18 PROCEDURE — 770001 HCHG ROOM/CARE - MED/SURG/GYN PRIV*

## 2020-12-18 RX ADMIN — GABAPENTIN 200 MG: 100 CAPSULE ORAL at 17:36

## 2020-12-18 RX ADMIN — GABAPENTIN 200 MG: 100 CAPSULE ORAL at 13:41

## 2020-12-18 RX ADMIN — OXYCODONE HYDROCHLORIDE 10 MG: 10 TABLET ORAL at 00:08

## 2020-12-18 RX ADMIN — RISPERIDONE 1 MG: 1 TABLET ORAL at 17:36

## 2020-12-18 RX ADMIN — RISPERIDONE 0.5 MG: 0.5 TABLET ORAL at 05:41

## 2020-12-18 RX ADMIN — GABAPENTIN 200 MG: 100 CAPSULE ORAL at 05:40

## 2020-12-18 RX ADMIN — OXYCODONE HYDROCHLORIDE 10 MG: 10 TABLET ORAL at 13:41

## 2020-12-18 RX ADMIN — HYDROXYZINE HYDROCHLORIDE 25 MG: 50 TABLET, FILM COATED ORAL at 21:51

## 2020-12-18 RX ADMIN — AMLODIPINE BESYLATE 5 MG: 5 TABLET ORAL at 05:40

## 2020-12-18 RX ADMIN — CYCLOBENZAPRINE 10 MG: 10 TABLET, FILM COATED ORAL at 05:41

## 2020-12-18 RX ADMIN — DIVALPROEX SODIUM 125 MG: 125 CAPSULE ORAL at 21:50

## 2020-12-18 RX ADMIN — DIVALPROEX SODIUM 125 MG: 125 CAPSULE ORAL at 05:41

## 2020-12-18 RX ADMIN — ENOXAPARIN SODIUM 40 MG: 40 INJECTION SUBCUTANEOUS at 05:40

## 2020-12-18 RX ADMIN — AMOXICILLIN AND CLAVULANATE POTASSIUM 1 TABLET: 875; 125 TABLET, FILM COATED ORAL at 17:36

## 2020-12-18 RX ADMIN — OXYCODONE HYDROCHLORIDE 10 MG: 10 TABLET ORAL at 21:51

## 2020-12-18 RX ADMIN — DIVALPROEX SODIUM 125 MG: 125 CAPSULE ORAL at 13:41

## 2020-12-18 RX ADMIN — AMOXICILLIN AND CLAVULANATE POTASSIUM 1 TABLET: 875; 125 TABLET, FILM COATED ORAL at 05:40

## 2020-12-18 ASSESSMENT — PAIN DESCRIPTION - PAIN TYPE
TYPE: ACUTE PAIN
TYPE: SURGICAL PAIN;ACUTE PAIN
TYPE: SURGICAL PAIN;ACUTE PAIN
TYPE: ACUTE PAIN
TYPE: ACUTE PAIN

## 2020-12-18 NOTE — WOUND TEAM
[facility]  Inpatient Services  Wound and Skin Care Progress Note    Admission Date: 8/7/2020     Last order of IP CONSULT TO WOUND CARE was found on 9/1/2020 from Hospital Encounter on 8/7/2020     HPI, PMH, SH: Reviewed    Unit where seen by Wound Team: [id number]    WOUND CONSULT/FOLLOW UP RELATED TO:  Left leg follow-up    Self Report / Pain Level: pt slept through most of wound care  OBJECTIVE:  2 layer wrap intact, patient walking in room with vac on pole.    WOUND TYPE, LOCATION, CHARACTERISTICS (Pressure Injuries: location, stage, POA or date identified)          Negative Pressure Wound Therapy 11/20/20 Leg Lateral;Posterior;Medial Left (Active)   NPWT Pump Mode / Pressure Setting Ulta;Continuous;125 mmHg 12/18/20 1600   Dressing Type Medium;Silver impregnated foam 12/18/20 1600   Number of Foam Pieces Used 3 12/16/20 1100   Canister Changed No 12/18/20 1600   Output (mL) 50 mL 12/06/20 0603   NEXT Dressing Change/Treatment Date 12/18/20 12/16/20 1100   WOUND NURSE ONLY - Time Spent with Patient (mins) 100 12/14/20 1400           Wound 11/19/20 Full Thickness Wound Leg Lateral;Posterior;Medial Left (Active)   Wound Image      12/18/20 1505   Site Assessment Pink;Red 12/18/20 1600   Periwound Assessment Scar tissue 12/18/20 1600   Margins Defined edges;Attached edges 12/18/20 1600   Closure Secondary intention 12/18/20 1600   Drainage Amount Moderate 12/18/20 1600   Drainage Description Serosanguineous 12/18/20 1600   Treatments Site care;Cleansed;CSWD - Conservative Sharp Wound Debridement;Compression 12/18/20 1600   Wound Cleansing Approved Wound Cleanser 12/18/20 1600   Periwound Protectant Skin Protectant Wipes to Periwound;Drape 12/18/20 1600   Dressing Cleansing/Solutions Not Applicable 12/18/20 1600   Dressing Options Wound Vac 12/18/20 1600   Dressing Changed Changed 12/18/20 1600   Dressing Status Clean;Dry;Intact 12/18/20 1600   Dressing Change/Treatment Frequency By Wound Team Only  20 1600   NEXT Dressing Change/Treatment Date 20 1600   NEXT Weekly Photo (Inpatient Only) 20 1600   Non-staged Wound Description Full thickness 20 1600   Wound Bed Granulation (%) 90 % 20 1600   Wound Bed Slough (%) 40 % 20 1300   Wound Bed Granulation (%) - Post-Procedure 100 % 20 1300   Shape linear 20 1016   Wound Odor Mild 20 1600   Pulses Left;2+;DP;PT 20 1400   Exposed Structures None 20 1600   WOUND NURSE ONLY - Time Spent with Patient (mins) 90 20 1600                Wounds are no longer connected - measured as 2 wounds - posterior thigh and medial leg.        VASCULAR    CESAR:   CESAR Results, Last 30 Days Us-cesar Single Level Bilat    Result Date: 2020  Narrative  Vascular Laboratory  Conclusions  1.  Normal bilateral CESAR's.  GRUPO GANN  Age:    64    Gender:     M  MRN:    [id number]  :    1955      BSA:  Exam Date:     2020 09:59  Room #:     Inpatient  Priority:     Routine  Ht (in):             Wt (lb):  Ordering Physician:        EDDA CANADA  Referring Physician:       [id number] Morrissey  Sonographer:               Deja Ellis RDMS                             T  Study Type:                Complete Bilateral  Technical Quality:         Adequate  Indications:     Ulceration of LE  CPT Codes:       99090  ICD Codes:       707.1  History:         Nonhealing wound of left lower extremity.  Limitations:                 RIGHT  Waveform            Systolic BPs (mmHg)                             117           Brachial  Triphasic                                Common Femoral  Triphasic                  138           Posterior Tibial  Triphasic                  132           Dorsalis Pedis                                           Peroneal                             1.18          CESAR                                           TBI                        LEFT  Waveform        Systolic BPs (mmHg)                             114           Brachial  Triphasic                                Common Femoral  Triphasic                  127           Posterior Tibial  Triphasic                  122           Dorsalis Pedis                                           Peroneal                             1.09          KOSTAS                                           TBI  Findings  Right.  Doppler waveforms of the common femoral artery are of high amplitude and  triphasic.  Doppler waveforms at the ankle are brisk and triphasic.  Ankle-brachial index is normal.  Left.  Doppler waveforms of the common femoral artery are of high amplitude and  triphasic.  Doppler waveforms at the ankle are brisk and triphasic.  Ankle-brachial index is normal.  Unable to obtained popliteal waveforms due to wound bandages.  Additional testing was not performed in accordance with lower extremity  arterial evaluation protocol.  Clover Maya  (Electronically Signed)  Final Date:      02 September 2020                   11:14    Lab Values:    Lab Results   Component Value Date/Time    WBC 6.5 11/22/2020 02:44 AM    RBC 3.54 (L) 11/22/2020 02:44 AM    HEMOGLOBIN 9.9 (L) 11/22/2020 02:44 AM    HEMATOCRIT 30.5 (L) 11/22/2020 02:44 AM    CREACTPROT 1.07 (H) 08/12/2020 01:55 PM    SEDRATEWES 85 (H) 08/07/2020 01:20 PM    HBA1C 5.7 (H) 11/09/2018 06:30 PM        Culture Results show:  Recent Results (from the past 720 hour(s))   CULTURE WOUND W/ GRAM STAIN    Collection Time: 09/01/20  2:00 PM    Specimen: Left Leg; Wound   Result Value Ref Range    Significant Indicator POS (POS)     Source WND     Site LEFT LEG     Culture Result - (A)     Gram Stain Result       Moderate Gram positive cocci.  Moderate Gram positive rods.      Culture Result (A)      Beta Hemolytic Streptococcus group A  Moderate growth      Culture Result (A)      Methicillin Resistant Staphylococcus aureus  Moderate  growth      Culture Result (A)      Diphtheroids  Moderate growth  Mixed morphologies.         INTERVENTIONS BY WOUND TEAM:    Dressings removed cleansed wound bed with wound cleanser.  Meredith-wound cleansed with wound cleanser .    Sharp debrided with scalpel 40 cm2 yellow slough and wound cleansed again.  Meredith-wound skin protected with benzoin and dermatac drape.  .  Nystatin to wound bed, Silver foam to all of wound bed, secured with regular drape.  TRAC pad placed.  NPWT resumed, no leaks noted.  .  Fan folded foam padding behind knee crease, and 2 layer compression wrap to the left leg and thigh.   Increased NPWT to 150mmHg due to drainage.      Interdisciplinary consultation: Patient, Bedside RN    EVALUATION / RATIONALE FOR TREATMENT:    12/18/20 wound length decreased.  Odor persists.    12/14/2020: re-cultured wound bed.  12/11/2020 POD 23 Length of wounds has decreased, width has not changed.  Odor persists.  Pt continues to become angry with VAC when it limits his mobility.    12/7/2020 POD 19 odor persists, improved wound bed after debridement.  Possible bacterial vs yeast vs fungal infection.  Culture taken.  Nystatin to treat possible fungal infection, silver foam to decrease microbial population.    12/4/2020 POD# 16 odor worsening, more yellow tissue present, will add nystatin and silver foam next week and consider a culture  11/30 POD#11 granular buds visible to wound bed.  Same as prior.   11/27 POD#8 wound is odorous likely related to biologic dressing.  No overt signs of infection.  Granular buds are visible through adaptic.  Edges do not appear as thick as they were prior to last sx.  Continue with current treatment.    11/23: POD#4 s/p I&D of left LE wounds and biologic placed by Dr. Olvera on 11/19.  Wound bed is flatter and raised edges scar tissue seems to be gone.   11/14 Posterior thigh aspect of wound deteriorating, will increase frequency of treatment to keep biofilm down and hopefully  avoid systemic abx.  Will include thigh in compression wrap.  Will consider culture if improvement is not seen in the next few treatments.    11/10: APRN unavailable at this time.  Will re-schedule excisional debridement to next week.   11/5: Plan for debridement of scarred edges on Tues by LPS. Tendon exposed to posterior aspect of wound, behind knee. Continue with current dressing care.  10/29: Excisional debridement by Asaf ELLER of scar tissue along the proximal edge of the posterior knee and lateral thigh.  Lateral aspect of thigh is flatten.  Medial aspect of knee has thick scar tissue that was excisionally debrided by Asaf ELLER.  Fully granulated throughout the wound bed.   10/23 wound bed mostly granular, superficial in depth, progress has been slow with the wound VAC so will trial collagen product to encourage new tissue growth.    10/19: wound bed has improved in color and is fully granulated.  Addison-wound has improved, denudation has resolved. APRN continuing with serial weekly debridements as needed.   10/16: wound friable pt now on iv abx per ID.  Indurated edges addressed with drape and foam.  Addison wound likely has yeast infection - addressing with silver powder crusting  10/14: patient seen with Asaf ELLER, stopping veraflo instillation.  Starting silver powder and regular wound VAC to see if it will help with bioburden.  Possible colonization of bacteria.  ID involved.   10/12: Inflammation noted to the proximal part of the wound bed.  Will continue to monitor, possible vac break on Wednesday to help addison-skin inflammation.   10/7: Excisional debridement performed by Asaf ELLER. Will need to continue selective debridement with NPWT changes, and weekly excisional debridement with APRN to flatten out the scar tissue.  IV abx therapy ended 10/5.   10/5: Lateral wound still with some slough, both wounds smaller per measurements. Medial wound with all granular  tissue.  9/30: Patient to start abx therapy for 7 days course per Dr. Ellis.  Started dakin's instillation to veraflo  9/28: malodorous today, culture taken.    9/25: Odor noted, though unclear if from wound or pt, consider shower before next Vac change. Consider regular vac next change, wound granular and superficial.   9/23: Patient still awaiting guardianship for placement.   9/18: wound granulating nicely and responding well to current treatment.    9/16: Wound bed with granular tissue, edges are thick but appear better than previous assessments. . NPWT VF to encourage closure by secondary intention and manage drainage while encouraging oxygenation and granulation to the wound bed. 2 layer compression to assist in decrease of swelling and encourage blood flow to wounds. Wound team to follow up and change 3x/week.      Goals: Steady decrease in wound area and depth weekly.    NURSING PLAN OF CARE ORDERS (X):    Dressing changes: See Dressing Care orders: X  Skin care: See Skin Care orders:   Rectal tube care: See Rectal Tube Care orders:   Other orders:      WOUND TEAM PLAN OF CARE:   Dressing changes by wound team:        Follow up 3 times weekly:                NPWT change 3 times weekly:  X,  NPWT changes 3x/ weekly with 2 layer compression wrap.   Follow up 1-2 times weekly:      Follow up Bi-Monthly:                   Follow up as needed:       Other (explain):     Anticipated discharge plans:  LTACH:        SNF/Rehab: X - patient will need ongoing wound care for LLE upon discharge - 3x/weekly           Home Health Care:           Outpatient Wound Center:            Self Care:

## 2020-12-19 PROCEDURE — 700102 HCHG RX REV CODE 250 W/ 637 OVERRIDE(OP): Performed by: NURSE PRACTITIONER

## 2020-12-19 PROCEDURE — A9270 NON-COVERED ITEM OR SERVICE: HCPCS | Performed by: NURSE PRACTITIONER

## 2020-12-19 PROCEDURE — 700111 HCHG RX REV CODE 636 W/ 250 OVERRIDE (IP): Performed by: NURSE PRACTITIONER

## 2020-12-19 PROCEDURE — 770001 HCHG ROOM/CARE - MED/SURG/GYN PRIV*

## 2020-12-19 PROCEDURE — 700101 HCHG RX REV CODE 250: Performed by: NURSE PRACTITIONER

## 2020-12-19 RX ADMIN — GABAPENTIN 200 MG: 100 CAPSULE ORAL at 16:18

## 2020-12-19 RX ADMIN — RISPERIDONE 1 MG: 1 TABLET ORAL at 16:19

## 2020-12-19 RX ADMIN — OXYCODONE HYDROCHLORIDE 10 MG: 10 TABLET ORAL at 23:30

## 2020-12-19 RX ADMIN — OXYCODONE HYDROCHLORIDE 10 MG: 10 TABLET ORAL at 12:24

## 2020-12-19 RX ADMIN — AMOXICILLIN AND CLAVULANATE POTASSIUM 1 TABLET: 875; 125 TABLET, FILM COATED ORAL at 06:38

## 2020-12-19 RX ADMIN — DIVALPROEX SODIUM 125 MG: 125 CAPSULE ORAL at 06:38

## 2020-12-19 RX ADMIN — GABAPENTIN 200 MG: 100 CAPSULE ORAL at 12:24

## 2020-12-19 RX ADMIN — ENOXAPARIN SODIUM 40 MG: 40 INJECTION SUBCUTANEOUS at 06:37

## 2020-12-19 RX ADMIN — RISPERIDONE 0.5 MG: 0.5 TABLET ORAL at 06:37

## 2020-12-19 RX ADMIN — OXYCODONE HYDROCHLORIDE 10 MG: 10 TABLET ORAL at 17:24

## 2020-12-19 RX ADMIN — DIVALPROEX SODIUM 125 MG: 125 CAPSULE ORAL at 14:30

## 2020-12-19 RX ADMIN — DIVALPROEX SODIUM 125 MG: 125 CAPSULE ORAL at 21:17

## 2020-12-19 RX ADMIN — AMOXICILLIN AND CLAVULANATE POTASSIUM 1 TABLET: 875; 125 TABLET, FILM COATED ORAL at 16:18

## 2020-12-19 RX ADMIN — GABAPENTIN 200 MG: 100 CAPSULE ORAL at 06:37

## 2020-12-19 RX ADMIN — LIDOCAINE 1 PATCH: 50 PATCH CUTANEOUS at 12:24

## 2020-12-19 RX ADMIN — OXYCODONE HYDROCHLORIDE 10 MG: 10 TABLET ORAL at 06:37

## 2020-12-19 ASSESSMENT — PAIN DESCRIPTION - PAIN TYPE
TYPE: SURGICAL PAIN
TYPE: CHRONIC PAIN
TYPE: SURGICAL PAIN;ACUTE PAIN
TYPE: SURGICAL PAIN

## 2020-12-19 NOTE — CARE PLAN
Problem: Infection  Goal: Will remain free from infection  Outcome: PROGRESSING AS EXPECTED   Wound vac dressing kept CDI.   Problem: Venous Thromboembolism (VTW)/Deep Vein Thrombosis (DVT) Prevention:  Goal: Patient will participate in Venous Thrombosis (VTE)/Deep Vein Thrombosis (DVT)Prevention Measures  Outcome: PROGRESSING AS EXPECTED   Encourage early mobilization. Refusing SCDs.  receives Lovenox SC for DVT/ VTE prophylaxis.      Problem: Discharge Barriers/Planning  Goal: Patient's continuum of care needs will be met  Outcome: PROGRESSING AS EXPECTED    Pending guardianship   Court date scheduled 1/29/2021 at 10 AM

## 2020-12-19 NOTE — PROGRESS NOTES
roblem: Safety  Goal: Will remain free from falls  Outcome: PROGRESSING AS EXPECTED   Safety precautions in place.  Call light and personal items within reach. Bed at lowest position and locked.  Siderails up X 2.  Clutter free environment & adequate lighting. Educated on level of risk and reminded to call for assistance.  Hourly rounding in effect.     Problem: Infection  Goal: Will remain free from infection  Outcome: PROGRESSING AS EXPECTED   Afebrile. Standard precautions in effect.  Hand washing every encounter, and before & after patient care.  Verbalized understanding.     Problem: Knowledge Deficit  Goal: Knowledge of disease process/condition, treatment plan, diagnostic tests, and medications will improve  Outcome: PROGRESSING AS EXPECTED   Discussed plan of care.  Questions answered.  Verbalized understanding.     Problem: Mobility  Goal: Risk for activity intolerance will decrease  Outcome: PROGRESSING AS EXPECTED   Pt tolerating ambulation without complaints.  Ambulated with RN 1000 feet around the unit.  Educated on ROM exercises, risks and benefits.  Verbalized understanding.

## 2020-12-19 NOTE — CARE PLAN
Problem: Safety  Goal: Will remain free from injury  Outcome: PROGRESSING AS EXPECTED   Treaded socks in place, bed in the lowest position, call light and belongings within reach, pt call for assistance appropriately   Problem: Pain Management  Goal: Pain level will decrease to patient's comfort goal  Outcome: PROGRESSING AS EXPECTED

## 2020-12-20 PROCEDURE — A9270 NON-COVERED ITEM OR SERVICE: HCPCS | Performed by: NURSE PRACTITIONER

## 2020-12-20 PROCEDURE — 99231 SBSQ HOSP IP/OBS SF/LOW 25: CPT | Performed by: NURSE PRACTITIONER

## 2020-12-20 PROCEDURE — 700102 HCHG RX REV CODE 250 W/ 637 OVERRIDE(OP): Performed by: NURSE PRACTITIONER

## 2020-12-20 PROCEDURE — 700101 HCHG RX REV CODE 250: Performed by: NURSE PRACTITIONER

## 2020-12-20 PROCEDURE — 770001 HCHG ROOM/CARE - MED/SURG/GYN PRIV*

## 2020-12-20 PROCEDURE — 700111 HCHG RX REV CODE 636 W/ 250 OVERRIDE (IP): Performed by: NURSE PRACTITIONER

## 2020-12-20 RX ADMIN — RISPERIDONE 1 MG: 1 TABLET ORAL at 18:48

## 2020-12-20 RX ADMIN — GABAPENTIN 200 MG: 100 CAPSULE ORAL at 18:48

## 2020-12-20 RX ADMIN — DIVALPROEX SODIUM 125 MG: 125 CAPSULE ORAL at 20:23

## 2020-12-20 RX ADMIN — DIVALPROEX SODIUM 125 MG: 125 CAPSULE ORAL at 05:19

## 2020-12-20 RX ADMIN — AMOXICILLIN AND CLAVULANATE POTASSIUM 1 TABLET: 875; 125 TABLET, FILM COATED ORAL at 05:19

## 2020-12-20 RX ADMIN — ENOXAPARIN SODIUM 40 MG: 40 INJECTION SUBCUTANEOUS at 05:18

## 2020-12-20 RX ADMIN — GABAPENTIN 200 MG: 100 CAPSULE ORAL at 13:26

## 2020-12-20 RX ADMIN — RISPERIDONE 0.5 MG: 0.5 TABLET ORAL at 05:19

## 2020-12-20 RX ADMIN — OXYCODONE HYDROCHLORIDE 10 MG: 10 TABLET ORAL at 13:26

## 2020-12-20 RX ADMIN — LIDOCAINE 1 PATCH: 50 PATCH CUTANEOUS at 13:26

## 2020-12-20 RX ADMIN — AMOXICILLIN AND CLAVULANATE POTASSIUM 1 TABLET: 875; 125 TABLET, FILM COATED ORAL at 18:48

## 2020-12-20 RX ADMIN — DIVALPROEX SODIUM 125 MG: 125 CAPSULE ORAL at 13:26

## 2020-12-20 RX ADMIN — HYDROXYZINE HYDROCHLORIDE 25 MG: 50 TABLET, FILM COATED ORAL at 20:23

## 2020-12-20 RX ADMIN — GABAPENTIN 200 MG: 100 CAPSULE ORAL at 05:18

## 2020-12-20 RX ADMIN — OXYCODONE HYDROCHLORIDE 10 MG: 10 TABLET ORAL at 20:23

## 2020-12-20 RX ADMIN — AMLODIPINE BESYLATE 5 MG: 5 TABLET ORAL at 05:18

## 2020-12-20 ASSESSMENT — ENCOUNTER SYMPTOMS
SPEECH CHANGE: 0
ABDOMINAL PAIN: 0
INSOMNIA: 0
FEVER: 0
PALPITATIONS: 0
COUGH: 0
CONSTIPATION: 0
HEADACHES: 0
VOMITING: 0
SENSORY CHANGE: 0
DIARRHEA: 0
DIZZINESS: 0
WEAKNESS: 0
EYES NEGATIVE: 1
NAUSEA: 0
DEPRESSION: 1
SHORTNESS OF BREATH: 0
FOCAL WEAKNESS: 0
FALLS: 0
CHILLS: 0

## 2020-12-20 ASSESSMENT — PAIN DESCRIPTION - PAIN TYPE
TYPE: CHRONIC PAIN

## 2020-12-20 NOTE — PROGRESS NOTES
Hospital Medicine Twice Weekly Progress Note    Date of Service  12/20/2020    Chief Complaint  LLE wound    Hospital Course  Mr. Varela is a 65-year-old male who presented to the emergency department with a left lower extremity wound infection. He was hospitalized at our facility in April and evaluated by orthopedic surgery who recommended wound care. Wound cultures at that time grew MSSA and Streptococcus. He had been seen at [facility] earlier that week and prescribed antibiotics, however he had not filled the prescription.  An ultrasound of that extremity was done and was negative for DVT.  He was treated for sepsis and completed an antibiotic course on 9/16/2020. He was also found to have head lice and underwent treatment for that.  A repeat wound culture was done on 9/28/2020 and grew Strep A and Proteus. Infectious disease was consulted and recommended a 5-day course of IV zosyn which was completed on 10/5/2020. On 10/12/2020 the wound care team noticed increased inflammation to the proximal part of the wound bed and switched from a vera flow wound VAC to a regular wound VAC.  ID was again consulted and on 10/14/2020 recommended to 7-day course of antibiotics with meropenem which was completed on 10/22/2020. Additionally, he was noted to have intermittent agitation so was started on risperdal, depakote, and gabapentin per psychiatric recommendations.  On 11/20/2020 he underwent left lower extremity debridement with wound VAC placement.  Since then he has remained stable.  He has been deemed incapacitated to make medical decisions and is currently pending guardianship (which he is contesting) and placement, likely to a group home.   Court date now scheduled for 1/29/2021 at 10 AM    Interval Problem Update  12/16- Patient resting in bed in no acute distress. States some pain to right foot but thinks it is related to the way he was sleeping. Wound care continues to follow for wound and vac  maintenance. Wound culture again positive showing proteus pan sensitive. Wound team to discuss with ID. Wound continues to drain and is low healing. Court date for guardianship in January.    12/20- Patient resting in bed, wound vac in place. Patient continues to wear his tennis shoes while in bed, states he feels it helps. No other needs. Wound team continues to follow.     Consultants/Specialty  -LPS  -ID  -Psychiatry    Code Status  Full Code    Disposition  Pending Guardianship     Review of Systems  Review of Systems   Constitutional: Negative for chills, fever and malaise/fatigue.   Eyes: Negative.    Respiratory: Negative for cough and shortness of breath.    Cardiovascular: Positive for leg swelling. Negative for chest pain and palpitations.   Gastrointestinal: Negative for abdominal pain, constipation, diarrhea, nausea and vomiting.   Genitourinary: Negative for dysuria, frequency and urgency.   Musculoskeletal: Negative for falls.        .   Skin: Negative for itching.   Neurological: Negative for dizziness, sensory change, speech change, focal weakness, weakness and headaches.   Psychiatric/Behavioral: Positive for depression (sometimes). The patient does not have insomnia. Nervous/anxious: sometimes.    All other systems reviewed and are negative.       Physical Exam  Temp:  [36.2 °C (97.1 °F)-36.3 °C (97.3 °F)] 36.3 °C (97.3 °F)  Pulse:  [72-76] 72  Resp:  [17-18] 17  BP: (115-139)/(62-70) 115/62  SpO2:  [96 %-99 %] 96 %    Physical Exam  Vitals signs and nursing note reviewed.   Constitutional:       General: He is sleeping. He is not in acute distress.     Appearance: He is underweight. He is ill-appearing. He is not toxic-appearing or diaphoretic.   HENT:      Head: Normocephalic and atraumatic.      Nose: Nose normal.      Mouth/Throat:      Lips: Pink.      Mouth: Mucous membranes are moist.   Eyes:      General: No scleral icterus.     Conjunctiva/sclera: Conjunctivae normal.   Neck:       Musculoskeletal: Normal range of motion.   Cardiovascular:      Rate and Rhythm: Normal rate.   Pulmonary:      Effort: Pulmonary effort is normal. No respiratory distress.   Abdominal:      General: There is no distension.      Tenderness: There is no abdominal tenderness.   Musculoskeletal:      Comments: GATES   Skin:     General: Skin is warm and dry.      Comments: Wound Vac intact/functioning appropriately to left leg    Neurological:      Mental Status: He is easily aroused. Mental status is at baseline.      Comments: Oriented to himself and hospital   Psychiatric:         Mood and Affect: Mood normal. Affect is labile.         Behavior: Behavior is not aggressive or combative. Behavior is cooperative.         Cognition and Memory: Cognition is impaired. He exhibits impaired recent memory.         Judgment: Judgment is impulsive.      Comments:            Fluids    Intake/Output Summary (Last 24 hours) at 12/20/2020 1052  Last data filed at 12/19/2020 2000  Gross per 24 hour   Intake 240 ml   Output --   Net 240 ml       Laboratory                        Imaging  IR-US GUIDED PIV   Final Result    Ultrasound-guided PERIPHERAL IV INSERTION performed by    qualified nursing staff as above.      IR-MIDLINE CATHETER INSERTION WO GUIDANCE > AGE 3   Final Result                  Ultrasound-guided midline placement performed by qualified nursing staff    as above.          IR-US GUIDED PIV   Final Result    Ultrasound-guided PERIPHERAL IV INSERTION performed by    qualified nursing staff as above.      IR-MIDLINE CATHETER INSERTION WO GUIDANCE > AGE 3   Final Result                  Ultrasound-guided midline placement performed by qualified nursing staff    as above.          US-KOSTAS SINGLE LEVEL BILAT   Final Result      CT-HEAD W/O   Final Result      No acute intracranial abnormality      DX-TIBIA AND FIBULA LEFT   Final Result      1.  Healed distal metaphyseal fractures of the left fibula and tibia with tibial IM  layla in place.      2.  No acute fracture or dislocation.      3.  No radiopaque soft tissue foreign body.      DX-FEMUR-2+ LEFT   Final Result      1.  No radiographic evidence of acute traumatic injury left femur.      2.  Unchanged cortical thickening in the posterior aspect of the distal femoral diaphysis which may represent sequela of prior injury or infection.      3.  No soft tissue foreign body identified.      US-EXTREMITY VENOUS LOWER UNILAT LEFT   Final Result      DX-CHEST-PORTABLE (1 VIEW)   Final Result      No acute cardiopulmonary abnormality.           Assessment/Plan  * Infection of wound due to methicillin resistant Staphylococcus aureus (MRSA)- (present on admission)  Assessment & Plan  -History of MRSA.  -Poor compliance with OP wound care. Poor compliance with wound vac at times.   -Continue pain control as needed.  -LPS and wound care following -debridements and wound VAC changes per their recommendations  -Cultures repeated 12/14- show positive for Proteus species, pan sensitive- Wound team to discuss with ID         Encephalopathy- (present on admission)  Assessment & Plan  -Acute on chronic, likely due Wernicke's. Improved.   -Per psychiatry and Dr. Velázquez: Patient is incapacitated.  -Guardianship and placement pending.  Patient is contesting guardianship, hearing is now scheduled for 1/29/2021 at 10 AM.    Psychiatric disorder- (present on admission)  Assessment & Plan  -Unspecified.  -Continue Risperdal and Depakote.  -Psychiatry has consulted and deemed him incapacitated to leave AMA or make medical decisions  -Pending guardianship, which he is contesting.    Essential hypertension- (present on admission)  Assessment & Plan  -Controlled on amlodipine    Flexion contracture of knee, 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)  Assessment & Plan  -Secondary to chronic wound in that area.  -PT/OT.  -Does not seem to limit his mobility.  Is frequently ambulatory.    Emphysema/COPD (HCC)- (present on  admission)  Assessment & Plan  -Chronic and stable, without acute exacerbation.    Alcohol dependence (HCC)- (present on admission)  Assessment & Plan  -History of. Abstinent since admission.    Protein malnutrition (HCC)- (present on admission)  Assessment & Plan  -Body mass index is 21.35 kg/m².   -Continue TID supplements.  -Encourage PO intake.    Tobacco dependence- (present on admission)  Assessment & Plan  -Abstinent since admission.       VTE prophylaxis: Ambulatory      I have performed a physical exam and reviewed and updated ROS and Assessment/Plan today 12/20/2020. In review of the previous note there are no changes except as documented above    I certify the patient requires continued medically necessary hospital services for the treatment of LLE wound requiring wound vac and intermittent antibiotic therapy.  The patient will remain in the hospital for the foreseeable future.  Discharge may or may not occur in the next 20 days due to ongoing discharge delays due to having no medically acceptable discharge options.        LOUISE Candelario.

## 2020-12-20 NOTE — CARE PLAN
Problem: Knowledge Deficit  Goal: Knowledge of disease process/condition, treatment plan, diagnostic tests, and medications will improve  Outcome: PROGRESSING SLOWER THAN EXPECTED  Patient confused at times & requires re-education of plan of care. Will continue to educate patient on treatment plans & their importance.      Problem: Discharge Barriers/Planning  Goal: Patient's continuum of care needs will be met  Outcome: PROGRESSING SLOWER THAN EXPECTED   Guardianship pending. Court hearing scheduled for 1/29/2021.

## 2020-12-21 PROCEDURE — A9270 NON-COVERED ITEM OR SERVICE: HCPCS | Performed by: NURSE PRACTITIONER

## 2020-12-21 PROCEDURE — 770001 HCHG ROOM/CARE - MED/SURG/GYN PRIV*

## 2020-12-21 PROCEDURE — 700102 HCHG RX REV CODE 250 W/ 637 OVERRIDE(OP): Performed by: NURSE PRACTITIONER

## 2020-12-21 PROCEDURE — 700111 HCHG RX REV CODE 636 W/ 250 OVERRIDE (IP): Performed by: NURSE PRACTITIONER

## 2020-12-21 PROCEDURE — 97606 NEG PRS WND THER DME>50 SQCM: CPT

## 2020-12-21 RX ADMIN — GABAPENTIN 200 MG: 100 CAPSULE ORAL at 06:56

## 2020-12-21 RX ADMIN — RISPERIDONE 0.5 MG: 0.5 TABLET ORAL at 06:57

## 2020-12-21 RX ADMIN — GABAPENTIN 200 MG: 100 CAPSULE ORAL at 21:14

## 2020-12-21 RX ADMIN — AMOXICILLIN AND CLAVULANATE POTASSIUM 1 TABLET: 875; 125 TABLET, FILM COATED ORAL at 06:56

## 2020-12-21 RX ADMIN — HYDROXYZINE HYDROCHLORIDE 25 MG: 50 TABLET, FILM COATED ORAL at 23:42

## 2020-12-21 RX ADMIN — ENOXAPARIN SODIUM 40 MG: 40 INJECTION SUBCUTANEOUS at 06:57

## 2020-12-21 RX ADMIN — AMOXICILLIN AND CLAVULANATE POTASSIUM 1 TABLET: 875; 125 TABLET, FILM COATED ORAL at 17:55

## 2020-12-21 RX ADMIN — DIVALPROEX SODIUM 125 MG: 125 CAPSULE ORAL at 15:08

## 2020-12-21 RX ADMIN — DIVALPROEX SODIUM 125 MG: 125 CAPSULE ORAL at 06:56

## 2020-12-21 RX ADMIN — GABAPENTIN 200 MG: 100 CAPSULE ORAL at 15:08

## 2020-12-21 RX ADMIN — OXYCODONE HYDROCHLORIDE 10 MG: 10 TABLET ORAL at 06:57

## 2020-12-21 RX ADMIN — OXYCODONE HYDROCHLORIDE 10 MG: 10 TABLET ORAL at 21:14

## 2020-12-21 RX ADMIN — DIVALPROEX SODIUM 125 MG: 125 CAPSULE ORAL at 21:14

## 2020-12-21 RX ADMIN — OXYCODONE HYDROCHLORIDE 10 MG: 10 TABLET ORAL at 15:08

## 2020-12-21 RX ADMIN — AMLODIPINE BESYLATE 5 MG: 5 TABLET ORAL at 06:56

## 2020-12-21 RX ADMIN — RISPERIDONE 1 MG: 1 TABLET ORAL at 17:55

## 2020-12-21 ASSESSMENT — PAIN DESCRIPTION - PAIN TYPE
TYPE: ACUTE PAIN

## 2020-12-21 NOTE — CARE PLAN
Problem: Infection  Goal: Will remain free from infection  Outcome: PROGRESSING AS EXPECTED   Hand hygiene advocated. Educated on measures on how to prevent infection.  Left leg wound vac Dressing kept CDI. Receives Augmentin PO BID for ABx coverage.     Problem: Venous Thromboembolism (VTW)/Deep Vein Thrombosis (DVT) Prevention:  Goal: Patient will participate in Venous Thrombosis (VTE)/Deep Vein Thrombosis (DVT)Prevention Measures  Outcome: PROGRESSING AS EXPECTED   Encourage early mobilization. Refused SCDs. receives Lovenox SC  for DVT/VTE prophylaxis.      Problem: Discharge Barriers/Planning  Goal: Patient's continuum of care needs will be met  Outcome: PROGRESSING SLOWER THAN EXPECTED     Pending guardianship  Next court date set 1/29/2021

## 2020-12-21 NOTE — WOUND TEAM
[facility]  Inpatient Services  Wound and Skin Care Progress Note    Admission Date: 8/7/2020     Last order of IP CONSULT TO WOUND CARE was found on 9/1/2020 from Hospital Encounter on 8/7/2020     HPI, PMH, SH: Reviewed    Unit where seen by Wound Team: [id number]    WOUND CONSULT/FOLLOW UP RELATED TO:  Left leg follow-up    Self Report / Pain Level: pt slept through most of wound care  OBJECTIVE:  2 layer wrap intact, patient walking in room with vac on pole.    WOUND TYPE, LOCATION, CHARACTERISTICS (Pressure Injuries: location, stage, POA or date identified)      Negative Pressure Wound Therapy 11/20/20 Leg Lateral;Posterior;Medial Left (Active)   NPWT Pump Mode / Pressure Setting Ulta;Continuous;150 mmHg 12/21/20 1300   Dressing Type Medium;Silver impregnated foam 12/21/20 1300   Number of Foam Pieces Used 3 12/21/20 1300   Canister Changed No 12/21/20 1300   Output (mL) 50 mL 12/06/20 0603   NEXT Dressing Change/Treatment Date 12/24/20 12/21/20 1300   WOUND NURSE ONLY - Time Spent with Patient (mins) 100 12/14/20 1400           Wound 11/19/20 Full Thickness Wound Leg Lateral;Posterior;Medial Left (Active)   Wound Image      12/18/20 1505   Site Assessment Pink;Red;Slough 12/21/20 1300   Periwound Assessment Clean;Dry;Intact 12/21/20 1300   Margins Attached edges;Defined edges 12/21/20 1300   Closure Secondary intention 12/21/20 1300   Drainage Amount Moderate 12/21/20 1300   Drainage Description Serosanguineous 12/21/20 1300   Treatments Cleansed;CSWD - Conservative Sharp Wound Debridement;Irrigation;Site care 12/21/20 1300   Wound Cleansing Approved Wound Cleanser 12/21/20 1300   Periwound Protectant Skin Protectant Wipes to Periwound;Drape 12/21/20 1300   Dressing Cleansing/Solutions Not Applicable 12/21/20 1300   Dressing Options Wound Vac;Compression Wrap Two Layer 12/21/20 1300   Dressing Changed Changed 12/21/20 1300   Dressing Status Clean;Dry;Intact 12/21/20 1300   Dressing Change/Treatment  Frequency By Wound Team Only 20 1300   NEXT Dressing Change/Treatment Date 20 1300   NEXT Weekly Photo (Inpatient Only) 20 1300   Non-staged Wound Description Full thickness 20 1300   Wound Bed Granulation (%) 90 % 20 1600   Wound Bed Slough (%) 40 % 20 1300   Wound Bed Granulation (%) - Post-Procedure 100 % 20 1300   Shape linear 20 1016   Wound Odor Mild 20 1300   Pulses Left;2+;DP;PT 20 1300   Exposed Structures None 20 1300   WOUND NURSE ONLY - Time Spent with Patient (mins) 90 20 1600           Wounds are no longer connected - measured as 2 wounds - posterior thigh and medial leg.        VASCULAR    CESAR:   CESAR Results, Last 30 Days Us-cesar Single Level Bilat    Result Date: 2020  Narrative  Vascular Laboratory  Conclusions  1.  Normal bilateral CESAR's.  GRUPO GANN  Age:    64    Gender:     M  MRN:    [id number]  :    1955      BSA:  Exam Date:     2020 09:59  Room #:     Inpatient  Priority:     Routine  Ht (in):             Wt (lb):  Ordering Physician:        EDDA CANADA  Referring Physician:       [id number] Morrissey  Sonographer:               Deja Ellis RDMS                             T  Study Type:                Complete Bilateral  Technical Quality:         Adequate  Indications:     Ulceration of LE  CPT Codes:       13930  ICD Codes:       707.1  History:         Nonhealing wound of left lower extremity.  Limitations:                 RIGHT  Waveform            Systolic BPs (mmHg)                             117           Brachial  Triphasic                                Common Femoral  Triphasic                  138           Posterior Tibial  Triphasic                  132           Dorsalis Pedis                                           Peroneal                             1.18          CESAR                                            TBI                       LEFT  Waveform        Systolic BPs (mmHg)                             114           Brachial  Triphasic                                Common Femoral  Triphasic                  127           Posterior Tibial  Triphasic                  122           Dorsalis Pedis                                           Peroneal                             1.09          KOSTAS                                           TBI  Findings  Right.  Doppler waveforms of the common femoral artery are of high amplitude and  triphasic.  Doppler waveforms at the ankle are brisk and triphasic.  Ankle-brachial index is normal.  Left.  Doppler waveforms of the common femoral artery are of high amplitude and  triphasic.  Doppler waveforms at the ankle are brisk and triphasic.  Ankle-brachial index is normal.  Unable to obtained popliteal waveforms due to wound bandages.  Additional testing was not performed in accordance with lower extremity  arterial evaluation protocol.  Clover Maya  (Electronically Signed)  Final Date:      02 September 2020                   11:14    Lab Values:    Lab Results   Component Value Date/Time    WBC 6.5 11/22/2020 02:44 AM    RBC 3.54 (L) 11/22/2020 02:44 AM    HEMOGLOBIN 9.9 (L) 11/22/2020 02:44 AM    HEMATOCRIT 30.5 (L) 11/22/2020 02:44 AM    CREACTPROT 1.07 (H) 08/12/2020 01:55 PM    SEDRATEWES 85 (H) 08/07/2020 01:20 PM    HBA1C 5.7 (H) 11/09/2018 06:30 PM        Culture Results show:  Recent Results (from the past 720 hour(s))   CULTURE WOUND W/ GRAM STAIN    Collection Time: 09/01/20  2:00 PM    Specimen: Left Leg; Wound   Result Value Ref Range    Significant Indicator POS (POS)     Source WND     Site LEFT LEG     Culture Result - (A)     Gram Stain Result       Moderate Gram positive cocci.  Moderate Gram positive rods.      Culture Result (A)      Beta Hemolytic Streptococcus group A  Moderate growth      Culture Result (A)      Methicillin Resistant  Staphylococcus aureus  Moderate growth      Culture Result (A)      Diphtheroids  Moderate growth  Mixed morphologies.         INTERVENTIONS BY WOUND TEAM:    Dressings removed cleansed wound bed with wound cleanser.  Meredith-wound cleansed with wound cleanser .    Sharp debrided with scalpel 40 cm2 yellow slough and wound cleansed again.  Meredith-wound skin protected with benzoin and dermatac drape.  .  Nystatin to wound bed, Silver foam to all of wound bed, secured with regular drape.  TRAC pad placed.  NPWT resumed, no leaks noted.  .  Fan folded foam padding behind knee crease, and 2 layer compression wrap to the left leg and thigh.   NPWT to 150mmHg due to drainage.      Interdisciplinary consultation: Patient, Bedside RN    EVALUATION / RATIONALE FOR TREATMENT:    12/18/20 wound length decreased.  Odor persists.    12/14/2020: re-cultured wound bed.  12/11/2020 POD 23 Length of wounds has decreased, width has not changed.  Odor persists.  Pt continues to become angry with VAC when it limits his mobility.    12/7/2020 POD 19 odor persists, improved wound bed after debridement.  Possible bacterial vs yeast vs fungal infection.  Culture taken.  Nystatin to treat possible fungal infection, silver foam to decrease microbial population.    12/4/2020 POD# 16 odor worsening, more yellow tissue present, will add nystatin and silver foam next week and consider a culture  11/30 POD#11 granular buds visible to wound bed.  Same as prior.   11/27 POD#8 wound is odorous likely related to biologic dressing.  No overt signs of infection.  Granular buds are visible through adaptic.  Edges do not appear as thick as they were prior to last sx.  Continue with current treatment.    11/23: POD#4 s/p I&D of left LE wounds and biologic placed by Dr. Olvera on 11/19.  Wound bed is flatter and raised edges scar tissue seems to be gone.   11/14 Posterior thigh aspect of wound deteriorating, will increase frequency of treatment to keep biofilm  down and hopefully avoid systemic abx.  Will include thigh in compression wrap.  Will consider culture if improvement is not seen in the next few treatments.    11/10: APRN unavailable at this time.  Will re-schedule excisional debridement to next week.   11/5: Plan for debridement of scarred edges on Tues by LPS. Tendon exposed to posterior aspect of wound, behind knee. Continue with current dressing care.  10/29: Excisional debridement by Asaf ELLER of scar tissue along the proximal edge of the posterior knee and lateral thigh.  Lateral aspect of thigh is flatten.  Medial aspect of knee has thick scar tissue that was excisionally debrided by Asaf ELLRE.  Fully granulated throughout the wound bed.   10/23 wound bed mostly granular, superficial in depth, progress has been slow with the wound VAC so will trial collagen product to encourage new tissue growth.    10/19: wound bed has improved in color and is fully granulated.  Addison-wound has improved, denudation has resolved. APRN continuing with serial weekly debridements as needed.   10/16: wound friable pt now on iv abx per ID.  Indurated edges addressed with drape and foam.  Addison wound likely has yeast infection - addressing with silver powder crusting  10/14: patient seen with Asaf ELLER, stopping veraflo instillation.  Starting silver powder and regular wound VAC to see if it will help with bioburden.  Possible colonization of bacteria.  ID involved.   10/12: Inflammation noted to the proximal part of the wound bed.  Will continue to monitor, possible vac break on Wednesday to help addison-skin inflammation.   10/7: Excisional debridement performed by Asaf ELLER. Will need to continue selective debridement with NPWT changes, and weekly excisional debridement with APRN to flatten out the scar tissue.  IV abx therapy ended 10/5.   10/5: Lateral wound still with some slough, both wounds smaller per measurements. Medial wound with all  granular tissue.  9/30: Patient to start abx therapy for 7 days course per Dr. Ellis.  Started dakin's instillation to veraflo  9/28: malodorous today, culture taken.    9/25: Odor noted, though unclear if from wound or pt, consider shower before next Vac change. Consider regular vac next change, wound granular and superficial.   9/23: Patient still awaiting guardianship for placement.   9/18: wound granulating nicely and responding well to current treatment.    9/16: Wound bed with granular tissue, edges are thick but appear better than previous assessments. . NPWT VF to encourage closure by secondary intention and manage drainage while encouraging oxygenation and granulation to the wound bed. 2 layer compression to assist in decrease of swelling and encourage blood flow to wounds. Wound team to follow up and change 3x/week.      Goals: Steady decrease in wound area and depth weekly.    NURSING PLAN OF CARE ORDERS (X):    Dressing changes: See Dressing Care orders: X  Skin care: See Skin Care orders:   Rectal tube care: See Rectal Tube Care orders:   Other orders:      WOUND TEAM PLAN OF CARE:   Dressing changes by wound team:        Follow up 3 times weekly:                NPWT change 3 times weekly:  X,  NPWT changes 3x/ weekly with 2 layer compression wrap.   Follow up 1-2 times weekly:      Follow up Bi-Monthly:                   Follow up as needed:       Other (explain):     Anticipated discharge plans:  LTACH:        SNF/Rehab: X - patient will need ongoing wound care for LLE upon discharge - 3x/weekly           Home Health Care:           Outpatient Wound Center:            Self Care:

## 2020-12-22 PROCEDURE — A9270 NON-COVERED ITEM OR SERVICE: HCPCS | Performed by: NURSE PRACTITIONER

## 2020-12-22 PROCEDURE — 770001 HCHG ROOM/CARE - MED/SURG/GYN PRIV*

## 2020-12-22 PROCEDURE — 700102 HCHG RX REV CODE 250 W/ 637 OVERRIDE(OP): Performed by: NURSE PRACTITIONER

## 2020-12-22 PROCEDURE — 700101 HCHG RX REV CODE 250: Performed by: NURSE PRACTITIONER

## 2020-12-22 PROCEDURE — 99231 SBSQ HOSP IP/OBS SF/LOW 25: CPT | Performed by: NURSE PRACTITIONER

## 2020-12-22 PROCEDURE — 700111 HCHG RX REV CODE 636 W/ 250 OVERRIDE (IP): Performed by: NURSE PRACTITIONER

## 2020-12-22 RX ADMIN — DIVALPROEX SODIUM 125 MG: 125 CAPSULE ORAL at 05:33

## 2020-12-22 RX ADMIN — AMOXICILLIN AND CLAVULANATE POTASSIUM 1 TABLET: 875; 125 TABLET, FILM COATED ORAL at 05:33

## 2020-12-22 RX ADMIN — LIDOCAINE 1 PATCH: 50 PATCH CUTANEOUS at 12:54

## 2020-12-22 RX ADMIN — AMLODIPINE BESYLATE 5 MG: 5 TABLET ORAL at 05:33

## 2020-12-22 RX ADMIN — ENOXAPARIN SODIUM 40 MG: 40 INJECTION SUBCUTANEOUS at 05:32

## 2020-12-22 RX ADMIN — RISPERIDONE 0.5 MG: 0.5 TABLET ORAL at 05:33

## 2020-12-22 RX ADMIN — GABAPENTIN 200 MG: 100 CAPSULE ORAL at 05:33

## 2020-12-22 RX ADMIN — DIVALPROEX SODIUM 125 MG: 125 CAPSULE ORAL at 14:13

## 2020-12-22 RX ADMIN — GABAPENTIN 200 MG: 100 CAPSULE ORAL at 16:18

## 2020-12-22 RX ADMIN — RISPERIDONE 1 MG: 1 TABLET ORAL at 16:18

## 2020-12-22 RX ADMIN — GABAPENTIN 200 MG: 100 CAPSULE ORAL at 12:54

## 2020-12-22 RX ADMIN — OXYCODONE HYDROCHLORIDE 10 MG: 10 TABLET ORAL at 20:34

## 2020-12-22 RX ADMIN — DIVALPROEX SODIUM 125 MG: 125 CAPSULE ORAL at 20:34

## 2020-12-22 RX ADMIN — OXYCODONE HYDROCHLORIDE 10 MG: 10 TABLET ORAL at 12:54

## 2020-12-22 RX ADMIN — AMOXICILLIN AND CLAVULANATE POTASSIUM 1 TABLET: 875; 125 TABLET, FILM COATED ORAL at 16:18

## 2020-12-22 ASSESSMENT — ENCOUNTER SYMPTOMS
CONSTIPATION: 0
FOCAL WEAKNESS: 0
DIZZINESS: 0
FEVER: 0
WEAKNESS: 0
DIARRHEA: 0
SENSORY CHANGE: 0
VOMITING: 0
COUGH: 0
CHILLS: 0
PALPITATIONS: 0
DEPRESSION: 1
FALLS: 0
EYES NEGATIVE: 1
INSOMNIA: 0
ABDOMINAL PAIN: 0
SPEECH CHANGE: 0
SHORTNESS OF BREATH: 0
HEADACHES: 0
NAUSEA: 0

## 2020-12-22 ASSESSMENT — PAIN DESCRIPTION - PAIN TYPE: TYPE: SURGICAL PAIN

## 2020-12-22 NOTE — CARE PLAN
Problem: Safety  Goal: Will remain free from injury  Outcome: PROGRESSING AS EXPECTED  Pt educated to call for assistance. Pt checked on frequently throughout the shift.      Problem: Venous Thromboembolism (VTW)/Deep Vein Thrombosis (DVT) Prevention:  Goal: Patient will participate in Venous Thrombosis (VTE)/Deep Vein Thrombosis (DVT)Prevention Measures  Outcome: PROGRESSING AS EXPECTED  Pt ambulates frequently. Gave Enoxaparin per order.

## 2020-12-22 NOTE — DISCHARGE PLANNING
Anticipated Discharge Disposition: Guardianship then GH     Action: Continue to await Guardianship. Court hearing to be 1/29/21. Will need a cog eval prior.      Barriers to Discharge: Pending guardianship and then eventual GH placement.     Plan: Continue to F/U on [facility].

## 2020-12-23 PROCEDURE — 700101 HCHG RX REV CODE 250: Performed by: NURSE PRACTITIONER

## 2020-12-23 PROCEDURE — A9270 NON-COVERED ITEM OR SERVICE: HCPCS | Performed by: NURSE PRACTITIONER

## 2020-12-23 PROCEDURE — 770001 HCHG ROOM/CARE - MED/SURG/GYN PRIV*

## 2020-12-23 PROCEDURE — 700102 HCHG RX REV CODE 250 W/ 637 OVERRIDE(OP): Performed by: NURSE PRACTITIONER

## 2020-12-23 PROCEDURE — 700111 HCHG RX REV CODE 636 W/ 250 OVERRIDE (IP): Performed by: NURSE PRACTITIONER

## 2020-12-23 RX ADMIN — DIVALPROEX SODIUM 125 MG: 125 CAPSULE ORAL at 22:15

## 2020-12-23 RX ADMIN — OXYCODONE HYDROCHLORIDE 10 MG: 10 TABLET ORAL at 12:50

## 2020-12-23 RX ADMIN — ENOXAPARIN SODIUM 40 MG: 40 INJECTION SUBCUTANEOUS at 04:09

## 2020-12-23 RX ADMIN — GABAPENTIN 200 MG: 100 CAPSULE ORAL at 04:09

## 2020-12-23 RX ADMIN — OXYCODONE HYDROCHLORIDE 10 MG: 10 TABLET ORAL at 22:15

## 2020-12-23 RX ADMIN — GABAPENTIN 200 MG: 100 CAPSULE ORAL at 17:21

## 2020-12-23 RX ADMIN — RISPERIDONE 0.5 MG: 0.5 TABLET ORAL at 04:09

## 2020-12-23 RX ADMIN — HYDROXYZINE HYDROCHLORIDE 25 MG: 50 TABLET, FILM COATED ORAL at 23:54

## 2020-12-23 RX ADMIN — DIVALPROEX SODIUM 125 MG: 125 CAPSULE ORAL at 12:50

## 2020-12-23 RX ADMIN — RISPERIDONE 1 MG: 1 TABLET ORAL at 17:21

## 2020-12-23 RX ADMIN — AMOXICILLIN AND CLAVULANATE POTASSIUM 1 TABLET: 875; 125 TABLET, FILM COATED ORAL at 04:09

## 2020-12-23 RX ADMIN — AMLODIPINE BESYLATE 5 MG: 5 TABLET ORAL at 04:09

## 2020-12-23 RX ADMIN — HYDROXYZINE HYDROCHLORIDE 25 MG: 50 TABLET, FILM COATED ORAL at 00:23

## 2020-12-23 RX ADMIN — DIVALPROEX SODIUM 125 MG: 125 CAPSULE ORAL at 04:09

## 2020-12-23 RX ADMIN — GABAPENTIN 200 MG: 100 CAPSULE ORAL at 12:50

## 2020-12-23 RX ADMIN — LIDOCAINE 1 PATCH: 50 PATCH CUTANEOUS at 12:51

## 2020-12-23 ASSESSMENT — PAIN DESCRIPTION - PAIN TYPE: TYPE: SURGICAL PAIN;CHRONIC PAIN

## 2020-12-23 NOTE — PROGRESS NOTES
Hospital Medicine Twice Weekly Progress Note    Date of Service  12/22/2020    Chief Complaint  LLE wound    Hospital Course  Mr. Varela is a 65-year-old male who presented to the emergency department with a left lower extremity wound infection. He was hospitalized at our facility in April and evaluated by orthopedic surgery who recommended wound care. Wound cultures at that time grew MSSA and Streptococcus. He had been seen at [facility] earlier that week and prescribed antibiotics, however he had not filled the prescription.  An ultrasound of that extremity was done and was negative for DVT.  He was treated for sepsis and completed an antibiotic course on 9/16/2020. He was also found to have head lice and underwent treatment for that.  A repeat wound culture was done on 9/28/2020 and grew Strep A and Proteus. Infectious disease was consulted and recommended a 5-day course of IV zosyn which was completed on 10/5/2020. On 10/12/2020 the wound care team noticed increased inflammation to the proximal part of the wound bed and switched from a vera flow wound VAC to a regular wound VAC.  ID was again consulted and on 10/14/2020 recommended to 7-day course of antibiotics with meropenem which was completed on 10/22/2020. Additionally, he was noted to have intermittent agitation so was started on risperdal, depakote, and gabapentin per psychiatric recommendations.  On 11/20/2020 he underwent left lower extremity debridement with wound VAC placement.  Since then he has remained stable.  He has been deemed incapacitated to make medical decisions and is currently pending guardianship (which he is contesting) and placement, likely to a group home.   Court date now scheduled for 1/29/2021 at 10 AM    Interval Problem Update  12/22:  Patient resting comfortably in bed.  Wound vac intact.  Pain controlled.  VSS.  Afebrile.     Consultants/Specialty  -LPS  -ID  -Psychiatry    Code Status  Full  Code    Disposition  Pending Guardianship     Review of Systems  Review of Systems   Constitutional: Negative for chills, fever and malaise/fatigue.   Eyes: Negative.    Respiratory: Negative for cough and shortness of breath.    Cardiovascular: Positive for leg swelling. Negative for chest pain and palpitations.   Gastrointestinal: Negative for abdominal pain, constipation, diarrhea, nausea and vomiting.   Genitourinary: Negative for dysuria, frequency and urgency.   Musculoskeletal: Negative for falls.        .   Skin: Negative for itching.   Neurological: Negative for dizziness, sensory change, speech change, focal weakness, weakness and headaches.   Psychiatric/Behavioral: Positive for depression (sometimes). The patient does not have insomnia. Nervous/anxious: sometimes.    All other systems reviewed and are negative.       Physical Exam  Temp:  [36.2 °C (97.2 °F)-36.8 °C (98.3 °F)] 36.8 °C (98.3 °F)  Pulse:  [68-88] 72  Resp:  [16-17] 17  BP: (109-138)/(68-86) 129/68  SpO2:  [94 %-98 %] 94 %    Physical Exam  Vitals signs and nursing note reviewed.   Constitutional:       General: He is sleeping. He is not in acute distress.     Appearance: He is underweight. He is not ill-appearing.   HENT:      Head: Normocephalic and atraumatic.      Nose: Nose normal. No congestion.      Mouth/Throat:      Lips: Pink.      Mouth: Mucous membranes are moist.      Pharynx: No oropharyngeal exudate.   Eyes:      General:         Right eye: No discharge.         Left eye: No discharge.      Conjunctiva/sclera: Conjunctivae normal.   Neck:      Musculoskeletal: Normal range of motion.   Cardiovascular:      Rate and Rhythm: Normal rate.   Pulmonary:      Effort: Pulmonary effort is normal. No respiratory distress.   Abdominal:      General: Bowel sounds are normal. There is no distension.      Tenderness: There is no abdominal tenderness.   Musculoskeletal:      Comments: GATES   Skin:     General: Skin is warm and dry.       Comments: Wound Vac intact/functioning appropriately to left leg    Neurological:      Mental Status: He is easily aroused. Mental status is at baseline.      Comments: Oriented to himself and hospital   Psychiatric:         Mood and Affect: Mood normal. Affect is labile.         Behavior: Behavior is not aggressive or combative. Behavior is cooperative.         Cognition and Memory: Cognition is impaired. He exhibits impaired recent memory.         Judgment: Judgment is impulsive.      Comments:            Fluids    Intake/Output Summary (Last 24 hours) at 12/22/2020 1958  Last data filed at 12/22/2020 1800  Gross per 24 hour   Intake 1380 ml   Output --   Net 1380 ml       Laboratory                        Imaging  IR-US GUIDED PIV   Final Result    Ultrasound-guided PERIPHERAL IV INSERTION performed by    qualified nursing staff as above.      IR-MIDLINE CATHETER INSERTION WO GUIDANCE > AGE 3   Final Result                  Ultrasound-guided midline placement performed by qualified nursing staff    as above.          IR-US GUIDED PIV   Final Result    Ultrasound-guided PERIPHERAL IV INSERTION performed by    qualified nursing staff as above.      IR-MIDLINE CATHETER INSERTION WO GUIDANCE > AGE 3   Final Result                  Ultrasound-guided midline placement performed by qualified nursing staff    as above.          US-KOSTAS SINGLE LEVEL BILAT   Final Result      CT-HEAD W/O   Final Result      No acute intracranial abnormality      DX-TIBIA AND FIBULA LEFT   Final Result      1.  Healed distal metaphyseal fractures of the left fibula and tibia with tibial IM layla in place.      2.  No acute fracture or dislocation.      3.  No radiopaque soft tissue foreign body.      DX-FEMUR-2+ LEFT   Final Result      1.  No radiographic evidence of acute traumatic injury left femur.      2.  Unchanged cortical thickening in the posterior aspect of the distal femoral diaphysis which may represent sequela of prior injury or  infection.      3.  No soft tissue foreign body identified.      US-EXTREMITY VENOUS LOWER UNILAT LEFT   Final Result      DX-CHEST-PORTABLE (1 VIEW)   Final Result      No acute cardiopulmonary abnormality.           Assessment/Plan  * Infection of wound due to methicillin resistant Staphylococcus aureus (MRSA)- (present on admission)  Assessment & Plan  -History of MRSA.  -Poor compliance with OP wound care. Poor compliance with wound vac at times.   -Continue pain control as needed.  -LPS and wound care following -debridements and wound VAC changes per their recommendations  -Cultures repeated 12/14- show positive for Proteus species, pan sensitive.  Complete regimen of augmentin.         Encephalopathy- (present on admission)  Assessment & Plan  -Acute on chronic, likely due Wernicke's. Improved.   -Per psychiatry and Dr. Velázquez: Patient is incapacitated.  -Guardianship and placement pending.  Patient is contesting guardianship, hearing is now scheduled for 1/29/2021 at 10 AM.    Psychiatric disorder- (present on admission)  Assessment & Plan  -Unspecified.  -Continue Risperdal and Depakote.  -Psychiatry has consulted and deemed him incapacitated to leave AMA or make medical decisions  -Pending guardianship, which he is contesting.    Essential hypertension- (present on admission)  Assessment & Plan  -Controlled on amlodipine    Flexion contracture of knee, 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)  Assessment & Plan  -Secondary to chronic wound in that area.  -PT/OT.  -Does not seem to limit his mobility.  Is frequently ambulatory.    Emphysema/COPD (HCC)- (present on admission)  Assessment & Plan  -Chronic and stable, without acute exacerbation.    Alcohol dependence (HCC)- (present on admission)  Assessment & Plan  -History of. Abstinent since admission.    Protein malnutrition (HCC)- (present on admission)  Assessment & Plan  -Body mass index is 21.35 kg/m².   -Continue TID supplements.  -Encourage PO intake.    Tobacco  dependence- (present on admission)  Assessment & Plan  -Abstinent since admission.       VTE prophylaxis: Ambulatory      I have performed a physical exam and reviewed and updated ROS and Assessment/Plan today 12/22/2020. In review of the previous note there are no changes except as documented above        LOUISE Marroquin.

## 2020-12-24 PROCEDURE — A9270 NON-COVERED ITEM OR SERVICE: HCPCS | Performed by: NURSE PRACTITIONER

## 2020-12-24 PROCEDURE — 700111 HCHG RX REV CODE 636 W/ 250 OVERRIDE (IP): Performed by: NURSE PRACTITIONER

## 2020-12-24 PROCEDURE — 700101 HCHG RX REV CODE 250: Performed by: NURSE PRACTITIONER

## 2020-12-24 PROCEDURE — 97606 NEG PRS WND THER DME>50 SQCM: CPT

## 2020-12-24 PROCEDURE — 700102 HCHG RX REV CODE 250 W/ 637 OVERRIDE(OP): Performed by: NURSE PRACTITIONER

## 2020-12-24 PROCEDURE — 770001 HCHG ROOM/CARE - MED/SURG/GYN PRIV*

## 2020-12-24 RX ADMIN — OXYCODONE HYDROCHLORIDE 10 MG: 10 TABLET ORAL at 04:21

## 2020-12-24 RX ADMIN — DIVALPROEX SODIUM 125 MG: 125 CAPSULE ORAL at 15:19

## 2020-12-24 RX ADMIN — OXYCODONE HYDROCHLORIDE 10 MG: 10 TABLET ORAL at 21:41

## 2020-12-24 RX ADMIN — LIDOCAINE 1 PATCH: 50 PATCH CUTANEOUS at 15:20

## 2020-12-24 RX ADMIN — AMLODIPINE BESYLATE 5 MG: 5 TABLET ORAL at 04:21

## 2020-12-24 RX ADMIN — DIVALPROEX SODIUM 125 MG: 125 CAPSULE ORAL at 04:21

## 2020-12-24 RX ADMIN — RISPERIDONE 0.5 MG: 0.5 TABLET ORAL at 04:21

## 2020-12-24 RX ADMIN — GABAPENTIN 200 MG: 100 CAPSULE ORAL at 21:40

## 2020-12-24 RX ADMIN — DIVALPROEX SODIUM 125 MG: 125 CAPSULE ORAL at 21:40

## 2020-12-24 RX ADMIN — RISPERIDONE 1 MG: 1 TABLET ORAL at 17:24

## 2020-12-24 RX ADMIN — OXYCODONE HYDROCHLORIDE 10 MG: 10 TABLET ORAL at 15:19

## 2020-12-24 RX ADMIN — ENOXAPARIN SODIUM 40 MG: 40 INJECTION SUBCUTANEOUS at 04:21

## 2020-12-24 RX ADMIN — GABAPENTIN 200 MG: 100 CAPSULE ORAL at 15:19

## 2020-12-24 RX ADMIN — GABAPENTIN 200 MG: 100 CAPSULE ORAL at 04:21

## 2020-12-24 ASSESSMENT — PAIN DESCRIPTION - PAIN TYPE
TYPE: SURGICAL PAIN
TYPE: SURGICAL PAIN;CHRONIC PAIN
TYPE: SURGICAL PAIN
TYPE: SURGICAL PAIN;CHRONIC PAIN
TYPE: SURGICAL PAIN
TYPE: SURGICAL PAIN;CHRONIC PAIN

## 2020-12-24 NOTE — WOUND TEAM
[facility]  Inpatient Services  Wound and Skin Care Progress Note    Admission Date: 8/7/2020     Last order of IP CONSULT TO WOUND CARE was found on 9/1/2020 from Hospital Encounter on 8/7/2020     HPI, PMH, SH: Reviewed    Unit where seen by Wound Team: [id number]    WOUND CONSULT/FOLLOW UP RELATED TO:  Left leg follow-up    Self Report / Pain Level: pt slept through most of wound care    OBJECTIVE:  2 layer wrap intact, patient walking in room with vac on pole.    WOUND TYPE, LOCATION, CHARACTERISTICS (Pressure Injuries: location, stage, POA or date identified)     Negative Pressure Wound Therapy 11/20/20 Leg Lateral;Posterior;Medial Left (Active)   NPWT Pump Mode / Pressure Setting Ulta;Continuous;125 mmHg 12/24/20 0945   Dressing Type Medium;Silver impregnated foam 12/24/20 0945   Number of Foam Pieces Used 4 12/24/20 0945   Canister Changed No 12/24/20 0945   Output (mL) 50 mL 12/06/20 0603   NEXT Dressing Change/Treatment Date 12/26/20 12/24/20 0945   WOUND NURSE ONLY - Time Spent with Patient (mins) 100 12/14/20 1400           Wound 11/19/20 Full Thickness Wound Leg Lateral;Posterior;Medial Left (Active)   Wound Image    12/24/20 0945   Site Assessment Red;Early/partial granulation 12/24/20 0945   Periwound Assessment Clean;Dry;Intact;Scar tissue 12/24/20 0945   Margins Attached edges;Defined edges 12/24/20 0945   Closure Secondary intention 12/24/20 0945   Drainage Amount Small 12/24/20 0945   Drainage Description Serosanguineous 12/24/20 0945   Treatments Cleansed;Irrigation;Site care 12/24/20 0945   Wound Cleansing Approved Wound Cleanser 12/24/20 0945   Periwound Protectant Skin Protectant Wipes to Periwound;Drape 12/24/20 0945   Dressing Cleansing/Solutions Not Applicable 12/24/20 0945   Dressing Options Wound Vac;Compression Wrap Two Layer 12/24/20 0945   Dressing Changed Changed 12/24/20 0945   Dressing Status Clean;Dry;Intact 12/24/20 0945   Dressing Change/Treatment Frequency By Wound Team  Only 20 0945   NEXT Dressing Change/Treatment Date 20 0945   NEXT Weekly Photo (Inpatient Only) 20 0945   Non-staged Wound Description Full thickness 20 1300   Wound Bed Granulation (%) 90 % 20 1600   Wound Bed Slough (%) 40 % 20 1300   Wound Bed Granulation (%) - Post-Procedure 100 % 20 1300   Shape linear 2045   Wound Odor Mild 2045   Pulses Left;2+;DP;PT 20   Exposed Structures None 20   WOUND NURSE ONLY - Time Spent with Patient (mins) 90 20 1600           Wounds are no longer connected - measured as 2 wounds - posterior thigh and medial leg.        VASCULAR    CESAR:   CESAR Results, Last 30 Days Us-cesar Single Level Bilat    Result Date: 2020  Narrative  Vascular Laboratory  Conclusions  1.  Normal bilateral CESAR's.  GRUPO GANN  Age:    64    Gender:     M  MRN:    [id number]  :    1955      BSA:  Exam Date:     2020 09:59  Room #:     Inpatient  Priority:     Routine  Ht (in):             Wt (lb):  Ordering Physician:        EDDA CANADA  Referring Physician:       [id number] Morrissey  Sonographer:               Deja Ellis RDMS                             T  Study Type:                Complete Bilateral  Technical Quality:         Adequate  Indications:     Ulceration of LE  CPT Codes:       31099  ICD Codes:       707.1  History:         Nonhealing wound of left lower extremity.  Limitations:                 RIGHT  Waveform            Systolic BPs (mmHg)                             117           Brachial  Triphasic                                Common Femoral  Triphasic                  138           Posterior Tibial  Triphasic                  132           Dorsalis Pedis                                           Peroneal                             1.18          CESAR                                           TBI                        LEFT  Waveform        Systolic BPs (mmHg)                             114           Brachial  Triphasic                                Common Femoral  Triphasic                  127           Posterior Tibial  Triphasic                  122           Dorsalis Pedis                                           Peroneal                             1.09          KOSTAS                                           TBI  Findings  Right.  Doppler waveforms of the common femoral artery are of high amplitude and  triphasic.  Doppler waveforms at the ankle are brisk and triphasic.  Ankle-brachial index is normal.  Left.  Doppler waveforms of the common femoral artery are of high amplitude and  triphasic.  Doppler waveforms at the ankle are brisk and triphasic.  Ankle-brachial index is normal.  Unable to obtained popliteal waveforms due to wound bandages.  Additional testing was not performed in accordance with lower extremity  arterial evaluation protocol.  Clover Maya  (Electronically Signed)  Final Date:      02 September 2020                   11:14    Lab Values:    Lab Results   Component Value Date/Time    WBC 6.5 11/22/2020 02:44 AM    RBC 3.54 (L) 11/22/2020 02:44 AM    HEMOGLOBIN 9.9 (L) 11/22/2020 02:44 AM    HEMATOCRIT 30.5 (L) 11/22/2020 02:44 AM    CREACTPROT 1.07 (H) 08/12/2020 01:55 PM    SEDRATEWES 85 (H) 08/07/2020 01:20 PM    HBA1C 5.7 (H) 11/09/2018 06:30 PM        Culture Results show:  Recent Results (from the past 720 hour(s))   CULTURE WOUND W/ GRAM STAIN    Collection Time: 09/01/20  2:00 PM    Specimen: Left Leg; Wound   Result Value Ref Range    Significant Indicator POS (POS)     Source WND     Site LEFT LEG     Culture Result - (A)     Gram Stain Result       Moderate Gram positive cocci.  Moderate Gram positive rods.      Culture Result (A)      Beta Hemolytic Streptococcus group A  Moderate growth      Culture Result (A)      Methicillin Resistant Staphylococcus aureus  Moderate  growth      Culture Result (A)      Diphtheroids  Moderate growth  Mixed morphologies.         INTERVENTIONS BY WOUND TEAM:    Dressings removed cleansed wound bed with wound cleanser.  Meredith-wound cleansed with wound cleanser .    Sharp debrided with scalpel 40 cm2 yellow slough and wound cleansed again.  Meredith-wound skin protected with benzoin and dermatac drape.  .  Nystatin to wound bed, Silver foam to all of wound bed, secured with regular drape.  TRAC pad placed.  NPWT resumed, no leaks noted.  .  Fan folded foam padding behind knee crease, and 2 layer compression wrap to the left leg and thigh.   NPWT to 150mmHg due to drainage.      Interdisciplinary consultation: Patient, Bedside RN    EVALUATION / RATIONALE FOR TREATMENT:    12/18/20 wound length decreased.  Odor persists.    12/14/2020: re-cultured wound bed.  12/11/2020 POD 23 Length of wounds has decreased, width has not changed.  Odor persists.  Pt continues to become angry with VAC when it limits his mobility.    12/7/2020 POD 19 odor persists, improved wound bed after debridement.  Possible bacterial vs yeast vs fungal infection.  Culture taken.  Nystatin to treat possible fungal infection, silver foam to decrease microbial population.    12/4/2020 POD# 16 odor worsening, more yellow tissue present, will add nystatin and silver foam next week and consider a culture  11/30 POD#11 granular buds visible to wound bed.  Same as prior.   11/27 POD#8 wound is odorous likely related to biologic dressing.  No overt signs of infection.  Granular buds are visible through adaptic.  Edges do not appear as thick as they were prior to last sx.  Continue with current treatment.    11/23: POD#4 s/p I&D of left LE wounds and biologic placed by Dr. Olvera on 11/19.  Wound bed is flatter and raised edges scar tissue seems to be gone.   11/14 Posterior thigh aspect of wound deteriorating, will increase frequency of treatment to keep biofilm down and hopefully avoid  systemic abx.  Will include thigh in compression wrap.  Will consider culture if improvement is not seen in the next few treatments.    11/10: APRN unavailable at this time.  Will re-schedule excisional debridement to next week.   11/5: Plan for debridement of scarred edges on Tues by LPS. Tendon exposed to posterior aspect of wound, behind knee. Continue with current dressing care.  10/29: Excisional debridement by Asaf ELLER of scar tissue along the proximal edge of the posterior knee and lateral thigh.  Lateral aspect of thigh is flatten.  Medial aspect of knee has thick scar tissue that was excisionally debrided by Asaf ELLER.  Fully granulated throughout the wound bed.   10/23 wound bed mostly granular, superficial in depth, progress has been slow with the wound VAC so will trial collagen product to encourage new tissue growth.    10/19: wound bed has improved in color and is fully granulated.  Addison-wound has improved, denudation has resolved. APRN continuing with serial weekly debridements as needed.   10/16: wound friable pt now on iv abx per ID.  Indurated edges addressed with drape and foam.  Addison wound likely has yeast infection - addressing with silver powder crusting  10/14: patient seen with Asaf ELLER, stopping veraflo instillation.  Starting silver powder and regular wound VAC to see if it will help with bioburden.  Possible colonization of bacteria.  ID involved.   10/12: Inflammation noted to the proximal part of the wound bed.  Will continue to monitor, possible vac break on Wednesday to help addison-skin inflammation.   10/7: Excisional debridement performed by Asaf ELLER. Will need to continue selective debridement with NPWT changes, and weekly excisional debridement with APRN to flatten out the scar tissue.  IV abx therapy ended 10/5.   10/5: Lateral wound still with some slough, both wounds smaller per measurements. Medial wound with all granular tissue.  9/30:  Patient to start abx therapy for 7 days course per Dr. Ellis.  Started dakin's instillation to veraflo  9/28: malodorous today, culture taken.    9/25: Odor noted, though unclear if from wound or pt, consider shower before next Vac change. Consider regular vac next change, wound granular and superficial.   9/23: Patient still awaiting guardianship for placement.   9/18: wound granulating nicely and responding well to current treatment.    9/16: Wound bed with granular tissue, edges are thick but appear better than previous assessments. . NPWT VF to encourage closure by secondary intention and manage drainage while encouraging oxygenation and granulation to the wound bed. 2 layer compression to assist in decrease of swelling and encourage blood flow to wounds. Wound team to follow up and change 3x/week.      Goals: Steady decrease in wound area and depth weekly.    NURSING PLAN OF CARE ORDERS (X):    Dressing changes: See Dressing Care orders: X  Skin care: See Skin Care orders:   Rectal tube care: See Rectal Tube Care orders:   Other orders:      WOUND TEAM PLAN OF CARE:   Dressing changes by wound team:        Follow up 3 times weekly:                NPWT change 3 times weekly:  X,  NPWT changes 3x/ weekly with 2 layer compression wrap.   Follow up 1-2 times weekly:      Follow up Bi-Monthly:                   Follow up as needed:       Other (explain):     Anticipated discharge plans:  LTACH:        SNF/Rehab: X - patient will need ongoing wound care for LLE upon discharge - 3x/weekly           Home Health Care:           Outpatient Wound Center:            Self Care:

## 2020-12-24 NOTE — CARE PLAN
Problem: Infection  Goal: Will remain free from infection  Outcome: PROGRESSING AS EXPECTED  Followed  by wound care, dressing change done today.      Problem: Pain Management  Goal: Pain level will decrease to patient's comfort goal  Outcome: PROGRESSING AS EXPECTED   Pain controlled with prn and standing medication.

## 2020-12-25 PROCEDURE — A9270 NON-COVERED ITEM OR SERVICE: HCPCS | Performed by: NURSE PRACTITIONER

## 2020-12-25 PROCEDURE — 700111 HCHG RX REV CODE 636 W/ 250 OVERRIDE (IP): Performed by: NURSE PRACTITIONER

## 2020-12-25 PROCEDURE — 700102 HCHG RX REV CODE 250 W/ 637 OVERRIDE(OP): Performed by: NURSE PRACTITIONER

## 2020-12-25 PROCEDURE — 770001 HCHG ROOM/CARE - MED/SURG/GYN PRIV*

## 2020-12-25 PROCEDURE — 99231 SBSQ HOSP IP/OBS SF/LOW 25: CPT | Performed by: NURSE PRACTITIONER

## 2020-12-25 PROCEDURE — 700101 HCHG RX REV CODE 250: Performed by: NURSE PRACTITIONER

## 2020-12-25 RX ADMIN — GABAPENTIN 200 MG: 100 CAPSULE ORAL at 05:12

## 2020-12-25 RX ADMIN — DIVALPROEX SODIUM 125 MG: 125 CAPSULE ORAL at 16:12

## 2020-12-25 RX ADMIN — DIVALPROEX SODIUM 125 MG: 125 CAPSULE ORAL at 05:12

## 2020-12-25 RX ADMIN — OXYCODONE HYDROCHLORIDE 10 MG: 10 TABLET ORAL at 22:54

## 2020-12-25 RX ADMIN — ENOXAPARIN SODIUM 40 MG: 40 INJECTION SUBCUTANEOUS at 05:14

## 2020-12-25 RX ADMIN — DIVALPROEX SODIUM 125 MG: 125 CAPSULE ORAL at 21:28

## 2020-12-25 RX ADMIN — RISPERIDONE 1 MG: 1 TABLET ORAL at 17:40

## 2020-12-25 RX ADMIN — OXYCODONE HYDROCHLORIDE 10 MG: 10 TABLET ORAL at 12:20

## 2020-12-25 RX ADMIN — RISPERIDONE 0.5 MG: 0.5 TABLET ORAL at 05:12

## 2020-12-25 RX ADMIN — GABAPENTIN 200 MG: 100 CAPSULE ORAL at 12:20

## 2020-12-25 RX ADMIN — GABAPENTIN 200 MG: 100 CAPSULE ORAL at 17:40

## 2020-12-25 RX ADMIN — LIDOCAINE 1 PATCH: 50 PATCH CUTANEOUS at 12:20

## 2020-12-25 RX ADMIN — AMLODIPINE BESYLATE 5 MG: 5 TABLET ORAL at 05:11

## 2020-12-25 RX ADMIN — OXYCODONE HYDROCHLORIDE 10 MG: 10 TABLET ORAL at 16:12

## 2020-12-25 ASSESSMENT — PAIN DESCRIPTION - PAIN TYPE
TYPE: SURGICAL PAIN

## 2020-12-25 ASSESSMENT — ENCOUNTER SYMPTOMS
DIARRHEA: 0
DIZZINESS: 0
VOMITING: 0
SHORTNESS OF BREATH: 0
CHILLS: 0
FALLS: 0
EYES NEGATIVE: 1
NAUSEA: 0
WEAKNESS: 0
COUGH: 0
HEADACHES: 0
FEVER: 0
SENSORY CHANGE: 0
INSOMNIA: 0
CONSTIPATION: 0
ABDOMINAL PAIN: 0
DEPRESSION: 1
SPEECH CHANGE: 0
PALPITATIONS: 0
FOCAL WEAKNESS: 0

## 2020-12-25 NOTE — PROGRESS NOTES
Hospital Medicine Twice Weekly Progress Note    Date of Service  12/25/2020    Chief Complaint  LLE wound    Hospital Course  Mr. Varela is a 65-year-old male who presented to the emergency department with a left lower extremity wound infection. He was hospitalized at our facility in April and evaluated by orthopedic surgery who recommended wound care. Wound cultures at that time grew MSSA and Streptococcus. He had been seen at [facility] earlier that week and prescribed antibiotics, however he had not filled the prescription.  An ultrasound of that extremity was done and was negative for DVT.  He was treated for sepsis and completed an antibiotic course on 9/16/2020. He was also found to have head lice and underwent treatment for that.  A repeat wound culture was done on 9/28/2020 and grew Strep A and Proteus. Infectious disease was consulted and recommended a 5-day course of IV zosyn which was completed on 10/5/2020. On 10/12/2020 the wound care team noticed increased inflammation to the proximal part of the wound bed and switched from a vera flow wound VAC to a regular wound VAC.  ID was again consulted and on 10/14/2020 recommended to 7-day course of antibiotics with meropenem which was completed on 10/22/2020. Additionally, he was noted to have intermittent agitation so was started on risperdal, depakote, and gabapentin per psychiatric recommendations.  On 11/20/2020 he underwent left lower extremity debridement with wound VAC placement.  Since then he has remained stable.  He has been deemed incapacitated to make medical decisions and is currently pending guardianship (which he is contesting) and placement, likely to a group home.   Court date now scheduled for 1/29/2021 at 10 AM    Interval Problem Update  12/22:  Patient resting comfortably in bed.  Wound vac intact.  Pain controlled.  VSS.  Afebrile.   12/25: Patient sitting up in bed drinking coffee.  Wound VAC intact.  Asked patient if he has  "any needs and he states yes \"some Mac [employer]am and orange juice\".  He does appear to be in a pleasant mood today.  No evidence of acute distress or discomfort.    Consultants/Specialty  -LPS  -ID  -Psychiatry    Code Status  Full Code    Disposition  Pending Guardianship     Review of Systems  Review of Systems   Constitutional: Negative for chills, fever and malaise/fatigue.   Eyes: Negative.    Respiratory: Negative for cough and shortness of breath.    Cardiovascular: Positive for leg swelling. Negative for chest pain and palpitations.   Gastrointestinal: Negative for abdominal pain, constipation, diarrhea, nausea and vomiting.   Genitourinary: Negative for dysuria, frequency and urgency.   Musculoskeletal: Negative for falls.   Skin: Negative for itching.   Neurological: Negative for dizziness, sensory change, speech change, focal weakness, weakness and headaches.   Psychiatric/Behavioral: Positive for depression (sometimes). The patient does not have insomnia. Nervous/anxious: sometimes.    All other systems reviewed and are negative.       Physical Exam  Temp:  [36.3 °C (97.4 °F)-36.6 °C (97.8 °F)] 36.4 °C (97.6 °F)  Pulse:  [62-82] 82  Resp:  [16] 16  BP: (111-115)/(63-78) 111/78  SpO2:  [94 %-95 %] 94 %    Physical Exam  Vitals signs and nursing note reviewed.   Constitutional:       General: He is sleeping. He is not in acute distress.     Appearance: He is underweight. He is not ill-appearing.   HENT:      Head: Normocephalic and atraumatic.      Nose: Nose normal. No congestion.      Mouth/Throat:      Lips: Pink.      Mouth: Mucous membranes are moist.      Pharynx: No oropharyngeal exudate.   Eyes:      General:         Right eye: No discharge.         Left eye: No discharge.      Conjunctiva/sclera: Conjunctivae normal.   Neck:      Musculoskeletal: Normal range of motion.   Cardiovascular:      Rate and Rhythm: Normal rate.   Pulmonary:      Effort: Pulmonary effort is normal. No respiratory distress. "   Abdominal:      General: Bowel sounds are normal. There is no distension.      Tenderness: There is no abdominal tenderness.   Musculoskeletal:      Comments: GATES   Skin:     General: Skin is warm and dry.      Comments: Wound Vac intact/functioning appropriately to left leg    Neurological:      Mental Status: He is easily aroused. Mental status is at baseline.      Comments: Oriented to himself and hospital   Psychiatric:         Mood and Affect: Mood normal. Affect is labile.         Behavior: Behavior is not aggressive or combative. Behavior is cooperative.         Cognition and Memory: Cognition is impaired. He exhibits impaired recent memory.         Judgment: Judgment is impulsive.      Comments:            Fluids    Intake/Output Summary (Last 24 hours) at 12/25/2020 1308  Last data filed at 12/25/2020 0900  Gross per 24 hour   Intake 660 ml   Output --   Net 660 ml       Laboratory                        Imaging  IR-US GUIDED PIV   Final Result    Ultrasound-guided PERIPHERAL IV INSERTION performed by    qualified nursing staff as above.      IR-MIDLINE CATHETER INSERTION WO GUIDANCE > AGE 3   Final Result                  Ultrasound-guided midline placement performed by qualified nursing staff    as above.          IR-US GUIDED PIV   Final Result    Ultrasound-guided PERIPHERAL IV INSERTION performed by    qualified nursing staff as above.      IR-MIDLINE CATHETER INSERTION WO GUIDANCE > AGE 3   Final Result                  Ultrasound-guided midline placement performed by qualified nursing staff    as above.          US-KOSTAS SINGLE LEVEL BILAT   Final Result      CT-HEAD W/O   Final Result      No acute intracranial abnormality      DX-TIBIA AND FIBULA LEFT   Final Result      1.  Healed distal metaphyseal fractures of the left fibula and tibia with tibial IM layla in place.      2.  No acute fracture or dislocation.      3.  No radiopaque soft tissue foreign body.      DX-FEMUR-2+ LEFT   Final Result       1.  No radiographic evidence of acute traumatic injury left femur.      2.  Unchanged cortical thickening in the posterior aspect of the distal femoral diaphysis which may represent sequela of prior injury or infection.      3.  No soft tissue foreign body identified.      US-EXTREMITY VENOUS LOWER UNILAT LEFT   Final Result      DX-CHEST-PORTABLE (1 VIEW)   Final Result      No acute cardiopulmonary abnormality.           Assessment/Plan  * Infection of wound due to methicillin resistant Staphylococcus aureus (MRSA)- (present on admission)  Assessment & Plan  -History of MRSA.  -Poor compliance with OP wound care. Poor compliance with wound vac at times.   -Continue pain control as needed.  -LPS and wound care following -debridements and wound VAC changes per their recommendations  -Cultures repeated 12/14- show positive for Proteus species, pan sensitive.  Complete regimen of augmentin.         Encephalopathy- (present on admission)  Assessment & Plan  -Acute on chronic, likely due Wernicke's. Improved.   -Per psychiatry and Dr. Velázquez: Patient is incapacitated.  -Guardianship and placement pending.  Patient is contesting guardianship, hearing is now scheduled for 1/29/2021 at 10 AM.    Psychiatric disorder- (present on admission)  Assessment & Plan  -Unspecified.  -Continue Risperdal and Depakote.  -Psychiatry has consulted and deemed him incapacitated to leave AMA or make medical decisions  -Pending guardianship, which he is contesting.    Essential hypertension- (present on admission)  Assessment & Plan  -Controlled on amlodipine    Flexion contracture of knee, 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)  Assessment & Plan  -Secondary to chronic wound in that area.  -PT/OT.  -Does not seem to limit his mobility.  Is frequently ambulatory.    Emphysema/COPD (HCC)- (present on admission)  Assessment & Plan  -Chronic and stable, without acute exacerbation.    Alcohol dependence (HCC)- (present on admission)  Assessment &  Plan  -History of. Abstinent since admission.    Protein malnutrition (HCC)- (present on admission)  Assessment & Plan  -Body mass index is 21.35 kg/m².   -Continue TID supplements.  -Encourage PO intake.    Tobacco dependence- (present on admission)  Assessment & Plan  -Abstinent since admission.       VTE prophylaxis: Ambulatory      I have performed a physical exam and reviewed and updated ROS and Assessment/Plan today 12/25/2020. In review of the previous note there are no changes except as documented above        LOUISE Marroquin.

## 2020-12-25 NOTE — CARE PLAN
Problem: Safety  Goal: Will remain free from injury  Outcome: PROGRESSING AS EXPECTED   Bed locked and in lowest position, call light and personal belongings within reach, treaded socks in place, hourly rounding on going.    Problem: Infection  Goal: Will remain free from infection  Outcome: PROGRESSING AS EXPECTED   Standard precautions in place. Wound team following patient for wound vac dressing change.

## 2020-12-26 PROCEDURE — A9270 NON-COVERED ITEM OR SERVICE: HCPCS | Performed by: NURSE PRACTITIONER

## 2020-12-26 PROCEDURE — 700102 HCHG RX REV CODE 250 W/ 637 OVERRIDE(OP): Performed by: NURSE PRACTITIONER

## 2020-12-26 PROCEDURE — 770001 HCHG ROOM/CARE - MED/SURG/GYN PRIV*

## 2020-12-26 PROCEDURE — 700111 HCHG RX REV CODE 636 W/ 250 OVERRIDE (IP): Performed by: NURSE PRACTITIONER

## 2020-12-26 PROCEDURE — 700101 HCHG RX REV CODE 250: Performed by: NURSE PRACTITIONER

## 2020-12-26 RX ADMIN — LIDOCAINE 1 PATCH: 50 PATCH CUTANEOUS at 13:14

## 2020-12-26 RX ADMIN — RISPERIDONE 1 MG: 1 TABLET ORAL at 18:31

## 2020-12-26 RX ADMIN — GABAPENTIN 200 MG: 100 CAPSULE ORAL at 18:31

## 2020-12-26 RX ADMIN — OXYCODONE HYDROCHLORIDE 10 MG: 10 TABLET ORAL at 19:44

## 2020-12-26 RX ADMIN — CYCLOBENZAPRINE 10 MG: 10 TABLET, FILM COATED ORAL at 21:41

## 2020-12-26 RX ADMIN — RISPERIDONE 0.5 MG: 0.5 TABLET ORAL at 06:18

## 2020-12-26 RX ADMIN — OXYCODONE HYDROCHLORIDE 10 MG: 10 TABLET ORAL at 06:19

## 2020-12-26 RX ADMIN — AMLODIPINE BESYLATE 5 MG: 5 TABLET ORAL at 06:19

## 2020-12-26 RX ADMIN — DIVALPROEX SODIUM 125 MG: 125 CAPSULE ORAL at 21:41

## 2020-12-26 RX ADMIN — OXYCODONE HYDROCHLORIDE 10 MG: 10 TABLET ORAL at 13:15

## 2020-12-26 RX ADMIN — GABAPENTIN 200 MG: 100 CAPSULE ORAL at 06:18

## 2020-12-26 RX ADMIN — GABAPENTIN 200 MG: 100 CAPSULE ORAL at 13:15

## 2020-12-26 RX ADMIN — DIVALPROEX SODIUM 125 MG: 125 CAPSULE ORAL at 13:15

## 2020-12-26 RX ADMIN — DIVALPROEX SODIUM 125 MG: 125 CAPSULE ORAL at 06:18

## 2020-12-26 RX ADMIN — ENOXAPARIN SODIUM 40 MG: 40 INJECTION SUBCUTANEOUS at 06:18

## 2020-12-26 ASSESSMENT — PAIN DESCRIPTION - PAIN TYPE
TYPE: SURGICAL PAIN
TYPE: SURGICAL PAIN

## 2020-12-26 NOTE — CARE PLAN
Problem: Knowledge Deficit  Goal: Knowledge of disease process/condition, treatment plan, diagnostic tests, and medications will improve  Outcome: PROGRESSING SLOWER THAN EXPECTED  Note: Wound vac in place to assist in wound healing      Problem: Discharge Barriers/Planning  Goal: Patient's continuum of care needs will be met  Outcome: PROGRESSING SLOWER THAN EXPECTED  Note: Guardianship hearing 1/29, will need cog eval on 1/25

## 2020-12-27 PROCEDURE — 700102 HCHG RX REV CODE 250 W/ 637 OVERRIDE(OP): Performed by: NURSE PRACTITIONER

## 2020-12-27 PROCEDURE — 97606 NEG PRS WND THER DME>50 SQCM: CPT

## 2020-12-27 PROCEDURE — 700111 HCHG RX REV CODE 636 W/ 250 OVERRIDE (IP): Performed by: NURSE PRACTITIONER

## 2020-12-27 PROCEDURE — A9270 NON-COVERED ITEM OR SERVICE: HCPCS | Performed by: NURSE PRACTITIONER

## 2020-12-27 PROCEDURE — 770001 HCHG ROOM/CARE - MED/SURG/GYN PRIV*

## 2020-12-27 PROCEDURE — 700101 HCHG RX REV CODE 250: Performed by: NURSE PRACTITIONER

## 2020-12-27 RX ADMIN — RISPERIDONE 1 MG: 1 TABLET ORAL at 17:48

## 2020-12-27 RX ADMIN — LIDOCAINE 1 PATCH: 50 PATCH CUTANEOUS at 13:15

## 2020-12-27 RX ADMIN — ENOXAPARIN SODIUM 40 MG: 40 INJECTION SUBCUTANEOUS at 06:27

## 2020-12-27 RX ADMIN — DIVALPROEX SODIUM 125 MG: 125 CAPSULE ORAL at 22:15

## 2020-12-27 RX ADMIN — DIVALPROEX SODIUM 125 MG: 125 CAPSULE ORAL at 06:27

## 2020-12-27 RX ADMIN — OXYCODONE HYDROCHLORIDE 10 MG: 10 TABLET ORAL at 06:27

## 2020-12-27 RX ADMIN — AMLODIPINE BESYLATE 5 MG: 5 TABLET ORAL at 06:27

## 2020-12-27 RX ADMIN — RISPERIDONE 0.5 MG: 0.5 TABLET ORAL at 06:27

## 2020-12-27 RX ADMIN — DIVALPROEX SODIUM 125 MG: 125 CAPSULE ORAL at 13:15

## 2020-12-27 RX ADMIN — GABAPENTIN 200 MG: 100 CAPSULE ORAL at 13:14

## 2020-12-27 RX ADMIN — OXYCODONE HYDROCHLORIDE 10 MG: 10 TABLET ORAL at 13:17

## 2020-12-27 RX ADMIN — OXYCODONE HYDROCHLORIDE 10 MG: 10 TABLET ORAL at 22:15

## 2020-12-27 RX ADMIN — GABAPENTIN 200 MG: 100 CAPSULE ORAL at 17:48

## 2020-12-27 RX ADMIN — GABAPENTIN 200 MG: 100 CAPSULE ORAL at 06:27

## 2020-12-27 NOTE — WOUND TEAM
[facility]  Inpatient Services  Wound and Skin Care Progress Note    Admission Date: 8/7/2020     Last order of IP CONSULT TO WOUND CARE was found on 9/1/2020 from Hospital Encounter on 8/7/2020     HPI, PMH, SH: Reviewed    Unit where seen by Wound Team: [id number]    WOUND CONSULT/FOLLOW UP RELATED TO:  Left leg follow-up    Self Report / Pain Level: pt slept through most of wound care    OBJECTIVE:  2 layer wrap intact, patient walking in room with vac on pole.    WOUND TYPE, LOCATION, CHARACTERISTICS (Pressure Injuries: location, stage, POA or date identified)    Negative Pressure Wound Therapy 11/20/20 Leg Lateral;Posterior;Medial Left (Active)   NPWT Pump Mode / Pressure Setting Ulta;Continuous;125 mmHg 12/27/20 1200   Dressing Type Medium;Silver impregnated foam 12/27/20 1200   Number of Foam Pieces Used 3 12/27/20 1200   Canister Changed No 12/27/20 1200   Output (mL) 50 mL 12/06/20 0603   NEXT Dressing Change/Treatment Date 12/29/20 12/27/20 1200   WOUND NURSE ONLY - Time Spent with Patient (mins) 100 12/14/20 1400           Wound 11/19/20 Full Thickness Wound Leg Lateral;Posterior;Medial Left (Active)   Wound Image    12/24/20 0945   Site Assessment Red;Paoli 12/27/20 1200   Periwound Assessment Clean;Dry;Intact;Scar tissue 12/27/20 1200   Margins Defined edges;Attached edges 12/27/20 1200   Closure Secondary intention 12/27/20 1200   Drainage Amount Large 12/27/20 1200   Drainage Description Serosanguineous 12/27/20 1200   Treatments Cleansed;Irrigation;Site care 12/27/20 1200   Wound Cleansing Approved Wound Cleanser 12/27/20 1200   Periwound Protectant Skin Protectant Wipes to Periwound;Drape;Antifungal Therapy 12/27/20 1200   Dressing Cleansing/Solutions Not Applicable 12/27/20 1200   Dressing Options Wound Vac;Compression Wrap Two Layer 12/27/20 1200   Dressing Changed Changed 12/27/20 1200   Dressing Status Clean;Dry;Intact 12/27/20 1200   Dressing Change/Treatment Frequency By Wound Team  Only 20 1200   NEXT Dressing Change/Treatment Date 20 1200   NEXT Weekly Photo (Inpatient Only) 20 1200   Non-staged Wound Description Full thickness 20 1200   Wound Bed Granulation (%) 90 % 20 1600   Wound Bed Slough (%) 40 % 20 1300   Wound Bed Granulation (%) - Post-Procedure 100 % 20 1300   Shape linear 20 0945   Wound Odor Mild 20 0945   Pulses Left;2+;DP;PT 20 0945   Exposed Structures None 20 09   WOUND NURSE ONLY - Time Spent with Patient (mins) 90 20 1600           Wounds are no longer connected - measured as 2 wounds - posterior thigh and medial leg.        VASCULAR    CESAR:   CESAR Results, Last 30 Days Us-cesar Single Level Bilat    Result Date: 2020  Narrative  Vascular Laboratory  Conclusions  1.  Normal bilateral CESAR's.  GRUPO GANN  Age:    64    Gender:     M  MRN:    [id number]  :    1955      BSA:  Exam Date:     2020 09:59  Room #:     Inpatient  Priority:     Routine  Ht (in):             Wt (lb):  Ordering Physician:        EDDA CANADA  Referring Physician:       [id number] Morrissey  Sonographer:               Deja Ellis RDMS                             T  Study Type:                Complete Bilateral  Technical Quality:         Adequate  Indications:     Ulceration of LE  CPT Codes:       28691  ICD Codes:       707.1  History:         Nonhealing wound of left lower extremity.  Limitations:                 RIGHT  Waveform            Systolic BPs (mmHg)                             117           Brachial  Triphasic                                Common Femoral  Triphasic                  138           Posterior Tibial  Triphasic                  132           Dorsalis Pedis                                           Peroneal                             1.18          CESAR                                           TBI                        LEFT  Waveform        Systolic BPs (mmHg)                             114           Brachial  Triphasic                                Common Femoral  Triphasic                  127           Posterior Tibial  Triphasic                  122           Dorsalis Pedis                                           Peroneal                             1.09          KOSTAS                                           TBI  Findings  Right.  Doppler waveforms of the common femoral artery are of high amplitude and  triphasic.  Doppler waveforms at the ankle are brisk and triphasic.  Ankle-brachial index is normal.  Left.  Doppler waveforms of the common femoral artery are of high amplitude and  triphasic.  Doppler waveforms at the ankle are brisk and triphasic.  Ankle-brachial index is normal.  Unable to obtained popliteal waveforms due to wound bandages.  Additional testing was not performed in accordance with lower extremity  arterial evaluation protocol.  Clover Maya  (Electronically Signed)  Final Date:      02 September 2020                   11:14    Lab Values:    Lab Results   Component Value Date/Time    WBC 6.5 11/22/2020 02:44 AM    RBC 3.54 (L) 11/22/2020 02:44 AM    HEMOGLOBIN 9.9 (L) 11/22/2020 02:44 AM    HEMATOCRIT 30.5 (L) 11/22/2020 02:44 AM    CREACTPROT 1.07 (H) 08/12/2020 01:55 PM    SEDRATEWES 85 (H) 08/07/2020 01:20 PM    HBA1C 5.7 (H) 11/09/2018 06:30 PM        Culture Results show:  Recent Results (from the past 720 hour(s))   CULTURE WOUND W/ GRAM STAIN    Collection Time: 09/01/20  2:00 PM    Specimen: Left Leg; Wound   Result Value Ref Range    Significant Indicator POS (POS)     Source WND     Site LEFT LEG     Culture Result - (A)     Gram Stain Result       Moderate Gram positive cocci.  Moderate Gram positive rods.      Culture Result (A)      Beta Hemolytic Streptococcus group A  Moderate growth      Culture Result (A)      Methicillin Resistant Staphylococcus aureus  Moderate  growth      Culture Result (A)      Diphtheroids  Moderate growth  Mixed morphologies.         INTERVENTIONS BY WOUND TEAM:    Dressings removed cleansed wound bed with wound cleanser.  Meredith-wound cleansed with wound cleanser .    Sharp debrided with scalpel 40 cm2 yellow slough and wound cleansed again.  Meredith-wound skin protected with benzoin and dermatac drape.  .  Nystatin to wound bed, Silver foam to all of wound bed, secured with regular drape.  TRAC pad placed.  NPWT resumed, no leaks noted.  .  Fan folded foam padding behind knee crease, and 2 layer compression wrap to the left leg and thigh.   NPWT to 150mmHg due to drainage.      Interdisciplinary consultation: Patient, Bedside RN    EVALUATION / RATIONALE FOR TREATMENT:    12/27/20: Wound bed granular and odor has lessen but still persists.   12/18/20 wound length decreased.  Odor persists.    12/14/2020: re-cultured wound bed.  12/11/2020 POD 23 Length of wounds has decreased, width has not changed.  Odor persists.  Pt continues to become angry with VAC when it limits his mobility.    12/7/2020 POD 19 odor persists, improved wound bed after debridement.  Possible bacterial vs yeast vs fungal infection.  Culture taken.  Nystatin to treat possible fungal infection, silver foam to decrease microbial population.    12/4/2020 POD# 16 odor worsening, more yellow tissue present, will add nystatin and silver foam next week and consider a culture  11/30 POD#11 granular buds visible to wound bed.  Same as prior.   11/27 POD#8 wound is odorous likely related to biologic dressing.  No overt signs of infection.  Granular buds are visible through adaptic.  Edges do not appear as thick as they were prior to last sx.  Continue with current treatment.    11/23: POD#4 s/p I&D of left LE wounds and biologic placed by Dr. Olvera on 11/19.  Wound bed is flatter and raised edges scar tissue seems to be gone.   11/14 Posterior thigh aspect of wound deteriorating, will increase  frequency of treatment to keep biofilm down and hopefully avoid systemic abx.  Will include thigh in compression wrap.  Will consider culture if improvement is not seen in the next few treatments.    11/10: APRN unavailable at this time.  Will re-schedule excisional debridement to next week.   11/5: Plan for debridement of scarred edges on Tues by LPS. Tendon exposed to posterior aspect of wound, behind knee. Continue with current dressing care.  10/29: Excisional debridement by Asaf ELLER of scar tissue along the proximal edge of the posterior knee and lateral thigh.  Lateral aspect of thigh is flatten.  Medial aspect of knee has thick scar tissue that was excisionally debrided by Asaf ELLER.  Fully granulated throughout the wound bed.   10/23 wound bed mostly granular, superficial in depth, progress has been slow with the wound VAC so will trial collagen product to encourage new tissue growth.    10/19: wound bed has improved in color and is fully granulated.  Addison-wound has improved, denudation has resolved. APRN continuing with serial weekly debridements as needed.   10/16: wound friable pt now on iv abx per ID.  Indurated edges addressed with drape and foam.  Addison wound likely has yeast infection - addressing with silver powder crusting  10/14: patient seen with Asaf ELLER, stopping veraflo instillation.  Starting silver powder and regular wound VAC to see if it will help with bioburden.  Possible colonization of bacteria.  ID involved.   10/12: Inflammation noted to the proximal part of the wound bed.  Will continue to monitor, possible vac break on Wednesday to help addison-skin inflammation.   10/7: Excisional debridement performed by Asaf ELLER. Will need to continue selective debridement with NPWT changes, and weekly excisional debridement with APRN to flatten out the scar tissue.  IV abx therapy ended 10/5.   10/5: Lateral wound still with some slough, both wounds smaller per  measurements. Medial wound with all granular tissue.  9/30: Patient to start abx therapy for 7 days course per Dr. Ellis.  Started dakin's instillation to veraflo  9/28: malodorous today, culture taken.    9/25: Odor noted, though unclear if from wound or pt, consider shower before next Vac change. Consider regular vac next change, wound granular and superficial.   9/23: Patient still awaiting guardianship for placement.   9/18: wound granulating nicely and responding well to current treatment.    9/16: Wound bed with granular tissue, edges are thick but appear better than previous assessments. . NPWT VF to encourage closure by secondary intention and manage drainage while encouraging oxygenation and granulation to the wound bed. 2 layer compression to assist in decrease of swelling and encourage blood flow to wounds. Wound team to follow up and change 3x/week.      Goals: Steady decrease in wound area and depth weekly.    NURSING PLAN OF CARE ORDERS (X):    Dressing changes: See Dressing Care orders: X  Skin care: See Skin Care orders:   Rectal tube care: See Rectal Tube Care orders:   Other orders:      WOUND TEAM PLAN OF CARE:   Dressing changes by wound team:        Follow up 3 times weekly:                NPWT change 3 times weekly:  X,  NPWT changes 3x/ weekly with 2 layer compression wrap.   Follow up 1-2 times weekly:      Follow up Bi-Monthly:                   Follow up as needed:       Other (explain):     Anticipated discharge plans:  LTACH:        SNF/Rehab: X - patient will need ongoing wound care for LLE upon discharge - 3x/weekly           Home Health Care:           Outpatient Wound Center:            Self Care:

## 2020-12-27 NOTE — WOUND TEAM
Spoke with Teri in micro, the culture from 12/14 is still being run for fungus.  Teri will have one of the micro  follow up with me to confirm status of culture.

## 2020-12-28 PROCEDURE — 770001 HCHG ROOM/CARE - MED/SURG/GYN PRIV*

## 2020-12-28 PROCEDURE — A9270 NON-COVERED ITEM OR SERVICE: HCPCS | Performed by: NURSE PRACTITIONER

## 2020-12-28 PROCEDURE — 700102 HCHG RX REV CODE 250 W/ 637 OVERRIDE(OP): Performed by: NURSE PRACTITIONER

## 2020-12-28 PROCEDURE — 700101 HCHG RX REV CODE 250: Performed by: NURSE PRACTITIONER

## 2020-12-28 PROCEDURE — 700111 HCHG RX REV CODE 636 W/ 250 OVERRIDE (IP): Performed by: NURSE PRACTITIONER

## 2020-12-28 RX ADMIN — GABAPENTIN 200 MG: 100 CAPSULE ORAL at 06:36

## 2020-12-28 RX ADMIN — ENOXAPARIN SODIUM 40 MG: 40 INJECTION SUBCUTANEOUS at 06:36

## 2020-12-28 RX ADMIN — AMLODIPINE BESYLATE 5 MG: 5 TABLET ORAL at 06:36

## 2020-12-28 RX ADMIN — OXYCODONE HYDROCHLORIDE 10 MG: 10 TABLET ORAL at 12:57

## 2020-12-28 RX ADMIN — LIDOCAINE 1 PATCH: 50 PATCH CUTANEOUS at 12:57

## 2020-12-28 RX ADMIN — OXYCODONE HYDROCHLORIDE 10 MG: 10 TABLET ORAL at 21:04

## 2020-12-28 RX ADMIN — RISPERIDONE 1 MG: 1 TABLET ORAL at 16:37

## 2020-12-28 RX ADMIN — GABAPENTIN 200 MG: 100 CAPSULE ORAL at 12:57

## 2020-12-28 RX ADMIN — DIVALPROEX SODIUM 125 MG: 125 CAPSULE ORAL at 12:56

## 2020-12-28 RX ADMIN — OXYCODONE HYDROCHLORIDE 10 MG: 10 TABLET ORAL at 06:36

## 2020-12-28 RX ADMIN — DIVALPROEX SODIUM 125 MG: 125 CAPSULE ORAL at 21:04

## 2020-12-28 RX ADMIN — DIVALPROEX SODIUM 125 MG: 125 CAPSULE ORAL at 06:36

## 2020-12-28 RX ADMIN — GABAPENTIN 200 MG: 100 CAPSULE ORAL at 16:37

## 2020-12-28 RX ADMIN — RISPERIDONE 0.5 MG: 0.5 TABLET ORAL at 06:36

## 2020-12-28 ASSESSMENT — PAIN DESCRIPTION - PAIN TYPE
TYPE: CHRONIC PAIN

## 2020-12-28 NOTE — PROGRESS NOTES
Wound vac detected a blockage this morning. Called wound team on how to fix the blockage. This RN was instructed by the wound team to remove wound vac, apply a wet to dry dressing with coban, & that the wound team will replace wound vac tomorrow (12/29). Patient tolerated dressing change well & updated on POC.

## 2020-12-29 PROCEDURE — A9270 NON-COVERED ITEM OR SERVICE: HCPCS | Performed by: NURSE PRACTITIONER

## 2020-12-29 PROCEDURE — 770001 HCHG ROOM/CARE - MED/SURG/GYN PRIV*

## 2020-12-29 PROCEDURE — 700102 HCHG RX REV CODE 250 W/ 637 OVERRIDE(OP): Performed by: NURSE PRACTITIONER

## 2020-12-29 PROCEDURE — 700101 HCHG RX REV CODE 250: Performed by: NURSE PRACTITIONER

## 2020-12-29 PROCEDURE — 700111 HCHG RX REV CODE 636 W/ 250 OVERRIDE (IP): Performed by: NURSE PRACTITIONER

## 2020-12-29 RX ADMIN — RISPERIDONE 0.5 MG: 0.5 TABLET ORAL at 04:18

## 2020-12-29 RX ADMIN — OXYCODONE HYDROCHLORIDE 10 MG: 10 TABLET ORAL at 21:00

## 2020-12-29 RX ADMIN — HYDROXYZINE HYDROCHLORIDE 25 MG: 50 TABLET, FILM COATED ORAL at 10:25

## 2020-12-29 RX ADMIN — GABAPENTIN 200 MG: 100 CAPSULE ORAL at 17:15

## 2020-12-29 RX ADMIN — DIVALPROEX SODIUM 125 MG: 125 CAPSULE ORAL at 16:25

## 2020-12-29 RX ADMIN — GABAPENTIN 200 MG: 100 CAPSULE ORAL at 11:34

## 2020-12-29 RX ADMIN — LIDOCAINE 1 PATCH: 50 PATCH CUTANEOUS at 11:35

## 2020-12-29 RX ADMIN — GABAPENTIN 200 MG: 100 CAPSULE ORAL at 04:18

## 2020-12-29 RX ADMIN — OXYCODONE HYDROCHLORIDE 10 MG: 10 TABLET ORAL at 04:18

## 2020-12-29 RX ADMIN — OXYCODONE HYDROCHLORIDE 10 MG: 10 TABLET ORAL at 11:35

## 2020-12-29 RX ADMIN — AMLODIPINE BESYLATE 5 MG: 5 TABLET ORAL at 04:18

## 2020-12-29 RX ADMIN — DIVALPROEX SODIUM 125 MG: 125 CAPSULE ORAL at 04:18

## 2020-12-29 RX ADMIN — RISPERIDONE 1 MG: 1 TABLET ORAL at 17:16

## 2020-12-29 RX ADMIN — DIVALPROEX SODIUM 125 MG: 125 CAPSULE ORAL at 21:00

## 2020-12-29 RX ADMIN — ENOXAPARIN SODIUM 40 MG: 40 INJECTION SUBCUTANEOUS at 04:18

## 2020-12-29 ASSESSMENT — PAIN DESCRIPTION - PAIN TYPE
TYPE: CHRONIC PAIN

## 2020-12-30 PROCEDURE — A9270 NON-COVERED ITEM OR SERVICE: HCPCS | Performed by: NURSE PRACTITIONER

## 2020-12-30 PROCEDURE — 97598 DBRDMT OPN WND ADDL 20CM/<: CPT

## 2020-12-30 PROCEDURE — 700102 HCHG RX REV CODE 250 W/ 637 OVERRIDE(OP): Performed by: NURSE PRACTITIONER

## 2020-12-30 PROCEDURE — 97597 DBRDMT OPN WND 1ST 20 CM/<: CPT

## 2020-12-30 PROCEDURE — 770001 HCHG ROOM/CARE - MED/SURG/GYN PRIV*

## 2020-12-30 PROCEDURE — 700111 HCHG RX REV CODE 636 W/ 250 OVERRIDE (IP): Performed by: NURSE PRACTITIONER

## 2020-12-30 PROCEDURE — 700101 HCHG RX REV CODE 250: Performed by: NURSE PRACTITIONER

## 2020-12-30 PROCEDURE — 99231 SBSQ HOSP IP/OBS SF/LOW 25: CPT | Performed by: NURSE PRACTITIONER

## 2020-12-30 RX ADMIN — OXYCODONE HYDROCHLORIDE 10 MG: 10 TABLET ORAL at 20:31

## 2020-12-30 RX ADMIN — OXYCODONE HYDROCHLORIDE 10 MG: 10 TABLET ORAL at 06:04

## 2020-12-30 RX ADMIN — RISPERIDONE 0.5 MG: 0.5 TABLET ORAL at 06:04

## 2020-12-30 RX ADMIN — AMLODIPINE BESYLATE 5 MG: 5 TABLET ORAL at 06:03

## 2020-12-30 RX ADMIN — DIVALPROEX SODIUM 125 MG: 125 CAPSULE ORAL at 13:00

## 2020-12-30 RX ADMIN — LIDOCAINE 1 PATCH: 50 PATCH CUTANEOUS at 13:00

## 2020-12-30 RX ADMIN — DIVALPROEX SODIUM 125 MG: 125 CAPSULE ORAL at 06:03

## 2020-12-30 RX ADMIN — ENOXAPARIN SODIUM 40 MG: 40 INJECTION SUBCUTANEOUS at 06:04

## 2020-12-30 RX ADMIN — GABAPENTIN 200 MG: 100 CAPSULE ORAL at 06:03

## 2020-12-30 RX ADMIN — RISPERIDONE 1 MG: 1 TABLET ORAL at 17:38

## 2020-12-30 RX ADMIN — GABAPENTIN 200 MG: 100 CAPSULE ORAL at 13:00

## 2020-12-30 RX ADMIN — CYCLOBENZAPRINE 10 MG: 10 TABLET, FILM COATED ORAL at 00:33

## 2020-12-30 RX ADMIN — GABAPENTIN 200 MG: 100 CAPSULE ORAL at 17:38

## 2020-12-30 RX ADMIN — OXYCODONE HYDROCHLORIDE 10 MG: 10 TABLET ORAL at 13:00

## 2020-12-30 RX ADMIN — DIVALPROEX SODIUM 125 MG: 125 CAPSULE ORAL at 20:31

## 2020-12-30 ASSESSMENT — ENCOUNTER SYMPTOMS
DIARRHEA: 0
INSOMNIA: 0
NERVOUS/ANXIOUS: 0
DEPRESSION: 0
NAUSEA: 0
SPEECH CHANGE: 0
FEVER: 0
COUGH: 0
HEADACHES: 0
ABDOMINAL PAIN: 0
DIZZINESS: 0
WEAKNESS: 0
FOCAL WEAKNESS: 0
SHORTNESS OF BREATH: 0
SENSORY CHANGE: 0
CONSTIPATION: 0
VOMITING: 0

## 2020-12-30 ASSESSMENT — PAIN DESCRIPTION - PAIN TYPE
TYPE: ACUTE PAIN
TYPE: ACUTE PAIN

## 2020-12-30 NOTE — PROGRESS NOTES
Hospital Medicine Twice Weekly Progress Note    Date of Service  12/30/2020    Chief Complaint  LLE wound    Hospital Course  Mr. Varela is a 65-year-old male who presented to the emergency department with a left lower extremity wound infection. He was hospitalized at our facility in April and evaluated by orthopedic surgery who recommended wound care. Wound cultures at that time grew MSSA and Streptococcus. He had been seen at [facility] earlier that week and prescribed antibiotics, however he had not filled the prescription.  An ultrasound of that extremity was done and was negative for DVT.  He was treated for sepsis and completed an antibiotic course on 9/16/2020. He was also found to have head lice and underwent treatment for that.  A repeat wound culture was done on 9/28/2020 and grew Strep A and Proteus. Infectious disease was consulted and recommended a 5-day course of IV zosyn which was completed on 10/5/2020. On 10/12/2020 the wound care team noticed increased inflammation to the proximal part of the wound bed and switched from a vera flow wound VAC to a regular wound VAC.  ID was again consulted and on 10/14/2020 recommended to 7-day course of antibiotics with meropenem which was completed on 10/22/2020. Additionally, he was noted to have intermittent agitation so was started on risperdal, depakote, and gabapentin per psychiatric recommendations.  On 11/20/2020 he underwent left lower extremity debridement with wound VAC placement. Since then he has remained stable.  He has been deemed incapacitated to make medical decisions and is currently pending guardianship (which he is contesting) and placement, likely to a group home. His court date is now scheduled for 1/29/2021 at 10 a.m.      Interval Problem Update  Feeling good today. Denies pain, which he was excited about. Wound vac no longer present. Appears to be in good spirits today. Is happy & smiling. Very cooperative.     Afebrile, HR  60s-90s, SBP 100s-120s, O2 sats WNL on room air.     Consultants/Specialty  LPS  ID  Psychiatry    Code Status  Full Code    Disposition  Contesting guardianship, hearing is 1/29/2021.     Review of Systems  Review of Systems   Constitutional: Negative for fever and malaise/fatigue.   Respiratory: Negative for cough and shortness of breath.    Cardiovascular: Negative for chest pain and leg swelling.   Gastrointestinal: Negative for abdominal pain, constipation, diarrhea, nausea and vomiting.   Genitourinary: Negative for dysuria, frequency and urgency.   Neurological: Negative for dizziness, sensory change, speech change, focal weakness, weakness and headaches.   Psychiatric/Behavioral: Negative for depression. The patient is not nervous/anxious and does not have insomnia.    All other systems reviewed and are negative.     Physical Exam  Temp:  [36.2 °C (97.1 °F)-36.8 °C (98.3 °F)] 36.8 °C (98.3 °F)  Pulse:  [63-81] 81  Resp:  [15-16] 16  BP: (101-129)/(67-84) 103/67  SpO2:  [94 %-97 %] 94 %    Physical Exam  Vitals signs and nursing note reviewed.   Constitutional:       General: He is awake. He is not in acute distress.     Appearance: Normal appearance. He is well-developed. He is not ill-appearing.   HENT:      Head: Normocephalic and atraumatic.      Mouth/Throat:      Lips: Pink.      Mouth: Mucous membranes are moist.   Eyes:      Conjunctiva/sclera: Conjunctivae normal.      Pupils: Pupils are equal, round, and reactive to light.   Neck:      Musculoskeletal: Normal range of motion and neck supple.   Cardiovascular:      Rate and Rhythm: Normal rate and regular rhythm.      Pulses: Normal pulses.      Heart sounds: Normal heart sounds.   Pulmonary:      Effort: Pulmonary effort is normal.      Breath sounds: Normal breath sounds.   Abdominal:      General: Bowel sounds are decreased. There is no distension or abdominal bruit.      Palpations: Abdomen is soft.      Tenderness: There is no abdominal  tenderness.   Musculoskeletal:      Right lower leg: No edema.      Left lower leg: No edema.   Skin:     General: Skin is warm and dry.      Comments: LLE wound, see LPS documentation for details   Neurological:      General: No focal deficit present.      Mental Status: He is alert and oriented to person, place, and time.      GCS: GCS eye subscore is 4. GCS verbal subscore is 5. GCS motor subscore is 6.   Psychiatric:         Attention and Perception: Attention and perception normal.         Mood and Affect: Mood and affect normal.         Speech: Speech normal.         Behavior: Behavior normal. Behavior is cooperative.         Thought Content: Thought content normal.         Cognition and Memory: Cognition normal. He exhibits impaired recent memory.         Judgment: Judgment normal.     Fluids    Intake/Output Summary (Last 24 hours) at 12/30/2020 1504  Last data filed at 12/30/2020 1400  Gross per 24 hour   Intake 480 ml   Output no documentation   Net 480 ml     Laboratory    Imaging  IR-US GUIDED PIV   Final Result    Ultrasound-guided PERIPHERAL IV INSERTION performed by    qualified nursing staff as above.      IR-MIDLINE CATHETER INSERTION WO GUIDANCE > AGE 3   Final Result                  Ultrasound-guided midline placement performed by qualified nursing staff    as above.          IR-US GUIDED PIV   Final Result    Ultrasound-guided PERIPHERAL IV INSERTION performed by    qualified nursing staff as above.      IR-MIDLINE CATHETER INSERTION WO GUIDANCE > AGE 3   Final Result                  Ultrasound-guided midline placement performed by qualified nursing staff    as above.          US-KOSTAS SINGLE LEVEL BILAT   Final Result      CT-HEAD W/O   Final Result      No acute intracranial abnormality      DX-TIBIA AND FIBULA LEFT   Final Result      1.  Healed distal metaphyseal fractures of the left fibula and tibia with tibial IM layla in place.      2.  No acute fracture or dislocation.      3.  No  radiopaque soft tissue foreign body.      DX-FEMUR-2+ LEFT   Final Result      1.  No radiographic evidence of acute traumatic injury left femur.      2.  Unchanged cortical thickening in the posterior aspect of the distal femoral diaphysis which may represent sequela of prior injury or infection.      3.  No soft tissue foreign body identified.      US-EXTREMITY VENOUS LOWER UNILAT LEFT   Final Result      DX-CHEST-PORTABLE (1 VIEW)   Final Result      No acute cardiopulmonary abnormality.         Assessment/Plan  * Infection of wound due to methicillin resistant Staphylococcus aureus (MRSA)- (present on admission)  Assessment & Plan  -History of MRSA.  -Poor compliance with OP wound care. Poor compliance with wound vac at times.   -Continue pain control as needed.  -LPS and wound care following - debridements and wound VAC changes per their recommendations  -Cultures repeated 12/14 - positive for Proteus species, pan sensitive.  Completed regimen of augmentin.     Encephalopathy- (present on admission)  Assessment & Plan  -Acute on chronic, likely due Wernicke's. Improved.   -Per psychiatry and Dr. Velázquez: Patient is incapacitated.  -Guardianship and placement pending.  Patient is contesting guardianship, hearing is now scheduled for 1/29/2021 at 10 AM.    Psychiatric disorder- (present on admission)  Assessment & Plan  -Unspecified.  -Continue Risperdal and Depakote.  -Psychiatry has consulted and deemed him incapacitated to leave AMA or make medical decisions.  -Pending guardianship, which he is contesting.    Essential hypertension- (present on admission)  Assessment & Plan  -Well controlled on amlodipine.    Flexion contracture of knee, 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)  Assessment & Plan  -Secondary to chronic wound in that area.  -PT/OT.  -Does not seem to limit his mobility.  Is frequently ambulatory.    Emphysema/COPD (HCC)- (present on admission)  Assessment & Plan  -Chronic and stable, without acute  exacerbation.    Alcohol dependence (HCC)- (present on admission)  Assessment & Plan  -History of. Abstinent since admission.    Protein malnutrition (HCC)- (present on admission)  Assessment & Plan  -Body mass index is 21.35 kg/m².   -Continue TID supplements.  -Encourage PO intake.    Tobacco dependence- (present on admission)  Assessment & Plan  -Abstinent since admission.     VTE prophylaxis: Jonathan Ohara, MSN, RN, APRN, ACNPC-AG, CCRN  Nurse Practitioner, [facility]  [phone]    12/30/2020    3:04 PM

## 2020-12-30 NOTE — PROGRESS NOTES
Called wound team to get updates on the dressing change. Wound team notified this RN that they are going to place the wound vac tomorrow (12/20/2020). Wound team instructed to change the dressing and put wet to dry dressing on the wound. Dressing changed.

## 2020-12-30 NOTE — HOSPITAL COURSE
Mr. Varela is a 65-year-old male with a PMHx of alcohol dependence, tobacco dependence, psychiatric disorder, and HTN who presented to the emergency department on 8/7/2020 with a left lower extremity wound infection. He was hospitalized at our facility last April and was evaluated by orthopedic surgery who recommended ongoing wound care. Wound cultures at that time grew MSSA and Streptococcus. He had been seen at [facility] earlier that week and prescribed antibiotics, but never filled the prescription.  An ultrasound of that extremity was done and was negative for DVT.  He was treated for sepsis and completed an antibiotic course on 9/16/2020. He was also found to have head lice and underwent treatment for that.  A repeat wound culture was done on 9/28/2020 and grew Strep A and Proteus. Infectious disease was consulted and recommended a 5-day course of IV zosyn which was completed on 10/5/2020. On 10/12/2020 the wound care team noticed increased inflammation to the proximal part of the wound bed and switched from a vera flow wound VAC to a regular wound VAC.  ID was again consulted and on 10/14/2020 recommended a 7-day course of antibiotics with meropenem which was completed on 10/22/2020. Additionally, he was noted to have intermittent agitation so was started on risperdal, depakote, and gabapentin per psychiatry recommendations.  On 11/20/2020 he underwent left lower extremity debridement with wound VAC placement. Since then he has remained stable but has been deemed incapacitated to make medical decisions so guardianship was pursued and granted on 1/29/21. Placement will likely be to a group home with home health services.

## 2020-12-31 PROCEDURE — 700111 HCHG RX REV CODE 636 W/ 250 OVERRIDE (IP): Performed by: NURSE PRACTITIONER

## 2020-12-31 PROCEDURE — 700102 HCHG RX REV CODE 250 W/ 637 OVERRIDE(OP): Performed by: NURSE PRACTITIONER

## 2020-12-31 PROCEDURE — A9270 NON-COVERED ITEM OR SERVICE: HCPCS | Performed by: NURSE PRACTITIONER

## 2020-12-31 PROCEDURE — 700101 HCHG RX REV CODE 250: Performed by: NURSE PRACTITIONER

## 2020-12-31 PROCEDURE — 770001 HCHG ROOM/CARE - MED/SURG/GYN PRIV*

## 2020-12-31 RX ADMIN — DIVALPROEX SODIUM 125 MG: 125 CAPSULE ORAL at 12:00

## 2020-12-31 RX ADMIN — GABAPENTIN 200 MG: 100 CAPSULE ORAL at 11:45

## 2020-12-31 RX ADMIN — RISPERIDONE 0.5 MG: 0.5 TABLET ORAL at 04:40

## 2020-12-31 RX ADMIN — LIDOCAINE 1 PATCH: 50 PATCH CUTANEOUS at 11:46

## 2020-12-31 RX ADMIN — ENOXAPARIN SODIUM 40 MG: 40 INJECTION SUBCUTANEOUS at 04:40

## 2020-12-31 RX ADMIN — DIVALPROEX SODIUM 125 MG: 125 CAPSULE ORAL at 22:42

## 2020-12-31 RX ADMIN — HYDROXYZINE HYDROCHLORIDE 25 MG: 50 TABLET, FILM COATED ORAL at 11:45

## 2020-12-31 RX ADMIN — HYDROXYZINE HYDROCHLORIDE 25 MG: 50 TABLET, FILM COATED ORAL at 22:42

## 2020-12-31 RX ADMIN — OXYCODONE HYDROCHLORIDE 10 MG: 10 TABLET ORAL at 04:40

## 2020-12-31 RX ADMIN — OXYCODONE HYDROCHLORIDE 10 MG: 10 TABLET ORAL at 22:42

## 2020-12-31 RX ADMIN — GABAPENTIN 200 MG: 100 CAPSULE ORAL at 17:32

## 2020-12-31 RX ADMIN — DIVALPROEX SODIUM 125 MG: 125 CAPSULE ORAL at 04:40

## 2020-12-31 RX ADMIN — AMLODIPINE BESYLATE 5 MG: 5 TABLET ORAL at 04:40

## 2020-12-31 RX ADMIN — OXYCODONE HYDROCHLORIDE 10 MG: 10 TABLET ORAL at 11:45

## 2020-12-31 RX ADMIN — RISPERIDONE 1 MG: 1 TABLET ORAL at 17:32

## 2020-12-31 RX ADMIN — CYCLOBENZAPRINE 10 MG: 10 TABLET, FILM COATED ORAL at 17:32

## 2020-12-31 RX ADMIN — GABAPENTIN 200 MG: 100 CAPSULE ORAL at 04:39

## 2020-12-31 ASSESSMENT — PAIN DESCRIPTION - PAIN TYPE
TYPE: ACUTE PAIN;SURGICAL PAIN
TYPE: ACUTE PAIN;SURGICAL PAIN

## 2020-12-31 NOTE — CARE PLAN
Problem: Safety  Goal: Will remain free from falls  Description: Pt mobilizes frequently independently.   Outcome: PROGRESSING AS EXPECTED     Problem: Bowel/Gastric:  Goal: Normal bowel function is maintained or improved  Note:  12/31

## 2020-12-31 NOTE — WOUND TEAM
[facility]  Inpatient Services  Wound and Skin Care Progress Note    Admission Date: 8/7/2020     Last order of IP CONSULT TO WOUND CARE was found on 9/1/2020 from Hospital Encounter on 8/7/2020     HPI, PMH, SH: Reviewed    Unit where seen by Wound Team: [id number]    WOUND CONSULT/FOLLOW UP RELATED TO:  Left leg follow-up    Self Report / Pain Level: pt slept through most of wound care    OBJECTIVE:  Gauze on wounds    WOUND TYPE, LOCATION, CHARACTERISTICS (Pressure Injuries: location, stage, POA or date identified)              Negative Pressure Wound Therapy 11/20/20 Leg Lateral;Posterior;Medial Left (Active)   NPWT Pump Mode / Pressure Setting Continuous 12/30/20 2000   Dressing Type Medium;Black Foam (Regular) 12/30/20 1500   Number of Foam Pieces Used 3 12/27/20 1200   Canister Changed No 12/30/20 2000   Output (mL) 50 mL 12/06/20 0603   NEXT Dressing Change/Treatment Date 12/29/20 12/27/20 2000   WOUND NURSE ONLY - Time Spent with Patient (mins) 100 12/14/20 1400           Wound 11/19/20 Full Thickness Wound Leg Lateral;Posterior;Medial Left (Active)   Wound Image    12/30/20 1547   Site Assessment Red yellow 12/30/20 2000   Periwound Assessment scar 12/30/20 2000   Margins Attached and defined 12/30/20 2000   Closure Secondary intention 12/30/20 2000   Drainage Amount Large  12/30/20 2000   Drainage Description Serosanguineous 12/30/20 1500   Treatments Site care;Cleansed;CSWD - Conservative Sharp Wound Debridement 12/30/20 1500   Wound Cleansing Approved Wound Cleanser 12/30/20 1500   Periwound Protectant Skin Protectant Wipes to Periwound;Drape;Antifungal Therapy 12/30/20 1500   Dressing Cleansing/Solutions Not Applicable 12/30/20 1500   Dressing Options Wound Vac 12/30/20 2000   Dressing Changed Changed 12/30/20 1500   Dressing Status Clean;Dry;Intact 12/30/20 2000   Dressing Change/Treatment Frequency By Wound Team Only 12/30/20 2000   NEXT Dressing Change/Treatment Date 01/02/21 12/30/20 1500    NEXT Weekly Photo (Inpatient Only) 21 1500   Non-staged Wound Description Full thickness 20 1500   Wound Bed Granulation (%) 70 % 20 1500   Wound Bed Slough (%) 30 % 20 1500   Wound Bed Granulation (%) - Post-Procedure 100 % 20 1300   Shape irregular 20 1500   Wound Odor Mild 20 1500   Pulses Left;2+;DP;PT 20 0945   Exposed Structures None 20 1500   WOUND NURSE ONLY - Time Spent with Patient (mins) 75 20 1500         Wounds are no longer connected - measured as 2 wounds - posterior thigh and medial leg.        VASCULAR    CESAR:   CESAR Results, Last 30 Days Us-cesar Single Level Bilat    Result Date: 2020  Narrative  Vascular Laboratory  Conclusions  1.  Normal bilateral CESAR'sGRUPO FUENTES  Age:    64    Gender:     M  MRN:    [id number]  :    1955      BSA:  Exam Date:     2020 09:59  Room #:     Inpatient  Priority:     Routine  Ht (in):             Wt (lb):  Ordering Physician:        EDDA CANADA  Referring Physician:       [id number]  Sonographer:               Deja Ellis RDMS                             T  Study Type:                Complete Bilateral  Technical Quality:         Adequate  Indications:     Ulceration of LE  CPT Codes:       76688  ICD Codes:       707.1  History:         Nonhealing wound of left lower extremity.  Limitations:                 RIGHT  Waveform            Systolic BPs (mmHg)                             117           Brachial  Triphasic                                Common Femoral  Triphasic                  138           Posterior Tibial  Triphasic                  132           Dorsalis Pedis                                           Peroneal                             1.18          CESAR                                           TBI                       LEFT  Waveform        Systolic BPs (mmHg)                              114           Brachial  Triphasic                                Common Femoral  Triphasic                  127           Posterior Tibial  Triphasic                  122           Dorsalis Pedis                                           Peroneal                             1.09          KOSTAS                                           TBI  Findings  Right.  Doppler waveforms of the common femoral artery are of high amplitude and  triphasic.  Doppler waveforms at the ankle are brisk and triphasic.  Ankle-brachial index is normal.  Left.  Doppler waveforms of the common femoral artery are of high amplitude and  triphasic.  Doppler waveforms at the ankle are brisk and triphasic.  Ankle-brachial index is normal.  Unable to obtained popliteal waveforms due to wound bandages.  Additional testing was not performed in accordance with lower extremity  arterial evaluation protocol.  Clover Maya  (Electronically Signed)  Final Date:      02 September 2020                   11:14    Lab Values:    Lab Results   Component Value Date/Time    WBC 6.5 11/22/2020 02:44 AM    RBC 3.54 (L) 11/22/2020 02:44 AM    HEMOGLOBIN 9.9 (L) 11/22/2020 02:44 AM    HEMATOCRIT 30.5 (L) 11/22/2020 02:44 AM    CREACTPROT 1.07 (H) 08/12/2020 01:55 PM    SEDRATEWES 85 (H) 08/07/2020 01:20 PM    HBA1C 5.7 (H) 11/09/2018 06:30 PM        Culture Results show:  Recent Results (from the past 720 hour(s))   CULTURE WOUND W/ GRAM STAIN    Collection Time: 09/01/20  2:00 PM    Specimen: Left Leg; Wound   Result Value Ref Range    Significant Indicator POS (POS)     Source WND     Site LEFT LEG     Culture Result - (A)     Gram Stain Result       Moderate Gram positive cocci.  Moderate Gram positive rods.      Culture Result (A)      Beta Hemolytic Streptococcus group A  Moderate growth      Culture Result (A)      Methicillin Resistant Staphylococcus aureus  Moderate growth      Culture Result (A)      Diphtheroids  Moderate growth  Mixed  morphologies.         INTERVENTIONS BY WOUND TEAM:    Dressings removed cleansed wound bed with wound cleanser.  Meredith-wound cleansed with wound cleanser .    Sharp debrided with scalpel 40 cm2 yellow slough and wound cleansed again.  Meredith-wound skin protected with benzoin and dermatac drape.  .  Nystatin to wound bed, black foam to all of wound bed (out of silver - will you silver powder in the future), secured with regular drape.  TRAC pad placed.  NPWT resumed, no leaks noted.  Trial of ACE around leg.  PWT to 150mmHg due to drainage.      Interdisciplinary consultation: Patient, Bedside RN    EVALUATION / RATIONALE FOR TREATMENT:    12/31/2020 thigh wound much narrower.  Biofilm redeveloping and odor still present.   12/27/20: Wound bed granular and odor has lessen but still persists.   12/18/20 wound length decreased.  Odor persists.    12/14/2020: re-cultured wound bed.  12/11/2020 POD 23 Length of wounds has decreased, width has not changed.  Odor persists.  Pt continues to become angry with VAC when it limits his mobility.    12/7/2020 POD 19 odor persists, improved wound bed after debridement.  Possible bacterial vs yeast vs fungal infection.  Culture taken.  Nystatin to treat possible fungal infection, silver foam to decrease microbial population.    12/4/2020 POD# 16 odor worsening, more yellow tissue present, will add nystatin and silver foam next week and consider a culture  11/30 POD#11 granular buds visible to wound bed.  Same as prior.   11/27 POD#8 wound is odorous likely related to biologic dressing.  No overt signs of infection.  Granular buds are visible through adaptic.  Edges do not appear as thick as they were prior to last sx.  Continue with current treatment.    11/23: POD#4 s/p I&D of left LE wounds and biologic placed by Dr. Olvera on 11/19.  Wound bed is flatter and raised edges scar tissue seems to be gone.   11/14 Posterior thigh aspect of wound deteriorating, will increase frequency of  treatment to keep biofilm down and hopefully avoid systemic abx.  Will include thigh in compression wrap.  Will consider culture if improvement is not seen in the next few treatments.    11/10: APRN unavailable at this time.  Will re-schedule excisional debridement to next week.   11/5: Plan for debridement of scarred edges on Tues by LPS. Tendon exposed to posterior aspect of wound, behind knee. Continue with current dressing care.  10/29: Excisional debridement by Asaf ELLER of scar tissue along the proximal edge of the posterior knee and lateral thigh.  Lateral aspect of thigh is flatten.  Medial aspect of knee has thick scar tissue that was excisionally debrided by Asaf ELLER.  Fully granulated throughout the wound bed.   10/23 wound bed mostly granular, superficial in depth, progress has been slow with the wound VAC so will trial collagen product to encourage new tissue growth.    10/19: wound bed has improved in color and is fully granulated.  Addison-wound has improved, denudation has resolved. APRN continuing with serial weekly debridements as needed.   10/16: wound friable pt now on iv abx per ID.  Indurated edges addressed with drape and foam.  Addison wound likely has yeast infection - addressing with silver powder crusting  10/14: patient seen with Asaf ELLER, stopping veraflo instillation.  Starting silver powder and regular wound VAC to see if it will help with bioburden.  Possible colonization of bacteria.  ID involved.   10/12: Inflammation noted to the proximal part of the wound bed.  Will continue to monitor, possible vac break on Wednesday to help addison-skin inflammation.   10/7: Excisional debridement performed by Asaf ELLER. Will need to continue selective debridement with NPWT changes, and weekly excisional debridement with APRN to flatten out the scar tissue.  IV abx therapy ended 10/5.   10/5: Lateral wound still with some slough, both wounds smaller per measurements.  Medial wound with all granular tissue.  9/30: Patient to start abx therapy for 7 days course per Dr. Ellis.  Started dakin's instillation to veraflo  9/28: malodorous today, culture taken.    9/25: Odor noted, though unclear if from wound or pt, consider shower before next Vac change. Consider regular vac next change, wound granular and superficial.   9/23: Patient still awaiting guardianship for placement.   9/18: wound granulating nicely and responding well to current treatment.    9/16: Wound bed with granular tissue, edges are thick but appear better than previous assessments. . NPWT VF to encourage closure by secondary intention and manage drainage while encouraging oxygenation and granulation to the wound bed. 2 layer compression to assist in decrease of swelling and encourage blood flow to wounds. Wound team to follow up and change 3x/week.      Goals: Steady decrease in wound area and depth weekly.    NURSING PLAN OF CARE ORDERS (X):    Dressing changes: See Dressing Care orders: X  Skin care: See Skin Care orders:   Rectal tube care: See Rectal Tube Care orders:   Other orders:      WOUND TEAM PLAN OF CARE:   Dressing changes by wound team:        Follow up 3 times weekly:                NPWT change 3 times weekly:  X,  NPWT changes 3x/ weekly with 2 layer compression wrap.   Follow up 1-2 times weekly:      Follow up Bi-Monthly:                   Follow up as needed:       Other (explain):     Anticipated discharge plans:  LTACH:        SNF/Rehab: X - patient will need ongoing wound care for LLE upon discharge - 3x/weekly           Home Health Care:           Outpatient Wound Center:            Self Care:

## 2021-01-01 PROCEDURE — 97598 DBRDMT OPN WND ADDL 20CM/<: CPT

## 2021-01-01 PROCEDURE — A9270 NON-COVERED ITEM OR SERVICE: HCPCS | Performed by: NURSE PRACTITIONER

## 2021-01-01 PROCEDURE — 700102 HCHG RX REV CODE 250 W/ 637 OVERRIDE(OP): Performed by: NURSE PRACTITIONER

## 2021-01-01 PROCEDURE — 97606 NEG PRS WND THER DME>50 SQCM: CPT | Mod: XU

## 2021-01-01 PROCEDURE — 770001 HCHG ROOM/CARE - MED/SURG/GYN PRIV*

## 2021-01-01 PROCEDURE — 97597 DBRDMT OPN WND 1ST 20 CM/<: CPT

## 2021-01-01 PROCEDURE — 700101 HCHG RX REV CODE 250: Performed by: NURSE PRACTITIONER

## 2021-01-01 PROCEDURE — 700111 HCHG RX REV CODE 636 W/ 250 OVERRIDE (IP): Performed by: NURSE PRACTITIONER

## 2021-01-01 RX ADMIN — AMLODIPINE BESYLATE 5 MG: 5 TABLET ORAL at 06:44

## 2021-01-01 RX ADMIN — OXYCODONE HYDROCHLORIDE 10 MG: 10 TABLET ORAL at 20:59

## 2021-01-01 RX ADMIN — GABAPENTIN 200 MG: 100 CAPSULE ORAL at 10:17

## 2021-01-01 RX ADMIN — GABAPENTIN 200 MG: 100 CAPSULE ORAL at 16:18

## 2021-01-01 RX ADMIN — OXYCODONE HYDROCHLORIDE 10 MG: 10 TABLET ORAL at 06:44

## 2021-01-01 RX ADMIN — DIVALPROEX SODIUM 125 MG: 125 CAPSULE ORAL at 13:24

## 2021-01-01 RX ADMIN — OXYCODONE HYDROCHLORIDE 10 MG: 10 TABLET ORAL at 13:24

## 2021-01-01 RX ADMIN — DIVALPROEX SODIUM 125 MG: 125 CAPSULE ORAL at 06:44

## 2021-01-01 RX ADMIN — RISPERIDONE 0.5 MG: 0.5 TABLET ORAL at 06:44

## 2021-01-01 RX ADMIN — GABAPENTIN 200 MG: 100 CAPSULE ORAL at 06:44

## 2021-01-01 RX ADMIN — RISPERIDONE 1 MG: 1 TABLET ORAL at 16:18

## 2021-01-01 RX ADMIN — DIVALPROEX SODIUM 125 MG: 125 CAPSULE ORAL at 20:59

## 2021-01-01 RX ADMIN — LIDOCAINE 1 PATCH: 50 PATCH CUTANEOUS at 10:17

## 2021-01-01 RX ADMIN — ENOXAPARIN SODIUM 40 MG: 40 INJECTION SUBCUTANEOUS at 06:43

## 2021-01-01 ASSESSMENT — PAIN DESCRIPTION - PAIN TYPE
TYPE: ACUTE PAIN;SURGICAL PAIN
TYPE: ACUTE PAIN
TYPE: ACUTE PAIN;SURGICAL PAIN
TYPE: ACUTE PAIN;SURGICAL PAIN

## 2021-01-01 NOTE — WOUND TEAM
[facility]  Inpatient Services  Wound and Skin Care Progress Note    Admission Date: 8/7/2020     Last order of IP CONSULT TO WOUND CARE was found on 9/1/2020 from Hospital Encounter on 8/7/2020     HPI, PMH, SH: Reviewed    Unit where seen by Wound Team: [id number]    WOUND CONSULT/FOLLOW UP RELATED TO:  Left leg follow-up; scheduled npwt dressing change.    Self Report / Pain Level: Pt reports mild pain with debridement; otherwise denies.    OBJECTIVE:  Gauze on wounds    WOUND TYPE, LOCATION, CHARACTERISTICS (Pressure Injuries: location, stage, POA or date identified)    Negative Pressure Wound Therapy 11/20/20 Leg Lateral;Posterior;Medial Left (Active)   NPWT Pump Mode / Pressure Setting [place];Continuous;125 mmHg 01/01/21 1100   Dressing Type Medium;Black Foam (Regular) 01/01/21 1100   Number of Foam Pieces Used 3 01/01/21 1100   Canister Changed Yes 01/01/21 1100   Output (mL) 800 mL 01/01/21 1100   NEXT Dressing Change/Treatment Date 01/04/21 01/01/21 1100   WOUND NURSE ONLY - Time Spent with Patient (mins) 60 01/01/21 1100           Wound 11/19/20 Full Thickness Wound Leg Lateral;Posterior;Medial Left (Active)   Wound Image    12/30/20 1547   Site Assessment Pink;Red;Yellow 01/01/21 1100   Periwound Assessment Scar tissue;[place] 01/01/21 1100   Margins Defined edges;Attached edges 01/01/21 1100   Closure Secondary intention 01/01/21 1100   Drainage Amount Large 01/01/21 1100   Drainage Description Serosanguineous;Brown 01/01/21 1100   Treatments Cleansed;CSWD - Conservative Sharp Wound Debridement 01/01/21 1100   Wound Cleansing Approved Wound Cleanser 01/01/21 1100   Periwound Protectant Skin Protectant Wipes to Periwound;Drape;Antifungal Therapy 01/01/21 1100   Dressing Cleansing/Solutions Not Applicable 01/01/21 1100   Dressing Options Wound Vac;Ace Wrap;Other (Comments) 01/01/21 1100   Dressing Changed Changed 01/01/21 1100   Dressing Status Clean;Dry;Intact 01/01/21 1100   Dressing  Change/Treatment Frequency By Wound Team Only 21 1100   NEXT Dressing Change/Treatment Date 21 1100   NEXT Weekly Photo (Inpatient Only) 21 1500   Non-staged Wound Description Full thickness 21 1100   Wound Bed Granulation (%) 70 % 20 1500   Wound Bed Slough (%) 30 % 20 1500   Wound Bed Granulation (%) - Post-Procedure 100 % 20 1300   Shape irregular 20 1500   Wound Odor Mild;Other (Comments) 21 1100   Pulses Left;2+;DP;PT 20 0945   Exposed Structures None 21 1100   WOUND NURSE ONLY - Time Spent with Patient (mins) 60 21 1100        Wounds are no longer connected - measured as 2 wounds - posterior thigh and medial leg.        VASCULAR    CESAR:   CESAR Results, Last 30 Days Us-cesar Single Level Bilat    Result Date: 2020  Narrative  Vascular Laboratory  Conclusions  1.  Normal bilateral CESAR's.  GRUPO GANN  Age:    64    Gender:     M  MRN:    [id number]  :    1955      BSA:  Exam Date:     2020 09:59  Room #:     Inpatient  Priority:     Routine  Ht (in):             Wt (lb):  Ordering Physician:        EDDA CANADA  Referring Physician:       [id number] Morrissey  Sonographer:               Deja Ellis RDMS                             RVT  Study Type:                Complete Bilateral  Technical Quality:         Adequate  Indications:     Ulceration of LE  CPT Codes:       97486  ICD Codes:       707.1  History:         Nonhealing wound of left lower extremity.  Limitations:                 RIGHT  Waveform            Systolic BPs (mmHg)                             117           Brachial  Triphasic                                Common Femoral  Triphasic                  138           Posterior Tibial  Triphasic                  132           Dorsalis Pedis                                           Peroneal                             1.18           KOSTAS                                           TBI                       LEFT  Waveform        Systolic BPs (mmHg)                             114           Brachial  Triphasic                                Common Femoral  Triphasic                  127           Posterior Tibial  Triphasic                  122           Dorsalis Pedis                                           Peroneal                             1.09          KOSTAS                                           TBI  Findings  Right.  Doppler waveforms of the common femoral artery are of high amplitude and  triphasic.  Doppler waveforms at the ankle are brisk and triphasic.  Ankle-brachial index is normal.  Left.  Doppler waveforms of the common femoral artery are of high amplitude and  triphasic.  Doppler waveforms at the ankle are brisk and triphasic.  Ankle-brachial index is normal.  Unable to obtained popliteal waveforms due to wound bandages.  Additional testing was not performed in accordance with lower extremity  arterial evaluation protocol.  Clover Maya  (Electronically Signed)  Final Date:      02 September 2020                   11:14    Lab Values:    Lab Results   Component Value Date/Time    WBC 6.5 11/22/2020 02:44 AM    RBC 3.54 (L) 11/22/2020 02:44 AM    HEMOGLOBIN 9.9 (L) 11/22/2020 02:44 AM    HEMATOCRIT 30.5 (L) 11/22/2020 02:44 AM    CREACTPROT 1.07 (H) 08/12/2020 01:55 PM    SEDRATEWES 85 (H) 08/07/2020 01:20 PM    HBA1C 5.7 (H) 11/09/2018 06:30 PM        Culture Results show:  Recent Results (from the past 720 hour(s))   CULTURE WOUND W/ GRAM STAIN    Collection Time: 09/01/20  2:00 PM    Specimen: Left Leg; Wound   Result Value Ref Range    Significant Indicator POS (POS)     Source WND     Site LEFT LEG     Culture Result - (A)     Gram Stain Result       Moderate Gram positive cocci.  Moderate Gram positive rods.      Culture Result (A)      Beta Hemolytic Streptococcus group A  Moderate growth      Culture Result (A)       Methicillin Resistant Staphylococcus aureus  Moderate growth      Culture Result (A)      Diphtheroids  Moderate growth  Mixed morphologies.         INTERVENTIONS BY WOUND TEAM:    Dressings removed cleansed wound bed with wound cleanser.  Meredith-wound cleansed with wound cleanser .    Sharp debrided with curette 40 cm2 yellow slough and wound cleansed again.  Meredith-wound skin protected with benzoin and dermatac drape.  .  Nystatin and silver powder to wound bed, black foam to all of wound bed, secured with regular drape.  TRAC pad placed.  NPWT resumed, no leaks noted.  Trial of ACE around leg last dressing change went well and remained in place; continued today (rather than 2 layer wrap as previously).  PWT to 150mmHg due to drainage.      Interdisciplinary consultation: Patient, Bedside RN    EVALUATION / RATIONALE FOR TREATMENT:    12/31/2020 thigh wound much narrower.  Biofilm redeveloping and odor still present.   12/27/20: Wound bed granular and odor has lessen but still persists.   12/18/20 wound length decreased.  Odor persists.    12/14/2020: re-cultured wound bed.  12/11/2020 POD 23 Length of wounds has decreased, width has not changed.  Odor persists.  Pt continues to become angry with VAC when it limits his mobility.    12/7/2020 POD 19 odor persists, improved wound bed after debridement.  Possible bacterial vs yeast vs fungal infection.  Culture taken.  Nystatin to treat possible fungal infection, silver foam to decrease microbial population.    12/4/2020 POD# 16 odor worsening, more yellow tissue present, will add nystatin and silver foam next week and consider a culture  11/30 POD#11 granular buds visible to wound bed.  Same as prior.   11/27 POD#8 wound is odorous likely related to biologic dressing.  No overt signs of infection.  Granular buds are visible through adaptic.  Edges do not appear as thick as they were prior to last sx.  Continue with current treatment.    11/23: POD#4 s/p I&D of left  LE wounds and biologic placed by Dr. Olvera on 11/19.  Wound bed is flatter and raised edges scar tissue seems to be gone.   11/14 Posterior thigh aspect of wound deteriorating, will increase frequency of treatment to keep biofilm down and hopefully avoid systemic abx.  Will include thigh in compression wrap.  Will consider culture if improvement is not seen in the next few treatments.    11/10: APRN unavailable at this time.  Will re-schedule excisional debridement to next week.   11/5: Plan for debridement of scarred edges on Tues by LPS. Tendon exposed to posterior aspect of wound, behind knee. Continue with current dressing care.  10/29: Excisional debridement by Asaf ELLER of scar tissue along the proximal edge of the posterior knee and lateral thigh.  Lateral aspect of thigh is flatten.  Medial aspect of knee has thick scar tissue that was excisionally debrided by Asaf ELLER.  Fully granulated throughout the wound bed.   10/23 wound bed mostly granular, superficial in depth, progress has been slow with the wound VAC so will trial collagen product to encourage new tissue growth.    10/19: wound bed has improved in color and is fully granulated.  Addison-wound has improved, denudation has resolved. APRN continuing with serial weekly debridements as needed.   10/16: wound friable pt now on iv abx per ID.  Indurated edges addressed with drape and foam.  Addison wound likely has yeast infection - addressing with silver powder crusting  10/14: patient seen with Asaf ELLER, stopping veraflo instillation.  Starting silver powder and regular wound VAC to see if it will help with bioburden.  Possible colonization of bacteria.  ID involved.   10/12: Inflammation noted to the proximal part of the wound bed.  Will continue to monitor, possible vac break on Wednesday to help addison-skin inflammation.   10/7: Excisional debridement performed by Asaf ELLER. Will need to continue selective debridement  with NPWT changes, and weekly excisional debridement with APRN to flatten out the scar tissue.  IV abx therapy ended 10/5.   10/5: Lateral wound still with some slough, both wounds smaller per measurements. Medial wound with all granular tissue.  9/30: Patient to start abx therapy for 7 days course per Dr. Ellis.  Started dakin's instillation to veraflo  9/28: malodorous today, culture taken.    9/25: Odor noted, though unclear if from wound or pt, consider shower before next Vac change. Consider regular vac next change, wound granular and superficial.   9/23: Patient still awaiting guardianship for placement.   9/18: wound granulating nicely and responding well to current treatment.    9/16: Wound bed with granular tissue, edges are thick but appear better than previous assessments. . NPWT VF to encourage closure by secondary intention and manage drainage while encouraging oxygenation and granulation to the wound bed. 2 layer compression to assist in decrease of swelling and encourage blood flow to wounds. Wound team to follow up and change 3x/week.      Goals: Steady decrease in wound area and depth weekly.    NURSING PLAN OF CARE ORDERS (X):    Dressing changes: See Dressing Care orders: X  Skin care: See Skin Care orders:   Rectal tube care: See Rectal Tube Care orders:   Other orders:      WOUND TEAM PLAN OF CARE:   Dressing changes by wound team:        Follow up 3 times weekly:                NPWT change 3 times weekly:  X,  NPWT changes 3x/ weekly with ace wrap.  Follow up 1-2 times weekly:      Follow up Bi-Monthly:                   Follow up as needed:       Other (explain):     Anticipated discharge plans:  LTACH:        SNF/Rehab: X - patient will need ongoing wound care for LLE upon discharge - 3x/weekly           Home Health Care:           Outpatient Wound Center:            Self Care:

## 2021-01-01 NOTE — CARE PLAN
Problem: Discharge Barriers/Planning  Goal: Patient's continuum of care needs will be met  Outcome: PROGRESSING SLOWER THAN EXPECTED     PENDING GUARDIANSHIP  court date is scheduled for 1/29/2021 at 10 a.m.

## 2021-01-02 PROCEDURE — A9270 NON-COVERED ITEM OR SERVICE: HCPCS | Performed by: NURSE PRACTITIONER

## 2021-01-02 PROCEDURE — 700102 HCHG RX REV CODE 250 W/ 637 OVERRIDE(OP): Performed by: NURSE PRACTITIONER

## 2021-01-02 PROCEDURE — 700101 HCHG RX REV CODE 250: Performed by: NURSE PRACTITIONER

## 2021-01-02 PROCEDURE — 700111 HCHG RX REV CODE 636 W/ 250 OVERRIDE (IP): Performed by: NURSE PRACTITIONER

## 2021-01-02 PROCEDURE — 770001 HCHG ROOM/CARE - MED/SURG/GYN PRIV*

## 2021-01-02 PROCEDURE — 99231 SBSQ HOSP IP/OBS SF/LOW 25: CPT | Performed by: NURSE PRACTITIONER

## 2021-01-02 RX ADMIN — DIVALPROEX SODIUM 125 MG: 125 CAPSULE ORAL at 06:29

## 2021-01-02 RX ADMIN — GABAPENTIN 200 MG: 100 CAPSULE ORAL at 17:26

## 2021-01-02 RX ADMIN — OXYCODONE HYDROCHLORIDE 10 MG: 10 TABLET ORAL at 06:29

## 2021-01-02 RX ADMIN — LIDOCAINE 1 PATCH: 50 PATCH CUTANEOUS at 12:23

## 2021-01-02 RX ADMIN — DIVALPROEX SODIUM 125 MG: 125 CAPSULE ORAL at 13:34

## 2021-01-02 RX ADMIN — RISPERIDONE 1 MG: 1 TABLET ORAL at 17:26

## 2021-01-02 RX ADMIN — OXYCODONE HYDROCHLORIDE 10 MG: 10 TABLET ORAL at 16:23

## 2021-01-02 RX ADMIN — ENOXAPARIN SODIUM 40 MG: 40 INJECTION SUBCUTANEOUS at 06:29

## 2021-01-02 RX ADMIN — GABAPENTIN 200 MG: 100 CAPSULE ORAL at 06:29

## 2021-01-02 RX ADMIN — GABAPENTIN 200 MG: 100 CAPSULE ORAL at 12:23

## 2021-01-02 RX ADMIN — AMLODIPINE BESYLATE 5 MG: 5 TABLET ORAL at 06:29

## 2021-01-02 RX ADMIN — RISPERIDONE 0.5 MG: 0.5 TABLET ORAL at 06:29

## 2021-01-02 ASSESSMENT — ENCOUNTER SYMPTOMS
SHORTNESS OF BREATH: 0
DIARRHEA: 0
SPEECH CHANGE: 0
DEPRESSION: 0
VOMITING: 0
DIZZINESS: 0
INSOMNIA: 0
CONSTIPATION: 0
FEVER: 0
HEADACHES: 0
FOCAL WEAKNESS: 0
ABDOMINAL PAIN: 0
COUGH: 0
SENSORY CHANGE: 0
WEAKNESS: 0
NAUSEA: 0
NERVOUS/ANXIOUS: 0

## 2021-01-02 NOTE — ASSESSMENT & PLAN NOTE
-Wound vac to LLE. Wound care following, appreciate assistance.   -Pain controlled, continue med regimen as currently ordered.   -Monitor for evidence of infection.   -Home health and home Wound VAC to be placed   -Possible discharge on Monday.   to manage wound vac as outpatient.

## 2021-01-02 NOTE — PROGRESS NOTES
Hospital Medicine Twice Weekly Progress Note    Date of Service  1/2/2021    Chief Complaint  LLE wound    Hospital Course  Mr. Varela is a 65-year-old male who presented to the emergency department with a left lower extremity wound infection. He was hospitalized at our facility in April and evaluated by orthopedic surgery who recommended wound care. Wound cultures at that time grew MSSA and Streptococcus. He had been seen at [facility] earlier that week and prescribed antibiotics, however he had not filled the prescription.  An ultrasound of that extremity was done and was negative for DVT.  He was treated for sepsis and completed an antibiotic course on 9/16/2020. He was also found to have head lice and underwent treatment for that.  A repeat wound culture was done on 9/28/2020 and grew Strep A and Proteus. Infectious disease was consulted and recommended a 5-day course of IV zosyn which was completed on 10/5/2020. On 10/12/2020 the wound care team noticed increased inflammation to the proximal part of the wound bed and switched from a vera flow wound VAC to a regular wound VAC.  ID was again consulted and on 10/14/2020 recommended to 7-day course of antibiotics with meropenem which was completed on 10/22/2020. Additionally, he was noted to have intermittent agitation so was started on risperdal, depakote, and gabapentin per psychiatric recommendations.  On 11/20/2020 he underwent left lower extremity debridement with wound VAC placement. Since then he has remained stable.  He has been deemed incapacitated to make medical decisions and is currently pending guardianship (which he is contesting) and placement, likely to a group home. His court date is now scheduled for 1/29/2021 at 10 a.m.      Interval Problem Update  Wound vac back in place. Pt sleeping but easily aroused. No pain or other complaints.     Afebrile, HR 70s-90s, SBP teens-120s, O2 sats WNL on room air.      Consultants/Specialty  LPS  ID  Psychiatry    Code Status  Full Code    Disposition  Contesting guardianship, hearing is 1/29/2021.     Review of Systems  Review of Systems   Constitutional: Negative for fever and malaise/fatigue.   Respiratory: Negative for cough and shortness of breath.    Cardiovascular: Negative for chest pain and leg swelling.   Gastrointestinal: Negative for abdominal pain, constipation, diarrhea, nausea and vomiting.   Genitourinary: Negative for dysuria.   Neurological: Negative for dizziness, sensory change, speech change, focal weakness, weakness and headaches.   Psychiatric/Behavioral: Negative for depression. The patient is not nervous/anxious and does not have insomnia.    All other systems reviewed and are negative.     Physical Exam  Temp:  [36.6 °C (97.8 °F)-36.8 °C (98.2 °F)] 36.6 °C (97.8 °F)  Pulse:  [74-97] 74  Resp:  [16] 16  BP: (112-126)/(66-90) 116/66  SpO2:  [96 %-97 %] 96 %    Physical Exam  Vitals signs and nursing note reviewed.   Constitutional:       General: He is sleeping. He is not in acute distress.     Appearance: Normal appearance. He is well-developed. He is not ill-appearing.   HENT:      Head: Normocephalic and atraumatic.      Mouth/Throat:      Lips: Pink.      Mouth: Mucous membranes are moist.   Eyes:      Pupils: Pupils are equal, round, and reactive to light.   Neck:      Musculoskeletal: Normal range of motion and neck supple.   Cardiovascular:      Rate and Rhythm: Normal rate and regular rhythm.      Pulses: Normal pulses.      Heart sounds: Normal heart sounds.   Pulmonary:      Effort: Pulmonary effort is normal.      Breath sounds: Normal breath sounds.   Abdominal:      General: Abdomen is flat. Bowel sounds are normal. There is no abdominal bruit.      Palpations: Abdomen is soft.      Tenderness: There is no abdominal tenderness.   Musculoskeletal:      Right lower leg: No edema.      Left lower leg: No edema.   Skin:     General: Skin is  warm and dry.      Comments: LLE wound, see LPS documentation for details.  Wound vac now in place.   Neurological:      General: No focal deficit present.      Mental Status: He is oriented to person, place, and time and easily aroused.      GCS: GCS eye subscore is 4. GCS verbal subscore is 5. GCS motor subscore is 6.   Psychiatric:         Attention and Perception: Attention and perception normal.         Mood and Affect: Mood and affect normal.         Speech: Speech normal.         Behavior: Behavior normal. Behavior is cooperative.         Thought Content: Thought content normal.         Cognition and Memory: Cognition normal. He exhibits impaired recent memory.         Judgment: Judgment normal.     Fluids    Intake/Output Summary (Last 24 hours) at 1/2/2021 0934  Last data filed at 1/1/2021 1930  Gross per 24 hour   Intake 240 ml   Output 800 ml   Net -560 ml     Laboratory    Imaging  IR-US GUIDED PIV   Final Result    Ultrasound-guided PERIPHERAL IV INSERTION performed by    qualified nursing staff as above.      IR-MIDLINE CATHETER INSERTION WO GUIDANCE > AGE 3   Final Result                  Ultrasound-guided midline placement performed by qualified nursing staff    as above.          IR-US GUIDED PIV   Final Result    Ultrasound-guided PERIPHERAL IV INSERTION performed by    qualified nursing staff as above.      IR-MIDLINE CATHETER INSERTION WO GUIDANCE > AGE 3   Final Result                  Ultrasound-guided midline placement performed by qualified nursing staff    as above.          US-KOSTAS SINGLE LEVEL BILAT   Final Result      CT-HEAD W/O   Final Result      No acute intracranial abnormality      DX-TIBIA AND FIBULA LEFT   Final Result      1.  Healed distal metaphyseal fractures of the left fibula and tibia with tibial IM layla in place.      2.  No acute fracture or dislocation.      3.  No radiopaque soft tissue foreign body.      DX-FEMUR-2+ LEFT   Final Result      1.  No radiographic evidence  of acute traumatic injury left femur.      2.  Unchanged cortical thickening in the posterior aspect of the distal femoral diaphysis which may represent sequela of prior injury or infection.      3.  No soft tissue foreign body identified.      US-EXTREMITY VENOUS LOWER UNILAT LEFT   Final Result      DX-CHEST-PORTABLE (1 VIEW)   Final Result      No acute cardiopulmonary abnormality.         Assessment/Plan  * Wound of left leg- (present on admission)  Assessment & Plan  -Wound vac back in place.   -Further management per wound care/LPS/ortho.   -Pain control as needed.     Encephalopathy- (present on admission)  Assessment & Plan  -Acute on chronic, likely due Wernicke's. Improved.   -Per psychiatry and Dr. Velázquez: Patient is incapacitated.  -Guardianship and placement pending.  Patient is contesting guardianship, hearing is now scheduled for 1/29/2021 at 10 AM.    Psychiatric disorder- (present on admission)  Assessment & Plan  -Unspecified.  -Continue Risperdal and Depakote.  -Psychiatry has consulted and deemed him incapacitated to leave AMA or make medical decisions.  -Pending guardianship, which he is contesting.    Essential hypertension- (present on admission)  Assessment & Plan  -Well controlled on amlodipine.    Flexion contracture of knee, 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)  Assessment & Plan  -Secondary to chronic wound in that area.  -PT/OT.  -Does not seem to limit his mobility.  Is frequently ambulatory.    Infection of wound due to methicillin resistant Staphylococcus aureus (MRSA)- (present on admission)  Assessment & Plan  -History of MRSA.  -Poor compliance with OP wound care. Poor compliance with wound vac at times.   -Continue pain control as needed.  -LPS and wound care following - debridements and wound VAC changes per their recommendations  -Cultures repeated 12/14 - positive for Proteus species, pan sensitive.  Completed regimen of augmentin.     Emphysema/COPD (HCC)- (present on  admission)  Assessment & Plan  -Chronic and stable off medication, without acute exacerbation.    Alcohol dependence (HCC)- (present on admission)  Assessment & Plan  -History of. Abstinent since admission.    Protein malnutrition (HCC)- (present on admission)  Assessment & Plan  -Body mass index is 21.35 kg/m².   -Continue TID supplements.  -Encourage PO intake.    Tobacco dependence- (present on admission)  Assessment & Plan  -Abstinent since admission.     VTE prophylaxis: Jonathan Ohara, MSN, RN, APRN, ACNPC-AG, CCRN  Nurse Practitioner, [facility]  [phone]    1/2/2021    9:34 AM

## 2021-01-03 PROCEDURE — 700111 HCHG RX REV CODE 636 W/ 250 OVERRIDE (IP): Performed by: NURSE PRACTITIONER

## 2021-01-03 PROCEDURE — 770001 HCHG ROOM/CARE - MED/SURG/GYN PRIV*

## 2021-01-03 PROCEDURE — 700102 HCHG RX REV CODE 250 W/ 637 OVERRIDE(OP): Performed by: NURSE PRACTITIONER

## 2021-01-03 PROCEDURE — 700101 HCHG RX REV CODE 250: Performed by: NURSE PRACTITIONER

## 2021-01-03 PROCEDURE — A9270 NON-COVERED ITEM OR SERVICE: HCPCS | Performed by: NURSE PRACTITIONER

## 2021-01-03 RX ADMIN — AMLODIPINE BESYLATE 5 MG: 5 TABLET ORAL at 05:40

## 2021-01-03 RX ADMIN — GABAPENTIN 200 MG: 100 CAPSULE ORAL at 17:15

## 2021-01-03 RX ADMIN — DIVALPROEX SODIUM 125 MG: 125 CAPSULE ORAL at 21:52

## 2021-01-03 RX ADMIN — HYDROXYZINE HYDROCHLORIDE 25 MG: 50 TABLET, FILM COATED ORAL at 19:14

## 2021-01-03 RX ADMIN — RISPERIDONE 0.5 MG: 0.5 TABLET ORAL at 05:40

## 2021-01-03 RX ADMIN — ENOXAPARIN SODIUM 40 MG: 40 INJECTION SUBCUTANEOUS at 05:41

## 2021-01-03 RX ADMIN — OXYCODONE HYDROCHLORIDE 10 MG: 10 TABLET ORAL at 17:15

## 2021-01-03 RX ADMIN — DIVALPROEX SODIUM 125 MG: 125 CAPSULE ORAL at 05:40

## 2021-01-03 RX ADMIN — GABAPENTIN 200 MG: 100 CAPSULE ORAL at 05:40

## 2021-01-03 RX ADMIN — LIDOCAINE 1 PATCH: 50 PATCH CUTANEOUS at 12:54

## 2021-01-03 RX ADMIN — OXYCODONE HYDROCHLORIDE 10 MG: 10 TABLET ORAL at 01:03

## 2021-01-03 RX ADMIN — RISPERIDONE 1 MG: 1 TABLET ORAL at 17:15

## 2021-01-03 RX ADMIN — OXYCODONE HYDROCHLORIDE 10 MG: 10 TABLET ORAL at 08:15

## 2021-01-03 RX ADMIN — DIVALPROEX SODIUM 125 MG: 125 CAPSULE ORAL at 01:03

## 2021-01-03 RX ADMIN — DIVALPROEX SODIUM 125 MG: 125 CAPSULE ORAL at 12:54

## 2021-01-03 RX ADMIN — GABAPENTIN 200 MG: 100 CAPSULE ORAL at 12:54

## 2021-01-03 RX ADMIN — CYCLOBENZAPRINE 10 MG: 10 TABLET, FILM COATED ORAL at 19:14

## 2021-01-03 ASSESSMENT — PAIN DESCRIPTION - PAIN TYPE
TYPE: ACUTE PAIN
TYPE: ACUTE PAIN

## 2021-01-03 NOTE — CARE PLAN
Problem: Safety  Goal: Will remain free from falls  Description: Pt mobilizes frequently independently.   Outcome: PROGRESSING AS EXPECTED  Note:  Treaded socks on, shoes on, bed in low, locked position  Call light within reach, patient is able to call but sometimes will  doorway and ask for help.  Educations provided about call light use, Patient verbalized understanding.      Problem: Pain Management  Goal: Pain level will decrease to patient's comfort goal  Outcome: PROGRESSING AS EXPECTED  Flowsheets (Taken 1/3/2021 0811)  Comfort Goal:   Perform Activity   Sleep Comfortably  Non Verbal Scale:   Calm   Unlabored Breathing  Pain Rating Scale (NPRS): 8  Note:  Patient states he still has pain  but the ordered pain medication works well for him at this time. Will continue to monitor for pain and response to pain interventions.

## 2021-01-04 PROCEDURE — 97606 NEG PRS WND THER DME>50 SQCM: CPT

## 2021-01-04 PROCEDURE — 770001 HCHG ROOM/CARE - MED/SURG/GYN PRIV*

## 2021-01-04 PROCEDURE — 700102 HCHG RX REV CODE 250 W/ 637 OVERRIDE(OP): Performed by: NURSE PRACTITIONER

## 2021-01-04 PROCEDURE — A9270 NON-COVERED ITEM OR SERVICE: HCPCS | Performed by: NURSE PRACTITIONER

## 2021-01-04 PROCEDURE — 700101 HCHG RX REV CODE 250: Performed by: NURSE PRACTITIONER

## 2021-01-04 PROCEDURE — 11045 DBRDMT SUBQ TISS EACH ADDL: CPT | Performed by: NURSE PRACTITIONER

## 2021-01-04 PROCEDURE — 11042 DBRDMT SUBQ TIS 1ST 20SQCM/<: CPT | Performed by: NURSE PRACTITIONER

## 2021-01-04 PROCEDURE — 700111 HCHG RX REV CODE 636 W/ 250 OVERRIDE (IP): Performed by: NURSE PRACTITIONER

## 2021-01-04 RX ADMIN — GABAPENTIN 200 MG: 100 CAPSULE ORAL at 12:18

## 2021-01-04 RX ADMIN — GABAPENTIN 200 MG: 100 CAPSULE ORAL at 18:01

## 2021-01-04 RX ADMIN — OXYCODONE HYDROCHLORIDE 10 MG: 10 TABLET ORAL at 12:18

## 2021-01-04 RX ADMIN — DIVALPROEX SODIUM 125 MG: 125 CAPSULE ORAL at 21:49

## 2021-01-04 RX ADMIN — AMLODIPINE BESYLATE 5 MG: 5 TABLET ORAL at 04:55

## 2021-01-04 RX ADMIN — RISPERIDONE 1 MG: 1 TABLET ORAL at 18:01

## 2021-01-04 RX ADMIN — ENOXAPARIN SODIUM 40 MG: 40 INJECTION SUBCUTANEOUS at 04:55

## 2021-01-04 RX ADMIN — OXYCODONE HYDROCHLORIDE 10 MG: 10 TABLET ORAL at 04:55

## 2021-01-04 RX ADMIN — CYCLOBENZAPRINE 10 MG: 10 TABLET, FILM COATED ORAL at 18:01

## 2021-01-04 RX ADMIN — DIVALPROEX SODIUM 125 MG: 125 CAPSULE ORAL at 14:27

## 2021-01-04 RX ADMIN — RISPERIDONE 0.5 MG: 0.5 TABLET ORAL at 04:55

## 2021-01-04 RX ADMIN — GABAPENTIN 200 MG: 100 CAPSULE ORAL at 04:55

## 2021-01-04 RX ADMIN — OXYCODONE HYDROCHLORIDE 10 MG: 10 TABLET ORAL at 20:16

## 2021-01-04 RX ADMIN — HYDROXYZINE HYDROCHLORIDE 25 MG: 50 TABLET, FILM COATED ORAL at 18:01

## 2021-01-04 RX ADMIN — DIVALPROEX SODIUM 125 MG: 125 CAPSULE ORAL at 04:55

## 2021-01-04 RX ADMIN — LIDOCAINE 1 PATCH: 50 PATCH CUTANEOUS at 12:18

## 2021-01-04 ASSESSMENT — PAIN DESCRIPTION - PAIN TYPE
TYPE: ACUTE PAIN

## 2021-01-04 NOTE — CARE PLAN
Problem: Safety  Goal: Will remain free from falls  Description: Pt mobilizes frequently independently.   Outcome: PROGRESSING AS EXPECTED  Note:  Treaded socks in place,  Bed in low, locked position Call light within reach, patient does not call appropriately.  Education provided about call light use, patient is unable to retain education.      Problem: Bowel/Gastric:  Goal: Normal bowel function is maintained or improved  Outcome: PROGRESSING AS EXPECTED  Flowsheets (Taken 1/3/2021 2000 by Williams Melchor, R.N.)  Last BM: (per patient) 01/03/21  Note:  Patient has a BM yesterday but is a poor historian and is unable to recall when last BM was

## 2021-01-04 NOTE — PROGRESS NOTES
"WOUND CARE PROVIDER PROGRESS NOTE        HPI: 65-year-old male homelessness, EtOH use, tobacco dependence, chronic left lower extremity wound admitted 8/7/2020 with left lower extremity wound infection.  Patient was recently hospitalized at [facility] in April 2020, evaluated by orthopedic surgery who recommended wound care.  Wound culture grew MSSA and strep.  Patient was recently seen at [facility] prior to this admission was given a prescription for antibiotics but did not fill this.  Patient reports that he has had this wound for 8 to 10 years.  He reports that he fell and went \"ass over tea kettle\".  He reports that he would clean the wound almost daily with soap and water at the homeless shelter.  Per chart review, patient reported he developed the ulcer in May 2018 when he fell while hiking.  Patient was seen at wound care clinic in August 2018.  Patient had a  assisting him while he was living at [facility].  Wound VAC /was ordered however  had concerns with patient's compliancy and/ access to medical care.  Skilled nursing facility was contacted to have patient be admitted, however he was not accepted due to Medicaid.    Not on any blood thinners.  Patient ambulatory in [place].  Allergies reviewed.  He denies any allergies.    Surgery date: 11/19/2020 by Dr. Olvera  Procedure:1.  Irrigation and debridement of multiple compartments of the left leg with 2   separate 16 cm x 5 cm superficial ulcerations in posterior knee and calf.  2.  AmnioFix biologic sheet placement, left leg.  3.  Wound VAC placement, left leg, 16x5 + 16x5 cm.          Interval hx:   9/11/2020: pt calm cooperative. On zyvox. Seen during VAC and two layer compression wrap change. Pt tolerating VAC and wraps. Wound decreased in size.   9/18/2020: Patient denies any fevers, chills, nausea, vomiting.  He is tolerating the veraflo wound VAC and 2 layer compression wrap.  Wound is decreasing in size.  Increased " granular tissue noted, wound edges have improved however he does have rolled edges to popliteal fossa area.  Patient calm and cooperative, watching sports.  9/30/2020: Denies fevers, chills, nausea, vomiting.  Tolerating wound VAC and 2 layer compression wrap.  Refused nail care.  Wound culture positive for strep A and Proteus.  10/7/2020: completed 5 day course of zyvox. Denies fevers chills nausea vomiting.  Seen during veraflo VAC and 2 layer compression wrap change.    10/14/2020: Patient denies fevers, chills, nausea, vomiting.  Patient wound is malodorous complaining of increased pain to the wound.  Increased slough noted to wound bed despite veraflo wound VAC.  Reconsult ID.  10/16/2020: Patient seen during wound VAC change with [facility] wound care PT. patient denies fever, chills, nausea, vomiting.  Patient reports pain to wound and increase in pain with wound VAC change.  Patient has granular tissue throughout wound with mild amount of slough to posterior aspect of leg.  Malodorous with VAC removal.  Wound VAC nursing changed.  10/29/2020: Wound VAC was discontinued by wound team on 10/23/2020 due to slow progress and collagen was started.  Patient has completed 7-day antibiotic course of meropenem for multidrug resistant Proteus vulgaris.  Patient found to have lice and has been treated.  Nonfoul-smelling odor present with dressing removal.  11/5/2020: Seen with wound team during 2 layer compression dressing change and for excisional debridement.  No odor noted today.  Thick layer of biofilm noted.  Wound edges continue to improve but still remain to medial aspect of wound.  11/17/2020: Seen with wound team during dressing and 2 layer compression wrap change.  Patient with severely thick scar tissue to wound edges.  Excisional debridement with injectable lidocaine and epinephrine performed today.  Patient denies any fevers, chills, nausea, vomiting.  11/23/2020: POD #4 SP left leg I&D with biologic and VAC  placement.  VAC functioning.  Foul odor coming from wound.  11/30/2020: POD #11.  Patient tired of walking around with wound VAC machine.  But agreeable to continue with wound VAC.  Mild odor coming from wound with wound VAC removal.  12/7/2020: POD #18.  Seen during wound VAC change.  Sitter no longer at bedside.  12/14/2020 POD # 25.  Seen during wound VAC change with wound team.  Previous wound culture from last week showed organisms but they were not worked up.  New wound culture to be taken today.  Odor remains slightly diminished.  Drainage heavy, slightly decreased from last week.  Continue with nystatin powder and silver foam.  Patient tired today.  12/16/2020: Wound culture 12/14/2020 + for Proteus Mirabilis.  Discussed case with ID, previously following this admit.  Recommended Augmentin for 1 week.  12/17/2020: Patient not drinking boost.  Has stockpile in room.  Dietary consulted.  Started Augmentin yesterday. Contacted micro to review culture.  So far growing Proteus and diphtheroids.  Micro to assess for fungal component.  1/4/2021: Wounds have  into 2 wounds again and are decreasing in size.  No longer doing silver foam but rather Arglaes powder and nystatin powder.  Odor remains.  Completed antibiotics.  Guardianship hearing is 1/29/2021.  Had arginaid supplement 4 times per day for 2 weeks that started on 12/17/2020.        Results:  Wound culture left leg 12/14/2020: No fungal growth, Proteus mirabilis  Wound culture left leg 12/7/2020:Light growth mixed organisms.   Covid screen not detected 11/17/2020  CBC 11/22/2020: WBC 6.5, hemoglobin and hematocrit 9.9/30.5.  Platelet count 403.  CBC with differential 10/14/2020: WBC 6, H&H 11/33.9  Wound culture: 9/28/2020: Positive for beta-hemolytic strep group A, Proteus.    Wound cx: 9/1/2020 mrsa, diphtheroids, beta hemolytic strep group A    8/7/2020: X-ray tib-fib left:  1.  Healed distal metaphyseal fractures of the left fibula and tibia  with tibial IM layla in place.     2.  No acute fracture or dislocation.     3.  No radiopaque soft tissue foreign body.      Arterial studies:   9/2/2020  Right.    Doppler waveforms of the common femoral artery are of high amplitude and    triphasic.    Doppler waveforms at the ankle are brisk and triphasic.    Ankle-brachial index is normal.       Left.    Doppler waveforms of the common femoral artery are of high amplitude and    triphasic.    Doppler waveforms at the ankle are brisk and triphasic.    Ankle-brachial index is normal.    Unable to obtained popliteal waveforms due to wound bandages.          Exam:    Palpable PT pulse to left foot +1 foot   +2 DP left foot  Difficulty palpating popliteal due to scar tissue  Able to extend left leg to 160 degrees. Able to flex left leg around 80 degrees      Left lower leg full-thickness ulcer  Odor remains with heavy drainage,   Wounds have  again into 2 wounds with skin bridge at popliteal fossa  Ace wrap in place instead of 2 layer compression wrap      Lateral ulcer:  Wound has decreased in size  Wound edges flat, slightly rolled to lateral aspect  Granular tissue noted, with scattered /slough  Amnio fix skin substitute  incorporated  No periwound erythema  Irritated periwound, using Dermatac drape  Edema not controlled with Ace wrap    Medial ulcer:  Wound decreased in size   granular tissue with scattered slough  Amnio fix  incorporated  Edema not controlled with Ace wrap        Photos  left leg medial view from 12/30/2020        Left leg lateral wound from 12/30/2020                PROCEDURE:   -Curette used to debride wound bed.  Excisional debridement was performed to remove devitalized tissue until healthy, bleeding tissue was visualized.   Entire surface of both wounds, 133 cm2 debrided.  Tissue debrided into the subcutaneous layer.    -Bleeding controlled with manual pressure.    -Wound care completed by wound RN, refer to flowsheet  -Patient  tolerated the procedure well, without c/o pain or discomfort.       Post debridement photo lateral wound        Posterior debridement photo medial wound                        ASSESSMENT/PLAN:  65-year-old male with homelessness, EtOH use, tobacco use, and chronic left leg ulcer.  SP left leg I&D with amnio fix and wound VAC placement by Dr. Olvera on 11/19/2020    -Wound has  into 2 wounds again with skin bridge to popliteal fossa.  -Bothwounds have decreased in size.  -Biofilm present. Excisional debridement performed today.  Medically necessary to promote wound healing.  -Odor remains, heavy drainage remains   -Increased edema from the Ace wrap.  restart 2 layer compression wrap.  -Patient has completed 1 week of Augmentin and L-arginine supplement for 2 weeks in December 2020  Hold off on ACell application at this time.  Will continue to monitor      PLAN  -Continue 3 times a week VAC dressing changes.  This will better control drainage and odor.  -Continue 3 times a week debridement to wound to reduce biofilm  -Continue Arglaes powder and nystatin powder  -Continue 2 layer compression wrap extending to thigh  - Patient to continue to be seen by wound care team while admitted  Patient to continue to be followed by wound care nurse practitioner       Discharge plan:  Pending guardianship      D/W: Patient, RN, Sera Simons RN wound care,

## 2021-01-04 NOTE — WOUND TEAM
[facility]  Inpatient Services  Wound and Skin Care Progress Note    Admission Date: 8/7/2020     Last order of IP CONSULT TO WOUND CARE was found on 9/1/2020 from Hospital Encounter on 8/7/2020     HPI, PMH, SH: Reviewed    Unit where seen by Wound Team: [id number]    WOUND CONSULT/FOLLOW UP RELATED TO:  Left leg follow-up; scheduled npwt dressing change.    Self Report / Pain Level: Pt reports mild pain with debridement; otherwise denies.    OBJECTIVE:  Gauze on wounds    WOUND TYPE, LOCATION, CHARACTERISTICS (Pressure Injuries: location, stage, POA or date identified)     Negative Pressure Wound Therapy 11/20/20 Leg Lateral;Posterior;Medial Left (Active)   NPWT Pump Mode / Pressure Setting Ulta;125 mmHg 01/04/21 1258   Dressing Type Medium;Black Foam (Regular) 01/04/21 1258   Number of Foam Pieces Used 3 01/04/21 1258   Canister Changed No 01/04/21 1258   Output (mL) 200 mL 01/03/21 0811   NEXT Dressing Change/Treatment Date 01/06/21 01/04/21 1258   WOUND NURSE ONLY - Time Spent with Patient (mins) 60 01/04/21 1258        Wound 11/19/20 Full Thickness Wound Leg Lateral;Posterior;Medial Left (Active)   Wound Image    01/04/21 1258   Site Assessment Pink;Red 01/04/21 1258   Periwound Assessment Clean;Dry;Intact 01/04/21 1258   Margins Defined edges;Attached edges 01/04/21 1258   Closure Secondary intention 01/04/21 1258   Drainage Amount Large 01/04/21 1258   Drainage Description Serosanguineous 01/04/21 1258   Treatments Cleansed;Irrigation;CSWD - Conservative Sharp Wound Debridement;Compression;Site care 01/04/21 1258   Wound Cleansing Approved Wound Cleanser 01/04/21 1258   Periwound Protectant Skin Protectant Wipes to Periwound;Drape 01/04/21 1258   Dressing Cleansing/Solutions Not Applicable 01/04/21 1258   Dressing Options Wound Vac 01/04/21 1258   Dressing Changed Changed 01/04/21 1258   Dressing Status Clean;Dry;Intact 01/04/21 1258   Dressing Change/Treatment Frequency By Wound Team Only 01/04/21  1258   NEXT Dressing Change/Treatment Date 21 1258   NEXT Weekly Photo (Inpatient Only) 21 1217   Non-staged Wound Description Full thickness 21 1258   Wound Bed Granulation (%) 70 % 20 1500   Wound Bed Slough (%) 30 % 20 1500   Wound Bed Granulation (%) - Post-Procedure 100 % 20 1300   Shape irregular 21 1258   Wound Odor Strong 21 1258   Pulses Left;2+;DP;PT 21 1258   Exposed Structures None 21 1258   WOUND NURSE ONLY - Time Spent with Patient (mins) 60 21 1100           Wounds are no longer connected - measured as 2 wounds - posterior thigh and medial leg.        VASCULAR    CESAR:   CESAR Results, Last 30 Days Us-cesar Single Level Bilat    Result Date: 2020  Narrative  Vascular Laboratory  Conclusions  1.  Normal bilateral CESAR's.  GRUPO GANN  Age:    64    Gender:     M  MRN:    [id number]  :    1955      BSA:  Exam Date:     2020 09:59  Room #:     Inpatient  Priority:     Routine  Ht (in):             Wt (lb):  Ordering Physician:        EDDA CANADA  Referring Physician:       [id number] Morrissey  Sonographer:               Deja Ellis RDMS                             T  Study Type:                Complete Bilateral  Technical Quality:         Adequate  Indications:     Ulceration of LE  CPT Codes:       44001  ICD Codes:       707.1  History:         Nonhealing wound of left lower extremity.  Limitations:                 RIGHT  Waveform            Systolic BPs (mmHg)                             117           Brachial  Triphasic                                Common Femoral  Triphasic                  138           Posterior Tibial  Triphasic                  132           Dorsalis Pedis                                           Peroneal                             1.18          CESAR                                           TBI                        LEFT  Waveform        Systolic BPs (mmHg)                             114           Brachial  Triphasic                                Common Femoral  Triphasic                  127           Posterior Tibial  Triphasic                  122           Dorsalis Pedis                                           Peroneal                             1.09          KOSTAS                                           TBI  Findings  Right.  Doppler waveforms of the common femoral artery are of high amplitude and  triphasic.  Doppler waveforms at the ankle are brisk and triphasic.  Ankle-brachial index is normal.  Left.  Doppler waveforms of the common femoral artery are of high amplitude and  triphasic.  Doppler waveforms at the ankle are brisk and triphasic.  Ankle-brachial index is normal.  Unable to obtained popliteal waveforms due to wound bandages.  Additional testing was not performed in accordance with lower extremity  arterial evaluation protocol.  Clover Maya  (Electronically Signed)  Final Date:      02 September 2020                   11:14    Lab Values:    Lab Results   Component Value Date/Time    WBC 6.5 11/22/2020 02:44 AM    RBC 3.54 (L) 11/22/2020 02:44 AM    HEMOGLOBIN 9.9 (L) 11/22/2020 02:44 AM    HEMATOCRIT 30.5 (L) 11/22/2020 02:44 AM    CREACTPROT 1.07 (H) 08/12/2020 01:55 PM    SEDRATEWES 85 (H) 08/07/2020 01:20 PM    HBA1C 5.7 (H) 11/09/2018 06:30 PM        Culture Results show:  Recent Results (from the past 720 hour(s))   CULTURE WOUND W/ GRAM STAIN    Collection Time: 09/01/20  2:00 PM    Specimen: Left Leg; Wound   Result Value Ref Range    Significant Indicator POS (POS)     Source WND     Site LEFT LEG     Culture Result - (A)     Gram Stain Result       Moderate Gram positive cocci.  Moderate Gram positive rods.      Culture Result (A)      Beta Hemolytic Streptococcus group A  Moderate growth      Culture Result (A)      Methicillin Resistant Staphylococcus aureus  Moderate growth       Culture Result (A)      Diphtheroids  Moderate growth  Mixed morphologies.         INTERVENTIONS BY WOUND TEAM:    Dressings removed cleansed wound bed with wound cleanser.  Meredith-wound cleansed with wound cleanser. Selective sharp debrided with curette 40 cm2 yellow slough and wound cleansed again.  Meredith-wound skin protected with benzoin and dermatac drape.  .  Nystatin and silver powder to wound bed, black foam to all of wound bed, secured with regular drape.  TRAC pad placed.  NPWT resumed, no leaks noted. 2 layer coflex compression to left LE.  NPWT to 150mmHg due to drainage.      Interdisciplinary consultation: Patient, Bedside RN    EVALUATION / RATIONALE FOR TREATMENT:    01/04/2021: 2 layer compression wrap re-applied to left LE due to ACE wrap leaving too much indentation to left LE.   12/31/2020 thigh wound much narrower.  Biofilm redeveloping and odor still present.   12/27/20: Wound bed granular and odor has lessen but still persists.   12/18/20 wound length decreased.  Odor persists.    12/14/2020: re-cultured wound bed.  12/11/2020 POD 23 Length of wounds has decreased, width has not changed.  Odor persists.  Pt continues to become angry with VAC when it limits his mobility.    12/7/2020 POD 19 odor persists, improved wound bed after debridement.  Possible bacterial vs yeast vs fungal infection.  Culture taken.  Nystatin to treat possible fungal infection, silver foam to decrease microbial population.    12/4/2020 POD# 16 odor worsening, more yellow tissue present, will add nystatin and silver foam next week and consider a culture  11/30 POD#11 granular buds visible to wound bed.  Same as prior.   11/27 POD#8 wound is odorous likely related to biologic dressing.  No overt signs of infection.  Granular buds are visible through adaptic.  Edges do not appear as thick as they were prior to last sx.  Continue with current treatment.    11/23: POD#4 s/p I&D of left LE wounds and biologic placed by   May on 11/19.  Wound bed is flatter and raised edges scar tissue seems to be gone.   11/14 Posterior thigh aspect of wound deteriorating, will increase frequency of treatment to keep biofilm down and hopefully avoid systemic abx.  Will include thigh in compression wrap.  Will consider culture if improvement is not seen in the next few treatments.    11/10: APRN unavailable at this time.  Will re-schedule excisional debridement to next week.   11/5: Plan for debridement of scarred edges on Tues by LPS. Tendon exposed to posterior aspect of wound, behind knee. Continue with current dressing care.  10/29: Excisional debridement by Asaf ELLER of scar tissue along the proximal edge of the posterior knee and lateral thigh.  Lateral aspect of thigh is flatten.  Medial aspect of knee has thick scar tissue that was excisionally debrided by Asaf ELLER.  Fully granulated throughout the wound bed.   10/23 wound bed mostly granular, superficial in depth, progress has been slow with the wound VAC so will trial collagen product to encourage new tissue growth.    10/19: wound bed has improved in color and is fully granulated.  Addison-wound has improved, denudation has resolved. APRN continuing with serial weekly debridements as needed.   10/16: wound friable pt now on iv abx per ID.  Indurated edges addressed with drape and foam.  Addison wound likely has yeast infection - addressing with silver powder crusting  10/14: patient seen with Asaf ELLER, stopping veraflo instillation.  Starting silver powder and regular wound VAC to see if it will help with bioburden.  Possible colonization of bacteria.  ID involved.   10/12: Inflammation noted to the proximal part of the wound bed.  Will continue to monitor, possible vac break on Wednesday to help addison-skin inflammation.   10/7: Excisional debridement performed by Asaf ELLER. Will need to continue selective debridement with NPWT changes, and weekly  excisional debridement with APRN to flatten out the scar tissue.  IV abx therapy ended 10/5.   10/5: Lateral wound still with some slough, both wounds smaller per measurements. Medial wound with all granular tissue.  9/30: Patient to start abx therapy for 7 days course per Dr. Ellis.  Started dakin's instillation to veraflo  9/28: malodorous today, culture taken.    9/25: Odor noted, though unclear if from wound or pt, consider shower before next Vac change. Consider regular vac next change, wound granular and superficial.   9/23: Patient still awaiting guardianship for placement.   9/18: wound granulating nicely and responding well to current treatment.    9/16: Wound bed with granular tissue, edges are thick but appear better than previous assessments. . NPWT VF to encourage closure by secondary intention and manage drainage while encouraging oxygenation and granulation to the wound bed. 2 layer compression to assist in decrease of swelling and encourage blood flow to wounds. Wound team to follow up and change 3x/week.      Goals: Steady decrease in wound area and depth weekly.    NURSING PLAN OF CARE ORDERS (X):    Dressing changes: See Dressing Care orders: X  Skin care: See Skin Care orders:   Rectal tube care: See Rectal Tube Care orders:   Other orders:      WOUND TEAM PLAN OF CARE:   Dressing changes by wound team:        Follow up 3 times weekly:                NPWT change 3 times weekly:  X,  NPWT changes 3x/ weekly with ace wrap.  Follow up 1-2 times weekly:      Follow up Bi-Monthly:                   Follow up as needed:       Other (explain):     Anticipated discharge plans:  LTACH:        SNF/Rehab: X - patient will need ongoing wound care for LLE upon discharge - 3x/weekly           Home Health Care:           Outpatient Wound Center:            Self Care:

## 2021-01-05 PROCEDURE — 700102 HCHG RX REV CODE 250 W/ 637 OVERRIDE(OP): Performed by: NURSE PRACTITIONER

## 2021-01-05 PROCEDURE — 700111 HCHG RX REV CODE 636 W/ 250 OVERRIDE (IP): Performed by: NURSE PRACTITIONER

## 2021-01-05 PROCEDURE — A9270 NON-COVERED ITEM OR SERVICE: HCPCS | Performed by: NURSE PRACTITIONER

## 2021-01-05 PROCEDURE — 770001 HCHG ROOM/CARE - MED/SURG/GYN PRIV*

## 2021-01-05 PROCEDURE — 700101 HCHG RX REV CODE 250: Performed by: NURSE PRACTITIONER

## 2021-01-05 RX ADMIN — DIVALPROEX SODIUM 125 MG: 125 CAPSULE ORAL at 12:01

## 2021-01-05 RX ADMIN — DIVALPROEX SODIUM 125 MG: 125 CAPSULE ORAL at 05:56

## 2021-01-05 RX ADMIN — ENOXAPARIN SODIUM 40 MG: 40 INJECTION SUBCUTANEOUS at 05:56

## 2021-01-05 RX ADMIN — OXYCODONE HYDROCHLORIDE 10 MG: 10 TABLET ORAL at 12:00

## 2021-01-05 RX ADMIN — OXYCODONE HYDROCHLORIDE 10 MG: 10 TABLET ORAL at 18:00

## 2021-01-05 RX ADMIN — AMLODIPINE BESYLATE 5 MG: 5 TABLET ORAL at 05:56

## 2021-01-05 RX ADMIN — DIVALPROEX SODIUM 125 MG: 125 CAPSULE ORAL at 21:17

## 2021-01-05 RX ADMIN — OXYCODONE HYDROCHLORIDE 10 MG: 10 TABLET ORAL at 05:56

## 2021-01-05 RX ADMIN — GABAPENTIN 200 MG: 100 CAPSULE ORAL at 05:55

## 2021-01-05 RX ADMIN — LIDOCAINE 1 PATCH: 50 PATCH CUTANEOUS at 12:00

## 2021-01-05 RX ADMIN — CYCLOBENZAPRINE 10 MG: 10 TABLET, FILM COATED ORAL at 16:21

## 2021-01-05 RX ADMIN — RISPERIDONE 0.5 MG: 0.5 TABLET ORAL at 05:55

## 2021-01-05 RX ADMIN — HYDROXYZINE HYDROCHLORIDE 25 MG: 50 TABLET, FILM COATED ORAL at 16:21

## 2021-01-05 RX ADMIN — RISPERIDONE 1 MG: 1 TABLET ORAL at 16:24

## 2021-01-05 RX ADMIN — GABAPENTIN 200 MG: 100 CAPSULE ORAL at 12:00

## 2021-01-05 RX ADMIN — GABAPENTIN 200 MG: 100 CAPSULE ORAL at 16:21

## 2021-01-05 ASSESSMENT — PAIN DESCRIPTION - PAIN TYPE
TYPE: ACUTE PAIN;CHRONIC PAIN
TYPE: ACUTE PAIN;SURGICAL PAIN

## 2021-01-05 NOTE — PROGRESS NOTES
Assumed care of pt at 1900. Pain meds administered per order. Pt rested throughout the night, all needs met. Fall precautions in place.  Hourly rounding conducted. Call bell within reach.

## 2021-01-05 NOTE — CARE PLAN
Problem: Safety  Goal: Will remain free from falls  Description: Pt mobilizes frequently independently.   Outcome: PROGRESSING AS EXPECTED     Problem: Discharge Barriers/Planning  Goal: Patient's continuum of care needs will be met  Outcome: PROGRESSING SLOWER THAN EXPECTED

## 2021-01-06 PROCEDURE — A9270 NON-COVERED ITEM OR SERVICE: HCPCS | Performed by: NURSE PRACTITIONER

## 2021-01-06 PROCEDURE — 700111 HCHG RX REV CODE 636 W/ 250 OVERRIDE (IP): Performed by: NURSE PRACTITIONER

## 2021-01-06 PROCEDURE — 97606 NEG PRS WND THER DME>50 SQCM: CPT

## 2021-01-06 PROCEDURE — 700102 HCHG RX REV CODE 250 W/ 637 OVERRIDE(OP): Performed by: NURSE PRACTITIONER

## 2021-01-06 PROCEDURE — 700101 HCHG RX REV CODE 250: Performed by: NURSE PRACTITIONER

## 2021-01-06 PROCEDURE — 770001 HCHG ROOM/CARE - MED/SURG/GYN PRIV*

## 2021-01-06 RX ADMIN — DIVALPROEX SODIUM 125 MG: 125 CAPSULE ORAL at 14:28

## 2021-01-06 RX ADMIN — RISPERIDONE 1 MG: 1 TABLET ORAL at 18:07

## 2021-01-06 RX ADMIN — DIVALPROEX SODIUM 125 MG: 125 CAPSULE ORAL at 05:41

## 2021-01-06 RX ADMIN — GABAPENTIN 200 MG: 100 CAPSULE ORAL at 05:41

## 2021-01-06 RX ADMIN — CYCLOBENZAPRINE 10 MG: 10 TABLET, FILM COATED ORAL at 21:56

## 2021-01-06 RX ADMIN — HYDROXYZINE HYDROCHLORIDE 25 MG: 50 TABLET, FILM COATED ORAL at 21:56

## 2021-01-06 RX ADMIN — OXYCODONE HYDROCHLORIDE 10 MG: 10 TABLET ORAL at 05:41

## 2021-01-06 RX ADMIN — GABAPENTIN 200 MG: 100 CAPSULE ORAL at 18:07

## 2021-01-06 RX ADMIN — CYCLOBENZAPRINE 10 MG: 10 TABLET, FILM COATED ORAL at 05:55

## 2021-01-06 RX ADMIN — OXYCODONE HYDROCHLORIDE 10 MG: 10 TABLET ORAL at 12:16

## 2021-01-06 RX ADMIN — HYDROXYZINE HYDROCHLORIDE 25 MG: 50 TABLET, FILM COATED ORAL at 14:28

## 2021-01-06 RX ADMIN — HYDROXYZINE HYDROCHLORIDE 25 MG: 50 TABLET, FILM COATED ORAL at 05:55

## 2021-01-06 RX ADMIN — DIVALPROEX SODIUM 125 MG: 125 CAPSULE ORAL at 21:56

## 2021-01-06 RX ADMIN — RISPERIDONE 0.5 MG: 0.5 TABLET ORAL at 05:41

## 2021-01-06 RX ADMIN — GABAPENTIN 200 MG: 100 CAPSULE ORAL at 12:16

## 2021-01-06 RX ADMIN — ENOXAPARIN SODIUM 40 MG: 40 INJECTION SUBCUTANEOUS at 05:40

## 2021-01-06 RX ADMIN — LIDOCAINE 1 PATCH: 50 PATCH CUTANEOUS at 12:16

## 2021-01-06 RX ADMIN — AMLODIPINE BESYLATE 5 MG: 5 TABLET ORAL at 05:41

## 2021-01-06 RX ADMIN — CYCLOBENZAPRINE 10 MG: 10 TABLET, FILM COATED ORAL at 12:16

## 2021-01-06 RX ADMIN — OXYCODONE HYDROCHLORIDE 10 MG: 10 TABLET ORAL at 18:07

## 2021-01-06 ASSESSMENT — PAIN DESCRIPTION - PAIN TYPE
TYPE: SURGICAL PAIN
TYPE: ACUTE PAIN;SURGICAL PAIN
TYPE: SURGICAL PAIN;ACUTE PAIN
TYPE: ACUTE PAIN;SURGICAL PAIN
TYPE: SURGICAL PAIN

## 2021-01-06 NOTE — WOUND TEAM
[facility]  Inpatient Services  Wound and Skin Care Progress Note    Admission Date: 8/7/2020     Last order of IP CONSULT TO WOUND CARE was found on 9/1/2020 from Hospital Encounter on 8/7/2020     HPI, PMH, SH: Reviewed    Unit where seen by Wound Team: [id number]    WOUND CONSULT/FOLLOW UP RELATED TO:  Left leg follow-up; scheduled npwt dressing change.    Self Report / Pain Level: Pt reports mild pain with debridement; otherwise denies.    OBJECTIVE:  Intact NPWT and 2 layer compression wrap    WOUND TYPE, LOCATION, CHARACTERISTICS (Pressure Injuries: location, stage, POA or date identified)     Negative Pressure Wound Therapy 11/20/20 Leg Lateral;Posterior;Medial Left (Active)   NPWT Pump Mode / Pressure Setting Ulta;Continuous;125 mmHg 01/06/21 1300   Dressing Type Medium;Black Foam (Regular) 01/06/21 1300   Number of Foam Pieces Used 3 01/06/21 1300   Canister Changed No 01/06/21 1300   Output (mL) 200 mL 01/03/21 0811   NEXT Dressing Change/Treatment Date 01/08/21 01/06/21 1300   WOUND NURSE ONLY - Time Spent with Patient (mins) 60 01/06/21 1300      Wound 11/19/20 Full Thickness Wound Leg Lateral;Posterior;Medial Left (Active)   Wound Image    01/04/21 1258   Site Assessment Pink;Red 01/06/21 1300   Periwound Assessment Clean;Dry;Intact;Scar tissue 01/06/21 1300   Margins Defined edges;Attached edges 01/06/21 1300   Closure Secondary intention 01/06/21 1300   Drainage Amount Moderate 01/06/21 1300   Drainage Description Serosanguineous 01/06/21 1300   Treatments Cleansed;Irrigation;Site care 01/06/21 1300   Wound Cleansing Approved Wound Cleanser 01/06/21 1300   Periwound Protectant Skin Protectant Wipes to Periwound;Drape 01/06/21 1300   Dressing Cleansing/Solutions Not Applicable 01/06/21 1300   Dressing Options Wound Vac;Compression Wrap Two Layer 01/06/21 1300   Dressing Changed Changed 01/06/21 1300   Dressing Status Clean;Dry;Intact 01/06/21 1300   Dressing Change/Treatment Frequency By  Wound Team Only 21 1300   NEXT Dressing Change/Treatment Date 21 1300   NEXT Weekly Photo (Inpatient Only) 21 1300   Non-staged Wound Description Full thickness 21 1300   Wound Bed Granulation (%) 70 % 20 1500   Wound Bed Slough (%) 30 % 20 1500   Wound Bed Granulation (%) - Post-Procedure 100 % 20 1300   Shape Irregular 21 1300   Wound Odor Strong 21 1300   Pulses Left;2+;DP;PT 21 1300   Exposed Structures None 21 1300   WOUND NURSE ONLY - Time Spent with Patient (mins) 60 21 1300         Wounds are no longer connected - measured as 2 wounds - posterior thigh and medial leg.        VASCULAR    ECSAR:   CESAR Results, Last 30 Days Us-cesar Single Level Bilat    Result Date: 2020  Narrative  Vascular Laboratory  Conclusions  1.  Normal bilateral CESAR's.  GRUPO GANN  Age:    64    Gender:     M  MRN:    [id number]  :    1955      BSA:  Exam Date:     2020 09:59  Room #:     Inpatient  Priority:     Routine  Ht (in):             Wt (lb):  Ordering Physician:        EDDA CANADA  Referring Physician:       [id number] Morrissey  Sonographer:               Deja Ellis RDMS                             T  Study Type:                Complete Bilateral  Technical Quality:         Adequate  Indications:     Ulceration of LE  CPT Codes:       26931  ICD Codes:       707.1  History:         Nonhealing wound of left lower extremity.  Limitations:                 RIGHT  Waveform            Systolic BPs (mmHg)                             117           Brachial  Triphasic                                Common Femoral  Triphasic                  138           Posterior Tibial  Triphasic                  132           Dorsalis Pedis                                           Peroneal                             1.18          CESAR                                            TBI                       LEFT  Waveform        Systolic BPs (mmHg)                             114           Brachial  Triphasic                                Common Femoral  Triphasic                  127           Posterior Tibial  Triphasic                  122           Dorsalis Pedis                                           Peroneal                             1.09          KOSTAS                                           TBI  Findings  Right.  Doppler waveforms of the common femoral artery are of high amplitude and  triphasic.  Doppler waveforms at the ankle are brisk and triphasic.  Ankle-brachial index is normal.  Left.  Doppler waveforms of the common femoral artery are of high amplitude and  triphasic.  Doppler waveforms at the ankle are brisk and triphasic.  Ankle-brachial index is normal.  Unable to obtained popliteal waveforms due to wound bandages.  Additional testing was not performed in accordance with lower extremity  arterial evaluation protocol.  Clover Maya  (Electronically Signed)  Final Date:      02 September 2020                   11:14    Lab Values:    Lab Results   Component Value Date/Time    WBC 6.5 11/22/2020 02:44 AM    RBC 3.54 (L) 11/22/2020 02:44 AM    HEMOGLOBIN 9.9 (L) 11/22/2020 02:44 AM    HEMATOCRIT 30.5 (L) 11/22/2020 02:44 AM    CREACTPROT 1.07 (H) 08/12/2020 01:55 PM    SEDRATEWES 85 (H) 08/07/2020 01:20 PM    HBA1C 5.7 (H) 11/09/2018 06:30 PM        Culture Results show:  Recent Results (from the past 720 hour(s))   CULTURE WOUND W/ GRAM STAIN    Collection Time: 09/01/20  2:00 PM    Specimen: Left Leg; Wound   Result Value Ref Range    Significant Indicator POS (POS)     Source WND     Site LEFT LEG     Culture Result - (A)     Gram Stain Result       Moderate Gram positive cocci.  Moderate Gram positive rods.      Culture Result (A)      Beta Hemolytic Streptococcus group A  Moderate growth      Culture Result (A)      Methicillin Resistant Staphylococcus  aureus  Moderate growth      Culture Result (A)      Diphtheroids  Moderate growth  Mixed morphologies.         INTERVENTIONS BY WOUND TEAM:    Dressings removed cleansed wound bed with wound cleanser.  Meredith-wound cleansed with wound cleanser. Selective sharp debrided with curette 40 cm2 yellow slough and wound cleansed again.  Meredith-wound skin protected with benzoin and dermatac drape.  .  Nystatin and silver powder to wound bed, black foam to all of wound bed, secured with regular drape.  TRAC pad placed.  NPWT resumed, no leaks noted. 2 layer coflex compression to left LE.  NPWT to 150mmHg due to drainage.      Interdisciplinary consultation: Patient, Bedside RN    EVALUATION / RATIONALE FOR TREATMENT:    01/04/2021: 2 layer compression wrap re-applied to left LE due to ACE wrap leaving too much indentation to left LE.   12/31/2020 thigh wound much narrower.  Biofilm redeveloping and odor still present.   12/27/20: Wound bed granular and odor has lessen but still persists.   12/18/20 wound length decreased.  Odor persists.    12/14/2020: re-cultured wound bed.  12/11/2020 POD 23 Length of wounds has decreased, width has not changed.  Odor persists.  Pt continues to become angry with VAC when it limits his mobility.    12/7/2020 POD 19 odor persists, improved wound bed after debridement.  Possible bacterial vs yeast vs fungal infection.  Culture taken.  Nystatin to treat possible fungal infection, silver foam to decrease microbial population.    12/4/2020 POD# 16 odor worsening, more yellow tissue present, will add nystatin and silver foam next week and consider a culture  11/30 POD#11 granular buds visible to wound bed.  Same as prior.   11/27 POD#8 wound is odorous likely related to biologic dressing.  No overt signs of infection.  Granular buds are visible through adaptic.  Edges do not appear as thick as they were prior to last sx.  Continue with current treatment.    11/23: POD#4 s/p I&D of left LE wounds and  biologic placed by Dr. Olvera on 11/19.  Wound bed is flatter and raised edges scar tissue seems to be gone.   11/14 Posterior thigh aspect of wound deteriorating, will increase frequency of treatment to keep biofilm down and hopefully avoid systemic abx.  Will include thigh in compression wrap.  Will consider culture if improvement is not seen in the next few treatments.    11/10: APRN unavailable at this time.  Will re-schedule excisional debridement to next week.   11/5: Plan for debridement of scarred edges on Tues by LPS. Tendon exposed to posterior aspect of wound, behind knee. Continue with current dressing care.  10/29: Excisional debridement by Asaf ELLER of scar tissue along the proximal edge of the posterior knee and lateral thigh.  Lateral aspect of thigh is flatten.  Medial aspect of knee has thick scar tissue that was excisionally debrided by Asaf ELLER.  Fully granulated throughout the wound bed.   10/23 wound bed mostly granular, superficial in depth, progress has been slow with the wound VAC so will trial collagen product to encourage new tissue growth.    10/19: wound bed has improved in color and is fully granulated.  Addison-wound has improved, denudation has resolved. APRN continuing with serial weekly debridements as needed.   10/16: wound friable pt now on iv abx per ID.  Indurated edges addressed with drape and foam.  Addison wound likely has yeast infection - addressing with silver powder crusting  10/14: patient seen with Asaf ELLER, stopping veraflo instillation.  Starting silver powder and regular wound VAC to see if it will help with bioburden.  Possible colonization of bacteria.  ID involved.   10/12: Inflammation noted to the proximal part of the wound bed.  Will continue to monitor, possible vac break on Wednesday to help addison-skin inflammation.   10/7: Excisional debridement performed by Asaf ELLER. Will need to continue selective debridement with NPWT  changes, and weekly excisional debridement with APRN to flatten out the scar tissue.  IV abx therapy ended 10/5.   10/5: Lateral wound still with some slough, both wounds smaller per measurements. Medial wound with all granular tissue.  9/30: Patient to start abx therapy for 7 days course per Dr. Ellis.  Started dakin's instillation to veraflo  9/28: malodorous today, culture taken.    9/25: Odor noted, though unclear if from wound or pt, consider shower before next Vac change. Consider regular vac next change, wound granular and superficial.   9/23: Patient still awaiting guardianship for placement.   9/18: wound granulating nicely and responding well to current treatment.    9/16: Wound bed with granular tissue, edges are thick but appear better than previous assessments. . NPWT VF to encourage closure by secondary intention and manage drainage while encouraging oxygenation and granulation to the wound bed. 2 layer compression to assist in decrease of swelling and encourage blood flow to wounds. Wound team to follow up and change 3x/week.      Goals: Steady decrease in wound area and depth weekly.    NURSING PLAN OF CARE ORDERS (X):    Dressing changes: See Dressing Care orders: X  Skin care: See Skin Care orders:   Rectal tube care: See Rectal Tube Care orders:   Other orders:      WOUND TEAM PLAN OF CARE:   Dressing changes by wound team:        Follow up 3 times weekly:                NPWT change 3 times weekly:  X,  NPWT changes 3x/ weekly with ace wrap.  Follow up 1-2 times weekly:      Follow up Bi-Monthly:                   Follow up as needed:       Other (explain):     Anticipated discharge plans:  LTACH:        SNF/Rehab: X - patient will need ongoing wound care for LLE upon discharge - 3x/weekly           Home Health Care:           Outpatient Wound Center:            Self Care:

## 2021-01-07 PROCEDURE — A9270 NON-COVERED ITEM OR SERVICE: HCPCS | Performed by: NURSE PRACTITIONER

## 2021-01-07 PROCEDURE — 700102 HCHG RX REV CODE 250 W/ 637 OVERRIDE(OP): Performed by: NURSE PRACTITIONER

## 2021-01-07 PROCEDURE — 99231 SBSQ HOSP IP/OBS SF/LOW 25: CPT | Performed by: NURSE PRACTITIONER

## 2021-01-07 PROCEDURE — 700101 HCHG RX REV CODE 250: Performed by: NURSE PRACTITIONER

## 2021-01-07 PROCEDURE — 700111 HCHG RX REV CODE 636 W/ 250 OVERRIDE (IP): Performed by: NURSE PRACTITIONER

## 2021-01-07 PROCEDURE — 770001 HCHG ROOM/CARE - MED/SURG/GYN PRIV*

## 2021-01-07 RX ADMIN — OXYCODONE HYDROCHLORIDE 10 MG: 10 TABLET ORAL at 06:30

## 2021-01-07 RX ADMIN — DIVALPROEX SODIUM 125 MG: 125 CAPSULE ORAL at 21:16

## 2021-01-07 RX ADMIN — CYCLOBENZAPRINE 10 MG: 10 TABLET, FILM COATED ORAL at 13:57

## 2021-01-07 RX ADMIN — DIVALPROEX SODIUM 125 MG: 125 CAPSULE ORAL at 13:57

## 2021-01-07 RX ADMIN — OXYCODONE HYDROCHLORIDE 10 MG: 10 TABLET ORAL at 13:57

## 2021-01-07 RX ADMIN — GABAPENTIN 200 MG: 100 CAPSULE ORAL at 17:05

## 2021-01-07 RX ADMIN — GABAPENTIN 200 MG: 100 CAPSULE ORAL at 06:30

## 2021-01-07 RX ADMIN — HYDROXYZINE HYDROCHLORIDE 25 MG: 50 TABLET, FILM COATED ORAL at 21:17

## 2021-01-07 RX ADMIN — RISPERIDONE 0.5 MG: 0.5 TABLET ORAL at 06:30

## 2021-01-07 RX ADMIN — LIDOCAINE 1 PATCH: 50 PATCH CUTANEOUS at 12:16

## 2021-01-07 RX ADMIN — DIVALPROEX SODIUM 125 MG: 125 CAPSULE ORAL at 06:30

## 2021-01-07 RX ADMIN — RISPERIDONE 1 MG: 1 TABLET ORAL at 17:05

## 2021-01-07 RX ADMIN — OXYCODONE HYDROCHLORIDE 10 MG: 10 TABLET ORAL at 21:17

## 2021-01-07 RX ADMIN — GABAPENTIN 200 MG: 100 CAPSULE ORAL at 12:15

## 2021-01-07 RX ADMIN — CYCLOBENZAPRINE 10 MG: 10 TABLET, FILM COATED ORAL at 21:17

## 2021-01-07 RX ADMIN — AMLODIPINE BESYLATE 5 MG: 5 TABLET ORAL at 06:30

## 2021-01-07 RX ADMIN — ENOXAPARIN SODIUM 40 MG: 40 INJECTION SUBCUTANEOUS at 06:30

## 2021-01-07 ASSESSMENT — ENCOUNTER SYMPTOMS
VOMITING: 0
CONSTIPATION: 0
INSOMNIA: 0
ABDOMINAL PAIN: 0
DEPRESSION: 0
SPEECH CHANGE: 0
NERVOUS/ANXIOUS: 0
DIARRHEA: 0
COUGH: 0
MYALGIAS: 1
FOCAL WEAKNESS: 0
HEADACHES: 0
SENSORY CHANGE: 0
WEAKNESS: 0
DIZZINESS: 0
SHORTNESS OF BREATH: 0
NAUSEA: 0
PALPITATIONS: 0
FEVER: 0

## 2021-01-07 ASSESSMENT — PAIN DESCRIPTION - PAIN TYPE
TYPE: ACUTE PAIN;SURGICAL PAIN

## 2021-01-07 NOTE — CARE PLAN
Problem: Infection  Goal: Will remain free from infection  Outcome: PROGRESSING AS EXPECTED   Hand hygiene advocated. Educated on measures on how to prevent infection.  Wound vac Dressing to Left Leg kept CDI.     Problem: Discharge Barriers/Planning  Goal: Patient's continuum of care needs will be met  Outcome: PROGRESSING SLOWER THAN EXPECTED   Pending guardianship. Court date scheduled 1/29/2021 @ 1000Am.    Problem: Pain Management  Goal: Pain level will decrease to patient's comfort goal  Outcome: PROGRESSING AS EXPECTED   Oxy IR PO Q6 PRN for pain.

## 2021-01-07 NOTE — PROGRESS NOTES
Problem: Safety  Goal: Will remain free from falls  Outcome: PROGRESSING AS EXPECTED   Safety precautions in place.  Call light and personal items within reach. Bed at lowest position and locked.  Siderails up X 2.  Clutter free environment & adequate lighting. Educated on level of risk and reminded to call for assistance.  Hourly rounding in effect.     Problem: Knowledge Deficit  Goal: Knowledge of disease process/condition, treatment plan, diagnostic tests, and medications will improve  Outcome: PROGRESSING AS EXPECTED   Discussed plan of care.  Questions answered.  Verbalized understanding.

## 2021-01-08 PROCEDURE — 700102 HCHG RX REV CODE 250 W/ 637 OVERRIDE(OP): Performed by: NURSE PRACTITIONER

## 2021-01-08 PROCEDURE — A9270 NON-COVERED ITEM OR SERVICE: HCPCS | Performed by: NURSE PRACTITIONER

## 2021-01-08 PROCEDURE — 700101 HCHG RX REV CODE 250: Performed by: NURSE PRACTITIONER

## 2021-01-08 PROCEDURE — 700111 HCHG RX REV CODE 636 W/ 250 OVERRIDE (IP): Performed by: NURSE PRACTITIONER

## 2021-01-08 PROCEDURE — 770001 HCHG ROOM/CARE - MED/SURG/GYN PRIV*

## 2021-01-08 PROCEDURE — 97607 NEG PRS WND THR NDME<=50SQCM: CPT

## 2021-01-08 RX ADMIN — AMLODIPINE BESYLATE 5 MG: 5 TABLET ORAL at 05:16

## 2021-01-08 RX ADMIN — RISPERIDONE 0.5 MG: 0.5 TABLET ORAL at 05:17

## 2021-01-08 RX ADMIN — DIVALPROEX SODIUM 125 MG: 125 CAPSULE ORAL at 21:57

## 2021-01-08 RX ADMIN — ENOXAPARIN SODIUM 40 MG: 40 INJECTION SUBCUTANEOUS at 05:16

## 2021-01-08 RX ADMIN — RISPERIDONE 1 MG: 1 TABLET ORAL at 17:05

## 2021-01-08 RX ADMIN — HYDROXYZINE HYDROCHLORIDE 25 MG: 50 TABLET, FILM COATED ORAL at 05:16

## 2021-01-08 RX ADMIN — GABAPENTIN 200 MG: 100 CAPSULE ORAL at 05:16

## 2021-01-08 RX ADMIN — LIDOCAINE 1 PATCH: 50 PATCH CUTANEOUS at 12:46

## 2021-01-08 RX ADMIN — GABAPENTIN 200 MG: 100 CAPSULE ORAL at 12:46

## 2021-01-08 RX ADMIN — DIVALPROEX SODIUM 125 MG: 125 CAPSULE ORAL at 13:31

## 2021-01-08 RX ADMIN — GABAPENTIN 200 MG: 100 CAPSULE ORAL at 17:05

## 2021-01-08 RX ADMIN — OXYCODONE HYDROCHLORIDE 10 MG: 10 TABLET ORAL at 12:46

## 2021-01-08 RX ADMIN — DIVALPROEX SODIUM 125 MG: 125 CAPSULE ORAL at 05:16

## 2021-01-08 RX ADMIN — OXYCODONE HYDROCHLORIDE 10 MG: 10 TABLET ORAL at 21:57

## 2021-01-08 RX ADMIN — OXYCODONE HYDROCHLORIDE 10 MG: 10 TABLET ORAL at 05:16

## 2021-01-08 RX ADMIN — CYCLOBENZAPRINE 10 MG: 10 TABLET, FILM COATED ORAL at 05:16

## 2021-01-08 ASSESSMENT — PAIN DESCRIPTION - PAIN TYPE
TYPE: ACUTE PAIN;SURGICAL PAIN
TYPE: ACUTE PAIN
TYPE: ACUTE PAIN;SURGICAL PAIN
TYPE: ACUTE PAIN

## 2021-01-08 NOTE — WOUND TEAM
[facility]  Inpatient Services  Wound and Skin Care Progress Note    Admission Date: 8/7/2020     Last order of IP CONSULT TO WOUND CARE was found on 9/1/2020 from Hospital Encounter on 8/7/2020     HPI, PMH, SH: Reviewed    Unit where seen by Wound Team: [id number]    WOUND CONSULT/FOLLOW UP RELATED TO:  Left leg follow-up; scheduled npwt dressing change.    Self Report / Pain Level: Pt c/o mild pain with VAC removal but states tolerable.    OBJECTIVE: NPWT dressing and 2 layer compression wrap in place    WOUND TYPE, LOCATION, CHARACTERISTICS (Pressure Injuries: location, stage, POA or date identified)     Negative Pressure Wound Therapy 11/20/20 Leg Lateral;Posterior;Medial Left (Active)   NPWT Pump Mode / Pressure Setting Ulta;Continuous;125 mmHg 01/08/21 1000   Dressing Type Medium;Black Foam (Regular) 01/08/21 1000   Number of Foam Pieces Used 3 01/06/21 1300   Canister Changed Yes 01/08/21 1000   Output (mL) 200 mL 01/08/21 1000   NEXT Dressing Change/Treatment Date 01/11/21 01/08/21 1000   WOUND NURSE ONLY - Time Spent with Patient (mins) 60 01/06/21 1300           Wound 11/19/20 Full Thickness Wound Leg Lateral;Posterior;Medial Left (Active)   Wound Image    01/04/21 1258   Site Assessment Pink;Red;Yellow 01/08/21 1000   Periwound Assessment Scar tissue 01/08/21 1000   Margins Defined edges;Attached edges 01/08/21 1000   Closure Secondary intention 01/08/21 1000   Drainage Amount Moderate 01/08/21 1000   Drainage Description Serosanguineous 01/08/21 1000   Treatments Cleansed;Site care 01/08/21 1000   Wound Cleansing Normal Saline Irrigation 01/08/21 1000   Periwound Protectant Skin Protectant Wipes to Periwound;Drape 01/08/21 1000   Dressing Cleansing/Solutions Not Applicable 01/06/21 1300   Dressing Options Wound Vac;Compression Wrap Two Layer 01/08/21 1000   Dressing Changed Observed 01/07/21 0935   Dressing Status Clean;Dry;Intact 01/08/21 1000   Dressing Change/Treatment Frequency By Wound  Team Only 21 1000   NEXT Dressing Change/Treatment Date 21 1000   NEXT Weekly Photo (Inpatient Only) 21 1000   Non-staged Wound Description Full thickness 21 1000   Wound Bed Granulation (%) 85 % 21 1000   Wound Bed Slough (%) 15 % 21 1000   Wound Bed Granulation (%) - Post-Procedure 100 % 20 1300   Shape Irregular 21 1300   Wound Odor Mild 21 1000   Pulses Left;2+;DP;PT 21 1300   Exposed Structures None 21 1000   WOUND NURSE ONLY - Time Spent with Patient (mins) 60 21 1300       Wounds are no longer connected - measured as 2 wounds - posterior thigh and medial leg.        VASCULAR    CESAR:   CESAR Results, Last 30 Days Us-cesar Single Level Bilat    Result Date: 2020  Narrative  Vascular Laboratory  Conclusions  1.  Normal bilateral CESAR's.  GRUPO GANN  Age:    64    Gender:     M  MRN:    [id number]  :    1955      BSA:  Exam Date:     2020 09:59  Room #:     Inpatient  Priority:     Routine  Ht (in):             Wt (lb):  Ordering Physician:        EDDA CANADA  Referring Physician:       [id number] Morrissey  Sonographer:               Deja Ellis RDMS                             T  Study Type:                Complete Bilateral  Technical Quality:         Adequate  Indications:     Ulceration of LE  CPT Codes:       03644  ICD Codes:       707.1  History:         Nonhealing wound of left lower extremity.  Limitations:                 RIGHT  Waveform            Systolic BPs (mmHg)                             117           Brachial  Triphasic                                Common Femoral  Triphasic                  138           Posterior Tibial  Triphasic                  132           Dorsalis Pedis                                           Peroneal                             1.18          CESAR                                            TBI                       LEFT  Waveform        Systolic BPs (mmHg)                             114           Brachial  Triphasic                                Common Femoral  Triphasic                  127           Posterior Tibial  Triphasic                  122           Dorsalis Pedis                                           Peroneal                             1.09          KOSTAS                                           TBI  Findings  Right.  Doppler waveforms of the common femoral artery are of high amplitude and  triphasic.  Doppler waveforms at the ankle are brisk and triphasic.  Ankle-brachial index is normal.  Left.  Doppler waveforms of the common femoral artery are of high amplitude and  triphasic.  Doppler waveforms at the ankle are brisk and triphasic.  Ankle-brachial index is normal.  Unable to obtained popliteal waveforms due to wound bandages.  Additional testing was not performed in accordance with lower extremity  arterial evaluation protocol.  Clover Maya  (Electronically Signed)  Final Date:      02 September 2020                   11:14    Lab Values:    Lab Results   Component Value Date/Time    WBC 6.5 11/22/2020 02:44 AM    RBC 3.54 (L) 11/22/2020 02:44 AM    HEMOGLOBIN 9.9 (L) 11/22/2020 02:44 AM    HEMATOCRIT 30.5 (L) 11/22/2020 02:44 AM    CREACTPROT 1.07 (H) 08/12/2020 01:55 PM    SEDRATEWES 85 (H) 08/07/2020 01:20 PM    HBA1C 5.7 (H) 11/09/2018 06:30 PM        Culture Results show:  Recent Results (from the past 720 hour(s))   CULTURE WOUND W/ GRAM STAIN    Collection Time: 09/01/20  2:00 PM    Specimen: Left Leg; Wound   Result Value Ref Range    Significant Indicator POS (POS)     Source WND     Site LEFT LEG     Culture Result - (A)     Gram Stain Result       Moderate Gram positive cocci.  Moderate Gram positive rods.      Culture Result (A)      Beta Hemolytic Streptococcus group A  Moderate growth      Culture Result (A)      Methicillin Resistant Staphylococcus  aureus  Moderate growth      Culture Result (A)      Diphtheroids  Moderate growth  Mixed morphologies.         INTERVENTIONS BY WOUND TEAM:    Dressings removed cleansed wound bed with normal saline. Meredith-wound skin protected with benzoin and dermatac drape. Nystatin and silver powder to wound bed, black foam to all of wound bed, secured with regular drape. TRAC pad placed. NPWT resumed, no leaks noted. 2 layer coflex compression to left LE.      Interdisciplinary consultation: Patient, Bedside RN    EVALUATION / RATIONALE FOR TREATMENT:    01/04/2021: 2 layer compression wrap re-applied to left LE due to ACE wrap leaving too much indentation to left LE.   12/31/2020 thigh wound much narrower.  Biofilm redeveloping and odor still present.   12/27/20: Wound bed granular and odor has lessen but still persists.   12/18/20 wound length decreased.  Odor persists.    12/14/2020: re-cultured wound bed.  12/11/2020 POD 23 Length of wounds has decreased, width has not changed.  Odor persists.  Pt continues to become angry with VAC when it limits his mobility.    12/7/2020 POD 19 odor persists, improved wound bed after debridement.  Possible bacterial vs yeast vs fungal infection.  Culture taken.  Nystatin to treat possible fungal infection, silver foam to decrease microbial population.    12/4/2020 POD# 16 odor worsening, more yellow tissue present, will add nystatin and silver foam next week and consider a culture  11/30 POD#11 granular buds visible to wound bed.  Same as prior.   11/27 POD#8 wound is odorous likely related to biologic dressing.  No overt signs of infection.  Granular buds are visible through adaptic.  Edges do not appear as thick as they were prior to last sx.  Continue with current treatment.    11/23: POD#4 s/p I&D of left LE wounds and biologic placed by Dr. Olvera on 11/19.  Wound bed is flatter and raised edges scar tissue seems to be gone.   11/14 Posterior thigh aspect of wound deteriorating, will  increase frequency of treatment to keep biofilm down and hopefully avoid systemic abx.  Will include thigh in compression wrap.  Will consider culture if improvement is not seen in the next few treatments.    11/10: APRN unavailable at this time.  Will re-schedule excisional debridement to next week.   11/5: Plan for debridement of scarred edges on Tues by LPS. Tendon exposed to posterior aspect of wound, behind knee. Continue with current dressing care.  10/29: Excisional debridement by Asaf ELLER of scar tissue along the proximal edge of the posterior knee and lateral thigh.  Lateral aspect of thigh is flatten.  Medial aspect of knee has thick scar tissue that was excisionally debrided by Asaf ELLER.  Fully granulated throughout the wound bed.   10/23 wound bed mostly granular, superficial in depth, progress has been slow with the wound VAC so will trial collagen product to encourage new tissue growth.    10/19: wound bed has improved in color and is fully granulated.  Addison-wound has improved, denudation has resolved. APRN continuing with serial weekly debridements as needed.   10/16: wound friable pt now on iv abx per ID.  Indurated edges addressed with drape and foam.  Addison wound likely has yeast infection - addressing with silver powder crusting  10/14: patient seen with Asaf ELLER, stopping veraflo instillation.  Starting silver powder and regular wound VAC to see if it will help with bioburden.  Possible colonization of bacteria.  ID involved.   10/12: Inflammation noted to the proximal part of the wound bed.  Will continue to monitor, possible vac break on Wednesday to help addison-skin inflammation.   10/7: Excisional debridement performed by Asaf ELLER. Will need to continue selective debridement with NPWT changes, and weekly excisional debridement with APRN to flatten out the scar tissue.  IV abx therapy ended 10/5.   10/5: Lateral wound still with some slough, both wounds  smaller per measurements. Medial wound with all granular tissue.  9/30: Patient to start abx therapy for 7 days course per Dr. Ellis.  Started dakin's instillation to veraflo  9/28: malodorous today, culture taken.    9/25: Odor noted, though unclear if from wound or pt, consider shower before next Vac change. Consider regular vac next change, wound granular and superficial.   9/23: Patient still awaiting guardianship for placement.   9/18: wound granulating nicely and responding well to current treatment.    9/16: Wound bed with granular tissue, edges are thick but appear better than previous assessments. . NPWT VF to encourage closure by secondary intention and manage drainage while encouraging oxygenation and granulation to the wound bed. 2 layer compression to assist in decrease of swelling and encourage blood flow to wounds. Wound team to follow up and change 3x/week.      Goals: Steady decrease in wound area and depth weekly.    NURSING PLAN OF CARE ORDERS (X):    Dressing changes: See Dressing Care orders: X  Skin care: See Skin Care orders:   Rectal tube care: See Rectal Tube Care orders:   Other orders:      WOUND TEAM PLAN OF CARE:   Dressing changes by wound team:        Follow up 3 times weekly:                NPWT change 3 times weekly:  X,  NPWT changes 3x/ weekly with ace wrap.  Follow up 1-2 times weekly:      Follow up Bi-Monthly:                   Follow up as needed:       Other (explain):     Anticipated discharge plans:  LTACH:        SNF/Rehab: X - patient will need ongoing wound care for LLE upon discharge - 3x/weekly           Home Health Care:           Outpatient Wound Center:            Self Care:

## 2021-01-08 NOTE — PROGRESS NOTES
[facility] Medicine Twice Weekly Progress Note    Date of Service  1/7/2021    Chief Complaint  LLE wound    Hospital Course  Mr. Varela is a 65-year-old male who presented to the emergency department with a left lower extremity wound infection. He was hospitalized at our facility in April and evaluated by orthopedic surgery who recommended wound care. Wound cultures at that time grew MSSA and Streptococcus. He had been seen at [facility] earlier that week and prescribed antibiotics, however he had not filled the prescription.  An ultrasound of that extremity was done and was negative for DVT.  He was treated for sepsis and completed an antibiotic course on 9/16/2020. He was also found to have head lice and underwent treatment for that.  A repeat wound culture was done on 9/28/2020 and grew Strep A and Proteus. Infectious disease was consulted and recommended a 5-day course of IV zosyn which was completed on 10/5/2020. On 10/12/2020 the wound care team noticed increased inflammation to the proximal part of the wound bed and switched from a vera flow wound VAC to a regular wound VAC.  ID was again consulted and on 10/14/2020 recommended to 7-day course of antibiotics with meropenem which was completed on 10/22/2020. Additionally, he was noted to have intermittent agitation so was started on risperdal, depakote, and gabapentin per psychiatric recommendations.  On 11/20/2020 he underwent left lower extremity debridement with wound VAC placement. Since then he has remained stable.  He has been deemed incapacitated to make medical decisions and is currently pending guardianship (which he is contesting) and placement, likely to a group home. His court date is now scheduled for 1/29/2021 at 10 a.m.      Interval Problem Update  Patient ambulatory in his room, pleasant and cooperative.  He is oriented only to himself.  He states pain is well managed on his current regimen.  -Guardianship court has requested a repeat  cognitive evaluation prior to next hearing on 1/29.  Patient is contesting guardianship.  SLP evaluation pending.    Consultants/Specialty  LPS  ID  Psychiatry    Code Status  Full Code    Disposition  Contesting guardianship, hearing is 1/29/2021.     Review of Systems  Review of Systems   Constitutional: Negative for fever and malaise/fatigue.   Respiratory: Negative for cough and shortness of breath.    Cardiovascular: Negative for chest pain, palpitations and leg swelling.   Gastrointestinal: Negative for abdominal pain, constipation, diarrhea, nausea and vomiting.   Genitourinary: Negative for dysuria.   Musculoskeletal: Positive for myalgias (Lower left extremity pain).   Skin: Negative for itching and rash.   Neurological: Negative for dizziness, sensory change, speech change, focal weakness, weakness and headaches.   Psychiatric/Behavioral: Negative for depression. The patient is not nervous/anxious and does not have insomnia.    All other systems reviewed and are negative.     Physical Exam  Temp:  [36.1 °C (96.9 °F)-36.6 °C (97.8 °F)] 36.3 °C (97.3 °F)  Pulse:  [70-93] 75  Resp:  [16] 16  BP: (110-132)/(79-87) 130/80  SpO2:  [94 %-97 %] 97 %    Physical Exam  Vitals signs and nursing note reviewed.   Constitutional:       General: He is sleeping. He is not in acute distress.     Appearance: Normal appearance. He is well-developed. He is not ill-appearing.   HENT:      Head: Normocephalic and atraumatic.      Mouth/Throat:      Lips: Pink.      Mouth: Mucous membranes are moist.   Eyes:      Pupils: Pupils are equal, round, and reactive to light.   Neck:      Musculoskeletal: Normal range of motion and neck supple.   Cardiovascular:      Rate and Rhythm: Normal rate and regular rhythm.      Pulses: Normal pulses.      Heart sounds: Normal heart sounds.   Pulmonary:      Effort: Pulmonary effort is normal.      Breath sounds: Normal breath sounds.   Abdominal:      General: Abdomen is flat. Bowel sounds are  normal. There is no abdominal bruit.      Palpations: Abdomen is soft.      Tenderness: There is no abdominal tenderness.   Musculoskeletal:      Right lower leg: No edema.      Left lower leg: No edema.   Skin:     General: Skin is warm and dry.      Comments: LLE wound, see LPS documentation for details.  Wound vac now in place.   Neurological:      General: No focal deficit present.      Mental Status: He is oriented to person, place, and time and easily aroused.      GCS: GCS eye subscore is 4. GCS verbal subscore is 5. GCS motor subscore is 6.   Psychiatric:         Attention and Perception: Attention and perception normal.         Mood and Affect: Mood and affect normal.         Speech: Speech normal.         Behavior: Behavior normal. Behavior is cooperative.         Thought Content: Thought content normal.         Cognition and Memory: Cognition normal. He exhibits impaired recent memory.         Judgment: Judgment normal.     Fluids    Intake/Output Summary (Last 24 hours) at 1/7/2021 1931  Last data filed at 1/7/2021 1836  Gross per 24 hour   Intake 1210 ml   Output --   Net 1210 ml     Laboratory    Imaging  IR-US GUIDED PIV   Final Result    Ultrasound-guided PERIPHERAL IV INSERTION performed by    qualified nursing staff as above.      IR-MIDLINE CATHETER INSERTION WO GUIDANCE > AGE 3   Final Result                  Ultrasound-guided midline placement performed by qualified nursing staff    as above.          IR-US GUIDED PIV   Final Result    Ultrasound-guided PERIPHERAL IV INSERTION performed by    qualified nursing staff as above.      IR-MIDLINE CATHETER INSERTION WO GUIDANCE > AGE 3   Final Result                  Ultrasound-guided midline placement performed by qualified nursing staff    as above.          US-KOSTAS SINGLE LEVEL BILAT   Final Result      CT-HEAD W/O   Final Result      No acute intracranial abnormality      DX-TIBIA AND FIBULA LEFT   Final Result      1.  Healed distal metaphyseal  fractures of the left fibula and tibia with tibial IM layla in place.      2.  No acute fracture or dislocation.      3.  No radiopaque soft tissue foreign body.      DX-FEMUR-2+ LEFT   Final Result      1.  No radiographic evidence of acute traumatic injury left femur.      2.  Unchanged cortical thickening in the posterior aspect of the distal femoral diaphysis which may represent sequela of prior injury or infection.      3.  No soft tissue foreign body identified.      US-EXTREMITY VENOUS LOWER UNILAT LEFT   Final Result      DX-CHEST-PORTABLE (1 VIEW)   Final Result      No acute cardiopulmonary abnormality.         Assessment/Plan  * Wound of left leg- (present on admission)  Assessment & Plan  -Wound vac back in place.   -Further management per wound care/LPS/ortho.   -Pain control as needed.     Encephalopathy- (present on admission)  Assessment & Plan  -Acute on chronic, likely due Wernicke's. Improved.   -Per psychiatry and Dr. Velázquez: Patient is incapacitated.  -Guardianship and placement pending.  Patient is contesting guardianship, hearing is now scheduled for 1/29/2021 at 10 AM.  -Repeat cognitive evaluation pending    Psychiatric disorder- (present on admission)  Assessment & Plan  -Unspecified.  -Continue Risperdal and Depakote.  -Psychiatry has consulted and deemed him incapacitated to leave AMA or make medical decisions.  -Pending guardianship, which he is contesting.  -Last cognitive evaluation 8/20.  Court requesting repeat cognitive evaluation prior to hearing at the end of January.  Patient's medical condition has significantly improved since prior cognitive evaluation.    Essential hypertension- (present on admission)  Assessment & Plan  -Well controlled on amlodipine.    Flexion contracture of knee, 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)  Assessment & Plan  -Secondary to chronic wound in that area.  -PT/OT.  -Does not seem to limit his mobility.  Is frequently ambulatory.    Infection of wound due to  methicillin resistant Staphylococcus aureus (MRSA)- (present on admission)  Assessment & Plan  -History of MRSA.  -Poor compliance with OP wound care. Poor compliance with wound vac at times.   -Continue pain control as needed.  -LPS and wound care following - debridements and wound VAC changes per their recommendations  -Cultures repeated 12/14 - positive for Proteus species, pan sensitive.  Completed regimen of augmentin.     Emphysema/COPD (HCC)- (present on admission)  Assessment & Plan  -Chronic and stable off medication, without acute exacerbation.    Alcohol dependence (HCC)- (present on admission)  Assessment & Plan  -History of. Abstinent since admission.    Protein malnutrition (HCC)- (present on admission)  Assessment & Plan  -Body mass index is 21.35 kg/m².   -Continue TID supplements.  -Encourage PO intake.    Tobacco dependence- (present on admission)  Assessment & Plan  -Abstinent since admission.     VTE prophylaxis: Lovenox      FELIPE Hull    Nurse Practitioner, [facility]  [phone]

## 2021-01-09 PROCEDURE — A9270 NON-COVERED ITEM OR SERVICE: HCPCS | Performed by: NURSE PRACTITIONER

## 2021-01-09 PROCEDURE — 700102 HCHG RX REV CODE 250 W/ 637 OVERRIDE(OP): Performed by: NURSE PRACTITIONER

## 2021-01-09 PROCEDURE — 770001 HCHG ROOM/CARE - MED/SURG/GYN PRIV*

## 2021-01-09 PROCEDURE — 700111 HCHG RX REV CODE 636 W/ 250 OVERRIDE (IP): Performed by: NURSE PRACTITIONER

## 2021-01-09 RX ADMIN — GABAPENTIN 200 MG: 100 CAPSULE ORAL at 17:50

## 2021-01-09 RX ADMIN — RISPERIDONE 0.5 MG: 0.5 TABLET ORAL at 05:28

## 2021-01-09 RX ADMIN — RISPERIDONE 1 MG: 1 TABLET ORAL at 17:50

## 2021-01-09 RX ADMIN — OXYCODONE HYDROCHLORIDE 10 MG: 10 TABLET ORAL at 23:59

## 2021-01-09 RX ADMIN — CYCLOBENZAPRINE 10 MG: 10 TABLET, FILM COATED ORAL at 19:42

## 2021-01-09 RX ADMIN — GABAPENTIN 200 MG: 100 CAPSULE ORAL at 05:28

## 2021-01-09 RX ADMIN — HYDROXYZINE HYDROCHLORIDE 25 MG: 50 TABLET, FILM COATED ORAL at 07:06

## 2021-01-09 RX ADMIN — HYDROXYZINE HYDROCHLORIDE 25 MG: 50 TABLET, FILM COATED ORAL at 18:21

## 2021-01-09 RX ADMIN — AMLODIPINE BESYLATE 5 MG: 5 TABLET ORAL at 05:28

## 2021-01-09 RX ADMIN — DIVALPROEX SODIUM 125 MG: 125 CAPSULE ORAL at 21:13

## 2021-01-09 RX ADMIN — ENOXAPARIN SODIUM 40 MG: 40 INJECTION SUBCUTANEOUS at 05:28

## 2021-01-09 RX ADMIN — OXYCODONE HYDROCHLORIDE 10 MG: 10 TABLET ORAL at 07:06

## 2021-01-09 RX ADMIN — DIVALPROEX SODIUM 125 MG: 125 CAPSULE ORAL at 05:28

## 2021-01-09 RX ADMIN — OXYCODONE HYDROCHLORIDE 10 MG: 10 TABLET ORAL at 17:50

## 2021-01-09 ASSESSMENT — PAIN DESCRIPTION - PAIN TYPE
TYPE: ACUTE PAIN

## 2021-01-10 PROCEDURE — 99231 SBSQ HOSP IP/OBS SF/LOW 25: CPT | Performed by: NURSE PRACTITIONER

## 2021-01-10 PROCEDURE — 700102 HCHG RX REV CODE 250 W/ 637 OVERRIDE(OP): Performed by: NURSE PRACTITIONER

## 2021-01-10 PROCEDURE — A9270 NON-COVERED ITEM OR SERVICE: HCPCS | Performed by: NURSE PRACTITIONER

## 2021-01-10 PROCEDURE — 700111 HCHG RX REV CODE 636 W/ 250 OVERRIDE (IP): Performed by: NURSE PRACTITIONER

## 2021-01-10 PROCEDURE — 770001 HCHG ROOM/CARE - MED/SURG/GYN PRIV*

## 2021-01-10 PROCEDURE — 97606 NEG PRS WND THER DME>50 SQCM: CPT

## 2021-01-10 RX ADMIN — ENOXAPARIN SODIUM 40 MG: 40 INJECTION SUBCUTANEOUS at 06:01

## 2021-01-10 RX ADMIN — GABAPENTIN 200 MG: 100 CAPSULE ORAL at 06:01

## 2021-01-10 RX ADMIN — DIVALPROEX SODIUM 125 MG: 125 CAPSULE ORAL at 06:01

## 2021-01-10 RX ADMIN — HYDROXYZINE HYDROCHLORIDE 25 MG: 50 TABLET, FILM COATED ORAL at 20:39

## 2021-01-10 RX ADMIN — GABAPENTIN 200 MG: 100 CAPSULE ORAL at 13:19

## 2021-01-10 RX ADMIN — DIVALPROEX SODIUM 125 MG: 125 CAPSULE ORAL at 20:39

## 2021-01-10 RX ADMIN — CYCLOBENZAPRINE 10 MG: 10 TABLET, FILM COATED ORAL at 16:50

## 2021-01-10 RX ADMIN — DIVALPROEX SODIUM 125 MG: 125 CAPSULE ORAL at 13:19

## 2021-01-10 RX ADMIN — OXYCODONE HYDROCHLORIDE 10 MG: 10 TABLET ORAL at 16:50

## 2021-01-10 RX ADMIN — AMLODIPINE BESYLATE 5 MG: 5 TABLET ORAL at 06:01

## 2021-01-10 RX ADMIN — RISPERIDONE 1 MG: 1 TABLET ORAL at 16:50

## 2021-01-10 RX ADMIN — RISPERIDONE 0.5 MG: 0.5 TABLET ORAL at 06:01

## 2021-01-10 RX ADMIN — OXYCODONE HYDROCHLORIDE 10 MG: 10 TABLET ORAL at 06:02

## 2021-01-10 ASSESSMENT — ENCOUNTER SYMPTOMS
ABDOMINAL PAIN: 0
FEVER: 0
PALPITATIONS: 0
COUGH: 0
DEPRESSION: 0
DIARRHEA: 0
SENSORY CHANGE: 0
NERVOUS/ANXIOUS: 0
DIZZINESS: 0
SPEECH CHANGE: 0
FOCAL WEAKNESS: 0
VOMITING: 0
MYALGIAS: 1
CONSTIPATION: 0
HEADACHES: 0
WEAKNESS: 0
INSOMNIA: 0
NAUSEA: 0
SHORTNESS OF BREATH: 0

## 2021-01-10 ASSESSMENT — PAIN DESCRIPTION - PAIN TYPE
TYPE: ACUTE PAIN

## 2021-01-10 NOTE — PROGRESS NOTES
[facility] Medicine Twice Weekly Progress Note    Date of Service  1/10/2021    Chief Complaint  LLE wound    Hospital Course  Mr. Varela is a 65-year-old male who presented to the emergency department with a left lower extremity wound infection. He was hospitalized at our facility in April and evaluated by orthopedic surgery who recommended wound care. Wound cultures at that time grew MSSA and Streptococcus. He had been seen at [facility] earlier that week and prescribed antibiotics, however he had not filled the prescription.  An ultrasound of that extremity was done and was negative for DVT.  He was treated for sepsis and completed an antibiotic course on 9/16/2020. He was also found to have head lice and underwent treatment for that.  A repeat wound culture was done on 9/28/2020 and grew Strep A and Proteus. Infectious disease was consulted and recommended a 5-day course of IV zosyn which was completed on 10/5/2020. On 10/12/2020 the wound care team noticed increased inflammation to the proximal part of the wound bed and switched from a vera flow wound VAC to a regular wound VAC.  ID was again consulted and on 10/14/2020 recommended to 7-day course of antibiotics with meropenem which was completed on 10/22/2020. Additionally, he was noted to have intermittent agitation so was started on risperdal, depakote, and gabapentin per psychiatric recommendations.  On 11/20/2020 he underwent left lower extremity debridement with wound VAC placement. Since then he has remained stable.  He has been deemed incapacitated to make medical decisions and is currently pending guardianship (which he is contesting) and placement, likely to a group home. His court date is now scheduled for 1/29/2021 at 10 a.m.      Interval Problem Update  Patient is easily aroused, pleasant and cooperative.  He is oriented only to himself.  He states pain is well managed on his current regimen.  -Guardianship court has requested a repeat  cognitive evaluation prior to next hearing on 1/29.  Patient is contesting guardianship.  SLP evaluation pending.  -no changes to plan of care    Consultants/Specialty  LPS  ID  Psychiatry    Code Status  Full Code    Disposition  Contesting guardianship, hearing is 1/29/2021.     Review of Systems  Review of Systems   Constitutional: Negative for fever and malaise/fatigue.   Respiratory: Negative for cough and shortness of breath.    Cardiovascular: Negative for chest pain, palpitations and leg swelling.   Gastrointestinal: Negative for abdominal pain, constipation, diarrhea, nausea and vomiting.   Genitourinary: Negative for dysuria.   Musculoskeletal: Positive for myalgias (Lower left extremity pain).   Skin: Negative for itching and rash.   Neurological: Negative for dizziness, sensory change, speech change, focal weakness, weakness and headaches.   Psychiatric/Behavioral: Negative for depression. The patient is not nervous/anxious and does not have insomnia.    All other systems reviewed and are negative.     Physical Exam  Temp:  [36.7 °C (98.1 °F)] 36.7 °C (98.1 °F)  Pulse:  [78-82] 78  Resp:  [16-17] 16  BP: (109-151)/(71-87) 109/71  SpO2:  [96 %-98 %] 98 %    Physical Exam  Vitals signs and nursing note reviewed.   Constitutional:       General: He is sleeping. He is not in acute distress.     Appearance: Normal appearance. He is well-developed. He is not ill-appearing.   HENT:      Head: Normocephalic and atraumatic.      Mouth/Throat:      Lips: Pink.      Mouth: Mucous membranes are moist.   Eyes:      Pupils: Pupils are equal, round, and reactive to light.   Neck:      Musculoskeletal: Normal range of motion and neck supple.   Cardiovascular:      Rate and Rhythm: Normal rate and regular rhythm.      Pulses: Normal pulses.      Heart sounds: Normal heart sounds.   Pulmonary:      Effort: Pulmonary effort is normal.      Breath sounds: Normal breath sounds.   Abdominal:      General: Abdomen is flat.  Bowel sounds are normal. There is no abdominal bruit.      Palpations: Abdomen is soft.      Tenderness: There is no abdominal tenderness.   Musculoskeletal:      Right lower leg: No edema.      Left lower leg: No edema.   Skin:     General: Skin is warm and dry.      Comments: LLE wound, see LPS documentation for details.  Wound vac now in place.   Neurological:      General: No focal deficit present.      Mental Status: He is oriented to person, place, and time and easily aroused.      GCS: GCS eye subscore is 4. GCS verbal subscore is 5. GCS motor subscore is 6.   Psychiatric:         Attention and Perception: Attention and perception normal.         Mood and Affect: Mood and affect normal.         Speech: Speech normal.         Behavior: Behavior normal. Behavior is cooperative.         Thought Content: Thought content normal.         Cognition and Memory: Cognition normal. He exhibits impaired recent memory.         Judgment: Judgment normal.     Fluids    Intake/Output Summary (Last 24 hours) at 1/10/2021 1540  Last data filed at 1/10/2021 0600  Gross per 24 hour   Intake 500 ml   Output --   Net 500 ml     Laboratory    Imaging  IR-US GUIDED PIV   Final Result    Ultrasound-guided PERIPHERAL IV INSERTION performed by    qualified nursing staff as above.      IR-MIDLINE CATHETER INSERTION WO GUIDANCE > AGE 3   Final Result                  Ultrasound-guided midline placement performed by qualified nursing staff    as above.          IR-US GUIDED PIV   Final Result    Ultrasound-guided PERIPHERAL IV INSERTION performed by    qualified nursing staff as above.      IR-MIDLINE CATHETER INSERTION WO GUIDANCE > AGE 3   Final Result                  Ultrasound-guided midline placement performed by qualified nursing staff    as above.          US-KOSTAS SINGLE LEVEL BILAT   Final Result      CT-HEAD W/O   Final Result      No acute intracranial abnormality      DX-TIBIA AND FIBULA LEFT   Final Result      1.  Healed  distal metaphyseal fractures of the left fibula and tibia with tibial IM layla in place.      2.  No acute fracture or dislocation.      3.  No radiopaque soft tissue foreign body.      DX-FEMUR-2+ LEFT   Final Result      1.  No radiographic evidence of acute traumatic injury left femur.      2.  Unchanged cortical thickening in the posterior aspect of the distal femoral diaphysis which may represent sequela of prior injury or infection.      3.  No soft tissue foreign body identified.      US-EXTREMITY VENOUS LOWER UNILAT LEFT   Final Result      DX-CHEST-PORTABLE (1 VIEW)   Final Result      No acute cardiopulmonary abnormality.         Assessment/Plan  * Wound of left leg- (present on admission)  Assessment & Plan  -Wound vac back in place.   -Further management per wound care/LPS/ortho.   -Pain control as needed.     Encephalopathy- (present on admission)  Assessment & Plan  -Acute on chronic, likely due Wernicke's. Improved.   -Per psychiatry and Dr. Velázquez: Patient is incapacitated.  -Guardianship and placement pending.  Patient is contesting guardianship, hearing is now scheduled for 1/29/2021 at 10 AM.  -Repeat cognitive evaluation pending    Psychiatric disorder- (present on admission)  Assessment & Plan  -Unspecified.  -Continue Risperdal and Depakote.  -Psychiatry has consulted and deemed him incapacitated to leave AMA or make medical decisions.  -Pending guardianship, which he is contesting.  -Last cognitive evaluation 8/20.  Court requesting repeat cognitive evaluation prior to hearing at the end of January.  Patient's medical condition has significantly improved since prior cognitive evaluation.  -not fully oriented    Essential hypertension- (present on admission)  Assessment & Plan  -Well controlled on amlodipine.    Flexion contracture of knee, 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)  Assessment & Plan  -Secondary to chronic wound in that area.  -PT/OT.  -Does not seem to limit his mobility.  Is frequently  ambulatory.    Infection of wound due to methicillin resistant Staphylococcus aureus (MRSA)- (present on admission)  Assessment & Plan  -History of MRSA.  -Poor compliance with OP wound care. Poor compliance with wound vac at times.   -Continue pain control as needed.  -LPS and wound care following - debridements and wound VAC changes per their recommendations  -Cultures repeated 12/14 - positive for Proteus species, pan sensitive.  Completed regimen of augmentin.     Emphysema/COPD (HCC)- (present on admission)  Assessment & Plan  -Chronic and stable off medication, without acute exacerbation.    Alcohol dependence (HCC)- (present on admission)  Assessment & Plan  -History of. Abstinent since admission.    Protein malnutrition (HCC)- (present on admission)  Assessment & Plan  -Body mass index is 21.35 kg/m².   -Continue TID supplements.  -Encourage PO intake.    Tobacco dependence- (present on admission)  Assessment & Plan  -Abstinent since admission.     VTE prophylaxis: LovenoFELIPE Garcia    Nurse PractitionerThedaCare [facility] - [facility] Inc  [phone]

## 2021-01-10 NOTE — PROGRESS NOTES
Attempted to trouble shoot wound vac dressing, dressing half way off patient notified wound RN and was instructed to apply a wet to dry dressing.

## 2021-01-10 NOTE — WOUND TEAM
[facility]  Inpatient Services  Wound and Skin Care Progress Note    Admission Date: 8/7/2020     Last order of IP CONSULT TO WOUND CARE was found on 9/1/2020 from Hospital Encounter on 8/7/2020     HPI, PMH, SH: Reviewed    Unit where seen by Wound Team: [id number]    WOUND CONSULT/FOLLOW UP RELATED TO:  Left leg follow-up; scheduled npwt dressing change.    Self Report / Pain Level: Pt c/o mild pain with VAC removal but states tolerable.    OBJECTIVE: NPWT dressing and 2 layer compression wrap in place    WOUND TYPE, LOCATION, CHARACTERISTICS (Pressure Injuries: location, stage, POA or date identified)      Negative Pressure Wound Therapy 11/20/20 Leg Lateral;Posterior;Medial Left (Active)   NPWT Pump Mode / Pressure Setting Ulta;Continuous;125 mmHg 01/10/21 1600   Dressing Type Medium;Black Foam (Regular) 01/10/21 1600   Number of Foam Pieces Used 3 01/10/21 1600   Canister Changed No 01/10/21 1600   Output (mL) 200 mL 01/08/21 1000   NEXT Dressing Change/Treatment Date 01/13/21 01/10/21 1600   WOUND NURSE ONLY - Time Spent with Patient (mins) 60 01/10/21 1600           Wound 11/19/20 Full Thickness Wound Leg Lateral;Posterior;Medial Left (Active)   Wound Image    01/04/21 1258   Site Assessment Pink;Red 01/10/21 1600   Periwound Assessment Clean;Dry;Intact 01/10/21 1600   Margins Defined edges 01/10/21 1600   Closure Secondary intention 01/10/21 1600   Drainage Amount Moderate 01/10/21 1600   Drainage Description Serosanguineous 01/10/21 1600   Treatments Cleansed;Irrigation;CSWD - Conservative Sharp Wound Debridement;Site care 01/10/21 1600   Wound Cleansing Approved Wound Cleanser 01/10/21 1600   Periwound Protectant Skin Protectant Wipes to Periwound;Drape 01/10/21 1600   Dressing Cleansing/Solutions Not Applicable 01/10/21 1600   Dressing Options Wound Vac;Compression Wrap Two Layer 01/10/21 1600   Dressing Changed Changed 01/10/21 1600   Dressing Status Clean;Dry;Intact 01/10/21 1600   Dressing  Change/Treatment Frequency By Wound Team Only 01/10/21 1600   NEXT Dressing Change/Treatment Date 01/13/21 01/10/21 1600   NEXT Weekly Photo (Inpatient Only) 01/13/21 01/10/21 1600   Non-staged Wound Description Full thickness 01/10/21 1600   Wound Bed Granulation (%) 85 % 21 1000   Wound Bed Slough (%) 15 % 21 1000   Wound Bed Granulation (%) - Post-Procedure 100 % 20 1300   Shape Irregular 01/10/21 1600   Wound Odor Mild 01/10/21 1600   Pulses Left;2+;DP;PT 01/10/21 1600   Exposed Structures None 01/10/21 1600   WOUND NURSE ONLY - Time Spent with Patient (mins) 60 01/10/21 1600                Wounds are no longer connected - measured as 2 wounds - posterior thigh and medial leg.        VASCULAR    CESAR:   CESAR Results, Last 30 Days Us-cesar Single Level Bilat    Result Date: 2020  Narrative  Vascular Laboratory  Conclusions  1.  Normal bilateral CESAR's.  GRUPO GANN  Age:    64    Gender:     M  MRN:    [id number]  :    1955      BSA:  Exam Date:     2020 09:59  Room #:     Inpatient  Priority:     Routine  Ht (in):             Wt (lb):  Ordering Physician:        EDDA CANADA  Referring Physician:       [id number] Morrissey  Sonographer:               Deja Ellis RDMS                             T  Study Type:                Complete Bilateral  Technical Quality:         Adequate  Indications:     Ulceration of LE  CPT Codes:       95257  ICD Codes:       707.1  History:         Nonhealing wound of left lower extremity.  Limitations:                 RIGHT  Waveform            Systolic BPs (mmHg)                             117           Brachial  Triphasic                                Common Femoral  Triphasic                  138           Posterior Tibial  Triphasic                  132           Dorsalis Pedis                                           Peroneal                             1.18           KOSTAS                                           TBI                       LEFT  Waveform        Systolic BPs (mmHg)                             114           Brachial  Triphasic                                Common Femoral  Triphasic                  127           Posterior Tibial  Triphasic                  122           Dorsalis Pedis                                           Peroneal                             1.09          KOSTAS                                           TBI  Findings  Right.  Doppler waveforms of the common femoral artery are of high amplitude and  triphasic.  Doppler waveforms at the ankle are brisk and triphasic.  Ankle-brachial index is normal.  Left.  Doppler waveforms of the common femoral artery are of high amplitude and  triphasic.  Doppler waveforms at the ankle are brisk and triphasic.  Ankle-brachial index is normal.  Unable to obtained popliteal waveforms due to wound bandages.  Additional testing was not performed in accordance with lower extremity  arterial evaluation protocol.  Clover Maya  (Electronically Signed)  Final Date:      02 September 2020                   11:14    Lab Values:    Lab Results   Component Value Date/Time    WBC 6.5 11/22/2020 02:44 AM    RBC 3.54 (L) 11/22/2020 02:44 AM    HEMOGLOBIN 9.9 (L) 11/22/2020 02:44 AM    HEMATOCRIT 30.5 (L) 11/22/2020 02:44 AM    CREACTPROT 1.07 (H) 08/12/2020 01:55 PM    SEDRATEWES 85 (H) 08/07/2020 01:20 PM    HBA1C 5.7 (H) 11/09/2018 06:30 PM        Culture Results show:  Recent Results (from the past 720 hour(s))   CULTURE WOUND W/ GRAM STAIN    Collection Time: 09/01/20  2:00 PM    Specimen: Left Leg; Wound   Result Value Ref Range    Significant Indicator POS (POS)     Source WND     Site LEFT LEG     Culture Result - (A)     Gram Stain Result       Moderate Gram positive cocci.  Moderate Gram positive rods.      Culture Result (A)      Beta Hemolytic Streptococcus group A  Moderate growth      Culture Result (A)       Methicillin Resistant Staphylococcus aureus  Moderate growth      Culture Result (A)      Diphtheroids  Moderate growth  Mixed morphologies.         INTERVENTIONS BY WOUND TEAM:    Dressings removed cleansed wound bed with normal saline. Meredith-wound skin protected with benzoin and dermatac drape. Nystatin and silver powder to wound bed, black foam to all of wound bed, secured with regular drape. TRAC pad placed. NPWT resumed, no leaks noted. 2 layer coflex compression to left LE.      Interdisciplinary consultation: Patient, Bedside RN    EVALUATION / RATIONALE FOR TREATMENT:     01/10/2021: Wound vac replaced, patient accidentally removed vac and bedside RN was unable to repair.  Patient tolerated wound VAC placement. Patient had moderate ss with some green tinge drainage.   01/04/2021: 2 layer compression wrap re-applied to left LE due to ACE wrap leaving too much indentation to left LE.   12/31/2020 thigh wound much narrower.  Biofilm redeveloping and odor still present.   12/27/20: Wound bed granular and odor has lessen but still persists.   12/18/20 wound length decreased.  Odor persists.    12/14/2020: re-cultured wound bed.  12/11/2020 POD 23 Length of wounds has decreased, width has not changed.  Odor persists.  Pt continues to become angry with VAC when it limits his mobility.    12/7/2020 POD 19 odor persists, improved wound bed after debridement.  Possible bacterial vs yeast vs fungal infection.  Culture taken.  Nystatin to treat possible fungal infection, silver foam to decrease microbial population.    12/4/2020 POD# 16 odor worsening, more yellow tissue present, will add nystatin and silver foam next week and consider a culture  11/30 POD#11 granular buds visible to wound bed.  Same as prior.   11/27 POD#8 wound is odorous likely related to biologic dressing.  No overt signs of infection.  Granular buds are visible through adaptic.  Edges do not appear as thick as they were prior to last sx.  Continue  with current treatment.    11/23: POD#4 s/p I&D of left LE wounds and biologic placed by Dr. Olvera on 11/19.  Wound bed is flatter and raised edges scar tissue seems to be gone.   11/14 Posterior thigh aspect of wound deteriorating, will increase frequency of treatment to keep biofilm down and hopefully avoid systemic abx.  Will include thigh in compression wrap.  Will consider culture if improvement is not seen in the next few treatments.    11/10: APRN unavailable at this time.  Will re-schedule excisional debridement to next week.   11/5: Plan for debridement of scarred edges on Tues by LPS. Tendon exposed to posterior aspect of wound, behind knee. Continue with current dressing care.  10/29: Excisional debridement by Asaf ELLER of scar tissue along the proximal edge of the posterior knee and lateral thigh.  Lateral aspect of thigh is flatten.  Medial aspect of knee has thick scar tissue that was excisionally debrided by Asaf ELLER.  Fully granulated throughout the wound bed.   10/23 wound bed mostly granular, superficial in depth, progress has been slow with the wound VAC so will trial collagen product to encourage new tissue growth.    10/19: wound bed has improved in color and is fully granulated.  Addison-wound has improved, denudation has resolved. APRN continuing with serial weekly debridements as needed.   10/16: wound friable pt now on iv abx per ID.  Indurated edges addressed with drape and foam.  Addison wound likely has yeast infection - addressing with silver powder crusting  10/14: patient seen with Asaf ELLER, stopping veraflo instillation.  Starting silver powder and regular wound VAC to see if it will help with bioburden.  Possible colonization of bacteria.  ID involved.   10/12: Inflammation noted to the proximal part of the wound bed.  Will continue to monitor, possible vac break on Wednesday to help addison-skin inflammation.   10/7: Excisional debridement performed by Asaf  Justice APRN. Will need to continue selective debridement with NPWT changes, and weekly excisional debridement with APRN to flatten out the scar tissue.  IV abx therapy ended 10/5.   10/5: Lateral wound still with some slough, both wounds smaller per measurements. Medial wound with all granular tissue.  9/30: Patient to start abx therapy for 7 days course per Dr. Ellis.  Started dakin's instillation to veraflo  9/28: malodorous today, culture taken.    9/25: Odor noted, though unclear if from wound or pt, consider shower before next Vac change. Consider regular vac next change, wound granular and superficial.   9/23: Patient still awaiting guardianship for placement.   9/18: wound granulating nicely and responding well to current treatment.    9/16: Wound bed with granular tissue, edges are thick but appear better than previous assessments. . NPWT VF to encourage closure by secondary intention and manage drainage while encouraging oxygenation and granulation to the wound bed. 2 layer compression to assist in decrease of swelling and encourage blood flow to wounds. Wound team to follow up and change 3x/week.      Goals: Steady decrease in wound area and depth weekly.    NURSING PLAN OF CARE ORDERS (X):    Dressing changes: See Dressing Care orders: X  Skin care: See Skin Care orders:   Rectal tube care: See Rectal Tube Care orders:   Other orders:      WOUND TEAM PLAN OF CARE:   Dressing changes by wound team:        Follow up 3 times weekly:                NPWT change 3 times weekly:  X,  NPWT changes 3x/ weekly with ace wrap.  Follow up 1-2 times weekly:      Follow up Bi-Monthly:                   Follow up as needed:       Other (explain):     Anticipated discharge plans:  LTACH:        SNF/Rehab: X - patient will need ongoing wound care for LLE upon discharge - 3x/weekly           Home Health Care:           Outpatient Wound Center:            Self Care:

## 2021-01-10 NOTE — CARE PLAN
Problem: Safety  Goal: Will remain free from injury  Outcome: PROGRESSING AS EXPECTED     Problem: Venous Thromboembolism (VTW)/Deep Vein Thrombosis (DVT) Prevention:  Goal: Patient will participate in Venous Thrombosis (VTE)/Deep Vein Thrombosis (DVT)Prevention Measures  Outcome: PROGRESSING AS EXPECTED     Problem: Psychosocial Needs:  Goal: Level of anxiety will decrease  Outcome: PROGRESSING AS EXPECTED   Pt safety maintained; pain managed per mar prn pain meds. Waiting on plmt for pt.

## 2021-01-10 NOTE — CARE PLAN
Problem: Discharge Barriers/Planning  Goal: Patient's continuum of care needs will be met  Outcome: PROGRESSING AS EXPECTED   Pending guardianship  Problem: Pain Management  Goal: Pain level will decrease to patient's comfort goal  Outcome: PROGRESSING AS EXPECTED

## 2021-01-11 PROCEDURE — A9270 NON-COVERED ITEM OR SERVICE: HCPCS | Performed by: NURSE PRACTITIONER

## 2021-01-11 PROCEDURE — 700102 HCHG RX REV CODE 250 W/ 637 OVERRIDE(OP): Performed by: NURSE PRACTITIONER

## 2021-01-11 PROCEDURE — 99232 SBSQ HOSP IP/OBS MODERATE 35: CPT | Performed by: HOSPITALIST

## 2021-01-11 PROCEDURE — 770001 HCHG ROOM/CARE - MED/SURG/GYN PRIV*

## 2021-01-11 PROCEDURE — 700111 HCHG RX REV CODE 636 W/ 250 OVERRIDE (IP): Performed by: NURSE PRACTITIONER

## 2021-01-11 RX ADMIN — DIVALPROEX SODIUM 125 MG: 125 CAPSULE ORAL at 04:44

## 2021-01-11 RX ADMIN — OXYCODONE HYDROCHLORIDE 10 MG: 10 TABLET ORAL at 08:33

## 2021-01-11 RX ADMIN — RISPERIDONE 1 MG: 1 TABLET ORAL at 17:10

## 2021-01-11 RX ADMIN — DIVALPROEX SODIUM 125 MG: 125 CAPSULE ORAL at 21:44

## 2021-01-11 RX ADMIN — DIVALPROEX SODIUM 125 MG: 125 CAPSULE ORAL at 14:25

## 2021-01-11 RX ADMIN — RISPERIDONE 0.5 MG: 0.5 TABLET ORAL at 04:46

## 2021-01-11 RX ADMIN — GABAPENTIN 200 MG: 100 CAPSULE ORAL at 17:10

## 2021-01-11 RX ADMIN — OXYCODONE HYDROCHLORIDE 10 MG: 10 TABLET ORAL at 21:44

## 2021-01-11 RX ADMIN — CYCLOBENZAPRINE 10 MG: 10 TABLET, FILM COATED ORAL at 21:44

## 2021-01-11 RX ADMIN — OXYCODONE HYDROCHLORIDE 10 MG: 10 TABLET ORAL at 01:14

## 2021-01-11 RX ADMIN — AMLODIPINE BESYLATE 5 MG: 5 TABLET ORAL at 04:44

## 2021-01-11 RX ADMIN — GABAPENTIN 200 MG: 100 CAPSULE ORAL at 12:37

## 2021-01-11 RX ADMIN — GABAPENTIN 200 MG: 100 CAPSULE ORAL at 04:44

## 2021-01-11 RX ADMIN — OXYCODONE HYDROCHLORIDE 10 MG: 10 TABLET ORAL at 14:25

## 2021-01-11 RX ADMIN — ENOXAPARIN SODIUM 40 MG: 40 INJECTION SUBCUTANEOUS at 04:45

## 2021-01-11 ASSESSMENT — PAIN DESCRIPTION - PAIN TYPE: TYPE: ACUTE PAIN

## 2021-01-11 NOTE — PROGRESS NOTES
"I was asked to reevaluate Bola Varela by the case management team and to provide updated opinion on his capacity to make decisions about discharge from the hospital.    I previously visited with Mr. Varela on 11/18/2020 and 11/19/2020.  At that time I recommended that guardianship should be pursued.      Patient remains in the hospital with ongoing needs for wound care.  There has been no significant change in his cognitive abilities.    I visited with the patient on 1/8/2021 and today on 1/11/2021:    The patient remains pleasant and cooperative, he knows that he is in the hospital but is unable to recall any details on his hospital course, he has no idea how long he has been here.  When I ask the patient what his plans would be if he were discharged from the hospital, he appears confused and says \"I don't know,\" the patient does not know where he will live and is unable to provide any details on source of income or support.  It is also noted that the patient has absolutely no recollection of our visit on 1/8/2021 when I revisit him on 1/11/2021.     There is no change to my prior recommendations, my opinion is that the patient's cognitive deficits prevent him from understanding any choices that he may make with regards to discharge.  The patient does not have capacity to make decisions about discharge from the hospital, guardianship should be obtained.    Total visit time = 25 minutes; more than 50% spent counseling/coordinating care including chart review and discussion with case managment        "

## 2021-01-12 PROCEDURE — 700111 HCHG RX REV CODE 636 W/ 250 OVERRIDE (IP): Performed by: NURSE PRACTITIONER

## 2021-01-12 PROCEDURE — 700102 HCHG RX REV CODE 250 W/ 637 OVERRIDE(OP): Performed by: NURSE PRACTITIONER

## 2021-01-12 PROCEDURE — A9270 NON-COVERED ITEM OR SERVICE: HCPCS | Performed by: NURSE PRACTITIONER

## 2021-01-12 PROCEDURE — 770001 HCHG ROOM/CARE - MED/SURG/GYN PRIV*

## 2021-01-12 RX ADMIN — DIVALPROEX SODIUM 125 MG: 125 CAPSULE ORAL at 13:09

## 2021-01-12 RX ADMIN — AMLODIPINE BESYLATE 5 MG: 5 TABLET ORAL at 06:17

## 2021-01-12 RX ADMIN — DIVALPROEX SODIUM 125 MG: 125 CAPSULE ORAL at 21:56

## 2021-01-12 RX ADMIN — DIVALPROEX SODIUM 125 MG: 125 CAPSULE ORAL at 06:16

## 2021-01-12 RX ADMIN — OXYCODONE HYDROCHLORIDE 10 MG: 10 TABLET ORAL at 21:56

## 2021-01-12 RX ADMIN — GABAPENTIN 200 MG: 100 CAPSULE ORAL at 17:19

## 2021-01-12 RX ADMIN — RISPERIDONE 0.5 MG: 0.5 TABLET ORAL at 06:16

## 2021-01-12 RX ADMIN — GABAPENTIN 200 MG: 100 CAPSULE ORAL at 13:09

## 2021-01-12 RX ADMIN — OXYCODONE HYDROCHLORIDE 10 MG: 10 TABLET ORAL at 06:16

## 2021-01-12 RX ADMIN — RISPERIDONE 1 MG: 1 TABLET ORAL at 17:19

## 2021-01-12 RX ADMIN — GABAPENTIN 200 MG: 100 CAPSULE ORAL at 06:16

## 2021-01-12 RX ADMIN — ENOXAPARIN SODIUM 40 MG: 40 INJECTION SUBCUTANEOUS at 06:16

## 2021-01-12 RX ADMIN — OXYCODONE HYDROCHLORIDE 10 MG: 10 TABLET ORAL at 13:09

## 2021-01-12 ASSESSMENT — PAIN DESCRIPTION - PAIN TYPE: TYPE: ACUTE PAIN

## 2021-01-12 NOTE — DISCHARGE PLANNING
"Anticipated Discharge Disposition: Pending guardianship and eventual group home placement.    Action:   Late note from 1/7: Teri Madrigal emailed this LSW to request that Dr. Velázquez do an updated evaluation to determine if pt has regained capacity in order to look at withdrawing the petition and avoiding a contested hearing.    LSW met w/ pt at bedside.  When asked if pt is agreeable to guardianship, pt stated that he doesn't want to agree to anything that he \"might regret later\".  LSW asked pt if he would be interested in going to a group home, pt stated \"I don't want to go to one of those\".  LSW asked what pt's plan would be, pt says that he just needs someone to help him get set up w/ social security so he hassome  income then he could rent a room somewhere and still have his \"freedom\".    According Dr. Velázquez's note yesterday 1/11/21, pt still needs guardianship as he does not have capacity to make decisions about discharge from hospital.     LSW emailed the note by Dr. Velázquez on 1/11 to Consuelo Benito and cc'd Teri Madrigal.    Barriers to Discharge: guardianship pending, placement, financials    Plan: CM to f/u w/ any further needs for guardianship process.    "

## 2021-01-13 PROCEDURE — 700102 HCHG RX REV CODE 250 W/ 637 OVERRIDE(OP): Performed by: NURSE PRACTITIONER

## 2021-01-13 PROCEDURE — 700111 HCHG RX REV CODE 636 W/ 250 OVERRIDE (IP): Performed by: NURSE PRACTITIONER

## 2021-01-13 PROCEDURE — A9270 NON-COVERED ITEM OR SERVICE: HCPCS | Performed by: NURSE PRACTITIONER

## 2021-01-13 PROCEDURE — 770001 HCHG ROOM/CARE - MED/SURG/GYN PRIV*

## 2021-01-13 PROCEDURE — 97606 NEG PRS WND THER DME>50 SQCM: CPT | Mod: XU

## 2021-01-13 PROCEDURE — 700101 HCHG RX REV CODE 250: Performed by: NURSE PRACTITIONER

## 2021-01-13 RX ADMIN — ENOXAPARIN SODIUM 40 MG: 40 INJECTION SUBCUTANEOUS at 05:30

## 2021-01-13 RX ADMIN — RISPERIDONE 0.5 MG: 0.5 TABLET ORAL at 05:30

## 2021-01-13 RX ADMIN — GABAPENTIN 200 MG: 100 CAPSULE ORAL at 05:30

## 2021-01-13 RX ADMIN — DIVALPROEX SODIUM 125 MG: 125 CAPSULE ORAL at 14:52

## 2021-01-13 RX ADMIN — DIVALPROEX SODIUM 125 MG: 125 CAPSULE ORAL at 05:30

## 2021-01-13 RX ADMIN — OXYCODONE HYDROCHLORIDE 10 MG: 10 TABLET ORAL at 14:52

## 2021-01-13 RX ADMIN — LIDOCAINE 1 PATCH: 50 PATCH CUTANEOUS at 12:00

## 2021-01-13 RX ADMIN — RISPERIDONE 1 MG: 1 TABLET ORAL at 17:20

## 2021-01-13 RX ADMIN — GABAPENTIN 200 MG: 100 CAPSULE ORAL at 12:00

## 2021-01-13 RX ADMIN — AMLODIPINE BESYLATE 5 MG: 5 TABLET ORAL at 05:30

## 2021-01-13 RX ADMIN — OXYCODONE HYDROCHLORIDE 10 MG: 10 TABLET ORAL at 05:30

## 2021-01-13 RX ADMIN — DIVALPROEX SODIUM 125 MG: 125 CAPSULE ORAL at 22:21

## 2021-01-13 RX ADMIN — GABAPENTIN 200 MG: 100 CAPSULE ORAL at 17:20

## 2021-01-13 RX ADMIN — OXYCODONE HYDROCHLORIDE 10 MG: 10 TABLET ORAL at 22:21

## 2021-01-13 ASSESSMENT — PAIN DESCRIPTION - PAIN TYPE
TYPE: ACUTE PAIN
TYPE: ACUTE PAIN

## 2021-01-13 ASSESSMENT — PAIN SCALES - WONG BAKER: WONGBAKER_NUMERICALRESPONSE: DOESN'T HURT AT ALL

## 2021-01-13 ASSESSMENT — FIBROSIS 4 INDEX: FIB4 SCORE: 0.74

## 2021-01-13 NOTE — PROGRESS NOTES
Pharmacy Pharmacotherapy Consult for LOS >30 days    Admit Date: 8/7/2020      Medications were reviewed for appropriateness and ongoing need.     Current Facility-Administered Medications   Medication Dose Route Frequency Provider Last Rate Last Admin   • ondansetron (ZOFRAN) syringe/vial injection 4 mg  4 mg Intravenous Q4HRS PRN Naye Florence, A.P.R.N.       • enoxaparin (LOVENOX) inj 40 mg  40 mg Subcutaneous DAILY Naye Florence, A.P.R.N.   40 mg at 01/13/21 0530   • oxyCODONE immediate release (ROXICODONE) tablet 10 mg  10 mg Oral Q6HRS PRN Jeovanny Andres, A.P.R.N.   10 mg at 01/13/21 1452   • gabapentin (NEURONTIN) capsule 200 mg  200 mg Oral TID Gisselle Sinclair, A.P.R.N.   200 mg at 01/13/21 1200   • cyclobenzaprine (Flexeril) tablet 10 mg  10 mg Oral TID PRN Gisselle Sinclair, A.P.R.N.   10 mg at 01/11/21 2144   • lidocaine (LIDODERM) 5 % 1 Patch  1 Patch Transdermal Q24HR Gisselle Bowensson, A.P.R.N.   1 Patch at 01/13/21 1200   • hydrOXYzine HCl (ATARAX) tablet 25 mg  25 mg Oral TID PRN Gisselle Bowensson, A.P.R.N.   25 mg at 01/10/21 2039   • senna-docusate (PERICOLACE or SENOKOT S) 8.6-50 MG per tablet 2 Tab  2 Tab Oral BID PRN Demarcus Mccabe, A.P.N.   2 Tab at 12/07/20 0632    And   • polyethylene glycol/lytes (MIRALAX) PACKET 1 Packet  1 Packet Oral QDAY PRN Demarcus Mccabe, A.P.N.   1 Packet at 11/09/20 1622   • risperiDONE (RISPERDAL) tablet 0.5 mg  0.5 mg Oral DAILY Demarcus Mccabe, A.P.N.   0.5 mg at 01/13/21 0530   • risperiDONE (RISPERDAL) tablet 1 mg  1 mg Oral Q EVENING Demarcus Mccabe, A.P.N.   1 mg at 01/12/21 1719   • haloperidol lactate (HALDOL) injection 2-5 mg  2-5 mg Intramuscular Q4HRS PRN Demarcus Mccabe, A.P.N.   5 mg at 11/05/20 1339   • divalproex (DEPAKOTE SPRINKLE) capsule 125 mg  125 mg Oral Q8HRS Jeovanny Andres, A.P.R.N.   125 mg at 01/13/21 1452   • amLODIPine (NORVASC) tablet 5 mg  5 mg Oral Q DAY Demarcus Mccabe, A.P.N.   5 mg at 01/13/21 7667       Recommendations:    1. Consider  discontinuing Haldol, last used 11/05. No episodes since then. Likely not necessary at this time.     2. Consider discontinuing Zofran IV, never used since start date 11/23. Likely not necessary at this time.     3. Consider discontinuing Miralax from bowel protocol, last used 11/09. Pt having + BM and consistent output, also has PRN Senokot as needed.     4. Consider decreasing Oxycodone dose from 10 mg to 5 mg PRN. Not used very frequently. Pain reassessment required if severe management still necessary. No medication for mild or moderate pain.     Jairo Landin, Pharmacy Intern

## 2021-01-13 NOTE — PROGRESS NOTES
Bedside report completed, assumed pt care.  Pt resting in bed, A&Ox2, self and place.  Assessment complete.   Pt has wound vac LLE, intact, no leak.    Lung sounds clear upon auscultation   not in use  Pt tolerating  diet  Normo active bowel sounds x 4 quadrants upon auscultation.   Last BM today per pt  + flatus  Pt educated on diet, medications, POC   complaints of pain, medicated per MAR. Resting comfortably  Up self  Pt denies numbness, tingling, chest pain, SOB and nausea.   Discussed plan of care with pt.   All questions answered.   Call light within reach, bed in low position, possessions within reach, treaded socks on, floor free from trip hazard, Hourly rounding in place.   SCDs refused despite education.

## 2021-01-13 NOTE — WOUND TEAM
[facility]  Inpatient Services  Wound and Skin Care Progress Note    Admission Date: 8/7/2020     Last order of IP CONSULT TO WOUND CARE was found on 9/1/2020 from Hospital Encounter on 8/7/2020     HPI, PMH, SH: Reviewed    Unit where seen by Wound Team: [id number]    WOUND CONSULT/FOLLOW UP RELATED TO:  Left leg follow-up; scheduled npwt dressing change.    Self Report / Pain Level: Pt c/o mild pain at the proximal posterior thigh.      OBJECTIVE: NPWT dressing and 2 layer compression wrap in place    WOUND TYPE, LOCATION, CHARACTERISTICS (Pressure Injuries: location, stage, POA or date identified)      Negative Pressure Wound Therapy 11/20/20 Leg Lateral;Posterior;Medial Left (Active)   NPWT Pump Mode / Pressure Setting Ulta;Continuous;125 mmHg 01/13/21 1100   Dressing Type Medium;Black Foam (Regular) 01/13/21 1100   Number of Foam Pieces Used 3 01/13/21 1100   Canister Changed No 01/13/21 1100   Output (mL) 200 mL 01/08/21 1000   NEXT Dressing Change/Treatment Date 01/15/21 01/13/21 1100   WOUND NURSE ONLY - Time Spent with Patient (mins) 60 01/13/21 1100     Wound 11/19/20 Full Thickness Wound Leg Lateral;Posterior;Medial Left (Active)   Wound Image    01/13/21 1100   Site Assessment Pink;Red 01/13/21 1100   Periwound Assessment Clean;Dry;Intact;Scar tissue 01/13/21 1100   Margins Defined edges;Attached edges 01/13/21 1100   Closure Secondary intention 01/13/21 1100   Drainage Amount Moderate 01/13/21 1100   Drainage Description Serosanguineous 01/13/21 1100   Treatments Cleansed;Irrigation 01/13/21 1100   Wound Cleansing Approved Wound Cleanser 01/13/21 1100   Periwound Protectant Benzoin;Drape 01/13/21 1100   Dressing Cleansing/Solutions Not Applicable 01/13/21 1100   Dressing Options Wound Vac;Compression Wrap Two Layer 01/13/21 1100   Dressing Changed Changed 01/13/21 1100   Dressing Status Clean;Dry;Intact 01/13/21 1100   Dressing Change/Treatment Frequency By Wound Team Only 01/13/21 1100   NEXT  Dressing Change/Treatment Date 01/15/21 01/13/21 1100   NEXT Weekly Photo (Inpatient Only) 21 1100   Non-staged Wound Description Full thickness 21 1100   Wound Bed Granulation (%) 90 % 21 1100   Wound Bed Slough (%) 10 % 21 1100   Wound Bed Granulation (%) - Post-Procedure 100 % 21 1100   Shape Irregular 21 1100   Wound Odor Mild 21 1100   Pulses Left;2+;DP;PT 21 1100   Exposed Structures None 21 1100   WOUND NURSE ONLY - Time Spent with Patient (mins) 60 21 1100          Wounds are no longer connected - measured as 2 wounds - posterior thigh and medial leg.        VASCULAR    CESAR:   CESAR Results, Last 30 Days Us-cesar Single Level Bilat    Result Date: 2020  Narrative  Vascular Laboratory  Conclusions  1.  Normal bilateral CESAR'sGRUPO FUENTES  Age:    64    Gender:     M  MRN:    [id number]  :    1955      BSA:  Exam Date:     2020 09:59  Room #:     Inpatient  Priority:     Routine  Ht (in):             Wt (lb):  Ordering Physician:        EDDA CANADA  Referring Physician:       [id number] Morrissey  Sonographer:               Deja Ellis RDMS                             RVT  Study Type:                Complete Bilateral  Technical Quality:         Adequate  Indications:     Ulceration of LE  CPT Codes:       57860  ICD Codes:       707.1  History:         Nonhealing wound of left lower extremity.  Limitations:                 RIGHT  Waveform            Systolic BPs (mmHg)                             117           Brachial  Triphasic                                Common Femoral  Triphasic                  138           Posterior Tibial  Triphasic                  132           Dorsalis Pedis                                           Peroneal                             1.18          CESAR                                           TBI                        LEFT  Waveform        Systolic BPs (mmHg)                             114           Brachial  Triphasic                                Common Femoral  Triphasic                  127           Posterior Tibial  Triphasic                  122           Dorsalis Pedis                                           Peroneal                             1.09          KOSTAS                                           TBI  Findings  Right.  Doppler waveforms of the common femoral artery are of high amplitude and  triphasic.  Doppler waveforms at the ankle are brisk and triphasic.  Ankle-brachial index is normal.  Left.  Doppler waveforms of the common femoral artery are of high amplitude and  triphasic.  Doppler waveforms at the ankle are brisk and triphasic.  Ankle-brachial index is normal.  Unable to obtained popliteal waveforms due to wound bandages.  Additional testing was not performed in accordance with lower extremity  arterial evaluation protocol.  Clover Maya  (Electronically Signed)  Final Date:      02 September 2020                   11:14    Lab Values:    Lab Results   Component Value Date/Time    WBC 6.5 11/22/2020 02:44 AM    RBC 3.54 (L) 11/22/2020 02:44 AM    HEMOGLOBIN 9.9 (L) 11/22/2020 02:44 AM    HEMATOCRIT 30.5 (L) 11/22/2020 02:44 AM    CREACTPROT 1.07 (H) 08/12/2020 01:55 PM    SEDRATEWES 85 (H) 08/07/2020 01:20 PM    HBA1C 5.7 (H) 11/09/2018 06:30 PM        Culture Results show:  Recent Results (from the past 720 hour(s))   CULTURE WOUND W/ GRAM STAIN    Collection Time: 09/01/20  2:00 PM    Specimen: Left Leg; Wound   Result Value Ref Range    Significant Indicator POS (POS)     Source WND     Site LEFT LEG     Culture Result - (A)     Gram Stain Result       Moderate Gram positive cocci.  Moderate Gram positive rods.      Culture Result (A)      Beta Hemolytic Streptococcus group A  Moderate growth      Culture Result (A)      Methicillin Resistant Staphylococcus aureus  Moderate growth      Culture  Result (A)      Diphtheroids  Moderate growth  Mixed morphologies.         INTERVENTIONS BY WOUND TEAM:    Dressings removed cleansed wound bed with normal saline. CSWD with curette to remove >20cm2 of non-viable tissue from wound bed.  Meredith-wound skin protected with benzoin and dermatac drape. Nystatin and silver powder to wound bed, black foam to all of wound bed, secured with regular drape. TRAC pad placed. NPWT resumed, no leaks noted. 2 layer coflex compression to left LE.      Interdisciplinary consultation: Patient, Bedside RN    EVALUATION / RATIONALE FOR TREATMENT:     01/10/2021: Wound vac replaced, patient accidentally removed vac and bedside RN was unable to repair.  Patient tolerated wound VAC placement. Patient had moderate ss with some green tinge drainage.   01/04/2021: 2 layer compression wrap re-applied to left LE due to ACE wrap leaving too much indentation to left LE.   12/31/2020 thigh wound much narrower.  Biofilm redeveloping and odor still present.   12/27/20: Wound bed granular and odor has lessen but still persists.   12/18/20 wound length decreased.  Odor persists.    12/14/2020: re-cultured wound bed.  12/11/2020 POD 23 Length of wounds has decreased, width has not changed.  Odor persists.  Pt continues to become angry with VAC when it limits his mobility.    12/7/2020 POD 19 odor persists, improved wound bed after debridement.  Possible bacterial vs yeast vs fungal infection.  Culture taken.  Nystatin to treat possible fungal infection, silver foam to decrease microbial population.    12/4/2020 POD# 16 odor worsening, more yellow tissue present, will add nystatin and silver foam next week and consider a culture  11/30 POD#11 granular buds visible to wound bed.  Same as prior.   11/27 POD#8 wound is odorous likely related to biologic dressing.  No overt signs of infection.  Granular buds are visible through adaptic.  Edges do not appear as thick as they were prior to last sx.  Continue  with current treatment.    11/23: POD#4 s/p I&D of left LE wounds and biologic placed by Dr. Olvera on 11/19.  Wound bed is flatter and raised edges scar tissue seems to be gone.   11/14 Posterior thigh aspect of wound deteriorating, will increase frequency of treatment to keep biofilm down and hopefully avoid systemic abx.  Will include thigh in compression wrap.  Will consider culture if improvement is not seen in the next few treatments.    11/10: APRN unavailable at this time.  Will re-schedule excisional debridement to next week.   11/5: Plan for debridement of scarred edges on Tues by LPS. Tendon exposed to posterior aspect of wound, behind knee. Continue with current dressing care.  10/29: Excisional debridement by Asaf ELLER of scar tissue along the proximal edge of the posterior knee and lateral thigh.  Lateral aspect of thigh is flatten.  Medial aspect of knee has thick scar tissue that was excisionally debrided by Asaf ELLER.  Fully granulated throughout the wound bed.   10/23 wound bed mostly granular, superficial in depth, progress has been slow with the wound VAC so will trial collagen product to encourage new tissue growth.    10/19: wound bed has improved in color and is fully granulated.  Addison-wound has improved, denudation has resolved. APRN continuing with serial weekly debridements as needed.   10/16: wound friable pt now on iv abx per ID.  Indurated edges addressed with drape and foam.  Addison wound likely has yeast infection - addressing with silver powder crusting  10/14: patient seen with Asaf ELLER, stopping veraflo instillation.  Starting silver powder and regular wound VAC to see if it will help with bioburden.  Possible colonization of bacteria.  ID involved.   10/12: Inflammation noted to the proximal part of the wound bed.  Will continue to monitor, possible vac break on Wednesday to help addison-skin inflammation.   10/7: Excisional debridement performed by Asaf  Justice APRN. Will need to continue selective debridement with NPWT changes, and weekly excisional debridement with APRN to flatten out the scar tissue.  IV abx therapy ended 10/5.   10/5: Lateral wound still with some slough, both wounds smaller per measurements. Medial wound with all granular tissue.  9/30: Patient to start abx therapy for 7 days course per Dr. Ellis.  Started dakin's instillation to veraflo  9/28: malodorous today, culture taken.    9/25: Odor noted, though unclear if from wound or pt, consider shower before next Vac change. Consider regular vac next change, wound granular and superficial.   9/23: Patient still awaiting guardianship for placement.   9/18: wound granulating nicely and responding well to current treatment.    9/16: Wound bed with granular tissue, edges are thick but appear better than previous assessments. . NPWT VF to encourage closure by secondary intention and manage drainage while encouraging oxygenation and granulation to the wound bed. 2 layer compression to assist in decrease of swelling and encourage blood flow to wounds. Wound team to follow up and change 3x/week.      Goals: Steady decrease in wound area and depth weekly.    NURSING PLAN OF CARE ORDERS (X):    Dressing changes: See Dressing Care orders: X  Skin care: See Skin Care orders:   Rectal tube care: See Rectal Tube Care orders:   Other orders:      WOUND TEAM PLAN OF CARE:   Dressing changes by wound team:        Follow up 3 times weekly:                NPWT change 3 times weekly:  X,  NPWT changes 3x/ weekly with ace wrap.  Follow up 1-2 times weekly:      Follow up Bi-Monthly:                   Follow up as needed:       Other (explain):     Anticipated discharge plans:  LTACH:        SNF/Rehab: X - patient will need ongoing wound care for LLE upon discharge - 3x/weekly           Home Health Care:           Outpatient Wound Center:            Self Care:

## 2021-01-13 NOTE — CARE PLAN
Problem: Knowledge Deficit  Goal: Knowledge of disease process/condition, treatment plan, diagnostic tests, and medications will improve  Outcome: PROGRESSING AS EXPECTED  Note: Updated on POC, all questions answered.      Problem: Pain Management  Goal: Pain level will decrease to patient's comfort goal  Outcome: PROGRESSING AS EXPECTED  Note: Medicated per MAR

## 2021-01-13 NOTE — THERAPY
Missed Therapy     Patient Name: Bola Varela  Age:  65 y.o., Sex:  male  Medical Record #: [id number]  Today's Date: 1/13/2021    Discussed missed therapy with RN.       01/13/21 1033   Treatment Variance   Reason For Missed Therapy Non-Medical - Other (Please Comment)  (Guardianship court date 1/29/21 - hold until week of 29th)   Interdisciplinary Plan of Care Collaboration   IDT Collaboration with  Nursing   Collaboration Comments RN updated with timing of evaluation. SLP to perform evaluation the week of the patient's Guardianship court date per protocol. Cognitive evaluation to be performed the week of 1/29/21.

## 2021-01-14 PROCEDURE — A9270 NON-COVERED ITEM OR SERVICE: HCPCS | Performed by: NURSE PRACTITIONER

## 2021-01-14 PROCEDURE — 700102 HCHG RX REV CODE 250 W/ 637 OVERRIDE(OP): Performed by: NURSE PRACTITIONER

## 2021-01-14 PROCEDURE — 99231 SBSQ HOSP IP/OBS SF/LOW 25: CPT | Performed by: NURSE PRACTITIONER

## 2021-01-14 PROCEDURE — 700111 HCHG RX REV CODE 636 W/ 250 OVERRIDE (IP): Performed by: NURSE PRACTITIONER

## 2021-01-14 PROCEDURE — 770001 HCHG ROOM/CARE - MED/SURG/GYN PRIV*

## 2021-01-14 PROCEDURE — 700101 HCHG RX REV CODE 250: Performed by: NURSE PRACTITIONER

## 2021-01-14 RX ADMIN — OXYCODONE HYDROCHLORIDE 10 MG: 10 TABLET ORAL at 12:50

## 2021-01-14 RX ADMIN — OXYCODONE HYDROCHLORIDE 10 MG: 10 TABLET ORAL at 20:41

## 2021-01-14 RX ADMIN — RISPERIDONE 0.5 MG: 0.5 TABLET ORAL at 06:20

## 2021-01-14 RX ADMIN — ENOXAPARIN SODIUM 40 MG: 40 INJECTION SUBCUTANEOUS at 06:19

## 2021-01-14 RX ADMIN — DIVALPROEX SODIUM 125 MG: 125 CAPSULE ORAL at 06:20

## 2021-01-14 RX ADMIN — GABAPENTIN 200 MG: 100 CAPSULE ORAL at 17:29

## 2021-01-14 RX ADMIN — AMLODIPINE BESYLATE 5 MG: 5 TABLET ORAL at 06:20

## 2021-01-14 RX ADMIN — RISPERIDONE 1 MG: 1 TABLET ORAL at 17:29

## 2021-01-14 RX ADMIN — GABAPENTIN 200 MG: 100 CAPSULE ORAL at 06:20

## 2021-01-14 RX ADMIN — DIVALPROEX SODIUM 125 MG: 125 CAPSULE ORAL at 14:43

## 2021-01-14 RX ADMIN — OXYCODONE HYDROCHLORIDE 10 MG: 10 TABLET ORAL at 06:19

## 2021-01-14 RX ADMIN — GABAPENTIN 200 MG: 100 CAPSULE ORAL at 12:50

## 2021-01-14 RX ADMIN — LIDOCAINE 1 PATCH: 50 PATCH CUTANEOUS at 12:50

## 2021-01-14 RX ADMIN — DIVALPROEX SODIUM 125 MG: 125 CAPSULE ORAL at 20:41

## 2021-01-14 ASSESSMENT — ENCOUNTER SYMPTOMS
COUGH: 0
HEADACHES: 0
FEVER: 0
SHORTNESS OF BREATH: 0
DIZZINESS: 0
DEPRESSION: 0
ABDOMINAL PAIN: 0
DIARRHEA: 0
NAUSEA: 0
NERVOUS/ANXIOUS: 0
SPEECH CHANGE: 0
MYALGIAS: 1
WEAKNESS: 0
PALPITATIONS: 0
VOMITING: 0
SENSORY CHANGE: 0
CONSTIPATION: 0
FOCAL WEAKNESS: 0
INSOMNIA: 0

## 2021-01-14 ASSESSMENT — PAIN DESCRIPTION - PAIN TYPE
TYPE: SURGICAL PAIN
TYPE: ACUTE PAIN
TYPE: SURGICAL PAIN

## 2021-01-14 NOTE — DISCHARGE PLANNING
"Anticipated Discharge Disposition: Guardianship pending for group home placement.      Action:  Court hearing scheduled Friday 1/29.  Faxed updated notes to Consuelo Benito w/ [facility] at [phone].  LSW assisted w/ Zoom meeting between Consuelo and the pt on Friday 1/22.  Dr. Velázquez provided LSW w/ an updated \"Physician's Certificate with Needs Assessment\" to be sent to Teri Madrigal.  Emailed to Teri Madrigal on 1/25 at 0900.    Barriers to Discharge: pending guardianship, financials, placement    Plan: CM to f/u on any further needs prior to upcoming court hearing.        "

## 2021-01-14 NOTE — CARE PLAN
Problem: Infection  Goal: Will remain free from infection  Outcome: PROGRESSING AS EXPECTED   Pt has wound vac to LLE, working properly. Educate on hand washing techniques.   Problem: Pain Management  Goal: Pain level will decrease to patient's comfort goal  Outcome: PROGRESSING AS EXPECTED   Pain is well managed on oral medications.

## 2021-01-14 NOTE — PROGRESS NOTES
Hospital Medicine Twice Weekly Progress Note    Date of Service  1/14/2021    Chief Complaint  LLE wound    Hospital Course  Mr. Varela is a 65-year-old male who presented to the emergency department with a left lower extremity wound infection. He was hospitalized at our facility in April and evaluated by orthopedic surgery who recommended wound care. Wound cultures at that time grew MSSA and Streptococcus. He had been seen at [facility] earlier that week and prescribed antibiotics, however he had not filled the prescription.  An ultrasound of that extremity was done and was negative for DVT.  He was treated for sepsis and completed an antibiotic course on 9/16/2020. He was also found to have head lice and underwent treatment for that.  A repeat wound culture was done on 9/28/2020 and grew Strep A and Proteus. Infectious disease was consulted and recommended a 5-day course of IV zosyn which was completed on 10/5/2020. On 10/12/2020 the wound care team noticed increased inflammation to the proximal part of the wound bed and switched from a vera flow wound VAC to a regular wound VAC.  ID was again consulted and on 10/14/2020 recommended to 7-day course of antibiotics with meropenem which was completed on 10/22/2020. Additionally, he was noted to have intermittent agitation so was started on risperdal, depakote, and gabapentin per psychiatric recommendations.  On 11/20/2020 he underwent left lower extremity debridement with wound VAC placement. Since then he has remained stable.  He has been deemed incapacitated to make medical decisions and is currently pending guardianship (which he is contesting) and placement, likely to a group home. His court date is now scheduled for 1/29/2021 at 10 a.m.      Interval Problem Update  Sitting at edge of bed this morning in no acute distress. Calm and pleasant mood. He denied any c/o pain, but says that's only because he hasn't been up walking yet. We discussed discharge  plan and guardianship hearing on 1/26. Cog eval will be completed next week per SLP.    Consultants/Specialty  LPS  ID  Psychiatry    Code Status  Full Code    Disposition  Contesting guardianship, hearing is 1/29/2021.     Review of Systems  Review of Systems   Constitutional: Negative for fever and malaise/fatigue.   Respiratory: Negative for cough and shortness of breath.    Cardiovascular: Negative for chest pain, palpitations and leg swelling.   Gastrointestinal: Negative for abdominal pain, constipation, diarrhea, nausea and vomiting.   Genitourinary: Negative for dysuria.   Musculoskeletal: Positive for myalgias (Lower left extremity pain).   Skin: Negative for itching and rash.   Neurological: Negative for dizziness, sensory change, speech change, focal weakness, weakness and headaches.   Psychiatric/Behavioral: Negative for depression. The patient is not nervous/anxious and does not have insomnia.       Physical Exam  Temp:  [36.7 °C (98 °F)-37 °C (98.6 °F)] 36.7 °C (98 °F)  Pulse:  [75-76] 75  Resp:  [16-18] 18  BP: (100-123)/(56-72) 123/72  SpO2:  [98 %-99 %] 99 %    Physical Exam  Vitals signs and nursing note reviewed.   Constitutional:       General: He is sleeping. He is not in acute distress.     Appearance: Normal appearance. He is well-developed. He is not ill-appearing.   HENT:      Head: Normocephalic and atraumatic.      Mouth/Throat:      Lips: Pink.      Mouth: Mucous membranes are moist.   Eyes:      Pupils: Pupils are equal, round, and reactive to light.   Neck:      Musculoskeletal: Normal range of motion and neck supple.   Cardiovascular:      Rate and Rhythm: Normal rate and regular rhythm.      Pulses: Normal pulses.      Heart sounds: Normal heart sounds.   Pulmonary:      Effort: Pulmonary effort is normal.      Breath sounds: Normal breath sounds.   Abdominal:      General: Abdomen is flat. Bowel sounds are normal. There is no abdominal bruit.      Palpations: Abdomen is soft.       Tenderness: There is no abdominal tenderness.   Musculoskeletal:      Right lower leg: No edema.      Left lower leg: No edema.   Skin:     General: Skin is warm and dry.      Comments: LLE wound, see LPS documentation for details.  Wound vac now in place.   Neurological:      General: No focal deficit present.      Mental Status: He is oriented to person, place, and time and easily aroused.      GCS: GCS eye subscore is 4. GCS verbal subscore is 5. GCS motor subscore is 6.   Psychiatric:         Attention and Perception: Attention and perception normal.         Mood and Affect: Mood and affect normal.         Speech: Speech normal.         Behavior: Behavior normal. Behavior is cooperative.         Thought Content: Thought content normal.         Cognition and Memory: Cognition normal. He exhibits impaired recent memory.         Judgment: Judgment normal.     Fluids    Intake/Output Summary (Last 24 hours) at 1/14/2021 0931  Last data filed at 1/13/2021 1726  Gross per 24 hour   Intake 860 ml   Output --   Net 860 ml     Laboratory    Imaging  IR-US GUIDED PIV   Final Result    Ultrasound-guided PERIPHERAL IV INSERTION performed by    qualified nursing staff as above.      IR-MIDLINE CATHETER INSERTION WO GUIDANCE > AGE 3   Final Result                  Ultrasound-guided midline placement performed by qualified nursing staff    as above.          IR-US GUIDED PIV   Final Result    Ultrasound-guided PERIPHERAL IV INSERTION performed by    qualified nursing staff as above.      IR-MIDLINE CATHETER INSERTION WO GUIDANCE > AGE 3   Final Result                  Ultrasound-guided midline placement performed by qualified nursing staff    as above.          US-KOSTAS SINGLE LEVEL BILAT   Final Result      CT-HEAD W/O   Final Result      No acute intracranial abnormality      DX-TIBIA AND FIBULA LEFT   Final Result      1.  Healed distal metaphyseal fractures of the left fibula and tibia with tibial IM layla in place.      2.   No acute fracture or dislocation.      3.  No radiopaque soft tissue foreign body.      DX-FEMUR-2+ LEFT   Final Result      1.  No radiographic evidence of acute traumatic injury left femur.      2.  Unchanged cortical thickening in the posterior aspect of the distal femoral diaphysis which may represent sequela of prior injury or infection.      3.  No soft tissue foreign body identified.      US-EXTREMITY VENOUS LOWER UNILAT LEFT   Final Result      DX-CHEST-PORTABLE (1 VIEW)   Final Result      No acute cardiopulmonary abnormality.         Assessment/Plan  * Wound of left leg- (present on admission)  Assessment & Plan  -Wound vac back in place.   -Further management per wound care/LPS/ortho.   -Pain control as needed.     Encephalopathy- (present on admission)  Assessment & Plan  -Improved   -Per psychiatry and Dr. Velázquez on 1/11: Patient is incapacitated.  -Guardianship and placement pending.  Patient is contesting guardianship, hearing is now scheduled for 1/29/2021 at 10 AM.  -Repeat cognitive evaluation to be preformed the week of hearing    Psychiatric disorder- (present on admission)  Assessment & Plan  -Unspecified.  -Continue Risperdal and Depakote.  -Psychiatry has consulted and deemed him incapacitated to leave AMA or make medical decisions.  -Pending guardianship, which he is contesting.  -Last cognitive evaluation 8/20.  Court requesting repeat cognitive evaluation prior to hearing at the end of January.  Patient's medical condition has significantly improved since prior cognitive evaluation.      Essential hypertension- (present on admission)  Assessment & Plan  -Well controlled on amlodipine.    Flexion contracture of knee, 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)  Assessment & Plan  -Secondary to chronic wound in that area.  -PT/OT.  -Does not seem to limit his mobility.  Is frequently ambulatory.    Infection of wound due to methicillin resistant Staphylococcus aureus (MRSA)- (present on admission)  Assessment  & Plan  -History of MRSA.  -Poor compliance with OP wound care. Poor compliance with wound vac at times.   -Continue pain control as needed.  -LPS and wound care following - debridements and wound VAC changes per their recommendations  -Cultures repeated 12/14 - positive for Proteus species, pan sensitive.  Completed regimen of augmentin.     Emphysema/COPD (HCC)- (present on admission)  Assessment & Plan  -Chronic and stable off medication, without acute exacerbation.    Alcohol dependence (HCC)- (present on admission)  Assessment & Plan  -History of. Abstinent since admission.    Protein malnutrition (HCC)- (present on admission)  Assessment & Plan  -Body mass index is 21.35 kg/m².   -Continue TID supplements.  -Encourage PO intake.    Tobacco dependence- (present on admission)  Assessment & Plan  -Abstinent since admission.     VTE prophylaxis: Lovenox      NATALEE [email or web address].

## 2021-01-15 LAB
FUNGUS SPEC CULT: NORMAL
SIGNIFICANT IND 70042: NORMAL
SITE SITE: NORMAL
SOURCE SOURCE: NORMAL

## 2021-01-15 PROCEDURE — 700102 HCHG RX REV CODE 250 W/ 637 OVERRIDE(OP): Performed by: NURSE PRACTITIONER

## 2021-01-15 PROCEDURE — 700101 HCHG RX REV CODE 250: Performed by: NURSE PRACTITIONER

## 2021-01-15 PROCEDURE — A9270 NON-COVERED ITEM OR SERVICE: HCPCS | Performed by: NURSE PRACTITIONER

## 2021-01-15 PROCEDURE — 770001 HCHG ROOM/CARE - MED/SURG/GYN PRIV*

## 2021-01-15 PROCEDURE — 700111 HCHG RX REV CODE 636 W/ 250 OVERRIDE (IP): Performed by: NURSE PRACTITIONER

## 2021-01-15 PROCEDURE — 97598 DBRDMT OPN WND ADDL 20CM/<: CPT

## 2021-01-15 RX ADMIN — RISPERIDONE 1 MG: 1 TABLET ORAL at 16:08

## 2021-01-15 RX ADMIN — RISPERIDONE 0.5 MG: 0.5 TABLET ORAL at 04:34

## 2021-01-15 RX ADMIN — OXYCODONE HYDROCHLORIDE 10 MG: 10 TABLET ORAL at 12:07

## 2021-01-15 RX ADMIN — DIVALPROEX SODIUM 125 MG: 125 CAPSULE ORAL at 22:32

## 2021-01-15 RX ADMIN — ENOXAPARIN SODIUM 40 MG: 40 INJECTION SUBCUTANEOUS at 04:34

## 2021-01-15 RX ADMIN — GABAPENTIN 200 MG: 100 CAPSULE ORAL at 04:34

## 2021-01-15 RX ADMIN — OXYCODONE HYDROCHLORIDE 10 MG: 10 TABLET ORAL at 18:24

## 2021-01-15 RX ADMIN — GABAPENTIN 200 MG: 100 CAPSULE ORAL at 13:16

## 2021-01-15 RX ADMIN — OXYCODONE HYDROCHLORIDE 10 MG: 10 TABLET ORAL at 04:34

## 2021-01-15 RX ADMIN — GABAPENTIN 200 MG: 100 CAPSULE ORAL at 16:09

## 2021-01-15 RX ADMIN — LIDOCAINE 1 PATCH: 50 PATCH CUTANEOUS at 13:17

## 2021-01-15 RX ADMIN — DIVALPROEX SODIUM 125 MG: 125 CAPSULE ORAL at 13:19

## 2021-01-15 RX ADMIN — CYCLOBENZAPRINE 10 MG: 10 TABLET, FILM COATED ORAL at 16:07

## 2021-01-15 RX ADMIN — DIVALPROEX SODIUM 125 MG: 125 CAPSULE ORAL at 04:34

## 2021-01-15 ASSESSMENT — PAIN DESCRIPTION - PAIN TYPE
TYPE: SURGICAL PAIN

## 2021-01-15 NOTE — CARE PLAN
Problem: Safety  Goal: Will remain free from injury  Outcome: PROGRESSING AS EXPECTED     Problem: Discharge Barriers/Planning  Goal: Patient's continuum of care needs will be met  Note: guardianship

## 2021-01-16 PROCEDURE — 700102 HCHG RX REV CODE 250 W/ 637 OVERRIDE(OP): Performed by: NURSE PRACTITIONER

## 2021-01-16 PROCEDURE — A9270 NON-COVERED ITEM OR SERVICE: HCPCS | Performed by: NURSE PRACTITIONER

## 2021-01-16 PROCEDURE — 700111 HCHG RX REV CODE 636 W/ 250 OVERRIDE (IP): Performed by: NURSE PRACTITIONER

## 2021-01-16 PROCEDURE — 770001 HCHG ROOM/CARE - MED/SURG/GYN PRIV*

## 2021-01-16 RX ADMIN — RISPERIDONE 1 MG: 1 TABLET ORAL at 16:01

## 2021-01-16 RX ADMIN — OXYCODONE HYDROCHLORIDE 10 MG: 10 TABLET ORAL at 01:11

## 2021-01-16 RX ADMIN — DIVALPROEX SODIUM 125 MG: 125 CAPSULE ORAL at 15:36

## 2021-01-16 RX ADMIN — GABAPENTIN 200 MG: 100 CAPSULE ORAL at 16:01

## 2021-01-16 RX ADMIN — ENOXAPARIN SODIUM 40 MG: 40 INJECTION SUBCUTANEOUS at 06:04

## 2021-01-16 RX ADMIN — RISPERIDONE 0.5 MG: 0.5 TABLET ORAL at 06:03

## 2021-01-16 RX ADMIN — GABAPENTIN 200 MG: 100 CAPSULE ORAL at 06:03

## 2021-01-16 RX ADMIN — OXYCODONE HYDROCHLORIDE 10 MG: 10 TABLET ORAL at 15:36

## 2021-01-16 RX ADMIN — DIVALPROEX SODIUM 125 MG: 125 CAPSULE ORAL at 22:53

## 2021-01-16 RX ADMIN — OXYCODONE HYDROCHLORIDE 10 MG: 10 TABLET ORAL at 22:53

## 2021-01-16 RX ADMIN — AMLODIPINE BESYLATE 5 MG: 5 TABLET ORAL at 06:03

## 2021-01-16 RX ADMIN — DIVALPROEX SODIUM 125 MG: 125 CAPSULE ORAL at 06:03

## 2021-01-16 ASSESSMENT — PAIN DESCRIPTION - PAIN TYPE
TYPE: SURGICAL PAIN

## 2021-01-16 NOTE — WOUND TEAM
[facility]  Inpatient Services  Wound and Skin Care Progress Note    Admission Date: 8/7/2020     Last order of IP CONSULT TO WOUND CARE was found on 9/1/2020 from Hospital Encounter on 8/7/2020     HPI, PMH, SH: Reviewed    Unit where seen by Wound Team: [id number]    WOUND CONSULT/FOLLOW UP RELATED TO:  Left leg follow-up; scheduled npwt dressing change.    Self Report / Pain Level: Pt c/o mild pain at the proximal posterior thigh.      OBJECTIVE: NPWT dressing and 2 layer compression wrap in place    WOUND TYPE, LOCATION, CHARACTERISTICS (Pressure Injuries: location, stage, POA or date identified)     Negative Pressure Wound Therapy 11/20/20 Leg Lateral;Posterior;Medial Left (Active)   NPWT Pump Mode / Pressure Setting Ulta;Continuous;125 mmHg 01/16/21 0833   Dressing Type Medium;Black Foam (Regular) 01/16/21 0833   Number of Foam Pieces Used 3 01/14/21 2000   Canister Changed No 01/16/21 0833   Output (mL) 350 mL 01/16/21 0310   NEXT Dressing Change/Treatment Date 01/15/21 01/13/21 1100   WOUND NURSE ONLY - Time Spent with Patient (mins) 60 01/13/21 1100           Wound 11/19/20 Full Thickness Wound Leg Lateral;Posterior;Medial Left (Active)   Wound Image    01/13/21 1100   Site Assessment RONNI 01/16/21 0833   Periwound Assessment RONNI 01/16/21 0833   Margins RONNI 01/16/21 0833   Closure RONNI 01/16/21 0833   Drainage Amount None 01/16/21 0833   Drainage Description Serosanguineous 01/16/21 0833   Treatments Site care;CSWD - Conservative Sharp Wound Debridement 01/15/21 1500   Wound Cleansing Approved Wound Cleanser 01/16/21 0833   Periwound Protectant Benzoin;Drape 01/16/21 0833   Dressing Cleansing/Solutions Not Applicable 01/16/21 0833   Dressing Options Wound Vac;Compression Wrap Two Layer 01/16/21 0833   Dressing Changed Changed 01/15/21 1500   Dressing Status Clean;Dry;Intact 01/16/21 0833   Dressing Change/Treatment Frequency By Wound Team Only 01/16/21 0833   NEXT Dressing Change/Treatment Date  01/15/21 01/13/21 1100   NEXT Weekly Photo (Inpatient Only) 21 1100   Non-staged Wound Description Full thickness 21 0833   Wound Bed Granulation (%) 90 % 01/15/21 1500   Wound Bed Slough (%) 10 % 01/15/21 1500   Wound Bed Granulation (%) - Post-Procedure 100 % 21 1100   Shape irregular 01/15/21 1500   Wound Odor Foul 01/15/21 1500   Pulses Left;2+;DP;PT 21 1100   Exposed Structures None 01/15/21 1500   WOUND NURSE ONLY - Time Spent with Patient (mins) 75 01/15/21 1500           Wounds are no longer connected - measured as 2 wounds - posterior thigh and medial leg.        VASCULAR    CESAR:   CESAR Results, Last 30 Days Us-cesar Single Level Bilat    Result Date: 2020  Narrative  Vascular Laboratory  Conclusions  1.  Normal bilateral CESAR's.  GRUPO GANN  Age:    64    Gender:     M  MRN:    [id number]  :    1955      BSA:  Exam Date:     2020 09:59  Room #:     Inpatient  Priority:     Routine  Ht (in):             Wt (lb):  Ordering Physician:        EDDA CANADA  Referring Physician:       [id number]  Sonographer:               Deja Ellis RDMS                             RVT  Study Type:                Complete Bilateral  Technical Quality:         Adequate  Indications:     Ulceration of LE  CPT Codes:       70238  ICD Codes:       707.1  History:         Nonhealing wound of left lower extremity.  Limitations:                 RIGHT  Waveform            Systolic BPs (mmHg)                             117           Brachial  Triphasic                                Common Femoral  Triphasic                  138           Posterior Tibial  Triphasic                  132           Dorsalis Pedis                                           Peroneal                             1.18          CESAR                                           TBI                       LEFT  Waveform        Systolic  BPs (mmHg)                             114           Brachial  Triphasic                                Common Femoral  Triphasic                  127           Posterior Tibial  Triphasic                  122           Dorsalis Pedis                                           Peroneal                             1.09          KOSTAS                                           TBI  Findings  Right.  Doppler waveforms of the common femoral artery are of high amplitude and  triphasic.  Doppler waveforms at the ankle are brisk and triphasic.  Ankle-brachial index is normal.  Left.  Doppler waveforms of the common femoral artery are of high amplitude and  triphasic.  Doppler waveforms at the ankle are brisk and triphasic.  Ankle-brachial index is normal.  Unable to obtained popliteal waveforms due to wound bandages.  Additional testing was not performed in accordance with lower extremity  arterial evaluation protocol.  Clover Maya  (Electronically Signed)  Final Date:      02 September 2020                   11:14    Lab Values:    Lab Results   Component Value Date/Time    WBC 6.5 11/22/2020 02:44 AM    RBC 3.54 (L) 11/22/2020 02:44 AM    HEMOGLOBIN 9.9 (L) 11/22/2020 02:44 AM    HEMATOCRIT 30.5 (L) 11/22/2020 02:44 AM    CREACTPROT 1.07 (H) 08/12/2020 01:55 PM    SEDRATEWES 85 (H) 08/07/2020 01:20 PM    HBA1C 5.7 (H) 11/09/2018 06:30 PM        Culture Results show:  Recent Results (from the past 720 hour(s))   CULTURE WOUND W/ GRAM STAIN    Collection Time: 09/01/20  2:00 PM    Specimen: Left Leg; Wound   Result Value Ref Range    Significant Indicator POS (POS)     Source WND     Site LEFT LEG     Culture Result - (A)     Gram Stain Result       Moderate Gram positive cocci.  Moderate Gram positive rods.      Culture Result (A)      Beta Hemolytic Streptococcus group A  Moderate growth      Culture Result (A)      Methicillin Resistant Staphylococcus aureus  Moderate growth      Culture Result (A)       Diphtheroids  Moderate growth  Mixed morphologies.         INTERVENTIONS BY WOUND TEAM:    Dressings removed cleansed wound bed with normal saline. CSWD with scalpel to remove >20cm2 of non-viable tissue from wound bed.  Meredith-wound skin protected with benzoin and dermatac drape. Nystatin and silver powder to wound bed, black foam to all of wound bed, secured with regular drape. TRAC pad placed. NPWT resumed, no leaks noted. 2 layer coflex compression to left LE.      Interdisciplinary consultation: Patient, Bedside RN    EVALUATION / RATIONALE FOR TREATMENT:     1/15/21: position of leg may affect posterior thigh measurements.  Medial Leg wound area decreased.    01/10/2021: Wound vac replaced, patient accidentally removed vac and bedside RN was unable to repair.  Patient tolerated wound VAC placement. Patient had moderate ss with some green tinge drainage.   01/04/2021: 2 layer compression wrap re-applied to left LE due to ACE wrap leaving too much indentation to left LE.   12/31/2020 thigh wound much narrower.  Biofilm redeveloping and odor still present.   12/27/20: Wound bed granular and odor has lessen but still persists.   12/18/20 wound length decreased.  Odor persists.    12/14/2020: re-cultured wound bed.  12/11/2020 POD 23 Length of wounds has decreased, width has not changed.  Odor persists.  Pt continues to become angry with VAC when it limits his mobility.    12/7/2020 POD 19 odor persists, improved wound bed after debridement.  Possible bacterial vs yeast vs fungal infection.  Culture taken.  Nystatin to treat possible fungal infection, silver foam to decrease microbial population.    12/4/2020 POD# 16 odor worsening, more yellow tissue present, will add nystatin and silver foam next week and consider a culture  11/30 POD#11 granular buds visible to wound bed.  Same as prior.   11/27 POD#8 wound is odorous likely related to biologic dressing.  No overt signs of infection.  Granular buds are visible  through adaptic.  Edges do not appear as thick as they were prior to last sx.  Continue with current treatment.    11/23: POD#4 s/p I&D of left LE wounds and biologic placed by Dr. Olvera on 11/19.  Wound bed is flatter and raised edges scar tissue seems to be gone.   11/14 Posterior thigh aspect of wound deteriorating, will increase frequency of treatment to keep biofilm down and hopefully avoid systemic abx.  Will include thigh in compression wrap.  Will consider culture if improvement is not seen in the next few treatments.    11/10: APRN unavailable at this time.  Will re-schedule excisional debridement to next week.   11/5: Plan for debridement of scarred edges on Tues by LPS. Tendon exposed to posterior aspect of wound, behind knee. Continue with current dressing care.  10/29: Excisional debridement by Asaf ELLER of scar tissue along the proximal edge of the posterior knee and lateral thigh.  Lateral aspect of thigh is flatten.  Medial aspect of knee has thick scar tissue that was excisionally debrided by Asaf ELLER.  Fully granulated throughout the wound bed.   10/23 wound bed mostly granular, superficial in depth, progress has been slow with the wound VAC so will trial collagen product to encourage new tissue growth.    10/19: wound bed has improved in color and is fully granulated.  Meredith-wound has improved, denudation has resolved. APRN continuing with serial weekly debridements as needed.   10/16: wound friable pt now on iv abx per ID.  Indurated edges addressed with drape and foam.  Meredith wound likely has yeast infection - addressing with silver powder crusting  10/14: patient seen with Asaf ELLER, stopping veraflo instillation.  Starting silver powder and regular wound VAC to see if it will help with bioburden.  Possible colonization of bacteria.  ID involved.   10/12: Inflammation noted to the proximal part of the wound bed.  Will continue to monitor, possible vac break on  Wednesday to help addison-skin inflammation.   10/7: Excisional debridement performed by Asaf ELLER. Will need to continue selective debridement with NPWT changes, and weekly excisional debridement with APRN to flatten out the scar tissue.  IV abx therapy ended 10/5.   10/5: Lateral wound still with some slough, both wounds smaller per measurements. Medial wound with all granular tissue.  9/30: Patient to start abx therapy for 7 days course per Dr. Ellsi.  Started dakin's instillation to veraflo  9/28: malodorous today, culture taken.    9/25: Odor noted, though unclear if from wound or pt, consider shower before next Vac change. Consider regular vac next change, wound granular and superficial.   9/23: Patient still awaiting guardianship for placement.   9/18: wound granulating nicely and responding well to current treatment.    9/16: Wound bed with granular tissue, edges are thick but appear better than previous assessments. . NPWT VF to encourage closure by secondary intention and manage drainage while encouraging oxygenation and granulation to the wound bed. 2 layer compression to assist in decrease of swelling and encourage blood flow to wounds. Wound team to follow up and change 3x/week.      Goals: Steady decrease in wound area and depth weekly.    NURSING PLAN OF CARE ORDERS (X):    Dressing changes: See Dressing Care orders: X  Skin care: See Skin Care orders:   Rectal tube care: See Rectal Tube Care orders:   Other orders:      WOUND TEAM PLAN OF CARE:   Dressing changes by wound team:        Follow up 3 times weekly:                NPWT change 3 times weekly:  X,  NPWT changes 3x/ weekly with ace wrap.  Follow up 1-2 times weekly:      Follow up Bi-Monthly:                   Follow up as needed:       Other (explain):     Anticipated discharge plans:  LTACH:        SNF/Rehab: X - patient will need ongoing wound care for LLE upon discharge - 3x/weekly           Home Health Care:           Outpatient  Wound Center:            Self Care:

## 2021-01-16 NOTE — CARE PLAN
Problem: Infection  Goal: Will remain free from infection  Outcome: PROGRESSING AS EXPECTED   Afebrile.  Wound vac remains in place.      Problem: Pain Management  Goal: Pain level will decrease to patient's comfort goal  Outcome: PROGRESSING AS EXPECTED   Pain controlled with prn medication.

## 2021-01-16 NOTE — CARE PLAN
Problem: Safety  Goal: Will remain free from injury  Outcome: PROGRESSING AS EXPECTED     Problem: Infection  Goal: Will remain free from infection  Outcome: PROGRESSING AS EXPECTED     Problem: Venous Thromboembolism (VTW)/Deep Vein Thrombosis (DVT) Prevention:  Goal: Patient will participate in Venous Thrombosis (VTE)/Deep Vein Thrombosis (DVT)Prevention Measures  Outcome: PROGRESSING AS EXPECTED     Problem: Knowledge Deficit  Goal: Knowledge of disease process/condition, treatment plan, diagnostic tests, and medications will improve  Outcome: PROGRESSING AS EXPECTED     Problem: Discharge Barriers/Planning  Goal: Patient's continuum of care needs will be met  Outcome: PROGRESSING AS EXPECTED

## 2021-01-16 NOTE — PROGRESS NOTES
Report received from Day RN at 0155. Assumed care of patient. Plan of care discussed, questions answered. Assessment completed. VSS, A&O x4, denies pain. PRN med given. Call light in reach. Patient educated on use of call light for assistance.    Wound vac in place

## 2021-01-17 PROCEDURE — A9270 NON-COVERED ITEM OR SERVICE: HCPCS | Performed by: NURSE PRACTITIONER

## 2021-01-17 PROCEDURE — 700102 HCHG RX REV CODE 250 W/ 637 OVERRIDE(OP): Performed by: NURSE PRACTITIONER

## 2021-01-17 PROCEDURE — 99231 SBSQ HOSP IP/OBS SF/LOW 25: CPT | Performed by: NURSE PRACTITIONER

## 2021-01-17 PROCEDURE — 700101 HCHG RX REV CODE 250: Performed by: NURSE PRACTITIONER

## 2021-01-17 PROCEDURE — 700111 HCHG RX REV CODE 636 W/ 250 OVERRIDE (IP): Performed by: NURSE PRACTITIONER

## 2021-01-17 PROCEDURE — 770001 HCHG ROOM/CARE - MED/SURG/GYN PRIV*

## 2021-01-17 RX ADMIN — RISPERIDONE 1 MG: 1 TABLET ORAL at 16:11

## 2021-01-17 RX ADMIN — AMLODIPINE BESYLATE 5 MG: 5 TABLET ORAL at 04:03

## 2021-01-17 RX ADMIN — GABAPENTIN 200 MG: 100 CAPSULE ORAL at 11:53

## 2021-01-17 RX ADMIN — RISPERIDONE 0.5 MG: 0.5 TABLET ORAL at 04:03

## 2021-01-17 RX ADMIN — DIVALPROEX SODIUM 125 MG: 125 CAPSULE ORAL at 21:40

## 2021-01-17 RX ADMIN — LIDOCAINE 1 PATCH: 50 PATCH CUTANEOUS at 13:24

## 2021-01-17 RX ADMIN — ENOXAPARIN SODIUM 40 MG: 40 INJECTION SUBCUTANEOUS at 04:03

## 2021-01-17 RX ADMIN — CYCLOBENZAPRINE 10 MG: 10 TABLET, FILM COATED ORAL at 16:10

## 2021-01-17 RX ADMIN — GABAPENTIN 200 MG: 100 CAPSULE ORAL at 16:11

## 2021-01-17 RX ADMIN — OXYCODONE HYDROCHLORIDE 10 MG: 10 TABLET ORAL at 21:40

## 2021-01-17 RX ADMIN — DIVALPROEX SODIUM 125 MG: 125 CAPSULE ORAL at 13:25

## 2021-01-17 RX ADMIN — GABAPENTIN 200 MG: 100 CAPSULE ORAL at 04:03

## 2021-01-17 RX ADMIN — DIVALPROEX SODIUM 125 MG: 125 CAPSULE ORAL at 04:03

## 2021-01-17 RX ADMIN — OXYCODONE HYDROCHLORIDE 10 MG: 10 TABLET ORAL at 16:10

## 2021-01-17 RX ADMIN — CYCLOBENZAPRINE 10 MG: 10 TABLET, FILM COATED ORAL at 04:03

## 2021-01-17 RX ADMIN — OXYCODONE HYDROCHLORIDE 10 MG: 10 TABLET ORAL at 10:01

## 2021-01-17 ASSESSMENT — ENCOUNTER SYMPTOMS
WEAKNESS: 0
DEPRESSION: 0
INSOMNIA: 0
DIARRHEA: 0
COUGH: 0
PALPITATIONS: 0
FEVER: 0
NERVOUS/ANXIOUS: 0
FOCAL WEAKNESS: 0
SENSORY CHANGE: 0
SHORTNESS OF BREATH: 0
NAUSEA: 0
SPEECH CHANGE: 0
CONSTIPATION: 0
HEADACHES: 0
DIZZINESS: 0
MYALGIAS: 1
ABDOMINAL PAIN: 0
VOMITING: 0

## 2021-01-17 ASSESSMENT — PAIN DESCRIPTION - PAIN TYPE
TYPE: SURGICAL PAIN

## 2021-01-17 NOTE — PROGRESS NOTES
[facility] Medicine Twice Weekly Progress Note    Date of Service  1/17/2021    Chief Complaint  LLE wound    Hospital Course  Mr. Varela is a 65-year-old male who presented to the emergency department with a left lower extremity wound infection. He was hospitalized at our facility in April and evaluated by orthopedic surgery who recommended wound care. Wound cultures at that time grew MSSA and Streptococcus. He had been seen at [facility] earlier that week and prescribed antibiotics, however he had not filled the prescription.  An ultrasound of that extremity was done and was negative for DVT.  He was treated for sepsis and completed an antibiotic course on 9/16/2020. He was also found to have head lice and underwent treatment for that.  A repeat wound culture was done on 9/28/2020 and grew Strep A and Proteus. Infectious disease was consulted and recommended a 5-day course of IV zosyn which was completed on 10/5/2020. On 10/12/2020 the wound care team noticed increased inflammation to the proximal part of the wound bed and switched from a vera flow wound VAC to a regular wound VAC.  ID was again consulted and on 10/14/2020 recommended to 7-day course of antibiotics with meropenem which was completed on 10/22/2020. Additionally, he was noted to have intermittent agitation so was started on risperdal, depakote, and gabapentin per psychiatric recommendations.  On 11/20/2020 he underwent left lower extremity debridement with wound VAC placement. Since then he has remained stable.  He has been deemed incapacitated to make medical decisions and is currently pending guardianship (which he is contesting) and placement, likely to a group home. His court date is now scheduled for 1/29/2021 at 10 a.m.      Interval Problem Update  Alert and oriented to self, place and situation. He reports 8/10 left leg pain this morning after ambulation. Wound vac in place and without evidence of air leak. SBPs  on room air.  No acute changes in PE    Consultants/Specialty  LPS  ID  Psychiatry    Code Status  Full Code    Disposition  Contesting guardianship, hearing is 1/29/2021.     Review of Systems  Review of Systems   Constitutional: Negative for fever and malaise/fatigue.   Respiratory: Negative for cough and shortness of breath.    Cardiovascular: Negative for chest pain, palpitations and leg swelling.   Gastrointestinal: Negative for abdominal pain, constipation, diarrhea, nausea and vomiting.   Genitourinary: Negative for dysuria.   Musculoskeletal: Positive for myalgias (Lower left extremity pain).   Skin: Negative for itching and rash.   Neurological: Negative for dizziness, sensory change, speech change, focal weakness, weakness and headaches.   Psychiatric/Behavioral: Negative for depression. The patient is not nervous/anxious and does not have insomnia.       Physical Exam  Temp:  [36.1 °C (96.9 °F)-36.6 °C (97.8 °F)] 36.6 °C (97.8 °F)  Pulse:  [80-83] 83  Resp:  [16] 16  BP: ()/(66) 94/66  SpO2:  [90 %-96 %] 96 %    Physical Exam  Vitals signs and nursing note reviewed.   Constitutional:       General: He is sleeping. He is not in acute distress.     Appearance: Normal appearance. He is well-developed. He is not ill-appearing.   HENT:      Head: Normocephalic and atraumatic.      Mouth/Throat:      Lips: Pink.      Mouth: Mucous membranes are moist.   Eyes:      Pupils: Pupils are equal, round, and reactive to light.   Neck:      Musculoskeletal: Normal range of motion and neck supple.   Cardiovascular:      Rate and Rhythm: Normal rate and regular rhythm.      Pulses: Normal pulses.      Heart sounds: Normal heart sounds.   Pulmonary:      Effort: Pulmonary effort is normal.      Breath sounds: Normal breath sounds.   Abdominal:      General: Abdomen is flat. Bowel sounds are normal. There is no abdominal bruit.      Palpations: Abdomen is soft.      Tenderness: There is no abdominal tenderness.   Musculoskeletal:       Right lower leg: No edema.      Left lower leg: No edema.   Skin:     General: Skin is warm and dry.      Comments: LLE wound, see LPS documentation for details.  Wound vac now in place.   Neurological:      General: No focal deficit present.      Mental Status: He is oriented to person, place, and time and easily aroused.      GCS: GCS eye subscore is 4. GCS verbal subscore is 5. GCS motor subscore is 6.   Psychiatric:         Attention and Perception: Attention and perception normal.         Mood and Affect: Mood and affect normal.         Speech: Speech normal.         Behavior: Behavior normal. Behavior is cooperative.         Thought Content: Thought content normal.         Cognition and Memory: Cognition normal. He exhibits impaired recent memory.         Judgment: Judgment normal.     Fluids    Intake/Output Summary (Last 24 hours) at 1/17/2021 0854  Last data filed at 1/16/2021 1800  Gross per 24 hour   Intake 960 ml   Output --   Net 960 ml     Laboratory    Imaging  IR-US GUIDED PIV   Final Result    Ultrasound-guided PERIPHERAL IV INSERTION performed by    qualified nursing staff as above.      IR-MIDLINE CATHETER INSERTION WO GUIDANCE > AGE 3   Final Result                  Ultrasound-guided midline placement performed by qualified nursing staff    as above.          IR-US GUIDED PIV   Final Result    Ultrasound-guided PERIPHERAL IV INSERTION performed by    qualified nursing staff as above.      IR-MIDLINE CATHETER INSERTION WO GUIDANCE > AGE 3   Final Result                  Ultrasound-guided midline placement performed by qualified nursing staff    as above.          US-KOSTAS SINGLE LEVEL BILAT   Final Result      CT-HEAD W/O   Final Result      No acute intracranial abnormality      DX-TIBIA AND FIBULA LEFT   Final Result      1.  Healed distal metaphyseal fractures of the left fibula and tibia with tibial IM layla in place.      2.  No acute fracture or dislocation.      3.  No radiopaque soft  tissue foreign body.      DX-FEMUR-2+ LEFT   Final Result      1.  No radiographic evidence of acute traumatic injury left femur.      2.  Unchanged cortical thickening in the posterior aspect of the distal femoral diaphysis which may represent sequela of prior injury or infection.      3.  No soft tissue foreign body identified.      US-EXTREMITY VENOUS LOWER UNILAT LEFT   Final Result      DX-CHEST-PORTABLE (1 VIEW)   Final Result      No acute cardiopulmonary abnormality.         Assessment/Plan  * Wound of left leg- (present on admission)  Assessment & Plan  -Wound vac back in place.   -Further management per wound care/LPS/ortho.   -Pain control as needed.     Encephalopathy- (present on admission)  Assessment & Plan  -Improved   -Per psychiatry and Dr. Velázquez on 1/11: Patient is incapacitated.  -Guardianship and placement pending.  Patient is contesting guardianship, hearing is now scheduled for 1/29/2021 at 10 AM.  -Repeat cognitive evaluation to be preformed the week of hearing    Psychiatric disorder- (present on admission)  Assessment & Plan  -Unspecified.  -Continue Risperdal and Depakote.  -Psychiatry has consulted and deemed him incapacitated to leave AMA or make medical decisions.  -Pending guardianship, which he is contesting.  -Last cognitive evaluation 8/20.  Court requesting repeat cognitive evaluation prior to hearing at the end of January.  Patient's medical condition has significantly improved since prior cognitive evaluation.      Essential hypertension- (present on admission)  Assessment & Plan  -Well controlled on amlodipine.    Flexion contracture of knee, 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)  Assessment & Plan  -Secondary to chronic wound in that area.  -PT/OT.  -Does not seem to limit his mobility.  Is frequently ambulatory.    Infection of wound due to methicillin resistant Staphylococcus aureus (MRSA)- (present on admission)  Assessment & Plan  -History of MRSA.  -Poor compliance with OP wound  care. Poor compliance with wound vac at times.   -Continue pain control as needed.  -LPS and wound care following - debridements and wound VAC changes per their recommendations  -Cultures repeated 12/14 - positive for Proteus species, pan sensitive.  Completed regimen of augmentin.     Emphysema/COPD (HCC)- (present on admission)  Assessment & Plan  -Chronic and stable off medication, without acute exacerbation.    Alcohol dependence ([facility])- (present on admission)  Assessment & Plan  -History of. Abstinent since admission.    Protein malnutrition (HCC)- (present on admission)  Assessment & Plan  -Body mass index is 21.35 kg/m².   -Continue TID supplements.  -Encourage PO intake.    Tobacco dependence- (present on admission)  Assessment & Plan  -Abstinent since admission.     VTE prophylaxis: Lovenox      NATALEE [email or web address].

## 2021-01-18 PROCEDURE — 700102 HCHG RX REV CODE 250 W/ 637 OVERRIDE(OP): Performed by: NURSE PRACTITIONER

## 2021-01-18 PROCEDURE — 700111 HCHG RX REV CODE 636 W/ 250 OVERRIDE (IP): Performed by: NURSE PRACTITIONER

## 2021-01-18 PROCEDURE — A9270 NON-COVERED ITEM OR SERVICE: HCPCS | Performed by: NURSE PRACTITIONER

## 2021-01-18 PROCEDURE — 770001 HCHG ROOM/CARE - MED/SURG/GYN PRIV*

## 2021-01-18 PROCEDURE — 97606 NEG PRS WND THER DME>50 SQCM: CPT

## 2021-01-18 RX ADMIN — GABAPENTIN 200 MG: 100 CAPSULE ORAL at 04:02

## 2021-01-18 RX ADMIN — AMLODIPINE BESYLATE 5 MG: 5 TABLET ORAL at 04:02

## 2021-01-18 RX ADMIN — OXYCODONE HYDROCHLORIDE 10 MG: 10 TABLET ORAL at 13:43

## 2021-01-18 RX ADMIN — RISPERIDONE 0.5 MG: 0.5 TABLET ORAL at 04:02

## 2021-01-18 RX ADMIN — DIVALPROEX SODIUM 125 MG: 125 CAPSULE ORAL at 13:44

## 2021-01-18 RX ADMIN — OXYCODONE HYDROCHLORIDE 10 MG: 10 TABLET ORAL at 19:42

## 2021-01-18 RX ADMIN — HYDROXYZINE HYDROCHLORIDE 25 MG: 50 TABLET, FILM COATED ORAL at 22:04

## 2021-01-18 RX ADMIN — CYCLOBENZAPRINE 10 MG: 10 TABLET, FILM COATED ORAL at 02:44

## 2021-01-18 RX ADMIN — RISPERIDONE 1 MG: 1 TABLET ORAL at 19:43

## 2021-01-18 RX ADMIN — DIVALPROEX SODIUM 125 MG: 125 CAPSULE ORAL at 22:05

## 2021-01-18 RX ADMIN — HYDROXYZINE HYDROCHLORIDE 25 MG: 50 TABLET, FILM COATED ORAL at 02:44

## 2021-01-18 RX ADMIN — GABAPENTIN 200 MG: 100 CAPSULE ORAL at 19:42

## 2021-01-18 RX ADMIN — CYCLOBENZAPRINE 10 MG: 10 TABLET, FILM COATED ORAL at 19:43

## 2021-01-18 RX ADMIN — GABAPENTIN 200 MG: 100 CAPSULE ORAL at 13:43

## 2021-01-18 RX ADMIN — HYDROXYZINE HYDROCHLORIDE 25 MG: 50 TABLET, FILM COATED ORAL at 13:43

## 2021-01-18 RX ADMIN — ENOXAPARIN SODIUM 40 MG: 40 INJECTION SUBCUTANEOUS at 04:02

## 2021-01-18 RX ADMIN — DIVALPROEX SODIUM 125 MG: 125 CAPSULE ORAL at 04:02

## 2021-01-18 ASSESSMENT — PAIN DESCRIPTION - PAIN TYPE
TYPE: ACUTE PAIN

## 2021-01-19 PROCEDURE — A9270 NON-COVERED ITEM OR SERVICE: HCPCS | Performed by: NURSE PRACTITIONER

## 2021-01-19 PROCEDURE — 700102 HCHG RX REV CODE 250 W/ 637 OVERRIDE(OP): Performed by: NURSE PRACTITIONER

## 2021-01-19 PROCEDURE — 700111 HCHG RX REV CODE 636 W/ 250 OVERRIDE (IP): Performed by: NURSE PRACTITIONER

## 2021-01-19 PROCEDURE — 770001 HCHG ROOM/CARE - MED/SURG/GYN PRIV*

## 2021-01-19 PROCEDURE — 700101 HCHG RX REV CODE 250: Performed by: NURSE PRACTITIONER

## 2021-01-19 RX ADMIN — ENOXAPARIN SODIUM 40 MG: 40 INJECTION SUBCUTANEOUS at 06:10

## 2021-01-19 RX ADMIN — RISPERIDONE 0.5 MG: 0.5 TABLET ORAL at 06:10

## 2021-01-19 RX ADMIN — DIVALPROEX SODIUM 125 MG: 125 CAPSULE ORAL at 06:10

## 2021-01-19 RX ADMIN — HYDROXYZINE HYDROCHLORIDE 25 MG: 50 TABLET, FILM COATED ORAL at 06:10

## 2021-01-19 RX ADMIN — CYCLOBENZAPRINE 10 MG: 10 TABLET, FILM COATED ORAL at 20:56

## 2021-01-19 RX ADMIN — RISPERIDONE 1 MG: 1 TABLET ORAL at 17:56

## 2021-01-19 RX ADMIN — DIVALPROEX SODIUM 125 MG: 125 CAPSULE ORAL at 12:56

## 2021-01-19 RX ADMIN — AMLODIPINE BESYLATE 5 MG: 5 TABLET ORAL at 06:10

## 2021-01-19 RX ADMIN — GABAPENTIN 200 MG: 100 CAPSULE ORAL at 12:57

## 2021-01-19 RX ADMIN — CYCLOBENZAPRINE 10 MG: 10 TABLET, FILM COATED ORAL at 06:10

## 2021-01-19 RX ADMIN — GABAPENTIN 200 MG: 100 CAPSULE ORAL at 17:56

## 2021-01-19 RX ADMIN — HYDROXYZINE HYDROCHLORIDE 25 MG: 50 TABLET, FILM COATED ORAL at 20:58

## 2021-01-19 RX ADMIN — GABAPENTIN 200 MG: 100 CAPSULE ORAL at 06:10

## 2021-01-19 RX ADMIN — OXYCODONE HYDROCHLORIDE 10 MG: 10 TABLET ORAL at 12:57

## 2021-01-19 RX ADMIN — LIDOCAINE 1 PATCH: 50 PATCH CUTANEOUS at 12:57

## 2021-01-19 RX ADMIN — OXYCODONE HYDROCHLORIDE 10 MG: 10 TABLET ORAL at 20:56

## 2021-01-19 RX ADMIN — OXYCODONE HYDROCHLORIDE 10 MG: 10 TABLET ORAL at 02:21

## 2021-01-19 RX ADMIN — DIVALPROEX SODIUM 125 MG: 125 CAPSULE ORAL at 20:56

## 2021-01-19 ASSESSMENT — PAIN DESCRIPTION - PAIN TYPE
TYPE: ACUTE PAIN

## 2021-01-19 NOTE — WOUND TEAM
[facility]  Inpatient Services  Wound and Skin Care Progress Note    Admission Date: 8/7/2020     Last order of IP CONSULT TO WOUND CARE was found on 9/1/2020 from Hospital Encounter on 8/7/2020     HPI, PMH, SH: Reviewed    Unit where seen by Wound Team: [id number]    WOUND CONSULT/FOLLOW UP RELATED TO:  Left leg follow-up; scheduled npwt dressing change.    Self Report / Pain Level: Pt c/o moderate pain at the proximal posterior thigh; yelling out and raising hand.      OBJECTIVE: NPWT dressing and 2 layer compression wrap in place    WOUND TYPE, LOCATION, CHARACTERISTICS (Pressure Injuries: location, stage, POA or date identified)  Negative Pressure Wound Therapy 11/20/20 Leg Lateral;Posterior;Medial Left (Active)   NPWT Pump Mode / Pressure Setting Continuous;150 mmHg 01/18/21 1600   Dressing Type Black Foam (Regular) 01/18/21 1600   Number of Foam Pieces Used 4 01/18/21 1600   Canister Changed No 01/17/21 2000   Output (mL) 350 mL 01/16/21 0310   NEXT Dressing Change/Treatment Date 01/20/21 01/18/21 1600   WOUND NURSE ONLY - Time Spent with Patient (mins) 75 01/18/21 1600     Wound 11/19/20 Full Thickness Wound Leg Lateral;Posterior;Medial Left (Active)   Wound Image    01/13/21 1100   Site Assessment Pink;Red 01/18/21 1600   Periwound Assessment Scar tissue;Denuded 01/18/21 1600   Margins Defined edges;Attached edges 01/18/21 1600   Closure Secondary intention 01/18/21 1600   Drainage Amount Moderate 01/18/21 1600   Drainage Description Serosanguineous 01/18/21 1600   Treatments Cleansed;Site care 01/18/21 1600   Wound Cleansing Approved Wound Cleanser 01/18/21 1600   Periwound Protectant Benzoin;Drape 01/18/21 1600   Dressing Cleansing/Solutions Not Applicable 01/18/21 1600   Dressing Options Wound Vac;Compression Wrap Two Layer 01/18/21 1600   Dressing Changed Changed 01/18/21 1600   Dressing Status Clean;Dry;Intact 01/18/21 1600   Dressing Change/Treatment Frequency By Wound Team Only 01/18/21  1600   NEXT Dressing Change/Treatment Date 21 1600   NEXT Weekly Photo (Inpatient Only) 21 1600   Non-staged Wound Description Full thickness 21 1600   Wound Bed Granulation (%) 90 % 01/15/21 1500   Wound Bed Slough (%) 10 % 01/15/21 1500   Wound Bed Granulation (%) - Post-Procedure 100 % 21 1100   Shape Irregular 21 1600   Wound Odor Mild;Foul 21 1600   Pulses Left;2+;DP;PT 21 1600   Exposed Structures None 21 1600   WOUND NURSE ONLY - Time Spent with Patient (mins) 75 21 1600     Wounds are no longer connected - measured as 2 wounds - posterior thigh and medial leg.        VASCULAR    CESAR:   CESAR Results, Last 30 Days Us-cesar Single Level Bilat    Result Date: 2020  Narrative  Vascular Laboratory  Conclusions  1.  Normal bilateral CESAR's.  GRUPO GANN  Age:    64    Gender:     M  MRN:    [id number]  :    1955      BSA:  Exam Date:     2020 09:59  Room #:     Inpatient  Priority:     Routine  Ht (in):             Wt (lb):  Ordering Physician:        EDDA CANADA  Referring Physician:       [id number] Morrissey  Sonographer:               Deja Ellis RDMS                             T  Study Type:                Complete Bilateral  Technical Quality:         Adequate  Indications:     Ulceration of LE  CPT Codes:       86545  ICD Codes:       707.1  History:         Nonhealing wound of left lower extremity.  Limitations:                 RIGHT  Waveform            Systolic BPs (mmHg)                             117           Brachial  Triphasic                                Common Femoral  Triphasic                  138           Posterior Tibial  Triphasic                  132           Dorsalis Pedis                                           Peroneal                             1.18          CESAR                                           TBI                        LEFT  Waveform        Systolic BPs (mmHg)                             114           Brachial  Triphasic                                Common Femoral  Triphasic                  127           Posterior Tibial  Triphasic                  122           Dorsalis Pedis                                           Peroneal                             1.09          KOSTAS                                           TBI  Findings  Right.  Doppler waveforms of the common femoral artery are of high amplitude and  triphasic.  Doppler waveforms at the ankle are brisk and triphasic.  Ankle-brachial index is normal.  Left.  Doppler waveforms of the common femoral artery are of high amplitude and  triphasic.  Doppler waveforms at the ankle are brisk and triphasic.  Ankle-brachial index is normal.  Unable to obtained popliteal waveforms due to wound bandages.  Additional testing was not performed in accordance with lower extremity  arterial evaluation protocol.  Clover Myaa  (Electronically Signed)  Final Date:      02 September 2020                   11:14    Lab Values:    Lab Results   Component Value Date/Time    WBC 6.5 11/22/2020 02:44 AM    RBC 3.54 (L) 11/22/2020 02:44 AM    HEMOGLOBIN 9.9 (L) 11/22/2020 02:44 AM    HEMATOCRIT 30.5 (L) 11/22/2020 02:44 AM    CREACTPROT 1.07 (H) 08/12/2020 01:55 PM    SEDRATEWES 85 (H) 08/07/2020 01:20 PM    HBA1C 5.7 (H) 11/09/2018 06:30 PM        Culture Results show:  Recent Results (from the past 720 hour(s))   CULTURE WOUND W/ GRAM STAIN    Collection Time: 09/01/20  2:00 PM    Specimen: Left Leg; Wound   Result Value Ref Range    Significant Indicator POS (POS)     Source WND     Site LEFT LEG     Culture Result - (A)     Gram Stain Result       Moderate Gram positive cocci.  Moderate Gram positive rods.      Culture Result (A)      Beta Hemolytic Streptococcus group A  Moderate growth      Culture Result (A)      Methicillin Resistant Staphylococcus aureus  Moderate growth       Culture Result (A)      Diphtheroids  Moderate growth  Mixed morphologies.         INTERVENTIONS BY WOUND TEAM: Dressings removed, cleansed wound bed with normal saline. Meredith-wound skin protected with benzoin and dermatac drape. Nystatin and silver powder to wound bed, black foam to all of wound bed, secured with regular drape. TRAC pad placed. NPWT resumed, no leaks noted. 2 layer coflex compression to left LE.    Interdisciplinary consultation: Patient, Bedside RN Sunita, Wound care RN Sera    EVALUATION / RATIONALE FOR TREATMENT:     1/15/21: position of leg may affect posterior thigh measurements.  Medial Leg wound area decreased.    01/10/2021: Wound vac replaced, patient accidentally removed vac and bedside RN was unable to repair.  Patient tolerated wound VAC placement. Patient had moderate ss with some green tinge drainage.   01/04/2021: 2 layer compression wrap re-applied to left LE due to ACE wrap leaving too much indentation to left LE.   12/31/2020 thigh wound much narrower.  Biofilm redeveloping and odor still present.   12/27/20: Wound bed granular and odor has lessen but still persists.   12/18/20 wound length decreased.  Odor persists.    12/14/2020: re-cultured wound bed.  12/11/2020 POD 23 Length of wounds has decreased, width has not changed.  Odor persists.  Pt continues to become angry with VAC when it limits his mobility.    12/7/2020 POD 19 odor persists, improved wound bed after debridement.  Possible bacterial vs yeast vs fungal infection.  Culture taken.  Nystatin to treat possible fungal infection, silver foam to decrease microbial population.    12/4/2020 POD# 16 odor worsening, more yellow tissue present, will add nystatin and silver foam next week and consider a culture  11/30 POD#11 granular buds visible to wound bed.  Same as prior.   11/27 POD#8 wound is odorous likely related to biologic dressing.  No overt signs of infection.  Granular buds are visible through adaptic.  Edges do  not appear as thick as they were prior to last sx.  Continue with current treatment.    11/23: POD#4 s/p I&D of left LE wounds and biologic placed by Dr. Olvera on 11/19.  Wound bed is flatter and raised edges scar tissue seems to be gone.   11/14 Posterior thigh aspect of wound deteriorating, will increase frequency of treatment to keep biofilm down and hopefully avoid systemic abx.  Will include thigh in compression wrap.  Will consider culture if improvement is not seen in the next few treatments.    11/10: APRN unavailable at this time.  Will re-schedule excisional debridement to next week.   11/5: Plan for debridement of scarred edges on Tues by LPS. Tendon exposed to posterior aspect of wound, behind knee. Continue with current dressing care.  10/29: Excisional debridement by Asaf ELLER of scar tissue along the proximal edge of the posterior knee and lateral thigh.  Lateral aspect of thigh is flatten.  Medial aspect of knee has thick scar tissue that was excisionally debrided by Asaf ELLER.  Fully granulated throughout the wound bed.   10/23 wound bed mostly granular, superficial in depth, progress has been slow with the wound VAC so will trial collagen product to encourage new tissue growth.    10/19: wound bed has improved in color and is fully granulated.  Addison-wound has improved, denudation has resolved. APRN continuing with serial weekly debridements as needed.   10/16: wound friable pt now on iv abx per ID.  Indurated edges addressed with drape and foam.  Addison wound likely has yeast infection - addressing with silver powder crusting  10/14: patient seen with Asaf ELLER, stopping veraflo instillation.  Starting silver powder and regular wound VAC to see if it will help with bioburden.  Possible colonization of bacteria.  ID involved.   10/12: Inflammation noted to the proximal part of the wound bed.  Will continue to monitor, possible vac break on Wednesday to help addison-skin  inflammation.   10/7: Excisional debridement performed by Asaf ELLER. Will need to continue selective debridement with NPWT changes, and weekly excisional debridement with APRN to flatten out the scar tissue.  IV abx therapy ended 10/5.   10/5: Lateral wound still with some slough, both wounds smaller per measurements. Medial wound with all granular tissue.  9/30: Patient to start abx therapy for 7 days course per Dr. Ellis.  Started dakin's instillation to veraflo  9/28: malodorous today, culture taken.    9/25: Odor noted, though unclear if from wound or pt, consider shower before next Vac change. Consider regular vac next change, wound granular and superficial.   9/23: Patient still awaiting guardianship for placement.   9/18: wound granulating nicely and responding well to current treatment.    9/16: Wound bed with granular tissue, edges are thick but appear better than previous assessments. . NPWT VF to encourage closure by secondary intention and manage drainage while encouraging oxygenation and granulation to the wound bed. 2 layer compression to assist in decrease of swelling and encourage blood flow to wounds. Wound team to follow up and change 3x/week.      Goals: Steady decrease in wound area and depth weekly.    NURSING PLAN OF CARE ORDERS (X):    Dressing changes: See Dressing Care orders: X  Skin care: See Skin Care orders:   Rectal tube care: See Rectal Tube Care orders:   Other orders:      WOUND TEAM PLAN OF CARE:   Dressing changes by wound team:        Follow up 3 times weekly:                NPWT change 3 times weekly:  X,  NPWT changes 3x/ weekly with ace wrap.  Follow up 1-2 times weekly:      Follow up Bi-Monthly:                   Follow up as needed:       Other (explain):     Anticipated discharge plans:  LTACH:        SNF/Rehab: X - patient will need ongoing wound care for LLE upon discharge - 3x/weekly           Home Health Care:           Outpatient Wound Center:             Self Care:

## 2021-01-20 PROCEDURE — 700102 HCHG RX REV CODE 250 W/ 637 OVERRIDE(OP): Performed by: NURSE PRACTITIONER

## 2021-01-20 PROCEDURE — A9270 NON-COVERED ITEM OR SERVICE: HCPCS | Performed by: NURSE PRACTITIONER

## 2021-01-20 PROCEDURE — 700111 HCHG RX REV CODE 636 W/ 250 OVERRIDE (IP): Performed by: NURSE PRACTITIONER

## 2021-01-20 PROCEDURE — 700101 HCHG RX REV CODE 250: Performed by: NURSE PRACTITIONER

## 2021-01-20 PROCEDURE — 97606 NEG PRS WND THER DME>50 SQCM: CPT

## 2021-01-20 PROCEDURE — 770001 HCHG ROOM/CARE - MED/SURG/GYN PRIV*

## 2021-01-20 RX ADMIN — RISPERIDONE 0.5 MG: 0.5 TABLET ORAL at 05:24

## 2021-01-20 RX ADMIN — RISPERIDONE 1 MG: 1 TABLET ORAL at 17:51

## 2021-01-20 RX ADMIN — DIVALPROEX SODIUM 125 MG: 125 CAPSULE ORAL at 05:24

## 2021-01-20 RX ADMIN — OXYCODONE HYDROCHLORIDE 10 MG: 10 TABLET ORAL at 20:22

## 2021-01-20 RX ADMIN — GABAPENTIN 200 MG: 100 CAPSULE ORAL at 13:14

## 2021-01-20 RX ADMIN — HYDROXYZINE HYDROCHLORIDE 25 MG: 50 TABLET, FILM COATED ORAL at 10:06

## 2021-01-20 RX ADMIN — GABAPENTIN 200 MG: 100 CAPSULE ORAL at 05:24

## 2021-01-20 RX ADMIN — DIVALPROEX SODIUM 125 MG: 125 CAPSULE ORAL at 20:22

## 2021-01-20 RX ADMIN — DIVALPROEX SODIUM 125 MG: 125 CAPSULE ORAL at 13:13

## 2021-01-20 RX ADMIN — GABAPENTIN 200 MG: 100 CAPSULE ORAL at 17:52

## 2021-01-20 RX ADMIN — LIDOCAINE 1 PATCH: 50 PATCH CUTANEOUS at 13:14

## 2021-01-20 RX ADMIN — OXYCODONE HYDROCHLORIDE 10 MG: 10 TABLET ORAL at 13:13

## 2021-01-20 RX ADMIN — CYCLOBENZAPRINE 10 MG: 10 TABLET, FILM COATED ORAL at 10:07

## 2021-01-20 RX ADMIN — ENOXAPARIN SODIUM 40 MG: 40 INJECTION SUBCUTANEOUS at 05:24

## 2021-01-20 RX ADMIN — OXYCODONE HYDROCHLORIDE 10 MG: 10 TABLET ORAL at 05:24

## 2021-01-20 ASSESSMENT — PAIN DESCRIPTION - PAIN TYPE
TYPE: ACUTE PAIN
TYPE: CHRONIC PAIN
TYPE: CHRONIC PAIN

## 2021-01-20 ASSESSMENT — FIBROSIS 4 INDEX: FIB4 SCORE: 0.74

## 2021-01-20 NOTE — CARE PLAN
Problem: Safety  Goal: Will remain free from injury  Outcome: PROGRESSING AS EXPECTED   Bed locked in lowest position. Call light within reach. Hourly rounding in place.  Problem: Venous Thromboembolism (VTW)/Deep Vein Thrombosis (DVT) Prevention:  Goal: Patient will participate in Venous Thrombosis (VTE)/Deep Vein Thrombosis (DVT)Prevention Measures  Outcome: PROGRESSING AS EXPECTED   Lovenox ordered. Pt ambulates frequently.  Problem: Pain Management  Goal: Pain level will decrease to patient's comfort goal  Outcome: PROGRESSING AS EXPECTED   Pt verbalizes pain relieve with current pain management.

## 2021-01-20 NOTE — WOUND TEAM
[facility]  Inpatient Services  Wound and Skin Care Progress Note    Admission Date: 8/7/2020     Last order of IP CONSULT TO WOUND CARE was found on 9/1/2020 from Hospital Encounter on 8/7/2020     HPI, PMH, SH: Reviewed    Unit where seen by Wound Team: [id number]    WOUND CONSULT/FOLLOW UP RELATED TO:  Left leg follow-up; scheduled npwt dressing change.    Self Report / Pain Level: Pt c/o moderate pain at the proximal posterior thigh; yelling out and raising hand.      OBJECTIVE: NPWT dressing and 2 layer compression wrap in place    WOUND TYPE, LOCATION, CHARACTERISTICS (Pressure Injuries: location, stage, POA or date identified)  Skin Risk/[place]   Sensory Perception: No Impairment  Moisture: Rarely Moist  Activity: Walks Frequently  Mobility: No Limitations  Nutrition: Adequate  Friction and Shear: No Apparent Problem  Total Score: 22  Skin Breakdown Risk: 18-23 Minimal Risk for Skin Breakdown    Sensory Interventions  Bed Types: Pressure Redistribution Mattress (Atmosair)  Skin Preventative Measures: Pillows in Use for Support / Positioning  Skin Preventative Dressing: Mepilex  Moisturizers/Barriers: Moisturizer   PT / OT Involved in Care: Physical Therapy Involved, Occupational Therapy Involved  Activity : Ambulated, Up to bathroom  Patient Turns / Repositioning: Patient Turns Self from Side to Side  Patient is Receiving Nutrition: Oral Intake Adequate  Nutrition Consult Ordered: No, Consult has Already been Placed  Vitamin Therapy in Use: No  Friction Interventions: Draw Sheet / Pad Used for Repositioning                Negative Pressure Wound Therapy 11/20/20 Leg Lateral;Posterior;Medial Left (Active)   NPWT Pump Mode / Pressure Setting Ulta;Continuous;150 mmHg 01/20/21 1500   Dressing Type Medium;Black Foam (Regular) 01/20/21 1500   Number of Foam Pieces Used 3 01/20/21 1500   Canister Changed No 01/20/21 1500   Output (mL) 350 mL 01/16/21 0310   NEXT Dressing Change/Treatment Date 01/22/21  21 1500   WOUND NURSE ONLY - Time Spent with Patient (mins) 75 21 1600           Wound 20 Full Thickness Wound Leg Lateral;Posterior;Medial Left (Active)   Wound Image    21 1500   Site Assessment Red;Tye 21 1500   Periwound Assessment Clean;Dry;Intact;Scar tissue 21 1500   Margins Defined edges 21 1500   Closure Secondary intention 21 1500   Drainage Amount Moderate 21 1500   Drainage Description Serosanguineous 21 1500   Treatments Cleansed;Site care;Compression 21 1500   Wound Cleansing Approved Wound Cleanser 21 1500   Periwound Protectant Drape;Skin Protectant Wipes to Periwound 21 1500   Dressing Cleansing/Solutions Not Applicable 21 1500   Dressing Options Wound Vac 21 1500   Dressing Changed Changed 21 1500   Dressing Status Clean;Dry;Intact 21 1500   Dressing Change/Treatment Frequency By Wound Team Only 21 1500   NEXT Dressing Change/Treatment Date 21 1500   NEXT Weekly Photo (Inpatient Only) 21 1500   Non-staged Wound Description Full thickness 21 1500   Wound Bed Granulation (%) 90 % 01/15/21 1500   Wound Bed Slough (%) 10 % 01/15/21 1500   Wound Bed Granulation (%) - Post-Procedure 100 % 21 1100   Shape Irregular 21 1500   Wound Odor Mild;Foul 21 1500   Pulses Left;2+;DP;PT 21 1500   Exposed Structures None 21 1500   WOUND NURSE ONLY - Time Spent with Patient (mins) 45 21 1500            Wounds are no longer connected - measured as 2 wounds - posterior thigh and medial leg.        VASCULAR    KOSTAS:   KOSTAS Results, Last 30 Days Us-kostas Single Level Bilat    Result Date: 2020  Narrative  Vascular Laboratory  Conclusions  1.  Normal bilateral KOSTAS's.  GRUPO GANN  Age:    64    Gender:     M  MRN:    [id number]  :    1955      BSA:  Exam Date:     2020 09:59  Room #:     Inpatient  Priority:     Routine  Ht (in):              Wt (lb):  Ordering Physician:        EDDA CANADA  Referring Physician:       [id number]  Sonographer:               Deja Ellis RDMS                             RVT  Study Type:                Complete Bilateral  Technical Quality:         Adequate  Indications:     Ulceration of LE  CPT Codes:       32262  ICD Codes:       707.1  History:         Nonhealing wound of left lower extremity.  Limitations:                 RIGHT  Waveform            Systolic BPs (mmHg)                             117           Brachial  Triphasic                                Common Femoral  Triphasic                  138           Posterior Tibial  Triphasic                  132           Dorsalis Pedis                                           Peroneal                             1.18          KOSTAS                                           TBI                       LEFT  Waveform        Systolic BPs (mmHg)                             114           Brachial  Triphasic                                Common Femoral  Triphasic                  127           Posterior Tibial  Triphasic                  122           Dorsalis Pedis                                           Peroneal                             1.09          KOSTAS                                           TBI  Findings  Right.  Doppler waveforms of the common femoral artery are of high amplitude and  triphasic.  Doppler waveforms at the ankle are brisk and triphasic.  Ankle-brachial index is normal.  Left.  Doppler waveforms of the common femoral artery are of high amplitude and  triphasic.  Doppler waveforms at the ankle are brisk and triphasic.  Ankle-brachial index is normal.  Unable to obtained popliteal waveforms due to wound bandages.  Additional testing was not performed in accordance with lower extremity  arterial evaluation protocol.  Clover Maya  (Electronically Signed)   Final Date:      02 September 2020                   11:14    Lab Values:    Lab Results   Component Value Date/Time    WBC 6.5 11/22/2020 02:44 AM    RBC 3.54 (L) 11/22/2020 02:44 AM    HEMOGLOBIN 9.9 (L) 11/22/2020 02:44 AM    HEMATOCRIT 30.5 (L) 11/22/2020 02:44 AM    CREACTPROT 1.07 (H) 08/12/2020 01:55 PM    SEDRATEWES 85 (H) 08/07/2020 01:20 PM    HBA1C 5.7 (H) 11/09/2018 06:30 PM        Culture Results show:  Recent Results (from the past 720 hour(s))   CULTURE WOUND W/ GRAM STAIN    Collection Time: 09/01/20  2:00 PM    Specimen: Left Leg; Wound   Result Value Ref Range    Significant Indicator POS (POS)     Source WND     Site LEFT LEG     Culture Result - (A)     Gram Stain Result       Moderate Gram positive cocci.  Moderate Gram positive rods.      Culture Result (A)      Beta Hemolytic Streptococcus group A  Moderate growth      Culture Result (A)      Methicillin Resistant Staphylococcus aureus  Moderate growth      Culture Result (A)      Diphtheroids  Moderate growth  Mixed morphologies.         INTERVENTIONS BY WOUND TEAM: Dressings removed, cleansed wound bed with normal saline. Meredith-wound skin protected with benzoin and dermatac drape. Nystatin and silver powder to wound bed, black foam to all of wound bed, secured with regular drape. TRAC pad placed. NPWT resumed, no leaks noted. 2 layer coflex compression to left LE.    Interdisciplinary consultation: Patient, Bedside RN    EVALUATION / RATIONALE FOR TREATMENT:     1/15/21: position of leg may affect posterior thigh measurements.  Medial Leg wound area decreased.    01/10/2021: Wound vac replaced, patient accidentally removed vac and bedside RN was unable to repair.  Patient tolerated wound VAC placement. Patient had moderate ss with some green tinge drainage.   01/04/2021: 2 layer compression wrap re-applied to left LE due to ACE wrap leaving too much indentation to left LE.   12/31/2020 thigh wound much narrower.  Biofilm redeveloping and odor  still present.   12/27/20: Wound bed granular and odor has lessen but still persists.   12/18/20 wound length decreased.  Odor persists.    12/14/2020: re-cultured wound bed.  12/11/2020 POD 23 Length of wounds has decreased, width has not changed.  Odor persists.  Pt continues to become angry with VAC when it limits his mobility.    12/7/2020 POD 19 odor persists, improved wound bed after debridement.  Possible bacterial vs yeast vs fungal infection.  Culture taken.  Nystatin to treat possible fungal infection, silver foam to decrease microbial population.    12/4/2020 POD# 16 odor worsening, more yellow tissue present, will add nystatin and silver foam next week and consider a culture  11/30 POD#11 granular buds visible to wound bed.  Same as prior.   11/27 POD#8 wound is odorous likely related to biologic dressing.  No overt signs of infection.  Granular buds are visible through adaptic.  Edges do not appear as thick as they were prior to last sx.  Continue with current treatment.    11/23: POD#4 s/p I&D of left LE wounds and biologic placed by Dr. Olvera on 11/19.  Wound bed is flatter and raised edges scar tissue seems to be gone.   11/14 Posterior thigh aspect of wound deteriorating, will increase frequency of treatment to keep biofilm down and hopefully avoid systemic abx.  Will include thigh in compression wrap.  Will consider culture if improvement is not seen in the next few treatments.    11/10: APRN unavailable at this time.  Will re-schedule excisional debridement to next week.   11/5: Plan for debridement of scarred edges on Tues by LPS. Tendon exposed to posterior aspect of wound, behind knee. Continue with current dressing care.  10/29: Excisional debridement by Asaf ELLER of scar tissue along the proximal edge of the posterior knee and lateral thigh.  Lateral aspect of thigh is flatten.  Medial aspect of knee has thick scar tissue that was excisionally debrided by Asaf ELLER.  Fully  granulated throughout the wound bed.   10/23 wound bed mostly granular, superficial in depth, progress has been slow with the wound VAC so will trial collagen product to encourage new tissue growth.    10/19: wound bed has improved in color and is fully granulated.  Addison-wound has improved, denudation has resolved. APRN continuing with serial weekly debridements as needed.   10/16: wound friable pt now on iv abx per ID.  Indurated edges addressed with drape and foam.  Addison wound likely has yeast infection - addressing with silver powder crusting  10/14: patient seen with Asaf ELLER, stopping veraflo instillation.  Starting silver powder and regular wound VAC to see if it will help with bioburden.  Possible colonization of bacteria.  ID involved.   10/12: Inflammation noted to the proximal part of the wound bed.  Will continue to monitor, possible vac break on Wednesday to help addison-skin inflammation.   10/7: Excisional debridement performed by Asaf ELLER. Will need to continue selective debridement with NPWT changes, and weekly excisional debridement with APRN to flatten out the scar tissue.  IV abx therapy ended 10/5.   10/5: Lateral wound still with some slough, both wounds smaller per measurements. Medial wound with all granular tissue.  9/30: Patient to start abx therapy for 7 days course per Dr. Ellis.  Started dakin's instillation to veraflo  9/28: malodorous today, culture taken.    9/25: Odor noted, though unclear if from wound or pt, consider shower before next Vac change. Consider regular vac next change, wound granular and superficial.   9/23: Patient still awaiting guardianship for placement.   9/18: wound granulating nicely and responding well to current treatment.    9/16: Wound bed with granular tissue, edges are thick but appear better than previous assessments. . NPWT VF to encourage closure by secondary intention and manage drainage while encouraging oxygenation and granulation  to the wound bed. 2 layer compression to assist in decrease of swelling and encourage blood flow to wounds. Wound team to follow up and change 3x/week.      Goals: Steady decrease in wound area and depth weekly.    NURSING PLAN OF CARE ORDERS (X):    Dressing changes: See Dressing Care orders: X  Skin care: See Skin Care orders:   Rectal tube care: See Rectal Tube Care orders:   Other orders:      WOUND TEAM PLAN OF CARE:   Dressing changes by wound team:        Follow up 3 times weekly:                NPWT change 3 times weekly:  X,  NPWT changes 3x/ weekly with ace wrap.  Follow up 1-2 times weekly:      Follow up Bi-Monthly:                   Follow up as needed:       Other (explain):     Anticipated discharge plans:  LTACH:        SNF/Rehab: X - patient will need ongoing wound care for LLE upon discharge - 3x/weekly           Home Health Care:           Outpatient Wound Center:            Self Care:

## 2021-01-21 PROBLEM — F17.200 TOBACCO DEPENDENCE: Status: RESOLVED | Noted: 2017-06-08 | Resolved: 2021-01-21

## 2021-01-21 PROBLEM — F10.20 ALCOHOL DEPENDENCE (HCC): Status: RESOLVED | Noted: 2020-08-08 | Resolved: 2021-01-21

## 2021-01-21 PROCEDURE — A9270 NON-COVERED ITEM OR SERVICE: HCPCS | Performed by: NURSE PRACTITIONER

## 2021-01-21 PROCEDURE — 770001 HCHG ROOM/CARE - MED/SURG/GYN PRIV*

## 2021-01-21 PROCEDURE — 700102 HCHG RX REV CODE 250 W/ 637 OVERRIDE(OP): Performed by: NURSE PRACTITIONER

## 2021-01-21 PROCEDURE — 700111 HCHG RX REV CODE 636 W/ 250 OVERRIDE (IP): Performed by: NURSE PRACTITIONER

## 2021-01-21 PROCEDURE — 99231 SBSQ HOSP IP/OBS SF/LOW 25: CPT | Performed by: NURSE PRACTITIONER

## 2021-01-21 RX ADMIN — DIVALPROEX SODIUM 125 MG: 125 CAPSULE ORAL at 12:01

## 2021-01-21 RX ADMIN — CYCLOBENZAPRINE 10 MG: 10 TABLET, FILM COATED ORAL at 12:01

## 2021-01-21 RX ADMIN — GABAPENTIN 200 MG: 100 CAPSULE ORAL at 17:23

## 2021-01-21 RX ADMIN — ENOXAPARIN SODIUM 40 MG: 40 INJECTION SUBCUTANEOUS at 05:22

## 2021-01-21 RX ADMIN — DIVALPROEX SODIUM 125 MG: 125 CAPSULE ORAL at 05:22

## 2021-01-21 RX ADMIN — DIVALPROEX SODIUM 125 MG: 125 CAPSULE ORAL at 20:39

## 2021-01-21 RX ADMIN — HYDROXYZINE HYDROCHLORIDE 25 MG: 50 TABLET, FILM COATED ORAL at 12:01

## 2021-01-21 RX ADMIN — AMLODIPINE BESYLATE 5 MG: 5 TABLET ORAL at 05:21

## 2021-01-21 RX ADMIN — OXYCODONE HYDROCHLORIDE 10 MG: 10 TABLET ORAL at 20:39

## 2021-01-21 RX ADMIN — GABAPENTIN 200 MG: 100 CAPSULE ORAL at 05:22

## 2021-01-21 RX ADMIN — GABAPENTIN 200 MG: 100 CAPSULE ORAL at 12:01

## 2021-01-21 RX ADMIN — RISPERIDONE 0.5 MG: 0.5 TABLET ORAL at 05:22

## 2021-01-21 RX ADMIN — RISPERIDONE 1 MG: 1 TABLET ORAL at 17:23

## 2021-01-21 RX ADMIN — OXYCODONE HYDROCHLORIDE 10 MG: 10 TABLET ORAL at 05:22

## 2021-01-21 ASSESSMENT — ENCOUNTER SYMPTOMS
FEVER: 0
NERVOUS/ANXIOUS: 1
SPEECH CHANGE: 0
ABDOMINAL PAIN: 0
PALPITATIONS: 0
FALLS: 0
DIARRHEA: 0
COUGH: 0
HEADACHES: 0
INSOMNIA: 0
FOCAL WEAKNESS: 0
DIZZINESS: 0
CHILLS: 0
NAUSEA: 0
SHORTNESS OF BREATH: 0
VOMITING: 0
SENSORY CHANGE: 0
WEAKNESS: 0
CONSTIPATION: 0
DEPRESSION: 1

## 2021-01-21 ASSESSMENT — PAIN DESCRIPTION - PAIN TYPE
TYPE: CHRONIC PAIN

## 2021-01-21 NOTE — CARE PLAN
Problem: Safety  Goal: Will remain free from falls  Description: Pt mobilizes frequently independently.   Outcome: PROGRESSING AS EXPECTED  Note: Hourly rounding.  Non-skid socks. Bed locked & in low position. Personal belongings and call light  within reach. .      Problem: Knowledge Deficit  Goal: Knowledge of disease process/condition, treatment plan, diagnostic tests, and medications will improve  Outcome: PROGRESSING AS EXPECTED  Note: Information regarding disease process/condition explained,question answered.  Updated with plan of care, verbalized understanding.      Problem: Pain Management  Goal: Pain level will decrease to patient's comfort goal  Outcome: PROGRESSING AS EXPECTED  Note: Taught pt 0-10 pain scale and  non pharmacological method of pain mgt, encouraged to verbalize when in pain. Administered PRN pain med as needed.      Problem: Psychosocial Needs:  Goal: Level of anxiety will decrease  Outcome: PROGRESSING AS EXPECTED  Note: Allowed patient to talk about his feelings, educated on deep breathing techniques to cope with the anxiety. Anxiety PRN med given.

## 2021-01-21 NOTE — PROGRESS NOTES
Hospital Medicine Daily Progress Note    Date of Service  1/21/2021    Chief Complaint  Wound Infection    Hospital Course  Mr. Varela is a 65-year-old male with PMH alcohol dependence, tobacco dependence, psychiatric disorder, and HTN who presented to the emergency department 8/7/2020 with a left lower extremity wound infection. He was hospitalized at our facility in April and evaluated by orthopedic surgery who recommended wound care. Wound cultures at that time grew MSSA and Streptococcus. He had been seen at [facility] ([place] earlier that week and prescribed antibiotics, however he had not filled the prescription.  An ultrasound of that extremity was done and was negative for DVT.  He was treated for sepsis and completed an antibiotic course on 9/16/2020. He was also found to have head lice and underwent treatment for that.  A repeat wound culture was done on 9/28/2020 and grew Strep A and Proteus. Infectious disease was consulted and recommended a 5-day course of IV zosyn which was completed on 10/5/2020. On 10/12/2020 the wound care team noticed increased inflammation to the proximal part of the wound bed and switched from a vera flow wound VAC to a regular wound VAC.  ID was again consulted and on 10/14/2020 recommended to 7-day course of antibiotics with meropenem which was completed on 10/22/2020. Additionally, he was noted to have intermittent agitation so was started on risperdal, depakote, and gabapentin per psychiatric recommendations.  On 11/20/2020 he underwent left lower extremity debridement with wound VAC placement. Since then he has remained stable.  He has been deemed incapacitated to make medical decisions and is currently pending guardianship (which he is contesting) and placement, likely to a group home.     His court date is now scheduled for 1/29/2021 at 10 a.m.        Interval Problem Update  -dressing changed 1/20. He continues to have intermittent episodes of inadvertently removing his  wound vac despite education. Updated pictures reviewed - wound looks improved from my last assessment.     Consultants/Specialty  -LPS  -Psychiatry  -Infectious Disease    Code Status  Full Code    Disposition  Pending guardianship hearing 1/29/21.    Review of Systems  Review of Systems   Constitutional: Negative for chills, fever and malaise/fatigue.   Respiratory: Negative for cough and shortness of breath.    Cardiovascular: Positive for leg swelling (improving). Negative for chest pain and palpitations.   Gastrointestinal: Negative for abdominal pain, constipation, diarrhea, nausea and vomiting.   Genitourinary: Negative for dysuria, frequency and urgency.   Musculoskeletal: Negative for falls.   Skin: Negative for itching.   Neurological: Negative for dizziness, sensory change, speech change, focal weakness, weakness and headaches.   Psychiatric/Behavioral: Positive for depression (at times). Negative for suicidal ideas. The patient is nervous/anxious (at times). The patient does not have insomnia.    All other systems reviewed and are negative.       Physical Exam  Temp:  [35.9 °C (96.7 °F)-37.2 °C (98.9 °F)] 37 °C (98.6 °F)  Pulse:  [] 108  Resp:  [16-18] 18  BP: (102-115)/(69-76) 108/75  SpO2:  [92 %-99 %] 92 %    Physical Exam  Vitals signs and nursing note reviewed. Exam conducted with a chaperone present.   Constitutional:       General: He is awake. He is not in acute distress.     Appearance: He is underweight. He is ill-appearing. He is not toxic-appearing or diaphoretic.   HENT:      Head: Normocephalic and atraumatic.      Nose: Nose normal.      Mouth/Throat:      Lips: Pink.      Mouth: Mucous membranes are moist.   Eyes:      General: No scleral icterus.     Conjunctiva/sclera: Conjunctivae normal.   Neck:      Musculoskeletal: Normal range of motion.   Cardiovascular:      Rate and Rhythm: Normal rate.   Pulmonary:      Effort: Pulmonary effort is normal.   Abdominal:      General: There  is no distension.      Tenderness: There is no abdominal tenderness.   Musculoskeletal:      Right lower leg: No edema.      Left lower leg: No edema.      Comments: Ambulates.   Skin:     General: Skin is warm and dry.      Comments: Wound Vac intact/functioning appropriately   Neurological:      Mental Status: He is alert.      Coordination: Coordination is intact.      Gait: Gait is intact. Gait normal.      Comments: Oriented to himself and hospital   Psychiatric:         Mood and Affect: Mood normal. Affect is labile.         Behavior: Behavior is not aggressive or combative. Behavior is cooperative.         Cognition and Memory: Cognition is impaired. He exhibits impaired recent memory.         Judgment: Judgment is impulsive.      Comments:            Fluids    Intake/Output Summary (Last 24 hours) at 1/21/2021 1359  Last data filed at 1/20/2021 1955  Gross per 24 hour   Intake 480 ml   Output --   Net 480 ml       Laboratory                        Imaging  IR-US GUIDED PIV   Final Result    Ultrasound-guided PERIPHERAL IV INSERTION performed by    qualified nursing staff as above.      IR-MIDLINE CATHETER INSERTION WO GUIDANCE > AGE 3   Final Result                  Ultrasound-guided midline placement performed by qualified nursing staff    as above.          IR-US GUIDED PIV   Final Result    Ultrasound-guided PERIPHERAL IV INSERTION performed by    qualified nursing staff as above.      IR-MIDLINE CATHETER INSERTION WO GUIDANCE > AGE 3   Final Result                  Ultrasound-guided midline placement performed by qualified nursing staff    as above.          US-KOSTAS SINGLE LEVEL BILAT   Final Result      CT-HEAD W/O   Final Result      No acute intracranial abnormality      DX-TIBIA AND FIBULA LEFT   Final Result      1.  Healed distal metaphyseal fractures of the left fibula and tibia with tibial IM layla in place.      2.  No acute fracture or dislocation.      3.  No radiopaque soft tissue foreign body.       DX-FEMUR-2+ LEFT   Final Result      1.  No radiographic evidence of acute traumatic injury left femur.      2.  Unchanged cortical thickening in the posterior aspect of the distal femoral diaphysis which may represent sequela of prior injury or infection.      3.  No soft tissue foreign body identified.      US-EXTREMITY VENOUS LOWER UNILAT LEFT   Final Result      DX-CHEST-PORTABLE (1 VIEW)   Final Result      No acute cardiopulmonary abnormality.           Assessment/Plan  * Wound of left leg- (present on admission)  Assessment & Plan  -Wound vac - he intermittently dislodges. Requires ongoing education  -Further management per wound care/LPS/ortho.   -Pain control as needed.     Encephalopathy- (present on admission)  Assessment & Plan  -Improved   -Per psychiatry and Dr. Velázquez on 1/11: Patient is incapacitated.  -Guardianship and placement pending.  Patient is contesting guardianship, hearing is now scheduled for 1/29/2021 at 10 AM.  -Repeat cognitive evaluation to be preformed the week of hearing    Psychiatric disorder- (present on admission)  Assessment & Plan  -Unspecified.  -Continue Risperdal and Depakote.  -Psychiatry has consulted and deemed him incapacitated to leave AMA or make medical decisions.  -Pending guardianship, which he is contesting.  -Last cognitive evaluation 8/20.  Court requesting repeat cognitive evaluation prior to hearing at the end of January.  Patient's medical condition has significantly improved since prior cognitive evaluation.      Essential hypertension- (present on admission)  Assessment & Plan  -Well controlled on amlodipine.    Flexion contracture of knee, 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)  Assessment & Plan  -Secondary to chronic wound in that area.  -PT/OT.  -Does not seem to limit his mobility.  Is frequently ambulatory.    Infection of wound due to methicillin resistant Staphylococcus aureus (MRSA)- (present on admission)  Assessment & Plan  -History of MRSA.  -Poor  compliance with OP wound care. Poor compliance with wound vac at times.   -Continue pain control as needed.  -LPS and wound care following - debridements and wound VAC changes per their recommendations  -Cultures repeated 12/14 - positive for Proteus species, pan sensitive.  Completed regimen of augmentin.     Emphysema/COPD (HCC)- (present on admission)  Assessment & Plan  -Chronic and stable off medication, without acute exacerbation.    Protein malnutrition (HCC)- (present on admission)  Assessment & Plan  -Body mass index is 21.35 kg/m².   -Continue TID supplements.  -Encourage PO intake.       VTE prophylaxis: Ambulatory     I have performed a physical exam and reviewed and updated ROS and Assessment/Plan today (1/21/21). In review of the previous note there are no changes except as documented above    I certify the patient requires continued medically necessary hospital services for the treatment of non-healing.  The patient will remain in the hospital for the foreseeable future.  Discharge may or may not occur in the next 20 days due to ongoing discharge delays due to having no medically acceptable discharge options.    Please note that this dictation was created using voice recognition software. I have made every reasonable attempt to correct obvious errors, but there may be errors of grammar and possibly content that I did not discover before finalizing the note.    LOUISE Khan.

## 2021-01-21 NOTE — CARE PLAN
Problem: Infection  Goal: Will remain free from infection  Outcome: PROGRESSING AS EXPECTED   Pt on wound vac to Left LE. Follow by wound team.   Problem: Pain Management  Goal: Pain level will decrease to patient's comfort goal  Outcome: PROGRESSING AS EXPECTED   Pain well managed on oral medications.

## 2021-01-22 PROCEDURE — 700101 HCHG RX REV CODE 250: Performed by: NURSE PRACTITIONER

## 2021-01-22 PROCEDURE — 700102 HCHG RX REV CODE 250 W/ 637 OVERRIDE(OP): Performed by: NURSE PRACTITIONER

## 2021-01-22 PROCEDURE — A9270 NON-COVERED ITEM OR SERVICE: HCPCS | Performed by: NURSE PRACTITIONER

## 2021-01-22 PROCEDURE — 99231 SBSQ HOSP IP/OBS SF/LOW 25: CPT | Mod: 25 | Performed by: NURSE PRACTITIONER

## 2021-01-22 PROCEDURE — 700111 HCHG RX REV CODE 636 W/ 250 OVERRIDE (IP): Performed by: NURSE PRACTITIONER

## 2021-01-22 PROCEDURE — 11042 DBRDMT SUBQ TIS 1ST 20SQCM/<: CPT | Performed by: NURSE PRACTITIONER

## 2021-01-22 PROCEDURE — 770001 HCHG ROOM/CARE - MED/SURG/GYN PRIV*

## 2021-01-22 PROCEDURE — 11045 DBRDMT SUBQ TISS EACH ADDL: CPT | Performed by: NURSE PRACTITIONER

## 2021-01-22 RX ADMIN — HYDROXYZINE HYDROCHLORIDE 25 MG: 50 TABLET, FILM COATED ORAL at 17:56

## 2021-01-22 RX ADMIN — OXYCODONE HYDROCHLORIDE 10 MG: 10 TABLET ORAL at 21:30

## 2021-01-22 RX ADMIN — RISPERIDONE 1 MG: 1 TABLET ORAL at 17:56

## 2021-01-22 RX ADMIN — ENOXAPARIN SODIUM 40 MG: 40 INJECTION SUBCUTANEOUS at 05:39

## 2021-01-22 RX ADMIN — LIDOCAINE 1 PATCH: 50 PATCH CUTANEOUS at 12:18

## 2021-01-22 RX ADMIN — OXYCODONE HYDROCHLORIDE 10 MG: 10 TABLET ORAL at 15:07

## 2021-01-22 RX ADMIN — GABAPENTIN 200 MG: 100 CAPSULE ORAL at 05:38

## 2021-01-22 RX ADMIN — RISPERIDONE 0.5 MG: 0.5 TABLET ORAL at 05:39

## 2021-01-22 RX ADMIN — AMLODIPINE BESYLATE 5 MG: 5 TABLET ORAL at 05:39

## 2021-01-22 RX ADMIN — DIVALPROEX SODIUM 125 MG: 125 CAPSULE ORAL at 05:39

## 2021-01-22 RX ADMIN — DIVALPROEX SODIUM 125 MG: 125 CAPSULE ORAL at 21:00

## 2021-01-22 RX ADMIN — GABAPENTIN 200 MG: 100 CAPSULE ORAL at 17:56

## 2021-01-22 RX ADMIN — GABAPENTIN 200 MG: 100 CAPSULE ORAL at 12:19

## 2021-01-22 RX ADMIN — DIVALPROEX SODIUM 125 MG: 125 CAPSULE ORAL at 15:04

## 2021-01-22 RX ADMIN — CYCLOBENZAPRINE 10 MG: 10 TABLET, FILM COATED ORAL at 15:07

## 2021-01-22 ASSESSMENT — PAIN DESCRIPTION - PAIN TYPE
TYPE: CHRONIC PAIN

## 2021-01-22 NOTE — CARE PLAN
Problem: Pain Management  Goal: Pain level will decrease to patient's comfort goal  Outcome: PROGRESSING AS EXPECTED   Pain is well managed on oral medications.   Problem: Psychosocial Needs:  Goal: Level of anxiety will decrease  Outcome: PROGRESSING AS EXPECTED   Encourage pt to voice feelings. Reorient patient to surrounding and situation.

## 2021-01-22 NOTE — CARE PLAN
Problem: Safety  Goal: Will remain free from falls  Description: Pt mobilizes frequently independently.   Outcome: PROGRESSING AS EXPECTED  Note: Hourly rounding.  Non-skid socks. Bed locked & in low position. Personal belongings and call light  within reach. .      Problem: Discharge Barriers/Planning  Goal: Patient's continuum of care needs will be met  Outcome: PROGRESSING AS EXPECTED  Note: Collaborate with transitional Care team and interdisciplinary team to meet discharge needs      Problem: Psychosocial Needs:  Goal: Level of anxiety will decrease  Outcome: PROGRESSING AS EXPECTED  Note: Allowed patient to talk about her/his feelings, educated on deep breathing techniques to cope with the anxiety. Anxiety PRN med given.

## 2021-01-22 NOTE — PROGRESS NOTES
"WOUND CARE PROVIDER PROGRESS NOTE        HPI: 65-year-old male homelessness, EtOH use, tobacco dependence, chronic left lower extremity wound admitted 8/7/2020 with left lower extremity wound infection.  Patient was recently hospitalized at [facility] in April 2020, evaluated by orthopedic surgery who recommended wound care.  Wound culture grew MSSA and strep.  Patient was recently seen at [facility] prior to this admission was given a prescription for antibiotics but did not fill this.  Patient reports that he has had this wound for 8 to 10 years.  He reports that he fell and went \"ass over tea kettle\".  He reports that he would clean the wound almost daily with soap and water at the homeless shelter.  Per chart review, patient reported he developed the ulcer in May 2018 when he fell while hiking.  Patient was seen at wound care clinic in August 2018.  Patient had a  assisting him while he was living at [facility].  Wound VAC /was ordered however  had concerns with patient's compliancy and/ access to medical care.  Skilled nursing facility was contacted to have patient be admitted, however he was not accepted due to Medicaid.    Not on any blood thinners.  Patient ambulatory in [place].  Allergies reviewed.  He denies any allergies.    Surgery date: 11/19/2020 by Dr. Olvera  Procedure:1.  Irrigation and debridement of multiple compartments of the left leg with 2   separate 16 cm x 5 cm superficial ulcerations in posterior knee and calf.  2.  AmnioFix biologic sheet placement, left leg.  3.  Wound VAC placement, left leg, 16x5 + 16x5 cm.          Interval hx:   9/11/2020: pt calm cooperative. On zyvox. Seen during VAC and two layer compression wrap change. Pt tolerating VAC and wraps. Wound decreased in size.   9/18/2020: Patient denies any fevers, chills, nausea, vomiting.  He is tolerating the veraflo wound VAC and 2 layer compression wrap.  Wound is decreasing in size.  Increased " granular tissue noted, wound edges have improved however he does have rolled edges to popliteal fossa area.  Patient calm and cooperative, watching sports.  9/30/2020: Denies fevers, chills, nausea, vomiting.  Tolerating wound VAC and 2 layer compression wrap.  Refused nail care.  Wound culture positive for strep A and Proteus.  10/7/2020: completed 5 day course of zyvox. Denies fevers chills nausea vomiting.  Seen during veraflo VAC and 2 layer compression wrap change.    10/14/2020: Patient denies fevers, chills, nausea, vomiting.  Patient wound is malodorous complaining of increased pain to the wound.  Increased slough noted to wound bed despite veraflo wound VAC.  Reconsult ID.  10/16/2020: Patient seen during wound VAC change with [facility] wound care PT. patient denies fever, chills, nausea, vomiting.  Patient reports pain to wound and increase in pain with wound VAC change.  Patient has granular tissue throughout wound with mild amount of slough to posterior aspect of leg.  Malodorous with VAC removal.  Wound VAC nursing changed.  10/29/2020: Wound VAC was discontinued by wound team on 10/23/2020 due to slow progress and collagen was started.  Patient has completed 7-day antibiotic course of meropenem for multidrug resistant Proteus vulgaris.  Patient found to have lice and has been treated.  Nonfoul-smelling odor present with dressing removal.  11/5/2020: Seen with wound team during 2 layer compression dressing change and for excisional debridement.  No odor noted today.  Thick layer of biofilm noted.  Wound edges continue to improve but still remain to medial aspect of wound.  11/17/2020: Seen with wound team during dressing and 2 layer compression wrap change.  Patient with severely thick scar tissue to wound edges.  Excisional debridement with injectable lidocaine and epinephrine performed today.  Patient denies any fevers, chills, nausea, vomiting.  11/23/2020: POD #4 SP left leg I&D with biologic and VAC  placement.  VAC functioning.  Foul odor coming from wound.  11/30/2020: POD #11.  Patient tired of walking around with wound VAC machine.  But agreeable to continue with wound VAC.  Mild odor coming from wound with wound VAC removal.  12/7/2020: POD #18.  Seen during wound VAC change.  Sitter no longer at bedside.  12/14/2020 POD # 25.  Seen during wound VAC change with wound team.  Previous wound culture from last week showed organisms but they were not worked up.  New wound culture to be taken today.  Odor remains slightly diminished.  Drainage heavy, slightly decreased from last week.  Continue with nystatin powder and silver foam.  Patient tired today.  12/16/2020: Wound culture 12/14/2020 + for Proteus Mirabilis.  Discussed case with ID, previously following this admit.  Recommended Augmentin for 1 week.  12/17/2020: Patient not drinking boost.  Has stockpile in room.  Dietary consulted.  Started Augmentin yesterday. Contacted micro to review culture.  So far growing Proteus and diphtheroids.  Micro to assess for fungal component.  1/4/2021: Wounds have  into 2 wounds again and are decreasing in size.  No longer doing silver foam but rather Arglaes powder and nystatin powder.  Odor remains.  Completed antibiotics.  Guardianship hearing is 1/29/2021.  Had arginaid supplement 4 times per day for 2 weeks that started on 12/17/2020.  1/22/2021: Seen with wound team for VAC change.  Odor is decreased.  Skin bridges have formed and now patient has 3 smaller wounds.  However there is increased periwound breakdown.  Patient experiencing pain with VAC change to proximal thigh.  Topical lidocaine used.  Wound VAC held for weekend due to periwound breakdown.      Results:  Wound culture left leg 12/14/2020: No fungal growth, Proteus mirabilis  Wound culture left leg 12/7/2020:Light growth mixed organisms.   Covid screen not detected 11/17/2020  CBC 11/22/2020: WBC 6.5, hemoglobin and hematocrit 9.9/30.5.   Platelet count 403.  CBC with differential 10/14/2020: WBC 6, H&H 11/33.9  Wound culture: 9/28/2020: Positive for beta-hemolytic strep group A, Proteus.    Wound cx: 9/1/2020 mrsa, diphtheroids, beta hemolytic strep group A    8/7/2020: X-ray tib-fib left:  1.  Healed distal metaphyseal fractures of the left fibula and tibia with tibial IM layla in place.     2.  No acute fracture or dislocation.     3.  No radiopaque soft tissue foreign body.      Arterial studies:   9/2/2020  Right.    Doppler waveforms of the common femoral artery are of high amplitude and    triphasic.    Doppler waveforms at the ankle are brisk and triphasic.    Ankle-brachial index is normal.       Left.    Doppler waveforms of the common femoral artery are of high amplitude and    triphasic.    Doppler waveforms at the ankle are brisk and triphasic.    Ankle-brachial index is normal.    Unable to obtained popliteal waveforms due to wound bandages.          Exam:    Palpable PT pulse to left foot +1 foot   +2 DP left foot  Difficulty palpating popliteal due to scar tissue  Able to extend left leg to 160 degrees. Able to flex left leg around 80 degrees      Left lower leg full-thickness ulcer  Odor decreased.  Drainage has decreased slightly  Wounds have  to 3 wounds with skin bridge at popliteal fossa and skin bridge to medial leg wound        Lateral ulcer:  Wound has decreased in size  Wound edges flat,  Granular tissue noted, with scattered /slough, biofilm to wound bed  Amnio fix skin substitute  incorporated  No periwound erythema  Increased denudation to periwound  Edema controlled    Medial ulcers:  Wound edges flat  Wound decreased in size  into 2 wounds proximal and distal   granular tissue with scattered slough and biofilm  Amnio fix  incorporated  Edema controlled  Increase denudation to periwound      0.2 x 0.1 laceration to left plantar fifth MTH.  Scant bleeding.  No erythema.  No edema.      Photos  predebridement  left leg medial view from 1/20/2021      Lateral leg                PROCEDURE:   -Curette used to debride wound beds.  Excisional debridement was performed to remove devitalized tissue until healthy, bleeding tissue was visualized.   Entire surface of both wounds to medial side of leg debrided and distal end of lateral ulcer debrided.  Total, 55 cm2 debrided.  Tissue debrided into the subcutaneous layer.    -Bleeding controlled with manual pressure.    -Wound care completed by wound PT, refer to flowsheet.  Aquacel Ag, ABD pad, 2 layer compression wrap..  Aquacel Ag to foot.  -Patient tolerated the procedure well, without c/o pain or discomfort.       Post debridement photo lateral wound            Posterior debridement photo medial wound                          ASSESSMENT/PLAN:  65-year-old male with homelessness, EtOH use, tobacco use, and chronic left leg ulcer.  SP left leg I&D with amnio fix and wound VAC placement by Dr. Olvera on 11/19/2020    -Wound has now  into 3 wounds with skin bridge to popliteal fossa that is increasing in size and a skin bridge to the medial wound  it into proximal and distal wounds.  All wounds have decreased in size.  There is increased periwound denudation.  VAC held for weekend.  -Biofilm present. Excisional debridement performed today.  Medically necessary to promote wound healing.  -Odor remains but is decreased, heavy drainage slightly decreased  -Edema controlled with 2 layer compression wrap.  -Patient has completed 1 week of Augmentin and L-arginine supplement for 2 weeks in December 2020  -No plans for [employer]Cell at this time.      PLAN  -VAC break for weekend.  Nystatin powder to periwound denudation.  Resume wound VAC on Monday 1/25/2020 for 3 times a week.  Use Dermatac drape.    -Continue 3 times a week debridement to wound to reduce biofilm  -Continue Arglaes powder and nystatin powder  -Continue 2 layer compression wrap extending to  thigh  - Patient to continue to be seen by wound care team while admitted  Patient to continue to be followed by wound care nurse practitioner as needed      Discharge plan:  Pending guardianship.  Appointment 1/29/2021.      D/W: Patient, RN, Clary Huang PT case management

## 2021-01-23 PROCEDURE — 770001 HCHG ROOM/CARE - MED/SURG/GYN PRIV*

## 2021-01-23 PROCEDURE — A9270 NON-COVERED ITEM OR SERVICE: HCPCS | Performed by: NURSE PRACTITIONER

## 2021-01-23 PROCEDURE — 700102 HCHG RX REV CODE 250 W/ 637 OVERRIDE(OP): Performed by: NURSE PRACTITIONER

## 2021-01-23 PROCEDURE — 700111 HCHG RX REV CODE 636 W/ 250 OVERRIDE (IP): Performed by: NURSE PRACTITIONER

## 2021-01-23 PROCEDURE — 700101 HCHG RX REV CODE 250: Performed by: NURSE PRACTITIONER

## 2021-01-23 RX ADMIN — CYCLOBENZAPRINE 10 MG: 10 TABLET, FILM COATED ORAL at 14:52

## 2021-01-23 RX ADMIN — ENOXAPARIN SODIUM 40 MG: 40 INJECTION SUBCUTANEOUS at 06:17

## 2021-01-23 RX ADMIN — GABAPENTIN 200 MG: 100 CAPSULE ORAL at 06:16

## 2021-01-23 RX ADMIN — DIVALPROEX SODIUM 125 MG: 125 CAPSULE ORAL at 14:37

## 2021-01-23 RX ADMIN — RISPERIDONE 1 MG: 1 TABLET ORAL at 16:18

## 2021-01-23 RX ADMIN — OXYCODONE HYDROCHLORIDE 10 MG: 10 TABLET ORAL at 11:20

## 2021-01-23 RX ADMIN — RISPERIDONE 0.5 MG: 0.5 TABLET ORAL at 06:16

## 2021-01-23 RX ADMIN — DIVALPROEX SODIUM 125 MG: 125 CAPSULE ORAL at 06:16

## 2021-01-23 RX ADMIN — OXYCODONE HYDROCHLORIDE 10 MG: 10 TABLET ORAL at 21:41

## 2021-01-23 RX ADMIN — AMLODIPINE BESYLATE 5 MG: 5 TABLET ORAL at 06:16

## 2021-01-23 RX ADMIN — LIDOCAINE 1 PATCH: 50 PATCH CUTANEOUS at 11:20

## 2021-01-23 RX ADMIN — DIVALPROEX SODIUM 125 MG: 125 CAPSULE ORAL at 21:41

## 2021-01-23 RX ADMIN — GABAPENTIN 200 MG: 100 CAPSULE ORAL at 11:20

## 2021-01-23 RX ADMIN — GABAPENTIN 200 MG: 100 CAPSULE ORAL at 16:18

## 2021-01-23 ASSESSMENT — PAIN DESCRIPTION - PAIN TYPE
TYPE: CHRONIC PAIN

## 2021-01-23 NOTE — CARE PLAN
Problem: Safety  Goal: Will remain free from falls  Description: Pt mobilizes frequently independently.   Outcome: PROGRESSING AS EXPECTED     Problem: Discharge Barriers/Planning  Goal: Patient's continuum of care needs will be met  Outcome: PROGRESSING SLOWER THAN EXPECTED     Problem: Pain Management  Goal: Pain level will decrease to patient's comfort goal  Outcome: PROGRESSING AS EXPECTED

## 2021-01-24 PROCEDURE — 700102 HCHG RX REV CODE 250 W/ 637 OVERRIDE(OP): Performed by: NURSE PRACTITIONER

## 2021-01-24 PROCEDURE — 770001 HCHG ROOM/CARE - MED/SURG/GYN PRIV*

## 2021-01-24 PROCEDURE — A9270 NON-COVERED ITEM OR SERVICE: HCPCS | Performed by: NURSE PRACTITIONER

## 2021-01-24 PROCEDURE — 700101 HCHG RX REV CODE 250: Performed by: NURSE PRACTITIONER

## 2021-01-24 PROCEDURE — 700111 HCHG RX REV CODE 636 W/ 250 OVERRIDE (IP): Performed by: NURSE PRACTITIONER

## 2021-01-24 RX ADMIN — OXYCODONE HYDROCHLORIDE 10 MG: 10 TABLET ORAL at 23:26

## 2021-01-24 RX ADMIN — OXYCODONE HYDROCHLORIDE 10 MG: 10 TABLET ORAL at 14:29

## 2021-01-24 RX ADMIN — GABAPENTIN 200 MG: 100 CAPSULE ORAL at 17:56

## 2021-01-24 RX ADMIN — DIVALPROEX SODIUM 125 MG: 125 CAPSULE ORAL at 20:06

## 2021-01-24 RX ADMIN — DIVALPROEX SODIUM 125 MG: 125 CAPSULE ORAL at 07:11

## 2021-01-24 RX ADMIN — ENOXAPARIN SODIUM 40 MG: 40 INJECTION SUBCUTANEOUS at 07:11

## 2021-01-24 RX ADMIN — DIVALPROEX SODIUM 125 MG: 125 CAPSULE ORAL at 14:29

## 2021-01-24 RX ADMIN — GABAPENTIN 200 MG: 100 CAPSULE ORAL at 07:11

## 2021-01-24 RX ADMIN — RISPERIDONE 0.5 MG: 0.5 TABLET ORAL at 07:11

## 2021-01-24 RX ADMIN — AMLODIPINE BESYLATE 5 MG: 5 TABLET ORAL at 07:11

## 2021-01-24 RX ADMIN — OXYCODONE HYDROCHLORIDE 10 MG: 10 TABLET ORAL at 07:11

## 2021-01-24 RX ADMIN — CYCLOBENZAPRINE 10 MG: 10 TABLET, FILM COATED ORAL at 17:56

## 2021-01-24 RX ADMIN — CYCLOBENZAPRINE 10 MG: 10 TABLET, FILM COATED ORAL at 07:11

## 2021-01-24 RX ADMIN — RISPERIDONE 1 MG: 1 TABLET ORAL at 17:56

## 2021-01-24 RX ADMIN — GABAPENTIN 200 MG: 100 CAPSULE ORAL at 12:10

## 2021-01-24 RX ADMIN — LIDOCAINE 1 PATCH: 50 PATCH CUTANEOUS at 12:10

## 2021-01-24 ASSESSMENT — PAIN DESCRIPTION - PAIN TYPE
TYPE: CHRONIC PAIN
TYPE: ACUTE PAIN;CHRONIC PAIN
TYPE: ACUTE PAIN;SURGICAL PAIN
TYPE: ACUTE PAIN;CHRONIC PAIN
TYPE: ACUTE PAIN;SURGICAL PAIN
TYPE: ACUTE PAIN;SURGICAL PAIN
TYPE: ACUTE PAIN;CHRONIC PAIN
TYPE: ACUTE PAIN;SURGICAL PAIN

## 2021-01-24 NOTE — CARE PLAN
Problem: Safety  Goal: Will remain free from falls  Description: Pt mobilizes frequently independently.   Outcome: PROGRESSING AS EXPECTED     Problem: Pain Management  Goal: Pain level will decrease to patient's comfort goal  Outcome: PROGRESSING AS EXPECTED  Interventions: Assess pain, provide comfort measures: medication, relaxation, rest, distraction.

## 2021-01-25 PROCEDURE — 700102 HCHG RX REV CODE 250 W/ 637 OVERRIDE(OP): Performed by: NURSE PRACTITIONER

## 2021-01-25 PROCEDURE — 99231 SBSQ HOSP IP/OBS SF/LOW 25: CPT | Performed by: NURSE PRACTITIONER

## 2021-01-25 PROCEDURE — 97598 DBRDMT OPN WND ADDL 20CM/<: CPT

## 2021-01-25 PROCEDURE — A9270 NON-COVERED ITEM OR SERVICE: HCPCS | Performed by: NURSE PRACTITIONER

## 2021-01-25 PROCEDURE — 770001 HCHG ROOM/CARE - MED/SURG/GYN PRIV*

## 2021-01-25 PROCEDURE — 29581 APPL MULTLAYER CMPRN SYS LEG: CPT

## 2021-01-25 PROCEDURE — 700101 HCHG RX REV CODE 250: Performed by: NURSE PRACTITIONER

## 2021-01-25 PROCEDURE — 700111 HCHG RX REV CODE 636 W/ 250 OVERRIDE (IP): Performed by: NURSE PRACTITIONER

## 2021-01-25 RX ORDER — GABAPENTIN 300 MG/1
300 CAPSULE ORAL 3 TIMES DAILY
Status: DISCONTINUED | OUTPATIENT
Start: 2021-01-25 | End: 2021-05-03 | Stop reason: HOSPADM

## 2021-01-25 RX ADMIN — DIVALPROEX SODIUM 125 MG: 125 CAPSULE ORAL at 20:25

## 2021-01-25 RX ADMIN — HYDROXYZINE HYDROCHLORIDE 25 MG: 50 TABLET, FILM COATED ORAL at 02:04

## 2021-01-25 RX ADMIN — ENOXAPARIN SODIUM 40 MG: 40 INJECTION SUBCUTANEOUS at 05:25

## 2021-01-25 RX ADMIN — AMLODIPINE BESYLATE 5 MG: 5 TABLET ORAL at 05:25

## 2021-01-25 RX ADMIN — OXYCODONE HYDROCHLORIDE 10 MG: 10 TABLET ORAL at 20:25

## 2021-01-25 RX ADMIN — RISPERIDONE 1 MG: 1 TABLET ORAL at 17:06

## 2021-01-25 RX ADMIN — DIVALPROEX SODIUM 125 MG: 125 CAPSULE ORAL at 15:57

## 2021-01-25 RX ADMIN — OXYCODONE HYDROCHLORIDE 10 MG: 10 TABLET ORAL at 12:25

## 2021-01-25 RX ADMIN — CYCLOBENZAPRINE 10 MG: 10 TABLET, FILM COATED ORAL at 02:04

## 2021-01-25 RX ADMIN — GABAPENTIN 200 MG: 100 CAPSULE ORAL at 05:25

## 2021-01-25 RX ADMIN — RISPERIDONE 0.5 MG: 0.5 TABLET ORAL at 05:25

## 2021-01-25 RX ADMIN — GABAPENTIN 200 MG: 100 CAPSULE ORAL at 12:22

## 2021-01-25 RX ADMIN — GABAPENTIN 300 MG: 300 CAPSULE ORAL at 17:06

## 2021-01-25 RX ADMIN — HYDROXYZINE HYDROCHLORIDE 25 MG: 50 TABLET, FILM COATED ORAL at 20:25

## 2021-01-25 RX ADMIN — DIVALPROEX SODIUM 125 MG: 125 CAPSULE ORAL at 05:25

## 2021-01-25 RX ADMIN — LIDOCAINE 1 PATCH: 50 PATCH CUTANEOUS at 12:21

## 2021-01-25 ASSESSMENT — ENCOUNTER SYMPTOMS
CHILLS: 0
FEVER: 0
INSOMNIA: 0
WEAKNESS: 0
COUGH: 0
DEPRESSION: 1
CONSTIPATION: 0
VOMITING: 0
NERVOUS/ANXIOUS: 1
DIZZINESS: 0
SENSORY CHANGE: 0
NAUSEA: 0
HEADACHES: 0
DIARRHEA: 0
SHORTNESS OF BREATH: 0
SPEECH CHANGE: 0
FALLS: 0
FOCAL WEAKNESS: 0
ABDOMINAL PAIN: 0
PALPITATIONS: 0

## 2021-01-25 ASSESSMENT — PAIN DESCRIPTION - PAIN TYPE
TYPE: ACUTE PAIN;CHRONIC PAIN
TYPE: ACUTE PAIN
TYPE: ACUTE PAIN;CHRONIC PAIN
TYPE: ACUTE PAIN;CHRONIC PAIN

## 2021-01-25 NOTE — PROGRESS NOTES
Hospital Medicine Daily Progress Note    Date of Service  1/25/2021    Chief Complaint  Wound Infection    Hospital Course  Mr. Varela is a 65-year-old male with PMH alcohol dependence, tobacco dependence, psychiatric disorder, and HTN who presented to the emergency department 8/7/2020 with a left lower extremity wound infection. He was hospitalized at our facility in April and evaluated by orthopedic surgery who recommended wound care. Wound cultures at that time grew MSSA and Streptococcus. He had been seen at [facility] earlier that week and prescribed antibiotics, however he had not filled the prescription.  An ultrasound of that extremity was done and was negative for DVT.  He was treated for sepsis and completed an antibiotic course on 9/16/2020. He was also found to have head lice and underwent treatment for that.  A repeat wound culture was done on 9/28/2020 and grew Strep A and Proteus. Infectious disease was consulted and recommended a 5-day course of IV zosyn which was completed on 10/5/2020. On 10/12/2020 the wound care team noticed increased inflammation to the proximal part of the wound bed and switched from a vera flow wound VAC to a regular wound VAC.  ID was again consulted and on 10/14/2020 recommended to 7-day course of antibiotics with meropenem which was completed on 10/22/2020. Additionally, he was noted to have intermittent agitation so was started on risperdal, depakote, and gabapentin per psychiatric recommendations.  On 11/20/2020 he underwent left lower extremity debridement with wound VAC placement. Since then he has remained stable.  He has been deemed incapacitated to make medical decisions and is currently pending guardianship (which he is contesting) and placement, likely to a group home.     His court date is now scheduled for 1/29/2021 at 10 a.m.      Interval Problem Update  1/25 - no acute events overnight. He continues to complain of pain, especially during dressing  changes. Using PRN oxycodone appropriately. Will need to consider weaning soon if court lifts his guardianship and he is able to DC. Increased Gabapentin today.  1/21 - dressing changed 1/20. He continues to have intermittent episodes of inadvertently removing his wound vac despite education. Updated pictures reviewed - wound looks improved from my last assessment.     Consultants/Specialty  -LPS  -Psychiatry  -Infectious Disease    Code Status  Full Code    Disposition  Pending guardianship hearing 1/29/21.    Review of Systems  Review of Systems   Constitutional: Negative for chills, fever and malaise/fatigue.   Respiratory: Negative for cough and shortness of breath.    Cardiovascular: Positive for leg swelling (improving). Negative for chest pain and palpitations.   Gastrointestinal: Negative for abdominal pain, constipation, diarrhea, nausea and vomiting.   Genitourinary: Negative for dysuria, frequency and urgency.   Musculoskeletal: Negative for falls.   Skin: Negative for itching.   Neurological: Negative for dizziness, sensory change, speech change, focal weakness, weakness and headaches.   Psychiatric/Behavioral: Positive for depression (at times). Negative for suicidal ideas. The patient is nervous/anxious (at times). The patient does not have insomnia.    All other systems reviewed and are negative.       Physical Exam  Temp:  [36.4 °C (97.6 °F)-36.5 °C (97.7 °F)] 36.4 °C (97.6 °F)  Pulse:  [80-86] 80  Resp:  [16] 16  BP: (113-122)/(74-83) 122/74  SpO2:  [93 %] 93 %    Physical Exam  Vitals signs and nursing note reviewed. Exam conducted with a chaperone present.   Constitutional:       General: He is awake. He is not in acute distress.     Appearance: He is underweight. He is ill-appearing. He is not toxic-appearing or diaphoretic.   HENT:      Head: Normocephalic and atraumatic.      Nose: Nose normal.      Mouth/Throat:      Lips: Pink.      Mouth: Mucous membranes are moist.   Eyes:      General: No  scleral icterus.     Conjunctiva/sclera: Conjunctivae normal.   Neck:      Musculoskeletal: Normal range of motion.   Cardiovascular:      Rate and Rhythm: Normal rate.   Pulmonary:      Effort: Pulmonary effort is normal.   Abdominal:      General: There is no distension.      Tenderness: There is no abdominal tenderness.   Musculoskeletal:      Right lower leg: No edema.      Left lower leg: No edema.      Comments: Ambulates.   Skin:     General: Skin is warm and dry.      Comments: Wound Vac intact/functioning appropriately   Neurological:      Mental Status: He is alert.      Coordination: Coordination is intact.      Gait: Gait is intact. Gait normal.      Comments: Oriented to himself and hospital   Psychiatric:         Mood and Affect: Mood normal. Affect is labile.         Behavior: Behavior is not aggressive or combative. Behavior is cooperative.         Cognition and Memory: Cognition is impaired. He exhibits impaired recent memory.         Judgment: Judgment is impulsive.      Comments:            Fluids    Intake/Output Summary (Last 24 hours) at 1/25/2021 1313  Last data filed at 1/24/2021 1800  Gross per 24 hour   Intake 1000 ml   Output --   Net 1000 ml       Laboratory                        Imaging  IR-US GUIDED PIV   Final Result    Ultrasound-guided PERIPHERAL IV INSERTION performed by    qualified nursing staff as above.      IR-MIDLINE CATHETER INSERTION WO GUIDANCE > AGE 3   Final Result                  Ultrasound-guided midline placement performed by qualified nursing staff    as above.          IR-US GUIDED PIV   Final Result    Ultrasound-guided PERIPHERAL IV INSERTION performed by    qualified nursing staff as above.      IR-MIDLINE CATHETER INSERTION WO GUIDANCE > AGE 3   Final Result                  Ultrasound-guided midline placement performed by qualified nursing staff    as above.          US-KOSTAS SINGLE LEVEL BILAT   Final Result      CT-HEAD W/O   Final Result      No acute  intracranial abnormality      DX-TIBIA AND FIBULA LEFT   Final Result      1.  Healed distal metaphyseal fractures of the left fibula and tibia with tibial IM layla in place.      2.  No acute fracture or dislocation.      3.  No radiopaque soft tissue foreign body.      DX-FEMUR-2+ LEFT   Final Result      1.  No radiographic evidence of acute traumatic injury left femur.      2.  Unchanged cortical thickening in the posterior aspect of the distal femoral diaphysis which may represent sequela of prior injury or infection.      3.  No soft tissue foreign body identified.      US-EXTREMITY VENOUS LOWER UNILAT LEFT   Final Result      DX-CHEST-PORTABLE (1 VIEW)   Final Result      No acute cardiopulmonary abnormality.           Assessment/Plan  * Wound of left leg- (present on admission)  Assessment & Plan  -Wound vac - he intermittently dislodges. Requires ongoing education  -Further management per wound care/LPS/ortho.   -Pain control as needed.     Encephalopathy- (present on admission)  Assessment & Plan  -Improved   -Per psychiatry and Dr. Velázquez on 1/11: Patient is incapacitated.  -Guardianship and placement pending.  Patient is contesting guardianship, hearing is now scheduled for 1/29/2021 at 10 AM.  -Repeat cognitive evaluation to be preformed the week of hearing    Psychiatric disorder- (present on admission)  Assessment & Plan  -Unspecified.  -Continue Risperdal and Depakote.  -Psychiatry has consulted and deemed him incapacitated to leave AMA or make medical decisions.  -Pending guardianship, which he is contesting.  -Last cognitive evaluation 8/20.  Court requesting repeat cognitive evaluation prior to hearing at the end of January.  Patient's medical condition has significantly improved since prior cognitive evaluation.      Essential hypertension- (present on admission)  Assessment & Plan  -Well controlled on amlodipine.    Flexion contracture of knee, 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)  Assessment &  Plan  -Secondary to chronic wound in that area.  -PT/OT.  -Does not seem to limit his mobility.  Is frequently ambulatory.    Infection of wound due to methicillin resistant Staphylococcus aureus (MRSA)- (present on admission)  Assessment & Plan  -History of MRSA.  -Poor compliance with OP wound care. Poor compliance with wound vac at times.   -Continue pain control as needed.  -LPS and wound care following - debridements and wound VAC changes per their recommendations  -Cultures repeated 12/14 - positive for Proteus species, pan sensitive.  Completed regimen of augmentin.     Emphysema/COPD (HCC)- (present on admission)  Assessment & Plan  -Chronic and stable off medication, without acute exacerbation.    Protein malnutrition (HCC)- (present on admission)  Assessment & Plan  -Body mass index is 21.35 kg/m².   -Continue TID supplements.  -Encourage PO intake.       VTE prophylaxis: Ambulatory     I have performed a physical exam and reviewed and updated ROS and Assessment/Plan today (01/25/21). In review of the previous note there are no changes except as documented above    I certify the patient requires continued medically necessary hospital services for the treatment of non-healing.  The patient will remain in the hospital for the foreseeable future.  Discharge may or may not occur in the next 20 days due to ongoing discharge delays due to having no medically acceptable discharge options.    Please note that this dictation was created using voice recognition software. I have made every reasonable attempt to correct obvious errors, but there may be errors of grammar and possibly content that I did not discover before finalizing the note.    LOUISE Khan.

## 2021-01-25 NOTE — CARE PLAN
Problem: Pain Management  Goal: Pain level will decrease to patient's comfort goal  Outcome: PROGRESSING AS EXPECTED   Pain is well managed on oral medications. Educate pt on non pharmacological pain management.   Problem: Psychosocial Needs:  Goal: Level of anxiety will decrease  Outcome: PROGRESSING AS EXPECTED   Reorient patient to surroundings and situation. Use distractions to decrease level of anxiety.

## 2021-01-25 NOTE — CARE PLAN
Problem: Safety  Goal: Will remain free from injury  Outcome: PROGRESSING AS EXPECTED   Bed locked and in lowest position. Treaded slipper socks on. Call light within reach. Pt educated to call for assistance.       Problem: Venous Thromboembolism (VTW)/Deep Vein Thrombosis (DVT) Prevention:  Goal: Patient will participate in Venous Thrombosis (VTE)/Deep Vein Thrombosis (DVT)Prevention Measures  Outcome: PROGRESSING AS EXPECTED   Patient ambulates often and receives Lovenox injections

## 2021-01-25 NOTE — WOUND TEAM
[facility]  Inpatient Services  Wound and Skin Care Progress Note    Admission Date: 8/7/2020     Last order of IP CONSULT TO WOUND CARE was found on 9/1/2020 from Hospital Encounter on 8/7/2020     HPI, PMH, SH: Reviewed    Unit where seen by Wound Team: [id number]    WOUND CONSULT/FOLLOW UP RELATED TO:  Left leg follow-up; scheduled npwt dressing change.    Self Report / Pain Level: Pt c/o moderate pain at the proximal posterior thigh; yelling out and raising hand.      OBJECTIVE: NPWT dressing and 2 layer compression wrap in place    WOUND TYPE, LOCATION, CHARACTERISTICS (Pressure Injuries: location, stage, POA or date identified)     Wound 11/19/20 Full Thickness Wound Leg Lateral;Posterior;Medial Left (Active)   Wound Image    01/22/21 0912   Site Assessment Pink;Red;Slough 01/25/21 1600   Periwound Assessment Excoriated;Denuded 01/25/21 1600   Margins Defined edges;Attached edges 01/25/21 1600   Closure Secondary intention 01/25/21 1600   Drainage Amount Moderate 01/25/21 1600   Drainage Description Green;Yellow 01/25/21 1600   Treatments Cleansed;CSWD - Conservative Sharp Wound Debridement;Site care 01/25/21 1600   Wound Cleansing Approved Wound Cleanser 01/25/21 1600   Periwound Protectant Antifungal Therapy;Skin Protectant Wipes to Periwound 01/25/21 1600   Dressing Cleansing/Solutions Not Applicable 01/25/21 1600   Dressing Options Hydrofiber Silver;Absorbent Abdominal Pad;Compression Wrap Two Layer 01/25/21 1600   Dressing Changed Changed 01/25/21 1600   Dressing Status Clean;Dry;Intact 01/25/21 1600   Dressing Change/Treatment Frequency By Wound Team Only 01/25/21 1600   NEXT Dressing Change/Treatment Date 01/27/21 01/25/21 1600   NEXT Weekly Photo (Inpatient Only) 01/27/21 01/25/21 1600   Non-staged Wound Description Full thickness 01/22/21 0912   Wound Bed Granulation (%) 90 % 01/22/21 0912   Wound Bed Slough (%) 10 % 01/22/21 0912   Wound Bed Granulation (%) - Post-Procedure 100 % 01/13/21  1100   Shape linear/irregular 01/25/21 1600   Wound Odor Mild 01/25/21 1600   Pulses Left;2+;DP;PT 01/25/21 1600   Exposed Structures None 01/25/21 1600   WOUND NURSE ONLY - Time Spent with Patient (mins) 75 01/22/21 0912          Wounds are no longer connected - measured as 2 wounds - posterior thigh and medial leg.        VASCULAR    Lab Values:    Lab Results   Component Value Date/Time    WBC 6.5 11/22/2020 02:44 AM    RBC 3.54 (L) 11/22/2020 02:44 AM    HEMOGLOBIN 9.9 (L) 11/22/2020 02:44 AM    HEMATOCRIT 30.5 (L) 11/22/2020 02:44 AM    CREACTPROT 1.07 (H) 08/12/2020 01:55 PM    SEDRATEWES 85 (H) 08/07/2020 01:20 PM    HBA1C 5.7 (H) 11/09/2018 06:30 PM        Culture Results show:  Recent Results (from the past 720 hour(s))   CULTURE WOUND W/ GRAM STAIN    Collection Time: 09/01/20  2:00 PM    Specimen: Left Leg; Wound   Result Value Ref Range    Significant Indicator POS (POS)     Source WND     Site LEFT LEG     Culture Result - (A)     Gram Stain Result       Moderate Gram positive cocci.  Moderate Gram positive rods.      Culture Result (A)      Beta Hemolytic Streptococcus group A  Moderate growth      Culture Result (A)      Methicillin Resistant Staphylococcus aureus  Moderate growth      Culture Result (A)      Diphtheroids  Moderate growth  Mixed morphologies.     KOSTAS: 9/20/21   Left.    Doppler waveforms of the common femoral artery are of high amplitude and    triphasic.    Doppler waveforms at the ankle are brisk and triphasic.    Ankle-brachial index is normal.    Unable to obtained popliteal waveforms due to wound bandages.     Self Report / Pain Level: Pt c/o moderate pain at the most proximal posterior thigh.  Patient pre-medicated prior to removal of dressings.     INTERVENTIONS BY WOUND TEAM: wound care performed per standard procedures  Cleansed with wound cleanser  Debridement - CSWD with currette to remove >20cm2 of non-viable tissue  Periwound - crusted with nystatin and no sting skin  prep  Primary - Aquacel Ag hydrofiber  Secondary - ABDx2, 2 layer compression wrap foot/leg/thigh    Interdisciplinary consultation: Patient, Bedside RN    EVALUATION / RATIONALE FOR TREATMENT:     1/25/21: denuded tissue, vac vacation for until 1/27.  Re-assessment to place wound VAC to left LE.   1/21/21 area has decreased since last measurements.  Odor still present.  Addison skin compromised probably yeast infection under VAC drape.  Will hold VAC for 72 hours and re assess.  Nystatin to address yeast and dry out addison skin  1/15/21: position of leg may affect posterior thigh measurements.  Medial Leg wound area decreased.    01/10/2021: Wound vac replaced, patient accidentally removed vac and bedside RN was unable to repair.  Patient tolerated wound VAC placement. Patient had moderate ss with some green tinge drainage.   01/04/2021: 2 layer compression wrap re-applied to left LE due to ACE wrap leaving too much indentation to left LE.   12/31/2020 thigh wound much narrower.  Biofilm redeveloping and odor still present.   12/27/20: Wound bed granular and odor has lessen but still persists.   12/18/20 wound length decreased.  Odor persists.    12/14/2020: re-cultured wound bed.  12/11/2020 POD 23 Length of wounds has decreased, width has not changed.  Odor persists.  Pt continues to become angry with VAC when it limits his mobility.    12/7/2020 POD 19 odor persists, improved wound bed after debridement.  Possible bacterial vs yeast vs fungal infection.  Culture taken.  Nystatin to treat possible fungal infection, silver foam to decrease microbial population.    12/4/2020 POD# 16 odor worsening, more yellow tissue present, will add nystatin and silver foam next week and consider a culture  11/30 POD#11 granular buds visible to wound bed.  Same as prior.   11/27 POD#8 wound is odorous likely related to biologic dressing.  No overt signs of infection.  Granular buds are visible through adaptic.  Edges do not appear as  thick as they were prior to last sx.  Continue with current treatment.    11/23: POD#4 s/p I&D of left LE wounds and biologic placed by Dr. Olvera on 11/19.  Wound bed is flatter and raised edges scar tissue seems to be gone.   11/14 Posterior thigh aspect of wound deteriorating, will increase frequency of treatment to keep biofilm down and hopefully avoid systemic abx.  Will include thigh in compression wrap.  Will consider culture if improvement is not seen in the next few treatments.    11/10: APRN unavailable at this time.  Will re-schedule excisional debridement to next week.   11/5: Plan for debridement of scarred edges on Tues by LPS. Tendon exposed to posterior aspect of wound, behind knee. Continue with current dressing care.  10/29: Excisional debridement by Asaf ELLER of scar tissue along the proximal edge of the posterior knee and lateral thigh.  Lateral aspect of thigh is flatten.  Medial aspect of knee has thick scar tissue that was excisionally debrided by Asaf ELLER.  Fully granulated throughout the wound bed.   10/23 wound bed mostly granular, superficial in depth, progress has been slow with the wound VAC so will trial collagen product to encourage new tissue growth.    10/19: wound bed has improved in color and is fully granulated.  Addison-wound has improved, denudation has resolved. APRN continuing with serial weekly debridements as needed.   10/16: wound friable pt now on iv abx per ID.  Indurated edges addressed with drape and foam.  Addison wound likely has yeast infection - addressing with silver powder crusting  10/14: patient seen with Asaf ELLER, stopping veraflo instillation.  Starting silver powder and regular wound VAC to see if it will help with bioburden.  Possible colonization of bacteria.  ID involved.   10/12: Inflammation noted to the proximal part of the wound bed.  Will continue to monitor, possible vac break on Wednesday to help addison-skin inflammation.    10/7: Excisional debridement performed by Asaf ELLER. Will need to continue selective debridement with NPWT changes, and weekly excisional debridement with APRN to flatten out the scar tissue.  IV abx therapy ended 10/5.   10/5: Lateral wound still with some slough, both wounds smaller per measurements. Medial wound with all granular tissue.  9/30: Patient to start abx therapy for 7 days course per Dr. Ellis.  Started dakin's instillation to veraflo  9/28: malodorous today, culture taken.    9/25: Odor noted, though unclear if from wound or pt, consider shower before next Vac change. Consider regular vac next change, wound granular and superficial.   9/23: Patient still awaiting guardianship for placement.   9/18: wound granulating nicely and responding well to current treatment.    9/16: Wound bed with granular tissue, edges are thick but appear better than previous assessments. . NPWT VF to encourage closure by secondary intention and manage drainage while encouraging oxygenation and granulation to the wound bed. 2 layer compression to assist in decrease of swelling and encourage blood flow to wounds. Wound team to follow up and change 3x/week.      Goals: Steady decrease in wound area and depth weekly.    NURSING PLAN OF CARE ORDERS (X):    Dressing changes: See Dressing Care orders: X  Skin care: See Skin Care orders:   Rectal tube care: See Rectal Tube Care orders:   Other orders:      WOUND TEAM PLAN OF CARE:   Dressing changes by wound team:        Follow up 3 times weekly:                NPWT change 3 times weekly:  X,  NPWT changes 3x/ weekly with ace wrap.  Follow up 1-2 times weekly:      Follow up Bi-Monthly:                   Follow up as needed:       Other (explain):     Anticipated discharge plans:  LTACH:        SNF/Rehab: X - patient will need ongoing wound care for LLE upon discharge - 3x/weekly           Home Health Care:           Outpatient Wound Center:            Self Care:

## 2021-01-26 PROCEDURE — 700102 HCHG RX REV CODE 250 W/ 637 OVERRIDE(OP): Performed by: NURSE PRACTITIONER

## 2021-01-26 PROCEDURE — 700111 HCHG RX REV CODE 636 W/ 250 OVERRIDE (IP): Performed by: NURSE PRACTITIONER

## 2021-01-26 PROCEDURE — 99231 SBSQ HOSP IP/OBS SF/LOW 25: CPT | Performed by: NURSE PRACTITIONER

## 2021-01-26 PROCEDURE — 92523 SPEECH SOUND LANG COMPREHEN: CPT

## 2021-01-26 PROCEDURE — A9270 NON-COVERED ITEM OR SERVICE: HCPCS | Performed by: NURSE PRACTITIONER

## 2021-01-26 PROCEDURE — 770001 HCHG ROOM/CARE - MED/SURG/GYN PRIV*

## 2021-01-26 PROCEDURE — 700101 HCHG RX REV CODE 250: Performed by: NURSE PRACTITIONER

## 2021-01-26 RX ADMIN — OXYCODONE HYDROCHLORIDE 10 MG: 10 TABLET ORAL at 19:37

## 2021-01-26 RX ADMIN — GABAPENTIN 300 MG: 300 CAPSULE ORAL at 16:00

## 2021-01-26 RX ADMIN — DIVALPROEX SODIUM 125 MG: 125 CAPSULE ORAL at 14:47

## 2021-01-26 RX ADMIN — DIVALPROEX SODIUM 125 MG: 125 CAPSULE ORAL at 06:04

## 2021-01-26 RX ADMIN — AMLODIPINE BESYLATE 5 MG: 5 TABLET ORAL at 06:04

## 2021-01-26 RX ADMIN — DIVALPROEX SODIUM 125 MG: 125 CAPSULE ORAL at 22:55

## 2021-01-26 RX ADMIN — GABAPENTIN 300 MG: 300 CAPSULE ORAL at 12:20

## 2021-01-26 RX ADMIN — LIDOCAINE 1 PATCH: 50 PATCH CUTANEOUS at 12:20

## 2021-01-26 RX ADMIN — RISPERIDONE 0.5 MG: 0.5 TABLET ORAL at 06:04

## 2021-01-26 RX ADMIN — GABAPENTIN 300 MG: 300 CAPSULE ORAL at 06:04

## 2021-01-26 RX ADMIN — OXYCODONE HYDROCHLORIDE 10 MG: 10 TABLET ORAL at 12:19

## 2021-01-26 RX ADMIN — CYCLOBENZAPRINE 10 MG: 10 TABLET, FILM COATED ORAL at 19:36

## 2021-01-26 RX ADMIN — ENOXAPARIN SODIUM 40 MG: 40 INJECTION SUBCUTANEOUS at 06:04

## 2021-01-26 RX ADMIN — RISPERIDONE 1 MG: 1 TABLET ORAL at 16:02

## 2021-01-26 ASSESSMENT — ENCOUNTER SYMPTOMS
HEADACHES: 0
NERVOUS/ANXIOUS: 1
VOMITING: 0
ABDOMINAL PAIN: 0
WEAKNESS: 0
COUGH: 0
FALLS: 0
SHORTNESS OF BREATH: 0
SENSORY CHANGE: 0
CONSTIPATION: 0
DEPRESSION: 1
PALPITATIONS: 0
INSOMNIA: 0
DIZZINESS: 0
FEVER: 0
FOCAL WEAKNESS: 0
NAUSEA: 0
CHILLS: 0
DIARRHEA: 0
SPEECH CHANGE: 0

## 2021-01-26 ASSESSMENT — PATIENT HEALTH QUESTIONNAIRE - PHQ9
1. LITTLE INTEREST OR PLEASURE IN DOING THINGS: NOT AT ALL
SUM OF ALL RESPONSES TO PHQ9 QUESTIONS 1 AND 2: 0
2. FEELING DOWN, DEPRESSED, IRRITABLE, OR HOPELESS: NOT AT ALL
SUM OF ALL RESPONSES TO PHQ9 QUESTIONS 1 AND 2: 0
2. FEELING DOWN, DEPRESSED, IRRITABLE, OR HOPELESS: NOT AT ALL
1. LITTLE INTEREST OR PLEASURE IN DOING THINGS: NOT AT ALL

## 2021-01-26 ASSESSMENT — PAIN DESCRIPTION - PAIN TYPE
TYPE: ACUTE PAIN
TYPE: SURGICAL PAIN

## 2021-01-26 NOTE — PROGRESS NOTES
Problem: Safety  Goal: Will remain free from falls  Outcome: PROGRESSING AS EXPECTED   Safety precautions in place.  Call light and personal items within reach. Bed at lowest position and locked.  Siderails up X 2.  Clutter free environment & adequate lighting. Educated on level of risk and reminded to call for assistance.  Hourly rounding in effect.     Problem: Infection  Goal: Will remain free from infection  Outcome: PROGRESSING AS EXPECTED   Afebrile. Standard precautions in effect.  Hand washing every encounter, and before & after patient care.  Verbalized understanding.     Problem: Knowledge Deficit  Goal: Knowledge of disease process/condition, treatment plan, diagnostic tests, and medications will improve  Outcome: PROGRESSING AS EXPECTED   Discussed plan of care.  Questions answered.  Verbalized understanding.

## 2021-01-26 NOTE — THERAPY
Speech Language Pathology   Cognitive-Linguistic Assessment     Patient Name: Bola Varela  AGE:  65 y.o., SEX:  male  Medical Record #: [id number]  Today's Date: 1/26/2021     Precautions: Fall Risk  Comments: poor short term memory and problem solving    Assessment    Patient is a 64 y.o. male who was admitted with chronic left lower extremity wound, sepsis and lice. He was placed on the CIWA protocol at admit given his history of etoh use. He also has a history of impaired memory, safety awareness, insight and problem solving.  He has a court date on 1/29/21 for guardianship.    Pt was given portions of the Cognistat (The Neurobehavioral Cognitive Status Examination), as well as portions of other non-standardized cognitive assessments.  Although much improved as compared to 8/10/20, pt continues to present with minimal to severe cognitive deficits in many domains.  Specifically, and of greatest concern, pt had severe deficits with short term memory, calculations, safety awareness and insight into his deficits, as well as moderate deficits with complex and functional problem solving, and abstract reasoning  (see chart below for detailed results).      Throughout evaluation, pt noted to have difficulty at times when expressing complex thoughts, with word finding difficulties and perseverations noted.  He demonstrated quick changes in mood during evaluation, from upset to jovial, and had to be re-directed to task several times.  He demonstrated severe short term memory deficits in conversation, with consistent requests to have information repeated to him.  Given pt's history of ETOH abuse, history of cognitive impairments and staff reports of ongoing intermittent confusion, disorientation and impulsiveness for several months, suspect the patient may be at his baseline level of cognitive functioning.  He will not likely benefit from cognitive therapy in the acute care setting, however would recommend 24 hour  supervision upon discharge to ensure pt's safety.  He MAY benefit from trial cognitive therapy in a more functional environment s/p discharge to see if he is able to make any cognitive gains.         Plan    Recommend Speech Therapy for Evaluation only.    Discharge Recommendations: Recommend post-acute placement upon discharge to ensure safety and sound decision making.      Subjective    RN reports pt is forgetful, impulsive and often confused during day.  RN also reports quick changes in mood.     Objective     01/26/21 1256   Prior Living Situation   Prior Services None   Housing / Facility Homeless   Lives with - Patient's Self Care Capacity Alone and Unable to Care For Self   Prior Level Of Function   Communication Impaired   Swallow Impaired   Hearing Within Functional Limits for Evaluation   Vision Wears Corrective Lenses  (pt did not have glasses with him and could not read print)   Patient's Primary Language English   Occupation (Pre-Hospital Vocational) Not Employed   Verbal Expression   Verbal Expression / Aphasia Eval (WDL) X   Verbal Output Conversation Minimal (4)   Verbal Output Functional Supervision (5)   Repetition: Sentences Minimal (4)   Naming Within Functional Limits (6-7)   Word Finding Deficits Minimal (4)   Auditory Comprehension   Auditory Comprehension (WDL) X   Yes / No Questions: Personal Information Within Functional Limits (6-7)   Follows One Unit Commands Within Functional Limits (6-7)   Follows Two Unit Commands Within Functional Limits (6-7)   Follows Three Unit Commands Within Functional Limits (6-7)   Understands Simple, Structured Conversation  Supervision (5)   Understands Complex Conversation Minimal (4)   Reading Comprehension   Comments unable to assess secondary to vision deficits   Written Expression   Comments unable to assess secondary to vision deficits   Cognitive-Linguistic   Cognitive-Linguistic (WDL) X   Level of Consciousness Alert   Orientation Level Not Oriented  to Age;Not Oriented to Year;Not Oriented to Month;Not Oriented to Day   Sustained Attention Minimal (4)   Short Term Memory Severe (2)   Simple Reasoning / Problem Solving Minimal (4)   Complex Reasoning  / Problem Solving Moderate (3)   [place] Reasoning Minimal (4)   Abstract Reasoning Moderate (3)   Safety Awareness Severe (2)   Insight into Deficits Severe (2)   Functional Math / Financial Management Severe (2)

## 2021-01-26 NOTE — CARE PLAN
Problem: Pain Management  Goal: Pain level will decrease to patient's comfort goal  Outcome: PROGRESSING AS EXPECTED   Patient pain is well managed on oral pain medications.   Problem: Psychosocial Needs:  Goal: Level of anxiety will decrease  Outcome: PROGRESSING AS EXPECTED   Pt is confuse and require frequent reorientation. Use distractions to decrease level of anxiety.

## 2021-01-26 NOTE — PROGRESS NOTES
Hospital Medicine Daily Progress Note    Date of Service  1/26/2021    Chief Complaint  Wound Infection    Hospital Course  Mr. Varela is a 65-year-old male with PMH alcohol dependence, tobacco dependence, psychiatric disorder, and HTN who presented to the emergency department 8/7/2020 with a left lower extremity wound infection. He was hospitalized at our facility in April and evaluated by orthopedic surgery who recommended wound care. Wound cultures at that time grew MSSA and Streptococcus. He had been seen at [facility] earlier that week and prescribed antibiotics, however he had not filled the prescription.  An ultrasound of that extremity was done and was negative for DVT.  He was treated for sepsis and completed an antibiotic course on 9/16/2020. He was also found to have head lice and underwent treatment for that.  A repeat wound culture was done on 9/28/2020 and grew Strep A and Proteus. Infectious disease was consulted and recommended a 5-day course of IV zosyn which was completed on 10/5/2020. On 10/12/2020 the wound care team noticed increased inflammation to the proximal part of the wound bed and switched from a vera flow wound VAC to a regular wound VAC.  ID was again consulted and on 10/14/2020 recommended to 7-day course of antibiotics with meropenem which was completed on 10/22/2020. Additionally, he was noted to have intermittent agitation so was started on risperdal, depakote, and gabapentin per psychiatric recommendations.  On 11/20/2020 he underwent left lower extremity debridement with wound VAC placement. Since then he has remained stable.  He has been deemed incapacitated to make medical decisions and is currently pending guardianship (which he is contesting) and placement, likely to a group home.     His court date is now scheduled for 1/29/2021 at 10 a.m.      Interval Problem Update  1/26- Patient resting in bed discussing results of cognitive evaluation with SLP. Patient does not  remember that he has court on Friday or what court is for. When explained to patient that court is being pursued to help appoint him a representative to help make decisions he seemed ok with proceeding. SLP states that there are remaining issues with memory and decision making/ judgement. They recommend 24/7 supervision. Will need to continue with guardianship process and placement.     Consultants/Specialty  -LPS  -Psychiatry  -Infectious Disease    Code Status  Full Code    Disposition  Pending guardianship hearing 1/29/21.    Review of Systems  Review of Systems   Constitutional: Negative for chills, fever and malaise/fatigue.   Respiratory: Negative for cough and shortness of breath.    Cardiovascular: Positive for leg swelling (improving). Negative for chest pain and palpitations.   Gastrointestinal: Negative for abdominal pain, constipation, diarrhea, nausea and vomiting.   Genitourinary: Negative for dysuria, frequency and urgency.   Musculoskeletal: Negative for falls.   Skin: Negative for itching.   Neurological: Negative for dizziness, sensory change, speech change, focal weakness, weakness and headaches.   Psychiatric/Behavioral: Positive for depression (at times). Negative for suicidal ideas. The patient is nervous/anxious (at times). The patient does not have insomnia.    All other systems reviewed and are negative.       Physical Exam  Temp:  [35.9 °C (96.6 °F)-36.2 °C (97.1 °F)] 36.2 °C (97.1 °F)  Pulse:  [74-82] 81  Resp:  [16-17] 17  BP: (112-134)/(69-90) 112/69  SpO2:  [94 %-98 %] 94 %    Physical Exam  Vitals signs and nursing note reviewed. Exam conducted with a chaperone present.   Constitutional:       General: He is awake. He is not in acute distress.     Appearance: He is underweight. He is ill-appearing. He is not toxic-appearing or diaphoretic.   HENT:      Head: Normocephalic and atraumatic.      Nose: Nose normal.      Mouth/Throat:      Lips: Pink.      Mouth: Mucous membranes are moist.    Eyes:      General: No scleral icterus.     Conjunctiva/sclera: Conjunctivae normal.   Neck:      Musculoskeletal: Normal range of motion.   Cardiovascular:      Rate and Rhythm: Normal rate.   Pulmonary:      Effort: Pulmonary effort is normal.   Abdominal:      General: There is no distension.      Tenderness: There is no abdominal tenderness.   Musculoskeletal:      Right lower leg: No edema.      Left lower leg: No edema.      Comments: Ambulates.   Skin:     General: Skin is warm and dry.      Comments: Wound Vac intact/functioning appropriately   Neurological:      Mental Status: He is alert.      Coordination: Coordination is intact.      Gait: Gait is intact. Gait normal.      Comments: Oriented to himself and hospital   Psychiatric:         Mood and Affect: Mood normal. Affect is labile.         Behavior: Behavior is not aggressive or combative. Behavior is cooperative.         Cognition and Memory: Cognition is impaired. He exhibits impaired recent memory.         Judgment: Judgment is impulsive.      Comments:            Fluids    Intake/Output Summary (Last 24 hours) at 1/26/2021 1438  Last data filed at 1/26/2021 1100  Gross per 24 hour   Intake 700 ml   Output --   Net 700 ml       Laboratory                        Imaging  IR-US GUIDED PIV   Final Result    Ultrasound-guided PERIPHERAL IV INSERTION performed by    qualified nursing staff as above.      IR-MIDLINE CATHETER INSERTION WO GUIDANCE > AGE 3   Final Result                  Ultrasound-guided midline placement performed by qualified nursing staff    as above.          IR-US GUIDED PIV   Final Result    Ultrasound-guided PERIPHERAL IV INSERTION performed by    qualified nursing staff as above.      IR-MIDLINE CATHETER INSERTION WO GUIDANCE > AGE 3   Final Result                  Ultrasound-guided midline placement performed by qualified nursing staff    as above.          US-KOSTAS SINGLE LEVEL BILAT   Final Result      CT-HEAD W/O   Final  Result      No acute intracranial abnormality      DX-TIBIA AND FIBULA LEFT   Final Result      1.  Healed distal metaphyseal fractures of the left fibula and tibia with tibial IM layla in place.      2.  No acute fracture or dislocation.      3.  No radiopaque soft tissue foreign body.      DX-FEMUR-2+ LEFT   Final Result      1.  No radiographic evidence of acute traumatic injury left femur.      2.  Unchanged cortical thickening in the posterior aspect of the distal femoral diaphysis which may represent sequela of prior injury or infection.      3.  No soft tissue foreign body identified.      US-EXTREMITY VENOUS LOWER UNILAT LEFT   Final Result      DX-CHEST-PORTABLE (1 VIEW)   Final Result      No acute cardiopulmonary abnormality.           Assessment/Plan  * Wound of left leg- (present on admission)  Assessment & Plan  -Wound vac - he intermittently dislodges. Requires ongoing education  -Further management per wound care/LPS/ortho.   -Pain control as needed.     Encephalopathy- (present on admission)  Assessment & Plan  -Improved   -Per psychiatry and Dr. Velázquez on 1/11: Patient is incapacitated.  -Guardianship and placement pending.  Patient is contesting guardianship, hearing is now scheduled for 1/29/2021 at 10 AM.  -Repeat cognitive evaluation to be preformed the week of hearing    Psychiatric disorder- (present on admission)  Assessment & Plan  -Unspecified.  -Continue Risperdal and Depakote.  -Psychiatry has consulted and deemed him incapacitated to leave AMA or make medical decisions.  -Pending guardianship, which he is contesting.  -Last cognitive evaluation 8/20.  Court requesting repeat cognitive evaluation prior to hearing at the end of January.  Patient's medical condition has significantly improved since prior cognitive evaluation as it appeared to have been completed when patient was still having possible effects from alcohol withdrawal.   -SLP repeated Cognitive eval today- continue to recommend  24/7 supervision       Essential hypertension- (present on admission)  Assessment & Plan  -Well controlled on amlodipine.    Flexion contracture of knee, 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)  Assessment & Plan  -Secondary to chronic wound in that area.  -PT/OT.  -Does not seem to limit his mobility.  Is frequently ambulatory.    Infection of wound due to methicillin resistant Staphylococcus aureus (MRSA)- (present on admission)  Assessment & Plan  -History of MRSA.  -Poor compliance with OP wound care. Poor compliance with wound vac at times.   -Continue pain control as needed.  -LPS and wound care following - debridements and wound VAC changes per their recommendations  -Cultures repeated 12/14 - positive for Proteus species, pan sensitive.  Completed regimen of augmentin.     Emphysema/COPD (HCC)- (present on admission)  Assessment & Plan  -Chronic and stable off medication, without acute exacerbation.    Protein malnutrition (HCC)- (present on admission)  Assessment & Plan  -Body mass index is 21.35 kg/m².   -Continue TID supplements.  -Encourage PO intake.       VTE prophylaxis: Ambulatory     I have performed a physical exam and reviewed and updated ROS and Assessment/Plan today 1/26/2021. In review of the previous note there are no changes except as documented above    I certify the patient requires continued medically necessary hospital services for the treatment of non-healing wound and cognitive disorder.  The patient will remain in the hospital for the foreseeable future.  Discharge may or may not occur in the next 20 days due to ongoing discharge delays due to having no medically acceptable discharge options.        LOUISE Candelario.

## 2021-01-27 PROCEDURE — 700111 HCHG RX REV CODE 636 W/ 250 OVERRIDE (IP): Performed by: NURSE PRACTITIONER

## 2021-01-27 PROCEDURE — 770001 HCHG ROOM/CARE - MED/SURG/GYN PRIV*

## 2021-01-27 PROCEDURE — A9270 NON-COVERED ITEM OR SERVICE: HCPCS | Performed by: NURSE PRACTITIONER

## 2021-01-27 PROCEDURE — 97606 NEG PRS WND THER DME>50 SQCM: CPT

## 2021-01-27 PROCEDURE — 700102 HCHG RX REV CODE 250 W/ 637 OVERRIDE(OP): Performed by: NURSE PRACTITIONER

## 2021-01-27 PROCEDURE — 700101 HCHG RX REV CODE 250: Performed by: NURSE PRACTITIONER

## 2021-01-27 RX ADMIN — DIVALPROEX SODIUM 125 MG: 125 CAPSULE ORAL at 13:35

## 2021-01-27 RX ADMIN — RISPERIDONE 1 MG: 1 TABLET ORAL at 17:16

## 2021-01-27 RX ADMIN — HYDROXYZINE HYDROCHLORIDE 25 MG: 50 TABLET, FILM COATED ORAL at 14:50

## 2021-01-27 RX ADMIN — LIDOCAINE 1 PATCH: 50 PATCH CUTANEOUS at 12:20

## 2021-01-27 RX ADMIN — OXYCODONE HYDROCHLORIDE 10 MG: 10 TABLET ORAL at 10:16

## 2021-01-27 RX ADMIN — DIVALPROEX SODIUM 125 MG: 125 CAPSULE ORAL at 22:14

## 2021-01-27 RX ADMIN — DIVALPROEX SODIUM 125 MG: 125 CAPSULE ORAL at 04:22

## 2021-01-27 RX ADMIN — AMLODIPINE BESYLATE 5 MG: 5 TABLET ORAL at 04:22

## 2021-01-27 RX ADMIN — OXYCODONE HYDROCHLORIDE 10 MG: 10 TABLET ORAL at 22:15

## 2021-01-27 RX ADMIN — GABAPENTIN 300 MG: 300 CAPSULE ORAL at 17:16

## 2021-01-27 RX ADMIN — RISPERIDONE 0.5 MG: 0.5 TABLET ORAL at 04:22

## 2021-01-27 RX ADMIN — ENOXAPARIN SODIUM 40 MG: 40 INJECTION SUBCUTANEOUS at 04:23

## 2021-01-27 RX ADMIN — GABAPENTIN 300 MG: 300 CAPSULE ORAL at 04:23

## 2021-01-27 RX ADMIN — OXYCODONE HYDROCHLORIDE 10 MG: 10 TABLET ORAL at 02:31

## 2021-01-27 RX ADMIN — GABAPENTIN 300 MG: 300 CAPSULE ORAL at 12:20

## 2021-01-27 ASSESSMENT — PAIN DESCRIPTION - PAIN TYPE
TYPE: SURGICAL PAIN
TYPE: ACUTE PAIN;SURGICAL PAIN

## 2021-01-27 NOTE — WOUND TEAM
[facility]  Inpatient Services  Wound and Skin Care Progress Note    Admission Date: 8/7/2020     Last order of IP CONSULT TO WOUND CARE was found on 9/1/2020 from Hospital Encounter on 8/7/2020     HPI, PMH, SH: Reviewed    Unit where seen by Wound Team: [id number]    WOUND CONSULT/FOLLOW UP RELATED TO:  Left leg follow-up; scheduled npwt dressing change.    Self Report / Pain Level: Pt c/o moderate pain at the proximal posterior thigh; yelling out and raising hand.      OBJECTIVE: NPWT dressing and 2 layer compression wrap in place    WOUND TYPE, LOCATION, CHARACTERISTICS (Pressure Injuries: location, stage, POA or date identified)     Negative Pressure Wound Therapy 11/20/20 Leg Lateral;Posterior;Medial Left (Active)   NPWT Pump Mode / Pressure Setting Ulta;Continuous;125 mmHg 01/27/21 1500   Dressing Type Medium;Black Foam (Regular) 01/27/21 1500   Number of Foam Pieces Used 3 01/27/21 1500   Canister Changed No 01/27/21 1500   Output (mL) 350 mL 01/16/21 0310   NEXT Dressing Change/Treatment Date 01/29/21 01/27/21 1500   WOUND NURSE ONLY - Time Spent with Patient (mins) 75 01/18/21 1600           Wound 11/19/20 Full Thickness Wound Leg Lateral;Posterior;Medial Left (Active)   Wound Image    01/27/21 1500   Site Assessment Red 01/27/21 1500   Periwound Assessment Clean;Dry;Intact 01/27/21 1500   Margins Defined edges 01/27/21 1500   Closure Secondary intention 01/27/21 1500   Drainage Amount Moderate 01/27/21 1500   Drainage Description Serosanguineous;Green;Yellow 01/27/21 1500   Treatments Cleansed;Site care;CSWD - Conservative Sharp Wound Debridement 01/27/21 1500   Wound Cleansing Approved Wound Cleanser 01/27/21 1500   Periwound Protectant Antifungal Therapy;Benzoin;Drape 01/27/21 1500   Dressing Cleansing/Solutions Not Applicable 01/27/21 1500   Dressing Options Wound Vac;Compression Wrap Two Layer 01/27/21 1500   Dressing Changed New 01/27/21 1500   Dressing Status Clean;Dry;Intact 01/27/21 1500    Dressing Change/Treatment Frequency By Wound Team Only 01/27/21 1500   NEXT Dressing Change/Treatment Date 01/29/21 01/27/21 1500   NEXT Weekly Photo (Inpatient Only) 02/03/21 01/27/21 1500   Non-staged Wound Description Full thickness 01/22/21 0912   Wound Bed Granulation (%) 90 % 01/22/21 0912   Wound Bed Slough (%) 10 % 01/22/21 0912   Wound Bed Granulation (%) - Post-Procedure 100 % 01/13/21 1100   Shape linear irregular 01/27/21 1500   Wound Odor Mild 01/27/21 1500   Pulses Left;2+;DP;PT 01/27/21 1500   Exposed Structures None 01/27/21 1500   WOUND NURSE ONLY - Time Spent with Patient (mins) 75 01/22/21 0912          Wounds are no longer connected - measured as 2 wounds - posterior thigh and medial leg.        VASCULAR    Lab Values:    Lab Results   Component Value Date/Time    WBC 6.5 11/22/2020 02:44 AM    RBC 3.54 (L) 11/22/2020 02:44 AM    HEMOGLOBIN 9.9 (L) 11/22/2020 02:44 AM    HEMATOCRIT 30.5 (L) 11/22/2020 02:44 AM    CREACTPROT 1.07 (H) 08/12/2020 01:55 PM    SEDRATEWES 85 (H) 08/07/2020 01:20 PM    HBA1C 5.7 (H) 11/09/2018 06:30 PM        Culture Results show:  Recent Results (from the past 720 hour(s))   CULTURE WOUND W/ GRAM STAIN    Collection Time: 09/01/20  2:00 PM    Specimen: Left Leg; Wound   Result Value Ref Range    Significant Indicator POS (POS)     Source WND     Site LEFT LEG     Culture Result - (A)     Gram Stain Result       Moderate Gram positive cocci.  Moderate Gram positive rods.      Culture Result (A)      Beta Hemolytic Streptococcus group A  Moderate growth      Culture Result (A)      Methicillin Resistant Staphylococcus aureus  Moderate growth      Culture Result (A)      Diphtheroids  Moderate growth  Mixed morphologies.     KOSTAS: 9/20/21   Left.    Doppler waveforms of the common femoral artery are of high amplitude and    triphasic.    Doppler waveforms at the ankle are brisk and triphasic.    Ankle-brachial index is normal.    Unable to obtained popliteal waveforms  due to wound bandages.     Self Report / Pain Level: Pt c/o moderate pain at the most proximal posterior thigh.  Patient pre-medicated prior to removal of dressings.     INTERVENTIONS BY WOUND TEAM: wound care performed per standard procedures  Cleansed with wound cleanser  Debridement - CSWD with currette to remove >20cm2 of non-viable tissue  Periwound - crusted with nystatin and no sting skin prep, drape  Primary - 3 silver foam to wound bed and secured with drape, restarted NPWT 150mmHg, no leaks noted.   Secondary - 2 layer compression wrap foot/leg/thigh    Interdisciplinary consultation: Patient, Bedside RN    EVALUATION / RATIONALE FOR TREATMENT:     1/27/21: Restarted NPWT, addison-wound looks better.   1/25/21: denuded tissue, vac vacation for until 1/27.  Re-assessment to place wound VAC to left LE.   1/21/21 area has decreased since last measurements.  Odor still present.  Addison skin compromised probably yeast infection under VAC drape.  Will hold VAC for 72 hours and re assess.  Nystatin to address yeast and dry out addison skin  1/15/21: position of leg may affect posterior thigh measurements.  Medial Leg wound area decreased.    01/10/2021: Wound vac replaced, patient accidentally removed vac and bedside RN was unable to repair.  Patient tolerated wound VAC placement. Patient had moderate ss with some green tinge drainage.   01/04/2021: 2 layer compression wrap re-applied to left LE due to ACE wrap leaving too much indentation to left LE.   12/31/2020 thigh wound much narrower.  Biofilm redeveloping and odor still present.   12/27/20: Wound bed granular and odor has lessen but still persists.   12/18/20 wound length decreased.  Odor persists.    12/14/2020: re-cultured wound bed.  12/11/2020 POD 23 Length of wounds has decreased, width has not changed.  Odor persists.  Pt continues to become angry with VAC when it limits his mobility.    12/7/2020 POD 19 odor persists, improved wound bed after debridement.   Possible bacterial vs yeast vs fungal infection.  Culture taken.  Nystatin to treat possible fungal infection, silver foam to decrease microbial population.    12/4/2020 POD# 16 odor worsening, more yellow tissue present, will add nystatin and silver foam next week and consider a culture  11/30 POD#11 granular buds visible to wound bed.  Same as prior.   11/27 POD#8 wound is odorous likely related to biologic dressing.  No overt signs of infection.  Granular buds are visible through adaptic.  Edges do not appear as thick as they were prior to last sx.  Continue with current treatment.    11/23: POD#4 s/p I&D of left LE wounds and biologic placed by Dr. Olvera on 11/19.  Wound bed is flatter and raised edges scar tissue seems to be gone.   11/14 Posterior thigh aspect of wound deteriorating, will increase frequency of treatment to keep biofilm down and hopefully avoid systemic abx.  Will include thigh in compression wrap.  Will consider culture if improvement is not seen in the next few treatments.    11/10: APRN unavailable at this time.  Will re-schedule excisional debridement to next week.   11/5: Plan for debridement of scarred edges on Tues by LPS. Tendon exposed to posterior aspect of wound, behind knee. Continue with current dressing care.  10/29: Excisional debridement by Asaf ELLER of scar tissue along the proximal edge of the posterior knee and lateral thigh.  Lateral aspect of thigh is flatten.  Medial aspect of knee has thick scar tissue that was excisionally debrided by Asaf ELLER.  Fully granulated throughout the wound bed.   10/23 wound bed mostly granular, superficial in depth, progress has been slow with the wound VAC so will trial collagen product to encourage new tissue growth.    10/19: wound bed has improved in color and is fully granulated.  Meredith-wound has improved, denudation has resolved. APRN continuing with serial weekly debridements as needed.   10/16: wound friable pt now  on iv abx per ID.  Indurated edges addressed with drape and foam.  Addison wound likely has yeast infection - addressing with silver powder crusting  10/14: patient seen with Asaf ELLER, stopping veraflo instillation.  Starting silver powder and regular wound VAC to see if it will help with bioburden.  Possible colonization of bacteria.  ID involved.   10/12: Inflammation noted to the proximal part of the wound bed.  Will continue to monitor, possible vac break on Wednesday to help addison-skin inflammation.   10/7: Excisional debridement performed by Asaf ELLER. Will need to continue selective debridement with NPWT changes, and weekly excisional debridement with APRN to flatten out the scar tissue.  IV abx therapy ended 10/5.   10/5: Lateral wound still with some slough, both wounds smaller per measurements. Medial wound with all granular tissue.  9/30: Patient to start abx therapy for 7 days course per Dr. Ellis.  Started dakin's instillation to veraflo  9/28: malodorous today, culture taken.    9/25: Odor noted, though unclear if from wound or pt, consider shower before next Vac change. Consider regular vac next change, wound granular and superficial.   9/23: Patient still awaiting guardianship for placement.   9/18: wound granulating nicely and responding well to current treatment.    9/16: Wound bed with granular tissue, edges are thick but appear better than previous assessments. . NPWT VF to encourage closure by secondary intention and manage drainage while encouraging oxygenation and granulation to the wound bed. 2 layer compression to assist in decrease of swelling and encourage blood flow to wounds. Wound team to follow up and change 3x/week.      Goals: Steady decrease in wound area and depth weekly.    NURSING PLAN OF CARE ORDERS (X):    Dressing changes: See Dressing Care orders: X  Skin care: See Skin Care orders:   Rectal tube care: See Rectal Tube Care orders:   Other orders:      WOUND  TEAM PLAN OF CARE:   Dressing changes by wound team:        Follow up 3 times weekly:                NPWT change 3 times weekly:  X,  NPWT changes 3x/ weekly with ace wrap.  Follow up 1-2 times weekly:      Follow up Bi-Monthly:                   Follow up as needed:       Other (explain):     Anticipated discharge plans:  LTACH:        SNF/Rehab: X - patient will need ongoing wound care for LLE upon discharge - 3x/weekly           Home Health Care:           Outpatient Wound Center:            Self Care:

## 2021-01-27 NOTE — CARE PLAN
Problem: Fluid Volume:  Goal: Will maintain balanced intake and output  Outcome: PROGRESSING AS EXPECTED  Note: Patient intaking adequate PO fluids     Problem: Psychosocial Needs:  Goal: Level of anxiety will decrease  Outcome: PROGRESSING AS EXPECTED  Note: Patient calm sleeping in bed

## 2021-01-27 NOTE — PROGRESS NOTES
Pharmacy Pharmacotherapy Consult for LOS >30 days    Admit Date: 8/7/2020      Medications were reviewed for appropriateness and ongoing need.     Current Facility-Administered Medications   Medication Dose Route Frequency Provider Last Rate Last Admin   • gabapentin (NEURONTIN) capsule 300 mg  300 mg Oral TID Naye Florence, A.P.R.N.   300 mg at 01/27/21 1220   • enoxaparin (LOVENOX) inj 40 mg  40 mg Subcutaneous DAILY Naye Florence, A.P.R.N.   40 mg at 01/27/21 0423   • oxyCODONE immediate release (ROXICODONE) tablet 10 mg  10 mg Oral Q6HRS PRN Jeovanny Andres, A.P.R.N.   10 mg at 01/27/21 1016   • cyclobenzaprine (Flexeril) tablet 10 mg  10 mg Oral TID PRN Gisselle Sinclair, A.P.R.N.   10 mg at 01/26/21 1936   • lidocaine (LIDODERM) 5 % 1 Patch  1 Patch Transdermal Q24HR Gisselle Bowensson, A.P.R.N.   1 Patch at 01/27/21 1220   • hydrOXYzine HCl (ATARAX) tablet 25 mg  25 mg Oral TID PRN Gisselle Bowensson, A.P.R.N.   25 mg at 01/25/21 2025   • senna-docusate (PERICOLACE or SENOKOT S) 8.6-50 MG per tablet 2 Tab  2 Tab Oral BID PRN Demarcus Mccabe, A.P.N.   2 Tab at 12/07/20 0632   • risperiDONE (RISPERDAL) tablet 0.5 mg  0.5 mg Oral DAILY Demarcus Mccabe, A.P.N.   0.5 mg at 01/27/21 0422   • risperiDONE (RISPERDAL) tablet 1 mg  1 mg Oral Q EVENING Demarcus Mccabe, A.P.N.   1 mg at 01/26/21 1602   • divalproex (DEPAKOTE SPRINKLE) capsule 125 mg  125 mg Oral Q8HRS Jeovanny Albaradok, A.P.R.N.   125 mg at 01/27/21 0422   • amLODIPine (NORVASC) tablet 5 mg  5 mg Oral Q DAY Demarcus Mccabe, A.P.N.   5 mg at 01/27/21 0422       Recommendations:    No recommendations at this time.     Jairo Landin, Pharmacy      Addendum:  Agree with the above. All medications appear appropriate at this time.     Melody Barger, PharmD., BCPS

## 2021-01-27 NOTE — CARE PLAN
Problem: Psychosocial Needs:  Goal: Level of anxiety will decrease  Outcome: PROGRESSING SLOWER THAN EXPECTED       Problem: Venous Thromboembolism (VTW)/Deep Vein Thrombosis (DVT) Prevention:  Goal: Patient will participate in Venous Thrombosis (VTE)/Deep Vein Thrombosis (DVT)Prevention Measures  Outcome: PROGRESSING AS EXPECTED  Lovenox in use; pt also ambulates frequently up ad erika     Problem: Pain Management  Goal: Pain level will decrease to patient's comfort goal  Outcome: PROGRESSING AS EXPECTED   PRN pain meds in use

## 2021-01-28 PROCEDURE — A9270 NON-COVERED ITEM OR SERVICE: HCPCS | Performed by: NURSE PRACTITIONER

## 2021-01-28 PROCEDURE — 700102 HCHG RX REV CODE 250 W/ 637 OVERRIDE(OP): Performed by: NURSE PRACTITIONER

## 2021-01-28 PROCEDURE — 700101 HCHG RX REV CODE 250: Performed by: NURSE PRACTITIONER

## 2021-01-28 PROCEDURE — 770001 HCHG ROOM/CARE - MED/SURG/GYN PRIV*

## 2021-01-28 PROCEDURE — 700111 HCHG RX REV CODE 636 W/ 250 OVERRIDE (IP): Performed by: NURSE PRACTITIONER

## 2021-01-28 RX ADMIN — RISPERIDONE 1 MG: 1 TABLET ORAL at 16:12

## 2021-01-28 RX ADMIN — OXYCODONE HYDROCHLORIDE 10 MG: 10 TABLET ORAL at 21:14

## 2021-01-28 RX ADMIN — DIVALPROEX SODIUM 125 MG: 125 CAPSULE ORAL at 06:10

## 2021-01-28 RX ADMIN — LIDOCAINE 1 PATCH: 50 PATCH CUTANEOUS at 13:17

## 2021-01-28 RX ADMIN — OXYCODONE HYDROCHLORIDE 10 MG: 10 TABLET ORAL at 13:17

## 2021-01-28 RX ADMIN — HYDROXYZINE HYDROCHLORIDE 25 MG: 50 TABLET, FILM COATED ORAL at 16:12

## 2021-01-28 RX ADMIN — GABAPENTIN 300 MG: 300 CAPSULE ORAL at 16:12

## 2021-01-28 RX ADMIN — CYCLOBENZAPRINE 10 MG: 10 TABLET, FILM COATED ORAL at 16:12

## 2021-01-28 RX ADMIN — ENOXAPARIN SODIUM 40 MG: 40 INJECTION SUBCUTANEOUS at 06:09

## 2021-01-28 RX ADMIN — GABAPENTIN 300 MG: 300 CAPSULE ORAL at 06:10

## 2021-01-28 RX ADMIN — GABAPENTIN 300 MG: 300 CAPSULE ORAL at 13:17

## 2021-01-28 RX ADMIN — RISPERIDONE 0.5 MG: 0.5 TABLET ORAL at 06:10

## 2021-01-28 RX ADMIN — DIVALPROEX SODIUM 125 MG: 125 CAPSULE ORAL at 21:13

## 2021-01-28 RX ADMIN — DIVALPROEX SODIUM 125 MG: 125 CAPSULE ORAL at 13:17

## 2021-01-28 RX ADMIN — AMLODIPINE BESYLATE 5 MG: 5 TABLET ORAL at 06:10

## 2021-01-28 ASSESSMENT — PAIN DESCRIPTION - PAIN TYPE
TYPE: ACUTE PAIN;SURGICAL PAIN
TYPE: ACUTE PAIN
TYPE: ACUTE PAIN;SURGICAL PAIN
TYPE: ACUTE PAIN;SURGICAL PAIN
TYPE: ACUTE PAIN
TYPE: ACUTE PAIN;SURGICAL PAIN
TYPE: ACUTE PAIN;SURGICAL PAIN

## 2021-01-28 NOTE — CARE PLAN
Problem: Infection  Goal: Will remain free from infection  Outcome: PROGRESSING AS EXPECTED  Note: Standard precautions and 100% hand hygiene compliance in place     Problem: Knowledge Deficit  Goal: Knowledge of disease process/condition, treatment plan, diagnostic tests, and medications will improve  Outcome: PROGRESSING SLOWER THAN EXPECTED  Note: Patient is confused- does not remember reason for wound vac.

## 2021-01-28 NOTE — CARE PLAN
Problem: Safety  Goal: Will remain free from injury  Outcome: PROGRESSING AS EXPECTED  Safety precautions are in place including bed locked and in lowest position, upper bed rails up, bed alarm on, call light within reach, treaded socks on, tray table and personal belongings within reach.      Problem: Psychosocial Needs:  Goal: Level of anxiety will decrease  Outcome: PROGRESSING AS EXPECTED  Patient is calm with unlabored breathing. Patient's pain is controlled with medication, distraction, rest and repositioning.

## 2021-01-28 NOTE — DISCHARGE PLANNING
Discussed case with Dr. Velázquez. Guardianship hearing scheduled for tomorrow 1/29 at 1000. It is expected that guardianship will be granted.

## 2021-01-28 NOTE — DISCHARGE PLANNING
Anticipated Discharge Disposition: Guardianship then GH placement.     Action: Guardianship hearing scheduled for tomorrow 1/29. Once guardian established and financials identified, can proceed with GH placement    Barriers to Discharge: Pending guardianship.    Plan: Collaborate with guardian for GH placement.

## 2021-01-29 PROCEDURE — 700102 HCHG RX REV CODE 250 W/ 637 OVERRIDE(OP): Performed by: NURSE PRACTITIONER

## 2021-01-29 PROCEDURE — 770001 HCHG ROOM/CARE - MED/SURG/GYN PRIV*

## 2021-01-29 PROCEDURE — A9270 NON-COVERED ITEM OR SERVICE: HCPCS | Performed by: NURSE PRACTITIONER

## 2021-01-29 PROCEDURE — 97606 NEG PRS WND THER DME>50 SQCM: CPT

## 2021-01-29 PROCEDURE — 700111 HCHG RX REV CODE 636 W/ 250 OVERRIDE (IP): Performed by: NURSE PRACTITIONER

## 2021-01-29 PROCEDURE — 700101 HCHG RX REV CODE 250: Performed by: NURSE PRACTITIONER

## 2021-01-29 RX ADMIN — GABAPENTIN 300 MG: 300 CAPSULE ORAL at 04:04

## 2021-01-29 RX ADMIN — AMLODIPINE BESYLATE 5 MG: 5 TABLET ORAL at 04:04

## 2021-01-29 RX ADMIN — LIDOCAINE 1 PATCH: 50 PATCH CUTANEOUS at 13:10

## 2021-01-29 RX ADMIN — OXYCODONE HYDROCHLORIDE 10 MG: 10 TABLET ORAL at 13:09

## 2021-01-29 RX ADMIN — GABAPENTIN 300 MG: 300 CAPSULE ORAL at 13:09

## 2021-01-29 RX ADMIN — OXYCODONE HYDROCHLORIDE 10 MG: 10 TABLET ORAL at 21:34

## 2021-01-29 RX ADMIN — HYDROXYZINE HYDROCHLORIDE 25 MG: 50 TABLET, FILM COATED ORAL at 16:58

## 2021-01-29 RX ADMIN — GABAPENTIN 300 MG: 300 CAPSULE ORAL at 16:58

## 2021-01-29 RX ADMIN — ENOXAPARIN SODIUM 40 MG: 40 INJECTION SUBCUTANEOUS at 04:04

## 2021-01-29 RX ADMIN — DIVALPROEX SODIUM 125 MG: 125 CAPSULE ORAL at 04:04

## 2021-01-29 RX ADMIN — DIVALPROEX SODIUM 125 MG: 125 CAPSULE ORAL at 21:33

## 2021-01-29 RX ADMIN — RISPERIDONE 0.5 MG: 0.5 TABLET ORAL at 04:04

## 2021-01-29 RX ADMIN — DIVALPROEX SODIUM 125 MG: 125 CAPSULE ORAL at 13:09

## 2021-01-29 RX ADMIN — OXYCODONE HYDROCHLORIDE 10 MG: 10 TABLET ORAL at 04:03

## 2021-01-29 RX ADMIN — CYCLOBENZAPRINE 10 MG: 10 TABLET, FILM COATED ORAL at 21:33

## 2021-01-29 RX ADMIN — RISPERIDONE 1 MG: 1 TABLET ORAL at 16:58

## 2021-01-29 ASSESSMENT — PAIN DESCRIPTION - PAIN TYPE
TYPE: ACUTE PAIN;CHRONIC PAIN;SURGICAL PAIN
TYPE: ACUTE PAIN;SURGICAL PAIN;CHRONIC PAIN
TYPE: ACUTE PAIN;SURGICAL PAIN
TYPE: ACUTE PAIN
TYPE: ACUTE PAIN

## 2021-01-29 NOTE — CARE PLAN
Problem: Safety  Goal: Will remain free from injury  Outcome: PROGRESSING AS EXPECTED  Note: Non-slip socks are on, bed is locked and in lowest position, personal belongings are within reach, room is free of clutter and spills, call light is in place. Patient educated on how to use the call light and how to call for assistance. Patient verbalized understanding.       Problem: Infection  Goal: Will remain free from infection  Outcome: PROGRESSING AS EXPECTED  Note: Standard precautions in place. Cleansed hands before and after patient care to prevent the spread of germs.

## 2021-01-29 NOTE — CARE PLAN
Problem: Discharge Barriers/Planning  Goal: Patient's continuum of care needs will be met  Outcome: PROGRESSING SLOWER THAN EXPECTED  Patient has guardianship hearing today at 10:00.     Problem: Pain Management  Goal: Pain level will decrease to patient's comfort goal  Outcome: PROGRESSING AS EXPECTED  Patient's pain is well controlled with current regimen.

## 2021-01-30 PROCEDURE — A9270 NON-COVERED ITEM OR SERVICE: HCPCS | Performed by: NURSE PRACTITIONER

## 2021-01-30 PROCEDURE — 700101 HCHG RX REV CODE 250: Performed by: NURSE PRACTITIONER

## 2021-01-30 PROCEDURE — 700102 HCHG RX REV CODE 250 W/ 637 OVERRIDE(OP): Performed by: NURSE PRACTITIONER

## 2021-01-30 PROCEDURE — 770001 HCHG ROOM/CARE - MED/SURG/GYN PRIV*

## 2021-01-30 PROCEDURE — 99231 SBSQ HOSP IP/OBS SF/LOW 25: CPT | Performed by: NURSE PRACTITIONER

## 2021-01-30 PROCEDURE — 700111 HCHG RX REV CODE 636 W/ 250 OVERRIDE (IP): Performed by: NURSE PRACTITIONER

## 2021-01-30 RX ADMIN — AMLODIPINE BESYLATE 5 MG: 5 TABLET ORAL at 05:36

## 2021-01-30 RX ADMIN — RISPERIDONE 1 MG: 1 TABLET ORAL at 17:43

## 2021-01-30 RX ADMIN — GABAPENTIN 300 MG: 300 CAPSULE ORAL at 17:42

## 2021-01-30 RX ADMIN — DIVALPROEX SODIUM 125 MG: 125 CAPSULE ORAL at 05:35

## 2021-01-30 RX ADMIN — DIVALPROEX SODIUM 125 MG: 125 CAPSULE ORAL at 13:24

## 2021-01-30 RX ADMIN — RISPERIDONE 0.5 MG: 0.5 TABLET ORAL at 05:36

## 2021-01-30 RX ADMIN — GABAPENTIN 300 MG: 300 CAPSULE ORAL at 12:14

## 2021-01-30 RX ADMIN — HYDROXYZINE HYDROCHLORIDE 25 MG: 50 TABLET, FILM COATED ORAL at 13:24

## 2021-01-30 RX ADMIN — ENOXAPARIN SODIUM 40 MG: 40 INJECTION SUBCUTANEOUS at 05:35

## 2021-01-30 RX ADMIN — HYDROXYZINE HYDROCHLORIDE 25 MG: 50 TABLET, FILM COATED ORAL at 22:18

## 2021-01-30 RX ADMIN — OXYCODONE HYDROCHLORIDE 10 MG: 10 TABLET ORAL at 22:18

## 2021-01-30 RX ADMIN — GABAPENTIN 300 MG: 300 CAPSULE ORAL at 05:36

## 2021-01-30 RX ADMIN — OXYCODONE HYDROCHLORIDE 10 MG: 10 TABLET ORAL at 12:15

## 2021-01-30 RX ADMIN — CYCLOBENZAPRINE 10 MG: 10 TABLET, FILM COATED ORAL at 22:15

## 2021-01-30 RX ADMIN — CYCLOBENZAPRINE 10 MG: 10 TABLET, FILM COATED ORAL at 13:25

## 2021-01-30 RX ADMIN — DIVALPROEX SODIUM 125 MG: 125 CAPSULE ORAL at 22:18

## 2021-01-30 RX ADMIN — LIDOCAINE 1 PATCH: 50 PATCH CUTANEOUS at 12:15

## 2021-01-30 ASSESSMENT — ENCOUNTER SYMPTOMS
DIZZINESS: 0
FALLS: 0
DIARRHEA: 0
INSOMNIA: 0
SENSORY CHANGE: 0
SPEECH CHANGE: 0
WEAKNESS: 0
MEMORY LOSS: 1
FEVER: 0
SHORTNESS OF BREATH: 0
VOMITING: 0
PALPITATIONS: 0
HEADACHES: 0
FOCAL WEAKNESS: 0
CONSTIPATION: 0
NERVOUS/ANXIOUS: 1
NAUSEA: 0
DEPRESSION: 1
CHILLS: 0
COUGH: 0
ABDOMINAL PAIN: 0

## 2021-01-30 ASSESSMENT — PAIN DESCRIPTION - PAIN TYPE
TYPE: ACUTE PAIN;SURGICAL PAIN
TYPE: ACUTE PAIN;SURGICAL PAIN

## 2021-01-30 NOTE — DISCHARGE PLANNING
Anticipated Discharge Disposition: Guardianship granted- pending GH Placement    Action: LSW coordinated Zoom meeting on [employer] phone for pt's court hearing today at 1045.  The  determined that pt will be assigned a public guardian who will assist pt w/ financials and placement.    Pt verbalized agreement w/ this plan and expressed happiness in having someone that can assist him to find a place to live.    Barriers to Discharge: placement, financials    Plan: CM to remain available to assist Public Guardian w/ placement process.

## 2021-01-30 NOTE — CARE PLAN
Problem: Safety  Goal: Will remain free from injury  Outcome: PROGRESSING AS EXPECTED  Note: The patient is able to ambulate with ease and takes care to ensure he does not become tangled with his wound vac.      Problem: Fluid Volume:  Goal: Will maintain balanced intake and output  Outcome: PROGRESSING AS EXPECTED  Note: Patient has adequate oral fluid intake and voids appropriately.

## 2021-01-30 NOTE — PROGRESS NOTES
Hospital Medicine Daily Progress Note    Date of Service  1/30/2021    Chief Complaint  Wound Infection    Hospital Course  Mr. Varela is a 65-year-old male with PMH alcohol dependence, tobacco dependence, psychiatric disorder, and HTN who presented to the emergency department 8/7/2020 with a left lower extremity wound infection. He was hospitalized at our facility in April and evaluated by orthopedic surgery who recommended wound care. Wound cultures at that time grew MSSA and Streptococcus. He had been seen at [facility] earlier that week and prescribed antibiotics, however he had not filled the prescription.  An ultrasound of that extremity was done and was negative for DVT.  He was treated for sepsis and completed an antibiotic course on 9/16/2020. He was also found to have head lice and underwent treatment for that.  A repeat wound culture was done on 9/28/2020 and grew Strep A and Proteus. Infectious disease was consulted and recommended a 5-day course of IV zosyn which was completed on 10/5/2020. On 10/12/2020 the wound care team noticed increased inflammation to the proximal part of the wound bed and switched from a vera flow wound VAC to a regular wound VAC.  ID was again consulted and on 10/14/2020 recommended to 7-day course of antibiotics with meropenem which was completed on 10/22/2020. Additionally, he was noted to have intermittent agitation so was started on risperdal, depakote, and gabapentin per psychiatric recommendations.  On 11/20/2020 he underwent left lower extremity debridement with wound VAC placement. Since then he has remained stable.  He has been deemed incapacitated to make medical decisions and is currently pending guardianship (which he is contesting) and placement, likely to a group home.     Guardianship granted on 1/29/21    Interval Problem Update  1/26- Patient resting in bed discussing results of cognitive evaluation with SLP. Patient does not remember that he has court  on Friday or what court is for. When explained to patient that court is being pursued to help appoint him a representative to help make decisions he seemed ok with proceeding. SLP states that there are remaining issues with memory and decision making/ judgement. They recommend 24/7 supervision. Will need to continue with guardianship process and placement.     1/30- Patient sitting on side of bed eating breakfast. States only complaint at this time is that he has spilled his pancake syrup all over his pants. Patient now has guardian in place and TOYA/JOAQUÍN may attempt to start working on placement. Patient is on board with this plan.      Consultants/Specialty  -LPS  -Psychiatry  -Infectious Disease    Code Status  Full Code    Disposition  Pending guardianship hearing 1/29/21.    Review of Systems  Review of Systems   Constitutional: Negative for chills, fever and malaise/fatigue.   Respiratory: Negative for cough and shortness of breath.    Cardiovascular: Positive for leg swelling (improving). Negative for chest pain and palpitations.   Gastrointestinal: Negative for abdominal pain, constipation, diarrhea, nausea and vomiting.   Genitourinary: Negative for dysuria, frequency and urgency.   Musculoskeletal: Negative for falls.   Skin: Negative for itching.   Neurological: Negative for dizziness, sensory change, speech change, focal weakness, weakness and headaches.   Psychiatric/Behavioral: Positive for depression (at times) and memory loss. Negative for suicidal ideas. The patient is nervous/anxious (at times). The patient does not have insomnia.    All other systems reviewed and are negative.       Physical Exam  Temp:  [36.4 °C (97.6 °F)-36.8 °C (98.2 °F)] 36.8 °C (98.2 °F)  Pulse:  [73-86] 73  Resp:  [17] 17  BP: (112-116)/(57-68) 112/57  SpO2:  [95 %-96 %] 96 %    Physical Exam  Vitals signs and nursing note reviewed. Exam conducted with a chaperone present.   Constitutional:       General: He is awake. He is not  in acute distress.     Appearance: He is underweight. He is ill-appearing. He is not toxic-appearing or diaphoretic.   HENT:      Head: Normocephalic and atraumatic.      Nose: Nose normal.      Mouth/Throat:      Lips: Pink.      Mouth: Mucous membranes are moist.   Eyes:      General: No scleral icterus.     Conjunctiva/sclera: Conjunctivae normal.   Neck:      Musculoskeletal: Normal range of motion.   Cardiovascular:      Rate and Rhythm: Normal rate.   Pulmonary:      Effort: Pulmonary effort is normal.   Abdominal:      General: There is no distension.      Tenderness: There is no abdominal tenderness.   Musculoskeletal:      Right lower leg: No edema.      Left lower leg: No edema.      Comments: Ambulates.   Skin:     General: Skin is warm and dry.      Comments: Wound Vac intact/functioning appropriately   Neurological:      Mental Status: He is alert.      Coordination: Coordination is intact.      Gait: Gait is intact. Gait normal.      Comments: Oriented to himself and hospital   Psychiatric:         Mood and Affect: Mood normal. Affect is labile.         Behavior: Behavior is not aggressive or combative. Behavior is cooperative.         Cognition and Memory: Cognition is impaired. He exhibits impaired recent memory.         Judgment: Judgment is impulsive.      Comments:            Fluids    Intake/Output Summary (Last 24 hours) at 1/30/2021 1108  Last data filed at 1/29/2021 2015  Gross per 24 hour   Intake 240 ml   Output --   Net 240 ml       Laboratory                        Imaging  IR-US GUIDED PIV   Final Result    Ultrasound-guided PERIPHERAL IV INSERTION performed by    qualified nursing staff as above.      IR-MIDLINE CATHETER INSERTION WO GUIDANCE > AGE 3   Final Result                  Ultrasound-guided midline placement performed by qualified nursing staff    as above.          IR-US GUIDED PIV   Final Result    Ultrasound-guided PERIPHERAL IV INSERTION performed by    qualified nursing  staff as above.      IR-MIDLINE CATHETER INSERTION WO GUIDANCE > AGE 3   Final Result                  Ultrasound-guided midline placement performed by qualified nursing staff    as above.          US-KOSTAS SINGLE LEVEL BILAT   Final Result      CT-HEAD W/O   Final Result      No acute intracranial abnormality      DX-TIBIA AND FIBULA LEFT   Final Result      1.  Healed distal metaphyseal fractures of the left fibula and tibia with tibial IM layla in place.      2.  No acute fracture or dislocation.      3.  No radiopaque soft tissue foreign body.      DX-FEMUR-2+ LEFT   Final Result      1.  No radiographic evidence of acute traumatic injury left femur.      2.  Unchanged cortical thickening in the posterior aspect of the distal femoral diaphysis which may represent sequela of prior injury or infection.      3.  No soft tissue foreign body identified.      US-EXTREMITY VENOUS LOWER UNILAT LEFT   Final Result      DX-CHEST-PORTABLE (1 VIEW)   Final Result      No acute cardiopulmonary abnormality.           Assessment/Plan  * Wound of left leg- (present on admission)  Assessment & Plan  -Wound vac - he intermittently dislodges. Requires ongoing education  -Further management per wound care/LPS/ortho.   -Pain control as needed.     Encephalopathy- (present on admission)  Assessment & Plan  -Improved   -Per psychiatry and Dr. Velázquez on 1/11: Patient is incapacitated.  -Guardianship and placement pending.  Patient is contesting guardianship, hearing is now scheduled for 1/29/2021 at 10 AM.  -Repeat cognitive evaluation to be preformed the week of hearing    Psychiatric disorder- (present on admission)  Assessment & Plan  -Unspecified.  -Continue Risperdal and Depakote.  -Psychiatry has consulted and deemed him incapacitated to leave AMA or make medical decisions.  -Pending guardianship, which he is contesting.  -Last cognitive evaluation 8/20.  Court requesting repeat cognitive evaluation prior to hearing at the end of  January.  Patient's medical condition has significantly improved since prior cognitive evaluation as it appeared to have been completed when patient was still having possible effects from alcohol withdrawal.   -SLP repeated Cognitive eval today- continue to recommend 24/7 supervision       Essential hypertension- (present on admission)  Assessment & Plan  -Well controlled on amlodipine.    Flexion contracture of knee, 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)  Assessment & Plan  -Secondary to chronic wound in that area.  -PT/OT.  -Does not seem to limit his mobility.  Is frequently ambulatory.    Infection of wound due to methicillin resistant Staphylococcus aureus (MRSA)- (present on admission)  Assessment & Plan  -History of MRSA.  -Poor compliance with OP wound care. Poor compliance with wound vac at times.   -Continue pain control as needed.  -LPS and wound care following - debridements and wound VAC changes per their recommendations  -Cultures repeated 12/14 - positive for Proteus species, pan sensitive.  Completed regimen of augmentin.     Emphysema/COPD (HCC)- (present on admission)  Assessment & Plan  -Chronic and stable off medication, without acute exacerbation.    Protein malnutrition (HCC)- (present on admission)  Assessment & Plan  -Body mass index is 21.35 kg/m².   -Continue TID supplements.  -Encourage PO intake.       VTE prophylaxis: Ambulatory     I have performed a physical exam and reviewed and updated ROS and Assessment/Plan today 1/30/2021. In review of the previous note there are no changes except as documented above    I certify the patient requires continued medically necessary hospital services for the treatment of non-healing wound and cognitive disorder.  The patient will remain in the hospital for the foreseeable future.  Discharge may or may not occur in the next 20 days due to ongoing discharge delays due to having no medically acceptable discharge options.        LOUISE Candelario.

## 2021-01-30 NOTE — WOUND TEAM
"[facility]  Inpatient Services  Wound and Skin Care Progress Note    Admission Date: 8/7/2020     Last order of IP CONSULT TO WOUND CARE was found on 9/1/2020 from Hospital Encounter on 8/7/2020     HPI, PMH, SH: Reviewed    Unit where seen by Wound Team: [id number]    WOUND CONSULT/FOLLOW UP RELATED TO:  Left leg scheduled negative pressure wound therapy (npwt) dressing change and 2 layer compression wrap change    Self Report / Pain Level: Pt c/o moderate pain at the proximal posterior thigh; yelling out, was still very pleasant and said \"thank you, you did a great job.\" several times.      OBJECTIVE: NPWT dressing and 2 layer compression wrap in place. Patient on pressure redistribution mattress, up self, ambulates frequently, turns self.    WOUND TYPE, LOCATION, CHARACTERISTICS (Pressure Injuries: location, stage, POA or date identified)        Negative Pressure Wound Therapy 11/20/20 Leg Lateral;Posterior;Medial Left (Active)   NPWT Pump Mode / Pressure Setting Ulta;Continuous;125 mmHg 01/29/21 1430   Dressing Type Silver impregnated foam 01/29/21 1430   Number of Foam Pieces Used 5 01/29/21 1430   Canister Changed No 01/29/21 1430   Output (mL) 350 mL 01/16/21 0310   NEXT Dressing Change/Treatment Date 02/01/21 01/29/21 1430           Wound 11/19/20 Full Thickness Wound Leg Lateral;Posterior;Medial Left (Active)   Wound Image    01/27/21 1500   Site Assessment Fully granulated;Red;Yellow 01/29/21 1430   Periwound Assessment Scar tissue;Red 01/29/21 1430   Margins Attached edges;Defined edges 01/29/21 1430   Closure Secondary intention 01/29/21 1430   Drainage Amount Small 01/29/21 1430   Drainage Description Serosanguineous;Sanguineous 01/29/21 1430   Treatments Cleansed;Site care;Compression 01/29/21 1430   Wound Cleansing Approved Wound Cleanser 01/29/21 1430   Periwound Protectant Benzoin;Drape 01/29/21 1430   Dressing Cleansing/Solutions Not Applicable 01/29/21 1430   Dressing Options Wound " Vac;Compression Wrap Two Layer 01/29/21 1430   Dressing Changed Changed 01/29/21 1430   Dressing Status Clean;Dry;Intact 01/29/21 1430   Dressing Change/Treatment Frequency Monday, Wednesday, Friday, and As Needed 01/29/21 1430   NEXT Dressing Change/Treatment Date 02/01/21 01/29/21 1430   NEXT Weekly Photo (Inpatient Only) 02/03/21 01/29/21 1430   Non-staged Wound Description Full thickness 01/29/21 1430   Wound Bed Granulation (%) 100 % 01/29/21 1430   Wound Bed Slough (%) 10 % 01/22/21 0912   Wound Bed Granulation (%) - Post-Procedure 100 % 01/13/21 1100   Shape irregular, crescent 01/29/21 1430   Wound Odor Other (Comments) 01/29/21 1430   Pulses Left;2+;DP;PT 01/27/21 1500   Exposed Structures None 01/29/21 1430        Wounds are no longer connected - measured as 2 wounds - posterior thigh and medial leg.        VASCULAR    Lab Values:    Lab Results   Component Value Date/Time    WBC 6.5 11/22/2020 02:44 AM    RBC 3.54 (L) 11/22/2020 02:44 AM    HEMOGLOBIN 9.9 (L) 11/22/2020 02:44 AM    HEMATOCRIT 30.5 (L) 11/22/2020 02:44 AM    CREACTPROT 1.07 (H) 08/12/2020 01:55 PM    SEDRATEWES 85 (H) 08/07/2020 01:20 PM    HBA1C 5.7 (H) 11/09/2018 06:30 PM        Culture Results show:  Recent Results (from the past 720 hour(s))   CULTURE WOUND W/ GRAM STAIN    Collection Time: 09/01/20  2:00 PM    Specimen: Left Leg; Wound   Result Value Ref Range    Significant Indicator POS (POS)     Source WND     Site LEFT LEG     Culture Result - (A)     Gram Stain Result       Moderate Gram positive cocci.  Moderate Gram positive rods.      Culture Result (A)      Beta Hemolytic Streptococcus group A  Moderate growth      Culture Result (A)      Methicillin Resistant Staphylococcus aureus  Moderate growth      Culture Result (A)      Diphtheroids  Moderate growth  Mixed morphologies.     KOSTAS: 9/20/21   Left.    Doppler waveforms of the common femoral artery are of high amplitude and    triphasic.    Doppler waveforms at the ankle  are brisk and triphasic.    Ankle-brachial index is normal.    Unable to obtained popliteal waveforms due to wound bandages.     Self Report / Pain Level: Pt c/o moderate pain at the most proximal posterior thigh.  Patient pre-medicated prior to removal of dressings.     INTERVENTIONS BY WOUND TEAM: wound care performed per standard procedures  Cleansed with wound cleanser  Debridement - nonselective with gauze 4 x 4s.   Periwound - benzoin and drape  Primary - 3 silver foam to wound beds, one regular black foam for bridge between the 2, one for button, and secured with drape, restarted NPWT 150mmHg, no leaks noted.   Secondary - 2 layer compression wrap foot/leg/thigh    Interdisciplinary consultation: Patient, Bedside RN    EVALUATION / RATIONALE FOR TREATMENT:     01/29/2021: wounds flush with periwound, fully granulated with scant yellow to a few areas. Medial periwound intact, lateral periwound with some erythema. LPS APRN suggested that next time applying brava strips to any irritated periwound may help reduce irritation.   1/27/21: Restarted NPWT, addison-wound looks better.   1/25/21: denuded tissue, vac vacation for until 1/27.  Re-assessment to place wound VAC to left LE.   1/21/21 area has decreased since last measurements.  Odor still present.  Addison skin compromised probably yeast infection under VAC drape.  Will hold VAC for 72 hours and re assess.  Nystatin to address yeast and dry out addison skin  1/15/21: position of leg may affect posterior thigh measurements.  Medial Leg wound area decreased.    01/10/2021: Wound vac replaced, patient accidentally removed vac and bedside RN was unable to repair.  Patient tolerated wound VAC placement. Patient had moderate ss with some green tinge drainage.   01/04/2021: 2 layer compression wrap re-applied to left LE due to ACE wrap leaving too much indentation to left LE.   12/31/2020 thigh wound much narrower.  Biofilm redeveloping and odor still present.   12/27/20:  Wound bed granular and odor has lessen but still persists.   12/18/20 wound length decreased.  Odor persists.    12/14/2020: re-cultured wound bed.  12/11/2020 POD 23 Length of wounds has decreased, width has not changed.  Odor persists.  Pt continues to become angry with VAC when it limits his mobility.    12/7/2020 POD 19 odor persists, improved wound bed after debridement.  Possible bacterial vs yeast vs fungal infection.  Culture taken.  Nystatin to treat possible fungal infection, silver foam to decrease microbial population.    12/4/2020 POD# 16 odor worsening, more yellow tissue present, will add nystatin and silver foam next week and consider a culture  11/30 POD#11 granular buds visible to wound bed.  Same as prior.   11/27 POD#8 wound is odorous likely related to biologic dressing.  No overt signs of infection.  Granular buds are visible through adaptic.  Edges do not appear as thick as they were prior to last sx.  Continue with current treatment.    11/23: POD#4 s/p I&D of left LE wounds and biologic placed by Dr. Olvera on 11/19.  Wound bed is flatter and raised edges scar tissue seems to be gone.   11/14 Posterior thigh aspect of wound deteriorating, will increase frequency of treatment to keep biofilm down and hopefully avoid systemic abx.  Will include thigh in compression wrap.  Will consider culture if improvement is not seen in the next few treatments.    11/10: APRN unavailable at this time.  Will re-schedule excisional debridement to next week.   11/5: Plan for debridement of scarred edges on Tues by LPS. Tendon exposed to posterior aspect of wound, behind knee. Continue with current dressing care.  10/29: Excisional debridement by Asaf ELLER of scar tissue along the proximal edge of the posterior knee and lateral thigh.  Lateral aspect of thigh is flatten.  Medial aspect of knee has thick scar tissue that was excisionally debrided by Asaf ELLER.  Fully granulated throughout the  wound bed.   10/23 wound bed mostly granular, superficial in depth, progress has been slow with the wound VAC so will trial collagen product to encourage new tissue growth.    10/19: wound bed has improved in color and is fully granulated.  Addison-wound has improved, denudation has resolved. APRN continuing with serial weekly debridements as needed.   10/16: wound friable pt now on iv abx per ID.  Indurated edges addressed with drape and foam.  Addison wound likely has yeast infection - addressing with silver powder crusting  10/14: patient seen with Asaf ELLER, stopping veraflo instillation.  Starting silver powder and regular wound VAC to see if it will help with bioburden.  Possible colonization of bacteria.  ID involved.   10/12: Inflammation noted to the proximal part of the wound bed.  Will continue to monitor, possible vac break on Wednesday to help addison-skin inflammation.   10/7: Excisional debridement performed by Asaf ELLER. Will need to continue selective debridement with NPWT changes, and weekly excisional debridement with APRN to flatten out the scar tissue.  IV abx therapy ended 10/5.   10/5: Lateral wound still with some slough, both wounds smaller per measurements. Medial wound with all granular tissue.  9/30: Patient to start abx therapy for 7 days course per Dr. Ellis.  Started dakin's instillation to veraflo  9/28: malodorous today, culture taken.    9/25: Odor noted, though unclear if from wound or pt, consider shower before next Vac change. Consider regular vac next change, wound granular and superficial.   9/23: Patient still awaiting guardianship for placement.   9/18: wound granulating nicely and responding well to current treatment.    9/16: Wound bed with granular tissue, edges are thick but appear better than previous assessments. . NPWT VF to encourage closure by secondary intention and manage drainage while encouraging oxygenation and granulation to the wound bed. 2 layer  compression to assist in decrease of swelling and encourage blood flow to wounds. Wound team to follow up and change 3x/week.      Goals: Steady decrease in wound area and depth weekly.    NURSING PLAN OF CARE ORDERS (X):    Dressing changes: See Dressing Care orders: X  Skin care: See Skin Care orders:   Rectal tube care: See Rectal Tube Care orders:   Other orders:      WOUND TEAM PLAN OF CARE:   Dressing changes by wound team:        Follow up 3 times weekly:                NPWT change 3 times weekly:  X,  NPWT changes 3x/ weekly with ace wrap.  Follow up 1-2 times weekly:      Follow up Bi-Monthly:                   Follow up as needed:       Other (explain):     Anticipated discharge plans:  LTACH:        SNF/Rehab: X - patient will need ongoing wound care for LLE upon discharge - 3x/weekly           Home Health Care:           Outpatient Wound Center:            Self Care:

## 2021-01-31 PROCEDURE — 770001 HCHG ROOM/CARE - MED/SURG/GYN PRIV*

## 2021-01-31 PROCEDURE — A9270 NON-COVERED ITEM OR SERVICE: HCPCS | Performed by: NURSE PRACTITIONER

## 2021-01-31 PROCEDURE — 700102 HCHG RX REV CODE 250 W/ 637 OVERRIDE(OP): Performed by: NURSE PRACTITIONER

## 2021-01-31 PROCEDURE — 700101 HCHG RX REV CODE 250: Performed by: NURSE PRACTITIONER

## 2021-01-31 PROCEDURE — 700111 HCHG RX REV CODE 636 W/ 250 OVERRIDE (IP): Performed by: NURSE PRACTITIONER

## 2021-01-31 RX ADMIN — DIVALPROEX SODIUM 125 MG: 125 CAPSULE ORAL at 21:38

## 2021-01-31 RX ADMIN — DIVALPROEX SODIUM 125 MG: 125 CAPSULE ORAL at 13:17

## 2021-01-31 RX ADMIN — GABAPENTIN 300 MG: 300 CAPSULE ORAL at 05:42

## 2021-01-31 RX ADMIN — HYDROXYZINE HYDROCHLORIDE 25 MG: 50 TABLET, FILM COATED ORAL at 18:24

## 2021-01-31 RX ADMIN — OXYCODONE HYDROCHLORIDE 10 MG: 10 TABLET ORAL at 13:18

## 2021-01-31 RX ADMIN — CYCLOBENZAPRINE 10 MG: 10 TABLET, FILM COATED ORAL at 21:38

## 2021-01-31 RX ADMIN — DIVALPROEX SODIUM 125 MG: 125 CAPSULE ORAL at 05:42

## 2021-01-31 RX ADMIN — RISPERIDONE 1 MG: 1 TABLET ORAL at 18:24

## 2021-01-31 RX ADMIN — OXYCODONE HYDROCHLORIDE 10 MG: 10 TABLET ORAL at 21:38

## 2021-01-31 RX ADMIN — AMLODIPINE BESYLATE 5 MG: 5 TABLET ORAL at 05:42

## 2021-01-31 RX ADMIN — RISPERIDONE 0.5 MG: 0.5 TABLET ORAL at 05:42

## 2021-01-31 RX ADMIN — GABAPENTIN 300 MG: 300 CAPSULE ORAL at 18:24

## 2021-01-31 RX ADMIN — CYCLOBENZAPRINE 10 MG: 10 TABLET, FILM COATED ORAL at 15:39

## 2021-01-31 RX ADMIN — LIDOCAINE 1 PATCH: 50 PATCH CUTANEOUS at 13:18

## 2021-01-31 RX ADMIN — ENOXAPARIN SODIUM 40 MG: 40 INJECTION SUBCUTANEOUS at 05:41

## 2021-01-31 RX ADMIN — GABAPENTIN 300 MG: 300 CAPSULE ORAL at 13:18

## 2021-01-31 ASSESSMENT — PAIN DESCRIPTION - PAIN TYPE
TYPE: ACUTE PAIN;SURGICAL PAIN
TYPE: ACUTE PAIN;SURGICAL PAIN

## 2021-02-01 PROCEDURE — 700102 HCHG RX REV CODE 250 W/ 637 OVERRIDE(OP): Performed by: NURSE PRACTITIONER

## 2021-02-01 PROCEDURE — 700111 HCHG RX REV CODE 636 W/ 250 OVERRIDE (IP): Performed by: NURSE PRACTITIONER

## 2021-02-01 PROCEDURE — A9270 NON-COVERED ITEM OR SERVICE: HCPCS | Performed by: NURSE PRACTITIONER

## 2021-02-01 PROCEDURE — 97606 NEG PRS WND THER DME>50 SQCM: CPT

## 2021-02-01 PROCEDURE — 700101 HCHG RX REV CODE 250: Performed by: NURSE PRACTITIONER

## 2021-02-01 PROCEDURE — 770001 HCHG ROOM/CARE - MED/SURG/GYN PRIV*

## 2021-02-01 RX ADMIN — CYCLOBENZAPRINE 10 MG: 10 TABLET, FILM COATED ORAL at 20:46

## 2021-02-01 RX ADMIN — RISPERIDONE 1 MG: 1 TABLET ORAL at 17:27

## 2021-02-01 RX ADMIN — OXYCODONE HYDROCHLORIDE 10 MG: 10 TABLET ORAL at 17:27

## 2021-02-01 RX ADMIN — OXYCODONE HYDROCHLORIDE 10 MG: 10 TABLET ORAL at 09:28

## 2021-02-01 RX ADMIN — AMLODIPINE BESYLATE 5 MG: 5 TABLET ORAL at 05:16

## 2021-02-01 RX ADMIN — DIVALPROEX SODIUM 125 MG: 125 CAPSULE ORAL at 05:16

## 2021-02-01 RX ADMIN — GABAPENTIN 300 MG: 300 CAPSULE ORAL at 17:27

## 2021-02-01 RX ADMIN — LIDOCAINE 1 PATCH: 50 PATCH CUTANEOUS at 12:18

## 2021-02-01 RX ADMIN — ENOXAPARIN SODIUM 40 MG: 40 INJECTION SUBCUTANEOUS at 05:16

## 2021-02-01 RX ADMIN — CYCLOBENZAPRINE 10 MG: 10 TABLET, FILM COATED ORAL at 12:18

## 2021-02-01 RX ADMIN — DIVALPROEX SODIUM 125 MG: 125 CAPSULE ORAL at 15:06

## 2021-02-01 RX ADMIN — DIVALPROEX SODIUM 125 MG: 125 CAPSULE ORAL at 20:46

## 2021-02-01 RX ADMIN — GABAPENTIN 300 MG: 300 CAPSULE ORAL at 12:18

## 2021-02-01 RX ADMIN — HYDROXYZINE HYDROCHLORIDE 25 MG: 50 TABLET, FILM COATED ORAL at 15:06

## 2021-02-01 RX ADMIN — RISPERIDONE 0.5 MG: 0.5 TABLET ORAL at 05:16

## 2021-02-01 RX ADMIN — GABAPENTIN 300 MG: 300 CAPSULE ORAL at 05:16

## 2021-02-01 NOTE — CARE PLAN
"Problem: Discharge Barriers/Planning  Goal: Patient's continuum of care needs will be met  Outcome: PROGRESSING AS EXPECTED  Patient's guardianship has been granted. Awaiting group home acceptance/placement.     Problem: Pain Management  Goal: Pain level will decrease to patient's comfort goal  Outcome: PROGRESSING SLOWER THAN EXPECTED  Patient is forgetful and asks for oxycodone often. Patient always states \"10\" of 10 pain when asked pain scale although nonverbal cues suggest a lower pain scale.  "

## 2021-02-02 PROCEDURE — 700102 HCHG RX REV CODE 250 W/ 637 OVERRIDE(OP): Performed by: NURSE PRACTITIONER

## 2021-02-02 PROCEDURE — A9270 NON-COVERED ITEM OR SERVICE: HCPCS | Performed by: NURSE PRACTITIONER

## 2021-02-02 PROCEDURE — 700101 HCHG RX REV CODE 250: Performed by: NURSE PRACTITIONER

## 2021-02-02 PROCEDURE — 770001 HCHG ROOM/CARE - MED/SURG/GYN PRIV*

## 2021-02-02 PROCEDURE — 700111 HCHG RX REV CODE 636 W/ 250 OVERRIDE (IP): Performed by: NURSE PRACTITIONER

## 2021-02-02 RX ADMIN — HYDROXYZINE HYDROCHLORIDE 25 MG: 50 TABLET, FILM COATED ORAL at 00:55

## 2021-02-02 RX ADMIN — CYCLOBENZAPRINE 10 MG: 10 TABLET, FILM COATED ORAL at 17:51

## 2021-02-02 RX ADMIN — LIDOCAINE 1 PATCH: 50 PATCH CUTANEOUS at 12:27

## 2021-02-02 RX ADMIN — RISPERIDONE 0.5 MG: 0.5 TABLET ORAL at 05:42

## 2021-02-02 RX ADMIN — DIVALPROEX SODIUM 125 MG: 125 CAPSULE ORAL at 05:42

## 2021-02-02 RX ADMIN — OXYCODONE HYDROCHLORIDE 10 MG: 10 TABLET ORAL at 14:12

## 2021-02-02 RX ADMIN — AMLODIPINE BESYLATE 5 MG: 5 TABLET ORAL at 05:42

## 2021-02-02 RX ADMIN — GABAPENTIN 300 MG: 300 CAPSULE ORAL at 12:27

## 2021-02-02 RX ADMIN — ENOXAPARIN SODIUM 40 MG: 40 INJECTION SUBCUTANEOUS at 05:42

## 2021-02-02 RX ADMIN — OXYCODONE HYDROCHLORIDE 10 MG: 10 TABLET ORAL at 07:23

## 2021-02-02 RX ADMIN — DIVALPROEX SODIUM 125 MG: 125 CAPSULE ORAL at 20:06

## 2021-02-02 RX ADMIN — RISPERIDONE 1 MG: 1 TABLET ORAL at 20:05

## 2021-02-02 RX ADMIN — GABAPENTIN 300 MG: 300 CAPSULE ORAL at 17:48

## 2021-02-02 RX ADMIN — GABAPENTIN 300 MG: 300 CAPSULE ORAL at 05:42

## 2021-02-02 ASSESSMENT — PAIN DESCRIPTION - PAIN TYPE
TYPE: ACUTE PAIN

## 2021-02-02 NOTE — WOUND TEAM
"[facility]  Inpatient Services  Wound and Skin Care Progress Note    Admission Date: 8/7/2020     Last order of IP CONSULT TO WOUND CARE was found on 9/1/2020 from Hospital Encounter on 8/7/2020     HPI, PMH, SH: Reviewed    Unit where seen by Wound Team: [id number]    WOUND CONSULT/FOLLOW UP RELATED TO:  Left leg scheduled negative pressure wound therapy (npwt) dressing change and 2 layer compression wrap change     Self Report / Pain Level: Pt c/o moderate pain at the proximal posterior thigh; yelling out, was still very pleasant and said \"thank you, you did a great job.\" several times.      OBJECTIVE: NPWT dressing and 2 layer compression wrap in place. Patient on pressure redistribution mattress, up self, ambulates frequently, turns self.    WOUND TYPE, LOCATION, CHARACTERISTICS (Pressure Injuries: location, stage, POA or date identified)      Negative Pressure Wound Therapy 11/20/20 Leg Lateral;Posterior;Medial Left (Active)   NPWT Pump Mode / Pressure Setting Ulta;Continuous;125 mmHg    Dressing Type Silver impregnated foam;Black Foam (Regular);Medium    Number of Foam Pieces Used 4    Canister Changed No    Output (mL) 350 mL    NEXT Dressing Change/Treatment Date 02/03/21    WOUND NURSE ONLY - Time Spent with Patient (mins) 75      Wound 11/19/20 Full Thickness Wound Leg Lateral;Posterior;Medial Left (Active)   Wound Image    01/27/21 1500   Site Assessment Pink;Red;Yellow    Periwound Assessment Intact    Margins Attached edges    Closure None    Drainage Amount Small    Drainage Description Serosanguineous    Treatments Cleansed;Compression    Wound Cleansing Approved Wound Cleanser    Periwound Protectant Skin Protectant Wipes to Periwound;Benzoin    Dressing Cleansing/Solutions Not Applicable    Dressing Options Wound Vac    Dressing Changed Changed    Dressing Status Clean;Dry;Intact    Dressing Change/Treatment Frequency Monday, Wednesday, Friday, and As Needed    NEXT Dressing " Change/Treatment Date 02/03/21    NEXT Weekly Photo (Inpatient Only) 02/03/21    Non-staged Wound Description Full thickness    Wound Bed Granulation (%) 100 %    Wound Bed Slough (%) 10 %    Wound Bed Granulation (%) - Post-Procedure 100 %    Shape irregular, crescent    Wound Odor Other (Comments)    Pulses Left;2+;DP;PT    Exposed Structures None    WOUND NURSE ONLY - Time Spent with Patient (mins) 45       Wounds are no longer connected - measured as 2 wounds - posterior thigh and medial leg.        VASCULAR    Lab Values:    Lab Results   Component Value Date/Time    WBC 6.5 11/22/2020 02:44 AM    RBC 3.54 (L) 11/22/2020 02:44 AM    HEMOGLOBIN 9.9 (L) 11/22/2020 02:44 AM    HEMATOCRIT 30.5 (L) 11/22/2020 02:44 AM    CREACTPROT 1.07 (H) 08/12/2020 01:55 PM    SEDRATEWES 85 (H) 08/07/2020 01:20 PM    HBA1C 5.7 (H) 11/09/2018 06:30 PM        Culture Results show:  Recent Results (from the past 720 hour(s))   CULTURE WOUND W/ GRAM STAIN    Collection Time: 09/01/20  2:00 PM    Specimen: Left Leg; Wound   Result Value Ref Range    Significant Indicator POS (POS)     Source WND     Site LEFT LEG     Culture Result - (A)     Gram Stain Result       Moderate Gram positive cocci.  Moderate Gram positive rods.      Culture Result (A)      Beta Hemolytic Streptococcus group A  Moderate growth      Culture Result (A)      Methicillin Resistant Staphylococcus aureus  Moderate growth      Culture Result (A)      Diphtheroids  Moderate growth  Mixed morphologies.     KOSTAS: 9/20/21   Left.    Doppler waveforms of the common femoral artery are of high amplitude and    triphasic.    Doppler waveforms at the ankle are brisk and triphasic.    Ankle-brachial index is normal.    Unable to obtained popliteal waveforms due to wound bandages.     Self Report / Pain Level: Pt c/o moderate pain at the most proximal posterior thigh.  Patient pre-medicated prior to removal of dressings.     INTERVENTIONS BY WOUND TEAM: wound care performed  per standard procedures  Cleansed with wound cleanser  Debridement - nonselective with gauze 4 x 4s.   Periwound - no sting skin prep.  benzoin and drape  Primary - 3 silver foam to wound beds, one regular black foam for bridge between the 2, one for button, and secured with drape, restarted NPWT 150mmHg, no leaks noted.   Secondary - 2 layer compression wrap foot/leg/thigh    Interdisciplinary consultation: Patient, Bedside RN, Rai wound RN    EVALUATION / RATIONALE FOR TREATMENT:     02/01/21:  No change.  Wounds appear to be improving slowly.      01/29/2021: wounds flush with periwound, fully granulated with scant yellow to a few areas. Medial periwound intact, lateral periwound with some erythema. LPS APRN suggested that next time applying brava strips to any irritated periwound may help reduce irritation.   1/27/21: Restarted NPWT, meredith-wound looks better.   1/25/21: denuded tissue, vac vacation for until 1/27.  Re-assessment to place wound VAC to left LE.   1/21/21 area has decreased since last measurements.  Odor still present.  Meredith skin compromised probably yeast infection under VAC drape.  Will hold VAC for 72 hours and re assess.  Nystatin to address yeast and dry out meredith skin  1/15/21: position of leg may affect posterior thigh measurements.  Medial Leg wound area decreased.    01/10/2021: Wound vac replaced, patient accidentally removed vac and bedside RN was unable to repair.  Patient tolerated wound VAC placement. Patient had moderate ss with some green tinge drainage.   01/04/2021: 2 layer compression wrap re-applied to left LE due to ACE wrap leaving too much indentation to left LE.   12/31/2020 thigh wound much narrower.  Biofilm redeveloping and odor still present.   12/27/20: Wound bed granular and odor has lessen but still persists.   12/18/20 wound length decreased.  Odor persists.    12/14/2020: re-cultured wound bed.  12/11/2020 POD 23 Length of wounds has decreased, width has not changed.   Odor persists.  Pt continues to become angry with VAC when it limits his mobility.    12/7/2020 POD 19 odor persists, improved wound bed after debridement.  Possible bacterial vs yeast vs fungal infection.  Culture taken.  Nystatin to treat possible fungal infection, silver foam to decrease microbial population.    12/4/2020 POD# 16 odor worsening, more yellow tissue present, will add nystatin and silver foam next week and consider a culture  11/30 POD#11 granular buds visible to wound bed.  Same as prior.   11/27 POD#8 wound is odorous likely related to biologic dressing.  No overt signs of infection.  Granular buds are visible through adaptic.  Edges do not appear as thick as they were prior to last sx.  Continue with current treatment.    11/23: POD#4 s/p I&D of left LE wounds and biologic placed by Dr. Olvera on 11/19.  Wound bed is flatter and raised edges scar tissue seems to be gone.   11/14 Posterior thigh aspect of wound deteriorating, will increase frequency of treatment to keep biofilm down and hopefully avoid systemic abx.  Will include thigh in compression wrap.  Will consider culture if improvement is not seen in the next few treatments.    11/10: APRN unavailable at this time.  Will re-schedule excisional debridement to next week.   11/5: Plan for debridement of scarred edges on Tues by LPS. Tendon exposed to posterior aspect of wound, behind knee. Continue with current dressing care.  10/29: Excisional debridement by Asaf ELLER of scar tissue along the proximal edge of the posterior knee and lateral thigh.  Lateral aspect of thigh is flatten.  Medial aspect of knee has thick scar tissue that was excisionally debrided by Asaf ELLER.  Fully granulated throughout the wound bed.   10/23 wound bed mostly granular, superficial in depth, progress has been slow with the wound VAC so will trial collagen product to encourage new tissue growth.    10/19: wound bed has improved in color and is  fully granulated.  Addison-wound has improved, denudation has resolved. APRN continuing with serial weekly debridements as needed.   10/16: wound friable pt now on iv abx per ID.  Indurated edges addressed with drape and foam.  Addison wound likely has yeast infection - addressing with silver powder crusting  10/14: patient seen with Asaf ELLER, stopping veraflo instillation.  Starting silver powder and regular wound VAC to see if it will help with bioburden.  Possible colonization of bacteria.  ID involved.   10/12: Inflammation noted to the proximal part of the wound bed.  Will continue to monitor, possible vac break on Wednesday to help addison-skin inflammation.   10/7: Excisional debridement performed by Asaf ELLER. Will need to continue selective debridement with NPWT changes, and weekly excisional debridement with APRN to flatten out the scar tissue.  IV abx therapy ended 10/5.   10/5: Lateral wound still with some slough, both wounds smaller per measurements. Medial wound with all granular tissue.  9/30: Patient to start abx therapy for 7 days course per Dr. Ellis.  Started dakin's instillation to veraflo  9/28: malodorous today, culture taken.    9/25: Odor noted, though unclear if from wound or pt, consider shower before next Vac change. Consider regular vac next change, wound granular and superficial.   9/23: Patient still awaiting guardianship for placement.   9/18: wound granulating nicely and responding well to current treatment.    9/16: Wound bed with granular tissue, edges are thick but appear better than previous assessments. . NPWT VF to encourage closure by secondary intention and manage drainage while encouraging oxygenation and granulation to the wound bed. 2 layer compression to assist in decrease of swelling and encourage blood flow to wounds. Wound team to follow up and change 3x/week.      Goals: Steady decrease in wound area and depth weekly.    NURSING PLAN OF CARE ORDERS  (X):    Dressing changes: See Dressing Care orders: X  Skin care: See Skin Care orders:   Rectal tube care: See Rectal Tube Care orders:   Other orders:      WOUND TEAM PLAN OF CARE:   Dressing changes by wound team:        Follow up 3 times weekly:                NPWT change 3 times weekly:  X,  NPWT changes 3x/ weekly with ace wrap.  Follow up 1-2 times weekly:      Follow up Bi-Monthly:                   Follow up as needed:       Other (explain):     Anticipated discharge plans:  LTACH:        SNF/Rehab: X - patient will need ongoing wound care for LLE upon discharge - 3x/weekly           Home Health Care:           Outpatient Wound Center:            Self Care:

## 2021-02-02 NOTE — DISCHARGE PLANNING
Received notification from  that guardian has been assigned. Meena Castle [facility] Public Guardian [address] [address] [place] [id number]. Document will be scanned into system.

## 2021-02-03 PROCEDURE — 700102 HCHG RX REV CODE 250 W/ 637 OVERRIDE(OP): Performed by: NURSE PRACTITIONER

## 2021-02-03 PROCEDURE — 770001 HCHG ROOM/CARE - MED/SURG/GYN PRIV*

## 2021-02-03 PROCEDURE — A9270 NON-COVERED ITEM OR SERVICE: HCPCS | Performed by: NURSE PRACTITIONER

## 2021-02-03 PROCEDURE — 700101 HCHG RX REV CODE 250: Performed by: NURSE PRACTITIONER

## 2021-02-03 PROCEDURE — 700111 HCHG RX REV CODE 636 W/ 250 OVERRIDE (IP): Performed by: NURSE PRACTITIONER

## 2021-02-03 PROCEDURE — 97606 NEG PRS WND THER DME>50 SQCM: CPT

## 2021-02-03 PROCEDURE — 99231 SBSQ HOSP IP/OBS SF/LOW 25: CPT | Performed by: NURSE PRACTITIONER

## 2021-02-03 RX ADMIN — OXYCODONE HYDROCHLORIDE 10 MG: 10 TABLET ORAL at 17:28

## 2021-02-03 RX ADMIN — DIVALPROEX SODIUM 125 MG: 125 CAPSULE ORAL at 13:54

## 2021-02-03 RX ADMIN — RISPERIDONE 0.5 MG: 0.5 TABLET ORAL at 05:02

## 2021-02-03 RX ADMIN — DIVALPROEX SODIUM 125 MG: 125 CAPSULE ORAL at 05:02

## 2021-02-03 RX ADMIN — ENOXAPARIN SODIUM 40 MG: 40 INJECTION SUBCUTANEOUS at 05:02

## 2021-02-03 RX ADMIN — AMLODIPINE BESYLATE 5 MG: 5 TABLET ORAL at 05:02

## 2021-02-03 RX ADMIN — OXYCODONE HYDROCHLORIDE 10 MG: 10 TABLET ORAL at 23:03

## 2021-02-03 RX ADMIN — GABAPENTIN 300 MG: 300 CAPSULE ORAL at 17:26

## 2021-02-03 RX ADMIN — LIDOCAINE 1 PATCH: 50 PATCH CUTANEOUS at 11:13

## 2021-02-03 RX ADMIN — DIVALPROEX SODIUM 125 MG: 125 CAPSULE ORAL at 21:39

## 2021-02-03 RX ADMIN — OXYCODONE HYDROCHLORIDE 10 MG: 10 TABLET ORAL at 09:30

## 2021-02-03 RX ADMIN — GABAPENTIN 300 MG: 300 CAPSULE ORAL at 11:13

## 2021-02-03 RX ADMIN — GABAPENTIN 300 MG: 300 CAPSULE ORAL at 05:02

## 2021-02-03 RX ADMIN — RISPERIDONE 1 MG: 1 TABLET ORAL at 17:26

## 2021-02-03 ASSESSMENT — PAIN DESCRIPTION - PAIN TYPE: TYPE: ACUTE PAIN

## 2021-02-03 NOTE — WOUND TEAM
"[facility]  Inpatient Services  Wound and Skin Care Progress Note    Admission Date: 8/7/2020     Last order of IP CONSULT TO WOUND CARE was found on 9/1/2020 from Hospital Encounter on 8/7/2020     HPI, PMH, SH: Reviewed    Unit where seen by Wound Team: [id number]    WOUND CONSULT/FOLLOW UP RELATED TO:  Left leg scheduled negative pressure wound therapy (npwt) dressing change and 2 layer compression wrap change     Self Report / Pain Level: Pt c/o moderate pain at the proximal posterior thigh; yelling out, was still very pleasant and said \"thank you, you did a great job.\" several times.      OBJECTIVE: NPWT dressing and 2 layer compression wrap in place. Patient on pressure redistribution mattress, up self, ambulates frequently, turns self.    WOUND TYPE, LOCATION, CHARACTERISTICS (Pressure Injuries: location, stage, POA or date identified)    Negative Pressure Wound Therapy 11/20/20 Leg Lateral;Posterior;Medial Left (Active)   NPWT Pump Mode / Pressure Setting Ulta;Continuous;125 mmHg 02/03/21 1100   Dressing Type Silver impregnated foam 02/03/21 1100   Number of Foam Pieces Used 4 02/03/21 1100   Canister Changed Yes 02/03/21 1100   Output (mL) 500 mL 02/03/21 1100   NEXT Dressing Change/Treatment Date 02/05/21 02/03/21 1100   WOUND NURSE ONLY - Time Spent with Patient (mins) 75 01/18/21 1600           Wound 11/19/20 Full Thickness Wound Leg Lateral;Posterior;Medial Left (Active)   Wound Image    02/03/21 1100   Site Assessment RONNI 02/03/21 0800   Periwound Assessment RONNI 02/02/21 2000   Margins Attached edges 02/01/21 1700   Closure None 02/01/21 1700   Drainage Amount Small 02/01/21 1700   Drainage Description Serosanguineous 02/01/21 1700   Treatments Cleansed;Compression 02/01/21 1700   Wound Cleansing Approved Wound Cleanser 02/01/21 1700   Periwound Protectant Skin Protectant Wipes to Periwound;Benzoin 02/01/21 1700   Dressing Cleansing/Solutions Not Applicable 02/01/21 1700   Dressing Options " Wound Vac 02/03/21 0800   Dressing Changed Changed 02/01/21 1700   Dressing Status Clean;Dry;Intact 02/01/21 1700   Dressing Change/Treatment Frequency Monday, Wednesday, Friday, and As Needed 02/01/21 1700   NEXT Dressing Change/Treatment Date 02/03/21 02/01/21 1700   NEXT Weekly Photo (Inpatient Only) 02/03/21 02/01/21 1700   Non-staged Wound Description Full thickness 02/03/21 1100   Wound Length (cm) 15 cm 02/03/21 1100   Wound Width (cm) 3.4 cm 02/03/21 1100   Wound Depth (cm) 0.2 cm 02/03/21 1100   Wound Surface Area (cm^2) 51 cm^2 02/03/21 1100   Wound Volume (cm^3) 10.2 cm^3 02/03/21 1100   Post-Procedure Length (cm) 15 cm 02/03/21 1100   Post-Procedure Width (cm) 3.4 cm 02/03/21 1100   Post-Procedure Depth (cm) 0.2 cm 02/03/21 1100   Post-Procedure Surface Area (cm^2) 51 cm^2 02/03/21 1100   Post-Procedure Volume (cm^3) 10.2 cm^3 02/03/21 1100   Wound Bed Granulation (%) 100 % 02/03/21 1100   Wound Bed Slough (%) 10 % 01/22/21 0912   Wound Bed Granulation (%) - Post-Procedure 100 % 02/03/21 1100   Shape Irregular, 2 separate wounds cressant shaped 02/03/21 1100   Wound Odor Strong 02/03/21 1100   Pulses Left;2+;DP;PT 02/03/21 1100   Exposed Structures None 02/03/21 1100   WOUND NURSE ONLY - Time Spent with Patient (mins) 60 02/03/21 1100       Wounds are no longer connected - measured as 2 wounds - posterior thigh and medial leg.        VASCULAR    Lab Values:    Lab Results   Component Value Date/Time    WBC 6.5 11/22/2020 02:44 AM    RBC 3.54 (L) 11/22/2020 02:44 AM    HEMOGLOBIN 9.9 (L) 11/22/2020 02:44 AM    HEMATOCRIT 30.5 (L) 11/22/2020 02:44 AM    CREACTPROT 1.07 (H) 08/12/2020 01:55 PM    SEDRATEWES 85 (H) 08/07/2020 01:20 PM    HBA1C 5.7 (H) 11/09/2018 06:30 PM        Culture Results show:  Recent Results (from the past 720 hour(s))   CULTURE WOUND W/ GRAM STAIN    Collection Time: 09/01/20  2:00 PM    Specimen: Left Leg; Wound   Result Value Ref Range    Significant Indicator POS (POS)     Source  WND     Site LEFT LEG     Culture Result - (A)     Gram Stain Result       Moderate Gram positive cocci.  Moderate Gram positive rods.      Culture Result (A)      Beta Hemolytic Streptococcus group A  Moderate growth      Culture Result (A)      Methicillin Resistant Staphylococcus aureus  Moderate growth      Culture Result (A)      Diphtheroids  Moderate growth  Mixed morphologies.     KOSTAS: 9/20/21   Left.    Doppler waveforms of the common femoral artery are of high amplitude and    triphasic.    Doppler waveforms at the ankle are brisk and triphasic.    Ankle-brachial index is normal.    Unable to obtained popliteal waveforms due to wound bandages.     Self Report / Pain Level: Pt c/o moderate pain at the most proximal posterior thigh.  Patient pre-medicated with PO oxycodone prior to removal of dressings.     INTERVENTIONS BY WOUND TEAM: wound care performed per standard procedures  Cleansed: Warm washcloth with foam soap to addison-skin, wound cleanser to wound bed, cleansed with normal saline.   Debridement - CSWD with curette to remove ~75.0cm2 of non-viable slough from wound beds.  Periwound - no sting skin prep and drape  Primary - 3 silver foam to wound beds(2 to medial, 1 to posterior thigh, one regular black foam for bridge between the 2, and secured with drape, restarted NPWT 150mmHg, no leaks noted.   Secondary - 2 layer compression wrap foot/leg/thigh    Interdisciplinary consultation: Patient, Bedside RN (Sue)    EVALUATION / RATIONALE FOR TREATMENT:     02/03/21: Measurements are smaller for both medial and posterior thigh.  Wound bed has granulation and non-viable slough.  Will continue to debride every time with wound VAC changes to decrease bioburden in wound bed.   02/01/21:  No change.  Wounds appear to be improving slowly.      01/29/2021: wounds flush with periwound, fully granulated with scant yellow to a few areas. Medial periwound intact, lateral periwound with some erythema. LPS APRN  suggested that next time applying brava strips to any irritated periwound may help reduce irritation.   1/27/21: Restarted NPWT, addison-wound looks better.   1/25/21: denuded tissue, vac vacation for until 1/27.  Re-assessment to place wound VAC to left LE.   1/21/21 area has decreased since last measurements.  Odor still present.  Addison skin compromised probably yeast infection under VAC drape.  Will hold VAC for 72 hours and re assess.  Nystatin to address yeast and dry out addison skin  1/15/21: position of leg may affect posterior thigh measurements.  Medial Leg wound area decreased.    01/10/2021: Wound vac replaced, patient accidentally removed vac and bedside RN was unable to repair.  Patient tolerated wound VAC placement. Patient had moderate ss with some green tinge drainage.   01/04/2021: 2 layer compression wrap re-applied to left LE due to ACE wrap leaving too much indentation to left LE.   12/31/2020 thigh wound much narrower.  Biofilm redeveloping and odor still present.   12/27/20: Wound bed granular and odor has lessen but still persists.   12/18/20 wound length decreased.  Odor persists.    12/14/2020: re-cultured wound bed.  12/11/2020 POD 23 Length of wounds has decreased, width has not changed.  Odor persists.  Pt continues to become angry with VAC when it limits his mobility.    12/7/2020 POD 19 odor persists, improved wound bed after debridement.  Possible bacterial vs yeast vs fungal infection.  Culture taken.  Nystatin to treat possible fungal infection, silver foam to decrease microbial population.    12/4/2020 POD# 16 odor worsening, more yellow tissue present, will add nystatin and silver foam next week and consider a culture  11/30 POD#11 granular buds visible to wound bed.  Same as prior.   11/27 POD#8 wound is odorous likely related to biologic dressing.  No overt signs of infection.  Granular buds are visible through adaptic.  Edges do not appear as thick as they were prior to last sx.   Continue with current treatment.    11/23: POD#4 s/p I&D of left LE wounds and biologic placed by Dr. Olvera on 11/19.  Wound bed is flatter and raised edges scar tissue seems to be gone.   11/14 Posterior thigh aspect of wound deteriorating, will increase frequency of treatment to keep biofilm down and hopefully avoid systemic abx.  Will include thigh in compression wrap.  Will consider culture if improvement is not seen in the next few treatments.    11/10: APRN unavailable at this time.  Will re-schedule excisional debridement to next week.   11/5: Plan for debridement of scarred edges on Tues by LPS. Tendon exposed to posterior aspect of wound, behind knee. Continue with current dressing care.  10/29: Excisional debridement by Asaf ELLER of scar tissue along the proximal edge of the posterior knee and lateral thigh.  Lateral aspect of thigh is flatten.  Medial aspect of knee has thick scar tissue that was excisionally debrided by Asaf ELLER.  Fully granulated throughout the wound bed.   10/23 wound bed mostly granular, superficial in depth, progress has been slow with the wound VAC so will trial collagen product to encourage new tissue growth.    10/19: wound bed has improved in color and is fully granulated.  Addison-wound has improved, denudation has resolved. APRN continuing with serial weekly debridements as needed.   10/16: wound friable pt now on iv abx per ID.  Indurated edges addressed with drape and foam.  Addison wound likely has yeast infection - addressing with silver powder crusting  10/14: patient seen with Asaf ELLER, stopping veraflo instillation.  Starting silver powder and regular wound VAC to see if it will help with bioburden.  Possible colonization of bacteria.  ID involved.   10/12: Inflammation noted to the proximal part of the wound bed.  Will continue to monitor, possible vac break on Wednesday to help addison-skin inflammation.   10/7: Excisional debridement performed by  Asaf Rendon APRN. Will need to continue selective debridement with NPWT changes, and weekly excisional debridement with APRN to flatten out the scar tissue.  IV abx therapy ended 10/5.   10/5: Lateral wound still with some slough, both wounds smaller per measurements. Medial wound with all granular tissue.  9/30: Patient to start abx therapy for 7 days course per Dr. Ellis.  Started dakin's instillation to veraflo  9/28: malodorous today, culture taken.    9/25: Odor noted, though unclear if from wound or pt, consider shower before next Vac change. Consider regular vac next change, wound granular and superficial.   9/23: Patient still awaiting guardianship for placement.   9/18: wound granulating nicely and responding well to current treatment.    9/16: Wound bed with granular tissue, edges are thick but appear better than previous assessments. . NPWT VF to encourage closure by secondary intention and manage drainage while encouraging oxygenation and granulation to the wound bed. 2 layer compression to assist in decrease of swelling and encourage blood flow to wounds. Wound team to follow up and change 3x/week.      Goals: Steady decrease in wound area and depth weekly.    NURSING PLAN OF CARE ORDERS (X):    Dressing changes: See Dressing Care orders: X  Skin care: See Skin Care orders:   Rectal tube care: See Rectal Tube Care orders:   Other orders:      WOUND TEAM PLAN OF CARE:   Dressing changes by wound team:        Follow up 3 times weekly:                NPWT change 3 times weekly:  X,  NPWT changes 3x/ weekly with 2 layer compression wrap.  Follow up 1-2 times weekly:      Follow up Bi-Monthly:                   Follow up as needed:       Other (explain):     Anticipated discharge plans:  LTACH:        SNF/Rehab: X - patient will need ongoing wound care for LLE upon discharge - 3x/weekly           Home Health Care:           Outpatient Wound Center:            Self Care:

## 2021-02-03 NOTE — PROGRESS NOTES
Hospital Medicine Daily Progress Note    Date of Service  2/3/2021    Chief Complaint  Wound Infection    Hospital Course  Mr. Varela is a 65-year-old male with PMH alcohol dependence, tobacco dependence, psychiatric disorder, and HTN who presented to the emergency department 8/7/2020 with a left lower extremity wound infection. He was hospitalized at our facility in April and evaluated by orthopedic surgery who recommended wound care. Wound cultures at that time grew MSSA and Streptococcus. He had been seen at [facility] earlier that week and prescribed antibiotics, however he had not filled the prescription.  An ultrasound of that extremity was done and was negative for DVT.  He was treated for sepsis and completed an antibiotic course on 9/16/2020. He was also found to have head lice and underwent treatment for that.  A repeat wound culture was done on 9/28/2020 and grew Strep A and Proteus. Infectious disease was consulted and recommended a 5-day course of IV zosyn which was completed on 10/5/2020. On 10/12/2020 the wound care team noticed increased inflammation to the proximal part of the wound bed and switched from a vera flow wound VAC to a regular wound VAC.  ID was again consulted and on 10/14/2020 recommended to 7-day course of antibiotics with meropenem which was completed on 10/22/2020. Additionally, he was noted to have intermittent agitation so was started on risperdal, depakote, and gabapentin per psychiatric recommendations.  On 11/20/2020 he underwent left lower extremity debridement with wound VAC placement. Since then he has remained stable.  He has been deemed incapacitated to make medical decisions and guardianship was granted on 1/29/21. Placement will likely be in a group home.       Interval Problem Update  Patient lying in bed, does not arouse to voice.  He has been premedicated for dressing change.  He does withdraw to pain when wound VAC dressing is removed.  He does not answer  assessment questions and declines physical assessment at this time.  -Leg wound significantly improved.  No signs or symptoms of infection.  -Continue wound care     Consultants/Specialty  -LPS  -Psychiatry  -Infectious Disease    Code Status  Full Code    Disposition  guardianship granted 1/29/21. SW/CM may attempt to start working on placement.    Review of Systems  Review of Systems   Unable to perform ROS: Medical condition   Constitutional: Positive for malaise/fatigue.   Cardiovascular: Leg swelling: improving.   Psychiatric/Behavioral: Depression: at times. Nervous/anxious: at times.    All other systems reviewed and are negative.       Physical Exam  Temp:  [36.1 °C (97 °F)-36.8 °C (98.2 °F)] 36.7 °C (98 °F)  Pulse:  [70-91] 91  Resp:  [14-18] 16  BP: (114-132)/(66-78) 117/78  SpO2:  [92 %-96 %] 92 %    Physical Exam  Vitals signs and nursing note reviewed. Exam conducted with a chaperone present.   Constitutional:       General: He is sleeping. He is not in acute distress.     Appearance: He is underweight. He is ill-appearing. He is not toxic-appearing or diaphoretic.   HENT:      Nose: Nose normal.      Mouth/Throat:      Lips: Pink.   Eyes:      General: No scleral icterus.     Conjunctiva/sclera: Conjunctivae normal.   Neck:      Musculoskeletal: Normal range of motion.   Pulmonary:      Effort: Pulmonary effort is normal.   Abdominal:      General: There is no distension.   Musculoskeletal:      Right lower leg: No edema.      Left lower leg: No edema.      Comments: A   Skin:     General: Skin is warm and dry.      Comments: Wound Vac intact/functioning appropriately   Neurological:      Coordination: Coordination is intact.   Psychiatric:         Mood and Affect: Mood normal. Affect is labile.         Behavior: Behavior is uncooperative. Behavior is not aggressive or combative.         Cognition and Memory: Cognition is impaired. He exhibits impaired recent memory.         Judgment: Judgment is  impulsive.      Comments:            Fluids    Intake/Output Summary (Last 24 hours) at 2/3/2021 1519  Last data filed at 2/3/2021 1300  Gross per 24 hour   Intake 1000 ml   Output 500 ml   Net 500 ml       Laboratory                        Imaging  IR-US GUIDED PIV   Final Result    Ultrasound-guided PERIPHERAL IV INSERTION performed by    qualified nursing staff as above.      IR-MIDLINE CATHETER INSERTION WO GUIDANCE > AGE 3   Final Result                  Ultrasound-guided midline placement performed by qualified nursing staff    as above.          IR-US GUIDED PIV   Final Result    Ultrasound-guided PERIPHERAL IV INSERTION performed by    qualified nursing staff as above.      IR-MIDLINE CATHETER INSERTION WO GUIDANCE > AGE 3   Final Result                  Ultrasound-guided midline placement performed by qualified nursing staff    as above.          US-KOSTAS SINGLE LEVEL BILAT   Final Result      CT-HEAD W/O   Final Result      No acute intracranial abnormality      DX-TIBIA AND FIBULA LEFT   Final Result      1.  Healed distal metaphyseal fractures of the left fibula and tibia with tibial IM layla in place.      2.  No acute fracture or dislocation.      3.  No radiopaque soft tissue foreign body.      DX-FEMUR-2+ LEFT   Final Result      1.  No radiographic evidence of acute traumatic injury left femur.      2.  Unchanged cortical thickening in the posterior aspect of the distal femoral diaphysis which may represent sequela of prior injury or infection.      3.  No soft tissue foreign body identified.      US-EXTREMITY VENOUS LOWER UNILAT LEFT   Final Result      DX-CHEST-PORTABLE (1 VIEW)   Final Result      No acute cardiopulmonary abnormality.           Assessment/Plan  * Wound of left leg- (present on admission)  Assessment & Plan  -Wound vac - he intermittently dislodges. Requires ongoing education  -Further management per wound care/LPS/ortho.   -Pain control as needed.   improving    Encephalopathy-  (present on admission)  Assessment & Plan  -Improved   -Per psychiatry and Dr. Velázquez on 1/11: Patient is incapacitated.  -Public Guardian: Coretta Jenkins   -Attempting placement     Psychiatric disorder- (present on admission)  Assessment & Plan  -Unspecified.  -Continue Risperdal and Depakote.  -Psychiatry has consulted and deemed him incapacitated to leave AMA or make medical decisions.  -Initial cognitive evaluation 8/20.   -SLP repeated Cognitive eval 1/2021- continue to recommend 24/7 supervision   -Public Guardian: Coretta Jenkins     Essential hypertension- (present on admission)  Assessment & Plan  -Well controlled on amlodipine.    Flexion contracture of knee, 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)  Assessment & Plan  -Secondary to chronic wound in that area.  -PT/OT.  -Does not seem to limit his mobility.  Is frequently ambulatory.    Emphysema/COPD (HCC)- (present on admission)  Assessment & Plan  -Chronic and stable off medication, without acute exacerbation.    Protein malnutrition (HCC)- (present on admission)  Assessment & Plan  -Body mass index is 21.35 kg/m².   -Continue TID supplements.  -Encourage PO intake.    Infection of wound due to methicillin resistant Staphylococcus aureus (MRSA)- (present on admission)  Assessment & Plan  -History of MRSA.  -Poor compliance with OP wound care. Poor compliance with wound vac at times.   -Continue pain control as needed.  -LPS and wound care following - debridements and wound VAC changes per their recommendations  -Cultures repeated 12/14 - positive for Proteus species, pan sensitive.  Completed regimen of augmentin.   resolved       VTE prophylaxis: Ambulatory     I have performed a physical exam and reviewed and updated ROS and Assessment/Plan today 2/3/2021. In review of the previous note there are no changes except as documented above    I certify the patient requires continued medically necessary hospital services for the treatment of non-healing wound  and cognitive disorder.  The patient will remain in the hospital for the foreseeable future.  Discharge may or may not occur in the next 20 days due to ongoing discharge delays due to having no medically acceptable discharge options.    LOUISE Hull.

## 2021-02-03 NOTE — CARE PLAN
Problem: Bowel/Gastric:  Goal: Normal bowel function is maintained or improved  Outcome: PROGRESSING AS EXPECTED  Note: BM this shift.     Problem: Pain Management  Goal: Pain level will decrease to patient's comfort goal  Outcome: PROGRESSING AS EXPECTED  Note: LLE sore, managed w/ medication, rest, & repositioning.      Pt alert, oriented to self only.  Frequent safety checks.  Up independently in room.  Wound vac to LLE intact.  Good appetite.  Toilet voiding.  Will continue to monitor.    Blood Pressure : 122/78, Pulse: 75, Respiration: 18, Temperature: 36.4 °C (97.5 °F), Pulse Oximetry: 94 %, O2 (LPM): 0, O2 Delivery Device: None - Room Air    Mariya Morel R.N.

## 2021-02-03 NOTE — CARE PLAN
Problem: Infection  Goal: Will remain free from infection  Outcome: PROGRESSING AS EXPECTED  Intervention: Assess signs and symptoms of infection  Note: Pt afebrile throughout shift. No s/s infection.     Problem: Pain Management  Goal: Pain level will decrease to patient's comfort goal  Note: Pain moderately controlled with Oxycodone Prn and flexeril PRN.

## 2021-02-03 NOTE — DISCHARGE PLANNING
Received message from Coretta /cell . I returned call and left another voicemail requesting call back.

## 2021-02-03 NOTE — DISCHARGE PLANNING
Received message from Meena stating that Coretta is guardian . Left voice mail message for Coretta requesting return call.

## 2021-02-04 PROCEDURE — 700102 HCHG RX REV CODE 250 W/ 637 OVERRIDE(OP): Performed by: NURSE PRACTITIONER

## 2021-02-04 PROCEDURE — 700111 HCHG RX REV CODE 636 W/ 250 OVERRIDE (IP): Performed by: NURSE PRACTITIONER

## 2021-02-04 PROCEDURE — A9270 NON-COVERED ITEM OR SERVICE: HCPCS | Performed by: NURSE PRACTITIONER

## 2021-02-04 PROCEDURE — 770001 HCHG ROOM/CARE - MED/SURG/GYN PRIV*

## 2021-02-04 RX ADMIN — GABAPENTIN 300 MG: 300 CAPSULE ORAL at 05:00

## 2021-02-04 RX ADMIN — RISPERIDONE 0.5 MG: 0.5 TABLET ORAL at 05:00

## 2021-02-04 RX ADMIN — DIVALPROEX SODIUM 125 MG: 125 CAPSULE ORAL at 05:00

## 2021-02-04 RX ADMIN — DIVALPROEX SODIUM 125 MG: 125 CAPSULE ORAL at 20:16

## 2021-02-04 RX ADMIN — OXYCODONE HYDROCHLORIDE 10 MG: 10 TABLET ORAL at 20:36

## 2021-02-04 RX ADMIN — AMLODIPINE BESYLATE 5 MG: 5 TABLET ORAL at 05:00

## 2021-02-04 RX ADMIN — DIVALPROEX SODIUM 125 MG: 125 CAPSULE ORAL at 16:33

## 2021-02-04 RX ADMIN — RISPERIDONE 1 MG: 1 TABLET ORAL at 16:32

## 2021-02-04 RX ADMIN — OXYCODONE HYDROCHLORIDE 10 MG: 10 TABLET ORAL at 15:12

## 2021-02-04 RX ADMIN — ENOXAPARIN SODIUM 40 MG: 40 INJECTION SUBCUTANEOUS at 05:00

## 2021-02-04 RX ADMIN — GABAPENTIN 300 MG: 300 CAPSULE ORAL at 16:32

## 2021-02-04 NOTE — DISCHARGE PLANNING
Spoke to guardian, Coretta Miguel A  cell . She requested H&P, Progress notes, and GH order, faxed to .

## 2021-02-05 PROCEDURE — 11045 DBRDMT SUBQ TISS EACH ADDL: CPT | Performed by: NURSE PRACTITIONER

## 2021-02-05 PROCEDURE — A9270 NON-COVERED ITEM OR SERVICE: HCPCS | Performed by: NURSE PRACTITIONER

## 2021-02-05 PROCEDURE — 700102 HCHG RX REV CODE 250 W/ 637 OVERRIDE(OP): Performed by: NURSE PRACTITIONER

## 2021-02-05 PROCEDURE — 770001 HCHG ROOM/CARE - MED/SURG/GYN PRIV*

## 2021-02-05 PROCEDURE — 97608 NEG PRS WND THER NDME>50SQCM: CPT

## 2021-02-05 PROCEDURE — 11042 DBRDMT SUBQ TIS 1ST 20SQCM/<: CPT | Performed by: NURSE PRACTITIONER

## 2021-02-05 PROCEDURE — 700101 HCHG RX REV CODE 250: Performed by: NURSE PRACTITIONER

## 2021-02-05 PROCEDURE — 700111 HCHG RX REV CODE 636 W/ 250 OVERRIDE (IP): Performed by: NURSE PRACTITIONER

## 2021-02-05 RX ADMIN — OXYCODONE HYDROCHLORIDE 10 MG: 10 TABLET ORAL at 04:07

## 2021-02-05 RX ADMIN — DIVALPROEX SODIUM 125 MG: 125 CAPSULE ORAL at 04:07

## 2021-02-05 RX ADMIN — DIVALPROEX SODIUM 125 MG: 125 CAPSULE ORAL at 21:41

## 2021-02-05 RX ADMIN — GABAPENTIN 300 MG: 300 CAPSULE ORAL at 11:35

## 2021-02-05 RX ADMIN — RISPERIDONE 0.5 MG: 0.5 TABLET ORAL at 04:08

## 2021-02-05 RX ADMIN — GABAPENTIN 300 MG: 300 CAPSULE ORAL at 04:08

## 2021-02-05 RX ADMIN — RISPERIDONE 1 MG: 1 TABLET ORAL at 16:27

## 2021-02-05 RX ADMIN — AMLODIPINE BESYLATE 5 MG: 5 TABLET ORAL at 04:08

## 2021-02-05 RX ADMIN — OXYCODONE HYDROCHLORIDE 10 MG: 10 TABLET ORAL at 21:41

## 2021-02-05 RX ADMIN — GABAPENTIN 300 MG: 300 CAPSULE ORAL at 16:27

## 2021-02-05 RX ADMIN — DIVALPROEX SODIUM 125 MG: 125 CAPSULE ORAL at 14:42

## 2021-02-05 RX ADMIN — LIDOCAINE 1 PATCH: 50 PATCH CUTANEOUS at 11:35

## 2021-02-05 RX ADMIN — OXYCODONE HYDROCHLORIDE 10 MG: 10 TABLET ORAL at 11:35

## 2021-02-05 RX ADMIN — ENOXAPARIN SODIUM 40 MG: 40 INJECTION SUBCUTANEOUS at 04:07

## 2021-02-05 ASSESSMENT — PAIN DESCRIPTION - PAIN TYPE
TYPE: ACUTE PAIN
TYPE: ACUTE PAIN

## 2021-02-05 NOTE — WOUND TEAM
[facility]  Inpatient Services  Wound and Skin Care Progress Note    Admission Date: 8/7/2020     Last order of IP CONSULT TO WOUND CARE was found on 9/1/2020 from Hospital Encounter on 8/7/2020     HPI, PMH, SH: Reviewed    Unit where seen by Wound Team: [id number]    WOUND CONSULT/FOLLOW UP RELATED TO:  Left leg scheduled negative pressure wound therapy (npwt) dressing change and 2 layer compression wrap change     OBJECTIVE: NPWT dressing and 2 layer compression wrap in place. Patient on pressure redistribution mattress, up self, ambulates frequently, turns self.    WOUND TYPE, LOCATION, CHARACTERISTICS (Pressure Injuries: location, stage, POA or date identified)     Negative Pressure Wound Therapy 11/20/20 Leg Lateral;Posterior;Medial Left (Active)   NPWT Pump Mode / Pressure Setting Ulta;Continuous;125 mmHg    Dressing Type Medium;Black Foam (Regular)    Number of Foam Pieces Used 3    Canister Changed No    NEXT Dressing Change/Treatment Date 02/08/21            Wound 11/19/20 Full Thickness Wound Leg Lateral;Posterior;Medial Left (Active)   Wound Image         Site Assessment Pink;Red;Yellow;Bleeding    Periwound Assessment Denuded;Red    Margins Attached edges;Defined edges    Closure Secondary intention    Drainage Amount Small    Drainage Description Sanguineous    Treatments Cleansed    Wound Cleansing Approved Wound Cleanser    Periwound Protectant Skin Protectant Wipes to Periwound;Hydrofiber;Hydrocolloid;Drape;Benzoin    Dressing Cleansing/Solutions Not Applicable    Dressing Options Wound Manager;Compression Wrap Two Layer    Dressing Changed Changed    Dressing Status Clean;Dry;Intact    Dressing Change/Treatment Frequency Monday, Wednesday, Friday, and As Needed    NEXT Dressing Change/Treatment Date 02/08/21    NEXT Weekly Photo (Inpatient Only) 02/10/21    Non-staged Wound Description Full thickness 02/03/21 1100   Wound Length (cm) 15 cm 02/03/21 1100   Wound Width (cm) 3.4 cm 02/03/21  1100   Wound Depth (cm) 0.2 cm 02/03/21 1100   Wound Surface Area (cm^2) 51 cm^2 02/03/21 1100   Wound Volume (cm^3) 10.2 cm^3 02/03/21 1100   Post-Procedure Length (cm) 15 cm 02/03/21 1100   Post-Procedure Width (cm) 3.4 cm 02/03/21 1100   Post-Procedure Depth (cm) 0.2 cm 02/03/21 1100   Post-Procedure Surface Area (cm^2) 51 cm^2 02/03/21 1100   Shape irregular    Wound Odor None    Pulses Left;2+;DP;PT    Exposed Structures None      Lab Values:    Lab Results   Component Value Date/Time    WBC 6.5 11/22/2020 02:44 AM    RBC 3.54 (L) 11/22/2020 02:44 AM    HEMOGLOBIN 9.9 (L) 11/22/2020 02:44 AM    HEMATOCRIT 30.5 (L) 11/22/2020 02:44 AM    CREACTPROT 1.07 (H) 08/12/2020 01:55 PM    SEDRATEWES 85 (H) 08/07/2020 01:20 PM    HBA1C 5.7 (H) 11/09/2018 06:30 PM        Culture Results show:  Recent Results (from the past 720 hour(s))   CULTURE WOUND W/ GRAM STAIN    Collection Time: 09/01/20  2:00 PM    Specimen: Left Leg; Wound   Result Value Ref Range    Significant Indicator POS (POS)     Source WND     Site LEFT LEG     Culture Result - (A)     Gram Stain Result       Moderate Gram positive cocci.  Moderate Gram positive rods.      Culture Result (A)      Beta Hemolytic Streptococcus group A  Moderate growth      Culture Result (A)      Methicillin Resistant Staphylococcus aureus  Moderate growth      Culture Result (A)      Diphtheroids  Moderate growth  Mixed morphologies.     KOSTAS: 9/20/21   Left.    Doppler waveforms of the common femoral artery are of high amplitude and    triphasic.    Doppler waveforms at the ankle are brisk and triphasic.    Ankle-brachial index is normal.    Unable to obtained popliteal waveforms due to wound bandages.     Self Report / Pain Level: Pt c/o moderate pain at the most proximal posterior thigh.  Patient pre-medicated with PO oxycodone prior to removal of dressings, stated no pain at conclusion of dressing change.     INTERVENTIONS BY WOUND TEAM: wound care performed per standard  procedures  Cleansed: Warm washcloth with foam soap to meredith-skin, wound cleanser to wound bed, cleansed with normal saline.   Debridement - CSWD with curette to remove slough tissue, performed by Belkis ELLER, please see her note  Periwound - no sting skin prep, plain Aquacel, thin hydrocolloid and drape  Primary - 3 pieces of black foam to wound beds(1 to medial, 1 to posterior thigh, one regular black foam for bridge between the 2, and secured with drape, restarted NPWT 125mmHg, no leaks noted.   Secondary - 2 layer compression wrap foot/leg/thigh    Interdisciplinary consultation: Patient, Bedside RN (Sue)    EVALUATION / RATIONALE FOR TREATMENT:     02/05/21: Meredith wound is denuded and wet, vac drape lifting under wrap, Aquacel to dry wet and denuded skin, no silver foam available this dressing change.  02/03/21: Measurements are smaller for both medial and posterior thigh.  Wound bed has granulation and non-viable slough.  Will continue to debride every time with wound VAC changes to decrease bioburden in wound bed.   02/01/21:  No change.  Wounds appear to be improving slowly.      01/29/2021: wounds flush with periwound, fully granulated with scant yellow to a few areas. Medial periwound intact, lateral periwound with some erythema. JUANITO LEBRONN suggested that next time applying brava strips to any irritated periwound may help reduce irritation.   1/27/21: Restarted NPWT, meredith-wound looks better.   1/25/21: denuded tissue, vac vacation for until 1/27.  Re-assessment to place wound VAC to left LE.   1/21/21 area has decreased since last measurements.  Odor still present.  Meredith skin compromised probably yeast infection under VAC drape.  Will hold VAC for 72 hours and re assess.  Nystatin to address yeast and dry out meredith skin  1/15/21: position of leg may affect posterior thigh measurements.  Medial Leg wound area decreased.    01/10/2021: Wound vac replaced, patient accidentally removed vac and  bedside RN was unable to repair.  Patient tolerated wound VAC placement. Patient had moderate ss with some green tinge drainage.   01/04/2021: 2 layer compression wrap re-applied to left LE due to ACE wrap leaving too much indentation to left LE.   12/31/2020 thigh wound much narrower.  Biofilm redeveloping and odor still present.   12/27/20: Wound bed granular and odor has lessen but still persists.   12/18/20 wound length decreased.  Odor persists.    12/14/2020: re-cultured wound bed.  12/11/2020 POD 23 Length of wounds has decreased, width has not changed.  Odor persists.  Pt continues to become angry with VAC when it limits his mobility.    12/7/2020 POD 19 odor persists, improved wound bed after debridement.  Possible bacterial vs yeast vs fungal infection.  Culture taken.  Nystatin to treat possible fungal infection, silver foam to decrease microbial population.    12/4/2020 POD# 16 odor worsening, more yellow tissue present, will add nystatin and silver foam next week and consider a culture  11/30 POD#11 granular buds visible to wound bed.  Same as prior.   11/27 POD#8 wound is odorous likely related to biologic dressing.  No overt signs of infection.  Granular buds are visible through adaptic.  Edges do not appear as thick as they were prior to last sx.  Continue with current treatment.    11/23: POD#4 s/p I&D of left LE wounds and biologic placed by Dr. Olvera on 11/19.  Wound bed is flatter and raised edges scar tissue seems to be gone.   11/14 Posterior thigh aspect of wound deteriorating, will increase frequency of treatment to keep biofilm down and hopefully avoid systemic abx.  Will include thigh in compression wrap.  Will consider culture if improvement is not seen in the next few treatments.    11/10: APRN unavailable at this time.  Will re-schedule excisional debridement to next week.   11/5: Plan for debridement of scarred edges on Tues by LPS. Tendon exposed to posterior aspect of wound, behind  knee. Continue with current dressing care.  10/29: Excisional debridement by Asaf ELLER of scar tissue along the proximal edge of the posterior knee and lateral thigh.  Lateral aspect of thigh is flatten.  Medial aspect of knee has thick scar tissue that was excisionally debrided by Asaf ELLER.  Fully granulated throughout the wound bed.   10/23 wound bed mostly granular, superficial in depth, progress has been slow with the wound VAC so will trial collagen product to encourage new tissue growth.    10/19: wound bed has improved in color and is fully granulated.  Addison-wound has improved, denudation has resolved. APRN continuing with serial weekly debridements as needed.   10/16: wound friable pt now on iv abx per ID.  Indurated edges addressed with drape and foam.  Addison wound likely has yeast infection - addressing with silver powder crusting  10/14: patient seen with Asaf ELLER, stopping veraflo instillation.  Starting silver powder and regular wound VAC to see if it will help with bioburden.  Possible colonization of bacteria.  ID involved.   10/12: Inflammation noted to the proximal part of the wound bed.  Will continue to monitor, possible vac break on Wednesday to help addison-skin inflammation.   10/7: Excisional debridement performed by Asaf ELLER. Will need to continue selective debridement with NPWT changes, and weekly excisional debridement with APRN to flatten out the scar tissue.  IV abx therapy ended 10/5.   10/5: Lateral wound still with some slough, both wounds smaller per measurements. Medial wound with all granular tissue.  9/30: Patient to start abx therapy for 7 days course per Dr. Ellis.  Started dakin's instillation to veraflo  9/28: malodorous today, culture taken.    9/25: Odor noted, though unclear if from wound or pt, consider shower before next Vac change. Consider regular vac next change, wound granular and superficial.   9/23: Patient still awaiting  guardianship for placement.   9/18: wound granulating nicely and responding well to current treatment.    9/16: Wound bed with granular tissue, edges are thick but appear better than previous assessments. . NPWT VF to encourage closure by secondary intention and manage drainage while encouraging oxygenation and granulation to the wound bed. 2 layer compression to assist in decrease of swelling and encourage blood flow to wounds. Wound team to follow up and change 3x/week.      Goals: Steady decrease in wound area and depth weekly.    NURSING PLAN OF CARE ORDERS (X):    Dressing changes: See Dressing Care orders: X  Skin care: See Skin Care orders:   Rectal tube care: See Rectal Tube Care orders:   Other orders:      WOUND TEAM PLAN OF CARE:   Dressing changes by wound team:        Follow up 3 times weekly:                NPWT change 3 times weekly:  X,  NPWT changes 3x/ weekly with 2 layer compression wrap.  Follow up 1-2 times weekly:      Follow up Bi-Monthly:                   Follow up as needed:       Other (explain):     Anticipated discharge plans:  LTACH:        SNF/Rehab: X - patient will need ongoing wound care for LLE upon discharge - 3x/weekly           Home Health Care:           Outpatient Wound Center:            Self Care:

## 2021-02-06 PROCEDURE — A9270 NON-COVERED ITEM OR SERVICE: HCPCS | Performed by: NURSE PRACTITIONER

## 2021-02-06 PROCEDURE — 700102 HCHG RX REV CODE 250 W/ 637 OVERRIDE(OP): Performed by: NURSE PRACTITIONER

## 2021-02-06 PROCEDURE — 700111 HCHG RX REV CODE 636 W/ 250 OVERRIDE (IP): Performed by: NURSE PRACTITIONER

## 2021-02-06 PROCEDURE — 770001 HCHG ROOM/CARE - MED/SURG/GYN PRIV*

## 2021-02-06 RX ADMIN — ENOXAPARIN SODIUM 40 MG: 40 INJECTION SUBCUTANEOUS at 05:15

## 2021-02-06 RX ADMIN — RISPERIDONE 1 MG: 1 TABLET ORAL at 17:42

## 2021-02-06 RX ADMIN — OXYCODONE HYDROCHLORIDE 10 MG: 10 TABLET ORAL at 11:55

## 2021-02-06 RX ADMIN — OXYCODONE HYDROCHLORIDE 10 MG: 10 TABLET ORAL at 21:00

## 2021-02-06 RX ADMIN — DIVALPROEX SODIUM 125 MG: 125 CAPSULE ORAL at 13:47

## 2021-02-06 RX ADMIN — HYDROXYZINE HYDROCHLORIDE 25 MG: 50 TABLET, FILM COATED ORAL at 21:00

## 2021-02-06 RX ADMIN — GABAPENTIN 300 MG: 300 CAPSULE ORAL at 17:42

## 2021-02-06 RX ADMIN — DIVALPROEX SODIUM 125 MG: 125 CAPSULE ORAL at 21:00

## 2021-02-06 RX ADMIN — AMLODIPINE BESYLATE 5 MG: 5 TABLET ORAL at 05:15

## 2021-02-06 RX ADMIN — GABAPENTIN 300 MG: 300 CAPSULE ORAL at 11:55

## 2021-02-06 RX ADMIN — RISPERIDONE 0.5 MG: 0.5 TABLET ORAL at 05:16

## 2021-02-06 RX ADMIN — DIVALPROEX SODIUM 125 MG: 125 CAPSULE ORAL at 05:16

## 2021-02-06 RX ADMIN — GABAPENTIN 300 MG: 300 CAPSULE ORAL at 05:15

## 2021-02-06 ASSESSMENT — ENCOUNTER SYMPTOMS
MYALGIAS: 1
COUGH: 0
DIZZINESS: 0
VOMITING: 0
CHILLS: 0
WEAKNESS: 0
NAUSEA: 0
SORE THROAT: 0
DIARRHEA: 0
SHORTNESS OF BREATH: 0
PALPITATIONS: 0
HEADACHES: 0
INSOMNIA: 0
FEVER: 0
FALLS: 0
ABDOMINAL PAIN: 0
SENSORY CHANGE: 0
CONSTIPATION: 0

## 2021-02-06 ASSESSMENT — PAIN DESCRIPTION - PAIN TYPE: TYPE: ACUTE PAIN

## 2021-02-06 NOTE — PROGRESS NOTES
[address] [facility]        NAME: Ana Kapadia is a 60 y o  male  : 1960    MRN: [id number]  DATE: 2019  TIME: 8:33 PM    Assessment and Plan   Irritation of both eyes [H57 89]  1  Irritation of both eyes  ciprofloxacin (CILOXAN) 0 3 % ophthalmic solution         Patient Instructions     Use eyedrops or ointment in affected eyes as prescribed  Follow-up with Optometrist tomorrow for further evaluation and treatment  Go to the ED if any fevers, unable to stay hydrated, change in vision, headache, facial swelling or erythema, eye pain, pain with eye movement, abdominal pain, chest pain, shortness of breath, new or worsening symptoms or other concerning symptoms  Patient is aware of elevated blood pressure  He denies any headaches, vision changes, chest pain, shortness of breath, GI/ symptoms other complaints  Patient declines ER transfer at this time  He does agree to follow up with optometrist/ophthalmologist tomorrow and will check his BP with PCP    Chief Complaint     Chief Complaint   Patient presents with    Eye Problem     Pt states has been feeling itching,watery and burning sensation in both eyes since this afternoon  Pt was seen at [facility] urgent care, they did some irrigation    Other     SNELLEN (uncorrected) 20/30 (R) 20/25 (L) 20/20 (Both)         History of Present Illness       62-year-old male presents with bilateral eye irritation since today  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 Varela is a 65-year-old male with PMH alcohol dependence, tobacco dependence, psychiatric disorder, and HTN who presented to the emergency department 8/7/2020 with a left lower extremity wound infection. He was hospitalized at our facility in April and evaluated by orthopedic surgery who recommended wound care. Wound cultures at that time grew MSSA and Streptococcus. He had been seen at [facility] earlier that week and prescribed antibiotics, however he had not filled the prescription.  An ultrasound of that extremity was done and was negative for DVT.  He was treated for sepsis and completed an antibiotic course on 9/16/2020. He was also found to have head lice and underwent treatment for that.  A repeat wound culture was done on 9/28/2020 and grew Strep A and Proteus. Infectious disease was consulted and recommended a 5-day course of IV zosyn which was completed on 10/5/2020. On 10/12/2020 the wound care team noticed increased inflammation to the proximal part of the wound bed and switched from a vera flow wound VAC to a regular wound VAC.  ID was again consulted and on 10/14/2020 recommended to 7-day course of antibiotics with meropenem which was completed on 10/22/2020. Additionally, he was noted to have intermittent agitation so was started on risperdal, depakote, and gabapentin per psychiatric recommendations.  On 11/20/2020 he underwent left lower extremity debridement with wound VAC placement. Since then he has remained stable.  He has been deemed incapacitated to make medical decisions and guardianship was granted on 1/29/21. Placement will likely be in a group home.       Interval Problem Update  Patient ambulating around room, pleasant and cooperative.  His pain is well managed on current regimen.  -Leg wound continues to improve with 3 times/week debridement and wound  VAC.   -Guardian to establish placement, likely group home     Consultants/Specialty  -LPS  -Psychiatry  -Infectious Disease    Code Status  Full Code    Disposition  guardianship granted 1/29/21. SW/CM may attempt to start working on placement.    Review of Systems  Review of Systems   Unable to perform ROS: Medical condition   Constitutional: Negative for chills, fever and malaise/fatigue.   HENT: Negative for congestion and sore throat.    Respiratory: Negative for cough and shortness of breath.    Cardiovascular: Negative for chest pain and palpitations. Leg swelling: improving.   Gastrointestinal: Negative for abdominal pain, constipation, diarrhea, nausea and vomiting.   Genitourinary: Negative for dysuria, frequency and urgency.   Musculoskeletal: Positive for myalgias (Wound site). Negative for falls.   Skin: Negative for itching.   Neurological: Negative for dizziness, sensory change, weakness and headaches.   Psychiatric/Behavioral: Depression: at times. The patient does not have insomnia. Nervous/anxious: at times.    All other systems reviewed and are negative.       Physical Exam  Temp:  [36.6 °C (97.8 °F)] 36.6 °C (97.8 °F)  Pulse:  [96] 96  Resp:  [18] 18  BP: (125)/(88) 125/88  SpO2:  [96 %] 96 %    Physical Exam  Vitals signs and nursing note reviewed. Exam conducted with a chaperone present.   Constitutional:       General: He is sleeping. He is not in acute distress.     Appearance: He is underweight. He is ill-appearing. He is not toxic-appearing or diaphoretic.   HENT:      Nose: Nose normal.      Mouth/Throat:      Lips: Pink.   Eyes:      General: No scleral icterus.     Conjunctiva/sclera: Conjunctivae normal.   Neck:      Musculoskeletal: Normal range of motion.   Pulmonary:      Effort: Pulmonary effort is normal.   Abdominal:      General: There is no distension.   Musculoskeletal:      Right lower leg: No edema.      Left lower leg: No edema.      Comments: A   Skin:     General: Skin is  saline and they did not see any foreign bodies he did not let them get any further in the exam as he left because they did not know how to use the light  States he did try some old antibiotic drops at home with little relief  He denies any fevers, eye pain, pain with eye movement, facial/periorbital swelling erythema or tenderness, vision changes, headache or other complaints  Patient does have some elevated blood pressure but he denies any chest pain, shortness of breath, headaches or other complaints  Denies any prior eye surgeries or injuries  States he is up-to-date on his tetanus vaccine  Patient states he does wear glasses, was wearing glasses today at work      Review of Systems   Review of Systems   Constitutional: Negative for activity change, appetite change, chills, fatigue and fever  HENT: Negative for ear pain, rhinorrhea, sore throat and trouble swallowing  Eyes: Positive for discharge (Bilateral watery eyes) and itching  Negative for photophobia, pain and visual disturbance  Respiratory: Negative for cough, shortness of breath and wheezing  Cardiovascular: Negative for chest pain and leg swelling  Gastrointestinal: Negative for abdominal pain, diarrhea and vomiting  Musculoskeletal: Negative for back pain, joint swelling, neck pain and neck stiffness  Skin: Negative for rash and wound  Neurological: Negative for dizziness, seizures, weakness, numbness and headaches  All other systems reviewed and are negative          Current Medications       Current Outpatient Medications:     aspirin 325 mg tablet, Take by mouth, Disp: , Rfl:     atorvastatin (LIPITOR) 40 mg tablet, Take 1 tablet (40 mg total) by mouth daily, Disp: 90 tablet, Rfl: 3    baclofen 10 mg tablet, Take 1 tablet (10 mg total) by mouth 3 (three) times a day, Disp: 45 tablet, Rfl: 0    lisinopril (ZESTRIL) 10 mg tablet, Take 1 tablet (10 mg total) by mouth daily, Disp: 90 tablet, Rfl: 3    meloxicam (MOBIC) 15 mg tablet, Take 1 tablet (15 mg total) by mouth daily, Disp: 14 tablet, Rfl: 0    metoprolol tartrate (LOPRESSOR) 25 mg tablet, Take 1 tablet (25 mg total) by mouth 2 (two) times a day, Disp: 180 tablet, Rfl: 3    pantoprazole (PROTONIX) 40 mg tablet, Take 1 tablet (40 mg total) by mouth daily, Disp: 90 tablet, Rfl: 3    ciprofloxacin (CILOXAN) 0 3 % ophthalmic solution, Administer 1 drop to both eyes every 4 (four) hours for 7 days, Disp: 2 5 mL, Rfl: 0    Current Allergies     Allergies as of 09/07/2019    (No Known Allergies)            The following portions of the patient's history were reviewed and updated as appropriate: allergies, current medications, past family history, past medical history, past social history, past surgical history and problem list      Past Medical History:   Diagnosis Date    GERD (gastroesophageal reflux disease)     Hyperlipidemia     Hypertension        Past Surgical History:   Procedure Laterality Date    HERNIA REPAIR         Family History   Problem Relation Age of Onset    No Known Problems Mother     No Known Problems Family          Medications have been verified  Objective   /100 (BP Location: Left arm, Patient Position: Sitting, Cuff Size: Standard)   Pulse 63   Temp 98 8 °F (37 1 °C) (Temporal)   Resp 16   Ht 5' 8" (1 727 m)   Wt 74 8 kg (165 lb)   BMI 25 09 kg/m²        Physical Exam     Physical Exam   Constitutional: He is oriented to person, place, and time  He appears well-developed and well-nourished  No distress  HENT:   Head: Normocephalic and atraumatic  Eyes: Pupils are equal, round, and reactive to light  EOM are normal    No nystagmus, no pain with EOM  No foreign bodies visualized  Mild, bilateral lateral scleral irritation  Bilateral conjunctiva irritation/injection  no drainage  No sign of globe rupture  No facial/periorbital swelling, erythema or tenderness to palpation   Neck: Normal range of motion   Neck warm and dry.      Comments: Wound Vac intact/functioning appropriately   Neurological:      Coordination: Coordination is intact.   Psychiatric:         Attention and Perception: Attention normal.         Mood and Affect: Mood normal.         Speech: Speech normal.         Behavior: Behavior is not aggressive or combative. Behavior is cooperative.         Cognition and Memory: Cognition is impaired. He exhibits impaired recent memory.      Comments:            Fluids  No intake or output data in the 24 hours ending 02/06/21 1344    Laboratory                        Imaging  IR-US GUIDED PIV   Final Result    Ultrasound-guided PERIPHERAL IV INSERTION performed by    qualified nursing staff as above.      IR-MIDLINE CATHETER INSERTION WO GUIDANCE > AGE 3   Final Result                  Ultrasound-guided midline placement performed by qualified nursing staff    as above.          IR-US GUIDED PIV   Final Result    Ultrasound-guided PERIPHERAL IV INSERTION performed by    qualified nursing staff as above.      IR-MIDLINE CATHETER INSERTION WO GUIDANCE > AGE 3   Final Result                  Ultrasound-guided midline placement performed by qualified nursing staff    as above.          US-OKSTAS SINGLE LEVEL BILAT   Final Result      CT-HEAD W/O   Final Result      No acute intracranial abnormality      DX-TIBIA AND FIBULA LEFT   Final Result      1.  Healed distal metaphyseal fractures of the left fibula and tibia with tibial IM layla in place.      2.  No acute fracture or dislocation.      3.  No radiopaque soft tissue foreign body.      DX-FEMUR-2+ LEFT   Final Result      1.  No radiographic evidence of acute traumatic injury left femur.      2.  Unchanged cortical thickening in the posterior aspect of the distal femoral diaphysis which may represent sequela of prior injury or infection.      3.  No soft tissue foreign body identified.      US-EXTREMITY VENOUS LOWER UNILAT LEFT   Final Result      DX-CHEST-PORTABLE (1  VIEW)   Final Result      No acute cardiopulmonary abnormality.           Assessment/Plan  * Wound of left leg- (present on admission)  Assessment & Plan  -Wound vac - he intermittently dislodges. Requires ongoing education  -Further management per wound care/LPS/ortho.   -Pain control as needed.   improving    Encephalopathy- (present on admission)  Assessment & Plan  -Improved   -Per psychiatry and Dr. Velázquez on 1/11: Patient is incapacitated.  -Public Guardian: Coretta Jenkins   -Attempting placement     Psychiatric disorder- (present on admission)  Assessment & Plan  -Unspecified.  -Continue Risperdal and Depakote.  -Psychiatry has consulted and deemed him incapacitated to leave AMA or make medical decisions.  -Initial cognitive evaluation 8/20.   -SLP repeated Cognitive eval 1/2021- continue to recommend 24/7 supervision   -Public Guardian: Coretta Jenkins     Essential hypertension- (present on admission)  Assessment & Plan  -Well controlled on amlodipine.    Emphysema/COPD (HCC)- (present on admission)  Assessment & Plan  -Chronic and stable off medication, without acute exacerbation.    Protein malnutrition (HCC)- (present on admission)  Assessment & Plan  -Body mass index is 21.35 kg/m².   -Continue TID supplements.  -Encourage PO intake.    Flexion contracture of knee, 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)  Assessment & Plan  -Secondary to chronic wound in that area.  -PT/OT.  -Does not seem to limit his mobility.  Is frequently ambulatory.    Infection of wound due to methicillin resistant Staphylococcus aureus (MRSA)- (present on admission)  Assessment & Plan  -History of MRSA.  -Poor compliance with OP wound care. Poor compliance with wound vac at times.   -Continue pain control as needed.  -LPS and wound care following - debridements and wound VAC changes per their recommendations  -Cultures repeated 12/14 - positive for Proteus species, pan sensitive.  Completed regimen of augmentin.   resolved       VTE  supple  Cardiovascular: Normal rate, regular rhythm and normal heart sounds  Pulmonary/Chest: Effort normal and breath sounds normal  He has no wheezes  Neurological: He is alert and oriented to person, place, and time  Psychiatric: He has a normal mood and affect  His behavior is normal    Nursing note and vitals reviewed  Foreign body removal  Date/Time: 9/7/2019 8:37 PM  Performed by: Xochilt Galarza PA-C  Authorized by: Xochilt Galarza PA-C   [facility] Protocol:Consent: Verbal consent obtained  Risks and benefits: risks, benefits and alternatives were discussed  Consent given by: patient  Patient understanding: patient states understanding of the procedure being performed  Patient consent: the patient's understanding of the procedure matches consent given  Patient identity confirmed: verbally with patient    Body area: eye  Location: bilateral eyes  Anesthesia:  Local Anesthetic: tetracaine drops  Anesthetic total (drops): 1Localization method: eyelid eversion and visualized  Removal mechanism: eyelid eversion and irrigation  Eye examined with fluorescein  Fluorescein uptake: mild, bilateral lateral scleral irritation/uptake  No residual rust ring present  0 objects recovered  Objects recovered: No foreign bodies were visualized  Patient tolerance: Patient tolerated the procedure well with no immediate complications  Comments: Patient felt better after exam   He had no further complaints  Visual acuity intact postprocedure  No foreign bodies were visualized  Patient verbalized good understanding to follow up with optometry/ophthalmology within 24 hours or go to the ER if any new or worsening symptoms  prophylaxis: Ambulatory     I have performed a physical exam and reviewed and updated ROS and Assessment/Plan today 2/6/2021. In review of the previous note there are no changes except as documented above    I certify the patient requires continued medically necessary hospital services for the treatment of non-healing wound and cognitive disorder.  The patient will remain in the hospital for the foreseeable future.  Discharge may or may not occur in the next 20 days due to ongoing discharge delays due to having no medically acceptable discharge options.    LOUISE Hull.

## 2021-02-07 PROCEDURE — 700102 HCHG RX REV CODE 250 W/ 637 OVERRIDE(OP): Performed by: NURSE PRACTITIONER

## 2021-02-07 PROCEDURE — 700111 HCHG RX REV CODE 636 W/ 250 OVERRIDE (IP): Performed by: NURSE PRACTITIONER

## 2021-02-07 PROCEDURE — A9270 NON-COVERED ITEM OR SERVICE: HCPCS | Performed by: NURSE PRACTITIONER

## 2021-02-07 PROCEDURE — 770001 HCHG ROOM/CARE - MED/SURG/GYN PRIV*

## 2021-02-07 PROCEDURE — 700101 HCHG RX REV CODE 250: Performed by: NURSE PRACTITIONER

## 2021-02-07 RX ADMIN — GABAPENTIN 300 MG: 300 CAPSULE ORAL at 13:18

## 2021-02-07 RX ADMIN — CYCLOBENZAPRINE 10 MG: 10 TABLET, FILM COATED ORAL at 22:13

## 2021-02-07 RX ADMIN — GABAPENTIN 300 MG: 300 CAPSULE ORAL at 05:52

## 2021-02-07 RX ADMIN — RISPERIDONE 0.5 MG: 0.5 TABLET ORAL at 05:52

## 2021-02-07 RX ADMIN — RISPERIDONE 1 MG: 1 TABLET ORAL at 17:36

## 2021-02-07 RX ADMIN — DIVALPROEX SODIUM 125 MG: 125 CAPSULE ORAL at 22:13

## 2021-02-07 RX ADMIN — LIDOCAINE 1 PATCH: 50 PATCH CUTANEOUS at 13:18

## 2021-02-07 RX ADMIN — GABAPENTIN 300 MG: 300 CAPSULE ORAL at 17:36

## 2021-02-07 RX ADMIN — ENOXAPARIN SODIUM 40 MG: 40 INJECTION SUBCUTANEOUS at 05:52

## 2021-02-07 RX ADMIN — HYDROXYZINE HYDROCHLORIDE 25 MG: 50 TABLET, FILM COATED ORAL at 22:13

## 2021-02-07 RX ADMIN — DIVALPROEX SODIUM 125 MG: 125 CAPSULE ORAL at 05:52

## 2021-02-07 RX ADMIN — OXYCODONE HYDROCHLORIDE 10 MG: 10 TABLET ORAL at 22:13

## 2021-02-07 RX ADMIN — AMLODIPINE BESYLATE 5 MG: 5 TABLET ORAL at 05:52

## 2021-02-07 RX ADMIN — DIVALPROEX SODIUM 125 MG: 125 CAPSULE ORAL at 13:18

## 2021-02-07 RX ADMIN — OXYCODONE HYDROCHLORIDE 10 MG: 10 TABLET ORAL at 17:36

## 2021-02-07 NOTE — CARE PLAN
Problem: Safety  Goal: Will remain free from falls  Description: Pt mobilizes frequently independently.   Outcome: PROGRESSING AS EXPECTED     Problem: Venous Thromboembolism (VTW)/Deep Vein Thrombosis (DVT) Prevention:  Goal: Patient will participate in Venous Thrombosis (VTE)/Deep Vein Thrombosis (DVT)Prevention Measures  Outcome: PROGRESSING AS EXPECTED     Problem: Knowledge Deficit  Goal: Knowledge of disease process/condition, treatment plan, diagnostic tests, and medications will improve  Outcome: PROGRESSING AS EXPECTED

## 2021-02-08 PROCEDURE — A9270 NON-COVERED ITEM OR SERVICE: HCPCS | Performed by: NURSE PRACTITIONER

## 2021-02-08 PROCEDURE — 700102 HCHG RX REV CODE 250 W/ 637 OVERRIDE(OP): Performed by: NURSE PRACTITIONER

## 2021-02-08 PROCEDURE — 770001 HCHG ROOM/CARE - MED/SURG/GYN PRIV*

## 2021-02-08 PROCEDURE — 97606 NEG PRS WND THER DME>50 SQCM: CPT

## 2021-02-08 PROCEDURE — 700101 HCHG RX REV CODE 250: Performed by: NURSE PRACTITIONER

## 2021-02-08 PROCEDURE — 700111 HCHG RX REV CODE 636 W/ 250 OVERRIDE (IP): Performed by: NURSE PRACTITIONER

## 2021-02-08 RX ADMIN — OXYCODONE HYDROCHLORIDE 10 MG: 10 TABLET ORAL at 08:59

## 2021-02-08 RX ADMIN — DIVALPROEX SODIUM 125 MG: 125 CAPSULE ORAL at 05:14

## 2021-02-08 RX ADMIN — DIVALPROEX SODIUM 125 MG: 125 CAPSULE ORAL at 15:22

## 2021-02-08 RX ADMIN — CYCLOBENZAPRINE 10 MG: 10 TABLET, FILM COATED ORAL at 08:59

## 2021-02-08 RX ADMIN — DIVALPROEX SODIUM 125 MG: 125 CAPSULE ORAL at 21:52

## 2021-02-08 RX ADMIN — RISPERIDONE 1 MG: 1 TABLET ORAL at 18:00

## 2021-02-08 RX ADMIN — OXYCODONE HYDROCHLORIDE 10 MG: 10 TABLET ORAL at 15:22

## 2021-02-08 RX ADMIN — ENOXAPARIN SODIUM 40 MG: 40 INJECTION SUBCUTANEOUS at 05:14

## 2021-02-08 RX ADMIN — RISPERIDONE 0.5 MG: 0.5 TABLET ORAL at 05:14

## 2021-02-08 RX ADMIN — LIDOCAINE 1 PATCH: 50 PATCH CUTANEOUS at 11:35

## 2021-02-08 RX ADMIN — GABAPENTIN 300 MG: 300 CAPSULE ORAL at 11:35

## 2021-02-08 RX ADMIN — GABAPENTIN 300 MG: 300 CAPSULE ORAL at 05:14

## 2021-02-08 RX ADMIN — OXYCODONE HYDROCHLORIDE 10 MG: 10 TABLET ORAL at 21:52

## 2021-02-08 RX ADMIN — CYCLOBENZAPRINE 10 MG: 10 TABLET, FILM COATED ORAL at 23:51

## 2021-02-08 RX ADMIN — AMLODIPINE BESYLATE 5 MG: 5 TABLET ORAL at 05:14

## 2021-02-08 RX ADMIN — GABAPENTIN 300 MG: 300 CAPSULE ORAL at 18:00

## 2021-02-08 ASSESSMENT — PAIN DESCRIPTION - PAIN TYPE
TYPE: ACUTE PAIN
TYPE: ACUTE PAIN

## 2021-02-08 NOTE — CARE PLAN
Problem: Safety  Goal: Will remain free from injury  Outcome: PROGRESSING AS EXPECTED  No fall or injury.     Problem: Knowledge Deficit  Goal: Knowledge of disease process/condition, treatment plan, diagnostic tests, and medications will improve  Outcome: PROGRESSING SLOWER THAN EXPECTED  Patient intermittently confused and forgetful of disease process and diagnosis.  Frequent reorienting implemented.

## 2021-02-08 NOTE — DISCHARGE PLANNING
Anticipated Discharge Disposition: TBD-[facility] vs Long term care facility    Action: Discussed in IDT rounds; pt pending guardianship access to financials for placement.    Barriers to Discharge: Placement  Guardianship-financial access    Plan: Care coordination will follow up with guardian for updates.     Kristel-JOAQUÍN spoke with Coretta (Guardian). Coretta requests a copy of pt's medication list be faxed to her ([phone]) and she would like to schedule a Zoom meeting with pt.  Medication list faxed; Coretta forwarded to unit charge RN for coordination of Zoom call.

## 2021-02-08 NOTE — WOUND TEAM
[facility]  Inpatient Services  Wound and Skin Care Progress Note    Admission Date: 8/7/2020     Last order of IP CONSULT TO WOUND CARE was found on 9/1/2020 from Hospital Encounter on 8/7/2020     HPI, PMH, SH: Reviewed    Unit where seen by Wound Team: [id number]    WOUND CONSULT/FOLLOW UP RELATED TO:  Left leg scheduled negative pressure wound therapy (npwt) dressing change and 2 layer compression wrap change     OBJECTIVE: NPWT dressing and 2 layer compression wrap in place. Patient on pressure redistribution mattress, up self, ambulates frequently, turns self.    WOUND TYPE, LOCATION, CHARACTERISTICS (Pressure Injuries: location, stage, POA or date identified)    Negative Pressure Wound Therapy 11/20/20 Leg Lateral;Posterior;Medial Left (Active)   NPWT Pump Mode / Pressure Setting [place];Continuous;125 mmHg 02/08/21 1200   Dressing Type Medium;Black Foam (Regular) 02/08/21 1200   Number of Foam Pieces Used 3 02/08/21 1200   Canister Changed No 02/05/21 1353   Output (mL) 500 mL 02/03/21 1100   NEXT Dressing Change/Treatment Date 02/10/21 02/08/21 1200   WOUND NURSE ONLY - Time Spent with Patient (mins) 150 02/05/21 1353           Wound 11/19/20 Full Thickness Wound Leg Lateral;Posterior;Medial Left (Active)   Wound Image      02/05/21 1353   Site Assessment Red;[place] 02/08/21 1200   Periwound Assessment Clean;Dry;Intact;Scar tissue 02/08/21 1200   Margins Defined edges;Attached edges 02/08/21 1200   Closure Secondary intention 02/08/21 1200   Drainage Amount Moderate 02/08/21 1200   Drainage Description Serosanguineous 02/08/21 1200   Treatments Cleansed;CSWD - Conservative Sharp Wound Debridement;Irrigation;Offloading 02/08/21 1200   Wound Cleansing Approved Wound Cleanser 02/08/21 1200   Periwound Protectant Benzoin;Hydrocolloid;Drape;Hydrofiber 02/08/21 1200   Dressing Cleansing/Solutions Not Applicable 02/08/21 1200   Dressing Options Wound Vac;Compression Wrap Two Layer 02/08/21 1200   Dressing  Changed Changed 02/08/21 1200   Dressing Status Clean;Dry;Intact 02/08/21 1200   Dressing Change/Treatment Frequency By Wound Team Only 02/08/21 1200   NEXT Dressing Change/Treatment Date 02/10/21 02/08/21 1200   NEXT Weekly Photo (Inpatient Only) 02/10/21 02/08/21 1200   Non-staged Wound Description Full thickness 02/03/21 1100   Wound Length (cm) 15 cm 02/03/21 1100   Wound Width (cm) 3.4 cm 02/03/21 1100   Wound Depth (cm) 0.2 cm 02/03/21 1100   Wound Surface Area (cm^2) 51 cm^2 02/03/21 1100   Wound Volume (cm^3) 10.2 cm^3 02/03/21 1100   Post-Procedure Length (cm) 15 cm 02/03/21 1100   Post-Procedure Width (cm) 3.4 cm 02/03/21 1100   Post-Procedure Depth (cm) 0.2 cm 02/03/21 1100   Post-Procedure Surface Area (cm^2) 51 cm^2 02/03/21 1100   Post-Procedure Volume (cm^3) 10.2 cm^3 02/03/21 1100   Wound Bed Granulation (%) 100 % 02/03/21 1100   Wound Bed Slough (%) 10 % 01/22/21 0912   Wound Bed Granulation (%) - Post-Procedure 100 % 02/03/21 1100   Shape irregular 02/08/21 1200   Wound Odor None 02/08/21 1200   Pulses Left;2+;DP;PT 02/03/21 1100   Exposed Structures None 02/08/21 1200   WOUND NURSE ONLY - Time Spent with Patient (mins) 75 02/08/21 1200               Lab Values:    Lab Results   Component Value Date/Time    WBC 6.5 11/22/2020 02:44 AM    RBC 3.54 (L) 11/22/2020 02:44 AM    HEMOGLOBIN 9.9 (L) 11/22/2020 02:44 AM    HEMATOCRIT 30.5 (L) 11/22/2020 02:44 AM    CREACTPROT 1.07 (H) 08/12/2020 01:55 PM    SEDRATEWES 85 (H) 08/07/2020 01:20 PM    HBA1C 5.7 (H) 11/09/2018 06:30 PM        Culture Results show:  Recent Results (from the past 720 hour(s))   CULTURE WOUND W/ GRAM STAIN    Collection Time: 09/01/20  2:00 PM    Specimen: Left Leg; Wound   Result Value Ref Range    Significant Indicator POS (POS)     Source WND     Site LEFT LEG     Culture Result - (A)     Gram Stain Result       Moderate Gram positive cocci.  Moderate Gram positive rods.      Culture Result (A)      Beta Hemolytic Streptococcus  group A  Moderate growth      Culture Result (A)      Methicillin Resistant Staphylococcus aureus  Moderate growth      Culture Result (A)      Diphtheroids  Moderate growth  Mixed morphologies.     KOSTAS: 9/20/21   Left.    Doppler waveforms of the common femoral artery are of high amplitude and    triphasic.    Doppler waveforms at the ankle are brisk and triphasic.    Ankle-brachial index is normal.    Unable to obtained popliteal waveforms due to wound bandages.     Self Report / Pain Level: Pt c/o moderate pain at the most proximal posterior thigh.  Patient pre-medicated with PO oxycodone prior to removal of dressings, stated no pain at conclusion of dressing change.     INTERVENTIONS BY WOUND TEAM: wound care performed per standard procedures  Cleansed: Warm washcloth with foam soap to addison-skin, wound cleanser to wound bed, cleansed with normal saline.   Debridement - CSWD with curette to remove slough tissue  Periwound - no sting skin prep, plain Aquacel, thin hydrocolloid and drape  Primary - Argales powder to wound bed, 3 pieces of black foam to wound beds, and secured with drape, restarted NPWT 125mmHg, no leaks noted.   Secondary - 2 layer compression wrap foot/leg/thigh    Interdisciplinary consultation: Patient, Bedside RN (Williams)    EVALUATION / RATIONALE FOR TREATMENT:     02/05/21: Addison wound is denuded and wet, vac drape lifting under wrap, Aquacel to dry wet and denuded skin, no silver foam available this dressing change.  02/03/21: Measurements are smaller for both medial and posterior thigh.  Wound bed has granulation and non-viable slough.  Will continue to debride every time with wound VAC changes to decrease bioburden in wound bed.   02/01/21:  No change.  Wounds appear to be improving slowly.      01/29/2021: wounds flush with periwound, fully granulated with scant yellow to a few areas. Medial periwound intact, lateral periwound with some erythema. LPS APRN suggested that next time applying  brava strips to any irritated periwound may help reduce irritation.   1/27/21: Restarted NPWT, addison-wound looks better.   1/25/21: denuded tissue, vac vacation for until 1/27.  Re-assessment to place wound VAC to left LE.   1/21/21 area has decreased since last measurements.  Odor still present.  Addison skin compromised probably yeast infection under VAC drape.  Will hold VAC for 72 hours and re assess.  Nystatin to address yeast and dry out addison skin  1/15/21: position of leg may affect posterior thigh measurements.  Medial Leg wound area decreased.    01/10/2021: Wound vac replaced, patient accidentally removed vac and bedside RN was unable to repair.  Patient tolerated wound VAC placement. Patient had moderate ss with some green tinge drainage.   01/04/2021: 2 layer compression wrap re-applied to left LE due to ACE wrap leaving too much indentation to left LE.   12/31/2020 thigh wound much narrower.  Biofilm redeveloping and odor still present.   12/27/20: Wound bed granular and odor has lessen but still persists.   12/18/20 wound length decreased.  Odor persists.    12/14/2020: re-cultured wound bed.  12/11/2020 POD 23 Length of wounds has decreased, width has not changed.  Odor persists.  Pt continues to become angry with VAC when it limits his mobility.    12/7/2020 POD 19 odor persists, improved wound bed after debridement.  Possible bacterial vs yeast vs fungal infection.  Culture taken.  Nystatin to treat possible fungal infection, silver foam to decrease microbial population.    12/4/2020 POD# 16 odor worsening, more yellow tissue present, will add nystatin and silver foam next week and consider a culture  11/30 POD#11 granular buds visible to wound bed.  Same as prior.   11/27 POD#8 wound is odorous likely related to biologic dressing.  No overt signs of infection.  Granular buds are visible through adaptic.  Edges do not appear as thick as they were prior to last sx.  Continue with current treatment.     11/23: POD#4 s/p I&D of left LE wounds and biologic placed by Dr. Olvera on 11/19.  Wound bed is flatter and raised edges scar tissue seems to be gone.   11/14 Posterior thigh aspect of wound deteriorating, will increase frequency of treatment to keep biofilm down and hopefully avoid systemic abx.  Will include thigh in compression wrap.  Will consider culture if improvement is not seen in the next few treatments.    11/10: APRN unavailable at this time.  Will re-schedule excisional debridement to next week.   11/5: Plan for debridement of scarred edges on Tues by LPS. Tendon exposed to posterior aspect of wound, behind knee. Continue with current dressing care.  10/29: Excisional debridement by Asaf ELLER of scar tissue along the proximal edge of the posterior knee and lateral thigh.  Lateral aspect of thigh is flatten.  Medial aspect of knee has thick scar tissue that was excisionally debrided by Asaf ELLER.  Fully granulated throughout the wound bed.   10/23 wound bed mostly granular, superficial in depth, progress has been slow with the wound VAC so will trial collagen product to encourage new tissue growth.    10/19: wound bed has improved in color and is fully granulated.  Addison-wound has improved, denudation has resolved. APRN continuing with serial weekly debridements as needed.   10/16: wound friable pt now on iv abx per ID.  Indurated edges addressed with drape and foam.  Addison wound likely has yeast infection - addressing with silver powder crusting  10/14: patient seen with Asaf ELLER, stopping veraflo instillation.  Starting silver powder and regular wound VAC to see if it will help with bioburden.  Possible colonization of bacteria.  ID involved.   10/12: Inflammation noted to the proximal part of the wound bed.  Will continue to monitor, possible vac break on Wednesday to help addison-skin inflammation.   10/7: Excisional debridement performed by Asaf ELLER. Will need to  continue selective debridement with NPWT changes, and weekly excisional debridement with APRN to flatten out the scar tissue.  IV abx therapy ended 10/5.   10/5: Lateral wound still with some slough, both wounds smaller per measurements. Medial wound with all granular tissue.  9/30: Patient to start abx therapy for 7 days course per Dr. Ellis.  Started dakin's instillation to veraflo  9/28: malodorous today, culture taken.    9/25: Odor noted, though unclear if from wound or pt, consider shower before next Vac change. Consider regular vac next change, wound granular and superficial.   9/23: Patient still awaiting guardianship for placement.   9/18: wound granulating nicely and responding well to current treatment.    9/16: Wound bed with granular tissue, edges are thick but appear better than previous assessments. . NPWT VF to encourage closure by secondary intention and manage drainage while encouraging oxygenation and granulation to the wound bed. 2 layer compression to assist in decrease of swelling and encourage blood flow to wounds. Wound team to follow up and change 3x/week.      Goals: Steady decrease in wound area and depth weekly.    NURSING PLAN OF CARE ORDERS (X):    Dressing changes: See Dressing Care orders: X  Skin care: See Skin Care orders:   Rectal tube care: See Rectal Tube Care orders:   Other orders:      WOUND TEAM PLAN OF CARE:   Dressing changes by wound team:        Follow up 3 times weekly:                NPWT change 3 times weekly:  X,  NPWT changes 3x/ weekly with 2 layer compression wrap.  Follow up 1-2 times weekly:      Follow up Bi-Monthly:                   Follow up as needed:       Other (explain):     Anticipated discharge plans:  LTACH:        SNF/Rehab: X - patient will need ongoing wound care for LLE upon discharge - 3x/weekly           Home Health Care:           Outpatient Wound Center:            Self Care:

## 2021-02-09 PROCEDURE — 770001 HCHG ROOM/CARE - MED/SURG/GYN PRIV*

## 2021-02-09 PROCEDURE — A9270 NON-COVERED ITEM OR SERVICE: HCPCS | Performed by: NURSE PRACTITIONER

## 2021-02-09 PROCEDURE — 700102 HCHG RX REV CODE 250 W/ 637 OVERRIDE(OP): Performed by: NURSE PRACTITIONER

## 2021-02-09 PROCEDURE — 700101 HCHG RX REV CODE 250: Performed by: NURSE PRACTITIONER

## 2021-02-09 PROCEDURE — 99231 SBSQ HOSP IP/OBS SF/LOW 25: CPT | Performed by: NURSE PRACTITIONER

## 2021-02-09 RX ADMIN — HYDROXYZINE HYDROCHLORIDE 25 MG: 50 TABLET, FILM COATED ORAL at 17:55

## 2021-02-09 RX ADMIN — DIVALPROEX SODIUM 125 MG: 125 CAPSULE ORAL at 04:49

## 2021-02-09 RX ADMIN — OXYCODONE HYDROCHLORIDE 10 MG: 10 TABLET ORAL at 21:29

## 2021-02-09 RX ADMIN — DIVALPROEX SODIUM 125 MG: 125 CAPSULE ORAL at 13:23

## 2021-02-09 RX ADMIN — OXYCODONE HYDROCHLORIDE 10 MG: 10 TABLET ORAL at 13:23

## 2021-02-09 RX ADMIN — GABAPENTIN 300 MG: 300 CAPSULE ORAL at 13:23

## 2021-02-09 RX ADMIN — GABAPENTIN 300 MG: 300 CAPSULE ORAL at 04:49

## 2021-02-09 RX ADMIN — RISPERIDONE 1 MG: 1 TABLET ORAL at 17:53

## 2021-02-09 RX ADMIN — CYCLOBENZAPRINE 10 MG: 10 TABLET, FILM COATED ORAL at 13:23

## 2021-02-09 RX ADMIN — AMLODIPINE BESYLATE 5 MG: 5 TABLET ORAL at 04:49

## 2021-02-09 RX ADMIN — OXYCODONE HYDROCHLORIDE 10 MG: 10 TABLET ORAL at 07:36

## 2021-02-09 RX ADMIN — DIVALPROEX SODIUM 125 MG: 125 CAPSULE ORAL at 21:30

## 2021-02-09 RX ADMIN — RISPERIDONE 0.5 MG: 0.5 TABLET ORAL at 04:49

## 2021-02-09 RX ADMIN — LIDOCAINE 1 PATCH: 50 PATCH CUTANEOUS at 13:23

## 2021-02-09 RX ADMIN — GABAPENTIN 300 MG: 300 CAPSULE ORAL at 17:53

## 2021-02-09 RX ADMIN — CYCLOBENZAPRINE 10 MG: 10 TABLET, FILM COATED ORAL at 21:30

## 2021-02-09 RX ADMIN — HYDROXYZINE HYDROCHLORIDE 25 MG: 50 TABLET, FILM COATED ORAL at 07:36

## 2021-02-09 ASSESSMENT — ENCOUNTER SYMPTOMS
NAUSEA: 0
SENSORY CHANGE: 0
DIZZINESS: 0
VOMITING: 0
ABDOMINAL PAIN: 0
SHORTNESS OF BREATH: 0
COUGH: 0
INSOMNIA: 0
DIARRHEA: 0
FEVER: 0
MYALGIAS: 0
CHILLS: 0
CONSTIPATION: 0
PALPITATIONS: 0
SORE THROAT: 0
FALLS: 0
HEADACHES: 0
WEAKNESS: 0

## 2021-02-09 ASSESSMENT — PAIN DESCRIPTION - PAIN TYPE
TYPE: ACUTE PAIN
TYPE: ACUTE PAIN

## 2021-02-09 NOTE — CARE PLAN
Problem: Safety  Goal: Will remain free from injury  Outcome: PROGRESSING AS EXPECTED  Patient calls appropriately.     Problem: Knowledge Deficit  Goal: Knowledge of disease process/condition, treatment plan, diagnostic tests, and medications will improve  Outcome: PROGRESSING AS EXPECTED  POC explained to patient.  Patient aware of group home vs long term care facility plan.

## 2021-02-09 NOTE — PROGRESS NOTES
[facility] Medicine Twice Weekly Progress Note    Date of Service  2/9/2021    Chief Complaint  Wound Infection    Hospital Course  Mr. Varela is a 65-year-old male with PMH alcohol dependence, tobacco dependence, psychiatric disorder, and HTN who presented to the emergency department 8/7/2020 with a left lower extremity wound infection. He was hospitalized at our facility in April and evaluated by orthopedic surgery who recommended wound care. Wound cultures at that time grew MSSA and Streptococcus. He had been seen at [facility] earlier that week and prescribed antibiotics, however he had not filled the prescription.  An ultrasound of that extremity was done and was negative for DVT.  He was treated for sepsis and completed an antibiotic course on 9/16/2020. He was also found to have head lice and underwent treatment for that.  A repeat wound culture was done on 9/28/2020 and grew Strep A and Proteus. Infectious disease was consulted and recommended a 5-day course of IV zosyn which was completed on 10/5/2020. On 10/12/2020 the wound care team noticed increased inflammation to the proximal part of the wound bed and switched from a vera flow wound VAC to a regular wound VAC.  ID was again consulted and on 10/14/2020 recommended to 7-day course of antibiotics with meropenem which was completed on 10/22/2020. Additionally, he was noted to have intermittent agitation so was started on risperdal, depakote, and gabapentin per psychiatric recommendations.  On 11/20/2020 he underwent left lower extremity debridement with wound VAC placement. Since then he has remained stable.  He has been deemed incapacitated to make medical decisions and guardianship was granted on 1/29/21. Placement will likely be in a group home.     Interval Problem Update  Patient ambulating around room, pleasant and cooperative.  His pain is well managed on current regimen.  -Leg wound continues to improve with 3 times/week debridement and  wound VAC.   -Guardian to establish placement, likely group home  -VSS and WNL; no recent labs or imaging    Consultants/Specialty  -LPS  -Psychiatry  -Infectious Disease    Code Status  Full Code    Disposition  guardianship granted 1/29/21. SW/CM may attempt to start working on placement.  Obtaining financials.    Review of Systems  Review of Systems   Unable to perform ROS: Medical condition   Constitutional: Negative for chills, fever and malaise/fatigue.   HENT: Negative for congestion and sore throat.    Respiratory: Negative for cough and shortness of breath.    Cardiovascular: Negative for chest pain and palpitations. Leg swelling: improving.   Gastrointestinal: Negative for abdominal pain, constipation, diarrhea, nausea and vomiting.   Genitourinary: Negative for dysuria, frequency and urgency.   Musculoskeletal: Negative for falls and myalgias (Wound site).   Skin: Negative for itching.   Neurological: Negative for dizziness, sensory change, weakness and headaches.   Psychiatric/Behavioral: Depression: at times. The patient does not have insomnia. Nervous/anxious: at times.    All other systems reviewed and are negative.       Physical Exam  Temp:  [36.2 °C (97.2 °F)-36.6 °C (97.9 °F)] 36.2 °C (97.2 °F)  Pulse:  [62-70] 62  Resp:  [17-18] 18  BP: (110-114)/(61-69) 114/68  SpO2:  [95 %-97 %] 97 %    Physical Exam  Vitals signs and nursing note reviewed. Exam conducted with a chaperone present.   Constitutional:       General: He is sleeping. He is not in acute distress.     Appearance: He is underweight. He is ill-appearing. He is not toxic-appearing or diaphoretic.   HENT:      Nose: Nose normal.      Mouth/Throat:      Lips: Pink.   Eyes:      General: No scleral icterus.     Conjunctiva/sclera: Conjunctivae normal.   Neck:      Musculoskeletal: Normal range of motion.   Pulmonary:      Effort: Pulmonary effort is normal.   Abdominal:      General: There is no distension.   Musculoskeletal:      Right  lower leg: No edema.      Left lower leg: No edema.      Comments: A   Skin:     General: Skin is warm and dry.      Comments: Wound Vac intact/functioning appropriately   Neurological:      Coordination: Coordination is intact.   Psychiatric:         Attention and Perception: Attention normal.         Mood and Affect: Mood normal.         Speech: Speech normal.         Behavior: Behavior is not aggressive or combative. Behavior is cooperative.         Cognition and Memory: Cognition is impaired. He exhibits impaired recent memory.      Comments:            Fluids    Intake/Output Summary (Last 24 hours) at 2/9/2021 1148  Last data filed at 2/8/2021 1300  Gross per 24 hour   Intake 240 ml   Output no documentation   Net 240 ml       Laboratory                        Imaging  None recent    Assessment/Plan  * Wound of left leg- (present on admission)  Assessment & Plan  -Wound vac - he intermittently dislodges. Requires ongoing education  -Further management per wound care/LPS/ortho.   -Pain control as needed.   improving    Encephalopathy- (present on admission)  Assessment & Plan  -Improved   -Per psychiatry and Dr. Velázquez on 1/11: Patient is incapacitated.  -Public Guardian: Coretta Jenkins   -Attempting placement     Psychiatric disorder- (present on admission)  Assessment & Plan  -Unspecified.  -Continue Risperdal and Depakote.  -Psychiatry has consulted and deemed him incapacitated to leave AMA or make medical decisions.  -Initial cognitive evaluation 8/20.   -SLP repeated Cognitive eval 1/2021- continue to recommend 24/7 supervision   -Public Guardian: Coretta Jenkins     Essential hypertension- (present on admission)  Assessment & Plan  -Well controlled on amlodipine.    Emphysema/COPD (HCC)- (present on admission)  Assessment & Plan  -Chronic and stable off medication, without acute exacerbation.    Protein malnutrition (HCC)- (present on admission)  Assessment & Plan  -Body mass index is 21.35 kg/m².    -Continue TID supplements.  -Encourage PO intake.    Flexion contracture of knee, 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)  Assessment & Plan  -Secondary to chronic wound in that area.  -PT/OT.  -Does not seem to limit his mobility.  Is frequently ambulatory.    Infection of wound due to methicillin resistant Staphylococcus aureus (MRSA)- (present on admission)  Assessment & Plan  -History of MRSA.  -Poor compliance with OP wound care. Poor compliance with wound vac at times.   -Continue pain control as needed.  -LPS and wound care following - debridements and wound VAC changes per their recommendations  -Cultures repeated 12/14 - positive for Proteus species, pan sensitive.  Completed regimen of augmentin.   resolved       VTE prophylaxis: Ambulatory     I have performed a physical exam and reviewed and updated ROS and Assessment/Plan today 2/9/2021. In review of the previous note there are no changes except as documented above    I certify the patient requires continued medically necessary hospital services for the treatment of non-healing wound and cognitive disorder.  The patient will remain in the hospital for the foreseeable future.  Discharge may or may not occur in the next 20 days due to ongoing discharge delays due to having no medically acceptable discharge options.    ISHA Leal.

## 2021-02-09 NOTE — CARE PLAN
Problem: Safety  Goal: Will remain free from injury  Outcome: PROGRESSING AS EXPECTED     Problem: Infection  Goal: Will remain free from infection  Outcome: PROGRESSING AS EXPECTED     Problem: Knowledge Deficit  Goal: Knowledge of disease process/condition, treatment plan, diagnostic tests, and medications will improve  Outcome: PROGRESSING AS EXPECTED     Problem: Fluid Volume:  Goal: Will maintain balanced intake and output  Outcome: PROGRESSING AS EXPECTED

## 2021-02-10 PROCEDURE — 700101 HCHG RX REV CODE 250: Performed by: NURSE PRACTITIONER

## 2021-02-10 PROCEDURE — 700102 HCHG RX REV CODE 250 W/ 637 OVERRIDE(OP): Performed by: NURSE PRACTITIONER

## 2021-02-10 PROCEDURE — A9270 NON-COVERED ITEM OR SERVICE: HCPCS | Performed by: NURSE PRACTITIONER

## 2021-02-10 PROCEDURE — 97605 NEG PRS WND THER DME<=50SQCM: CPT | Mod: XU

## 2021-02-10 PROCEDURE — 97597 DBRDMT OPN WND 1ST 20 CM/<: CPT

## 2021-02-10 PROCEDURE — 700111 HCHG RX REV CODE 636 W/ 250 OVERRIDE (IP): Performed by: NURSE PRACTITIONER

## 2021-02-10 PROCEDURE — 770001 HCHG ROOM/CARE - MED/SURG/GYN PRIV*

## 2021-02-10 RX ORDER — LIDOCAINE HYDROCHLORIDE 20 MG/ML
1 JELLY TOPICAL
Status: DISCONTINUED | OUTPATIENT
Start: 2021-02-10 | End: 2021-04-01

## 2021-02-10 RX ORDER — LIDOCAINE HYDROCHLORIDE 10 MG/ML
20 INJECTION, SOLUTION INFILTRATION; PERINEURAL
Status: DISCONTINUED | OUTPATIENT
Start: 2021-02-10 | End: 2021-03-05

## 2021-02-10 RX ADMIN — OXYCODONE HYDROCHLORIDE 10 MG: 10 TABLET ORAL at 21:55

## 2021-02-10 RX ADMIN — OXYCODONE HYDROCHLORIDE 10 MG: 10 TABLET ORAL at 04:54

## 2021-02-10 RX ADMIN — GABAPENTIN 300 MG: 300 CAPSULE ORAL at 11:56

## 2021-02-10 RX ADMIN — CYCLOBENZAPRINE 10 MG: 10 TABLET, FILM COATED ORAL at 17:12

## 2021-02-10 RX ADMIN — LIDOCAINE 1 PATCH: 50 PATCH CUTANEOUS at 11:56

## 2021-02-10 RX ADMIN — CYCLOBENZAPRINE 10 MG: 10 TABLET, FILM COATED ORAL at 10:19

## 2021-02-10 RX ADMIN — GABAPENTIN 300 MG: 300 CAPSULE ORAL at 04:54

## 2021-02-10 RX ADMIN — DIVALPROEX SODIUM 125 MG: 125 CAPSULE ORAL at 21:55

## 2021-02-10 RX ADMIN — OXYCODONE HYDROCHLORIDE 10 MG: 10 TABLET ORAL at 11:56

## 2021-02-10 RX ADMIN — HYDROXYZINE HYDROCHLORIDE 25 MG: 50 TABLET, FILM COATED ORAL at 01:05

## 2021-02-10 RX ADMIN — AMLODIPINE BESYLATE 5 MG: 5 TABLET ORAL at 04:54

## 2021-02-10 RX ADMIN — ENOXAPARIN SODIUM 40 MG: 40 INJECTION SUBCUTANEOUS at 04:55

## 2021-02-10 RX ADMIN — HYDROXYZINE HYDROCHLORIDE 25 MG: 50 TABLET, FILM COATED ORAL at 17:12

## 2021-02-10 RX ADMIN — RISPERIDONE 1 MG: 1 TABLET ORAL at 17:12

## 2021-02-10 RX ADMIN — RISPERIDONE 0.5 MG: 0.5 TABLET ORAL at 04:54

## 2021-02-10 RX ADMIN — DIVALPROEX SODIUM 125 MG: 125 CAPSULE ORAL at 04:54

## 2021-02-10 RX ADMIN — DIVALPROEX SODIUM 125 MG: 125 CAPSULE ORAL at 14:26

## 2021-02-10 RX ADMIN — GABAPENTIN 300 MG: 300 CAPSULE ORAL at 17:12

## 2021-02-10 ASSESSMENT — PAIN DESCRIPTION - PAIN TYPE
TYPE: ACUTE PAIN

## 2021-02-10 NOTE — CARE PLAN
Problem: Safety  Goal: Will remain free from injury  Outcome: PROGRESSING AS EXPECTED     Problem: Bowel/Gastric:  Goal: Normal bowel function is maintained or improved  Outcome: PROGRESSING AS EXPECTED     Problem: Fluid Volume:  Goal: Will maintain balanced intake and output  Outcome: PROGRESSING AS EXPECTED     Problem: Pain Management  Goal: Pain level will decrease to patient's comfort goal  Outcome: PROGRESSING AS EXPECTED

## 2021-02-10 NOTE — WOUND TEAM
[facility]  Inpatient Services  Wound and Skin Care Progress Note    Admission Date: 8/7/2020     Last order of IP CONSULT TO WOUND CARE was found on 9/1/2020 from Hospital Encounter on 8/7/2020     HPI, PMH, SH: Reviewed    Unit where seen by Wound Team: [id number]    WOUND CONSULT/FOLLOW UP RELATED TO:  Left leg scheduled negative pressure wound therapy (npwt) dressing change and 2 layer compression wrap change     OBJECTIVE: NPWT dressing and 2 layer compression wrap in place. Patient on pressure redistribution mattress, up self, ambulates frequently, turns self.    WOUND TYPE, LOCATION, CHARACTERISTICS (Pressure Injuries: location, stage, POA or date identified)   Negative Pressure Wound Therapy 11/20/20 Leg Lateral;Posterior;Medial Left (Active)   NPWT Pump Mode / Pressure Setting [place];Continuous;125 mmHg 02/10/21 1400   Dressing Type Medium;Black Foam (Regular) 02/10/21 1400   Number of Foam Pieces Used 3 02/10/21 1400   Canister Changed No 02/10/21 1400   Output (mL) 500 mL 02/03/21 1100   NEXT Dressing Change/Treatment Date 02/12/21 02/10/21 1400   WOUND NURSE ONLY - Time Spent with Patient (mins) 75 02/10/21 1400     Wound 11/19/20 Full Thickness Wound Leg Lateral;Posterior;Medial Left (Active)   Wound Image      02/05/21 1353   Site Assessment Red;[place] 02/10/21 1400   Periwound Assessment Scar tissue 02/10/21 1400   Margins Defined edges;Attached edges 02/10/21 1400   Closure Secondary intention 02/10/21 1400   Drainage Amount Moderate 02/10/21 1400   Drainage Description Serosanguineous 02/10/21 1400   Treatments Cleansed;Topical Lidocaine;CSWD - Conservative Sharp Wound Debridement 02/10/21 1400   Wound Cleansing Foam Cleanser/Washcloth 02/10/21 1400   Periwound Protectant Skin Protectant Wipes to Periwound;Drape 02/10/21 1400   Dressing Cleansing/Solutions Not Applicable 02/10/21 1400   Dressing Options Collagen Dressing;Wound Vac;Compression Wrap Two Layer 02/10/21 1400   Dressing Changed  Changed 02/10/21 1400   Dressing Status Dry;Clean;Intact 02/10/21 1400   Dressing Change/Treatment Frequency By Wound Team Only 02/10/21 1400   NEXT Dressing Change/Treatment Date 02/12/21 02/10/21 1400   NEXT Weekly Photo (Inpatient Only) 02/12/21 02/10/21 1400   Non-staged Wound Description Full thickness 02/10/21 1400   Wound Length (cm) 15 cm 02/03/21 1100   Wound Width (cm) 3.4 cm 02/03/21 1100   Wound Depth (cm) 0.2 cm 02/03/21 1100   Wound Surface Area (cm^2) 51 cm^2 02/03/21 1100   Wound Volume (cm^3) 10.2 cm^3 02/03/21 1100   Post-Procedure Length (cm) 15 cm 02/03/21 1100   Post-Procedure Width (cm) 3.4 cm 02/03/21 1100   Post-Procedure Depth (cm) 0.2 cm 02/03/21 1100   Post-Procedure Surface Area (cm^2) 51 cm^2 02/03/21 1100   Post-Procedure Volume (cm^3) 10.2 cm^3 02/03/21 1100   Wound Bed Granulation (%) 100 % 02/03/21 1100   Wound Bed Slough (%) 10 % 01/22/21 0912   Wound Bed Granulation (%) - Post-Procedure 100 % 02/03/21 1100   Tunneling (cm) 0 cm 02/10/21 1400   Undermining (cm) 0 cm 02/10/21 1400   Shape Irregular 02/10/21 1400   Wound Odor Mild 02/10/21 1400   Pulses Left;2+;DP;PT 02/03/21 1100   Exposed Structures None 02/10/21 1400   WOUND NURSE ONLY - Time Spent with Patient (mins) 75 02/10/21 1400     Lab Values:    Lab Results   Component Value Date/Time    WBC 6.5 11/22/2020 02:44 AM    RBC 3.54 (L) 11/22/2020 02:44 AM    HEMOGLOBIN 9.9 (L) 11/22/2020 02:44 AM    HEMATOCRIT 30.5 (L) 11/22/2020 02:44 AM    CREACTPROT 1.07 (H) 08/12/2020 01:55 PM    SEDRATEWES 85 (H) 08/07/2020 01:20 PM    HBA1C 5.7 (H) 11/09/2018 06:30 PM        Culture Results show:  Recent Results (from the past 720 hour(s))   CULTURE WOUND W/ GRAM STAIN    Collection Time: 09/01/20  2:00 PM    Specimen: Left Leg; Wound   Result Value Ref Range    Significant Indicator POS (POS)     Source WND     Site LEFT LEG     Culture Result - (A)     Gram Stain Result       Moderate Gram positive cocci.  Moderate Gram positive rods.       Culture Result (A)      Beta Hemolytic Streptococcus group A  Moderate growth      Culture Result (A)      Methicillin Resistant Staphylococcus aureus  Moderate growth      Culture Result (A)      Diphtheroids  Moderate growth  Mixed morphologies.     KOSTAS: 9/20/21   Left.    Doppler waveforms of the common femoral artery are of high amplitude and    triphasic.    Doppler waveforms at the ankle are brisk and triphasic.    Ankle-brachial index is normal.    Unable to obtained popliteal waveforms due to wound bandages.     Self Report / Pain Level: Pt c/o moderate pain at the most proximal posterior thigh. Patient pre-medicated with PO oxycodone about 2 hours prior to dressing change. Liquid lidocaine used while removing dressing and then lidocaine gel to the proximal posterior thigh. Stated no pain at conclusion of dressing change.     INTERVENTIONS BY WOUND TEAM: Wound care performed per standard procedures  Cleansed: Wound cleanser to wound beds and periwound  Debridement - CSWD with curette to remove slough and non viable tissue  Periwound - No sting skin prep and drape to periwound.   Primary - Vanessa to wound beds. Did not use saline to moisten as wounds are already very moist. 3 pieces of black foam to wound beds, and secured with drape, restarted NPWT 125mmHg, no leaks noted.   Secondary - 2 layer compression wrap foot/leg/thigh    Interdisciplinary consultation: Patient, Bedside RN (Williams), DOMI Rep Sloan, Wound care ARIS Gramajo    EVALUATION / RATIONALE FOR TREATMENT:     2/10/21: Periwound looks less denuded today. Did not place the arglaes powder to wound bed - switching to Vanessa to see if it will stimulate healing in his wound bed. Did not use hydrofiber to periwound but it is in the room for next visit in case it is needed. Ordered and used liquid lidocaine to remove vac dressing and lidocaine gel to the posterior proximal part of wound. Patient tolerated debridement well with lidocaine.   02/05/21: Meredith  wound is denuded and wet, vac drape lifting under wrap, Aquacel to dry wet and denuded skin, no silver foam available this dressing change.  02/03/21: Measurements are smaller for both medial and posterior thigh.  Wound bed has granulation and non-viable slough.  Will continue to debride every time with wound VAC changes to decrease bioburden in wound bed.   02/01/21:  No change.  Wounds appear to be improving slowly.      01/29/2021: wounds flush with periwound, fully granulated with scant yellow to a few areas. Medial periwound intact, lateral periwound with some erythema. LPS APRN suggested that next time applying brava strips to any irritated periwound may help reduce irritation.   1/27/21: Restarted NPWT, addison-wound looks better.   1/25/21: denuded tissue, vac vacation for until 1/27.  Re-assessment to place wound VAC to left LE.   1/21/21 area has decreased since last measurements.  Odor still present.  Addison skin compromised probably yeast infection under VAC drape.  Will hold VAC for 72 hours and re assess.  Nystatin to address yeast and dry out addison skin  1/15/21: position of leg may affect posterior thigh measurements.  Medial Leg wound area decreased.    01/10/2021: Wound vac replaced, patient accidentally removed vac and bedside RN was unable to repair.  Patient tolerated wound VAC placement. Patient had moderate ss with some green tinge drainage.   01/04/2021: 2 layer compression wrap re-applied to left LE due to ACE wrap leaving too much indentation to left LE.   12/31/2020 thigh wound much narrower.  Biofilm redeveloping and odor still present.   12/27/20: Wound bed granular and odor has lessen but still persists.   12/18/20 wound length decreased.  Odor persists.    12/14/2020: re-cultured wound bed.  12/11/2020 POD 23 Length of wounds has decreased, width has not changed.  Odor persists.  Pt continues to become angry with VAC when it limits his mobility.    12/7/2020 POD 19 odor persists, improved  wound bed after debridement.  Possible bacterial vs yeast vs fungal infection.  Culture taken.  Nystatin to treat possible fungal infection, silver foam to decrease microbial population.    12/4/2020 POD# 16 odor worsening, more yellow tissue present, will add nystatin and silver foam next week and consider a culture  11/30 POD#11 granular buds visible to wound bed.  Same as prior.   11/27 POD#8 wound is odorous likely related to biologic dressing.  No overt signs of infection.  Granular buds are visible through adaptic.  Edges do not appear as thick as they were prior to last sx.  Continue with current treatment.    11/23: POD#4 s/p I&D of left LE wounds and biologic placed by Dr. Olvera on 11/19.  Wound bed is flatter and raised edges scar tissue seems to be gone.   11/14 Posterior thigh aspect of wound deteriorating, will increase frequency of treatment to keep biofilm down and hopefully avoid systemic abx.  Will include thigh in compression wrap.  Will consider culture if improvement is not seen in the next few treatments.    11/10: APRN unavailable at this time.  Will re-schedule excisional debridement to next week.   11/5: Plan for debridement of scarred edges on Tues by LPS. Tendon exposed to posterior aspect of wound, behind knee. Continue with current dressing care.  10/29: Excisional debridement by Asaf ELLER of scar tissue along the proximal edge of the posterior knee and lateral thigh.  Lateral aspect of thigh is flatten.  Medial aspect of knee has thick scar tissue that was excisionally debrided by Asaf ELLER.  Fully granulated throughout the wound bed.   10/23 wound bed mostly granular, superficial in depth, progress has been slow with the wound VAC so will trial collagen product to encourage new tissue growth.    10/19: wound bed has improved in color and is fully granulated.  Meredith-wound has improved, denudation has resolved. APRN continuing with serial weekly debridements as needed.    10/16: wound friable pt now on iv abx per ID.  Indurated edges addressed with drape and foam.  Addison wound likely has yeast infection - addressing with silver powder crusting  10/14: patient seen with Asaf ELLER, stopping veraflo instillation.  Starting silver powder and regular wound VAC to see if it will help with bioburden.  Possible colonization of bacteria.  ID involved.   10/12: Inflammation noted to the proximal part of the wound bed.  Will continue to monitor, possible vac break on Wednesday to help addison-skin inflammation.   10/7: Excisional debridement performed by Asaf ELLER. Will need to continue selective debridement with NPWT changes, and weekly excisional debridement with APRN to flatten out the scar tissue.  IV abx therapy ended 10/5.   10/5: Lateral wound still with some slough, both wounds smaller per measurements. Medial wound with all granular tissue.  9/30: Patient to start abx therapy for 7 days course per Dr. Ellis.  Started dakin's instillation to veraflo  9/28: malodorous today, culture taken.    9/25: Odor noted, though unclear if from wound or pt, consider shower before next Vac change. Consider regular vac next change, wound granular and superficial.   9/23: Patient still awaiting guardianship for placement.   9/18: wound granulating nicely and responding well to current treatment.    9/16: Wound bed with granular tissue, edges are thick but appear better than previous assessments. . NPWT VF to encourage closure by secondary intention and manage drainage while encouraging oxygenation and granulation to the wound bed. 2 layer compression to assist in decrease of swelling and encourage blood flow to wounds. Wound team to follow up and change 3x/week.      Goals: Steady decrease in wound area and depth weekly.    NURSING PLAN OF CARE ORDERS (X):    Dressing changes: See Dressing Care orders: X  Skin care: See Skin Care orders:   Rectal tube care: See Rectal Tube Care  orders:   Other orders:      WOUND TEAM PLAN OF CARE:   Dressing changes by wound team:        Follow up 3 times weekly:                NPWT change 3 times weekly:  X,  NPWT changes 3x/ weekly with 2 layer compression wrap.  Follow up 1-2 times weekly:      Follow up Bi-Monthly:                   Follow up as needed:       Other (explain):     Anticipated discharge plans:  LTACH:        SNF/Rehab: X - patient will need ongoing wound care for LLE upon discharge - 3x/weekly           Home Health Care:           Outpatient Wound Center:            Self Care:

## 2021-02-11 PROCEDURE — A9270 NON-COVERED ITEM OR SERVICE: HCPCS | Performed by: NURSE PRACTITIONER

## 2021-02-11 PROCEDURE — 700111 HCHG RX REV CODE 636 W/ 250 OVERRIDE (IP): Performed by: NURSE PRACTITIONER

## 2021-02-11 PROCEDURE — 700101 HCHG RX REV CODE 250: Performed by: NURSE PRACTITIONER

## 2021-02-11 PROCEDURE — 700102 HCHG RX REV CODE 250 W/ 637 OVERRIDE(OP): Performed by: NURSE PRACTITIONER

## 2021-02-11 PROCEDURE — 770001 HCHG ROOM/CARE - MED/SURG/GYN PRIV*

## 2021-02-11 RX ADMIN — LIDOCAINE 1 PATCH: 50 PATCH CUTANEOUS at 11:58

## 2021-02-11 RX ADMIN — OXYCODONE HYDROCHLORIDE 10 MG: 10 TABLET ORAL at 23:42

## 2021-02-11 RX ADMIN — OXYCODONE HYDROCHLORIDE 10 MG: 10 TABLET ORAL at 12:00

## 2021-02-11 RX ADMIN — GABAPENTIN 300 MG: 300 CAPSULE ORAL at 04:21

## 2021-02-11 RX ADMIN — RISPERIDONE 1 MG: 1 TABLET ORAL at 17:54

## 2021-02-11 RX ADMIN — DIVALPROEX SODIUM 125 MG: 125 CAPSULE ORAL at 21:02

## 2021-02-11 RX ADMIN — ENOXAPARIN SODIUM 40 MG: 40 INJECTION SUBCUTANEOUS at 04:21

## 2021-02-11 RX ADMIN — GABAPENTIN 300 MG: 300 CAPSULE ORAL at 11:57

## 2021-02-11 RX ADMIN — DIVALPROEX SODIUM 125 MG: 125 CAPSULE ORAL at 04:21

## 2021-02-11 RX ADMIN — DIVALPROEX SODIUM 125 MG: 125 CAPSULE ORAL at 14:09

## 2021-02-11 RX ADMIN — OXYCODONE HYDROCHLORIDE 10 MG: 10 TABLET ORAL at 17:54

## 2021-02-11 RX ADMIN — GABAPENTIN 300 MG: 300 CAPSULE ORAL at 17:54

## 2021-02-11 RX ADMIN — RISPERIDONE 0.5 MG: 0.5 TABLET ORAL at 04:21

## 2021-02-11 ASSESSMENT — PAIN DESCRIPTION - PAIN TYPE
TYPE: ACUTE PAIN

## 2021-02-11 NOTE — DISCHARGE PLANNING
"LSW received two copies of \"Letters of Guardianship of Person and Estate\".  Copy in envelope addressed to pt placed with pt's other correspondence in hard chart.  SOCORRO Membreno confirmed upload into Epic, [facility] copy placed in pt's hard chart.    LSW received fax from pt's court appointed [facility] Public Guardian, Aletha Grady, which includes the court orders/letters w/ contact info and guardianship office communication guidelines. SOCORRO Membreno confirmed upload into [facility], copy placed in pt's hard chart.    [facility] Public Guardian  Aletha Grady  (P): [phone]  (F): [phone]  (E): [email or web address].    "

## 2021-02-11 NOTE — CARE PLAN
Problem: Pain Management  Goal: Pain level will decrease to patient's comfort goal  Outcome: PROGRESSING AS EXPECTED  Flowsheets  Taken 2/11/2021 0736 by Bakari Segura RRachaelNRachael  Non Verbal Scale:   Calm   Unlabored Breathing   Sleeping  Taken 2/11/2021 0655 by Coretta Bailey R.N.  Comfort Goal:   Comfort with Movement   Sleep Comfortably  Note:  Patient sleeping comfortably at this time. Will continue to monitor for pain and response to pain interventions.      Problem: Safety  Goal: Will remain free from injury  Outcome: PROGRESSING AS EXPECTED  Note:  Treaded socks on now in bed, shoes on when up.  Side rails up x2.  Bed  in low, locked position, call light within reach, patient does not call appropriately and instead comes into hallway looking for staff. Education provided about call light use,  patient verbalizes understanding and still does not use,  Hourly rounding in place.

## 2021-02-11 NOTE — CARE PLAN
Problem: Safety  Goal: Will remain free from injury  Outcome: PROGRESSING AS EXPECTED  Note: Non-slip socks are on, bed is locked and in lowest position, personal belongings are within reach, room is free of clutter and spills, call light is in place. Patient educated on how to use the call light and how to call for assistance. Patient verbalized understanding.    Goal: Will remain free from falls  Description: Pt mobilizes frequently independently.   Outcome: PROGRESSING AS EXPECTED  Note: Educated on fall risk and ensured fall precautions in place. Bed in lowest position, treaded socks in place, call light in reach, bed alarm in place, room free of clutter.      Problem: Infection  Goal: Will remain free from infection  Outcome: PROGRESSING AS EXPECTED  Note: Standard precautions in place. Cleansed hands before and after patient care to prevent the spread of germs.

## 2021-02-12 PROCEDURE — 700101 HCHG RX REV CODE 250: Performed by: NURSE PRACTITIONER

## 2021-02-12 PROCEDURE — 770001 HCHG ROOM/CARE - MED/SURG/GYN PRIV*

## 2021-02-12 PROCEDURE — 700102 HCHG RX REV CODE 250 W/ 637 OVERRIDE(OP): Performed by: NURSE PRACTITIONER

## 2021-02-12 PROCEDURE — A9270 NON-COVERED ITEM OR SERVICE: HCPCS | Performed by: NURSE PRACTITIONER

## 2021-02-12 PROCEDURE — 97597 DBRDMT OPN WND 1ST 20 CM/<: CPT

## 2021-02-12 PROCEDURE — 700111 HCHG RX REV CODE 636 W/ 250 OVERRIDE (IP): Performed by: NURSE PRACTITIONER

## 2021-02-12 RX ADMIN — OXYCODONE HYDROCHLORIDE 10 MG: 10 TABLET ORAL at 06:14

## 2021-02-12 RX ADMIN — OXYCODONE HYDROCHLORIDE 10 MG: 10 TABLET ORAL at 18:15

## 2021-02-12 RX ADMIN — DIVALPROEX SODIUM 125 MG: 125 CAPSULE ORAL at 05:34

## 2021-02-12 RX ADMIN — DIVALPROEX SODIUM 125 MG: 125 CAPSULE ORAL at 12:01

## 2021-02-12 RX ADMIN — ENOXAPARIN SODIUM 40 MG: 40 INJECTION SUBCUTANEOUS at 05:34

## 2021-02-12 RX ADMIN — OXYCODONE HYDROCHLORIDE 10 MG: 10 TABLET ORAL at 11:59

## 2021-02-12 RX ADMIN — GABAPENTIN 300 MG: 300 CAPSULE ORAL at 11:59

## 2021-02-12 RX ADMIN — RISPERIDONE 0.5 MG: 0.5 TABLET ORAL at 05:34

## 2021-02-12 RX ADMIN — GABAPENTIN 300 MG: 300 CAPSULE ORAL at 05:34

## 2021-02-12 RX ADMIN — DIVALPROEX SODIUM 125 MG: 125 CAPSULE ORAL at 23:17

## 2021-02-12 RX ADMIN — RISPERIDONE 1 MG: 1 TABLET ORAL at 18:14

## 2021-02-12 RX ADMIN — GABAPENTIN 300 MG: 300 CAPSULE ORAL at 18:14

## 2021-02-12 RX ADMIN — LIDOCAINE 1 PATCH: 50 PATCH CUTANEOUS at 12:00

## 2021-02-12 RX ADMIN — AMLODIPINE BESYLATE 5 MG: 5 TABLET ORAL at 05:34

## 2021-02-12 ASSESSMENT — PAIN DESCRIPTION - PAIN TYPE
TYPE: ACUTE PAIN

## 2021-02-12 NOTE — PROGRESS NOTES
Hospital Medicine Twice Weekly Progress Note    Date of Service  2/12/2021    Chief Complaint  Wound Infection    Hospital Course  Mr. Varela is a 65-year-old male with PMH alcohol dependence, tobacco dependence, psychiatric disorder, and HTN who presented to the emergency department 8/7/2020 with a left lower extremity wound infection. He was hospitalized at our facility in April and evaluated by orthopedic surgery who recommended wound care. Wound cultures at that time grew MSSA and Streptococcus. He had been seen at [facility] [place] earlier that week and prescribed antibiotics, however he had not filled the prescription.  An ultrasound of that extremity was done and was negative for DVT.  He was treated for sepsis and completed an antibiotic course on 9/16/2020. He was also found to have head lice and underwent treatment for that.  A repeat wound culture was done on 9/28/2020 and grew Strep A and Proteus. Infectious disease was consulted and recommended a 5-day course of IV zosyn which was completed on 10/5/2020. On 10/12/2020 the wound care team noticed increased inflammation to the proximal part of the wound bed and switched from a vera flow wound VAC to a regular wound VAC.  ID was again consulted and on 10/14/2020 recommended to 7-day course of antibiotics with meropenem which was completed on 10/22/2020. Additionally, he was noted to have intermittent agitation so was started on risperdal, depakote, and gabapentin per psychiatric recommendations.  On 11/20/2020 he underwent left lower extremity debridement with wound VAC placement. Since then he has remained stable.  He has been deemed incapacitated to make medical decisions and guardianship was granted on 1/29/21. Placement will likely be in a group home.     Interval Problem Update  2/12: Patient independent with ambulation.  Wound VAC to left lower extremity and dressing is intact.  Patient is complaining of left posterior knee pain.  There is no  evidence of erythema or edema proximal to his dressing.  Physical exam is otherwise unchanged.  Guardian and placement ongoing as the patient lacks capacity.  Wound care is planning dressing change today.  VSS and WNL.  No other labs or diagnostics.    2/9: Patient ambulating around room, pleasant and cooperative.  His pain is well managed on current regimen.  -Leg wound continues to improve with 3 times/week debridement and wound VAC.   -Guardian to establish placement, likely group home  -VSS and WNL; no recent labs or imaging    Consultants/Specialty  -LPS  -Psychiatry  -Infectious Disease    Code Status  Full Code    Disposition  guardianship granted 1/29/21. SW/CM may attempt to start working on placement.  Obtaining financials.    Review of Systems  Review of Systems   Unable to perform ROS: Mental acuity        Physical Exam  Temp:  [36.4 °C (97.5 °F)-36.6 °C (97.8 °F)] 36.6 °C (97.8 °F)  Pulse:  [78-88] 88  Resp:  [16-18] 16  BP: ()/(51-88) 125/88  SpO2:  [96 %-98 %] 96 %    Physical Exam  Vitals and nursing note reviewed.   HENT:      Head: Normocephalic and atraumatic.      Nose: Nose normal.   Eyes:      Conjunctiva/sclera: Conjunctivae normal.      Pupils: Pupils are equal, round, and reactive to light.   Cardiovascular:      Rate and Rhythm: Normal rate and regular rhythm.      Heart sounds: No murmur. No friction rub.   Pulmonary:      Effort: No respiratory distress.   Abdominal:      General: Bowel sounds are normal. There is no distension.      Palpations: Abdomen is soft.   Musculoskeletal:      Cervical back: Neck supple.      Comments: LLE compression wrap dressing with negative pressure wound VAC CDI   Skin:     General: Skin is warm and dry.   Neurological:      Mental Status: He is alert. He is confused.   Psychiatric:         Attention and Perception: Attention normal.         Mood and Affect: Mood normal.         Speech: Speech normal.         Thought Content: Thought content is  delusional.         Fluids    Intake/Output Summary (Last 24 hours) at 2/12/2021 1257  Last data filed at 2/11/2021 2114  Gross per 24 hour   Intake 480 ml   Output no documentation   Net 480 ml       Laboratory                        Imaging  None recent    Assessment/Plan  * Wound of left leg- (present on admission)  Assessment & Plan  -Wound vac - he intermittently dislodges. Requires ongoing education  -Further management per wound care/LPS/ortho.   -Pain control as needed.   improving    Encephalopathy- (present on admission)  Assessment & Plan  -Improved   -Per psychiatry and Dr. Velázquez on 1/11: Patient is incapacitated.  -Public Guardian: Coretta   -Attempting placement     Psychiatric disorder- (present on admission)  Assessment & Plan  -Unspecified.  -Continue Risperdal and Depakote.  -Psychiatry has consulted and deemed him incapacitated to leave AMA or make medical decisions.  -Initial cognitive evaluation 8/20.   -SLP repeated Cognitive eval 1/2021- continue to recommend 24/7 supervision   -Public Guardian: Coretta     Essential hypertension- (present on admission)  Assessment & Plan  -Well controlled on amlodipine.    Emphysema/COPD (HCC)- (present on admission)  Assessment & Plan  -Chronic and stable off medication, without acute exacerbation.    Protein malnutrition (HCC)- (present on admission)  Assessment & Plan  -Body mass index is 21.35 kg/m².   -Continue TID supplements.  -Encourage PO intake.    Flexion contracture of knee, 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)  Assessment & Plan  -Secondary to chronic wound in that area.  -PT/OT.  -Does not seem to limit his mobility.  Is frequently ambulatory.    Infection of wound due to methicillin resistant Staphylococcus aureus (MRSA)- (present on admission)  Assessment & Plan  -History of MRSA.  -Poor compliance with OP wound care. Poor compliance with wound vac at times.   -Continue pain control as needed.  -LPS and wound care following -  debridements and wound VAC changes per their recommendations  -Cultures repeated 12/14 - positive for Proteus species, pan sensitive.  Completed regimen of augmentin.   resolved       VTE prophylaxis: Ambulatory     I have performed a physical exam and reviewed and updated ROS and Assessment/Plan today 2/12/2021. In review of the previous note there are no changes except as documented above    I certify the patient requires continued medically necessary hospital services for the treatment of non-healing wound and cognitive disorder.  The patient will remain in the hospital for the foreseeable future.  Discharge may or may not occur in the next 20 days due to ongoing discharge delays due to having no medically acceptable discharge options.    ISHA Leal.

## 2021-02-12 NOTE — CARE PLAN
Problem: Safety  Goal: Will remain free from injury  Outcome: PROGRESSING AS EXPECTED  Pt has had no falls/injuries this shift. Pt able to ambulate on own with no complications. Will continue to perform hourly rounding.      Problem: Pain Management  Goal: Pain level will decrease to patient's comfort goal  Outcome: PROGRESSING AS EXPECTED   Pt pain has been controlled with PRN pain meds, see MAR. Pt understands to verbalize if pain increases above 4.

## 2021-02-13 PROCEDURE — 700102 HCHG RX REV CODE 250 W/ 637 OVERRIDE(OP): Performed by: NURSE PRACTITIONER

## 2021-02-13 PROCEDURE — A9270 NON-COVERED ITEM OR SERVICE: HCPCS | Performed by: NURSE PRACTITIONER

## 2021-02-13 PROCEDURE — 770001 HCHG ROOM/CARE - MED/SURG/GYN PRIV*

## 2021-02-13 PROCEDURE — 700111 HCHG RX REV CODE 636 W/ 250 OVERRIDE (IP): Performed by: NURSE PRACTITIONER

## 2021-02-13 RX ADMIN — ENOXAPARIN SODIUM 40 MG: 40 INJECTION SUBCUTANEOUS at 06:12

## 2021-02-13 RX ADMIN — DIVALPROEX SODIUM 125 MG: 125 CAPSULE ORAL at 06:12

## 2021-02-13 RX ADMIN — DIVALPROEX SODIUM 125 MG: 125 CAPSULE ORAL at 23:09

## 2021-02-13 RX ADMIN — DIVALPROEX SODIUM 125 MG: 125 CAPSULE ORAL at 15:32

## 2021-02-13 RX ADMIN — GABAPENTIN 300 MG: 300 CAPSULE ORAL at 16:01

## 2021-02-13 RX ADMIN — OXYCODONE HYDROCHLORIDE 10 MG: 10 TABLET ORAL at 15:32

## 2021-02-13 RX ADMIN — RISPERIDONE 0.5 MG: 0.5 TABLET ORAL at 06:12

## 2021-02-13 RX ADMIN — AMLODIPINE BESYLATE 5 MG: 5 TABLET ORAL at 06:12

## 2021-02-13 RX ADMIN — RISPERIDONE 1 MG: 1 TABLET ORAL at 16:01

## 2021-02-13 RX ADMIN — CYCLOBENZAPRINE 10 MG: 10 TABLET, FILM COATED ORAL at 16:01

## 2021-02-13 RX ADMIN — GABAPENTIN 300 MG: 300 CAPSULE ORAL at 06:12

## 2021-02-13 RX ADMIN — OXYCODONE HYDROCHLORIDE 10 MG: 10 TABLET ORAL at 23:09

## 2021-02-13 ASSESSMENT — PAIN DESCRIPTION - PAIN TYPE
TYPE: ACUTE PAIN

## 2021-02-13 NOTE — CARE PLAN
Problem: Safety  Goal: Will remain free from injury  Outcome: PROGRESSING AS EXPECTED     Problem: Infection  Goal: Will remain free from infection  Outcome: PROGRESSING AS EXPECTED     Problem: Knowledge Deficit  Goal: Knowledge of disease process/condition, treatment plan, diagnostic tests, and medications will improve  Outcome: PROGRESSING AS EXPECTED     Problem: Discharge Barriers/Planning  Goal: Patient's continuum of care needs will be met  Outcome: PROGRESSING AS EXPECTED     Problem: Pain Management  Goal: Pain level will decrease to patient's comfort goal  Outcome: PROGRESSING AS EXPECTED

## 2021-02-13 NOTE — WOUND TEAM
Saw pt with Marci Shaw RN.  Sharp debrided yellow slough with scalpel and currette < 20 cm2 to reveal red tissue.  See Marci's note for details

## 2021-02-13 NOTE — WOUND TEAM
[facility]  Inpatient Services  Wound and Skin Care Progress Note    Admission Date: 8/7/2020     Last order of IP CONSULT TO WOUND CARE was found on 9/1/2020 from Hospital Encounter on 8/7/2020     HPI, PMH, SH: Reviewed    Unit where seen by Wound Team: [id number]    WOUND CONSULT/FOLLOW UP RELATED TO:  Left leg scheduled negative pressure wound therapy (npwt) dressing change and 2 layer compression wrap change     OBJECTIVE: NPWT dressing and 2 layer compression wrap in place. Patient on pressure redistribution mattress, up self, ambulates frequently, turns self.    WOUND TYPE, LOCATION, CHARACTERISTICS (Pressure Injuries: location, stage, POA or date identified)      Negative Pressure Wound Therapy 11/20/20 Leg Lateral;Posterior;Medial Left (Active)   NPWT Pump Mode / Pressure Setting Ulta;Continuous;175 mmHg    Dressing Type Medium    Number of Foam Pieces Used 4    Canister Changed Yes    Output (mL) 500 mL    NEXT Dressing Change/Treatment Date 02/15/21    WOUND NURSE ONLY - Time Spent with Patient (mins) 90            Wound 11/19/20 Full Thickness Wound Leg Lateral;Posterior;Medial Left (Active)   Wound Image       Site Assessment Pink;Red;Drainage    Periwound Assessment Scar tissue    Margins Attached edges;Defined edges    Closure Secondary intention    Drainage Amount Moderate    Drainage Description Serosanguineous    Treatments Cleansed;Site care;Compression    Wound Cleansing Foam Cleanser/Washcloth    Periwound Protectant Drape;Benzoin    Dressing Cleansing/Solutions Not Applicable    Dressing Options Collagen Dressing;Wound Vac;Compression Wrap Two Layer    Dressing Changed Changed    Dressing Status Clean;Dry;Intact    Dressing Change/Treatment Frequency Monday, Wednesday, Friday, and As Needed    NEXT Dressing Change/Treatment Date 02/15/21    NEXT Weekly Photo (Inpatient Only) 02/19/21    Non-staged Wound Description Full thickness    Wound Length (cm) 15 cm    Wound Width (cm) 3 cm     Wound Depth (cm) 0.1 cm    Wound Surface Area (cm^2) 45 cm^2    Wound Volume (cm^3) 4.5 cm^3    Post-Procedure Length (cm) 15 cm    Post-Procedure Width (cm) 3.4 cm    Post-Procedure Depth (cm) 0.2 cm    Post-Procedure Surface Area (cm^2) 51 cm^2    Post-Procedure Volume (cm^3) 10.2 cm^3    Wound Healing % 56    Wound Bed Granulation (%) 100 %    Wound Bed Slough (%) 10 %    Wound Bed Granulation (%) - Post-Procedure 100 %    Tunneling (cm) 0 cm    Undermining (cm) 0 cm    Shape irregular    Wound Odor Mild    Pulses Left;1+    Exposed Structures None    WOUND NURSE ONLY - Time Spent with Patient (mins) 90             Lab Values:    Lab Results   Component Value Date/Time    WBC 6.5 11/22/2020 02:44 AM    RBC 3.54 (L) 11/22/2020 02:44 AM    HEMOGLOBIN 9.9 (L) 11/22/2020 02:44 AM    HEMATOCRIT 30.5 (L) 11/22/2020 02:44 AM    CREACTPROT 1.07 (H) 08/12/2020 01:55 PM    SEDRATEWES 85 (H) 08/07/2020 01:20 PM    HBA1C 5.7 (H) 11/09/2018 06:30 PM        Culture Results show:  Recent Results (from the past 720 hour(s))   CULTURE WOUND W/ GRAM STAIN    Collection Time: 09/01/20  2:00 PM    Specimen: Left Leg; Wound   Result Value Ref Range    Significant Indicator POS (POS)     Source WND     Site LEFT LEG     Culture Result - (A)     Gram Stain Result       Moderate Gram positive cocci.  Moderate Gram positive rods.      Culture Result (A)      Beta Hemolytic Streptococcus group A  Moderate growth      Culture Result (A)      Methicillin Resistant Staphylococcus aureus  Moderate growth      Culture Result (A)      Diphtheroids  Moderate growth  Mixed morphologies.     KOSTAS: 9/20/21   Left.    Doppler waveforms of the common femoral artery are of high amplitude and    triphasic.    Doppler waveforms at the ankle are brisk and triphasic.    Ankle-brachial index is normal.    Unable to obtained popliteal waveforms due to wound bandages.     Self Report / Pain Level: Pt c/o moderate pain at the most proximal posterior thigh.  Patient pre-medicated with PO oxycodone about 2 hours prior to dressing change. Liquid lidocaine used while removing dressing and then lidocaine gel to the proximal posterior thigh. Stated no pain at conclusion of dressing change.     INTERVENTIONS BY WOUND TEAM: Wound care performed per standard procedures  Cleansed: Wound cleanser to wound beds and periwound  Debridement - CSWD with curette to remove slough and non viable tissue  Periwound - No sting skin prep and drape to periwound.   Primary - Vanessa to wound beds. Did not use saline to moisten as wounds are already very moist. 3 pieces of silver foam to wound beds, and secured with drape, restarted NPWT 125mmHg, no leaks noted.   Secondary - 2 layer compression wrap foot/leg/thigh    Interdisciplinary consultation: Patient, Bedside RN   Wound care Mrianda    EVALUATION / RATIONALE FOR TREATMENT:         2/12/2021:periwound intact does not appear macerated.  Moderate amount of drainage. Wound bed debrided.   Picture frame draped over periwound, three silver foams used.  Continue NPWT and two layer compression.          2/10/21: Periwound looks less denuded today. Did not place the arglaes powder to wound bed - switching to Vanessa to see if it will stimulate healing in his wound bed. Did not use hydrofiber to periwound but it is in the room for next visit in case it is needed. Ordered and used liquid lidocaine to remove vac dressing and lidocaine gel to the posterior proximal part of wound. Patient tolerated debridement well with lidocaine.   02/05/21: Meredith wound is denuded and wet, vac drape lifting under wrap, Aquacel to dry wet and denuded skin, no silver foam available this dressing change.  02/03/21: Measurements are smaller for both medial and posterior thigh.  Wound bed has granulation and non-viable slough.  Will continue to debride every time with wound VAC changes to decrease bioburden in wound bed.   02/01/21:  No change.  Wounds appear to be improving  slowly.      01/29/2021: wounds flush with periwound, fully granulated with scant yellow to a few areas. Medial periwound intact, lateral periwound with some erythema. LPS APRN suggested that next time applying brava strips to any irritated periwound may help reduce irritation.   1/27/21: Restarted NPWT, addison-wound looks better.   1/25/21: denuded tissue, vac vacation for until 1/27.  Re-assessment to place wound VAC to left LE.   1/21/21 area has decreased since last measurements.  Odor still present.  Addison skin compromised probably yeast infection under VAC drape.  Will hold VAC for 72 hours and re assess.  Nystatin to address yeast and dry out addison skin  1/15/21: position of leg may affect posterior thigh measurements.  Medial Leg wound area decreased.    01/10/2021: Wound vac replaced, patient accidentally removed vac and bedside RN was unable to repair.  Patient tolerated wound VAC placement. Patient had moderate ss with some green tinge drainage.   01/04/2021: 2 layer compression wrap re-applied to left LE due to ACE wrap leaving too much indentation to left LE.   12/31/2020 thigh wound much narrower.  Biofilm redeveloping and odor still present.   12/27/20: Wound bed granular and odor has lessen but still persists.   12/18/20 wound length decreased.  Odor persists.    12/14/2020: re-cultured wound bed.  12/11/2020 POD 23 Length of wounds has decreased, width has not changed.  Odor persists.  Pt continues to become angry with VAC when it limits his mobility.    12/7/2020 POD 19 odor persists, improved wound bed after debridement.  Possible bacterial vs yeast vs fungal infection.  Culture taken.  Nystatin to treat possible fungal infection, silver foam to decrease microbial population.    12/4/2020 POD# 16 odor worsening, more yellow tissue present, will add nystatin and silver foam next week and consider a culture  11/30 POD#11 granular buds visible to wound bed.  Same as prior.   11/27 POD#8 wound is odorous  likely related to biologic dressing.  No overt signs of infection.  Granular buds are visible through adaptic.  Edges do not appear as thick as they were prior to last sx.  Continue with current treatment.    11/23: POD#4 s/p I&D of left LE wounds and biologic placed by Dr. Olvera on 11/19.  Wound bed is flatter and raised edges scar tissue seems to be gone.   11/14 Posterior thigh aspect of wound deteriorating, will increase frequency of treatment to keep biofilm down and hopefully avoid systemic abx.  Will include thigh in compression wrap.  Will consider culture if improvement is not seen in the next few treatments.    11/10: APRN unavailable at this time.  Will re-schedule excisional debridement to next week.   11/5: Plan for debridement of scarred edges on Tues by LPS. Tendon exposed to posterior aspect of wound, behind knee. Continue with current dressing care.  10/29: Excisional debridement by Asaf ELLER of scar tissue along the proximal edge of the posterior knee and lateral thigh.  Lateral aspect of thigh is flatten.  Medial aspect of knee has thick scar tissue that was excisionally debrided by Asaf ELLER.  Fully granulated throughout the wound bed.   10/23 wound bed mostly granular, superficial in depth, progress has been slow with the wound VAC so will trial collagen product to encourage new tissue growth.    10/19: wound bed has improved in color and is fully granulated.  Meredith-wound has improved, denudation has resolved. APRN continuing with serial weekly debridements as needed.   10/16: wound friable pt now on iv abx per ID.  Indurated edges addressed with drape and foam.  Meredith wound likely has yeast infection - addressing with silver powder crusting  10/14: patient seen with Asaf ELLER, stopping veraflo instillation.  Starting silver powder and regular wound VAC to see if it will help with bioburden.  Possible colonization of bacteria.  ID involved.   10/12: Inflammation noted  to the proximal part of the wound bed.  Will continue to monitor, possible vac break on Wednesday to help addison-skin inflammation.   10/7: Excisional debridement performed by Asaf ELLER. Will need to continue selective debridement with NPWT changes, and weekly excisional debridement with APRN to flatten out the scar tissue.  IV abx therapy ended 10/5.   10/5: Lateral wound still with some slough, both wounds smaller per measurements. Medial wound with all granular tissue.  9/30: Patient to start abx therapy for 7 days course per Dr. Ellis.  Started dakin's instillation to veraflo  9/28: malodorous today, culture taken.    9/25: Odor noted, though unclear if from wound or pt, consider shower before next Vac change. Consider regular vac next change, wound granular and superficial.   9/23: Patient still awaiting guardianship for placement.   9/18: wound granulating nicely and responding well to current treatment.    9/16: Wound bed with granular tissue, edges are thick but appear better than previous assessments. . NPWT VF to encourage closure by secondary intention and manage drainage while encouraging oxygenation and granulation to the wound bed. 2 layer compression to assist in decrease of swelling and encourage blood flow to wounds. Wound team to follow up and change 3x/week.      Goals: Steady decrease in wound area and depth weekly.    NURSING PLAN OF CARE ORDERS (X):    Dressing changes: See Dressing Care orders: X  Skin care: See Skin Care orders:   Rectal tube care: See Rectal Tube Care orders:   Other orders:      WOUND TEAM PLAN OF CARE:   Dressing changes by wound team:        Follow up 3 times weekly:                NPWT change 3 times weekly:  X,  NPWT changes 3x/ weekly with 2 layer compression wrap.  Follow up 1-2 times weekly:      Follow up Bi-Monthly:                   Follow up as needed:       Other (explain):     Anticipated discharge plans:  LTACH:        SNF/Rehab: X - patient will  need ongoing wound care for LLE upon discharge - 3x/weekly           Home Health Care:           Outpatient Wound Center:            Self Care:

## 2021-02-13 NOTE — PROGRESS NOTES
Report received from Day RN at [phone]. Assumed care of patient. Plan of care discussed, questions answered. Assessment completed. VSS, A&O x4, denies pain. PRN med given. Call light in reach. Patient educated on use of call light for assistance.    Wound vac in place.

## 2021-02-14 PROCEDURE — A9270 NON-COVERED ITEM OR SERVICE: HCPCS | Performed by: NURSE PRACTITIONER

## 2021-02-14 PROCEDURE — 700101 HCHG RX REV CODE 250: Performed by: NURSE PRACTITIONER

## 2021-02-14 PROCEDURE — 700102 HCHG RX REV CODE 250 W/ 637 OVERRIDE(OP): Performed by: NURSE PRACTITIONER

## 2021-02-14 PROCEDURE — 700111 HCHG RX REV CODE 636 W/ 250 OVERRIDE (IP): Performed by: NURSE PRACTITIONER

## 2021-02-14 PROCEDURE — 770001 HCHG ROOM/CARE - MED/SURG/GYN PRIV*

## 2021-02-14 RX ADMIN — HYDROXYZINE HYDROCHLORIDE 25 MG: 50 TABLET, FILM COATED ORAL at 03:29

## 2021-02-14 RX ADMIN — DIVALPROEX SODIUM 125 MG: 125 CAPSULE ORAL at 16:16

## 2021-02-14 RX ADMIN — RISPERIDONE 1 MG: 1 TABLET ORAL at 16:17

## 2021-02-14 RX ADMIN — AMLODIPINE BESYLATE 5 MG: 5 TABLET ORAL at 06:08

## 2021-02-14 RX ADMIN — OXYCODONE HYDROCHLORIDE 10 MG: 10 TABLET ORAL at 22:18

## 2021-02-14 RX ADMIN — GABAPENTIN 300 MG: 300 CAPSULE ORAL at 06:08

## 2021-02-14 RX ADMIN — CYCLOBENZAPRINE 10 MG: 10 TABLET, FILM COATED ORAL at 03:29

## 2021-02-14 RX ADMIN — RISPERIDONE 0.5 MG: 0.5 TABLET ORAL at 06:08

## 2021-02-14 RX ADMIN — OXYCODONE HYDROCHLORIDE 10 MG: 10 TABLET ORAL at 16:17

## 2021-02-14 RX ADMIN — DIVALPROEX SODIUM 125 MG: 125 CAPSULE ORAL at 22:18

## 2021-02-14 RX ADMIN — DIVALPROEX SODIUM 125 MG: 125 CAPSULE ORAL at 06:08

## 2021-02-14 RX ADMIN — LIDOCAINE 1 PATCH: 50 PATCH CUTANEOUS at 12:18

## 2021-02-14 RX ADMIN — GABAPENTIN 300 MG: 300 CAPSULE ORAL at 12:18

## 2021-02-14 RX ADMIN — GABAPENTIN 300 MG: 300 CAPSULE ORAL at 16:17

## 2021-02-14 ASSESSMENT — PAIN DESCRIPTION - PAIN TYPE
TYPE: ACUTE PAIN;SURGICAL PAIN

## 2021-02-14 NOTE — PROGRESS NOTES
Report received from Day RN at [id number]. Assumed care of patient. Plan of care discussed, questions answered. Assessment completed. VSS, A&O x4, states pain on left lower extremity. PRN med given. Call light in reach. Patient educated on use of call light for assistance.    Wound vac in place.

## 2021-02-14 NOTE — CARE PLAN
Problem: Safety  Goal: Will remain free from injury  Outcome: PROGRESSING AS EXPECTED     Problem: Discharge Barriers/Planning  Goal: Patient's continuum of care needs will be met  Outcome: PROGRESSING AS EXPECTED     Problem: Pain Management  Goal: Pain level will decrease to patient's comfort goal  Outcome: PROGRESSING AS EXPECTED

## 2021-02-14 NOTE — CARE PLAN
Problem: Safety  Goal: Will remain free from injury  Outcome: PROGRESSING AS EXPECTED   Patient ambulates ad erika in room and halls.  Steady gait.    Problem: Venous Thromboembolism (VTW)/Deep Vein Thrombosis (DVT) Prevention:  Goal: Patient will participate in Venous Thrombosis (VTE)/Deep Vein Thrombosis (DVT)Prevention Measures  Outcome: PROGRESSING AS EXPECTED   Patient refuses SCDs, but is ambulatory and ambulates often in the room and halls.    Problem: Knowledge Deficit  Goal: Knowledge of the prescribed therapeutic regimen will improve  Outcome: PROGRESSING SLOWER THAN EXPECTED   Confused and forgetful.  Needs to be reoriented often and reminded of medication regimen and plan of care.

## 2021-02-14 NOTE — PROGRESS NOTES
Received report and assumed pt care.  A&O to self.  Treaded socks on.  Call light and personal belongings within reach.  Bed locked and at lowest position.  GATES.  VSS.  Ad erika in room and hallways.  Wound vac in use.

## 2021-02-15 PROCEDURE — A9270 NON-COVERED ITEM OR SERVICE: HCPCS | Performed by: NURSE PRACTITIONER

## 2021-02-15 PROCEDURE — 97606 NEG PRS WND THER DME>50 SQCM: CPT

## 2021-02-15 PROCEDURE — 700101 HCHG RX REV CODE 250: Performed by: NURSE PRACTITIONER

## 2021-02-15 PROCEDURE — 770001 HCHG ROOM/CARE - MED/SURG/GYN PRIV*

## 2021-02-15 PROCEDURE — 700102 HCHG RX REV CODE 250 W/ 637 OVERRIDE(OP): Performed by: NURSE PRACTITIONER

## 2021-02-15 PROCEDURE — 700111 HCHG RX REV CODE 636 W/ 250 OVERRIDE (IP): Performed by: NURSE PRACTITIONER

## 2021-02-15 RX ADMIN — RISPERIDONE 0.5 MG: 0.5 TABLET ORAL at 05:28

## 2021-02-15 RX ADMIN — GABAPENTIN 300 MG: 300 CAPSULE ORAL at 17:32

## 2021-02-15 RX ADMIN — GABAPENTIN 300 MG: 300 CAPSULE ORAL at 05:28

## 2021-02-15 RX ADMIN — DIVALPROEX SODIUM 125 MG: 125 CAPSULE ORAL at 05:28

## 2021-02-15 RX ADMIN — LIDOCAINE 1 PATCH: 50 PATCH CUTANEOUS at 12:49

## 2021-02-15 RX ADMIN — DIVALPROEX SODIUM 125 MG: 125 CAPSULE ORAL at 21:56

## 2021-02-15 RX ADMIN — RISPERIDONE 1 MG: 1 TABLET ORAL at 17:32

## 2021-02-15 RX ADMIN — GABAPENTIN 300 MG: 300 CAPSULE ORAL at 12:49

## 2021-02-15 RX ADMIN — OXYCODONE HYDROCHLORIDE 10 MG: 10 TABLET ORAL at 21:58

## 2021-02-15 RX ADMIN — AMLODIPINE BESYLATE 5 MG: 5 TABLET ORAL at 05:28

## 2021-02-15 RX ADMIN — DIVALPROEX SODIUM 125 MG: 125 CAPSULE ORAL at 12:49

## 2021-02-15 RX ADMIN — OXYCODONE HYDROCHLORIDE 10 MG: 10 TABLET ORAL at 12:49

## 2021-02-15 RX ADMIN — ENOXAPARIN SODIUM 40 MG: 40 INJECTION SUBCUTANEOUS at 05:28

## 2021-02-15 RX ADMIN — OXYCODONE HYDROCHLORIDE 10 MG: 10 TABLET ORAL at 05:28

## 2021-02-15 ASSESSMENT — PAIN DESCRIPTION - PAIN TYPE
TYPE: ACUTE PAIN;SURGICAL PAIN
TYPE: SURGICAL PAIN
TYPE: ACUTE PAIN;SURGICAL PAIN

## 2021-02-15 NOTE — CARE PLAN
Problem: Safety  Goal: Will remain free from injury  Outcome: PROGRESSING AS EXPECTED  Note: Call light in reach, bed in lowest and locked position, necessary belongings in reach. Patient educated on use of call light for assistance. Verbalized understanding.   Problem: Infection  Goal: Will remain free from infection  Outcome: PROGRESSING AS EXPECTED  Note: Patient educated on proper hand hygiene. Verbalized understanding. Healthcare team educated on proper hand hygiene while providing patient care and after patient care.    Problem: Discharge Barriers/Planning  Goal: Patient's continuum of care needs will be met  Outcome: PROGRESSING AS EXPECTED  Note: Case management following  Problem: Pain Management  Goal: Pain level will decrease to patient's comfort goal  Outcome: PROGRESSING AS EXPECTED  Note: Patient medicated for pain as per MAR. Patient encouraged to verbalize pain levels. Patient provided with nonpharmacologic methods of pain management. Verbalized understanding.   Problem: Psychosocial Needs:  Goal: Level of anxiety will decrease  Outcome: PROGRESSING AS EXPECTED

## 2021-02-15 NOTE — WOUND TEAM
[facility]  Inpatient Services  Wound and Skin Care Progress Note    Admission Date: 8/7/2020     Last order of IP CONSULT TO WOUND CARE was found on 9/1/2020 from Hospital Encounter on 8/7/2020     HPI, PMH, SH: Reviewed    Unit where seen by Wound Team: [id number]    WOUND CONSULT/FOLLOW UP RELATED TO:  Left leg scheduled negative pressure wound therapy (npwt) dressing change and 2 layer compression wrap change     OBJECTIVE: NPWT dressing and 2 layer compression wrap in place. Patient on pressure redistribution mattress, up self, ambulates frequently, turns self.    WOUND TYPE, LOCATION, CHARACTERISTICS (Pressure Injuries: location, stage, POA or date identified)      Negative Pressure Wound Therapy 11/20/20 Leg Lateral;Posterior;Medial Left (Active)   NPWT Pump Mode / Pressure Setting Ulta;Continuous;150 mmHg 02/15/21 1400   Dressing Type Medium;Silver impregnated foam 02/15/21 1400   Number of Foam Pieces Used 4 02/15/21 1400   Canister Changed No 02/15/21 1400   Output (mL) 500 mL 02/03/21 1100   NEXT Dressing Change/Treatment Date 02/17/21 02/15/21 1400   WOUND NURSE ONLY - Time Spent with Patient (mins) 60 02/15/21 1400           Wound 11/19/20 Full Thickness Wound Leg Lateral;Posterior;Medial Left (Active)   Wound Image    02/15/21 1400   Site Assessment Red;Yellow 02/15/21 1400   Periwound Assessment Clean;Denuded 02/15/21 1400   Margins Attached edges 02/15/21 1400   Closure Secondary intention 02/15/21 1400   Drainage Amount Moderate 02/15/21 1400   Drainage Description Serosanguineous 02/15/21 1400   Treatments Cleansed;Irrigation;CSWD - Conservative Sharp Wound Debridement;Site care 02/15/21 1400   Wound Cleansing Approved Wound Cleanser 02/15/21 1400   Periwound Protectant Skin Protectant Wipes to Periwound;Hydrocolloid;Drape 02/15/21 1400   Dressing Cleansing/Solutions Not Applicable 02/15/21 1400   Dressing Options Wound Vac;Compression Wrap Two Layer 02/15/21 1400   Dressing Changed  Changed 02/15/21 1400   Dressing Status Clean;Dry;Intact 02/15/21 1400   Dressing Change/Treatment Frequency By Wound Team Only 02/15/21 1400   NEXT Dressing Change/Treatment Date 02/17/21 02/15/21 1400   NEXT Weekly Photo (Inpatient Only) 02/19/21 02/14/21 2000   Non-staged Wound Description Full thickness 02/12/21 1430   Wound Length (cm) 15 cm 02/12/21 1430   Wound Width (cm) 3 cm 02/12/21 1430   Wound Depth (cm) 0.1 cm 02/12/21 1430   Wound Surface Area (cm^2) 45 cm^2 02/12/21 1430   Wound Volume (cm^3) 4.5 cm^3 02/12/21 1430   Post-Procedure Length (cm) 15 cm 02/03/21 1100   Post-Procedure Width (cm) 3.4 cm 02/03/21 1100   Post-Procedure Depth (cm) 0.2 cm 02/03/21 1100   Post-Procedure Surface Area (cm^2) 51 cm^2 02/03/21 1100   Post-Procedure Volume (cm^3) 10.2 cm^3 02/03/21 1100   Wound Healing % 56 02/12/21 1430   Wound Bed Granulation (%) 100 % 02/03/21 1100   Wound Bed Slough (%) 10 % 01/22/21 0912   Wound Bed Granulation (%) - Post-Procedure 100 % 02/03/21 1100   Tunneling (cm) 0 cm 02/10/21 1400   Undermining (cm) 0 cm 02/10/21 1400   Shape irregular 02/15/21 1400   Wound Odor Mild 02/15/21 1400   Pulses Left;2+;DP;PT 02/15/21 1400   Exposed Structures None 02/15/21 1400   WOUND NURSE ONLY - Time Spent with Patient (mins) 60 02/15/21 1400            Lab Values:    Lab Results   Component Value Date/Time    WBC 6.5 11/22/2020 02:44 AM    RBC 3.54 (L) 11/22/2020 02:44 AM    HEMOGLOBIN 9.9 (L) 11/22/2020 02:44 AM    HEMATOCRIT 30.5 (L) 11/22/2020 02:44 AM    CREACTPROT 1.07 (H) 08/12/2020 01:55 PM    SEDRATEWES 85 (H) 08/07/2020 01:20 PM    HBA1C 5.7 (H) 11/09/2018 06:30 PM        Culture Results show:  Recent Results (from the past 720 hour(s))   CULTURE WOUND W/ GRAM STAIN    Collection Time: 09/01/20  2:00 PM    Specimen: Left Leg; Wound   Result Value Ref Range    Significant Indicator POS (POS)     Source WND     Site LEFT LEG     Culture Result - (A)     Gram Stain Result       Moderate Gram positive  cocci.  Moderate Gram positive rods.      Culture Result (A)      Beta Hemolytic Streptococcus group A  Moderate growth      Culture Result (A)      Methicillin Resistant Staphylococcus aureus  Moderate growth      Culture Result (A)      Diphtheroids  Moderate growth  Mixed morphologies.     KOSTAS: 9/20/21   Left.    Doppler waveforms of the common femoral artery are of high amplitude and    triphasic.    Doppler waveforms at the ankle are brisk and triphasic.    Ankle-brachial index is normal.    Unable to obtained popliteal waveforms due to wound bandages.     Self Report / Pain Level: Pt c/o moderate pain at the most proximal posterior thigh. Patient pre-medicated with PO oxycodone about 2 hours prior to dressing change. Liquid lidocaine used while removing dressing and then lidocaine gel to the proximal posterior thigh. Stated no pain at conclusion of dressing change.     INTERVENTIONS BY WOUND TEAM: Wound care performed per standard procedures  Cleansed: Wound cleanser to wound beds and periwound  Debridement - CSWD with curette to remove slough and non viable tissue  Periwound - No sting skin prep and drape to periwound.   Primary - Vanessa to wound beds. Did not use saline to moisten as wounds are already very moist. 3 pieces of silver foam to wound beds, and secured with drape, restarted NPWT 125mmHg, no leaks noted.   Secondary - 2 layer compression wrap foot/leg/thigh    Interdisciplinary consultation: Patient, Bedside RN, Wound care RN Belen    EVALUATION / RATIONALE FOR TREATMENT:     2/12/2021:periwound intact does not appear macerated.  Moderate amount of drainage. Wound bed debrided.   Picture frame draped over periwound, three silver foams used.  Continue NPWT and two layer compression.    2/10/21: Periwound looks less denuded today. Did not place the arglaes powder to wound bed - switching to Vanessa to see if it will stimulate healing in his wound bed. Did not use hydrofiber to periwound but it is in  the room for next visit in case it is needed. Ordered and used liquid lidocaine to remove vac dressing and lidocaine gel to the posterior proximal part of wound. Patient tolerated debridement well with lidocaine.   02/05/21: Addison wound is denuded and wet, vac drape lifting under wrap, Aquacel to dry wet and denuded skin, no silver foam available this dressing change.  02/03/21: Measurements are smaller for both medial and posterior thigh.  Wound bed has granulation and non-viable slough.  Will continue to debride every time with wound VAC changes to decrease bioburden in wound bed.   02/01/21:  No change.  Wounds appear to be improving slowly.      01/29/2021: wounds flush with periwound, fully granulated with scant yellow to a few areas. Medial periwound intact, lateral periwound with some erythema. LPS APRN suggested that next time applying brava strips to any irritated periwound may help reduce irritation.   1/27/21: Restarted NPWT, addison-wound looks better.   1/25/21: denuded tissue, vac vacation for until 1/27.  Re-assessment to place wound VAC to left LE.   1/21/21 area has decreased since last measurements.  Odor still present.  Addison skin compromised probably yeast infection under VAC drape.  Will hold VAC for 72 hours and re assess.  Nystatin to address yeast and dry out addison skin  1/15/21: position of leg may affect posterior thigh measurements.  Medial Leg wound area decreased.    01/10/2021: Wound vac replaced, patient accidentally removed vac and bedside RN was unable to repair.  Patient tolerated wound VAC placement. Patient had moderate ss with some green tinge drainage.   01/04/2021: 2 layer compression wrap re-applied to left LE due to ACE wrap leaving too much indentation to left LE.   12/31/2020 thigh wound much narrower.  Biofilm redeveloping and odor still present.   12/27/20: Wound bed granular and odor has lessen but still persists.   12/18/20 wound length decreased.  Odor persists.     12/14/2020: re-cultured wound bed.  12/11/2020 POD 23 Length of wounds has decreased, width has not changed.  Odor persists.  Pt continues to become angry with VAC when it limits his mobility.    12/7/2020 POD 19 odor persists, improved wound bed after debridement.  Possible bacterial vs yeast vs fungal infection.  Culture taken.  Nystatin to treat possible fungal infection, silver foam to decrease microbial population.    12/4/2020 POD# 16 odor worsening, more yellow tissue present, will add nystatin and silver foam next week and consider a culture  11/30 POD#11 granular buds visible to wound bed.  Same as prior.   11/27 POD#8 wound is odorous likely related to biologic dressing.  No overt signs of infection.  Granular buds are visible through adaptic.  Edges do not appear as thick as they were prior to last sx.  Continue with current treatment.    11/23: POD#4 s/p I&D of left LE wounds and biologic placed by Dr. Olvera on 11/19.  Wound bed is flatter and raised edges scar tissue seems to be gone.   11/14 Posterior thigh aspect of wound deteriorating, will increase frequency of treatment to keep biofilm down and hopefully avoid systemic abx.  Will include thigh in compression wrap.  Will consider culture if improvement is not seen in the next few treatments.    11/10: APRN unavailable at this time.  Will re-schedule excisional debridement to next week.   11/5: Plan for debridement of scarred edges on Tues by LPS. Tendon exposed to posterior aspect of wound, behind knee. Continue with current dressing care.  10/29: Excisional debridement by Asaf ELLER of scar tissue along the proximal edge of the posterior knee and lateral thigh.  Lateral aspect of thigh is flatten.  Medial aspect of knee has thick scar tissue that was excisionally debrided by Asaf ELLER.  Fully granulated throughout the wound bed.   10/23 wound bed mostly granular, superficial in depth, progress has been slow with the wound VAC  so will trial collagen product to encourage new tissue growth.    10/19: wound bed has improved in color and is fully granulated.  Addison-wound has improved, denudation has resolved. APRN continuing with serial weekly debridements as needed.   10/16: wound friable pt now on iv abx per ID.  Indurated edges addressed with drape and foam.  Addison wound likely has yeast infection - addressing with silver powder crusting  10/14: patient seen with Asaf ELLER, stopping veraflo instillation.  Starting silver powder and regular wound VAC to see if it will help with bioburden.  Possible colonization of bacteria.  ID involved.   10/12: Inflammation noted to the proximal part of the wound bed.  Will continue to monitor, possible vac break on Wednesday to help addison-skin inflammation.   10/7: Excisional debridement performed by Asaf ELLER. Will need to continue selective debridement with NPWT changes, and weekly excisional debridement with APRN to flatten out the scar tissue.  IV abx therapy ended 10/5.   10/5: Lateral wound still with some slough, both wounds smaller per measurements. Medial wound with all granular tissue.  9/30: Patient to start abx therapy for 7 days course per Dr. Ellis.  Started dakin's instillation to veraflo  9/28: malodorous today, culture taken.    9/25: Odor noted, though unclear if from wound or pt, consider shower before next Vac change. Consider regular vac next change, wound granular and superficial.   9/23: Patient still awaiting guardianship for placement.   9/18: wound granulating nicely and responding well to current treatment.    9/16: Wound bed with granular tissue, edges are thick but appear better than previous assessments. . NPWT VF to encourage closure by secondary intention and manage drainage while encouraging oxygenation and granulation to the wound bed. 2 layer compression to assist in decrease of swelling and encourage blood flow to wounds. Wound team to follow up and  change 3x/week.      Goals: Steady decrease in wound area and depth weekly.    NURSING PLAN OF CARE ORDERS (X):    Dressing changes: See Dressing Care orders: X  Skin care: See Skin Care orders:   Rectal tube care: See Rectal Tube Care orders:   Other orders:      WOUND TEAM PLAN OF CARE:   Dressing changes by wound team:        Follow up 3 times weekly:                NPWT change 3 times weekly:  X,  NPWT changes 3x/ weekly with 2 layer compression wrap.  Follow up 1-2 times weekly:      Follow up Bi-Monthly:                   Follow up as needed:       Other (explain):     Anticipated discharge plans:  LTACH:        SNF/Rehab: X - patient will need ongoing wound care for LLE upon discharge - 3x/weekly           Home Health Care:           Outpatient Wound Center:            Self Care:

## 2021-02-15 NOTE — PROGRESS NOTES
Received report and assumed pt care.  A&O to self.  Treaded socks on.  Call light and personal belongings within reach.  Bed locked and at lowest position.  GATES.  VSS.  Ambulates ad erika in room and halls.  Wound vac in use.

## 2021-02-15 NOTE — DISCHARGE PLANNING
Anticipated Discharge Disposition: Group Home    Action: Pt is confirmed to have a [facility] Guardian assigned to him.  Placement is now pending pt's guardian to gather financial info to determine how much pt has to afford a placement.    Barriers to Discharge: guardian to gather financial info, placement    Plan: CM to remain available to assist guardian w/ placement, pending financials.

## 2021-02-15 NOTE — PROGRESS NOTES
Report received from Day RN at 1915. Assumed care of patient. Plan of care discussed, questions answered. Assessment completed. VSS, A&O x4, states pain on left lower extremity. PRN med given. Call light in reach. Patient educated on use of call light for assistance.    Up ad erika in room and hallway.  Wound vac in place.

## 2021-02-16 PROCEDURE — 770001 HCHG ROOM/CARE - MED/SURG/GYN PRIV*

## 2021-02-16 PROCEDURE — A9270 NON-COVERED ITEM OR SERVICE: HCPCS | Performed by: NURSE PRACTITIONER

## 2021-02-16 PROCEDURE — 700111 HCHG RX REV CODE 636 W/ 250 OVERRIDE (IP): Performed by: NURSE PRACTITIONER

## 2021-02-16 PROCEDURE — 700102 HCHG RX REV CODE 250 W/ 637 OVERRIDE(OP): Performed by: NURSE PRACTITIONER

## 2021-02-16 RX ADMIN — DIVALPROEX SODIUM 125 MG: 125 CAPSULE ORAL at 22:25

## 2021-02-16 RX ADMIN — OXYCODONE HYDROCHLORIDE 10 MG: 10 TABLET ORAL at 17:36

## 2021-02-16 RX ADMIN — HYDROXYZINE HYDROCHLORIDE 25 MG: 50 TABLET, FILM COATED ORAL at 22:24

## 2021-02-16 RX ADMIN — CYCLOBENZAPRINE 10 MG: 10 TABLET, FILM COATED ORAL at 00:23

## 2021-02-16 RX ADMIN — CYCLOBENZAPRINE 10 MG: 10 TABLET, FILM COATED ORAL at 22:25

## 2021-02-16 RX ADMIN — HYDROXYZINE HYDROCHLORIDE 25 MG: 50 TABLET, FILM COATED ORAL at 00:24

## 2021-02-16 RX ADMIN — GABAPENTIN 300 MG: 300 CAPSULE ORAL at 04:13

## 2021-02-16 RX ADMIN — RISPERIDONE 1 MG: 1 TABLET ORAL at 17:36

## 2021-02-16 RX ADMIN — GABAPENTIN 300 MG: 300 CAPSULE ORAL at 17:36

## 2021-02-16 RX ADMIN — ENOXAPARIN SODIUM 40 MG: 40 INJECTION SUBCUTANEOUS at 04:13

## 2021-02-16 RX ADMIN — DIVALPROEX SODIUM 125 MG: 125 CAPSULE ORAL at 17:36

## 2021-02-16 RX ADMIN — AMLODIPINE BESYLATE 5 MG: 5 TABLET ORAL at 04:13

## 2021-02-16 RX ADMIN — DIVALPROEX SODIUM 125 MG: 125 CAPSULE ORAL at 04:13

## 2021-02-16 RX ADMIN — RISPERIDONE 0.5 MG: 0.5 TABLET ORAL at 04:13

## 2021-02-16 ASSESSMENT — PAIN DESCRIPTION - PAIN TYPE
TYPE: SURGICAL PAIN
TYPE: SURGICAL PAIN

## 2021-02-16 NOTE — PROGRESS NOTES
Wound vac began beeping because of air leak.  Checked and track pad had been pulled off.  RN called wound care.  Per Elizabeth with wound care, RN to remove 2-layer dressing and replace track pad.  Mepilex to be put over dressing over wound that has wound vac attached.  Dressing removed past the knee, and track pad replaced with mepilex over the top per Elizabeth wound RN.  Wound care to come tomorrow, Wednesday 2/17, to redo dressing.

## 2021-02-16 NOTE — PROGRESS NOTES
Received report and assumed pt care.  A&O to self.  Treaded socks on.  Call light and personal belongings within reach.  Bed locked and at lowest position.  GATES.  VSS.  Ambulates ad erika in room and halls.  Wound vac in place.

## 2021-02-17 PROCEDURE — 700102 HCHG RX REV CODE 250 W/ 637 OVERRIDE(OP): Performed by: NURSE PRACTITIONER

## 2021-02-17 PROCEDURE — 700101 HCHG RX REV CODE 250: Performed by: NURSE PRACTITIONER

## 2021-02-17 PROCEDURE — A9270 NON-COVERED ITEM OR SERVICE: HCPCS | Performed by: NURSE PRACTITIONER

## 2021-02-17 PROCEDURE — 700111 HCHG RX REV CODE 636 W/ 250 OVERRIDE (IP): Performed by: NURSE PRACTITIONER

## 2021-02-17 PROCEDURE — 770001 HCHG ROOM/CARE - MED/SURG/GYN PRIV*

## 2021-02-17 PROCEDURE — 97606 NEG PRS WND THER DME>50 SQCM: CPT

## 2021-02-17 RX ADMIN — OXYCODONE HYDROCHLORIDE 10 MG: 10 TABLET ORAL at 17:11

## 2021-02-17 RX ADMIN — GABAPENTIN 300 MG: 300 CAPSULE ORAL at 13:54

## 2021-02-17 RX ADMIN — DIVALPROEX SODIUM 125 MG: 125 CAPSULE ORAL at 23:17

## 2021-02-17 RX ADMIN — OXYCODONE HYDROCHLORIDE 10 MG: 10 TABLET ORAL at 09:13

## 2021-02-17 RX ADMIN — DIVALPROEX SODIUM 125 MG: 125 CAPSULE ORAL at 04:22

## 2021-02-17 RX ADMIN — LIDOCAINE 1 PATCH: 50 PATCH CUTANEOUS at 13:54

## 2021-02-17 RX ADMIN — RISPERIDONE 1 MG: 1 TABLET ORAL at 17:11

## 2021-02-17 RX ADMIN — RISPERIDONE 0.5 MG: 0.5 TABLET ORAL at 04:22

## 2021-02-17 RX ADMIN — GABAPENTIN 300 MG: 300 CAPSULE ORAL at 17:11

## 2021-02-17 RX ADMIN — OXYCODONE HYDROCHLORIDE 10 MG: 10 TABLET ORAL at 23:17

## 2021-02-17 RX ADMIN — GABAPENTIN 300 MG: 300 CAPSULE ORAL at 04:22

## 2021-02-17 RX ADMIN — ENOXAPARIN SODIUM 40 MG: 40 INJECTION SUBCUTANEOUS at 04:22

## 2021-02-17 RX ADMIN — AMLODIPINE BESYLATE 5 MG: 5 TABLET ORAL at 04:22

## 2021-02-17 RX ADMIN — DIVALPROEX SODIUM 125 MG: 125 CAPSULE ORAL at 13:54

## 2021-02-17 RX ADMIN — OXYCODONE HYDROCHLORIDE 10 MG: 10 TABLET ORAL at 01:48

## 2021-02-17 ASSESSMENT — PAIN DESCRIPTION - PAIN TYPE
TYPE: SURGICAL PAIN
TYPE: CHRONIC PAIN;ACUTE PAIN
TYPE: ACUTE PAIN;CHRONIC PAIN

## 2021-02-17 NOTE — WOUND TEAM
[facility]  Inpatient Services  Wound and Skin Care Progress Note    Admission Date: 8/7/2020     Last order of IP CONSULT TO WOUND CARE was found on 9/1/2020 from Hospital Encounter on 8/7/2020     HPI, PMH, SH: Reviewed    Unit where seen by Wound Team: [id number]    WOUND CONSULT/FOLLOW UP RELATED TO:  Left leg scheduled negative pressure wound therapy (npwt) dressing change and 2 layer compression wrap change     OBJECTIVE: NPWT dressing and 2 layer compression wrap in place. Patient on pressure redistribution mattress, up self, ambulates frequently, turns self.    WOUND TYPE, LOCATION, CHARACTERISTICS (Pressure Injuries: location, stage, POA or date identified)      Negative Pressure Wound Therapy 11/20/20 Leg Lateral;Posterior;Medial Left (Active)   NPWT Pump Mode / Pressure Setting Ulta;Continuous;150 mmHg 02/17/21 1100   Dressing Type Silver impregnated foam;Black Foam (Regular) 02/17/21 1100   Number of Foam Pieces Used 5 02/17/21 1100   Canister Changed No 02/17/21 1100   Output (mL) 500 mL 02/03/21 1100   NEXT Dressing Change/Treatment Date 02/17/21 02/16/21 0900   WOUND NURSE ONLY - Time Spent with Patient (mins) 60 02/17/21 1100           Wound 11/19/20 Full Thickness Wound Leg Lateral;Posterior;Medial Left (Active)   Wound Image    02/15/21 1400   Site Assessment Pink;Tan;Yellow;Bleeding;Painful;Slough 02/17/21 1100   Periwound Assessment Pink;Red;Intact 02/17/21 1100   Margins Attached edges 02/17/21 1100   Closure Secondary intention 02/17/21 1100   Drainage Amount Small 02/17/21 1100   Drainage Description Sanguineous 02/17/21 1100   Treatments Cleansed;CSWD - Conservative Sharp Wound Debridement;Site care;Offloading 02/17/21 1100   Wound Cleansing Approved Wound Cleanser 02/17/21 1100   Periwound Protectant Paste Ring;Benzoin 02/17/21 1100   Dressing Cleansing/Solutions Not Applicable 02/17/21 1100   Dressing Options Wound Vac 02/17/21 1100   Dressing Changed Changed 02/17/21 1100    Dressing Status Clean;Dry;Intact 02/17/21 1100   Dressing Change/Treatment Frequency By Wound Team Only 02/17/21 1100   NEXT Dressing Change/Treatment Date 02/19/21 02/17/21 1100   NEXT Weekly Photo (Inpatient Only) 02/19/21 02/16/21 0900   Non-staged Wound Description Full thickness 02/12/21 1430   Wound Length (cm) 15 cm 02/12/21 1430   Wound Width (cm) 3 cm 02/12/21 1430   Wound Depth (cm) 0.1 cm 02/12/21 1430   Wound Surface Area (cm^2) 45 cm^2 02/12/21 1430   Wound Volume (cm^3) 4.5 cm^3 02/12/21 1430   Post-Procedure Length (cm) 15 cm 02/03/21 1100   Post-Procedure Width (cm) 3.4 cm 02/03/21 1100   Post-Procedure Depth (cm) 0.2 cm 02/03/21 1100   Post-Procedure Surface Area (cm^2) 51 cm^2 02/03/21 1100   Post-Procedure Volume (cm^3) 10.2 cm^3 02/03/21 1100   Wound Healing % 56 02/12/21 1430   Wound Bed Granulation (%) 100 % 02/03/21 1100   Wound Bed Slough (%) 10 % 01/22/21 0912   Wound Bed Granulation (%) - Post-Procedure 100 % 02/03/21 1100   Tunneling (cm) 0 cm 02/10/21 1400   Undermining (cm) 0 cm 02/10/21 1400   Shape irregular 02/15/21 1400   Wound Odor Mild 02/15/21 1400   Pulses Left;2+;DP;PT 02/15/21 1400   Exposed Structures None 02/15/21 1400   WOUND NURSE ONLY - Time Spent with Patient (mins) 60 02/17/21 1100            Lab Values:    Lab Results   Component Value Date/Time    WBC 6.5 11/22/2020 02:44 AM    RBC 3.54 (L) 11/22/2020 02:44 AM    HEMOGLOBIN 9.9 (L) 11/22/2020 02:44 AM    HEMATOCRIT 30.5 (L) 11/22/2020 02:44 AM    CREACTPROT 1.07 (H) 08/12/2020 01:55 PM    SEDRATEWES 85 (H) 08/07/2020 01:20 PM    HBA1C 5.7 (H) 11/09/2018 06:30 PM        Culture Results show:  Recent Results (from the past 720 hour(s))   CULTURE WOUND W/ GRAM STAIN    Collection Time: 09/01/20  2:00 PM    Specimen: Left Leg; Wound   Result Value Ref Range    Significant Indicator POS (POS)     Source WND     Site LEFT LEG     Culture Result - (A)     Gram Stain Result       Moderate Gram positive cocci.  Moderate Gram  positive rods.      Culture Result (A)      Beta Hemolytic Streptococcus group A  Moderate growth      Culture Result (A)      Methicillin Resistant Staphylococcus aureus  Moderate growth      Culture Result (A)      Diphtheroids  Moderate growth  Mixed morphologies.     KOSTAS: 9/20/21   Left.    Doppler waveforms of the common femoral artery are of high amplitude and    triphasic.    Doppler waveforms at the ankle are brisk and triphasic.    Ankle-brachial index is normal.    Unable to obtained popliteal waveforms due to wound bandages.     Self Report / Pain Level: Pt complained of intermittent pain, however was able to tolerate dressing change without pharmacological intervention.     INTERVENTIONS BY WOUND TEAM: Wound care performed per standard procedures  Cleansed: Wound cleanser and gauze used to clean wound beds and periwound  Debridement - CSWD with curette to remove slough and non viable tissue by EKATERINA Zamora  Periwound - Benzoin, paste ring   Primary - Vanessa to wound beds, 4 pieces of silver foam to wound beds, 1 black piece used to bridge - all secured with drape, restarted [id number], no leaks noted.   Secondary - 2 layer compression wrap foot/leg/thigh    Interdisciplinary consultation: Patient, Bedside RN, Wound care RN Belen and Sera     EVALUATION / RATIONALE FOR TREATMENT:  2/17/2021: Periwound remains intact. Adherent slough noted over wound bed. Scant bleeding noted on medial wound following CSWD. Continue with Vanessa over wound beds to further stimulate epithelization. Continue with silver foam to manage bioburden.   2/12/2021:periwound intact does not appear macerated.  Moderate amount of drainage. Wound bed debrided.   Picture frame draped over periwound, three silver foams used.  Continue NPWT and two layer compression.    2/10/21: Periwound looks less denuded today. Did not place the arglaes powder to wound bed - switching to Vanessa to see if it will stimulate healing in his wound bed. Did  not use hydrofiber to periwound but it is in the room for next visit in case it is needed. Ordered and used liquid lidocaine to remove vac dressing and lidocaine gel to the posterior proximal part of wound. Patient tolerated debridement well with lidocaine.   02/05/21: Addison wound is denuded and wet, vac drape lifting under wrap, Aquacel to dry wet and denuded skin, no silver foam available this dressing change.  02/03/21: Measurements are smaller for both medial and posterior thigh.  Wound bed has granulation and non-viable slough.  Will continue to debride every time with wound VAC changes to decrease bioburden in wound bed.   02/01/21:  No change.  Wounds appear to be improving slowly.      01/29/2021: wounds flush with periwound, fully granulated with scant yellow to a few areas. Medial periwound intact, lateral periwound with some erythema. LPS APRN suggested that next time applying brava strips to any irritated periwound may help reduce irritation.   1/27/21: Restarted NPWT, addison-wound looks better.   1/25/21: denuded tissue, vac vacation for until 1/27.  Re-assessment to place wound VAC to left LE.   1/21/21 area has decreased since last measurements.  Odor still present.  Addison skin compromised probably yeast infection under VAC drape.  Will hold VAC for 72 hours and re assess.  Nystatin to address yeast and dry out addison skin  1/15/21: position of leg may affect posterior thigh measurements.  Medial Leg wound area decreased.    01/10/2021: Wound vac replaced, patient accidentally removed vac and bedside RN was unable to repair.  Patient tolerated wound VAC placement. Patient had moderate ss with some green tinge drainage.   01/04/2021: 2 layer compression wrap re-applied to left LE due to ACE wrap leaving too much indentation to left LE.   12/31/2020 thigh wound much narrower.  Biofilm redeveloping and odor still present.   12/27/20: Wound bed granular and odor has lessen but still persists.   12/18/20 wound  length decreased.  Odor persists.    12/14/2020: re-cultured wound bed.  12/11/2020 POD 23 Length of wounds has decreased, width has not changed.  Odor persists.  Pt continues to become angry with VAC when it limits his mobility.    12/7/2020 POD 19 odor persists, improved wound bed after debridement.  Possible bacterial vs yeast vs fungal infection.  Culture taken.  Nystatin to treat possible fungal infection, silver foam to decrease microbial population.    12/4/2020 POD# 16 odor worsening, more yellow tissue present, will add nystatin and silver foam next week and consider a culture  11/30 POD#11 granular buds visible to wound bed.  Same as prior.   11/27 POD#8 wound is odorous likely related to biologic dressing.  No overt signs of infection.  Granular buds are visible through adaptic.  Edges do not appear as thick as they were prior to last sx.  Continue with current treatment.    11/23: POD#4 s/p I&D of left LE wounds and biologic placed by Dr. Olvera on 11/19.  Wound bed is flatter and raised edges scar tissue seems to be gone.   11/14 Posterior thigh aspect of wound deteriorating, will increase frequency of treatment to keep biofilm down and hopefully avoid systemic abx.  Will include thigh in compression wrap.  Will consider culture if improvement is not seen in the next few treatments.    11/10: APRN unavailable at this time.  Will re-schedule excisional debridement to next week.   11/5: Plan for debridement of scarred edges on Tues by LPS. Tendon exposed to posterior aspect of wound, behind knee. Continue with current dressing care.  10/29: Excisional debridement by Asaf ELLER of scar tissue along the proximal edge of the posterior knee and lateral thigh.  Lateral aspect of thigh is flatten.  Medial aspect of knee has thick scar tissue that was excisionally debrided by Asaf ELLER.  Fully granulated throughout the wound bed.   10/23 wound bed mostly granular, superficial in depth, progress  has been slow with the wound VAC so will trial collagen product to encourage new tissue growth.    10/19: wound bed has improved in color and is fully granulated.  Addison-wound has improved, denudation has resolved. APRN continuing with serial weekly debridements as needed.   10/16: wound friable pt now on iv abx per ID.  Indurated edges addressed with drape and foam.  Addison wound likely has yeast infection - addressing with silver powder crusting  10/14: patient seen with Asaf ELLER, stopping veraflo instillation.  Starting silver powder and regular wound VAC to see if it will help with bioburden.  Possible colonization of bacteria.  ID involved.   10/12: Inflammation noted to the proximal part of the wound bed.  Will continue to monitor, possible vac break on Wednesday to help addison-skin inflammation.   10/7: Excisional debridement performed by Asaf ELLER. Will need to continue selective debridement with NPWT changes, and weekly excisional debridement with APRN to flatten out the scar tissue.  IV abx therapy ended 10/5.   10/5: Lateral wound still with some slough, both wounds smaller per measurements. Medial wound with all granular tissue.  9/30: Patient to start abx therapy for 7 days course per Dr. Ellis.  Started dakin's instillation to veraflo  9/28: malodorous today, culture taken.    9/25: Odor noted, though unclear if from wound or pt, consider shower before next Vac change. Consider regular vac next change, wound granular and superficial.   9/23: Patient still awaiting guardianship for placement.   9/18: wound granulating nicely and responding well to current treatment.    9/16: Wound bed with granular tissue, edges are thick but appear better than previous assessments. . NPWT VF to encourage closure by secondary intention and manage drainage while encouraging oxygenation and granulation to the wound bed. 2 layer compression to assist in decrease of swelling and encourage blood flow to  wounds. Wound team to follow up and change 3x/week.      Goals: Steady decrease in wound area and depth weekly.    NURSING PLAN OF CARE ORDERS (X):    Dressing changes: See Dressing Care orders: X  Skin care: See Skin Care orders:   Rectal tube care: See Rectal Tube Care orders:   Other orders:      WOUND TEAM PLAN OF CARE:   Dressing changes by wound team:        Follow up 3 times weekly:                NPWT change 3 times weekly:  X,  NPWT changes 3x/ weekly with 2 layer compression wrap.  Follow up 1-2 times weekly:      Follow up Bi-Monthly:                   Follow up as needed:       Other (explain):     Anticipated discharge plans:  LTACH:        SNF/Rehab: X - patient will need ongoing wound care for LLE upon discharge - 3x/weekly           Home Health Care:           Outpatient Wound Center:            Self Care:

## 2021-02-18 PROCEDURE — A9270 NON-COVERED ITEM OR SERVICE: HCPCS | Performed by: NURSE PRACTITIONER

## 2021-02-18 PROCEDURE — 700101 HCHG RX REV CODE 250: Performed by: NURSE PRACTITIONER

## 2021-02-18 PROCEDURE — 700102 HCHG RX REV CODE 250 W/ 637 OVERRIDE(OP): Performed by: NURSE PRACTITIONER

## 2021-02-18 PROCEDURE — 99231 SBSQ HOSP IP/OBS SF/LOW 25: CPT | Performed by: NURSE PRACTITIONER

## 2021-02-18 PROCEDURE — 700111 HCHG RX REV CODE 636 W/ 250 OVERRIDE (IP): Performed by: NURSE PRACTITIONER

## 2021-02-18 PROCEDURE — 770001 HCHG ROOM/CARE - MED/SURG/GYN PRIV*

## 2021-02-18 RX ADMIN — RISPERIDONE 0.5 MG: 0.5 TABLET ORAL at 05:09

## 2021-02-18 RX ADMIN — LIDOCAINE 1 PATCH: 50 PATCH CUTANEOUS at 13:04

## 2021-02-18 RX ADMIN — AMLODIPINE BESYLATE 5 MG: 5 TABLET ORAL at 05:09

## 2021-02-18 RX ADMIN — GABAPENTIN 300 MG: 300 CAPSULE ORAL at 17:47

## 2021-02-18 RX ADMIN — GABAPENTIN 300 MG: 300 CAPSULE ORAL at 05:09

## 2021-02-18 RX ADMIN — OXYCODONE HYDROCHLORIDE 10 MG: 10 TABLET ORAL at 05:09

## 2021-02-18 RX ADMIN — DIVALPROEX SODIUM 125 MG: 125 CAPSULE ORAL at 21:01

## 2021-02-18 RX ADMIN — RISPERIDONE 1 MG: 1 TABLET ORAL at 17:47

## 2021-02-18 RX ADMIN — ENOXAPARIN SODIUM 40 MG: 40 INJECTION SUBCUTANEOUS at 05:09

## 2021-02-18 RX ADMIN — DIVALPROEX SODIUM 125 MG: 125 CAPSULE ORAL at 13:04

## 2021-02-18 RX ADMIN — GABAPENTIN 300 MG: 300 CAPSULE ORAL at 13:04

## 2021-02-18 RX ADMIN — DIVALPROEX SODIUM 125 MG: 125 CAPSULE ORAL at 05:09

## 2021-02-18 RX ADMIN — OXYCODONE HYDROCHLORIDE 10 MG: 10 TABLET ORAL at 21:01

## 2021-02-18 ASSESSMENT — PAIN DESCRIPTION - PAIN TYPE
TYPE: CHRONIC PAIN

## 2021-02-18 NOTE — DISCHARGE PLANNING
Anticipated Discharge Disposition: Group Home    Action: LSW spoke w/ pt's public guardian, Coretta [phone], she is currently working on gathering pt's financial information.  She requested a Zoom mtg w/ pt to introduce herself, Zoom mtg is scheduled for Monday 2/22 at 1400.  Coretta will email Zoom link to this LSW.    Barriers to Discharge: N/A    Plan: As above.

## 2021-02-18 NOTE — PROGRESS NOTES
[facility] TWICE WEEKLY Progress Note    Date of Service  2/18/2021    Chief Complaint  65 y.o. male admitted 8/7/2020 with wound infection    Hospital Course  Mr. Varela is a 65-year-old male with PMH alcohol dependence, tobacco dependence, psychiatric disorder, and HTN who presented to the emergency department 8/7/2020 with a left lower extremity wound infection. He was hospitalized at our facility in April and evaluated by orthopedic surgery who recommended wound care. Wound cultures at that time grew MSSA and Streptococcus. He had been seen at [facility] earlier that week and prescribed antibiotics, however he had not filled the prescription.  An ultrasound of that extremity was done and was negative for DVT.  He was treated for sepsis and completed an antibiotic course on 9/16/2020. He was also found to have head lice and underwent treatment for that.  A repeat wound culture was done on 9/28/2020 and grew Strep A and Proteus. Infectious disease was consulted and recommended a 5-day course of IV zosyn which was completed on 10/5/2020. On 10/12/2020 the wound care team noticed increased inflammation to the proximal part of the wound bed and switched from a vera flow wound VAC to a regular wound VAC.  ID was again consulted and on 10/14/2020 recommended to 7-day course of antibiotics with meropenem which was completed on 10/22/2020. Additionally, he was noted to have intermittent agitation so was started on risperdal, depakote, and gabapentin per psychiatric recommendations.  On 11/20/2020 he underwent left lower extremity debridement with wound VAC placement. Since then he has remained stable.  He has been deemed incapacitated to make medical decisions and guardianship was granted on 1/29/21. Placement will likely be in a group home.       Interval Problem Update  -Patient seen and examined. Patient pacing around the room, but is calm and cooperative. Patient denies any pain or discomfort at this  time and reports pain medication is working for him. Leg wounds improving, wound VAC in place. Patient given an updated in POC.  -POC: continue supportive care; monitor for safety; guardianship established and pending placement to a .   -Lab work: Reviewed; last obtained on 12/12/2020 - unremarkable; we will order labs as warranted.   -VSS at this time    ==========================================================================================  BARBARA ODOM, MSN, APRN, FNP-C, certify that the patient requires continued medically necessary hospital services for the treatment of cognitive impairment and will need long-term placement. The patient will remain in the hospital for the foreseeable future.  Discharge may or may not occur in the next 20 days due to ongoing discharge delays due to no medically acceptable discharge option.  ==========================================================================================    Consultants/Specialty  Wound Team  LPS  Psychiatry  Infectious Disease    Code Status  Full Code    Disposition  -Guardianship established on 1/29/2021.  Pending acceptance and placement, updating financials.    Review of Systems  Review of Systems   Unable to perform ROS: Mental acuity        Physical Exam  Temp:  [35.9 °C (96.7 °F)-36.1 °C (97 °F)] 36.1 °C (97 °F)  Pulse:  [75-80] 80  Resp:  [16-18] 17  BP: (103-143)/(76-83) 119/80  SpO2:  [93 %-98 %] 96 %    Physical Exam  Vitals and nursing note reviewed.   HENT:      Head: Normocephalic.      Nose: Nose normal.      Mouth/Throat:      Mouth: Mucous membranes are moist.   Eyes:      Pupils: Pupils are equal, round, and reactive to light.   Cardiovascular:      Rate and Rhythm: Normal rate and regular rhythm.      Pulses: Normal pulses.      Heart sounds: Normal heart sounds.   Pulmonary:      Effort: Pulmonary effort is normal.      Breath sounds: Normal breath sounds.   Abdominal:      General: Bowel sounds are normal.       Palpations: Abdomen is soft.   Musculoskeletal:         General: Tenderness present. Normal range of motion.      Right lower leg: Edema present.      Left lower leg: Edema present.   Skin:     General: Skin is dry.      Capillary Refill: Capillary refill takes 2 to 3 seconds.   Neurological:      Mental Status: He is alert. Mental status is at baseline.         Fluids    Intake/Output Summary (Last 24 hours) at 2/18/2021 0951  Last data filed at 2/17/2021 2100  Gross per 24 hour   Intake 720 ml   Output --   Net 720 ml       Laboratory                        Imaging  IR-US GUIDED PIV   Final Result    Ultrasound-guided PERIPHERAL IV INSERTION performed by    qualified nursing staff as above.      IR-MIDLINE CATHETER INSERTION WO GUIDANCE > AGE 3   Final Result                  Ultrasound-guided midline placement performed by qualified nursing staff    as above.          IR-US GUIDED PIV   Final Result    Ultrasound-guided PERIPHERAL IV INSERTION performed by    qualified nursing staff as above.      IR-MIDLINE CATHETER INSERTION WO GUIDANCE > AGE 3   Final Result                  Ultrasound-guided midline placement performed by qualified nursing staff    as above.          US-KOSTAS SINGLE LEVEL BILAT   Final Result      CT-HEAD W/O   Final Result      No acute intracranial abnormality      DX-TIBIA AND FIBULA LEFT   Final Result      1.  Healed distal metaphyseal fractures of the left fibula and tibia with tibial IM layla in place.      2.  No acute fracture or dislocation.      3.  No radiopaque soft tissue foreign body.      DX-FEMUR-2+ LEFT   Final Result      1.  No radiographic evidence of acute traumatic injury left femur.      2.  Unchanged cortical thickening in the posterior aspect of the distal femoral diaphysis which may represent sequela of prior injury or infection.      3.  No soft tissue foreign body identified.      US-EXTREMITY VENOUS LOWER UNILAT LEFT   Final Result      DX-CHEST-PORTABLE (1 VIEW)    Final Result      No acute cardiopulmonary abnormality.           Assessment/Plan  * Wound of left leg- (present on admission)  Assessment & Plan  -Wound vac - he intermittently dislodges. Requires ongoing education  -Further management per wound care/LPS/ortho.   -Pain control as needed  -Improving    Encephalopathy- (present on admission)  Assessment & Plan  -Improved   -Per psychiatry and Dr. Velázquez on 1/11: Patient is incapacitated.  -Public Guardian: Coretta Benjamin   -Attempting placement; possibly [facility]    Psychiatric disorder- (present on admission)  Assessment & Plan  -Unspecified.  -Continue Risperdal and Depakote.  -Psychiatry has consulted and deemed him incapacitated to leave AMA or make medical decisions.  -Initial cognitive evaluation 8/20.   -SLP repeated Cognitive eval 1/2021- continue to recommend 24/7 supervision   -Public Guardian: Coretta Benjamin   -Pending [facility]; obtaining financial status    Essential hypertension- (present on admission)  Assessment & Plan  -Well controlled on amlodipine    Emphysema/COPD (HCC)- (present on admission)  Assessment & Plan  -Chronic and stable off medication, without acute exacerbation    Protein malnutrition (HCC)- (present on admission)  Assessment & Plan  -Body mass index is 21.35 kg/m².   -Continue TID supplements.  -Encourage PO intake    Flexion contracture of knee, 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)  Assessment & Plan  -Secondary to chronic wound in that area.  -PT/OT.  -Does not seem to limit his mobility.   - Is frequently ambulatory.    Infection of wound due to methicillin resistant Staphylococcus aureus (MRSA)- (present on admission)  Assessment & Plan  -History of MRSA.  -Poor compliance with OP wound care. Poor compliance with wound vac at times.   -Continue pain control as needed.  -LPS and wound care following - debridements and wound VAC changes per their recommendations  -Cultures repeated 12/14 - positive for Proteus species, pan sensitive.   Completed regimen of augmentin.   -resolved       VTE prophylaxis: Lovenox    ========================================================================================  Please note that this dictation was created using voice recognition software. I have made every reasonable attempt to correct obvious errors, but there may be errors of grammar and possibly content that I did not discover before finalizing the note.    Electronically signed by:  BARBARA Méndez, MSN, APRN, FNP-C  Hospitalist Services  [facility]  [phone]  [email or web address]  02/18/21     1002

## 2021-02-18 NOTE — CARE PLAN
Problem: Safety  Goal: Will remain free from injury  Outcome: PROGRESSING AS EXPECTED  Discussed POC with patient Updated white board Calls appropriately  Call light within reach      Problem: Psychosocial Needs:  Goal: Level of anxiety will decrease  Outcome: PROGRESSING AS EXPECTED  Intermittently pacing and restless, provided redirection and therapeutic listening.

## 2021-02-18 NOTE — PROGRESS NOTES
Problem: Safety  Goal: Will remain free from falls  Outcome: PROGRESSING AS EXPECTED   Safety precautions in place.  Call light and personal items within reach. Bed at lowest position and locked.  Siderails up X 2.  Clutter free environment & adequate lighting. Educated on level of risk and reminded to call for assistance.  Hourly rounding in effect.     Problem: Infection  Goal: Will remain free from infection  Outcome: PROGRESSING AS EXPECTED   Afebrile. Standard precautions in effect.  Hand washing every encounter, and before & after patient care.  Verbalized understanding.     Problem: Knowledge Deficit  Goal: Knowledge of disease process/condition, treatment plan, diagnostic tests, and medications will improve  Outcome: PROGRESSING AS EXPECTED   Discussed plan of care.  Questions answered.  Verbalized understanding.     Problem: Mobility  Goal: Risk for activity intolerance will decrease  Outcome: PROGRESSING AS EXPECTED   Pt tolerating ambulation without complaints.  Ambulated with  feet around the unit.  Educated on ROM exercises, risks and benefits.  Verbalized understanding.

## 2021-02-18 NOTE — CARE PLAN
Problem: Safety  Goal: Will remain free from injury  Outcome: PROGRESSING AS EXPECTED  Note:  Side rails up x2. Shoes and socks on,  Bed in low, locked position, Call light within reach,  Patient does not call despite repeated education attempted.  Patient is up self.       Problem: Pain Management  Goal: Pain level will decrease to patient's comfort goal  Outcome: PROGRESSING AS EXPECTED  Flowsheets  Taken 2/18/2021 1046 by Bakari Segura R.N.  Pain Rating Scale (NPRS): 3  Taken 2/18/2021 0500 by Francisco Reis R.N.  Comfort Goal: Comfort with Movement  Taken 2/18/2021 0100 by Francisco Reis RNIDIA  Non Verbal Scale:   Sleeping   Calm  Note:  Patient's Pain is well controlled with current ordered pain interventions at this time. Will continue to assess for pain and response to ordered pain interventions.

## 2021-02-18 NOTE — DISCHARGE PLANNING
LSW received msg from EKATERINA Acevedo to return call to pt's Public Guardian, Coretta [phone].  LSW called, no answer, left msg.

## 2021-02-19 PROCEDURE — A9270 NON-COVERED ITEM OR SERVICE: HCPCS | Performed by: NURSE PRACTITIONER

## 2021-02-19 PROCEDURE — 700102 HCHG RX REV CODE 250 W/ 637 OVERRIDE(OP): Performed by: NURSE PRACTITIONER

## 2021-02-19 PROCEDURE — 97605 NEG PRS WND THER DME<=50SQCM: CPT

## 2021-02-19 PROCEDURE — 770001 HCHG ROOM/CARE - MED/SURG/GYN PRIV*

## 2021-02-19 PROCEDURE — 700111 HCHG RX REV CODE 636 W/ 250 OVERRIDE (IP): Performed by: NURSE PRACTITIONER

## 2021-02-19 PROCEDURE — 700101 HCHG RX REV CODE 250: Performed by: NURSE PRACTITIONER

## 2021-02-19 RX ADMIN — DIVALPROEX SODIUM 125 MG: 125 CAPSULE ORAL at 13:08

## 2021-02-19 RX ADMIN — AMLODIPINE BESYLATE 5 MG: 5 TABLET ORAL at 04:36

## 2021-02-19 RX ADMIN — DIVALPROEX SODIUM 125 MG: 125 CAPSULE ORAL at 22:10

## 2021-02-19 RX ADMIN — OXYCODONE HYDROCHLORIDE 10 MG: 10 TABLET ORAL at 04:36

## 2021-02-19 RX ADMIN — OXYCODONE HYDROCHLORIDE 10 MG: 10 TABLET ORAL at 11:49

## 2021-02-19 RX ADMIN — DIVALPROEX SODIUM 125 MG: 125 CAPSULE ORAL at 04:36

## 2021-02-19 RX ADMIN — RISPERIDONE 1 MG: 1 TABLET ORAL at 16:45

## 2021-02-19 RX ADMIN — RISPERIDONE 0.5 MG: 0.5 TABLET ORAL at 04:37

## 2021-02-19 RX ADMIN — LIDOCAINE 1 PATCH: 50 PATCH CUTANEOUS at 11:48

## 2021-02-19 RX ADMIN — ENOXAPARIN SODIUM 40 MG: 40 INJECTION SUBCUTANEOUS at 04:37

## 2021-02-19 RX ADMIN — OXYCODONE HYDROCHLORIDE 10 MG: 10 TABLET ORAL at 18:21

## 2021-02-19 RX ADMIN — GABAPENTIN 300 MG: 300 CAPSULE ORAL at 11:48

## 2021-02-19 RX ADMIN — GABAPENTIN 300 MG: 300 CAPSULE ORAL at 16:45

## 2021-02-19 RX ADMIN — GABAPENTIN 300 MG: 300 CAPSULE ORAL at 04:36

## 2021-02-19 ASSESSMENT — PAIN DESCRIPTION - PAIN TYPE
TYPE: CHRONIC PAIN

## 2021-02-19 NOTE — CARE PLAN
Problem: Safety  Goal: Will remain free from injury  Outcome: PROGRESSING AS EXPECTED  Note: Safety precaution in effect. Non skid socks in use. Call light within reach. Reminded patient to call for assist. Hourly rounds in effect.      Problem: Infection  Goal: Will remain free from infection  Outcome: PROGRESSING AS EXPECTED  Note: Implement Standard precaution. Hand washing every encounter & before & after patient care.      Problem: Knowledge Deficit  Goal: Knowledge of disease process/condition, treatment plan, diagnostic tests, and medications will improve  Outcome: PROGRESSING AS EXPECTED  Note: Discussed Plan of care. Questions answered.        Problem: Pain Management  Goal: Pain level will decrease to patient's comfort goal  Outcome: PROGRESSING AS EXPECTED  Note: Educated on pain scale. Encouraged to verbalize pain.  Will medicate as per MAR.      Patient's confused, re oriented.

## 2021-02-19 NOTE — PROGRESS NOTES
0705 Patient's sitting up EOB. Bedside report received from RISA Williamson RN at the beginning of the shift.    0910 Patient's sitting up EOB. Wound vac to LLE, patent. Up self, noted with steady gait. Assessment completed. No distress noted. Noted confusion, re oriented. Plan of care reviewed with the patient.    1149 Medicated with Oxycodone (see MAR) for c/o's left leg pain, rates pain 8/10.     1220 tommie Morelos RN visited. Changed wound vac dressing.    1300 Patient's sitting up EOB, having lunch. No distress noted.     1500 Patient ambulates in his room, noted with steady gait. No distress noted.     1710 Patient's in bed. No distress noted.    1821 Patient's sitting up EOB, having Dinner. Medicated with Oxycodone (see MAR) for c/o's LLE, rates pain 8/10.    1920   Patient's in bed. No change sin status. Bedside report given to Vanessa LORD RN ([place]).

## 2021-02-19 NOTE — WOUND TEAM
[facility]  Inpatient Services  Wound and Skin Care Progress Note    Admission Date: 8/7/2020     Last order of IP CONSULT TO WOUND CARE was found on 9/1/2020 from Hospital Encounter on 8/7/2020     HPI, PMH, SH: Reviewed    Unit where seen by Wound Team: [id number]    WOUND CONSULT/FOLLOW UP RELATED TO:  Left leg scheduled negative pressure wound therapy (npwt) dressing change and 2 layer compression wrap change     OBJECTIVE: NPWT dressing and 2 layer compression wrap in place. Patient on pressure redistribution mattress, up self, ambulates frequently, turns self.    WOUND TYPE, LOCATION, CHARACTERISTICS (Pressure Injuries: location, stage, POA or date identified)         Negative Pressure Wound Therapy 11/20/20 Leg Lateral;Posterior;Medial Left (Active)   NPWT Pump Mode / Pressure Setting Continuous;150 mmHg 02/19/21 1200   Dressing Type Silver impregnated foam 02/19/21 1200   Number of Foam Pieces Used 3 02/19/21 1200   Canister Changed No 02/19/21 1200   Output (mL) 500 mL 02/03/21 1100   NEXT Dressing Change/Treatment Date 02/22/21 02/19/21 1200   WOUND NURSE ONLY - Time Spent with Patient (mins) 75 02/19/21 1200           Wound 11/19/20 Full Thickness Wound Leg Lateral;Posterior;Medial Left (Active)   Wound Image    02/15/21 1400   Site Assessment Yellow;Red;Pink;Pale;Slough 02/19/21 1200   Periwound Assessment Scar tissue;Dry 02/19/21 1200   Margins Attached edges;Defined edges 02/19/21 1200   Closure Secondary intention 02/19/21 1200   Drainage Amount Small 02/19/21 1200   Drainage Description Serosanguineous 02/19/21 1200   Treatments Cleansed;Site care;Tape change 02/19/21 1200   Wound Cleansing Approved Wound Cleanser 02/19/21 1200   Periwound Protectant Paste Ring;Drape;Skin Protectant Wipes to Periwound 02/19/21 1200   Dressing Cleansing/Solutions Not Applicable 02/19/21 1200   Dressing Options Compression Wrap Two Layer;Wound Vac 02/19/21 1200   Dressing Changed Changed 02/19/21 1200    Dressing Status Clean;Dry;Intact 02/19/21 1200   Dressing Change/Treatment Frequency By Wound Team Only 02/19/21 1200   NEXT Dressing Change/Treatment Date 02/22/21 02/19/21 1200   NEXT Weekly Photo (Inpatient Only) 02/22/21 02/19/21 1200   Non-staged Wound Description Full thickness 02/19/21 1200   Exposed Structures None 02/19/21 1200   WOUND NURSE ONLY - Time Spent with Patient (mins) 60 02/17/21 1100               Lab Values:    Lab Results   Component Value Date/Time    WBC 6.5 11/22/2020 02:44 AM    RBC 3.54 (L) 11/22/2020 02:44 AM    HEMOGLOBIN 9.9 (L) 11/22/2020 02:44 AM    HEMATOCRIT 30.5 (L) 11/22/2020 02:44 AM    CREACTPROT 1.07 (H) 08/12/2020 01:55 PM    SEDRATEWES 85 (H) 08/07/2020 01:20 PM    HBA1C 5.7 (H) 11/09/2018 06:30 PM        Culture Results show:  Recent Results (from the past 720 hour(s))   CULTURE WOUND W/ GRAM STAIN    Collection Time: 09/01/20  2:00 PM    Specimen: Left Leg; Wound   Result Value Ref Range    Significant Indicator POS (POS)     Source WND     Site LEFT LEG     Culture Result - (A)     Gram Stain Result       Moderate Gram positive cocci.  Moderate Gram positive rods.      Culture Result (A)      Beta Hemolytic Streptococcus group A  Moderate growth      Culture Result (A)      Methicillin Resistant Staphylococcus aureus  Moderate growth      Culture Result (A)      Diphtheroids  Moderate growth  Mixed morphologies.     KOSTAS: 9/20/21   Left.    Doppler waveforms of the common femoral artery are of high amplitude and    triphasic.    Doppler waveforms at the ankle are brisk and triphasic.    Ankle-brachial index is normal.    Unable to obtained popliteal waveforms due to wound bandages.     Self Report / Pain Level: Pt complained of intermittent pain, however was able to tolerate dressing change without pharmacological intervention.     INTERVENTIONS BY WOUND TEAM: Wound care performed per standard procedures  Cleansed: Wound cleanser and gauze used to clean wound beds and  periwound  Debridement - slough (type of tissue) and non viable tissue debrided away using curette.  <20 cm2 debrided. Minimal bleeding noted, controlled with manual pressure. Pain on superior lateral edge  Periwound - Benzoin, paste ring, drape bridge between the two   Primary - Agata to wound beds, 1 piece of silver foam half thickness cut to fit and placed to each wound bed, 1 black piece used to bridge - all secured with drape, restarted [id number], no leaks noted.   Secondary - 2 layer compression wrap foot/leg/thigh    Interdisciplinary consultation: Patient, Bedside RN, Wound care RN Belen and Sera     EVALUATION / RATIONALE FOR TREATMENT:  2/19/21: minimal to no changes from last assessment. Continue CSWD, agata, and silver foam for bioburden.  2/17/2021: Periwound remains intact. Adherent slough noted over wound bed. Scant bleeding noted on medial wound following CSWD. Continue with Agata over wound beds to further stimulate epithelization. Continue with silver foam to manage bioburden.   2/12/2021:periwound intact does not appear macerated.  Moderate amount of drainage. Wound bed debrided.   Picture frame draped over periwound, three silver foams used.  Continue NPWT and two layer compression.    2/10/21: Periwound looks less denuded today. Did not place the arglaes powder to wound bed - switching to Agata to see if it will stimulate healing in his wound bed. Did not use hydrofiber to periwound but it is in the room for next visit in case it is needed. Ordered and used liquid lidocaine to remove vac dressing and lidocaine gel to the posterior proximal part of wound. Patient tolerated debridement well with lidocaine.   02/05/21: Meredith wound is denuded and wet, vac drape lifting under wrap, Aquacel to dry wet and denuded skin, no silver foam available this dressing change.  02/03/21: Measurements are smaller for both medial and posterior thigh.  Wound bed has granulation and non-viable slough.  Will  continue to debride every time with wound VAC changes to decrease bioburden in wound bed.   02/01/21:  No change.  Wounds appear to be improving slowly.      01/29/2021: wounds flush with periwound, fully granulated with scant yellow to a few areas. Medial periwound intact, lateral periwound with some erythema. LPS APRN suggested that next time applying brava strips to any irritated periwound may help reduce irritation.   1/27/21: Restarted NPWT, addison-wound looks better.   1/25/21: denuded tissue, vac vacation for until 1/27.  Re-assessment to place wound VAC to left LE.   1/21/21 area has decreased since last measurements.  Odor still present.  Addison skin compromised probably yeast infection under VAC drape.  Will hold VAC for 72 hours and re assess.  Nystatin to address yeast and dry out addison skin  1/15/21: position of leg may affect posterior thigh measurements.  Medial Leg wound area decreased.    01/10/2021: Wound vac replaced, patient accidentally removed vac and bedside RN was unable to repair.  Patient tolerated wound VAC placement. Patient had moderate ss with some green tinge drainage.   01/04/2021: 2 layer compression wrap re-applied to left LE due to ACE wrap leaving too much indentation to left LE.   12/31/2020 thigh wound much narrower.  Biofilm redeveloping and odor still present.   12/27/20: Wound bed granular and odor has lessen but still persists.   12/18/20 wound length decreased.  Odor persists.    12/14/2020: re-cultured wound bed.  12/11/2020 POD 23 Length of wounds has decreased, width has not changed.  Odor persists.  Pt continues to become angry with VAC when it limits his mobility.    12/7/2020 POD 19 odor persists, improved wound bed after debridement.  Possible bacterial vs yeast vs fungal infection.  Culture taken.  Nystatin to treat possible fungal infection, silver foam to decrease microbial population.    12/4/2020 POD# 16 odor worsening, more yellow tissue present, will add nystatin  and silver foam next week and consider a culture  11/30 POD#11 granular buds visible to wound bed.  Same as prior.   11/27 POD#8 wound is odorous likely related to biologic dressing.  No overt signs of infection.  Granular buds are visible through adaptic.  Edges do not appear as thick as they were prior to last sx.  Continue with current treatment.    11/23: POD#4 s/p I&D of left LE wounds and biologic placed by Dr. Olvera on 11/19.  Wound bed is flatter and raised edges scar tissue seems to be gone.   11/14 Posterior thigh aspect of wound deteriorating, will increase frequency of treatment to keep biofilm down and hopefully avoid systemic abx.  Will include thigh in compression wrap.  Will consider culture if improvement is not seen in the next few treatments.    11/10: APRN unavailable at this time.  Will re-schedule excisional debridement to next week.   11/5: Plan for debridement of scarred edges on Tues by LPS. Tendon exposed to posterior aspect of wound, behind knee. Continue with current dressing care.  10/29: Excisional debridement by Asaf ELLER of scar tissue along the proximal edge of the posterior knee and lateral thigh.  Lateral aspect of thigh is flatten.  Medial aspect of knee has thick scar tissue that was excisionally debrided by Asaf ELLER.  Fully granulated throughout the wound bed.   10/23 wound bed mostly granular, superficial in depth, progress has been slow with the wound VAC so will trial collagen product to encourage new tissue growth.    10/19: wound bed has improved in color and is fully granulated.  Meredith-wound has improved, denudation has resolved. APRN continuing with serial weekly debridements as needed.   10/16: wound friable pt now on iv abx per ID.  Indurated edges addressed with drape and foam.  Meredith wound likely has yeast infection - addressing with silver powder crusting  10/14: patient seen with Asaf ELLER, stopping veraflo instillation.  Starting silver  powder and regular wound VAC to see if it will help with bioburden.  Possible colonization of bacteria.  ID involved.   10/12: Inflammation noted to the proximal part of the wound bed.  Will continue to monitor, possible vac break on Wednesday to help addison-skin inflammation.   10/7: Excisional debridement performed by Asaf ELLER. Will need to continue selective debridement with NPWT changes, and weekly excisional debridement with APRN to flatten out the scar tissue.  IV abx therapy ended 10/5.   10/5: Lateral wound still with some slough, both wounds smaller per measurements. Medial wound with all granular tissue.  9/30: Patient to start abx therapy for 7 days course per Dr. Ellis.  Started dakin's instillation to veraflo  9/28: malodorous today, culture taken.    9/25: Odor noted, though unclear if from wound or pt, consider shower before next Vac change. Consider regular vac next change, wound granular and superficial.   9/23: Patient still awaiting guardianship for placement.   9/18: wound granulating nicely and responding well to current treatment.    9/16: Wound bed with granular tissue, edges are thick but appear better than previous assessments. . NPWT VF to encourage closure by secondary intention and manage drainage while encouraging oxygenation and granulation to the wound bed. 2 layer compression to assist in decrease of swelling and encourage blood flow to wounds. Wound team to follow up and change 3x/week.      Goals: Steady decrease in wound area and depth weekly.    NURSING PLAN OF CARE ORDERS (X):    Dressing changes: See Dressing Care orders: X  Skin care: See Skin Care orders:   Rectal tube care: See Rectal Tube Care orders:   Other orders:      WOUND TEAM PLAN OF CARE:   Dressing changes by wound team:        Follow up 3 times weekly:                NPWT change 3 times weekly:  X,  NPWT changes 3x/ weekly with 2 layer compression wrap.  Follow up 1-2 times weekly:      Follow up  Bi-Monthly:                   Follow up as needed:       Other (explain):     Anticipated discharge plans:  LTACH:        SNF/Rehab: X - patient will need ongoing wound care for LLE upon discharge - 3x/weekly           Home Health Care:           Outpatient Wound Center:            Self Care:

## 2021-02-19 NOTE — CARE PLAN
Problem: Safety  Goal: Will remain free from injury  2/19/2021 0052 by Francisco Reis R.N.  Outcome: PROGRESSING AS EXPECTED    Problem: Pain Management  Goal: Pain level will decrease to patient's comfort goal  Outcome: PROGRESSING AS EXPECTED

## 2021-02-20 PROCEDURE — A9270 NON-COVERED ITEM OR SERVICE: HCPCS | Performed by: NURSE PRACTITIONER

## 2021-02-20 PROCEDURE — 770001 HCHG ROOM/CARE - MED/SURG/GYN PRIV*

## 2021-02-20 PROCEDURE — 700102 HCHG RX REV CODE 250 W/ 637 OVERRIDE(OP): Performed by: NURSE PRACTITIONER

## 2021-02-20 PROCEDURE — 700101 HCHG RX REV CODE 250: Performed by: NURSE PRACTITIONER

## 2021-02-20 PROCEDURE — 99231 SBSQ HOSP IP/OBS SF/LOW 25: CPT | Performed by: NURSE PRACTITIONER

## 2021-02-20 PROCEDURE — 700111 HCHG RX REV CODE 636 W/ 250 OVERRIDE (IP): Performed by: NURSE PRACTITIONER

## 2021-02-20 RX ADMIN — GABAPENTIN 300 MG: 300 CAPSULE ORAL at 05:27

## 2021-02-20 RX ADMIN — DIVALPROEX SODIUM 125 MG: 125 CAPSULE ORAL at 12:45

## 2021-02-20 RX ADMIN — OXYCODONE HYDROCHLORIDE 10 MG: 10 TABLET ORAL at 00:42

## 2021-02-20 RX ADMIN — RISPERIDONE 0.5 MG: 0.5 TABLET ORAL at 05:27

## 2021-02-20 RX ADMIN — GABAPENTIN 300 MG: 300 CAPSULE ORAL at 11:21

## 2021-02-20 RX ADMIN — ENOXAPARIN SODIUM 40 MG: 40 INJECTION SUBCUTANEOUS at 05:27

## 2021-02-20 RX ADMIN — OXYCODONE HYDROCHLORIDE 10 MG: 10 TABLET ORAL at 18:13

## 2021-02-20 RX ADMIN — DIVALPROEX SODIUM 125 MG: 125 CAPSULE ORAL at 05:27

## 2021-02-20 RX ADMIN — GABAPENTIN 300 MG: 300 CAPSULE ORAL at 16:01

## 2021-02-20 RX ADMIN — LIDOCAINE 1 PATCH: 50 PATCH CUTANEOUS at 11:21

## 2021-02-20 RX ADMIN — OXYCODONE HYDROCHLORIDE 10 MG: 10 TABLET ORAL at 08:14

## 2021-02-20 RX ADMIN — RISPERIDONE 1 MG: 1 TABLET ORAL at 16:01

## 2021-02-20 ASSESSMENT — PAIN DESCRIPTION - PAIN TYPE
TYPE: CHRONIC PAIN

## 2021-02-20 NOTE — PROGRESS NOTES
0655 Patient's sitting up EOB. Bedside report received from RISA Callahan RN at the beginning of the shift.     0814 Patient's sitting up EOB. Wound vac to LLE, patent. Up self, noted with steady gait. Medicated with Oxycodone (see MAR) for c/o's LLE pain, rates pain 10/10. Assessment completed. No distress noted. Noted confusion, re oriented. Plan of care reviewed with the patient.     0930 Patient ambulates in his room, noted with steady gait. No distress noted.     1120 Patient's sitting up in bed. No distress noted.     1315 Patient's sitting up EOB, having lunch. No distress noted.     1525 Patient's in bed. No distress noted.    1735 Patient's in bed. No distress noted.    1813 Patient's sitting EOB, having Dinner. Medicated with Oxycodone (see MAR) for c/o's left leg pain, rates pain 10/10.    1910  Patient's in bed. No changes in status. Bedside report given to Oncmaribell LORD RN ([place]).

## 2021-02-20 NOTE — PROGRESS NOTES
Report received from Day RN at 1915. Assumed care of patient. Plan of care discussed, questions answered. Assessment completed. VSS, A&O x2, states pain posterior LLE. PRN med given. Call light in reach. Patient educated on use of call light for assistance.    Wound vac in place. Canister changed.

## 2021-02-20 NOTE — CARE PLAN
Problem: Safety  Goal: Will remain free from injury  Outcome: PROGRESSING AS EXPECTED  Note: Call light in reach, bed in lowest and locked position, necessary belongings in reach. Patient educated on use of call light for assistance. Verbalized understanding.   Problem: Pain Management  Goal: Pain level will decrease to patient's comfort goal  Outcome: PROGRESSING AS EXPECTED  Note: Patient medicated for pain as per MAR. Patient encouraged to verbalize pain levels. Patient provided with nonpharmacologic methods of pain management. Verbalized understanding.

## 2021-02-20 NOTE — PROGRESS NOTES
[facility] Medicine TWICE WEEKLY Progress Note    Date of Service  2/20/2021    Chief Complaint  65 y.o. male admitted 8/7/2020 with wound infection    Hospital Course  Mr. Varela is a 65-year-old male with PMH alcohol dependence, tobacco dependence, psychiatric disorder, and HTN who presented to the emergency department 8/7/2020 with a left lower extremity wound infection. He was hospitalized at our facility in April and evaluated by orthopedic surgery who recommended wound care. Wound cultures at that time grew MSSA and Streptococcus. He had been seen at [facility] earlier that week and prescribed antibiotics, however he had not filled the prescription.  An ultrasound of that extremity was done and was negative for DVT.  He was treated for sepsis and completed an antibiotic course on 9/16/2020. He was also found to have head lice and underwent treatment for that.  A repeat wound culture was done on 9/28/2020 and grew Strep A and Proteus. Infectious disease was consulted and recommended a 5-day course of IV zosyn which was completed on 10/5/2020. On 10/12/2020 the wound care team noticed increased inflammation to the proximal part of the wound bed and switched from a vera flow wound VAC to a regular wound VAC.  ID was again consulted and on 10/14/2020 recommended to 7-day course of antibiotics with meropenem which was completed on 10/22/2020. Additionally, he was noted to have intermittent agitation so was started on risperdal, depakote, and gabapentin per psychiatric recommendations.  On 11/20/2020 he underwent left lower extremity debridement with wound VAC placement. Since then he has remained stable.  He has been deemed incapacitated to make medical decisions and guardianship was granted on 1/29/21. Placement will likely be in a group home.       Interval Problem Update  2/17/2021  -Patient seen and examined. Patient pacing around the room, but is calm and cooperative. Patient denies any pain or  discomfort at this time and reports pain medication is working for him. Leg wounds improving, wound VAC in place. Patient given an updated in POC.  -POC: continue supportive care; monitor for safety; guardianship established and pending placement to a .   -Lab work: Reviewed; last obtained on 12/12/2020 - unremarkable; we will order labs as warranted.   -VSS at this time      2/20/2021  -Patient seen and examined.  Patient seen in regular street clothes and sitting in bed.  Wound VAC on left leg in place and intact.  Discussed with patient option in regards to discharge along with wound clinic monitoring.  Patient reports he has no means of financially on caring for himself at this time as he is homeless.  Patient given an update in plan of care.  -POC: Continue supportive care; monitor for safety; guardianship has been spent stylish and pending placement to a .  -Lab work: Reviewed; last obtained on 12/12/2020 -unremarkable; we will order labs as warranted.  -VSS at this time  -There are no significant changes from previous ROS/PE, please see my previous note for further details.    ==========================================================================================  IBARBARA, MSN, APRN, FNP-C, certify that the patient requires continued medically necessary hospital services for the treatment of cognitive impairment and will need long-term placement. The patient will remain in the hospital for the foreseeable future.  Discharge may or may not occur in the next 20 days due to ongoing discharge delays due to no medically acceptable discharge option.  ==========================================================================================    Consultants/Specialty  Wound Team  LPS  Psychiatry  Infectious Disease    Code Status  Full Code    Disposition  -Guardianship established on 1/29/2021.  Pending acceptance and placement, updating financials.    Review of Systems  Review of Systems    Unable to perform ROS: Mental acuity        Physical Exam  Temp:  [36.1 °C (97 °F)-37.1 °C (98.8 °F)] 36.1 °C (97 °F)  Pulse:  [67-75] 67  Resp:  [16-18] 16  BP: (124-126)/(77-81) 124/77  SpO2:  [95 %-98 %] 95 %    Physical Exam  Vitals and nursing note reviewed.   HENT:      Head: Normocephalic.      Nose: Nose normal.      Mouth/Throat:      Mouth: Mucous membranes are moist.   Eyes:      Pupils: Pupils are equal, round, and reactive to light.   Cardiovascular:      Rate and Rhythm: Normal rate and regular rhythm.      Pulses: Normal pulses.      Heart sounds: Normal heart sounds.   Pulmonary:      Effort: Pulmonary effort is normal.      Breath sounds: Normal breath sounds.   Abdominal:      General: Bowel sounds are normal.      Palpations: Abdomen is soft.   Musculoskeletal:         General: Tenderness present. Normal range of motion.      Right lower leg: Edema present.      Left lower leg: Edema present.   Skin:     General: Skin is dry.      Capillary Refill: Capillary refill takes 2 to 3 seconds.   Neurological:      Mental Status: He is alert. Mental status is at baseline.         Fluids    Intake/Output Summary (Last 24 hours) at 2/20/2021 1228  Last data filed at 2/20/2021 0900  Gross per 24 hour   Intake 360 ml   Output --   Net 360 ml       Laboratory                        Imaging  IR-US GUIDED PIV   Final Result    Ultrasound-guided PERIPHERAL IV INSERTION performed by    qualified nursing staff as above.      IR-MIDLINE CATHETER INSERTION WO GUIDANCE > AGE 3   Final Result                  Ultrasound-guided midline placement performed by qualified nursing staff    as above.          IR-US GUIDED PIV   Final Result    Ultrasound-guided PERIPHERAL IV INSERTION performed by    qualified nursing staff as above.      IR-MIDLINE CATHETER INSERTION WO GUIDANCE > AGE 3   Final Result                  Ultrasound-guided midline placement performed by qualified nursing staff    as above.          US-KOSTAS  SINGLE LEVEL BILAT   Final Result      CT-HEAD W/O   Final Result      No acute intracranial abnormality      DX-TIBIA AND FIBULA LEFT   Final Result      1.  Healed distal metaphyseal fractures of the left fibula and tibia with tibial IM layla in place.      2.  No acute fracture or dislocation.      3.  No radiopaque soft tissue foreign body.      DX-FEMUR-2+ LEFT   Final Result      1.  No radiographic evidence of acute traumatic injury left femur.      2.  Unchanged cortical thickening in the posterior aspect of the distal femoral diaphysis which may represent sequela of prior injury or infection.      3.  No soft tissue foreign body identified.      US-EXTREMITY VENOUS LOWER UNILAT LEFT   Final Result      DX-CHEST-PORTABLE (1 VIEW)   Final Result      No acute cardiopulmonary abnormality.           Assessment/Plan  * Wound of left leg- (present on admission)  Assessment & Plan  -Wound vac - he intermittently dislodges. Requires ongoing education  -Further management per wound care/LPS/ortho.   -Pain control as needed  -Improving    Encephalopathy- (present on admission)  Assessment & Plan  -Improved   -Per psychiatry and Dr. Velázquez on 1/11: Patient is incapacitated.  -Public Guardian: Coretta   -Attempting placement; possibly [facility]    Psychiatric disorder- (present on admission)  Assessment & Plan  -Unspecified.  -Continue Risperdal and Depakote.  -Psychiatry has consulted and deemed him incapacitated to leave [place] or make medical decisions.  -Initial cognitive evaluation 8/20.   -SLP repeated Cognitive eval 1/2021- continue to recommend 24/7 supervision   -Public Guardian: Coretta Jenkins   -Pending [facility]; obtaining financial status    Essential hypertension- (present on admission)  Assessment & Plan  -Well controlled on amlodipine    Emphysema/COPD (HCC)- (present on admission)  Assessment & Plan  -Chronic and stable off medication, without acute exacerbation    Protein malnutrition (HCC)- (present on  admission)  Assessment & Plan  -Body mass index is 21.35 kg/m².   -Continue TID supplements.  -Encourage PO intake    Flexion contracture of knee, 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)  Assessment & Plan  -Secondary to chronic wound in that area.  -PT/OT.  -Does not seem to limit his mobility.   - Is frequently ambulatory.    Infection of wound due to methicillin resistant Staphylococcus aureus (MRSA)- (present on admission)  Assessment & Plan  -History of MRSA.  -Poor compliance with OP wound care. Poor compliance with wound vac at times.   -Continue pain control as needed.  -LPS and wound care following - debridements and wound VAC changes per their recommendations  -Cultures repeated 12/14 - positive for Proteus species, pan sensitive.  Completed regimen of augmentin.   -resolved       VTE prophylaxis: Lovenox    ========================================================================================  Please note that this dictation was created using voice recognition software. I have made every reasonable attempt to correct obvious errors, but there may be errors of grammar and possibly content that I did not discover before finalizing the note.    Electronically signed by:  BARBARA Méndez, MSN, APRN, FNP-C  Hospitalist Services  [facility]  [phone]  [email or web address]  02/20/21     [id number]

## 2021-02-21 ENCOUNTER — APPOINTMENT (OUTPATIENT)
Dept: RADIOLOGY | Facility: MEDICAL CENTER | Age: 66
DRG: 853 | End: 2021-02-21
Attending: NURSE PRACTITIONER
Payer: MEDICARE

## 2021-02-21 PROCEDURE — 700102 HCHG RX REV CODE 250 W/ 637 OVERRIDE(OP): Performed by: NURSE PRACTITIONER

## 2021-02-21 PROCEDURE — A9270 NON-COVERED ITEM OR SERVICE: HCPCS | Performed by: NURSE PRACTITIONER

## 2021-02-21 PROCEDURE — 71045 X-RAY EXAM CHEST 1 VIEW: CPT

## 2021-02-21 PROCEDURE — 700111 HCHG RX REV CODE 636 W/ 250 OVERRIDE (IP): Performed by: NURSE PRACTITIONER

## 2021-02-21 PROCEDURE — 700101 HCHG RX REV CODE 250: Performed by: NURSE PRACTITIONER

## 2021-02-21 PROCEDURE — 770001 HCHG ROOM/CARE - MED/SURG/GYN PRIV*

## 2021-02-21 RX ORDER — GUAIFENESIN/DEXTROMETHORPHAN 100-10MG/5
5 SYRUP ORAL EVERY 6 HOURS PRN
Status: DISCONTINUED | OUTPATIENT
Start: 2021-02-21 | End: 2021-04-01

## 2021-02-21 RX ADMIN — DIVALPROEX SODIUM 125 MG: 125 CAPSULE ORAL at 14:06

## 2021-02-21 RX ADMIN — GUAIFENESIN AND DEXTROMETHORPHAN 5 ML: 100; 10 SYRUP ORAL at 11:11

## 2021-02-21 RX ADMIN — RISPERIDONE 0.5 MG: 0.5 TABLET ORAL at 05:35

## 2021-02-21 RX ADMIN — OXYCODONE HYDROCHLORIDE 10 MG: 10 TABLET ORAL at 22:47

## 2021-02-21 RX ADMIN — DIVALPROEX SODIUM 125 MG: 125 CAPSULE ORAL at 05:35

## 2021-02-21 RX ADMIN — AMLODIPINE BESYLATE 5 MG: 5 TABLET ORAL at 05:35

## 2021-02-21 RX ADMIN — GABAPENTIN 300 MG: 300 CAPSULE ORAL at 16:40

## 2021-02-21 RX ADMIN — LIDOCAINE 1 PATCH: 50 PATCH CUTANEOUS at 11:11

## 2021-02-21 RX ADMIN — RISPERIDONE 1 MG: 1 TABLET ORAL at 16:40

## 2021-02-21 RX ADMIN — DIVALPROEX SODIUM 125 MG: 125 CAPSULE ORAL at 22:47

## 2021-02-21 RX ADMIN — GABAPENTIN 300 MG: 300 CAPSULE ORAL at 11:11

## 2021-02-21 RX ADMIN — ENOXAPARIN SODIUM 40 MG: 40 INJECTION SUBCUTANEOUS at 05:34

## 2021-02-21 RX ADMIN — OXYCODONE HYDROCHLORIDE 10 MG: 10 TABLET ORAL at 09:22

## 2021-02-21 RX ADMIN — OXYCODONE HYDROCHLORIDE 10 MG: 10 TABLET ORAL at 15:56

## 2021-02-21 RX ADMIN — GABAPENTIN 300 MG: 300 CAPSULE ORAL at 05:35

## 2021-02-21 ASSESSMENT — PAIN DESCRIPTION - PAIN TYPE
TYPE: CHRONIC PAIN

## 2021-02-21 NOTE — PROGRESS NOTES
Pt has productive cough. Lung sounds clear/diminished. Notified Lele ELLER. RAFITA to order cough medicine and chest Xray

## 2021-02-21 NOTE — PROGRESS NOTES
Report received from Day RN at 1915. Assumed care of patient. Plan of care discussed, questions answered. Assessment completed. VSS, A&O x2, states pain on LLE. PRN med given. Call light in reach. Patient educated on use of call light for assistance.    Wound vac in place.

## 2021-02-21 NOTE — PROGRESS NOTES
"Pt off the floor to [facility] garden with Leonarda - care aid via walking. Pt refused the wheelchair stating \"I don't have any broken leg\".   "

## 2021-02-21 NOTE — CARE PLAN
Problem: Infection  Goal: Will remain free from infection  Outcome: PROGRESSING AS EXPECTED  Note: Patient educated on proper hand hygiene. Verbalized understanding. Healthcare team educated on proper hand hygiene while providing patient care and after patient care.    Problem: Knowledge Deficit  Goal: Knowledge of disease process/condition, treatment plan, diagnostic tests, and medications will improve  Outcome: PROGRESSING AS EXPECTED  Note: Plan of care discussed with patient, questions answered. Patient encouraged to verbalize feelings and concerns. Verbalized understanding.   Problem: Pain Management  Goal: Pain level will decrease to patient's comfort goal  Outcome: PROGRESSING AS EXPECTED  Note: Patient medicated for pain as per MAR. Patient encouraged to verbalize pain levels. Patient provided with nonpharmacologic methods of pain management. Verbalized understanding.

## 2021-02-21 NOTE — CARE PLAN
Problem: Safety  Goal: Will remain free from falls  Description: Pt mobilizes frequently independently.   Outcome: PROGRESSING AS EXPECTED  Note: Safety precautions in place. Call light within reach. Bed is low and in locked position. Hourly rounding in place.       Problem: Discharge Barriers/Planning  Goal: Patient's continuum of care needs will be met  Outcome: PROGRESSING AS EXPECTED  Intervention: Assess potential discharge barriers on admission and throughout hospital stay  Note: Pending Zoom meeting of the guardian with  on Monday (02/22/2020)     Problem: Pain Management  Goal: Pain level will decrease to patient's comfort goal  Outcome: PROGRESSING AS EXPECTED  Note: Medicated for pain with Oxycodone 10 mg

## 2021-02-22 PROCEDURE — 700102 HCHG RX REV CODE 250 W/ 637 OVERRIDE(OP): Performed by: NURSE PRACTITIONER

## 2021-02-22 PROCEDURE — 700111 HCHG RX REV CODE 636 W/ 250 OVERRIDE (IP): Performed by: NURSE PRACTITIONER

## 2021-02-22 PROCEDURE — A9270 NON-COVERED ITEM OR SERVICE: HCPCS | Performed by: NURSE PRACTITIONER

## 2021-02-22 PROCEDURE — 770001 HCHG ROOM/CARE - MED/SURG/GYN PRIV*

## 2021-02-22 PROCEDURE — 97605 NEG PRS WND THER DME<=50SQCM: CPT

## 2021-02-22 PROCEDURE — 700101 HCHG RX REV CODE 250: Performed by: NURSE PRACTITIONER

## 2021-02-22 RX ADMIN — DIVALPROEX SODIUM 125 MG: 125 CAPSULE ORAL at 22:06

## 2021-02-22 RX ADMIN — DIVALPROEX SODIUM 125 MG: 125 CAPSULE ORAL at 05:13

## 2021-02-22 RX ADMIN — GABAPENTIN 300 MG: 300 CAPSULE ORAL at 17:00

## 2021-02-22 RX ADMIN — OXYCODONE HYDROCHLORIDE 10 MG: 10 TABLET ORAL at 22:06

## 2021-02-22 RX ADMIN — OXYCODONE HYDROCHLORIDE 10 MG: 10 TABLET ORAL at 08:30

## 2021-02-22 RX ADMIN — GABAPENTIN 300 MG: 300 CAPSULE ORAL at 13:22

## 2021-02-22 RX ADMIN — AMLODIPINE BESYLATE 5 MG: 5 TABLET ORAL at 05:13

## 2021-02-22 RX ADMIN — DIVALPROEX SODIUM 125 MG: 125 CAPSULE ORAL at 13:22

## 2021-02-22 RX ADMIN — RISPERIDONE 0.5 MG: 0.5 TABLET ORAL at 05:14

## 2021-02-22 RX ADMIN — GABAPENTIN 300 MG: 300 CAPSULE ORAL at 05:13

## 2021-02-22 RX ADMIN — RISPERIDONE 1 MG: 1 TABLET ORAL at 17:00

## 2021-02-22 RX ADMIN — LIDOCAINE 1 PATCH: 50 PATCH CUTANEOUS at 13:22

## 2021-02-22 RX ADMIN — ENOXAPARIN SODIUM 40 MG: 40 INJECTION SUBCUTANEOUS at 05:14

## 2021-02-22 RX ADMIN — OXYCODONE HYDROCHLORIDE 10 MG: 10 TABLET ORAL at 17:00

## 2021-02-22 ASSESSMENT — PAIN DESCRIPTION - PAIN TYPE
TYPE: ACUTE PAIN
TYPE: ACUTE PAIN
TYPE: SURGICAL PAIN
TYPE: CHRONIC PAIN

## 2021-02-22 NOTE — WOUND TEAM
[facility]  Inpatient Services  Wound and Skin Care Progress Note    Admission Date: 8/7/2020     Last order of IP CONSULT TO WOUND CARE was found on 9/1/2020 from Hospital Encounter on 8/7/2020     HPI, PMH, SH: Reviewed    Unit where seen by Wound Team: [id number]    WOUND CONSULT/FOLLOW UP RELATED TO:  Left leg scheduled negative pressure wound therapy (npwt) dressing change and 2 layer compression wrap change     OBJECTIVE: NPWT dressing and 2 layer compression wrap in place. Patient on pressure redistribution mattress, up self, ambulates frequently, turns self.    WOUND TYPE, LOCATION, CHARACTERISTICS (Pressure Injuries: location, stage, POA or date identified)        Negative Pressure Wound Therapy 11/20/20 Leg Lateral;Posterior;Medial Left (Active)   NPWT Pump Mode / Pressure Setting Ulta;Continuous;150 mmHg 02/22/21 0830   Dressing Type Silver impregnated foam 02/22/21 0830   Number of Foam Pieces Used 4 02/22/21 0830   Canister Changed No 02/22/21 0830   Output (mL) 20 mL 02/22/21 0715   NEXT Dressing Change/Treatment Date 02/22/21 02/19/21 1200   WOUND NURSE ONLY - Time Spent with Patient (mins) 45 02/22/21 0830           Wound 11/19/20 Full Thickness Wound Leg Lateral;Posterior;Medial Left (Active)   Wound Image    02/22/21 0830   Site Assessment Pink;White;Yellow;Bleeding;Slough;Painful 02/22/21 0830   Periwound Assessment Pink;Fragile;Scar tissue 02/22/21 0830   Margins Attached edges;Defined edges 02/22/21 0830   Closure Secondary intention 02/22/21 0830   Drainage Amount Small 02/22/21 0830   Drainage Description Serosanguineous 02/22/21 0830   Treatments Cleansed;Compression;Site care;CSWD - Conservative Sharp Wound Debridement 02/22/21 0830   Wound Cleansing Approved Wound Cleanser 02/22/21 0830   Periwound Protectant Hydrocolloid 02/22/21 0830   Dressing Cleansing/Solutions Not Applicable 02/22/21 0830   Dressing Options Wound Vac 02/22/21 0830   Dressing Changed Changed 02/22/21 0830    Dressing Status Clean;Dry;Intact 02/22/21 0830   Dressing Change/Treatment Frequency By Wound Team Only 02/22/21 0830   NEXT Dressing Change/Treatment Date 02/24/21 02/22/21 0830   NEXT Weekly Photo (Inpatient Only) 02/22/21 02/19/21 1200   Non-staged Wound Description Full thickness 02/22/21 0830   Wound Length (cm) 12.6 cm 02/22/21 0830   Wound Width (cm) 3 cm 02/22/21 0830   Wound Depth (cm) 0.3 cm 02/22/21 0830   Wound Surface Area (cm^2) 37.8 cm^2 02/22/21 0830   Wound Volume (cm^3) 11.34 cm^3 02/22/21 0830   Post-Procedure Length (cm) 15 cm 02/03/21 1100   Post-Procedure Width (cm) 3.4 cm 02/03/21 1100   Post-Procedure Depth (cm) 0.2 cm 02/03/21 1100   Post-Procedure Surface Area (cm^2) 51 cm^2 02/03/21 1100   Post-Procedure Volume (cm^3) 10.2 cm^3 02/03/21 1100   Wound Healing % -11 02/22/21 0830   Wound Bed Granulation (%) 100 % 02/03/21 1100   Wound Bed Slough (%) 10 % 01/22/21 0912   Wound Bed Granulation (%) - Post-Procedure 100 % 02/03/21 1100   Tunneling (cm) 0 cm 02/10/21 1400   Undermining (cm) 0 cm 02/10/21 1400   Shape irregular 02/22/21 0830   Wound Odor Mild 02/22/21 0830   Pulses Left;2+;DP;PT 02/15/21 1400   Exposed Structures None 02/22/21 0830   WOUND NURSE ONLY - Time Spent with Patient (mins) 60 02/17/21 1100            Lab Values:    Lab Results   Component Value Date/Time    WBC 6.5 11/22/2020 02:44 AM    RBC 3.54 (L) 11/22/2020 02:44 AM    HEMOGLOBIN 9.9 (L) 11/22/2020 02:44 AM    HEMATOCRIT 30.5 (L) 11/22/2020 02:44 AM    CREACTPROT 1.07 (H) 08/12/2020 01:55 PM    SEDRATEWES 85 (H) 08/07/2020 01:20 PM    HBA1C 5.7 (H) 11/09/2018 06:30 PM        Culture Results show:  Recent Results (from the past 720 hour(s))   CULTURE WOUND W/ GRAM STAIN    Collection Time: 09/01/20  2:00 PM    Specimen: Left Leg; Wound   Result Value Ref Range    Significant Indicator POS (POS)     Source WND     Site LEFT LEG     Culture Result - (A)     Gram Stain Result       Moderate Gram positive  cocci.  Moderate Gram positive rods.      Culture Result (A)      Beta Hemolytic Streptococcus group A  Moderate growth      Culture Result (A)      Methicillin Resistant Staphylococcus aureus  Moderate growth      Culture Result (A)      Diphtheroids  Moderate growth  Mixed morphologies.     KOSTAS: 9/20/21   Left.    Doppler waveforms of the common femoral artery are of high amplitude and    triphasic.    Doppler waveforms at the ankle are brisk and triphasic.    Ankle-brachial index is normal.    Unable to obtained popliteal waveforms due to wound bandages.     Self Report / Pain Level: Medicated with oxycodone PRN    INTERVENTIONS BY WOUND TEAM: Wound care performed per standard procedures  Cleansed: Wound cleanser and gauze used to clean wound beds and periwound  Debridement - slough and non viable tissue debrided away using curette by Sera, wound care RN. Pain on superior lateral edge  Periwound - Benzoin, hydrocolloid, drape bridge between the two   Primary - Agata to wound beds, 2 piece of silver foam half thickness cut to fit and placed to each wound bed, 1 silver piece used to bridge - all secured with drape, restarted NPWT 150mmHg, no leaks noted.   Secondary - 2 layer compression wrap foot/leg/thigh    Interdisciplinary consultation: Patient, Bedside RN (Jordin), Wound care RN Belen and Sera     EVALUATION / RATIONALE FOR TREATMENT:  2/22/2021: Rolled edges starting to form along posterior thigh wound bed - may require excisional debridement by provider. Minimal changes to actual wound bed. Continue CSWD, agata, and silver foam to manage bioburden.   2/19/21: minimal to no changes from last assessment. Continue CSWD, agata, and silver foam for bioburden.  2/17/2021: Periwound remains intact. Adherent slough noted over wound bed. Scant bleeding noted on medial wound following CSWD. Continue with Agata over wound beds to further stimulate epithelization. Continue with silver foam to manage bioburden.    2/12/2021:periwound intact does not appear macerated.  Moderate amount of drainage. Wound bed debrided.   Picture frame draped over periwound, three silver foams used.  Continue NPWT and two layer compression.    2/10/21: Periwound looks less denuded today. Did not place the arglaes powder to wound bed - switching to Vanessa to see if it will stimulate healing in his wound bed. Did not use hydrofiber to periwound but it is in the room for next visit in case it is needed. Ordered and used liquid lidocaine to remove vac dressing and lidocaine gel to the posterior proximal part of wound. Patient tolerated debridement well with lidocaine.   02/05/21: Addison wound is denuded and wet, vac drape lifting under wrap, Aquacel to dry wet and denuded skin, no silver foam available this dressing change.  02/03/21: Measurements are smaller for both medial and posterior thigh.  Wound bed has granulation and non-viable slough.  Will continue to debride every time with wound VAC changes to decrease bioburden in wound bed.   02/01/21:  No change.  Wounds appear to be improving slowly.      01/29/2021: wounds flush with periwound, fully granulated with scant yellow to a few areas. Medial periwound intact, lateral periwound with some erythema. LPS APRN suggested that next time applying brava strips to any irritated periwound may help reduce irritation.   1/27/21: Restarted NPWT, addison-wound looks better.   1/25/21: denuded tissue, vac vacation for until 1/27.  Re-assessment to place wound VAC to left LE.   1/21/21 area has decreased since last measurements.  Odor still present.  Addison skin compromised probably yeast infection under VAC drape.  Will hold VAC for 72 hours and re assess.  Nystatin to address yeast and dry out addison skin  1/15/21: position of leg may affect posterior thigh measurements.  Medial Leg wound area decreased.    01/10/2021: Wound vac replaced, patient accidentally removed vac and bedside RN was unable to repair.   Patient tolerated wound VAC placement. Patient had moderate ss with some green tinge drainage.   01/04/2021: 2 layer compression wrap re-applied to left LE due to ACE wrap leaving too much indentation to left LE.   12/31/2020 thigh wound much narrower.  Biofilm redeveloping and odor still present.   12/27/20: Wound bed granular and odor has lessen but still persists.   12/18/20 wound length decreased.  Odor persists.    12/14/2020: re-cultured wound bed.  12/11/2020 POD 23 Length of wounds has decreased, width has not changed.  Odor persists.  Pt continues to become angry with VAC when it limits his mobility.    12/7/2020 POD 19 odor persists, improved wound bed after debridement.  Possible bacterial vs yeast vs fungal infection.  Culture taken.  Nystatin to treat possible fungal infection, silver foam to decrease microbial population.    12/4/2020 POD# 16 odor worsening, more yellow tissue present, will add nystatin and silver foam next week and consider a culture  11/30 POD#11 granular buds visible to wound bed.  Same as prior.   11/27 POD#8 wound is odorous likely related to biologic dressing.  No overt signs of infection.  Granular buds are visible through adaptic.  Edges do not appear as thick as they were prior to last sx.  Continue with current treatment.    11/23: POD#4 s/p I&D of left LE wounds and biologic placed by Dr. Olvera on 11/19.  Wound bed is flatter and raised edges scar tissue seems to be gone.   11/14 Posterior thigh aspect of wound deteriorating, will increase frequency of treatment to keep biofilm down and hopefully avoid systemic abx.  Will include thigh in compression wrap.  Will consider culture if improvement is not seen in the next few treatments.    11/10: APRN unavailable at this time.  Will re-schedule excisional debridement to next week.   11/5: Plan for debridement of scarred edges on Tues by LPS. Tendon exposed to posterior aspect of wound, behind knee. Continue with current  dressing care.  10/29: Excisional debridement by Asaf ELLER of scar tissue along the proximal edge of the posterior knee and lateral thigh.  Lateral aspect of thigh is flatten.  Medial aspect of knee has thick scar tissue that was excisionally debrided by Asaf ELLER.  Fully granulated throughout the wound bed.   10/23 wound bed mostly granular, superficial in depth, progress has been slow with the wound VAC so will trial collagen product to encourage new tissue growth.    10/19: wound bed has improved in color and is fully granulated.  Addison-wound has improved, denudation has resolved. APRN continuing with serial weekly debridements as needed.   10/16: wound friable pt now on iv abx per ID.  Indurated edges addressed with drape and foam.  Addison wound likely has yeast infection - addressing with silver powder crusting  10/14: patient seen with Asaf ELLER, stopping veraflo instillation.  Starting silver powder and regular wound VAC to see if it will help with bioburden.  Possible colonization of bacteria.  ID involved.   10/12: Inflammation noted to the proximal part of the wound bed.  Will continue to monitor, possible vac break on Wednesday to help addison-skin inflammation.   10/7: Excisional debridement performed by Asaf ELLER. Will need to continue selective debridement with NPWT changes, and weekly excisional debridement with APRN to flatten out the scar tissue.  IV abx therapy ended 10/5.   10/5: Lateral wound still with some slough, both wounds smaller per measurements. Medial wound with all granular tissue.  9/30: Patient to start abx therapy for 7 days course per Dr. Ellis.  Started dakin's instillation to veraflo  9/28: malodorous today, culture taken.    9/25: Odor noted, though unclear if from wound or pt, consider shower before next Vac change. Consider regular vac next change, wound granular and superficial.   9/23: Patient still awaiting guardianship for placement.   9/18:  wound granulating nicely and responding well to current treatment.    9/16: Wound bed with granular tissue, edges are thick but appear better than previous assessments. . NPWT VF to encourage closure by secondary intention and manage drainage while encouraging oxygenation and granulation to the wound bed. 2 layer compression to assist in decrease of swelling and encourage blood flow to wounds. Wound team to follow up and change 3x/week.      Goals: Steady decrease in wound area and depth weekly.    NURSING PLAN OF CARE ORDERS (X):    Dressing changes: See Dressing Care orders: X  Skin care: See Skin Care orders:   Rectal tube care: See Rectal Tube Care orders:   Other orders:      WOUND TEAM PLAN OF CARE:   Dressing changes by wound team:        Follow up 3 times weekly:                NPWT change 3 times weekly:  X,  NPWT changes 3x/ weekly with 2 layer compression wrap.  Follow up 1-2 times weekly:      Follow up Bi-Monthly:                   Follow up as needed:       Other (explain):     Anticipated discharge plans:  LTACH:        SNF/Rehab: X - patient will need ongoing wound care for LLE upon discharge - 3x/weekly           Home Health Care:           Outpatient Wound Center:            Self Care:

## 2021-02-22 NOTE — PROGRESS NOTES
Report received from Day RN at [phone]. Assumed care of patient. Plan of care discussed, questions answered. Assessment completed. VSS, A&O x4, states pain LLE. PRN med given. Call light in reach. Patient educated on use of call light for assistance.    Wound vac in place.

## 2021-02-22 NOTE — CARE PLAN
Problem: Safety  Goal: Will remain free from injury  Outcome: PROGRESSING AS EXPECTED  Note: Call light in reach, bed in lowest and locked position, necessary belongings in reach. Patient educated on use of call light for assistance. Verbalized understanding.   Problem: Discharge Barriers/Planning  Goal: Patient's continuum of care needs will be met  Outcome: PROGRESSING AS EXPECTED  Note: Pending placement  Problem: Pain Management  Goal: Pain level will decrease to patient's comfort goal  Outcome: PROGRESSING AS EXPECTED  Note: Patient medicated for pain as per MAR. Patient encouraged to verbalize pain levels. Patient provided with nonpharmacologic methods of pain management. Verbalized understanding.

## 2021-02-22 NOTE — DISCHARGE PLANNING
Anticipated Discharge Disposition: Group Home    Action: MARTHAW facilitated zoom mtg between pt and his public guardian, Coretta.  Coretta introduced herself to the pt for the first time and asked questions to gather as much info as he could remember.  Pt struggled to answer many of the questions.  Coretta is going to be looking into what financial resources are available to the pt and stated that it could take a couple weeks.    Barriers to Discharge: pending financial resources, group home placement    Plan: CM to f/u w/ public guardian and assist w/ placement pending financials.

## 2021-02-23 PROCEDURE — 700102 HCHG RX REV CODE 250 W/ 637 OVERRIDE(OP): Performed by: NURSE PRACTITIONER

## 2021-02-23 PROCEDURE — A9270 NON-COVERED ITEM OR SERVICE: HCPCS | Performed by: NURSE PRACTITIONER

## 2021-02-23 PROCEDURE — 700111 HCHG RX REV CODE 636 W/ 250 OVERRIDE (IP): Performed by: NURSE PRACTITIONER

## 2021-02-23 PROCEDURE — 700101 HCHG RX REV CODE 250: Performed by: NURSE PRACTITIONER

## 2021-02-23 PROCEDURE — 770001 HCHG ROOM/CARE - MED/SURG/GYN PRIV*

## 2021-02-23 RX ORDER — LIDOCAINE HYDROCHLORIDE AND EPINEPHRINE BITARTRATE 20; .01 MG/ML; MG/ML
10 INJECTION, SOLUTION SUBCUTANEOUS ONCE
Status: COMPLETED | OUTPATIENT
Start: 2021-02-24 | End: 2021-02-24

## 2021-02-23 RX ADMIN — DIVALPROEX SODIUM 125 MG: 125 CAPSULE ORAL at 22:15

## 2021-02-23 RX ADMIN — OXYCODONE HYDROCHLORIDE 10 MG: 10 TABLET ORAL at 04:01

## 2021-02-23 RX ADMIN — RISPERIDONE 1 MG: 1 TABLET ORAL at 18:06

## 2021-02-23 RX ADMIN — GABAPENTIN 300 MG: 300 CAPSULE ORAL at 04:00

## 2021-02-23 RX ADMIN — GABAPENTIN 300 MG: 300 CAPSULE ORAL at 18:06

## 2021-02-23 RX ADMIN — AMLODIPINE BESYLATE 5 MG: 5 TABLET ORAL at 04:01

## 2021-02-23 RX ADMIN — DIVALPROEX SODIUM 125 MG: 125 CAPSULE ORAL at 04:01

## 2021-02-23 RX ADMIN — ENOXAPARIN SODIUM 40 MG: 40 INJECTION SUBCUTANEOUS at 04:00

## 2021-02-23 RX ADMIN — LIDOCAINE 1 PATCH: 50 PATCH CUTANEOUS at 18:07

## 2021-02-23 RX ADMIN — OXYCODONE HYDROCHLORIDE 10 MG: 10 TABLET ORAL at 18:06

## 2021-02-23 RX ADMIN — RISPERIDONE 0.5 MG: 0.5 TABLET ORAL at 04:00

## 2021-02-23 ASSESSMENT — PAIN DESCRIPTION - PAIN TYPE
TYPE: SURGICAL PAIN

## 2021-02-23 NOTE — CARE PLAN
Problem: Venous Thromboembolism (VTW)/Deep Vein Thrombosis (DVT) Prevention:  Goal: Patient will participate in Venous Thrombosis (VTE)/Deep Vein Thrombosis (DVT)Prevention Measures  Outcome: PROGRESSING AS EXPECTED     Patient encouraged to ambulate regularly. Patient is up ad erika in his room.       Problem: Knowledge Deficit  Goal: Knowledge of disease process/condition, treatment plan, diagnostic tests, and medications will improve  Outcome: PROGRESSING AS EXPECTED    Patient is alert and oriented x1-2. Re-orient patient regularly. Perform hourly rounding to ensure patient is aware of what is going on.

## 2021-02-24 PROCEDURE — 700102 HCHG RX REV CODE 250 W/ 637 OVERRIDE(OP): Performed by: NURSE PRACTITIONER

## 2021-02-24 PROCEDURE — 770001 HCHG ROOM/CARE - MED/SURG/GYN PRIV*

## 2021-02-24 PROCEDURE — 99231 SBSQ HOSP IP/OBS SF/LOW 25: CPT | Performed by: NURSE PRACTITIONER

## 2021-02-24 PROCEDURE — A9270 NON-COVERED ITEM OR SERVICE: HCPCS | Performed by: NURSE PRACTITIONER

## 2021-02-24 PROCEDURE — 700101 HCHG RX REV CODE 250: Performed by: NURSE PRACTITIONER

## 2021-02-24 PROCEDURE — 700111 HCHG RX REV CODE 636 W/ 250 OVERRIDE (IP): Performed by: NURSE PRACTITIONER

## 2021-02-24 PROCEDURE — 11042 DBRDMT SUBQ TIS 1ST 20SQCM/<: CPT | Performed by: NURSE PRACTITIONER

## 2021-02-24 PROCEDURE — 97606 NEG PRS WND THER DME>50 SQCM: CPT

## 2021-02-24 PROCEDURE — 11045 DBRDMT SUBQ TISS EACH ADDL: CPT | Performed by: NURSE PRACTITIONER

## 2021-02-24 RX ADMIN — CYCLOBENZAPRINE 10 MG: 10 TABLET, FILM COATED ORAL at 22:00

## 2021-02-24 RX ADMIN — RISPERIDONE 0.5 MG: 0.5 TABLET ORAL at 05:10

## 2021-02-24 RX ADMIN — DIVALPROEX SODIUM 125 MG: 125 CAPSULE ORAL at 05:10

## 2021-02-24 RX ADMIN — OXYCODONE HYDROCHLORIDE 10 MG: 10 TABLET ORAL at 00:04

## 2021-02-24 RX ADMIN — GABAPENTIN 300 MG: 300 CAPSULE ORAL at 17:21

## 2021-02-24 RX ADMIN — RISPERIDONE 1 MG: 1 TABLET ORAL at 17:21

## 2021-02-24 RX ADMIN — AMLODIPINE BESYLATE 5 MG: 5 TABLET ORAL at 05:10

## 2021-02-24 RX ADMIN — CYCLOBENZAPRINE 10 MG: 10 TABLET, FILM COATED ORAL at 00:04

## 2021-02-24 RX ADMIN — DIVALPROEX SODIUM 125 MG: 125 CAPSULE ORAL at 13:25

## 2021-02-24 RX ADMIN — ENOXAPARIN SODIUM 40 MG: 40 INJECTION SUBCUTANEOUS at 05:10

## 2021-02-24 RX ADMIN — OXYCODONE HYDROCHLORIDE 10 MG: 10 TABLET ORAL at 08:47

## 2021-02-24 RX ADMIN — HYDROXYZINE HYDROCHLORIDE 25 MG: 50 TABLET, FILM COATED ORAL at 12:01

## 2021-02-24 RX ADMIN — OXYCODONE HYDROCHLORIDE 10 MG: 10 TABLET ORAL at 17:21

## 2021-02-24 RX ADMIN — LIDOCAINE HYDROCHLORIDE AND EPINEPHRINE 20 ML: 20; 10 INJECTION, SOLUTION INFILTRATION; PERINEURAL at 09:56

## 2021-02-24 RX ADMIN — GABAPENTIN 300 MG: 300 CAPSULE ORAL at 05:05

## 2021-02-24 RX ADMIN — DIVALPROEX SODIUM 125 MG: 125 CAPSULE ORAL at 22:00

## 2021-02-24 RX ADMIN — GABAPENTIN 300 MG: 300 CAPSULE ORAL at 12:01

## 2021-02-24 RX ADMIN — HYDROXYZINE HYDROCHLORIDE 25 MG: 50 TABLET, FILM COATED ORAL at 22:00

## 2021-02-24 RX ADMIN — CYCLOBENZAPRINE 10 MG: 10 TABLET, FILM COATED ORAL at 12:01

## 2021-02-24 ASSESSMENT — PAIN DESCRIPTION - PAIN TYPE: TYPE: SURGICAL PAIN

## 2021-02-24 NOTE — WOUND TEAM
[facility]  Inpatient Services  Wound and Skin Care Progress Note    Admission Date: 8/7/2020     Last order of IP CONSULT TO WOUND CARE was found on 9/1/2020 from Hospital Encounter on 8/7/2020     HPI, PMH, SH: Reviewed    Unit where seen by Wound Team: [id number]    WOUND CONSULT/FOLLOW UP RELATED TO:  Left leg scheduled negative pressure wound therapy (npwt) dressing change and 2 layer compression wrap change    OBJECTIVE: NPWT dressing and 2 layer compression wrap in place. Patient on pressure redistribution mattress, up self, ambulates frequently, turns self.    WOUND TYPE, LOCATION, CHARACTERISTICS (Pressure Injuries: location, stage, POA or date identified)      Negative Pressure Wound Therapy 11/20/20 Leg Lateral;Posterior;Medial Left (Active)   NPWT Pump Mode / Pressure Setting Ulta;Continuous;150 mmHg 02/24/21 0900   Dressing Type Silver impregnated foam 02/24/21 0900   Number of Foam Pieces Used 4 02/24/21 0900   Canister Changed No 02/24/21 0900   Output (mL) 20 mL 02/22/21 0715   NEXT Dressing Change/Treatment Date 02/26/21 02/24/21 0900   WOUND NURSE ONLY - Time Spent with Patient (mins) 120 02/24/21 0900           Wound 11/19/20 Full Thickness Wound Leg Lateral;Posterior;Medial Left (Active)   Wound Image      02/24/21 0900   Site Assessment White;Yellow;Bleeding;Pink;Red 02/24/21 0900   Periwound Assessment Pink;Scar tissue 02/24/21 0900   Margins Attached edges 02/24/21 0900   Closure Secondary intention 02/24/21 0900   Drainage Amount Moderate 02/24/21 0900   Drainage Description Sanguineous 02/24/21 0900   Treatments Cleansed;CSWD - Conservative Sharp Wound Debridement;Site care;Compression 02/24/21 0900   Wound Cleansing Approved Wound Cleanser 02/24/21 0900   Periwound Protectant Skin Protectant Wipes to Periwound;Hydrocolloid 02/24/21 0900   Dressing Cleansing/Solutions Not Applicable 02/24/21 0900   Dressing Options Wound Vac 02/24/21 0900   Dressing Changed Changed 02/24/21 0900    Dressing Status Clean;Dry;Intact 02/24/21 0900   Dressing Change/Treatment Frequency By Wound Team Only 02/24/21 0900   NEXT Dressing Change/Treatment Date 02/26/21 02/24/21 0900   NEXT Weekly Photo (Inpatient Only) 03/01/21 02/24/21 0900   Non-staged Wound Description Full thickness 02/24/21 0900   Wound Length (cm) 12.6 cm 02/22/21 0830   Wound Width (cm) 3 cm 02/22/21 0830   Wound Depth (cm) 0.3 cm 02/22/21 0830   Wound Surface Area (cm^2) 37.8 cm^2 02/22/21 0830   Wound Volume (cm^3) 11.34 cm^3 02/22/21 0830   Post-Procedure Length (cm) 15 cm 02/03/21 1100   Post-Procedure Width (cm) 3.4 cm 02/03/21 1100   Post-Procedure Depth (cm) 0.2 cm 02/03/21 1100   Post-Procedure Surface Area (cm^2) 51 cm^2 02/03/21 1100   Post-Procedure Volume (cm^3) 10.2 cm^3 02/03/21 1100   Wound Healing % -11 02/22/21 0830   Wound Bed Granulation (%) 100 % 02/03/21 1100   Wound Bed Slough (%) 10 % 01/22/21 0912   Wound Bed Granulation (%) - Post-Procedure 100 % 02/03/21 1100   Tunneling (cm) 0 cm 02/10/21 1400   Undermining (cm) 0 cm 02/10/21 1400   Shape irregular 02/24/21 0900   Wound Odor Strong 02/24/21 0900   Pulses Left;2+;DP;PT 02/15/21 1400   Exposed Structures None 02/24/21 0900   WOUND NURSE ONLY - Time Spent with Patient (mins) 120 02/24/21 0900          Lab Values:    Lab Results   Component Value Date/Time    WBC 6.5 11/22/2020 02:44 AM    RBC 3.54 (L) 11/22/2020 02:44 AM    HEMOGLOBIN 9.9 (L) 11/22/2020 02:44 AM    HEMATOCRIT 30.5 (L) 11/22/2020 02:44 AM    CREACTPROT 1.07 (H) 08/12/2020 01:55 PM    SEDRATEWES 85 (H) 08/07/2020 01:20 PM    HBA1C 5.7 (H) 11/09/2018 06:30 PM        Culture Results show:  Recent Results (from the past 720 hour(s))   CULTURE WOUND W/ GRAM STAIN    Collection Time: 09/01/20  2:00 PM    Specimen: Left Leg; Wound   Result Value Ref Range    Significant Indicator POS (POS)     Source WND     Site LEFT LEG     Culture Result - (A)     Gram Stain Result       Moderate Gram positive  cocci.  Moderate Gram positive rods.      Culture Result (A)      Beta Hemolytic Streptococcus group A  Moderate growth      Culture Result (A)      Methicillin Resistant Staphylococcus aureus  Moderate growth      Culture Result (A)      Diphtheroids  Moderate growth  Mixed morphologies.     KOSTAS: 9/20/21   Left.    Doppler waveforms of the common femoral artery are of high amplitude and    triphasic.    Doppler waveforms at the ankle are brisk and triphasic.    Ankle-brachial index is normal.    Unable to obtained popliteal waveforms due to wound bandages.     Self Report / Pain Level: Medicated with oxycodone PRN, lidocaine injection and jelly     INTERVENTIONS BY WOUND TEAM: Wound care performed per standard procedures  Cleansed: Wound cleanser and gauze used to clean wound beds and periwound  Debridement: excisional debridement performed by RAFITA Hernandez. Slough debrided away using curette, > 20 cm2 debrided. Moderate bleeding noted, controlled with manual pressure and patient was medicated with lidocaine injection by RAFITA prior to debridement.   Periwound - Benzoin, hydrocolloid, drape bridge between the two   Primary - Agata to wound beds, 2 piece of silver foam half thickness cut to fit and placed to each wound bed, 1 silver piece used to bridge - all secured with drape, restarted NPWT 150mmHg, no leaks noted.   Secondary - 2 layer compression wrap foot/leg/thigh    Interdisciplinary consultation: Patient, Bedside RN (Brianne), Wound care RN Belen and Asaf Silveira     EVALUATION / RATIONALE FOR TREATMENT:  2/24/2021: Excisional debridement by JUANITO LEBRONN performed for rolled edges that were starting to form along the posterior and medial left leg wound. Moderate bleeding managed by administration of lidocaine injection. CSWD performed for slough. Wound bed is beefy red following debridement. Resume agata and silver foam to manage bioburden. Continue with CSWD with each dressing change.    2/22/2021: Rolled edges starting to form along posterior thigh wound bed - may require excisional debridement by provider. Minimal changes to actual wound bed. Continue CSWD, agata, and silver foam to manage bioburden.   2/19/21: minimal to no changes from last assessment. Continue CSWD, agata, and silver foam for bioburden.  2/17/2021: Periwound remains intact. Adherent slough noted over wound bed. Scant bleeding noted on medial wound following CSWD. Continue with Agata over wound beds to further stimulate epithelization. Continue with silver foam to manage bioburden.   2/12/2021:periwound intact does not appear macerated.  Moderate amount of drainage. Wound bed debrided.   Picture frame draped over periwound, three silver foams used.  Continue NPWT and two layer compression.    2/10/21: Periwound looks less denuded today. Did not place the arglaes powder to wound bed - switching to Agata to see if it will stimulate healing in his wound bed. Did not use hydrofiber to periwound but it is in the room for next visit in case it is needed. Ordered and used liquid lidocaine to remove vac dressing and lidocaine gel to the posterior proximal part of wound. Patient tolerated debridement well with lidocaine.   02/05/21: Meredith wound is denuded and wet, vac drape lifting under wrap, Aquacel to dry wet and denuded skin, no silver foam available this dressing change.  02/03/21: Measurements are smaller for both medial and posterior thigh.  Wound bed has granulation and non-viable slough.  Will continue to debride every time with wound VAC changes to decrease bioburden in wound bed.   02/01/21:  No change.  Wounds appear to be improving slowly.      01/29/2021: wounds flush with periwound, fully granulated with scant yellow to a few areas. Medial periwound intact, lateral periwound with some erythema. LPS APRN suggested that next time applying brava strips to any irritated periwound may help reduce irritation.   1/27/21:  Restarted NPWT, addison-wound looks better.   1/25/21: denuded tissue, vac vacation for until 1/27.  Re-assessment to place wound VAC to left LE.   1/21/21 area has decreased since last measurements.  Odor still present.  Addison skin compromised probably yeast infection under VAC drape.  Will hold VAC for 72 hours and re assess.  Nystatin to address yeast and dry out addison skin  1/15/21: position of leg may affect posterior thigh measurements.  Medial Leg wound area decreased.    01/10/2021: Wound vac replaced, patient accidentally removed vac and bedside RN was unable to repair.  Patient tolerated wound VAC placement. Patient had moderate ss with some green tinge drainage.   01/04/2021: 2 layer compression wrap re-applied to left LE due to ACE wrap leaving too much indentation to left LE.   12/31/2020 thigh wound much narrower.  Biofilm redeveloping and odor still present.   12/27/20: Wound bed granular and odor has lessen but still persists.   12/18/20 wound length decreased.  Odor persists.    12/14/2020: re-cultured wound bed.  12/11/2020 POD 23 Length of wounds has decreased, width has not changed.  Odor persists.  Pt continues to become angry with VAC when it limits his mobility.    12/7/2020 POD 19 odor persists, improved wound bed after debridement.  Possible bacterial vs yeast vs fungal infection.  Culture taken.  Nystatin to treat possible fungal infection, silver foam to decrease microbial population.    12/4/2020 POD# 16 odor worsening, more yellow tissue present, will add nystatin and silver foam next week and consider a culture  11/30 POD#11 granular buds visible to wound bed.  Same as prior.   11/27 POD#8 wound is odorous likely related to biologic dressing.  No overt signs of infection.  Granular buds are visible through adaptic.  Edges do not appear as thick as they were prior to last sx.  Continue with current treatment.    11/23: POD#4 s/p I&D of left LE wounds and biologic placed by Dr. Olvera on  11/19.  Wound bed is flatter and raised edges scar tissue seems to be gone.   11/14 Posterior thigh aspect of wound deteriorating, will increase frequency of treatment to keep biofilm down and hopefully avoid systemic abx.  Will include thigh in compression wrap.  Will consider culture if improvement is not seen in the next few treatments.    11/10: APRN unavailable at this time.  Will re-schedule excisional debridement to next week.   11/5: Plan for debridement of scarred edges on Tues by LPS. Tendon exposed to posterior aspect of wound, behind knee. Continue with current dressing care.  10/29: Excisional debridement by Asaf ELLER of scar tissue along the proximal edge of the posterior knee and lateral thigh.  Lateral aspect of thigh is flatten.  Medial aspect of knee has thick scar tissue that was excisionally debrided by Asaf ELLER.  Fully granulated throughout the wound bed.   10/23 wound bed mostly granular, superficial in depth, progress has been slow with the wound VAC so will trial collagen product to encourage new tissue growth.    10/19: wound bed has improved in color and is fully granulated.  Addison-wound has improved, denudation has resolved. APRN continuing with serial weekly debridements as needed.   10/16: wound friable pt now on iv abx per ID.  Indurated edges addressed with drape and foam.  Addison wound likely has yeast infection - addressing with silver powder crusting  10/14: patient seen with Asaf ELLER, stopping veraflo instillation.  Starting silver powder and regular wound VAC to see if it will help with bioburden.  Possible colonization of bacteria.  ID involved.   10/12: Inflammation noted to the proximal part of the wound bed.  Will continue to monitor, possible vac break on Wednesday to help addison-skin inflammation.   10/7: Excisional debridement performed by Asaf ELLER. Will need to continue selective debridement with NPWT changes, and weekly excisional  debridement with APRN to flatten out the scar tissue.  IV abx therapy ended 10/5.   10/5: Lateral wound still with some slough, both wounds smaller per measurements. Medial wound with all granular tissue.  9/30: Patient to start abx therapy for 7 days course per Dr. Ellis.  Started dakin's instillation to veraflo  9/28: malodorous today, culture taken.    9/25: Odor noted, though unclear if from wound or pt, consider shower before next Vac change. Consider regular vac next change, wound granular and superficial.   9/23: Patient still awaiting guardianship for placement.   9/18: wound granulating nicely and responding well to current treatment.    9/16: Wound bed with granular tissue, edges are thick but appear better than previous assessments. . NPWT VF to encourage closure by secondary intention and manage drainage while encouraging oxygenation and granulation to the wound bed. 2 layer compression to assist in decrease of swelling and encourage blood flow to wounds. Wound team to follow up and change 3x/week.      Goals: Steady decrease in wound area and depth weekly.    NURSING PLAN OF CARE ORDERS (X):    Dressing changes: See Dressing Care orders: X  Skin care: See Skin Care orders:   Rectal tube care: See Rectal Tube Care orders:   Other orders:      WOUND TEAM PLAN OF CARE:   Dressing changes by wound team:        Follow up 3 times weekly:                NPWT change 3 times weekly:  X,  NPWT changes 3x/ weekly with 2 layer compression wrap.  Follow up 1-2 times weekly:      Follow up Bi-Monthly:                   Follow up as needed:       Other (explain):     Anticipated discharge plans:  LTACH:        SNF/Rehab: X - patient will need ongoing wound care for LLE upon discharge - 3x/weekly           Home Health Care:           Outpatient Wound Center:            Self Care:

## 2021-02-24 NOTE — PROGRESS NOTES
Hospital Medicine Twice Weekly Progress Note    Date of Service  2/24/2021    Chief Complaint  Wound Infection    Hospital Course  Mr. Varela is a 65-year-old male with PMH alcohol dependence, tobacco dependence, psychiatric disorder, and HTN who presented to the emergency department 8/7/2020 with a left lower extremity wound infection. He was hospitalized at our facility in April and evaluated by orthopedic surgery who recommended wound care. Wound cultures at that time grew MSSA and Streptococcus. He had been seen at [facility] earlier that week and prescribed antibiotics, however he had not filled the prescription.  An ultrasound of that extremity was done and was negative for DVT.  He was treated for sepsis and completed an antibiotic course on 9/16/2020. He was also found to have head lice and underwent treatment for that.  A repeat wound culture was done on 9/28/2020 and grew Strep A and Proteus. Infectious disease was consulted and recommended a 5-day course of IV zosyn which was completed on 10/5/2020. On 10/12/2020 the wound care team noticed increased inflammation to the proximal part of the wound bed and switched from a vera flow wound VAC to a regular wound VAC.  ID was again consulted and on 10/14/2020 recommended to 7-day course of antibiotics with meropenem which was completed on 10/22/2020. Additionally, he was noted to have intermittent agitation so was started on risperdal, depakote, and gabapentin per psychiatric recommendations.  On 11/20/2020 he underwent left lower extremity debridement with wound VAC placement. Since then he has remained stable.  He has been deemed incapacitated to make medical decisions and guardianship was granted on 1/29/21. Placement will likely be in a group home.     Interval Problem Update  2/24- Patient up ambulating in room with wound vac attached to mobile pole. States he forgets he even has it. Wound care remains seeing patient for leg wound and debriding  as needed. Patient in good spirits, remains confused/ disoriented to situation with poor memory.     Consultants/Specialty  -LPS  -Psychiatry  -Infectious Disease    Code Status  Full Code    Disposition  guardianship granted 1/29/21. SW/CM may attempt to start working on placement.  Obtaining financials.    Review of Systems  Review of Systems   Unable to perform ROS: Mental acuity        Physical Exam  Temp:  [36.8 °C (98.2 °F)-36.9 °C (98.4 °F)] 36.8 °C (98.2 °F)  Pulse:  [65-70] 65  Resp:  [16] 16  BP: (118-121)/(64-76) 121/76  SpO2:  [94 %-98 %] 94 %    Physical Exam  Vitals and nursing note reviewed.   HENT:      Head: Normocephalic and atraumatic.      Nose: Nose normal.   Eyes:      Conjunctiva/sclera: Conjunctivae normal.      Pupils: Pupils are equal, round, and reactive to light.   Cardiovascular:      Rate and Rhythm: Normal rate and regular rhythm.      Heart sounds: No murmur. No friction rub.   Pulmonary:      Effort: No respiratory distress.   Abdominal:      General: Bowel sounds are normal. There is no distension.      Palpations: Abdomen is soft.   Musculoskeletal:      Cervical back: Neck supple.      Comments: LLE compression wrap dressing with negative pressure wound VAC CDI   Skin:     General: Skin is warm and dry.   Neurological:      Mental Status: He is alert. He is confused.   Psychiatric:         Attention and Perception: Attention normal.         Mood and Affect: Mood normal.         Speech: Speech normal.         Thought Content: Thought content is delusional.         Fluids    Intake/Output Summary (Last 24 hours) at 2/24/2021 1552  Last data filed at 2/24/2021 0800  Gross per 24 hour   Intake 240 ml   Output --   Net 240 ml       Laboratory                        Imaging  None recent    Assessment/Plan  * Wound of left leg- (present on admission)  Assessment & Plan  -Wound vac - he intermittently dislodges. Requires ongoing education  -Further management per wound care/LPS/ortho.    -Pain control as needed  -Improving    Encephalopathy- (present on admission)  Assessment & Plan  -Improved   -Per psychiatry and Dr. Velázquez on 1/11: Patient is incapacitated.  -Public Guardian: Coretta Benjamin   -Attempting placement; possibly [facility]    Psychiatric disorder- (present on admission)  Assessment & Plan  -Unspecified.  -Continue Risperdal and Depakote.  -Psychiatry has consulted and deemed him incapacitated to leave AMA or make medical decisions.  -Initial cognitive evaluation 8/20.   -SLP repeated Cognitive eval 1/2021- continue to recommend 24/7 supervision   -Public Guardian: Coretta Benjamin   -Pending [facility]; obtaining financial status    Essential hypertension- (present on admission)  Assessment & Plan  -Well controlled on amlodipine    Emphysema/COPD (HCC)- (present on admission)  Assessment & Plan  -Chronic and stable off medication, without acute exacerbation    Protein malnutrition (HCC)- (present on admission)  Assessment & Plan  -Body mass index is 21.35 kg/m².   -Continue TID supplements.  -Encourage PO intake    Flexion contracture of knee, 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)  Assessment & Plan  -Secondary to chronic wound in that area.  -PT/OT.  -Does not seem to limit his mobility.   - Is frequently ambulatory.    Infection of wound due to methicillin resistant Staphylococcus aureus (MRSA)- (present on admission)  Assessment & Plan  -History of MRSA.  -Poor compliance with OP wound care. Poor compliance with wound vac at times.   -Continue pain control as needed.  -LPS and wound care following - debridements and wound VAC changes per their recommendations  -Cultures repeated 12/14 - positive for Proteus species, pan sensitive.  Completed regimen of augmentin.   -resolved       VTE prophylaxis: Ambulatory     I have performed a physical exam and reviewed and updated ROS and Assessment/Plan today 2/24/2021. In review of the previous note there are no changes except as documented above    I  certify the patient requires continued medically necessary hospital services for the treatment of non-healing wound and cognitive disorder.  The patient will remain in the hospital for the foreseeable future.  Discharge may or may not occur in the next 20 days due to ongoing discharge delays due to having no medically acceptable discharge options.    LOUISE Candelario.

## 2021-02-25 PROCEDURE — 700102 HCHG RX REV CODE 250 W/ 637 OVERRIDE(OP): Performed by: NURSE PRACTITIONER

## 2021-02-25 PROCEDURE — A9270 NON-COVERED ITEM OR SERVICE: HCPCS | Performed by: NURSE PRACTITIONER

## 2021-02-25 PROCEDURE — 700111 HCHG RX REV CODE 636 W/ 250 OVERRIDE (IP): Performed by: NURSE PRACTITIONER

## 2021-02-25 PROCEDURE — 770001 HCHG ROOM/CARE - MED/SURG/GYN PRIV*

## 2021-02-25 RX ADMIN — OXYCODONE HYDROCHLORIDE 10 MG: 10 TABLET ORAL at 16:28

## 2021-02-25 RX ADMIN — CYCLOBENZAPRINE 10 MG: 10 TABLET, FILM COATED ORAL at 16:35

## 2021-02-25 RX ADMIN — OXYCODONE HYDROCHLORIDE 10 MG: 10 TABLET ORAL at 01:16

## 2021-02-25 RX ADMIN — HYDROXYZINE HYDROCHLORIDE 25 MG: 50 TABLET, FILM COATED ORAL at 22:16

## 2021-02-25 RX ADMIN — ENOXAPARIN SODIUM 40 MG: 40 INJECTION SUBCUTANEOUS at 06:07

## 2021-02-25 RX ADMIN — GABAPENTIN 300 MG: 300 CAPSULE ORAL at 13:38

## 2021-02-25 RX ADMIN — AMLODIPINE BESYLATE 5 MG: 5 TABLET ORAL at 06:07

## 2021-02-25 RX ADMIN — DIVALPROEX SODIUM 125 MG: 125 CAPSULE ORAL at 06:07

## 2021-02-25 RX ADMIN — GABAPENTIN 300 MG: 300 CAPSULE ORAL at 17:42

## 2021-02-25 RX ADMIN — RISPERIDONE 0.5 MG: 0.5 TABLET ORAL at 06:07

## 2021-02-25 RX ADMIN — DIVALPROEX SODIUM 125 MG: 125 CAPSULE ORAL at 22:16

## 2021-02-25 RX ADMIN — RISPERIDONE 1 MG: 1 TABLET ORAL at 17:42

## 2021-02-25 RX ADMIN — GABAPENTIN 300 MG: 300 CAPSULE ORAL at 06:07

## 2021-02-25 RX ADMIN — OXYCODONE HYDROCHLORIDE 10 MG: 10 TABLET ORAL at 22:16

## 2021-02-25 RX ADMIN — DIVALPROEX SODIUM 125 MG: 125 CAPSULE ORAL at 14:06

## 2021-02-25 RX ADMIN — HYDROXYZINE HYDROCHLORIDE 25 MG: 50 TABLET, FILM COATED ORAL at 13:37

## 2021-02-25 ASSESSMENT — PAIN DESCRIPTION - PAIN TYPE: TYPE: SURGICAL PAIN

## 2021-02-26 PROCEDURE — 700102 HCHG RX REV CODE 250 W/ 637 OVERRIDE(OP): Performed by: NURSE PRACTITIONER

## 2021-02-26 PROCEDURE — A9270 NON-COVERED ITEM OR SERVICE: HCPCS | Performed by: NURSE PRACTITIONER

## 2021-02-26 PROCEDURE — 700111 HCHG RX REV CODE 636 W/ 250 OVERRIDE (IP): Performed by: NURSE PRACTITIONER

## 2021-02-26 PROCEDURE — 770001 HCHG ROOM/CARE - MED/SURG/GYN PRIV*

## 2021-02-26 RX ADMIN — OXYCODONE HYDROCHLORIDE 10 MG: 10 TABLET ORAL at 05:49

## 2021-02-26 RX ADMIN — GABAPENTIN 300 MG: 300 CAPSULE ORAL at 12:05

## 2021-02-26 RX ADMIN — DIVALPROEX SODIUM 125 MG: 125 CAPSULE ORAL at 05:47

## 2021-02-26 RX ADMIN — GABAPENTIN 300 MG: 300 CAPSULE ORAL at 17:25

## 2021-02-26 RX ADMIN — GABAPENTIN 300 MG: 300 CAPSULE ORAL at 05:47

## 2021-02-26 RX ADMIN — RISPERIDONE 0.5 MG: 0.5 TABLET ORAL at 05:47

## 2021-02-26 RX ADMIN — GUAIFENESIN AND DEXTROMETHORPHAN 5 ML: 100; 10 SYRUP ORAL at 18:26

## 2021-02-26 RX ADMIN — DIVALPROEX SODIUM 125 MG: 125 CAPSULE ORAL at 22:40

## 2021-02-26 RX ADMIN — ENOXAPARIN SODIUM 40 MG: 40 INJECTION SUBCUTANEOUS at 05:48

## 2021-02-26 RX ADMIN — RISPERIDONE 1 MG: 1 TABLET ORAL at 17:25

## 2021-02-26 RX ADMIN — HYDROXYZINE HYDROCHLORIDE 25 MG: 50 TABLET, FILM COATED ORAL at 18:51

## 2021-02-26 RX ADMIN — CYCLOBENZAPRINE 10 MG: 10 TABLET, FILM COATED ORAL at 10:04

## 2021-02-26 RX ADMIN — DIVALPROEX SODIUM 125 MG: 125 CAPSULE ORAL at 14:41

## 2021-02-26 RX ADMIN — OXYCODONE HYDROCHLORIDE 10 MG: 10 TABLET ORAL at 12:05

## 2021-02-26 RX ADMIN — OXYCODONE HYDROCHLORIDE 10 MG: 10 TABLET ORAL at 22:40

## 2021-02-26 RX ADMIN — HYDROXYZINE HYDROCHLORIDE 25 MG: 50 TABLET, FILM COATED ORAL at 10:04

## 2021-02-26 ASSESSMENT — PAIN DESCRIPTION - PAIN TYPE
TYPE: ACUTE PAIN
TYPE: ACUTE PAIN

## 2021-02-26 NOTE — CARE PLAN
Problem: Safety  Goal: Will remain free from injury  Outcome: PROGRESSING AS EXPECTED  Goal: Will remain free from falls  Description: Pt mobilizes frequently independently.   Outcome: PROGRESSING AS EXPECTED     Problem: Infection  Goal: Will remain free from infection  Outcome: PROGRESSING AS EXPECTED     Problem: Venous Thromboembolism (VTW)/Deep Vein Thrombosis (DVT) Prevention:  Goal: Patient will participate in Venous Thrombosis (VTE)/Deep Vein Thrombosis (DVT)Prevention Measures  Outcome: PROGRESSING AS EXPECTED     Problem: Bowel/Gastric:  Goal: Normal bowel function is maintained or improved  Outcome: PROGRESSING AS EXPECTED  Goal: Will not experience complications related to bowel motility  Outcome: PROGRESSING AS EXPECTED     Problem: Fluid Volume:  Goal: Will maintain balanced intake and output  Outcome: PROGRESSING AS EXPECTED     Problem: Psychosocial Needs:  Goal: Level of anxiety will decrease  Outcome: PROGRESSING AS EXPECTED

## 2021-02-27 PROCEDURE — A9270 NON-COVERED ITEM OR SERVICE: HCPCS | Performed by: NURSE PRACTITIONER

## 2021-02-27 PROCEDURE — 700102 HCHG RX REV CODE 250 W/ 637 OVERRIDE(OP): Performed by: NURSE PRACTITIONER

## 2021-02-27 PROCEDURE — 770001 HCHG ROOM/CARE - MED/SURG/GYN PRIV*

## 2021-02-27 PROCEDURE — 97597 DBRDMT OPN WND 1ST 20 CM/<: CPT

## 2021-02-27 PROCEDURE — 700111 HCHG RX REV CODE 636 W/ 250 OVERRIDE (IP): Performed by: NURSE PRACTITIONER

## 2021-02-27 PROCEDURE — 99231 SBSQ HOSP IP/OBS SF/LOW 25: CPT | Performed by: NURSE PRACTITIONER

## 2021-02-27 RX ADMIN — RISPERIDONE 0.5 MG: 0.5 TABLET ORAL at 06:31

## 2021-02-27 RX ADMIN — HYDROXYZINE HYDROCHLORIDE 25 MG: 50 TABLET, FILM COATED ORAL at 13:35

## 2021-02-27 RX ADMIN — GABAPENTIN 300 MG: 300 CAPSULE ORAL at 12:55

## 2021-02-27 RX ADMIN — GABAPENTIN 300 MG: 300 CAPSULE ORAL at 06:31

## 2021-02-27 RX ADMIN — DIVALPROEX SODIUM 125 MG: 125 CAPSULE ORAL at 12:55

## 2021-02-27 RX ADMIN — OXYCODONE HYDROCHLORIDE 10 MG: 10 TABLET ORAL at 08:04

## 2021-02-27 RX ADMIN — DIVALPROEX SODIUM 125 MG: 125 CAPSULE ORAL at 20:31

## 2021-02-27 RX ADMIN — DIVALPROEX SODIUM 125 MG: 125 CAPSULE ORAL at 06:31

## 2021-02-27 RX ADMIN — OXYCODONE HYDROCHLORIDE 10 MG: 10 TABLET ORAL at 13:35

## 2021-02-27 RX ADMIN — RISPERIDONE 1 MG: 1 TABLET ORAL at 16:57

## 2021-02-27 RX ADMIN — OXYCODONE HYDROCHLORIDE 10 MG: 10 TABLET ORAL at 20:31

## 2021-02-27 RX ADMIN — ENOXAPARIN SODIUM 40 MG: 40 INJECTION SUBCUTANEOUS at 06:30

## 2021-02-27 RX ADMIN — CYCLOBENZAPRINE 10 MG: 10 TABLET, FILM COATED ORAL at 13:35

## 2021-02-27 RX ADMIN — GABAPENTIN 300 MG: 300 CAPSULE ORAL at 16:57

## 2021-02-27 RX ADMIN — AMLODIPINE BESYLATE 5 MG: 5 TABLET ORAL at 06:31

## 2021-02-27 ASSESSMENT — PAIN DESCRIPTION - PAIN TYPE
TYPE: ACUTE PAIN

## 2021-02-27 NOTE — WOUND TEAM
[facility]  Inpatient Services  Wound and Skin Care Progress Note    Admission Date: 8/7/2020     Last order of IP CONSULT TO WOUND CARE was found on 9/1/2020 from Hospital Encounter on 8/7/2020     HPI, PMH, SH: Reviewed    Unit where seen by Wound Team: [id number]    WOUND CONSULT/FOLLOW UP RELATED TO:  Left leg scheduled negative pressure wound therapy (npwt) dressing change and 2 layer compression wrap change    OBJECTIVE: NPWT dressing and 2 layer compression wrap in place. Patient on pressure redistribution mattress, up self, ambulates frequently, turns self.    WOUND TYPE, LOCATION, CHARACTERISTICS (Pressure Injuries: location, stage, POA or date identified)                  Negative Pressure Wound Therapy 11/20/20 Leg Lateral;Posterior;Medial Left (Active)   NPWT Pump Mode / Pressure Setting Ulta;Continuous;150 mmHg 02/27/21 1100   Dressing Type Silver impregnated foam 02/27/21 1100   Number of Foam Pieces Used 4 02/24/21 0900   Canister Changed No 02/27/21 1100   Output (mL) 20 mL 02/22/21 0715   NEXT Dressing Change/Treatment Date 02/26/21 02/24/21 0900   WOUND NURSE ONLY - Time Spent with Patient (mins) 120 02/24/21 0900           Wound 11/19/20 Full Thickness Wound Leg Lateral;Posterior;Medial Left (Active)   Wound Image    02/27/21 1033   Site Assessment White;Yellow;Bleeding;Pink;Red 02/27/21 1100   Periwound Assessment Pink;Scar tissue 02/27/21 1100   Margins Attached edges 02/27/21 1100   Closure Secondary intention 02/27/21 1100   Drainage Amount Moderate 02/27/21 1100   Drainage Description Serosanguineous 02/27/21 1100   Treatments Site care;Cleansed;CSWD - Conservative Sharp Wound Debridement 02/27/21 1100   Wound Cleansing Normal Saline Irrigation 02/27/21 1100   Periwound Protectant Hydrocolloid;Skin Protectant Wipes to Periwound 02/27/21 1100   Dressing Cleansing/Solutions Not Applicable 02/24/21 0900   Dressing Options Wound Vac 02/27/21 1100   Dressing Changed Changed 02/27/21 1100    Dressing Status Clean;Dry;Intact 02/27/21 0800   Dressing Change/Treatment Frequency By Wound Team Only 02/27/21 1100   NEXT Dressing Change/Treatment Date 03/01/21 02/27/21 1100   NEXT Weekly Photo (Inpatient Only) 03/06/21 02/27/21 1100   Non-staged Wound Description Full thickness 02/27/21 1100   Wound Length (cm) 12.6 cm 02/22/21 0830   Wound Width (cm) 3 cm 02/22/21 0830   Wound Depth (cm) 0.3 cm 02/22/21 0830   Wound Surface Area (cm^2) 37.8 cm^2 02/22/21 0830   Wound Volume (cm^3) 11.34 cm^3 02/22/21 0830   Post-Procedure Length (cm) 15 cm 02/03/21 1100   Post-Procedure Width (cm) 3.4 cm 02/03/21 1100   Post-Procedure Depth (cm) 0.2 cm 02/03/21 1100   Post-Procedure Surface Area (cm^2) 51 cm^2 02/03/21 1100   Post-Procedure Volume (cm^3) 10.2 cm^3 02/03/21 1100   Wound Healing % -11 02/22/21 0830   Wound Bed Granulation (%) 90 % 02/27/21 1100   Wound Bed Slough (%) 10 % 02/27/21 1100   Wound Bed Granulation (%) - Post-Procedure 100 % 02/03/21 1100   Tunneling (cm) 0 cm 02/10/21 1400   Undermining (cm) 0 cm 02/10/21 1400   Shape irregular 02/24/21 0900   Wound Odor Strong 02/24/21 0900   Pulses Left;2+;DP;PT 02/15/21 1400   Exposed Structures None 02/24/21 0900   WOUND NURSE ONLY - Time Spent with Patient (mins) 90 02/27/21 1100               Lab Values:    Lab Results   Component Value Date/Time    WBC 6.5 11/22/2020 02:44 AM    RBC 3.54 (L) 11/22/2020 02:44 AM    HEMOGLOBIN 9.9 (L) 11/22/2020 02:44 AM    HEMATOCRIT 30.5 (L) 11/22/2020 02:44 AM    CREACTPROT 1.07 (H) 08/12/2020 01:55 PM    SEDRATEWES 85 (H) 08/07/2020 01:20 PM    HBA1C 5.7 (H) 11/09/2018 06:30 PM        Culture Results show:  Recent Results (from the past 720 hour(s))   CULTURE WOUND W/ GRAM STAIN    Collection Time: 09/01/20  2:00 PM    Specimen: Left Leg; Wound   Result Value Ref Range    Significant Indicator POS (POS)     Source WND     Site LEFT LEG     Culture Result - (A)     Gram Stain Result       Moderate Gram positive  cocci.  Moderate Gram positive rods.      Culture Result (A)      Beta Hemolytic Streptococcus group A  Moderate growth      Culture Result (A)      Methicillin Resistant Staphylococcus aureus  Moderate growth      Culture Result (A)      Diphtheroids  Moderate growth  Mixed morphologies.     KOSTAS: 9/20/21   Left.    Doppler waveforms of the common femoral artery are of high amplitude and    triphasic.    Doppler waveforms at the ankle are brisk and triphasic.    Ankle-brachial index is normal.    Unable to obtained popliteal waveforms due to wound bandages.     Self Report / Pain Level: Medicated with oxycodone PRN, lidocaine injection and jelly     INTERVENTIONS BY WOUND TEAM: Wound care performed per standard procedures  Cleansed: Wound cleanser and gauze used to clean wound beds and periwound  Debridement: sharp debridement yellow Slough  using scalpel <20 cm2 debrided.   Periwound - Benzoin, hydrocolloid used around entire addison and to form bridge  Primary - Vanessa to wound beds, 2 piece of silver foam half thickness cut to fit and placed to each wound bed, 1 black piece used to bridge and form button under TRAC pad- all secured with drape, restarted NPWT 150mmHg, no leaks noted.   Secondary - 2 layer compression wrap foot/leg/thigh    Interdisciplinary consultation: Patient, Bedside RN     EVALUATION / RATIONALE FOR TREATMENT:  2/27/21 skin bridge enlarging  2/24/2021: Excisional debridement by LPS APRN performed for rolled edges that were starting to form along the posterior and medial left leg wound. Moderate bleeding managed by administration of lidocaine injection. CSWD performed for slough. Wound bed is beefy red following debridement. Resume vanessa and silver foam to manage bioburden. Continue with CSWD with each dressing change.   2/22/2021: Rolled edges starting to form along posterior thigh wound bed - may require excisional debridement by provider. Minimal changes to actual wound bed. Continue CSWD,  agata, and silver foam to manage bioburden.   2/19/21: minimal to no changes from last assessment. Continue CSWD, agata, and silver foam for bioburden.  2/17/2021: Periwound remains intact. Adherent slough noted over wound bed. Scant bleeding noted on medial wound following CSWD. Continue with Agata over wound beds to further stimulate epithelization. Continue with silver foam to manage bioburden.   2/12/2021:periwound intact does not appear macerated.  Moderate amount of drainage. Wound bed debrided.   Picture frame draped over periwound, three silver foams used.  Continue NPWT and two layer compression.    2/10/21: Periwound looks less denuded today. Did not place the arglaes powder to wound bed - switching to Agata to see if it will stimulate healing in his wound bed. Did not use hydrofiber to periwound but it is in the room for next visit in case it is needed. Ordered and used liquid lidocaine to remove vac dressing and lidocaine gel to the posterior proximal part of wound. Patient tolerated debridement well with lidocaine.   02/05/21: Meredith wound is denuded and wet, vac drape lifting under wrap, Aquacel to dry wet and denuded skin, no silver foam available this dressing change.  02/03/21: Measurements are smaller for both medial and posterior thigh.  Wound bed has granulation and non-viable slough.  Will continue to debride every time with wound VAC changes to decrease bioburden in wound bed.   02/01/21:  No change.  Wounds appear to be improving slowly.      01/29/2021: wounds flush with periwound, fully granulated with scant yellow to a few areas. Medial periwound intact, lateral periwound with some erythema. LPS APRN suggested that next time applying brava strips to any irritated periwound may help reduce irritation.   1/27/21: Restarted NPWT, meredith-wound looks better.   1/25/21: denuded tissue, vac vacation for until 1/27.  Re-assessment to place wound VAC to left LE.   1/21/21 area has decreased since  last measurements.  Odor still present.  Addison skin compromised probably yeast infection under VAC drape.  Will hold VAC for 72 hours and re assess.  Nystatin to address yeast and dry out addison skin  1/15/21: position of leg may affect posterior thigh measurements.  Medial Leg wound area decreased.    01/10/2021: Wound vac replaced, patient accidentally removed vac and bedside RN was unable to repair.  Patient tolerated wound VAC placement. Patient had moderate ss with some green tinge drainage.   01/04/2021: 2 layer compression wrap re-applied to left LE due to ACE wrap leaving too much indentation to left LE.   12/31/2020 thigh wound much narrower.  Biofilm redeveloping and odor still present.   12/27/20: Wound bed granular and odor has lessen but still persists.   12/18/20 wound length decreased.  Odor persists.    12/14/2020: re-cultured wound bed.  12/11/2020 POD 23 Length of wounds has decreased, width has not changed.  Odor persists.  Pt continues to become angry with VAC when it limits his mobility.    12/7/2020 POD 19 odor persists, improved wound bed after debridement.  Possible bacterial vs yeast vs fungal infection.  Culture taken.  Nystatin to treat possible fungal infection, silver foam to decrease microbial population.    12/4/2020 POD# 16 odor worsening, more yellow tissue present, will add nystatin and silver foam next week and consider a culture  11/30 POD#11 granular buds visible to wound bed.  Same as prior.   11/27 POD#8 wound is odorous likely related to biologic dressing.  No overt signs of infection.  Granular buds are visible through adaptic.  Edges do not appear as thick as they were prior to last sx.  Continue with current treatment.    11/23: POD#4 s/p I&D of left LE wounds and biologic placed by Dr. Olvera on 11/19.  Wound bed is flatter and raised edges scar tissue seems to be gone.   11/14 Posterior thigh aspect of wound deteriorating, will increase frequency of treatment to keep  biofilm down and hopefully avoid systemic abx.  Will include thigh in compression wrap.  Will consider culture if improvement is not seen in the next few treatments.    11/10: APRN unavailable at this time.  Will re-schedule excisional debridement to next week.   11/5: Plan for debridement of scarred edges on Tues by LPS. Tendon exposed to posterior aspect of wound, behind knee. Continue with current dressing care.  10/29: Excisional debridement by Asaf ELLER of scar tissue along the proximal edge of the posterior knee and lateral thigh.  Lateral aspect of thigh is flatten.  Medial aspect of knee has thick scar tissue that was excisionally debrided by Asaf ELLER.  Fully granulated throughout the wound bed.   10/23 wound bed mostly granular, superficial in depth, progress has been slow with the wound VAC so will trial collagen product to encourage new tissue growth.    10/19: wound bed has improved in color and is fully granulated.  Addison-wound has improved, denudation has resolved. APRN continuing with serial weekly debridements as needed.   10/16: wound friable pt now on iv abx per ID.  Indurated edges addressed with drape and foam.  Addison wound likely has yeast infection - addressing with silver powder crusting  10/14: patient seen with Asaf ELLER, stopping veraflo instillation.  Starting silver powder and regular wound VAC to see if it will help with bioburden.  Possible colonization of bacteria.  ID involved.   10/12: Inflammation noted to the proximal part of the wound bed.  Will continue to monitor, possible vac break on Wednesday to help addison-skin inflammation.   10/7: Excisional debridement performed by Asaf ELLER. Will need to continue selective debridement with NPWT changes, and weekly excisional debridement with APRN to flatten out the scar tissue.  IV abx therapy ended 10/5.   10/5: Lateral wound still with some slough, both wounds smaller per measurements. Medial wound with  all granular tissue.  9/30: Patient to start abx therapy for 7 days course per Dr. Ellis.  Started dakin's instillation to veraflo  9/28: malodorous today, culture taken.    9/25: Odor noted, though unclear if from wound or pt, consider shower before next Vac change. Consider regular vac next change, wound granular and superficial.   9/23: Patient still awaiting guardianship for placement.   9/18: wound granulating nicely and responding well to current treatment.    9/16: Wound bed with granular tissue, edges are thick but appear better than previous assessments. . NPWT VF to encourage closure by secondary intention and manage drainage while encouraging oxygenation and granulation to the wound bed. 2 layer compression to assist in decrease of swelling and encourage blood flow to wounds. Wound team to follow up and change 3x/week.      Goals: Steady decrease in wound area and depth weekly.    NURSING PLAN OF CARE ORDERS (X):    Dressing changes: See Dressing Care orders: X  Skin care: See Skin Care orders:   Rectal tube care: See Rectal Tube Care orders:   Other orders:      WOUND TEAM PLAN OF CARE:   Dressing changes by wound team:        Follow up 3 times weekly:                NPWT change 3 times weekly:  X,  NPWT changes 3x/ weekly with 2 layer compression wrap.  Follow up 1-2 times weekly:      Follow up Bi-Monthly:                   Follow up as needed:       Other (explain):     Anticipated discharge plans:  LTACH:        SNF/Rehab: X - patient will need ongoing wound care for LLE upon discharge - 3x/weekly           Home Health Care:           Outpatient Wound Center:            Self Care:

## 2021-02-27 NOTE — CARE PLAN
Problem: Safety  Goal: Will remain free from injury  Outcome: PROGRESSING AS EXPECTED  Call light and belongings in reach. Pt educated to call for assistance. Bed in lowest and locked position.      Problem: Venous Thromboembolism (VTW)/Deep Vein Thrombosis (DVT) Prevention:  Goal: Patient will participate in Venous Thrombosis (VTE)/Deep Vein Thrombosis (DVT)Prevention Measures  Outcome: PROGRESSING AS EXPECTED  Pt ambulates frequently.      Problem: Psychosocial Needs:  Goal: Level of anxiety will decrease  Outcome: PROGRESSING AS EXPECTED   Pt reoriented as needed.

## 2021-02-27 NOTE — CARE PLAN
Problem: Safety  Goal: Will remain free from injury  Outcome: PROGRESSING AS EXPECTED  Goal: Will remain free from falls  Description: Pt mobilizes frequently independently.   Outcome: PROGRESSING AS EXPECTED     Problem: Infection  Goal: Will remain free from infection  Outcome: PROGRESSING AS EXPECTED     Problem: Venous Thromboembolism (VTW)/Deep Vein Thrombosis (DVT) Prevention:  Goal: Patient will participate in Venous Thrombosis (VTE)/Deep Vein Thrombosis (DVT)Prevention Measures  Outcome: PROGRESSING AS EXPECTED     Problem: Knowledge Deficit  Goal: Knowledge of disease process/condition, treatment plan, diagnostic tests, and medications will improve  Outcome: PROGRESSING SLOWER THAN EXPECTED  Goal: Knowledge of the prescribed therapeutic regimen will improve  Outcome: PROGRESSING SLOWER THAN EXPECTED

## 2021-02-27 NOTE — PROGRESS NOTES
Pharmacy Pharmacotherapy Consult for LOS >30 days    Admit Date: 8/7/2020      Medications were reviewed for appropriateness and ongoing need.     Current Facility-Administered Medications   Medication Dose Route Frequency Provider Last Rate Last Admin   • guaiFENesin dextromethorphan (ROBITUSSIN DM) 100-10 MG/5ML syrup 5 mL  5 mL Oral Q6HRS PRN Lele Méndez, A.P.R.N.   5 mL at 02/21/21 1111   • lidocaine 2 % jelly 1 Application  1 Application Topical QDAY PRN Demarcus Mccabe, A.P.N.       • lidocaine (XYLOCAINE) 1%  injection  20 mL Other QDAY PRN Demarcus Mccabe, A.P.N.       • gabapentin (NEURONTIN) capsule 300 mg  300 mg Oral TID Charo Connor, A.P.R.N.   300 mg at 02/26/21 1205   • enoxaparin (LOVENOX) inj 40 mg  40 mg Subcutaneous DAILY Charo Connor, A.P.R.N.   40 mg at 02/26/21 0548   • oxyCODONE immediate release (ROXICODONE) tablet 10 mg  10 mg Oral Q6HRS PRN Jeovanny Andres, A.P.R.N.   10 mg at 02/26/21 1205   • cyclobenzaprine (Flexeril) tablet 10 mg  10 mg Oral TID PRN Charo Connor, A.P.R.N.   10 mg at 02/26/21 1004   • lidocaine (LIDODERM) 5 % 1 Patch  1 Patch Transdermal Q24HR Charo Connor, A.P.R.N.   Stopped at 02/24/21 1200   • hydrOXYzine HCl (ATARAX) tablet 25 mg  25 mg Oral TID PRN Charo Connor, A.P.R.N.   25 mg at 02/26/21 1004   • senna-docusate (PERICOLACE or SENOKOT S) 8.6-50 MG per tablet 2 Tab  2 tablet Oral BID PRN Charo Connor, A.P.R.N.   2 tablet at 12/07/20 0632   • risperiDONE (RISPERDAL) tablet 0.5 mg  0.5 mg Oral DAILY Charo Connor, A.P.R.N.   0.5 mg at 02/26/21 0547   • risperiDONE (RISPERDAL) tablet 1 mg  1 mg Oral Q EVENING Charo Connor, A.P.R.N.   1 mg at 02/25/21 1742   • divalproex (DEPAKOTE SPRINKLE) capsule 125 mg  125 mg Oral Q8HRS Charo Connor, A.P.R.N.   125 mg at 02/26/21 1441   • amLODIPine (NORVASC) tablet 5 mg  5 mg Oral Q DAY Charo Connor, A.P.R.N.   Stopped at 02/26/21 0600       Recommendations:  No recommendations at this  time.    Deanna Arce, PharmD

## 2021-02-28 PROCEDURE — A9270 NON-COVERED ITEM OR SERVICE: HCPCS | Performed by: NURSE PRACTITIONER

## 2021-02-28 PROCEDURE — 700102 HCHG RX REV CODE 250 W/ 637 OVERRIDE(OP): Performed by: NURSE PRACTITIONER

## 2021-02-28 PROCEDURE — 770001 HCHG ROOM/CARE - MED/SURG/GYN PRIV*

## 2021-02-28 PROCEDURE — 700111 HCHG RX REV CODE 636 W/ 250 OVERRIDE (IP): Performed by: NURSE PRACTITIONER

## 2021-02-28 PROCEDURE — 700101 HCHG RX REV CODE 250: Performed by: NURSE PRACTITIONER

## 2021-02-28 RX ADMIN — DIVALPROEX SODIUM 125 MG: 125 CAPSULE ORAL at 06:37

## 2021-02-28 RX ADMIN — LIDOCAINE 1 PATCH: 50 PATCH CUTANEOUS at 13:23

## 2021-02-28 RX ADMIN — OXYCODONE HYDROCHLORIDE 10 MG: 10 TABLET ORAL at 22:42

## 2021-02-28 RX ADMIN — OXYCODONE HYDROCHLORIDE 10 MG: 10 TABLET ORAL at 06:38

## 2021-02-28 RX ADMIN — ENOXAPARIN SODIUM 40 MG: 40 INJECTION SUBCUTANEOUS at 06:37

## 2021-02-28 RX ADMIN — DIVALPROEX SODIUM 125 MG: 125 CAPSULE ORAL at 13:23

## 2021-02-28 RX ADMIN — RISPERIDONE 0.5 MG: 0.5 TABLET ORAL at 06:37

## 2021-02-28 RX ADMIN — GABAPENTIN 300 MG: 300 CAPSULE ORAL at 06:37

## 2021-02-28 RX ADMIN — RISPERIDONE 1 MG: 1 TABLET ORAL at 18:09

## 2021-02-28 RX ADMIN — GABAPENTIN 300 MG: 300 CAPSULE ORAL at 18:09

## 2021-02-28 RX ADMIN — GABAPENTIN 300 MG: 300 CAPSULE ORAL at 13:23

## 2021-02-28 RX ADMIN — DIVALPROEX SODIUM 125 MG: 125 CAPSULE ORAL at 22:42

## 2021-02-28 RX ADMIN — AMLODIPINE BESYLATE 5 MG: 5 TABLET ORAL at 06:37

## 2021-02-28 RX ADMIN — OXYCODONE HYDROCHLORIDE 10 MG: 10 TABLET ORAL at 13:23

## 2021-02-28 ASSESSMENT — PAIN DESCRIPTION - PAIN TYPE
TYPE: CHRONIC PAIN
TYPE: CHRONIC PAIN
TYPE: ACUTE PAIN

## 2021-02-28 NOTE — PROGRESS NOTES
Hospital Medicine Twice Weekly Progress Note    Date of Service  2/27/2021    Chief Complaint  Wound Infection    Hospital Course  Mr. Varela is a 65-year-old male with PMH alcohol dependence, tobacco dependence, psychiatric disorder, and HTN who presented to the emergency department 8/7/2020 with a left lower extremity wound infection. He was hospitalized at our facility in April and evaluated by orthopedic surgery who recommended wound care. Wound cultures at that time grew MSSA and Streptococcus. He had been seen at [facility] earlier that week and prescribed antibiotics, however he had not filled the prescription.  An ultrasound of that extremity was done and was negative for DVT.  He was treated for sepsis and completed an antibiotic course on 9/16/2020. He was also found to have head lice and underwent treatment for that.  A repeat wound culture was done on 9/28/2020 and grew Strep A and Proteus. Infectious disease was consulted and recommended a 5-day course of IV zosyn which was completed on 10/5/2020. On 10/12/2020 the wound care team noticed increased inflammation to the proximal part of the wound bed and switched from a vera flow wound VAC to a regular wound VAC.  ID was again consulted and on 10/14/2020 recommended to 7-day course of antibiotics with meropenem which was completed on 10/22/2020. Additionally, he was noted to have intermittent agitation so was started on risperdal, depakote, and gabapentin per psychiatric recommendations.  On 11/20/2020 he underwent left lower extremity debridement with wound VAC placement. Since then he has remained stable.  He has been deemed incapacitated to make medical decisions and guardianship was granted on 1/29/21. Placement will likely be in a group home.     Interval Problem Update  2/24- Patient up ambulating in room with wound vac attached to mobile pole. States he forgets he even has it. Wound care remains seeing patient for leg wound and debriding  as needed. Patient in good spirits, remains confused/ disoriented to situation with poor memory.     2/27- Patient resting in bed, states no needs. Wound vac remains in place. Continue to pursue placement       Consultants/Specialty  -LPS  -Psychiatry  -Infectious Disease    Code Status  Full Code    Disposition  guardianship granted 1/29/21. SW/CM may attempt to start working on placement.  Obtaining financials.    Review of Systems  Review of Systems   Unable to perform ROS: Mental acuity        Physical Exam  Temp:  [36.1 °C (97 °F)-36.6 °C (97.8 °F)] 36.1 °C (97 °F)  Pulse:  [72-93] 93  Resp:  [16] 16  BP: (106-130)/(65-76) 106/76  SpO2:  [92 %-94 %] 94 %    Physical Exam  Vitals and nursing note reviewed.   HENT:      Head: Normocephalic and atraumatic.      Nose: Nose normal.   Eyes:      Conjunctiva/sclera: Conjunctivae normal.      Pupils: Pupils are equal, round, and reactive to light.   Cardiovascular:      Rate and Rhythm: Normal rate and regular rhythm.      Heart sounds: No murmur. No friction rub.   Pulmonary:      Effort: No respiratory distress.   Abdominal:      General: Bowel sounds are normal. There is no distension.      Palpations: Abdomen is soft.   Musculoskeletal:      Cervical back: Neck supple.      Comments: LLE compression wrap dressing with negative pressure wound VAC CDI   Skin:     General: Skin is warm and dry.   Neurological:      Mental Status: He is alert. He is confused.   Psychiatric:         Attention and Perception: Attention normal.         Mood and Affect: Mood normal.         Speech: Speech normal.         Thought Content: Thought content is delusional.         Fluids    Intake/Output Summary (Last 24 hours) at 2/28/2021 0848  Last data filed at 2/28/2021 0056  Gross per 24 hour   Intake 900 ml   Output --   Net 900 ml       Laboratory                        Imaging  None recent    Assessment/Plan  * Wound of left leg- (present on admission)  Assessment & Plan  -Wound vac -  he intermittently dislodges. Requires ongoing education  -Further management per wound care/LPS/ortho.   -Pain control as needed  -Improving    Encephalopathy- (present on admission)  Assessment & Plan  -Improved   -Per psychiatry and Dr. Velázquez on 1/11: Patient is incapacitated.  -Public Guardian: Coretta Benjamin   -Attempting placement; possibly [facility]    Psychiatric disorder- (present on admission)  Assessment & Plan  -Unspecified.  -Continue Risperdal and Depakote.  -Psychiatry has consulted and deemed him incapacitated to leave AMA or make medical decisions.  -Initial cognitive evaluation 8/20.   -SLP repeated Cognitive eval 1/2021- continue to recommend 24/7 supervision   -Public Guardian: Coretta Jenkins   -Pending [facility]; obtaining financial status    Essential hypertension- (present on admission)  Assessment & Plan  -Well controlled on amlodipine    Emphysema/COPD (HCC)- (present on admission)  Assessment & Plan  -Chronic and stable off medication, without acute exacerbation    Protein malnutrition (HCC)- (present on admission)  Assessment & Plan  -Body mass index is 21.35 kg/m².   -Continue TID supplements.  -Encourage PO intake    Flexion contracture of knee, 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)  Assessment & Plan  -Secondary to chronic wound in that area.  -PT/OT.  -Does not seem to limit his mobility.   - Is frequently ambulatory.    Infection of wound due to methicillin resistant Staphylococcus aureus (MRSA)- (present on admission)  Assessment & Plan  -History of MRSA.  -Poor compliance with OP wound care. Poor compliance with wound vac at times.   -Continue pain control as needed.  -LPS and wound care following - debridements and wound VAC changes per their recommendations  -Cultures repeated 12/14 - positive for Proteus species, pan sensitive.  Completed regimen of augmentin.   -resolved       VTE prophylaxis: Ambulatory     I have performed a physical exam and reviewed and updated ROS and Assessment/Plan  today 2/27/2021. In review of the previous note there are no changes except as documented above    I certify the patient requires continued medically necessary hospital services for the treatment of non-healing wound and cognitive disorder.  The patient will remain in the hospital for the foreseeable future.  Discharge may or may not occur in the next 20 days due to ongoing discharge delays due to having no medically acceptable discharge options.    LOUISE Candelario.

## 2021-02-28 NOTE — CARE PLAN
Problem: Safety  Goal: Will remain free from injury  Outcome: PROGRESSING AS EXPECTED   Bed locked and in lowest position. Treaded slipper socks on. Call light within reach. Pt educated to call for assistance. Patient up self.     Problem: Venous Thromboembolism (VTW)/Deep Vein Thrombosis (DVT) Prevention:  Goal: Patient will participate in Venous Thrombosis (VTE)/Deep Vein Thrombosis (DVT)Prevention Measures  Outcome: PROGRESSING AS EXPECTED   Patient receiving Lovenox injections and patient ambulates frequently.

## 2021-03-01 PROCEDURE — 700102 HCHG RX REV CODE 250 W/ 637 OVERRIDE(OP): Performed by: NURSE PRACTITIONER

## 2021-03-01 PROCEDURE — A9270 NON-COVERED ITEM OR SERVICE: HCPCS | Performed by: NURSE PRACTITIONER

## 2021-03-01 PROCEDURE — 97605 NEG PRS WND THER DME<=50SQCM: CPT | Mod: XU

## 2021-03-01 PROCEDURE — 97597 DBRDMT OPN WND 1ST 20 CM/<: CPT

## 2021-03-01 PROCEDURE — 770001 HCHG ROOM/CARE - MED/SURG/GYN PRIV*

## 2021-03-01 PROCEDURE — 700101 HCHG RX REV CODE 250: Performed by: NURSE PRACTITIONER

## 2021-03-01 PROCEDURE — 700111 HCHG RX REV CODE 636 W/ 250 OVERRIDE (IP): Performed by: NURSE PRACTITIONER

## 2021-03-01 RX ADMIN — RISPERIDONE 1 MG: 1 TABLET ORAL at 18:10

## 2021-03-01 RX ADMIN — OXYCODONE HYDROCHLORIDE 10 MG: 10 TABLET ORAL at 18:45

## 2021-03-01 RX ADMIN — ENOXAPARIN SODIUM 40 MG: 40 INJECTION SUBCUTANEOUS at 06:30

## 2021-03-01 RX ADMIN — GABAPENTIN 300 MG: 300 CAPSULE ORAL at 18:10

## 2021-03-01 RX ADMIN — AMLODIPINE BESYLATE 5 MG: 5 TABLET ORAL at 06:28

## 2021-03-01 RX ADMIN — DIVALPROEX SODIUM 125 MG: 125 CAPSULE ORAL at 06:29

## 2021-03-01 RX ADMIN — DIVALPROEX SODIUM 125 MG: 125 CAPSULE ORAL at 22:49

## 2021-03-01 RX ADMIN — RISPERIDONE 0.5 MG: 0.5 TABLET ORAL at 06:29

## 2021-03-01 RX ADMIN — LIDOCAINE 1 PATCH: 50 PATCH CUTANEOUS at 11:44

## 2021-03-01 RX ADMIN — OXYCODONE HYDROCHLORIDE 10 MG: 10 TABLET ORAL at 11:44

## 2021-03-01 RX ADMIN — GABAPENTIN 300 MG: 300 CAPSULE ORAL at 11:44

## 2021-03-01 RX ADMIN — GABAPENTIN 300 MG: 300 CAPSULE ORAL at 06:29

## 2021-03-01 ASSESSMENT — PAIN DESCRIPTION - PAIN TYPE
TYPE: ACUTE PAIN
TYPE: CHRONIC PAIN
TYPE: ACUTE PAIN;CHRONIC PAIN

## 2021-03-01 NOTE — WOUND TEAM
[facility]  Inpatient Services  Wound and Skin Care Progress Note    Admission Date: 8/7/2020     Last order of IP CONSULT TO WOUND CARE was found on 9/1/2020 from Hospital Encounter on 8/7/2020     HPI, PMH, SH: Reviewed    Unit where seen by Wound Team: [id number]    WOUND CONSULT/FOLLOW UP RELATED TO:  Left leg scheduled negative pressure wound therapy (npwt) dressing change and 2 layer compression wrap change    OBJECTIVE: NPWT dressing and 2 layer compression wrap in place. Patient on pressure redistribution mattress, up self, ambulates frequently, turns self.    WOUND TYPE, LOCATION, CHARACTERISTICS (Pressure Injuries: location, stage, POA or date identified)      Negative Pressure Wound Therapy 11/20/20 Leg Lateral;Posterior;Medial Left (Active)   Vacuum Serial Number [id number] 03/01/21 1000   NPWT Pump Mode / Pressure Setting Ulta;Continuous;150 mmHg    Dressing Type Silver impregnated foam, small black regular    Number of Foam Pieces Used 4 (2 Silver, 2 Black)    Canister Changed No    Output (mL) 20 mL    NEXT Dressing Change/Treatment Date 03/03/21    WOUND NURSE ONLY - Time Spent with Patient (mins) 120      Wound 11/19/20 Full Thickness Wound Leg Lateral;Posterior;Medial Left (Active)   Wound Image    02/27/21 1033   Site Assessment Red    Periwound Assessment Purple;Red    Margins Attached edges;Defined edges    Closure Secondary intention;Adhesive bandage    Drainage Amount Small    Drainage Description Serosanguineous    Treatments Cleansed;Site care;Offloading;Compression;CSWD - Conservative Sharp Wound Debridement    Wound Cleansing Approved Wound Cleanser    Periwound Protectant Skin Protectant Wipes to Periwound;Benzoin;Hydrocolloid;Drape    Dressing Cleansing/Solutions Not Applicable    Dressing Options Wound Vac    Dressing Changed Changed    Dressing Status Clean;Dry;Intact    Dressing Change/Treatment Frequency Monday, Wednesday, Friday, and As Needed    NEXT Dressing  Change/Treatment Date 03/03/21    NEXT Weekly Photo (Inpatient Only) 03/03/21    Non-staged Wound Description Full thickness    Wound Length (cm) 12.6 cm    Wound Width (cm) 3 cm    Wound Depth (cm) 0.3 cm    Wound Surface Area (cm^2) 37.8 cm^2    Wound Volume (cm^3) 11.34 cm^3    Post-Procedure Length (cm) 15 cm    Post-Procedure Width (cm) 3.4 cm    Post-Procedure Depth (cm) 0.2 cm    Post-Procedure Surface Area (cm^2) 51 cm^2    Post-Procedure Volume (cm^3) 10.2 cm^3    Wound Healing % -11    Wound Bed Granulation (%) 90 %    Wound Bed Slough (%) 10 %    Wound Bed Granulation (%) - Post-Procedure 100 %    Tunneling (cm) 0 cm    Undermining (cm) 0 cm    Shape Irregular linear x2    Wound Odor Mild    Pulses Left;2+;PT;DP    Exposed Structures None    WOUND NURSE ONLY - Time Spent with Patient (mins) 75      Lab Values:    Lab Results   Component Value Date/Time    WBC 6.5 11/22/2020 02:44 AM    RBC 3.54 (L) 11/22/2020 02:44 AM    HEMOGLOBIN 9.9 (L) 11/22/2020 02:44 AM    HEMATOCRIT 30.5 (L) 11/22/2020 02:44 AM    CREACTPROT 1.07 (H) 08/12/2020 01:55 PM    SEDRATEWES 85 (H) 08/07/2020 01:20 PM    HBA1C 5.7 (H) 11/09/2018 06:30 PM      Culture Results show:  Recent Results (from the past 720 hour(s))   CULTURE WOUND W/ GRAM STAIN    Collection Time: 09/01/20  2:00 PM    Specimen: Left Leg; Wound   Result Value Ref Range    Significant Indicator POS (POS)     Source WND     Site LEFT LEG     Culture Result - (A)     Gram Stain Result       Moderate Gram positive cocci.  Moderate Gram positive rods.      Culture Result (A)      Beta Hemolytic Streptococcus group A  Moderate growth      Culture Result (A)      Methicillin Resistant Staphylococcus aureus  Moderate growth      Culture Result (A)      Diphtheroids  Moderate growth  Mixed morphologies.     KOSTAS: 9/20/21   Left.    Doppler waveforms of the common femoral artery are of high amplitude and    triphasic.    Doppler waveforms at the ankle are brisk and triphasic.     Ankle-brachial index is normal.    Unable to obtained popliteal waveforms due to wound bandages.     Self Report / Pain Level: Medicated with oxycodone PRN, lidocaine injection and jelly     INTERVENTIONS BY WOUND TEAM: Wound care performed per standard procedures  Cleansed: Wound cleanser and gauze used to clean wound beds and periwound  Debridement: sharp debridement yellow Slough  using Curette <20 cm2 debrided. No bleeding noted.  Periwound - Benzoin, hydrocolloid used around entire addison and to form bridge  Primary - Vanessa to wound beds, 2 piece of silver foam half thickness cut to fit and placed to each wound bed, 1 black piece used to bridge and form button under TRAC pad- all secured with drape, restarted NPWT 150mmHg, no leaks noted.   Secondary - 2 layer compression wrap foot/leg/thigh    Interdisciplinary consultation: Patient, Bedside RN, Wound RN (Rai)     EVALUATION / RATIONALE FOR TREATMENT:  3/1/21: Lidocaine unavailable during this dressing change. Small amount of debridement completed with curette. Continued with silver powder for its antimicrobial properties.     2/27/21 skin bridge enlarging  2/24/2021: Excisional debridement by LPS APRN performed for rolled edges that were starting to form along the posterior and medial left leg wound. Moderate bleeding managed by administration of lidocaine injection. CSWD performed for slough. Wound bed is beefy red following debridement. Resume vanessa and silver foam to manage bioburden. Continue with CSWD with each dressing change.   2/22/2021: Rolled edges starting to form along posterior thigh wound bed - may require excisional debridement by provider. Minimal changes to actual wound bed. Continue CSWD, vanessa, and silver foam to manage bioburden.   2/19/21: minimal to no changes from last assessment. Continue CSWD, vanessa, and silver foam for bioburden.  2/17/2021: Periwound remains intact. Adherent slough noted over wound bed. Scant bleeding noted on  medial wound following CSWD. Continue with Vanessa over wound beds to further stimulate epithelization. Continue with silver foam to manage bioburden.   2/12/2021:periwound intact does not appear macerated.  Moderate amount of drainage. Wound bed debrided.   Picture frame draped over periwound, three silver foams used.  Continue NPWT and two layer compression.    2/10/21: Periwound looks less denuded today. Did not place the arglaes powder to wound bed - switching to Vanessa to see if it will stimulate healing in his wound bed. Did not use hydrofiber to periwound but it is in the room for next visit in case it is needed. Ordered and used liquid lidocaine to remove vac dressing and lidocaine gel to the posterior proximal part of wound. Patient tolerated debridement well with lidocaine.   02/05/21: Addison wound is denuded and wet, vac drape lifting under wrap, Aquacel to dry wet and denuded skin, no silver foam available this dressing change.  02/03/21: Measurements are smaller for both medial and posterior thigh.  Wound bed has granulation and non-viable slough.  Will continue to debride every time with wound VAC changes to decrease bioburden in wound bed.   02/01/21:  No change.  Wounds appear to be improving slowly.      01/29/2021: wounds flush with periwound, fully granulated with scant yellow to a few areas. Medial periwound intact, lateral periwound with some erythema. LPS APRN suggested that next time applying brava strips to any irritated periwound may help reduce irritation.   1/27/21: Restarted NPWT, addison-wound looks better.   1/25/21: denuded tissue, vac vacation for until 1/27.  Re-assessment to place wound VAC to left LE.   1/21/21 area has decreased since last measurements.  Odor still present.  Addison skin compromised probably yeast infection under VAC drape.  Will hold VAC for 72 hours and re assess.  Nystatin to address yeast and dry out addison skin  1/15/21: position of leg may affect posterior thigh  measurements.  Medial Leg wound area decreased.    01/10/2021: Wound vac replaced, patient accidentally removed vac and bedside RN was unable to repair.  Patient tolerated wound VAC placement. Patient had moderate ss with some green tinge drainage.   01/04/2021: 2 layer compression wrap re-applied to left LE due to ACE wrap leaving too much indentation to left LE.   12/31/2020 thigh wound much narrower.  Biofilm redeveloping and odor still present.   12/27/20: Wound bed granular and odor has lessen but still persists.   12/18/20 wound length decreased.  Odor persists.    12/14/2020: re-cultured wound bed.  12/11/2020 POD 23 Length of wounds has decreased, width has not changed.  Odor persists.  Pt continues to become angry with VAC when it limits his mobility.    12/7/2020 POD 19 odor persists, improved wound bed after debridement.  Possible bacterial vs yeast vs fungal infection.  Culture taken.  Nystatin to treat possible fungal infection, silver foam to decrease microbial population.    12/4/2020 POD# 16 odor worsening, more yellow tissue present, will add nystatin and silver foam next week and consider a culture  11/30 POD#11 granular buds visible to wound bed.  Same as prior.   11/27 POD#8 wound is odorous likely related to biologic dressing.  No overt signs of infection.  Granular buds are visible through adaptic.  Edges do not appear as thick as they were prior to last sx.  Continue with current treatment.    11/23: POD#4 s/p I&D of left LE wounds and biologic placed by Dr. Olvera on 11/19.  Wound bed is flatter and raised edges scar tissue seems to be gone.   11/14 Posterior thigh aspect of wound deteriorating, will increase frequency of treatment to keep biofilm down and hopefully avoid systemic abx.  Will include thigh in compression wrap.  Will consider culture if improvement is not seen in the next few treatments.    11/10: APRN unavailable at this time.  Will re-schedule excisional debridement to next  week.   11/5: Plan for debridement of scarred edges on Tues by LPS. Tendon exposed to posterior aspect of wound, behind knee. Continue with current dressing care.  10/29: Excisional debridement by Asaf ELLER of scar tissue along the proximal edge of the posterior knee and lateral thigh.  Lateral aspect of thigh is flatten.  Medial aspect of knee has thick scar tissue that was excisionally debrided by Asaf ELLER.  Fully granulated throughout the wound bed.   10/23 wound bed mostly granular, superficial in depth, progress has been slow with the wound VAC so will trial collagen product to encourage new tissue growth.    10/19: wound bed has improved in color and is fully granulated.  Addison-wound has improved, denudation has resolved. APRN continuing with serial weekly debridements as needed.   10/16: wound friable pt now on iv abx per ID.  Indurated edges addressed with drape and foam.  Addison wound likely has yeast infection - addressing with silver powder crusting  10/14: patient seen with Asaf ELLER, stopping veraflo instillation.  Starting silver powder and regular wound VAC to see if it will help with bioburden.  Possible colonization of bacteria.  ID involved.   10/12: Inflammation noted to the proximal part of the wound bed.  Will continue to monitor, possible vac break on Wednesday to help addison-skin inflammation.   10/7: Excisional debridement performed by Asaf ELLER. Will need to continue selective debridement with NPWT changes, and weekly excisional debridement with APRN to flatten out the scar tissue.  IV abx therapy ended 10/5.   10/5: Lateral wound still with some slough, both wounds smaller per measurements. Medial wound with all granular tissue.  9/30: Patient to start abx therapy for 7 days course per Dr. Ellis.  Started dakin's instillation to veraflo  9/28: malodorous today, culture taken.    9/25: Odor noted, though unclear if from wound or pt, consider shower before  next Vac change. Consider regular vac next change, wound granular and superficial.   9/23: Patient still awaiting guardianship for placement.   9/18: wound granulating nicely and responding well to current treatment.    9/16: Wound bed with granular tissue, edges are thick but appear better than previous assessments. . NPWT VF to encourage closure by secondary intention and manage drainage while encouraging oxygenation and granulation to the wound bed. 2 layer compression to assist in decrease of swelling and encourage blood flow to wounds. Wound team to follow up and change 3x/week.      Goals: Steady decrease in wound area and depth weekly.    NURSING PLAN OF CARE ORDERS (X):    Dressing changes: See Dressing Care orders: X  Skin care: See Skin Care orders:   Rectal tube care: See Rectal Tube Care orders:   Other orders:      WOUND TEAM PLAN OF CARE:   Dressing changes by wound team:        Follow up 3 times weekly:                NPWT change 3 times weekly:  X,  NPWT changes 3x/ weekly with 2 layer compression wrap.  Follow up 1-2 times weekly:      Follow up Bi-Monthly:                   Follow up as needed:       Other (explain):     Anticipated discharge plans:  LTACH:        SNF/Rehab: X - patient will need ongoing wound care for LLE upon discharge - 3x/weekly           Home Health Care:           Outpatient Wound Center:            Self Care:

## 2021-03-01 NOTE — CARE PLAN
Problem: Safety  Goal: Will remain free from injury  Outcome: PROGRESSING AS EXPECTED   Bed locked and in lowest position. Treaded slipper socks on. Call light within reach. Pt educated to call for assistance.       Problem: Bowel/Gastric:  Goal: Normal bowel function is maintained or improved  Outcome: PROGRESSING AS EXPECTED  Patient LBM 3/1/2021.

## 2021-03-01 NOTE — PROGRESS NOTES
Mobility Progress Note    Surgery patient: Yes  Date of surgery: 9/1/2020  Ambulated 50 ft on day of surgery? (N/A if patient did not undergo surgery today): N/A  Number of times ambulated 50 feet or greater today: 5  Patient has been up to chair, edge of bed or HOB 90 degrees for all meals?: Yes  Goal met? (goal is ambulating at least 50 feet 2 times on day shift, one time on night shift): Yes  If patient did not meet mobility goal, why?: Patient up self. Ambulates frequently.

## 2021-03-01 NOTE — PROGRESS NOTES
Patient intermittently agitated today. Refusing noon vitals with this RN. Keeps asking when he can get his pain meds that were just given 1 hr prior. Will continue to reorient.

## 2021-03-01 NOTE — CARE PLAN
Problem: Safety  Goal: Will remain free from injury  Outcome: PROGRESSING AS EXPECTED  Bed in lowest and locked position. Call light and belongings in reach. Pt educated to call for assistance. Pt reoriented as needed throughout shift.      Problem: Mobility  Goal: Risk for activity intolerance will decrease  Outcome: PROGRESSING AS EXPECTED   Mobility Progress Note    Surgery patient: N/A  Date of surgery: 11/19/2020  Ambulated 50 ft on day of surgery? (N/A if patient did not undergo surgery today): N/A  Number of times ambulated 50 feet or greater today: Walks to bathroom and in hallways frequently.   Patient has been up to chair, edge of bed or HOB 90 degrees for all meals?: Yes  Goal met? (goal is ambulating at least 50 feet 2 times on day shift, one time on night shift): Yes  If patient did not meet mobility goal, why?: N/A

## 2021-03-02 PROCEDURE — A9270 NON-COVERED ITEM OR SERVICE: HCPCS | Performed by: NURSE PRACTITIONER

## 2021-03-02 PROCEDURE — 700102 HCHG RX REV CODE 250 W/ 637 OVERRIDE(OP): Performed by: NURSE PRACTITIONER

## 2021-03-02 PROCEDURE — 700111 HCHG RX REV CODE 636 W/ 250 OVERRIDE (IP): Performed by: NURSE PRACTITIONER

## 2021-03-02 PROCEDURE — 770001 HCHG ROOM/CARE - MED/SURG/GYN PRIV*

## 2021-03-02 PROCEDURE — 700101 HCHG RX REV CODE 250: Performed by: NURSE PRACTITIONER

## 2021-03-02 RX ORDER — OXYCODONE HYDROCHLORIDE 10 MG/1
10 TABLET ORAL EVERY 6 HOURS PRN
Status: DISCONTINUED | OUTPATIENT
Start: 2021-03-02 | End: 2021-03-07

## 2021-03-02 RX ADMIN — ENOXAPARIN SODIUM 40 MG: 40 INJECTION SUBCUTANEOUS at 05:27

## 2021-03-02 RX ADMIN — DIVALPROEX SODIUM 125 MG: 125 CAPSULE ORAL at 13:38

## 2021-03-02 RX ADMIN — RISPERIDONE 0.5 MG: 0.5 TABLET ORAL at 05:27

## 2021-03-02 RX ADMIN — RISPERIDONE 1 MG: 1 TABLET ORAL at 17:01

## 2021-03-02 RX ADMIN — GABAPENTIN 300 MG: 300 CAPSULE ORAL at 17:01

## 2021-03-02 RX ADMIN — AMLODIPINE BESYLATE 5 MG: 5 TABLET ORAL at 05:27

## 2021-03-02 RX ADMIN — DIVALPROEX SODIUM 125 MG: 125 CAPSULE ORAL at 22:16

## 2021-03-02 RX ADMIN — OXYCODONE HYDROCHLORIDE 10 MG: 10 TABLET ORAL at 05:27

## 2021-03-02 RX ADMIN — OXYCODONE HYDROCHLORIDE 10 MG: 10 TABLET ORAL at 22:16

## 2021-03-02 RX ADMIN — OXYCODONE HYDROCHLORIDE 10 MG: 10 TABLET ORAL at 16:04

## 2021-03-02 RX ADMIN — DIVALPROEX SODIUM 125 MG: 125 CAPSULE ORAL at 05:27

## 2021-03-02 RX ADMIN — LIDOCAINE 1 PATCH: 50 PATCH CUTANEOUS at 12:03

## 2021-03-02 RX ADMIN — GABAPENTIN 300 MG: 300 CAPSULE ORAL at 05:27

## 2021-03-02 RX ADMIN — GABAPENTIN 300 MG: 300 CAPSULE ORAL at 12:03

## 2021-03-02 ASSESSMENT — PAIN DESCRIPTION - PAIN TYPE
TYPE: CHRONIC PAIN
TYPE: CHRONIC PAIN;SURGICAL PAIN
TYPE: ACUTE PAIN

## 2021-03-02 NOTE — CARE PLAN
Problem: Safety  Goal: Will remain free from injury  Outcome: PROGRESSING AS EXPECTED   Fall Precautions in place: Non-skid socks on, bed locked and in lowest position, upper bed rails up, DME in bathroom, call light within reach.     Problem: Venous Thromboembolism (VTW)/Deep Vein Thrombosis (DVT) Prevention:  Goal: Patient will participate in Venous Thrombosis (VTE)/Deep Vein Thrombosis (DVT)Prevention Measures  Outcome: PROGRESSING AS EXPECTED  Patient ambulatory, ambulates around the unit throughout the day.  Lovenox for DVT prophylaxis

## 2021-03-02 NOTE — DIETARY
NUTRITION SERVICES  POD #6 s/p bedside I&D. Diet order is regular. RN reports pt has not consumed breakfast as he is sleeping. No documentation for past 4 meals. However, PO intake prior was % majority of meals. NP called to request re-initiation of Arginaid supplements to support wound healing. Will send Arginaid supplements 4 times per day for 2 weeks. RN aware. Please continue to document PO consumed of meals and supplements on ADL flow sheet.    RD to follow per department policy.

## 2021-03-02 NOTE — PROGRESS NOTES
WOUND CARE PROVIDER PROGRESS NOTE    POD #6 s/p bedside I&D with injectable lidocaine and epinephrine      -Wound team continues to follow for VAC changes consisting of Vanessa, silver foam at 150 mmHg continuous  -Photos reviewed  -Wound size slightly decreased from day of procedure however wounds essentially remained unchanged.  Last received L-arginine supplement course December 2020.        Plan  Restart L-arginine 2 weeks supplementation course.  Continue VAC change and 2 layer compression wrap 3 times per week      Discussed with dietitian Marla, patient, RN

## 2021-03-03 DIAGNOSIS — Z23 NEED FOR VACCINATION: ICD-10-CM

## 2021-03-03 PROCEDURE — A9270 NON-COVERED ITEM OR SERVICE: HCPCS | Performed by: NURSE PRACTITIONER

## 2021-03-03 PROCEDURE — 700111 HCHG RX REV CODE 636 W/ 250 OVERRIDE (IP): Performed by: NURSE PRACTITIONER

## 2021-03-03 PROCEDURE — 99232 SBSQ HOSP IP/OBS MODERATE 35: CPT | Performed by: NURSE PRACTITIONER

## 2021-03-03 PROCEDURE — 700102 HCHG RX REV CODE 250 W/ 637 OVERRIDE(OP): Performed by: NURSE PRACTITIONER

## 2021-03-03 PROCEDURE — 97606 NEG PRS WND THER DME>50 SQCM: CPT

## 2021-03-03 PROCEDURE — 700101 HCHG RX REV CODE 250: Performed by: NURSE PRACTITIONER

## 2021-03-03 PROCEDURE — 770001 HCHG ROOM/CARE - MED/SURG/GYN PRIV*

## 2021-03-03 RX ADMIN — RISPERIDONE 1 MG: 1 TABLET ORAL at 16:57

## 2021-03-03 RX ADMIN — HYDROXYZINE HYDROCHLORIDE 25 MG: 50 TABLET, FILM COATED ORAL at 22:14

## 2021-03-03 RX ADMIN — GABAPENTIN 300 MG: 300 CAPSULE ORAL at 12:14

## 2021-03-03 RX ADMIN — GABAPENTIN 300 MG: 300 CAPSULE ORAL at 04:26

## 2021-03-03 RX ADMIN — RISPERIDONE 0.5 MG: 0.5 TABLET ORAL at 04:26

## 2021-03-03 RX ADMIN — OXYCODONE HYDROCHLORIDE 10 MG: 10 TABLET ORAL at 22:14

## 2021-03-03 RX ADMIN — DIVALPROEX SODIUM 125 MG: 125 CAPSULE ORAL at 14:37

## 2021-03-03 RX ADMIN — DIVALPROEX SODIUM 125 MG: 125 CAPSULE ORAL at 22:14

## 2021-03-03 RX ADMIN — AMLODIPINE BESYLATE 5 MG: 5 TABLET ORAL at 04:26

## 2021-03-03 RX ADMIN — OXYCODONE HYDROCHLORIDE 10 MG: 10 TABLET ORAL at 09:44

## 2021-03-03 RX ADMIN — GABAPENTIN 300 MG: 300 CAPSULE ORAL at 16:57

## 2021-03-03 RX ADMIN — DIVALPROEX SODIUM 125 MG: 125 CAPSULE ORAL at 04:26

## 2021-03-03 RX ADMIN — LIDOCAINE 1 PATCH: 50 PATCH CUTANEOUS at 12:13

## 2021-03-03 RX ADMIN — ENOXAPARIN SODIUM 40 MG: 40 INJECTION SUBCUTANEOUS at 04:26

## 2021-03-03 RX ADMIN — OXYCODONE HYDROCHLORIDE 10 MG: 10 TABLET ORAL at 16:08

## 2021-03-03 RX ADMIN — OXYCODONE HYDROCHLORIDE 10 MG: 10 TABLET ORAL at 04:26

## 2021-03-03 RX ADMIN — CYCLOBENZAPRINE 10 MG: 10 TABLET, FILM COATED ORAL at 12:34

## 2021-03-03 ASSESSMENT — PAIN DESCRIPTION - PAIN TYPE
TYPE: CHRONIC PAIN
TYPE: CHRONIC PAIN;ACUTE PAIN
TYPE: CHRONIC PAIN;SURGICAL PAIN
TYPE: CHRONIC PAIN

## 2021-03-03 NOTE — WOUND TEAM
[facility]  Inpatient Services  Wound and Skin Care Progress Note    Admission Date: 8/7/2020     Last order of IP CONSULT TO WOUND CARE was found on 9/1/2020 from Hospital Encounter on 8/7/2020     HPI, PMH, SH: Reviewed    Unit where seen by Wound Team: [id number]    WOUND CONSULT/FOLLOW UP RELATED TO:  Left leg scheduled negative pressure wound therapy (npwt) dressing change and 2 layer compression wrap change    OBJECTIVE: NPWT dressing and 2 layer compression wrap in place. Patient on pressure redistribution mattress, up self, ambulates frequently, turns self.    WOUND TYPE, LOCATION, CHARACTERISTICS (Pressure Injuries: location, stage, POA or date identified)      Negative Pressure Wound Therapy 11/20/20 Leg Lateral;Posterior;Medial Left (Active)   Vacuum Serial Number [id number] 03/01/21 1000   NPWT Pump Mode / Pressure Setting Ulta;Continuous;150 mmHg 03/03/21 1000   Dressing Type Medium;Black Foam (Regular) 03/03/21 1000   Number of Foam Pieces Used 3 03/03/21 1000   Canister Changed No 03/03/21 1000   Output (mL) 20 mL 02/22/21 0715   NEXT Dressing Change/Treatment Date 03/05/21 03/03/21 1000   WOUND NURSE ONLY - Time Spent with Patient (mins) 120 02/24/21 0900           Wound 11/19/20 Full Thickness Wound Leg Lateral;Posterior;Medial Left (Active)   Wound Image    02/27/21 1033   Site Assessment Red;Early/partial granulation 03/03/21 1000   Periwound Assessment Scar tissue;Denuded 03/03/21 1000   Margins Attached edges 03/03/21 1000   Closure Secondary intention 03/03/21 1000   Drainage Amount Moderate 03/03/21 1000   Drainage Description Serosanguineous 03/03/21 1000   Treatments Cleansed;Irrigation;CSWD - Conservative Sharp Wound Debridement;Site care;Compression 03/03/21 1000   Wound Cleansing Approved Wound Cleanser 03/03/21 1000   Periwound Protectant Benzoin;Hydrocolloid;Drape 03/03/21 1000   Dressing Cleansing/Solutions Not Applicable 03/03/21 1000   Dressing Options Silver Powder;Wound  Vac;Compression Wrap Two Layer;Collagen Dressing 03/03/21 1000   Dressing Changed Changed 03/03/21 1000   Dressing Status Clean;Dry;Intact 03/03/21 1000   Dressing Change/Treatment Frequency Monday, Wednesday, Friday, and As Needed 03/03/21 1000   NEXT Dressing Change/Treatment Date 03/05/21 03/03/21 1000   NEXT Weekly Photo (Inpatient Only) 03/05/21 03/03/21 1000   Non-staged Wound Description Full thickness 03/03/21 1000   Wound Length (cm) 12.6 cm 02/22/21 0830   Wound Width (cm) 3 cm 02/22/21 0830   Wound Depth (cm) 0.3 cm 02/22/21 0830   Wound Surface Area (cm^2) 37.8 cm^2 02/22/21 0830   Wound Volume (cm^3) 11.34 cm^3 02/22/21 0830   Post-Procedure Length (cm) 15 cm 02/03/21 1100   Post-Procedure Width (cm) 3.4 cm 02/03/21 1100   Post-Procedure Depth (cm) 0.2 cm 02/03/21 1100   Post-Procedure Surface Area (cm^2) 51 cm^2 02/03/21 1100   Post-Procedure Volume (cm^3) 10.2 cm^3 02/03/21 1100   Wound Healing % -11 02/22/21 0830   Wound Bed Granulation (%) 90 % 02/27/21 1100   Wound Bed Slough (%) 10 % 02/27/21 1100   Wound Bed Granulation (%) - Post-Procedure 100 % 02/03/21 1100   Tunneling (cm) 0 cm 02/10/21 1400   Undermining (cm) 0 cm 02/10/21 1400   Shape Irregular linear 03/03/21 1000   Wound Odor Mild 03/03/21 1000   Pulses Left;2+;DP;PT 03/03/21 1000   Exposed Structures None 03/03/21 1000   WOUND NURSE ONLY - Time Spent with Patient (mins) 60 03/03/21 1000          Lab Values:    Lab Results   Component Value Date/Time    WBC 6.5 11/22/2020 02:44 AM    RBC 3.54 (L) 11/22/2020 02:44 AM    HEMOGLOBIN 9.9 (L) 11/22/2020 02:44 AM    HEMATOCRIT 30.5 (L) 11/22/2020 02:44 AM    CREACTPROT 1.07 (H) 08/12/2020 01:55 PM    SEDRATEWES 85 (H) 08/07/2020 01:20 PM    HBA1C 5.7 (H) 11/09/2018 06:30 PM      Culture Results show:  Recent Results (from the past 720 hour(s))   CULTURE WOUND W/ GRAM STAIN    Collection Time: 09/01/20  2:00 PM    Specimen: Left Leg; Wound   Result Value Ref Range    Significant Indicator POS  (POS)     Source WND     Site LEFT LEG     Culture Result - (A)     Gram Stain Result       Moderate Gram positive cocci.  Moderate Gram positive rods.      Culture Result (A)      Beta Hemolytic Streptococcus group A  Moderate growth      Culture Result (A)      Methicillin Resistant Staphylococcus aureus  Moderate growth      Culture Result (A)      Diphtheroids  Moderate growth  Mixed morphologies.     KOSTAS: 9/20/21   Left.    Doppler waveforms of the common femoral artery are of high amplitude and    triphasic.    Doppler waveforms at the ankle are brisk and triphasic.    Ankle-brachial index is normal.    Unable to obtained popliteal waveforms due to wound bandages.     Self Report / Pain Level: Medicated with oxycodone PRN, lidocaine injection and jelly     INTERVENTIONS BY WOUND TEAM: INTERVENTIONS BY WOUND TEAM:  Chart and images reviewed. Discussed with bedside RN. All areas of concern (based on picture review, LDA review and discussion with bedside RN) have been thoroughly assessed. Documentation of areas based on significant findings. This RN in to assess patient. Performed standard wound care which includes appropriate positioning, dressing removal and non-selective debridement. Pictures and measurements obtained weekly if/when required.  Cleansed: Wound cleanser and gauze used to clean wound beds and periwound  Debridement: sharp debridement yellow Slough  using Curette <20 cm2 debrided. No bleeding noted.  Periwound - Benzoin, hydrocolloid used around entire addison and to form bridge  Primary - Vanessa to wound beds, 2 piece of black foam half thickness cut to fit and placed to each wound bed, 1 black piece used to bridge and form button under TRAC pad- all secured with drape, restarted NPWT 150mmHg, no leaks noted.   Secondary - 2 layer compression wrap foot/leg/thigh     Interdisciplinary consultation: Patient, Bedside RN    EVALUATION / RATIONALE FOR TREATMENT:  3/1/21: Lidocaine unavailable during this  dressing change. Small amount of debridement completed with curette. Continued with silver powder for its antimicrobial properties.   2/27/21 skin bridge enlarging  2/24/2021: Excisional debridement by LPS APRN performed for rolled edges that were starting to form along the posterior and medial left leg wound. Moderate bleeding managed by administration of lidocaine injection. CSWD performed for slough. Wound bed is beefy red following debridement. Resume agata and silver foam to manage bioburden. Continue with CSWD with each dressing change.   2/22/2021: Rolled edges starting to form along posterior thigh wound bed - may require excisional debridement by provider. Minimal changes to actual wound bed. Continue CSWD, agata, and silver foam to manage bioburden.   2/19/21: minimal to no changes from last assessment. Continue CSWD, agata, and silver foam for bioburden.  2/17/2021: Periwound remains intact. Adherent slough noted over wound bed. Scant bleeding noted on medial wound following CSWD. Continue with Agata over wound beds to further stimulate epithelization. Continue with silver foam to manage bioburden.   2/12/2021:periwound intact does not appear macerated.  Moderate amount of drainage. Wound bed debrided.   Picture frame draped over periwound, three silver foams used.  Continue NPWT and two layer compression.    2/10/21: Periwound looks less denuded today. Did not place the arglaes powder to wound bed - switching to Agata to see if it will stimulate healing in his wound bed. Did not use hydrofiber to periwound but it is in the room for next visit in case it is needed. Ordered and used liquid lidocaine to remove vac dressing and lidocaine gel to the posterior proximal part of wound. Patient tolerated debridement well with lidocaine.   02/05/21: Meredith wound is denuded and wet, vac drape lifting under wrap, Aquacel to dry wet and denuded skin, no silver foam available this dressing change.  02/03/21:  Measurements are smaller for both medial and posterior thigh.  Wound bed has granulation and non-viable slough.  Will continue to debride every time with wound VAC changes to decrease bioburden in wound bed.   02/01/21:  No change.  Wounds appear to be improving slowly.      01/29/2021: wounds flush with periwound, fully granulated with scant yellow to a few areas. Medial periwound intact, lateral periwound with some erythema. LPS APRN suggested that next time applying brava strips to any irritated periwound may help reduce irritation.   1/27/21: Restarted NPWT, addison-wound looks better.   1/25/21: denuded tissue, vac vacation for until 1/27.  Re-assessment to place wound VAC to left LE.   1/21/21 area has decreased since last measurements.  Odor still present.  Addison skin compromised probably yeast infection under VAC drape.  Will hold VAC for 72 hours and re assess.  Nystatin to address yeast and dry out addison skin  1/15/21: position of leg may affect posterior thigh measurements.  Medial Leg wound area decreased.    01/10/2021: Wound vac replaced, patient accidentally removed vac and bedside RN was unable to repair.  Patient tolerated wound VAC placement. Patient had moderate ss with some green tinge drainage.   01/04/2021: 2 layer compression wrap re-applied to left LE due to ACE wrap leaving too much indentation to left LE.   12/31/2020 thigh wound much narrower.  Biofilm redeveloping and odor still present.   12/27/20: Wound bed granular and odor has lessen but still persists.   12/18/20 wound length decreased.  Odor persists.    12/14/2020: re-cultured wound bed.  12/11/2020 POD 23 Length of wounds has decreased, width has not changed.  Odor persists.  Pt continues to become angry with VAC when it limits his mobility.    12/7/2020 POD 19 odor persists, improved wound bed after debridement.  Possible bacterial vs yeast vs fungal infection.  Culture taken.  Nystatin to treat possible fungal infection, silver foam  to decrease microbial population.    12/4/2020 POD# 16 odor worsening, more yellow tissue present, will add nystatin and silver foam next week and consider a culture  11/30 POD#11 granular buds visible to wound bed.  Same as prior.   11/27 POD#8 wound is odorous likely related to biologic dressing.  No overt signs of infection.  Granular buds are visible through adaptic.  Edges do not appear as thick as they were prior to last sx.  Continue with current treatment.    11/23: POD#4 s/p I&D of left LE wounds and biologic placed by Dr. Olvera on 11/19.  Wound bed is flatter and raised edges scar tissue seems to be gone.   11/14 Posterior thigh aspect of wound deteriorating, will increase frequency of treatment to keep biofilm down and hopefully avoid systemic abx.  Will include thigh in compression wrap.  Will consider culture if improvement is not seen in the next few treatments.    11/10: APRN unavailable at this time.  Will re-schedule excisional debridement to next week.   11/5: Plan for debridement of scarred edges on Tues by LPS. Tendon exposed to posterior aspect of wound, behind knee. Continue with current dressing care.  10/29: Excisional debridement by Asaf ELLER of scar tissue along the proximal edge of the posterior knee and lateral thigh.  Lateral aspect of thigh is flatten.  Medial aspect of knee has thick scar tissue that was excisionally debrided by Asaf ELLER.  Fully granulated throughout the wound bed.   10/23 wound bed mostly granular, superficial in depth, progress has been slow with the wound VAC so will trial collagen product to encourage new tissue growth.    10/19: wound bed has improved in color and is fully granulated.  Meredith-wound has improved, denudation has resolved. APRN continuing with serial weekly debridements as needed.   10/16: wound friable pt now on iv abx per ID.  Indurated edges addressed with drape and foam.  Meredith wound likely has yeast infection - addressing with  silver powder crusting  10/14: patient seen with Asaf ELLER, stopping veraflo instillation.  Starting silver powder and regular wound VAC to see if it will help with bioburden.  Possible colonization of bacteria.  ID involved.   10/12: Inflammation noted to the proximal part of the wound bed.  Will continue to monitor, possible vac break on Wednesday to help addison-skin inflammation.   10/7: Excisional debridement performed by Asaf ELLER. Will need to continue selective debridement with NPWT changes, and weekly excisional debridement with APRN to flatten out the scar tissue.  IV abx therapy ended 10/5.   10/5: Lateral wound still with some slough, both wounds smaller per measurements. Medial wound with all granular tissue.  9/30: Patient to start abx therapy for 7 days course per Dr. Ellis.  Started dakin's instillation to veraflo  9/28: malodorous today, culture taken.    9/25: Odor noted, though unclear if from wound or pt, consider shower before next Vac change. Consider regular vac next change, wound granular and superficial.   9/23: Patient still awaiting guardianship for placement.   9/18: wound granulating nicely and responding well to current treatment.    9/16: Wound bed with granular tissue, edges are thick but appear better than previous assessments. . NPWT VF to encourage closure by secondary intention and manage drainage while encouraging oxygenation and granulation to the wound bed. 2 layer compression to assist in decrease of swelling and encourage blood flow to wounds. Wound team to follow up and change 3x/week.      Goals: Steady decrease in wound area and depth weekly.    NURSING PLAN OF CARE ORDERS (X):    Dressing changes: See Dressing Care orders: X  Skin care: See Skin Care orders:   Rectal tube care: See Rectal Tube Care orders:   Other orders:      WOUND TEAM PLAN OF CARE:   Dressing changes by wound team:        Follow up 3 times weekly:                NPWT change 3 times  weekly:  X,  NPWT changes 3x/ weekly with 2 layer compression wrap.  Follow up 1-2 times weekly:      Follow up Bi-Monthly:                   Follow up as needed:       Other (explain):     Anticipated discharge plans:  LTACH:        SNF/Rehab: X - patient will need ongoing wound care for LLE upon discharge - 3x/weekly           Home Health Care:           Outpatient Wound Center:            Self Care:

## 2021-03-03 NOTE — PROGRESS NOTES
"WOUND CARE PROVIDER PROGRESS NOTE        HPI: 65-year-old male homelessness, EtOH use, tobacco dependence, chronic left lower extremity wound admitted 8/7/2020 with left lower extremity wound infection.  Patient was recently hospitalized at [facility] in April 2020, evaluated by orthopedic surgery who recommended wound care.  Wound culture grew MSSA and strep.  Patient was recently seen at [facility] prior to this admission was given a prescription for antibiotics but did not fill this.  Patient reports that he has had this wound for 8 to 10 years.  He reports that he fell and went \"ass over tea kettle\".  He reports that he would clean the wound almost daily with soap and water at the homeless shelter.  Per chart review, patient reported he developed the ulcer in May 2018 when he fell while hiking.  Patient was seen at wound care clinic in August 2018.  Patient had a  assisting him while he was living at [facility].  Wound VAC /was ordered however  had concerns with patient's compliancy and/ access to medical care.  Skilled nursing facility was contacted to have patient be admitted, however he was not accepted due to Medicaid.    Not on any blood thinners.  Patient ambulatory in halls.  Allergies reviewed.  He denies any allergies.    Surgery date: 9/1/2020 by Belkis ELLER  Procedure: Excision debridement to left leg ulcer with injectable lidocaine and epinephrine     Surgery date: 11/19/2020 by Dr. Olvera  Procedure:1.  Irrigation and debridement of multiple compartments of the left leg with 2   separate 16 cm x 5 cm superficial ulcerations in posterior knee and calf.  2.  AmnioFix biologic sheet placement, left leg.  3.  Wound VAC placement, left leg, 16x5 + 16x5 cm.      Surgery date: 2/24/21 by Belkis ELLER  Procedure: Excision debridement to left leg ulcer with injectable lidocaine and epinephrine             Interval hx:   9/11/2020: pt calm cooperative. On " zyvox. Seen during VAC and two layer compression wrap change. Pt tolerating VAC and wraps. Wound decreased in size.   9/18/2020: Patient denies any fevers, chills, nausea, vomiting.  He is tolerating the veraflo wound VAC and 2 layer compression wrap.  Wound is decreasing in size.  Increased granular tissue noted, wound edges have improved however he does have rolled edges to popliteal fossa area.  Patient calm and cooperative, watching sports.  9/30/2020: Denies fevers, chills, nausea, vomiting.  Tolerating wound VAC and 2 layer compression wrap.  Refused nail care.  Wound culture positive for strep A and Proteus.  10/7/2020: completed 5 day course of zyvox. Denies fevers chills nausea vomiting.  Seen during veraflo VAC and 2 layer compression wrap change.    10/14/2020: Patient denies fevers, chills, nausea, vomiting.  Patient wound is malodorous complaining of increased pain to the wound.  Increased slough noted to wound bed despite veraflo wound VAC.  Reconsult ID.  10/16/2020: Patient seen during wound VAC change with Miranda wound care PT. patient denies fever, chills, nausea, vomiting.  Patient reports pain to wound and increase in pain with wound VAC change.  Patient has granular tissue throughout wound with mild amount of slough to posterior aspect of leg.  Malodorous with VAC removal.  Wound VAC nursing changed.  10/29/2020: Wound VAC was discontinued by wound team on 10/23/2020 due to slow progress and collagen was started.  Patient has completed 7-day antibiotic course of meropenem for multidrug resistant Proteus vulgaris.  Patient found to have lice and has been treated.  Nonfoul-smelling odor present with dressing removal.  11/5/2020: Seen with wound team during 2 layer compression dressing change and for excisional debridement.  No odor noted today.  Thick layer of biofilm noted.  Wound edges continue to improve but still remain to medial aspect of wound.  11/17/2020: Seen with wound team during  dressing and 2 layer compression wrap change.  Patient with severely thick scar tissue to wound edges.  Excisional debridement with injectable lidocaine and epinephrine performed today.  Patient denies any fevers, chills, nausea, vomiting.  11/23/2020: POD #4 SP left leg I&D with biologic and VAC placement.  VAC functioning.  Foul odor coming from wound.  11/30/2020: POD #11.  Patient tired of walking around with wound VAC machine.  But agreeable to continue with wound VAC.  Mild odor coming from wound with wound VAC removal.  12/7/2020: POD #18.  Seen during wound VAC change.  Sitter no longer at bedside.  12/14/2020 POD # 25.  Seen during wound VAC change with wound team.  Previous wound culture from last week showed organisms but they were not worked up.  New wound culture to be taken today.  Odor remains slightly diminished.  Drainage heavy, slightly decreased from last week.  Continue with nystatin powder and silver foam.  Patient tired today.  12/16/2020: Wound culture 12/14/2020 + for Proteus Mirabilis.  Discussed case with ID, previously following this admit.  Recommended Augmentin for 1 week.  12/17/2020: Patient not drinking boost.  Has stockpile in room.  Dietary consulted.  Started Augmentin yesterday. Contacted micro to review culture.  So far growing Proteus and diphtheroids.  Micro to assess for fungal component.  1/4/2021: Wounds have  into 2 wounds again and are decreasing in size.  No longer doing silver foam but rather Arglaes powder and nystatin powder.  Odor remains.  Completed antibiotics.  Guardianship hearing is 1/29/2021.  Had arginaid supplement 4 times per day for 2 weeks that started on 12/17/2020.  1/22/2021: Seen with wound team for VAC change.  Odor is decreased.  Skin bridges have formed and now patient has 3 smaller wounds.  However there is increased periwound breakdown.  Patient experiencing pain with VAC change to proximal thigh.  Topical lidocaine used.  Wound VAC held  for weekend due to periwound breakdown.  2/5/2021: Seen with wound team during VAC change.  Odor remains the same.  There is no longer skin bridge to the medial wound  it into 2.  Patient has drainage seeping out underneath the drape causing periwound irritation.  Excisional debridement was performed today.  Patient has guardian.  2/24/21: Seen with wound team during VAC change.  Odor remains.  Epibole to skin edges.  There is significant amount of slough and tenderness to lateral proximal thigh ulcer.  Excisional debridement performed today with injectable lidocaine and epinephrine.  Consent obtained from guardian.  3/3/21: POD # 7 s/p bedside I & D. Restarted L arginine supplements. Has not drank supplement yet today.             Results:  Wound culture left leg 12/14/2020: No fungal growth, Proteus mirabilis  Wound culture left leg 12/7/2020:Light growth mixed organisms.   Covid screen not detected 11/17/2020  CBC 11/22/2020: WBC 6.5, hemoglobin and hematocrit 9.9/30.5.  Platelet count 403.  CBC with differential 10/14/2020: WBC 6, H&H 11/33.9  Wound culture: 9/28/2020: Positive for beta-hemolytic strep group A, Proteus.    Wound cx: 9/1/2020 mrsa, diphtheroids, beta hemolytic strep group A    8/7/2020: X-ray tib-fib left:  1.  Healed distal metaphyseal fractures of the left fibula and tibia with tibial IM layla in place.     2.  No acute fracture or dislocation.     3.  No radiopaque soft tissue foreign body.      Arterial studies:   9/2/2020  Right.    Doppler waveforms of the common femoral artery are of high amplitude and    triphasic.    Doppler waveforms at the ankle are brisk and triphasic.    Ankle-brachial index is normal.       Left.    Doppler waveforms of the common femoral artery are of high amplitude and    triphasic.    Doppler waveforms at the ankle are brisk and triphasic.    Ankle-brachial index is normal.    Unable to obtained popliteal waveforms due to wound  bandages.          Exam:    Palpable PT pulse to left foot +1 foot   +2 DP left foot  Difficulty palpating popliteal due to scar tissue  Able to extend left leg to 160 degrees. Able to flex left leg around 80 degrees    Excessive senescent epidermis to R foot, manually removed  Mycotic overgrown toenails, last trimmed by wound care APRN on 11/17/20    Left lower leg full-thickness ulcer  Odor remains the same.  Drainage has decreased slightly  Medial wound is no longer .        Lateral ulcer:  Wound edges flat  Thin biofilm   slight tenderness to proximal aspect  No periwound erythema  periwound improved since using hydrocolloid  Edema controlled  Measures: 15.5 x 3.5 x 0.1cm      Medial ulcer:  Wound edges flat  Thin biofilm over wound  Edema controlled  No periwound irritation noted   measurement: 14 x 3 x 0.1cm         - CSWD and Wound care completed by wound RN Sera, refer to flowsheet.  -Patient tolerated the procedure well, without c/o pain or discomfort.                   ASSESSMENT/PLAN:  65-year-old male with homelessness, EtOH use, tobacco use, and chronic left leg ulcer.  SP left leg I&D with amnio fix and wound VAC placement by Dr. Olvera on 11/19/2020    -Wound remains as 2 wounds.  Skin bridge to medial wound is no longer present   Wound size remains essentially unchanged.   -L arginine supplements restarted 3/2/21.   -periwound irritation to proximal aspect of lateral wound improved with hydrocolloid.  -Odor remains, heavy drainage slightly decreased  -biofilm debrided each visit  -Edema controlled with 2 layer compression wrap.  -Patient has completed 1 week of Augmentin and L-arginine supplement for 2 weeks in December 2020  -No plans for [facility]Cell at this time.  -overgrown mycotic toenails, last trimmed 11/2020. Excessive senescent epidermis to R foot      PLAN  -Continue wound VAC.  Change 3 times per week  - hydrocolloid to periwound, Vanessa, silver foam, drape to wound at 125 mmHg  continuous  -Continue 3 times a week debridement to wound to reduce biofilm  -Continue 2 layer compression wrap extending to thigh  -foot and nail care ordered  -shower prior to VAC changes, ok to remove 2 layer compression wrap to shower    Patient to continue to be seen by wound care team while admitted   wound care nurse practitioner to follow as needed      Discharge plan:  Working on group home placement.  Patient has guardian.      D/W: Patient, RN, Sera Simons, RN with wound team, Pankaj ELLER

## 2021-03-03 NOTE — CARE PLAN
Problem: Psychosocial Needs:  Goal: Level of anxiety will decrease  Outcome: PROGRESSING AS EXPECTED  Pt restless, pacing and agitated at times. Easily redirected with food and distraction. Frequent reorientation provided.     Problem: Mobility  Goal: Risk for activity intolerance will decrease  Outcome: PROGRESSING AS EXPECTED  Ambulates frequently in hallways and room with steady gait.

## 2021-03-04 PROCEDURE — 99231 SBSQ HOSP IP/OBS SF/LOW 25: CPT | Performed by: NURSE PRACTITIONER

## 2021-03-04 PROCEDURE — 700101 HCHG RX REV CODE 250: Performed by: NURSE PRACTITIONER

## 2021-03-04 PROCEDURE — 700102 HCHG RX REV CODE 250 W/ 637 OVERRIDE(OP): Performed by: NURSE PRACTITIONER

## 2021-03-04 PROCEDURE — A9270 NON-COVERED ITEM OR SERVICE: HCPCS | Performed by: NURSE PRACTITIONER

## 2021-03-04 PROCEDURE — 770001 HCHG ROOM/CARE - MED/SURG/GYN PRIV*

## 2021-03-04 RX ADMIN — GABAPENTIN 300 MG: 300 CAPSULE ORAL at 12:03

## 2021-03-04 RX ADMIN — DIVALPROEX SODIUM 125 MG: 125 CAPSULE ORAL at 20:59

## 2021-03-04 RX ADMIN — CYCLOBENZAPRINE 10 MG: 10 TABLET, FILM COATED ORAL at 15:37

## 2021-03-04 RX ADMIN — RISPERIDONE 0.5 MG: 0.5 TABLET ORAL at 06:33

## 2021-03-04 RX ADMIN — GABAPENTIN 300 MG: 300 CAPSULE ORAL at 16:56

## 2021-03-04 RX ADMIN — OXYCODONE HYDROCHLORIDE 10 MG: 10 TABLET ORAL at 12:03

## 2021-03-04 RX ADMIN — AMLODIPINE BESYLATE 5 MG: 5 TABLET ORAL at 06:34

## 2021-03-04 RX ADMIN — DIVALPROEX SODIUM 125 MG: 125 CAPSULE ORAL at 06:33

## 2021-03-04 RX ADMIN — DIVALPROEX SODIUM 125 MG: 125 CAPSULE ORAL at 15:37

## 2021-03-04 RX ADMIN — GABAPENTIN 300 MG: 300 CAPSULE ORAL at 06:33

## 2021-03-04 RX ADMIN — OXYCODONE HYDROCHLORIDE 10 MG: 10 TABLET ORAL at 20:59

## 2021-03-04 RX ADMIN — RISPERIDONE 1 MG: 1 TABLET ORAL at 16:56

## 2021-03-04 ASSESSMENT — PAIN DESCRIPTION - PAIN TYPE
TYPE: ACUTE PAIN

## 2021-03-04 NOTE — PROGRESS NOTES
"Walked patient 500 feet around the unit. Slight limp in wounded leg, but when asked about pain, patient replied leg \"isn't too bad.\" Appeared to be in bright spirits as he was able to carry on a conversation and joked with me.      "

## 2021-03-04 NOTE — PROGRESS NOTES
Mobility Progress Note    Surgery patient: Yes  Date of surgery: 11/19  Ambulated 50 ft on day of surgery? (N/A if patient did not undergo surgery today): N/A  Number of times ambulated 50 feet or greater today: 3  Patient has been up to chair, edge of bed or HOB 90 degrees for all meals?: Yes  Goal met? (goal is ambulating at least 50 feet 2 times on day shift, one time on night shift): Yes  If patient did not meet mobility goal, why?: Patient is up self, ambulating in room and hallway.

## 2021-03-04 NOTE — CARE PLAN
Problem: Pain Management  Goal: Pain level will decrease to patient's comfort goal  Outcome: PROGRESSING AS EXPECTED   Pain is well managed on oral medication. Educate pt on non-pharmacological pain interventions.   Problem: Psychosocial Needs:  Goal: Level of anxiety will decrease  Outcome: PROGRESSING AS EXPECTED   Pt is forgetful, require frequent orientation to surroundings.

## 2021-03-05 PROCEDURE — A9270 NON-COVERED ITEM OR SERVICE: HCPCS | Performed by: NURSE PRACTITIONER

## 2021-03-05 PROCEDURE — 700102 HCHG RX REV CODE 250 W/ 637 OVERRIDE(OP): Performed by: NURSE PRACTITIONER

## 2021-03-05 PROCEDURE — 770001 HCHG ROOM/CARE - MED/SURG/GYN PRIV*

## 2021-03-05 PROCEDURE — 700111 HCHG RX REV CODE 636 W/ 250 OVERRIDE (IP): Performed by: NURSE PRACTITIONER

## 2021-03-05 RX ORDER — LIDOCAINE HYDROCHLORIDE 40 MG/ML
SOLUTION TOPICAL
Status: DISCONTINUED | OUTPATIENT
Start: 2021-03-05 | End: 2021-05-03 | Stop reason: HOSPADM

## 2021-03-05 RX ORDER — LIDOCAINE HYDROCHLORIDE 20 MG/ML
20 INJECTION, SOLUTION INFILTRATION; PERINEURAL
Status: DISCONTINUED | OUTPATIENT
Start: 2021-03-05 | End: 2021-04-01

## 2021-03-05 RX ADMIN — HYDROXYZINE HYDROCHLORIDE 25 MG: 50 TABLET, FILM COATED ORAL at 11:43

## 2021-03-05 RX ADMIN — GABAPENTIN 300 MG: 300 CAPSULE ORAL at 11:43

## 2021-03-05 RX ADMIN — DIVALPROEX SODIUM 125 MG: 125 CAPSULE ORAL at 06:13

## 2021-03-05 RX ADMIN — GABAPENTIN 300 MG: 300 CAPSULE ORAL at 06:13

## 2021-03-05 RX ADMIN — DIVALPROEX SODIUM 125 MG: 125 CAPSULE ORAL at 22:11

## 2021-03-05 RX ADMIN — AMLODIPINE BESYLATE 5 MG: 5 TABLET ORAL at 06:13

## 2021-03-05 RX ADMIN — ENOXAPARIN SODIUM 40 MG: 40 INJECTION SUBCUTANEOUS at 06:13

## 2021-03-05 RX ADMIN — CYCLOBENZAPRINE 10 MG: 10 TABLET, FILM COATED ORAL at 11:44

## 2021-03-05 RX ADMIN — RISPERIDONE 0.5 MG: 0.5 TABLET ORAL at 06:14

## 2021-03-05 RX ADMIN — RISPERIDONE 1 MG: 1 TABLET ORAL at 17:53

## 2021-03-05 RX ADMIN — OXYCODONE HYDROCHLORIDE 10 MG: 10 TABLET ORAL at 16:32

## 2021-03-05 RX ADMIN — GABAPENTIN 300 MG: 300 CAPSULE ORAL at 17:53

## 2021-03-05 RX ADMIN — DIVALPROEX SODIUM 125 MG: 125 CAPSULE ORAL at 16:31

## 2021-03-05 RX ADMIN — OXYCODONE HYDROCHLORIDE 10 MG: 10 TABLET ORAL at 06:13

## 2021-03-05 RX ADMIN — OXYCODONE HYDROCHLORIDE 10 MG: 10 TABLET ORAL at 23:01

## 2021-03-05 ASSESSMENT — PAIN DESCRIPTION - PAIN TYPE
TYPE: ACUTE PAIN

## 2021-03-05 NOTE — PROGRESS NOTES
[facility] Medicine Twice Weekly Progress Note    Date of Service  2/27/2021    Chief Complaint  Wound Infection    Hospital Course  Mr. Varela is a 65-year-old male with PMH alcohol dependence, tobacco dependence, psychiatric disorder, and HTN who presented to the emergency department 8/7/2020 with a left lower extremity wound infection. He was hospitalized at our facility in April and evaluated by orthopedic surgery who recommended wound care. Wound cultures at that time grew MSSA and Streptococcus. He had been seen at [facility] earlier that week and prescribed antibiotics, however he had not filled the prescription.  An ultrasound of that extremity was done and was negative for DVT.  He was treated for sepsis and completed an antibiotic course on 9/16/2020. He was also found to have head lice and underwent treatment for that.  A repeat wound culture was done on 9/28/2020 and grew Strep A and Proteus. Infectious disease was consulted and recommended a 5-day course of IV zosyn which was completed on 10/5/2020. On 10/12/2020 the wound care team noticed increased inflammation to the proximal part of the wound bed and switched from a vera flow wound VAC to a regular wound VAC.  ID was again consulted and on 10/14/2020 recommended to 7-day course of antibiotics with meropenem which was completed on 10/22/2020. Additionally, he was noted to have intermittent agitation so was started on risperdal, depakote, and gabapentin per psychiatric recommendations.  On 11/20/2020 he underwent left lower extremity debridement with wound VAC placement. Since then he has remained stable.  He has been deemed incapacitated to make medical decisions and guardianship was granted on 1/29/21. Placement will likely be in a group home.     Interval Problem Update  Patient lying in bed, easily aroused.  He is pleasant and cooperative.  He states pain is well managed on current regimen.  -Continue wound care per LPS for leg wound and  debriding as needed.   -Patient has good appetite, gaining appropriate weight  -Continue current plan of care    Consultants/Specialty  -LPS  -Psychiatry  -Infectious Disease    Code Status  Full Code    Disposition  guardianship granted 1/29/21. SW/CM may attempt to start working on placement.  Obtaining financials.    Review of Systems  Review of Systems   Unable to perform ROS: Mental acuity        Physical Exam  Temp:  [36.3 °C (97.3 °F)-36.8 °C (98.2 °F)] 36.4 °C (97.6 °F)  Pulse:  [60-84] 72  Resp:  [16-17] 17  BP: (100-125)/(51-84) 111/74  SpO2:  [92 %-94 %] 94 %    Physical Exam  Vitals and nursing note reviewed.   HENT:      Head: Normocephalic and atraumatic.      Nose: Nose normal.   Eyes:      Conjunctiva/sclera: Conjunctivae normal.      Pupils: Pupils are equal, round, and reactive to light.   Cardiovascular:      Rate and Rhythm: Normal rate and regular rhythm.      Heart sounds: No murmur. No friction rub.   Pulmonary:      Effort: No respiratory distress.   Abdominal:      General: Bowel sounds are normal. There is no distension.      Palpations: Abdomen is soft.   Musculoskeletal:      Cervical back: Neck supple.      Comments: LLE compression wrap dressing with negative pressure wound VAC CDI   Skin:     General: Skin is warm and dry.   Neurological:      Mental Status: He is alert. He is confused.   Psychiatric:         Attention and Perception: Attention normal.         Mood and Affect: Mood normal.         Speech: Speech normal.         Thought Content: Thought content is delusional.         Fluids    Intake/Output Summary (Last 24 hours) at 3/4/2021 1907  Last data filed at 3/4/2021 1000  Gross per 24 hour   Intake 240 ml   Output --   Net 240 ml       Laboratory                        Imaging  None recent    Assessment/Plan  * Wound of left leg- (present on admission)  Assessment & Plan  -Wound vac - he intermittently dislodges. Requires ongoing education  -Further management per wound  care/LPS/ortho.   -Pain control as needed  -Improving slowly overall    Encephalopathy- (present on admission)  Assessment & Plan  -Improved   -Per psychiatry and Dr. Velázquez on 1/11: Patient is incapacitated.  -Public Guardian: Coretat Benjamin   -Attempting placement; possibly [facility]    Psychiatric disorder- (present on admission)  Assessment & Plan  -Unspecified.  -Continue Risperdal and Depakote.  Stable on current treatment.  -Psychiatry has consulted and deemed him incapacitated to leave AMA or make medical decisions.  -Initial cognitive evaluation 8/20.   -SLP repeated Cognitive eval 1/2021- continue to recommend 24/7 supervision   -Public Guardian: Coretta Jenkins   -Pending GH; obtaining financial status    Essential hypertension- (present on admission)  Assessment & Plan  -Well controlled on amlodipine    Emphysema/COPD (HCC)- (present on admission)  Assessment & Plan  -Chronic and stable off medication, without acute exacerbation    Protein malnutrition (HCC)- (present on admission)  Assessment & Plan  -Body mass index is 21.35 kg/m².   -Continue TID supplements.  -Encourage PO intake  resolving    Flexion contracture of knee, 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)  Assessment & Plan  -Secondary to chronic wound in that area.  -PT/OT.  -Does not seem to limit his mobility.   - Is frequently ambulatory.    Infection of wound due to methicillin resistant Staphylococcus aureus (MRSA)- (present on admission)  Assessment & Plan  -History of MRSA.  -Poor compliance with OP wound care. Poor compliance with wound vac at times.   -Continue pain control as needed.  -LPS and wound care following - debridements and wound VAC changes per their recommendations  -Cultures repeated 12/14 - positive for Proteus species, pan sensitive.  Completed regimen of augmentin.   -resolved       VTE prophylaxis: Ambulatory     I have performed a physical exam and reviewed and updated ROS and Assessment/Plan today 2/27/2021. In review of the  previous note there are no changes except as documented above    I certify the patient requires continued medically necessary hospital services for the treatment of non-healing wound and cognitive disorder.  The patient will remain in the hospital for the foreseeable future.  Discharge may or may not occur in the next 20 days due to ongoing discharge delays due to having no medically acceptable discharge options.    LOUISE Hull.

## 2021-03-05 NOTE — CARE PLAN
Problem: Infection  Goal: Will remain free from infection  Outcome: PROGRESSING AS EXPECTED   Wound vac in place and working properly. Educate pt on the purpose of his wound vac.   Problem: Pain Management  Goal: Pain level will decrease to patient's comfort goal  Outcome: PROGRESSING AS EXPECTED   Assess pain level regularly, pain is well control on oral medications.

## 2021-03-05 NOTE — CARE PLAN
Problem: Venous Thromboembolism (VTW)/Deep Vein Thrombosis (DVT) Prevention:  Goal: Patient will participate in Venous Thrombosis (VTE)/Deep Vein Thrombosis (DVT)Prevention Measures  Outcome: PROGRESSING AS EXPECTED  Intervention: Encourage ambulation/mobilization at level directed by Physical Therapy in collaboration with Interdisciplinary Team  Note: Frequent ambulation in room and hallways. Lovenox ordered for anticoagulation.      Problem: Pain Management  Goal: Pain level will decrease to patient's comfort goal  Outcome: PROGRESSING AS EXPECTED  Note: Pain medications administered as needed with good effect. Pt reaches goal range.

## 2021-03-06 PROCEDURE — 700102 HCHG RX REV CODE 250 W/ 637 OVERRIDE(OP): Performed by: NURSE PRACTITIONER

## 2021-03-06 PROCEDURE — 700111 HCHG RX REV CODE 636 W/ 250 OVERRIDE (IP): Performed by: NURSE PRACTITIONER

## 2021-03-06 PROCEDURE — 97606 NEG PRS WND THER DME>50 SQCM: CPT

## 2021-03-06 PROCEDURE — A9270 NON-COVERED ITEM OR SERVICE: HCPCS | Performed by: NURSE PRACTITIONER

## 2021-03-06 PROCEDURE — 700101 HCHG RX REV CODE 250: Performed by: NURSE PRACTITIONER

## 2021-03-06 PROCEDURE — 770001 HCHG ROOM/CARE - MED/SURG/GYN PRIV*

## 2021-03-06 RX ADMIN — LIDOCAINE 1 PATCH: 50 PATCH CUTANEOUS at 12:18

## 2021-03-06 RX ADMIN — DIVALPROEX SODIUM 125 MG: 125 CAPSULE ORAL at 12:50

## 2021-03-06 RX ADMIN — AMLODIPINE BESYLATE 5 MG: 5 TABLET ORAL at 06:41

## 2021-03-06 RX ADMIN — ENOXAPARIN SODIUM 40 MG: 40 INJECTION SUBCUTANEOUS at 06:40

## 2021-03-06 RX ADMIN — OXYCODONE HYDROCHLORIDE 10 MG: 10 TABLET ORAL at 06:41

## 2021-03-06 RX ADMIN — OXYCODONE HYDROCHLORIDE 10 MG: 10 TABLET ORAL at 22:35

## 2021-03-06 RX ADMIN — GABAPENTIN 300 MG: 300 CAPSULE ORAL at 12:18

## 2021-03-06 RX ADMIN — CYCLOBENZAPRINE 10 MG: 10 TABLET, FILM COATED ORAL at 22:35

## 2021-03-06 RX ADMIN — RISPERIDONE 0.5 MG: 0.5 TABLET ORAL at 06:41

## 2021-03-06 RX ADMIN — HYDROXYZINE HYDROCHLORIDE 25 MG: 50 TABLET, FILM COATED ORAL at 22:35

## 2021-03-06 RX ADMIN — DIVALPROEX SODIUM 125 MG: 125 CAPSULE ORAL at 06:41

## 2021-03-06 RX ADMIN — GABAPENTIN 300 MG: 300 CAPSULE ORAL at 06:41

## 2021-03-06 RX ADMIN — DIVALPROEX SODIUM 125 MG: 125 CAPSULE ORAL at 22:35

## 2021-03-06 RX ADMIN — OXYCODONE HYDROCHLORIDE 10 MG: 10 TABLET ORAL at 12:50

## 2021-03-06 RX ADMIN — RISPERIDONE 1 MG: 1 TABLET ORAL at 16:55

## 2021-03-06 RX ADMIN — GABAPENTIN 300 MG: 300 CAPSULE ORAL at 16:55

## 2021-03-06 ASSESSMENT — PAIN DESCRIPTION - PAIN TYPE
TYPE: ACUTE PAIN

## 2021-03-06 NOTE — WOUND TEAM
[facility]  Inpatient Services  Wound and Skin Care Progress Note    Admission Date: 8/7/2020     Last order of IP CONSULT TO WOUND CARE was found on 9/1/2020 from Hospital Encounter on 8/7/2020     HPI, PMH, SH: Reviewed    Unit where seen by Wound Team: [id number]    WOUND CONSULT/FOLLOW UP RELATED TO:  Left leg scheduled negative pressure wound therapy (npwt) dressing change and 2 layer compression wrap change    OBJECTIVE: NPWT dressing and 2 layer compression wrap in place. Patient on pressure redistribution mattress, up self, ambulates frequently, turns self.    WOUND TYPE, LOCATION, CHARACTERISTICS (Pressure Injuries: location, stage, POA or date identified)      Skin Risk/[place]   Sensory Perception: Slightly Limited  Moisture: Rarely Moist  Activity: Walks Frequently  Mobility: Slightly Limited  Nutrition: Adequate  Friction and Shear: No Apparent Problem  Total Score: 20  Skin Breakdown Risk: 18-23 Minimal Risk for Skin Breakdown    Sensory Interventions  Bed Types: Pressure Redistribution Mattress (Atmosair)  Skin Preventative Measures: Pillows in Use for Support / Positioning, Pillows in Use to Float Heels  Skin Preventative Dressing: Mepilex  Moisturizers/Barriers: Moisturizer   PT / OT Involved in Care: Occupational Therapy Involved, Physical Therapy Involved  Activity : Ambulated  Patient Turns / Repositioning: Patient Turns Self from Side to Side  Patient is Receiving Nutrition: Oral Intake Adequate  Nutrition Consult Ordered: No, Consult has Already been Placed  Vitamin Therapy in Use: No  Friction Interventions: Draw Sheet / Pad Used for Repositioning                Negative Pressure Wound Therapy 11/20/20 Leg Lateral;Posterior;Medial Left (Active)   Vacuum Serial Number [id number] 03/01/21 1000   NPWT Pump Mode / Pressure Setting Continuous;150 mmHg 03/06/21 1000   Dressing Type Medium;Black Foam (Regular) 03/06/21 1000   Number of Foam Pieces Used 3 03/03/21 1000   Canister Changed No  03/06/21 1000   Output (mL) 20 mL 02/22/21 0715   NEXT Dressing Change/Treatment Date 03/05/21 03/04/21 0930   WOUND NURSE ONLY - Time Spent with Patient (mins) 120 02/24/21 0900           Wound 11/19/20 Full Thickness Wound Leg Lateral;Posterior;Medial Left (Active)   Wound Image    02/27/21 1033   Site Assessment Red;Early/partial granulation 03/06/21 1300   Periwound Assessment Scar tissue;Denuded 03/06/21 1300   Margins Attached edges 03/06/21 1300   Closure RONNI 03/06/21 1000   Drainage Amount Small 03/06/21 1300   Drainage Description Serosanguineous 03/06/21 1300   Treatments Site care;Cleansed;CSWD - Conservative Sharp Wound Debridement;Compression 03/06/21 1300   Wound Cleansing Approved Wound Cleanser 03/06/21 1300   Periwound Protectant Hydrocolloid;Skin Protectant Wipes to Periwound 03/06/21 1300   Dressing Cleansing/Solutions Not Applicable 03/06/21 1000   Dressing Options Collagen Dressing;Wound Vac 03/06/21 1300   Dressing Changed Changed 03/06/21 1300   Dressing Status Clean;Dry;Intact 03/06/21 1000   Dressing Change/Treatment Frequency Monday, Wednesday, Friday, and As Needed 03/06/21 1300   NEXT Dressing Change/Treatment Date 03/08/21 03/06/21 1300   NEXT Weekly Photo (Inpatient Only) 03/13/21 03/06/21 1300   Non-staged Wound Description Full thickness 03/06/21 1300   Wound Length (cm) 12.6 cm 02/22/21 0830   Wound Width (cm) 3 cm 02/22/21 0830   Wound Depth (cm) 0.3 cm 02/22/21 0830   Wound Surface Area (cm^2) 37.8 cm^2 02/22/21 0830   Wound Volume (cm^3) 11.34 cm^3 02/22/21 0830   Post-Procedure Length (cm) 15 cm 02/03/21 1100   Post-Procedure Width (cm) 3.4 cm 02/03/21 1100   Post-Procedure Depth (cm) 0.2 cm 02/03/21 1100   Post-Procedure Surface Area (cm^2) 51 cm^2 02/03/21 1100   Post-Procedure Volume (cm^3) 10.2 cm^3 02/03/21 1100   Wound Healing % -11 02/22/21 0830   Wound Bed Granulation (%) 60 % 03/06/21 1300   Wound Bed Slough (%) 10 % 02/27/21 1100   Wound Bed Granulation (%) -  Post-Procedure 100 % 02/03/21 1100   Tunneling (cm) 0 cm 02/10/21 1400   Undermining (cm) 0 cm 02/10/21 1400   Shape irregular 03/06/21 1300   Wound Odor Foul 03/06/21 1300   Pulses Left;2+;DP;PT 03/03/21 1000   Exposed Structures None 03/06/21 1300   WOUND NURSE ONLY - Time Spent with Patient (mins) 60 03/03/21 1000           Lab Values:    Lab Results   Component Value Date/Time    WBC 6.5 11/22/2020 02:44 AM    RBC 3.54 (L) 11/22/2020 02:44 AM    HEMOGLOBIN 9.9 (L) 11/22/2020 02:44 AM    HEMATOCRIT 30.5 (L) 11/22/2020 02:44 AM    CREACTPROT 1.07 (H) 08/12/2020 01:55 PM    SEDRATEWES 85 (H) 08/07/2020 01:20 PM    HBA1C 5.7 (H) 11/09/2018 06:30 PM      Culture Results show:  Recent Results (from the past 720 hour(s))   CULTURE WOUND W/ GRAM STAIN    Collection Time: 09/01/20  2:00 PM    Specimen: Left Leg; Wound   Result Value Ref Range    Significant Indicator POS (POS)     Source WND     Site LEFT LEG     Culture Result - (A)     Gram Stain Result       Moderate Gram positive cocci.  Moderate Gram positive rods.      Culture Result (A)      Beta Hemolytic Streptococcus group A  Moderate growth      Culture Result (A)      Methicillin Resistant Staphylococcus aureus  Moderate growth      Culture Result (A)      Diphtheroids  Moderate growth  Mixed morphologies.     KOSTAS: 9/20/21   Left.    Doppler waveforms of the common femoral artery are of high amplitude and    triphasic.    Doppler waveforms at the ankle are brisk and triphasic.    Ankle-brachial index is normal.    Unable to obtained popliteal waveforms due to wound bandages.     Self Report / Pain Level:  lidocaine to VAC sponge dwelled 12 minutes     INTERVENTIONS BY WOUND TEAM: INTERVENTIONS BY WOUND TEAM:  Performed standard wound care which includes appropriate positioning, dressing removal and non-selective debridement. Pictures and measurements obtained weekly if/when required.  Cleansed: Wound cleanser and gauze used to clean wound beds and  periwound  Debridement: sharp debridement yellow Slough  using Curette <20 cm2 debrided by Belen wound RN. Periwound - Benzoin, hydrocolloid used around entire addison and to form bridge  Primary - Agata to wound beds, 2 piece of black foam half thickness cut to fit and placed to each wound bed, 1 black piece used to bridge and form button under TRAC pad- all secured with drape, restarted NPWT 150mmHg, no leaks noted.   Secondary - 2 layer compression wrap foot/leg/thigh     Interdisciplinary consultation: Patient, Bedside RN    EVALUATION / RATIONALE FOR TREATMENT:  3/6/12 area unchanged, odor persists  3/1/21: Lidocaine unavailable during this dressing change. Small amount of debridement completed with curette. Continued with silver powder for its antimicrobial properties.   2/27/21 skin bridge enlarging  2/24/2021: Excisional debridement by LPS APRN performed for rolled edges that were starting to form along the posterior and medial left leg wound. Moderate bleeding managed by administration of lidocaine injection. CSWD performed for slough. Wound bed is beefy red following debridement. Resume agata and silver foam to manage bioburden. Continue with CSWD with each dressing change.   2/22/2021: Rolled edges starting to form along posterior thigh wound bed - may require excisional debridement by provider. Minimal changes to actual wound bed. Continue CSWD, agata, and silver foam to manage bioburden.   2/19/21: minimal to no changes from last assessment. Continue CSWD, agata, and silver foam for bioburden.  2/17/2021: Periwound remains intact. Adherent slough noted over wound bed. Scant bleeding noted on medial wound following CSWD. Continue with Agata over wound beds to further stimulate epithelization. Continue with silver foam to manage bioburden.   2/12/2021:periwound intact does not appear macerated.  Moderate amount of drainage. Wound bed debrided.   Picture frame draped over periwound, three silver foams  used.  Continue NPWT and two layer compression.    2/10/21: Periwound looks less denuded today. Did not place the arglaes powder to wound bed - switching to Vanessa to see if it will stimulate healing in his wound bed. Did not use hydrofiber to periwound but it is in the room for next visit in case it is needed. Ordered and used liquid lidocaine to remove vac dressing and lidocaine gel to the posterior proximal part of wound. Patient tolerated debridement well with lidocaine.   02/05/21: Addison wound is denuded and wet, vac drape lifting under wrap, Aquacel to dry wet and denuded skin, no silver foam available this dressing change.  02/03/21: Measurements are smaller for both medial and posterior thigh.  Wound bed has granulation and non-viable slough.  Will continue to debride every time with wound VAC changes to decrease bioburden in wound bed.   02/01/21:  No change.  Wounds appear to be improving slowly.      01/29/2021: wounds flush with periwound, fully granulated with scant yellow to a few areas. Medial periwound intact, lateral periwound with some erythema. LPS APRN suggested that next time applying brava strips to any irritated periwound may help reduce irritation.   1/27/21: Restarted NPWT, addison-wound looks better.   1/25/21: denuded tissue, vac vacation for until 1/27.  Re-assessment to place wound VAC to left LE.   1/21/21 area has decreased since last measurements.  Odor still present.  Addison skin compromised probably yeast infection under VAC drape.  Will hold VAC for 72 hours and re assess.  Nystatin to address yeast and dry out addison skin  1/15/21: position of leg may affect posterior thigh measurements.  Medial Leg wound area decreased.    01/10/2021: Wound vac replaced, patient accidentally removed vac and bedside RN was unable to repair.  Patient tolerated wound VAC placement. Patient had moderate ss with some green tinge drainage.   01/04/2021: 2 layer compression wrap re-applied to left LE due to ACE  wrap leaving too much indentation to left LE.   12/31/2020 thigh wound much narrower.  Biofilm redeveloping and odor still present.   12/27/20: Wound bed granular and odor has lessen but still persists.   12/18/20 wound length decreased.  Odor persists.    12/14/2020: re-cultured wound bed.  12/11/2020 POD 23 Length of wounds has decreased, width has not changed.  Odor persists.  Pt continues to become angry with VAC when it limits his mobility.    12/7/2020 POD 19 odor persists, improved wound bed after debridement.  Possible bacterial vs yeast vs fungal infection.  Culture taken.  Nystatin to treat possible fungal infection, silver foam to decrease microbial population.    12/4/2020 POD# 16 odor worsening, more yellow tissue present, will add nystatin and silver foam next week and consider a culture  11/30 POD#11 granular buds visible to wound bed.  Same as prior.   11/27 POD#8 wound is odorous likely related to biologic dressing.  No overt signs of infection.  Granular buds are visible through adaptic.  Edges do not appear as thick as they were prior to last sx.  Continue with current treatment.    11/23: POD#4 s/p I&D of left LE wounds and biologic placed by Dr. Olvera on 11/19.  Wound bed is flatter and raised edges scar tissue seems to be gone.   11/14 Posterior thigh aspect of wound deteriorating, will increase frequency of treatment to keep biofilm down and hopefully avoid systemic abx.  Will include thigh in compression wrap.  Will consider culture if improvement is not seen in the next few treatments.    11/10: APRN unavailable at this time.  Will re-schedule excisional debridement to next week.   11/5: Plan for debridement of scarred edges on Tues by LPS. Tendon exposed to posterior aspect of wound, behind knee. Continue with current dressing care.  10/29: Excisional debridement by Asaf ELLER of scar tissue along the proximal edge of the posterior knee and lateral thigh.  Lateral aspect of thigh  is flatten.  Medial aspect of knee has thick scar tissue that was excisionally debrided by Asaf ELLER.  Fully granulated throughout the wound bed.   10/23 wound bed mostly granular, superficial in depth, progress has been slow with the wound VAC so will trial collagen product to encourage new tissue growth.    10/19: wound bed has improved in color and is fully granulated.  Addison-wound has improved, denudation has resolved. APRN continuing with serial weekly debridements as needed.   10/16: wound friable pt now on iv abx per ID.  Indurated edges addressed with drape and foam.  Addison wound likely has yeast infection - addressing with silver powder crusting  10/14: patient seen with Asaf ELLER, stopping veraflo instillation.  Starting silver powder and regular wound VAC to see if it will help with bioburden.  Possible colonization of bacteria.  ID involved.   10/12: Inflammation noted to the proximal part of the wound bed.  Will continue to monitor, possible vac break on Wednesday to help addison-skin inflammation.   10/7: Excisional debridement performed by Asaf ELLER. Will need to continue selective debridement with NPWT changes, and weekly excisional debridement with APRN to flatten out the scar tissue.  IV abx therapy ended 10/5.   10/5: Lateral wound still with some slough, both wounds smaller per measurements. Medial wound with all granular tissue.  9/30: Patient to start abx therapy for 7 days course per Dr. Ellis.  Started dakin's instillation to veraflo  9/28: malodorous today, culture taken.    9/25: Odor noted, though unclear if from wound or pt, consider shower before next Vac change. Consider regular vac next change, wound granular and superficial.   9/23: Patient still awaiting guardianship for placement.   9/18: wound granulating nicely and responding well to current treatment.    9/16: Wound bed with granular tissue, edges are thick but appear better than previous assessments. .  NPWT VF to encourage closure by secondary intention and manage drainage while encouraging oxygenation and granulation to the wound bed. 2 layer compression to assist in decrease of swelling and encourage blood flow to wounds. Wound team to follow up and change 3x/week.      Goals: Steady decrease in wound area and depth weekly.    NURSING PLAN OF CARE ORDERS (X):    Dressing changes: See Dressing Care orders: X  Skin care: See Skin Care orders:   Rectal tube care: See Rectal Tube Care orders:   Other orders:      WOUND TEAM PLAN OF CARE:   Dressing changes by wound team:        Follow up 3 times weekly:                NPWT change 3 times weekly:  X,  NPWT changes 3x/ weekly with 2 layer compression wrap.  Follow up 1-2 times weekly:      Follow up Bi-Monthly:                   Follow up as needed:       Other (explain):     Anticipated discharge plans:  LTACH:        SNF/Rehab: X - patient will need ongoing wound care for LLE upon discharge - 3x/weekly           Home Health Care:           Outpatient Wound Center:            Self Care:

## 2021-03-06 NOTE — CARE PLAN
Problem: Pain Management  Goal: Pain level will decrease to patient's comfort goal  Outcome: PROGRESSING AS EXPECTED  Note: Educated about the pain scale and non pharmacological pain methods, check the MAR     Problem: Mobility  Goal: Risk for activity intolerance will decrease  Outcome: PROGRESSING AS EXPECTED  Note: Educated about the strict bed rest order for now, pt. Repositioning in bed fine

## 2021-03-06 NOTE — PROGRESS NOTES
Mobility Progress Note    Surgery patient: Yes  Date of surgery: 11/19/2020  Ambulated 50 ft on day of surgery? (N/A if patient did not undergo surgery today): n/a  Number of times ambulated 50 feet or greater today: 4  Patient has been up to chair, edge of bed or HOB 90 degrees for all meals?: Yes  Goal met? (goal is ambulating at least 50 feet 2 times on day shift, one time on night shift): Yes  If patient did not meet mobility goal, why?: n/a

## 2021-03-06 NOTE — CARE PLAN
Problem: Safety  Goal: Will remain free from injury  Outcome: PROGRESSING AS EXPECTED     Problem: Venous Thromboembolism (VTW)/Deep Vein Thrombosis (DVT) Prevention:  Goal: Patient will participate in Venous Thrombosis (VTE)/Deep Vein Thrombosis (DVT)Prevention Measures  Outcome: PROGRESSING AS EXPECTED     Problem: Psychosocial Needs:  Goal: Level of anxiety will decrease  Outcome: PROGRESSING AS EXPECTED     Problem: Mobility  Goal: Risk for activity intolerance will decrease  Outcome: PROGRESSING AS EXPECTED

## 2021-03-07 PROCEDURE — A9270 NON-COVERED ITEM OR SERVICE: HCPCS | Performed by: NURSE PRACTITIONER

## 2021-03-07 PROCEDURE — 700102 HCHG RX REV CODE 250 W/ 637 OVERRIDE(OP): Performed by: NURSE PRACTITIONER

## 2021-03-07 PROCEDURE — 700111 HCHG RX REV CODE 636 W/ 250 OVERRIDE (IP): Performed by: NURSE PRACTITIONER

## 2021-03-07 PROCEDURE — 770001 HCHG ROOM/CARE - MED/SURG/GYN PRIV*

## 2021-03-07 PROCEDURE — 99231 SBSQ HOSP IP/OBS SF/LOW 25: CPT | Performed by: NURSE PRACTITIONER

## 2021-03-07 PROCEDURE — 700101 HCHG RX REV CODE 250: Performed by: NURSE PRACTITIONER

## 2021-03-07 RX ORDER — OXYCODONE HYDROCHLORIDE 5 MG/1
5 TABLET ORAL EVERY 6 HOURS PRN
Status: DISCONTINUED | OUTPATIENT
Start: 2021-03-07 | End: 2021-04-02

## 2021-03-07 RX ORDER — MORPHINE SULFATE 15 MG/1
15 TABLET, FILM COATED, EXTENDED RELEASE ORAL EVERY 12 HOURS
Status: DISCONTINUED | OUTPATIENT
Start: 2021-03-07 | End: 2021-05-03 | Stop reason: HOSPADM

## 2021-03-07 RX ADMIN — LIDOCAINE 1 PATCH: 50 PATCH CUTANEOUS at 11:53

## 2021-03-07 RX ADMIN — GABAPENTIN 300 MG: 300 CAPSULE ORAL at 05:41

## 2021-03-07 RX ADMIN — HYDROXYZINE HYDROCHLORIDE 25 MG: 50 TABLET, FILM COATED ORAL at 20:35

## 2021-03-07 RX ADMIN — RISPERIDONE 1 MG: 1 TABLET ORAL at 16:07

## 2021-03-07 RX ADMIN — MORPHINE SULFATE 15 MG: 15 TABLET, FILM COATED, EXTENDED RELEASE ORAL at 17:12

## 2021-03-07 RX ADMIN — HYDROXYZINE HYDROCHLORIDE 25 MG: 50 TABLET, FILM COATED ORAL at 17:12

## 2021-03-07 RX ADMIN — GABAPENTIN 300 MG: 300 CAPSULE ORAL at 11:53

## 2021-03-07 RX ADMIN — ENOXAPARIN SODIUM 40 MG: 40 INJECTION SUBCUTANEOUS at 05:41

## 2021-03-07 RX ADMIN — DIVALPROEX SODIUM 125 MG: 125 CAPSULE ORAL at 05:41

## 2021-03-07 RX ADMIN — DIVALPROEX SODIUM 125 MG: 125 CAPSULE ORAL at 20:35

## 2021-03-07 RX ADMIN — DIVALPROEX SODIUM 125 MG: 125 CAPSULE ORAL at 14:20

## 2021-03-07 RX ADMIN — AMLODIPINE BESYLATE 5 MG: 5 TABLET ORAL at 05:41

## 2021-03-07 RX ADMIN — RISPERIDONE 0.5 MG: 0.5 TABLET ORAL at 05:41

## 2021-03-07 RX ADMIN — OXYCODONE HYDROCHLORIDE 10 MG: 10 TABLET ORAL at 14:21

## 2021-03-07 RX ADMIN — OXYCODONE 5 MG: 5 TABLET ORAL at 20:35

## 2021-03-07 RX ADMIN — OXYCODONE HYDROCHLORIDE 10 MG: 10 TABLET ORAL at 08:10

## 2021-03-07 RX ADMIN — GABAPENTIN 300 MG: 300 CAPSULE ORAL at 16:07

## 2021-03-07 ASSESSMENT — PAIN DESCRIPTION - PAIN TYPE
TYPE: ACUTE PAIN
TYPE: ACUTE PAIN

## 2021-03-07 NOTE — CARE PLAN
Problem: Safety  Goal: Will remain free from injury  Outcome: PROGRESSING AS EXPECTED  Goal: Will remain free from falls  Description: Pt mobilizes frequently independently.   Outcome: PROGRESSING AS EXPECTED  Note: Safety precautions in place. Call light within reach. Bed is low and in locked position. Hourly rounding in place.       Problem: Discharge Barriers/Planning  Goal: Patient's continuum of care needs will be met  Outcome: PROGRESSING AS EXPECTED  Intervention: Assess potential discharge barriers on admission and throughout hospital stay  Note: RN case manager assisting with discharge planning

## 2021-03-07 NOTE — PROGRESS NOTES
Mobility Progress Note     Surgery patient: Yes  Date of surgery: 11/19/2020  Ambulated 50 ft on day of surgery? (N/A if patient did not undergo surgery today): n/a  Number of times ambulated 50 feet or greater today: 5  Patient has been up to chair, edge of bed or HOB 90 degrees for all meals?: Yes  Goal met? (goal is ambulating at least 50 feet 2 times on day shift, one time on night shift): Yes  If patient did not meet mobility goal, why?: n/a

## 2021-03-07 NOTE — CARE PLAN
Problem: Safety  Goal: Will remain free from injury  Outcome: PROGRESSING AS EXPECTED  Goal: Will remain free from falls  Description: Pt mobilizes frequently independently.   Outcome: PROGRESSING AS EXPECTED     Problem: Infection  Goal: Will remain free from infection  Outcome: PROGRESSING AS EXPECTED

## 2021-03-08 PROCEDURE — A9270 NON-COVERED ITEM OR SERVICE: HCPCS | Performed by: NURSE PRACTITIONER

## 2021-03-08 PROCEDURE — 700102 HCHG RX REV CODE 250 W/ 637 OVERRIDE(OP): Performed by: NURSE PRACTITIONER

## 2021-03-08 PROCEDURE — 97606 NEG PRS WND THER DME>50 SQCM: CPT

## 2021-03-08 PROCEDURE — 770001 HCHG ROOM/CARE - MED/SURG/GYN PRIV*

## 2021-03-08 PROCEDURE — 700111 HCHG RX REV CODE 636 W/ 250 OVERRIDE (IP): Performed by: NURSE PRACTITIONER

## 2021-03-08 PROCEDURE — 700101 HCHG RX REV CODE 250: Performed by: NURSE PRACTITIONER

## 2021-03-08 RX ADMIN — RISPERIDONE 1 MG: 1 TABLET ORAL at 16:42

## 2021-03-08 RX ADMIN — RISPERIDONE 0.5 MG: 0.5 TABLET ORAL at 04:56

## 2021-03-08 RX ADMIN — OXYCODONE 5 MG: 5 TABLET ORAL at 02:33

## 2021-03-08 RX ADMIN — GABAPENTIN 300 MG: 300 CAPSULE ORAL at 16:42

## 2021-03-08 RX ADMIN — AMLODIPINE BESYLATE 5 MG: 5 TABLET ORAL at 04:55

## 2021-03-08 RX ADMIN — MORPHINE SULFATE 15 MG: 15 TABLET, FILM COATED, EXTENDED RELEASE ORAL at 16:42

## 2021-03-08 RX ADMIN — GABAPENTIN 300 MG: 300 CAPSULE ORAL at 13:06

## 2021-03-08 RX ADMIN — CYCLOBENZAPRINE 10 MG: 10 TABLET, FILM COATED ORAL at 10:51

## 2021-03-08 RX ADMIN — OXYCODONE 5 MG: 5 TABLET ORAL at 15:03

## 2021-03-08 RX ADMIN — MORPHINE SULFATE 15 MG: 15 TABLET, FILM COATED, EXTENDED RELEASE ORAL at 04:55

## 2021-03-08 RX ADMIN — DIVALPROEX SODIUM 125 MG: 125 CAPSULE ORAL at 04:55

## 2021-03-08 RX ADMIN — DIVALPROEX SODIUM 125 MG: 125 CAPSULE ORAL at 13:06

## 2021-03-08 RX ADMIN — LIDOCAINE 1 PATCH: 50 PATCH CUTANEOUS at 13:06

## 2021-03-08 RX ADMIN — OXYCODONE 5 MG: 5 TABLET ORAL at 23:07

## 2021-03-08 RX ADMIN — ENOXAPARIN SODIUM 40 MG: 40 INJECTION SUBCUTANEOUS at 04:56

## 2021-03-08 RX ADMIN — OXYCODONE 5 MG: 5 TABLET ORAL at 09:30

## 2021-03-08 RX ADMIN — GABAPENTIN 300 MG: 300 CAPSULE ORAL at 04:55

## 2021-03-08 RX ADMIN — DIVALPROEX SODIUM 125 MG: 125 CAPSULE ORAL at 23:07

## 2021-03-08 ASSESSMENT — COGNITIVE AND FUNCTIONAL STATUS - GENERAL
DAILY ACTIVITIY SCORE: 23
MOBILITY SCORE: 24
HELP NEEDED FOR BATHING: A LITTLE
SUGGESTED CMS G CODE MODIFIER MOBILITY: CH
SUGGESTED CMS G CODE MODIFIER DAILY ACTIVITY: CI

## 2021-03-08 ASSESSMENT — PAIN DESCRIPTION - PAIN TYPE
TYPE: ACUTE PAIN
TYPE: ACUTE PAIN

## 2021-03-08 NOTE — PROGRESS NOTES
Mobility Progress Note     Surgery patient: Yes  Date of surgery: 11/19/2020  Ambulated 50 ft on day of surgery? (N/A if patient did not undergo surgery today): Yes  Number of times ambulated 50 feet or greater today: 5  Patient has been up to chair, edge of bed or HOB 90 degrees for all meals?: Yes  Goal met? (goal is ambulating at least 50 feet 2 times on day shift, one time on night shift): Yes  If patient did not meet mobility goal, why?: n/a

## 2021-03-08 NOTE — WOUND TEAM
[facility]  Inpatient Services  Wound and Skin Care Progress Note    Admission Date: 8/7/2020     Last order of IP CONSULT TO WOUND CARE was found on 9/1/2020 from Hospital Encounter on 8/7/2020     HPI, PMH, SH: Reviewed    Unit where seen by Wound Team: [id number]    WOUND CONSULT/FOLLOW UP RELATED TO:  Left leg scheduled negative pressure wound therapy (npwt) dressing change and 2 layer compression wrap change    OBJECTIVE: NPWT dressing and 2 layer compression wrap in place. Patient on pressure redistribution mattress, up self, ambulates frequently, turns self.    WOUND TYPE, LOCATION, CHARACTERISTICS (Pressure Injuries: location, stage, POA or date identified)     Negative Pressure Wound Therapy 11/20/20 Leg Lateral;Posterior;Medial Left (Active)   Vacuum Serial Number [id number] 03/01/21 1000   NPWT Pump Mode / Pressure Setting Continuous;150 mmHg 03/08/21 1038   Dressing Type Medium;Black Foam (Regular) 03/08/21 1038   Number of Foam Pieces Used 3 03/08/21 1038   Canister Changed No 03/08/21 1038   Output (mL) 20 mL 02/22/21 0715   NEXT Dressing Change/Treatment Date 03/10/21 03/08/21 1038   WOUND NURSE ONLY - Time Spent with Patient (mins) 60 03/08/21 1038           Wound 11/19/20 Full Thickness Wound Leg Lateral;Posterior;Medial Left (Active)   Wound Image   03/06/21 1300   Site Assessment Pink;Red;Early/partial granulation 03/08/21 1038   Periwound Assessment Clean;Dry;Intact;Scar tissue 03/08/21 1038   Margins Attached edges 03/08/21 1038   Closure Secondary intention 03/08/21 1038   Drainage Amount Moderate 03/08/21 1038   Drainage Description Serosanguineous 03/08/21 1038   Treatments Cleansed;Irrigation;CSWD - Conservative Sharp Wound Debridement;Site care;Compression 03/08/21 1038   Wound Cleansing Approved Wound Cleanser 03/08/21 1038   Periwound Protectant Skin Protectant Wipes to Periwound;Hydrocolloid;Drape 03/08/21 1038   Dressing Cleansing/Solutions Not Applicable 03/08/21 1038    Dressing Options Collagen Dressing;Wound Vac;Compression Wrap Two Layer 03/08/21 1038   Dressing Changed Changed 03/08/21 1038   Dressing Status Clean;Dry;Intact 03/08/21 1038   Dressing Change/Treatment Frequency Monday, Wednesday, Friday, and As Needed 03/08/21 1038   NEXT Dressing Change/Treatment Date 03/10/21 03/08/21 1038   NEXT Weekly Photo (Inpatient Only) 03/13/21 03/06/21 1300   Non-staged Wound Description Full thickness 03/08/21 1038   Wound Length (cm) 12.6 cm 02/22/21 0830   Wound Width (cm) 3 cm 02/22/21 0830   Wound Depth (cm) 0.3 cm 02/22/21 0830   Wound Surface Area (cm^2) 37.8 cm^2 02/22/21 0830   Wound Volume (cm^3) 11.34 cm^3 02/22/21 0830   Post-Procedure Length (cm) 15 cm 02/03/21 1100   Post-Procedure Width (cm) 3.4 cm 02/03/21 1100   Post-Procedure Depth (cm) 0.2 cm 02/03/21 1100   Post-Procedure Surface Area (cm^2) 51 cm^2 02/03/21 1100   Post-Procedure Volume (cm^3) 10.2 cm^3 02/03/21 1100   Wound Healing % -11 02/22/21 0830   Wound Bed Granulation (%) 60 % 03/06/21 1300   Wound Bed Slough (%) 10 % 02/27/21 1100   Wound Bed Granulation (%) - Post-Procedure 100 % 02/03/21 1100   Tunneling (cm) 0 cm 02/10/21 1400   Undermining (cm) 0 cm 02/10/21 1400   Shape irregular 03/08/21 1038   Wound Odor Foul 03/08/21 1038   Pulses Left;2+;DP;PT 03/08/21 1038   Exposed Structures None 03/08/21 1038   WOUND NURSE ONLY - Time Spent with Patient (mins) 60 03/08/21 1038          Lab Values:    Lab Results   Component Value Date/Time    WBC 6.5 11/22/2020 02:44 AM    RBC 3.54 (L) 11/22/2020 02:44 AM    HEMOGLOBIN 9.9 (L) 11/22/2020 02:44 AM    HEMATOCRIT 30.5 (L) 11/22/2020 02:44 AM    CREACTPROT 1.07 (H) 08/12/2020 01:55 PM    SEDRATEWES 85 (H) 08/07/2020 01:20 PM    HBA1C 5.7 (H) 11/09/2018 06:30 PM      Culture Results show:  Recent Results (from the past 720 hour(s))   CULTURE WOUND W/ GRAM STAIN    Collection Time: 09/01/20  2:00 PM    Specimen: Left Leg; Wound   Result Value Ref Range     Significant Indicator POS (POS)     Source WND     Site LEFT LEG     Culture Result - (A)     Gram Stain Result       Moderate Gram positive cocci.  Moderate Gram positive rods.      Culture Result (A)      Beta Hemolytic Streptococcus group A  Moderate growth      Culture Result (A)      Methicillin Resistant Staphylococcus aureus  Moderate growth      Culture Result (A)      Diphtheroids  Moderate growth  Mixed morphologies.     KOSTAS: 9/20/21   Left.    Doppler waveforms of the common femoral artery are of high amplitude and    triphasic.    Doppler waveforms at the ankle are brisk and triphasic.    Ankle-brachial index is normal.    Unable to obtained popliteal waveforms due to wound bandages.     Self Report / Pain Level:  lidocaine to VAC sponge dwelled 12 minutes     INTERVENTIONS BY WOUND TEAM: INTERVENTIONS BY WOUND TEAM:  Performed standard wound care which includes appropriate positioning, dressing removal and non-selective debridement. Pictures and measurements obtained weekly if/when required.  Cleansed: Wound cleanser and gauze used to clean wound beds and periwound  Debridement: sharp debridement yellow Slough  using Curette <20 cm2 debrided    Periwound - Benzoin, hydrocolloid used around entire addison and to form bridge  Primary - Vanessa to wound beds, 2 piece of black foam half thickness cut to fit and placed to each wound bed, 1 black piece used to bridge and form button under TRAC pad- all secured with drape, restarted NPWT 150mmHg, no leaks noted.   Secondary - 2 layer compression wrap foot/leg/thigh     Interdisciplinary consultation: Patient, Bedside RN    EVALUATION / RATIONALE FOR TREATMENT:  3/6/12 area unchanged, odor persists  3/1/21: Lidocaine unavailable during this dressing change. Small amount of debridement completed with curette. Continued with silver powder for its antimicrobial properties.   2/27/21 skin bridge enlarging  2/24/2021: Excisional debridement by LPS APRN performed for  rolled edges that were starting to form along the posterior and medial left leg wound. Moderate bleeding managed by administration of lidocaine injection. CSWD performed for slough. Wound bed is beefy red following debridement. Resume agata and silver foam to manage bioburden. Continue with CSWD with each dressing change.   2/22/2021: Rolled edges starting to form along posterior thigh wound bed - may require excisional debridement by provider. Minimal changes to actual wound bed. Continue CSWD, agata, and silver foam to manage bioburden.   2/19/21: minimal to no changes from last assessment. Continue CSWD, agata, and silver foam for bioburden.  2/17/2021: Periwound remains intact. Adherent slough noted over wound bed. Scant bleeding noted on medial wound following CSWD. Continue with Agata over wound beds to further stimulate epithelization. Continue with silver foam to manage bioburden.   2/12/2021:periwound intact does not appear macerated.  Moderate amount of drainage. Wound bed debrided.   Picture frame draped over periwound, three silver foams used.  Continue NPWT and two layer compression.    2/10/21: Periwound looks less denuded today. Did not place the arglaes powder to wound bed - switching to Agata to see if it will stimulate healing in his wound bed. Did not use hydrofiber to periwound but it is in the room for next visit in case it is needed. Ordered and used liquid lidocaine to remove vac dressing and lidocaine gel to the posterior proximal part of wound. Patient tolerated debridement well with lidocaine.   02/05/21: Meredith wound is denuded and wet, vac drape lifting under wrap, Aquacel to dry wet and denuded skin, no silver foam available this dressing change.  02/03/21: Measurements are smaller for both medial and posterior thigh.  Wound bed has granulation and non-viable slough.  Will continue to debride every time with wound VAC changes to decrease bioburden in wound bed.   02/01/21:  No change.   Wounds appear to be improving slowly.      01/29/2021: wounds flush with periwound, fully granulated with scant yellow to a few areas. Medial periwound intact, lateral periwound with some erythema. LPS APRN suggested that next time applying brava strips to any irritated periwound may help reduce irritation.   1/27/21: Restarted NPWT, addison-wound looks better.   1/25/21: denuded tissue, vac vacation for until 1/27.  Re-assessment to place wound VAC to left LE.   1/21/21 area has decreased since last measurements.  Odor still present.  Addison skin compromised probably yeast infection under VAC drape.  Will hold VAC for 72 hours and re assess.  Nystatin to address yeast and dry out addison skin  1/15/21: position of leg may affect posterior thigh measurements.  Medial Leg wound area decreased.    01/10/2021: Wound vac replaced, patient accidentally removed vac and bedside RN was unable to repair.  Patient tolerated wound VAC placement. Patient had moderate ss with some green tinge drainage.   01/04/2021: 2 layer compression wrap re-applied to left LE due to ACE wrap leaving too much indentation to left LE.   12/31/2020 thigh wound much narrower.  Biofilm redeveloping and odor still present.   12/27/20: Wound bed granular and odor has lessen but still persists.   12/18/20 wound length decreased.  Odor persists.    12/14/2020: re-cultured wound bed.  12/11/2020 POD 23 Length of wounds has decreased, width has not changed.  Odor persists.  Pt continues to become angry with VAC when it limits his mobility.    12/7/2020 POD 19 odor persists, improved wound bed after debridement.  Possible bacterial vs yeast vs fungal infection.  Culture taken.  Nystatin to treat possible fungal infection, silver foam to decrease microbial population.    12/4/2020 POD# 16 odor worsening, more yellow tissue present, will add nystatin and silver foam next week and consider a culture  11/30 POD#11 granular buds visible to wound bed.  Same as prior.    11/27 POD#8 wound is odorous likely related to biologic dressing.  No overt signs of infection.  Granular buds are visible through adaptic.  Edges do not appear as thick as they were prior to last sx.  Continue with current treatment.    11/23: POD#4 s/p I&D of left LE wounds and biologic placed by Dr. Olvera on 11/19.  Wound bed is flatter and raised edges scar tissue seems to be gone.   11/14 Posterior thigh aspect of wound deteriorating, will increase frequency of treatment to keep biofilm down and hopefully avoid systemic abx.  Will include thigh in compression wrap.  Will consider culture if improvement is not seen in the next few treatments.    11/10: APRN unavailable at this time.  Will re-schedule excisional debridement to next week.   11/5: Plan for debridement of scarred edges on Tues by LPS. Tendon exposed to posterior aspect of wound, behind knee. Continue with current dressing care.  10/29: Excisional debridement by Asaf ELLER of scar tissue along the proximal edge of the posterior knee and lateral thigh.  Lateral aspect of thigh is flatten.  Medial aspect of knee has thick scar tissue that was excisionally debrided by Asaf ELLER.  Fully granulated throughout the wound bed.   10/23 wound bed mostly granular, superficial in depth, progress has been slow with the wound VAC so will trial collagen product to encourage new tissue growth.    10/19: wound bed has improved in color and is fully granulated.  Meredith-wound has improved, denudation has resolved. APRN continuing with serial weekly debridements as needed.   10/16: wound friable pt now on iv abx per ID.  Indurated edges addressed with drape and foam.  Meredith wound likely has yeast infection - addressing with silver powder crusting  10/14: patient seen with Asaf ELLER, stopping veraflo instillation.  Starting silver powder and regular wound VAC to see if it will help with bioburden.  Possible colonization of bacteria.  ID  involved.   10/12: Inflammation noted to the proximal part of the wound bed.  Will continue to monitor, possible vac break on Wednesday to help addison-skin inflammation.   10/7: Excisional debridement performed by Asaf ELLER. Will need to continue selective debridement with NPWT changes, and weekly excisional debridement with APRN to flatten out the scar tissue.  IV abx therapy ended 10/5.   10/5: Lateral wound still with some slough, both wounds smaller per measurements. Medial wound with all granular tissue.  9/30: Patient to start abx therapy for 7 days course per Dr. Ellis.  Started dakin's instillation to veraflo  9/28: malodorous today, culture taken.    9/25: Odor noted, though unclear if from wound or pt, consider shower before next Vac change. Consider regular vac next change, wound granular and superficial.   9/23: Patient still awaiting guardianship for placement.   9/18: wound granulating nicely and responding well to current treatment.    9/16: Wound bed with granular tissue, edges are thick but appear better than previous assessments. . NPWT VF to encourage closure by secondary intention and manage drainage while encouraging oxygenation and granulation to the wound bed. 2 layer compression to assist in decrease of swelling and encourage blood flow to wounds. Wound team to follow up and change 3x/week.      Goals: Steady decrease in wound area and depth weekly.    NURSING PLAN OF CARE ORDERS (X):    Dressing changes: See Dressing Care orders: X  Skin care: See Skin Care orders:   Rectal tube care: See Rectal Tube Care orders:   Other orders:      WOUND TEAM PLAN OF CARE:   Dressing changes by wound team:        Follow up 3 times weekly:                NPWT change 3 times weekly:  X,  NPWT changes 3x/ weekly with 2 layer compression wrap.  Follow up 1-2 times weekly:      Follow up Bi-Monthly:                   Follow up as needed:       Other (explain):     Anticipated discharge plans:  LTACH:         SNF/Rehab: X - patient will need ongoing wound care for LLE upon discharge - 3x/weekly           Home Health Care:           Outpatient Wound Center:            Self Care:

## 2021-03-08 NOTE — CARE PLAN
Problem: Safety  Goal: Will remain free from injury  Outcome: PROGRESSING AS EXPECTED   Patient education provided about hospital policies while on the unit.   Problem: Infection  Goal: Will remain free from infection  Outcome: PROGRESSING AS EXPECTED   Hand hygiene education provided.   Problem: Venous Thromboembolism (VTW)/Deep Vein Thrombosis (DVT) Prevention:  Goal: Patient will participate in Venous Thrombosis (VTE)/Deep Vein Thrombosis (DVT)Prevention Measures  Outcome: PROGRESSING AS EXPECTED   Early ambulation and medication education provided.

## 2021-03-08 NOTE — PROGRESS NOTES
[facility] Twice Weekly Progress Note    Date of Service  2/27/2021    Chief Complaint  Wound Infection    Hospital Course  Mr. Varela is a 65-year-old male with PMH alcohol dependence, tobacco dependence, psychiatric disorder, and HTN who presented to the emergency department 8/7/2020 with a left lower extremity wound infection. He was hospitalized at our facility in April and evaluated by orthopedic surgery who recommended wound care. Wound cultures at that time grew MSSA and Streptococcus. He had been seen at [facility] earlier that week and prescribed antibiotics, however he had not filled the prescription.  An ultrasound of that extremity was done and was negative for DVT.  He was treated for sepsis and completed an antibiotic course on 9/16/2020. He was also found to have head lice and underwent treatment for that.  A repeat wound culture was done on 9/28/2020 and grew Strep A and Proteus. Infectious disease was consulted and recommended a 5-day course of IV zosyn which was completed on 10/5/2020. On 10/12/2020 the wound care team noticed increased inflammation to the proximal part of the wound bed and switched from a vera flow wound VAC to a regular wound VAC.  ID was again consulted and on 10/14/2020 recommended to 7-day course of antibiotics with meropenem which was completed on 10/22/2020. Additionally, he was noted to have intermittent agitation so was started on risperdal, depakote, and gabapentin per psychiatric recommendations.  On 11/20/2020 he underwent left lower extremity debridement with wound VAC placement. Since then he has remained stable.  He has been deemed incapacitated to make medical decisions and guardianship was granted on 1/29/21. Placement will likely be in a group home.     Interval Problem Update  Patient ambulatory in room, pleasant and cooperative.  He is disoriented to date including month and year, however otherwise fully oriented.  His pain has been worse  lately.  -Trial of long-acting pain medication, equivalent reduction of as needed oxycodone  -Continue wound care per LPS for leg wound and debriding as needed.  Monitor for signs of recurrent infection.    Consultants/Specialty  -LPS  -Psychiatry  -Infectious Disease    Code Status  Full Code    Disposition  guardianship granted 1/29/21. SW/CM may attempt to start working on placement.  Obtaining financials.    Review of Systems  Review of Systems   Unable to perform ROS: Mental acuity        Physical Exam  Temp:  [36.6 °C (97.8 °F)-36.7 °C (98 °F)] 36.6 °C (97.8 °F)  Pulse:  [68-73] 73  Resp:  [15-17] 17  BP: (107-112)/(65-68) 110/65  SpO2:  [95 %-98 %] 96 %    Physical Exam  Vitals and nursing note reviewed.   HENT:      Head: Normocephalic and atraumatic.      Nose: Nose normal.   Eyes:      Conjunctiva/sclera: Conjunctivae normal.      Pupils: Pupils are equal, round, and reactive to light.   Cardiovascular:      Rate and Rhythm: Normal rate and regular rhythm.      Heart sounds: No murmur. No friction rub.   Pulmonary:      Effort: No respiratory distress.   Abdominal:      General: Bowel sounds are normal. There is no distension.      Palpations: Abdomen is soft.   Musculoskeletal:      Cervical back: Neck supple.      Comments: LLE compression wrap dressing with negative pressure wound VAC CDI   Skin:     General: Skin is warm and dry.   Neurological:      Mental Status: He is alert. He is confused.   Psychiatric:         Attention and Perception: Attention normal.         Mood and Affect: Mood normal.         Speech: Speech normal.         Thought Content: Thought content is delusional.     All systems reviewed and exam is unchanged from prior exam.      Fluids    Intake/Output Summary (Last 24 hours) at 3/7/2021 1634  Last data filed at 3/7/2021 1300  Gross per 24 hour   Intake 780 ml   Output --   Net 780 ml       Laboratory                        Imaging  None recent    Assessment/Plan  * Wound of left  leg- (present on admission)  Assessment & Plan  -Wound vac - he intermittently dislodges. Requires ongoing education  -Further management per wound care/LPS/ortho.   -Pain control. Trial of long acting, continue as needed at lower dose  -Improving slowly overall    Encephalopathy- (present on admission)  Assessment & Plan  -Improved   -Per psychiatry and Dr. Velázquez on 1/11: Patient is incapacitated.  -Public Guardian: Coretta Benjamin   -Attempting placement; possibly [facility]    Psychiatric disorder- (present on admission)  Assessment & Plan  -Unspecified.  -Continue Risperdal and Depakote.  Stable on current treatment.  -Psychiatry has consulted and deemed him incapacitated to leave AMA or make medical decisions.  -Initial cognitive evaluation 8/20.   -SLP repeated Cognitive eval 1/2021- continue to recommend 24/7 supervision   -Public Guardian: Coretta Jenkins   -Pending GH; obtaining financial status    Essential hypertension- (present on admission)  Assessment & Plan  -Well controlled on amlodipine    Emphysema/COPD (HCC)- (present on admission)  Assessment & Plan  -Chronic and stable off medication, without acute exacerbation    Protein malnutrition (HCC)- (present on admission)  Assessment & Plan  -Body mass index is 21.35 kg/m².   -Continue TID supplements.  -Encourage PO intake  resolving    Flexion contracture of knee, 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)  Assessment & Plan  -Secondary to chronic wound in that area.  -PT/OT.  -Does not seem to limit his mobility.   - Is frequently ambulatory.    Infection of wound due to methicillin resistant Staphylococcus aureus (MRSA)- (present on admission)  Assessment & Plan  -History of MRSA.  -Poor compliance with OP wound care. Poor compliance with wound vac at times.   -Continue pain control as needed.  -LPS and wound care following - debridements and wound VAC changes per their recommendations  -Cultures repeated 12/14 - positive for Proteus species, pan sensitive.   Completed regimen of augmentin.   -resolved       VTE prophylaxis: Ambulatory     I certify the patient requires continued medically necessary hospital services for the treatment of non-healing wound and cognitive disorder.  The patient will remain in the hospital for the foreseeable future.  Discharge may or may not occur in the next 20 days due to ongoing discharge delays due to having no medically acceptable discharge options.    LOUISE Hull.

## 2021-03-09 PROCEDURE — A9270 NON-COVERED ITEM OR SERVICE: HCPCS | Performed by: NURSE PRACTITIONER

## 2021-03-09 PROCEDURE — 770001 HCHG ROOM/CARE - MED/SURG/GYN PRIV*

## 2021-03-09 PROCEDURE — 700111 HCHG RX REV CODE 636 W/ 250 OVERRIDE (IP): Performed by: NURSE PRACTITIONER

## 2021-03-09 PROCEDURE — 700102 HCHG RX REV CODE 250 W/ 637 OVERRIDE(OP): Performed by: NURSE PRACTITIONER

## 2021-03-09 PROCEDURE — 700101 HCHG RX REV CODE 250: Performed by: NURSE PRACTITIONER

## 2021-03-09 RX ADMIN — RISPERIDONE 1 MG: 1 TABLET ORAL at 17:23

## 2021-03-09 RX ADMIN — ENOXAPARIN SODIUM 40 MG: 40 INJECTION SUBCUTANEOUS at 06:15

## 2021-03-09 RX ADMIN — LIDOCAINE 1 PATCH: 50 PATCH CUTANEOUS at 12:16

## 2021-03-09 RX ADMIN — MORPHINE SULFATE 15 MG: 15 TABLET, FILM COATED, EXTENDED RELEASE ORAL at 06:15

## 2021-03-09 RX ADMIN — DIVALPROEX SODIUM 125 MG: 125 CAPSULE ORAL at 22:40

## 2021-03-09 RX ADMIN — DIVALPROEX SODIUM 125 MG: 125 CAPSULE ORAL at 06:15

## 2021-03-09 RX ADMIN — GABAPENTIN 300 MG: 300 CAPSULE ORAL at 12:15

## 2021-03-09 RX ADMIN — RISPERIDONE 0.5 MG: 0.5 TABLET ORAL at 06:15

## 2021-03-09 RX ADMIN — OXYCODONE 5 MG: 5 TABLET ORAL at 22:40

## 2021-03-09 RX ADMIN — MORPHINE SULFATE 15 MG: 15 TABLET, FILM COATED, EXTENDED RELEASE ORAL at 17:22

## 2021-03-09 RX ADMIN — AMLODIPINE BESYLATE 5 MG: 5 TABLET ORAL at 06:15

## 2021-03-09 RX ADMIN — OXYCODONE 5 MG: 5 TABLET ORAL at 12:15

## 2021-03-09 RX ADMIN — DIVALPROEX SODIUM 125 MG: 125 CAPSULE ORAL at 12:15

## 2021-03-09 RX ADMIN — GABAPENTIN 300 MG: 300 CAPSULE ORAL at 17:23

## 2021-03-09 RX ADMIN — GABAPENTIN 300 MG: 300 CAPSULE ORAL at 06:15

## 2021-03-09 RX ADMIN — CYCLOBENZAPRINE 10 MG: 10 TABLET, FILM COATED ORAL at 17:23

## 2021-03-09 ASSESSMENT — PAIN DESCRIPTION - PAIN TYPE: TYPE: ACUTE PAIN

## 2021-03-09 NOTE — CARE PLAN
Problem: Knowledge Deficit  Goal: Knowledge of disease process/condition, treatment plan, diagnostic tests, and medications will improve  Outcome: PROGRESSING AS EXPECTED  Note: Discussed POC, wound care, discharge barriers. Reinforcement needed r/t memory loss.      Problem: Mobility  Goal: Risk for activity intolerance will decrease  Outcome: PROGRESSING AS EXPECTED  Intervention: Encourage patient to increase activity level in collaboration with Interdisciplinary Team  Note: Pt ambulatory ad erika. Staff to assist with wound vac as needed.

## 2021-03-09 NOTE — CARE PLAN
Problem: Infection  Goal: Will remain free from infection  Outcome: PROGRESSING AS EXPECTED   Wound vac in place.   Problem: Venous Thromboembolism (VTW)/Deep Vein Thrombosis (DVT) Prevention:  Goal: Patient will participate in Venous Thrombosis (VTE)/Deep Vein Thrombosis (DVT)Prevention Measures  Outcome: PROGRESSING AS EXPECTED   Early ambulation.

## 2021-03-09 NOTE — CARE PLAN
Problem: Venous Thromboembolism (VTW)/Deep Vein Thrombosis (DVT) Prevention:  Goal: Patient will participate in Venous Thrombosis (VTE)/Deep Vein Thrombosis (DVT)Prevention Measures  Outcome: PROGRESSING AS EXPECTED  Flowsheets  Taken 3/9/2021 1030  Risk Assessment Score: 1  VTE RISK: Moderate  Mechanical Prophylaxis: SCDs, Sequential Compression Device  SCDs, Sequential Compression Device: Refused  Pharmacologic Prophylaxis Used: LMWH: Enoxaparin(Lovenox)  Taken 3/8/2021 1500  AV Foot Pumps: Refused  EDA Hose (Graduated Compression Stockings): Refused  Note: Patient walked frequently throughout the day to aid in blood flow and help prevent clot formation.     Problem: Fluid Volume:  Goal: Will maintain balanced intake and output  Outcome: PROGRESSING AS EXPECTED  Note: Encouraged patient to maintain hydration; he drank several cups of fluids throughout the day.

## 2021-03-09 NOTE — DISCHARGE PLANNING
Anticipated Discharge Disposition: Group Home    Action: LSW emailed pt's Public Guardian Aletha Miguel A ([email or web address] and requested an update on the status of pt's financials for potential group home placement.  Her reply:    We are still waiting on a few responses from [employer] but all of the responses we have received back thus far have yielded no results of a bank account for Mr. Varela. We have reached out to [facility] but they are unwilling to provide us any information until our rep payee application has been processed (we resubmitted on 2/26 after they said they didn’t have record of it). Once [facility] is able to provide us with Mr. Varela’s work history and possible information about where his benefits were being deposited we will be able to get moving a little quicker with things. I’ll be in touch as soon as I have any more updates!    Barriers to Discharge: pending financial resources, group home placement     Plan: CM to f/u w/ public guardian and assist w/ placement pending financials.

## 2021-03-09 NOTE — CARE PLAN
Problem: Venous Thromboembolism (VTW)/Deep Vein Thrombosis (DVT) Prevention:  Goal: Patient will participate in Venous Thrombosis (VTE)/Deep Vein Thrombosis (DVT)Prevention Measures  Outcome: PROGRESSING AS EXPECTED  Intervention: Encourage ambulation/mobilization at level directed by Physical Therapy in collaboration with Interdisciplinary Team  Note: Pt ambulates multiple times per day with no assist.      Problem: Pain Management  Goal: Pain level will decrease to patient's comfort goal  Note: Pain medication and muscle relaxer administered per MAR with good effect.

## 2021-03-10 PROCEDURE — 700102 HCHG RX REV CODE 250 W/ 637 OVERRIDE(OP): Performed by: NURSE PRACTITIONER

## 2021-03-10 PROCEDURE — 770001 HCHG ROOM/CARE - MED/SURG/GYN PRIV*

## 2021-03-10 PROCEDURE — A9270 NON-COVERED ITEM OR SERVICE: HCPCS | Performed by: NURSE PRACTITIONER

## 2021-03-10 PROCEDURE — 700111 HCHG RX REV CODE 636 W/ 250 OVERRIDE (IP): Performed by: NURSE PRACTITIONER

## 2021-03-10 PROCEDURE — 700101 HCHG RX REV CODE 250: Performed by: NURSE PRACTITIONER

## 2021-03-10 PROCEDURE — 99232 SBSQ HOSP IP/OBS MODERATE 35: CPT | Performed by: NURSE PRACTITIONER

## 2021-03-10 PROCEDURE — 97597 DBRDMT OPN WND 1ST 20 CM/<: CPT

## 2021-03-10 RX ADMIN — CYCLOBENZAPRINE 10 MG: 10 TABLET, FILM COATED ORAL at 22:09

## 2021-03-10 RX ADMIN — GABAPENTIN 300 MG: 300 CAPSULE ORAL at 13:27

## 2021-03-10 RX ADMIN — OXYCODONE 5 MG: 5 TABLET ORAL at 07:36

## 2021-03-10 RX ADMIN — GABAPENTIN 300 MG: 300 CAPSULE ORAL at 04:45

## 2021-03-10 RX ADMIN — GABAPENTIN 300 MG: 300 CAPSULE ORAL at 17:08

## 2021-03-10 RX ADMIN — DIVALPROEX SODIUM 125 MG: 125 CAPSULE ORAL at 13:27

## 2021-03-10 RX ADMIN — MORPHINE SULFATE 15 MG: 15 TABLET, FILM COATED, EXTENDED RELEASE ORAL at 04:45

## 2021-03-10 RX ADMIN — DIVALPROEX SODIUM 125 MG: 125 CAPSULE ORAL at 04:45

## 2021-03-10 RX ADMIN — ENOXAPARIN SODIUM 40 MG: 40 INJECTION SUBCUTANEOUS at 04:45

## 2021-03-10 RX ADMIN — RISPERIDONE 1 MG: 1 TABLET ORAL at 17:08

## 2021-03-10 RX ADMIN — HYDROXYZINE HYDROCHLORIDE 25 MG: 50 TABLET, FILM COATED ORAL at 08:31

## 2021-03-10 RX ADMIN — CYCLOBENZAPRINE 10 MG: 10 TABLET, FILM COATED ORAL at 08:31

## 2021-03-10 RX ADMIN — AMLODIPINE BESYLATE 5 MG: 5 TABLET ORAL at 04:45

## 2021-03-10 RX ADMIN — LIDOCAINE 1 PATCH: 50 PATCH CUTANEOUS at 13:27

## 2021-03-10 RX ADMIN — RISPERIDONE 0.5 MG: 0.5 TABLET ORAL at 04:45

## 2021-03-10 RX ADMIN — OXYCODONE 5 MG: 5 TABLET ORAL at 21:42

## 2021-03-10 RX ADMIN — DIVALPROEX SODIUM 125 MG: 125 CAPSULE ORAL at 21:43

## 2021-03-10 RX ADMIN — MORPHINE SULFATE 15 MG: 15 TABLET, FILM COATED, EXTENDED RELEASE ORAL at 17:08

## 2021-03-10 RX ADMIN — OXYCODONE 5 MG: 5 TABLET ORAL at 13:27

## 2021-03-10 RX ADMIN — HYDROXYZINE HYDROCHLORIDE 25 MG: 50 TABLET, FILM COATED ORAL at 22:09

## 2021-03-10 ASSESSMENT — PAIN DESCRIPTION - PAIN TYPE
TYPE: ACUTE PAIN

## 2021-03-10 ASSESSMENT — FIBROSIS 4 INDEX: FIB4 SCORE: 0.74

## 2021-03-10 NOTE — CARE PLAN
Problem: Venous Thromboembolism (VTW)/Deep Vein Thrombosis (DVT) Prevention:  Goal: Patient will participate in Venous Thrombosis (VTE)/Deep Vein Thrombosis (DVT)Prevention Measures  Outcome: PROGRESSING AS EXPECTED  Flowsheets  Taken 3/10/2021 0900 by Veena Lopez R.N.  Risk Assessment Score: 1  VTE RISK: Moderate  Mechanical Prophylaxis: SCDs, Sequential Compression Device  SCDs, Sequential Compression Device: Refused  Pharmacologic Prophylaxis Used: LMWH: Enoxaparin(Lovenox)  Taken 3/9/2021 2030 by José Miguel Rosales R.N.  AV Foot Pumps: Refused  EDA Hose (Graduated Compression Stockings): Refused  Note: Lovenox administered; no signs or symptoms of clot formation at this time.      Problem: Fluid Volume:  Goal: Will maintain balanced intake and output  Outcome: PROGRESSING AS EXPECTED  Note: Patient consumed adequate liquids and urinated several times throughout the day; really enjoys coffee; no signs or symptoms of dehydration or fluid retention at this time.

## 2021-03-10 NOTE — CARE PLAN
Problem: Safety  Goal: Will remain free from injury  Outcome: PROGRESSING AS EXPECTED  Goal: Will remain free from falls  Description: Pt mobilizes frequently independently.   Outcome: PROGRESSING AS EXPECTED     Problem: Venous Thromboembolism (VTW)/Deep Vein Thrombosis (DVT) Prevention:  Goal: Patient will participate in Venous Thrombosis (VTE)/Deep Vein Thrombosis (DVT)Prevention Measures  Outcome: PROGRESSING AS EXPECTED     Problem: Bowel/Gastric:  Goal: Normal bowel function is maintained or improved  Outcome: PROGRESSING AS EXPECTED  Goal: Will not experience complications related to bowel motility  Outcome: PROGRESSING AS EXPECTED     Problem: Fluid Volume:  Goal: Will maintain balanced intake and output  Outcome: PROGRESSING AS EXPECTED     Problem: Mobility  Goal: Risk for activity intolerance will decrease  Outcome: PROGRESSING AS EXPECTED     Problem: Knowledge Deficit  Goal: Knowledge of disease process/condition, treatment plan, diagnostic tests, and medications will improve  Outcome: PROGRESSING SLOWER THAN EXPECTED  Goal: Knowledge of the prescribed therapeutic regimen will improve  Outcome: PROGRESSING SLOWER THAN EXPECTED     Problem: Psychosocial Needs:  Goal: Level of anxiety will decrease  Outcome: PROGRESSING SLOWER THAN EXPECTED

## 2021-03-10 NOTE — WOUND TEAM
[facility]  Inpatient Services  Wound and Skin Care Progress Note    Admission Date: 8/7/2020     Last order of IP CONSULT TO WOUND CARE was found on 9/1/2020 from Hospital Encounter on 8/7/2020     HPI, PMH, SH: Reviewed    Unit where seen by Wound Team: [id number]    WOUND CONSULT/FOLLOW UP RELATED TO:  Left leg scheduled negative pressure wound therapy (npwt) dressing change and 2 layer compression wrap change    OBJECTIVE: NPWT dressing and 2 layer compression wrap in place. Patient on pressure redistribution mattress, up self, ambulates frequently, turns self.    WOUND TYPE, LOCATION, CHARACTERISTICS (Pressure Injuries: location, stage, POA or date identified)     Negative Pressure Wound Therapy 11/20/20 Leg Lateral;Posterior;Medial Left (Active)   Vacuum Serial Number [id number]    NPWT Pump Mode / Pressure Setting Continuous;150 mmHg    Dressing Type Medium;Black Foam (Regular)    Number of Foam Pieces Used 3    Canister Changed No    Output (mL) 20 mL    NEXT Dressing Change/Treatment Date 03/12/21    WOUND NURSE ONLY - Time Spent with Patient (mins) 90            Wound 11/19/20 Full Thickness Wound Leg Lateral;Posterior;Medial Left (Active)   Wound Image       Site Assessment Red;Pink    Periwound Assessment Pink;Scar tissue    Margins Defined edges    Closure Secondary intention    Drainage Amount Small    Drainage Description Serosanguineous    Treatments Cleansed;Site care;CSWD - Conservative Sharp Wound Debridement    Wound Cleansing Approved Wound Cleanser    Periwound Protectant Skin Protectant Wipes to Periwound;Hydrocolloid;Drape    Dressing Cleansing/Solutions Not Applicable    Dressing Options Collagen Dressing;Wound Vac;Compression Wrap Two Layer    Dressing Changed Changed    Dressing Status Clean;Dry;Intact    Dressing Change/Treatment Frequency Monday, Wednesday, Friday, and As Needed    NEXT Dressing Change/Treatment Date 03/12/21    NEXT Weekly Photo (Inpatient Only) 03/12/21     Non-staged Wound Description Full thickness    Wound Length (cm) 12.6 cm    Wound Width (cm) 3 cm    Wound Depth (cm) 0.3 cm    Wound Surface Area (cm^2) 37.8 cm^2    Wound Volume (cm^3) 11.34 cm^3    Post-Procedure Length (cm) 15 cm    Post-Procedure Width (cm) 3.4 cm    Post-Procedure Depth (cm) 0.2 cm    Post-Procedure Surface Area (cm^2) 51 cm^2    Post-Procedure Volume (cm^3) 10.2 cm^3    Wound Healing % -11    Wound Bed Granulation (%) 60 %    Wound Bed Slough (%) 10 %    Wound Bed Granulation (%) - Post-Procedure 100 %    Tunneling (cm) 0 cm    Undermining (cm) 0 cm    Shape irregular    Wound Odor Foul    Pulses Left;2+;PT;DP    Exposed Structures None    WOUND NURSE ONLY - Time Spent with Patient (mins) 90                  Lab Values:    Lab Results   Component Value Date/Time    WBC 6.5 11/22/2020 02:44 AM    RBC 3.54 (L) 11/22/2020 02:44 AM    HEMOGLOBIN 9.9 (L) 11/22/2020 02:44 AM    HEMATOCRIT 30.5 (L) 11/22/2020 02:44 AM    CREACTPROT 1.07 (H) 08/12/2020 01:55 PM    SEDRATEWES 85 (H) 08/07/2020 01:20 PM    HBA1C 5.7 (H) 11/09/2018 06:30 PM      Culture Results show:  Recent Results (from the past 720 hour(s))   CULTURE WOUND W/ GRAM STAIN    Collection Time: 09/01/20  2:00 PM    Specimen: Left Leg; Wound   Result Value Ref Range    Significant Indicator POS (POS)     Source WND     Site LEFT LEG     Culture Result - (A)     Gram Stain Result       Moderate Gram positive cocci.  Moderate Gram positive rods.      Culture Result (A)      Beta Hemolytic Streptococcus group A  Moderate growth      Culture Result (A)      Methicillin Resistant Staphylococcus aureus  Moderate growth      Culture Result (A)      Diphtheroids  Moderate growth  Mixed morphologies.     KOSTAS: 9/20/21   Left.    Doppler waveforms of the common femoral artery are of high amplitude and    triphasic.    Doppler waveforms at the ankle are brisk and triphasic.    Ankle-brachial index is normal.    Unable to obtained popliteal waveforms  due to wound bandages.     Self Report / Pain Level:  lidocaine to VAC sponge dwelled 12 minutes     INTERVENTIONS BY WOUND TEAM: INTERVENTIONS BY WOUND TEAM:  Performed standard wound care which includes appropriate positioning, dressing removal and non-selective debridement. Pictures and measurements obtained weekly if/when required.  Cleansed: Wound cleanser and gauze used to clean wound beds and periwound  Debridement: sharp debridement yellow Slough  using Curette <20 cm2 debrided    Periwound - Benzoin, hydrocolloid used around entire addison and to form bridge  Primary - Agata to wound beds, 2 piece of black foam half thickness cut to fit and placed to each wound bed, 1 black piece used to bridge and form button under TRAC pad- all secured with drape, restarted NPWT 150mmHg, no leaks noted.   Secondary - 2 layer compression wrap foot/leg/thigh     Interdisciplinary consultation: Patient, Bedside RN, Asaf SOLOMON APRN, Sera Wound RN    EVALUATION / RATIONALE FOR TREATMENT:  3/10/2021: debrided wound, odor present, continue NPWT  3/6/12 area unchanged, odor persists  3/1/21: Lidocaine unavailable during this dressing change. Small amount of debridement completed with curette. Continued with silver powder for its antimicrobial properties.   2/27/21 skin bridge enlarging  2/24/2021: Excisional debridement by LPS APRN performed for rolled edges that were starting to form along the posterior and medial left leg wound. Moderate bleeding managed by administration of lidocaine injection. CSWD performed for slough. Wound bed is beefy red following debridement. Resume agata and silver foam to manage bioburden. Continue with CSWD with each dressing change.   2/22/2021: Rolled edges starting to form along posterior thigh wound bed - may require excisional debridement by provider. Minimal changes to actual wound bed. Continue CSWD, agata, and silver foam to manage bioburden.   2/19/21: minimal to no changes from last  assessment. Continue CSWD, agata, and silver foam for bioburden.  2/17/2021: Periwound remains intact. Adherent slough noted over wound bed. Scant bleeding noted on medial wound following CSWD. Continue with Agata over wound beds to further stimulate epithelization. Continue with silver foam to manage bioburden.   2/12/2021:periwound intact does not appear macerated.  Moderate amount of drainage. Wound bed debrided.   Picture frame draped over periwound, three silver foams used.  Continue NPWT and two layer compression.    2/10/21: Periwound looks less denuded today. Did not place the arglaes powder to wound bed - switching to Agata to see if it will stimulate healing in his wound bed. Did not use hydrofiber to periwound but it is in the room for next visit in case it is needed. Ordered and used liquid lidocaine to remove vac dressing and lidocaine gel to the posterior proximal part of wound. Patient tolerated debridement well with lidocaine.   02/05/21: Meredith wound is denuded and wet, vac drape lifting under wrap, Aquacel to dry wet and denuded skin, no silver foam available this dressing change.  02/03/21: Measurements are smaller for both medial and posterior thigh.  Wound bed has granulation and non-viable slough.  Will continue to debride every time with wound VAC changes to decrease bioburden in wound bed.   02/01/21:  No change.  Wounds appear to be improving slowly.      01/29/2021: wounds flush with periwound, fully granulated with scant yellow to a few areas. Medial periwound intact, lateral periwound with some erythema. LPS APRN suggested that next time applying brava strips to any irritated periwound may help reduce irritation.   1/27/21: Restarted NPWT, meredith-wound looks better.   1/25/21: denuded tissue, vac vacation for until 1/27.  Re-assessment to place wound VAC to left LE.   1/21/21 area has decreased since last measurements.  Odor still present.  Meredith skin compromised probably yeast infection  under VAC drape.  Will hold VAC for 72 hours and re assess.  Nystatin to address yeast and dry out addison skin  1/15/21: position of leg may affect posterior thigh measurements.  Medial Leg wound area decreased.    01/10/2021: Wound vac replaced, patient accidentally removed vac and bedside RN was unable to repair.  Patient tolerated wound VAC placement. Patient had moderate ss with some green tinge drainage.   01/04/2021: 2 layer compression wrap re-applied to left LE due to ACE wrap leaving too much indentation to left LE.   12/31/2020 thigh wound much narrower.  Biofilm redeveloping and odor still present.   12/27/20: Wound bed granular and odor has lessen but still persists.   12/18/20 wound length decreased.  Odor persists.    12/14/2020: re-cultured wound bed.  12/11/2020 POD 23 Length of wounds has decreased, width has not changed.  Odor persists.  Pt continues to become angry with VAC when it limits his mobility.    12/7/2020 POD 19 odor persists, improved wound bed after debridement.  Possible bacterial vs yeast vs fungal infection.  Culture taken.  Nystatin to treat possible fungal infection, silver foam to decrease microbial population.    12/4/2020 POD# 16 odor worsening, more yellow tissue present, will add nystatin and silver foam next week and consider a culture  11/30 POD#11 granular buds visible to wound bed.  Same as prior.   11/27 POD#8 wound is odorous likely related to biologic dressing.  No overt signs of infection.  Granular buds are visible through adaptic.  Edges do not appear as thick as they were prior to last sx.  Continue with current treatment.    11/23: POD#4 s/p I&D of left LE wounds and biologic placed by Dr. Olvera on 11/19.  Wound bed is flatter and raised edges scar tissue seems to be gone.   11/14 Posterior thigh aspect of wound deteriorating, will increase frequency of treatment to keep biofilm down and hopefully avoid systemic abx.  Will include thigh in compression wrap.  Will  consider culture if improvement is not seen in the next few treatments.    11/10: APRN unavailable at this time.  Will re-schedule excisional debridement to next week.   11/5: Plan for debridement of scarred edges on Tues by LPS. Tendon exposed to posterior aspect of wound, behind knee. Continue with current dressing care.  10/29: Excisional debridement by Asaf ELLER of scar tissue along the proximal edge of the posterior knee and lateral thigh.  Lateral aspect of thigh is flatten.  Medial aspect of knee has thick scar tissue that was excisionally debrided by Asaf ELLER.  Fully granulated throughout the wound bed.   10/23 wound bed mostly granular, superficial in depth, progress has been slow with the wound VAC so will trial collagen product to encourage new tissue growth.    10/19: wound bed has improved in color and is fully granulated.  Addison-wound has improved, denudation has resolved. APRN continuing with serial weekly debridements as needed.   10/16: wound friable pt now on iv abx per ID.  Indurated edges addressed with drape and foam.  Addison wound likely has yeast infection - addressing with silver powder crusting  10/14: patient seen with Asaf ELLER, stopping veraflo instillation.  Starting silver powder and regular wound VAC to see if it will help with bioburden.  Possible colonization of bacteria.  ID involved.   10/12: Inflammation noted to the proximal part of the wound bed.  Will continue to monitor, possible vac break on Wednesday to help addison-skin inflammation.   10/7: Excisional debridement performed by Asaf ELLER. Will need to continue selective debridement with NPWT changes, and weekly excisional debridement with APRN to flatten out the scar tissue.  IV abx therapy ended 10/5.   10/5: Lateral wound still with some slough, both wounds smaller per measurements. Medial wound with all granular tissue.  9/30: Patient to start abx therapy for 7 days course per Dr. Ellis.   Started dakin's instillation to veraflo  9/28: malodorous today, culture taken.    9/25: Odor noted, though unclear if from wound or pt, consider shower before next Vac change. Consider regular vac next change, wound granular and superficial.   9/23: Patient still awaiting guardianship for placement.   9/18: wound granulating nicely and responding well to current treatment.    9/16: Wound bed with granular tissue, edges are thick but appear better than previous assessments. . NPWT VF to encourage closure by secondary intention and manage drainage while encouraging oxygenation and granulation to the wound bed. 2 layer compression to assist in decrease of swelling and encourage blood flow to wounds. Wound team to follow up and change 3x/week.      Goals: Steady decrease in wound area and depth weekly.    NURSING PLAN OF CARE ORDERS (X):    Dressing changes: See Dressing Care orders: X  Skin care: See Skin Care orders:   Rectal tube care: See Rectal Tube Care orders:   Other orders:      WOUND TEAM PLAN OF CARE:   Dressing changes by wound team:        Follow up 3 times weekly:                NPWT change 3 times weekly:  X,  NPWT changes 3x/ weekly with 2 layer compression wrap.  Follow up 1-2 times weekly:      Follow up Bi-Monthly:                   Follow up as needed:       Other (explain):     Anticipated discharge plans:  LTACH:        SNF/Rehab: X - patient will need ongoing wound care for LLE upon discharge - 3x/weekly           Home Health Care:           Outpatient Wound Center:            Self Care:

## 2021-03-10 NOTE — PROGRESS NOTES
"WOUND CARE PROVIDER PROGRESS NOTE        HPI: 65-year-old male homelessness, EtOH use, tobacco dependence, chronic left lower extremity wound admitted 8/7/2020 with left lower extremity wound infection.  Patient was recently hospitalized at [facility] in April 2020, evaluated by orthopedic surgery who recommended wound care.  Wound culture grew MSSA and strep.  Patient was recently seen at [facility] prior to this admission was given a prescription for antibiotics but did not fill this.  Patient reports that he has had this wound for 8 to 10 years.  He reports that he fell and went \"ass over tea kettle\".  He reports that he would clean the wound almost daily with soap and water at the homeless shelter.  Per chart review, patient reported he developed the ulcer in May 2018 when he fell while hiking.  Patient was seen at wound care clinic in August 2018.  Patient had a  assisting him while he was living at [facility].  Wound VAC /was ordered however  had concerns with patient's compliancy and/ access to medical care.  Skilled nursing facility was contacted to have patient be admitted, however he was not accepted due to Medicaid.    Not on any blood thinners.  Patient ambulatory in halls.  Allergies reviewed.  He denies any allergies.    Surgery date: 9/1/2020 by Belkis ELLER  Procedure: Excision debridement to left leg ulcer with injectable lidocaine and epinephrine     Surgery date: 11/19/2020 by Dr. Olvera  Procedure:1.  Irrigation and debridement of multiple compartments of the left leg with 2   separate 16 cm x 5 cm superficial ulcerations in posterior knee and calf.  2.  AmnioFix biologic sheet placement, left leg.  3.  Wound VAC placement, left leg, 16x5 + 16x5 cm.      Surgery date: 2/24/21 by Belkis ELLER  Procedure: Excision debridement to left leg ulcer with injectable lidocaine and epinephrine             Interval hx:   9/11/2020: pt calm cooperative. On " zyvox. Seen during VAC and two layer compression wrap change. Pt tolerating VAC and wraps. Wound decreased in size.   9/18/2020: Patient denies any fevers, chills, nausea, vomiting.  He is tolerating the veraflo wound VAC and 2 layer compression wrap.  Wound is decreasing in size.  Increased granular tissue noted, wound edges have improved however he does have rolled edges to popliteal fossa area.  Patient calm and cooperative, watching sports.  9/30/2020: Denies fevers, chills, nausea, vomiting.  Tolerating wound VAC and 2 layer compression wrap.  Refused nail care.  Wound culture positive for strep A and Proteus.  10/7/2020: completed 5 day course of zyvox. Denies fevers chills nausea vomiting.  Seen during veraflo VAC and 2 layer compression wrap change.    10/14/2020: Patient denies fevers, chills, nausea, vomiting.  Patient wound is malodorous complaining of increased pain to the wound.  Increased slough noted to wound bed despite veraflo wound VAC.  Reconsult ID.  10/16/2020: Patient seen during wound VAC change with [facility] wound care PT. patient denies fever, chills, nausea, vomiting.  Patient reports pain to wound and increase in pain with wound VAC change.  Patient has granular tissue throughout wound with mild amount of slough to posterior aspect of leg.  Malodorous with VAC removal.  Wound VAC nursing changed.  10/29/2020: Wound VAC was discontinued by wound team on 10/23/2020 due to slow progress and collagen was started.  Patient has completed 7-day antibiotic course of meropenem for multidrug resistant Proteus vulgaris.  Patient found to have lice and has been treated.  Nonfoul-smelling odor present with dressing removal.  11/5/2020: Seen with wound team during 2 layer compression dressing change and for excisional debridement.  No odor noted today.  Thick layer of biofilm noted.  Wound edges continue to improve but still remain to medial aspect of wound.  11/17/2020: Seen with wound team during  dressing and 2 layer compression wrap change.  Patient with severely thick scar tissue to wound edges.  Excisional debridement with injectable lidocaine and epinephrine performed today.  Patient denies any fevers, chills, nausea, vomiting.  11/23/2020: POD #4 SP left leg I&D with biologic and VAC placement.  VAC functioning.  Foul odor coming from wound.  11/30/2020: POD #11.  Patient tired of walking around with wound VAC machine.  But agreeable to continue with wound VAC.  Mild odor coming from wound with wound VAC removal.  12/7/2020: POD #18.  Seen during wound VAC change.  Sitter no longer at bedside.  12/14/2020 POD # 25.  Seen during wound VAC change with wound team.  Previous wound culture from last week showed organisms but they were not worked up.  New wound culture to be taken today.  Odor remains slightly diminished.  Drainage heavy, slightly decreased from last week.  Continue with nystatin powder and silver foam.  Patient tired today.  12/16/2020: Wound culture 12/14/2020 + for Proteus Mirabilis.  Discussed case with ID, previously following this admit.  Recommended Augmentin for 1 week.  12/17/2020: Patient not drinking boost.  Has stockpile in room.  Dietary consulted.  Started Augmentin yesterday. Contacted micro to review culture.  So far growing Proteus and diphtheroids.  Micro to assess for fungal component.  1/4/2021: Wounds have  into 2 wounds again and are decreasing in size.  No longer doing silver foam but rather Arglaes powder and nystatin powder.  Odor remains.  Completed antibiotics.  Guardianship hearing is 1/29/2021.  Had arginaid supplement 4 times per day for 2 weeks that started on 12/17/2020.  1/22/2021: Seen with wound team for VAC change.  Odor is decreased.  Skin bridges have formed and now patient has 3 smaller wounds.  However there is increased periwound breakdown.  Patient experiencing pain with VAC change to proximal thigh.  Topical lidocaine used.  Wound VAC held  for weekend due to periwound breakdown.  2/5/2021: Seen with wound team during VAC change.  Odor remains the same.  There is no longer skin bridge to the medial wound  it into 2.  Patient has drainage seeping out underneath the drape causing periwound irritation.  Excisional debridement was performed today.  Patient has guardian.  2/24/21: Seen with wound team during VAC change.  Odor remains.  Epibole to skin edges.  There is significant amount of slough and tenderness to lateral proximal thigh ulcer.  Excisional debridement performed today with injectable lidocaine and epinephrine.  Consent obtained from guardian.  3/3/21: POD # 7 s/p bedside I & D. Restarted L arginine supplements. Has not drank supplement yet today.   3/10/2021: POD #14.  Drinking L-arginine supplements.  Showered before wound VAC change.  Patient trimmed his toenails and fingernails last week.          Results:  Wound culture left leg 12/14/2020: No fungal growth, Proteus mirabilis  Wound culture left leg 12/7/2020:Light growth mixed organisms.   Covid screen not detected 11/17/2020  CBC 11/22/2020: WBC 6.5, hemoglobin and hematocrit 9.9/30.5.  Platelet count 403.  CBC with differential 10/14/2020: WBC 6, H&H 11/33.9  Wound culture: 9/28/2020: Positive for beta-hemolytic strep group A, Proteus.    Wound cx: 9/1/2020 mrsa, diphtheroids, beta hemolytic strep group A    8/7/2020: X-ray tib-fib left:  1.  Healed distal metaphyseal fractures of the left fibula and tibia with tibial IM layla in place.     2.  No acute fracture or dislocation.     3.  No radiopaque soft tissue foreign body.      Arterial studies:   9/2/2020  Right.    Doppler waveforms of the common femoral artery are of high amplitude and    triphasic.    Doppler waveforms at the ankle are brisk and triphasic.    Ankle-brachial index is normal.       Left.    Doppler waveforms of the common femoral artery are of high amplitude and    triphasic.    Doppler waveforms at the  ankle are brisk and triphasic.    Ankle-brachial index is normal.    Unable to obtained popliteal waveforms due to wound bandages.          Exam:    Palpable PT pulse to left foot +1 foot   +2 DP left foot  Difficulty palpating popliteal due to scar tissue  Able to extend left leg to 160 degrees. Able to flex left leg around 80 degrees      Right foot skin significantly improved.  Dimethicone lotion applied  Left foot and lower leg flaky skin.  Dimethicone lotion applied      Left lower leg full-thickness ulcer  Odor remains the same.  Drainage has decreased slightly  Medial wound is no longer .        Lateral ulcer:  Wound edges flat, except for approximately 3 cm area to medial edge  Thin biofilm   slight tenderness to proximal aspect  No periwound erythema  periwound improved since using hydrocolloid  Edema controlled  Measures: 15.5 x 3.8 x 0.2cm      Medial ulcer:  Wound edges flat  Thin biofilm over wound  Edema controlled  No periwound irritation noted   measurement: 14.5 x 3.8 x 0.1cm         - CSWD and Wound care completed by wound RN Marci, refer to flowsheet.  -Patient tolerated the procedure well, without c/o pain or discomfort.                   ASSESSMENT/PLAN:  65-year-old male with homelessness, EtOH use, tobacco use, and chronic left leg ulcer.  SP left leg I&D with amnio fix and wound VAC placement by Dr. Olvera on 11/19/2020    -Wound remains as 2 wounds.  Skin bridge to medial wound is no longer present   Wound size remains essentially unchanged.   -L arginine supplements restarted 3/2/21.   -periwound irritation to proximal aspect of lateral wound improved with hydrocolloid.  -Odor remains, heavy drainage slightly decreased  -biofilm debrided each visit  -Edema controlled with 2 layer compression wrap.  -Patient has completed 1 week of Augmentin and L-arginine supplement for 2 weeks in December 2020  -No plans for [facility] at this time.  -Patient to shower before each wound VAC  change    PLAN  -Continue wound VAC.  Change 3 times per week  - hydrocolloid to periwound, Vanessa, black foam, drape to wound at 125 mmHg continuous  -Continue 3 times a week debridement to wound to reduce biofilm  -Continue 2 layer compression wrap extending to thigh  -shower prior to VAC changes, ok to remove 2 layer compression wrap to shower    Patient to continue to be seen by wound care team while admitted   wound care nurse practitioner to follow as needed      Discharge plan:  Working on group home placement.  Patient has guardian.      D/W: Patient, RN, Marci Shaw, EKATERINA with wound team,

## 2021-03-11 PROCEDURE — 700101 HCHG RX REV CODE 250: Performed by: NURSE PRACTITIONER

## 2021-03-11 PROCEDURE — 700102 HCHG RX REV CODE 250 W/ 637 OVERRIDE(OP): Performed by: NURSE PRACTITIONER

## 2021-03-11 PROCEDURE — 99231 SBSQ HOSP IP/OBS SF/LOW 25: CPT | Performed by: NURSE PRACTITIONER

## 2021-03-11 PROCEDURE — A9270 NON-COVERED ITEM OR SERVICE: HCPCS | Performed by: NURSE PRACTITIONER

## 2021-03-11 PROCEDURE — 700111 HCHG RX REV CODE 636 W/ 250 OVERRIDE (IP): Performed by: NURSE PRACTITIONER

## 2021-03-11 PROCEDURE — 770001 HCHG ROOM/CARE - MED/SURG/GYN PRIV*

## 2021-03-11 RX ADMIN — GABAPENTIN 300 MG: 300 CAPSULE ORAL at 12:54

## 2021-03-11 RX ADMIN — ENOXAPARIN SODIUM 40 MG: 40 INJECTION SUBCUTANEOUS at 05:07

## 2021-03-11 RX ADMIN — DIVALPROEX SODIUM 125 MG: 125 CAPSULE ORAL at 12:54

## 2021-03-11 RX ADMIN — CYCLOBENZAPRINE 10 MG: 10 TABLET, FILM COATED ORAL at 12:55

## 2021-03-11 RX ADMIN — GABAPENTIN 300 MG: 300 CAPSULE ORAL at 05:04

## 2021-03-11 RX ADMIN — DIVALPROEX SODIUM 125 MG: 125 CAPSULE ORAL at 05:05

## 2021-03-11 RX ADMIN — OXYCODONE 5 MG: 5 TABLET ORAL at 22:48

## 2021-03-11 RX ADMIN — OXYCODONE 5 MG: 5 TABLET ORAL at 12:55

## 2021-03-11 RX ADMIN — MORPHINE SULFATE 15 MG: 15 TABLET, FILM COATED, EXTENDED RELEASE ORAL at 05:05

## 2021-03-11 RX ADMIN — MORPHINE SULFATE 15 MG: 15 TABLET, FILM COATED, EXTENDED RELEASE ORAL at 17:19

## 2021-03-11 RX ADMIN — AMLODIPINE BESYLATE 5 MG: 5 TABLET ORAL at 05:04

## 2021-03-11 RX ADMIN — HYDROXYZINE HYDROCHLORIDE 25 MG: 50 TABLET, FILM COATED ORAL at 12:55

## 2021-03-11 RX ADMIN — LIDOCAINE 1 PATCH: 50 PATCH CUTANEOUS at 12:54

## 2021-03-11 RX ADMIN — RISPERIDONE 1 MG: 1 TABLET ORAL at 17:19

## 2021-03-11 RX ADMIN — GABAPENTIN 300 MG: 300 CAPSULE ORAL at 17:19

## 2021-03-11 RX ADMIN — RISPERIDONE 0.5 MG: 0.5 TABLET ORAL at 05:05

## 2021-03-11 RX ADMIN — HYDROXYZINE HYDROCHLORIDE 25 MG: 50 TABLET, FILM COATED ORAL at 22:47

## 2021-03-11 RX ADMIN — DIVALPROEX SODIUM 125 MG: 125 CAPSULE ORAL at 22:47

## 2021-03-11 ASSESSMENT — ENCOUNTER SYMPTOMS
CHILLS: 0
SHORTNESS OF BREATH: 0
DIZZINESS: 0
FEVER: 0
WEAKNESS: 1
COUGH: 0
VOMITING: 0
NAUSEA: 0
MYALGIAS: 1
ABDOMINAL PAIN: 0

## 2021-03-11 NOTE — PROGRESS NOTES
Pt is A&Ox2. Atarax given for anxiety. Pt up self with steady gait. Wound vac to LLE in place. Voiding w/o difficulty. Pt c/o LLE pain- Oxy and flexeril given PRN with +results.

## 2021-03-11 NOTE — CARE PLAN
Problem: Knowledge Deficit  Goal: Knowledge of disease process/condition, treatment plan, diagnostic tests, and medications will improve     Intervention: Explain information regarding disease process/condition, treatment plan, diagnostic tests, and medications and document in education  poc discussed- pt verbalized understanding, needs reinforcement.         Problem: Pain Management  Goal: Pain level will decrease to patient's comfort goal     Intervention: Follow pain managment plan developed in collaboration with patient and Interdisciplinary Team  Oxy and flexeril given PRN with +results. Educated pt on importance of pain control- pt verbalized understanding.

## 2021-03-12 PROCEDURE — A9270 NON-COVERED ITEM OR SERVICE: HCPCS | Performed by: NURSE PRACTITIONER

## 2021-03-12 PROCEDURE — 770001 HCHG ROOM/CARE - MED/SURG/GYN PRIV*

## 2021-03-12 PROCEDURE — 700101 HCHG RX REV CODE 250: Performed by: NURSE PRACTITIONER

## 2021-03-12 PROCEDURE — 700102 HCHG RX REV CODE 250 W/ 637 OVERRIDE(OP): Performed by: NURSE PRACTITIONER

## 2021-03-12 PROCEDURE — 700111 HCHG RX REV CODE 636 W/ 250 OVERRIDE (IP): Performed by: NURSE PRACTITIONER

## 2021-03-12 RX ADMIN — DIVALPROEX SODIUM 125 MG: 125 CAPSULE ORAL at 22:24

## 2021-03-12 RX ADMIN — GABAPENTIN 300 MG: 300 CAPSULE ORAL at 10:16

## 2021-03-12 RX ADMIN — DIVALPROEX SODIUM 125 MG: 125 CAPSULE ORAL at 05:04

## 2021-03-12 RX ADMIN — GUAIFENESIN AND DEXTROMETHORPHAN 5 ML: 100; 10 SYRUP ORAL at 18:20

## 2021-03-12 RX ADMIN — MORPHINE SULFATE 15 MG: 15 TABLET, FILM COATED, EXTENDED RELEASE ORAL at 05:05

## 2021-03-12 RX ADMIN — GABAPENTIN 300 MG: 300 CAPSULE ORAL at 18:20

## 2021-03-12 RX ADMIN — ENOXAPARIN SODIUM 40 MG: 40 INJECTION SUBCUTANEOUS at 05:05

## 2021-03-12 RX ADMIN — LIDOCAINE 1 PATCH: 50 PATCH CUTANEOUS at 10:18

## 2021-03-12 RX ADMIN — RISPERIDONE 0.5 MG: 0.5 TABLET ORAL at 05:05

## 2021-03-12 RX ADMIN — GABAPENTIN 300 MG: 300 CAPSULE ORAL at 05:04

## 2021-03-12 RX ADMIN — OXYCODONE 5 MG: 5 TABLET ORAL at 13:47

## 2021-03-12 RX ADMIN — MORPHINE SULFATE 15 MG: 15 TABLET, FILM COATED, EXTENDED RELEASE ORAL at 18:20

## 2021-03-12 RX ADMIN — RISPERIDONE 1 MG: 1 TABLET ORAL at 18:20

## 2021-03-12 RX ADMIN — OXYCODONE 5 MG: 5 TABLET ORAL at 22:24

## 2021-03-12 RX ADMIN — DIVALPROEX SODIUM 125 MG: 125 CAPSULE ORAL at 13:47

## 2021-03-12 RX ADMIN — AMLODIPINE BESYLATE 5 MG: 5 TABLET ORAL at 05:05

## 2021-03-12 RX ADMIN — CYCLOBENZAPRINE 10 MG: 10 TABLET, FILM COATED ORAL at 05:05

## 2021-03-12 ASSESSMENT — PAIN DESCRIPTION - PAIN TYPE
TYPE: ACUTE PAIN

## 2021-03-12 NOTE — PROGRESS NOTES
Hospital Medicine Twice Weekly Progress Note    Date of Service  3/11/2021    Chief Complaint  Wound Infection    Hospital Course  Mr. Varela is a 65-year-old male with PMH alcohol dependence, tobacco dependence, psychiatric disorder, and HTN who presented to the emergency department 8/7/2020 with a left lower extremity wound infection. He was hospitalized at our facility in April and evaluated by orthopedic surgery who recommended wound care. Wound cultures at that time grew MSSA and Streptococcus. He had been seen at [facility] ([place] earlier that week and prescribed antibiotics, however he had not filled the prescription.  An ultrasound of that extremity was done and was negative for DVT.  He was treated for sepsis and completed an antibiotic course on 9/16/2020. He was also found to have head lice and underwent treatment for that.  A repeat wound culture was done on 9/28/2020 and grew Strep A and Proteus. Infectious disease was consulted and recommended a 5-day course of IV zosyn which was completed on 10/5/2020. On 10/12/2020 the wound care team noticed increased inflammation to the proximal part of the wound bed and switched from a vera flow wound VAC to a regular wound VAC.  ID was again consulted and on 10/14/2020 recommended to 7-day course of antibiotics with meropenem which was completed on 10/22/2020. Additionally, he was noted to have intermittent agitation so was started on risperdal, depakote, and gabapentin per psychiatric recommendations.  On 11/20/2020 he underwent left lower extremity debridement with wound VAC placement. Since then he has remained stable.  He has been deemed incapacitated to make medical decisions and guardianship was granted on 1/29/21. Placement will likely be in a group home.     Interval Problem Update  Patient ambulating in room in no acute distress. Alert and oriented to self, place and situation. Reports pain well controlled      Consultants/Specialty  -LPS  -Psychiatry  -Infectious Disease    Code Status  Full Code    Disposition  guardianship granted 1/29/21. SW/CM may attempt to start working on placement.  Obtaining financials.    Review of Systems  Review of Systems   Constitutional: Negative for chills, fever and malaise/fatigue.   Respiratory: Negative for cough and shortness of breath.    Cardiovascular: Negative for chest pain.   Gastrointestinal: Negative for abdominal pain, nausea and vomiting.   Musculoskeletal: Positive for joint pain (controlled) and myalgias (controlled).   Neurological: Positive for weakness. Negative for dizziness.        Physical Exam  Temp:  [35.8 °C (96.5 °F)-36.4 °C (97.6 °F)] 35.8 °C (96.5 °F)  Pulse:  [70-77] 75  Resp:  [17-18] 17  BP: ()/(58-64) 99/58  SpO2:  [95 %] 95 %    Physical Exam  Vitals and nursing note reviewed.   HENT:      Head: Normocephalic and atraumatic.      Nose: Nose normal.   Eyes:      General:         Right eye: No discharge.         Left eye: No discharge.      Conjunctiva/sclera: Conjunctivae normal.      Pupils: Pupils are equal, round, and reactive to light.   Cardiovascular:      Rate and Rhythm: Normal rate.      Heart sounds: No murmur. No friction rub.   Pulmonary:      Effort: No respiratory distress.   Abdominal:      General: Abdomen is flat. There is no distension.      Tenderness: There is no abdominal tenderness. There is no guarding.   Musculoskeletal:      Cervical back: Neck supple.      Right lower leg: No edema.      Left lower leg: No edema.      Comments: LLE compression wrap dressing with negative pressure wound VAC CDI   Skin:     General: Skin is warm and dry.   Neurological:      General: No focal deficit present.      Mental Status: He is alert.   Psychiatric:         Attention and Perception: Attention normal.         Mood and Affect: Mood normal.         Speech: Speech normal.         Thought Content: Thought content is delusional.          Fluids    Intake/Output Summary (Last 24 hours) at 3/11/2021 1635  Last data filed at 3/11/2021 1500  Gross per 24 hour   Intake 960 ml   Output --   Net 960 ml       Laboratory                        Imaging  None recent    Assessment/Plan  * Wound of left leg- (present on admission)  Assessment & Plan  -Wound vac - he intermittently dislodges. Requires ongoing education  -Further management per wound care/LPS/ortho.   -Pain control. Trial of long acting, continue as needed at lower dose  -Improving slowly overall    Encephalopathy- (present on admission)  Assessment & Plan  -Improved   -Per psychiatry and Dr. Velázquez on 1/11: Patient is incapacitated.  -Public Guardian: Coretta   -Attempting placement; possibly [facility]    Psychiatric disorder- (present on admission)  Assessment & Plan  -Unspecified.  -Continue Risperdal and Depakote.  Stable on current treatment.  -Psychiatry has consulted and deemed him incapacitated to leave AMA or make medical decisions.  -Initial cognitive evaluation 8/20.   -SLP repeated Cognitive eval 1/2021- continue to recommend 24/7 supervision   -Public Guardian: Coretta   -Pending GH; obtaining financial status    Essential hypertension- (present on admission)  Assessment & Plan  -Well controlled on amlodipine    Emphysema/COPD (HCC)- (present on admission)  Assessment & Plan  -Chronic and stable off medication, without acute exacerbation    Protein malnutrition (HCC)- (present on admission)  Assessment & Plan  -Body mass index is 21.35 kg/m².   -Continue TID supplements.  -Encourage PO intake  Improving    Flexion contracture of knee, 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)  Assessment & Plan  -Secondary to chronic wound in that area.  -PT/OT.  -Does not seem to limit his mobility.   - Is frequently ambulatory.    Infection of wound due to methicillin resistant Staphylococcus aureus (MRSA)- (present on admission)  Assessment & Plan  -History of MRSA.  -Poor compliance with  OP wound care. Poor compliance with wound vac at times.   -Continue pain control as needed.  -LPS and wound care following - debridements and wound VAC changes per their recommendations  -Cultures repeated 12/14 - positive for Proteus species, pan sensitive.  Completed regimen of augmentin.          VTE prophylaxis: Ambulatory       Gisselle Sinclair A.P.R.N.

## 2021-03-13 PROCEDURE — 700102 HCHG RX REV CODE 250 W/ 637 OVERRIDE(OP): Performed by: NURSE PRACTITIONER

## 2021-03-13 PROCEDURE — A9270 NON-COVERED ITEM OR SERVICE: HCPCS | Performed by: NURSE PRACTITIONER

## 2021-03-13 PROCEDURE — 700111 HCHG RX REV CODE 636 W/ 250 OVERRIDE (IP): Performed by: NURSE PRACTITIONER

## 2021-03-13 PROCEDURE — 700101 HCHG RX REV CODE 250: Performed by: NURSE PRACTITIONER

## 2021-03-13 PROCEDURE — 97606 NEG PRS WND THER DME>50 SQCM: CPT

## 2021-03-13 PROCEDURE — 770001 HCHG ROOM/CARE - MED/SURG/GYN PRIV*

## 2021-03-13 RX ADMIN — LIDOCAINE HYDROCHLORIDE 20 ML: 20 INJECTION, SOLUTION INFILTRATION; PERINEURAL at 11:40

## 2021-03-13 RX ADMIN — GABAPENTIN 300 MG: 300 CAPSULE ORAL at 10:45

## 2021-03-13 RX ADMIN — GABAPENTIN 300 MG: 300 CAPSULE ORAL at 05:10

## 2021-03-13 RX ADMIN — MORPHINE SULFATE 15 MG: 15 TABLET, FILM COATED, EXTENDED RELEASE ORAL at 05:10

## 2021-03-13 RX ADMIN — OXYCODONE 5 MG: 5 TABLET ORAL at 05:09

## 2021-03-13 RX ADMIN — LIDOCAINE 1 PATCH: 50 PATCH CUTANEOUS at 10:45

## 2021-03-13 RX ADMIN — GABAPENTIN 300 MG: 300 CAPSULE ORAL at 17:31

## 2021-03-13 RX ADMIN — CYCLOBENZAPRINE 10 MG: 10 TABLET, FILM COATED ORAL at 17:31

## 2021-03-13 RX ADMIN — OXYCODONE 5 MG: 5 TABLET ORAL at 21:27

## 2021-03-13 RX ADMIN — DIVALPROEX SODIUM 125 MG: 125 CAPSULE ORAL at 05:10

## 2021-03-13 RX ADMIN — CYCLOBENZAPRINE 10 MG: 10 TABLET, FILM COATED ORAL at 08:08

## 2021-03-13 RX ADMIN — ENOXAPARIN SODIUM 40 MG: 40 INJECTION SUBCUTANEOUS at 05:09

## 2021-03-13 RX ADMIN — DIVALPROEX SODIUM 125 MG: 125 CAPSULE ORAL at 14:18

## 2021-03-13 RX ADMIN — MORPHINE SULFATE 15 MG: 15 TABLET, FILM COATED, EXTENDED RELEASE ORAL at 17:31

## 2021-03-13 RX ADMIN — DIVALPROEX SODIUM 125 MG: 125 CAPSULE ORAL at 21:27

## 2021-03-13 RX ADMIN — RISPERIDONE 1 MG: 1 TABLET ORAL at 17:31

## 2021-03-13 RX ADMIN — AMLODIPINE BESYLATE 5 MG: 5 TABLET ORAL at 05:10

## 2021-03-13 RX ADMIN — RISPERIDONE 0.5 MG: 0.5 TABLET ORAL at 05:09

## 2021-03-13 ASSESSMENT — PAIN DESCRIPTION - PAIN TYPE
TYPE: ACUTE PAIN
TYPE: ACUTE PAIN
TYPE: SURGICAL PAIN;CHRONIC PAIN
TYPE: ACUTE PAIN
TYPE: ACUTE PAIN;SURGICAL PAIN

## 2021-03-13 NOTE — PROGRESS NOTES
Mobility Progress Note    Surgery patient: Yes  Date of surgery: 11/19/2020  Ambulated 50 ft on day of surgery? (N/A if patient did not undergo surgery today): N/A  Number of times ambulated 50 feet or greater today: 3+, patient is up self and ambulates frequently.  Patient has been up to chair, edge of bed or HOB 90 degrees for all meals?: Yes  Goal met? (goal is ambulating at least 50 feet 2 times on day shift, one time on night shift): Day shift goal met  If patient did not meet mobility goal, why?: N/A

## 2021-03-13 NOTE — PROGRESS NOTES
Mobility Progress Note    Surgery patient: Yes  Date of surgery: 11/19/2020  Ambulated 50 ft on day of surgery? (N/A if patient did not undergo surgery today): N/A  Number of times ambulated 50 feet or greater today: 4; patient is up self and ambulates frequently.  Patient has been up to chair, edge of bed or HOB 90 degrees for all meals?: Yes  Goal met? (goal is ambulating at least 50 feet 2 times on day shift, one time on night shift): goal met  If patient did not meet mobility goal, why?: N/A

## 2021-03-13 NOTE — CARE PLAN
Problem: Safety  Goal: Will remain free from injury  Outcome: PROGRESSING AS EXPECTED  Note: Call light/belongings in reach, bed in low position and locked, siderails up x 2, pt wearing non-slip footwear, adequate lighting, clutter free environment. Educated on level of fall risk.     Problem: Pain Management  Goal: Pain level will decrease to patient's comfort goal  Outcome: PROGRESSING AS EXPECTED  Note: Administering pain mediations as ordered (see MAR).       Problem: Discharge Barriers/Planning  Goal: Patient's continuum of care needs will be met  Outcome: PROGRESSING SLOWER THAN EXPECTED  Note: Pending financial resources and pending group home placement.

## 2021-03-13 NOTE — CARE PLAN
Problem: Safety  Goal: Will remain free from injury  Outcome: PROGRESSING AS EXPECTED  Note: Call light/belongings in reach, bed in low position and locked, siderails up x 2, pt wearing non-slip footwear, adequate lighting, clutter free environment, reorienting patient as needed. Educated on level of fall risk, oriented to use of call light and encouraged pt to call before attempting to get out of bed.      Problem: Bowel/Gastric:  Goal: Normal bowel function is maintained or improved  Outcome: PROGRESSING AS EXPECTED  Flowsheets (Taken 3/13/2021 0808)  Last BM: 03/11/21     Problem: Pain Management  Goal: Pain level will decrease to patient's comfort goal  Outcome: PROGRESSING AS EXPECTED  Note: Administering pain mediations as ordered (see MAR).     Problem: Mobility  Goal: Risk for activity intolerance will decrease  Outcome: PROGRESSING AS EXPECTED  Note: Patient is up self and ambulates frequently.

## 2021-03-13 NOTE — WOUND TEAM
[facility]  Inpatient Services  Wound and Skin Care Progress Note    Admission Date: 8/7/2020     Last order of IP CONSULT TO WOUND CARE was found on 9/1/2020 from Hospital Encounter on 8/7/2020     HPI, PMH, SH: Reviewed    Unit where seen by Wound Team: [id number]    WOUND CONSULT/FOLLOW UP RELATED TO:  Left leg scheduled negative pressure wound therapy (npwt) dressing change and 2 layer compression wrap change    OBJECTIVE: NPWT dressing and 2 layer compression wrap in place. Patient on pressure redistribution mattress, up self, ambulates frequently, turns self.    WOUND TYPE, LOCATION, CHARACTERISTICS (Pressure Injuries: location, stage, POA or date identified)     Skin Risk/[place]   Sensory Perception: No Impairment  Moisture: Rarely Moist  Activity: Walks Frequently  Mobility: No Limitations  Nutrition: Adequate  Friction and Shear: No Apparent Problem  Total Score: 22  Skin Breakdown Risk: 18-23 Minimal Risk for Skin Breakdown    Sensory Interventions  Bed Types: Pressure Redistribution Mattress (Atmosair)  Skin Preventative Measures: Pillows in Use for Support / Positioning  Skin Preventative Dressing: Mepilex  Moisturizers/Barriers: Moisturizer   PT / OT Involved in Care: Physical Therapy Involved, Occupational Therapy Involved  Activity : Ambulated, Bed  Patient Turns / Repositioning: Patient Turns Self from Side to Side  Patient is Receiving Nutrition: Oral Intake Adequate  Nutrition Consult Ordered: No, Consult has Already been Placed  Vitamin Therapy in Use: No  Friction Interventions: Draw Sheet / Pad Used for Repositioning                Negative Pressure Wound Therapy 11/20/20 Leg Lateral;Posterior;Medial Left (Active)   Vacuum Serial Number [id number] 03/01/21 1000   NPWT Pump Mode / Pressure Setting Intermittent;150 mmHg 03/13/21 1300   Dressing Type Medium;Black Foam (Regular) 03/13/21 1300   Number of Foam Pieces Used 3 03/10/21 1130   Canister Changed No 03/13/21 1300   Output (mL) 425  mL 03/12/21 1829   NEXT Dressing Change/Treatment Date 03/12/21 03/11/21 1000   WOUND NURSE ONLY - Time Spent with Patient (mins) 90 03/10/21 1130           Wound 11/19/20 Full Thickness Wound Leg Lateral;Posterior;Medial Left (Active)   Wound Image    03/13/21 1218   Site Assessment Red;[place] 03/13/21 1300   Periwound Assessment Pink;Scar tissue 03/13/21 1300   Margins Defined edges 03/13/21 1300   Closure Staples 03/13/21 1300   Drainage Amount Small 03/13/21 1300   Drainage Description Serosanguineous 03/13/21 1300   Treatments Site care;Cleansed;Compression;CSWD - Conservative Sharp Wound Debridement 03/13/21 1300   Wound Cleansing Approved Wound Cleanser 03/13/21 1300   Periwound Protectant Skin Protectant Wipes to Periwound;Hydrocolloid 03/13/21 1300   Dressing Cleansing/Solutions Not Applicable 03/10/21 1130   Dressing Options Collagen Dressing;Wound Vac;Compression Wrap Two Layer 03/13/21 1300   Dressing Changed Changed 03/13/21 1300   Dressing Status Clean;Dry;Intact 03/12/21 2215   Dressing Change/Treatment Frequency Monday, Wednesday, Friday, and As Needed 03/13/21 1300   NEXT Dressing Change/Treatment Date 03/15/21 03/13/21 1300   NEXT Weekly Photo (Inpatient Only) 03/20/21 03/13/21 1300   Non-staged Wound Description Full thickness 03/13/21 1300   Wound Length (cm) 12.6 cm 02/22/21 0830   Wound Width (cm) 3 cm 02/22/21 0830   Wound Depth (cm) 0.3 cm 02/22/21 0830   Wound Surface Area (cm^2) 37.8 cm^2 02/22/21 0830   Wound Volume (cm^3) 11.34 cm^3 02/22/21 0830   Post-Procedure Length (cm) 15 cm 02/03/21 1100   Post-Procedure Width (cm) 3.4 cm 02/03/21 1100   Post-Procedure Depth (cm) 0.2 cm 02/03/21 1100   Post-Procedure Surface Area (cm^2) 51 cm^2 02/03/21 1100   Post-Procedure Volume (cm^3) 10.2 cm^3 02/03/21 1100   Wound Healing % -11 02/22/21 0830   Wound Bed Granulation (%) 80 % 03/13/21 1300   Wound Bed Slough (%) 10 % 02/27/21 1100   Wound Bed Granulation (%) - Post-Procedure 100 % 02/03/21 1100    Tunneling (cm) 0 cm 02/10/21 1400   Undermining (cm) 0 cm 02/10/21 1400   Shape irregular 03/13/21 1300   Wound Odor Foul 03/13/21 1300   Pulses Left;2+;PT;DP 03/10/21 1130   Exposed Structures None 03/13/21 1300   WOUND NURSE ONLY - Time Spent with Patient (mins) 90 03/13/21 1300                          Lab Values:    Lab Results   Component Value Date/Time    WBC 6.5 11/22/2020 02:44 AM    RBC 3.54 (L) 11/22/2020 02:44 AM    HEMOGLOBIN 9.9 (L) 11/22/2020 02:44 AM    HEMATOCRIT 30.5 (L) 11/22/2020 02:44 AM    CREACTPROT 1.07 (H) 08/12/2020 01:55 PM    SEDRATEWES 85 (H) 08/07/2020 01:20 PM    HBA1C 5.7 (H) 11/09/2018 06:30 PM      Culture Results show:  Recent Results (from the past 720 hour(s))   CULTURE WOUND W/ GRAM STAIN    Collection Time: 09/01/20  2:00 PM    Specimen: Left Leg; Wound   Result Value Ref Range    Significant Indicator POS (POS)     Source WND     Site LEFT LEG     Culture Result - (A)     Gram Stain Result       Moderate Gram positive cocci.  Moderate Gram positive rods.      Culture Result (A)      Beta Hemolytic Streptococcus group A  Moderate growth      Culture Result (A)      Methicillin Resistant Staphylococcus aureus  Moderate growth      Culture Result (A)      Diphtheroids  Moderate growth  Mixed morphologies.     KOSTAS: 9/20/21   Left.    Doppler waveforms of the common femoral artery are of high amplitude and    triphasic.    Doppler waveforms at the ankle are brisk and triphasic.    Ankle-brachial index is normal.    Unable to obtained popliteal waveforms due to wound bandages.     Self Report / Pain Level:  lidocaine to VAC sponge dwelled 12 minutes     INTERVENTIONS BY WOUND TEAM: INTERVENTIONS BY WOUND TEAM:  Performed standard wound care which includes appropriate positioning, dressing removal and non-selective debridement. Pictures and measurements obtained weekly if/when required.  Cleansed: Wound cleanser and gauze used to clean wound beds and periwound  Debridement: sharp  debridement yellow Slough  using Curette <20 cm2 debrided    Periwound - Benzoin, hydrocolloid used around entire addison and to form bridge  Primary - Agata to wound beds, 2 piece of black foam half thickness cut to fit and placed to each wound bed, 1 black piece used to bridge and form button under TRAC pad- all secured with drape, restarted NPWT 150mmHg, Intermittent 3/3 no leaks noted.   Secondary - 2 layer compression wrap foot/leg/thigh     Interdisciplinary consultation: Patient, Bedside RN, Belen WT RN    EVALUATION / RATIONALE FOR TREATMENT:  3/13/21 Wounds failing to contract.  Will trial intermittent wound vac setting to encourage greater stimulation of cellular activity in wound bed.    3/10/2021: debrided wound, odor present, continue NPWT  3/6/12 area unchanged, odor persists  3/1/21: Lidocaine unavailable during this dressing change. Small amount of debridement completed with curette. Continued with silver powder for its antimicrobial properties.   2/27/21 skin bridge enlarging  2/24/2021: Excisional debridement by LPS APRN performed for rolled edges that were starting to form along the posterior and medial left leg wound. Moderate bleeding managed by administration of lidocaine injection. CSWD performed for slough. Wound bed is beefy red following debridement. Resume agata and silver foam to manage bioburden. Continue with CSWD with each dressing change.   2/22/2021: Rolled edges starting to form along posterior thigh wound bed - may require excisional debridement by provider. Minimal changes to actual wound bed. Continue CSWD, agata, and silver foam to manage bioburden.   2/19/21: minimal to no changes from last assessment. Continue CSWD, agata, and silver foam for bioburden.  2/17/2021: Periwound remains intact. Adherent slough noted over wound bed. Scant bleeding noted on medial wound following CSWD. Continue with Agata over wound beds to further stimulate epithelization. Continue with silver foam  to manage bioburden.   2/12/2021:periwound intact does not appear macerated.  Moderate amount of drainage. Wound bed debrided.   Picture frame draped over periwound, three silver foams used.  Continue NPWT and two layer compression.    2/10/21: Periwound looks less denuded today. Did not place the arglaes powder to wound bed - switching to Vanessa to see if it will stimulate healing in his wound bed. Did not use hydrofiber to periwound but it is in the room for next visit in case it is needed. Ordered and used liquid lidocaine to remove vac dressing and lidocaine gel to the posterior proximal part of wound. Patient tolerated debridement well with lidocaine.   02/05/21: Addison wound is denuded and wet, vac drape lifting under wrap, Aquacel to dry wet and denuded skin, no silver foam available this dressing change.  02/03/21: Measurements are smaller for both medial and posterior thigh.  Wound bed has granulation and non-viable slough.  Will continue to debride every time with wound VAC changes to decrease bioburden in wound bed.   02/01/21:  No change.  Wounds appear to be improving slowly.      01/29/2021: wounds flush with periwound, fully granulated with scant yellow to a few areas. Medial periwound intact, lateral periwound with some erythema. LPS APRN suggested that next time applying brava strips to any irritated periwound may help reduce irritation.   1/27/21: Restarted NPWT, addison-wound looks better.   1/25/21: denuded tissue, vac vacation for until 1/27.  Re-assessment to place wound VAC to left LE.   1/21/21 area has decreased since last measurements.  Odor still present.  Addison skin compromised probably yeast infection under VAC drape.  Will hold VAC for 72 hours and re assess.  Nystatin to address yeast and dry out addison skin  1/15/21: position of leg may affect posterior thigh measurements.  Medial Leg wound area decreased.    01/10/2021: Wound vac replaced, patient accidentally removed vac and bedside RN was  unable to repair.  Patient tolerated wound VAC placement. Patient had moderate ss with some green tinge drainage.   01/04/2021: 2 layer compression wrap re-applied to left LE due to ACE wrap leaving too much indentation to left LE.   12/31/2020 thigh wound much narrower.  Biofilm redeveloping and odor still present.   12/27/20: Wound bed granular and odor has lessen but still persists.   12/18/20 wound length decreased.  Odor persists.    12/14/2020: re-cultured wound bed.  12/11/2020 POD 23 Length of wounds has decreased, width has not changed.  Odor persists.  Pt continues to become angry with VAC when it limits his mobility.    12/7/2020 POD 19 odor persists, improved wound bed after debridement.  Possible bacterial vs yeast vs fungal infection.  Culture taken.  Nystatin to treat possible fungal infection, silver foam to decrease microbial population.    12/4/2020 POD# 16 odor worsening, more yellow tissue present, will add nystatin and silver foam next week and consider a culture  11/30 POD#11 granular buds visible to wound bed.  Same as prior.   11/27 POD#8 wound is odorous likely related to biologic dressing.  No overt signs of infection.  Granular buds are visible through adaptic.  Edges do not appear as thick as they were prior to last sx.  Continue with current treatment.    11/23: POD#4 s/p I&D of left LE wounds and biologic placed by Dr. Olvera on 11/19.  Wound bed is flatter and raised edges scar tissue seems to be gone.   11/14 Posterior thigh aspect of wound deteriorating, will increase frequency of treatment to keep biofilm down and hopefully avoid systemic abx.  Will include thigh in compression wrap.  Will consider culture if improvement is not seen in the next few treatments.    11/10: APRN unavailable at this time.  Will re-schedule excisional debridement to next week.   11/5: Plan for debridement of scarred edges on Tues by LPS. Tendon exposed to posterior aspect of wound, behind knee. Continue  with current dressing care.  10/29: Excisional debridement by Asaf ELLER of scar tissue along the proximal edge of the posterior knee and lateral thigh.  Lateral aspect of thigh is flatten.  Medial aspect of knee has thick scar tissue that was excisionally debrided by Asaf ELLER.  Fully granulated throughout the wound bed.   10/23 wound bed mostly granular, superficial in depth, progress has been slow with the wound VAC so will trial collagen product to encourage new tissue growth.    10/19: wound bed has improved in color and is fully granulated.  Addison-wound has improved, denudation has resolved. APRN continuing with serial weekly debridements as needed.   10/16: wound friable pt now on iv abx per ID.  Indurated edges addressed with drape and foam.  Addison wound likely has yeast infection - addressing with silver powder crusting  10/14: patient seen with Asaf ELLER, stopping veraflo instillation.  Starting silver powder and regular wound VAC to see if it will help with bioburden.  Possible colonization of bacteria.  ID involved.   10/12: Inflammation noted to the proximal part of the wound bed.  Will continue to monitor, possible vac break on Wednesday to help addison-skin inflammation.   10/7: Excisional debridement performed by Asaf ELLER. Will need to continue selective debridement with NPWT changes, and weekly excisional debridement with APRN to flatten out the scar tissue.  IV abx therapy ended 10/5.   10/5: Lateral wound still with some slough, both wounds smaller per measurements. Medial wound with all granular tissue.  9/30: Patient to start abx therapy for 7 days course per Dr. Ellis.  Started dakin's instillation to veraflo  9/28: malodorous today, culture taken.    9/25: Odor noted, though unclear if from wound or pt, consider shower before next Vac change. Consider regular vac next change, wound granular and superficial.   9/23: Patient still awaiting guardianship for  placement.   9/18: wound granulating nicely and responding well to current treatment.    9/16: Wound bed with granular tissue, edges are thick but appear better than previous assessments. . NPWT VF to encourage closure by secondary intention and manage drainage while encouraging oxygenation and granulation to the wound bed. 2 layer compression to assist in decrease of swelling and encourage blood flow to wounds. Wound team to follow up and change 3x/week.      Goals: Steady decrease in wound area and depth weekly.    NURSING PLAN OF CARE ORDERS (X):    Dressing changes: See Dressing Care orders: X  Skin care: See Skin Care orders:   Rectal tube care: See Rectal Tube Care orders:   Other orders:      WOUND TEAM PLAN OF CARE:   Dressing changes by wound team:        Follow up 3 times weekly:                NPWT change 3 times weekly:  X,  NPWT changes 3x/ weekly with 2 layer compression wrap.  Follow up 1-2 times weekly:      Follow up Bi-Monthly:                   Follow up as needed:       Other (explain):     Anticipated discharge plans:  LTACH:        SNF/Rehab: X - patient will need ongoing wound care for LLE upon discharge - 3x/weekly           Home Health Care:           Outpatient Wound Center:            Self Care:

## 2021-03-14 PROCEDURE — 700101 HCHG RX REV CODE 250: Performed by: NURSE PRACTITIONER

## 2021-03-14 PROCEDURE — A9270 NON-COVERED ITEM OR SERVICE: HCPCS | Performed by: NURSE PRACTITIONER

## 2021-03-14 PROCEDURE — 700102 HCHG RX REV CODE 250 W/ 637 OVERRIDE(OP): Performed by: NURSE PRACTITIONER

## 2021-03-14 PROCEDURE — 700111 HCHG RX REV CODE 636 W/ 250 OVERRIDE (IP): Performed by: NURSE PRACTITIONER

## 2021-03-14 PROCEDURE — 99231 SBSQ HOSP IP/OBS SF/LOW 25: CPT | Performed by: NURSE PRACTITIONER

## 2021-03-14 PROCEDURE — 770001 HCHG ROOM/CARE - MED/SURG/GYN PRIV*

## 2021-03-14 RX ORDER — METHOCARBAMOL 500 MG/1
500 TABLET, FILM COATED ORAL 4 TIMES DAILY
Status: DISCONTINUED | OUTPATIENT
Start: 2021-03-14 | End: 2021-05-03 | Stop reason: HOSPADM

## 2021-03-14 RX ADMIN — AMLODIPINE BESYLATE 5 MG: 5 TABLET ORAL at 06:10

## 2021-03-14 RX ADMIN — GABAPENTIN 300 MG: 300 CAPSULE ORAL at 06:10

## 2021-03-14 RX ADMIN — DIVALPROEX SODIUM 125 MG: 125 CAPSULE ORAL at 06:10

## 2021-03-14 RX ADMIN — GABAPENTIN 300 MG: 300 CAPSULE ORAL at 18:16

## 2021-03-14 RX ADMIN — METHOCARBAMOL TABLETS 500 MG: 500 TABLET, COATED ORAL at 18:15

## 2021-03-14 RX ADMIN — DIVALPROEX SODIUM 125 MG: 125 CAPSULE ORAL at 21:53

## 2021-03-14 RX ADMIN — METHOCARBAMOL TABLETS 500 MG: 500 TABLET, COATED ORAL at 21:53

## 2021-03-14 RX ADMIN — MORPHINE SULFATE 15 MG: 15 TABLET, FILM COATED, EXTENDED RELEASE ORAL at 18:16

## 2021-03-14 RX ADMIN — OXYCODONE 5 MG: 5 TABLET ORAL at 12:59

## 2021-03-14 RX ADMIN — LIDOCAINE 1 PATCH: 50 PATCH CUTANEOUS at 11:51

## 2021-03-14 RX ADMIN — OXYCODONE 5 MG: 5 TABLET ORAL at 06:35

## 2021-03-14 RX ADMIN — CYCLOBENZAPRINE 10 MG: 10 TABLET, FILM COATED ORAL at 12:59

## 2021-03-14 RX ADMIN — RISPERIDONE 0.5 MG: 0.5 TABLET ORAL at 06:11

## 2021-03-14 RX ADMIN — MORPHINE SULFATE 15 MG: 15 TABLET, FILM COATED, EXTENDED RELEASE ORAL at 06:11

## 2021-03-14 RX ADMIN — RISPERIDONE 1 MG: 1 TABLET ORAL at 18:16

## 2021-03-14 RX ADMIN — DIVALPROEX SODIUM 125 MG: 125 CAPSULE ORAL at 12:59

## 2021-03-14 RX ADMIN — GABAPENTIN 300 MG: 300 CAPSULE ORAL at 11:52

## 2021-03-14 RX ADMIN — ENOXAPARIN SODIUM 40 MG: 40 INJECTION SUBCUTANEOUS at 06:10

## 2021-03-14 ASSESSMENT — ENCOUNTER SYMPTOMS
CHILLS: 0
NAUSEA: 0
FEVER: 0
WEAKNESS: 1
SHORTNESS OF BREATH: 0
ABDOMINAL PAIN: 0
COUGH: 0
VOMITING: 0
MYALGIAS: 1
DIZZINESS: 0

## 2021-03-14 ASSESSMENT — PAIN DESCRIPTION - PAIN TYPE
TYPE: CHRONIC PAIN;SURGICAL PAIN
TYPE: CHRONIC PAIN
TYPE: CHRONIC PAIN;SURGICAL PAIN

## 2021-03-14 NOTE — PROGRESS NOTES
"Hospital Medicine Twice Weekly Progress Note    Date of Service  3/14/2021    Chief Complaint  Wound Infection    Hospital Course  Mr. Varela is a 65-year-old male with PMH alcohol dependence, tobacco dependence, psychiatric disorder, and HTN who presented to the emergency department 8/7/2020 with a left lower extremity wound infection. He was hospitalized at our facility in April and evaluated by orthopedic surgery who recommended wound care. Wound cultures at that time grew MSSA and Streptococcus. He had been seen at [facility], [place] earlier that week and prescribed antibiotics, however he had not filled the prescription.  An ultrasound of that extremity was done and was negative for DVT.  He was treated for sepsis and completed an antibiotic course on 9/16/2020. He was also found to have head lice and underwent treatment for that.  A repeat wound culture was done on 9/28/2020 and grew Strep A and Proteus. Infectious disease was consulted and recommended a 5-day course of IV zosyn which was completed on 10/5/2020. On 10/12/2020 the wound care team noticed increased inflammation to the proximal part of the wound bed and switched from a vera flow wound VAC to a regular wound VAC.  ID was again consulted and on 10/14/2020 recommended to 7-day course of antibiotics with meropenem which was completed on 10/22/2020. Additionally, he was noted to have intermittent agitation so was started on risperdal, depakote, and gabapentin per psychiatric recommendations.  On 11/20/2020 he underwent left lower extremity debridement with wound VAC placement. Since then he has remained stable.  He has been deemed incapacitated to make medical decisions and guardianship was granted on 1/29/21. Placement will likely be in a group home.     Interval Problem Update  Patient reports uncontrolled LLE pain. He said , \"they keep telling me it will get better with time, but nothing is getting better\". He describes the pain as \"cramping\" and " "\"spasms\". Currently has Flexeril PRN. Will trial Robaxin and see if pain control improves     Consultants/Specialty  -LPS  -Psychiatry  -Infectious Disease    Code Status  Full Code    Disposition  guardianship granted 1/29/21. SW/CM may attempt to start working on placement.  Obtaining financials.    Review of Systems  Review of Systems   Constitutional: Negative for chills, fever and malaise/fatigue.   Respiratory: Negative for cough and shortness of breath.    Cardiovascular: Negative for chest pain.   Gastrointestinal: Negative for abdominal pain, nausea and vomiting.   Musculoskeletal: Positive for joint pain and myalgias.   Neurological: Positive for weakness. Negative for dizziness.        Physical Exam  Temp:  [36.4 °C (97.5 °F)-37.1 °C (98.7 °F)] 36.4 °C (97.5 °F)  Pulse:  [65-70] 65  Resp:  [16-18] 16  BP: (103-122)/(68-82) 122/80  SpO2:  [95 %-99 %] 96 %    Physical Exam  Vitals and nursing note reviewed.   HENT:      Head: Normocephalic and atraumatic.      Nose: Nose normal.   Eyes:      General:         Right eye: No discharge.         Left eye: No discharge.      Conjunctiva/sclera: Conjunctivae normal.      Pupils: Pupils are equal, round, and reactive to light.   Cardiovascular:      Rate and Rhythm: Normal rate.   Pulmonary:      Effort: Pulmonary effort is normal. No respiratory distress.   Abdominal:      General: Abdomen is flat. There is no distension.      Tenderness: There is no abdominal tenderness. There is no guarding.   Musculoskeletal:      Cervical back: Neck supple.      Right lower leg: No edema.      Left lower leg: No edema.      Comments: LLE with decreased ROM and tenderness to palpation.negative pressure wound VAC in place and functioning appropriately          Skin:     General: Skin is warm and dry.   Neurological:      General: No focal deficit present.      Mental Status: He is alert.   Psychiatric:         Attention and Perception: Attention normal.         Mood and Affect: " Mood normal.         Speech: Speech normal.         Behavior: Behavior is cooperative.         Cognition and Memory: Cognition is impaired. Memory is impaired.         Fluids    Intake/Output Summary (Last 24 hours) at 3/14/2021 1331  Last data filed at 3/13/2021 2127  Gross per 24 hour   Intake 480 ml   Output --   Net 480 ml       Laboratory                        Imaging  None recent    Assessment/Plan  * Wound of left leg- (present on admission)  Assessment & Plan  -Wound vac - he intermittently dislodges. Requires ongoing education  -Further management per wound care/LPS/ortho.   -Pain control. Trial of long acting, continue as needed at lower dose  -Improving slowly overall    Encephalopathy- (present on admission)  Assessment & Plan  -Improved   -Per psychiatry and Dr. Velázquez on 1/11: Patient is incapacitated.  -Public Guardian: Coretta   -Attempting placement; possibly [facility]    Psychiatric disorder- (present on admission)  Assessment & Plan  -Unspecified.  -Continue Risperdal and Depakote.  Stable on current treatment.  -Psychiatry has consulted and deemed him incapacitated to leave [place] or make medical decisions.  -Initial cognitive evaluation 8/20.   -SLP repeated Cognitive eval 1/2021- continue to recommend 24/7 supervision   -Public Guardian: Coretta   -Pending GH; obtaining financial status    Essential hypertension- (present on admission)  Assessment & Plan  -Well controlled on amlodipine    Emphysema/COPD (HCC)- (present on admission)  Assessment & Plan  -Chronic and stable off medication, without acute exacerbation    Protein malnutrition (HCC)- (present on admission)  Assessment & Plan  -Body mass index is 21.35 kg/m².   -Continue TID supplements.  -Encourage PO intake  Improving    Flexion contracture of knee, 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)  Assessment & Plan  -Secondary to chronic wound in that area.  -PT/OT.  -Does not seem to limit his mobility. Is frequently  ambulatory.  -Continue PRN flexeril . Robaxin added 3/14     Infection of wound due to methicillin resistant Staphylococcus aureus (MRSA)- (present on admission)  Assessment & Plan  -History of MRSA.  -Poor compliance with OP wound care. Poor compliance with wound vac at times.   -Continue pain control as needed.  -LPS and wound care following - debridements and wound VAC changes per their recommendations  -Cultures repeated 12/14 - positive for Proteus species, pan sensitive.  Completed regimen of augmentin.          VTE prophylaxis: Ambulatory       Gisselle Sinclair A.P.R.N.      I have performed a physical exam and reviewed and updated ROS and Plan today (3/14/2021). In review of previous note,  there are no changes except as documented above.      None

## 2021-03-14 NOTE — PROGRESS NOTES
Mobility Progress Note    Surgery patient: Yes  Date of surgery: 11/19/2020  Ambulated 50 ft on day of surgery? (N/A if patient did not undergo surgery today): N/A  Number of times ambulated 50 feet or greater today: 5  Patient has been up to chair, edge of bed or HOB 90 degrees for all meals?: Yes  Goal met? (goal is ambulating at least 50 feet 2 times on day shift, one time on night shift): Day shift goal has been met  If patient did not meet mobility goal, why?: N/A

## 2021-03-14 NOTE — CARE PLAN
Problem: Safety  Goal: Will remain free from injury  Outcome: PROGRESSING AS EXPECTED     Problem: Infection  Goal: Will remain free from infection  Outcome: PROGRESSING AS EXPECTED    Problem: Pain Management  Goal: Pain level will decrease to patient's comfort goal  Outcome: PROGRESSING AS EXPECTED     Problem: Psychosocial Needs:  Goal: Level of anxiety will decrease  Outcome: PROGRESSING AS EXPECTED

## 2021-03-14 NOTE — WOUND TEAM
Wound team consulted for wound vac placement.  Patient removed wound VAC this AM around 6:30am.  Per notes and AM bedside RNSanjuana patient had refused NOC RN to repair VAC.  This AM  Bedside RNSanjuana was able to repair wound VAC prior to wound care RN arrival to bedside.  Wound VAC checked and wound VAC is fully functioning and maintains 150mmHg, no leaks noted.  Will continue with wound vac and WT will change dressing tomorrow.

## 2021-03-14 NOTE — CARE PLAN
Problem: Safety  Goal: Will remain free from injury  Outcome: PROGRESSING AS EXPECTED  Safety precautions are in place including bed locked and in lowest position, upper bed rails up, call light within reach, shoes on, tray table within reach.      Problem: Mobility  Goal: Risk for activity intolerance will decrease  Outcome: PROGRESSING AS EXPECTED  Patient walks frequently with wound vac in place on a rolling IV pole. Patient is up self in room & ambulates freely to/from bathroom & other areas.

## 2021-03-15 PROCEDURE — 700102 HCHG RX REV CODE 250 W/ 637 OVERRIDE(OP): Performed by: NURSE PRACTITIONER

## 2021-03-15 PROCEDURE — 770001 HCHG ROOM/CARE - MED/SURG/GYN PRIV*

## 2021-03-15 PROCEDURE — A9270 NON-COVERED ITEM OR SERVICE: HCPCS | Performed by: NURSE PRACTITIONER

## 2021-03-15 PROCEDURE — 700101 HCHG RX REV CODE 250: Performed by: NURSE PRACTITIONER

## 2021-03-15 PROCEDURE — 700111 HCHG RX REV CODE 636 W/ 250 OVERRIDE (IP): Performed by: NURSE PRACTITIONER

## 2021-03-15 PROCEDURE — 97606 NEG PRS WND THER DME>50 SQCM: CPT

## 2021-03-15 RX ADMIN — RISPERIDONE 1 MG: 1 TABLET ORAL at 16:12

## 2021-03-15 RX ADMIN — METHOCARBAMOL TABLETS 500 MG: 500 TABLET, COATED ORAL at 21:22

## 2021-03-15 RX ADMIN — LIDOCAINE 1 PATCH: 50 PATCH CUTANEOUS at 13:11

## 2021-03-15 RX ADMIN — METHOCARBAMOL TABLETS 500 MG: 500 TABLET, COATED ORAL at 13:11

## 2021-03-15 RX ADMIN — OXYCODONE 5 MG: 5 TABLET ORAL at 15:15

## 2021-03-15 RX ADMIN — DIVALPROEX SODIUM 125 MG: 125 CAPSULE ORAL at 13:11

## 2021-03-15 RX ADMIN — OXYCODONE 5 MG: 5 TABLET ORAL at 21:22

## 2021-03-15 RX ADMIN — GABAPENTIN 300 MG: 300 CAPSULE ORAL at 16:12

## 2021-03-15 RX ADMIN — MORPHINE SULFATE 15 MG: 15 TABLET, FILM COATED, EXTENDED RELEASE ORAL at 06:29

## 2021-03-15 RX ADMIN — GABAPENTIN 300 MG: 300 CAPSULE ORAL at 13:11

## 2021-03-15 RX ADMIN — METHOCARBAMOL TABLETS 500 MG: 500 TABLET, COATED ORAL at 06:29

## 2021-03-15 RX ADMIN — MORPHINE SULFATE 15 MG: 15 TABLET, FILM COATED, EXTENDED RELEASE ORAL at 16:12

## 2021-03-15 RX ADMIN — ENOXAPARIN SODIUM 40 MG: 40 INJECTION SUBCUTANEOUS at 06:29

## 2021-03-15 RX ADMIN — METHOCARBAMOL TABLETS 500 MG: 500 TABLET, COATED ORAL at 16:12

## 2021-03-15 RX ADMIN — RISPERIDONE 0.5 MG: 0.5 TABLET ORAL at 06:29

## 2021-03-15 RX ADMIN — GABAPENTIN 300 MG: 300 CAPSULE ORAL at 06:29

## 2021-03-15 RX ADMIN — DIVALPROEX SODIUM 125 MG: 125 CAPSULE ORAL at 06:29

## 2021-03-15 RX ADMIN — DIVALPROEX SODIUM 125 MG: 125 CAPSULE ORAL at 21:22

## 2021-03-15 RX ADMIN — CYCLOBENZAPRINE 10 MG: 10 TABLET, FILM COATED ORAL at 15:15

## 2021-03-15 RX ADMIN — AMLODIPINE BESYLATE 5 MG: 5 TABLET ORAL at 06:29

## 2021-03-15 ASSESSMENT — PAIN DESCRIPTION - PAIN TYPE
TYPE: CHRONIC PAIN;SURGICAL PAIN
TYPE: SURGICAL PAIN;CHRONIC PAIN
TYPE: SURGICAL PAIN;CHRONIC PAIN

## 2021-03-15 NOTE — CARE PLAN
Problem: Knowledge Deficit  Goal: Knowledge of disease process/condition, treatment plan, diagnostic tests, and medications will improve  Outcome: PROGRESSING AS EXPECTED  Note: POC discussed, frequent redirection needed      Problem: Discharge Barriers/Planning  Goal: Patient's continuum of care needs will be met  Outcome: PROGRESSING AS EXPECTED  Note: Pending placement      Problem: Mobility  Goal: Risk for activity intolerance will decrease  Outcome: PROGRESSING AS EXPECTED  Note: Patient ambulates frequently

## 2021-03-15 NOTE — WOUND TEAM
[facility]  Inpatient Services  Wound and Skin Care Progress Note    Admission Date: 8/7/2020     Last order of IP CONSULT TO WOUND CARE was found on 9/1/2020 from Hospital Encounter on 8/7/2020     HPI, PMH, SH: Reviewed    Unit where seen by Wound Team: [id number]    WOUND CONSULT/FOLLOW UP RELATED TO:  Left leg scheduled negative pressure wound therapy (npwt) dressing change and 2 layer compression wrap change    OBJECTIVE: NPWT dressing and 2 layer compression wrap in place. Patient on pressure redistribution mattress, up self, ambulates frequently, turns self.    WOUND TYPE, LOCATION, CHARACTERISTICS (Pressure Injuries: location, stage, POA or date identified)      Negative Pressure Wound Therapy 11/20/20 Leg Lateral;Posterior;Medial Left (Active)   Vacuum Serial Number [id number] 03/01/21 1000   NPWT Pump Mode / Pressure Setting Ulta;Continuous;125 mmHg 03/15/21 1600   Dressing Type Medium;Black Foam (Regular) 03/15/21 1600   Number of Foam Pieces Used 3 03/15/21 1600   Canister Changed No 03/15/21 1600   Output (mL) 425 mL 03/12/21 1829   NEXT Dressing Change/Treatment Date 03/17/21 03/15/21 1600   WOUND NURSE ONLY - Time Spent with Patient (mins) 90 03/10/21 1130       Wound 11/19/20 Full Thickness Wound Leg Lateral;Posterior;Medial Left (Active)   Wound Image    03/13/21 1218   Site Assessment Red 03/15/21 1600   Periwound Assessment Clean;Dry;Intact 03/15/21 1600   Margins Attached edges 03/15/21 1600   Closure Secondary intention 03/15/21 1600   Drainage Amount Moderate 03/15/21 1600   Drainage Description Serosanguineous 03/15/21 1600   Treatments Cleansed;CSWD - Conservative Sharp Wound Debridement;Site care;Compression 03/15/21 1600   Wound Cleansing Approved Wound Cleanser 03/15/21 1600   Periwound Protectant Skin Protectant Wipes to Periwound;Hydrocolloid;Drape 03/15/21 1600   Dressing Cleansing/Solutions Not Applicable 03/15/21 1600   Dressing Options Collagen Dressing;Wound Vac;Compression  Wrap Two Layer 03/15/21 1600   Dressing Changed Changed 03/15/21 1600   Dressing Status Clean;Dry;Intact 03/15/21 1600   Dressing Change/Treatment Frequency Monday, Wednesday, Friday, and As Needed 03/15/21 1600   NEXT Dressing Change/Treatment Date 03/17/21 03/15/21 1600   NEXT Weekly Photo (Inpatient Only) 03/20/21 03/13/21 1300   Non-staged Wound Description Full thickness 03/15/21 1600   Wound Length (cm) 12.6 cm 02/22/21 0830   Wound Width (cm) 3 cm 02/22/21 0830   Wound Depth (cm) 0.3 cm 02/22/21 0830   Wound Surface Area (cm^2) 37.8 cm^2 02/22/21 0830   Wound Volume (cm^3) 11.34 cm^3 02/22/21 0830   Post-Procedure Length (cm) 15 cm 02/03/21 1100   Post-Procedure Width (cm) 3.4 cm 02/03/21 1100   Post-Procedure Depth (cm) 0.2 cm 02/03/21 1100   Post-Procedure Surface Area (cm^2) 51 cm^2 02/03/21 1100   Post-Procedure Volume (cm^3) 10.2 cm^3 02/03/21 1100   Wound Healing % -11 02/22/21 0830   Wound Bed Granulation (%) 80 % 03/13/21 1300   Wound Bed Slough (%) 10 % 02/27/21 1100   Wound Bed Granulation (%) - Post-Procedure 100 % 02/03/21 1100   Tunneling (cm) 0 cm 02/10/21 1400   Undermining (cm) 0 cm 02/10/21 1400   Shape irregular 03/15/21 1600   Wound Odor Strong 03/15/21 1600   Pulses Left;2+;DP;PT 03/15/21 1600   Exposed Structures None 03/15/21 1600   WOUND NURSE ONLY - Time Spent with Patient (mins) 60 03/15/21 1600                    Lab Values:    Lab Results   Component Value Date/Time    WBC 6.5 11/22/2020 02:44 AM    RBC 3.54 (L) 11/22/2020 02:44 AM    HEMOGLOBIN 9.9 (L) 11/22/2020 02:44 AM    HEMATOCRIT 30.5 (L) 11/22/2020 02:44 AM    CREACTPROT 1.07 (H) 08/12/2020 01:55 PM    SEDRATEWES 85 (H) 08/07/2020 01:20 PM    HBA1C 5.7 (H) 11/09/2018 06:30 PM      Culture Results show:  Recent Results (from the past 720 hour(s))   CULTURE WOUND W/ GRAM STAIN    Collection Time: 09/01/20  2:00 PM    Specimen: Left Leg; Wound   Result Value Ref Range    Significant Indicator POS (POS)     Source WND     Site  LEFT LEG     Culture Result - (A)     Gram Stain Result       Moderate Gram positive cocci.  Moderate Gram positive rods.      Culture Result (A)      Beta Hemolytic Streptococcus group A  Moderate growth      Culture Result (A)      Methicillin Resistant Staphylococcus aureus  Moderate growth      Culture Result (A)      Diphtheroids  Moderate growth  Mixed morphologies.     KOSTAS: 9/20/21   Left.    Doppler waveforms of the common femoral artery are of high amplitude and    triphasic.    Doppler waveforms at the ankle are brisk and triphasic.    Ankle-brachial index is normal.    Unable to obtained popliteal waveforms due to wound bandages.     Self Report / Pain Level:  lidocaine to VAC sponge dwelled 12 minutes     INTERVENTIONS BY WOUND TEAM: INTERVENTIONS BY WOUND TEAM:  Performed standard wound care which includes appropriate positioning, dressing removal and non-selective debridement. Pictures and measurements obtained weekly if/when required.  Cleansed: Wound cleanser and gauze used to clean wound beds and periwound  Debridement: sharp debridement yellow Slough  using Curette <20 cm2 debrided    Periwound - Benzoin, hydrocolloid used around entire addison and to form bridge  Primary - Vanessa to wound beds, 2 piece of black foam half thickness cut to fit and placed to each wound bed, 1 black piece used to bridge and form button under TRAC pad- all secured with drape, restarted NPWT 150mmHg, Intermittent 3/3 no leaks noted.   Secondary - 2 layer compression wrap foot/leg/thigh     Interdisciplinary consultation: Patient, Bedside RN (Sanjuana), Wound care RN Zack    EVALUATION / RATIONALE FOR TREATMENT:  3/13/21 Wounds failing to contract.  Will trial intermittent wound vac setting to encourage greater stimulation of cellular activity in wound bed.    3/10/2021: debrided wound, odor present, continue NPWT  3/6/12 area unchanged, odor persists  3/1/21: Lidocaine unavailable during this dressing change. Small amount of  debridement completed with curette. Continued with silver powder for its antimicrobial properties.   2/27/21 skin bridge enlarging  2/24/2021: Excisional debridement by LPS APRN performed for rolled edges that were starting to form along the posterior and medial left leg wound. Moderate bleeding managed by administration of lidocaine injection. CSWD performed for slough. Wound bed is beefy red following debridement. Resume agata and silver foam to manage bioburden. Continue with CSWD with each dressing change.   2/22/2021: Rolled edges starting to form along posterior thigh wound bed - may require excisional debridement by provider. Minimal changes to actual wound bed. Continue CSWD, agata, and silver foam to manage bioburden.   2/19/21: minimal to no changes from last assessment. Continue CSWD, agata, and silver foam for bioburden.  2/17/2021: Periwound remains intact. Adherent slough noted over wound bed. Scant bleeding noted on medial wound following CSWD. Continue with Agata over wound beds to further stimulate epithelization. Continue with silver foam to manage bioburden.   2/12/2021:periwound intact does not appear macerated.  Moderate amount of drainage. Wound bed debrided.   Picture frame draped over periwound, three silver foams used.  Continue NPWT and two layer compression.    2/10/21: Periwound looks less denuded today. Did not place the arglaes powder to wound bed - switching to Agata to see if it will stimulate healing in his wound bed. Did not use hydrofiber to periwound but it is in the room for next visit in case it is needed. Ordered and used liquid lidocaine to remove vac dressing and lidocaine gel to the posterior proximal part of wound. Patient tolerated debridement well with lidocaine.   02/05/21: Meredith wound is denuded and wet, vac drape lifting under wrap, Aquacel to dry wet and denuded skin, no silver foam available this dressing change.  02/03/21: Measurements are smaller for both  medial and posterior thigh.  Wound bed has granulation and non-viable slough.  Will continue to debride every time with wound VAC changes to decrease bioburden in wound bed.   02/01/21:  No change.  Wounds appear to be improving slowly.      01/29/2021: wounds flush with periwound, fully granulated with scant yellow to a few areas. Medial periwound intact, lateral periwound with some erythema. LPS APRN suggested that next time applying brava strips to any irritated periwound may help reduce irritation.   1/27/21: Restarted NPWT, addison-wound looks better.   1/25/21: denuded tissue, vac vacation for until 1/27.  Re-assessment to place wound VAC to left LE.   1/21/21 area has decreased since last measurements.  Odor still present.  Addison skin compromised probably yeast infection under VAC drape.  Will hold VAC for 72 hours and re assess.  Nystatin to address yeast and dry out addison skin  1/15/21: position of leg may affect posterior thigh measurements.  Medial Leg wound area decreased.    01/10/2021: Wound vac replaced, patient accidentally removed vac and bedside RN was unable to repair.  Patient tolerated wound VAC placement. Patient had moderate ss with some green tinge drainage.   01/04/2021: 2 layer compression wrap re-applied to left LE due to ACE wrap leaving too much indentation to left LE.   12/31/2020 thigh wound much narrower.  Biofilm redeveloping and odor still present.   12/27/20: Wound bed granular and odor has lessen but still persists.   12/18/20 wound length decreased.  Odor persists.    12/14/2020: re-cultured wound bed.  12/11/2020 POD 23 Length of wounds has decreased, width has not changed.  Odor persists.  Pt continues to become angry with VAC when it limits his mobility.    12/7/2020 POD 19 odor persists, improved wound bed after debridement.  Possible bacterial vs yeast vs fungal infection.  Culture taken.  Nystatin to treat possible fungal infection, silver foam to decrease microbial population.     12/4/2020 POD# 16 odor worsening, more yellow tissue present, will add nystatin and silver foam next week and consider a culture  11/30 POD#11 granular buds visible to wound bed.  Same as prior.   11/27 POD#8 wound is odorous likely related to biologic dressing.  No overt signs of infection.  Granular buds are visible through adaptic.  Edges do not appear as thick as they were prior to last sx.  Continue with current treatment.    11/23: POD#4 s/p I&D of left LE wounds and biologic placed by Dr. Olvera on 11/19.  Wound bed is flatter and raised edges scar tissue seems to be gone.   11/14 Posterior thigh aspect of wound deteriorating, will increase frequency of treatment to keep biofilm down and hopefully avoid systemic abx.  Will include thigh in compression wrap.  Will consider culture if improvement is not seen in the next few treatments.    11/10: APRN unavailable at this time.  Will re-schedule excisional debridement to next week.   11/5: Plan for debridement of scarred edges on Tues by LPS. Tendon exposed to posterior aspect of wound, behind knee. Continue with current dressing care.  10/29: Excisional debridement by Asaf ELLER of scar tissue along the proximal edge of the posterior knee and lateral thigh.  Lateral aspect of thigh is flatten.  Medial aspect of knee has thick scar tissue that was excisionally debrided by Asaf ELLER.  Fully granulated throughout the wound bed.   10/23 wound bed mostly granular, superficial in depth, progress has been slow with the wound VAC so will trial collagen product to encourage new tissue growth.    10/19: wound bed has improved in color and is fully granulated.  Meredith-wound has improved, denudation has resolved. APRN continuing with serial weekly debridements as needed.   10/16: wound friable pt now on iv abx per ID.  Indurated edges addressed with drape and foam.  Meredith wound likely has yeast infection - addressing with silver powder crusting  10/14:  patient seen with Asaf ELLER, stopping veraflo instillation.  Starting silver powder and regular wound VAC to see if it will help with bioburden.  Possible colonization of bacteria.  ID involved.   10/12: Inflammation noted to the proximal part of the wound bed.  Will continue to monitor, possible vac break on Wednesday to help addison-skin inflammation.   10/7: Excisional debridement performed by Asaf ELLER. Will need to continue selective debridement with NPWT changes, and weekly excisional debridement with APRN to flatten out the scar tissue.  IV abx therapy ended 10/5.   10/5: Lateral wound still with some slough, both wounds smaller per measurements. Medial wound with all granular tissue.  9/30: Patient to start abx therapy for 7 days course per Dr. Ellis.  Started dakin's instillation to veraflo  9/28: malodorous today, culture taken.    9/25: Odor noted, though unclear if from wound or pt, consider shower before next Vac change. Consider regular vac next change, wound granular and superficial.   9/23: Patient still awaiting guardianship for placement.   9/18: wound granulating nicely and responding well to current treatment.    9/16: Wound bed with granular tissue, edges are thick but appear better than previous assessments. . NPWT VF to encourage closure by secondary intention and manage drainage while encouraging oxygenation and granulation to the wound bed. 2 layer compression to assist in decrease of swelling and encourage blood flow to wounds. Wound team to follow up and change 3x/week.      Goals: Steady decrease in wound area and depth weekly.    NURSING PLAN OF CARE ORDERS (X):    Dressing changes: See Dressing Care orders: X  Skin care: See Skin Care orders:   Rectal tube care: See Rectal Tube Care orders:   Other orders:      WOUND TEAM PLAN OF CARE:   Dressing changes by wound team:        Follow up 3 times weekly:                NPWT change 3 times weekly:  X,  NPWT changes 3x/  weekly with 2 layer compression wrap.  Follow up 1-2 times weekly:      Follow up Bi-Monthly:                   Follow up as needed:       Other (explain):     Anticipated discharge plans:  LTACH:        SNF/Rehab: X - patient will need ongoing wound care for LLE upon discharge - 3x/weekly           Home Health Care:           Outpatient Wound Center:            Self Care:

## 2021-03-15 NOTE — CARE PLAN
Problem: Mobility  Goal: Risk for activity intolerance will decrease  Outcome: PROGRESSING AS EXPECTED  Patient is up self in room & hallway. Patient has no issues with ambulation. Patient must walk with wound vac on IV pole.      Problem: Fluid Volume:  Goal: Will maintain balanced intake and output  Outcome: PROGRESSING AS EXPECTED  Patient has balanced I&Os, adequate PO intake and voids without complications.

## 2021-03-16 PROCEDURE — A9270 NON-COVERED ITEM OR SERVICE: HCPCS | Performed by: NURSE PRACTITIONER

## 2021-03-16 PROCEDURE — 700111 HCHG RX REV CODE 636 W/ 250 OVERRIDE (IP): Performed by: NURSE PRACTITIONER

## 2021-03-16 PROCEDURE — 700102 HCHG RX REV CODE 250 W/ 637 OVERRIDE(OP): Performed by: NURSE PRACTITIONER

## 2021-03-16 PROCEDURE — 700101 HCHG RX REV CODE 250: Performed by: NURSE PRACTITIONER

## 2021-03-16 PROCEDURE — 770001 HCHG ROOM/CARE - MED/SURG/GYN PRIV*

## 2021-03-16 RX ADMIN — GABAPENTIN 300 MG: 300 CAPSULE ORAL at 05:43

## 2021-03-16 RX ADMIN — METHOCARBAMOL TABLETS 500 MG: 500 TABLET, COATED ORAL at 21:44

## 2021-03-16 RX ADMIN — ENOXAPARIN SODIUM 40 MG: 40 INJECTION SUBCUTANEOUS at 05:43

## 2021-03-16 RX ADMIN — MORPHINE SULFATE 15 MG: 15 TABLET, FILM COATED, EXTENDED RELEASE ORAL at 05:43

## 2021-03-16 RX ADMIN — DIVALPROEX SODIUM 125 MG: 125 CAPSULE ORAL at 05:43

## 2021-03-16 RX ADMIN — AMLODIPINE BESYLATE 5 MG: 5 TABLET ORAL at 05:42

## 2021-03-16 RX ADMIN — DIVALPROEX SODIUM 125 MG: 125 CAPSULE ORAL at 21:44

## 2021-03-16 RX ADMIN — METHOCARBAMOL TABLETS 500 MG: 500 TABLET, COATED ORAL at 05:43

## 2021-03-16 RX ADMIN — RISPERIDONE 1 MG: 1 TABLET ORAL at 17:37

## 2021-03-16 RX ADMIN — DIVALPROEX SODIUM 125 MG: 125 CAPSULE ORAL at 13:21

## 2021-03-16 RX ADMIN — METHOCARBAMOL TABLETS 500 MG: 500 TABLET, COATED ORAL at 17:37

## 2021-03-16 RX ADMIN — OXYCODONE 5 MG: 5 TABLET ORAL at 21:47

## 2021-03-16 RX ADMIN — METHOCARBAMOL TABLETS 500 MG: 500 TABLET, COATED ORAL at 13:21

## 2021-03-16 RX ADMIN — OXYCODONE 5 MG: 5 TABLET ORAL at 13:25

## 2021-03-16 RX ADMIN — GABAPENTIN 300 MG: 300 CAPSULE ORAL at 17:37

## 2021-03-16 RX ADMIN — MORPHINE SULFATE 15 MG: 15 TABLET, FILM COATED, EXTENDED RELEASE ORAL at 17:38

## 2021-03-16 RX ADMIN — GABAPENTIN 300 MG: 300 CAPSULE ORAL at 13:21

## 2021-03-16 RX ADMIN — LIDOCAINE 1 PATCH: 50 PATCH CUTANEOUS at 13:21

## 2021-03-16 RX ADMIN — RISPERIDONE 0.5 MG: 0.5 TABLET ORAL at 05:43

## 2021-03-16 ASSESSMENT — PAIN DESCRIPTION - PAIN TYPE
TYPE: ACUTE PAIN

## 2021-03-16 NOTE — CARE PLAN
Problem: Safety  Goal: Will remain free from injury  Outcome: PROGRESSING AS EXPECTED  Note: Bed is locked and in lowest position, personal belongings are within reach, room is free of clutter and spills, call light is in place. Patient educated on how to use the call light and how to call for assistance. Patient verbalized understanding.       Problem: Mobility  Goal: Risk for activity intolerance will decrease  Outcome: PROGRESSING AS EXPECTED  Note: Mobility Progress Note    Surgery patient: no  Date of surgery: n/a  Ambulated 50 ft on day of surgery? (N/A if patient did not undergo surgery today): n/a  Number of times ambulated 50 feet or greater today: 3  Patient has been up to chair, edge of bed or HOB 90 degrees for all meals?: yes  Goal met? (goal is ambulating at least 50 feet 2 times on day shift, one time on night shift): yes  If patient did not meet mobility goal, why?: n/a

## 2021-03-16 NOTE — CARE PLAN
Problem: Safety  Goal: Will remain free from injury  Outcome: PROGRESSING AS EXPECTED   Bed locked and in lowest position. Treaded slipper socks on. Call light within reach. Pt educated to call for assistance. Hourly checks in place. Wound vac in place and functioning.      Problem: Venous Thromboembolism (VTW)/Deep Vein Thrombosis (DVT) Prevention:  Goal: Patient will participate in Venous Thrombosis (VTE)/Deep Vein Thrombosis (DVT)Prevention Measures  Outcome: PROGRESSING AS EXPECTED  Patient ambulates often. Patient also receiving Lovenox injections.

## 2021-03-17 PROCEDURE — 700111 HCHG RX REV CODE 636 W/ 250 OVERRIDE (IP): Performed by: NURSE PRACTITIONER

## 2021-03-17 PROCEDURE — 700102 HCHG RX REV CODE 250 W/ 637 OVERRIDE(OP): Performed by: NURSE PRACTITIONER

## 2021-03-17 PROCEDURE — A9270 NON-COVERED ITEM OR SERVICE: HCPCS | Performed by: NURSE PRACTITIONER

## 2021-03-17 PROCEDURE — 700101 HCHG RX REV CODE 250: Performed by: NURSE PRACTITIONER

## 2021-03-17 PROCEDURE — 97606 NEG PRS WND THER DME>50 SQCM: CPT

## 2021-03-17 PROCEDURE — 770001 HCHG ROOM/CARE - MED/SURG/GYN PRIV*

## 2021-03-17 RX ADMIN — RISPERIDONE 0.5 MG: 0.5 TABLET ORAL at 05:40

## 2021-03-17 RX ADMIN — MORPHINE SULFATE 15 MG: 15 TABLET, FILM COATED, EXTENDED RELEASE ORAL at 05:41

## 2021-03-17 RX ADMIN — METHOCARBAMOL TABLETS 500 MG: 500 TABLET, COATED ORAL at 05:41

## 2021-03-17 RX ADMIN — RISPERIDONE 1 MG: 1 TABLET ORAL at 17:23

## 2021-03-17 RX ADMIN — OXYCODONE 5 MG: 5 TABLET ORAL at 14:28

## 2021-03-17 RX ADMIN — METHOCARBAMOL TABLETS 500 MG: 500 TABLET, COATED ORAL at 12:36

## 2021-03-17 RX ADMIN — OXYCODONE 5 MG: 5 TABLET ORAL at 21:36

## 2021-03-17 RX ADMIN — GABAPENTIN 300 MG: 300 CAPSULE ORAL at 17:23

## 2021-03-17 RX ADMIN — ENOXAPARIN SODIUM 40 MG: 40 INJECTION SUBCUTANEOUS at 05:41

## 2021-03-17 RX ADMIN — GABAPENTIN 300 MG: 300 CAPSULE ORAL at 05:41

## 2021-03-17 RX ADMIN — GABAPENTIN 300 MG: 300 CAPSULE ORAL at 12:36

## 2021-03-17 RX ADMIN — AMLODIPINE BESYLATE 5 MG: 5 TABLET ORAL at 05:41

## 2021-03-17 RX ADMIN — DIVALPROEX SODIUM 125 MG: 125 CAPSULE ORAL at 05:41

## 2021-03-17 RX ADMIN — DIVALPROEX SODIUM 125 MG: 125 CAPSULE ORAL at 21:28

## 2021-03-17 RX ADMIN — DIVALPROEX SODIUM 125 MG: 125 CAPSULE ORAL at 12:36

## 2021-03-17 RX ADMIN — MORPHINE SULFATE 15 MG: 15 TABLET, FILM COATED, EXTENDED RELEASE ORAL at 17:23

## 2021-03-17 RX ADMIN — LIDOCAINE 1 PATCH: 50 PATCH CUTANEOUS at 12:36

## 2021-03-17 RX ADMIN — METHOCARBAMOL TABLETS 500 MG: 500 TABLET, COATED ORAL at 21:28

## 2021-03-17 RX ADMIN — METHOCARBAMOL TABLETS 500 MG: 500 TABLET, COATED ORAL at 17:23

## 2021-03-17 ASSESSMENT — PAIN DESCRIPTION - PAIN TYPE
TYPE: ACUTE PAIN

## 2021-03-17 NOTE — WOUND TEAM
Debridement :  curette used to debride wound bed of left LE medial and Left posterior thigh. Entire surface of wound, <20.0cm2, debrided, removing non viable tissue.

## 2021-03-17 NOTE — CARE PLAN
Problem: Safety  Goal: Will remain free from injury  Outcome: PROGRESSING AS EXPECTED  Note: Non-slip socks are on, bed is locked and in lowest position, personal belongings are within reach, room is free of clutter and spills, call light is in place. Patient educated on how to use the call light and how to call for assistance. Patient verbalized understanding.       Problem: Mobility  Goal: Risk for activity intolerance will decrease  Outcome: PROGRESSING AS EXPECTED  Note: Mobility Progress Note    Surgery patient: no  Date of surgery: n/a  Ambulated 50 ft on day of surgery? (N/A if patient did not undergo surgery today): n/a  Number of times ambulated 50 feet or greater today: 3  Patient has been up to chair, edge of bed or HOB 90 degrees for all meals?: yes  Goal met? (goal is ambulating at least 50 feet 2 times on day shift, one time on night shift): yes  If patient did not meet mobility goal, why?:

## 2021-03-17 NOTE — CARE PLAN
Problem: Safety  Goal: Will remain free from injury  Outcome: PROGRESSING AS EXPECTED   Provide fall education. Pt is steady, up self in room.   Problem: Pain Management  Goal: Pain level will decrease to patient's comfort goal  Outcome: PROGRESSING AS EXPECTED   Pain is well managed on oral and scheduled medication.

## 2021-03-17 NOTE — PROGRESS NOTES
Mobility Progress Note    Surgery patient: No  Date of surgery: n/a  Ambulated 50 ft on day of surgery? (N/A if patient did not undergo surgery today): n/a  Number of times ambulated 50 feet or greater today: 3  Patient has been up to chair, edge of bed or HOB 90 degrees for all meals?: Yes  Goal met? (goal is ambulating at least 50 feet 2 times on day shift, one time on night shift): Yes  If patient did not meet mobility goal, why?: Pt up self in room, steady.

## 2021-03-17 NOTE — WOUND TEAM
[facility]  Inpatient Services  Wound and Skin Care Progress Note    Admission Date: 8/7/2020     Last order of IP CONSULT TO WOUND CARE was found on 9/1/2020 from Hospital Encounter on 8/7/2020     HPI, PMH, SH: Reviewed    Unit where seen by Wound Team: [id number]    WOUND CONSULT/FOLLOW UP RELATED TO:  Left leg scheduled negative pressure wound therapy (npwt) dressing change and 2 layer compression wrap change    OBJECTIVE: NPWT dressing and 2 layer compression wrap in place. Patient on pressure redistribution mattress, up self, ambulates frequently, turns self.    WOUND TYPE, LOCATION, CHARACTERISTICS (Pressure Injuries: location, stage, POA or date identified)        Negative Pressure Wound Therapy 11/20/20 Leg Lateral;Posterior;Medial Left (Active)   Vacuum Serial Number [id number] 03/01/21 1000   NPWT Pump Mode / Pressure Setting Intermittent;150 mmHg 03/17/21 1500   Dressing Type Medium;Black Foam (Regular) 03/17/21 1500   Number of Foam Pieces Used 3 03/17/21 1500   Canister Changed No 03/17/21 1500   NEXT Dressing Change/Treatment Date 03/19/21 03/17/21 1500   WOUND NURSE ONLY - Time Spent with Patient (mins) 90 03/17/21 1500           Wound 11/19/20 Full Thickness Wound Leg Lateral;Posterior;Medial Left (Active)   Wound Image    03/17/21 1500   Site Assessment Red;Yellow;Bleeding 03/17/21 1500   Periwound Assessment Scar tissue 03/17/21 1500   Margins Attached edges;Epibole (rolled edges) 03/17/21 1500   Closure Secondary intention 03/17/21 1500   Drainage Amount Moderate 03/17/21 1500   Drainage Description Serosanguineous 03/17/21 1500   Treatments Cleansed;Site care;CSWD - Conservative Sharp Wound Debridement 03/17/21 1500   Wound Cleansing Approved Wound Cleanser 03/17/21 1500   Periwound Protectant Skin Protectant Wipes to Periwound;Hydrocolloid 03/17/21 1500   Dressing Cleansing/Solutions Not Applicable 03/17/21 1500   Dressing Options Wound Vac 03/17/21 1500   Dressing Changed Changed  03/17/21 1500   Dressing Status Clean;Dry;Intact 03/17/21 1500   Dressing Change/Treatment Frequency Monday, Wednesday, Friday, and As Needed 03/17/21 1500   NEXT Dressing Change/Treatment Date 03/19/21 03/17/21 1500   NEXT Weekly Photo (Inpatient Only) 03/24/21 03/17/21 1500   Non-staged Wound Description Full thickness 03/17/21 1500   Wound Length (cm) 12.6 cm 02/22/21 0830   Wound Width (cm) 3 cm 02/22/21 0830   Wound Depth (cm) 0.3 cm 03/17/21 1500   Wound Surface Area (cm^2) 37.8 cm^2 02/22/21 0830   Wound Volume (cm^3) 11.34 cm^3 02/22/21 0830   Post-Procedure Length (cm) 15 cm 02/03/21 1100   Post-Procedure Width (cm) 3.4 cm 02/03/21 1100   Post-Procedure Depth (cm) 0.2 cm 02/03/21 1100   Post-Procedure Surface Area (cm^2) 51 cm^2 02/03/21 1100   Post-Procedure Volume (cm^3) 10.2 cm^3 02/03/21 1100   Wound Healing % -11 02/22/21 0830   Wound Bed Granulation (%) 90 % 03/17/21 1500   Wound Bed Slough (%) 10 % 03/17/21 1500   Wound Bed Granulation (%) - Post-Procedure 100 % 03/17/21 1500   Tunneling (cm) 0 cm 02/10/21 1400   Undermining (cm) 0 cm 02/10/21 1400   Shape irregular 03/17/21 1500   Wound Odor None 03/17/21 1500   Pulses Left;2+;DP;PT 03/15/21 1600   Exposed Structures None 03/17/21 1500   WOUND NURSE ONLY - Time Spent with Patient (mins) 90 03/17/21 1500             Lab Values:    Lab Results   Component Value Date/Time    WBC 6.5 11/22/2020 02:44 AM    RBC 3.54 (L) 11/22/2020 02:44 AM    HEMOGLOBIN 9.9 (L) 11/22/2020 02:44 AM    HEMATOCRIT 30.5 (L) 11/22/2020 02:44 AM    CREACTPROT 1.07 (H) 08/12/2020 01:55 PM    SEDRATEWES 85 (H) 08/07/2020 01:20 PM    HBA1C 5.7 (H) 11/09/2018 06:30 PM      Culture Results show:  Recent Results (from the past 720 hour(s))   CULTURE WOUND W/ GRAM STAIN    Collection Time: 09/01/20  2:00 PM    Specimen: Left Leg; Wound   Result Value Ref Range    Significant Indicator POS (POS)     Source WND     Site LEFT LEG     Culture Result - (A)     Gram Stain Result        Moderate Gram positive cocci.  Moderate Gram positive rods.      Culture Result (A)      Beta Hemolytic Streptococcus group A  Moderate growth      Culture Result (A)      Methicillin Resistant Staphylococcus aureus  Moderate growth      Culture Result (A)      Diphtheroids  Moderate growth  Mixed morphologies.     KOSTAS: 9/20/21   Left.    Doppler waveforms of the common femoral artery are of high amplitude and    triphasic.    Doppler waveforms at the ankle are brisk and triphasic.    Ankle-brachial index is normal.    Unable to obtained popliteal waveforms due to wound bandages.     Self Report / Pain Level:  Pt reported pain with dressing removal and debridement but denied pain for the rest of the procedure.     INTERVENTIONS BY WOUND TEAM: INTERVENTIONS BY WOUND TEAM:  Performed standard wound care which includes appropriate positioning, dressing removal and non-selective debridement. Pictures and measurements obtained weekly if/when required.  Cleansed: Wound cleanser and gauze used to clean wound beds and periwound  Debridement: sharp debridement of yellow Slough using Curette <20 cm2 performed by EKATERINA Zamora    Periwound - Skin protectant swabs and hydrocolloid used around wound perimeter and to form bridge  Primary - Vanessa to wound beds, 2 piece of black foam half thickness cut to fit and placed to each wound bed and 1 black piece used to bridge- all secured with drape, restarted NPWT 150mmHg, Intermittent 3/3 no leaks noted.   Secondary - 2 layer compression wrap foot/leg/thigh     Interdisciplinary consultation: Patient, Bedside RN (Makeda), Wound care RN Sera    EVALUATION / RATIONALE FOR TREATMENT:  3/13/21 Wounds failing to contract.  Will trial intermittent wound vac setting to encourage greater stimulation of cellular activity in wound bed.    3/10/2021: debrided wound, odor present, continue NPWT  3/6/12 area unchanged, odor persists  3/1/21: Lidocaine unavailable during this dressing change. Small  amount of debridement completed with curette. Continued with silver powder for its antimicrobial properties.   2/27/21 skin bridge enlarging  2/24/2021: Excisional debridement by LPS APRN performed for rolled edges that were starting to form along the posterior and medial left leg wound. Moderate bleeding managed by administration of lidocaine injection. CSWD performed for slough. Wound bed is beefy red following debridement. Resume agata and silver foam to manage bioburden. Continue with CSWD with each dressing change.   2/22/2021: Rolled edges starting to form along posterior thigh wound bed - may require excisional debridement by provider. Minimal changes to actual wound bed. Continue CSWD, agata, and silver foam to manage bioburden.   2/19/21: minimal to no changes from last assessment. Continue CSWD, agata, and silver foam for bioburden.  2/17/2021: Periwound remains intact. Adherent slough noted over wound bed. Scant bleeding noted on medial wound following CSWD. Continue with Agata over wound beds to further stimulate epithelization. Continue with silver foam to manage bioburden.   2/12/2021:periwound intact does not appear macerated.  Moderate amount of drainage. Wound bed debrided.   Picture frame draped over periwound, three silver foams used.  Continue NPWT and two layer compression.    2/10/21: Periwound looks less denuded today. Did not place the arglaes powder to wound bed - switching to Agata to see if it will stimulate healing in his wound bed. Did not use hydrofiber to periwound but it is in the room for next visit in case it is needed. Ordered and used liquid lidocaine to remove vac dressing and lidocaine gel to the posterior proximal part of wound. Patient tolerated debridement well with lidocaine.   02/05/21: Meredith wound is denuded and wet, vac drape lifting under wrap, Aquacel to dry wet and denuded skin, no silver foam available this dressing change.  02/03/21: Measurements are smaller for  both medial and posterior thigh.  Wound bed has granulation and non-viable slough.  Will continue to debride every time with wound VAC changes to decrease bioburden in wound bed.   02/01/21:  No change.  Wounds appear to be improving slowly.      01/29/2021: wounds flush with periwound, fully granulated with scant yellow to a few areas. Medial periwound intact, lateral periwound with some erythema. LPS APRN suggested that next time applying brava strips to any irritated periwound may help reduce irritation.   1/27/21: Restarted NPWT, addison-wound looks better.   1/25/21: denuded tissue, vac vacation for until 1/27.  Re-assessment to place wound VAC to left LE.   1/21/21 area has decreased since last measurements.  Odor still present.  Addison skin compromised probably yeast infection under VAC drape.  Will hold VAC for 72 hours and re assess.  Nystatin to address yeast and dry out addison skin  1/15/21: position of leg may affect posterior thigh measurements.  Medial Leg wound area decreased.    01/10/2021: Wound vac replaced, patient accidentally removed vac and bedside RN was unable to repair.  Patient tolerated wound VAC placement. Patient had moderate ss with some green tinge drainage.   01/04/2021: 2 layer compression wrap re-applied to left LE due to ACE wrap leaving too much indentation to left LE.   12/31/2020 thigh wound much narrower.  Biofilm redeveloping and odor still present.   12/27/20: Wound bed granular and odor has lessen but still persists.   12/18/20 wound length decreased.  Odor persists.    12/14/2020: re-cultured wound bed.  12/11/2020 POD 23 Length of wounds has decreased, width has not changed.  Odor persists.  Pt continues to become angry with VAC when it limits his mobility.    12/7/2020 POD 19 odor persists, improved wound bed after debridement.  Possible bacterial vs yeast vs fungal infection.  Culture taken.  Nystatin to treat possible fungal infection, silver foam to decrease microbial  population.    12/4/2020 POD# 16 odor worsening, more yellow tissue present, will add nystatin and silver foam next week and consider a culture  11/30 POD#11 granular buds visible to wound bed.  Same as prior.   11/27 POD#8 wound is odorous likely related to biologic dressing.  No overt signs of infection.  Granular buds are visible through adaptic.  Edges do not appear as thick as they were prior to last sx.  Continue with current treatment.    11/23: POD#4 s/p I&D of left LE wounds and biologic placed by Dr. Olvera on 11/19.  Wound bed is flatter and raised edges scar tissue seems to be gone.   11/14 Posterior thigh aspect of wound deteriorating, will increase frequency of treatment to keep biofilm down and hopefully avoid systemic abx.  Will include thigh in compression wrap.  Will consider culture if improvement is not seen in the next few treatments.    11/10: APRN unavailable at this time.  Will re-schedule excisional debridement to next week.   11/5: Plan for debridement of scarred edges on Tues by LPS. Tendon exposed to posterior aspect of wound, behind knee. Continue with current dressing care.  10/29: Excisional debridement by Asaf ELLER of scar tissue along the proximal edge of the posterior knee and lateral thigh.  Lateral aspect of thigh is flatten.  Medial aspect of knee has thick scar tissue that was excisionally debrided by Asaf ELLER.  Fully granulated throughout the wound bed.   10/23 wound bed mostly granular, superficial in depth, progress has been slow with the wound VAC so will trial collagen product to encourage new tissue growth.    10/19: wound bed has improved in color and is fully granulated.  Meredith-wound has improved, denudation has resolved. APRN continuing with serial weekly debridements as needed.   10/16: wound friable pt now on iv abx per ID.  Indurated edges addressed with drape and foam.  Meredith wound likely has yeast infection - addressing with silver powder  crusting  10/14: patient seen with Asaf ELLER, stopping veraflo instillation.  Starting silver powder and regular wound VAC to see if it will help with bioburden.  Possible colonization of bacteria.  ID involved.   10/12: Inflammation noted to the proximal part of the wound bed.  Will continue to monitor, possible vac break on Wednesday to help addison-skin inflammation.   10/7: Excisional debridement performed by Aasf ELLER. Will need to continue selective debridement with NPWT changes, and weekly excisional debridement with APRN to flatten out the scar tissue.  IV abx therapy ended 10/5.   10/5: Lateral wound still with some slough, both wounds smaller per measurements. Medial wound with all granular tissue.  9/30: Patient to start abx therapy for 7 days course per Dr. Ellis.  Started dakin's instillation to veraflo  9/28: malodorous today, culture taken.    9/25: Odor noted, though unclear if from wound or pt, consider shower before next Vac change. Consider regular vac next change, wound granular and superficial.   9/23: Patient still awaiting guardianship for placement.   9/18: wound granulating nicely and responding well to current treatment.    9/16: Wound bed with granular tissue, edges are thick but appear better than previous assessments. . NPWT VF to encourage closure by secondary intention and manage drainage while encouraging oxygenation and granulation to the wound bed. 2 layer compression to assist in decrease of swelling and encourage blood flow to wounds. Wound team to follow up and change 3x/week.      Goals: Steady decrease in wound area and depth weekly.    NURSING PLAN OF CARE ORDERS (X):    Dressing changes: See Dressing Care orders: X  Skin care: See Skin Care orders:   Rectal tube care: See Rectal Tube Care orders:   Other orders:      WOUND TEAM PLAN OF CARE:   Dressing changes by wound team:        Follow up 3 times weekly:                NPWT change 3 times weekly:  X,   NPWT changes 3x/ weekly with 2 layer compression wrap.  Follow up 1-2 times weekly:      Follow up Bi-Monthly:                   Follow up as needed:       Other (explain):     Anticipated discharge plans:  LTACH:        SNF/Rehab: X - patient will need ongoing wound care for LLE upon discharge - 3x/weekly           Home Health Care:           Outpatient Wound Center:            Self Care:

## 2021-03-17 NOTE — PROGRESS NOTES
Mobility Progress Note    Surgery patient: Yes  Date of surgery: 11/19/2020    Ambulated 50 ft on day of surgery? (N/A if patient did not undergo surgery today): N/A  Number of times ambulated 50 feet or greater today: 5  Patient has been up to chair, edge of bed or HOB 90 degrees for all meals?: Yes  Goal met? (goal is ambulating at least 50 feet 2 times on day shift, one time on night shift): Yes  If patient did not meet mobility goal, why?: Patient ambulates self frequently throughout day.

## 2021-03-18 LAB
BASOPHILS # BLD AUTO: 0.8 % (ref 0–1.8)
BASOPHILS # BLD: 0.05 K/UL (ref 0–0.12)
EOSINOPHIL # BLD AUTO: 0.13 K/UL (ref 0–0.51)
EOSINOPHIL NFR BLD: 2.2 % (ref 0–6.9)
ERYTHROCYTE [DISTWIDTH] IN BLOOD BY AUTOMATED COUNT: 45.1 FL (ref 35.9–50)
HCT VFR BLD AUTO: 34.2 % (ref 42–52)
HGB BLD-MCNC: 11.2 G/DL (ref 14–18)
IMM GRANULOCYTES # BLD AUTO: 0.02 K/UL (ref 0–0.11)
IMM GRANULOCYTES NFR BLD AUTO: 0.3 % (ref 0–0.9)
LYMPHOCYTES # BLD AUTO: 1.51 K/UL (ref 1–4.8)
LYMPHOCYTES NFR BLD: 25.6 % (ref 22–41)
MCH RBC QN AUTO: 26.7 PG (ref 27–33)
MCHC RBC AUTO-ENTMCNC: 32.7 G/DL (ref 33.7–35.3)
MCV RBC AUTO: 81.6 FL (ref 81.4–97.8)
MONOCYTES # BLD AUTO: 1.11 K/UL (ref 0–0.85)
MONOCYTES NFR BLD AUTO: 18.8 % (ref 0–13.4)
NEUTROPHILS # BLD AUTO: 3.08 K/UL (ref 1.82–7.42)
NEUTROPHILS NFR BLD: 52.3 % (ref 44–72)
NRBC # BLD AUTO: 0 K/UL
NRBC BLD-RTO: 0 /100 WBC
PLATELET # BLD AUTO: 383 K/UL (ref 164–446)
PMV BLD AUTO: 8.4 FL (ref 9–12.9)
RBC # BLD AUTO: 4.19 M/UL (ref 4.7–6.1)
WBC # BLD AUTO: 5.9 K/UL (ref 4.8–10.8)

## 2021-03-18 PROCEDURE — A9270 NON-COVERED ITEM OR SERVICE: HCPCS | Performed by: NURSE PRACTITIONER

## 2021-03-18 PROCEDURE — 700102 HCHG RX REV CODE 250 W/ 637 OVERRIDE(OP): Performed by: NURSE PRACTITIONER

## 2021-03-18 PROCEDURE — 700101 HCHG RX REV CODE 250: Performed by: NURSE PRACTITIONER

## 2021-03-18 PROCEDURE — 700111 HCHG RX REV CODE 636 W/ 250 OVERRIDE (IP): Performed by: NURSE PRACTITIONER

## 2021-03-18 PROCEDURE — 770001 HCHG ROOM/CARE - MED/SURG/GYN PRIV*

## 2021-03-18 PROCEDURE — 85025 COMPLETE CBC W/AUTO DIFF WBC: CPT

## 2021-03-18 PROCEDURE — 99231 SBSQ HOSP IP/OBS SF/LOW 25: CPT | Performed by: NURSE PRACTITIONER

## 2021-03-18 PROCEDURE — 36415 COLL VENOUS BLD VENIPUNCTURE: CPT

## 2021-03-18 RX ADMIN — LIDOCAINE 1 PATCH: 50 PATCH CUTANEOUS at 13:36

## 2021-03-18 RX ADMIN — RISPERIDONE 1 MG: 1 TABLET ORAL at 16:46

## 2021-03-18 RX ADMIN — METHOCARBAMOL TABLETS 500 MG: 500 TABLET, COATED ORAL at 13:37

## 2021-03-18 RX ADMIN — GABAPENTIN 300 MG: 300 CAPSULE ORAL at 16:46

## 2021-03-18 RX ADMIN — METHOCARBAMOL TABLETS 500 MG: 500 TABLET, COATED ORAL at 05:12

## 2021-03-18 RX ADMIN — AMLODIPINE BESYLATE 5 MG: 5 TABLET ORAL at 05:11

## 2021-03-18 RX ADMIN — RISPERIDONE 0.5 MG: 0.5 TABLET ORAL at 05:12

## 2021-03-18 RX ADMIN — HYDROXYZINE HYDROCHLORIDE 25 MG: 50 TABLET, FILM COATED ORAL at 00:21

## 2021-03-18 RX ADMIN — OXYCODONE 5 MG: 5 TABLET ORAL at 09:51

## 2021-03-18 RX ADMIN — GABAPENTIN 300 MG: 300 CAPSULE ORAL at 13:37

## 2021-03-18 RX ADMIN — MORPHINE SULFATE 15 MG: 15 TABLET, FILM COATED, EXTENDED RELEASE ORAL at 05:12

## 2021-03-18 RX ADMIN — GABAPENTIN 300 MG: 300 CAPSULE ORAL at 05:10

## 2021-03-18 RX ADMIN — DIVALPROEX SODIUM 125 MG: 125 CAPSULE ORAL at 20:22

## 2021-03-18 RX ADMIN — OXYCODONE 5 MG: 5 TABLET ORAL at 20:22

## 2021-03-18 RX ADMIN — MORPHINE SULFATE 15 MG: 15 TABLET, FILM COATED, EXTENDED RELEASE ORAL at 16:46

## 2021-03-18 RX ADMIN — DIVALPROEX SODIUM 125 MG: 125 CAPSULE ORAL at 13:36

## 2021-03-18 RX ADMIN — METHOCARBAMOL TABLETS 500 MG: 500 TABLET, COATED ORAL at 20:22

## 2021-03-18 RX ADMIN — DIVALPROEX SODIUM 125 MG: 125 CAPSULE ORAL at 05:11

## 2021-03-18 RX ADMIN — METHOCARBAMOL TABLETS 500 MG: 500 TABLET, COATED ORAL at 16:46

## 2021-03-18 ASSESSMENT — ENCOUNTER SYMPTOMS
VOMITING: 0
MYALGIAS: 1
NAUSEA: 0
FEVER: 0
DIARRHEA: 0
CHILLS: 0
SHORTNESS OF BREATH: 0
SORE THROAT: 0
DIZZINESS: 0
COUGH: 0
HEADACHES: 0
ABDOMINAL PAIN: 0

## 2021-03-18 ASSESSMENT — PAIN DESCRIPTION - PAIN TYPE
TYPE: ACUTE PAIN
TYPE: ACUTE PAIN

## 2021-03-18 NOTE — PROGRESS NOTES
Mobility Progress Note    Surgery patient: no  Date of surgery: n/a  Ambulated 50 ft on day of surgery? (N/A if patient did not undergo surgery today): n/a  Number of times ambulated 50 feet or greater today: 2  Patient has been up to chair, edge of bed or HOB 90 degrees for all meals?: Yes  Goal met? (goal is ambulating at least 50 feet 2 times on day shift, one time on night shift): Yes  If patient did not meet mobility goal, why?: Pt up self.

## 2021-03-18 NOTE — CARE PLAN
Problem: Infection  Goal: Will remain free from infection  Outcome: PROGRESSING AS EXPECTED   Pt has wound vac to LLE, wound team following. Nurse ensure the machine is working properly, monitor for leakage or malfunction.   Problem: Discharge Barriers/Planning  Goal: Patient's continuum of care needs will be met  Outcome: PROGRESSING AS EXPECTED   Pt awaiting group home placement.

## 2021-03-18 NOTE — CARE PLAN
Problem: Safety  Goal: Will remain free from injury  Outcome: PROGRESSING AS EXPECTED   Fall precautions in place. Educated patient to call for assistance.     Problem: Pain Management  Goal: Pain level will decrease to patient's comfort goal  Outcome: PROGRESSING AS EXPECTED   Patient complaining of left leg pain. Medication administered.    Problem: Psychosocial Needs:  Goal: Level of anxiety will decrease  Outcome: PROGRESSING AS EXPECTED

## 2021-03-18 NOTE — PROGRESS NOTES
Received bedside report and assumed care of patient. Patient is A&Ox3 and ambulating in room. Patient showing no signs of distress, complaints of left leg pain, and is on RA. Fall precautions are in place. Call light within reach, bed in low position, 2 side rails up, and belongings are within reach.  Patient was updated on Plan of Care, no questions or concerns. Pt educated on use of call light for assistance. Will continue to monitor.

## 2021-03-19 LAB
GRAM STN SPEC: NORMAL
SIGNIFICANT IND 70042: NORMAL
SITE SITE: NORMAL
SOURCE SOURCE: NORMAL

## 2021-03-19 PROCEDURE — 700101 HCHG RX REV CODE 250: Performed by: NURSE PRACTITIONER

## 2021-03-19 PROCEDURE — 97597 DBRDMT OPN WND 1ST 20 CM/<: CPT

## 2021-03-19 PROCEDURE — A9270 NON-COVERED ITEM OR SERVICE: HCPCS | Performed by: NURSE PRACTITIONER

## 2021-03-19 PROCEDURE — 700102 HCHG RX REV CODE 250 W/ 637 OVERRIDE(OP): Performed by: NURSE PRACTITIONER

## 2021-03-19 PROCEDURE — 87205 SMEAR GRAM STAIN: CPT

## 2021-03-19 PROCEDURE — 770001 HCHG ROOM/CARE - MED/SURG/GYN PRIV*

## 2021-03-19 PROCEDURE — 97598 DBRDMT OPN WND ADDL 20CM/<: CPT

## 2021-03-19 PROCEDURE — 87070 CULTURE OTHR SPECIMN AEROBIC: CPT

## 2021-03-19 PROCEDURE — 700111 HCHG RX REV CODE 636 W/ 250 OVERRIDE (IP): Performed by: NURSE PRACTITIONER

## 2021-03-19 RX ADMIN — OXYCODONE 5 MG: 5 TABLET ORAL at 21:13

## 2021-03-19 RX ADMIN — AMLODIPINE BESYLATE 5 MG: 5 TABLET ORAL at 04:40

## 2021-03-19 RX ADMIN — MORPHINE SULFATE 15 MG: 15 TABLET, FILM COATED, EXTENDED RELEASE ORAL at 04:40

## 2021-03-19 RX ADMIN — GABAPENTIN 300 MG: 300 CAPSULE ORAL at 14:15

## 2021-03-19 RX ADMIN — RISPERIDONE 0.5 MG: 0.5 TABLET ORAL at 04:40

## 2021-03-19 RX ADMIN — RISPERIDONE 1 MG: 1 TABLET ORAL at 18:21

## 2021-03-19 RX ADMIN — DIVALPROEX SODIUM 125 MG: 125 CAPSULE ORAL at 04:40

## 2021-03-19 RX ADMIN — LIDOCAINE 1 PATCH: 50 PATCH CUTANEOUS at 14:15

## 2021-03-19 RX ADMIN — DIVALPROEX SODIUM 125 MG: 125 CAPSULE ORAL at 14:14

## 2021-03-19 RX ADMIN — ENOXAPARIN SODIUM 40 MG: 40 INJECTION SUBCUTANEOUS at 04:40

## 2021-03-19 RX ADMIN — MORPHINE SULFATE 15 MG: 15 TABLET, FILM COATED, EXTENDED RELEASE ORAL at 18:22

## 2021-03-19 RX ADMIN — CYCLOBENZAPRINE 10 MG: 10 TABLET, FILM COATED ORAL at 22:34

## 2021-03-19 RX ADMIN — METHOCARBAMOL TABLETS 500 MG: 500 TABLET, COATED ORAL at 04:40

## 2021-03-19 RX ADMIN — METHOCARBAMOL TABLETS 500 MG: 500 TABLET, COATED ORAL at 21:13

## 2021-03-19 RX ADMIN — HYDROXYZINE HYDROCHLORIDE 25 MG: 50 TABLET, FILM COATED ORAL at 21:13

## 2021-03-19 RX ADMIN — METHOCARBAMOL TABLETS 500 MG: 500 TABLET, COATED ORAL at 18:20

## 2021-03-19 RX ADMIN — METHOCARBAMOL TABLETS 500 MG: 500 TABLET, COATED ORAL at 14:14

## 2021-03-19 RX ADMIN — OXYCODONE 5 MG: 5 TABLET ORAL at 10:27

## 2021-03-19 RX ADMIN — DIVALPROEX SODIUM 125 MG: 125 CAPSULE ORAL at 21:13

## 2021-03-19 RX ADMIN — OXYCODONE 5 MG: 5 TABLET ORAL at 04:40

## 2021-03-19 RX ADMIN — GABAPENTIN 300 MG: 300 CAPSULE ORAL at 18:22

## 2021-03-19 RX ADMIN — GABAPENTIN 300 MG: 300 CAPSULE ORAL at 04:40

## 2021-03-19 ASSESSMENT — PAIN DESCRIPTION - PAIN TYPE
TYPE: CHRONIC PAIN
TYPE: ACUTE PAIN
TYPE: CHRONIC PAIN
TYPE: ACUTE PAIN

## 2021-03-19 NOTE — PROGRESS NOTES
[facility] Medicine Twice Weekly Progress Note    Date of Service  3/14/2021    Chief Complaint  Wound Infection    Hospital Course  Mr. Varela is a 65-year-old male with PMH alcohol dependence, tobacco dependence, psychiatric disorder, and HTN who presented to the emergency department 8/7/2020 with a left lower extremity wound infection. He was hospitalized at our facility in April and evaluated by orthopedic surgery who recommended wound care. Wound cultures at that time grew MSSA and Streptococcus. He had been seen at [facility] earlier that week and prescribed antibiotics, however he had not filled the prescription.  An ultrasound of that extremity was done and was negative for DVT.  He was treated for sepsis and completed an antibiotic course on 9/16/2020. He was also found to have head lice and underwent treatment for that.  A repeat wound culture was done on 9/28/2020 and grew Strep A and Proteus. Infectious disease was consulted and recommended a 5-day course of IV zosyn which was completed on 10/5/2020. On 10/12/2020 the wound care team noticed increased inflammation to the proximal part of the wound bed and switched from a vera flow wound VAC to a regular wound VAC.  ID was again consulted and on 10/14/2020 recommended to 7-day course of antibiotics with meropenem which was completed on 10/22/2020. Additionally, he was noted to have intermittent agitation so was started on risperdal, depakote, and gabapentin per psychiatric recommendations.  On 11/20/2020 he underwent left lower extremity debridement with wound VAC placement. Since then he has remained stable.  He has been deemed incapacitated to make medical decisions and guardianship was granted on 1/29/21. Placement will likely be in a group home.     Interval Problem Update  Patient ambulatory in room, pleasant and cooperative.  He is oriented only to himself and situation.  He agrees his pain is well managed on current regimen.  He denies any  "other problems.  Wound care team reports wound odor and \"failing to contract.\"   -CBC with differential to evaluate for possible infection  -Consult with wound care team about possible culture   -pain control improved with Robaxin  -Continue guardianship process    Consultants/Specialty  -LPS  -Psychiatry  -Infectious Disease    Code Status  Full Code    Disposition  guardianship granted 1/29/21. SW/CM may attempt to start working on placement.  Obtaining financials.    Review of Systems  Review of Systems   Constitutional: Negative for chills, fever and malaise/fatigue.   HENT: Negative for congestion and sore throat.    Respiratory: Negative for cough and shortness of breath.    Cardiovascular: Negative for chest pain.   Gastrointestinal: Negative for abdominal pain, diarrhea, nausea and vomiting.   Genitourinary: Negative for dysuria, frequency and urgency.   Musculoskeletal: Positive for joint pain and myalgias.   Skin: Negative for itching and rash.   Neurological: Negative for dizziness and headaches.        Physical Exam  Temp:  [36.6 °C (97.9 °F)-36.8 °C (98.2 °F)] 36.6 °C (97.9 °F)  Pulse:  [74-77] 77  Resp:  [16-17] 16  BP: (125-127)/(67-81) 125/67  SpO2:  [93 %-97 %] 93 %    Physical Exam  Vitals and nursing note reviewed.   HENT:      Head: Normocephalic and atraumatic.      Nose: Nose normal.   Eyes:      General:         Right eye: No discharge.         Left eye: No discharge.      Conjunctiva/sclera: Conjunctivae normal.      Pupils: Pupils are equal, round, and reactive to light.   Cardiovascular:      Rate and Rhythm: Normal rate.   Pulmonary:      Effort: Pulmonary effort is normal. No respiratory distress.   Abdominal:      General: Abdomen is flat. There is no distension.      Tenderness: There is no abdominal tenderness. There is no guarding.   Musculoskeletal:      Cervical back: Neck supple.      Right lower leg: No edema.      Left lower leg: No edema.      Comments: LLE with decreased ROM " "and tenderness to palpation.negative pressure wound VAC in place and functioning appropriately          Skin:     General: Skin is warm and dry.   Neurological:      General: No focal deficit present.      Mental Status: He is alert.   Psychiatric:         Attention and Perception: Attention normal.         Mood and Affect: Mood normal.         Speech: Speech normal.         Behavior: Behavior is cooperative.         Cognition and Memory: Cognition is impaired. Memory is impaired.     All systems reviewed and exam is unchanged from prior exam.    Fluids  No intake or output data in the 24 hours ending 03/18/21 1745    Laboratory  Recent Labs     03/18/21  1109   WBC 5.9   RBC 4.19*   HEMOGLOBIN 11.2*   HEMATOCRIT 34.2*   MCV 81.6   MCH 26.7*   MCHC 32.7*   RDW 45.1   PLATELETCT 383   MPV 8.4*                       Imaging  None recent    Assessment/Plan  * Wound of left leg- (present on admission)  Assessment & Plan  -Wound vac - he intermittently dislodges. Requires ongoing education  -Further management per wound care/LPS/ortho.   -Pain control. Long acting pain medications effective. continue as needed at lower dose.  -Improving slowly overall, however most recent wound care note states it is \"failing to contract\" and \"odor\"  -obtain repeat wound culture if wound care team amenable  -CBC with differential pending    Encephalopathy- (present on admission)  Assessment & Plan  -Improved   -Per psychiatry and Dr. Velázquez on 1/11: Patient is incapacitated.  -Public Guardian: Coretta Benjamin   -Attempting placement; possibly [facility]    Psychiatric disorder- (present on admission)  Assessment & Plan  -Unspecified.  -Continue Risperdal and Depakote.  Stable on current treatment.  -Psychiatry has consulted and deemed him incapacitated to leave [place] or make medical decisions.  -Initial cognitive evaluation 8/20.   -SLP repeated Cognitive eval 1/2021- continue to recommend 24/7 supervision   -Public Guardian: Coretta [phone]  -Pending ; obtaining financial status    Essential hypertension- (present on admission)  Assessment & Plan  -Well controlled on amlodipine    Emphysema/COPD (HCC)- (present on admission)  Assessment & Plan  -Chronic and stable off medication, without acute exacerbation    Protein malnutrition (HCC)- (present on admission)  Assessment & Plan  -Body mass index is 21.35 kg/m².   -Continue TID supplements.  -Encourage PO intake  Improving    Flexion contracture of knee, 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)  Assessment & Plan  -Secondary to chronic wound in that area.  -PT/OT.  -Does not seem to limit his mobility. Is frequently ambulatory.  -Continue PRN flexeril . Robaxin added 3/14     Infection of wound due to methicillin resistant Staphylococcus aureus (MRSA)- (present on admission)  Assessment & Plan  -History of MRSA.  -Poor compliance with OP wound care. Poor compliance with wound vac at times.   -Continue pain control as needed.  -LPS and wound care following - debridements and wound VAC changes per their recommendations  -Cultures repeated 12/14 - positive for Proteus species, pan sensitive.  Completed regimen of augmentin.   -3/18 wound care team reports odor, obtain repeat wound culture if indicated.  CBC with differential pending       VTE prophylaxis: Ambulatory       FELIPE Hull have performed a physical exam and reviewed and updated ROS and Plan today (3/18/2021). In review of previous note,  there are no changes except as documented above.

## 2021-03-19 NOTE — PROGRESS NOTES
Received bedside shift report from night shift RN. Assumed patient care. Patient is A+Ox3, disoriented to time. Patient is very sleepy at this time and is resting comfortably. No apparent needs at this time. Bed locked in the lowest position, call light within reach, patient does not call. Hourly rounding in place.

## 2021-03-19 NOTE — CARE PLAN
Problem: Safety  Goal: Will remain free from falls  Description: Pt mobilizes frequently independently.   Outcome: PROGRESSING AS EXPECTED   Patient's gait is steady and environment cleared of trip hazards as much as possible. Patient educated to take wound vac with him when walking.   Problem: Infection  Goal: Will remain free from infection  Outcome: PROGRESSING AS EXPECTED   Standard precautions in place. Hand hygiene performed before and after patient interaction.   Problem: Discharge Barriers/Planning  Goal: Patient's continuum of care needs will be met  Outcome: PROGRESSING AS EXPECTED   Patient is currently waiting for group home placement.

## 2021-03-20 PROCEDURE — 700102 HCHG RX REV CODE 250 W/ 637 OVERRIDE(OP): Performed by: NURSE PRACTITIONER

## 2021-03-20 PROCEDURE — A9270 NON-COVERED ITEM OR SERVICE: HCPCS | Performed by: NURSE PRACTITIONER

## 2021-03-20 PROCEDURE — 700101 HCHG RX REV CODE 250: Performed by: NURSE PRACTITIONER

## 2021-03-20 PROCEDURE — 700111 HCHG RX REV CODE 636 W/ 250 OVERRIDE (IP): Performed by: NURSE PRACTITIONER

## 2021-03-20 PROCEDURE — 770001 HCHG ROOM/CARE - MED/SURG/GYN PRIV*

## 2021-03-20 RX ADMIN — METHOCARBAMOL TABLETS 500 MG: 500 TABLET, COATED ORAL at 17:41

## 2021-03-20 RX ADMIN — DIVALPROEX SODIUM 125 MG: 125 CAPSULE ORAL at 21:25

## 2021-03-20 RX ADMIN — METHOCARBAMOL TABLETS 500 MG: 500 TABLET, COATED ORAL at 13:20

## 2021-03-20 RX ADMIN — RISPERIDONE 0.5 MG: 0.5 TABLET ORAL at 05:35

## 2021-03-20 RX ADMIN — OXYCODONE 5 MG: 5 TABLET ORAL at 21:25

## 2021-03-20 RX ADMIN — METHOCARBAMOL TABLETS 500 MG: 500 TABLET, COATED ORAL at 05:34

## 2021-03-20 RX ADMIN — DIVALPROEX SODIUM 125 MG: 125 CAPSULE ORAL at 05:35

## 2021-03-20 RX ADMIN — AMLODIPINE BESYLATE 5 MG: 5 TABLET ORAL at 05:34

## 2021-03-20 RX ADMIN — MORPHINE SULFATE 15 MG: 15 TABLET, FILM COATED, EXTENDED RELEASE ORAL at 05:35

## 2021-03-20 RX ADMIN — GABAPENTIN 300 MG: 300 CAPSULE ORAL at 13:20

## 2021-03-20 RX ADMIN — DIVALPROEX SODIUM 125 MG: 125 CAPSULE ORAL at 13:20

## 2021-03-20 RX ADMIN — GABAPENTIN 300 MG: 300 CAPSULE ORAL at 17:40

## 2021-03-20 RX ADMIN — MORPHINE SULFATE 15 MG: 15 TABLET, FILM COATED, EXTENDED RELEASE ORAL at 17:41

## 2021-03-20 RX ADMIN — HYDROXYZINE HYDROCHLORIDE 25 MG: 50 TABLET, FILM COATED ORAL at 21:25

## 2021-03-20 RX ADMIN — ENOXAPARIN SODIUM 40 MG: 40 INJECTION SUBCUTANEOUS at 05:34

## 2021-03-20 RX ADMIN — RISPERIDONE 1 MG: 1 TABLET ORAL at 17:41

## 2021-03-20 RX ADMIN — LIDOCAINE 1 PATCH: 50 PATCH CUTANEOUS at 13:19

## 2021-03-20 RX ADMIN — GABAPENTIN 300 MG: 300 CAPSULE ORAL at 05:34

## 2021-03-20 RX ADMIN — METHOCARBAMOL TABLETS 500 MG: 500 TABLET, COATED ORAL at 21:25

## 2021-03-20 ASSESSMENT — PAIN DESCRIPTION - PAIN TYPE
TYPE: CHRONIC PAIN

## 2021-03-20 NOTE — CARE PLAN
Problem: Safety  Goal: Will remain free from injury  Outcome: PROGRESSING AS EXPECTED     Problem: Infection  Goal: Will remain free from infection  Outcome: PROGRESSING AS EXPECTED     Problem: Pain Management  Goal: Pain level will decrease to patient's comfort goal  Outcome: PROGRESSING AS EXPECTED     Problem: Psychosocial Needs:  Goal: Level of anxiety will decrease  Outcome: PROGRESSING AS EXPECTED     Problem: Mobility  Goal: Risk for activity intolerance will decrease  Outcome: PROGRESSING AS EXPECTED       VSS. Pt's pain managed per mar. Pending placement. Wound vac to LLE intact

## 2021-03-20 NOTE — WOUND TEAM
[facility]  Inpatient Services  Wound and Skin Care Progress Note    Admission Date: 8/7/2020     Last order of IP CONSULT TO WOUND CARE was found on 9/1/2020 from Hospital Encounter on 8/7/2020     HPI, PMH, SH: Reviewed    Unit where seen by Wound Team: [id number]    WOUND CONSULT/FOLLOW UP RELATED TO:  Left leg scheduled negative pressure wound therapy (npwt) dressing change and 2 layer compression wrap change    OBJECTIVE: NPWT dressing and 2 layer compression wrap in place. Patient on pressure redistribution mattress, up self, ambulates frequently, turns self.    WOUND TYPE, LOCATION, CHARACTERISTICS (Pressure Injuries: location, stage, POA or date identified)        Skin Risk/[place]   Sensory Perception: No Impairment  Moisture: Rarely Moist  Activity: Walks Frequently  Mobility: No Limitations  Nutrition: Adequate  Friction and Shear: No Apparent Problem  Total Score: 22  Skin Breakdown Risk: 18-23 Minimal Risk for Skin Breakdown    Sensory Interventions  Bed Types: Pressure Redistribution Mattress (Atmosair)  Skin Preventative Measures: Pillows in Use for Support / Positioning  Skin Preventative Dressing: Mepilex  Moisturizers/Barriers: Moisturizer   PT / OT Involved in Care: Physical Therapy Involved, Occupational Therapy Involved  Activity : Ambulated  Patient Turns / Repositioning: Patient Turns Self from Side to Side  Patient is Receiving Nutrition: Oral Intake Adequate  Nutrition Consult Ordered: No, Consult has Already been Placed  Vitamin Therapy in Use: No  Friction Interventions: Draw Sheet / Pad Used for Repositioning                Negative Pressure Wound Therapy 11/20/20 Leg Lateral;Posterior;Medial Left (Active)   Vacuum Serial Number [id number] 03/01/21 1000   NPWT Pump Mode / Pressure Setting Intermittent;150 mmHg 03/19/21 1700   Dressing Type Medium;Black Foam (Regular) 03/19/21 1700   Number of Foam Pieces Used 3 03/17/21 1500   Canister Changed No 03/19/21 1700   Output (mL) 200 mL  03/16/21 0800   NEXT Dressing Change/Treatment Date 03/19/21 03/17/21 1500   WOUND NURSE ONLY - Time Spent with Patient (mins) 90 03/17/21 1500           Wound 11/19/20 Full Thickness Wound Leg Lateral;Posterior;Medial Left (Active)   Wound Image    03/17/21 1500   Site Assessment Red;Yellow;Bleeding 03/19/21 1700   Periwound Assessment Scar tissue 03/19/21 1700   Margins Attached edges 03/19/21 1700   Closure Secondary intention 03/17/21 1500   Drainage Amount Moderate 03/19/21 1700   Drainage Description Serosanguineous 03/19/21 1700   Treatments Cleansed;Site care;CSWD - Conservative Sharp Wound Debridement 03/17/21 1500   Wound Cleansing Approved Wound Cleanser 03/17/21 1500   Periwound Protectant Skin Protectant Wipes to Periwound;Hydrocolloid 03/17/21 1500   Dressing Cleansing/Solutions Not Applicable 03/17/21 1500   Dressing Options Wound Vac 03/19/21 1700   Dressing Changed Changed 03/19/21 1700   Dressing Status Clean;Dry;Intact 03/19/21 0839   Dressing Change/Treatment Frequency Monday, Wednesday, Friday, and As Needed 03/19/21 1700   NEXT Dressing Change/Treatment Date 03/22/21 03/19/21 1700   NEXT Weekly Photo (Inpatient Only) 03/22/21 03/19/21 1700   Non-staged Wound Description Full thickness 03/19/21 1700   Wound Length (cm) 12.6 cm 02/22/21 0830   Wound Width (cm) 3 cm 02/22/21 0830   Wound Depth (cm) 0.3 cm 03/17/21 1500   Wound Surface Area (cm^2) 37.8 cm^2 02/22/21 0830   Wound Volume (cm^3) 11.34 cm^3 02/22/21 0830   Post-Procedure Length (cm) 15 cm 02/03/21 1100   Post-Procedure Width (cm) 3.4 cm 02/03/21 1100   Post-Procedure Depth (cm) 0.2 cm 02/03/21 1100   Post-Procedure Surface Area (cm^2) 51 cm^2 02/03/21 1100   Post-Procedure Volume (cm^3) 10.2 cm^3 02/03/21 1100   Wound Healing % -11 02/22/21 0830   Wound Bed Granulation (%) 90 % 03/17/21 1500   Wound Bed Slough (%) 10 % 03/17/21 1500   Wound Bed Granulation (%) - Post-Procedure 100 % 03/17/21 1500   Tunneling (cm) 0 cm 02/10/21 1400    Undermining (cm) 0 cm 02/10/21 1400   Shape rectangular 03/19/21 1700   Wound Odor Foul 03/19/21 1700   Pulses Left;2+;DP;PT 03/15/21 1600   Exposed Structures None 03/17/21 1500   WOUND NURSE ONLY - Time Spent with Patient (mins) 90 03/19/21 1700              Lab Values: culture taken per Nya ELLER 3/19/21    Lab Results   Component Value Date/Time    WBC 5.9 03/18/2021 11:09 AM    RBC 4.19 (L) 03/18/2021 11:09 AM    HEMOGLOBIN 11.2 (L) 03/18/2021 11:09 AM    HEMATOCRIT 34.2 (L) 03/18/2021 11:09 AM    CREACTPROT 1.07 (H) 08/12/2020 01:55 PM    SEDRATEWES 85 (H) 08/07/2020 01:20 PM    HBA1C 5.7 (H) 11/09/2018 06:30 PM      Culture Results show:  Recent Results (from the past 720 hour(s))   CULTURE WOUND W/ GRAM STAIN    Collection Time: 09/01/20  2:00 PM    Specimen: Left Leg; Wound   Result Value Ref Range    Significant Indicator POS (POS)     Source WND     Site LEFT LEG     Culture Result - (A)     Gram Stain Result       Moderate Gram positive cocci.  Moderate Gram positive rods.      Culture Result (A)      Beta Hemolytic Streptococcus group A  Moderate growth      Culture Result (A)      Methicillin Resistant Staphylococcus aureus  Moderate growth      Culture Result (A)      Diphtheroids  Moderate growth  Mixed morphologies.     KOSTAS: 9/20/21   Left.    Doppler waveforms of the common femoral artery are of high amplitude and    triphasic.    Doppler waveforms at the ankle are brisk and triphasic.    Ankle-brachial index is normal.    Unable to obtained popliteal waveforms due to wound bandages.     Self Report / Pain Level:  Pt reported pain with dressing removal and debridement but denied pain for the rest of the procedure.     INTERVENTIONS BY WOUND TEAM: INTERVENTIONS BY WOUND TEAM:  Performed standard wound care which includes appropriate positioning, dressing removal and non-selective debridement. Pictures and measurements obtained weekly if/when required.  Cleansed: Wound cleanser and gauze  used to clean wound beds and periwound  Debridement: sharp debridement of yellow Slough using Curette <20 cm2    Periwound - Skin protectant swabs and hydrocolloid used around wound perimeter and to form bridge  Primary - Did not use Vanessa to wound beds, 2 piece of black foam half thickness cut to fit and placed to each wound bed and 1 black piece used to bridge- all secured with drape, restarted NPWT 150mmHg, Intermittent 3/3 no leaks noted.   Secondary - 2 layer compression wrap foot/leg/thigh     Interdisciplinary consultation: Patient, Bedside RN (Makeda), Wound care RN Sera    EVALUATION / RATIONALE FOR TREATMENT:  3/13/21 Wounds failing to contract.  Will trial intermittent wound vac setting to encourage greater stimulation of cellular activity in wound bed.    3/10/2021: debrided wound, odor present, continue NPWT  3/6/12 area unchanged, odor persists  3/1/21: Lidocaine unavailable during this dressing change. Small amount of debridement completed with curette. Continued with silver powder for its antimicrobial properties.   2/27/21 skin bridge enlarging  2/24/2021: Excisional debridement by LPS APRN performed for rolled edges that were starting to form along the posterior and medial left leg wound. Moderate bleeding managed by administration of lidocaine injection. CSWD performed for slough. Wound bed is beefy red following debridement. Resume vanessa and silver foam to manage bioburden. Continue with CSWD with each dressing change.   2/22/2021: Rolled edges starting to form along posterior thigh wound bed - may require excisional debridement by provider. Minimal changes to actual wound bed. Continue CSWD, vanessa, and silver foam to manage bioburden.   2/19/21: minimal to no changes from last assessment. Continue CSWD, vanessa, and silver foam for bioburden.  2/17/2021: Periwound remains intact. Adherent slough noted over wound bed. Scant bleeding noted on medial wound following CSWD. Continue with Vanessa  over wound beds to further stimulate epithelization. Continue with silver foam to manage bioburden.   2/12/2021:periwound intact does not appear macerated.  Moderate amount of drainage. Wound bed debrided.   Picture frame draped over periwound, three silver foams used.  Continue NPWT and two layer compression.    2/10/21: Periwound looks less denuded today. Did not place the arglaes powder to wound bed - switching to Vanessa to see if it will stimulate healing in his wound bed. Did not use hydrofiber to periwound but it is in the room for next visit in case it is needed. Ordered and used liquid lidocaine to remove vac dressing and lidocaine gel to the posterior proximal part of wound. Patient tolerated debridement well with lidocaine.   02/05/21: Addison wound is denuded and wet, vac drape lifting under wrap, Aquacel to dry wet and denuded skin, no silver foam available this dressing change.  02/03/21: Measurements are smaller for both medial and posterior thigh.  Wound bed has granulation and non-viable slough.  Will continue to debride every time with wound VAC changes to decrease bioburden in wound bed.   02/01/21:  No change.  Wounds appear to be improving slowly.      01/29/2021: wounds flush with periwound, fully granulated with scant yellow to a few areas. Medial periwound intact, lateral periwound with some erythema. LPS APRN suggested that next time applying brava strips to any irritated periwound may help reduce irritation.   1/27/21: Restarted NPWT, addison-wound looks better.   1/25/21: denuded tissue, vac vacation for until 1/27.  Re-assessment to place wound VAC to left LE.   1/21/21 area has decreased since last measurements.  Odor still present.  Addison skin compromised probably yeast infection under VAC drape.  Will hold VAC for 72 hours and re assess.  Nystatin to address yeast and dry out addison skin  1/15/21: position of leg may affect posterior thigh measurements.  Medial Leg wound area decreased.     01/10/2021: Wound vac replaced, patient accidentally removed vac and bedside RN was unable to repair.  Patient tolerated wound VAC placement. Patient had moderate ss with some green tinge drainage.   01/04/2021: 2 layer compression wrap re-applied to left LE due to ACE wrap leaving too much indentation to left LE.   12/31/2020 thigh wound much narrower.  Biofilm redeveloping and odor still present.   12/27/20: Wound bed granular and odor has lessen but still persists.   12/18/20 wound length decreased.  Odor persists.    12/14/2020: re-cultured wound bed.  12/11/2020 POD 23 Length of wounds has decreased, width has not changed.  Odor persists.  Pt continues to become angry with VAC when it limits his mobility.    12/7/2020 POD 19 odor persists, improved wound bed after debridement.  Possible bacterial vs yeast vs fungal infection.  Culture taken.  Nystatin to treat possible fungal infection, silver foam to decrease microbial population.    12/4/2020 POD# 16 odor worsening, more yellow tissue present, will add nystatin and silver foam next week and consider a culture  11/30 POD#11 granular buds visible to wound bed.  Same as prior.   11/27 POD#8 wound is odorous likely related to biologic dressing.  No overt signs of infection.  Granular buds are visible through adaptic.  Edges do not appear as thick as they were prior to last sx.  Continue with current treatment.    11/23: POD#4 s/p I&D of left LE wounds and biologic placed by Dr. Olvera on 11/19.  Wound bed is flatter and raised edges scar tissue seems to be gone.   11/14 Posterior thigh aspect of wound deteriorating, will increase frequency of treatment to keep biofilm down and hopefully avoid systemic abx.  Will include thigh in compression wrap.  Will consider culture if improvement is not seen in the next few treatments.    11/10: APRN unavailable at this time.  Will re-schedule excisional debridement to next week.   11/5: Plan for debridement of scarred  edges on Tues by LPS. Tendon exposed to posterior aspect of wound, behind knee. Continue with current dressing care.  10/29: Excisional debridement by Asaf ELLER of scar tissue along the proximal edge of the posterior knee and lateral thigh.  Lateral aspect of thigh is flatten.  Medial aspect of knee has thick scar tissue that was excisionally debrided by Asaf ELLER.  Fully granulated throughout the wound bed.   10/23 wound bed mostly granular, superficial in depth, progress has been slow with the wound VAC so will trial collagen product to encourage new tissue growth.    10/19: wound bed has improved in color and is fully granulated.  Addison-wound has improved, denudation has resolved. APRN continuing with serial weekly debridements as needed.   10/16: wound friable pt now on iv abx per ID.  Indurated edges addressed with drape and foam.  Addison wound likely has yeast infection - addressing with silver powder crusting  10/14: patient seen with Asaf ELLER, stopping veraflo instillation.  Starting silver powder and regular wound VAC to see if it will help with bioburden.  Possible colonization of bacteria.  ID involved.   10/12: Inflammation noted to the proximal part of the wound bed.  Will continue to monitor, possible vac break on Wednesday to help addison-skin inflammation.   10/7: Excisional debridement performed by Asaf ELLER. Will need to continue selective debridement with NPWT changes, and weekly excisional debridement with APRN to flatten out the scar tissue.  IV abx therapy ended 10/5.   10/5: Lateral wound still with some slough, both wounds smaller per measurements. Medial wound with all granular tissue.  9/30: Patient to start abx therapy for 7 days course per Dr. Ellis.  Started dakin's instillation to veraflo  9/28: malodorous today, culture taken.    9/25: Odor noted, though unclear if from wound or pt, consider shower before next Vac change. Consider regular vac next  change, wound granular and superficial.   9/23: Patient still awaiting guardianship for placement.   9/18: wound granulating nicely and responding well to current treatment.    9/16: Wound bed with granular tissue, edges are thick but appear better than previous assessments. . NPWT VF to encourage closure by secondary intention and manage drainage while encouraging oxygenation and granulation to the wound bed. 2 layer compression to assist in decrease of swelling and encourage blood flow to wounds. Wound team to follow up and change 3x/week.      Goals: Steady decrease in wound area and depth weekly.    NURSING PLAN OF CARE ORDERS (X):    Dressing changes: See Dressing Care orders: X  Skin care: See Skin Care orders:   Rectal tube care: See Rectal Tube Care orders:   Other orders:      WOUND TEAM PLAN OF CARE:   Dressing changes by wound team:        Follow up 3 times weekly:                NPWT change 3 times weekly:  X,  NPWT changes 3x/ weekly with 2 layer compression wrap.  Follow up 1-2 times weekly:      Follow up Bi-Monthly:                   Follow up as needed:       Other (explain):     Anticipated discharge plans:  LTACH:        SNF/Rehab: X - patient will need ongoing wound care for LLE upon discharge - 3x/weekly           Home Health Care:           Outpatient Wound Center:            Self Care:

## 2021-03-21 PROCEDURE — A9270 NON-COVERED ITEM OR SERVICE: HCPCS | Performed by: NURSE PRACTITIONER

## 2021-03-21 PROCEDURE — 700102 HCHG RX REV CODE 250 W/ 637 OVERRIDE(OP): Performed by: NURSE PRACTITIONER

## 2021-03-21 PROCEDURE — 770001 HCHG ROOM/CARE - MED/SURG/GYN PRIV*

## 2021-03-21 PROCEDURE — 99231 SBSQ HOSP IP/OBS SF/LOW 25: CPT | Performed by: NURSE PRACTITIONER

## 2021-03-21 PROCEDURE — 700101 HCHG RX REV CODE 250: Performed by: NURSE PRACTITIONER

## 2021-03-21 PROCEDURE — 700111 HCHG RX REV CODE 636 W/ 250 OVERRIDE (IP): Performed by: NURSE PRACTITIONER

## 2021-03-21 RX ADMIN — GABAPENTIN 300 MG: 300 CAPSULE ORAL at 05:22

## 2021-03-21 RX ADMIN — LIDOCAINE 1 PATCH: 50 PATCH CUTANEOUS at 11:40

## 2021-03-21 RX ADMIN — RISPERIDONE 1 MG: 1 TABLET ORAL at 16:32

## 2021-03-21 RX ADMIN — DIVALPROEX SODIUM 125 MG: 125 CAPSULE ORAL at 20:38

## 2021-03-21 RX ADMIN — METHOCARBAMOL TABLETS 500 MG: 500 TABLET, COATED ORAL at 05:23

## 2021-03-21 RX ADMIN — OXYCODONE 5 MG: 5 TABLET ORAL at 11:39

## 2021-03-21 RX ADMIN — OXYCODONE 5 MG: 5 TABLET ORAL at 03:31

## 2021-03-21 RX ADMIN — OXYCODONE 5 MG: 5 TABLET ORAL at 20:37

## 2021-03-21 RX ADMIN — METHOCARBAMOL TABLETS 500 MG: 500 TABLET, COATED ORAL at 16:32

## 2021-03-21 RX ADMIN — DIVALPROEX SODIUM 125 MG: 125 CAPSULE ORAL at 16:31

## 2021-03-21 RX ADMIN — AMLODIPINE BESYLATE 5 MG: 5 TABLET ORAL at 05:22

## 2021-03-21 RX ADMIN — DIVALPROEX SODIUM 125 MG: 125 CAPSULE ORAL at 05:22

## 2021-03-21 RX ADMIN — MORPHINE SULFATE 15 MG: 15 TABLET, FILM COATED, EXTENDED RELEASE ORAL at 05:23

## 2021-03-21 RX ADMIN — METHOCARBAMOL TABLETS 500 MG: 500 TABLET, COATED ORAL at 20:37

## 2021-03-21 RX ADMIN — RISPERIDONE 0.5 MG: 0.5 TABLET ORAL at 05:23

## 2021-03-21 RX ADMIN — GABAPENTIN 300 MG: 300 CAPSULE ORAL at 16:32

## 2021-03-21 RX ADMIN — HYDROXYZINE HYDROCHLORIDE 25 MG: 50 TABLET, FILM COATED ORAL at 20:37

## 2021-03-21 RX ADMIN — GABAPENTIN 300 MG: 300 CAPSULE ORAL at 11:39

## 2021-03-21 RX ADMIN — MORPHINE SULFATE 15 MG: 15 TABLET, FILM COATED, EXTENDED RELEASE ORAL at 16:32

## 2021-03-21 RX ADMIN — ENOXAPARIN SODIUM 40 MG: 40 INJECTION SUBCUTANEOUS at 05:22

## 2021-03-21 RX ADMIN — CYCLOBENZAPRINE 10 MG: 10 TABLET, FILM COATED ORAL at 00:45

## 2021-03-21 ASSESSMENT — ENCOUNTER SYMPTOMS
SORE THROAT: 0
HEADACHES: 0
SHORTNESS OF BREATH: 0
DIARRHEA: 0
MYALGIAS: 1
CHILLS: 0
DIZZINESS: 0
NAUSEA: 0
ABDOMINAL PAIN: 0
FEVER: 0
COUGH: 0
VOMITING: 0

## 2021-03-21 ASSESSMENT — PAIN DESCRIPTION - PAIN TYPE
TYPE: CHRONIC PAIN
TYPE: ACUTE PAIN
TYPE: ACUTE PAIN
TYPE: CHRONIC PAIN
TYPE: ACUTE PAIN
TYPE: ACUTE PAIN
TYPE: CHRONIC PAIN
TYPE: CHRONIC PAIN

## 2021-03-21 NOTE — CARE PLAN
Problem: Safety  Goal: Will remain free from injury  Outcome: PROGRESSING AS EXPECTED     Problem: Infection  Goal: Will remain free from infection  Outcome: PROGRESSING AS EXPECTED     Problem: Pain Management  Goal: Pain level will decrease to patient's comfort goal  Outcome: PROGRESSING AS EXPECTED     Problem: Psychosocial Needs:  Goal: Level of anxiety will decrease  Outcome: PROGRESSING AS EXPECTED       VSS. Pain managed per mar. Pending placement.

## 2021-03-21 NOTE — CARE PLAN
"  Problem: Safety  Goal: Will remain free from injury  Outcome: PROGRESSING AS EXPECTED  Patient ambulates ad erika in his room and hallways with steady gait.  He calls appropriately for assistance when needed.     Problem: Venous Thromboembolism (VTW)/Deep Vein Thrombosis (DVT) Prevention:  Goal: Patient will participate in Venous Thrombosis (VTE)/Deep Vein Thrombosis (DVT)Prevention Measures  Outcome: PROGRESSING AS EXPECTED  Patient ambulates ad erika frequently in halls and room.       Problem: Pain Management  Goal: Pain level will decrease to patient's comfort goal  Outcome: PROGRESSING AS EXPECTED  Patient complains of pain in LLE, medication given per MAR.     Problem: Knowledge Deficit  Goal: Knowledge of disease process/condition, treatment plan, diagnostic tests, and medications will improve  Outcome: PROGRESSING SLOWER THAN EXPECTED  Confused and forgetful regarding POC and wound vac.  States \"I don't know why I have this on my leg.\"  "

## 2021-03-22 PROCEDURE — 700102 HCHG RX REV CODE 250 W/ 637 OVERRIDE(OP): Performed by: NURSE PRACTITIONER

## 2021-03-22 PROCEDURE — A9270 NON-COVERED ITEM OR SERVICE: HCPCS | Performed by: NURSE PRACTITIONER

## 2021-03-22 PROCEDURE — 770001 HCHG ROOM/CARE - MED/SURG/GYN PRIV*

## 2021-03-22 PROCEDURE — 97606 NEG PRS WND THER DME>50 SQCM: CPT

## 2021-03-22 PROCEDURE — 700111 HCHG RX REV CODE 636 W/ 250 OVERRIDE (IP): Performed by: NURSE PRACTITIONER

## 2021-03-22 RX ADMIN — GABAPENTIN 300 MG: 300 CAPSULE ORAL at 05:19

## 2021-03-22 RX ADMIN — DIVALPROEX SODIUM 125 MG: 125 CAPSULE ORAL at 23:11

## 2021-03-22 RX ADMIN — MORPHINE SULFATE 15 MG: 15 TABLET, FILM COATED, EXTENDED RELEASE ORAL at 18:02

## 2021-03-22 RX ADMIN — CYCLOBENZAPRINE 10 MG: 10 TABLET, FILM COATED ORAL at 10:30

## 2021-03-22 RX ADMIN — RISPERIDONE 1 MG: 1 TABLET ORAL at 18:01

## 2021-03-22 RX ADMIN — HYDROXYZINE HYDROCHLORIDE 25 MG: 50 TABLET, FILM COATED ORAL at 10:30

## 2021-03-22 RX ADMIN — METHOCARBAMOL TABLETS 500 MG: 500 TABLET, COATED ORAL at 05:20

## 2021-03-22 RX ADMIN — METHOCARBAMOL TABLETS 500 MG: 500 TABLET, COATED ORAL at 13:42

## 2021-03-22 RX ADMIN — ENOXAPARIN SODIUM 40 MG: 40 INJECTION SUBCUTANEOUS at 05:19

## 2021-03-22 RX ADMIN — DIVALPROEX SODIUM 125 MG: 125 CAPSULE ORAL at 13:42

## 2021-03-22 RX ADMIN — AMLODIPINE BESYLATE 5 MG: 5 TABLET ORAL at 05:19

## 2021-03-22 RX ADMIN — RISPERIDONE 0.5 MG: 0.5 TABLET ORAL at 05:20

## 2021-03-22 RX ADMIN — DIVALPROEX SODIUM 125 MG: 125 CAPSULE ORAL at 05:19

## 2021-03-22 RX ADMIN — METHOCARBAMOL TABLETS 500 MG: 500 TABLET, COATED ORAL at 18:02

## 2021-03-22 RX ADMIN — MORPHINE SULFATE 15 MG: 15 TABLET, FILM COATED, EXTENDED RELEASE ORAL at 05:19

## 2021-03-22 RX ADMIN — GABAPENTIN 300 MG: 300 CAPSULE ORAL at 13:42

## 2021-03-22 RX ADMIN — GABAPENTIN 300 MG: 300 CAPSULE ORAL at 18:02

## 2021-03-22 RX ADMIN — METHOCARBAMOL TABLETS 500 MG: 500 TABLET, COATED ORAL at 23:11

## 2021-03-22 ASSESSMENT — PAIN DESCRIPTION - PAIN TYPE
TYPE: CHRONIC PAIN
TYPE: ACUTE PAIN
TYPE: CHRONIC PAIN
TYPE: ACUTE PAIN
TYPE: ACUTE PAIN

## 2021-03-22 NOTE — WOUND TEAM
[facility]  Inpatient Services  Wound and Skin Care Progress Note    Admission Date: 8/7/2020     Last order of IP CONSULT TO WOUND CARE was found on 9/1/2020 from Hospital Encounter on 8/7/2020     HPI, PMH, SH: Reviewed    Unit where seen by Wound Team: [id number]    WOUND CONSULT/FOLLOW UP RELATED TO:  Left leg scheduled negative pressure wound therapy (npwt) dressing change and 2 layer compression wrap change    OBJECTIVE: NPWT dressing and 2 layer compression wrap in place. Patient on pressure redistribution mattress, up self, ambulates frequently, turns self.    WOUND TYPE, LOCATION, CHARACTERISTICS (Pressure Injuries: location, stage, POA or date identified)        Negative Pressure Wound Therapy 11/20/20 Leg Lateral;Posterior;Medial Left (Active)   Vacuum Serial Number [id number] 03/01/21 1000   NPWT Pump Mode / Pressure Setting Intermittent;150 mmHg 03/22/21 1400   Dressing Type Medium;Black Foam (Regular) 03/22/21 1400   Number of Foam Pieces Used 3 03/22/21 1400   Canister Changed No 03/22/21 1400   Output (mL) 200 mL 03/16/21 0800   NEXT Dressing Change/Treatment Date 03/24/21 03/22/21 1400   WOUND NURSE ONLY - Time Spent with Patient (mins) 90 03/17/21 1500           Wound 11/19/20 Full Thickness Wound Leg Lateral;Posterior;Medial Left (Active)   Wound Image   03/22/21 0400   Site Assessment Red;Yellow;Bleeding 03/22/21 1400   Periwound Assessment Scar tissue 03/22/21 1400   Margins Attached edges 03/22/21 1400   Closure Secondary intention 03/22/21 1400   Drainage Amount Small 03/22/21 1400   Drainage Description Sanguineous 03/22/21 1400   Treatments Cleansed;Site care 03/22/21 1400   Wound Cleansing Approved Wound Cleanser 03/22/21 1400   Periwound Protectant Skin Protectant Wipes to Periwound;Hydrocolloid 03/22/21 1400   Dressing Cleansing/Solutions Not Applicable 03/22/21 1400   Dressing Options Collagen Dressing;Wound Vac;Compression Wrap Two Layer 03/22/21 1400   Dressing Changed Changed  03/22/21 1400   Dressing Status Clean;Dry;Intact 03/22/21 1400   Dressing Change/Treatment Frequency Monday, Wednesday, Friday, and As Needed 03/22/21 1400   NEXT Dressing Change/Treatment Date 03/24/21 03/22/21 1400   NEXT Weekly Photo (Inpatient Only) 03/24/21 03/22/21 1400   Non-staged Wound Description Full thickness 03/22/21 1400   Wound Length (cm) 12.6 cm 02/22/21 0830   Wound Width (cm) 3 cm 02/22/21 0830   Wound Depth (cm) 0.3 cm 03/17/21 1500   Wound Surface Area (cm^2) 37.8 cm^2 02/22/21 0830   Wound Volume (cm^3) 11.34 cm^3 02/22/21 0830   Post-Procedure Length (cm) 15 cm 02/03/21 1100   Post-Procedure Width (cm) 3.4 cm 02/03/21 1100   Post-Procedure Depth (cm) 0.2 cm 02/03/21 1100   Post-Procedure Surface Area (cm^2) 51 cm^2 02/03/21 1100   Post-Procedure Volume (cm^3) 10.2 cm^3 02/03/21 1100   Wound Healing % -11 02/22/21 0830   Wound Bed Granulation (%) 100 % 03/22/21 1400   Wound Bed Slough (%) 10 % 03/17/21 1500   Wound Bed Granulation (%) - Post-Procedure 100 % 03/17/21 1500   Tunneling (cm) 0 cm 02/10/21 1400   Undermining (cm) 0 cm 02/10/21 1400   Shape two rectangules  03/22/21 1400   Wound Odor Strong 03/22/21 1400   Pulses Left;2+;DP;PT 03/15/21 1600   Exposed Structures None 03/22/21 1400   WOUND NURSE ONLY - Time Spent with Patient (mins) 60 03/22/21 1400       Lab Values:    Lab Results   Component Value Date/Time    WBC 5.9 03/18/2021 11:09 AM    RBC 4.19 (L) 03/18/2021 11:09 AM    HEMOGLOBIN 11.2 (L) 03/18/2021 11:09 AM    HEMATOCRIT 34.2 (L) 03/18/2021 11:09 AM    CREACTPROT 1.07 (H) 08/12/2020 01:55 PM    SEDRATEWES 85 (H) 08/07/2020 01:20 PM    HBA1C 5.7 (H) 11/09/2018 06:30 PM      Culture Results show:  Recent Results (from the past 720 hour(s))   CULTURE WOUND W/ GRAM STAIN    Collection Time: 09/01/20  2:00 PM    Specimen: Left Leg; Wound   Result Value Ref Range    Significant Indicator POS (POS)     Source WND     Site LEFT LEG     Culture Result - (A)     Gram Stain Result        Moderate Gram positive cocci.  Moderate Gram positive rods.      Culture Result (A)      Beta Hemolytic Streptococcus group A  Moderate growth      Culture Result (A)      Methicillin Resistant Staphylococcus aureus  Moderate growth      Culture Result (A)      Diphtheroids  Moderate growth  Mixed morphologies.     KOSTAS: 9/20/21   Left.    Doppler waveforms of the common femoral artery are of high amplitude and    triphasic.    Doppler waveforms at the ankle are brisk and triphasic.    Ankle-brachial index is normal.    Unable to obtained popliteal waveforms due to wound bandages.     Self Report / Pain Level:  Pt reported pain with dressing removal but denied pain for the rest of the procedure.     INTERVENTIONS BY WOUND TEAM: INTERVENTIONS BY WOUND TEAM:  Performed standard wound care which includes appropriate positioning, dressing removal and non-selective debridement. Pictures and measurements obtained weekly if/when required.  Cleansed: Wound cleanser and gauze used to clean wound beds and periwound  Debridement: NA   Periwound - Skin protectant swabs and hydrocolloid used around wound perimeter and to form bridge  Primary - Vanessa to wound beds, 2 piece of black foam half thickness cut to fit and placed to each wound bed and 1 black piece used to bridge- all secured with drape, restarted NPWT 150mmHg, Intermittent 3/3 no leaks noted.   Secondary - 2 layer compression wrap foot/leg/thigh     Interdisciplinary consultation: Patient, Bedside RN, Wound care RN Sera    EVALUATION / RATIONALE FOR TREATMENT:    3/22/21: Area unchanged from previous dressing change.  Wound malodorous.  Meredith-skin dry and flaky at superior wound border, hydocolloid thin dressing placed.   3/13/21 Wounds failing to contract.  Will trial intermittent wound vac setting to encourage greater stimulation of cellular activity in wound bed.    3/10/2021: debrided wound, odor present, continue NPWT  3/6/12 area unchanged, odor  persists  3/1/21: Lidocaine unavailable during this dressing change. Small amount of debridement completed with curette. Continued with silver powder for its antimicrobial properties.   2/27/21 skin bridge enlarging  2/24/2021: Excisional debridement by LPS APRN performed for rolled edges that were starting to form along the posterior and medial left leg wound. Moderate bleeding managed by administration of lidocaine injection. CSWD performed for slough. Wound bed is beefy red following debridement. Resume agata and silver foam to manage bioburden. Continue with CSWD with each dressing change.   2/22/2021: Rolled edges starting to form along posterior thigh wound bed - may require excisional debridement by provider. Minimal changes to actual wound bed. Continue CSWD, agata, and silver foam to manage bioburden.   2/19/21: minimal to no changes from last assessment. Continue CSWD, agata, and silver foam for bioburden.  2/17/2021: Periwound remains intact. Adherent slough noted over wound bed. Scant bleeding noted on medial wound following CSWD. Continue with Agata over wound beds to further stimulate epithelization. Continue with silver foam to manage bioburden.   2/12/2021:periwound intact does not appear macerated.  Moderate amount of drainage. Wound bed debrided.   Picture frame draped over periwound, three silver foams used.  Continue NPWT and two layer compression.    2/10/21: Periwound looks less denuded today. Did not place the arglaes powder to wound bed - switching to Agata to see if it will stimulate healing in his wound bed. Did not use hydrofiber to periwound but it is in the room for next visit in case it is needed. Ordered and used liquid lidocaine to remove vac dressing and lidocaine gel to the posterior proximal part of wound. Patient tolerated debridement well with lidocaine.   02/05/21: Meredith wound is denuded and wet, vac drape lifting under wrap, Aquacel to dry wet and denuded skin, no silver  foam available this dressing change.  02/03/21: Measurements are smaller for both medial and posterior thigh.  Wound bed has granulation and non-viable slough.  Will continue to debride every time with wound VAC changes to decrease bioburden in wound bed.   02/01/21:  No change.  Wounds appear to be improving slowly.      01/29/2021: wounds flush with periwound, fully granulated with scant yellow to a few areas. Medial periwound intact, lateral periwound with some erythema. LPS APRN suggested that next time applying brava strips to any irritated periwound may help reduce irritation.   1/27/21: Restarted NPWT, addison-wound looks better.   1/25/21: denuded tissue, vac vacation for until 1/27.  Re-assessment to place wound VAC to left LE.   1/21/21 area has decreased since last measurements.  Odor still present.  Addison skin compromised probably yeast infection under VAC drape.  Will hold VAC for 72 hours and re assess.  Nystatin to address yeast and dry out addison skin  1/15/21: position of leg may affect posterior thigh measurements.  Medial Leg wound area decreased.    01/10/2021: Wound vac replaced, patient accidentally removed vac and bedside RN was unable to repair.  Patient tolerated wound VAC placement. Patient had moderate ss with some green tinge drainage.   01/04/2021: 2 layer compression wrap re-applied to left LE due to ACE wrap leaving too much indentation to left LE.   12/31/2020 thigh wound much narrower.  Biofilm redeveloping and odor still present.   12/27/20: Wound bed granular and odor has lessen but still persists.   12/18/20 wound length decreased.  Odor persists.    12/14/2020: re-cultured wound bed.  12/11/2020 POD 23 Length of wounds has decreased, width has not changed.  Odor persists.  Pt continues to become angry with VAC when it limits his mobility.    12/7/2020 POD 19 odor persists, improved wound bed after debridement.  Possible bacterial vs yeast vs fungal infection.  Culture taken.  Nystatin  to treat possible fungal infection, silver foam to decrease microbial population.    12/4/2020 POD# 16 odor worsening, more yellow tissue present, will add nystatin and silver foam next week and consider a culture  11/30 POD#11 granular buds visible to wound bed.  Same as prior.   11/27 POD#8 wound is odorous likely related to biologic dressing.  No overt signs of infection.  Granular buds are visible through adaptic.  Edges do not appear as thick as they were prior to last sx.  Continue with current treatment.    11/23: POD#4 s/p I&D of left LE wounds and biologic placed by Dr. Olvera on 11/19.  Wound bed is flatter and raised edges scar tissue seems to be gone.   11/14 Posterior thigh aspect of wound deteriorating, will increase frequency of treatment to keep biofilm down and hopefully avoid systemic abx.  Will include thigh in compression wrap.  Will consider culture if improvement is not seen in the next few treatments.    11/10: APRN unavailable at this time.  Will re-schedule excisional debridement to next week.   11/5: Plan for debridement of scarred edges on Tues by LPS. Tendon exposed to posterior aspect of wound, behind knee. Continue with current dressing care.  10/29: Excisional debridement by Asaf ELLER of scar tissue along the proximal edge of the posterior knee and lateral thigh.  Lateral aspect of thigh is flatten.  Medial aspect of knee has thick scar tissue that was excisionally debrided by Asaf ELLER.  Fully granulated throughout the wound bed.   10/23 wound bed mostly granular, superficial in depth, progress has been slow with the wound VAC so will trial collagen product to encourage new tissue growth.    10/19: wound bed has improved in color and is fully granulated.  Meredith-wound has improved, denudation has resolved. APRN continuing with serial weekly debridements as needed.   10/16: wound friable pt now on iv abx per ID.  Indurated edges addressed with drape and foam.  Meredith  wound likely has yeast infection - addressing with silver powder crusting  10/14: patient seen with Asaf ELLER, stopping veraflo instillation.  Starting silver powder and regular wound VAC to see if it will help with bioburden.  Possible colonization of bacteria.  ID involved.   10/12: Inflammation noted to the proximal part of the wound bed.  Will continue to monitor, possible vac break on Wednesday to help addison-skin inflammation.   10/7: Excisional debridement performed by Asaf ELLER. Will need to continue selective debridement with NPWT changes, and weekly excisional debridement with APRN to flatten out the scar tissue.  IV abx therapy ended 10/5.   10/5: Lateral wound still with some slough, both wounds smaller per measurements. Medial wound with all granular tissue.  9/30: Patient to start abx therapy for 7 days course per Dr. Ellis.  Started dakin's instillation to veraflo  9/28: malodorous today, culture taken.    9/25: Odor noted, though unclear if from wound or pt, consider shower before next Vac change. Consider regular vac next change, wound granular and superficial.   9/23: Patient still awaiting guardianship for placement.   9/18: wound granulating nicely and responding well to current treatment.    9/16: Wound bed with granular tissue, edges are thick but appear better than previous assessments. . NPWT VF to encourage closure by secondary intention and manage drainage while encouraging oxygenation and granulation to the wound bed. 2 layer compression to assist in decrease of swelling and encourage blood flow to wounds. Wound team to follow up and change 3x/week.      Goals: Steady decrease in wound area and depth weekly.    NURSING PLAN OF CARE ORDERS (X):    Dressing changes: See Dressing Care orders: X  Skin care: See Skin Care orders:   Rectal tube care: See Rectal Tube Care orders:   Other orders:      WOUND TEAM PLAN OF CARE:   Dressing changes by wound team:        Follow up 3  times weekly:                NPWT change 3 times weekly:  X,  NPWT changes 3x/ weekly with 2 layer compression wrap.  Follow up 1-2 times weekly:      Follow up Bi-Monthly:                   Follow up as needed:       Other (explain):     Anticipated discharge plans:  LTACH:        SNF/Rehab: X - patient will need ongoing wound care for LLE upon discharge - 3x/weekly           Home Health Care:           Outpatient Wound Center:            Self Care:

## 2021-03-22 NOTE — CARE PLAN
Problem: Safety  Goal: Will remain free from falls  Description: Pt mobilizes frequently independently.   Outcome: PROGRESSING AS EXPECTED  Note: Hourly rounding.  Non-skid socks. Bed locked & in low position. Personal belongings and call light  within reach. .      Problem: Pain Management  Goal: Pain level will decrease to patient's comfort goal  Outcome: PROGRESSING AS EXPECTED  Note: Taught pt 0-10 pain scale and  non pharmacological method of pain mgt, encouraged to verbalize when in pain. Administered PRN pain med as needed.       Continued Stay Note  [facility]     Patient Name: Manjeet Torres  MRN: [id number]  Today's Date: 2019    Admit Date: 2019    Discharge Plan     Row Name 02/14/19 1119       Plan    Plan  Will follow.     Plan Comments  Met with birth mother before she left AMA. She did admit to daily fentanyl abuse. She does not have custody of her other children. She reported she had a home and declined any resources. Mother did complete adoption packet and is working with Yarelis watts [phone] or [phone] and Anay at [facility] adoption agency [phone]. Adoptive parents are Akosua and Kevin Montanez [address] [place] [address]. Adoptive mother has fourth baby band. Adoptive FOB's drivers license is in chart. They are both aware of fentanyl abuse and have been educatedon [facility]. Adoptive parents have POA of pt.     Final Discharge Disposition Code  01 - home or self-care        Discharge Codes    No documentation.             ANTONIA Tavarez

## 2021-03-22 NOTE — CARE PLAN
Problem: Safety  Goal: Will remain free from injury  Outcome: PROGRESSING AS EXPECTED     Problem: Infection  Goal: Will remain free from infection  Outcome: PROGRESSING AS EXPECTED     Problem: Pain Management  Goal: Pain level will decrease to patient's comfort goal  Outcome: PROGRESSING AS EXPECTED     Problem: Psychosocial Needs:  Goal: Level of anxiety will decrease  Outcome: PROGRESSING AS EXPECTED       VSS. Pain managed per mar. Wound vac to be changed today. Pending placement to .

## 2021-03-22 NOTE — DISCHARGE PLANNING
"Anticipated Discharge Disposition: Group home    Action: No changes, LSW awaiting response from public guardian regarding pts finances for [facility] placement.      Barriers to Discharge: placement     Plan: LSW to f/u with guardian     LSW received f/u email from guardian:  \"No new info to provide yet except that his case is taking a little longer than normal to figure out. We have still not located any bank accounts and as of 3/15 social security had still not processed our representative payee application and will not release any financial information to us. Once our SSA application is processed (we follow-up weekly) and they can release information to us we should be able to get the ball rolling. \"    "

## 2021-03-22 NOTE — PROGRESS NOTES
[facility] Medicine Twice Weekly Progress Note    Date of Service  3/14/2021    Chief Complaint  Wound Infection    Hospital Course  Mr. Varela is a 65-year-old male with PMH alcohol dependence, tobacco dependence, psychiatric disorder, and HTN who presented to the emergency department 8/7/2020 with a left lower extremity wound infection. He was hospitalized at our facility in April and evaluated by orthopedic surgery who recommended wound care. Wound cultures at that time grew MSSA and Streptococcus. He had been seen at [facility] earlier that week and prescribed antibiotics, however he had not filled the prescription.  An ultrasound of that extremity was done and was negative for DVT.  He was treated for sepsis and completed an antibiotic course on 9/16/2020. He was also found to have head lice and underwent treatment for that.  A repeat wound culture was done on 9/28/2020 and grew Strep A and Proteus. Infectious disease was consulted and recommended a 5-day course of IV zosyn which was completed on 10/5/2020. On 10/12/2020 the wound care team noticed increased inflammation to the proximal part of the wound bed and switched from a vera flow wound VAC to a regular wound VAC.  ID was again consulted and on 10/14/2020 recommended to 7-day course of antibiotics with meropenem which was completed on 10/22/2020. Additionally, he was noted to have intermittent agitation so was started on risperdal, depakote, and gabapentin per psychiatric recommendations.  On 11/20/2020 he underwent left lower extremity debridement with wound VAC placement. Since then he has remained stable.  He has been deemed incapacitated to make medical decisions and guardianship was granted on 1/29/21. Placement will likely be in a group home.     Interval Problem Update  Patient ambulatory in room, pleasant and cooperative.  He is oriented only to himself and situation.  He agrees his pain is well managed on current regimen.  He denies any  "other problems.  Wound care team reports wound odor and \"failing to contract.\"   -CBC with differential not indicative of infection  -Repeat wound culture not growing organisms, continue to follow  -Pain well managed on current regimen  -Continue guardianship process    Consultants/Specialty  -LPS  -Psychiatry  -Infectious Disease    Code Status  Full Code    Disposition  guardianship granted 1/29/21. SW/CM may attempt to start working on placement.  Obtaining financials.    Review of Systems  Review of Systems   Constitutional: Negative for chills, fever and malaise/fatigue.   HENT: Negative for congestion and sore throat.    Respiratory: Negative for cough and shortness of breath.    Cardiovascular: Negative for chest pain.   Gastrointestinal: Negative for abdominal pain, diarrhea, nausea and vomiting.   Genitourinary: Negative for dysuria, frequency and urgency.   Musculoskeletal: Positive for joint pain and myalgias.   Skin: Negative for itching and rash.   Neurological: Negative for dizziness and headaches.        Physical Exam  Temp:  [36.3 °C (97.3 °F)-36.5 °C (97.7 °F)] 36.4 °C (97.6 °F)  Pulse:  [79-91] 91  Resp:  [14-16] 14  BP: (106-141)/(70-81) 114/70  SpO2:  [95 %-96 %] 95 %    Physical Exam  Vitals and nursing note reviewed.   HENT:      Head: Normocephalic and atraumatic.      Nose: Nose normal.   Eyes:      General:         Right eye: No discharge.         Left eye: No discharge.      Conjunctiva/sclera: Conjunctivae normal.      Pupils: Pupils are equal, round, and reactive to light.   Cardiovascular:      Rate and Rhythm: Normal rate.   Pulmonary:      Effort: Pulmonary effort is normal. No respiratory distress.   Abdominal:      General: Abdomen is flat. There is no distension.      Tenderness: There is no abdominal tenderness. There is no guarding.   Musculoskeletal:      Cervical back: Neck supple.      Right lower leg: No edema.      Left lower leg: No edema.      Comments: LLE with decreased " "ROM and tenderness to palpation.negative pressure wound VAC in place and functioning appropriately          Skin:     General: Skin is warm and dry.   Neurological:      General: No focal deficit present.      Mental Status: He is alert.   Psychiatric:         Attention and Perception: Attention normal.         Mood and Affect: Mood normal.         Speech: Speech normal.         Behavior: Behavior is cooperative.         Cognition and Memory: Cognition is impaired. Memory is impaired.     All systems reviewed and exam is unchanged from prior exam.    Fluids    Intake/Output Summary (Last 24 hours) at 3/21/2021 1933  Last data filed at 3/20/2021 2120  Gross per 24 hour   Intake 500 ml   Output --   Net 500 ml       Laboratory                        Imaging  None recent    Assessment/Plan  * Wound of left leg- (present on admission)  Assessment & Plan  -Wound vac - he intermittently dislodges. Requires ongoing education  -Further management per wound care/LPS/ortho.   -Pain control. Long acting pain medications effective. continue as needed at lower dose.  -Improving slowly overall, however most recent wound care note states it is \"failing to contract\" and \"odor\"  - repeat wound culture not growing organisms, follow  -CBC not indicative of infection  Stable    Encephalopathy- (present on admission)  Assessment & Plan  -Improved   -Per psychiatry and Dr. Velázquez on 1/11: Patient is incapacitated.  -Public Guardian: Coretta   -Attempting placement; possibly [facility]    Psychiatric disorder- (present on admission)  Assessment & Plan  -Unspecified.  -Continue Risperdal and Depakote.  Stable on current treatment.  -Psychiatry has consulted and deemed him incapacitated to leave [place] or make medical decisions.  -Initial cognitive evaluation 8/20.   -SLP repeated Cognitive eval 1/2021- continue to recommend 24/7 supervision   -Public Guardian: Coretta   -Pending GH; obtaining financial " status  Stable    Essential hypertension- (present on admission)  Assessment & Plan  -Well controlled on amlodipine    Emphysema/COPD (HCC)- (present on admission)  Assessment & Plan  -Chronic and stable off medication, without acute exacerbation    Protein malnutrition (HCC)- (present on admission)  Assessment & Plan  -Body mass index is 21.35 kg/m².   -Continue TID supplements.  -Encourage PO intake  Improving    Flexion contracture of knee, 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)  Assessment & Plan  -Secondary to chronic wound in that area.  -PT/OT.  -Does not seem to limit his mobility. Is frequently ambulatory.  -Continue PRN flexeril . Robaxin added 3/14   Encourage ambulation    Infection of wound due to methicillin resistant Staphylococcus aureus (MRSA)- (present on admission)  Assessment & Plan  -History of MRSA.  -Poor compliance with OP wound care. Poor compliance with wound vac at times.   -Continue pain control as needed.  -LPS and wound care following - debridements and wound VAC changes per their recommendations  -Cultures repeated 12/14 - positive for Proteus species, pan sensitive.  Completed regimen of augmentin.   -3/18 wound care team reports odor, repeat wound culture so far negative  Likely resolved       VTE prophylaxis: Ambulatory       FELIPE Hull have performed a physical exam and reviewed and updated ROS and Plan today (3/21/2021). In review of previous note,  there are no changes except as documented above.

## 2021-03-23 PROCEDURE — 700102 HCHG RX REV CODE 250 W/ 637 OVERRIDE(OP): Performed by: NURSE PRACTITIONER

## 2021-03-23 PROCEDURE — 770001 HCHG ROOM/CARE - MED/SURG/GYN PRIV*

## 2021-03-23 PROCEDURE — A9270 NON-COVERED ITEM OR SERVICE: HCPCS | Performed by: NURSE PRACTITIONER

## 2021-03-23 PROCEDURE — 700101 HCHG RX REV CODE 250: Performed by: NURSE PRACTITIONER

## 2021-03-23 PROCEDURE — 700111 HCHG RX REV CODE 636 W/ 250 OVERRIDE (IP): Performed by: NURSE PRACTITIONER

## 2021-03-23 RX ADMIN — METHOCARBAMOL TABLETS 500 MG: 500 TABLET, COATED ORAL at 17:30

## 2021-03-23 RX ADMIN — MORPHINE SULFATE 15 MG: 15 TABLET, FILM COATED, EXTENDED RELEASE ORAL at 04:43

## 2021-03-23 RX ADMIN — DIVALPROEX SODIUM 125 MG: 125 CAPSULE ORAL at 13:54

## 2021-03-23 RX ADMIN — GABAPENTIN 300 MG: 300 CAPSULE ORAL at 12:13

## 2021-03-23 RX ADMIN — OXYCODONE 5 MG: 5 TABLET ORAL at 12:13

## 2021-03-23 RX ADMIN — GABAPENTIN 300 MG: 300 CAPSULE ORAL at 04:44

## 2021-03-23 RX ADMIN — GABAPENTIN 300 MG: 300 CAPSULE ORAL at 17:30

## 2021-03-23 RX ADMIN — METHOCARBAMOL TABLETS 500 MG: 500 TABLET, COATED ORAL at 12:13

## 2021-03-23 RX ADMIN — RISPERIDONE 0.5 MG: 0.5 TABLET ORAL at 04:43

## 2021-03-23 RX ADMIN — OXYCODONE 5 MG: 5 TABLET ORAL at 20:51

## 2021-03-23 RX ADMIN — RISPERIDONE 1 MG: 1 TABLET ORAL at 17:30

## 2021-03-23 RX ADMIN — LIDOCAINE 1 PATCH: 50 PATCH CUTANEOUS at 12:13

## 2021-03-23 RX ADMIN — DIVALPROEX SODIUM 125 MG: 125 CAPSULE ORAL at 20:51

## 2021-03-23 RX ADMIN — METHOCARBAMOL TABLETS 500 MG: 500 TABLET, COATED ORAL at 04:44

## 2021-03-23 RX ADMIN — DIVALPROEX SODIUM 125 MG: 125 CAPSULE ORAL at 04:43

## 2021-03-23 RX ADMIN — AMLODIPINE BESYLATE 5 MG: 5 TABLET ORAL at 04:43

## 2021-03-23 RX ADMIN — ENOXAPARIN SODIUM 40 MG: 40 INJECTION SUBCUTANEOUS at 04:43

## 2021-03-23 RX ADMIN — MORPHINE SULFATE 15 MG: 15 TABLET, FILM COATED, EXTENDED RELEASE ORAL at 17:30

## 2021-03-23 RX ADMIN — METHOCARBAMOL TABLETS 500 MG: 500 TABLET, COATED ORAL at 20:51

## 2021-03-23 ASSESSMENT — FIBROSIS 4 INDEX: FIB4 SCORE: 0.78

## 2021-03-23 ASSESSMENT — PAIN DESCRIPTION - PAIN TYPE
TYPE: ACUTE PAIN

## 2021-03-23 NOTE — DISCHARGE PLANNING
LSW spoke with pts guardian Coretta. At this time, they have submitted a request to Social Security who has not responded to their request. They are unable to verify if/how much SS pt was receiving or where the money even went. Unable to tell if it was going to a bank account or debit card. Coretta stated they are unable to verify anything about pts finances.     LSW will update supervisor.

## 2021-03-23 NOTE — CARE PLAN
Problem: Safety  Goal: Will remain free from injury  Outcome: PROGRESSING AS EXPECTED   Patient is safe when ambulating. Patient is wearing his shoes, bed locked in the lowest position.     Problem: Mobility  Goal: Risk for activity intolerance will decrease  Outcome: PROGRESSING AS EXPECTED  Patient ambulating frequently in room.

## 2021-03-24 PROCEDURE — A9270 NON-COVERED ITEM OR SERVICE: HCPCS | Performed by: NURSE PRACTITIONER

## 2021-03-24 PROCEDURE — 700102 HCHG RX REV CODE 250 W/ 637 OVERRIDE(OP): Performed by: NURSE PRACTITIONER

## 2021-03-24 PROCEDURE — 770001 HCHG ROOM/CARE - MED/SURG/GYN PRIV*

## 2021-03-24 PROCEDURE — 97605 NEG PRS WND THER DME<=50SQCM: CPT

## 2021-03-24 PROCEDURE — 700111 HCHG RX REV CODE 636 W/ 250 OVERRIDE (IP): Performed by: NURSE PRACTITIONER

## 2021-03-24 PROCEDURE — 99231 SBSQ HOSP IP/OBS SF/LOW 25: CPT | Performed by: NURSE PRACTITIONER

## 2021-03-24 PROCEDURE — 700101 HCHG RX REV CODE 250: Performed by: NURSE PRACTITIONER

## 2021-03-24 RX ADMIN — AMLODIPINE BESYLATE 5 MG: 5 TABLET ORAL at 06:48

## 2021-03-24 RX ADMIN — DIVALPROEX SODIUM 125 MG: 125 CAPSULE ORAL at 21:50

## 2021-03-24 RX ADMIN — ENOXAPARIN SODIUM 40 MG: 40 INJECTION SUBCUTANEOUS at 06:48

## 2021-03-24 RX ADMIN — METHOCARBAMOL TABLETS 500 MG: 500 TABLET, COATED ORAL at 06:48

## 2021-03-24 RX ADMIN — GABAPENTIN 300 MG: 300 CAPSULE ORAL at 06:48

## 2021-03-24 RX ADMIN — LIDOCAINE 1 PATCH: 50 PATCH CUTANEOUS at 13:04

## 2021-03-24 RX ADMIN — METHOCARBAMOL TABLETS 500 MG: 500 TABLET, COATED ORAL at 21:50

## 2021-03-24 RX ADMIN — MORPHINE SULFATE 15 MG: 15 TABLET, FILM COATED, EXTENDED RELEASE ORAL at 06:00

## 2021-03-24 RX ADMIN — METHOCARBAMOL TABLETS 500 MG: 500 TABLET, COATED ORAL at 18:03

## 2021-03-24 RX ADMIN — DIVALPROEX SODIUM 125 MG: 125 CAPSULE ORAL at 13:04

## 2021-03-24 RX ADMIN — OXYCODONE 5 MG: 5 TABLET ORAL at 21:50

## 2021-03-24 RX ADMIN — METHOCARBAMOL TABLETS 500 MG: 500 TABLET, COATED ORAL at 13:04

## 2021-03-24 RX ADMIN — GABAPENTIN 300 MG: 300 CAPSULE ORAL at 13:04

## 2021-03-24 RX ADMIN — RISPERIDONE 0.5 MG: 0.5 TABLET ORAL at 06:48

## 2021-03-24 RX ADMIN — MORPHINE SULFATE 15 MG: 15 TABLET, FILM COATED, EXTENDED RELEASE ORAL at 18:03

## 2021-03-24 RX ADMIN — OXYCODONE 5 MG: 5 TABLET ORAL at 13:04

## 2021-03-24 RX ADMIN — RISPERIDONE 1 MG: 1 TABLET ORAL at 18:03

## 2021-03-24 RX ADMIN — DIVALPROEX SODIUM 125 MG: 125 CAPSULE ORAL at 06:48

## 2021-03-24 RX ADMIN — OXYCODONE 5 MG: 5 TABLET ORAL at 06:48

## 2021-03-24 RX ADMIN — GABAPENTIN 300 MG: 300 CAPSULE ORAL at 18:03

## 2021-03-24 ASSESSMENT — PAIN DESCRIPTION - PAIN TYPE
TYPE: CHRONIC PAIN
TYPE: ACUTE PAIN
TYPE: CHRONIC PAIN
TYPE: CHRONIC PAIN

## 2021-03-24 NOTE — PROGRESS NOTES
"WOUND CARE PROVIDER PROGRESS NOTE        HPI: 65-year-old male homelessness, EtOH use, tobacco dependence, chronic left lower extremity wound admitted 8/7/2020 with left lower extremity wound infection.  Patient was recently hospitalized at [facility] in April 2020, evaluated by orthopedic surgery who recommended wound care.  Wound culture grew MSSA and strep.  Patient was recently seen at [facility] prior to this admission was given a prescription for antibiotics but did not fill this.  Patient reports that he has had this wound for 8 to 10 years.  He reports that he fell and went \"ass over tea kettle\".  He reports that he would clean the wound almost daily with soap and water at the homeless shelter.  Per chart review, patient reported he developed the ulcer in May 2018 when he fell while hiking.  Patient was seen at wound care clinic in August 2018.  Patient had a  assisting him while he was living at [facility].  Wound VAC /was ordered however  had concerns with patient's compliancy and/ access to medical care.  Skilled nursing facility was contacted to have patient be admitted, however he was not accepted due to Medicaid.    Not on any blood thinners.  Patient ambulatory in halls.  Allergies reviewed.  He denies any allergies.    Surgery date: 9/1/2020 by Belkis ELLER  Procedure: Excision debridement to left leg ulcer with injectable lidocaine and epinephrine     Surgery date: 11/19/2020 by Dr. Olvera  Procedure:1.  Irrigation and debridement of multiple compartments of the left leg with 2   separate 16 cm x 5 cm superficial ulcerations in posterior knee and calf.  2.  AmnioFix biologic sheet placement, left leg.  3.  Wound VAC placement, left leg, 16x5 + 16x5 cm.      Surgery date: 2/24/21 by Belkis ELLER  Procedure: Excision debridement to left leg ulcer with injectable lidocaine and epinephrine             Interval hx:   9/11/2020: pt calm cooperative. On " zyvox. Seen during VAC and two layer compression wrap change. Pt tolerating VAC and wraps. Wound decreased in size.   9/18/2020: Patient denies any fevers, chills, nausea, vomiting.  He is tolerating the veraflo wound VAC and 2 layer compression wrap.  Wound is decreasing in size.  Increased granular tissue noted, wound edges have improved however he does have rolled edges to popliteal fossa area.  Patient calm and cooperative, watching sports.  9/30/2020: Denies fevers, chills, nausea, vomiting.  Tolerating wound VAC and 2 layer compression wrap.  Refused nail care.  Wound culture positive for strep A and Proteus.  10/7/2020: completed 5 day course of zyvox. Denies fevers chills nausea vomiting.  Seen during veraflo VAC and 2 layer compression wrap change.    10/14/2020: Patient denies fevers, chills, nausea, vomiting.  Patient wound is malodorous complaining of increased pain to the wound.  Increased slough noted to wound bed despite veraflo wound VAC.  Reconsult ID.  10/16/2020: Patient seen during wound VAC change with [facility] wound care PT. patient denies fever, chills, nausea, vomiting.  Patient reports pain to wound and increase in pain with wound VAC change.  Patient has granular tissue throughout wound with mild amount of slough to posterior aspect of leg.  Malodorous with VAC removal.  Wound VAC nursing changed.  10/29/2020: Wound VAC was discontinued by wound team on 10/23/2020 due to slow progress and collagen was started.  Patient has completed 7-day antibiotic course of meropenem for multidrug resistant Proteus vulgaris.  Patient found to have lice and has been treated.  Nonfoul-smelling odor present with dressing removal.  11/5/2020: Seen with wound team during 2 layer compression dressing change and for excisional debridement.  No odor noted today.  Thick layer of biofilm noted.  Wound edges continue to improve but still remain to medial aspect of wound.  11/17/2020: Seen with wound team during  dressing and 2 layer compression wrap change.  Patient with severely thick scar tissue to wound edges.  Excisional debridement with injectable lidocaine and epinephrine performed today.  Patient denies any fevers, chills, nausea, vomiting.  11/23/2020: POD #4 SP left leg I&D with biologic and VAC placement.  VAC functioning.  Foul odor coming from wound.  11/30/2020: POD #11.  Patient tired of walking around with wound VAC machine.  But agreeable to continue with wound VAC.  Mild odor coming from wound with wound VAC removal.  12/7/2020: POD #18.  Seen during wound VAC change.  Sitter no longer at bedside.  12/14/2020 POD # 25.  Seen during wound VAC change with wound team.  Previous wound culture from last week showed organisms but they were not worked up.  New wound culture to be taken today.  Odor remains slightly diminished.  Drainage heavy, slightly decreased from last week.  Continue with nystatin powder and silver foam.  Patient tired today.  12/16/2020: Wound culture 12/14/2020 + for Proteus Mirabilis.  Discussed case with ID, previously following this admit.  Recommended Augmentin for 1 week.  12/17/2020: Patient not drinking boost.  Has stockpile in room.  Dietary consulted.  Started Augmentin yesterday. Contacted micro to review culture.  So far growing Proteus and diphtheroids.  Micro to assess for fungal component.  1/4/2021: Wounds have  into 2 wounds again and are decreasing in size.  No longer doing silver foam but rather Arglaes powder and nystatin powder.  Odor remains.  Completed antibiotics.  Guardianship hearing is 1/29/2021.  Had arginaid supplement 4 times per day for 2 weeks that started on 12/17/2020.  1/22/2021: Seen with wound team for VAC change.  Odor is decreased.  Skin bridges have formed and now patient has 3 smaller wounds.  However there is increased periwound breakdown.  Patient experiencing pain with VAC change to proximal thigh.  Topical lidocaine used.  Wound VAC held  for weekend due to periwound breakdown.  2/5/2021: Seen with wound team during VAC change.  Odor remains the same.  There is no longer skin bridge to the medial wound  it into 2.  Patient has drainage seeping out underneath the drape causing periwound irritation.  Excisional debridement was performed today.  Patient has guardian.  2/24/21: Seen with wound team during VAC change.  Odor remains.  Epibole to skin edges.  There is significant amount of slough and tenderness to lateral proximal thigh ulcer.  Excisional debridement performed today with injectable lidocaine and epinephrine.  Consent obtained from guardian.  3/3/21: POD # 7 s/p bedside I & D. Restarted L arginine supplements. Has not drank supplement yet today.   3/10/2021: POD #14.  Drinking L-arginine supplements.  Showered before wound VAC change.  Patient trimmed his toenails and fingernails last week.  3/24/2021: Has completed L-arginine supplements.  Wound remains unchanged.  Consider plastic surgery consult for skin graft.        Results:  Wound culture left leg 12/14/2020: No fungal growth, Proteus mirabilis  Wound culture left leg 12/7/2020:Light growth mixed organisms.   Covid screen not detected 11/17/2020  CBC 11/22/2020: WBC 6.5, hemoglobin and hematocrit 9.9/30.5.  Platelet count 403.  CBC with differential 10/14/2020: WBC 6, H&H 11/33.9  Wound culture: 9/28/2020: Positive for beta-hemolytic strep group A, Proteus.    Wound cx: 9/1/2020 mrsa, diphtheroids, beta hemolytic strep group A    8/7/2020: X-ray tib-fib left:  1.  Healed distal metaphyseal fractures of the left fibula and tibia with tibial IM layla in place.     2.  No acute fracture or dislocation.     3.  No radiopaque soft tissue foreign body.      Arterial studies:   9/2/2020  Right.    Doppler waveforms of the common femoral artery are of high amplitude and    triphasic.    Doppler waveforms at the ankle are brisk and triphasic.    Ankle-brachial index is normal.        Left.    Doppler waveforms of the common femoral artery are of high amplitude and    triphasic.    Doppler waveforms at the ankle are brisk and triphasic.    Ankle-brachial index is normal.    Unable to obtained popliteal waveforms due to wound bandages.          Exam:    Palpable PT pulse to left foot +1 foot   +2 DP left foot  Difficulty palpating popliteal due to scar tissue  Able to extend left leg to 160 degrees. Able to flex left leg around 80 degrees            Left lower leg full-thickness ulcer  Odor remains the same.  Drainage has decreased slightly  Medial wound is no longer .        Lateral ulcer:  Wound edges flat, except for approximately 2 cm area to medial edge  Thin biofilm, increased in granular tissue to proximal aspect   slight tenderness to proximal aspect  No periwound erythema  periwound improved since using hydrocolloid  Edema controlled  Measures: 13 x 4 x 0.1cm      Medial ulcer:  Wound edges flat  Thin biofilm over wound  Edema controlled  No periwound irritation noted   measurement: 13.5 x 3.5 x 0.1cm         - CSWD and Wound care completed by wound RN refer to flowsheet.  -Patient tolerated the procedure well, without c/o pain or discomfort.     Lateral wound      Medial wound                  ASSESSMENT/PLAN:  65-year-old male with homelessness, EtOH use, tobacco use, and chronic left leg ulcer.  SP left leg I&D with amnio fix and wound VAC placement by Dr. Olvera on 11/19/2020    -Wound remains as 2 wounds.  Skin bridge to medial wound is no longer present   Wound size slightly decreased but remains essentially unchanged.   -L arginine supplements restarted 3/2/21.  Patient has completed 2-week course of supplements.  -periwound irritation resolved with hydrocolloid.  -Odor remains, heavy drainage slightly decreased  -biofilm debrided each visit  -Edema controlled with 2 layer compression wrap.  -Patient has completed 1 week of Augmentin and L-arginine supplement for 2 weeks  in December 2020  -No plans for [employer] at this time.  -Patient to shower before each wound VAC change    PLAN  -Continue wound VAC.  Change 3 times per week.  Contract wound when applying drape.  Discussed with wound team.  - hydrocolloid to periwound, Vanessa, black foam, drape to wound at 150 mmHg continuous  -Continue 3 times a week debridement to wound to reduce biofilm  -Continue 2 layer compression wrap extending to thigh  -Consider plastic surgery consult for split-thickness skin graft.  Wound is at skin level.  -shower prior to VAC changes, ok to remove 2 layer compression wrap to shower    Patient to continue to be seen by wound care team while admitted   wound care nurse practitioner to follow as needed      Discharge plan:  Working on group home placement.  Patient has guardian.      D/W: Patient, RN, RN with wound team,

## 2021-03-24 NOTE — WOUND TEAM
[facility]  Inpatient Services  Wound and Skin Care Progress Note    Admission Date: 8/7/2020     Last order of IP CONSULT TO WOUND CARE was found on 9/1/2020 from Hospital Encounter on 8/7/2020     HPI, PMH, SH: Reviewed    Unit where seen by Wound Team: [id number]    WOUND CONSULT/FOLLOW UP RELATED TO:  Left leg scheduled negative pressure wound therapy (npwt) dressing change and 2 layer compression wrap change    OBJECTIVE: NPWT dressing and 2 layer compression wrap in place. Patient on pressure redistribution mattress, up self, ambulates frequently, turns self.    WOUND TYPE, LOCATION, CHARACTERISTICS (Pressure Injuries: location, stage, POA or date identified)        Negative Pressure Wound Therapy 11/20/20 Leg Lateral;Posterior;Medial Left (Active)   Vacuum Serial Number [id number] 03/01/21 1000   NPWT Pump Mode / Pressure Setting Intermittent;150 mmHg 03/24/21 1500   Dressing Type Medium;Black Foam (Regular) 03/24/21 1500   Number of Foam Pieces Used 3 03/24/21 1500   Canister Changed No 03/24/21 1500   Output (mL) 200 mL 03/16/21 0800   NEXT Dressing Change/Treatment Date 03/26/21 03/24/21 1500   WOUND NURSE ONLY - Time Spent with Patient (mins) 90 03/17/21 1500           Wound 11/19/20 Full Thickness Wound Leg Lateral;Posterior;Medial Left (Active)   Wound Image    03/24/21 1500   Site Assessment Red;Yellow;Bleeding 03/24/21 1500   Periwound Assessment Scar tissue 03/24/21 1500   Margins Attached edges 03/24/21 1500   Closure Secondary intention 03/24/21 1500   Drainage Amount Moderate 03/24/21 1500   Drainage Description Serosanguineous 03/24/21 1500   Treatments Cleansed;Site care;CSWD - Conservative Sharp Wound Debridement 03/24/21 1500   Wound Cleansing Approved Wound Cleanser 03/24/21 1500   Periwound Protectant Skin Protectant Wipes to Periwound;Hydrocolloid 03/24/21 1500   Dressing Cleansing/Solutions Not Applicable 03/24/21 1500   Dressing Options Wound Vac;Compression Wrap Two Layer  03/24/21 1500   Dressing Changed Changed 03/24/21 1500   Dressing Status Clean;Dry;Intact 03/24/21 1500   Dressing Change/Treatment Frequency Monday, Wednesday, Friday, and As Needed 03/24/21 1500   NEXT Dressing Change/Treatment Date 03/26/21 03/24/21 1500   NEXT Weekly Photo (Inpatient Only) 03/31/21 03/24/21 1500   Non-staged Wound Description Full thickness 03/24/21 1500   Wound Length (cm) 12.6 cm 02/22/21 0830   Wound Width (cm) 3 cm 02/22/21 0830   Wound Depth (cm) 0.3 cm 03/24/21 1500   Wound Surface Area (cm^2) 37.8 cm^2 02/22/21 0830   Wound Volume (cm^3) 11.34 cm^3 02/22/21 0830   Post-Procedure Length (cm) 15 cm 02/03/21 1100   Post-Procedure Width (cm) 3.4 cm 02/03/21 1100   Post-Procedure Depth (cm) 0.2 cm 02/03/21 1100   Post-Procedure Surface Area (cm^2) 51 cm^2 02/03/21 1100   Post-Procedure Volume (cm^3) 10.2 cm^3 02/03/21 1100   Wound Healing % -11 02/22/21 0830   Wound Bed Granulation (%) 100 % 03/24/21 1500   Wound Bed Slough (%) 10 % 03/17/21 1500   Wound Bed Granulation (%) - Post-Procedure 100 % 03/24/21 1500   Tunneling (cm) 0 cm 02/10/21 1400   Undermining (cm) 0 cm 02/10/21 1400   Shape two rectangules  03/22/21 1400   Wound Odor Strong;Foul 03/24/21 1500   Pulses Left;2+;DP;PT 03/15/21 1600   Exposed Structures None 03/24/21 1500   WOUND NURSE ONLY - Time Spent with Patient (mins) 60 03/24/21 1500     Lab Values:    Lab Results   Component Value Date/Time    WBC 5.9 03/18/2021 11:09 AM    RBC 4.19 (L) 03/18/2021 11:09 AM    HEMOGLOBIN 11.2 (L) 03/18/2021 11:09 AM    HEMATOCRIT 34.2 (L) 03/18/2021 11:09 AM    CREACTPROT 1.07 (H) 08/12/2020 01:55 PM    SEDRATEWES 85 (H) 08/07/2020 01:20 PM    HBA1C 5.7 (H) 11/09/2018 06:30 PM      Culture Results show:  Recent Results (from the past 720 hour(s))   CULTURE WOUND W/ GRAM STAIN    Collection Time: 09/01/20  2:00 PM    Specimen: Left Leg; Wound   Result Value Ref Range    Significant Indicator POS (POS)     Source WND     Site LEFT LEG      Culture Result - (A)     Gram Stain Result       Moderate Gram positive cocci.  Moderate Gram positive rods.      Culture Result (A)      Beta Hemolytic Streptococcus group A  Moderate growth      Culture Result (A)      Methicillin Resistant Staphylococcus aureus  Moderate growth      Culture Result (A)      Diphtheroids  Moderate growth  Mixed morphologies.     KOSTAS: 9/20/21   Left.    Doppler waveforms of the common femoral artery are of high amplitude and    triphasic.    Doppler waveforms at the ankle are brisk and triphasic.    Ankle-brachial index is normal.    Unable to obtained popliteal waveforms due to wound bandages.     Self Report / Pain Level:  Pt reported pain with dressing removal but denied pain for the rest of the procedure.     INTERVENTIONS BY WOUND TEAM: INTERVENTIONS BY WOUND TEAM:  Performed standard wound care which includes appropriate positioning, dressing removal and non-selective debridement. Pictures and measurements obtained weekly if/when required.  Cleansed: Wound cleanser and gauze used to clean wound beds and periwound  Debridement: Sharp debridement via curette performered by EKATERINA Zamora   Periwound - Skin protectant swabs and hydrocolloid used around wound perimeter and to form bridge  Primary - Vanessa to wound beds, 2 piece of black foam half thickness cut to fit and placed to each wound bed and 1 black piece used to bridge- all secured with drape, restarted NPWT 150mmHg, Intermittent 3/3 no leaks noted.   Secondary - 2 layer compression wrap foot/leg/thigh     Interdisciplinary consultation: Patient, Bedside RN, Wound care RN Asaf Zamora APRN    EVALUATION / RATIONALE FOR TREATMENT:    3/24/21 Wound bed red, with less drainage noted.  Strong, foul odor still present.  APRN to see pt regarding wound bed. Continue with current plan of care.   3/22/21: Area unchanged from previous dressing change.  Wound malodorous.  Meredith-skin dry and flaky at superior wound border,  hydocolloid thin dressing placed.   3/13/21 Wounds failing to contract.  Will trial intermittent wound vac setting to encourage greater stimulation of cellular activity in wound bed.    3/10/2021: debrided wound, odor present, continue NPWT  3/6/12 area unchanged, odor persists  3/1/21: Lidocaine unavailable during this dressing change. Small amount of debridement completed with curette. Continued with silver powder for its antimicrobial properties.   2/27/21 skin bridge enlarging  2/24/2021: Excisional debridement by LPS APRN performed for rolled edges that were starting to form along the posterior and medial left leg wound. Moderate bleeding managed by administration of lidocaine injection. CSWD performed for slough. Wound bed is beefy red following debridement. Resume agata and silver foam to manage bioburden. Continue with CSWD with each dressing change.   2/22/2021: Rolled edges starting to form along posterior thigh wound bed - may require excisional debridement by provider. Minimal changes to actual wound bed. Continue CSWD, agata, and silver foam to manage bioburden.   2/19/21: minimal to no changes from last assessment. Continue CSWD, agata, and silver foam for bioburden.  2/17/2021: Periwound remains intact. Adherent slough noted over wound bed. Scant bleeding noted on medial wound following CSWD. Continue with Agata over wound beds to further stimulate epithelization. Continue with silver foam to manage bioburden.   2/12/2021:periwound intact does not appear macerated.  Moderate amount of drainage. Wound bed debrided.   Picture frame draped over periwound, three silver foams used.  Continue NPWT and two layer compression.    2/10/21: Periwound looks less denuded today. Did not place the arglaes powder to wound bed - switching to Agata to see if it will stimulate healing in his wound bed. Did not use hydrofiber to periwound but it is in the room for next visit in case it is needed. Ordered and used  liquid lidocaine to remove vac dressing and lidocaine gel to the posterior proximal part of wound. Patient tolerated debridement well with lidocaine.   02/05/21: Addison wound is denuded and wet, vac drape lifting under wrap, Aquacel to dry wet and denuded skin, no silver foam available this dressing change.  02/03/21: Measurements are smaller for both medial and posterior thigh.  Wound bed has granulation and non-viable slough.  Will continue to debride every time with wound VAC changes to decrease bioburden in wound bed.   02/01/21:  No change.  Wounds appear to be improving slowly.      01/29/2021: wounds flush with periwound, fully granulated with scant yellow to a few areas. Medial periwound intact, lateral periwound with some erythema. LPS APRN suggested that next time applying brava strips to any irritated periwound may help reduce irritation.   1/27/21: Restarted NPWT, addison-wound looks better.   1/25/21: denuded tissue, vac vacation for until 1/27.  Re-assessment to place wound VAC to left LE.   1/21/21 area has decreased since last measurements.  Odor still present.  Addison skin compromised probably yeast infection under VAC drape.  Will hold VAC for 72 hours and re assess.  Nystatin to address yeast and dry out addison skin  1/15/21: position of leg may affect posterior thigh measurements.  Medial Leg wound area decreased.    01/10/2021: Wound vac replaced, patient accidentally removed vac and bedside RN was unable to repair.  Patient tolerated wound VAC placement. Patient had moderate ss with some green tinge drainage.   01/04/2021: 2 layer compression wrap re-applied to left LE due to ACE wrap leaving too much indentation to left LE.   12/31/2020 thigh wound much narrower.  Biofilm redeveloping and odor still present.   12/27/20: Wound bed granular and odor has lessen but still persists.   12/18/20 wound length decreased.  Odor persists.    12/14/2020: re-cultured wound bed.  12/11/2020 POD 23 Length of wounds  has decreased, width has not changed.  Odor persists.  Pt continues to become angry with VAC when it limits his mobility.    12/7/2020 POD 19 odor persists, improved wound bed after debridement.  Possible bacterial vs yeast vs fungal infection.  Culture taken.  Nystatin to treat possible fungal infection, silver foam to decrease microbial population.    12/4/2020 POD# 16 odor worsening, more yellow tissue present, will add nystatin and silver foam next week and consider a culture  11/30 POD#11 granular buds visible to wound bed.  Same as prior.   11/27 POD#8 wound is odorous likely related to biologic dressing.  No overt signs of infection.  Granular buds are visible through adaptic.  Edges do not appear as thick as they were prior to last sx.  Continue with current treatment.    11/23: POD#4 s/p I&D of left LE wounds and biologic placed by Dr. Olvera on 11/19.  Wound bed is flatter and raised edges scar tissue seems to be gone.   11/14 Posterior thigh aspect of wound deteriorating, will increase frequency of treatment to keep biofilm down and hopefully avoid systemic abx.  Will include thigh in compression wrap.  Will consider culture if improvement is not seen in the next few treatments.    11/10: APRN unavailable at this time.  Will re-schedule excisional debridement to next week.   11/5: Plan for debridement of scarred edges on Tues by LPS. Tendon exposed to posterior aspect of wound, behind knee. Continue with current dressing care.  10/29: Excisional debridement by Asaf ELLER of scar tissue along the proximal edge of the posterior knee and lateral thigh.  Lateral aspect of thigh is flatten.  Medial aspect of knee has thick scar tissue that was excisionally debrided by Asaf ELLER.  Fully granulated throughout the wound bed.   10/23 wound bed mostly granular, superficial in depth, progress has been slow with the wound VAC so will trial collagen product to encourage new tissue growth.    10/19:  wound bed has improved in color and is fully granulated.  Addison-wound has improved, denudation has resolved. APRN continuing with serial weekly debridements as needed.   10/16: wound friable pt now on iv abx per ID.  Indurated edges addressed with drape and foam.  Addison wound likely has yeast infection - addressing with silver powder crusting  10/14: patient seen with Asaf ELLER, stopping veraflo instillation.  Starting silver powder and regular wound VAC to see if it will help with bioburden.  Possible colonization of bacteria.  ID involved.   10/12: Inflammation noted to the proximal part of the wound bed.  Will continue to monitor, possible vac break on Wednesday to help addison-skin inflammation.   10/7: Excisional debridement performed by Asaf ELLER. Will need to continue selective debridement with NPWT changes, and weekly excisional debridement with APRN to flatten out the scar tissue.  IV abx therapy ended 10/5.   10/5: Lateral wound still with some slough, both wounds smaller per measurements. Medial wound with all granular tissue.  9/30: Patient to start abx therapy for 7 days course per Dr. Ellis.  Started dakin's instillation to veraflo  9/28: malodorous today, culture taken.    9/25: Odor noted, though unclear if from wound or pt, consider shower before next Vac change. Consider regular vac next change, wound granular and superficial.   9/23: Patient still awaiting guardianship for placement.   9/18: wound granulating nicely and responding well to current treatment.    9/16: Wound bed with granular tissue, edges are thick but appear better than previous assessments. . NPWT VF to encourage closure by secondary intention and manage drainage while encouraging oxygenation and granulation to the wound bed. 2 layer compression to assist in decrease of swelling and encourage blood flow to wounds. Wound team to follow up and change 3x/week.      Goals: Steady decrease in wound area and depth  weekly.    NURSING PLAN OF CARE ORDERS (X):    Dressing changes: See Dressing Care orders: X  Skin care: See Skin Care orders:   Rectal tube care: See Rectal Tube Care orders:   Other orders:      WOUND TEAM PLAN OF CARE:   Dressing changes by wound team:        Follow up 3 times weekly:                NPWT change 3 times weekly:  X,  NPWT changes 3x/ weekly with 2 layer compression wrap.  Follow up 1-2 times weekly:      Follow up Bi-Monthly:                   Follow up as needed:       Other (explain):     Anticipated discharge plans:  LTACH:        SNF/Rehab: X - patient will need ongoing wound care for LLE upon discharge - 3x/weekly           Home Health Care:           Outpatient Wound Center:            Self Care:

## 2021-03-24 NOTE — CARE PLAN
Problem: Safety  Goal: Will remain free from injury  Outcome: PROGRESSING AS EXPECTED      Problem: Venous Thromboembolism (VTW)/Deep Vein Thrombosis (DVT) Prevention:  Goal: Patient will participate in Venous Thrombosis (VTE)/Deep Vein Thrombosis (DVT)Prevention Measures  Outcome: PROGRESSING AS EXPECTED         /54 Transportaion /52 contact for diastolic less than 60 Blood Glucose of 403 repeat 359 Bp 126/58

## 2021-03-24 NOTE — CARE PLAN
Problem: Pain Management  Goal: Pain level will decrease to patient's comfort goal  3/24/2021 0148 by Coretta Campbell R.N.  Flowsheets (Taken 3/23/2021 2251)  Non Verbal Scale:   Calm   Unlabored Breathing   Sleeping  Note: Pain managed with oral opioid medication. Patient able to sleep comfortably.  3/24/2021 0148 by Coretta Campbell R.N.  Outcome: PROGRESSING AS EXPECTED     Problem: Psychosocial Needs:  Goal: Level of anxiety will decrease  3/24/2021 0148 by Coretta Campbell R.N.  Outcome: PROGRESSING AS EXPECTED  Note: No agitation or anxiety this shift. Patient is pleasant and compliant.  3/24/2021 0148 by Coretta Campbell R.N.  Outcome: PROGRESSING AS EXPECTED

## 2021-03-24 NOTE — CARE PLAN
Problem: Pain Management  Goal: Pain level will decrease to patient's comfort goal  Outcome: PROGRESSING AS EXPECTED   Pain managed well with PRN medication at this time.  Will continue to monitor and implement appropriate interventions PRN.    Problem: Mobility  Goal: Risk for activity intolerance will decrease  Outcome: PROGRESSING AS EXPECTED

## 2021-03-25 PROCEDURE — A9270 NON-COVERED ITEM OR SERVICE: HCPCS | Performed by: NURSE PRACTITIONER

## 2021-03-25 PROCEDURE — 700102 HCHG RX REV CODE 250 W/ 637 OVERRIDE(OP): Performed by: NURSE PRACTITIONER

## 2021-03-25 PROCEDURE — 99231 SBSQ HOSP IP/OBS SF/LOW 25: CPT | Performed by: NURSE PRACTITIONER

## 2021-03-25 PROCEDURE — 700111 HCHG RX REV CODE 636 W/ 250 OVERRIDE (IP): Performed by: NURSE PRACTITIONER

## 2021-03-25 PROCEDURE — 770001 HCHG ROOM/CARE - MED/SURG/GYN PRIV*

## 2021-03-25 RX ADMIN — GABAPENTIN 300 MG: 300 CAPSULE ORAL at 17:33

## 2021-03-25 RX ADMIN — RISPERIDONE 1 MG: 1 TABLET ORAL at 17:33

## 2021-03-25 RX ADMIN — DIVALPROEX SODIUM 125 MG: 125 CAPSULE ORAL at 12:53

## 2021-03-25 RX ADMIN — OXYCODONE 5 MG: 5 TABLET ORAL at 05:30

## 2021-03-25 RX ADMIN — METHOCARBAMOL TABLETS 500 MG: 500 TABLET, COATED ORAL at 20:00

## 2021-03-25 RX ADMIN — METHOCARBAMOL TABLETS 500 MG: 500 TABLET, COATED ORAL at 05:31

## 2021-03-25 RX ADMIN — MORPHINE SULFATE 15 MG: 15 TABLET, FILM COATED, EXTENDED RELEASE ORAL at 17:33

## 2021-03-25 RX ADMIN — DIVALPROEX SODIUM 125 MG: 125 CAPSULE ORAL at 05:30

## 2021-03-25 RX ADMIN — AMLODIPINE BESYLATE 5 MG: 5 TABLET ORAL at 05:30

## 2021-03-25 RX ADMIN — RISPERIDONE 0.5 MG: 0.5 TABLET ORAL at 05:30

## 2021-03-25 RX ADMIN — METHOCARBAMOL TABLETS 500 MG: 500 TABLET, COATED ORAL at 12:53

## 2021-03-25 RX ADMIN — METHOCARBAMOL TABLETS 500 MG: 500 TABLET, COATED ORAL at 17:33

## 2021-03-25 RX ADMIN — GABAPENTIN 300 MG: 300 CAPSULE ORAL at 12:53

## 2021-03-25 RX ADMIN — OXYCODONE 5 MG: 5 TABLET ORAL at 17:33

## 2021-03-25 RX ADMIN — GABAPENTIN 300 MG: 300 CAPSULE ORAL at 05:30

## 2021-03-25 RX ADMIN — MORPHINE SULFATE 15 MG: 15 TABLET, FILM COATED, EXTENDED RELEASE ORAL at 05:31

## 2021-03-25 RX ADMIN — DIVALPROEX SODIUM 125 MG: 125 CAPSULE ORAL at 21:00

## 2021-03-25 RX ADMIN — ENOXAPARIN SODIUM 40 MG: 40 INJECTION SUBCUTANEOUS at 05:30

## 2021-03-25 ASSESSMENT — ENCOUNTER SYMPTOMS
FEVER: 0
HEADACHES: 0
COUGH: 0
NAUSEA: 0
MYALGIAS: 1
SORE THROAT: 0
DIZZINESS: 0
SHORTNESS OF BREATH: 0
DIARRHEA: 0
CHILLS: 0
VOMITING: 0
ABDOMINAL PAIN: 0

## 2021-03-25 ASSESSMENT — PAIN DESCRIPTION - PAIN TYPE
TYPE: ACUTE PAIN
TYPE: CHRONIC PAIN

## 2021-03-25 NOTE — CARE PLAN
Problem: Venous Thromboembolism (VTW)/Deep Vein Thrombosis (DVT) Prevention:  Goal: Patient will participate in Venous Thrombosis (VTE)/Deep Vein Thrombosis (DVT)Prevention Measures  Outcome: PROGRESSING AS EXPECTED  Note: Patient ambulates frequently.      Problem: Pain Management  Goal: Pain level will decrease to patient's comfort goal  Outcome: PROGRESSING AS EXPECTED  Note: Mobilizes frequently w/o complaint of pain.

## 2021-03-25 NOTE — CARE PLAN
Problem: Venous Thromboembolism (VTW)/Deep Vein Thrombosis (DVT) Prevention:  Goal: Patient will participate in Venous Thrombosis (VTE)/Deep Vein Thrombosis (DVT)Prevention Measures  Outcome: PROGRESSING AS EXPECTED   Patient is receiving Lovenox.     Problem: Knowledge Deficit  Goal: Knowledge of disease process/condition, treatment plan, diagnostic tests, and medications will improve  Outcome: PROGRESSING AS EXPECTED  Patient updated on POC. All questions answered.

## 2021-03-25 NOTE — PROGRESS NOTES
Hospital Medicine Twice Weekly Progress Note    Date of Service  3/14/2021    Chief Complaint  Wound Infection    Hospital Course  Mr. Varela is a 65-year-old male with PMH alcohol dependence, tobacco dependence, psychiatric disorder, and HTN who presented to the emergency department 8/7/2020 with a left lower extremity wound infection. He was hospitalized at our facility in April and evaluated by orthopedic surgery who recommended wound care. Wound cultures at that time grew MSSA and Streptococcus. He had been seen at [facility] earlier that week and prescribed antibiotics, however he had not filled the prescription.  An ultrasound of that extremity was done and was negative for DVT.  He was treated for sepsis and completed an antibiotic course on 9/16/2020. He was also found to have head lice and underwent treatment for that.  A repeat wound culture was done on 9/28/2020 and grew Strep A and Proteus. Infectious disease was consulted and recommended a 5-day course of IV zosyn which was completed on 10/5/2020. On 10/12/2020 the wound care team noticed increased inflammation to the proximal part of the wound bed and switched from a vera flow wound VAC to a regular wound VAC.  ID was again consulted and on 10/14/2020 recommended to 7-day course of antibiotics with meropenem which was completed on 10/22/2020. Additionally, he was noted to have intermittent agitation so was started on risperdal, depakote, and gabapentin per psychiatric recommendations.  On 11/20/2020 he underwent left lower extremity debridement with wound VAC placement. Since then he has remained stable.  He has been deemed incapacitated to make medical decisions and guardianship was granted on 1/29/21. Placement will likely be in a group home.     Interval Problem Update  3/25: VSS and WNL with exception of mild and transient bradycardia at 54 this morning.  No symptoms.  .  No recent labs or diagnostics.  LPS note reviewed.  NPWT to  "continue with dressing changes 3 times weekly.  Psychiatric disorder with encephalopathy.  Lacks capacity.  Public guardian.  Awaits placement in a group home possibly.    3/21: Patient ambulatory in room, pleasant and cooperative.  He is oriented only to himself and situation.  He agrees his pain is well managed on current regimen.  He denies any other problems.  Wound care team reports wound odor and \"failing to contract.\"   -CBC with differential not indicative of infection  -Repeat wound culture not growing organisms, continue to follow  -Pain well managed on current regimen  -Continue guardianship process    Consultants/Specialty  -LPS  -Psychiatry  -Infectious Disease    Code Status  Full Code    Disposition  guardianship granted 1/29/21. SW/CM may attempt to start working on placement.  Obtaining financials.    Review of Systems  Review of Systems   Constitutional: Negative for chills, fever and malaise/fatigue.   HENT: Negative for congestion and sore throat.    Respiratory: Negative for cough and shortness of breath.    Cardiovascular: Negative for chest pain.   Gastrointestinal: Negative for abdominal pain, diarrhea, nausea and vomiting.   Genitourinary: Negative for dysuria, frequency and urgency.   Musculoskeletal: Positive for joint pain and myalgias.   Skin: Negative for itching and rash.   Neurological: Negative for dizziness and headaches.        Physical Exam  Temp:  [36.3 °C (97.3 °F)-36.8 °C (98.2 °F)] 36.4 °C (97.6 °F)  Pulse:  [54-81] 54  Resp:  [16-18] 18  BP: (103-131)/(61-96) 131/96  SpO2:  [92 %-94 %] 92 %    Physical Exam  Vitals and nursing note reviewed.   HENT:      Head: Normocephalic and atraumatic.      Nose: Nose normal.   Eyes:      Conjunctiva/sclera: Conjunctivae normal.      Pupils: Pupils are equal, round, and reactive to light.   Cardiovascular:      Rate and Rhythm: Normal rate and regular rhythm.      Heart sounds: No murmur. No friction rub.   Pulmonary:      Effort: No " "respiratory distress.   Abdominal:      General: Bowel sounds are normal. There is no distension.      Palpations: Abdomen is soft.   Musculoskeletal:      Cervical back: Neck supple.      Comments: Wound VAC tubing and canister intact; images of left lower extremity wound shows improvement   Skin:     General: Skin is warm and dry.   Neurological:      Mental Status: He is alert.     All systems reviewed and exam is unchanged from prior exam.    Fluids    Intake/Output Summary (Last 24 hours) at 3/25/2021 0919  Last data filed at 3/24/2021 2000  Gross per 24 hour   Intake 300 ml   Output no documentation   Net 300 ml       Laboratory                        Imaging  None recent    Assessment/Plan  * Wound of left leg- (present on admission)  Assessment & Plan  -Wound vac - he intermittently dislodges. Requires ongoing education  -Further management per wound care/LPS/ortho.   -Pain control. Long acting pain medications effective. continue as needed at lower dose.  -Improving slowly overall, however most recent wound care note states it is \"failing to contract\" and \"odor\"  - repeat wound culture not growing organisms, follow  -CBC not indicative of infection  Stable    Encephalopathy- (present on admission)  Assessment & Plan  -Improved   -Per psychiatry and Dr. Velázquez on 1/11: Patient is incapacitated.  -Public Guardian: Coretta   -Attempting placement; possibly GH    Psychiatric disorder- (present on admission)  Assessment & Plan  -Unspecified.  -Continue Risperdal and Depakote.  Stable on current treatment.  -Psychiatry has consulted and deemed him incapacitated to leave AMA or make medical decisions.  -Initial cognitive evaluation 8/20.   -SLP repeated Cognitive eval 1/2021- continue to recommend 24/7 supervision   -Public Guardian: Coretta Jenkins   -Pending GH; obtaining financial status  Stable    Essential hypertension- (present on admission)  Assessment & Plan  -Well controlled on " amlodipine    Emphysema/COPD (HCC)- (present on admission)  Assessment & Plan  -Chronic and stable off medication, without acute exacerbation    Protein malnutrition (HCC)- (present on admission)  Assessment & Plan  -Body mass index is 21.35 kg/m².   -Continue TID supplements.  -Encourage PO intake  Improving    Flexion contracture of knee, 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)  Assessment & Plan  -Secondary to chronic wound in that area.  -PT/OT.  -Does not seem to limit his mobility. Is frequently ambulatory.  -Continue PRN flexeril . Robaxin added 3/14   Encourage ambulation    Infection of wound due to methicillin resistant Staphylococcus aureus (MRSA)- (present on admission)  Assessment & Plan  -History of MRSA.  -Poor compliance with OP wound care. Poor compliance with wound vac at times.   -Continue pain control as needed.  -LPS and wound care following - debridements and wound VAC changes per their recommendations  -Cultures repeated 12/14 - positive for Proteus species, pan sensitive.  Completed regimen of augmentin.   -3/18 wound care team reports odor, repeat wound culture so far negative  Likely resolved       VTE prophylaxis: Ambulatory       CARLTON Leal.P.SABINE.      I have performed a physical exam and reviewed and updated ROS and Plan today (3/25/2021). In review of previous note,  there are no changes except as documented above.     I certify that the patient requires continued medically necessary hospital services for the treatment of psychiatric disorder with chronic encephalopathy requiring 24/7 supervision.. The patient will remain in the hospital for the foreseeable future.  Discharge may or may not occur in the next 20 days due to ongoing discharge delays due to no medically acceptable discharge option.

## 2021-03-26 PROCEDURE — 700101 HCHG RX REV CODE 250: Performed by: NURSE PRACTITIONER

## 2021-03-26 PROCEDURE — A9270 NON-COVERED ITEM OR SERVICE: HCPCS | Performed by: NURSE PRACTITIONER

## 2021-03-26 PROCEDURE — 700102 HCHG RX REV CODE 250 W/ 637 OVERRIDE(OP): Performed by: NURSE PRACTITIONER

## 2021-03-26 PROCEDURE — 97598 DBRDMT OPN WND ADDL 20CM/<: CPT

## 2021-03-26 PROCEDURE — 770001 HCHG ROOM/CARE - MED/SURG/GYN PRIV*

## 2021-03-26 PROCEDURE — 97597 DBRDMT OPN WND 1ST 20 CM/<: CPT

## 2021-03-26 PROCEDURE — 700111 HCHG RX REV CODE 636 W/ 250 OVERRIDE (IP): Performed by: NURSE PRACTITIONER

## 2021-03-26 RX ADMIN — GABAPENTIN 300 MG: 300 CAPSULE ORAL at 11:21

## 2021-03-26 RX ADMIN — DIVALPROEX SODIUM 125 MG: 125 CAPSULE ORAL at 14:05

## 2021-03-26 RX ADMIN — DIVALPROEX SODIUM 125 MG: 125 CAPSULE ORAL at 21:21

## 2021-03-26 RX ADMIN — LIDOCAINE 1 PATCH: 50 PATCH CUTANEOUS at 11:21

## 2021-03-26 RX ADMIN — GABAPENTIN 300 MG: 300 CAPSULE ORAL at 04:20

## 2021-03-26 RX ADMIN — DIVALPROEX SODIUM 125 MG: 125 CAPSULE ORAL at 04:20

## 2021-03-26 RX ADMIN — METHOCARBAMOL TABLETS 500 MG: 500 TABLET, COATED ORAL at 11:21

## 2021-03-26 RX ADMIN — MORPHINE SULFATE 15 MG: 15 TABLET, FILM COATED, EXTENDED RELEASE ORAL at 04:20

## 2021-03-26 RX ADMIN — OXYCODONE 5 MG: 5 TABLET ORAL at 16:20

## 2021-03-26 RX ADMIN — METHOCARBAMOL TABLETS 500 MG: 500 TABLET, COATED ORAL at 21:21

## 2021-03-26 RX ADMIN — RISPERIDONE 0.5 MG: 0.5 TABLET ORAL at 04:20

## 2021-03-26 RX ADMIN — ENOXAPARIN SODIUM 40 MG: 40 INJECTION SUBCUTANEOUS at 04:20

## 2021-03-26 RX ADMIN — AMLODIPINE BESYLATE 5 MG: 5 TABLET ORAL at 04:20

## 2021-03-26 RX ADMIN — METHOCARBAMOL TABLETS 500 MG: 500 TABLET, COATED ORAL at 04:20

## 2021-03-26 RX ADMIN — RISPERIDONE 1 MG: 1 TABLET ORAL at 17:33

## 2021-03-26 RX ADMIN — METHOCARBAMOL TABLETS 500 MG: 500 TABLET, COATED ORAL at 17:33

## 2021-03-26 RX ADMIN — MORPHINE SULFATE 15 MG: 15 TABLET, FILM COATED, EXTENDED RELEASE ORAL at 17:33

## 2021-03-26 RX ADMIN — GABAPENTIN 300 MG: 300 CAPSULE ORAL at 17:33

## 2021-03-26 ASSESSMENT — PAIN DESCRIPTION - PAIN TYPE
TYPE: SURGICAL PAIN
TYPE: SURGICAL PAIN
TYPE: ACUTE PAIN
TYPE: ACUTE PAIN;SURGICAL PAIN
TYPE: SURGICAL PAIN

## 2021-03-26 NOTE — PROGRESS NOTES
Mobility Progress Note    Surgery patient: Yes  Date of surgery: 11/19/2020  Ambulated 50 ft on day of surgery? (N/A if patient did not undergo surgery today): n/a  Number of times ambulated 50 feet or greater today: Greater than 5  Patient has been up to chair, edge of bed or HOB 90 degrees for all meals?: Yes3  Goal met? (goal is ambulating at least 50 feet 2 times on day shift, one time on night shift): Yes  If patient did not meet mobility goal, why?: n/a

## 2021-03-26 NOTE — CARE PLAN
Problem: Safety  Goal: Will remain free from injury  Outcome: PROGRESSING AS EXPECTED  Note: Bed is locked and in lowest position, personal belongings are within reach, room is free of clutter and spills, call light is in place. Patient educated on how to use the call light and how to call for assistance. Patient verbalized understanding.       Problem: Mobility  Goal: Risk for activity intolerance will decrease  Outcome: PROGRESSING AS EXPECTED  Note: Mobility Progress Note    Surgery patient: no  Date of surgery: n/a  Ambulated 50 ft on day of surgery? (N/A if patient did not undergo surgery today): n/a  Number of times ambulated 50 feet or greater today: 3  Patient has been up to chair, edge of bed or HOB 90 degrees for all meals?: yes  Goal met? (goal is ambulating at least 50 feet 2 times on day shift, one time on night shift): yes  If patient did not meet mobility goal, why?:

## 2021-03-26 NOTE — WOUND TEAM
[facility]  Inpatient Services  Wound and Skin Care Progress Note    Admission Date: 8/7/2020     Last order of IP CONSULT TO WOUND CARE was found on 9/1/2020 from Hospital Encounter on 8/7/2020     HPI, PMH, SH: Reviewed    Unit where seen by Wound Team: [id number]    WOUND CONSULT/FOLLOW UP RELATED TO:  Left leg scheduled negative pressure wound therapy (npwt) dressing change and 2 layer compression wrap change    OBJECTIVE: NPWT dressing and 2 layer compression wrap in place. Patient on pressure redistribution mattress, up self, ambulates frequently, turns self.    WOUND TYPE, LOCATION, CHARACTERISTICS (Pressure Injuries: location, stage, POA or date identified)       Negative Pressure Wound Therapy 11/20/20 Leg Lateral;Posterior;Medial Left (Active)   Vacuum Serial Number [id number] 03/01/21 1000   NPWT Pump Mode / Pressure Setting Intermittent;150 mmHg 03/26/21 1400   Dressing Type Black Foam (Regular) 03/26/21 1400   Number of Foam Pieces Used 3 03/26/21 1400   Canister Changed No 03/26/21 1400   Output (mL) 200 mL 03/16/21 0800   NEXT Dressing Change/Treatment Date 03/26/21 03/24/21 1500   WOUND NURSE ONLY - Time Spent with Patient (mins) 90 03/26/21 1400           Wound 11/19/20 Full Thickness Wound Leg Lateral;Posterior;Medial Left (Active)   Wound Image    03/24/21 1500   Site Assessment Pink;Red;Yellow 03/26/21 1400   Periwound Assessment Scar tissue;Fragile 03/26/21 1400   Margins Attached edges 03/26/21 1400   Closure Secondary intention 03/26/21 1400   Drainage Amount Moderate 03/26/21 1400   Drainage Description Serosanguineous 03/26/21 1400   Treatments Cleansed;CSWD - Conservative Sharp Wound Debridement 03/26/21 1400   Wound Cleansing Approved Wound Cleanser 03/26/21 1400   Periwound Protectant Skin Protectant Wipes to Periwound;Hydrocolloid 03/26/21 1400   Dressing Cleansing/Solutions Not Applicable 03/24/21 1500   Dressing Options Collagen Dressing;Wound Vac;Compression Wrap Two Layer  03/26/21 1400   Dressing Changed Changed 03/26/21 1400   Dressing Status Clean;Dry;Intact 03/26/21 1400   Dressing Change/Treatment Frequency Monday, Wednesday, Friday, and As Needed 03/26/21 1400   NEXT Dressing Change/Treatment Date 03/29/21 03/26/21 1400   NEXT Weekly Photo (Inpatient Only) 03/31/21 03/24/21 1500   Non-staged Wound Description Full thickness 03/26/21 1400   Wound Length (cm) 12.6 cm 02/22/21 0830   Wound Width (cm) 3 cm 02/22/21 0830   Wound Depth (cm) 0.3 cm 03/24/21 1500   Wound Surface Area (cm^2) 37.8 cm^2 02/22/21 0830   Wound Volume (cm^3) 11.34 cm^3 02/22/21 0830   Post-Procedure Length (cm) 15 cm 02/03/21 1100   Post-Procedure Width (cm) 3.4 cm 02/03/21 1100   Post-Procedure Depth (cm) 0.2 cm 02/03/21 1100   Post-Procedure Surface Area (cm^2) 51 cm^2 02/03/21 1100   Post-Procedure Volume (cm^3) 10.2 cm^3 02/03/21 1100   Wound Healing % -11 02/22/21 0830   Wound Bed Granulation (%) 100 % 03/24/21 1500   Wound Bed Slough (%) 10 % 03/17/21 1500   Wound Bed Granulation (%) - Post-Procedure 100 % 03/24/21 1500   Tunneling (cm) 0 cm 03/26/21 1400   Undermining (cm) 0 cm 03/26/21 1400   Shape two rectangules  03/22/21 1400   Wound Odor None 03/26/21 1400   Pulses Left;2+;DP;PT 03/15/21 1600   Exposed Structures None 03/26/21 1400   WOUND NURSE ONLY - Time Spent with Patient (mins) 90 03/26/21 1400       Lab Values:    Lab Results   Component Value Date/Time    WBC 5.9 03/18/2021 11:09 AM    RBC 4.19 (L) 03/18/2021 11:09 AM    HEMOGLOBIN 11.2 (L) 03/18/2021 11:09 AM    HEMATOCRIT 34.2 (L) 03/18/2021 11:09 AM    CREACTPROT 1.07 (H) 08/12/2020 01:55 PM    SEDRATEWES 85 (H) 08/07/2020 01:20 PM    HBA1C 5.7 (H) 11/09/2018 06:30 PM      Culture Results show:  Recent Results (from the past 720 hour(s))   CULTURE WOUND W/ GRAM STAIN    Collection Time: 09/01/20  2:00 PM    Specimen: Left Leg; Wound   Result Value Ref Range    Significant Indicator POS (POS)     Source WND     Site LEFT LEG     Culture  Result - (A)     Gram Stain Result       Moderate Gram positive cocci.  Moderate Gram positive rods.      Culture Result (A)      Beta Hemolytic Streptococcus group A  Moderate growth      Culture Result (A)      Methicillin Resistant Staphylococcus aureus  Moderate growth      Culture Result (A)      Diphtheroids  Moderate growth  Mixed morphologies.     KOSTAS: 9/20/21   Left.    Doppler waveforms of the common femoral artery are of high amplitude and    triphasic.    Doppler waveforms at the ankle are brisk and triphasic.    Ankle-brachial index is normal.    Unable to obtained popliteal waveforms due to wound bandages.     Self Report / Pain Level:  Pt reported pain with dressing removal and occasionally during cleansing and debridement, but denied pain for the rest of the procedure.     INTERVENTIONS BY WOUND TEAM: INTERVENTIONS BY WOUND TEAM:  Performed standard wound care which includes appropriate positioning, dressing removal and non-selective debridement. Pictures and measurements obtained weekly if/when required.  Cleansed: Wound cleanser and gauze used to clean wound beds and periwound  Debridement: CSWD using curette to remove ~25cm2 nonviable material from LLE wound beds.   Periwound - Skin protectant swabs and hydrocolloid used around wound perimeter and to form bridge  Primary - Vanessa to wound beds, 2 piece of black foam half thickness cut to fit and placed to each wound bed and 1 black piece used to bridge- all secured with drape, restarted NPWT 150mmHg, Intermittent 3/3 no leaks noted.   Secondary - 2 layer compression wrap foot/leg/thigh     Interdisciplinary consultation: Patient, Bedside RN    EVALUATION / RATIONALE FOR TREATMENT:    3/26/21: Foul odor present at dressing removal, but significantly decreased after cleansing.  Continue current POC.  3/24/21 Wound bed red, with less drainage noted.  Strong, foul odor still present.  APRN to see pt regarding wound bed. Continue with current plan of  care.   3/22/21: Area unchanged from previous dressing change.  Wound malodorous.  Meredith-skin dry and flaky at superior wound border, hydocolloid thin dressing placed.   3/13/21 Wounds failing to contract.  Will trial intermittent wound vac setting to encourage greater stimulation of cellular activity in wound bed.    3/10/2021: debrided wound, odor present, continue NPWT  3/6/12 area unchanged, odor persists  3/1/21: Lidocaine unavailable during this dressing change. Small amount of debridement completed with curette. Continued with silver powder for its antimicrobial properties.   2/27/21 skin bridge enlarging  2/24/2021: Excisional debridement by LPS APRN performed for rolled edges that were starting to form along the posterior and medial left leg wound. Moderate bleeding managed by administration of lidocaine injection. CSWD performed for slough. Wound bed is beefy red following debridement. Resume vanessa and silver foam to manage bioburden. Continue with CSWD with each dressing change.   2/22/2021: Rolled edges starting to form along posterior thigh wound bed - may require excisional debridement by provider. Minimal changes to actual wound bed. Continue CSWD, vanessa, and silver foam to manage bioburden.   2/19/21: minimal to no changes from last assessment. Continue CSWD, vanessa, and silver foam for bioburden.  2/17/2021: Periwound remains intact. Adherent slough noted over wound bed. Scant bleeding noted on medial wound following CSWD. Continue with Vanessa over wound beds to further stimulate epithelization. Continue with silver foam to manage bioburden.   2/12/2021:periwound intact does not appear macerated.  Moderate amount of drainage. Wound bed debrided.   Picture frame draped over periwound, three silver foams used.  Continue NPWT and two layer compression.    2/10/21: Periwound looks less denuded today. Did not place the arglaes powder to wound bed - switching to Vanessa to see if it will stimulate healing  in his wound bed. Did not use hydrofiber to periwound but it is in the room for next visit in case it is needed. Ordered and used liquid lidocaine to remove vac dressing and lidocaine gel to the posterior proximal part of wound. Patient tolerated debridement well with lidocaine.   02/05/21: Addison wound is denuded and wet, vac drape lifting under wrap, Aquacel to dry wet and denuded skin, no silver foam available this dressing change.  02/03/21: Measurements are smaller for both medial and posterior thigh.  Wound bed has granulation and non-viable slough.  Will continue to debride every time with wound VAC changes to decrease bioburden in wound bed.   02/01/21:  No change.  Wounds appear to be improving slowly.      01/29/2021: wounds flush with periwound, fully granulated with scant yellow to a few areas. Medial periwound intact, lateral periwound with some erythema. LPS APRN suggested that next time applying brava strips to any irritated periwound may help reduce irritation.   1/27/21: Restarted NPWT, addison-wound looks better.   1/25/21: denuded tissue, vac vacation for until 1/27.  Re-assessment to place wound VAC to left LE.   1/21/21 area has decreased since last measurements.  Odor still present.  Addison skin compromised probably yeast infection under VAC drape.  Will hold VAC for 72 hours and re assess.  Nystatin to address yeast and dry out addison skin  1/15/21: position of leg may affect posterior thigh measurements.  Medial Leg wound area decreased.    01/10/2021: Wound vac replaced, patient accidentally removed vac and bedside RN was unable to repair.  Patient tolerated wound VAC placement. Patient had moderate ss with some green tinge drainage.   01/04/2021: 2 layer compression wrap re-applied to left LE due to ACE wrap leaving too much indentation to left LE.   12/31/2020 thigh wound much narrower.  Biofilm redeveloping and odor still present.   12/27/20: Wound bed granular and odor has lessen but still  persists.   12/18/20 wound length decreased.  Odor persists.    12/14/2020: re-cultured wound bed.  12/11/2020 POD 23 Length of wounds has decreased, width has not changed.  Odor persists.  Pt continues to become angry with VAC when it limits his mobility.    12/7/2020 POD 19 odor persists, improved wound bed after debridement.  Possible bacterial vs yeast vs fungal infection.  Culture taken.  Nystatin to treat possible fungal infection, silver foam to decrease microbial population.    12/4/2020 POD# 16 odor worsening, more yellow tissue present, will add nystatin and silver foam next week and consider a culture  11/30 POD#11 granular buds visible to wound bed.  Same as prior.   11/27 POD#8 wound is odorous likely related to biologic dressing.  No overt signs of infection.  Granular buds are visible through adaptic.  Edges do not appear as thick as they were prior to last sx.  Continue with current treatment.    11/23: POD#4 s/p I&D of left LE wounds and biologic placed by Dr. Olvera on 11/19.  Wound bed is flatter and raised edges scar tissue seems to be gone.   11/14 Posterior thigh aspect of wound deteriorating, will increase frequency of treatment to keep biofilm down and hopefully avoid systemic abx.  Will include thigh in compression wrap.  Will consider culture if improvement is not seen in the next few treatments.    11/10: APRN unavailable at this time.  Will re-schedule excisional debridement to next week.   11/5: Plan for debridement of scarred edges on Tues by LPS. Tendon exposed to posterior aspect of wound, behind knee. Continue with current dressing care.  10/29: Excisional debridement by Asaf ELLER of scar tissue along the proximal edge of the posterior knee and lateral thigh.  Lateral aspect of thigh is flatten.  Medial aspect of knee has thick scar tissue that was excisionally debrided by Asaf ELLER.  Fully granulated throughout the wound bed.   10/23 wound bed mostly granular,  superficial in depth, progress has been slow with the wound VAC so will trial collagen product to encourage new tissue growth.    10/19: wound bed has improved in color and is fully granulated.  Addison-wound has improved, denudation has resolved. APRN continuing with serial weekly debridements as needed.   10/16: wound friable pt now on iv abx per ID.  Indurated edges addressed with drape and foam.  Addison wound likely has yeast infection - addressing with silver powder crusting  10/14: patient seen with Asaf ELLER, stopping veraflo instillation.  Starting silver powder and regular wound VAC to see if it will help with bioburden.  Possible colonization of bacteria.  ID involved.   10/12: Inflammation noted to the proximal part of the wound bed.  Will continue to monitor, possible vac break on Wednesday to help addison-skin inflammation.   10/7: Excisional debridement performed by Asaf ELLER. Will need to continue selective debridement with NPWT changes, and weekly excisional debridement with APRN to flatten out the scar tissue.  IV abx therapy ended 10/5.   10/5: Lateral wound still with some slough, both wounds smaller per measurements. Medial wound with all granular tissue.  9/30: Patient to start abx therapy for 7 days course per Dr. Ellis.  Started dakin's instillation to veraflo  9/28: malodorous today, culture taken.    9/25: Odor noted, though unclear if from wound or pt, consider shower before next Vac change. Consider regular vac next change, wound granular and superficial.   9/23: Patient still awaiting guardianship for placement.   9/18: wound granulating nicely and responding well to current treatment.    9/16: Wound bed with granular tissue, edges are thick but appear better than previous assessments. . NPWT VF to encourage closure by secondary intention and manage drainage while encouraging oxygenation and granulation to the wound bed. 2 layer compression to assist in decrease of swelling  and encourage blood flow to wounds. Wound team to follow up and change 3x/week.      Goals: Steady decrease in wound area and depth weekly.    NURSING PLAN OF CARE ORDERS (X):    Dressing changes: See Dressing Care orders: X  Skin care: See Skin Care orders:   Rectal tube care: See Rectal Tube Care orders:   Other orders:      WOUND TEAM PLAN OF CARE:   Dressing changes by wound team:        Follow up 3 times weekly:                NPWT change 3 times weekly:  X,  NPWT changes 3x/ weekly with 2 layer compression wrap.  Follow up 1-2 times weekly:      Follow up Bi-Monthly:                   Follow up as needed:       Other (explain):     Anticipated discharge plans:  LTACH:        SNF/Rehab: X - patient will need ongoing wound care for LLE upon discharge - 3x/weekly           Home Health Care:           Outpatient Wound Center:            Self Care:

## 2021-03-27 PROCEDURE — A9270 NON-COVERED ITEM OR SERVICE: HCPCS | Performed by: NURSE PRACTITIONER

## 2021-03-27 PROCEDURE — 770001 HCHG ROOM/CARE - MED/SURG/GYN PRIV*

## 2021-03-27 PROCEDURE — 700101 HCHG RX REV CODE 250: Performed by: NURSE PRACTITIONER

## 2021-03-27 PROCEDURE — 700102 HCHG RX REV CODE 250 W/ 637 OVERRIDE(OP): Performed by: NURSE PRACTITIONER

## 2021-03-27 PROCEDURE — 700111 HCHG RX REV CODE 636 W/ 250 OVERRIDE (IP): Performed by: NURSE PRACTITIONER

## 2021-03-27 RX ADMIN — MORPHINE SULFATE 15 MG: 15 TABLET, FILM COATED, EXTENDED RELEASE ORAL at 05:54

## 2021-03-27 RX ADMIN — ENOXAPARIN SODIUM 40 MG: 40 INJECTION SUBCUTANEOUS at 05:54

## 2021-03-27 RX ADMIN — DIVALPROEX SODIUM 125 MG: 125 CAPSULE ORAL at 05:54

## 2021-03-27 RX ADMIN — DIVALPROEX SODIUM 125 MG: 125 CAPSULE ORAL at 13:42

## 2021-03-27 RX ADMIN — METHOCARBAMOL TABLETS 500 MG: 500 TABLET, COATED ORAL at 11:53

## 2021-03-27 RX ADMIN — LIDOCAINE 1 PATCH: 50 PATCH CUTANEOUS at 11:53

## 2021-03-27 RX ADMIN — GABAPENTIN 300 MG: 300 CAPSULE ORAL at 05:54

## 2021-03-27 RX ADMIN — MORPHINE SULFATE 15 MG: 15 TABLET, FILM COATED, EXTENDED RELEASE ORAL at 17:24

## 2021-03-27 RX ADMIN — METHOCARBAMOL TABLETS 500 MG: 500 TABLET, COATED ORAL at 20:34

## 2021-03-27 RX ADMIN — RISPERIDONE 0.5 MG: 0.5 TABLET ORAL at 05:54

## 2021-03-27 RX ADMIN — GABAPENTIN 300 MG: 300 CAPSULE ORAL at 17:24

## 2021-03-27 RX ADMIN — CYCLOBENZAPRINE 10 MG: 10 TABLET, FILM COATED ORAL at 22:50

## 2021-03-27 RX ADMIN — OXYCODONE 5 MG: 5 TABLET ORAL at 17:58

## 2021-03-27 RX ADMIN — METHOCARBAMOL TABLETS 500 MG: 500 TABLET, COATED ORAL at 05:54

## 2021-03-27 RX ADMIN — METHOCARBAMOL TABLETS 500 MG: 500 TABLET, COATED ORAL at 17:24

## 2021-03-27 RX ADMIN — GABAPENTIN 300 MG: 300 CAPSULE ORAL at 11:53

## 2021-03-27 RX ADMIN — AMLODIPINE BESYLATE 5 MG: 5 TABLET ORAL at 05:54

## 2021-03-27 RX ADMIN — DIVALPROEX SODIUM 125 MG: 125 CAPSULE ORAL at 20:34

## 2021-03-27 RX ADMIN — RISPERIDONE 1 MG: 1 TABLET ORAL at 17:24

## 2021-03-27 ASSESSMENT — PAIN DESCRIPTION - PAIN TYPE
TYPE: SURGICAL PAIN
TYPE: SURGICAL PAIN

## 2021-03-27 NOTE — CARE PLAN
Problem: Infection  Goal: Will remain free from infection  Outcome: PROGRESSING AS EXPECTED  Note: Standard precautions in place. Cleansed hands before and after patient care to prevent the spread of germs.       Problem: Venous Thromboembolism (VTW)/Deep Vein Thrombosis (DVT) Prevention:  Goal: Patient will participate in Venous Thrombosis (VTE)/Deep Vein Thrombosis (DVT)Prevention Measures  Outcome: PROGRESSING AS EXPECTED  Flowsheets (Taken 3/26/2021 2219)  Pharmacologic Prophylaxis Used: LMWH: Enoxaparin(Lovenox)  Note: Patient ambulates halls frequently

## 2021-03-27 NOTE — PROGRESS NOTES
Mobility Progress Note    Surgery patient: Yes  Date of surgery: 11/19/2020  Ambulated 50 ft on day of surgery? (N/A if patient did not undergo surgery today): n/a  Number of times ambulated 50 feet or greater today: More than 3  Patient has been up to chair, edge of bed or HOB 90 degrees for all meals?: yes  Goal met? (goal is ambulating at least 50 feet 2 times on day shift, one time on night shift): yes  If patient did not meet mobility goal, why?: n/a

## 2021-03-27 NOTE — CARE PLAN
Problem: Safety  Goal: Will remain free from falls  Description: Pt mobilizes frequently independently.   Outcome: PROGRESSING AS EXPECTED   Up independently    Problem: Pain Management  Goal: Pain level will decrease to patient's comfort goal  Outcome: PROGRESSING AS EXPECTED   Pain well controlled with oral medications.    Problem: Discharge Barriers/Planning  Goal: Patient's continuum of care needs will be met  Outcome: PROGRESSING SLOWER THAN EXPECTED   Difficult/complex discharge

## 2021-03-28 PROCEDURE — 99231 SBSQ HOSP IP/OBS SF/LOW 25: CPT | Performed by: NURSE PRACTITIONER

## 2021-03-28 PROCEDURE — 700102 HCHG RX REV CODE 250 W/ 637 OVERRIDE(OP): Performed by: NURSE PRACTITIONER

## 2021-03-28 PROCEDURE — 770001 HCHG ROOM/CARE - MED/SURG/GYN PRIV*

## 2021-03-28 PROCEDURE — A9270 NON-COVERED ITEM OR SERVICE: HCPCS | Performed by: NURSE PRACTITIONER

## 2021-03-28 PROCEDURE — 700111 HCHG RX REV CODE 636 W/ 250 OVERRIDE (IP): Performed by: NURSE PRACTITIONER

## 2021-03-28 PROCEDURE — 700101 HCHG RX REV CODE 250: Performed by: NURSE PRACTITIONER

## 2021-03-28 RX ADMIN — METHOCARBAMOL TABLETS 500 MG: 500 TABLET, COATED ORAL at 21:17

## 2021-03-28 RX ADMIN — OXYCODONE 5 MG: 5 TABLET ORAL at 13:25

## 2021-03-28 RX ADMIN — OXYCODONE 5 MG: 5 TABLET ORAL at 07:23

## 2021-03-28 RX ADMIN — GABAPENTIN 300 MG: 300 CAPSULE ORAL at 11:31

## 2021-03-28 RX ADMIN — MORPHINE SULFATE 15 MG: 15 TABLET, FILM COATED, EXTENDED RELEASE ORAL at 17:49

## 2021-03-28 RX ADMIN — METHOCARBAMOL TABLETS 500 MG: 500 TABLET, COATED ORAL at 17:49

## 2021-03-28 RX ADMIN — MORPHINE SULFATE 15 MG: 15 TABLET, FILM COATED, EXTENDED RELEASE ORAL at 06:02

## 2021-03-28 RX ADMIN — AMLODIPINE BESYLATE 5 MG: 5 TABLET ORAL at 06:02

## 2021-03-28 RX ADMIN — OXYCODONE 5 MG: 5 TABLET ORAL at 00:34

## 2021-03-28 RX ADMIN — METHOCARBAMOL TABLETS 500 MG: 500 TABLET, COATED ORAL at 06:02

## 2021-03-28 RX ADMIN — DIVALPROEX SODIUM 125 MG: 125 CAPSULE ORAL at 21:18

## 2021-03-28 RX ADMIN — RISPERIDONE 0.5 MG: 0.5 TABLET ORAL at 06:02

## 2021-03-28 RX ADMIN — LIDOCAINE 1 PATCH: 50 PATCH CUTANEOUS at 11:31

## 2021-03-28 RX ADMIN — GABAPENTIN 300 MG: 300 CAPSULE ORAL at 17:49

## 2021-03-28 RX ADMIN — DIVALPROEX SODIUM 125 MG: 125 CAPSULE ORAL at 06:02

## 2021-03-28 RX ADMIN — RISPERIDONE 1 MG: 1 TABLET ORAL at 17:49

## 2021-03-28 RX ADMIN — OXYCODONE 5 MG: 5 TABLET ORAL at 23:00

## 2021-03-28 RX ADMIN — DIVALPROEX SODIUM 125 MG: 125 CAPSULE ORAL at 13:25

## 2021-03-28 RX ADMIN — GABAPENTIN 300 MG: 300 CAPSULE ORAL at 06:02

## 2021-03-28 RX ADMIN — ENOXAPARIN SODIUM 40 MG: 40 INJECTION SUBCUTANEOUS at 06:02

## 2021-03-28 RX ADMIN — METHOCARBAMOL TABLETS 500 MG: 500 TABLET, COATED ORAL at 11:31

## 2021-03-28 ASSESSMENT — ENCOUNTER SYMPTOMS
VOMITING: 0
SHORTNESS OF BREATH: 0
MYALGIAS: 1
ABDOMINAL PAIN: 0
NAUSEA: 0
FEVER: 0

## 2021-03-28 ASSESSMENT — PAIN DESCRIPTION - PAIN TYPE
TYPE: SURGICAL PAIN
TYPE: ACUTE PAIN
TYPE: SURGICAL PAIN
TYPE: ACUTE PAIN
TYPE: SURGICAL PAIN
TYPE: ACUTE PAIN

## 2021-03-28 NOTE — WOUND TEAM
[facility]  Inpatient Services  Wound and Skin Care Progress Note    Admission Date: 8/7/2020     Last order of IP CONSULT TO WOUND CARE was found on 9/1/2020 from Hospital Encounter on 8/7/2020     HPI, PMH, SH: Reviewed    Unit where seen by Wound Team: [id number]    WOUND CONSULT/FOLLOW UP RELATED TO:  Left leg follow-up; scheduled npwt dressing change.    Self Report / Pain Level: Pt c/o moderate pain at the proximal posterior thigh; yelling out and raising hand.      OBJECTIVE: NPWT dressing and 2 layer compression wrap in place    WOUND TYPE, LOCATION, CHARACTERISTICS (Pressure Injuries: location, stage, POA or date identified)  Skin Risk/[place]   Sensory Perception: No Impairment  Moisture: Rarely Moist  Activity: Walks Frequently  Mobility: No Limitations  Nutrition: Adequate  Friction and Shear: No Apparent Problem  Total Score: 22  Skin Breakdown Risk: 18-23 Minimal Risk for Skin Breakdown    Sensory Interventions  Bed Types: Pressure Redistribution Mattress (Atmosair)  Skin Preventative Measures: Pillows in Use for Support / Positioning  Skin Preventative Dressing: Mepilex  Moisturizers/Barriers: Moisturizer   PT / OT Involved in Care: Physical Therapy Involved, Occupational Therapy Involved  Activity : Ambulated, Up to bathroom  Patient Turns / Repositioning: Patient Turns Self from Side to Side  Patient is Receiving Nutrition: Oral Intake Adequate  Nutrition Consult Ordered: No, Consult has Already been Placed  Vitamin Therapy in Use: No  Friction Interventions: Draw Sheet / Pad Used for Repositioning                Negative Pressure Wound Therapy 11/20/20 Leg Lateral;Posterior;Medial Left (Active)   NPWT Pump Mode / Pressure Setting [place];Continuous;150 mmHg 01/20/21 1500   Dressing Type Medium;Black Foam (Regular) 01/20/21 1500   Number of Foam Pieces Used 3 01/20/21 1500   Canister Changed No 01/20/21 1500   Output (mL) 350 mL 01/16/21 0310   NEXT Dressing Change/Treatment Date 01/22/21  01/20/21 1500   WOUND NURSE ONLY - Time Spent with Patient (mins) 75 01/18/21 1600           Wound 11/19/20 Full Thickness Wound Leg Lateral;Posterior;Medial Left (Active)   Wound Image    01/20/21 1500   Site Assessment Red;[place] 01/20/21 1500   Periwound Assessment Macerated, denuded, erythema 01/20/21 1500   Margins Defined edges 01/20/21 1500   Closure Secondary intention 01/20/21 1500   Drainage Amount Moderate 01/20/21 1500   Drainage Description Serosanguineous 01/20/21 1500   Treatments Cleansed;Site care;Compression 01/20/21 1500   Wound Cleansing Approved Wound Cleanser 01/20/21 1500   Periwound Protectant Drape;Skin Protectant Wipes to Periwound 01/20/21 1500   Dressing Cleansing/Solutions Not Applicable 01/20/21 1500   Dressing Options Wound Vac 01/20/21 1500   Dressing Changed Changed 01/20/21 1500   Dressing Status Clean;Dry;Intact 01/20/21 1500   Dressing Change/Treatment Frequency By Wound Team Only 01/20/21 1500   NEXT Dressing Change/Treatment Date 01/22/21 01/20/21 1500   NEXT Weekly Photo (Inpatient Only) 01/27/21 01/20/21 1500   Non-staged Wound Description Full thickness 01/20/21 1500   Wound Bed Granulation (%) 90 % 01/15/21 1500   Wound Bed Slough (%) 10 % 01/15/21 1500   Wound Bed Granulation (%) - Post-Procedure 100 % 01/13/21 1100   Shape Irregular 01/20/21 1500   Wound Odor Mild;Foul 01/20/21 1500   Pulses Left;2+;DP;PT 01/20/21 1500   Exposed Structures None 01/20/21 1500   WOUND NURSE ONLY - Time Spent with Patient (mins) 45 01/20/21 1500            Wounds are no longer connected - measured as 2 wounds - posterior thigh and medial leg.        VASCULAR    Lab Values:    Lab Results   Component Value Date/Time    WBC 6.5 11/22/2020 02:44 AM    RBC 3.54 (L) 11/22/2020 02:44 AM    HEMOGLOBIN 9.9 (L) 11/22/2020 02:44 AM    HEMATOCRIT 30.5 (L) 11/22/2020 02:44 AM    CREACTPROT 1.07 (H) 08/12/2020 01:55 PM    SEDRATEWES 85 (H) 08/07/2020 01:20 PM    HBA1C 5.7 (H) 11/09/2018 06:30 PM         Culture Results show:  Recent Results (from the past 720 hour(s))   CULTURE WOUND W/ GRAM STAIN    Collection Time: 09/01/20  2:00 PM    Specimen: Left Leg; Wound   Result Value Ref Range    Significant Indicator POS (POS)     Source WND     Site LEFT LEG     Culture Result - (A)     Gram Stain Result       Moderate Gram positive cocci.  Moderate Gram positive rods.      Culture Result (A)      Beta Hemolytic Streptococcus group A  Moderate growth      Culture Result (A)      Methicillin Resistant Staphylococcus aureus  Moderate growth      Culture Result (A)      Diphtheroids  Moderate growth  Mixed morphologies.     KOSTAS: 9/20/21   Left.    Doppler waveforms of the common femoral artery are of high amplitude and    triphasic.    Doppler waveforms at the ankle are brisk and triphasic.    Ankle-brachial index is normal.    Unable to obtained popliteal waveforms due to wound bandages.     Self Report / Pain Level: Pt c/o moderate pain at the most proximal posterior thigh - in a very localized area; yelling out intermittently and hitting himself in the head with his hand when sponge was removed from this area.  Topical lidocaine was used.  Will order liquid lido for next visit which can be used at the proximal aspect of wound.      INTERVENTIONS BY WOUND TEAM: wound care performed per standard procedures  Cleansed with wound cleanser  Debridement - sharp by Asaf MORENO  Periwound - dusted liberally with nystatin  Primary _ aquacel ag  Secondary - ABD, 2 layer compression wrap foot/leg/thigh    Interdisciplinary consultation: Patient, Bedside RN    EVALUATION / RATIONALE FOR TREATMENT:     1/21/21 area has decreased since last measurements.  Odor still present.  Addison skin compromised probably yeast infection under VAC drape.  Will hold VAC for 72 hours and re assess.  Nystatin to address yeast and dry out addison skin  1/15/21: position of leg may affect posterior thigh measurements.  Medial Leg wound area  decreased.    01/10/2021: Wound vac replaced, patient accidentally removed vac and bedside RN was unable to repair.  Patient tolerated wound VAC placement. Patient had moderate ss with some green tinge drainage.   01/04/2021: 2 layer compression wrap re-applied to left LE due to ACE wrap leaving too much indentation to left LE.   12/31/2020 thigh wound much narrower.  Biofilm redeveloping and odor still present.   12/27/20: Wound bed granular and odor has lessen but still persists.   12/18/20 wound length decreased.  Odor persists.    12/14/2020: re-cultured wound bed.  12/11/2020 POD 23 Length of wounds has decreased, width has not changed.  Odor persists.  Pt continues to become angry with VAC when it limits his mobility.    12/7/2020 POD 19 odor persists, improved wound bed after debridement.  Possible bacterial vs yeast vs fungal infection.  Culture taken.  Nystatin to treat possible fungal infection, silver foam to decrease microbial population.    12/4/2020 POD# 16 odor worsening, more yellow tissue present, will add nystatin and silver foam next week and consider a culture  11/30 POD#11 granular buds visible to wound bed.  Same as prior.   11/27 POD#8 wound is odorous likely related to biologic dressing.  No overt signs of infection.  Granular buds are visible through adaptic.  Edges do not appear as thick as they were prior to last sx.  Continue with current treatment.    11/23: POD#4 s/p I&D of left LE wounds and biologic placed by Dr. Olvera on 11/19.  Wound bed is flatter and raised edges scar tissue seems to be gone.   11/14 Posterior thigh aspect of wound deteriorating, will increase frequency of treatment to keep biofilm down and hopefully avoid systemic abx.  Will include thigh in compression wrap.  Will consider culture if improvement is not seen in the next few treatments.    11/10: APRN unavailable at this time.  Will re-schedule excisional debridement to next week.   11/5: Plan for debridement of  scarred edges on Tues by LPS. Tendon exposed to posterior aspect of wound, behind knee. Continue with current dressing care.  10/29: Excisional debridement by Asaf ELLER of scar tissue along the proximal edge of the posterior knee and lateral thigh.  Lateral aspect of thigh is flatten.  Medial aspect of knee has thick scar tissue that was excisionally debrided by Asaf ELLER.  Fully granulated throughout the wound bed.   10/23 wound bed mostly granular, superficial in depth, progress has been slow with the wound VAC so will trial collagen product to encourage new tissue growth.    10/19: wound bed has improved in color and is fully granulated.  Addison-wound has improved, denudation has resolved. APRN continuing with serial weekly debridements as needed.   10/16: wound friable pt now on iv abx per ID.  Indurated edges addressed with drape and foam.  Addison wound likely has yeast infection - addressing with silver powder crusting  10/14: patient seen with Asaf ELLER, stopping veraflo instillation.  Starting silver powder and regular wound VAC to see if it will help with bioburden.  Possible colonization of bacteria.  ID involved.   10/12: Inflammation noted to the proximal part of the wound bed.  Will continue to monitor, possible vac break on Wednesday to help addison-skin inflammation.   10/7: Excisional debridement performed by Asaf ELLER. Will need to continue selective debridement with NPWT changes, and weekly excisional debridement with APRN to flatten out the scar tissue.  IV abx therapy ended 10/5.   10/5: Lateral wound still with some slough, both wounds smaller per measurements. Medial wound with all granular tissue.  9/30: Patient to start abx therapy for 7 days course per Dr. Ellis.  Started dakin's instillation to veraflo  9/28: malodorous today, culture taken.    9/25: Odor noted, though unclear if from wound or pt, consider shower before next Vac change. Consider regular vac  next change, wound granular and superficial.   9/23: Patient still awaiting guardianship for placement.   9/18: wound granulating nicely and responding well to current treatment.    9/16: Wound bed with granular tissue, edges are thick but appear better than previous assessments. . NPWT VF to encourage closure by secondary intention and manage drainage while encouraging oxygenation and granulation to the wound bed. 2 layer compression to assist in decrease of swelling and encourage blood flow to wounds. Wound team to follow up and change 3x/week.      Goals: Steady decrease in wound area and depth weekly.    NURSING PLAN OF CARE ORDERS (X):    Dressing changes: See Dressing Care orders: X  Skin care: See Skin Care orders:   Rectal tube care: See Rectal Tube Care orders:   Other orders:      WOUND TEAM PLAN OF CARE:   Dressing changes by wound team:        Follow up 3 times weekly:                NPWT change 3 times weekly:  X,  NPWT changes 3x/ weekly with ace wrap.  Follow up 1-2 times weekly:      Follow up Bi-Monthly:                   Follow up as needed:       Other (explain):     Anticipated discharge plans:  LTACH:        SNF/Rehab: X - patient will need ongoing wound care for LLE upon discharge - 3x/weekly           Home Health Care:           Outpatient Wound Center:            Self Care:               <-------- Click here to INCLUDE CoVID-19 Discharge Instructions

## 2021-03-28 NOTE — PROGRESS NOTES
Received shift report from Arabella TOBAR and assumed care of this pt at 0710. Pt AOx4. Pt reports pain is : pt did not give any number. Pt is up independent and call appropriately. Bed alarm is : NOT ON. No PIV present Pt is on RA with SaO2 >90%. Pt states priority this shift is watching TV. Discussed POC for activity, day time routine, comfort, and safety. Patient has call light and personal belongings within reach. Safety and fall precautions in place. Reviewed orders, notes, labs, and test results. Hourly rounding in place with RN rounding on odd hours and CNA on even hours.

## 2021-03-28 NOTE — CARE PLAN
Problem: Safety  Goal: Will remain free from falls  Description: Pt mobilizes frequently independently.   Outcome: PROGRESSING AS EXPECTED  Note: Safety precautions in place. Call light within reach. Bed is low and in locked position. Hourly rounding in place.       Problem: Discharge Barriers/Planning  Goal: Patient's continuum of care needs will be met  Outcome: PROGRESSING AS EXPECTED  Intervention: Assess potential discharge barriers on admission and throughout hospital stay  Note: SW assisting with dc planning and coordinating with the guardian on solving financial issues     Problem: Pain Management  Goal: Pain level will decrease to patient's comfort goal  Outcome: PROGRESSING AS EXPECTED  Note: Oxycodone, gabapentin, and Robaxin given per MAR with adequate pain relief per pt.

## 2021-03-28 NOTE — CARE PLAN
Problem: Safety  Goal: Will remain free from injury  Outcome: PROGRESSING AS EXPECTED  Pt has had no falls/injuries this shift.     Problem: Pain Management  Goal: Pain level will decrease to patient's comfort goal  Outcome: PROGRESSING AS EXPECTED   Pt pain controlled with PRN Oxy and heating pads.

## 2021-03-28 NOTE — PROGRESS NOTES
Hospital Medicine Twice Weekly Progress Note    Date of Service  3/14/2021    Chief Complaint  Wound Infection    Hospital Course  Mr. Varela is a 65-year-old male with PMH alcohol dependence, tobacco dependence, psychiatric disorder, and HTN who presented to the emergency department 8/7/2020 with a left lower extremity wound infection. He was hospitalized at our facility in April and evaluated by orthopedic surgery who recommended wound care. Wound cultures at that time grew MSSA and Streptococcus. He had been seen at [facility] earlier that week and prescribed antibiotics, however he had not filled the prescription.  An ultrasound of that extremity was done and was negative for DVT.  He was treated for sepsis and completed an antibiotic course on 9/16/2020. He was also found to have head lice and underwent treatment for that.  A repeat wound culture was done on 9/28/2020 and grew Strep A and Proteus. Infectious disease was consulted and recommended a 5-day course of IV zosyn which was completed on 10/5/2020. On 10/12/2020 the wound care team noticed increased inflammation to the proximal part of the wound bed and switched from a vera flow wound VAC to a regular wound VAC.  ID was again consulted and on 10/14/2020 recommended to 7-day course of antibiotics with meropenem which was completed on 10/22/2020. Additionally, he was noted to have intermittent agitation so was started on risperdal, depakote, and gabapentin per psychiatric recommendations.  On 11/20/2020 he underwent left lower extremity debridement with wound VAC placement. Since then he has remained stable.  He has been deemed incapacitated to make medical decisions and guardianship was granted on 1/29/21. Placement will likely be in a group home.     Interval Problem Update  3/28: VSS and WNL.  No labs or diagnostics.  Awaits financial clarity for ultimate placement with guardianship approval.  No clinical changes    3/25: VSS and WNL with  "exception of mild and transient bradycardia at 54 this morning.  No symptoms.  .  No recent labs or diagnostics.  LPS note reviewed.  NPWT to continue with dressing changes 3 times weekly.  Psychiatric disorder with encephalopathy.  Lacks capacity.  Public guardian.  Awaits placement in a group home possibly.    3/21: Patient ambulatory in room, pleasant and cooperative.  He is oriented only to himself and situation.  He agrees his pain is well managed on current regimen.  He denies any other problems.  Wound care team reports wound odor and \"failing to contract.\"   -CBC with differential not indicative of infection  -Repeat wound culture not growing organisms, continue to follow  -Pain well managed on current regimen  -Continue guardianship process    Consultants/Specialty  -LPS  -Psychiatry  -Infectious Disease    Code Status  Full Code    Disposition  guardianship granted 1/29/21. SW/CM may attempt to start working on placement.  Obtaining financials.    Review of Systems  Review of Systems   Constitutional: Negative for fever.   Respiratory: Negative for shortness of breath.    Gastrointestinal: Negative for abdominal pain, melena, nausea and vomiting.   Genitourinary: Negative for dysuria.   Musculoskeletal: Positive for myalgias.   All other systems reviewed and are negative.       Physical Exam  Temp:  [36.2 °C (97.2 °F)-36.3 °C (97.4 °F)] 36.2 °C (97.2 °F)  Pulse:  [69-72] 72  Resp:  [16-18] 18  BP: (100-106)/(46-73) 106/73  SpO2:  [92 %-96 %] 96 %    Physical Exam  Vitals reviewed.   HENT:      Head: Atraumatic.      Nose: Nose normal.   Pulmonary:      Effort: Pulmonary effort is normal.   Neurological:      Mental Status: He is alert.       Fluids    Intake/Output Summary (Last 24 hours) at 3/28/2021 0851  Last data filed at 3/27/2021 2320  Gross per 24 hour   Intake 720 ml   Output no documentation   Net 720 ml       Laboratory                        Imaging  None recent    Assessment/Plan  * " "Wound of left leg- (present on admission)  Assessment & Plan  -Wound vac - he intermittently dislodges. Requires ongoing education  -Further management per wound care/LPS/ortho.   -Pain control. Long acting pain medications effective. continue as needed at lower dose.  -Improving slowly overall, however most recent wound care note states it is \"failing to contract\" and \"odor\"  - repeat wound culture not growing organisms, follow  -CBC not indicative of infection  Stable    Encephalopathy- (present on admission)  Assessment & Plan  -Improved   -Per psychiatry and Dr. Velázquez on 1/11: Patient is incapacitated.  -Public Guardian: Coretta   -Attempting placement; possibly [facility]    Psychiatric disorder- (present on admission)  Assessment & Plan  -Unspecified.  -Continue Risperdal and Depakote.  Stable on current treatment.  -Psychiatry has consulted and deemed him incapacitated to leave AMA or make medical decisions.  -Initial cognitive evaluation 8/20.   -SLP repeated Cognitive eval 1/2021- continue to recommend 24/7 supervision   -Public Guardian: Coretta   -Pending [facility]; obtaining financial status  Stable    Essential hypertension- (present on admission)  Assessment & Plan  -Well controlled on amlodipine    Emphysema/COPD (HCC)- (present on admission)  Assessment & Plan  -Chronic and stable off medication, without acute exacerbation    Protein malnutrition (HCC)- (present on admission)  Assessment & Plan  -Body mass index is 21.35 kg/m².   -Continue TID supplements.  -Encourage PO intake  Improving    Flexion contracture of knee, 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)  Assessment & Plan  -Secondary to chronic wound in that area.  -PT/OT.  -Does not seem to limit his mobility. Is frequently ambulatory.  -Continue PRN flexeril . Robaxin added 3/14   Encourage ambulation    Infection of wound due to methicillin resistant Staphylococcus aureus (MRSA)- (present on admission)  Assessment & Plan  -History of " MRSA.  -Poor compliance with OP wound care. Poor compliance with wound vac at times.   -Continue pain control as needed.  -LPS and wound care following - debridements and wound VAC changes per their recommendations  -Cultures repeated 12/14 - positive for Proteus species, pan sensitive.  Completed regimen of augmentin.   -3/18 wound care team reports odor, repeat wound culture so far negative  Likely resolved       VTE prophylaxis: Ambulatory       CARLTON Leal.P.N.      I have performed a physical exam and reviewed and updated ROS and Plan today (3/28/2021). In review of previous note,  there are no changes except as documented above.     I certify that the patient requires continued medically necessary hospital services for the treatment of psychiatric disorder with chronic encephalopathy requiring 24/7 supervision.. The patient will remain in the hospital for the foreseeable future.  Discharge may or may not occur in the next 20 days due to ongoing discharge delays due to no medically acceptable discharge option.

## 2021-03-29 PROCEDURE — 700101 HCHG RX REV CODE 250: Performed by: NURSE PRACTITIONER

## 2021-03-29 PROCEDURE — A9270 NON-COVERED ITEM OR SERVICE: HCPCS | Performed by: NURSE PRACTITIONER

## 2021-03-29 PROCEDURE — 700102 HCHG RX REV CODE 250 W/ 637 OVERRIDE(OP): Performed by: NURSE PRACTITIONER

## 2021-03-29 PROCEDURE — 700111 HCHG RX REV CODE 636 W/ 250 OVERRIDE (IP): Performed by: NURSE PRACTITIONER

## 2021-03-29 PROCEDURE — 770001 HCHG ROOM/CARE - MED/SURG/GYN PRIV*

## 2021-03-29 RX ADMIN — MORPHINE SULFATE 15 MG: 15 TABLET, FILM COATED, EXTENDED RELEASE ORAL at 06:33

## 2021-03-29 RX ADMIN — ENOXAPARIN SODIUM 40 MG: 40 INJECTION SUBCUTANEOUS at 06:33

## 2021-03-29 RX ADMIN — RISPERIDONE 1 MG: 1 TABLET ORAL at 17:05

## 2021-03-29 RX ADMIN — GABAPENTIN 300 MG: 300 CAPSULE ORAL at 17:05

## 2021-03-29 RX ADMIN — METHOCARBAMOL TABLETS 500 MG: 500 TABLET, COATED ORAL at 17:05

## 2021-03-29 RX ADMIN — RISPERIDONE 0.5 MG: 0.5 TABLET ORAL at 06:34

## 2021-03-29 RX ADMIN — METHOCARBAMOL TABLETS 500 MG: 500 TABLET, COATED ORAL at 06:33

## 2021-03-29 RX ADMIN — GABAPENTIN 300 MG: 300 CAPSULE ORAL at 06:34

## 2021-03-29 RX ADMIN — MORPHINE SULFATE 15 MG: 15 TABLET, FILM COATED, EXTENDED RELEASE ORAL at 17:05

## 2021-03-29 RX ADMIN — LIDOCAINE 1 PATCH: 50 PATCH CUTANEOUS at 13:09

## 2021-03-29 RX ADMIN — DIVALPROEX SODIUM 125 MG: 125 CAPSULE ORAL at 06:33

## 2021-03-29 RX ADMIN — DIVALPROEX SODIUM 125 MG: 125 CAPSULE ORAL at 13:12

## 2021-03-29 RX ADMIN — METHOCARBAMOL TABLETS 500 MG: 500 TABLET, COATED ORAL at 11:41

## 2021-03-29 RX ADMIN — AMLODIPINE BESYLATE 5 MG: 5 TABLET ORAL at 06:34

## 2021-03-29 RX ADMIN — METHOCARBAMOL TABLETS 500 MG: 500 TABLET, COATED ORAL at 21:45

## 2021-03-29 RX ADMIN — DIVALPROEX SODIUM 125 MG: 125 CAPSULE ORAL at 21:45

## 2021-03-29 RX ADMIN — GABAPENTIN 300 MG: 300 CAPSULE ORAL at 11:41

## 2021-03-29 ASSESSMENT — PAIN DESCRIPTION - PAIN TYPE
TYPE: ACUTE PAIN
TYPE: ACUTE PAIN

## 2021-03-29 NOTE — CARE PLAN
Problem: Safety  Goal: Will remain free from injury  Outcome: PROGRESSING AS EXPECTED     Problem: Infection  Goal: Will remain free from infection  Outcome: PROGRESSING AS EXPECTED     Problem: Venous Thromboembolism (VTW)/Deep Vein Thrombosis (DVT) Prevention:  Goal: Patient will participate in Venous Thrombosis (VTE)/Deep Vein Thrombosis (DVT)Prevention Measures  Outcome: PROGRESSING AS EXPECTED     Problem: Pain Management  Goal: Pain level will decrease to patient's comfort goal  Outcome: PROGRESSING AS EXPECTED

## 2021-03-29 NOTE — CARE PLAN
Problem: Safety  Goal: Will remain free from injury  Outcome: PROGRESSING AS EXPECTED  Bed in lowest and locked position. Call light and belongings in reach. Pt educated to call for assistance.      Problem: Venous Thromboembolism (VTW)/Deep Vein Thrombosis (DVT) Prevention:  Goal: Patient will participate in Venous Thrombosis (VTE)/Deep Vein Thrombosis (DVT)Prevention Measures  Outcome: PROGRESSING AS EXPECTED  Pt ambulates frequently.      Problem: Pain Management  Goal: Pain level will decrease to patient's comfort goal  Outcome: PROGRESSING AS EXPECTED  Pain assessed using verbal and nonverbal scales. Pt educated on pain management.      Problem: Mobility  Goal: Risk for activity intolerance will decrease  Outcome: PROGRESSING AS EXPECTED  Mobility Progress Note    Surgery patient: N/A  Date of surgery: N/A  Ambulated 50 ft on day of surgery? (N/A if patient did not undergo surgery today): N/A  Number of times ambulated 50 feet or greater today: Pt ambulates often while awake  Patient has been up to chair, edge of bed or HOB 90 degrees for all meals?: Unknown  Goal met? (goal is ambulating at least 50 feet 2 times on day shift, one time on night shift): Yes  If patient did not meet mobility goal, why?: Goal was met. Pt ambulates around room and unit frequently.

## 2021-03-29 NOTE — PROGRESS NOTES
Mobility Progress Note    Surgery patient: Yes  Date of surgery: 11/19/20  Ambulated 50 ft on day of surgery? (N/A if patient did not undergo surgery today): NA  Number of times ambulated 50 feet or greater today: Greater than 4  Patient has been up to chair, edge of bed or HOB 90 degrees for all meals?: Yes  Goal met? (goal is ambulating at least 50 feet 2 times on day shift, one time on night shift): Yes  If patient did not meet mobility goal, why?: NA

## 2021-03-30 PROCEDURE — 700102 HCHG RX REV CODE 250 W/ 637 OVERRIDE(OP): Performed by: NURSE PRACTITIONER

## 2021-03-30 PROCEDURE — A9270 NON-COVERED ITEM OR SERVICE: HCPCS | Performed by: NURSE PRACTITIONER

## 2021-03-30 PROCEDURE — 97605 NEG PRS WND THER DME<=50SQCM: CPT

## 2021-03-30 PROCEDURE — 700111 HCHG RX REV CODE 636 W/ 250 OVERRIDE (IP): Performed by: NURSE PRACTITIONER

## 2021-03-30 PROCEDURE — 700101 HCHG RX REV CODE 250: Performed by: NURSE PRACTITIONER

## 2021-03-30 PROCEDURE — 770001 HCHG ROOM/CARE - MED/SURG/GYN PRIV*

## 2021-03-30 RX ADMIN — DIVALPROEX SODIUM 125 MG: 125 CAPSULE ORAL at 05:04

## 2021-03-30 RX ADMIN — METHOCARBAMOL TABLETS 500 MG: 500 TABLET, COATED ORAL at 17:28

## 2021-03-30 RX ADMIN — AMLODIPINE BESYLATE 5 MG: 5 TABLET ORAL at 05:03

## 2021-03-30 RX ADMIN — RISPERIDONE 0.5 MG: 0.5 TABLET ORAL at 05:04

## 2021-03-30 RX ADMIN — ENOXAPARIN SODIUM 40 MG: 40 INJECTION SUBCUTANEOUS at 05:04

## 2021-03-30 RX ADMIN — LIDOCAINE 1 PATCH: 50 PATCH CUTANEOUS at 11:52

## 2021-03-30 RX ADMIN — GABAPENTIN 300 MG: 300 CAPSULE ORAL at 05:04

## 2021-03-30 RX ADMIN — GABAPENTIN 300 MG: 300 CAPSULE ORAL at 17:27

## 2021-03-30 RX ADMIN — MORPHINE SULFATE 15 MG: 15 TABLET, FILM COATED, EXTENDED RELEASE ORAL at 17:27

## 2021-03-30 RX ADMIN — METHOCARBAMOL TABLETS 500 MG: 500 TABLET, COATED ORAL at 11:52

## 2021-03-30 RX ADMIN — RISPERIDONE 1 MG: 1 TABLET ORAL at 17:27

## 2021-03-30 RX ADMIN — METHOCARBAMOL TABLETS 500 MG: 500 TABLET, COATED ORAL at 05:04

## 2021-03-30 RX ADMIN — DIVALPROEX SODIUM 125 MG: 125 CAPSULE ORAL at 13:24

## 2021-03-30 RX ADMIN — GABAPENTIN 300 MG: 300 CAPSULE ORAL at 11:51

## 2021-03-30 RX ADMIN — MORPHINE SULFATE 15 MG: 15 TABLET, FILM COATED, EXTENDED RELEASE ORAL at 05:04

## 2021-03-30 ASSESSMENT — PAIN DESCRIPTION - PAIN TYPE
TYPE: ACUTE PAIN
TYPE: ACUTE PAIN

## 2021-03-30 NOTE — PROGRESS NOTES
Mobility Progress Note    Surgery patient: Yes  Date of surgery: 11/19/20  Ambulated 50 ft on day of surgery? (N/A if patient did not undergo surgery today): NA  Number of times ambulated 50 feet or greater today: Greater than 3   Patient has been up to chair, edge of bed or HOB 90 degrees for all meals?: Yes  Goal met? (goal is ambulating at least 50 feet 2 times on day shift, one time on night shift): Yes  If patient did not meet mobility goal, why?: NA

## 2021-03-30 NOTE — WOUND TEAM
[facility]  Inpatient Services  Wound and Skin Care Progress Note    Admission Date: 8/7/2020     Last order of IP CONSULT TO WOUND CARE was found on 9/1/2020 from Hospital Encounter on 8/7/2020     HPI, PMH, SH: Reviewed    Unit where seen by Wound Team: [id number]    WOUND CONSULT/FOLLOW UP RELATED TO:  Left leg scheduled negative pressure wound therapy (npwt) dressing change and 2 layer compression wrap change    OBJECTIVE: NPWT dressing and 2 layer compression wrap in place. Patient on pressure redistribution mattress, up self, ambulates frequently, turns self.    WOUND TYPE, LOCATION, CHARACTERISTICS (Pressure Injuries: location, stage, POA or date identified)       Negative Pressure Wound Therapy 11/20/20 Leg Lateral;Posterior;Medial Left (Active)   Vacuum Serial Number [id number] 03/01/21 1000   NPWT Pump Mode / Pressure Setting Continuous;150 mmHg 03/30/21 0900   Dressing Type Black Foam (Regular) 03/30/21 0900   Number of Foam Pieces Used 3 03/30/21 0900   Canister Changed No 03/30/21 0900   Output (mL) 455 mL 03/29/21 2120   NEXT Dressing Change/Treatment Date 04/02/21 03/30/21 0900   WOUND NURSE ONLY - Time Spent with Patient (mins) 90 03/26/21 1400        Wound 11/19/20 Full Thickness Wound Leg Lateral;Posterior;Medial Left (Active)   Wound Image    03/30/21 0900   Site Assessment Red;Bleeding;Granulation tissue 03/30/21 0900   Periwound Assessment Scar tissue;Fragile;Pink 03/30/21 0900   Margins Attached edges 03/30/21 0900   Closure Secondary intention 03/30/21 0900   Drainage Amount Small 03/30/21 0900   Drainage Description Serosanguineous 03/30/21 0900   Treatments Cleansed;Site care 03/30/21 0900   Wound Cleansing Approved Wound Cleanser 03/30/21 0900   Periwound Protectant Skin Protectant Wipes to Periwound;Hydrocolloid 03/30/21 0900   Dressing Cleansing/Solutions Not Applicable 03/30/21 0900   Dressing Options Collagen Dressing;Wound Vac 03/30/21 0900   Dressing Changed Changed 03/30/21  0900   Dressing Status Clean;Dry;Intact 03/30/21 0900   Dressing Change/Treatment Frequency Monday, Wednesday, Friday, and As Needed 03/30/21 0900   NEXT Dressing Change/Treatment Date 04/02/21 03/30/21 0900   NEXT Weekly Photo (Inpatient Only) 04/07/21 03/30/21 0900   Non-staged Wound Description Full thickness 03/30/21 0900   Wound Length (cm) 12.6 cm 02/22/21 0830   Wound Width (cm) 3 cm 02/22/21 0830   Wound Depth (cm) 0.3 cm 03/24/21 1500   Wound Surface Area (cm^2) 37.8 cm^2 02/22/21 0830   Wound Volume (cm^3) 11.34 cm^3 02/22/21 0830   Post-Procedure Length (cm) 15 cm 02/03/21 1100   Post-Procedure Width (cm) 3.4 cm 02/03/21 1100   Post-Procedure Depth (cm) 0.2 cm 02/03/21 1100   Post-Procedure Surface Area (cm^2) 51 cm^2 02/03/21 1100   Post-Procedure Volume (cm^3) 10.2 cm^3 02/03/21 1100   Wound Healing % -11 02/22/21 0830   Wound Bed Granulation (%) 100 % 03/30/21 0900   Wound Bed Slough (%) 10 % 03/17/21 1500   Wound Bed Granulation (%) - Post-Procedure 100 % 03/24/21 1500   Tunneling (cm) 0 cm 03/26/21 1400   Undermining (cm) 0 cm 03/26/21 1400   Shape two rectangules  03/22/21 1400   Wound Odor Mild 03/30/21 0900   Pulses Left;2+;DP;PT 03/15/21 1600   Exposed Structures None 03/30/21 0900   WOUND NURSE ONLY - Time Spent with Patient (mins) 60 03/30/21 0900         Lab Values:    Lab Results   Component Value Date/Time    WBC 5.9 03/18/2021 11:09 AM    RBC 4.19 (L) 03/18/2021 11:09 AM    HEMOGLOBIN 11.2 (L) 03/18/2021 11:09 AM    HEMATOCRIT 34.2 (L) 03/18/2021 11:09 AM    CREACTPROT 1.07 (H) 08/12/2020 01:55 PM    SEDRATEWES 85 (H) 08/07/2020 01:20 PM    HBA1C 5.7 (H) 11/09/2018 06:30 PM      Culture Results show:  Recent Results (from the past 720 hour(s))   CULTURE WOUND W/ GRAM STAIN    Collection Time: 09/01/20  2:00 PM    Specimen: Left Leg; Wound   Result Value Ref Range    Significant Indicator POS (POS)     Source WND     Site LEFT LEG     Culture Result - (A)     Gram Stain Result       Moderate  Gram positive cocci.  Moderate Gram positive rods.      Culture Result (A)      Beta Hemolytic Streptococcus group A  Moderate growth      Culture Result (A)      Methicillin Resistant Staphylococcus aureus  Moderate growth      Culture Result (A)      Diphtheroids  Moderate growth  Mixed morphologies.     KOSTAS: 9/20/21   Left.    Doppler waveforms of the common femoral artery are of high amplitude and    triphasic.    Doppler waveforms at the ankle are brisk and triphasic.    Ankle-brachial index is normal.    Unable to obtained popliteal waveforms due to wound bandages.     Self Report / Pain Level:  Pt complained of pain with dressing removal but denied pain for rest of procedure.     INTERVENTIONS BY WOUND TEAM: INTERVENTIONS BY WOUND TEAM:  Performed standard wound care which includes appropriate positioning, dressing removal and non-selective debridement. Pictures and measurements obtained weekly if/when required.  Cleansed: Wound cleanser and gauze used to clean wound beds and periwound  Debridement: NA   Periwound - Skin protectant swabs used on addison-skin and hydrocolloid applied around wound border.   Primary - Vanessa applied to wound beds, 2 piece of black foam half thickness cut to fit and placed over each wound bed and 1 black piece used to bridge.  Drape applied and NPWT restarted.  Secondary - 2 layer compression wrap     Interdisciplinary consultation: Patient, Bedside RN    EVALUATION / RATIONALE FOR TREATMENT:    3/30/21: Wound odor mild-moderate and wound bed bright red, now 100% granulation tissue.  Addison-skin continues to be fragile/flakey; protected with hydrocolloid dressing to wound border.    3/26/21: Foul odor present at dressing removal, but significantly decreased after cleansing.  Continue current POC.  3/24/21 Wound bed red, with less drainage noted.  Strong, foul odor still present.  APRN to see pt regarding wound bed. Continue with current plan of care.   3/22/21: Area unchanged from  previous dressing change.  Wound malodorous.  Meredith-skin dry and flaky at superior wound border, hydocolloid thin dressing placed.   3/13/21 Wounds failing to contract.  Will trial intermittent wound vac setting to encourage greater stimulation of cellular activity in wound bed.    3/10/2021: debrided wound, odor present, continue NPWT  3/6/12 area unchanged, odor persists  3/1/21: Lidocaine unavailable during this dressing change. Small amount of debridement completed with curette. Continued with silver powder for its antimicrobial properties.   2/27/21 skin bridge enlarging  2/24/2021: Excisional debridement by LPS APRN performed for rolled edges that were starting to form along the posterior and medial left leg wound. Moderate bleeding managed by administration of lidocaine injection. CSWD performed for slough. Wound bed is beefy red following debridement. Resume vanessa and silver foam to manage bioburden. Continue with CSWD with each dressing change.   2/22/2021: Rolled edges starting to form along posterior thigh wound bed - may require excisional debridement by provider. Minimal changes to actual wound bed. Continue CSWD, vanessa, and silver foam to manage bioburden.   2/19/21: minimal to no changes from last assessment. Continue CSWD, vanessa, and silver foam for bioburden.  2/17/2021: Periwound remains intact. Adherent slough noted over wound bed. Scant bleeding noted on medial wound following CSWD. Continue with Vanessa over wound beds to further stimulate epithelization. Continue with silver foam to manage bioburden.   2/12/2021:periwound intact does not appear macerated.  Moderate amount of drainage. Wound bed debrided.   Picture frame draped over periwound, three silver foams used.  Continue NPWT and two layer compression.    2/10/21: Periwound looks less denuded today. Did not place the arglaes powder to wound bed - switching to Vanessa to see if it will stimulate healing in his wound bed. Did not use  hydrofiber to periwound but it is in the room for next visit in case it is needed. Ordered and used liquid lidocaine to remove vac dressing and lidocaine gel to the posterior proximal part of wound. Patient tolerated debridement well with lidocaine.   02/05/21: Addison wound is denuded and wet, vac drape lifting under wrap, Aquacel to dry wet and denuded skin, no silver foam available this dressing change.  02/03/21: Measurements are smaller for both medial and posterior thigh.  Wound bed has granulation and non-viable slough.  Will continue to debride every time with wound VAC changes to decrease bioburden in wound bed.   02/01/21:  No change.  Wounds appear to be improving slowly.      01/29/2021: wounds flush with periwound, fully granulated with scant yellow to a few areas. Medial periwound intact, lateral periwound with some erythema. LPS APRN suggested that next time applying brava strips to any irritated periwound may help reduce irritation.   1/27/21: Restarted NPWT, addison-wound looks better.   1/25/21: denuded tissue, vac vacation for until 1/27.  Re-assessment to place wound VAC to left LE.   1/21/21 area has decreased since last measurements.  Odor still present.  Addison skin compromised probably yeast infection under VAC drape.  Will hold VAC for 72 hours and re assess.  Nystatin to address yeast and dry out addison skin  1/15/21: position of leg may affect posterior thigh measurements.  Medial Leg wound area decreased.    01/10/2021: Wound vac replaced, patient accidentally removed vac and bedside RN was unable to repair.  Patient tolerated wound VAC placement. Patient had moderate ss with some green tinge drainage.   01/04/2021: 2 layer compression wrap re-applied to left LE due to ACE wrap leaving too much indentation to left LE.   12/31/2020 thigh wound much narrower.  Biofilm redeveloping and odor still present.   12/27/20: Wound bed granular and odor has lessen but still persists.   12/18/20 wound length  decreased.  Odor persists.    12/14/2020: re-cultured wound bed.  12/11/2020 POD 23 Length of wounds has decreased, width has not changed.  Odor persists.  Pt continues to become angry with VAC when it limits his mobility.    12/7/2020 POD 19 odor persists, improved wound bed after debridement.  Possible bacterial vs yeast vs fungal infection.  Culture taken.  Nystatin to treat possible fungal infection, silver foam to decrease microbial population.    12/4/2020 POD# 16 odor worsening, more yellow tissue present, will add nystatin and silver foam next week and consider a culture  11/30 POD#11 granular buds visible to wound bed.  Same as prior.   11/27 POD#8 wound is odorous likely related to biologic dressing.  No overt signs of infection.  Granular buds are visible through adaptic.  Edges do not appear as thick as they were prior to last sx.  Continue with current treatment.    11/23: POD#4 s/p I&D of left LE wounds and biologic placed by Dr. Olvera on 11/19.  Wound bed is flatter and raised edges scar tissue seems to be gone.   11/14 Posterior thigh aspect of wound deteriorating, will increase frequency of treatment to keep biofilm down and hopefully avoid systemic abx.  Will include thigh in compression wrap.  Will consider culture if improvement is not seen in the next few treatments.    11/10: APRN unavailable at this time.  Will re-schedule excisional debridement to next week.   11/5: Plan for debridement of scarred edges on Tues by LPS. Tendon exposed to posterior aspect of wound, behind knee. Continue with current dressing care.  10/29: Excisional debridement by Asaf ELLER of scar tissue along the proximal edge of the posterior knee and lateral thigh.  Lateral aspect of thigh is flatten.  Medial aspect of knee has thick scar tissue that was excisionally debrided by Asaf ELLER.  Fully granulated throughout the wound bed.   10/23 wound bed mostly granular, superficial in depth, progress has  been slow with the wound VAC so will trial collagen product to encourage new tissue growth.    10/19: wound bed has improved in color and is fully granulated.  Addison-wound has improved, denudation has resolved. APRN continuing with serial weekly debridements as needed.   10/16: wound friable pt now on iv abx per ID.  Indurated edges addressed with drape and foam.  Addison wound likely has yeast infection - addressing with silver powder crusting  10/14: patient seen with Asaf ELLER, stopping veraflo instillation.  Starting silver powder and regular wound VAC to see if it will help with bioburden.  Possible colonization of bacteria.  ID involved.   10/12: Inflammation noted to the proximal part of the wound bed.  Will continue to monitor, possible vac break on Wednesday to help addison-skin inflammation.   10/7: Excisional debridement performed by Asaf ELLER. Will need to continue selective debridement with NPWT changes, and weekly excisional debridement with APRN to flatten out the scar tissue.  IV abx therapy ended 10/5.   10/5: Lateral wound still with some slough, both wounds smaller per measurements. Medial wound with all granular tissue.  9/30: Patient to start abx therapy for 7 days course per Dr. Ellis.  Started dakin's instillation to veraflo  9/28: malodorous today, culture taken.    9/25: Odor noted, though unclear if from wound or pt, consider shower before next Vac change. Consider regular vac next change, wound granular and superficial.   9/23: Patient still awaiting guardianship for placement.   9/18: wound granulating nicely and responding well to current treatment.    9/16: Wound bed with granular tissue, edges are thick but appear better than previous assessments. . NPWT VF to encourage closure by secondary intention and manage drainage while encouraging oxygenation and granulation to the wound bed. 2 layer compression to assist in decrease of swelling and encourage blood flow to wounds.  Wound team to follow up and change 3x/week.      Goals: Steady decrease in wound area and depth weekly.    NURSING PLAN OF CARE ORDERS (X):    Dressing changes: See Dressing Care orders: X  Skin care: See Skin Care orders:   Rectal tube care: See Rectal Tube Care orders:   Other orders:      WOUND TEAM PLAN OF CARE:   Dressing changes by wound team:        Follow up 3 times weekly:                NPWT change 3 times weekly:  X,  NPWT changes 3x/ weekly with 2 layer compression wrap.  Follow up 1-2 times weekly:      Follow up Bi-Monthly:                   Follow up as needed:       Other (explain):     Anticipated discharge plans:  LTACH:        SNF/Rehab: X - patient will need ongoing wound care for LLE upon discharge - 3x/weekly           Home Health Care:           Outpatient Wound Center:            Self Care:

## 2021-03-30 NOTE — CARE PLAN
Problem: Safety  Goal: Will remain free from injury  Outcome: PROGRESSING AS EXPECTED  Bed in lowest and locked position. Call light and belongings in reach.      Problem: Infection  Goal: Will remain free from infection  Outcome: PROGRESSING AS EXPECTED  Wound vac in place on wound. Canister changed.      Problem: Venous Thromboembolism (VTW)/Deep Vein Thrombosis (DVT) Prevention:  Goal: Patient will participate in Venous Thrombosis (VTE)/Deep Vein Thrombosis (DVT)Prevention Measures  Outcome: PROGRESSING AS EXPECTED  Pt ambulates frequently in his room and around unit.      Problem: Knowledge Deficit  Goal: Knowledge of disease process/condition, treatment plan, diagnostic tests, and medications will improve  Outcome: PROGRESSING AS EXPECTED  Pt reoriented as needed.     Problem: Mobility  Goal: Risk for activity intolerance will decrease  Outcome: PROGRESSING AS EXPECTED  Mobility Progress Note    Surgery patient: N/A  Date of surgery: 11/19/2020  Ambulated 50 ft on day of surgery? (N/A if patient did not undergo surgery today): N/A  Number of times ambulated 50 feet or greater today: Greater than 2  Patient has been up to chair, edge of bed or HOB 90 degrees for all meals?: Yes  Goal met? (goal is ambulating at least 50 feet 2 times on day shift, one time on night shift): Unknown, but pt walks around room and unit frequently.   If patient did not meet mobility goal, why?: Goal was met

## 2021-03-31 PROCEDURE — 700111 HCHG RX REV CODE 636 W/ 250 OVERRIDE (IP): Performed by: NURSE PRACTITIONER

## 2021-03-31 PROCEDURE — A9270 NON-COVERED ITEM OR SERVICE: HCPCS | Performed by: NURSE PRACTITIONER

## 2021-03-31 PROCEDURE — 700102 HCHG RX REV CODE 250 W/ 637 OVERRIDE(OP): Performed by: NURSE PRACTITIONER

## 2021-03-31 PROCEDURE — 770001 HCHG ROOM/CARE - MED/SURG/GYN PRIV*

## 2021-03-31 RX ADMIN — METHOCARBAMOL TABLETS 500 MG: 500 TABLET, COATED ORAL at 12:33

## 2021-03-31 RX ADMIN — DIVALPROEX SODIUM 125 MG: 125 CAPSULE ORAL at 22:12

## 2021-03-31 RX ADMIN — METHOCARBAMOL TABLETS 500 MG: 500 TABLET, COATED ORAL at 00:25

## 2021-03-31 RX ADMIN — METHOCARBAMOL TABLETS 500 MG: 500 TABLET, COATED ORAL at 16:46

## 2021-03-31 RX ADMIN — MORPHINE SULFATE 15 MG: 15 TABLET, FILM COATED, EXTENDED RELEASE ORAL at 04:24

## 2021-03-31 RX ADMIN — GABAPENTIN 300 MG: 300 CAPSULE ORAL at 12:33

## 2021-03-31 RX ADMIN — MORPHINE SULFATE 15 MG: 15 TABLET, FILM COATED, EXTENDED RELEASE ORAL at 16:46

## 2021-03-31 RX ADMIN — DIVALPROEX SODIUM 125 MG: 125 CAPSULE ORAL at 00:25

## 2021-03-31 RX ADMIN — OXYCODONE 5 MG: 5 TABLET ORAL at 00:25

## 2021-03-31 RX ADMIN — RISPERIDONE 0.5 MG: 0.5 TABLET ORAL at 04:24

## 2021-03-31 RX ADMIN — OXYCODONE 5 MG: 5 TABLET ORAL at 08:21

## 2021-03-31 RX ADMIN — OXYCODONE 5 MG: 5 TABLET ORAL at 22:30

## 2021-03-31 RX ADMIN — GABAPENTIN 300 MG: 300 CAPSULE ORAL at 04:24

## 2021-03-31 RX ADMIN — METHOCARBAMOL TABLETS 500 MG: 500 TABLET, COATED ORAL at 04:24

## 2021-03-31 RX ADMIN — AMLODIPINE BESYLATE 5 MG: 5 TABLET ORAL at 04:24

## 2021-03-31 RX ADMIN — ENOXAPARIN SODIUM 40 MG: 40 INJECTION SUBCUTANEOUS at 04:24

## 2021-03-31 RX ADMIN — GABAPENTIN 300 MG: 300 CAPSULE ORAL at 16:46

## 2021-03-31 RX ADMIN — METHOCARBAMOL TABLETS 500 MG: 500 TABLET, COATED ORAL at 22:12

## 2021-03-31 RX ADMIN — OXYCODONE 5 MG: 5 TABLET ORAL at 16:46

## 2021-03-31 RX ADMIN — CYCLOBENZAPRINE 10 MG: 10 TABLET, FILM COATED ORAL at 20:09

## 2021-03-31 RX ADMIN — DIVALPROEX SODIUM 125 MG: 125 CAPSULE ORAL at 16:46

## 2021-03-31 RX ADMIN — DIVALPROEX SODIUM 125 MG: 125 CAPSULE ORAL at 08:21

## 2021-03-31 RX ADMIN — RISPERIDONE 1 MG: 1 TABLET ORAL at 16:46

## 2021-03-31 RX ADMIN — CYCLOBENZAPRINE 10 MG: 10 TABLET, FILM COATED ORAL at 09:16

## 2021-03-31 ASSESSMENT — PAIN DESCRIPTION - PAIN TYPE
TYPE: ACUTE PAIN
TYPE: CHRONIC PAIN
TYPE: ACUTE PAIN
TYPE: CHRONIC PAIN
TYPE: ACUTE PAIN
TYPE: CHRONIC PAIN

## 2021-03-31 NOTE — DISCHARGE PLANNING
Anticipated Discharge Disposition: Long term placement-    Action: Discussed in IDT rounds; pt medically cleared awaiting placement by guardian.    Barriers to Discharge:   Guardian access to pt finances pending  Placement     Plan: Care coordination to follow up with guardian weekly for status update.

## 2021-03-31 NOTE — CARE PLAN
Problem: Knowledge Deficit  Goal: Knowledge of disease process/condition, treatment plan, diagnostic tests, and medications will improve  Outcome: PROGRESSING SLOWER THAN EXPECTED   Patient requiring frequent explanation of POC. All questions answered.     Problem: Mobility  Goal: Risk for activity intolerance will decrease  Outcome: PROGRESSING AS EXPECTED  Patient ambulates frequently w/o difficulty.

## 2021-04-01 PROCEDURE — A9270 NON-COVERED ITEM OR SERVICE: HCPCS | Performed by: NURSE PRACTITIONER

## 2021-04-01 PROCEDURE — 700102 HCHG RX REV CODE 250 W/ 637 OVERRIDE(OP): Performed by: NURSE PRACTITIONER

## 2021-04-01 PROCEDURE — 700101 HCHG RX REV CODE 250: Performed by: NURSE PRACTITIONER

## 2021-04-01 PROCEDURE — 700111 HCHG RX REV CODE 636 W/ 250 OVERRIDE (IP): Performed by: NURSE PRACTITIONER

## 2021-04-01 PROCEDURE — 770001 HCHG ROOM/CARE - MED/SURG/GYN PRIV*

## 2021-04-01 PROCEDURE — 99231 SBSQ HOSP IP/OBS SF/LOW 25: CPT | Performed by: NURSE PRACTITIONER

## 2021-04-01 RX ORDER — LIDOCAINE HYDROCHLORIDE 20 MG/ML
30 SOLUTION OROPHARYNGEAL
Status: DISCONTINUED | OUTPATIENT
Start: 2021-04-01 | End: 2021-05-03 | Stop reason: HOSPADM

## 2021-04-01 RX ORDER — AMOXICILLIN 250 MG
2 CAPSULE ORAL 2 TIMES DAILY
Status: DISCONTINUED | OUTPATIENT
Start: 2021-04-01 | End: 2021-05-03 | Stop reason: HOSPADM

## 2021-04-01 RX ADMIN — METHOCARBAMOL TABLETS 500 MG: 500 TABLET, COATED ORAL at 17:10

## 2021-04-01 RX ADMIN — DIVALPROEX SODIUM 125 MG: 125 CAPSULE ORAL at 21:27

## 2021-04-01 RX ADMIN — HYDROXYZINE HYDROCHLORIDE 25 MG: 50 TABLET, FILM COATED ORAL at 21:27

## 2021-04-01 RX ADMIN — METHOCARBAMOL TABLETS 500 MG: 500 TABLET, COATED ORAL at 05:11

## 2021-04-01 RX ADMIN — DOCUSATE SODIUM 50 MG AND SENNOSIDES 8.6 MG 2 TABLET: 8.6; 5 TABLET, FILM COATED ORAL at 17:10

## 2021-04-01 RX ADMIN — METHOCARBAMOL TABLETS 500 MG: 500 TABLET, COATED ORAL at 21:27

## 2021-04-01 RX ADMIN — LIDOCAINE 1 PATCH: 50 PATCH CUTANEOUS at 11:59

## 2021-04-01 RX ADMIN — MORPHINE SULFATE 15 MG: 15 TABLET, FILM COATED, EXTENDED RELEASE ORAL at 05:10

## 2021-04-01 RX ADMIN — OXYCODONE 5 MG: 5 TABLET ORAL at 08:58

## 2021-04-01 RX ADMIN — DIVALPROEX SODIUM 125 MG: 125 CAPSULE ORAL at 05:10

## 2021-04-01 RX ADMIN — GABAPENTIN 300 MG: 300 CAPSULE ORAL at 11:59

## 2021-04-01 RX ADMIN — DIVALPROEX SODIUM 125 MG: 125 CAPSULE ORAL at 13:45

## 2021-04-01 RX ADMIN — METHOCARBAMOL TABLETS 500 MG: 500 TABLET, COATED ORAL at 11:59

## 2021-04-01 RX ADMIN — OXYCODONE 5 MG: 5 TABLET ORAL at 22:42

## 2021-04-01 RX ADMIN — RISPERIDONE 0.5 MG: 0.5 TABLET ORAL at 05:11

## 2021-04-01 RX ADMIN — GABAPENTIN 300 MG: 300 CAPSULE ORAL at 17:10

## 2021-04-01 RX ADMIN — AMLODIPINE BESYLATE 5 MG: 5 TABLET ORAL at 05:11

## 2021-04-01 RX ADMIN — ENOXAPARIN SODIUM 40 MG: 40 INJECTION SUBCUTANEOUS at 05:11

## 2021-04-01 RX ADMIN — GABAPENTIN 300 MG: 300 CAPSULE ORAL at 05:10

## 2021-04-01 RX ADMIN — MORPHINE SULFATE 15 MG: 15 TABLET, FILM COATED, EXTENDED RELEASE ORAL at 17:10

## 2021-04-01 RX ADMIN — RISPERIDONE 1 MG: 1 TABLET ORAL at 17:09

## 2021-04-01 ASSESSMENT — PAIN DESCRIPTION - PAIN TYPE
TYPE: CHRONIC PAIN

## 2021-04-01 NOTE — PROGRESS NOTES
Mobility Progress Note     Surgery patient: Yes  Date of surgery: 11/19/20  Ambulated 50 ft on day of surgery? (N/A if patient did not undergo surgery today): NA  Number of times ambulated 50 feet or greater today: 2  Patient has been up to chair, edge of bed or HOB 90 degrees for all meals?: Yes  Goal met? (goal is ambulating at least 50 feet 2 times on day shift, one time on night shift): Yes  If patient did not meet mobility goal, why?: NA

## 2021-04-01 NOTE — DISCHARGE PLANNING
Anticipated Discharge Disposition:GH placement.    Action: Discussed in difficult D/C round table. [facility] may consider [facility] with GH until financials worked out. Called Guardian, Aletha Grady to discuss. She needs GH order, [employer] messaged Grecia ELLER. Aletha will begin working on GH.    Barriers to Discharge: Pending GH and JAI.    Plan: Follow up with rodríguez next week.

## 2021-04-01 NOTE — WOUND TEAM
RN called wound team - pt pulled off TRAC pad from VAC dressing.  Cut away some of the compression bandage and applied new TRAC pad, achieved seal.  Applied cotton batting and coban to area of compression bandage that was cut away.

## 2021-04-01 NOTE — DISCHARGE PLANNING
"Anticipated Discharge Disposition: GH placement    Action: Received correspondence for Aletha nava yesterday:\" Mr. Gas SSA has been being deposited onto a direct express card.This is problematic because neither us ro [facility], is in possession of card. The process to have the funds redirected from the card to his account through my office can be extremely lengthy and it is unknown at this time if they will reissue any unused funds currently on the card.  At this time his monthly income is $722.00.\"    Barriers to Discharge: Inability access funds for GH    Plan: Continue to work with guardian for placement.  "

## 2021-04-01 NOTE — CARE PLAN
Problem: Safety  Goal: Will remain free from injury  Outcome: PROGRESSING AS EXPECTED  Note: Educated on fall risk and ensured fall precautions in place. Bed in lowest position, treaded socks in place, call light in reach, bed alarm in place, room free of clutter.      Problem: Infection  Goal: Will remain free from infection  Outcome: PROGRESSING AS EXPECTED  Note: Standard precautions in place. Cleansed hands before and after patient care to prevent the spread of germs.

## 2021-04-01 NOTE — PROGRESS NOTES
Pharmacy Pharmacotherapy Consult for LOS >30 days    Admit Date: 8/7/2020      Medications were reviewed for appropriateness and ongoing need.     Current Facility-Administered Medications   Medication Dose Route Frequency Provider Last Rate Last Admin   • methocarbamol (ROBAXIN) tablet 500 mg  500 mg Oral 4X/DAY Gisselle Sinclair, A.P.R.N.   500 mg at 04/01/21 1159   • morphine ER (MS CONTIN) tablet 15 mg  15 mg Oral Q12HRS Nya Gonzalez A.P.R.N.   15 mg at 04/01/21 0510   • oxyCODONE immediate-release (ROXICODONE) tablet 5 mg  5 mg Oral Q6HRS PRN Nya Gonzalez A.P.R.N.   5 mg at 04/01/21 0858   • lidocaine (XYLOCAINE) 2 % injection 20 mL  20 mL Topical QDAY PRN Nya Gonzalez A.P.R.N.   20 mL at 03/13/21 1140    Or   • lidocaine (XYLOCAINE) 4 % topical solution   Topical QDAY PRN Nya Gonzalez A.P.R.N.       • guaiFENesin dextromethorphan (ROBITUSSIN DM) 100-10 MG/5ML syrup 5 mL  5 mL Oral Q6HRS PRN Nya Gonzalez A.P.R.N.   5 mL at 03/12/21 1820   • lidocaine 2 % jelly 1 Application  1 Application Topical QDAY PRN Nya Gonzalez A.P.R.N.       • gabapentin (NEURONTIN) capsule 300 mg  300 mg Oral TID Nya Gonzalez A.P.R.N.   300 mg at 04/01/21 1159   • enoxaparin (LOVENOX) inj 40 mg  40 mg Subcutaneous DAILY Nya Gonzalez A.P.R.N.   Stopped at 04/02/21 0600   • cyclobenzaprine (Flexeril) tablet 10 mg  10 mg Oral TID PRN CARLTON Hull.P.R.N.   10 mg at 03/31/21 2009   • lidocaine (LIDODERM) 5 % 1 Patch  1 Patch Transdermal Q24HR CARLTON Hull.P.R.N.   1 Patch at 04/01/21 1159   • hydrOXYzine HCl (ATARAX) tablet 25 mg  25 mg Oral TID PRN NATALEE HullP.R.N.   25 mg at 03/22/21 1030   • senna-docusate (PERICOLACE or SENOKOT S) 8.6-50 MG per tablet 2 Tab  2 tablet Oral BID PRN CARLTON Hull.P.R.N.   2 tablet at 12/07/20 0632   • risperiDONE (RISPERDAL) tablet 0.5 mg  0.5 mg Oral DAILY NATALEE HullP.R.N.   0.5 mg at 04/01/21 0511   • risperiDONE (RISPERDAL) tablet 1 mg  1 mg Oral Q EVENING  Nya Gonzalez A.P.R.N.   1 mg at 03/31/21 1646   • divalproex (DEPAKOTE SPRINKLE) capsule 125 mg  125 mg Oral Q8HRS Nya Gonzalez A.P.R.N.   125 mg at 04/01/21 0510   • amLODIPine (NORVASC) tablet 5 mg  5 mg Oral Q DAY Nya Gonzalez A.P.R.N.   5 mg at 04/01/21 0511       Recommendations:  1. Recommend discontinuing Robitussin DM- last used (3-12)  2. Senna-Docusate  8.6-50 mg BID PRN- recommend scheduling this medication to BiD d/t patient irregular bowel movements.  3. Lovenox 40mg inj.sq- initially prescribed per DVT prophylaxis protocol, currently patient is ambulating and does not require chemical DVT prophylaxis. Recommend to D/C.    Philip Germain, Pharmacy Intern    Reviewed chart, med administrations, I/os with Philip Pharm Intern. Recommendations communicated to RAFITA Lucero, PharmD, BCPS

## 2021-04-01 NOTE — CARE PLAN
Problem: Discharge Barriers/Planning  Goal: Patient's continuum of care needs will be met  4/1/2021 1407 by Nataly Castillo R.N.  Outcome: PROGRESSING SLOWER THAN EXPECTED  Guardian working on accessing patient's finances to provide appropriate placement.     Problem: Safety  Goal: Will remain free from falls  Description: Pt mobilizes frequently independently.   Outcome: PROGRESSING AS EXPECTED  Pt is instructed to call when in need of assistance   Environment free of clutter. Hourly rounding in place.

## 2021-04-01 NOTE — PROGRESS NOTES
[facility] Medicine TWICE WEEKLY Progress Note    Date of Service  4/1/2021    Chief Complaint  65 y.o. male admitted 8/7/2020 with wound infection    Hospital Course  Mr. Varela is a 65-year-old male with PMH alcohol dependence, tobacco dependence, psychiatric disorder, and HTN who presented to the emergency department 8/7/2020 with a left lower extremity wound infection. He was hospitalized at our facility in April and evaluated by orthopedic surgery who recommended wound care. Wound cultures at that time grew MSSA and Streptococcus. He had been seen at [facility] earlier that week and prescribed antibiotics, however he had not filled the prescription.  An ultrasound of that extremity was done and was negative for DVT.  He was treated for sepsis and completed an antibiotic course on 9/16/2020. He was also found to have head lice and underwent treatment for that.  A repeat wound culture was done on 9/28/2020 and grew Strep A and Proteus. Infectious disease was consulted and recommended a 5-day course of IV zosyn which was completed on 10/5/2020. On 10/12/2020 the wound care team noticed increased inflammation to the proximal part of the wound bed and switched from a vera flow wound VAC to a regular wound VAC.  ID was again consulted and on 10/14/2020 recommended to 7-day course of antibiotics with meropenem which was completed on 10/22/2020. Additionally, he was noted to have intermittent agitation so was started on risperdal, depakote, and gabapentin per psychiatric recommendations.  On 11/20/2020 he underwent left lower extremity debridement with wound VAC placement. Since then he has remained stable.  He has been deemed incapacitated to make medical decisions and guardianship was granted on 1/29/21. Placement will likely be in a group home.       Interval Problem Update  -Patient seen and examined.  Patient is pleasant and cooperative.  Patient denies any pain or discomfort at this time.  Patient is  oriented to self only.  Discussed with patient diagnosis, events leading to his hospitalization, and plan of care.  -Plan of care: Monitor safety; continue supportive care; monitor wound and any signs of infection; pending guardianship in process, patient likely will need placement.  -Lab work: Reviewed; last obtained on 3/18/2021 -unremarkable; we will order labs as warranted  -VSS at this time    ==========================================================================================  BARBARA ODOM, MSN, APRN, FNP-C, certify that the patient requires continued medically necessary hospital services for the treatment of cognitive impairment and will need long-term placement. The patient will remain in the hospital for the foreseeable future.  Discharge may or may not occur in the next 20 days due to ongoing discharge delays due to no medically acceptable discharge option.  ==========================================================================================    Consultants/Specialty  Wound Team  [facility]  Psychiatry  Infectious Disease    Code Status  Full Code    Disposition  -Guardianship established on 1/29/2021.  Pending acceptance and placement, updating financials.    Review of Systems  Review of Systems   Unable to perform ROS: Mental acuity        Physical Exam  Temp:  [36.1 °C (97 °F)-36.7 °C (98.1 °F)] 36.7 °C (98.1 °F)  Pulse:  [70-99] 99  Resp:  [16-18] 17  BP: (112-134)/(68-90) 134/90  SpO2:  [91 %-96 %] 91 %    Physical Exam  Vitals and nursing note reviewed.   HENT:      Head: Normocephalic.      Nose: Nose normal.      Mouth/Throat:      Mouth: Mucous membranes are moist.   Eyes:      Pupils: Pupils are equal, round, and reactive to light.   Cardiovascular:      Rate and Rhythm: Normal rate and regular rhythm.      Pulses: Normal pulses.      Heart sounds: Normal heart sounds.   Pulmonary:      Effort: Pulmonary effort is normal.      Breath sounds: Normal breath sounds.   Abdominal:       General: Bowel sounds are normal.      Palpations: Abdomen is soft.   Musculoskeletal:         General: Tenderness present. Normal range of motion.      Right lower leg: Edema present.      Left lower leg: Edema present.   Skin:     General: Skin is dry.      Capillary Refill: Capillary refill takes 2 to 3 seconds.   Neurological:      Mental Status: He is alert. Mental status is at baseline.         Fluids  No intake or output data in the 24 hours ending 04/01/21 1406    Laboratory                        Imaging  DX-CHEST-LIMITED (1 VIEW)   Final Result      No evidence of acute cardiopulmonary process.      IR-US GUIDED PIV   Final Result    Ultrasound-guided PERIPHERAL IV INSERTION performed by    qualified nursing staff as above.      IR-MIDLINE CATHETER INSERTION WO GUIDANCE > AGE 3   Final Result                  Ultrasound-guided midline placement performed by qualified nursing staff    as above.          IR-US GUIDED PIV   Final Result    Ultrasound-guided PERIPHERAL IV INSERTION performed by    qualified nursing staff as above.      IR-MIDLINE CATHETER INSERTION WO GUIDANCE > AGE 3   Final Result                  Ultrasound-guided midline placement performed by qualified nursing staff    as above.          US-KOSTAS SINGLE LEVEL BILAT   Final Result      CT-HEAD W/O   Final Result      No acute intracranial abnormality      DX-TIBIA AND FIBULA LEFT   Final Result      1.  Healed distal metaphyseal fractures of the left fibula and tibia with tibial IM layla in place.      2.  No acute fracture or dislocation.      3.  No radiopaque soft tissue foreign body.      DX-FEMUR-2+ LEFT   Final Result      1.  No radiographic evidence of acute traumatic injury left femur.      2.  Unchanged cortical thickening in the posterior aspect of the distal femoral diaphysis which may represent sequela of prior injury or infection.      3.  No soft tissue foreign body identified.      US-EXTREMITY VENOUS LOWER UNILAT LEFT  "  Final Result      DX-CHEST-PORTABLE (1 VIEW)   Final Result      No acute cardiopulmonary abnormality.           Assessment/Plan  * Wound of left leg- (present on admission)  Assessment & Plan  -Wound vac - he intermittently dislodges. Requires ongoing education  -Further management per wound care/LPS/ortho.   -Pain control. Long acting pain medications effective. continue as needed at lower dose.  -Improving slowly overall, however most recent wound care note states it is \"failing to contract\" and \"odor\"  - repeat wound culture not growing organisms, follow  -CBC not indicative of infection  Stable    Encephalopathy- (present on admission)  Assessment & Plan  -Improved   -Per psychiatry and Dr. Velázquez on 1/11: Patient is incapacitated.  -Public Guardian: Coretta   -Attempting placement; possibly [facility]    Psychiatric disorder- (present on admission)  Assessment & Plan  -Unspecified.  -Continue Risperdal and Depakote.  Stable on current treatment.  -Psychiatry has consulted and deemed him incapacitated to leave AMA or make medical decisions.  -Initial cognitive evaluation 8/20.   -SLP repeated Cognitive eval 1/2021- continue to recommend 24/7 supervision   -Public Guardian: Coretta   -Pending GH; obtaining financial status  Stable    Essential hypertension- (present on admission)  Assessment & Plan  -Well controlled on amlodipine    Emphysema/COPD (HCC)- (present on admission)  Assessment & Plan  -Chronic and stable off medication, without acute exacerbation    Protein malnutrition (HCC)- (present on admission)  Assessment & Plan  -Body mass index is 21.35 kg/m².   -Continue TID supplements.  -Encourage PO intake  Improving    Flexion contracture of knee, 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)  Assessment & Plan  -Secondary to chronic wound in that area.  -PT/OT.  -Does not seem to limit his mobility. Is frequently ambulatory.  -Continue PRN flexeril . Robaxin added 3/14   Encourage ambulation    Infection " of wound due to methicillin resistant Staphylococcus aureus (MRSA)- (present on admission)  Assessment & Plan  -History of MRSA.  -Poor compliance with OP wound care. Poor compliance with wound vac at times.   -Continue pain control as needed.  -LPS and wound care following - debridements and wound VAC changes per their recommendations  -Cultures repeated 12/14 - positive for Proteus species, pan sensitive.  Completed regimen of augmentin.   -3/18 wound care team reports odor, repeat wound culture so far negative  Likely resolved       VTE prophylaxis: Lovenox    ========================================================================================  Please note that this dictation was created using voice recognition software. I have made every reasonable attempt to correct obvious errors, but there may be errors of grammar and possibly content that I did not discover before finalizing the note.    Electronically signed by:  BARBARA Méndez, MSN, APRN, FNP-C  Hospitalist Services  [facility]  [phone]  [email or web address]  04/01/21       0277

## 2021-04-02 PROCEDURE — 700101 HCHG RX REV CODE 250: Performed by: NURSE PRACTITIONER

## 2021-04-02 PROCEDURE — 700102 HCHG RX REV CODE 250 W/ 637 OVERRIDE(OP): Performed by: NURSE PRACTITIONER

## 2021-04-02 PROCEDURE — A9270 NON-COVERED ITEM OR SERVICE: HCPCS | Performed by: NURSE PRACTITIONER

## 2021-04-02 PROCEDURE — 770001 HCHG ROOM/CARE - MED/SURG/GYN PRIV*

## 2021-04-02 PROCEDURE — 97605 NEG PRS WND THER DME<=50SQCM: CPT

## 2021-04-02 PROCEDURE — C5272 LOW COST SKIN SUBSTITUTE APP: HCPCS

## 2021-04-02 PROCEDURE — 15271 SKIN SUB GRAFT TRNK/ARM/LEG: CPT | Performed by: NURSE PRACTITIONER

## 2021-04-02 PROCEDURE — 306637 HCHG MISC ORTHO ITEM RC 0274

## 2021-04-02 PROCEDURE — C5271 LOW COST SKIN SUBSTITUTE APP: HCPCS

## 2021-04-02 PROCEDURE — 15272 SKIN SUB GRAFT T/A/L ADD-ON: CPT | Performed by: NURSE PRACTITIONER

## 2021-04-02 RX ORDER — OXYCODONE HYDROCHLORIDE 5 MG/1
5 TABLET ORAL EVERY 6 HOURS PRN
Status: DISCONTINUED | OUTPATIENT
Start: 2021-04-02 | End: 2021-05-03 | Stop reason: HOSPADM

## 2021-04-02 RX ORDER — TRAZODONE HYDROCHLORIDE 100 MG/1
50 TABLET ORAL
Status: DISCONTINUED | OUTPATIENT
Start: 2021-04-02 | End: 2021-05-03 | Stop reason: HOSPADM

## 2021-04-02 RX ADMIN — LIDOCAINE HYDROCHLORIDE 20 ML: 40 SOLUTION TOPICAL at 10:16

## 2021-04-02 RX ADMIN — HYDROXYZINE HYDROCHLORIDE 25 MG: 50 TABLET, FILM COATED ORAL at 08:22

## 2021-04-02 RX ADMIN — OXYCODONE 5 MG: 5 TABLET ORAL at 09:49

## 2021-04-02 RX ADMIN — RISPERIDONE 0.5 MG: 0.5 TABLET ORAL at 05:01

## 2021-04-02 RX ADMIN — GABAPENTIN 300 MG: 300 CAPSULE ORAL at 05:01

## 2021-04-02 RX ADMIN — MORPHINE SULFATE 15 MG: 15 TABLET, FILM COATED, EXTENDED RELEASE ORAL at 17:03

## 2021-04-02 RX ADMIN — METHOCARBAMOL TABLETS 500 MG: 500 TABLET, COATED ORAL at 22:20

## 2021-04-02 RX ADMIN — TRAZODONE HYDROCHLORIDE 50 MG: 100 TABLET ORAL at 22:20

## 2021-04-02 RX ADMIN — MORPHINE SULFATE 15 MG: 15 TABLET, FILM COATED, EXTENDED RELEASE ORAL at 05:01

## 2021-04-02 RX ADMIN — LIDOCAINE 1 PATCH: 50 PATCH CUTANEOUS at 12:10

## 2021-04-02 RX ADMIN — AMLODIPINE BESYLATE 5 MG: 5 TABLET ORAL at 05:01

## 2021-04-02 RX ADMIN — DIVALPROEX SODIUM 125 MG: 125 CAPSULE ORAL at 05:01

## 2021-04-02 RX ADMIN — METHOCARBAMOL TABLETS 500 MG: 500 TABLET, COATED ORAL at 05:01

## 2021-04-02 RX ADMIN — RISPERIDONE 1 MG: 1 TABLET ORAL at 17:04

## 2021-04-02 RX ADMIN — DIVALPROEX SODIUM 125 MG: 125 CAPSULE ORAL at 13:26

## 2021-04-02 RX ADMIN — DOCUSATE SODIUM 50 MG AND SENNOSIDES 8.6 MG 2 TABLET: 8.6; 5 TABLET, FILM COATED ORAL at 05:01

## 2021-04-02 RX ADMIN — METHOCARBAMOL TABLETS 500 MG: 500 TABLET, COATED ORAL at 12:10

## 2021-04-02 RX ADMIN — DIVALPROEX SODIUM 125 MG: 125 CAPSULE ORAL at 22:20

## 2021-04-02 RX ADMIN — METHOCARBAMOL TABLETS 500 MG: 500 TABLET, COATED ORAL at 17:04

## 2021-04-02 RX ADMIN — GABAPENTIN 300 MG: 300 CAPSULE ORAL at 12:10

## 2021-04-02 RX ADMIN — GABAPENTIN 300 MG: 300 CAPSULE ORAL at 18:00

## 2021-04-02 ASSESSMENT — PAIN DESCRIPTION - PAIN TYPE
TYPE: CHRONIC PAIN

## 2021-04-02 NOTE — PROGRESS NOTES
"WOUND CARE PROVIDER PROGRESS NOTE        HPI: 65-year-old male homelessness, EtOH use, tobacco dependence, chronic left lower extremity wound admitted 8/7/2020 with left lower extremity wound infection.  Patient was recently hospitalized at [facility] in April 2020, evaluated by orthopedic surgery who recommended wound care.  Wound culture grew MSSA and strep.  Patient was recently seen at [facility] prior to this admission was given a prescription for antibiotics but did not fill this.  Patient reports that he has had this wound for 8 to 10 years.  He reports that he fell and went \"ass over tea kettle\".  He reports that he would clean the wound almost daily with soap and water at the homeless shelter.  Per chart review, patient reported he developed the ulcer in May 2018 when he fell while hiking.  Patient was seen at wound care clinic in August 2018.  Patient had a  assisting him while he was living at [facility].  Wound VAC /was ordered however  had concerns with patient's compliancy and/ access to medical care.  Skilled nursing facility was contacted to have patient be admitted, however he was not accepted due to Medicaid.    Not on any blood thinners.  Patient ambulatory in halls.  Allergies reviewed.  He denies any allergies.    Surgery date: 9/1/2020 by Belkis ELLER  Procedure: Excision debridement to left leg ulcer with injectable lidocaine and epinephrine     Surgery date: 11/19/2020 by Dr. Olvera  Procedure:1.  Irrigation and debridement of multiple compartments of the left leg with 2   separate 16 cm x 5 cm superficial ulcerations in posterior knee and calf.  2.  AmnioFix biologic sheet placement, left leg.  3.  Wound VAC placement, left leg, 16x5 + 16x5 cm.      Surgery date: 2/24/21 by Belkis ELLER  Procedure: Excision debridement to left leg ulcer with injectable lidocaine and epinephrine             Interval hx:   9/11/2020: pt calm cooperative. On " zyvox. Seen during VAC and two layer compression wrap change. Pt tolerating VAC and wraps. Wound decreased in size.   9/18/2020: Patient denies any fevers, chills, nausea, vomiting.  He is tolerating the veraflo wound VAC and 2 layer compression wrap.  Wound is decreasing in size.  Increased granular tissue noted, wound edges have improved however he does have rolled edges to popliteal fossa area.  Patient calm and cooperative, watching sports.  9/30/2020: Denies fevers, chills, nausea, vomiting.  Tolerating wound VAC and 2 layer compression wrap.  Refused nail care.  Wound culture positive for strep A and Proteus.  10/7/2020: completed 5 day course of zyvox. Denies fevers chills nausea vomiting.  Seen during veraflo VAC and 2 layer compression wrap change.    10/14/2020: Patient denies fevers, chills, nausea, vomiting.  Patient wound is malodorous complaining of increased pain to the wound.  Increased slough noted to wound bed despite veraflo wound VAC.  Reconsult ID.  10/16/2020: Patient seen during wound VAC change with Miranda wound care PT. patient denies fever, chills, nausea, vomiting.  Patient reports pain to wound and increase in pain with wound VAC change.  Patient has granular tissue throughout wound with mild amount of slough to posterior aspect of leg.  Malodorous with VAC removal.  Wound VAC nursing changed.  10/29/2020: Wound VAC was discontinued by wound team on 10/23/2020 due to slow progress and collagen was started.  Patient has completed 7-day antibiotic course of meropenem for multidrug resistant Proteus vulgaris.  Patient found to have lice and has been treated.  Nonfoul-smelling odor present with dressing removal.  11/5/2020: Seen with wound team during 2 layer compression dressing change and for excisional debridement.  No odor noted today.  Thick layer of biofilm noted.  Wound edges continue to improve but still remain to medial aspect of wound.  11/17/2020: Seen with wound team during  dressing and 2 layer compression wrap change.  Patient with severely thick scar tissue to wound edges.  Excisional debridement with injectable lidocaine and epinephrine performed today.  Patient denies any fevers, chills, nausea, vomiting.  11/23/2020: POD #4 SP left leg I&D with biologic and VAC placement.  VAC functioning.  Foul odor coming from wound.  11/30/2020: POD #11.  Patient tired of walking around with wound VAC machine.  But agreeable to continue with wound VAC.  Mild odor coming from wound with wound VAC removal.  12/7/2020: POD #18.  Seen during wound VAC change.  Sitter no longer at bedside.  12/14/2020 POD # 25.  Seen during wound VAC change with wound team.  Previous wound culture from last week showed organisms but they were not worked up.  New wound culture to be taken today.  Odor remains slightly diminished.  Drainage heavy, slightly decreased from last week.  Continue with nystatin powder and silver foam.  Patient tired today.  12/16/2020: Wound culture 12/14/2020 + for Proteus Mirabilis.  Discussed case with ID, previously following this admit.  Recommended Augmentin for 1 week.  12/17/2020: Patient not drinking boost.  Has stockpile in room.  Dietary consulted.  Started Augmentin yesterday. Contacted micro to review culture.  So far growing Proteus and diphtheroids.  Micro to assess for fungal component.  1/4/2021: Wounds have  into 2 wounds again and are decreasing in size.  No longer doing silver foam but rather Arglaes powder and nystatin powder.  Odor remains.  Completed antibiotics.  Guardianship hearing is 1/29/2021.  Had arginaid supplement 4 times per day for 2 weeks that started on 12/17/2020.  1/22/2021: Seen with wound team for VAC change.  Odor is decreased.  Skin bridges have formed and now patient has 3 smaller wounds.  However there is increased periwound breakdown.  Patient experiencing pain with VAC change to proximal thigh.  Topical lidocaine used.  Wound VAC held  for weekend due to periwound breakdown.  2/5/2021: Seen with wound team during VAC change.  Odor remains the same.  There is no longer skin bridge to the medial wound  it into 2.  Patient has drainage seeping out underneath the drape causing periwound irritation.  Excisional debridement was performed today.  Patient has guardian.  2/24/21: Seen with wound team during VAC change.  Odor remains.  Epibole to skin edges.  There is significant amount of slough and tenderness to lateral proximal thigh ulcer.  Excisional debridement performed today with injectable lidocaine and epinephrine.  Consent obtained from guardian.  3/3/21: POD # 7 s/p bedside I & D. Restarted L arginine supplements. Has not drank supplement yet today.   3/10/2021: POD #14.  Drinking L-arginine supplements.  Showered before wound VAC change.  Patient trimmed his toenails and fingernails last week.  3/24/2021: Has completed L-arginine supplements.  Wound remains unchanged.  Consider plastic surgery consult for skin graft.  4/2/2021: Patient in a pleasant mood.  Less odor to wound.  Wounds to leg have only slightly decreased in size.  Excisional debridement and application of amniofix followed by VAC applied today.        Results:  Wound culture left leg 12/14/2020: No fungal growth, Proteus mirabilis  Wound culture left leg 12/7/2020:Light growth mixed organisms.   Covid screen not detected 11/17/2020  CBC 11/22/2020: WBC 6.5, hemoglobin and hematocrit 9.9/30.5.  Platelet count 403.  CBC with differential 10/14/2020: WBC 6, H&H 11/33.9  Wound culture: 9/28/2020: Positive for beta-hemolytic strep group A, Proteus.    Wound cx: 9/1/2020 mrsa, diphtheroids, beta hemolytic strep group A    8/7/2020: X-ray tib-fib left:  1.  Healed distal metaphyseal fractures of the left fibula and tibia with tibial IM layla in place.     2.  No acute fracture or dislocation.     3.  No radiopaque soft tissue foreign body.      Arterial studies:    9/2/2020  Right.    Doppler waveforms of the common femoral artery are of high amplitude and    triphasic.    Doppler waveforms at the ankle are brisk and triphasic.    Ankle-brachial index is normal.       Left.    Doppler waveforms of the common femoral artery are of high amplitude and    triphasic.    Doppler waveforms at the ankle are brisk and triphasic.    Ankle-brachial index is normal.    Unable to obtained popliteal waveforms due to wound bandages.          Exam:    Palpable PT pulse to left foot +1 foot   +2 DP left foot  Difficulty palpating popliteal due to scar tissue  Able to extend left leg to 160 degrees. Able to flex left leg around 80 degrees            Left lower leg full-thickness ulcer  Odor decreased slightly.  Drainage has decreased slightly  Medial wound is no longer .        Lateral ulcer:  Wound edges flat, except for approximately 2 cm area to medial edge  Thick biofilm, increased in granular tissue to proximal aspect  tenderness improved to proximal aspect  No periwound erythema  periwound improved since using hydrocolloid  Edema controlled  Measures: 12.5 x 2.6 x 0.1      Medial ulcer:  Wound edges flat  Thick biofilm over wound  Edema controlled  No periwound irritation noted   measurement: 13 x 3.5 x 0.1 cm        PROCEDURE: 2% viscous topical lidocaine applied to both leg wounds, dwelled for approximately 5 minutes.  -Curette used to debride wound beds.  Excisional debridement was performed to remove devitalized tissue until healthy, bleeding tissue was visualized.   Entire surface of wound, cm2 debrided.  Tissue debrided into the subcutaneous layer.     -Bleeding controlled with manual pressure.    -Under sterile conditions, one 2 x 12 cm amniofix was cut and applied to the medial wound bed.  Two 2 x 12 cm amnio fix products were cut and applied to the lateral wound bed.  Amniofix was moistened with NS followed by Adaptic and Steri-Strips to secure the product for both  wounds.  No wastage of product.  -Wound care completed by wound RN, refer to flowsheet  -Patient tolerated the procedure well, without c/o pain or discomfort.       Post debridement measurements:  Lateral: 12.5 x 4.5 x 0.2 cm  Medial: 13.1 x 2.6 x 0.2 cm      Amniofix lo2021: Amniofix  2 x 12 cm, expires 2023       Amniofix  2 x 12 cm, expires 2024       Amniofix  2 x 12 cm, expires 2023               Predebridement photos:  Lateral wound          Medial wound            Post debridement photos:  Lateral wound      Medial wound                        ASSESSMENT/PLAN:  65-year-old male with homelessness, EtOH use, tobacco use, and chronic left leg ulcer.  SP left leg I&D with amnio fix and wound VAC placement by Dr. Olvera on 2020    -Wound remains as 2 wounds.  Skin bridge to medial wound is no longer present   Wound size slightly decreased but remains essentially unchanged.   -L arginine supplements restarted 3/2/21.  Patient has completed 2-week course of supplements.  -periwound irritation resolved with hydrocolloid.  -Odor remains, heavy drainage slightly decreased  -biofilm debrided each visit  -Edema controlled with 2 layer compression wrap.  -Patient has completed 1 week of Augmentin and L-arginine supplement for 2 weeks in 2020  -Patient to shower before each wound VAC change  -Amnio fix followed by wound VAC applied to both wounds.      PLAN  -Continue wound VAC.  Change 3 times per week.   -APRN will return weekly for assessment, debridement, application of amnio fix  -Wound care: Hydrocolloid to periwound, Adaptic over amnio fix, Steri-Strips, black foam, drape to wound at 150 mmHg continuous  -Continue 2 layer compression wrap extending to thigh  -shower prior to VAC changes, ok to remove 2 layer compression wrap to shower    Patient to continue to be seen by wound care team while admitted   wound care nurse practitioner to follow weekly      Discharge plan:  Working  on group home placement.  Patient has guardian.      D/W: Patient, RN, RN with wound team, Margarita graham rep, Nathalia [employer] rep

## 2021-04-02 NOTE — CARE PLAN
Problem: Safety  Goal: Will remain free from injury  Outcome: PROGRESSING AS EXPECTED  Note: Safety precautions are in place including bed locked and in lowest position, upper bed rails up, bed alarm on, call light within reach, treaded socks on, tray table and personal belongings within reach.      Problem: Infection  Goal: Will remain free from infection  Outcome: PROGRESSING AS EXPECTED  Note: Standard precautions are in place. Measures are in place to keep patient free from infection including handwashing before and after patient contact and proper use of PPE.

## 2021-04-02 NOTE — WOUND TEAM
[facility]  Inpatient Services  Wound and Skin Care Progress Note    Admission Date: 8/7/2020     Last order of IP CONSULT TO WOUND CARE was found on 9/1/2020 from Hospital Encounter on 8/7/2020     HPI, PMH, SH: Reviewed    Unit where seen by Wound Team: [id number]    WOUND CONSULT/FOLLOW UP RELATED TO:  Left leg scheduled negative pressure wound therapy (npwt) dressing change and 2 layer compression wrap change    OBJECTIVE: NPWT dressing and 2 layer compression wrap in place. Patient on pressure redistribution mattress, up self, ambulates frequently, turns self.    WOUND TYPE, LOCATION, CHARACTERISTICS (Pressure Injuries: location, stage, POA or date identified)      Negative Pressure Wound Therapy 11/20/20 Leg Lateral;Posterior;Medial Left (Active)   Vacuum Serial Number [id number] 03/01/21 1000   NPWT Pump Mode / Pressure Setting Continuous;150 mmHg 04/02/21 1000   Dressing Type Black Foam (Regular) 04/02/21 1000   Number of Foam Pieces Used 5 04/02/21 1000   Canister Changed No 04/02/21 1000   Output (mL) 50 mL 03/31/21 0400   NEXT Dressing Change/Treatment Date 04/05/21 04/02/21 1000   WOUND NURSE ONLY - Time Spent with Patient (mins) 120 04/02/21 1000           Wound 11/19/20 Full Thickness Wound Leg Lateral;Posterior;Medial Left (Active)   Wound Image      04/02/21 1000   Site Assessment Red;Bleeding;Painful 04/02/21 1000   Periwound Assessment Scar tissue;Edema 04/02/21 1000   Margins Defined edges;Attached edges 04/02/21 1000   Closure Secondary intention 04/02/21 1000   Drainage Amount Moderate 04/02/21 1000   Drainage Description Sanguineous 04/02/21 1000   Treatments Cleansed;Irrigation;Site care;Topical Lidocaine;Provider debridement 04/02/21 1000   Wound Cleansing Approved Wound Cleanser 04/02/21 1000   Periwound Protectant Hydrocolloid;Drape;Skin Protectant Wipes to Periwound 04/02/21 1000   Dressing Cleansing/Solutions Not Applicable 03/30/21 1039   Dressing Options Biologic (Skin  Substitute);Adaptic;Wound Vac 04/02/21 1000   Dressing Changed Changed 04/02/21 1000   Dressing Status Clean;Dry;Intact 04/02/21 1000   Dressing Change/Treatment Frequency By Wound Team Only 04/02/21 1000   NEXT Dressing Change/Treatment Date 04/05/21 04/02/21 1000   NEXT Weekly Photo (Inpatient Only) 04/07/21 03/30/21 0900   Non-staged Wound Description Full thickness 04/02/21 1000   Wound Length (cm) 12.6 cm 02/22/21 0830   Wound Width (cm) 3 cm 02/22/21 0830   Wound Depth (cm) 0.3 cm 03/24/21 1500   Wound Surface Area (cm^2) 37.8 cm^2 02/22/21 0830   Wound Volume (cm^3) 11.34 cm^3 02/22/21 0830   Post-Procedure Length (cm) 15 cm 02/03/21 1100   Post-Procedure Width (cm) 3.4 cm 02/03/21 1100   Post-Procedure Depth (cm) 0.2 cm 02/03/21 1100   Post-Procedure Surface Area (cm^2) 51 cm^2 02/03/21 1100   Post-Procedure Volume (cm^3) 10.2 cm^3 02/03/21 1100   Wound Healing % -11 02/22/21 0830   Wound Bed Granulation (%) 100 % 03/30/21 0900   Wound Bed Slough (%) 10 % 03/17/21 1500   Wound Bed Granulation (%) - Post-Procedure 100 % 03/24/21 1500   Tunneling (cm) 0 cm 03/26/21 1400   Undermining (cm) 0 cm 03/26/21 1400   Shape oval  04/02/21 1000   Wound Odor Strong 04/02/21 1000   Pulses Left;2+;DP;PT 03/15/21 1600   Exposed Structures None 04/02/21 1000   WOUND NURSE ONLY - Time Spent with Patient (mins) 120 04/02/21 1000          Lab Values:    Lab Results   Component Value Date/Time    WBC 5.9 03/18/2021 11:09 AM    RBC 4.19 (L) 03/18/2021 11:09 AM    HEMOGLOBIN 11.2 (L) 03/18/2021 11:09 AM    HEMATOCRIT 34.2 (L) 03/18/2021 11:09 AM    CREACTPROT 1.07 (H) 08/12/2020 01:55 PM    SEDRATEWES 85 (H) 08/07/2020 01:20 PM    HBA1C 5.7 (H) 11/09/2018 06:30 PM      Culture Results show:  Recent Results (from the past 720 hour(s))   CULTURE WOUND W/ GRAM STAIN    Collection Time: 09/01/20  2:00 PM    Specimen: Left Leg; Wound   Result Value Ref Range    Significant Indicator POS (POS)     Source WND     Site LEFT LEG      Culture Result - (A)     Gram Stain Result       Moderate Gram positive cocci.  Moderate Gram positive rods.      Culture Result (A)      Beta Hemolytic Streptococcus group A  Moderate growth      Culture Result (A)      Methicillin Resistant Staphylococcus aureus  Moderate growth      Culture Result (A)      Diphtheroids  Moderate growth  Mixed morphologies.     KOSTAS: 9/20/21   Left.    Doppler waveforms of the common femoral artery are of high amplitude and    triphasic.    Doppler waveforms at the ankle are brisk and triphasic.    Ankle-brachial index is normal.    Unable to obtained popliteal waveforms due to wound bandages.     Self Report / Pain Level: Pre-medicated with oxycodone PRN. Liquid and viscous lidocaine used.     INTERVENTIONS BY WOUND TEAM: INTERVENTIONS BY WOUND TEAM:  Performed standard wound care which includes appropriate positioning, dressing removal and non-selective debridement. Pictures and measurements obtained weekly if/when required.    Cleansed: Wound cleanser and gauze used to clean wound beds and periwound  Debridement: Performed by provider JUANITO Hernandez. Please see APRN's note for details.   Periwound:Skin protectant swabs used on addison-skin and hydrocolloid applied around wound border.   Primary - Biologic applied by APRN, secured with adaptic and steri-strips. 2 piece of black foam half thickness cut to fit and placed over each wound bed and 1 black piece used to bridge.  Drape applied and NPWT restarted.  Secondary - 2 layer compression wrap     MEASUREMENTS:     Pre-procedure:   Medial 13 x 3.5 x 0.1  Lateral 12.5 x 2.6 x 0.1    Post-procedure:   Medial 13.1 x 2.6 x 0.1  Lateral 12.5 x 4.5 x 0.1    Interdisciplinary consultation: Patient, Bedside RN, Asaf Portillo APRN and  for biologic     EVALUATION / RATIONALE FOR TREATMENT:    4/2/2021 - Debridement performed by APRN as patient's wounds continue to fail to contract. Biologic in  place to bilateral wound beds and will be applied by RAFITA Goldberg again next Friday 4/11/2021. During next vac change, adaptic to remain in place as it is protecting the biologic underneath.     3/30/21: Wound odor mild-moderate and wound bed bright red, now 100% granulation tissue.  Meredith-skin continues to be fragile/flakey; protected with hydrocolloid dressing to wound border.    3/26/21: Foul odor present at dressing removal, but significantly decreased after cleansing.  Continue current POC.  3/24/21 Wound bed red, with less drainage noted.  Strong, foul odor still present.  APRN to see pt regarding wound bed. Continue with current plan of care.   3/22/21: Area unchanged from previous dressing change.  Wound malodorous.  Meredith-skin dry and flaky at superior wound border, hydocolloid thin dressing placed.   3/13/21 Wounds failing to contract.  Will trial intermittent wound vac setting to encourage greater stimulation of cellular activity in wound bed.    3/10/2021: debrided wound, odor present, continue NPWT  3/6/12 area unchanged, odor persists  3/1/21: Lidocaine unavailable during this dressing change. Small amount of debridement completed with curette. Continued with silver powder for its antimicrobial properties.   2/27/21 skin bridge enlarging  2/24/2021: Excisional debridement by LPS APRN performed for rolled edges that were starting to form along the posterior and medial left leg wound. Moderate bleeding managed by administration of lidocaine injection. CSWD performed for slough. Wound bed is beefy red following debridement. Resume agata and silver foam to manage bioburden. Continue with CSWD with each dressing change.   2/22/2021: Rolled edges starting to form along posterior thigh wound bed - may require excisional debridement by provider. Minimal changes to actual wound bed. Continue CSWD, agata, and silver foam to manage bioburden.   2/19/21: minimal to no changes from last assessment. Continue  CSWD, agata, and silver foam for bioburden.  2/17/2021: Periwound remains intact. Adherent slough noted over wound bed. Scant bleeding noted on medial wound following CSWD. Continue with Agata over wound beds to further stimulate epithelization. Continue with silver foam to manage bioburden.   2/12/2021:periwound intact does not appear macerated.  Moderate amount of drainage. Wound bed debrided.   Picture frame draped over periwound, three silver foams used.  Continue NPWT and two layer compression.    2/10/21: Periwound looks less denuded today. Did not place the arglaes powder to wound bed - switching to Agata to see if it will stimulate healing in his wound bed. Did not use hydrofiber to periwound but it is in the room for next visit in case it is needed. Ordered and used liquid lidocaine to remove vac dressing and lidocaine gel to the posterior proximal part of wound. Patient tolerated debridement well with lidocaine.   02/05/21: Meredith wound is denuded and wet, vac drape lifting under wrap, Aquacel to dry wet and denuded skin, no silver foam available this dressing change.  02/03/21: Measurements are smaller for both medial and posterior thigh.  Wound bed has granulation and non-viable slough.  Will continue to debride every time with wound VAC changes to decrease bioburden in wound bed.   02/01/21:  No change.  Wounds appear to be improving slowly.      01/29/2021: wounds flush with periwound, fully granulated with scant yellow to a few areas. Medial periwound intact, lateral periwound with some erythema. LPS APRN suggested that next time applying brava strips to any irritated periwound may help reduce irritation.   1/27/21: Restarted NPWT, meredith-wound looks better.   1/25/21: denuded tissue, vac vacation for until 1/27.  Re-assessment to place wound VAC to left LE.   1/21/21 area has decreased since last measurements.  Odor still present.  Meredith skin compromised probably yeast infection under VAC drape.  Will  hold VAC for 72 hours and re assess.  Nystatin to address yeast and dry out addison skin  1/15/21: position of leg may affect posterior thigh measurements.  Medial Leg wound area decreased.    01/10/2021: Wound vac replaced, patient accidentally removed vac and bedside RN was unable to repair.  Patient tolerated wound VAC placement. Patient had moderate ss with some green tinge drainage.   01/04/2021: 2 layer compression wrap re-applied to left LE due to ACE wrap leaving too much indentation to left LE.   12/31/2020 thigh wound much narrower.  Biofilm redeveloping and odor still present.   12/27/20: Wound bed granular and odor has lessen but still persists.   12/18/20 wound length decreased.  Odor persists.    12/14/2020: re-cultured wound bed.  12/11/2020 POD 23 Length of wounds has decreased, width has not changed.  Odor persists.  Pt continues to become angry with VAC when it limits his mobility.    12/7/2020 POD 19 odor persists, improved wound bed after debridement.  Possible bacterial vs yeast vs fungal infection.  Culture taken.  Nystatin to treat possible fungal infection, silver foam to decrease microbial population.    12/4/2020 POD# 16 odor worsening, more yellow tissue present, will add nystatin and silver foam next week and consider a culture  11/30 POD#11 granular buds visible to wound bed.  Same as prior.   11/27 POD#8 wound is odorous likely related to biologic dressing.  No overt signs of infection.  Granular buds are visible through adaptic.  Edges do not appear as thick as they were prior to last sx.  Continue with current treatment.    11/23: POD#4 s/p I&D of left LE wounds and biologic placed by Dr. Olvera on 11/19.  Wound bed is flatter and raised edges scar tissue seems to be gone.   11/14 Posterior thigh aspect of wound deteriorating, will increase frequency of treatment to keep biofilm down and hopefully avoid systemic abx.  Will include thigh in compression wrap.  Will consider culture if  improvement is not seen in the next few treatments.    11/10: APRN unavailable at this time.  Will re-schedule excisional debridement to next week.   11/5: Plan for debridement of scarred edges on Tues by LPS. Tendon exposed to posterior aspect of wound, behind knee. Continue with current dressing care.  10/29: Excisional debridement by Asaf ELLER of scar tissue along the proximal edge of the posterior knee and lateral thigh.  Lateral aspect of thigh is flatten.  Medial aspect of knee has thick scar tissue that was excisionally debrided by Asaf ELLER.  Fully granulated throughout the wound bed.   10/23 wound bed mostly granular, superficial in depth, progress has been slow with the wound VAC so will trial collagen product to encourage new tissue growth.    10/19: wound bed has improved in color and is fully granulated.  Addison-wound has improved, denudation has resolved. APRN continuing with serial weekly debridements as needed.   10/16: wound friable pt now on iv abx per ID.  Indurated edges addressed with drape and foam.  Addison wound likely has yeast infection - addressing with silver powder crusting  10/14: patient seen with Asaf ELLER, stopping veraflo instillation.  Starting silver powder and regular wound VAC to see if it will help with bioburden.  Possible colonization of bacteria.  ID involved.   10/12: Inflammation noted to the proximal part of the wound bed.  Will continue to monitor, possible vac break on Wednesday to help addison-skin inflammation.   10/7: Excisional debridement performed by Asaf ELLER. Will need to continue selective debridement with NPWT changes, and weekly excisional debridement with APRN to flatten out the scar tissue.  IV abx therapy ended 10/5.   10/5: Lateral wound still with some slough, both wounds smaller per measurements. Medial wound with all granular tissue.  9/30: Patient to start abx therapy for 7 days course per Dr. Ellis.  Started dakin's  instillation to veraflo  9/28: malodorous today, culture taken.    9/25: Odor noted, though unclear if from wound or pt, consider shower before next Vac change. Consider regular vac next change, wound granular and superficial.   9/23: Patient still awaiting guardianship for placement.   9/18: wound granulating nicely and responding well to current treatment.    9/16: Wound bed with granular tissue, edges are thick but appear better than previous assessments. . NPWT VF to encourage closure by secondary intention and manage drainage while encouraging oxygenation and granulation to the wound bed. 2 layer compression to assist in decrease of swelling and encourage blood flow to wounds. Wound team to follow up and change 3x/week.      Goals: Steady decrease in wound area and depth weekly.    NURSING PLAN OF CARE ORDERS (X):    Dressing changes: See Dressing Care orders: X  Skin care: See Skin Care orders:   Rectal tube care: See Rectal Tube Care orders:   Other orders:      WOUND TEAM PLAN OF CARE:   Dressing changes by wound team:        Follow up 3 times weekly:                NPWT change 3 times weekly:  X,  NPWT changes 3x/ weekly with 2 layer compression wrap.  Follow up 1-2 times weekly:      Follow up Bi-Monthly:                   Follow up as needed:       Other (explain):     Anticipated discharge plans:  LTACH:        SNF/Rehab: X - patient will need ongoing wound care for LLE upon discharge - 3x/weekly           Home Health Care:           Outpatient Wound Center:            Self Care:

## 2021-04-03 PROCEDURE — 700102 HCHG RX REV CODE 250 W/ 637 OVERRIDE(OP): Performed by: NURSE PRACTITIONER

## 2021-04-03 PROCEDURE — A9270 NON-COVERED ITEM OR SERVICE: HCPCS | Performed by: NURSE PRACTITIONER

## 2021-04-03 PROCEDURE — 700101 HCHG RX REV CODE 250: Performed by: NURSE PRACTITIONER

## 2021-04-03 PROCEDURE — 770001 HCHG ROOM/CARE - MED/SURG/GYN PRIV*

## 2021-04-03 RX ADMIN — METHOCARBAMOL TABLETS 500 MG: 500 TABLET, COATED ORAL at 11:19

## 2021-04-03 RX ADMIN — DIVALPROEX SODIUM 125 MG: 125 CAPSULE ORAL at 20:05

## 2021-04-03 RX ADMIN — OXYCODONE 5 MG: 5 TABLET ORAL at 20:05

## 2021-04-03 RX ADMIN — METHOCARBAMOL TABLETS 500 MG: 500 TABLET, COATED ORAL at 04:59

## 2021-04-03 RX ADMIN — GABAPENTIN 300 MG: 300 CAPSULE ORAL at 04:59

## 2021-04-03 RX ADMIN — RISPERIDONE 1 MG: 1 TABLET ORAL at 17:27

## 2021-04-03 RX ADMIN — METHOCARBAMOL TABLETS 500 MG: 500 TABLET, COATED ORAL at 17:27

## 2021-04-03 RX ADMIN — MORPHINE SULFATE 15 MG: 15 TABLET, FILM COATED, EXTENDED RELEASE ORAL at 17:27

## 2021-04-03 RX ADMIN — DIVALPROEX SODIUM 125 MG: 125 CAPSULE ORAL at 04:59

## 2021-04-03 RX ADMIN — RISPERIDONE 0.5 MG: 0.5 TABLET ORAL at 04:59

## 2021-04-03 RX ADMIN — MORPHINE SULFATE 15 MG: 15 TABLET, FILM COATED, EXTENDED RELEASE ORAL at 04:59

## 2021-04-03 RX ADMIN — OXYCODONE 5 MG: 5 TABLET ORAL at 02:26

## 2021-04-03 RX ADMIN — GABAPENTIN 300 MG: 300 CAPSULE ORAL at 11:19

## 2021-04-03 RX ADMIN — OXYCODONE 5 MG: 5 TABLET ORAL at 13:36

## 2021-04-03 RX ADMIN — DIVALPROEX SODIUM 125 MG: 125 CAPSULE ORAL at 13:36

## 2021-04-03 RX ADMIN — GABAPENTIN 300 MG: 300 CAPSULE ORAL at 17:27

## 2021-04-03 RX ADMIN — METHOCARBAMOL TABLETS 500 MG: 500 TABLET, COATED ORAL at 20:05

## 2021-04-03 RX ADMIN — TRAZODONE HYDROCHLORIDE 50 MG: 100 TABLET ORAL at 20:05

## 2021-04-03 RX ADMIN — DOCUSATE SODIUM 50 MG AND SENNOSIDES 8.6 MG 2 TABLET: 8.6; 5 TABLET, FILM COATED ORAL at 04:59

## 2021-04-03 RX ADMIN — LIDOCAINE 1 PATCH: 50 PATCH CUTANEOUS at 11:19

## 2021-04-03 RX ADMIN — AMLODIPINE BESYLATE 5 MG: 5 TABLET ORAL at 04:59

## 2021-04-03 ASSESSMENT — PAIN DESCRIPTION - PAIN TYPE
TYPE: CHRONIC PAIN

## 2021-04-03 NOTE — PROGRESS NOTES
Mobility Progress Note    Surgery patient: N/a  Date of surgery: n/a  Ambulated 50 ft on day of surgery? (N/A if patient did not undergo surgery today): n/a  Number of times ambulated 50 feet or greater today: Greater than 5  Patient has been up to chair, edge of bed or HOB 90 degrees for all meals?: Yes  Goal met? (goal is ambulating at least 50 feet 2 times on day shift, one time on night shift): Yes  If patient did not meet mobility goal, why?: n/a

## 2021-04-03 NOTE — CARE PLAN
Problem: Safety  Goal: Will remain free from injury  Outcome: PROGRESSING SLOWER THAN EXPECTED     Problem: Pain Management  Goal: Pain level will decrease to patient's comfort goal  Outcome: PROGRESSING AS EXPECTED     Problem: Mobility  Goal: Risk for activity intolerance will decrease  Outcome: PROGRESSING AS EXPECTED     Patient ambulates independently. Patient is steady when ambulating. Pain medication administered as needed per MAR. Bed locked in lowest position, call light within reach.

## 2021-04-03 NOTE — CARE PLAN
Problem: Safety  Goal: Will remain free from falls  Description: Pt mobilizes frequently independently.   Outcome: PROGRESSING AS EXPECTED   Ambulates independently with steady gait    Problem: Venous Thromboembolism (VTW)/Deep Vein Thrombosis (DVT) Prevention:  Goal: Patient will participate in Venous Thrombosis (VTE)/Deep Vein Thrombosis (DVT)Prevention Measures  Outcome: PROGRESSING AS EXPECTED   Ambulates frequently    Problem: Discharge Barriers/Planning  Goal: Patient's continuum of care needs will be met  Outcome: PROGRESSING AS EXPECTED   Difficult discharge.

## 2021-04-04 PROCEDURE — A9270 NON-COVERED ITEM OR SERVICE: HCPCS | Performed by: NURSE PRACTITIONER

## 2021-04-04 PROCEDURE — 99231 SBSQ HOSP IP/OBS SF/LOW 25: CPT | Performed by: NURSE PRACTITIONER

## 2021-04-04 PROCEDURE — 700101 HCHG RX REV CODE 250: Performed by: NURSE PRACTITIONER

## 2021-04-04 PROCEDURE — 700102 HCHG RX REV CODE 250 W/ 637 OVERRIDE(OP): Performed by: NURSE PRACTITIONER

## 2021-04-04 PROCEDURE — 770001 HCHG ROOM/CARE - MED/SURG/GYN PRIV*

## 2021-04-04 PROCEDURE — 97605 NEG PRS WND THER DME<=50SQCM: CPT

## 2021-04-04 RX ADMIN — GABAPENTIN 300 MG: 300 CAPSULE ORAL at 05:06

## 2021-04-04 RX ADMIN — RISPERIDONE 1 MG: 1 TABLET ORAL at 20:42

## 2021-04-04 RX ADMIN — TRAZODONE HYDROCHLORIDE 50 MG: 100 TABLET ORAL at 20:43

## 2021-04-04 RX ADMIN — METHOCARBAMOL TABLETS 500 MG: 500 TABLET, COATED ORAL at 16:47

## 2021-04-04 RX ADMIN — MORPHINE SULFATE 15 MG: 15 TABLET, FILM COATED, EXTENDED RELEASE ORAL at 05:06

## 2021-04-04 RX ADMIN — OXYCODONE 5 MG: 5 TABLET ORAL at 20:42

## 2021-04-04 RX ADMIN — DIVALPROEX SODIUM 125 MG: 125 CAPSULE ORAL at 14:07

## 2021-04-04 RX ADMIN — DIVALPROEX SODIUM 125 MG: 125 CAPSULE ORAL at 23:54

## 2021-04-04 RX ADMIN — OXYCODONE 5 MG: 5 TABLET ORAL at 12:25

## 2021-04-04 RX ADMIN — RISPERIDONE 0.5 MG: 0.5 TABLET ORAL at 05:06

## 2021-04-04 RX ADMIN — CYCLOBENZAPRINE 10 MG: 10 TABLET, FILM COATED ORAL at 02:39

## 2021-04-04 RX ADMIN — LIDOCAINE 1 PATCH: 50 PATCH CUTANEOUS at 12:23

## 2021-04-04 RX ADMIN — AMLODIPINE BESYLATE 5 MG: 5 TABLET ORAL at 05:06

## 2021-04-04 RX ADMIN — DOCUSATE SODIUM 50 MG AND SENNOSIDES 8.6 MG 2 TABLET: 8.6; 5 TABLET, FILM COATED ORAL at 05:06

## 2021-04-04 RX ADMIN — CYCLOBENZAPRINE 10 MG: 10 TABLET, FILM COATED ORAL at 23:54

## 2021-04-04 RX ADMIN — METHOCARBAMOL TABLETS 500 MG: 500 TABLET, COATED ORAL at 20:42

## 2021-04-04 RX ADMIN — DIVALPROEX SODIUM 125 MG: 125 CAPSULE ORAL at 05:06

## 2021-04-04 RX ADMIN — GABAPENTIN 300 MG: 300 CAPSULE ORAL at 16:47

## 2021-04-04 RX ADMIN — METHOCARBAMOL TABLETS 500 MG: 500 TABLET, COATED ORAL at 12:23

## 2021-04-04 RX ADMIN — METHOCARBAMOL TABLETS 500 MG: 500 TABLET, COATED ORAL at 05:06

## 2021-04-04 RX ADMIN — MORPHINE SULFATE 15 MG: 15 TABLET, FILM COATED, EXTENDED RELEASE ORAL at 16:47

## 2021-04-04 RX ADMIN — GABAPENTIN 300 MG: 300 CAPSULE ORAL at 12:23

## 2021-04-04 ASSESSMENT — PAIN DESCRIPTION - PAIN TYPE
TYPE: CHRONIC PAIN

## 2021-04-04 NOTE — PROGRESS NOTES
Pt states that he is having back pain that is going out of control. Notified Nice APRN. Waiting to hear back. Scheduled and prn pain medications given

## 2021-04-04 NOTE — PROGRESS NOTES
Mobility Progress Note    Surgery patient: No  Date of surgery: n/a  Ambulated 50 ft on day of surgery? (N/A if patient did not undergo surgery today): N/a  Number of times ambulated 50 feet or greater today: > 2  Patient has been up to chair, edge of bed or HOB 90 degrees for all meals?: Yes  Goal met? (goal is ambulating at least 50 feet 2 times on day shift, one time on night shift): Yes  If patient did not meet mobility goal, why?: N/a

## 2021-04-04 NOTE — CARE PLAN
Problem: Safety  Goal: Will remain free from falls  Description: Pt mobilizes frequently independently.   Outcome: PROGRESSING AS EXPECTED   Ambulates independently with steady gait    Problem: Venous Thromboembolism (VTW)/Deep Vein Thrombosis (DVT) Prevention:  Goal: Patient will participate in Venous Thrombosis (VTE)/Deep Vein Thrombosis (DVT)Prevention Measures  Outcome: PROGRESSING AS EXPECTED   Ambulates frequently    Problem: Pain Management  Goal: Pain level will decrease to patient's comfort goal  Outcome: PROGRESSING AS EXPECTED   Pain well controlled with oral medications

## 2021-04-04 NOTE — PROGRESS NOTES
Hospital Medicine TWICE WEEKLY Progress Note    Date of Service  4/4/2021    Chief Complaint  65 y.o. male admitted 8/7/2020 with wound infection    Hospital Course  Mr. Varela is a 65-year-old male with PMH alcohol dependence, tobacco dependence, psychiatric disorder, and HTN who presented to the emergency department 8/7/2020 with a left lower extremity wound infection. He was hospitalized at our facility in April and evaluated by orthopedic surgery who recommended wound care. Wound cultures at that time grew MSSA and Streptococcus. He had been seen at  earlier that week and prescribed antibiotics, however he had not filled the prescription.  An ultrasound of that extremity was done and was negative for DVT.  He was treated for sepsis and completed an antibiotic course on 9/16/2020. He was also found to have head lice and underwent treatment for that.  A repeat wound culture was done on 9/28/2020 and grew Strep A and Proteus. Infectious disease was consulted and recommended a 5-day course of IV zosyn which was completed on 10/5/2020. On 10/12/2020 the wound care team noticed increased inflammation to the proximal part of the wound bed and switched from a vera flow wound VAC to a regular wound VAC.  ID was again consulted and on 10/14/2020 recommended to 7-day course of antibiotics with meropenem which was completed on 10/22/2020. Additionally, he was noted to have intermittent agitation so was started on risperdal, depakote, and gabapentin per psychiatric recommendations.  On 11/20/2020 he underwent left lower extremity debridement with wound VAC placement. Since then he has remained stable.  He has been deemed incapacitated to make medical decisions and guardianship was granted on 1/29/21. Placement will likely be in a group home.       Interval Problem Update  4/1/2021  -Patient seen and examined.  Patient is pleasant and cooperative.  Patient denies any pain or discomfort at this time.   "Patient is oriented to self only.  Discussed with patient diagnosis, events leading to his hospitalization, and plan of care.  -Plan of care: Monitor safety; continue supportive care; monitor wound and any signs of infection; pending guardianship in process, patient likely will need placement.  -Lab work: Reviewed; last obtained on 3/18/2021 -unremarkable; we will order labs as warranted  -VSS at this time    4/4/2021  -Patient seen and examined.  Patient normally seen walking around with regular clothing and refuses to wear hospital gown; wound VAC in place; patient reports improvement and denies any pain or discomfort; patient also wants me to call him \"Brandamore\" today as he claims that is his nickname growing up.  Patient given an update in plan of care.  -Plan of care: Continue supportive care; Monitor for safety; continue wound care monitor wound VAC; pending guardianship, and placement.  -Lab work: Reviewed; last obtained on 3/18/2021 -unremarkable; we will order labs as warranted  -VSS at this time  -There are no significant changes from a previous ROS/PE, please see my previous note for further details.    ==========================================================================================  BARBARA ODOM, MSN, APRN, FNP-C, certify that the patient requires continued medically necessary hospital services for the treatment of cognitive impairment and will need long-term placement. The patient will remain in the hospital for the foreseeable future.  Discharge may or may not occur in the next 20 days due to ongoing discharge delays due to no medically acceptable discharge option.  ==========================================================================================    Consultants/Specialty  Wound Team  LPS  Psychiatry  Infectious Disease    Code Status  Full Code    Disposition  -Guardianship established on 1/29/2021.  Pending acceptance and placement, updating financials.    Review of " Systems  Review of Systems   Unable to perform ROS: Mental acuity        Physical Exam  Temp:  [36.2 °C (97.1 °F)-36.3 °C (97.4 °F)] 36.2 °C (97.1 °F)  Pulse:  [65-87] 65  Resp:  [18] 18  BP: (121-140)/(82-90) 140/82  SpO2:  [94 %-100 %] 100 %    Physical Exam  Vitals and nursing note reviewed.   HENT:      Head: Normocephalic.      Nose: Nose normal.      Mouth/Throat:      Mouth: Mucous membranes are moist.   Eyes:      Pupils: Pupils are equal, round, and reactive to light.   Cardiovascular:      Rate and Rhythm: Normal rate and regular rhythm.      Pulses: Normal pulses.      Heart sounds: Normal heart sounds.   Pulmonary:      Effort: Pulmonary effort is normal.      Breath sounds: Normal breath sounds.   Abdominal:      General: Bowel sounds are normal.      Palpations: Abdomen is soft.   Musculoskeletal:         General: Tenderness present. Normal range of motion.      Right lower leg: Edema present.      Left lower leg: Edema present.   Skin:     General: Skin is dry.      Capillary Refill: Capillary refill takes 2 to 3 seconds.   Neurological:      Mental Status: He is alert. Mental status is at baseline.         Fluids  No intake or output data in the 24 hours ending 04/04/21 1030    Laboratory                        Imaging  DX-CHEST-LIMITED (1 VIEW)   Final Result      No evidence of acute cardiopulmonary process.      IR-US GUIDED PIV   Final Result    Ultrasound-guided PERIPHERAL IV INSERTION performed by    qualified nursing staff as above.      IR-MIDLINE CATHETER INSERTION WO GUIDANCE > AGE 3   Final Result                  Ultrasound-guided midline placement performed by qualified nursing staff    as above.          IR-US GUIDED PIV   Final Result    Ultrasound-guided PERIPHERAL IV INSERTION performed by    qualified nursing staff as above.      IR-MIDLINE CATHETER INSERTION WO GUIDANCE > AGE 3   Final Result                  Ultrasound-guided midline placement performed by qualified nursing staff   "  as above.          US-KOSTAS SINGLE LEVEL BILAT   Final Result      CT-HEAD W/O   Final Result      No acute intracranial abnormality      DX-TIBIA AND FIBULA LEFT   Final Result      1.  Healed distal metaphyseal fractures of the left fibula and tibia with tibial IM layla in place.      2.  No acute fracture or dislocation.      3.  No radiopaque soft tissue foreign body.      DX-FEMUR-2+ LEFT   Final Result      1.  No radiographic evidence of acute traumatic injury left femur.      2.  Unchanged cortical thickening in the posterior aspect of the distal femoral diaphysis which may represent sequela of prior injury or infection.      3.  No soft tissue foreign body identified.      US-EXTREMITY VENOUS LOWER UNILAT LEFT   Final Result      DX-CHEST-PORTABLE (1 VIEW)   Final Result      No acute cardiopulmonary abnormality.           Assessment/Plan  * Wound of left leg- (present on admission)  Assessment & Plan  -Wound vac - he intermittently dislodges. Requires ongoing education  -Further management per wound care/LPS/ortho.   -Pain control. Long acting pain medications effective. continue as needed at lower dose.  -Improving slowly overall, however most recent wound care note states it is \"failing to contract\" and \"odor\"  - repeat wound culture not growing organisms, follow  -CBC not indicative of infection  Stable    Encephalopathy- (present on admission)  Assessment & Plan  -Improved   -Per psychiatry and Dr. Velázquez on 1/11: Patient is incapacitated.  -Public Guardian: Coretta Jenkins   -Attempting placement; possibly [facility]    Psychiatric disorder- (present on admission)  Assessment & Plan  -Unspecified.  -Continue Risperdal and Depakote.  Stable on current treatment.  -Psychiatry has consulted and deemed him incapacitated to leave [place] or make medical decisions.  -Initial cognitive evaluation 8/20.   -SLP repeated Cognitive eval 1/2021- continue to recommend 24/7 supervision   -Public Guardian: Coretta Benjamin [phone]  -Pending ; obtaining financial status  Stable    Essential hypertension- (present on admission)  Assessment & Plan  -Well controlled on amlodipine    Emphysema/COPD (HCC)- (present on admission)  Assessment & Plan  -Chronic and stable off medication, without acute exacerbation    Protein malnutrition (HCC)- (present on admission)  Assessment & Plan  -Body mass index is 21.35 kg/m².   -Continue TID supplements.  -Encourage PO intake  Improving    Flexion contracture of knee, 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)  Assessment & Plan  -Secondary to chronic wound in that area.  -PT/OT.  -Does not seem to limit his mobility. Is frequently ambulatory.  -Continue PRN flexeril . Robaxin added 3/14   Encourage ambulation    Infection of wound due to methicillin resistant Staphylococcus aureus (MRSA)- (present on admission)  Assessment & Plan  -History of MRSA.  -Poor compliance with OP wound care. Poor compliance with wound vac at times.   -Continue pain control as needed.  -LPS and wound care following - debridements and wound VAC changes per their recommendations  -Cultures repeated 12/14 - positive for Proteus species, pan sensitive.  Completed regimen of augmentin.   -3/18 wound care team reports odor, repeat wound culture so far negative  Likely resolved       VTE prophylaxis: Lovenox    ========================================================================================  Please note that this dictation was created using voice recognition software. I have made every reasonable attempt to correct obvious errors, but there may be errors of grammar and possibly content that I did not discover before finalizing the note.    Electronically signed by:  BARBARA Méndez, MSN, APRN, FNP-C  Hospitalist Services  [facility]  [phone]  [email or web address]  04/04/21     1031

## 2021-04-04 NOTE — CARE PLAN
Problem: Safety  Goal: Will remain free from injury  Outcome: PROGRESSING AS EXPECTED  Note: Safety precautions in place. Call light within reach. Bed is low and in locked position. Hourly rounding in place.       Problem: Pain Management  Goal: Pain level will decrease to patient's comfort goal  Outcome: PROGRESSING AS EXPECTED  Note: Oxycodone, Robaxin, Lidocaine patch administered per MAR  Pt is now comfortably sitting in bed

## 2021-04-04 NOTE — WOUND TEAM
[facility]  Inpatient Services  Wound and Skin Care Progress Note    Admission Date: 8/7/2020     Last order of IP CONSULT TO WOUND CARE was found on 9/1/2020 from Hospital Encounter on 8/7/2020     HPI, PMH, SH: Reviewed    Unit where seen by Wound Team: [id number]    WOUND CONSULT/FOLLOW UP RELATED TO:  Left leg scheduled negative pressure wound therapy (npwt) dressing change and 2 layer compression wrap change    OBJECTIVE: NPWT dressing and 2 layer compression wrap in place. Patient on pressure redistribution mattress, up self, ambulates frequently, turns self.    WOUND TYPE, LOCATION, CHARACTERISTICS (Pressure Injuries: location, stage, POA or date identified)      Negative Pressure Wound Therapy 11/20/20 Leg Lateral;Posterior;Medial Left (Active)   Vacuum Serial Number [id number] 03/01/21 1000   NPWT Pump Mode / Pressure Setting Ulta;Continuous;150 mmHg    Dressing Type Non-adherent;Medium;Black Foam (Regular)    Number of Foam Pieces Used 5    Canister Changed No    Output (mL) 50 mL    NEXT Dressing Change/Treatment Date 04/06/21    WOUND NURSE ONLY - Time Spent with Patient (mins) 120      Wound 11/19/20 Full Thickness Wound Leg Lateral;Posterior;Medial Left (Active)   Wound Image      04/02/21 1000   Site Assessment Red;Yellow    Periwound Assessment Clean;Dry;Intact;Scar tissue    Margins Attached edges;Defined edges    Closure Secondary intention;Adhesive bandage    Drainage Amount Small    Drainage Description Tan    Treatments Cleansed;Site care;Offloading;Compression    Wound Cleansing Approved Wound Cleanser    Periwound Protectant Skin Protectant Wipes to Periwound;Paste Ring;Drape    Dressing Cleansing/Solutions Not Applicable    Dressing Options Wound Vac;Compression Wrap Two Layer    Dressing Changed Changed    Dressing Status Clean;Dry;Intact    Dressing Change/Treatment Frequency Monday, Wednesday, Friday, and As Needed    NEXT Dressing Change/Treatment Date 04/07/21    NEXT Weekly  Photo (Inpatient Only) 04/07/21    Non-staged Wound Description Full thickness    Wound Length (cm) 12.6 cm    Wound Width (cm) 3 cm    Wound Depth (cm) 0.3 cm    Wound Surface Area (cm^2) 37.8 cm^2    Wound Volume (cm^3) 11.34 cm^3    Post-Procedure Length (cm) 15 cm    Post-Procedure Width (cm) 3.4 cm    Post-Procedure Depth (cm) 0.2 cm    Post-Procedure Surface Area (cm^2) 51 cm^2    Post-Procedure Volume (cm^3) 10.2 cm^3    Wound Healing % -11    Wound Bed Granulation (%) 100 %    Wound Bed Slough (%) 10 %    Wound Bed Granulation (%) - Post-Procedure 100 %    Tunneling (cm) 0 cm    Undermining (cm) 0 cm    Shape oval     Wound Odor Strong    Pulses Left;2+;DP;PT    Exposed Structures None    WOUND NURSE ONLY - Time Spent with Patient (mins) 90      Lab Values:    Lab Results   Component Value Date/Time    WBC 5.9 03/18/2021 11:09 AM    RBC 4.19 (L) 03/18/2021 11:09 AM    HEMOGLOBIN 11.2 (L) 03/18/2021 11:09 AM    HEMATOCRIT 34.2 (L) 03/18/2021 11:09 AM    CREACTPROT 1.07 (H) 08/12/2020 01:55 PM    SEDRATEWES 85 (H) 08/07/2020 01:20 PM    HBA1C 5.7 (H) 11/09/2018 06:30 PM      Culture Results show:  Recent Results (from the past 720 hour(s))   CULTURE WOUND W/ GRAM STAIN    Collection Time: 09/01/20  2:00 PM    Specimen: Left Leg; Wound   Result Value Ref Range    Significant Indicator POS (POS)     Source WND     Site LEFT LEG     Culture Result - (A)     Gram Stain Result       Moderate Gram positive cocci.  Moderate Gram positive rods.      Culture Result (A)      Beta Hemolytic Streptococcus group A  Moderate growth      Culture Result (A)      Methicillin Resistant Staphylococcus aureus  Moderate growth      Culture Result (A)      Diphtheroids  Moderate growth  Mixed morphologies.     KOSTAS: 9/20/21   Left.    Doppler waveforms of the common femoral artery are of high amplitude and    triphasic.    Doppler waveforms at the ankle are brisk and triphasic.    Ankle-brachial index is normal.    Unable to  obtained popliteal waveforms due to wound bandages.     Self Report / Pain Level: Pre-medicated with oxycodone PRN. Liquid and viscous lidocaine used.     INTERVENTIONS BY WOUND TEAM: INTERVENTIONS BY WOUND TEAM:  Performed standard wound care which includes appropriate positioning, dressing removal and non-selective debridement. Pictures and measurements obtained weekly if/when required.    Cleansed: Wound cleanser and gauze used to clean wound beds and periwound  Debridement: NA  Periwound: Cleansed with moist warm washcloth. Prepped with no sting and paste rings. Drape bridge up along thigh to bridge trac pad up out of compressed area.    Primary - Biologic (applied by APRN during last change), Adaptic to wound beds.   Lateral: One piece of half thickness cut to fit applied over wound. A 2nd piece of spiraled half thickness black foam up along lateral thigh. 3rd piece of half thickness circular black foam as button for trac pad. All secured with drape.   Medial: One piece of half thickness cut to fit over wound bed. Secured with drape. A second piece of half thickness circular black foam as button for trac pad. Trac pad applied. Suction initiated.     Secondary - Fenestrated mepilex around bilateral trac pads. Y-Connector used to connect trac pads to 1 vac. 2 layer compression wrap     MEASUREMENTS:     Pre-procedure:   Medial 13 x 3.5 x 0.1  Lateral 12.5 x 2.6 x 0.1    Post-procedure:   Medial 13.1 x 2.6 x 0.1  Lateral 12.5 x 4.5 x 0.1    Interdisciplinary consultation: Patient, Bedside RN (Beverly), Wound RN (Sapna)    EVALUATION / RATIONALE FOR TREATMENT:  4/4/21: Received call pt had pulled trac pad off. Whole dressing changed. Y connected wounds with 2 separate trac pads to each wound to decrease risk of loss of NPWT. Bridged lateral trac pad up along thigh to keep out of compressed area to ease replacement if pt pulls off and making it more difficult for pt to dislodge.     4/2/2021 - Debridement performed  by APRN as patient's wounds continue to fail to contract. Biologic in place to bilateral wound beds and will be applied by RAFITA Goldberg again next Friday 4/11/2021. During next vac change, adaptic to remain in place as it is protecting the biologic underneath.   3/30/21: Wound odor mild-moderate and wound bed bright red, now 100% granulation tissue.  Meredith-skin continues to be fragile/flakey; protected with hydrocolloid dressing to wound border.    3/26/21: Foul odor present at dressing removal, but significantly decreased after cleansing.  Continue current POC.  3/24/21 Wound bed red, with less drainage noted.  Strong, foul odor still present.  APRN to see pt regarding wound bed. Continue with current plan of care.   3/22/21: Area unchanged from previous dressing change.  Wound malodorous.  Meredith-skin dry and flaky at superior wound border, hydocolloid thin dressing placed.   3/13/21 Wounds failing to contract.  Will trial intermittent wound vac setting to encourage greater stimulation of cellular activity in wound bed.    3/10/2021: debrided wound, odor present, continue NPWT  3/6/12 area unchanged, odor persists  3/1/21: Lidocaine unavailable during this dressing change. Small amount of debridement completed with curette. Continued with silver powder for its antimicrobial properties.   2/27/21 skin bridge enlarging  2/24/2021: Excisional debridement by LPS APRN performed for rolled edges that were starting to form along the posterior and medial left leg wound. Moderate bleeding managed by administration of lidocaine injection. CSWD performed for slough. Wound bed is beefy red following debridement. Resume agata and silver foam to manage bioburden. Continue with CSWD with each dressing change.   2/22/2021: Rolled edges starting to form along posterior thigh wound bed - may require excisional debridement by provider. Minimal changes to actual wound bed. Continue CSWD, agata, and silver foam to manage bioburden.    2/19/21: minimal to no changes from last assessment. Continue CSWD, agata, and silver foam for bioburden.  2/17/2021: Periwound remains intact. Adherent slough noted over wound bed. Scant bleeding noted on medial wound following CSWD. Continue with Agata over wound beds to further stimulate epithelization. Continue with silver foam to manage bioburden.   2/12/2021:periwound intact does not appear macerated.  Moderate amount of drainage. Wound bed debrided.   Picture frame draped over periwound, three silver foams used.  Continue NPWT and two layer compression.    2/10/21: Periwound looks less denuded today. Did not place the arglaes powder to wound bed - switching to Agata to see if it will stimulate healing in his wound bed. Did not use hydrofiber to periwound but it is in the room for next visit in case it is needed. Ordered and used liquid lidocaine to remove vac dressing and lidocaine gel to the posterior proximal part of wound. Patient tolerated debridement well with lidocaine.   02/05/21: Meredith wound is denuded and wet, vac drape lifting under wrap, Aquacel to dry wet and denuded skin, no silver foam available this dressing change.  02/03/21: Measurements are smaller for both medial and posterior thigh.  Wound bed has granulation and non-viable slough.  Will continue to debride every time with wound VAC changes to decrease bioburden in wound bed.   02/01/21:  No change.  Wounds appear to be improving slowly.      01/29/2021: wounds flush with periwound, fully granulated with scant yellow to a few areas. Medial periwound intact, lateral periwound with some erythema. LPS APRN suggested that next time applying brava strips to any irritated periwound may help reduce irritation.   1/27/21: Restarted NPWT, meredith-wound looks better.   1/25/21: denuded tissue, vac vacation for until 1/27.  Re-assessment to place wound VAC to left LE.   1/21/21 area has decreased since last measurements.  Odor still present.  Meredith  skin compromised probably yeast infection under VAC drape.  Will hold VAC for 72 hours and re assess.  Nystatin to address yeast and dry out addison skin  1/15/21: position of leg may affect posterior thigh measurements.  Medial Leg wound area decreased.    01/10/2021: Wound vac replaced, patient accidentally removed vac and bedside RN was unable to repair.  Patient tolerated wound VAC placement. Patient had moderate ss with some green tinge drainage.   01/04/2021: 2 layer compression wrap re-applied to left LE due to ACE wrap leaving too much indentation to left LE.   12/31/2020 thigh wound much narrower.  Biofilm redeveloping and odor still present.   12/27/20: Wound bed granular and odor has lessen but still persists.   12/18/20 wound length decreased.  Odor persists.    12/14/2020: re-cultured wound bed.  12/11/2020 POD 23 Length of wounds has decreased, width has not changed.  Odor persists.  Pt continues to become angry with VAC when it limits his mobility.    12/7/2020 POD 19 odor persists, improved wound bed after debridement.  Possible bacterial vs yeast vs fungal infection.  Culture taken.  Nystatin to treat possible fungal infection, silver foam to decrease microbial population.    12/4/2020 POD# 16 odor worsening, more yellow tissue present, will add nystatin and silver foam next week and consider a culture  11/30 POD#11 granular buds visible to wound bed.  Same as prior.   11/27 POD#8 wound is odorous likely related to biologic dressing.  No overt signs of infection.  Granular buds are visible through adaptic.  Edges do not appear as thick as they were prior to last sx.  Continue with current treatment.    11/23: POD#4 s/p I&D of left LE wounds and biologic placed by Dr. Olvera on 11/19.  Wound bed is flatter and raised edges scar tissue seems to be gone.   11/14 Posterior thigh aspect of wound deteriorating, will increase frequency of treatment to keep biofilm down and hopefully avoid systemic abx.  Will  include thigh in compression wrap.  Will consider culture if improvement is not seen in the next few treatments.    11/10: APRN unavailable at this time.  Will re-schedule excisional debridement to next week.   11/5: Plan for debridement of scarred edges on Tues by LPS. Tendon exposed to posterior aspect of wound, behind knee. Continue with current dressing care.  10/29: Excisional debridement by Asaf ELLER of scar tissue along the proximal edge of the posterior knee and lateral thigh.  Lateral aspect of thigh is flatten.  Medial aspect of knee has thick scar tissue that was excisionally debrided by Asaf ELLER.  Fully granulated throughout the wound bed.   10/23 wound bed mostly granular, superficial in depth, progress has been slow with the wound VAC so will trial collagen product to encourage new tissue growth.    10/19: wound bed has improved in color and is fully granulated.  Addison-wound has improved, denudation has resolved. APRN continuing with serial weekly debridements as needed.   10/16: wound friable pt now on iv abx per ID.  Indurated edges addressed with drape and foam.  Addison wound likely has yeast infection - addressing with silver powder crusting  10/14: patient seen with Asaf ELLER, stopping veraflo instillation.  Starting silver powder and regular wound VAC to see if it will help with bioburden.  Possible colonization of bacteria.  ID involved.   10/12: Inflammation noted to the proximal part of the wound bed.  Will continue to monitor, possible vac break on Wednesday to help addison-skin inflammation.   10/7: Excisional debridement performed by Asaf ELLER. Will need to continue selective debridement with NPWT changes, and weekly excisional debridement with APRN to flatten out the scar tissue.  IV abx therapy ended 10/5.   10/5: Lateral wound still with some slough, both wounds smaller per measurements. Medial wound with all granular tissue.  9/30: Patient to start abx  therapy for 7 days course per Dr. Ellis.  Started dakin's instillation to veraflo  9/28: malodorous today, culture taken.    9/25: Odor noted, though unclear if from wound or pt, consider shower before next Vac change. Consider regular vac next change, wound granular and superficial.   9/23: Patient still awaiting guardianship for placement.   9/18: wound granulating nicely and responding well to current treatment.    9/16: Wound bed with granular tissue, edges are thick but appear better than previous assessments. . NPWT VF to encourage closure by secondary intention and manage drainage while encouraging oxygenation and granulation to the wound bed. 2 layer compression to assist in decrease of swelling and encourage blood flow to wounds. Wound team to follow up and change 3x/week.      Goals: Steady decrease in wound area and depth weekly.    NURSING PLAN OF CARE ORDERS (X):    Dressing changes: See Dressing Care orders: X  Skin care: See Skin Care orders:   Rectal tube care: See Rectal Tube Care orders:   Other orders:      WOUND TEAM PLAN OF CARE:   Dressing changes by wound team:        Follow up 3 times weekly:                NPWT change 3 times weekly:  X,  NPWT changes 3x/ weekly with 2 layer compression wrap.  Follow up 1-2 times weekly:      Follow up Bi-Monthly:                   Follow up as needed:       Other (explain):     Anticipated discharge plans:  LTACH:        SNF/Rehab: X - patient will need ongoing wound care for LLE upon discharge - 3x/weekly           Home Health Care:           Outpatient Wound Center:            Self Care:

## 2021-04-05 PROCEDURE — A9270 NON-COVERED ITEM OR SERVICE: HCPCS | Performed by: NURSE PRACTITIONER

## 2021-04-05 PROCEDURE — 700102 HCHG RX REV CODE 250 W/ 637 OVERRIDE(OP): Performed by: NURSE PRACTITIONER

## 2021-04-05 PROCEDURE — 770004 HCHG ROOM/CARE - ONCOLOGY PRIVATE *

## 2021-04-05 PROCEDURE — 700101 HCHG RX REV CODE 250: Performed by: NURSE PRACTITIONER

## 2021-04-05 RX ORDER — IBUPROFEN 600 MG/1
600 TABLET ORAL EVERY 6 HOURS PRN
Status: DISCONTINUED | OUTPATIENT
Start: 2021-04-05 | End: 2021-05-03 | Stop reason: HOSPADM

## 2021-04-05 RX ADMIN — GABAPENTIN 300 MG: 300 CAPSULE ORAL at 18:15

## 2021-04-05 RX ADMIN — METHOCARBAMOL TABLETS 500 MG: 500 TABLET, COATED ORAL at 21:30

## 2021-04-05 RX ADMIN — DOCUSATE SODIUM 50 MG AND SENNOSIDES 8.6 MG 2 TABLET: 8.6; 5 TABLET, FILM COATED ORAL at 18:15

## 2021-04-05 RX ADMIN — RISPERIDONE 0.5 MG: 0.5 TABLET ORAL at 04:47

## 2021-04-05 RX ADMIN — RISPERIDONE 1 MG: 1 TABLET ORAL at 18:14

## 2021-04-05 RX ADMIN — GABAPENTIN 300 MG: 300 CAPSULE ORAL at 04:47

## 2021-04-05 RX ADMIN — MORPHINE SULFATE 15 MG: 15 TABLET, FILM COATED, EXTENDED RELEASE ORAL at 18:14

## 2021-04-05 RX ADMIN — TRAZODONE HYDROCHLORIDE 50 MG: 100 TABLET ORAL at 21:30

## 2021-04-05 RX ADMIN — METHOCARBAMOL TABLETS 500 MG: 500 TABLET, COATED ORAL at 18:15

## 2021-04-05 RX ADMIN — AMLODIPINE BESYLATE 5 MG: 5 TABLET ORAL at 04:47

## 2021-04-05 RX ADMIN — MORPHINE SULFATE 15 MG: 15 TABLET, FILM COATED, EXTENDED RELEASE ORAL at 04:47

## 2021-04-05 RX ADMIN — METHOCARBAMOL TABLETS 500 MG: 500 TABLET, COATED ORAL at 11:17

## 2021-04-05 RX ADMIN — CYCLOBENZAPRINE 10 MG: 10 TABLET, FILM COATED ORAL at 16:12

## 2021-04-05 RX ADMIN — METHOCARBAMOL TABLETS 500 MG: 500 TABLET, COATED ORAL at 04:47

## 2021-04-05 RX ADMIN — IBUPROFEN 600 MG: 600 TABLET, FILM COATED ORAL at 16:11

## 2021-04-05 RX ADMIN — OXYCODONE 5 MG: 5 TABLET ORAL at 18:15

## 2021-04-05 RX ADMIN — DIVALPROEX SODIUM 125 MG: 125 CAPSULE ORAL at 21:30

## 2021-04-05 RX ADMIN — GABAPENTIN 300 MG: 300 CAPSULE ORAL at 11:17

## 2021-04-05 RX ADMIN — DIVALPROEX SODIUM 125 MG: 125 CAPSULE ORAL at 04:47

## 2021-04-05 RX ADMIN — OXYCODONE 5 MG: 5 TABLET ORAL at 11:17

## 2021-04-05 RX ADMIN — LIDOCAINE 1 PATCH: 50 PATCH CUTANEOUS at 11:18

## 2021-04-05 RX ADMIN — DIVALPROEX SODIUM 125 MG: 125 CAPSULE ORAL at 14:33

## 2021-04-05 ASSESSMENT — PAIN DESCRIPTION - PAIN TYPE
TYPE: CHRONIC PAIN
TYPE: ACUTE PAIN
TYPE: CHRONIC PAIN
TYPE: ACUTE PAIN
TYPE: ACUTE PAIN
TYPE: CHRONIC PAIN

## 2021-04-05 NOTE — DISCHARGE PLANNING
Anticipated Discharge Disposition: GH placement.    Action: Planning for GH placement. [facility] may do [facility] while awaiting guardian to sort through finances. Have requested order for GH placement from P[facility] to fax to guardian as requested last week. Will need to fax to guardian when obtained.    Barriers to Discharge: Pending financial access to guardian vs JAI while awaiting.    Plan: Fax GH order to guardian.

## 2021-04-05 NOTE — PROGRESS NOTES
Report called to accepting nurse. All belongings with patient. Glasses left at bedside but tubed in bubble wrap to unit.

## 2021-04-05 NOTE — CARE PLAN
Problem: Pain Management  Goal: Pain level will decrease to patient's comfort goal  Outcome: PROGRESSING AS EXPECTED  Note: Pt states 8/10 pain. Pain medication administered per MAR, pt states back pain is more tolerable today.      Problem: Mobility  Goal: Risk for activity intolerance will decrease  Outcome: PROGRESSING AS EXPECTED  Note: Pt ambulates freely and tolerates well.

## 2021-04-05 NOTE — CARE PLAN
Problem: Knowledge Deficit  Goal: Knowledge of the prescribed therapeutic regimen will improve  Outcome: PROGRESSING AS EXPECTED   The pt is educated on all of his medications.   Problem: Mobility  Goal: Risk for activity intolerance will decrease  Outcome: PROGRESSING AS EXPECTED   The pt is constantly walking in his room and in the hallways.

## 2021-04-06 PROCEDURE — A9270 NON-COVERED ITEM OR SERVICE: HCPCS | Performed by: NURSE PRACTITIONER

## 2021-04-06 PROCEDURE — 700102 HCHG RX REV CODE 250 W/ 637 OVERRIDE(OP): Performed by: NURSE PRACTITIONER

## 2021-04-06 PROCEDURE — 770004 HCHG ROOM/CARE - ONCOLOGY PRIVATE *

## 2021-04-06 PROCEDURE — 700101 HCHG RX REV CODE 250: Performed by: NURSE PRACTITIONER

## 2021-04-06 RX ADMIN — OXYCODONE 5 MG: 5 TABLET ORAL at 00:08

## 2021-04-06 RX ADMIN — CYCLOBENZAPRINE 10 MG: 10 TABLET, FILM COATED ORAL at 10:22

## 2021-04-06 RX ADMIN — METHOCARBAMOL TABLETS 500 MG: 500 TABLET, COATED ORAL at 12:14

## 2021-04-06 RX ADMIN — OXYCODONE 5 MG: 5 TABLET ORAL at 08:34

## 2021-04-06 RX ADMIN — DOCUSATE SODIUM 50 MG AND SENNOSIDES 8.6 MG 2 TABLET: 8.6; 5 TABLET, FILM COATED ORAL at 06:10

## 2021-04-06 RX ADMIN — DIVALPROEX SODIUM 125 MG: 125 CAPSULE ORAL at 21:51

## 2021-04-06 RX ADMIN — DIVALPROEX SODIUM 125 MG: 125 CAPSULE ORAL at 06:10

## 2021-04-06 RX ADMIN — AMLODIPINE BESYLATE 5 MG: 5 TABLET ORAL at 06:10

## 2021-04-06 RX ADMIN — DOCUSATE SODIUM 50 MG AND SENNOSIDES 8.6 MG 2 TABLET: 8.6; 5 TABLET, FILM COATED ORAL at 17:23

## 2021-04-06 RX ADMIN — IBUPROFEN 600 MG: 600 TABLET, FILM COATED ORAL at 01:35

## 2021-04-06 RX ADMIN — GABAPENTIN 300 MG: 300 CAPSULE ORAL at 17:23

## 2021-04-06 RX ADMIN — LIDOCAINE 1 PATCH: 50 PATCH CUTANEOUS at 12:14

## 2021-04-06 RX ADMIN — DIVALPROEX SODIUM 125 MG: 125 CAPSULE ORAL at 14:35

## 2021-04-06 RX ADMIN — METHOCARBAMOL TABLETS 500 MG: 500 TABLET, COATED ORAL at 17:24

## 2021-04-06 RX ADMIN — RISPERIDONE 1 MG: 1 TABLET ORAL at 17:24

## 2021-04-06 RX ADMIN — MORPHINE SULFATE 15 MG: 15 TABLET, FILM COATED, EXTENDED RELEASE ORAL at 17:24

## 2021-04-06 RX ADMIN — GABAPENTIN 300 MG: 300 CAPSULE ORAL at 06:10

## 2021-04-06 RX ADMIN — RISPERIDONE 0.5 MG: 0.5 TABLET ORAL at 06:10

## 2021-04-06 RX ADMIN — TRAZODONE HYDROCHLORIDE 50 MG: 100 TABLET ORAL at 21:51

## 2021-04-06 RX ADMIN — CYCLOBENZAPRINE 10 MG: 10 TABLET, FILM COATED ORAL at 17:24

## 2021-04-06 RX ADMIN — MORPHINE SULFATE 15 MG: 15 TABLET, FILM COATED, EXTENDED RELEASE ORAL at 06:10

## 2021-04-06 RX ADMIN — GABAPENTIN 300 MG: 300 CAPSULE ORAL at 12:14

## 2021-04-06 RX ADMIN — IBUPROFEN 600 MG: 600 TABLET, FILM COATED ORAL at 17:23

## 2021-04-06 RX ADMIN — METHOCARBAMOL TABLETS 500 MG: 500 TABLET, COATED ORAL at 21:51

## 2021-04-06 RX ADMIN — IBUPROFEN 600 MG: 600 TABLET, FILM COATED ORAL at 10:22

## 2021-04-06 RX ADMIN — METHOCARBAMOL TABLETS 500 MG: 500 TABLET, COATED ORAL at 06:11

## 2021-04-06 RX ADMIN — OXYCODONE 5 MG: 5 TABLET ORAL at 14:35

## 2021-04-06 ASSESSMENT — PAIN DESCRIPTION - PAIN TYPE
TYPE: ACUTE PAIN

## 2021-04-06 NOTE — CARE PLAN
Problem: Safety  Goal: Will remain free from falls  Description: Pt mobilizes frequently independently.   Outcome: PROGRESSING AS EXPECTED  Note: Call light within reach, bed in the low and locked position, patient wearing non-slip socks, and rounded on hourly.       Problem: Pain Management  Goal: Pain level will decrease to patient's comfort goal  Outcome: PROGRESSING AS EXPECTED  Note: Patient medicated for pain per MAR.

## 2021-04-06 NOTE — CARE PLAN
Problem: Venous Thromboembolism (VTW)/Deep Vein Thrombosis (DVT) Prevention:  Goal: Patient will participate in Venous Thrombosis (VTE)/Deep Vein Thrombosis (DVT)Prevention Measures  Outcome: PROGRESSING AS EXPECTED  Intervention: Encourage ambulation/mobilization at level directed by Physical Therapy in collaboration with Interdisciplinary Team  Note: Pt ambulates frequently     Problem: Mobility  Goal: Risk for activity intolerance will decrease  Outcome: PROGRESSING AS EXPECTED  Intervention: Encourage patient to increase activity level in collaboration with Interdisciplinary Team  Note: Pt is ambulatory, he is up self and ambulates around his room frequently.

## 2021-04-07 PROCEDURE — A9270 NON-COVERED ITEM OR SERVICE: HCPCS | Performed by: NURSE PRACTITIONER

## 2021-04-07 PROCEDURE — 700102 HCHG RX REV CODE 250 W/ 637 OVERRIDE(OP): Performed by: NURSE PRACTITIONER

## 2021-04-07 PROCEDURE — 770004 HCHG ROOM/CARE - ONCOLOGY PRIVATE *

## 2021-04-07 PROCEDURE — 302098 PASTE RING (FLAT): Performed by: NURSE PRACTITIONER

## 2021-04-07 PROCEDURE — 86480 TB TEST CELL IMMUN MEASURE: CPT

## 2021-04-07 PROCEDURE — 700101 HCHG RX REV CODE 250: Performed by: NURSE PRACTITIONER

## 2021-04-07 PROCEDURE — 36415 COLL VENOUS BLD VENIPUNCTURE: CPT

## 2021-04-07 RX ADMIN — GABAPENTIN 300 MG: 300 CAPSULE ORAL at 12:19

## 2021-04-07 RX ADMIN — MORPHINE SULFATE 15 MG: 15 TABLET, FILM COATED, EXTENDED RELEASE ORAL at 17:29

## 2021-04-07 RX ADMIN — DIVALPROEX SODIUM 125 MG: 125 CAPSULE ORAL at 20:45

## 2021-04-07 RX ADMIN — TRAZODONE HYDROCHLORIDE 50 MG: 100 TABLET ORAL at 20:45

## 2021-04-07 RX ADMIN — OXYCODONE 5 MG: 5 TABLET ORAL at 14:44

## 2021-04-07 RX ADMIN — DIVALPROEX SODIUM 125 MG: 125 CAPSULE ORAL at 04:31

## 2021-04-07 RX ADMIN — AMLODIPINE BESYLATE 5 MG: 5 TABLET ORAL at 04:32

## 2021-04-07 RX ADMIN — GABAPENTIN 300 MG: 300 CAPSULE ORAL at 17:28

## 2021-04-07 RX ADMIN — LIDOCAINE 1 PATCH: 50 PATCH CUTANEOUS at 12:20

## 2021-04-07 RX ADMIN — METHOCARBAMOL TABLETS 500 MG: 500 TABLET, COATED ORAL at 20:44

## 2021-04-07 RX ADMIN — METHOCARBAMOL TABLETS 500 MG: 500 TABLET, COATED ORAL at 04:32

## 2021-04-07 RX ADMIN — METHOCARBAMOL TABLETS 500 MG: 500 TABLET, COATED ORAL at 17:30

## 2021-04-07 RX ADMIN — HYDROXYZINE HYDROCHLORIDE 25 MG: 50 TABLET, FILM COATED ORAL at 11:01

## 2021-04-07 RX ADMIN — DIVALPROEX SODIUM 125 MG: 125 CAPSULE ORAL at 14:44

## 2021-04-07 RX ADMIN — DOCUSATE SODIUM 50 MG AND SENNOSIDES 8.6 MG 2 TABLET: 8.6; 5 TABLET, FILM COATED ORAL at 04:31

## 2021-04-07 RX ADMIN — METHOCARBAMOL TABLETS 500 MG: 500 TABLET, COATED ORAL at 12:19

## 2021-04-07 RX ADMIN — RISPERIDONE 0.5 MG: 0.5 TABLET ORAL at 04:31

## 2021-04-07 RX ADMIN — DOCUSATE SODIUM 50 MG AND SENNOSIDES 8.6 MG 2 TABLET: 8.6; 5 TABLET, FILM COATED ORAL at 17:29

## 2021-04-07 RX ADMIN — MORPHINE SULFATE 15 MG: 15 TABLET, FILM COATED, EXTENDED RELEASE ORAL at 04:30

## 2021-04-07 RX ADMIN — RISPERIDONE 1 MG: 1 TABLET ORAL at 17:29

## 2021-04-07 RX ADMIN — GABAPENTIN 300 MG: 300 CAPSULE ORAL at 04:31

## 2021-04-07 ASSESSMENT — COGNITIVE AND FUNCTIONAL STATUS - GENERAL
SUGGESTED CMS G CODE MODIFIER DAILY ACTIVITY: CH
SUGGESTED CMS G CODE MODIFIER MOBILITY: CH
MOBILITY SCORE: 24
DAILY ACTIVITIY SCORE: 24

## 2021-04-07 ASSESSMENT — PAIN DESCRIPTION - PAIN TYPE
TYPE: ACUTE PAIN
TYPE: ACUTE PAIN;CHRONIC PAIN
TYPE: ACUTE PAIN

## 2021-04-07 NOTE — DISCHARGE PLANNING
Anticipated Discharge Disposition: Group Home with Home Health, [facility] with [facility] until patient's financials are worked out with his court appointed guardianCoretta.     Action: This RN,  spoke with court appointed Coretta nava, P: [phone] who requested patient's medical records for review. Guardian also requested a home health order, TB testing. This RN,  asked Provider, Cassia Ohara for the quantiferon and home health orders. Choice document faxed to Keira QUINTANILLA for [facility] HH agency. Patient's monthly income: 722.00, SSI per court documents, in chart.      Barriers to Discharge:  Accepting Group Home, leadership to approve JAI, negative quantiferon, home health acceptance.     Plan:  to follow up with quantiferon lab results, fax to elijah Grady, F: [phone], follow up with Guardian on Group Home acceptance, complete an JAI for group home, home health acceptance with [facility].     1336: Order, referral not placed as of now for home health, will continue to follow up with Provider for orders.     1432: Fax to Coretta nava, F: [phone] was undeliverable, this RN,  left a voice message for guardian at P: [phone] asking to call back with an alternate fax number or e-mail to send medical records to.     Lanie Lynn RN,

## 2021-04-07 NOTE — FACE TO FACE
Face to Face Supporting Documentation - Home Health    The encounter with this patient was in whole or in part the primary reason for home health admission.    Date of encounter:   Patient:                    MRN:                       YOB: 2022  Bola Varela  [id number]  1955     Home health to see patient for:  Skilled Nursing care for assessment, interventions & education and Wound Care    Skilled need for:  Surgical Aftercare Wound to LLE    Skilled nursing interventions to include:  Wound Care    Homebound status evidenced by:  Needs the assistance of another person in order to leave the home. Leaving home requires a considerable and taxing effort. There is a normal inability to leave the home.    Community Physician to provide follow up care: No primary care provider on file.     Optional Interventions? No      I certify the face to face encounter for this home health care referral meets the CMS requirements and the encounter/clinical assessment with the patient was, in whole, or in part, for the medical condition(s) listed above, which is the primary reason for home health care. Based on my clinical findings: the service(s) are medically necessary, support the need for home health care, and the homebound criteria are met.  I certify that this patient has had a face to face encounter by myself.  Cassia Ohara A.P.R.N. - NPI: [id number]

## 2021-04-07 NOTE — CARE PLAN
Problem: Safety  Goal: Will remain free from falls  Description: Pt mobilizes frequently independently.   Outcome: PROGRESSING AS EXPECTED  Note: Fall risk education provided, patient verbalizes understanding. Patient ambulates in the leiva frequently. Call light and belongings in reach. Hourly rounding in place.      Problem: Knowledge Deficit  Goal: Knowledge of the prescribed therapeutic regimen will improve  Outcome: PROGRESSING SLOWER THAN EXPECTED  Intervention: Discuss information regarding therpeutic regimen and document in education  Note: POC discussed.

## 2021-04-08 PROBLEM — G93.40 ENCEPHALOPATHY: Status: RESOLVED | Noted: 2020-08-09 | Resolved: 2021-04-08

## 2021-04-08 PROBLEM — A49.02 INFECTION OF WOUND DUE TO METHICILLIN RESISTANT STAPHYLOCOCCUS AUREUS (MRSA): Status: RESOLVED | Noted: 2018-08-23 | Resolved: 2021-04-08

## 2021-04-08 LAB
GAMMA INTERFERON BACKGROUND BLD IA-ACNC: 0.05 IU/ML
M TB IFN-G BLD-IMP: NEGATIVE
M TB IFN-G CD4+ BCKGRND COR BLD-ACNC: 0 IU/ML
MITOGEN IGNF BCKGRD COR BLD-ACNC: >10 IU/ML
QFT TB2 - NIL TBQ2: 0.01 IU/ML

## 2021-04-08 PROCEDURE — A9270 NON-COVERED ITEM OR SERVICE: HCPCS | Performed by: NURSE PRACTITIONER

## 2021-04-08 PROCEDURE — 99231 SBSQ HOSP IP/OBS SF/LOW 25: CPT | Performed by: NURSE PRACTITIONER

## 2021-04-08 PROCEDURE — 700102 HCHG RX REV CODE 250 W/ 637 OVERRIDE(OP): Performed by: NURSE PRACTITIONER

## 2021-04-08 PROCEDURE — 770004 HCHG ROOM/CARE - ONCOLOGY PRIVATE *

## 2021-04-08 PROCEDURE — 700101 HCHG RX REV CODE 250: Performed by: NURSE PRACTITIONER

## 2021-04-08 RX ORDER — CALCIUM CARBONATE 500 MG/1
500 TABLET, CHEWABLE ORAL 4 TIMES DAILY PRN
Status: DISCONTINUED | OUTPATIENT
Start: 2021-04-08 | End: 2021-05-03 | Stop reason: HOSPADM

## 2021-04-08 RX ADMIN — METHOCARBAMOL TABLETS 500 MG: 500 TABLET, COATED ORAL at 21:43

## 2021-04-08 RX ADMIN — MORPHINE SULFATE 15 MG: 15 TABLET, FILM COATED, EXTENDED RELEASE ORAL at 17:16

## 2021-04-08 RX ADMIN — GABAPENTIN 300 MG: 300 CAPSULE ORAL at 05:26

## 2021-04-08 RX ADMIN — OXYCODONE 5 MG: 5 TABLET ORAL at 15:58

## 2021-04-08 RX ADMIN — AMLODIPINE BESYLATE 5 MG: 5 TABLET ORAL at 05:27

## 2021-04-08 RX ADMIN — DIVALPROEX SODIUM 125 MG: 125 CAPSULE ORAL at 14:04

## 2021-04-08 RX ADMIN — LIDOCAINE 1 PATCH: 50 PATCH CUTANEOUS at 11:48

## 2021-04-08 RX ADMIN — RISPERIDONE 1 MG: 1 TABLET ORAL at 17:16

## 2021-04-08 RX ADMIN — DOCUSATE SODIUM 50 MG AND SENNOSIDES 8.6 MG 2 TABLET: 8.6; 5 TABLET, FILM COATED ORAL at 05:26

## 2021-04-08 RX ADMIN — METHOCARBAMOL TABLETS 500 MG: 500 TABLET, COATED ORAL at 12:48

## 2021-04-08 RX ADMIN — ANTACID TABLETS 500 MG: 500 TABLET, CHEWABLE ORAL at 14:21

## 2021-04-08 RX ADMIN — GABAPENTIN 300 MG: 300 CAPSULE ORAL at 17:16

## 2021-04-08 RX ADMIN — DIVALPROEX SODIUM 125 MG: 125 CAPSULE ORAL at 05:26

## 2021-04-08 RX ADMIN — METHOCARBAMOL TABLETS 500 MG: 500 TABLET, COATED ORAL at 05:28

## 2021-04-08 RX ADMIN — DIVALPROEX SODIUM 125 MG: 125 CAPSULE ORAL at 21:43

## 2021-04-08 RX ADMIN — CYCLOBENZAPRINE 10 MG: 10 TABLET, FILM COATED ORAL at 17:16

## 2021-04-08 RX ADMIN — RISPERIDONE 0.5 MG: 0.5 TABLET ORAL at 05:27

## 2021-04-08 RX ADMIN — TRAZODONE HYDROCHLORIDE 50 MG: 100 TABLET ORAL at 21:43

## 2021-04-08 RX ADMIN — MORPHINE SULFATE 15 MG: 15 TABLET, FILM COATED, EXTENDED RELEASE ORAL at 05:25

## 2021-04-08 RX ADMIN — GABAPENTIN 300 MG: 300 CAPSULE ORAL at 11:47

## 2021-04-08 ASSESSMENT — ENCOUNTER SYMPTOMS
SHORTNESS OF BREATH: 0
FOCAL WEAKNESS: 0
SENSORY CHANGE: 0
DEPRESSION: 0
CONSTIPATION: 0
NAUSEA: 0
VOMITING: 0
WHEEZING: 0
COUGH: 0
DIARRHEA: 0
WEAKNESS: 0
FEVER: 0
NERVOUS/ANXIOUS: 0
ABDOMINAL PAIN: 0
INSOMNIA: 0
DIZZINESS: 0
HEADACHES: 0
SPEECH CHANGE: 0

## 2021-04-08 ASSESSMENT — PAIN DESCRIPTION - PAIN TYPE
TYPE: ACUTE PAIN

## 2021-04-08 NOTE — CARE PLAN
Problem: Safety  Goal: Will remain free from falls  Description: Pt mobilizes frequently independently.   Outcome: PROGRESSING AS EXPECTED  Note: Provided fall risk education to patient. Bed locked in lowest position. Call light in reach. Hourly rounding in place.      Problem: Venous Thromboembolism (VTW)/Deep Vein Thrombosis (DVT) Prevention:  Goal: Patient will participate in Venous Thrombosis (VTE)/Deep Vein Thrombosis (DVT)Prevention Measures  Outcome: PROGRESSING AS EXPECTED  Intervention: Encourage ambulation/mobilization at level directed by Physical Therapy in collaboration with Interdisciplinary Team  Note: Patient ambulates frequently in the leiva.

## 2021-04-08 NOTE — WOUND TEAM
[facility]  Inpatient Services  Wound and Skin Care Progress Note    Admission Date: 8/7/2020     Last order of IP CONSULT TO WOUND CARE was found on 9/1/2020 from Hospital Encounter on 8/7/2020     HPI, PMH, SH: Reviewed    Unit where seen by Wound Team: [id number]    WOUND CONSULT/FOLLOW UP RELATED TO:  Left leg scheduled negative pressure wound therapy (npwt) dressing change and 2 layer compression wrap change    OBJECTIVE: NPWT dressing and 2 layer compression wrap in place. Patient on pressure redistribution mattress, up self, ambulates frequently, turns self.    WOUND TYPE, LOCATION, CHARACTERISTICS (Pressure Injuries: location, stage, POA or date identified)     Negative Pressure Wound Therapy 11/20/20 Leg Lateral;Posterior;Medial Left (Active)   Vacuum Serial Number [id number]    NPWT Pump Mode / Pressure Setting Continuous;Ulta;150 mmHg    Dressing Type Non-adherent;Medium;Black Foam (Regular)    Number of Foam Pieces Used 4    Canister Changed No    Output (mL) 20 mL    NEXT Dressing Change/Treatment Date 04/09/21            Wound 11/19/20 Full Thickness Wound Leg Lateral;Posterior;Medial Left (Active)            Site Assessment Red;Pink    Periwound Assessment Intact    Margins Attached edges    Closure Secondary intention    Drainage Amount Scant    Drainage Description Serosanguineous    Treatments Site care    Wound Cleansing Approved Wound Cleanser    Periwound Protectant Paste Ring;Drape;Skin Protectant Wipes to Periwound;Benzoin    Dressing Cleansing/Solutions Not Applicable    Dressing Options Wound Vac;Compression Wrap Two Layer    Dressing Changed Changed    Dressing Status Intact    Dressing Change/Treatment Frequency Monday, Wednesday, Friday, and As Needed    NEXT Dressing Change/Treatment Date 04/09/21    NEXT Weekly Photo (Inpatient Only) 04/09/21    Non-staged Wound Description Full thickness    Wound Length (cm) 12.6 cm    Wound Width (cm) 3 cm    Wound Depth (cm) 0.3 cm    Wound  Surface Area (cm^2) 37.8 cm^2    Wound Volume (cm^3) 11.34 cm^3    Post-Procedure Length (cm) 15 cm    Post-Procedure Width (cm) 3.4 cm    Post-Procedure Depth (cm) 0.2 cm    Post-Procedure Surface Area (cm^2) 51 cm^2    Post-Procedure Volume (cm^3) 10.2 cm^3    Wound Healing % -11    Wound Bed Granulation (%) 100 %    Wound Bed Slough (%) 10 %    Wound Bed Granulation (%) - Post-Procedure 100 %    Tunneling (cm) 0 cm    Undermining (cm) 0 cm    Shape oval    Wound Odor Mild    Pulses Left;2+;DP;PT    Exposed Structures None           Lab Values:    Lab Results   Component Value Date/Time    WBC 5.9 03/18/2021 11:09 AM    RBC 4.19 (L) 03/18/2021 11:09 AM    HEMOGLOBIN 11.2 (L) 03/18/2021 11:09 AM    HEMATOCRIT 34.2 (L) 03/18/2021 11:09 AM    CREACTPROT 1.07 (H) 08/12/2020 01:55 PM    SEDRATEWES 85 (H) 08/07/2020 01:20 PM    HBA1C 5.7 (H) 11/09/2018 06:30 PM      Culture Results show:  Recent Results (from the past 720 hour(s))   CULTURE WOUND W/ GRAM STAIN    Collection Time: 09/01/20  2:00 PM    Specimen: Left Leg; Wound   Result Value Ref Range    Significant Indicator POS (POS)     Source WND     Site LEFT LEG     Culture Result - (A)     Gram Stain Result       Moderate Gram positive cocci.  Moderate Gram positive rods.      Culture Result (A)      Beta Hemolytic Streptococcus group A  Moderate growth      Culture Result (A)      Methicillin Resistant Staphylococcus aureus  Moderate growth      Culture Result (A)      Diphtheroids  Moderate growth  Mixed morphologies.     KOSTAS: 9/20/21   Left.    Doppler waveforms of the common femoral artery are of high amplitude and    triphasic.    Doppler waveforms at the ankle are brisk and triphasic.    Ankle-brachial index is normal.    Unable to obtained popliteal waveforms due to wound bandages.     Self Report / Pain Level: Pre-medicated with oxycodone PRN     INTERVENTIONS BY WOUND TEAM: INTERVENTIONS BY WOUND TEAM:  Performed standard wound care which includes  appropriate positioning, dressing removal and non-selective debridement. Pictures and measurements obtained weekly if/when required.    Cleansed: Wound cleanser and gauze used to clean wound beds and periwound  Debridement: NA  Periwound: Cleansed with cleanser and gauze. Prepped with no sting and paste rings. Drape bridge up along thigh to bridge trac pad up out of compressed area.    Primary - Biologic previously by LPS, Adaptic to wound beds.   Lateral: One piece of half thickness cut to fit applied over wound. A 2nd piece of spiraled half thickness black foam up along lateral thigh. 3rd piece of half thickness circular black foam as button for trac pad. All secured with drape.   Medial: One piece of half thickness cut to fit over wound bed. Secured with drape. A second piece of half thickness circular black foam as button for trac pad. Trac pad applied. Suction initiated.     Secondary - Fenestrated mepilex around bilateral trac pads. Y-Connector used to connect trac pads to 1 vac. 2 layer compression wrap applied.    MEASUREMENTS:     Pre-procedure:   Medial 13 x 3.5 x 0.1  Lateral 12.5 x 2.6 x 0.1    Post-procedure:   Medial 13.1 x 2.6 x 0.1  Lateral 12.5 x 4.5 x 0.1    Interdisciplinary consultation: Patient, Bedside RN, [place] wound RN    EVALUATION / RATIONALE FOR TREATMENT:  4/7: Biologic to be reapplied this week by LPS with possible debridement if deemed appropriate.  Wounds remain stable and appear to be to surface level. Y connect on tracpad still in use.     4/4/21: Received call pt had pulled trac pad off. Whole dressing changed. Y connected wounds with 2 separate trac pads to each wound to decrease risk of loss of NPWT. Bridged lateral trac pad up along thigh to keep out of compressed area to ease replacement if pt pulls off and making it more difficult for pt to dislodge.     4/2/2021 - Debridement performed by APRN as patient's wounds continue to fail to contract. Biologic in place to bilateral wound  beds and will be applied by RAFITA Goldberg again next Friday 4/11/2021. During next vac change, adaptic to remain in place as it is protecting the biologic underneath.   3/30/21: Wound odor mild-moderate and wound bed bright red, now 100% granulation tissue.  Meredith-skin continues to be fragile/flakey; protected with hydrocolloid dressing to wound border.    3/26/21: Foul odor present at dressing removal, but significantly decreased after cleansing.  Continue current POC.  3/24/21 Wound bed red, with less drainage noted.  Strong, foul odor still present.  APRN to see pt regarding wound bed. Continue with current plan of care.   3/22/21: Area unchanged from previous dressing change.  Wound malodorous.  Meerdith-skin dry and flaky at superior wound border, hydocolloid thin dressing placed.   3/13/21 Wounds failing to contract.  Will trial intermittent wound vac setting to encourage greater stimulation of cellular activity in wound bed.    3/10/2021: debrided wound, odor present, continue NPWT  3/6/12 area unchanged, odor persists  3/1/21: Lidocaine unavailable during this dressing change. Small amount of debridement completed with curette. Continued with silver powder for its antimicrobial properties.   2/27/21 skin bridge enlarging  2/24/2021: Excisional debridement by LPS APRN performed for rolled edges that were starting to form along the posterior and medial left leg wound. Moderate bleeding managed by administration of lidocaine injection. CSWD performed for slough. Wound bed is beefy red following debridement. Resume agata and silver foam to manage bioburden. Continue with CSWD with each dressing change.   2/22/2021: Rolled edges starting to form along posterior thigh wound bed - may require excisional debridement by provider. Minimal changes to actual wound bed. Continue CSWD, agata, and silver foam to manage bioburden.   2/19/21: minimal to no changes from last assessment. Continue CSWD, agata, and silver foam  for bioburden.       Goals: Steady decrease in wound area and depth weekly.    NURSING PLAN OF CARE ORDERS (X):    Dressing changes: See Dressing Care orders: X  Skin care: See Skin Care orders:   Rectal tube care: See Rectal Tube Care orders:   Other orders:      WOUND TEAM PLAN OF CARE:   Dressing changes by wound team:        Follow up 3 times weekly:                NPWT change 3 times weekly:  X,  NPWT changes 3x/ weekly with 2 layer compression wrap.  Follow up 1-2 times weekly:      Follow up Bi-Monthly:                   Follow up as needed:       Other (explain):     Anticipated discharge plans:  LTACH:        SNF/Rehab:       Home Health Care:           Outpatient Wound Center:            Self Care:           Other- GH, unsure if they can manage wound vac. May need to DC with compression wrap only. TBD.

## 2021-04-08 NOTE — CARE PLAN
Assumed day shift care at start of shift  Patient a+o x 1 - reoriented to place, time, and situation  denies pain  Vss, afebrile  Independent in room - steady gait  Wound vac to LLE  No needs at this time, wctm      Fall precautions/hourly rounding maintained, call light within reach and functioning, all items within reach.  Patient encouraged to call for assistance, poc reviewed with patient, ?'s/concerns answered.        Problem: Safety  Goal: Will remain free from injury  Outcome: PROGRESSING AS EXPECTED     Problem: Infection  Goal: Will remain free from infection  Outcome: PROGRESSING AS EXPECTED

## 2021-04-08 NOTE — CARE PLAN
Problem: Safety  Goal: Will remain free from injury  Outcome: PROGRESSING AS EXPECTED  Goal: Will remain free from falls  Description: Pt mobilizes frequently independently.   Outcome: PROGRESSING AS EXPECTED     Problem: Infection  Goal: Will remain free from infection  Outcome: PROGRESSING SLOWER THAN EXPECTED  Note: Wound vac in place and wound team managing left leg infection. No new S/S of infection.

## 2021-04-09 PROCEDURE — 15271 SKIN SUB GRAFT TRNK/ARM/LEG: CPT | Performed by: NURSE PRACTITIONER

## 2021-04-09 PROCEDURE — A9270 NON-COVERED ITEM OR SERVICE: HCPCS | Performed by: NURSE PRACTITIONER

## 2021-04-09 PROCEDURE — 700102 HCHG RX REV CODE 250 W/ 637 OVERRIDE(OP): Performed by: NURSE PRACTITIONER

## 2021-04-09 PROCEDURE — 306637 HCHG MISC ORTHO ITEM RC 0274

## 2021-04-09 PROCEDURE — C5272 LOW COST SKIN SUBSTITUTE APP: HCPCS

## 2021-04-09 PROCEDURE — C5271 LOW COST SKIN SUBSTITUTE APP: HCPCS

## 2021-04-09 PROCEDURE — 302098 PASTE RING (FLAT): Performed by: NURSE PRACTITIONER

## 2021-04-09 PROCEDURE — 700101 HCHG RX REV CODE 250: Performed by: NURSE PRACTITIONER

## 2021-04-09 PROCEDURE — 770001 HCHG ROOM/CARE - MED/SURG/GYN PRIV*

## 2021-04-09 PROCEDURE — 15272 SKIN SUB GRAFT T/A/L ADD-ON: CPT | Performed by: NURSE PRACTITIONER

## 2021-04-09 PROCEDURE — 99232 SBSQ HOSP IP/OBS MODERATE 35: CPT | Mod: 25 | Performed by: NURSE PRACTITIONER

## 2021-04-09 RX ADMIN — RISPERIDONE 0.5 MG: 0.5 TABLET ORAL at 06:32

## 2021-04-09 RX ADMIN — RISPERIDONE 1 MG: 1 TABLET ORAL at 18:21

## 2021-04-09 RX ADMIN — DIVALPROEX SODIUM 125 MG: 125 CAPSULE ORAL at 14:35

## 2021-04-09 RX ADMIN — METHOCARBAMOL TABLETS 500 MG: 500 TABLET, COATED ORAL at 11:40

## 2021-04-09 RX ADMIN — DIVALPROEX SODIUM 125 MG: 125 CAPSULE ORAL at 06:31

## 2021-04-09 RX ADMIN — HYDROXYZINE HYDROCHLORIDE 25 MG: 50 TABLET, FILM COATED ORAL at 01:50

## 2021-04-09 RX ADMIN — DIVALPROEX SODIUM 125 MG: 125 CAPSULE ORAL at 21:23

## 2021-04-09 RX ADMIN — LIDOCAINE 1 PATCH: 50 PATCH CUTANEOUS at 11:40

## 2021-04-09 RX ADMIN — DOCUSATE SODIUM 50 MG AND SENNOSIDES 8.6 MG 2 TABLET: 8.6; 5 TABLET, FILM COATED ORAL at 06:32

## 2021-04-09 RX ADMIN — GABAPENTIN 300 MG: 300 CAPSULE ORAL at 18:21

## 2021-04-09 RX ADMIN — MORPHINE SULFATE 15 MG: 15 TABLET, FILM COATED, EXTENDED RELEASE ORAL at 06:32

## 2021-04-09 RX ADMIN — TRAZODONE HYDROCHLORIDE 50 MG: 100 TABLET ORAL at 21:23

## 2021-04-09 RX ADMIN — OXYCODONE 5 MG: 5 TABLET ORAL at 08:08

## 2021-04-09 RX ADMIN — AMLODIPINE BESYLATE 5 MG: 5 TABLET ORAL at 06:32

## 2021-04-09 RX ADMIN — METHOCARBAMOL TABLETS 500 MG: 500 TABLET, COATED ORAL at 06:32

## 2021-04-09 RX ADMIN — MORPHINE SULFATE 15 MG: 15 TABLET, FILM COATED, EXTENDED RELEASE ORAL at 18:21

## 2021-04-09 RX ADMIN — METHOCARBAMOL TABLETS 500 MG: 500 TABLET, COATED ORAL at 18:21

## 2021-04-09 RX ADMIN — METHOCARBAMOL TABLETS 500 MG: 500 TABLET, COATED ORAL at 21:23

## 2021-04-09 RX ADMIN — GABAPENTIN 300 MG: 300 CAPSULE ORAL at 11:40

## 2021-04-09 RX ADMIN — GABAPENTIN 300 MG: 300 CAPSULE ORAL at 06:32

## 2021-04-09 RX ADMIN — DOCUSATE SODIUM 50 MG AND SENNOSIDES 8.6 MG 2 TABLET: 8.6; 5 TABLET, FILM COATED ORAL at 18:21

## 2021-04-09 RX ADMIN — CYCLOBENZAPRINE 10 MG: 10 TABLET, FILM COATED ORAL at 01:50

## 2021-04-09 ASSESSMENT — PAIN DESCRIPTION - PAIN TYPE: TYPE: ACUTE PAIN

## 2021-04-09 NOTE — DISCHARGE PLANNING
Spoke To: Twan  Agency/Facility Name: [facility]  Plan or Request: referral currently under review, pending acceptance. [facility] would like us to f/u with them on Monday.

## 2021-04-09 NOTE — CARE PLAN
Problem: Safety  Goal: Will remain free from injury  Outcome: PROGRESSING AS EXPECTED  Note: Pt remains free of injury during hospital stay. Continue to apply appropriate fall precautions based on risk and educate patient on general falls precautions, use of call light. Performing frequent rounding to assess and address patient's needs.  Goal: Will remain free from falls  Description: Pt mobilizes frequently independently.   Outcome: PROGRESSING AS EXPECTED     Problem: Knowledge Deficit  Goal: Knowledge of disease process/condition, treatment plan, diagnostic tests, and medications will improve  Outcome: PROGRESSING AS EXPECTED  Note: Updated patient on plan of care.   Goal: Knowledge of the prescribed therapeutic regimen will improve  Outcome: PROGRESSING AS EXPECTED     Problem: Pain Management  Goal: Pain level will decrease to patient's comfort goal  Outcome: PROGRESSING AS EXPECTED  Note: Assess pain level. Administer appropriate pain medication as ordered, reassess response. Continue to educate patient on purpose, expectations, non-pharmacologic ways to decrease pain. Pt reports pain is currently under control.      Problem: Mobility  Goal: Risk for activity intolerance will decrease  Outcome: PROGRESSING AS EXPECTED  Note: Patient up independently with steady gait. Reeducated patient about standard falls precaution

## 2021-04-09 NOTE — DISCHARGE PLANNING
Anticipated Discharge Disposition: Group Home with [facility] from [facility] until patient's financials are worked out with court appointed guardian, Coretta Grady. Home Health referral sent to [facility].     Action: This RN,  spoke with court appointed levonanCoretta, asking for e-mail address, b/c fax number was undeliverable.  I e-mailed guardian all clinicals that were requested on the 7th. Guardian's email: [email or web address]. Guardian stated that she should know this afternoon if a group home will accept patient.     Barriers to Discharge: Group Home acceptance, working with court appointed levonanCorettavirginie, P: [phone], [facility] for Group Home with [facility], home health acceptance.    Plan: Group Home acceptance, follow up with guardisree Grady, if she cannot find a group home by early next week, Case Management will need to find group home placement. Complete [facility] application, send to leadership for approval, set up transportation to group home, confirm [facility]  has accepted patient.     Lanie Lynn RN,

## 2021-04-09 NOTE — PROGRESS NOTES
Hospital Medicine Twice Weekly Progress Note    Date of Service  4/8/2021    Chief Complaint  LLE wound infection    Hospital Course  Mr. Varela is a 65-year-old male with a PMHx of alcohol dependence, tobacco dependence, psychiatric disorder, and HTN who presented to the emergency department on 8/7/2020 with a left lower extremity wound infection. He was hospitalized at our facility last April and was evaluated by orthopedic surgery who recommended ongoing wound care. Wound cultures at that time grew MSSA and Streptococcus. He had been seen at [facility] earlier that week and prescribed antibiotics, but never filled the prescription.  An ultrasound of that extremity was done and was negative for DVT.  He was treated for sepsis and completed an antibiotic course on 9/16/2020. He was also found to have head lice and underwent treatment for that.  A repeat wound culture was done on 9/28/2020 and grew Strep A and Proteus. Infectious disease was consulted and recommended a 5-day course of IV zosyn which was completed on 10/5/2020. On 10/12/2020 the wound care team noticed increased inflammation to the proximal part of the wound bed and switched from a vera flow wound VAC to a regular wound VAC.  ID was again consulted and on 10/14/2020 recommended a 7-day course of antibiotics with meropenem which was completed on 10/22/2020. Additionally, he was noted to have intermittent agitation so was started on risperdal, depakote, and gabapentin per psychiatry recommendations.  On 11/20/2020 he underwent left lower extremity debridement with wound VAC placement. Since then he has remained stable but has been deemed incapacitated to make medical decisions so guardianship was pursued and granted on 1/29/21. Placement will likely be to a group home with home health services.     Interval Problem Update  -Pain currently controlled. Wound vac in place. No issues to report overnight or over the last few days.      Consultants/Specialty  Orthopedic surgery  Infectious disease  Psychiatry    Code Status  Full Code    Disposition  Guardianship established. Possible group home with home health vs SNF.     Review of Systems  Review of Systems   Constitutional: Negative for fever and malaise/fatigue.   Respiratory: Negative for cough, shortness of breath and wheezing.    Cardiovascular: Positive for leg swelling (LLE). Negative for chest pain.   Gastrointestinal: Negative for abdominal pain, constipation, diarrhea, nausea and vomiting.   Genitourinary: Negative for dysuria, frequency and urgency.   Musculoskeletal:        No pain at time of exam   Neurological: Negative for dizziness, sensory change, speech change, focal weakness, weakness and headaches.   Psychiatric/Behavioral: Negative for depression. The patient is not nervous/anxious and does not have insomnia.    All other systems reviewed and are negative.     Physical Exam  Temp:  [36.2 °C (97.2 °F)-36.7 °C (98.1 °F)] 36.2 °C (97.2 °F)  Pulse:  [69-95] 73  Resp:  [18] 18  BP: ()/(68-81) 119/81  SpO2:  [90 %-92 %] 92 %    Physical Exam  Vitals and nursing note reviewed.   Constitutional:       General: He is awake. He is not in acute distress.     Appearance: Normal appearance. He is well-developed. He is ill-appearing (chronically ill appearing).   HENT:      Head: Normocephalic and atraumatic.      Mouth/Throat:      Lips: Pink.      Mouth: Mucous membranes are moist.   Eyes:      Conjunctiva/sclera: Conjunctivae normal.      Pupils: Pupils are equal, round, and reactive to light.   Cardiovascular:      Rate and Rhythm: Normal rate and regular rhythm.      Pulses: Normal pulses.      Heart sounds: Normal heart sounds.   Pulmonary:      Effort: Pulmonary effort is normal.      Breath sounds: Examination of the right-lower field reveals decreased breath sounds. Examination of the left-lower field reveals decreased breath sounds. Decreased breath sounds present.    Abdominal:      General: Bowel sounds are normal. There is no distension or abdominal bruit.      Palpations: Abdomen is soft.      Tenderness: There is no abdominal tenderness.   Musculoskeletal:      Cervical back: Normal range of motion and neck supple.      Right lower leg: No edema.      Left lower leg: No edema.   Skin:     General: Skin is warm and dry.          Neurological:      General: No focal deficit present.      Mental Status: He is alert and oriented to person, place, and time.      Cranial Nerves: No cranial nerve deficit.      Motor: No weakness.   Psychiatric:         Attention and Perception: Attention and perception normal.         Mood and Affect: Mood and affect normal.         Speech: Speech normal.         Behavior: Behavior normal. Behavior is cooperative.         Thought Content: Thought content normal.         Cognition and Memory: Memory normal.         Judgment: Judgment normal.     Fluids    Intake/Output Summary (Last 24 hours) at 4/8/2021 1935  Last data filed at 4/8/2021 0800  Gross per 24 hour   Intake 240 ml   Output no documentation   Net 240 ml     Laboratory    Imaging  DX-CHEST-LIMITED (1 VIEW)   Final Result      No evidence of acute cardiopulmonary process.      IR-US GUIDED PIV   Final Result    Ultrasound-guided PERIPHERAL IV INSERTION performed by    qualified nursing staff as above.      IR-MIDLINE CATHETER INSERTION WO GUIDANCE > AGE 3   Final Result                  Ultrasound-guided midline placement performed by qualified nursing staff    as above.          IR-US GUIDED PIV   Final Result    Ultrasound-guided PERIPHERAL IV INSERTION performed by    qualified nursing staff as above.      IR-MIDLINE CATHETER INSERTION WO GUIDANCE > AGE 3   Final Result                  Ultrasound-guided midline placement performed by qualified nursing staff    as above.          US-KOSTAS SINGLE LEVEL BILAT   Final Result      CT-HEAD W/O   Final Result      No acute intracranial  abnormality      DX-TIBIA AND FIBULA LEFT   Final Result      1.  Healed distal metaphyseal fractures of the left fibula and tibia with tibial IM layla in place.      2.  No acute fracture or dislocation.      3.  No radiopaque soft tissue foreign body.      DX-FEMUR-2+ LEFT   Final Result      1.  No radiographic evidence of acute traumatic injury left femur.      2.  Unchanged cortical thickening in the posterior aspect of the distal femoral diaphysis which may represent sequela of prior injury or infection.      3.  No soft tissue foreign body identified.      US-EXTREMITY VENOUS LOWER UNILAT LEFT   Final Result      DX-CHEST-PORTABLE (1 VIEW)   Final Result      No acute cardiopulmonary abnormality.         Assessment/Plan  * Wound of left leg- (present on admission)  Assessment & Plan  -Wound vac to LLE. Wound care following, appreciate assistance.   -Pain controlled, continue med regimen as currently ordered.   -Monitor for evidence of infection.     Psychiatric disorder- (present on admission)  Assessment & Plan  -Unspecified.  -Continue Risperdal and Depakote.  Stable on current treatment.  -Psychiatry has consulted and deemed him incapacitated to leave [place] or make medical decisions.  -SLP repeated cognitive evaluation in Jan 2021- continues to recommend 24/7 supervision.     Essential hypertension- (present on admission)  Assessment & Plan  -Well controlled on current regimen.   -Continue amlodipine.     Emphysema/COPD (HCC)- (present on admission)  Assessment & Plan  -Chronic and stable off medication.  -No acute exacerbation.     Protein malnutrition (HCC)- (present on admission)  Assessment & Plan  -Body mass index is 21.53 kg/m².-Slightly improving.   -Continue TID supplements.   -Encourage PO intake.    Flexion contracture of knee, 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)  Assessment & Plan  -Secondary to chronic wound in that area.  -Continue PT/OT.  -Does not seem to limit his mobility. Is frequently  ambulatory.  -Continue PRN flexeril . Robaxin added 3/14/2021.     VTE prophylaxis: Ambulatory

## 2021-04-09 NOTE — WOUND TEAM
[facility]  Inpatient Services  Wound and Skin Care Progress Note    Admission Date: 8/7/2020     Last order of IP CONSULT TO WOUND CARE was found on 9/1/2020 from Hospital Encounter on 8/7/2020     HPI, PMH, SH: Reviewed    Unit where seen by Wound Team: [id number]    WOUND CONSULT/FOLLOW UP RELATED TO:  Left leg scheduled negative pressure wound therapy (npwt) dressing change and 2 layer compression wrap change.      WOUND TYPE, LOCATION, CHARACTERISTICS (Pressure Injuries: location, stage, POA or date identified)        Negative Pressure Wound Therapy 11/20/20 Leg Lateral;Posterior;Medial Left (Active)   Vacuum Serial Number [id number]    NPWT Pump Mode / Pressure Setting Ulta;125 mmHg    Dressing Type Non-adherent;Medium;Black Foam (Regular)    Number of Foam Pieces Used 6    Canister Changed No         NEXT Dressing Change/Treatment Date 04/12/21                 Wound 11/19/20 Full Thickness Wound Leg Lateral;Posterior;Medial Left (Active)       04/09/21 1200   Site Assessment Red;Granulation tissue    Periwound Assessment Pink    Margins Attached edges    Closure Secondary intention    Drainage Amount Small    Drainage Description Sanguineous    Treatments Site care    Wound Cleansing Normal Saline Irrigation    Periwound Protectant Paste Ring    Dressing Cleansing/Solutions Not Applicable    Dressing Options Collagen Dressing;Steri-Strip;Wound Vac;Petroleum Gauze (clear)    Dressing Changed Changed    Dressing Status Intact    Dressing Change/Treatment Frequency Monday, Wednesday, Friday, and As Needed    NEXT Dressing Change/Treatment Date 04/12/21    NEXT Weekly Photo (Inpatient Only) 04/16/21    Non-staged Wound Description Full thickness    Wound Length (cm) SEE BELOW FOR FULL MEASUREMENTS    Tunneling (cm) 0 cm    Undermining (cm) 0 cm    Shape oval    Wound Odor Mild    Pulses Left;2+;DP;PT    Exposed Structures None                Lab Values:    Lab Results   Component Value Date/Time    WBC  5.9 03/18/2021 11:09 AM    RBC 4.19 (L) 03/18/2021 11:09 AM    HEMOGLOBIN 11.2 (L) 03/18/2021 11:09 AM    HEMATOCRIT 34.2 (L) 03/18/2021 11:09 AM    CREACTPROT 1.07 (H) 08/12/2020 01:55 PM    SEDRATEWES 85 (H) 08/07/2020 01:20 PM    HBA1C 5.7 (H) 11/09/2018 06:30 PM      Culture Results show:  Recent Results (from the past 720 hour(s))   CULTURE WOUND W/ GRAM STAIN    Collection Time: 09/01/20  2:00 PM    Specimen: Left Leg; Wound   Result Value Ref Range    Significant Indicator POS (POS)     Source WND     Site LEFT LEG     Culture Result - (A)     Gram Stain Result       Moderate Gram positive cocci.  Moderate Gram positive rods.      Culture Result (A)      Beta Hemolytic Streptococcus group A  Moderate growth      Culture Result (A)      Methicillin Resistant Staphylococcus aureus  Moderate growth      Culture Result (A)      Diphtheroids  Moderate growth  Mixed morphologies.     KOSTAS: 9/20/21   Left.    Doppler waveforms of the common femoral artery are of high amplitude and    triphasic.    Doppler waveforms at the ankle are brisk and triphasic.    Ankle-brachial index is normal.    Unable to obtained popliteal waveforms due to wound bandages.     Self Report / Pain Level: Pre-medicated     INTERVENTIONS BY WOUND TEAM: INTERVENTIONS BY WOUND TEAM:  Performed standard wound care which includes appropriate positioning, dressing removal and non-selective debridement. Pictures and measurements obtained weekly if/when required.    Cleansed: Wound cleanser and gauze used to clean wound beds and periwound  Debridement: NA  Periwound: Cleansed with cleanser and gauze. Prepped with no sting and paste rings. Drape bridge up along thigh to bridge trac pad up out of compressed area.    Primary - Biologic applied today by LPS usign sterile technique, Adaptic to wound beds, secured with steri strips   Lateral: One piece of half thickness cut to fit applied over wound. 2nd piece of half thickness circular black foam as  button for trac pad. All secured with drape.   Medial: 2 pieces of half thickness cut to fit over wound bed. Secured with drape. A second piece of half thickness circular black foam as button for trac pad. Trac pad applied. Suction initiated.     Secondary - Fenestrated mepilex around bilateral trac pads. Y-Connector used to connect trac pads to 1 vac. 2 layer compression wrap applied.    MEASUREMENTS:     Pre-procedure:   Medial 13.5 x 2.2 x 0  Lateral 15 x 3.5 x 0.1    Post-procedure:   Medial 13x 3.5 x 0  Lateral 14 x 4 x 0.1    Interdisciplinary consultation: Patient, Bedside RN, Aislinn MORENO    EVALUATION / RATIONALE FOR TREATMENT:  4/9: debridement and biologic placement performed by JUANITO DURBINN. Adaptic to remain in place during next change.    4/7: Biologic to be reapplied this week by JUANITO with possible debridement if deemed appropriate.  Wounds remain stable and appear to be to surface level. Y connect on tracpad still in use.     4/4/21: Received call pt had pulled trac pad off. Whole dressing changed. Y connected wounds with 2 separate trac pads to each wound to decrease risk of loss of NPWT. Bridged lateral trac pad up along thigh to keep out of compressed area to ease replacement if pt pulls off and making it more difficult for pt to dislodge.     4/2/2021 - Debridement performed by APRN as patient's wounds continue to fail to contract. Biologic in place to bilateral wound beds and will be applied by RAFITA Goldberg again next Friday 4/11/2021. During next vac change, adaptic to remain in place as it is protecting the biologic underneath.   3/30/21: Wound odor mild-moderate and wound bed bright red, now 100% granulation tissue.  Meredith-skin continues to be fragile/flakey; protected with hydrocolloid dressing to wound border.    3/26/21: Foul odor present at dressing removal, but significantly decreased after cleansing.  Continue current POC.  3/24/21 Wound bed red, with less drainage noted.  Strong, foul  odor still present.  APRN to see pt regarding wound bed. Continue with current plan of care.   3/22/21: Area unchanged from previous dressing change.  Wound malodorous.  Meredith-skin dry and flaky at superior wound border, hydocolloid thin dressing placed.   3/13/21 Wounds failing to contract.  Will trial intermittent wound vac setting to encourage greater stimulation of cellular activity in wound bed.    3/10/2021: debrided wound, odor present, continue NPWT  3/6/12 area unchanged, odor persists  3/1/21: Lidocaine unavailable during this dressing change. Small amount of debridement completed with curette. Continued with silver powder for its antimicrobial properties.   2/27/21 skin bridge enlarging  2/24/2021: Excisional debridement by LPS APRN performed for rolled edges that were starting to form along the posterior and medial left leg wound. Moderate bleeding managed by administration of lidocaine injection. CSWD performed for slough. Wound bed is beefy red following debridement. Resume agata and silver foam to manage bioburden. Continue with CSWD with each dressing change.   2/22/2021: Rolled edges starting to form along posterior thigh wound bed - may require excisional debridement by provider. Minimal changes to actual wound bed. Continue CSWD, agata, and silver foam to manage bioburden.   2/19/21: minimal to no changes from last assessment. Continue CSWD, agata, and silver foam for bioburden.       Goals: Steady decrease in wound area and depth weekly.    NURSING PLAN OF CARE ORDERS (X):    Dressing changes: See Dressing Care orders: X  Skin care: See Skin Care orders:   Rectal tube care: See Rectal Tube Care orders:   Other orders:      WOUND TEAM PLAN OF CARE:   Dressing changes by wound team:        Follow up 3 times weekly:                NPWT change 3 times weekly:  X,  NPWT changes 3x/ weekly with 2 layer compression wrap.  Follow up 1-2 times weekly:      Follow up Bi-Monthly:                   Follow  up as needed:       Other (explain):     Anticipated discharge plans:  LTACH:        SNF/Rehab:       Home Health Care:           Outpatient Wound Center:            Self Care:           Other- GH, unsure if they can manage wound vac. May need to DC with compression wrap only. TBD.

## 2021-04-09 NOTE — PROGRESS NOTES
"WOUND CARE PROVIDER PROGRESS NOTE        HPI: 65-year-old male homelessness, EtOH use, tobacco dependence, chronic left lower extremity wound admitted 8/7/2020 with left lower extremity wound infection.  Patient was recently hospitalized at [facility] in April 2020, evaluated by orthopedic surgery who recommended wound care.  Wound culture grew MSSA and strep.  Patient was recently seen at [facility] prior to this admission was given a prescription for antibiotics but did not fill this.  Patient reports that he has had this wound for 8 to 10 years.  He reports that he fell and went \"ass over tea kettle\".  He reports that he would clean the wound almost daily with soap and water at the homeless shelter.  Per chart review, patient reported he developed the ulcer in May 2018 when he fell while hiking.  Patient was seen at wound care clinic in August 2018.  Patient had a  assisting him while he was living at [facility].  Wound VAC /was ordered however  had concerns with patient's compliancy and/ access to medical care.  Skilled nursing facility was contacted to have patient be admitted, however he was not accepted due to Medicaid.    Not on any blood thinners.  Patient ambulatory in halls.  Allergies reviewed.  He denies any allergies.    Surgery date: 9/1/2020 by Belkis ELLER  Procedure: Excision debridement to left leg ulcer with injectable lidocaine and epinephrine     Surgery date: 11/19/2020 by Dr. Olvera  Procedure:1.  Irrigation and debridement of multiple compartments of the left leg with 2   separate 16 cm x 5 cm superficial ulcerations in posterior knee and calf.  2.  AmnioFix biologic sheet placement, left leg.  3.  Wound VAC placement, left leg, 16x5 + 16x5 cm.      Surgery date: 2/24/21 by Belkis ELLER  Procedure: Excision debridement to left leg ulcer with injectable lidocaine and epinephrine             Interval hx:   9/11/2020: pt calm cooperative. On " zyvox. Seen during VAC and two layer compression wrap change. Pt tolerating VAC and wraps. Wound decreased in size.   9/18/2020: Patient denies any fevers, chills, nausea, vomiting.  He is tolerating the veraflo wound VAC and 2 layer compression wrap.  Wound is decreasing in size.  Increased granular tissue noted, wound edges have improved however he does have rolled edges to popliteal fossa area.  Patient calm and cooperative, watching sports.  9/30/2020: Denies fevers, chills, nausea, vomiting.  Tolerating wound VAC and 2 layer compression wrap.  Refused nail care.  Wound culture positive for strep A and Proteus.  10/7/2020: completed 5 day course of zyvox. Denies fevers chills nausea vomiting.  Seen during veraflo VAC and 2 layer compression wrap change.    10/14/2020: Patient denies fevers, chills, nausea, vomiting.  Patient wound is malodorous complaining of increased pain to the wound.  Increased slough noted to wound bed despite veraflo wound VAC.  Reconsult ID.  10/16/2020: Patient seen during wound VAC change with Miranda wound care PT. patient denies fever, chills, nausea, vomiting.  Patient reports pain to wound and increase in pain with wound VAC change.  Patient has granular tissue throughout wound with mild amount of slough to posterior aspect of leg.  Malodorous with VAC removal.  Wound VAC nursing changed.  10/29/2020: Wound VAC was discontinued by wound team on 10/23/2020 due to slow progress and collagen was started.  Patient has completed 7-day antibiotic course of meropenem for multidrug resistant Proteus vulgaris.  Patient found to have lice and has been treated.  Nonfoul-smelling odor present with dressing removal.  11/5/2020: Seen with wound team during 2 layer compression dressing change and for excisional debridement.  No odor noted today.  Thick layer of biofilm noted.  Wound edges continue to improve but still remain to medial aspect of wound.  11/17/2020: Seen with wound team during  dressing and 2 layer compression wrap change.  Patient with severely thick scar tissue to wound edges.  Excisional debridement with injectable lidocaine and epinephrine performed today.  Patient denies any fevers, chills, nausea, vomiting.  11/23/2020: POD #4 SP left leg I&D with biologic and VAC placement.  VAC functioning.  Foul odor coming from wound.  11/30/2020: POD #11.  Patient tired of walking around with wound VAC machine.  But agreeable to continue with wound VAC.  Mild odor coming from wound with wound VAC removal.  12/7/2020: POD #18.  Seen during wound VAC change.  Sitter no longer at bedside.  12/14/2020 POD # 25.  Seen during wound VAC change with wound team.  Previous wound culture from last week showed organisms but they were not worked up.  New wound culture to be taken today.  Odor remains slightly diminished.  Drainage heavy, slightly decreased from last week.  Continue with nystatin powder and silver foam.  Patient tired today.  12/16/2020: Wound culture 12/14/2020 + for Proteus Mirabilis.  Discussed case with ID, previously following this admit.  Recommended Augmentin for 1 week.  12/17/2020: Patient not drinking boost.  Has stockpile in room.  Dietary consulted.  Started Augmentin yesterday. Contacted micro to review culture.  So far growing Proteus and diphtheroids.  Micro to assess for fungal component.  1/4/2021: Wounds have  into 2 wounds again and are decreasing in size.  No longer doing silver foam but rather Arglaes powder and nystatin powder.  Odor remains.  Completed antibiotics.  Guardianship hearing is 1/29/2021.  Had arginaid supplement 4 times per day for 2 weeks that started on 12/17/2020.  1/22/2021: Seen with wound team for VAC change.  Odor is decreased.  Skin bridges have formed and now patient has 3 smaller wounds.  However there is increased periwound breakdown.  Patient experiencing pain with VAC change to proximal thigh.  Topical lidocaine used.  Wound VAC held  for weekend due to periwound breakdown.  2/5/2021: Seen with wound team during VAC change.  Odor remains the same.  There is no longer skin bridge to the medial wound  it into 2.  Patient has drainage seeping out underneath the drape causing periwound irritation.  Excisional debridement was performed today.  Patient has guardian.  2/24/21: Seen with wound team during VAC change.  Odor remains.  Epibole to skin edges.  There is significant amount of slough and tenderness to lateral proximal thigh ulcer.  Excisional debridement performed today with injectable lidocaine and epinephrine.  Consent obtained from guardian.  3/3/21: POD # 7 s/p bedside I & D. Restarted L arginine supplements. Has not drank supplement yet today.   3/10/2021: POD #14.  Drinking L-arginine supplements.  Showered before wound VAC change.  Patient trimmed his toenails and fingernails last week.  3/24/2021: Has completed L-arginine supplements.  Wound remains unchanged.  Consider plastic surgery consult for skin graft.  4/2/2021: Patient in a pleasant mood.  Less odor to wound.  Wounds to leg have only slightly decreased in size.  Excisional debridement and application of amniofix followed by VAC applied today.  4/9/2021: Patient seen with Pamela Wound Care Rn, Margarita and José Miguel scanlon. Wounds decreasing in size and with pink granular tissue. Wounds debrided and amniofix membrane applied to wounds prior to wound vac reapplication.        Results:  Wound culture left leg 12/14/2020: No fungal growth, Proteus mirabilis  Wound culture left leg 12/7/2020:Light growth mixed organisms.   Covid screen not detected 11/17/2020  CBC 11/22/2020: WBC 6.5, hemoglobin and hematocrit 9.9/30.5.  Platelet count 403.  CBC with differential 10/14/2020: WBC 6, H&H 11/33.9  Wound culture: 9/28/2020: Positive for beta-hemolytic strep group A, Proteus.    Wound cx: 9/1/2020 mrsa, diphtheroids, beta hemolytic strep group A    8/7/2020: X-ray tib-fib  left:  1.  Healed distal metaphyseal fractures of the left fibula and tibia with tibial IM layla in place.     2.  No acute fracture or dislocation.     3.  No radiopaque soft tissue foreign body.      Arterial studies:   9/2/2020  Right.    Doppler waveforms of the common femoral artery are of high amplitude and    triphasic.    Doppler waveforms at the ankle are brisk and triphasic.    Ankle-brachial index is normal.       Left.    Doppler waveforms of the common femoral artery are of high amplitude and    triphasic.    Doppler waveforms at the ankle are brisk and triphasic.    Ankle-brachial index is normal.    Unable to obtained popliteal waveforms due to wound bandages.        Exam:    Palpable PT pulse to left foot +1 foot   +2 DP left foot  Difficulty palpating popliteal due to scar tissue      Left lower extremity:    Posterior ulcer:  Full thickness ulceration.   Previous biologic well incorporated and wound with pink granular tissue  Wound edges flat and attached.   No periwound erythema, edema.   No drainage or odor.   Tenderness to proximal aspect of wound.       Medial ulcer:  Full thickness ulceration.   Previous biologic well incorporated and wound bed with pink granular tissue.   Wound edges attached.   No edema, erythema, odor, or drainage.   Mild tenderness to proximal aspect of wound.         PROCEDURE: 2% viscous topical lidocaine applied to both leg wounds, dwelled for approximately 5 minutes.  -Curette used to debride wound beds.  Excisional debridement was performed to remove devitalized tissue until healthy, bleeding tissue was visualized.   Entire surface of wound, 101.5 cm2 debrided.  Tissue debrided into the subcutaneous layer.     -Bleeding controlled with manual pressure.    -Under sterile conditions, three 2 x 12 cm total amniofix were cut and applied to the medial and posterior wound beds.  Two 2 x 12 cm amnio fix products were cut and applied to the lateral wound bed.  Amniofix was  moistened with NS followed by Adaptic and Steri-Strips to secure the product for both wounds.  No wastage of product.  -Wound care completed by wound RN, refer to flowsheet  -Patient tolerated the procedure well, without c/o pain or discomfort.       Post debridement measurements:  LLE posterior wound   Measurements 13cm x 3.5cm x UTO       LLE medial wound  Measurements 14cm x 4cm x 0.1cm     Amniofix lo2021: Amniofix  2 x 12 cm, expires 2023       Amniofix  2 x 12 cm, expires 2024       Amniofix  2 x 12 cm, expires 2023:        Amniofix  2 x 12 cm, expires 10/1/2023, serial # [id number]       Amniofix  2 x 12 cm, expires 2026, serial # [id number]       Amniofix  2 x 12 cm, expires 2026, serial # [id number]       Predebridement photos:  LLE Lateral wound pre-debridement      LLE  medial wound pre-debridement        Post debridement photos:  LLE posterior wound post debridement  Measurements 13cm x 3.5cm x UTO         LLE medial wound post debridement  Measurements 14cm x 4cm x 0.1cm           ASSESSMENT/PLAN:  65-year-old male with homelessness, EtOH use, tobacco use, and chronic left leg ulcer.  SP left leg I&D with amnio fix and wound VAC placement by Dr. Olvera on 2020    -Wound remains as 2 wounds.  Skin bridge to medial wound is no longer present   Wound size slightly decreased but remains essentially unchanged.   -L arginine supplements restarted 3/2/21.  Patient has completed 2-week course of supplements.  -periwound irritation resolved with hydrocolloid.  -Odor remains, heavy drainage slightly decreased  -biofilm debrided each visit  -Edema controlled with 2 layer compression wrap.  -Patient has completed 1 week of Augmentin and L-arginine supplement for 2 weeks in 2020  -Patient to shower before each wound VAC change  -Amnio fix followed by wound VAC applied to both wounds.      PLAN  -Continue wound VAC.  Change 3 times  per week.   -APRN will return weekly for assessment and debridement  -Wound care: Hydrocolloid to periwound, Adaptic over amnio fix, Steri-Strips, black foam, drape to wound at 150 mmHg continuous  -Continue 2 layer compression wrap extending to thigh  -shower prior to VAC changes, ok to remove 2 layer compression wrap to shower    Patient to continue to be seen by wound care team while admitted   wound care nurse practitioner to follow weekly      Discharge plan:  Working on group home placement.  Patient has guardian.      D/W: Patient, Bedside RN, Pamela Wound Care RN, Precious scanlon

## 2021-04-10 PROCEDURE — A9270 NON-COVERED ITEM OR SERVICE: HCPCS | Performed by: NURSE PRACTITIONER

## 2021-04-10 PROCEDURE — 700102 HCHG RX REV CODE 250 W/ 637 OVERRIDE(OP): Performed by: NURSE PRACTITIONER

## 2021-04-10 PROCEDURE — 770001 HCHG ROOM/CARE - MED/SURG/GYN PRIV*

## 2021-04-10 PROCEDURE — 700101 HCHG RX REV CODE 250: Performed by: NURSE PRACTITIONER

## 2021-04-10 RX ADMIN — DIVALPROEX SODIUM 125 MG: 125 CAPSULE ORAL at 20:23

## 2021-04-10 RX ADMIN — METHOCARBAMOL TABLETS 500 MG: 500 TABLET, COATED ORAL at 17:50

## 2021-04-10 RX ADMIN — METHOCARBAMOL TABLETS 500 MG: 500 TABLET, COATED ORAL at 11:31

## 2021-04-10 RX ADMIN — DIVALPROEX SODIUM 125 MG: 125 CAPSULE ORAL at 04:10

## 2021-04-10 RX ADMIN — MORPHINE SULFATE 15 MG: 15 TABLET, FILM COATED, EXTENDED RELEASE ORAL at 17:50

## 2021-04-10 RX ADMIN — DOCUSATE SODIUM 50 MG AND SENNOSIDES 8.6 MG 2 TABLET: 8.6; 5 TABLET, FILM COATED ORAL at 04:10

## 2021-04-10 RX ADMIN — GABAPENTIN 300 MG: 300 CAPSULE ORAL at 04:10

## 2021-04-10 RX ADMIN — RISPERIDONE 0.5 MG: 0.5 TABLET ORAL at 04:10

## 2021-04-10 RX ADMIN — METHOCARBAMOL TABLETS 500 MG: 500 TABLET, COATED ORAL at 04:10

## 2021-04-10 RX ADMIN — RISPERIDONE 1 MG: 1 TABLET ORAL at 17:50

## 2021-04-10 RX ADMIN — OXYCODONE 5 MG: 5 TABLET ORAL at 10:38

## 2021-04-10 RX ADMIN — LIDOCAINE 1 PATCH: 50 PATCH CUTANEOUS at 11:31

## 2021-04-10 RX ADMIN — GABAPENTIN 300 MG: 300 CAPSULE ORAL at 17:50

## 2021-04-10 RX ADMIN — AMLODIPINE BESYLATE 5 MG: 5 TABLET ORAL at 04:10

## 2021-04-10 RX ADMIN — TRAZODONE HYDROCHLORIDE 50 MG: 100 TABLET ORAL at 20:22

## 2021-04-10 RX ADMIN — DIVALPROEX SODIUM 125 MG: 125 CAPSULE ORAL at 14:24

## 2021-04-10 RX ADMIN — METHOCARBAMOL TABLETS 500 MG: 500 TABLET, COATED ORAL at 20:22

## 2021-04-10 RX ADMIN — GABAPENTIN 300 MG: 300 CAPSULE ORAL at 11:32

## 2021-04-10 RX ADMIN — MORPHINE SULFATE 15 MG: 15 TABLET, FILM COATED, EXTENDED RELEASE ORAL at 04:10

## 2021-04-10 RX ADMIN — DOCUSATE SODIUM 50 MG AND SENNOSIDES 8.6 MG 2 TABLET: 8.6; 5 TABLET, FILM COATED ORAL at 17:50

## 2021-04-10 ASSESSMENT — COGNITIVE AND FUNCTIONAL STATUS - GENERAL
MOBILITY SCORE: 24
DAILY ACTIVITIY SCORE: 24
SUGGESTED CMS G CODE MODIFIER DAILY ACTIVITY: CH
SUGGESTED CMS G CODE MODIFIER MOBILITY: CH

## 2021-04-10 NOTE — CARE PLAN
Problem: Safety  Goal: Will remain free from falls  Description: Pt mobilizes frequently independently.   Outcome: PROGRESSING AS EXPECTED  Note: Free of falls.  Fall precautions in place.       Problem: Infection  Goal: Will remain free from infection  Outcome: PROGRESSING AS EXPECTED  Note: Wound vac changed today by wound RN.  Dressing is clean, dry, and intact.       Problem: Pain Management  Goal: Pain level will decrease to patient's comfort goal  Outcome: PROGRESSING AS EXPECTED  Note: Pain controlled with pain med regimen.  Heat packs provided for patient's low back pain.

## 2021-04-10 NOTE — CARE PLAN
Problem: Safety  Goal: Will remain free from injury  Outcome: PROGRESSING AS EXPECTED: safety precautions in place, pt non-compliant.  Goal: Will remain free from falls  Description: Pt mobilizes frequently independently.   Outcome: PROGRESSING AS EXPECTED: no falls today, bed alarm on.     Problem: Infection  Goal: Will remain free from infection  Outcome: PROGRESSING AS EXPECTED: no new s/sx infection.     Problem: Venous Thromboembolism (VTW)/Deep Vein Thrombosis (DVT) Prevention:  Goal: Patient will participate in Venous Thrombosis (VTE)/Deep Vein Thrombosis (DVT)Prevention Measures  Outcome: PROGRESSING SLOWER THAN EXPECTED: pt refused SCD right leg.     Problem: Bowel/Gastric:  Goal: Normal bowel function is maintained or improved  Outcome: PROGRESSING AS EXPECTED: WNL  Goal: Will not experience complications related to bowel motility  Outcome: PROGRESSING AS EXPECTED: WNL     Problem: Knowledge Deficit  Goal: Knowledge of disease process/condition, treatment plan, diagnostic tests, and medications will improve  Outcome: PROGRESSING AS EXPECTED: confused, education provided.  Goal: Knowledge of the prescribed therapeutic regimen will improve  Outcome: PROGRESSING AS EXPECTED: confused, education provided.     Problem: Discharge Barriers/Planning  Goal: Patient's continuum of care needs will be met  Outcome: PROGRESSING AS EXPECTED: not medically cleared at this time.     Problem: Fluid Volume:  Goal: Will maintain balanced intake and output  Outcome: PROGRESSING AS EXPECTED: WNL. Monitoring.     Problem: Pain Management  Goal: Pain level will decrease to patient's comfort goal  Outcome: PROGRESSING AS EXPECTED: See pain assessments, MAR.     Problem: Psychosocial Needs:  Goal: Level of anxiety will decrease  Outcome: PROGRESSING AS EXPECTED: No anxiety noted/reported.     Problem: Mobility  Goal: Risk for activity intolerance will decrease  Outcome: PROGRESSING AS EXPECTED: no activity intolerance noted.

## 2021-04-10 NOTE — PROGRESS NOTES
2 RN skin check complete.   Devices in place: LLE wound vac  Skin assessed under devices: no, dressing done today with wound care in place.  Confirmed pressure ulcers found: NA.  New potential pressure ulcers noted: NA Wound consult placed: Yes, active  The following interventions in place: Pillows, encouraged frequent repositioning, wound vac on per order

## 2021-04-11 ENCOUNTER — APPOINTMENT (OUTPATIENT)
Dept: RADIOLOGY | Facility: MEDICAL CENTER | Age: 66
DRG: 853 | End: 2021-04-11
Attending: NURSE PRACTITIONER
Payer: MEDICARE

## 2021-04-11 LAB
ALBUMIN SERPL BCP-MCNC: 3.8 G/DL (ref 3.2–4.9)
ALBUMIN/GLOB SERPL: 1 G/DL
ALP SERPL-CCNC: 113 U/L (ref 30–99)
ALT SERPL-CCNC: 24 U/L (ref 2–50)
ANION GAP SERPL CALC-SCNC: 10 MMOL/L (ref 7–16)
AST SERPL-CCNC: 21 U/L (ref 12–45)
BASOPHILS # BLD AUTO: 1.2 % (ref 0–1.8)
BASOPHILS # BLD: 0.07 K/UL (ref 0–0.12)
BILIRUB SERPL-MCNC: <0.2 MG/DL (ref 0.1–1.5)
BUN SERPL-MCNC: 16 MG/DL (ref 8–22)
CALCIUM SERPL-MCNC: 8.9 MG/DL (ref 8.5–10.5)
CHLORIDE SERPL-SCNC: 97 MMOL/L (ref 96–112)
CO2 SERPL-SCNC: 23 MMOL/L (ref 20–33)
CREAT SERPL-MCNC: 0.87 MG/DL (ref 0.5–1.4)
EOSINOPHIL # BLD AUTO: 0.2 K/UL (ref 0–0.51)
EOSINOPHIL NFR BLD: 3.4 % (ref 0–6.9)
ERYTHROCYTE [DISTWIDTH] IN BLOOD BY AUTOMATED COUNT: 48.4 FL (ref 35.9–50)
GLOBULIN SER CALC-MCNC: 3.7 G/DL (ref 1.9–3.5)
GLUCOSE SERPL-MCNC: 99 MG/DL (ref 65–99)
HCT VFR BLD AUTO: 38 % (ref 42–52)
HGB BLD-MCNC: 12.2 G/DL (ref 14–18)
IMM GRANULOCYTES # BLD AUTO: 0.02 K/UL (ref 0–0.11)
IMM GRANULOCYTES NFR BLD AUTO: 0.3 % (ref 0–0.9)
LACTATE BLD-SCNC: 1.1 MMOL/L (ref 0.5–2)
LACTATE BLD-SCNC: 1.4 MMOL/L (ref 0.5–2)
LACTATE BLD-SCNC: 1.4 MMOL/L (ref 0.5–2)
LACTATE BLD-SCNC: 1.7 MMOL/L (ref 0.5–2)
LYMPHOCYTES # BLD AUTO: 2.27 K/UL (ref 1–4.8)
LYMPHOCYTES NFR BLD: 38.7 % (ref 22–41)
MAGNESIUM SERPL-MCNC: 1.7 MG/DL (ref 1.5–2.5)
MCH RBC QN AUTO: 27 PG (ref 27–33)
MCHC RBC AUTO-ENTMCNC: 32.1 G/DL (ref 33.7–35.3)
MCV RBC AUTO: 84.1 FL (ref 81.4–97.8)
MONOCYTES # BLD AUTO: 0.61 K/UL (ref 0–0.85)
MONOCYTES NFR BLD AUTO: 10.4 % (ref 0–13.4)
NEUTROPHILS # BLD AUTO: 2.69 K/UL (ref 1.82–7.42)
NEUTROPHILS NFR BLD: 46 % (ref 44–72)
NRBC # BLD AUTO: 0 K/UL
NRBC BLD-RTO: 0 /100 WBC
OSMOLALITY UR: 202 MOSM/KG H2O (ref 300–900)
PLATELET # BLD AUTO: 425 K/UL (ref 164–446)
PMV BLD AUTO: 8.8 FL (ref 9–12.9)
POTASSIUM SERPL-SCNC: 3.8 MMOL/L (ref 3.6–5.5)
PROCALCITONIN SERPL-MCNC: <0.05 NG/ML
PROT SERPL-MCNC: 7.5 G/DL (ref 6–8.2)
RBC # BLD AUTO: 4.52 M/UL (ref 4.7–6.1)
SODIUM SERPL-SCNC: 130 MMOL/L (ref 135–145)
SODIUM UR-SCNC: 28 MMOL/L
VALPROATE SERPL-MCNC: 30.8 UG/ML (ref 50–100)
WBC # BLD AUTO: 5.9 K/UL (ref 4.8–10.8)

## 2021-04-11 PROCEDURE — A9270 NON-COVERED ITEM OR SERVICE: HCPCS | Performed by: NURSE PRACTITIONER

## 2021-04-11 PROCEDURE — 700102 HCHG RX REV CODE 250 W/ 637 OVERRIDE(OP): Performed by: NURSE PRACTITIONER

## 2021-04-11 PROCEDURE — 70450 CT HEAD/BRAIN W/O DYE: CPT | Mod: MG

## 2021-04-11 PROCEDURE — 83735 ASSAY OF MAGNESIUM: CPT

## 2021-04-11 PROCEDURE — 700111 HCHG RX REV CODE 636 W/ 250 OVERRIDE (IP): Performed by: NURSE PRACTITIONER

## 2021-04-11 PROCEDURE — 80164 ASSAY DIPROPYLACETIC ACD TOT: CPT

## 2021-04-11 PROCEDURE — 70250 X-RAY EXAM OF SKULL: CPT

## 2021-04-11 PROCEDURE — 83935 ASSAY OF URINE OSMOLALITY: CPT

## 2021-04-11 PROCEDURE — 85025 COMPLETE CBC W/AUTO DIFF WBC: CPT

## 2021-04-11 PROCEDURE — 74018 RADEX ABDOMEN 1 VIEW: CPT

## 2021-04-11 PROCEDURE — 71045 X-RAY EXAM CHEST 1 VIEW: CPT

## 2021-04-11 PROCEDURE — 99232 SBSQ HOSP IP/OBS MODERATE 35: CPT | Performed by: NURSE PRACTITIONER

## 2021-04-11 PROCEDURE — 36415 COLL VENOUS BLD VENIPUNCTURE: CPT

## 2021-04-11 PROCEDURE — 770001 HCHG ROOM/CARE - MED/SURG/GYN PRIV*

## 2021-04-11 PROCEDURE — 700101 HCHG RX REV CODE 250: Performed by: NURSE PRACTITIONER

## 2021-04-11 PROCEDURE — 84300 ASSAY OF URINE SODIUM: CPT

## 2021-04-11 PROCEDURE — 84145 PROCALCITONIN (PCT): CPT

## 2021-04-11 PROCEDURE — 83605 ASSAY OF LACTIC ACID: CPT

## 2021-04-11 PROCEDURE — 70551 MRI BRAIN STEM W/O DYE: CPT | Mod: MG

## 2021-04-11 PROCEDURE — 80053 COMPREHEN METABOLIC PANEL: CPT

## 2021-04-11 RX ORDER — SODIUM CHLORIDE 9 MG/ML
1000 INJECTION, SOLUTION INTRAVENOUS CONTINUOUS
Status: DISCONTINUED | OUTPATIENT
Start: 2021-04-11 | End: 2021-04-12

## 2021-04-11 RX ORDER — HALOPERIDOL 5 MG/ML
5 INJECTION INTRAMUSCULAR ONCE
Status: COMPLETED | OUTPATIENT
Start: 2021-04-11 | End: 2021-04-11

## 2021-04-11 RX ORDER — LORAZEPAM 1 MG/1
1 TABLET ORAL ONCE
Status: COMPLETED | OUTPATIENT
Start: 2021-04-11 | End: 2021-04-11

## 2021-04-11 RX ADMIN — AMLODIPINE BESYLATE 5 MG: 5 TABLET ORAL at 04:34

## 2021-04-11 RX ADMIN — LIDOCAINE 1 PATCH: 50 PATCH CUTANEOUS at 12:13

## 2021-04-11 RX ADMIN — GABAPENTIN 300 MG: 300 CAPSULE ORAL at 04:34

## 2021-04-11 RX ADMIN — DIVALPROEX SODIUM 125 MG: 125 CAPSULE ORAL at 04:34

## 2021-04-11 RX ADMIN — TRAZODONE HYDROCHLORIDE 50 MG: 100 TABLET ORAL at 21:22

## 2021-04-11 RX ADMIN — HALOPERIDOL LACTATE 5 MG: 5 INJECTION, SOLUTION INTRAMUSCULAR at 07:36

## 2021-04-11 RX ADMIN — MORPHINE SULFATE 15 MG: 15 TABLET, FILM COATED, EXTENDED RELEASE ORAL at 18:00

## 2021-04-11 RX ADMIN — METHOCARBAMOL TABLETS 500 MG: 500 TABLET, COATED ORAL at 18:00

## 2021-04-11 RX ADMIN — DIVALPROEX SODIUM 125 MG: 125 CAPSULE ORAL at 14:07

## 2021-04-11 RX ADMIN — MORPHINE SULFATE 15 MG: 15 TABLET, FILM COATED, EXTENDED RELEASE ORAL at 04:34

## 2021-04-11 RX ADMIN — GABAPENTIN 300 MG: 300 CAPSULE ORAL at 12:13

## 2021-04-11 RX ADMIN — OXYCODONE 5 MG: 5 TABLET ORAL at 02:38

## 2021-04-11 RX ADMIN — RISPERIDONE 1 MG: 1 TABLET ORAL at 18:00

## 2021-04-11 RX ADMIN — LORAZEPAM 1 MG: 1 TABLET ORAL at 18:00

## 2021-04-11 RX ADMIN — RISPERIDONE 0.5 MG: 0.5 TABLET ORAL at 04:34

## 2021-04-11 RX ADMIN — DIVALPROEX SODIUM 125 MG: 125 CAPSULE ORAL at 21:22

## 2021-04-11 RX ADMIN — DOCUSATE SODIUM 50 MG AND SENNOSIDES 8.6 MG 2 TABLET: 8.6; 5 TABLET, FILM COATED ORAL at 04:35

## 2021-04-11 RX ADMIN — GABAPENTIN 300 MG: 300 CAPSULE ORAL at 18:00

## 2021-04-11 RX ADMIN — METHOCARBAMOL TABLETS 500 MG: 500 TABLET, COATED ORAL at 21:22

## 2021-04-11 RX ADMIN — METHOCARBAMOL TABLETS 500 MG: 500 TABLET, COATED ORAL at 04:34

## 2021-04-11 RX ADMIN — HYDROXYZINE HYDROCHLORIDE 25 MG: 50 TABLET, FILM COATED ORAL at 02:38

## 2021-04-11 RX ADMIN — METHOCARBAMOL TABLETS 500 MG: 500 TABLET, COATED ORAL at 12:13

## 2021-04-11 ASSESSMENT — ENCOUNTER SYMPTOMS
INSOMNIA: 1
ABDOMINAL PAIN: 0
DIZZINESS: 0
VOMITING: 0
NERVOUS/ANXIOUS: 1
DIARRHEA: 0
COUGH: 0
MEMORY LOSS: 1
SPEECH CHANGE: 0
HEADACHES: 0
PALPITATIONS: 0
SENSORY CHANGE: 0
FEVER: 0
CONSTIPATION: 0
WEAKNESS: 0
FOCAL WEAKNESS: 0
NAUSEA: 0
SHORTNESS OF BREATH: 0
DEPRESSION: 0

## 2021-04-11 ASSESSMENT — PAIN DESCRIPTION - PAIN TYPE
TYPE: ACUTE PAIN;CHRONIC PAIN
TYPE: ACUTE PAIN

## 2021-04-11 NOTE — PROGRESS NOTES
Hospital Medicine Twice Weekly Progress Note    Date of Service  4/11/2021    Chief Complaint  LLE wound infection    Hospital Course  Mr. Varela is a 65-year-old male with a PMHx of alcohol dependence, tobacco dependence, psychiatric disorder, and HTN who presented to the emergency department on 8/7/2020 with a left lower extremity wound infection. He was hospitalized at our facility last April and was evaluated by orthopedic surgery who recommended ongoing wound care. Wound cultures at that time grew MSSA and Streptococcus. He had been seen at [facility] earlier that week and prescribed antibiotics, but never filled the prescription.  An ultrasound of that extremity was done and was negative for DVT.  He was treated for sepsis and completed an antibiotic course on 9/16/2020. He was also found to have head lice and underwent treatment for that.  A repeat wound culture was done on 9/28/2020 and grew Strep A and Proteus. Infectious disease was consulted and recommended a 5-day course of IV zosyn which was completed on 10/5/2020. On 10/12/2020 the wound care team noticed increased inflammation to the proximal part of the wound bed and switched from a vera flow wound VAC to a regular wound VAC.  ID was again consulted and on 10/14/2020 recommended a 7-day course of antibiotics with meropenem which was completed on 10/22/2020. Additionally, he was noted to have intermittent agitation so was started on risperdal, depakote, and gabapentin per psychiatry recommendations.  On 11/20/2020 he underwent left lower extremity debridement with wound VAC placement. Since then he has remained stable but has been deemed incapacitated to make medical decisions so guardianship was pursued and granted on 1/29/21. Placement will likely be to a group home with home health services.     Interval Problem Update  -Agitated, angry, acutely confused, doesn't remember who I am despite multiple pleasant encounters and discussions about  [employer]. Labs obtained, only significant for Na 130. Stat CT head also negative. Per nursing, didn't sleep well last night.   -Obtain MRI.   -Obtain urine Na & Os.     Consultants/Specialty  Orthopedic surgery  Infectious disease  Psychiatry    Code Status  Full Code    Disposition  Guardianship established. Possible group home with home health vs SNF.     Review of Systems  Review of Systems   Constitutional: Negative for fever and malaise/fatigue.   Respiratory: Negative for cough and shortness of breath.    Cardiovascular: Positive for leg swelling (LLE). Negative for chest pain and palpitations.   Gastrointestinal: Negative for abdominal pain, constipation (LBM this a.m. ), diarrhea, nausea and vomiting.   Genitourinary: Negative for dysuria, frequency and urgency.   Musculoskeletal:        Denies pain   Neurological: Negative for dizziness, sensory change, speech change, focal weakness, weakness and headaches.   Psychiatric/Behavioral: Positive for memory loss. Negative for depression. The patient is nervous/anxious and has insomnia.    All other systems reviewed and are negative.     Physical Exam  Temp:  [35.9 °C (96.6 °F)-36.6 °C (97.9 °F)] 35.9 °C (96.6 °F)  Pulse:  [66-76] 76  Resp:  [17-20] 20  BP: (112-138)/(70-79) 112/70  SpO2:  [94 %-99 %] 95 %    Physical Exam  Vitals and nursing note reviewed.   Constitutional:       General: He is awake.      Appearance: He is well-developed. He is ill-appearing (chronically ill appearing).   HENT:      Head: Normocephalic and atraumatic.      Mouth/Throat:      Lips: Pink.      Mouth: Mucous membranes are moist.   Eyes:      Conjunctiva/sclera: Conjunctivae normal.      Pupils: Pupils are equal, round, and reactive to light.      Comments: PERRL   Cardiovascular:      Rate and Rhythm: Normal rate and regular rhythm.      Pulses: Normal pulses.      Heart sounds: Normal heart sounds.   Pulmonary:      Effort: Pulmonary effort is normal.      Breath sounds:  Normal breath sounds.   Abdominal:      General: Bowel sounds are normal. There is no distension or abdominal bruit.      Palpations: Abdomen is soft.      Tenderness: There is no abdominal tenderness.   Musculoskeletal:      Cervical back: Normal range of motion and neck supple.      Right lower leg: No edema.      Left lower leg: No edema.      Comments: L knee contracture   Skin:     General: Skin is warm and dry.          Neurological:      General: No focal deficit present.      Mental Status: He is alert. He is disoriented and confused.      Cranial Nerves: No cranial nerve deficit or dysarthria.      Motor: No weakness.      Gait: Gait normal.   Psychiatric:         Attention and Perception: Attention and perception normal.         Mood and Affect: Mood is anxious. Affect is angry and inappropriate.         Speech: Speech normal.         Behavior: Behavior is uncooperative, agitated and aggressive.         Thought Content: Thought content is delusional.         Cognition and Memory: Cognition is impaired. Memory is impaired. He exhibits impaired recent memory and impaired remote memory.         Judgment: Judgment is impulsive and inappropriate.     Fluids    Intake/Output Summary (Last 24 hours) at 4/11/2021 1242  Last data filed at 4/11/2021 0900  Gross per 24 hour   Intake 340 ml   Output 0 ml   Net 340 ml     Laboratory    Imaging  CT-HEAD W/O   Final Result      No acute intracranial hemorrhage is identified.      Mild white matter hypodensity is present.  This is a nonspecific finding which usually is found to represent chronic microvascular disease in patient's of this demographic.  Demyelination, age indeterminant ischemia and gliosis are also common    possibilities.      Mild parenchymal atrophy      DX-CHEST-LIMITED (1 VIEW)   Final Result      No evidence of acute cardiopulmonary process.      IR-US GUIDED PIV   Final Result    Ultrasound-guided PERIPHERAL IV INSERTION performed by    qualified  nursing staff as above.      IR-MIDLINE CATHETER INSERTION WO GUIDANCE > AGE 3   Final Result                  Ultrasound-guided midline placement performed by qualified nursing staff    as above.          IR-US GUIDED PIV   Final Result    Ultrasound-guided PERIPHERAL IV INSERTION performed by    qualified nursing staff as above.      IR-MIDLINE CATHETER INSERTION WO GUIDANCE > AGE 3   Final Result                  Ultrasound-guided midline placement performed by qualified nursing staff    as above.          US-KOSTAS SINGLE LEVEL BILAT   Final Result      CT-HEAD W/O   Final Result      No acute intracranial abnormality      DX-TIBIA AND FIBULA LEFT   Final Result      1.  Healed distal metaphyseal fractures of the left fibula and tibia with tibial IM layla in place.      2.  No acute fracture or dislocation.      3.  No radiopaque soft tissue foreign body.      DX-FEMUR-2+ LEFT   Final Result      1.  No radiographic evidence of acute traumatic injury left femur.      2.  Unchanged cortical thickening in the posterior aspect of the distal femoral diaphysis which may represent sequela of prior injury or infection.      3.  No soft tissue foreign body identified.      US-EXTREMITY VENOUS LOWER UNILAT LEFT   Final Result      DX-CHEST-PORTABLE (1 VIEW)   Final Result      No acute cardiopulmonary abnormality.      MR-BRAIN-W/O    (Results Pending)      Assessment/Plan  * Wound of left leg- (present on admission)  Assessment & Plan  -Wound vac to LLE. Wound care following, appreciate assistance.   -Pain controlled, continue med regimen as currently ordered.   -Monitor for evidence of infection.     Encephalopathy, unspecified- (present on admission)  Assessment & Plan  -Acutely encephalopathic this morning.  Very confused, angry, agitated.  Does not remember who I am despite multiple pleasant encounters and discussions about dying .    -Lab work obtained, significant only for a sodium of 130.  Obtaining urinalysis and  urine sodium.  Clinical picture does not indicate infection.  -Stat CT head obtained and was negative.  -Obtain MRI.  -Depakote level low.  -No recent changes to medications.    Psychiatric disorder- (present on admission)  Assessment & Plan  -Unspecified.  -Continue Risperdal and Depakote.   -Psychiatry was consulted and deemed him incapacitated to leave AMA or make medical decisions.  -SLP repeated cognitive evaluation in Jan 2021- continues to recommend 24/7 supervision.     Essential hypertension- (present on admission)  Assessment & Plan  -Well controlled on current regimen.   -Continue amlodipine.     Emphysema/COPD (HCC)- (present on admission)  Assessment & Plan  -Chronic and stable off medication.  -No acute exacerbation.     Protein malnutrition (HCC)- (present on admission)  Assessment & Plan  -Body mass index is 21.53 kg/m².-Slightly improving.   -Continue TID supplements.   -Encourage PO intake.    Flexion contracture of knee, 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)  Assessment & Plan  -Secondary to chronic wound in that area.  -Continue PT/OT.  -Does not seem to limit his mobility. Is frequently ambulatory.  -Continue PRN flexeril . Robaxin added 3/14/2021.     VTE prophylaxis: Ambulatory

## 2021-04-11 NOTE — PROGRESS NOTES
"Pt with bed alarm going off multiple times within the last hour. Pt harder to reorient. Pt hitting self in head. Pt states \"I'm frustrated that every time I go to get up this alarm goes off\" Pt was educated on the need for the bed alarm. Pt educated on the use of the call light and encouraged to use the call light when needing to get out of bed so that he does not trip over his wound vac cannister and tubing. CNA present during the time of education.   "

## 2021-04-11 NOTE — CARE PLAN
Problem: Knowledge Deficit  Goal: Knowledge of disease process/condition, treatment plan, diagnostic tests, and medications will improve  Outcome: PROGRESSING AS EXPECTED: Pt informed, does not want to talk about plan of care. Pt has a court appointed guardian.   Goal: Knowledge of the prescribed therapeutic regimen will improve  Outcome: PROGRESSING AS EXPECTED: Pt informed, does not want to discuss further.     Problem: Discharge Barriers/Planning  Goal: Patient's continuum of care needs will be met  Outcome: PROGRESSING AS EXPECTED: waiting on placement.     Problem: Fluid Volume:  Goal: Will maintain balanced intake and output  Outcome: PROGRESSING AS EXPECTED: WNL     Problem: Pain Management  Goal: Pain level will decrease to patient's comfort goal  Outcome: PROGRESSING AS EXPECTED: in progress

## 2021-04-11 NOTE — CARE PLAN
Pt a/o x1-2 overnight. Becoming more confused as night progressed. PRNs given. Pt harder to reorient and hitting himself in the head when frustrated with RNs and bed alarm going off. Pt educated on wound vac, bed alarm, and call light, as well as high risk for falls.   Problem: Safety  Goal: Will remain free from injury  Outcome: PROGRESSING AS EXPECTED  Goal: Will remain free from falls  Description: Pt mobilizes frequently independently.   Outcome: PROGRESSING AS EXPECTED     Problem: Infection  Goal: Will remain free from infection  Outcome: PROGRESSING AS EXPECTED     Problem: Pain Management  Goal: Pain level will decrease to patient's comfort goal  Outcome: PROGRESSING AS EXPECTED     Problem: Mobility  Goal: Risk for activity intolerance will decrease  Outcome: PROGRESSING AS EXPECTED

## 2021-04-12 ENCOUNTER — APPOINTMENT (OUTPATIENT)
Dept: RADIOLOGY | Facility: MEDICAL CENTER | Age: 66
DRG: 853 | End: 2021-04-12
Attending: NURSE PRACTITIONER
Payer: MEDICARE

## 2021-04-12 LAB
ANION GAP SERPL CALC-SCNC: 10 MMOL/L (ref 7–16)
BUN SERPL-MCNC: 12 MG/DL (ref 8–22)
CALCIUM SERPL-MCNC: 9.2 MG/DL (ref 8.5–10.5)
CHLORIDE SERPL-SCNC: 102 MMOL/L (ref 96–112)
CO2 SERPL-SCNC: 25 MMOL/L (ref 20–33)
CREAT SERPL-MCNC: 0.76 MG/DL (ref 0.5–1.4)
GLUCOSE SERPL-MCNC: 98 MG/DL (ref 65–99)
POTASSIUM SERPL-SCNC: 4 MMOL/L (ref 3.6–5.5)
SODIUM SERPL-SCNC: 137 MMOL/L (ref 135–145)

## 2021-04-12 PROCEDURE — 36415 COLL VENOUS BLD VENIPUNCTURE: CPT

## 2021-04-12 PROCEDURE — A9270 NON-COVERED ITEM OR SERVICE: HCPCS | Performed by: NURSE PRACTITIONER

## 2021-04-12 PROCEDURE — 97605 NEG PRS WND THER DME<=50SQCM: CPT

## 2021-04-12 PROCEDURE — 700102 HCHG RX REV CODE 250 W/ 637 OVERRIDE(OP): Performed by: NURSE PRACTITIONER

## 2021-04-12 PROCEDURE — 770001 HCHG ROOM/CARE - MED/SURG/GYN PRIV*

## 2021-04-12 PROCEDURE — 80048 BASIC METABOLIC PNL TOTAL CA: CPT

## 2021-04-12 PROCEDURE — 700105 HCHG RX REV CODE 258: Performed by: NURSE PRACTITIONER

## 2021-04-12 PROCEDURE — 302098 PASTE RING (FLAT): Performed by: NURSE PRACTITIONER

## 2021-04-12 RX ADMIN — DIVALPROEX SODIUM 125 MG: 125 CAPSULE ORAL at 13:54

## 2021-04-12 RX ADMIN — RISPERIDONE 0.5 MG: 0.5 TABLET ORAL at 05:45

## 2021-04-12 RX ADMIN — MORPHINE SULFATE 15 MG: 15 TABLET, FILM COATED, EXTENDED RELEASE ORAL at 05:45

## 2021-04-12 RX ADMIN — OXYCODONE 5 MG: 5 TABLET ORAL at 08:45

## 2021-04-12 RX ADMIN — METHOCARBAMOL TABLETS 500 MG: 500 TABLET, COATED ORAL at 17:09

## 2021-04-12 RX ADMIN — GABAPENTIN 300 MG: 300 CAPSULE ORAL at 05:45

## 2021-04-12 RX ADMIN — GABAPENTIN 300 MG: 300 CAPSULE ORAL at 17:09

## 2021-04-12 RX ADMIN — MORPHINE SULFATE 15 MG: 15 TABLET, FILM COATED, EXTENDED RELEASE ORAL at 17:09

## 2021-04-12 RX ADMIN — RISPERIDONE 1 MG: 1 TABLET ORAL at 17:09

## 2021-04-12 RX ADMIN — TRAZODONE HYDROCHLORIDE 50 MG: 100 TABLET ORAL at 22:13

## 2021-04-12 RX ADMIN — GABAPENTIN 300 MG: 300 CAPSULE ORAL at 13:54

## 2021-04-12 RX ADMIN — AMLODIPINE BESYLATE 5 MG: 5 TABLET ORAL at 05:45

## 2021-04-12 RX ADMIN — DIVALPROEX SODIUM 125 MG: 125 CAPSULE ORAL at 05:45

## 2021-04-12 RX ADMIN — METHOCARBAMOL TABLETS 500 MG: 500 TABLET, COATED ORAL at 22:13

## 2021-04-12 RX ADMIN — HYDROXYZINE HYDROCHLORIDE 25 MG: 50 TABLET, FILM COATED ORAL at 23:51

## 2021-04-12 RX ADMIN — SODIUM CHLORIDE 1000 ML: 9 INJECTION, SOLUTION INTRAVENOUS at 08:45

## 2021-04-12 RX ADMIN — DIVALPROEX SODIUM 125 MG: 125 CAPSULE ORAL at 22:13

## 2021-04-12 RX ADMIN — METHOCARBAMOL TABLETS 500 MG: 500 TABLET, COATED ORAL at 05:45

## 2021-04-12 RX ADMIN — METHOCARBAMOL TABLETS 500 MG: 500 TABLET, COATED ORAL at 13:54

## 2021-04-12 RX ADMIN — CYCLOBENZAPRINE 10 MG: 10 TABLET, FILM COATED ORAL at 23:51

## 2021-04-12 RX ADMIN — IBUPROFEN 600 MG: 600 TABLET, FILM COATED ORAL at 23:51

## 2021-04-12 RX ADMIN — OXYCODONE 5 MG: 5 TABLET ORAL at 22:14

## 2021-04-12 ASSESSMENT — PAIN DESCRIPTION - PAIN TYPE
TYPE: ACUTE PAIN
TYPE: ACUTE PAIN

## 2021-04-12 NOTE — PROGRESS NOTES
Multiple attempts by several nurses have been made to gain IV access. Attempts have been unsuccessful. IV still pending.

## 2021-04-12 NOTE — PROGRESS NOTES
Order has been placed to start NS @ 150mls for patient. Patient does not have current IV access; order was discontinued; nursing communication indicated it was okay to not have IV previously. Paging hospitalist on call Dr. Jorge for orders for IV placement.

## 2021-04-12 NOTE — WOUND TEAM
[facility]  Inpatient Services  Wound and Skin Care Progress Note    Admission Date: 8/7/2020     Last order of IP CONSULT TO WOUND CARE was found on 9/1/2020 from Hospital Encounter on 8/7/2020     HPI, PMH, SH: Reviewed    Unit where seen by Wound Team: S144/00    WOUND CONSULT/FOLLOW UP RELATED TO:  Left leg scheduled negative pressure wound therapy (npwt) dressing change and 2 layer compression wrap change.    WOUND TYPE, LOCATION, CHARACTERISTICS (Pressure Injuries: location, stage, POA or date identified)     Negative Pressure Wound Therapy 11/20/20 Leg Lateral;Posterior;Medial Left (Active)   Vacuum Serial Number [id number] 04/05/21 1000   NPWT Pump Mode / Pressure Setting Ulta;Continuous;125 mmHg    Dressing Type Non-adherent;Medium;Black Foam (Regular)    Number of Foam Pieces Used 6    Canister Changed No    Output (mL) 0 mL    NEXT Dressing Change/Treatment Date 04/14/21    WOUND NURSE ONLY - Time Spent with Patient (mins) 120      Wound 11/19/20 Full Thickness Wound Leg Lateral;Posterior;Medial Left (Active)   Wound Image       04/09/21 2200   Site Assessment Yellow;Red    Periwound Assessment Scar tissue    Margins Attached edges;Defined edges    Closure Secondary intention;Adhesive bandage    Drainage Amount Scant    Drainage Description Serosanguineous    Treatments Cleansed;Site care;Offloading;Compression    Wound Cleansing Approved Wound Cleanser    Periwound Protectant Skin Protectant Wipes to Periwound;Paste Ring;Drape    Dressing Cleansing/Solutions Not Applicable    Dressing Options Adaptic;Wound Vac;Mepilex;Compression Wrap Two Layer    Dressing Changed Changed    Dressing Status Clean;Dry;Intact    Dressing Change/Treatment Frequency Monday, Wednesday, Friday, and As Needed    NEXT Dressing Change/Treatment Date 04/14/21    NEXT Weekly Photo (Inpatient Only) 04/16/21    Non-staged Wound Description Full thickness    Wound Length (cm) 12.6 cm    Wound Width (cm) 3 cm    Wound  Depth (cm) 0.3 cm    Wound Surface Area (cm^2) 37.8 cm^2    Wound Volume (cm^3) 11.34 cm^3    Post-Procedure Length (cm) 15 cm    Post-Procedure Width (cm) 3.4 cm    Post-Procedure Depth (cm) 0.2 cm    Post-Procedure Surface Area (cm^2) 51 cm^2    Post-Procedure Volume (cm^3) 10.2 cm^3    Wound Healing % -11    Wound Bed Granulation (%) 100 %    Wound Bed Slough (%) 10 %    Wound Bed Granulation (%) - Post-Procedure 100 %    Tunneling (cm) 0 cm    Undermining (cm) 0 cm    Shape Irregular x2    Wound Odor Mild    Pulses Left;2+;DP;PT    Exposed Structures None    WOUND NURSE ONLY - Time Spent with Patient (mins) 90       Lab Values:    Lab Results   Component Value Date/Time    WBC 5.9 04/11/2021 08:54 AM    RBC 4.52 (L) 04/11/2021 08:54 AM    HEMOGLOBIN 12.2 (L) 04/11/2021 08:54 AM    HEMATOCRIT 38.0 (L) 04/11/2021 08:54 AM    CREACTPROT 1.07 (H) 08/12/2020 01:55 PM    SEDRATEWES 85 (H) 08/07/2020 01:20 PM    HBA1C 5.7 (H) 11/09/2018 06:30 PM      Culture Results show:  Recent Results (from the past 720 hour(s))   CULTURE WOUND W/ GRAM STAIN    Collection Time: 09/01/20  2:00 PM    Specimen: Left Leg; Wound   Result Value Ref Range    Significant Indicator POS (POS)     Source WND     Site LEFT LEG     Culture Result - (A)     Gram Stain Result       Moderate Gram positive cocci.  Moderate Gram positive rods.      Culture Result (A)      Beta Hemolytic Streptococcus group A  Moderate growth      Culture Result (A)      Methicillin Resistant Staphylococcus aureus  Moderate growth      Culture Result (A)      Diphtheroids  Moderate growth  Mixed morphologies.     KOSTAS: 9/20/21   Left.    Doppler waveforms of the common femoral artery are of high amplitude and    triphasic.    Doppler waveforms at the ankle are brisk and triphasic.    Ankle-brachial index is normal.    Unable to obtained popliteal waveforms due to wound bandages.     Self Report / Pain Level: Pre-medicated     INTERVENTIONS BY WOUND TEAM: INTERVENTIONS  BY WOUND TEAM:  Performed standard wound care which includes appropriate positioning, dressing removal and non-selective debridement. Pictures and measurements obtained weekly if/when required.    Cleansed: Wound cleanser and gauze used to clean wound beds and periwound  Debridement: NA  Periwound: Cleansed with cleanser and gauze. Prepped with no sting and paste rings. Drape bridge up along upper medial and lateral thigh for trac pad.    Primary - Cut to fit adaptic applied over bilateral wounds  Lateral: Two pieces of half thickness cut to fit applied over wound. 3rd piece of half thickness spiraled black foam along drape bridge. 4th piece of half thickness circular black foam as button for trac pad. All secured with drape. Trac pad applied. Y connected trac pad to medial and suction resumed.  Medial: One piece of cut to fit half thickness black foam applied into wound bed. Secured with drape. A second piece of half thickness spiraled black foam applied along medial drape bridge. 3rd piece of half thickness circular black foam as button for trac pad. Trac pad applied. Suction initiated.     Secondary - Fenestrated mepilex around bilateral trac pads. 2 layer compression wrap applied.    MEASUREMENTS:     Pre-procedure:   Medial 13.5 x 2.2 x 0  Lateral 15 x 3.5 x 0.1    Post-procedure:   Medial 13x 3.5 x 0  Lateral 14 x 4 x 0.1    Interdisciplinary consultation: Patient, Bedside RN, Sitter, Wound RN (Sapna)    EVALUATION / RATIONALE FOR TREATMENT:  4/12/21: Medial wound appears to have decreased in size. Biologic being applied by LPS APRN weekly. Per LPS will reapply Biologic on Friday 4/16/21.     4/9: debridement and biologic placement performed by LPS APN. Adaptic to remain in place during next change.  4/7: Biologic to be reapplied this week by LPS with possible debridement if deemed appropriate.  Wounds remain stable and appear to be to surface level. Y connect on tracpad still in use.   4/4/21: Received call  pt had pulled trac pad off. Whole dressing changed. Y connected wounds with 2 separate trac pads to each wound to decrease risk of loss of NPWT. Bridged lateral trac pad up along thigh to keep out of compressed area to ease replacement if pt pulls off and making it more difficult for pt to dislodge.   4/2/2021 - Debridement performed by APRN as patient's wounds continue to fail to contract. Biologic in place to bilateral wound beds and will be applied by RAFITA Goldberg again next Friday 4/11/2021. During next vac change, adaptic to remain in place as it is protecting the biologic underneath.   3/30/21: Wound odor mild-moderate and wound bed bright red, now 100% granulation tissue.  Meredith-skin continues to be fragile/flakey; protected with hydrocolloid dressing to wound border.    3/26/21: Foul odor present at dressing removal, but significantly decreased after cleansing.  Continue current POC.  3/24/21 Wound bed red, with less drainage noted.  Strong, foul odor still present.  APRN to see pt regarding wound bed. Continue with current plan of care.   3/22/21: Area unchanged from previous dressing change.  Wound malodorous.  Meredith-skin dry and flaky at superior wound border, hydocolloid thin dressing placed.   3/13/21 Wounds failing to contract.  Will trial intermittent wound vac setting to encourage greater stimulation of cellular activity in wound bed.    3/10/2021: debrided wound, odor present, continue NPWT  3/6/12 area unchanged, odor persists  3/1/21: Lidocaine unavailable during this dressing change. Small amount of debridement completed with curette. Continued with silver powder for its antimicrobial properties.   2/27/21 skin bridge enlarging  2/24/2021: Excisional debridement by LPS APRN performed for rolled edges that were starting to form along the posterior and medial left leg wound. Moderate bleeding managed by administration of lidocaine injection. CSWD performed for slough. Wound bed is beefy red  following debridement. Resume agata and silver foam to manage bioburden. Continue with CSWD with each dressing change.   2/22/2021: Rolled edges starting to form along posterior thigh wound bed - may require excisional debridement by provider. Minimal changes to actual wound bed. Continue CSWD, agata, and silver foam to manage bioburden.   2/19/21: minimal to no changes from last assessment. Continue CSWD, agata, and silver foam for bioburden.       Goals: Steady decrease in wound area and depth weekly.    NURSING PLAN OF CARE ORDERS (X):    Dressing changes: See Dressing Care orders: X  Skin care: See Skin Care orders:   Rectal tube care: See Rectal Tube Care orders:   Other orders:      WOUND TEAM PLAN OF CARE:   Dressing changes by wound team:        Follow up 3 times weekly:                NPWT change 3 times weekly:  X,  NPWT changes 3x/ weekly with 2 layer compression wrap.  Follow up 1-2 times weekly:      Follow up Bi-Monthly:                   Follow up as needed:       Other (explain):     Anticipated discharge plans:  LTACH:        SNF/Rehab:       Home Health Care:           Outpatient Wound Center:            Self Care:           Other- GH, unsure if they can manage wound vac. May need to DC with compression wrap only. TBD.

## 2021-04-12 NOTE — DISCHARGE PLANNING
Anticipated Discharge Disposition: Group home with home health    Action: JOAQUÍN RN followed up with patient's guardian Coretta Grady to ask about progress with the group home search.  Coretta stated that she is waiting to hear back from [facility], and that she should get an answer about bed availability by the end of tomorrow.     Barriers to Discharge: GH acceptance    Plan: Case coordination to continue to follow up with patient's guardian to discuss group home placement.

## 2021-04-12 NOTE — CARE PLAN
Problem: Safety  Goal: Will remain free from falls  Description: Pt mobilizes frequently independently.   Outcome: PROGRESSING AS EXPECTED  Note: Safety sitter is present due to impulsive behavior from patient.     Problem: Knowledge Deficit  Goal: Knowledge of disease process/condition, treatment plan, diagnostic tests, and medications will improve  Outcome: PROGRESSING AS EXPECTED  Note: Patient requires constant reinforcement of treatment plan as patient is confused at baseline.

## 2021-04-13 LAB
BASOPHILS # BLD AUTO: 0.6 % (ref 0–1.8)
BASOPHILS # BLD: 0.04 K/UL (ref 0–0.12)
EOSINOPHIL # BLD AUTO: 0.15 K/UL (ref 0–0.51)
EOSINOPHIL NFR BLD: 2.1 % (ref 0–6.9)
ERYTHROCYTE [DISTWIDTH] IN BLOOD BY AUTOMATED COUNT: 46.7 FL (ref 35.9–50)
HCT VFR BLD AUTO: 34.8 % (ref 42–52)
HGB BLD-MCNC: 11.2 G/DL (ref 14–18)
IMM GRANULOCYTES # BLD AUTO: 0.04 K/UL (ref 0–0.11)
IMM GRANULOCYTES NFR BLD AUTO: 0.6 % (ref 0–0.9)
LYMPHOCYTES # BLD AUTO: 1.79 K/UL (ref 1–4.8)
LYMPHOCYTES NFR BLD: 25.1 % (ref 22–41)
MCH RBC QN AUTO: 26.7 PG (ref 27–33)
MCHC RBC AUTO-ENTMCNC: 32.2 G/DL (ref 33.7–35.3)
MCV RBC AUTO: 82.9 FL (ref 81.4–97.8)
MONOCYTES # BLD AUTO: 1.03 K/UL (ref 0–0.85)
MONOCYTES NFR BLD AUTO: 14.4 % (ref 0–13.4)
NEUTROPHILS # BLD AUTO: 4.09 K/UL (ref 1.82–7.42)
NEUTROPHILS NFR BLD: 57.2 % (ref 44–72)
NRBC # BLD AUTO: 0 K/UL
NRBC BLD-RTO: 0 /100 WBC
PLATELET # BLD AUTO: 336 K/UL (ref 164–446)
PMV BLD AUTO: 10.1 FL (ref 9–12.9)
RBC # BLD AUTO: 4.2 M/UL (ref 4.7–6.1)
WBC # BLD AUTO: 7.1 K/UL (ref 4.8–10.8)

## 2021-04-13 PROCEDURE — 700102 HCHG RX REV CODE 250 W/ 637 OVERRIDE(OP): Performed by: NURSE PRACTITIONER

## 2021-04-13 PROCEDURE — A9270 NON-COVERED ITEM OR SERVICE: HCPCS | Performed by: NURSE PRACTITIONER

## 2021-04-13 PROCEDURE — 85025 COMPLETE CBC W/AUTO DIFF WBC: CPT

## 2021-04-13 PROCEDURE — 36415 COLL VENOUS BLD VENIPUNCTURE: CPT

## 2021-04-13 PROCEDURE — A9270 NON-COVERED ITEM OR SERVICE: HCPCS | Performed by: STUDENT IN AN ORGANIZED HEALTH CARE EDUCATION/TRAINING PROGRAM

## 2021-04-13 PROCEDURE — 770001 HCHG ROOM/CARE - MED/SURG/GYN PRIV*

## 2021-04-13 PROCEDURE — 700101 HCHG RX REV CODE 250: Performed by: NURSE PRACTITIONER

## 2021-04-13 PROCEDURE — 700102 HCHG RX REV CODE 250 W/ 637 OVERRIDE(OP): Performed by: STUDENT IN AN ORGANIZED HEALTH CARE EDUCATION/TRAINING PROGRAM

## 2021-04-13 RX ORDER — QUETIAPINE FUMARATE 25 MG/1
25 TABLET, FILM COATED ORAL ONCE
Status: COMPLETED | OUTPATIENT
Start: 2021-04-13 | End: 2021-04-13

## 2021-04-13 RX ADMIN — LIDOCAINE 1 PATCH: 50 PATCH CUTANEOUS at 14:34

## 2021-04-13 RX ADMIN — DIVALPROEX SODIUM 125 MG: 125 CAPSULE ORAL at 14:34

## 2021-04-13 RX ADMIN — HYDROXYZINE HYDROCHLORIDE 25 MG: 50 TABLET, FILM COATED ORAL at 23:51

## 2021-04-13 RX ADMIN — MORPHINE SULFATE 15 MG: 15 TABLET, FILM COATED, EXTENDED RELEASE ORAL at 16:40

## 2021-04-13 RX ADMIN — METHOCARBAMOL TABLETS 500 MG: 500 TABLET, COATED ORAL at 07:34

## 2021-04-13 RX ADMIN — GABAPENTIN 300 MG: 300 CAPSULE ORAL at 14:34

## 2021-04-13 RX ADMIN — OXYCODONE 5 MG: 5 TABLET ORAL at 23:51

## 2021-04-13 RX ADMIN — MORPHINE SULFATE 15 MG: 15 TABLET, FILM COATED, EXTENDED RELEASE ORAL at 07:34

## 2021-04-13 RX ADMIN — AMLODIPINE BESYLATE 5 MG: 5 TABLET ORAL at 07:36

## 2021-04-13 RX ADMIN — RISPERIDONE 0.5 MG: 0.5 TABLET ORAL at 07:34

## 2021-04-13 RX ADMIN — DIVALPROEX SODIUM 125 MG: 125 CAPSULE ORAL at 07:34

## 2021-04-13 RX ADMIN — CYCLOBENZAPRINE 10 MG: 10 TABLET, FILM COATED ORAL at 21:55

## 2021-04-13 RX ADMIN — DOCUSATE SODIUM 50 MG AND SENNOSIDES 8.6 MG 2 TABLET: 8.6; 5 TABLET, FILM COATED ORAL at 16:40

## 2021-04-13 RX ADMIN — RISPERIDONE 1 MG: 1 TABLET ORAL at 16:39

## 2021-04-13 RX ADMIN — METHOCARBAMOL TABLETS 500 MG: 500 TABLET, COATED ORAL at 14:34

## 2021-04-13 RX ADMIN — DIVALPROEX SODIUM 125 MG: 125 CAPSULE ORAL at 21:55

## 2021-04-13 RX ADMIN — METHOCARBAMOL TABLETS 500 MG: 500 TABLET, COATED ORAL at 16:39

## 2021-04-13 RX ADMIN — QUETIAPINE FUMARATE 25 MG: 25 TABLET ORAL at 02:51

## 2021-04-13 RX ADMIN — GABAPENTIN 300 MG: 300 CAPSULE ORAL at 07:34

## 2021-04-13 RX ADMIN — METHOCARBAMOL TABLETS 500 MG: 500 TABLET, COATED ORAL at 21:55

## 2021-04-13 RX ADMIN — GABAPENTIN 300 MG: 300 CAPSULE ORAL at 16:39

## 2021-04-13 ASSESSMENT — PAIN DESCRIPTION - PAIN TYPE: TYPE: ACUTE PAIN

## 2021-04-13 NOTE — CARE PLAN
Problem: Safety  Goal: Will remain free from injury  Outcome: PROGRESSING AS EXPECTED  Note: Discussed patient mobility status with interdisciplinary team, fall precautions in place, pt fall prevention education done, pt educated to call for assistance when needed. One to one sitter in place.  Goal: Will remain free from falls  Description: Pt mobilizes frequently independently.   Outcome: PROGRESSING AS EXPECTED     Problem: Discharge Barriers/Planning  Goal: Patient's continuum of care needs will be met  Outcome: PROGRESSING AS EXPECTED  Note: Awaiting group home acceptance, availability,      Problem: Pain Management  Goal: Pain level will decrease to patient's comfort goal  Outcome: PROGRESSING AS EXPECTED  Note: Current pain regimen effective on getting patients pain level under control as needed, per patient. Educated on alternative pain relief therapies such as music, heat/cold, TV as distraction, socializing, and controlled breathing techniques. Current pain is 0/10       Problem: Mobility  Goal: Risk for activity intolerance will decrease  Outcome: PROGRESSING AS EXPECTED  Note: Discussed plan to mobilize in room, active range of motion with resistance, educated on benefits of mobilization, risks of activity intolerance.

## 2021-04-14 PROCEDURE — A9270 NON-COVERED ITEM OR SERVICE: HCPCS | Performed by: NURSE PRACTITIONER

## 2021-04-14 PROCEDURE — 302098 PASTE RING (FLAT): Performed by: NURSE PRACTITIONER

## 2021-04-14 PROCEDURE — 770001 HCHG ROOM/CARE - MED/SURG/GYN PRIV*

## 2021-04-14 PROCEDURE — 700102 HCHG RX REV CODE 250 W/ 637 OVERRIDE(OP): Performed by: NURSE PRACTITIONER

## 2021-04-14 PROCEDURE — 97608 NEG PRS WND THER NDME>50SQCM: CPT

## 2021-04-14 PROCEDURE — 700101 HCHG RX REV CODE 250: Performed by: NURSE PRACTITIONER

## 2021-04-14 RX ADMIN — GABAPENTIN 300 MG: 300 CAPSULE ORAL at 04:27

## 2021-04-14 RX ADMIN — METHOCARBAMOL TABLETS 500 MG: 500 TABLET, COATED ORAL at 19:50

## 2021-04-14 RX ADMIN — OXYCODONE 5 MG: 5 TABLET ORAL at 14:33

## 2021-04-14 RX ADMIN — RISPERIDONE 0.5 MG: 0.5 TABLET ORAL at 04:26

## 2021-04-14 RX ADMIN — LIDOCAINE 1 PATCH: 50 PATCH CUTANEOUS at 12:19

## 2021-04-14 RX ADMIN — METHOCARBAMOL TABLETS 500 MG: 500 TABLET, COATED ORAL at 04:27

## 2021-04-14 RX ADMIN — GABAPENTIN 300 MG: 300 CAPSULE ORAL at 12:19

## 2021-04-14 RX ADMIN — METHOCARBAMOL TABLETS 500 MG: 500 TABLET, COATED ORAL at 17:50

## 2021-04-14 RX ADMIN — AMLODIPINE BESYLATE 5 MG: 5 TABLET ORAL at 04:26

## 2021-04-14 RX ADMIN — DIVALPROEX SODIUM 125 MG: 125 CAPSULE ORAL at 19:50

## 2021-04-14 RX ADMIN — MORPHINE SULFATE 15 MG: 15 TABLET, FILM COATED, EXTENDED RELEASE ORAL at 17:50

## 2021-04-14 RX ADMIN — OXYCODONE 5 MG: 5 TABLET ORAL at 20:53

## 2021-04-14 RX ADMIN — TRAZODONE HYDROCHLORIDE 50 MG: 100 TABLET ORAL at 19:50

## 2021-04-14 RX ADMIN — MORPHINE SULFATE 15 MG: 15 TABLET, FILM COATED, EXTENDED RELEASE ORAL at 04:27

## 2021-04-14 RX ADMIN — DOCUSATE SODIUM 50 MG AND SENNOSIDES 8.6 MG 2 TABLET: 8.6; 5 TABLET, FILM COATED ORAL at 17:50

## 2021-04-14 RX ADMIN — METHOCARBAMOL TABLETS 500 MG: 500 TABLET, COATED ORAL at 12:19

## 2021-04-14 RX ADMIN — RISPERIDONE 1 MG: 1 TABLET ORAL at 17:50

## 2021-04-14 RX ADMIN — GABAPENTIN 300 MG: 300 CAPSULE ORAL at 17:50

## 2021-04-14 RX ADMIN — DIVALPROEX SODIUM 125 MG: 125 CAPSULE ORAL at 04:26

## 2021-04-14 ASSESSMENT — PAIN DESCRIPTION - PAIN TYPE
TYPE: ACUTE PAIN
TYPE: ACUTE PAIN

## 2021-04-14 NOTE — DISCHARGE PLANNING
Anticipated Discharge Disposition: Group home    Action: JOAQUÍN RN called and left a voicemail for patient's public guardian Coretta Grady to follow up on group home progress, requested call back.    Received call back from Coretta.  [facility] would like to assess the patient.  JOAQUÍN RN gave the ok for Coretta to give them JOAQUÍN RN's phone number.  JOAQUÍN RN awaiting call from group home.    Barriers to Discharge: Group home placement, HH acceptance    Plan: Case coordination to follow up with public guardian.

## 2021-04-14 NOTE — WOUND TEAM
[facility]  Inpatient Services  Wound and Skin Care Progress Note    Admission Date: 8/7/2020     Last order of IP CONSULT TO WOUND CARE was found on 9/1/2020 from Hospital Encounter on 8/7/2020     HPI, PMH, SH: Reviewed    Unit where seen by Wound Team: [id number]    WOUND CONSULT/FOLLOW UP RELATED TO:  Left leg scheduled negative pressure wound therapy (npwt) dressing change and 2 layer compression wrap change.    WOUND TYPE, LOCATION, CHARACTERISTICS (Pressure Injuries: location, stage, POA or date identified)      Negative Pressure Wound Therapy 11/20/20 Leg Lateral;Posterior;Medial Left (Active)   Vacuum Serial Number [id number]    NPWT Pump Mode / Pressure Setting Ulta;125 mmHg    Dressing Type Medium;Black Foam (Regular)    Number of Foam Pieces Used 6    Canister Changed No    Output (mL) 0 mL    NEXT Dressing Change/Treatment Date 04/16/21    WOUND NURSE ONLY - Time Spent with Patient (mins) 90      Wound 11/19/20 Full Thickness Wound Leg Lateral;Posterior;Medial Left (Active)   Wound Image       04/09/21 2200   Site Assessment Pink;Red    Periwound Assessment Scar tissue    Margins Attached edges    Closure None    Drainage Amount Small    Drainage Description Yellow;Serosanguineous    Treatments Cleansed;Site care;Compression    Wound Cleansing Approved Wound Cleanser    Periwound Protectant Paste Ring;Benzoin    Dressing Cleansing/Solutions Not Applicable    Dressing Options Wound Vac    Dressing Changed Changed    Dressing Status Clean;Dry;Intact    Dressing Change/Treatment Frequency Monday, Wednesday, Friday, and As Needed    NEXT Dressing Change/Treatment Date 04/16/21    NEXT Weekly Photo (Inpatient Only) 04/16/21    Non-staged Wound Description Full thickness    Wound Length (cm) 12.6 cm    Wound Width (cm) 3 cm    Wound Depth (cm) 0.3 cm    Wound Surface Area (cm^2) 37.8 cm^2    Wound Volume (cm^3) 11.34 cm^3    Post-Procedure Length (cm) 15 cm    Post-Procedure Width (cm) 3.4 cm     Post-Procedure Depth (cm) 0.2 cm    Post-Procedure Surface Area (cm^2) 51 cm^2    Post-Procedure Volume (cm^3) 10.2 cm^3    Wound Healing % -11    Wound Bed Granulation (%) 100 %    Wound Bed Slough (%) 10 %    Wound Bed Granulation (%) - Post-Procedure 100 %    Tunneling (cm) 0 cm    Undermining (cm) 0 cm    Shape Irregular x2    Wound Odor Mild    Pulses Left;2+;DP;PT    Exposed Structures None    WOUND NURSE ONLY - Time Spent with Patient (mins) 60       Lab Values:    Lab Results   Component Value Date/Time    WBC 7.1 04/13/2021 03:45 AM    RBC 4.20 (L) 04/13/2021 03:45 AM    HEMOGLOBIN 11.2 (L) 04/13/2021 03:45 AM    HEMATOCRIT 34.8 (L) 04/13/2021 03:45 AM    CREACTPROT 1.07 (H) 08/12/2020 01:55 PM    SEDRATEWES 85 (H) 08/07/2020 01:20 PM    HBA1C 5.7 (H) 11/09/2018 06:30 PM      Culture Results show:  Recent Results (from the past 720 hour(s))   CULTURE WOUND W/ GRAM STAIN    Collection Time: 09/01/20  2:00 PM    Specimen: Left Leg; Wound   Result Value Ref Range    Significant Indicator POS (POS)     Source WND     Site LEFT LEG     Culture Result - (A)     Gram Stain Result       Moderate Gram positive cocci.  Moderate Gram positive rods.      Culture Result (A)      Beta Hemolytic Streptococcus group A  Moderate growth      Culture Result (A)      Methicillin Resistant Staphylococcus aureus  Moderate growth      Culture Result (A)      Diphtheroids  Moderate growth  Mixed morphologies.     KOSTAS: 9/20/21   Left.    Doppler waveforms of the common femoral artery are of high amplitude and    triphasic.    Doppler waveforms at the ankle are brisk and triphasic.    Ankle-brachial index is normal.    Unable to obtained popliteal waveforms due to wound bandages.     Self Report / Pain Level: Pre-medicated     INTERVENTIONS BY WOUND TEAM: INTERVENTIONS BY WOUND TEAM:  Performed standard wound care which includes appropriate positioning, dressing removal and non-selective debridement. Pictures and measurements  obtained weekly if/when required.    Cleansed: Wound cleanser and gauze used to clean wound beds  Debridement: NA  Periwound: Cleansed with cleanser and gauze. Prepped with benzoin and paste rings. Drape bridge up along upper medial and lateral thigh for trac pad.    Primary - Cut to fit adaptic applied over bilateral wounds  Lateral: Two pieces of half thickness cut to fit applied over each wound. piece of half thickness spiraled black foam along drape bridge. piece of half thickness circular black foam as button for trac pad. All secured with drape. Trac pad applied. Y connected trac pad to medial and suction resumed.  Medial: One piece of cut to fit half thickness black foam applied into wound bed. Secured with drape. A second piece of half thickness spiraled black foam applied along medial drape bridge. 3rd piece of half thickness circular black foam as button for trac pad. Trac pad applied. Suction initiated.     Secondary - Fenestrated mepilex around bilateral trac pads. 2 layer compression wrap applied.    MEASUREMENTS:     Pre-procedure:   Medial 13.5 x 2.2 x 0  Lateral 15 x 3.5 x 0.1    Post-procedure:   Medial 13x 3.5 x 0  Lateral 14 x 4 x 0.1    Interdisciplinary consultation: Patient, Bedside RN, Sitter, Wound RN (Sapna)    EVALUATION / RATIONALE FOR TREATMENT:  04/14/21:  No biologic to be applied this week due to approval issues.  No change in wound.   4/12/21: Medial wound appears to have decreased in size. Biologic being applied by LPS APRN weekly. Per LPS will reapply Biologic on Friday 4/16/21.   4/9: debridement and biologic placement performed by LPS APN. Adaptic to remain in place during next change.  4/7: Biologic to be reapplied this week by LPS with possible debridement if deemed appropriate.  Wounds remain stable and appear to be to surface level. Y connect on tracpad still in use.   4/4/21: Received call pt had pulled trac pad off. Whole dressing changed. Y connected wounds with 2 separate  trac pads to each wound to decrease risk of loss of NPWT. Bridged lateral trac pad up along thigh to keep out of compressed area to ease replacement if pt pulls off and making it more difficult for pt to dislodge.   4/2/2021 - Debridement performed by APRN as patient's wounds continue to fail to contract. Biologic in place to bilateral wound beds and will be applied by RAFITA Goldberg again next Friday 4/11/2021. During next vac change, adaptic to remain in place as it is protecting the biologic underneath.   3/30/21: Wound odor mild-moderate and wound bed bright red, now 100% granulation tissue.  Meredith-skin continues to be fragile/flakey; protected with hydrocolloid dressing to wound border.    3/26/21: Foul odor present at dressing removal, but significantly decreased after cleansing.  Continue current POC.  3/24/21 Wound bed red, with less drainage noted.  Strong, foul odor still present.  APRN to see pt regarding wound bed. Continue with current plan of care.   3/22/21: Area unchanged from previous dressing change.  Wound malodorous.  Meredith-skin dry and flaky at superior wound border, hydocolloid thin dressing placed.   3/13/21 Wounds failing to contract.  Will trial intermittent wound vac setting to encourage greater stimulation of cellular activity in wound bed.    3/10/2021: debrided wound, odor present, continue NPWT  3/6/12 area unchanged, odor persists  3/1/21: Lidocaine unavailable during this dressing change. Small amount of debridement completed with curette. Continued with silver powder for its antimicrobial properties.   2/27/21 skin bridge enlarging  2/24/2021: Excisional debridement by LPS APRN performed for rolled edges that were starting to form along the posterior and medial left leg wound. Moderate bleeding managed by administration of lidocaine injection. CSWD performed for slough. Wound bed is beefy red following debridement. Resume agata and silver foam to manage bioburden. Continue with CSWD  with each dressing change.   2/22/2021: Rolled edges starting to form along posterior thigh wound bed - may require excisional debridement by provider. Minimal changes to actual wound bed. Continue CSWD, agata, and silver foam to manage bioburden.   2/19/21: minimal to no changes from last assessment. Continue CSWD, agata, and silver foam for bioburden.       Goals: Steady decrease in wound area and depth weekly.    NURSING PLAN OF CARE ORDERS (X):    Dressing changes: See Dressing Care orders: X  Skin care: See Skin Care orders:   Rectal tube care: See Rectal Tube Care orders:   Other orders:      WOUND TEAM PLAN OF CARE:   Dressing changes by wound team:        Follow up 3 times weekly:                NPWT change 3 times weekly:  X,  NPWT changes 3x/ weekly with 2 layer compression wrap.  Follow up 1-2 times weekly:      Follow up Bi-Monthly:                   Follow up as needed:       Other (explain):     Anticipated discharge plans:  LTACH:        SNF/Rehab:       Home Health Care:           Outpatient Wound Center:            Self Care:           Other- [facility], unsure if they can manage wound vac. May need to DC with compression wrap only. TBD.

## 2021-04-14 NOTE — CARE PLAN
Problem: Safety  Goal: Will remain free from injury  Outcome: PROGRESSING AS EXPECTED  Goal: Will remain free from falls  Description: Pt mobilizes frequently independently.   Outcome: PROGRESSING AS EXPECTED  Note: Safety sitter at bedside, patient educated on fall precautions. Refused bed alarm-makes extremely agitated but is wearing socks and has appropriate signage outside door.     Problem: Discharge Barriers/Planning  Goal: Patient's continuum of care needs will be met  Outcome: PROGRESSING SLOWER THAN EXPECTED     Problem: Mobility  Goal: Risk for activity intolerance will decrease  Outcome: PROGRESSING AS EXPECTED  Note: Patient getting out of bed to use bathroom frequently. Stand by assistance provided but patient pretty strong and steady.

## 2021-04-15 PROCEDURE — 700102 HCHG RX REV CODE 250 W/ 637 OVERRIDE(OP): Performed by: NURSE PRACTITIONER

## 2021-04-15 PROCEDURE — 700101 HCHG RX REV CODE 250: Performed by: NURSE PRACTITIONER

## 2021-04-15 PROCEDURE — A9270 NON-COVERED ITEM OR SERVICE: HCPCS | Performed by: NURSE PRACTITIONER

## 2021-04-15 PROCEDURE — 770001 HCHG ROOM/CARE - MED/SURG/GYN PRIV*

## 2021-04-15 RX ADMIN — METHOCARBAMOL TABLETS 500 MG: 500 TABLET, COATED ORAL at 20:03

## 2021-04-15 RX ADMIN — GABAPENTIN 300 MG: 300 CAPSULE ORAL at 17:50

## 2021-04-15 RX ADMIN — GABAPENTIN 300 MG: 300 CAPSULE ORAL at 05:58

## 2021-04-15 RX ADMIN — METHOCARBAMOL TABLETS 500 MG: 500 TABLET, COATED ORAL at 05:58

## 2021-04-15 RX ADMIN — TRAZODONE HYDROCHLORIDE 50 MG: 100 TABLET ORAL at 20:02

## 2021-04-15 RX ADMIN — METHOCARBAMOL TABLETS 500 MG: 500 TABLET, COATED ORAL at 13:04

## 2021-04-15 RX ADMIN — DIVALPROEX SODIUM 125 MG: 125 CAPSULE ORAL at 13:04

## 2021-04-15 RX ADMIN — DIVALPROEX SODIUM 125 MG: 125 CAPSULE ORAL at 05:58

## 2021-04-15 RX ADMIN — LIDOCAINE 1 PATCH: 50 PATCH CUTANEOUS at 13:04

## 2021-04-15 RX ADMIN — AMLODIPINE BESYLATE 5 MG: 5 TABLET ORAL at 05:58

## 2021-04-15 RX ADMIN — DIVALPROEX SODIUM 125 MG: 125 CAPSULE ORAL at 20:03

## 2021-04-15 RX ADMIN — MORPHINE SULFATE 15 MG: 15 TABLET, FILM COATED, EXTENDED RELEASE ORAL at 17:50

## 2021-04-15 RX ADMIN — METHOCARBAMOL TABLETS 500 MG: 500 TABLET, COATED ORAL at 17:50

## 2021-04-15 RX ADMIN — MORPHINE SULFATE 15 MG: 15 TABLET, FILM COATED, EXTENDED RELEASE ORAL at 05:58

## 2021-04-15 RX ADMIN — GABAPENTIN 300 MG: 300 CAPSULE ORAL at 13:04

## 2021-04-15 RX ADMIN — RISPERIDONE 1 MG: 1 TABLET ORAL at 17:50

## 2021-04-15 RX ADMIN — RISPERIDONE 0.5 MG: 0.5 TABLET ORAL at 05:58

## 2021-04-15 RX ADMIN — OXYCODONE 5 MG: 5 TABLET ORAL at 09:45

## 2021-04-15 ASSESSMENT — PAIN DESCRIPTION - PAIN TYPE: TYPE: ACUTE PAIN

## 2021-04-15 NOTE — PROGRESS NOTES
Assumed care of pt at shift change. Pt is on RA with no signs of acute distress. A&Ox2 disoriented to time and place. Medicated with PRN Oxycodone for left hip pain. Wound vac in place. All comfort measures in place. Call light and personal belongings by bedside. Bed locked and in lowest position. Hourly rounding in place.

## 2021-04-15 NOTE — PROGRESS NOTES
2 RN skin check complete.   Devices in place Wound Vac.  Skin assessed under devices YEs.  Confirmed pressure ulcers found on None.  New potential pressure ulcers noted on R ear.   Wound consult placed Yes.  The following interventions in place Removed Mask    Pt has blanchable redness to elbows, and top of R foot, L leg has dressing due to wound vac in place, R ear has scab noted, L ear red but blanchable

## 2021-04-15 NOTE — CARE PLAN
1:1 Sitter in place for safety. Alert but confused at times. DC pending placement, pt has a legal guardian. No tele. DeniesCP or SOB.     Care Plan  Problem: Discharge Barriers/Planning  Goal: Patient's continuum of care needs will be met  Outcome: NOT MET     Problem: Pain Management  Goal: Pain level will decrease to patient's comfort goal  Outcome: PROGRESSING AS EXPECTED

## 2021-04-15 NOTE — DISCHARGE PLANNING
Anticipated Discharge Disposition:   Group Home with home health    Action:   RN CM called pt's Legal Guardian, Coretta Grady (office / cell ). No answer, left voicemail for call back.    Barriers to Discharge:   Group home placement per Legal Guardian  [facility] with  and [facility]  PCP set up  Home Health acceptance    Plan:   Follow up with pt's Legal Guardian.  Hospital Care Management will continue to follow and assist with discharge planning needs.

## 2021-04-15 NOTE — CARE PLAN
Problem: Safety  Goal: Will remain free from falls  Description: Pt mobilizes frequently independently.   Outcome: PROGRESSING AS EXPECTED  Note: Fall precautions in place. Safety sitter in place.      Problem: Pain Management  Goal: Pain level will decrease to patient's comfort goal  Outcome: PROGRESSING AS EXPECTED  Note: Medicated with PRN oxycodone for pain to left hip.

## 2021-04-16 PROCEDURE — A9270 NON-COVERED ITEM OR SERVICE: HCPCS | Performed by: NURSE PRACTITIONER

## 2021-04-16 PROCEDURE — 770001 HCHG ROOM/CARE - MED/SURG/GYN PRIV*

## 2021-04-16 PROCEDURE — 99231 SBSQ HOSP IP/OBS SF/LOW 25: CPT | Performed by: NURSE PRACTITIONER

## 2021-04-16 PROCEDURE — 302098 PASTE RING (FLAT): Performed by: NURSE PRACTITIONER

## 2021-04-16 PROCEDURE — 700101 HCHG RX REV CODE 250: Performed by: NURSE PRACTITIONER

## 2021-04-16 PROCEDURE — 700102 HCHG RX REV CODE 250 W/ 637 OVERRIDE(OP): Performed by: NURSE PRACTITIONER

## 2021-04-16 PROCEDURE — 97606 NEG PRS WND THER DME>50 SQCM: CPT

## 2021-04-16 RX ADMIN — LIDOCAINE 1 PATCH: 50 PATCH CUTANEOUS at 11:55

## 2021-04-16 RX ADMIN — RISPERIDONE 1 MG: 1 TABLET ORAL at 17:05

## 2021-04-16 RX ADMIN — TRAZODONE HYDROCHLORIDE 50 MG: 100 TABLET ORAL at 20:08

## 2021-04-16 RX ADMIN — DIVALPROEX SODIUM 125 MG: 125 CAPSULE ORAL at 04:34

## 2021-04-16 RX ADMIN — GABAPENTIN 300 MG: 300 CAPSULE ORAL at 11:55

## 2021-04-16 RX ADMIN — MORPHINE SULFATE 15 MG: 15 TABLET, FILM COATED, EXTENDED RELEASE ORAL at 04:33

## 2021-04-16 RX ADMIN — DOCUSATE SODIUM 50 MG AND SENNOSIDES 8.6 MG 2 TABLET: 8.6; 5 TABLET, FILM COATED ORAL at 04:33

## 2021-04-16 RX ADMIN — OXYCODONE 5 MG: 5 TABLET ORAL at 14:11

## 2021-04-16 RX ADMIN — RISPERIDONE 0.5 MG: 0.5 TABLET ORAL at 04:33

## 2021-04-16 RX ADMIN — DIVALPROEX SODIUM 125 MG: 125 CAPSULE ORAL at 20:08

## 2021-04-16 RX ADMIN — METHOCARBAMOL TABLETS 500 MG: 500 TABLET, COATED ORAL at 17:04

## 2021-04-16 RX ADMIN — OXYCODONE 5 MG: 5 TABLET ORAL at 08:16

## 2021-04-16 RX ADMIN — DOCUSATE SODIUM 50 MG AND SENNOSIDES 8.6 MG 2 TABLET: 8.6; 5 TABLET, FILM COATED ORAL at 17:05

## 2021-04-16 RX ADMIN — MORPHINE SULFATE 15 MG: 15 TABLET, FILM COATED, EXTENDED RELEASE ORAL at 17:04

## 2021-04-16 RX ADMIN — GABAPENTIN 300 MG: 300 CAPSULE ORAL at 04:34

## 2021-04-16 RX ADMIN — AMLODIPINE BESYLATE 5 MG: 5 TABLET ORAL at 04:34

## 2021-04-16 RX ADMIN — HYDROXYZINE HYDROCHLORIDE 25 MG: 50 TABLET, FILM COATED ORAL at 23:30

## 2021-04-16 RX ADMIN — METHOCARBAMOL TABLETS 500 MG: 500 TABLET, COATED ORAL at 20:08

## 2021-04-16 RX ADMIN — GABAPENTIN 300 MG: 300 CAPSULE ORAL at 17:05

## 2021-04-16 RX ADMIN — METHOCARBAMOL TABLETS 500 MG: 500 TABLET, COATED ORAL at 11:55

## 2021-04-16 RX ADMIN — DIVALPROEX SODIUM 125 MG: 125 CAPSULE ORAL at 14:11

## 2021-04-16 RX ADMIN — OXYCODONE 5 MG: 5 TABLET ORAL at 20:08

## 2021-04-16 RX ADMIN — METHOCARBAMOL TABLETS 500 MG: 500 TABLET, COATED ORAL at 04:33

## 2021-04-16 ASSESSMENT — ENCOUNTER SYMPTOMS
DIARRHEA: 0
ABDOMINAL PAIN: 0
PALPITATIONS: 0
CONSTIPATION: 0
DEPRESSION: 0
DIZZINESS: 0
WEAKNESS: 0
FEVER: 0
SHORTNESS OF BREATH: 0
NERVOUS/ANXIOUS: 1
SENSORY CHANGE: 0
VOMITING: 0
MEMORY LOSS: 1
COUGH: 0
FOCAL WEAKNESS: 0
HEADACHES: 0
SPEECH CHANGE: 0
INSOMNIA: 1
NAUSEA: 0

## 2021-04-16 ASSESSMENT — PAIN DESCRIPTION - PAIN TYPE
TYPE: ACUTE PAIN;SURGICAL PAIN
TYPE: CHRONIC PAIN
TYPE: ACUTE PAIN
TYPE: CHRONIC PAIN

## 2021-04-16 NOTE — PROGRESS NOTES
2 RN skin check complete with Ashish TOBAR   Devices in place Wound Vac.  Skin assessed under devices yes.  Confirmed pressure ulcers found on None.  New potential pressure ulcers noted on R ear.   Wound consult placed Already placed  The following interventions in place: Pt is able to turn self and encouraged to do so.     Bilateral  Elbows red and blanching   top of R foot redness.   L leg has dressing in place CDI, wound vac connected   R ear has scab noted, L ear red but blanchable

## 2021-04-16 NOTE — CARE PLAN
Problem: Safety  Goal: Will remain free from falls  Description: Pt mobilizes frequently independently.   Outcome: PROGRESSING AS EXPECTED     Problem: Knowledge Deficit  Goal: Knowledge of disease process/condition, treatment plan, diagnostic tests, and medications will improve  Outcome: PROGRESSING SLOWER THAN EXPECTED

## 2021-04-16 NOTE — PROGRESS NOTES
Received sleeping with sitter in room, up sba to BR, wound vac CDI, dressing change planned today, prn meds for pain given, needs attended.

## 2021-04-16 NOTE — DISCHARGE PLANNING
Anticipated Discharge Disposition:   Group home with home health    Action:   RN CM called pt's Legal Guardian, Aletha. No answer, left voicemail for call back. Per chart review, Aletha was in communication with [facility] . RN CM called , no answer, left voicemail for call back.    Addendum:   RN CM received call back from Aletha. Per Aletha, [facility] ([facility], owner [place] [phone]) will do an assessment of the pt, possibly over the weekend. Contact information for the nurses station given to Coretta. Bedside RN informed via Voalte.    Received another call from Legal Guardian, saying that  is requesting for an updated COVID test. MD and bedside RN informed via Voalte.    Barriers to Discharge:   [facility] acceptance, per Legal Guardian   PCP  HH acceptance  COVID test    Plan:   Follow up with Legal Guardian.  Hospital Care Management will continue to follow and assist with discharge planning needs.

## 2021-04-16 NOTE — CARE PLAN
Problem: Pain Management  Goal: Pain level will decrease to patient's comfort goal  Outcome: PROGRESSING AS EXPECTED  Note: Prn meds per MAR     Problem: Discharge Barriers/Planning  Goal: Patient's continuum of care needs will be met  Outcome: PROGRESSING SLOWER THAN EXPECTED  Note: Placement plans

## 2021-04-16 NOTE — WOUND TEAM
[facility]  Inpatient Services  Wound and Skin Care Progress Note    Admission Date: 8/7/2020     Last order of IP CONSULT TO WOUND CARE was found on 9/1/2020 from Hospital Encounter on 8/7/2020     HPI, PMH, SH: Reviewed    Unit where seen by Wound Team: [id number]/00    WOUND CONSULT/FOLLOW UP RELATED TO:  Left leg scheduled negative pressure wound therapy (npwt) dressing change and 2 layer compression wrap change.    WOUND TYPE, LOCATION, CHARACTERISTICS (Pressure Injuries: location, stage, POA or date identified)      Foul odor noted from vac canister, exudate in cannister grey/brown    Negative Pressure Wound Therapy 11/20/20 Leg Lateral;Posterior;Medial Left (Active)   Vacuum Serial Number [id number] 04/05/21 1000   NPWT Pump Mode / Pressure Setting Ulta;125 mmHg 04/16/21 0914   Dressing Type Medium;Black Foam (Regular) 04/16/21 0914   Number of Foam Pieces Used 6 04/16/21 0914   Canister Changed No 04/16/21 0914   Output (mL) 30 mL 04/15/21 1700   NEXT Dressing Change/Treatment Date 04/26/21 04/16/21 0914   WOUND NURSE ONLY - Time Spent with Patient (mins) 90 04/12/21 0945           Wound 11/19/20 Full Thickness Wound Leg Lateral;Posterior;Medial Left (Active)   Wound Image    04/16/21 0914   Site Assessment Red;Granulation tissue 04/16/21 0914   Periwound Assessment Scar tissue 04/16/21 0914   Margins Attached edges 04/16/21 0914   Closure Secondary intention 04/16/21 0914   Drainage Amount Small 04/16/21 0914   Drainage Description Serosanguineous 04/16/21 0914   Treatments Cleansed;Site care 04/16/21 0914   Wound Cleansing Approved Wound Cleanser 04/16/21 0914   Periwound Protectant Skin Protectant Wipes to Periwound;Paste Ring;Drape 04/16/21 0914   Dressing Cleansing/Solutions Not Applicable 04/16/21 0914   Dressing Options Wound Vac;Compression Wrap Two Layer 04/16/21 0914   Dressing Changed Changed 04/16/21 0914   Dressing Status Clean;Dry;Intact 04/16/21 0914   Dressing Change/Treatment  Frequency Monday, Wednesday, Friday, and As Needed 04/16/21 0914   NEXT Dressing Change/Treatment Date 04/19/21 04/16/21 0914   NEXT Weekly Photo (Inpatient Only) 04/23/21 04/16/21 0914   Non-staged Wound Description Full thickness 04/16/21 0914   Wound Length (cm) 12.6 cm 02/22/21 0830   Wound Width (cm) 3 cm 02/22/21 0830   Wound Depth (cm) 0.1 cm 04/16/21 0914   Wound Surface Area (cm^2) 37.8 cm^2 02/22/21 0830   Wound Volume (cm^3) 11.34 cm^3 02/22/21 0830   Post-Procedure Length (cm) 15 cm 02/03/21 1100   Post-Procedure Width (cm) 3.4 cm 02/03/21 1100   Post-Procedure Depth (cm) 0.2 cm 02/03/21 1100   Post-Procedure Surface Area (cm^2) 51 cm^2 02/03/21 1100   Post-Procedure Volume (cm^3) 10.2 cm^3 02/03/21 1100   Wound Healing % -11 02/22/21 0830   Wound Bed Granulation (%) 100 % 03/30/21 0900   Wound Bed Slough (%) 10 % 03/17/21 1500   Wound Bed Granulation (%) - Post-Procedure 100 % 03/24/21 1500   Tunneling (cm) 0 cm 04/09/21 1200   Undermining (cm) 0 cm 04/09/21 1200   Shape irregular x2 04/16/21 0914   Wound Odor Foul;Mild 04/16/21 0914   Pulses Left;2+;DP;PT 03/15/21 1600   Exposed Structures None 04/16/21 0914   WOUND NURSE ONLY - Time Spent with Patient (mins) 60 04/16/21 0914      Lab Values:    Lab Results   Component Value Date/Time    WBC 7.1 04/13/2021 03:45 AM    RBC 4.20 (L) 04/13/2021 03:45 AM    HEMOGLOBIN 11.2 (L) 04/13/2021 03:45 AM    HEMATOCRIT 34.8 (L) 04/13/2021 03:45 AM    CREACTPROT 1.07 (H) 08/12/2020 01:55 PM    SEDRATEWES 85 (H) 08/07/2020 01:20 PM    HBA1C 5.7 (H) 11/09/2018 06:30 PM      Culture Results show:  Recent Results (from the past 720 hour(s))   CULTURE WOUND W/ GRAM STAIN    Collection Time: 09/01/20  2:00 PM    Specimen: Left Leg; Wound   Result Value Ref Range    Significant Indicator POS (POS)     Source WND     Site LEFT LEG     Culture Result - (A)     Gram Stain Result       Moderate Gram positive cocci.  Moderate Gram positive rods.      Culture Result (A)       Beta Hemolytic Streptococcus group A  Moderate growth      Culture Result (A)      Methicillin Resistant Staphylococcus aureus  Moderate growth      Culture Result (A)      Diphtheroids  Moderate growth  Mixed morphologies.     KOSTAS: 9/20/21   Left.    Doppler waveforms of the common femoral artery are of high amplitude and    triphasic.    Doppler waveforms at the ankle are brisk and triphasic.    Ankle-brachial index is normal.    Unable to obtained popliteal waveforms due to wound bandages.     Self Report / Pain Level: Pre-medicated     INTERVENTIONS BY WOUND TEAM: INTERVENTIONS BY WOUND TEAM:  Performed standard wound care which includes appropriate positioning, dressing removal and non-selective debridement. Pictures and measurements obtained weekly if/when required.    Cleansed: Wound cleanser and gauze used to clean wound beds  Debridement: NA  Periwound: Cleansed with cleanser and gauze. Prepped with benzoin and paste rings. Drape bridge applied to bring track pad to lateral portion of leg.  Primary -  Half cut foam cut to shape of wound beds applied then secured with drape.  Strip of foam used to bridge woundbed foam to trackpad sites.  Button used under track pads for 6 foam pieces total.    Secondary -  2 layer compression wrap applied.    Interdisciplinary consultation: Patient, Bedside RN, Miranda Jerome PT    EVALUATION / RATIONALE FOR TREATMENT:  4/16/21:  Medial wound still decreasing in size, less progress on the lateral wound. Foul odor continues to be present, now concentrated in vac cannister, no other overt signs of infection.  Consider continuing with agata with next vac change to encourage tissue granulation.   04/14/21:  No biologic to be applied this week due to approval issues.  No change in wound.   4/12/21: Medial wound appears to have decreased in size. Biologic being applied by LPS APRN weekly. Per LPS will reapply Biologic on Friday 4/16/21.   4/9: debridement and biologic placement  performed by LPS APN. Adaptic to remain in place during next change.  4/7: Biologic to be reapplied this week by LPS with possible debridement if deemed appropriate.  Wounds remain stable and appear to be to surface level. Y connect on tracpad still in use.   4/4/21: Received call pt had pulled trac pad off. Whole dressing changed. Y connected wounds with 2 separate trac pads to each wound to decrease risk of loss of NPWT. Bridged lateral trac pad up along thigh to keep out of compressed area to ease replacement if pt pulls off and making it more difficult for pt to dislodge.   4/2/2021 - Debridement performed by APRN as patient's wounds continue to fail to contract. Biologic in place to bilateral wound beds and will be applied by RAFITA Goldberg again next Friday 4/11/2021. During next vac change, adaptic to remain in place as it is protecting the biologic underneath.   3/30/21: Wound odor mild-moderate and wound bed bright red, now 100% granulation tissue.  Meredith-skin continues to be fragile/flakey; protected with hydrocolloid dressing to wound border.    3/26/21: Foul odor present at dressing removal, but significantly decreased after cleansing.  Continue current POC.  3/24/21 Wound bed red, with less drainage noted.  Strong, foul odor still present.  APRN to see pt regarding wound bed. Continue with current plan of care.   3/22/21: Area unchanged from previous dressing change.  Wound malodorous.  Meredith-skin dry and flaky at superior wound border, hydocolloid thin dressing placed.   3/13/21 Wounds failing to contract.  Will trial intermittent wound vac setting to encourage greater stimulation of cellular activity in wound bed.    3/10/2021: debrided wound, odor present, continue NPWT  3/6/12 area unchanged, odor persists  3/1/21: Lidocaine unavailable during this dressing change. Small amount of debridement completed with curette. Continued with silver powder for its antimicrobial properties.   2/27/21 skin bridge  enlarging  2/24/2021: Excisional debridement by LPS APRN performed for rolled edges that were starting to form along the posterior and medial left leg wound. Moderate bleeding managed by administration of lidocaine injection. CSWD performed for slough. Wound bed is beefy red following debridement. Resume agata and silver foam to manage bioburden. Continue with CSWD with each dressing change.   2/22/2021: Rolled edges starting to form along posterior thigh wound bed - may require excisional debridement by provider. Minimal changes to actual wound bed. Continue CSWD, agata, and silver foam to manage bioburden.   2/19/21: minimal to no changes from last assessment. Continue CSWD, agata, and silver foam for bioburden.       Goals: Steady decrease in wound area and depth weekly.    NURSING PLAN OF CARE ORDERS (X):    Dressing changes: See Dressing Care orders: X  Skin care: See Skin Care orders:   Rectal tube care: See Rectal Tube Care orders:   Other orders:      WOUND TEAM PLAN OF CARE:   Dressing changes by wound team:        Follow up 3 times weekly:                NPWT change 3 times weekly:  X,  NPWT changes 3x/ weekly with 2 layer compression wrap.  Follow up 1-2 times weekly:      Follow up Bi-Monthly:                   Follow up as needed:       Other (explain):     Anticipated discharge plans:  LTACH:        SNF/Rehab:       Home Health Care:           Outpatient Wound Center:            Self Care:           Other- [facility], unsure if they can manage wound vac. May need to DC with compression wrap only. TBD.

## 2021-04-17 LAB
SARS-COV-2 RNA RESP QL NAA+PROBE: NOTDETECTED
SPECIMEN SOURCE: NORMAL

## 2021-04-17 PROCEDURE — 700102 HCHG RX REV CODE 250 W/ 637 OVERRIDE(OP): Performed by: NURSE PRACTITIONER

## 2021-04-17 PROCEDURE — A9270 NON-COVERED ITEM OR SERVICE: HCPCS | Performed by: NURSE PRACTITIONER

## 2021-04-17 PROCEDURE — U0003 INFECTIOUS AGENT DETECTION BY NUCLEIC ACID (DNA OR RNA); SEVERE ACUTE RESPIRATORY SYNDROME CORONAVIRUS 2 (SARS-COV-2) (CORONAVIRUS DISEASE [COVID-19]), AMPLIFIED PROBE TECHNIQUE, MAKING USE OF HIGH THROUGHPUT TECHNOLOGIES AS DESCRIBED BY CMS-2020-01-R: HCPCS

## 2021-04-17 PROCEDURE — 770001 HCHG ROOM/CARE - MED/SURG/GYN PRIV*

## 2021-04-17 PROCEDURE — 700101 HCHG RX REV CODE 250: Performed by: NURSE PRACTITIONER

## 2021-04-17 PROCEDURE — U0005 INFEC AGEN DETEC AMPLI PROBE: HCPCS

## 2021-04-17 RX ADMIN — RISPERIDONE 1 MG: 1 TABLET ORAL at 16:50

## 2021-04-17 RX ADMIN — METHOCARBAMOL TABLETS 500 MG: 500 TABLET, COATED ORAL at 04:41

## 2021-04-17 RX ADMIN — DIVALPROEX SODIUM 125 MG: 125 CAPSULE ORAL at 12:42

## 2021-04-17 RX ADMIN — METHOCARBAMOL TABLETS 500 MG: 500 TABLET, COATED ORAL at 16:51

## 2021-04-17 RX ADMIN — MORPHINE SULFATE 15 MG: 15 TABLET, FILM COATED, EXTENDED RELEASE ORAL at 16:51

## 2021-04-17 RX ADMIN — DOCUSATE SODIUM 50 MG AND SENNOSIDES 8.6 MG 2 TABLET: 8.6; 5 TABLET, FILM COATED ORAL at 04:40

## 2021-04-17 RX ADMIN — GABAPENTIN 300 MG: 300 CAPSULE ORAL at 04:40

## 2021-04-17 RX ADMIN — AMLODIPINE BESYLATE 5 MG: 5 TABLET ORAL at 04:40

## 2021-04-17 RX ADMIN — RISPERIDONE 0.5 MG: 0.5 TABLET ORAL at 04:40

## 2021-04-17 RX ADMIN — DOCUSATE SODIUM 50 MG AND SENNOSIDES 8.6 MG 2 TABLET: 8.6; 5 TABLET, FILM COATED ORAL at 16:51

## 2021-04-17 RX ADMIN — TRAZODONE HYDROCHLORIDE 50 MG: 100 TABLET ORAL at 21:08

## 2021-04-17 RX ADMIN — METHOCARBAMOL TABLETS 500 MG: 500 TABLET, COATED ORAL at 21:08

## 2021-04-17 RX ADMIN — LIDOCAINE 1 PATCH: 50 PATCH CUTANEOUS at 12:42

## 2021-04-17 RX ADMIN — GABAPENTIN 300 MG: 300 CAPSULE ORAL at 12:42

## 2021-04-17 RX ADMIN — GABAPENTIN 300 MG: 300 CAPSULE ORAL at 16:51

## 2021-04-17 RX ADMIN — DIVALPROEX SODIUM 125 MG: 125 CAPSULE ORAL at 04:40

## 2021-04-17 RX ADMIN — METHOCARBAMOL TABLETS 500 MG: 500 TABLET, COATED ORAL at 12:42

## 2021-04-17 RX ADMIN — DIVALPROEX SODIUM 125 MG: 125 CAPSULE ORAL at 21:08

## 2021-04-17 RX ADMIN — MORPHINE SULFATE 15 MG: 15 TABLET, FILM COATED, EXTENDED RELEASE ORAL at 04:41

## 2021-04-17 NOTE — CARE PLAN
Problem: Knowledge Deficit  Goal: Knowledge of disease process/condition, treatment plan, diagnostic tests, and medications will improve     Intervention: Explain information regarding disease process/condition, treatment plan, diagnostic tests, and medications and document in education  poc discussed- pt verbalized understanding, needs reinforcement.         Problem: Pain Management  Goal: Pain level will decrease to patient's comfort goal     Intervention: Follow pain managment plan developed in collaboration with patient and Interdisciplinary Team  Scheduled meds given with +results.

## 2021-04-17 NOTE — PROGRESS NOTES
Assumed care at 1900, bedside report received from Jeremy TOBAR. Pt. Is medical and not on the monitor. Initial assessment completed, orders reviewed, call light within reach, bed alarm  in use, and hourly rounding in place. POC addressed with patient, no additional questions at this time.

## 2021-04-17 NOTE — PROGRESS NOTES
Hospital Medicine Twice Weekly Progress Note    Date of Service  4/16/2021    Chief Complaint  LLE wound infection    Hospital Course  Mr. Varela is a 65-year-old male with a PMHx of alcohol dependence, tobacco dependence, psychiatric disorder, and HTN who presented to the emergency department on 8/7/2020 with a left lower extremity wound infection. He was hospitalized at our facility last April and was evaluated by orthopedic surgery who recommended ongoing wound care. Wound cultures at that time grew MSSA and Streptococcus. He had been seen at  earlier that week and prescribed antibiotics, but never filled the prescription.  An ultrasound of that extremity was done and was negative for DVT.  He was treated for sepsis and completed an antibiotic course on 9/16/2020. He was also found to have head lice and underwent treatment for that.  A repeat wound culture was done on 9/28/2020 and grew Strep A and Proteus. Infectious disease was consulted and recommended a 5-day course of IV zosyn which was completed on 10/5/2020. On 10/12/2020 the wound care team noticed increased inflammation to the proximal part of the wound bed and switched from a vera flow wound VAC to a regular wound VAC.  ID was again consulted and on 10/14/2020 recommended a 7-day course of antibiotics with meropenem which was completed on 10/22/2020. Additionally, he was noted to have intermittent agitation so was started on risperdal, depakote, and gabapentin per psychiatry recommendations.  On 11/20/2020 he underwent left lower extremity debridement with wound VAC placement. Since then he has remained stable but has been deemed incapacitated to make medical decisions so guardianship was pursued and granted on 1/29/21. Placement will likely be to a group home with home health services.     Interval Problem Update  Patient ambulatory in room, alert and cooperative.  He agrees his pain is well managed on current regimen.  He denies  any other problems.  He has tangential speech.  -Continue current plan of care  -MRI negative for acute abnormalities    Consultants/Specialty  Orthopedic surgery  Infectious disease  Psychiatry    Code Status  Full Code    Disposition  Guardianship established. Possible group home with home health vs SNF.     Review of Systems  Review of Systems   Constitutional: Negative for fever and malaise/fatigue.   Respiratory: Negative for cough and shortness of breath.    Cardiovascular: Positive for leg swelling (LLE). Negative for chest pain and palpitations.   Gastrointestinal: Negative for abdominal pain, constipation (LBM this a.m. ), diarrhea, nausea and vomiting.   Genitourinary: Negative for dysuria, frequency and urgency.   Musculoskeletal:        Denies pain   Neurological: Negative for dizziness, sensory change, speech change, focal weakness, weakness and headaches.   Psychiatric/Behavioral: Positive for memory loss. Negative for depression. The patient is nervous/anxious and has insomnia.    All other systems reviewed and are negative.     Physical Exam  Temp:  [36.2 °C (97.1 °F)-37 °C (98.6 °F)] 36.2 °C (97.2 °F)  Pulse:  [62-81] 62  Resp:  [18] 18  BP: (106-118)/(66-79) 107/66  SpO2:  [92 %-95 %] 94 %    Physical Exam  Vitals and nursing note reviewed.   Constitutional:       General: He is awake.      Appearance: He is well-developed. He is ill-appearing (chronically ill appearing).   HENT:      Head: Normocephalic and atraumatic.      Mouth/Throat:      Lips: Pink.      Mouth: Mucous membranes are moist.   Eyes:      Conjunctiva/sclera: Conjunctivae normal.      Pupils: Pupils are equal, round, and reactive to light.      Comments: PERRL   Cardiovascular:      Rate and Rhythm: Normal rate and regular rhythm.      Pulses: Normal pulses.      Heart sounds: Normal heart sounds.   Pulmonary:      Effort: Pulmonary effort is normal.      Breath sounds: Normal breath sounds.   Abdominal:      General: Bowel sounds  are normal. There is no distension or abdominal bruit.      Palpations: Abdomen is soft.      Tenderness: There is no abdominal tenderness.   Musculoskeletal:      Cervical back: Normal range of motion and neck supple.      Right lower leg: No edema.      Left lower leg: No edema.      Comments: L knee contracture   Skin:     General: Skin is warm and dry.          Neurological:      General: No focal deficit present.      Mental Status: He is alert. He is disoriented and confused.      Cranial Nerves: No cranial nerve deficit or dysarthria.      Motor: No weakness.      Gait: Gait normal.   Psychiatric:         Attention and Perception: Attention and perception normal.         Mood and Affect: Mood is anxious. Affect is not angry or inappropriate.         Speech: Speech is rapid and pressured and tangential.         Behavior: Behavior is uncooperative and hyperactive.         Cognition and Memory: Cognition is impaired. Memory is impaired. He exhibits impaired recent memory and impaired remote memory.         Judgment: Judgment is impulsive and inappropriate.     Fluids  No intake or output data in the 24 hours ending 04/16/21 1908  Laboratory    Imaging  IR-US GUIDED PIV   Final Result    Ultrasound-guided PERIPHERAL IV INSERTION performed by    qualified nursing staff as above.      MR-BRAIN-W/O   Final Result      MRI of the brain without contrast within normal limits for age with moderate atrophy and mild white matter changes.      [id number] VIEW   Final Result      No contraindication to MRI      DX-CHEST-LIMITED (1 VIEW)   Final Result      1.  Mild atelectasis      2.  No contraindication to MRI      DX-SKULL-LIMITED 3-   Final Result      Negative for metallic foreign body or contraindication to MRI      CT-HEAD W/O   Final Result      No acute intracranial hemorrhage is identified.      Mild white matter hypodensity is present.  This is a nonspecific finding which usually is found to represent chronic  microvascular disease in patient's of this demographic.  Demyelination, age indeterminant ischemia and gliosis are also common    possibilities.      Mild parenchymal atrophy      DX-CHEST-LIMITED (1 VIEW)   Final Result      No evidence of acute cardiopulmonary process.      IR-US GUIDED PIV   Final Result    Ultrasound-guided PERIPHERAL IV INSERTION performed by    qualified nursing staff as above.      IR-MIDLINE CATHETER INSERTION WO GUIDANCE > AGE 3   Final Result                  Ultrasound-guided midline placement performed by qualified nursing staff    as above.          IR-US GUIDED PIV   Final Result    Ultrasound-guided PERIPHERAL IV INSERTION performed by    qualified nursing staff as above.      IR-MIDLINE CATHETER INSERTION WO GUIDANCE > AGE 3   Final Result                  Ultrasound-guided midline placement performed by qualified nursing staff    as above.          US-KOSTAS SINGLE LEVEL BILAT   Final Result      CT-HEAD W/O   Final Result      No acute intracranial abnormality      DX-TIBIA AND FIBULA LEFT   Final Result      1.  Healed distal metaphyseal fractures of the left fibula and tibia with tibial IM layla in place.      2.  No acute fracture or dislocation.      3.  No radiopaque soft tissue foreign body.      DX-FEMUR-2+ LEFT   Final Result      1.  No radiographic evidence of acute traumatic injury left femur.      2.  Unchanged cortical thickening in the posterior aspect of the distal femoral diaphysis which may represent sequela of prior injury or infection.      3.  No soft tissue foreign body identified.      US-EXTREMITY VENOUS LOWER UNILAT LEFT   Final Result      DX-CHEST-PORTABLE (1 VIEW)   Final Result      No acute cardiopulmonary abnormality.         Assessment/Plan  * Wound of left leg- (present on admission)  Assessment & Plan  -Wound vac to LLE. Wound care following, appreciate assistance.   -Pain controlled, continue med regimen as currently ordered.   -Monitor for evidence of  infection.     Encephalopathy, unspecified- (present on admission)  Assessment & Plan  -intermittently symptomatic.  Somewhat confused,  agitated.  Poor memory.    -Labs within normal limits. Clinical picture does not indicate infection.  -Stat CT head obtained and was negative.  -MRI within normal limits  -Depakote level low.  -No recent changes to medications.    Psychiatric disorder- (present on admission)  Assessment & Plan  -Unspecified.  -Continue Risperdal and Depakote.   -Psychiatry was consulted and deemed him incapacitated to leave AMA or make medical decisions.  -SLP repeated cognitive evaluation in Jan 2021- continues to recommend 24/7 supervision.     Essential hypertension- (present on admission)  Assessment & Plan  -Well controlled on current regimen.   -Continue amlodipine.     Emphysema/COPD (HCC)- (present on admission)  Assessment & Plan  -Chronic and stable off medication.  -No acute exacerbation.     Protein malnutrition (HCC)- (present on admission)  Assessment & Plan  -Body mass index is 21.53 kg/m².-Slightly improving.   -Continue TID supplements.   -Encourage PO intake.    Flexion contracture of knee, 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)  Assessment & Plan  -Secondary to chronic wound in that area.  -Continue PT/OT.  -Does not seem to limit his mobility. Is frequently ambulatory.  -Continue PRN flexeril. Robaxin added 3/14/2021.     VTE prophylaxis: Ambulatory    FELIPE Hull

## 2021-04-17 NOTE — CARE PLAN
Problem: Safety  Goal: Will remain free from injury  Outcome: PROGRESSING AS EXPECTED  Goal: Will remain free from falls  Description: Pt mobilizes frequently independently.   Outcome: PROGRESSING AS EXPECTED     Problem: Bowel/Gastric:  Goal: Normal bowel function is maintained or improved  Outcome: PROGRESSING AS EXPECTED  Goal: Will not experience complications related to bowel motility  Outcome: PROGRESSING AS EXPECTED     Problem: Knowledge Deficit  Goal: Knowledge of disease process/condition, treatment plan, diagnostic tests, and medications will improve  Outcome: PROGRESSING AS EXPECTED  Goal: Knowledge of the prescribed therapeutic regimen will improve  Outcome: PROGRESSING AS EXPECTED     Problem: Pain Management  Goal: Pain level will decrease to patient's comfort goal  Outcome: PROGRESSING AS EXPECTED     Problem: Psychosocial Needs:  Goal: Level of anxiety will decrease  Outcome: PROGRESSING AS EXPECTED     Problem: Mobility  Goal: Risk for activity intolerance will decrease  Outcome: PROGRESSING AS EXPECTED

## 2021-04-18 PROCEDURE — A9270 NON-COVERED ITEM OR SERVICE: HCPCS | Performed by: NURSE PRACTITIONER

## 2021-04-18 PROCEDURE — 770001 HCHG ROOM/CARE - MED/SURG/GYN PRIV*

## 2021-04-18 PROCEDURE — 700102 HCHG RX REV CODE 250 W/ 637 OVERRIDE(OP): Performed by: NURSE PRACTITIONER

## 2021-04-18 PROCEDURE — 700101 HCHG RX REV CODE 250: Performed by: NURSE PRACTITIONER

## 2021-04-18 RX ADMIN — GABAPENTIN 300 MG: 300 CAPSULE ORAL at 16:54

## 2021-04-18 RX ADMIN — METHOCARBAMOL TABLETS 500 MG: 500 TABLET, COATED ORAL at 16:54

## 2021-04-18 RX ADMIN — LIDOCAINE 1 PATCH: 50 PATCH CUTANEOUS at 11:38

## 2021-04-18 RX ADMIN — GABAPENTIN 300 MG: 300 CAPSULE ORAL at 11:33

## 2021-04-18 RX ADMIN — METHOCARBAMOL TABLETS 500 MG: 500 TABLET, COATED ORAL at 05:36

## 2021-04-18 RX ADMIN — METHOCARBAMOL TABLETS 500 MG: 500 TABLET, COATED ORAL at 11:33

## 2021-04-18 RX ADMIN — DIVALPROEX SODIUM 125 MG: 125 CAPSULE ORAL at 14:18

## 2021-04-18 RX ADMIN — OXYCODONE 5 MG: 5 TABLET ORAL at 18:22

## 2021-04-18 RX ADMIN — TRAZODONE HYDROCHLORIDE 50 MG: 100 TABLET ORAL at 21:07

## 2021-04-18 RX ADMIN — RISPERIDONE 0.5 MG: 0.5 TABLET ORAL at 05:35

## 2021-04-18 RX ADMIN — DIVALPROEX SODIUM 125 MG: 125 CAPSULE ORAL at 21:07

## 2021-04-18 RX ADMIN — DIVALPROEX SODIUM 125 MG: 125 CAPSULE ORAL at 05:35

## 2021-04-18 RX ADMIN — RISPERIDONE 1 MG: 1 TABLET ORAL at 16:53

## 2021-04-18 RX ADMIN — MORPHINE SULFATE 15 MG: 15 TABLET, FILM COATED, EXTENDED RELEASE ORAL at 05:36

## 2021-04-18 RX ADMIN — OXYCODONE 5 MG: 5 TABLET ORAL at 11:38

## 2021-04-18 RX ADMIN — AMLODIPINE BESYLATE 5 MG: 5 TABLET ORAL at 05:37

## 2021-04-18 RX ADMIN — METHOCARBAMOL TABLETS 500 MG: 500 TABLET, COATED ORAL at 21:07

## 2021-04-18 RX ADMIN — MORPHINE SULFATE 15 MG: 15 TABLET, FILM COATED, EXTENDED RELEASE ORAL at 16:54

## 2021-04-18 RX ADMIN — GABAPENTIN 300 MG: 300 CAPSULE ORAL at 05:36

## 2021-04-18 ASSESSMENT — PAIN DESCRIPTION - PAIN TYPE
TYPE: ACUTE PAIN

## 2021-04-18 NOTE — PROGRESS NOTES
Patient alert and oriented to self only, vss, denies pain. Patient with 1:1 sitter for safety as patient unable to leave AMA and is impulsive at this time. Patient continues with wound vac and will monitor.

## 2021-04-18 NOTE — CARE PLAN
Problem: Safety  Goal: Will remain free from injury  Outcome: PROGRESSING AS EXPECTED  Goal: Will remain free from falls  Description: Pt mobilizes frequently independently.   Outcome: PROGRESSING AS EXPECTED  Note: Patient with 1:1 sitter in place for safety as patient remains impulsive at times and is unable to leave AMA. Remains elopement risk as well. Patient is however steady on feet and is wearing non skid socks for safety.      Problem: Knowledge Deficit  Goal: Knowledge of disease process/condition, treatment plan, diagnostic tests, and medications will improve  Outcome: PROGRESSING AS EXPECTED  Goal: Knowledge of the prescribed therapeutic regimen will improve  Outcome: PROGRESSING AS EXPECTED  Note: Patient reoriented as needed. Patient educated on wound vac and need to assess however patient refused assessment. Patient will need post acute placement at this time.

## 2021-04-18 NOTE — CARE PLAN
Problem: Safety  Goal: Will remain free from injury  Outcome: PROGRESSING AS EXPECTED  Periodically reorient pt on use of safety devices; 1:1 sitter in place     Problem: Pain Management  Goal: Pain level will decrease to patient's comfort goal  Outcome: PROGRESSING AS EXPECTED   Implement pain rating scale and treat appropriately

## 2021-04-19 PROCEDURE — 700102 HCHG RX REV CODE 250 W/ 637 OVERRIDE(OP): Performed by: NURSE PRACTITIONER

## 2021-04-19 PROCEDURE — 11045 DBRDMT SUBQ TISS EACH ADDL: CPT | Performed by: NURSE PRACTITIONER

## 2021-04-19 PROCEDURE — 99231 SBSQ HOSP IP/OBS SF/LOW 25: CPT | Performed by: NURSE PRACTITIONER

## 2021-04-19 PROCEDURE — 302098 PASTE RING (FLAT): Performed by: NURSE PRACTITIONER

## 2021-04-19 PROCEDURE — A9270 NON-COVERED ITEM OR SERVICE: HCPCS | Performed by: NURSE PRACTITIONER

## 2021-04-19 PROCEDURE — 700101 HCHG RX REV CODE 250: Performed by: NURSE PRACTITIONER

## 2021-04-19 PROCEDURE — 770001 HCHG ROOM/CARE - MED/SURG/GYN PRIV*

## 2021-04-19 PROCEDURE — 97607 NEG PRS WND THR NDME<=50SQCM: CPT

## 2021-04-19 PROCEDURE — 99232 SBSQ HOSP IP/OBS MODERATE 35: CPT | Mod: 25 | Performed by: NURSE PRACTITIONER

## 2021-04-19 PROCEDURE — 11042 DBRDMT SUBQ TIS 1ST 20SQCM/<: CPT | Performed by: NURSE PRACTITIONER

## 2021-04-19 RX ADMIN — MORPHINE SULFATE 15 MG: 15 TABLET, FILM COATED, EXTENDED RELEASE ORAL at 06:01

## 2021-04-19 RX ADMIN — METHOCARBAMOL TABLETS 500 MG: 500 TABLET, COATED ORAL at 21:51

## 2021-04-19 RX ADMIN — DIVALPROEX SODIUM 125 MG: 125 CAPSULE ORAL at 21:52

## 2021-04-19 RX ADMIN — DIVALPROEX SODIUM 125 MG: 125 CAPSULE ORAL at 13:25

## 2021-04-19 RX ADMIN — METHOCARBAMOL TABLETS 500 MG: 500 TABLET, COATED ORAL at 13:25

## 2021-04-19 RX ADMIN — AMLODIPINE BESYLATE 5 MG: 5 TABLET ORAL at 06:01

## 2021-04-19 RX ADMIN — LIDOCAINE 1 PATCH: 50 PATCH CUTANEOUS at 13:25

## 2021-04-19 RX ADMIN — GABAPENTIN 300 MG: 300 CAPSULE ORAL at 17:20

## 2021-04-19 RX ADMIN — RISPERIDONE 0.5 MG: 0.5 TABLET ORAL at 06:01

## 2021-04-19 RX ADMIN — GABAPENTIN 300 MG: 300 CAPSULE ORAL at 13:25

## 2021-04-19 RX ADMIN — DIVALPROEX SODIUM 125 MG: 125 CAPSULE ORAL at 06:01

## 2021-04-19 RX ADMIN — METHOCARBAMOL TABLETS 500 MG: 500 TABLET, COATED ORAL at 17:20

## 2021-04-19 RX ADMIN — RISPERIDONE 1 MG: 1 TABLET ORAL at 17:20

## 2021-04-19 RX ADMIN — METHOCARBAMOL TABLETS 500 MG: 500 TABLET, COATED ORAL at 06:01

## 2021-04-19 RX ADMIN — MORPHINE SULFATE 15 MG: 15 TABLET, FILM COATED, EXTENDED RELEASE ORAL at 17:20

## 2021-04-19 RX ADMIN — TRAZODONE HYDROCHLORIDE 50 MG: 100 TABLET ORAL at 21:51

## 2021-04-19 RX ADMIN — GABAPENTIN 300 MG: 300 CAPSULE ORAL at 06:01

## 2021-04-19 ASSESSMENT — ENCOUNTER SYMPTOMS
HEADACHES: 0
ABDOMINAL PAIN: 0
FEVER: 0
DIARRHEA: 0
INSOMNIA: 0
SPEECH CHANGE: 0
MEMORY LOSS: 1
SHORTNESS OF BREATH: 0
SENSORY CHANGE: 0
VOMITING: 0
COUGH: 0
DIZZINESS: 0
PALPITATIONS: 0
WEAKNESS: 0
FOCAL WEAKNESS: 0
CONSTIPATION: 0
DEPRESSION: 0
NAUSEA: 0
NERVOUS/ANXIOUS: 1

## 2021-04-19 ASSESSMENT — PAIN DESCRIPTION - PAIN TYPE
TYPE: ACUTE PAIN
TYPE: ACUTE PAIN

## 2021-04-19 NOTE — CARE PLAN
1:1 Sitter in place for safety and elopement risk. Pt has been impulsive and disoriented. Awaiting placement.     Problem: Knowledge Deficit  Goal: Knowledge of disease process/condition, treatment plan, diagnostic tests, and medications will improve  Outcome: NOT MET  Goal: Knowledge of the prescribed therapeutic regimen will improve  Outcome: NOT MET     Problem: Discharge Barriers/Planning  Goal: Patient's continuum of care needs will be met  Outcome: NOT MET

## 2021-04-19 NOTE — DISCHARGE PLANNING
Anticipated Discharge Disposition:   Group home with home health    Action:   1014: RN CM called pt's Legal Guardian, Aletha Grady ([facility] Public [employer]ians office / cell ). No answer, left voicemail for call back.     Addendum:  1210: Per chart review, pt has a wound vac. RN CM sent Voalte to [facility]. Per [facility], pt will discharge with wound vac, pending DME order.    RN CM [facility] for wound vac, spoke to Denise. Per Denise, [facility] will need DME referral, wound vac order form, H&P, wound note with measurements, facesheet, OP report.     Barriers to Discharge:   Group Home Placement acceptance and bed availability.  PCP  Home Health acceptance  Wound vac set up and delivery    Plan:   Hospital Care Management will continue to follow and assist with discharge planning needs.

## 2021-04-19 NOTE — DISCHARGE PLANNING
Anticipated Discharge Disposition:   Group home with home health    Action:   EKATERINA YANES received Peterson from [address] (Phone: [phone], fax: [phone]). Per Peterson, he saw the pt today and can accept the pt. They have Dr. Mino Harding from [facility] who follows their patients. Peterson is requesting for last 3 MD progress notes, prescriptions x 1 month ( uses [facility]), JAI ($2,800 per month), Quantiferon and COVID results, to be faxed or sent with pt at discharge. RN CM called pt's Legal Guardian, no answer, left voicemail for call back.     Barriers to Discharge:   waiting for call back from pt's Legal Guardian, Aletha.   PCP   acceptance    Plan:   Follow up with pt's Legal Guardian.  Hospital Care Management will continue to follow and assist with discharge planning needs.

## 2021-04-19 NOTE — CARE PLAN
Problem: Safety  Goal: Will remain free from injury  Outcome: PROGRESSING AS EXPECTED  Goal: Will remain free from falls  Description: Pt mobilizes frequently independently.   Outcome: PROGRESSING AS EXPECTED  Note: Patient with 1:1 sitter for safety and remains safe at this time. Patient wearing non skid shoes and belongings within reach.      Problem: Venous Thromboembolism (VTW)/Deep Vein Thrombosis (DVT) Prevention:  Goal: Patient will participate in Venous Thrombosis (VTE)/Deep Vein Thrombosis (DVT)Prevention Measures  Outcome: PROGRESSING AS EXPECTED  Note: Patient refuses SCDs and these would be safety concern as patient is impulsive at times. Patient ambulating in room often.

## 2021-04-19 NOTE — PROGRESS NOTES
Patient with 1:1 sitter in place for safety and elopement risk. Patient deemed incapacitated per psych and unable to leave AMA. Patient impulsive at times. Will monitor. Awaiting placement.

## 2021-04-19 NOTE — DISCHARGE PLANNING
note:  Received referral from [facility].   Discussed with Charlie TOBAR CM who informed CM that public guardian already found a [facility] for pt and assessment is pending.

## 2021-04-20 PROCEDURE — A9270 NON-COVERED ITEM OR SERVICE: HCPCS | Performed by: NURSE PRACTITIONER

## 2021-04-20 PROCEDURE — 700102 HCHG RX REV CODE 250 W/ 637 OVERRIDE(OP): Performed by: NURSE PRACTITIONER

## 2021-04-20 PROCEDURE — 99231 SBSQ HOSP IP/OBS SF/LOW 25: CPT | Performed by: NURSE PRACTITIONER

## 2021-04-20 PROCEDURE — 770001 HCHG ROOM/CARE - MED/SURG/GYN PRIV*

## 2021-04-20 PROCEDURE — 700101 HCHG RX REV CODE 250: Performed by: NURSE PRACTITIONER

## 2021-04-20 RX ADMIN — METHOCARBAMOL TABLETS 500 MG: 500 TABLET, COATED ORAL at 12:12

## 2021-04-20 RX ADMIN — AMLODIPINE BESYLATE 5 MG: 5 TABLET ORAL at 05:32

## 2021-04-20 RX ADMIN — DIVALPROEX SODIUM 125 MG: 125 CAPSULE ORAL at 13:28

## 2021-04-20 RX ADMIN — GABAPENTIN 300 MG: 300 CAPSULE ORAL at 05:32

## 2021-04-20 RX ADMIN — METHOCARBAMOL TABLETS 500 MG: 500 TABLET, COATED ORAL at 05:32

## 2021-04-20 RX ADMIN — TRAZODONE HYDROCHLORIDE 50 MG: 100 TABLET ORAL at 19:59

## 2021-04-20 RX ADMIN — RISPERIDONE 0.5 MG: 0.5 TABLET ORAL at 05:32

## 2021-04-20 RX ADMIN — MORPHINE SULFATE 15 MG: 15 TABLET, FILM COATED, EXTENDED RELEASE ORAL at 05:32

## 2021-04-20 RX ADMIN — METHOCARBAMOL TABLETS 500 MG: 500 TABLET, COATED ORAL at 17:17

## 2021-04-20 RX ADMIN — METHOCARBAMOL TABLETS 500 MG: 500 TABLET, COATED ORAL at 20:00

## 2021-04-20 RX ADMIN — OXYCODONE 5 MG: 5 TABLET ORAL at 02:10

## 2021-04-20 RX ADMIN — OXYCODONE 5 MG: 5 TABLET ORAL at 12:18

## 2021-04-20 RX ADMIN — GABAPENTIN 300 MG: 300 CAPSULE ORAL at 17:17

## 2021-04-20 RX ADMIN — CYCLOBENZAPRINE 10 MG: 10 TABLET, FILM COATED ORAL at 20:28

## 2021-04-20 RX ADMIN — DIVALPROEX SODIUM 125 MG: 125 CAPSULE ORAL at 20:00

## 2021-04-20 RX ADMIN — RISPERIDONE 1 MG: 1 TABLET ORAL at 17:17

## 2021-04-20 RX ADMIN — GABAPENTIN 300 MG: 300 CAPSULE ORAL at 12:12

## 2021-04-20 RX ADMIN — DOCUSATE SODIUM 50 MG AND SENNOSIDES 8.6 MG 2 TABLET: 8.6; 5 TABLET, FILM COATED ORAL at 17:17

## 2021-04-20 RX ADMIN — LIDOCAINE 1 PATCH: 50 PATCH CUTANEOUS at 12:11

## 2021-04-20 RX ADMIN — DIVALPROEX SODIUM 125 MG: 125 CAPSULE ORAL at 05:32

## 2021-04-20 RX ADMIN — OXYCODONE 5 MG: 5 TABLET ORAL at 18:38

## 2021-04-20 RX ADMIN — MORPHINE SULFATE 15 MG: 15 TABLET, FILM COATED, EXTENDED RELEASE ORAL at 17:17

## 2021-04-20 ASSESSMENT — ENCOUNTER SYMPTOMS
FEVER: 0
MYALGIAS: 1
DIARRHEA: 0
EYE REDNESS: 0
CONSTIPATION: 0
EYE PAIN: 0
NAUSEA: 0
DIZZINESS: 0
WEAKNESS: 0
INSOMNIA: 1
SORE THROAT: 0
VOMITING: 0
SHORTNESS OF BREATH: 0
PALPITATIONS: 0
DEPRESSION: 0
COUGH: 0
ABDOMINAL PAIN: 0
NERVOUS/ANXIOUS: 1
MEMORY LOSS: 1
HEADACHES: 0

## 2021-04-20 ASSESSMENT — PAIN DESCRIPTION - PAIN TYPE
TYPE: ACUTE PAIN

## 2021-04-20 ASSESSMENT — PAIN SCALES - WONG BAKER: WONGBAKER_NUMERICALRESPONSE: DOESN'T HURT AT ALL

## 2021-04-20 NOTE — PROGRESS NOTES
"WOUND CARE PROVIDER PROGRESS NOTE        HPI: 65-year-old male homelessness, EtOH use, tobacco dependence, chronic left lower extremity wound admitted 8/7/2020 with left lower extremity wound infection.  Patient was recently hospitalized at [facility] in April 2020, evaluated by orthopedic surgery who recommended wound care.  Wound culture grew MSSA and strep.  Patient was recently seen at [facility] prior to this admission was given a prescription for antibiotics but did not fill this.  Patient reports that he has had this wound for 8 to 10 years.  He reports that he fell and went \"ass over tea kettle\".  He reports that he would clean the wound almost daily with soap and water at the homeless shelter.  Per chart review, patient reported he developed the ulcer in May 2018 when he fell while hiking.  Patient was seen at wound care clinic in August 2018.  Patient had a  assisting him while he was living at [facility].  Wound VAC /was ordered however  had concerns with patient's compliancy and/ access to medical care.  Skilled nursing facility was contacted to have patient be admitted, however he was not accepted due to Medicaid.    Not on any blood thinners.  Patient ambulatory in halls.  Allergies reviewed.  He denies any allergies.    Surgery date: 9/1/2020 by Belkis ELLER  Procedure: Excision debridement to left leg ulcer with injectable lidocaine and epinephrine     Surgery date: 11/19/2020 by Dr. Olvera  Procedure:1.  Irrigation and debridement of multiple compartments of the left leg with 2   separate 16 cm x 5 cm superficial ulcerations in posterior knee and calf.  2.  AmnioFix biologic sheet placement, left leg.  3.  Wound VAC placement, left leg, 16x5 + 16x5 cm.      Surgery date: 2/24/21 by Belkis ELLER  Procedure: Excision debridement to left leg ulcer with injectable lidocaine and epinephrine             Interval hx:   9/11/2020: pt calm cooperative. On " zyvox. Seen during VAC and two layer compression wrap change. Pt tolerating VAC and wraps. Wound decreased in size.   9/18/2020: Patient denies any fevers, chills, nausea, vomiting.  He is tolerating the veraflo wound VAC and 2 layer compression wrap.  Wound is decreasing in size.  Increased granular tissue noted, wound edges have improved however he does have rolled edges to popliteal fossa area.  Patient calm and cooperative, watching sports.  9/30/2020: Denies fevers, chills, nausea, vomiting.  Tolerating wound VAC and 2 layer compression wrap.  Refused nail care.  Wound culture positive for strep A and Proteus.  10/7/2020: completed 5 day course of zyvox. Denies fevers chills nausea vomiting.  Seen during veraflo VAC and 2 layer compression wrap change.    10/14/2020: Patient denies fevers, chills, nausea, vomiting.  Patient wound is malodorous complaining of increased pain to the wound.  Increased slough noted to wound bed despite veraflo wound VAC.  Reconsult ID.  10/16/2020: Patient seen during wound VAC change with Miranda wound care PT. patient denies fever, chills, nausea, vomiting.  Patient reports pain to wound and increase in pain with wound VAC change.  Patient has granular tissue throughout wound with mild amount of slough to posterior aspect of leg.  Malodorous with VAC removal.  Wound VAC nursing changed.  10/29/2020: Wound VAC was discontinued by wound team on 10/23/2020 due to slow progress and collagen was started.  Patient has completed 7-day antibiotic course of meropenem for multidrug resistant Proteus vulgaris.  Patient found to have lice and has been treated.  Nonfoul-smelling odor present with dressing removal.  11/5/2020: Seen with wound team during 2 layer compression dressing change and for excisional debridement.  No odor noted today.  Thick layer of biofilm noted.  Wound edges continue to improve but still remain to medial aspect of wound.  11/17/2020: Seen with wound team during  dressing and 2 layer compression wrap change.  Patient with severely thick scar tissue to wound edges.  Excisional debridement with injectable lidocaine and epinephrine performed today.  Patient denies any fevers, chills, nausea, vomiting.  11/23/2020: POD #4 SP left leg I&D with biologic and VAC placement.  VAC functioning.  Foul odor coming from wound.  11/30/2020: POD #11.  Patient tired of walking around with wound VAC machine.  But agreeable to continue with wound VAC.  Mild odor coming from wound with wound VAC removal.  12/7/2020: POD #18.  Seen during wound VAC change.  Sitter no longer at bedside.  12/14/2020 POD # 25.  Seen during wound VAC change with wound team.  Previous wound culture from last week showed organisms but they were not worked up.  New wound culture to be taken today.  Odor remains slightly diminished.  Drainage heavy, slightly decreased from last week.  Continue with nystatin powder and silver foam.  Patient tired today.  12/16/2020: Wound culture 12/14/2020 + for Proteus Mirabilis.  Discussed case with ID, previously following this admit.  Recommended Augmentin for 1 week.  12/17/2020: Patient not drinking boost.  Has stockpile in room.  Dietary consulted.  Started Augmentin yesterday. Contacted micro to review culture.  So far growing Proteus and diphtheroids.  Micro to assess for fungal component.  1/4/2021: Wounds have  into 2 wounds again and are decreasing in size.  No longer doing silver foam but rather Arglaes powder and nystatin powder.  Odor remains.  Completed antibiotics.  Guardianship hearing is 1/29/2021.  Had arginaid supplement 4 times per day for 2 weeks that started on 12/17/2020.  1/22/2021: Seen with wound team for VAC change.  Odor is decreased.  Skin bridges have formed and now patient has 3 smaller wounds.  However there is increased periwound breakdown.  Patient experiencing pain with VAC change to proximal thigh.  Topical lidocaine used.  Wound VAC held  for weekend due to periwound breakdown.  2/5/2021: Seen with wound team during VAC change.  Odor remains the same.  There is no longer skin bridge to the medial wound  it into 2.  Patient has drainage seeping out underneath the drape causing periwound irritation.  Excisional debridement was performed today.  Patient has guardian.  2/24/21: Seen with wound team during VAC change.  Odor remains.  Epibole to skin edges.  There is significant amount of slough and tenderness to lateral proximal thigh ulcer.  Excisional debridement performed today with injectable lidocaine and epinephrine.  Consent obtained from guardian.  3/3/21: POD # 7 s/p bedside I & D. Restarted L arginine supplements. Has not drank supplement yet today.   3/10/2021: POD #14.  Drinking L-arginine supplements.  Showered before wound VAC change.  Patient trimmed his toenails and fingernails last week.  3/24/2021: Has completed L-arginine supplements.  Wound remains unchanged.  Consider plastic surgery consult for skin graft.  4/2/2021: Patient in a pleasant mood.  Less odor to wound.  Wounds to leg have only slightly decreased in size.  Excisional debridement and application of amniofix followed by VAC applied today.  4/9/2021: Patient seen with Pamela Wound Care Rn, Precious scanlon. Wounds decreasing in size and with pink granular tissue. Wounds debrided and amniofix membrane applied to wounds prior to wound vac reapplication.  4/19/2021: Patient seen with Marci Wound Care RN. Wounds decreasing in size. Pink/granular tissue to both wounds. Debridement done. Wound vac and 2 layer compressions reapplied.       Results:  Wound culture left leg 12/14/2020: No fungal growth, Proteus mirabilis  Wound culture left leg 12/7/2020:Light growth mixed organisms.   Covid screen not detected 11/17/2020  CBC 11/22/2020: WBC 6.5, hemoglobin and hematocrit 9.9/30.5.  Platelet count 403.  CBC with differential 10/14/2020: WBC 6, H&H  11/33.9  Wound culture: 9/28/2020: Positive for beta-hemolytic strep group A, Proteus.    Wound cx: 9/1/2020 mrsa, diphtheroids, beta hemolytic strep group A    8/7/2020: X-ray tib-fib left:  1.  Healed distal metaphyseal fractures of the left fibula and tibia with tibial IM layla in place.     2.  No acute fracture or dislocation.     3.  No radiopaque soft tissue foreign body.      Arterial studies:   9/2/2020  Right.    Doppler waveforms of the common femoral artery are of high amplitude and    triphasic.    Doppler waveforms at the ankle are brisk and triphasic.    Ankle-brachial index is normal.       Left.    Doppler waveforms of the common femoral artery are of high amplitude and    triphasic.    Doppler waveforms at the ankle are brisk and triphasic.    Ankle-brachial index is normal.    Unable to obtained popliteal waveforms due to wound bandages.        Exam:    Palpable PT pulse to left foot +1 foot   +2 DP left foot  Difficulty palpating popliteal due to scar tissue      Left lower extremity:    Posterior ulcer:  Full thickness ulceration.   Wound bed with pink/granular tissue.   Edges are flat and attached.   No drainage, periwound maceration, edema, or erythema.   No odor.   Post debridment measurements 14cm x 3.8cm x 0.1.      Medial ulcer:  Full thickness ulceration.   Wound bed with pink granular tissue.   Small amount of soft yellow slough to center of wound.   Edges are flat and attached.   No edema, erythema, or drainage.   No odor.   Post-debridement: 13cm x 3.2cm xUTO    Pressure injury noted to below posterior wound when removing vac.   Skin is intact.       PROCEDURE: 2% viscous topical lidocaine applied to both leg wounds, dwelled for approximately 5 minutes.  -Curette used to debride wound beds.  Excisional debridement was performed to remove devitalized tissue until healthy, bleeding tissue was visualized.   Entire surface of wound, 94.8 cm2 debrided.  Tissue debrided into the subcutaneous  layer.     -Bleeding controlled with manual pressure.    -Wound care completed by wound RN, refer to flowsheet  -Patient tolerated the procedure well, without c/o pain or discomfort.     Amniofix lo2021: Amniofix  2 x 12 cm, expires 2023       Amniofix  2 x 12 cm, expires 2024       Amniofix  2 x 12 cm, expires 2023:        Amniofix  2 x 12 cm, expires 10/1/2023, serial # [id number]       Amniofix  2 x 12 cm, expires 2026, serial # [id number]       Amniofix  2 x 12 cm, expires 2026, serial # [id number]         ASSESSMENT/PLAN:  65-year-old male with homelessness, EtOH use, tobacco use, and chronic left leg ulcer.  SP left leg I&D with amnio fix and wound VAC placement by Dr. Olvera on 2020    -Wound remains as 2 wounds.  Skin bridge to medial wound is no longer present   Wound size slightly decreased but remains essentially unchanged.   -L arginine supplements restarted 3/2/21.  Patient has completed 2-week course of supplements.  -periwound irritation resolved with hydrocolloid.  -Odor remains, heavy drainage slightly decreased  -biofilm debrided each visit  -Edema controlled with 2 layer compression wrap.  -Patient has completed 1 week of Augmentin and L-arginine supplement for 2 weeks in 2020  -Patient to shower before each wound VAC change  -Amnio fix followed by wound VAC applied to both wounds.      PLAN  -Continue wound VAC.  Change 3 times per week.   -APRN will return weekly for assessment and debridement  -Wound care: Black foam, drape to wound at 150 mmHg continuous  -Continue 2 layer compression wrap extending to thigh  Pressure injury to LLE: Hydrocolloid covered with drape.   -shower prior to VAC changes, ok to remove 2 layer compression wrap to shower    Patient to continue to be seen by wound care team while admitted   wound care nurse practitioner to follow weekly    Discharge plan:  Working on group home placement.   Patient has guardian.      D/W: Patient, Bedside RN, Marci Wound Care RN

## 2021-04-20 NOTE — DISCHARGE PLANNING
Anticipated Discharge Disposition:   Group home with home health, wound vac    Action:   Received call from pt's Legal Guardian Aletha Grady. Per Aletha, she has the new pt paperwork for pt's PCP and pharmacy.   Per Aletha, she does not know who the patient will be assigned to yet.     Per Aletha, [facility] accepted pt.   Address is:  [address] [place] [address]   [id number]    Per Aletha, she spoke to [facility] and they agreed to accept pt without PCP appointment for now. Received  choice. Choice form faxed to [facility]. Received verbal consent from Aletha for wound vac referral to [facility]. Need HH acceptance and  address for order.     Aletha is requesting for Meds to beds     Barriers to Discharge:   [facility]  HH acceptance  Wound vac set up and delivery    Plan:   Hospital Care Management will continue to follow and assist with discharge planning needs.

## 2021-04-20 NOTE — PROGRESS NOTES
Hospital Medicine Twice Weekly Progress Note    Date of Service  4/20/2021    Chief Complaint  LLE wound infection    Hospital Course  Mr. Varela is a 65-year-old male with a PMHx of alcohol dependence, tobacco dependence, psychiatric disorder, and HTN who presented to the emergency department on 8/7/2020 with a left lower extremity wound infection. He was hospitalized at our facility last April and was evaluated by orthopedic surgery who recommended ongoing wound care. Wound cultures at that time grew MSSA and Streptococcus. He had been seen at [facility] earlier that week and prescribed antibiotics, but never filled the prescription.  An ultrasound of that extremity was done and was negative for DVT.  He was treated for sepsis and completed an antibiotic course on 9/16/2020. He was also found to have head lice and underwent treatment for that.  A repeat wound culture was done on 9/28/2020 and grew Strep A and Proteus. Infectious disease was consulted and recommended a 5-day course of IV zosyn which was completed on 10/5/2020. On 10/12/2020 the wound care team noticed increased inflammation to the proximal part of the wound bed and switched from a vera flow wound VAC to a regular wound VAC.  ID was again consulted and on 10/14/2020 recommended a 7-day course of antibiotics with meropenem which was completed on 10/22/2020. Additionally, he was noted to have intermittent agitation so was started on risperdal, depakote, and gabapentin per psychiatry recommendations.  On 11/20/2020 he underwent left lower extremity debridement with wound VAC placement. Since then he has remained stable but has been deemed incapacitated to make medical decisions so guardianship was pursued and granted on 1/29/21. Placement will likely be to a group home with home health services.     Interval Problem Update  4/20-patient up ambulating around room in no acute distress.  Wound VAC remains connected to left lower extremity with  serosanguineous/purulent drainage in canister.  Patient states some slight aching to left lower extremity although states it is nothing he can handle.  Patient in good mood conversing with provider, does not remember what hospital he is in but is happy that baseball is on TV.     Consultants/Specialty  Orthopedic surgery  Infectious disease  Psychiatry    Code Status  Full Code    Disposition  Guardianship established. Possible group home with home health vs SNF.     Review of Systems  Review of Systems   Constitutional: Negative for fever and malaise/fatigue.   HENT: Negative for congestion and sore throat.    Eyes: Negative for pain and redness.   Respiratory: Negative for cough and shortness of breath.    Cardiovascular: Positive for leg swelling (LLE). Negative for chest pain and palpitations.   Gastrointestinal: Negative for abdominal pain, constipation, diarrhea, nausea and vomiting.   Genitourinary: Negative for dysuria, frequency and urgency.   Musculoskeletal: Positive for myalgias (leg soreness ).   Neurological: Negative for dizziness, weakness and headaches.   Psychiatric/Behavioral: Positive for memory loss. Negative for depression. The patient is nervous/anxious and has insomnia.       Physical Exam  Temp:  [36.3 °C (97.3 °F)-36.8 °C (98.3 °F)] 36.3 °C (97.3 °F)  Pulse:  [71-77] 71  Resp:  [16-20] 20  BP: (114-137)/(70-84) 114/70  SpO2:  [92 %-95 %] 92 %    Physical Exam  Vitals and nursing note reviewed.   Constitutional:       General: He is awake.      Appearance: He is well-developed. He is ill-appearing (chronically ill appearing).   HENT:      Head: Normocephalic and atraumatic.      Mouth/Throat:      Lips: Pink.      Mouth: Mucous membranes are moist.   Eyes:      General: Lids are normal.      Conjunctiva/sclera: Conjunctivae normal.      Pupils: Pupils are equal, round, and reactive to light.      Comments: PERRL   Cardiovascular:      Rate and Rhythm: Normal rate.      Pulses: Normal pulses.       Heart sounds: Normal heart sounds.   Pulmonary:      Effort: Pulmonary effort is normal.      Breath sounds: Normal breath sounds.   Abdominal:      General: Bowel sounds are normal. There is no distension.      Palpations: Abdomen is soft.      Tenderness: There is no abdominal tenderness.   Musculoskeletal:      Cervical back: Neck supple.      Right lower leg: No edema.      Left lower leg: No edema.      Comments: L knee contracture   Skin:     General: Skin is warm and dry.          Neurological:      General: No focal deficit present.      Mental Status: He is alert. He is disoriented and confused.      Gait: Gait normal.      Comments: Believes he is in [facility] unable to determine city   Psychiatric:         Attention and Perception: Attention normal.         Mood and Affect: Mood is anxious. Affect is labile.         Speech: Speech normal.         Behavior: Behavior is agitated and aggressive. Behavior is cooperative.         Thought Content: Thought content is delusional.         Cognition and Memory: Cognition is impaired. Memory is impaired. He exhibits impaired recent memory and impaired remote memory.         Judgment: Judgment is impulsive and inappropriate.     Fluids    Intake/Output Summary (Last 24 hours) at 4/20/2021 1353  Last data filed at 4/19/2021 1800  Gross per 24 hour   Intake 480 ml   Output --   Net 480 ml     Laboratory    Imaging  IR-US GUIDED PIV   Final Result    Ultrasound-guided PERIPHERAL IV INSERTION performed by    qualified nursing staff as above.      MR-BRAIN-W/O   Final Result      MRI of the brain without contrast within normal limits for age with moderate atrophy and mild white matter changes.      WO-QOGIUZV-2 VIEW   Final Result      No contraindication to MRI      DX-CHEST-LIMITED (1 VIEW)   Final Result      1.  Mild atelectasis      2.  No contraindication to MRI      DX-SKULL-LIMITED 3-   Final Result      Negative for metallic foreign body or  contraindication to MRI      CT-HEAD W/O   Final Result      No acute intracranial hemorrhage is identified.      Mild white matter hypodensity is present.  This is a nonspecific finding which usually is found to represent chronic microvascular disease in patient's of this demographic.  Demyelination, age indeterminant ischemia and gliosis are also common    possibilities.      Mild parenchymal atrophy      DX-CHEST-LIMITED (1 VIEW)   Final Result      No evidence of acute cardiopulmonary process.      IR-US GUIDED PIV   Final Result    Ultrasound-guided PERIPHERAL IV INSERTION performed by    qualified nursing staff as above.      IR-MIDLINE CATHETER INSERTION WO GUIDANCE > AGE 3   Final Result                  Ultrasound-guided midline placement performed by qualified nursing staff    as above.          IR-US GUIDED PIV   Final Result    Ultrasound-guided PERIPHERAL IV INSERTION performed by    qualified nursing staff as above.      IR-MIDLINE CATHETER INSERTION WO GUIDANCE > AGE 3   Final Result                  Ultrasound-guided midline placement performed by qualified nursing staff    as above.          US-KOSTAS SINGLE LEVEL BILAT   Final Result      CT-HEAD W/O   Final Result      No acute intracranial abnormality      DX-TIBIA AND FIBULA LEFT   Final Result      1.  Healed distal metaphyseal fractures of the left fibula and tibia with tibial IM layla in place.      2.  No acute fracture or dislocation.      3.  No radiopaque soft tissue foreign body.      DX-FEMUR-2+ LEFT   Final Result      1.  No radiographic evidence of acute traumatic injury left femur.      2.  Unchanged cortical thickening in the posterior aspect of the distal femoral diaphysis which may represent sequela of prior injury or infection.      3.  No soft tissue foreign body identified.      US-EXTREMITY VENOUS LOWER UNILAT LEFT   Final Result      DX-CHEST-PORTABLE (1 VIEW)   Final Result      No acute cardiopulmonary abnormality.          Assessment/Plan  * Wound of left leg- (present on admission)  Assessment & Plan  -Wound vac to LLE. Wound care following, appreciate assistance.   -Pain controlled, continue med regimen as currently ordered.   -Monitor for evidence of infection.   -Home health and home Wound VAC ordered    Encephalopathy, unspecified- (present on admission)  Assessment & Plan  -intermittently symptomatic.  Somewhat confused,  agitated.  Poor memory.    -Labs within normal limits. Clinical picture does not indicate infection.  -Stat CT head obtained and was negative.  -MRI within normal limits  -Depakote level low.  -No recent changes to medications.    Psychiatric disorder- (present on admission)  Assessment & Plan  -Unspecified.  -Continue Risperdal and Depakote.   -Psychiatry was consulted and deemed him incapacitated to leave AMA or make medical decisions.  -SLP repeated cognitive evaluation in Jan 2021- continues to recommend 24/7 supervision.   -Guardianship established  -Group home to evaluate per CM 4/19    Essential hypertension- (present on admission)  Assessment & Plan  -Well controlled on current regimen.   -Continue amlodipine.     Emphysema/COPD (HCC)- (present on admission)  Assessment & Plan  -Chronic and stable off medication.  -No acute exacerbation.     Protein malnutrition (HCC)- (present on admission)  Assessment & Plan  -Body mass index is 21.53 kg/m².-Slightly improving.   -Continue TID supplements.   -Encourage PO intake.    Flexion contracture of knee, 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)  Assessment & Plan  -Secondary to chronic wound in that area.  -Continue PT/OT.  -Does not seem to limit his mobility. Is frequently ambulatory.  -Continue PRN flexeril. Robaxin added 3/14/2021.     VTE prophylaxis: Ambulatory

## 2021-04-20 NOTE — PROGRESS NOTES
Assumed care of patient this AM, pt sleeping at this time. 1:1 sitter at bedside. VSS this am. Wound vac in place with continuous setting of 125.

## 2021-04-20 NOTE — WOUND TEAM
[facility]  Inpatient Services  Wound and Skin Care Progress Note    Admission Date: 8/7/2020     Last order of IP CONSULT TO WOUND CARE was found on 9/1/2020 from Hospital Encounter on 8/7/2020     HPI, PMH, SH: Reviewed    Unit where seen by Wound Team: [id number]    WOUND CONSULT/FOLLOW UP RELATED TO:  Left leg scheduled negative pressure wound therapy (npwt) dressing change and 2 layer compression wrap change.    WOUND TYPE, LOCATION, CHARACTERISTICS (Pressure Injuries: location, stage, POA or date identified)        Negative Pressure Wound Therapy 11/20/20 Leg Lateral;Posterior;Medial Left (Active)   Vacuum Serial Number [id number]    NPWT Pump Mode / Pressure Setting Ulta;125 mmHg    Dressing Type Medium;Black Foam (Regular)    Number of Foam Pieces Used 2    Canister Changed No    Output (mL) 30 mL    NEXT Dressing Change/Treatment Date 04/21/21    WOUND NURSE ONLY - Time Spent with Patient (mins) 90            Wound 11/19/20 Full Thickness Wound Leg Lateral;Posterior;Medial Left (Active)   Wound Image       Site Assessment Red;Pink    Periwound Assessment Scar tissue;Pink    Margins Defined edges;Attached edges    Closure Secondary intention    Drainage Amount Small    Drainage Description Serosanguineous    Treatments Cleansed;Site care;Compression    Wound Cleansing Approved Wound Cleanser    Periwound Protectant Skin Protectant Wipes to Periwound    Dressing Cleansing/Solutions Not Applicable    Dressing Options Wound Vac    Dressing Changed Changed    Dressing Status Clean;Dry;Intact    Dressing Change/Treatment Frequency Monday, Wednesday, Friday, and As Needed    NEXT Dressing Change/Treatment Date 04/21/21    NEXT Weekly Photo (Inpatient Only) 04/23/21    Non-staged Wound Description Partial thickness    Wound Length (cm) 12.6 cm    Wound Width (cm) 3 cm    Wound Depth (cm) 0.1 cm    Wound Surface Area (cm^2) 37.8 cm^2    Wound Volume (cm^3) 11.34 cm^3    Post-Procedure Length (cm) 15 cm     Post-Procedure Width (cm) 3.4 cm    Post-Procedure Depth (cm) 0.2 cm    Post-Procedure Surface Area (cm^2) 51 cm^2    Post-Procedure Volume (cm^3) 10.2 cm^3    Wound Healing % -11    Wound Bed Granulation (%) 100 %    Wound Bed Slough (%) 10 %    Wound Bed Granulation (%) - Post-Procedure 100 %    Tunneling (cm) 0 cm    Undermining (cm) 0 cm    Shape irregular x2    Wound Odor Foul    Pulses Left;2+;DP;PT    Exposed Structures None    WOUND NURSE ONLY - Time Spent with Patient (mins) 90              Lab Values:    Lab Results   Component Value Date/Time    WBC 7.1 04/13/2021 03:45 AM    RBC 4.20 (L) 04/13/2021 03:45 AM    HEMOGLOBIN 11.2 (L) 04/13/2021 03:45 AM    HEMATOCRIT 34.8 (L) 04/13/2021 03:45 AM    CREACTPROT 1.07 (H) 08/12/2020 01:55 PM    SEDRATEWES 85 (H) 08/07/2020 01:20 PM    HBA1C 5.7 (H) 11/09/2018 06:30 PM      Culture Results show:  Recent Results (from the past 720 hour(s))   CULTURE WOUND W/ GRAM STAIN    Collection Time: 09/01/20  2:00 PM    Specimen: Left Leg; Wound   Result Value Ref Range    Significant Indicator POS (POS)     Source WND     Site LEFT LEG     Culture Result - (A)     Gram Stain Result       Moderate Gram positive cocci.  Moderate Gram positive rods.      Culture Result (A)      Beta Hemolytic Streptococcus group A  Moderate growth      Culture Result (A)      Methicillin Resistant Staphylococcus aureus  Moderate growth      Culture Result (A)      Diphtheroids  Moderate growth  Mixed morphologies.     KOSTAS: 9/20/21   Left.    Doppler waveforms of the common femoral artery are of high amplitude and    triphasic.    Doppler waveforms at the ankle are brisk and triphasic.    Ankle-brachial index is normal.    Unable to obtained popliteal waveforms due to wound bandages.     Self Report / Pain Level: Pre-medicated     INTERVENTIONS BY WOUND TEAM: INTERVENTIONS BY WOUND TEAM:  Performed standard wound care which includes appropriate positioning, dressing removal and non-selective  debridement. Pictures and measurements obtained weekly if/when required.    Cleansed: Wound cleanser and gauze used to clean wound beds  Debridement: NA  Periwound: Cleansed with cleanser and gauze. Prepped with benzoin and paste rings. Drape bridge applied to bring track pad to lateral portion of leg.  Primary -  Half cut foam cut to shape of wound beds applied then secured with drape.    Secondary -  2 layer compression wrap applied.    Interdisciplinary consultation: Patient, Bedside RN, LPS APRN Aislinn    EVALUATION / RATIONALE FOR TREATMENT:  4/19/2021 wounds decreasing in size, pink and granular tissue continue NPWT and two layer compression   4/16/21:  Medial wound still decreasing in size, less progress on the lateral wound. Foul odor continues to be present, now concentrated in vac cannister, no other overt signs of infection.  Consider continuing with agata with next vac change to encourage tissue granulation.   04/14/21:  No biologic to be applied this week due to approval issues.  No change in wound.   4/12/21: Medial wound appears to have decreased in size. Biologic being applied by LPS APRN weekly. Per LPS will reapply Biologic on Friday 4/16/21.   4/9: debridement and biologic placement performed by LPS APN. Adaptic to remain in place during next change.  4/7: Biologic to be reapplied this week by LPS with possible debridement if deemed appropriate.  Wounds remain stable and appear to be to surface level. Y connect on tracpad still in use.   4/4/21: Received call pt had pulled trac pad off. Whole dressing changed. Y connected wounds with 2 separate trac pads to each wound to decrease risk of loss of NPWT. Bridged lateral trac pad up along thigh to keep out of compressed area to ease replacement if pt pulls off and making it more difficult for pt to dislodge.   4/2/2021 - Debridement performed by APRN as patient's wounds continue to fail to contract. Biologic in place to bilateral wound beds and will  be applied by RAFIAT Goldberg again next Friday 4/11/2021. During next vac change, adaptic to remain in place as it is protecting the biologic underneath.   3/30/21: Wound odor mild-moderate and wound bed bright red, now 100% granulation tissue.  Meredith-skin continues to be fragile/flakey; protected with hydrocolloid dressing to wound border.    3/26/21: Foul odor present at dressing removal, but significantly decreased after cleansing.  Continue current POC.  3/24/21 Wound bed red, with less drainage noted.  Strong, foul odor still present.  APRN to see pt regarding wound bed. Continue with current plan of care.   3/22/21: Area unchanged from previous dressing change.  Wound malodorous.  Meredith-skin dry and flaky at superior wound border, hydocolloid thin dressing placed.   3/13/21 Wounds failing to contract.  Will trial intermittent wound vac setting to encourage greater stimulation of cellular activity in wound bed.    3/10/2021: debrided wound, odor present, continue NPWT  3/6/12 area unchanged, odor persists  3/1/21: Lidocaine unavailable during this dressing change. Small amount of debridement completed with curette. Continued with silver powder for its antimicrobial properties.   2/27/21 skin bridge enlarging  2/24/2021: Excisional debridement by LPS APRN performed for rolled edges that were starting to form along the posterior and medial left leg wound. Moderate bleeding managed by administration of lidocaine injection. CSWD performed for slough. Wound bed is beefy red following debridement. Resume agata and silver foam to manage bioburden. Continue with CSWD with each dressing change.   2/22/2021: Rolled edges starting to form along posterior thigh wound bed - may require excisional debridement by provider. Minimal changes to actual wound bed. Continue CSWD, agata, and silver foam to manage bioburden.   2/19/21: minimal to no changes from last assessment. Continue CSWD, agata, and silver foam for  bioburden.       Goals: Steady decrease in wound area and depth weekly.    NURSING PLAN OF CARE ORDERS (X):    Dressing changes: See Dressing Care orders: X  Skin care: See Skin Care orders:   Rectal tube care: See Rectal Tube Care orders:   Other orders:      WOUND TEAM PLAN OF CARE:   Dressing changes by wound team:        Follow up 3 times weekly:                NPWT change 3 times weekly:  X,  NPWT changes 3x/ weekly with 2 layer compression wrap.  Follow up 1-2 times weekly:      Follow up Bi-Monthly:                   Follow up as needed:       Other (explain):     Anticipated discharge plans:  LTACH:        SNF/Rehab:       Home Health Care:           Outpatient Wound Center:            Self Care:           Other- GH, unsure if they can manage wound vac. May need to DC with compression wrap only. TBD.

## 2021-04-20 NOTE — DISCHARGE PLANNING
Received Choice form at 1441  Agency/Facility Name: [facility]   Referral sent per Choice form @ [id number]

## 2021-04-20 NOTE — PROGRESS NOTES
Hospital Medicine Twice Weekly Progress Note    Date of Service  4/19/2021    Chief Complaint  LLE wound infection    Hospital Course  Mr. Varela is a 65-year-old male with a PMHx of alcohol dependence, tobacco dependence, psychiatric disorder, and HTN who presented to the emergency department on 8/7/2020 with a left lower extremity wound infection. He was hospitalized at our facility last April and was evaluated by orthopedic surgery who recommended ongoing wound care. Wound cultures at that time grew MSSA and Streptococcus. He had been seen at [facility] earlier that week and prescribed antibiotics, but never filled the prescription.  An ultrasound of that extremity was done and was negative for DVT.  He was treated for sepsis and completed an antibiotic course on 9/16/2020. He was also found to have head lice and underwent treatment for that.  A repeat wound culture was done on 9/28/2020 and grew Strep A and Proteus. Infectious disease was consulted and recommended a 5-day course of IV zosyn which was completed on 10/5/2020. On 10/12/2020 the wound care team noticed increased inflammation to the proximal part of the wound bed and switched from a vera flow wound VAC to a regular wound VAC.  ID was again consulted and on 10/14/2020 recommended a 7-day course of antibiotics with meropenem which was completed on 10/22/2020. Additionally, he was noted to have intermittent agitation so was started on risperdal, depakote, and gabapentin per psychiatry recommendations.  On 11/20/2020 he underwent left lower extremity debridement with wound VAC placement. Since then he has remained stable but has been deemed incapacitated to make medical decisions so guardianship was pursued and granted on 1/29/21. Placement will likely be to a group home with home health services.     Interval Problem Update  Patient lying in bed, easily aroused.  He agrees his pain is well managed on current regimen.  He denies any other  problems.  He has tangential, slurred speech.  -Discussed with case management, group home was expected to evaluate patient over the weekend, however as of today they have not arrived.  -Home health order has been placed.  Home wound VAC pending.  -Continue current plan of care    Consultants/Specialty  Orthopedic surgery  Infectious disease  Psychiatry    Code Status  Full Code    Disposition  Guardianship established. Possible group home with home health vs SNF.     Review of Systems  Review of Systems   Constitutional: Positive for malaise/fatigue. Negative for fever.   Respiratory: Negative for cough and shortness of breath.    Cardiovascular: Positive for leg swelling (LLE). Negative for chest pain and palpitations.   Gastrointestinal: Negative for abdominal pain, constipation, diarrhea, nausea and vomiting.   Genitourinary: Negative for dysuria, frequency and urgency.   Musculoskeletal:        Denies pain   Neurological: Negative for dizziness, sensory change, speech change, focal weakness, weakness and headaches.   Psychiatric/Behavioral: Positive for memory loss. Negative for depression. The patient is nervous/anxious. The patient does not have insomnia.    All other systems reviewed and are negative.     Physical Exam  Temp:  [35.8 °C (96.5 °F)-37.2 °C (99 °F)] 37.2 °C (99 °F)  Pulse:  [62-81] 81  Resp:  [20] 20  BP: (138-155)/(64-88) 149/70  SpO2:  [93 %-95 %] 93 %    Physical Exam  Vitals and nursing note reviewed.   Constitutional:       General: He is awake.      Appearance: He is well-developed. He is ill-appearing (chronically ill appearing).   HENT:      Head: Normocephalic and atraumatic.      Mouth/Throat:      Lips: Pink.      Mouth: Mucous membranes are moist.   Eyes:      Conjunctiva/sclera: Conjunctivae normal.      Pupils: Pupils are equal, round, and reactive to light.      Comments: PERRL   Cardiovascular:      Rate and Rhythm: Normal rate and regular rhythm.      Pulses: Normal pulses.       Heart sounds: Normal heart sounds.   Pulmonary:      Effort: Pulmonary effort is normal.      Breath sounds: Normal breath sounds.   Abdominal:      General: Bowel sounds are normal. There is no distension or abdominal bruit.      Palpations: Abdomen is soft.      Tenderness: There is no abdominal tenderness.   Musculoskeletal:      Cervical back: Normal range of motion and neck supple.      Right lower leg: No edema.      Left lower leg: No edema.      Comments: L knee contracture   Skin:     General: Skin is warm and dry.          Neurological:      General: No focal deficit present.      Mental Status: He is alert. He is disoriented and confused.      Cranial Nerves: No cranial nerve deficit or dysarthria.      Motor: No weakness.      Gait: Gait normal.   Psychiatric:         Attention and Perception: Attention and perception normal.         Mood and Affect: Mood is anxious. Affect is not angry or inappropriate.         Speech: Speech is rapid and pressured and tangential.         Behavior: Behavior is hyperactive.         Cognition and Memory: Cognition is impaired. Memory is impaired. He exhibits impaired recent memory and impaired remote memory.         Judgment: Judgment is impulsive and inappropriate.     Fluids    Intake/Output Summary (Last 24 hours) at 4/19/2021 1728  Last data filed at 4/19/2021 1100  Gross per 24 hour   Intake 480 ml   Output --   Net 480 ml     Laboratory    Imaging  IR-US GUIDED PIV   Final Result    Ultrasound-guided PERIPHERAL IV INSERTION performed by    qualified nursing staff as above.      MR-BRAIN-W/O   Final Result      MRI of the brain without contrast within normal limits for age with moderate atrophy and mild white matter changes.      VH-UTIFLIZ-9 VIEW   Final Result      No contraindication to MRI      DX-CHEST-LIMITED (1 VIEW)   Final Result      1.  Mild atelectasis      2.  No contraindication to MRI      DX-SKULL-LIMITED 3-   Final Result      Negative for metallic  foreign body or contraindication to MRI      CT-HEAD W/O   Final Result      No acute intracranial hemorrhage is identified.      Mild white matter hypodensity is present.  This is a nonspecific finding which usually is found to represent chronic microvascular disease in patient's of this demographic.  Demyelination, age indeterminant ischemia and gliosis are also common    possibilities.      Mild parenchymal atrophy      DX-CHEST-LIMITED (1 VIEW)   Final Result      No evidence of acute cardiopulmonary process.      IR-US GUIDED PIV   Final Result    Ultrasound-guided PERIPHERAL IV INSERTION performed by    qualified nursing staff as above.      IR-MIDLINE CATHETER INSERTION WO GUIDANCE > AGE 3   Final Result                  Ultrasound-guided midline placement performed by qualified nursing staff    as above.          IR-US GUIDED PIV   Final Result    Ultrasound-guided PERIPHERAL IV INSERTION performed by    qualified nursing staff as above.      IR-MIDLINE CATHETER INSERTION WO GUIDANCE > AGE 3   Final Result                  Ultrasound-guided midline placement performed by qualified nursing staff    as above.          US-KOSTAS SINGLE LEVEL BILAT   Final Result      CT-HEAD W/O   Final Result      No acute intracranial abnormality      DX-TIBIA AND FIBULA LEFT   Final Result      1.  Healed distal metaphyseal fractures of the left fibula and tibia with tibial IM layla in place.      2.  No acute fracture or dislocation.      3.  No radiopaque soft tissue foreign body.      DX-FEMUR-2+ LEFT   Final Result      1.  No radiographic evidence of acute traumatic injury left femur.      2.  Unchanged cortical thickening in the posterior aspect of the distal femoral diaphysis which may represent sequela of prior injury or infection.      3.  No soft tissue foreign body identified.      US-EXTREMITY VENOUS LOWER UNILAT LEFT   Final Result      DX-CHEST-PORTABLE (1 VIEW)   Final Result      No acute cardiopulmonary  abnormality.         Assessment/Plan  * Wound of left leg- (present on admission)  Assessment & Plan  -Wound vac to LLE. Wound care following, appreciate assistance.   -Pain controlled, continue med regimen as currently ordered.   -Monitor for evidence of infection.   -Home health and home Wound VAC ordered    Encephalopathy, unspecified- (present on admission)  Assessment & Plan  -intermittently symptomatic.  Somewhat confused,  agitated.  Poor memory.    -Labs within normal limits. Clinical picture does not indicate infection.  -Stat CT head obtained and was negative.  -MRI within normal limits  -Depakote level low.  -No recent changes to medications.    Psychiatric disorder- (present on admission)  Assessment & Plan  -Unspecified.  -Continue Risperdal and Depakote.   -Psychiatry was consulted and deemed him incapacitated to leave AMA or make medical decisions.  -SLP repeated cognitive evaluation in Jan 2021- continues to recommend 24/7 supervision.   -Guardianship established  -Group home to evaluate per CM 4/19    Essential hypertension- (present on admission)  Assessment & Plan  -Well controlled on current regimen.   -Continue amlodipine.     Emphysema/COPD (HCC)- (present on admission)  Assessment & Plan  -Chronic and stable off medication.  -No acute exacerbation.     Protein malnutrition (HCC)- (present on admission)  Assessment & Plan  -Body mass index is 21.53 kg/m².-Slightly improving.   -Continue TID supplements.   -Encourage PO intake.    Flexion contracture of knee, 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)  Assessment & Plan  -Secondary to chronic wound in that area.  -Continue PT/OT.  -Does not seem to limit his mobility. Is frequently ambulatory.  -Continue PRN flexeril. Robaxin added 3/14/2021.     VTE prophylaxis: Ambulatory    FELIPE Hull

## 2021-04-20 NOTE — CARE PLAN
1:1 Sitter in place for patient safety.     Care Plan  Problem: Safety  Goal: Will remain free from injury  Outcome: PROGRESSING AS EXPECTED     Problem: Discharge Barriers/Planning  Goal: Patient's continuum of care needs will be met  Outcome: PROGRESSING AS EXPECTED     Problem: Psychosocial Needs:  Goal: Level of anxiety will decrease  Outcome: PROGRESSING AS EXPECTED

## 2021-04-21 PROCEDURE — 700102 HCHG RX REV CODE 250 W/ 637 OVERRIDE(OP): Performed by: NURSE PRACTITIONER

## 2021-04-21 PROCEDURE — A9270 NON-COVERED ITEM OR SERVICE: HCPCS | Performed by: NURSE PRACTITIONER

## 2021-04-21 PROCEDURE — 770001 HCHG ROOM/CARE - MED/SURG/GYN PRIV*

## 2021-04-21 PROCEDURE — 700101 HCHG RX REV CODE 250: Performed by: NURSE PRACTITIONER

## 2021-04-21 PROCEDURE — 97605 NEG PRS WND THER DME<=50SQCM: CPT

## 2021-04-21 PROCEDURE — 302098 PASTE RING (FLAT): Performed by: NURSE PRACTITIONER

## 2021-04-21 RX ADMIN — DOCUSATE SODIUM 50 MG AND SENNOSIDES 8.6 MG 2 TABLET: 8.6; 5 TABLET, FILM COATED ORAL at 18:06

## 2021-04-21 RX ADMIN — GABAPENTIN 300 MG: 300 CAPSULE ORAL at 18:07

## 2021-04-21 RX ADMIN — TRAZODONE HYDROCHLORIDE 50 MG: 100 TABLET ORAL at 19:16

## 2021-04-21 RX ADMIN — METHOCARBAMOL TABLETS 500 MG: 500 TABLET, COATED ORAL at 12:04

## 2021-04-21 RX ADMIN — RISPERIDONE 1 MG: 1 TABLET ORAL at 18:07

## 2021-04-21 RX ADMIN — GABAPENTIN 300 MG: 300 CAPSULE ORAL at 12:04

## 2021-04-21 RX ADMIN — METHOCARBAMOL TABLETS 500 MG: 500 TABLET, COATED ORAL at 18:06

## 2021-04-21 RX ADMIN — OXYCODONE 5 MG: 5 TABLET ORAL at 19:16

## 2021-04-21 RX ADMIN — METHOCARBAMOL TABLETS 500 MG: 500 TABLET, COATED ORAL at 06:00

## 2021-04-21 RX ADMIN — DIVALPROEX SODIUM 125 MG: 125 CAPSULE ORAL at 19:15

## 2021-04-21 RX ADMIN — MORPHINE SULFATE 15 MG: 15 TABLET, FILM COATED, EXTENDED RELEASE ORAL at 06:00

## 2021-04-21 RX ADMIN — DIVALPROEX SODIUM 125 MG: 125 CAPSULE ORAL at 06:00

## 2021-04-21 RX ADMIN — METHOCARBAMOL TABLETS 500 MG: 500 TABLET, COATED ORAL at 19:15

## 2021-04-21 RX ADMIN — RISPERIDONE 0.5 MG: 0.5 TABLET ORAL at 06:00

## 2021-04-21 RX ADMIN — OXYCODONE 5 MG: 5 TABLET ORAL at 12:20

## 2021-04-21 RX ADMIN — MORPHINE SULFATE 15 MG: 15 TABLET, FILM COATED, EXTENDED RELEASE ORAL at 18:06

## 2021-04-21 RX ADMIN — HYDROXYZINE HYDROCHLORIDE 25 MG: 50 TABLET, FILM COATED ORAL at 16:25

## 2021-04-21 RX ADMIN — GABAPENTIN 300 MG: 300 CAPSULE ORAL at 06:00

## 2021-04-21 RX ADMIN — LIDOCAINE 1 PATCH: 50 PATCH CUTANEOUS at 12:04

## 2021-04-21 RX ADMIN — AMLODIPINE BESYLATE 5 MG: 5 TABLET ORAL at 06:00

## 2021-04-21 RX ADMIN — HYDROXYZINE HYDROCHLORIDE 25 MG: 50 TABLET, FILM COATED ORAL at 23:37

## 2021-04-21 RX ADMIN — DIVALPROEX SODIUM 125 MG: 125 CAPSULE ORAL at 14:22

## 2021-04-21 ASSESSMENT — PAIN DESCRIPTION - PAIN TYPE
TYPE: ACUTE PAIN

## 2021-04-21 NOTE — DISCHARGE PLANNING
Received Choice form at 1028  Agency/[facility]ility Name: [facility]  Referral sent per Choice form @ 1029     @1131  [facility]ency[facility]Facility Name: [facility]  Outcome: Voice mail left regarding referral. Requested a call back.    @1200  [facility]Facility [facility]: [facility]  Spoke To: Admission   Outcome: Per Admission referral is currently being reviewed.    Spoke with niece and understood should talk to hospitalist regarding medication.

## 2021-04-21 NOTE — DISCHARGE PLANNING
"Anticipated Discharge Disposition:   Group home with home health, wound vac    Action:   RN CM received voicemail from Caron from [facility]. Per Caron, she needs confirmation from [facility] PCP that pt will be followed. EKATERINA YANES called Caron, no answer, left voicemail for call back. EKATERINA YANES emailed Aletha Grady, pt's Legal Guardian.     Addendum:  RN CM received call and email from Aletha pt's Legal Guardian with  information:   Peterson Liang is the  of [facility]   phone number: [phone]  fax number: [phone]   email: [email or web address].    Per Aletha's email, \"At this time we have not received any funds on behalf of Mr. Varela as he did not have a bank account open when we received his case. We have also not received any of his social security payments to date because his funds were being deposited onto a direct express card and we have had to request them to be redirected to our account. At this time we are unsure when we will begin receiving his SSA funds.\" JAI emailed to [facility].    Barriers to Discharge:   Group home acceptance  Home health acceptance  PCP  Wound vac set up  JAI    Plan:   Hospital Care Management will continue to follow and assist with discharge planning needs.        "

## 2021-04-21 NOTE — CARE PLAN
Problem: Safety  Goal: Will remain free from injury  Outcome: PROGRESSING SLOWER THAN EXPECTED  Note: Pt is impulsive and getting out of bed without assistance or calling for help.  Pt is not oriented to situation or time.  This RN is reorienting patient as appropriate, utilizing bed alarm, leaving all belongings within reach.  This RN is encouraging pt. To call for help.     Problem: Safety  Goal: Will remain free from falls  Description: Pt mobilizes frequently independently.   Outcome: PROGRESSING AS EXPECTED  Note: Pt currently has not experienced any falls.

## 2021-04-21 NOTE — DISCHARGE PLANNING
Anticipated Discharge Disposition:   Group home with home health, wound vac    Action:   0949: Per DPA, [facility] declined due to insufficient staffing. RN JOAQUÍN called pt's Legal Guardian, no answer, left voicemail for call back.    1024: Received call back from Legal Guardian. Discharge plan discussed. Received HH choices. Choice form faxed to DPA. Legal Guardian to get contact information for GH, PCP and appointment.     Barriers to Discharge:   Group home acceptance  Home health acceptance  PCP  Wound vac set up  JAI    Plan:   Follow up with Legal Guardian.  Hospital Care Management will continue to follow and assist with discharge planning needs.

## 2021-04-21 NOTE — DISCHARGE PLANNING
Agency/Facility Name: [facility]  Outcome: Per Faxed received referral decline due to Insufficient staff.

## 2021-04-21 NOTE — WOUND TEAM
[facility]  Inpatient Services  Wound and Skin Care Progress Note    Admission Date: 8/7/2020     Last order of IP CONSULT TO WOUND CARE was found on 9/1/2020 from Hospital Encounter on 8/7/2020     HPI, PMH, SH: Reviewed    Unit where seen by Wound Team: [id number]/00    WOUND CONSULT/FOLLOW UP RELATED TO:  Left leg scheduled negative pressure wound therapy (npwt) dressing change and 2 layer compression wrap change.    WOUND TYPE, LOCATION, CHARACTERISTICS (Pressure Injuries: location, stage, POA or date identified)        Negative Pressure Wound Therapy 11/20/20 Leg Lateral;Posterior;Medial Left (Active)   Vacuum Serial Number [id number] 04/16/21 2000   NPWT Pump Mode / Pressure Setting Ulta;125 mmHg 04/21/21 1500   Dressing Type Medium;Black Foam (Regular) 04/21/21 1500   Number of Foam Pieces Used 3 04/21/21 1500   Canister Changed No 04/21/21 1500   NEXT Dressing Change/Treatment Date 04/23/21 04/21/21 1500           Wound 11/19/20 Full Thickness Wound Leg Lateral;Posterior;Medial Left (Active)       0   Site Assessment Tan;Granulation tissue    Periwound Assessment Scar tissue;Pink    Margins Attached edges    Closure Secondary intention    Drainage Amount Scant    Drainage Description Serosanguineous    Treatments Site care    Wound Cleansing Approved Wound Cleanser    Periwound Protectant Benzoin;Paste Ring    Dressing Cleansing/Solutions Not Applicable    Dressing Options Wound Vac    Dressing Changed Changed    Dressing Status Intact    Dressing Change/Treatment Frequency Monday, Wednesday, Friday, and As Needed    NEXT Dressing Change/Treatment Date 04/23/21    NEXT Weekly Photo (Inpatient Only) 04/23/21    Non-staged Wound Description Partial thickness    Wound Length (cm) 12.6 cm    Wound Width (cm) 3 cm    Wound Depth (cm) 0.1 cm    Wound Surface Area (cm^2) 37.8 cm^2    Wound Volume (cm^3) 11.34 cm^3    Post-Procedure Length (cm) 15 cm    Post-Procedure Width (cm) 3.4 cm    Post-Procedure  Depth (cm) 0.2 cm    Post-Procedure Surface Area (cm^2) 51 cm^2    Post-Procedure Volume (cm^3) 10.2 cm^3    Wound Healing % -11    Wound Bed Granulation (%) 100 %    Wound Bed Slough (%) 10 %    Wound Bed Granulation (%) - Post-Procedure 100 %    Tunneling (cm) 0 cm    Undermining (cm) 0 cm    Shape irregular x2    Wound Odor Mild    Pulses Left;2+;DP;PT    Exposed Structures None              Lab Values:    Lab Results   Component Value Date/Time    WBC 7.1 04/13/2021 03:45 AM    RBC 4.20 (L) 04/13/2021 03:45 AM    HEMOGLOBIN 11.2 (L) 04/13/2021 03:45 AM    HEMATOCRIT 34.8 (L) 04/13/2021 03:45 AM    CREACTPROT 1.07 (H) 08/12/2020 01:55 PM    SEDRATEWES 85 (H) 08/07/2020 01:20 PM    HBA1C 5.7 (H) 11/09/2018 06:30 PM      Culture Results show:  Recent Results (from the past 720 hour(s))   CULTURE WOUND W/ GRAM STAIN    Collection Time: 09/01/20  2:00 PM    Specimen: Left Leg; Wound   Result Value Ref Range    Significant Indicator POS (POS)     Source WND     Site LEFT LEG     Culture Result - (A)     Gram Stain Result       Moderate Gram positive cocci.  Moderate Gram positive rods.      Culture Result (A)      Beta Hemolytic Streptococcus group A  Moderate growth      Culture Result (A)      Methicillin Resistant Staphylococcus aureus  Moderate growth      Culture Result (A)      Diphtheroids  Moderate growth  Mixed morphologies.     KOSTAS: 9/20/21   Left.    Doppler waveforms of the common femoral artery are of high amplitude and    triphasic.    Doppler waveforms at the ankle are brisk and triphasic.    Ankle-brachial index is normal.    Unable to obtained popliteal waveforms due to wound bandages.     Self Report / Pain Level: Pre-medicated     INTERVENTIONS BY WOUND TEAM: INTERVENTIONS BY WOUND TEAM:  Performed standard wound care which includes appropriate positioning, dressing removal and non-selective debridement. Pictures and measurements obtained weekly if/when required.    Cleansed: Wound cleanser and  gauze used to clean wound beds  Debridement: NA  Periwound: Cleansed with cleanser and gauze. Prepped with benzoin and paste rings..  Primary -  Half cut foam cut to shape of wound beds applied then secured with drape. mepielx applied to offload tubing.    Secondary -  2 layer compression wrap applied.    Interdisciplinary consultation: Patient, Bedside RN,     EVALUATION / RATIONALE FOR TREATMENT:    4/21: agata applied today as no biologic was placed last time. Wounds remain stable.Was asked to check lateral leg for pressure injury. No evidence of pressure injury to medial or lateral leg.    4/19/2021 wounds decreasing in size, pink and granular tissue continue NPWT and two layer compression.   4/16/21:  Medial wound still decreasing in size, less progress on the lateral wound. Foul odor continues to be present, now concentrated in vac cannister, no other overt signs of infection.  Consider continuing with agata with next vac change to encourage tissue granulation.   04/14/21:  No biologic to be applied this week due to approval issues.  No change in wound.   4/12/21: Medial wound appears to have decreased in size. Biologic being applied by LPS APRN weekly. Per LPS will reapply Biologic on Friday 4/16/21.   4/9: debridement and biologic placement performed by LPS APN. Adaptic to remain in place during next change.  4/7: Biologic to be reapplied this week by LPS with possible debridement if deemed appropriate.  Wounds remain stable and appear to be to surface level. Y connect on tracpad still in use.   4/4/21: Received call pt had pulled trac pad off. Whole dressing changed. Y connected wounds with 2 separate trac pads to each wound to decrease risk of loss of NPWT. Bridged lateral trac pad up along thigh to keep out of compressed area to ease replacement if pt pulls off and making it more difficult for pt to dislodge.   4/2/2021 - Debridement performed by APRN as patient's wounds continue to fail to contract.  Biologic in place to bilateral wound beds and will be applied by RAFITA Goldberg again next Friday 4/11/2021. During next vac change, adaptic to remain in place as it is protecting the biologic underneath.   3/30/21: Wound odor mild-moderate and wound bed bright red, now 100% granulation tissue.  Meredith-skin continues to be fragile/flakey; protected with hydrocolloid dressing to wound border.    3/26/21: Foul odor present at dressing removal, but significantly decreased after cleansing.  Continue current POC.  3/24/21 Wound bed red, with less drainage noted.  Strong, foul odor still present.  APRN to see pt regarding wound bed. Continue with current plan of care.   3/22/21: Area unchanged from previous dressing change.  Wound malodorous.  Meredith-skin dry and flaky at superior wound border, hydocolloid thin dressing placed.   3/13/21 Wounds failing to contract.  Will trial intermittent wound vac setting to encourage greater stimulation of cellular activity in wound bed.    3/10/2021: debrided wound, odor present, continue NPWT  3/6/12 area unchanged, odor persists  3/1/21: Lidocaine unavailable during this dressing change. Small amount of debridement completed with curette. Continued with silver powder for its antimicrobial properties.   2/27/21 skin bridge enlarging  2/24/2021: Excisional debridement by LPS APRN performed for rolled edges that were starting to form along the posterior and medial left leg wound. Moderate bleeding managed by administration of lidocaine injection. CSWD performed for slough. Wound bed is beefy red following debridement. Resume agata and silver foam to manage bioburden. Continue with CSWD with each dressing change.   2/22/2021: Rolled edges starting to form along posterior thigh wound bed - may require excisional debridement by provider. Minimal changes to actual wound bed. Continue CSWD, agata, and silver foam to manage bioburden.   2/19/21: minimal to no changes from last assessment.  Continue CSWD, agata, and silver foam for bioburden.       Goals: Steady decrease in wound area and depth weekly.    NURSING PLAN OF CARE ORDERS (X):    Dressing changes: See Dressing Care orders: X  Skin care: See Skin Care orders:   Rectal tube care: See Rectal Tube Care orders:   Other orders:      WOUND TEAM PLAN OF CARE:   Dressing changes by wound team:        Follow up 3 times weekly:                NPWT change 3 times weekly:  X,  NPWT changes 3x/ weekly with 2 layer compression wrap.  Follow up 1-2 times weekly:      Follow up Bi-Monthly:                   Follow up as needed:       Other (explain):     Anticipated discharge plans:  LTACH:        SNF/Rehab:       Home Health Care:           Outpatient Wound Center:            Self Care:           Other- GH,

## 2021-04-22 PROCEDURE — A9270 NON-COVERED ITEM OR SERVICE: HCPCS | Performed by: NURSE PRACTITIONER

## 2021-04-22 PROCEDURE — 700102 HCHG RX REV CODE 250 W/ 637 OVERRIDE(OP): Performed by: NURSE PRACTITIONER

## 2021-04-22 PROCEDURE — 770001 HCHG ROOM/CARE - MED/SURG/GYN PRIV*

## 2021-04-22 RX ADMIN — OXYCODONE 5 MG: 5 TABLET ORAL at 17:08

## 2021-04-22 RX ADMIN — DIVALPROEX SODIUM 125 MG: 125 CAPSULE ORAL at 04:28

## 2021-04-22 RX ADMIN — AMLODIPINE BESYLATE 5 MG: 5 TABLET ORAL at 04:28

## 2021-04-22 RX ADMIN — RISPERIDONE 0.5 MG: 0.5 TABLET ORAL at 04:31

## 2021-04-22 RX ADMIN — RISPERIDONE 1 MG: 1 TABLET ORAL at 17:08

## 2021-04-22 RX ADMIN — METHOCARBAMOL TABLETS 500 MG: 500 TABLET, COATED ORAL at 11:31

## 2021-04-22 RX ADMIN — DIVALPROEX SODIUM 125 MG: 125 CAPSULE ORAL at 13:44

## 2021-04-22 RX ADMIN — OXYCODONE 5 MG: 5 TABLET ORAL at 11:34

## 2021-04-22 RX ADMIN — MORPHINE SULFATE 15 MG: 15 TABLET, FILM COATED, EXTENDED RELEASE ORAL at 17:08

## 2021-04-22 RX ADMIN — HYDROXYZINE HYDROCHLORIDE 25 MG: 50 TABLET, FILM COATED ORAL at 17:07

## 2021-04-22 RX ADMIN — METHOCARBAMOL TABLETS 500 MG: 500 TABLET, COATED ORAL at 17:07

## 2021-04-22 RX ADMIN — GABAPENTIN 300 MG: 300 CAPSULE ORAL at 11:31

## 2021-04-22 RX ADMIN — TRAZODONE HYDROCHLORIDE 50 MG: 100 TABLET ORAL at 20:20

## 2021-04-22 RX ADMIN — GABAPENTIN 300 MG: 300 CAPSULE ORAL at 04:28

## 2021-04-22 RX ADMIN — MORPHINE SULFATE 15 MG: 15 TABLET, FILM COATED, EXTENDED RELEASE ORAL at 04:27

## 2021-04-22 RX ADMIN — METHOCARBAMOL TABLETS 500 MG: 500 TABLET, COATED ORAL at 04:28

## 2021-04-22 RX ADMIN — DOCUSATE SODIUM 50 MG AND SENNOSIDES 8.6 MG 2 TABLET: 8.6; 5 TABLET, FILM COATED ORAL at 17:07

## 2021-04-22 RX ADMIN — METHOCARBAMOL TABLETS 500 MG: 500 TABLET, COATED ORAL at 20:20

## 2021-04-22 RX ADMIN — GABAPENTIN 300 MG: 300 CAPSULE ORAL at 17:07

## 2021-04-22 RX ADMIN — DIVALPROEX SODIUM 125 MG: 125 CAPSULE ORAL at 20:20

## 2021-04-22 NOTE — CARE PLAN
1:1 sitter put back in place pt was setting the bed alarm off and harming himself when he heard the noises.  Problem: Safety  Goal: Will remain free from injury  Outcome: NOT MET  Goal: Will remain free from falls  Description: Pt mobilizes frequently independently.   Outcome: NOT MET     Problem: Infection  Goal: Will remain free from infection  Outcome: NOT MET

## 2021-04-22 NOTE — CARE PLAN
Problem: Safety  Goal: Will remain free from injury  Outcome: PROGRESSING AS EXPECTED  Goal: Will remain free from falls  Description: Pt mobilizes frequently independently.   Outcome: PROGRESSING AS EXPECTED     Problem: Infection  Goal: Will remain free from infection  Outcome: PROGRESSING AS EXPECTED     Problem: Venous Thromboembolism (VTW)/Deep Vein Thrombosis (DVT) Prevention:  Goal: Patient will participate in Venous Thrombosis (VTE)/Deep Vein Thrombosis (DVT)Prevention Measures  Outcome: PROGRESSING AS EXPECTED     Problem: Bowel/Gastric:  Goal: Normal bowel function is maintained or improved  Outcome: PROGRESSING AS EXPECTED  Goal: Will not experience complications related to bowel motility  Outcome: PROGRESSING AS EXPECTED     Problem: Fluid Volume:  Goal: Will maintain balanced intake and output  Outcome: PROGRESSING AS EXPECTED     Problem: Pain Management  Goal: Pain level will decrease to patient's comfort goal  Outcome: PROGRESSING AS EXPECTED     Problem: Mobility  Goal: Risk for activity intolerance will decrease  Outcome: PROGRESSING AS EXPECTED     Problem: Skin Integrity  Goal: Risk for impaired skin integrity will decrease  Outcome: PROGRESSING AS EXPECTED     Problem: Knowledge Deficit  Goal: Knowledge of disease process/condition, treatment plan, diagnostic tests, and medications will improve  Outcome: PROGRESSING SLOWER THAN EXPECTED  Goal: Knowledge of the prescribed therapeutic regimen will improve  Outcome: PROGRESSING SLOWER THAN EXPECTED     Problem: Discharge Barriers/Planning  Goal: Patient's continuum of care needs will be met  Outcome: PROGRESSING SLOWER THAN EXPECTED     Problem: Psychosocial Needs:  Goal: Level of anxiety will decrease  Outcome: PROGRESSING SLOWER THAN EXPECTED

## 2021-04-22 NOTE — PROGRESS NOTES
Pt becoming more impulsive.  Pt jumping out of bed to leave room and use bathroom without calling for assistance.  Remote sitter initiated

## 2021-04-22 NOTE — DISCHARGE PLANNING
Agency/Facility Name: [facility]  Spoke To: Lakia  Outcome: [facility] are waiting for [facility] and guardian does not want patient discharging over weekend so they will take him on Monday.

## 2021-04-22 NOTE — DISCHARGE PLANNING
Anticipated Discharge Disposition:   Group home with home health, wound vac    Action:   RN CM called [facility] for wound vac. Spoke to Denise. Informed Denise that [facility] will accept pending confirmation with [facility] to accept pt.    Informed APRN via Voalte that signature is needed for wound vac order.    EKATERINA YANES called Peterson from [facility] [place]. Informed that JAI was emailed and will need signed copy back for [facility] Director to sign.    Addendum:  Received JAI signed by Peterson from . Emailed to [facility] Leadership.     RN CM received signed JAI by [facility] Director, emailed to Peterson from [facility] [place].    Received signed [place] order form. Faxed to [facility] with other requested documents.    Barriers to Discharge:   Group home acceptance  JAI  Home health acceptance  PCP  Wound vac set up    Plan:   Hospital Care Management will continue to follow and assist with discharge planning needs.

## 2021-04-22 NOTE — DISCHARGE PLANNING
Anticipated Discharge Disposition:   Group home with home health, wound vac    Action:   EKATERINA YANES called [facility]. No answer, left voicemail for call back.     RN CM received call back from Peterson. Peterson requested for JAI to be emailed to him at [email or web address].     Barriers to Discharge:   Group home acceptance  JAI  Home health acceptance  PCP  Wound vac set up     Plan:   Hospital Care Management will continue to follow and assist with discharge planning needs.

## 2021-04-23 PROCEDURE — 97605 NEG PRS WND THER DME<=50SQCM: CPT

## 2021-04-23 PROCEDURE — 700102 HCHG RX REV CODE 250 W/ 637 OVERRIDE(OP): Performed by: NURSE PRACTITIONER

## 2021-04-23 PROCEDURE — A9270 NON-COVERED ITEM OR SERVICE: HCPCS | Performed by: NURSE PRACTITIONER

## 2021-04-23 PROCEDURE — 302098 PASTE RING (FLAT): Performed by: NURSE PRACTITIONER

## 2021-04-23 PROCEDURE — 700101 HCHG RX REV CODE 250: Performed by: NURSE PRACTITIONER

## 2021-04-23 PROCEDURE — 770001 HCHG ROOM/CARE - MED/SURG/GYN PRIV*

## 2021-04-23 RX ADMIN — METHOCARBAMOL TABLETS 500 MG: 500 TABLET, COATED ORAL at 16:15

## 2021-04-23 RX ADMIN — METHOCARBAMOL TABLETS 500 MG: 500 TABLET, COATED ORAL at 11:35

## 2021-04-23 RX ADMIN — DOCUSATE SODIUM 50 MG AND SENNOSIDES 8.6 MG 2 TABLET: 8.6; 5 TABLET, FILM COATED ORAL at 16:15

## 2021-04-23 RX ADMIN — TRAZODONE HYDROCHLORIDE 50 MG: 100 TABLET ORAL at 20:39

## 2021-04-23 RX ADMIN — AMLODIPINE BESYLATE 5 MG: 5 TABLET ORAL at 04:47

## 2021-04-23 RX ADMIN — DIVALPROEX SODIUM 125 MG: 125 CAPSULE ORAL at 14:59

## 2021-04-23 RX ADMIN — LIDOCAINE 1 PATCH: 50 PATCH CUTANEOUS at 11:35

## 2021-04-23 RX ADMIN — MORPHINE SULFATE 15 MG: 15 TABLET, FILM COATED, EXTENDED RELEASE ORAL at 16:15

## 2021-04-23 RX ADMIN — GABAPENTIN 300 MG: 300 CAPSULE ORAL at 16:15

## 2021-04-23 RX ADMIN — RISPERIDONE 1 MG: 1 TABLET ORAL at 16:15

## 2021-04-23 RX ADMIN — DIVALPROEX SODIUM 125 MG: 125 CAPSULE ORAL at 04:47

## 2021-04-23 RX ADMIN — OXYCODONE 5 MG: 5 TABLET ORAL at 20:39

## 2021-04-23 RX ADMIN — METHOCARBAMOL TABLETS 500 MG: 500 TABLET, COATED ORAL at 20:39

## 2021-04-23 RX ADMIN — MORPHINE SULFATE 15 MG: 15 TABLET, FILM COATED, EXTENDED RELEASE ORAL at 04:47

## 2021-04-23 RX ADMIN — GABAPENTIN 300 MG: 300 CAPSULE ORAL at 04:47

## 2021-04-23 RX ADMIN — DIVALPROEX SODIUM 125 MG: 125 CAPSULE ORAL at 23:06

## 2021-04-23 RX ADMIN — OXYCODONE 5 MG: 5 TABLET ORAL at 11:35

## 2021-04-23 RX ADMIN — OXYCODONE 5 MG: 5 TABLET ORAL at 04:47

## 2021-04-23 RX ADMIN — GABAPENTIN 300 MG: 300 CAPSULE ORAL at 11:35

## 2021-04-23 RX ADMIN — RISPERIDONE 0.5 MG: 0.5 TABLET ORAL at 04:48

## 2021-04-23 RX ADMIN — METHOCARBAMOL TABLETS 500 MG: 500 TABLET, COATED ORAL at 04:48

## 2021-04-23 ASSESSMENT — PAIN DESCRIPTION - PAIN TYPE
TYPE: CHRONIC PAIN
TYPE: ACUTE PAIN;CHRONIC PAIN
TYPE: CHRONIC PAIN

## 2021-04-23 NOTE — CARE PLAN
1:1 sitter in place for patient safety and outbursts due to confusion and not understanding his location or situation.     Care Plan  Problem: Safety  Goal: Will remain free from injury  Outcome: NOT MET  Goal: Will remain free from falls  Description: Pt mobilizes frequently independently.   Outcome: PROGRESSING AS EXPECTED     Problem: Infection  Goal: Will remain free from infection  Outcome: PROGRESSING AS EXPECTED     Problem: Psychosocial Needs:  Goal: Level of anxiety will decrease  Outcome: PROGRESSING AS EXPECTED

## 2021-04-23 NOTE — DISCHARGE PLANNING
Anticipated Discharge Disposition:   Group home with home health, wound vac    Action:   RN CM [phone], ext [phone]) asking about group home setting and documentation. [facility]. RN CM called back, unable to reach Aniya. EKATERINA YANES called [facility] from [facility] ([phone]). Informed Denise that pt is going to a group home setting and not a SNF, and all documentation were sent. Per Denise, she will inform Aniya. Per Denise, pending insurance auth.    EKATERINA YANES called Caron from [facility]. No answer, left voicemail for call back.     Signed JAI emailed to  Admin Peterson and Legal Guardian.  Home health acceptance pending confirmation from [facility] for PCP.    Barriers to Discharge:   Home health acceptance  PCP  Wound vac set up, pending insurance auth    Plan:   Hospital Care Management will continue to follow and assist with discharge planning needs.

## 2021-04-23 NOTE — WOUND TEAM
[facility]  Inpatient Services  Wound and Skin Care Progress Note    Admission Date: 8/7/2020     Last order of IP CONSULT TO WOUND CARE was found on 9/1/2020 from Hospital Encounter on 8/7/2020     HPI, PMH, SH: Reviewed    Unit where seen by Wound Team: [id number]    WOUND CONSULT/FOLLOW UP RELATED TO:  Left leg scheduled negative pressure wound therapy (npwt) dressing change and 2 layer compression wrap change.    WOUND TYPE, LOCATION, CHARACTERISTICS (Pressure Injuries: location, stage, POA or date identified)     Negative Pressure Wound Therapy 11/20/20 Leg Lateral;Posterior;Medial Left (Active)   Vacuum Serial Number [id number] 04/22/21 0749   NPWT Pump Mode / Pressure Setting Continuous;125 mmHg 04/23/21 1200   Dressing Type Large;Black Foam (Regular) 04/23/21 1200   Number of Foam Pieces Used 3 04/23/21 1200   Canister Changed No 04/23/21 1200   Output (mL) 500 mL 04/22/21 1300   NEXT Dressing Change/Treatment Date 04/26/21 04/23/21 1200   WOUND NURSE ONLY - Time Spent with Patient (mins) 60 04/23/21 1200           Wound 11/19/20 Full Thickness Wound Leg Lateral;Posterior;Medial Left (Active)   Wound Image    04/23/21 1200   Site Assessment Red;Bleeding;Yellow;Other (Comment) 04/23/21 1200   Periwound Assessment Scar tissue;Edema 04/23/21 1200   Margins Defined edges;Attached edges 04/23/21 1200   Closure Secondary intention 04/23/21 1200   Drainage Amount Small 04/23/21 1200   Drainage Description Serosanguineous 04/23/21 1200   Treatments Cleansed;Compression;Site care 04/23/21 1200   Wound Cleansing Approved Wound Cleanser 04/23/21 1200   Periwound Protectant Drape;Paste Ring;Skin Protectant Wipes to Periwound 04/23/21 1200   Dressing Cleansing/Solutions Not Applicable 04/23/21 1200   Dressing Options Wound Vac 04/23/21 1200   Dressing Changed Changed 04/23/21 1200   Dressing Status Clean;Dry;Intact 04/23/21 1200   Dressing Change/Treatment Frequency Monday, Wednesday, Friday, and As Needed  04/23/21 1200   NEXT Dressing Change/Treatment Date 04/26/21 04/23/21 1200   NEXT Weekly Photo (Inpatient Only) 04/23/21 04/21/21 1500   Non-staged Wound Description Full thickness 04/23/21 1200   Wound Length (cm) 12.6 cm 02/22/21 0830   Wound Width (cm) 3 cm 02/22/21 0830   Wound Depth (cm) 0.1 cm 04/16/21 0914   Wound Surface Area (cm^2) 37.8 cm^2 02/22/21 0830   Wound Volume (cm^3) 11.34 cm^3 02/22/21 0830   Post-Procedure Length (cm) 15 cm 02/03/21 1100   Post-Procedure Width (cm) 3.4 cm 02/03/21 1100   Post-Procedure Depth (cm) 0.2 cm 02/03/21 1100   Post-Procedure Surface Area (cm^2) 51 cm^2 02/03/21 1100   Post-Procedure Volume (cm^3) 10.2 cm^3 02/03/21 1100   Wound Healing % -11 02/22/21 0830   Wound Bed Granulation (%) 100 % 03/30/21 0900   Wound Bed Slough (%) 10 % 03/17/21 1500   Wound Bed Granulation (%) - Post-Procedure 100 % 03/24/21 1500   Tunneling (cm) 0 cm 04/09/21 1200   Undermining (cm) 0 cm 04/09/21 1200   Shape irregular x2 04/19/21 1800   Wound Odor Mild 04/23/21 1200   Pulses Left;2+;DP;PT 04/19/21 1800   Exposed Structures None 04/23/21 1200   WOUND NURSE ONLY - Time Spent with Patient (mins) 60 04/23/21 1200     MEASUREMENTS:   Medial: 11.5 x 3 x 0.1  Lateral: 13.5 x 2 x 0.1     Lab Values:    Lab Results   Component Value Date/Time    WBC 7.1 04/13/2021 03:45 AM    RBC 4.20 (L) 04/13/2021 03:45 AM    HEMOGLOBIN 11.2 (L) 04/13/2021 03:45 AM    HEMATOCRIT 34.8 (L) 04/13/2021 03:45 AM    CREACTPROT 1.07 (H) 08/12/2020 01:55 PM    SEDRATEWES 85 (H) 08/07/2020 01:20 PM    HBA1C 5.7 (H) 11/09/2018 06:30 PM      Culture Results show:  Recent Results (from the past 720 hour(s))   CULTURE WOUND W/ GRAM STAIN    Collection Time: 09/01/20  2:00 PM    Specimen: Left Leg; Wound   Result Value Ref Range    Significant Indicator POS (POS)     Source WND     Site LEFT LEG     Culture Result - (A)     Gram Stain Result       Moderate Gram positive cocci.  Moderate Gram positive rods.      Culture Result  (A)      Beta Hemolytic Streptococcus group A  Moderate growth      Culture Result (A)      Methicillin Resistant Staphylococcus aureus  Moderate growth      Culture Result (A)      Diphtheroids  Moderate growth  Mixed morphologies.     KOSTAS: 9/20/21   Left.    Doppler waveforms of the common femoral artery are of high amplitude and    triphasic.    Doppler waveforms at the ankle are brisk and triphasic.    Ankle-brachial index is normal.    Unable to obtained popliteal waveforms due to wound bandages.     Self Report / Pain Level: Pre-medicated with PO oxycodone     INTERVENTIONS BY WOUND TEAM: INTERVENTIONS BY WOUND TEAM:  Performed standard wound care which includes appropriate positioning, dressing removal and non-selective debridement. Pictures and measurements obtained weekly if/when required.    Cleansed: Wound cleanser and gauze used to clean wound beds  Debridement: NA  Periwound: Cleansed with cleanser and gauze. Prepped with benzoin and paste rings..  Primary -  Half cut foam cut to shape of wound beds applied then secured with drape. TRAC pad applied to each wound bed and Y-sited. Suction at 125 mmHg   Secondary -  2 layer compression wrap applied.    Interdisciplinary consultation: Patient, Bedside RN    EVALUATION / RATIONALE FOR TREATMENT:    4/23/21: No agata applied during this dressing change. Wound beds with scant amount of biofilm, had mild odor. Continue NPWT and 2 layer compression. Can apply AGATA at next change.     4/21: agata applied today as no biologic was placed last time. Wounds remain stable.Was asked to check lateral leg for pressure injury. No evidence of pressure injury to medial or lateral leg.    4/19/2021 wounds decreasing in size, pink and granular tissue continue NPWT and two layer compression.   4/16/21:  Medial wound still decreasing in size, less progress on the lateral wound. Foul odor continues to be present, now concentrated in vac cannister, no other overt signs of  infection.  Consider continuing with agata with next vac change to encourage tissue granulation.   04/14/21:  No biologic to be applied this week due to approval issues.  No change in wound.   4/12/21: Medial wound appears to have decreased in size. Biologic being applied by LPS APRN weekly. Per LPS will reapply Biologic on Friday 4/16/21.   4/9: debridement and biologic placement performed by LPS APN. Adaptic to remain in place during next change.  4/7: Biologic to be reapplied this week by LPS with possible debridement if deemed appropriate.  Wounds remain stable and appear to be to surface level. Y connect on tracpad still in use.   4/4/21: Received call pt had pulled trac pad off. Whole dressing changed. Y connected wounds with 2 separate trac pads to each wound to decrease risk of loss of NPWT. Bridged lateral trac pad up along thigh to keep out of compressed area to ease replacement if pt pulls off and making it more difficult for pt to dislodge.   4/2/2021 - Debridement performed by APRN as patient's wounds continue to fail to contract. Biologic in place to bilateral wound beds and will be applied by RAFITA Goldberg again next Friday 4/11/2021. During next vac change, adaptic to remain in place as it is protecting the biologic underneath.   3/30/21: Wound odor mild-moderate and wound bed bright red, now 100% granulation tissue.  Meredith-skin continues to be fragile/flakey; protected with hydrocolloid dressing to wound border.    3/26/21: Foul odor present at dressing removal, but significantly decreased after cleansing.  Continue current POC.  3/24/21 Wound bed red, with less drainage noted.  Strong, foul odor still present.  APRN to see pt regarding wound bed. Continue with current plan of care.   3/22/21: Area unchanged from previous dressing change.  Wound malodorous.  Meredith-skin dry and flaky at superior wound border, hydocolloid thin dressing placed.   3/13/21 Wounds failing to contract.  Will trial  intermittent wound vac setting to encourage greater stimulation of cellular activity in wound bed.    3/10/2021: debrided wound, odor present, continue NPWT  3/6/12 area unchanged, odor persists  3/1/21: Lidocaine unavailable during this dressing change. Small amount of debridement completed with curette. Continued with silver powder for its antimicrobial properties.   2/27/21 skin bridge enlarging  2/24/2021: Excisional debridement by LPS APRN performed for rolled edges that were starting to form along the posterior and medial left leg wound. Moderate bleeding managed by administration of lidocaine injection. CSWD performed for slough. Wound bed is beefy red following debridement. Resume agata and silver foam to manage bioburden. Continue with CSWD with each dressing change.   2/22/2021: Rolled edges starting to form along posterior thigh wound bed - may require excisional debridement by provider. Minimal changes to actual wound bed. Continue CSWD, agata, and silver foam to manage bioburden.   2/19/21: minimal to no changes from last assessment. Continue CSWD, agata, and silver foam for bioburden.       Goals: Steady decrease in wound area and depth weekly.    NURSING PLAN OF CARE ORDERS (X):    Dressing changes: See Dressing Care orders: X  Skin care: See Skin Care orders:   Rectal tube care: See Rectal Tube Care orders:   Other orders:      WOUND TEAM PLAN OF CARE:   Dressing changes by wound team:        Follow up 3 times weekly:                NPWT change 3 times weekly:  X,  NPWT changes 3x/ weekly with 2 layer compression wrap.  Follow up 1-2 times weekly:      Follow up Bi-Monthly:                   Follow up as needed:       Other (explain):     Anticipated discharge plans:  LTACH:        SNF/Rehab:       Home Health Care:           Outpatient Wound Center:            Self Care:           Other- GH,

## 2021-04-23 NOTE — CARE PLAN
Problem: Safety  Goal: Will remain free from injury  Outcome: PROGRESSING AS EXPECTED  Note: Discussed patient mobility status with interdisciplinary team, fall precautions in place, pt fall prevention education done, pt educated to call for assistance when needed. One to one sitter in place  Goal: Will remain free from falls  Description: Pt mobilizes frequently independently.   Outcome: PROGRESSING AS EXPECTED     Problem: Bowel/Gastric:  Goal: Normal bowel function is maintained or improved  Outcome: PROGRESSING AS EXPECTED  Goal: Will not experience complications related to bowel motility  Outcome: PROGRESSING AS EXPECTED     Problem: Discharge Barriers/Planning  Goal: Patient's continuum of care needs will be met  Outcome: PROGRESSING AS EXPECTED  Note: Pending acceptance

## 2021-04-24 PROCEDURE — A9270 NON-COVERED ITEM OR SERVICE: HCPCS | Performed by: NURSE PRACTITIONER

## 2021-04-24 PROCEDURE — 700102 HCHG RX REV CODE 250 W/ 637 OVERRIDE(OP): Performed by: NURSE PRACTITIONER

## 2021-04-24 PROCEDURE — 99231 SBSQ HOSP IP/OBS SF/LOW 25: CPT | Performed by: NURSE PRACTITIONER

## 2021-04-24 PROCEDURE — 770001 HCHG ROOM/CARE - MED/SURG/GYN PRIV*

## 2021-04-24 RX ADMIN — DOCUSATE SODIUM 50 MG AND SENNOSIDES 8.6 MG 2 TABLET: 8.6; 5 TABLET, FILM COATED ORAL at 05:17

## 2021-04-24 RX ADMIN — GABAPENTIN 300 MG: 300 CAPSULE ORAL at 05:17

## 2021-04-24 RX ADMIN — GABAPENTIN 300 MG: 300 CAPSULE ORAL at 13:33

## 2021-04-24 RX ADMIN — RISPERIDONE 0.5 MG: 0.5 TABLET ORAL at 05:16

## 2021-04-24 RX ADMIN — GABAPENTIN 300 MG: 300 CAPSULE ORAL at 17:17

## 2021-04-24 RX ADMIN — DOCUSATE SODIUM 50 MG AND SENNOSIDES 8.6 MG 2 TABLET: 8.6; 5 TABLET, FILM COATED ORAL at 17:17

## 2021-04-24 RX ADMIN — CYCLOBENZAPRINE 10 MG: 10 TABLET, FILM COATED ORAL at 21:47

## 2021-04-24 RX ADMIN — DIVALPROEX SODIUM 125 MG: 125 CAPSULE ORAL at 21:41

## 2021-04-24 RX ADMIN — TRAZODONE HYDROCHLORIDE 50 MG: 100 TABLET ORAL at 21:41

## 2021-04-24 RX ADMIN — MORPHINE SULFATE 15 MG: 15 TABLET, FILM COATED, EXTENDED RELEASE ORAL at 05:17

## 2021-04-24 RX ADMIN — OXYCODONE 5 MG: 5 TABLET ORAL at 23:44

## 2021-04-24 RX ADMIN — RISPERIDONE 1 MG: 1 TABLET ORAL at 17:17

## 2021-04-24 RX ADMIN — METHOCARBAMOL TABLETS 500 MG: 500 TABLET, COATED ORAL at 21:41

## 2021-04-24 RX ADMIN — OXYCODONE 5 MG: 5 TABLET ORAL at 17:21

## 2021-04-24 RX ADMIN — METHOCARBAMOL TABLETS 500 MG: 500 TABLET, COATED ORAL at 05:16

## 2021-04-24 RX ADMIN — METHOCARBAMOL TABLETS 500 MG: 500 TABLET, COATED ORAL at 13:33

## 2021-04-24 RX ADMIN — DIVALPROEX SODIUM 125 MG: 125 CAPSULE ORAL at 13:33

## 2021-04-24 RX ADMIN — MORPHINE SULFATE 15 MG: 15 TABLET, FILM COATED, EXTENDED RELEASE ORAL at 17:17

## 2021-04-24 RX ADMIN — DIVALPROEX SODIUM 125 MG: 125 CAPSULE ORAL at 05:17

## 2021-04-24 RX ADMIN — METHOCARBAMOL TABLETS 500 MG: 500 TABLET, COATED ORAL at 17:17

## 2021-04-24 RX ADMIN — AMLODIPINE BESYLATE 5 MG: 5 TABLET ORAL at 05:16

## 2021-04-24 ASSESSMENT — ENCOUNTER SYMPTOMS
VOMITING: 0
MYALGIAS: 1
DEPRESSION: 0
CONSTIPATION: 0
MEMORY LOSS: 1
INSOMNIA: 1
DIZZINESS: 0
COUGH: 0
PALPITATIONS: 0
ABDOMINAL PAIN: 0
NERVOUS/ANXIOUS: 1
EYE REDNESS: 0
FEVER: 0
NAUSEA: 0
SORE THROAT: 0
SHORTNESS OF BREATH: 0
EYE PAIN: 0
WEAKNESS: 0
HEADACHES: 0
DIARRHEA: 0

## 2021-04-24 ASSESSMENT — PAIN DESCRIPTION - PAIN TYPE: TYPE: CHRONIC PAIN

## 2021-04-24 NOTE — CARE PLAN
Problem: Safety  Goal: Will remain free from injury  Outcome: PROGRESSING AS EXPECTED     Problem: Safety  Goal: Will remain free from falls  Description: Pt mobilizes frequently independently.   Outcome: PROGRESSING AS EXPECTED  Note: Patient will not experience fall during this shift     Problem: Bowel/Gastric:  Goal: Normal bowel function is maintained or improved  Outcome: PROGRESSING AS EXPECTED  Note: Patient will maintain normal bowel function over course of hospital stay     Problem: Infection  Goal: Will remain free from infection  Note: Patient will not show sign/symptoms of MAREN during course of hospitalization

## 2021-04-24 NOTE — PROGRESS NOTES
Assumed care of pt from day RN. Pt is sitting up in bed A&Ox3, disoriented to time. Pt c/o pain 6/10 in his left leg, pt medicated with PRN Oxy 5mg per MAR. Pt has a sitter at bedside for safety.

## 2021-04-24 NOTE — PROGRESS NOTES
Assumed care of patient this am; oriented to self and place. Patient denies SOB and pain at this time. Bed low and locked. Sitter at bedside. Will continue to monitor.

## 2021-04-24 NOTE — CARE PLAN
Problem: Safety  Goal: Will remain free from falls  Description: Pt mobilizes frequently independently.   Outcome: PROGRESSING AS EXPECTED  Note: Pt has some generalized weakness however ambulates with a steady gait. Pt has wound vac in place but is able to ambulate independently. Pt has a sitter at bedside for safety.      Problem: Discharge Barriers/Planning  Goal: Patient's continuum of care needs will be met  Outcome: PROGRESSING AS EXPECTED  Note: Pt has a legal guardian, JOAQUÍN working to find placement for pt at a  w/  and wound care.

## 2021-04-24 NOTE — PROGRESS NOTES
Hospital Medicine Twice Weekly Progress Note    Date of Service  4/24/2021    Chief Complaint  LLE wound infection    Hospital Course  Mr. Varela is a 65-year-old male with a PMHx of alcohol dependence, tobacco dependence, psychiatric disorder, and HTN who presented to the emergency department on 8/7/2020 with a left lower extremity wound infection. He was hospitalized at our facility last April and was evaluated by orthopedic surgery who recommended ongoing wound care. Wound cultures at that time grew MSSA and Streptococcus. He had been seen at [facility] earlier that week and prescribed antibiotics, but never filled the prescription.  An ultrasound of that extremity was done and was negative for DVT.  He was treated for sepsis and completed an antibiotic course on 9/16/2020. He was also found to have head lice and underwent treatment for that.  A repeat wound culture was done on 9/28/2020 and grew Strep A and Proteus. Infectious disease was consulted and recommended a 5-day course of IV zosyn which was completed on 10/5/2020. On 10/12/2020 the wound care team noticed increased inflammation to the proximal part of the wound bed and switched from a vera flow wound VAC to a regular wound VAC.  ID was again consulted and on 10/14/2020 recommended a 7-day course of antibiotics with meropenem which was completed on 10/22/2020. Additionally, he was noted to have intermittent agitation so was started on risperdal, depakote, and gabapentin per psychiatry recommendations.  On 11/20/2020 he underwent left lower extremity debridement with wound VAC placement. Since then he has remained stable but has been deemed incapacitated to make medical decisions so guardianship was pursued and granted on 1/29/21. Placement will likely be to a group home with home health services.     Interval Problem Update  4/20-patient up ambulating around room in no acute distress.  Wound VAC remains connected to left lower extremity with  serosanguineous/purulent drainage in canister.  Patient states some slight aching to left lower extremity although states it is nothing he can handle.  Patient in good mood conversing with provider, does not remember what hospital he is in but is happy that baseball is on TV.     4/24- Patient resting in bed in no acute distress. Wound vac in place and functio ing. Anticipate discharge on Monday. LPS re-evaluating wound. Sitter remains in place but patient has been generally more appropriate with less outbursts last few days.     Consultants/Specialty  Orthopedic surgery  Infectious disease  Psychiatry    Code Status  Full Code    Disposition  Guardianship established. Possible group home with home health on Monday      Review of Systems  Review of Systems   Constitutional: Negative for fever and malaise/fatigue.   HENT: Negative for congestion and sore throat.    Eyes: Negative for pain and redness.   Respiratory: Negative for cough and shortness of breath.    Cardiovascular: Positive for leg swelling (LLE). Negative for chest pain and palpitations.   Gastrointestinal: Negative for abdominal pain, constipation, diarrhea, nausea and vomiting.   Genitourinary: Negative for dysuria, frequency and urgency.   Musculoskeletal: Positive for myalgias (leg soreness ).   Neurological: Negative for dizziness, weakness and headaches.   Psychiatric/Behavioral: Positive for memory loss. Negative for depression. The patient is nervous/anxious and has insomnia.       Physical Exam  Temp:  [36.1 °C (96.9 °F)-36.2 °C (97.2 °F)] 36.1 °C (97 °F)  Pulse:  [70-77] 70  Resp:  [16-18] 16  BP: (104-116)/(65-79) 113/65  SpO2:  [93 %-98 %] 93 %    Physical Exam  Vitals and nursing note reviewed.   Constitutional:       General: He is awake.      Appearance: He is well-developed. He is ill-appearing (chronically ill appearing).   HENT:      Head: Normocephalic and atraumatic.      Mouth/Throat:      Lips: Pink.      Mouth: Mucous membranes  are moist.   Eyes:      General: Lids are normal.      Conjunctiva/sclera: Conjunctivae normal.      Comments: PERRL   Cardiovascular:      Rate and Rhythm: Normal rate.      Pulses: Normal pulses.      Heart sounds: Normal heart sounds.   Pulmonary:      Effort: Pulmonary effort is normal.      Breath sounds: Normal breath sounds.   Abdominal:      General: Bowel sounds are normal. There is no distension.      Palpations: Abdomen is soft.      Tenderness: There is no abdominal tenderness.   Musculoskeletal:      Cervical back: Neck supple.      Right lower leg: No edema.      Left lower leg: No edema.      Comments: L knee contracture   Skin:     General: Skin is warm and dry.          Neurological:      General: No focal deficit present.      Mental Status: He is alert. He is disoriented and confused.      Gait: Gait normal.   Psychiatric:         Attention and Perception: Attention normal.         Mood and Affect: Mood is anxious. Affect is labile.         Speech: Speech normal.         Behavior: Behavior is agitated and aggressive. Behavior is cooperative.         Thought Content: Thought content is delusional.         Cognition and Memory: Cognition is impaired. Memory is impaired. He exhibits impaired recent memory and impaired remote memory.         Judgment: Judgment is impulsive and inappropriate.       Exam unchanged from prior     Fluids    Intake/Output Summary (Last 24 hours) at 4/24/2021 1308  Last data filed at 4/24/2021 0804  Gross per 24 hour   Intake 840 ml   Output --   Net 840 ml     Laboratory    Imaging  IR-US GUIDED PIV   Final Result    Ultrasound-guided PERIPHERAL IV INSERTION performed by    qualified nursing staff as above.      MR-BRAIN-W/O   Final Result      MRI of the brain without contrast within normal limits for age with moderate atrophy and mild white matter changes.      [id number] VIEW   Final Result      No contraindication to MRI      DX-CHEST-LIMITED (1 VIEW)   Final Result       1.  Mild atelectasis      2.  No contraindication to MRI      DX-SKULL-LIMITED 3-   Final Result      Negative for metallic foreign body or contraindication to MRI      CT-HEAD W/O   Final Result      No acute intracranial hemorrhage is identified.      Mild white matter hypodensity is present.  This is a nonspecific finding which usually is found to represent chronic microvascular disease in patient's of this demographic.  Demyelination, age indeterminant ischemia and gliosis are also common    possibilities.      Mild parenchymal atrophy      DX-CHEST-LIMITED (1 VIEW)   Final Result      No evidence of acute cardiopulmonary process.      IR-US GUIDED PIV   Final Result    Ultrasound-guided PERIPHERAL IV INSERTION performed by    qualified nursing staff as above.      IR-MIDLINE CATHETER INSERTION WO GUIDANCE > AGE 3   Final Result                  Ultrasound-guided midline placement performed by qualified nursing staff    as above.          IR-US GUIDED PIV   Final Result    Ultrasound-guided PERIPHERAL IV INSERTION performed by    qualified nursing staff as above.      IR-MIDLINE CATHETER INSERTION WO GUIDANCE > AGE 3   Final Result                  Ultrasound-guided midline placement performed by qualified nursing staff    as above.          US-KOSTAS SINGLE LEVEL BILAT   Final Result      CT-HEAD W/O   Final Result      No acute intracranial abnormality      DX-TIBIA AND FIBULA LEFT   Final Result      1.  Healed distal metaphyseal fractures of the left fibula and tibia with tibial IM layla in place.      2.  No acute fracture or dislocation.      3.  No radiopaque soft tissue foreign body.      DX-FEMUR-2+ LEFT   Final Result      1.  No radiographic evidence of acute traumatic injury left femur.      2.  Unchanged cortical thickening in the posterior aspect of the distal femoral diaphysis which may represent sequela of prior injury or infection.      3.  No soft tissue foreign body identified.       US-EXTREMITY VENOUS LOWER UNILAT LEFT   Final Result      DX-CHEST-PORTABLE (1 VIEW)   Final Result      No acute cardiopulmonary abnormality.         Assessment/Plan  * Wound of left leg- (present on admission)  Assessment & Plan  -Wound vac to LLE. Wound care following, appreciate assistance.   -Pain controlled, continue med regimen as currently ordered.   -Monitor for evidence of infection.   -Home health and home Wound VAC to be placed   -Possible discharge on Monday     Encephalopathy, unspecified- (present on admission)  Assessment & Plan  -intermittently symptomatic.  Somewhat confused,  agitated.  Poor memory.    -Labs within normal limits. Clinical picture does not indicate infection.  -Stat CT head obtained and was negative.  -MRI within normal limits  -Depakote level low.  -No recent changes to medications.    Psychiatric disorder- (present on admission)  Assessment & Plan  -Unspecified.  -Continue Risperdal and Depakote.   -Psychiatry was consulted and deemed him incapacitated to leave AMA or make medical decisions.  -SLP repeated cognitive evaluation in Jan 2021- continues to recommend 24/7 supervision.   -Guardianship established  -Group home to evaluate per CM 4/19    Essential hypertension- (present on admission)  Assessment & Plan  -Well controlled on current regimen.   -Continue amlodipine.     Emphysema/COPD (HCC)- (present on admission)  Assessment & Plan  -Chronic and stable off medication.  -No acute exacerbation.     Protein malnutrition (HCC)- (present on admission)  Assessment & Plan  -Body mass index is 21.53 kg/m².-Slightly improving.   -Continue TID supplements.   -Encourage PO intake.    Flexion contracture of knee, 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)  Assessment & Plan  -Secondary to chronic wound in that area.  -Continue PT/OT.  -Does not seem to limit his mobility. Is frequently ambulatory.  -Continue PRN flexeril. Robaxin added 3/14/2021.     VTE prophylaxis: Ambulatory

## 2021-04-25 PROCEDURE — 700102 HCHG RX REV CODE 250 W/ 637 OVERRIDE(OP): Performed by: NURSE PRACTITIONER

## 2021-04-25 PROCEDURE — A9270 NON-COVERED ITEM OR SERVICE: HCPCS | Performed by: NURSE PRACTITIONER

## 2021-04-25 PROCEDURE — 770001 HCHG ROOM/CARE - MED/SURG/GYN PRIV*

## 2021-04-25 RX ADMIN — METHOCARBAMOL TABLETS 500 MG: 500 TABLET, COATED ORAL at 21:27

## 2021-04-25 RX ADMIN — TRAZODONE HYDROCHLORIDE 50 MG: 100 TABLET ORAL at 21:26

## 2021-04-25 RX ADMIN — GABAPENTIN 300 MG: 300 CAPSULE ORAL at 17:04

## 2021-04-25 RX ADMIN — RISPERIDONE 1 MG: 1 TABLET ORAL at 17:04

## 2021-04-25 RX ADMIN — RISPERIDONE 0.5 MG: 0.5 TABLET ORAL at 07:59

## 2021-04-25 RX ADMIN — DIVALPROEX SODIUM 125 MG: 125 CAPSULE ORAL at 07:58

## 2021-04-25 RX ADMIN — MORPHINE SULFATE 15 MG: 15 TABLET, FILM COATED, EXTENDED RELEASE ORAL at 07:58

## 2021-04-25 RX ADMIN — GABAPENTIN 300 MG: 300 CAPSULE ORAL at 07:58

## 2021-04-25 RX ADMIN — METHOCARBAMOL TABLETS 500 MG: 500 TABLET, COATED ORAL at 11:42

## 2021-04-25 RX ADMIN — METHOCARBAMOL TABLETS 500 MG: 500 TABLET, COATED ORAL at 17:05

## 2021-04-25 RX ADMIN — GABAPENTIN 300 MG: 300 CAPSULE ORAL at 11:42

## 2021-04-25 RX ADMIN — OXYCODONE 5 MG: 5 TABLET ORAL at 12:59

## 2021-04-25 RX ADMIN — DIVALPROEX SODIUM 125 MG: 125 CAPSULE ORAL at 13:46

## 2021-04-25 RX ADMIN — OXYCODONE 5 MG: 5 TABLET ORAL at 21:26

## 2021-04-25 RX ADMIN — DOCUSATE SODIUM 50 MG AND SENNOSIDES 8.6 MG 2 TABLET: 8.6; 5 TABLET, FILM COATED ORAL at 07:58

## 2021-04-25 RX ADMIN — AMLODIPINE BESYLATE 5 MG: 5 TABLET ORAL at 07:59

## 2021-04-25 RX ADMIN — METHOCARBAMOL TABLETS 500 MG: 500 TABLET, COATED ORAL at 07:59

## 2021-04-25 RX ADMIN — MORPHINE SULFATE 15 MG: 15 TABLET, FILM COATED, EXTENDED RELEASE ORAL at 17:05

## 2021-04-25 RX ADMIN — DIVALPROEX SODIUM 125 MG: 125 CAPSULE ORAL at 22:00

## 2021-04-25 RX ADMIN — DOCUSATE SODIUM 50 MG AND SENNOSIDES 8.6 MG 2 TABLET: 8.6; 5 TABLET, FILM COATED ORAL at 17:04

## 2021-04-25 ASSESSMENT — PAIN DESCRIPTION - PAIN TYPE
TYPE: CHRONIC PAIN
TYPE: ACUTE PAIN
TYPE: CHRONIC PAIN

## 2021-04-25 NOTE — PROGRESS NOTES
Assumed care of patient this am; oriented to self and place. Patient denies SOB and pain at this time. Bed low and locked. Will continue to monitor.

## 2021-04-26 ENCOUNTER — PATIENT OUTREACH (OUTPATIENT)
Dept: HEALTH INFORMATION MANAGEMENT | Facility: OTHER | Age: 66
End: 2021-04-26

## 2021-04-26 PROCEDURE — A9270 NON-COVERED ITEM OR SERVICE: HCPCS | Performed by: NURSE PRACTITIONER

## 2021-04-26 PROCEDURE — 700102 HCHG RX REV CODE 250 W/ 637 OVERRIDE(OP): Performed by: NURSE PRACTITIONER

## 2021-04-26 PROCEDURE — 302098 PASTE RING (FLAT): Performed by: NURSE PRACTITIONER

## 2021-04-26 PROCEDURE — 97606 NEG PRS WND THER DME>50 SQCM: CPT

## 2021-04-26 PROCEDURE — 770001 HCHG ROOM/CARE - MED/SURG/GYN PRIV*

## 2021-04-26 RX ORDER — TEMAZEPAM 7.5 MG/1
7.5 CAPSULE ORAL ONCE
Status: COMPLETED | OUTPATIENT
Start: 2021-04-26 | End: 2021-04-26

## 2021-04-26 RX ADMIN — DOCUSATE SODIUM 50 MG AND SENNOSIDES 8.6 MG 2 TABLET: 8.6; 5 TABLET, FILM COATED ORAL at 17:36

## 2021-04-26 RX ADMIN — GABAPENTIN 300 MG: 300 CAPSULE ORAL at 13:24

## 2021-04-26 RX ADMIN — METHOCARBAMOL TABLETS 500 MG: 500 TABLET, COATED ORAL at 17:36

## 2021-04-26 RX ADMIN — DIVALPROEX SODIUM 125 MG: 125 CAPSULE ORAL at 13:24

## 2021-04-26 RX ADMIN — CYCLOBENZAPRINE 10 MG: 10 TABLET, FILM COATED ORAL at 20:41

## 2021-04-26 RX ADMIN — MORPHINE SULFATE 15 MG: 15 TABLET, FILM COATED, EXTENDED RELEASE ORAL at 06:25

## 2021-04-26 RX ADMIN — RISPERIDONE 1 MG: 1 TABLET ORAL at 17:36

## 2021-04-26 RX ADMIN — AMLODIPINE BESYLATE 5 MG: 5 TABLET ORAL at 06:25

## 2021-04-26 RX ADMIN — CYCLOBENZAPRINE 10 MG: 10 TABLET, FILM COATED ORAL at 00:22

## 2021-04-26 RX ADMIN — GABAPENTIN 300 MG: 300 CAPSULE ORAL at 06:25

## 2021-04-26 RX ADMIN — HYDROXYZINE HYDROCHLORIDE 25 MG: 50 TABLET, FILM COATED ORAL at 20:41

## 2021-04-26 RX ADMIN — METHOCARBAMOL TABLETS 500 MG: 500 TABLET, COATED ORAL at 13:24

## 2021-04-26 RX ADMIN — MORPHINE SULFATE 15 MG: 15 TABLET, FILM COATED, EXTENDED RELEASE ORAL at 17:36

## 2021-04-26 RX ADMIN — METHOCARBAMOL TABLETS 500 MG: 500 TABLET, COATED ORAL at 06:25

## 2021-04-26 RX ADMIN — GABAPENTIN 300 MG: 300 CAPSULE ORAL at 17:36

## 2021-04-26 RX ADMIN — DIVALPROEX SODIUM 125 MG: 125 CAPSULE ORAL at 06:25

## 2021-04-26 RX ADMIN — RISPERIDONE 0.5 MG: 0.5 TABLET ORAL at 06:25

## 2021-04-26 RX ADMIN — METHOCARBAMOL TABLETS 500 MG: 500 TABLET, COATED ORAL at 20:41

## 2021-04-26 RX ADMIN — DIVALPROEX SODIUM 125 MG: 125 CAPSULE ORAL at 20:41

## 2021-04-26 RX ADMIN — TRAZODONE HYDROCHLORIDE 50 MG: 100 TABLET ORAL at 20:41

## 2021-04-26 NOTE — WOUND TEAM
[facility]  Inpatient Services  Wound and Skin Care Progress Note    Admission Date: 8/7/2020     Last order of IP CONSULT TO WOUND CARE was found on 9/1/2020 from Hospital Encounter on 8/7/2020     HPI, PMH, SH: Reviewed    Unit where seen by Wound Team: [id number]    WOUND CONSULT/FOLLOW UP RELATED TO:  Left leg scheduled negative pressure wound therapy (npwt) dressing change and 2 layer compression wrap change.    WOUND TYPE, LOCATION, CHARACTERISTICS (Pressure Injuries: location, stage, POA or date identified)      Negative Pressure Wound Therapy 11/20/20 Leg Lateral;Posterior;Medial Left (Active)   Vacuum Serial Number [id number] 04/22/21 0749   NPWT Pump Mode / Pressure Setting Ulta;Continuous;125 mmHg 04/26/21 1500   Dressing Type Medium;Black Foam (Regular) 04/26/21 1500   Number of Foam Pieces Used 3 04/26/21 1500   Canister Changed No 04/26/21 1500   Output (mL) 500 mL 04/22/21 1300   NEXT Dressing Change/Treatment Date 04/28/21 04/26/21 1500   WOUND NURSE ONLY - Time Spent with Patient (mins) 60 04/26/21 1500           Wound 11/19/20 Full Thickness Wound Leg Lateral;Posterior;Medial Left (Active)   Wound Image    04/23/21 1200   Site Assessment Red;[place] 04/26/21 1500   Periwound Assessment Clean;Dry;Intact;Scar tissue 04/26/21 1500   Margins Attached edges;Defined edges 04/26/21 1500   Closure Secondary intention 04/26/21 1500   Drainage Amount Small 04/26/21 1500   Drainage Description Serosanguineous 04/26/21 1500   Treatments Cleansed;Irrigation;Site care;Offloading;CSWD - Conservative Sharp Wound Debridement 04/26/21 1500   Wound Cleansing Approved Wound Cleanser 04/26/21 1500   Periwound Protectant Benzoin;Paste Ring;Drape 04/26/21 1500   Dressing Cleansing/Solutions Not Applicable 04/26/21 1500   Dressing Options Collagen Dressing;Wound Vac;Compression Wrap Two Layer 04/26/21 1500   Dressing Changed Changed 04/26/21 1500   Dressing Status Clean;Dry;Intact 04/26/21 1500   Dressing  Change/Treatment Frequency Monday, Wednesday, Friday, and As Needed 04/26/21 1500   NEXT Dressing Change/Treatment Date 04/28/21 04/26/21 1500   NEXT Weekly Photo (Inpatient Only) 04/30/21 04/26/21 1500   Non-staged Wound Description Full thickness 04/26/21 1500   Wound Length (cm) 12.6 cm 02/22/21 0830   Wound Width (cm) 3 cm 02/22/21 0830   Wound Depth (cm) 0.1 cm 04/16/21 0914   Wound Surface Area (cm^2) 37.8 cm^2 02/22/21 0830   Wound Volume (cm^3) 11.34 cm^3 02/22/21 0830   Post-Procedure Length (cm) 15 cm 02/03/21 1100   Post-Procedure Width (cm) 3.4 cm 02/03/21 1100   Post-Procedure Depth (cm) 0.2 cm 02/03/21 1100   Post-Procedure Surface Area (cm^2) 51 cm^2 02/03/21 1100   Post-Procedure Volume (cm^3) 10.2 cm^3 02/03/21 1100   Wound Healing % -11 02/22/21 0830   Wound Bed Granulation (%) 100 % 03/30/21 0900   Wound Bed Slough (%) 10 % 03/17/21 1500   Wound Bed Granulation (%) - Post-Procedure 100 % 03/24/21 1500   Tunneling (cm) 0 cm 04/09/21 1200   Undermining (cm) 0 cm 04/09/21 1200   Shape irregular x2 04/19/21 1800   Wound Odor Mild 04/26/21 1500   Pulses Left;2+;DP;PT 04/26/21 1500   Exposed Structures None 04/26/21 1500   WOUND NURSE ONLY - Time Spent with Patient (mins) 60 04/26/21 1500            MEASUREMENTS:   Medial: 11.5 x 3 x 0.1  Lateral: 13.5 x 2 x 0.1     Lab Values:    Lab Results   Component Value Date/Time    WBC 7.1 04/13/2021 03:45 AM    RBC 4.20 (L) 04/13/2021 03:45 AM    HEMOGLOBIN 11.2 (L) 04/13/2021 03:45 AM    HEMATOCRIT 34.8 (L) 04/13/2021 03:45 AM    CREACTPROT 1.07 (H) 08/12/2020 01:55 PM    SEDRATEWES 85 (H) 08/07/2020 01:20 PM    HBA1C 5.7 (H) 11/09/2018 06:30 PM      Culture Results show:  Recent Results (from the past 720 hour(s))   CULTURE WOUND W/ GRAM STAIN    Collection Time: 09/01/20  2:00 PM    Specimen: Left Leg; Wound   Result Value Ref Range    Significant Indicator POS (POS)     Source WND     Site LEFT LEG     Culture Result - (A)     Gram Stain Result        Moderate Gram positive cocci.  Moderate Gram positive rods.      Culture Result (A)      Beta Hemolytic Streptococcus group A  Moderate growth      Culture Result (A)      Methicillin Resistant Staphylococcus aureus  Moderate growth      Culture Result (A)      Diphtheroids  Moderate growth  Mixed morphologies.     KOSTAS: 9/20/21   Left.    Doppler waveforms of the common femoral artery are of high amplitude and    triphasic.    Doppler waveforms at the ankle are brisk and triphasic.    Ankle-brachial index is normal.    Unable to obtained popliteal waveforms due to wound bandages.     Self Report / Pain Level: Pre-medicated with PO oxycodone     INTERVENTIONS BY WOUND TEAM: INTERVENTIONS BY WOUND TEAM:  Performed standard wound care which includes appropriate positioning, dressing removal and non-selective debridement. Pictures and measurements obtained weekly if/when required.    Cleansed: Wound cleanser and gauze used to clean wound beds  Debridement: NA  Periwound: Cleansed with cleanser and gauze. Prepped with benzoin and paste rings..  Primary -  Collagen, half cut foam cut to shape of wound beds applied then secured with drape. TRAC pad applied to each wound bed and Y-sited. Suction at 150mmHg   Secondary -  2 layer compression wrap applied. mepilex to secure tubing    Interdisciplinary consultation: Patient, Bedside RN    EVALUATION / RATIONALE FOR TREATMENT:    4/26/21: Collagen restarted as wound bed debrided by this wound care RN.  Patient pending discharge within this week.  Outpatient wound care referral in place for outpatient wound care and home health to change wound VAC.  Possible to restart biologic placement to left LE medial and posterior lateral wounds to expedite healing and closure of wounds.   4/23/21: No agata applied during this dressing change. Wound beds with scant amount of biofilm, had mild odor. Continue NPWT and 2 layer compression. Can apply AGATA at next change.   4/21: agata  applied today as no biologic was placed last time. Wounds remain stable.Was asked to check lateral leg for pressure injury. No evidence of pressure injury to medial or lateral leg.  4/19/2021 wounds decreasing in size, pink and granular tissue continue NPWT and two layer compression.   4/16/21:  Medial wound still decreasing in size, less progress on the lateral wound. Foul odor continues to be present, now concentrated in vac cannister, no other overt signs of infection.  Consider continuing with agata with next vac change to encourage tissue granulation.   04/14/21:  No biologic to be applied this week due to approval issues.  No change in wound.   4/12/21: Medial wound appears to have decreased in size. Biologic being applied by LPS APRN weekly. Per LPS will reapply Biologic on Friday 4/16/21.   4/9: debridement and biologic placement performed by LPS APN. Adaptic to remain in place during next change.  4/7: Biologic to be reapplied this week by LPS with possible debridement if deemed appropriate.  Wounds remain stable and appear to be to surface level. Y connect on tracpad still in use.   4/4/21: Received call pt had pulled trac pad off. Whole dressing changed. Y connected wounds with 2 separate trac pads to each wound to decrease risk of loss of NPWT. Bridged lateral trac pad up along thigh to keep out of compressed area to ease replacement if pt pulls off and making it more difficult for pt to dislodge.   4/2/2021 - Debridement performed by APRN as patient's wounds continue to fail to contract. Biologic in place to bilateral wound beds and will be applied by RAFITA Goldberg again next Friday 4/11/2021. During next vac change, adaptic to remain in place as it is protecting the biologic underneath.   3/30/21: Wound odor mild-moderate and wound bed bright red, now 100% granulation tissue.  Meredith-skin continues to be fragile/flakey; protected with hydrocolloid dressing to wound border.    3/26/21: Foul odor  present at dressing removal, but significantly decreased after cleansing.  Continue current POC.  3/24/21 Wound bed red, with less drainage noted.  Strong, foul odor still present.  APRN to see pt regarding wound bed. Continue with current plan of care.   3/22/21: Area unchanged from previous dressing change.  Wound malodorous.  Meredith-skin dry and flaky at superior wound border, hydocolloid thin dressing placed.   3/13/21 Wounds failing to contract.  Will trial intermittent wound vac setting to encourage greater stimulation of cellular activity in wound bed.    3/10/2021: debrided wound, odor present, continue NPWT  3/6/12 area unchanged, odor persists  3/1/21: Lidocaine unavailable during this dressing change. Small amount of debridement completed with curette. Continued with silver powder for its antimicrobial properties.   2/27/21 skin bridge enlarging  2/24/2021: Excisional debridement by LPS APRN performed for rolled edges that were starting to form along the posterior and medial left leg wound. Moderate bleeding managed by administration of lidocaine injection. CSWD performed for slough. Wound bed is beefy red following debridement. Resume agata and silver foam to manage bioburden. Continue with CSWD with each dressing change.   2/22/2021: Rolled edges starting to form along posterior thigh wound bed - may require excisional debridement by provider. Minimal changes to actual wound bed. Continue CSWD, agata, and silver foam to manage bioburden.   2/19/21: minimal to no changes from last assessment. Continue CSWD, agata, and silver foam for bioburden.       Goals: Steady decrease in wound area and depth weekly.    NURSING PLAN OF CARE ORDERS (X):    Dressing changes: See Dressing Care orders: X  Skin care: See Skin Care orders:   Rectal tube care: See Rectal Tube Care orders:   Other orders:      WOUND TEAM PLAN OF CARE:   Dressing changes by wound team:        Follow up 3 times weekly:                NPWT  change 3 times weekly:  X,  NPWT changes 3x/ weekly with 2 layer compression wrap.  Follow up 1-2 times weekly:      Follow up Bi-Monthly:                   Follow up as needed:       Other (explain):     Anticipated discharge plans:    LTACH:        SNF/Rehab:       Home Health Care:           Outpatient Wound Center:            Self Care:           Other- [facility], [facility] with home health and outpatient wound care for biologic placement.

## 2021-04-26 NOTE — PROGRESS NOTES
Assumed care of patient this am; oriented to self and place. Patient denies SOB, but endorses a 3/10 pain in the left leg. Bed low and locked. Discussed discharge plan and safety awareness with patient. Will continue to monitor.

## 2021-04-26 NOTE — CARE PLAN
Problem: Safety  Goal: Will remain free from falls  Description: Pt mobilizes frequently independently.   Outcome: PROGRESSING AS EXPECTED  Note: Patient will not experience fall during this shift     Problem: Infection  Goal: Will remain free from infection  Outcome: PROGRESSING AS EXPECTED  Note: Patient will not show sign/symptoms of MAREN during course of hospitalization      Problem: Skin Integrity  Goal: Risk for impaired skin integrity will decrease  Outcome: PROGRESSING AS EXPECTED  Note: Patient will not experience deterioration of skin during hospital stay

## 2021-04-26 NOTE — DISCHARGE PLANNING
Anticipated Discharge Disposition:   Group Home with [facility], with home health, wound wac    Action:   EKATERINA YANES called Anupama from [facility]. Per Anupama, wound vac was delivered last Friday. Bedside RN confirmed wound vac at bedside.    EKATERINA YANES called Lakia from [place]. Per Lakia, she is still waiting for [facility] to confirm acceptance and appointment to establish PCP. [facility] This RN JOAQUÍN also reached out to [facility] and email was sent to [facility] to follow up. RN CM emailed pt's Legal Guardian, Aletha Miguel A, for updates.    Signed JAI emailed to  Admin Peterson and Legal Guardian.    Barriers to Discharge:   Home health acceptance, [facility] acceptance to establish PCP.    Plan:   Hospital Care Management will continue to follow and assist with discharge planning needs.

## 2021-04-27 PROCEDURE — A9270 NON-COVERED ITEM OR SERVICE: HCPCS | Performed by: NURSE PRACTITIONER

## 2021-04-27 PROCEDURE — 700102 HCHG RX REV CODE 250 W/ 637 OVERRIDE(OP): Performed by: NURSE PRACTITIONER

## 2021-04-27 PROCEDURE — 770001 HCHG ROOM/CARE - MED/SURG/GYN PRIV*

## 2021-04-27 RX ADMIN — DIVALPROEX SODIUM 125 MG: 125 CAPSULE ORAL at 22:42

## 2021-04-27 RX ADMIN — DIVALPROEX SODIUM 125 MG: 125 CAPSULE ORAL at 07:30

## 2021-04-27 RX ADMIN — METHOCARBAMOL TABLETS 500 MG: 500 TABLET, COATED ORAL at 22:42

## 2021-04-27 RX ADMIN — MORPHINE SULFATE 15 MG: 15 TABLET, FILM COATED, EXTENDED RELEASE ORAL at 07:30

## 2021-04-27 RX ADMIN — AMLODIPINE BESYLATE 5 MG: 5 TABLET ORAL at 07:30

## 2021-04-27 RX ADMIN — DOCUSATE SODIUM 50 MG AND SENNOSIDES 8.6 MG 2 TABLET: 8.6; 5 TABLET, FILM COATED ORAL at 17:25

## 2021-04-27 RX ADMIN — GABAPENTIN 300 MG: 300 CAPSULE ORAL at 17:25

## 2021-04-27 RX ADMIN — METHOCARBAMOL TABLETS 500 MG: 500 TABLET, COATED ORAL at 07:30

## 2021-04-27 RX ADMIN — DIVALPROEX SODIUM 125 MG: 125 CAPSULE ORAL at 13:41

## 2021-04-27 RX ADMIN — METHOCARBAMOL TABLETS 500 MG: 500 TABLET, COATED ORAL at 13:41

## 2021-04-27 RX ADMIN — GABAPENTIN 300 MG: 300 CAPSULE ORAL at 07:30

## 2021-04-27 RX ADMIN — OXYCODONE 5 MG: 5 TABLET ORAL at 13:45

## 2021-04-27 RX ADMIN — GABAPENTIN 300 MG: 300 CAPSULE ORAL at 13:41

## 2021-04-27 RX ADMIN — MORPHINE SULFATE 15 MG: 15 TABLET, FILM COATED, EXTENDED RELEASE ORAL at 17:25

## 2021-04-27 RX ADMIN — RISPERIDONE 1 MG: 1 TABLET ORAL at 17:25

## 2021-04-27 RX ADMIN — METHOCARBAMOL TABLETS 500 MG: 500 TABLET, COATED ORAL at 17:25

## 2021-04-27 RX ADMIN — RISPERIDONE 0.5 MG: 0.5 TABLET ORAL at 07:30

## 2021-04-27 RX ADMIN — OXYCODONE 5 MG: 5 TABLET ORAL at 19:43

## 2021-04-27 RX ADMIN — TRAZODONE HYDROCHLORIDE 50 MG: 100 TABLET ORAL at 22:42

## 2021-04-27 ASSESSMENT — PAIN DESCRIPTION - PAIN TYPE
TYPE: ACUTE PAIN

## 2021-04-27 NOTE — DISCHARGE PLANNING
Anticipated Discharge Disposition:   Group Home with [facility], with home health, wound wac    Action:   RN CM received email from pt's Legal Guardian, Aletha Grady, that she resubmitted documents to [facility] to complete pt's enrollment for PCP. Per Aletha, possible time frame to finish enrollment is today or first thing tomorrow.    Barriers to Discharge:   Home health acceptance, [facility] acceptance to establish PCP.    Plan:   Follow up with Legal Guardian, [facility], HH.  Hospital Care Management will continue to follow and assist with discharge planning needs.

## 2021-04-27 NOTE — CARE PLAN
Problem: Safety  Goal: Will remain free from falls  Description: Pt mobilizes frequently independently.   Outcome: PROGRESSING AS EXPECTED  Note: Patient will not experience fall during this shift     Problem: Infection  Goal: Will remain free from infection  Outcome: PROGRESSING AS EXPECTED  Note: Patient will not show sign/symptoms of MAREN during course of hospitalization      Problem: Venous Thromboembolism (VTW)/Deep Vein Thrombosis (DVT) Prevention:  Goal: Patient will participate in Venous Thrombosis (VTE)/Deep Vein Thrombosis (DVT)Prevention Measures  Outcome: PROGRESSING AS EXPECTED     Problem: Bowel/Gastric:  Goal: Normal bowel function is maintained or improved  Outcome: PROGRESSING AS EXPECTED  Note: Patient will maintain normal bowel function over course of hospital stay

## 2021-04-27 NOTE — PROGRESS NOTES
Patient with tele sitter in place as patient is impulsive at times. Will continue to reorient patient as needed and monitor patient for elopement risk. Patient agitated at times however able to be calmed down with reassurance.

## 2021-04-27 NOTE — DISCHARGE PLANNING
Anticipated Discharge Disposition:   Group Home with home health, wound vac    Action:   RN JOAQUÍN received call from [facility]. Their earlier available appointment is on Friday. Informed [facility] that pt will be discharging to a group home with wound vac, HH, and pt has a Legal Guardian. [facility] to contact pt's Legal Guardian, Aletha Dorman from [facility] and Aletha updated via email.    Signed JAI emailed to  Admin Peterson and Legal Guardian.    Barriers to Discharge:   Home health acceptance, [facility] acceptance and appointment to establish PCP.     Plan:   Follow up with , Legal Guardian.  Hospital Care Management will continue to follow and assist with discharge planning needs.

## 2021-04-28 PROCEDURE — 700102 HCHG RX REV CODE 250 W/ 637 OVERRIDE(OP): Performed by: NURSE PRACTITIONER

## 2021-04-28 PROCEDURE — A9270 NON-COVERED ITEM OR SERVICE: HCPCS | Performed by: NURSE PRACTITIONER

## 2021-04-28 PROCEDURE — 770001 HCHG ROOM/CARE - MED/SURG/GYN PRIV*

## 2021-04-28 PROCEDURE — 302009 SKINTEGRITY WOUND CLEANSER: Performed by: NURSE PRACTITIONER

## 2021-04-28 PROCEDURE — 302098 PASTE RING (FLAT): Performed by: NURSE PRACTITIONER

## 2021-04-28 PROCEDURE — 97606 NEG PRS WND THER DME>50 SQCM: CPT

## 2021-04-28 RX ADMIN — MORPHINE SULFATE 15 MG: 15 TABLET, FILM COATED, EXTENDED RELEASE ORAL at 16:49

## 2021-04-28 RX ADMIN — GABAPENTIN 300 MG: 300 CAPSULE ORAL at 16:49

## 2021-04-28 RX ADMIN — AMLODIPINE BESYLATE 5 MG: 5 TABLET ORAL at 06:08

## 2021-04-28 RX ADMIN — RISPERIDONE 0.5 MG: 0.5 TABLET ORAL at 06:08

## 2021-04-28 RX ADMIN — OXYCODONE 5 MG: 5 TABLET ORAL at 19:36

## 2021-04-28 RX ADMIN — METHOCARBAMOL TABLETS 500 MG: 500 TABLET, COATED ORAL at 16:49

## 2021-04-28 RX ADMIN — DIVALPROEX SODIUM 125 MG: 125 CAPSULE ORAL at 19:32

## 2021-04-28 RX ADMIN — DIVALPROEX SODIUM 125 MG: 125 CAPSULE ORAL at 06:08

## 2021-04-28 RX ADMIN — TRAZODONE HYDROCHLORIDE 50 MG: 100 TABLET ORAL at 19:32

## 2021-04-28 RX ADMIN — GABAPENTIN 300 MG: 300 CAPSULE ORAL at 12:11

## 2021-04-28 RX ADMIN — GABAPENTIN 300 MG: 300 CAPSULE ORAL at 06:08

## 2021-04-28 RX ADMIN — METHOCARBAMOL TABLETS 500 MG: 500 TABLET, COATED ORAL at 12:11

## 2021-04-28 RX ADMIN — RISPERIDONE 1 MG: 1 TABLET ORAL at 16:49

## 2021-04-28 RX ADMIN — METHOCARBAMOL TABLETS 500 MG: 500 TABLET, COATED ORAL at 19:32

## 2021-04-28 RX ADMIN — DIVALPROEX SODIUM 125 MG: 125 CAPSULE ORAL at 13:28

## 2021-04-28 RX ADMIN — MORPHINE SULFATE 15 MG: 15 TABLET, FILM COATED, EXTENDED RELEASE ORAL at 06:08

## 2021-04-28 RX ADMIN — METHOCARBAMOL TABLETS 500 MG: 500 TABLET, COATED ORAL at 06:08

## 2021-04-28 ASSESSMENT — PAIN DESCRIPTION - PAIN TYPE: TYPE: ACUTE PAIN

## 2021-04-28 NOTE — CARE PLAN
Problem: Safety  Goal: Will remain free from injury  Outcome: PROGRESSING AS EXPECTED  Pt will demonstrate appropriate use of safety devices     Problem: Pain Management  Goal: Pain level will decrease to patient's comfort goal  Outcome: PROGRESSING AS EXPECTED   Implement use of pain rating scale; Medicate as appropriate

## 2021-04-28 NOTE — DISCHARGE PLANNING
Anticipated Discharge Disposition:   Group Home with home health, wound vac    Action:   RN CM received voicemail from pt's Legal Guardian. Aletha Grady. Per Aletha, [facility] accepted pt and scheduled Dr. Shannon to see pt on Tuesday next week between 0900 to 1200.    Per Aletha, she will call the group home to see what other requirements they need and if they can provide transportation. Per Aletha, plan is for pt to discharge on Monday, [facility] PCP to see pt on Tuesday, and [facility] to see pt on Wednesday.     EKATERINA YANES called Lakia from [facility] for updates and if HH can visit pt on Wednesday. No answer, left voicemail for call back.    [employer] wound vac was delivered at bedside. Signed JAI emailed to GH and Legal Guardian.     Barriers to Discharge:   [facility] visit.    Plan:   Follow up with DPA.  Hospital Care Management will continue to follow and assist with discharge planning needs.

## 2021-04-28 NOTE — WOUND TEAM
[facility]  Inpatient Services  Wound and Skin Care Progress Note    Admission Date: 8/7/2020     Last order of IP CONSULT TO WOUND CARE was found on 9/1/2020 from Hospital Encounter on 8/7/2020     HPI, PMH, SH: Reviewed    Unit where seen by Wound Team: [id number]    WOUND CONSULT/FOLLOW UP RELATED TO:  Left leg scheduled negative pressure wound therapy (npwt) dressing change and 2 layer compression wrap change.    WOUND TYPE, LOCATION, CHARACTERISTICS (Pressure Injuries: location, stage, POA or date identified)      Negative Pressure Wound Therapy 11/20/20 Leg Lateral;Posterior;Medial Left (Active)   Vacuum Serial Number [id number] 04/22/21 0749   NPWT Pump Mode / Pressure Setting [place];Continuous;150 mmHg 04/28/21 1500   Dressing Type Medium;Black Foam (Regular) 04/28/21 1500   Number of Foam Pieces Used 2 04/28/21 1500   Canister Changed No 04/28/21 1500   Output (mL) 500 mL 04/22/21 1300   NEXT Dressing Change/Treatment Date 04/30/21 04/28/21 1500   WOUND NURSE ONLY - Time Spent with Patient (mins) 60 04/28/21 1500           Wound 11/19/20 Full Thickness Wound Leg Lateral;Posterior;Medial Left (Active)   Wound Image    04/23/21 1200   Site Assessment Red;[place] 04/28/21 1500   Periwound Assessment Clean;Dry;Intact;Scar tissue 04/28/21 1500   Margins RONNI 04/27/21 0817   Closure Secondary intention 04/28/21 1500   Drainage Amount Small 04/28/21 1500   Drainage Description Serosanguineous 04/28/21 1500   Treatments Cleansed;Site care;Irrigation;CSWD - Conservative Sharp Wound Debridement;Compression 04/28/21 1500   Wound Cleansing Approved Wound Cleanser 04/28/21 1500   Periwound Protectant Benzoin;Paste Ring;Drape 04/28/21 1500   Dressing Cleansing/Solutions Not Applicable 04/28/21 1500   Dressing Options Collagen Dressing;Wound Vac;Compression Wrap Two Layer;Mepilex 04/28/21 1500   Dressing Changed Changed 04/28/21 1500   Dressing Status Clean;Dry;Intact 04/28/21 1500   Dressing Change/Treatment  Frequency Monday, Wednesday, Friday, and As Needed 04/28/21 1500   NEXT Dressing Change/Treatment Date 04/30/21 04/28/21 1500   NEXT Weekly Photo (Inpatient Only) 04/30/21 04/26/21 1500   Shape irregular x2 04/19/21 1800   Wound Odor Mild 04/28/21 1500   Pulses Left;2+;DP;PT 04/28/21 1500   Exposed Structures None 04/28/21 1500   WOUND NURSE ONLY - Time Spent with Patient (mins) 60 04/28/21 1500            MEASUREMENTS:   Medial: 11.5 x 3 x 0.1  Lateral: 13.5 x 2 x 0.1     Lab Values:    Lab Results   Component Value Date/Time    WBC 7.1 04/13/2021 03:45 AM    RBC 4.20 (L) 04/13/2021 03:45 AM    HEMOGLOBIN 11.2 (L) 04/13/2021 03:45 AM    HEMATOCRIT 34.8 (L) 04/13/2021 03:45 AM    CREACTPROT 1.07 (H) 08/12/2020 01:55 PM    SEDRATEWES 85 (H) 08/07/2020 01:20 PM    HBA1C 5.7 (H) 11/09/2018 06:30 PM      Culture Results show:  Recent Results (from the past 720 hour(s))   CULTURE WOUND W/ GRAM STAIN    Collection Time: 09/01/20  2:00 PM    Specimen: Left Leg; Wound   Result Value Ref Range    Significant Indicator POS (POS)     Source WND     Site LEFT LEG     Culture Result - (A)     Gram Stain Result       Moderate Gram positive cocci.  Moderate Gram positive rods.      Culture Result (A)      Beta Hemolytic Streptococcus group A  Moderate growth      Culture Result (A)      Methicillin Resistant Staphylococcus aureus  Moderate growth      Culture Result (A)      Diphtheroids  Moderate growth  Mixed morphologies.     KOSTAS: 9/20/21   Left.    Doppler waveforms of the common femoral artery are of high amplitude and    triphasic.    Doppler waveforms at the ankle are brisk and triphasic.    Ankle-brachial index is normal.    Unable to obtained popliteal waveforms due to wound bandages.     Self Report / Pain Level: Pre-medicated with PO oxycodone     INTERVENTIONS BY WOUND TEAM: INTERVENTIONS BY WOUND TEAM:  Performed standard wound care which includes appropriate positioning, dressing removal and non-selective  debridement. Pictures and measurements obtained weekly if/when required.    Cleansed: Wound cleanser and gauze used to clean wound beds  Debridement: NA  Periwound: Cleansed with cleanser and gauze. Prepped with benzoin and paste rings..  Primary -  Collagen, half cut foam cut to shape of wound beds applied then secured with drape. TRAC pad applied to each wound bed and Y-sited. Suction at 150mmHg   Secondary -  2 layer compression wrap applied. mepilex to secure tubing    Interdisciplinary consultation: Patient, Bedside RN    EVALUATION / RATIONALE FOR TREATMENT:  4/28/21: Patient discharging on Monday May 3rd tentatively according to discharge plan.  Will plan to replace wound VAC in the AM for discharge during the day.   4/26/21: Collagen restarted as wound bed debrided by this wound care RN.  Patient pending discharge within this week.  Outpatient wound care referral in place for outpatient wound care and home health to change wound VAC.  Possible to restart biologic placement to left LE medial and posterior lateral wounds to expedite healing and closure of wounds.   4/23/21: No agata applied during this dressing change. Wound beds with scant amount of biofilm, had mild odor. Continue NPWT and 2 layer compression. Can apply AGATA at next change.   4/21: agata applied today as no biologic was placed last time. Wounds remain stable.Was asked to check lateral leg for pressure injury. No evidence of pressure injury to medial or lateral leg.  4/19/2021 wounds decreasing in size, pink and granular tissue continue NPWT and two layer compression.   4/16/21:  Medial wound still decreasing in size, less progress on the lateral wound. Foul odor continues to be present, now concentrated in vac cannister, no other overt signs of infection.  Consider continuing with agata with next vac change to encourage tissue granulation.   04/14/21:  No biologic to be applied this week due to approval issues.  No change in wound.    4/12/21: Medial wound appears to have decreased in size. Biologic being applied by LPS APRN weekly. Per LPS will reapply Biologic on Friday 4/16/21.   4/9: debridement and biologic placement performed by LPS APN. Adaptic to remain in place during next change.  4/7: Biologic to be reapplied this week by LPS with possible debridement if deemed appropriate.  Wounds remain stable and appear to be to surface level. Y connect on tracpad still in use.   4/4/21: Received call pt had pulled trac pad off. Whole dressing changed. Y connected wounds with 2 separate trac pads to each wound to decrease risk of loss of NPWT. Bridged lateral trac pad up along thigh to keep out of compressed area to ease replacement if pt pulls off and making it more difficult for pt to dislodge.   4/2/2021 - Debridement performed by APRN as patient's wounds continue to fail to contract. Biologic in place to bilateral wound beds and will be applied by RAFITA Goldberg again next Friday 4/11/2021. During next vac change, adaptic to remain in place as it is protecting the biologic underneath.   3/30/21: Wound odor mild-moderate and wound bed bright red, now 100% granulation tissue.  Meredith-skin continues to be fragile/flakey; protected with hydrocolloid dressing to wound border.    3/26/21: Foul odor present at dressing removal, but significantly decreased after cleansing.  Continue current POC.  3/24/21 Wound bed red, with less drainage noted.  Strong, foul odor still present.  APRN to see pt regarding wound bed. Continue with current plan of care.   3/22/21: Area unchanged from previous dressing change.  Wound malodorous.  Meredith-skin dry and flaky at superior wound border, hydocolloid thin dressing placed.   3/13/21 Wounds failing to contract.  Will trial intermittent wound vac setting to encourage greater stimulation of cellular activity in wound bed.    3/10/2021: debrided wound, odor present, continue NPWT  3/6/12 area unchanged, odor  persists  3/1/21: Lidocaine unavailable during this dressing change. Small amount of debridement completed with curette. Continued with silver powder for its antimicrobial properties.   2/27/21 skin bridge enlarging  2/24/2021: Excisional debridement by LPS APRN performed for rolled edges that were starting to form along the posterior and medial left leg wound. Moderate bleeding managed by administration of lidocaine injection. CSWD performed for slough. Wound bed is beefy red following debridement. Resume agata and silver foam to manage bioburden. Continue with CSWD with each dressing change.   2/22/2021: Rolled edges starting to form along posterior thigh wound bed - may require excisional debridement by provider. Minimal changes to actual wound bed. Continue CSWD, agata, and silver foam to manage bioburden.   2/19/21: minimal to no changes from last assessment. Continue CSWD, agata, and silver foam for bioburden.       Goals: Steady decrease in wound area and depth weekly.    NURSING PLAN OF CARE ORDERS (X):    Dressing changes: See Dressing Care orders: X  Skin care: See Skin Care orders:   Rectal tube care: See Rectal Tube Care orders:   Other orders:      WOUND TEAM PLAN OF CARE:   Dressing changes by wound team:        Follow up 3 times weekly:                NPWT change 3 times weekly:  X,  NPWT changes 3x/ weekly with 2 layer compression wrap.  Follow up 1-2 times weekly:      Follow up Bi-Monthly:                   Follow up as needed:       Other (explain):     Anticipated discharge plans:    LTACH:        SNF/Rehab:       Home Health Care:           Outpatient Wound Center:            Self Care:           Other- [facility], [facility] with home health and outpatient wound care for biologic placement.

## 2021-04-28 NOTE — DISCHARGE PLANNING
@[id number]  Agency/Facility Name: [facility]  Spoke To: Caron  Outcome: [facility] accepted and so [facility] has him set to be seen on Friday as long as guardian accepts discharge.    @3162  Agency/Facility Name: [facility]  Outcome: Left message, awaiting call back.

## 2021-04-29 PROCEDURE — A9270 NON-COVERED ITEM OR SERVICE: HCPCS | Performed by: NURSE PRACTITIONER

## 2021-04-29 PROCEDURE — RXMED WILLOW AMBULATORY MEDICATION CHARGE: Performed by: NURSE PRACTITIONER

## 2021-04-29 PROCEDURE — 770001 HCHG ROOM/CARE - MED/SURG/GYN PRIV*

## 2021-04-29 PROCEDURE — 700102 HCHG RX REV CODE 250 W/ 637 OVERRIDE(OP): Performed by: NURSE PRACTITIONER

## 2021-04-29 PROCEDURE — 99231 SBSQ HOSP IP/OBS SF/LOW 25: CPT | Performed by: NURSE PRACTITIONER

## 2021-04-29 RX ORDER — AMLODIPINE BESYLATE 5 MG/1
5 TABLET ORAL DAILY
Qty: 30 TABLET | Refills: 2 | Status: SHIPPED | OUTPATIENT
Start: 2021-04-30 | End: 2021-08-25 | Stop reason: SDUPTHER

## 2021-04-29 RX ORDER — OXYCODONE HYDROCHLORIDE 5 MG/1
5 TABLET ORAL EVERY 6 HOURS PRN
Qty: 18 TABLET | Refills: 0 | Status: SHIPPED | OUTPATIENT
Start: 2021-04-29 | End: 2021-05-08

## 2021-04-29 RX ORDER — LIDOCAINE 50 MG/G
1 PATCH TOPICAL EVERY 24 HOURS
Qty: 10 PATCH | Refills: 0 | Status: SHIPPED | OUTPATIENT
Start: 2021-04-30 | End: 2021-09-23

## 2021-04-29 RX ORDER — DIVALPROEX SODIUM 125 MG/1
125 CAPSULE, COATED PELLETS ORAL EVERY 8 HOURS
Qty: 120 CAPSULE | Refills: 3 | Status: SHIPPED | OUTPATIENT
Start: 2021-04-29 | End: 2021-08-25 | Stop reason: SDUPTHER

## 2021-04-29 RX ORDER — CYCLOBENZAPRINE HCL 10 MG
10 TABLET ORAL 3 TIMES DAILY PRN
Qty: 90 TABLET | Refills: 0 | Status: SHIPPED | OUTPATIENT
Start: 2021-04-29 | End: 2021-08-25 | Stop reason: SDUPTHER

## 2021-04-29 RX ORDER — GABAPENTIN 300 MG/1
300 CAPSULE ORAL 3 TIMES DAILY
Qty: 90 CAPSULE | Refills: 3 | Status: SHIPPED | OUTPATIENT
Start: 2021-04-29 | End: 2021-07-28

## 2021-04-29 RX ORDER — AMOXICILLIN 250 MG
2 CAPSULE ORAL 2 TIMES DAILY
Qty: 60 TABLET | Refills: 0 | Status: SHIPPED | OUTPATIENT
Start: 2021-04-29 | End: 2021-08-25 | Stop reason: SDUPTHER

## 2021-04-29 RX ORDER — RISPERIDONE 0.5 MG/1
0.5 TABLET ORAL DAILY
Qty: 30 TABLET | Refills: 3 | Status: SHIPPED | OUTPATIENT
Start: 2021-04-30 | End: 2021-07-28

## 2021-04-29 RX ORDER — TRAZODONE HYDROCHLORIDE 50 MG/1
50 TABLET ORAL
Qty: 30 TABLET | Refills: 3 | Status: SHIPPED | OUTPATIENT
Start: 2021-04-29 | End: 2021-07-22 | Stop reason: SDUPTHER

## 2021-04-29 RX ORDER — MORPHINE SULFATE 15 MG/1
15 TABLET, FILM COATED, EXTENDED RELEASE ORAL EVERY 12 HOURS
Qty: 60 TABLET | Refills: 0 | Status: SHIPPED | OUTPATIENT
Start: 2021-04-29 | End: 2021-06-02

## 2021-04-29 RX ORDER — RISPERIDONE 1 MG/1
1 TABLET ORAL EVERY EVENING
Qty: 30 TABLET | Refills: 3 | Status: SHIPPED | OUTPATIENT
Start: 2021-04-29 | End: 2021-07-28

## 2021-04-29 RX ORDER — METHOCARBAMOL 500 MG/1
500 TABLET, FILM COATED ORAL 4 TIMES DAILY
Qty: 120 TABLET | Refills: 0 | Status: SHIPPED | OUTPATIENT
Start: 2021-04-29 | End: 2021-07-22

## 2021-04-29 RX ORDER — HYDROXYZINE HYDROCHLORIDE 25 MG/1
25 TABLET, FILM COATED ORAL 3 TIMES DAILY PRN
Qty: 60 TABLET | Refills: 0 | Status: SHIPPED | OUTPATIENT
Start: 2021-04-29 | End: 2021-09-22 | Stop reason: SDUPTHER

## 2021-04-29 RX ADMIN — RISPERIDONE 0.5 MG: 0.5 TABLET ORAL at 04:41

## 2021-04-29 RX ADMIN — MORPHINE SULFATE 15 MG: 15 TABLET, FILM COATED, EXTENDED RELEASE ORAL at 04:41

## 2021-04-29 RX ADMIN — MORPHINE SULFATE 15 MG: 15 TABLET, FILM COATED, EXTENDED RELEASE ORAL at 17:22

## 2021-04-29 RX ADMIN — DIVALPROEX SODIUM 125 MG: 125 CAPSULE ORAL at 21:00

## 2021-04-29 RX ADMIN — AMLODIPINE BESYLATE 5 MG: 5 TABLET ORAL at 04:41

## 2021-04-29 RX ADMIN — METHOCARBAMOL TABLETS 500 MG: 500 TABLET, COATED ORAL at 17:22

## 2021-04-29 RX ADMIN — METHOCARBAMOL TABLETS 500 MG: 500 TABLET, COATED ORAL at 04:41

## 2021-04-29 RX ADMIN — GABAPENTIN 300 MG: 300 CAPSULE ORAL at 17:22

## 2021-04-29 RX ADMIN — TRAZODONE HYDROCHLORIDE 50 MG: 100 TABLET ORAL at 21:02

## 2021-04-29 RX ADMIN — METHOCARBAMOL TABLETS 500 MG: 500 TABLET, COATED ORAL at 21:02

## 2021-04-29 RX ADMIN — RISPERIDONE 1 MG: 1 TABLET ORAL at 17:22

## 2021-04-29 RX ADMIN — GABAPENTIN 300 MG: 300 CAPSULE ORAL at 04:41

## 2021-04-29 RX ADMIN — METHOCARBAMOL TABLETS 500 MG: 500 TABLET, COATED ORAL at 12:38

## 2021-04-29 RX ADMIN — DIVALPROEX SODIUM 125 MG: 125 CAPSULE ORAL at 04:41

## 2021-04-29 RX ADMIN — GABAPENTIN 300 MG: 300 CAPSULE ORAL at 12:38

## 2021-04-29 RX ADMIN — DIVALPROEX SODIUM 125 MG: 125 CAPSULE ORAL at 14:10

## 2021-04-29 ASSESSMENT — ENCOUNTER SYMPTOMS
MYALGIAS: 1
MEMORY LOSS: 1
EYE PAIN: 0
DIZZINESS: 0
SHORTNESS OF BREATH: 0
DIARRHEA: 0
NAUSEA: 0
EYE REDNESS: 0
DEPRESSION: 0
SORE THROAT: 0
WEAKNESS: 0
VOMITING: 0
PALPITATIONS: 0
NERVOUS/ANXIOUS: 1
CONSTIPATION: 0
FEVER: 0
COUGH: 0
INSOMNIA: 1
ABDOMINAL PAIN: 0
HEADACHES: 0

## 2021-04-29 ASSESSMENT — PAIN DESCRIPTION - PAIN TYPE: TYPE: ACUTE PAIN

## 2021-04-29 NOTE — CARE PLAN
Problem: Safety  Goal: Will remain free from injury  Outcome: PROGRESSING AS EXPECTED  Note: Patient educated of risk of fall, bed locked and at lowest position, nonskid footwear in use, tele sitter in place  Goal: Will remain free from falls  Description: Pt mobilizes frequently independently.   Outcome: PROGRESSING AS EXPECTED     Problem: Psychosocial Needs:  Goal: Level of anxiety will decrease  Outcome: PROGRESSING AS EXPECTED     Problem: Mobility  Goal: Risk for activity intolerance will decrease  Outcome: PROGRESSING AS EXPECTED     Problem: Skin Integrity  Goal: Risk for impaired skin integrity will decrease  Outcome: PROGRESSING AS EXPECTED

## 2021-04-29 NOTE — PROGRESS NOTES
Hospital Medicine Twice Weekly Progress Note    Date of Service  4/29/2021    Chief Complaint  LLE wound infection    Hospital Course  Mr. Varela is a 65-year-old male with a PMHx of alcohol dependence, tobacco dependence, psychiatric disorder, and HTN who presented to the emergency department on 8/7/2020 with a left lower extremity wound infection. He was hospitalized at our facility last April and was evaluated by orthopedic surgery who recommended ongoing wound care. Wound cultures at that time grew MSSA and Streptococcus. He had been seen at [facility] earlier that week and prescribed antibiotics, but never filled the prescription.  An ultrasound of that extremity was done and was negative for DVT.  He was treated for sepsis and completed an antibiotic course on 9/16/2020. He was also found to have head lice and underwent treatment for that.  A repeat wound culture was done on 9/28/2020 and grew Strep A and Proteus. Infectious disease was consulted and recommended a 5-day course of IV zosyn which was completed on 10/5/2020. On 10/12/2020 the wound care team noticed increased inflammation to the proximal part of the wound bed and switched from a vera flow wound VAC to a regular wound VAC.  ID was again consulted and on 10/14/2020 recommended a 7-day course of antibiotics with meropenem which was completed on 10/22/2020. Additionally, he was noted to have intermittent agitation so was started on risperdal, depakote, and gabapentin per psychiatry recommendations.  On 11/20/2020 he underwent left lower extremity debridement with wound VAC placement. Since then he has remained stable but has been deemed incapacitated to make medical decisions so guardianship was pursued and granted on 1/29/21. Placement will likely be to a group home with home health services.     Interval Problem Update  4/29:  Patient seen and examined.  He is sitting in chair watching TV.  Mood is stable.  I asked if he needed anything  "and he responded \"a [employer] magazine.\"  Informed patient we do not provide that particular magazine.  Otherwise no acute complaints.     Consultants/Specialty  Orthopedic surgery  Infectious disease  Psychiatry    Code Status  Full Code    Disposition  Guardianship established. Possible group home with home health on Monday      Review of Systems  Review of Systems   Constitutional: Negative for fever and malaise/fatigue.   HENT: Negative for congestion and sore throat.    Eyes: Negative for pain and redness.   Respiratory: Negative for cough and shortness of breath.    Cardiovascular: Positive for leg swelling (LLE). Negative for chest pain and palpitations.   Gastrointestinal: Negative for abdominal pain, constipation, diarrhea, nausea and vomiting.   Genitourinary: Negative for dysuria, frequency and urgency.   Musculoskeletal: Positive for myalgias (leg soreness ).   Neurological: Negative for dizziness, weakness and headaches.   Psychiatric/Behavioral: Positive for memory loss. Negative for depression. The patient is nervous/anxious and has insomnia.       Physical Exam  Temp:  [36.5 °C (97.7 °F)-36.7 °C (98.1 °F)] 36.7 °C (98.1 °F)  Pulse:  [73-84] 84  Resp:  [18-19] 18  BP: (101-127)/(69-94) 127/94  SpO2:  [91 %-96 %] 92 %    Physical Exam  Vitals and nursing note reviewed.   Constitutional:       General: He is awake.      Appearance: He is well-developed. He is ill-appearing (chronically ill appearing).   HENT:      Head: Normocephalic and atraumatic.      Mouth/Throat:      Lips: Pink.      Mouth: Mucous membranes are moist.   Eyes:      General: Lids are normal.      Conjunctiva/sclera: Conjunctivae normal.      Comments: PERRL   Cardiovascular:      Rate and Rhythm: Normal rate.      Pulses: Normal pulses.      Heart sounds: Normal heart sounds.   Pulmonary:      Effort: Pulmonary effort is normal.      Breath sounds: Normal breath sounds.   Abdominal:      General: Bowel sounds are normal. There is no " distension.      Palpations: Abdomen is soft.      Tenderness: There is no abdominal tenderness.   Musculoskeletal:      Cervical back: Neck supple.      Right lower leg: No edema.      Left lower leg: No edema.      Comments: L knee contracture   Skin:     General: Skin is warm and dry.          Neurological:      General: No focal deficit present.      Mental Status: He is alert. He is disoriented and confused.      Gait: Gait normal.   Psychiatric:         Attention and Perception: Attention normal.         Mood and Affect: Mood is anxious. Affect is labile.         Speech: Speech normal.         Behavior: Behavior is agitated and aggressive. Behavior is cooperative.         Thought Content: Thought content is delusional.         Cognition and Memory: Cognition is impaired. Memory is impaired. He exhibits impaired recent memory and impaired remote memory.         Judgment: Judgment is impulsive and inappropriate.       Exam unchanged from prior     Fluids    Intake/Output Summary (Last 24 hours) at 4/29/2021 0914  Last data filed at 4/28/2021 1638  Gross per 24 hour   Intake 480 ml   Output --   Net 480 ml     Laboratory    Imaging  IR-US GUIDED PIV   Final Result    Ultrasound-guided PERIPHERAL IV INSERTION performed by    qualified nursing staff as above.      MR-BRAIN-W/O   Final Result      MRI of the brain without contrast within normal limits for age with moderate atrophy and mild white matter changes.      FA-IGONMPH-8 VIEW   Final Result      No contraindication to MRI      DX-CHEST-LIMITED (1 VIEW)   Final Result      1.  Mild atelectasis      2.  No contraindication to MRI      DX-SKULL-LIMITED 3-   Final Result      Negative for metallic foreign body or contraindication to MRI      CT-HEAD W/O   Final Result      No acute intracranial hemorrhage is identified.      Mild white matter hypodensity is present.  This is a nonspecific finding which usually is found to represent chronic microvascular disease  in patient's of this demographic.  Demyelination, age indeterminant ischemia and gliosis are also common    possibilities.      Mild parenchymal atrophy      DX-CHEST-LIMITED (1 VIEW)   Final Result      No evidence of acute cardiopulmonary process.      IR-US GUIDED PIV   Final Result    Ultrasound-guided PERIPHERAL IV INSERTION performed by    qualified nursing staff as above.      IR-MIDLINE CATHETER INSERTION WO GUIDANCE > AGE 3   Final Result                  Ultrasound-guided midline placement performed by qualified nursing staff    as above.          IR-US GUIDED PIV   Final Result    Ultrasound-guided PERIPHERAL IV INSERTION performed by    qualified nursing staff as above.      IR-MIDLINE CATHETER INSERTION WO GUIDANCE > AGE 3   Final Result                  Ultrasound-guided midline placement performed by qualified nursing staff    as above.          US-KOSTAS SINGLE LEVEL BILAT   Final Result      CT-HEAD W/O   Final Result      No acute intracranial abnormality      DX-TIBIA AND FIBULA LEFT   Final Result      1.  Healed distal metaphyseal fractures of the left fibula and tibia with tibial IM layla in place.      2.  No acute fracture or dislocation.      3.  No radiopaque soft tissue foreign body.      DX-FEMUR-2+ LEFT   Final Result      1.  No radiographic evidence of acute traumatic injury left femur.      2.  Unchanged cortical thickening in the posterior aspect of the distal femoral diaphysis which may represent sequela of prior injury or infection.      3.  No soft tissue foreign body identified.      US-EXTREMITY VENOUS LOWER UNILAT LEFT   Final Result      DX-CHEST-PORTABLE (1 VIEW)   Final Result      No acute cardiopulmonary abnormality.         Assessment/Plan  * Wound of left leg- (present on admission)  Assessment & Plan  -Wound vac to LLE. Wound care following, appreciate assistance.   -Pain controlled, continue med regimen as currently ordered.   -Monitor for evidence of infection.   -Home  health and home Wound VAC to be placed   -Possible discharge on Monday.   to manage wound vac as outpatient.     Encephalopathy, unspecified- (present on admission)  Assessment & Plan  -intermittently symptomatic.  Somewhat confused,  agitated.  Poor memory.    -Labs within normal limits. Clinical picture does not indicate infection.  -Stat CT head obtained and was negative.  -MRI within normal limits  -Depakote level low.  -No recent changes to medications.    Psychiatric disorder- (present on admission)  Assessment & Plan  -Unspecified.  -Continue Risperdal and Depakote.   -Psychiatry was consulted and deemed him incapacitated to leave AMA or make medical decisions.  -SLP repeated cognitive evaluation in Jan 2021- continues to recommend 24/7 supervision.   -Guardianship established  - at discharge.     Essential hypertension- (present on admission)  Assessment & Plan  -Well controlled on current regimen.   -Continue amlodipine.     Emphysema/COPD (HCC)- (present on admission)  Assessment & Plan  -Chronic and stable off medication.  -No acute exacerbation.     Protein malnutrition (HCC)- (present on admission)  Assessment & Plan  -Body mass index is 21.53 kg/m².-Slightly improving.   -Continue TID supplements.   -Encourage PO intake.    Flexion contracture of knee, 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)  Assessment & Plan  -Secondary to chronic wound in that area.  -Continue PT/OT.  -Does not seem to limit his mobility. Is frequently ambulatory.  -Continue PRN flexeril. Robaxin added 3/14/2021.     VTE prophylaxis: Ambulatory

## 2021-04-29 NOTE — CARE PLAN
Care Plan  Problem: Psychosocial Needs:  Goal: Level of anxiety will decrease  Outcome: NOT MET     Problem: Mobility  Goal: Risk for activity intolerance will decrease  Outcome: PROGRESSING AS EXPECTED    Telesitter in place. Pt very mobile however forgetful and often forgets the wound vac is connected to him as he ambulates. Aox1-2 but forgetful, SBA, RA.

## 2021-04-30 PROCEDURE — 700102 HCHG RX REV CODE 250 W/ 637 OVERRIDE(OP): Performed by: NURSE PRACTITIONER

## 2021-04-30 PROCEDURE — A9270 NON-COVERED ITEM OR SERVICE: HCPCS | Performed by: NURSE PRACTITIONER

## 2021-04-30 PROCEDURE — 97605 NEG PRS WND THER DME<=50SQCM: CPT

## 2021-04-30 PROCEDURE — RXMED WILLOW AMBULATORY MEDICATION CHARGE: Performed by: NURSE PRACTITIONER

## 2021-04-30 PROCEDURE — 770001 HCHG ROOM/CARE - MED/SURG/GYN PRIV*

## 2021-04-30 PROCEDURE — 700101 HCHG RX REV CODE 250: Performed by: NURSE PRACTITIONER

## 2021-04-30 PROCEDURE — 302098 PASTE RING (FLAT): Performed by: NURSE PRACTITIONER

## 2021-04-30 RX ADMIN — DOCUSATE SODIUM 50 MG AND SENNOSIDES 8.6 MG 2 TABLET: 8.6; 5 TABLET, FILM COATED ORAL at 05:26

## 2021-04-30 RX ADMIN — DIVALPROEX SODIUM 125 MG: 125 CAPSULE ORAL at 21:14

## 2021-04-30 RX ADMIN — GABAPENTIN 300 MG: 300 CAPSULE ORAL at 12:40

## 2021-04-30 RX ADMIN — GABAPENTIN 300 MG: 300 CAPSULE ORAL at 19:13

## 2021-04-30 RX ADMIN — LIDOCAINE 1 PATCH: 50 PATCH CUTANEOUS at 15:07

## 2021-04-30 RX ADMIN — HYDROXYZINE HYDROCHLORIDE 25 MG: 50 TABLET, FILM COATED ORAL at 21:14

## 2021-04-30 RX ADMIN — RISPERIDONE 0.5 MG: 0.5 TABLET ORAL at 05:27

## 2021-04-30 RX ADMIN — METHOCARBAMOL TABLETS 500 MG: 500 TABLET, COATED ORAL at 12:40

## 2021-04-30 RX ADMIN — RISPERIDONE 1 MG: 1 TABLET ORAL at 19:17

## 2021-04-30 RX ADMIN — AMLODIPINE BESYLATE 5 MG: 5 TABLET ORAL at 05:26

## 2021-04-30 RX ADMIN — TRAZODONE HYDROCHLORIDE 50 MG: 100 TABLET ORAL at 21:14

## 2021-04-30 RX ADMIN — MORPHINE SULFATE 15 MG: 15 TABLET, FILM COATED, EXTENDED RELEASE ORAL at 05:27

## 2021-04-30 RX ADMIN — OXYCODONE 5 MG: 5 TABLET ORAL at 15:10

## 2021-04-30 RX ADMIN — DIVALPROEX SODIUM 125 MG: 125 CAPSULE ORAL at 15:10

## 2021-04-30 RX ADMIN — METHOCARBAMOL TABLETS 500 MG: 500 TABLET, COATED ORAL at 19:13

## 2021-04-30 RX ADMIN — DIVALPROEX SODIUM 125 MG: 125 CAPSULE ORAL at 05:27

## 2021-04-30 RX ADMIN — GABAPENTIN 300 MG: 300 CAPSULE ORAL at 05:27

## 2021-04-30 RX ADMIN — DOCUSATE SODIUM 50 MG AND SENNOSIDES 8.6 MG 2 TABLET: 8.6; 5 TABLET, FILM COATED ORAL at 19:14

## 2021-04-30 RX ADMIN — MORPHINE SULFATE 15 MG: 15 TABLET, FILM COATED, EXTENDED RELEASE ORAL at 19:13

## 2021-04-30 RX ADMIN — METHOCARBAMOL TABLETS 500 MG: 500 TABLET, COATED ORAL at 05:24

## 2021-04-30 RX ADMIN — METHOCARBAMOL TABLETS 500 MG: 500 TABLET, COATED ORAL at 21:14

## 2021-04-30 RX ADMIN — OXYCODONE 5 MG: 5 TABLET ORAL at 21:14

## 2021-04-30 ASSESSMENT — PAIN DESCRIPTION - PAIN TYPE
TYPE: ACUTE PAIN

## 2021-04-30 NOTE — WOUND TEAM
[facility]  Inpatient Services  Wound and Skin Care Progress Note    Admission Date: 8/7/2020     Last order of IP CONSULT TO WOUND CARE was found on 9/1/2020 from Hospital Encounter on 8/7/2020     HPI, PMH, SH: Reviewed    Unit where seen by Wound Team: [id number]    WOUND CONSULT/FOLLOW UP RELATED TO:  Left leg scheduled negative pressure wound therapy (npwt) dressing change and 2 layer compression wrap change.    WOUND TYPE, LOCATION, CHARACTERISTICS (Pressure Injuries: location, stage, POA or date identified)      Negative Pressure Wound Therapy 11/20/20 Leg Lateral;Posterior;Medial Left (Active)   Vacuum Serial Number [id number] 04/22/21 0749   NPWT Pump Mode / Pressure Setting Ulta;Continuous;150 mmHg 04/30/21 1100   Dressing Type Medium;Black Foam (Regular) 04/30/21 1100   Number of Foam Pieces Used 3 04/30/21 1100   Canister Changed No 04/30/21 1100   Output (mL) 500 mL 04/22/21 1300   NEXT Dressing Change/Treatment Date 05/03/21 04/30/21 1100   WOUND NURSE ONLY - Time Spent with Patient (mins) 60 04/30/21 1100           Wound 11/19/20 Full Thickness Wound Leg Lateral;Posterior;Medial Left (Active)   Wound Image      04/30/21 1100   Site Assessment Red;Drainage 04/30/21 1100   Periwound Assessment Pink;Scar tissue;Dry 04/30/21 1100   Margins Attached edges 04/30/21 1100   Closure Secondary intention 04/30/21 1100   Drainage Amount Small 04/30/21 1100   Drainage Description Serosanguineous 04/30/21 1100   Treatments Cleansed;Site care;Offloading 04/30/21 1100   Wound Cleansing Approved Wound Cleanser 04/30/21 1100   Periwound Protectant Drape;Paste Ring;Skin Protectant Wipes to Periwound 04/30/21 1100   Dressing Cleansing/Solutions Not Applicable 04/30/21 1100   Dressing Options Wound Vac 04/30/21 1100   Dressing Changed Changed 04/30/21 1100   Dressing Status Clean;Dry;Intact 04/30/21 1100   Dressing Change/Treatment Frequency Monday, Wednesday, Friday, and As Needed 04/30/21 1100   NEXT  Dressing Change/Treatment Date 05/03/21 04/30/21 1100   NEXT Weekly Photo (Inpatient Only) 05/07/21 04/30/21 1100   Non-staged Wound Description Full thickness 04/30/21 1100   Wound Length (cm)     Wound Width (cm)     Wound Depth (cm)     Wound Surface Area (cm^2)     Wound Volume (cm^3)     Post-Procedure Length (cm)     Post-Procedure Width (cm)     Post-Procedure Depth (cm)     Post-Procedure Surface Area (cm^2)     Post-Procedure Volume (cm^3)     Wound Healing %     Wound Bed Granulation (%)     Wound Bed Slough (%)     Wound Bed Granulation (%) - Post-Procedure     Tunneling (cm)     Undermining (cm)     Shape irregular x 2 04/30/21 1100   Wound Odor Mild 04/30/21 1100   Pulses Left;2+;DP;PT 04/28/21 1500   Exposed Structures None 04/30/21 1100   WOUND NURSE ONLY - Time Spent with Patient (mins) 60 04/30/21 1100       Measurements:   LLE medial 11 x 2   LLE posterior 13 x 4.5      Lab Values:    Lab Results   Component Value Date/Time    WBC 7.1 04/13/2021 03:45 AM    RBC 4.20 (L) 04/13/2021 03:45 AM    HEMOGLOBIN 11.2 (L) 04/13/2021 03:45 AM    HEMATOCRIT 34.8 (L) 04/13/2021 03:45 AM    CREACTPROT 1.07 (H) 08/12/2020 01:55 PM    SEDRATEWES 85 (H) 08/07/2020 01:20 PM    HBA1C 5.7 (H) 11/09/2018 06:30 PM      Culture Results show:  Recent Results (from the past 720 hour(s))   CULTURE WOUND W/ GRAM STAIN    Collection Time: 09/01/20  2:00 PM    Specimen: Left Leg; Wound   Result Value Ref Range    Significant Indicator POS (POS)     Source WND     Site LEFT LEG     Culture Result - (A)     Gram Stain Result       Moderate Gram positive cocci.  Moderate Gram positive rods.      Culture Result (A)      Beta Hemolytic Streptococcus group A  Moderate growth      Culture Result (A)      Methicillin Resistant Staphylococcus aureus  Moderate growth      Culture Result (A)      Diphtheroids  Moderate growth  Mixed morphologies.     KOSTAS: 9/20/21   Left.    Doppler waveforms of the common femoral artery are of high  amplitude and    triphasic.    Doppler waveforms at the ankle are brisk and triphasic.    Ankle-brachial index is normal.    Unable to obtained popliteal waveforms due to wound bandages.     Self Report / Pain Level: Pre-medicated with PO oxycodone     INTERVENTIONS BY WOUND TEAM: INTERVENTIONS BY WOUND TEAM:  Performed standard wound care which includes appropriate positioning, dressing removal and non-selective debridement. Pictures and measurements obtained weekly if/when required.    Cleansed: Wound cleanser and gauze used to clean wound beds  Debridement: Slough and non viable tissue debrided away using curette.  <20 cm2 debrided. Small bleeding noted, controlled with manual pressure.  Periwound: Cleansed with cleanser and gauze. Prepped with no sting skin prep and paste rings.  Primary -  Collagen, half cut foam cut to shape of wound beds applied then secured with drape. TRAC pad applied to each wound bed and Y-sited. Suction at 150mmHg   Secondary -  2 layer compression wrap applied. mepilex to secure tubing    Interdisciplinary consultation: Patient, Bedside RN    EVALUATION / RATIONALE FOR TREATMENT:  4/30/21: Vanessa applied to both wound beds following debridement to facilitate wound contraction. Superior edge to medial wound bed nearly closed. Tentative plan for discharge still on Monday May 3rd, however no time has been set as of this time.     4/28/21: Patient discharging on Monday May 3rd tentatively according to discharge plan.  Will plan to replace wound VAC in the AM for discharge during the day.   4/26/21: Collagen restarted as wound bed debrided by this wound care RN.  Patient pending discharge within this week.  Outpatient wound care referral in place for outpatient wound care and home health to change wound VAC.  Possible to restart biologic placement to left LE medial and posterior lateral wounds to expedite healing and closure of wounds.   4/23/21: No vanessa applied during this dressing  change. Wound beds with scant amount of biofilm, had mild odor. Continue NPWT and 2 layer compression. Can apply AGATA at next change.   4/21: agata applied today as no biologic was placed last time. Wounds remain stable.Was asked to check lateral leg for pressure injury. No evidence of pressure injury to medial or lateral leg.  4/19/2021 wounds decreasing in size, pink and granular tissue continue NPWT and two layer compression.   4/16/21:  Medial wound still decreasing in size, less progress on the lateral wound. Foul odor continues to be present, now concentrated in vac cannister, no other overt signs of infection.  Consider continuing with agata with next vac change to encourage tissue granulation.   04/14/21:  No biologic to be applied this week due to approval issues.  No change in wound.   4/12/21: Medial wound appears to have decreased in size. Biologic being applied by LPS APRN weekly. Per LPS will reapply Biologic on Friday 4/16/21.   4/9: debridement and biologic placement performed by LPS APN. Adaptic to remain in place during next change.  4/7: Biologic to be reapplied this week by LPS with possible debridement if deemed appropriate.  Wounds remain stable and appear to be to surface level. Y connect on tracpad still in use.   4/4/21: Received call pt had pulled trac pad off. Whole dressing changed. Y connected wounds with 2 separate trac pads to each wound to decrease risk of loss of NPWT. Bridged lateral trac pad up along thigh to keep out of compressed area to ease replacement if pt pulls off and making it more difficult for pt to dislodge.   4/2/2021 - Debridement performed by APRN as patient's wounds continue to fail to contract. Biologic in place to bilateral wound beds and will be applied by RAFITA Goldberg again next Friday 4/11/2021. During next vac change, adaptic to remain in place as it is protecting the biologic underneath.   3/30/21: Wound odor mild-moderate and wound bed bright red,  now 100% granulation tissue.  Meredith-skin continues to be fragile/flakey; protected with hydrocolloid dressing to wound border.    3/26/21: Foul odor present at dressing removal, but significantly decreased after cleansing.  Continue current POC.  3/24/21 Wound bed red, with less drainage noted.  Strong, foul odor still present.  APRN to see pt regarding wound bed. Continue with current plan of care.   3/22/21: Area unchanged from previous dressing change.  Wound malodorous.  Meredith-skin dry and flaky at superior wound border, hydocolloid thin dressing placed.   3/13/21 Wounds failing to contract.  Will trial intermittent wound vac setting to encourage greater stimulation of cellular activity in wound bed.    3/10/2021: debrided wound, odor present, continue NPWT  3/6/12 area unchanged, odor persists  3/1/21: Lidocaine unavailable during this dressing change. Small amount of debridement completed with curette. Continued with silver powder for its antimicrobial properties.   2/27/21 skin bridge enlarging  2/24/2021: Excisional debridement by LPS APRN performed for rolled edges that were starting to form along the posterior and medial left leg wound. Moderate bleeding managed by administration of lidocaine injection. CSWD performed for slough. Wound bed is beefy red following debridement. Resume agata and silver foam to manage bioburden. Continue with CSWD with each dressing change.   2/22/2021: Rolled edges starting to form along posterior thigh wound bed - may require excisional debridement by provider. Minimal changes to actual wound bed. Continue CSWD, agata, and silver foam to manage bioburden.   2/19/21: minimal to no changes from last assessment. Continue CSWD, agata, and silver foam for bioburden.       Goals: Steady decrease in wound area and depth weekly.    NURSING PLAN OF CARE ORDERS (X):    Dressing changes: See Dressing Care orders: X  Skin care: See Skin Care orders:   Rectal tube care: See Rectal Tube  Care orders:   Other orders:      WOUND TEAM PLAN OF CARE:   Dressing changes by wound team:        Follow up 3 times weekly:                NPWT change 3 times weekly:  X,  NPWT changes 3x/ weekly with 2 layer compression wrap.  Follow up 1-2 times weekly:      Follow up Bi-Monthly:                   Follow up as needed:       Other (explain):     Anticipated discharge plans:    LTACH:        SNF/Rehab:       Home Health Care:           Outpatient Wound Center:            Self Care:           Other- GH, [facility] with home health and outpatient wound care for biologic placement.

## 2021-04-30 NOTE — DISCHARGE PLANNING
Anticipated Discharge Disposition:   Group Home with home health, wound vac, Meds to Beds    Action:   Discussed discharge planning needs with APRN. Per APRN, pt is medically cleared for discharge to  with HH and wound vac.     RN CM spoke to pt's Legal Guardian, Aletha Miguel A. Per Aletha, plan to discharge pt on Monday, [facility] will visit pt at the  to establish PCP on Tuesday, then [facility] will see pt on Wednesday. Lakia from [facility] is aware of discharge plan. Wound vac has been delivered at the bedside.    RN CM called pt's Legal Guardian, Aletha, if the group home has contacted her back for other possible requirements and if they can provide transportation for pt at discharge. No answer, left voicemail for call back.     Per previous conversation with Legal Guardian, she requested for Meds to Beds. APRN informed, prescriptions were sent to [facility] for Meds to Beds.    Addendum:  Received email from pt's Legal Guardian, Aletha Grady. Per Aletha, the group home is requesting for an updated COVID test before discharge on Monday. APRN and bedside RN informed via Voalte.    Per Aletha, Peterson from the  wants to  pt on Monday afternoon after wound vac change. Left information on hand off report to please coordinate wound vac change and transportation time with .    Peterson Liang is the  of [facility]. His phone number is [phone], the fax number is [phone] and his email is [email or web address].    Barriers to Discharge:   [facility] schedule after [facility] PCP visit.   Meds to Beds delivery  Repeat COVID test  Wound vac dressing change on Monday    Plan:   Plan for discharge to  with HH and wound vac on Monday 5/3/21.  Hospital Care Management will continue to follow and assist with discharge planning needs.

## 2021-04-30 NOTE — CARE PLAN
Care Plan    Problem: Infection  Goal: Will remain free from infection  Outcome: NOT MET     Problem: Discharge Barriers/Planning  Goal: Patient's continuum of care needs will be met  Outcome: PROGRESSING AS EXPECTED     Problem: Mobility  Goal: Risk for activity intolerance will decrease  Outcome: PROGRESSING AS EXPECTED

## 2021-05-01 PROCEDURE — 700102 HCHG RX REV CODE 250 W/ 637 OVERRIDE(OP): Performed by: NURSE PRACTITIONER

## 2021-05-01 PROCEDURE — A9270 NON-COVERED ITEM OR SERVICE: HCPCS | Performed by: NURSE PRACTITIONER

## 2021-05-01 PROCEDURE — 770001 HCHG ROOM/CARE - MED/SURG/GYN PRIV*

## 2021-05-01 PROCEDURE — 700101 HCHG RX REV CODE 250: Performed by: NURSE PRACTITIONER

## 2021-05-01 RX ADMIN — OXYCODONE 5 MG: 5 TABLET ORAL at 12:12

## 2021-05-01 RX ADMIN — DOCUSATE SODIUM 50 MG AND SENNOSIDES 8.6 MG 2 TABLET: 8.6; 5 TABLET, FILM COATED ORAL at 17:21

## 2021-05-01 RX ADMIN — CYCLOBENZAPRINE 10 MG: 10 TABLET, FILM COATED ORAL at 02:37

## 2021-05-01 RX ADMIN — MORPHINE SULFATE 15 MG: 15 TABLET, FILM COATED, EXTENDED RELEASE ORAL at 17:21

## 2021-05-01 RX ADMIN — RISPERIDONE 0.5 MG: 0.5 TABLET ORAL at 05:00

## 2021-05-01 RX ADMIN — GABAPENTIN 300 MG: 300 CAPSULE ORAL at 05:00

## 2021-05-01 RX ADMIN — METHOCARBAMOL TABLETS 500 MG: 500 TABLET, COATED ORAL at 12:09

## 2021-05-01 RX ADMIN — OXYCODONE 5 MG: 5 TABLET ORAL at 20:03

## 2021-05-01 RX ADMIN — GABAPENTIN 300 MG: 300 CAPSULE ORAL at 12:09

## 2021-05-01 RX ADMIN — METHOCARBAMOL TABLETS 500 MG: 500 TABLET, COATED ORAL at 17:21

## 2021-05-01 RX ADMIN — AMLODIPINE BESYLATE 5 MG: 5 TABLET ORAL at 05:00

## 2021-05-01 RX ADMIN — METHOCARBAMOL TABLETS 500 MG: 500 TABLET, COATED ORAL at 05:00

## 2021-05-01 RX ADMIN — DIVALPROEX SODIUM 125 MG: 125 CAPSULE ORAL at 22:36

## 2021-05-01 RX ADMIN — DIVALPROEX SODIUM 125 MG: 125 CAPSULE ORAL at 05:00

## 2021-05-01 RX ADMIN — METHOCARBAMOL TABLETS 500 MG: 500 TABLET, COATED ORAL at 20:03

## 2021-05-01 RX ADMIN — RISPERIDONE 1 MG: 1 TABLET ORAL at 17:21

## 2021-05-01 RX ADMIN — OXYCODONE 5 MG: 5 TABLET ORAL at 05:00

## 2021-05-01 RX ADMIN — LIDOCAINE 1 PATCH: 50 PATCH CUTANEOUS at 12:10

## 2021-05-01 RX ADMIN — GABAPENTIN 300 MG: 300 CAPSULE ORAL at 17:21

## 2021-05-01 RX ADMIN — DOCUSATE SODIUM 50 MG AND SENNOSIDES 8.6 MG 2 TABLET: 8.6; 5 TABLET, FILM COATED ORAL at 05:00

## 2021-05-01 RX ADMIN — DIVALPROEX SODIUM 125 MG: 125 CAPSULE ORAL at 14:20

## 2021-05-01 RX ADMIN — TRAZODONE HYDROCHLORIDE 50 MG: 100 TABLET ORAL at 20:02

## 2021-05-01 RX ADMIN — MORPHINE SULFATE 15 MG: 15 TABLET, FILM COATED, EXTENDED RELEASE ORAL at 05:00

## 2021-05-01 ASSESSMENT — PAIN SCALES - WONG BAKER
WONGBAKER_NUMERICALRESPONSE: HURTS JUST A LITTLE BIT
WONGBAKER_NUMERICALRESPONSE: HURTS A WHOLE LOT
WONGBAKER_NUMERICALRESPONSE: DOESN'T HURT AT ALL

## 2021-05-01 ASSESSMENT — PAIN DESCRIPTION - PAIN TYPE
TYPE: ACUTE PAIN

## 2021-05-01 NOTE — CARE PLAN
Problem: Safety  Goal: Will remain free from injury  Outcome: PROGRESSING AS EXPECTED     Problem: Infection  Goal: Will remain free from infection  Outcome: PROGRESSING AS EXPECTED     Problem: Discharge Barriers/Planning  Goal: Patient's continuum of care needs will be met  Outcome: PROGRESSING AS EXPECTED     Problem: Pain Management  Goal: Pain level will decrease to patient's comfort goal  Outcome: PROGRESSING AS EXPECTED

## 2021-05-01 NOTE — CARE PLAN
Problem: Safety  Goal: Will remain free from injury  Outcome: PROGRESSING AS EXPECTED     Problem: Pain Management  Goal: Pain level will decrease to patient's comfort goal  Outcome: PROGRESSING AS EXPECTED     Problem: Psychosocial Needs:  Goal: Level of anxiety will decrease  Outcome: PROGRESSING AS EXPECTED     Tele sitter. A+ O x2 - easily reoriented. Oxy and flexiril prn for pain.

## 2021-05-02 LAB
SARS-COV-2 RNA RESP QL NAA+PROBE: NOTDETECTED
SPECIMEN SOURCE: NORMAL

## 2021-05-02 PROCEDURE — 770001 HCHG ROOM/CARE - MED/SURG/GYN PRIV*

## 2021-05-02 PROCEDURE — 700102 HCHG RX REV CODE 250 W/ 637 OVERRIDE(OP): Performed by: NURSE PRACTITIONER

## 2021-05-02 PROCEDURE — A9270 NON-COVERED ITEM OR SERVICE: HCPCS | Performed by: NURSE PRACTITIONER

## 2021-05-02 PROCEDURE — U0005 INFEC AGEN DETEC AMPLI PROBE: HCPCS

## 2021-05-02 PROCEDURE — U0003 INFECTIOUS AGENT DETECTION BY NUCLEIC ACID (DNA OR RNA); SEVERE ACUTE RESPIRATORY SYNDROME CORONAVIRUS 2 (SARS-COV-2) (CORONAVIRUS DISEASE [COVID-19]), AMPLIFIED PROBE TECHNIQUE, MAKING USE OF HIGH THROUGHPUT TECHNOLOGIES AS DESCRIBED BY CMS-2020-01-R: HCPCS

## 2021-05-02 PROCEDURE — 700101 HCHG RX REV CODE 250: Performed by: NURSE PRACTITIONER

## 2021-05-02 RX ADMIN — RISPERIDONE 1 MG: 1 TABLET ORAL at 17:03

## 2021-05-02 RX ADMIN — AMLODIPINE BESYLATE 5 MG: 5 TABLET ORAL at 05:47

## 2021-05-02 RX ADMIN — TRAZODONE HYDROCHLORIDE 50 MG: 100 TABLET ORAL at 20:10

## 2021-05-02 RX ADMIN — DIVALPROEX SODIUM 125 MG: 125 CAPSULE ORAL at 12:05

## 2021-05-02 RX ADMIN — METHOCARBAMOL TABLETS 500 MG: 500 TABLET, COATED ORAL at 05:47

## 2021-05-02 RX ADMIN — MORPHINE SULFATE 15 MG: 15 TABLET, FILM COATED, EXTENDED RELEASE ORAL at 05:47

## 2021-05-02 RX ADMIN — MORPHINE SULFATE 15 MG: 15 TABLET, FILM COATED, EXTENDED RELEASE ORAL at 17:03

## 2021-05-02 RX ADMIN — HYDROXYZINE HYDROCHLORIDE 25 MG: 50 TABLET, FILM COATED ORAL at 20:10

## 2021-05-02 RX ADMIN — GABAPENTIN 300 MG: 300 CAPSULE ORAL at 17:03

## 2021-05-02 RX ADMIN — LIDOCAINE 1 PATCH: 50 PATCH CUTANEOUS at 12:05

## 2021-05-02 RX ADMIN — METHOCARBAMOL TABLETS 500 MG: 500 TABLET, COATED ORAL at 20:10

## 2021-05-02 RX ADMIN — METHOCARBAMOL TABLETS 500 MG: 500 TABLET, COATED ORAL at 12:05

## 2021-05-02 RX ADMIN — METHOCARBAMOL TABLETS 500 MG: 500 TABLET, COATED ORAL at 17:03

## 2021-05-02 RX ADMIN — DOCUSATE SODIUM 50 MG AND SENNOSIDES 8.6 MG 2 TABLET: 8.6; 5 TABLET, FILM COATED ORAL at 17:02

## 2021-05-02 RX ADMIN — ANTACID TABLETS 500 MG: 500 TABLET, CHEWABLE ORAL at 02:25

## 2021-05-02 RX ADMIN — RISPERIDONE 0.5 MG: 0.5 TABLET ORAL at 05:47

## 2021-05-02 RX ADMIN — GABAPENTIN 300 MG: 300 CAPSULE ORAL at 12:05

## 2021-05-02 RX ADMIN — OXYCODONE 5 MG: 5 TABLET ORAL at 03:41

## 2021-05-02 RX ADMIN — GABAPENTIN 300 MG: 300 CAPSULE ORAL at 05:47

## 2021-05-02 ASSESSMENT — PAIN DESCRIPTION - PAIN TYPE
TYPE: ACUTE PAIN
TYPE: ACUTE PAIN

## 2021-05-02 NOTE — CARE PLAN
Problem: Safety  Goal: Will remain free from injury  Outcome: PROGRESSING AS EXPECTED  Goal: Will remain free from falls  Description: Pt mobilizes frequently independently.   Outcome: PROGRESSING AS EXPECTED   Pt has telesitter in place for safety, risk for elopement.   Problem: Knowledge Deficit  Goal: Knowledge of disease process/condition, treatment plan, diagnostic tests, and medications will improve  Outcome: PROGRESSING AS EXPECTED  Goal: Knowledge of the prescribed therapeutic regimen will improve  Outcome: PROGRESSING AS EXPECTED   Pt reoriented to plan of care for shift.

## 2021-05-02 NOTE — PROGRESS NOTES
Pharmacy Pharmacotherapy Consult for LOS >30 days    Admit Date: 8/7/2020      Medications were reviewed for appropriateness and ongoing need.     Current Facility-Administered Medications   Medication Dose Route Frequency Provider Last Rate Last Admin   • calcium carbonate (TUMS) chewable tab 500 mg  500 mg Oral 4X/DAY PRN Jeovanny Dmitry, A.P.R.N.   500 mg at 05/02/21 0225   • ibuprofen (MOTRIN) tablet 600 mg  600 mg Oral Q6HRS PRN Jeovanny Simmonsnk, A.P.R.N.   600 mg at 04/12/21 2351   • oxyCODONE immediate-release (ROXICODONE) tablet 5 mg  5 mg Oral Q6HRS PRN Geraldene R Ralleca-Llaguno, A.P.R.N.   5 mg at 05/02/21 0341   • traZODone (DESYREL) tablet 50 mg  50 mg Oral QHS Geraldene R Ralleca-Llaguno, A.P.R.N.   50 mg at 05/01/21 2002   • senna-docusate (PERICOLACE or SENOKOT S) 8.6-50 MG per tablet 2 tablet  2 tablet Oral BID Geraldene R Ralleca-Llaguno, A.P.R.N.   2 tablet at 05/01/21 1721   • lidocaine (XYLOCAINE) 2 % viscous solution 30 mL  30 mL Topical QDAY PRN Geraldene R Ralleca-Llaguno, A.P.R.N.       • methocarbamol (ROBAXIN) tablet 500 mg  500 mg Oral 4X/DAY Geraldene R Ralleca-Llaguno, A.P.R.N.   500 mg at 05/02/21 1205   • morphine ER (MS CONTIN) tablet 15 mg  15 mg Oral Q12HRS Geraldene R Ralleca-Llaguno, A.P.R.N.   15 mg at 05/02/21 0547   • lidocaine (XYLOCAINE) 4 % topical solution   Topical QDAY PRN Geraldene R Ralleca-Llaguno, A.P.R.N.   20 mL at 04/02/21 1016   • gabapentin (NEURONTIN) capsule 300 mg  300 mg Oral TID Geraldene R Ralleca-Llaguno, A.P.R.N.   300 mg at 05/02/21 1205   • cyclobenzaprine (Flexeril) tablet 10 mg  10 mg Oral TID PRN Geraldene ANETTE Girard-Elvauno, A.P.R.N.   10 mg at 05/01/21 0237   • lidocaine (LIDODERM) 5 % 1 Patch  1 Patch Transdermal Q24HR Geraldene ANETTE Giarrd-Elvauno, A.P.R.N.   1 Patch at 05/02/21 1205   • hydrOXYzine HCl (ATARAX) tablet 25 mg  25 mg Oral TID PRN Geraldene ANETTE Girard-Wilfredoo, A.P.R.N.   25 mg at 04/30/21 2114   • risperiDONE (RISPERDAL) tablet 0.5 mg  0.5 mg  Oral DAILY Geraldene R Ralleca-Llaguno, A.P.R.N.   0.5 mg at 05/02/21 0547   • risperiDONE (RISPERDAL) tablet 1 mg  1 mg Oral Q EVENING Geraldene R Ralleca-Llaguno, A.P.R.N.   1 mg at 05/01/21 1721   • divalproex (DEPAKOTE SPRINKLE) capsule 125 mg  125 mg Oral Q8HRS Geraldene R Ralleca-Llaguno, A.P.R.N.   125 mg at 05/02/21 1205   • amLODIPine (NORVASC) tablet 5 mg  5 mg Oral Q DAY Geraldene R Ralleca-Llaguno, A.P.R.N.   5 mg at 05/02/21 0547       Recommendations:  No recommendations at this time.    Deanna Arce, ChynaD, BCPS

## 2021-05-02 NOTE — PROGRESS NOTES
Pt is A &Ox1. Pt is resting in bed, no signs of labored breathing or pain. Pt on RA. Call light & personal belongings within reach, bed in lowest position & locked, telesitter in place. Fall precautions in place and education provided on how to use call light. Pt updated on plan of care for the shift. Pt declines any additional needs at this time.

## 2021-05-02 NOTE — CARE PLAN
Problem: Safety  Goal: Will remain free from injury  Outcome: PROGRESSING AS EXPECTED  Goal: Will remain free from falls  Description: Pt mobilizes frequently independently.   Note: Tele sitter in place.     Problem: Pain Management  Goal: Pain level will decrease to patient's comfort goal  Outcome: PROGRESSING AS EXPECTED  Note: Use PRN oxycodone and flexeril per MAR.     Problem: Psychosocial Needs:  Goal: Level of anxiety will decrease  Outcome: PROGRESSING AS EXPECTED  Note: Reorient pt when needed. Provide distraction and redirection.

## 2021-05-02 NOTE — DISCHARGE SUMMARY
Discharge Summary    CHIEF COMPLAINT ON ADMISSION  Chief Complaint   Patient presents with   • Wound Infection     LLE. Seeping noted. Encrusted gauze and sock at site. Significant skin breakdown, redness and swelling noted.        Reason for Admission  Wound Check     Admission Date  8/7/2020    CODE STATUS  Full Code    HPI & HOSPITAL COURSE  Mr. Varela is a 65-year-old male with a past medical history of alcohol dependence, tobacco dependence, psychiatric disorder, and HTN who presented to the emergency department on 8/7/2020 with a left lower extremity wound infection. He was evaluated by orthopedic surgery who recommended ongoing wound care. Wound cultures at that time grew MSSA and Streptococcus. He had been seen at [facility] earlier and was prescribed antibiotics, but never filled the prescription.  An ultrasound of that extremity was done and was negative for DVT.  He was treated for sepsis secondary to cellulitis and completed an antibiotic course on 9/16/2020.  He underwent multiple bedside debridements and aggressive wound care.  Over admission he was also found to have head lice and underwent treatment for that.      A repeat wound culture was done on 9/28/2020 and grew Strep A and Proteus. Infectious disease was consulted and recommended a 5-day course of IV zosyn which was completed on 10/5/2020. On 10/12/2020 the wound care team noticed increased inflammation to the proximal part of the wound bed and switched from a vera flow wound VAC to a regular wound VAC.  ID was again consulted and on 10/14/2020 recommended a 7-day course of antibiotics with meropenem which was completed on 10/22/2020.  On 11/19/2020 he underwent left lower extremity debridement with wound VAC placement.  He continues to undergo wound care and has wound VAC in place.  His wound appears to be healing appropriately.  He will require ongoing wound care outside the hospital.    Additionally, he was noted to have  intermittent agitation secondary to underlying psychiatric disorder and was started on risperdal, depakote, and gabapentin per psychiatry recommendations.  He has remained stable but has been deemed incapacitated to make medical decisions so guardianship was pursued and granted on 1/29/21.  He is now appropriate for group home placement with home health ordered for wound care and wound VAC management.  He is safe for discharge at this time.      Therefore, he is discharged in good and stable condition to home with organized home healthcare and close outpatient follow-up.    The patient met 2-midnight criteria for an inpatient stay at the time of discharge.    Discharge Date  5/3/2021    FOLLOW UP ITEMS POST DISCHARGE  -Continue wound care through home health.     DISCHARGE DIAGNOSES  Principal Problem:    Wound of left leg POA: Yes  Active Problems:    Encephalopathy, unspecified POA: Yes    Psychiatric disorder POA: Yes    Essential hypertension POA: Yes    Flexion contracture of knee, left POA: Yes    Protein malnutrition (HCC) POA: Yes    Emphysema/COPD (HCC) POA: Yes  Resolved Problems:    Tobacco dependence POA: Yes    Infection of wound due to methicillin resistant Staphylococcus aureus (MRSA) POA: Yes    Hypokalemia POA: Yes    Hypomagnesemia POA: Unknown    Sepsis (HCC) POA: Yes    Hyponatremia POA: Yes    Alcohol dependence (HCC) POA: Yes    Non-compliance POA: Unknown    Pediculosis capitis POA: Unknown    Acute pain POA: Unknown    Nausea POA: Unknown      FOLLOW UP    [facility]  [address] [facility] [phone]          MEDICATIONS ON DISCHARGE     Medication List      START taking these medications      Instructions   amLODIPine 5 MG Tabs  Commonly known as: NORVASC   Take 1 tablet by mouth every day.  Dose: 5 mg     cyclobenzaprine 10 mg Tabs  Commonly known as: Flexeril   Take 1 tablet by mouth 3 times a day as needed for Muscle Spasms.  Dose: 10 mg     divalproex 125 MG  Csdr  Commonly known as: DEPAKOTE SPRINKLE   Take 1 capsule by mouth every 8 hours.  Dose: 125 mg     gabapentin 300 MG Caps  Commonly known as: NEURONTIN   Take 1 capsule by mouth 3 times a day.  Dose: 300 mg     hydrOXYzine HCl 25 MG Tabs  Commonly known as: ATARAX   Take 1 tablet by mouth 3 times a day as needed for Itching or Anxiety.  Dose: 25 mg     lidocaine 5 % Ptch  Commonly known as: LIDODERM   Place 1 Patch on the skin every 24 hours.  Dose: 1 Patch     methocarbamol 500 MG Tabs  Commonly known as: ROBAXIN   Take 1 tablet by mouth 4 times a day.  Dose: 500 mg     morphine ER 15 MG Tbcr tablet  Commonly known as: MS CONTIN   Take 1 tablet by mouth every 12 hours for 30 days.  Dose: 15 mg     oxyCODONE immediate-release 5 MG Tabs  Commonly known as: ROXICODONE   Take 1 tablet by mouth every 6 hours as needed for up to 5 days.  Dose: 5 mg     * risperiDONE 1 MG Tabs  Commonly known as: RISPERDAL   Take 1 tablet by mouth every evening.  Dose: 1 mg     * risperiDONE 0.5 MG Tabs  Commonly known as: RISPERDAL   Take 1 tablet by mouth every day.  Dose: 0.5 mg     senna-docusate 8.6-50 MG Tabs  Commonly known as: PERICOLACE or SENOKOT S   Take 2 Tablets by mouth 2 times a day.  Dose: 2 tablet     traZODone 50 MG Tabs  Commonly known as: DESYREL   Take 1 tablet by mouth at bedtime.  Dose: 50 mg         * This list has 2 medication(s) that are the same as other medications prescribed for you. Read the directions carefully, and ask your doctor or other care provider to review them with you.                Allergies  No Known Allergies    DIET  Orders Placed This Encounter   Procedures   • Diet Order Diet: Regular; Tray Modifications (optional): No Sharps (Paperware)     Standing Status:   Standing     Number of Occurrences:   1     Order Specific Question:   Diet:     Answer:   Regular [1]     Order Specific Question:   Tray Modifications (optional)     Answer:   No Sharps (Paperware)       ACTIVITY  As  tolerated.  Weight bearing as tolerated    CONSULTATIONS  ID  Psychiatry  Limb preservation services  Orthopedic surgery    PROCEDURES  Bedside excisional debridement-9/1/2020; 9/11/2020; 9/18/2020  Irrigation and debridement of multiple compartments of left leg with wound VAC placement-11//19/020    LABORATORY  Lab Results   Component Value Date    SODIUM 137 04/12/2021    POTASSIUM 4.0 04/12/2021    CHLORIDE 102 04/12/2021    CO2 25 04/12/2021    GLUCOSE 98 04/12/2021    BUN 12 04/12/2021    CREATININE 0.76 04/12/2021        Lab Results   Component Value Date    WBC 7.1 04/13/2021    HEMOGLOBIN 11.2 (L) 04/13/2021    HEMATOCRIT 34.8 (L) 04/13/2021    PLATELETCT 336 04/13/2021        Total time of the discharge process 36 minutes.

## 2021-05-02 NOTE — PROGRESS NOTES
Pt AOx 1, 8/10 pain generalized, on RA, and up self.  Medicated with oxycodone per MAR for pain. Wound vac in place running at 150 mmHg and dressing CDI. Telesitter in place for impulsivity.

## 2021-05-03 ENCOUNTER — PATIENT OUTREACH (OUTPATIENT)
Dept: HEALTH INFORMATION MANAGEMENT | Facility: OTHER | Age: 66
End: 2021-05-03

## 2021-05-03 ENCOUNTER — PHARMACY VISIT (OUTPATIENT)
Dept: PHARMACY | Facility: MEDICAL CENTER | Age: 66
End: 2021-05-03
Payer: MEDICARE

## 2021-05-03 VITALS
TEMPERATURE: 98.6 F | SYSTOLIC BLOOD PRESSURE: 102 MMHG | HEART RATE: 78 BPM | HEIGHT: 71 IN | RESPIRATION RATE: 16 BRPM | WEIGHT: 154.32 LBS | BODY MASS INDEX: 21.6 KG/M2 | OXYGEN SATURATION: 93 % | DIASTOLIC BLOOD PRESSURE: 77 MMHG

## 2021-05-03 PROCEDURE — A9270 NON-COVERED ITEM OR SERVICE: HCPCS | Performed by: NURSE PRACTITIONER

## 2021-05-03 PROCEDURE — 700102 HCHG RX REV CODE 250 W/ 637 OVERRIDE(OP): Performed by: NURSE PRACTITIONER

## 2021-05-03 PROCEDURE — 97597 DBRDMT OPN WND 1ST 20 CM/<: CPT

## 2021-05-03 PROCEDURE — 302098 PASTE RING (FLAT): Performed by: NURSE PRACTITIONER

## 2021-05-03 PROCEDURE — 97605 NEG PRS WND THER DME<=50SQCM: CPT

## 2021-05-03 PROCEDURE — 99239 HOSP IP/OBS DSCHRG MGMT >30: CPT | Performed by: INTERNAL MEDICINE

## 2021-05-03 RX ADMIN — METHOCARBAMOL TABLETS 500 MG: 500 TABLET, COATED ORAL at 11:38

## 2021-05-03 RX ADMIN — GABAPENTIN 300 MG: 300 CAPSULE ORAL at 06:33

## 2021-05-03 RX ADMIN — MORPHINE SULFATE 15 MG: 15 TABLET, FILM COATED, EXTENDED RELEASE ORAL at 06:33

## 2021-05-03 RX ADMIN — RISPERIDONE 0.5 MG: 0.5 TABLET ORAL at 06:33

## 2021-05-03 RX ADMIN — DIVALPROEX SODIUM 125 MG: 125 CAPSULE ORAL at 14:17

## 2021-05-03 RX ADMIN — DIVALPROEX SODIUM 125 MG: 125 CAPSULE ORAL at 06:33

## 2021-05-03 RX ADMIN — GABAPENTIN 300 MG: 300 CAPSULE ORAL at 11:38

## 2021-05-03 RX ADMIN — METHOCARBAMOL TABLETS 500 MG: 500 TABLET, COATED ORAL at 06:33

## 2021-05-03 RX ADMIN — DOCUSATE SODIUM 50 MG AND SENNOSIDES 8.6 MG 2 TABLET: 8.6; 5 TABLET, FILM COATED ORAL at 06:33

## 2021-05-03 ASSESSMENT — PAIN DESCRIPTION - PAIN TYPE
TYPE: ACUTE PAIN
TYPE: ACUTE PAIN

## 2021-05-03 NOTE — PROGRESS NOTES
Patient received at start of shift in bedside report. Patient AxOx2, only to self and place  With confusion to time and situation. Patient with vss this am, complaints of moderate pain/ soreness to LLE, no interventions per patient at this time. Fall and safety precautions in place. Patient with tele-sitter in place, ambulating in room independently with steady gait. Patient educated on need to call for help, not compliant with call light use. Will continue to monitor

## 2021-05-03 NOTE — PROGRESS NOTES
This RN spoke with case management, report called by Oly TBOAR. Patient to be discharged per MD order to group home personnel.

## 2021-05-03 NOTE — DISCHARGE INSTRUCTIONS
Wound Care, Adult  Taking care of your wound properly can help to prevent pain, infection, and scarring. It can also help your wound to heal more quickly.  How to care for your wound  Wound care         · Follow instructions from your health care provider about how to take care of your wound. Make sure you:  ? Wash your hands with soap and water before you change the bandage (dressing). If soap and water are not available, use hand .  ? Change your dressing as told by your health care provider.  ? Leave stitches (sutures), skin glue, or adhesive strips in place. These skin closures may need to stay in place for 2 weeks or longer. If adhesive strip edges start to loosen and curl up, you may trim the loose edges. Do not remove adhesive strips completely unless your health care provider tells you to do that.  · Check your wound area every day for signs of infection. Check for:  ? Redness, swelling, or pain.  ? Fluid or blood.  ? Warmth.  ? Pus or a bad smell.  · Ask your health care provider if you should clean the wound with mild soap and water. Doing this may include:  ? Using a clean towel to pat the wound dry after cleaning it. Do not rub or scrub the wound.  ? Applying a cream or ointment. Do this only as told by your health care provider.  ? Covering the incision with a clean dressing.  · Ask your health care provider when you can leave the wound uncovered.  · Keep the dressing dry until your health care provider says it can be removed. Do not take baths, swim, use a hot tub, or do anything that would put the wound underwater until your health care provider approves. Ask your health care provider if you can take showers. You may only be allowed to take sponge baths.  Medicines    · If you were prescribed an antibiotic medicine, cream, or ointment, take or use the antibiotic as told by your health care provider. Do not stop taking or using the antibiotic even if your condition improves.  · Take  over-the-counter and prescription medicines only as told by your health care provider. If you were prescribed pain medicine, take it 30 or more minutes before you do any wound care or as told by your health care provider.  General instructions  · Return to your normal activities as told by your health care provider. Ask your health care provider what activities are safe.  · Do not scratch or pick at the wound.  · Do not use any products that contain nicotine or tobacco, such as cigarettes and e-cigarettes. These may delay wound healing. If you need help quitting, ask your health care provider.  · Keep all follow-up visits as told by your health care provider. This is important.  · Eat a diet that includes protein, vitamin A, vitamin C, and other nutrient-rich foods to help the wound heal.  ? Foods rich in protein include meat, dairy, beans, nuts, and other sources.  ? Foods rich in vitamin A include carrots and dark green, leafy vegetables.  ? Foods rich in vitamin C include citrus, tomatoes, and other fruits and vegetables.  ? Nutrient-rich foods have protein, carbohydrates, fat, vitamins, or minerals. Eat a variety of healthy foods including vegetables, fruits, and whole grains.  Contact a health care provider if:  · You received a tetanus shot and you have swelling, severe pain, redness, or bleeding at the injection site.  · Your pain is not controlled with medicine.  · You have redness, swelling, or pain around the wound.  · You have fluid or blood coming from the wound.  · Your wound feels warm to the touch.  · You have pus or a bad smell coming from the wound.  · You have a fever or chills.  · You are nauseous or you vomit.  · You are dizzy.  Get help right away if:  · You have a red streak going away from your wound.  · The edges of the wound open up and separate.  · Your wound is bleeding, and the bleeding does not stop with gentle pressure.  · You have a rash.  · You faint.  · You have trouble  breathing.  Summary  · Always wash your hands with soap and water before changing your bandage (dressing).  · To help with healing, eat foods that are rich in protein, vitamin A, vitamin C, and other nutrients.  · Check your wound every day for signs of infection. Contact your health care provider if you suspect that your wound is infected.  This information is not intended to replace advice given to you by your health care provider. Make sure you discuss any questions you have with your health care provider.  Document Released: 09/26/2009 Document Revised: 04/06/2020 Document Reviewed: 07/04/2017  [employer] Patient Education © 2020 [employer]      Discharge Instructions    Discharged to group home by medical transportation with escort. Discharged via wheelchair, hospital escort: Yes.  Special equipment needed: Wound VAC    Be sure to schedule a follow-up appointment with your primary care doctor or any specialists as instructed.     Discharge Plan:   Diet Plan: Discussed  Activity Level: Discussed  Smoking Cessation Offered: Patient Refused  Confirmed Follow up Appointment: Appointment Scheduled  Confirmed Symptoms Management: Discussed  Medication Reconciliation Updated: Yes  Influenza Vaccine Indication: Indicated: 65 years and older  Influenza Vaccine Given - only chart on this line when given: Influenza Vaccine Given (See MAR)    I understand that a diet low in cholesterol, fat, and sodium is recommended for good health. Unless I have been given specific instructions below for another diet, I accept this instruction as my diet prescription.   Other diet: Regular Diet    Special Instructions: None    · Is patient discharged on Warfarin / Coumadin?   No     Depression / Suicide Risk    As you are discharged from this [facility], it is important to learn how to keep safe from harming yourself.    Recognize the warning signs:  · Abrupt changes in personality, positive or negative- including increase  in energy   · Giving away possessions  · Change in eating patterns- significant weight changes-  positive or negative  · Change in sleeping patterns- unable to sleep or sleeping all the time   · Unwillingness or inability to communicate  · Depression  · Unusual sadness, discouragement and loneliness  · Talk of wanting to die  · Neglect of personal appearance   · Rebelliousness- reckless behavior  · Withdrawal from people/activities they love  · Confusion- inability to concentrate     If you or a loved one observes any of these behaviors or has concerns about self-harm, here's what you can do:  · Talk about it- your feelings and reasons for harming yourself  · Remove any means that you might use to hurt yourself (examples: pills, rope, extension cords, firearm)  · Get professional help from the community (Mental Health, Substance Abuse, psychological counseling)  · Do not be alone:Call your Safe Contact- someone whom you trust who will be there for you.  · Call your local CRISIS HOTLINE 351-6739 or 064-980-3842  · Call your local Children's Mobile Crisis Response Team [place] (023) 035-0538 or www.[employer]  · Call the toll free National Suicide Prevention Hotlines   · National Suicide Prevention Lifeline 545-382-CNKF (4728)  · National Hope Line Network 800-SUICIDE (947-1639)

## 2021-05-03 NOTE — PROGRESS NOTES
Per MD order, patient discharged to [facility]. Guido picked up patient, patient stable upon discharge, VSS, no s/s of discomfort or distress. Patient left with all belongings, patient's wound vac, and medications via pharmacy meds-to-beds

## 2021-05-03 NOTE — PROGRESS NOTES
At approx 2000 pt became very agitated, pacing in room and hitting himself in the head repeatedly.    RN was able to calm Pt and administer his meds, he did however refuse his 2200 Depakote.    RADHA

## 2021-05-03 NOTE — CARE PLAN
Problem: Safety  Goal: Will remain free from injury  Outcome: PROGRESSING AS EXPECTED  Goal: Will remain free from falls  Description: Pt mobilizes frequently independently.   Outcome: PROGRESSING AS EXPECTED     Problem: Infection  Goal: Will remain free from infection  Outcome: PROGRESSING AS EXPECTED     Problem: Fluid Volume:  Goal: Will maintain balanced intake and output  Outcome: PROGRESSING AS EXPECTED     Problem: Pain Management  Goal: Pain level will decrease to patient's comfort goal  Outcome: PROGRESSING AS EXPECTED     Problem: Mobility  Goal: Risk for activity intolerance will decrease  Outcome: PROGRESSING AS EXPECTED     Problem: Psychosocial Needs:  Goal: Level of anxiety will decrease  Outcome: PROGRESSING SLOWER THAN EXPECTED

## 2021-05-03 NOTE — WOUND TEAM
[facility]  Inpatient Services  Wound and Skin Care Progress Note    Admission Date: 8/7/2020     Last order of IP CONSULT TO WOUND CARE was found on 9/1/2020 from Hospital Encounter on 8/7/2020     HPI, PMH, SH: Reviewed    Unit where seen by Wound Team: [id number]    WOUND CONSULT/FOLLOW UP RELATED TO:  Left leg scheduled negative pressure wound therapy (npwt) dressing change and 2 layer compression wrap change.    WOUND TYPE, LOCATION, CHARACTERISTICS (Pressure Injuries: location, stage, POA or date identified)      Negative Pressure Wound Therapy 11/20/20 Leg Lateral;Posterior;Medial Left (Active)   Vacuum Serial Number [id number] 04/30/21 2100   NPWT Pump Mode / Pressure Setting Ulta;Continuous;150 mmHg    Dressing Type Medium;Black Foam (Regular)    Number of Foam Pieces Used 4    Canister Changed No    Output (mL) 10 mL    NEXT Dressing Change/Treatment Date 05/05/21    WOUND NURSE ONLY - Time Spent with Patient (mins) 60      Wound 11/19/20 Full Thickness Wound Leg Lateral;Posterior;Medial Left (Active)   Wound Image      05/03/21 0800   Site Assessment Red;Granulation tissue    Periwound Assessment Clean;Dry;Intact    Margins Attached edges;Defined edges    Closure Secondary intention;Adhesive bandage    Drainage Amount Small    Drainage Description Serosanguineous    Treatments Cleansed;Site care;CSWD - Conservative Sharp Wound Debridement;Compression;Offloading    Wound Cleansing Approved Wound Cleanser    Periwound Protectant Skin Protectant Wipes to Periwound;Benzoin;Paste Ring;Drape    Dressing Cleansing/Solutions Not Applicable    Dressing Options Wound Vac    Dressing Changed Changed    Dressing Status Clean;Dry;Intact    Dressing Change/Treatment Frequency Monday, Wednesday, Friday, and As Needed    NEXT Dressing Change/Treatment Date 05/05/21    NEXT Weekly Photo (Inpatient Only) 05/10/21    Non-staged Wound Description Full thickness    Wound Length (cm) 11.7 cm    Wound Width (cm) 4  cm    Wound Depth (cm) 0.1 cm    Wound Surface Area (cm^2) 46.8 cm^2    Wound Volume (cm^3) 4.68 cm^3    Post-Procedure Length (cm) 15 cm    Post-Procedure Width (cm) 3.4 cm    Post-Procedure Depth (cm) 0.2 cm    Post-Procedure Surface Area (cm^2) 51 cm^2    Post-Procedure Volume (cm^3) 10.2 cm^3    Wound Healing % 54    Wound Bed Granulation (%) 100 %    Wound Bed Slough (%) 10 %    Wound Bed Granulation (%) - Post-Procedure 100 %    Tunneling (cm) 0 cm    Undermining (cm) 0 cm    Shape Irregular x2    Wound Odor Mild;Foul    Pulses Left;2+;DP;PT    Exposed Structures None    WOUND NURSE ONLY - Time Spent with Patient (mins) 75       Measurements:   LLE medial 9.3cm x 2.6cm x 0.1cm  LLE posterior 11.7cm x 4cm x 0.1cm      Lab Values:    Lab Results   Component Value Date/Time    WBC 7.1 04/13/2021 03:45 AM    RBC 4.20 (L) 04/13/2021 03:45 AM    HEMOGLOBIN 11.2 (L) 04/13/2021 03:45 AM    HEMATOCRIT 34.8 (L) 04/13/2021 03:45 AM    CREACTPROT 1.07 (H) 08/12/2020 01:55 PM    SEDRATEWES 85 (H) 08/07/2020 01:20 PM    HBA1C 5.7 (H) 11/09/2018 06:30 PM      Culture Results show:  Recent Results (from the past 720 hour(s))   CULTURE WOUND W/ GRAM STAIN    Collection Time: 09/01/20  2:00 PM    Specimen: Left Leg; Wound   Result Value Ref Range    Significant Indicator POS (POS)     Source WND     Site LEFT LEG     Culture Result - (A)     Gram Stain Result       Moderate Gram positive cocci.  Moderate Gram positive rods.      Culture Result (A)      Beta Hemolytic Streptococcus group A  Moderate growth      Culture Result (A)      Methicillin Resistant Staphylococcus aureus  Moderate growth      Culture Result (A)      Diphtheroids  Moderate growth  Mixed morphologies.     KOSTAS: 9/20/21   Left.    Doppler waveforms of the common femoral artery are of high amplitude and    triphasic.    Doppler waveforms at the ankle are brisk and triphasic.    Ankle-brachial index is normal.    Unable to obtained popliteal waveforms due to  wound bandages.     Self Report / Pain Level: Pre-medicated with PO oxycodone     INTERVENTIONS BY WOUND TEAM: INTERVENTIONS BY WOUND TEAM:  Performed standard wound care which includes appropriate positioning, dressing removal and non-selective debridement. Pictures and measurements obtained weekly if/when required.    Cleansed: Wound cleanser and gauze used to clean wound beds  Debridement: Lateral: Slough and non viable tissue debrided away using curette.  <20 cm2 debrided. Small bleeding noted, controlled with manual pressure.  Periwound: Cleansed with cleanser and gauze. Prepped with no sting skin prep/benzoin and paste rings (2.5 per side); Drape applied along thigh as bridge.  Primary:   Medial: Full thickness spiraled black foam into wound bed and continued up to anterior thigh as bridge for trac pad.  Lateral: Full thickness spiraled black foam into wound bed, 2nd piece up to lateral anterior thigh as bridge for trac pad. 3rd piece of half thickness circular black foam as button.   TRAC pad applied to each wound bed and Y-sited. Suction at 150mmHg. Fenestrated mepilex x2 around each vac tubing.   Secondary -  2 layer compression wrap applied. mepilex to secure tubing    Interdisciplinary consultation: Patient, Bedside RN, Wound RN (Sapna)    EVALUATION / RATIONALE FOR TREATMENT:  5/3/21: Lateral wound with some yellow slough and foul odor noted. Cleansed and debrided. Medial closing well with granular tissue present. Continued with regular vac and compression to thigh.     4/30/21: Vanessa applied to both wound beds following debridement to facilitate wound contraction. Superior edge to medial wound bed nearly closed. Tentative plan for discharge still on Monday May 3rd, however no time has been set as of this time.   4/28/21: Patient discharging on Monday May 3rd tentatively according to discharge plan.  Will plan to replace wound VAC in the AM for discharge during the day.   4/26/21: Collagen restarted as  wound bed debrided by this wound care RN.  Patient pending discharge within this week.  Outpatient wound care referral in place for outpatient wound care and home health to change wound VAC.  Possible to restart biologic placement to left LE medial and posterior lateral wounds to expedite healing and closure of wounds.   4/23/21: No agata applied during this dressing change. Wound beds with scant amount of biofilm, had mild odor. Continue NPWT and 2 layer compression. Can apply AGATA at next change.   4/21: agata applied today as no biologic was placed last time. Wounds remain stable.Was asked to check lateral leg for pressure injury. No evidence of pressure injury to medial or lateral leg.  4/19/2021 wounds decreasing in size, pink and granular tissue continue NPWT and two layer compression.   4/16/21:  Medial wound still decreasing in size, less progress on the lateral wound. Foul odor continues to be present, now concentrated in vac cannister, no other overt signs of infection.  Consider continuing with agata with next vac change to encourage tissue granulation.   04/14/21:  No biologic to be applied this week due to approval issues.  No change in wound.   4/12/21: Medial wound appears to have decreased in size. Biologic being applied by LPS APRN weekly. Per LPS will reapply Biologic on Friday 4/16/21.   4/9: debridement and biologic placement performed by LPS APN. Adaptic to remain in place during next change.  4/7: Biologic to be reapplied this week by LPS with possible debridement if deemed appropriate.  Wounds remain stable and appear to be to surface level. Y connect on tracpad still in use.   4/4/21: Received call pt had pulled trac pad off. Whole dressing changed. Y connected wounds with 2 separate trac pads to each wound to decrease risk of loss of NPWT. Bridged lateral trac pad up along thigh to keep out of compressed area to ease replacement if pt pulls off and making it more difficult for pt to  dislodge.   4/2/2021 - Debridement performed by APRN as patient's wounds continue to fail to contract. Biologic in place to bilateral wound beds and will be applied by RAFITA Goldberg again next Friday 4/11/2021. During next vac change, adaptic to remain in place as it is protecting the biologic underneath.   3/30/21: Wound odor mild-moderate and wound bed bright red, now 100% granulation tissue.  Meredith-skin continues to be fragile/flakey; protected with hydrocolloid dressing to wound border.    3/26/21: Foul odor present at dressing removal, but significantly decreased after cleansing.  Continue current POC.  3/24/21 Wound bed red, with less drainage noted.  Strong, foul odor still present.  APRN to see pt regarding wound bed. Continue with current plan of care.   3/22/21: Area unchanged from previous dressing change.  Wound malodorous.  Meredith-skin dry and flaky at superior wound border, hydocolloid thin dressing placed.   3/13/21 Wounds failing to contract.  Will trial intermittent wound vac setting to encourage greater stimulation of cellular activity in wound bed.    3/10/2021: debrided wound, odor present, continue NPWT  3/6/12 area unchanged, odor persists  3/1/21: Lidocaine unavailable during this dressing change. Small amount of debridement completed with curette. Continued with silver powder for its antimicrobial properties.   2/27/21 skin bridge enlarging  2/24/2021: Excisional debridement by LPS APRN performed for rolled edges that were starting to form along the posterior and medial left leg wound. Moderate bleeding managed by administration of lidocaine injection. CSWD performed for slough. Wound bed is beefy red following debridement. Resume agata and silver foam to manage bioburden. Continue with CSWD with each dressing change.   2/22/2021: Rolled edges starting to form along posterior thigh wound bed - may require excisional debridement by provider. Minimal changes to actual wound bed. Continue CSWD,  agata, and silver foam to manage bioburden.   2/19/21: minimal to no changes from last assessment. Continue CSWD, agata, and silver foam for bioburden.       Goals: Steady decrease in wound area and depth weekly.    NURSING PLAN OF CARE ORDERS (X):    Dressing changes: See Dressing Care orders: X  Skin care: See Skin Care orders:   Rectal tube care: See Rectal Tube Care orders:   Other orders:      WOUND TEAM PLAN OF CARE:   Dressing changes by wound team:        Follow up 3 times weekly:                NPWT change 3 times weekly:  X,  NPWT changes 3x/ weekly with 2 layer compression wrap.  Follow up 1-2 times weekly:      Follow up Bi-Monthly:                   Follow up as needed:       Other (explain):     Anticipated discharge plans:    LTACH:        SNF/Rehab:       Home Health Care:           Outpatient Wound Center:            Self Care:           Other- [facility], [facility] with home health and outpatient wound care for biologic placement.

## 2021-05-03 NOTE — PROGRESS NOTES
Report received. Assessment complete.    AAOx1; sometimes agitated and hits himself in the head repeatedly; can be redirected. Anxiety medication given with positive results. Refuses meds at times.    Patient does not call appropriately; tele sitter in use.     Pt denies pain. Will continue to monitor.     Pt denies N/V, SOB, or chest pain.    GATES, ambulatory x standby assist.    + void, LBM undetermined.    Wound vac in place to LLE, draining scant brown / serosang fluid.    Pt is tolerating reg diet.       Plan of care discussed with patient. Fall precautions in place. Belongings and call light within reach. Educated patient to call for assistance as needed. All needs met at this time.

## 2021-05-03 NOTE — DISCHARGE PLANNING
Anticipated Discharge Disposition: Group Home    Action: JOAQUÍN spoke with Peterson at [facility]. Peterson gave this CM a new fax number ([phone]) for COVID results; CM faxed results. Peterson notified CM pt will be picked up between 2546-4700 this afternoon at [facility]. JOAQUÍN notified Aletha Grady (guardian) of pt readiness to discharge. Discharge summary sent to Aletha per request.   Bedside RN to coordinate Meds to Beds delivery prior to .     Barriers to Discharge: None    Plan: Discharge to group home at 1430.

## 2021-05-03 NOTE — CARE PLAN
Problem: Safety  Goal: Will remain free from falls  Description: Pt mobilizes frequently independently.   Outcome: PROGRESSING AS EXPECTED  Patient free from falls this shift. Patient AxOx2, alert only to self and place. Patient ambulating in the room with steady gait, fall and safety precautions in place. Patient refusing bed alarm and wrist band, tele-sitter in place. Patient rounded on frequently, will continue to monitor      Problem: Pain Management  Goal: Pain level will decrease to patient's comfort goal  Outcome: PROGRESSING AS EXPECTED  Patient with complaints of mild-moderate pain to left leg, no intervention at this time. prn mediations available will continue to monitor

## 2021-05-18 ENCOUNTER — NON-PROVIDER VISIT (OUTPATIENT)
Dept: WOUND CARE | Facility: MEDICAL CENTER | Age: 66
End: 2021-05-18
Attending: NURSE PRACTITIONER
Payer: MEDICARE

## 2021-05-18 ENCOUNTER — HOSPITAL ENCOUNTER (OUTPATIENT)
Facility: MEDICAL CENTER | Age: 66
End: 2021-05-18
Attending: NURSE PRACTITIONER
Payer: MEDICARE

## 2021-05-18 DIAGNOSIS — S81.802A OPEN LEG WOUND, LEFT, INITIAL ENCOUNTER: Primary | ICD-10-CM

## 2021-05-18 DIAGNOSIS — S81.802A OPEN LEG WOUND, LEFT, INITIAL ENCOUNTER: ICD-10-CM

## 2021-05-18 PROCEDURE — 99211 OFF/OP EST MAY X REQ PHY/QHP: CPT

## 2021-05-18 PROCEDURE — 87186 SC STD MICRODIL/AGAR DIL: CPT

## 2021-05-18 PROCEDURE — 87070 CULTURE OTHR SPECIMN AEROBIC: CPT

## 2021-05-18 PROCEDURE — 87077 CULTURE AEROBIC IDENTIFY: CPT | Mod: 91

## 2021-05-18 PROCEDURE — 97605 NEG PRS WND THER DME<=50SQCM: CPT

## 2021-05-18 PROCEDURE — 87205 SMEAR GRAM STAIN: CPT

## 2021-05-18 NOTE — CERTIFICATION
Non Provider Encounter- Lower Extremity Ulcer    HISTORY OF PRESENT ILLNESS  Wound History:    START OF CARE IN CLINIC: 05/18/2021    REFERRING PROVIDER: Asaf ELLER    WOUND ETIOLOGY: trauma   LOCATION: L posterior knee, L medial LE proximal, L medial knee, L medial LE distal   HISTORY: Pt was dc'd from hospital to a group home assisted living facility. He is accompanied to clinic today by SANDEEP Pepe. He has [place]  currently on service for wound care. From Hospitalist BECCA note on 05/03/2021 - Patient is a 65-year-old male with a past medical history of alcohol dependence, tobacco dependence, psychiatric disorder, and HTN who presented to the emergency department on 8/7/2020 with a left lower extremity wound infection. He was evaluated by orthopedic surgery who recommended ongoing wound care. Wound cultures at that time grew MSSA and Streptococcus. He had been seen at [facility] earlier and was prescribed antibiotics, but never filled the prescription.  An ultrasound of that extremity was done and was negative for DVT.  He was treated for sepsis secondary to cellulitis and completed an antibiotic course on 9/16/2020.  He underwent multiple bedside debridements and aggressive wound care.  Over admission he was also found to have head lice and underwent treatment for that.       A repeat wound culture was done on 9/28/2020 and grew Strep A and Proteus. Infectious disease was consulted and recommended a 5-day course of IV zosyn which was completed on 10/5/2020. On 10/12/2020 the wound care team noticed increased inflammation to the proximal part of the wound bed and switched from a vera flow wound VAC to a regular wound VAC.  ID was again consulted and on 10/14/2020 recommended a 7-day course of antibiotics with meropenem which was completed on 10/22/2020.  On 11/19/2020 he underwent left lower extremity debridement with wound VAC placement.  He continues to undergo wound care and has wound VAC in  place.  His wound appears to be healing appropriately.  He will require ongoing wound care outside the hospital.     Additionally, he was noted to have intermittent agitation secondary to underlying psychiatric disorder and was started on risperdal, depakote, and gabapentin per psychiatry recommendations.  He has remained stable but has been deemed incapacitated to make medical decisions so guardianship was pursued and granted on 21.  He is now appropriate for group home placement with home health ordered for wound care and wound VAC management.  He is safe for discharge at this time.          TOBACCO USE: 1 pack/day cigarettes     FALL RISK ASSESSMENT:    X  65 years or older     X   Fall within the last 2 years   Uses ambulatory devices  Loss of protective sensation in feet   Use of prostethic/orthotic    Presence of lower extremity/foot/toe amputation   Taking medication that increases risk (per facility policy)    Interventions Recommended (if any of the above are selected):     Use of Assistive Device:   X Supervision with ambulation: Caregiver       Assistance with ambulation: Caregiver   X  Home safety education: Educational material provided    MOST RECENT VASCULAR STUDIES:     Vascular Laboratory   Conclusions   1.  Normal bilateral KOSTAS's.      GRUPO GANN      Age:    64    Gender:     M      MRN:    [id number]      :    1955      BSA:      Exam Date:     2020 09:59      Room #:     Inpatient      Priority:     Routine      Ht (in):             Wt (lb):      Ordering Physician:        EDDA CANADA      Referring Physician:       [id number] Morrissey      Sonographer:               Deja Ellis RDMS                               T      Study Type:                Complete Bilateral      Technical Quality:         Adequate      Indications:     Ulceration of LE      CPT Codes:       01148      ICD Codes:        707.1      History:         Nonhealing wound of left lower extremity.      Limitations:                     RIGHT      Waveform            Systolic BPs (mmHg)                              117           Brachial   Triphasic                                Common Femoral   Triphasic                  138           Posterior Tibial   Triphasic                  132           Dorsalis Pedis                                            Peroneal                              1.18          KOSTAS                                            TBI                           LEFT   Waveform        Systolic BPs (mmHg)                              114           Brachial   Triphasic                                Common Femoral   Triphasic                  127           Posterior Tibial   Triphasic                  122           Dorsalis Pedis                                            Peroneal                              1.09          KOSTAS                                            TBI         Findings   Right.    Doppler waveforms of the common femoral artery are of high amplitude and    triphasic.    Doppler waveforms at the ankle are brisk and triphasic.    Ankle-brachial index is normal.       Left.    Doppler waveforms of the common femoral artery are of high amplitude and    triphasic.    Doppler waveforms at the ankle are brisk and triphasic.    Ankle-brachial index is normal.    Unable to obtained popliteal waveforms due to wound bandages.      Additional testing was not performed in accordance with lower extremity    arterial evaluation protocol.                Clover Maya   (Electronically Signed)   Final Date:      02 September 2020                     11:14    05/18/2021 - In clinic today, pt/dp/popliteal pulses are multiphasic with doppler      Patient allergies and medications reviewed via Epic.     WOUND ASSESSMENT        Negative Pressure Wound Therapy 11/20/20 Leg Lateral;Posterior;Medial Left (Active)           Wound  11/19/20 Full Thickness Wound Leg Lateral;Posterior;Medial Left (Active)       Wound 05/18/21 Traumatic Knee Posterior Left L posterior knee/popliteal FTW (Active)   Wound Image   05/18/21 1700   Site Assessment Red;Yellow 05/18/21 1700   Periwound Assessment Intact;Dry 05/18/21 1700   Margins Attached edges 05/18/21 1700   Closure Secondary intention 05/18/21 1700   Drainage Amount Small 05/18/21 1700   Drainage Description Serosanguineous 05/18/21 1700   Treatments Cleansed 05/18/21 1700   Wound Cleansing Approved Wound Cleanser 05/18/21 1700   Periwound Protectant Benzoin;Drape 05/18/21 1700   Dressing Cleansing/Solutions Normal Saline 05/18/21 1700   Dressing Options Wound Vac;Vac Drape 05/18/21 1700   Dressing Changed Changed 05/18/21 1700   Dressing Status Clean;Dry;Intact 05/18/21 1700   Dressing Change/Treatment Frequency Monday, Wednesday, Friday, and As Needed 05/18/21 1700   Non-staged Wound Description Full thickness 05/18/21 1700   Wound Length (cm) 13 cm 05/18/21 1700   Wound Width (cm) 3.5 cm 05/18/21 1700   Wound Depth (cm) 0.2 cm 05/18/21 1700   Wound Surface Area (cm^2) 45.5 cm^2 05/18/21 1700   Wound Volume (cm^3) 9.1 cm^3 05/18/21 1700   Wound Bed Granulation (%) 90 % 05/18/21 1700   Wound Bed Slough (%) 10 % 05/18/21 1700   Tunneling (cm) 0 cm 05/18/21 1700   Undermining (cm) 0 cm 05/18/21 1700   Wound Odor None 05/18/21 1700   Pulses 2+;DP;PT;Doppler 05/18/21 1700   Exposed Structures None 05/18/21 1700       Wound 05/18/21 Pressure Injury Leg Medial;Proximal Left L medial LE proximally, stage 2 PI from VAC tubing (Active)   Wound Image     05/18/21 1700   Site Assessment Red 05/18/21 1700   Periwound Assessment Dry;Intact 05/18/21 1700   Margins Attached edges 05/18/21 1700   Closure Secondary intention 05/18/21 1700   Drainage Amount Small 05/18/21 1700   Drainage Description Serosanguineous 05/18/21 1700   Treatments Cleansed 05/18/21 1700   Wound Cleansing Approved Wound Cleanser 05/18/21  1700   Periwound Protectant Barrier Paste;Skin Protectant Wipes to Periwound 05/18/21 1700   Dressing Cleansing/Solutions Normal Saline 05/18/21 1700   Dressing Options Hydrofera Blue Ready;Silicone Adhesive Foam 05/18/21 1700   Dressing Changed New 05/18/21 1700   Dressing Status Clean;Dry;Intact 05/18/21 1700   Dressing Change/Treatment Frequency Every 72 hrs, and As Needed 05/18/21 1700   Pressure Injury Stage 2 05/18/21 1700   Wound Length (cm) 0.6 cm 05/18/21 1700   Wound Width (cm) 0.6 cm 05/18/21 1700   Wound Depth (cm) 0.1 cm 05/18/21 1700   Wound Surface Area (cm^2) 0.36 cm^2 05/18/21 1700   Wound Volume (cm^3) 0.04 cm^3 05/18/21 1700   Wound Bed Granulation (%) 100 % 05/18/21 1700   Tunneling (cm) 0 cm 05/18/21 1700   Undermining (cm) 0 cm 05/18/21 1700   Wound Odor None 05/18/21 1700   Exposed Structures None 05/18/21 1700       Wound 05/18/21 Traumatic Leg Medial Left L medial leg/knee, mid FTW (Active)   Wound Image   05/18/21 1700   Site Assessment Red 05/18/21 1700   Periwound Assessment Dry;Intact;Scar tissue 05/18/21 1700   Margins Attached edges 05/18/21 1700   Closure Secondary intention 05/18/21 1700   Drainage Amount Small 05/18/21 1700   Drainage Description Serosanguineous 05/18/21 1700   Treatments Cleansed 05/18/21 1700   Wound Cleansing Approved Wound Cleanser 05/18/21 1700   Periwound Protectant Barrier Paste;Skin Protectant Wipes to Periwound 05/18/21 1700   Dressing Cleansing/Solutions Normal Saline 05/18/21 1700   Dressing Options Hydrofera Blue Ready;Silicone Adhesive Foam;Tubigrip 05/18/21 1700   Dressing Changed New 05/18/21 1700   Dressing Status Clean;Intact;Dry 05/18/21 1700   Dressing Change/Treatment Frequency Every 72 hrs, and As Needed 05/18/21 1700   Non-staged Wound Description Full thickness 05/18/21 1700   Wound Length (cm) 3 cm 05/18/21 1700   Wound Width (cm) 0.5 cm 05/18/21 1700   Wound Depth (cm) 0.1 cm 05/18/21 1700   Wound Surface Area (cm^2) 1.5 cm^2 05/18/21 1700    Wound Volume (cm^3) 0.15 cm^3 05/18/21 1700   Wound Bed Granulation (%) 100 % 05/18/21 1700   Tunneling (cm) 0 cm 05/18/21 1700   Undermining (cm) 0 cm 05/18/21 1700   Wound Odor None 05/18/21 1700   Exposed Structures None 05/18/21 1700       Wound 05/18/21 Traumatic Leg Medial;Distal Left FTW (Active)   Wound Image   05/18/21 1700   Site Assessment Red 05/18/21 1700   Periwound Assessment Dry;Intact 05/18/21 1700   Margins Attached edges 05/18/21 1700   Closure Secondary intention 05/18/21 1700   Drainage Amount Small 05/18/21 1700   Drainage Description Serosanguineous 05/18/21 1700   Treatments Cleansed 05/18/21 1700   Wound Cleansing Approved Wound Cleanser 05/18/21 1700   Periwound Protectant Benzoin;Drape 05/18/21 1700   Dressing Cleansing/Solutions Normal Saline 05/18/21 1700   Dressing Options Vac Drape;Wound Vac;Tubigrip 05/18/21 1700   Dressing Changed Changed 05/18/21 1700   Dressing Status Clean;Dry;Intact 05/18/21 1700   Dressing Change/Treatment Frequency Monday, Wednesday, Friday, and As Needed 05/18/21 1700   Non-staged Wound Description Full thickness 05/18/21 1700   Wound Length (cm) 8.5 cm 05/18/21 1700   Wound Width (cm) 2.5 cm 05/18/21 1700   Wound Depth (cm) 0.2 cm 05/18/21 1700   Wound Surface Area (cm^2) 21.25 cm^2 05/18/21 1700   Wound Volume (cm^3) 4.25 cm^3 05/18/21 1700   Wound Bed Granulation (%) 95 % 05/18/21 1700   Wound Bed Slough (%) 5 % 05/18/21 1700   Tunneling (cm) 0 cm 05/18/21 1700   Undermining (cm) 0 cm 05/18/21 1700   Wound Odor None 05/18/21 1700   Pulses 2+;DP;PT;Doppler 05/18/21 1700   Exposed Structures None 05/18/21 1700            -Refer to flowsheet for wound care details.       After Visit Summary Wound Care Instructions    Nutrition - Patient instructed increased protein diet unless contraindicated in renal failure (meat, eggs, fish, yogurt, cottage cheese, beans), use of multivitamin with minerals and Arginaid supplementation (check if ok with Primary Care  Provider first).    Infection -  instructed signs and symptoms of infection, increased redness and swelling, localized heat over wound and surrounding area/fever/chills/nausea and vomiting, when to call doctor or go to Emergency Room.     Dressing Changes - Instructed patient rationale for wound care products. Instructed to keep dressings clean and dry, shower on clinic days right before coming in. Change your dressing if it becomes soiled, soaked, or falls off.    instructed patient about fall prevention.      Compression Wraps - Avoid prolonged standing or sitting without elevating your legs.  Apply tubigrip to your legs ending 2 fingers below back of knee without wrinkles.   Remove your compression wrap if you have severe pain, severe swelling, numbness, color change, or temperature change in your toes. If you need to remove your compression wrap, do so by unrolling it. Do not cut the compression wrap off to prevent cutting yourself on accident.    Wound VAC may not have any drainage in tube or canister & it will still be working.   Change canister if it does become full by pressing tab on side of machine to remove canister and snap on new one. Full canister can be thrown in the trash. If canister fills with bright red blood, turn machine off and go to Emergency Room immediately. Dressing will be changed every Monday, Wednesday and Friday at the wound clinic.  If you are having issues with your wound VAC, please consider patching leaks, changing the canister, or calling [phone] for troubleshooting. If the wound VAC has been off or un-operational for over 2 hours, call wound care center to inform them and remove all dressings including black foam and replace with normal saline damp gauze, then dry gauze over that, secure with tape.     Questions - should you have any questions regarding your home care instructions, please contact the wound center at [phone]. If after hours, contact your primary care  physician or go to the hospital emergency room.

## 2021-05-19 LAB
GRAM STN SPEC: NORMAL
SIGNIFICANT IND 70042: NORMAL
SITE SITE: NORMAL
SOURCE SOURCE: NORMAL

## 2021-05-19 NOTE — PROCEDURES
Wounds were non selectively debrided with gauze to remove biofilm. NPWT VAC dressing was changed, with one piece of black foam to cover each wound bed and bridge wounds together,  (1 black total ). Pump set to neg 125mmhg continuous, no leaks noted.

## 2021-05-19 NOTE — PATIENT INSTRUCTIONS
After Visit Summary Wound Care Instructions    Nutrition - Patient instructed increased protein diet unless contraindicated in renal failure (meat, eggs, fish, yogurt, cottage cheese, beans), use of multivitamin with minerals and Arginaid supplementation (check if ok with Primary Care Provider first).    Infection -  instructed signs and symptoms of infection, increased redness and swelling, localized heat over wound and surrounding area/fever/chills/nausea and vomiting, when to call doctor or go to Emergency Room.     Dressing Changes - Instructed patient rationale for wound care products. Instructed to keep dressings clean and dry, shower on clinic days right before coming in. Change your dressing if it becomes soiled, soaked, or falls off.    instructed patient about fall prevention.      Compression Wraps - Avoid prolonged standing or sitting without elevating your legs.  Apply tubigrip to your legs ending 2 fingers below back of knee without wrinkles.   Remove your compression wrap if you have severe pain, severe swelling, numbness, color change, or temperature change in your toes. If you need to remove your compression wrap, do so by unrolling it. Do not cut the compression wrap off to prevent cutting yourself on accident.    Wound VAC may not have any drainage in tube or canister & it will still be working.   Change canister if it does become full by pressing tab on side of machine to remove canister and snap on new one. Full canister can be thrown in the trash. If canister fills with bright red blood, turn machine off and go to Emergency Room immediately. Dressing will be changed every Monday, Wednesday and Friday at the wound clinic.  If you are having issues with your wound VAC, please consider patching leaks, changing the canister, or calling [phone] for troubleshooting. If the wound VAC has been off or un-operational for over 2 hours, call wound care center to inform them and remove all  dressings including black foam and replace with normal saline damp gauze, then dry gauze over that, secure with tape.     Questions - should you have any questions regarding your home care instructions, please contact the wound center at [phone]. If after hours, contact your primary care physician or go to the hospital emergency room.

## 2021-05-20 ENCOUNTER — HOSPITAL ENCOUNTER (OUTPATIENT)
Facility: MEDICAL CENTER | Age: 66
End: 2021-05-20
Attending: PHYSICIAN ASSISTANT
Payer: MEDICARE

## 2021-05-20 LAB
ALBUMIN SERPL BCP-MCNC: 3.6 G/DL (ref 3.2–4.9)
ALBUMIN/GLOB SERPL: 1 G/DL
ALP SERPL-CCNC: 81 U/L (ref 30–99)
ALT SERPL-CCNC: 10 U/L (ref 2–50)
ANION GAP SERPL CALC-SCNC: 11 MMOL/L (ref 7–16)
AST SERPL-CCNC: 16 U/L (ref 12–45)
BASOPHILS # BLD AUTO: 1.4 % (ref 0–1.8)
BASOPHILS # BLD: 0.07 K/UL (ref 0–0.12)
BILIRUB SERPL-MCNC: <0.2 MG/DL (ref 0.1–1.5)
BUN SERPL-MCNC: 13 MG/DL (ref 8–22)
CALCIUM SERPL-MCNC: 9.3 MG/DL (ref 8.5–10.5)
CHLORIDE SERPL-SCNC: 106 MMOL/L (ref 96–112)
CO2 SERPL-SCNC: 27 MMOL/L (ref 20–33)
CREAT SERPL-MCNC: 0.9 MG/DL (ref 0.5–1.4)
EOSINOPHIL # BLD AUTO: 0.21 K/UL (ref 0–0.51)
EOSINOPHIL NFR BLD: 4.3 % (ref 0–6.9)
ERYTHROCYTE [DISTWIDTH] IN BLOOD BY AUTOMATED COUNT: 48.8 FL (ref 35.9–50)
ERYTHROCYTE [SEDIMENTATION RATE] IN BLOOD BY WESTERGREN METHOD: 29 MM/HOUR (ref 0–20)
GLOBULIN SER CALC-MCNC: 3.5 G/DL (ref 1.9–3.5)
GLUCOSE SERPL-MCNC: 84 MG/DL (ref 65–99)
HCT VFR BLD AUTO: 40 % (ref 42–52)
HGB BLD-MCNC: 12.3 G/DL (ref 14–18)
IMM GRANULOCYTES # BLD AUTO: 0.01 K/UL (ref 0–0.11)
IMM GRANULOCYTES NFR BLD AUTO: 0.2 % (ref 0–0.9)
LYMPHOCYTES # BLD AUTO: 1.57 K/UL (ref 1–4.8)
LYMPHOCYTES NFR BLD: 31.8 % (ref 22–41)
MCH RBC QN AUTO: 26.9 PG (ref 27–33)
MCHC RBC AUTO-ENTMCNC: 30.8 G/DL (ref 33.7–35.3)
MCV RBC AUTO: 87.5 FL (ref 81.4–97.8)
MONOCYTES # BLD AUTO: 0.59 K/UL (ref 0–0.85)
MONOCYTES NFR BLD AUTO: 11.9 % (ref 0–13.4)
NEUTROPHILS # BLD AUTO: 2.49 K/UL (ref 1.82–7.42)
NEUTROPHILS NFR BLD: 50.4 % (ref 44–72)
NRBC # BLD AUTO: 0 K/UL
NRBC BLD-RTO: 0 /100 WBC
PLATELET # BLD AUTO: 423 K/UL (ref 164–446)
PMV BLD AUTO: 10 FL (ref 9–12.9)
POTASSIUM SERPL-SCNC: 4.6 MMOL/L (ref 3.6–5.5)
PROT SERPL-MCNC: 7.1 G/DL (ref 6–8.2)
RBC # BLD AUTO: 4.57 M/UL (ref 4.7–6.1)
SODIUM SERPL-SCNC: 144 MMOL/L (ref 135–145)
TSH SERPL DL<=0.005 MIU/L-ACNC: 1.59 UIU/ML (ref 0.38–5.33)
WBC # BLD AUTO: 4.9 K/UL (ref 4.8–10.8)

## 2021-05-20 PROCEDURE — 85025 COMPLETE CBC W/AUTO DIFF WBC: CPT

## 2021-05-20 PROCEDURE — 84443 ASSAY THYROID STIM HORMONE: CPT

## 2021-05-20 PROCEDURE — 80053 COMPREHEN METABOLIC PANEL: CPT

## 2021-05-20 PROCEDURE — 85652 RBC SED RATE AUTOMATED: CPT

## 2021-06-04 ENCOUNTER — OFFICE VISIT (OUTPATIENT)
Dept: WOUND CARE | Facility: MEDICAL CENTER | Age: 66
End: 2021-06-04
Attending: NURSE PRACTITIONER
Payer: MEDICARE

## 2021-06-04 VITALS
OXYGEN SATURATION: 94 % | TEMPERATURE: 97.4 F | DIASTOLIC BLOOD PRESSURE: 61 MMHG | SYSTOLIC BLOOD PRESSURE: 116 MMHG | HEART RATE: 61 BPM | RESPIRATION RATE: 16 BRPM

## 2021-06-04 DIAGNOSIS — S81.802D WOUND OF LEFT LOWER EXTREMITY, SUBSEQUENT ENCOUNTER: Primary | ICD-10-CM

## 2021-06-04 DIAGNOSIS — R41.89 COGNITIVE IMPAIRMENT: ICD-10-CM

## 2021-06-04 DIAGNOSIS — R52 PAIN: ICD-10-CM

## 2021-06-04 PROCEDURE — 97605 NEG PRS WND THER DME<=50SQCM: CPT

## 2021-06-04 PROCEDURE — 99213 OFFICE O/P EST LOW 20 MIN: CPT | Mod: 25 | Performed by: NURSE PRACTITIONER

## 2021-06-04 PROCEDURE — 15271 SKIN SUB GRAFT TRNK/ARM/LEG: CPT | Performed by: NURSE PRACTITIONER

## 2021-06-04 PROCEDURE — 11042 DBRDMT SUBQ TIS 1ST 20SQCM/<: CPT

## 2021-06-04 PROCEDURE — 15271 SKIN SUB GRAFT TRNK/ARM/LEG: CPT

## 2021-06-04 PROCEDURE — 99213 OFFICE O/P EST LOW 20 MIN: CPT | Mod: 25

## 2021-06-04 ASSESSMENT — ENCOUNTER SYMPTOMS
SENSORY CHANGE: 0
SHORTNESS OF BREATH: 0
WEAKNESS: 0
DIZZINESS: 0
FALLS: 0
SPUTUM PRODUCTION: 0
DEPRESSION: 0
COUGH: 0
SINUS PAIN: 0
FEVER: 0
VOMITING: 0
NERVOUS/ANXIOUS: 0
PALPITATIONS: 0
NAUSEA: 0
CHILLS: 0
DIARRHEA: 0
SORE THROAT: 0
MYALGIAS: 0
HEADACHES: 0

## 2021-06-04 ASSESSMENT — LIFESTYLE VARIABLES: SUBSTANCE_ABUSE: 0

## 2021-06-04 NOTE — PROGRESS NOTES
Provider Encounter- Lower Extremity Ulcer      HISTORY OF PRESENT ILLNESS  Wound History:    START OF CARE IN CLINIC: 5/18/2021    REFERRING PROVIDER:  RAFITA Barker       WOUND ETIOLOGY: Trauma   LOCATION: left medial lower extremity, left posterior knee.    HISTORY:  Patient is a 65-year-old male with a past medical history of alcohol dependence, tobacco dependence, psychiatric disorder, and HTN who presented to the emergency department on 8/7/2020 with a left lower extremity wound infection. He was evaluated by orthopedic surgery who recommended ongoing wound care. Wound cultures at that time grew MSSA and Streptococcus. He had been seen at [facility] earlier and was prescribed antibiotics, but never filled the prescription.  An ultrasound of that extremity was done and was negative for DVT.  He was treated for sepsis secondary to cellulitis and completed an antibiotic course on 9/16/2020.  He underwent multiple bedside debridements and aggressive wound care.  Over admission he was also found to have head lice and underwent treatment for that.       A repeat wound culture was done on 9/28/2020 and grew Strep A and Proteus. Infectious disease was consulted and recommended a 5-day course of IV zosyn which was completed on 10/5/2020. On 10/12/2020 the wound care team noticed increased inflammation to the proximal part of the wound bed and switched from a vera flow wound VAC to a regular wound VAC.  ID was again consulted and on 10/14/2020 recommended a 7-day course of antibiotics with meropenem which was completed on 10/22/2020.  On 11/19/2020 he underwent left lower extremity debridement with wound VAC placement.  He continues to undergo wound care and has wound VAC in place.  His wound appears to be healing appropriately.  He will require ongoing wound care outside the hospital.     Additionally, he was noted to have intermittent agitation secondary to underlying psychiatric disorder and was  started on risperdal, depakote, and gabapentin per psychiatry recommendations.  He has remained stable but has been deemed incapacitated to make medical decisions so guardianship was pursued and granted on 1/29/21.  He is now appropriate for group home placement with home health ordered for wound care and wound VAC management.  He was discharged to group home on 5/3/2021 home health coming by and doing wound VAC changes 2 times per week.  However, upon interviewing, patient states that he is living at the homeless shelter.        Pertinent Medical History:    VASCULAR HISTORY:  alcohol dependence, tobacco dependence, psychiatric disorder, and HTN  Compression: two layer compression  Varicose Veins: None        TOBACCO USE:  Former smoker    Patient's problem list, allergies, and current medications reviewed and updated in Epic    Interval History:  6/4/2021: Initial clinic visit with RAFITA Nelson.  Patient reports he is living in a homeless shelter and home health nurse change his dressings 2 times per week.  He denies fever, chills, nausea, vomiting, chest pain, headache, shortness of breath.  He states that he thinks his wounds appear to be improving.      REVIEW OF SYSTEMS:   Review of Systems   Constitutional: Negative for chills, fever and malaise/fatigue.   HENT: Negative for congestion, sinus pain and sore throat.    Respiratory: Negative for cough, sputum production and shortness of breath.    Cardiovascular: Negative for chest pain, palpitations and leg swelling.   Gastrointestinal: Negative for diarrhea, nausea and vomiting.   Musculoskeletal: Negative for falls and myalgias.   Skin: Negative for itching and rash.   Neurological: Negative for dizziness, sensory change, weakness and headaches.   Psychiatric/Behavioral: Negative for depression and substance abuse. The patient is not nervous/anxious.      PHYSICAL EXAMINATION:   /61   Pulse 61   Temp 36.3 °C (97.4 °F) (Temporal)   Resp 16    SpO2 94%     Physical Exam  Constitutional:       Appearance: Normal appearance. He is normal weight.      Comments: Well-kept, thin elderly male.   HENT:      Head: Normocephalic and atraumatic.      Nose: Nose normal.   Eyes:      General:         Right eye: No discharge.         Left eye: No discharge.      Extraocular Movements: Extraocular movements intact.      Pupils: Pupils are equal, round, and reactive to light.   Cardiovascular:      Rate and Rhythm: Normal rate and regular rhythm.   Pulmonary:      Effort: Pulmonary effort is normal.      Comments: Respirations even and unlabored, even chest rise and fall  Musculoskeletal:         General: No swelling or tenderness. Normal range of motion.      Cervical back: Normal range of motion. No rigidity.   Skin:     General: Skin is warm and dry.      Findings: No erythema or rash.      Comments: Full-thickness wound to left medial lower leg and left posterior knee.  As are attached, P granular tissue noted.    See flowsheet and photos for further details   Neurological:      Mental Status: He is alert and oriented to person, place, and time. Mental status is at baseline.   Psychiatric:         Mood and Affect: Mood normal.         Behavior: Behavior normal.         WOUND ASSESSMENT      Negative Pressure Wound Therapy 11/20/20 Leg Lateral;Posterior;Medial Left (Active)           Wound 11/19/20 Full Thickness Wound Leg Lateral;Posterior;Medial Left (Active)       Wound 05/18/21 Traumatic Knee Posterior Left L posterior knee/popliteal FTW (Active)   Wound Image    06/04/21 1723   Site Assessment Red;Yellow 06/04/21 1723   Periwound Assessment Scar tissue 06/04/21 1723   Margins Attached edges 06/04/21 1723   Closure Secondary intention 05/18/21 1700   Drainage Amount Moderate 06/04/21 1723   Drainage Description Serosanguineous 06/04/21 1723   Treatments Cleansed;Topical Lidocaine;Provider debridement 06/04/21 1723   Wound Cleansing Normal Saline Irrigation  06/04/21 1723   Periwound Protectant Skin Protectant Wipes to Periwound;Drape 06/04/21 1723   Dressing Cleansing/Solutions Normal Saline 06/04/21 1723   Dressing Options Biologic (Skin Substitute);Adaptic;Steri-Strip;Wound Vac;Compression Wrap Two Layer 06/04/21 1723   Dressing Changed Changed 05/18/21 1700   Dressing Status Clean;Dry;Intact 05/18/21 1700   Dressing Change/Treatment Frequency Monday, Wednesday, Friday, and As Needed 05/18/21 1700   Non-staged Wound Description Full thickness 06/04/21 1723   Wound Length (cm) 11.6 cm 06/04/21 1723   Wound Width (cm) 1.7 cm 06/04/21 1723   Wound Depth (cm) 0.1 cm 06/04/21 1723   Wound Surface Area (cm^2) 19.72 cm^2 06/04/21 1723   Wound Volume (cm^3) 1.97 cm^3 06/04/21 1723   Post-Procedure Length (cm) 10.6 cm 06/04/21 1723   Post-Procedure Width (cm) 2.5 cm 06/04/21 1723   Post-Procedure Depth (cm) 0.1 cm 06/04/21 1723   Post-Procedure Surface Area (cm^2) 26.5 cm^2 06/04/21 1723   Post-Procedure Volume (cm^3) 2.65 cm^3 06/04/21 1723   Wound Healing % 78 06/04/21 1723   Wound Bed Granulation (%) 90 % 05/18/21 1700   Wound Bed Slough (%) 10 % 05/18/21 1700   Tunneling (cm) 0 cm 06/04/21 1723   Undermining (cm) 0 cm 06/04/21 1723   Wound Odor None 06/04/21 1723   Pulses 2+;DP;PT;Doppler 05/18/21 1700   Exposed Structures None 06/04/21 1723       Wound 05/18/21 Traumatic Leg Medial Left L medial leg/knee, mid FTW (Active)   Wound Image   06/04/21 1723   Site Assessment Red;Yellow 06/04/21 1723   Periwound Assessment Scar tissue 06/04/21 1723   Margins Attached edges 06/04/21 1723   Closure Secondary intention 05/18/21 1700   Drainage Amount Moderate 06/04/21 1723   Drainage Description Serosanguineous 06/04/21 1723   Treatments Cleansed;Topical Lidocaine 06/04/21 1723   Wound Cleansing Normal Saline Irrigation 06/04/21 1723   Periwound Protectant Skin Protectant Wipes to Periwound;Drape 06/04/21 1723   Dressing Cleansing/Solutions Not Applicable 06/04/21 1723   Dressing  Options Wound Vac;Compression Wrap Two Layer 06/04/21 1723   Dressing Changed New 05/18/21 1700   Dressing Status Clean;Intact;Dry 05/18/21 1700   Dressing Change/Treatment Frequency Every 72 hrs, and As Needed 05/18/21 1700   Non-staged Wound Description Full thickness 06/04/21 1723   Wound Length (cm) 0.6 cm 06/04/21 1723   Wound Width (cm) 0.4 cm 06/04/21 1723   Wound Depth (cm) 0 cm 06/04/21 1723   Wound Surface Area (cm^2) 0.24 cm^2 06/04/21 1723   Wound Volume (cm^3) 0 cm^3 06/04/21 1723   Wound Healing % 100 06/04/21 1723   Wound Bed Granulation (%) 100 % 05/18/21 1700   Tunneling (cm) 0 cm 06/04/21 1723   Undermining (cm) 0 cm 06/04/21 1723   Wound Odor None 06/04/21 1723   Exposed Structures None 06/04/21 1723       Wound 05/18/21 Traumatic Leg Medial;Distal Left FTW (Active)   Wound Image    06/04/21 1723   Site Assessment Red;Yellow 06/04/21 1723   Periwound Assessment Scar tissue 06/04/21 1723   Margins Attached edges 06/04/21 1723   Closure Secondary intention 05/18/21 1700   Drainage Amount Moderate 06/04/21 1723   Drainage Description Serosanguineous 06/04/21 1723   Treatments Cleansed;Topical Lidocaine;Provider debridement 06/04/21 1723   Wound Cleansing Normal Saline Irrigation 06/04/21 1723   Periwound Protectant Skin Protectant Wipes to Periwound;Drape 06/04/21 1723   Dressing Cleansing/Solutions Not Applicable 06/04/21 1723   Dressing Options Wound Vac;Compression Wrap Two Layer 06/04/21 1723   Dressing Changed Changed 05/18/21 1700   Dressing Status Clean;Dry;Intact 05/18/21 1700   Dressing Change/Treatment Frequency Monday, Wednesday, Friday, and As Needed 05/18/21 1700   Non-staged Wound Description Full thickness 06/04/21 1723   Wound Length (cm) 5.4 cm 06/04/21 1723   Wound Width (cm) 1 cm 06/04/21 1723   Wound Depth (cm) 0.1 cm 06/04/21 1723   Wound Surface Area (cm^2) 5.4 cm^2 06/04/21 1723   Wound Volume (cm^3) 0.54 cm^3 06/04/21 1723   Post-Procedure Length (cm) 5.5 cm 06/04/21 1723    Post-Procedure Width (cm) 1.1 cm 06/04/21 1723   Post-Procedure Depth (cm) 0.1 cm 06/04/21 1723   Post-Procedure Surface Area (cm^2) 6.05 cm^2 06/04/21 1723   Post-Procedure Volume (cm^3) 0.6 cm^3 06/04/21 1723   Wound Healing % 87 06/04/21 1723   Wound Bed Granulation (%) 95 % 05/18/21 1700   Wound Bed Slough (%) 5 % 05/18/21 1700   Tunneling (cm) 0 cm 06/04/21 1723   Undermining (cm) 0 cm 06/04/21 1723   Wound Odor None 06/04/21 1723   Pulses 2+;DP;PT;Doppler 05/18/21 1700   Exposed Structures None 06/04/21 1723            PROCEDURE:   -2% viscous lidocaine applied topically to wound bed for approximately 5 minutes prior to debridement  -Curette was used to debride wound bed.  Excisional debridement was performed to remove devitalized tissue until healthy, bleeding tissue was visualized.   Entire surface of wound of left posterior knee, 26.5cm², debrided.  Entire surface of wound of left medial lower extremity 5.64cm², debrided. Tissue debrided into the subcutaneous layer.  Epi fix was applied to left posterior knee wound only.  -Bleeding controlled with manual pressure  -Wound care completed per orders, by [facility] RN    EPIFIX APPLICATION:   6/4/2021: First application of epi fix mesh 4cm x 4.5cm. [id number], EXP 03/01/2026  REORDER # [id number]      Pertinent Labs and Diagnostics:    Labs:     IMAGING: No results found.    VASCULAR STUDIES: No results found.     LAST  WOUND CULTURE:   Lab Results   Component Value Date/Time    CULTRSULT Light growth usual skin lotus. (A) 05/18/2021 04:01 PM    CULTRSULT Proteus mirabilis  Moderate growth   (A) 05/18/2021 04:01 PM              ASSESSMENT AND PLAN:     1. Wound of left lower extremity, subsequent encounter    6/4/2021: Full-thickness wounds to left medial lower extremity and left posterior knee.  Authorization for epi fix received.  Product placed to left posterior knee wound.  -Excisional debridement of wound in clinic today, medically necessary  to promote wound healing.  -Patient to return to clinic weekly for assessment and debridement  -Home health to change dressing 1-2 times per week in between clinic visits.  -Continue to monitor for signs and symptoms of infection.    Wound care:   Left posterior knee: Epi fix, Adaptic with Steri-Strips, regular wound VAC.  2 layer compression.  Left medial lower extremity: Regular wound VAC, 2 layer compression.    2. Pain  6/4/2021: Patient has full sensation to bilateral lower extremities.   -2% viscous lidocaine applied topically to wound bed for approximately 5 minutes prior to debridement  -Patient tolerated procedure today with no complaints of discomfort.    3. Cognitive impairment  Comment: Patient has a history of intermittent agitation secondary to underlying psychiatric disorder.  Patient taking Risperdal, Depakote, gabapentin per psychiatric recommendations.  Patient is stable but has been deemed incapacitated to make medical decisions and has radiation granted as of 1/29/2021.  Patient is stable to discharge and was discharged to a group home with home health.  6/4/2021: Discussed with patient need for debridement, product placement, continuing wound VAC and compression.  Patient verbalizes understanding and rationale for treatment.             PATIENT EDUCATION  - Importance of managing edema for healing of ulcer, and for prevention of new ulcer development  -Need for lifelong compression of lower legs   -Elevate legs above the level of the heart periodically throughout the day.  - Importance of adequate nutrition for wound healing  -Advised to go to ER for any increased redness, swelling, drainage or odor, or if patient develops fever, chills, nausea or vomiting.    25 min spent face to face with patient, >50% of time spent counseling, coordinating care, reviewing records, discussing POC, educating patient regarding vascular disease, wound healing and progression. This time was spent in excess to  procedure time.       Please note that this note may have been created using voice recognition software. I have worked with technical experts from [facility] to optimize the interface.  I have made every reasonable attempt to correct obvious errors, but there may be errors of grammar and possibly

## 2021-06-10 ENCOUNTER — TELEPHONE (OUTPATIENT)
Dept: WOUND CARE | Facility: MEDICAL CENTER | Age: 66
End: 2021-06-10

## 2021-06-11 ENCOUNTER — OFFICE VISIT (OUTPATIENT)
Dept: WOUND CARE | Facility: MEDICAL CENTER | Age: 66
End: 2021-06-11
Attending: NURSE PRACTITIONER
Payer: MEDICARE

## 2021-06-11 VITALS
SYSTOLIC BLOOD PRESSURE: 117 MMHG | TEMPERATURE: 98.2 F | DIASTOLIC BLOOD PRESSURE: 83 MMHG | OXYGEN SATURATION: 97 % | HEART RATE: 84 BPM | RESPIRATION RATE: 18 BRPM

## 2021-06-11 DIAGNOSIS — S81.802D WOUND OF LEFT LOWER EXTREMITY, SUBSEQUENT ENCOUNTER: Primary | ICD-10-CM

## 2021-06-11 DIAGNOSIS — R52 PAIN: ICD-10-CM

## 2021-06-11 DIAGNOSIS — R41.89 COGNITIVE IMPAIRMENT: ICD-10-CM

## 2021-06-11 PROCEDURE — 15271 SKIN SUB GRAFT TRNK/ARM/LEG: CPT | Performed by: NURSE PRACTITIONER

## 2021-06-11 PROCEDURE — 15271 SKIN SUB GRAFT TRNK/ARM/LEG: CPT

## 2021-06-11 PROCEDURE — 99213 OFFICE O/P EST LOW 20 MIN: CPT

## 2021-06-11 PROCEDURE — 99213 OFFICE O/P EST LOW 20 MIN: CPT | Mod: 25 | Performed by: NURSE PRACTITIONER

## 2021-06-11 ASSESSMENT — ENCOUNTER SYMPTOMS
PALPITATIONS: 0
DEPRESSION: 0
FALLS: 0
SORE THROAT: 0
NERVOUS/ANXIOUS: 0
NAUSEA: 0
DIARRHEA: 0
MYALGIAS: 0
VOMITING: 0
SENSORY CHANGE: 0
FEVER: 0
DIZZINESS: 0
CHILLS: 0
SINUS PAIN: 0
SHORTNESS OF BREATH: 0
SPUTUM PRODUCTION: 0
HEADACHES: 0
WEAKNESS: 0
COUGH: 0

## 2021-06-11 ASSESSMENT — LIFESTYLE VARIABLES: SUBSTANCE_ABUSE: 0

## 2021-06-11 NOTE — PROGRESS NOTES
Provider Encounter- Lower Extremity Ulcer      HISTORY OF PRESENT ILLNESS  Wound History:    START OF CARE IN CLINIC: 5/18/2021    REFERRING PROVIDER:  RAFITA Barker       WOUND ETIOLOGY: Trauma   LOCATION: left medial lower extremity, left posterior knee.    HISTORY:  Patient is a 65-year-old male with a past medical history of alcohol dependence, tobacco dependence, psychiatric disorder, and HTN who presented to the emergency department on 8/7/2020 with a left lower extremity wound infection. He was evaluated by orthopedic surgery who recommended ongoing wound care. Wound cultures at that time grew MSSA and Streptococcus. He had been seen at [facility] earlier and was prescribed antibiotics, but never filled the prescription.  An ultrasound of that extremity was done and was negative for DVT.  He was treated for sepsis secondary to cellulitis and completed an antibiotic course on 9/16/2020.  He underwent multiple bedside debridements and aggressive wound care.  Over admission he was also found to have head lice and underwent treatment for that.       A repeat wound culture was done on 9/28/2020 and grew Strep A and Proteus. Infectious disease was consulted and recommended a 5-day course of IV zosyn which was completed on 10/5/2020. On 10/12/2020 the wound care team noticed increased inflammation to the proximal part of the wound bed and switched from a vera flow wound VAC to a regular wound VAC.  ID was again consulted and on 10/14/2020 recommended a 7-day course of antibiotics with meropenem which was completed on 10/22/2020.  On 11/19/2020 he underwent left lower extremity debridement with wound VAC placement.  He continues to undergo wound care and has wound VAC in place.  His wound appears to be healing appropriately.  He will require ongoing wound care outside the hospital.     Additionally, he was noted to have intermittent agitation secondary to underlying psychiatric disorder and was  started on risperdal, depakote, and gabapentin per psychiatry recommendations.  He has remained stable but has been deemed incapacitated to make medical decisions so guardianship was pursued and granted on 1/29/21.  He was deemed  appropriate for group home placement with home health ordered for wound care and wound VAC management.  He was discharged to group home on 5/3/2021 home health coming by and doing wound VAC changes 2 times per week.       Pertinent Medical History:    VASCULAR HISTORY:  alcohol dependence, tobacco dependence, psychiatric disorder, and HTN  Compression: two layer compression  Varicose Veins: None        TOBACCO USE:  Former smoker    Patient's problem list, allergies, and current medications reviewed and updated in Epic    Interval History:  6/4/2021: Initial clinic visit with RAFITA Nelson.  Patient reports he is living in a homeless shelter and home health nurse change his dressings 2 times per week.  He denies fever, chills, nausea, vomiting, chest pain, headache, shortness of breath.  He states that he thinks his wounds appear to be improving.      6/11/2021 : Clinic visit with RAFITA Rueda, FNP-BC, CWOCN, CFCN.  Bloa presents today accompanied by his caregiver.  His VAC malfunctioned, a new one has been ordered but not yet delivered.  He is currently living in a group home.  Home health is seeing him 2 times per week in between clinic visits.      REVIEW OF SYSTEMS:   Review of Systems   Constitutional: Negative for chills, fever and malaise/fatigue.   HENT: Negative for congestion, sinus pain and sore throat.    Respiratory: Negative for cough, sputum production and shortness of breath.    Cardiovascular: Negative for chest pain, palpitations and leg swelling.   Gastrointestinal: Negative for diarrhea, nausea and vomiting.   Musculoskeletal: Negative for falls and myalgias.   Skin: Negative for itching and rash.   Neurological: Negative for dizziness, sensory change,  weakness and headaches.   Psychiatric/Behavioral: Negative for depression and substance abuse. The patient is not nervous/anxious.      PHYSICAL EXAMINATION:   /83   Pulse 84   Temp 36.8 °C (98.2 °F) (Temporal)   Resp 18   SpO2 97%   Physical Exam  Constitutional:       Appearance: Normal appearance. He is normal weight.      Comments: Well-kept, thin elderly male.   HENT:      Head: Normocephalic and atraumatic.      Nose: Nose normal.   Eyes:      General:         Right eye: No discharge.         Left eye: No discharge.      Extraocular Movements: Extraocular movements intact.      Pupils: Pupils are equal, round, and reactive to light.   Cardiovascular:      Rate and Rhythm: Normal rate and regular rhythm.   Pulmonary:      Effort: Pulmonary effort is normal.      Comments: Respirations even and unlabored, even chest rise and fall  Musculoskeletal:         General: No swelling or tenderness. Normal range of motion.      Cervical back: Normal range of motion. No rigidity.   Skin:     General: Skin is warm and dry.      Findings: No erythema or rash.      Comments: Full-thickness wound to left medial lower leg and left posterior knee.  As are attached, P granular tissue noted.    See flowsheet and photos for further details   Neurological:      Mental Status: He is alert and oriented to person, place, and time. Mental status is at baseline.   Psychiatric:         Mood and Affect: Mood normal.         Behavior: Behavior normal.         WOUND ASSESSMENT                                                Negative Pressure Wound Therapy 11/20/20 Leg Lateral;Posterior;Medial Left (Active)           Wound 11/19/20 Full Thickness Wound Leg Lateral;Posterior;Medial Left (Active)       Wound 05/18/21 Traumatic Knee;Thigh Posterior Left --Left Popliteal Fossa/Posterior Thigh (Active)   Wound Image    06/11/21 1445   Site Assessment Red;Yellow 06/11/21 1445   Periwound Assessment Scar tissue 06/11/21 1445   Margins  Attached edges 06/11/21 1445   Closure Secondary intention 05/18/21 1700   Drainage Amount Moderate 06/11/21 1445   Drainage Description Serosanguineous 06/11/21 1445   Treatments Cleansed;Topical Lidocaine;Provider debridement 06/11/21 1445   Wound Cleansing Normal Saline Irrigation 06/11/21 1445   Periwound Protectant Skin Protectant Wipes to Periwound 06/11/21 1445   Dressing Cleansing/Solutions Normal Saline 06/11/21 1445   Dressing Options Biologic (Skin Substitute);Adaptic;Steri-Strip;Mepilex;Compression Wrap Two Layer 06/11/21 1445   Dressing Changed New 06/11/21 1445   Dressing Status Clean;Dry;Intact 05/18/21 1700   Dressing Change/Treatment Frequency Monday, Wednesday, Friday, and As Needed 06/11/21 1445   Non-staged Wound Description Full thickness 06/11/21 1445   Wound Length (cm) 10.5 cm 06/11/21 1445   Wound Width (cm) 3 cm 06/11/21 1445   Wound Depth (cm) 0.2 cm 06/11/21 1445   Wound Surface Area (cm^2) 31.5 cm^2 06/11/21 1445   Wound Volume (cm^3) 6.3 cm^3 06/11/21 1445   Post-Procedure Length (cm) 10.7 cm 06/11/21 1445   Post-Procedure Width (cm) 3 cm 06/11/21 1445   Post-Procedure Depth (cm) 0.2 cm 06/11/21 1445   Post-Procedure Surface Area (cm^2) 32.1 cm^2 06/11/21 1445   Post-Procedure Volume (cm^3) 6.42 cm^3 06/11/21 1445   Wound Healing % 31 06/11/21 1445   Wound Bed Granulation (%) 90 % 05/18/21 1700   Wound Bed Slough (%) 10 % 05/18/21 1700   Tunneling (cm) 0 cm 06/11/21 1445   Undermining (cm) 0 cm 06/11/21 1445   Wound Odor None 06/11/21 1445   Pulses 2+;DP;PT;Doppler 05/18/21 1700   Exposed Structures None 06/11/21 1445       Wound 05/18/21 Traumatic Leg Medial Left --Left Medial LE Superior Wound (Active)   Wound Image   06/11/21 1445   Site Assessment Red;Yellow 06/11/21 1445   Periwound Assessment Scar tissue 06/11/21 1445   Margins Attached edges 06/11/21 1445   Closure Secondary intention 05/18/21 1700   Drainage Amount Small 06/11/21 1445   Drainage Description Serosanguineous  06/11/21 1445   Treatments Cleansed 06/11/21 1445   Wound Cleansing Normal Saline Irrigation 06/11/21 1445   Periwound Protectant Skin Protectant Wipes to Periwound 06/11/21 1445   Dressing Cleansing/Solutions Not Applicable 06/11/21 1445   Dressing Options Hydrofiber Silver;Mepilex;Compression Wrap Two Layer 06/11/21 1445   Dressing Changed New 06/11/21 1445   Dressing Status Clean;Intact;Dry 05/18/21 1700   Dressing Change/Treatment Frequency Monday, Wednesday, Friday, and As Needed 06/11/21 1445   Non-staged Wound Description Full thickness 06/11/21 1445   Wound Length (cm) 0.3 cm 06/11/21 1445   Wound Width (cm) 0.2 cm 06/11/21 1445   Wound Depth (cm) 0.1 cm 06/11/21 1445   Wound Surface Area (cm^2) 0.06 cm^2 06/11/21 1445   Wound Volume (cm^3) 0.01 cm^3 06/11/21 1445   Wound Healing % 93 06/11/21 1445   Wound Bed Granulation (%) 100 % 05/18/21 1700   Tunneling (cm) 0 cm 06/11/21 1445   Undermining (cm) 0 cm 06/11/21 1445   Wound Odor None 06/11/21 1445   Exposed Structures None 06/11/21 1445       Wound 05/18/21 Traumatic Leg Medial;Distal Left --Left Medial LE Inferior Wound (Active)   Wound Image    06/11/21 1445   Site Assessment Red;Yellow 06/11/21 1445   Periwound Assessment Scar tissue 06/11/21 1445   Margins Attached edges 06/11/21 1445   Closure Secondary intention 05/18/21 1700   Drainage Amount Moderate 06/11/21 1445   Drainage Description Serosanguineous 06/11/21 1445   Treatments Cleansed;Topical Lidocaine;Provider debridement 06/11/21 1445   Wound Cleansing Normal Saline Irrigation 06/11/21 1445   Periwound Protectant Skin Protectant Wipes to Periwound 06/11/21 1445   Dressing Cleansing/Solutions Not Applicable 06/11/21 1445   Dressing Options Hydrofiber Silver;Mepilex;Compression Wrap Two Layer 06/11/21 1445   Dressing Changed New 06/11/21 1445   Dressing Status Clean;Dry;Intact 05/18/21 1700   Dressing Change/Treatment Frequency Monday, Wednesday, Friday, and As Needed 06/11/21 1445   Non-staged  Wound Description Full thickness 06/11/21 1445   Wound Length (cm) 5.1 cm 06/11/21 1445   Wound Width (cm) 1.4 cm 06/11/21 1445   Wound Depth (cm) 0.2 cm 06/11/21 1445   Wound Surface Area (cm^2) 7.14 cm^2 06/11/21 1445   Wound Volume (cm^3) 1.43 cm^3 06/11/21 1445   Post-Procedure Length (cm) 5.2 cm 06/11/21 1445   Post-Procedure Width (cm) 1.5 cm 06/11/21 1445   Post-Procedure Depth (cm) 0.2 cm 06/11/21 1445   Post-Procedure Surface Area (cm^2) 7.8 cm^2 06/11/21 1445   Post-Procedure Volume (cm^3) 1.56 cm^3 06/11/21 1445   Wound Healing % 66 06/11/21 1445   Wound Bed Granulation (%) 95 % 05/18/21 1700   Wound Bed Slough (%) 5 % 05/18/21 1700   Tunneling (cm) 0 cm 06/11/21 1445   Undermining (cm) 0 cm 06/11/21 1445   Wound Odor None 06/11/21 1445   Pulses 2+;DP;PT;Doppler 05/18/21 1700   Exposed Structures None 06/11/21 1445                     PROCEDURE:   -2% viscous lidocaine applied topically to wound beds for approximately 5 minutes prior to debridement  -Curette was used to debride wound beds.  Excisional debridement was performed to remove devitalized tissue until healthy, bleeding tissue was visualized.   Entire surface of wounds 39.96 cm² debrided, into the subcutaneous layer.    - Epi fix was applied to left posterior knee wound only.  -Bleeding controlled with manual pressure  -Wound care completed per orders, by [facility] RN    EPIFIX APPLICATION:   6/4/2021: First application of epi fix mesh 4cm x 4.5cm. [id number], EXP 03/01/2026  REORDER # [id number]    6/11/2021: Second application of epi fix mesh 4cm x 4.5cm. [id number], EXP 03/01/2026  REORDER # [id number]      Pertinent Labs and Diagnostics:    Labs:     IMAGING: No results found.    VASCULAR STUDIES: No results found.     LAST  WOUND CULTURE:   Lab Results   Component Value Date/Time    CULTRSULT Light growth usual skin lotus. (A) 05/18/2021 04:01 PM    CULTRSULT Proteus mirabilis  Moderate growth   (A) 05/18/2021 04:01 PM               ASSESSMENT AND PLAN:     1. Wound of left lower extremity, subsequent encounter    6/11/2021: Full-thickness wounds x2 to left medial lower extremity and left posterior knee, chronic.  Patient presents today without VAC, pump apparently malfunctioned and a replacement is pending.  -Excisional debridement of wound in clinic today, medically necessary to promote wound healing.  -Patient to return to clinic weekly for assessment and debridement  -Home health to change dressing 1-2 times per week in between clinic visits.  Restart VAC when new pump available  -Continue to monitor for signs and symptoms of infection.    Wound care:   Left posterior knee: Epi fix, Adaptic with Steri-Strips, .  2 layer compression.  Left medial lower extremity: , 2 layer compression.    2. Pain  6/11/2021: Patient has full sensation to bilateral lower extremities.   -2% viscous lidocaine applied topically to wound bed for approximately 5 minutes prior to debridement  -Patient tolerated procedure today with no complaints of discomfort.    3. Cognitive impairment  Comment: Patient has a history of intermittent agitation secondary to underlying psychiatric disorder.  Patient taking Risperdal, Depakote, gabapentin per psychiatric recommendations.  Patient is stable but has been deemed incapacitated to make medical decisions and has radiation granted as of 1/29/2021.  Patient is stable to discharge and was discharged to a group home with home health.    6/11/2021: Patient is calm and cooperative today, accompanied by caregiver.  Discussed with patient need for debridement, product placement, continuing wound VAC and compression.  Patient verbalizes understanding and rationale for treatment.             PATIENT EDUCATION  - Importance of managing edema for healing of ulcer, and for prevention of new ulcer development  -Need for lifelong compression of lower legs   -Elevate legs above the level of the heart periodically throughout the  day.  - Importance of adequate nutrition for wound healing  -Advised to go to ER for any increased redness, swelling, drainage or odor, or if patient develops fever, chills, nausea or vomiting.    20 min spent face to face with patient, >50% of time spent counseling, coordinating care, reviewing records, discussing POC, educating patient regarding vascular disease, wound healing and progression. This time was spent in excess to procedure time.       Please note that this note may have been created using voice recognition software. I have worked with technical experts from [employer] to optimize the interface.  I have made every reasonable attempt to correct obvious errors, but there may be errors of grammar and possibly

## 2021-06-11 NOTE — TELEPHONE ENCOUNTER
Emmy TOBAR from  called and said the wound vac was malfunctioning and [facility] She placed an advanced dressing on wound during visit.

## 2021-06-18 ENCOUNTER — OFFICE VISIT (OUTPATIENT)
Dept: WOUND CARE | Facility: MEDICAL CENTER | Age: 66
End: 2021-06-18
Attending: NURSE PRACTITIONER
Payer: MEDICARE

## 2021-06-18 VITALS
HEART RATE: 75 BPM | RESPIRATION RATE: 16 BRPM | SYSTOLIC BLOOD PRESSURE: 122 MMHG | TEMPERATURE: 97.5 F | DIASTOLIC BLOOD PRESSURE: 83 MMHG | OXYGEN SATURATION: 97 %

## 2021-06-18 DIAGNOSIS — T14.8XXA WOUND INFECTION: ICD-10-CM

## 2021-06-18 DIAGNOSIS — R73.03 PRE-DIABETES: ICD-10-CM

## 2021-06-18 DIAGNOSIS — S81.802A OPEN LEG WOUND, LEFT, INITIAL ENCOUNTER: ICD-10-CM

## 2021-06-18 DIAGNOSIS — M24.562 FLEXION CONTRACTURE OF KNEE, LEFT: ICD-10-CM

## 2021-06-18 DIAGNOSIS — F10.21 ALCOHOLISM IN REMISSION (HCC): ICD-10-CM

## 2021-06-18 DIAGNOSIS — R41.89 COGNITIVE IMPAIRMENT: ICD-10-CM

## 2021-06-18 DIAGNOSIS — L03.116 CELLULITIS OF LEFT LOWER EXTREMITY: ICD-10-CM

## 2021-06-18 DIAGNOSIS — S81.802D WOUND OF LEFT LOWER EXTREMITY, SUBSEQUENT ENCOUNTER: Primary | ICD-10-CM

## 2021-06-18 DIAGNOSIS — L08.9 WOUND INFECTION: ICD-10-CM

## 2021-06-18 DIAGNOSIS — R52 PAIN: ICD-10-CM

## 2021-06-18 PROCEDURE — 11042 DBRDMT SUBQ TIS 1ST 20SQCM/<: CPT

## 2021-06-18 PROCEDURE — 99214 OFFICE O/P EST MOD 30 MIN: CPT | Mod: 25

## 2021-06-18 PROCEDURE — 11045 DBRDMT SUBQ TISS EACH ADDL: CPT

## 2021-06-18 ASSESSMENT — PAIN SCALES - GENERAL: PAINLEVEL: NO PAIN

## 2021-06-18 ASSESSMENT — ENCOUNTER SYMPTOMS
SORE THROAT: 0
NAUSEA: 0
DIZZINESS: 0
FALLS: 0
MYALGIAS: 0
SENSORY CHANGE: 0
HEADACHES: 0
SHORTNESS OF BREATH: 0
NERVOUS/ANXIOUS: 0
CHILLS: 0
WEAKNESS: 0
SPUTUM PRODUCTION: 0
FEVER: 0
SINUS PAIN: 0
PALPITATIONS: 0
DEPRESSION: 0
VOMITING: 0
DIARRHEA: 0
COUGH: 0

## 2021-06-18 ASSESSMENT — LIFESTYLE VARIABLES: SUBSTANCE_ABUSE: 0

## 2021-06-18 NOTE — PATIENT INSTRUCTIONS
-Keep your wound dressing clean, dry, and intact.    -Change your dressing every 3 to 4 days or if it becomes soiled, soaked, or falls off.    -Should you experience any significant changes in your wound(s), such as infection (redness, swelling, localized heat, increased pain, fever > 101 F, chills) or have any questions regarding your home care instructions, please contact the wound center at [phone]. If after hours, contact your primary care physician or go to the hospital emergency room.

## 2021-06-18 NOTE — PROGRESS NOTES
Provider Encounter- Lower Extremity Ulcer      HISTORY OF PRESENT ILLNESS  Wound History:    START OF CARE IN CLINIC: 5/18/2021    REFERRING PROVIDER:  RAFITA Barker       WOUND ETIOLOGY: Trauma   LOCATION: left medial lower extremity, left posterior knee.    HISTORY:  Patient is a 65-year-old male with a past medical history of alcohol dependence, tobacco dependence, psychiatric disorder, and HTN who presented to the emergency department on 8/7/2020 with a left lower extremity wound infection. He was evaluated by orthopedic surgery who recommended ongoing wound care. Wound cultures at that time grew MSSA and Streptococcus. He had been seen at [facility] earlier and was prescribed antibiotics, but never filled the prescription.  An ultrasound of that extremity was done and was negative for DVT.  He was treated for sepsis secondary to cellulitis and completed an antibiotic course on 9/16/2020.  He underwent multiple bedside debridements and aggressive wound care.  Over admission he was also found to have head lice and underwent treatment for that.       A repeat wound culture was done on 9/28/2020 and grew Strep A and Proteus. Infectious disease was consulted and recommended a 5-day course of IV zosyn which was completed on 10/5/2020. On 10/12/2020 the wound care team noticed increased inflammation to the proximal part of the wound bed and switched from a vera flow wound VAC to a regular wound VAC.  ID was again consulted and on 10/14/2020 recommended a 7-day course of antibiotics with meropenem which was completed on 10/22/2020.  On 11/19/2020 he underwent left lower extremity debridement with wound VAC placement.  He continues to undergo wound care and has wound VAC in place.  His wound appears to be healing appropriately.  He will require ongoing wound care outside the hospital.     Additionally, he was noted to have intermittent agitation secondary to underlying psychiatric disorder and was  started on risperdal, depakote, and gabapentin per psychiatry recommendations.  He has remained stable but has been deemed incapacitated to make medical decisions so guardianship was pursued and granted on 1/29/21.  He was deemed  appropriate for group home placement with home health ordered for wound care and wound VAC management.  He was discharged to group home on 5/3/2021 home health coming by and doing wound VAC changes 2 times per week.       Pertinent Medical History:    VASCULAR HISTORY:  alcohol dependence, tobacco dependence, psychiatric disorder, and HTN  Compression: two layer compression  Varicose Veins: None        TOBACCO USE:  Former smoker    Patient's problem list, allergies, and current medications reviewed and updated in Epic    Interval History:  6/4/2021: Initial clinic visit with RAFITA Nelson.  Patient reports he is living in a homeless shelter and home health nurse change his dressings 2 times per week.  He denies fever, chills, nausea, vomiting, chest pain, headache, shortness of breath.  He states that he thinks his wounds appear to be improving.      6/11/2021 : Clinic visit with RAFITA Rueda, FNP-BC, CWOCN, CFCN.  Bola presents today accompanied by his caregiver.  His VAC malfunctioned, a new one has been ordered but not yet delivered.  He is currently living in a group home.  Home health is seeing him 2 times per week in between clinic visits.    6/18/2021 : Clinic visit with Dr. Tim.  Patient was here today for a follow-up visit he was without his caregiver.  He had no complaints concerns or questions or acute medical issues that he related      REVIEW OF SYSTEMS:   Review of Systems   Constitutional: Negative for chills, fever and malaise/fatigue.   HENT: Negative for congestion, sinus pain and sore throat.    Respiratory: Negative for cough, sputum production and shortness of breath.    Cardiovascular: Negative for chest pain, palpitations and leg swelling.    Gastrointestinal: Negative for diarrhea, nausea and vomiting.   Musculoskeletal: Negative for falls and myalgias.   Skin: Negative for itching and rash.   Neurological: Negative for dizziness, sensory change, weakness and headaches.   Psychiatric/Behavioral: Negative for depression and substance abuse. The patient is not nervous/anxious.      PHYSICAL EXAMINATION:   There were no vitals taken for this visit.  Physical Exam  Constitutional:       Appearance: Normal appearance. He is normal weight.      Comments: Well-kept, thin elderly male.   HENT:      Head: Normocephalic and atraumatic.      Nose: Nose normal.   Eyes:      General:         Right eye: No discharge.         Left eye: No discharge.      Extraocular Movements: Extraocular movements intact.      Pupils: Pupils are equal, round, and reactive to light.   Cardiovascular:      Rate and Rhythm: Normal rate and regular rhythm.   Pulmonary:      Effort: Pulmonary effort is normal.      Comments: Respirations even and unlabored, even chest rise and fall  Musculoskeletal:         General: No swelling or tenderness. Normal range of motion.      Cervical back: Normal range of motion. No rigidity.   Skin:     General: Skin is warm and dry.      Findings: No erythema or rash.      Comments: Full-thickness wound to left medial lower leg and left posterior knee.  As are attached, P granular tissue noted.    See flowsheet and photos for further details   Neurological:      Mental Status: He is alert and oriented to person, place, and time. Mental status is at baseline.   Psychiatric:         Mood and Affect: Mood normal.         Behavior: Behavior normal.         WOUND ASSESSMENT        Negative Pressure Wound Therapy 11/20/20 Leg Lateral;Posterior;Medial Left (Active)           Wound 11/19/20 Full Thickness Wound Leg Lateral;Posterior;Medial Left (Active)       Wound 05/18/21 Traumatic Knee;Thigh Posterior Left --Left Popliteal Fossa/Posterior Thigh (Active)    Wound Image    06/18/21 1500   Site Assessment Red;Yellow 06/18/21 1500   Periwound Assessment Scar tissue 06/18/21 1500   Margins Attached edges 06/18/21 1500   Closure Secondary intention 05/18/21 1700   Drainage Amount Moderate 06/18/21 1500   Drainage Description Serosanguineous 06/18/21 1500   Treatments Cleansed;Topical Lidocaine;Provider debridement 06/18/21 1500   Wound Cleansing Normal Saline Irrigation 06/18/21 1500   Periwound Protectant Skin Protectant Wipes to Periwound;Barrier Paste 06/18/21 1500   Dressing Cleansing/Solutions Not Applicable 06/18/21 1500   Dressing Options Hydrofera Blue Ready;Hypafix Tape;Tubigrip 06/18/21 1500   Dressing Changed New 06/18/21 1500   Dressing Status Clean;Dry;Intact 05/18/21 1700   Dressing Change/Treatment Frequency Monday, Wednesday, Friday, and As Needed 06/18/21 1500   Non-staged Wound Description Full thickness 06/18/21 1500   Wound Length (cm) 9.6 cm 06/18/21 1500   Wound Width (cm) 2.1 cm 06/18/21 1500   Wound Depth (cm) 0 cm 06/18/21 1500   Wound Surface Area (cm^2) 20.16 cm^2 06/18/21 1500   Wound Volume (cm^3) 0 cm^3 06/18/21 1500   Post-Procedure Length (cm) 9.8 cm 06/18/21 1500   Post-Procedure Width (cm) 2.4 cm 06/18/21 1500   Post-Procedure Depth (cm) 0 cm 06/18/21 1500   Post-Procedure Surface Area (cm^2) 23.52 cm^2 06/18/21 1500   Post-Procedure Volume (cm^3) 0 cm^3 06/18/21 1500   Wound Healing % 100 06/18/21 1500   Wound Bed Granulation (%) 90 % 05/18/21 1700   Wound Bed Slough (%) 10 % 05/18/21 1700   Tunneling (cm) 0 cm 06/18/21 1500   Undermining (cm) 0 cm 06/18/21 1500   Wound Odor None 06/18/21 1500   Pulses 2+;DP;PT;Doppler 05/18/21 1700   Exposed Structures None 06/18/21 1500       Wound 05/18/21 Traumatic Leg Medial;Distal Left --Left Medial LE Inferior Wound (Active)   Wound Image    06/18/21 1500   Site Assessment Red;Yellow 06/18/21 1500   Periwound Assessment Scar tissue 06/18/21 1500   Margins Attached edges 06/18/21 1500   Closure  Secondary intention 05/18/21 1700   Drainage Amount Moderate 06/18/21 1500   Drainage Description Serosanguineous 06/18/21 1500   Treatments Cleansed;Topical Lidocaine;Provider debridement 06/18/21 1500   Wound Cleansing Normal Saline Irrigation 06/18/21 1500   Periwound Protectant Skin Protectant Wipes to Periwound;Barrier Paste 06/18/21 1500   Dressing Cleansing/Solutions Not Applicable 06/18/21 1500   Dressing Options Hydrofera Blue Ready;Hypafix Tape;Tubigrip 06/18/21 1500   Dressing Changed New 06/18/21 1500   Dressing Status Clean;Dry;Intact 05/18/21 1700   Dressing Change/Treatment Frequency Monday, Wednesday, Friday, and As Needed 06/18/21 1500   Non-staged Wound Description Full thickness 06/18/21 1500   Wound Length (cm) 5.2 cm 06/18/21 1500   Wound Width (cm) 1.4 cm 06/18/21 1500   Wound Depth (cm) 0 cm 06/18/21 1500   Wound Surface Area (cm^2) 7.28 cm^2 06/18/21 1500   Wound Volume (cm^3) 0 cm^3 06/18/21 1500   Post-Procedure Length (cm) 5.1 cm 06/18/21 1500   Post-Procedure Width (cm) 1.4 cm 06/18/21 1500   Post-Procedure Depth (cm) 0 cm 06/18/21 1500   Post-Procedure Surface Area (cm^2) 7.14 cm^2 06/18/21 1500   Post-Procedure Volume (cm^3) 0 cm^3 06/18/21 1500   Wound Healing % 100 06/18/21 1500   Wound Bed Granulation (%) 95 % 05/18/21 1700   Wound Bed Slough (%) 5 % 05/18/21 1700   Tunneling (cm) 0 cm 06/18/21 1500   Undermining (cm) 0 cm 06/18/21 1500   Wound Odor None 06/18/21 1500   Pulses 2+;DP;PT;Doppler 05/18/21 1700   Exposed Structures None 06/18/21 1500       PROCEDURE:   -2% viscous lidocaine applied topically to wound beds for approximately 5 minutes prior to debridement  -Curette was used to debride wound beds.  Excisional debridement was performed to remove devitalized tissue until healthy, bleeding tissue was visualized.   Entire surface of wounds 30.66 cm² debrided, into the subcutaneous layer.  There were extensive hyper granulation tissue on both wounds decision was to apply  [place] Blue restart wound VAC and epi fix at the next visit depending on the regression of the hyper granulation tissue  - Epi fix was applied to left posterior knee wound only.  -Bleeding controlled with manual pressure  -Wound care completed per orders, by [facility] RN    EPIFIX APPLICATION:   6/4/2021: First application of epi fix mesh 4cm x 4.5cm. [id number], EXP 03/01/2026  REORDER # [id number]    6/11/2021: Second application of epi fix mesh 4cm x 4.5cm. [id number], EXP 03/01/2026  REORDER # [id number]      Pertinent Labs and Diagnostics:    Labs:     IMAGING: No results found.    VASCULAR STUDIES: No results found.     LAST  WOUND CULTURE:   Lab Results   Component Value Date/Time    CULTRSULT Light growth usual skin lotus. (A) 05/18/2021 04:01 PM    CULTRSULT Proteus mirabilis  Moderate growth   (A) 05/18/2021 04:01 PM       ASSESSMENT AND PLAN:     1. Wound of left lower extremity, subsequent encounter    6/18/2021 -Excisional debridement of wound in clinic today, medically necessary to promote wound healing.  -Patient to return to clinic weekly for assessment and debridement  -Home health to change dressing 1-2 times per week in between clinic visits.  Restart VAC when new pump available  -Continue to monitor for signs and symptoms of infection.    Wound care:   Left posterior knee: Epi fix, Adaptic with Steri-Strips, .  2 layer compression.  Left medial lower extremity: , 2 layer compression.    2. Pain  6/11/2021: Patient has full sensation to bilateral lower extremities.   -2% viscous lidocaine applied topically to wound bed for approximately 5 minutes prior to debridement  -Patient tolerated procedure today with no complaints of discomfort.    3. Cognitive impairment  Comment: Patient has a history of intermittent agitation secondary to underlying psychiatric disorder.  Patient taking Risperdal, Depakote, gabapentin per psychiatric recommendations.  Patient is stable but has been  deemed incapacitated to make medical decisions and has radiation granted as of 1/29/2021.  Patient is stable to discharge and was discharged to a group home with home health.    6/11/2021: Patient is calm and cooperative today, .  Discussed with patient need for debridement,         PATIENT EDUCATION  - Importance of managing edema for healing of ulcer, and for prevention of new ulcer development  -Need for lifelong compression of lower legs   -Elevate legs above the level of the heart periodically throughout the day.  - Importance of adequate nutrition for wound healing  -Advised to go to ER for any increased redness, swelling, drainage or odor, or if patient develops fever, chills, nausea or vomiting.    15 min spent face to face with patient, >50% of time spent counseling, coordinating care, reviewing records, discussing POC, educating patient regarding vascular disease, wound healing and progression. This time was spent in excess to procedure time.       Please note that this note may have been created using voice recognition software. I have worked with technical experts from [employer] to optimize the interface.  I have made every reasonable attempt to correct obvious errors, but there may be errors of grammar and possibly

## 2021-06-25 ENCOUNTER — OFFICE VISIT (OUTPATIENT)
Dept: WOUND CARE | Facility: MEDICAL CENTER | Age: 66
End: 2021-06-25
Attending: NURSE PRACTITIONER
Payer: MEDICARE

## 2021-06-25 VITALS
RESPIRATION RATE: 16 BRPM | OXYGEN SATURATION: 94 % | TEMPERATURE: 98.3 F | SYSTOLIC BLOOD PRESSURE: 107 MMHG | DIASTOLIC BLOOD PRESSURE: 68 MMHG | HEART RATE: 87 BPM

## 2021-06-25 DIAGNOSIS — L08.9 WOUND INFECTION: ICD-10-CM

## 2021-06-25 DIAGNOSIS — R52 PAIN: ICD-10-CM

## 2021-06-25 DIAGNOSIS — F10.21 ALCOHOLISM IN REMISSION (HCC): ICD-10-CM

## 2021-06-25 DIAGNOSIS — S81.802A OPEN LEG WOUND, LEFT, INITIAL ENCOUNTER: ICD-10-CM

## 2021-06-25 DIAGNOSIS — R41.89 COGNITIVE IMPAIRMENT: ICD-10-CM

## 2021-06-25 DIAGNOSIS — L03.116 CELLULITIS OF LEFT LOWER EXTREMITY: ICD-10-CM

## 2021-06-25 DIAGNOSIS — T14.8XXA WOUND INFECTION: ICD-10-CM

## 2021-06-25 DIAGNOSIS — R73.03 PRE-DIABETES: ICD-10-CM

## 2021-06-25 DIAGNOSIS — S81.802D WOUND OF LEFT LOWER EXTREMITY, SUBSEQUENT ENCOUNTER: ICD-10-CM

## 2021-06-25 PROCEDURE — 99213 OFFICE O/P EST LOW 20 MIN: CPT

## 2021-06-25 PROCEDURE — 11042 DBRDMT SUBQ TIS 1ST 20SQCM/<: CPT

## 2021-06-25 PROCEDURE — 11045 DBRDMT SUBQ TISS EACH ADDL: CPT

## 2021-06-25 ASSESSMENT — ENCOUNTER SYMPTOMS
VOMITING: 0
COUGH: 0
HEADACHES: 0
DIARRHEA: 0
SHORTNESS OF BREATH: 0
NAUSEA: 0
MYALGIAS: 0
PALPITATIONS: 0
NERVOUS/ANXIOUS: 0
WEAKNESS: 0
DEPRESSION: 0
SINUS PAIN: 0
SENSORY CHANGE: 0
CHILLS: 0
FALLS: 0
SORE THROAT: 0
FEVER: 0
SPUTUM PRODUCTION: 0
DIZZINESS: 0

## 2021-06-25 ASSESSMENT — LIFESTYLE VARIABLES: SUBSTANCE_ABUSE: 0

## 2021-06-25 NOTE — PATIENT INSTRUCTIONS
Should you experience any significant changes in your wound(s) such as infection (redness, swelling, localized heat, increased pain, fever >101 F, chills) or have any questions regarding your home care instructions, please contact the wound center [phone]. If after hours, contact your primary care physician or go the hospital emergency room.  Keep dressing clean and dry and cover while bathing. Only change dressing if over saturated, soiled or its falling off.

## 2021-06-25 NOTE — PROGRESS NOTES
Provider Encounter- Lower Extremity Ulcer      HISTORY OF PRESENT ILLNESS  Wound History:    START OF CARE IN CLINIC: 5/18/2021    REFERRING PROVIDER:  RAFITA Barker       WOUND ETIOLOGY: Trauma   LOCATION: left medial lower extremity, left posterior knee.    HISTORY:  Patient is a 65-year-old male with a past medical history of alcohol dependence, tobacco dependence, psychiatric disorder, and HTN who presented to the emergency department on 8/7/2020 with a left lower extremity wound infection. He was evaluated by orthopedic surgery who recommended ongoing wound care. Wound cultures at that time grew MSSA and Streptococcus. He had been seen at [facility] earlier and was prescribed antibiotics, but never filled the prescription.  An ultrasound of that extremity was done and was negative for DVT.  He was treated for sepsis secondary to cellulitis and completed an antibiotic course on 9/16/2020.  He underwent multiple bedside debridements and aggressive wound care.  Over admission he was also found to have head lice and underwent treatment for that.       A repeat wound culture was done on 9/28/2020 and grew Strep A and Proteus. Infectious disease was consulted and recommended a 5-day course of IV zosyn which was completed on 10/5/2020. On 10/12/2020 the wound care team noticed increased inflammation to the proximal part of the wound bed and switched from a vera flow wound VAC to a regular wound VAC.  ID was again consulted and on 10/14/2020 recommended a 7-day course of antibiotics with meropenem which was completed on 10/22/2020.  On 11/19/2020 he underwent left lower extremity debridement with wound VAC placement.  He continues to undergo wound care and has wound VAC in place.  His wound appears to be healing appropriately.  He will require ongoing wound care outside the hospital.     Additionally, he was noted to have intermittent agitation secondary to underlying psychiatric disorder and was  started on risperdal, depakote, and gabapentin per psychiatry recommendations.  He has remained stable but has been deemed incapacitated to make medical decisions so guardianship was pursued and granted on 1/29/21.  He was deemed  appropriate for group home placement with home health ordered for wound care and wound VAC management.  He was discharged to group home on 5/3/2021 home health coming by and doing wound VAC changes 2 times per week.       Pertinent Medical History:    VASCULAR HISTORY:  alcohol dependence, tobacco dependence, psychiatric disorder, and HTN  Compression: two layer compression  Varicose Veins: None        TOBACCO USE:  Former smoker    Patient's problem list, allergies, and current medications reviewed and updated in Epic    Interval History:  6/4/2021: Initial clinic visit with RAFITA Nelson.  Patient reports he is living in a homeless shelter and home health nurse change his dressings 2 times per week.  He denies fever, chills, nausea, vomiting, chest pain, headache, shortness of breath.  He states that he thinks his wounds appear to be improving.      6/11/2021 : Clinic visit with RAFITA Rueda, FNP-BC, CWOCN, CFCN.  Bola presents today accompanied by his caregiver.  His VAC malfunctioned, a new one has been ordered but not yet delivered.  He is currently living in a group home.  Home health is seeing him 2 times per week in between clinic visits.    6/18/2021 : Clinic visit with Dr. Tim.  Patient was here today for a follow-up visit he was without his caregiver.  He had no complaints concerns or questions or acute medical issues that he related    6/25/2021 : Clinic visit with Dr. Tim.  Patient returns today without a caregiver.  Does not know what has happened to his wound VAC.  We will request home health to inquire if any new  wound VAC was delivered to his group home.  If it is there to restart the wound VAC.  We will reevaluate the Epi Fixx once we know the status  of the wound VAC.        REVIEW OF SYSTEMS:   Review of Systems   Constitutional: Negative for chills, fever and malaise/fatigue.   HENT: Negative for congestion, sinus pain and sore throat.    Respiratory: Negative for cough, sputum production and shortness of breath.    Cardiovascular: Negative for chest pain, palpitations and leg swelling.   Gastrointestinal: Negative for diarrhea, nausea and vomiting.   Musculoskeletal: Negative for falls and myalgias.   Skin: Negative for itching and rash.   Neurological: Negative for dizziness, sensory change, weakness and headaches.   Psychiatric/Behavioral: Negative for depression and substance abuse. The patient is not nervous/anxious.      PHYSICAL EXAMINATION:   There were no vitals taken for this visit.  Physical Exam  Constitutional:       Appearance: Normal appearance. He is normal weight.      Comments: Well-kept, thin elderly male.   HENT:      Head: Normocephalic and atraumatic.      Nose: Nose normal.   Eyes:      General:         Right eye: No discharge.         Left eye: No discharge.      Extraocular Movements: Extraocular movements intact.      Pupils: Pupils are equal, round, and reactive to light.   Cardiovascular:      Rate and Rhythm: Normal rate and regular rhythm.   Pulmonary:      Effort: Pulmonary effort is normal.      Comments: Respirations even and unlabored, even chest rise and fall  Musculoskeletal:         General: No swelling or tenderness. Normal range of motion.      Cervical back: Normal range of motion. No rigidity.   Skin:     General: Skin is warm and dry.      Findings: No erythema or rash.      Comments: Full-thickness wound to left medial lower leg and left posterior knee.  As are attached, P granular tissue noted.    See flowsheet and photos for further details   Neurological:      Mental Status: He is alert and oriented to person, place, and time. Mental status is at baseline.   Psychiatric:         Mood and Affect: Mood normal.          Behavior: Behavior normal.         WOUND ASSESSMENT       Negative Pressure Wound Therapy 11/20/20 Leg Lateral;Posterior;Medial Left (Active)           Wound 11/19/20 Full Thickness Wound Leg Lateral;Posterior;Medial Left (Active)       Wound 05/18/21 Traumatic Knee;Thigh Posterior Left --Left Popliteal Fossa/Posterior Thigh (Active)   Wound Image    06/25/21 1400   Site Assessment Red;Yellow 06/25/21 1400   Periwound Assessment Scar tissue 06/25/21 1400   Margins Attached edges 06/25/21 1400   Closure Secondary intention 05/18/21 1700   Drainage Amount Moderate 06/25/21 1400   Drainage Description Serosanguineous 06/25/21 1400   Treatments Cleansed;Topical Lidocaine;Provider debridement 06/25/21 1400   Wound Cleansing Approved Wound Cleanser 06/25/21 1400   Periwound Protectant Barrier Paste;Skin Protectant Wipes to Periwound 06/25/21 1400   Dressing Cleansing/Solutions Normal Saline 06/25/21 1400   Dressing Options Hydrofera Blue Ready;Super Absorbent Pad;Tubigrip;Hypafix Tape 06/25/21 1400   Dressing Changed Changed 06/25/21 1400   Dressing Status Clean;Dry;Intact 05/18/21 1700   Dressing Change/Treatment Frequency Monday, Wednesday, Friday, and As Needed 06/25/21 1400   Non-staged Wound Description Full thickness 06/25/21 1400   Wound Length (cm) 10.1 cm 06/25/21 1400   Wound Width (cm) 2 cm 06/25/21 1400   Wound Depth (cm) 0.1 cm 06/25/21 1400   Wound Surface Area (cm^2) 20.2 cm^2 06/25/21 1400   Wound Volume (cm^3) 2.02 cm^3 06/25/21 1400   Post-Procedure Length (cm) 10 cm 06/25/21 1400   Post-Procedure Width (cm) 2 cm 06/25/21 1400   Post-Procedure Depth (cm) 0.2 cm 06/25/21 1400   Post-Procedure Surface Area (cm^2) 20 cm^2 06/25/21 1400   Post-Procedure Volume (cm^3) 4 cm^3 06/25/21 1400   Wound Healing % 78 06/25/21 1400   Wound Bed Granulation (%) 70 % 06/25/21 1400   Wound Bed Slough (%) 30 % 06/25/21 1400   Wound Bed Granulation (%) - Post-Procedure 70 % 06/25/21 1400   Wound Bed Slough % -  (Post-Procedure) 30 % 06/25/21 1400   Tunneling (cm) 0 cm 06/25/21 1400   Undermining (cm) 0 cm 06/25/21 1400   Wound Odor None 06/25/21 1400   Pulses 2+;DP;PT;Doppler 05/18/21 1700   Exposed Structures None 06/25/21 1400       Wound 05/18/21 Traumatic Leg Medial;Distal Left --Left Medial LE Inferior Wound (Active)   Wound Image    06/25/21 1400   Site Assessment Red;Yellow 06/25/21 1400   Periwound Assessment Scar tissue 06/25/21 1400   Margins Attached edges 06/25/21 1400   Closure Secondary intention 05/18/21 1700   Drainage Amount Small 06/25/21 1400   Drainage Description Serosanguineous 06/25/21 1400   Treatments Cleansed 06/25/21 1400   Wound Cleansing Approved Wound Cleanser 06/25/21 1400   Periwound Protectant Barrier Paste;Skin Protectant Wipes to Periwound 06/25/21 1400   Dressing Cleansing/Solutions Normal Saline 06/25/21 1400   Dressing Options Hydrofera Blue Ready;Hypafix Tape;Silicone Adhesive Foam;Tubigrip 06/25/21 1400   Dressing Changed Changed 06/25/21 1400   Dressing Status Clean;Dry;Intact 05/18/21 1700   Dressing Change/Treatment Frequency Monday, Wednesday, Friday, and As Needed 06/25/21 1400   Non-staged Wound Description Full thickness 06/25/21 1400   Wound Length (cm) 1 cm 06/25/21 1400   Wound Width (cm) 0.7 cm 06/25/21 1400   Wound Depth (cm) 0.1 cm 06/25/21 1400   Wound Surface Area (cm^2) 0.7 cm^2 06/25/21 1400   Wound Volume (cm^3) 0.07 cm^3 06/25/21 1400   Post-Procedure Length (cm) 1 cm 06/25/21 1400   Post-Procedure Width (cm) 0.7 cm 06/25/21 1400   Post-Procedure Depth (cm) 0.2 cm 06/25/21 1400   Post-Procedure Surface Area (cm^2) 0.7 cm^2 06/25/21 1400   Post-Procedure Volume (cm^3) 0.14 cm^3 06/25/21 1400   Wound Healing % 98 06/25/21 1400   Wound Bed Granulation (%) 70 % 06/25/21 1400   Wound Bed Slough (%) 30 % 06/25/21 1400   Wound Bed Granulation (%) - Post-Procedure 100 % 06/25/21 1400   Tunneling (cm) 0 cm 06/25/21 1400   Undermining (cm) 0 cm 06/25/21 1400   Wound Odor  None 06/25/21 1400   Pulses 2+;DP;PT;Doppler 05/18/21 1700   Exposed Structures None 06/25/21 1400       Wound 06/25/21 Leg Medial;Superior Left (Active)   Wound Image    06/25/21 1400   Site Assessment Red;Yellow 06/25/21 1400   Periwound Assessment Intact 06/25/21 1400   Margins Attached edges 06/25/21 1400   Closure Secondary intention 06/25/21 1400   Drainage Amount Small 06/25/21 1400   Drainage Description Serosanguineous 06/25/21 1400   Treatments Cleansed;Topical Lidocaine;Provider debridement 06/25/21 1400   Wound Cleansing Approved Wound Cleanser 06/25/21 1400   Periwound Protectant Barrier Paste;Skin Protectant Wipes to Periwound 06/25/21 1400   Dressing Cleansing/Solutions Normal Saline 06/25/21 1400   Dressing Options Hydrofera Blue Ready;Silicone Adhesive Foam;Mepilex Silver;Tubigrip;Silver Sulfadiazine (Silvadene) ;Hypafix Tape 06/25/21 1400   Dressing Changed Changed 06/25/21 1400   Dressing Status Clean;Dry;Intact 06/25/21 1400   Dressing Change/Treatment Frequency Every 72 hrs, and As Needed 06/25/21 1400   Non-staged Wound Description Full thickness 06/25/21 1400   Wound Length (cm) 1.6 cm 06/25/21 1400   Wound Width (cm) 0.6 cm 06/25/21 1400   Wound Depth (cm) 0.1 cm 06/25/21 1400   Wound Surface Area (cm^2) 0.96 cm^2 06/25/21 1400   Wound Volume (cm^3) 0.1 cm^3 06/25/21 1400   Post-Procedure Length (cm) 2 cm 06/25/21 1400   Post-Procedure Width (cm) 0.6 cm 06/25/21 1400   Post-Procedure Depth (cm) 0.2 cm 06/25/21 1400   Post-Procedure Surface Area (cm^2) 1.2 cm^2 06/25/21 1400   Post-Procedure Volume (cm^3) 0.24 cm^3 06/25/21 1400   Wound Bed Granulation (%) 80 % 06/25/21 1400   Wound Bed Slough (%) 20 % 06/25/21 1400   Wound Bed Granulation (%) - Post-Procedure 100 % 06/25/21 1400   Tunneling (cm) 0 cm 06/25/21 1400   Undermining (cm) 0 cm 06/25/21 1400   Wound Odor None 06/25/21 1400   Pulses DP;1+;Left 06/25/21 1400   Exposed Structures None 06/25/21 1400           PROCEDURE:   -2% viscous  lidocaine applied topically to wound beds for approximately 5 minutes prior to debridement  -Curette was used to debride wound beds.  Excisional debridement was performed to remove devitalized tissue until healthy, bleeding tissue was visualized.   Entire surface of wounds 21.9 cm² debrided, into the subcutaneous layer.    -Bleeding controlled with manual pressure  -Wound care completed by wound care RN    EPIFIX APPLICATION:   6/4/2021: First application of epi fix mesh 4cm x 4.5cm. [id number], EXP 03/01/2026  REORDER # [id number]    6/11/2021: Second application of epi fix mesh 4cm x 4.5cm. [id number], EXP 03/01/2026  REORDER # [id number]      Pertinent Labs and Diagnostics:    Labs:     IMAGING: No results found.    VASCULAR STUDIES: No results found.     LAST  WOUND CULTURE:   Lab Results   Component Value Date/Time    CULTRSULT Light growth usual skin lotus. (A) 05/18/2021 04:01 PM    CULTRSULT Proteus mirabilis  Moderate growth   (A) 05/18/2021 04:01 PM       ASSESSMENT AND PLAN:     1. Wound of left lower extremity, subsequent encounter    6/18/2021 -Excisional debridement of wound in clinic today, medically necessary to promote wound healing.  -Patient to return to clinic weekly for assessment and debridement  -Home health to change dressing 1-2 times per week in between clinic visits.  Restart VAC when new pump available  -Continue to monitor for signs and symptoms of infection.    Wound care:   Left posterior knee: Epi fix, Adaptic with Steri-Strips, .  2 layer compression.  Left medial lower extremity: , 2 layer compression.    2. Pain  6/25/2021: Patient has full sensation to bilateral lower extremities.   -2% viscous lidocaine applied topically to wound bed for approximately 5 minutes prior to debridement  -Patient tolerated procedure today with no complaints of discomfort.    3. Cognitive impairment  Comment: Patient has a history of intermittent agitation secondary to underlying psychiatric  disorder.  Patient taking Risperdal, Depakote, gabapentin per psychiatric recommendations.  Patient is stable but has been deemed incapacitated to make medical decisions and has radiation granted as of 1/29/2021.  Patient is stable to discharge and was discharged to a group home with home health.    6/25/2021: Patient is calm and cooperative today, .  Discussed with patient need for debridement,         PATIENT EDUCATION  - Importance of managing edema for healing of ulcer, and for prevention of new ulcer development  -Need for lifelong compression of lower legs   -Elevate legs above the level of the heart periodically throughout the day.  - Importance of adequate nutrition for wound healing  -Advised to go to ER for any increased redness, swelling, drainage or odor, or if patient develops fever, chills, nausea or vomiting.    15 min spent face to face with patient, >50% of time spent counseling, coordinating care, reviewing records, discussing POC, educating patient regarding vascular disease, wound healing and progression. This time was spent in excess to procedure time.       Please note that this note may have been created using voice recognition software. I have worked with technical experts from [employer] to optimize the interface.  I have made every reasonable attempt to correct obvious errors, but there may be errors of grammar and possibly

## 2021-07-02 ENCOUNTER — NON-PROVIDER VISIT (OUTPATIENT)
Dept: WOUND CARE | Facility: MEDICAL CENTER | Age: 66
End: 2021-07-02
Attending: NURSE PRACTITIONER
Payer: MEDICARE

## 2021-07-02 PROCEDURE — 99211 OFF/OP EST MAY X REQ PHY/QHP: CPT

## 2021-07-02 NOTE — PATIENT INSTRUCTIONS
-Keep your wound dressing clean, dry, and intact.    -Should you experience any significant changes in your wound(s), such as infection (redness, swelling, localized heat, increased pain, fever > 101 F, chills) or have any questions regarding your home care instructions, please contact the wound center at [phone]. If after hours, contact your primary care physician or go to the hospital emergency room.

## 2021-07-09 ENCOUNTER — OFFICE VISIT (OUTPATIENT)
Dept: WOUND CARE | Facility: MEDICAL CENTER | Age: 66
End: 2021-07-09
Attending: NURSE PRACTITIONER
Payer: MEDICARE

## 2021-07-09 VITALS
OXYGEN SATURATION: 93 % | DIASTOLIC BLOOD PRESSURE: 77 MMHG | TEMPERATURE: 97.7 F | SYSTOLIC BLOOD PRESSURE: 109 MMHG | HEART RATE: 89 BPM | RESPIRATION RATE: 15 BRPM

## 2021-07-09 DIAGNOSIS — F10.21 ALCOHOLISM IN REMISSION (HCC): ICD-10-CM

## 2021-07-09 DIAGNOSIS — R41.89 COGNITIVE IMPAIRMENT: Primary | ICD-10-CM

## 2021-07-09 DIAGNOSIS — S81.802D WOUND OF LEFT LOWER EXTREMITY, SUBSEQUENT ENCOUNTER: ICD-10-CM

## 2021-07-09 DIAGNOSIS — R73.03 PRE-DIABETES: ICD-10-CM

## 2021-07-09 DIAGNOSIS — S81.802A OPEN LEG WOUND, LEFT, INITIAL ENCOUNTER: ICD-10-CM

## 2021-07-09 PROCEDURE — 11045 DBRDMT SUBQ TISS EACH ADDL: CPT

## 2021-07-09 PROCEDURE — 11042 DBRDMT SUBQ TIS 1ST 20SQCM/<: CPT

## 2021-07-09 ASSESSMENT — ENCOUNTER SYMPTOMS
NERVOUS/ANXIOUS: 0
DIZZINESS: 0
HEADACHES: 0
COUGH: 0
SORE THROAT: 0
SHORTNESS OF BREATH: 0
SPUTUM PRODUCTION: 0
SINUS PAIN: 0
NAUSEA: 0
VOMITING: 0
WEAKNESS: 0
CHILLS: 0
MYALGIAS: 0
PALPITATIONS: 0
DEPRESSION: 0
SENSORY CHANGE: 0
FEVER: 0
FALLS: 0
DIARRHEA: 0

## 2021-07-09 ASSESSMENT — PAIN SCALES - GENERAL: PAINLEVEL: NO PAIN

## 2021-07-09 ASSESSMENT — LIFESTYLE VARIABLES: SUBSTANCE_ABUSE: 0

## 2021-07-09 NOTE — PROGRESS NOTES
Provider Encounter- Lower Extremity Ulcer      HISTORY OF PRESENT ILLNESS  Wound History:    START OF CARE IN CLINIC: 5/18/2021    REFERRING PROVIDER:  RAFITA Barker       WOUND ETIOLOGY: Trauma   LOCATION: left medial lower extremity, left posterior knee.    HISTORY:  Patient is a 65-year-old male with a past medical history of alcohol dependence, tobacco dependence, psychiatric disorder, and HTN who presented to the emergency department on 8/7/2020 with a left lower extremity wound infection. He was evaluated by orthopedic surgery who recommended ongoing wound care. Wound cultures at that time grew MSSA and Streptococcus. He had been seen at [facility] earlier and was prescribed antibiotics, but never filled the prescription.  An ultrasound of that extremity was done and was negative for DVT.  He was treated for sepsis secondary to cellulitis and completed an antibiotic course on 9/16/2020.  He underwent multiple bedside debridements and aggressive wound care.  Over admission he was also found to have head lice and underwent treatment for that.       A repeat wound culture was done on 9/28/2020 and grew Strep A and Proteus. Infectious disease was consulted and recommended a 5-day course of IV zosyn which was completed on 10/5/2020. On 10/12/2020 the wound care team noticed increased inflammation to the proximal part of the wound bed and switched from a vera flow wound VAC to a regular wound VAC.  ID was again consulted and on 10/14/2020 recommended a 7-day course of antibiotics with meropenem which was completed on 10/22/2020.  On 11/19/2020 he underwent left lower extremity debridement with wound VAC placement.  He continues to undergo wound care and has wound VAC in place.  His wound appears to be healing appropriately.  He will require ongoing wound care outside the hospital.     Additionally, he was noted to have intermittent agitation secondary to underlying psychiatric disorder and was  started on risperdal, depakote, and gabapentin per psychiatry recommendations.  He has remained stable but has been deemed incapacitated to make medical decisions so guardianship was pursued and granted on 1/29/21.  He was deemed  appropriate for group home placement with home health ordered for wound care and wound VAC management.  He was discharged to group home on 5/3/2021 home health coming by and doing wound VAC changes 2 times per week.       Pertinent Medical History:    VASCULAR HISTORY:  alcohol dependence, tobacco dependence, psychiatric disorder, and HTN  Compression: two layer compression  Varicose Veins: None        TOBACCO USE:  Former smoker    Patient's problem list, allergies, and current medications reviewed and updated in Epic    Interval History:  6/4/2021: Initial clinic visit with RAFITA Nelosn.  Patient reports he is living in a homeless shelter and home health nurse change his dressings 2 times per week.  He denies fever, chills, nausea, vomiting, chest pain, headache, shortness of breath.  He states that he thinks his wounds appear to be improving.      6/11/2021 : Clinic visit with RAFITA Rueda, FNP-BC, CWOCN, CFCN.  Bola presents today accompanied by his caregiver.  His VAC malfunctioned, a new one has been ordered but not yet delivered.  He is currently living in a group home.  Home health is seeing him 2 times per week in between clinic visits.    6/18/2021 : Clinic visit with Dr. Tim.  Patient was here today for a follow-up visit he was without his caregiver.  He had no complaints concerns or questions or acute medical issues that he related    6/25/2021 : Clinic visit with Dr. Tim.  Patient returns today without a caregiver.  Does not know what has happened to his wound VAC.  We will request home health to inquire if any new  wound VAC was delivered to his group home.  If it is there to restart the wound VAC.  We will reevaluate the Epi Fixx once we know the status  of the wound VAC.    7/9/2021 : Clinic visit with Dr. Tim.  Patient returns today for a follow-up visit without a caregiver.  He does not know where his wound VAC is and not sure he remembers what it was.  We will ask home health to see if they can find it at his group home.  He denies chills fever headache shortness of breath cough chest pain abdominal pain or other acute medical issues.          REVIEW OF SYSTEMS:   Review of Systems   Constitutional: Negative for chills, fever and malaise/fatigue.   HENT: Negative for congestion, sinus pain and sore throat.    Respiratory: Negative for cough, sputum production and shortness of breath.    Cardiovascular: Negative for chest pain, palpitations and leg swelling.   Gastrointestinal: Negative for diarrhea, nausea and vomiting.   Musculoskeletal: Negative for falls and myalgias.   Skin: Negative for itching and rash.   Neurological: Negative for dizziness, sensory change, weakness and headaches.   Psychiatric/Behavioral: Negative for depression and substance abuse. The patient is not nervous/anxious.      PHYSICAL EXAMINATION:   /77 (BP Location: Left arm, Patient Position: Sitting, BP Cuff Size: Small adult)   Pulse 89   Temp 36.5 °C (97.7 °F) (Temporal)   Resp 15   SpO2 93%   Physical Exam  Constitutional:       Appearance: Normal appearance. He is normal weight.      Comments: Well-kept, thin elderly male.   HENT:      Head: Normocephalic and atraumatic.      Nose: Nose normal.   Eyes:      General:         Right eye: No discharge.         Left eye: No discharge.      Extraocular Movements: Extraocular movements intact.      Pupils: Pupils are equal, round, and reactive to light.   Cardiovascular:      Rate and Rhythm: Normal rate and regular rhythm.   Pulmonary:      Effort: Pulmonary effort is normal.      Comments: Respirations even and unlabored, even chest rise and fall  Musculoskeletal:         General: No swelling or tenderness. Normal range of  motion.      Cervical back: Normal range of motion. No rigidity.   Skin:     General: Skin is warm and dry.      Findings: No erythema or rash.      Comments: Full-thickness wound to left medial lower leg and left posterior knee.  As are attached, P granular tissue noted.    See flowsheet and photos for further details   Neurological:      Mental Status: He is alert and oriented to person, place, and time. Mental status is at baseline.   Psychiatric:         Mood and Affect: Mood normal.         Behavior: Behavior normal.         WOUND ASSESSMENT        Negative Pressure Wound Therapy 11/20/20 Leg Lateral;Posterior;Medial Left (Active)           Wound 11/19/20 Full Thickness Wound Leg Lateral;Posterior;Medial Left (Active)       Wound 05/18/21 Traumatic Knee;Thigh Posterior Left --Left Popliteal Fossa/Posterior Thigh (Active)   Wound Image    07/09/21 1500   Site Assessment Red;Early/partial granulation 07/09/21 1500   Periwound Assessment Scar tissue;Dry 07/09/21 1500   Margins Attached edges 07/09/21 1500   Closure Secondary intention 05/18/21 1700   Drainage Amount Moderate 07/09/21 1500   Drainage Description Serosanguineous 07/09/21 1500   Treatments Cleansed;Topical Lidocaine;Provider debridement;Site care 07/09/21 1500   Wound Cleansing Puracyn Leroy 07/09/21 1500   Periwound Protectant Skin Protectant Wipes to Periwound;Barrier Paste 07/09/21 1500   Dressing Cleansing/Solutions Not Applicable 07/09/21 1500   Dressing Options Hydrofera Blue Classic;Nonadhesive Foam;Hypafix Tape;Tubigrip 07/09/21 1500   Dressing Changed Changed 07/09/21 1500   Dressing Status Clean;Dry;Intact 05/18/21 1700   Dressing Change/Treatment Frequency Every 72 hrs, and As Needed 07/02/21 1553   Non-staged Wound Description Full thickness 07/09/21 1500   Wound Length (cm) 8.4 cm 07/09/21 1500   Wound Width (cm) 1.4 cm 07/09/21 1500   Wound Depth (cm) 0.1 cm 07/09/21 1500   Wound Surface Area (cm^2) 11.76 cm^2 07/09/21 1500   Wound  Volume (cm^3) 1.18 cm^3 07/09/21 1500   Post-Procedure Length (cm) 8.5 cm 07/09/21 1500   Post-Procedure Width (cm) 1.5 cm 07/09/21 1500   Post-Procedure Depth (cm) 0.1 cm 07/09/21 1500   Post-Procedure Surface Area (cm^2) 12.75 cm^2 07/09/21 1500   Post-Procedure Volume (cm^3) 1.27 cm^3 07/09/21 1500   Wound Healing % 87 07/09/21 1500   Wound Bed Granulation (%) 70 % 06/25/21 1400   Wound Bed Slough (%) 30 % 06/25/21 1400   Wound Bed Granulation (%) - Post-Procedure 100 % 07/02/21 1553   Wound Bed Slough % - (Post-Procedure) 30 % 06/25/21 1400   Tunneling (cm) 0 cm 07/09/21 1500   Undermining (cm) 0 cm 07/09/21 1500   Wound Odor None 07/09/21 1500   Pulses 2+;DP;PT;Doppler 05/18/21 1700   Exposed Structures None 07/09/21 1500       Wound 05/18/21 Traumatic Leg Medial;Distal Left --Left Medial LE Inferior Wound (Active)   Wound Image   07/09/21 1500   Site Assessment Red;[place] 07/09/21 1500   Periwound Assessment Scar tissue 07/09/21 1500   Margins Attached edges 07/09/21 1500   Closure Secondary intention 05/18/21 1700   Drainage Amount Scant 07/09/21 1500   Drainage Description Serosanguineous 07/09/21 1500   Treatments Cleansed;Topical Lidocaine;Site care 07/09/21 1500   Wound Cleansing Puracyn Dobson 07/09/21 1500   Periwound Protectant Skin Protectant Wipes to Periwound;Barrier Paste 07/09/21 1500   Dressing Cleansing/Solutions Not Applicable 07/09/21 1500   Dressing Options Hydrofera Blue Ready;Nonadhesive Foam;Hypafix Tape;Tubigrip 07/09/21 1500   Dressing Changed Changed 07/09/21 1500   Dressing Status Clean;Dry;Intact 05/18/21 1700   Dressing Change/Treatment Frequency Monday, Wednesday, Friday, and As Needed 07/02/21 1553   Non-staged Wound Description Full thickness 07/09/21 1500   Wound Length (cm) 0.2 cm 07/09/21 1500   Wound Width (cm) 0.2 cm 07/09/21 1500   Wound Depth (cm) 0.1 cm 07/09/21 1500   Wound Surface Area (cm^2) 0.04 cm^2 07/09/21 1500   Wound Volume (cm^3) 0 cm^3 07/09/21 1500    Post-Procedure Length (cm) 1 cm 06/25/21 1400   Post-Procedure Width (cm) 0.7 cm 06/25/21 1400   Post-Procedure Depth (cm) 0.2 cm 06/25/21 1400   Post-Procedure Surface Area (cm^2) 0.7 cm^2 06/25/21 1400   Post-Procedure Volume (cm^3) 0.14 cm^3 06/25/21 1400   Wound Healing % 100 07/09/21 1500   Wound Bed Granulation (%) 70 % 06/25/21 1400   Wound Bed Slough (%) 30 % 06/25/21 1400   Wound Bed Granulation (%) - Post-Procedure 100 % 06/25/21 1400   Tunneling (cm) 0 cm 07/09/21 1500   Undermining (cm) 0 cm 07/09/21 1500   Wound Odor None 07/09/21 1500   Pulses 2+;DP;PT;Doppler 05/18/21 1700   Exposed Structures None 07/09/21 1500           PROCEDURE:   -2% viscous lidocaine applied topically to wound beds for approximately 5 minutes prior to debridement  -Curette was used to debride wound beds.  Excisional debridement was performed to remove devitalized tissue until healthy, bleeding tissue was visualized.   Entire surface of wounds 21.9 cm² debrided, into the subcutaneous layer.    -Bleeding controlled with manual pressure  -Wound care completed by wound care RN    EPIFIX APPLICATION:   6/4/2021: First application of epi fix mesh 4cm x 4.5cm. [id number], EXP 03/01/2026  REORDER # [id number]    6/11/2021: Second application of epi fix mesh 4cm x 4.5cm. [id number], EXP 03/01/2026  REORDER # [id number]      Pertinent Labs and Diagnostics:    Labs:     IMAGING: No results found.    VASCULAR STUDIES: No results found.     LAST  WOUND CULTURE:   Lab Results   Component Value Date/Time    CULTRSULT Light growth usual skin lotus. (A) 05/18/2021 04:01 PM    CULTRSULT Proteus mirabilis  Moderate growth   (A) 05/18/2021 04:01 PM       ASSESSMENT AND PLAN:     1. Wound of left lower extremity, subsequent encounter    The 921-Excisional debridement of wound in clinic today, medically necessary to promote wound healing.  -Both wounds have significantly improved since last visit the medial wound on the knee is too early 5%  healed with 25% area still needing debridement is at the inferior end of the wound.  The lateral wound is too small to allow the 4 mm curette to enter and is nearly completely healed.  - We will discontinue the wound VAC and see if it can be found and returned  -Patient to return to clinic weekly for assessment and debridement  -Home health to change dressing 1-2 times per week in between clinic visits.    -Continue to monitor for signs and symptoms of infection.    Wound care:   Left posterior knee: Epi fix, Adaptic with Steri-Strips, .  2 layer compression.  Left medial lower extremity: , 2 layer compression.    2. Pain  6/25/2021: Patient has full sensation to bilateral lower extremities.   -2% viscous lidocaine applied topically to wound bed for approximately 5 minutes prior to debridement  -Patient tolerated procedure today with no complaints of discomfort.    3. Cognitive impairment  Comment: Patient has a history of intermittent agitation secondary to underlying psychiatric disorder.  Patient taking Risperdal, Depakote, gabapentin per psychiatric recommendations.  Patient is stable but has been deemed incapacitated to make medical decisions and has radiation granted as of 1/29/2021.  Patient is stable to discharge and was discharged to a group home with home health.    7/9/2021: Patient is calm and cooperative today, .  Discussed with patient need for debridement,         PATIENT EDUCATION  - Importance of managing edema for healing of ulcer, and for prevention of new ulcer development  -Need for lifelong compression of lower legs   -Elevate legs above the level of the heart periodically throughout the day.  - Importance of adequate nutrition for wound healing  -Advised to go to ER for any increased redness, swelling, drainage or odor, or if patient develops fever, chills, nausea or vomiting.    15 min spent face to face with patient, >50% of time spent counseling, coordinating care, reviewing records,  discussing POC, educating patient regarding vascular disease, wound healing and progression. This time was spent in excess to procedure time.       Please note that this note may have been created using voice recognition software. I have worked with technical experts from [employer] to optimize the interface.  I have made every reasonable attempt to correct obvious errors, but there may be errors of grammar and possibly

## 2021-07-09 NOTE — PATIENT INSTRUCTIONS
-Keep your wound dressing clean, dry, and intact.    -Change your dressing if it becomes soiled, soaked, or falls off.    -Should you experience any significant changes in your wound(s), such as infection (redness, swelling, localized heat, increased pain, fever > 101 F, chills) or have any questions regarding your home care instructions, please contact the wound center at [phone]. If after hours, contact your primary care physician or go to the hospital emergency room.

## 2021-07-12 NOTE — CARE PLAN
Problem: Safety  Goal: Will remain free from injury  Outcome: PROGRESSING AS EXPECTED   Treaded socks in place, bed in the lowest position, 1:1 sitter at bedside, call light and belongings within reach, pt call for assistance appropriately   Problem: Discharge Barriers/Planning  Goal: Patient's continuum of care needs will be met  Outcome: PROGRESSING SLOWER THAN EXPECTED   Pending Guardianship  Problem: Pain Management  Goal: Pain level will decrease to patient's comfort goal  Outcome: PROGRESSING AS EXPECTED      declines

## 2021-07-16 ENCOUNTER — OFFICE VISIT (OUTPATIENT)
Dept: WOUND CARE | Facility: MEDICAL CENTER | Age: 66
End: 2021-07-16
Attending: NURSE PRACTITIONER
Payer: MEDICARE

## 2021-07-16 VITALS
TEMPERATURE: 98.3 F | DIASTOLIC BLOOD PRESSURE: 89 MMHG | HEART RATE: 81 BPM | RESPIRATION RATE: 19 BRPM | OXYGEN SATURATION: 95 % | SYSTOLIC BLOOD PRESSURE: 135 MMHG

## 2021-07-16 DIAGNOSIS — R41.89 COGNITIVE IMPAIRMENT: ICD-10-CM

## 2021-07-16 DIAGNOSIS — R73.03 PRE-DIABETES: ICD-10-CM

## 2021-07-16 DIAGNOSIS — F10.21 ALCOHOLISM IN REMISSION (HCC): ICD-10-CM

## 2021-07-16 DIAGNOSIS — L03.116 CELLULITIS OF LEFT LOWER EXTREMITY: ICD-10-CM

## 2021-07-16 DIAGNOSIS — S81.802A OPEN LEG WOUND, LEFT, INITIAL ENCOUNTER: ICD-10-CM

## 2021-07-16 DIAGNOSIS — S81.802D WOUND OF LEFT LOWER EXTREMITY, SUBSEQUENT ENCOUNTER: Primary | ICD-10-CM

## 2021-07-16 DIAGNOSIS — R52 PAIN: ICD-10-CM

## 2021-07-16 PROCEDURE — 11042 DBRDMT SUBQ TIS 1ST 20SQCM/<: CPT

## 2021-07-16 ASSESSMENT — ENCOUNTER SYMPTOMS
NERVOUS/ANXIOUS: 0
DIARRHEA: 0
FEVER: 0
COUGH: 0
SPUTUM PRODUCTION: 0
HEADACHES: 0
CHILLS: 0
VOMITING: 0
SORE THROAT: 0
MYALGIAS: 0
SINUS PAIN: 0
FALLS: 0
PALPITATIONS: 0
SENSORY CHANGE: 0
SHORTNESS OF BREATH: 0
DIZZINESS: 0
NAUSEA: 0
WEAKNESS: 0
DEPRESSION: 0

## 2021-07-16 ASSESSMENT — LIFESTYLE VARIABLES: SUBSTANCE_ABUSE: 0

## 2021-07-16 NOTE — PROGRESS NOTES
Provider Encounter- Lower Extremity Ulcer      HISTORY OF PRESENT ILLNESS  Wound History:    START OF CARE IN CLINIC: 5/18/2021    REFERRING PROVIDER:  RAFITA Barker       WOUND ETIOLOGY: Trauma   LOCATION: left medial lower extremity, left posterior knee.    HISTORY:  Patient is a 65-year-old male with a past medical history of alcohol dependence, tobacco dependence, psychiatric disorder, and HTN who presented to the emergency department on 8/7/2020 with a left lower extremity wound infection. He was evaluated by orthopedic surgery who recommended ongoing wound care. Wound cultures at that time grew MSSA and Streptococcus. He had been seen at [facility] earlier and was prescribed antibiotics, but never filled the prescription.  An ultrasound of that extremity was done and was negative for DVT.  He was treated for sepsis secondary to cellulitis and completed an antibiotic course on 9/16/2020.  He underwent multiple bedside debridements and aggressive wound care.  Over admission he was also found to have head lice and underwent treatment for that.       A repeat wound culture was done on 9/28/2020 and grew Strep A and Proteus. Infectious disease was consulted and recommended a 5-day course of IV zosyn which was completed on 10/5/2020. On 10/12/2020 the wound care team noticed increased inflammation to the proximal part of the wound bed and switched from a vera flow wound VAC to a regular wound VAC.  ID was again consulted and on 10/14/2020 recommended a 7-day course of antibiotics with meropenem which was completed on 10/22/2020.  On 11/19/2020 he underwent left lower extremity debridement with wound VAC placement.  He continues to undergo wound care and has wound VAC in place.  His wound appears to be healing appropriately.  He will require ongoing wound care outside the hospital.     Additionally, he was noted to have intermittent agitation secondary to underlying psychiatric disorder and was  started on risperdal, depakote, and gabapentin per psychiatry recommendations.  He has remained stable but has been deemed incapacitated to make medical decisions so guardianship was pursued and granted on 1/29/21.  He was deemed  appropriate for group home placement with home health ordered for wound care and wound VAC management.  He was discharged to group home on 5/3/2021 home health coming by and doing wound VAC changes 2 times per week.       Pertinent Medical History:    VASCULAR HISTORY:  alcohol dependence, tobacco dependence, psychiatric disorder, and HTN  Compression: two layer compression  Varicose Veins: None        TOBACCO USE:  Former smoker    Patient's problem list, allergies, and current medications reviewed and updated in Epic    Interval History:  6/4/2021: Initial clinic visit with RAFITA Nelson.  Patient reports he is living in a homeless shelter and home health nurse change his dressings 2 times per week.  He denies fever, chills, nausea, vomiting, chest pain, headache, shortness of breath.  He states that he thinks his wounds appear to be improving.      6/11/2021 : Clinic visit with RAFITA Rueda, FNP-BC, CWOCN, CFCN.  Bola presents today accompanied by his caregiver.  His VAC malfunctioned, a new one has been ordered but not yet delivered.  He is currently living in a group home.  Home health is seeing him 2 times per week in between clinic visits.    6/18/2021 : Clinic visit with Dr. Tim.  Patient was here today for a follow-up visit he was without his caregiver.  He had no complaints concerns or questions or acute medical issues that he related    6/25/2021 : Clinic visit with Dr. Tim.  Patient returns today without a caregiver.  Does not know what has happened to his wound VAC.  We will request home health to inquire if any new  wound VAC was delivered to his group home.  If it is there to restart the wound VAC.  We will reevaluate the Epi Fixx once we know the status  of the wound VAC.    7/9/2021 : Clinic visit with Dr. Tim.  Patient returns today for a follow-up visit without a caregiver.  He does not know where his wound VAC is and not sure he remembers what it was.  We will ask home health to see if they can find it at his group home.  He denies chills fever headache shortness of breath cough chest pain abdominal pain or other acute medical issues.          REVIEW OF SYSTEMS:   Review of Systems   Constitutional: Negative for chills, fever and malaise/fatigue.   HENT: Negative for congestion, sinus pain and sore throat.    Respiratory: Negative for cough, sputum production and shortness of breath.    Cardiovascular: Negative for chest pain, palpitations and leg swelling.   Gastrointestinal: Negative for diarrhea, nausea and vomiting.   Musculoskeletal: Negative for falls and myalgias.   Skin: Negative for itching and rash.   Neurological: Negative for dizziness, sensory change, weakness and headaches.   Psychiatric/Behavioral: Negative for depression and substance abuse. The patient is not nervous/anxious.      PHYSICAL EXAMINATION:   There were no vitals taken for this visit.  Physical Exam  Constitutional:       Appearance: Normal appearance. He is normal weight.      Comments: Well-kept, thin elderly male.   HENT:      Head: Normocephalic and atraumatic.      Nose: Nose normal.   Eyes:      General:         Right eye: No discharge.         Left eye: No discharge.      Extraocular Movements: Extraocular movements intact.      Pupils: Pupils are equal, round, and reactive to light.   Cardiovascular:      Rate and Rhythm: Normal rate and regular rhythm.   Pulmonary:      Effort: Pulmonary effort is normal.      Comments: Respirations even and unlabored, even chest rise and fall  Musculoskeletal:         General: No swelling or tenderness. Normal range of motion.      Cervical back: Normal range of motion. No rigidity.   Skin:     General: Skin is warm and dry.       Findings: No erythema or rash.      Comments: Full-thickness wound to left medial lower leg and left posterior knee.  As are attached, P granular tissue noted.    See flowsheet and photos for further details   Neurological:      Mental Status: He is alert and oriented to person, place, and time. Mental status is at baseline.   Psychiatric:         Mood and Affect: Mood normal.         Behavior: Behavior normal.         WOUND ASSESSMENT     See flow sheet          PROCEDURE:   -2% viscous lidocaine applied topically to wound beds for approximately 5 minutes prior to debridement  -Curette was used to debride the medial wound, the entire surface of the wound was debrided approximately 15.2 cm².  Lateral wounds required no debridement today as they are nearly healed .  -Excisional debridement was performed to remove devitalized tissue until healthy, bleeding tissue was visualized.   Entire surface of medial wound debrided, into the subcutaneous layer.    -Bleeding controlled with manual pressure  -Wound care completed by wound care RN    EPIFIX APPLICATION:   6/4/2021: First application of epi fix mesh 4cm x 4.5cm. [id number], EXP 03/01/2026  REORDER # [id number]    6/11/2021: Second application of epi fix mesh 4cm x 4.5cm. [id number], EXP 03/01/2026  REORDER # [id number]      Pertinent Labs and Diagnostics:    Labs:     IMAGING: No results found.    VASCULAR STUDIES: No results found.     LAST  WOUND CULTURE:   Lab Results   Component Value Date/Time    CULTRSULT Light growth usual skin lotus. (A) 05/18/2021 04:01 PM    CULTRSULT Proteus mirabilis  Moderate growth   (A) 05/18/2021 04:01 PM       ASSESSMENT AND PLAN:     1. Wound of left lower extremity, subsequent encounter    The 7/16/2021:  Excisional debridement of wound in clinic today, medically necessary to promote wound healing.  Lateral wounds are essentially healed medial wound shows some improvement in length but the width at the bases  enlarged  --Patient to return to clinic weekly for assessment and debridement  -Home health to change dressing 1-2 times per week in between clinic visits.    -Continue to monitor for signs and symptoms of infection.  -Wound care completed by the wound care RN        Left posterior knee: Epi fix, Adaptic with Steri-Strips, .  2 layer compression.  Left medial lower extremity: , 2 layer compression.    2. Pain  7/16/2021: Patient has full sensation to bilateral lower extremities.   -2% viscous lidocaine applied topically to wound bed for approximately 5 minutes prior to debridement  -Patient tolerated procedure today with minimal complaints of discomfort.    3. Cognitive impairment  Comment: Patient has a history of intermittent agitation secondary to underlying psychiatric disorder.  Patient taking Risperdal, Depakote, gabapentin per psychiatric recommendations.  Patient is stable but has been deemed incapacitated to make medical decisions and has radiation granted as of 1/29/2021.  Patient is stable to discharge and was discharged to a group home with home health.    7/16/2021: Patient is calm and cooperative today, .  Discussed with patient need for debridement,         PATIENT EDUCATION  - Importance of managing edema for healing of ulcer, and for prevention of new ulcer development  -Need for lifelong compression of lower legs   -Elevate legs above the level of the heart periodically throughout the day.  - Importance of adequate nutrition for wound healing  -Advised to go to ER for any increased redness, swelling, drainage or odor, or if patient develops fever, chills, nausea or vomiting.    10 min spent face to face with patient, >50% of time spent counseling, coordinating care, reviewing records, discussing POC, educating patient regarding vascular disease, wound healing and progression. This time was spent in excess to procedure time.       Please note that this note may have been created using voice  recognition software. I have worked with technical experts from [facility] to optimize the interface.  I have made every reasonable attempt to correct obvious errors, but there may be errors of grammar and possibly

## 2021-07-16 NOTE — PATIENT INSTRUCTIONS
-Keep your wound dressing clean, dry, and intact.    -Change your dressing if it becomes soiled, soaked, or falls off.    -Should you experience any significant changes in your wound(s), such as infection (redness, swelling, localized heat, increased pain, fever > 101 F, chills) or have any questions regarding your home care instructions, please contact the wound center at [phone]. If after hours, contact your primary care physician or go to the hospital emergency room.

## 2021-07-22 PROBLEM — G89.29 CHRONIC PAIN: Status: ACTIVE | Noted: 2021-07-22

## 2021-07-22 PROBLEM — G47.00 INSOMNIA: Status: ACTIVE | Noted: 2021-07-22

## 2021-07-30 ENCOUNTER — OFFICE VISIT (OUTPATIENT)
Dept: WOUND CARE | Facility: MEDICAL CENTER | Age: 66
End: 2021-07-30
Attending: NURSE PRACTITIONER
Payer: MEDICARE

## 2021-07-30 VITALS
RESPIRATION RATE: 17 BRPM | SYSTOLIC BLOOD PRESSURE: 120 MMHG | HEART RATE: 83 BPM | TEMPERATURE: 98 F | OXYGEN SATURATION: 96 % | DIASTOLIC BLOOD PRESSURE: 85 MMHG

## 2021-07-30 DIAGNOSIS — L57.0 ACTINIC KERATOSIS: ICD-10-CM

## 2021-07-30 DIAGNOSIS — R73.03 PRE-DIABETES: ICD-10-CM

## 2021-07-30 DIAGNOSIS — F10.21 ALCOHOLISM IN REMISSION (HCC): ICD-10-CM

## 2021-07-30 DIAGNOSIS — R52 PAIN: ICD-10-CM

## 2021-07-30 DIAGNOSIS — S81.802D WOUND OF LEFT LOWER EXTREMITY, SUBSEQUENT ENCOUNTER: ICD-10-CM

## 2021-07-30 DIAGNOSIS — M24.562 FLEXION CONTRACTURE OF KNEE, LEFT: ICD-10-CM

## 2021-07-30 DIAGNOSIS — R41.89 COGNITIVE IMPAIRMENT: ICD-10-CM

## 2021-07-30 DIAGNOSIS — S81.802A OPEN LEG WOUND, LEFT, INITIAL ENCOUNTER: ICD-10-CM

## 2021-07-30 DIAGNOSIS — L03.116 CELLULITIS OF LEFT LOWER EXTREMITY: ICD-10-CM

## 2021-07-30 PROCEDURE — 99213 OFFICE O/P EST LOW 20 MIN: CPT

## 2021-07-30 ASSESSMENT — ENCOUNTER SYMPTOMS
NAUSEA: 0
DEPRESSION: 0
SORE THROAT: 0
SHORTNESS OF BREATH: 0
SINUS PAIN: 0
FALLS: 0
MYALGIAS: 0
FEVER: 0
NERVOUS/ANXIOUS: 0
CHILLS: 0
HEADACHES: 0
VOMITING: 0
COUGH: 0
PALPITATIONS: 0
DIZZINESS: 0
DIARRHEA: 0
WEAKNESS: 0
SENSORY CHANGE: 0
SPUTUM PRODUCTION: 0

## 2021-07-30 ASSESSMENT — LIFESTYLE VARIABLES: SUBSTANCE_ABUSE: 0

## 2021-07-30 NOTE — PATIENT INSTRUCTIONS
Should you experience any significant changes in your wound(s) such as infection (redness, swelling, localized heat, increased pain, fever >101 F, chills) or have any questions regarding your home care instructions, please contact the wound center [phone]. If after hours, contact your primary care physician or go the hospital emergency room.  Keep dressing clean and dry and cover while bathing. Only change dressing if over saturated, soiled or its falling off.

## 2021-07-30 NOTE — PROGRESS NOTES
Provider Encounter- Lower Extremity Ulcer      HISTORY OF PRESENT ILLNESS  Wound History:    START OF CARE IN CLINIC: 5/18/2021    REFERRING PROVIDER:  RAFITA Barker       WOUND ETIOLOGY: Trauma   LOCATION: left medial lower extremity, left posterior knee.    HISTORY:  Patient is a 65-year-old male with a past medical history of alcohol dependence, tobacco dependence, psychiatric disorder, and HTN who presented to the emergency department on 8/7/2020 with a left lower extremity wound infection. He was evaluated by orthopedic surgery who recommended ongoing wound care. Wound cultures at that time grew MSSA and Streptococcus. He had been seen at [facility] earlier and was prescribed antibiotics, but never filled the prescription.  An ultrasound of that extremity was done and was negative for DVT.  He was treated for sepsis secondary to cellulitis and completed an antibiotic course on 9/16/2020.  He underwent multiple bedside debridements and aggressive wound care.  Over admission he was also found to have head lice and underwent treatment for that.       A repeat wound culture was done on 9/28/2020 and grew Strep A and Proteus. Infectious disease was consulted and recommended a 5-day course of IV zosyn which was completed on 10/5/2020. On 10/12/2020 the wound care team noticed increased inflammation to the proximal part of the wound bed and switched from a vera flow wound VAC to a regular wound VAC.  ID was again consulted and on 10/14/2020 recommended a 7-day course of antibiotics with meropenem which was completed on 10/22/2020.  On 11/19/2020 he underwent left lower extremity debridement with wound VAC placement.  He continues to undergo wound care and has wound VAC in place.  His wound appears to be healing appropriately.  He will require ongoing wound care outside the hospital.     Additionally, he was noted to have intermittent agitation secondary to underlying psychiatric disorder and was  started on risperdal, depakote, and gabapentin per psychiatry recommendations.  He has remained stable but has been deemed incapacitated to make medical decisions so guardianship was pursued and granted on 1/29/21.  He was deemed  appropriate for group home placement with home health ordered for wound care and wound VAC management.  He was discharged to group home on 5/3/2021 home health coming by and doing wound VAC changes 2 times per week.       Pertinent Medical History:    VASCULAR HISTORY:  alcohol dependence, tobacco dependence, psychiatric disorder, and HTN  Compression: two layer compression  Varicose Veins: None        TOBACCO USE:  Former smoker    Patient's problem list, allergies, and current medications reviewed and updated in Epic    Interval History:  6/4/2021: Initial clinic visit with RAFITA Nelson.  Patient reports he is living in a homeless shelter and home health nurse change his dressings 2 times per week.  He denies fever, chills, nausea, vomiting, chest pain, headache, shortness of breath.  He states that he thinks his wounds appear to be improving.      6/11/2021 : Clinic visit with RAFITA Rueda, FNP-BC, CWOCN, CFCN.  Bola presents today accompanied by his caregiver.  His VAC malfunctioned, a new one has been ordered but not yet delivered.  He is currently living in a group home.  Home health is seeing him 2 times per week in between clinic visits.    6/18/2021 : Clinic visit with Dr. Tim.  Patient was here today for a follow-up visit he was without his caregiver.  He had no complaints concerns or questions or acute medical issues that he related    6/25/2021 : Clinic visit with Dr. Tim.  Patient returns today without a caregiver.  Does not know what has happened to his wound VAC.  We will request home health to inquire if any new  wound VAC was delivered to his group home.  If it is there to restart the wound VAC.  We will reevaluate the Epi Fixx once we know the status  of the wound VAC.    7/9/2021 : Clinic visit with Dr. Tim.  Patient returns today for a follow-up visit without a caregiver.  He does not know where his wound VAC is and not sure he remembers what it was.  We will ask home health to see if they can find it at his group home.  He denies chills fever headache shortness of breath cough chest pain abdominal pain or other acute medical issues.    7/30/2021 : Clinic visit with Dr. Tim.  Returns for a follow-up visit he was accompanied by a caregiver at his assisted living facility.  He had no complaints concerns or questions.  The assistant said that he could not tolerate the wound VAC but he does not need it anymore and it should be returned to the provider.    REVIEW OF SYSTEMS:   Review of Systems   Constitutional: Negative for chills, fever and malaise/fatigue.   HENT: Negative for congestion, sinus pain and sore throat.    Respiratory: Negative for cough, sputum production and shortness of breath.    Cardiovascular: Negative for chest pain, palpitations and leg swelling.   Gastrointestinal: Negative for diarrhea, nausea and vomiting.   Musculoskeletal: Negative for falls and myalgias.   Skin: Negative for itching and rash.   Neurological: Negative for dizziness, sensory change, weakness and headaches.   Psychiatric/Behavioral: Negative for depression and substance abuse. The patient is not nervous/anxious.      PHYSICAL EXAMINATION:   There were no vitals taken for this visit.  Physical Exam  Constitutional:       Appearance: Normal appearance. He is normal weight.      Comments: Well-kept, thin elderly male.   HENT:      Head: Normocephalic and atraumatic.      Nose: Nose normal.   Eyes:      General:         Right eye: No discharge.         Left eye: No discharge.      Extraocular Movements: Extraocular movements intact.      Pupils: Pupils are equal, round, and reactive to light.   Cardiovascular:      Rate and Rhythm: Normal rate and regular rhythm.    Pulmonary:      Effort: Pulmonary effort is normal.      Comments: Respirations even and unlabored, even chest rise and fall  Musculoskeletal:         General: No swelling or tenderness. Normal range of motion.      Cervical back: Normal range of motion. No rigidity.   Skin:     General: Skin is warm and dry.      Findings: No erythema or rash.      Comments: Full-thickness wound to left medial lower leg and left posterior knee.  As are attached, P granular tissue noted.    See flowsheet and photos for further details   Neurological:      Mental Status: He is alert and oriented to person, place, and time. Mental status is at baseline.   Psychiatric:         Mood and Affect: Mood normal.         Behavior: Behavior normal.         WOUND ASSESSMENT  See flow sheet     PROCEDURE;  -The lateral medial wounds are nearly completely healed this week no surgical debridement was indicated  --Wound care completed by wound care RN    EPIFIX APPLICATION:   6/4/2021: First application of epi fix mesh 4cm x 4.5cm. [id number], EXP 03/01/2026  REORDER # [id number]    6/11/2021: Second application of epi fix mesh 4cm x 4.5cm. [id number], EXP 03/01/2026  REORDER # [id number]      Pertinent Labs and Diagnostics:    Labs:     IMAGING: No results found.    VASCULAR STUDIES: No results found.     LAST  WOUND CULTURE:   Lab Results   Component Value Date/Time    CULTRSULT Light growth usual skin lotus. (A) 05/18/2021 04:01 PM    CULTRSULT Proteus mirabilis  Moderate growth   (A) 05/18/2021 04:01 PM       ASSESSMENT AND PLAN:     1. Wound of left lower extremity, subsequent encounter    7/30/2021:-Home health to change dressing 1 time next week   -Continue to monitor for signs and symptoms of infection.  -Wound care completed by the wound care RN  -Patient's wounds are nearly healed did not require debridement today but would recommend 1 more visit to ensure complete healing and no other issues.  -Was discussed with the facility  care worker who was accompanying him.    Left posterior knee: Epi fix, Adaptic with Steri-Strips, .  2 layer compression.  Left medial lower extremity: , 2 layer compression.    2. Pain:  7/30/2021: Patient had no issues today with pain is no curettement was necessary    3. Cognitive impairment  Comment: Patient has a history of intermittent agitation secondary to underlying psychiatric disorder.  Patient taking Risperdal, Depakote, gabapentin per psychiatric recommendations.  Patient is stable but has been deemed incapacitated to make medical decisions and has radiation granted as of 1/29/2021.  Patient is stable to discharge and was discharged to a group home with home health.    7/30/2021: Patient is calm and cooperative today, .     PATIENT EDUCATION  - Importance of managing edema for healing of ulcer, and for prevention of new ulcer development  -Need for lifelong compression of lower legs   -Elevate legs above the level of the heart periodically throughout the day.  - Importance of adequate nutrition for wound healing  -Advised to go to ER for any increased redness, swelling, drainage or odor, or if patient develops fever, chills, nausea or vomiting.    10 min spent face to face with patient, >50% of time spent counseling, coordinating care, reviewing records, discussing POC, educating patient regarding vascular disease, wound healing and progression. This time was spent in excess to procedure time.       Please note that this note may have been created using voice recognition software. I have worked with technical experts from [employer] to optimize the interface.  I have made every reasonable attempt to correct obvious errors, but there may be errors of grammar and possibly

## 2021-08-06 ENCOUNTER — OFFICE VISIT (OUTPATIENT)
Dept: WOUND CARE | Facility: MEDICAL CENTER | Age: 66
End: 2021-08-06
Attending: NURSE PRACTITIONER
Payer: MEDICARE

## 2021-08-06 VITALS
OXYGEN SATURATION: 96 % | SYSTOLIC BLOOD PRESSURE: 113 MMHG | TEMPERATURE: 97.4 F | HEART RATE: 82 BPM | DIASTOLIC BLOOD PRESSURE: 75 MMHG | RESPIRATION RATE: 17 BRPM

## 2021-08-06 DIAGNOSIS — F10.21 ALCOHOLISM IN REMISSION (HCC): ICD-10-CM

## 2021-08-06 DIAGNOSIS — S81.802D WOUND OF LEFT LOWER EXTREMITY, SUBSEQUENT ENCOUNTER: Primary | ICD-10-CM

## 2021-08-06 DIAGNOSIS — R41.89 COGNITIVE IMPAIRMENT: ICD-10-CM

## 2021-08-06 DIAGNOSIS — M24.562 FLEXION CONTRACTURE OF KNEE, LEFT: ICD-10-CM

## 2021-08-06 DIAGNOSIS — L57.0 ACTINIC KERATOSIS: ICD-10-CM

## 2021-08-06 DIAGNOSIS — R73.03 PRE-DIABETES: ICD-10-CM

## 2021-08-06 DIAGNOSIS — R52 PAIN: ICD-10-CM

## 2021-08-06 DIAGNOSIS — S81.802A OPEN LEG WOUND, LEFT, INITIAL ENCOUNTER: ICD-10-CM

## 2021-08-06 PROCEDURE — 11042 DBRDMT SUBQ TIS 1ST 20SQCM/<: CPT

## 2021-08-06 ASSESSMENT — ENCOUNTER SYMPTOMS
COUGH: 0
CHILLS: 0
SENSORY CHANGE: 0
DIARRHEA: 0
FEVER: 0
PALPITATIONS: 0
SINUS PAIN: 0
NERVOUS/ANXIOUS: 0
SHORTNESS OF BREATH: 0
FALLS: 0
MYALGIAS: 0
VOMITING: 0
DEPRESSION: 0
SORE THROAT: 0
HEADACHES: 0
WEAKNESS: 0
DIZZINESS: 0
SPUTUM PRODUCTION: 0
NAUSEA: 0

## 2021-08-06 ASSESSMENT — LIFESTYLE VARIABLES: SUBSTANCE_ABUSE: 0

## 2021-08-06 NOTE — PATIENT INSTRUCTIONS
-Keep your wound dressing clean, dry, and intact.    -Change your dressing if it becomes soiled, soaked, or falls off.    -Should you experience any significant changes in your wound(s), such as infection (redness, swelling, localized heat, increased pain, fever > 101 F, chills) or have any questions regarding your home care instructions, please contact the wound center at [phone]. If after hours, contact your primary care physician or go to the hospital emergency room.

## 2021-08-06 NOTE — PROGRESS NOTES
Provider Encounter- Lower Extremity Ulcer      HISTORY OF PRESENT ILLNESS  Wound History:    START OF CARE IN CLINIC: 5/18/2021    REFERRING PROVIDER:  RAFITA Barker       WOUND ETIOLOGY: Trauma   LOCATION: left medial lower extremity, left posterior knee.    HISTORY:  Patient is a 65-year-old male with a past medical history of alcohol dependence, tobacco dependence, psychiatric disorder, and HTN who presented to the emergency department on 8/7/2020 with a left lower extremity wound infection. He was evaluated by orthopedic surgery who recommended ongoing wound care. Wound cultures at that time grew MSSA and Streptococcus. He had been seen at [facility] earlier and was prescribed antibiotics, but never filled the prescription.  An ultrasound of that extremity was done and was negative for DVT.  He was treated for sepsis secondary to cellulitis and completed an antibiotic course on 9/16/2020.  He underwent multiple bedside debridements and aggressive wound care.  Over admission he was also found to have head lice and underwent treatment for that.       A repeat wound culture was done on 9/28/2020 and grew Strep A and Proteus. Infectious disease was consulted and recommended a 5-day course of IV zosyn which was completed on 10/5/2020. On 10/12/2020 the wound care team noticed increased inflammation to the proximal part of the wound bed and switched from a vera flow wound VAC to a regular wound VAC.  ID was again consulted and on 10/14/2020 recommended a 7-day course of antibiotics with meropenem which was completed on 10/22/2020.  On 11/19/2020 he underwent left lower extremity debridement with wound VAC placement.  He continues to undergo wound care and has wound VAC in place.  His wound appears to be healing appropriately.  He will require ongoing wound care outside the hospital.     Additionally, he was noted to have intermittent agitation secondary to underlying psychiatric disorder and was  started on risperdal, depakote, and gabapentin per psychiatry recommendations.  He has remained stable but has been deemed incapacitated to make medical decisions so guardianship was pursued and granted on 1/29/21.  He was deemed  appropriate for group home placement with home health ordered for wound care and wound VAC management.  He was discharged to group home on 5/3/2021 home health coming by and doing wound VAC changes 2 times per week.       Pertinent Medical History:    VASCULAR HISTORY:  alcohol dependence, tobacco dependence, psychiatric disorder, and HTN  Compression: two layer compression  Varicose Veins: None        TOBACCO USE:  Former smoker    Patient's problem list, allergies, and current medications reviewed and updated in Epic    Interval History:  6/4/2021: Initial clinic visit with RAFITA Nelson.  Patient reports he is living in a homeless shelter and home health nurse change his dressings 2 times per week.  He denies fever, chills, nausea, vomiting, chest pain, headache, shortness of breath.  He states that he thinks his wounds appear to be improving.      6/11/2021 : Clinic visit with RAFITA Rueda, FNP-BC, CWOCN, CFCN.  Bola presents today accompanied by his caregiver.  His VAC malfunctioned, a new one has been ordered but not yet delivered.  He is currently living in a group home.  Home health is seeing him 2 times per week in between clinic visits.    6/18/2021 : Clinic visit with Dr. Tim.  Patient was here today for a follow-up visit he was without his caregiver.  He had no complaints concerns or questions or acute medical issues that he related    6/25/2021 : Clinic visit with Dr. Tim.  Patient returns today without a caregiver.  Does not know what has happened to his wound VAC.  We will request home health to inquire if any new  wound VAC was delivered to his group home.  If it is there to restart the wound VAC.  We will reevaluate the Epi Fixx once we know the status  of the wound VAC.    7/9/2021 : Clinic visit with Dr. Tim.  Patient returns today for a follow-up visit without a caregiver.  He does not know where his wound VAC is and not sure he remembers what it was.  We will ask home health to see if they can find it at his group home.  He denies chills fever headache shortness of breath cough chest pain abdominal pain or other acute medical issues.    7/30/2021 : Clinic visit with Dr. Tim.  Returns for a follow-up visit he was accompanied by a caregiver at his assisted living facility.  He had no complaints concerns or questions.  The assistant said that he could not tolerate the wound VAC but he does not need it anymore and it should be returned to the provider.    8/6/2021 : Clinic visit with Dr. Tim.  Patient returns for a follow-up visit accompanied by caregiver from the assisted living facility.  He has no concerns or questions denies headaches chills fever cough chest pain abdominal pain or acute medical issue..      REVIEW OF SYSTEMS:   Review of Systems   Constitutional: Negative for chills, fever and malaise/fatigue.   HENT: Negative for congestion, sinus pain and sore throat.    Respiratory: Negative for cough, sputum production and shortness of breath.    Cardiovascular: Negative for chest pain, palpitations and leg swelling.   Gastrointestinal: Negative for diarrhea, nausea and vomiting.   Musculoskeletal: Negative for falls and myalgias.   Skin: Negative for itching and rash.   Neurological: Negative for dizziness, sensory change, weakness and headaches.   Psychiatric/Behavioral: Negative for depression and substance abuse. The patient is not nervous/anxious.      PHYSICAL EXAMINATION:   There were no vitals taken for this visit.  Physical Exam  Constitutional:       Appearance: Normal appearance. He is normal weight.      Comments: Well-kept, thin elderly male.   HENT:      Head: Normocephalic and atraumatic.      Nose: Nose normal.   Eyes:       General:         Right eye: No discharge.         Left eye: No discharge.      Extraocular Movements: Extraocular movements intact.      Pupils: Pupils are equal, round, and reactive to light.   Cardiovascular:      Rate and Rhythm: Normal rate and regular rhythm.   Pulmonary:      Effort: Pulmonary effort is normal.      Comments: Respirations even and unlabored, even chest rise and fall  Musculoskeletal:         General: No swelling or tenderness. Normal range of motion.      Cervical back: Normal range of motion. No rigidity.   Skin:     General: Skin is warm and dry.      Findings: No erythema or rash.      Comments: Full-thickness wound to left medial lower leg and left posterior knee.  As are attached, P granular tissue noted.    See flowsheet and photos for further details   Neurological:      Mental Status: He is alert and oriented to person, place, and time. Mental status is at baseline.   Psychiatric:         Mood and Affect: Mood normal.         Behavior: Behavior normal.         WOUND ASSESSMENT        Negative Pressure Wound Therapy 11/20/20 Leg Lateral;Posterior;Medial Left (Active)           Wound 11/19/20 Full Thickness Wound Leg Lateral;Posterior;Medial Left (Active)       Wound 05/18/21 Traumatic Knee;Thigh Posterior Left --Left Popliteal Fossa/Posterior Thigh (Active)   Wound Image    08/06/21 1100   Site Assessment Red;Yellow 08/06/21 1100   Periwound Assessment Scar tissue;Dry 08/06/21 1100   Margins Attached edges 08/06/21 1100   Closure Secondary intention 05/18/21 1700   Drainage Amount Moderate 08/06/21 1100   Drainage Description Serosanguineous 08/06/21 1100   Treatments Cleansed;Topical Lidocaine;Provider debridement 08/06/21 1100   Wound Cleansing Normal Saline Irrigation 08/06/21 1100   Periwound Protectant Skin Protectant Wipes to Periwound;Barrier Paste 08/06/21 1100   Dressing Cleansing/Solutions Not Applicable 08/06/21 1100   Dressing Options Hydrofera Blue Ready;Hypafix Tape  08/06/21 1100   Dressing Changed Changed 07/16/21 1600   Dressing Status Clean;Dry;Intact 05/18/21 1700   Dressing Change/Treatment Frequency Every 72 hrs, and As Needed 07/02/21 1553   Non-staged Wound Description Full thickness 08/06/21 1100   Wound Length (cm) 4.4 cm 08/06/21 1100   Wound Width (cm) 1.8 cm 08/06/21 1100   Wound Depth (cm) 0.1 cm 08/06/21 1100   Wound Surface Area (cm^2) 7.92 cm^2 08/06/21 1100   Wound Volume (cm^3) 0.79 cm^3 08/06/21 1100   Post-Procedure Length (cm) 5.9 cm 08/06/21 1100   Post-Procedure Width (cm) 1.8 cm 08/06/21 1100   Post-Procedure Depth (cm) 0.1 cm 08/06/21 1100   Post-Procedure Surface Area (cm^2) 10.62 cm^2 08/06/21 1100   Post-Procedure Volume (cm^3) 1.06 cm^3 08/06/21 1100   Wound Healing % 91 08/06/21 1100   Wound Bed Granulation (%) 100 % 07/30/21 1600   Wound Bed Slough (%) 30 % 06/25/21 1400   Wound Bed Granulation (%) - Post-Procedure 100 % 07/02/21 1553   Wound Bed Slough % - (Post-Procedure) 30 % 06/25/21 1400   Tunneling (cm) 0 cm 08/06/21 1100   Undermining (cm) 0 cm 08/06/21 1100   Wound Odor None 08/06/21 1100   Pulses 2+;DP;PT;Doppler 05/18/21 1700   Exposed Structures None 08/06/21 1100         PROCEDURE;  -2% lidocaine was applied to the wound and allowed to set for approximately 5 minutes careful inspection reveals the wound is nearly healed but several areas required debridement.  Thi  -Millimeters curette was used to debride the areas into the subcutaneous tissue.  Area debrided  was approximately 10.62 cm² hemostasis was achieved with manual pressure.      --Wound care completed by wound care RN    EPIFIX APPLICATION:   6/4/2021: First application of epi fix mesh 4cm x 4.5cm. [id number], EXP 03/01/2026  REORDER # [id number]    6/11/2021: Second application of epi fix mesh 4cm x 4.5cm. [id number], EXP 03/01/2026  REORDER # [id number]      Pertinent Labs and Diagnostics:    Labs:     IMAGING: No results found.    VASCULAR STUDIES: No results  found.     LAST  WOUND CULTURE:   Lab Results   Component Value Date/Time    CULTRSULT Light growth usual skin lotus. (A) 05/18/2021 04:01 PM    CULTRSULT Proteus mirabilis  Moderate growth   (A) 05/18/2021 04:01 PM       ASSESSMENT AND PLAN:     1. Wound of left lower extremity, subsequent encounter    7/30/2021:-Home health to change dressing 1 time next week   -Continue to monitor for signs and symptoms of infection.  -Wound care completed by the wound care RN  -Was discussed with the facility care worker who was accompanying him.  -He also has a superficial abrasion on his right knee as a result of a fall while playing with his dog he states today it needs no further care other than observation   -left posterior knee: Epi fix, Adaptic with Steri-Strips, .  2 layer compression.  Left medial lower extremity: , 2 layer compression.    2. Pain:  7/30/2021: Patient had no issues today with pain is no curettement was necessary    3. Cognitive impairment  Comment: Patient has a history of intermittent agitation secondary to underlying psychiatric disorder.  Patient taking Risperdal, Depakote, gabapentin per psychiatric recommendations.  Patient is stable but has been deemed incapacitated to make medical decisions and has radiation granted as of 1/29/2021.  Patient is stable to discharge and was discharged to a group home with home health.    7/30/2021: Patient is calm and cooperative today, .     PATIENT EDUCATION  - Importance of managing edema for healing of ulcer, and for prevention of new ulcer development  -Need for lifelong compression of lower legs   -Elevate legs above the level of the heart periodically throughout the day.  - Importance of adequate nutrition for wound healing  -Advised to go to ER for any increased redness, swelling, drainage or odor, or if patient develops fever, chills, nausea or vomiting.    10 min spent face to face with patient, >50% of time spent counseling, coordinating care, reviewing  records, discussing POC, educating patient regarding vascular disease, wound healing and progression. This time was spent in excess to procedure time.       Please note that this note may have been created using voice recognition software. I have worked with technical experts from [employer] [employer] to optimize the interface.  I have made every reasonable attempt to correct obvious errors, but there may be errors of grammar and possibly

## 2021-08-13 ENCOUNTER — OFFICE VISIT (OUTPATIENT)
Dept: WOUND CARE | Facility: MEDICAL CENTER | Age: 66
End: 2021-08-13
Attending: NURSE PRACTITIONER
Payer: MEDICARE

## 2021-08-13 DIAGNOSIS — S81.802D WOUND OF LEFT LOWER EXTREMITY, SUBSEQUENT ENCOUNTER: ICD-10-CM

## 2021-08-20 ENCOUNTER — OFFICE VISIT (OUTPATIENT)
Dept: WOUND CARE | Facility: MEDICAL CENTER | Age: 66
End: 2021-08-20
Attending: NURSE PRACTITIONER
Payer: MEDICARE

## 2021-08-20 VITALS
OXYGEN SATURATION: 96 % | HEART RATE: 90 BPM | SYSTOLIC BLOOD PRESSURE: 130 MMHG | DIASTOLIC BLOOD PRESSURE: 94 MMHG | RESPIRATION RATE: 18 BRPM | TEMPERATURE: 97.1 F

## 2021-08-20 DIAGNOSIS — R52 PAIN: ICD-10-CM

## 2021-08-20 DIAGNOSIS — R41.89 COGNITIVE IMPAIRMENT: ICD-10-CM

## 2021-08-20 DIAGNOSIS — S81.802D WOUND OF LEFT LOWER EXTREMITY, SUBSEQUENT ENCOUNTER: Primary | ICD-10-CM

## 2021-08-20 PROCEDURE — 11042 DBRDMT SUBQ TIS 1ST 20SQCM/<: CPT | Performed by: NURSE PRACTITIONER

## 2021-08-20 PROCEDURE — 99213 OFFICE O/P EST LOW 20 MIN: CPT | Mod: 25 | Performed by: NURSE PRACTITIONER

## 2021-08-20 PROCEDURE — 11042 DBRDMT SUBQ TIS 1ST 20SQCM/<: CPT

## 2021-08-20 PROCEDURE — 99213 OFFICE O/P EST LOW 20 MIN: CPT | Mod: 25

## 2021-08-20 ASSESSMENT — ENCOUNTER SYMPTOMS
DIZZINESS: 0
FALLS: 0
HEADACHES: 0
FEVER: 0
COUGH: 0
WEAKNESS: 0
DIARRHEA: 0
MYALGIAS: 0
SINUS PAIN: 0
SORE THROAT: 0
VOMITING: 0
SPUTUM PRODUCTION: 0
NAUSEA: 0
SHORTNESS OF BREATH: 0
SENSORY CHANGE: 0
NERVOUS/ANXIOUS: 0
CHILLS: 0
PALPITATIONS: 0
DEPRESSION: 0

## 2021-08-20 ASSESSMENT — LIFESTYLE VARIABLES: SUBSTANCE_ABUSE: 0

## 2021-08-20 NOTE — PATIENT INSTRUCTIONS
Should you experience any significant changes in your wound(s) such as infection (redness, swelling, localized heat, increased pain, fever >101 F, chills) or have any questions regarding your home care instructions, please contact the wound center [phone]. If after hours, contact your primary care physician or go the hospital emergency room.  Keep dressing clean and dry and cover while bathing. Only change dressing if over saturated, soiled or its falling off.

## 2021-08-20 NOTE — PROGRESS NOTES
Provider Encounter- Lower Extremity Ulcer      HISTORY OF PRESENT ILLNESS  Wound History:    START OF CARE IN CLINIC: 5/18/2021    REFERRING PROVIDER:  RAFITA Barker       WOUND ETIOLOGY: Trauma   LOCATION: left medial lower extremity, left posterior knee.                                    Full-thickness wound distal from original wound noted in clinic on 8/20/2021   HISTORY:Patient is a 65-year-old male with a past medical history of alcohol dependence, tobacco dependence, psychiatric disorder, and HTN who presented to the emergency department on 8/7/2020 with a left lower extremity wound infection. He was evaluated by orthopedic surgery who recommended ongoing wound care. Wound cultures at that time grew MSSA and Streptococcus. He had been seen at [facility] earlier and was prescribed antibiotics, but never filled the prescription.  An ultrasound of that extremity was done and was negative for DVT.  He was treated for sepsis secondary to cellulitis and completed an antibiotic course on 9/16/2020.  He underwent multiple bedside debridements and aggressive wound care.  Over admission he was also found to have head lice and underwent treatment for that.       A repeat wound culture was done on 9/28/2020 and grew Strep A and Proteus. Infectious disease was consulted and recommended a 5-day course of IV zosyn which was completed on 10/5/2020. On 10/12/2020 the wound care team noticed increased inflammation to the proximal part of the wound bed and switched from a vera flow wound VAC to a regular wound VAC.  ID was again consulted and on 10/14/2020 recommended a 7-day course of antibiotics with meropenem which was completed on 10/22/2020.  On 11/19/2020 he underwent left lower extremity debridement with wound VAC placement.  He continues to undergo wound care and has wound VAC in place.  His wound appears to be healing appropriately.  He will require ongoing wound care outside the  hospital.     Additionally, he was noted to have intermittent agitation secondary to underlying psychiatric disorder and was started on risperdal, depakote, and gabapentin per psychiatry recommendations.  He has remained stable but has been deemed incapacitated to make medical decisions so guardianship was pursued and granted on 1/29/21.  He was deemed  appropriate for group home placement with home health ordered for wound care and wound VAC management.  He was discharged to group home on 5/3/2021 home health coming by and doing wound VAC changes 2 times per week.    New wound noted in clinic on 8/20/2021.  Full-thickness wound distal from original wound.  Treatment initiated in clinic       Pertinent Medical History:    VASCULAR HISTORY:  alcohol dependence, tobacco dependence, psychiatric disorder, and HTN  Compression: two layer compression  Varicose Veins: None        TOBACCO USE:  Former smoker    Patient's problem list, allergies, and current medications reviewed and updated in Epic    Interval History:  6/4/2021: Initial clinic visit with RAFITA Nelson.  Patient reports he is living in a homeless shelter and home health nurse change his dressings 2 times per week.  He denies fever, chills, nausea, vomiting, chest pain, headache, shortness of breath.  He states that he thinks his wounds appear to be improving.      6/11/2021 : Clinic visit with RAFITA Rueda, FNP-BC, CWOCN, CFCN.  Bola presents today accompanied by his caregiver.  His VAC malfunctioned, a new one has been ordered but not yet delivered.  He is currently living in a group home.  Home health is seeing him 2 times per week in between clinic visits.    6/18/2021 : Clinic visit with Dr. Tim.  Patient was here today for a follow-up visit he was without his caregiver.  He had no complaints concerns or questions or acute medical issues that he related    6/25/2021 : Clinic visit with Dr. Tim.  Patient returns today without a  caregiver.  Does not know what has happened to his wound VAC.  We will request home health to inquire if any new  wound VAC was delivered to his group home.  If it is there to restart the wound VAC.  We will reevaluate the Epi Fixx once we know the status of the wound VAC.    7/9/2021 : Clinic visit with Dr. Tim.  Patient returns today for a follow-up visit without a caregiver.  He does not know where his wound VAC is and not sure he remembers what it was.  We will ask home health to see if they can find it at his group home.  He denies chills fever headache shortness of breath cough chest pain abdominal pain or other acute medical issues.    7/30/2021 : Clinic visit with Dr. Tim.  Returns for a follow-up visit he was accompanied by a caregiver at his assisted living facility.  He had no complaints concerns or questions.  The assistant said that he could not tolerate the wound VAC but he does not need it anymore and it should be returned to the provider.    8/6/2021 : Clinic visit with Dr. Tim.  Patient returns for a follow-up visit accompanied by caregiver from the assisted living facility.  He has no concerns or questions denies headaches chills fever cough chest pain abdominal pain or acute medical issue.    8/20/2021 : Clinic visit with RAFITA Rueda, FNP-BC, CWOCN, CFCN.  Bola presents today unaccompanied.  He states he is feeling well.  He has a new full-thickness wound, distal from the original.  Treatment initiated in clinic today      REVIEW OF SYSTEMS:   Review of Systems   Constitutional: Negative for chills, fever and malaise/fatigue.   HENT: Negative for congestion, sinus pain and sore throat.    Respiratory: Negative for cough, sputum production and shortness of breath.    Cardiovascular: Negative for chest pain, palpitations and leg swelling.   Gastrointestinal: Negative for diarrhea, nausea and vomiting.   Musculoskeletal: Negative for falls and myalgias.   Skin: Negative for  itching and rash.   Neurological: Negative for dizziness, sensory change, weakness and headaches.   Psychiatric/Behavioral: Negative for depression and substance abuse. The patient is not nervous/anxious.      PHYSICAL EXAMINATION:   /94 (BP Location: Left arm, Patient Position: Sitting)   Pulse 90   Temp 36.2 °C (97.1 °F) (Temporal)   Resp 18   SpO2 96%   Physical Exam  Constitutional:       Appearance: Normal appearance. He is normal weight.      Comments: Well-kept, thin elderly male.   HENT:      Head: Normocephalic and atraumatic.      Nose: Nose normal.   Eyes:      General:         Right eye: No discharge.         Left eye: No discharge.      Extraocular Movements: Extraocular movements intact.      Pupils: Pupils are equal, round, and reactive to light.   Cardiovascular:      Rate and Rhythm: Normal rate and regular rhythm.   Pulmonary:      Effort: Pulmonary effort is normal.      Comments: Respirations even and unlabored, even chest rise and fall  Musculoskeletal:         General: No swelling or tenderness. Normal range of motion.      Cervical back: Normal range of motion. No rigidity.   Skin:     General: Skin is warm and dry.      Findings: No erythema or rash.      Comments: Full-thickness wound to left medial lower leg and left posterior knee.  As are attached, P granular tissue noted.    See flowsheet and photos for further details   Neurological:      Mental Status: He is alert and oriented to person, place, and time. Mental status is at baseline.   Psychiatric:         Mood and Affect: Mood normal.         Behavior: Behavior normal.         WOUND ASSESSMENT  Negative Pressure Wound Therapy 11/20/20 Leg Lateral;Posterior;Medial Left (Active)           Wound 11/19/20 Full Thickness Wound Leg Lateral;Posterior;Medial Left (Active)       Wound 05/18/21 Traumatic Knee;Thigh Posterior Left --Left Popliteal Fossa/Posterior Thigh (Active)   Wound Image    08/20/21 1500   Site Assessment  Red;Yellow 08/20/21 1500   Periwound Assessment Edema;Scar tissue;Fragile 08/20/21 1500   Margins Attached edges 08/20/21 1500   Closure Secondary intention 05/18/21 1700   Drainage Amount Moderate 08/20/21 1500   Drainage Description Serosanguineous 08/20/21 1500   Treatments Cleansed;Topical Lidocaine;Provider debridement;Site care 08/20/21 1500   Wound Cleansing Puracyn [place] 08/20/21 1500   Periwound Protectant Skin Protectant Wipes to Periwound;Barrier Paste 08/20/21 1500   Dressing Cleansing/Solutions Not Applicable 08/20/21 1500   Dressing Options Hydrofera Blue Ready;Hypafix Tape 08/20/21 1500   Dressing Changed Changed 07/16/21 1600   Dressing Status Clean;Dry;Intact 05/18/21 1700   Dressing Change/Treatment Frequency Every 72 hrs, and As Needed 07/02/21 1553   Non-staged Wound Description Full thickness 08/20/21 1500   Wound Length (cm) 6.5 cm 08/20/21 1500   Wound Width (cm) 1.3 cm 08/20/21 1500   Wound Depth (cm) 0.1 cm 08/20/21 1500   Wound Surface Area (cm^2) 8.45 cm^2 08/20/21 1500   Wound Volume (cm^3) 0.845 cm^3 08/20/21 1500   Post-Procedure Length (cm) 7 cm 08/20/21 1500   Post-Procedure Width (cm) 1.5 cm 08/20/21 1500   Post-Procedure Depth (cm) 0.1 cm 08/20/21 1500   Post-Procedure Surface Area (cm^2) 10.5 cm^2 08/20/21 1500   Post-Procedure Volume (cm^3) 1.05 cm^3 08/20/21 1500   Wound Healing % 91 08/20/21 1500   Wound Bed Granulation (%) 60 % 08/20/21 1500   Wound Bed Slough (%) 40 % 08/20/21 1500   Wound Bed Granulation (%) - Post-Procedure 100 % 07/02/21 1553   Wound Bed Slough % - (Post-Procedure) 30 % 06/25/21 1400   Tunneling (cm) 0 cm 08/20/21 1500   Undermining (cm) 0 cm 08/20/21 1500   Wound Odor None 08/20/21 1500   Pulses 2+;DP;PT;Doppler 05/18/21 1700   Exposed Structures None 08/20/21 1500       Wound 08/20/21 LLE Posterior (Active)   Wound Image    08/20/21 1500   Site Assessment Pink;Yellow 08/20/21 1500   Periwound Assessment Edema;Maceration;Scar tissue 08/20/21 1500   Margins  Attached edges 08/20/21 1500   Drainage Amount Small 08/20/21 1500   Drainage Description Serosanguineous 08/20/21 1500   Treatments Cleansed;Topical Lidocaine;Provider debridement;Site care 08/20/21 1500   Wound Cleansing Puracyn [place] 08/20/21 1500   Periwound Protectant Skin Protectant Wipes to Periwound;Barrier Paste 08/20/21 1500   Dressing Cleansing/Solutions Not Applicable 08/20/21 1500   Dressing Options Hydrofera Blue Ready;Hypafix Tape;Tubigrip 08/20/21 1500   Non-staged Wound Description Full thickness 08/20/21 1500   Wound Length (cm) 1.5 cm 08/20/21 1500   Wound Width (cm) 1 cm 08/20/21 1500   Wound Depth (cm) 0.1 cm 08/20/21 1500   Wound Surface Area (cm^2) 1.5 cm^2 08/20/21 1500   Wound Volume (cm^3) 0.15 cm^3 08/20/21 1500   Post-Procedure Length (cm) 1.3 cm 08/20/21 1500   Post-Procedure Width (cm) 1.5 cm 08/20/21 1500   Post-Procedure Depth (cm) 0.1 cm 08/20/21 1500   Post-Procedure Surface Area (cm^2) 1.95 cm^2 08/20/21 1500   Post-Procedure Volume (cm^3) 0.195 cm^3 08/20/21 1500   Wound Bed Granulation (%) 20 % 08/20/21 1500   Wound Bed Slough (%) 80 % 08/20/21 1500   Tunneling (cm) 0 cm 08/20/21 1500   Undermining (cm) 0 cm 08/20/21 1500   Wound Odor None 08/20/21 1500   Exposed Structures None 08/20/21 1500           PROCEDURE:   -2% viscous lidocaine applied topically to wound beds for approximately 5 minutes prior to debridement  -Curette used to debride wound beds.  Excisional debridement was performed to remove devitalized tissue until healthy, bleeding tissue was visualized.   Entire surface of wounds, 10.4 cm2 debrided.  Tissue debrided into the subcutaneous layer.    -Bleeding controlled with manual pressure.    -Wound care completed by wound RN, refer to flowsheet  -Patient tolerated the procedure well, without c/o pain or discomfort.     EPIFIX APPLICATION:   6/4/2021: First application of epi fix mesh 4cm x 4.5cm. [id number], EXP 03/01/2026  REORDER # [id number]    6/11/2021:  Second application of epi fix mesh 4cm x 4.5cm. [id number], EXP 03/01/2026  REORDER # [id number]      Pertinent Labs and Diagnostics:    Labs:     IMAGING: No results found.    VASCULAR STUDIES: No results found.     LAST  WOUND CULTURE:   Lab Results   Component Value Date/Time    CULTRSULT Light growth usual skin lotus. (A) 05/18/2021 04:01 PM    CULTRSULT Proteus mirabilis  Moderate growth   (A) 05/18/2021 04:01 PM       ASSESSMENT AND PLAN:     1. Wound of left lower extremity, subsequent encounter    8/20/2021:-Wound area has increased since last assessment.  New wound noted distal from the original.  -Excisional debridement of wound in clinic today, medically necessary to promote wound healing.  -Patient to return to clinic weekly for assessment and debridement  -Home health to change dressing 1-2 times per week in between clinic visits      Wound care: Hydrofera Blue to manage exudate and bioburden,  Hypafix tape    2. Pain:  8/20/2021: Patient able to tolerate debridement today with topical lidocaine  -2% viscous lidocaine applied topically to wound bed for approximately 5 minutes prior to debridement  -Patient tolerated procedure today with no complaints of discomfort.    3. Cognitive impairment  Comment: Patient has a history of intermittent agitation secondary to underlying psychiatric disorder.  Patient taking Risperdal, Depakote, gabapentin per psychiatric recommendations.  Patient is stable but has been deemed incapacitated to make medical decisions and has radiation granted as of 1/29/2021.  Patient is stable to discharge and was discharged to a group home with home health.    8/20/2021: Patient a bit agitated at beginning of today's visit, though easily redirected.    PATIENT EDUCATION  - Importance of managing edema for healing of ulcer, and for prevention of new ulcer development  -Need for lifelong compression of lower legs   -Elevate legs above the level of the heart periodically throughout  the day.  - Importance of adequate nutrition for wound healing  -Advised to go to ER for any increased redness, swelling, drainage or odor, or if patient develops fever, chills, nausea or vomiting.    20 min spent face to face with patient, >50% of time spent counseling, coordinating care, reviewing records, discussing POC, educating patient regarding vascular disease, wound healing and progression. This time was spent in excess to procedure time.       Please note that this note may have been created using voice recognition software. I have worked with technical experts from [email or web address] to optimize the interface.  I have made every reasonable attempt to correct obvious errors, but there may be errors of grammar and possibly

## 2021-08-27 ENCOUNTER — OFFICE VISIT (OUTPATIENT)
Dept: WOUND CARE | Facility: MEDICAL CENTER | Age: 66
End: 2021-08-27
Attending: NURSE PRACTITIONER
Payer: MEDICARE

## 2021-08-27 DIAGNOSIS — S81.802D WOUND OF LEFT LOWER EXTREMITY, SUBSEQUENT ENCOUNTER: ICD-10-CM

## 2021-12-23 NOTE — CARE PLAN
Problem: Safety  Goal: Will remain free from injury  Outcome: PROGRESSING AS EXPECTED  Note: Patient with tele sitter in place for safety as patient is impulsive at times. Patient is steady on feet and needs assistance at times and does not always call for assistance appropriately.   Goal: Will remain free from falls  Description: Pt mobilizes frequently independently.   Outcome: PROGRESSING AS EXPECTED     Problem: Infection  Goal: Will remain free from infection  Outcome: PROGRESSING AS EXPECTED     Problem: Venous Thromboembolism (VTW)/Deep Vein Thrombosis (DVT) Prevention:  Goal: Patient will participate in Venous Thrombosis (VTE)/Deep Vein Thrombosis (DVT)Prevention Measures  Outcome: PROGRESSING AS EXPECTED     Problem: Bowel/Gastric:  Goal: Normal bowel function is maintained or improved  Outcome: PROGRESSING AS EXPECTED  Goal: Will not experience complications related to bowel motility  Outcome: PROGRESSING AS EXPECTED  Flowsheets  Taken 4/27/2021 0817 by Tammie Spencer, R.N.  Last BM: (RONNI pt unsure) --  Taken 4/15/2021 2100 by Nanda Mann, R.N.  Number of Times Stooled: 0  Note: Patient unable to recall last BM however patient with no signs of constipation at this time and has +BS.      Yes

## 2022-02-07 ENCOUNTER — HOSPITAL ENCOUNTER (OUTPATIENT)
Facility: MEDICAL CENTER | Age: 67
End: 2022-02-07
Payer: MEDICARE

## 2022-02-07 PROCEDURE — 87070 CULTURE OTHR SPECIMN AEROBIC: CPT

## 2022-02-07 PROCEDURE — 87205 SMEAR GRAM STAIN: CPT

## 2022-02-07 PROCEDURE — 87186 SC STD MICRODIL/AGAR DIL: CPT

## 2022-02-07 PROCEDURE — 87077 CULTURE AEROBIC IDENTIFY: CPT

## 2022-02-08 LAB
GRAM STN SPEC: NORMAL
SIGNIFICANT IND 70042: NORMAL
SITE SITE: NORMAL
SOURCE SOURCE: NORMAL

## 2022-02-23 PROBLEM — Z78.9 ALCOHOL USE: Status: RESOLVED | Noted: 2019-05-27 | Resolved: 2022-02-23

## 2022-03-11 NOTE — PROGRESS NOTES
Report received. Assumed care. Pt in bed. Sitter bedside A/O x4. VSS. Responds appropriately. Denies chest pain and SOB. Assessment complete. Discussed POC, , pt verbalizes understanding. Explained importance of calling before getting OOB. Call light and belongings within reach. Bed alarm on refused. Bed in the lowest position. Treaded socks in place. Hourly rounding in progress.    Physical Exam:  Gen: NAD, non-toxic appearing, awake alert   Head: NCAT  HEENT: EOMI, normal conjunctiva, oral mucosa moist  Lung: CTAB, no respiratory distress, no wheezes/rhonchi/rales B/L, speaking in full sentences  CV: RRR  Abd: soft, NT, ND, no guarding, no rigidity, no rebound tenderness  MSK: no visible deformities, ROM normal in UE/LE, no spinal tenderness   Neuro: CN's 2-12 grossly intact, No temporal TTP, No focal sensory or motor deficits  Skin: Warm, well perfused, no rash, no leg swelling  ~Wilmar Galloway MD (PGY-2)

## 2022-03-28 NOTE — PROGRESS NOTES
Mobility Progress Note    Surgery patient: no  Date of surgery: n/a  Ambulated 50 ft on day of surgery? (N/A if patient did not undergo surgery today): n/a  Number of times ambulated 50 feet or greater today: multiple times  Patient has been up to chair, edge of bed or HOB 90 degrees for all meals?: yes  Goal met? (goal is ambulating at least 50 feet 2 times on day shift, one time on night shift): yes  If patient did not meet mobility goal, why?: n/a   FAMILY HISTORY:  No pertinent family history in first degree relatives

## 2022-04-07 PROBLEM — D50.9 MICROCYTIC ANEMIA: Status: RESOLVED | Noted: 2018-08-22 | Resolved: 2022-04-07

## 2022-04-07 PROBLEM — R05.8 DRY COUGH: Status: RESOLVED | Noted: 2020-04-01 | Resolved: 2022-04-07

## 2022-04-07 PROBLEM — L03.116 CELLULITIS OF LEFT LOWER EXTREMITY: Status: RESOLVED | Noted: 2018-08-22 | Resolved: 2022-04-07

## 2022-04-07 PROBLEM — R41.0 DELIRIUM: Status: RESOLVED | Noted: 2018-11-09 | Resolved: 2022-04-07

## 2022-04-07 PROBLEM — G93.40 ENCEPHALOPATHY: Status: RESOLVED | Noted: 2020-08-09 | Resolved: 2022-04-07

## 2022-04-07 PROBLEM — L03.90 CELLULITIS: Status: RESOLVED | Noted: 2019-05-27 | Resolved: 2022-04-07

## 2022-04-07 PROBLEM — F10.921 ALCOHOL INTOXICATION WITH DELIRIUM (HCC): Status: RESOLVED | Noted: 2018-10-24 | Resolved: 2022-04-07

## 2022-04-14 ENCOUNTER — TELEPHONE (OUTPATIENT)
Dept: MEDICAL GROUP | Facility: PHYSICIAN GROUP | Age: 67
End: 2022-04-14
Payer: MEDICARE

## 2022-04-14 NOTE — TELEPHONE ENCOUNTER
outreach to Chasity, with [facility] [phone].  States he was not aware of [facility] referral from 2/23/22.  Instructed to call clinic at [phone] to coordinate care and schedule appt for LE non healing wound.  He was in agreement.  Confirmed with Estephania with [facility]  [phone] pt is still on [facility]vs for wound care. Will update primary care team.

## 2022-04-19 ENCOUNTER — OFFICE VISIT (OUTPATIENT)
Dept: WOUND CARE | Facility: MEDICAL CENTER | Age: 67
End: 2022-04-19
Payer: MEDICARE

## 2022-04-19 VITALS
SYSTOLIC BLOOD PRESSURE: 139 MMHG | HEART RATE: 78 BPM | RESPIRATION RATE: 16 BRPM | DIASTOLIC BLOOD PRESSURE: 92 MMHG | OXYGEN SATURATION: 94 % | TEMPERATURE: 97.6 F

## 2022-04-19 DIAGNOSIS — R41.89 COGNITIVE IMPAIRMENT: ICD-10-CM

## 2022-04-19 DIAGNOSIS — R52 PAIN: ICD-10-CM

## 2022-04-19 DIAGNOSIS — S81.802D WOUND OF LEFT LOWER EXTREMITY, SUBSEQUENT ENCOUNTER: Primary | ICD-10-CM

## 2022-04-19 PROCEDURE — 11042 DBRDMT SUBQ TIS 1ST 20SQCM/<: CPT

## 2022-04-19 PROCEDURE — 99214 OFFICE O/P EST MOD 30 MIN: CPT | Mod: 25

## 2022-04-19 PROCEDURE — 99214 OFFICE O/P EST MOD 30 MIN: CPT | Mod: 25 | Performed by: STUDENT IN AN ORGANIZED HEALTH CARE EDUCATION/TRAINING PROGRAM

## 2022-04-19 PROCEDURE — 11042 DBRDMT SUBQ TIS 1ST 20SQCM/<: CPT | Performed by: STUDENT IN AN ORGANIZED HEALTH CARE EDUCATION/TRAINING PROGRAM

## 2022-04-19 ASSESSMENT — ENCOUNTER SYMPTOMS
ROS SKIN COMMENTS: CHRONIC WOUNDS
FEVER: 0
CHILLS: 0

## 2022-04-19 NOTE — PROGRESS NOTES
Provider Encounter- Full Thickness wound    HISTORY OF PRESENT ILLNESS  Wound History:    START OF CARE IN CLINIC: 4/19/2022 (return to clinic)    REFERRING PROVIDER: Juan C Worthington      WOUND- Full Thickness Wound   LOCATION: Left medial lower extremity / posterior knee   HISTORY:  Patient is a 65-year-old male with a past medical history of alcohol dependence, tobacco dependence, psychiatric disorder, and HTN who presented to the emergency department on 8/7/2020 with a left lower extremity wound infection. He was evaluated by orthopedic surgery who recommended ongoing wound care. Wound cultures at that time grew MSSA and Streptococcus. He had been seen at [facility] earlier and was prescribed antibiotics, but never filled the prescription.  An ultrasound of that extremity was done and was negative for DVT.  He was treated for sepsis secondary to cellulitis and completed an antibiotic course on 9/16/2020.  He underwent multiple bedside debridements and aggressive wound care.  Over admission he was also found to have head lice and underwent treatment for that. A repeat wound culture was done on 9/28/2020 and grew Strep A and Proteus. Infectious disease was consulted and recommended a 5-day course of IV zosyn which was completed on 10/5/2020. On 10/12/2020 the wound care team noticed increased inflammation to the proximal part of the wound bed and switched from a vera flow wound VAC to a regular wound VAC.  ID was again consulted and on 10/14/2020 recommended a 7-day course of antibiotics with meropenem which was completed on 10/22/2020.  On 11/19/2020 he underwent left lower extremity debridement with wound VAC placement.  He continues to undergo wound care and has wound VAC in place.  His wound appears to be healing appropriately.  He will require ongoing wound care outside the hospital. Additionally, he was noted to have intermittent agitation secondary to underlying psychiatric disorder and was  started on risperdal, depakote, and gabapentin per psychiatry recommendations.  He has remained stable but has been deemed incapacitated to make medical decisions so guardianship was pursued and granted on 1/29/21.  He was deemed  appropriate for group home placement with home health ordered for wound care and wound VAC management.  He was discharged to group home on 5/3/2021 home health coming by and doing wound VAC changes 2 times per week.    Patient was following with [facility], receiving active treatment for left lower extremity wounds. He was last seen in clinic on 8/20/2021 and was lost to follow up. He is currently living in group home and home health has been managing wounds.    Pertinent Medical History: Alcohol dependence (in remission), tobacco dependence, psychiatric disorder, cognitive disorder.    TOBACCO USE:  Former smoker    Patient's problem list, allergies, and current medications reviewed and updated in Epic    Interval History:  4/19/2022: Initial Clinic visit with Baldomero Kasper MD. Patient returns to clinic following prolonged absence. Patient unable to provide meaningful history. He denies any complaints of pain, fevers, or chills. Patient is currently resident at group home and wounds have been managed by home health. Wounds appear chronic and largely unchanged from previous assessment. Wounds with some slough and no evidence of infection on this visit.      REVIEW OF SYSTEMS:   Review of Systems   Unable to perform ROS: Dementia   Constitutional: Negative for chills, fever and malaise/fatigue.   Skin:        Chronic wounds       PHYSICAL EXAMINATION:   /92 (BP Location: Left arm, Patient Position: Sitting) Comment: RN notified  Pulse 78   Temp 36.4 °C (97.6 °F)   Resp 16   SpO2 94%     Physical Exam  Constitutional:       General: He is not in acute distress.  Cardiovascular:      Rate and Rhythm: Normal rate.      Comments: Palpable DP and PT pulses  Pulmonary:      Effort: Pulmonary  effort is normal. No respiratory distress.   Musculoskeletal:      Right lower leg: No edema.      Left lower leg: Edema present.   Skin:     General: Skin is warm.      Comments: Chronic full thickness wounds left posterior knee    See flowsheet for details   Neurological:      General: No focal deficit present.      Mental Status: He is alert. Mental status is at baseline.      Comments: Orientated to person but not to place or year  Appears to be baseline         WOUND ASSESSMENT  Wound 04/19/22 Full Thickness Wound Left Posterior LE Proximal (Active)   Wound Image    04/19/22 1500   Site Assessment Red;Yellow 04/19/22 1500   Periwound Assessment Scar tissue 04/19/22 1500   Margins Attached edges 04/19/22 1500   Drainage Amount Moderate 04/19/22 1500   Drainage Description Serosanguineous 04/19/22 1500   Treatments Cleansed;Topical Lidocaine;Provider debridement 04/19/22 1500   Wound Cleansing Normal Saline Irrigation 04/19/22 1500   Periwound Protectant Skin Protectant Wipes to Periwound;Barrier Paste 04/19/22 1500   Dressing Cleansing/Solutions Not Applicable 04/19/22 1500   Dressing Options Hydrofera Blue Ready;Hypafix Tape;Tubigrip 04/19/22 1500   Dressing Changed New 04/19/22 1500   Dressing Change/Treatment Frequency Every 72 hrs, and As Needed 04/19/22 1500   Non-staged Wound Description Full thickness 04/19/22 1500   Wound Length (cm) 2.1 cm 04/19/22 1500   Wound Width (cm) 0.8 cm 04/19/22 1500   Wound Depth (cm) 0.1 cm 04/19/22 1500   Wound Surface Area (cm^2) 1.68 cm^2 04/19/22 1500   Wound Volume (cm^3) 0.168 cm^3 04/19/22 1500   Post-Procedure Length (cm) 2.3 cm 04/19/22 1500   Post-Procedure Width (cm) 0.8 cm 04/19/22 1500   Post-Procedure Depth (cm) 0.1 cm 04/19/22 1500   Post-Procedure Surface Area (cm^2) 1.84 cm^2 04/19/22 1500   Post-Procedure Volume (cm^3) 0.184 cm^3 04/19/22 1500   Tunneling (cm) 0 cm 04/19/22 1500   Undermining (cm) 0 cm 04/19/22 1500   Wound Odor None 04/19/22 1500   Exposed  Structures None 04/19/22 1500   Number of days: 0       Wound 04/19/22 Full Thickness Wound Left Posterior LE Middle (Active)   Wound Image    04/19/22 1500   Site Assessment Red;Yellow 04/19/22 1500   Periwound Assessment Scar tissue 04/19/22 1500   Margins Attached edges 04/19/22 1500   Drainage Amount Moderate 04/19/22 1500   Drainage Description Serosanguineous 04/19/22 1500   Treatments Cleansed;Topical Lidocaine;Provider debridement 04/19/22 1500   Wound Cleansing Normal Saline Irrigation 04/19/22 1500   Periwound Protectant Skin Protectant Wipes to Periwound;Barrier Paste 04/19/22 1500   Dressing Cleansing/Solutions Not Applicable 04/19/22 1500   Dressing Options Hydrofera Blue Ready;Hypafix Tape;Tubigrip 04/19/22 1500   Dressing Changed New 04/19/22 1500   Dressing Change/Treatment Frequency Every 72 hrs, and As Needed 04/19/22 1500   Non-staged Wound Description Full thickness 04/19/22 1500   Wound Length (cm) 1.2 cm 04/19/22 1500   Wound Width (cm) 0.5 cm 04/19/22 1500   Wound Depth (cm) 0.1 cm 04/19/22 1500   Wound Surface Area (cm^2) 0.6 cm^2 04/19/22 1500   Wound Volume (cm^3) 0.06 cm^3 04/19/22 1500   Post-Procedure Length (cm) 1.4 cm 04/19/22 1500   Post-Procedure Width (cm) 0.5 cm 04/19/22 1500   Post-Procedure Depth (cm) 0.1 cm 04/19/22 1500   Post-Procedure Surface Area (cm^2) 0.7 cm^2 04/19/22 1500   Post-Procedure Volume (cm^3) 0.07 cm^3 04/19/22 1500   Tunneling (cm) 0 cm 04/19/22 1500   Undermining (cm) 0 cm 04/19/22 1500   Wound Odor None 04/19/22 1500   Exposed Structures None 04/19/22 1500   Number of days: 0       Wound 04/19/22 Full Thickness Wound Left Posterior LE Distal (Active)   Wound Image   04/19/22 1500   Site Assessment Red;Yellow 04/19/22 1500   Periwound Assessment Scar tissue 04/19/22 1500   Margins Attached edges 04/19/22 1500   Drainage Amount Moderate 04/19/22 1500   Drainage Description Serosanguineous 04/19/22 1500   Treatments Cleansed;Topical Lidocaine;Provider  debridement 04/19/22 1500   Wound Cleansing Normal Saline Irrigation 04/19/22 1500   Periwound Protectant Skin Protectant Wipes to Periwound;Barrier Paste 04/19/22 1500   Dressing Cleansing/Solutions Not Applicable 04/19/22 1500   Dressing Options Hydrofera Blue Ready;Hypafix Tape;Tubigrip 04/19/22 1500   Dressing Changed New 04/19/22 1500   Dressing Change/Treatment Frequency Every 72 hrs, and As Needed 04/19/22 1500   Non-staged Wound Description Full thickness 04/19/22 1500   Post-Procedure Length (cm) 0.5 cm 04/19/22 1500   Post-Procedure Width (cm) 1.1 cm 04/19/22 1500   Post-Procedure Depth (cm) 0.1 cm 04/19/22 1500   Post-Procedure Surface Area (cm^2) 0.55 cm^2 04/19/22 1500   Post-Procedure Volume (cm^3) 0.055 cm^3 04/19/22 1500   Tunneling (cm) 0 cm 04/19/22 1500   Undermining (cm) 0 cm 04/19/22 1500   Wound Odor None 04/19/22 1500   Exposed Structures None 04/19/22 1500   Number of days: 0       PROCEDURE:   -2% viscous lidocaine applied topically to wound bed for approximately 5 minutes prior to debridement  -Curette used to debride wound bed.  Excisional debridement was performed to remove devitalized tissue until healthy, bleeding tissue was visualized.   Entire surface of wound, 3.09 cm2 debrided.  Tissue debrided into the subcutaneous layer.    -Bleeding controlled with manual pressure.    -Wound care completed by wound RN, refer to flowsheet  -Patient tolerated the procedure well, without c/o pain or discomfort.       Pertinent Labs and Diagnostics:    Labs:     A1c:   Lab Results   Component Value Date/Time    HBA1C 5.4 01/23/2020 04:37 PM    HBA1C 5.7 (H) 11/09/2018 06:30 PM          IMAGING: None    VASCULAR STUDIES: 9/1/2020   Vascular Laboratory   Conclusions   1.  Normal bilateral KOSTAS's.    LAST  WOUND CULTURE:  DATE :   Lab Results   Component Value Date/Time    CULTRSULT - (A) 02/07/2022 02:51 PM    CULTRSULT (A) 02/07/2022 02:51 PM     Methicillin Resistant Staphylococcus aureus  Moderate  growth           ASSESSMENT AND PLAN:     1. Wound of left lower extremity, subsequent encounter  Comments: Chronic wounds previously treated at [facility], last seen 2021. Has been receiving treatment with home health. Establishing in clinic 4/19/2022 4/19/2022:  - Patient unable to provide meaningful history. Has been receiving wound care by home health. Wounds are chronic and do not appear to have healed since previously following in clinic.  - Palpable pulses  - No evidence of infection  - Likely difficult to heal due to location  - Excisional debridement was performed in clinic, medically necessary to promote wound healing.  - Return to clinic weekly for assessment and debridement. Home health to change dressings 1-2 times per week between clinic appointments.   Wound Care: Hydrofera blue, tubigrip    2. Pain    4/19/2022  - 2% topical lidocaine applied to wounds prior to debridement  - Denies any pain in clinic today    3. Cognitive impairment  Comments: History of phychiatric disorder, appears to have cognitive decline likely due to demential    4/19/2022  - Unable to provide significant history  - Appears to be at baseline    PATIENT EDUCATION  - Importance of adequate nutrition for wound healing  -Advised to go to ER for any increased redness, swelling, drainage, or odor, or if patient develops fever, chills, nausea or vomiting.     My total time spent caring for the patient on the day of the encounter was 30 minutes.   This does not include time spent on separately billable procedures/tests.       Please note that this note may have been created using voice recognition software. I have worked with technical experts from [employer] [facility] to optimize the interface.  I have made every reasonable attempt to correct obvious errors, but there may be errors of grammar and possibly content that I did not discover before finalizing the note.    N

## 2022-04-19 NOTE — PATIENT INSTRUCTIONS
-Keep your wound dressing clean, dry, and intact.    -Change your dressing if it becomes soiled, soaked, or falls off.    -Should you experience any significant changes in your wound(s), such as infection (redness, swelling, localized heat, increased pain, fever > 101 F, chills) or have any questions regarding your home care instructions, please contact the wound center at [phone]. If after hours, contact your primary care physician or go to the hospital emergency room.

## 2022-04-26 ENCOUNTER — OFFICE VISIT (OUTPATIENT)
Dept: WOUND CARE | Facility: MEDICAL CENTER | Age: 67
End: 2022-04-26
Payer: MEDICARE

## 2022-04-26 VITALS
SYSTOLIC BLOOD PRESSURE: 130 MMHG | RESPIRATION RATE: 19 BRPM | OXYGEN SATURATION: 95 % | DIASTOLIC BLOOD PRESSURE: 83 MMHG | TEMPERATURE: 98.5 F | HEART RATE: 83 BPM

## 2022-04-26 DIAGNOSIS — R41.89 COGNITIVE IMPAIRMENT: ICD-10-CM

## 2022-04-26 DIAGNOSIS — S81.802D WOUND OF LEFT LOWER EXTREMITY, SUBSEQUENT ENCOUNTER: ICD-10-CM

## 2022-04-26 DIAGNOSIS — R52 PAIN: ICD-10-CM

## 2022-04-26 PROCEDURE — 11042 DBRDMT SUBQ TIS 1ST 20SQCM/<: CPT | Performed by: STUDENT IN AN ORGANIZED HEALTH CARE EDUCATION/TRAINING PROGRAM

## 2022-04-26 PROCEDURE — 99213 OFFICE O/P EST LOW 20 MIN: CPT | Mod: 25

## 2022-04-26 PROCEDURE — 11042 DBRDMT SUBQ TIS 1ST 20SQCM/<: CPT

## 2022-04-26 PROCEDURE — 99213 OFFICE O/P EST LOW 20 MIN: CPT | Mod: 25 | Performed by: STUDENT IN AN ORGANIZED HEALTH CARE EDUCATION/TRAINING PROGRAM

## 2022-04-26 ASSESSMENT — ENCOUNTER SYMPTOMS
FEVER: 0
ROS SKIN COMMENTS: CHRONIC WOUNDS
CHILLS: 0

## 2022-04-26 NOTE — PATIENT INSTRUCTIONS
-Keep your wound dressing clean, dry, and intact.    -Should you experience any significant changes in your wound(s), such as infection (redness, swelling, localized heat, increased pain, fever > 101 F, chills) or have any questions regarding your home care instructions, please contact the wound center at [phone]. If after hours, contact your primary care physician or go to the hospital emergency room.    No

## 2022-04-26 NOTE — PROGRESS NOTES
Provider Encounter- Full Thickness wound    HISTORY OF PRESENT ILLNESS  Wound History:    START OF CARE IN CLINIC: 4/19/2022 (return to clinic)    REFERRING PROVIDER: Juan C Worthington      WOUND- Full Thickness Wound   LOCATION: Left medial lower extremity / posterior knee   HISTORY:  Patient is a 65-year-old male with a past medical history of alcohol dependence, tobacco dependence, psychiatric disorder, and HTN who presented to the emergency department on 8/7/2020 with a left lower extremity wound infection. He was evaluated by orthopedic surgery who recommended ongoing wound care. Wound cultures at that time grew MSSA and Streptococcus. He had been seen at [facility] earlier and was prescribed antibiotics, but never filled the prescription.  An ultrasound of that extremity was done and was negative for DVT.  He was treated for sepsis secondary to cellulitis and completed an antibiotic course on 9/16/2020.  He underwent multiple bedside debridements and aggressive wound care.  Over admission he was also found to have head lice and underwent treatment for that. A repeat wound culture was done on 9/28/2020 and grew Strep A and Proteus. Infectious disease was consulted and recommended a 5-day course of IV zosyn which was completed on 10/5/2020. On 10/12/2020 the wound care team noticed increased inflammation to the proximal part of the wound bed and switched from a vera flow wound VAC to a regular wound VAC.  ID was again consulted and on 10/14/2020 recommended a 7-day course of antibiotics with meropenem which was completed on 10/22/2020.  On 11/19/2020 he underwent left lower extremity debridement with wound VAC placement.  He continues to undergo wound care and has wound VAC in place.  His wound appears to be healing appropriately.  He will require ongoing wound care outside the hospital. Additionally, he was noted to have intermittent agitation secondary to underlying psychiatric disorder and was  started on risperdal, depakote, and gabapentin per psychiatry recommendations.  He has remained stable but has been deemed incapacitated to make medical decisions so guardianship was pursued and granted on 1/29/21.  He was deemed  appropriate for group home placement with home health ordered for wound care and wound VAC management.  He was discharged to group home on 5/3/2021 home health coming by and doing wound VAC changes 2 times per week.    Patient was following with [facility], receiving active treatment for left lower extremity wounds. He was last seen in clinic on 8/20/2021 and was lost to follow up. He is currently living in group home and home health has been managing wounds.    Pertinent Medical History: Alcohol dependence (in remission), tobacco dependence, psychiatric disorder, cognitive disorder.    TOBACCO USE:  Former smoker    Patient's problem list, allergies, and current medications reviewed and updated in Epic    Interval History:  4/19/2022: Initial Clinic visit with Baldomero Kasper MD. Patient returns to clinic following prolonged absence. Patient unable to provide meaningful history. He denies any complaints of pain, fevers, or chills. Patient is currently resident at group home and wounds have been managed by home health. Wounds appear chronic and largely unchanged from previous assessment. Wounds with some slough and no evidence of infection on this visit.    4/26/2022: Clinic visit with Baldomero Kasper MD. Patient reports feeling fine. He denies any symptoms of infection. Patient does not remember if home health is changing dressings. Nursing to make sure he has home health with [facility]. Wounds slightly smaller, wound healing complicated by location with frequent movement and periwound scar tissue.      REVIEW OF SYSTEMS:   Review of Systems   Unable to perform ROS: Dementia   Constitutional: Negative for chills, fever and malaise/fatigue.   Skin:        Chronic wounds       PHYSICAL EXAMINATION:    /83 (BP Location: Left arm, Patient Position: Sitting)   Pulse 83   Temp 36.9 °C (98.5 °F) (Temporal)   Resp 19   SpO2 95%     Physical Exam  Constitutional:       General: He is not in acute distress.  Cardiovascular:      Rate and Rhythm: Normal rate.      Comments: Palpable DP and PT pulses  Pulmonary:      Effort: Pulmonary effort is normal. No respiratory distress.   Musculoskeletal:      Right lower leg: No edema.      Left lower leg: Edema present.   Skin:     General: Skin is warm.      Comments: Chronic full thickness wounds left posterior knee    See flowsheet for details   Neurological:      General: No focal deficit present.      Mental Status: He is alert. Mental status is at baseline.      Comments: Orientated to person but not to place or year  Appears to be baseline         WOUND ASSESSMENT  Wound 04/19/22 Full Thickness Wound Left Posterior LE Proximal (Active)   Wound Image    04/26/22 1400   Site Assessment Red 04/26/22 1400   Periwound Assessment Scar tissue 04/26/22 1400   Margins Attached edges 04/26/22 1400   Closure Secondary intention 04/26/22 1400   Drainage Amount Moderate 04/26/22 1400   Drainage Description Serosanguineous 04/26/22 1400   Treatments Cleansed;Topical Lidocaine;Provider debridement;Site care 04/26/22 1400   Wound Cleansing Puracyn [place] 04/26/22 1400   Periwound Protectant Skin Protectant Wipes to Periwound;Barrier Paste 04/26/22 1400   Dressing Cleansing/Solutions Not Applicable 04/26/22 1400   Dressing Options Hydrofera Blue Ready;Hypafix Tape;Tubigrip 04/26/22 1400   Dressing Changed Changed 04/26/22 1400   Dressing Status Clean;Dry;Intact 04/26/22 1400   Dressing Change/Treatment Frequency Every 72 hrs, and As Needed 04/26/22 1400   Non-staged Wound Description Full thickness 04/26/22 1400   Wound Length (cm) 1.5 cm 04/26/22 1400   Wound Width (cm) 0.7 cm 04/26/22 1400   Wound Depth (cm) 0.1 cm 04/26/22 1400   Wound Surface Area (cm^2) 1.05 cm^2 04/26/22  1400   Wound Volume (cm^3) 0.105 cm^3 04/26/22 1400   Post-Procedure Length (cm) 1.7 cm 04/26/22 1400   Post-Procedure Width (cm) 0.7 cm 04/26/22 1400   Post-Procedure Depth (cm) 0.1 cm 04/26/22 1400   Post-Procedure Surface Area (cm^2) 1.19 cm^2 04/26/22 1400   Post-Procedure Volume (cm^3) 0.119 cm^3 04/26/22 1400   Wound Healing % 38 04/26/22 1400   Tunneling (cm) 0 cm 04/26/22 1400   Undermining (cm) 0 cm 04/26/22 1400   Wound Odor None 04/26/22 1400   Exposed Structures None 04/26/22 1400   Number of days: 7       Wound 04/19/22 Full Thickness Wound Left Posterior LE Middle (Active)   Wound Image    04/26/22 1400   Site Assessment Red 04/26/22 1400   Periwound Assessment Scar tissue 04/26/22 1400   Margins Attached edges 04/26/22 1400   Closure Secondary intention 04/26/22 1400   Drainage Amount Moderate 04/26/22 1400   Drainage Description Serosanguineous 04/26/22 1400   Treatments Cleansed;Topical Lidocaine;Provider debridement;Site care 04/26/22 1400   Wound Cleansing Puracyn [place] 04/26/22 1400   Periwound Protectant Skin Protectant Wipes to Periwound;Barrier Paste 04/26/22 1400   Dressing Cleansing/Solutions Not Applicable 04/26/22 1400   Dressing Options Hydrofera Blue Ready;Hypafix Tape;Tubigrip 04/26/22 1400   Dressing Changed Changed 04/26/22 1400   Dressing Status Clean;Dry;Intact 04/26/22 1400   Dressing Change/Treatment Frequency Every 72 hrs, and As Needed 04/26/22 1400   Non-staged Wound Description Full thickness 04/26/22 1400   Wound Length (cm) 0.5 cm 04/26/22 1400   Wound Width (cm) 0.9 cm 04/26/22 1400   Wound Depth (cm) 0.1 cm 04/26/22 1400   Wound Surface Area (cm^2) 0.45 cm^2 04/26/22 1400   Wound Volume (cm^3) 0.045 cm^3 04/26/22 1400   Post-Procedure Length (cm) 0.5 cm 04/26/22 1400   Post-Procedure Width (cm) 0.9 cm 04/26/22 1400   Post-Procedure Depth (cm) 0.1 cm 04/26/22 1400   Post-Procedure Surface Area (cm^2) 0.45 cm^2 04/26/22 1400   Post-Procedure Volume (cm^3) 0.045 cm^3 04/26/22  1400   Wound Healing % 25 04/26/22 1400   Tunneling (cm) 0 cm 04/26/22 1400   Undermining (cm) 0 cm 04/26/22 1400   Wound Odor None 04/26/22 1400   Exposed Structures None 04/26/22 1400   Number of days: 7       Wound 04/19/22 Full Thickness Wound Left Posterior LE Distal (Active)   Wound Image    04/26/22 1400   Site Assessment Red;[place] 04/26/22 1400   Periwound Assessment Scar tissue 04/26/22 1400   Margins Attached edges 04/26/22 1400   Closure Secondary intention 04/26/22 1400   Drainage Amount Moderate 04/26/22 1400   Drainage Description Serosanguineous 04/26/22 1400   Treatments Cleansed;Topical Lidocaine;Provider debridement;Site care 04/26/22 1400   Wound Cleansing Puracyn [place] 04/26/22 1400   Periwound Protectant Skin Protectant Wipes to Periwound;Barrier Paste;Skin Moisturizer 04/26/22 1400   Dressing Cleansing/Solutions Not Applicable 04/26/22 1400   Dressing Options Hydrofera Blue Ready;Hypafix Tape;Tubigrip 04/26/22 1400   Dressing Changed Changed 04/26/22 1400   Dressing Status Clean;Dry;Intact 04/26/22 1400   Dressing Change/Treatment Frequency Every 72 hrs, and As Needed 04/26/22 1400   Non-staged Wound Description Full thickness 04/26/22 1400   Wound Length (cm) 0.5 cm 04/26/22 1400   Wound Width (cm) 0.7 cm 04/26/22 1400   Wound Depth (cm) 0.1 cm 04/26/22 1400   Wound Surface Area (cm^2) 0.35 cm^2 04/26/22 1400   Wound Volume (cm^3) 0.035 cm^3 04/26/22 1400   Post-Procedure Length (cm) 0.5 cm 04/26/22 1400   Post-Procedure Width (cm) 0.7 cm 04/26/22 1400   Post-Procedure Depth (cm) 0.1 cm 04/26/22 1400   Post-Procedure Surface Area (cm^2) 0.35 cm^2 04/26/22 1400   Post-Procedure Volume (cm^3) 0.035 cm^3 04/26/22 1400   Tunneling (cm) 0 cm 04/26/22 1400   Undermining (cm) 0 cm 04/26/22 1400   Wound Odor None 04/26/22 1400   Exposed Structures None 04/26/22 1400   Number of days: 7       PROCEDURE:   -2% viscous lidocaine applied topically to wound bed for approximately 5 minutes prior to  debridement  -Curette used to debride wound bed.  Excisional debridement was performed to remove devitalized tissue until healthy, bleeding tissue was visualized.   Entire surface of wound, 1.99 cm2 debrided.  Tissue debrided into the subcutaneous layer.    -Bleeding controlled with manual pressure.    -Wound care completed by wound RN, refer to flowsheet  -Patient tolerated the procedure well, without c/o pain or discomfort.       Pertinent Labs and Diagnostics:    Labs:     A1c:   Lab Results   Component Value Date/Time    HBA1C 5.4 01/23/2020 04:37 PM    HBA1C 5.7 (H) 11/09/2018 06:30 PM          IMAGING: None    VASCULAR STUDIES: 9/1/2020   Vascular Laboratory   Conclusions   1.  Normal bilateral KOSTAS's.    LAST  WOUND CULTURE:  DATE :   Lab Results   Component Value Date/Time    CULTRSULT - (A) 02/07/2022 02:51 PM    CULTRSULT (A) 02/07/2022 02:51 PM     Methicillin Resistant Staphylococcus aureus  Moderate growth           ASSESSMENT AND PLAN:     1. Wound of left lower extremity, subsequent encounter  Comments: Chronic wounds previously treated at [facility] [place], last seen 2021. Has been receiving treatment with home health. Establishing in clinic 4/19/2022 4/26/2022: Wounds measure slightly smaller today  - Wound healing impacted by location with frequent movement posterior knee and tissue largely scarred from chronicity of wound  - Palpable pulses  - No evidence of infection  - Excisional debridement was performed in clinic, medically necessary to promote wound healing.  - Return to clinic weekly for assessment and debridement. Home health to change dressings 1-2 times per week between clinic appointments.   Wound Care: Hydrofera blue, tubigrip    2. Pain    4/26/2022  - 2% topical lidocaine applied to wounds prior to debridement  - Denies any pain in clinic today    3. Cognitive impairment  Comments: History of phychiatric disorder, appears to have cognitive decline likely due to demential    4/26/2022  - Unable to  provide significant history  - Appears to be at baseline  - Does not remember if home health changing dressings, will have nurse call to verify home health situation    PATIENT EDUCATION  - Importance of adequate nutrition for wound healing  -Advised to go to ER for any increased redness, swelling, drainage, or odor, or if patient develops fever, chills, nausea or vomiting.     My total time spent caring for the patient on the day of the encounter was 20 minutes.   This does not include time spent on separately billable procedures/tests.       Please note that this note may have been created using voice recognition software. I have worked with technical experts from [employer] to optimize the interface.  I have made every reasonable attempt to correct obvious errors, but there may be errors of grammar and possibly content that I did not discover before finalizing the note.    N

## 2022-05-13 ENCOUNTER — OFFICE VISIT (OUTPATIENT)
Dept: WOUND CARE | Facility: MEDICAL CENTER | Age: 67
End: 2022-05-13
Payer: MEDICARE

## 2022-05-13 VITALS
SYSTOLIC BLOOD PRESSURE: 132 MMHG | RESPIRATION RATE: 18 BRPM | TEMPERATURE: 98.8 F | DIASTOLIC BLOOD PRESSURE: 82 MMHG | HEART RATE: 63 BPM | OXYGEN SATURATION: 96 %

## 2022-05-13 DIAGNOSIS — L60.2 OVERGROWN TOENAILS: ICD-10-CM

## 2022-05-13 DIAGNOSIS — R41.89 COGNITIVE IMPAIRMENT: ICD-10-CM

## 2022-05-13 DIAGNOSIS — S81.802D WOUND OF LEFT LOWER EXTREMITY, SUBSEQUENT ENCOUNTER: ICD-10-CM

## 2022-05-13 DIAGNOSIS — R52 PAIN: ICD-10-CM

## 2022-05-13 DIAGNOSIS — B35.1 ONYCHOMYCOSIS: ICD-10-CM

## 2022-05-13 PROCEDURE — 99213 OFFICE O/P EST LOW 20 MIN: CPT | Mod: 25

## 2022-05-13 PROCEDURE — 11721 DEBRIDE NAIL 6 OR MORE: CPT

## 2022-05-13 PROCEDURE — 11042 DBRDMT SUBQ TIS 1ST 20SQCM/<: CPT | Mod: 59 | Performed by: NURSE PRACTITIONER

## 2022-05-13 PROCEDURE — 99213 OFFICE O/P EST LOW 20 MIN: CPT | Mod: 25 | Performed by: NURSE PRACTITIONER

## 2022-05-13 PROCEDURE — 11719 TRIM NAIL(S) ANY NUMBER: CPT | Performed by: NURSE PRACTITIONER

## 2022-05-13 PROCEDURE — 11042 DBRDMT SUBQ TIS 1ST 20SQCM/<: CPT

## 2022-05-13 ASSESSMENT — ENCOUNTER SYMPTOMS
ROS SKIN COMMENTS: CHRONIC WOUNDS
CHILLS: 0
FEVER: 0

## 2022-05-13 NOTE — PROGRESS NOTES
Provider Encounter- Full Thickness wound    HISTORY OF PRESENT ILLNESS  Wound History:    START OF CARE IN CLINIC: 4/19/2022 (return to clinic)    REFERRING PROVIDER: Juan C Worthington      WOUND- Full Thickness Wound   LOCATION: Left medial lower extremity / posterior knee   HISTORY:  Patient is a 65-year-old male with a past medical history of alcohol dependence, tobacco dependence, psychiatric disorder, and HTN who presented to the emergency department on 8/7/2020 with a left lower extremity wound infection. He was evaluated by orthopedic surgery who recommended ongoing wound care. Wound cultures at that time grew MSSA and Streptococcus. He had been seen at [facility], [place] earlier and was prescribed antibiotics, but never filled the prescription.  An ultrasound of that extremity was done and was negative for DVT.  He was treated for sepsis secondary to cellulitis and completed an antibiotic course on 9/16/2020.  He underwent multiple bedside debridements and aggressive wound care.  Over admission he was also found to have head lice and underwent treatment for that. A repeat wound culture was done on 9/28/2020 and grew Strep A and Proteus. Infectious disease was consulted and recommended a 5-day course of IV zosyn which was completed on 10/5/2020. On 10/12/2020 the wound care team noticed increased inflammation to the proximal part of the wound bed and switched from a vera flow wound VAC to a regular wound VAC.  ID was again consulted and on 10/14/2020 recommended a 7-day course of antibiotics with meropenem which was completed on 10/22/2020.  On 11/19/2020 he underwent left lower extremity debridement with wound VAC placement.  He continues to undergo wound care and has wound VAC in place.  His wound appears to be healing appropriately.  He will require ongoing wound care outside the hospital. Additionally, he was noted to have intermittent agitation secondary to underlying psychiatric disorder and was  started on risperdal, depakote, and gabapentin per psychiatry recommendations.  He has remained stable but has been deemed incapacitated to make medical decisions so guardianship was pursued and granted on 1/29/21.  He was deemed  appropriate for group home placement with home health ordered for wound care and wound VAC management.  He was discharged to group home on 5/3/2021 home health coming by and doing wound VAC changes 2 times per week.    Patient was following with [facility], receiving active treatment for left lower extremity wounds. He was last seen in clinic on 8/20/2021 and was lost to follow up. He is currently living in group home and home health has been managing wounds.    Pertinent Medical History: Alcohol dependence (in remission), tobacco dependence, psychiatric disorder, cognitive disorder.    TOBACCO USE:  Former smoker    Patient's problem list, allergies, and current medications reviewed and updated in Epic    Interval History:  4/19/2022: Initial Clinic visit with Baldomero Kasper MD. Patient returns to clinic following prolonged absence. Patient unable to provide meaningful history. He denies any complaints of pain, fevers, or chills. Patient is currently resident at group home and wounds have been managed by home health. Wounds appear chronic and largely unchanged from previous assessment. Wounds with some slough and no evidence of infection on this visit.    4/26/2022: Clinic visit with Baldomero Kasper MD. Patient reports feeling fine. He denies any symptoms of infection. Patient does not remember if home health is changing dressings. Nursing to make sure he has home health with [place] [facility]. Wounds slightly smaller, wound healing complicated by location with frequent movement and periwound scar tissue.    5/13/2022 : Clinic visit with RAFITA Rueda, FNP-BC, CWOCN, CFCN.  Patient returns to clinic today after 3-week absence.  He was a no-show for his last appointment.  He did not know why he  missed his last appointment.  States he is feeling well overall.  He does express some concern about his toenails, which are severely overgrown and mycotic.  He agrees to allow for trimming and filing of these in clinic today.      REVIEW OF SYSTEMS:   Review of Systems   Unable to perform ROS: Dementia   Constitutional: Negative for chills, fever and malaise/fatigue.   Skin:        Chronic wounds       PHYSICAL EXAMINATION:   /82 (BP Location: Left arm, Patient Position: Sitting)   Pulse 63   Temp 37.1 °C (98.8 °F)   Resp 18   SpO2 96%     Physical Exam  Constitutional:       General: He is not in acute distress.  Cardiovascular:      Rate and Rhythm: Normal rate.      Comments: Palpable DP and PT pulses  Pulmonary:      Effort: Pulmonary effort is normal. No respiratory distress.   Musculoskeletal:      Right lower leg: No edema.      Left lower leg: Edema present.   Skin:     General: Skin is warm.      Comments: Chronic full thickness wounds left posterior knee    See flowsheet for details   Neurological:      General: No focal deficit present.      Mental Status: He is alert. Mental status is at baseline.      Comments: Orientated to person but not to place or year  Appears to be baseline         WOUND ASSESSMENT  Wound 04/19/22 Full Thickness Wound Left Posterolateral LE Proximal (Active)   Wound Image    05/13/22 1049   Site Assessment Dry;Brown 05/13/22 1049   Periwound Assessment Scar tissue 05/13/22 1049   Margins Attached edges 05/13/22 1049   Closure Secondary intention 04/26/22 1400   Drainage Amount RONNI 05/13/22 1049   Drainage Description Serosanguineous 04/26/22 1400   Treatments Cleansed;Topical Lidocaine;Provider debridement 05/13/22 1049   Wound Cleansing Normal Saline Irrigation 05/13/22 1049   Periwound Protectant Skin Protectant Wipes to Periwound;Barrier Paste 05/13/22 1049   Dressing Cleansing/Solutions Not Applicable 05/13/22 1049   Dressing Options Hydrofera Blue Ready;Hypafix  Tape 05/13/22 1049   Dressing Changed Changed 04/26/22 1400   Dressing Status Clean;Dry;Intact 04/26/22 1400   Dressing Change/Treatment Frequency Every 72 hrs, and As Needed 04/26/22 1400   Non-staged Wound Description Full thickness 05/13/22 1049   Wound Length (cm) 1.5 cm 04/26/22 1400   Wound Width (cm) 0.7 cm 04/26/22 1400   Wound Depth (cm) 0.1 cm 04/26/22 1400   Wound Surface Area (cm^2) 1.05 cm^2 04/26/22 1400   Wound Volume (cm^3) 0.105 cm^3 04/26/22 1400   Post-Procedure Length (cm) 0.5 cm 05/13/22 1049   Post-Procedure Width (cm) 2 cm 05/13/22 1049   Post-Procedure Depth (cm) 0.1 cm 05/13/22 1049   Post-Procedure Surface Area (cm^2) 1 cm^2 05/13/22 1049   Post-Procedure Volume (cm^3) 0.1 cm^3 05/13/22 1049   Wound Healing % 38 04/26/22 1400   Tunneling (cm) 0 cm 05/13/22 1049   Undermining (cm) 0 cm 05/13/22 1049   Wound Odor None 05/13/22 1049   Exposed Structures None 05/13/22 1049   Number of days: 24       Wound 04/19/22 Full Thickness Wound Left Posterolateral LE Middle (Active)   Wound Image    05/13/22 1049   Site Assessment Dry;Brown 05/13/22 1049   Periwound Assessment Scar tissue 05/13/22 1049   Margins Attached edges 05/13/22 1049   Closure Secondary intention 04/26/22 1400   Drainage Amount RONNI 05/13/22 1049   Drainage Description Serosanguineous 04/26/22 1400   Treatments Cleansed;Topical Lidocaine;Provider debridement 05/13/22 1049   Wound Cleansing Normal Saline Irrigation 05/13/22 1049   Periwound Protectant Skin Protectant Wipes to Periwound;Barrier Paste 05/13/22 1049   Dressing Cleansing/Solutions Not Applicable 05/13/22 1049   Dressing Options Hydrofera Blue Ready;Hypafix Tape 05/13/22 1049   Dressing Changed Changed 04/26/22 1400   Dressing Status Clean;Dry;Intact 04/26/22 1400   Dressing Change/Treatment Frequency Every 72 hrs, and As Needed 04/26/22 1400   Non-staged Wound Description Full thickness 05/13/22 1049   Wound Length (cm) 0.5 cm 04/26/22 1400   Wound Width (cm) 0.9 cm  04/26/22 1400   Wound Depth (cm) 0.1 cm 04/26/22 1400   Wound Surface Area (cm^2) 0.45 cm^2 04/26/22 1400   Wound Volume (cm^3) 0.045 cm^3 04/26/22 1400   Post-Procedure Length (cm) 1 cm 05/13/22 1049   Post-Procedure Width (cm) 0.2 cm 05/13/22 1049   Post-Procedure Depth (cm) 0.1 cm 05/13/22 1049   Post-Procedure Surface Area (cm^2) 0.2 cm^2 05/13/22 1049   Post-Procedure Volume (cm^3) 0.02 cm^3 05/13/22 1049   Wound Healing % 25 04/26/22 1400   Tunneling (cm) 0 cm 05/13/22 1049   Undermining (cm) 0 cm 05/13/22 1049   Wound Odor None 05/13/22 1049   Exposed Structures None 05/13/22 1049   Number of days: 24       Wound 04/19/22 Full Thickness Wound Left Posterolateral LE Distal (Active)   Wound Image    05/13/22 1049   Site Assessment Pink;Red;Yellow 05/13/22 1049   Periwound Assessment Scar tissue 05/13/22 1049   Margins Attached edges 05/13/22 1049   Closure Secondary intention 04/26/22 1400   Drainage Amount RONNI 05/13/22 1049   Drainage Description Serosanguineous 04/26/22 1400   Treatments Cleansed;Topical Lidocaine;Provider debridement 05/13/22 1049   Wound Cleansing Normal Saline Irrigation 05/13/22 1049   Periwound Protectant Skin Protectant Wipes to Periwound;Barrier Paste 05/13/22 1049   Dressing Cleansing/Solutions Not Applicable 05/13/22 1049   Dressing Options Hydrofera Blue Ready;Hypafix Tape 05/13/22 1049   Dressing Changed Changed 04/26/22 1400   Dressing Status Clean;Dry;Intact 04/26/22 1400   Dressing Change/Treatment Frequency Every 72 hrs, and As Needed 04/26/22 1400   Non-staged Wound Description Full thickness 05/13/22 1049   Wound Length (cm) 1 cm 05/13/22 1049   Wound Width (cm) 2.4 cm 05/13/22 1049   Wound Depth (cm) 0.1 cm 05/13/22 1049   Wound Surface Area (cm^2) 2.4 cm^2 05/13/22 1049   Wound Volume (cm^3) 0.24 cm^3 05/13/22 1049   Post-Procedure Length (cm) 1.1 cm 05/13/22 1049   Post-Procedure Width (cm) 2.5 cm 05/13/22 1049   Post-Procedure Depth (cm) 0.1 cm 05/13/22 1049    Post-Procedure Surface Area (cm^2) 2.75 cm^2 05/13/22 1049   Post-Procedure Volume (cm^3) 0.275 cm^3 05/13/22 1049   Wound Healing % -586 05/13/22 1049   Tunneling (cm) 0 cm 05/13/22 1049   Undermining (cm) 0 cm 05/13/22 1049   Wound Odor None 05/13/22 1049   Exposed Structures None 05/13/22 1049   Number of days: 24       PROCEDURE:   -2% viscous lidocaine applied topically to wound beds for approximately 5 minutes prior to debridement  -Curette used to debride wound beds.  Excisional debridement was performed to remove devitalized tissue until healthy, bleeding tissue was visualized.   Entire surface of wounds, 3.95cm2 debrided.  Tissue debrided into the subcutaneous layer.    -Bleeding controlled with manual pressure.    -Wound care completed by wound RN, refer to flowsheet  -Patient tolerated the procedure well, without c/o pain or discomfort.     PROCEDURE: Trimming and filing of all toenails  -Podiatric clippers used to trim all toenails.  No nicks or cuts  -Marrero board used to smooth rough edges  -Patient tolerated out any difficulty.    Toenails, pretrimming          Toenails, post trimming                Pertinent Labs and Diagnostics:    Labs:     A1c:   Lab Results   Component Value Date/Time    HBA1C 5.4 01/23/2020 04:37 PM    HBA1C 5.7 (H) 11/09/2018 06:30 PM          IMAGING: None    VASCULAR STUDIES: 9/1/2020   Vascular Laboratory   Conclusions   1.  Normal bilateral KOSTAS's.    LAST  WOUND CULTURE:  DATE :   Lab Results   Component Value Date/Time    CULTRSULT - (A) 02/07/2022 02:51 PM    CULTRSULT (A) 02/07/2022 02:51 PM     Methicillin Resistant Staphylococcus aureus  Moderate growth           ASSESSMENT AND PLAN:     1. Wound of left lower extremity, subsequent encounter  Comments: Chronic wounds previously treated at [facility], last seen 2021. Has been receiving treatment with home health. Establishing in clinic 4/19/2022 5/13/2022: Total wound surface area is a bit larger today.  However, wound  edges are diffuse making accurate measurement difficult.  - Wound healing impacted by location with frequent movement posterior knee and tissue largely scarred from chronicity of wound  - Palpable pulses  - No evidence of infection  - Excisional debridement was performed in clinic, medically necessary to promote wound healing.  - Return to clinic weekly for assessment and debridement. Home health to change dressings 1-2 times per week between clinic appointments.   Wound Care: Hydrofera blue, tubigrip    2. Pain    5/13/2022:  - 2% topical lidocaine applied to wounds prior to debridement  - Denies any pain in clinic today    3. Cognitive impairment  Comments: History of phychiatric disorder, appears to have cognitive decline likely due to demential.  Complicating factor, impacts patient's ability for self-care.      - Unable to provide significant history  - Appears to be at baseline  - Does not remember if home health changing dressings    4.  Onychomycosis/overgrown toenails    5/13/2022: Patient presents today with severely overgrown, mycotic toenails.   He is at danger for self injury.  He consents to allowing for trimming and filing of toenails in clinic today.  -All toenails trimmed and filed without incident.  No nicks or cuts.    PATIENT EDUCATION  - Importance of adequate nutrition for wound healing  -Advised to go to ER for any increased redness, swelling, drainage, or odor, or if patient develops fever, chills, nausea or vomiting.     My total time spent caring for the patient on the day of the encounter was 20 minutes.   This does not include time spent on separately billable procedures/tests.       Please note that this note may have been created using voice recognition software. I have worked with technical experts from [employer] to optimize the interface.  I have made every reasonable attempt to correct obvious errors, but there may be errors of grammar and possibly content that I did not discover  before finalizing the note.    N

## 2022-05-13 NOTE — PATIENT INSTRUCTIONS
-Keep dressings clean and dry. Change dressings once between wound clinic visits, and if they become over saturated, soiled or fall off.     -Avoid prolonged standing or sitting without elevating your legs.    -Should you experience any significant changes in your wound(s), such as signs of infection (increasing redness, swelling, localized heat, increased pain, fever > 101 F, chills) or have any questions regarding your home care instructions, please contact the wound center at [phone]. If after hours, contact your primary care physician or go to the hospital emergency room.     -If you are 5 or more minutes late for an appointment, we reserve the right to cancel and reschedule that appointment. Additionally, if you are habitually late or not showing (3 late cancellations and/or no shows), we reserve the right to cancel your remaining appointments and it will be your responsibility to obtain a new referral if services are still needed.

## 2022-05-21 NOTE — CARE PLAN
Problem: Safety  Goal: Will remain free from falls  Outcome: PROGRESSING AS EXPECTED     Problem: Infection  Goal: Will remain free from infection  Outcome: PROGRESSING AS EXPECTED     Problem: Venous Thromboembolism (VTW)/Deep Vein Thrombosis (DVT) Prevention:  Goal: Patient will participate in Venous Thrombosis (VTE)/Deep Vein Thrombosis (DVT)Prevention Measures  Outcome: PROGRESSING AS EXPECTED      21-May-2022 22:49

## 2022-05-25 PROBLEM — F11.20 UNCOMPLICATED OPIOID DEPENDENCE (HCC): Status: ACTIVE | Noted: 2022-05-25

## 2022-05-27 ENCOUNTER — OFFICE VISIT (OUTPATIENT)
Dept: WOUND CARE | Facility: MEDICAL CENTER | Age: 67
End: 2022-05-27
Payer: MEDICARE

## 2022-05-27 VITALS
OXYGEN SATURATION: 94 % | DIASTOLIC BLOOD PRESSURE: 49 MMHG | TEMPERATURE: 98 F | HEART RATE: 63 BPM | RESPIRATION RATE: 17 BRPM | SYSTOLIC BLOOD PRESSURE: 128 MMHG

## 2022-05-27 DIAGNOSIS — R52 PAIN: ICD-10-CM

## 2022-05-27 DIAGNOSIS — S81.802D WOUND OF LEFT LOWER EXTREMITY, SUBSEQUENT ENCOUNTER: Primary | ICD-10-CM

## 2022-05-27 DIAGNOSIS — R41.89 COGNITIVE IMPAIRMENT: ICD-10-CM

## 2022-05-27 DIAGNOSIS — L97.909 CHRONIC SKIN ULCER OF LOWER LEG (HCC): ICD-10-CM

## 2022-05-27 PROCEDURE — 11042 DBRDMT SUBQ TIS 1ST 20SQCM/<: CPT | Performed by: NURSE PRACTITIONER

## 2022-05-27 PROCEDURE — 11042 DBRDMT SUBQ TIS 1ST 20SQCM/<: CPT

## 2022-05-27 PROCEDURE — 99213 OFFICE O/P EST LOW 20 MIN: CPT | Mod: 25

## 2022-05-27 ASSESSMENT — ENCOUNTER SYMPTOMS
ROS SKIN COMMENTS: CHRONIC WOUNDS
FEVER: 0
CHILLS: 0

## 2022-05-27 NOTE — PATIENT INSTRUCTIONS
-Keep dressings clean and dry. Change dressings if they become over saturated, soiled or fall off. Otherwise, your home health nurse will change your dressings between wound clinic visits.    -Avoid prolonged standing or sitting without elevating your legs.    -Should you experience any significant changes in your wound, such as signs of infection (increasing redness, swelling, localized heat, increased pain, fever > 101 F, chills) or have any questions regarding your home care instructions, please contact the wound center at [phone]. If after hours, contact your primary care physician or go to the hospital emergency room.     -If you are 5 or more minutes late for an appointment, we reserve the right to cancel and reschedule that appointment. Additionally, if you are habitually late or not showing (3 late cancellations and/or no shows), we reserve the right to cancel your remaining appointments and it will be your responsibility to obtain a new referral if services are still needed.

## 2022-05-27 NOTE — PROGRESS NOTES
Provider Encounter- Full Thickness wound    HISTORY OF PRESENT ILLNESS  Wound History:    START OF CARE IN CLINIC: 4/19/2022 (return to clinic)    REFERRING PROVIDER: Juan C Worthington      WOUND- Full Thickness Wound   LOCATION: Left medial lower extremity / posterior knee   HISTORY:  Patient is a 65-year-old male with a past medical history of alcohol dependence, tobacco dependence, psychiatric disorder, and HTN who presented to the emergency department on 8/7/2020 with a left lower extremity wound infection. He was evaluated by orthopedic surgery who recommended ongoing wound care. Wound cultures at that time grew MSSA and Streptococcus. He had been seen at [facility] earlier and was prescribed antibiotics, but never filled the prescription.  An ultrasound of that extremity was done and was negative for DVT.  He was treated for sepsis secondary to cellulitis and completed an antibiotic course on 9/16/2020.  He underwent multiple bedside debridements and aggressive wound care.  Over admission he was also found to have head lice and underwent treatment for that. A repeat wound culture was done on 9/28/2020 and grew Strep A and Proteus. Infectious disease was consulted and recommended a 5-day course of IV zosyn which was completed on 10/5/2020. On 10/12/2020 the wound care team noticed increased inflammation to the proximal part of the wound bed and switched from a vera flow wound VAC to a regular wound VAC.  ID was again consulted and on 10/14/2020 recommended a 7-day course of antibiotics with meropenem which was completed on 10/22/2020.  On 11/19/2020 he underwent left lower extremity debridement with wound VAC placement.  He continues to undergo wound care and has wound VAC in place.  His wound appears to be healing appropriately.  He will require ongoing wound care outside the hospital. Additionally, he was noted to have intermittent agitation secondary to underlying psychiatric disorder and was  started on risperdal, depakote, and gabapentin per psychiatry recommendations.  He has remained stable but has been deemed incapacitated to make medical decisions so guardianship was pursued and granted on 1/29/21.  He was deemed  appropriate for group home placement with home health ordered for wound care and wound VAC management.  He was discharged to group home on 5/3/2021 home health coming by and doing wound VAC changes 2 times per week.    Patient was following with [facility], receiving active treatment for left lower extremity wounds. He was last seen in clinic on 8/20/2021 and was lost to follow up. He is currently living in group home and home health has been managing wounds.    Pertinent Medical History: Alcohol dependence (in remission), tobacco dependence, psychiatric disorder, cognitive disorder.    TOBACCO USE:  Former smoker    Patient's problem list, allergies, and current medications reviewed and updated in Epic    Interval History:  4/19/2022: Initial Clinic visit with Baldomero Kasper MD. Patient returns to clinic following prolonged absence. Patient unable to provide meaningful history. He denies any complaints of pain, fevers, or chills. Patient is currently resident at group home and wounds have been managed by home health. Wounds appear chronic and largely unchanged from previous assessment. Wounds with some slough and no evidence of infection on this visit.    4/26/2022: Clinic visit with Baldomero Kasper MD. Patient reports feeling fine. He denies any symptoms of infection. Patient does not remember if home health is changing dressings. Nursing to make sure he has home health with [facility]. Wounds slightly smaller, wound healing complicated by location with frequent movement and periwound scar tissue.    5/13/2022 : Clinic visit with RAFITA Rueda, FNP-BC, CWOCN, CFCN.  Patient returns to clinic today after 3-week absence.  He was a no-show for his last appointment.  He did not know why he  missed his last appointment.  States he is feeling well overall.  He does express some concern about his toenails, which are severely overgrown and mycotic.  He agrees to allow for trimming and filing of these in clinic today.    5/27/2022 : Clinic visit with RAFITA Rueda, FNCINTHIA-BC, CWMATTEON, CFCN.  Bola states he is feeling well, denies fevers, chills, nausea, vomiting, cough or shortness of breath..  Last seen in the clinic 2 weeks ago.      REVIEW OF SYSTEMS:   Review of Systems   Unable to perform ROS: Dementia   Constitutional: Negative for chills, fever and malaise/fatigue.   Skin:        Chronic wounds       PHYSICAL EXAMINATION:   /49 (BP Location: Left arm, Patient Position: Sitting)   Pulse 63   Temp 36.7 °C (98 °F) (Temporal)   Resp 17   SpO2 94%     Physical Exam  Constitutional:       General: He is not in acute distress.  Cardiovascular:      Rate and Rhythm: Normal rate.      Comments: Palpable DP and PT pulses  Pulmonary:      Effort: Pulmonary effort is normal. No respiratory distress.   Musculoskeletal:      Right lower leg: No edema.      Left lower leg: Edema present.   Skin:     General: Skin is warm.      Comments: Chronic full thickness wounds left posterior knee    See flowsheet for details   Neurological:      General: No focal deficit present.      Mental Status: He is alert. Mental status is at baseline.      Comments: Orientated to person but not to place or year  Appears to be baseline         WOUND ASSESSMENT  Wound 04/19/22 Full Thickness Wound Leg Left --Left Posterolateral Lower Leg (Popliteal Fossa) (Active)   Wound Image    05/27/22 0955   Site Assessment Red;Yellow 05/27/22 0955   Periwound Assessment Scar tissue 05/27/22 0955   Margins Attached edges 05/27/22 0955   Closure Secondary intention 04/26/22 1400   Drainage Amount RONNI 05/27/22 0955   Drainage Description RONNI 05/27/22 0955   Treatments Cleansed;Topical Lidocaine;Provider debridement 05/27/22 0955   Wound  Cleansing Normal Saline Irrigation 05/27/22 0955   Periwound Protectant Skin Protectant Wipes to Periwound 05/27/22 0955   Dressing Cleansing/Solutions Not Applicable 05/27/22 0955   Dressing Options Hydrofera Blue Ready;Hypafix Tape 05/27/22 0955   Dressing Changed Changed 05/27/22 0955   Dressing Status Clean;Dry;Intact 04/26/22 1400   Dressing Change/Treatment Frequency Every 72 hrs, and As Needed 04/26/22 1400   Non-staged Wound Description Full thickness 05/27/22 0955   Wound Length (cm) 1.1 cm 05/27/22 0955   Wound Width (cm) 2 cm 05/27/22 0955   Wound Depth (cm) 0.2 cm 05/27/22 0955   Wound Surface Area (cm^2) 2.2 cm^2 05/27/22 0955   Wound Volume (cm^3) 0.44 cm^3 05/27/22 0955   Post-Procedure Length (cm) 1.2 cm 05/27/22 0955   Post-Procedure Width (cm) 2.2 cm 05/27/22 0955   Post-Procedure Depth (cm) 0.2 cm 05/27/22 0955   Post-Procedure Surface Area (cm^2) 2.64 cm^2 05/27/22 0955   Post-Procedure Volume (cm^3) 0.528 cm^3 05/27/22 0955   Wound Healing % -1157 05/27/22 0955   Tunneling (cm) 0 cm 05/27/22 0955   Undermining (cm) 0 cm 05/27/22 0955   Wound Odor None 05/27/22 0955   Exposed Structures None 05/27/22 0955   Number of days: 38       PROCEDURE:   -2% viscous lidocaine applied topically to wound beds for approximately 5 minutes prior to debridement  -Curette used to debride wound beds.  Excisional debridement was performed to remove devitalized tissue until healthy, bleeding tissue was visualized.   Entire surface of wounds, 2.64 cm2 debrided.  Tissue debrided into the subcutaneous layer.    -Bleeding controlled with manual pressure.    -Wound care completed by wound RN, refer to flowsheet  -Patient tolerated the procedure well, without c/o pain or discomfort.       Pertinent Labs and Diagnostics:    Labs:     A1c:   Lab Results   Component Value Date/Time    HBA1C 5.4 01/23/2020 04:37 PM    HBA1C 5.7 (H) 11/09/2018 06:30 PM          IMAGING: None    VASCULAR STUDIES: 9/1/2020   Vascular  Laboratory   Conclusions   1.  Normal bilateral KOSTAS's.    LAST  WOUND CULTURE:  DATE :   Lab Results   Component Value Date/Time    CULTRSULT - (A) 02/07/2022 02:51 PM    CULTRSULT (A) 02/07/2022 02:51 PM     Methicillin Resistant Staphylococcus aureus  Moderate growth           ASSESSMENT AND PLAN:     1. Wound of left lower extremity, subsequent encounter  Comments: Chronic wounds previously treated at [facility], last seen 2021. Has been receiving treatment with home health. Establishing in clinic 4/19/2022 5/27/2022: Patient now has only 1 wound.  Surface area has decreased significantly since last assessment.  - Wound healing impacted by location with frequent movement posterior knee and tissue largely scarred from chronicity of wound  - Palpable pulses  - No evidence of infection  - Excisional debridement was performed in clinic, medically necessary to promote wound healing.  - Return to clinic weekly for assessment and debridement. Home health to change dressings 1-2 times per week between clinic appointments.   Wound Care: Hydrofera blue, tubigrip    2. Pain    5/27/2022:  - 2% topical lidocaine applied to wounds prior to debridement  - Denies any pain in clinic today    3. Cognitive impairment  Comments: History of phychiatric disorder, appears to have cognitive decline likely due to demential.  Complicating factor, impacts patient's ability for self-care.      - Unable to provide significant history  - Appears to be at baseline  - Does not remember if home health changing dressings        PATIENT EDUCATION  - Importance of adequate nutrition for wound healing  -Advised to go to ER for any increased redness, swelling, drainage, or odor, or if patient develops fever, chills, nausea or vomiting.     My total time spent caring for the patient on the day of the encounter was 20 minutes.   This does not include time spent on separately billable procedures/tests.       Please note that this note may have been created  using voice recognition software. I have worked with technical experts from [facility] to optimize the interface.  I have made every reasonable attempt to correct obvious errors, but there may be errors of grammar and possibly content that I did not discover before finalizing the note.    N

## 2022-06-02 ENCOUNTER — OFFICE VISIT (OUTPATIENT)
Dept: WOUND CARE | Facility: MEDICAL CENTER | Age: 67
End: 2022-06-02
Payer: MEDICARE

## 2022-06-02 VITALS
OXYGEN SATURATION: 93 % | DIASTOLIC BLOOD PRESSURE: 69 MMHG | HEART RATE: 74 BPM | SYSTOLIC BLOOD PRESSURE: 124 MMHG | RESPIRATION RATE: 18 BRPM | TEMPERATURE: 99.2 F

## 2022-06-02 DIAGNOSIS — R52 PAIN: ICD-10-CM

## 2022-06-02 DIAGNOSIS — R41.89 COGNITIVE IMPAIRMENT: ICD-10-CM

## 2022-06-02 DIAGNOSIS — S81.802D WOUND OF LEFT LOWER EXTREMITY, SUBSEQUENT ENCOUNTER: Primary | ICD-10-CM

## 2022-06-02 PROCEDURE — 99213 OFFICE O/P EST LOW 20 MIN: CPT

## 2022-06-02 PROCEDURE — 99213 OFFICE O/P EST LOW 20 MIN: CPT | Performed by: NURSE PRACTITIONER

## 2022-06-02 ASSESSMENT — ENCOUNTER SYMPTOMS
ROS SKIN COMMENTS: CHRONIC WOUNDS
FEVER: 0
CHILLS: 0

## 2022-06-02 NOTE — PATIENT INSTRUCTIONS
-Keep dressings clean and dry. Change dressings once between wound clinic visits, and if they become over saturated, soiled or fall off.     -Avoid prolonged standing or sitting without elevating your legs.    -Remove your compression garments if you have severe pain, severe swelling, numbness, color change, or temperature change in your toes. If you need to remove your compression garments, do so by unrolling them. Do not cut the compression garments off, this is to prevent cutting yourself on accident.    -Should you experience any significant changes in your wound(s), such as signs of infection (increasing redness, swelling, localized heat, increased pain, fever > 101 F, chills) or have any questions regarding your home care instructions, please contact the wound center at [phone]. If after hours, contact your primary care physician or go to the hospital emergency room.     -If you are 5 or more minutes late for an appointment, we reserve the right to cancel and reschedule that appointment. Additionally, if you are habitually late or not showing (3 late cancellations and/or no shows), we reserve the right to cancel your remaining appointments and it will be your responsibility to obtain a new referral if services are still needed.

## 2022-06-02 NOTE — PROGRESS NOTES
Provider Encounter- Full Thickness wound    HISTORY OF PRESENT ILLNESS  Wound History:    START OF CARE IN CLINIC: 4/19/2022 (return to clinic)    REFERRING PROVIDER: Juan C Worthington      WOUND- Full Thickness Wound   LOCATION: Left medial lower extremity / posterior knee   HISTORY:  Patient is a 65-year-old male with a past medical history of alcohol dependence, tobacco dependence, psychiatric disorder, and HTN who presented to the emergency department on 8/7/2020 with a left lower extremity wound infection. He was evaluated by orthopedic surgery who recommended ongoing wound care. Wound cultures at that time grew MSSA and Streptococcus. He had been seen at [facility] earlier and was prescribed antibiotics, but never filled the prescription.  An ultrasound of that extremity was done and was negative for DVT.  He was treated for sepsis secondary to cellulitis and completed an antibiotic course on 9/16/2020.  He underwent multiple bedside debridements and aggressive wound care.  Over admission he was also found to have head lice and underwent treatment for that. A repeat wound culture was done on 9/28/2020 and grew Strep A and Proteus. Infectious disease was consulted and recommended a 5-day course of IV zosyn which was completed on 10/5/2020. On 10/12/2020 the wound care team noticed increased inflammation to the proximal part of the wound bed and switched from a vera flow wound VAC to a regular wound VAC.  ID was again consulted and on 10/14/2020 recommended a 7-day course of antibiotics with meropenem which was completed on 10/22/2020.  On 11/19/2020 he underwent left lower extremity debridement with wound VAC placement.  He continues to undergo wound care and has wound VAC in place.  His wound appears to be healing appropriately.  He will require ongoing wound care outside the hospital. Additionally, he was noted to have intermittent agitation secondary to underlying psychiatric disorder and was  started on risperdal, depakote, and gabapentin per psychiatry recommendations.  He has remained stable but has been deemed incapacitated to make medical decisions so guardianship was pursued and granted on 1/29/21.  He was deemed  appropriate for group home placement with home health ordered for wound care and wound VAC management.  He was discharged to group home on 5/3/2021 home health coming by and doing wound VAC changes 2 times per week.    Patient was following with [facility], receiving active treatment for left lower extremity wounds. He was last seen in clinic on 8/20/2021 and was lost to follow up. He is currently living in group home and home health has been managing wounds.    Pertinent Medical History: Alcohol dependence (in remission), tobacco dependence, psychiatric disorder, cognitive disorder.    TOBACCO USE:  Former smoker    Patient's problem list, allergies, and current medications reviewed and updated in Epic    Interval History:  4/19/2022: Initial Clinic visit with Baldomero Kasper MD. Patient returns to clinic following prolonged absence. Patient unable to provide meaningful history. He denies any complaints of pain, fevers, or chills. Patient is currently resident at group home and wounds have been managed by home health. Wounds appear chronic and largely unchanged from previous assessment. Wounds with some slough and no evidence of infection on this visit.    4/26/2022: Clinic visit with Baldomero Kasper MD. Patient reports feeling fine. He denies any symptoms of infection. Patient does not remember if home health is changing dressings. Nursing to make sure he has home health with [facility]. Wounds slightly smaller, wound healing complicated by location with frequent movement and periwound scar tissue.    5/13/2022 : Clinic visit with RAFITA Rueda, FNP-BC, CWOCN, CFCN.  Patient returns to clinic today after 3-week absence.  He was a no-show for his last appointment.  He did not know why he  missed his last appointment.  States he is feeling well overall.  He does express some concern about his toenails, which are severely overgrown and mycotic.  He agrees to allow for trimming and filing of these in clinic today.    5/27/2022 : Clinic visit with RAFITA Rueda, FNP-BC, CWOCN, CFCN.  Bola states he is feeling well, denies fevers, chills, nausea, vomiting, cough or shortness of breath..  Last seen in the clinic 2 weeks ago.    6/2/2022: Clinic visit with RAFITA Rdz.  Patient's wound continues to progress slowly.  No need for excisional debridement clinic today.  Overall patient feels well he states he is bored with the lack of activities patient lives in a group home.      REVIEW OF SYSTEMS:   Review of Systems   Unable to perform ROS: Dementia   Constitutional: Negative for chills, fever and malaise/fatigue.   Skin:        Chronic wounds       PHYSICAL EXAMINATION:   /69 (BP Location: Right arm, Patient Position: Sitting)   Pulse 74   Temp 37.3 °C (99.2 °F)   Resp 18   SpO2 93%     Physical Exam  Constitutional:       General: He is not in acute distress.  Cardiovascular:      Rate and Rhythm: Normal rate.      Comments: Palpable DP and PT pulses  Pulmonary:      Effort: Pulmonary effort is normal. No respiratory distress.   Musculoskeletal:      Right lower leg: No edema.      Left lower leg: Edema present.   Skin:     General: Skin is warm.      Comments: Chronic full thickness wounds left posterior knee    See flowsheet for details   Neurological:      General: No focal deficit present.      Mental Status: He is alert. Mental status is at baseline.      Comments: Orientated to person but not to place or year  Appears to be baseline         WOUND ASSESSMENT  Wound 04/19/22 Full Thickness Wound Leg Left --Left Posterolateral Lower Leg (Popliteal Fossa) (Active)   Wound Image   06/02/22 1330   Site Assessment Red 06/02/22 1330   Periwound Assessment Scar tissue 06/02/22 1330    Margins Attached edges 06/02/22 1330   Closure Secondary intention 04/26/22 1400   Drainage Amount Moderate 06/02/22 1330   Drainage Description Serosanguineous 06/02/22 1330   Treatments Cleansed;Site care 06/02/22 1330   Wound Cleansing Puracyn [place] 06/02/22 1330   Periwound Protectant Skin Protectant Wipes to Periwound;Barrier Paste 06/02/22 1330   Dressing Cleansing/Solutions Normal Saline 06/02/22 1330   Dressing Options Collagen Dressing;Hydrofera Blue Ready;Hypafix Tape;Tubigrip 06/02/22 1330   Dressing Changed Changed 05/27/22 0955   Dressing Status Clean;Dry;Intact 04/26/22 1400   Dressing Change/Treatment Frequency Every 72 hrs, and As Needed 04/26/22 1400   Non-staged Wound Description Full thickness 06/02/22 1330   Wound Length (cm) 1.1 cm 06/02/22 1330   Wound Width (cm) 1.5 cm 06/02/22 1330   Wound Depth (cm) 0.1 cm 06/02/22 1330   Wound Surface Area (cm^2) 1.65 cm^2 06/02/22 1330   Wound Volume (cm^3) 0.165 cm^3 06/02/22 1330   Post-Procedure Length (cm) 1.2 cm 05/27/22 0955   Post-Procedure Width (cm) 2.2 cm 05/27/22 0955   Post-Procedure Depth (cm) 0.2 cm 05/27/22 0955   Post-Procedure Surface Area (cm^2) 2.64 cm^2 05/27/22 0955   Post-Procedure Volume (cm^3) 0.528 cm^3 05/27/22 0955   Wound Healing % -371 06/02/22 1330   Tunneling (cm) 0 cm 06/02/22 1330   Undermining (cm) 0 cm 06/02/22 1330   Wound Odor None 06/02/22 1330   Exposed Structures None 06/02/22 1330   Number of days: 44       PROCEDURE:   - no excisional debridement required in clinic today.      Pertinent Labs and Diagnostics:    Labs:     A1c:   Lab Results   Component Value Date/Time    HBA1C 5.4 01/23/2020 04:37 PM    HBA1C 5.7 (H) 11/09/2018 06:30 PM          IMAGING: None    VASCULAR STUDIES: 9/1/2020   Vascular Laboratory   Conclusions   1.  Normal bilateral KOSTAS's.    LAST  WOUND CULTURE:  DATE :   Lab Results   Component Value Date/Time    CULTRSULT - (A) 02/07/2022 02:51 PM    CULTRSULT (A) 02/07/2022 02:51 PM      Methicillin Resistant Staphylococcus aureus  Moderate growth           ASSESSMENT AND PLAN:     1. Wound of left lower extremity, subsequent encounter  Comments: Chronic wounds previously treated at [facility], last seen 2021. Has been receiving treatment with home health. Establishing in clinic 4/19/2022 06/02/22: Patient now has only 1 wound.  Wound continues to progress slowly.  -Wound healing is hampered by the chronic movement due to its location to the posterior knee.  -No signs or symptoms of infection this clinic visit  -No excisional debridement required in clinic today.  - Return to clinic weekly for assessment and debridement. Home health to change dressings 1-2 times per week between clinic appointments.   Wound Care: Collagen, Hydrofera Blue ready, Hypafix tape, Tubigrip D    2. Pain    06/02/22:  - 2% topical lidocaine applied to wounds prior to debridement  - Denies any pain in clinic today    3. Cognitive impairment  Comments: History of phychiatric disorder, appears to have cognitive decline likely due to demential.  Complicating factor, impacts patient's ability for self-care.    - Unable to provide significant history  - Appears to be at baseline          PATIENT EDUCATION  - Importance of adequate nutrition for wound healing  -Advised to go to ER for any increased redness, swelling, drainage, or odor, or if patient develops fever, chills, nausea or vomiting.     My total time spent caring for the patient on the day of the encounter was 20 minutes.   This does not include time spent on separately billable procedures/tests.       Please note that this note may have been created using voice recognition software. I have worked with technical experts from [employer] to optimize the interface.  I have made every reasonable attempt to correct obvious errors, but there may be errors of grammar and possibly content that I did not discover before finalizing the note.    N

## 2022-06-13 ENCOUNTER — NON-PROVIDER VISIT (OUTPATIENT)
Dept: WOUND CARE | Facility: MEDICAL CENTER | Age: 67
End: 2022-06-13
Payer: MEDICARE

## 2022-06-13 PROCEDURE — 97602 WOUND(S) CARE NON-SELECTIVE: CPT

## 2022-06-13 NOTE — PATIENT INSTRUCTIONS
-Keep your wound dressing clean, dry, and intact.    -Change your dressing if it becomes soiled, soaked, or falls off.    - Resolved wound be fragile for a few days, bathe and dry area gently, only ever regains a maximum of 80% of the tensile strength of the surrounding skin, remodeling of scar can continue for 6mo - a year. Contact PCP for a referral back her if any problems with area opening and draining again.    -Should you experience any significant changes in your wound(s), such as infection (redness, swelling, localized heat, increased pain, fever > 101 F, chills) or have any questions regarding your home care instructions, please contact the wound center at [phone]. If after hours, contact your primary care physician or go to the hospital emergency room.

## 2022-06-13 NOTE — PROCEDURES
Non-selective debridement using normal saline and gauze to remove old drainage from wound and periwound.  Pt tolerated well.

## 2022-06-21 ENCOUNTER — OFFICE VISIT (OUTPATIENT)
Dept: WOUND CARE | Facility: MEDICAL CENTER | Age: 67
End: 2022-06-21
Payer: MEDICARE

## 2022-06-21 VITALS
OXYGEN SATURATION: 96 % | TEMPERATURE: 98.4 F | RESPIRATION RATE: 18 BRPM | HEART RATE: 66 BPM | SYSTOLIC BLOOD PRESSURE: 131 MMHG | DIASTOLIC BLOOD PRESSURE: 91 MMHG

## 2022-06-21 DIAGNOSIS — S81.802D WOUND OF LEFT LOWER EXTREMITY, SUBSEQUENT ENCOUNTER: Primary | ICD-10-CM

## 2022-06-21 DIAGNOSIS — R52 PAIN: ICD-10-CM

## 2022-06-21 DIAGNOSIS — R41.89 COGNITIVE IMPAIRMENT: ICD-10-CM

## 2022-06-21 PROCEDURE — 99213 OFFICE O/P EST LOW 20 MIN: CPT | Mod: 25

## 2022-06-21 PROCEDURE — 11042 DBRDMT SUBQ TIS 1ST 20SQCM/<: CPT

## 2022-06-21 PROCEDURE — 11042 DBRDMT SUBQ TIS 1ST 20SQCM/<: CPT | Performed by: NURSE PRACTITIONER

## 2022-06-21 ASSESSMENT — ENCOUNTER SYMPTOMS
CHILLS: 0
FEVER: 0
ROS SKIN COMMENTS: CHRONIC WOUNDS

## 2022-06-21 NOTE — PROGRESS NOTES
Provider Encounter- Full Thickness wound    HISTORY OF PRESENT ILLNESS  Wound History:   START OF CARE IN CLINIC: 4/19/2022 (return to clinic)   REFERRING PROVIDER: Juan C Worthington    WOUND- Full Thickness Wound   LOCATION: Left medial lower extremity / posterior knee   HISTORY:  Patient is a 65-year-old male with a past medical history of alcohol dependence, tobacco dependence, psychiatric disorder, and HTN who presented to the emergency department on 8/7/2020 with a left lower extremity wound infection. He was evaluated by orthopedic surgery who recommended ongoing wound care. Wound cultures at that time grew MSSA and Streptococcus. He had been seen at [facility] earlier and was prescribed antibiotics, but never filled the prescription.  An ultrasound of that extremity was done and was negative for DVT.  He was treated for sepsis secondary to cellulitis and completed an antibiotic course on 9/16/2020.  He underwent multiple bedside debridements and aggressive wound care.  Over admission he was also found to have head lice and underwent treatment for that. A repeat wound culture was done on 9/28/2020 and grew Strep A and Proteus. Infectious disease was consulted and recommended a 5-day course of IV zosyn which was completed on 10/5/2020. On 10/12/2020 the wound care team noticed increased inflammation to the proximal part of the wound bed and switched from a vera flow wound VAC to a regular wound VAC.  ID was again consulted and on 10/14/2020 recommended a 7-day course of antibiotics with meropenem which was completed on 10/22/2020.  On 11/19/2020 he underwent left lower extremity debridement with wound VAC placement.  He continues to undergo wound care and has wound VAC in place.  His wound appears to be healing appropriately.  He will require ongoing wound care outside the hospital. Additionally, he was noted to have intermittent agitation secondary to underlying psychiatric disorder and was  started on risperdal, depakote, and gabapentin per psychiatry recommendations.  He has remained stable but has been deemed incapacitated to make medical decisions so guardianship was pursued and granted on 1/29/21.  He was deemed  appropriate for group home placement with home health ordered for wound care and wound VAC management.  He was discharged to group home on 5/3/2021 home health coming by and doing wound VAC changes 2 times per week.    Patient was following with [facility], receiving active treatment for left lower extremity wounds. He was last seen in clinic on 8/20/2021 and was lost to follow up. He is currently living in group home and home health has been managing wounds.    Pertinent Medical History: Alcohol dependence (in remission), tobacco dependence, psychiatric disorder, cognitive disorder.    TOBACCO USE:  Former smoker    Patient's problem list, allergies, and current medications reviewed and updated in Epic    Interval History:  4/19/2022: Initial Clinic visit with Baldomero Kasper MD. Patient returns to clinic following prolonged absence. Patient unable to provide meaningful history. He denies any complaints of pain, fevers, or chills. Patient is currently resident at group home and wounds have been managed by home health. Wounds appear chronic and largely unchanged from previous assessment. Wounds with some slough and no evidence of infection on this visit.    4/26/2022: Clinic visit with Baldomero Kasper MD. Patient reports feeling fine. He denies any symptoms of infection. Patient does not remember if home health is changing dressings. Nursing to make sure he has home health with [facility]. Wounds slightly smaller, wound healing complicated by location with frequent movement and periwound scar tissue.    5/13/2022 : Clinic visit with RAFITA Rueda, FNP-BC, CWOCN, CFCN.  Patient returns to clinic today after 3-week absence.  He was a no-show for his last appointment.  He did not know why he  missed his last appointment.  States he is feeling well overall.  He does express some concern about his toenails, which are severely overgrown and mycotic.  He agrees to allow for trimming and filing of these in clinic today.    5/27/2022 : Clinic visit with RAFITA Rueda, FNP-BC, CWMATTEON, CFCN.  Bola states he is feeling well, denies fevers, chills, nausea, vomiting, cough or shortness of breath..  Last seen in the clinic 2 weeks ago.    6/2/2022: Clinic visit with RAFITA Rdz.  Patient's wound continues to progress slowly.  No need for excisional debridement clinic today.  Overall patient feels well he states he is bored with the lack of activities patient lives in a group home.    6/21/2022: Clinic visit with RAFITA Rdz.  Patient reports that overall he feels well denies any fever or chills.  Patient's wound is slowly progressing however, progression is hampered by the location of the wound with constant flexion and extension.  Home health orders faxed to [facility].      REVIEW OF SYSTEMS:   Review of Systems   Unable to perform ROS: Dementia   Constitutional: Negative for chills, fever and malaise/fatigue.   Skin:        Chronic wounds       PHYSICAL EXAMINATION:   BP (!) 131/91 (BP Location: Right arm, Patient Position: Sitting) Comment: RN notified  Pulse 66   Temp 36.9 °C (98.4 °F)   Resp 18   SpO2 96%     Physical Exam  Constitutional:       General: He is not in acute distress.  Cardiovascular:      Rate and Rhythm: Normal rate.      Comments: Palpable DP and PT pulses  Pulmonary:      Effort: Pulmonary effort is normal. No respiratory distress.   Musculoskeletal:      Right lower leg: No edema.      Left lower leg: Edema present.   Skin:     General: Skin is warm.      Comments: Chronic full thickness wounds left posterior knee    See flowsheet for details   Neurological:      General: No focal deficit present.      Mental Status: He is alert. Mental status is at baseline.       Comments: Oriented to person         WOUND ASSESSMENT  Wound 04/19/22 Full Thickness Wound Leg Left --Left Posterolateral Lower Leg (Popliteal Fossa) (Active)   Wound Image    06/21/22 1015   Site Assessment Red 06/21/22 1015   Periwound Assessment Scar tissue;Dry 06/21/22 1015   Margins Attached edges 06/21/22 1015   Closure Secondary intention 04/26/22 1400   Drainage Amount Scant 06/21/22 1015   Drainage Description Serosanguineous 06/21/22 1015   Treatments Cleansed;Topical Lidocaine;Provider debridement 06/21/22 1015   Wound Cleansing Normal Saline Irrigation 06/21/22 1015   Periwound Protectant Skin Moisturizer;Barrier Paste 06/21/22 1015   Dressing Cleansing/Solutions Not Applicable 06/21/22 1015   Dressing Options Hydrofera Blue Ready;Dry Roll Gauze;Hypafix Tape;Spandage 06/21/22 1015   Dressing Changed Changed 05/27/22 0955   Dressing Status Clean;Dry;Intact 04/26/22 1400   Dressing Change/Treatment Frequency Every 72 hrs, and As Needed 04/26/22 1400   Non-staged Wound Description Full thickness 06/21/22 1015   Wound Length (cm) 1 cm 06/21/22 1015   Wound Width (cm) 2 cm 06/21/22 1015   Wound Depth (cm) 0.1 cm 06/21/22 1015   Wound Surface Area (cm^2) 2 cm^2 06/21/22 1015   Wound Volume (cm^3) 0.2 cm^3 06/21/22 1015   Post-Procedure Length (cm) 1 cm 06/21/22 1015   Post-Procedure Width (cm) 2.1 cm 06/21/22 1015   Post-Procedure Depth (cm) 0.1 cm 06/21/22 1015   Post-Procedure Surface Area (cm^2) 2.1 cm^2 06/21/22 1015   Post-Procedure Volume (cm^3) 0.21 cm^3 06/21/22 1015   Wound Healing % -471 06/21/22 1015   Tunneling (cm) 0 cm 06/21/22 1015   Undermining (cm) 0 cm 06/21/22 1015   Wound Odor None 06/21/22 1015   Exposed Structures None 06/21/22 1015   Number of days: 63          PROCEDURE:   -2% viscous lidocaine applied topically to wound bed for approximately 5 minutes prior to debridement  -Curette used to debride wound bed.  Excisional debridement was performed to remove devitalized tissue until  healthy, bleeding tissue was visualized.   Entire surface of wound, 2.1 cm2 debrided.  Tissue debrided into the subcutaneous layer.    -Bleeding controlled with manual pressure.    -Wound care completed by wound RN, refer to flowsheet  -Patient tolerated the procedure well, without c/o pain or discomfort.       Pertinent Labs and Diagnostics:    Labs:     A1c:   Lab Results   Component Value Date/Time    HBA1C 5.4 01/23/2020 04:37 PM    HBA1C 5.7 (H) 11/09/2018 06:30 PM          IMAGING: None    VASCULAR STUDIES: 9/1/2020   Vascular Laboratory   Conclusions   1.  Normal bilateral KOSTAS's.    LAST  WOUND CULTURE:  DATE :   Lab Results   Component Value Date/Time    CULTRSULT - (A) 02/07/2022 02:51 PM    CULTRSULT (A) 02/07/2022 02:51 PM     Methicillin Resistant Staphylococcus aureus  Moderate growth           ASSESSMENT AND PLAN:     1. Wound of left lower extremity, subsequent encounter  Comments: Chronic wounds previously treated at [facility], last seen 2021. Has been receiving treatment with home health. Establishing in clinic 4/19/2022 06/21/22: Wound approximately the same size however, there are no signs or symptoms of infection.  Suspect the area is slow to progress due to the constant extension and flexion due to location of the wound to the posterior knee.  -Excisional debridement performed to stimulate granulation tissue formation.  -No signs or symptoms of infection this clinic visit  -Patient to return to the clinic weekly for assessment and debridement if necessary.  Home health changes wound care dressings 1-2 times per week.   Wound Care: Sween cream to periwound scar tissue, zinc oxide to periwound area, Hydrofera Blue ready, dry roll gauze, Hypafix tape, Spandage    2. Pain    06/02/22:  - 2% topical lidocaine applied to wounds prior to debridement  -Patient denies any pain following debridement    3. Cognitive impairment  Comments: History of phychiatric disorder, appears to have cognitive decline  likely due to demential.  Complicating factor, impacts patient's ability for self-care.    - Unable to provide significant history  - Appears to be at baseline          PATIENT EDUCATION  - Importance of adequate nutrition for wound healing  -Advised to go to ER for any increased redness, swelling, drainage, or odor, or if patient develops fever, chills, nausea or vomiting.         Please note that this note may have been created using voice recognition software. I have worked with technical experts from [facility] to optimize the interface.  I have made every reasonable attempt to correct obvious errors, but there may be errors of grammar and possibly content that I did not discover before finalizing the note.    N

## 2022-06-21 NOTE — PATIENT INSTRUCTIONS
-Keep your wound dressing clean, dry, and intact.    -Change your dressing if it becomes soiled, soaked, or falls off.    -Should you experience any significant changes in your wound(s), such as infection (redness, swelling, localized heat, increased pain, fever > 101 F, chills) or have any questions regarding your home care instructions, please contact the wound center at [phone]. If after hours, contact your primary care physician or go to the hospital emergency room.

## 2022-06-28 ENCOUNTER — OFFICE VISIT (OUTPATIENT)
Dept: WOUND CARE | Facility: MEDICAL CENTER | Age: 67
End: 2022-06-28
Payer: MEDICARE

## 2022-06-28 VITALS
OXYGEN SATURATION: 93 % | HEART RATE: 74 BPM | DIASTOLIC BLOOD PRESSURE: 87 MMHG | SYSTOLIC BLOOD PRESSURE: 129 MMHG | TEMPERATURE: 98.5 F | RESPIRATION RATE: 18 BRPM

## 2022-06-28 DIAGNOSIS — R52 PAIN: ICD-10-CM

## 2022-06-28 DIAGNOSIS — Z48.00 ENCOUNTER FOR CHANGE OF DRESSING: ICD-10-CM

## 2022-06-28 DIAGNOSIS — S81.802D WOUND OF LEFT LOWER EXTREMITY, SUBSEQUENT ENCOUNTER: ICD-10-CM

## 2022-06-28 DIAGNOSIS — R41.89 COGNITIVE IMPAIRMENT: ICD-10-CM

## 2022-06-28 DIAGNOSIS — L97.929 ULCER OF LEFT LOWER EXTREMITY, UNSPECIFIED ULCER STAGE (HCC): ICD-10-CM

## 2022-06-28 PROCEDURE — 99213 OFFICE O/P EST LOW 20 MIN: CPT

## 2022-06-28 PROCEDURE — 11042 DBRDMT SUBQ TIS 1ST 20SQCM/<: CPT

## 2022-06-28 PROCEDURE — 11042 DBRDMT SUBQ TIS 1ST 20SQCM/<: CPT | Performed by: NURSE PRACTITIONER

## 2022-06-28 ASSESSMENT — ENCOUNTER SYMPTOMS
CHILLS: 0
FEVER: 0
ROS SKIN COMMENTS: CHRONIC WOUNDS

## 2022-06-28 NOTE — PATIENT INSTRUCTIONS
-Keep dressings clean and dry. Change dressings if they become over saturated, soiled or fall off. Otherwise, your home health nurse will change your dressings between wound clinic visits.    -Should you experience any significant changes in your wound, such as signs of infection (increasing redness, swelling, localized heat, increased pain, fever > 101 F, chills) or have any questions regarding your home care instructions, please contact the wound center at [phone]. If after hours, contact your primary care physician or go to the hospital emergency room.     -If you are 5 or more minutes late for an appointment, we reserve the right to cancel and reschedule that appointment. Additionally, if you are habitually late or not showing (3 late cancellations and/or no shows), we reserve the right to cancel your remaining appointments and it will be your responsibility to obtain a new referral if services are still needed.

## 2022-06-28 NOTE — PROGRESS NOTES
Wound care order faxed to [facility] at fax #[phone].    Called [facility] and spoke with Tessy, verified that patient is seen by Home Health Mondays and Fridays.

## 2022-06-28 NOTE — PROGRESS NOTES
Provider Encounter- Full Thickness wound    HISTORY OF PRESENT ILLNESS  Wound History:   START OF CARE IN CLINIC: 4/19/2022 (return to clinic)   REFERRING PROVIDER: Juan C Worthington    WOUND- Full Thickness Wound   LOCATION: Left medial lower extremity / posterior knee   HISTORY:  Patient is a 65-year-old male with a past medical history of alcohol dependence, tobacco dependence, psychiatric disorder, and HTN who presented to the emergency department on 8/7/2020 with a left lower extremity wound infection. He was evaluated by orthopedic surgery who recommended ongoing wound care. Wound cultures at that time grew MSSA and Streptococcus. He had been seen at [facility] earlier and was prescribed antibiotics, but never filled the prescription.  An ultrasound of that extremity was done and was negative for DVT.  He was treated for sepsis secondary to cellulitis and completed an antibiotic course on 9/16/2020.  He underwent multiple bedside debridements and aggressive wound care.  Over admission he was also found to have head lice and underwent treatment for that. A repeat wound culture was done on 9/28/2020 and grew Strep A and Proteus. Infectious disease was consulted and recommended a 5-day course of IV zosyn which was completed on 10/5/2020. On 10/12/2020 the wound care team noticed increased inflammation to the proximal part of the wound bed and switched from a vera flow wound VAC to a regular wound VAC.  ID was again consulted and on 10/14/2020 recommended a 7-day course of antibiotics with meropenem which was completed on 10/22/2020.  On 11/19/2020 he underwent left lower extremity debridement with wound VAC placement.  He continues to undergo wound care and has wound VAC in place.  His wound appears to be healing appropriately.  He will require ongoing wound care outside the hospital. Additionally, he was noted to have intermittent agitation secondary to underlying psychiatric disorder and was  started on risperdal, depakote, and gabapentin per psychiatry recommendations.  He has remained stable but has been deemed incapacitated to make medical decisions so guardianship was pursued and granted on 1/29/21.  He was deemed  appropriate for group home placement with home health ordered for wound care and wound VAC management.  He was discharged to group home on 5/3/2021 home health coming by and doing wound VAC changes 2 times per week.    Patient was following with [facility], receiving active treatment for left lower extremity wounds. He was last seen in clinic on 8/20/2021 and was lost to follow up. He is currently living in group home and home health has been managing wounds.    Pertinent Medical History: Alcohol dependence (in remission), tobacco dependence, psychiatric disorder, cognitive disorder.    TOBACCO USE:  Former smoker    Patient's problem list, allergies, and current medications reviewed and updated in Epic    Interval History:  4/19/2022: Initial Clinic visit with Baldomero Kasper MD. Patient returns to clinic following prolonged absence. Patient unable to provide meaningful history. He denies any complaints of pain, fevers, or chills. Patient is currently resident at group home and wounds have been managed by home health. Wounds appear chronic and largely unchanged from previous assessment. Wounds with some slough and no evidence of infection on this visit.    4/26/2022: Clinic visit with Baldomero Kasper MD. Patient reports feeling fine. He denies any symptoms of infection. Patient does not remember if home health is changing dressings. Nursing to make sure he has home health with [facility]. Wounds slightly smaller, wound healing complicated by location with frequent movement and periwound scar tissue.    5/13/2022 : Clinic visit with RAFITA Rueda, FNP-BC, CWOCN, CFCN.  Patient returns to clinic today after 3-week absence.  He was a no-show for his last appointment.  He did not know why he  "missed his last appointment.  States he is feeling well overall.  He does express some concern about his toenails, which are severely overgrown and mycotic.  He agrees to allow for trimming and filing of these in clinic today.    5/27/2022 : Clinic visit with RAFITA Rueda, RUPESH, BARRY, PATRICIA.  Bola states he is feeling well, denies fevers, chills, nausea, vomiting, cough or shortness of breath..  Last seen in the clinic 2 weeks ago.    6/2/2022: Clinic visit with RAFITA Rdz.  Patient's wound continues to progress slowly.  No need for excisional debridement clinic today.  Overall patient feels well he states he is bored with the lack of activities patient lives in a group home.    6/21/2022: Clinic visit with RAIFTA Rdz.  Patient reports that overall he feels well denies any fever or chills.  Patient's wound is slowly progressing however, progression is hampered by the location of the wound with constant flexion and extension.  Home health orders faxed to [facility].    6/28/2022 : Clinic visit with RAFITA Rueda, RUPESH, BARRY, PATRICIA.  Bola presents today without a dressing on his wound.  He states he does not know what happened to it.  He reports that he is feeling, \"okay\".  States that he is still smoking occasionally.      REVIEW OF SYSTEMS:   Review of Systems   Unable to perform ROS: Dementia   Constitutional: Negative for chills, fever and malaise/fatigue.   Skin:        Chronic wounds       PHYSICAL EXAMINATION:   /87 (BP Location: Right arm, Patient Position: Sitting)   Pulse 74   Temp 36.9 °C (98.5 °F)   Resp 18   SpO2 93%     Physical Exam  Constitutional:       General: He is not in acute distress.  Cardiovascular:      Rate and Rhythm: Normal rate.      Comments: Palpable DP and PT pulses  Pulmonary:      Effort: Pulmonary effort is normal. No respiratory distress.   Musculoskeletal:      Right lower leg: No edema.      Left lower leg: Edema present.   Skin:   "   General: Skin is warm.      Comments: Chronic full thickness wounds left posterior knee    See flowsheet for details   Neurological:      General: No focal deficit present.      Mental Status: He is alert. Mental status is at baseline.      Comments: Oriented to person         WOUND ASSESSMENT  Wound 04/19/22 Full Thickness Wound Leg Left --Left Posterolateral Lower Leg (Popliteal Fossa) (Active)   Wound Image    06/28/22 1025   Site Assessment Dry;Red 06/28/22 1025   Periwound Assessment Dry;Scar tissue 06/28/22 1025   Margins Attached edges 06/28/22 1025   Closure Secondary intention 04/26/22 1400   Drainage Amount RONNI 06/28/22 1025   Drainage Description RONNI 06/28/22 1025   Treatments Cleansed;Topical Lidocaine;Provider debridement 06/28/22 1025   Wound Cleansing Normal Saline Irrigation 06/28/22 1025   Periwound Protectant Skin Protectant Wipes to Periwound;Skin Moisturizer 06/28/22 1025   Dressing Cleansing/Solutions Not Applicable 06/28/22 1025   Dressing Options Hydrofera Blue Ready;Hypafix Tape 06/28/22 1025   Dressing Changed Changed 06/28/22 1025   Dressing Status Clean;Dry;Intact 04/26/22 1400   Dressing Change/Treatment Frequency Every 72 hrs, and As Needed 04/26/22 1400   Non-staged Wound Description Full thickness 06/28/22 1025   Wound Length (cm) 0.8 cm 06/28/22 1025   Wound Width (cm) 1.9 cm 06/28/22 1025   Wound Depth (cm) 0.2 cm 06/28/22 1025   Wound Surface Area (cm^2) 1.52 cm^2 06/28/22 1025   Wound Volume (cm^3) 0.304 cm^3 06/28/22 1025   Post-Procedure Length (cm) 1 cm 06/28/22 1025   Post-Procedure Width (cm) 2 cm 06/28/22 1025   Post-Procedure Depth (cm) 0.2 cm 06/28/22 1025   Post-Procedure Surface Area (cm^2) 2 cm^2 06/28/22 1025   Post-Procedure Volume (cm^3) 0.4 cm^3 06/28/22 1025   Wound Healing % -769 06/28/22 1025   Tunneling (cm) 0 cm 06/28/22 1025   Undermining (cm) 0 cm 06/28/22 1025   Wound Odor None 06/28/22 1025   Exposed Structures None 06/28/22 1025   Number of days: 70           PROCEDURE:   -2% viscous lidocaine applied topically to wound bed for approximately 5 minutes prior to debridement  -Curette used to debride wound bed.  Excisional debridement was performed to remove devitalized tissue until healthy, bleeding tissue was visualized.   Entire surface of wound, 2.0 cm2 debrided.  Tissue debrided into the subcutaneous layer.    -Bleeding controlled with manual pressure.    -Wound care completed by wound RN, refer to flowsheet  -Patient tolerated the procedure well, without c/o pain or discomfort.       Pertinent Labs and Diagnostics:    Labs:     A1c:   Lab Results   Component Value Date/Time    HBA1C 5.4 01/23/2020 04:37 PM    HBA1C 5.7 (H) 11/09/2018 06:30 PM          IMAGING: None    VASCULAR STUDIES: 9/1/2020   Vascular Laboratory   Conclusions   1.  Normal bilateral KOSTAS's.    LAST  WOUND CULTURE:  DATE :   Lab Results   Component Value Date/Time    CULTRSULT - (A) 02/07/2022 02:51 PM    CULTRSULT (A) 02/07/2022 02:51 PM     Methicillin Resistant Staphylococcus aureus  Moderate growth           ASSESSMENT AND PLAN:     1. Wound of left lower extremity, subsequent encounter  Comments: Chronic wounds previously treated at [facility], last seen 2021. Has been receiving treatment with home health. Establishing in clinic 4/19/2022 06/28/22: Wound area has not changed significantly since last assessment.  Healing undoubtedly compromised by location of wound over area of constant flexion extension, and also due to patient noncompliance with treatment plan.  -Excisional debridement performed in clinic today, medically necessary to promote wound healing  -There are no overt signs or symptoms of infection today  -Patient to return to the clinic weekly for assessment and debridement   -Home health to change dressing 1-2 times per week in between clinic visit     Wound Care: Sween cream to periwound scar tissue, zinc oxide to periwound area, Hydrofera Blue ready, Hypafix tape    2.  Pain    06/28/22:  -2% viscous lidocaine applied topically to wound bed for approximately 5 minutes prior to debridement  -Patient tolerated procedure today with minimal complaints of discomfort    3. Cognitive impairment  Comments: History of phychiatric disorder, appears to have cognitive decline likely due to demential.  Complicating factor, impacts patient's ability for self-care.    6/20/2022: Patient is a very poor historian.  He presents today without a dressing on his wound.  He was unable to recall what happened the dressing, or how long it had been off.          PATIENT EDUCATION  - Importance of adequate nutrition for wound healing  -Advised to go to ER for any increased redness, swelling, drainage, or odor, or if patient develops fever, chills, nausea or vomiting.         Please note that this note may have been created using voice recognition software. I have worked with technical experts from [employer] to optimize the interface.  I have made every reasonable attempt to correct obvious errors, but there may be errors of grammar and possibly content that I did not discover before finalizing the note.    N

## 2022-07-12 ENCOUNTER — OFFICE VISIT (OUTPATIENT)
Dept: WOUND CARE | Facility: MEDICAL CENTER | Age: 67
End: 2022-07-12
Payer: MEDICARE

## 2022-07-12 DIAGNOSIS — L97.929 ULCER OF LEFT LOWER EXTREMITY, UNSPECIFIED ULCER STAGE (HCC): ICD-10-CM

## 2022-07-12 NOTE — PROGRESS NOTES
Brief Wound Care Provider Note    Patient's group home called today to cancel appointment. According to our , they are arranging 3x weekly appointments with home health and will no longer be seeking care at [facility]. They will need new referral should patient need to re-establish care.

## 2022-07-20 PROBLEM — G30.1 LATE ONSET ALZHEIMER'S DEMENTIA WITHOUT BEHAVIORAL DISTURBANCE (HCC): Status: ACTIVE | Noted: 2018-08-23

## 2022-07-20 PROBLEM — F02.80 LATE ONSET ALZHEIMER'S DEMENTIA WITHOUT BEHAVIORAL DISTURBANCE (HCC): Status: ACTIVE | Noted: 2018-08-23

## 2022-07-22 ENCOUNTER — HOSPITAL ENCOUNTER (OUTPATIENT)
Facility: MEDICAL CENTER | Age: 67
End: 2022-07-22
Payer: MEDICARE

## 2022-07-22 LAB
25(OH)D3 SERPL-MCNC: 31 NG/ML (ref 30–100)
ALBUMIN SERPL BCP-MCNC: 4.1 G/DL (ref 3.2–4.9)
ALBUMIN/GLOB SERPL: 1.2 G/DL
ALP SERPL-CCNC: 65 U/L (ref 30–99)
ALT SERPL-CCNC: 15 U/L (ref 2–50)
ANION GAP SERPL CALC-SCNC: 13 MMOL/L (ref 7–16)
AST SERPL-CCNC: 18 U/L (ref 12–45)
BASOPHILS # BLD AUTO: 0.9 % (ref 0–1.8)
BASOPHILS # BLD: 0.06 K/UL (ref 0–0.12)
BILIRUB SERPL-MCNC: 0.2 MG/DL (ref 0.1–1.5)
BUN SERPL-MCNC: 14 MG/DL (ref 8–22)
CALCIUM SERPL-MCNC: 9 MG/DL (ref 8.4–10.2)
CHLORIDE SERPL-SCNC: 104 MMOL/L (ref 96–112)
CO2 SERPL-SCNC: 24 MMOL/L (ref 20–33)
CREAT SERPL-MCNC: 0.98 MG/DL (ref 0.5–1.4)
EOSINOPHIL # BLD AUTO: 0.05 K/UL (ref 0–0.51)
EOSINOPHIL NFR BLD: 0.7 % (ref 0–6.9)
ERYTHROCYTE [DISTWIDTH] IN BLOOD BY AUTOMATED COUNT: 47.2 FL (ref 35.9–50)
GFR SERPLBLD CREATININE-BSD FMLA CKD-EPI: 84 ML/MIN/1.73 M 2
GLOBULIN SER CALC-MCNC: 3.4 G/DL (ref 1.9–3.5)
GLUCOSE SERPL-MCNC: 105 MG/DL (ref 65–99)
HCT VFR BLD AUTO: 39.1 % (ref 42–52)
HGB BLD-MCNC: 12.7 G/DL (ref 14–18)
IMM GRANULOCYTES # BLD AUTO: 0.02 K/UL (ref 0–0.11)
IMM GRANULOCYTES NFR BLD AUTO: 0.3 % (ref 0–0.9)
LYMPHOCYTES # BLD AUTO: 2.01 K/UL (ref 1–4.8)
LYMPHOCYTES NFR BLD: 29.3 % (ref 22–41)
MCH RBC QN AUTO: 29.1 PG (ref 27–33)
MCHC RBC AUTO-ENTMCNC: 32.5 G/DL (ref 33.7–35.3)
MCV RBC AUTO: 89.5 FL (ref 81.4–97.8)
MONOCYTES # BLD AUTO: 0.68 K/UL (ref 0–0.85)
MONOCYTES NFR BLD AUTO: 9.9 % (ref 0–13.4)
NEUTROPHILS # BLD AUTO: 4.05 K/UL (ref 1.82–7.42)
NEUTROPHILS NFR BLD: 58.9 % (ref 44–72)
NRBC # BLD AUTO: 0 K/UL
NRBC BLD-RTO: 0 /100 WBC
PLATELET # BLD AUTO: 313 K/UL (ref 164–446)
PMV BLD AUTO: 10.2 FL (ref 9–12.9)
POTASSIUM SERPL-SCNC: 4.3 MMOL/L (ref 3.6–5.5)
PROT SERPL-MCNC: 7.5 G/DL (ref 6–8.2)
RBC # BLD AUTO: 4.37 M/UL (ref 4.7–6.1)
SODIUM SERPL-SCNC: 141 MMOL/L (ref 135–145)
TSH SERPL DL<=0.005 MIU/L-ACNC: 1.01 UIU/ML (ref 0.38–5.33)
VIT B12 SERPL-MCNC: 588 PG/ML (ref 211–911)
WBC # BLD AUTO: 6.9 K/UL (ref 4.8–10.8)

## 2022-07-22 PROCEDURE — 80053 COMPREHEN METABOLIC PANEL: CPT

## 2022-07-22 PROCEDURE — 84443 ASSAY THYROID STIM HORMONE: CPT

## 2022-07-22 PROCEDURE — 82306 VITAMIN D 25 HYDROXY: CPT | Mod: GZ

## 2022-07-22 PROCEDURE — 82607 VITAMIN B-12: CPT

## 2022-07-22 PROCEDURE — 85025 COMPLETE CBC W/AUTO DIFF WBC: CPT

## 2022-07-22 NOTE — ASSESSMENT & PLAN NOTE
counseling   Pt is a 44 yo M w PMH of PE in 2012 after hip replacement surgery, gout & hypertriglyceridemia presents to  ED after LE US found a DVT in the outpt setting. Pt states that over the last couple of weeks, he's been experiencing hip & back pain and has been seeing Ortho for further evaluation. Today, when in a visit, he c/o of his LLE being swollen which prompted his Orthopedic to order an LE US which demonstrated a DVT. Pt was then recommended to present to ED. Pt noticed his LLE swollen 2 weeks ago. Denies inciting event, recent surgeries, immobilization or trauma to area. Admits to recent travel, but states leg was swollen from before travel. FH notable for protein S deficiency in mother and various sisters. Pt does not recall how long he was on AC for his PE, but estimates 3 months, was on Lovenox, then transitioned to Coumadin. Currently denies chest pain, palpitations, SOB, dizziness or syncopal episode.     In ED, vitals WNL, HD stable. Labs WNL. CT chest Angio demonstrated pulmonary embolus in a right lower lobar segmental pulmonary artery extending into subsegmental branches. No evidence of right heart strain. Pt was given Lovenox in ED. Evaluated by PERT team in ED, BNP & Trops WNL, bedside echo did not demonstrate any RV strain. No need for ICU for intervention, recommended full dose AC.

## 2022-09-06 PROBLEM — E87.20 LACTIC ACIDOSIS: Status: RESOLVED | Noted: 2018-11-09 | Resolved: 2022-09-06

## 2022-09-06 PROBLEM — R45.1 AGITATION: Status: RESOLVED | Noted: 2018-11-14 | Resolved: 2022-09-06

## 2022-09-06 PROBLEM — E46 PROTEIN MALNUTRITION (HCC): Status: RESOLVED | Noted: 2018-11-09 | Resolved: 2022-09-06

## 2022-09-06 PROBLEM — R33.9 RETENTION OF URINE: Status: RESOLVED | Noted: 2018-08-23 | Resolved: 2022-09-06

## 2022-10-20 NOTE — ED NOTES
Patient back from xr, resting comfortably.  600ml in urinal    Continue Regimen: doxycycline monohydrate 100 mg capsule \\nTake 1 cap po BID, PC\\n\\nclobetasol 0.05 % scalp solution QHS\\nApply once daily to scalp at bedtime\\n\\nOTC 4% BPO wash Plan: Discussed oral minoxidil for genetic hair loss component. Plan to start oral minoxidil once inflammation in scalp is controlled.\\n\\n ILK injections Txt #2\\n3 ccs of ILK 3.3 injected into scalp\\nLot: [id number]\\nExp: March 2024\\nAdmin by Dr TEJEDA Render In Strict Bullet Format?: No Detail Level: Zone

## 2022-10-25 PROBLEM — B35.1 ONYCHOMYCOSIS: Status: ACTIVE | Noted: 2022-10-25

## 2022-11-04 ENCOUNTER — PATIENT MESSAGE (OUTPATIENT)
Dept: HEALTH INFORMATION MANAGEMENT | Facility: OTHER | Age: 67
End: 2022-11-04

## 2022-12-12 PROBLEM — Z12.11 COLON CANCER SCREENING: Status: ACTIVE | Noted: 2022-12-12

## 2022-12-31 NOTE — PROGRESS NOTES
Hospital Medicine Twice Weekly Progress Note    Date of Service  11/4/2020    Chief Complaint  LLE wound    Hospital Course  Mr. Varela is a 65-year-old male who presented to the emergency department with a left lower extremity wound infection. He was hospitalized at our facility in April and evaluated by orthopedic surgery who recommended wound care. Wound cultures at that time grew MSSA and Streptococcus. He had been seen at [facility] earlier that week and prescribed antibiotics, however he had not filled the prescription.  An ultrasound of that extremity was done and was negative for DVT.  He was treated for sepsis and completed an antibiotic course on 9/16/2020. He was also found to have head lice and underwent treatment for that.  A repeat wound culture was done on 9/28/2020 and grew Strep A and Proteus. Infectious disease was consulted and recommended a 5-day course of IV zosyn which was completed on 10/5/2020. On 10/12/2020 the wound care team noticed increased inflammation to the proximal part of the wound bed and switched from a vera flow wound VAC to a regular wound VAC.  ID was again consulted and on 10/14/2020 recommended to 7-day course of antibiotics with meropenem which was completed on 10/22/2020. Additionally, he was noted to have intermittent agitation so was started on risperdal, depakote, and gabapentin per psychiatric recommendations. He was deemed to be incapacitated to make medical decisions and is currently pending guardianship and placement, likely to a group home.     Interval Problem Update  S/p LLE debridement and wound vac placement on 11/20/20   Wound vac in place pain is well controlled  Patient with nausea today 'dosent feel right'    VS stable   Labs unremarkable       Consultants/Specialty  LPS  Infectious disease  Psychiatry    Code Status  Full Code    Disposition  Pending guardianship, then will likely pursue group home placement.     Review of Systems  Review of  Systems   Constitutional: Negative for fever and malaise/fatigue.   Respiratory: Negative for cough, shortness of breath and wheezing.    Cardiovascular: Negative for chest pain and leg swelling.   Gastrointestinal: Positive for nausea. Negative for abdominal pain, constipation, diarrhea and vomiting.   Genitourinary: Negative for dysuria, frequency and urgency.   Musculoskeletal:        Mild left lower extremity pain   Neurological: Negative for dizziness, sensory change, speech change, focal weakness, weakness and headaches.   Psychiatric/Behavioral: Negative for depression. The patient is not nervous/anxious and does not have insomnia.    All other systems reviewed and are negative.     Physical Exam  Temp:  [35.9 °C (96.6 °F)-36.5 °C (97.7 °F)] 35.9 °C (96.6 °F)  Pulse:  [58-98] 83  Resp:  [17] 17  BP: (109-115)/(65-80) 110/80  SpO2:  [93 %-95 %] 94 %    Physical Exam  Vitals signs and nursing note reviewed.   Constitutional:       General: He is awake. He is not in acute distress.     Appearance: Normal appearance. He is well-developed. He is not ill-appearing.   HENT:      Head: Normocephalic and atraumatic.      Mouth/Throat:      Lips: Pink.      Mouth: Mucous membranes are moist.   Eyes:      Conjunctiva/sclera: Conjunctivae normal.      Pupils: Pupils are equal, round, and reactive to light.   Neck:      Musculoskeletal: Normal range of motion and neck supple.   Cardiovascular:      Rate and Rhythm: Normal rate and regular rhythm.      Pulses: Normal pulses.      Heart sounds: Normal heart sounds.   Pulmonary:      Effort: Pulmonary effort is normal.      Breath sounds: Normal breath sounds.   Abdominal:      General: Bowel sounds are normal. There is no distension or abdominal bruit.      Palpations: Abdomen is soft.      Tenderness: There is no abdominal tenderness.   Musculoskeletal:      Right lower leg: No edema.      Left lower leg: No edema.   Skin:     General: Skin is warm and dry.              Comments: LLE wound vac in place   Neurological:      General: No focal deficit present.      Mental Status: He is alert and oriented to person, place, and time.      GCS: GCS eye subscore is 4. GCS verbal subscore is 5. GCS motor subscore is 6.   Psychiatric:         Attention and Perception: Attention and perception normal.         Mood and Affect: Mood and affect normal.         Speech: Speech normal.         Behavior: Behavior normal. Behavior is cooperative.         Thought Content: Thought content normal.         Cognition and Memory: Cognition normal. He exhibits impaired recent memory.         Judgment: Judgment normal.     Fluids    Intake/Output Summary (Last 24 hours) at 11/23/2020 1145  Last data filed at 11/23/2020 0920  Gross per 24 hour   Intake 3520 ml   Output --   Net 3520 ml     Laboratory    Imaging  IR-US GUIDED PIV   Final Result    Ultrasound-guided PERIPHERAL IV INSERTION performed by    qualified nursing staff as above.      IR-MIDLINE CATHETER INSERTION WO GUIDANCE > AGE 3   Final Result                  Ultrasound-guided midline placement performed by qualified nursing staff    as above.          IR-US GUIDED PIV   Final Result    Ultrasound-guided PERIPHERAL IV INSERTION performed by    qualified nursing staff as above.      IR-MIDLINE CATHETER INSERTION WO GUIDANCE > AGE 3   Final Result                  Ultrasound-guided midline placement performed by qualified nursing staff    as above.          US-KOSTAS SINGLE LEVEL BILAT   Final Result      CT-HEAD W/O   Final Result      No acute intracranial abnormality      DX-TIBIA AND FIBULA LEFT   Final Result      1.  Healed distal metaphyseal fractures of the left fibula and tibia with tibial IM layla in place.      2.  No acute fracture or dislocation.      3.  No radiopaque soft tissue foreign body.      DX-FEMUR-2+ LEFT   Final Result      1.  No radiographic evidence of acute traumatic injury left femur.      2.  Unchanged cortical thickening  in the posterior aspect of the distal femoral diaphysis which may represent sequela of prior injury or infection.      3.  No soft tissue foreign body identified.      US-EXTREMITY VENOUS LOWER UNILAT LEFT   Final Result      DX-CHEST-PORTABLE (1 VIEW)   Final Result      No acute cardiopulmonary abnormality.         Assessment/Plan  * Infection of wound due to methicillin resistant Staphylococcus aureus (MRSA)- (present on admission)  Assessment & Plan  -History of MRSA.  -Poor compliance with OP wound care.   -LLE wound s/p debridement and wound vac placement on 11/19/20   - ortho following  -pain control   -wound care and wound vac changes   -no evidence of acute infection hold abx for now    Encephalopathy- (present on admission)  Assessment & Plan  -Acute on chronic, likely due Wernicke's.  -Psychiatry: incapacitated to make medical decision or leave AMA   -Guardianship and placement pending.  -Avoid narcotics and hypnotics.    -Frequent reorientation  -Sleep hygiene.      Psychiatric disorder  Assessment & Plan  -Unknown/unspecified.  -Continue Risperdal and Depakote.    -Psychiatry consulted and deemed him incapacitated to leave AMA or make medical decisions  - pending guardianship      Essential hypertension- (present on admission)  Assessment & Plan  -Well-controlled on current regimen.  Continue amlodipine.    Flexion contracture of knee, 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)  Assessment & Plan  -Secondary to chronic wound in that area.  -PT/OT.  -Encourage mobility.    Nausea  Assessment & Plan  Did have aaron emild chest pain earlier today   Check troponin and ekg   Trial GI cocktail   Anti nausea medications    Emphysema/COPD (HCC)  Assessment & Plan  -Chronic and stable, without acute exacerbation.    Alcohol dependence (HCC)- (present on admission)  Assessment & Plan  -History of. Abstinent since admission.    Protein malnutrition (HCC)  Assessment & Plan  -Moderate/severe.  -Body mass index is 21.41 kg/m².  (Stable)  -Continue TID supplements.  -Encourage PO intake.    Tobacco dependence- (present on admission)  Assessment & Plan  -Abstinent since admission.     VTE prophylaxis: lovenox         31-Dec-2022 19:40

## 2023-01-30 PROBLEM — G31.9 CEREBRAL ATROPHY (HCC): Status: ACTIVE | Noted: 2023-01-30

## 2023-01-30 PROBLEM — R79.89 LFT ELEVATION: Status: RESOLVED | Noted: 2020-01-31 | Resolved: 2023-01-30

## 2023-03-15 ENCOUNTER — HOSPITAL ENCOUNTER (OUTPATIENT)
Facility: MEDICAL CENTER | Age: 68
End: 2023-03-15
Payer: MEDICARE

## 2023-03-15 DIAGNOSIS — R73.03 PRE-DIABETES: ICD-10-CM

## 2023-03-15 DIAGNOSIS — D50.8 OTHER IRON DEFICIENCY ANEMIA: ICD-10-CM

## 2023-03-15 DIAGNOSIS — Z79.899 HIGH RISK MEDICATION USE: ICD-10-CM

## 2023-03-15 PROCEDURE — 83540 ASSAY OF IRON: CPT

## 2023-03-15 PROCEDURE — 83036 HEMOGLOBIN GLYCOSYLATED A1C: CPT | Mod: GA

## 2023-03-15 PROCEDURE — 80048 BASIC METABOLIC PNL TOTAL CA: CPT

## 2023-03-15 PROCEDURE — 80061 LIPID PANEL: CPT

## 2023-03-15 PROCEDURE — 83550 IRON BINDING TEST: CPT

## 2023-03-15 PROCEDURE — 82728 ASSAY OF FERRITIN: CPT

## 2023-03-15 PROCEDURE — 85025 COMPLETE CBC W/AUTO DIFF WBC: CPT

## 2023-03-16 LAB
ANION GAP SERPL CALC-SCNC: 12 MMOL/L (ref 7–16)
BASOPHILS # BLD AUTO: 0.7 % (ref 0–1.8)
BASOPHILS # BLD: 0.04 K/UL (ref 0–0.12)
BUN SERPL-MCNC: 21 MG/DL (ref 8–22)
CALCIUM SERPL-MCNC: 9.3 MG/DL (ref 8.5–10.5)
CHLORIDE SERPL-SCNC: 104 MMOL/L (ref 96–112)
CHOLEST SERPL-MCNC: 196 MG/DL (ref 100–199)
CO2 SERPL-SCNC: 25 MMOL/L (ref 20–33)
CREAT SERPL-MCNC: 0.87 MG/DL (ref 0.5–1.4)
EOSINOPHIL # BLD AUTO: 0.04 K/UL (ref 0–0.51)
EOSINOPHIL NFR BLD: 0.7 % (ref 0–6.9)
ERYTHROCYTE [DISTWIDTH] IN BLOOD BY AUTOMATED COUNT: 46.9 FL (ref 35.9–50)
EST. AVERAGE GLUCOSE BLD GHB EST-MCNC: 114 MG/DL
FERRITIN SERPL-MCNC: 184 NG/ML (ref 22–322)
GFR SERPLBLD CREATININE-BSD FMLA CKD-EPI: 94 ML/MIN/1.73 M 2
GLUCOSE SERPL-MCNC: 110 MG/DL (ref 65–99)
HBA1C MFR BLD: 5.6 % (ref 4–5.6)
HCT VFR BLD AUTO: 42.4 % (ref 42–52)
HDLC SERPL-MCNC: 62 MG/DL
HGB BLD-MCNC: 13.4 G/DL (ref 14–18)
IMM GRANULOCYTES # BLD AUTO: 0.02 K/UL (ref 0–0.11)
IMM GRANULOCYTES NFR BLD AUTO: 0.4 % (ref 0–0.9)
IRON SATN MFR SERPL: 26 % (ref 15–55)
IRON SERPL-MCNC: 67 UG/DL (ref 50–180)
LDLC SERPL CALC-MCNC: 118 MG/DL
LYMPHOCYTES # BLD AUTO: 1.3 K/UL (ref 1–4.8)
LYMPHOCYTES NFR BLD: 23.1 % (ref 22–41)
MCH RBC QN AUTO: 30.5 PG (ref 27–33)
MCHC RBC AUTO-ENTMCNC: 31.6 G/DL (ref 33.7–35.3)
MCV RBC AUTO: 96.4 FL (ref 81.4–97.8)
MONOCYTES # BLD AUTO: 0.49 K/UL (ref 0–0.85)
MONOCYTES NFR BLD AUTO: 8.7 % (ref 0–13.4)
NEUTROPHILS # BLD AUTO: 3.73 K/UL (ref 1.82–7.42)
NEUTROPHILS NFR BLD: 66.4 % (ref 44–72)
NRBC # BLD AUTO: 0 K/UL
NRBC BLD-RTO: 0 /100 WBC
PLATELET # BLD AUTO: 273 K/UL (ref 164–446)
PMV BLD AUTO: 10.4 FL (ref 9–12.9)
POTASSIUM SERPL-SCNC: 5 MMOL/L (ref 3.6–5.5)
RBC # BLD AUTO: 4.4 M/UL (ref 4.7–6.1)
SODIUM SERPL-SCNC: 141 MMOL/L (ref 135–145)
TIBC SERPL-MCNC: 254 UG/DL (ref 250–450)
TRIGL SERPL-MCNC: 82 MG/DL (ref 0–149)
UIBC SERPL-MCNC: 187 UG/DL (ref 110–370)
WBC # BLD AUTO: 5.6 K/UL (ref 4.8–10.8)

## 2023-05-08 PROBLEM — M54.50 CHRONIC LOW BACK PAIN: Status: ACTIVE | Noted: 2021-07-22

## 2023-05-08 PROBLEM — G62.1 ALCOHOL-INDUCED POLYNEUROPATHY (HCC): Status: ACTIVE | Noted: 2023-05-03

## 2023-06-16 PROBLEM — R30.0 DYSURIA: Status: ACTIVE | Noted: 2023-06-16

## 2023-06-16 PROBLEM — R05.2 SUBACUTE COUGH: Status: ACTIVE | Noted: 2020-04-01

## 2023-06-16 PROBLEM — Z91.83 WANDERING BEHAVIOR DUE TO DEMENTIA (HCC): Status: ACTIVE | Noted: 2023-06-16

## 2023-06-16 PROBLEM — F03.918 WANDERING BEHAVIOR DUE TO DEMENTIA (HCC): Status: ACTIVE | Noted: 2023-06-16

## 2023-06-28 NOTE — CARE PLAN
Problem: Infection  Goal: Will remain free from infection  Outcome: PROGRESSING AS EXPECTED   Pt is being followed by wound team, wound vac to the LLE, working properly.   Problem: Pain Management  Goal: Pain level will decrease to patient's comfort goal  Outcome: PROGRESSING AS EXPECTED   Pain is well managed on oral medications.    None

## 2023-07-11 NOTE — PROGRESS NOTES
1:1 sitter at bedside   Kilograms Preamble Statement (Weight Entered In Details Tab): Reported Weight in kilograms:

## 2023-07-17 NOTE — WOUND TEAM
[facility]  Inpatient Services  Wound and Skin Care Progress Note    Admission Date: 8/7/2020     Last order of IP CONSULT TO WOUND CARE was found on 9/1/2020 from Hospital Encounter on 8/7/2020     HPI, PMH, SH: Reviewed    Unit where seen by Wound Team: [id number]    WOUND CONSULT/FOLLOW UP RELATED TO:  Left leg follow-up    Self Report / Pain Level: pain with debridement, topical lidocaine used    OBJECTIVE:  2 layer wrap intact, Tele Sitter in place and on.     WOUND TYPE, LOCATION, CHARACTERISTICS (Pressure Injuries: location, stage, POA or date identified)     Negative Pressure Wound Therapy 11/20/20 Leg Lateral;Posterior;Medial Left (Active)   NPWT Pump Mode / Pressure Setting Ulta;Continuous;125 mmHg 11/23/20 1200   Dressing Type Medium;Black Foam (Regular) 11/23/20 1200   Number of Foam Pieces Used 4 11/23/20 1200   Canister Changed No 11/23/20 1200   NEXT Dressing Change/Treatment Date 11/25/20 11/23/20 1200   WOUND NURSE ONLY - Time Spent with Patient (mins) 75 11/23/20 1200     Wound 11/19/20 Full Thickness Wound Leg Lateral;Posterior;Medial Left BIOLOGIC IN PLACE under adaptic DO NOT TOUCH ADAPTIC (Active)   Wound Image    11/23/20 1200   Site Assessment Red;White;Yellow 11/23/20 1200   Periwound Assessment Maceration;Denuded;Clean;Dry;Intact 11/23/20 1200   Margins Defined edges;Attached edges 11/23/20 1200   Closure Secondary intention 11/23/20 1200   Drainage Amount Copious 11/23/20 1200   Drainage Description Serosanguineous 11/23/20 1200   Treatments Site care 11/23/20 1200   Wound Cleansing Not Applicable 11/23/20 1200   Periwound Protectant Benzoin;Paste Ring;Drape 11/23/20 1200   Dressing Cleansing/Solutions Not Applicable 11/23/20 1200   Dressing Options Wound Vac;Compression Wrap Two Layer 11/23/20 1200   Dressing Changed Changed 11/23/20 1200   Dressing Status Clean;Dry;Intact 11/23/20 1200   Dressing Change/Treatment Frequency By Wound Team Only 11/23/20 1200   NEXT Dressing  Change/Treatment Date 20 1200   NEXT Weekly Photo (Inpatient Only) 20 1200   Shape Irregular 20 1200   Pulses Left;2+;DP;PT 20 1200   Exposed Structures None 20 1200   WOUND NURSE ONLY - Time Spent with Patient (mins) 75 20 1200       VASCULAR    CESAR:   CESAR Results, Last 30 Days Us-cesar Single Level Bilat    Result Date: 2020  Narrative  Vascular Laboratory  Conclusions  1.  Normal bilateral CESAR's.  GRUPO GANN  Age:    64    Gender:     M  MRN:    [id number]  :    1955      BSA:  Exam Date:     2020 09:59  Room #:     Inpatient  Priority:     Routine  Ht (in):             Wt (lb):  Ordering Physician:        EDDA CANADA  Referring Physician:       [id number] Morrissey  Sonographer:               Deja Ellis RDMS                             RVT  Study Type:                Complete Bilateral  Technical Quality:         Adequate  Indications:     Ulceration of LE  CPT Codes:       87894  ICD Codes:       707.1  History:         Nonhealing wound of left lower extremity.  Limitations:                 RIGHT  Waveform            Systolic BPs (mmHg)                             117           Brachial  Triphasic                                Common Femoral  Triphasic                  138           Posterior Tibial  Triphasic                  132           Dorsalis Pedis                                           Peroneal                             1.18          CESAR                                           TBI                       LEFT  Waveform        Systolic BPs (mmHg)                             114           Brachial  Triphasic                                Common Femoral  Triphasic                  127           Posterior Tibial  Triphasic                  122           Dorsalis Pedis                                           Peroneal                             1.09           KOSTAS                                           TBI  Findings  Right.  Doppler waveforms of the common femoral artery are of high amplitude and  triphasic.  Doppler waveforms at the ankle are brisk and triphasic.  Ankle-brachial index is normal.  Left.  Doppler waveforms of the common femoral artery are of high amplitude and  triphasic.  Doppler waveforms at the ankle are brisk and triphasic.  Ankle-brachial index is normal.  Unable to obtained popliteal waveforms due to wound bandages.  Additional testing was not performed in accordance with lower extremity  arterial evaluation protocol.  Clover Maya  (Electronically Signed)  Final Date:      02 September 2020                   11:14    Lab Values:    Lab Results   Component Value Date/Time    WBC 6.5 11/22/2020 02:44 AM    RBC 3.54 (L) 11/22/2020 02:44 AM    HEMOGLOBIN 9.9 (L) 11/22/2020 02:44 AM    HEMATOCRIT 30.5 (L) 11/22/2020 02:44 AM    CREACTPROT 1.07 (H) 08/12/2020 01:55 PM    SEDRATEWES 85 (H) 08/07/2020 01:20 PM    HBA1C 5.7 (H) 11/09/2018 06:30 PM        Culture Results show:  Recent Results (from the past 720 hour(s))   CULTURE WOUND W/ GRAM STAIN    Collection Time: 09/01/20  2:00 PM    Specimen: Left Leg; Wound   Result Value Ref Range    Significant Indicator POS (POS)     Source WND     Site LEFT LEG     Culture Result - (A)     Gram Stain Result       Moderate Gram positive cocci.  Moderate Gram positive rods.      Culture Result (A)      Beta Hemolytic Streptococcus group A  Moderate growth      Culture Result (A)      Methicillin Resistant Staphylococcus aureus  Moderate growth      Culture Result (A)      Diphtheroids  Moderate growth  Mixed morphologies.         INTERVENTIONS BY WOUND TEAM:    Dressings removed and addison-wound cleansed with wound cleanser and patted dry.  Adaptic was lifted and measurement and pictures taken.  Adaptic secured with steri-strips.  Addison-wound skin protected with benzoin, drape and paste ring at the distal end  of the medial aspect of woundbed.  4 pieces of black foam to the wound bed, secured with drape.  TRAC pad placed.  NPWT resumed, no leaks noted.   2 layer compression wrap to the left leg to the mid-thigh.        Interdisciplinary consultation: Patient, Bedside RN, Asaf ELLER [facility]      EVALUATION / RATIONALE FOR TREATMENT:    11/23: POD#4 s/p I&D of left LE wounds and biologic placed by Dr. Olvera on 11/19.  Wound bed is flatter and raised edges scar tissue seems to be gone.   11/14 Posterior thigh aspect of wound deteriorating, will increase frequency of treatment to keep biofilm down and hopefully avoid systemic abx.  Will include thigh in compression wrap.  Will consider culture if improvement is not seen in the next few treatments.    11/10: APRN unavailable at this time.  Will re-schedule excisional debridement to next week.   11/5: Plan for debridement of scarred edges on Tues by LPS. Tendon exposed to posterior aspect of wound, behind knee. Continue with current dressing care.  10/29: Excisional debridement by Asaf ELLER of scar tissue along the proximal edge of the posterior knee and lateral thigh.  Lateral aspect of thigh is flatten.  Medial aspect of knee has thick scar tissue that was excisionally debrided by Asaf ELLER.  Fully granulated throughout the wound bed.   10/23 wound bed mostly granular, superficial in depth, progress has been slow with the wound VAC so will trial collagen product to encourage new tissue growth.    10/19: wound bed has improved in color and is fully granulated.  Meredith-wound has improved, denudation has resolved. APRN continuing with serial weekly debridements as needed.   10/16: wound friable pt now on iv abx per ID.  Indurated edges addressed with drape and foam.  Meredith wound likely has yeast infection - addressing with silver powder crusting  10/14: patient seen with Asaf ELLER, stopping veraflo instillation.  Starting silver powder and regular  wound VAC to see if it will help with bioburden.  Possible colonization of bacteria.  ID involved.   10/12: Inflammation noted to the proximal part of the wound bed.  Will continue to monitor, possible vac break on Wednesday to help addison-skin inflammation.   10/7: Excisional debridement performed by Asaf ELLER. Will need to continue selective debridement with NPWT changes, and weekly excisional debridement with APRN to flatten out the scar tissue.  IV abx therapy ended 10/5.   10/5: Lateral wound still with some slough, both wounds smaller per measurements. Medial wound with all granular tissue.  9/30: Patient to start abx therapy for 7 days course per Dr. Ellis.  Started dakin's instillation to veraflo  9/28: malodorous today, culture taken.    9/25: Odor noted, though unclear if from wound or pt, consider shower before next Vac change. Consider regular vac next change, wound granular and superficial.   9/23: Patient still awaiting guardianship for placement.   9/18: wound granulating nicely and responding well to current treatment.    9/16: Wound bed with granular tissue, edges are thick but appear better than previous assessments. . NPWT VF to encourage closure by secondary intention and manage drainage while encouraging oxygenation and granulation to the wound bed. 2 layer compression to assist in decrease of swelling and encourage blood flow to wounds. Wound team to follow up and change 3x/week.      Goals: Steady decrease in wound area and depth weekly.    NURSING PLAN OF CARE ORDERS (X):    Dressing changes: See Dressing Care orders: X  Skin care: See Skin Care orders:   Rectal tube care: See Rectal Tube Care orders:   Other orders:      WOUND TEAM PLAN OF CARE:   Dressing changes by wound team:        Follow up 3 times weekly:                NPWT change 3 times weekly:  X,  NPWT changes 3x/ weekly with 2 layer compression wrap.   Follow up 1-2 times weekly:      Follow up Bi-Monthly:                    Follow up as needed:       Other (explain):     Anticipated discharge plans:  LTACH:        SNF/Rehab: X - patient will need ongoing wound care for LLE upon discharge - 3x/weekly           Home Health Care:           Outpatient Wound Center:            Self Care:               (4) walks frequently

## 2023-07-30 PROBLEM — F10.27: Status: ACTIVE | Noted: 2023-01-01

## 2023-07-30 PROBLEM — F05 SUNDOWN SYNDROME: Status: ACTIVE | Noted: 2021-07-22

## 2023-07-30 PROBLEM — I10 ESSENTIAL HYPERTENSION: Status: RESOLVED | Noted: 2018-11-13 | Resolved: 2023-07-30

## 2023-07-30 PROBLEM — F10.97: Status: ACTIVE | Noted: 2023-07-30

## 2023-11-03 ENCOUNTER — APPOINTMENT (OUTPATIENT)
Dept: RADIOLOGY | Facility: MEDICAL CENTER | Age: 68
DRG: 689 | End: 2023-11-03
Attending: EMERGENCY MEDICINE
Payer: MEDICARE

## 2023-11-03 ENCOUNTER — HOSPITAL ENCOUNTER (INPATIENT)
Facility: MEDICAL CENTER | Age: 68
LOS: 2 days | DRG: 689 | End: 2023-11-05
Attending: EMERGENCY MEDICINE | Admitting: FAMILY MEDICINE
Payer: MEDICARE

## 2023-11-03 DIAGNOSIS — R00.1 BRADYCARDIA: ICD-10-CM

## 2023-11-03 PROBLEM — R06.02 SHORTNESS OF BREATH: Status: ACTIVE | Noted: 2023-11-03

## 2023-11-03 PROBLEM — R09.89 PULMONARY VASCULAR CONGESTION: Status: ACTIVE | Noted: 2023-11-03

## 2023-11-03 PROBLEM — Z71.89 ADVANCED CARE PLANNING/COUNSELING DISCUSSION: Status: ACTIVE | Noted: 2023-11-03

## 2023-11-03 LAB
ALBUMIN SERPL BCP-MCNC: 4.3 G/DL (ref 3.2–4.9)
ALBUMIN/GLOB SERPL: 1.3 G/DL
ALP SERPL-CCNC: 80 U/L (ref 30–99)
ALT SERPL-CCNC: 27 U/L (ref 2–50)
ANION GAP SERPL CALC-SCNC: 14 MMOL/L (ref 7–16)
APPEARANCE UR: CLEAR
AST SERPL-CCNC: 22 U/L (ref 12–45)
BACTERIA #/AREA URNS HPF: NEGATIVE /HPF
BASOPHILS # BLD AUTO: 0.5 % (ref 0–1.8)
BASOPHILS # BLD: 0.03 K/UL (ref 0–0.12)
BILIRUB SERPL-MCNC: 0.4 MG/DL (ref 0.1–1.5)
BILIRUB UR QL STRIP.AUTO: NEGATIVE
BUN SERPL-MCNC: 15 MG/DL (ref 8–22)
CALCIUM ALBUM COR SERPL-MCNC: 9.5 MG/DL (ref 8.5–10.5)
CALCIUM SERPL-MCNC: 9.7 MG/DL (ref 8.5–10.5)
CHLORIDE SERPL-SCNC: 100 MMOL/L (ref 96–112)
CO2 SERPL-SCNC: 26 MMOL/L (ref 20–33)
COLOR UR: YELLOW
CREAT SERPL-MCNC: 0.71 MG/DL (ref 0.5–1.4)
EKG IMPRESSION: NORMAL
EOSINOPHIL # BLD AUTO: 0.03 K/UL (ref 0–0.51)
EOSINOPHIL NFR BLD: 0.5 % (ref 0–6.9)
EPI CELLS #/AREA URNS HPF: NEGATIVE /HPF
ERYTHROCYTE [DISTWIDTH] IN BLOOD BY AUTOMATED COUNT: 46.5 FL (ref 35.9–50)
GFR SERPLBLD CREATININE-BSD FMLA CKD-EPI: 99 ML/MIN/1.73 M 2
GLOBULIN SER CALC-MCNC: 3.4 G/DL (ref 1.9–3.5)
GLUCOSE SERPL-MCNC: 93 MG/DL (ref 65–99)
GLUCOSE UR STRIP.AUTO-MCNC: NEGATIVE MG/DL
HCT VFR BLD AUTO: 41.5 % (ref 42–52)
HGB BLD-MCNC: 13.3 G/DL (ref 14–18)
HYALINE CASTS #/AREA URNS LPF: ABNORMAL /LPF
IMM GRANULOCYTES # BLD AUTO: 0.03 K/UL (ref 0–0.11)
IMM GRANULOCYTES NFR BLD AUTO: 0.5 % (ref 0–0.9)
KETONES UR STRIP.AUTO-MCNC: NEGATIVE MG/DL
LACTATE SERPL-SCNC: 1.7 MMOL/L (ref 0.5–2)
LEUKOCYTE ESTERASE UR QL STRIP.AUTO: ABNORMAL
LYMPHOCYTES # BLD AUTO: 1.38 K/UL (ref 1–4.8)
LYMPHOCYTES NFR BLD: 23.2 % (ref 22–41)
MAGNESIUM SERPL-MCNC: 1.8 MG/DL (ref 1.5–2.5)
MCH RBC QN AUTO: 29.8 PG (ref 27–33)
MCHC RBC AUTO-ENTMCNC: 32 G/DL (ref 32.3–36.5)
MCV RBC AUTO: 92.8 FL (ref 81.4–97.8)
MICRO URNS: ABNORMAL
MONOCYTES # BLD AUTO: 0.52 K/UL (ref 0–0.85)
MONOCYTES NFR BLD AUTO: 8.8 % (ref 0–13.4)
NEUTROPHILS # BLD AUTO: 3.95 K/UL (ref 1.82–7.42)
NEUTROPHILS NFR BLD: 66.5 % (ref 44–72)
NITRITE UR QL STRIP.AUTO: NEGATIVE
NRBC # BLD AUTO: 0 K/UL
NRBC BLD-RTO: 0 /100 WBC (ref 0–0.2)
NT-PROBNP SERPL IA-MCNC: 129 PG/ML (ref 0–125)
PH UR STRIP.AUTO: 7 [PH] (ref 5–8)
PLATELET # BLD AUTO: 272 K/UL (ref 164–446)
PMV BLD AUTO: 10 FL (ref 9–12.9)
POTASSIUM SERPL-SCNC: 3.7 MMOL/L (ref 3.6–5.5)
PROT SERPL-MCNC: 7.7 G/DL (ref 6–8.2)
PROT UR QL STRIP: NEGATIVE MG/DL
RBC # BLD AUTO: 4.47 M/UL (ref 4.7–6.1)
RBC # URNS HPF: ABNORMAL /HPF
RBC UR QL AUTO: ABNORMAL
SODIUM SERPL-SCNC: 140 MMOL/L (ref 135–145)
SP GR UR STRIP.AUTO: 1.01
TROPONIN T SERPL-MCNC: 12 NG/L (ref 6–19)
UROBILINOGEN UR STRIP.AUTO-MCNC: 0.2 MG/DL
WBC # BLD AUTO: 5.9 K/UL (ref 4.8–10.8)
WBC #/AREA URNS HPF: ABNORMAL /HPF

## 2023-11-03 PROCEDURE — A9270 NON-COVERED ITEM OR SERVICE: HCPCS

## 2023-11-03 PROCEDURE — 81001 URINALYSIS AUTO W/SCOPE: CPT

## 2023-11-03 PROCEDURE — 83735 ASSAY OF MAGNESIUM: CPT

## 2023-11-03 PROCEDURE — 93005 ELECTROCARDIOGRAM TRACING: CPT

## 2023-11-03 PROCEDURE — 87186 SC STD MICRODIL/AGAR DIL: CPT

## 2023-11-03 PROCEDURE — 700102 HCHG RX REV CODE 250 W/ 637 OVERRIDE(OP)

## 2023-11-03 PROCEDURE — 87077 CULTURE AEROBIC IDENTIFY: CPT

## 2023-11-03 PROCEDURE — 36415 COLL VENOUS BLD VENIPUNCTURE: CPT

## 2023-11-03 PROCEDURE — 87086 URINE CULTURE/COLONY COUNT: CPT

## 2023-11-03 PROCEDURE — 83605 ASSAY OF LACTIC ACID: CPT

## 2023-11-03 PROCEDURE — 80053 COMPREHEN METABOLIC PANEL: CPT

## 2023-11-03 PROCEDURE — 71045 X-RAY EXAM CHEST 1 VIEW: CPT

## 2023-11-03 PROCEDURE — 84484 ASSAY OF TROPONIN QUANT: CPT

## 2023-11-03 PROCEDURE — 83880 ASSAY OF NATRIURETIC PEPTIDE: CPT

## 2023-11-03 PROCEDURE — 85025 COMPLETE CBC W/AUTO DIFF WBC: CPT

## 2023-11-03 PROCEDURE — 93005 ELECTROCARDIOGRAM TRACING: CPT | Performed by: EMERGENCY MEDICINE

## 2023-11-03 PROCEDURE — 770020 HCHG ROOM/CARE - TELE (206)

## 2023-11-03 PROCEDURE — 99285 EMERGENCY DEPT VISIT HI MDM: CPT

## 2023-11-03 RX ORDER — BISACODYL 10 MG
10 SUPPOSITORY, RECTAL RECTAL
Status: DISCONTINUED | OUTPATIENT
Start: 2023-11-03 | End: 2023-11-05 | Stop reason: HOSPADM

## 2023-11-03 RX ORDER — MONTELUKAST SODIUM 10 MG/1
10 TABLET ORAL DAILY
Status: DISCONTINUED | OUTPATIENT
Start: 2023-11-04 | End: 2023-11-05 | Stop reason: HOSPADM

## 2023-11-03 RX ORDER — AMOXICILLIN 250 MG
2 CAPSULE ORAL 2 TIMES DAILY
Status: DISCONTINUED | OUTPATIENT
Start: 2023-11-03 | End: 2023-11-03

## 2023-11-03 RX ORDER — HYDROCODONE BITARTRATE AND ACETAMINOPHEN 5; 325 MG/1; MG/1
0.5 TABLET ORAL EVERY 6 HOURS PRN
Status: DISCONTINUED | OUTPATIENT
Start: 2023-11-03 | End: 2023-11-05 | Stop reason: HOSPADM

## 2023-11-03 RX ORDER — ACETAMINOPHEN 325 MG/1
650 TABLET ORAL EVERY 6 HOURS PRN
Status: DISCONTINUED | OUTPATIENT
Start: 2023-11-03 | End: 2023-11-05 | Stop reason: HOSPADM

## 2023-11-03 RX ORDER — AMOXICILLIN 250 MG
2 CAPSULE ORAL 2 TIMES DAILY
Status: DISCONTINUED | OUTPATIENT
Start: 2023-11-03 | End: 2023-11-05 | Stop reason: HOSPADM

## 2023-11-03 RX ORDER — POLYETHYLENE GLYCOL 3350 17 G/17G
1 POWDER, FOR SOLUTION ORAL
Status: DISCONTINUED | OUTPATIENT
Start: 2023-11-03 | End: 2023-11-05 | Stop reason: HOSPADM

## 2023-11-03 RX ORDER — TRAZODONE HYDROCHLORIDE 50 MG/1
50 TABLET ORAL
Status: DISCONTINUED | OUTPATIENT
Start: 2023-11-03 | End: 2023-11-05 | Stop reason: HOSPADM

## 2023-11-03 RX ORDER — IPRATROPIUM BROMIDE 42 UG/1
2 SPRAY, METERED NASAL EVERY 4 HOURS PRN
Status: DISCONTINUED | OUTPATIENT
Start: 2023-11-03 | End: 2023-11-05 | Stop reason: HOSPADM

## 2023-11-03 RX ORDER — DIVALPROEX SODIUM 250 MG/1
250 TABLET, DELAYED RELEASE ORAL
Status: DISCONTINUED | OUTPATIENT
Start: 2023-11-03 | End: 2023-11-03

## 2023-11-03 RX ORDER — RISPERIDONE 1 MG/1
1 TABLET ORAL 2 TIMES DAILY
Status: DISCONTINUED | OUTPATIENT
Start: 2023-11-03 | End: 2023-11-05 | Stop reason: HOSPADM

## 2023-11-03 RX ORDER — DIVALPROEX SODIUM 250 MG/1
250 TABLET, DELAYED RELEASE ORAL 2 TIMES DAILY
Status: DISCONTINUED | OUTPATIENT
Start: 2023-11-03 | End: 2023-11-05 | Stop reason: HOSPADM

## 2023-11-03 RX ORDER — GABAPENTIN 300 MG/1
300 CAPSULE ORAL 3 TIMES DAILY
Status: DISCONTINUED | OUTPATIENT
Start: 2023-11-03 | End: 2023-11-05 | Stop reason: HOSPADM

## 2023-11-03 RX ORDER — ENOXAPARIN SODIUM 100 MG/ML
40 INJECTION SUBCUTANEOUS DAILY
Status: DISCONTINUED | OUTPATIENT
Start: 2023-11-04 | End: 2023-11-05 | Stop reason: HOSPADM

## 2023-11-03 RX ADMIN — DIVALPROEX SODIUM 250 MG: 250 TABLET, DELAYED RELEASE ORAL at 21:38

## 2023-11-03 RX ADMIN — TRAZODONE HYDROCHLORIDE 50 MG: 50 TABLET ORAL at 21:17

## 2023-11-03 RX ADMIN — RISPERIDONE 1 MG: 1 TABLET ORAL at 21:16

## 2023-11-03 RX ADMIN — GABAPENTIN 300 MG: 300 CAPSULE ORAL at 21:16

## 2023-11-03 ASSESSMENT — COGNITIVE AND FUNCTIONAL STATUS - GENERAL
DAILY ACTIVITIY SCORE: 24
MOVING TO AND FROM BED TO CHAIR: A LITTLE
SUGGESTED CMS G CODE MODIFIER MOBILITY: CK
MOBILITY SCORE: 19
STANDING UP FROM CHAIR USING ARMS: A LITTLE
SUGGESTED CMS G CODE MODIFIER DAILY ACTIVITY: CH
CLIMB 3 TO 5 STEPS WITH RAILING: A LITTLE
MOVING FROM LYING ON BACK TO SITTING ON SIDE OF FLAT BED: A LITTLE
WALKING IN HOSPITAL ROOM: A LITTLE

## 2023-11-03 ASSESSMENT — FIBROSIS 4 INDEX
FIB4 SCORE: 1.17
FIB4 SCORE: 1.07

## 2023-11-03 NOTE — ED NOTES
Late entry> Pt has had several outbursts of yelling out and trying to get out of bed. Bed alarm in place but pt requires constant reorientation and explanation of POC. Sitter requested and now remains outside of room. Pt also found hitting himself in the head at times and needs reminding to stop behaviors of self harm. Pt redirectable with sitter in place.

## 2023-11-03 NOTE — ED TRIAGE NOTES
Pt bib ems from care home.  Chief Complaint   Patient presents with    Sent by MD     For bradycardia      Pt had a monthly check up today and APRN found the pt to be bradycardic. Pt denies symptoms but AOx1 to person only. Staff told EMS pt was baseline neuro status and has not had any complaints at home.    Pt connected to monitor. Chart up for ERP.

## 2023-11-03 NOTE — ED PROVIDER NOTES
ER Provider Note    Scribed for Yanique Delgado M.d. by Steven Reno. 11/3/2023  2:08 PM    Primary Care Provider: FELIPE Flores    CHIEF COMPLAINT  Chief Complaint   Patient presents with    Sent by MD     For bradycardia      LIMITATION TO HISTORY   Select: History of dementia/ poor historian    HPI/ROS  OUTSIDE HISTORIAN(S):  None    EXTERNAL RECORDS REVIEWED  Outpatient Notes Patient was se sent by pcp aprn for bradycardia. Seen by geriatric speciality care aprn. Hx of dementia, chronic cough,. Found hr to be 44 so prompted to ed. 09/`15/2023 hr was in 70s when aprn saw him. In 60's in 07/2023 for hr and in June 2023.    Bola Varela is a 68 y.o. male with a history of dementia who presents to the ED for bradycardia onset today. The patient was prompted to come to the ED by his PCP for a heart rate of 44 today. The patient reports that he is not sick and denies any recent illnesses. Patient has a note on his bed that reads he is in [place] and at the hospital to remind him where he is. Patient reports that he does not have a home but he lives at a group home. He denies chest pain, back pain, dizziness, headache, nausea, or weakness. He is unsure what year it is.  No alleviating or exacerbating factors noted. Patient has a history of Hypertension.    PAST MEDICAL HISTORY  Past Medical History:   Diagnosis Date    Essential hypertension 11/13/2018    Psychiatric problem     Restless leg     Tobacco use        SURGICAL HISTORY  Past Surgical History:   Procedure Laterality Date    IRRIGATION & DEBRIDEMENT ORTHO Left 11/19/2020    Procedure: IRRIGATION AND DEBRIDEMENT, WOUND - LEG WITH WOUND VAC AND POSSIBLE SUBSTITUTE SKIN GRAFT;  Surgeon: Irvin Olvera M.D.;  Location: SURGERY [facility];  Service: Orthopedics    ORIF, ANKLE Right        FAMILY HISTORY  Family History   Problem Relation Age of Onset    Heart Disease Mother     No Known Problems Father     Cancer Neg Hx     Diabetes Neg Hx      Stroke Neg Hx        SOCIAL HISTORY   reports that he has been smoking cigarettes. He has never used smokeless tobacco. He reports current alcohol use. He reports that he does not use drugs.    CURRENT MEDICATIONS  Current Discharge Medication List        CONTINUE these medications which have NOT CHANGED    Details   risperiDONE (RISPERDAL) 1 MG Tab TAKE 1 TABLET BY MOUTH TWICE DAILY.  Qty: 60 Tablet, Refills: 5      HYDROcodone-acetaminophen (NORCO) 5-325 MG Tab per tablet Take 0.5 Tablets by mouth 3 times a day for 30 days. For chronic  Qty: 45 Tablet, Refills: 0    Comments: Please discontinue previous hydrocodone orders.  Associated Diagnoses: Uncomplicated opioid dependence (HCC)      montelukast (SINGULAIR) 10 MG Tab Take 1 Tablet by mouth every day. For COPD  Qty: 30 Tablet, Refills: 11      traZODone (DESYREL) 100 MG Tab TAKE 1 TABLET BY MOUTH AT BEDTIME  Qty: 30 Tablet, Refills: 5      divalproex (DEPAKOTE SPRINKLE) 125 MG Capsule Delayed Release Sprinkle Take one capsule at 0800, 1400 and then one capsules at 2000 for a total of 4 capsules daily.Take one capsule at 0800, 1400 and then two capsules at 2000 for a total of 4 capsules daily.  Qty: 120 Capsule, Refills: 11      Dextromethorphan-guaiFENesin (COUGH/CHEST CONGESTION DM)  MG/5ML Syrup TAKE 10ML BY MOUTH EVERY 6 HOURS AS NEEDED FOR COUGH.  Qty: 826 mL, Refills: 3    Associated Diagnoses: Cough      Naloxone (NARCAN) 4 MG/0.1ML Liquid One spray in one nostril for overdose and call 911.  Qty: 1 Each, Refills: 1      gabapentin (NEURONTIN) 300 MG Cap TAKE 1 CAPSULE BY MOUTH THREE TIMES DAILY.  Qty: 90 Capsule, Refills: 11      STIMULANT LAXATIVE 8.6-50 MG Tab TAKE (2) TABLETS BY MOUTH TWICE DAILY  Qty: 120 Tablet, Refills: 11      cyclobenzaprine (FLEXERIL) 10 mg Tab TAKE 1 TABLET BY MOUTH THREE TIMES DAILY AS NEEDED FOR MUSCLE SPASM.  Qty: 90 Tablet, Refills: 3             ALLERGIES  Patient has no known allergies.    PHYSICAL EXAM  BP (!)  "157/87   Pulse (!) 44   Temp 36.2 °C (97.1 °F) (Temporal)   Resp 20   Ht 1.778 m (5' 10\")   Wt 63 kg (139 lb)   SpO2 94%   BMI 19.94 kg/m²     Constitutional: Well developed, well nourished; No acute distress   HENT: Normocephalic, Atraumatic, Bilateral external ears normal,  Eyes: PERRL, EOMI, Conjunctiva normal, No discharge.   Neck: Normal range of motion, supple, nontender  Lymphatic: No lymphadenopathy noted.   Cardiovascular: bradycardic heart rate, distant heart sounds.  Normal rhythm, No murmurs, rubs or gallops   Thorax & Lungs:   slightly decreased breath sounds throughout, No respiratory distress, No wheezing rales, or rhonchi; No chest tenderness. No crepitus or subQ air  Abdomen: Nontender,  no guarding no rebound, no masses, no pulsatile mass, no tenderness, no distention  Skin: Warm, Dry, No erythema, No rash.   Back: No tenderness, No CVA tenderness.   Extremities: 2+ dp and pt pulses bilateral LEs;  Nontender; no pretibial edema  Neurologic: awake, alert, Not oriented to place,time, or person, moves all extremities.  Speech is clear.  Follows commands.  No facial asymmetry.  Psychiatric: Confused with history of dementia    DIAGNOSTIC STUDIES & PROCEDURES    Labs:   Results for orders placed or performed during the hospital encounter of 11/03/23   CBC WITH DIFFERENTIAL   Result Value Ref Range    WBC 5.9 4.8 - 10.8 K/uL    RBC 4.47 (L) 4.70 - 6.10 M/uL    Hemoglobin 13.3 (L) 14.0 - 18.0 g/dL    Hematocrit 41.5 (L) 42.0 - 52.0 %    MCV 92.8 81.4 - 97.8 fL    MCH 29.8 27.0 - 33.0 pg    MCHC 32.0 (L) 32.3 - 36.5 g/dL    RDW 46.5 35.9 - 50.0 fL    Platelet Count 272 164 - 446 K/uL    MPV 10.0 9.0 - 12.9 fL    Neutrophils-Polys 66.50 44.00 - 72.00 %    Lymphocytes 23.20 22.00 - 41.00 %    Monocytes 8.80 0.00 - 13.40 %    Eosinophils 0.50 0.00 - 6.90 %    Basophils 0.50 0.00 - 1.80 %    Immature Granulocytes 0.50 0.00 - 0.90 %    Nucleated RBC 0.00 0.00 - 0.20 /100 WBC    Neutrophils (Absolute) " 3.95 1.82 - 7.42 K/uL    Lymphs (Absolute) 1.38 1.00 - 4.80 K/uL    Monos (Absolute) 0.52 0.00 - 0.85 K/uL    Eos (Absolute) 0.03 0.00 - 0.51 K/uL    Baso (Absolute) 0.03 0.00 - 0.12 K/uL    Immature Granulocytes (abs) 0.03 0.00 - 0.11 K/uL    NRBC (Absolute) 0.00 K/uL   COMP METABOLIC PANEL   Result Value Ref Range    Sodium 140 135 - 145 mmol/L    Potassium 3.7 3.6 - 5.5 mmol/L    Chloride 100 96 - 112 mmol/L    Co2 26 20 - 33 mmol/L    Anion Gap 14.0 7.0 - 16.0    Glucose 93 65 - 99 mg/dL    Bun 15 8 - 22 mg/dL    Creatinine 0.71 0.50 - 1.40 mg/dL    Calcium 9.7 8.5 - 10.5 mg/dL    Correct Calcium 9.5 8.5 - 10.5 mg/dL    AST(SGOT) 22 12 - 45 U/L    ALT(SGPT) 27 2 - 50 U/L    Alkaline Phosphatase 80 30 - 99 U/L    Total Bilirubin 0.4 0.1 - 1.5 mg/dL    Albumin 4.3 3.2 - 4.9 g/dL    Total Protein 7.7 6.0 - 8.2 g/dL    Globulin 3.4 1.9 - 3.5 g/dL    A-G Ratio 1.3 g/dL   TROPONIN   Result Value Ref Range    Troponin T 12 6 - 19 ng/L   proBrain Natriuretic Peptide, NT   Result Value Ref Range    NT-proBNP 129 (H) 0 - 125 pg/mL   MAGNESIUM   Result Value Ref Range    Magnesium 1.8 1.5 - 2.5 mg/dL   URINALYSIS (UA)    Specimen: Urine   Result Value Ref Range    Color Yellow     Character Clear     Specific Gravity 1.009 <1.035    Ph 7.0 5.0 - 8.0    Glucose Negative Negative mg/dL    Ketones Negative Negative mg/dL    Protein Negative Negative mg/dL    Bilirubin Negative Negative    Urobilinogen, Urine 0.2 Negative    Nitrite Negative Negative    Leukocyte Esterase Moderate (A) Negative    Occult Blood Trace (A) Negative    Micro Urine Req Microscopic    Lactic Acid   Result Value Ref Range    Lactic Acid 1.7 0.5 - 2.0 mmol/L   URINE MICROSCOPIC (W/UA)   Result Value Ref Range    WBC 10-20 (A) /hpf    RBC 2-5 (A) /hpf    Bacteria Negative None /hpf    Epithelial Cells Negative /hpf    Hyaline Cast 0-2 /lpf   ESTIMATED GFR   Result Value Ref Range    GFR (CKD-EPI) 99 >60 mL/min/1.73 m 2   EKG (Now)   Result Value Ref  Range    Report       [facility] Emergency Dept.    Test Date:  2023  Pt Name:    GRUPO GANN                 Department: ER  MRN:        [id number]                      Room:        27  Gender:     Male                         Technician: [id number]  :        1955                   Requested By:ER TRIAGE PROTOCOL  Order #:    [id number]                    Reading MD: Yanique Delgado    Measurements  Intervals                                Axis  Rate:       47                           P:          72  MI:         173                          QRS:        82  QRSD:       114                          T:          69  QT:         494  QTc:        437    Interpretive Statements  Sinus bradycardia rate axis  Normal axis  Normal intervals  No ST elevation or depression  Anteroseptal infarct, old  Nonspecific T abnormalities, lateral leads  Artifact in lead(s) I,III,aVL,V1,V2,V3,V4,V5,V6 and baseline wander in  lead(s) V5  Compared to ECG 2020 13:21:06  Sinus rhythm no longer present   T-wave abnormality still present  Electronically Signed On 2023 17:11:53 PDT by Yanique Delgado       All labs reviewed by me.    EKG:   I have independently interpreted this EKG as seen above.     Radiology:   The attending Emergency Physician has independently interpreted the diagnostic imaging associated with this visit and is awaiting the final reading from the radiologist, which will be displayed below.    Preliminary interpretation is a follows: ER MD is reviewed the patient's chest x-ray.  No obvious infiltrates.  He does, however, have some increased interstitial markings throughout.    Radiologist interpretation:    DX-CHEST-PORTABLE (1 VIEW)   Final Result      No acute cardiac or pulmonary abnormalities are identified.      EC-ECHOCARDIOGRAM COMPLETE W/O CONT    (Results Pending)      COURSE & MEDICAL DECISION MAKING    ED Observation Status? No; Patient does not meet criteria for ED Observation.      INITIAL ASSESSMENT AND PLAN  Care Narrative: Patient presents to the ER at the request of his primary care APRN who saw him earlier today and found him to be bradycardic with a heart rate of 44.  Upon arrival he was bradycardic with heart rate in the low 40s.  Patient has dementia.  When I walked in the room there was a piece of paper on his bed stating that he was at the hospital and into [place].  It was a reminder for patient since he does not remember where he is.  He does not know the year.  He does not know why he is here.  He knows his name.  Patient has no complaints.  He denies being in any pain.  He says he does not feel sick.  He denies feeling dizzy.  At this time it certainly seems like patient is asymptomatic with this bradycardia.  It is a sinus bradycardia.  Patient cannot remember if he had any chest pain in the last few days.  Heart rate did come up a little bit throughout his ED stay.  He is not on a beta-blocker.  Review of the primary care notes going back to June revealed that patient's heart rate is typically in the 60s, 70s and 80s.  Sounds like bradycardia in the 40s is unusual for this patient.  At this time I think it is best to hospitalize the patient for telemetry monitoring and echocardiogram and for further evaluation and management of his new onset bradycardia.  I spoke with [facility] family medicine residents on-call and they will kindly evaluate the patient for hospitalization.      2:08 PM - Patient seen and evaluated at bedside. Ordered labs and imaging to evaluate. He understands and agrees to the plan of care.    5:12 PM - Paged Hospitalist.    5:30 PM - Case discussed with Hospitalist Dr. Llamas ([facility]) regarding admission. They agree to evaluate the patient.    HTN/IDDM FOLLOW UP:  The patient has known hypertension and is being followed by their primary care doctor    ADDITIONAL PROBLEM LIST AND DISPOSITION  Problem #1: Bradycardia                DISPOSITION AND DISCUSSIONS  I have discussed management of the patient with the following physicians and SIMONA's: ([facility] resident)    Discussion of management with other [facility] or appropriate source(s): None     Escalation of care considered, and ultimately not performed: diagnostic imaging.  Patient has not hypoxic or tachycardic.  No complaints of chest pain or shortness of breath.  I do not think he needs a CT scan of the chest.    Barriers to care at this time, including but not limited to:  Patient has dementia .     Decision tools and prescription drugs considered including, but not limited to:  This time patient is asymptomatic with his bradycardia.  No signs of a heart block.  No need for atropine, pacing or any other intervention .    DISPOSITION:  Patient will be hospitalized by [facility] family medicine residents in guarded condition.    FINAL IMPRESSION   1. Bradycardia Acute       This dictation has been created using voice recognition software. The accuracy of the dictation is limited by the abilities of the software. I expect there may be some errors of grammar and possibly content. I made every attempt to manually correct the errors within my dictation. However, errors related to voice recognition software may still exist and should be interpreted within the appropriate context.    Steven ODOM ([place]), am scribing for, and in the presence of, Yanique Delgado M.D..    Electronically signed by: Steven Reno ([place]), 11/3/2023    Yanique ODOM M.D. personally performed the services described in this documentation, as scribed by Steven Reno in my presence, and it is both accurate and complete.    The note accurately reflects work and decisions made by me.  Yanique Delgado M.D.  11/3/2023  10:29 PM

## 2023-11-04 ENCOUNTER — APPOINTMENT (OUTPATIENT)
Dept: CARDIOLOGY | Facility: MEDICAL CENTER | Age: 68
DRG: 689 | End: 2023-11-04
Payer: MEDICARE

## 2023-11-04 LAB
APPEARANCE UR: CLEAR
BACTERIA #/AREA URNS HPF: ABNORMAL /HPF
BILIRUB UR QL STRIP.AUTO: NEGATIVE
COLOR UR: YELLOW
EPI CELLS #/AREA URNS HPF: NEGATIVE /HPF
GLUCOSE UR STRIP.AUTO-MCNC: NEGATIVE MG/DL
HYALINE CASTS #/AREA URNS LPF: ABNORMAL /LPF
KETONES UR STRIP.AUTO-MCNC: NEGATIVE MG/DL
LEUKOCYTE ESTERASE UR QL STRIP.AUTO: ABNORMAL
LV EJECT FRACT  99904: 65
LV EJECT FRACT MOD 4C 99902: 67.79
MICRO URNS: ABNORMAL
NITRITE UR QL STRIP.AUTO: POSITIVE
PH UR STRIP.AUTO: 7.5 [PH] (ref 5–8)
PROT UR QL STRIP: NEGATIVE MG/DL
RBC # URNS HPF: ABNORMAL /HPF
RBC UR QL AUTO: NEGATIVE
SP GR UR STRIP.AUTO: 1.01
TSH SERPL DL<=0.005 MIU/L-ACNC: 2.51 UIU/ML (ref 0.38–5.33)
UROBILINOGEN UR STRIP.AUTO-MCNC: 0.2 MG/DL
WBC #/AREA URNS HPF: ABNORMAL /HPF

## 2023-11-04 PROCEDURE — 36415 COLL VENOUS BLD VENIPUNCTURE: CPT

## 2023-11-04 PROCEDURE — 99222 1ST HOSP IP/OBS MODERATE 55: CPT | Mod: AI,GC | Performed by: FAMILY MEDICINE

## 2023-11-04 PROCEDURE — 700111 HCHG RX REV CODE 636 W/ 250 OVERRIDE (IP): Mod: JZ

## 2023-11-04 PROCEDURE — 84443 ASSAY THYROID STIM HORMONE: CPT

## 2023-11-04 PROCEDURE — 770020 HCHG ROOM/CARE - TELE (206)

## 2023-11-04 PROCEDURE — 81001 URINALYSIS AUTO W/SCOPE: CPT

## 2023-11-04 PROCEDURE — 93306 TTE W/DOPPLER COMPLETE: CPT | Mod: 26 | Performed by: INTERNAL MEDICINE

## 2023-11-04 PROCEDURE — 700102 HCHG RX REV CODE 250 W/ 637 OVERRIDE(OP)

## 2023-11-04 PROCEDURE — A9270 NON-COVERED ITEM OR SERVICE: HCPCS

## 2023-11-04 PROCEDURE — 93306 TTE W/DOPPLER COMPLETE: CPT

## 2023-11-04 PROCEDURE — RXMED WILLOW AMBULATORY MEDICATION CHARGE

## 2023-11-04 RX ORDER — NITROFURANTOIN 25; 75 MG/1; MG/1
100 CAPSULE ORAL 2 TIMES DAILY WITH MEALS
Status: DISCONTINUED | OUTPATIENT
Start: 2023-11-04 | End: 2023-11-05 | Stop reason: HOSPADM

## 2023-11-04 RX ORDER — ACETAMINOPHEN 325 MG/1
650 TABLET ORAL EVERY 6 HOURS PRN
Qty: 30 TABLET | Refills: 0 | Status: SHIPPED
Start: 2023-11-04 | End: 2024-01-11

## 2023-11-04 RX ORDER — NITROFURANTOIN 25; 75 MG/1; MG/1
100 CAPSULE ORAL 2 TIMES DAILY WITH MEALS
Qty: 9 CAPSULE | Refills: 0 | Status: ACTIVE | OUTPATIENT
Start: 2023-11-04 | End: 2023-11-10

## 2023-11-04 RX ORDER — TRAZODONE HYDROCHLORIDE 50 MG/1
50 TABLET ORAL
Qty: 30 TABLET | Refills: 3 | Status: SHIPPED | OUTPATIENT
Start: 2023-11-04 | End: 2024-01-11

## 2023-11-04 RX ADMIN — GABAPENTIN 300 MG: 300 CAPSULE ORAL at 05:50

## 2023-11-04 RX ADMIN — GABAPENTIN 300 MG: 300 CAPSULE ORAL at 11:27

## 2023-11-04 RX ADMIN — DOCUSATE SODIUM 50 MG AND SENNOSIDES 8.6 MG 2 TABLET: 8.6; 5 TABLET, FILM COATED ORAL at 17:03

## 2023-11-04 RX ADMIN — DIVALPROEX SODIUM 250 MG: 250 TABLET, DELAYED RELEASE ORAL at 09:28

## 2023-11-04 RX ADMIN — RISPERIDONE 1 MG: 1 TABLET ORAL at 05:51

## 2023-11-04 RX ADMIN — RISPERIDONE 1 MG: 1 TABLET ORAL at 17:03

## 2023-11-04 RX ADMIN — NITROFURANTOIN MONOHYDRATE/MACROCRYSTALLINE 100 MG: 25; 75 CAPSULE ORAL at 13:28

## 2023-11-04 RX ADMIN — MONTELUKAST 10 MG: 10 TABLET, FILM COATED ORAL at 05:51

## 2023-11-04 RX ADMIN — NITROFURANTOIN MONOHYDRATE/MACROCRYSTALLINE 100 MG: 25; 75 CAPSULE ORAL at 17:03

## 2023-11-04 RX ADMIN — TRAZODONE HYDROCHLORIDE 50 MG: 50 TABLET ORAL at 20:58

## 2023-11-04 RX ADMIN — DIVALPROEX SODIUM 250 MG: 250 TABLET, DELAYED RELEASE ORAL at 20:58

## 2023-11-04 RX ADMIN — DOCUSATE SODIUM 50 MG AND SENNOSIDES 8.6 MG 2 TABLET: 8.6; 5 TABLET, FILM COATED ORAL at 05:50

## 2023-11-04 RX ADMIN — GABAPENTIN 300 MG: 300 CAPSULE ORAL at 17:03

## 2023-11-04 ASSESSMENT — FIBROSIS 4 INDEX: FIB4 SCORE: 1.07

## 2023-11-04 NOTE — ASSESSMENT & PLAN NOTE
Pt is full code. He is a mireles of the [facility]. As per his group home's understanding, an ethics committee needs to decide to change him to DNR/DNI. He would benefit from this change. Can consider pursuing inpatient. Should be followed up outpatient given his clinical status regarding his dementia.

## 2023-11-04 NOTE — H&P
[facility] [place] [facility] MEDICINE HISTORY AND PHYSICAL     PATIENT ID:  NAME:  Bola Varela  MRN:               [id number]  YOB: 1955    Date of Admission: 11/3/2023     Attending: Kiana Llamas M.d.  Primary Care Physician:  FELIPE Flores    CC:    Chief Complaint   Patient presents with    Sent by MD     For bradycardia        HPI: Bola Varela is a 68 y.o. male with late stage dementia who presents to the ED after sinus bradycardia was noted by his outpatient APRN geriatrician.  On admission the patient denies any active symptoms.  Due to his dementia he is unable to tell me whether or not he has had symptoms.  Of note his facility denies any syncope or that he was complaining of symptoms prior to coming to the hospital.  The patient explicitly denies chest pain, shortness of breath, chest pressure, any pain rating down his arms or into his jaw.  The patient also denies any feelings of palpitations.    There are no systemic symptoms.  No fevers, no noticed chills, no reports of diarrhea or vomiting.  No changes to his urinary habits.  As per staff at his group home the patient's baseline is unchanged.    Of note this patient is a mireles of the [facility] and is full code.  I have been told to change him to DNR/DNI they would need to consult an ethics committee.  I was unable to speak with the mireles of the [facility] as I admitted him after hours on Friday.  His group home is available to talk.      REVIEW OF SYSTEMS:   Ten systems reviewed and were negative except as noted in the HPI.                PAST MEDICAL HISTORY:  Past Medical History:   Diagnosis Date    Essential hypertension 11/13/2018    Psychiatric problem     Restless leg     Tobacco use        PAST SURGICAL HISTORY:  Past Surgical History:   Procedure Laterality Date    IRRIGATION & DEBRIDEMENT ORTHO Left 11/19/2020    Procedure: IRRIGATION AND DEBRIDEMENT, WOUND - LEG WITH WOUND VAC AND POSSIBLE SUBSTITUTE SKIN GRAFT;  Surgeon: Irvin SANCHEZ  MARIBELL Olvera;  Location: SURGERY [facility];  Service: Orthopedics    ORIF, ANKLE Right        FAMILY HISTORY:  Family History   Problem Relation Age of Onset    Heart Disease Mother     No Known Problems Father     Cancer Neg Hx     Diabetes Neg Hx     Stroke Neg Hx        SOCIAL HISTORY:   Social History     Socioeconomic History    Marital status: Single     Spouse name: Not on file    Number of children: Not on file    Years of education: Not on file    Highest education level: Not on file   Occupational History    Not on file   Tobacco Use    Smoking status: Light Smoker     Current packs/day: 0.00     Types: Cigarettes    Smokeless tobacco: Never    Tobacco comments:     1 ppd until few years ago   Vaping Use    Vaping Use: Never used   Substance and Sexual Activity    Alcohol use: Yes     Comment: occasional beer    Drug use: No     Types: Marijuana    Sexual activity: Not Currently     Partners: Female   Other Topics Concern    Not on file   Social History Narrative    Not on file     Social Determinants of Health     Financial Resource Strain: Not on file   Food Insecurity: No Food Insecurity (1/31/2020)    Hunger Vital Sign     Worried About Running Out of Food in the Last Year: Never true     Ran Out of Food in the Last Year: Never true   Transportation Needs: No Transportation Needs (1/31/2020)    PRAPARE - Transportation     Lack of Transportation (Medical): No     Lack of Transportation (Non-Medical): No   Physical Activity: Not on file   Stress: Not on file   Social Connections: Not on file   Intimate Partner Violence: Not on file   Housing Stability: Not on file       DIET:   Orders Placed This Encounter   Procedures    Diet Order Diet: Regular     Standing Status:   Standing     Number of Occurrences:   1     Order Specific Question:   Diet:     Answer:   Regular [1]       ALLERGIES:  No Known Allergies    OUTPATIENT MEDICATIONS:    Current Facility-Administered Medications:     senna-docusate  (Pericolace Or Senokot S) 8.6-50 MG per tablet 2 Tablet, 2 Tablet, Oral, BID **AND** polyethylene glycol/lytes (Miralax) PACKET 1 Packet, 1 Packet, Oral, QDAY PRN **AND** magnesium hydroxide (Milk Of Magnesia) suspension 30 mL, 30 mL, Oral, QDAY PRN **AND** bisacodyl (Dulcolax) suppository 10 mg, 10 mg, Rectal, QDAY PRN, Rohit Valencia M.D.    [START ON 2023] enoxaparin (Lovenox) inj 40 mg, 40 mg, Subcutaneous, DAILY AT 1800, Rohit Valencia M.D.    acetaminophen (Tylenol) tablet 650 mg, 650 mg, Oral, Q6HRS PRN, Rohit Valencia M.D.    risperiDONE (RisperDAL) tablet 1 mg, 1 mg, Oral, BID, Rohit Valencia M.D., 1 mg at 23    traZODone (Desyrel) tablet 50 mg, 50 mg, Oral, QHS, Rohit Valencia M.D., 50 mg at 23    [START ON 2023] montelukast (Singulair) tablet 10 mg, 10 mg, Oral, DAILY, Rohit Valencia M.D.    HYDROcodone-acetaminophen (Norco) 5-325 MG per tablet 0.5 Tablet, 0.5 Tablet, Oral, Q6HRS PRN, Rohit Valencia M.D.    gabapentin (Neurontin) capsule 300 mg, 300 mg, Oral, TID, Rohit Valencia M.D., 300 mg at 23    divalproex (Depakote) DR tablet 250 mg, 250 mg, Oral, BID, Rohit Valencia M.D.    ipratropium (Atrovent) 0.06 % nasal spray 2 Spray, 2 Spray, Nasal, Q4HRS PRN, Rohit Valencia M.D.    PHYSICAL EXAM:  Vitals:    23 1841 23 1903 11/23   BP: (!) 162/87 (!) 156/80  (P) 135/76   Pulse: (!) 54 (!) 114 67 (!) (P) 54   Resp: 19   (P) 12   Temp:    (P) 36.5 °C (97.7 °F)   TempSrc:    (P) Temporal   SpO2: 95% 93% 95% (P) 95%   Weight:    54.8 kg (120 lb 13 oz)   Height:       , Temp (24hrs), Av.3 °C (97.4 °F), Min:36.2 °C (97.1 °F), Max:36.5 °C (97.7 °F)  , Pulse Oximetry: (P) 95 %, O2 Delivery Device: (P) None - Room Air      General: Pt resting in NAD, cooperative,   Skin:  Pink, warm and dry.  No rashes  HEENT: NC/AT. PERRL. EOMI. MMM. No nasal discharge. Oropharynx nonerythematous without  exudate/plaques  Neck:  Supple without lymphadenopathy or rigidity.  Lungs:  Symmetrical.  CTAB with no adventitious breath sounds.  Good air movement   Cardiovascular:  Normal S1/S2, RRR without M/R/G. Not bradycardic on admission  Abdomen:  BS+, Soft, NT/ND. No masses noted. No suprapubic tenderness  Extremities:  Full range of motion. No gross deformities noted. 2+ pulses in all extremities. No C/C/E   Spine:  Straight without vertebral anomalies.  CNS:  Oriented to name. Not to year or place. Moves all 4 extremities well. Follows commands. Intention tremor.        LAB TESTS:   Admission on 2023   Component Date Value Ref Range Status    Report 2023    Final                    Value:[facility]    Test Date:  2023  Pt Name:    GRUPO GANN                 Department: ER  MRN:        [id number]                      Room:       [facility]  Gender:     Male                         Technician: [id number]  :        1955                   Requested By:ER TRIAGE PROTOCOL  Order #:    [id number]                    Reading MD: Yanique Delgado    Measurements  Intervals                                Axis  Rate:       47                           P:          72  LA:         173                          QRS:        82  QRSD:       114                          T:          69  QT:         494  QTc:        437    Interpretive Statements  Sinus bradycardia rate axis  Normal axis  Normal intervals  No ST elevation or depression  Anteroseptal infarct, old  Nonspecific T abnormalities, lateral leads  Artifact in lead(s) I,III,aVL,V1,V2,V3,V4,V5,V6 and baseline wander in  lead(s) V5  Compared to ECG 2020 13:21:06  Sinus rhythm no longer present                            T-wave abnormality still present  Electronically Signed On 2023 17:11:53 PDT by Yanique Delgado      WBC 2023 5.9  4.8 - 10.8 K/uL Final    RBC 2023 4.47 (L)  4.70 - 6.10 M/uL Final    Hemoglobin  11/03/2023 13.3 (L)  14.0 - 18.0 g/dL Final    Hematocrit 11/03/2023 41.5 (L)  42.0 - 52.0 % Final    MCV 11/03/2023 92.8  81.4 - 97.8 fL Final    MCH 11/03/2023 29.8  27.0 - 33.0 pg Final    MCHC 11/03/2023 32.0 (L)  32.3 - 36.5 g/dL Final    Please note new reference range effective 05/22/2023.    RDW 11/03/2023 46.5  35.9 - 50.0 fL Final    Platelet Count 11/03/2023 272  164 - 446 K/uL Final    MPV 11/03/2023 10.0  9.0 - 12.9 fL Final    Neutrophils-Polys 11/03/2023 66.50  44.00 - 72.00 % Final    Lymphocytes 11/03/2023 23.20  22.00 - 41.00 % Final    Monocytes 11/03/2023 8.80  0.00 - 13.40 % Final    Eosinophils 11/03/2023 0.50  0.00 - 6.90 % Final    Basophils 11/03/2023 0.50  0.00 - 1.80 % Final    Immature Granulocytes 11/03/2023 0.50  0.00 - 0.90 % Final    Nucleated RBC 11/03/2023 0.00  0.00 - 0.20 /100 WBC Final    Please note new reference range effective 05/22/2023.    Neutrophils (Absolute) 11/03/2023 3.95  1.82 - 7.42 K/uL Final    Comment: Includes immature neutrophils, if present.  Please note new reference range effective 05/22/2023.      Lymphs (Absolute) 11/03/2023 1.38  1.00 - 4.80 K/uL Final    Monos (Absolute) 11/03/2023 0.52  0.00 - 0.85 K/uL Final    Eos (Absolute) 11/03/2023 0.03  0.00 - 0.51 K/uL Final    Baso (Absolute) 11/03/2023 0.03  0.00 - 0.12 K/uL Final    Immature Granulocytes (abs) 11/03/2023 0.03  0.00 - 0.11 K/uL Final    NRBC (Absolute) 11/03/2023 0.00  K/uL Final    Sodium 11/03/2023 140  135 - 145 mmol/L Final    Potassium 11/03/2023 3.7  3.6 - 5.5 mmol/L Final    Chloride 11/03/2023 100  96 - 112 mmol/L Final    Co2 11/03/2023 26  20 - 33 mmol/L Final    Anion Gap 11/03/2023 14.0  7.0 - 16.0 Final    Glucose 11/03/2023 93  65 - 99 mg/dL Final    Bun 11/03/2023 15  8 - 22 mg/dL Final    Creatinine 11/03/2023 0.71  0.50 - 1.40 mg/dL Final    Calcium 11/03/2023 9.7  8.5 - 10.5 mg/dL Final    Correct Calcium 11/03/2023 9.5  8.5 - 10.5 mg/dL Final    AST(SGOT) 11/03/2023 22  12 -  45 U/L Final    ALT(SGPT) 11/03/2023 27  2 - 50 U/L Final    Alkaline Phosphatase 11/03/2023 80  30 - 99 U/L Final    Total Bilirubin 11/03/2023 0.4  0.1 - 1.5 mg/dL Final    Albumin 11/03/2023 4.3  3.2 - 4.9 g/dL Final    Total Protein 11/03/2023 7.7  6.0 - 8.2 g/dL Final    Globulin 11/03/2023 3.4  1.9 - 3.5 g/dL Final    A-G Ratio 11/03/2023 1.3  g/dL Final    Troponin T 11/03/2023 12  6 - 19 ng/L Final    Comment: Biotin intake of greater than 5 mg per day may interfere with  troponin levels, causing false low values.    The Ultra High Sensitivity Troponin T test has a reference range  for positive troponins that follows the recommendation of ACC for  the 99th percentile reference population.      NT-proBNP 11/03/2023 129 (H)  0 - 125 pg/mL Final    Magnesium 11/03/2023 1.8  1.5 - 2.5 mg/dL Final    Color 11/03/2023 Yellow   Final    Character 11/03/2023 Clear   Final    Specific Gravity 11/03/2023 1.009  <1.035 Final    Ph 11/03/2023 7.0  5.0 - 8.0 Final    Glucose 11/03/2023 Negative  Negative mg/dL Final    Ketones 11/03/2023 Negative  Negative mg/dL Final    Protein 11/03/2023 Negative  Negative mg/dL Final    Bilirubin 11/03/2023 Negative  Negative Final    Urobilinogen, Urine 11/03/2023 0.2  Negative Final    Nitrite 11/03/2023 Negative  Negative Final    Leukocyte Esterase 11/03/2023 Moderate (A)  Negative Final    Occult Blood 11/03/2023 Trace (A)  Negative Final    Micro Urine Req 11/03/2023 Microscopic   Final    Lactic Acid 11/03/2023 1.7  0.5 - 2.0 mmol/L Final    WBC 11/03/2023 10-20 (A)  /hpf Final    Comment: Female  <12 Yr 0-2  >12 Yr 0-5  Male   None      RBC 11/03/2023 2-5 (A)  /hpf Final    Comment: Female  >12 Yr 0-2  Male   None      Bacteria 11/03/2023 Negative  None /hpf Final    Epithelial Cells 11/03/2023 Negative  /hpf Final    Hyaline Cast 11/03/2023 0-2  /lpf Final    GFR (CKD-EPI) 11/03/2023 99  >60 mL/min/1.73 m 2 Final    Comment: Estimated Glomerular Filtration Rate is calculated  using  race neutral CKD-EPI 2021 equation per NKF-ASN recommendations.          CULTURES:   Results       Procedure Component Value Units Date/Time    URINE CULTURE-EXISTING-LESS THAN 48 HOURS [[id number]] Collected: 11/03/23 1539    Order Status: Sent Specimen: Urine, Clean Catch Updated: 11/03/23 1620    Narrative:      Indication for culture:->Patient WITHOUT an indwelling [facility]  catheter in place with new onset of Dysuria, Frequency,  Urgency, and/or Suprapubic pain    URINALYSIS (UA) [[id number]]  (Abnormal) Collected: 11/03/23 1539    Order Status: Completed Specimen: Urine Updated: 11/03/23 1558     Color Yellow     Character Clear     Specific Gravity 1.009     Ph 7.0     Glucose Negative mg/dL      Ketones Negative mg/dL      Protein Negative mg/dL      Bilirubin Negative     Urobilinogen, Urine 0.2     Nitrite Negative     Leukocyte Esterase Moderate     Occult Blood Trace     Micro Urine Req Microscopic    Narrative:      Release to patient->Immediate            IMAGES:  DX-CHEST-PORTABLE (1 VIEW)   Final Result      No acute cardiac or pulmonary abnormalities are identified.      EC-ECHOCARDIOGRAM COMPLETE W/O CONT    (Results Pending)       CONSULTS:   None    ASSESSMENT/PLAN: 68 y.o. male admitted for    * Bradycardia- (present on admission)  Assessment & Plan  Sinus bradycardia on admission. No clear etiology. No systemic infectious symptoms. Pt denying any symptoms but severe dementia. Facility denying any syncope. No blatant medications causing bradycardia. Some possible, however. See below    Plan:  - Admit to tele  - Echo  - Can consider cards consult pending results.  - UA with red cells, white cells, and leuk esterase. Physical exam benign. No signs or symptoms of systemic infectious symptoms. Physical exam benign. Can consider repeat UA. Pt at high risk for renal and bladder cancer given smoking history.  - Trazodone cut down to 50 mg qday. Bradycardia can be side effect.  - Risperdal. Continued.  Bradycardia also possible side effect. Can consider decreasing or weaning.  - Will be difficult on weekend but can consider discussing case with his APRN geriatrician. She is well acquainted with the patient.    Advanced care planning/counseling discussion- (present on admission)  Assessment & Plan  Pt is full code. He is a mireles of the [facility]. As per his group home's understanding, an ethics committee needs to decide to change him to DNR/DNI. He would benefit from this change. Can consider pursuing inpatient. Should be followed up outpatient given his clinical status regarding his dementia.    SunDown syndrome- (present on admission)  Assessment & Plan  Continue home rx. Trazodone tapered to 50 mg qhs out of bradycardia concerns. Risperdal continued, however, also with concerns for bradycardia. 1 to 1 sitter if needed.    Other chronic pain- (present on admission)  Assessment & Plan  Continue home hydrocodone. Appears that patient is also receiving depakote for pain as well. Continued.    Emphysema/COPD (HCC)- (present on admission)  Assessment & Plan  Unclear when diagnosis was made. Do not have records. Not on controller medications/inhalers. Currently saturating well on RA. No increased WOB. Will defer further work-up and treatment to day team.    Subacute cough- (present on admission)  Assessment & Plan  Has been worked up outpatient. Admission chest x-ray without obvious source. Saturating well on RA. Can trial nasal atrovent.     Pyuria  Assessment & Plan  No nitrites or bacteria. Also with hematuria. Pt without symptoms of UTI per group home and on my physical exam. He does have high risk of renal or bladder cancer.     Will defer abx for now. Urine culture pending. Can consider further work-up for malignancy outpatient. Can consider urology consult on discharge pending clinical course here.          Core Measures:  Fluids: PO  Lines: PIV  Abx: none  Diet: Regular.  PPX: SCDs and lovenox  DISPO:  Inpatient    CODE STATUS: Full        Rohit Valencia MD  PGY-3  [facility]

## 2023-11-04 NOTE — ASSESSMENT & PLAN NOTE
No nitrites or bacteria. Also with hematuria. Pt without symptoms of UTI per group home and on my physical exam. He does have high risk of renal or bladder cancer.     Will defer abx for now. Urine culture pending. Can consider further work-up for malignancy outpatient. Can consider urology consult on discharge pending clinical course here.

## 2023-11-04 NOTE — ASSESSMENT & PLAN NOTE
Sinus bradycardia on admission. No clear etiology. No systemic infectious symptoms. Pt denying any symptoms but severe dementia. Facility denying any syncope. No blatant medications causing bradycardia. Some possible, however. See below    Plan:  - Admit to tele  - Echo  - Can consider cards consult pending results.  - UA with red cells, white cells, and leuk esterase. Physical exam benign. No signs or symptoms of systemic infectious symptoms. Physical exam benign. Can consider repeat UA. Pt at high risk for renal and bladder cancer given smoking history.  - Trazodone cut down to 50 mg qday. Bradycardia can be side effect.  - Risperdal. Continued. Bradycardia also possible side effect. Can consider decreasing or weaning.  - Will be difficult on weekend but can consider discussing case with his APRN geriatrician. She is well acquainted with the patient.

## 2023-11-04 NOTE — ASSESSMENT & PLAN NOTE
Continue home rx. Trazodone tapered to 50 mg qhs out of bradycardia concerns. Risperdal continued, however, also with concerns for bradycardia. 1 to 1 sitter if needed.

## 2023-11-04 NOTE — ASSESSMENT & PLAN NOTE
Unclear when diagnosis was made. Do not have records. Not on controller medications/inhalers. Currently saturating well on RA. No increased WOB. Will defer further work-up and treatment to day team.

## 2023-11-04 NOTE — CARE PLAN
The patient is Stable - Low risk of patient condition declining or worsening         Progress made toward(s) clinical / shift goals:    Problem: Fall Risk  Goal: Patient will remain free from falls  Outcome: Progressing       Patient is not progressing towards the following goals:      Problem: Knowledge Deficit - Standard  Goal: Patient and family/care givers will demonstrate understanding of plan of care, disease process/condition, diagnostic tests and medications  Outcome: Not Progressing

## 2023-11-04 NOTE — CARE PLAN
Pt had no acute events today. Pt is very pleasant/cooperative. A&Ox self, related to late stage dementia. VSS on RA. Denies pain. OOB with SBA. Tolerating regular diet. ECHO completed with EF 65%. 1:1 in place for pt safety r/t impulsivity. Pt has been very cooperative and redirectable throughout the day. Plan to return to group home once medically cleared.     Problem: Knowledge Deficit - Standard  Goal: Patient and family/care givers will demonstrate understanding of plan of care, disease process/condition, diagnostic tests and medications  Outcome: Met     Problem: Fall Risk  Goal: Patient will remain free from falls  Outcome: Met

## 2023-11-04 NOTE — PROGRESS NOTES
Received patient from the ED, patient is alert to self only. Patient is impulsive and unable to follow commands, sitter at bedside. RA, diminished lung sounds. IV intact, flushing. Patient connected to tele monitor. Educated on hospital settings, all questions answered. All safety measures in place, call light within reach.

## 2023-11-04 NOTE — DISCHARGE SUMMARY
[facility] DISCHARGE SUMMARY     PATIENT ID:  Name:             Bola Varela   YOB: 1955  Age:                 68 y.o.  male   MRN:               [id number]  Address:         [address] [place] [address]  [place] [address]  Phone:            [phone] (home)    ADMISSION DATE:   11/3/2023    DISCHARGE DATE:   11/4/2023    DISCHARGE DIAGNOSES:   Primary Diagnoses:  Bradycardia    Acute cystitis without hematuria  -To continue oral Macrobid 100mg twice daily x 5 days     Secondary Diagnoses:   Alzheimer's dementia    ATTENDING PHYSICIAN:   Kiana Llamas MD     RESIDENT:   DO Cristela Lino DO     CONSULTANTS:    None     PROCEDURES:    None    IMAGING:   EC-ECHOCARDIOGRAM COMPLETE W/O CONT   Final Result      DX-CHEST-PORTABLE (1 VIEW)   Final Result      No acute cardiac or pulmonary abnormalities are identified.           PHYSICAL EXAM:   General: Elderly, sleeping comfortably in no distress  HEENT: NC/AT. EOMI.   Cardiovascular: HR in the 60s, regular, without murmurs. Normal capillary refill   Respiratory: CTAB, no tachypnea or retractions  Abdomen: soft, nontender, nondistended, no masses  EXT:  GATES, no edema to the lower extremities or apparent calf tenderness   Skin: Chronic appearing skin wound to the region of the left popliteal fossa with overlying crusting, no drainage, nontender  Neuro: Oriented x1 at baseline; Able state his name but does not know where he is at or situation, strength 5 out of 5 and symmetric to lower extremities bilaterally, no facial droop     LABS:  Recent Labs     11/03/23  1549 11/05/23  0404   WBC 5.9 6.1   RBC 4.47* 4.29*   HEMOGLOBIN 13.3* 12.7*   HEMATOCRIT 41.5* 39.2*   MCV 92.8 91.4   MCH 29.8 29.6   RDW 46.5 46.5   PLATELETCT 272 277   MPV 10.0 10.4   NEUTSPOLYS 66.50  --    LYMPHOCYTES 23.20  --    MONOCYTES 8.80  --    EOSINOPHILS 0.50  --    BASOPHILS 0.50  --      Recent Labs     11/03/23  1549  "11/05/23  0404   SODIUM 140 141   POTASSIUM 3.7 4.1   CHLORIDE 100 104   CO2 26 23   GLUCOSE 93 72   BUN 15 17     Lab Results   Component Value Date/Time    CHOLSTRLTOT 196 03/15/2023 09:00 AM     (H) 03/15/2023 09:00 AM    HDL 62 03/15/2023 09:00 AM    TRIGLYCERIDE 82 03/15/2023 09:00 AM       No results found for: \"TROPONINI\", \"CKMB\"  No results found for: \"TROPONINI\", \"CKMB\"         HOSPITAL COURSE:   Bola Varela is a 68 y.o. male with a history of Alzheimer's dementia, hypertension, nicotine dependence, chronic pain, chronic left lower leg wound, COPD/emphysema, and chronic progressive cough who presented from seeing his geriatrician NP Aimee Mendez on 11/3/2023 for acute onset of bradycardia in the 40s.  According to documentation review, chest x-ray had additionally shown pulmonary congestion. Review of the primary care notes going back to June revealed that patient's heart rate is typically in the 60s, 70s and 80s.  In the emergency department, patient was confused at baseline (oriented to self) due to his history of Alzheimer's dementia, he denied any complaints including chest pain, shortness of breath, leg swelling or pain, nausea/vomiting/recent illness, fever/chills, diarrhea, dysuria, or was no report of syncope at the group home.  Examination, patient otherwise had a slow heart rate in the 40s but clear lung sounds, no lower extremity edema, calf pain, or abdominal tenderness and was otherwise benign. Vital signs showed a pulse of 44, blood pressure 157/87, temp 97.1, RR 20 and oxygen saturation at 94% on room air.  His EKG showed sinus bradycardia with a rate of 47, no heart block, normal axis, no ST elevation concerning for ischemia.  Labs showed no leukocytosis, normal blood levels, normal electrolytes and liver function, normal kidney function.  Magnesium 1.8.  Troponin was normal at 12.  NT-pro BNP was minimally elevated to 129.  Urinalysis showed a moderate amount of leuk esterase and " 10-20 white cells and trace blood without positivity for nitrate or bacteria. Lactic acid came back at 1.7.  Chest x-ray showed no acute cardiac or pulmonary abnormality.  An echocardiogram was obtained and showed an EF of 65% and chronic bicupid aortic valve without abnormalities. TSH 2.5, WNL.  His UA was repeated during his admission due to borderline initial uA and showed large leukocyte estrace, 20-50 white cells, positive for nitrate, and bacteria.  Urine culture came back positive for pansensitive Citrobacter kerosi. Patient was started on oral Macrobid 100mg twice daily for a total 7 day course.     He is on trazodone and risperidone for his history of Alzheimer's disease with sundowning, his dose of trazodone was decreased to 50mg nightly upon admission and his bradycardia improved into the 50s. His risperidone was continued on the same dose.  His home medications were otherwise continued.  He has been in intermittent sinus bradycardia and sinus rhythm ranging from 44-91 on telemetry without other changes. Bradycardia differential most consistent medication induced considering he is asymptomatic, though patient does have acute cystitis and this may have contributed though may be coincidental.  He remained asymptomatic during his admission, afebrile, without complaints of pain, and on room air with stable oxygen saturations during his hospitalization.     Of note, patient is a full code.  He is a mireles of the [facility]. As per his group home's understanding, an ethics committee needs to decide to change him to DNR/DNI. He would benefit from this change. Recommend pursuing outpatient with geriatrician.    Patient will be discharged back to his group [place] in stable condition.     DISCHARGE CONDITION:    Stable    DISPOSITION:   [facility]     DISCHARGE MEDICATIONS:      Medication List        START taking these medications        Instructions   acetaminophen 325 MG Tabs  Commonly known as: Tylenol   Take  2 Tablets by mouth every 6 hours as needed for Mild Pain.  Dose: 650 mg     nitrofurantoin 100 MG Caps  Commonly known as: Macrobid   Take 1 Capsule by mouth 2 times a day with meals for 5 days.  Dose: 100 mg            CHANGE how you take these medications        Instructions   traZODone 50 MG Tabs  What changed:   medication strength  how much to take  Commonly known as: Desyrel   Take 1 Tablet by mouth at bedtime.  Dose: 50 mg            CONTINUE taking these medications        Instructions   cyclobenzaprine 10 mg Tabs  Commonly known as: Flexeril   TAKE 1 TABLET BY MOUTH THREE TIMES DAILY AS NEEDED FOR MUSCLE SPASM.     Dextromethorphan-guaiFENesin  MG/5ML Syrp  Commonly known as: Cough/Chest Congestion DM   TAKE 10ML BY MOUTH EVERY 6 HOURS AS NEEDED FOR COUGH.     divalproex 125 MG Csdr  Commonly known as: Depakote Sprinkle   Take one capsule at 0800, 1400 and then one capsules at 2000 for a total of 4 capsules daily.Take one capsule at 0800, 1400 and then two capsules at 2000 for a total of 4 capsules daily.     gabapentin 300 MG Caps  Commonly known as: Neurontin   TAKE 1 CAPSULE BY MOUTH THREE TIMES DAILY.     HYDROcodone-acetaminophen 5-325 MG Tabs per tablet  Commonly known as: Norco   Doctor's comments: Please discontinue previous hydrocodone orders.  Take 0.5 Tablets by mouth 3 times a day for 30 days. For chronic  Dose: 0.5 Tablet     montelukast 10 MG Tabs  Commonly known as: Singulair   Take 1 Tablet by mouth every day. For COPD  Dose: 10 mg     Naloxone 4 MG/0.1ML Liqd  Commonly known as: Narcan   One spray in one nostril for overdose and call 911.     risperiDONE 1 MG Tabs  Commonly known as: RisperDAL   TAKE 1 TABLET BY MOUTH TWICE DAILY.  Dose: 1 mg     Stimulant Laxative 8.6-50 MG Tabs  Generic drug: senna-docusate   TAKE (2) TABLETS BY MOUTH TWICE DAILY                ACTIVITY:   Normal Activity as Tolerated.    DIET:   Healthy    DISCHARGE INSTRUCTIONS AND FOLLOW UP:  Patient is medically  stable for discharge and will be discharged to his group home [place] [place]    Follow Up: LOUISE Flores.      Cristela Saucedo DO, PGY-1   [facility]

## 2023-11-04 NOTE — PROGRESS NOTES
[facility]        Attending:   Kiana Llamas M.D.    Resident:   Cristela Saucedo D.O.    PATIENT:   Bola Varela; [id number]; 1955    ADMIT DATE: 11/3/2023  1:18 PM    ID:   68 y.o. male with a history of Alzheimer's dementia, COPD/emphysema, chronic pain, chronic leg wound, nicotine dependence who presented from his geriatrician's office on 11/3 for acute bradycardia, found to have acute cystitis.    SUBJECTIVE:   Patient voices no concerns at this time.  He is eating his breakfast, peers comfortable.  He denies any pain or discomfort, particularly denies any chest pain, feeling feverish, shortness of breath, leg pain, difficulty walking around, any pain with urination, frequency, diarrhea, constipation, nausea/vomiting or any other concerns.  He is able to identify himself and knows that he is in a hospital.  Seems to be at baseline neurological status.     OBJECTIVE:  Vitals:    11/04/23 0525 11/04/23 0759 11/04/23 1140 11/04/23 1517   BP: 135/83 (!) 147/90 129/79 (!) 140/85   Pulse: (!) 53 (!) 55 (!) 58 (!) 59   Resp: 18 18 16 16   Temp: 36.4 °C (97.5 °F) 36.3 °C (97.3 °F) 36.3 °C (97.3 °F) 36.5 °C (97.7 °F)   TempSrc: Temporal Temporal Temporal Temporal   SpO2: 94% 95% 94% 96%   Weight: 54.8 kg (120 lb 13 oz)      Height:           Intake/Output Summary (Last 24 hours) at 11/4/2023 1528  Last data filed at 11/4/2023 1000  Gross per 24 hour   Intake 450 ml   Output 0 ml   Net 450 ml       PHYSICAL EXAM:  General: No acute distress, elderly, sitting up in the bed, eating breakfast, voices no concerns, afebrile  HEENT: NC/AT. EOMI.   Cardiovascular: RRR without murmurs. Normal capillary refill  Respiratory: CTAB  Abdomen: soft, nontender, nondistended, no masses  EXT:  GATES, no edema  Skin: Area of old appearing erythema with overlying crusting to the left posterior proximal calf/popliteal region, without drainage, no significant tenderness  Neuro: Oriented to self (baseline),  "identifies that he is in the hospital.  Denies any discomfort or pain.  No facial droop, strength intact in upper and lower extremities bilaterally, pupils symmetric and equal bilaterally    LABS:  Recent Labs     11/03/23  1549   WBC 5.9   RBC 4.47*   HEMOGLOBIN 13.3*   HEMATOCRIT 41.5*   MCV 92.8   MCH 29.8   RDW 46.5   PLATELETCT 272   MPV 10.0   NEUTSPOLYS 66.50   LYMPHOCYTES 23.20   MONOCYTES 8.80   EOSINOPHILS 0.50   BASOPHILS 0.50     Recent Labs     11/03/23  1549   SODIUM 140   POTASSIUM 3.7   CHLORIDE 100   CO2 26   BUN 15   CREATININE 0.71   CALCIUM 9.7   MAGNESIUM 1.8   ALBUMIN 4.3     Estimated GFR/CRCL = Estimated Creatinine Clearance: 76.1 mL/min (by C-G formula based on SCr of 0.71 mg/dL).  Recent Labs     11/03/23  1549   GLUCOSE 93     Recent Labs     11/03/23  1549   ASTSGOT 22   ALTSGPT 27   TBILIRUBIN 0.4   ALKPHOSPHAT 80   GLOBULIN 3.4             No results for input(s): \"INR\", \"APTT\", \"FIBRINOGEN\" in the last 72 hours.    Invalid input(s): \"DIMER\"      IMAGING:  EC-ECHOCARDIOGRAM COMPLETE W/O CONT   Final Result      DX-CHEST-PORTABLE (1 VIEW)   Final Result      No acute cardiac or pulmonary abnormalities are identified.          MEDS:  Current Facility-Administered Medications   Medication Last Admin    nitrofurantoin (Macrobid) capsule 100 mg 100 mg at 11/04/23 1328    senna-docusate (Pericolace Or Senokot S) 8.6-50 MG per tablet 2 Tablet 2 Tablet at 11/04/23 0550    And    polyethylene glycol/lytes (Miralax) PACKET 1 Packet      And    magnesium hydroxide (Milk Of Magnesia) suspension 30 mL      And    bisacodyl (Dulcolax) suppository 10 mg      enoxaparin (Lovenox) inj 40 mg      acetaminophen (Tylenol) tablet 650 mg      risperiDONE (RisperDAL) tablet 1 mg 1 mg at 11/04/23 0551    traZODone (Desyrel) tablet 50 mg 50 mg at 11/03/23 2117    montelukast (Singulair) tablet 10 mg 10 mg at 11/04/23 0551    HYDROcodone-acetaminophen (Norco) 5-325 MG per tablet 0.5 Tablet      gabapentin " (Neurontin) capsule 300 mg 300 mg at 11/04/23 1127    divalproex (Depakote) DR tablet 250 mg 250 mg at 11/04/23 0928    ipratropium (Atrovent) 0.06 % nasal spray 2 Spray         ASSESSMENT/PLAN:    Bradycardia  Patient sent from PCPs office with bradycardia to 44, which is new from baseline.  Differential initially included STEMI/heart block/cardiac etiology, medication induced, hypothyroidism, hypothyroidism, infection.  React work-up negative including troponin, EKG, telemetry monitoring, echocardiogram (EF 65%).  Patient's trazodone dosing was decreased to 50 mg nightly which he takes for his history of Alzheimer's dementia, with improvement of bradycardia into the 50s.  His risperidone was continued on the same dose.  Both of these can cause bradycardia or slow heart rate.  Patient's urine also initially came back showing little concern for infection and just pyuria, urine was repeated this morning and consistent with acute cystitis.  See acute cystitis below.    Plan:  -Continue on telemetry  -TTE showed bicuspid aortic valve without flow abnormalities and ejection fraction 65%  -Low threshold to repeat work-up if patient becomes symptomatic or clinically declines  -Trazodone cut down to 50 mg qday. Bradycardia can be side effect.  Heart rate did improve into the 50s following this.  Would prefer not to adjust medications further related to dementia given history of sundowning as long as patient remains asymptomatic.  Will defer further medication adjustments to his APRN geriatrician.  -Most recent UA consistent with acute cystitis, which may be the cause of his bradycardia though unclear.  Please see below.    Acute cystitis without hematuria  UA upon admission showed leuk esterase and blood, no nitrites or bacteria, consistent with pyuria. UA repeated on the morning of 11/4, showed large amount of leuk esterase, positive for nitrite, 20-50 white cells, and bacteria.  Patient asymptomatic but with history of  "dementia, difficult to discern.  According to chart review, patient has been urinating off the back porch at the group home with concerns for \"urinary symptoms\", of unknown duration. Afebrile, no leukocytosis, pt hemodynamically stable.   -Urine culture positive for Citrobacter Koseri. Discussed with pharmacy, okay to start Macrobid orally for now and await sensitivities, may need to switch to fosfomycin     Alzheimer's dementia  Columbus syndrome  Last brain MRI for Alzheimer's disease in 2021 showed moderate atrophy and white matter changes.  Patient with baseline history of short-term memory loss, wandering, agitation.  Continue home rx. Trazodone tapered to 50 mg qhs out of bradycardia concerns. Risperdal continued, however, also with concerns for bradycardia. 1 to 1 sitter if needed.  -Will have patient follow with geriatrician outpatient for further changes/management     Chronic left leg wound  Patient has a chronic left leg wound to the left posterior calf/popliteal region.  ICD history of SLE back to 2020.  There is no drainage, significant erythema, there is slight crusting but does not appear to bother patient.  Patient afebrile, no leukocytosis.  -Supportive care, continue to monitor    Advanced care planning/counseling discussion  Pt is full code. He is a mireles of the state. As per his group home's understanding, an ethics committee needs to decide to change him to DNR/DNI. He would benefit from this change. Can consider pursuing inpatient. Should be followed up outpatient given his clinical status regarding his dementia.    Other chronic pain  Continue home hydrocodone. Appears that patient is also receiving depakote for pain as well. Continued.    Subacute cough  Has been worked up outpatient. Admission chest x-ray without obvious source. Saturating well on RA. Can trial nasal atrovent as needed.     Emphysema/COPD (HCC)  Unclear when diagnosis was made. Do not have records. Not on controller " medications/inhalers. Currently saturating well on RA. No increased WOB. Will defer further work-up and treatment to day team.      Core Measures:  Fluids: None   Lines: PIV   Abx: Macrobid   Diet: Regular  PPX: Lovenox, SCDs, ambulation     DISPO: Anticipate discharge back to group home tomorrow pending urine culture sensitivities and antibiotic regimen       CODE STATUS: FULL CODE      Cristela Saucedo D.O.  PGY-1  [facility]

## 2023-11-04 NOTE — ED NOTES
Pt able to use urinal multiple times. Sitter effective at redirecting pt when pt has questions, yells out or tries to get out of bed.     Inpt bed assigned. Preparing for transfer.

## 2023-11-04 NOTE — DIETARY
"Nutrition services: Day 1 of admit.  Bola Varela is a 68 y.o. male with admitting DX of bradycardia.    Consult received for BMI <19. Met with pt at bedside. Pt was sitting up in bed, he appeared thin. Severe fat loss evidenced by loose facial skin, hollowed buccal region, depressed orbitals; severe muscle loss evidenced by prominent brow bone, squared shoulders and prominent clavicles. Pt reports a low appetite, he stated he wasn't eating well prior to admit, stating he \"didn't have a home\". Of note, pt with late stage dementia. He was agreeable to Boost Plus in chocolate. RD will add supplement order and adjust menu accordingly.     Assessment:  Height: 177.8 cm (5' 10\")  Weight: 54.8 kg (120 lb 13 oz)  Body mass index is 17.33 kg/m²., BMI classification: underweight  Diet/Intake: Regular; PO % for one meal thus far    Evaluation:   Sent by MD for bradycardia.  PMH: late stage dementia, essential hypertension, restless leg, tobacco use.  Pt resides in a group home.   Wt hx per chart review: 138 lbs 3/13/23. Wt loss of 13% in seven months is severe.     Malnutrition Risk: Pt with severe, chronic malnutrition related to late stage dementia, as evidenced by physical markers for severe fat and severe muscle loss as noted above, and severe wt loss of 13% in seven months.     Recommendations/Plan:  Boost Plus TID in chocolate  Encourage intake of meals and supplements.  Document intake of all meals and supplements as % taken in ADLs to provide interdisciplinary communication across all shifts.   Monitor weight.  Nutrition rep will continue to see patient for ongoing meal and snack preferences.     RD will follow per dept guidelines.     "

## 2023-11-04 NOTE — DISCHARGE PLANNING
Case Management Discharge Planning    Admission Date: 11/3/2023  GMLOS: 1.8  ALOS: 1    6-Clicks ADL Score: 24  6-Clicks Mobility Score: 19      Anticipated Discharge Dispo:      DME Needed: No    Action(s) Taken: LMSW spoke with Reza from pt's  [phone]. Reza and his  will come pick pt up this evening around 1800. They will call unit before picking pt up.     Escalations Completed: None    Medically Clear: Yes    Next Steps: LMSW to follow as needed.     Barriers to Discharge: Transportation    Is the patient up for discharge tomorrow: No

## 2023-11-04 NOTE — ASSESSMENT & PLAN NOTE
Has been worked up outpatient. Admission chest x-ray without obvious source. Saturating well on RA. Can trial nasal atrovent.

## 2023-11-04 NOTE — ED NOTES
Med Rec updated and complete per staff from [facility]  Allergies Reviewed  Antibiotcs in past 30 days:NO  Anticoagulant in past 14 days:NO    Pt is from [facility]

## 2023-11-04 NOTE — PROGRESS NOTES
4 Eyes Skin Assessment Completed by EKATERINA Pool and LETTY Morillo.    Head WDL  Ears WDL  Nose WDL  Mouth WDL  Neck WDL  Breast/Chest WDL  Shoulder Blades WDL  Spine WDL  (R) Arm/Elbow/Hand WDL  (L) Arm/Elbow/Hand WDL  Abdomen WDL  Groin WDL  Scrotum/Coccyx/Buttocks Redness and Blanching  (R) Leg WDL  (L) Leg Scab  (R) Heel/Foot/Toe WDL  (L) Heel/Foot/Toe WDL          Devices In Places Tele Box      Interventions In Place N/A    Possible Skin Injury No    Pictures Uploaded Into Epic N/A  Wound Consult Placed N/A  RN Wound Prevention Protocol Ordered No

## 2023-11-04 NOTE — ED NOTES
Bedside report received from off going RN/tech: Henrry, assumed care of patient.  POC discussed with patient. Call light within reach, all needs addressed at this time.       Fall risk interventions in place: Patient's personal possessions are with in their safe reach, Place socks on patient, Place fall risk sign on patient's door, Give patient urinal if applicable, Keep floor surfaces clean and dry, and Human-sitter if tele-sitter fails (all applicable per [facility] Fall risk assessment)   Continuous monitoring: Cardiac Leads, Pulse Ox, or Blood Pressure  IVF/IV medications: Not Applicable   Oxygen: Room Air  Bedside sitter: Report given to sitter  Isolation: Not Applicable

## 2023-11-04 NOTE — ED NOTES
Report given to the floor nurse Jeanine. Pt transport to the floor by Tele Tech. Justus accompanied the patient to the floor.

## 2023-11-04 NOTE — ASSESSMENT & PLAN NOTE
Continue home hydrocodone. Appears that patient is also receiving depakote for pain as well. Continued.

## 2023-11-05 ENCOUNTER — PHARMACY VISIT (OUTPATIENT)
Dept: PHARMACY | Facility: MEDICAL CENTER | Age: 68
End: 2023-11-05
Payer: MEDICARE

## 2023-11-05 VITALS
DIASTOLIC BLOOD PRESSURE: 84 MMHG | WEIGHT: 120.81 LBS | OXYGEN SATURATION: 94 % | BODY MASS INDEX: 17.3 KG/M2 | HEART RATE: 56 BPM | HEIGHT: 70 IN | TEMPERATURE: 97.5 F | RESPIRATION RATE: 16 BRPM | SYSTOLIC BLOOD PRESSURE: 139 MMHG

## 2023-11-05 LAB
ANION GAP SERPL CALC-SCNC: 14 MMOL/L (ref 7–16)
BACTERIA UR CULT: ABNORMAL
BACTERIA UR CULT: ABNORMAL
BUN SERPL-MCNC: 17 MG/DL (ref 8–22)
CALCIUM SERPL-MCNC: 8.8 MG/DL (ref 8.5–10.5)
CHLORIDE SERPL-SCNC: 104 MMOL/L (ref 96–112)
CO2 SERPL-SCNC: 23 MMOL/L (ref 20–33)
CREAT SERPL-MCNC: 0.78 MG/DL (ref 0.5–1.4)
ERYTHROCYTE [DISTWIDTH] IN BLOOD BY AUTOMATED COUNT: 46.5 FL (ref 35.9–50)
GFR SERPLBLD CREATININE-BSD FMLA CKD-EPI: 96 ML/MIN/1.73 M 2
GLUCOSE SERPL-MCNC: 72 MG/DL (ref 65–99)
HCT VFR BLD AUTO: 39.2 % (ref 42–52)
HGB BLD-MCNC: 12.7 G/DL (ref 14–18)
MCH RBC QN AUTO: 29.6 PG (ref 27–33)
MCHC RBC AUTO-ENTMCNC: 32.4 G/DL (ref 32.3–36.5)
MCV RBC AUTO: 91.4 FL (ref 81.4–97.8)
PLATELET # BLD AUTO: 277 K/UL (ref 164–446)
PMV BLD AUTO: 10.4 FL (ref 9–12.9)
POTASSIUM SERPL-SCNC: 4.1 MMOL/L (ref 3.6–5.5)
RBC # BLD AUTO: 4.29 M/UL (ref 4.7–6.1)
SIGNIFICANT IND 70042: ABNORMAL
SITE SITE: ABNORMAL
SODIUM SERPL-SCNC: 141 MMOL/L (ref 135–145)
SOURCE SOURCE: ABNORMAL
WBC # BLD AUTO: 6.1 K/UL (ref 4.8–10.8)

## 2023-11-05 PROCEDURE — 99238 HOSP IP/OBS DSCHRG MGMT 30/<: CPT | Mod: GC | Performed by: FAMILY MEDICINE

## 2023-11-05 PROCEDURE — A9270 NON-COVERED ITEM OR SERVICE: HCPCS

## 2023-11-05 PROCEDURE — 80048 BASIC METABOLIC PNL TOTAL CA: CPT

## 2023-11-05 PROCEDURE — 36415 COLL VENOUS BLD VENIPUNCTURE: CPT

## 2023-11-05 PROCEDURE — 700102 HCHG RX REV CODE 250 W/ 637 OVERRIDE(OP)

## 2023-11-05 PROCEDURE — 85027 COMPLETE CBC AUTOMATED: CPT

## 2023-11-05 RX ADMIN — DIVALPROEX SODIUM 250 MG: 250 TABLET, DELAYED RELEASE ORAL at 08:15

## 2023-11-05 RX ADMIN — RISPERIDONE 1 MG: 1 TABLET ORAL at 05:19

## 2023-11-05 RX ADMIN — DOCUSATE SODIUM 50 MG AND SENNOSIDES 8.6 MG 2 TABLET: 8.6; 5 TABLET, FILM COATED ORAL at 05:18

## 2023-11-05 RX ADMIN — GABAPENTIN 300 MG: 300 CAPSULE ORAL at 05:18

## 2023-11-05 RX ADMIN — GABAPENTIN 300 MG: 300 CAPSULE ORAL at 13:09

## 2023-11-05 RX ADMIN — NITROFURANTOIN MONOHYDRATE/MACROCRYSTALLINE 100 MG: 25; 75 CAPSULE ORAL at 08:15

## 2023-11-05 RX ADMIN — MONTELUKAST 10 MG: 10 TABLET, FILM COATED ORAL at 05:18

## 2023-11-05 RX ADMIN — ACETAMINOPHEN 650 MG: 325 TABLET, FILM COATED ORAL at 05:18

## 2023-11-05 NOTE — CARE PLAN
The patient is Stable - Low risk of patient condition declining or worsening    Shift Goals  Clinical Goals: free from injury  Patient Goals: comfort, rest  Family Goals: RONNI    Progress made toward(s) clinical / shift goals:  yes      Problem: Psychosocial  Goal: Patient's level of anxiety will decrease  Outcome: Progressing     Patient is not progressing towards the following goals:

## 2023-11-05 NOTE — DISCHARGE PLANNING
Patient to transfer to his group home today at 1600 via [facility]. Spoke with Kera at [facility], states patient has no transport benefits. ALECIA Duffy advised

## 2023-11-05 NOTE — DISCHARGE PLANNING
Case Management Discharge Planning    Admission Date: 11/3/2023  GMLOS: 2.9  ALOS: 2    6-Clicks ADL Score: 24  6-Clicks Mobility Score: 19      Anticipated Discharge Dispo:      DME Needed: No    Action(s) Taken: LSW notified that pt is medically clear to DC this afternoon. LSW made phone call to [facility] , [place] [phone]. Peterson reported they are not able to arrange transportation for today. Peterson confirmed they are able to accept pt back at any time. Informed Peterson that transport will be requested for 1600. Care team notified and transportation forms faxed to [facility].    LSW made phone call to pt's guardian Coretta Grady and left VM.    Addendum @7370  LSW attempted to call second number listed for Coretta, no answer. LSW made phone call to on-call guardian [phone] and spoke with Sonya. Sonya confirmed they have no objection to pt returning to his  today and requested the DC summary be faxed to [phone].    Addendum @1379  DC summary faxed to [facility] Guardian.    Escalations Completed: None    Medically Clear: Yes    Next Steps: No CM needs identified at this time    Barriers to Discharge: None    Is the patient up for discharge tomorrow: No-anticipated to DC back to [facility] today at 1600

## 2023-11-06 NOTE — CARE PLAN
Patient's mom called asking she could get a copy of her child's immunization records mailed to the address listed on file. If not able to be mailed, she can also pick it up at the clinic. She can be reached at [phone]   Pt medically cleared to return to group home per MD order. Pt is pleasantly confused/cooperative. A&O to self. VSS on RA. Denies pain. OOB with [facility] FWW. AVS printed, 2RN sign off. IV removed. [facility] to transport pt to group home. No further questions/concerns at this time.     Problem: Psychosocial  Goal: Patient's level of anxiety will decrease  Outcome: Met  Goal: Patient's ability to verbalize feelings about condition will improve  Outcome: Met  Goal: Patient's ability to re-evaluate and adapt role responsibilities will improve  Outcome: Met  Goal: Patient and family will demonstrate ability to cope with life altering diagnosis and/or procedure  Outcome: Met  Goal: Spiritual and cultural needs incorporated into hospitalization  Outcome: Met     Problem: Communication  Goal: The ability to communicate needs accurately and effectively will improve  Outcome: Met     Problem: Hemodynamics  Goal: Patient's hemodynamics, fluid balance and neurologic status will be stable or improve  Outcome: Met     Problem: Mobility  Goal: Patient's capacity to carry out activities will improve  Outcome: Met

## 2023-11-07 NOTE — DOCUMENTATION QUERY
[facility]                                                                       Query Response Note      PATIENT:               GRUPO GANN  ACCT #:                  [id number]  MRN:                     [id number]  :                      1955  ADMIT DATE:       11/3/2023 1:18 PM  DISCH DATE:        2023 4:35 PM  RESPONDING  PROVIDER #:        [id number]           QUERY TEXT:    Per RD evaluation on , patient meets ASPEN criteria for severe chronic malnutrition AEB severe fat and muscle loss and severe weight loss of 13% in seven months.  Treatment includes RD consult, Boost plus TID, monitor weight and intake.      Based upon your judgment and the above clinical indicators, please select the most appropriate diagnosis for the findings.      The patient's clinical indicators include:  69 yo M admitted with bradycardia and acute cystitis      Clinical Indicators: American Society for Parenteral and Enteral Nutrition (ASPEN) criteria (presence of at least two)  Per dietary consult dated :     -  severe fat loss evidenced by loose facial skin, hollowed buccal region, depressed orbital   -  severe muscle loss evidenced by prominent brow bone, squared  shoulders and prominent clavicles.  -  severe wt loss of 13% in seven months       Additional documentation per dietary consult:  -  BMI 17.33; classification Underweight      Risk Factors; Late stage Alzheimer's dementia, tobacco use, low appetite, resides in group home    Treatment: RD consult; document oral intake; monitor wt.; nutrition rep;  Boost Plus TID       Contact me with any questions.    Thank you for your time and attention,  Marlin Barbour RN, [facility]  [email or web address]  Connect via email, Voalte or messenger.  Options provided:   -- Severe malnutrition   -- Moderate malnutrition   -- Mild malnutrition   -- Insignificant finding/no effect on patient stay and  treatment      Query created by: Marlin Barbour on 11/6/2023 2:21 PM    RESPONSE TEXT:    Severe malnutrition          Electronically signed by:  RIO SHELBY MD 11/7/2023 2:56 PM

## 2023-11-23 NOTE — DISCHARGE PLANNING
Meds-to-Beds: Discharge prescription orders listed below delivered to patient's bedside EKATERINA Brizuela. Patient counseled. Per RN, case management stated patient elected to have co-payment billed to patient account.    Methocarbamol not covered and patient was prescribed cyclobenzaprine. Per pharmacy, provider was contacted and requested only cyclobenzaprine to be dispensed.         NithyaanselmoBola Spencer   Home Medication Instructions JACE:[id number]    Printed on:05/03/21 6750   Medication Information                      amLODIPine (NORVASC) 5 MG Tab  Take 1 tablet by mouth every day.             cyclobenzaprine (FLEXERIL) 10 mg Tab  Take 1 tablet by mouth 3 times a day as needed for Muscle Spasms.             divalproex (DEPAKOTE SPRINKLE) 125 MG Capsule Delayed Release Sprinkle  Take 1 capsule by mouth every 8 hours.             gabapentin (NEURONTIN) 300 MG Cap  Take 1 capsule by mouth 3 times a day.             hydrOXYzine HCl (ATARAX) 25 MG Tab  Take 1 tablet by mouth 3 times a day as needed for Itching or Anxiety.             lidocaine (LIDODERM) 5 % Patch  Place 1 Patch on the skin every 24 hours.             morphine ER (MS CONTIN) 15 MG Tab CR tablet  Take 1 tablet by mouth every 12 hours for 30 days.             oxyCODONE immediate-release (ROXICODONE) 5 MG Tab  Take 1 tablet by mouth every 6 hours as needed for up to 5 days.             risperiDONE (RISPERDAL) 0.5 MG Tab  Take 1 tablet by mouth every day.             risperiDONE (RISPERDAL) 1 MG Tab  Take 1 tablet by mouth every evening.             senna-docusate (PERICOLACE OR SENOKOT S) 8.6-50 MG Tab  Take 2 Tablets by mouth 2 times a day.             traZODone (DESYREL) 50 MG Tab  Take 1 tablet by mouth at bedtime.               Sapna Hargrove, PharmD     [[id number]]

## 2024-01-01 ENCOUNTER — APPOINTMENT (OUTPATIENT)
Dept: RADIOLOGY | Facility: MEDICAL CENTER | Age: 69
End: 2024-01-01
Attending: EMERGENCY MEDICINE
Payer: MEDICARE

## 2024-01-01 ENCOUNTER — HOSPITAL ENCOUNTER (EMERGENCY)
Facility: MEDICAL CENTER | Age: 69
End: 2024-05-11
Attending: EMERGENCY MEDICINE
Payer: MEDICARE

## 2024-01-01 ENCOUNTER — HOSPITAL ENCOUNTER (EMERGENCY)
Facility: MEDICAL CENTER | Age: 69
End: 2024-05-09
Attending: EMERGENCY MEDICINE
Payer: MEDICARE

## 2024-01-01 ENCOUNTER — APPOINTMENT (OUTPATIENT)
Dept: URGENT CARE | Facility: PHYSICIAN GROUP | Age: 69
End: 2024-01-01
Payer: MEDICARE

## 2024-01-01 ENCOUNTER — PHARMACY VISIT (OUTPATIENT)
Dept: PHARMACY | Facility: MEDICAL CENTER | Age: 69
End: 2024-01-01
Payer: COMMERCIAL

## 2024-01-01 VITALS
SYSTOLIC BLOOD PRESSURE: 136 MMHG | HEIGHT: 70 IN | HEART RATE: 54 BPM | BODY MASS INDEX: 17.18 KG/M2 | DIASTOLIC BLOOD PRESSURE: 63 MMHG | OXYGEN SATURATION: 98 % | TEMPERATURE: 97.5 F | WEIGHT: 120 LBS | RESPIRATION RATE: 17 BRPM

## 2024-01-01 VITALS
DIASTOLIC BLOOD PRESSURE: 76 MMHG | TEMPERATURE: 98 F | WEIGHT: 120 LBS | HEART RATE: 70 BPM | SYSTOLIC BLOOD PRESSURE: 178 MMHG | HEIGHT: 70 IN | RESPIRATION RATE: 18 BRPM | BODY MASS INDEX: 17.18 KG/M2 | OXYGEN SATURATION: 91 %

## 2024-01-01 DIAGNOSIS — S02.40FB OPEN FRACTURE OF LEFT ZYGOMATIC ARCH, INITIAL ENCOUNTER (HCC): ICD-10-CM

## 2024-01-01 DIAGNOSIS — F02.80 ALZHEIMER'S DEMENTIA, UNSPECIFIED DEMENTIA SEVERITY, UNSPECIFIED TIMING OF DEMENTIA ONSET, UNSPECIFIED WHETHER BEHAVIORAL, PSYCHOTIC, OR MOOD DISTURBANCE OR ANXIETY (HCC): ICD-10-CM

## 2024-01-01 DIAGNOSIS — B49 FUNGAL INFECTION: ICD-10-CM

## 2024-01-01 DIAGNOSIS — S09.90XA CLOSED HEAD INJURY, INITIAL ENCOUNTER: ICD-10-CM

## 2024-01-01 DIAGNOSIS — S01.01XA LACERATION OF SCALP, INITIAL ENCOUNTER: ICD-10-CM

## 2024-01-01 DIAGNOSIS — Z51.5 HOSPICE CARE PATIENT: ICD-10-CM

## 2024-01-01 DIAGNOSIS — S00.83XA TRAUMATIC HEMATOMA OF FOREHEAD, INITIAL ENCOUNTER: ICD-10-CM

## 2024-01-01 DIAGNOSIS — F03.918 DEMENTIA WITH BEHAVIORAL DISTURBANCE (HCC): ICD-10-CM

## 2024-01-01 DIAGNOSIS — G30.9 ALZHEIMER'S DEMENTIA, UNSPECIFIED DEMENTIA SEVERITY, UNSPECIFIED TIMING OF DEMENTIA ONSET, UNSPECIFIED WHETHER BEHAVIORAL, PSYCHOTIC, OR MOOD DISTURBANCE OR ANXIETY (HCC): ICD-10-CM

## 2024-01-01 DIAGNOSIS — N30.00 ACUTE CYSTITIS WITHOUT HEMATURIA: ICD-10-CM

## 2024-01-01 DIAGNOSIS — S01.81XA FACIAL LACERATION, INITIAL ENCOUNTER: ICD-10-CM

## 2024-01-01 DIAGNOSIS — S00.83XA CONTUSION OF FACE, INITIAL ENCOUNTER: ICD-10-CM

## 2024-01-01 LAB
ALBUMIN SERPL BCP-MCNC: 3.6 G/DL (ref 3.2–4.9)
ALBUMIN/GLOB SERPL: 1.7 G/DL
ALP SERPL-CCNC: 58 U/L (ref 30–99)
ALT SERPL-CCNC: 15 U/L (ref 2–50)
ANION GAP SERPL CALC-SCNC: 9 MMOL/L (ref 7–16)
APPEARANCE UR: CLEAR
APTT PPP: 37.8 SEC (ref 24.7–36)
AST SERPL-CCNC: 25 U/L (ref 12–45)
BACTERIA #/AREA URNS HPF: NEGATIVE /HPF
BACTERIA UR CULT: ABNORMAL
BACTERIA UR CULT: ABNORMAL
BASOPHILS # BLD AUTO: 0.7 % (ref 0–1.8)
BASOPHILS # BLD: 0.03 K/UL (ref 0–0.12)
BILIRUB SERPL-MCNC: 0.2 MG/DL (ref 0.1–1.5)
BILIRUB UR QL STRIP.AUTO: NEGATIVE
BUN SERPL-MCNC: 12 MG/DL (ref 8–22)
CALCIUM ALBUM COR SERPL-MCNC: 8.7 MG/DL (ref 8.5–10.5)
CALCIUM SERPL-MCNC: 8.4 MG/DL (ref 8.5–10.5)
CHLORIDE SERPL-SCNC: 107 MMOL/L (ref 96–112)
CO2 SERPL-SCNC: 27 MMOL/L (ref 20–33)
COLOR UR: YELLOW
CREAT SERPL-MCNC: 0.67 MG/DL (ref 0.5–1.4)
EOSINOPHIL # BLD AUTO: 0.04 K/UL (ref 0–0.51)
EOSINOPHIL NFR BLD: 1 % (ref 0–6.9)
EPI CELLS #/AREA URNS HPF: NEGATIVE /HPF
ERYTHROCYTE [DISTWIDTH] IN BLOOD BY AUTOMATED COUNT: 49.7 FL (ref 35.9–50)
GFR SERPLBLD CREATININE-BSD FMLA CKD-EPI: 101 ML/MIN/1.73 M 2
GLOBULIN SER CALC-MCNC: 2.1 G/DL (ref 1.9–3.5)
GLUCOSE SERPL-MCNC: 110 MG/DL (ref 65–99)
GLUCOSE UR STRIP.AUTO-MCNC: NEGATIVE MG/DL
HCT VFR BLD AUTO: 27.7 % (ref 42–52)
HGB BLD-MCNC: 8.7 G/DL (ref 14–18)
HYALINE CASTS #/AREA URNS LPF: ABNORMAL /LPF
IMM GRANULOCYTES # BLD AUTO: 0.01 K/UL (ref 0–0.11)
IMM GRANULOCYTES NFR BLD AUTO: 0.2 % (ref 0–0.9)
INR PPP: 1.09 (ref 0.87–1.13)
KETONES UR STRIP.AUTO-MCNC: NEGATIVE MG/DL
LEUKOCYTE ESTERASE UR QL STRIP.AUTO: ABNORMAL
LYMPHOCYTES # BLD AUTO: 0.93 K/UL (ref 1–4.8)
LYMPHOCYTES NFR BLD: 23.1 % (ref 22–41)
MCH RBC QN AUTO: 29.9 PG (ref 27–33)
MCHC RBC AUTO-ENTMCNC: 31.4 G/DL (ref 32.3–36.5)
MCV RBC AUTO: 95.2 FL (ref 81.4–97.8)
MICRO URNS: ABNORMAL
MONOCYTES # BLD AUTO: 0.54 K/UL (ref 0–0.85)
MONOCYTES NFR BLD AUTO: 13.4 % (ref 0–13.4)
NEUTROPHILS # BLD AUTO: 2.47 K/UL (ref 1.82–7.42)
NEUTROPHILS NFR BLD: 61.6 % (ref 44–72)
NITRITE UR QL STRIP.AUTO: POSITIVE
NRBC # BLD AUTO: 0 K/UL
NRBC BLD-RTO: 0 /100 WBC (ref 0–0.2)
OTHER ELEMENTS URNS MICRO: ABNORMAL
PH UR STRIP.AUTO: 6.5 [PH] (ref 5–8)
PLATELET # BLD AUTO: 179 K/UL (ref 164–446)
PMV BLD AUTO: 10.2 FL (ref 9–12.9)
POTASSIUM SERPL-SCNC: 3.3 MMOL/L (ref 3.6–5.5)
PROT SERPL-MCNC: 5.7 G/DL (ref 6–8.2)
PROT UR QL STRIP: NEGATIVE MG/DL
PROTHROMBIN TIME: 14.2 SEC (ref 12–14.6)
RBC # BLD AUTO: 2.91 M/UL (ref 4.7–6.1)
RBC # URNS HPF: ABNORMAL /HPF
RBC UR QL AUTO: NEGATIVE
SIGNIFICANT IND 70042: ABNORMAL
SITE SITE: ABNORMAL
SODIUM SERPL-SCNC: 143 MMOL/L (ref 135–145)
SOURCE SOURCE: ABNORMAL
SP GR UR STRIP.AUTO: 1.01
UROBILINOGEN UR STRIP.AUTO-MCNC: 0.2 MG/DL
WBC # BLD AUTO: 4 K/UL (ref 4.8–10.8)
WBC #/AREA URNS HPF: ABNORMAL /HPF

## 2024-01-01 PROCEDURE — RXMED WILLOW AMBULATORY MEDICATION CHARGE: Performed by: EMERGENCY MEDICINE

## 2024-01-01 RX ORDER — FLUCONAZOLE 200 MG/1
200 TABLET ORAL DAILY
Qty: 14 TABLET | Refills: 0 | Status: ACTIVE | OUTPATIENT
Start: 2024-01-01 | End: 2024-01-01

## 2024-01-01 RX ORDER — RISPERIDONE 1 MG/1
2 TABLET ORAL 2 TIMES DAILY
Qty: 120 TABLET | Refills: 0 | Status: SHIPPED | OUTPATIENT
Start: 2024-01-01 | End: 2024-01-01

## 2024-01-01 RX ORDER — HALOPERIDOL 5 MG/ML
2 INJECTION INTRAMUSCULAR ONCE
Status: COMPLETED | OUTPATIENT
Start: 2024-01-01 | End: 2024-01-01

## 2024-01-01 RX ORDER — CEPHALEXIN 500 MG/1
500 CAPSULE ORAL ONCE
Status: COMPLETED | OUTPATIENT
Start: 2024-01-01 | End: 2024-01-01

## 2024-01-01 RX ORDER — HYDROCODONE BITARTRATE AND ACETAMINOPHEN 5; 325 MG/1; MG/1
0.5 TABLET ORAL 3 TIMES DAILY
COMMUNITY

## 2024-01-01 RX ORDER — RISPERIDONE 2 MG/1
2 TABLET ORAL 2 TIMES DAILY
Qty: 60 TABLET | Refills: 0 | Status: SHIPPED | OUTPATIENT
Start: 2024-01-01 | End: 2024-06-10

## 2024-01-01 RX ORDER — CEPHALEXIN 500 MG/1
500 CAPSULE ORAL 4 TIMES DAILY
Qty: 28 CAPSULE | Refills: 0 | Status: ACTIVE | OUTPATIENT
Start: 2024-01-01 | End: 2024-01-01

## 2024-01-01 RX ORDER — LIDOCAINE HYDROCHLORIDE AND EPINEPHRINE BITARTRATE 20; .01 MG/ML; MG/ML
10 INJECTION, SOLUTION SUBCUTANEOUS ONCE
Status: COMPLETED | OUTPATIENT
Start: 2024-01-01 | End: 2024-01-01

## 2024-01-01 RX ORDER — FLUCONAZOLE 100 MG/1
200 TABLET ORAL ONCE
Status: COMPLETED | OUTPATIENT
Start: 2024-01-01 | End: 2024-01-01

## 2024-01-01 RX ADMIN — CLOSTRIDIUM TETANI TOXOID ANTIGEN (FORMALDEHYDE INACTIVATED), CORYNEBACTERIUM DIPHTHERIAE TOXOID ANTIGEN (FORMALDEHYDE INACTIVATED), BORDETELLA PERTUSSIS TOXOID ANTIGEN (GLUTARALDEHYDE INACTIVATED), BORDETELLA PERTUSSIS FILAMENTOUS HEMAGGLUTININ ANTIGEN (FORMALDEHYDE INACTIVATED), BORDETELLA PERTUSSIS PERTACTIN ANTIGEN, AND BORDETELLA PERTUSSIS FIMBRIAE 2/3 ANTIGEN 0.5 ML: 5; 2; 2.5; 5; 3; 5 INJECTION, SUSPENSION INTRAMUSCULAR at 12:05

## 2024-01-01 RX ADMIN — LIDOCAINE HYDROCHLORIDE,EPINEPHRINE BITARTRATE 10 ML: 20; .01 INJECTION, SOLUTION INFILTRATION; PERINEURAL at 11:15

## 2024-01-01 RX ADMIN — CEPHALEXIN 500 MG: 500 CAPSULE ORAL at 13:34

## 2024-01-01 RX ADMIN — HALOPERIDOL LACTATE 2 MG: 5 INJECTION, SOLUTION INTRAMUSCULAR at 15:31

## 2024-01-01 RX ADMIN — FLUCONAZOLE 200 MG: 100 TABLET ORAL at 18:55

## 2024-01-01 ASSESSMENT — FIBROSIS 4 INDEX
FIB4 SCORE: 1.05
FIB4 SCORE: 1.05

## 2024-01-04 NOTE — PROGRESS NOTES
Attempted to give patient morning medications around 06:00 am, however patient refused to take the medications.    CMP, CBC

## 2024-02-16 PROBLEM — R62.7 FTT (FAILURE TO THRIVE) IN ADULT: Status: ACTIVE | Noted: 2024-02-16

## 2024-02-28 NOTE — HOSPICE
Hospice referral placed. Call placed to Coretta Grady ([facility]) . Message left to ensure patient has been placed with a hospice company.   [facility] number left

## 2024-05-06 PROBLEM — Z51.5 HOSPICE CARE: Status: ACTIVE | Noted: 2023-01-01
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
## 2024-05-09 NOTE — DISCHARGE INSTRUCTIONS
Keep wound clean and dry.  Watch for signs of infection.  Antibiotics as prescribed.  Return for signs of infection including redness, pain, swelling, drainage, or increasing swelling.  Sutures out in 7 to 10 days here or his doctor.  Return for any new medical problems or concerns  
Statement Selected

## 2024-05-09 NOTE — ED NOTES
Pt and care giver provided discharge instructions and follow-up information. Pt/care giver verbalized understanding and all questions answered. Pt escorted from ED in  with RN and caregiver. Pt transported back to  with caregiver.
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
## 2024-05-11 NOTE — ED NOTES
Patient repeatedly hitting self on the head, yelling and demanding to go home and attempting to get out of bed. Lap belt applied. ERP at bedside. Will order haldol.     1:1 sitter requested from charge RN.
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
## 2024-05-11 NOTE — ED NOTES
This RN noticed patient to be hitting himself when frustrated about unable to answer some orientation question.

## 2024-05-11 NOTE — LETTER
5/15/2024               Bola Spencer Avery  [address] [address]        Dear Bola (MR#[id number])    This letter is sent in regards to your recent visit to the [facility] Emergency Department on 5/11/2024. During the visit, tests were performed to assist the physician in your medical diagnosis. A review of your tests requires that we notify you of the following:    Your urine culture was POSITIVE for a bacteria called Citrobacter koseri. The antibiotic prescribed for you (cephalexin) should be active to treat this bacteria. It is important that you continue taking your antibiotic until it is finished.     Please feel free to contact me at the number below if you have any questions or concerns. Thank you for your cooperation in the matter.    Sincerely,  ED Culture Follow-Up Staff  Juan Gomez, PharmD    Formerly [facility], Emergency Department  [address] [place] [id number] [phone] (ED Culture Line)

## 2024-05-12 NOTE — ED NOTES
Patient resting calmly on gurney.  Active chest rise noted.  1:1 sitter in direct view of patient.

## 2024-05-12 NOTE — ED NOTES
Med rec  updated and complete. Allergies reviewed. Per ALEM from [facility] ( [phone]).  Pt was given a prescription for Cephalexin on 05/09/24. Pt has received no  doses at the [facility].    No anticoagulant or antiplatelet medications  noted.      Home pharmacy  [facility]  [phone]

## 2024-05-12 NOTE — ED NOTES
DEBBIE at bedside. provided discharge instructions and med to beds. Patient leaving ER in stable condition to group home. IV removed.

## 2024-05-12 NOTE — DISCHARGE PLANNING
TOYA asked to assist with discharge of Pt back to [facility] home.  TOYA placed call to Lucia GOMES and confirmed that she is in agreement with Pt returning to the . TOYA then placed call to [facility]   and confirmed that they are in agreement with Pt returning to .  Peterson requested to have antibiotics sent with Pt if possible. ERP updated and medication picked up by TOYA from [facility] pharmacy.      TOYA completed [facility] PCS and faxed all required documentation to [facility]. Per [employer] they are able to accommodate the trip at 2040.  TOYA called and update Peterson regarding Pt discharge time.     TOYA faxed ED provider note to [facility] office ([phone]) per Lucia's request.

## 2024-05-12 NOTE — ED NOTES
Urine incontinence care done. Patient more agreeable to removing bilateral mitts. Verbal agreement made not to keep hitting self. 1:1 sitter in direct view of patient.

## 2024-05-14 PROBLEM — S01.81XA FACE LACERATIONS: Status: ACTIVE | Noted: 2024-01-01
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
## 2024-05-18 PROBLEM — M32.9 SLE (SYSTEMIC LUPUS ERYTHEMATOSUS) (HCC): Status: ACTIVE | Noted: 2024-01-01

## 2024-08-08 NOTE — ED NOTES
-- DO NOT REPLY / DO NOT REPLY ALL --  -- This inbox is not monitored. If this was sent to the wrong provider or department, reroute message to P ECO Reroute pool. --  -- Message is from Engagement Center Operations (ECO) --    General Patient Message: Called to get an update in regards to the Disability Paperwork.  Caller Information         Type Contact Phone/Fax    08/05/2024 09:20 AM CDT Phone (Incoming) Loreto Bush (Self) [phone] (M)    08/08/2024 12:55 PM CDT Phone (Incoming) Loreto Bush (Self) [phone] (M)            Alternative phone number: none    Can a detailed message be left? Yes - Voicemail      Patient has been advised the message will be addressed within 2-3 business days.             Labs at bedside

## 2025-02-28 NOTE — ED NOTES
NURSING ADMISSION NOTE      Patient admitted via Cart  Oriented to room.  Safety precautions initiated.  Bed in low position.  Call light in reach.    Pt A&O1-2 forgetful  Room Air  Resting in bed, No Distress Noted  Purewick in place  Reg Diet  Isolation Precaution   Blood Culture pending  Safety precaution maintained  Plan of Care Ongoing      PTA MED list Not Completed, pt doesn't know Mult Meds she takes/doses.    Patient for discharge. TOYA Atkins notified.

## (undated) DEVICE — GOWN SURGEONS X-LARGE - DISP. (30/CA)

## (undated) DEVICE — STAPLER SKIN DISP - (6/BX 10BX/CA) VISISTAT

## (undated) DEVICE — BANDAGE ELASTIC 6 HONEYCOMB - 6X5YD LF (20/CA)"

## (undated) DEVICE — GLOVE SZ 7.5 BIOGEL PI MICRO - PF LF (50PR/BX)

## (undated) DEVICE — SENSOR SPO2 NEO LNCS ADHESIVE (20/BX) SEE USER NOTES

## (undated) DEVICE — PADDING CAST 6 IN STERILE - 6 X 4 YDS (24/CA)

## (undated) DEVICE — GLOVE SZ 6.5 BIOGEL PI MICRO - PF LF (50PR/BX)

## (undated) DEVICE — GLOVE BIOGEL PI INDICATOR SZ 8.0 SURGICAL PF LF -(50/BX 4BX/CA)

## (undated) DEVICE — DRESSING KIT V.A.C. SENSA T.R.A.C. LARGE (10EA/CA)

## (undated) DEVICE — SUTURE GENERAL

## (undated) DEVICE — SET LEADWIRE 5 LEAD BEDSIDE DISPOSABLE ECG (1SET OF 5/EA)

## (undated) DEVICE — SODIUM CHL IRRIGATION 0.9% 1000ML (12EA/CA)

## (undated) DEVICE — BLADE SURGICAL CLIPPER - (50EA/CA)

## (undated) DEVICE — SUTURE 3-0 ETHILON PS-1 (36PK/BX)

## (undated) DEVICE — GLOVE BIOGEL SZ 8 SURGICAL PF LTX - (50PR/BX 4BX/CA)

## (undated) DEVICE — HEADREST PRONEVIEW LARGE - (10/CA)

## (undated) DEVICE — SLEEVE STERILE  A599T - 30/BX 2BX/CS

## (undated) DEVICE — CHLORAPREP 26 ML APPLICATOR - ORANGE TINT(25/CA)

## (undated) DEVICE — BANDAGE ELASTIC 4 HONEYCOMB - 4"X5YD LF (20/CA)"

## (undated) DEVICE — SOD. CHL. INJ. 0.9% 1000 ML - (14EA/CA 60CA/PF)

## (undated) DEVICE — DRAPE IOBAN II INCISE 23X17 - (10EA/BX 4BX/CA)

## (undated) DEVICE — HEAD HOLDER JUNIOR/ADULT

## (undated) DEVICE — PADDING CAST 4 IN X 4 YDS - SOF-ROLL (12RL/BG 6BG/CT)

## (undated) DEVICE — GLOVE BIOGEL INDICATOR SZ 8.5 SURGICAL PF LTX - (50/BX 4BX/CA)

## (undated) DEVICE — LACTATED RINGERS INJ 1000 ML - (14EA/CA 60CA/PF)

## (undated) DEVICE — SET IRRIGATION CYSTOSCOPY TUBE L80 IN (20EA/CA)

## (undated) DEVICE — CANISTER SUCTION 3000ML MECHANICAL FILTER AUTO SHUTOFF MEDI-VAC NONSTERILE LF DISP  (40EA/CA)

## (undated) DEVICE — PROTECTOR ULNA NERVE - (36PR/CA)

## (undated) DEVICE — WRAP CO-FLEX 4IN X 5YD STERIL - SELF-ADHERENT (18/CA)

## (undated) DEVICE — VAC CANISTER W/GEL 500ML - FITS NEW MACHINES (10EA/CA)

## (undated) DEVICE — DRESSING 3X8 ADAPTIC GAUZE - NON-ADHERING (36/PK 6PK/BX)

## (undated) DEVICE — SCRUB SOLUTION EXIDINE 4% 40Z - 48/CS CHLORAHEXADINE GLUCONATE

## (undated) DEVICE — SWAB ANAEROBIC SPEC.COLLECTOR - (25/PK 4PK/CA 100EA/CA)

## (undated) DEVICE — TRAY SKIN SCRUB PVP WET (20EA/CA) PART #DYND70356 DISCONTINUED

## (undated) DEVICE — TUBING CLEARLINK DUO-VENT - C-FLO (48EA/CA)

## (undated) DEVICE — ELECTRODE DUAL RETURN W/ CORD - (50/PK)

## (undated) DEVICE — NEPTUNE 4 PORT MANIFOLD - (20/PK)

## (undated) DEVICE — MASK ANESTHESIA ADULT  - (100/CA)

## (undated) DEVICE — GOWN WARMING STANDARD FLEX - (30/CA)

## (undated) DEVICE — PAD LAP STERILE 18 X 18 - (5/PK 40PK/CA)

## (undated) DEVICE — GLOVE BIOGEL PI INDICATOR SZ 7.0 SURGICAL PF LF - (50/BX 4BX/CA)

## (undated) DEVICE — SET EXTENSION WITH 2 PORTS (48EA/CA) ***PART #2C8610 IS A SUBSTITUTE*****

## (undated) DEVICE — KIT ANESTHESIA W/CIRCUIT & 3/LT BAG W/FILTER (20EA/CA)

## (undated) DEVICE — PACK LOWER EXTREMITY - (2/CA)

## (undated) DEVICE — CUP DENTURE W/ LID - (200/CA)

## (undated) DEVICE — ELECTRODE 850 FOAM ADHESIVE - HYDROGEL RADIOTRNSPRNT (50/PK)

## (undated) DEVICE — SUCTION INSTRUMENT YANKAUER BULBOUS TIP W/O VENT (50EA/CA)

## (undated) DEVICE — SLEEVE, VASO, THIGH, MED

## (undated) DEVICE — SUTURE 3-0 MONOCRYL SH (36PK/BX)